# Patient Record
Sex: MALE | Race: BLACK OR AFRICAN AMERICAN | Employment: UNEMPLOYED | URBAN - METROPOLITAN AREA
[De-identification: names, ages, dates, MRNs, and addresses within clinical notes are randomized per-mention and may not be internally consistent; named-entity substitution may affect disease eponyms.]

---

## 2017-01-29 ENCOUNTER — APPOINTMENT (OUTPATIENT)
Dept: CT IMAGING | Age: 42
End: 2017-01-29
Attending: EMERGENCY MEDICINE
Payer: MEDICARE

## 2017-01-29 ENCOUNTER — HOSPITAL ENCOUNTER (EMERGENCY)
Age: 42
Discharge: HOME | End: 2017-01-29
Attending: EMERGENCY MEDICINE | Admitting: EMERGENCY MEDICINE
Payer: MEDICARE

## 2017-01-29 VITALS
SYSTOLIC BLOOD PRESSURE: 127 MMHG | TEMPERATURE: 97.8 F | DIASTOLIC BLOOD PRESSURE: 94 MMHG | HEART RATE: 103 BPM | OXYGEN SATURATION: 96 % | RESPIRATION RATE: 16 BRPM

## 2017-01-29 DIAGNOSIS — R10.9 ACUTE ABDOMINAL PAIN: Primary | ICD-10-CM

## 2017-01-29 LAB
ALBUMIN SERPL BCP-MCNC: 3.7 G/DL (ref 3.5–5)
ALBUMIN/GLOB SERPL: 0.8 {RATIO} (ref 1.1–2.2)
ALP SERPL-CCNC: 158 U/L (ref 45–117)
ALT SERPL-CCNC: 10 U/L (ref 12–78)
ANION GAP BLD CALC-SCNC: 13 MMOL/L (ref 5–15)
AST SERPL W P-5'-P-CCNC: 15 U/L (ref 15–37)
BASOPHILS # BLD AUTO: 0.1 K/UL (ref 0–0.1)
BASOPHILS # BLD: 1 % (ref 0–1)
BILIRUB SERPL-MCNC: 1 MG/DL (ref 0.2–1)
BUN SERPL-MCNC: 15 MG/DL (ref 6–20)
BUN/CREAT SERPL: 2 (ref 12–20)
CALCIUM SERPL-MCNC: 8.8 MG/DL (ref 8.5–10.1)
CHLORIDE SERPL-SCNC: 99 MMOL/L (ref 97–108)
CO2 SERPL-SCNC: 27 MMOL/L (ref 21–32)
CREAT SERPL-MCNC: 6.99 MG/DL (ref 0.7–1.3)
DIFFERENTIAL METHOD BLD: ABNORMAL
EOSINOPHIL # BLD: 1.2 K/UL (ref 0–0.4)
EOSINOPHIL NFR BLD: 14 % (ref 0–7)
ERYTHROCYTE [DISTWIDTH] IN BLOOD BY AUTOMATED COUNT: 18.8 % (ref 11.5–14.5)
GLOBULIN SER CALC-MCNC: 4.4 G/DL (ref 2–4)
GLUCOSE SERPL-MCNC: 87 MG/DL (ref 65–100)
HCT VFR BLD AUTO: 39.7 % (ref 36.6–50.3)
HGB BLD-MCNC: 13.4 G/DL (ref 12.1–17)
INR BLD: 1.2 (ref 0.9–1.2)
LIPASE SERPL-CCNC: 159 U/L (ref 73–393)
LYMPHOCYTES # BLD AUTO: 17 % (ref 12–49)
LYMPHOCYTES # BLD: 1.5 K/UL (ref 0.8–3.5)
MCH RBC QN AUTO: 32.1 PG (ref 26–34)
MCHC RBC AUTO-ENTMCNC: 33.8 G/DL (ref 30–36.5)
MCV RBC AUTO: 95.2 FL (ref 80–99)
MONOCYTES # BLD: 0.7 K/UL (ref 0–1)
MONOCYTES NFR BLD AUTO: 8 % (ref 5–13)
NEUTS SEG # BLD: 5.1 K/UL (ref 1.8–8)
NEUTS SEG NFR BLD AUTO: 60 % (ref 32–75)
PLATELET # BLD AUTO: 145 K/UL (ref 150–400)
PLATELET COMMENTS,PCOM: ABNORMAL
POTASSIUM SERPL-SCNC: 3.8 MMOL/L (ref 3.5–5.1)
PROT SERPL-MCNC: 8.1 G/DL (ref 6.4–8.2)
RBC # BLD AUTO: 4.17 M/UL (ref 4.1–5.7)
RBC MORPH BLD: ABNORMAL
SODIUM SERPL-SCNC: 139 MMOL/L (ref 136–145)
WBC # BLD AUTO: 8.6 K/UL (ref 4.1–11.1)

## 2017-01-29 PROCEDURE — 99284 EMERGENCY DEPT VISIT MOD MDM: CPT

## 2017-01-29 PROCEDURE — 74176 CT ABD & PELVIS W/O CONTRAST: CPT

## 2017-01-29 PROCEDURE — 74011250636 HC RX REV CODE- 250/636: Performed by: EMERGENCY MEDICINE

## 2017-01-29 PROCEDURE — 85025 COMPLETE CBC W/AUTO DIFF WBC: CPT | Performed by: EMERGENCY MEDICINE

## 2017-01-29 PROCEDURE — 96372 THER/PROPH/DIAG INJ SC/IM: CPT

## 2017-01-29 PROCEDURE — 85610 PROTHROMBIN TIME: CPT

## 2017-01-29 PROCEDURE — 74011250637 HC RX REV CODE- 250/637: Performed by: EMERGENCY MEDICINE

## 2017-01-29 PROCEDURE — 36415 COLL VENOUS BLD VENIPUNCTURE: CPT | Performed by: EMERGENCY MEDICINE

## 2017-01-29 PROCEDURE — 83690 ASSAY OF LIPASE: CPT | Performed by: EMERGENCY MEDICINE

## 2017-01-29 PROCEDURE — 80053 COMPREHEN METABOLIC PANEL: CPT | Performed by: EMERGENCY MEDICINE

## 2017-01-29 RX ORDER — ONDANSETRON 4 MG/1
8 TABLET, ORALLY DISINTEGRATING ORAL
Status: COMPLETED | OUTPATIENT
Start: 2017-01-29 | End: 2017-01-29

## 2017-01-29 RX ORDER — HYDROMORPHONE HYDROCHLORIDE 1 MG/ML
1 INJECTION, SOLUTION INTRAMUSCULAR; INTRAVENOUS; SUBCUTANEOUS
Status: COMPLETED | OUTPATIENT
Start: 2017-01-29 | End: 2017-01-29

## 2017-01-29 RX ORDER — WARFARIN 2.5 MG/1
2.5 TABLET ORAL
COMMUNITY
End: 2017-03-24

## 2017-01-29 RX ORDER — ONDANSETRON 4 MG/1
4 TABLET, ORALLY DISINTEGRATING ORAL
Qty: 10 TAB | Refills: 0 | Status: SHIPPED | OUTPATIENT
Start: 2017-01-29 | End: 2017-03-24

## 2017-01-29 RX ORDER — HYDROCODONE BITARTRATE AND ACETAMINOPHEN 5; 325 MG/1; MG/1
1 TABLET ORAL
Qty: 20 TAB | Refills: 0 | Status: SHIPPED | OUTPATIENT
Start: 2017-01-29 | End: 2017-03-24

## 2017-01-29 RX ADMIN — HYDROMORPHONE HYDROCHLORIDE 1 MG: 1 INJECTION, SOLUTION INTRAMUSCULAR; INTRAVENOUS; SUBCUTANEOUS at 03:33

## 2017-01-29 RX ADMIN — ONDANSETRON 8 MG: 4 TABLET, ORALLY DISINTEGRATING ORAL at 03:33

## 2017-01-29 NOTE — ED NOTES
Pt to ED for LLQ abd pain and N/V since 1300, denies diarrhea, fevers. Pt reports hx of GI surgery (including appendectomy and cholecystectomy), placed on cardiac monitoring x2, call bell within reach.

## 2017-01-29 NOTE — ED PROVIDER NOTES
HPI Comments: Cozette Bence is a 46 y.o. male, pmhx significant for peritonitis, abdominal hematoma, pancreatitis, SBO, and PUD, who presents ambulatory to the ED c/o sharp intermittent LLQ abdominal pain x 1 day. Pt notes that the pain is gradually worsening since 1pm yesterday and is currently a 7/10 in the ED, Pt additionally complains of nausea and vomiting. He endorses having a bowel movement today. Pt also has a surgical hx of cholecystectomy, appendectomy, bowel resection, and kidney transplants, but is not currently on any immunotherapy. Pt notes that he is an end stage renal patient on Tuesday, Thursday, and Saturday HD. Pt endorses receiving full dialysis tx yesterday to his typical dry weight off 66kg. Of note, pt is on Warfarin. Pt specifically denies diarrhea, constipation, blood in stool, hematemesis, chest pain, fever, chills, or SOB. PCP: Jessica Lima MD    PSHx: Significant for 2 kidney transplants, cholecystectomy, appendectomy, small bowel resection  Social Hx: -tobacco, -EtOH, -Illicit Drugs    There are no other complaints, changes, or physical findings at this time. The history is provided by the patient.         Past Medical History:   Diagnosis Date    Abdominal hematoma     AICD (automatic cardioverter/defibrillator) present     CAD (coronary artery disease)      anterior MI s/p 2 stents  2007    Chronic abdominal pain     Chronic kidney disease     DVT (deep venous thrombosis) ([address] 2001    DVT of popliteal vein ([address] 11/2011     not anticoagulated due to bleeding, IVC filter    Gallstone pancreatitis     Gastrointestinal disorder      acid reflux    Gastrointestinal disorder      peptic ulcer    High cholesterol     Hypertension     Kidney transplant      b/l kidneys 1995, 2001    Nephrotic syndrome     Other ill-defined conditions(799.89)      kidny transplant x2,  on dialysis    Other ill-defined conditions(799.89)      dialysis tu-th-sat    Other ill-defined conditions(799.89)      hx recurrent left leg DVT    Peritonitis (HCC)     Seizures (HCC)     Small bowel obstruction (HCC)     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    [facility])        Past Surgical History:   Procedure Laterality Date    Pr edg us exam surgical alter stom duodenum/jejunum  7/15/2011          Pr esophagogastroduodenoscopy transoral diagnostic  5/24/2012          Hx pacemaker  6/2009     AICD    Hx urological       2 kidney transplants    Hx cholecystectomy      Hx appendectomy      Hx small bowel resection      Hx other surgical       cholecystectomy    Hx other surgical  11/2011     exploratory laparotomy    Hx other surgical       fem-pop    Hx other surgical  02/2013     right upper extremity fistula    Hx transplant  2001      Kidney         Family History:   Problem Relation Age of Onset    COPD Mother     Lung Disease Mother     Cancer Father      stomach    Cancer Maternal Grandmother        Social History     Social History    Marital status: SINGLE     Spouse name: N/A    Number of children: N/A    Years of education: N/A     Occupational History    Not on file. Social History Main Topics    Smoking status: Never Smoker    Smokeless tobacco: Never Used    Alcohol use No    Drug use: No    Sexual activity: Not on file     Other Topics Concern    Not on file     Social History Narrative         ALLERGIES: Shellfish containing products; Heparin analogues; and Iodinated contrast media - oral and iv dye    Review of Systems   Constitutional: Negative. Negative for chills and fever. HENT: Negative. Eyes: Negative. Respiratory: Negative. Negative for shortness of breath. Cardiovascular: Negative. Negative for chest pain. Gastrointestinal: Positive for abdominal pain, nausea and vomiting. Negative for blood in stool, constipation and diarrhea. Genitourinary: Negative. Negative for hematuria. Skin: Negative. Neurological: Negative. Hematological: Negative. Psychiatric/Behavioral: Negative. All other systems reviewed and are negative. Patient Vitals for the past 12 hrs:   Temp Pulse Resp BP SpO2   01/29/17 0424 - - - (!) 127/94 96 %   01/29/17 0315 - - - (!) 123/97 96 %   01/29/17 0300 - - - (!) 120/99 96 %   01/29/17 0200 - - - (!) 132/95 96 %   01/29/17 0142 97.8 °F (36.6 °C) (!) 103 16 (!) 131/100 98 %       Physical Exam   Constitutional: He is oriented to person, place, and time. He appears well-developed and well-nourished. No distress. HENT:   Head: Normocephalic and atraumatic. Eyes: Conjunctivae and EOM are normal. Pupils are equal, round, and reactive to light. Right eye exhibits no discharge. Left eye exhibits no discharge. No scleral icterus. Neck: Normal range of motion. Neck supple. No JVD present. Cardiovascular: Normal rate, regular rhythm, normal heart sounds and intact distal pulses. Exam reveals no gallop and no friction rub. No murmur heard. Pulmonary/Chest: Effort normal and breath sounds normal. No stridor. No respiratory distress. He has no wheezes. He has no rales. He exhibits no tenderness. Abdominal: Soft. He exhibits no distension, no abdominal bruit, no pulsatile midline mass and no mass. Bowel sounds are decreased. There is no hepatosplenomegaly. There is generalized tenderness. There is no rebound, no guarding and no CVA tenderness. No hernia. Musculoskeletal: Normal range of motion. Neurological: He is alert and oriented to person, place, and time. Skin: Skin is warm and dry. No rash noted. He is not diaphoretic. No erythema. No pallor. Nursing note and vitals reviewed. MDM  Number of Diagnoses or Management Options  Acute abdominal pain:   Diagnosis management comments: DDx: small bowel obstruction, perforated viscous, diverticulitis, hernia, mesenteric ischemia     Complex vascular and gastrointestinal surgical history.  Presents with 24 hrs of abdominal pain, nausea, and vomiting. Will obtain labs, and CT and final disposition will be made pending those results. CT was unremarkable. Repeat exam shows no surgical signs. Will follow up with Dr Kun Ruvalcaba and/or Complexity of Data Reviewed  Clinical lab tests: reviewed and ordered  Tests in the radiology section of CPT®: ordered and reviewed  Review and summarize past medical records: yes  Independent visualization of images, tracings, or specimens: yes    Risk of Complications, Morbidity, and/or Mortality  Presenting problems: moderate  Diagnostic procedures: moderate  Management options: moderate    Patient Progress  Patient progress: stable    ED Course       Procedures     PROGRESS NOTE:  4:32 AM  Pt is feeling better. No surgical signs. Tolerating PO. Pt is ready for discharge  Written by Sandie Willingham ED Scribe, as dictated by Lily Luis MD.      LABORATORY TESTS:  Recent Results (from the past 12 hour(s))   CBC WITH AUTOMATED DIFF    Collection Time: 01/29/17  3:38 AM   Result Value Ref Range    WBC 8.6 4.1 - 11.1 K/uL    RBC 4.17 4.10 - 5.70 M/uL    HGB 13.4 12.1 - 17.0 g/dL    HCT 39.7 36.6 - 50.3 %    MCV 95.2 80.0 - 99.0 FL    MCH 32.1 26.0 - 34.0 PG    MCHC 33.8 30.0 - 36.5 g/dL    RDW 18.8 (H) 11.5 - 14.5 %    PLATELET 986 (L) 173 - 400 K/uL    NEUTROPHILS 60 32 - 75 %    LYMPHOCYTES 17 12 - 49 %    MONOCYTES 8 5 - 13 %    EOSINOPHILS 14 (H) 0 - 7 %    BASOPHILS 1 0 - 1 %    ABS. NEUTROPHILS 5.1 1.8 - 8.0 K/UL    ABS. LYMPHOCYTES 1.5 0.8 - 3.5 K/UL    ABS. MONOCYTES 0.7 0.0 - 1.0 K/UL    ABS. EOSINOPHILS 1.2 (H) 0.0 - 0.4 K/UL    ABS.  BASOPHILS 0.1 0.0 - 0.1 K/UL    DF SMEAR SCANNED      PLATELET COMMENTS LARGE PLATELETS      RBC COMMENTS ANISOCYTOSIS  1+       METABOLIC PANEL, COMPREHENSIVE    Collection Time: 01/29/17  3:38 AM   Result Value Ref Range    Sodium 139 136 - 145 mmol/L    Potassium 3.8 3.5 - 5.1 mmol/L    Chloride 99 97 - 108 mmol/L    CO2 27 21 - 32 mmol/L    Anion gap 13 5 - 15 mmol/L    Glucose 87 65 - 100 mg/dL    BUN 15 6 - 20 MG/DL    Creatinine 6.99 (H) 0.70 - 1.30 MG/DL    BUN/Creatinine ratio 2 (L) 12 - 20      GFR est AA 11 (L) >60 ml/min/1.73m2    GFR est non-AA 9 (L) >60 ml/min/1.73m2    Calcium 8.8 8.5 - 10.1 MG/DL    Bilirubin, total 1.0 0.2 - 1.0 MG/DL    ALT 10 (L) 12 - 78 U/L    AST 15 15 - 37 U/L    Alk. phosphatase 158 (H) 45 - 117 U/L    Protein, total 8.1 6.4 - 8.2 g/dL    Albumin 3.7 3.5 - 5.0 g/dL    Globulin 4.4 (H) 2.0 - 4.0 g/dL    A-G Ratio 0.8 (L) 1.1 - 2.2     LIPASE    Collection Time: 01/29/17  3:38 AM   Result Value Ref Range    Lipase 159 73 - 393 U/L   POC INR    Collection Time: 01/29/17  4:40 AM   Result Value Ref Range    INR (POC) 1.2 (H) <1.2         IMAGING RESULTS:  CT ABD PELV WO CONT   Final Result   INDICATION: Left lower quadrant pain since yesterday with vomiting     COMPARISON: 9/9/2015     TECHNIQUE:   Thin axial images were obtained through the abdomen and pelvis. Coronal and  sagittal reconstructions were generated. Oral contrast was not administered. CT  dose reduction was achieved through use of a standardized protocol tailored for  this examination and automatic exposure control for dose modulation.      The absence of intravenous contrast material reduces the sensitivity for  evaluation of the solid parenchymal organs of the abdomen.      FINDINGS:   LUNG BASES: Scarring is stable at the left base. INCIDENTALLY IMAGED HEART AND MEDIASTINUM: There is a trace pericardial effusion  LIVER: There is a 3.7 x 1.3 cm partially calcified serosal cystic abnormality in  the posterior segment of the right hepatic lobe, increased compared to the prior  examination. GALLBLADDER: Surgically absent  SPLEEN: No mass. PANCREAS: No mass or ductal dilatation. ADRENALS: Unremarkable. KIDNEYS/URETERS: The kidneys are atrophic bilaterally.  There is a 2.5 cm left  renal cyst. Calcified bilateral lower lobe renal transplants are again noted. STOMACH: Unremarkable. SMALL BOWEL: No dilatation or wall thickening. COLON: No dilatation or wall thickening. APPENDIX: Surgically absent. PERITONEUM: Calcified mesenteric mass is unchanged compared to the prior  examination. There is a bilobed 6.7 x 3.7 cm cystic abnormality in the  gastrohepatic region, new compared to the prior exam.   RETROPERITONEUM: No lymphadenopathy or aortic aneurysm. REPRODUCTIVE ORGANS: No pelvic mass or lymphadenopathy  URINARY BLADDER: No mass or calculus. BONES: Diffuse osseous sclerosis is again demonstrated. ADDITIONAL COMMENTS: N/A     IMPRESSION  IMPRESSION:  No acute abnormality. New bilobed cystic abnormality in the gastrohepatic region  may represent the sequelae of peritonitis as may a small serosal cystic  abnormality along the right hepatic lobe. Mesenteric mass is stable. MEDICATIONS GIVEN:  Medications   HYDROmorphone (PF) (DILAUDID) injection 1 mg (1 mg IntraMUSCular Given 1/29/17 0333)   ondansetron (ZOFRAN ODT) tablet 8 mg (8 mg Oral Given 1/29/17 0333)       IMPRESSION:  1. Acute abdominal pain        PLAN:  1. Discharge home. Current Discharge Medication List      START taking these medications    Details   HYDROcodone-acetaminophen (NORCO) 5-325 mg per tablet Take 1 Tab by mouth every four (4) hours as needed for Pain. Max Daily Amount: 6 Tabs. Qty: 20 Tab, Refills: 0      ondansetron (ZOFRAN ODT) 4 mg disintegrating tablet Take 1 Tab by mouth every eight (8) hours as needed for Nausea. Qty: 10 Tab, Refills: 0         CONTINUE these medications which have NOT CHANGED    Details   !! warfarin (COUMADIN) 2.5 mg tablet Take 2.5 mg by mouth every Sunday. promethazine (PHENERGAN) 25 mg tablet Take 1 Tab by mouth every six (6) hours as needed for Nausea. Qty: 15 Tab, Refills: 0      levETIRAcetam (KEPPRA) 750 mg tablet Take 750 mg by mouth two (2) times a day.       lacosamide (VIMPAT) 50 mg tab tablet Take 50 mg by mouth two (2) times a day.      atorvastatin (LIPITOR) 20 mg tablet Take 20 mg by mouth daily. !! warfarin (COUMADIN) 5 mg tablet Decrease coumadin dose to 2.5mg while taking these antibiotics. Recheck INR every 2 days until you finish the antibiotics. Qty: 30 Tab, Refills: 0      pantoprazole (PROTONIX) 40 mg tablet Take 40 mg by mouth daily. clopidogrel (PLAVIX) 75 mg tablet Take  by mouth daily. sevelamer carbonate (RENVELA) 800 mg Tab tab Take 2,400 mg by mouth three (3) times daily (with meals). aspirin 81 mg chewable tablet Take 81 mg by mouth daily. metoprolol (LOPRESSOR) 25 mg tablet Take 12.5 mg by mouth daily. !! - Potential duplicate medications found. Please discuss with provider. 2.   Follow-up Information     Follow up With Details Comments Contact Info    Damir Varma MD Call in 1 day      Yesenia Neff MD In 1 week  [facility] [address] [place] [address]  [phone]      [facility] EMERGENCY DEPT  If symptoms worsen [address] [place] [address] Drive  [address] [facility]  [phone]      3. Return to ED if worse       DISCHARGE NOTE:  4:55 AM  The patient's results have been reviewed with family and/or caregiver. They verbally convey their understanding and agreement of the patient's signs, symptoms, diagnosis, treatment, and prognosis. They additionally agree to follow up as recommended in the discharge instructions or to return to the Emergency Room should the patient's condition change prior to their follow-up appointment. The family and/or caregiver verbally agrees with the care-plan and all of their questions have been answered. The discharge instructions have also been provided to the them along with educational information regarding the patient's diagnosis and a list of reasons why the patient would want to return to the ER prior to their follow-up appointment should their condition change.     Written by KAYLEY Ramirez, as dictated by Jia Ley MD. Edvin Rajput: This note is prepared by Shant Gross, acting as Scribe for Bakari Campos MD.    Bakari Campos MD: The scribe's documentation has been prepared under my direction and personally reviewed by me in its entirety. I confirm that the note above accurately reflects all work, treatment, procedures, and medical decision making performed by me.

## 2017-01-29 NOTE — DISCHARGE INSTRUCTIONS
Abdominal Pain: Care Instructions  Your Care Instructions    Abdominal pain has many possible causes. Some aren't serious and get better on their own in a few days. Others need more testing and treatment. If your pain continues or gets worse, you need to be rechecked and may need more tests to find out what is wrong. You may need surgery to correct the problem. Don't ignore new symptoms, such as fever, nausea and vomiting, urination problems, pain that gets worse, and dizziness. These may be signs of a more serious problem. Your doctor may have recommended a follow-up visit in the next 8 to 12 hours. If you are not getting better, you may need more tests or treatment. The doctor has checked you carefully, but problems can develop later. If you notice any problems or new symptoms, get medical treatment right away. Follow-up care is a key part of your treatment and safety. Be sure to make and go to all appointments, and call your doctor if you are having problems. It's also a good idea to know your test results and keep a list of the medicines you take. How can you care for yourself at home? · Rest until you feel better. · To prevent dehydration, drink plenty of fluids, enough so that your urine is light yellow or clear like water. Choose water and other caffeine-free clear liquids until you feel better. If you have kidney, heart, or liver disease and have to limit fluids, talk with your doctor before you increase the amount of fluids you drink. · If your stomach is upset, eat mild foods, such as rice, dry toast or crackers, bananas, and applesauce. Try eating several small meals instead of two or three large ones. · Wait until 48 hours after all symptoms have gone away before you have spicy foods, alcohol, and drinks that contain caffeine. · Do not eat foods that are high in fat. · Avoid anti-inflammatory medicines such as aspirin, ibuprofen (Advil, Motrin), and naproxen (Aleve).  These can cause stomach upset. Talk to your doctor if you take daily aspirin for another health problem. When should you call for help? Call 911 anytime you think you may need emergency care. For example, call if:  · You passed out (lost consciousness). · You pass maroon or very bloody stools. · You vomit blood or what looks like coffee grounds. · You have new, severe belly pain. Call your doctor now or seek immediate medical care if:  · Your pain gets worse, especially if it becomes focused in one area of your belly. · You have a new or higher fever. · Your stools are black and look like tar, or they have streaks of blood. · You have unexpected vaginal bleeding. · You have symptoms of a urinary tract infection. These may include:  ¨ Pain when you urinate. ¨ Urinating more often than usual.  ¨ Blood in your urine. · You are dizzy or lightheaded, or you feel like you may faint. Watch closely for changes in your health, and be sure to contact your doctor if:  · You are not getting better after 1 day (24 hours). Where can you learn more? Go to http://anabel-mor.info/. Enter M169 in the search box to learn more about \"Abdominal Pain: Care Instructions. \"  Current as of: May 27, 2016  Content Version: 11.1  © 5554-3619 [employer]. Care instructions adapted under license by [employer] (which disclaims liability or warranty for this information). If you have questions about a medical condition or this instruction, always ask your healthcare professional. [place] [address] any warranty or liability for your use of this information. Chartboost Activation    Thank you for requesting access to Chartboost. Please follow the instructions below to securely access and download your online medical record. Chartboost allows you to send messages to your doctor, view your test results, renew your prescriptions, schedule appointments, and more. How Do I Sign Up? 1.  In your internet browser, go to [email or web address]  2. Click on the First Time User? Click Here link in the Sign In box. You will be redirect to the New Member Sign Up page. 3. Enter your Path101 Access Code exactly as it appears below. You will not need to use this code after youve completed the sign-up process. If you do not sign up before the expiration date, you must request a new code. MyChart Access Code: Activation code not generated  Current Path101 Status: Active (This is the date your A-TEXt access code will )    4. Enter the last four digits of your Social Security Number (xxxx) and Date of Birth (mm/dd/yyyy) as indicated and click Submit. You will be taken to the next sign-up page. 5. Create a A-TEXt ID. This will be your Path101 login ID and cannot be changed, so think of one that is secure and easy to remember. 6. Create a Path101 password. You can change your password at any time. 7. Enter your Password Reset Question and Answer. This can be used at a later time if you forget your password. 8. Enter your e-mail address. You will receive e-mail notification when new information is available in [address] 9. Click Sign Up. You can now view and download portions of your medical record. 10. Click the Download Summary menu link to download a portable copy of your medical information. Additional Information    If you have questions, please visit the Frequently Asked Questions section of the Path101 website at [email or web address] Remember, Path101 is NOT to be used for urgent needs. For medical emergencies, dial 911.

## 2017-02-25 ENCOUNTER — APPOINTMENT (OUTPATIENT)
Dept: CT IMAGING | Age: 42
End: 2017-02-25
Attending: EMERGENCY MEDICINE
Payer: MEDICARE

## 2017-02-25 ENCOUNTER — HOSPITAL ENCOUNTER (EMERGENCY)
Age: 42
Discharge: HOME | End: 2017-02-25
Attending: EMERGENCY MEDICINE | Admitting: EMERGENCY MEDICINE
Payer: MEDICARE

## 2017-02-25 VITALS
HEIGHT: 67 IN | BODY MASS INDEX: 22.49 KG/M2 | TEMPERATURE: 98.2 F | RESPIRATION RATE: 16 BRPM | WEIGHT: 143.3 LBS | DIASTOLIC BLOOD PRESSURE: 100 MMHG | HEART RATE: 88 BPM | SYSTOLIC BLOOD PRESSURE: 129 MMHG | OXYGEN SATURATION: 100 %

## 2017-02-25 DIAGNOSIS — R11.2 NON-INTRACTABLE VOMITING WITH NAUSEA, UNSPECIFIED VOMITING TYPE: Primary | ICD-10-CM

## 2017-02-25 DIAGNOSIS — K59.00 CONSTIPATION, UNSPECIFIED CONSTIPATION TYPE: ICD-10-CM

## 2017-02-25 LAB
ALBUMIN SERPL BCP-MCNC: 3.5 G/DL (ref 3.5–5)
ALBUMIN/GLOB SERPL: 0.8 {RATIO} (ref 1.1–2.2)
ALP SERPL-CCNC: 133 U/L (ref 45–117)
ALT SERPL-CCNC: 10 U/L (ref 12–78)
ANION GAP BLD CALC-SCNC: 14 MMOL/L (ref 5–15)
AST SERPL W P-5'-P-CCNC: 18 U/L (ref 15–37)
BASOPHILS # BLD AUTO: 0.1 K/UL (ref 0–0.1)
BASOPHILS # BLD: 2 % (ref 0–1)
BILIRUB SERPL-MCNC: 0.7 MG/DL (ref 0.2–1)
BUN SERPL-MCNC: 20 MG/DL (ref 6–20)
BUN/CREAT SERPL: 3 (ref 12–20)
CALCIUM SERPL-MCNC: 8.6 MG/DL (ref 8.5–10.1)
CHLORIDE SERPL-SCNC: 100 MMOL/L (ref 97–108)
CO2 SERPL-SCNC: 24 MMOL/L (ref 21–32)
CREAT SERPL-MCNC: 7.34 MG/DL (ref 0.7–1.3)
EOSINOPHIL # BLD: 1 K/UL (ref 0–0.4)
EOSINOPHIL NFR BLD: 14 % (ref 0–7)
ERYTHROCYTE [DISTWIDTH] IN BLOOD BY AUTOMATED COUNT: 16.3 % (ref 11.5–14.5)
GLOBULIN SER CALC-MCNC: 4.2 G/DL (ref 2–4)
GLUCOSE SERPL-MCNC: 74 MG/DL (ref 65–100)
HCT VFR BLD AUTO: 35.8 % (ref 36.6–50.3)
HGB BLD-MCNC: 12.2 G/DL (ref 12.1–17)
LACTATE SERPL-SCNC: 1 MMOL/L (ref 0.4–2)
LIPASE SERPL-CCNC: 102 U/L (ref 73–393)
LYMPHOCYTES # BLD AUTO: 15 % (ref 12–49)
LYMPHOCYTES # BLD: 1.1 K/UL (ref 0.8–3.5)
MAGNESIUM SERPL-MCNC: 2.1 MG/DL (ref 1.6–2.4)
MCH RBC QN AUTO: 31.5 PG (ref 26–34)
MCHC RBC AUTO-ENTMCNC: 34.1 G/DL (ref 30–36.5)
MCV RBC AUTO: 92.5 FL (ref 80–99)
MONOCYTES # BLD: 0.5 K/UL (ref 0–1)
MONOCYTES NFR BLD AUTO: 7 % (ref 5–13)
NEUTS SEG # BLD: 4.4 K/UL (ref 1.8–8)
NEUTS SEG NFR BLD AUTO: 62 % (ref 32–75)
PHOSPHATE SERPL-MCNC: 3.3 MG/DL (ref 2.6–4.7)
PLATELET # BLD AUTO: 106 K/UL (ref 150–400)
POTASSIUM SERPL-SCNC: 3.1 MMOL/L (ref 3.5–5.1)
PROT SERPL-MCNC: 7.7 G/DL (ref 6.4–8.2)
RBC # BLD AUTO: 3.87 M/UL (ref 4.1–5.7)
RBC MORPH BLD: ABNORMAL
SODIUM SERPL-SCNC: 138 MMOL/L (ref 136–145)
WBC # BLD AUTO: 7.1 K/UL (ref 4.1–11.1)

## 2017-02-25 PROCEDURE — 83690 ASSAY OF LIPASE: CPT | Performed by: EMERGENCY MEDICINE

## 2017-02-25 PROCEDURE — 96374 THER/PROPH/DIAG INJ IV PUSH: CPT

## 2017-02-25 PROCEDURE — 74011636320 HC RX REV CODE- 636/320: Performed by: EMERGENCY MEDICINE

## 2017-02-25 PROCEDURE — 84100 ASSAY OF PHOSPHORUS: CPT | Performed by: EMERGENCY MEDICINE

## 2017-02-25 PROCEDURE — 99283 EMERGENCY DEPT VISIT LOW MDM: CPT

## 2017-02-25 PROCEDURE — 74176 CT ABD & PELVIS W/O CONTRAST: CPT

## 2017-02-25 PROCEDURE — 80053 COMPREHEN METABOLIC PANEL: CPT | Performed by: EMERGENCY MEDICINE

## 2017-02-25 PROCEDURE — 83735 ASSAY OF MAGNESIUM: CPT | Performed by: EMERGENCY MEDICINE

## 2017-02-25 PROCEDURE — 83605 ASSAY OF LACTIC ACID: CPT | Performed by: EMERGENCY MEDICINE

## 2017-02-25 PROCEDURE — 74011250636 HC RX REV CODE- 250/636: Performed by: EMERGENCY MEDICINE

## 2017-02-25 PROCEDURE — 36415 COLL VENOUS BLD VENIPUNCTURE: CPT | Performed by: EMERGENCY MEDICINE

## 2017-02-25 PROCEDURE — 85025 COMPLETE CBC W/AUTO DIFF WBC: CPT | Performed by: EMERGENCY MEDICINE

## 2017-02-25 RX ORDER — AMOXICILLIN 250 MG
2 CAPSULE ORAL DAILY
Qty: 40 TAB | Refills: 0 | Status: SHIPPED | OUTPATIENT
Start: 2017-02-25 | End: 2017-03-17

## 2017-02-25 RX ORDER — MORPHINE SULFATE 2 MG/ML
4 INJECTION, SOLUTION INTRAMUSCULAR; INTRAVENOUS
Status: DISCONTINUED | OUTPATIENT
Start: 2017-02-25 | End: 2017-02-25 | Stop reason: HOSPADM

## 2017-02-25 RX ORDER — ONDANSETRON 2 MG/ML
4 INJECTION INTRAMUSCULAR; INTRAVENOUS
Status: COMPLETED | OUTPATIENT
Start: 2017-02-25 | End: 2017-02-25

## 2017-02-25 RX ORDER — ONDANSETRON 4 MG/1
4 TABLET, ORALLY DISINTEGRATING ORAL
Qty: 10 TAB | Refills: 0 | Status: SHIPPED | OUTPATIENT
Start: 2017-02-25 | End: 2017-03-24

## 2017-02-25 RX ADMIN — DIATRIZOATE MEGLUMINE AND DIATRIZOATE SODIUM 30 ML: 600; 100 SOLUTION ORAL; RECTAL at 10:21

## 2017-02-25 RX ADMIN — ONDANSETRON HYDROCHLORIDE 4 MG: 2 INJECTION, SOLUTION INTRAMUSCULAR; INTRAVENOUS at 10:21

## 2017-02-25 NOTE — DISCHARGE INSTRUCTIONS
You were seen in the emergency department for nausea and vomiting. Your exam thankfully shows no blockage or infection but you do have some constipation. Please use the prescribed medications to soften stool and help with nausea. Return for fever, new vomiting or other new concerning symptoms. Nausea and Vomiting: Care Instructions  Your Care Instructions    When you are nauseated, you may feel weak and sweaty and notice a lot of saliva in your mouth. Nausea often leads to vomiting. Most of the time you do not need to worry about nausea and vomiting, but they can be signs of other illnesses. Two common causes of nausea and vomiting are stomach flu and food poisoning. Nausea and vomiting from viral stomach flu will usually start to improve within 24 hours. Nausea and vomiting from food poisoning may last from 12 to 48 hours. The doctor has checked you carefully, but problems can develop later. If you notice any problems or new symptoms, get medical treatment right away. Follow-up care is a key part of your treatment and safety. Be sure to make and go to all appointments, and call your doctor if you are having problems. It's also a good idea to know your test results and keep a list of the medicines you take. How can you care for yourself at home? · To prevent dehydration, drink plenty of fluids, enough so that your urine is light yellow or clear like water. Choose water and other caffeine-free clear liquids until you feel better. If you have kidney, heart, or liver disease and have to limit fluids, talk with your doctor before you increase the amount of fluids you drink. · Rest in bed until you feel better. · When you are able to eat, try clear soups, mild foods, and liquids until all symptoms are gone for 12 to 48 hours. Other good choices include dry toast, crackers, cooked cereal, and gelatin dessert, such as Jell-O. When should you call for help?   Call 911 anytime you think you may need emergency care. For example, call if:  · You passed out (lost consciousness). Call your doctor now or seek immediate medical care if:  · You have symptoms of dehydration, such as:  ¨ Dry eyes and a dry mouth. ¨ Passing only a little dark urine. ¨ Feeling thirstier than usual.  · You have new or worsening belly pain. · You have a new or higher fever. · You vomit blood or what looks like coffee grounds. Watch closely for changes in your health, and be sure to contact your doctor if:  · You have ongoing nausea and vomiting. · Your vomiting is getting worse. · Your vomiting lasts longer than 2 days. · You are not getting better as expected. Where can you learn more? Go to http://anabel-mor.info/. Enter 25 [id number] in the search box to learn more about \"Nausea and Vomiting: Care Instructions. \"  Current as of: May 27, 2016  Content Version: 11.1  © 1436-6114 [employer]. Care instructions adapted under license by [employer] (which disclaims liability or warranty for this information). If you have questions about a medical condition or this instruction, always ask your healthcare professional. [place] [address] any warranty or liability for your use of this information. Constipation: Care Instructions  Your Care Instructions  Constipation means that you have a hard time passing stools (bowel movements). People pass stools from 3 times a day to once every 3 days. What is normal for you may be different. Constipation may occur with pain in the rectum and cramping. The pain may get worse when you try to pass stools. Sometimes there are small amounts of bright red blood on toilet paper or the surface of stools. This is because of enlarged veins near the rectum (hemorrhoids). A few changes in your diet and lifestyle may help you avoid ongoing constipation. Your doctor may also prescribe medicine to help loosen your stool. Some medicines can cause constipation. These include pain medicines and antidepressants. Tell your doctor about all the medicines you take. Your doctor may want to make a medicine change to ease your symptoms. Follow-up care is a key part of your treatment and safety. Be sure to make and go to all appointments, and call your doctor if you are having problems. It's also a good idea to know your test results and keep a list of the medicines you take. How can you care for yourself at home? · Drink plenty of fluids, enough so that your urine is light yellow or clear like water. If you have kidney, heart, or liver disease and have to limit fluids, talk with your doctor before you increase the amount of fluids you drink. · Include high-fiber foods in your diet each day. These include fruits, vegetables, beans, and whole grains. · Get at least 30 minutes of exercise on most days of the week. Walking is a good choice. You also may want to do other activities, such as running, swimming, cycling, or playing tennis or team sports. · Take a fiber supplement, such as Citrucel or Metamucil, every day. Read and follow all instructions on the label. · Schedule time each day for a bowel movement. A daily routine may help. Take your time having your bowel movement. · Support your feet with a small step stool when you sit on the toilet. This helps flex your hips and places your pelvis in a squatting position. · Your doctor may recommend an over-the-counter laxative to relieve your constipation. Examples are Milk of Magnesia and MiraLax. Read and follow all instructions on the label. Do not use laxatives on a long-term basis. When should you call for help? Call your doctor now or seek immediate medical care if:  · You have new or worse belly pain. · You have new or worse nausea or vomiting. · You have blood in your stools. Watch closely for changes in your health, and be sure to contact your doctor if:  · Your constipation is getting worse.   · You do not get better as expected. Where can you learn more? Go to http://anabel-mor.info/. Enter 21  in the search box to learn more about \"Constipation: Care Instructions. \"  Current as of: May 27, 2016  Content Version: 11.1  © 6600-1575 [employer]. Care instructions adapted under license by [employer] (which disclaims liability or warranty for this information). If you have questions about a medical condition or this instruction, always ask your healthcare professional. [place] [address] any warranty or liability for your use of this information.

## 2017-03-24 ENCOUNTER — HOSPITAL ENCOUNTER (EMERGENCY)
Age: 42
Discharge: HOME | End: 2017-03-24
Attending: EMERGENCY MEDICINE | Admitting: EMERGENCY MEDICINE
Payer: MEDICARE

## 2017-03-24 VITALS
WEIGHT: 142.2 LBS | BODY MASS INDEX: 22.32 KG/M2 | OXYGEN SATURATION: 100 % | HEART RATE: 78 BPM | HEIGHT: 67 IN | SYSTOLIC BLOOD PRESSURE: 111 MMHG | RESPIRATION RATE: 18 BRPM | TEMPERATURE: 97.2 F | DIASTOLIC BLOOD PRESSURE: 79 MMHG

## 2017-03-24 DIAGNOSIS — R11.0 NAUSEA WITHOUT VOMITING: Primary | ICD-10-CM

## 2017-03-24 PROCEDURE — 74011250637 HC RX REV CODE- 250/637: Performed by: EMERGENCY MEDICINE

## 2017-03-24 PROCEDURE — 99283 EMERGENCY DEPT VISIT LOW MDM: CPT

## 2017-03-24 RX ORDER — ONDANSETRON 4 MG/1
8 TABLET, ORALLY DISINTEGRATING ORAL
Status: COMPLETED | OUTPATIENT
Start: 2017-03-24 | End: 2017-03-24

## 2017-03-24 RX ORDER — OXYCODONE AND ACETAMINOPHEN 5; 325 MG/1; MG/1
2 TABLET ORAL ONCE
Status: COMPLETED | OUTPATIENT
Start: 2017-03-24 | End: 2017-03-24

## 2017-03-24 RX ADMIN — ONDANSETRON 8 MG: 4 TABLET, ORALLY DISINTEGRATING ORAL at 05:02

## 2017-03-24 RX ADMIN — OXYCODONE HYDROCHLORIDE AND ACETAMINOPHEN 2 TABLET: 5; 325 TABLET ORAL at 05:35

## 2017-03-24 NOTE — DISCHARGE INSTRUCTIONS
Nausea and Vomiting: Care Instructions  Your Care Instructions    When you are nauseated, you may feel weak and sweaty and notice a lot of saliva in your mouth. Nausea often leads to vomiting. Most of the time you do not need to worry about nausea and vomiting, but they can be signs of other illnesses. Two common causes of nausea and vomiting are stomach flu and food poisoning. Nausea and vomiting from viral stomach flu will usually start to improve within 24 hours. Nausea and vomiting from food poisoning may last from 12 to 48 hours. The doctor has checked you carefully, but problems can develop later. If you notice any problems or new symptoms, get medical treatment right away. Follow-up care is a key part of your treatment and safety. Be sure to make and go to all appointments, and call your doctor if you are having problems. It's also a good idea to know your test results and keep a list of the medicines you take. How can you care for yourself at home? · To prevent dehydration, drink plenty of fluids, enough so that your urine is light yellow or clear like water. Choose water and other caffeine-free clear liquids until you feel better. If you have kidney, heart, or liver disease and have to limit fluids, talk with your doctor before you increase the amount of fluids you drink. · Rest in bed until you feel better. · When you are able to eat, try clear soups, mild foods, and liquids until all symptoms are gone for 12 to 48 hours. Other good choices include dry toast, crackers, cooked cereal, and gelatin dessert, such as Jell-O. When should you call for help? Call 911 anytime you think you may need emergency care. For example, call if:  · You passed out (lost consciousness). Call your doctor now or seek immediate medical care if:  · You have symptoms of dehydration, such as:  ¨ Dry eyes and a dry mouth. ¨ Passing only a little dark urine.   ¨ Feeling thirstier than usual.  · You have new or worsening belly pain. · You have a new or higher fever. · You vomit blood or what looks like coffee grounds. Watch closely for changes in your health, and be sure to contact your doctor if:  · You have ongoing nausea and vomiting. · Your vomiting is getting worse. · Your vomiting lasts longer than 2 days. · You are not getting better as expected. Where can you learn more? Go to http://anabel-mor.info/. Enter 25 [id number] in the search box to learn more about \"Nausea and Vomiting: Care Instructions. \"  Current as of: May 27, 2016  Content Version: 11.1  © 8928-6249 [employer]. Care instructions adapted under license by [employer] (which disclaims liability or warranty for this information). If you have questions about a medical condition or this instruction, always ask your healthcare professional. [address] any warranty or liability for your use of this information. Oral Rehydration: Care Instructions  Your Care Instructions  Dehydration occurs when your body loses too much water. This can happen if you do not drink enough fluids or lose a lot of fluid due to diarrhea, vomiting, or sweating. Being dehydrated can cause health problems and can even be life-threatening. To replace lost fluids, you need to drink liquid that contains special chemicals called electrolytes. Electrolytes keep your body working well. Plain water does not have electrolytes. You also need to rest to prevent more fluid loss. Replacing water and electrolytes (oral rehydration) completely takes about 36 hours. But you should feel better within a few hours. Follow-up care is a key part of your treatment and safety. Be sure to make and go to all appointments, and call your doctor if you are having problems. It's also a good idea to know your test results and keep a list of the medicines you take. How can you care for yourself at home?   · Take frequent sips of a drink such as Gatorade, Powerade, or other rehydration drinks that your doctor suggests. These replace both fluid and important chemicals (electrolytes) you need for balance in your blood. · Drink 2 quarts of cool liquid over 2 to 4 hours. You should have at least 10 glasses of liquid a day to replace lost fluid. If you have kidney, heart, or liver disease and have to limit fluids, talk with your doctor before you increase the amount of fluids you drink. · Make your own drink. Measure everything carefully. The drink may not work well or may even be harmful if the amounts are off. ¨ 1 quart water  ¨ ½ teaspoon salt  ¨ 6 teaspoons sugar  · Do not drink liquid with caffeine, such as coffee and jolie. · Do not drink any alcohol. It can make you dehydrated. · Drink plenty of fluids, enough so that your urine is light yellow or clear like water. If you have kidney, heart, or liver disease and have to limit fluids, talk with your doctor before you increase the amount of fluids you drink. When should you call for help? Call 911 anytime you think you may need emergency care. For example, call if:  · You have signs of severe dehydration, such as:  ¨ You are confused or unable to stay awake. ¨ You passed out (lost consciousness). Call your doctor now or seek immediate medical care if:  · You still have signs of dehydration. You have sunken eyes and a dry mouth, and you pass only a little dark urine. · You are dizzy or lightheaded, or you feel like you may faint. · You are not able to keep down fluids. Watch closely for changes in your health, and be sure to contact your doctor if:  · You do not get better as expected. Where can you learn more? Go to http://anabel-mor.info/. Enter I040 in the search box to learn more about \"Oral Rehydration: Care Instructions. \"  Current as of: May 27, 2016  Content Version: 11.1  © 5761-6002 [employer].  Care instructions adapted under license by [employer] (which disclaims liability or warranty for this information). If you have questions about a medical condition or this instruction, always ask your healthcare professional. [address] any warranty or liability for your use of this information.

## 2017-03-24 NOTE — ED PROVIDER NOTES
HPI Comments: Ladarius Antonio, 46 y.o. Male with PMHx of HTN, high cholesterol, kidney transplant, CAD, CKD, AICD placement, DVT, GERD and seizures presents ambulatory to ED [facility] ED with cc of constant nausea, vomiting, and diarrhea x 10 PM yesterday. He has not vomited this morning. Pt also reports diffuse cramping abdominal pain that is worst in the LLQ. He has been unable to tolerate PO. Pt is unable to produce his own urine due to CKD. Pt's last visit was for the same symptoms was in  2/17 with normal labs and CT showing constipation. He denies any fevers, chills, CP , SOB, BIS, or hematemesis. PCP: Damir Varma MD    Social history significant for: - Tobacco, - EtOH, - Illicit Drug Use  PSHx: kidney transplant x 2, AICD, cholecystectomy, appendectomy, small bowel resection, fem-pop, RUE fistula     There are no other complaints, changes, or physical findings at this time. Written by Beau Reynoso, ED Scribe, as dictated by Pramod Diana MD.       The history is provided by the patient.         Past Medical History:   Diagnosis Date    Abdominal hematoma     AICD (automatic cardioverter/defibrillator) present     CAD (coronary artery disease)     anterior MI s/p 2 stents  2007    Chronic abdominal pain     Chronic kidney disease     DVT (deep venous thrombosis) ([address] 2001    DVT of popliteal vein ([address] 11/2011    not anticoagulated due to bleeding, IVC filter    Gallstone pancreatitis     Gastrointestinal disorder     acid reflux    Gastrointestinal disorder     peptic ulcer    High cholesterol     Hypertension     Kidney transplant     b/l kidneys 1995, 2001    Nephrotic syndrome     Other ill-defined conditions(799.89)     kidny transplant x2,  on dialysis    Other ill-defined conditions(799.89)     dialysis tu-th-sat    Other ill-defined conditions(799.89)     hx recurrent left leg DVT    Peritonitis ([address]     Seizures ([address]     Small bowel obstruction ([address]     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    [facility])        Past Surgical History:   Procedure Laterality Date    HX APPENDECTOMY      HX CHOLECYSTECTOMY      HX OTHER SURGICAL      cholecystectomy    HX OTHER SURGICAL  11/2011    exploratory laparotomy    HX OTHER SURGICAL      fem-pop    HX OTHER SURGICAL  02/2013    right upper extremity fistula    HX PACEMAKER  6/2009    AICD    HX SMALL BOWEL RESECTION      HX TRANSPLANT  2001     Kidney    HX UROLOGICAL      2 kidney transplants    SC EDG US EXAM SURGICAL ALTER STOM DUODENUM/JEJUNUM  7/15/2011         SC ESOPHAGOGASTRODUODENOSCOPY TRANSORAL DIAGNOSTIC  5/24/2012              Family History:   Problem Relation Age of Onset    COPD Mother     Lung Disease Mother     Cancer Father      stomach    Cancer Maternal Grandmother        Social History     Social History    Marital status: SINGLE     Spouse name: N/A    Number of children: N/A    Years of education: N/A     Occupational History    Not on file. Social History Main Topics    Smoking status: Never Smoker    Smokeless tobacco: Never Used    Alcohol use No    Drug use: No    Sexual activity: Not on file     Other Topics Concern    Not on file     Social History Narrative         ALLERGIES: Shellfish containing products; Heparin analogues; and Iodinated contrast media - oral and iv dye    Review of Systems   Constitutional: Negative for activity change, appetite change and unexpected weight change. HENT: Negative for congestion. Eyes: Negative for pain and visual disturbance. Respiratory: Negative for shortness of breath. Cardiovascular: Negative for chest pain. Gastrointestinal: Positive for abdominal pain, diarrhea, nausea and vomiting. Negative for blood in stool. (-) hematemesis   Genitourinary: Negative for dysuria. Musculoskeletal: Negative for back pain. Skin: Negative for rash.        Patient Vitals for the past 12 hrs:   Temp Pulse Resp BP SpO2   03/24/17 0537 - 78 18 111/79 100 %   03/24/17 0514 - - - - 100 %   03/24/17 0435 97.2 °F (36.2 °C) 84 16 125/87 100 %        Physical Exam   Constitutional: He is oriented to person, place, and time. Russel Carrel male appears older than stated age in mild distress. HENT:   Head: Normocephalic and atraumatic. Mildly dry MM     Eyes: Conjunctivae and EOM are normal.   Neck: Normal range of motion. Neck supple. No JVD present. Cardiovascular: Normal rate, regular rhythm and intact distal pulses. Pulmonary/Chest: Effort normal and breath sounds normal. No respiratory distress. He has no wheezes. Abdominal: Soft. Bowel sounds are normal. He exhibits no distension and no mass. There is no tenderness. There is no rebound and no guarding. Musculoskeletal: Normal range of motion. He exhibits no edema, tenderness or deformity. Neurological: He is alert and oriented to person, place, and time. He exhibits normal muscle tone. Skin: Skin is warm and dry. No rash noted. No erythema. Psychiatric: He has a normal mood and affect. MDM  Number of Diagnoses or Management Options  Nausea without vomiting:   Diagnosis management comments: Extremely well appearing but with multiple chronic medical problems. Try oral anti-emetics and hold IV, labs. Amount and/or Complexity of Data Reviewed  Review and summarize past medical records: yes    Patient Progress  Patient progress: stable    ED Course       Procedures  05:30 No further nausea, requesting narcotic for his pain. Agreed to oral medication and PO challenge. MEDICATIONS GIVEN:  Medications   ondansetron (ZOFRAN ODT) tablet 8 mg (8 mg Oral Given 3/24/17 3818)   oxyCODONE-acetaminophen (PERCOCET) 5-325 mg per tablet 2 Tab (2 Tabs Oral Given 3/24/17 6730)       IMPRESSION:  1. Nausea without vomiting        PLAN:  1. Discharge Medication List as of 3/24/2017  5:29 AM        2.    Follow-up Information     Follow up With Details Comments Contact Info    [facility] EMERGENCY DEPT  If symptoms worsen [address] [address]  [phone]        Return to ED if worse     Discharge Note:  6:14 AM  The pt is ready for discharge. The pt's signs, symptoms, diagnosis, and discharge instructions have been discussed and pt has conveyed their understanding. The pt is to follow up as recommended or return to ER should their symptoms worsen. Plan has been discussed and pt is in agreement. This note is prepared by Grace Arrington, acting as a Scribe for Governjosiah Lynn MD.    Governor MD Shane: The scribe's documentation has been prepared under my direction and personally reviewed by me in its entirety. I confirm that the notes above accurately reflects all work, treatment, procedures, and medical decision making performed by me.

## 2017-06-01 ENCOUNTER — APPOINTMENT (OUTPATIENT)
Dept: CT IMAGING | Age: 42
DRG: 871 | End: 2017-06-01
Attending: EMERGENCY MEDICINE
Payer: MEDICARE

## 2017-06-01 ENCOUNTER — HOSPITAL ENCOUNTER (INPATIENT)
Age: 42
LOS: 5 days | Discharge: HOME | DRG: 871 | End: 2017-06-06
Attending: EMERGENCY MEDICINE | Admitting: HOSPITALIST
Payer: MEDICARE

## 2017-06-01 ENCOUNTER — APPOINTMENT (OUTPATIENT)
Dept: GENERAL RADIOLOGY | Age: 42
DRG: 871 | End: 2017-06-01
Attending: EMERGENCY MEDICINE
Payer: MEDICARE

## 2017-06-01 DIAGNOSIS — N18.6 ESRD (END STAGE RENAL DISEASE) ON DIALYSIS (HCC): ICD-10-CM

## 2017-06-01 DIAGNOSIS — R65.20 SEVERE SEPSIS (HCC): Primary | ICD-10-CM

## 2017-06-01 DIAGNOSIS — Z99.2 ESRD (END STAGE RENAL DISEASE) ON DIALYSIS (HCC): ICD-10-CM

## 2017-06-01 DIAGNOSIS — A41.9 SEVERE SEPSIS (HCC): Primary | ICD-10-CM

## 2017-06-01 LAB
ALBUMIN SERPL BCP-MCNC: 3.7 G/DL (ref 3.5–5)
ALBUMIN/GLOB SERPL: 0.9 {RATIO} (ref 1.1–2.2)
ALP SERPL-CCNC: 209 U/L (ref 45–117)
ALT SERPL-CCNC: 13 U/L (ref 12–78)
ANION GAP BLD CALC-SCNC: 9 MMOL/L (ref 5–15)
AST SERPL W P-5'-P-CCNC: 14 U/L (ref 15–37)
BASOPHILS # BLD AUTO: 0.3 K/UL
BASOPHILS # BLD: 2 %
BILIRUB SERPL-MCNC: 0.7 MG/DL (ref 0.2–1)
BUN SERPL-MCNC: 10 MG/DL (ref 6–20)
BUN/CREAT SERPL: 2 (ref 12–20)
CALCIUM SERPL-MCNC: 9.6 MG/DL (ref 8.5–10.1)
CHLORIDE SERPL-SCNC: 95 MMOL/L (ref 97–108)
CO2 SERPL-SCNC: 30 MMOL/L (ref 21–32)
CREAT SERPL-MCNC: 6.32 MG/DL (ref 0.7–1.3)
DIFFERENTIAL METHOD BLD: ABNORMAL
EOSINOPHIL # BLD: 0.6 K/UL
EOSINOPHIL NFR BLD: 4 %
ERYTHROCYTE [DISTWIDTH] IN BLOOD BY AUTOMATED COUNT: 13.7 % (ref 11.5–14.5)
GLOBULIN SER CALC-MCNC: 4.1 G/DL (ref 2–4)
GLUCOSE SERPL-MCNC: 89 MG/DL (ref 65–100)
HCT VFR BLD AUTO: 28.8 % (ref 36.6–50.3)
HGB BLD-MCNC: 9.8 G/DL (ref 12.1–17)
INR PPP: 3.4 (ref 0.9–1.1)
LACTATE SERPL-SCNC: 2.2 MMOL/L (ref 0.4–2)
LACTATE SERPL-SCNC: 2.5 MMOL/L (ref 0.4–2)
LIPASE SERPL-CCNC: 62 U/L (ref 73–393)
LYMPHOCYTES # BLD AUTO: 6 %
LYMPHOCYTES # BLD: 1 K/UL
MCH RBC QN AUTO: 33 PG (ref 26–34)
MCHC RBC AUTO-ENTMCNC: 34 G/DL (ref 30–36.5)
MCV RBC AUTO: 97 FL (ref 80–99)
MONOCYTES # BLD: 1 K/UL
MONOCYTES NFR BLD AUTO: 6 %
NEUTS SEG # BLD: 13.3 K/UL
NEUTS SEG NFR BLD AUTO: 82 %
PLATELET # BLD AUTO: 172 K/UL (ref 150–400)
POTASSIUM SERPL-SCNC: 2.7 MMOL/L (ref 3.5–5.1)
PROT SERPL-MCNC: 7.8 G/DL (ref 6.4–8.2)
PROTHROMBIN TIME: 36.1 SEC (ref 9–11.1)
RBC # BLD AUTO: 2.97 M/UL (ref 4.1–5.7)
RBC MORPH BLD: ABNORMAL
SODIUM SERPL-SCNC: 134 MMOL/L (ref 136–145)
WBC # BLD AUTO: 16.2 K/UL (ref 4.1–11.1)

## 2017-06-01 PROCEDURE — 74011250636 HC RX REV CODE- 250/636: Performed by: HOSPITALIST

## 2017-06-01 PROCEDURE — 74011250637 HC RX REV CODE- 250/637: Performed by: EMERGENCY MEDICINE

## 2017-06-01 PROCEDURE — 85610 PROTHROMBIN TIME: CPT | Performed by: HOSPITALIST

## 2017-06-01 PROCEDURE — 85025 COMPLETE CBC W/AUTO DIFF WBC: CPT | Performed by: EMERGENCY MEDICINE

## 2017-06-01 PROCEDURE — 96375 TX/PRO/DX INJ NEW DRUG ADDON: CPT

## 2017-06-01 PROCEDURE — 87077 CULTURE AEROBIC IDENTIFY: CPT | Performed by: EMERGENCY MEDICINE

## 2017-06-01 PROCEDURE — 74011250637 HC RX REV CODE- 250/637: Performed by: HOSPITALIST

## 2017-06-01 PROCEDURE — 74011250636 HC RX REV CODE- 250/636: Performed by: EMERGENCY MEDICINE

## 2017-06-01 PROCEDURE — 80053 COMPREHEN METABOLIC PANEL: CPT | Performed by: EMERGENCY MEDICINE

## 2017-06-01 PROCEDURE — 74011250637 HC RX REV CODE- 250/637: Performed by: INTERNAL MEDICINE

## 2017-06-01 PROCEDURE — 74177 CT ABD & PELVIS W/CONTRAST: CPT

## 2017-06-01 PROCEDURE — 99284 EMERGENCY DEPT VISIT MOD MDM: CPT

## 2017-06-01 PROCEDURE — 83690 ASSAY OF LIPASE: CPT | Performed by: HOSPITALIST

## 2017-06-01 PROCEDURE — 93005 ELECTROCARDIOGRAM TRACING: CPT

## 2017-06-01 PROCEDURE — 83605 ASSAY OF LACTIC ACID: CPT | Performed by: EMERGENCY MEDICINE

## 2017-06-01 PROCEDURE — 96365 THER/PROPH/DIAG IV INF INIT: CPT

## 2017-06-01 PROCEDURE — 96367 TX/PROPH/DG ADDL SEQ IV INF: CPT

## 2017-06-01 PROCEDURE — 87147 CULTURE TYPE IMMUNOLOGIC: CPT | Performed by: EMERGENCY MEDICINE

## 2017-06-01 PROCEDURE — 87040 BLOOD CULTURE FOR BACTERIA: CPT | Performed by: EMERGENCY MEDICINE

## 2017-06-01 PROCEDURE — 36415 COLL VENOUS BLD VENIPUNCTURE: CPT | Performed by: EMERGENCY MEDICINE

## 2017-06-01 PROCEDURE — 87186 SC STD MICRODIL/AGAR DIL: CPT | Performed by: EMERGENCY MEDICINE

## 2017-06-01 PROCEDURE — 74011636320 HC RX REV CODE- 636/320: Performed by: EMERGENCY MEDICINE

## 2017-06-01 PROCEDURE — 74011000250 HC RX REV CODE- 250: Performed by: EMERGENCY MEDICINE

## 2017-06-01 PROCEDURE — 74011000250 HC RX REV CODE- 250: Performed by: HOSPITALIST

## 2017-06-01 PROCEDURE — 65660000000 HC RM CCU STEPDOWN

## 2017-06-01 PROCEDURE — 71020 XR CHEST PA LAT: CPT

## 2017-06-01 RX ORDER — POTASSIUM CHLORIDE 7.45 MG/ML
10 INJECTION INTRAVENOUS
Status: COMPLETED | OUTPATIENT
Start: 2017-06-01 | End: 2017-06-01

## 2017-06-01 RX ORDER — METRONIDAZOLE 500 MG/100ML
500 INJECTION, SOLUTION INTRAVENOUS
Status: COMPLETED | OUTPATIENT
Start: 2017-06-01 | End: 2017-06-01

## 2017-06-01 RX ORDER — OXYCODONE AND ACETAMINOPHEN 5; 325 MG/1; MG/1
1 TABLET ORAL
Status: DISCONTINUED | OUTPATIENT
Start: 2017-06-01 | End: 2017-06-06 | Stop reason: HOSPADM

## 2017-06-01 RX ORDER — CALCITRIOL 0.5 UG/1
0.5 CAPSULE ORAL DAILY
COMMUNITY

## 2017-06-01 RX ORDER — POTASSIUM CHLORIDE 750 MG/1
40 TABLET, FILM COATED, EXTENDED RELEASE ORAL
Status: COMPLETED | OUTPATIENT
Start: 2017-06-01 | End: 2017-06-01

## 2017-06-01 RX ORDER — FENTANYL CITRATE 50 UG/ML
50 INJECTION, SOLUTION INTRAMUSCULAR; INTRAVENOUS
Status: COMPLETED | OUTPATIENT
Start: 2017-06-01 | End: 2017-06-01

## 2017-06-01 RX ORDER — METRONIDAZOLE 500 MG/100ML
500 INJECTION, SOLUTION INTRAVENOUS EVERY 8 HOURS
Status: DISCONTINUED | OUTPATIENT
Start: 2017-06-01 | End: 2017-06-03

## 2017-06-01 RX ORDER — WARFARIN 2.5 MG/1
2.5 TABLET ORAL
COMMUNITY
End: 2017-11-09 | Stop reason: CLARIF

## 2017-06-01 RX ORDER — ACETAMINOPHEN 325 MG/1
TABLET ORAL
Status: DISPENSED
Start: 2017-06-01 | End: 2017-06-02

## 2017-06-01 RX ORDER — GUAIFENESIN 100 MG/5ML
81 LIQUID (ML) ORAL DAILY
Status: DISCONTINUED | OUTPATIENT
Start: 2017-06-02 | End: 2017-06-06 | Stop reason: HOSPADM

## 2017-06-01 RX ORDER — OMEPRAZOLE 20 MG/1
20 CAPSULE, DELAYED RELEASE ORAL DAILY
COMMUNITY
End: 2021-12-24

## 2017-06-01 RX ORDER — VANCOMYCIN HYDROCHLORIDE
1250 ONCE
Status: COMPLETED | OUTPATIENT
Start: 2017-06-01 | End: 2017-06-01

## 2017-06-01 RX ORDER — FENTANYL CITRATE 50 UG/ML
25 INJECTION, SOLUTION INTRAMUSCULAR; INTRAVENOUS
Status: DISCONTINUED | OUTPATIENT
Start: 2017-06-01 | End: 2017-06-01

## 2017-06-01 RX ORDER — FENTANYL CITRATE 50 UG/ML
25 INJECTION, SOLUTION INTRAMUSCULAR; INTRAVENOUS
Status: DISCONTINUED | OUTPATIENT
Start: 2017-06-01 | End: 2017-06-06 | Stop reason: HOSPADM

## 2017-06-01 RX ORDER — SODIUM CHLORIDE 0.9 % (FLUSH) 0.9 %
5-10 SYRINGE (ML) INJECTION EVERY 8 HOURS
Status: DISCONTINUED | OUTPATIENT
Start: 2017-06-01 | End: 2017-06-06 | Stop reason: HOSPADM

## 2017-06-01 RX ORDER — LEVOFLOXACIN 5 MG/ML
250 INJECTION, SOLUTION INTRAVENOUS ONCE
Status: COMPLETED | OUTPATIENT
Start: 2017-06-01 | End: 2017-06-01

## 2017-06-01 RX ORDER — LEVOFLOXACIN 5 MG/ML
500 INJECTION, SOLUTION INTRAVENOUS
Status: DISCONTINUED | OUTPATIENT
Start: 2017-06-03 | End: 2017-06-03

## 2017-06-01 RX ORDER — SODIUM CHLORIDE 0.9 % (FLUSH) 0.9 %
5-10 SYRINGE (ML) INJECTION AS NEEDED
Status: DISCONTINUED | OUTPATIENT
Start: 2017-06-01 | End: 2017-06-06 | Stop reason: HOSPADM

## 2017-06-01 RX ORDER — LEVOFLOXACIN 5 MG/ML
750 INJECTION, SOLUTION INTRAVENOUS
Status: DISCONTINUED | OUTPATIENT
Start: 2017-06-03 | End: 2017-06-01

## 2017-06-01 RX ORDER — PANTOPRAZOLE SODIUM 40 MG/1
40 TABLET, DELAYED RELEASE ORAL
Status: DISCONTINUED | OUTPATIENT
Start: 2017-06-02 | End: 2017-06-06 | Stop reason: HOSPADM

## 2017-06-01 RX ORDER — LACOSAMIDE 100 MG/1
50 TABLET ORAL 2 TIMES DAILY
Status: DISCONTINUED | OUTPATIENT
Start: 2017-06-01 | End: 2017-06-06 | Stop reason: HOSPADM

## 2017-06-01 RX ORDER — SEVELAMER HYDROCHLORIDE 800 MG/1
800 TABLET, FILM COATED ORAL
Status: DISCONTINUED | OUTPATIENT
Start: 2017-06-01 | End: 2017-06-02

## 2017-06-01 RX ORDER — CALCITRIOL 0.25 UG/1
0.5 CAPSULE ORAL DAILY
Status: DISCONTINUED | OUTPATIENT
Start: 2017-06-02 | End: 2017-06-06 | Stop reason: HOSPADM

## 2017-06-01 RX ORDER — ACETAMINOPHEN 325 MG/1
650 TABLET ORAL
Status: DISCONTINUED | OUTPATIENT
Start: 2017-06-01 | End: 2017-06-06 | Stop reason: HOSPADM

## 2017-06-01 RX ORDER — LEVOFLOXACIN 5 MG/ML
500 INJECTION, SOLUTION INTRAVENOUS
Status: COMPLETED | OUTPATIENT
Start: 2017-06-01 | End: 2017-06-01

## 2017-06-01 RX ORDER — SODIUM CHLORIDE 0.9 % (FLUSH) 0.9 %
10 SYRINGE (ML) INJECTION
Status: COMPLETED | OUTPATIENT
Start: 2017-06-01 | End: 2017-06-01

## 2017-06-01 RX ORDER — SODIUM CHLORIDE 9 MG/ML
50 INJECTION, SOLUTION INTRAVENOUS
Status: COMPLETED | OUTPATIENT
Start: 2017-06-01 | End: 2017-06-01

## 2017-06-01 RX ORDER — PROCHLORPERAZINE EDISYLATE 5 MG/ML
5 INJECTION INTRAMUSCULAR; INTRAVENOUS
Status: DISCONTINUED | OUTPATIENT
Start: 2017-06-01 | End: 2017-06-06 | Stop reason: HOSPADM

## 2017-06-01 RX ORDER — ONDANSETRON 2 MG/ML
4 INJECTION INTRAMUSCULAR; INTRAVENOUS
Status: COMPLETED | OUTPATIENT
Start: 2017-06-01 | End: 2017-06-01

## 2017-06-01 RX ADMIN — Medication 10 ML: at 15:16

## 2017-06-01 RX ADMIN — LEVOFLOXACIN 500 MG: 5 INJECTION, SOLUTION INTRAVENOUS at 10:14

## 2017-06-01 RX ADMIN — FENTANYL CITRATE 25 MCG: 50 INJECTION, SOLUTION INTRAMUSCULAR; INTRAVENOUS at 18:34

## 2017-06-01 RX ADMIN — FENTANYL CITRATE 50 MCG: 50 INJECTION, SOLUTION INTRAMUSCULAR; INTRAVENOUS at 11:01

## 2017-06-01 RX ADMIN — FENTANYL CITRATE 25 MCG: 50 INJECTION, SOLUTION INTRAMUSCULAR; INTRAVENOUS at 14:10

## 2017-06-01 RX ADMIN — POTASSIUM CHLORIDE 10 MEQ: 10 INJECTION, SOLUTION INTRAVENOUS at 12:44

## 2017-06-01 RX ADMIN — SODIUM CHLORIDE 500 ML: 900 INJECTION, SOLUTION INTRAVENOUS at 16:32

## 2017-06-01 RX ADMIN — METRONIDAZOLE 500 MG: 500 INJECTION, SOLUTION INTRAVENOUS at 17:39

## 2017-06-01 RX ADMIN — METRONIDAZOLE 500 MG: 500 INJECTION, SOLUTION INTRAVENOUS at 22:29

## 2017-06-01 RX ADMIN — LEVETIRACETAM 750 MG: 250 TABLET, FILM COATED ORAL at 18:33

## 2017-06-01 RX ADMIN — FENTANYL CITRATE 25 MCG: 50 INJECTION, SOLUTION INTRAMUSCULAR; INTRAVENOUS at 22:27

## 2017-06-01 RX ADMIN — SODIUM CHLORIDE 250 ML: 900 INJECTION, SOLUTION INTRAVENOUS at 12:43

## 2017-06-01 RX ADMIN — IOPAMIDOL 100 ML: 755 INJECTION, SOLUTION INTRAVENOUS at 10:50

## 2017-06-01 RX ADMIN — LEVOFLOXACIN 250 MG: 5 INJECTION, SOLUTION INTRAVENOUS at 16:30

## 2017-06-01 RX ADMIN — OXYCODONE HYDROCHLORIDE AND ACETAMINOPHEN 1 TABLET: 5; 325 TABLET ORAL at 16:20

## 2017-06-01 RX ADMIN — ONDANSETRON HYDROCHLORIDE 4 MG: 2 INJECTION, SOLUTION INTRAMUSCULAR; INTRAVENOUS at 10:12

## 2017-06-01 RX ADMIN — SODIUM CHLORIDE 250 ML: 900 INJECTION, SOLUTION INTRAVENOUS at 15:34

## 2017-06-01 RX ADMIN — LACOSAMIDE 50 MG: 100 TABLET, FILM COATED ORAL at 18:33

## 2017-06-01 RX ADMIN — RENAGEL 800 MG: 800 TABLET ORAL at 18:33

## 2017-06-01 RX ADMIN — METRONIDAZOLE 500 MG: 500 INJECTION, SOLUTION INTRAVENOUS at 11:34

## 2017-06-01 RX ADMIN — Medication 10 ML: at 22:29

## 2017-06-01 RX ADMIN — Medication 10 ML: at 10:50

## 2017-06-01 RX ADMIN — VANCOMYCIN HYDROCHLORIDE 1250 MG: 10 INJECTION, POWDER, LYOPHILIZED, FOR SOLUTION INTRAVENOUS at 15:15

## 2017-06-01 RX ADMIN — ACETAMINOPHEN 650 MG: 325 TABLET, FILM COATED ORAL at 20:34

## 2017-06-01 RX ADMIN — POTASSIUM CHLORIDE 40 MEQ: 750 TABLET, FILM COATED, EXTENDED RELEASE ORAL at 11:01

## 2017-06-01 RX ADMIN — SODIUM CHLORIDE 50 ML/HR: 900 INJECTION, SOLUTION INTRAVENOUS at 10:50

## 2017-06-01 NOTE — ROUTINE PROCESS
TRANSFER - OUT REPORT:    Verbal report given to Ellen Caro RN (name) on Alison Franco  being transferred to ***(unit) for {TRANSFER CARE:56232}       Report consisted of patients Situation, Background, Assessment and   Recommendations(SBAR). Information from the following report(s) {SBAR REPORTS OEFS:05001} was reviewed with the receiving nurse. Lines:   Peripheral IV 06/01/17 Left;Upper Antecubital (Active)   Site Assessment Clean, dry, & intact 6/1/2017  9:47 AM   Phlebitis Assessment 0 6/1/2017  9:47 AM   Infiltration Assessment 0 6/1/2017  9:47 AM   Dressing Type Transparent 6/1/2017  9:47 AM   Hub Color/Line Status Pink 6/1/2017  9:47 AM   Action Taken Blood drawn 6/1/2017  9:47 AM        Opportunity for questions and clarification was provided.       Patient transported with:   {TRANSPORTDETAILS:62352}

## 2017-06-01 NOTE — H&P
Hospitalist Admission Note    NAME: Jill Hubbard   :  1975   MRN:  [id number]     Date/Time:  2017 12:48 PM    Patient PCP: Stiven Phillips MD at [facility]  Cardiologist:  Dr. Carley Valdovinos  Renal:  Dr. Guadalupe Walton  ______________________________________________________________________   Assessment & Plan:  Severe Sepsis, POA (fever 101.1, WBC 16K, lactic acid 2.2)  Intra-abdominal fluid collections, POA (posterior right hepatic lobe small thin-walled  collection of fluid posterior right hepatic lobe, measuring 9 mm x 3.5 cm and  measured 1.4 cm x 4.4 cm on prior CT dated 2017, and crescentic thin walled fluid collection medial to the left hepatic lobe, decreased in size measuring approximately 2.5 cm x 11 cm and measuring approximately 3.4 cm x 13 cm on prior CT dated 2017)  Hx necrotic bowel s/p appy   Hx gallstone pancreatitis s/p aashish   Hx frequent abdominal pain without organic etiology  --unclear etiology of sepsis. Ddx: Bacteremia, viral illness, intra-abdominal infection (has abdominal pain but this appears to be chronic by chart). Unclear significance of fluid collections seen in and near liver--could they be abscess?  --empiric abx with vancomycin, levaquin, flagyl. General surgery consult. Check lipase    ESRD on HD at home  --consult nephrology. Finished home session today prior to arrival    CAD s/p PCI LAD with bare metal stent  [facility] EF 15% s/p AICD  --continue aspirin. Hold plavix in case need any invasive intervention pending surgical consult    Hx recurrent left leg DVT  S/p left leg bypass   --on coumadin. Check INR. Would hold coumadin pending surgical evaluation. If INR subtherapeutic, would put on argatroban drip.   No heparin since hx HIT    Hx heparin induced thrombocytopenia  --no heparin products    Hx seizure d/o  --continue keppra and vimpat    Hx nephrotic syndrome causing renal failure  Hx failed cadaveric renal transplants x 2    Code: full  DVT prophylaxis: coumadin, INR pending; SCD  Surrogate decision maker:  sister        Subjective:   CHIEF COMPLAINT:  Fever, myalgia, abdominal pain    HISTORY OF PRESENT ILLNESS:     Alison Franco is a 46 y.o.  male who presents with acute onset fever 101.9, body aches, nausea without vomiting, sharp LLQ abdominal pain radiate into back, headache from top of head radiate down into spine. Symptoms developed 5 minutes into starting home dialysis session today. Took tylenol without improvement in fever. Report hx chronic intermittent abdominal pain since 2011. In 7/2011, had lap aashish for gallstone pancreatitis by Dr. Alyce Hughes. Later in 11/2/2011 had SBO, underwent diagnostic laparoscopy and lap appy by Dr. Ottoniel Duffy. Two weeks later in 2011, had abdominal sepsis and underwent exploratory laparotomy with small bowel resection for small bowel necrosis. Then 12/2/2011 had wound dehiscence, had exploratory laparotomy and repair of anastomotic leak. Report since then, has intermittent flare up of abdominal pain similar to this. Denies any diarrhea, constipation, sign of GI bleeding. He is anuric, denies any penile discharge. Last admissions in 9/2014 x 2 for acute encephalopathy suspected from opiate or other illicit drug use for chronic abdominal pain. Report hx of diverticulitis    WBC 16.2, lactic acid 2.2, fever 101.1. CT abdomen shows no bowel obstruction or perforation. There is small thin-walled  collection of fluid posterior right hepatic lobe, measuring 9 mm x 3.5 cm and measured 1.4 cm x 4.4 cm on prior CT dated February 2017. Kidneys: The kidneys are markedly atrophic and several renal cysts are noted. Within the left hemipelvis, there is a partially calcified atrophic kidney,  unchanged.  There is a crescentic thin walled fluid collection medial to the left  hepatic lobe, decreased in size measuring approximately 2.5 cm x 11 cm and  measuring approximately 3.4 cm x 13 cm on prior CT dated February 2017. We were asked to admit for work up and evaluation of the above problems. Past Medical History:   Diagnosis Date    Abdominal hematoma     AICD (automatic cardioverter/defibrillator) present     CAD (coronary artery disease)     anterior MI s/p 2 stents  2007    Chronic abdominal pain     Chronic kidney disease     ESRD; Dialysis dependent.      DVT (deep venous thrombosis) ([facility] [address] 2001    DVT of popliteal vein ([facility] [address] 11/2011    not anticoagulated due to bleeding, IVC filter    Gallstone pancreatitis     Gastrointestinal disorder     acid reflux    Gastrointestinal disorder     peptic ulcer    Hemodialysis patient ([facility] [address]     High cholesterol     Hypertension     Kidney transplant     b/l kidneys 1995, 2001    Nephrotic syndrome     Other ill-defined conditions     kidny transplant x2,  on dialysis    Other ill-defined conditions     home dialysis    Other ill-defined conditions     hx recurrent left leg DVT    Peritonitis (HCC)     Seizures (HCC)     Small bowel obstruction (HCC)     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    V-tach ([facility] [address]       Past Surgical History:   Procedure Laterality Date    HX APPENDECTOMY      HX CHOLECYSTECTOMY      HX OTHER SURGICAL      cholecystectomy    HX OTHER SURGICAL  11/2011    exploratory laparotomy    HX OTHER SURGICAL      fem-pop    HX OTHER SURGICAL  02/2013    right upper extremity fistula    HX PACEMAKER  6/2009    AICD    HX SMALL BOWEL RESECTION      HX TRANSPLANT  2001     Kidney    HX UROLOGICAL      2 kidney transplants    DC EDG US EXAM SURGICAL ALTER STOM DUODENUM/JEJUNUM  7/15/2011         DC ESOPHAGOGASTRODUODENOSCOPY TRANSORAL DIAGNOSTIC  5/24/2012          Social History   Substance Use Topics    Smoking status: Never Smoker    Smokeless tobacco: Never Used    Alcohol use No    Drug use:  Denies  Lives sister Alfredo Nguyen    Family History   Problem Relation Age of Onset    COPD Mother     Lung Disease Mother     Cancer Father      stomach    Cancer Maternal Grandmother       Allergies   Allergen Reactions    Shellfish Containing Products Swelling     Break out in rash, trouble breathing    Heparin Analogues Other (comments)     Positive history of HIT    Iodinated Contrast Media - Oral And Iv Dye Other (comments)     Because of renal disease. No rash or hives per patient report 1/14/2012        Prior to Admission medications    Medication Sig Start Date End Date Taking? Authorizing Provider   levETIRAcetam (KEPPRA) 750 mg tablet Take 750 mg by mouth two (2) times a day. Morro Padron MD   lacosamide (VIMPAT) 50 mg tab tablet Take 50 mg by mouth two (2) times a day. Morro Padron MD   atorvastatin (LIPITOR) 20 mg tablet Take 20 mg by mouth daily. Morro Padron MD   warfarin (COUMADIN) 5 mg tablet Decrease coumadin dose to 2.5mg while taking these antibiotics. Recheck INR every 2 days until you finish the antibiotics. Patient taking differently: Take 3.5 mg by mouth daily. 9/9/15   Kannan Castañeda MD   pantoprazole (PROTONIX) 40 mg tablet Take 40 mg by mouth daily. Morro Padron MD   clopidogrel (PLAVIX) 75 mg tablet Take  by mouth daily. Morro Padron MD   sevelamer carbonate (RENVELA) 800 mg Tab tab Take 2,400 mg by mouth three (3) times daily (with meals). Morro Padron MD   aspirin 81 mg chewable tablet Take 81 mg by mouth daily. Morro Padron MD     REVIEW OF SYSTEMS:  POSITIVE= Bold.   Negative = normal text  General:  fever, chills, sweats, generalized weakness, weight loss/gain, loss of appetite  Eyes:  blurred vision, eye pain, loss of vision, diplopia  Ear Nose and Throat:  rhinorrhea, pharyngitis  Respiratory:   cough, sputum production, SOB, wheezing, ZAMUDIO, pleuritic pain  Cardiology:  chest pain, palpitations, orthopnea, PND, edema, syncope   Gastrointestinal:  abdominal pain, N/V, dysphagia, diarrhea, constipation, bleeding  Genitourinary:  frequency, urgency, dysuria, hematuria, incontinence  Muskuloskeletal :  Arthralgia, back pain, myalgia  Hematology:  easy bruising, bleeding, lymphadenopathy  Dermatological:  rash, ulceration, pruritis  Endocrine:  hot flashes or polydipsia  Neurological:  headache, dizziness, confusion, focal weakness, paresthesia, memory loss, gait disturbance  Psychological: anxiety, depression, agitation      Objective:   VITALS:    Visit Vitals    /79 (BP 1 Location: Left arm, BP Patient Position: At rest;Sitting)    Pulse 99    Temp 99.3 °F (37.4 °C)    Resp 18    Ht 5' 7\" (1.702 m)    Wt 52.5 kg (115 lb 11.9 oz)    SpO2 100%    BMI 18.13 kg/m2     Temp (24hrs), Av.2 °F (37.9 °C), Min:99.3 °F (37.4 °C), Max:101.1 °F (38.4 °C)    Body mass index is 18.13 kg/(m^2). PHYSICAL EXAM:    General:    Alert, thin, cooperative, no distress, appears stated age. HEENT: Atraumatic, anicteric sclerae, pink conjunctivae     No oral ulcers, mucosa moist, throat clear. Hearing intact. Neck:  Supple, symmetrical,  thyroid: non tender  Lungs:   Clear to auscultation bilaterally. No Wheezing or Rhonchi. No rales. Chest wall:  No tenderness  No Accessory muscle use. Heart:   Regular  rhythm,  No  murmur   No gallop. No edema. Abdomen:   Soft, moderate diffuse tenderness, not distended. Bowel sounds normal. No masses  Extremities: No cyanosis. No clubbing. Right upper arm fistula with thrill, no redness or tenderness  Skin:     Not pale Not Jaundiced  No rashes   Psych:  Good insight. Not depressed. Not anxious or agitated. Neurologic: EOMs intact. No facial asymmetry. No aphasia or slurred speech. Symmetrical strength, Alert and oriented X 3.    Peripheral pulse: Bilateral, Radial, 2+  Capillary refill:  normal    IMAGING RESULTS:   []       I have personally reviewed the actual   []     CXR  []     CT scan  CXR:  CT :  EKG:  SR 91, prolonged QT, TWI inferior  ________________________________________________________________________  Care Plan discussed with:    Comments   Patient y    [facility]:      ________________________________________________________________________  Prophylaxis:  GI PPI   DVT coumadin   ________________________________________________________________________  Recommended Disposition:   Home with Family y   HH/PT/OT/RN    SNF/LTC    [facility]    ________________________________________________________________________  Code Status:  Full Code y   DNR/DNI    ________________________________________________________________________  TOTAL TIME:  54 minutes      Comments    y Reviewed previous records       ______________________________________________________________________  Christy Nailer, MD      Procedures: see electronic medical records for all procedures/Xrays and details which were not copied into this note but were reviewed prior to creation of Plan. LAB DATA REVIEWED:    Recent Results (from the past 24 hour(s))   CBC WITH AUTOMATED DIFF    Collection Time: 06/01/17  9:46 AM   Result Value Ref Range    WBC 16.2 (H) 4.1 - 11.1 K/uL    RBC 2.97 (L) 4.10 - 5.70 M/uL    HGB 9.8 (L) 12.1 - 17.0 g/dL    HCT 28.8 (L) 36.6 - 50.3 %    MCV 97.0 80.0 - 99.0 FL    MCH 33.0 26.0 - 34.0 PG    MCHC 34.0 30.0 - 36.5 g/dL    RDW 13.7 11.5 - 14.5 %    PLATELET 763 795 - 836 K/uL    NEUTROPHILS 82 %    LYMPHOCYTES 6 %    MONOCYTES 6 %    EOSINOPHILS 4 %    BASOPHILS 2 %    ABS. NEUTROPHILS 13.3 K/UL    ABS. LYMPHOCYTES 1.0 K/UL    ABS. MONOCYTES 1.0 K/UL    ABS. EOSINOPHILS 0.6 K/UL    ABS.  BASOPHILS 0.3 K/UL    RBC COMMENTS NORMOCYTIC, NORMOCHROMIC      DF MANUAL     METABOLIC PANEL, COMPREHENSIVE    Collection Time: 06/01/17  9:46 AM   Result Value Ref Range    Sodium 134 (L) 136 - 145 mmol/L    Potassium 2.7 (LL) 3.5 - 5.1 mmol/L    Chloride 95 (L) 97 - 108 mmol/L    CO2 30 21 - 32 mmol/L    Anion gap 9 5 - 15 mmol/L    Glucose 89 65 - 100 mg/dL    BUN 10 6 - 20 MG/DL    Creatinine 6.32 (H) 0.70 - 1.30 MG/DL    BUN/Creatinine ratio 2 (L) 12 - 20      GFR est AA 12 (L) >60 ml/min/1.73m2    GFR est non-AA 10 (L) >60 ml/min/1.73m2    Calcium 9.6 8.5 - 10.1 MG/DL    Bilirubin, total 0.7 0.2 - 1.0 MG/DL    ALT (SGPT) 13 12 - 78 U/L    AST (SGOT) 14 (L) 15 - 37 U/L    Alk.  phosphatase 209 (H) 45 - 117 U/L    Protein, total 7.8 6.4 - 8.2 g/dL    Albumin 3.7 3.5 - 5.0 g/dL    Globulin 4.1 (H) 2.0 - 4.0 g/dL    A-G Ratio 0.9 (L) 1.1 - 2.2     LACTIC ACID, PLASMA    Collection Time: 06/01/17  9:46 AM   Result Value Ref Range    Lactic acid 2.2 (HH) 0.4 - 2.0 MMOL/L   EKG, 12 LEAD, INITIAL    Collection Time: 06/01/17 12:06 PM   Result Value Ref Range    Ventricular Rate 91 BPM    Atrial Rate 91 BPM    P-R Interval 166 ms    QRS Duration 110 ms    Q-T Interval 412 ms    QTC Calculation (Bezet) 506 ms    Calculated P Axis 57 degrees    Calculated R Axis 49 degrees    Calculated T Axis -81 degrees    Diagnosis       Normal sinus rhythm  Possible Left atrial enlargement  T wave abnormality, consider inferior ischemia  Prolonged QT  Abnormal ECG  When compared with ECG of 12-DEC-2016 08:37,  No significant change was found

## 2017-06-01 NOTE — PROGRESS NOTES
Pharmacy Automatic Renal Dosing Protocol - Antimicrobials      Indication for Antimicrobials: Sepsis   Current Regimen of Each Antimicrobial (Start Day & Day of Therapy):  Vancomycin 1250 mg IV load followed by 750 mg IV after dialysis ( Day #1)    Significant cultures:    Blood: pending      CAPD, Intermittent Hemodialysis or Renal Replacement Therapy: HD  Paralysis, amputations, malnutrition: None documented  Recent Labs      17   0946   CREA  6.32*   BUN  10   WBC  16.2*     Temp (24hrs), Av.2 °F (37.9 °C), Min:99.3 °F (37.4 °C), Max:101.1 °F (38.4 °C)    Creatinine Clearance (ml/min): HD      Goal Vancomycin Level(s): 15-20 mcg/mL      Impression/Plan: Will order vancomycin 1250 gm IV load followed by 750 mg IV after dialysis. Pharmacy will follow daily and adjust doses of monitored medications as appropriate for renal function and/or serum levels.     Thank you,  Nica Burns, CHIQUITAD

## 2017-06-01 NOTE — PROGRESS NOTES
1446: Report received from the laboratory regarding Mr. Rebecca Quezada. Lactic Acid resulted at 2.5, up from 2.2 at 0946. Dr. Mayank Contreras paged. 1500: Dr. Gerhardt Ganja notified. Orders were given for a 250 ml bolus of 0.9 normal saline followed by 500 ml at 100 /hr for 5 hours. No new orders given.

## 2017-06-01 NOTE — CONSULTS
Surgery Consult:  sepsis    Subjective:   Patient 46 y.o.  male s/p failed kidney transplant x 2 and is on home hemodialysis admitted today with sepsis. About 10 minutes into dialysis patient noted myalgia and fever up to 101 followed by nausea and vomiting x 2 and LLQ pain. Patient reports pain as sharp and it radiates to the back. It's constant and reports no palliative or provocative factors. No change in bowel habits. No diarrhea or constipation. On admission, patient had WBC of 16.2 and lactate of 2.2. CT scan showed no acute findings. Kidneys are markedly atrophic with several renal cysts; partially calcified atrophic kidney in left hemipelvis which is unchanged; crescentic thin walled fluid collection medial to the left hepatic lobe, decreased in size measuring approximately 2.5 cm x 11 cm and measuring approximately 3.4 cm x 13 cm on prior CT dated Fbruary 2017; a 2.9 cm x 2.8 cm rounded partially calcified mesenteric soft tissue lesion which is unchanged in size and morphology compared to prior CT dated February 2017. Of note, patient had multiple abdominal surgeries in the past including aashish, appy, renal transplant x 2, explant transplanted kidney x 1, and Ex Lap with SBR for ischemic bowel. Patient reports that his N/V is better but abdominal pain is about same. Patient is currently on levofloxacin, flagyl and Vanc. Patient is not on any immunosuppression. Past Medical & Surgical History:  Past Medical History:   Diagnosis Date    Abdominal hematoma     AICD (automatic cardioverter/defibrillator) present     CAD (coronary artery disease)     anterior MI s/p 2 stents  2007    Chronic abdominal pain     Chronic kidney disease     ESRD; Dialysis dependent.      DVT (deep venous thrombosis) ([address] 2001    DVT of popliteal vein ([address] 11/2011    not anticoagulated due to bleeding, IVC filter    Gallstone pancreatitis     Gastrointestinal disorder     acid reflux    Gastrointestinal disorder     peptic ulcer    Hemod[facility] ([facility] [address]     High cholesterol     Hypertension     Kidney transplant     b/l kidneys 1995, 2001    Nephrotic syndrome     Other ill-defined conditions     kidny transplant x2,  on dialysis    Other ill-defined conditions     home dialysis    Other ill-defined conditions     hx recurrent left leg DVT    Peritonitis (HCC)     Seizures (HCC)     Small bowel obstruction (HCC)     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[facility] [address]     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)       Past Surgical History:   Procedure Laterality Date    HX APPENDECTOMY      HX CHOLECYSTECTOMY      HX OTHER SURGICAL      cholecystectomy    HX OTHER SURGICAL  11/2011    exploratory laparotomy    HX OTHER SURGICAL      fem-pop    HX OTHER SURGICAL  02/2013    right upper extremity fistula    HX PACEMAKER  6/2009    AICD    HX SMALL BOWEL RESECTION      HX TRANSPLANT  2001     Kidney    HX UROLOGICAL      2 kidney transplants    [place] EDG US EXAM SURGICAL ALTER STOM DUODENUM/JEJUNUM  7/15/2011         [place] ESOPHAGOGASTRODUODENOSCOPY TRANSORAL DIAGNOSTIC  5/24/2012            Social History:  Social History     Social History    Marital status: SINGLE     Spouse name: N/A    Number of children: N/A    Years of education: N/A     Occupational History    Not on file.      Social History Main Topics    Smoking status: Never Smoker    Smokeless tobacco: Never Used    Alcohol use No    Drug use: No    Sexual activity: Not on file     Other Topics Concern    Not on file     Social History Narrative        Family History:  Family History   Problem Relation Age of Onset   Kemal Vyas COPD Mother     Lung Disease Mother     Cancer Father      stomach    Cancer Maternal Grandmother         Medications:  Current Facility-Administered Medications   Medication Dose Route Frequency    vancomycin (VANCOCIN) 1250 mg in  ml infusion  1,250 mg IntraVENous ONCE    vancomycin (VANCOCIN) 750 mg in 0.9% sodium chloride (MBP/ADV) 250 mL  750 mg IntraVENous DIALYSIS PRN    And    [START ON 6/2/2017] VANCOMYCIN INFORMATION NOTE   Other DAILY    sodium chloride (NS) flush 5-10 mL  5-10 mL IntraVENous Q8H    sodium chloride (NS) flush 5-10 mL  5-10 mL IntraVENous PRN    prochlorperazine (COMPAZINE) injection 5 mg  5 mg IntraVENous Q8H PRN    metroNIDAZOLE (FLAGYL) IVPB premix 500 mg  500 mg IntraVENous Q8H    [START ON 6/2/2017] calcitRIOL (ROCALTROL) capsule 0.5 mcg  0.5 mcg Oral DAILY    [START ON 6/2/2017] pantoprazole (PROTONIX) tablet 40 mg  40 mg Oral ACB    lacosamide (VIMPAT) tablet 50 mg  50 mg Oral BID    levETIRAcetam (KEPPRA) tablet 750 mg  750 mg Oral BID    [START ON 6/2/2017] aspirin chewable tablet 81 mg  81 mg Oral DAILY    sevelamer (RENAGEL) tablet 800 mg  800 mg Oral TID WITH MEALS    levoFLOXacin (LEVAQUIN) 250 mg in D5W IVPB  250 mg IntraVENous ONCE    fentaNYL citrate (PF) injection 25 mcg  25 mcg IntraVENous Q4H PRN    [START ON 6/3/2017] levoFLOXacin (LEVAQUIN) 500 mg in D5W IVPB  500 mg IntraVENous Q48H    sodium chloride 0.9 % bolus infusion 500 mL  500 mL IntraVENous ONCE    oxyCODONE-acetaminophen (PERCOCET) 5-325 mg per tablet 1 Tab  1 Tab Oral Q4H PRN       Allergies: Allergies   Allergen Reactions    Shellfish Containing Products Swelling     Break out in rash, trouble breathing    Heparin Analogues Other (comments)     Positive history of HIT    Iodinated Contrast Media - Oral And Iv Dye Other (comments)     Because of renal disease. No rash or hives per patient report 1/14/2012       Review of Systems  A comprehensive review of systems was negative except for that written in the HPI.     Objective:     Exam:    Visit Vitals    /73    Pulse 94    Temp 100 °F (37.8 °C)    Resp 16    Ht 5' 7\" (1.702 m)    Wt 52.5 kg (115 lb 11.9 oz)    SpO2 100%    BMI 18.13 kg/m2     General appearance: alert, cooperative, no distress, appears stated age  Eyes: no sclera icterus  Lungs: clear to auscultation bilaterally  Heart: regular rate and rhythm  Abdomen: soft, non-distended. Multiple well healed scars. Diffuse tenderness but maximal point of tenderness in the LLQ. No rebound or guarding. Extremities: extremities normal, atraumatic, no cyanosis or edema. CHANI. RUE fistula patent with thrill and bruit. No erythema or tenderness. Skin: Skin color, texture, turgor normal. No rashes or lesions. No jaundice. Neurologic: Grossly normal      Data Review  Recent Results (from the past 24 hour(s))   CBC WITH AUTOMATED DIFF    Collection Time: 06/01/17  9:46 AM   Result Value Ref Range    WBC 16.2 (H) 4.1 - 11.1 K/uL    RBC 2.97 (L) 4.10 - 5.70 M/uL    HGB 9.8 (L) 12.1 - 17.0 g/dL    HCT 28.8 (L) 36.6 - 50.3 %    MCV 97.0 80.0 - 99.0 FL    MCH 33.0 26.0 - 34.0 PG    MCHC 34.0 30.0 - 36.5 g/dL    RDW 13.7 11.5 - 14.5 %    PLATELET 016 543 - 445 K/uL    NEUTROPHILS 82 %    LYMPHOCYTES 6 %    MONOCYTES 6 %    EOSINOPHILS 4 %    BASOPHILS 2 %    ABS. NEUTROPHILS 13.3 K/UL    ABS. LYMPHOCYTES 1.0 K/UL    ABS. MONOCYTES 1.0 K/UL    ABS. EOSINOPHILS 0.6 K/UL    ABS. BASOPHILS 0.3 K/UL    RBC COMMENTS NORMOCYTIC, NORMOCHROMIC      DF MANUAL     METABOLIC PANEL, COMPREHENSIVE    Collection Time: 06/01/17  9:46 AM   Result Value Ref Range    Sodium 134 (L) 136 - 145 mmol/L    Potassium 2.7 (LL) 3.5 - 5.1 mmol/L    Chloride 95 (L) 97 - 108 mmol/L    CO2 30 21 - 32 mmol/L    Anion gap 9 5 - 15 mmol/L    Glucose 89 65 - 100 mg/dL    BUN 10 6 - 20 MG/DL    Creatinine 6.32 (H) 0.70 - 1.30 MG/DL    BUN/Creatinine ratio 2 (L) 12 - 20      GFR est AA 12 (L) >60 ml/min/1.73m2    GFR est non-AA 10 (L) >60 ml/min/1.73m2    Calcium 9.6 8.5 - 10.1 MG/DL    Bilirubin, total 0.7 0.2 - 1.0 MG/DL    ALT (SGPT) 13 12 - 78 U/L    AST (SGOT) 14 (L) 15 - 37 U/L    Alk.  phosphatase 209 (H) 45 - 117 U/L    Protein, total 7.8 6.4 - 8.2 g/dL    Albumin 3.7 3.5 - 5.0 g/dL    Globulin 4.1 (H) 2.0 - 4.0 g/dL    A-G Ratio 0.9 (L) 1.1 - 2.2     LACTIC ACID, PLASMA    Collection Time: 06/01/17  9:46 AM   Result Value Ref Range    Lactic acid 2.2 (HH) 0.4 - 2.0 MMOL/L   EKG, 12 LEAD, INITIAL    Collection Time: 06/01/17 12:06 PM   Result Value Ref Range    Ventricular Rate 91 BPM    Atrial Rate 91 BPM    P-R Interval 166 ms    QRS Duration 110 ms    Q-T Interval 412 ms    QTC Calculation (Bezet) 506 ms    Calculated P Axis 57 degrees    Calculated R Axis 49 degrees    Calculated T Axis -81 degrees    Diagnosis       Normal sinus rhythm  Possible Left atrial enlargement  T wave abnormality, consider inferior ischemia  Prolonged QT  Abnormal ECG  When compared with ECG of 12-DEC-2016 08:37,  No significant change was found     LACTIC ACID, PLASMA    Collection Time: 06/01/17  1:59 PM   Result Value Ref Range    Lactic acid 2.5 (HH) 0.4 - 2.0 MMOL/L   PROTHROMBIN TIME + INR    Collection Time: 06/01/17  1:59 PM   Result Value Ref Range    INR 3.4 (H) 0.9 - 1.1      Prothrombin time 36.1 (H) 9.0 - 11.1 sec   LIPASE    Collection Time: 06/01/17  1:59 PM   Result Value Ref Range    Lipase 62 (L) 73 - 393 U/L       Assessment:     Active Problems:    Severe sepsis ([address] (6/1/2017)    Abdominal pain. ?SBP ? cadaveric calcified kidney nidus for infection. No acute abdomen at this time. Fluid collection around the liver unlikely source of infection. It's chronic and fluid appears simple. Plan:     Continue antibiotics.   Serial abdominal exam.  Follow lactate  Check cultures

## 2017-06-01 NOTE — PROGRESS NOTES
Pharmacy Automatic Renal Dosing Protocol - Antimicrobials      Indication for Antimicrobials: Sepsis   Current Regimen of Each Antimicrobial (Start Day & Day of Therapy):  Vancomycin 1250 mg IV load followed by 750 mg IV after dialysis ( Day #1)  Levaquin 750 mg IV q 48 hr- -day #1    Significant cultures:    Blood: pending      CAPD, Intermittent Hemodialysis or Renal Replacement Therapy: HD  Paralysis, amputations, malnutrition: None documented  Recent Labs      17   0946   CREA  6.32*   BUN  10   WBC  16.2*     Temp (24hrs), Av.1 °F (37.8 °C), Min:99.3 °F (37.4 °C), Max:101.1 °F (38.4 °C)    Creatinine Clearance (ml/min): HD      Goal Vancomycin Level(s): 15-20 mcg/mL      Impression/Plan: Will order vancomycin 1250 gm IV load followed by 750 mg IV after dialysis. Patient has had 500 mg Levaquin IV today. Will give additional 250 mg to make total of 750 mg and then Levaquin 500 mg IV q 48 hr.        Pharmacy will follow daily and adjust doses of monitored medications as appropriate for renal function and/or serum levels.     Thank you,  Alejandra Ring, CHIQUITAD

## 2017-06-01 NOTE — IP AVS SNAPSHOT
[address] [facility] 
[phone] Patient: Anup Hills MRN: [id number] :1975 You are allergic to the following Allergen Reactions Shellfish Containing Products Swelling Break out in rash, trouble breathing Heparin Analogues Other (comments) Positive history of HIT Iodinated Contrast Media - Oral And Iv Dye Other (comments) Because of renal disease. No rash or hives per patient report 2012 Recent Documentation Height Weight BMI Smoking Status 1.702 m 62.7 kg 21.65 kg/m2 Never Smoker Unresulted Labs Order Current Status HEP B SURFACE AB In process HEP B SURFACE AG In process CULTURE, BLOOD, PAIRED Preliminary result TYPE & CROSSMATCH Preliminary result Emergency Contacts Name Discharge Info Relation Home Work Mobile Raul Kc  Sister [23] [phone] Filemon(Aunt),Valerie DISCHARGE CAREGIVER [3] Other Relative [6] [phone] About your hospitalization You were admitted on:  2017 You last received care in the:  [facility] You were discharged on:  2017 Unit phone number:  [phone] Why you were hospitalized Your primary diagnosis was:  Severe Sepsis (Hcc) Your diagnoses also included:  Abdominal Pain, Cad (Coronary Artery Disease) Providers Seen During Your Hospitalizations Provider Role Specialty Primary office phone Bayron Coker MD Attending Provider Emergency Medicine [phone] Jojo Correa MD Attending Provider Internal Medicine [phone] Russell Yi MD Attending Provider Internal Medicine [phone] Padmini Foreman MD Attending Provider Internal Medicine [phone] Your Primary Care Physician (PCP) Primary Care Physician Office Phone Office Fax  LEROY JAVIER ** None ** ** None **  
  
 Follow-up Information Follow up With Details Comments Contact Info Lico Salgadot On 6/9/2017 10:00am  hospital follow-up and INR Current Discharge Medication List  
  
START taking these medications Dose & Instructions Dispensing Information Comments Morning Noon Evening Bedtime ceFAZolin in 0.9% NS 1 gram/50ml Soln IVPB Commonly known as:  ANCEF Your last dose was: Your next dose is:    
   
   
 1.5gm IV after hemodialysis, 5 times a week for 9 days. Quantity:  50 mL Refills:  0 Saccharomyces boulardii 250 mg capsule Commonly known as:  Filemon Diaz Your last dose was: Your next dose is:    
   
   
 Dose:  250 mg Take 1 Cap by mouth two (2) times a day for 30 days. Quantity:  60 Cap Refills:  0 CONTINUE these medications which have CHANGED Dose & Instructions Dispensing Information Comments Morning Noon Evening Bedtime * COUMADIN 2.5 mg tablet Generic drug:  warfarin What changed:  Another medication with the same name was changed. Make sure you understand how and when to take each. Your last dose was: Your next dose is:    
   
   
 Dose:  2.5 mg Take 2.5 mg by mouth every Sunday. Refills:  0  
     
   
   
   
  
 * warfarin 5 mg tablet Commonly known as:  COUMADIN What changed:   
- how much to take 
- how to take this - when to take this 
- additional instructions Your last dose was: Your next dose is:    
   
   
 Decrease coumadin dose to 2.5mg while taking these antibiotics. Recheck INR every 2 days until you finish the antibiotics. Quantity:  30 Tab Refills:  0  
     
   
   
   
  
 * Notice: This list has 2 medication(s) that are the same as other medications prescribed for you. Read the directions carefully, and ask your doctor or other care provider to review them with you. CONTINUE these medications which have NOT CHANGED Dose & Instructions Dispensing Information Comments Morning Noon Evening Bedtime  
 aspirin 81 mg chewable tablet Your last dose was: Your next dose is:    
   
   
 Dose:  81 mg Take 81 mg by mouth daily. Refills:  0  
     
   
   
   
  
 calcitRIOL 0.5 mcg capsule Commonly known as:  ROCALTROL Your last dose was: Your next dose is:    
   
   
 Dose:  0.5 mcg Take 0.5 mcg by mouth daily. Refills:  0 KEPPRA 750 mg tablet Generic drug:  levETIRAcetam  
   
Your last dose was: Your next dose is:    
   
   
 Dose:  750 mg Take 750 mg by mouth two (2) times a day. Refills:  0 LIPITOR 20 mg tablet Generic drug:  atorvastatin Your last dose was: Your next dose is:    
   
   
 Dose:  20 mg Take 20 mg by mouth daily. Refills:  0  
     
   
   
   
  
 omeprazole 20 mg capsule Commonly known as:  PRILOSEC Your last dose was: Your next dose is:    
   
   
 Dose:  20 mg Take 20 mg by mouth daily. Refills:  0 PLAVIX 75 mg Tab Generic drug:  clopidogrel Your last dose was: Your next dose is: Take  by mouth daily. Refills:  0  
     
   
   
   
  
 RENVELA 800 mg Tab tab Generic drug:  sevelamer carbonate Your last dose was: Your next dose is:    
   
   
 Dose:  2400 mg Take 2,400 mg by mouth three (3) times daily (with meals). Refills:  0 VIMPAT 50 mg Tab tablet Generic drug:  lacosamide Your last dose was: Your next dose is:    
   
   
 Dose:  50 mg Take 50 mg by mouth two (2) times a day. Refills:  0 Where to Get Your Medications Information on where to get these meds will be given to you by the nurse or doctor. ! Ask your nurse or doctor about these medications ceFAZolin in 0.9% NS 1 gram/50ml Soln IVPB Saccharomyces boulardii 250 mg capsule Discharge Instructions HOSPITALIST DISCHARGE INSTRUCTIONS 
 
NAME: Shoshana Lawrence :  1975 MRN:  [id number] Date/Time:  2017 11:39 AM 
 
ADMIT DATE: 2017 DISCHARGE DATE: 2017 DISCHARGE DIAGNOSIS: 
Severe Sepsis MSSA bacteremia in settings of ESRD on HD at home Abdominal Pain ESRD on HD at home CAD s/p PCI LAD with bare metal stent [address] EF 15% s/p AICD, now EF 30% Hx recurrent left leg DVT S/p left leg bypass  Hx heparin induced thrombocytopenia Hx seizure d/o Hx nephrotic syndrome causing renal failure MEDICATIONS: 
· It is important that you take the medication exactly as they are prescribed. · Keep your medication in the bottles provided by the pharmacist and keep a list of the medication names, dosages, and times to be taken in your wallet. · Do not take other medications without consulting your doctor. Pain Management: per above medications What to do at [facility] Recommended diet:  Resume previous diet Recommended activity: Activity as tolerated If you have questions regarding the hospital related prescriptions or hospital related issues please call Rajeev Carrillo at . If you experience any of the following symptoms then please call your primary care physician or return to the emergency room if you cannot get hold of your doctor: 
Fever, chills, nausea, vomiting, diarrhea, change in mentation, falling, bleeding, shortness of breath Information obtained by : 
I understand that if any problems occur once I am at home I am to contact my physician. I understand and acknowledge receipt of the instructions indicated above. Physician's or R.N.'s Signature                                                                  Date/Time Patient or Representative Signature                                                          Date/Time Discharge Instructions Attachments/References WARFARIN: LONG-TERM USE (ENGLISH) Discharge Orders None [employer] Announcement We are excited to announce that we are making your provider's discharge notes available to you in [employer]. You will see these notes when they are completed and signed by the physician that discharged you from your recent hospital stay. If you have any questions or concerns about any information you see in [employer], please call the Health Information Department where you were seen or reach out to your Primary Care Provider for more information about your plan of care. Introducing [facility]! Dear Shruthi Calderón: Thank you for requesting a [employer] account. Our records indicate that you already have an active [employer] account. You can access your account anytime at [email or web address] Did you know that you can access your hospital and ER discharge instructions at any time in [employer]? You can also review all of your test results from your hospital stay or ER visit. Additional Information If you have questions, please visit the Frequently Asked Questions section of the [employer] website at [email or web address] Remember, [employer] is NOT to be used for urgent needs. For medical emergencies, dial 911. Now available from your iPhone and Android! General Information Please provide this summary of care documentation to your next provider.  
  
  
    
    
 Patient Signature: ____________________________________________________________ Date:  ____________________________________________________________  
  
Tony Mooree Provider Signature:  ____________________________________________________________ Date:  ____________________________________________________________ More Information Taking Warfarin Safely: Care Instructions Your Care Instructions Warfarin is a medicine that you take to prevent blood clots. It is often called a blood thinner. Doctors give warfarin (such as Coumadin) to reduce the risk of blood clots. You may be at risk for blood clots if you have atrial fibrillation or deep vein thrombosis. Some other health problems may also put you at risk. Warfarin slows the amount of time it takes for your blood to clot. It can cause bleeding problems. Even if you've been taking warfarin for a while, it's important to know how to take it safely. Foods and other medicines can affect the way warfarin works. Some can make warfarin work too well. This can cause bleeding problems. And some can make it work poorly, so that it does not prevent blood clots very well. You will need regular blood tests to check how long it takes for your blood to form a clot. This test is called a PT or prothrombin time test. The result of the test is called an INR level. Depending on the test results, your doctor or anticoagulation clinic may adjust your dose of warfarin. Follow-up care is a key part of your treatment and safety. Be sure to make and go to all appointments, and call your doctor if you are having problems. It's also a good idea to know your test results and keep a list of the medicines you take. How can you care for yourself at home? Take warfarin safely · Take your warfarin at the same time each day. · If you miss a dose of warfarin, don't take an extra dose to make up for it.  Your doctor can tell you exactly what to do so you don't take too much or too little. · Wear medical alert jewelry that lets others know that you take warfarin. You can buy this at most drugstores. · Don't take warfarin if you are pregnant or planning to get pregnant. Talk to your doctor about how you can prevent getting pregnant while you are taking it. · Don't change your dose or stop taking warfarin unless your doctor tells you to. Effects of medicines and food on warfarin · Don't start or stop taking any medicines, vitamins, or natural remedies unless you first talk to your doctor. Many medicines can affect how warfarin works. These include aspirin and other pain relievers, over-the-counter medicines, multivitamins, dietary supplements, and herbal products. · Tell all of your doctors and pharmacists that you take warfarin. Some prescription medicines can affect how warfarin works. · Keep the amount of vitamin K in your diet about the same from day to day. Do not suddenly eat a lot more or a lot less food that is rich in vitamin K than you usually do. Vitamin K affects how warfarin works and how your blood clots. Talk with your doctor before making big changes in your diet. Foods that have a lot of vitamin K include cooked green vegetables, such as: ¨ Kale, spinach, turnip greens, mariya greens, Swiss chard, and mustard greens. ¨ [place] sprouts, broccoli, and asparagus. · Limit your use of alcohol. Avoid bleeding by preventing falls and injuries · Wear slippers or shoes with nonskid soles. · Remove throw rugs and clutter. · Rearrange furniture and electrical cords to keep them out of walking paths. · Keep stairways, porches, and outside walkways well lit. Use night-lights in hallways and bathrooms. · Be extra careful when you work with sharp tools or knives. When should you call for help? Call 911 anytime you think you may need emergency care. For example, call if: 
· You have a sudden, severe headache that is different from past headaches. Call your doctor now or seek immediate medical care if: 
· You have any abnormal bleeding, such as: 
¨ Nosebleeds. ¨ Vaginal bleeding that is different (heavier, more frequent, at a different time of the month) than what you are used to. ¨ Bloody or black stools, or rectal bleeding. ¨ Bloody or pink urine. Watch closely for changes in your health, and be sure to contact your doctor if you have any problems. Where can you learn more? Go to http://anabel-mor.info/. Enter P211 in the search box to learn more about \"Taking Warfarin Safely: Care Instructions. \" Current as of: November 15, 2016 Content Version: 11.2 © 0956-6016 [employer]. Care instructions adapted under license by [employer] (which disclaims liability or warranty for this information). If you have questions about a medical condition or this instruction, always ask your healthcare professional. [address] any warranty or liability for your use of this information.

## 2017-06-01 NOTE — ED PROVIDER NOTES
HPI Comments: Tyrell Bryan is a 46 y.o. male with PMhx significant for HTN, HLD, CAD (MI), AICD, peritonitis, DVT, necrotic bowel, V-tach, SBO, GERD, PUD, ESRD (hemodialysis Tues/Thurs/Sat, anuric) who presents ambulatory to the ED with cc of acute onset nausea, vomiting, left lower abdominal pain, and generalized body aches this morning while undergoing dialysis. Pt states that he took his temperature this morning prior to starting dialysis, stating it was 99.1F; he notes he had taken OTC Tylenol this morning at 0430. He states that while undergoing dialysis, he began to experience symptoms. Upon rechecking his temperature, it came back elevated at 100.9F. He denies any known sick contacts and notes he felt well yesterday without any symptoms. He denies any hx of sepsis requiring admission in the past. He reports a hx of necrotic bowel and is s/p bowel resection, however, he states his abdominal pain at this time is not similar to prior episodes. He denies any rhinorrhea, cough, sore throat, diarrhea, eye discharge, chills, rash, or congestion. PCP: Og Herron MD    There are no other complaints, changes or physical findings at this time. The history is provided by the patient. No  was used. Past Medical History:   Diagnosis Date    Abdominal hematoma     AICD (automatic cardioverter/defibrillator) present     CAD (coronary artery disease)     anterior MI s/p 2 stents  2007    Chronic abdominal pain     Chronic kidney disease     ESRD; Dialysis dependent.      DVT (deep venous thrombosis) ([facility] 2001    DVT of popliteal vein ([address] 11/2011    not anticoagulated due to bleeding, IVC filter    Gallstone pancreatitis     Gastrointestinal disorder     acid reflux    Gastrointestinal disorder     peptic ulcer    Hemodialysis patient ([facility] [address]     High cholesterol     Hypertension     Kidney transplant     b/l kidneys 1995, 2001    Nephrotic syndrome     Other ill-defined conditions(799.89)     kidny transplant x2,  on dialysis    Other ill-defined conditions(799.89)     home dialysis    Other ill-defined conditions(799.89)     hx recurrent left leg DVT    Peritonitis ([address]     Seizures ([address]     Small bowel obstruction ([address]     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    V-tach [facility])        Past Surgical History:   Procedure Laterality Date    HX APPENDECTOMY      HX CHOLECYSTECTOMY      HX OTHER SURGICAL      cholecystectomy    HX OTHER SURGICAL  11/2011    exploratory laparotomy    HX OTHER SURGICAL      fem-pop    HX OTHER SURGICAL  02/2013    right upper extremity fistula    HX PACEMAKER  6/2009    AICD    HX SMALL BOWEL RESECTION      HX TRANSPLANT  2001     Kidney    HX UROLOGICAL      2 kidney transplants    AK EDG US EXAM SURGICAL ALTER STOM DUODENUM/JEJUNUM  7/15/2011         AK ESOPHAGOGASTRODUODENOSCOPY TRANSORAL DIAGNOSTIC  5/24/2012              Family History:   Problem Relation Age of Onset    COPD Mother     Lung Disease Mother     Cancer Father      stomach    Cancer Maternal Grandmother        Social History     Social History    Marital status: SINGLE     Spouse name: N/A    Number of children: N/A    Years of education: N/A     Occupational History    Not on file. Social History Main Topics    Smoking status: Never Smoker    Smokeless tobacco: Never Used    Alcohol use No    Drug use: No    Sexual activity: Not on file     Other Topics Concern    Not on file     Social History Narrative         ALLERGIES: Shellfish containing products; Heparin analogues; and Iodinated contrast media - oral and iv dye    Review of Systems   Constitutional: Positive for fever. Negative for chills. HENT: Negative for congestion, rhinorrhea and sore throat. Eyes: Negative for discharge. Respiratory: Negative for cough and shortness of breath. Cardiovascular: Negative for chest pain.    Gastrointestinal: Positive for abdominal pain, nausea and vomiting. Negative for constipation and diarrhea. Musculoskeletal: Positive for myalgias (generalized body aches). Skin: Negative for rash. Neurological: Negative for weakness and numbness. All other systems reviewed and are negative. Vitals:    06/01/17 0908 06/01/17 0929 06/01/17 1100   BP: 127/80 111/74 115/79   Pulse: 98 90 99   Resp: 16 25 18   Temp: (!) 101.1 °F (38.4 °C)  99.3 °F (37.4 °C)   SpO2: 100%  100%   Weight: 52.5 kg (115 lb 11.9 oz)     Height: 5' 7\" (1.702 m)              Physical Exam   Constitutional: He is oriented to person, place, and time. He appears well-developed and well-nourished. HENT:   Head: Normocephalic and atraumatic. Eyes: Conjunctivae and EOM are normal.   Neck: Normal range of motion. Neck supple. Cardiovascular: Regular rhythm. Tachycardia present. AV shunt to RUE. Pulmonary/Chest: Effort normal and breath sounds normal. No respiratory distress. Abdominal: Soft. He exhibits no distension. There is tenderness in the left lower quadrant. Musculoskeletal: Normal range of motion. Neurological: He is alert and oriented to person, place, and time. Skin: Skin is dry. Warm to touch. Psychiatric: He has a normal mood and affect. Nursing note and vitals reviewed. MDM  Number of Diagnoses or Management Options  ESRD (end stage renal disease) on dialysis [facility]):   Severe sepsis [facility]):   Diagnosis management comments: Patient presents with fever, tachycardia and concerns for infection. Most likely intraabdominal pathology vs. Ischemic colitis/necrotic bowel since fever with bellypain. DDx: sepsis 2/2 PNA, intraabdominal infection (colitis, appendicitis, cholecystitis), meningitis, soft tissue infection, septic arthritis, flu/viral prodrome. Will follow sepsis protocol and order antibiotics, labs, lactate, EKG and frequently reassessing hemodynamic status on the patient.   Will forgo administration of 30 cc/kg IVF bolus given pt's hx of ESRD and dialysis dependence. Amount and/or Complexity of Data Reviewed  Clinical lab tests: ordered and reviewed  Tests in the radiology section of CPT®: ordered and reviewed  Tests in the medicine section of CPT®: ordered and reviewed  Review and summarize past medical records: yes  Discuss the patient with other providers: yes (Hospitalist)  Independent visualization of images, tracings, or specimens: yes    Patient Progress  Patient progress: stable    ED Course       Procedures    9:40 AM  Discussion on IVF Resuscitation: This patient has been identified as at risk for severe sepsis based on their elevated lactate level. Based on this patient's current presentation and significant PMHx (ESRD, on dialysis) it is likely that aggressive fluid resuscitation efforts (e.g. 30cc/kg IVF bolus) would be detrimental to their health. I have discussed my concerns,  the risks and possible benefits of this recommended therapy with the patient and/or family who agree that the risks are too great and REFUSES further aggressive fluid resuscitation at this time. The patient will continue to be monitored closely and frequently re-evaluated for any potential changes that may be needed in their plan of care. Procedure Note- Peripheral IV Access  9:44 AM  Performed by: MD Clara Zeng MD gained IV access using  20 gauge needle because the patient had no vascular access. After cleaning the site with alcohol prep, the Left brachial vein was localized with ultrasound guidance in an anterior approach. Line confirmation was obtained by direct visualization and good blood return. No anaesthetic was used. The line was successfully flushed with normal saline and was secured with transparent tape. Estimated blood loss: Less than 1 cc  The procedure took 1-15 minutes, and pt tolerated well. PROGRESS NOTE:  10:31 AM  Pt's lactate 2.2.  Will provide only 250 ccs IVF bolus given hx of ESRD and dialysis. CONSULT NOTE:   11:53 AM  Juanito Sam MD spoke with Dr. Kadi Yeboah,   Specialty: Hospitalist  Discussed pt's hx, disposition, and available diagnostic and imaging results. Reviewed care plans. Consultant will evaluate pt for admission. EKG interpretation: (Preliminary) 1206  Rhythm: normal sinus rhythm; and regular . Rate (approx.): 91; Axis: normal; NC interval: normal; QRS interval: normal ; ST/T wave: normal; Other findings: No U waves. Written by KAYLEY Sanchez, as dictated by Juanito Sam MD.    LABORATORY TESTS:  Recent Results (from the past 12 hour(s))   CBC WITH AUTOMATED DIFF    Collection Time: 06/01/17  9:46 AM   Result Value Ref Range    WBC 16.2 (H) 4.1 - 11.1 K/uL    RBC 2.97 (L) 4.10 - 5.70 M/uL    HGB 9.8 (L) 12.1 - 17.0 g/dL    HCT 28.8 (L) 36.6 - 50.3 %    MCV 97.0 80.0 - 99.0 FL    MCH 33.0 26.0 - 34.0 PG    MCHC 34.0 30.0 - 36.5 g/dL    RDW 13.7 11.5 - 14.5 %    PLATELET 671 110 - 450 K/uL    NEUTROPHILS 82 %    LYMPHOCYTES 6 %    MONOCYTES 6 %    EOSINOPHILS 4 %    BASOPHILS 2 %    ABS. NEUTROPHILS 13.3 K/UL    ABS. LYMPHOCYTES 1.0 K/UL    ABS. MONOCYTES 1.0 K/UL    ABS. EOSINOPHILS 0.6 K/UL    ABS. BASOPHILS 0.3 K/UL    RBC COMMENTS NORMOCYTIC, NORMOCHROMIC      DF MANUAL     METABOLIC PANEL, COMPREHENSIVE    Collection Time: 06/01/17  9:46 AM   Result Value Ref Range    Sodium 134 (L) 136 - 145 mmol/L    Potassium 2.7 (LL) 3.5 - 5.1 mmol/L    Chloride 95 (L) 97 - 108 mmol/L    CO2 30 21 - 32 mmol/L    Anion gap 9 5 - 15 mmol/L    Glucose 89 65 - 100 mg/dL    BUN 10 6 - 20 MG/DL    Creatinine 6.32 (H) 0.70 - 1.30 MG/DL    BUN/Creatinine ratio 2 (L) 12 - 20      GFR est AA 12 (L) >60 ml/min/1.73m2    GFR est non-AA 10 (L) >60 ml/min/1.73m2    Calcium 9.6 8.5 - 10.1 MG/DL    Bilirubin, total 0.7 0.2 - 1.0 MG/DL    ALT (SGPT) 13 12 - 78 U/L    AST (SGOT) 14 (L) 15 - 37 U/L    Alk.  phosphatase 209 (H) 45 - 117 U/L Protein, total 7.8 6.4 - 8.2 g/dL    Albumin 3.7 3.5 - 5.0 g/dL    Globulin 4.1 (H) 2.0 - 4.0 g/dL    A-G Ratio 0.9 (L) 1.1 - 2.2     LACTIC ACID, PLASMA    Collection Time: 06/01/17  9:46 AM   Result Value Ref Range    Lactic acid 2.2 (HH) 0.4 - 2.0 MMOL/L       IMAGING RESULTS:  CT Results  (Last 48 hours)               06/01/17 1049  CT ABD PELV W CONT Final result    Impression:  IMPRESSION: No bowel obstruction, ileus or perforation. Narrative:  INDICATION: Left lower quadrant pain, history of necrotic bowel. Fever, nausea,   vomiting this morning after hemodialysis. CT of the abdomen and pelvis is performed with 5 mm collimation. Study is   performed with PO and 100 cc of nonionic Isovue 370. Sagittal and coronal   reformatted images were also performed. CT dose reduction was achieved with the use of the standardized protocol   tailored for this examination and automatic exposure control for dose   modulation. Direct comparison is made to prior CT dated February 2017 and prior CT CT dated   September 2014. Findings:       Lung bases: The visualized lung bases are clear. Liver: The liver is stable in appearance. There is a small thin-walled   collection of fluid posterior right hepatic lobe, measuring 9 mm x 3.5 cm and   measured 1.4 cm x 4.4 cm on prior CT dated February 2017. Adrenals: Adrenal glands are normal.       Pancreas: The pancreas is normal.       Gallbladder: The gallbladder is surgically absent. Kidneys: The kidneys are markedly atrophic and several renal cysts are noted. Within the left hemipelvis, there is a partially calcified atrophic kidney,   unchanged. There is a crescentic thin walled fluid collection medial to the left   hepatic lobe, decreased in size measuring approximately 2.5 cm x 11 cm and   measuring approximately 3.4 cm x 13 cm on prior CT dated February 2017. Spleen: The spleen is stable. Lymph nodes.  There is a 2.9 cm x 2.8 cm rounded partially calcified mesenteric   soft tissue lesion which is unchanged in size and morphology compared to prior   CT dated February 2017. There is no jennifer hepatitis, retroperitoneal or pelvic   lymphadenopathy. Subcentimeter retroperitoneal lymph nodes are unchanged. Bowel: No thickened or dilated loop of large or small bowel is visualized. Urinary bladder: Urinary bladder is partially filled and grossly normal.       Miscellaneous: There is no free intraperitoneal gas. CXR Results  (Last 48 hours)               06/01/17 1001  XR CHEST PA LAT Final result    Impression:  IMPRESSION: No acute cardiopulmonary disease. Narrative: Indication:  sep[sis        Exam: PA and lateral views of the chest.       Direct comparison is made to prior CXR dated December 2016. Findings: Cardiomediastinal silhouette is stable. Intact pacer lead extends to   the right ventricle. Lungs are clear bilaterally. Pleural spaces are normal.   Osseous structures are intact. MEDICATIONS GIVEN:  Medications   metroNIDAZOLE (FLAGYL) IVPB premix 500 mg (500 mg IntraVENous New Bag 6/1/17 1134)   sodium chloride 0.9 % bolus infusion 250 mL (not administered)   potassium chloride 10 mEq in 100 ml IVPB (not administered)   ondansetron (ZOFRAN) injection 4 mg (4 mg IntraVENous Given 6/1/17 1012)   levoFLOXacin (LEVAQUIN) 500 mg in D5W IVPB (0 mg IntraVENous IV Completed 6/1/17 1132)   0.9% sodium chloride infusion (50 mL/hr IntraVENous New Bag 6/1/17 1050)   iopamidol (ISOVUE-370) 76 % injection 100 mL (100 mL IntraVENous Given 6/1/17 1050)   sodium chloride (NS) flush 10 mL (10 mL IntraVENous Given 6/1/17 1050)   potassium chloride SR (KLOR-CON 10) tablet 40 mEq (40 mEq Oral Given 6/1/17 1101)   fentaNYL citrate (PF) injection 50 mcg (50 mcg IntraVENous Given 6/1/17 1101)       IMPRESSION:  1. Severe sepsis ([address]    2.  ESRD (end stage renal disease) on dialysis ([address] PLAN:  1. Admit to Hospitalist    Admit Note:  11:54 AM  Patient is being admitted to the hospital by Dr. Ivan Florence. The results of their tests and reasons for their admission have been discussed with them and/or available family. They convey agreement and understanding for the need to be admitted and for their admission diagnosis. Consultation has been made with the inpatient physician specialist for hospitalization. Attestation: This note is prepared by Coral Schilder, acting as Scribe for Cheng Hwang MD.    Cheng Hwang MD: The scribe's documentation has been prepared under my direction and personally reviewed by me in its entirety. I confirm that the note above accurately reflects all work, treatment, procedures, and medical decision making performed by me.

## 2017-06-01 NOTE — ED NOTES
Patient reports that he is a home dialysis patient and spiked a fever and began having body aches, early this morning. Started dialysis at [phone] and stopped at 0650. Around 0430, patients fever spiked to 101.9.

## 2017-06-02 LAB
ALBUMIN SERPL BCP-MCNC: 2.9 G/DL (ref 3.5–5)
ALBUMIN/GLOB SERPL: 0.8 {RATIO} (ref 1.1–2.2)
ALP SERPL-CCNC: 164 U/L (ref 45–117)
ALT SERPL-CCNC: 13 U/L (ref 12–78)
ANION GAP BLD CALC-SCNC: 9 MMOL/L (ref 5–15)
AST SERPL W P-5'-P-CCNC: 15 U/L (ref 15–37)
ATRIAL RATE: 91 BPM
BASOPHILS # BLD AUTO: 0 K/UL (ref 0–0.1)
BASOPHILS # BLD: 0 % (ref 0–1)
BILIRUB SERPL-MCNC: 0.6 MG/DL (ref 0.2–1)
BUN SERPL-MCNC: 24 MG/DL (ref 6–20)
BUN/CREAT SERPL: 3 (ref 12–20)
CALCIUM SERPL-MCNC: 8.6 MG/DL (ref 8.5–10.1)
CALCULATED P AXIS, ECG09: 57 DEGREES
CALCULATED R AXIS, ECG10: 49 DEGREES
CALCULATED T AXIS, ECG11: -81 DEGREES
CHLORIDE SERPL-SCNC: 95 MMOL/L (ref 97–108)
CO2 SERPL-SCNC: 27 MMOL/L (ref 21–32)
CREAT SERPL-MCNC: 8.07 MG/DL (ref 0.7–1.3)
DIAGNOSIS, 93000: NORMAL
EOSINOPHIL # BLD: 0.1 K/UL (ref 0–0.4)
EOSINOPHIL NFR BLD: 1 % (ref 0–7)
ERYTHROCYTE [DISTWIDTH] IN BLOOD BY AUTOMATED COUNT: 13.9 % (ref 11.5–14.5)
GLOBULIN SER CALC-MCNC: 3.8 G/DL (ref 2–4)
GLUCOSE SERPL-MCNC: 80 MG/DL (ref 65–100)
HCT VFR BLD AUTO: 23.9 % (ref 36.6–50.3)
HGB BLD-MCNC: 8 G/DL (ref 12.1–17)
LACTATE SERPL-SCNC: 1.5 MMOL/L (ref 0.4–2)
LYMPHOCYTES # BLD AUTO: 2 % (ref 12–49)
LYMPHOCYTES # BLD: 0.3 K/UL (ref 0.8–3.5)
MAGNESIUM SERPL-MCNC: 2 MG/DL (ref 1.6–2.4)
MCH RBC QN AUTO: 32.4 PG (ref 26–34)
MCHC RBC AUTO-ENTMCNC: 33.5 G/DL (ref 30–36.5)
MCV RBC AUTO: 96.8 FL (ref 80–99)
MONOCYTES # BLD: 0.7 K/UL (ref 0–1)
MONOCYTES NFR BLD AUTO: 6 % (ref 5–13)
NEUTS SEG # BLD: 10.3 K/UL (ref 1.8–8)
NEUTS SEG NFR BLD AUTO: 91 % (ref 32–75)
P-R INTERVAL, ECG05: 166 MS
PHOSPHATE SERPL-MCNC: 1.8 MG/DL (ref 2.6–4.7)
PLATELET # BLD AUTO: 128 K/UL (ref 150–400)
POTASSIUM SERPL-SCNC: 3.7 MMOL/L (ref 3.5–5.1)
PROT SERPL-MCNC: 6.7 G/DL (ref 6.4–8.2)
Q-T INTERVAL, ECG07: 412 MS
QRS DURATION, ECG06: 110 MS
QTC CALCULATION (BEZET), ECG08: 506 MS
RBC # BLD AUTO: 2.47 M/UL (ref 4.1–5.7)
SODIUM SERPL-SCNC: 131 MMOL/L (ref 136–145)
VENTRICULAR RATE, ECG03: 91 BPM
WBC # BLD AUTO: 11.4 K/UL (ref 4.1–11.1)

## 2017-06-02 PROCEDURE — 83605 ASSAY OF LACTIC ACID: CPT | Performed by: HOSPITALIST

## 2017-06-02 PROCEDURE — 36415 COLL VENOUS BLD VENIPUNCTURE: CPT | Performed by: HOSPITALIST

## 2017-06-02 PROCEDURE — 74011000250 HC RX REV CODE- 250: Performed by: HOSPITALIST

## 2017-06-02 PROCEDURE — 84100 ASSAY OF PHOSPHORUS: CPT | Performed by: HOSPITALIST

## 2017-06-02 PROCEDURE — 74011250636 HC RX REV CODE- 250/636: Performed by: INTERNAL MEDICINE

## 2017-06-02 PROCEDURE — 83735 ASSAY OF MAGNESIUM: CPT | Performed by: HOSPITALIST

## 2017-06-02 PROCEDURE — 74011250636 HC RX REV CODE- 250/636: Performed by: HOSPITALIST

## 2017-06-02 PROCEDURE — 74011250637 HC RX REV CODE- 250/637: Performed by: HOSPITALIST

## 2017-06-02 PROCEDURE — 85025 COMPLETE CBC W/AUTO DIFF WBC: CPT | Performed by: HOSPITALIST

## 2017-06-02 PROCEDURE — 80053 COMPREHEN METABOLIC PANEL: CPT | Performed by: HOSPITALIST

## 2017-06-02 PROCEDURE — 65660000000 HC RM CCU STEPDOWN

## 2017-06-02 RX ADMIN — OXYCODONE HYDROCHLORIDE AND ACETAMINOPHEN 1 TABLET: 5; 325 TABLET ORAL at 00:42

## 2017-06-02 RX ADMIN — METRONIDAZOLE 500 MG: 500 INJECTION, SOLUTION INTRAVENOUS at 14:10

## 2017-06-02 RX ADMIN — FENTANYL CITRATE 25 MCG: 50 INJECTION, SOLUTION INTRAMUSCULAR; INTRAVENOUS at 06:56

## 2017-06-02 RX ADMIN — METRONIDAZOLE 500 MG: 500 INJECTION, SOLUTION INTRAVENOUS at 08:17

## 2017-06-02 RX ADMIN — FENTANYL CITRATE 25 MCG: 50 INJECTION, SOLUTION INTRAMUSCULAR; INTRAVENOUS at 19:26

## 2017-06-02 RX ADMIN — LACOSAMIDE 50 MG: 100 TABLET, FILM COATED ORAL at 08:20

## 2017-06-02 RX ADMIN — PROCHLORPERAZINE EDISYLATE 5 MG: 5 INJECTION INTRAMUSCULAR; INTRAVENOUS at 15:55

## 2017-06-02 RX ADMIN — CALCITRIOL 0.5 MCG: 0.25 CAPSULE, LIQUID FILLED ORAL at 08:19

## 2017-06-02 RX ADMIN — LACOSAMIDE 50 MG: 100 TABLET, FILM COATED ORAL at 17:57

## 2017-06-02 RX ADMIN — Medication 10 ML: at 19:26

## 2017-06-02 RX ADMIN — LEVETIRACETAM 750 MG: 250 TABLET, FILM COATED ORAL at 17:56

## 2017-06-02 RX ADMIN — FENTANYL CITRATE 25 MCG: 50 INJECTION, SOLUTION INTRAMUSCULAR; INTRAVENOUS at 23:42

## 2017-06-02 RX ADMIN — FENTANYL CITRATE 25 MCG: 50 INJECTION, SOLUTION INTRAMUSCULAR; INTRAVENOUS at 02:51

## 2017-06-02 RX ADMIN — FENTANYL CITRATE 25 MCG: 50 INJECTION, SOLUTION INTRAMUSCULAR; INTRAVENOUS at 15:46

## 2017-06-02 RX ADMIN — Medication 10 ML: at 02:58

## 2017-06-02 RX ADMIN — Medication 10 ML: at 23:44

## 2017-06-02 RX ADMIN — METRONIDAZOLE 500 MG: 500 INJECTION, SOLUTION INTRAVENOUS at 22:51

## 2017-06-02 RX ADMIN — IRON SUCROSE 200 MG: 20 INJECTION, SOLUTION INTRAVENOUS at 09:33

## 2017-06-02 RX ADMIN — ASPIRIN 81 MG CHEWABLE TABLET 81 MG: 81 TABLET CHEWABLE at 08:19

## 2017-06-02 RX ADMIN — LEVETIRACETAM 750 MG: 250 TABLET, FILM COATED ORAL at 08:19

## 2017-06-02 RX ADMIN — ERYTHROPOIETIN 40000 UNITS: 40000 INJECTION, SOLUTION INTRAVENOUS; SUBCUTANEOUS at 19:32

## 2017-06-02 RX ADMIN — Medication 10 ML: at 06:58

## 2017-06-02 RX ADMIN — FENTANYL CITRATE 25 MCG: 50 INJECTION, SOLUTION INTRAMUSCULAR; INTRAVENOUS at 11:09

## 2017-06-02 RX ADMIN — PANTOPRAZOLE SODIUM 40 MG: 40 TABLET, DELAYED RELEASE ORAL at 08:19

## 2017-06-02 RX ADMIN — Medication 10 ML: at 14:11

## 2017-06-02 RX ADMIN — OXYCODONE HYDROCHLORIDE AND ACETAMINOPHEN 1 TABLET: 5; 325 TABLET ORAL at 09:33

## 2017-06-02 NOTE — CDMP QUERY
Dr. Alayna De La Fuente :    Please clarify if this patient is being treated/managed for:    =>Hypokalemia POA in setting of admit K: 2.7 requiring KCl PO and IV with resultant K: 3.7  =>Other Explanation of clinical findings  =>Unable to Determine (no explanation of clinical findings)    The medical record reflects the following clinical findings, treatment, and risk factors:    Risk Factors: 41 BM w/hx: abd hematoma, CAD, ESRD, DVT, gallstone pancreatitis, high cholesterol, HTN, nephrotic syndrome, seizures, SBO, HIT, v-tach  Clinical Indicators: Admitted with severe sepsis with admit K: 2.7   Treatment: KCl PO and IV with resultant K: 3.7    Please clarify and document your clinical opinion in the progress notes and discharge summary including the definitive and/or presumptive diagnosis, (suspected or probable), related to the above clinical findings. Please include clinical findings supporting your diagnosis. Thank [email or web address]  [phone]

## 2017-06-02 NOTE — PROGRESS NOTES
2015: patients temperature is 102.9, patient states he has the chills and does not feel well. No tylenol on his MAR, will page MD on call. 2030: Dr. Ruth Gutiérrez called back and made aware of patients temperature and ordered tylenol for patient every 4 hours prn. 2034: tylenol given to patient. 2230: temperature is 99.9. Patient states he feels cooler and better then he did feel.

## 2017-06-02 NOTE — PROGRESS NOTES
SURGERY PROGRESS NOTE      Admit Date: 2017    Subjective:     Feeling better this morning. Fever better. Abdominal pain improving. +GPC on blood culture    Objective:     Visit Vitals    /71 (BP 1 Location: Left arm, BP Patient Position: At rest)    Pulse 92    Temp 98.6 °F (37 °C)    Resp 18    Ht 5' 7\" (1.702 m)    Wt 63 kg (138 lb 12.8 oz)    SpO2 98%    BMI 21.74 kg/m2        Temp (24hrs), Av.9 °F (37.7 °C), Min:98.6 °F (37 °C), Max:102.9 °F (39.4 °C)      Physical Exam:     Abdomen:  Soft. Non-distended. Mild LLQ tenderness, no rebound or guarding. Lab Results  Component Value Date/Time   WBC 11.4 2017 03:35 AM   Hemoglobin (POC) 14.3 2014 02:38 PM   HGB 8.0 2017 03:35 AM   Hematocrit (POC) 42 2014 02:38 PM   HCT 23.9 2017 03:35 AM   PLATELET 993  03:35 AM   MCV 96.8 2017 03:35 AM       Lab Results  Component Value Date/Time   GFR est AA 9 2017 03:35 AM   GFR est non-AA 7 2017 03:35 AM   Creatinine (POC) 6.3 2014 02:38 PM   Creatinine 8.07 2017 03:35 AM   BUN 24 2017 03:35 AM   BUN (POC) 22 2014 02:38 PM   Sodium (POC) 135 2014 02:38 PM   Sodium 131 2017 03:35 AM   Potassium 3.7 2017 03:35 AM   Potassium (POC) 5.4 2014 02:38 PM   Chloride (POC) 98 2014 02:38 PM   Chloride 95 2017 03:35 AM   CO2 27 2017 03:35 AM        Assessment:     Principal Problem:    Severe sepsis ([facility] (2017)    Active Problems:    Abdominal pain (2011)      CAD (coronary artery disease) ()      ESRD (end stage renal disease) [facility] (2013)        Plan/Recommendations/Medical Decision Making:     Advance diet. Continue current treatment.

## 2017-06-02 NOTE — ROUTINE PROCESS
Bedside and Verbal shift change report given to Ellie Álvarez RN (oncoming nurse) by Laura Rice RN (offgoing nurse).  Report included the following information SBAR, Kardex and Recent Results.     Zone Phone: [phone]        Significant changes during shift: Pain relief with Fentanyl           Patient Information     Myron Fernandez  46 y.o.  6/1/2017 9:10 AM by Amaya Chacon MD. Myron Fernandez was admitted from Home     Problem List          Patient Active Problem List     Diagnosis Date Noted    Severe sepsis ([address] 06/01/2017    Acute encephalopathy 09/26/2014    Hypoglycemia 09/16/2014    Congestive heart failure, unspecified 09/11/2014    Encephalopathy acute 09/07/2014    Pulmonary edema 09/07/2014    Hyperkalemia 10/06/2013    ESRD (end stage renal disease) on dialysis ([address] 05/14/2013    Dialysis patient ([address] 05/14/2013    Abdominal pain, other specified site 05/14/2013    S/p cadaver renal transplant 05/23/2012    AICD (automatic cardioverter/defibrillator) present 05/23/2012    Arterial occlusion ([address] 02/19/2012    Paronychia of great toe, left 01/19/2012    Acute renal failure ([address] 01/13/2012    Abscess of abdominal cavity ([address] 12/18/2011    Peritonitis ([address] 12/06/2011    Mesenteric hematoma 12/06/2011    Malnutrition ([address] 12/06/2011    DVT (deep venous thrombosis) ([address] 12/06/2011    S/P IVC filter 12/06/2011    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[address] 11/25/2011    HTN (hypertension) 11/06/2011    Anemia 11/06/2011    CHRISTINE (acute kidney injury) ([address] 11/06/2011    S/P appendectomy 11/06/2011    SBO (small bowel obstruction) ([address] 11/06/2011    High cholesterol      Other ill-defined conditions      CAD (coronary artery disease)      Gastrointestinal disorder      Abdominal pain 11/02/2011    Dehydration 07/13/2011    Nausea & vomiting 07/13/2011    Abdominal pain, acute, epigastric 07/13/2011    Serum total bilirubin elevated 07/13/2011    Acute pancreatitis 07/12/2011    Kidney transplant 07/12/2011    Coronary atherosclerosis of native coronary artery 07/12/2011           Past Medical History:   Diagnosis Date    Abdominal hematoma      AICD (automatic cardioverter/defibrillator) present      CAD (coronary artery disease)       anterior MI s/p 2 stents 2007    Chronic abdominal pain      Chronic kidney disease       ESRD; Dialysis dependent.  DVT (deep venous thrombosis) ([facility] [address] 2001    DVT of popliteal vein ([facility] [address] 11/2011     not anticoagulated due to bleeding, IVC filter    Gallstone pancreatitis      Gastrointestinal disorder       acid reflux    Gastrointestinal disorder       peptic ulcer    Hemodialysis patient (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 [address]              Core Measures:     CVA: No No  CHF:No No  PNA:No No     Activity Status:     OOB to Chair No  Ambulated this shift No   Bed Rest Yes     Supplemental O2: (If Applicable)     NC No  NRB No  Venti-mask No  On 0  Liters/min        LINES AND DRAINS:     PIV only     AV fistula right arm     Patient Safety:     Falls Score Total Score: 1  Safety Level_______  Bed Alarm On? No  Sitter?  No     Plan for upcoming shift: abx           Discharge Plan: No      Active Consults:  IP CONSULT TO GENERAL SURGERY  IP CONSULT TO NEPHROLOGY

## 2017-06-02 NOTE — PROGRESS NOTES
Hospitalist Progress Note    NAME: Pelon Cote   :  1975   MRN:  [id number]       Interim Hospital Summary: 46 y.o. male whom presented on 2017 with      Assessment / Plan:    Severe Sepsis, POA with no clear source  Gram positive bacteremia  Intra-abdominal fluid collections, POA , fistula site looks ok  Hx necrotic bowel s/ surgery   Hx gallstone pancreatitis s/p aashish   Hx frequent abdominal pain without organic etiology      --empiric abx with vancomycin, levaquin, flagyl. Check repeat cultures in am  Check TTE, may need to check duplex of HD access  Seen by surgery-- no need for intervention, advance the diet  Need to figure out the source of bacteremia    Home HD        ESRD on HD at home  --nephrology following  hypokalemia treated     CAD s/p PCI LAD with bare metal stent  HOSP DEL MAESTRO EF 15% s/p AICD  --continue aspirin. Hold plavix in case need any invasive intervention pending surgical consult     Hx recurrent left leg DVT  S/p left leg bypass   --on coumadin. Check INR. Would hold coumadin pending surgical evaluation. If INR subtherapeutic, would put on argatroban drip. No heparin since hx HIT     Hx heparin induced thrombocytopenia  --no heparin products     Hx seizure d/o  --continue keppra and vimpat     Hx nephrotic syndrome causing renal failure  Hx failed cadaveric renal transplants x 2     Code: full  DVT prophylaxis: coumadin, (holding today)  Surrogate decision maker: sister      Body mass index is 21.74 kg/(m^2). Subjective:     Chief Complaint / Reason for Physician Visit  Follow up on    Discussed with RN events overnight.      Review of Systems:  Symptom Y/N Comments  Symptom Y/N Comments   Fever/Chills y   Chest Pain n    Poor Appetite    Edema     Cough n   Abdominal Pain y    Sputum    Joint Pain     SOB/ZAMUDIO    Pruritis/Rash     Nausea/vomit    Tolerating PT/OT     Diarrhea    Tolerating Diet     Constipation    Other       Could NOT obtain due to: Objective:     VITALS:   Last 24hrs VS reviewed since prior progress note. Most recent are:  Patient Vitals for the past 24 hrs:   Temp Pulse Resp BP SpO2   06/02/17 1120 98.6 °F (37 °C) 92 18 110/71 98 %   06/02/17 0735 99.4 °F (37.4 °C) 83 18 104/68 98 %   06/02/17 0654 99 °F (37.2 °C) 83 18 107/69 98 %   06/02/17 0443 99.8 °F (37.7 °C) 91 18 96/62 97 %   06/02/17 0029 99.6 °F (37.6 °C) 91 18 115/72 100 %   06/01/17 2015 (!) 102.9 °F (39.4 °C) (!) 103 16 108/62 99 %   06/01/17 1457 100 °F (37.8 °C) 94 16 115/73 100 %     No intake or output data in the 24 hours ending 06/02/17 1315     PHYSICAL EXAM:  General: WD, WN. Alert, cooperative, no acute distress    EENT:  EOMI. Anicteric sclerae. MMM  Resp:  B/l air entry. No accessory muscle use  CV:  Regular  rhythm,  No edema  GI:  Soft, Non distended, tenderness in the left lower quadrant.  +Bowel sounds  Neurologic:  Alert and oriented X 3, normal speech,   Psych:   Good insight. Not anxious nor agitated  Skin:  No rashes. No jaundice, left arm fistula no redness, no tenderness    Reviewed most current lab test results and cultures  YES  Reviewed most current radiology test results   YES  Review and summation of old records today    NO  Reviewed patient's current orders and MAR    YES  PMH/SH reviewed - no change compared to H&P  ________________________________________________________________________  Care Plan discussed with:    Comments   Patient y    Family      RN y    Care Manager     Consultant                        Multidiciplinary team rounds were held today with , nursing, pharmacist and clinical coordinator. Patient's plan of care was discussed; medications were reviewed and discharge planning was addressed.      ________________________________________________________________________  Total NON critical care TIME: 35  Minutes    Total CRITICAL CARE TIME Spent:   Minutes non procedure based      Comments   >50% of visit spent in counseling and coordination of care     ________________________________________________________________________  Roberto Reece MD     Procedures: see electronic medical records for all procedures/Xrays and details which were not copied into this note but were reviewed prior to creation of Plan. LABS:  I reviewed today's most current labs and imaging studies.   Pertinent labs include:  Recent Labs      06/02/17 0335 06/01/17   0946   WBC  11.4*  16.2*   HGB  8.0*  9.8*   HCT  23.9*  28.8*   PLT  128*  172     Recent Labs      06/02/17 0335 06/01/17   1359  06/01/17   0946   NA  131*   --   134*   K  3.7   --   2.7*   CL  95*   --   95*   CO2  27   --   30   GLU  80   --   89   BUN  24*   --   10   CREA  8.07*   --   6.32*   CA  8.6   --   9.6   MG  2.0   --    --    PHOS  1.8*   --    --    ALB  2.9*   --   3.7   TBILI  0.6   --   0.7   SGOT  15   --   14*   ALT  13   --   13   INR   --   3.4*   --        Signed: Roberto Reece MD

## 2017-06-02 NOTE — PROGRESS NOTES
Pt is a 46 y.o  [place] male admitted with Severe Sepsis. Pt was alert, oriented and in no distress resting in bed. Demographic information verified and all is correct. Pt lives with his sister in a 1 story home with a few steps. Denies using DME but does hemodialysis at home. Pt started doing home dialysis in March of this year and has been receiving dialysis since 2012. Dialysis company is [facility]. Prior to admission, pt was independent with his ADL's and IADL's and drives. Pt had home health in the past and no rehab. Preferred pharmacy is [facility]. Pt's family can transport pt home at discharge. CM will continue to follow pt for discharge planning needs. Care Management Interventions  PCP Verified by CM: Yes (Dr. Holley Cullen - saw PCP 3 mths ago)  Mode of Transport at Discharge: Other (see comment) (family can transport by car)  Transition of Care Consult (CM Consult): Discharge Planning  Discharge Durable Medical Equipment: No (hemodialysis machine )  Physical Therapy Consult: No  Occupational Therapy Consult: No  Speech Therapy Consult: No  Current Support Network:  Other, Own Home (lives with his sister in a 1 story home with steps)  Confirm Follow Up Transport: Family  Discharge Location  Discharge Placement: [address] [place]

## 2017-06-02 NOTE — CONSULTS
Nephrology Consult Note     Blaise Deana     www. [facility]                    Phone - [phone]   Patient: Ailyn Delgado  YOB: 1975       MRN: [id number]  David Marcelino MD   Age: 46 y.o. Sex: male    ADMIT DATE:6/1/2017   CONSULTATION DATE:6/2/2017    REQUESTING PHYSICIAN: Dottie Syed   REASON FOR CONSULTATION: Ailyn Delgado is a 46 y.o.  male for whom we are asked to see regarding continuance of dialysis. ASSESSMENT:   · End Stage Renal Disease -  · Anemia of CKD  · Secondary hyperparathyroidism of renal origin  · Sepsis  · fever    PLAN:   · No hd today  · Hd on Saturday. · No signs of infection at avf site  · Give epogen and venofer today  · Sepsis work up per primary team    · Principal Problem:  ·   Severe sepsis ([address] (6/1/2017)  ·   · Active Problems:  ·   Abdominal pain (11/2/2011)  ·   ·   CAD (coronary artery disease) ()  ·   ·   ESRD (end stage renal disease) on dialysis ([address] (5/14/2013)  ·   ·     Subjective:   HPI: Ailyn Delgado is a 46 y.o.  male. The patient is followed by  Dr.TODD Theo Marquez. Dialysis is performed via RIGHT ARM AVF  He is on home hemodialysis. 5 days per week 2.5 hr per day  He developed fever during dialysis  He came to er after his hd. He has been evaluated for sepsis. His k was appropriately low on admission - as labs were drawn immediately after hd  His CT ABDO. SHOWED fluid collection in liver. No c/o sob, fever. No c/o chest pain    He has h/o nephrotic syndrome in past  He started dialysis in 1989. He was started on pd at that time. He got his first renal tx in 1992. He was back on pd in [address] [place] [address]. He continued on pd till 2001. He received living related renal tx from sister in 2001. He is on hemodialysis since 2012 . He is on home hemo. He has avf. No permcath for dialysis access. He has no signs of infection over avf.   Past Medical Hx:   Past Medical History: Diagnosis Date    Abdominal hematoma     AICD (automatic cardioverter/defibrillator) present     CAD (coronary artery disease)     anterior MI s/p 2 stents  2007    Chronic abdominal pain     Chronic kidney disease     ESRD; Dialysis dependent.  DVT (deep venous thrombosis) [facility] 2001    DVT of popliteal vein ([facility] 11/2011    not anticoagulated due to bleeding, IVC filter    Gallstone pancreatitis     Gastrointestinal disorder     acid reflux    Gastrointestinal disorder     peptic ulcer    Hemodialysis patient ([facility]     High cholesterol     Hypertension     Kidney transplant     b/l kidneys 1995, 2001    Nephrotic syndrome     Other ill-defined conditions     kidny transplant x2,  on dialysis    Other ill-defined conditions     home dialysis    Other ill-defined conditions     hx recurrent left leg DVT    Peritonitis (HCC)     Seizures (HCC)     Small bowel obstruction (HCC)     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    V-tach [facility])         Past Surgical Hx:     Past Surgical History:   Procedure Laterality Date    HX APPENDECTOMY      HX CHOLECYSTECTOMY      HX OTHER SURGICAL      cholecystectomy    HX OTHER SURGICAL  11/2011    exploratory laparotomy    HX OTHER SURGICAL      fem-pop    HX OTHER SURGICAL  02/2013    right upper extremity fistula    HX PACEMAKER  6/2009    AICD    HX SMALL BOWEL RESECTION      HX TRANSPLANT  2001     Kidney    HX UROLOGICAL      2 kidney transplants    CA EDG US EXAM SURGICAL ALTER STOM DUODENUM/JEJUNUM  7/15/2011         CA ESOPHAGOGASTRODUODENOSCOPY TRANSORAL DIAGNOSTIC  5/24/2012            Medications:  Prior to Admission medications    Medication Sig Start Date End Date Taking? Authorizing Provider   warfarin (COUMADIN) 2.5 mg tablet Take 2.5 mg by mouth every Sunday. Yes Historical Provider   omeprazole (PRILOSEC) 20 mg capsule Take 20 mg by mouth daily.    Yes Historical Provider   calcitRIOL (ROCALTROL) 0.5 mcg capsule Take 0.5 mcg by mouth daily. Yes Historical Provider   levETIRAcetam (KEPPRA) 750 mg tablet Take 750 mg by mouth two (2) times a day. Yes Morro Padron MD   lacosamide (VIMPAT) 50 mg tab tablet Take 50 mg by mouth two (2) times a day. Yes Morro Padron MD   atorvastatin (LIPITOR) 20 mg tablet Take 20 mg by mouth daily. Yes Morro Padron MD   warfarin (COUMADIN) 5 mg tablet Decrease coumadin dose to 2.5mg while taking these antibiotics. Recheck INR every 2 days until you finish the antibiotics. Patient taking differently: Take 3.5 mg by mouth six (6) days a week. Monday thru Saturday 9/9/15  Yes Mario Key MD   clopidogrel (PLAVIX) 75 mg tablet Take  by mouth daily. Yes Morro Padron MD   sevelamer carbonate (RENVELA) 800 mg Tab tab Take 2,400 mg by mouth three (3) times daily (with meals). Yes Morro Padron MD   aspirin 81 mg chewable tablet Take 81 mg by mouth daily. Yes Morro Padron MD       Allergies   Allergen Reactions    Shellfish Containing Products Swelling     Break out in rash, trouble breathing    Heparin Analogues Other (comments)     Positive history of HIT    Iodinated Contrast Media - Oral And Iv Dye Other (comments)     Because of renal disease. No rash or hives per patient report 1/14/2012       Social Hx:  reports that he has never smoked. He has never used smokeless tobacco. He reports that he does not drink alcohol or use illicit drugs. Family History   Problem Relation Age of Onset    COPD Mother     Lung Disease Mother     Cancer Father      stomach    Cancer Maternal Grandmother        Review of Systems:  A 11 point review of system was performed today. Pertinent positives and negatives are mentioned in the HPI. The reminder of the ROS is negative and noncontributory.   Allergies   Allergen Reactions    Shellfish Containing Products Swelling     Break out in rash, trouble breathing    Heparin Analogues Other (comments)     Positive history of HIT    Iodinated Contrast Media - Oral And Iv Dye Other (comments)     Because of renal disease. No rash or hives per patient report 1/14/2012      Objective:    Vitals:    Vitals:    06/02/17 0654 06/02/17 0735 06/02/17 0933 06/02/17 1120   BP: 107/69 104/68  110/71   Pulse: 83 83  92   Resp: 18 18  18   Temp: 99 °F (37.2 °C) 99.4 °F (37.4 °C)  98.6 °F (37 °C)   SpO2: 98% 98%  98%   Weight:   63 kg (138 lb 12.8 oz)    Height:         I&O's:       Physical Exam:  General:Alert, No distress,   Eyes:No scleral icterus, No conjunctival pallor  Neck:Supple,no mass palpable,no thyromegaly  Lungs:Clears to auscultation Bilaterally, normal respiratory effort  CVS:RRR, S1 S2 normal,  No rub,  Abdomen:Soft, Non tender, No hepatosplenomegaly  Extremities: no LE edema, right arm avf +  Skin:No rash or lesions, Warm and DRY   :  No oneal  Musculoskeletal : no joint pain, no joint tenderness  Psych: AAO X 3, appropriate affect   NEURO: non focal , normal speech   Dialysis Access:  Right arm avf    Care Plan discussed with:  Patient and nurse     Chart reviewed. ECG[de-identified] Rev:no  Xray/CT/US/MRI REV:yes  CT of the abdomen and pelvis is performed with 5 mm collimation. Study is  performed with PO and 100 cc of nonionic Isovue 370. Sagittal and coronal  reformatted images were also performed.     CT dose reduction was achieved with the use of the standardized protocol  tailored for this examination and automatic exposure control for dose  modulation.     Direct comparison is made to prior CT dated February 2017 and prior CT CT dated  September 2014.     Findings:     Lung bases: The visualized lung bases are clear.     Liver: The liver is stable in appearance. There is a small thin-walled  collection of fluid posterior right hepatic lobe, measuring 9 mm x 3.5 cm and  measured 1.4 cm x 4.4 cm on prior CT dated February 2017.     Adrenals: Adrenal glands are normal.     Pancreas: The pancreas is normal.     Gallbladder:  The gallbladder is surgically absent.     Kidneys: The kidneys are markedly atrophic and several renal cysts are noted. Within the left hemipelvis, there is a partially calcified atrophic kidney,  unchanged. There is a crescentic thin walled fluid collection medial to the left  hepatic lobe, decreased in size measuring approximately 2.5 cm x 11 cm and  measuring approximately 3.4 cm x 13 cm on prior CT dated February 2017.     Spleen: The spleen is stable.     Lymph nodes. There is a 2.9 cm x 2.8 cm rounded partially calcified mesenteric  soft tissue lesion which is unchanged in size and morphology compared to prior  CT dated February 2017. There is no jennifer hepatitis, retroperitoneal or pelvic  lymphadenopathy. Subcentimeter retroperitoneal lymph nodes are unchanged.     Bowel: No thickened or dilated loop of large or small bowel is visualized.     Urinary bladder: Urinary bladder is partially filled and grossly normal.     Miscellaneous: There is no free intraperitoneal gas.      IMPRESSION  IMPRESSION: No bowel obstruction, ileus or perforation  Lab Data Personally Reviewed: (see below)    Recent Labs      06/02/17 0335 06/01/17   0946   NA  131*  134*   K  3.7  2.7*   CL  95*  95*   CO2  27  30   BUN  24*  10   CREA  8.07*  6.32*   GLU  80  89   CA  8.6  9.6   MG  2.0   --    PHOS  1.8*   --      Recent Labs      06/02/17 0335  06/01/17   0946   WBC  11.4*  16.2*   HGB  8.0*  9.8*   HCT  23.9*  28.8*   PLT  128*  172     Lab Results   Component Value Date/Time    Specimen Description: NARES 05/30/2013 05:53 PM    Specimen Description: URINE 11/16/2012 05:15 PM    Specimen Description: URINE 09/17/2012 01:35 AM     Lab Results   Component Value Date/Time    Culture result:  06/01/2017 09:46 AM     Preliminary report of probable S. aureus (Negative for antigen detection) confirmation and sensitivities to follow.   GROWING IN 2 OF 4 BOTTLES DRAWN  ([facility] SITE)      Culture result:  06/01/2017 09:46 AM     PRELIMINARY REPORT OF  [facility] POSITIVE COCCI  IN CLUSTERS  GROWING IN 2 OF 4 BOTTLES DRAWN  CALLED TO AND READ BACK BY  MS ALFREDA ADEN RN ON 6/1/17 AT [address] [facility], [address]). MS      Culture result:  06/01/2017 09:46 AM     GRAM POSITIVE COCCI  IN CLUSTERS   GROWING IN THIRD AND FOURTH BOTTLES DRAWN   (SITE = [place] [address])       Prior to Admission medications    Medication Sig Start Date End Date Taking? Authorizing Provider   warfarin (COUMADIN) 2.5 mg tablet Take 2.5 mg by mouth every Sunday. Yes Historical Provider   omeprazole (PRILOSEC) 20 mg capsule Take 20 mg by mouth daily. Yes Historical Provider   calcitRIOL (ROCALTROL) 0.5 mcg capsule Take 0.5 mcg by mouth daily. Yes Historical Provider   levETIRAcetam (KEPPRA) 750 mg tablet Take 750 mg by mouth two (2) times a day. Yes Morro Padron MD   lacosamide (VIMPAT) 50 mg tab tablet Take 50 mg by mouth two (2) times a day. Yes Morro Padron MD   atorvastatin (LIPITOR) 20 mg tablet Take 20 mg by mouth daily. Yes Morro Padron MD   warfarin (COUMADIN) 5 mg tablet Decrease coumadin dose to 2.5mg while taking these antibiotics. Recheck INR every 2 days until you finish the antibiotics. Patient taking differently: Take 3.5 mg by mouth six (6) days a week. Monday thru Saturday 9/9/15  Yes Dayne Cordoba MD   clopidogrel (PLAVIX) 75 mg tablet Take  by mouth daily. Yes Morro Padron MD   sevelamer carbonate (RENVELA) 800 mg Tab tab Take 2,400 mg by mouth three (3) times daily (with meals). Yes Morro Padron MD   aspirin 81 mg chewable tablet Take 81 mg by mouth daily. Yes Morro Padron MD     Medications list Personally Reviewed   [x]          Yes     []               No    Prior to Admission Medications   Prescriptions Last Dose Informant Patient Reported? Taking?   aspirin 81 mg chewable tablet   Yes Yes   Sig: Take 81 mg by mouth daily. atorvastatin (LIPITOR) 20 mg tablet   Yes Yes   Sig: Take 20 mg by mouth daily.    calcitRIOL (ROCALTROL) 0.5 mcg capsule   Yes Yes   Sig: Take 0.5 mcg by mouth daily.   clopidogrel (PLAVIX) 75 mg tablet   Yes Yes   Sig: Take  by mouth daily. lacosamide (VIMPAT) 50 mg tab tablet   Yes Yes   Sig: Take 50 mg by mouth two (2) times a day. levETIRAcetam (KEPPRA) 750 mg tablet   Yes Yes   Sig: Take 750 mg by mouth two (2) times a day. omeprazole (PRILOSEC) 20 mg capsule   Yes Yes   Sig: Take 20 mg by mouth daily. sevelamer carbonate (RENVELA) 800 mg Tab tab   Yes Yes   Sig: Take 2,400 mg by mouth three (3) times daily (with meals). warfarin (COUMADIN) 2.5 mg tablet   Yes Yes   Sig: Take 2.5 mg by mouth every Sunday. warfarin (COUMADIN) 5 mg tablet   No Yes   Sig: Decrease coumadin dose to 2.5mg while taking these antibiotics. Recheck INR every 2 days until you finish the antibiotics. Patient taking differently: Take 3.5 mg by mouth six (6) days a week. Monday thru Saturday      Facility-Administered Medications: None       Thank you for allowing us to participate in the care this patient. We will follow patient with you. Chaya Arrington, [address] [place] [address] [facility]  [facility] [address], Diana [place], [address]  Phone - [phone]         Fax - [phone] [facility] Office  [address] [place] [address]  Phone - [phone]        Fax - [phone]     www. [facility]

## 2017-06-02 NOTE — PROGRESS NOTES
Visited by Abena Tristan Partner 6/2/17.     Fuentes Disla, [facility] Paging Service  [phone]

## 2017-06-02 NOTE — ROUTINE PROCESS
* No surgery found *  Bedside and Verbal shift change report given to Lorri (oncoming nurse) by Cachorro Saldana RN (offgoing nurse). Report included the following information SBAR, Kardex and Recent Results.     Zone Phone:   [phone]      Significant changes during shift:  Pain relief with Fentanyl        Patient Information    Marcelino Yap  46 y.o.  6/1/2017  9:10 AM by Santana Douglas MD. Marcelino Yap was admitted from Home    Problem List    Patient Active Problem List    Diagnosis Date Noted    Severe sepsis ([address] 06/01/2017    Acute encephalopathy 09/26/2014    Hypoglycemia 09/16/2014    Congestive heart failure, unspecified 09/11/2014    Encephalopathy acute 09/07/2014    Pulmonary edema 09/07/2014    Hyperkalemia 10/06/2013    ESRD (end stage renal disease) on dialysis ([address] 05/14/2013    Dialysis patient ([address] 05/14/2013    Abdominal pain, other specified site 05/14/2013    S/p cadaver renal transplant 05/23/2012    AICD (automatic cardioverter/defibrillator) present 05/23/2012    Arterial occlusion ([address] 02/19/2012    Paronychia of great toe, left 01/19/2012    Acute renal failure ([address] 01/13/2012    Abscess of abdominal cavity ([address] 12/18/2011    Peritonitis ([address] 12/06/2011    Mesenteric hematoma 12/06/2011    Malnutrition ([address] 12/06/2011    DVT (deep venous thrombosis) ([address] 12/06/2011    S/P IVC filter 12/06/2011    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[address] 11/25/2011    HTN (hypertension) 11/06/2011    Anemia 11/06/2011    CHRISTINE (acute kidney injury) ([address] 11/06/2011    S/P appendectomy 11/06/2011    SBO (small bowel obstruction) ([address] 11/06/2011    High cholesterol     Other ill-defined conditions     CAD (coronary artery disease)     Gastrointestinal disorder     Abdominal pain 11/02/2011    Dehydration 07/13/2011    Nausea & vomiting 07/13/2011    Abdominal pain, acute, epigastric 07/13/2011    Serum total bilirubin elevated 07/13/2011    Acute pancreatitis 07/12/2011    Kidney transplant 07/12/2011    Coronary atherosclerosis of native coronary artery 07/12/2011     Past Medical History:   Diagnosis Date    Abdominal hematoma     AICD (automatic cardioverter/defibrillator) present     CAD (coronary artery disease)     anterior MI s/p 2 stents  2007    Chronic abdominal pain     Chronic kidney disease     ESRD; Dialysis dependent.  DVT (deep venous thrombosis) ([facility] 2001    DVT of popliteal vein ([facility] 11/2011    not anticoagulated due to bleeding, IVC filter    Gallstone pancreatitis     Gastrointestinal disorder     acid reflux    Gastrointestinal disorder     peptic ulcer    Hemodialysis patient ([facility]     High cholesterol     Hypertension     Kidney transplant     b/l kidneys 1995, 2001    Nephrotic syndrome     Other ill-defined conditions     kidny transplant x2,  on dialysis    Other ill-defined conditions     home dialysis    Other ill-defined conditions     hx recurrent left leg DVT    Peritonitis (HCC)     Seizures (HCC)     Small bowel obstruction (HCC)     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    V-tach ([facility]          Core Measures:    CVA: No No  CHF:No No  PNA:No No    Activity Status:    OOB to Chair No  Ambulated this shift No   Bed Rest Yes    Supplemental O2: (If Applicable)    NC No  NRB No  Venti-mask No  On 0   Liters/min      LINES AND DRAINS:    PIV only    AV fistula right arm    Patient Safety:    Falls Score Total Score: 1  Safety Level_______  Bed Alarm On? No  Sitter?  No    Plan for upcoming shift: abx        Discharge Plan: No     Active Consults:  IP CONSULT TO GENERAL SURGERY  IP CONSULT TO NEPHROLOGY

## 2017-06-02 NOTE — CONSULTS
Blaise Leblanc     NAME:Claude Lourdes [facility]  XPJ:[id number]   :1975   -      Pt seen and examined. No hd today  Next hd Saturday. Give epogen and venofer  Hold renvela due to low phos level           Kemal Ashford MD  [facility]  [facility]  [address], [place]  [place], [address]  Phone - [phone]         Fax - [phone] [facility] Office  [address] [place] [address]  Phone - [phone]        Fax - [phone]     [email or web address]

## 2017-06-03 LAB
ANION GAP BLD CALC-SCNC: 11 MMOL/L (ref 5–15)
BACTERIA SPEC CULT: ABNORMAL
BACTERIA SPEC CULT: ABNORMAL
BUN SERPL-MCNC: 44 MG/DL (ref 6–20)
BUN/CREAT SERPL: 4 (ref 12–20)
CALCIUM SERPL-MCNC: 8.6 MG/DL (ref 8.5–10.1)
CHLORIDE SERPL-SCNC: 95 MMOL/L (ref 97–108)
CO2 SERPL-SCNC: 23 MMOL/L (ref 21–32)
CREAT SERPL-MCNC: 10.8 MG/DL (ref 0.7–1.3)
GLUCOSE SERPL-MCNC: 75 MG/DL (ref 65–100)
INR PPP: 2.1 (ref 0.9–1.1)
LACTATE SERPL-SCNC: 0.8 MMOL/L (ref 0.4–2)
POTASSIUM SERPL-SCNC: 3.8 MMOL/L (ref 3.5–5.1)
PROTHROMBIN TIME: 21.4 SEC (ref 9–11.1)
SERVICE CMNT-IMP: ABNORMAL
SODIUM SERPL-SCNC: 129 MMOL/L (ref 136–145)

## 2017-06-03 PROCEDURE — 74011250636 HC RX REV CODE- 250/636: Performed by: HOSPITALIST

## 2017-06-03 PROCEDURE — 80048 BASIC METABOLIC PNL TOTAL CA: CPT | Performed by: EMERGENCY MEDICINE

## 2017-06-03 PROCEDURE — 74011250636 HC RX REV CODE- 250/636: Performed by: EMERGENCY MEDICINE

## 2017-06-03 PROCEDURE — 65660000000 HC RM CCU STEPDOWN

## 2017-06-03 PROCEDURE — 83605 ASSAY OF LACTIC ACID: CPT | Performed by: SURGERY

## 2017-06-03 PROCEDURE — 36415 COLL VENOUS BLD VENIPUNCTURE: CPT | Performed by: SURGERY

## 2017-06-03 PROCEDURE — 5A1D60Z PERFORMANCE OF URINARY FILTRATION, MULTIPLE: ICD-10-PCS | Performed by: INTERNAL MEDICINE

## 2017-06-03 PROCEDURE — 74011250637 HC RX REV CODE- 250/637: Performed by: HOSPITALIST

## 2017-06-03 PROCEDURE — 87040 BLOOD CULTURE FOR BACTERIA: CPT | Performed by: HOSPITALIST

## 2017-06-03 PROCEDURE — 85610 PROTHROMBIN TIME: CPT | Performed by: HOSPITALIST

## 2017-06-03 PROCEDURE — 90935 HEMODIALYSIS ONE EVALUATION: CPT

## 2017-06-03 PROCEDURE — 74640000003 HC CRRT SET UP OR EXCHANGE

## 2017-06-03 RX ORDER — CLOPIDOGREL BISULFATE 75 MG/1
75 TABLET ORAL DAILY
Status: DISCONTINUED | OUTPATIENT
Start: 2017-06-04 | End: 2017-06-03

## 2017-06-03 RX ORDER — CEFAZOLIN SODIUM IN 0.9 % NACL 2 G/100 ML
2 PLASTIC BAG, INJECTION (ML) INTRAVENOUS ONCE
Status: COMPLETED | OUTPATIENT
Start: 2017-06-03 | End: 2017-06-03

## 2017-06-03 RX ORDER — CLOPIDOGREL BISULFATE 75 MG/1
75 TABLET ORAL DAILY
Status: DISCONTINUED | OUTPATIENT
Start: 2017-06-05 | End: 2017-06-06 | Stop reason: HOSPADM

## 2017-06-03 RX ORDER — CEFAZOLIN SODIUM IN 0.9 % NACL 2 G/100 ML
2 PLASTIC BAG, INJECTION (ML) INTRAVENOUS
Status: DISCONTINUED | OUTPATIENT
Start: 2017-06-03 | End: 2017-06-05

## 2017-06-03 RX ADMIN — CEFAZOLIN 2 G: 10 INJECTION, POWDER, FOR SOLUTION INTRAVENOUS; PARENTERAL at 15:33

## 2017-06-03 RX ADMIN — Medication 10 ML: at 22:15

## 2017-06-03 RX ADMIN — OXYCODONE HYDROCHLORIDE AND ACETAMINOPHEN 1 TABLET: 5; 325 TABLET ORAL at 19:47

## 2017-06-03 RX ADMIN — LEVETIRACETAM 750 MG: 250 TABLET, FILM COATED ORAL at 17:58

## 2017-06-03 RX ADMIN — CALCITRIOL 0.5 MCG: 0.25 CAPSULE, LIQUID FILLED ORAL at 12:34

## 2017-06-03 RX ADMIN — ASPIRIN 81 MG CHEWABLE TABLET 81 MG: 81 TABLET CHEWABLE at 12:34

## 2017-06-03 RX ADMIN — FENTANYL CITRATE 25 MCG: 50 INJECTION, SOLUTION INTRAMUSCULAR; INTRAVENOUS at 17:58

## 2017-06-03 RX ADMIN — FENTANYL CITRATE 25 MCG: 50 INJECTION, SOLUTION INTRAMUSCULAR; INTRAVENOUS at 09:42

## 2017-06-03 RX ADMIN — FENTANYL CITRATE 25 MCG: 50 INJECTION, SOLUTION INTRAMUSCULAR; INTRAVENOUS at 05:30

## 2017-06-03 RX ADMIN — Medication 10 ML: at 05:30

## 2017-06-03 RX ADMIN — SODIUM CHLORIDE 750 MG: 900 INJECTION, SOLUTION INTRAVENOUS at 12:21

## 2017-06-03 RX ADMIN — FENTANYL CITRATE 25 MCG: 50 INJECTION, SOLUTION INTRAMUSCULAR; INTRAVENOUS at 22:13

## 2017-06-03 RX ADMIN — LACOSAMIDE 50 MG: 100 TABLET, FILM COATED ORAL at 12:34

## 2017-06-03 RX ADMIN — WARFARIN SODIUM 2.5 MG: 2 TABLET ORAL at 19:43

## 2017-06-03 RX ADMIN — LEVETIRACETAM 750 MG: 250 TABLET, FILM COATED ORAL at 12:34

## 2017-06-03 RX ADMIN — FENTANYL CITRATE 25 MCG: 50 INJECTION, SOLUTION INTRAMUSCULAR; INTRAVENOUS at 13:56

## 2017-06-03 RX ADMIN — Medication 10 ML: at 14:18

## 2017-06-03 RX ADMIN — PANTOPRAZOLE SODIUM 40 MG: 40 TABLET, DELAYED RELEASE ORAL at 12:42

## 2017-06-03 RX ADMIN — LACOSAMIDE 50 MG: 100 TABLET, FILM COATED ORAL at 17:58

## 2017-06-03 RX ADMIN — OXYCODONE HYDROCHLORIDE AND ACETAMINOPHEN 1 TABLET: 5; 325 TABLET ORAL at 12:34

## 2017-06-03 NOTE — PROGRESS NOTES
Admit Date: 2017    POD * No surgery found *    Procedure:  * No surgery found *    Subjective:     Patient feeling a little better today. Still with LLQ and flank tenderness    Objective:     Blood pressure 110/70, pulse 88, temperature 98.6 °F (37 °C), resp. rate 16, height 5' 7\" (1.702 m), weight 138 lb 12.8 oz (63 kg), SpO2 100 %. Temp (24hrs), Av °F (37.2 °C), Min:98.6 °F (37 °C), Max:99.7 °F (37.6 °C)      Physical Exam:  GENERAL: alert, cooperative, no distress, appears stated age, LUNG: clear to auscultation bilaterally, HEART: regular rate and rhythm, S1, S2 normal, no murmur, click, rub or gallop, ABDOMEN: soft, non-distended, mildly tender.  Bowel sounds normal. No masses,  no organomegaly, EXTREMITIES:  extremities normal, atraumatic, no cyanosis or edema    Labs:   Recent Results (from the past 24 hour(s))   LACTIC ACID, PLASMA    Collection Time: 17  5:15 AM   Result Value Ref Range    Lactic acid 0.8 0.4 - 2.0 MMOL/L   METABOLIC PANEL, BASIC    Collection Time: 17  5:16 AM   Result Value Ref Range    Sodium 129 (L) 136 - 145 mmol/L    Potassium 3.8 3.5 - 5.1 mmol/L    Chloride 95 (L) 97 - 108 mmol/L    CO2 23 21 - 32 mmol/L    Anion gap 11 5 - 15 mmol/L    Glucose 75 65 - 100 mg/dL    BUN 44 (H) 6 - 20 MG/DL    Creatinine 10.80 (H) 0.70 - 1.30 MG/DL    BUN/Creatinine ratio 4 (L) 12 - 20      GFR est AA 6 (L) >60 ml/min/1.73m2    GFR est non-AA 5 (L) >60 ml/min/1.73m2    Calcium 8.6 8.5 - 10.1 MG/DL   CULTURE, BLOOD, PAIRED    Collection Time: 17  5:16 AM   Result Value Ref Range    Special Requests: NO SPECIAL REQUESTS      Culture result: NO GROWTH AFTER 3 HOURS         Data Review images and reports reviewed    Assessment:     Principal Problem:    Severe sepsis ([address] (2017)    Active Problems:    Abdominal pain (2011)      CAD (coronary artery disease) ()      ESRD (end stage renal disease) on dialysis ([facility] (2013)        Plan/Recommendations/Medical Decision Making:     Continue present treatment   + MSSA, source not identified. Continue current tx    Ricardo HESS.  Sheela Mccauley MD, [facility]

## 2017-06-03 NOTE — DIALYSIS
Patient refused to transfer to dialysis suite for treatment due to pain. Will follow up with patient after primary RN, Marnie Tellez, gives patient pain medication.

## 2017-06-03 NOTE — PROGRESS NOTES
Bedside and Verbal shift change report given to Chen Reese RN (oncoming nurse) by Clara Street RN  (offgoing nurse).  Report included the following information SBAR, Kardex and Recent Results.      Zone Phone: [phone]          Significant changes during shift: None              Patient Information      Omega Husain  46 y.o.  6/1/2017 9:10 AM by Liss Batista MD. Omega Husain was admitted from Home      Problem List              Patient Active Problem List      Diagnosis Date Noted    Severe sepsis ([address] 06/01/2017    Acute encephalopathy 09/26/2014    Hypoglycemia 09/16/2014    Congestive heart failure, unspecified 09/11/2014    Encephalopathy acute 09/07/2014    Pulmonary edema 09/07/2014    Hyperkalemia 10/06/2013    ESRD (end stage renal disease) on dialysis ([address] 05/14/2013    Dialysis patient ([address] 05/14/2013    Abdominal pain, other specified site 05/14/2013    S/p cadaver renal transplant 05/23/2012    AICD (automatic cardioverter/defibrillator) present 05/23/2012    Arterial occlusion ([address] 02/19/2012    Paronychia of great toe, left 01/19/2012    Acute renal failure ([address] 01/13/2012    Abscess of abdominal cavity ([address] 12/18/2011    Peritonitis ([address] 12/06/2011    Mesenteric hematoma 12/06/2011    Malnutrition ([address] 12/06/2011    DVT (deep venous thrombosis) ([address] 12/06/2011    S/P IVC filter 12/06/2011    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[address] 11/25/2011    HTN (hypertension) 11/06/2011    Anemia 11/06/2011    CHRISTINE (acute kidney injury) ([address] 11/06/2011    S/P appendectomy 11/06/2011    SBO (small bowel obstruction) ([address] 11/06/2011    High cholesterol       Other ill-defined conditions       CAD (coronary artery disease)       Gastrointestinal disorder       Abdominal pain 11/02/2011    Dehydration 07/13/2011    Nausea & vomiting 07/13/2011    Abdominal pain, acute, epigastric 07/13/2011    Serum total bilirubin elevated 07/13/2011    Acute pancreatitis 07/12/2011    Kidney transplant 07/12/2011    Coronary atherosclerosis of native coronary artery 07/12/2011               Past Medical History:   Diagnosis Date    Abdominal hematoma       AICD (automatic cardioverter/defibrillator) present       CAD (coronary artery disease)         anterior MI s/p 2 stents 2007    Chronic abdominal pain       Chronic kidney disease         ESRD; Dialysis dependent.  DVT (deep venous thrombosis) ([facility] 2001    DVT of popliteal vein ([facility] 11/2011      not anticoagulated due to bleeding, IVC filter    Gallstone pancreatitis       Gastrointestinal disorder         acid reflux    Gastrointestinal disorder         peptic ulcer    Hemodialysis patient ([facility]       High cholesterol       Hypertension       Kidney transplant         b/l kidneys 1995, 2001    Nephrotic syndrome       Other ill-defined conditions         kidny transplant x2, on dialysis    Other ill-defined conditions         home dialysis    Other ill-defined conditions         hx recurrent left leg DVT    Peritonitis (HCC)       Seizures (HCC)       Small bowel obstruction (HCC)       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HCC)         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 No  Sitter?  No      Plan for upcoming shift: None              Discharge Plan: No       Active Consults:  IP CONSULT TO GENERAL SURGERY  IP CONSULT TO NEPHROLOGY

## 2017-06-03 NOTE — ROUTINE PROCESS
* No surgery found *  Bedside and Verbal shift change report given to Lorri (oncoming nurse) by Óscar Arriaza RN (offgoing nurse).  Report included the following information SBAR, Kardex and Recent Results.     Zone Phone: [phone]        Significant changes during shift: Paired blood cultures drawn, patient to dialysis           Patient Information     Delgado Fall  46 y.o.  6/1/2017 9:10 AM by Adela Jones MD. Delgado Fall was admitted from Home     Problem List          Patient Active Problem List     Diagnosis Date Noted    Severe sepsis ([address] 06/01/2017    Acute encephalopathy 09/26/2014    Hypoglycemia 09/16/2014    Congestive heart failure, unspecified 09/11/2014    Encephalopathy acute 09/07/2014    Pulmonary edema 09/07/2014    Hyperkalemia 10/06/2013    ESRD (end stage renal disease) on dialysis ([address] 05/14/2013    Dialysis patient ([address] 05/14/2013    Abdominal pain, other specified site 05/14/2013    S/p cadaver renal transplant 05/23/2012    AICD (automatic cardioverter/defibrillator) present 05/23/2012    Arterial occlusion ([address] 02/19/2012    Paronychia of great toe, left 01/19/2012    Acute renal failure ([address] 01/13/2012    Abscess of abdominal cavity ([address] 12/18/2011    Peritonitis ([address] 12/06/2011    Mesenteric hematoma 12/06/2011    Malnutrition ([address] 12/06/2011    DVT (deep venous thrombosis) ([address] 12/06/2011    S/P IVC filter 12/06/2011    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[address] 11/25/2011    HTN (hypertension) 11/06/2011    Anemia 11/06/2011    CHRISTINE (acute kidney injury) ([address] 11/06/2011    S/P appendectomy 11/06/2011    SBO (small bowel obstruction) ([address] 11/06/2011    High cholesterol      Other ill-defined conditions      CAD (coronary artery disease)      Gastrointestinal disorder      Abdominal pain 11/02/2011    Dehydration 07/13/2011    Nausea & vomiting 07/13/2011    Abdominal pain, acute, epigastric 07/13/2011    Serum total bilirubin elevated 07/13/2011    Acute pancreatitis 07/12/2011    Kidney transplant 07/12/2011    Coronary atherosclerosis of native coronary artery 07/12/2011           Past Medical History:   Diagnosis Date    Abdominal hematoma      AICD (automatic cardioverter/defibrillator) present      CAD (coronary artery disease)       anterior MI s/p 2 stents 2007    Chronic abdominal pain      Chronic kidney disease       ESRD; Dialysis dependent.  DVT (deep venous thrombosis) ([facility] 2001    DVT of popliteal vein ([facility] 11/2011     not anticoagulated due to bleeding, IVC filter    Gallstone pancreatitis      Gastrointestinal disorder       acid reflux    Gastrointestinal disorder       peptic ulcer    Hemodialysis patient ([facility]      High cholesterol      Hypertension      Kidney transplant       b/l kidneys 1995, 2001    Nephrotic syndrome      Other ill-defined conditions       kidny transplant x2, on dialysis    Other ill-defined conditions       home dialysis    Other ill-defined conditions       hx recurrent left leg DVT    Peritonitis (HCC)      Seizures (HCC)      Small bowel obstruction (HCC)      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HCC)       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 No  Sitter?  No     Plan for upcoming shift: dialysis           Discharge Plan: No      Active Consults:  IP CONSULT TO GENERAL SURGERY  IP CONSULT TO NEPHROLOGY

## 2017-06-03 NOTE — PROGRESS NOTES
Hospitalist Progress Note    NAME: Tyrell Bryan   :  1975   MRN:  [id number]       Interim Hospital Summary: 46 y.o. male whom presented on 2017 with      Assessment / Plan:    Severe Sepsis, POA with no clear source  MSSA bacteremia ( source not found so far)  Intra-abdominal fluid collections, POA , fistula site looks ok  Hx necrotic bowel s/ surgery   Hx gallstone pancreatitis s/p aashish   Hx frequent abdominal pain without organic etiology      MSSA bacteremia, change to ancef, stop vanco, levo flagyl  Follow the repeat cultures  Check TTE,    check duplex of HD access  For completeness of source  Chest xray neg  Seen by surgery-- for abdominal fluid collections ( which was improved from prior CT)and with significant surgical historyno need for intervention  Need to figure out the source of bacteremia  No back pain   Pt tells with intermittent abdominal pain radiates to flank    Home HD        ESRD on HD at home  --nephrology following  hypokalemia treated     CAD s/p PCI LAD with bare metal stent  [address] EF 15% s/p AICD  --continue aspirin. Resume plavix,, as no plans for surgery     Hx recurrent left leg DVT  S/p left leg bypass   Resume coumadin, pharmacy to dose, check inr     Hx heparin induced thrombocytopenia  --no heparin products     Hx seizure d/o  --continue keppra and vimpat     Hx nephrotic syndrome causing renal failure  Hx failed cadaveric renal transplants x 2     Code: full  DVT prophylaxis: coumadin  Surrogate decision maker: sister      Body mass index is 21.74 kg/(m^2). Subjective:     Chief Complaint / Reason for Physician Visit  Follow up on abdominal pain  Doing ok    Discussed with RN events overnight.      Review of Systems:  Symptom Y/N Comments  Symptom Y/N Comments   Fever/Chills n   Chest Pain n    Poor Appetite    Edema     Cough n   Abdominal Pain n    Sputum    Joint Pain     SOB/ZAMUDIO    Pruritis/Rash     Nausea/vomit    Tolerating PT/OT     Diarrhea Tolerating Diet     Constipation    Other       Could NOT obtain due to:      Objective:     VITALS:   Last 24hrs VS reviewed since prior progress note. Most recent are:  Patient Vitals for the past 24 hrs:   Temp Pulse Resp BP SpO2   06/03/17 0528 - 88 16 110/70 100 %   06/03/17 0317 98.6 °F (37 °C) 84 18 108/68 100 %   06/02/17 2320 99.7 °F (37.6 °C) 94 18 109/71 99 %   06/02/17 2000 99 °F (37.2 °C) 92 18 103/70 99 %   06/02/17 1503 98.7 °F (37.1 °C) 87 18 101/73 100 %     No intake or output data in the 24 hours ending 06/03/17 1347     PHYSICAL EXAM:  General: WD, WN. Alert, cooperative,  no acute distress    EENT:  EOMI. Anicteric sclerae. MMM  Resp:  B/l air entry. No accessory muscle use  CV:  Regular  rhythm,  No edema  GI:  Soft, Non distended, minimal tenderness.  +Bowel sounds  Neurologic:  Alert and oriented X 3, normal speech,   Psych:   Good insight. Not anxious nor agitated  Skin:  No rashes. No jaundice, left arm fistula no redness, no tenderness    Reviewed most current lab test results and cultures  YES  Reviewed most current radiology test results   YES  Review and summation of old records today    NO  Reviewed patient's current orders and MAR    YES  PMH/SH reviewed - no change compared to H&P  ________________________________________________________________________  Care Plan discussed with:    Comments   Patient y    Family      RN y    Care Manager     Consultant                        Multidiciplinary team rounds were held today with , nursing, pharmacist and clinical coordinator. Patient's plan of care was discussed; medications were reviewed and discharge planning was addressed.      ________________________________________________________________________  Total NON critical care TIME: 25  Minutes    Total CRITICAL CARE TIME Spent:   Minutes non procedure based      Comments   >50% of visit spent in counseling and coordination of care ________________________________________________________________________  Derek Tolentino MD     Procedures: see electronic medical records for all procedures/Xrays and details which were not copied into this note but were reviewed prior to creation of Plan. LABS:  I reviewed today's most current labs and imaging studies.   Pertinent labs include:  Recent Labs      06/02/17   0335  06/01/17   0946   WBC  11.4*  16.2*   HGB  8.0*  9.8*   HCT  23.9*  28.8*   PLT  128*  172     Recent Labs      06/03/17   0516  06/02/17   0335  06/01/17   1359  06/01/17   0946   NA  129*  131*   --   134*   K  3.8  3.7   --   2.7*   CL  95*  95*   --   95*   CO2  23  27   --   30   GLU  75  80   --   89   BUN  44*  24*   --   10   CREA  10.80*  8.07*   --   6.32*   CA  8.6  8.6   --   9.6   MG   --   2.0   --    --    PHOS   --   1.8*   --    --    ALB   --   2.9*   --   3.7   TBILI   --   0.6   --   0.7   SGOT   --   15   --   14*   ALT   --   13   --   13   INR   --    --   3.4*   --        Signed: Derek Tolentino MD

## 2017-06-03 NOTE — DIALYSIS
Patient tolerated treatment well and completed four hours of treatment. Fluid removed = 1500 ML. All possible blood rinsed back. Dressings applied post hemostasis. Report exchanged with Jarrett Jiménez RN.

## 2017-06-03 NOTE — PROGRESS NOTES
Dialysis today w/o issues. Will check back tomorrow. Call with any questions. Lupe Quezada MD  [facility]   [address] [address]  Phone - [phone]   Fax - [phone]  www. [email or web address]

## 2017-06-03 NOTE — PROGRESS NOTES
Pharmacy Automatic Renal Dosing Protocol - Antimicrobials    Indication for Antimicrobials: Sepsis / bacteremia / possible intra-abdominal  Current Regimen of Each Antimicrobial: Cefazolin 1 gram daily (6/3 day 1)    Previous:   Vancomycin 1250 mg IV load followed by 750 mg IV after dialysis (started  Day #3)  Levaquin 750 mg IV x1 then 500 mg IV every 48 hours (started -day #3)  Metronidazole 500 mg IV every 8 hours (started  day 3)    Significant cultures:    Blood: GPC 4/ bottles - SA cefazolin sensitive  6/3 Blood - NGTD - pending      CAPD, Intermittent Hemodialysis or Renal Replacement Therapy: HD  Paralysis, amputations, malnutrition: None documented  Recent Labs      17   0516  17   0335  17   0946   CREA  10.80*  8.07*  6.32*   BUN  44*  24*  10   WBC   --   11.4*  16.2*     Temp (24hrs), Av °F (37.2 °C), Min:98.6 °F (37 °C), Max:99.7 °F (37.6 °C)    Creatinine Clearance (ml/min): HD      Impression/Plan:  Ancef 2 grams twice weekly, 3 grams once weekly per renal dosing protocol. Pharmacy will follow daily and adjust doses of monitored medications as appropriate for renal function and/or serum levels.     Thank you,  Gume Vaughn, PharmD

## 2017-06-03 NOTE — ROUTINE PROCESS
Report given to Mack at change of shift. Patient's condition and treatments discussed at bedside with oncoming nurse. Patient's questions were answered, and today's goals were discussed and written on the eraser board in the room.

## 2017-06-03 NOTE — PROGRESS NOTES
Pharmacy Daily Dosing of Warfarin    Indication (A Fib, CVA, DVT, PE, Ortho Surgery, Heart Valve): h/o recurrent DVT? Goal INR (2-3, 2.5-3.5, 3-4): 2-3      Average Daily Warfarin Dose: Warfarin 2.5 mg PO daily  Concurrent Anticoagulants/Antiplatelets: Plavix 75 mg daily + ASA 81 mg daily  Major Interacting Medications (Dose/Frequency): Metronidazole (discontinued today)    INR (0.9-1.1) > 5 or Platelets (< 90C): discuss with MD:  Recent Labs      06/03/17   1545  06/02/17   0335  06/01/17   1359  06/01/17   0946   INR  2.1*   --   3.4*   --    HGB   --   8.0*   --   9.8*   PLT   --   128*   --   172       Impression/Plan:   - Will give home dose of warfarin 2.5 mg tonight  - Daily INR on order     Pharmacy will follow daily and adjust the dose as appropriate.     Thanks for the consult  Zari Sandoval, [address] Dosing and Monitoring Guidelines

## 2017-06-04 LAB
ANION GAP BLD CALC-SCNC: 6 MMOL/L (ref 5–15)
BASOPHILS # BLD AUTO: 0.1 K/UL (ref 0–0.1)
BASOPHILS # BLD: 1 % (ref 0–1)
BUN SERPL-MCNC: 19 MG/DL (ref 6–20)
BUN/CREAT SERPL: 3 (ref 12–20)
CALCIUM SERPL-MCNC: 8.5 MG/DL (ref 8.5–10.1)
CHLORIDE SERPL-SCNC: 97 MMOL/L (ref 97–108)
CO2 SERPL-SCNC: 33 MMOL/L (ref 21–32)
CREAT SERPL-MCNC: 5.97 MG/DL (ref 0.7–1.3)
EOSINOPHIL # BLD: 0.7 K/UL (ref 0–0.4)
EOSINOPHIL NFR BLD: 11 % (ref 0–7)
ERYTHROCYTE [DISTWIDTH] IN BLOOD BY AUTOMATED COUNT: 13.8 % (ref 11.5–14.5)
GLUCOSE SERPL-MCNC: 78 MG/DL (ref 65–100)
HCT VFR BLD AUTO: 22.3 % (ref 36.6–50.3)
HGB BLD-MCNC: 7.5 G/DL (ref 12.1–17)
INR PPP: 1.9 (ref 0.9–1.1)
LYMPHOCYTES # BLD AUTO: 17 % (ref 12–49)
LYMPHOCYTES # BLD: 1.2 K/UL (ref 0.8–3.5)
MCH RBC QN AUTO: 31.9 PG (ref 26–34)
MCHC RBC AUTO-ENTMCNC: 33.6 G/DL (ref 30–36.5)
MCV RBC AUTO: 94.9 FL (ref 80–99)
MONOCYTES # BLD: 0.5 K/UL (ref 0–1)
MONOCYTES NFR BLD AUTO: 8 % (ref 5–13)
NEUTS SEG # BLD: 4.2 K/UL (ref 1.8–8)
NEUTS SEG NFR BLD AUTO: 63 % (ref 32–75)
PLATELET # BLD AUTO: 115 K/UL (ref 150–400)
POTASSIUM SERPL-SCNC: 3.3 MMOL/L (ref 3.5–5.1)
PROTHROMBIN TIME: 20 SEC (ref 9–11.1)
RBC # BLD AUTO: 2.35 M/UL (ref 4.1–5.7)
SODIUM SERPL-SCNC: 136 MMOL/L (ref 136–145)
WBC # BLD AUTO: 6.7 K/UL (ref 4.1–11.1)

## 2017-06-04 PROCEDURE — 74011250637 HC RX REV CODE- 250/637: Performed by: HOSPITALIST

## 2017-06-04 PROCEDURE — 74011250636 HC RX REV CODE- 250/636: Performed by: HOSPITALIST

## 2017-06-04 PROCEDURE — 65660000000 HC RM CCU STEPDOWN

## 2017-06-04 PROCEDURE — 85025 COMPLETE CBC W/AUTO DIFF WBC: CPT | Performed by: HOSPITALIST

## 2017-06-04 PROCEDURE — 80048 BASIC METABOLIC PNL TOTAL CA: CPT | Performed by: EMERGENCY MEDICINE

## 2017-06-04 PROCEDURE — 85610 PROTHROMBIN TIME: CPT | Performed by: HOSPITALIST

## 2017-06-04 PROCEDURE — 93306 TTE W/DOPPLER COMPLETE: CPT

## 2017-06-04 PROCEDURE — 36415 COLL VENOUS BLD VENIPUNCTURE: CPT | Performed by: HOSPITALIST

## 2017-06-04 RX ADMIN — FENTANYL CITRATE 25 MCG: 50 INJECTION, SOLUTION INTRAMUSCULAR; INTRAVENOUS at 06:09

## 2017-06-04 RX ADMIN — FENTANYL CITRATE 25 MCG: 50 INJECTION, SOLUTION INTRAMUSCULAR; INTRAVENOUS at 14:08

## 2017-06-04 RX ADMIN — WARFARIN SODIUM 3 MG: 2 TABLET ORAL at 18:26

## 2017-06-04 RX ADMIN — LACOSAMIDE 50 MG: 100 TABLET, FILM COATED ORAL at 18:26

## 2017-06-04 RX ADMIN — Medication 10 ML: at 02:11

## 2017-06-04 RX ADMIN — Medication 10 ML: at 18:23

## 2017-06-04 RX ADMIN — OXYCODONE HYDROCHLORIDE AND ACETAMINOPHEN 1 TABLET: 5; 325 TABLET ORAL at 16:38

## 2017-06-04 RX ADMIN — PROCHLORPERAZINE EDISYLATE 5 MG: 5 INJECTION INTRAMUSCULAR; INTRAVENOUS at 06:09

## 2017-06-04 RX ADMIN — LEVETIRACETAM 750 MG: 250 TABLET, FILM COATED ORAL at 18:26

## 2017-06-04 RX ADMIN — FENTANYL CITRATE 25 MCG: 50 INJECTION, SOLUTION INTRAMUSCULAR; INTRAVENOUS at 02:09

## 2017-06-04 RX ADMIN — Medication 10 ML: at 22:23

## 2017-06-04 RX ADMIN — LACOSAMIDE 50 MG: 100 TABLET, FILM COATED ORAL at 08:29

## 2017-06-04 RX ADMIN — FENTANYL CITRATE 25 MCG: 50 INJECTION, SOLUTION INTRAMUSCULAR; INTRAVENOUS at 18:21

## 2017-06-04 RX ADMIN — FENTANYL CITRATE 25 MCG: 50 INJECTION, SOLUTION INTRAMUSCULAR; INTRAVENOUS at 10:03

## 2017-06-04 RX ADMIN — PANTOPRAZOLE SODIUM 40 MG: 40 TABLET, DELAYED RELEASE ORAL at 08:30

## 2017-06-04 RX ADMIN — FENTANYL CITRATE 25 MCG: 50 INJECTION, SOLUTION INTRAMUSCULAR; INTRAVENOUS at 22:21

## 2017-06-04 RX ADMIN — LEVETIRACETAM 750 MG: 250 TABLET, FILM COATED ORAL at 08:29

## 2017-06-04 RX ADMIN — Medication 10 ML: at 06:14

## 2017-06-04 RX ADMIN — ASPIRIN 81 MG CHEWABLE TABLET 81 MG: 81 TABLET CHEWABLE at 08:29

## 2017-06-04 RX ADMIN — Medication 10 ML: at 13:04

## 2017-06-04 RX ADMIN — CALCITRIOL 0.5 MCG: 0.25 CAPSULE, LIQUID FILLED ORAL at 08:30

## 2017-06-04 NOTE — PROGRESS NOTES
Hospitalist Progress Note    NAME: Lauren Robledo   :  1975   MRN:  [id number]       Interim Hospital Summary: 46 y.o. male whom presented on 2017 with      Assessment / Plan:    Severe Sepsis, POA with no clear source  MSSA bacteremia ( source not found so far)  Intra-abdominal fluid collections, POA , fistula site looks ok  Hx necrotic bowel s/ surgery   Hx gallstone pancreatitis s/p aashish   Hx frequent abdominal pain without organic etiology      MSSA bacteremia, change to ancef on 6/3, stop vanco, levo flagyl  Follow the repeat cultures done on 6/3  Check TTE,  check duplex of HD access  For completeness of source  Chest xray neg  Seen by surgery-- for abdominal fluid collections ( which was improved from prior CT)and with significant surgical historyno need for intervention  Need to figure out the source of bacteremia  No back pain   Pt tells with intermittent abdominal pain radiates to flank    Home HD  Would need ID opinion pending above studies      ESRD on HD at home  --nephrology following  hypokalemia treated     CAD s/p PCI LAD with bare metal stent  [address] [facility] EF 15% s/p AICD  --continue aspirin. Resume plavix,, as no plans for surgery     Hx recurrent left leg DVT  S/p left leg bypass   Resume coumadin,       Hx heparin induced thrombocytopenia  --no heparin products     Hx seizure d/o  --continue keppra and vimpat     Hx nephrotic syndrome causing renal failure  Hx failed cadaveric renal transplants x 2     Code: full  DVT prophylaxis: coumadin  Surrogate decision maker: sister      Body mass index is 21.48 kg/(m^2). Subjective:     Chief Complaint / Reason for Physician Visit  Follow up on abdominal pain  Belly pain intermittent radiating to the flank pain    Discussed with RN events overnight.      Review of Systems:  Symptom Y/N Comments  Symptom Y/N Comments   Fever/Chills n   Chest Pain n    Poor Appetite    Edema     Cough n   Abdominal Pain y intermittent   Sputum Joint Pain     SOB/ZAMUDIO    Pruritis/Rash     Nausea/vomit    Tolerating PT/OT     Diarrhea    Tolerating Diet     Constipation    Other       Could NOT obtain due to:      Objective:     VITALS:   Last 24hrs VS reviewed since prior progress note. Most recent are:  Patient Vitals for the past 24 hrs:   Temp Pulse Resp BP SpO2   06/04/17 1300 98.2 °F (36.8 °C) 81 16 116/74 100 %   06/04/17 0757 98.6 °F (37 °C) 75 16 104/69 100 %   06/04/17 0217 98.4 °F (36.9 °C) 83 16 116/78 100 %   06/03/17 2337 98.5 °F (36.9 °C) 78 18 95/62 100 %   06/03/17 1950 98.8 °F (37.1 °C) 99 18 112/76 99 %   06/03/17 1635 99.6 °F (37.6 °C) 77 18 98/60 98 %     No intake or output data in the 24 hours ending 06/04/17 1439     PHYSICAL EXAM:  General: WD, WN. Alert, cooperative,  no acute distress    EENT:  EOMI. Anicteric sclerae. MMM  Resp:  B/l air entry. No accessory muscle use  CV:  Regular  rhythm,  No edema  GI:  Soft, non distended.  +Bowel sounds  Neurologic:  Alert and oriented X 3, normal speech,   Psych:   Good insight. Not anxious nor agitated  Skin:  No rashes. No jaundice, left arm fistula no redness, no tenderness    Reviewed most current lab test results and cultures  YES  Reviewed most current radiology test results   YES  Review and summation of old records today    NO  Reviewed patient's current orders and MAR    YES  PMH/ reviewed - no change compared to H&P  ________________________________________________________________________  Care Plan discussed with:    Comments   Patient y    Family      RN y    Care Manager     Consultant                        Multidiciplinary team rounds were held today with , nursing, pharmacist and clinical coordinator. Patient's plan of care was discussed; medications were reviewed and discharge planning was addressed.      ________________________________________________________________________  Total NON critical care TIME: 25  Minutes    Total CRITICAL CARE TIME Spent: Minutes non procedure based      Comments   >50% of visit spent in counseling and coordination of care     ________________________________________________________________________  Eleno Vasques MD     Procedures: see electronic medical records for all procedures/Xrays and details which were not copied into this note but were reviewed prior to creation of Plan. LABS:  I reviewed today's most current labs and imaging studies.   Pertinent labs include:  Recent Labs      06/04/17 0220 06/02/17   0335   WBC  6.7  11.4*   HGB  7.5*  8.0*   HCT  22.3*  23.9*   PLT  115*  128*     Recent Labs      06/04/17 0220 06/03/17   1545  06/03/17   0516  06/02/17   0335   NA  136   --   129*  131*   K  3.3*   --   3.8  3.7   CL  97   --   95*  95*   CO2  33*   --   23  27   GLU  78   --   75  80   BUN  19   --   44*  24*   CREA  5.97*   --   10.80*  8.07*   CA  8.5   --   8.6  8.6   MG   --    --    --   2.0   PHOS   --    --    --   1.8*   ALB   --    --    --   2.9*   TBILI   --    --    --   0.6   SGOT   --    --    --   15   ALT   --    --    --   13   INR  1.9*  2.1*   --    --        Signed: Eleno Vasques MD

## 2017-06-04 NOTE — PROGRESS NOTES
Pharmacy Daily Dosing of Warfarin    Indication (A Fib, CVA, DVT, PE, Ortho Surgery, Heart Valve): h/o recurrent DVT? Goal INR (2-3, 2.5-3.5, 3-4): 2-3      Average Daily Warfarin Dose: Warfarin 2.5 mg PO daily  Concurrent Anticoagulants/Antiplatelets: Plavix 75 mg daily + ASA 81 mg daily  Major Interacting Medications (Dose/Frequency): Metronidazole (discontinued today)    INR (0.9-1.1) > 5 or Platelets (< 65J): discuss with MD:  Recent Labs      06/04/17   0220  06/03/17   1545  06/02/17   0335  06/01/17   1359   INR  1.9*  2.1*   --   3.4*   HGB  7.5*   --   8.0*   --    PLT  115*   --   128*   --        Impression/Plan:   - Will give home dose of warfarin 3 mg tonight  - Daily INR on order     Pharmacy will follow daily and adjust the dose as appropriate.     Thanks for the consult  Violetta Cameron, CHIQUITAD

## 2017-06-04 NOTE — ROUTINE PROCESS
Bedside and Verbal shift change report given to Minoo Stevenson RN (oncoming nurse) by Elizabet Jimenez RN  (offgoing nurse).  Report included the following information SBAR, Kardex and Recent Results.      Zone Phone: [phone]          Significant changes during shift: None              Patient Information      Anup Hills  46 y.o.  6/1/2017 9:10 AM by Jojo Correa MD. Anup Hills was admitted from [facility]  Problem List      [address]   Patient Active Problem List      Diagnosis Date Noted    Severe sepsis ([address] 06/01/2017    Acute encephalopathy 09/26/2014    Hypoglycemia 09/16/2014    Congestive heart failure, unspecified 09/11/2014    Encephalopathy acute 09/07/2014    Pulmonary edema 09/07/2014    Hyperkalemia 10/06/2013    ESRD (end stage renal disease) on dialysis ([address] 05/14/2013    Dialysis patient ([address] 05/14/2013    Abdominal pain, other specified site 05/14/2013    S/p cadaver renal transplant 05/23/2012    AICD (automatic cardioverter/defibrillator) present 05/23/2012    Arterial occlusion ([address] 02/19/2012    Paronychia of great toe, left 01/19/2012    Acute renal failure ([address] 01/13/2012    Abscess of abdominal cavity ([address] 12/18/2011    Peritonitis ([address] 12/06/2011    Mesenteric hematoma 12/06/2011    Malnutrition ([address] 12/06/2011    DVT (deep venous thrombosis) ([address] 12/06/2011    S/P IVC filter 12/06/2011    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[address] 11/25/2011    HTN (hypertension) 11/06/2011    Anemia 11/06/2011    CHRISTINE (acute kidney injury) ([address] 11/06/2011    S/P appendectomy 11/06/2011    SBO (small bowel obstruction) ([address] 11/06/2011    High cholesterol       Other ill-defined conditions       CAD (coronary artery disease)       Gastrointestinal disorder       Abdominal pain 11/02/2011    Dehydration 07/13/2011    Nausea & vomiting 07/13/2011    Abdominal pain, acute, epigastric 07/13/2011    Serum total bilirubin elevated 07/13/2011    Acute pancreatitis 07/12/2011    Kidney transplant 07/12/2011    Coronary atherosclerosis of native coronary artery 07/12/2011                  Past Medical History:   Diagnosis Date    Abdominal hematoma       AICD (automatic cardioverter/defibrillator) present       CAD (coronary artery disease)         anterior MI s/p 2 stents 2007    Chronic abdominal pain       Chronic kidney disease         ESRD; Dialysis dependent.  DVT (deep venous thrombosis) ([facility] 2001    DVT of popliteal vein ([facility] 11/2011      not anticoagulated due to bleeding, IVC filter    Gallstone pancreatitis       Gastrointestinal disorder         acid reflux    Gastrointestinal disorder         peptic ulcer    Hemodialysis patient ([facility]       High cholesterol       Hypertension       Kidney transplant         b/l kidneys 1995, 2001    Nephrotic syndrome       Other ill-defined conditions         kidny transplant x2, on dialysis    Other ill-defined conditions         home dialysis    Other ill-defined conditions         hx recurrent left leg DVT    Peritonitis (HCC)       Seizures (HCC)       Small bowel obstruction (HCC)       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HCC)         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 No  Sitter?  No      Plan for upcoming shift: None              Discharge Plan: No       Active Consults:  IP CONSULT TO GENERAL SURGERY  IP CONSULT TO NEPHROLOGY

## 2017-06-04 NOTE — ROUTINE PROCESS
Bedside and Verbal shift change report given to Lorri (oncoming nurse) by Anupama Wesley RN  (offgoing nurse).  Report included the following information SBAR, Kardex and Recent Results.      Zone Phone: [phone]          Significant changes during shift:  Medicated q 4 hr for pain              Patient Information      Alison Franco  46 y.o.  6/1/2017 9:10 AM by Sylvester Starr MD. Alison Franco was admitted from [facility]  Problem List      [address]   Patient Active Problem List      Diagnosis Date Noted    Severe sepsis ([address] 06/01/2017    Acute encephalopathy 09/26/2014    Hypoglycemia 09/16/2014    Congestive heart failure, unspecified 09/11/2014    Encephalopathy acute 09/07/2014    Pulmonary edema 09/07/2014    Hyperkalemia 10/06/2013    ESRD (end stage renal disease) on dialysis ([address] 05/14/2013    Dialysis patient ([address] 05/14/2013    Abdominal pain, other specified site 05/14/2013    S/p cadaver renal transplant 05/23/2012    AICD (automatic cardioverter/defibrillator) present 05/23/2012    Arterial occlusion ([address] 02/19/2012    Paronychia of great toe, left 01/19/2012    Acute renal failure ([address] 01/13/2012    Abscess of abdominal cavity ([address] 12/18/2011    Peritonitis ([address] 12/06/2011    Mesenteric hematoma 12/06/2011    Malnutrition ([address] 12/06/2011    DVT (deep venous thrombosis) ([address] 12/06/2011    S/P IVC filter 12/06/2011    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[address] 11/25/2011    HTN (hypertension) 11/06/2011    Anemia 11/06/2011    CHRISTINE (acute kidney injury) ([address] 11/06/2011    S/P appendectomy 11/06/2011    SBO (small bowel obstruction) ([address] 11/06/2011    High cholesterol       Other ill-defined conditions       CAD (coronary artery disease)       Gastrointestinal disorder       Abdominal pain 11/02/2011    Dehydration 07/13/2011    Nausea & vomiting 07/13/2011    Abdominal pain, acute, epigastric 07/13/2011    Serum total bilirubin elevated 07/13/2011    Acute pancreatitis 07/12/2011    Kidney transplant 07/12/2011    Coronary atherosclerosis of native coronary artery 07/12/2011                  Past Medical History:   Diagnosis Date    Abdominal hematoma       AICD (automatic cardioverter/defibrillator) present       CAD (coronary artery disease)         anterior MI s/p 2 stents 2007    Chronic abdominal pain       Chronic kidney disease         ESRD; Dialysis dependent.  DVT (deep venous thrombosis) ([facility] 2001    DVT of popliteal vein ([facility] 11/2011      not anticoagulated due to bleeding, IVC filter    Gallstone pancreatitis       Gastrointestinal disorder         acid reflux    Gastrointestinal disorder         peptic ulcer    Hemodialysis patient ([facility]       High cholesterol       Hypertension       Kidney transplant         b/l kidneys 1995, 2001    Nephrotic syndrome       Other ill-defined conditions         kidny transplant x2, on dialysis    Other ill-defined conditions         home dialysis    Other ill-defined conditions         hx recurrent left leg DVT    Peritonitis (HCC)       Seizures (HCC)       Small bowel obstruction (HCC)       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HCC)         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 No  Sitter?  No      Plan for upcoming shift:  Monitor lab results , record pain relief if any              Discharge Plan: No       Active Consults:  IP CONSULT TO GENERAL SURGERY  IP CONSULT TO NEPHROLOGY

## 2017-06-05 ENCOUNTER — HOME HEALTH ADMISSION (OUTPATIENT)
Dept: HOME HEALTH SERVICES | Facility: HOME HEALTH | Age: 42
End: 2017-06-05

## 2017-06-05 LAB
INR PPP: 1.8 (ref 0.9–1.1)
PROTHROMBIN TIME: 18.6 SEC (ref 9–11.1)

## 2017-06-05 PROCEDURE — 74011250637 HC RX REV CODE- 250/637: Performed by: INTERNAL MEDICINE

## 2017-06-05 PROCEDURE — 74011250636 HC RX REV CODE- 250/636: Performed by: HOSPITALIST

## 2017-06-05 PROCEDURE — 86644 CMV ANTIBODY: CPT | Performed by: INTERNAL MEDICINE

## 2017-06-05 PROCEDURE — 74011250637 HC RX REV CODE- 250/637: Performed by: HOSPITALIST

## 2017-06-05 PROCEDURE — 65660000000 HC RM CCU STEPDOWN

## 2017-06-05 PROCEDURE — 86900 BLOOD TYPING SEROLOGIC ABO: CPT | Performed by: INTERNAL MEDICINE

## 2017-06-05 PROCEDURE — 85610 PROTHROMBIN TIME: CPT | Performed by: HOSPITALIST

## 2017-06-05 PROCEDURE — 93990 DOPPLER FLOW TESTING: CPT

## 2017-06-05 PROCEDURE — 36415 COLL VENOUS BLD VENIPUNCTURE: CPT | Performed by: HOSPITALIST

## 2017-06-05 PROCEDURE — 74011250636 HC RX REV CODE- 250/636: Performed by: INTERNAL MEDICINE

## 2017-06-05 PROCEDURE — 36430 TRANSFUSION BLD/BLD COMPNT: CPT

## 2017-06-05 PROCEDURE — 30233N1 TRANSFUSION OF NONAUTOLOGOUS RED BLOOD CELLS INTO PERIPHERAL VEIN, PERCUTANEOUS APPROACH: ICD-10-PCS | Performed by: INTERNAL MEDICINE

## 2017-06-05 PROCEDURE — 87340 HEPATITIS B SURFACE AG IA: CPT | Performed by: INTERNAL MEDICINE

## 2017-06-05 PROCEDURE — 86920 COMPATIBILITY TEST SPIN: CPT | Performed by: INTERNAL MEDICINE

## 2017-06-05 PROCEDURE — 86706 HEP B SURFACE ANTIBODY: CPT | Performed by: INTERNAL MEDICINE

## 2017-06-05 PROCEDURE — P9016 RBC LEUKOCYTES REDUCED: HCPCS | Performed by: INTERNAL MEDICINE

## 2017-06-05 PROCEDURE — 90935 HEMODIALYSIS ONE EVALUATION: CPT

## 2017-06-05 RX ORDER — WARFARIN 2 MG/1
4 TABLET ORAL ONCE
Status: COMPLETED | OUTPATIENT
Start: 2017-06-05 | End: 2017-06-05

## 2017-06-05 RX ORDER — SODIUM CHLORIDE 9 MG/ML
250 INJECTION, SOLUTION INTRAVENOUS AS NEEDED
Status: DISCONTINUED | OUTPATIENT
Start: 2017-06-05 | End: 2017-06-06 | Stop reason: HOSPADM

## 2017-06-05 RX ORDER — DIPHENHYDRAMINE HCL 25 MG
25 CAPSULE ORAL ONCE
Status: COMPLETED | OUTPATIENT
Start: 2017-06-05 | End: 2017-06-05

## 2017-06-05 RX ADMIN — Medication 10 ML: at 22:13

## 2017-06-05 RX ADMIN — LACOSAMIDE 50 MG: 100 TABLET, FILM COATED ORAL at 17:41

## 2017-06-05 RX ADMIN — ASPIRIN 81 MG CHEWABLE TABLET 81 MG: 81 TABLET CHEWABLE at 10:02

## 2017-06-05 RX ADMIN — Medication 10 ML: at 02:38

## 2017-06-05 RX ADMIN — FENTANYL CITRATE 25 MCG: 50 INJECTION, SOLUTION INTRAMUSCULAR; INTRAVENOUS at 02:26

## 2017-06-05 RX ADMIN — WARFARIN SODIUM 4 MG: 2 TABLET ORAL at 17:41

## 2017-06-05 RX ADMIN — LEVETIRACETAM 750 MG: 250 TABLET, FILM COATED ORAL at 10:03

## 2017-06-05 RX ADMIN — FENTANYL CITRATE 25 MCG: 50 INJECTION, SOLUTION INTRAMUSCULAR; INTRAVENOUS at 15:07

## 2017-06-05 RX ADMIN — LACOSAMIDE 50 MG: 100 TABLET, FILM COATED ORAL at 10:04

## 2017-06-05 RX ADMIN — PANTOPRAZOLE SODIUM 40 MG: 40 TABLET, DELAYED RELEASE ORAL at 07:39

## 2017-06-05 RX ADMIN — Medication 10 ML: at 15:07

## 2017-06-05 RX ADMIN — OXYCODONE HYDROCHLORIDE AND ACETAMINOPHEN 1 TABLET: 5; 325 TABLET ORAL at 17:41

## 2017-06-05 RX ADMIN — FENTANYL CITRATE 25 MCG: 50 INJECTION, SOLUTION INTRAMUSCULAR; INTRAVENOUS at 10:34

## 2017-06-05 RX ADMIN — FENTANYL CITRATE 25 MCG: 50 INJECTION, SOLUTION INTRAMUSCULAR; INTRAVENOUS at 19:02

## 2017-06-05 RX ADMIN — LEVETIRACETAM 750 MG: 250 TABLET, FILM COATED ORAL at 17:42

## 2017-06-05 RX ADMIN — OXYCODONE HYDROCHLORIDE AND ACETAMINOPHEN 1 TABLET: 5; 325 TABLET ORAL at 11:37

## 2017-06-05 RX ADMIN — DIPHENHYDRAMINE HYDROCHLORIDE 25 MG: 25 CAPSULE ORAL at 10:03

## 2017-06-05 RX ADMIN — FENTANYL CITRATE 25 MCG: 50 INJECTION, SOLUTION INTRAMUSCULAR; INTRAVENOUS at 06:27

## 2017-06-05 RX ADMIN — FENTANYL CITRATE 25 MCG: 50 INJECTION, SOLUTION INTRAMUSCULAR; INTRAVENOUS at 22:57

## 2017-06-05 RX ADMIN — CLOPIDOGREL BISULFATE 75 MG: 75 TABLET ORAL at 10:04

## 2017-06-05 RX ADMIN — OXYCODONE HYDROCHLORIDE AND ACETAMINOPHEN 1 TABLET: 5; 325 TABLET ORAL at 00:28

## 2017-06-05 RX ADMIN — ERYTHROPOIETIN 10000 UNITS: 10000 INJECTION, SOLUTION INTRAVENOUS; SUBCUTANEOUS at 22:13

## 2017-06-05 RX ADMIN — CALCITRIOL 0.5 MCG: 0.25 CAPSULE, LIQUID FILLED ORAL at 10:02

## 2017-06-05 NOTE — PROGRESS NOTES
Nephrology Progress Note     Blaise Leblanc       [email or web address]. [email or web address]                   Phone - [phone]   Patient: Faizan Mera  YOB: 1975     Date- 6/5/2017     CC: Follow up for esrd          Subjective: Interval History:   -  S/p hd saturday  He has positive blood cx - MSSA  bp stable  No c/o sob, fever. No c/o nausea or vomiting  No c/o chest pain     Assessment:   · End Stage Renal Disease - home hemo. F/b DR. HANSEN  · MSSA sepsis- likely source button holes - TTE negative, no fluid collection around avf  · Anemia of CKD  · Secondary hyperparathyroidism of renal origin  · Sepsis  · fever     Plan:   Dialysis today  Ancef 1.5 gram q hd- 5 days/week at home   epogen with hd. Continue Po calcitriol  Give 2 unit prbc tx today  If repeat cultures negative - he could go home tomorrow. We will need  help to set up out pt antibiotics  Care Plan discussed with: pt and DR. Ayala  Review of Systems: Pertinent items are noted in HPI. Objective:   Vitals:    06/04/17 2005 06/05/17 0027 06/05/17 0322 06/05/17 0743   BP: 118/74 122/88 122/83 122/85   Pulse: 85 81 78 81   Resp: 16 16 16 16   Temp: 98.4 °F (36.9 °C) 98.3 °F (36.8 °C) 98.5 °F (36.9 °C) 98.1 °F (36.7 °C)   SpO2: 99% 97% 100% 98%   Weight:  62.8 kg (138 lb 7.2 oz)     Height:           Intake/Output Summary (Last 24 hours) at 06/05/17 0923  Last data filed at 06/05/17 0239   Gross per 24 hour   Intake              120 ml   Output                0 ml   Net              120 ml     Physical Exam:   GEN: NAD  NECK- Supple, no thyromegaly  RESP: Clear b/l, no wheezing, No accessory muscle use  CVS: RRR,S1,S2   NEURO: non focal, normal speech  SKIN: No Rash, No Jaundice  ABDO: soft , non tender, No hepatosplenomegaly  EXT: No Edema , right arm avf + no red ness     Chart reviewed. Pertinent Notes reviewed.      Medications list  reviewed             Data Review :  Microbiology:  Lab Results   Component Value Date/Time    Culture result: NO GROWTH 2 DAYS 06/03/2017 05:16 AM    Culture result:  06/01/2017 09:46 AM     STAPHYLOCOCCUS AUREUS  ISOLATED FROM ALL 4 BOTTLES DRAWN. ..LAC AND LAC 1010 SITES      Culture result:  06/01/2017 09:46 AM     PRELIMINARY REPORT OF  GRAM POSITIVE COCCI  IN CLUSTERS  GROWING IN 2 OF 4 BOTTLES DRAWN  CALLED TO AND READ BACK BY  MS ALFREDA ADEN RN ON 6/1/17 AT [address] [facility], [facility] [address]). MS       Lab Results   Component Value Date/Time    Specimen Description: NARES 05/30/2013 05:53 PM    Specimen Description: URINE 11/16/2012 05:15 PM    Specimen Description: URINE 09/17/2012 01:35 AM       Recent Labs      06/05/17   0236  06/04/17   0220  06/03/17   1545  06/03/17   0516   NA   --   136   --   129*   K   --   3.3*   --   3.8   CL   --   97   --   95*   CO2   --   33*   --   23   GLU   --   78   --   75   BUN   --   19   --   44*   CREA   --   5.97*   --   10.80*   CA   --   8.5   --   8.6   INR  1.8*  1.9*  2.1*   --      Recent Labs      06/04/17   0220   WBC  6.7   HGB  7.5*   HCT  22.3*   PLT  115*     Current Facility-Administered Medications   Medication    diphenhydrAMINE (BENADRYL) capsule 25 mg    ceFAZolin (ANCEF) 2g in 100 ml 0.9% NS IVPB    ceFAZolin (ANCEF) 3 g in 0.9%  ml IVPB    clopidogrel (PLAVIX) tablet 75 mg    WARFARIN INFORMATION NOTE (COUMADIN)    sodium chloride (NS) flush 5-10 mL    sodium chloride (NS) flush 5-10 mL    prochlorperazine (COMPAZINE) injection 5 mg    calcitRIOL (ROCALTROL) capsule 0.5 mcg    pantoprazole (PROTONIX) tablet 40 mg    lacosamide (VIMPAT) tablet 50 mg    levETIRAcetam (KEPPRA) tablet 750 mg    aspirin chewable tablet 81 mg    fentaNYL citrate (PF) injection 25 mcg    oxyCODONE-acetaminophen (PERCOCET) 5-325 mg per tablet 1 Tab    acetaminophen (TYLENOL) tablet 650 mg       MD Shahzad [facility]  [email or web address]  [address], [address]  [place], [address]  Phone - [phone]         Fax - [phone]  [facility] Office  [address]  Phone - [phone]        Fax - [phone]

## 2017-06-05 NOTE — PROGRESS NOTES
[facility]  Preliminary Report:  Hemodialysis Graft Duplex - right    An hemodialysis duplex scan of the right arm was performed with the following findings:    Vessel    Velocity (cm/s)        Functioning dialysis graft noted in right arm. A stent is visualized in AVF with a possible pseudoaneurysm measuring 3.3 cm x  1.4 cm. Other findings:     Final report to follow.

## 2017-06-05 NOTE — PROGRESS NOTES
Bedside and Verbal shift change report given to Kinsey Rodriguez RN (oncoming nurse) by Masha Garrett RN (offgoing nurse).  Report included the following information SBAR, Kardex and Recent Results.      Zone Phone: [phone]          Significant changes during shift: None              Patient Information      Federico Camarena  46 y.o.  6/1/2017 9:10 AM by Flaco Andrade MD. Federico Camarena was admitted from [facility]  Problem List      [address]   Patient Active Problem List      Diagnosis Date Noted    Severe sepsis ([address] 06/01/2017    Acute encephalopathy 09/26/2014    Hypoglycemia 09/16/2014    Congestive heart failure, unspecified 09/11/2014    Encephalopathy acute 09/07/2014    Pulmonary edema 09/07/2014    Hyperkalemia 10/06/2013    ESRD (end stage renal disease) on dialysis ([address] 05/14/2013    Dialysis patient ([address] 05/14/2013    Abdominal pain, other specified site 05/14/2013    S/p cadaver renal transplant 05/23/2012    AICD (automatic cardioverter/defibrillator) present 05/23/2012    Arterial occlusion ([address] 02/19/2012    Paronychia of great toe, left 01/19/2012    Acute renal failure ([address] 01/13/2012    Abscess of abdominal cavity ([address] 12/18/2011    Peritonitis ([address] 12/06/2011    Mesenteric hematoma 12/06/2011    Malnutrition ([address] 12/06/2011    DVT (deep venous thrombosis) ([address] 12/06/2011    S/P IVC filter 12/06/2011    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[address] 11/25/2011    HTN (hypertension) 11/06/2011    Anemia 11/06/2011    CHRISTINE (acute kidney injury) ([address] 11/06/2011    S/P appendectomy 11/06/2011    SBO (small bowel obstruction) ([address] 11/06/2011    High cholesterol       Other ill-defined conditions       CAD (coronary artery disease)       Gastrointestinal disorder       Abdominal pain 11/02/2011    Dehydration 07/13/2011    Nausea & vomiting 07/13/2011    Abdominal pain, acute, epigastric 07/13/2011    Serum total bilirubin elevated 07/13/2011    Acute pancreatitis 07/12/2011    Kidney transplant 07/12/2011    Coronary atherosclerosis of native coronary artery 07/12/2011                  Past Medical History:   Diagnosis Date    Abdominal hematoma       AICD (automatic cardioverter/defibrillator) present       CAD (coronary artery disease)         anterior MI s/p 2 stents 2007    Chronic abdominal pain       Chronic kidney disease         ESRD; Dialysis dependent.  DVT (deep venous thrombosis) ([facility] 2001    DVT of popliteal vein ([facility] 11/2011      not anticoagulated due to bleeding, IVC filter    Gallstone pancreatitis       Gastrointestinal disorder         acid reflux    Gastrointestinal disorder         peptic ulcer    Hemodialysis patient ([facility]       High cholesterol       Hypertension       Kidney transplant         b/l kidneys 1995, 2001    Nephrotic syndrome       Other ill-defined conditions         kidny transplant x2, on dialysis    Other ill-defined conditions         home dialysis    Other ill-defined conditions         hx recurrent left leg DVT    Peritonitis (HCC)       Seizures (HCC)       Small bowel obstruction (HCC)       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HCC)         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 No  Sitter?  No      Plan for upcoming shift: None              Discharge Plan: No       Active Consults:  IP CONSULT TO GENERAL SURGERY  IP CONSULT TO NEPHROLOGY

## 2017-06-05 NOTE — DIALYSIS
[facility]                         [phone]  Vitals Pre Post Assessment Pre Post   /85 121/79 LOC A&Ox 3 and following commands A&Ox 3 and following commands   HR 83 76 Lungs Clear bilaterally Clear bilaterally   Temp 99.3 98.4 Cardiac Denies SOB, pulses palpable, heart sounds auscultated  Denies SOB, pulses palpable, heart sounds auscultated    Resp 16 16 Skin Warm, dry and intact `Warm, dry and intact   Weight 63.9kg 62.7kg Edema None noted None noted      Pain 5/10 Denies     Orders   Duration: Start: 1150 End: 1550 Total: 4 hours   Dialyzer: Revaclear   K Bath: 4   Ca Bath: 2.5   Na / Bicarb: 140/40   Target Fluid Removal: 1 kg     Access   Type & Location: KUSHAL AVF   Comments:                            +thrill, bruit, flashes, aspiration, NS flushes. No redness or drainage noted to area     Labs   Hep B status / date: Unknown   Obtained/Reviewed  Critical Results Called Hep B panel drawn  N/A     Meds Given   Name Dose Route   None ordered                 Total Liters Process: 100.3   Net Fluid Removed: 1 kg      Comments   Time Out Done: Yes: Annemarie Naranjo RN   Primary Nurse Rpt Pre: Anuel Treviño RN   Primary Nurse Rpt Post: Anuel Treviño RN   Pt Education: S&S of infection    Care Plan: Continue tx as ordered   Tx Summary: Pt tolerated tx well. At the end blood in circuit returned with 300ml of NS. Needles removed x 2. Pressure held until no bleeding noted. Dressing secured well. Pt remains in 3119. Bed in lowest position, call bell within reach.

## 2017-06-05 NOTE — PROGRESS NOTES
Hospitalist Progress Note    NAME: Omega Husain   :  1975   MRN:  [id number]       Interim Hospital Summary: 46 y.o. male whom presented on 2017 with      Assessment / Plan:  Severe Sepsis POA   Due to MSSA bacteremia in settings of ESRD on HD at home  Repeat Cultures negative till date  Repeat 2D echo without obvious vegetation and with no significant change in valvular anomalies from the 2D echo from . EF is actually somewhat better   Will continue IV Ancef with HD  I have spoke to Nephrology and CM, Case Management to arrange IV Ancef 1.5gm after every HD as an OP with his hemodialysis (he does 5 times a week at home), per his HD center, he will need to come to HD center to receive abx and he agrees   Duplex of HD access negative for abscess  Chest xray neg    Abdominal Pain - Resolved  H/o multiple abdominal surgeries  Sepsis related  Seen by surgery -- for abdominal fluid collections ( which was improved from prior CT) and with significant surgical historyno need for intervention    ESRD on HD at home  Nephrology following  hypokalemia treated     CAD s/p PCI LAD with bare metal stent  [address] EF 15% s/p AICD, now EF 30%  Continue aspirin. Resumed plavix,, as no plans for surgery     Hx recurrent left leg DVT  S/p left leg bypass   Resume coumadin    Hx heparin induced thrombocytopenia  No heparin products     Hx seizure d/o  Continue keppra and vimpat     Hx nephrotic syndrome causing renal failure  Hx failed cadaveric renal transplants x 2     Code: full  DVT prophylaxis: coumadin  Surrogate decision maker: sister      Body mass index is 21.68 kg/(m^2). Subjective: Pt seen and examined at bedside. NAD. Abdominal pain resolved.  Overnight events d/w RN     Chief Complaint / Reason for Physician Visit: f/u \"Sepsis\"     Review of Systems:  Symptom Y/N Comments  Symptom Y/N Comments   Fever/Chills n   Chest Pain n    Poor Appetite    Edema     Cough n   Abdominal Pain n    Sputum Joint Pain     SOB/ZAMUDIO    Pruritis/Rash     Nausea/vomit n   Tolerating PT/OT     Diarrhea    Tolerating Diet y    Constipation    Other       Could NOT obtain due to:      Objective:     VITALS:   Last 24hrs VS reviewed since prior progress note. Most recent are:  Patient Vitals for the past 24 hrs:   Temp Pulse Resp BP SpO2   06/05/17 1830 99.4 °F (37.4 °C) 81 18 129/80 96 %   06/05/17 1755 98.2 °F (36.8 °C) 74 18 117/74 99 %   06/05/17 1732 99.6 °F (37.6 °C) 75 18 140/84 98 %   06/05/17 1606 98.3 °F (36.8 °C) 71 18 127/79 99 %   06/05/17 1550 - 72 16 129/82 -   06/05/17 1530 - 72 18 129/82 -   06/05/17 1500 - 72 16 127/81 -   06/05/17 1430 - 75 16 124/81 -   06/05/17 1400 - 78 16 (!) 136/91 -   06/05/17 1330 - 80 16 127/80 -   06/05/17 1300 - 68 16 124/80 -   06/05/17 1230 - 72 18 123/82 -   06/05/17 1213 99.3 °F (37.4 °C) 80 16 122/83 96 %   06/05/17 1150 - 74 16 120/85 -   06/05/17 1130 99.3 °F (37.4 °C) 83 16 125/85 -   06/05/17 0743 98.1 °F (36.7 °C) 81 16 122/85 98 %   06/05/17 0322 98.5 °F (36.9 °C) 78 16 122/83 100 %   06/05/17 0027 98.3 °F (36.8 °C) 81 16 122/88 97 %   06/04/17 2005 98.4 °F (36.9 °C) 85 16 118/74 99 %       Intake/Output Summary (Last 24 hours) at 06/05/17 1854  Last data filed at 06/05/17 1550   Gross per 24 hour   Intake              120 ml   Output             1000 ml   Net             -880 ml        PHYSICAL EXAM:  General: WD, WN. Alert, cooperative,  no acute distress    EENT:  EOMI. Anicteric sclerae. MMM  Resp:  B/l air entry. No accessory muscle use  CV:  Regular  rhythm,  No edema  GI:  Soft, non distended.  +Bowel sounds  Neurologic:  Alert and oriented X 3, normal speech,   Psych:   Good insight. Not anxious nor agitated  Skin:  No rashes.   No jaundice, left arm fistula no redness, no tenderness    Reviewed most current lab test results and cultures  YES  Reviewed most current radiology test results   YES  Review and summation of old records today    NO  Reviewed patient's current orders and MAR    YES  PMH/SH reviewed - no change compared to H&P  ________________________________________________________________________  Care Plan discussed with:    Comments   Patient y    Family      RN y    Care Manager y    Consultant  y nephrology                     Multidiciplinary team rounds were held today with , nursing, pharmacist and clinical coordinator. Patient's plan of care was discussed; medications were reviewed and discharge planning was addressed. ________________________________________________________________________  Total NON critical care TIME: 35  Minutes    Total CRITICAL CARE TIME Spent:   Minutes non procedure based      Comments   >50% of visit spent in counseling and coordination of care x Coordination of care with Care management and nephrology for OP abx   ________________________________________________________________________  Gil Boeck, MD     Procedures: see electronic medical records for all procedures/Xrays and details which were not copied into this note but were reviewed prior to creation of Plan. LABS:  I reviewed today's most current labs and imaging studies.   Pertinent labs include:  Recent Labs      06/04/17 0220   WBC  6.7   HGB  7.5*   HCT  22.3*   PLT  115*     Recent Labs      06/05/17   0236  06/04/17   0220  06/03/17   1545  06/03/17   0516   NA   --   136   --   129*   K   --   3.3*   --   3.8   CL   --   97   --   95*   CO2   --   33*   --   23   GLU   --   78   --   75   BUN   --   19   --   44*   CREA   --   5.97*   --   10.80*   CA   --   8.5   --   8.6   INR  1.8*  1.9*  2.1*   --        Signed: Gil Boeck, MD

## 2017-06-05 NOTE — PROGRESS NOTES
Bedside and Verbal shift change report given to Zeina Mcwilliams RN (oncoming nurse) by Kalia Cisse RN (offgoing nurse).  Report included the following information SBAR, Kardex and Recent Results.      Zone Phone: [phone]          Significant changes during shift: transfused 1 unit of PRBC; pain continues in abdomen; hemodialysis performed               Patient Information      Alison Franco  46 y.o.  6/1/2017 9:10 AM by Sylvester Starr MD. Alison Franco was admitted from [facility]  Problem List      [address]   Patient Active Problem List      Diagnosis Date Noted    Severe sepsis ([address] 06/01/2017    Acute encephalopathy 09/26/2014    Hypoglycemia 09/16/2014    Congestive heart failure, unspecified 09/11/2014    Encephalopathy acute 09/07/2014    Pulmonary edema 09/07/2014    Hyperkalemia 10/06/2013    ESRD (end stage renal disease) on dialysis ([address] 05/14/2013    Dialysis patient ([address] 05/14/2013    Abdominal pain, other specified site 05/14/2013    S/p cadaver renal transplant 05/23/2012    AICD (automatic cardioverter/defibrillator) present 05/23/2012    Arterial occlusion ([address] 02/19/2012    Paronychia of great toe, left 01/19/2012    Acute renal failure ([address] 01/13/2012    Abscess of abdominal cavity ([address] 12/18/2011    Peritonitis ([address] 12/06/2011    Mesenteric hematoma 12/06/2011    Malnutrition ([address] 12/06/2011    DVT (deep venous thrombosis) ([address] 12/06/2011    S/P IVC filter 12/06/2011    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[address] 11/25/2011    HTN (hypertension) 11/06/2011    Anemia 11/06/2011    CHRISTINE (acute kidney injury) ([address] 11/06/2011    S/P appendectomy 11/06/2011    SBO (small bowel obstruction) ([address] 11/06/2011    High cholesterol       Other ill-defined conditions       CAD (coronary artery disease)       Gastrointestinal disorder       Abdominal pain 11/02/2011    Dehydration 07/13/2011    Nausea & vomiting 07/13/2011    Abdominal pain, acute, epigastric 07/13/2011    Serum total bilirubin elevated 07/13/2011    Acute pancreatitis 07/12/2011    Kidney transplant 07/12/2011    Coronary atherosclerosis of native coronary artery 07/12/2011                  Past Medical History:   Diagnosis Date    Abdominal hematoma       AICD (automatic cardioverter/defibrillator) present       CAD (coronary artery disease)         anterior MI s/p 2 stents 2007    Chronic abdominal pain       Chronic kidney disease         ESRD; Dialysis dependent.  DVT (deep venous thrombosis) ([facility] 2001    DVT of popliteal vein ([facility] 11/2011      not anticoagulated due to bleeding, IVC filter    Gallstone pancreatitis       Gastrointestinal disorder         acid reflux    Gastrointestinal disorder         peptic ulcer    Hemodialysis patient ([facility]       High cholesterol       Hypertension       Kidney transplant         b/l kidneys 1995, 2001    Nephrotic syndrome       Other ill-defined conditions         kidny transplant x2, on dialysis    Other ill-defined conditions         home dialysis    Other ill-defined conditions         hx recurrent left leg DVT    Peritonitis (HCC)       Seizures (HCC)       Small bowel obstruction (HCC)       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HCC)         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 No  Sitter?  No      Plan for upcoming shift: monitor H&H              Discharge Plan: Yes, tomorrow      Active Consults:  IP CONSULT TO GENERAL SURGERY  IP CONSULT TO NEPHROLOGY

## 2017-06-05 NOTE — PROCEDURES
[facility]  *** FINAL REPORT ***    Name: Zeinab Pepe  MRN: [id number]    Inpatient  : 28 Sep 1975  HIS Order #: [id number]  [id number] [facility] Visit #: [id number]  Date: 2017    TYPE OF TEST: Dialysis Access Duplex    REASON FOR TEST  Fistula infection    Graft:-  Summary:   Right arm fistula  Op. Date:  Surgeon:    Results:-            Velocity  Ratio  Flow Volume Stenosis            --------  -----  ----------- --------  Inflow:  Proximal:  Mid-graft:  Distal:  Outflow:                       N/A    Mean Flow Volume:    INTERPRETATION/FINDINGS  PROCEDURE:  Arterial duplex examination using B-mode, color flow and  spectral Doppler of the hemodialysis access and the associated inflow  and outflow vessels. 1.  Functioning right AVF with a stent visualized with a possible  pseudoaneurysm measuring 3.3cm x 1.4cm arising from the stent. 2.  No hematoma or other fluid collection noted. ADDITIONAL COMMENTS    I have personally reviewed the data relevant to the interpretation of  this  study. TECHNOLOGIST: Julius Parada RVT, PRAVEENMS  Signed: 2017 10:50 AM    PHYSICIAN: Kaci Jones MD  Signed: 2017 02:35 PM

## 2017-06-05 NOTE — PROGRESS NOTES
SURGERY PROGRESS NOTE      Admit Date: 2017     Subjective: Tolerating diet. Abdominal pain continues to improve. Objective:     Visit Vitals    /85    Pulse 81    Temp 98.1 °F (36.7 °C)    Resp 16    Ht 5' 7\" (1.702 m)    Wt 62.8 kg (138 lb 7.2 oz)    SpO2 98%    BMI 21.68 kg/m2        Temp (24hrs), Av.3 °F (36.8 °C), Min:98.1 °F (36.7 °C), Max:98.5 °F (36.9 °C)      Physical Exam:     Abdomen:  Soft. Minimal LLQ tenderness, no rebound or guarding. Non-distended. Lab Results  Component Value Date/Time   WBC 6.7 2017 02:20 AM   Hemoglobin (POC) 14.3 2014 02:38 PM   HGB 7.5 2017 02:20 AM   Hematocrit (POC) 42 2014 02:38 PM   HCT 22.3 2017 02:20 AM   PLATELET 096  02:20 AM   MCV 94.9 2017 02:20 AM       Lab Results  Component Value Date/Time   GFR est AA 13 2017 02:20 AM   GFR est non-AA 11 2017 02:20 AM   Creatinine (POC) 6.3 2014 02:38 PM   Creatinine 5.97 2017 02:20 AM   BUN 19 2017 02:20 AM   BUN (POC) 22 2014 02:38 PM   Sodium (POC) 135 2014 02:38 PM   Sodium 136 2017 02:20 AM   Potassium 3.3 2017 02:20 AM   Potassium (POC) 5.4 2014 02:38 PM   Chloride (POC) 98 2014 02:38 PM   Chloride 97 2017 02:20 AM   CO2 33 2017 02:20 AM        Assessment:     Principal Problem:    Severe sepsis ([facility] (2017)    Active Problems:    Abdominal pain (2011)      CAD (coronary artery disease) ()      ESRD (end stage renal disease) on dialysis ([facility] (2013)        Plan/Recommendations/Medical Decision Making:     Continue antibiotics. Clear for discharge from surgery standpoint.

## 2017-06-05 NOTE — PROGRESS NOTES
LINDA spoke with Kishan Ricardo with [facility] and the IV drug, supplies are all covered. Liane Longoria  is set up to assist pt with the IV's if needed. Kishan Ricardo with [facility] is coming this afternoon to meet with pt. LINDA met with pt and discussed the discharge plan and he understood.      Dulce Eng, [phone]

## 2017-06-05 NOTE — PROGRESS NOTES
CM consult noted and met with pt to discuss discharge plan of IV antibiotics at home. Pt has not received them before and pt verbalized he takes care of HD at home. Pt does his HD MTThuFrSun from 7am-9:35am. Pt doesn't have a preference on an IV company. CM will send referral to [place] via [employer].     Reynold Villagran, [address]

## 2017-06-05 NOTE — PROGRESS NOTES
Bedside shift change report given to Ross Jurado RN (oncoming nurse) by Santa Lamar RN (offgoing nurse). Report included the following information SBAR, Kardex, MAR and Recent Results.

## 2017-06-05 NOTE — PROGRESS NOTES
Pharmacy Daily Dosing of Warfarin    Indication (A Fib, CVA, DVT, PE, Ortho Surgery, Heart Valve): h/o recurrent DVT? Goal INR (2-3, 2.5-3.5, 3-4): 2-3      Average Daily Warfarin Dose: Warfarin 2.5 mg PO daily  Concurrent Anticoagulants/Antiplatelets: Plavix 75 mg daily + ASA 81 mg daily  Major Interacting Medications (Dose/Frequency): Metronidazole (discontinued yesterday)    INR (0.9-1.1) > 5 or Platelets (< 25Z): discuss with MD:  Recent Labs      06/05/17   0236  06/04/17   0220  06/03/17   1545   INR  1.8*  1.9*  2.1*   HGB   --   7.5*   --    PLT   --   115*   --        Impression/Plan:   - Will give warfarin 4 mg tonight  - Daily INR on order  - Order CBC     Pharmacy will follow daily and adjust the dose as appropriate.     Thanks for the consult  Conner Frost, PharmD  PGY-1 Pharmacy Resident

## 2017-06-05 NOTE — PROGRESS NOTES
Received update that Marcelina cannot give the IV antibiotic without a PICC line. CM informed Dr. Sydney Rhoades and he does not want to put a PICC line in. CM contacted [facility] and spoke with a nurse Americo Vital ([phone]) and see said they can provide the pt with the IV antibiotic. The pt would have to come to the center after his HD to receive it. CM met with pt and discussed the change in the discharge plan and pt was in agreement to go to the [facility], [address] for his IV antibiotic. Pt verbalized his sister can transport him there. CM informed Dr. Sydney Rhoades and CM faxed Americo Vital at [facility] the blood culture results that she requested. CM informed [phone] and Marcelina of the change in discharge plan.      Kike Levin, [address]

## 2017-06-06 VITALS
HEIGHT: 67 IN | RESPIRATION RATE: 18 BRPM | SYSTOLIC BLOOD PRESSURE: 134 MMHG | HEART RATE: 80 BPM | OXYGEN SATURATION: 99 % | WEIGHT: 138.2 LBS | TEMPERATURE: 99 F | BODY MASS INDEX: 21.69 KG/M2 | DIASTOLIC BLOOD PRESSURE: 84 MMHG

## 2017-06-06 LAB
ERYTHROCYTE [DISTWIDTH] IN BLOOD BY AUTOMATED COUNT: 14.2 % (ref 11.5–14.5)
HCT VFR BLD AUTO: 25.4 % (ref 36.6–50.3)
HGB BLD-MCNC: 8.8 G/DL (ref 12.1–17)
INR PPP: 2.1 (ref 0.9–1.1)
MCH RBC QN AUTO: 32.8 PG (ref 26–34)
MCHC RBC AUTO-ENTMCNC: 34.6 G/DL (ref 30–36.5)
MCV RBC AUTO: 94.8 FL (ref 80–99)
PLATELET # BLD AUTO: 138 K/UL (ref 150–400)
PROTHROMBIN TIME: 22.1 SEC (ref 9–11.1)
RBC # BLD AUTO: 2.68 M/UL (ref 4.1–5.7)
WBC # BLD AUTO: 6.1 K/UL (ref 4.1–11.1)

## 2017-06-06 PROCEDURE — 36415 COLL VENOUS BLD VENIPUNCTURE: CPT | Performed by: HOSPITALIST

## 2017-06-06 PROCEDURE — 74011250636 HC RX REV CODE- 250/636: Performed by: INTERNAL MEDICINE

## 2017-06-06 PROCEDURE — 74011250637 HC RX REV CODE- 250/637: Performed by: HOSPITALIST

## 2017-06-06 PROCEDURE — 85027 COMPLETE CBC AUTOMATED: CPT | Performed by: INTERNAL MEDICINE

## 2017-06-06 PROCEDURE — 74011250636 HC RX REV CODE- 250/636: Performed by: HOSPITALIST

## 2017-06-06 PROCEDURE — 85610 PROTHROMBIN TIME: CPT | Performed by: HOSPITALIST

## 2017-06-06 RX ORDER — SAME BUTANEDISULFONATE/BETAINE 400-600 MG
250 POWDER IN PACKET (EA) ORAL 2 TIMES DAILY
Qty: 60 CAP | Refills: 0 | Status: SHIPPED | OUTPATIENT
Start: 2017-06-06 | End: 2017-07-06

## 2017-06-06 RX ADMIN — CLOPIDOGREL BISULFATE 75 MG: 75 TABLET ORAL at 07:34

## 2017-06-06 RX ADMIN — FENTANYL CITRATE 25 MCG: 50 INJECTION, SOLUTION INTRAMUSCULAR; INTRAVENOUS at 11:26

## 2017-06-06 RX ADMIN — Medication 10 ML: at 11:27

## 2017-06-06 RX ADMIN — FENTANYL CITRATE 25 MCG: 50 INJECTION, SOLUTION INTRAMUSCULAR; INTRAVENOUS at 07:31

## 2017-06-06 RX ADMIN — PANTOPRAZOLE SODIUM 40 MG: 40 TABLET, DELAYED RELEASE ORAL at 06:48

## 2017-06-06 RX ADMIN — FENTANYL CITRATE 25 MCG: 50 INJECTION, SOLUTION INTRAMUSCULAR; INTRAVENOUS at 03:02

## 2017-06-06 RX ADMIN — CEFAZOLIN 1 G: 1 INJECTION, POWDER, FOR SOLUTION INTRAMUSCULAR; INTRAVENOUS; PARENTERAL at 13:13

## 2017-06-06 RX ADMIN — LEVETIRACETAM 750 MG: 250 TABLET, FILM COATED ORAL at 07:35

## 2017-06-06 RX ADMIN — LACOSAMIDE 50 MG: 100 TABLET, FILM COATED ORAL at 07:36

## 2017-06-06 RX ADMIN — FENTANYL CITRATE 25 MCG: 50 INJECTION, SOLUTION INTRAMUSCULAR; INTRAVENOUS at 15:25

## 2017-06-06 RX ADMIN — Medication 10 ML: at 06:48

## 2017-06-06 RX ADMIN — CALCITRIOL 0.5 MCG: 0.25 CAPSULE, LIQUID FILLED ORAL at 07:34

## 2017-06-06 RX ADMIN — OXYCODONE HYDROCHLORIDE AND ACETAMINOPHEN 1 TABLET: 5; 325 TABLET ORAL at 10:21

## 2017-06-06 RX ADMIN — ASPIRIN 81 MG CHEWABLE TABLET 81 MG: 81 TABLET CHEWABLE at 07:35

## 2017-06-06 NOTE — PROGRESS NOTES
Pharmacy Daily Dosing of Warfarin    Indication (A Fib, CVA, DVT, PE, Ortho Surgery, Heart Valve): h/o recurrent DVT? Goal INR (2-3, 2.5-3.5, 3-4): 2-3      Average Daily Warfarin Dose: Warfarin 2.5 mg PO daily (while on antibiotics) Usual home dose is 3.5 mg every day except Sunday. Takes 2.5 mg on Sunday. Concurrent Anticoagulants/Antiplatelets: Plavix 75 mg daily + ASA 81 mg daily  Major Interacting Medications (Dose/Frequency): None    INR (0.9-1.1) > 5 or Platelets (< 03S): discuss with MD:  Recent Labs      06/06/17   0321  06/05/17   0236  06/04/17   0220   INR  2.1*  1.8*  1.9*   HGB  8.8*   --   7.5*   PLT  138*   --   115*       Impression/Plan:   - Will give home warfarin dose 3.5 mg tonight  - Daily INR on order  - Order CBC     Pharmacy will follow daily and adjust the dose as appropriate.     Thanks for the consult  Conner Frost, PharmD  PGY-1 Pharmacy Resident

## 2017-06-06 NOTE — PROGRESS NOTES
Dr. Debbie Deutsch paged regarding patient's HGB of 8.8. Dr. Debbie Deutsch said that transfusion of second unit of PRBCs unneccesary. Nursing will continue to monitor.

## 2017-06-06 NOTE — PROGRESS NOTES
CM met with pt and discussed the discharge plan. Pt will receive the IV antibiotic at the dialysis center, [facility]. CM faxed the discharge summary to [facility] and spoke with Mark Irving. They will contact pt regarding schedule when he gets home. Pt wants to drive himself home at discharge. Pt received his 2nd  Medicare letter. Pt in good spirits and ready for discharge. Care Management Interventions  PCP Verified by CM: Yes (Dr. Mcghee Back - saw PCP 3 mths ago)  Mode of Transport at Discharge: Self (pt has a car here and will transport self home)  Hospital Transport Time of Discharge: 1400  Transition of Care Consult (CM Consult): Discharge Planning  Discharge Durable Medical Equipment: No  Physical Therapy Consult: No  Occupational Therapy Consult: No  Speech Therapy Consult: No  Current Support Network:  Other, Own Home (lives with his sister in a 1 story home with steps)  Confirm Follow Up Transport: Self  Plan discussed with Pt/Family/Caregiver: Yes  Discharge Location  Discharge Placement: [address], [address]

## 2017-06-06 NOTE — PROGRESS NOTES
PCP follow-up has been scheduled with Dr. Misa Le for June 9 at 10:00am for hospital follow-up and INR

## 2017-06-06 NOTE — DISCHARGE SUMMARY
Hospitalist Discharge Summary     Patient ID:  Marcelino Yap  [id number]  77 y.o.  1975    PCP on record: Mike Soto MD    Admit date: 6/1/2017  Discharge date and time: 6/6/2017      DISCHARGE DIAGNOSIS:  Severe Sepsis   MSSA bacteremia in settings of ESRD on HD at home  Abdominal Pain   ESRD on HD at home  CAD s/p PCI LAD with bare metal stent  [address] EF 15% s/p AICD, now EF 30%  Hx recurrent left leg DVT  S/p left leg bypass 2012  Hx heparin induced thrombocytopenia  Hx seizure d/o  Hx nephrotic syndrome causing renal failure    CONSULTATIONS:  IP CONSULT TO GENERAL SURGERY  IP CONSULT TO NEPHROLOGY    Excerpted HPI from H&P of Santana Douglas MD:  Marcelino Yap is a 46 y.o.  male who presents with acute onset fever 101.9, body aches, nausea without vomiting, sharp LLQ abdominal pain radiate into back, headache from top of head radiate down into spine. Symptoms developed 5 minutes into starting home dialysis session today. Took tylenol without improvement in fever. Report hx chronic intermittent abdominal pain since 2011. In 7/2011, had lap aashish for gallstone pancreatitis by Dr. Samantha Carwford. Later in 11/2/2011 had SBO, underwent diagnostic laparoscopy and lap appy by Dr. Kacey Nicole. Two weeks later in 2011, had abdominal sepsis and underwent exploratory laparotomy with small bowel resection for small bowel necrosis. Then 12/2/2011 had wound dehiscence, had exploratory laparotomy and repair of anastomotic leak. Report since then, has intermittent flare up of abdominal pain similar to this. Denies any diarrhea, constipation, sign of GI bleeding. He is anuric, denies any penile discharge. Last admissions in 9/2014 x 2 for acute encephalopathy suspected from opiate or other illicit drug use for chronic abdominal pain. Report hx of diverticulitis     WBC 16.2, lactic acid 2.2, fever 101.1. CT abdomen shows no bowel obstruction or perforation.  There is small thin-walled  collection of fluid posterior right hepatic lobe, measuring 9 mm x 3.5 cm and measured 1.4 cm x 4.4 cm on prior CT dated February 2017. Kidneys: The kidneys are markedly atrophic and several renal cysts are noted. Within the left hemipelvis, there is a partially calcified atrophic kidney,  unchanged. There is a crescentic thin walled fluid collection medial to the left  hepatic lobe, decreased in size measuring approximately 2.5 cm x 11 cm and  measuring approximately 3.4 cm x 13 cm on prior CT dated February 2017.     We were asked to admit for work up and evaluation of the above problems. ______________________________________________________________________  DISCHARGE SUMMARY/HOSPITAL COURSE:  for full details see H&P, daily progress notes, labs, consult notes. Severe Sepsis POA   Due to MSSA bacteremia in settings of ESRD on HD at home  Repeat Cultures negative till date  Repeat 2D echo without obvious vegetation and with no significant change in valvular anomalies from the 2D echo from 2014. EF is actually somewhat better   Will continue IV Ancef with HD  I have spoke to Nephrology and CM, Case Management to arrange IV Ancef 1.5gm after every HD as an OP with his hemodialysis (he does 5 times a week at home), per his HD center, he will need to come to HD center to receive abx and he agrees, will complete 14 days coarse of IV abx  Duplex of HD access negative for abscess  Chest xray neg     Abdominal Pain - intermittent since abdominal surgeries per patient  H/o multiple abdominal surgeries  Sepsis related  Seen by surgery -- for abdominal fluid collections ( which was improved from prior CT) and with significant surgical historyno need for intervention     ESRD on HD at home  Nephrology following  hypokalemia treated      CAD s/p PCI LAD with bare metal stent  [address] EF 15% s/p AICD, now EF 30%  Continue aspirin.  Resumed plavix,, as no plans for surgery      Hx recurrent left leg DVT  S/p left leg bypass 2012  Resumed coumadin     Hx heparin induced thrombocytopenia  No heparin products      Hx seizure d/o  Continue keppra and vimpat      Hx nephrotic syndrome causing renal failure  Hx failed cadaveric renal transplants x 2  _______________________________________________________________________  Patient seen and examined by me on discharge day. Pertinent Findings:  Gen:    Not in distress  Chest: Clear lungs  CVS:   Regular rhythm. No edema  Abd:  Soft, not distended, not tender  Neuro:  Alert, non focal  _______________________________________________________________________  DISCHARGE MEDICATIONS:   Current Discharge Medication List      START taking these medications    Details   ceFAZolin in 0.9% NS (ANCEF) 1 gram/50ml soln IVPB 1.5gm IV after hemodialysis, 5 times a week for 9 days. Qty: 50 mL, Refills: 0      Saccharomyces boulardii (FLORASTOR) 250 mg capsule Take 1 Cap by mouth two (2) times a day for 30 days. Qty: 60 Cap, Refills: 0         CONTINUE these medications which have NOT CHANGED    Details   !! warfarin (COUMADIN) 2.5 mg tablet Take 2.5 mg by mouth every Sunday. omeprazole (PRILOSEC) 20 mg capsule Take 20 mg by mouth daily. calcitRIOL (ROCALTROL) 0.5 mcg capsule Take 0.5 mcg by mouth daily. levETIRAcetam (KEPPRA) 750 mg tablet Take 750 mg by mouth two (2) times a day. lacosamide (VIMPAT) 50 mg tab tablet Take 50 mg by mouth two (2) times a day. atorvastatin (LIPITOR) 20 mg tablet Take 20 mg by mouth daily. !! warfarin (COUMADIN) 5 mg tablet Decrease coumadin dose to 2.5mg while taking these antibiotics. Recheck INR every 2 days until you finish the antibiotics. Qty: 30 Tab, Refills: 0      clopidogrel (PLAVIX) 75 mg tablet Take  by mouth daily. sevelamer carbonate (RENVELA) 800 mg Tab tab Take 2,400 mg by mouth three (3) times daily (with meals). aspirin 81 mg chewable tablet Take 81 mg by mouth daily. !! - Potential duplicate medications found. Please discuss with provider. My Recommended Diet, Activity, Wound Care, and follow-up labs are listed in the patient's Discharge Insturctions which I have personally completed and reviewed.     _______________________________________________________________________  DISPOSITION:    Home with Family: y   Home with HH/PT/OT/RN:    SNF/LTC:    KARLI:    OTHER:        Condition at Discharge:  Stable  _______________________________________________________________________  Follow up with:   PCP : Feng Kerr MD  Follow-up Information     Follow up With Details Comments [id number], [place] Schedule an appointment as soon as possible for a visit in 1 week      INR check (blood work with Primary care physician) In 3 days                Total time in minutes spent coordinating this discharge (includes going over instructions, follow-up, prescriptions, and preparing report for sign off to her PCP) : 35 minutes    Signed:  Quynh Contreras MD

## 2017-06-06 NOTE — PROGRESS NOTES
Nephrology Progress Note     Blaise Leblanc       [facility]. [facility]                   Phone - [phone]   Patient: Hyun Lawson  YOB: 1975     Date- 6/6/2017     CC: Follow up for esrd          Subjective: Interval History:   -  S/p hd  Yesterday  He has positive blood cx - MSSA  S/p prbc tx yesterday - 1 unit  Repeat blood cx negative  bp stable  No c/o sob, fever. No c/o nausea or vomiting  No c/o chest pain     Assessment:   · End Stage Renal Disease - home hemo. F/b DR. HANSEN  · MSSA sepsis- likely source button holes - TTE negative, no fluid collection around avf  · Anemia of CKD  · Secondary hyperparathyroidism of renal origin  · Sepsis  · fever     Plan:   No Dialysis today  He will get Ancef at [facility] unit  epogen with hd. Continue Po calcitriol  Okay to d/c home today - he will continue 5 days//week home hemodialysis  Care Plan discussed with: pt and nurse  Review of Systems: Pertinent items are noted in HPI. Objective:   Vitals:    06/05/17 2030 06/05/17 2258 06/06/17 0306 06/06/17 0729   BP: 126/79 119/77 126/84 129/85   Pulse: 86 74 81 79   Resp: 18 18 18 18   Temp: 99.4 °F (37.4 °C) 98.7 °F (37.1 °C) 98.5 °F (36.9 °C) 98.3 °F (36.8 °C)   SpO2: 98% 100% 100% 97%   Weight:   62.7 kg (138 lb 3.2 oz)    Height:           Intake/Output Summary (Last 24 hours) at 06/06/17 0849  Last data filed at 06/05/17 2000   Gross per 24 hour   Intake              350 ml   Output             1000 ml   Net             -650 ml     Physical Exam:   GEN: NAD  NECK- Supple  RESP: Clear b/l, no wheezing,  CVS: RRR,S1,S2   NEURO: non focal, normal speech  ABDO: soft , non tender  EXT: No Edema , right arm avf +    Chart reviewed. Pertinent Notes reviewed.      Medications list  reviewed             Data Review :  Microbiology:  Lab Results   Component Value Date/Time    Culture result: NO GROWTH 3 DAYS 06/03/2017 05:16 AM    Culture result:  06/01/2017 09:46 AM STAPHYLOCOCCUS AUREUS  ISOLATED FROM ALL 4 BOTTLES DRAWN. ..LAC AND LAC 1010 SITES      Culture result:  06/01/2017 09:46 AM     PRELIMINARY REPORT OF  GRAM POSITIVE COCCI  IN CLUSTERS  GROWING IN 2 OF 4 BOTTLES DRAWN  CALLED TO AND READ BACK BY  MS ALFREDA ADEN RN ON 6/1/17 AT [address] [facility] 31[address]). MS       Lab Results   Component Value Date/Time    Specimen Description: NARES 05/30/2013 05:53 PM    Specimen Description: URINE 11/16/2012 05:15 PM    Specimen Description: URINE 09/17/2012 01:35 AM       Recent Labs      06/06/17   0321  06/05/17   0236  06/04/17   0220  06/03/17   1545   NA   --    --   136   --    K   --    --   3.3*   --    CL   --    --   97   --    CO2   --    --   33*   --    GLU   --    --   78   --    BUN   --    --   19   --    CREA   --    --   5.97*   --    CA   --    --   8.5   --    INR  2.1*  1.8*  1.9*  2.1*     Recent Labs      06/06/17   0321  06/04/17   0220   WBC  6.1  6.7   HGB  8.8*  7.5*   HCT  25.4*  22.3*   PLT  138*  115*     Current Facility-Administered Medications   Medication    ceFAZolin (ANCEF) 1 g in 0.9% NS 50 ml IVPB    0.9% sodium chloride infusion 250 mL    epoetin emilie (EPOGEN;PROCRIT) injection 10,000 Units    clopidogrel (PLAVIX) tablet 75 mg    WARFARIN INFORMATION NOTE (COUMADIN)    sodium chloride (NS) flush 5-10 mL    sodium chloride (NS) flush 5-10 mL    prochlorperazine (COMPAZINE) injection 5 mg    calcitRIOL (ROCALTROL) capsule 0.5 mcg    pantoprazole (PROTONIX) tablet 40 mg    lacosamide (VIMPAT) tablet 50 mg    levETIRAcetam (KEPPRA) tablet 750 mg    aspirin chewable tablet 81 mg    fentaNYL citrate (PF) injection 25 mcg    oxyCODONE-acetaminophen (PERCOCET) 5-325 mg per tablet 1 Tab    acetaminophen (TYLENOL) tablet 650 mg       Maksim Langley MD  [facility]  [email or web address]  [facility] TIA [place], [place], [address]  Phone - [phone]         Fax - [phone] Office  [address] [facility] [address] [place] [address]  Phone - [phone]        Fax - [phone]

## 2017-06-06 NOTE — PROGRESS NOTES
Bedside and Verbal shift change report given to Latosha Darden RN  (oncoming nurse) by Jhonathan Rae RN  (offgoing nurse).  Report included the following information SBAR, Kardex and Recent Results.      Zone Phone: [phone]          Significant changes during shift: transfused 1 unit of PRBC, HGB up to 8.8        Patient Information      Shruthi Wong  46 y.o.  6/1/2017 9:10 AM by Gerry Shelby MD. Shruthi Wong was admitted from [facility]  Problem List      [address]   Patient Active Problem List      Diagnosis Date Noted    Severe sepsis ([address] 06/01/2017    Acute encephalopathy 09/26/2014    Hypoglycemia 09/16/2014    Congestive heart failure, unspecified 09/11/2014    Encephalopathy acute 09/07/2014    Pulmonary edema 09/07/2014    Hyperkalemia 10/06/2013    ESRD (end stage renal disease) on dialysis ([address] 05/14/2013    Dialysis patient ([address] 05/14/2013    Abdominal pain, other specified site 05/14/2013    S/p cadaver renal transplant 05/23/2012    AICD (automatic cardioverter/defibrillator) present 05/23/2012    Arterial occlusion ([address] 02/19/2012    Paronychia of great toe, left 01/19/2012    Acute renal failure ([address] 01/13/2012    Abscess of abdominal cavity ([address] 12/18/2011    Peritonitis ([address] 12/06/2011    Mesenteric hematoma 12/06/2011    Malnutrition ([address] 12/06/2011    DVT (deep venous thrombosis) ([address] 12/06/2011    S/P IVC filter 12/06/2011    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[address] 11/25/2011    HTN (hypertension) 11/06/2011    Anemia 11/06/2011    CHRISTINE (acute kidney injury) ([address] 11/06/2011    S/P appendectomy 11/06/2011    SBO (small bowel obstruction) ([address] 11/06/2011    High cholesterol       Other ill-defined conditions       CAD (coronary artery disease)       Gastrointestinal disorder       Abdominal pain 11/02/2011    Dehydration 07/13/2011    Nausea & vomiting 07/13/2011    Abdominal pain, acute, epigastric 07/13/2011    Serum total bilirubin elevated 07/13/2011    Acute pancreatitis 07/12/2011    Kidney transplant 07/12/2011    Coronary atherosclerosis of native coronary artery 07/12/2011                  Past Medical History:   Diagnosis Date    Abdominal hematoma       AICD (automatic cardioverter/defibrillator) present       CAD (coronary artery disease)         anterior MI s/p 2 stents 2007    Chronic abdominal pain       Chronic kidney disease         ESRD; Dialysis dependent.  DVT (deep venous thrombosis) [facility] 2001    DVT of popliteal vein ([facility] 11/2011      not anticoagulated due to bleeding, IVC filter    Gallstone pancreatitis       Gastrointestinal disorder         acid reflux    Gastrointestinal disorder         peptic ulcer    Hemodialysis patient (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                  Core Measures:      CVA: No No  CHF:No No  PNA:No No      Activity Status:      OOB to Chair yes  Ambulated this shift ye,in the room  Bed Rest No      Supplemental O2: (If Applicable)      NC No  NRB No  Venti-mask No  On 0  Liters/min          LINES AND DRAINS:      PIV only      AV fistula right arm      Patient Safety:      Falls Score Total Score: 1  Safety Level_______  Bed Alarm On? No  Sitter?  No      Plan for upcoming shift: monitor H&H              Discharge Plan: Yes, tomorrow      Active Consults:  IP CONSULT TO GENERAL SURGERY  IP CONSULT TO NEPHROLOGY

## 2017-06-06 NOTE — DISCHARGE INSTRUCTIONS
HOSPITALIST DISCHARGE INSTRUCTIONS    NAME: Adry Wray   :  1975   MRN:  [id number]     Date/Time:  2017 11:39 AM    ADMIT DATE: 2017     DISCHARGE DATE: 2017     DISCHARGE DIAGNOSIS:  Severe Sepsis   MSSA bacteremia in settings of ESRD on HD at home  Abdominal Pain   ESRD on HD at home  CAD s/p PCI LAD with bare metal stent  ICMO EF 15% s/p AICD, now EF 30%  Hx recurrent left leg DVT  S/p left leg bypass   Hx heparin induced thrombocytopenia  Hx seizure d/o  Hx nephrotic syndrome causing renal failure    MEDICATIONS:  · It is important that you take the medication exactly as they are prescribed. · Keep your medication in the bottles provided by the pharmacist and keep a list of the medication names, dosages, and times to be taken in your wallet. · Do not take other medications without consulting your doctor. Pain Management: per above medications    What to do at Home    Recommended diet:  Resume previous diet    Recommended activity: Activity as tolerated    If you have questions regarding the hospital related prescriptions or hospital related issues please call [facility] shreya Price at [phone]. If you experience any of the following symptoms then please call your primary care physician or return to the emergency room if you cannot get hold of your doctor:  Fever, chills, nausea, vomiting, diarrhea, change in mentation, falling, bleeding, shortness of breath    Information obtained by :  I understand that if any problems occur once I am at home I am to contact my physician. I understand and acknowledge receipt of the instructions indicated above.                                                                                                                                            Physician's or R.N.'s Signature                                                                  Date/Time Patient or Representative Signature                                                          Date/Time

## 2017-06-07 LAB
ABO + RH BLD: NORMAL
BLD PROD TYP BPU: NORMAL
BLD PROD TYP BPU: NORMAL
BLOOD GROUP ANTIBODIES SERPL: NORMAL
BPU ID: NORMAL
BPU ID: NORMAL
CROSSMATCH RESULT,%XM: NORMAL
CROSSMATCH RESULT,%XM: NORMAL
HBV SURFACE AB SER QL: REACTIVE
HBV SURFACE AB SER-ACNC: >1000 MIU/ML
HBV SURFACE AG SER QL: <0.1 INDEX
HBV SURFACE AG SER QL: NEGATIVE
PHYSICIAN INSTRUCTIO,%PI: NORMAL
SPECIMEN EXP DATE BLD: NORMAL
STATUS OF UNIT,%ST: NORMAL
STATUS OF UNIT,%ST: NORMAL
UNIT DIVISION, %UDIV: 0
UNIT DIVISION, %UDIV: 0

## 2017-06-08 LAB
BACTERIA SPEC CULT: NORMAL
SERVICE CMNT-IMP: NORMAL

## 2017-06-30 ENCOUNTER — APPOINTMENT (OUTPATIENT)
Dept: CT IMAGING | Age: 42
End: 2017-06-30
Attending: EMERGENCY MEDICINE
Payer: MEDICARE

## 2017-06-30 ENCOUNTER — HOSPITAL ENCOUNTER (EMERGENCY)
Age: 42
Discharge: HOME | End: 2017-06-30
Attending: EMERGENCY MEDICINE
Payer: MEDICARE

## 2017-06-30 VITALS
WEIGHT: 138.89 LBS | HEIGHT: 67 IN | RESPIRATION RATE: 16 BRPM | HEART RATE: 82 BPM | SYSTOLIC BLOOD PRESSURE: 123 MMHG | BODY MASS INDEX: 21.8 KG/M2 | DIASTOLIC BLOOD PRESSURE: 94 MMHG | TEMPERATURE: 98.9 F | OXYGEN SATURATION: 100 %

## 2017-06-30 DIAGNOSIS — R10.32 ABDOMINAL PAIN, LLQ (LEFT LOWER QUADRANT): Primary | ICD-10-CM

## 2017-06-30 DIAGNOSIS — N18.6 ESRD (END STAGE RENAL DISEASE) (HCC): ICD-10-CM

## 2017-06-30 DIAGNOSIS — R11.2 NAUSEA AND VOMITING, INTRACTABILITY OF VOMITING NOT SPECIFIED, UNSPECIFIED VOMITING TYPE: ICD-10-CM

## 2017-06-30 LAB
ALBUMIN SERPL BCP-MCNC: 3.3 G/DL (ref 3.5–5)
ALBUMIN/GLOB SERPL: 0.8 {RATIO} (ref 1.1–2.2)
ALP SERPL-CCNC: 149 U/L (ref 45–117)
ALT SERPL-CCNC: 10 U/L (ref 12–78)
ANION GAP BLD CALC-SCNC: 10 MMOL/L (ref 5–15)
APTT PPP: 33.4 SEC (ref 22.1–32.5)
AST SERPL W P-5'-P-CCNC: 13 U/L (ref 15–37)
BASOPHILS # BLD AUTO: 0.1 K/UL (ref 0–0.1)
BASOPHILS # BLD: 1 % (ref 0–1)
BILIRUB SERPL-MCNC: 1 MG/DL (ref 0.2–1)
BUN SERPL-MCNC: 42 MG/DL (ref 6–20)
BUN/CREAT SERPL: 5 (ref 12–20)
CALCIUM SERPL-MCNC: 8.6 MG/DL (ref 8.5–10.1)
CHLORIDE SERPL-SCNC: 96 MMOL/L (ref 97–108)
CO2 SERPL-SCNC: 26 MMOL/L (ref 21–32)
CREAT SERPL-MCNC: 8.6 MG/DL (ref 0.7–1.3)
EOSINOPHIL # BLD: 0.7 K/UL (ref 0–0.4)
EOSINOPHIL NFR BLD: 8 % (ref 0–7)
ERYTHROCYTE [DISTWIDTH] IN BLOOD BY AUTOMATED COUNT: 15.6 % (ref 11.5–14.5)
GLOBULIN SER CALC-MCNC: 4.1 G/DL (ref 2–4)
GLUCOSE SERPL-MCNC: 76 MG/DL (ref 65–100)
HCT VFR BLD AUTO: 30.2 % (ref 36.6–50.3)
HGB BLD-MCNC: 10.3 G/DL (ref 12.1–17)
INR PPP: 1.7 (ref 0.9–1.1)
LIPASE SERPL-CCNC: 237 U/L (ref 73–393)
LYMPHOCYTES # BLD AUTO: 13 % (ref 12–49)
LYMPHOCYTES # BLD: 1.1 K/UL (ref 0.8–3.5)
MCH RBC QN AUTO: 31.4 PG (ref 26–34)
MCHC RBC AUTO-ENTMCNC: 34.1 G/DL (ref 30–36.5)
MCV RBC AUTO: 92.1 FL (ref 80–99)
MONOCYTES # BLD: 0.6 K/UL (ref 0–1)
MONOCYTES NFR BLD AUTO: 6 % (ref 5–13)
NEUTS SEG # BLD: 6.3 K/UL (ref 1.8–8)
NEUTS SEG NFR BLD AUTO: 72 % (ref 32–75)
PLATELET # BLD AUTO: 120 K/UL (ref 150–400)
POTASSIUM SERPL-SCNC: 3.4 MMOL/L (ref 3.5–5.1)
PROT SERPL-MCNC: 7.4 G/DL (ref 6.4–8.2)
PROTHROMBIN TIME: 17.1 SEC (ref 9–11.1)
RBC # BLD AUTO: 3.28 M/UL (ref 4.1–5.7)
SODIUM SERPL-SCNC: 132 MMOL/L (ref 136–145)
THERAPEUTIC RANGE,PTTT: ABNORMAL SECS (ref 58–77)
WBC # BLD AUTO: 8.7 K/UL (ref 4.1–11.1)

## 2017-06-30 PROCEDURE — 74011250636 HC RX REV CODE- 250/636: Performed by: EMERGENCY MEDICINE

## 2017-06-30 PROCEDURE — 74176 CT ABD & PELVIS W/O CONTRAST: CPT

## 2017-06-30 PROCEDURE — 85730 THROMBOPLASTIN TIME PARTIAL: CPT | Performed by: EMERGENCY MEDICINE

## 2017-06-30 PROCEDURE — 74011250637 HC RX REV CODE- 250/637: Performed by: EMERGENCY MEDICINE

## 2017-06-30 PROCEDURE — 85025 COMPLETE CBC W/AUTO DIFF WBC: CPT | Performed by: EMERGENCY MEDICINE

## 2017-06-30 PROCEDURE — 36415 COLL VENOUS BLD VENIPUNCTURE: CPT | Performed by: EMERGENCY MEDICINE

## 2017-06-30 PROCEDURE — 80053 COMPREHEN METABOLIC PANEL: CPT | Performed by: EMERGENCY MEDICINE

## 2017-06-30 PROCEDURE — 83690 ASSAY OF LIPASE: CPT | Performed by: EMERGENCY MEDICINE

## 2017-06-30 PROCEDURE — 85610 PROTHROMBIN TIME: CPT | Performed by: EMERGENCY MEDICINE

## 2017-06-30 PROCEDURE — 36600 WITHDRAWAL OF ARTERIAL BLOOD: CPT

## 2017-06-30 PROCEDURE — 96372 THER/PROPH/DIAG INJ SC/IM: CPT

## 2017-06-30 PROCEDURE — 74011000255 HC RX REV CODE- 255: Performed by: EMERGENCY MEDICINE

## 2017-06-30 PROCEDURE — 99284 EMERGENCY DEPT VISIT MOD MDM: CPT

## 2017-06-30 RX ORDER — BARIUM SULFATE 20 MG/ML
900 SUSPENSION ORAL ONCE
Status: COMPLETED | OUTPATIENT
Start: 2017-06-30 | End: 2017-06-30

## 2017-06-30 RX ORDER — ONDANSETRON 4 MG/1
4 TABLET, ORALLY DISINTEGRATING ORAL
Status: COMPLETED | OUTPATIENT
Start: 2017-06-30 | End: 2017-06-30

## 2017-06-30 RX ORDER — AMLODIPINE BESYLATE 2.5 MG/1
2.5 TABLET ORAL DAILY
COMMUNITY
End: 2019-01-16

## 2017-06-30 RX ORDER — HYDROMORPHONE HYDROCHLORIDE 1 MG/ML
1 INJECTION, SOLUTION INTRAMUSCULAR; INTRAVENOUS; SUBCUTANEOUS
Status: COMPLETED | OUTPATIENT
Start: 2017-06-30 | End: 2017-06-30

## 2017-06-30 RX ORDER — ONDANSETRON 4 MG/1
4 TABLET, FILM COATED ORAL
Qty: 20 TAB | Refills: 0 | Status: SHIPPED | OUTPATIENT
Start: 2017-06-30 | End: 2017-11-09 | Stop reason: CLARIF

## 2017-06-30 RX ORDER — OXYCODONE AND ACETAMINOPHEN 5; 325 MG/1; MG/1
1 TABLET ORAL
Qty: 20 TAB | Refills: 0 | Status: SHIPPED | OUTPATIENT
Start: 2017-06-30 | End: 2017-11-09 | Stop reason: CLARIF

## 2017-06-30 RX ADMIN — ONDANSETRON 4 MG: 4 TABLET, ORALLY DISINTEGRATING ORAL at 07:46

## 2017-06-30 RX ADMIN — BARIUM SULFATE 900 ML: 21 SUSPENSION ORAL at 07:46

## 2017-06-30 RX ADMIN — HYDROMORPHONE HYDROCHLORIDE 1 MG: 1 INJECTION, SOLUTION INTRAMUSCULAR; INTRAVENOUS; SUBCUTANEOUS at 07:46

## 2017-06-30 NOTE — ED NOTES
Bedside shift change report given to Matilda Rodriguez RN (oncoming nurse) by Erika Downing RN (offgoing nurse). Report included the following information SBAR and ED Summary.

## 2017-06-30 NOTE — DISCHARGE INSTRUCTIONS
Nausea and Vomiting: Care Instructions  Your Care Instructions    When you are nauseated, you may feel weak and sweaty and notice a lot of saliva in your mouth. Nausea often leads to vomiting. Most of the time you do not need to worry about nausea and vomiting, but they can be signs of other illnesses. Two common causes of nausea and vomiting are stomach flu and food poisoning. Nausea and vomiting from viral stomach flu will usually start to improve within 24 hours. Nausea and vomiting from food poisoning may last from 12 to 48 hours. The doctor has checked you carefully, but problems can develop later. If you notice any problems or new symptoms, get medical treatment right away. Follow-up care is a key part of your treatment and safety. Be sure to make and go to all appointments, and call your doctor if you are having problems. It's also a good idea to know your test results and keep a list of the medicines you take. How can you care for yourself at home? · To prevent dehydration, drink plenty of fluids, enough so that your urine is light yellow or clear like water. Choose water and other caffeine-free clear liquids until you feel better. If you have kidney, heart, or liver disease and have to limit fluids, talk with your doctor before you increase the amount of fluids you drink. · Rest in bed until you feel better. · When you are able to eat, try clear soups, mild foods, and liquids until all symptoms are gone for 12 to 48 hours. Other good choices include dry toast, crackers, cooked cereal, and gelatin dessert, such as Jell-O. When should you call for help? Call 911 anytime you think you may need emergency care. For example, call if:  · You passed out (lost consciousness). Call your doctor now or seek immediate medical care if:  · You have symptoms of dehydration, such as:  ¨ Dry eyes and a dry mouth. ¨ Passing only a little dark urine.   ¨ Feeling thirstier than usual.  · You have new or worsening belly pain. · You have a new or higher fever. · You vomit blood or what looks like coffee grounds. Watch closely for changes in your health, and be sure to contact your doctor if:  · You have ongoing nausea and vomiting. · Your vomiting is getting worse. · Your vomiting lasts longer than 2 days. · You are not getting better as expected. Where can you learn more? Go to http://anabel-mor.info/. Enter 25 [id number] in the search box to learn more about \"Nausea and Vomiting: Care Instructions. \"  Current as of: March 20, 2017  Content Version: 11.3  © 4566-2080 [employer]. Care instructions adapted under license by [employer] (which disclaims liability or warranty for this information). If you have questions about a medical condition or this instruction, always ask your healthcare professional. [address] any warranty or liability for your use of this information. Abdominal Pain: Care Instructions  Your Care Instructions    Abdominal pain has many possible causes. Some aren't serious and get better on their own in a few days. Others need more testing and treatment. If your pain continues or gets worse, you need to be rechecked and may need more tests to find out what is wrong. You may need surgery to correct the problem. Don't ignore new symptoms, such as fever, nausea and vomiting, urination problems, pain that gets worse, and dizziness. These may be signs of a more serious problem. Your doctor may have recommended a follow-up visit in the next 8 to 12 hours. If you are not getting better, you may need more tests or treatment. The doctor has checked you carefully, but problems can develop later. If you notice any problems or new symptoms, get medical treatment right away. Follow-up care is a key part of your treatment and safety. Be sure to make and go to all appointments, and call your doctor if you are having problems.  It's also a good idea to know your test results and keep a list of the medicines you take. How can you care for yourself at home? · Rest until you feel better. · To prevent dehydration, drink plenty of fluids, enough so that your urine is light yellow or clear like water. Choose water and other caffeine-free clear liquids until you feel better. If you have kidney, heart, or liver disease and have to limit fluids, talk with your doctor before you increase the amount of fluids you drink. · If your stomach is upset, eat mild foods, such as rice, dry toast or crackers, bananas, and applesauce. Try eating several small meals instead of two or three large ones. · Wait until 48 hours after all symptoms have gone away before you have spicy foods, alcohol, and drinks that contain caffeine. · Do not eat foods that are high in fat. · Avoid anti-inflammatory medicines such as aspirin, ibuprofen (Advil, Motrin), and naproxen (Aleve). These can cause stomach upset. Talk to your doctor if you take daily aspirin for another health problem. When should you call for help? Call 911 anytime you think you may need emergency care. For example, call if:  · You passed out (lost consciousness). · You pass maroon or very bloody stools. · You vomit blood or what looks like coffee grounds. · You have new, severe belly pain. Call your doctor now or seek immediate medical care if:  · Your pain gets worse, especially if it becomes focused in one area of your belly. · You have a new or higher fever. · Your stools are black and look like tar, or they have streaks of blood. · You have unexpected vaginal bleeding. · You have symptoms of a urinary tract infection. These may include:  ¨ Pain when you urinate. ¨ Urinating more often than usual.  ¨ Blood in your urine. · You are dizzy or lightheaded, or you feel like you may faint.   Watch closely for changes in your health, and be sure to contact your doctor if:  · You are not getting better after 1 day (24 hours). Where can you learn more? Go to http://anabel-mor.info/. Enter Z111 in the search box to learn more about \"Abdominal Pain: Care Instructions. \"  Current as of: March 20, 2017  Content Version: 11.3  © 2852-1231 [employer]. Care instructions adapted under license by [employer] (which disclaims liability or warranty for this information). If you have questions about a medical condition or this instruction, always ask your healthcare professional. [address] any warranty or liability for your use of this information.

## 2017-06-30 NOTE — ED NOTES
Assumed care of patient who was ambulatory from triage. He is in NAD and presents to ED with c/o vomiting and abdominal pain since 0300. He has vomited x 2 with last episode 1 hour ago. Denies diarrhea. Abdominal pain is located in LLQ and rated at 7/10. The pain is constant and sharp. Patient denies CP, SOB, and is anuric. He has been treated for staph infection of blood with IV Vancomycin. Final dose was last week. Call bell in reach. Will continue to monitor.

## 2017-08-31 ENCOUNTER — HOSPITAL ENCOUNTER (EMERGENCY)
Age: 42
Discharge: HOME | End: 2017-08-31
Attending: EMERGENCY MEDICINE
Payer: MEDICARE

## 2017-08-31 ENCOUNTER — APPOINTMENT (OUTPATIENT)
Dept: CT IMAGING | Age: 42
End: 2017-08-31
Attending: EMERGENCY MEDICINE
Payer: MEDICARE

## 2017-08-31 VITALS
OXYGEN SATURATION: 100 % | RESPIRATION RATE: 16 BRPM | HEART RATE: 85 BPM | HEIGHT: 67 IN | BODY MASS INDEX: 21.73 KG/M2 | DIASTOLIC BLOOD PRESSURE: 85 MMHG | SYSTOLIC BLOOD PRESSURE: 120 MMHG | WEIGHT: 138.45 LBS | TEMPERATURE: 97.8 F

## 2017-08-31 DIAGNOSIS — R10.12 ABDOMINAL PAIN, LUQ (LEFT UPPER QUADRANT): ICD-10-CM

## 2017-08-31 DIAGNOSIS — N18.6 ESRD (END STAGE RENAL DISEASE) ON DIALYSIS (HCC): Primary | ICD-10-CM

## 2017-08-31 DIAGNOSIS — Z99.2 ESRD (END STAGE RENAL DISEASE) ON DIALYSIS (HCC): Primary | ICD-10-CM

## 2017-08-31 LAB
ALBUMIN SERPL-MCNC: 3.3 G/DL (ref 3.5–5)
ALBUMIN/GLOB SERPL: 0.8 {RATIO} (ref 1.1–2.2)
ALP SERPL-CCNC: 179 U/L (ref 45–117)
ALT SERPL-CCNC: 14 U/L (ref 12–78)
ANION GAP SERPL CALC-SCNC: 7 MMOL/L (ref 5–15)
AST SERPL-CCNC: 19 U/L (ref 15–37)
BASOPHILS # BLD: 0.1 K/UL (ref 0–0.1)
BASOPHILS NFR BLD: 1 % (ref 0–1)
BILIRUB SERPL-MCNC: 0.4 MG/DL (ref 0.2–1)
BUN SERPL-MCNC: 41 MG/DL (ref 6–20)
BUN/CREAT SERPL: 4 (ref 12–20)
CALCIUM SERPL-MCNC: 9 MG/DL (ref 8.5–10.1)
CHLORIDE SERPL-SCNC: 101 MMOL/L (ref 97–108)
CO2 SERPL-SCNC: 28 MMOL/L (ref 21–32)
CREAT SERPL-MCNC: 9.16 MG/DL (ref 0.7–1.3)
EOSINOPHIL # BLD: 0.6 K/UL (ref 0–0.4)
EOSINOPHIL NFR BLD: 7 % (ref 0–7)
ERYTHROCYTE [DISTWIDTH] IN BLOOD BY AUTOMATED COUNT: 14.4 % (ref 11.5–14.5)
GLOBULIN SER CALC-MCNC: 4.2 G/DL (ref 2–4)
GLUCOSE SERPL-MCNC: 78 MG/DL (ref 65–100)
HCT VFR BLD AUTO: 29.6 % (ref 36.6–50.3)
HGB BLD-MCNC: 10.1 G/DL (ref 12.1–17)
LACTATE SERPL-SCNC: 1.2 MMOL/L (ref 0.4–2)
LYMPHOCYTES # BLD: 1.6 K/UL (ref 0.8–3.5)
LYMPHOCYTES NFR BLD: 20 % (ref 12–49)
MCH RBC QN AUTO: 31.7 PG (ref 26–34)
MCHC RBC AUTO-ENTMCNC: 34.1 G/DL (ref 30–36.5)
MCV RBC AUTO: 92.8 FL (ref 80–99)
MONOCYTES # BLD: 0.8 K/UL (ref 0–1)
MONOCYTES NFR BLD: 10 % (ref 5–13)
NEUTS SEG # BLD: 5 K/UL (ref 1.8–8)
NEUTS SEG NFR BLD: 62 % (ref 32–75)
PLATELET # BLD AUTO: 160 K/UL (ref 150–400)
POTASSIUM SERPL-SCNC: 4.2 MMOL/L (ref 3.5–5.1)
PROT SERPL-MCNC: 7.5 G/DL (ref 6.4–8.2)
RBC # BLD AUTO: 3.19 M/UL (ref 4.1–5.7)
SODIUM SERPL-SCNC: 136 MMOL/L (ref 136–145)
WBC # BLD AUTO: 8.2 K/UL (ref 4.1–11.1)

## 2017-08-31 PROCEDURE — 96374 THER/PROPH/DIAG INJ IV PUSH: CPT

## 2017-08-31 PROCEDURE — 99284 EMERGENCY DEPT VISIT MOD MDM: CPT

## 2017-08-31 PROCEDURE — 85025 COMPLETE CBC W/AUTO DIFF WBC: CPT | Performed by: EMERGENCY MEDICINE

## 2017-08-31 PROCEDURE — 80053 COMPREHEN METABOLIC PANEL: CPT | Performed by: EMERGENCY MEDICINE

## 2017-08-31 PROCEDURE — 74011250636 HC RX REV CODE- 250/636: Performed by: EMERGENCY MEDICINE

## 2017-08-31 PROCEDURE — 36415 COLL VENOUS BLD VENIPUNCTURE: CPT | Performed by: EMERGENCY MEDICINE

## 2017-08-31 PROCEDURE — 96361 HYDRATE IV INFUSION ADD-ON: CPT

## 2017-08-31 PROCEDURE — 83605 ASSAY OF LACTIC ACID: CPT | Performed by: EMERGENCY MEDICINE

## 2017-08-31 PROCEDURE — 74176 CT ABD & PELVIS W/O CONTRAST: CPT

## 2017-08-31 RX ORDER — HYDROCODONE BITARTRATE AND ACETAMINOPHEN 5; 325 MG/1; MG/1
1 TABLET ORAL
Qty: 20 TAB | Refills: 0 | Status: ON HOLD | OUTPATIENT
Start: 2017-08-31 | End: 2018-11-19

## 2017-08-31 RX ORDER — ONDANSETRON 4 MG/1
4 TABLET, ORALLY DISINTEGRATING ORAL
Qty: 10 TAB | Refills: 0 | Status: SHIPPED | OUTPATIENT
Start: 2017-08-31 | End: 2019-01-16

## 2017-08-31 RX ORDER — MORPHINE SULFATE 2 MG/ML
4 INJECTION, SOLUTION INTRAMUSCULAR; INTRAVENOUS
Status: COMPLETED | OUTPATIENT
Start: 2017-08-31 | End: 2017-08-31

## 2017-08-31 RX ADMIN — SODIUM CHLORIDE 1000 ML: 900 INJECTION, SOLUTION INTRAVENOUS at 03:59

## 2017-08-31 RX ADMIN — MORPHINE SULFATE 4 MG: 2 INJECTION, SOLUTION INTRAMUSCULAR; INTRAVENOUS at 03:59

## 2017-08-31 NOTE — ED PROVIDER NOTES
HPI Comments: Gin Brown is a 46 y.o. male with PMhx significant for stage 4 kidney failure, HTN, CAD, nephrotic syndrome and PSHx for appendectomy and bowel resections who presents ambulatory to the ED with cc of sharp LLQ abdominal pain. Pt reports that the pain began around 6:00 PM. He began to feel nauseous and vomited, with the most recent episode at 10:00 PM. He has not taken any medication for his pain. He had a normal bowel movement yesterday. Though pt has a history of diverticulitis, he states that his current pain is different. Pt notes that he has a pacemaker due to an MI in 2009. He is a dialysis patient and last underwent a normal dialysis on the morning of 8/30/2017. Pt specifically denies fevers, a history of kidney stones, or recent sick contact. Social Hx: - Tobacco, - EtOH, - Illicit Drugs    PCP: Aj Toth MD  Cardiology: Dr. Sheng Bal  Nephrology: Dr. Andres Hernandez  There are no other complaints, changes or physical findings at this time. The history is provided by the patient. No  was used. Past Medical History:   Diagnosis Date    Abdominal hematoma     AICD (automatic cardioverter/defibrillator) present     CAD (coronary artery disease)     anterior MI s/p 2 stents  2007    Chronic abdominal pain     Chronic kidney disease     ESRD; Dialysis dependent.      DVT (deep venous thrombosis) ([address] 2001    DVT of popliteal vein ([address] 11/2011    not anticoagulated due to bleeding, IVC filter    Gallstone pancreatitis     Gastrointestinal disorder     acid reflux    Gastrointestinal disorder     peptic ulcer    Hemodialysis patient ([address]     High cholesterol     Hypertension     Kidney transplant     b/l kidneys 1995, 2001    Nephrotic syndrome     Other ill-defined conditions     kidny transplant x2,  on dialysis    Other ill-defined conditions     home dialysis    Other ill-defined conditions     hx recurrent left leg DVT    [facility] [address]     Seizures ([facility]     Small bowel obstruction ([facility]     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[facility] [address] [facility]     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    [facility]tach [facility])      Past Surgical History:   Procedure Laterality Date    HX APPENDECTOMY      HX CHOLECYSTECTOMY      HX OTHER SURGICAL      cholecystectomy    HX OTHER SURGICAL  11/2011    exploratory laparotomy    HX OTHER SURGICAL      fem-pop    HX OTHER SURGICAL  02/2013    right upper extremity fistula    HX PACEMAKER  6/2009    AICD    HX SMALL BOWEL RESECTION      HX TRANSPLANT  2001     Kidney    HX UROLOGICAL      2 kidney transplants    IA EDG US EXAM SURGICAL ALTER STOM DUODENUM/JEJUNUM  7/15/2011         IA ESOPHAGOGASTRODUODENOSCOPY TRANSORAL DIAGNOSTIC  5/24/2012          Family History:   Problem Relation Age of Onset    COPD Mother     Lung Disease Mother     Cancer Father      stomach    Cancer Maternal Grandmother      Social History     Social History    Marital status: SINGLE     Spouse name: N/A    Number of children: N/A    Years of education: N/A     Occupational History    Not on file. Social History Main Topics    Smoking status: Never Smoker    Smokeless tobacco: Never Used    Alcohol use No    Drug use: No    Sexual activity: Not on file     Other Topics Concern    Not on file     Social History Narrative     ALLERGIES: Shellfish containing products; Heparin analogues; and Iodinated contrast- oral and iv dye    Review of Systems   Constitutional: Negative for chills and fever. HENT: Negative. Negative for congestion, rhinorrhea, sneezing and sore throat. Eyes: Negative. Negative for redness and visual disturbance. Respiratory: Negative. Negative for cough, shortness of breath and wheezing. Cardiovascular: Negative. Negative for chest pain and leg swelling. Gastrointestinal: Positive for abdominal pain (LLQ), nausea and vomiting. Negative for diarrhea. Genitourinary: Negative.   Negative for difficulty urinating, discharge and frequency. Musculoskeletal: Negative. Negative for arthralgias, back pain, myalgias and neck stiffness. Skin: Negative. Negative for color change and rash. Neurological: Negative. Negative for dizziness, syncope, weakness, numbness and headaches. Hematological: Negative for adenopathy. Psychiatric/Behavioral: Negative. All other systems reviewed and are negative. Vitals:    08/31/17 0157 08/31/17 0200 08/31/17 0215 08/31/17 0407   BP: 129/86 124/81 122/80 120/85   Pulse: 85      Resp: 16      Temp: 97.8 °F (36.6 °C)      SpO2: 98% 100% 100% 100%   Weight: 62.8 kg (138 lb 7.2 oz)      Height: 5' 7\" (1.702 m)             Physical Exam   Constitutional: He is oriented to person, place, and time. HENT:   Head: Atraumatic. Eyes: EOM are normal.   Cardiovascular: Normal rate, regular rhythm, normal heart sounds and intact distal pulses. Exam reveals no gallop and no friction rub. No murmur heard. Pulmonary/Chest: Effort normal and breath sounds normal. No respiratory distress. He has no wheezes. He has no rales. He exhibits no tenderness. Abdominal: Soft. Bowel sounds are normal. He exhibits no distension and no mass. There is tenderness. There is no rebound and no guarding. mild left sided abdominal pain with slight focality in LUQ v LLQ   Musculoskeletal: Normal range of motion. He exhibits no edema or tenderness. Right upper extremitiy fistula   Neurological: He is alert and oriented to person, place, and time. Skin:   well healed remote abdominal incisions    Psychiatric: He has a normal mood and affect. Nursing note and vitals reviewed.      MDM  Number of Diagnoses or Management Options  Diagnosis management comments: DDx: Diverticulitis, colitis, small bowel obstruction        Amount and/or Complexity of Data Reviewed  Clinical lab tests: ordered and reviewed  Review and summarize past medical records: yes    Patient Progress  Patient progress: stable    ED Course Procedures     PROGRESS NOTE:  5:34 AM  Pt feels much better, pain is well controlled at time of discharge. Reviewed labs and imaging, given return precautions due to nonobvious cause for his pain. LABORATORY TESTS:  Recent Results (from the past 12 hour(s))   CBC WITH AUTOMATED DIFF    Collection Time: 08/31/17  3:13 AM   Result Value Ref Range    WBC 8.2 4.1 - 11.1 K/uL    RBC 3.19 (L) 4.10 - 5.70 M/uL    HGB 10.1 (L) 12.1 - 17.0 g/dL    HCT 29.6 (L) 36.6 - 50.3 %    MCV 92.8 80.0 - 99.0 FL    MCH 31.7 26.0 - 34.0 PG    MCHC 34.1 30.0 - 36.5 g/dL    RDW 14.4 11.5 - 14.5 %    PLATELET 231 586 - 471 K/uL    NEUTROPHILS 62 32 - 75 %    LYMPHOCYTES 20 12 - 49 %    MONOCYTES 10 5 - 13 %    EOSINOPHILS 7 0 - 7 %    BASOPHILS 1 0 - 1 %    ABS. NEUTROPHILS 5.0 1.8 - 8.0 K/UL    ABS. LYMPHOCYTES 1.6 0.8 - 3.5 K/UL    ABS. MONOCYTES 0.8 0.0 - 1.0 K/UL    ABS. EOSINOPHILS 0.6 (H) 0.0 - 0.4 K/UL    ABS. BASOPHILS 0.1 0.0 - 0.1 K/UL   METABOLIC PANEL, COMPREHENSIVE    Collection Time: 08/31/17  3:13 AM   Result Value Ref Range    Sodium 136 136 - 145 mmol/L    Potassium 4.2 3.5 - 5.1 mmol/L    Chloride 101 97 - 108 mmol/L    CO2 28 21 - 32 mmol/L    Anion gap 7 5 - 15 mmol/L    Glucose 78 65 - 100 mg/dL    BUN 41 (H) 6 - 20 MG/DL    Creatinine 9.16 (H) 0.70 - 1.30 MG/DL    BUN/Creatinine ratio 4 (L) 12 - 20      GFR est AA 8 (L) >60 ml/min/1.73m2    GFR est non-AA 6 (L) >60 ml/min/1.73m2    Calcium 9.0 8.5 - 10.1 MG/DL    Bilirubin, total 0.4 0.2 - 1.0 MG/DL    ALT (SGPT) 14 12 - 78 U/L    AST (SGOT) 19 15 - 37 U/L    Alk.  phosphatase 179 (H) 45 - 117 U/L    Protein, total 7.5 6.4 - 8.2 g/dL    Albumin 3.3 (L) 3.5 - 5.0 g/dL    Globulin 4.2 (H) 2.0 - 4.0 g/dL    A-G Ratio 0.8 (L) 1.1 - 2.2     LACTIC ACID    Collection Time: 08/31/17  3:52 AM   Result Value Ref Range    Lactic acid 1.2 0.4 - 2.0 MMOL/L     IMAGING RESULTS:  CT Results  (Last 48 hours)               08/31/17 0453  CT ABD PELV WO CONT Final result Impression:  IMPRESSION: No Acute Disease. No significant change since prior studies, most   recent 6/30/2017. Narrative:  INDICATION: abd pain        EXAM: CT Abdomen and CT Pelvis without contrast.   CT dose reduction was achieved through use of a standardized protocol tailored   for this examination and automatic exposure control for dose modulation. FINDINGS:    There are extensive arterial calcifications. Native kidneys and bilateral renal   transplants are atrophic. No urinary tract stones are seen. There is no   hydroureteronephrosis. There is a left renal cyst.       Liver shows no apparent significant finding without contrast. Pancreas, adrenal   glands, spleen and aorta show no significant enlargement. Bowels are not dilated. No focal inflammation is seen. There is no ascites, free   air or substantial adenopathy. The bladder is empty. There is no apparent pelvic mass. There is a chronic,   stable peripherally calcified 3 cm mesenteric mass or cyst.               MEDICATIONS GIVEN:  Medications   sodium chloride 0.9 % bolus infusion 1,000 mL (1,000 mL IntraVENous New Bag 8/31/17 0359)   morphine injection 4 mg (4 mg IntraVENous Given 8/31/17 0359)     IMPRESSION:  1. ESRD (end stage renal disease) on dialysis ([facility] [address]    2. Abdominal pain, LUQ (left upper quadrant)      PLAN:  1. Current Discharge Medication List      START taking these medications    Details   ondansetron (ZOFRAN ODT) 4 mg disintegrating tablet Take 1 Tab by mouth every eight (8) hours as needed for Nausea. Qty: 10 Tab, Refills: 0      HYDROcodone-acetaminophen (NORCO) 5-325 mg per tablet Take 1 Tab by mouth every four (4) hours as needed for Pain. Max Daily Amount: 6 Tabs. Qty: 20 Tab, Refills: 0           2.    Follow-up Information     Follow up With Details Comments Contact Info    Aj Toth MD In 3 days As needed if pain returns or return to ED      [facility]  As needed, If symptoms worsen/recur [address] [address]  [phone]        Return to ED if worse     Discharge Note:  5:40 AM  The patient has been re-evaluated and is ready for discharge. Reviewed available results with patient. Counseled patient on diagnosis and care plan. Patient has expressed understanding, and all questions have been answered. Patient agrees with plan and agrees to follow up as recommended, or return to the ED if their symptoms worsen. Discharge instructions have been provided and explained to the patient, along with reasons to return to the ED. Attestation: This note is prepared by William Mccarty, acting as Scribe for MD Brent Lucas MD: The scribe's documentation has been prepared under my direction and personally reviewed by me in its entirety. I confirm that the note above accurately reflects all work, treatment, procedures, and medical decision making performed by me.

## 2017-08-31 NOTE — ED NOTES
Assumed care of patient from triage. Patient presents with nausea, vomiting, and left lower quadrant abdominal pain that began around 1800 yesterday evening. Patient reports 2 episodes of vomiting, with the last episode being at 2200. Patient denies any diarrhea. Patient is a dialysis patient and states he was last dialyzed on Wednesday with no difficulties. Patient is alert and oriented x4, respirations even and unlabored, vital signs stable. Monitor x2, call bell within reach.
Bedside and Verbal shift change report given to Indra Cassidy RN (oncoming nurse). Report included the following information: SBAR, ED Summary, MAR and Recent Results.
Discharge instructions reviewed with pt and copy given along with RX by Dr Dayana Suh. All questions answered at this time. Pt discharged ambulatory, pt refused wheelchair.
Multiple attempts at IV access unsuccessful. MD aware. ED Tech in room for another attempt.
Patient anuric and will be unable to provide urine sample. MD aware.
Pt MISSAEL to CT.
Pt returned to room from CT.
Still unable to obtain IV access on patient. Assessment by 4 ED staff and 5 attempts with no success. Charge nurse and MD aware.
"You can access the [facility], offered by [facility], by registering with the following website: [email or web address]"

## 2017-08-31 NOTE — DISCHARGE INSTRUCTIONS

## 2017-09-23 ENCOUNTER — HOSPITAL ENCOUNTER (EMERGENCY)
Age: 42
Discharge: HOME | End: 2017-09-23
Attending: EMERGENCY MEDICINE | Admitting: EMERGENCY MEDICINE
Payer: MEDICARE

## 2017-09-23 VITALS
OXYGEN SATURATION: 100 % | SYSTOLIC BLOOD PRESSURE: 131 MMHG | HEART RATE: 90 BPM | WEIGHT: 134.48 LBS | DIASTOLIC BLOOD PRESSURE: 88 MMHG | RESPIRATION RATE: 16 BRPM | BODY MASS INDEX: 21.11 KG/M2 | HEIGHT: 67 IN | TEMPERATURE: 98.1 F

## 2017-09-23 DIAGNOSIS — R10.13 ABDOMINAL PAIN, EPIGASTRIC: Primary | ICD-10-CM

## 2017-09-23 PROCEDURE — 99283 EMERGENCY DEPT VISIT LOW MDM: CPT

## 2017-09-23 PROCEDURE — 74011250637 HC RX REV CODE- 250/637: Performed by: EMERGENCY MEDICINE

## 2017-09-23 RX ORDER — OMEPRAZOLE 20 MG/1
20 CAPSULE, DELAYED RELEASE ORAL DAILY
Qty: 20 CAP | Refills: 0 | Status: SHIPPED | OUTPATIENT
Start: 2017-09-23 | End: 2017-11-09 | Stop reason: CLARIF

## 2017-09-23 RX ORDER — HYDROCODONE BITARTRATE AND ACETAMINOPHEN 5; 325 MG/1; MG/1
1 TABLET ORAL
Qty: 20 TAB | Refills: 0 | Status: SHIPPED | OUTPATIENT
Start: 2017-09-23 | End: 2019-01-16

## 2017-09-23 RX ORDER — DEXAMETHASONE SODIUM PHOSPHATE 10 MG/ML
20 INJECTION INTRAMUSCULAR; INTRAVENOUS ONCE
Status: DISCONTINUED | OUTPATIENT
Start: 2017-09-23 | End: 2017-09-23

## 2017-09-23 RX ORDER — MAG HYDROX/ALUMINUM HYD/SIMETH 200-200-20
30 SUSPENSION, ORAL (FINAL DOSE FORM) ORAL
Status: COMPLETED | OUTPATIENT
Start: 2017-09-23 | End: 2017-09-23

## 2017-09-23 RX ORDER — HYDROCODONE BITARTRATE AND ACETAMINOPHEN 5; 325 MG/1; MG/1
1 TABLET ORAL
Status: COMPLETED | OUTPATIENT
Start: 2017-09-23 | End: 2017-09-23

## 2017-09-23 RX ORDER — DIPHENHYDRAMINE HYDROCHLORIDE 50 MG/ML
50 INJECTION, SOLUTION INTRAMUSCULAR; INTRAVENOUS
Status: DISCONTINUED | OUTPATIENT
Start: 2017-09-23 | End: 2017-09-23

## 2017-09-23 RX ADMIN — HYDROCODONE BITARTRATE AND ACETAMINOPHEN 1 TABLET: 5; 325 TABLET ORAL at 06:19

## 2017-09-23 RX ADMIN — ALUMINUM HYDROXIDE, MAGNESIUM HYDROXIDE, AND SIMETHICONE 30 ML: 200; 200; 20 SUSPENSION ORAL at 05:27

## 2017-09-23 NOTE — ED NOTES
Patient given copy of dc instructions and two script(s). Patient verbalized understanding of instructions and script (s). Patient given a current medication reconciliation form and verbalized understanding of their medications. Patient verbalized understanding of the importance of discussing medications with  his or her physician or clinic when they follow up. Patient alert and oriented and in no acute distress. Pt verbalizes pain scale of 4 out of 10. Patient discharged home ambulatory without assistance.

## 2017-09-23 NOTE — ED PROVIDER NOTES
HPI Comments: Suleiman Thomas is a 46 y.o. Male, with a pertinent PMHx of HTN, peritonitis, abdominal hematoma, chronic abdominal pain, gallstone pancreatitis, SBO, GERD, PUD, and ESRD, who presents ambulatory to the ED c/o an episode of intermittent sudden onset sharp LLQ abdominal pain x 11 PM last night. He notes associated symptoms of nausea and vomiting. Pt reports he has a h/o pancreatitis, for which a cholecystectomy was performed, which had caused a necrotic bowel, prompting for a bowel resection. He notes these episodes of abdominal pain occur frequently and begins with nausea and vomiting. Per medical records, the pt frequents the ED with complaints of abdominal pain. He stated his last episode was last month. Yesterday, he reports he was sitting at home watching TV when he had suddenly felt nauseous and reports a cycle has begun. He states he is currently on omeprazole for his reflux. The pt additionally reports he is on hemodialysis 5 days a week at home. He denies any recent changes to his medications. Pt specifically denies diarrhea. PSHx: Bowel resection, cholecystectomy, appendectomy  Social hx: - Tobacco use, - EtOH use, - Illicit drug use    PCP: Jerod Luis MD    There are no other complaints, changes or physical findings at this time. The history is provided by the patient and medical records. No  was used. Past Medical History:   Diagnosis Date    Abdominal hematoma     AICD (automatic cardioverter/defibrillator) present     CAD (coronary artery disease)     anterior MI s/p 2 stents  2007    Chronic abdominal pain     Chronic kidney disease     ESRD; Dialysis dependent.      DVT (deep venous thrombosis) ([facility] [address] 2001    DVT of popliteal vein ([facility] [address] 11/2011    not anticoagulated due to bleeding, IVC filter    Gallstone pancreatitis     Gastrointestinal disorder     acid reflux    Gastrointestinal disorder     peptic ulcer    Hemodialysis patient ([facility] [address]  High cholesterol     Hypertension     Kidney transplant     b/l kidneys 1995, 2001    Nephrotic syndrome     Other ill-defined conditions     kidny transplant x2,  on dialysis    Other ill-defined conditions     home dialysis    Other ill-defined conditions     hx recurrent left leg DVT    Peritonitis (HCC)     Seizures (HCC)     Small bowel obstruction ([facility] [address] [facility]     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[facility] [address]     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)        Past Surgical History:   Procedure Laterality Date    HX APPENDECTOMY      HX CHOLECYSTECTOMY      HX OTHER SURGICAL      cholecystectomy    HX OTHER SURGICAL  11/2011    exploratory laparotomy    HX OTHER SURGICAL      fem-pop    HX OTHER SURGICAL  02/2013    right upper extremity fistula    HX PACEMAKER  6/2009    AICD    HX SMALL BOWEL RESECTION      HX TRANSPLANT  2001     Kidney    HX UROLOGICAL      2 kidney transplants    DE EDG US EXAM SURGICAL ALTER STOM DUODENUM/JEJUNUM  7/15/2011         DE ESOPHAGOGASTRODUODENOSCOPY TRANSORAL DIAGNOSTIC  5/24/2012              Family History:   Problem Relation Age of Onset    COPD Mother     Lung Disease Mother     Cancer Father      stomach    Cancer Maternal Grandmother        Social History     Social History    Marital status: SINGLE     Spouse name: N/A    Number of children: N/A    Years of education: N/A     Occupational History    Not on file. Social History Main Topics    Smoking status: Never Smoker    Smokeless tobacco: Never Used    Alcohol use No    Drug use: No    Sexual activity: Not on file     Other Topics Concern    Not on file     Social History Narrative         ALLERGIES: Shellfish containing products; Heparin analogues; and Iodinated contrast- oral and iv dye    Review of Systems   Constitutional: Negative for chills, diaphoresis, fever and unexpected weight change. HENT: Negative for congestion and sore throat.     Respiratory: Negative for cough and shortness of breath. Cardiovascular: Negative for chest pain. -ZAMUDIO   Gastrointestinal: Positive for abdominal pain, nausea and vomiting. Negative for diarrhea. Genitourinary: Negative for discharge, dysuria and frequency. Musculoskeletal: Negative for arthralgias and neck pain. Skin: Negative for rash and wound. Neurological: Negative for dizziness, syncope and headaches.        -focal weakness     Patient Vitals for the past 12 hrs:   Temp Pulse Resp BP SpO2   09/23/17 0455 98.1 °F (36.7 °C) 90 16 131/88 100 %            Physical Exam   Constitutional: He is oriented to person, place, and time. No distress. HENT:   Head: Normocephalic and atraumatic. Eyes: Right eye exhibits no discharge. Left eye exhibits no discharge. No conjunctival redness   Neck: Neck supple. No tracheal deviation present. Cardiovascular: Normal rate and regular rhythm. Exam reveals no gallop and no friction rub. No murmur heard. Pulmonary/Chest: He has no wheezes. He has no rhonchi. He has no rales. CTA   Abdominal: Soft. Bowel sounds are normal. He exhibits no distension. There is tenderness in the epigastric area. Genitourinary:   Genitourinary Comments: No perineal lesions, no CVA tenderness   Musculoskeletal: He exhibits no edema. Back: No point tenderness, no bony tenderness, no discoloration or swelling   Lymphadenopathy:     He has no cervical adenopathy. Neurological: He is alert and oriented to person, place, and time. No focal weakness, no changes in vision or hearing   Skin: Skin is warm. No lesion and no rash noted. He is not diaphoretic. Psychiatric: He has a normal mood and affect.  Thought content normal.        MDM  Number of Diagnoses or Management Options  Diagnosis management comments:     DDx: Gastritis, gastric ulcer, duodenal ulcer, hiatal hernia, GERD, gastroenteritis, food poisoning       Amount and/or Complexity of Data Reviewed  Clinical lab tests: ordered and reviewed  Obtain history from someone other than the patient: yes (Medical Records)  Review and summarize past medical records: yes    Patient Progress  Patient progress: stable    ED Course       Procedures    LABORATORY TESTS:  No results found for this or any previous visit (from the past 12 hour(s)). MEDICATIONS GIVEN:  Medications   famotidine (PF) (PEPCID) 20 mg in sodium chloride 0.9 % 10 mL injection (not administered)   alum-mag hydroxide-simeth (MYLANTA) oral suspension 30 mL (30 mL Oral Given 9/23/17 0527)   HYDROcodone-acetaminophen (NORCO) 5-325 mg per tablet 1 Tab (1 Tab Oral Given 9/23/17 6025)       IMPRESSION:  1. Abdominal pain, epigastric        PLAN:  1. Discharge home  Discharge Medication List as of 9/23/2017  6:12 AM      START taking these medications    Details   !! omeprazole (PRILOSEC) 20 mg capsule Take 1 Cap by mouth daily. , Print, Disp-20 Cap, R-0      !! HYDROcodone-acetaminophen (NORCO) 5-325 mg per tablet Take 1 Tab by mouth every four (4) hours as needed for Pain. Max Daily Amount: 6 Tabs., Print, Disp-20 Tab, R-0       !! - Potential duplicate medications found. Please discuss with provider. CONTINUE these medications which have NOT CHANGED    Details   ondansetron (ZOFRAN ODT) 4 mg disintegrating tablet Take 1 Tab by mouth every eight (8) hours as needed for Nausea., Normal, Disp-10 Tab, R-0      !! warfarin (COUMADIN) 2.5 mg tablet Take 2.5 mg by mouth every Sunday., Historical Med      calcitRIOL (ROCALTROL) 0.5 mcg capsule Take 0.5 mcg by mouth daily. , Historical Med      levETIRAcetam (KEPPRA) 750 mg tablet Take 750 mg by mouth two (2) times a day., Historical Med      lacosamide (VIMPAT) 50 mg tab tablet Take 50 mg by mouth two (2) times a day., Historical Med      atorvastatin (LIPITOR) 20 mg tablet Take 20 mg by mouth daily. , Historical Med      !! warfarin (COUMADIN) 5 mg tablet Decrease coumadin dose to 2.5mg while taking these antibiotics.  Recheck INR every 2 days until you finish the antibiotics. , Print, Disp-30 Tab, R-0      clopidogrel (PLAVIX) 75 mg tablet Take  by mouth daily. , Historical Med      sevelamer carbonate (RENVELA) 800 mg Tab tab Take 2,400 mg by mouth three (3) times daily (with meals). , Historical Med      aspirin 81 mg chewable tablet Take 81 mg by mouth daily. , Historical Med      !! HYDROcodone-acetaminophen (NORCO) 5-325 mg per tablet Take 1 Tab by mouth every four (4) hours as needed for Pain. Max Daily Amount: 6 Tabs., Print, Disp-20 Tab, R-0      amLODIPine (NORVASC) 2.5 mg tablet Take 2.5 mg by mouth daily. , Historical Med      oxyCODONE-acetaminophen (PERCOCET) 5-325 mg per tablet Take 1 Tab by mouth every four (4) hours as needed for Pain. Max Daily Amount: 6 Tabs., Print, Disp-20 Tab, R-0      ondansetron hcl (ZOFRAN, AS HYDROCHLORIDE,) 4 mg tablet Take 1 Tab by mouth every eight (8) hours as needed for Nausea., Normal, Disp-20 Tab, R-0      !! omeprazole (PRILOSEC) 20 mg capsule Take 20 mg by mouth daily. , Historical Med       !! - Potential duplicate medications found. Please discuss with provider. 2.   Follow-up Information     Follow up With Details Comments Contact Info    Allen Fernandez MD           Return to ED if worse     DISCHARGE NOTE  6:13 AM  The patient has been re-evaluated and is ready for discharge. Reviewed available results with patient. Counseled patient on diagnosis and care plan. Patient has expressed understanding, and all questions have been answered. Patient agrees with plan and agrees to follow up as recommended, or return to the ED if their symptoms worsen. Discharge instructions have been provided and explained to the patient, along with reasons to return to the ED. ATTESTATION:  This note is prepared by Starr Chamorro, acting as Scribe for Valeria Barnes MD.    Valeria Barnes MD: The scribe's documentation has been prepared under my direction and personally reviewed by me in its entirety.  I confirm that the note above accurately reflects all work, treatment, procedures, and medical decision making performed by me.

## 2017-11-06 ENCOUNTER — OFFICE VISIT (OUTPATIENT)
Dept: SURGERY | Age: 42
End: 2017-11-06

## 2017-11-06 VITALS
OXYGEN SATURATION: 98 % | WEIGHT: 146 LBS | TEMPERATURE: 97.5 F | BODY MASS INDEX: 22.91 KG/M2 | HEART RATE: 82 BPM | SYSTOLIC BLOOD PRESSURE: 111 MMHG | RESPIRATION RATE: 20 BRPM | DIASTOLIC BLOOD PRESSURE: 76 MMHG | HEIGHT: 67 IN

## 2017-11-06 DIAGNOSIS — N18.6 ESRD (END STAGE RENAL DISEASE) ON DIALYSIS (HCC): ICD-10-CM

## 2017-11-06 DIAGNOSIS — T82.828A: Primary | ICD-10-CM

## 2017-11-06 DIAGNOSIS — Z99.2 ESRD (END STAGE RENAL DISEASE) ON DIALYSIS (HCC): ICD-10-CM

## 2017-11-06 NOTE — MR AVS SNAPSHOT
Visit Information Date & Time Provider Department Dept. Phone Encounter #  
 11/6/2017  9:00 AM Beth Maldonado MD [facility] [address] [phone] [id number] Upcoming Health Maintenance Date Due DTaP/Tdap/Td series (1 - Tdap) 9/28/1998 Pneumococcal 19-64 Highest Risk (2 of 3 - PCV13) 3/1/2013 INFLUENZA AGE 9 TO ADULT 8/1/2017 Allergies as of 11/6/2017  Review Complete On: 11/6/2017 By: Mark Zendejas Severity Noted Reaction Type Reactions Shellfish Containing Products High 10/07/2011    Swelling Break out in rash, trouble breathing Heparin Analogues  11/23/2011    Other (comments) Positive history of HIT Iodinated Contrast- Oral And Iv Dye  10/29/2011   Intolerance Other (comments) Because of renal disease. No rash or hives per patient report 1/14/2012 Current Immunizations  Reviewed on 5/26/2015 Name Date Influenza Vaccine (Madin Genoveva Canine Kidney) PF 9/16/2014 12:20 PM  
 Influenza Vaccine Whole 10/1/2011 Pneumococcal Vaccine (Unspecified Type) 3/1/2012 ZZZ-RETIRED (DO NOT USE) Pneumococcal Vaccine (Unspecified Type)  Deferred (Patient Refused) Not reviewed this visit Vitals BP Pulse Temp Resp Height(growth percentile) Weight(growth percentile) 111/76 82 97.5 °F (36.4 °C) (Oral) 20 5' 7\" (1.702 m) 146 lb (66.2 kg) SpO2 BMI Smoking Status 98% 22.87 kg/m2 Never Smoker BMI and BSA Data Body Mass Index Body Surface Area  
 22.87 kg/m 2 1.77 m 2 Preferred Pharmacy Pharmacy Name Phone [facility] [address] [place] [address] [address] [phone] Your Updated Medication List  
  
   
This list is accurate as of: 11/6/17 10:18 AM.  Always use your most recent med list. amLODIPine 2.5 mg tablet Commonly known as:  Kevin Scales Take 2.5 mg by mouth daily. aspirin 81 mg chewable tablet Take 81 mg by mouth daily. calcitRIOL 0.5 mcg capsule Commonly known as:  ROCALTROL Take 0.5 mcg by mouth daily. * HYDROcodone-acetaminophen 5-325 mg per tablet Commonly known as:  Wayna Glaze Take 1 Tab by mouth every four (4) hours as needed for Pain. Max Daily Amount: 6 Tabs. * HYDROcodone-acetaminophen 5-325 mg per tablet Commonly known as:  Wayna Glaze Take 1 Tab by mouth every four (4) hours as needed for Pain. Max Daily Amount: 6 Tabs. KEPPRA 750 mg tablet Generic drug:  levETIRAcetam  
Take 750 mg by mouth two (2) times a day. LIPITOR 20 mg tablet Generic drug:  atorvastatin Take 20 mg by mouth daily. * omeprazole 20 mg capsule Commonly known as:  PRILOSEC Take 20 mg by mouth daily. * omeprazole 20 mg capsule Commonly known as:  PriLOSEC Take 1 Cap by mouth daily. ondansetron 4 mg disintegrating tablet Commonly known as:  ZOFRAN ODT Take 1 Tab by mouth every eight (8) hours as needed for Nausea. ondansetron hcl 4 mg tablet Commonly known as:  ZOFRAN (AS HYDROCHLORIDE) Take 1 Tab by mouth every eight (8) hours as needed for Nausea. oxyCODONE-acetaminophen 5-325 mg per tablet Commonly known as:  PERCOCET Take 1 Tab by mouth every four (4) hours as needed for Pain. Max Daily Amount: 6 Tabs. PLAVIX 75 mg Tab Generic drug:  clopidogrel Take  by mouth daily. RENVELA 800 mg Tab tab Generic drug:  sevelamer carbonate Take 2,400 mg by mouth three (3) times daily (with meals). VIMPAT 50 mg Tab tablet Generic drug:  lacosamide Take 50 mg by mouth two (2) times a day. * COUMADIN 2.5 mg tablet Generic drug:  warfarin Take 2.5 mg by mouth every Sunday. * warfarin 5 mg tablet Commonly known as:  COUMADIN Decrease coumadin dose to 2.5mg while taking these antibiotics. Recheck INR every 2 days until you finish the antibiotics. * Notice: This list has 6 medication(s) that are the same as other medications prescribed for you. Read the directions carefully, and ask your doctor or other care provider to review them with you. To-Do List   
 11/14/2017 12:00 PM  
  Appointment with Ajith Thompson at [facility] ([phone]) DIET RESTRICTIONS NPO, do not eat or drink 8 hours prior to test. Take all medications not requiring food with sip of water, excluding blood thinners and diabetic medications. GENERAL INSTRUCTIONS 1. Bring any non [facility] facility films/images pertaining to the area of interest with you on the day of appointment. 2. Bring a list of all medications you are currently taking, including over the counter medications. 3. A valid order with Physicians signature is required for all scheduled tests. 4. Blood thinners and platelet inhibitors must be stopped 3-5 days prior to procedure. Consult your ordering physician prior to stopping them. 5. Time given is ARRIVAL time, not procedure time. 6. You must have a  present before a procedure is started. 7. The procedure may last 1-1 ½ hours. You may be required to stay 4-6 hours after the procedure for observation. If you have any questions please direct them to your ordering physician. Introducing [facility]! Dear Rajiv Chaves: Thank you for requesting a [employer] account. Our records indicate that you already have an active Rock[employer] account. You can access your account anytime at [email or web address] Did you know that you can access your hospital and ER discharge instructions at any time in Rock[employer]? You can also review all of your test results from your hospital stay or ER visit. Additional Information If you have questions, please visit the Frequently Asked Questions section of the Rock[employer] website at [email or web address] Remember, Rockpack is NOT to be used for urgent needs.  For medical emergencies, dial 911. Now available from your iPhone and Android! Please provide this summary of care documentation to your next provider. Your primary care clinician is listed as Virginia Moser. If you have any questions after today's visit, please call [phone].

## 2017-11-06 NOTE — PROGRESS NOTES
History of Present Illness: The patient is a 38 y.o. man referred by Dr. Shanita Vaughn regarding dialysis access. The patient is presently dialyzed by way of a right upper arm transposed AV fistula. The patient has had several renal transplants in the past. The current fistula has had multiple interventions and the patient reports he has a total of three stents in place. The patient does home hemodialysis. He is having increasing difficulty being able to insert the venous needle and says that last night he was unable to achieve satisfactory access so that he was not able to get his hemodialysis. Normally, he dialyzes 5 days per week at home. Past Medical History: The patient has a history of defibrillator in place on the left. Allergies include iodinated contrast material. However, the patient reports this is related to renal disease and he does not have rash or hives. The patient has a history of Heparin induced thrombocytopenia and has therefore listed an allergy for Heparin analogs. The patient's medications include aspirin, Plavix and Coumadin. Aspirin and Plavix are related to coronary artery disease and peripheral vascular disease. Coumadin is related to multiple episodes of deep vein thrombosis. Past medical history also includes gallstone pancreatitis with resulting colon necrosis requiring partial colectomy. The patient has had previous small bowel reconstruction requiring surgery. There is a history of coronary artery disease, status post MI and angioplasty x 2 in 2007. The patient has a history of DVT and vena cava filter. His history includes hypercholesterolemia, hypertension, seizures. The patient has never smoked. He does not use alcohol. He has a history of femoral popliteal bypass. There is a history of thrombocytopenia in the past, but recent measurement 8/31/17 showed platelet count 420,014. The patient currently does not report anterior chest pain or shortness of breath. Examination:  The patient is an alert man in no acute distress. He is, however, noted to be somewhat sleepy and frequently nodded off during examination. He would then, however, awaken and be fully alert and oriented. MSK: Examination of the right upper extremity revealed normal palpable radial pulse on the right. There is no right arm edema. There is a transposed AV fistula in the right upper arm. There is a thrill in the region of the arteriovenous anastomosis. There is then some pulsatility in the fistula above that level and there is transition to a thrill again as the fistula turns and goes to the medial upper arm. I do not feel pulsatility above that level. There is increased diameter of the lower 1/3 of the transposed fistula and to a lesser extent, the upper 1/3. Ultrasound Examination: Duplex ultrasound of the right arteriovenous fistula in the right arm is carried out. This showed the arteriovenous anastomosis is widely patent. The brachial artery proximal to anastomosis is 6.4 mm in diameter. Distal to the anastomosis, the brachial artery is 6.3 mm in diameter. There are a few flecks of calcification in the initial wall of the fistula. The lower puncture zone is 11 mm diameter. There is then division of the flow into 2 channels. The medial channel is defined by a stent which is patent. There is an area of stenosis in the stent. The more lateral channel has a high flow rate, but is present only for about 2-1/2 cm and then it appears to disappear at that level. This would raise the question as to whether this is in part a pseudoaneurysm. This lower stent occupies the middle 1/3 of the transposed portion of the vein. Then above the stent, the diameter initially is 5 mm and then goes down to about 2.1 mm for a 2 cm distance before there is a 2nd stent which is 6 mm diameter and is patent.  This 2nd stent extends through the swing zone and then ends above which the brachial vein which lies medial to the brachial artery is 12 mm diameter. There is so little transposed fistula vein available for puncture related to stent placement and to stenosis or pseudoaneurysm formation, that I do not think this fistula can be salvaged. However, given that there is actually fairly good flow in the fistula and there is no palpable pulsatility in the vein above the upper stent, I think it may be possible to replace the fistula with an arteriovenous graft segment tunneled to the outer aspect of the existing fistula. I would anticipate we could tie into either the beginning portion of the native fistula vs the native artery at the lower end and tie into the native brachial vein at the upper end. He potentially could have then more proximal stent in the outflow vein, but if so, this clinically is patent. I spoke with Dr. Liv Junior regarding the patient's situation. I reviewed with him the patient's inability to get dialysis yesterday. Dr. Reyes Trinh will determine if the patient needs to have a dialysis catheter placed before the time of surgery or not. Impression/Plan: I recommended planning for insertion of arteriovenous graft right upper arm with ligation of existing arteriovenous fistula in the right upper arm. Surgery will be done under general anesthesia. Risks versus benefits were reviewed with the patient. Risks of surgery include bleeding, infection, failure of the graft, ischemia of the hand, swelling of the arm, injury to vessels or nerves, risk of anesthesia, etc. The patient understands and wishes to proceed. If the patient does not have a dialysis catheter placed prior to the time of surgery, then I would plan to have the radiologist place a central catheter on the day of surgery. I reviewed with the patient the risks vs benefits of that procedure. I will plan to stop aspirin, Plavix and Coumadin 5 days prior to surgery.     I informed the patient of my conversation with Dr. Reyes Trinh so that he can make plans if needed for a central catheter sooner than the time of surgery. Final Diagnosis:  End-stage renal disease, .stenosis of AV fistula. MedDENA/yue     cc:  Rudy Flores MD

## 2017-11-09 NOTE — PERIOP NOTES
[facility]  Preoperative Instructions        Surgery Date 11/14/17          Time of Arrival 0545    1. On the day of your surgery, please report to the Surgical Services Registration Desk and sign in at your designated time. The Surgery Center is located to the right of the Emergency Room. 2. You must have someone with you to drive you home. You should not drive a car for 24 hours following surgery. Please make arrangements for a friend or family member to stay with you for the first 24 hours after your surgery. 3. Do not have anything to eat or drink (including water, gum, mints, coffee, juice) after midnight 11/13/17?? Melissa Hernandez ? This may not apply to medications prescribed by your physician. ?(Please note below the special instructions with medications to take the morning of your procedure.)    4. We recommend you do not drink any alcoholic beverages for 24 hours before and after your surgery. 5. Contact your surgeons office for instructions on the following medications: non-steroidal anti-inflammatory drugs (i.e. Advil, Aleve), vitamins, and supplements. (Some surgeons will want you to stop these medications prior to surgery and others may allow you to take them)  **If you are currently taking Plavix, Coumadin, Aspirin and/or other blood-thinning agents, contact your surgeon for instructions. ** Your surgeon will partner with the physician prescribing these medications to determine if it is safe to stop or if you need to continue taking. Please do not stop taking these medications without instructions from your surgeon    6. Wear comfortable clothes. Wear glasses instead of contacts. Do not bring any money or jewelry. Please bring picture ID, insurance card, and any prearranged co-payment or hospital payment. Do not wear make-up, particularly mascara the morning of your surgery. Do not wear nail polish, particularly if you are having foot /hand surgery.   Wear your hair loose or down, no ponytails, buns, lavell pins or clips. All body piercings must be removed. Please shower with antibacterial soap for three consecutive days before and on the morning of surgery, but do not apply any lotions, powders or deodorants after the shower on the day of surgery. Please use a fresh towels after each shower. Please sleep in clean clothes and change bed linens the night before surgery. Please do not shave for 48 hours prior to surgery. Shaving of the face is acceptable. 7. You should understand that if you do not follow these instructions your surgery may be cancelled. If your physical condition changes (I.e. fever, cold or flu) please contact your surgeon as soon as possible. 8. It is important that you be on time. If a situation occurs where you may be late, please call [phone] (OR Holding Area). 9. If you have any questions and or problems, please call [phone] (Pre-admission Testing). 10. Your surgery time may be subject to change. You will receive a phone call the evening prior if your time changes. 11.  If having outpatient surgery, you must have someone to drive you here, stay with you during the duration of your stay, and to drive you home at time of discharge. 12.   In an effort to improve the efficiency, privacy, and safety for all of our Pre-op patients visitors are not allowed in the Holding area. Once you arrive and are registered your family/visitors will be asked to remain in the waiting room. The Pre-op staff will get you from the Surgical Waiting Area and will explain to you and your family/visitors that the Pre-op phase is beginning. The staff will answer any questions and provide instructions for tracking of the patient, by use of the existing tracking number and color-coded status board in the waiting room.   At this time the staff will also ask for your designated spokesperson information in the event that the physician or staff need to provide an update or obtain any pertinent information. The designated spokesperson will be notified if the physician needs to speak to family during the pre-operative phase. If at any time your family/visitors has questions or concerns they may approach the volunteer desk in the waiting area for assistance. Special Instructions:Coumadin, Plavix & ASA per surgeon. MEDICATIONS TO TAKE THE MORNING OF SURGERY WITH A SIP OF WATER:Norvasc, Prilosec, Vimpat, Keppra      I understand a pre-operative phone call will be made to verify my surgery time. In the event that I am not available, I give permission for a message to be left on my answering service and/or with another person?   {yes @ [phone]         ___________________      __________   _________    (Signature of Patient)             (Witness)                (Date and Time)

## 2017-11-12 PROBLEM — A41.9 SEVERE SEPSIS (HCC): Status: RESOLVED | Noted: 2017-06-01 | Resolved: 2017-11-12

## 2017-11-12 PROBLEM — R65.20 SEVERE SEPSIS (HCC): Status: RESOLVED | Noted: 2017-06-01 | Resolved: 2017-11-12

## 2017-11-14 ENCOUNTER — HOSPITAL ENCOUNTER (OUTPATIENT)
Dept: INTERVENTIONAL RADIOLOGY/VASCULAR | Age: 42
Discharge: HOME | End: 2017-11-14
Attending: SURGERY

## 2017-11-14 ENCOUNTER — ANESTHESIA (OUTPATIENT)
Dept: SURGERY | Age: 42
End: 2017-11-14
Payer: MEDICARE

## 2017-11-14 ENCOUNTER — HOSPITAL ENCOUNTER (OUTPATIENT)
Age: 42
Setting detail: OUTPATIENT SURGERY
Discharge: HOME | End: 2017-11-14
Attending: SURGERY | Admitting: SURGERY
Payer: MEDICARE

## 2017-11-14 ENCOUNTER — ANESTHESIA EVENT (OUTPATIENT)
Dept: SURGERY | Age: 42
End: 2017-11-14
Payer: MEDICARE

## 2017-11-14 VITALS
DIASTOLIC BLOOD PRESSURE: 65 MMHG | OXYGEN SATURATION: 98 % | HEIGHT: 67 IN | TEMPERATURE: 98.3 F | WEIGHT: 146.16 LBS | RESPIRATION RATE: 18 BRPM | SYSTOLIC BLOOD PRESSURE: 122 MMHG | HEART RATE: 88 BPM | BODY MASS INDEX: 22.94 KG/M2

## 2017-11-14 DIAGNOSIS — N18.6 END STAGE RENAL DISEASE (HCC): ICD-10-CM

## 2017-11-14 LAB
ANION GAP BLD CALC-SCNC: 14 MMOL/L (ref 5–15)
BUN BLD-MCNC: 42 MG/DL (ref 9–20)
CA-I BLD-MCNC: 1.21 MMOL/L (ref 1.12–1.32)
CHLORIDE BLD-SCNC: 100 MMOL/L (ref 98–107)
CO2 BLD-SCNC: 30 MMOL/L (ref 21–32)
CREAT BLD-MCNC: 11.1 MG/DL (ref 0.6–1.3)
GLUCOSE BLD-MCNC: 74 MG/DL (ref 65–100)
HCT VFR BLD CALC: 31 % (ref 36.6–50.3)
HGB BLD-MCNC: 10.5 GM/DL (ref 12.1–17)
POTASSIUM BLD-SCNC: 4.5 MMOL/L (ref 3.5–5.1)
SERVICE CMNT-IMP: ABNORMAL
SODIUM BLD-SCNC: 138 MMOL/L (ref 136–145)

## 2017-11-14 PROCEDURE — 77030020255 HC SOL INJ LR 1000ML BG: Performed by: SURGERY

## 2017-11-14 PROCEDURE — 77030020153 HC PRB DOPLR DISP MIZU -C: Performed by: SURGERY

## 2017-11-14 PROCEDURE — 77030002916 HC SUT ETHLN J&J -A: Performed by: SURGERY

## 2017-11-14 PROCEDURE — 74011250636 HC RX REV CODE- 250/636: Performed by: SURGERY

## 2017-11-14 PROCEDURE — 77030002706 HC INSRT CLMP EDWD -A: Performed by: SURGERY

## 2017-11-14 PROCEDURE — 74011250637 HC RX REV CODE- 250/637: Performed by: SURGERY

## 2017-11-14 PROCEDURE — 77030013079 HC BLNKT BAIR HGGR 3M -A: Performed by: ANESTHESIOLOGY

## 2017-11-14 PROCEDURE — 77030002524 HC INSTR CLMP FGRTY EDWD -B: Performed by: SURGERY

## 2017-11-14 PROCEDURE — 76210000006 HC OR PH I REC 0.5 TO 1 HR: Performed by: SURGERY

## 2017-11-14 PROCEDURE — 80047 BASIC METABLC PNL IONIZED CA: CPT

## 2017-11-14 PROCEDURE — 76060000041 HC ANESTHESIA 5 TO 5.5 HR: Performed by: SURGERY

## 2017-11-14 PROCEDURE — 77030008684 HC TU ET CUF COVD -B: Performed by: ANESTHESIOLOGY

## 2017-11-14 PROCEDURE — 77030002987 HC SUT PROL J&J -B: Performed by: SURGERY

## 2017-11-14 PROCEDURE — 74011000250 HC RX REV CODE- 250

## 2017-11-14 PROCEDURE — 77030020782 HC GWN BAIR PAWS FLX 3M -B

## 2017-11-14 PROCEDURE — 74011000250 HC RX REV CODE- 250: Performed by: SURGERY

## 2017-11-14 PROCEDURE — 77030032490 HC SLV COMPR SCD KNE COVD -B: Performed by: SURGERY

## 2017-11-14 PROCEDURE — 77030011640 HC PAD GRND REM COVD -A: Performed by: SURGERY

## 2017-11-14 PROCEDURE — 76010000137 HC OR TIME 5 TO 5.5 HR: Performed by: SURGERY

## 2017-11-14 PROCEDURE — 77030026438 HC STYL ET INTUB CARD -A: Performed by: ANESTHESIOLOGY

## 2017-11-14 PROCEDURE — 74011250636 HC RX REV CODE- 250/636: Performed by: ANESTHESIOLOGY

## 2017-11-14 PROCEDURE — 77030002888 HC SUT CHRMC J&J -A: Performed by: SURGERY

## 2017-11-14 PROCEDURE — 77030019908 HC STETH ESOPH SIMS -A: Performed by: ANESTHESIOLOGY

## 2017-11-14 PROCEDURE — C1757 CATH, THROMBECTOMY/EMBOLECT: HCPCS | Performed by: SURGERY

## 2017-11-14 PROCEDURE — C1768 GRAFT, VASCULAR: HCPCS | Performed by: SURGERY

## 2017-11-14 PROCEDURE — 74011250636 HC RX REV CODE- 250/636

## 2017-11-14 PROCEDURE — 77030008467 HC STPLR SKN COVD -B: Performed by: SURGERY

## 2017-11-14 PROCEDURE — 77030002996 HC SUT SLK J&J -A: Performed by: SURGERY

## 2017-11-14 PROCEDURE — 76210000020 HC REC RM PH II FIRST 0.5 HR: Performed by: SURGERY

## 2017-11-14 PROCEDURE — 77030002986 HC SUT PROL J&J -A: Performed by: SURGERY

## 2017-11-14 RX ORDER — LIDOCAINE HYDROCHLORIDE 20 MG/ML
INJECTION, SOLUTION EPIDURAL; INFILTRATION; INTRACAUDAL; PERINEURAL AS NEEDED
Status: DISCONTINUED | OUTPATIENT
Start: 2017-11-14 | End: 2017-11-14 | Stop reason: HOSPADM

## 2017-11-14 RX ORDER — OXYCODONE AND ACETAMINOPHEN 5; 325 MG/1; MG/1
1-2 TABLET ORAL
Qty: 30 TAB | Refills: 0 | Status: SHIPPED | OUTPATIENT
Start: 2017-11-14 | End: 2019-01-16

## 2017-11-14 RX ORDER — ONDANSETRON 2 MG/ML
INJECTION INTRAMUSCULAR; INTRAVENOUS AS NEEDED
Status: DISCONTINUED | OUTPATIENT
Start: 2017-11-14 | End: 2017-11-14 | Stop reason: HOSPADM

## 2017-11-14 RX ORDER — SODIUM CHLORIDE 0.9 % (FLUSH) 0.9 %
5-10 SYRINGE (ML) INJECTION AS NEEDED
Status: DISCONTINUED | OUTPATIENT
Start: 2017-11-14 | End: 2017-11-14 | Stop reason: HOSPADM

## 2017-11-14 RX ORDER — ACETAMINOPHEN 10 MG/ML
INJECTION, SOLUTION INTRAVENOUS AS NEEDED
Status: DISCONTINUED | OUTPATIENT
Start: 2017-11-14 | End: 2017-11-14 | Stop reason: HOSPADM

## 2017-11-14 RX ORDER — SODIUM CHLORIDE 9 MG/ML
25 INJECTION, SOLUTION INTRAVENOUS CONTINUOUS
Status: DISCONTINUED | OUTPATIENT
Start: 2017-11-14 | End: 2017-11-14 | Stop reason: HOSPADM

## 2017-11-14 RX ORDER — MORPHINE SULFATE 10 MG/ML
2 INJECTION, SOLUTION INTRAMUSCULAR; INTRAVENOUS
Status: DISCONTINUED | OUTPATIENT
Start: 2017-11-14 | End: 2017-11-14 | Stop reason: HOSPADM

## 2017-11-14 RX ORDER — FENTANYL CITRATE 50 UG/ML
25 INJECTION, SOLUTION INTRAMUSCULAR; INTRAVENOUS
Status: DISCONTINUED | OUTPATIENT
Start: 2017-11-14 | End: 2017-11-14 | Stop reason: HOSPADM

## 2017-11-14 RX ORDER — NEOSTIGMINE METHYLSULFATE 1 MG/ML
INJECTION INTRAVENOUS AS NEEDED
Status: DISCONTINUED | OUTPATIENT
Start: 2017-11-14 | End: 2017-11-14 | Stop reason: HOSPADM

## 2017-11-14 RX ORDER — SODIUM CHLORIDE, SODIUM LACTATE, POTASSIUM CHLORIDE, CALCIUM CHLORIDE 600; 310; 30; 20 MG/100ML; MG/100ML; MG/100ML; MG/100ML
25 INJECTION, SOLUTION INTRAVENOUS CONTINUOUS
Status: DISCONTINUED | OUTPATIENT
Start: 2017-11-14 | End: 2017-11-14 | Stop reason: HOSPADM

## 2017-11-14 RX ORDER — CEFAZOLIN SODIUM IN 0.9 % NACL 2 G/100 ML
2 PLASTIC BAG, INJECTION (ML) INTRAVENOUS
Status: COMPLETED | OUTPATIENT
Start: 2017-11-14 | End: 2017-11-14

## 2017-11-14 RX ORDER — KETAMINE HYDROCHLORIDE 100 MG/ML
INJECTION, SOLUTION INTRAMUSCULAR; INTRAVENOUS AS NEEDED
Status: DISCONTINUED | OUTPATIENT
Start: 2017-11-14 | End: 2017-11-14 | Stop reason: HOSPADM

## 2017-11-14 RX ORDER — SODIUM CHLORIDE 0.9 % (FLUSH) 0.9 %
5-10 SYRINGE (ML) INJECTION EVERY 8 HOURS
Status: DISCONTINUED | OUTPATIENT
Start: 2017-11-14 | End: 2017-11-14 | Stop reason: HOSPADM

## 2017-11-14 RX ORDER — HYDROMORPHONE HYDROCHLORIDE 1 MG/ML
.2-.5 INJECTION, SOLUTION INTRAMUSCULAR; INTRAVENOUS; SUBCUTANEOUS
Status: DISCONTINUED | OUTPATIENT
Start: 2017-11-14 | End: 2017-11-14 | Stop reason: HOSPADM

## 2017-11-14 RX ORDER — ROCURONIUM BROMIDE 10 MG/ML
INJECTION, SOLUTION INTRAVENOUS AS NEEDED
Status: DISCONTINUED | OUTPATIENT
Start: 2017-11-14 | End: 2017-11-14 | Stop reason: HOSPADM

## 2017-11-14 RX ORDER — FENTANYL CITRATE 50 UG/ML
INJECTION, SOLUTION INTRAMUSCULAR; INTRAVENOUS AS NEEDED
Status: DISCONTINUED | OUTPATIENT
Start: 2017-11-14 | End: 2017-11-14 | Stop reason: HOSPADM

## 2017-11-14 RX ORDER — MIDAZOLAM HYDROCHLORIDE 1 MG/ML
INJECTION, SOLUTION INTRAMUSCULAR; INTRAVENOUS AS NEEDED
Status: DISCONTINUED | OUTPATIENT
Start: 2017-11-14 | End: 2017-11-14 | Stop reason: HOSPADM

## 2017-11-14 RX ORDER — GLYCOPYRROLATE 0.2 MG/ML
INJECTION INTRAMUSCULAR; INTRAVENOUS AS NEEDED
Status: DISCONTINUED | OUTPATIENT
Start: 2017-11-14 | End: 2017-11-14 | Stop reason: HOSPADM

## 2017-11-14 RX ORDER — DIPHENHYDRAMINE HYDROCHLORIDE 50 MG/ML
12.5 INJECTION, SOLUTION INTRAMUSCULAR; INTRAVENOUS
Status: DISCONTINUED | OUTPATIENT
Start: 2017-11-14 | End: 2017-11-14 | Stop reason: HOSPADM

## 2017-11-14 RX ORDER — OXYCODONE AND ACETAMINOPHEN 5; 325 MG/1; MG/1
1-2 TABLET ORAL
Status: DISCONTINUED | OUTPATIENT
Start: 2017-11-14 | End: 2017-11-14 | Stop reason: HOSPADM

## 2017-11-14 RX ORDER — HYDROMORPHONE HYDROCHLORIDE 2 MG/ML
INJECTION, SOLUTION INTRAMUSCULAR; INTRAVENOUS; SUBCUTANEOUS AS NEEDED
Status: DISCONTINUED | OUTPATIENT
Start: 2017-11-14 | End: 2017-11-14 | Stop reason: HOSPADM

## 2017-11-14 RX ORDER — LIDOCAINE HYDROCHLORIDE 10 MG/ML
0.1 INJECTION, SOLUTION EPIDURAL; INFILTRATION; INTRACAUDAL; PERINEURAL AS NEEDED
Status: DISCONTINUED | OUTPATIENT
Start: 2017-11-14 | End: 2017-11-14 | Stop reason: HOSPADM

## 2017-11-14 RX ORDER — SODIUM CHLORIDE 900 MG/100ML
25 INJECTION INTRAVENOUS ONCE
Status: DISCONTINUED | OUTPATIENT
Start: 2017-11-14 | End: 2017-11-14 | Stop reason: HOSPADM

## 2017-11-14 RX ORDER — CEFAZOLIN SODIUM 1 G/3ML
2 INJECTION, POWDER, FOR SOLUTION INTRAMUSCULAR; INTRAVENOUS ONCE
Status: DISCONTINUED | OUTPATIENT
Start: 2017-11-14 | End: 2017-11-14

## 2017-11-14 RX ORDER — PROPOFOL 10 MG/ML
INJECTION, EMULSION INTRAVENOUS AS NEEDED
Status: DISCONTINUED | OUTPATIENT
Start: 2017-11-14 | End: 2017-11-14 | Stop reason: HOSPADM

## 2017-11-14 RX ORDER — DEXAMETHASONE SODIUM PHOSPHATE 4 MG/ML
INJECTION, SOLUTION INTRA-ARTICULAR; INTRALESIONAL; INTRAMUSCULAR; INTRAVENOUS; SOFT TISSUE AS NEEDED
Status: DISCONTINUED | OUTPATIENT
Start: 2017-11-14 | End: 2017-11-14 | Stop reason: HOSPADM

## 2017-11-14 RX ORDER — PHENYLEPHRINE HCL IN 0.9% NACL 0.4MG/10ML
SYRINGE (ML) INTRAVENOUS AS NEEDED
Status: DISCONTINUED | OUTPATIENT
Start: 2017-11-14 | End: 2017-11-14 | Stop reason: HOSPADM

## 2017-11-14 RX ADMIN — PROPOFOL 70 MG: 10 INJECTION, EMULSION INTRAVENOUS at 07:37

## 2017-11-14 RX ADMIN — ACETAMINOPHEN 1000 MG: 10 INJECTION, SOLUTION INTRAVENOUS at 09:01

## 2017-11-14 RX ADMIN — MIDAZOLAM HYDROCHLORIDE 2 MG: 1 INJECTION, SOLUTION INTRAMUSCULAR; INTRAVENOUS at 07:26

## 2017-11-14 RX ADMIN — NEOSTIGMINE METHYLSULFATE 3 MG: 1 INJECTION INTRAVENOUS at 12:05

## 2017-11-14 RX ADMIN — SODIUM CHLORIDE 25 ML/HR: 900 INJECTION, SOLUTION INTRAVENOUS at 07:01

## 2017-11-14 RX ADMIN — ONDANSETRON 4 MG: 2 INJECTION INTRAMUSCULAR; INTRAVENOUS at 12:05

## 2017-11-14 RX ADMIN — Medication 40 MCG: at 08:04

## 2017-11-14 RX ADMIN — Medication 40 MCG: at 07:58

## 2017-11-14 RX ADMIN — FENTANYL CITRATE 25 MCG: 50 INJECTION, SOLUTION INTRAMUSCULAR; INTRAVENOUS at 13:31

## 2017-11-14 RX ADMIN — FENTANYL CITRATE 100 MCG: 50 INJECTION, SOLUTION INTRAMUSCULAR; INTRAVENOUS at 07:34

## 2017-11-14 RX ADMIN — FENTANYL CITRATE 25 MCG: 50 INJECTION, SOLUTION INTRAMUSCULAR; INTRAVENOUS at 13:05

## 2017-11-14 RX ADMIN — HYDROMORPHONE HYDROCHLORIDE 0.2 MG: 2 INJECTION, SOLUTION INTRAMUSCULAR; INTRAVENOUS; SUBCUTANEOUS at 12:23

## 2017-11-14 RX ADMIN — OXYCODONE HYDROCHLORIDE AND ACETAMINOPHEN 1 TABLET: 5; 325 TABLET ORAL at 13:18

## 2017-11-14 RX ADMIN — DEXAMETHASONE SODIUM PHOSPHATE 10 MG: 4 INJECTION, SOLUTION INTRA-ARTICULAR; INTRALESIONAL; INTRAMUSCULAR; INTRAVENOUS; SOFT TISSUE at 08:35

## 2017-11-14 RX ADMIN — GLYCOPYRROLATE 0.4 MG: 0.2 INJECTION INTRAMUSCULAR; INTRAVENOUS at 12:05

## 2017-11-14 RX ADMIN — CEFAZOLIN 2 G: 10 INJECTION, POWDER, FOR SOLUTION INTRAVENOUS; PARENTERAL at 07:56

## 2017-11-14 RX ADMIN — LIDOCAINE HYDROCHLORIDE 100 MG: 20 INJECTION, SOLUTION EPIDURAL; INFILTRATION; INTRACAUDAL; PERINEURAL at 07:37

## 2017-11-14 RX ADMIN — FENTANYL CITRATE 25 MCG: 50 INJECTION, SOLUTION INTRAMUSCULAR; INTRAVENOUS at 13:38

## 2017-11-14 RX ADMIN — FENTANYL CITRATE 25 MCG: 50 INJECTION, SOLUTION INTRAMUSCULAR; INTRAVENOUS at 12:51

## 2017-11-14 RX ADMIN — ROCURONIUM BROMIDE 50 MG: 10 INJECTION, SOLUTION INTRAVENOUS at 07:37

## 2017-11-14 RX ADMIN — Medication 40 MCG: at 08:08

## 2017-11-14 RX ADMIN — KETAMINE HYDROCHLORIDE 50 MG: 100 INJECTION, SOLUTION INTRAMUSCULAR; INTRAVENOUS at 07:37

## 2017-11-14 NOTE — BRIEF OP NOTE
BRIEF OPERATIVE NOTE    Date of Procedure: 11/14/2017   Preoperative Diagnosis: ESRD  Postoperative Diagnosis: End Stage Renal Disease    Procedure(s):  INSERTION OF AV GRAFT RIGHT ARM (bovine) , LIGATION OF AV FISTULA RIGHT ARM  Surgeon(s) and Role:     * Bibi Rubio MD - Primary         Assistant Staff:       Surgical Staff:  Circ-1: Fannin Petit  Circ-Relief: Lydia Hung RN  Scrub Tech-1: Allison Paz  Surg Asst-1: Berhane Das  Event Time In   Incision Start 6581   Incision Close 1229     Anesthesia: General   Estimated Blood Loss: 75 ml  Specimens: * No specimens in log *   Findings: Palpable thrill in new AV graft and palpable pulse in radial artery at the wrist post op. No doppler flow in ligated old AVF. Complications: none  Implants:   Implant Name Type Inv.  Item Serial No.  Lot No. LRB No. Used Action   GRAFT VASCULAR [id number] BOVINE - [id number] Graft GRAFT VASCULAR [id number] BOVINE [id number] [employer] [id number] Right 1 Implanted

## 2017-11-14 NOTE — ROUTINE PROCESS
Patient: Devika Torres MRN: [id number]  SSN: [id number]   YOB: 1975  Age: 38 y.o. Sex: male     Patient is status post Procedure(s):  INSERTION OF AV GRAFT RIGHT ARM, LIGATION OF AV FISTULA RIGHT ARM. Surgeon(s) and Role:     * Bruce Corbin MD - Primary                    Peripheral IV 11/14/17 Left Wrist (Active)   Site Assessment Clean, dry, & intact 11/14/2017  6:49 AM   Phlebitis Assessment 0 11/14/2017  6:49 AM   Infiltration Assessment 0 11/14/2017  6:49 AM   Dressing Status Clean, dry, & intact 11/14/2017  6:49 AM   Dressing Type Tape;Transparent 11/14/2017  6:49 AM   Hub Color/Line Status Blue; Infusing;Patent 11/14/2017  6:49 AM   Action Taken Blood drawn 11/14/2017  6:49 AM   Alcohol Cap Used Yes 11/14/2017  6:49 AM        Hemodialysis Catheter  (Active)   Central Line Being Utilized Yes 11/14/2017  7:20 AM   Criteria for Appropriate Use Dialysis/apheresis 11/14/2017  7:20 AM   Site Assessment Clean, dry, & intact 11/14/2017  7:20 AM   Date of Last Dressing Change 11/13/17 11/14/2017  7:20 AM   Dressing Status Clean, dry, & intact 11/14/2017  7:20 AM   Dressing Type 4 X 4;Tape 11/14/2017  7:20 AM   Proximal Hub Color/Line Status Blue;Capped 11/14/2017  7:20 AM   Distal Hub Color/Line Status Red;Capped 11/14/2017  7:20 AM        Airway - Endotracheal Tube 11/14/17 Oral (Active)   Line Callum Lips 11/14/2017 12:00 AM                   Dressing/Packing:  Wound Arm Right-DRESSING TYPE: 4 x 4;Special tape (comment) (11/14/17 1224)  Splint/Cast:  ]

## 2017-11-14 NOTE — PERIOP NOTES
0715 - patients blood glucose on ISTAT 74, patient is not diabetic and is not having any signs or symptoms such as dizziness or lightheadedness when ask patient. Notified Dr. Barbara Morejon who stated no new orders and OK to not give D50.

## 2017-11-14 NOTE — DISCHARGE INSTRUCTIONS
Discharge Instructions Following AV Graft Surgery            Keep dressing in place for two days. Keep incision dry for two days. See Dr. Zahra Meadows in the office in two weeks - call for appointment - [phone]. Take pain medications as prescribed as needed. Do not drive while taking narcotic pain medication. Resume Aspirin on Wednesday 11/15/2017. Resume warfarin (Coumadin) on Thursday 11/16/2017. Resume clopidogrel (Plavix) on Friday 11/17/2017. For any problems, call Dr. Zahra Meadows at [phone] or Gricelda Pal MD        DISCHARGE SUMMARY from Nurse    PATIENT INSTRUCTIONS:    After general anesthesia or intravenous sedation, for 24 hours or while taking prescription Narcotics:  · Limit your activities  · Do not drive and operate hazardous machinery  · Do not make important personal or business decisions  · Do  not drink alcoholic beverages  · If you have not urinated within 8 hours after discharge, please contact your surgeon on call. Report the following to your surgeon:  · Excessive pain, swelling, redness or odor of or around the surgical area  · Temperature over 100.5  · Nausea and vomiting lasting longer than 4 hours or if unable to take medications  · Any signs of decreased circulation or nerve impairment to extremity: change in color, persistent  numbness, tingling, coldness or increase pain  · Any questions      These are general instructions for a healthy lifestyle:    No smoking/ No tobacco products/ Avoid exposure to second hand smoke  Surgeon General's Warning:  Quitting smoking now greatly reduces serious risk to your health.     Obesity, smoking, and sedentary lifestyle greatly increases your risk for illness    A healthy diet, regular physical exercise & weight monitoring are important for maintaining a healthy lifestyle    You may be retaining fluid if you have a history of heart failure or if you experience any of the following symptoms:  Weight gain of 3 pounds or more overnight or 5 pounds in a week, increased swelling in our hands or feet or shortness of breath while lying flat in bed. Please call your doctor as soon as you notice any of these symptoms; do not wait until your next office visit. Recognize signs and symptoms of STROKE:    F-face looks uneven    A-arms unable to move or move unevenly    S-speech slurred or non-existent    T-time-call 911 as soon as signs and symptoms begin-DO NOT go       Back to bed or wait to see if you get better-TIME IS BRAIN. Warning Signs of HEART ATTACK     Call 911 if you have these symptoms:   Chest discomfort. Most heart attacks involve discomfort in the center of the chest that lasts more than a few minutes, or that goes away and comes back. It can feel like uncomfortable pressure, squeezing, fullness, or pain.  Discomfort in other areas of the upper body. Symptoms can include pain or discomfort in one or both arms, the back, neck, jaw, or stomach.  Shortness of breath with or without chest discomfort.  Other signs may include breaking out in a cold sweat, nausea, or lightheadedness. Don't wait more than five minutes to call 911 - MINUTES MATTER! Fast action can save your life. Calling 911 is almost always the fastest way to get lifesaving treatment. Emergency Medical Services staff can begin treatment when they arrive -- up to an hour sooner than if someone gets to the hospital by car. The discharge information has been reviewed with the patient and guardian(Aunt)  The patient and guardian verbalized understanding. Discharge medications reviewed with the patient and guardian and appropriate educational materials and side effects teaching were provided.   ___________________________________________________________________________________________________________________________________

## 2017-11-14 NOTE — ANESTHESIA PREPROCEDURE EVALUATION
Anesthetic History     PONV          Review of Systems / Medical History  Patient summary reviewed, nursing notes reviewed and pertinent labs reviewed    Pulmonary  Within defined limits                 Neuro/Psych     seizures         Cardiovascular    Hypertension: poorly controlled      CHF  Dysrhythmias   Pacemaker, past MI, CAD, PAD, cardiac stents and hyperlipidemia    Exercise tolerance: <4 METS  Comments: V Tach  DVT  EF 30-35%   GI/Hepatic/Renal     GERD: well controlled    Renal disease: ESRD and dialysis       Endo/Other        Blood dyscrasia     Other Findings            Physical Exam    Airway  Mallampati: III  TM Distance: > 6 cm  Neck ROM: normal range of motion   Mouth opening: Normal     Cardiovascular    Rhythm: regular  Rate: normal    Murmur     Dental    Dentition: Poor dentition     Pulmonary  Breath sounds clear to auscultation               Abdominal  GI exam deferred       Other Findings            Anesthetic Plan    ASA: 4  Anesthesia type: general    Monitoring Plan: BIS      Induction: Intravenous  Anesthetic plan and risks discussed with: Patient

## 2017-11-14 NOTE — PERIOP NOTES
Handoff Report from Operating Room to PACU    Report received from [facility] and Edmar Ernandez CRNA regarding Deepti Cardoso. Surgeon(s):  John Hansen MD  And Procedure(s) (LRB):  INSERTION OF AV GRAFT RIGHT ARM, LIGATION OF AV FISTULA RIGHT ARM (Right)  confirmed   with allergies and dressings discussed. Anesthesia type, drugs, patient history, complications, estimated blood loss, vital signs, intake and output, and last pain medication and reversal medications were reviewed.

## 2017-11-14 NOTE — ANESTHESIA POSTPROCEDURE EVALUATION
Post-Anesthesia Evaluation and Assessment    Patient: Ravi Spencer MRN: [id number]  SSN: [id number]    YOB: 1975  Age: 38 y.o. Sex: male       Cardiovascular Function/Vital Signs  Visit Vitals    /74    Pulse 85    Temp 36.8 °C (98.2 °F)    Resp 15    Ht 5' 7\" (1.702 m)    Wt 66.3 kg (146 lb 2.6 oz)    SpO2 97%    BMI 22.89 kg/m2       Patient is status post general anesthesia for Procedure(s):  INSERTION OF AV GRAFT RIGHT ARM, LIGATION OF AV FISTULA RIGHT ARM. Nausea/Vomiting: None    Postoperative hydration reviewed and adequate. Pain:  Pain Scale 1: Numeric (0 - 10) (11/14/17 1305)  Pain Intensity 1: 5 (11/14/17 1305)   Managed    Neurological Status:   Neuro (WDL): Within Defined Limits (11/14/17 1242)  Neuro  Neurologic State: Alert (11/14/17 1916)  Orientation Level: Appropriate for age;Oriented X4 (11/14/17 0123)  Cognition: Appropriate decision making (11/14/17 9249)  Speech: Appropriate for age;Clear (11/14/17 9506)  LUE Motor Response: Purposeful (11/14/17 3420)  LLE Motor Response: Purposeful (11/14/17 1237)  RUE Motor Response: Purposeful (11/14/17 6434)  RLE Motor Response: Purposeful (11/14/17 1870)   At baseline    Mental Status and Level of Consciousness: Arousable    Pulmonary Status:   O2 Device: Room air (11/14/17 1305)   Adequate oxygenation and airway patent    Complications related to anesthesia: None    Post-anesthesia assessment completed.  No concerns    Signed By: Vianey Dye MD     November 14, 2017

## 2017-11-14 NOTE — IP AVS SNAPSHOT
[address] [facility] 
[phone] Patient: Eryn Kerr MRN: [id number] :1975 About your hospitalization You were admitted on:  2017 You last received care in the:  [facility] PACU You were discharged on:  2017 Why you were hospitalized Your primary diagnosis was:  Not on File Things You Need To Do (next 8 weeks) Follow up with Quang Bruno MD  
  
Phone:  [phone] Where:  [address] [address]  POST OP with Mer Santana MD at  9:00 AM  
Where:  [facility] Dr. Barrera Dewey [facility] ([address]) Discharge Orders None A check jeane indicates which time of day the medication should be taken. My Medications TAKE these medications as instructed Instructions Each Dose to Equal  
 Morning Noon Evening Bedtime  
 amLODIPine 2.5 mg tablet Commonly known as:  Kwaku Navarro Your last dose was: Your next dose is: Take 2.5 mg by mouth daily. 2.5 mg  
    
   
   
   
  
 aspirin 81 mg chewable tablet Your last dose was: Your next dose is: Take 81 mg by mouth daily. 81 mg  
    
   
   
   
  
 calcitRIOL 0.5 mcg capsule Commonly known as:  ROCALTROL Your last dose was: Your next dose is: Take 0.5 mcg by mouth daily. 0.5 mcg  
    
   
   
   
  
 * HYDROcodone-acetaminophen 5-325 mg per tablet Commonly known as:  Dakota Songster Your last dose was: Your next dose is: Take 1 Tab by mouth every four (4) hours as needed for Pain. Max Daily Amount: 6 Tabs. 1 Tab  
    
   
   
   
  
 * HYDROcodone-acetaminophen 5-325 mg per tablet Commonly known as:  Dakota Songster Your last dose was: Your next dose is: Take 1 Tab by mouth every four (4) hours as needed for Pain. Max Daily Amount: 6 Tabs. 1 Tab KEPPRA 750 mg tablet Generic drug:  levETIRAcetam  
   
Your last dose was: Your next dose is: Take 750 mg by mouth two (2) times a day. 750 mg  
    
   
   
   
  
 LIPITOR 20 mg tablet Generic drug:  atorvastatin Your last dose was: Your next dose is: Take 20 mg by mouth daily. 20 mg  
    
   
   
   
  
 omeprazole 20 mg capsule Commonly known as:  PRILOSEC Your last dose was: Your next dose is: Take 20 mg by mouth daily. 20 mg  
    
   
   
   
  
 ondansetron 4 mg disintegrating tablet Commonly known as:  ZOFRAN ODT Your last dose was: Your next dose is: Take 1 Tab by mouth every eight (8) hours as needed for Nausea. 4 mg  
    
   
   
   
  
 oxyCODONE-acetaminophen 5-325 mg per tablet Commonly known as:  PERCOCET Your last dose was: Your next dose is: Take 1-2 Tabs by mouth every four (4) hours as needed for Pain. Max Daily Amount: 12 Tabs. 1-2 Tab PLAVIX 75 mg Tab Generic drug:  clopidogrel Your last dose was: Your next dose is: Take  by mouth daily. RENVELA 800 mg Tab tab Generic drug:  sevelamer carbonate Your last dose was: Your next dose is: Take 2,400 mg by mouth three (3) times daily (with meals). 2400 mg  
    
   
   
   
  
 VIMPAT 50 mg Tab tablet Generic drug:  lacosamide Your last dose was: Your next dose is: Take 50 mg by mouth two (2) times a day. 50 mg  
    
   
   
   
  
 warfarin 5 mg tablet Commonly known as:  COUMADIN Your last dose was: Your next dose is:    
   
   
 Decrease coumadin dose to 2.5mg while taking these antibiotics.  Recheck INR every 2 days until you finish the antibiotics. * Notice: This list has 2 medication(s) that are the same as other medications prescribed for you. Read the directions carefully, and ask your doctor or other care provider to review them with you. Where to Get Your Medications Information on where to get these meds will be given to you by the nurse or doctor. ! Ask your nurse or doctor about these medications  
  oxyCODONE-acetaminophen 5-325 mg per tablet Discharge Instructions Discharge Instructions Following AV Graft Surgery Keep dressing in place for two days. Keep incision dry for two days. See Dr. Nicholas Garces in the office in two weeks - call for appointment - [phone]. Take pain medications as prescribed as needed. Do not drive while taking narcotic pain medication. Resume Aspirin on Wednesday 11/15/2017. Resume warfarin (Coumadin) on Thursday 11/16/2017. Resume clopidogrel (Plavix) on Friday 11/17/2017. For any problems, call Dr. Nicholas Garces at [phone] or [phone] Cuba Villegas MD 
 
 
 
DISCHARGE SUMMARY from Nurse PATIENT INSTRUCTIONS: 
 
After general anesthesia or intravenous sedation, for 24 hours or while taking prescription Narcotics: · Limit your activities · Do not drive and operate hazardous machinery · Do not make important personal or business decisions · Do  not drink alcoholic beverages · If you have not urinated within 8 hours after discharge, please contact your surgeon on call. Report the following to your surgeon: 
· Excessive pain, swelling, redness or odor of or around the surgical area · Temperature over 100.5 · Nausea and vomiting lasting longer than 4 hours or if unable to take medications · Any signs of decreased circulation or nerve impairment to extremity: change in color, persistent  numbness, tingling, coldness or increase pain · Any questions These are general instructions for a healthy lifestyle: No smoking/ No tobacco products/ Avoid exposure to second hand smoke Surgeon General's Warning:  Quitting smoking now greatly reduces serious risk to your health. Obesity, smoking, and sedentary lifestyle greatly increases your risk for illness A healthy diet, regular physical exercise & weight monitoring are important for maintaining a healthy lifestyle You may be retaining fluid if you have a history of heart failure or if you experience any of the following symptoms:  Weight gain of 3 pounds or more overnight or 5 pounds in a week, increased swelling in our hands or feet or shortness of breath while lying flat in bed. Please call your doctor as soon as you notice any of these symptoms; do not wait until your next office visit. Recognize signs and symptoms of STROKE: 
 
F-face looks uneven A-arms unable to move or move unevenly S-speech slurred or non-existent T-time-call 911 as soon as signs and symptoms begin-DO NOT go Back to bed or wait to see if you get better-TIME IS BRAIN. Warning Signs of HEART ATTACK Call 911 if you have these symptoms: 
? Chest discomfort. Most heart attacks involve discomfort in the center of the chest that lasts more than a few minutes, or that goes away and comes back. It can feel like uncomfortable pressure, squeezing, fullness, or pain. ? Discomfort in other areas of the upper body. Symptoms can include pain or discomfort in one or both arms, the back, neck, jaw, or stomach. ? Shortness of breath with or without chest discomfort. ? Other signs may include breaking out in a cold sweat, nausea, or lightheadedness. Don't wait more than five minutes to call [address]! Fast action can save your life. Calling 911 is almost always the fastest way to get lifesaving treatment.  Emergency Medical Services staff can begin treatment when they arrive  up to an hour sooner than if someone gets to the hospital by car. The discharge information has been reviewed with the patient and guardian(Aunt)  The patient and guardian verbalized understanding. Discharge medications reviewed with the patient and guardian and appropriate educational materials and side effects teaching were provided. ___________________________________________________________________________________________________________________________________ Introducing [facility]! Dear Candida Agrawal: Thank you for requesting a [employer] account. Our records indicate that you already have an active [employer] account. You can access your account anytime at [email or web address] Did you know that you can access your hospital and ER discharge instructions at any time in [employer]? You can also review all of your test results from your hospital stay or ER visit. Additional Information If you have questions, please visit the Frequently Asked Questions section of the [employer] website at [email or web address] Remember, [employer] is NOT to be used for urgent needs. For medical emergencies, dial 911. Now available from your iPhone and Android! 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 No rash or hives per patient report 1/14/2012 Recent Documentation Height Weight BMI Smoking Status 1.702 m 66.3 kg 22.89 kg/m2 Never Smoker Emergency Contacts Name Discharge Info Relation Home Work Mobile [facility] DISCHARGE CAREGIVER [3] Sister [23] [phone] [address] CAREGIVER [3] Other Relative [6] [phone] Patient Belongings The following personal items are in your possession at time of discharge: 
  Dental Appliances: None  Visual Aid: None   Hearing Aids/Status: Does not own  Home Medications: None   Jewelry: None  Clothing: Other (comment) (street clothes and shoes )    Other Valuables: Wallet (wallet with ID cards and insurance cards)  Personal Items Sent to Safe: declined Please provide this summary of care documentation to your next provider. Signatures-by signing, you are acknowledging that this After Visit Summary has been reviewed with you and you have received a copy. Patient Signature:  ____________________________________________________________ Date:  ____________________________________________________________  
  
Radha Stephens Provider Signature:  ____________________________________________________________ Date:  ____________________________________________________________

## 2017-11-14 NOTE — IP AVS SNAPSHOT
[address] [facility] 
[phone] Patient: Wendy Duggan MRN: [id number] :1975 My Medications TAKE these medications as instructed Instructions Each Dose to Equal  
 Morning Noon Evening Bedtime  
 amLODIPine 2.5 mg tablet Commonly known as:  Nevin Mera Your last dose was: Your next dose is: Take 2.5 mg by mouth daily. 2.5 mg  
    
   
   
   
  
 aspirin 81 mg chewable tablet Your last dose was: Your next dose is: Take 81 mg by mouth daily. 81 mg  
    
   
   
   
  
 calcitRIOL 0.5 mcg capsule Commonly known as:  ROCALTROL Your last dose was: Your next dose is: Take 0.5 mcg by mouth daily. 0.5 mcg  
    
   
   
   
  
 * HYDROcodone-acetaminophen 5-325 mg per tablet Commonly known as:  Liz Mura Your last dose was: Your next dose is: Take 1 Tab by mouth every four (4) hours as needed for Pain. Max Daily Amount: 6 Tabs. 1 Tab  
    
   
   
   
  
 * HYDROcodone-acetaminophen 5-325 mg per tablet Commonly known as:  Liz Mura Your last dose was: Your next dose is: Take 1 Tab by mouth every four (4) hours as needed for Pain. Max Daily Amount: 6 Tabs. 1 Tab KEPPRA 750 mg tablet Generic drug:  levETIRAcetam  
   
Your last dose was: Your next dose is: Take 750 mg by mouth two (2) times a day. 750 mg  
    
   
   
   
  
 LIPITOR 20 mg tablet Generic drug:  atorvastatin Your last dose was: Your next dose is: Take 20 mg by mouth daily. 20 mg  
    
   
   
   
  
 omeprazole 20 mg capsule Commonly known as:  PRILOSEC Your last dose was: Your next dose is: Take 20 mg by mouth daily. 20 mg  
    
   
   
   
  
 ondansetron 4 mg disintegrating tablet Commonly known as:  ZOFRAN ODT Your last dose was: Your next dose is: Take 1 Tab by mouth every eight (8) hours as needed for Nausea. 4 mg  
    
   
   
   
  
 oxyCODONE-acetaminophen 5-325 mg per tablet Commonly known as:  PERCOCET Your last dose was: Your next dose is: Take 1-2 Tabs by mouth every four (4) hours as needed for Pain. Max Daily Amount: 12 Tabs. 1-2 Tab PLAVIX 75 mg Tab Generic drug:  clopidogrel Your last dose was: Your next dose is: Take  by mouth daily. RENVELA 800 mg Tab tab Generic drug:  sevelamer carbonate Your last dose was: Your next dose is: Take 2,400 mg by mouth three (3) times daily (with meals). 2400 mg  
    
   
   
   
  
 VIMPAT 50 mg Tab tablet Generic drug:  lacosamide Your last dose was: Your next dose is: Take 50 mg by mouth two (2) times a day. 50 mg  
    
   
   
   
  
 warfarin 5 mg tablet Commonly known as:  COUMADIN Your last dose was: Your next dose is:    
   
   
 Decrease coumadin dose to 2.5mg while taking these antibiotics. Recheck INR every 2 days until you finish the antibiotics. * Notice: This list has 2 medication(s) that are the same as other medications prescribed for you. Read the directions carefully, and ask your doctor or other care provider to review them with you. Where to Get Your Medications Information on where to get these meds will be given to you by the nurse or doctor. ! Ask your nurse or doctor about these medications  
  oxyCODONE-acetaminophen 5-325 mg per tablet

## 2017-11-15 NOTE — OP NOTES
[address] [address] [address]   OP NOTE       Name:  Dottie Fisher   MR#:  [id number]   :  1975   Account #:  [de-identified]    Surgery Date:  2017   Date of Adm:  2017       DATE OF PROCEDURE: 2017. PREOPERATIVE DIAGNOSIS:   1. Stenosis and pseudoaneurysm of arteriovenous fistula, right arm. 2. End-stage renal disease. POSTOPERATIVE DIAGNOSIS:   1. Stenosis and pseudoaneurysm of arteriovenous fistula, right arm. 2. End-stage renal disease. PROCEDURES PERFORMED:   1. Insertion of arteriovenous graft, right arm. 2. Ligation of arteriovenous fistula, right arm. SURGEON: Lilian Galloway MD.    ANESTHESIA: General.    ESTIMATED BLOOD LOSS: 75 mL. SPECIMEN REMOVED: None. DRAINS: None. DESCRIPTION OF PROCEDURE: The patient was taken to the   operating room and placed on the operating table in supine position. The patient's right upper extremity was sterilely prepared and draped. The patient has a history of heparin-induced thrombocytopenia   and therefore, no heparin was utilized during the procedure. There was an existing transposed basilic arteriovenous fistula in the   right upper arm. He had 2 stents within the transposed portion and   there was a pseudoaneurysm in a portion of the fistula. Because of difficulty finding adequate puncture zone length, he was   presenting today for insertion of an arteriovenous graft and ligation of   the fistula. An incision was made at the area of surgical scarring in the distal   medial aspect of the right upper arm. Incision was carried down to the   beginning portion of the transposed AV fistula. There was a good deal   of dense scar area. Tedious dissection was required to isolate about 2   cm of the beginning portion of the transposed AV fistula below where it   had pseudoaneurysm. This area was surrounded by vessel loops.  It   was irrigated with antibiotic solution and covered by moist saline   gauze. Attention was then turned to the right axillary and upper arm regions. I   had looked at both the axillary area and the lower anastomotic area   with ultrasound at the beginning of surgery. There was a stent in the   transposed vein in the swing zone, but the stent ended at the upper   Arm close to the axilla. Above the stent there was a normal-appearing brachial   vein, which was around 10 mm diameter. An incision was made in the upper arm, extending to the axilla. This   was carried down through subcutaneous tissue. The underlying   brachial vein was identified and was freed for about a 2.5 cm distance   and surrounded proximally and distally. In addition to the mobilized   transposed basilic vein there was native inflow through a smaller vein   and a side branch and these were kept intact. The patient then had a profunda clamp placed on the old transposed   basilic AV fistula just above the arterial anastomosis. He maintained a   good radial pulse distally. Manual compression was placed on the   fistula and a longitudinal venotomy was made in the beginning portion   of the transposed fistula. I then used a #4 Vick catheter inflated with   saline to provide backflow control. Prior to placement of that balloon, I   had flushed the fistula with about 60 mL of saline. The bovine graft had   been prepared; this was a 6 mm graft. The graft was then cut slightly   obliquely and was anastomosed pas-mstba-gy-side -fistulotomy in the lower   portion of the old transposed basilic AV fistula with 6-0 Prolene. I used   frequent irrigations with plain lactated Ringer's to keep the area free of   any clot. Once that anastomosis was nearly completed, then the bovine graft was then drawn   through a tunnel which had been created along the outer aspect of the existing   fistula utilizing a [place] tunneler.  The graft was drawn through the   tunneler sheath and the sheath was removed. I had marked the graft for  orientation. I did not complete the anastomosis at the lower end, but   left a few sutures loose so I could periodically flush that area   with lactated Ringer's. The clamp was maintained, which did allow   native artery flow. Attention was then turned to the axillary area. The vein was controlled   by vessel loops and then was opened longitudinally. The interior was   flushed with lactated Ringer's. The graft was cut to length and was   anastomosed nny-jjtna-hk-side brachial vein in the lower axilla with 6-0   Prolene. I left this anastomosis untied initially. I then again irrigated the   lower anastomotic area and with manual compression on the fistula,   geoffrey out the Vick catheter. I then tied the 6-0 Prolene on the lower   anastomosis. The graft was then flushed out through the open upper   anastomosis and there was excellent inflow. I then released all   the vessel loops and tied the upper anastomotic suture line. There was   a good flow, good thrill, and good Doppler signal in the AV graft. I then completed a small amount of dissection just above the new more   distal anastomosis and ligated the existing AV fistula just above that   new anastomosis with 2-0 silk. With this, there was an excellent thrill and Doppler   signal in the new AV graft. There was a palpable radial pulse distally. No doppler flow was detected in the old AV fistula. Wounds were all irrigated with antibiotic solution and closed in   subcutaneous layers with 3-0 chromic. Skin was closed at the lower   incision with 4-0 nylon and the upper incision with surgical staples. Dressing was applied to both sites. The patient tolerated surgery well and was taken to recovery room in   stable condition.      Complications:  None        MD EULA Patino / Timur Love   D:  11/14/2017   16:40   T:  11/15/2017   05:42   Job #:  [id number]

## 2017-11-24 NOTE — OP NOTES
[facility]   [address] [facility], [address]   OP NOTE       Name:  Azar Miller   MR#:  [id number]   :  1975   Account #:  [de-identified]    Surgery Date:  2017   Date of Adm:  2017       PREOPERATIVE DIAGNOSIS: End-stage renal disease. POSTOPERATIVE DIAGNOSIS: End-stage renal disease. PROCEDURES PERFORMED: Insertion of arteriovenous graft, right   upper arm (bovine), ligation of arteriovenous fistula, right arm. ANESTHESIA: General.    SURGEON: Lennox Hoist, MD    ESTIMATED BLOOD LOSS: 75 mL. COMPLICATIONS: None. SPECIMENS REMOVED: None. DRAINS: None. DESCRIPTION OF PROCEDURE: The patient was taken to the   operating room, placed on the operating table in supine position. The   patient's right upper extremity was sterilely prepared and draped. There was an existing transposed basilic arteriovenous fistula in the   right upper arm, with multiple areas of stricture and multiple stents   being in place. The patient presented today for ligation of that fistula,   and placement of an arteriovenous graft. An incision was made near the antecubital level, where the initial   portion of the transposed basilic AV fistula was located. This area was   freed circumferentially. I compressed the fistula with DeBakey forceps,   the fistula flow ceased, and this was confirmed by Doppler. Flow was   then allowed to resume. A segment of the initial portion of the fistula   about 2 cm long was freed and surrounded by vessel loops. This   wound was then covered by antibiotic-soaked gauze. An incision was made medially and proximally on the right upper arm,   close to the axilla. The transposed fistula vein returned to its normal   anatomical location in that area, this being the brachial vein. The vein   there was about a centimeter in diameter. The vein was surrounded by   a vessel loop.     THE PATIENT HAD A HISTORY OF HEPARIN-INDUCED   THROMBOCYTOPENIA, and therefore, no heparin was utilized during   the procedure. A profunda clamp was utilized to clamp the existing AV fistula, just   above the arterial anastomosis. The fistula vein in the lower part of the   upper arm was opened longitudinally for a short distance, the fistula   above that level was flushed with saline, and then was controlled with   a balloon catheter. A bovine Graft, 6 mm in diameter, had been   prepared. This was then anastomosed with running suture of 6-0   Prolene end graft to side old AV fistula. A portion of this anastomosis,   however, was left open, so that it could be flushed as needed with   saline. The AV graft had been marked and had been drawn through a   very shallow subcutaneous tunnel created with a Stuyvesant tunneler, just to   the outer aspect of the old AV fistula. The graft was thus brought   through the tunneler sheath up to the uppermost incision, and the   sheath was removed. The graft was flushed from time to time with   normal saline. Control was then obtained of the native outflow vein in   its normal anatomical position with vascular clamps. The vein was   opened longitudinally and was flushed with saline. The end of the graft   was cut to length and was anastomosed end graft to side vein with 6-0   Prolene. Initially, prior to completion, the graft was flushed with saline,   and each of the anastomoses, which were still open, were flushed with   saline. I then completed the lower anastomosis, and flushed native   arterial flow out through the upper end of the AV graft. There was good   inflow. Control was obtained with a vascular clamp on the AV graft. The anastomotic region of the vein was flushed with saline, and then   that anastomosis was completed, the venous control was opened and   the clamp was taken off the AV graft, initiation of flow was allowed. There was a good thrill in the AV graft. There was no excess pulsatility. I then ligated the old AV fistula a short distance above the lower   anastomosis of the AV graft utilizing 2-0 silk. The wounds were all   irrigated with antibiotic solution. There was a good palpable radial   pulse at the wrist. The wounds were then closed with 3-0 chromic in   the subcutaneous tissue and 4-0 nylon on the skin. Dressings were   applied. The patient tolerated the surgery, and was taken to the   recovery room in stable condition.          MD Tommy Nguyen / [facility]   D:  11/24/2017   12:26   T:  11/24/2017   12:55   Job #:  [id number]

## 2017-11-25 ENCOUNTER — HOSPITAL ENCOUNTER (EMERGENCY)
Age: 42
Discharge: HOME | End: 2017-11-25
Attending: EMERGENCY MEDICINE
Payer: MEDICARE

## 2017-11-25 VITALS
SYSTOLIC BLOOD PRESSURE: 116 MMHG | OXYGEN SATURATION: 99 % | WEIGHT: 143.3 LBS | DIASTOLIC BLOOD PRESSURE: 74 MMHG | HEIGHT: 67 IN | RESPIRATION RATE: 16 BRPM | TEMPERATURE: 98.9 F | BODY MASS INDEX: 22.49 KG/M2

## 2017-11-25 DIAGNOSIS — Z99.2: ICD-10-CM

## 2017-11-25 DIAGNOSIS — M79.621 PAIN OF RIGHT UPPER ARM: Primary | ICD-10-CM

## 2017-11-25 PROCEDURE — 99283 EMERGENCY DEPT VISIT LOW MDM: CPT

## 2017-11-25 PROCEDURE — 74011250637 HC RX REV CODE- 250/637: Performed by: PHYSICIAN ASSISTANT

## 2017-11-25 RX ORDER — OXYCODONE AND ACETAMINOPHEN 5; 325 MG/1; MG/1
1 TABLET ORAL
Status: COMPLETED | OUTPATIENT
Start: 2017-11-25 | End: 2017-11-25

## 2017-11-25 RX ADMIN — OXYCODONE HYDROCHLORIDE AND ACETAMINOPHEN 1 TABLET: 5; 325 TABLET ORAL at 18:08

## 2017-11-25 NOTE — ED NOTES
Pt given discharge instructions from PA and verbalized understanding. Pt ambulatory to exit with a steady gait.

## 2017-11-25 NOTE — DISCHARGE INSTRUCTIONS
Hemodialysis Access: What to Expect at [facility]  Hemodialysis is a way to remove wastes from the blood when your kidneys can no longer do the job. It is not a cure, but it can help you live longer and feel better. It is a lifesaving treatment when you have kidney failure. Hemodialysis is often called dialysis. Your doctor created a place (called an access) in your arm for your blood to flow in and out of your body during your dialysis sessions. Your arm will probably be bruised and swollen. It may hurt. The cut (incision) may bleed. The pain and bleeding will get better over several days. You will probably need only over-the-counter pain medicine. You can reduce swelling by propping your arm on 1 or 2 pillows and keeping your elbow straight. You will have stitches. These may dissolve on their own, or your doctor will tell you when to come in to have them removed. You should also be able to return to work in a few days. You may feel some coolness or numbness in your hand. These feelings usually go away in a few weeks. Your doctor may suggest squeezing a soft object. This will strengthen your access and may make hemodialysis faster and easier. You should always be able to feel blood rushing through the fistula or graft. It feels like a slight vibration when you put your fingers on the skin over the fistula or graft. This feeling is called a thrill or pulse. This care sheet gives you a general idea about how long it will take for you to recover. But each person recovers at a different pace. Follow the steps below to get better as quickly as possible. How can you care for yourself at home? Activity  ? · Rest when you feel tired. Getting enough sleep will help you recover. Do not lie on or sleep on the arm with the access. ? · Avoid activities such as washing windows or gardening that put stress on the arm with the access.    ? · You may use your arm, but do not lift anything that weighs more than about 15 pounds. This may include a child, heavy grocery bags, a heavy briefcase or backpack, cat litter or dog food bags, or a vacuum . ? · You can shower, but keep the access dry for the first 2 days. Cover the area with a plastic bag to keep it dry. ? · Do not soak or scrub the incision until it has healed. ? · Wear an arm guard to protect the area if you play sports or work with your arms. ? · You may drive when your doctor says it is okay. This is usually in 1 to 2 days. ? · Most people are able to return to work about 1 or 2 days after surgery. Diet  ? · Follow an eating plan that is good for your kidneys. A registered dietitian can help you make a meal plan that is right for you. You may need to limit protein, salt, fluids, and certain foods. Medicines  ? · Your doctor will tell you if and when you can restart your medicines. He or she will also give you instructions about taking any new medicines. ? · If you take blood thinners, such as warfarin (Coumadin), clopidogrel (Plavix), or aspirin, be sure to talk to your doctor. He or she will tell you if and when to start taking those medicines again. Make sure that you understand exactly what your doctor wants you to do. ? · Take pain medicines exactly as directed. ¨ If the doctor gave you a prescription medicine for pain, take it as prescribed. ¨ If you are not taking a prescription pain medicine, ask your doctor if you can take acetaminophen (Tylenol). Do not take ibuprofen (Advil, Motrin) or naproxen (Aleve), or similar medicines, unless your doctor tells you to. They may make chronic kidney disease worse. ¨ Do not take two or more pain medicines at the same time unless the doctor told you to. Many pain medicines have acetaminophen, which is Tylenol. Too much acetaminophen (Tylenol) can be harmful.    ? · If you think your pain medicine is making you sick to your stomach:  ¨ Take your medicine after meals (unless your doctor has told you not to). ¨ Ask your doctor for a different pain medicine. ? · If your doctor prescribed antibiotics, take them as directed. Do not stop taking them just because you feel better. You need to take the full course of antibiotics. Incision care  ? · Keep the area dry for 2 days. After 2 days, wash the area with soap and water every day, and always before dialysis. ? · Do not soak or scrub the incision until it has healed. ? · If you have a bandage, change it every day or as your doctor recommends. Your doctor will tell you when you can remove it. Exercise  ? · Squeeze a soft ball or other object as your doctor tells you. This will help blood flow through the access and help prevent blood clots. ? Elevation  ? · Prop up the sore arm on a pillow anytime you sit or lie down during the next 3 days. Try to keep it above the level of your heart. This will help reduce swelling. Other instructions  ? · Every day, check your access for a pulse or thrill in the fistula or graft area. A thrill is a vibration. To feel a pulse or thrill, place the first two fingers of your hand over the access. ? · Do not bump your arm. ? · Do not wear tight clothing, jewelry, or anything else that may squeeze the access. ? · Use your other arm to have blood drawn or blood pressure taken. ? · Do not put cream or lotion on or near the access. ? · Make sure all doctors you deal with know you have a vascular access. Follow-up care is a key part of your treatment and safety. Be sure to make and go to all appointments, and call your doctor if you are having problems. It's also a good idea to know your test results and keep a list of the medicines you take. When should you call for help? Call 911 anytime you think you may need emergency care. For example, call if:  ? · You passed out (lost consciousness). ? · You have chest pain, are short of breath, or cough up blood.    ?Call your doctor now or seek immediate medical care if:  ? · Your hand or arm is cold or dark-colored. ? · You have no pulse in your access. ? · You have nausea or you vomit. ? · You have pain that does not get better after you take pain medicine. ? · You have loose stitches, or your incision comes open. ? · You are bleeding from the incision. ? · You have signs of infection, such as:  ¨ Increased pain, swelling, warmth, or redness. ¨ Red streaks leading from the area. ¨ Pus draining from the area. ¨ A fever. ? · You have signs of a blood clot in your leg (called a deep vein thrombosis), such as:  ¨ Pain in your calf, back of the knee, thigh, or groin. ¨ Redness or swelling in your leg. ? Watch closely for changes in your health, and be sure to contact your doctor if you have any problems. Where can you learn more? Go to http://anabel-mor.info/. Enter P616 in the search box to learn more about \"Hemodialysis Access: What to Expect at Home. \"  Current as of: May 12, 2017  Content Version: 11.4  © 8640-1690 [employer]. Care instructions adapted under license by [employer] (which disclaims liability or warranty for this information). If you have questions about a medical condition or this instruction, always ask your healthcare professional. [address] any warranty or liability for your use of this information.

## 2017-11-25 NOTE — ED NOTES
Pt arrives ambulatory to room c/o right arm pain x 2 days. Pt had fistula and  Graft put in 11 days PTA. Pt denies fever, redness or swelling at the site. Pt placed on stretcher with call bell in reach.

## 2017-11-25 NOTE — ED PROVIDER NOTES
EMERGENCY DEPARTMENT HISTORY AND PHYSICAL EXAM    Date: 11/25/2017  Patient Name: Zarina Carty    History of Presenting Illness     Chief Complaint   Patient presents with    Post OP Complication     States he had dialysis fistula placed on 11/14 at [address] [place] [address] he has had pain x yesterday         History Provided By: Patient    History of present illness:   Zarina Carty is a 38 y.o. male with PMHx significant for HTN, CAD, DVT, and kidney transplant ([address], 2001) who presents ambulatory to the ED for evaluation of right arm fistula which was placed in 11/14/2017 by Dr. Escobar Simmons, Vascular surgery. Pt describes 6/10 pain at the site of surgery (along the staples) x 1 day, however denies fevers. Of note, pt has a follow up appointment to check his scar/medications on 11/29/2017. He denies any headaches, fevers, chest pain, abdominal pain, cough, and common colds. Social Hx:  ETOH: no  Tobacco: no  Illicit drug use: no      PCP: Twyla Boland MD    Current Outpatient Prescriptions   Medication Sig Dispense Refill    oxyCODONE-acetaminophen (PERCOCET) 5-325 mg per tablet Take 1-2 Tabs by mouth every four (4) hours as needed for Pain. Max Daily Amount: 12 Tabs. 30 Tab 0    HYDROcodone-acetaminophen (NORCO) 5-325 mg per tablet Take 1 Tab by mouth every four (4) hours as needed for Pain. Max Daily Amount: 6 Tabs. 20 Tab 0    ondansetron (ZOFRAN ODT) 4 mg disintegrating tablet Take 1 Tab by mouth every eight (8) hours as needed for Nausea. 10 Tab 0    HYDROcodone-acetaminophen (NORCO) 5-325 mg per tablet Take 1 Tab by mouth every four (4) hours as needed for Pain. Max Daily Amount: 6 Tabs. 20 Tab 0    amLODIPine (NORVASC) 2.5 mg tablet Take 2.5 mg by mouth daily.  omeprazole (PRILOSEC) 20 mg capsule Take 20 mg by mouth daily.  calcitRIOL (ROCALTROL) 0.5 mcg capsule Take 0.5 mcg by mouth daily.  levETIRAcetam (KEPPRA) 750 mg tablet Take 750 mg by mouth two (2) times a day.       lacosamide (VIMPAT) 50 mg tab tablet Take 50 mg by mouth two (2) times a day.  atorvastatin (LIPITOR) 20 mg tablet Take 20 mg by mouth daily.  warfarin (COUMADIN) 5 mg tablet Decrease coumadin dose to 2.5mg while taking these antibiotics. Recheck INR every 2 days until you finish the antibiotics. (Patient taking differently: Take 3.5 mg by mouth six (6) days a week. Monday thru Saturday  Indications: 5 mg m-T-W-Th, S-S Friday 2.5mg) 30 Tab 0    clopidogrel (PLAVIX) 75 mg tablet Take  by mouth daily.  sevelamer carbonate (RENVELA) 800 mg Tab tab Take 2,400 mg by mouth three (3) times daily (with meals).  aspirin 81 mg chewable tablet Take 81 mg by mouth daily. Past History     Past Medical History:  Past Medical History:   Diagnosis Date    Abdominal hematoma     AICD (automatic cardioverter/defibrillator) present     CAD (coronary artery disease)     anterior MI s/p 2 stents  2007    Chronic abdominal pain     Chronic kidney disease     ESRD; Dialysis dependent.  Home [facility] does labs    DVT (deep venous thrombosis) ([address] 2001    DVT of popliteal vein ([address] 11/2011    not anticoagulated due to bleeding, IVC filter    Gallstone pancreatitis     Gastrointestinal disorder     acid reflux    Gastrointestinal disorder     peptic ulcer    Hemodialysis patient ([address]     High cholesterol     Hypertension     Kidney transplant     b/l kidneys 1995, 2001    Nephrotic syndrome     Other ill-defined conditions(799.89)     kidny transplant x2,  on dialysis    Other ill-defined conditions(799.89)     home dialysis    Other ill-defined conditions(799.89)     hx recurrent left leg DVT    Peritonitis ([address]     Seizures ([address] 2015    most recent- only had three in lifetime    Small bowel obstruction     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    [facility]ach [facility])        Past Surgical History:  Past Surgical History:   Procedure Laterality Date    CARDIAC SURG PROCEDURE UNLIST  2007    2 stents    HX APPENDECTOMY      HX CHOLECYSTECTOMY      HX OTHER SURGICAL      cholecystectomy    HX OTHER SURGICAL  11/2011    exploratory laparotomy    HX OTHER SURGICAL      fem-pop    HX OTHER SURGICAL  02/2013    right upper extremity fistula    HX PACEMAKER  6/2009    AICD    HX SMALL BOWEL RESECTION      HX TRANSPLANT  2001     Kidney    HX TRANSPLANT  1992    kidney    HX UROLOGICAL      2 kidney transplants    IN EDG US EXAM SURGICAL ALTER STOM DUODENUM/JEJUNUM  7/15/2011         IN ESOPHAGOGASTRODUODENOSCOPY TRANSORAL DIAGNOSTIC  5/24/2012            Family History:  Family History   Problem Relation Age of Onset    COPD Mother     Lung Disease Mother     Cancer Father      stomach    Cancer Maternal Grandmother        Social History:  Social History   Substance Use Topics    Smoking status: Never Smoker    Smokeless tobacco: Never Used    Alcohol use No       Allergies: Allergies   Allergen Reactions    Shellfish Containing Products Swelling     Break out in rash, trouble breathing    Heparin Analogues Other (comments)     Positive history of HIT    Iodinated Contrast- Oral And Iv Dye Other (comments)     Because of renal disease. No rash or hives per patient report 1/14/2012         Review of Systems   Review of Systems   Constitutional: Negative for chills, diaphoresis, fever and unexpected weight change. HENT: Negative for rhinorrhea and sore throat. Eyes: Negative for pain. Respiratory: Negative for cough, shortness of breath, wheezing and stridor. Cardiovascular: Negative for chest pain and leg swelling. Gastrointestinal: Negative for abdominal pain, blood in stool, diarrhea, nausea and vomiting. Genitourinary: Negative for difficulty urinating, dysuria and flank pain. Musculoskeletal: Negative for back pain and neck stiffness. Skin: Positive for wound. Negative for rash. +Pain at site of surgical scar; R arm. Neurological: Negative for seizures, syncope, weakness, light-headedness and headaches. Psychiatric/Behavioral: Negative for confusion. All other systems reviewed and are negative. Physical Exam     Vitals:    11/25/17 1545 11/25/17 1649   BP: 125/78 116/74   Resp: 16 16   Temp: 98.9 °F (37.2 °C)    SpO2: 100% 99%   Weight: 65 kg (143 lb 4.8 oz)    Height: 5' 7\" (1.702 m)      Physical Exam   Constitutional: He is oriented to person, place, and time. He appears well-developed and well-nourished. No distress. 37 yo -American male   HENT:   Head: Normocephalic and atraumatic. Eyes: Conjunctivae are normal. Right eye exhibits no discharge. Left eye exhibits no discharge. Neck: Normal range of motion. Neck supple. Cardiovascular: Normal rate, regular rhythm and normal heart sounds. No murmur heard. Pulmonary/Chest: Effort normal and breath sounds normal. No respiratory distress. He has no wheezes. Neurological: He is alert and oriented to person, place, and time. Skin: Skin is warm and dry. He is not diaphoretic. Well healing surgical incisions to the R arm - sutures to the medial arm just proximal to the elbow and staples to the axilla. No drainage or surrounding erythema. Non-tender to palpation. + thrill from fistula. Psychiatric: He has a normal mood and affect. His behavior is normal.   Nursing note and vitals reviewed. Medical Decision Making   I am the first provider for this patient. I reviewed the vital signs, available nursing notes, past medical history, past surgical history, family history and social history. ED Course:    MEDICATIONS GIVEN:  Medications   oxyCODONE-acetaminophen (PERCOCET) 5-325 mg per tablet 1 Tab (1 Tab Oral Given 11/25/17 1808)       Disposition:  DISCHARGE NOTE  6:24 PM  The patient has been re-evaluated and is ready for discharge. Reviewed available results with patient. Counseled pt on diagnosis and care plan.  Pt has expressed understanding, and all questions have been answered. Pt agrees with plan and agrees to F/U as recommended, or return to the ED if their sxs worsen. Discharge instructions have been provided and explained to the pt, along with reasons to return to the ED. Provider Notes (Medical Decision Making):   5:37 PM   reviewed. Pt is listed as suspected pharmacy/prescriber . Most recent narcotic Rx was filled on 11/2/17 for Percocet 5 mg (#120). DDx: post-surgical complication, chronic pain, narcotic dependence. Procedures:  Procedures     Plan:  Discharge Medication List as of 11/25/2017  6:02 PM      CONTINUE these medications which have NOT CHANGED    Details   oxyCODONE-acetaminophen (PERCOCET) 5-325 mg per tablet Take 1-2 Tabs by mouth every four (4) hours as needed for Pain. Max Daily Amount: 12 Tabs., Print, Disp-30 Tab, R-0      !! HYDROcodone-acetaminophen (NORCO) 5-325 mg per tablet Take 1 Tab by mouth every four (4) hours as needed for Pain. Max Daily Amount: 6 Tabs., Print, Disp-20 Tab, R-0      ondansetron (ZOFRAN ODT) 4 mg disintegrating tablet Take 1 Tab by mouth every eight (8) hours as needed for Nausea., Normal, Disp-10 Tab, R-0      !! HYDROcodone-acetaminophen (NORCO) 5-325 mg per tablet Take 1 Tab by mouth every four (4) hours as needed for Pain. Max Daily Amount: 6 Tabs., Print, Disp-20 Tab, R-0      amLODIPine (NORVASC) 2.5 mg tablet Take 2.5 mg by mouth daily. , Historical Med      omeprazole (PRILOSEC) 20 mg capsule Take 20 mg by mouth daily. , Historical Med      calcitRIOL (ROCALTROL) 0.5 mcg capsule Take 0.5 mcg by mouth daily. , Historical Med      levETIRAcetam (KEPPRA) 750 mg tablet Take 750 mg by mouth two (2) times a day., Historical Med      lacosamide (VIMPAT) 50 mg tab tablet Take 50 mg by mouth two (2) times a day., Historical Med      atorvastatin (LIPITOR) 20 mg tablet Take 20 mg by mouth daily. , Historical Med      warfarin (COUMADIN) 5 mg tablet Decrease coumadin dose to 2.5mg while taking these antibiotics. Recheck INR every 2 days until you finish the antibiotics. , Print, Disp-30 Tab, R-0      clopidogrel (PLAVIX) 75 mg tablet Take  by mouth daily. , Historical Med      sevelamer carbonate (RENVELA) 800 mg Tab tab Take 2,400 mg by mouth three (3) times daily (with meals). , Historical Med      aspirin 81 mg chewable tablet Take 81 mg by mouth daily. , Historical Med       !! - Potential duplicate medications found. Please discuss with provider. Follow-up Information     Follow up With Details Comments Contact Info    Lennox Hoist, MD  as scheduled on Wednesday, November 29th [address] [facility] [address]  [address] [facility]      Cydney Chacon MD  as needed for continued pain management          Return to the closest emergency room for deterioration. Diagnosis     Clinical Impression:   1. Pain of right upper arm    2. Presence of arterial-venous shunt (for dialysis) ([address]      _______________________________  Attestations: This note is prepared by Corin Stanley, acting as Scribe for [employer] .      The scribe's documentation has been prepared under my direction and personally reviewed by me in its entirety. I confirm that the note above accurately reflects all work, treatment, procedures, and medical decision making performed by me.   THERON Obando   _______________________________

## 2017-11-29 ENCOUNTER — TELEPHONE (OUTPATIENT)
Dept: SURGERY | Age: 42
End: 2017-11-29

## 2017-11-29 ENCOUNTER — OFFICE VISIT (OUTPATIENT)
Dept: SURGERY | Age: 42
End: 2017-11-29

## 2017-11-29 VITALS
DIASTOLIC BLOOD PRESSURE: 65 MMHG | BODY MASS INDEX: 22.07 KG/M2 | HEIGHT: 67 IN | SYSTOLIC BLOOD PRESSURE: 103 MMHG | HEART RATE: 86 BPM | RESPIRATION RATE: 16 BRPM | WEIGHT: 140.6 LBS | OXYGEN SATURATION: 100 %

## 2017-11-29 DIAGNOSIS — Z09 POSTOPERATIVE EXAMINATION: Primary | ICD-10-CM

## 2017-11-29 NOTE — MR AVS SNAPSHOT
Visit Information Date & Time Provider Department Dept. Phone Encounter #  
 11/29/2017  9:00 AM Bruce Corbin MD [facility]  [address] [phone] [id number] Upcoming Health Maintenance Date Due DTaP/Tdap/Td series (1 - Tdap) 9/28/1998 Pneumococcal 19-64 Highest Risk (2 of 3 - PCV13) 3/1/2013 Influenza Age 5 to Adult 8/1/2017 Allergies as of 11/29/2017  Review Complete On: 11/29/2017 By: Bruce Corbin MD  
  
 Severity Noted Reaction Type Reactions Shellfish Containing Products High 10/07/2011    Swelling Break out in rash, trouble breathing Heparin Analogues  11/23/2011    Other (comments) Positive history of HIT Iodinated Contrast- Oral And Iv Dye  10/29/2011   Intolerance Other (comments) Because of renal disease. No rash or hives per patient report 1/14/2012 Current Immunizations  Reviewed on 5/26/2015 Name Date Influenza Vaccine (Madin Beaver Canine Kidney) PF 9/16/2014 12:20 PM  
 Influenza Vaccine Whole 10/1/2011 Pneumococcal Vaccine (Unspecified Type) 3/1/2012 ZZZ-RETIRED (DO NOT USE) Pneumococcal Vaccine (Unspecified Type)  Deferred (Patient Refused) Not reviewed this visit You Were Diagnosed With   
  
 Codes Comments Postoperative examination    -  Primary ICD-10-CM: W60 ICD-9-CM: V67.00 Vitals BP Pulse Resp Height(growth percentile) Weight(growth percentile) SpO2  
 103/65 (BP 1 Location: Left arm, BP Patient Position: Sitting) 86 16 5' 7\" (1.702 m) 140 lb 9.6 oz (63.8 kg) 100% BMI Smoking Status 22.02 kg/m2 Never Smoker BMI and BSA Data Body Mass Index Body Surface Area 22.02 kg/m 2 1.74 m 2 Preferred Pharmacy Pharmacy Name Phone [facility] [employer] [facility]  [place] [phone] Your Updated Medication List  
  
   
 This list is accurate as of: 11/29/17 11:32 AM.  Always use your most recent med list. amLODIPine 2.5 mg tablet Commonly known as:  [place] Enrique Take 2.5 mg by mouth daily. aspirin 81 mg chewable tablet Take 81 mg by mouth daily. calcitRIOL 0.5 mcg capsule Commonly known as:  ROCALTROL Take 0.5 mcg by mouth daily. * HYDROcodone-acetaminophen 5-325 mg per tablet Commonly known as:  Mon[place] Kramerall Take 1 Tab by mouth every four (4) hours as needed for Pain. Max Daily Amount: 6 Tabs. * HYDROcodone-acetaminophen 5-325 mg per tablet Commonly known as:  Monroe Kramerall Take 1 Tab by mouth every four (4) hours as needed for Pain. Max Daily Amount: 6 Tabs. KEPPRA 750 mg tablet Generic drug:  levETIRAcetam  
Take 750 mg by mouth two (2) times a day. LIPITOR 20 mg tablet Generic drug:  atorvastatin Take 20 mg by mouth daily. omeprazole 20 mg capsule Commonly known as:  PRILOSEC Take 20 mg by mouth daily. ondansetron 4 mg disintegrating tablet Commonly known as:  ZOFRAN ODT Take 1 Tab by mouth every eight (8) hours as needed for Nausea. oxyCODONE-acetaminophen 5-325 mg per tablet Commonly known as:  PERCOCET Take 1-2 Tabs by mouth every four (4) hours as needed for Pain. Max Daily Amount: 12 Tabs. PLAVIX 75 mg Tab Generic drug:  clopidogrel Take  by mouth daily. RENVELA 800 mg Tab tab Generic drug:  sevelamer carbonate Take 2,400 mg by mouth three (3) times daily (with meals). VIMPAT 50 mg Tab tablet Generic drug:  lacosamide Take 50 mg by mouth two (2) times a day. warfarin 5 mg tablet Commonly known as:  COUMADIN Decrease coumadin dose to 2.5mg while taking these antibiotics. Recheck INR every 2 days until you finish the antibiotics. * Notice: This list has 2 medication(s) that are the same as other medications prescribed for you.  Read the directions carefully, and ask your doctor or other care provider to review them with you. Introducing \[facility] [place] [facility]\"" & HEALTH SERVICES! Dear Xuan January: Thank you for requesting a [employer] account. Our records indicate that you already have an active [employer] account. You can access your account anytime at [email or web address] Did you know that you can access your hospital and ER discharge instructions at any time in [employer]? You can also review all of your test results from your hospital stay or ER visit. Additional Information If you have questions, please visit the Frequently Asked Questions section of the [employer] website at [email or web address] Remember, [employer] is NOT to be used for urgent needs. For medical emergencies, dial 911. Now available from your iPhone and Android! Please provide this summary of care documentation to your next provider. Your primary care clinician is listed as Virgil Moser. If you have any questions after today's visit, please call [phone].

## 2017-11-29 NOTE — PROGRESS NOTES
The patient is status post insertion of bovine AV graft right upper arm and ligation of AV fistula right upper arm. He has no complaints. On examination there is a good thrill in the AV graft. The incision is doing well and all sutures removed. There is a palpable radial pulse distally. The patient can use the new AV graft beginning next week on 12/4/17. He can follow up prn. Final Diagnosis:  Status post insertion AV graft and ligation of AV fistula right arm. MedDATA/yue     cc:  Aissatou De Souza MD

## 2017-11-29 NOTE — TELEPHONE ENCOUNTER
Spoke with dialysis nurse. Per Dr Everardo Tejada, Mr Porfirio Davis can use AV graft right arm starting Monday, 12/4/17. Nurse will have pt at dialysis to observe first use of graft. Dr Everardo Tejada also advised this is a bovine graft.

## 2018-01-08 ENCOUNTER — OFFICE VISIT (OUTPATIENT)
Dept: SURGERY | Age: 43
End: 2018-01-08

## 2018-01-08 ENCOUNTER — TELEPHONE (OUTPATIENT)
Dept: SURGERY | Age: 43
End: 2018-01-08

## 2018-01-08 VITALS
DIASTOLIC BLOOD PRESSURE: 103 MMHG | WEIGHT: 146.8 LBS | HEART RATE: 95 BPM | RESPIRATION RATE: 18 BRPM | BODY MASS INDEX: 23.04 KG/M2 | TEMPERATURE: 97.5 F | OXYGEN SATURATION: 100 % | HEIGHT: 67 IN | SYSTOLIC BLOOD PRESSURE: 153 MMHG

## 2018-01-08 DIAGNOSIS — T82.868A THROMBOSIS OF ARTERIOVENOUS GRAFT, INITIAL ENCOUNTER (HCC): Primary | ICD-10-CM

## 2018-01-08 NOTE — PROGRESS NOTES
The patient is status post insertion of bovine AV graft right upper arm in November 2017. He does home hemodialysis. He reports that he attempted dialysis and got clots without flow. On examination there is pulsation near the arterial end of the graft, but no pulsation above that level and no bruit. Duplex ultrasound shows that the graft is thrombosed. There is a patent stub with to and fro flow at the lower end where there is a residual 2 cm segment of AV fistula. The bovine AV graft is anastomosed to the AV fistula at that level. The patient will be sent to interventional radiology for thrombolysis and fistulogram and angioplasty. As long as this restores flow, he can follow up prn. Final Diagnosis: Thrombosis of AV graft. MedDENA/yue     cc:  Manuel Melara MD

## 2018-01-08 NOTE — TELEPHONE ENCOUNTER
Mr Jessica Ba is s/p insertion of AV graft (bovine) right arm 11/14/17 & ligation of AV fistula right arm 11/14/17. Pt states he has clots while dialyzing. Appt made for pt to see Dr Kapil Baumann today.

## 2018-01-08 NOTE — MR AVS SNAPSHOT
Visit Information Date & Time Provider Department Dept. Phone Encounter #  
 1/8/2018 11:00 AM Patricia Melgoza MD [facility]  [address] [phone] [id number] Upcoming Health Maintenance Date Due DTaP/Tdap/Td series (1 - Tdap) 9/28/1998 Pneumococcal 19-64 Highest Risk (2 of 3 - PCV13) 3/1/2013 Influenza Age 5 to Adult 8/1/2017 Allergies as of 1/8/2018  Review Complete On: 1/8/2018 By: Patricia Melgoza MD  
  
 Severity Noted Reaction Type Reactions Shellfish Containing Products High 10/07/2011    Swelling Break out in rash, trouble breathing Heparin Analogues  11/23/2011    Other (comments) Positive history of HIT Iodinated Contrast- Oral And Iv Dye  10/29/2011   Intolerance Other (comments) Because of renal disease. No rash or hives per patient report 1/14/2012 Current Immunizations  Reviewed on 5/26/2015 Name Date Influenza Vaccine (Madin Genoveva Canine Kidney) PF 9/16/2014 12:20 PM  
 Influenza Vaccine Whole 10/1/2011 Pneumococcal Vaccine (Unspecified Type) 3/1/2012 ZZZ-RETIRED (DO NOT USE) Pneumococcal Vaccine (Unspecified Type)  Deferred (Patient Refused) Not reviewed this visit You Were Diagnosed With   
  
 Codes Comments Thrombosis of arteriovenous graft, initial encounter [facility])    -  Primary ICD-10-CM: W64.388C ICD-9-CM: 996.73 Vitals BP Pulse Temp Resp Height(growth percentile) Weight(growth percentile) (!) 153/103 (BP 1 Location: Left arm, BP Patient Position: Sitting) 95 97.5 °F (36.4 °C) (Oral) 18 5' 7\" (1.702 m) 146 lb 12.8 oz (66.6 kg) SpO2 BMI Smoking Status 100% 22.99 kg/m2 Never Smoker BMI and BSA Data Body Mass Index Body Surface Area  
 22.99 kg/m 2 1.77 m 2 Preferred Pharmacy Pharmacy Name Phone [facility] [address]  [place] [phone] Your Updated Medication List  
  
   
This list is accurate as of: 1/8/18  1:59 PM.  Always use your most recent med list. amLODIPine 2.5 mg tablet Commonly known as:  Marta Jurado Take 2.5 mg by mouth daily. aspirin 81 mg chewable tablet Take 81 mg by mouth daily. calcitRIOL 0.5 mcg capsule Commonly known as:  ROCALTROL Take 0.5 mcg by mouth daily. * HYDROcodone-acetaminophen 5-325 mg per tablet Commonly known as:  [place] Daring Take 1 Tab by mouth every four (4) hours as needed for Pain. Max Daily Amount: 6 Tabs. * HYDROcodone-acetaminophen 5-325 mg per tablet Commonly known as:  Jerod Daring Take 1 Tab by mouth every four (4) hours as needed for Pain. Max Daily Amount: 6 Tabs. KEPPRA 750 mg tablet Generic drug:  levETIRAcetam  
Take 750 mg by mouth two (2) times a day. LIPITOR 20 mg tablet Generic drug:  atorvastatin Take 20 mg by mouth daily. omeprazole 20 mg capsule Commonly known as:  PRILOSEC Take 20 mg by mouth daily. ondansetron 4 mg disintegrating tablet Commonly known as:  ZOFRAN ODT Take 1 Tab by mouth every eight (8) hours as needed for Nausea. oxyCODONE-acetaminophen 5-325 mg per tablet Commonly known as:  PERCOCET Take 1-2 Tabs by mouth every four (4) hours as needed for Pain. Max Daily Amount: 12 Tabs. PLAVIX 75 mg Tab Generic drug:  clopidogrel Take  by mouth daily. RENVELA 800 mg Tab tab Generic drug:  sevelamer carbonate Take 2,400 mg by mouth three (3) times daily (with meals). VIMPAT 50 mg Tab tablet Generic drug:  lacosamide Take 50 mg by mouth two (2) times a day. warfarin 5 mg tablet Commonly known as:  COUMADIN Decrease coumadin dose to 2.5mg while taking these antibiotics. Recheck INR every 2 days until you finish the antibiotics. * Notice:   This list has 2 medication(s) that are the same as other medications prescribed for you. Read the directions carefully, and ask your doctor or other care provider to review them with you. Introducing [facility] & HEALTH SERVICES! Dear Nelson Herron: Thank you for requesting a [employer] account. Our records indicate that you already have an active [employer] account. You can access your account anytime at [email or web address] Did you know that you can access your hospital and ER discharge instructions at any time in [employer]? You can also review all of your test results from your hospital stay or ER visit. Additional Information If you have questions, please visit the Frequently Asked Questions section of the [employer] website at [email or web address] Remember, [employer] is NOT to be used for urgent needs. For medical emergencies, dial 911. Now available from your iPhone and Android! Please provide this summary of care documentation to your next provider. Your primary care clinician is listed as Jennifer Moser. If you have any questions after today's visit, please call [phone].

## 2018-02-19 ENCOUNTER — TELEPHONE (OUTPATIENT)
Dept: SURGERY | Age: 43
End: 2018-02-19

## 2018-02-19 NOTE — TELEPHONE ENCOUNTER
Russ Morrowe unable to make appt with [facility]. Per Dr Zach Stokes, called [facility] to make appt at [address] [place] [address].   Pt should arrive before 1:00 pm.

## 2018-11-16 ENCOUNTER — HOSPITAL ENCOUNTER (INPATIENT)
Age: 43
LOS: 6 days | Discharge: HOME | DRG: 314 | End: 2018-11-23
Attending: EMERGENCY MEDICINE | Admitting: INTERNAL MEDICINE
Payer: MEDICARE

## 2018-11-16 ENCOUNTER — APPOINTMENT (OUTPATIENT)
Dept: GENERAL RADIOLOGY | Age: 43
DRG: 314 | End: 2018-11-16
Attending: EMERGENCY MEDICINE
Payer: MEDICARE

## 2018-11-16 DIAGNOSIS — A41.9 SEPSIS, DUE TO UNSPECIFIED ORGANISM: Primary | ICD-10-CM

## 2018-11-16 DIAGNOSIS — I95.9 HYPOTENSION, UNSPECIFIED HYPOTENSION TYPE: ICD-10-CM

## 2018-11-16 DIAGNOSIS — E87.6 ACUTE HYPOKALEMIA: ICD-10-CM

## 2018-11-16 DIAGNOSIS — Z99.2 ESRD (END STAGE RENAL DISEASE) ON DIALYSIS (HCC): ICD-10-CM

## 2018-11-16 DIAGNOSIS — R79.1 SUPRATHERAPEUTIC INR: ICD-10-CM

## 2018-11-16 DIAGNOSIS — N18.6 ESRD (END STAGE RENAL DISEASE) ON DIALYSIS (HCC): ICD-10-CM

## 2018-11-16 LAB
ALBUMIN SERPL-MCNC: 3.6 G/DL (ref 3.5–5)
ALBUMIN/GLOB SERPL: 0.9 {RATIO} (ref 1.1–2.2)
ALP SERPL-CCNC: 105 U/L (ref 45–117)
ALT SERPL-CCNC: 23 U/L (ref 12–78)
ANION GAP SERPL CALC-SCNC: 9 MMOL/L (ref 5–15)
APTT PPP: 56.9 SEC (ref 22.1–32)
AST SERPL-CCNC: 53 U/L (ref 15–37)
BASOPHILS # BLD: 0 K/UL (ref 0–0.1)
BASOPHILS NFR BLD: 0 % (ref 0–1)
BILIRUB SERPL-MCNC: 0.5 MG/DL (ref 0.2–1)
BUN SERPL-MCNC: 24 MG/DL (ref 6–20)
BUN/CREAT SERPL: 3 (ref 12–20)
CALCIUM SERPL-MCNC: 8.4 MG/DL (ref 8.5–10.1)
CHLORIDE SERPL-SCNC: 96 MMOL/L (ref 97–108)
CO2 SERPL-SCNC: 27 MMOL/L (ref 21–32)
CREAT SERPL-MCNC: 7.45 MG/DL (ref 0.7–1.3)
DIFFERENTIAL METHOD BLD: ABNORMAL
EOSINOPHIL # BLD: 0.7 K/UL (ref 0–0.4)
EOSINOPHIL NFR BLD: 7 % (ref 0–7)
ERYTHROCYTE [DISTWIDTH] IN BLOOD BY AUTOMATED COUNT: 13.5 % (ref 11.5–14.5)
GLOBULIN SER CALC-MCNC: 4.1 G/DL (ref 2–4)
GLUCOSE SERPL-MCNC: 111 MG/DL (ref 65–100)
HCT VFR BLD AUTO: 24.1 % (ref 36.6–50.3)
HGB BLD-MCNC: 8.2 G/DL (ref 12.1–17)
IMM GRANULOCYTES # BLD: 0 K/UL (ref 0–0.04)
IMM GRANULOCYTES NFR BLD AUTO: 0 % (ref 0–0.5)
INR PPP: 4.8 (ref 0.9–1.1)
LACTATE SERPL-SCNC: 1.2 MMOL/L (ref 0.4–2)
LYMPHOCYTES # BLD: 0.6 K/UL (ref 0.8–3.5)
LYMPHOCYTES NFR BLD: 6 % (ref 12–49)
MAGNESIUM SERPL-MCNC: 1.8 MG/DL (ref 1.6–2.4)
MCH RBC QN AUTO: 31.9 PG (ref 26–34)
MCHC RBC AUTO-ENTMCNC: 34 G/DL (ref 30–36.5)
MCV RBC AUTO: 93.8 FL (ref 80–99)
MONOCYTES # BLD: 0.9 K/UL (ref 0–1)
MONOCYTES NFR BLD: 10 % (ref 5–13)
NEUTS SEG # BLD: 7.2 K/UL (ref 1.8–8)
NEUTS SEG NFR BLD: 77 % (ref 32–75)
NRBC # BLD: 0 K/UL (ref 0–0.01)
NRBC BLD-RTO: 0 PER 100 WBC
PLATELET # BLD AUTO: 126 K/UL (ref 150–400)
PMV BLD AUTO: 11.4 FL (ref 8.9–12.9)
POTASSIUM SERPL-SCNC: 2.7 MMOL/L (ref 3.5–5.1)
PROT SERPL-MCNC: 7.7 G/DL (ref 6.4–8.2)
PROTHROMBIN TIME: 45.3 SEC (ref 9–11.1)
RBC # BLD AUTO: 2.57 M/UL (ref 4.1–5.7)
RBC MORPH BLD: ABNORMAL
SODIUM SERPL-SCNC: 132 MMOL/L (ref 136–145)
THERAPEUTIC RANGE,PTTT: ABNORMAL SECS (ref 58–77)
WBC # BLD AUTO: 9.4 K/UL (ref 4.1–11.1)

## 2018-11-16 PROCEDURE — 74011000258 HC RX REV CODE- 258: Performed by: EMERGENCY MEDICINE

## 2018-11-16 PROCEDURE — 74011250636 HC RX REV CODE- 250/636: Performed by: EMERGENCY MEDICINE

## 2018-11-16 PROCEDURE — 96367 TX/PROPH/DG ADDL SEQ IV INF: CPT

## 2018-11-16 PROCEDURE — 87040 BLOOD CULTURE FOR BACTERIA: CPT

## 2018-11-16 PROCEDURE — 36415 COLL VENOUS BLD VENIPUNCTURE: CPT

## 2018-11-16 PROCEDURE — 83605 ASSAY OF LACTIC ACID: CPT

## 2018-11-16 PROCEDURE — 83735 ASSAY OF MAGNESIUM: CPT

## 2018-11-16 PROCEDURE — 74011250637 HC RX REV CODE- 250/637: Performed by: EMERGENCY MEDICINE

## 2018-11-16 PROCEDURE — 99284 EMERGENCY DEPT VISIT MOD MDM: CPT

## 2018-11-16 PROCEDURE — 96366 THER/PROPH/DIAG IV INF ADDON: CPT

## 2018-11-16 PROCEDURE — 85730 THROMBOPLASTIN TIME PARTIAL: CPT

## 2018-11-16 PROCEDURE — 96375 TX/PRO/DX INJ NEW DRUG ADDON: CPT

## 2018-11-16 PROCEDURE — 85610 PROTHROMBIN TIME: CPT

## 2018-11-16 PROCEDURE — 85025 COMPLETE CBC W/AUTO DIFF WBC: CPT

## 2018-11-16 PROCEDURE — 80053 COMPREHEN METABOLIC PANEL: CPT

## 2018-11-16 PROCEDURE — 96365 THER/PROPH/DIAG IV INF INIT: CPT

## 2018-11-16 PROCEDURE — 71045 X-RAY EXAM CHEST 1 VIEW: CPT

## 2018-11-16 RX ORDER — POTASSIUM CHLORIDE 20 MEQ/1
40 TABLET, EXTENDED RELEASE ORAL
Status: COMPLETED | OUTPATIENT
Start: 2018-11-16 | End: 2018-11-16

## 2018-11-16 RX ORDER — SODIUM CHLORIDE 0.9 % (FLUSH) 0.9 %
5-10 SYRINGE (ML) INJECTION AS NEEDED
Status: DISCONTINUED | OUTPATIENT
Start: 2018-11-16 | End: 2018-11-17 | Stop reason: SDUPTHER

## 2018-11-16 RX ORDER — FENTANYL CITRATE 50 UG/ML
100 INJECTION, SOLUTION INTRAMUSCULAR; INTRAVENOUS
Status: COMPLETED | OUTPATIENT
Start: 2018-11-16 | End: 2018-11-16

## 2018-11-16 RX ORDER — VANCOMYCIN/0.9 % SOD CHLORIDE 1.5G/250ML
1500 PLASTIC BAG, INJECTION (ML) INTRAVENOUS ONCE
Status: COMPLETED | OUTPATIENT
Start: 2018-11-16 | End: 2018-11-17

## 2018-11-16 RX ORDER — ACETAMINOPHEN 500 MG
1000 TABLET ORAL
Status: DISCONTINUED | OUTPATIENT
Start: 2018-11-16 | End: 2018-11-16

## 2018-11-16 RX ADMIN — VANCOMYCIN HYDROCHLORIDE 1500 MG: 10 INJECTION, POWDER, LYOPHILIZED, FOR SOLUTION INTRAVENOUS at 22:51

## 2018-11-16 RX ADMIN — POTASSIUM CHLORIDE 40 MEQ: 20 TABLET, EXTENDED RELEASE ORAL at 22:02

## 2018-11-16 RX ADMIN — FENTANYL CITRATE 100 MCG: 50 INJECTION, SOLUTION INTRAMUSCULAR; INTRAVENOUS at 22:02

## 2018-11-16 RX ADMIN — PIPERACILLIN SODIUM,TAZOBACTAM SODIUM 3.38 G: 3; .375 INJECTION, POWDER, FOR SOLUTION INTRAVENOUS at 22:15

## 2018-11-17 ENCOUNTER — APPOINTMENT (OUTPATIENT)
Dept: CT IMAGING | Age: 43
DRG: 314 | End: 2018-11-17
Attending: INTERNAL MEDICINE
Payer: MEDICARE

## 2018-11-17 PROBLEM — A41.9 SEPSIS (HCC): Status: ACTIVE | Noted: 2018-11-17

## 2018-11-17 LAB
ANION GAP SERPL CALC-SCNC: 13 MMOL/L (ref 5–15)
BASOPHILS # BLD: 0.1 K/UL (ref 0–0.1)
BASOPHILS NFR BLD: 1 % (ref 0–1)
BUN SERPL-MCNC: 25 MG/DL (ref 6–20)
BUN/CREAT SERPL: 3 (ref 12–20)
CALCIUM SERPL-MCNC: 8 MG/DL (ref 8.5–10.1)
CHLORIDE SERPL-SCNC: 100 MMOL/L (ref 97–108)
CO2 SERPL-SCNC: 20 MMOL/L (ref 21–32)
CREAT SERPL-MCNC: 8.6 MG/DL (ref 0.7–1.3)
DIFFERENTIAL METHOD BLD: ABNORMAL
EOSINOPHIL # BLD: 0.6 K/UL (ref 0–0.4)
EOSINOPHIL NFR BLD: 6 % (ref 0–7)
ERYTHROCYTE [DISTWIDTH] IN BLOOD BY AUTOMATED COUNT: 13.6 % (ref 11.5–14.5)
GLUCOSE BLD STRIP.AUTO-MCNC: 88 MG/DL (ref 65–100)
GLUCOSE SERPL-MCNC: 77 MG/DL (ref 65–100)
HCT VFR BLD AUTO: 24.3 % (ref 36.6–50.3)
HGB BLD-MCNC: 8.1 G/DL (ref 12.1–17)
IMM GRANULOCYTES # BLD: 0.1 K/UL (ref 0–0.04)
IMM GRANULOCYTES NFR BLD AUTO: 1 % (ref 0–0.5)
INR PPP: 3.7 (ref 0.9–1.1)
LYMPHOCYTES # BLD: 1.1 K/UL (ref 0.8–3.5)
LYMPHOCYTES NFR BLD: 11 % (ref 12–49)
MCH RBC QN AUTO: 31.9 PG (ref 26–34)
MCHC RBC AUTO-ENTMCNC: 33.3 G/DL (ref 30–36.5)
MCV RBC AUTO: 95.7 FL (ref 80–99)
MONOCYTES # BLD: 1.7 K/UL (ref 0–1)
MONOCYTES NFR BLD: 16 % (ref 5–13)
NEUTS SEG # BLD: 7.1 K/UL (ref 1.8–8)
NEUTS SEG NFR BLD: 67 % (ref 32–75)
NRBC # BLD: 0 K/UL (ref 0–0.01)
NRBC BLD-RTO: 0 PER 100 WBC
PLATELET # BLD AUTO: 115 K/UL (ref 150–400)
PMV BLD AUTO: 11.1 FL (ref 8.9–12.9)
POTASSIUM SERPL-SCNC: 3.8 MMOL/L (ref 3.5–5.1)
PROTHROMBIN TIME: 35.1 SEC (ref 9–11.1)
RBC # BLD AUTO: 2.54 M/UL (ref 4.1–5.7)
SERVICE CMNT-IMP: NORMAL
SODIUM SERPL-SCNC: 133 MMOL/L (ref 136–145)
WBC # BLD AUTO: 10.7 K/UL (ref 4.1–11.1)

## 2018-11-17 PROCEDURE — 74011250636 HC RX REV CODE- 250/636: Performed by: INTERNAL MEDICINE

## 2018-11-17 PROCEDURE — 74011250636 HC RX REV CODE- 250/636: Performed by: EMERGENCY MEDICINE

## 2018-11-17 PROCEDURE — 65660000000 HC RM CCU STEPDOWN

## 2018-11-17 PROCEDURE — 94760 N-INVAS EAR/PLS OXIMETRY 1: CPT

## 2018-11-17 PROCEDURE — 74011000258 HC RX REV CODE- 258: Performed by: INTERNAL MEDICINE

## 2018-11-17 PROCEDURE — 74176 CT ABD & PELVIS W/O CONTRAST: CPT

## 2018-11-17 PROCEDURE — 85610 PROTHROMBIN TIME: CPT

## 2018-11-17 PROCEDURE — 74011250637 HC RX REV CODE- 250/637: Performed by: INTERNAL MEDICINE

## 2018-11-17 PROCEDURE — 80048 BASIC METABOLIC PNL TOTAL CA: CPT

## 2018-11-17 PROCEDURE — 36415 COLL VENOUS BLD VENIPUNCTURE: CPT

## 2018-11-17 PROCEDURE — 85025 COMPLETE CBC W/AUTO DIFF WBC: CPT

## 2018-11-17 PROCEDURE — 74011250637 HC RX REV CODE- 250/637: Performed by: EMERGENCY MEDICINE

## 2018-11-17 PROCEDURE — 82962 GLUCOSE BLOOD TEST: CPT

## 2018-11-17 RX ORDER — ATORVASTATIN CALCIUM 20 MG/1
20 TABLET, FILM COATED ORAL DAILY
Status: DISCONTINUED | OUTPATIENT
Start: 2018-11-18 | End: 2018-11-23 | Stop reason: HOSPADM

## 2018-11-17 RX ORDER — POTASSIUM CHLORIDE 7.45 MG/ML
10 INJECTION INTRAVENOUS ONCE
Status: COMPLETED | OUTPATIENT
Start: 2018-11-17 | End: 2018-11-17

## 2018-11-17 RX ORDER — SODIUM CHLORIDE 0.9 % (FLUSH) 0.9 %
5-10 SYRINGE (ML) INJECTION AS NEEDED
Status: DISCONTINUED | OUTPATIENT
Start: 2018-11-17 | End: 2018-11-23 | Stop reason: HOSPADM

## 2018-11-17 RX ORDER — SEVELAMER CARBONATE 800 MG/1
2400 TABLET, FILM COATED ORAL
Status: DISCONTINUED | OUTPATIENT
Start: 2018-11-17 | End: 2018-11-23 | Stop reason: HOSPADM

## 2018-11-17 RX ORDER — ACETAMINOPHEN 325 MG/1
650 TABLET ORAL
Status: DISCONTINUED | OUTPATIENT
Start: 2018-11-17 | End: 2018-11-23 | Stop reason: HOSPADM

## 2018-11-17 RX ORDER — SODIUM CHLORIDE 0.9 % (FLUSH) 0.9 %
5-10 SYRINGE (ML) INJECTION EVERY 8 HOURS
Status: DISCONTINUED | OUTPATIENT
Start: 2018-11-17 | End: 2018-11-23 | Stop reason: HOSPADM

## 2018-11-17 RX ORDER — LACOSAMIDE 50 MG/1
50 TABLET ORAL 2 TIMES DAILY
Status: DISCONTINUED | OUTPATIENT
Start: 2018-11-17 | End: 2018-11-23 | Stop reason: HOSPADM

## 2018-11-17 RX ORDER — POTASSIUM CHLORIDE 1.5 G/1.77G
20 POWDER, FOR SOLUTION ORAL
Status: COMPLETED | OUTPATIENT
Start: 2018-11-17 | End: 2018-11-17

## 2018-11-17 RX ORDER — NALOXONE HYDROCHLORIDE 0.4 MG/ML
0.1 INJECTION, SOLUTION INTRAMUSCULAR; INTRAVENOUS; SUBCUTANEOUS
Status: DISCONTINUED | OUTPATIENT
Start: 2018-11-17 | End: 2018-11-23 | Stop reason: HOSPADM

## 2018-11-17 RX ORDER — FACIAL-BODY WIPES
10 EACH TOPICAL ONCE
Status: DISPENSED | OUTPATIENT
Start: 2018-11-17 | End: 2018-11-18

## 2018-11-17 RX ORDER — OXYCODONE AND ACETAMINOPHEN 5; 325 MG/1; MG/1
1 TABLET ORAL
Status: DISCONTINUED | OUTPATIENT
Start: 2018-11-17 | End: 2018-11-20

## 2018-11-17 RX ORDER — ONDANSETRON 2 MG/ML
4 INJECTION INTRAMUSCULAR; INTRAVENOUS
Status: DISCONTINUED | OUTPATIENT
Start: 2018-11-17 | End: 2018-11-23 | Stop reason: HOSPADM

## 2018-11-17 RX ORDER — HYDROMORPHONE HYDROCHLORIDE 2 MG/ML
0.5 INJECTION, SOLUTION INTRAMUSCULAR; INTRAVENOUS; SUBCUTANEOUS
Status: DISCONTINUED | OUTPATIENT
Start: 2018-11-17 | End: 2018-11-20

## 2018-11-17 RX ORDER — PANTOPRAZOLE SODIUM 40 MG/1
40 TABLET, DELAYED RELEASE ORAL DAILY
Status: DISCONTINUED | OUTPATIENT
Start: 2018-11-18 | End: 2018-11-23 | Stop reason: HOSPADM

## 2018-11-17 RX ORDER — CALCITRIOL 0.25 UG/1
0.5 CAPSULE ORAL DAILY
Status: DISCONTINUED | OUTPATIENT
Start: 2018-11-18 | End: 2018-11-23 | Stop reason: HOSPADM

## 2018-11-17 RX ORDER — MAGNESIUM CITRATE
296 SOLUTION, ORAL ORAL
Status: DISPENSED | OUTPATIENT
Start: 2018-11-17 | End: 2018-11-18

## 2018-11-17 RX ADMIN — PIPERACILLIN SODIUM,TAZOBACTAM SODIUM 3.38 G: 3; .375 INJECTION, POWDER, FOR SOLUTION INTRAVENOUS at 08:05

## 2018-11-17 RX ADMIN — PIPERACILLIN SODIUM,TAZOBACTAM SODIUM 3.38 G: 3; .375 INJECTION, POWDER, FOR SOLUTION INTRAVENOUS at 16:10

## 2018-11-17 RX ADMIN — LACOSAMIDE 50 MG: 50 TABLET, FILM COATED ORAL at 17:01

## 2018-11-17 RX ADMIN — OXYCODONE AND ACETAMINOPHEN 1 TABLET: 5; 325 TABLET ORAL at 12:59

## 2018-11-17 RX ADMIN — POTASSIUM CHLORIDE 20 MEQ: 1.5 POWDER, FOR SOLUTION ORAL at 04:55

## 2018-11-17 RX ADMIN — Medication 10 ML: at 07:00

## 2018-11-17 RX ADMIN — SEVELAMER CARBONATE 2400 MG: 800 TABLET, FILM COATED ORAL at 17:01

## 2018-11-17 RX ADMIN — POTASSIUM CHLORIDE 10 MEQ: 10 INJECTION, SOLUTION INTRAVENOUS at 04:55

## 2018-11-17 RX ADMIN — OXYCODONE AND ACETAMINOPHEN 1 TABLET: 5; 325 TABLET ORAL at 04:54

## 2018-11-17 RX ADMIN — LEVETIRACETAM 750 MG: 500 TABLET ORAL at 17:01

## 2018-11-17 RX ADMIN — HYDROMORPHONE HYDROCHLORIDE 0.5 MG: 2 INJECTION INTRAMUSCULAR; INTRAVENOUS; SUBCUTANEOUS at 16:05

## 2018-11-17 RX ADMIN — Medication 10 ML: at 14:31

## 2018-11-17 RX ADMIN — ERYTHROPOIETIN 14000 UNITS: 10000 INJECTION, SOLUTION INTRAVENOUS; SUBCUTANEOUS at 16:08

## 2018-11-17 RX ADMIN — HYDROMORPHONE HYDROCHLORIDE 0.5 MG: 2 INJECTION INTRAMUSCULAR; INTRAVENOUS; SUBCUTANEOUS at 20:45

## 2018-11-17 RX ADMIN — Medication 10 ML: at 23:50

## 2018-11-17 NOTE — ED NOTES
TRANSFER - OUT REPORT: 
 
Verbal report given to Pillo Dickinson RN (name) on Antoinette Him  being transferred to renal (unit) for routine progression of care Report consisted of patients Situation, Background, Assessment and  
Recommendations(SBAR). Information from the following report(s) SBAR, Kardex, ED Summary, Intake/Output, MAR and Recent Results was reviewed with the receiving nurse. Lines:  
Peripheral IV 11/16/18 Left;Posterior Arm (Active) Site Assessment Clean, dry, & intact 11/16/2018  8:51 PM  
Phlebitis Assessment 0 11/16/2018  8:51 PM  
Infiltration Assessment 0 11/16/2018  8:51 PM  
Dressing Status Clean, dry, & intact 11/16/2018  8:51 PM  
Dressing Type Transparent 11/16/2018  8:51 PM  
Hub Color/Line Status Pink 11/16/2018  8:51 PM  
  
 
Opportunity for questions and clarification was provided. Patient transported with: 
 [employer]

## 2018-11-17 NOTE — CONSULTS
Vascular Surgery Consult    Subjective:      Judit Schroeder is a 41 y.o. male who presents with fevers. He has ESRD for many years and does home HD through a right femoral catheter. He has a composite right upper arm graft by Dr. Fior Adorno that he has not seen for awhile. He has been getting his access at the [facility] access center and getting weekly thrombectomies of his access. They could not keep it open for longer than one week so they placed a femoral catheter. The patient states there is no plans for other access and was not referred back to Dr. Fior Adorno or anyone else for that matter. Past Medical History:   Diagnosis Date    Abdominal hematoma     AICD (automatic cardioverter/defibrillator) present     CAD (coronary artery disease)     anterior MI s/p 2 stents  2007    Chronic abdominal pain     Chronic kidney disease     ESRD; Dialysis dependent.  [facility]    DVT (deep venous thrombosis) ([address] 2001    DVT of popliteal vein ([address] 11/2011    not anticoagulated due to bleeding, IVC filter    Gallstone pancreatitis     Gastrointestinal disorder     acid reflux    Gastrointestinal disorder     peptic ulcer    Hemodialysis patient ([address]     High cholesterol     Hypertension     Kidney transplant     b/l kidneys 1995, 2001    Nephrotic syndrome     Other ill-defined conditions(799.89)     kidny transplant x2,  on dialysis    Other ill-defined conditions(799.89)     home dialysis    Other ill-defined conditions(799.89)     hx recurrent left leg DVT    Peritonitis ([address]     Seizures ([address] 2015    most recent- only had three in lifetime    Small bowel obstruction (HCC)     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    V-tach [facility])      Past Surgical History:   Procedure Laterality Date    CARDIAC SURG PROCEDURE UNLIST  2007    2 stents    HX APPENDECTOMY      HX CHOLECYSTECTOMY      HX OTHER SURGICAL      cholecystectomy    HX OTHER SURGICAL  11/2011 exploratory laparotomy    HX OTHER SURGICAL      fem-pop    HX OTHER SURGICAL  02/2013    right upper extremity fistula    HX PACEMAKER  6/2009    AICD    HX SMALL BOWEL RESECTION      HX TRANSPLANT  2001     Kidney    HX TRANSPLANT  1992    kidney    HX UROLOGICAL      2 kidney transplants    NH EDG US EXAM SURGICAL ALTER STOM DUODENUM/JEJUNUM  7/15/2011         NH ESOPHAGOGASTRODUODENOSCOPY TRANSORAL DIAGNOSTIC  5/24/2012         VASCULAR SURGERY PROCEDURE UNLIST  11/14/2017    Insertion AV graft right upper arm (bovine); ligation of AV fistula right arm      Family History   Problem Relation Age of Onset    COPD Mother     Lung Disease Mother     Cancer Father         stomach    Cancer Maternal Grandmother      Social History     Socioeconomic History    Marital status: SINGLE     Spouse name: Not on file    Number of children: Not on file    Years of education: Not on file    Highest education level: Not on file   Social Needs    Financial resource strain: Not on file    Food insecurity - worry: Not on file    Food insecurity - inability: Not on file   [employer] needs - medical: Not on file   [employer] needs - non-medical: Not on file   Occupational History    Not on file   Tobacco Use    Smoking status: Never Smoker    Smokeless tobacco: Never Used   Substance and Sexual Activity    Alcohol use: No    Drug use: No    Sexual activity: Not on file   Other Topics Concern    Not on file   Social History Narrative    Not on file      Current Facility-Administered Medications   Medication Dose Route Frequency Provider Last Rate Last Dose    sodium chloride (NS) flush 5-10 mL  5-10 mL IntraVENous Q8H Sam Sosa MD   10 mL at 11/17/18 0700    sodium chloride (NS) flush 5-10 mL  5-10 mL IntraVENous PRN Sam Sosa MD        acetaminophen (TYLENOL) tablet 650 mg  650 mg Oral Q4H PRN Sam Sosa MD        ondansetron [facility]) injection 4 mg  4 mg IntraVENous Q6H PRN Pierre Li MD        oxyCODONE-acetaminophen (PERCOCET) 5-325 mg per tablet 1 Tab  1 Tab Oral Q8H PRN Pierre Li MD   1 Tab at 18 0454    piperacillin-tazobactam (ZOSYN) 3.375 g in 0.9% sodium chloride (MBP/ADV) 100 mL  3.375 g IntraVENous Q12H Emmanuelle Sommer MD 25 mL/hr at 18 0805 3.375 g at 18 0805    epoetin emilie (EPOGEN;PROCRIT) 14,000 Units  14,000 Units SubCUTAneous Q TUE, THU & SAT Tracy Munoz MD        vancomycin (VANCOCIN) 750 mg in 0.9% sodium chloride 250 mL ([id number])  750 mg IntraVENous DIALYSIS PRN Ravindra Hammond MD        And    Vancomycin HD reminder note   Other DAILY Ravindra Hammond MD   Stopped at 18 0900        Allergies   Allergen Reactions    Shellfish Containing Products Swelling     Break out in rash, trouble breathing    Heparin Analogues Other (comments)     Positive history of HIT    Iodinated Contrast- Oral And Iv Dye Other (comments)     Because of renal disease. No rash or hives per patient report 2012       Review of Systems:  A comprehensive review of systems was negative except for that written in the History of Present Illness. Objective:        Patient Vitals for the past 8 hrs:   BP Temp Pulse Resp SpO2   18 0728 110/65 98.5 °F (36.9 °C) 74 16 100 %   18 0421 114/73 97.9 °F (36.6 °C) 91 18 100 %   18 0410 115/75 99.7 °F (37.6 °C) 99 12 100 %   18 0400 115/75    100 %   18 0311     100 %       Temp (24hrs), Av.9 °F (37.7 °C), Min:97.9 °F (36.6 °C), Max:102.7 °F (39.3 °C)      Physical Exam:  GENERAL: alert, cooperative, no distress, appears stated age, HEART: regular rate and rhythm, S1, S2 normal, no murmur, click, rub or gallop, ABDOMEN: soft, non-tender. Bowel sounds normal. No masses,  no organomegaly, EXTREMITIES:  extremities normal, atraumatic, no cyanosis or edema, SKIN: Normal., NEUROLOGIC: negative Vascular: Right upper arm graft with bruit and thrill.   Femoral catheter without drainage    Assessment:     40 y/o AAM with right upper extremity access with possible catheter infection    Plan:     The right upper arm graft has a good thrill and bruit. Please use this for access while here. If works well, we can remove his catheter (regardless of infection). If his graft is indeed clotting as frequently as he states, we will need to do something different. I will ask Dr. Joanne Louise to followup on Monday once more culture data is back and for ultimate plan. We can manage further if Dr. Joanne Louise wishes.       Thanks for the consult  Signed By: Travon Sewell MD     November 17, 2018

## 2018-11-17 NOTE — PROGRESS NOTES
Vascular surgery consult called to Dr. Kirill Alexander. Dr. Kirill Alexander stated he does not do HD access. Called Vascular consult to Dr. Jinny Martínez

## 2018-11-17 NOTE — PROGRESS NOTES
Patient complains of progressive pain in his left flank he has some chronic left lower abdominal pain in the area of his transplant but in the last day or 2 he has had increased pain in the left lower back area. He is requesting some higher pain medicine he received fentanyl in the ER and the Percocet is not helping. He denies any shortness of breath or chest pain. He is on Coumadin for recurrent DVTs. He is on aspirin and Plavix because he has had prior MIs. He has had no nausea vomiting and has had no bowel movement in many days. Will advance his diet to renal diet since no procedures are planned. Will hold his Coumadin since he is currently supratherapeutic at 3.7. Will hold his aspirin and Plavix today and consider doing a CT scan of his abdomen and pelvis with contrast to evaluate the renal lesion further per urology's recommendations. He has an allergy to dye and would need premedication with Solu-Medrol and Benadryl. He has tolerated dye in the past with premedication but without it feels like his throat is closing. We will place him on Dilaudid as needed for severe pain and continue the Percocet as needed for moderate pain. He has had that catheter in his groin for 3 weeks and may need to be removed. [address] urology, nephrology and vascular evaluation. Discussed case with nephrology with regards to antiplatelet and anticoagulation and CT scan with contrast tomorrow to reevaluate lesion with follow-up dialysis on Monday. ID eval-p Please refer to this morning's H&P for further details regarding his care plan.

## 2018-11-17 NOTE — H&P
Hospitalist Admission NoteNAME: Ai Holguin :  1975 MRN:  [id number] Date/Time:  2018 6:01 AM 
 
Patient PCP: Edgar Wan MD 
______________________________________________________________________ Given the patient's current clinical presentation, I have a high level of concern for decompensation if discharged from the emergency department. Complex decision making was performed, which includes reviewing the patient's available past medical records, laboratory results, and x-ray films. My assessment of this patient's clinical condition and my plan of care is as follows. CT abdomen show  
: Increased density of lower pole left renal cyst likely reflects 
interval hemorrhage into cyst which could indicate underlying neoplasm though this is not clearly seen We ll hold coumadin Urology consult was placed Assessment / Plan: 
 
 
Sepsis most likely secondary to Admit patient to telemetry unit Start patient on broad-spectrum antibiotic Vanco and Zosyn Follow-up blood culture Vascular surgery consult  for dialysis catheter removal on obtain new access End-stage renal disease Nephrology consultation in the morning Continue hemodialysis as per nephrology History of DVT Hold coumadin INR supra theraputic Seizure We will continue home seizure medication when home med list available Coronary artery disease We will continue home medication Lipitor on Plavix and aspirin Peripheral vascular disease Continue Plavix aspirin Abdominal pain/back pain We will get abdominal CT scan Pharmacy consult for home med rec Code Status: full code Surrogate Decision Maker: 
Blanca Aguirre Sister DVT Prophylaxis: Coumadin GI Prophylaxis: not indicated Baseline: independent Subjective: CHIEF COMPLAINT: fever HISTORY OF PRESENT ILLNESS:    
 
41years old male from home with past medical history significant for AICD, coronary artery disease, small bowel obstruction, V. tach, end-stage renal disease, seizure, GERD, thrombocytopenia presented to the hospital complaining from fever associated with abdominal pain and back pain started today associated with chills patient denies any shortness of breath any cough, patient on home hemodialysis and patient dialyze himself Tuesday Wednesday Thursday Saturday and Sunday patient stated he does get right femoral dialysis catheter 3 weeks ago and he usually follow-up with nephrologist at [facility] patient stated he get femoral catheter because his fistula is not working We were asked to admit for work up and evaluation of the above problems. Past Medical History:  
Diagnosis Date  Abdominal hematoma  AICD (automatic cardioverter/defibrillator) present  CAD (coronary artery disease)   
 anterior MI s/p 2 stents  2007  Chronic abdominal pain  Chronic kidney disease ESRD; Dialysis dependent. Home [facility] does labs  DVT (deep venous thrombosis) ([address] 2001  DVT of popliteal vein ([address] 11/2011  
 not anticoagulated due to bleeding, IVC filter  Gallstone pancreatitis  Gastrointestinal disorder   
 acid reflux  Gastrointestinal disorder   
 peptic ulcer  Hemodialysis patient ([address]  High cholesterol  Hypertension  Kidney transplant b/l kidneys 1995, 2001  Nephrotic syndrome  Other ill-defined conditions(799.89)   
 kidny transplant x2,  on dialysis  Other ill-defined conditions(799.89)   
 home dialysis  Other ill-defined conditions(799.89)   
 hx recurrent left leg DVT  Peritonitis ([address]  Seizures ([address] 2015  
 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  
Procedure Laterality Date  CARDIAC SURG PROCEDURE UNLIST  2007  
 2 stents  HX APPENDECTOMY  HX CHOLECYSTECTOMY  HX OTHER SURGICAL    
 cholecystectomy  HX OTHER SURGICAL  11/2011  
 exploratory laparotomy  HX OTHER SURGICAL    
 fem-pop  HX OTHER SURGICAL  02/2013  
 right upper extremity fistula  HX PACEMAKER  6/2009 AICD  HX SMALL BOWEL RESECTION    
 HX TRANSPLANT  2001 Kidney  HX TRANSPLANT  1992  
 kidney  HX UROLOGICAL    
 2 kidney transplants  MI EDG US EXAM SURGICAL ALTER STOM DUODENUM/JEJUNUM  7/15/2011  MI ESOPHAGOGASTRODUODENOSCOPY TRANSORAL DIAGNOSTIC  5/24/2012  VASCULAR SURGERY PROCEDURE UNLIST  11/14/2017 Insertion AV graft right upper arm (bovine); ligation of AV fistula right arm Social History Tobacco Use  Smoking status: Never Smoker  Smokeless tobacco: Never Used Substance Use Topics  Alcohol use: No  
  
 
Family History Problem Relation Age of Onset  COPD Mother  Lung Disease Mother  Cancer Father   
     stomach  Cancer Maternal Grandmother Allergies Allergen Reactions  Shellfish Containing Products Swelling Break out in rash, trouble breathing  Heparin Analogues Other (comments) Positive history of HIT  
 Iodinated Contrast- Oral And Iv Dye Other (comments) Because of renal disease. No rash or hives per patient report 1/14/2012 Prior to Admission medications Medication Sig Start Date End Date Taking? Authorizing Provider  
oxyCODONE-acetaminophen (PERCOCET) 5-325 mg per tablet Take 1-2 Tabs by mouth every four (4) hours as needed for Pain. Max Daily Amount: 12 Tabs. 11/14/17   Lokesh Hutchison MD  
HYDROcodone-acetaminophen [facility]) 5-325 mg per tablet Take 1 Tab by mouth every four (4) hours as needed for Pain. Max Daily Amount: 6 Tabs. 9/23/17   Lois Victoria MD  
ondansetron (ZOFRAN ODT) 4 mg disintegrating tablet Take 1 Tab by mouth every eight (8) hours as needed for Nausea.  8/31/17   Alex Mehta MD  
 HYDROcodone-acetaminophen (NORCO) 5-325 mg per tablet Take 1 Tab by mouth every four (4) hours as needed for Pain. Max Daily Amount: 6 Tabs. 8/31/17   Shanell Doyle MD  
amLODIPine (NORVASC) 2.5 mg tablet Take 2.5 mg by mouth daily. Morro Padron MD  
omeprazole (PRILOSEC) 20 mg capsule Take 20 mg by mouth daily. Provider, Historical  
calcitRIOL (ROCALTROL) 0.5 mcg capsule Take 0.5 mcg by mouth daily. Provider, Historical  
levETIRAcetam (KEPPRA) 750 mg tablet Take 750 mg by mouth two (2) times a day. Morro Padron MD  
lacosamide (VIMPAT) 50 mg tab tablet Take 50 mg by mouth two (2) times a day. Morro Padron MD  
atorvastatin (LIPITOR) 20 mg tablet Take 20 mg by mouth daily. Morro Padron MD  
warfarin (COUMADIN) 5 mg tablet Decrease coumadin dose to 2.5mg while taking these antibiotics. Recheck INR every 2 days until you finish the antibiotics. Patient taking differently: Take 3.5 mg by mouth six (6) days a week. Monday thru Saturday  Indications: 5 mg m-T-W-Th, S-S       Friday 2.5mg 9/9/15   Kady Alfaro MD  
clopidogrel (PLAVIX) 75 mg tablet Take  by mouth daily. Morro Padron MD  
sevelamer carbonate (RENVELA) 800 mg Tab tab Take 2,400 mg by mouth three (3) times daily (with meals). Morro Padron MD  
aspirin 81 mg chewable tablet Take 81 mg by mouth daily. Morro Padron MD  
 
 
REVIEW OF SYSTEMS:    
I am not able to complete the review of systems because: The patient is intubated and sedated The patient has altered mental status due to his acute medical problems The patient has baseline aphasia from prior stroke(s) The patient has baseline dementia and is not reliable historian The patient is in acute medical distress and unable to provide information Total of 12 systems reviewed as follows:   
   POSITIVE= underlined text  Negative = text not underlined General:  fever, chills, sweats, generalized weakness, weight loss/gain,  
   loss of appetite Eyes:    blurred vision, eye pain, loss of vision, double vision ENT:    rhinorrhea, pharyngitis Respiratory:   cough, sputum production, SOB, ZAMUDIO, wheezing, pleuritic pain  
Cardiology:   chest pain, palpitations, orthopnea, PND, edema, syncope Gastrointestinal:  abdominal pain , N/V, diarrhea, dysphagia, constipation, bleeding Genitourinary:  frequency, urgency, dysuria, hematuria, incontinence Muskuloskeletal :  arthralgia, myalgia, back pain Hematology:  easy bruising, nose or gum bleeding, lymphadenopathy Dermatological: rash, ulceration, pruritis, color change / jaundice Endocrine:   hot flashes or polydipsia Neurological:  headache, dizziness, confusion, focal weakness, paresthesia, Speech difficulties, memory loss, gait difficulty Psychological: Feelings of anxiety, depression, agitation Objective: VITALS:   
Visit Vitals /73 (BP 1 Location: Left arm, BP Patient Position: Sitting) Pulse 91 Temp 97.9 °F (36.6 °C) Resp 18 Ht 5' 7\" (1.702 m) Wt 67.6 kg (149 lb 0.5 oz) SpO2 100% BMI 23.34 kg/m² PHYSICAL EXAM: 
 
General:    Alert, cooperative, no distress, appears stated age. HEENT: Atraumatic, anicteric sclerae, pink conjunctivae No oral ulcers, mucosa moist, throat clear, dentition fair Neck:  Supple, symmetrical,  thyroid: non tender Lungs:   Clear to auscultation bilaterally. No Wheezing or Rhonchi. No rales. Chest wall:  No tenderness  No Accessory muscle use. Heart:   Regular  rhythm,  No  murmur   No edema Abdomen:   Soft, tender lower abdomen . Not distended. Bowel sounds normal , mild back tenderness Extremities: No cyanosis. No clubbing,   
  Skin turgor normal, Capillary refill normal, Radial dial pulse 2+ Skin:     Not pale. Not Jaundiced  No rashes Psych:  Good insight. Not depressed. Not anxious or agitated. Neurologic: EOMs intact. No facial asymmetry.  No aphasia or slurred speech. Symmetrical strength, Sensation grossly intact. Alert and oriented X 4.  
 
_______________________________________________________________________ Care Plan discussed with: 
  Comments Patient y Family RN Care Manager Consultant:     
_______________________________________________________________________ Expected  Disposition:  
Home with Family y HH/PT/OT/RN   
SNF/LTC   
[facility]   
________________________________________________________________________ TOTAL TIME:  70  Minutes Critical Care Provided     Minutes non procedure based Comments  
 y Reviewed previous records  
>50% of visit spent in counseling and coordination of care y Discussion with patient and/or family and questions answered 
  
 
________________________________________________________________________ Signed: Toy Myrick MD 
 
Procedures: see electronic medical records for all procedures/Xrays and details which were not copied into this note but were reviewed prior to creation of Plan. LAB DATA REVIEWED:   
Recent Results (from the past 24 hour(s)) CBC WITH AUTOMATED DIFF Collection Time: 11/16/18  8:32 PM  
Result Value Ref Range WBC 9.4 4.1 - 11.1 K/uL  
 RBC 2.57 (L) 4.10 - 5.70 M/uL HGB 8.2 (L) 12.1 - 17.0 g/dL HCT 24.1 (L) 36.6 - 50.3 % MCV 93.8 80.0 - 99.0 FL  
 MCH 31.9 26.0 - 34.0 PG  
 MCHC 34.0 30.0 - 36.5 g/dL  
 RDW 13.5 11.5 - 14.5 % PLATELET 341 (L) 223 - 400 K/uL MPV 11.4 8.9 - 12.9 FL  
 NRBC 0.0 0  WBC ABSOLUTE NRBC 0.00 0.00 - 0.01 K/uL NEUTROPHILS 77 (H) 32 - 75 % LYMPHOCYTES 6 (L) 12 - 49 % MONOCYTES 10 5 - 13 % EOSINOPHILS 7 0 - 7 % BASOPHILS 0 0 - 1 % IMMATURE GRANULOCYTES 0 0.0 - 0.5 % ABS. NEUTROPHILS 7.2 1.8 - 8.0 K/UL  
 ABS. LYMPHOCYTES 0.6 (L) 0.8 - 3.5 K/UL  
 ABS. MONOCYTES 0.9 0.0 - 1.0 K/UL  
 ABS. EOSINOPHILS 0.7 (H) 0.0 - 0.4 K/UL  
 ABS. BASOPHILS 0.0 0.0 - 0.1 K/UL ABS. IMM. GRANS. 0.0 0.00 - 0.04 K/UL  
 DF SMEAR SCANNED    
 RBC COMMENTS ANISOCYTOSIS 
1+ METABOLIC PANEL, COMPREHENSIVE Collection Time: 11/16/18  8:32 PM  
Result Value Ref Range Sodium 132 (L) 136 - 145 mmol/L Potassium 2.7 (LL) 3.5 - 5.1 mmol/L Chloride 96 (L) 97 - 108 mmol/L  
 CO2 27 21 - 32 mmol/L Anion gap 9 5 - 15 mmol/L Glucose 111 (H) 65 - 100 mg/dL BUN 24 (H) 6 - 20 MG/DL Creatinine 7.45 (H) 0.70 - 1.30 MG/DL  
 BUN/Creatinine ratio 3 (L) 12 - 20 GFR est AA 10 (L) >60 ml/min/1.73m2 GFR est non-AA 8 (L) >60 ml/min/1.73m2 Calcium 8.4 (L) 8.5 - 10.1 MG/DL Bilirubin, total 0.5 0.2 - 1.0 MG/DL  
 ALT (SGPT) 23 12 - 78 U/L  
 AST (SGOT) 53 (H) 15 - 37 U/L Alk. phosphatase 105 45 - 117 U/L Protein, total 7.7 6.4 - 8.2 g/dL Albumin 3.6 3.5 - 5.0 g/dL Globulin 4.1 (H) 2.0 - 4.0 g/dL A-G Ratio 0.9 (L) 1.1 - 2.2 LACTIC ACID Collection Time: 11/16/18  8:32 PM  
Result Value Ref Range Lactic acid 1.2 0.4 - 2.0 MMOL/L  
MAGNESIUM Collection Time: 11/16/18  8:32 PM  
Result Value Ref Range Magnesium 1.8 1.6 - 2.4 mg/dL PTT Collection Time: 11/16/18  9:29 PM  
Result Value Ref Range aPTT 56.9 (H) 22.1 - 32.0 sec  
 aPTT, therapeutic range     58.0 - 77.0 SECS  
PROTHROMBIN TIME + INR Collection Time: 11/16/18  9:29 PM  
Result Value Ref Range INR 4.8 (HH) 0.9 - 1.1 Prothrombin time 45.3 (H) 9.0 - 11.1 sec

## 2018-11-17 NOTE — PROGRESS NOTES
Pharmacy Automatic Renal Dosing Protocol - Antimicrobials Indication for Antimicrobials: bacteremia Current Regimen of Each Antimicrobial: 
Vancomycin pharmacy to dose (Start Date ; Day # 1) Piperacillin-tazobactam 3.375 gm IV x 1 dose (Start Date ; Day # 1) Previous Antimicrobial Therapy: 
 
Goal Level: VANCOMYCIN TROUGH GOAL RANGE Vancomycin Trough: 15 - 20 mcg/mL Date Dose & Interval Measured (mcg/mL) Extrapolated (mcg/mL) Significant Cultures:  
 paired blood: pending Radiology / Imaging results: (X-ray, CT scan or MRI):  
 
Paralysis, amputations, malnutrition: none noted Labs: 
Recent Labs 18 CREA 7.45* BUN 24* WBC 9.4 Temp (24hrs), Av.8 °F (38.8 °C), Min:100.9 °F (38.3 °C), Max:102.7 °F (39.3 °C) Creatinine Clearance (mL/min) or Dialysis: ESRD on HD Impression/Plan: · Vancomycin loading dose of 1500 mg x 1. Maintenance dose will be 750 mg to be given at the end of each HD. · Recommended to monitor the random level before the second dose of 750 mg and periodically therafter · Antimicrobial stop date: to be determined Pharmacy will follow daily and adjust medications as appropriate for renal function and/or serum levels. Thank you, Michael Hanson, PHARMD

## 2018-11-17 NOTE — PROGRESS NOTES
TRANSFER - IN REPORT: 
 
Verbal report received from Marce(name) on Tasha Barraza  being received from ED(unit) for routine progression of care Report consisted of patients Situation, Background, Assessment and  
Recommendations(SBAR). Information from the following report(s) SBAR, Kardex, ED Summary, Intake/Output and MAR was reviewed with the receiving nurse. Opportunity for questions and clarification was provided. Assessment completed upon patients arrival to unit and care assumed.

## 2018-11-17 NOTE — CONSULTS
[facility]  MR#: [id number]  : 1975  ACCOUNT #: [de-identified]   DATE OF SERVICE: 2018    REASON FOR CONSULTATION:  Left lower pole cyst.    HISTORY OF PRESENT ILLNESS:  He is a 31-year-old  male with a history of chronic kidney failure, status post bilateral transplant kidneys with persistent renal failure on dialysis. He is now admitted with a fever and presumed sepsis. His CT, which I have reviewed demonstrates increased size in the left lower pole complex cyst with findings consistent with possible hemorrhage. He has had several days of left flank pain. Significant comorbidities include Coumadin for history of DVT, renal failure as above with vascular surgery consulted for dialysis catheter, history of coronary artery disease, peripheral vascular disease, on Plavix and aspirin, seizures and multiple abdominal procedures including bowel resection. He denies any production of urine; therefore, cannot evaluate hematuria. PAST MEDICAL HISTORY:  In addition to the above include V-tach, GERD, thrombocytopenia AICD, coronary stents, chronic abdominal pain, gallstone pancreatitis, hypertension peritonitis HIV. PAST SURGICAL HISTORY:  Positive appendectomy, cholecystectomy, fem pop bypass. MEDICATIONS:  On the chart and were reviewed. ALLERGIES:  INCLUDE HEPARIN AND SHELLFISH. REVIEW OF SYSTEMS:  As above plus fever, chills, nausea, vomiting. PHYSICAL EXAMINATION:  VITAL SIGNS:  Include temperature of 102.7 about 12 hours ago, now afebrile with normal other vital signs. GENERAL:  He is in no apparent distress. Breathing without difficulty. NECK:  Appears supple. No jaundice. ABDOMEN:  He does have mild CVA tenderness and diffuse left greater than right abdominal tenderness without peritoneal signs. His abdomen is immensely scarred.    GENITALIA:  Including penis meatus, scrotum, testicles and groins were all normal.  No evidence of herniation. Rectal exam was not done. EXTREMITIES:  He has a right femoral catheter and upper extremity AV fistula. SKIN:  Cool to touch. NEUROPSYCHIATRIC:  Limited by bedrest.  PSYCHIATRIC:  Appears normal.    IMAGING:  As above. LABORATORY DATA:  Include a white count of 10.7, INR of 4.8 decreasing to 3.7 and chemistries consistent with his renal failure. Blood cultures are pending. ASSESSMENT AND PLAN:  Complex cyst, left lower pole of atrophic kidney, possible hemorrhaging cyst causing pain, although I note he has chronic abdominal pain, bilateral renal atrophy and transplant kidney atrophy with renal failure, etc.  There is a question of IV CONTRAST ALLERGY versus avoidance. RECOMMENDATIONS:  He is obviously not a good surgical candidate in any form or fashion. If it is hemorrhagic cyst, this just needs to be treated symptomatically. If it is a neoplasm, ablation with CT guidance versus surgery could be considered, but are high risk. I will defer to the nephrologist as to whether or not we can get a contrast renal protocol CT to better define this mass. Whether or not this can be done, we will consider a CT-guided renal biopsy of the left lower pole mass by interventional radiology before making any recommendations for intervention. In the meantime, symptom control and workup for his fever, which is highly unlikely to be from this renal source.       MD PIERO Law / ALMA ROSA  D: 11/17/2018 10:01     T: 11/17/2018 12:59  JOB #: [id number]

## 2018-11-17 NOTE — ED NOTES
Assumed care of pt from triage. Pt c/o fever, n/v, feeling unwell after finishing dialysis at home approx 1830. Pt took tylenol for fever approx 1930. Pt believes dialysis catheter in right groin may be infected as he immediately felt unwell after flushing cath. Pt has old graft to right upper arm. Placed pt on monitor x3. Pt A&Ox4 and in no acute distress at this time. Call bell within reach.

## 2018-11-17 NOTE — ED PROVIDER NOTES
EMERGENCY DEPARTMENT HISTORY AND PHYSICAL EXAM 
 
 
Date: 11/16/2018 Patient Name: Haleigh Escamilla History of Presenting Illness Chief Complaint Patient presents with  Vascular Access Problem Thinks he has a infection in his dialysis catheter. After dialysis today his temperature shot up. History Provided By: Patient HPI: Haleigh Escamilla, 41 y.o. male with PMHx significant for AICD, CAD, SBO, V-tach, ESRD on HD, DVT, GERD, seizures, thrombocytopenia, presents via WC to the ED with cc of fever, n/v, and abd pain s/p flushing dialysis cath in R groin at home PTA. Pt reports that he does dialysis on himself at home; he dialyzes Tuesday, Wednesday, Thursday, Saturday, and Sunday. He states that after flushing dialysis catheter, he was unable to finish due to symptoms as well as BP reading of 88/46. He denies any redness or pain at catheter site. He reports that his cath has been in place for 3 wks. He reports taking Tylenol, but with no relief. Pt is anuric secondary to ESRD. Pt's nephrologist is at [facility]. Additionally, pt specifically denies any recent chills, headache, diarrhea, CP, SOB, lightheadedness, dizziness, numbness, weakness, tingling, BLE swelling, heart palpitations, changes in BM, changes in PO intake, melena, hematochezia, cough, or congestion. PCP: Julius Agarwal MD 
 
Social Hx:  
Social History Tobacco Use Smoking Status Never Smoker Smokeless Tobacco Never Used  
,  
Social History Substance and Sexual Activity Alcohol Use No  
,  
Social History Substance and Sexual Activity Drug Use No  
 
 
There are no other complaints, changes or physical findings at this time. Past History Past Surgical History: 
Past Surgical History:  
Procedure Laterality Date  CARDIAC SURG PROCEDURE UNLIST  2007  
 2 stents  HX APPENDECTOMY  HX CHOLECYSTECTOMY  HX OTHER SURGICAL    
 cholecystectomy  HX OTHER SURGICAL  11/2011 exploratory laparotomy  HX OTHER SURGICAL    
 fem-pop  HX OTHER SURGICAL  02/2013  
 right upper extremity fistula  HX PACEMAKER  6/2009 AICD  HX SMALL BOWEL RESECTION    
 HX TRANSPLANT  2001 Kidney  HX TRANSPLANT  1992  
 kidney  HX UROLOGICAL    
 2 kidney transplants  UT EDG US EXAM SURGICAL ALTER STOM DUODENUM/JEJUNUM  7/15/2011  UT ESOPHAGOGASTRODUODENOSCOPY TRANSORAL DIAGNOSTIC  5/24/2012  VASCULAR SURGERY PROCEDURE UNLIST  11/14/2017 Insertion AV graft right upper arm (bovine); ligation of AV fistula right arm Family History: 
Family History Problem Relation Age of Onset  COPD Mother  Lung Disease Mother  Cancer Father   
     stomach  Cancer Maternal Grandmother Allergies: Allergies Allergen Reactions  Shellfish Containing Products Swelling Break out in rash, trouble breathing  Heparin Analogues Other (comments) Positive history of HIT  
 Iodinated Contrast- Oral And Iv Dye Other (comments) Because of renal disease. No rash or hives per patient report 1/14/2012 Review of Systems Review of Systems Constitutional: Positive for fever. Negative for chills. HENT: Negative. Negative for congestion, facial swelling, rhinorrhea, sore throat, trouble swallowing and voice change. Eyes: Negative. Respiratory: Negative. Negative for apnea, cough, chest tightness, shortness of breath and wheezing. Cardiovascular: Negative. Negative for chest pain, palpitations and leg swelling. Gastrointestinal: Positive for abdominal pain, nausea and vomiting. Negative for abdominal distention, blood in stool, constipation and diarrhea. Endocrine: Negative. Negative for cold intolerance, heat intolerance and polyuria. Genitourinary: Negative. Negative for difficulty urinating, dysuria, flank pain, frequency, hematuria and urgency. Musculoskeletal: Negative. Negative for arthralgias, back pain, myalgias, neck pain and neck stiffness. Skin: Negative. Negative for color change and rash. Neurological: Negative. Negative for dizziness, syncope, facial asymmetry, speech difficulty, weakness, light-headedness, numbness and headaches. Hematological: Negative. Does not bruise/bleed easily. Psychiatric/Behavioral: Negative. Negative for confusion and self-injury. The patient is not nervous/anxious. Physical Exam  
Physical Exam  
Constitutional: He is oriented to person, place, and time. He is cooperative. Non-toxic appearance. He has a sickly appearance. He appears ill. HENT:  
Head: Normocephalic and atraumatic. Mouth/Throat: Mucous membranes are normal. No posterior oropharyngeal erythema. Eyes: Conjunctivae and EOM are normal. Pupils are equal, round, and reactive to light. Neck: Normal range of motion. Cardiovascular: Normal rate, regular rhythm, normal heart sounds and intact distal pulses. Exam reveals no gallop and no friction rub. No murmur heard. Pulmonary/Chest: Effort normal and breath sounds normal. No respiratory distress. He has no wheezes. He has no rales. He exhibits no tenderness. Abdominal: Soft. Bowel sounds are normal. He exhibits no distension and no mass. There is no tenderness. There is no rebound and no guarding. Musculoskeletal: Normal range of motion. He exhibits no edema or deformity. Min tenderness in R medial thigh. Dialysis line in place with dress at R medial thigh. Right upper arm graft with bruit and thrill Neurological: He is alert and oriented to person, place, and time. He displays normal reflexes. No cranial nerve deficit. He exhibits normal muscle tone. Coordination normal.  
Skin: Skin is warm. No rash noted. Psychiatric: He has a normal mood and affect. Nursing note and vitals reviewed. Diagnostic Study Results Labs - 
  
 Recent Results (from the past 12 hour(s)) CBC WITH AUTOMATED DIFF Collection Time: 11/16/18  8:32 PM  
Result Value Ref Range WBC 9.4 4.1 - 11.1 K/uL  
 RBC 2.57 (L) 4.10 - 5.70 M/uL HGB 8.2 (L) 12.1 - 17.0 g/dL HCT 24.1 (L) 36.6 - 50.3 % MCV 93.8 80.0 - 99.0 FL  
 MCH 31.9 26.0 - 34.0 PG  
 MCHC 34.0 30.0 - 36.5 g/dL  
 RDW 13.5 11.5 - 14.5 % PLATELET 147 (L) 032 - 400 K/uL MPV 11.4 8.9 - 12.9 FL  
 NRBC 0.0 0  WBC ABSOLUTE NRBC 0.00 0.00 - 0.01 K/uL NEUTROPHILS 77 (H) 32 - 75 % LYMPHOCYTES 6 (L) 12 - 49 % MONOCYTES 10 5 - 13 % EOSINOPHILS 7 0 - 7 % BASOPHILS 0 0 - 1 % IMMATURE GRANULOCYTES 0 0.0 - 0.5 % ABS. NEUTROPHILS 7.2 1.8 - 8.0 K/UL  
 ABS. LYMPHOCYTES 0.6 (L) 0.8 - 3.5 K/UL  
 ABS. MONOCYTES 0.9 0.0 - 1.0 K/UL  
 ABS. EOSINOPHILS 0.7 (H) 0.0 - 0.4 K/UL  
 ABS. BASOPHILS 0.0 0.0 - 0.1 K/UL  
 ABS. IMM. GRANS. 0.0 0.00 - 0.04 K/UL  
 DF SMEAR SCANNED    
 RBC COMMENTS ANISOCYTOSIS 
1+ METABOLIC PANEL, COMPREHENSIVE Collection Time: 11/16/18  8:32 PM  
Result Value Ref Range Sodium 132 (L) 136 - 145 mmol/L Potassium 2.7 (LL) 3.5 - 5.1 mmol/L Chloride 96 (L) 97 - 108 mmol/L  
 CO2 27 21 - 32 mmol/L Anion gap 9 5 - 15 mmol/L Glucose 111 (H) 65 - 100 mg/dL BUN 24 (H) 6 - 20 MG/DL Creatinine 7.45 (H) 0.70 - 1.30 MG/DL  
 BUN/Creatinine ratio 3 (L) 12 - 20 GFR est AA 10 (L) >60 ml/min/1.73m2 GFR est non-AA 8 (L) >60 ml/min/1.73m2 Calcium 8.4 (L) 8.5 - 10.1 MG/DL Bilirubin, total 0.5 0.2 - 1.0 MG/DL  
 ALT (SGPT) 23 12 - 78 U/L  
 AST (SGOT) 53 (H) 15 - 37 U/L Alk. phosphatase 105 45 - 117 U/L Protein, total 7.7 6.4 - 8.2 g/dL Albumin 3.6 3.5 - 5.0 g/dL Globulin 4.1 (H) 2.0 - 4.0 g/dL A-G Ratio 0.9 (L) 1.1 - 2.2 LACTIC ACID Collection Time: 11/16/18  8:32 PM  
Result Value Ref Range Lactic acid 1.2 0.4 - 2.0 MMOL/L  
MAGNESIUM Collection Time: 11/16/18  8:32 PM  
Result Value Ref Range Magnesium 1.8 1.6 - 2.4 mg/dL PTT Collection Time: 11/16/18  9:29 PM  
Result Value Ref Range aPTT 56.9 (H) 22.1 - 32.0 sec  
 aPTT, therapeutic range     58.0 - 77.0 SECS  
PROTHROMBIN TIME + INR Collection Time: 11/16/18  9:29 PM  
Result Value Ref Range INR 4.8 (HH) 0.9 - 1.1 Prothrombin time 45.3 (H) 9.0 - 11.1 sec Radiologic Studies -  
XR CHEST PORT Final Result CT Results  (Last 48 hours) None CXR Results  (Last 48 hours)  
          
 11/16/18 2056  XR CHEST PORT Final result Impression:  IMPRESSION: No evidence of acute cardiopulmonary process. Narrative:  INDICATION:  acute fever, vascular access infection COMPARISON: 6/1/2017 FINDINGS: Single AP portable view of the chest obtained at 2048 demonstrates a  
stable cardiomediastinal silhouette. The lungs are clear bilaterally. No osseous  
abnormalities are seen. There is a left-sided pacemaker device. Right arm  
vascular stents are noted. Medical Decision Making I am the first provider for this patient. I reviewed the vital signs, available nursing notes, past medical history, past surgical history, family history and social history. Vital Signs-Reviewed the patient's vital signs. Patient Vitals for the past 12 hrs: 
 Temp Pulse Resp BP SpO2  
11/17/18 0010  99 22 117/81 95 % 11/16/18 2100  (!) 104 25 115/75 99 % 11/16/18 2051 (!) 100.9 °F (38.3 °C)      
11/16/18 2049  (!) 101 19  97 % 11/16/18 2043    116/75   
11/16/18 2012 (!) 102.7 °F (39.3 °C) (!) 107 20 122/69 98 % Pulse Oximetry Analysis - 98% on RA Records Reviewed: Nursing Notes, Old Medical Records, Previous electrocardiograms, Previous Radiology Studies and Previous Laboratory Studies Provider Notes (Medical Decision Making):  
Patient presents with fever, tachycardia and concerns for infection.   Most likely vascular access infection  DDx: sepsis 2/2 UTI, PNA, intraabdominal infection (colitis, appendicitis, cholecystitis),  infectious diarrhea, meningitis, soft tissue infection, septic arthritis, flu/viral prodrome. Will follow sepsis protocol and order set by providing IVF resuscitation, obtaining blood and urine cultures, antibiotics, labs, lactate, EKG and frequently reassessing hemodynamic status on the patient. Will hold off on aggressive fluid hydration unless patient shows signs of severe sepsis or shock. ED Course:  
Initial assessment performed. The patients presenting problems have been discussed, and they are in agreement with the care plan formulated and outlined with them. I have encouraged them to ask questions as they arise throughout their visit. Medications  
vancomycin (VANCOCIN) 750 mg in 0.9% sodium chloride 250 mL ([id number]) (not administered) And  
Vancomycin HD reminder note (0 Each Other Held 11/17/18 0900)  
sodium chloride (NS) flush 5-10 mL (10 mL IntraVENous Given 11/18/18 0659)  
sodium chloride (NS) flush 5-10 mL (not administered)  
acetaminophen (TYLENOL) tablet 650 mg (not administered)  
ondansetron (ZOFRAN) injection 4 mg (not administered)  
oxyCODONE-acetaminophen (PERCOCET) 5-325 mg per tablet 1 Tab (1 Tab Oral Given 11/18/18 0932) piperacillin-tazobactam (ZOSYN) 3.375 g in 0.9% sodium chloride (MBP/ADV) 100 mL (3.375 g IntraVENous New Bag 11/18/18 0659)  
epoetin emilie (EPOGEN;PROCRIT) 14,000 Units (14,000 Units SubCUTAneous Given 11/17/18 1608) bisacodyl (DULCOLAX) suppository 10 mg (not administered)  
magnesium citrate solution 296 mL (not administered) HYDROmorphone (DILAUDID) injection 0.5 mg (0.5 mg IntraVENous Given 11/18/18 1120)  
naloxone (NARCAN) injection 0.1 mg (not administered)  
atorvastatin (LIPITOR) tablet 20 mg (20 mg Oral Given 11/18/18 0932) calcitRIOL (ROCALTROL) capsule 0.5 mcg (0.5 mcg Oral Given 11/18/18 0932) lacosamide (VIMPAT) tablet 50 mg (50 mg Oral Given 11/18/18 0932) levETIRAcetam (KEPPRA) tablet 750 mg (750 mg Oral Given 11/18/18 0932) pantoprazole (PROTONIX) tablet 40 mg (40 mg Oral Given 11/18/18 0932) sevelamer carbonate (RENVELA) tab 2,400 mg (2,400 mg Oral Given 11/18/18 1322) potassium chloride (K-DUR, KLOR-CON) SR tablet 40 mEq (40 mEq Oral Given 11/16/18 2202) fentaNYL citrate (PF) injection 100 mcg (100 mcg IntraVENous Given 11/16/18 2202) vancomycin (VANCOCIN) 1500 mg in  ml infusion (1,500 mg IntraVENous New Bag 11/16/18 2251) piperacillin-tazobactam (ZOSYN) 3.375 g in 0.9% sodium chloride (MBP/ADV) 100 mL ADV (3.375 g IntraVENous Given 11/16/18 2215) potassium chloride 10 mEq in 100 ml IVPB (10 mEq IntraVENous New Bag 11/17/18 0455) potassium chloride (KLOR-CON) packet 20 mEq (20 mEq Oral Given 11/17/18 0455) Progress Note: 
8:20PM 
Pt febrile 102.7, tach 107, sepsis protocol initiated. 9:10PM 
Notified by RN of abnormal electrolytes, will correct. 10:00PM 
Pt given Vanc and Zosyn; concern for line infection; pt does have a RUE fistula that could be potentially used for HD. Will defer to vascular and nephrology. Will need admission. Progress Note: 
CONSULT NOTE:  
12:25 AM 
Sea Romero MD spoke with Dr. Connor Urrutia, Specialty: Hospitalist 
Discussed pt's hx, disposition, and available diagnostic and imaging results. Reviewed care plans. Consultant will evaluate pt for admission. Written by KAYLEY Hagen, as dictated by Sea Romero MD 
 
Critical Care Time: CRITICAL CARE NOTE : 
 
1:00AM 
 
IMPENDING DETERIORATION -Airway, Respiratory, Cardiovascular, Metabolic and Renal 
ASSOCIATED RISK FACTORS - Hypotension, Shock, Dysrhythmia and Metabolic changes MANAGEMENT- Bedside Assessment and Supervision of Care INTERPRETATION -  Xrays, CT Scan, Blood Gases, ECG, Blood Pressure and Cardiac Output Measures INTERVENTIONS - hemodynamic mngmt and Metobolic interventions CASE REVIEW - Hospitalist, Nursing and Family TREATMENT RESPONSE -Stable PERFORMED BY - Self NOTES   : 
 
I have spent 60 minutes of critical care time involved in lab review, consultations with specialist, family decision- making, bedside attention and documentation. During this entire length of time I was immediately available to the patient . Critical Care: The reason for providing this level of medical care for this critically ill patient was due to a critical illness that impaired one or more vital organ systems, such that there was a high probability of imminent or life threatening deterioration in the patient's condition. This care involved high complexity decision making to assess, manipulate, and support vital system functions, to treat this degree of vital organ system failure, and to prevent further life threatening deterioration of the patients condition. Jaida España MD 
 
 
1:38 AM - I suspect that this patient has an active infection. 1:38 AM - The patient met criteria for severe sepsis at this time. PROVIDER SEPSIS PHYSICAL EXAM EVAL Vital signs reviewed (see nursing documentation for further details): Vitals:  
 11/16/18 2049 11/16/18 2051 11/16/18 2100 11/17/18 0010 BP:   115/75 117/81 Pulse: (!) 101  (!) 104 99 Resp: 19  25 22 Temp:  (!) 100.9 °F (38.3 °C) SpO2: 97%  99% 95% Cardiac exam:Tachycardic Pulmonary exam:Clear Lungs Peripheral pulses:Normal 
Capillary refill:Normal 
Skin exam:pink Exam performed Shyanne Jaime MD 
 
SEP-1 Core Measure Exclusion Criteria Elevated SCr due toESRD Has the patient/family refused IV fluids? yes Discussion on IVF Resuscitation: This patient has been identified as at risk for severe sepsis based on their elevated lactate level.  
 
Based on this patient's current presentation and significant PMH (ESRD) it is likely that aggressive fluid resuscitation efforts (e.g. 30cc/kg IVF bolus) would be detrimental to their health. I have discussed my concerns,  the risks and possible benefits of this recommended therapy with the patient and/or family who agree that the risks are too great and REFUSES further aggressive fluid resuscitation at this time. The patient will continue to be monitored closely and frequently re-evaluated for any potential changes that may be needed in their plan of care. Disposition: PLAN FOR ADMISSION: 
12:25 AM 
The patient is being admitted to the hospital. The results of their tests and reasons for their admission have been discussed with the patient and/or available family. The patient/family has conveyed agreement and understanding for the need to be admitted and for their admission diagnosis. Consultation has been made with the inpatient physician specialist for hospitalization. Written by Gage Ryan, ED Scribe, as dictated by Tamir Marcano MD 
 
Diagnosis Clinical Impression: 1. Sepsis, due to unspecified organism ([address] 2. ESRD (end stage renal disease) on dialysis ([address] 3. Hypotension, unspecified hypotension type 4. Acute hypokalemia 5. Supratherapeutic INR Attestations: This note is prepared by Gage Ryan, acting as scribe for MD Tamir Barton MD: The scribe's documentation has been prepared under my direction and personally reviewed by me in its entirety. I confirm that the note above accurately reflects all work, treatment, procedures, and medical decision making performed by me. This note will not be viewable in [address]

## 2018-11-17 NOTE — CONSULTS
Nephrology Consult Note     Blaise Deana     www. [facility]                    Phone - [phone]   Patient: Tasha Barraza  YOB: 1975     Patient: Tasha Barraza   YOB: 1975    Date- 11/17/2018  MRN: [id number]            REASON FOR CONSULTATION: Continuation of hemodialysis in patient with ESRD  CONSULTING PHYSICIAN:     ADMIT DATE:11/16/2018 PATIENT PCP:Alaina Moser MD     ASSESSMENT:   End Stage Renal Disease -  Anemia of CKD  Secondary hyperparathyroidism of renal origin  Sepsis  H/o hypertension    PLAN:   No dialysis today- he had hd on Friday prior to admission  Next hd on moday  Hold norvasc due to low bp  Continue calcitriol  Continue renvela  Follow blood cultures. If his blood cultures are positive- we will have to remove his permacath. Give epogen today      · Active Problems:  ·   Sepsis ([address] (11/17/2018)  ·   ·     Subjective:   HPI: Tasha Barraza is a  [facility] male with pmh of ESRD. The patient is followed by  Dr.TODD Kim Parada. Dialysis is performed via RIGHT groin permacath  He is on home hemodialysis. 5 days per week 2.5 hr per day  He presented to er with fever  He had dialysis at home prior to admission  He has right arm avf which was clotting frequently. He has right groin tunneled hd cath for last 3 weeks  No c/o sob,  No c/o chest pain     He has h/o nephrotic syndrome in past  He started dialysis in 1989. He was started on pd at that time. He got his first renal tx in 1992. He was back on pd in [address] [place] [address]. He continued on pd till 2001. He received living related renal tx from sister in 2001. He is on hemodialysis since 2012 . He is on home hemo.         Past Medical History:   Diagnosis Date    Abdominal hematoma     AICD (automatic cardioverter/defibrillator) present     CAD (coronary artery disease)     anterior MI s/p 2 stents  2007    Chronic abdominal pain     Chronic kidney disease     ESRD; Dialysis dependent. Home [facility] does labs    DVT (deep venous thrombosis) ([address] 2001    DVT of popliteal vein ([address] 11/2011    not anticoagulated due to bleeding, IVC filter    Gallstone pancreatitis     Gastrointestinal disorder     acid reflux    Gastrointestinal disorder     peptic ulcer    Hemodialysis patient ([address]     High cholesterol     Hypertension     Kidney transplant     b/l kidneys 1995, 2001    Nephrotic syndrome     Other ill-defined conditions(799.89)     kidny transplant x2,  on dialysis    Other ill-defined conditions(799.89)     home dialysis    Other ill-defined conditions(799.89)     hx recurrent left leg DVT    Peritonitis ([address]     Seizures ([address] 2015    most recent- only had three in lifetime    Small bowel obstruction (HCC)     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    V-tach [facility])       Past Surgical History:   Procedure Laterality Date    CARDIAC SURG PROCEDURE UNLIST  2007    2 stents    HX APPENDECTOMY      HX CHOLECYSTECTOMY      HX OTHER SURGICAL      cholecystectomy    HX OTHER SURGICAL  11/2011    exploratory laparotomy    HX OTHER SURGICAL      fem-pop    HX OTHER SURGICAL  02/2013    right upper extremity fistula    HX PACEMAKER  6/2009    AICD    HX SMALL BOWEL RESECTION      HX TRANSPLANT  2001     Kidney    HX TRANSPLANT  1992    kidney    HX UROLOGICAL      2 kidney transplants    [place] EDG US EXAM SURGICAL ALTER STOM DUODENUM/JEJUNUM  7/15/2011         [place] ESOPHAGOGASTRODUODENOSCOPY TRANSORAL DIAGNOSTIC  5/24/2012         VASCULAR SURGERY PROCEDURE UNLIST  11/14/2017    Insertion AV graft right upper arm (bovine); ligation of AV fistula right arm      Prior to Admission medications    Medication Sig Start Date End Date Taking? Authorizing Provider   oxyCODONE-acetaminophen (PERCOCET) 5-325 mg per tablet Take 1-2 Tabs by mouth every four (4) hours as needed for Pain. Max Daily Amount: 12 Tabs.  11/14/17   Dittoel Isaac Lomax MD   HYDROcodone-acetaminophen (NORCO) 5-325 mg per tablet Take 1 Tab by mouth every four (4) hours as needed for Pain. Max Daily Amount: 6 Tabs. 9/23/17   Derek Nolasco MD   ondansetron (ZOFRAN ODT) 4 mg disintegrating tablet Take 1 Tab by mouth every eight (8) hours as needed for Nausea. 8/31/17   Jovanni Bonilla MD   HYDROcodone-acetaminophen [facility]) 5-325 mg per tablet Take 1 Tab by mouth every four (4) hours as needed for Pain. Max Daily Amount: 6 Tabs. 8/31/17   Jovanni Bonilla MD   amLODIPine (NORVASC) 2.5 mg tablet Take 2.5 mg by mouth daily. Morro Padron MD   omeprazole (PRILOSEC) 20 mg capsule Take 20 mg by mouth daily. Provider, Historical   calcitRIOL (ROCALTROL) 0.5 mcg capsule Take 0.5 mcg by mouth daily. Provider, Historical   levETIRAcetam (KEPPRA) 750 mg tablet Take 750 mg by mouth two (2) times a day. Vito, MD Morro   lacosamide (VIMPAT) 50 mg tab tablet Take 50 mg by mouth two (2) times a day. Morro Padron MD   atorvastatin (LIPITOR) 20 mg tablet Take 20 mg by mouth daily. Morro Padron MD   warfarin (COUMADIN) 5 mg tablet Decrease coumadin dose to 2.5mg while taking these antibiotics. Recheck INR every 2 days until you finish the antibiotics. Patient taking differently: Take 3.5 mg by mouth six (6) days a week. Monday thru Saturday  Indications: 5 mg m-T-W-Th, S-S       Friday 2.5mg 9/9/15   Adriana Cee MD   clopidogrel (PLAVIX) 75 mg tablet Take  by mouth daily. Morro Padron MD   sevelamer carbonate (RENVELA) 800 mg Tab tab Take 2,400 mg by mouth three (3) times daily (with meals). Morro Padron MD   aspirin 81 mg chewable tablet Take 81 mg by mouth daily. Morro Padron MD     Allergies   Allergen Reactions    Shellfish Containing Products Swelling     Break out in rash, trouble breathing    Heparin Analogues Other (comments)     Positive history of HIT    Iodinated Contrast- Oral And Iv Dye Other (comments)     Because of renal disease.   No rash or hives per patient report 1/14/2012      Social History     Tobacco Use    Smoking status: Never Smoker    Smokeless tobacco: Never Used   Substance Use Topics    Alcohol use: No      Family History   Problem Relation Age of Onset    COPD Mother     Lung Disease Mother     Cancer Father         stomach    Cancer Maternal Grandmother      Review of Systems:  A 11 point review of system was performed today. Pertinent positives and negatives are mentioned in the HPI. The reminder of the ROS is negative and noncontributory. Objective:    Vitals:    Vitals:    11/17/18 0400 11/17/18 0410 11/17/18 0421 11/17/18 0728   BP: 115/75 115/75 114/73 110/65   Pulse:  99 91 74   Resp:  12 18 16   Temp:  99.7 °F (37.6 °C) 97.9 °F (36.6 °C) 98.5 °F (36.9 °C)   SpO2: 100% 100% 100% 100%   Weight:       Height:         I&O's:  11/16 0701 - 11/17 0700  In: 100 [I.V.:100]  Out: -     Physical Exam:  General:Alert, No distress,   Eyes:No scleral icterus, No conjunctival pallor  Neck:Supple,no mass palpable,no thyromegaly  Lungs:Clears to auscultation Bilaterally, normal respiratory effort  CVS:RRR, S1 S2 normal,  No rub,  Abdomen:Soft, Non tender, No hepatosplenomegaly  Extremities: no LE edema, right arm avf bruit + , no signs of infection  Skin:No rash or lesions, Warm and DRY   :  No oneal  Musculoskeletal : no joint pain, no joint tenderness  Psych: AAO X 3, appropriate affect   NEURO: non focal , normal speech   Dialysis Access:  Right groin permacath +, right arm avf +    Care Plan discussed with:  patient     Chart reviewed.   ECG[de-identified] Rev:no  Xray/CT/US/MRI REV:yes    Lab Data Personally Reviewed: (see below)    Recent Labs     11/17/18 0513 11/16/18 2129 11/16/18 2032   WBC 10.7  --  9.4   HGB 8.1*  --  8.2*   *  --  126*   ANEU 7.1  --  7.2   INR  --  4.8*  --    APTT  --  56.9*  --    *  --  132*   K 3.8  --  2.7*   GLU 77  --  111*   BUN 25*  --  24*   CREA 8.60*  --  7.45*   ALT  --   --  23   SGOT --   --  53*   TBILI  --   --  0.5   AP  --   --  105   CA 8.0*  --  8.4*   MG  --   --  1.8     Lab Results   Component Value Date/Time    Specimen Description: NARES 05/30/2013 05:53 PM    Specimen Description: URINE 11/16/2012 05:15 PM    Specimen Description: URINE 09/17/2012 01:35 AM       Lab Results   Component Value Date/Time    Iron 30 (L) 09/16/2014 04:17 AM    TIBC 250 09/16/2014 04:17 AM    Iron % saturation 12 (L) 09/16/2014 04:17 AM    Ferritin 547 (H) 01/17/2012 12:30 PM     Prior to Admission medications    Medication Sig Start Date End Date Taking? Authorizing Provider   oxyCODONE-acetaminophen (PERCOCET) 5-325 mg per tablet Take 1-2 Tabs by mouth every four (4) hours as needed for Pain. Max Daily Amount: 12 Tabs. 11/14/17   Lokesh Ritchie MD   HYDROcodone-acetaminophen [facility]) 5-325 mg per tablet Take 1 Tab by mouth every four (4) hours as needed for Pain. Max Daily Amount: 6 Tabs. 9/23/17   Chico Bolton MD   ondansetron (ZOFRAN ODT) 4 mg disintegrating tablet Take 1 Tab by mouth every eight (8) hours as needed for Nausea. 8/31/17   Hussein Page MD   HYDROcodone-acetaminophen [facility]) 5-325 mg per tablet Take 1 Tab by mouth every four (4) hours as needed for Pain. Max Daily Amount: 6 Tabs. 8/31/17   Hussein Page MD   amLODIPine (NORVASC) 2.5 mg tablet Take 2.5 mg by mouth daily. Morro Padron MD   omeprazole (PRILOSEC) 20 mg capsule Take 20 mg by mouth daily. Provider, Historical   calcitRIOL (ROCALTROL) 0.5 mcg capsule Take 0.5 mcg by mouth daily. Provider, Historical   levETIRAcetam (KEPPRA) 750 mg tablet Take 750 mg by mouth two (2) times a day. Morro Padron MD   lacosamide (VIMPAT) 50 mg tab tablet Take 50 mg by mouth two (2) times a day. Morro Padron MD   atorvastatin (LIPITOR) 20 mg tablet Take 20 mg by mouth daily. Morro Padron MD   warfarin (COUMADIN) 5 mg tablet Decrease coumadin dose to 2.5mg while taking these antibiotics.  Recheck INR every 2 days until you finish the antibiotics. Patient taking differently: Take 3.5 mg by mouth six (6) days a week. Monday thru Saturday  Indications: 5 mg m-T-W-Th, S-S       Friday 2.5mg 9/9/15   Tory Scheuermann, MD   clopidogrel (PLAVIX) 75 mg tablet Take  by mouth daily. Morro Padron MD   sevelamer carbonate (RENVELA) 800 mg Tab tab Take 2,400 mg by mouth three (3) times daily (with meals). Morro Padron MD   aspirin 81 mg chewable tablet Take 81 mg by mouth daily. Morro Padron MD     Medications list Personally Reviewed   [x]          Yes     []               No    Prior to Admission Medications   Prescriptions Last Dose Informant Patient Reported? Taking? HYDROcodone-acetaminophen (NORCO) 5-325 mg per tablet   No No   Sig: Take 1 Tab by mouth every four (4) hours as needed for Pain. Max Daily Amount: 6 Tabs. HYDROcodone-acetaminophen (NORCO) 5-325 mg per tablet   No No   Sig: Take 1 Tab by mouth every four (4) hours as needed for Pain. Max Daily Amount: 6 Tabs. amLODIPine (NORVASC) 2.5 mg tablet   Yes No   Sig: Take 2.5 mg by mouth daily. aspirin 81 mg chewable tablet   Yes No   Sig: Take 81 mg by mouth daily. atorvastatin (LIPITOR) 20 mg tablet   Yes No   Sig: Take 20 mg by mouth daily. calcitRIOL (ROCALTROL) 0.5 mcg capsule   Yes No   Sig: Take 0.5 mcg by mouth daily. clopidogrel (PLAVIX) 75 mg tablet   Yes No   Sig: Take  by mouth daily. lacosamide (VIMPAT) 50 mg tab tablet   Yes No   Sig: Take 50 mg by mouth two (2) times a day. levETIRAcetam (KEPPRA) 750 mg tablet   Yes No   Sig: Take 750 mg by mouth two (2) times a day. omeprazole (PRILOSEC) 20 mg capsule   Yes No   Sig: Take 20 mg by mouth daily. ondansetron (ZOFRAN ODT) 4 mg disintegrating tablet   No No   Sig: Take 1 Tab by mouth every eight (8) hours as needed for Nausea. oxyCODONE-acetaminophen (PERCOCET) 5-325 mg per tablet   No No   Sig: Take 1-2 Tabs by mouth every four (4) hours as needed for Pain.  Max Daily Amount: 12 Tabs.   sevelamer carbonate (RENVELA) 800 mg Tab tab   Yes No   Sig: Take 2,400 mg by mouth three (3) times daily (with meals). warfarin (COUMADIN) 5 mg tablet   No No   Sig: Decrease coumadin dose to 2.5mg while taking these antibiotics. Recheck INR every 2 days until you finish the antibiotics. Patient taking differently: Take 3.5 mg by mouth six (6) days a week. Monday thru Saturday  Indications: 5 mg m-T-W-Th, S-S Friday 2.5mg      Facility-Administered Medications: None       Thank you for allowing us to participate in the care this patient. We will follow patient with you. Magdalena Hennessy, [address] [place] [address] [facility]  [facility] TIA Jerez , [facility], [address] [facility]  Phone - [phone]         Fax - [phone] [facility]  [address] [place] [address]  Phone - [phone]        Fax - [phone]     www. [facility]

## 2018-11-18 LAB
ANION GAP SERPL CALC-SCNC: 15 MMOL/L (ref 5–15)
BASOPHILS # BLD: 0 K/UL (ref 0–0.1)
BASOPHILS NFR BLD: 0 % (ref 0–1)
BUN SERPL-MCNC: 38 MG/DL (ref 6–20)
BUN/CREAT SERPL: 3 (ref 12–20)
CALCIUM SERPL-MCNC: 9 MG/DL (ref 8.5–10.1)
CHLORIDE SERPL-SCNC: 101 MMOL/L (ref 97–108)
CO2 SERPL-SCNC: 20 MMOL/L (ref 21–32)
CREAT SERPL-MCNC: 10.9 MG/DL (ref 0.7–1.3)
DIFFERENTIAL METHOD BLD: ABNORMAL
EOSINOPHIL # BLD: 0.7 K/UL (ref 0–0.4)
EOSINOPHIL NFR BLD: 7 % (ref 0–7)
ERYTHROCYTE [DISTWIDTH] IN BLOOD BY AUTOMATED COUNT: 13.7 % (ref 11.5–14.5)
GLUCOSE SERPL-MCNC: 96 MG/DL (ref 65–100)
HCT VFR BLD AUTO: 23.2 % (ref 36.6–50.3)
HGB BLD-MCNC: 7.7 G/DL (ref 12.1–17)
IMM GRANULOCYTES # BLD: 0 K/UL (ref 0–0.04)
IMM GRANULOCYTES NFR BLD AUTO: 0 % (ref 0–0.5)
INR PPP: 3.1 (ref 0.9–1.1)
LYMPHOCYTES # BLD: 0.6 K/UL (ref 0.8–3.5)
LYMPHOCYTES NFR BLD: 6 % (ref 12–49)
MCH RBC QN AUTO: 31.8 PG (ref 26–34)
MCHC RBC AUTO-ENTMCNC: 33.2 G/DL (ref 30–36.5)
MCV RBC AUTO: 95.9 FL (ref 80–99)
MONOCYTES # BLD: 1.3 K/UL (ref 0–1)
MONOCYTES NFR BLD: 14 % (ref 5–13)
NEUTS SEG # BLD: 6.8 K/UL (ref 1.8–8)
NEUTS SEG NFR BLD: 72 % (ref 32–75)
NRBC # BLD: 0 K/UL (ref 0–0.01)
NRBC BLD-RTO: 0 PER 100 WBC
PLATELET # BLD AUTO: 122 K/UL (ref 150–400)
PMV BLD AUTO: 11.1 FL (ref 8.9–12.9)
POTASSIUM SERPL-SCNC: 3.5 MMOL/L (ref 3.5–5.1)
PROTHROMBIN TIME: 30.2 SEC (ref 9–11.1)
RBC # BLD AUTO: 2.42 M/UL (ref 4.1–5.7)
SODIUM SERPL-SCNC: 136 MMOL/L (ref 136–145)
WBC # BLD AUTO: 9.5 K/UL (ref 4.1–11.1)

## 2018-11-18 PROCEDURE — 36415 COLL VENOUS BLD VENIPUNCTURE: CPT

## 2018-11-18 PROCEDURE — 74011000258 HC RX REV CODE- 258: Performed by: INTERNAL MEDICINE

## 2018-11-18 PROCEDURE — 85610 PROTHROMBIN TIME: CPT

## 2018-11-18 PROCEDURE — 74011250636 HC RX REV CODE- 250/636: Performed by: INTERNAL MEDICINE

## 2018-11-18 PROCEDURE — 94760 N-INVAS EAR/PLS OXIMETRY 1: CPT

## 2018-11-18 PROCEDURE — 77030018719 HC DRSG PTCH ANTIMIC J&J -A

## 2018-11-18 PROCEDURE — 80048 BASIC METABOLIC PNL TOTAL CA: CPT

## 2018-11-18 PROCEDURE — 74011250636 HC RX REV CODE- 250/636: Performed by: EMERGENCY MEDICINE

## 2018-11-18 PROCEDURE — 74011250637 HC RX REV CODE- 250/637: Performed by: EMERGENCY MEDICINE

## 2018-11-18 PROCEDURE — 74011636637 HC RX REV CODE- 636/637: Performed by: EMERGENCY MEDICINE

## 2018-11-18 PROCEDURE — 65660000000 HC RM CCU STEPDOWN

## 2018-11-18 PROCEDURE — 74011250637 HC RX REV CODE- 250/637: Performed by: INTERNAL MEDICINE

## 2018-11-18 PROCEDURE — 85025 COMPLETE CBC W/AUTO DIFF WBC: CPT

## 2018-11-18 RX ORDER — DIPHENHYDRAMINE HYDROCHLORIDE 50 MG/ML
50 INJECTION, SOLUTION INTRAMUSCULAR; INTRAVENOUS
Status: COMPLETED | OUTPATIENT
Start: 2018-11-18 | End: 2018-11-19

## 2018-11-18 RX ADMIN — SEVELAMER CARBONATE 2400 MG: 800 TABLET, FILM COATED ORAL at 13:22

## 2018-11-18 RX ADMIN — HYDROMORPHONE HYDROCHLORIDE 0.5 MG: 2 INJECTION INTRAMUSCULAR; INTRAVENOUS; SUBCUTANEOUS at 15:27

## 2018-11-18 RX ADMIN — LACOSAMIDE 50 MG: 50 TABLET, FILM COATED ORAL at 17:23

## 2018-11-18 RX ADMIN — SEVELAMER CARBONATE 2400 MG: 800 TABLET, FILM COATED ORAL at 09:32

## 2018-11-18 RX ADMIN — PIPERACILLIN SODIUM,TAZOBACTAM SODIUM 3.38 G: 3; .375 INJECTION, POWDER, FOR SOLUTION INTRAVENOUS at 06:59

## 2018-11-18 RX ADMIN — LEVETIRACETAM 750 MG: 500 TABLET ORAL at 17:23

## 2018-11-18 RX ADMIN — LEVETIRACETAM 750 MG: 500 TABLET ORAL at 09:32

## 2018-11-18 RX ADMIN — PIPERACILLIN SODIUM,TAZOBACTAM SODIUM 3.38 G: 3; .375 INJECTION, POWDER, FOR SOLUTION INTRAVENOUS at 17:23

## 2018-11-18 RX ADMIN — PREDNISONE 50 MG: 20 TABLET ORAL at 20:15

## 2018-11-18 RX ADMIN — CALCITRIOL 0.5 MCG: 0.25 CAPSULE ORAL at 09:32

## 2018-11-18 RX ADMIN — ATORVASTATIN CALCIUM 20 MG: 20 TABLET, FILM COATED ORAL at 09:32

## 2018-11-18 RX ADMIN — HYDROMORPHONE HYDROCHLORIDE 0.5 MG: 2 INJECTION INTRAMUSCULAR; INTRAVENOUS; SUBCUTANEOUS at 00:49

## 2018-11-18 RX ADMIN — LACOSAMIDE 50 MG: 50 TABLET, FILM COATED ORAL at 09:32

## 2018-11-18 RX ADMIN — OXYCODONE AND ACETAMINOPHEN 1 TABLET: 5; 325 TABLET ORAL at 09:32

## 2018-11-18 RX ADMIN — PANTOPRAZOLE SODIUM 40 MG: 40 TABLET, DELAYED RELEASE ORAL at 09:32

## 2018-11-18 RX ADMIN — Medication 10 ML: at 06:59

## 2018-11-18 RX ADMIN — HYDROMORPHONE HYDROCHLORIDE 0.5 MG: 2 INJECTION INTRAMUSCULAR; INTRAVENOUS; SUBCUTANEOUS at 11:20

## 2018-11-18 RX ADMIN — HYDROMORPHONE HYDROCHLORIDE 0.5 MG: 2 INJECTION INTRAMUSCULAR; INTRAVENOUS; SUBCUTANEOUS at 07:03

## 2018-11-18 RX ADMIN — Medication 10 ML: at 15:28

## 2018-11-18 NOTE — PROGRESS NOTES
HD cath dressing changed multiple times during shift, saturated with drainage. 4984 Bedside and Verbal shift change report given to Av Barrientos (oncoming nurse) by Wilmer Mishra RN (offgoing nurse). Report included the following information SBAR, Kardex, Intake/Output, MAR and Cardiac Rhythm Sinus Tach.

## 2018-11-18 NOTE — CONSULTS
ID consult    NAME:  Antoinette Jack                      :   1975                       MRN:   [id number]   Date/Time:  2018 11:07 PM  Subjective:   REASON FOR CONSULT:   Line inf [place]      Antoinette Jack  is a 41 y.o. -American male with a history of end-stage renal disease on dialysis at home thru right femoral permacath. History of renal transplant x 2. History of coronary artery disease /AICD placement. Patient presents on  with fever after having dialysis done at home the previous day. Patient has an AV graft on his right arm placed a few months ago but it has not been functioning well and he has  undergone multiple thrombectomies and also has had a couple of vascular stents placed in that AV graft arm. On arrival to the emergency room he had a temperature of 102.7 his white count was 10.7 blood cultures were sent and he was started on IV antibiotics which was vancomycin and Zosyn. I am asked to see him for the same. Patient has a history of treated staph aureus bacteremia in 2017. Past Medical History:   Diagnosis Date    Abdominal hematoma     AICD (automatic cardioverter/defibrillator) present     CAD (coronary artery disease)     anterior MI s/p 2 stents      Chronic abdominal pain     Chronic kidney disease     ESRD; Dialysis dependent.  Home [facility] does labs    DVT (deep venous thrombosis) ([address]     DVT of popliteal vein ([address] 2011    not anticoagulated due to bleeding, IVC filter    Gallstone pancreatitis     Gastrointestinal disorder     acid reflux    Gastrointestinal disorder     peptic ulcer    Hemodialysis patient ([address]     High cholesterol     Hypertension     Kidney transplant     b/l kidneys ,     Nephrotic syndrome     Other ill-defined conditions(799.89)     kidny transplant x2,  on dialysis    Other ill-defined conditions(799.89)     home dialysis    Other ill-defined conditions(799.89)     hx recurrent left leg DVT    [facility]     Seizures ([facility] 2015    most recent- only had three in lifetime    Small bowel obstruction (HCC)     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    V-tach [facility])       Past Surgical History:   Procedure Laterality Date    CARDIAC SURG PROCEDURE UNLIST  2007    2 stents    HX APPENDECTOMY      HX CHOLECYSTECTOMY      HX OTHER SURGICAL      cholecystectomy    HX OTHER SURGICAL  11/2011    exploratory laparotomy    HX OTHER SURGICAL      fem-pop    HX OTHER SURGICAL  02/2013    right upper extremity fistula    HX PACEMAKER  6/2009    AICD    HX SMALL BOWEL RESECTION      HX TRANSPLANT  2001     Kidney    HX TRANSPLANT  1992    kidney    HX UROLOGICAL      2 kidney transplants    ME EDG US EXAM SURGICAL ALTER STOM DUODENUM/JEJUNUM  7/15/2011         ME ESOPHAGOGASTRODUODENOSCOPY TRANSORAL DIAGNOSTIC  5/24/2012         VASCULAR SURGERY PROCEDURE UNLIST  11/14/2017    Insertion AV graft right upper arm (bovine); ligation of AV fistula right arm      Social History     obacco Use    Smoking status: Never Smoker    Smokeless tobacco: Never Used   Substance and Sexual Activity    Alcohol use: No    Drug use: No    Sexual activity: Not on file   Other Topics Concern    Not on file   Social History Narrative    Not on file      Family History   Problem Relation Age of Onset    COPD Mother     Lung Disease Mother     Cancer Father         stomach    Cancer Maternal Grandmother      Allergies   Allergen Reactions    Shellfish Containing Products Swelling     Break out in rash, trouble breathing    Heparin Analogues Other (comments)     Positive history of HIT    Iodinated Contrast- Oral And Iv Dye Other (comments)     Because of renal disease.   No rash or hives per patient report 1/14/2012         Current Facility-Administered Medications   Medication Dose Route Frequency Provider Last Rate Last Dose    sodium chloride (NS) flush 5-10 mL  5-10 mL IntraVENous Q8H Grace Munroe MD   10 mL at 11/17/18 1431    sodium chloride (NS) flush 5-10 mL  5-10 mL IntraVENous PRN Grace Munroe MD        acetaminophen (TYLENOL) tablet 650 mg  650 mg Oral Q4H PRN Grace Munroe MD        [facility]ansetron [facility] injection 4 mg  4 mg IntraVENous Q6H PRN Grace Munroe MD        oxyCODONE-acetaminophen (PERCOCET) 5-325 mg per tablet 1 Tab  1 Tab Oral Q8H PRN Grace Munroe MD   1 Tab at 11/17/18 1259    piperacillin-tazobactam (ZOSYN) 3.375 g in 0.9% sodium chloride (MBP/ADV) 100 mL  3.375 g IntraVENous Q12H Mony Collins MD 25 mL/hr at 11/17/18 1610 3.375 g at 11/17/18 1610    epoetin emilie (EPOGEN;PROCRIT) 14,000 Units  14,000 Units SubCUTAneous Q TUE, THU & SAT Queen Julia MD   14,000 Units at 11/17/18 1608    bisacodyl (DULCOLAX) suppository 10 mg  10 mg Rectal ONCE Chrissy Jarrett MD        magnesium citrate solution 296 mL  296 mL Oral NOW Chrissy Jarrett MD        HYDROmorphone (DILAUDID) injection 0.5 mg  0.5 mg IntraVENous Q4H PRN Chrissy Jarrett MD   0.5 mg at 11/17/18 2045    naloxone (NARCAN) injection 0.1 mg  0.1 mg IntraVENous EVERY 2 MINUTES AS NEEDED Chrissy Jarrett MD        [START ON 11/18/2018] atorvastatin (LIPITOR) tablet 20 mg  20 mg Oral DAILY Chrissy Jarrett MD        [START ON 11/18/2018] calcitRIOL (ROCALTROL) capsule 0.5 mcg  0.5 mcg Oral DAILY Chrissy Jarrett MD        lacosamide (VIMPAT) tablet 50 mg  50 mg Oral BID Chrissy Jarrett MD   50 mg at 11/17/18 1701    levETIRAcetam (KEPPRA) tablet 750 mg  750 mg Oral BID Chrissy Jarrett MD   750 mg at 11/17/18 1701    [START ON 11/18/2018] pantoprazole (PROTONIX) tablet 40 mg  40 mg Oral DAILY Chrissy Jarrett MD        sevelamer carbonate (RENVELA) tab 2,400 mg  2,400 mg Oral TID WITH MEALS Ellen Schumacher MD   2,400 mg at 11/17/18 1701    vancomycin (VANCOCIN) 750 mg in 0.9% sodium chloride 250 mL ([id number])  750 mg IntraVENous DIALYSIS JOANA Valenzuela MD And    Vancomycin HD reminder note   Other DAILY Maral Gonzales MD   Stopped at 11/17/18 0900        REVIEW OF SYSTEMS:     Const:  fever, chills, negative weight loss  Eyes:  negative diplopia or visual changes, negative eye pain  ENT:  negative coryza, negative sore throat  Resp:  negative cough, hemoptysis, dyspnea  Cards: negative for chest pain, palpitations, lower extremity edema  : Does not make any urine  GI: some abdominal pain no diarrhea, bleeding, constipation  Skin:  negative for rash and pruritus  Heme: negative for easy bruising and gum/nose bleeding  MS: negative for myalgias, arthralgias, back pain and muscle weakness  Neurolo:negative for headaches, dizziness, vertigo, memory problems   Psych: negative for feelings of anxiety, depression         Objective:   VITALS:    Visit Vitals  /63 (BP 1 Location: Left arm, BP Patient Position: At rest)   Pulse 74   Temp 98.1 °F (36.7 °C)   Resp 16   Ht 5' 7\" (1.702 m)   Wt 149 lb 0.5 oz (67.6 kg)   SpO2 100%   BMI 23.34 kg/m²       PHYSICAL EXAM:   General:    Alert, cooperative, no distress, appears stated age. Head:   Normocephalic, without obvious abnormality, atraumatic. Eyes:   Conjunctivae clear, anicteric sclerae. Pupils are equal  ENT  Nares normal. No drainage or sinus tenderness. Lips, mucosa, and tongue normal.  No Thrush  Neck:  Supple, symmetrical,  no adenopathy, thyroid: non tender    no carotid bruit and no JVD. Back:    No CVA tenderness. Lungs:   Clear to auscultation bilaterally. No Wheezing or Rhonchi. No rales. Heart:   Regular rate and rhythm,  no murmur, rub or gallop. AICD site okay  Abdomen:   Soft,lap scars  Extremities:   atraumatic, no cyanosis. No edema.  No clubbing  Skin:     Dark discoloration of the face  Lymph: Cervical, supraclavicular normal.  Neurologic: Grossly non-focal  Rt femoral area IV cath -site clean  Rt AV graft  Not tender    Pertinent Labs  WBC 10.7  Hemoglobin 8.1  Platelet 318  Potassium 2.7  Blood culture pending    IMAGING RESULTS:  CT abdomen  Increased density of lower pole left renal cyst likely reflects interval hemorrhage into cyst which could indicate underlying neoplasm though this is not clearly seen. Impression/Recommendation    53-year-old male with a history of end-stage renal disease on dialysis through a permacath placed on the right femoral artery 3 weeks ago presents with fever after dialysis session which he does at home. Fever rule out bacteremia secondary to dialysis catheter. Blood cultures pending. Agree with vancomycin and Zosyn for now. Very likely the femoral catheter will have to be removed. Right arm AV graft does not appear to be infected. AICD site does not look infected but at risk for for infection if bacteremia present. No evidence of pneumonia or intra-abdominal infection. Renal transplant x2. Lasted 10 years. Now back on dialysis. Coronary artery disease with ischemic cardiomyopathy with low ejection fraction. Has AICD. Seizure disorder. On Keppra and Vimpat.     Discussed the management with the patient and  his nurse    ID team at [facility] will follow him on Monday

## 2018-11-18 NOTE — PROGRESS NOTES
Hospitalist Progress Note NAME: Beverley Shane :  1975 MRN:  [id number] Assessment / Plan: 
 
Sepsis secondary to infected HD femoral cath 
-cont telemetry contVanco and Zosyn Follow-up blood culture-p Vascular surgery appreciated 
-HD graft ok to use per vasc 
-remove fem line IF ok with renal 
-may new HD access if graft not working -appreciated ID and renal eval 
 
End-stage renal disease Nephrology consultation in the morning Continue hemodialysis as per nephrology, plans for HD monday History of DVT Hold coumadin INR supra theraputic on admit  
-INR 3.1 today 
-get CT abd/pelvis to reassess if progression of bleeding in the cyst, if stable will resume coumadin, pharm to daose 
  
Seizure We will continue home seizure medication Coronary artery disease We will continue home medication Lipitor 
-will resume Plavix and aspirin once INR <3 and if CT does not show progression of bleeding. Peripheral vascular disease on hold Plavix aspirin Abdominal pain/back pain Abdominal CT scan: Increased density of lower pole left renal cyst likely reflects 
interval hemorrhage into cyst which could indicate underlying neoplasm though 
this is not clearly seen. There are otherwise no acute intra-abdominal findings 
with incidental findings as above 
-hold asa, plavix,coumadin -appreciate urology eval and recs for renal protocol CT 
-will premed with solumedrol/benadryl for dye allergy and get CT today 
-plans to HD tomorrow 
-discussed plan for scan with renal yesterday 
  
  
Code Status: full code Surrogate Decision Maker: 
Vic Huge Sister  
  
  
  
DVT Prophylaxis: Coumadin GI Prophylaxis: not indicated 
  
Baseline: independent 18.5 - 24.9 Normal weight / Body mass index is 22.34 kg/m². Recommended Disposition: Home w/Family ?home health Subjective: Chief Complaint / Reason for Physician Visit 
fever Patient reports he has continued pain in his left flank and left lower quadrant. The pain medicine helps but then it comes back he does not feel that it is improved at all. He denies any nausea vomiting. He reports that some white milky fluid was noted when they changed the dressing of his right femoral catheter earlier today. He denies any shortness of breath or chest pain but feels puffy and swollen like he needs dialysis. We discussed getting his CAT scan today to evaluate the kidney lesion and worsening bleeding. He has tolerated premedication with Solu-Medrol and Benadryl in the past. 
 
Patient was evaluated at 3 PM 
 
 
Discussed with RN events overnight. Review of Systems: 
Symptom Y/N Comments  Symptom Y/N Comments Fever/Chills    Chest Pain n   
Poor Appetite n   Edema Cough    Abdominal Pain y Sputum    Joint Pain SOB/ZAMUDIO n   Pruritis/Rash Nausea/vomit n   Tolerating PT/OT Diarrhea    Tolerating Diet y Constipation    Other Could NOT obtain due to:   
 
Objective: VITALS:  
Last 24hrs VS reviewed since prior progress note. Most recent are: 
Patient Vitals for the past 24 hrs: 
 Temp Pulse Resp BP SpO2  
11/18/18 1523 98 °F (36.7 °C) 87 19 114/72 100 % 11/18/18 1037 98.3 °F (36.8 °C) 90 18 106/69   
11/18/18 0724 98 °F (36.7 °C) 89 16 121/83 100 % 11/18/18 0137 98.1 °F (36.7 °C) 97 18 99/63 98 % 11/17/18 2226 98.1 °F (36.7 °C) 74 16 111/63 100 % 11/17/18 1940 98 °F (36.7 °C) 75 16 109/69 99 % Intake/Output Summary (Last 24 hours) at 11/18/2018 1613 Last data filed at 11/18/2018 1533 Gross per 24 hour Intake 680 ml Output  Net 680 ml PHYSICAL EXAM: 
Patient is awake and alert oriented x3 sitting in chair no distress on room air. Cardiovascular regular rate with a systolic murmur no rubs or gallops. Lungs are clear no wheezes rhonchi or crackles.   Abdomen bowel sounds are present soft some tenderness in the left lower quadrant no rebound or guarding and tenderness in the left flank to palpation nothing along his spine no point tenderness. He has a right femoral catheter in place currently I do not see any obvious pus or erythema. Extremities-some tight edema no clubbing or cyanosis Reviewed most current lab test results and cultures  YES Reviewed most current radiology test results   YES Review and summation of old records today    NO Reviewed patient's current orders and MAR    YES 
PMH/SH reviewed - no change compared to H&P 
________________________________________________________________________ Care Plan discussed with: 
  Comments Patient y Family RN y   
Care Manager Consultant Multidiciplinary team rounds were held today with , nursing, pharmacist and clinical coordinator. Patient's plan of care was discussed; medications were reviewed and discharge planning was addressed. ________________________________________________________________________ Total NON critical care TIME:   Minutes Total CRITICAL CARE TIME Spent:   Minutes non procedure based Comments >50% of visit spent in counseling and coordination of care    
________________________________________________________________________ Sachin Duenas MD  
 
Procedures: see electronic medical records for all procedures/Xrays and details which were not copied into this note but were reviewed prior to creation of Plan. LABS: 
I reviewed today's most current labs and imaging studies. Pertinent labs include: 
Recent Labs 11/18/18 
0330 11/17/18 
0513 11/16/18 
2032 WBC 9.5 10.7 9.4 HGB 7.7* 8.1* 8.2* HCT 23.2* 24.3* 24.1*  
* 115* 126* Recent Labs 11/18/18 
0330 11/17/18 
0403 11/17/18 
0513 11/16/18 2129 11/16/18 2032   --  133*  --  132* K 3.5  --  3.8  --  2.7*  
  --  100  --  96* CO2 20*  --  20*  --  27  
GLU 96  --  77  --  111* BUN 38*  --  25*  --  24* CREA 10.90*  --  8.60*  --  7.45* CA 9.0  --  8.0*  --  8.4* MG  --   --   --   --  1.8 ALB  --   --   --   --  3.6 TBILI  --   --   --   --  0.5 SGOT  --   --   --   --  53* ALT  --   --   --   --  23 INR 3.1* 3.7*  --  4.8*  --   
 
 
Signed: Angela Anthony MD

## 2018-11-18 NOTE — PROGRESS NOTES
Called CT department and spoke with CT tech regarding CT order and pretreatment protocol for patients at risk for Anaphylactoid reactions to contrast media, CT tech stated \"it would be safer to perform test in the morning when more staff are available\", informed CT that patient had Solu-medrol 125 mg IV and Benadryl 50 mg IV in the past before CT; CT tech verified with radiologist that patient needed to follow standard pretreatment rather the ER pretreatment protocol. Called and informed Dr. Jonas Vital, telephone orders received to modify premedication treatment to Prednisone 50 mg PO 13,7, and 1 hour prior to exam time per protocol. Exam time set for 0900.

## 2018-11-18 NOTE — PROGRESS NOTES
ID 
Full consult note to follow Presents with fever ESRD on dialysis Rt femoral permacath- likely source On vanco and zosyn Await blood culture Rt femoral line very likely will have to be removed

## 2018-11-19 ENCOUNTER — APPOINTMENT (OUTPATIENT)
Dept: CT IMAGING | Age: 43
DRG: 314 | End: 2018-11-19
Attending: EMERGENCY MEDICINE
Payer: MEDICARE

## 2018-11-19 ENCOUNTER — APPOINTMENT (OUTPATIENT)
Dept: INTERVENTIONAL RADIOLOGY/VASCULAR | Age: 43
DRG: 314 | End: 2018-11-19
Attending: INTERNAL MEDICINE
Payer: MEDICARE

## 2018-11-19 LAB
ANION GAP SERPL CALC-SCNC: 16 MMOL/L (ref 5–15)
BASOPHILS # BLD: 0 K/UL (ref 0–0.1)
BASOPHILS NFR BLD: 0 % (ref 0–1)
BUN SERPL-MCNC: 46 MG/DL (ref 6–20)
BUN/CREAT SERPL: 3 (ref 12–20)
CALCIUM SERPL-MCNC: 8.8 MG/DL (ref 8.5–10.1)
CHLORIDE SERPL-SCNC: 97 MMOL/L (ref 97–108)
CO2 SERPL-SCNC: 20 MMOL/L (ref 21–32)
CREAT SERPL-MCNC: 13.8 MG/DL (ref 0.7–1.3)
DIFFERENTIAL METHOD BLD: ABNORMAL
EOSINOPHIL # BLD: 0.1 K/UL (ref 0–0.4)
EOSINOPHIL NFR BLD: 1 % (ref 0–7)
ERYTHROCYTE [DISTWIDTH] IN BLOOD BY AUTOMATED COUNT: 14.4 % (ref 11.5–14.5)
GLUCOSE SERPL-MCNC: 136 MG/DL (ref 65–100)
HCT VFR BLD AUTO: 25.6 % (ref 36.6–50.3)
HGB BLD-MCNC: 8.6 G/DL (ref 12.1–17)
IMM GRANULOCYTES # BLD: 0 K/UL (ref 0–0.04)
IMM GRANULOCYTES NFR BLD AUTO: 0 % (ref 0–0.5)
LYMPHOCYTES # BLD: 0.4 K/UL (ref 0.8–3.5)
LYMPHOCYTES NFR BLD: 6 % (ref 12–49)
MCH RBC QN AUTO: 32.2 PG (ref 26–34)
MCHC RBC AUTO-ENTMCNC: 33.6 G/DL (ref 30–36.5)
MCV RBC AUTO: 95.9 FL (ref 80–99)
MONOCYTES # BLD: 0 K/UL (ref 0–1)
MONOCYTES NFR BLD: 0 % (ref 5–13)
NEUTS SEG # BLD: 5.8 K/UL (ref 1.8–8)
NEUTS SEG NFR BLD: 93 % (ref 32–75)
NRBC # BLD: 0.02 K/UL (ref 0–0.01)
NRBC BLD-RTO: 0.3 PER 100 WBC
PLATELET # BLD AUTO: 139 K/UL (ref 150–400)
PMV BLD AUTO: 11.4 FL (ref 8.9–12.9)
POTASSIUM SERPL-SCNC: 4.2 MMOL/L (ref 3.5–5.1)
RBC # BLD AUTO: 2.67 M/UL (ref 4.1–5.7)
RBC MORPH BLD: ABNORMAL
SODIUM SERPL-SCNC: 133 MMOL/L (ref 136–145)
WBC # BLD AUTO: 6.3 K/UL (ref 4.1–11.1)

## 2018-11-19 PROCEDURE — 94760 N-INVAS EAR/PLS OXIMETRY 1: CPT

## 2018-11-19 PROCEDURE — 74011250637 HC RX REV CODE- 250/637: Performed by: EMERGENCY MEDICINE

## 2018-11-19 PROCEDURE — 36415 COLL VENOUS BLD VENIPUNCTURE: CPT

## 2018-11-19 PROCEDURE — 5A1D70Z PERFORMANCE OF URINARY FILTRATION, INTERMITTENT, LESS THAN 6 HOURS PER DAY: ICD-10-PCS | Performed by: EMERGENCY MEDICINE

## 2018-11-19 PROCEDURE — 74011636320 HC RX REV CODE- 636/320: Performed by: EMERGENCY MEDICINE

## 2018-11-19 PROCEDURE — 74011250637 HC RX REV CODE- 250/637: Performed by: INTERNAL MEDICINE

## 2018-11-19 PROCEDURE — 65660000000 HC RM CCU STEPDOWN

## 2018-11-19 PROCEDURE — 74011250636 HC RX REV CODE- 250/636: Performed by: EMERGENCY MEDICINE

## 2018-11-19 PROCEDURE — 77030018719 HC DRSG PTCH ANTIMIC J&J -A

## 2018-11-19 PROCEDURE — 74178 CT ABD&PLV WO CNTR FLWD CNTR: CPT

## 2018-11-19 PROCEDURE — 74011636637 HC RX REV CODE- 636/637: Performed by: EMERGENCY MEDICINE

## 2018-11-19 PROCEDURE — 74011000258 HC RX REV CODE- 258: Performed by: INTERNAL MEDICINE

## 2018-11-19 PROCEDURE — 85025 COMPLETE CBC W/AUTO DIFF WBC: CPT

## 2018-11-19 PROCEDURE — 90935 HEMODIALYSIS ONE EVALUATION: CPT

## 2018-11-19 PROCEDURE — 74011250636 HC RX REV CODE- 250/636: Performed by: INTERNAL MEDICINE

## 2018-11-19 PROCEDURE — 80048 BASIC METABOLIC PNL TOTAL CA: CPT

## 2018-11-19 PROCEDURE — 87040 BLOOD CULTURE FOR BACTERIA: CPT

## 2018-11-19 RX ORDER — WARFARIN 2.5 MG/1
2.5 TABLET ORAL
COMMUNITY
End: 2020-10-29 | Stop reason: CLARIF

## 2018-11-19 RX ORDER — SODIUM CHLORIDE 0.9 % (FLUSH) 0.9 %
10 SYRINGE (ML) INJECTION
Status: ACTIVE | OUTPATIENT
Start: 2018-11-19 | End: 2018-11-19

## 2018-11-19 RX ORDER — SEVELAMER CARBONATE 800 MG/1
1600 TABLET, FILM COATED ORAL
COMMUNITY
End: 2020-11-10

## 2018-11-19 RX ORDER — CALCITRIOL 0.5 UG/1
0.5 CAPSULE ORAL DAILY
Status: ON HOLD | COMMUNITY
End: 2018-11-19

## 2018-11-19 RX ORDER — WARFARIN SODIUM 5 MG/1
5 TABLET ORAL
COMMUNITY
End: 2020-10-29 | Stop reason: CLARIF

## 2018-11-19 RX ORDER — SODIUM CHLORIDE 9 MG/ML
50 INJECTION, SOLUTION INTRAVENOUS
Status: ACTIVE | OUTPATIENT
Start: 2018-11-19 | End: 2018-11-19

## 2018-11-19 RX ORDER — METOPROLOL SUCCINATE 25 MG/1
12.5 TABLET, EXTENDED RELEASE ORAL DAILY
COMMUNITY
End: 2020-10-29 | Stop reason: CLARIF

## 2018-11-19 RX ADMIN — ERYTHROPOIETIN 14000 UNITS: 10000 INJECTION, SOLUTION INTRAVENOUS; SUBCUTANEOUS at 17:00

## 2018-11-19 RX ADMIN — ATORVASTATIN CALCIUM 20 MG: 20 TABLET, FILM COATED ORAL at 09:29

## 2018-11-19 RX ADMIN — Medication 10 ML: at 18:29

## 2018-11-19 RX ADMIN — IOPAMIDOL 100 ML: 755 INJECTION, SOLUTION INTRAVENOUS at 09:04

## 2018-11-19 RX ADMIN — SEVELAMER CARBONATE 2400 MG: 800 TABLET, FILM COATED ORAL at 09:28

## 2018-11-19 RX ADMIN — CALCITRIOL 0.5 MCG: 0.25 CAPSULE ORAL at 09:28

## 2018-11-19 RX ADMIN — HYDROMORPHONE HYDROCHLORIDE 0.5 MG: 2 INJECTION INTRAMUSCULAR; INTRAVENOUS; SUBCUTANEOUS at 00:02

## 2018-11-19 RX ADMIN — OXYCODONE AND ACETAMINOPHEN 1 TABLET: 5; 325 TABLET ORAL at 11:29

## 2018-11-19 RX ADMIN — HYDROMORPHONE HYDROCHLORIDE 0.5 MG: 2 INJECTION INTRAMUSCULAR; INTRAVENOUS; SUBCUTANEOUS at 14:29

## 2018-11-19 RX ADMIN — PREDNISONE 50 MG: 20 TABLET ORAL at 07:59

## 2018-11-19 RX ADMIN — PREDNISONE 50 MG: 20 TABLET ORAL at 03:06

## 2018-11-19 RX ADMIN — HYDROMORPHONE HYDROCHLORIDE 0.5 MG: 2 INJECTION INTRAMUSCULAR; INTRAVENOUS; SUBCUTANEOUS at 10:16

## 2018-11-19 RX ADMIN — LACOSAMIDE 50 MG: 50 TABLET, FILM COATED ORAL at 17:06

## 2018-11-19 RX ADMIN — IRON SUCROSE 100 MG: 20 INJECTION, SOLUTION INTRAVENOUS at 13:14

## 2018-11-19 RX ADMIN — HYDROMORPHONE HYDROCHLORIDE 0.5 MG: 2 INJECTION INTRAMUSCULAR; INTRAVENOUS; SUBCUTANEOUS at 22:46

## 2018-11-19 RX ADMIN — PIPERACILLIN SODIUM,TAZOBACTAM SODIUM 3.38 G: 3; .375 INJECTION, POWDER, FOR SOLUTION INTRAVENOUS at 17:08

## 2018-11-19 RX ADMIN — LEVETIRACETAM 750 MG: 500 TABLET ORAL at 17:06

## 2018-11-19 RX ADMIN — DIPHENHYDRAMINE HYDROCHLORIDE 50 MG: 50 INJECTION, SOLUTION INTRAMUSCULAR; INTRAVENOUS at 08:49

## 2018-11-19 RX ADMIN — OXYCODONE AND ACETAMINOPHEN 1 TABLET: 5; 325 TABLET ORAL at 21:07

## 2018-11-19 RX ADMIN — HYDROMORPHONE HYDROCHLORIDE 0.5 MG: 2 INJECTION INTRAMUSCULAR; INTRAVENOUS; SUBCUTANEOUS at 04:26

## 2018-11-19 RX ADMIN — LEVETIRACETAM 750 MG: 500 TABLET ORAL at 09:29

## 2018-11-19 RX ADMIN — Medication 10 ML: at 00:02

## 2018-11-19 RX ADMIN — VANCOMYCIN HYDROCHLORIDE 750 MG: 750 INJECTION, POWDER, LYOPHILIZED, FOR SOLUTION INTRAVENOUS at 14:49

## 2018-11-19 RX ADMIN — HYDROMORPHONE HYDROCHLORIDE 0.5 MG: 2 INJECTION INTRAMUSCULAR; INTRAVENOUS; SUBCUTANEOUS at 18:29

## 2018-11-19 RX ADMIN — LACOSAMIDE 50 MG: 50 TABLET, FILM COATED ORAL at 09:29

## 2018-11-19 RX ADMIN — PANTOPRAZOLE SODIUM 40 MG: 40 TABLET, DELAYED RELEASE ORAL at 09:29

## 2018-11-19 RX ADMIN — Medication 10 ML: at 04:27

## 2018-11-19 RX ADMIN — Medication 5 ML: at 21:08

## 2018-11-19 RX ADMIN — SEVELAMER CARBONATE 2400 MG: 800 TABLET, FILM COATED ORAL at 17:06

## 2018-11-19 NOTE — PROGRESS NOTES
Patient visited by a Spiritual Care Partner Volunteer in [address] [place] [address] Unit on 11/19/2018.   
  
Documented by Rev. Christiana Gong, [facility]  paging service: [phone])

## 2018-11-19 NOTE — PROGRESS NOTES
Hospitalist Progress Note NAME: Sandie Aguilar :  1975 MRN:  [id number] Assessment / Plan: 
 
Sepsis secondary to infected HD femoral cath AICD 
-cont telemetry Erwinco and Zosyn Follow-up blood culture-neg so far Vascular surgery appreciated 
-HD graft ok to use per vasc 
-remove fem line IF ok with renal 
-may new HD access if graft not working -appreciated ID and renal eval 
-echo-p per ID, at least 2 weeks abx End-stage renal disease Nephrology consultation in the morning Continue hemodialysis as per nephrology, HD today via graft. Worked. History of DVT Hold coumadin INR supra theraputic on admit  
-INR 3.1 yesterday, repeat in am 
-s/p CT abd/pelvis : 
Hyperdense 2.6 x 3.0 cm left lower pole cyst demonstrating no 
appreciable enhancement through the various phases of contrast indicating that 
this is a hemorrhagic cyst rather than a neoplastic lesion to reassess if progression of bleeding in the cyst 
-left message for urology, to see if ok to resume antiplt/coumadin 
-will resume coumadin when ok with urology, pharm to dose 
  
Seizure We will continue home seizure medication Coronary artery disease AICD We will continue home medication Lipitor 
-will resume Plavix and aspirin once INR <3 and if ok with urology Peripheral vascular disease on hold Plavix aspirin Abdominal pain/back pain Abdominal CT scan: Increased density of lower pole left renal cyst likely reflects 
interval hemorrhage into cyst which could indicate underlying neoplasm though 
this is not clearly seen. There are otherwise no acute intra-abdominal findings 
with incidental findings as above 
-hold asa, plavix,coumadin -appreciate urology eval and recs for renal protocol CT 
-premed with solumedrol/benadryl for dye allergy today for CT today, did fine, followed by HD 
-CT confirms hemorraghic cyst, no neoplasm, await urology recs regarding resuming anticoag/antiplt 
  
   
Code Status: full code Surrogate Decision Maker: 
Jill Wynne Sister  
  
  
  
DVT Prophylaxis: Coumadin GI Prophylaxis: not indicated 
  
Baseline: independent 18.5 - 24.9 Normal weight / Body mass index is 23.62 kg/m². Recommended Disposition: Home w/Family ?home health Subjective: Chief Complaint / Reason for Physician Visit 
fever 11/18 Patient reports he has continued pain in his left flank and left lower quadrant. The pain medicine helps but then it comes back he does not feel that it is improved at all. He denies any nausea vomiting. He reports that some white milky fluid was noted when they changed the dressing of his right femoral catheter earlier today. He denies any shortness of breath or chest pain but feels puffy and swollen like he needs dialysis. We discussed getting his CAT scan today to evaluate the kidney lesion and worsening bleeding. He has tolerated premedication with Solu-Medrol and Benadryl in the past. 
 
11/19 pt still with  L flank pain, but pain meds help. Currently a 1. He does not see a difference. No n/v. No sob/cp. Had CT this am, without trouble. HD today via graft. No issues with graft. Patient was evaluated at 5:45PM 
 
 
Discussed with RN events overnight. Review of Systems: 
Symptom Y/N Comments  Symptom Y/N Comments Fever/Chills    Chest Pain n   
Poor Appetite n   Edema Cough    Abdominal Pain y Sputum    Joint Pain SOB/ZAMUDIO n   Pruritis/Rash Nausea/vomit n   Tolerating PT/OT Diarrhea    Tolerating Diet y Constipation    Other Could NOT obtain due to:   
 
Objective: VITALS:  
Last 24hrs VS reviewed since prior progress note. Most recent are: 
Patient Vitals for the past 24 hrs: 
 Temp Pulse Resp BP SpO2  
11/19/18 1550 97.8 °F (36.6 °C) 65 18 121/65   
11/19/18 1545  (!) 57 18 119/72   
11/19/18 1530  70 18 127/80   
11/19/18 1515  (!) 58 18 123/73   
11/19/18 1500  73 18 127/85  11/19/18 1445  72 18 132/79   
11/19/18 1430  71 18 124/75   
11/19/18 1415  71 18 124/83   
11/19/18 1400  75 18 115/84   
11/19/18 1345  78 18 129/78   
11/19/18 1330  66 18 116/73   
11/19/18 1315  73 18 117/80   
11/19/18 1300  73 18 126/82   
11/19/18 1248 98.3 °F (36.8 °C) 79 18 122/80   
11/19/18 1044 97.2 °F (36.2 °C) 85 18 118/76 100 % 11/19/18 0719 96.7 °F (35.9 °C) 82 16 119/81 100 % 11/19/18 0229 98.1 °F (36.7 °C) 90 16 121/77 99 % 11/18/18 2312 97.7 °F (36.5 °C) 86 18 104/64 98 % 11/18/18 1945 97.9 °F (36.6 °C) 81 16 109/73 99 % Intake/Output Summary (Last 24 hours) at 11/19/2018 1810 Last data filed at 11/19/2018 1708 Gross per 24 hour Intake 300 ml Output 2500 ml Net -2200 ml PHYSICAL EXAM: 
Patient is awake and alert oriented x3 sitting in chair no distress on room air. Cardiovascular regular rate with a systolic murmur no rubs or gallops. Lungs are clear no wheezes rhonchi or crackles. Abdomen bowel sounds are present soft some tenderness in the left lower quadrant no rebound or guarding and tenderness in the left flank to palpation   Extremities-some tight edema no clubbing or cyanosis Reviewed most current lab test results and cultures  YES Reviewed most current radiology test results   YES Review and summation of old records today    NO Reviewed patient's current orders and MAR    YES 
PMH/SH reviewed - no change compared to H&P 
________________________________________________________________________ Care Plan discussed with: 
  Comments Patient y Family RN y   
Care Manager Consultant  y Renal/ID Multidiciplinary team rounds were held today with , nursing, pharmacist and clinical coordinator. Patient's plan of care was discussed; medications were reviewed and discharge planning was addressed. ________________________________________________________________________ Total NON critical care TIME:   Minutes Total CRITICAL CARE TIME Spent:   Minutes non procedure based Comments >50% of visit spent in counseling and coordination of care    
________________________________________________________________________ Hayley Rutledge MD  
 
Procedures: see electronic medical records for all procedures/Xrays and details which were not copied into this note but were reviewed prior to creation of Plan. LABS: 
I reviewed today's most current labs and imaging studies. Pertinent labs include: 
Recent Labs 11/19/18 
1247 11/18/18 
0330 11/17/18 
5249 WBC 6.3 9.5 10.7 HGB 8.6* 7.7* 8.1* HCT 25.6* 23.2* 24.3*  
* 122* 115* Recent Labs 11/19/18 
1247 11/18/18 
0330 11/17/18 
1110 11/17/18 
0513 11/16/18 
2129 11/16/18 
2032 * 136  --  133*  --  132* K 4.2 3.5  --  3.8  --  2.7*  
CL 97 101  --  100  --  96* CO2 20* 20*  --  20*  --  27 * 96  --  77  --  111* BUN 46* 38*  --  25*  --  24* CREA 13.80* 10.90*  --  8.60*  --  7.45* CA 8.8 9.0  --  8.0*  --  8.4* MG  --   --   --   --   --  1.8 ALB  --   --   --   --   --  3.6 TBILI  --   --   --   --   --  0.5 SGOT  --   --   --   --   --  53* ALT  --   --   --   --   --  23 INR  --  3.1* 3.7*  --  4.8*  --   
 
 
Signed: Hayley Rutledge MD

## 2018-11-19 NOTE — PROGRESS NOTES
Bedside and Verbal shift change report given to Renata Houston RN (oncoming nurse) by Alejandra Mckeon RN (offgoing nurse). Report included the following information SBAR, Kardex, Intake/Output, MAR and Recent Results.

## 2018-11-19 NOTE — DIALYSIS
[facility] Dialysis Team [place]  [phone] Vitals   Pre   Post   Assessment   Pre   Post    
Temp  Temp: 98.3 °F (36.8 °C) (11/19/18 1248)  97.8 LOC  A&Ox3 A&Ox3 HR   Pulse (Heart Rate): 79 (11/19/18 1248) 65 Lungs   clear clear B/P   BP: 122/80 (11/19/18 1248) 121/65 Cardiac   RRR RRR Resp   Resp Rate: 18 (11/19/18 1248) 18 Skin   Warm, dry and intact Warm, dry and intact Pain level  Pain Intensity 1: 2 (11/19/18 1048) 1 Edema  generalized generalized Orders: Duration:   Start:    1248 End:    1548 Total:   3 hours Dialyzer:   Dialyzer/Set Up Inspection: Zeb Handing (11/19/18 1248) K Bath:   Dialysate K (mEq/L): 3 (11/19/18 1248) Ca Bath:   Dialysate CA (mEq/L): 2.5 (11/19/18 1248) Na/Bicarb:   Dialysate NA (mEq/L): 140 (11/19/18 1248) Target Fluid Removal: Access Type & Location:   RUE AVF + b/t. Access cleaned with alcohol per policy and procedure. Cannulated with 15gx2 w/o difficulty. Labs Obtained/Reviewed Critical Results Called   Date when labs were drawn- 
Hgb-   
HGB Date Value Ref Range Status 11/19/2018 8.6 (L) 12.1 - 17.0 g/dL Final  
 
K-   
Potassium Date Value Ref Range Status 11/19/2018 4.2 3.5 - 5.1 mmol/L Final  
 
Ca-  
Calcium Date Value Ref Range Status 11/19/2018 8.8 8.5 - 10.1 MG/DL Final  
 
Bun-  
BUN Date Value Ref Range Status 11/19/2018 46 (H) 6 - 20 MG/DL Final  
 
Creat-  
Creatinine Date Value Ref Range Status 11/19/2018 13.80 (H) 0.70 - 1.30 MG/DL Final  
 
  
Medications/ Blood Products Given Name   Dose   Route and Time Vancomycin 750mg IV @ 2549 Venofer 100mg  IV @1314 Blood Volume Processed (BVP):    66.1 Net Fluid Removed:  2500mL Comments All dialysis related medications have been reviewed. Assessment performed by RN. Procedure and documentation observed and reviewed by Sami Kidd RN. Time Out Done: 4838 Primary Nurse Rpt Pre:  Fior Mccain RN 
 Primary Nurse Rpt Post: 
Pt Education: procedural 
Care Plan: on going Tx Summary: 
6775:  SBAR received from Primary RN. Pt arrived to HD suite A&Ox3, denies complaints. Pt has RUE AVF + b/t. Access cleaned with alcohol per policy and procedure. Cannulated with 15gx2 w/o difficulty. VSS, tx initiated. 1300:  Pt resting 1315:  Pt resting; Venofer given 1330:  Pt resting 1345:  Pt resting 1400:  Pt resting 1415:  Pt resting 1430:  Pt alert, Primary RN at bedside to give pain meds 1445:  Pt resting 
1500:  Pt alert, pharmacy at bedside 1515:  Pt resting 1530:  Pt resting 1545:  Pt resting 1550: Tx terminated. All possible blood returned to pt w/o difficuty. Needles pulled, manual pressure held for approx 4 min/site. SBAR called to Primary RN. VSS, pt returned to room. Admiting Diagnosis:  Sepsis Pt's previous clinic- [facility] (home therapies) Consent signed - Informed Consent Verified: Yes (11/19/18 1248) Winter Consent - signed and on file Hepatitis Status- neg 2/5/18 >1000 Machine #- Machine Number: [id number] (11/19/18 1248) Telemetry status- 
Pre-dialysis wt. -

## 2018-11-19 NOTE — PROGRESS NOTES
Pt admitted with septic femoral dialysis catheter. Pt lives with sister, has Medicare//Medicaid, full code, independent with adls, PCP Dr Hetal Hutchison, does PD with Dr Carolina Shone, Denise Jean Baptiste, M, T, T, F, and Saturday and may benefit from [facility] if needed. Care Management Interventions PCP Verified by CM: Yes Transition of Care Consult (CM Consult): Discharge Planning MyChart Signup: No 
Discharge Durable Medical Equipment: No 
Health Maintenance Reviewed: Yes Physical Therapy Consult: No 
Occupational Therapy Consult: No 
Speech Therapy Consult: No 
Current Support Network: Lives with Caregiver, Family Lives [place] Confirm Follow Up Transport: Family Plan discussed with Pt/Family/Caregiver: Yes Freedom of Choice Offered: Yes Discharge Location Discharge Placement: Home 
'

## 2018-11-19 NOTE — PROGRESS NOTES
Bedside and Verbal shift change report given to Denisse 2 (oncoming nurse) by Renata Houston RN (offgoing nurse). Report included the following information SBAR, Kardex, Intake/Output, MAR and Cardiac Rhythm Normal Sinus RN.

## 2018-11-19 NOTE — PROGRESS NOTES
Nephrology Progress Note Blaise Leblanc  
 
[email or web address]                  Phone - [phone] Patient: Inga Triana YOB: 1975     Admit Date: 11/16/2018 Date- 11/19/2018 CC: Follow up for esrd Subjective: Interval History:  
-  No fever Blood cultures negative S/p ct abdo this am 
No c/o sob, chest pain, No c/o nausea or vomiting No c/o  fever. ROS:- as above Assessment:  
· End Stage Renal Disease - 
· Anemia of CKD · Secondary hyperparathyroidism of renal origin · Sepsis · H/o hypertension · Left renal cyst with hemorrhage · Plan: · Dialysis today · Use right arm av graft for hd · Remove permacath if av graft works okay · Follow CT ABDO results - urology input noted · Check INR and resume coumadin. · epogen mwf · Continue calcitriol · Continue renvela · venofer today Physical Exam:  
GEN: NAD NECK- Supple, no thyromegaly RESP: Clear b/l, no wheezing, No accessory muscle use CVS: RRR,S1,S2 SKIN: No Rash, ABDO: soft , non tender, No hepatosplenomegaly EXT: No Edema NEURO: non focal, normal speech Right fem permacath + Right arm av graft + Care Plan discussed with: pt , ,  Objective:  
Patient Vitals for the past 24 hrs: 
 Temp Pulse Resp BP SpO2  
11/19/18 0719 96.7 °F (35.9 °C) 82 16 119/81 100 % 11/19/18 0229 98.1 °F (36.7 °C) 90 16 121/77 99 % 11/18/18 2312 97.7 °F (36.5 °C) 86 18 104/64 98 % 11/18/18 1945 97.9 °F (36.6 °C) 81 16 109/73 99 % 11/18/18 1523 98 °F (36.7 °C) 87 19 114/72 100 % Last 3 Recorded Weights in this Encounter 11/16/18 2012 11/18/18 3079 11/19/18 4503 Weight: 67.6 kg (149 lb 0.5 oz) 64.7 kg (142 lb 10.2 oz) 68.4 kg (150 lb 12.7 oz)  
 
11/17 1901 - 11/19 0700 In: 680 [P.O.:480; I.V.:200] Out: 0 Chart reviewed. Pertinent Notes reviewed. Medication list  reviewed Current Facility-Administered Medications Medication  sodium chloride (NS) flush 10 mL  0.9% sodium chloride infusion  sodium chloride (NS) flush 5-10 mL  sodium chloride (NS) flush 5-10 mL  acetaminophen (TYLENOL) tablet 650 mg  
 ondansetron (ZOFRAN) injection 4 mg  oxyCODONE-acetaminophen (PERCOCET) 5-325 mg per tablet 1 Tab  piperacillin-tazobactam (ZOSYN) 3.375 g in 0.9% sodium chloride (MBP/ADV) 100 mL  epoetin emilie (EPOGEN;PROCRIT) 14,000 Units  HYDROmorphone (DILAUDID) injection 0.5 mg  
 naloxone (NARCAN) injection 0.1 mg  
 atorvastatin (LIPITOR) tablet 20 mg  
 calcitRIOL (ROCALTROL) capsule 0.5 mcg  lacosamide (VIMPAT) tablet 50 mg  levETIRAcetam (KEPPRA) tablet 750 mg  
 pantoprazole (PROTONIX) tablet 40 mg  
 sevelamer carbonate (RENVELA) tab 2,400 mg  
 vancomycin (VANCOCIN) 750 mg in 0.9% sodium chloride 250 mL ([id number]) And  Vancomycin HD reminder note Data Review : 
Recent Labs 11/18/18 
0330 11/17/18 
9665 11/17/18 
0513 11/16/18 2129 11/16/18 2032 WBC 9.5  --  10.7  --  9.4 HGB 7.7*  --  8.1*  --  8.2*  
*  --  115*  --  126* ANEU 6.8  --  7.1  --  7.2 INR 3.1* 3.7*  --  4.8*  --   
APTT  --   --   --  56.9*  --   
  --  133*  --  132* K 3.5  --  3.8  --  2.7*  
GLU 96  --  77  --  111* BUN 38*  --  25*  --  24* CREA 10.90*  --  8.60*  --  7.45* ALT  --   --   --   --  23 SGOT  --   --   --   --  53* TBILI  --   --   --   --  0.5 AP  --   --   --   --  105 CA 9.0  --  8.0*  --  8.4* MG  --   --   --   --  1.8 Lab Results Component Value Date/Time Culture result: NO GROWTH 3 DAYS 11/16/2018 08:32 PM  
 Culture result: NO GROWTH 5 DAYS 06/03/2017 05:16 AM  
 Culture result: (A) 06/01/2017 09:46 AM  
  STAPHYLOCOCCUS AUREUS 
ISOLATED FROM ALL 4 BOTTLES DRAWN. ..LAC AND LAC 1010 SITES  Culture result: (A) 06/01/2017 09:46 AM  
  PRELIMINARY REPORT OF 
GRAM POSITIVE COCCI 
IN CLUSTERS 
GROWING IN 2 OF 4 BOTTLES DRAWN 
 CALLED TO AND READ BACK BY 
MS ALFREDA ADEN RN ON 6/1/17 AT [address] [facility], [facility] 3119). MS 
  
 
Lab Results Component Value Date/Time Specimen Description: NARES 05/30/2013 05:53 PM  
 Specimen Description: URINE 11/16/2012 05:15 PM  
 Specimen Description: URINE 09/17/2012 01:35 AM  
 
No results for input(s): FE, TIBC, PSAT, FERR in the last 72 hours. Lab Results Component Value Date/Time Sodium,urine random 115 07/14/2011 07:20 PM  
 Creatinine, urine 52.2 07/14/2011 07:20 PM  
 
 
 
 
Bassam Beltre MD 
[facility] 
 [email or web address] [place] [facility] [employer] [address] [place], [address] Phone - [phone] Fax - [phone]

## 2018-11-19 NOTE — PROGRESS NOTES
Surgery No fever last 24 hours. History reviewed. On exam today, the patiej is alert and inno distress. Good thrill in AV graft right arm. No erythema or tnederness noted, no aneurysmal areas. Right femorla tunneled dialysis catheter inspected. No erythema or tenderness. WBC has been normal since admission. INR 3.1 yesterday. For hemodialysis with ABV graft today. For renal protocol CT today. I will discuss removal of dialysis catheter with Interventional Radiology. I will discuss prior imaging of AV graft and central veins with [address] [place] [address] outpatient imaging center.  
 
 
Radha Mahmood MD

## 2018-11-19 NOTE — PROGRESS NOTES
Pharmacy Medication Reconciliation The patient was interviewed regarding current PTA medication list, use and drug allergies; Patient present in room and obtained permission from patient to discuss drug regimen with visitor(s) present. The patient was questioned regarding use of any other inhalers, topical products, over the counter medications, herbal medications, vitamin products or ophthalmic/nasal/otic medication use. Allergy Update: No updates Recommendations/Findings: The following amendments were made to the patient's active medication list on file at [facility]:  
1) Additions: metoprolol 12.5 mg daily 2) Deletions: Clopidogrel 3) Changes: Warfarin dosing, Sevelamer dosing 
 
 
-Clarified PTA med list with Patient, fill history. PTA medication list was corrected to the following:  
 
Prior to Admission Medications Prescriptions Last Dose Informant Patient Reported? Taking? HYDROcodone-acetaminophen (NORCO) 5-325 mg per tablet Unknown at Unknown time  No No  
Sig: Take 1 Tab by mouth every four (4) hours as needed for Pain. Max Daily Amount: 6 Tabs. amLODIPine (NORVASC) 2.5 mg tablet 11/12/2018 at Unknown time  Yes Yes Sig: Take 2.5 mg by mouth daily. aspirin 81 mg chewable tablet 11/12/2018 at Unknown time  Yes Yes Sig: Take 81 mg by mouth daily. atorvastatin (LIPITOR) 20 mg tablet 11/12/2018 at Unknown time  Yes Yes Sig: Take 20 mg by mouth daily. calcitRIOL (ROCALTROL) 0.5 mcg capsule 11/12/2018 at Unknown time  Yes Yes Sig: Take 0.5 mcg by mouth daily. lacosamide (VIMPAT) 50 mg tab tablet 11/12/2018 at Unknown time  Yes Yes Sig: Take 50 mg by mouth two (2) times a day. levETIRAcetam (KEPPRA) 750 mg tablet 11/12/2018 at Unknown time  Yes Yes Sig: Take 750 mg by mouth two (2) times a day. metoprolol succinate (TOPROL-XL) 25 mg XL tablet 11/12/2018 at Unknown time  Yes Yes Sig: Take 12.5 mg by mouth daily. omeprazole (PRILOSEC) 20 mg capsule 11/12/2018 at Unknown time  Yes Yes Sig: Take 20 mg by mouth daily. ondansetron (ZOFRAN ODT) 4 mg disintegrating tablet 11/12/2018 at Unknown time  No Yes Sig: Take 1 Tab by mouth every eight (8) hours as needed for Nausea. oxyCODONE-acetaminophen (PERCOCET) 5-325 mg per tablet Unknown at Unknown time  No No  
Sig: Take 1-2 Tabs by mouth every four (4) hours as needed for Pain. Max Daily Amount: 12 Tabs. sevelamer carbonate (RENVELA) 800 mg tab tab   Yes Yes Sig: Take 1,600 mg by mouth three (3) times daily (with meals). Only takes if eating a  meal  
warfarin (COUMADIN) 2.5 mg tablet   Yes Yes Sig: Take 2.5 mg by mouth every Tuesday and Thursday. warfarin (COUMADIN) 5 mg tablet   Yes Yes Sig: Take 5 mg by mouth five (5) days a week. All days except Tuesday and Thursday, patient takes 2.5 mg Facility-Administered Medications: None Thank you, Eugenio Franz, PHARMD

## 2018-11-19 NOTE — PROGRESS NOTES
CT shows Hyperdense 2.6 x 3.0 cm left lower pole cyst demonstrating no 
appreciable enhancement through the various phases of contrast indicating that 
this is a hemorrhagic cyst rather than a neoplastic lesion. No further w/u indicated.   Reconsult prn

## 2018-11-19 NOTE — PROGRESS NOTES
Surgery Patient discussed with Dr Cachorro Vasquez. Patient will have HD today with AV graft. If all functioning well, plan removal of Permcath tomorrow. INR pending for today.  
 
Timoteo Gray MD

## 2018-11-19 NOTE — PROGRESS NOTES
15:05- Received report from Pranav off going RN. Pt off floor at this time in dialysis unit for treatment. 16:00- Received report from dialysis RN 
 
16:20- Pt back on unit

## 2018-11-19 NOTE — PROGRESS NOTES
Went to give patient 0500 dose Zosyn and both IVs were infiltrated. Patient is a hard stick and will see if vascular access team can place ultrasound guided IV before CT scan in the morning. MD ordoñez

## 2018-11-19 NOTE — PROGRESS NOTES
Infectious Disease Progress Note IMPRESSION:  
1. Sepsis , s/p fever , tachycardia temp on admission 102.7 2. Infected R/ femoral line with discharge around line ( WC not taken ) 3. BC no growth 4. ESRD on HD , h/o failed renal transplant x 2 ,  Functioning AV fistula + 5. Left lower pole lesion hemorrhagic rather than neoplastic on Ct abd/pelvis 6. Seizure disorder PLAN:  
1. Continue Vancomycin / Zosyn 2. Repeat BC now , await final cultures 3. ECHO cardiogram  
4. Plan for 2 weeks of antimicrobial therapy CT abdomen / pelvis IMPRESSION: Hyperdense 2.6 x 3.0 cm left lower pole cyst demonstrating no 
appreciable enhancement through the various phases of contrast indicating that 
this is a hemorrhagic cyst rather than a neoplastic lesion. Subjective:  
 
Pt seen in HD . Feels well. Doing work on laptop Review of Systems:  A comprehensive review of systems was negative. Objective:  
 
Blood pressure 121/65, pulse 65, temperature 97.8 °F (36.6 °C), temperature source Oral, resp. rate 18, height 5' 7\" (1.702 m), weight 150 lb 12.7 oz (68.4 kg), SpO2 100 %. Temp (24hrs), Av.7 °F (36.5 °C), Min:96.7 °F (35.9 °C), Max:98.3 °F (36.8 °C) Patient Vitals for the past 24 hrs: 
 Temp Pulse Resp BP SpO2  
18 1550 97.8 °F (36.6 °C) 65 18 121/65   
18 1545  (!) 57 18 119/72   
18 1530  70 18 127/80   
18 1515  (!) 58 18 123/73   
18 1500  73 18 127/85   
18 1445  72 18 132/79   
18 1430  71 18 124/75   
18 1415  71 18 124/83   
18 1400  75 18 115/84   
18 1345  78 18 129/78   
18 1330  66 18 116/73   
18 1315  73 18 117/80   
18 1300  73 18 126/82   
18 1248 98.3 °F (36.8 °C) 79 18 122/80   
18 1044 97.2 °F (36.2 °C) 85 18 118/76 100 % 18 0719 96.7 °F (35.9 °C) 82 16 119/81 100 % 18 0229 98.1 °F (36.7 °C) 90 16 121/77 99 % 11/18/18 2312 97.7 °F (36.5 °C) 86 18 104/64 98 % 11/18/18 1945 97.9 °F (36.6 °C) 81 16 109/73 99 % Lines:  Peripheral IV:    and AVF Physical Exam:  
General:  Alert, cooperative, Eyes:  Sclera anicteric. Pupils equally round and reactive to light. Mouth/Throat: Mucous membranes normal, oral pharynx clear Neck: Supple Lungs:    Reduced auscultation  bases CV:  Regular rate and rhythm,no murmur, click, rub or gallop Abdomen:   Soft, non-tender. bowel sounds normal. non-distended Extremities: No  Edema, R/femoral line examined , clean at this time Lymph nodes: Cervical and supraclavicular normal  
Lines/Devices:  Intact, no erythema, drainage or tenderness Data Review: CBC:  
Recent Labs 11/19/18 
1247 11/18/18 
0330 11/17/18 
8949 WBC 6.3 9.5 10.7 RBC 2.67* 2.42* 2.54* HGB 8.6* 7.7* 8.1* HCT 25.6* 23.2* 24.3*  
* 122* 115* GRANS 93* 72 67 LYMPH 6* 6* 11* EOS 1 7 6 CMP:  
Recent Labs 11/19/18 
1247 11/18/18 
0330 11/17/18 
0513 11/16/18 2032 * 96 77 111* * 136 133* 132* K 4.2 3.5 3.8 2.7*  
CL 97 101 100 96* CO2 20* 20* 20* 27 BUN 46* 38* 25* 24* CREA 13.80* 10.90* 8.60* 7.45* CA 8.8 9.0 8.0* 8.4* AGAP 16* 15 13 9 BUCR 3* 3* 3* 3* AP  --   --   --  105 TP  --   --   --  7.7 ALB  --   --   --  3.6 GLOB  --   --   --  4.1* AGRAT  --   --   --  0.9* Studies:     
Lab Results Component Value Date/Time Culture result: NO GROWTH 3 DAYS 11/16/2018 08:32 PM  
 Culture result: NO GROWTH 5 DAYS 06/03/2017 05:16 AM  
 Culture result: (A) 06/01/2017 09:46 AM  
  STAPHYLOCOCCUS AUREUS 
ISOLATED FROM ALL 4 BOTTLES DRAWN. ..LAC AND LAC 1010 SITES Culture result: (A) 06/01/2017 09:46 AM  
  PRELIMINARY REPORT OF 
GRAM POSITIVE COCCI 
IN CLUSTERS 
GROWING IN 2 OF 4 BOTTLES DRAWN 
CALLED TO AND READ BACK BY 
MS ALFREDA ADEN RN ON 6/1/17 AT [address] [place] [address], [facility] 4009).  MS 
  
 Culture result: MRSA NOT PRESENT 06/10/2016 12:46 PM  
 Culture result:  06/10/2016 12:46 PM  
      Screening of patient nares for MRSA is for surveillance purposes and, if positive, to facilitate isolation considerations in high risk settings. It is not intended for automatic decolonization interventions per se as regimens are not sufficiently effective to warrant routine use. XR Results (most recent): 
Results from Hospital Encounter encounter on 11/16/18 XR CHEST PORT Narrative INDICATION:  acute fever, vascular access infection COMPARISON: 6/1/2017 FINDINGS: Single AP portable view of the chest obtained at 2048 demonstrates a 
stable cardiomediastinal silhouette. The lungs are clear bilaterally. No osseous 
abnormalities are seen. There is a left-sided pacemaker device. Right arm 
vascular stents are noted. Impression IMPRESSION: No evidence of acute cardiopulmonary process. Patient Active Problem List  
Diagnosis Code  Kidney transplant  Coronary atherosclerosis of native coronary artery I25.10  Nausea & vomiting R11.2  Serum total bilirubin elevated R17  Abdominal pain R10.9  
 HTN (hypertension) I10  
 Anemia D64.9  
 S/P appendectomy Z90.49  
 High cholesterol E78.00  
 Other ill-defined conditions(799.89) R69  
 CAD (coronary artery disease) I25.10  Gastrointestinal disorder K92.9  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[facility] D69.6  Peritonitis ([facility] K65.9  Malnutrition ([facility] E46  
 DVT (deep venous thrombosis) ([facility]) I82.409  S/P IVC filter [id number]  Arterial occlusion I70.90  
 S/p cadaver renal transplant Z94.0  AICD (automatic cardioverter/defibrillator) present Z95.810  
 ESRD (end stage renal disease) on dialysis ([facility]) N18.6, Z99.2  Dialysis patient ([facility] Z99.2  Hyperkalemia E87.5  Congestive heart failure, unspecified I50.9  Hypoglycemia E16.2  Sepsis ([facility] A41.9 ICD-10-CM ICD-9-CM 1. Sepsis, due to unspecified organism ([facility] A41.9 038.9   
  995.91   
2. ESRD (end stage renal disease) on dialysis ([facility]) N18.6 585.6 Z99.2 V45.11   
3. Hypotension, unspecified hypotension type I95.9 458.9 4. Acute hypokalemia E87.6 276.8 5. Supratherapeutic INR R79.1 790.92 I have discussed the diagnosis with the patient and the intended plan as seen in the above orders. I have discussed medication side effects and warnings with the patient as well. Reviewed test results at length with patient Anti-infectives:  
 
 Vancomycin IV - 11/17 Zosyn IV - 11/17  Nayana Flowers MD FACP

## 2018-11-19 NOTE — PROGRESS NOTES
Blaise Leblanc  
 
 
 
NAME:Claude Lester Copa  MLD:[id number]   :1975 D/w dialysis nurse No issues with right arm avg We will remove permacath. Consult LUIZ Rosales MD 
[facility] [address] [place], [address] [facility] Phone - [phone] Fax - [phone] [address] [facility] Phone - [phone] Fax - [phone]    
www. [facility]

## 2018-11-19 NOTE — PROGRESS NOTES
Problem: Falls - Risk of 
Goal: *Absence of Falls Document Megan Horn Fall Risk and appropriate interventions in the flowsheet. Outcome: Progressing Towards Goal 
Fall Risk Interventions: 
  
 
  
 
Medication Interventions: Teach patient to arise slowly

## 2018-11-20 ENCOUNTER — APPOINTMENT (OUTPATIENT)
Dept: INTERVENTIONAL RADIOLOGY/VASCULAR | Age: 43
DRG: 314 | End: 2018-11-20
Attending: INTERNAL MEDICINE
Payer: MEDICARE

## 2018-11-20 LAB
ANION GAP SERPL CALC-SCNC: 11 MMOL/L (ref 5–15)
BASOPHILS # BLD: 0 K/UL (ref 0–0.1)
BASOPHILS NFR BLD: 0 % (ref 0–1)
BUN SERPL-MCNC: 31 MG/DL (ref 6–20)
BUN/CREAT SERPL: 3 (ref 12–20)
CALCIUM SERPL-MCNC: 9.2 MG/DL (ref 8.5–10.1)
CHLORIDE SERPL-SCNC: 99 MMOL/L (ref 97–108)
CO2 SERPL-SCNC: 26 MMOL/L (ref 21–32)
CREAT SERPL-MCNC: 9.22 MG/DL (ref 0.7–1.3)
DIFFERENTIAL METHOD BLD: ABNORMAL
EOSINOPHIL # BLD: 0 K/UL (ref 0–0.4)
EOSINOPHIL NFR BLD: 0 % (ref 0–7)
ERYTHROCYTE [DISTWIDTH] IN BLOOD BY AUTOMATED COUNT: 14.7 % (ref 11.5–14.5)
GLUCOSE SERPL-MCNC: 108 MG/DL (ref 65–100)
HCT VFR BLD AUTO: 27.5 % (ref 36.6–50.3)
HGB BLD-MCNC: 9.1 G/DL (ref 12.1–17)
IMM GRANULOCYTES # BLD: 0 K/UL (ref 0–0.04)
IMM GRANULOCYTES NFR BLD AUTO: 0 % (ref 0–0.5)
INR PPP: 2.4 (ref 0.9–1.1)
LYMPHOCYTES # BLD: 0.6 K/UL (ref 0.8–3.5)
LYMPHOCYTES NFR BLD: 6 % (ref 12–49)
MAGNESIUM SERPL-MCNC: 2.5 MG/DL (ref 1.6–2.4)
MCH RBC QN AUTO: 31.8 PG (ref 26–34)
MCHC RBC AUTO-ENTMCNC: 33.1 G/DL (ref 30–36.5)
MCV RBC AUTO: 96.2 FL (ref 80–99)
MONOCYTES # BLD: 1.1 K/UL (ref 0–1)
MONOCYTES NFR BLD: 11 % (ref 5–13)
NEUTS SEG # BLD: 8.6 K/UL (ref 1.8–8)
NEUTS SEG NFR BLD: 83 % (ref 32–75)
NRBC # BLD: 0.05 K/UL (ref 0–0.01)
NRBC BLD-RTO: 0.5 PER 100 WBC
PLATELET # BLD AUTO: 176 K/UL (ref 150–400)
PMV BLD AUTO: 10.9 FL (ref 8.9–12.9)
POTASSIUM SERPL-SCNC: 4 MMOL/L (ref 3.5–5.1)
PROTHROMBIN TIME: 23.3 SEC (ref 9–11.1)
RBC # BLD AUTO: 2.86 M/UL (ref 4.1–5.7)
RBC MORPH BLD: ABNORMAL
SODIUM SERPL-SCNC: 136 MMOL/L (ref 136–145)
WBC # BLD AUTO: 10.3 K/UL (ref 4.1–11.1)

## 2018-11-20 PROCEDURE — 74011250636 HC RX REV CODE- 250/636: Performed by: INTERNAL MEDICINE

## 2018-11-20 PROCEDURE — 36589 REMOVAL TUNNELED CV CATH: CPT

## 2018-11-20 PROCEDURE — 74011000258 HC RX REV CODE- 258: Performed by: INTERNAL MEDICINE

## 2018-11-20 PROCEDURE — 85610 PROTHROMBIN TIME: CPT

## 2018-11-20 PROCEDURE — 65660000000 HC RM CCU STEPDOWN

## 2018-11-20 PROCEDURE — 0JPT0XZ REMOVAL OF TUNNELED VASCULAR ACCESS DEVICE FROM TRUNK SUBCUTANEOUS TISSUE AND FASCIA, OPEN APPROACH: ICD-10-PCS | Performed by: RADIOLOGY

## 2018-11-20 PROCEDURE — 74011250636 HC RX REV CODE- 250/636: Performed by: EMERGENCY MEDICINE

## 2018-11-20 PROCEDURE — 85025 COMPLETE CBC W/AUTO DIFF WBC: CPT

## 2018-11-20 PROCEDURE — 94760 N-INVAS EAR/PLS OXIMETRY 1: CPT

## 2018-11-20 PROCEDURE — 77030029065 HC DRSG HEMO QCLOT ZMED -B

## 2018-11-20 PROCEDURE — 36415 COLL VENOUS BLD VENIPUNCTURE: CPT

## 2018-11-20 PROCEDURE — 74011250637 HC RX REV CODE- 250/637: Performed by: EMERGENCY MEDICINE

## 2018-11-20 PROCEDURE — 80048 BASIC METABOLIC PNL TOTAL CA: CPT

## 2018-11-20 PROCEDURE — 83735 ASSAY OF MAGNESIUM: CPT

## 2018-11-20 RX ORDER — WARFARIN 7.5 MG/1
7.5 TABLET ORAL ONCE
Status: COMPLETED | OUTPATIENT
Start: 2018-11-20 | End: 2018-11-20

## 2018-11-20 RX ORDER — OXYCODONE AND ACETAMINOPHEN 5; 325 MG/1; MG/1
1 TABLET ORAL
Status: DISCONTINUED | OUTPATIENT
Start: 2018-11-20 | End: 2018-11-23 | Stop reason: HOSPADM

## 2018-11-20 RX ORDER — GUAIFENESIN 100 MG/5ML
81 LIQUID (ML) ORAL DAILY
Status: DISCONTINUED | OUTPATIENT
Start: 2018-11-21 | End: 2018-11-23 | Stop reason: HOSPADM

## 2018-11-20 RX ADMIN — OXYCODONE AND ACETAMINOPHEN 1 TABLET: 5; 325 TABLET ORAL at 23:32

## 2018-11-20 RX ADMIN — CALCITRIOL 0.5 MCG: 0.25 CAPSULE ORAL at 08:42

## 2018-11-20 RX ADMIN — HYDROMORPHONE HYDROCHLORIDE 0.5 MG: 2 INJECTION INTRAMUSCULAR; INTRAVENOUS; SUBCUTANEOUS at 04:04

## 2018-11-20 RX ADMIN — LEVETIRACETAM 750 MG: 500 TABLET ORAL at 08:42

## 2018-11-20 RX ADMIN — PIPERACILLIN SODIUM,TAZOBACTAM SODIUM 3.38 G: 3; .375 INJECTION, POWDER, FOR SOLUTION INTRAVENOUS at 04:05

## 2018-11-20 RX ADMIN — HYDROMORPHONE HYDROCHLORIDE 0.5 MG: 2 INJECTION INTRAMUSCULAR; INTRAVENOUS; SUBCUTANEOUS at 13:38

## 2018-11-20 RX ADMIN — LACOSAMIDE 50 MG: 50 TABLET, FILM COATED ORAL at 08:43

## 2018-11-20 RX ADMIN — ATORVASTATIN CALCIUM 20 MG: 20 TABLET, FILM COATED ORAL at 08:43

## 2018-11-20 RX ADMIN — HYDROMORPHONE HYDROCHLORIDE 0.5 MG: 2 INJECTION INTRAMUSCULAR; INTRAVENOUS; SUBCUTANEOUS at 08:44

## 2018-11-20 RX ADMIN — WARFARIN SODIUM 7.5 MG: 7.5 TABLET ORAL at 18:39

## 2018-11-20 RX ADMIN — Medication 10 ML: at 13:38

## 2018-11-20 RX ADMIN — SEVELAMER CARBONATE 2400 MG: 800 TABLET, FILM COATED ORAL at 13:40

## 2018-11-20 RX ADMIN — PIPERACILLIN SODIUM,TAZOBACTAM SODIUM 3.38 G: 3; .375 INJECTION, POWDER, FOR SOLUTION INTRAVENOUS at 17:26

## 2018-11-20 RX ADMIN — SEVELAMER CARBONATE 2400 MG: 800 TABLET, FILM COATED ORAL at 17:29

## 2018-11-20 RX ADMIN — LACOSAMIDE 50 MG: 50 TABLET, FILM COATED ORAL at 17:30

## 2018-11-20 RX ADMIN — LEVETIRACETAM 750 MG: 500 TABLET ORAL at 17:29

## 2018-11-20 RX ADMIN — Medication 5 ML: at 05:33

## 2018-11-20 RX ADMIN — SEVELAMER CARBONATE 2400 MG: 800 TABLET, FILM COATED ORAL at 08:42

## 2018-11-20 RX ADMIN — PANTOPRAZOLE SODIUM 40 MG: 40 TABLET, DELAYED RELEASE ORAL at 08:43

## 2018-11-20 RX ADMIN — OXYCODONE AND ACETAMINOPHEN 1 TABLET: 5; 325 TABLET ORAL at 17:30

## 2018-11-20 RX ADMIN — Medication 10 ML: at 18:16

## 2018-11-20 NOTE — PROGRESS NOTES
Hospitalist Progress Note NAME: May Bergman :  1975 MRN:  [id number] Assessment / Plan: 
 
Sepsis secondary to suspected infected HD femoral cath AICD 
-cont telemetry cont Vanco and Zosyn Follow-up blood culture-neg so far?? 
Vascular surgery appreciated 
-HD graft ok to use per vasc 
-removed fem line this am 
-may new HD access if graft not working -appreciated ID and renal eval 
-echo-p per ID, at least 2 weeks abx 
-will need home abx vs abx with HD at a center, CM consulted End-stage renal disease Nephrology consultation in the morning Continue hemodialysis as per nephrology, HD tomorrow via graft.  
-graft worked yesterday History of DVT Hold coumadin INR supra theraputic on admit  
-INR 2.4 today 
-s/p CT abd/pelvis : 
Hyperdense 2.6 x 3.0 cm left lower pole cyst demonstrating no 
appreciable enhancement through the various phases of contrast indicating that 
this is a hemorrhagic cyst rather than a neoplastic lesion to reassess if progression of bleeding in the cyst 
-dicussed with urology, ok to resume antiplt/coumadin 
-will resume coumadin today, pharm to dose 
 
  
Seizure We will continue home seizure medication Coronary artery disease AICD We will continue home medication Lipitor 
-resume aspirin today, ok with urology Peripheral vascular disease on hold Plavix aspirin Abdominal pain/back pain Abdominal CT scan on admit: 
 Increased density of lower pole left renal cyst likely reflects 
interval hemorrhage into cyst which could indicate underlying neoplasm though 
this is not clearly seen.  There are otherwise no acute intra-abdominal findings 
with incidental findings as above 
-ok to resume asa and coumadin per urology, he confirmed he was not on plavix at home.  
-appreciate urology eval and recs for renal protocol CT 
-premed with solumedrol/benadryl for dye allergy ,did fine with CT, followed by HD 
 -CT confirms hemorraghic cyst, no neoplasm 
-wean IV narcs, stop today and cont po percocet 
  
Code Status: full code Surrogate Decision Maker: 
Blanca Aguirre Sister  
  
  
  
DVT Prophylaxis: Coumadin GI Prophylaxis: not indicated 
  
Baseline: independent 18.5 - 24.9 Normal weight / Body mass index is 23.31 kg/m². Recommended Disposition: Home w/Family ?home health vs outpt HD chair, he has home HD. Subjective: Chief Complaint / Reason for Physician Visit 
fever 11/18 Patient reports he has continued pain in his left flank and left lower quadrant. The pain medicine helps but then it comes back he does not feel that it is improved at all. He denies any nausea vomiting. He reports that some white milky fluid was noted when they changed the dressing of his right femoral catheter earlier today. He denies any shortness of breath or chest pain but feels puffy and swollen like he needs dialysis. We discussed getting his CAT scan today to evaluate the kidney lesion and worsening bleeding. He has tolerated premedication with Solu-Medrol and Benadryl in the past. 
 
11/19 pt still with  L flank pain, but pain meds help. Currently a 1. He does not see a difference. No n/v. No sob/cp. Had CT this am, without trouble. HD today via graft. No issues with graft. 11/20 pt still with L back pain, comes and goes. We discussed stopping IV narcotics and trying po. No n/v. No cp/sob. Femoral line was removed this am. 
 
Patient was evaluated at 1:30PM 
 
 
Discussed with RN events overnight. Review of Systems: 
Symptom Y/N Comments  Symptom Y/N Comments Fever/Chills    Chest Pain n   
Poor Appetite n   Edema Cough    Abdominal Pain y Sputum    Joint Pain SOB/ZAMUDIO n   Pruritis/Rash Nausea/vomit n   Tolerating PT/OT Diarrhea    Tolerating Diet y Constipation    Other Could NOT obtain due to:   
 
Objective: VITALS:  
 Last 24hrs VS reviewed since prior progress note. Most recent are: 
Patient Vitals for the past 24 hrs: 
 Temp Pulse Resp BP SpO2  
11/20/18 1115 97.7 °F (36.5 °C) 81 17 106/69 100 % 11/20/18 0835 98.1 °F (36.7 °C) 76 16 98/67 100 % 11/20/18 0252 97.8 °F (36.6 °C) 81 18 91/53 100 % 11/19/18 2242 98 °F (36.7 °C) 83 18 99/65 98 % 11/19/18 1919 98.1 °F (36.7 °C) 95 18 107/60 100 % 11/19/18 1550 97.8 °F (36.6 °C) 65 18 121/65   
11/19/18 1545  (!) 57 18 119/72   
11/19/18 1530  70 18 127/80   
11/19/18 1515  (!) 58 18 123/73   
11/19/18 1500  73 18 127/85   
11/19/18 1445  72 18 132/79  Intake/Output Summary (Last 24 hours) at 11/20/2018 1433 Last data filed at 11/20/2018 4732 Gross per 24 hour Intake 580 ml Output 2500 ml Net -1920 ml PHYSICAL EXAM: 
Patient is awake and alert oriented x3 sitting in chair no distress on room air. Cardiovascular regular rate with a systolic murmur no rubs or gallops. Lungs are clear no wheezes rhonchi or crackles. Abdomen bowel sounds are present soft some tenderness in the left lower quadrant no rebound or guarding and tenderness in the left flank to palpation, mild   Extremities-some tight edema no clubbing or cyanosis Reviewed most current lab test results and cultures  YES Reviewed most current radiology test results   YES Review and summation of old records today    NO Reviewed patient's current orders and MAR    YES 
PMH/SH reviewed - no change compared to H&P 
________________________________________________________________________ Care Plan discussed with: 
  Comments Patient y Family RN y   
Care Manager y Consultant  y Renal  
                  Multidiciplinary team rounds were held today with , nursing, pharmacist and clinical coordinator. Patient's plan of care was discussed; medications were reviewed and discharge planning was addressed. ________________________________________________________________________ Total NON critical care TIME:   Minutes Total CRITICAL CARE TIME Spent:   Minutes non procedure based Comments >50% of visit spent in counseling and coordination of care    
________________________________________________________________________ Bettina Gallagher MD  
 
Procedures: see electronic medical records for all procedures/Xrays and details which were not copied into this note but were reviewed prior to creation of Plan. LABS: 
I reviewed today's most current labs and imaging studies. Pertinent labs include: 
Recent Labs  
  11/20/18 0410 11/19/18 1247 11/18/18 
0330 WBC 10.3 6.3 9.5 HGB 9.1* 8.6* 7.7* HCT 27.5* 25.6* 23.2*  
 139* 122* Recent Labs  
  11/20/18 0410 11/19/18 1247 11/18/18 
0330  133* 136  
K 4.0 4.2 3.5 CL 99 97 101 CO2 26 20* 20* * 136* 96 BUN 31* 46* 38* CREA 9.22* 13.80* 10.90* CA 9.2 8.8 9.0 MG 2.5*  --   --   
INR 2.4*  --  3.1* Signed: Bettina Gallagher MD

## 2018-11-20 NOTE — PROGRESS NOTES
Bedside and Verbal shift change report given to Chirag Gordon (oncoming nurse) by Silvio Shaw RN (offgoing nurse). Report included the following information Kardex, MAR and Recent Results.

## 2018-11-20 NOTE — PROGRESS NOTES
F0nciuds Afebrile, VSS. Right femoral Permcath was removed by IR today. He had hemodialysis via right arm AVG yesterday. He reports BP in low 90's during HD. On exam today, right arm AV graft with good thrill, no erythema or tenderness. He will continue antibiotics as outpatient. He can discuss with his nephroplogist the  potential for midodrine prior to dialysis if he continues to have low BP at HD, in view of frequent AV graft thromboses.  
 
Kerry Landers MD

## 2018-11-20 NOTE — PROGRESS NOTES
Problem: Falls - Risk of 
Goal: *Absence of Falls Document Genevive Camera Fall Risk and appropriate interventions in the flowsheet. Outcome: Progressing Towards Goal 
Fall Risk Interventions: 
  
 
  
 
Medication Interventions: Patient to call before getting OOB

## 2018-11-20 NOTE — PROGRESS NOTES
Pharmacy Daily Dosing of Warfarin Indication: Hx of DVT, Hx of HIT Goal INR: 2-3 PTA Warfarin Dose: 2.5 mg every Tuesday and Thursday, 5 mg every other day of the week Concurrent anticoagulants:  
Concurrent antiplatelet: ASA 81 mg po Daily Major Interacting Medications ? Drugs that may increase INR:  
? Drugs that may decrease INR:  
 
Conditions that may increase/decrease INR (CHF, C. diff, cirrhosis, thyroid disorder, hypoalbuminemia):  
 
Labs: 
Recent Labs  
  11/20/18 
0410 11/19/18 
1247 11/18/18 
0330 INR 2.4*  --  3.1* HGB 9.1* 8.6* 7.7*  139* 122* Impression/Plan: · Warfarin has been on hold since admission for supra-therapeutic INR of 4.8 · Will order warfarin 7.5  mg PO x 1 dose tonight as a reload (pt has not had a dose since 11/15). Patient will likely be able to go back to his home regimen soon. · Daily INR · CBC w/o diff QOD Pharmacy will follow daily and adjust the dose as appropriate. Thanks Emelina Matias, [facility]

## 2018-11-20 NOTE — PROGRESS NOTES
08:21- Paged Dr. Christi Benton per Dr. Eliz Jones request in question of whether we could restart patient's coumadin. I was told MD was in surgery and would return phone call. Spoke to Tammie Quijano. 10:04- Ok to restart per Dr. Christi Benton. 11:15- Received order from Dr. Elly Osorio to have pharmacy dose warfarin.  Notified Ashkan Ibarra in pharmacy,

## 2018-11-20 NOTE — INTERDISCIPLINARY ROUNDS
Interdisciplinary team rounds were held 11/20/2018 with the following team members:Care Management, Nursing and Pharmacy and the patient. Plan of care discussed. See clinical pathway and/or care plan for interventions and desired outcomes. Plan for 2 week abx therapy outpatient, need to establish where pt to receive abx, HD center vs ?

## 2018-11-20 NOTE — PROGRESS NOTES
Bedside shift change report given to [address] [place] [address] (oncoming nurse) by [place] (offgoing nurse). Report included the following information SBAR, Kardex, Intake/Output, MAR and Recent Results Pt had dialysis today. IR to remove HD line tomorrow.

## 2018-11-21 LAB
ANION GAP SERPL CALC-SCNC: 10 MMOL/L (ref 5–15)
BACTERIA SPEC CULT: NORMAL
BASOPHILS # BLD: 0 K/UL (ref 0–0.1)
BASOPHILS NFR BLD: 0 % (ref 0–1)
BUN SERPL-MCNC: 46 MG/DL (ref 6–20)
BUN/CREAT SERPL: 4 (ref 12–20)
CALCIUM SERPL-MCNC: 8.5 MG/DL (ref 8.5–10.1)
CHLORIDE SERPL-SCNC: 99 MMOL/L (ref 97–108)
CO2 SERPL-SCNC: 25 MMOL/L (ref 21–32)
CREAT SERPL-MCNC: 11.3 MG/DL (ref 0.7–1.3)
DIFFERENTIAL METHOD BLD: ABNORMAL
EOSINOPHIL # BLD: 1.2 K/UL (ref 0–0.4)
EOSINOPHIL NFR BLD: 12 % (ref 0–7)
ERYTHROCYTE [DISTWIDTH] IN BLOOD BY AUTOMATED COUNT: 15.3 % (ref 11.5–14.5)
GLUCOSE SERPL-MCNC: 88 MG/DL (ref 65–100)
HCT VFR BLD AUTO: 27 % (ref 36.6–50.3)
HGB BLD-MCNC: 8.9 G/DL (ref 12.1–17)
IMM GRANULOCYTES # BLD: 0.1 K/UL (ref 0–0.04)
IMM GRANULOCYTES NFR BLD AUTO: 1 % (ref 0–0.5)
INR PPP: 1.9 (ref 0.9–1.1)
LYMPHOCYTES # BLD: 1.6 K/UL (ref 0.8–3.5)
LYMPHOCYTES NFR BLD: 15 % (ref 12–49)
MCH RBC QN AUTO: 32.2 PG (ref 26–34)
MCHC RBC AUTO-ENTMCNC: 33 G/DL (ref 30–36.5)
MCV RBC AUTO: 97.8 FL (ref 80–99)
MONOCYTES # BLD: 0.9 K/UL (ref 0–1)
MONOCYTES NFR BLD: 9 % (ref 5–13)
NEUTS SEG # BLD: 6.6 K/UL (ref 1.8–8)
NEUTS SEG NFR BLD: 63 % (ref 32–75)
NRBC # BLD: 0.05 K/UL (ref 0–0.01)
NRBC BLD-RTO: 0.5 PER 100 WBC
PLATELET # BLD AUTO: 180 K/UL (ref 150–400)
PMV BLD AUTO: 10.4 FL (ref 8.9–12.9)
POTASSIUM SERPL-SCNC: 4.1 MMOL/L (ref 3.5–5.1)
PROTHROMBIN TIME: 18.5 SEC (ref 9–11.1)
RBC # BLD AUTO: 2.76 M/UL (ref 4.1–5.7)
RBC MORPH BLD: ABNORMAL
SERVICE CMNT-IMP: NORMAL
SODIUM SERPL-SCNC: 134 MMOL/L (ref 136–145)
VANCOMYCIN SERPL-MCNC: 22.4 UG/ML
WBC # BLD AUTO: 10.4 K/UL (ref 4.1–11.1)

## 2018-11-21 PROCEDURE — 74011250636 HC RX REV CODE- 250/636: Performed by: INTERNAL MEDICINE

## 2018-11-21 PROCEDURE — 74011250637 HC RX REV CODE- 250/637: Performed by: EMERGENCY MEDICINE

## 2018-11-21 PROCEDURE — 36415 COLL VENOUS BLD VENIPUNCTURE: CPT

## 2018-11-21 PROCEDURE — 90935 HEMODIALYSIS ONE EVALUATION: CPT

## 2018-11-21 PROCEDURE — 80202 ASSAY OF VANCOMYCIN: CPT

## 2018-11-21 PROCEDURE — 85025 COMPLETE CBC W/AUTO DIFF WBC: CPT

## 2018-11-21 PROCEDURE — 65660000000 HC RM CCU STEPDOWN

## 2018-11-21 PROCEDURE — 5A1D70Z PERFORMANCE OF URINARY FILTRATION, INTERMITTENT, LESS THAN 6 HOURS PER DAY: ICD-10-PCS | Performed by: INTERNAL MEDICINE

## 2018-11-21 PROCEDURE — 74011000258 HC RX REV CODE- 258: Performed by: INTERNAL MEDICINE

## 2018-11-21 PROCEDURE — 80048 BASIC METABOLIC PNL TOTAL CA: CPT

## 2018-11-21 PROCEDURE — 85610 PROTHROMBIN TIME: CPT

## 2018-11-21 PROCEDURE — 74011250636 HC RX REV CODE- 250/636: Performed by: EMERGENCY MEDICINE

## 2018-11-21 PROCEDURE — 74011250637 HC RX REV CODE- 250/637: Performed by: INTERNAL MEDICINE

## 2018-11-21 RX ORDER — WARFARIN SODIUM 5 MG/1
5 TABLET ORAL ONCE
Status: COMPLETED | OUTPATIENT
Start: 2018-11-21 | End: 2018-11-21

## 2018-11-21 RX ORDER — GENTAMICIN SULFATE 100 MG/50ML
100 INJECTION, SOLUTION INTRAVENOUS
Status: DISCONTINUED | OUTPATIENT
Start: 2018-11-21 | End: 2018-11-23 | Stop reason: HOSPADM

## 2018-11-21 RX ADMIN — VANCOMYCIN HYDROCHLORIDE 750 MG: 750 INJECTION, POWDER, LYOPHILIZED, FOR SOLUTION INTRAVENOUS at 10:13

## 2018-11-21 RX ADMIN — PANTOPRAZOLE SODIUM 40 MG: 40 TABLET, DELAYED RELEASE ORAL at 13:13

## 2018-11-21 RX ADMIN — LACOSAMIDE 50 MG: 50 TABLET, FILM COATED ORAL at 13:13

## 2018-11-21 RX ADMIN — ERYTHROPOIETIN 14000 UNITS: 10000 INJECTION, SOLUTION INTRAVENOUS; SUBCUTANEOUS at 17:00

## 2018-11-21 RX ADMIN — OXYCODONE AND ACETAMINOPHEN 1 TABLET: 5; 325 TABLET ORAL at 07:45

## 2018-11-21 RX ADMIN — OXYCODONE AND ACETAMINOPHEN 1 TABLET: 5; 325 TABLET ORAL at 20:15

## 2018-11-21 RX ADMIN — PIPERACILLIN SODIUM,TAZOBACTAM SODIUM 3.38 G: 3; .375 INJECTION, POWDER, FOR SOLUTION INTRAVENOUS at 05:09

## 2018-11-21 RX ADMIN — SEVELAMER CARBONATE 2400 MG: 800 TABLET, FILM COATED ORAL at 17:13

## 2018-11-21 RX ADMIN — OXYCODONE AND ACETAMINOPHEN 1 TABLET: 5; 325 TABLET ORAL at 14:25

## 2018-11-21 RX ADMIN — GENTAMICIN SULFATE 134.4 MG: 40 INJECTION, SOLUTION INTRAMUSCULAR; INTRAVENOUS at 13:14

## 2018-11-21 RX ADMIN — LEVETIRACETAM 750 MG: 500 TABLET ORAL at 13:13

## 2018-11-21 RX ADMIN — Medication 10 ML: at 13:15

## 2018-11-21 RX ADMIN — LEVETIRACETAM 750 MG: 500 TABLET ORAL at 17:13

## 2018-11-21 RX ADMIN — WARFARIN SODIUM 5 MG: 5 TABLET ORAL at 17:13

## 2018-11-21 RX ADMIN — Medication 10 ML: at 05:10

## 2018-11-21 RX ADMIN — ASPIRIN 81 MG 81 MG: 81 TABLET ORAL at 13:13

## 2018-11-21 RX ADMIN — CALCITRIOL 0.5 MCG: 0.25 CAPSULE ORAL at 13:13

## 2018-11-21 RX ADMIN — ATORVASTATIN CALCIUM 20 MG: 20 TABLET, FILM COATED ORAL at 13:13

## 2018-11-21 RX ADMIN — LACOSAMIDE 50 MG: 50 TABLET, FILM COATED ORAL at 17:13

## 2018-11-21 RX ADMIN — SEVELAMER CARBONATE 2400 MG: 800 TABLET, FILM COATED ORAL at 13:13

## 2018-11-21 NOTE — PROGRESS NOTES
MD recommended trying to get an IV in patient again due to the antibiotics he is on. There is no PO form of Zosyn. Alvina BILLINGS got an IV in patient around 10:20pm.  Patients IV antibiotics are running again at this time. IV is positional, but flushes and gives blood return. Patient tolerating IV at this time.

## 2018-11-21 NOTE — PROGRESS NOTES
Infectious Disease Progress Note IMPRESSION:  
1. Sepsis , s/p fever , tachycardia temp on admission 102.7, now resolved 2. Infected R/ femoral line with discharge around line ( WC not taken ) 3. BC no growth ( , ) 4. H/o MSSA bacteremia line related , BC + 2017. Pt states access was in RUE - AVG 5. ESRD on HD , h/o failed renal transplant x 2 ,  Functioning AV fistula + 6. Left lower pole lesion hemorrhagic rather than neoplastic on Ct abd/pelvis 7. Seizure disorder PLAN:  
1. Change to  Vancomycin / Gentamicin , coverage for gram positives & negative organisms in view of femoral line infection 2. May  Dc on Vancomycin 500mg iv TIW  /Gentamicin 68 mg ( 1mg /kg) to be administered after  HD . End date  3. ECHO cardiogram 
4. Weekly CBC , CMP, Vanc & Gentamicin troughs fax reports to my office at [phone] , call with abnormal results [phone] CT abdomen / pelvis IMPRESSION: Hyperdense 2.6 x 3.0 cm left lower pole cyst demonstrating no 
appreciable enhancement through the various phases of contrast indicating that 
this is a hemorrhagic cyst rather than a neoplastic lesion. Subjective:  
 
Pt seen . in HD . No complaints Review of Systems:  A comprehensive review of systems was negative. Objective:  
 
Blood pressure 107/68, pulse 67, temperature 97.8 °F (36.6 °C), temperature source Oral, resp. rate 16, height 5' 7\" (1.702 m), weight 151 lb 0.2 oz (68.5 kg), SpO2 97 %. Temp (24hrs), Av.9 °F (36.6 °C), Min:97.6 °F (36.4 °C), Max:98.1 °F (36.7 °C) Patient Vitals for the past 24 hrs: 
 Temp Pulse Resp BP SpO2  
18 0945  67 16 107/68   
18 0930  64 16 107/65   
18 0915  64 16 103/66   
18 0900  64 16 100/69   
18 0845  66 16 108/67   
18 0830  82 16 128/64   
18 0815  68 16 107/74   
18 0809 97.8 °F (36.6 °C) 71 16 114/79   
18 0759 98 °F (36.7 °C) 78 16 100/62 97 % 11/21/18 0325 97.6 °F (36.4 °C) 78 16 97/68 98 % 11/20/18 2230 97.8 °F (36.6 °C) 78 16 109/65 100 % 11/20/18 1928 98.1 °F (36.7 °C) 82 16 105/76 99 % 11/20/18 1457 97.9 °F (36.6 °C) 82 18 98/56 99 % Lines:  Peripheral IV:    and AVF Physical Exam:  
General:  Alert, cooperative, Eyes:  Sclera anicteric. Pupils equally round and reactive to light. Mouth/Throat: Mucous membranes normal, oral pharynx clear Neck: Supple Lungs:    Reduced auscultation  bases CV:  Regular rate and rhythm,no murmur, click, rub or gallop Abdomen:   Soft, non-tender. bowel sounds normal. non-distended Extremities: No  Edema, R/femoral line examined , clean at this time Lymph nodes: Cervical and supraclavicular normal  
Lines/Devices:  Intact, no erythema, drainage or tenderness Data Review: CBC:  
Recent Labs  
  11/21/18 
0808 11/20/18 
0410 11/19/18 
1247 WBC 10.4 10.3 6.3  
RBC 2.76* 2.86* 2.67* HGB 8.9* 9.1* 8.6* HCT 27.0* 27.5* 25.6*  
 176 139* GRANS 63 83* 93* LYMPH 15 6* 6*  
EOS 12* 0 1  
 
CMP:  
Recent Labs  
  11/21/18 
0808 11/20/18 
0410 11/19/18 
1247 GLU 88 108* 136* * 136 133* K 4.1 4.0 4.2 CL 99 99 97 CO2 25 26 20* BUN 46* 31* 46* CREA 11.30* 9.22* 13.80* CA 8.5 9.2 8.8 AGAP 10 11 16* BUCR 4* 3* 3* Studies:     
Lab Results Component Value Date/Time Culture result: NO GROWTH 2 DAYS 11/19/2018 03:35 PM  
 Culture result: NO GROWTH 5 DAYS 11/16/2018 08:32 PM  
 Culture result: NO GROWTH 5 DAYS 06/03/2017 05:16 AM  
 Culture result: (A) 06/01/2017 09:46 AM  
  STAPHYLOCOCCUS AUREUS 
ISOLATED FROM ALL 4 BOTTLES DRAWN. ..LAC AND LAC 1010 SITES Culture result: (A) 06/01/2017 09:46 AM  
  PRELIMINARY REPORT OF 
GRAM POSITIVE COCCI 
IN CLUSTERS 
GROWING IN 2 OF 4 BOTTLES DRAWN 
CALLED TO AND READ BACK BY 
MS ALFREDA ADEN RN ON 6/1/17 AT [address] [facility] 2011).  MS 
  
  
 
XR Results (most recent): 
 Results from Hospital Encounter encounter on 11/16/18 XR CHEST PORT Narrative INDICATION:  acute fever, vascular access infection COMPARISON: 6/1/2017 FINDINGS: Single AP portable view of the chest obtained at 2048 demonstrates a 
stable cardiomediastinal silhouette. The lungs are clear bilaterally. No osseous 
abnormalities are seen. There is a left-sided pacemaker device. Right arm 
vascular stents are noted. Impression IMPRESSION: No evidence of acute cardiopulmonary process. Patient Active Problem List  
Diagnosis Code  Kidney transplant  Coronary atherosclerosis of native coronary artery I25.10  Nausea & vomiting R11.2  Serum total bilirubin elevated R17  Abdominal pain R10.9  
 HTN (hypertension) I10  
 Anemia D64.9  
 S/P appendectomy Z90.49  
 High cholesterol E78.00  
 Other ill-defined conditions(799.89) R69  
 CAD (coronary artery disease) I25.10  Gastrointestinal disorder K92.9  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[facility] D69.6  Peritonitis ([facility] K65.9  Malnutrition ([facility] E46  
 DVT (deep venous thrombosis) ([facility]) I82.409  S/P IVC filter [id number]  Arterial occlusion I70.90  
 S/p cadaver renal transplant Z94.0  AICD (automatic cardioverter/defibrillator) present Z95.810  
 ESRD (end stage renal disease) on dialysis ([facility]) N18.6, Z99.2  Dialysis patient ([facility] Z99.2  Hyperkalemia E87.5  Congestive heart failure, unspecified I50.9  Hypoglycemia E16.2  Sepsis ([facility] A41.9 ICD-10-CM ICD-9-CM 1. Sepsis, due to unspecified organism ([facility] A41.9 038.9   
  995.91   
2. ESRD (end stage renal disease) on dialysis ([facility]) N18.6 585.6 Z99.2 V45.11   
3. Hypotension, unspecified hypotension type I95.9 458.9 4. Acute hypokalemia E87.6 276.8 5. Supratherapeutic INR R79.1 790.92 I have discussed the diagnosis with the patient and the intended plan as seen in the above orders. I have discussed medication side effects and warnings with the patient as well. Reviewed test results at length with patient Anti-infectives:  
 
 Vancomycin IV - 11/17 Zosyn IV - 11/17  Woody Ball MD FACP

## 2018-11-21 NOTE — PROGRESS NOTES
Nephrology Progress Note Blaise Leblanc  
 
[facility]. [facility]                  Phone - [phone] Patient: Gus Rios YOB: 1975     Admit Date: 11/16/2018 Date- 11/21/2018 CC: Follow up for ESRD Subjective: Interval History: - Seen in dialysis where we are using the AVF without problems. The permacath was removed 11/20. Pt notes mild SOB and mild edema. I've asked the dialysis RN to increase the UF. Blood cultures negative Ct abdo showed hemorrhagic cyst. No mass ROS: no HEENT complaints. No fever/chills. No chest pain. No abd pain. No joint pain. No skin rashes. Assessment: 
· esrd- Home HD 5 days/ week, followed by Dr. Cherelle Del Cid. On a MWF schedule here in the hospital. 
 
· Anemia of CKD · Secondary hyperparathyroidism of renal origin · Sepsis · H/o hypertension · Left renal cyst with hemorrhage · Plan:  
 
Dialysis now. Aim for more fluid removed. Dietary fluid restriction. · Pt will need vancomycin after discharge. We will ask  to set up out pt antibiotcs. · He may have to go to dialysis unit to get his antibiotics · CT ABDO results d/w pt- he will need urology follow up as out pt · He is on coumadin for DVT, clotting access. · Increase EPO. Apparently 37365 is the max dose that can be given, so increased to 4 days/wk. · Continue calcitriol · Continue renvela Physical Exam:  
General: Middle-aged man in no distress Resp:  Lungs: diminished breath sounds at the bases; no rales or wheezes CV:  RRR; no gallop or rub GI:  Soft and non-tender Neurologic:  Alert and oriented X 3. Non Focal 
Skin:  no rashes or ulcers. No jaundice Extrem:           Trace edema Psych:              Normal affect and mood Right arm av graft + Care Plan discussed with: patient and dialysis RN. Objective:  
Patient Vitals for the past 24 hrs: 
 Temp Pulse Resp BP SpO2 11/21/18 0945  67 16 107/68   
11/21/18 0930  64 16 107/65   
11/21/18 0915  64 16 103/66   
11/21/18 0900  64 16 100/69   
11/21/18 0845  66 16 108/67   
11/21/18 0830  82 16 128/64   
11/21/18 0815  68 16 107/74   
11/21/18 0809 97.8 °F (36.6 °C) 71 16 114/79   
11/21/18 0759 98 °F (36.7 °C) 78 16 100/62 97 % 11/21/18 0325 97.6 °F (36.4 °C) 78 16 97/68 98 % 11/20/18 2230 97.8 °F (36.6 °C) 78 16 109/65 100 % 11/20/18 1928 98.1 °F (36.7 °C) 82 16 105/76 99 % 11/20/18 1457 97.9 °F (36.6 °C) 82 18 98/56 99 % 11/20/18 1115 97.7 °F (36.5 °C) 81 17 106/69 100 % Last 3 Recorded Weights in this Encounter 11/19/18 2056 11/20/18 0418 11/21/18 0423 Weight: 68.4 kg (150 lb 12.7 oz) 67.5 kg (148 lb 13 oz) 68.5 kg (151 lb 0.2 oz)  
 
11/19 1901 - 11/21 0700 In: 760 [P.O.:720; I.V.:200] Out: 0 Chart reviewed. Pertinent Notes reviewed. Medication list  reviewed Current Facility-Administered Medications Medication  aspirin chewable tablet 81 mg  
 oxyCODONE-acetaminophen (PERCOCET) 5-325 mg per tablet 1 Tab  WARFARIN INFORMATION NOTE (COUMADIN)  epoetin emilie (EPOGEN;PROCRIT) 14,000 Units  sodium chloride (NS) flush 5-10 mL  sodium chloride (NS) flush 5-10 mL  acetaminophen (TYLENOL) tablet 650 mg  
 ondansetron (ZOFRAN) injection 4 mg  piperacillin-tazobactam (ZOSYN) 3.375 g in 0.9% sodium chloride (MBP/ADV) 100 mL  naloxone (NARCAN) injection 0.1 mg  
 atorvastatin (LIPITOR) tablet 20 mg  
 calcitRIOL (ROCALTROL) capsule 0.5 mcg  lacosamide (VIMPAT) tablet 50 mg  levETIRAcetam (KEPPRA) tablet 750 mg  
 pantoprazole (PROTONIX) tablet 40 mg  
 sevelamer carbonate (RENVELA) tab 2,400 mg  
 vancomycin (VANCOCIN) 750 mg in 0.9% sodium chloride 250 mL ([id number]) And  Vancomycin HD reminder note Data Review : 
Recent Labs  
  11/21/18 
8861 11/20/18 
0410 11/19/18 
1247 WBC 10.4 10.3 6.3 HGB 8.9* 9.1* 8.6*  
 176 139* ANEU 6.6 8.6* 5.8 INR  --  2.4*  --   
* 136 133* K 4.1 4.0 4.2 GLU 88 108* 136* BUN 46* 31* 46* CREA 11.30* 9.22* 13.80* CA 8.5 9.2 8.8 MG  --  2.5*  --   
 
Lab Results Component Value Date/Time Culture result: NO GROWTH 2 DAYS 11/19/2018 03:35 PM  
 Culture result: NO GROWTH 5 DAYS 11/16/2018 08:32 PM  
 Culture result: NO GROWTH 5 DAYS 06/03/2017 05:16 AM  
 
Lab Results Component Value Date/Time Specimen Description: NARES 05/30/2013 05:53 PM  
 Specimen Description: URINE 11/16/2012 05:15 PM  
 Specimen Description: URINE 09/17/2012 01:35 AM  
 
No results for input(s): FE, TIBC, PSAT, FERR in the last 72 hours. Lab Results Component Value Date/Time Sodium,urine random 115 07/14/2011 07:20 PM  
 Creatinine, urine 52.2 07/14/2011 07:20 PM  
 
 
 
 
Humberto Jeff MD 
[facility] 
 www. [facility] [employer] [facility] [place], [address] Phone - [phone] Fax - [phone]

## 2018-11-21 NOTE — PROGRESS NOTES
Bedside and Verbal shift change report given to Amy Soto RN (oncoming nurse) by Edvin Mckee RN (offgoing nurse). Report included the following information SBAR, Kardex, MAR and Recent Results.

## 2018-11-21 NOTE — PROGRESS NOTES
18:17- PIV placed by PICC team RN. At shift change, PIV found to be infiltrated. Attempted x1 with vein finder without success. Notified oncoming RN and nursing supervisor.

## 2018-11-21 NOTE — PROGRESS NOTES
After two unsuccessful attempts to initiate a PIV pt informed that we will contact his MD for further orders. Pt verbalizes understanding and inquires as to whether or not the IV antibiotic could be administered during his dialysis tomorrow.  Informed him that we would ask this of his MD.

## 2018-11-21 NOTE — PROGRESS NOTES
Pharmacy Daily Dosing of Warfarin Indication: Hx of DVT, Hx of HIT Goal INR: 2-3 PTA Warfarin Dose: 2.5 mg every Tuesday and Thursday, 5 mg every other day of the week Concurrent anticoagulants:  
Concurrent antiplatelet: ASA 81 mg po Daily Major Interacting Medications ? Drugs that may increase INR:  
? Drugs that may decrease INR:  
 
Conditions that may increase/decrease INR (CHF, C. diff, cirrhosis, thyroid disorder, hypoalbuminemia):  
 
Labs: 
Recent Labs  
  11/21/18 
1612 11/21/18 
0808 11/20/18 
0410 11/19/18 
1247 INR 1.9*  --  2.4*  --   
HGB  --  8.9* 9.1* 8.6* PLT  --  180 176 139* Impression/Plan: · Warfarin 5 mg tonight. · Patient will likely be able to go back to his home regimen soon. · Daily INR · CBC w/o diff QOD Pharmacy will follow daily and adjust the dose as appropriate. Thanks Es Ferrer, [facility] 
 
 
http://[facility]/[place]/[facility]/Pharmacy/Clinical%20Companion/Warfarin%20Dosing%20Protocol. pdf

## 2018-11-21 NOTE — PROGRESS NOTES
Pharmacy Automatic Renal Dosing Protocol - Antimicrobials Indication for Antimicrobials: Sepsis; Bacteremia  in setting of HD (at home) Current Regimen of Each Antimicrobial: 
Vancomycin 1.5gm IV x1, then 750mg IV after each HD (Start Date ; Day # 6) Gentamicin 2 mg/kg x 1, followed by 1.5 mg/kg after each HD (Start ; Day # 1) Previous Antimicrobial Therapy: 
Piperacillin-tazobactam 3.375 gm IV q12h (Start Date ; Day # 5) Goal Level: VANCOMYCIN TROUGH GOAL RANGE Vancomycin Trough: 15 - 20 mcg/mL Date Dose & Interval Measured (mcg/mL) Extrapolated (mcg/mL) Significant Cultures:  
: Blood = NG x 5 days (Final) 
:  Blood = NG x 2 days (Prelim) Radiology / Imaging results: (X-ray, CT scan or MRI):  
: CT Abd: Increased density of lower pole left renal cyst likely reflects 
interval hemorrhage into cyst which could indicate underlying neoplasm though 
this is not clearly seen. There are otherwise no acute intra-abdominal findings 
with incidental findings as above. Paralysis, amputations, malnutrition: none noted Labs: 
Recent Labs  
  18 
2266 18 
0410 18 
1247 CREA 11.30* 9.22* 13.80* BUN 46* 31* 46* WBC 10.4 10.3 6.3 Temp (24hrs), Av.9 °F (36.6 °C), Min:97.6 °F (36.4 °C), Max:98.1 °F (36.7 °C) Creatinine Clearance (mL/min) or Dialysis: ESRD on HD (MWF?) Impression/Plan: · ID consulted · Vancomycin Trough = 22.4. Will adjust vancomycin  to 500 mg pHD to be given at the end of next HD. · Gentamicin started in place of Zosyn, see dosing above for HD · Antimicrobial stop date: End date  Pharmacy will follow daily and adjust medications as appropriate for renal function and/or serum levels. Thank you, Geoff Walker, PHARMD 
 
Recommended duration of therapy 
http://[facility]/[place]/[facility]/Pharmacy/Clinical%20Companion/Duration%20of%20ABX%20therapy. docx Renal Dosing 
http://[facility]/[place]/[facility]/Pharmacy/Clinical%20Companion/Renal%[id number]. pdf

## 2018-11-21 NOTE — PROGRESS NOTES
07:15- Received report from [place], off going RN. 07:25- Notified by telemetry room that pt had 5 beats of v-tach. Pt asymptomatic. Notified Dr. Roxanne Zamorano. No new orders received at this time.

## 2018-11-21 NOTE — DIALYSIS
[facility] Dialysis Team [place]  [phone] Vitals   Pre   Post   Assessment   Pre   Post    
Temp  Temp: 97.8 °F (36.6 °C) (11/21/18 0809)  97.5 LOC  A&Ox3 A&Ox3 HR   Pulse (Heart Rate): 71 (11/21/18 0809) 69 Lungs   clear clear B/P   BP: 114/79 (11/21/18 0809) 107/60 Cardiac   RRR  RRR Resp   Resp Rate: 16 (11/21/18 0809) 16 Skin   Warm, dry and intact Warm, dry and intact Pain level  Pain Intensity 1: 6 (11/21/18 0745) 2 Edema  generalized generalized Orders: Duration:   Start:    0809 End:    1119 Total:   3 hours Dialyzer:   Dialyzer/Set Up Inspection: Manuela Lange (11/21/18 0809) K Bath:   Dialysate K (mEq/L): 3 (11/21/18 0809) Ca Bath:   Dialysate CA (mEq/L): 2.5 (11/21/18 0809) Na/Bicarb:   Dialysate NA (mEq/L): 140 (11/21/18 0809) Target Fluid Removal: Access Type & Location:   KUSHAL AVG, + b/t. Access cleaned with alcohol per policy and procedure. Cannulated with 15gx2 w/o difficulty. Labs Obtained/Reviewed Critical Results Called   Date when labs were drawn- 
Hgb-   
HGB Date Value Ref Range Status 11/20/2018 9.1 (L) 12.1 - 17.0 g/dL Final  
 
K-   
Potassium Date Value Ref Range Status 11/20/2018 4.0 3.5 - 5.1 mmol/L Final  
 
Ca-  
Calcium Date Value Ref Range Status 11/20/2018 9.2 8.5 - 10.1 MG/DL Final  
 
Bun-  
BUN Date Value Ref Range Status 11/20/2018 31 (H) 6 - 20 MG/DL Final  
 
Creat-  
Creatinine Date Value Ref Range Status 11/20/2018 9.22 (H) 0.70 - 1.30 MG/DL Final  
  Comment:  
  INVESTIGATED PER DELTA CHECK PROTOCOL Medications/ Blood Products Given Name   Dose   Route and Time Vancomycin 750mg IV @ 1020 Blood Volume Processed (BVP):    80.1 Net Fluid Removed:  3000Ml Comments All dialysis related medications have been reviewed. Assessment performed by RN. Procedure and documentation observed and reviewed by Eugenia Hernandez RN. Time Out Done: 5532 Primary Nurse Rpt Pre: Green Marshal, RN 
Primary Nurse Rpt Post: Debi Moreland RN 
Pt Education: procedural 
Care Plan: on going Tx Summary: 
0809:  SBAR received from Primary RN. Pt arrived to HD suite A&Ox3, denies complaints. Pt has KUSHAL AVG, + b/t. Access cleaned with alcohol per policy and procedure. Cannulated with 15gx2 w/o difficulty. VSS, tx initiated. 0815:  Pt resting well 0830:  Pt resting well 0845:  Pt resting well 
0900:  Pt resting well 
0915:  Pt resting well 
0930:  Pt resting well 
0945:  Pt alert, Dr. Betty Singh at bedside; goal increased to 3000 per MD. 
1000:  Pt resting well 1015:  Pt resting well 
1030:  Pt resting well 1045:  Pt resting well 
1100:  Pt resting well 1115:  Pt resting well 1119: Tx terminated. All possible blood returned to patient. Needles pulled, manual pressure held for approximately 5 minutes per site. SBAR called to Primary RN. VSS, pt returned to room. Admiting Diagnosis: sepsis Pt's previous clinic- [address] ([facility]) - home therapies Consent signed - Informed Consent Verified: Yes (11/21/18 0809) Funmilayoita Consent - signed and on file Hepatitis Status- neg 2/5/18 >1000 Machine #- Machine Number: [id number] (11/21/18 5342) Telemetry status- 
Pre-dialysis wt. -

## 2018-11-21 NOTE — PROGRESS NOTES
Hospitalist Progress Note NAME: Kalia Ley :  1975 MRN:  [id number] Assessment / Plan: 
Sepsis secondary to suspected infected HD femoral cath AICD Continue current abx Follow-up blood culture-NGTD Vascular surgery appreciated 
appreciated ID and renal eval 
End-stage renal disease Continue hemodialysis as per nephrology History of DVT Continue coumadin and follow up INR Seizure We will continue home seizure medication Coronary artery disease AICD We will continue home medication Lipitor Peripheral vascular disease 
on hold Plavix aspirin Abdominal pain/back pain Resolved Code Status: full code Surrogate Decision Maker:Brigette Mota,   
DVT Prophylaxis: Coumadin GI Prophylaxis: not indicated Baseline: independent 18.5 - 24.9 Normal weight / Body mass index is 23.65 kg/m². Recommended Disposition: Home w/Family ?home health vs outpt HD chair, he has home HD. Subjective: Chief Complaint / Reason for Physician Visit Patient did not have complaints in my visit Discussed with RN events overnight. Review of Systems: 
Symptom Y/N Comments  Symptom Y/N Comments Fever/Chills    Chest Pain n   
Poor Appetite n   Edema Cough    Abdominal Pain y Sputum    Joint Pain SOB/ZAMUDIO n   Pruritis/Rash Nausea/vomit n   Tolerating PT/OT Diarrhea    Tolerating Diet y Constipation    Other Could NOT obtain due to:   
 
Objective: VITALS:  
Last 24hrs VS reviewed since prior progress note. Most recent are: 
Patient Vitals for the past 24 hrs: 
 Temp Pulse Resp BP SpO2  
18 1506    94/48   
18 1500 98.3 °F (36.8 °C) 77 16 (!) 82/48 100 % 18 1222 98.2 °F (36.8 °C) 69 14 109/72 100 % 18 1115 97.5 °F (36.4 °C) 69 16 107/60   
18 1100  61 16 98/53   
18 1045  61 16 104/65   
18 1030  62 16 102/62   
18 1015  63 16 101/63   
18 1000  62 16 107/67  11/21/18 0945  67 16 107/68   
11/21/18 0930  64 16 107/65   
11/21/18 0915  64 16 103/66   
11/21/18 0900  64 16 100/69   
11/21/18 0845  66 16 108/67   
11/21/18 0830  82 16 128/64   
11/21/18 0815  68 16 107/74   
11/21/18 0809 97.8 °F (36.6 °C) 71 16 114/79   
11/21/18 0759 98 °F (36.7 °C) 78 16 100/62 97 % 11/21/18 0325 97.6 °F (36.4 °C) 78 16 97/68 98 % 11/20/18 2230 97.8 °F (36.6 °C) 78 16 109/65 100 % 11/20/18 1928 98.1 °F (36.7 °C) 82 16 105/76 99 % Intake/Output Summary (Last 24 hours) at 11/21/2018 1747 Last data filed at 11/21/2018 1430 Gross per 24 hour Intake 680 ml Output 3000 ml Net -2320 ml PHYSICAL EXAM: 
Patient is awake and alert oriented x3  
sitting in chair no distress on room air. Cardiovascular regular rate with a systolic murmur no rubs or gallops. Lungs are clear no wheezes rhonchi or crackles. Abdomen bowel sounds are present soft some tenderness in the left lower quadrant no rebound or guarding and tenderness in the left flank to palpation, mild Extremities-some tight edema no clubbing or cyanosis Reviewed most current lab test results and cultures  YES Reviewed most current radiology test results   YES Review and summation of old records today    NO Reviewed patient's current orders and MAR    YES 
PMH/ reviewed - no change compared to H&P 
________________________________________________________________________ Care Plan discussed with: 
  Comments Patient y Family RN y   
Care Manager y Consultant  y Renal  
                  Multidiciplinary team rounds were held today with , nursing, pharmacist and clinical coordinator. Patient's plan of care was discussed; medications were reviewed and discharge planning was addressed. ________________________________________________________________________ Total NON critical care TIME:  30 Minutes Total CRITICAL CARE TIME Spent:   Minutes non procedure based Comments >50% of visit spent in counseling and coordination of care    
________________________________________________________________________ Zhanna Clemons MD  
 
Procedures: see electronic medical records for all procedures/Xrays and details which were not copied into this note but were reviewed prior to creation of Plan. LABS: 
I reviewed today's most current labs and imaging studies. Pertinent labs include: 
Recent Labs  
  11/21/18 
0808 11/20/18 0410 11/19/18 
1247 WBC 10.4 10.3 6.3 HGB 8.9* 9.1* 8.6* HCT 27.0* 27.5* 25.6*  
 176 139* Recent Labs  
  11/21/18 
1612 11/21/18 
0808 11/20/18 0410 11/19/18 
1247 NA  --  134* 136 133* K  --  4.1 4.0 4.2 CL  --  99 99 97 CO2  --  25 26 20* GLU  --  88 108* 136* BUN  --  46* 31* 46* CREA  --  11.30* 9.22* 13.80* CA  --  8.5 9.2 8.8 MG  --   --  2.5*  --   
INR 1.9*  --  2.4*  --   
 
 
Signed:  Zhanna Clemons MD

## 2018-11-21 NOTE — PROGRESS NOTES
Infectious Disease Progress Note IMPRESSION:  
1. Sepsis , s/p fever , tachycardia temp on admission 102.7 2. Infected R/ femoral line with discharge around line ( WC not taken ) 3. BC no growth 4. H/o MSSA bacteremia line related , BC + 2017 5. ESRD on HD , h/o failed renal transplant x 2 ,  Functioning AV fistula + 6. Left lower pole lesion hemorrhagic rather than neoplastic on Ct abd/pelvis 7. Seizure disorder PLAN:  
1. Continue Vancomycin / Zosyn 2. Await final cultures, plan for Dc on Vancomycin / Unasyn or Vanc /Gent to be administered during HD . End date  3. ECHO cardiogram    
 CT abdomen / pelvis IMPRESSION: Hyperdense 2.6 x 3.0 cm left lower pole cyst demonstrating no 
appreciable enhancement through the various phases of contrast indicating that 
this is a hemorrhagic cyst rather than a neoplastic lesion. Subjective:  
 
Pt seen . S/p removal of femoral line  Feels well. Doing work on laptop Spoke to IR earlier today . Femoral line did not look infected when  removed ,  Tip not sent for culture Review of Systems:  A comprehensive review of systems was negative. Objective:  
 
Blood pressure 97/68, pulse 78, temperature 97.6 °F (36.4 °C), resp. rate 16, height 5' 7\" (1.702 m), weight 151 lb 0.2 oz (68.5 kg), SpO2 98 %. Temp (24hrs), Av.9 °F (36.6 °C), Min:97.6 °F (36.4 °C), Max:98.1 °F (36.7 °C) Patient Vitals for the past 24 hrs: 
 Temp Pulse Resp BP SpO2  
18 0325 97.6 °F (36.4 °C) 78 16 97/68 98 % 18 2230 97.8 °F (36.6 °C) 78 16 109/65 100 % 18 1928 98.1 °F (36.7 °C) 82 16 105/76 99 % 18 1457 97.9 °F (36.6 °C) 82 18 98/56 99 % 18 1115 97.7 °F (36.5 °C) 81 17 106/69 100 % 18 0835 98.1 °F (36.7 °C) 76 16 98/67 100 % Lines:  Peripheral IV:    and AVF Physical Exam:  
General:  Alert, cooperative, Eyes:  Sclera anicteric. Pupils equally round and reactive to light. Mouth/Throat: Mucous membranes normal, oral pharynx clear Neck: Supple Lungs:    Reduced auscultation  bases CV:  Regular rate and rhythm,no murmur, click, rub or gallop Abdomen:   Soft, non-tender. bowel sounds normal. non-distended Extremities: No  Edema, R/femoral line examined , clean at this time Lymph nodes: Cervical and supraclavicular normal  
Lines/Devices:  Intact, no erythema, drainage or tenderness Data Review: CBC:  
Recent Labs  
  11/20/18 
0410 11/19/18 
1247 WBC 10.3 6.3  
RBC 2.86* 2.67* HGB 9.1* 8.6* HCT 27.5* 25.6*  
 139* GRANS 83* 93* LYMPH 6* 6*  
EOS 0 1 CMP:  
Recent Labs  
  11/20/18 
0410 11/19/18 
1247 * 136*  133* K 4.0 4.2 CL 99 97 CO2 26 20* BUN 31* 46* CREA 9.22* 13.80* CA 9.2 8.8 AGAP 11 16* BUCR 3* 3* Studies:     
Lab Results Component Value Date/Time Culture result: NO GROWTH AFTER 12 HOURS 11/19/2018 03:35 PM  
 Culture result: NO GROWTH 4 DAYS 11/16/2018 08:32 PM  
 Culture result: NO GROWTH 5 DAYS 06/03/2017 05:16 AM  
 Culture result: (A) 06/01/2017 09:46 AM  
  STAPHYLOCOCCUS AUREUS 
ISOLATED FROM ALL 4 BOTTLES DRAWN. ..LAC AND LAC 1010 SITES Culture result: (A) 06/01/2017 09:46 AM  
  PRELIMINARY REPORT OF 
GRAM POSITIVE COCCI 
IN CLUSTERS 
GROWING IN 2 OF 4 BOTTLES DRAWN 
CALLED TO AND READ BACK BY 
MS ALFREDA ADEN RN ON 6/1/17 AT [address] [facility] 3113). MS 
  
  
 
XR Results (most recent): 
Results from Hospital Encounter encounter on 11/16/18 XR CHEST PORT Narrative INDICATION:  acute fever, vascular access infection COMPARISON: 6/1/2017 FINDINGS: Single AP portable view of the chest obtained at 2048 demonstrates a 
stable cardiomediastinal silhouette. The lungs are clear bilaterally. No osseous 
abnormalities are seen. There is a left-sided pacemaker device. Right arm 
vascular stents are noted. Impression IMPRESSION: No evidence of acute cardiopulmonary process. Patient Active Problem List  
Diagnosis Code  Kidney transplant  Coronary atherosclerosis of native coronary artery I25.10  Nausea & vomiting R11.2  Serum total bilirubin elevated R17  Abdominal pain R10.9  
 HTN (hypertension) I10  
 Anemia D64.9  
 S/P appendectomy Z90.49  
 High cholesterol E78.00  
 Other ill-defined conditions(799.89) R69  
 CAD (coronary artery disease) I25.10  Gastrointestinal disorder K92.9  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[facility] D69.6  Peritonitis ([facility] K65.9  Malnutrition ([facility] E46  
 DVT (deep venous thrombosis) (formerly [facility]) I82.409  S/P IVC filter [id number]  Arterial occlusion I70.90  
 S/p cadaver renal transplant Z94.0  AICD (automatic cardioverter/defibrillator) present Z95.810  
 ESRD (end stage renal disease) on dialysis (formerly [facility]) N18.6, Z99.2  Dialysis patient ([facility] Z99.2  Hyperkalemia E87.5  Congestive heart failure, unspecified I50.9  Hypoglycemia E16.2  Sepsis ([facility] A41.9 ICD-10-CM ICD-9-CM 1. Sepsis, due to unspecified organism ([facility] A41.9 038.9   
  995.91   
2. ESRD (end stage renal disease) on dialysis (formerly [facility]) N18.6 585.6 Z99.2 V45.11   
3. Hypotension, unspecified hypotension type I95.9 458.9 4. Acute hypokalemia E87.6 276.8 5. Supratherapeutic INR R79.1 790.92 I have discussed the diagnosis with the patient and the intended plan as seen in the above orders. I have discussed medication side effects and warnings with the patient as well. Reviewed test results at length with patient Anti-infectives:  
 
 Vancomycin IV - 11/17 Zosyn IV - 11/17  Shelia Metcalf MD FACP

## 2018-11-21 NOTE — PROGRESS NOTES
Interdisciplinary team rounds were held 11/21/2018 with the following team members:Care Management, Nursing, Pharmacy and Physician and the patient. Plan of care discussed. See clinical pathway and/or care plan for interventions and desired outcomes. Outpatient antibiotics with HH vs at HD center, ID made changes to abx therapy today.

## 2018-11-22 LAB
ALBUMIN SERPL-MCNC: 2.9 G/DL (ref 3.5–5)
ALBUMIN/GLOB SERPL: 0.7 {RATIO} (ref 1.1–2.2)
ALP SERPL-CCNC: 68 U/L (ref 45–117)
ALT SERPL-CCNC: 14 U/L (ref 12–78)
ANION GAP SERPL CALC-SCNC: 5 MMOL/L (ref 5–15)
AST SERPL-CCNC: 12 U/L (ref 15–37)
BASOPHILS # BLD: 0.1 K/UL (ref 0–0.1)
BASOPHILS NFR BLD: 1 % (ref 0–1)
BILIRUB SERPL-MCNC: 0.4 MG/DL (ref 0.2–1)
BUN SERPL-MCNC: 29 MG/DL (ref 6–20)
BUN/CREAT SERPL: 4 (ref 12–20)
CALCIUM SERPL-MCNC: 8.8 MG/DL (ref 8.5–10.1)
CHLORIDE SERPL-SCNC: 100 MMOL/L (ref 97–108)
CO2 SERPL-SCNC: 30 MMOL/L (ref 21–32)
CREAT SERPL-MCNC: 7.8 MG/DL (ref 0.7–1.3)
DIFFERENTIAL METHOD BLD: ABNORMAL
EOSINOPHIL # BLD: 1.5 K/UL (ref 0–0.4)
EOSINOPHIL NFR BLD: 13 % (ref 0–7)
ERYTHROCYTE [DISTWIDTH] IN BLOOD BY AUTOMATED COUNT: 15.7 % (ref 11.5–14.5)
GLOBULIN SER CALC-MCNC: 4.3 G/DL (ref 2–4)
GLUCOSE SERPL-MCNC: 78 MG/DL (ref 65–100)
HCT VFR BLD AUTO: 28.7 % (ref 36.6–50.3)
HGB BLD-MCNC: 9.3 G/DL (ref 12.1–17)
IMM GRANULOCYTES # BLD: 0.1 K/UL (ref 0–0.04)
IMM GRANULOCYTES NFR BLD AUTO: 1 % (ref 0–0.5)
INR PPP: 2.1 (ref 0.9–1.1)
IRON SATN MFR SERPL: 24 % (ref 20–50)
IRON SERPL-MCNC: 57 UG/DL (ref 35–150)
LYMPHOCYTES # BLD: 1.7 K/UL (ref 0.8–3.5)
LYMPHOCYTES NFR BLD: 15 % (ref 12–49)
MCH RBC QN AUTO: 32.2 PG (ref 26–34)
MCHC RBC AUTO-ENTMCNC: 32.4 G/DL (ref 30–36.5)
MCV RBC AUTO: 99.3 FL (ref 80–99)
MONOCYTES # BLD: 1.2 K/UL (ref 0–1)
MONOCYTES NFR BLD: 11 % (ref 5–13)
NEUTS SEG # BLD: 6.6 K/UL (ref 1.8–8)
NEUTS SEG NFR BLD: 59 % (ref 32–75)
NRBC # BLD: 0.04 K/UL (ref 0–0.01)
NRBC BLD-RTO: 0.4 PER 100 WBC
PHOSPHATE SERPL-MCNC: 3.9 MG/DL (ref 2.6–4.7)
PLATELET # BLD AUTO: 199 K/UL (ref 150–400)
PMV BLD AUTO: 10.4 FL (ref 8.9–12.9)
POTASSIUM SERPL-SCNC: 4.1 MMOL/L (ref 3.5–5.1)
PROT SERPL-MCNC: 7.2 G/DL (ref 6.4–8.2)
PROTHROMBIN TIME: 20.6 SEC (ref 9–11.1)
RBC # BLD AUTO: 2.89 M/UL (ref 4.1–5.7)
SODIUM SERPL-SCNC: 135 MMOL/L (ref 136–145)
TIBC SERPL-MCNC: 234 UG/DL (ref 250–450)
WBC # BLD AUTO: 11.1 K/UL (ref 4.1–11.1)

## 2018-11-22 PROCEDURE — 83540 ASSAY OF IRON: CPT

## 2018-11-22 PROCEDURE — 84100 ASSAY OF PHOSPHORUS: CPT

## 2018-11-22 PROCEDURE — 74011250636 HC RX REV CODE- 250/636: Performed by: INTERNAL MEDICINE

## 2018-11-22 PROCEDURE — 80053 COMPREHEN METABOLIC PANEL: CPT

## 2018-11-22 PROCEDURE — 85025 COMPLETE CBC W/AUTO DIFF WBC: CPT

## 2018-11-22 PROCEDURE — 85610 PROTHROMBIN TIME: CPT

## 2018-11-22 PROCEDURE — 36415 COLL VENOUS BLD VENIPUNCTURE: CPT

## 2018-11-22 PROCEDURE — 74011250637 HC RX REV CODE- 250/637: Performed by: EMERGENCY MEDICINE

## 2018-11-22 PROCEDURE — 65660000000 HC RM CCU STEPDOWN

## 2018-11-22 PROCEDURE — 74011250637 HC RX REV CODE- 250/637: Performed by: INTERNAL MEDICINE

## 2018-11-22 RX ORDER — WARFARIN 2.5 MG/1
2.5 TABLET ORAL ONCE
Status: COMPLETED | OUTPATIENT
Start: 2018-11-22 | End: 2018-11-22

## 2018-11-22 RX ORDER — METOPROLOL SUCCINATE 25 MG/1
12.5 TABLET, EXTENDED RELEASE ORAL DAILY
Status: DISCONTINUED | OUTPATIENT
Start: 2018-11-23 | End: 2018-11-23 | Stop reason: HOSPADM

## 2018-11-22 RX ADMIN — LEVETIRACETAM 750 MG: 500 TABLET ORAL at 17:23

## 2018-11-22 RX ADMIN — SEVELAMER CARBONATE 2400 MG: 800 TABLET, FILM COATED ORAL at 08:18

## 2018-11-22 RX ADMIN — ASPIRIN 81 MG 81 MG: 81 TABLET ORAL at 08:18

## 2018-11-22 RX ADMIN — OXYCODONE AND ACETAMINOPHEN 1 TABLET: 5; 325 TABLET ORAL at 19:51

## 2018-11-22 RX ADMIN — OXYCODONE AND ACETAMINOPHEN 1 TABLET: 5; 325 TABLET ORAL at 10:48

## 2018-11-22 RX ADMIN — PANTOPRAZOLE SODIUM 40 MG: 40 TABLET, DELAYED RELEASE ORAL at 08:18

## 2018-11-22 RX ADMIN — SEVELAMER CARBONATE 2400 MG: 800 TABLET, FILM COATED ORAL at 17:23

## 2018-11-22 RX ADMIN — ERYTHROPOIETIN 14000 UNITS: 10000 INJECTION, SOLUTION INTRAVENOUS; SUBCUTANEOUS at 17:36

## 2018-11-22 RX ADMIN — ACETAMINOPHEN 650 MG: 325 TABLET ORAL at 08:17

## 2018-11-22 RX ADMIN — CALCITRIOL 0.5 MCG: 0.25 CAPSULE ORAL at 08:17

## 2018-11-22 RX ADMIN — SEVELAMER CARBONATE 2400 MG: 800 TABLET, FILM COATED ORAL at 12:39

## 2018-11-22 RX ADMIN — WARFARIN SODIUM 2.5 MG: 2.5 TABLET ORAL at 17:23

## 2018-11-22 RX ADMIN — Medication 10 ML: at 15:09

## 2018-11-22 RX ADMIN — LEVETIRACETAM 750 MG: 500 TABLET ORAL at 08:17

## 2018-11-22 RX ADMIN — ATORVASTATIN CALCIUM 20 MG: 20 TABLET, FILM COATED ORAL at 08:17

## 2018-11-22 RX ADMIN — LACOSAMIDE 50 MG: 50 TABLET, FILM COATED ORAL at 08:17

## 2018-11-22 RX ADMIN — Medication 10 ML: at 21:33

## 2018-11-22 RX ADMIN — LACOSAMIDE 50 MG: 50 TABLET, FILM COATED ORAL at 17:23

## 2018-11-22 NOTE — PROGRESS NOTES
Bedside and Verbal shift change report given to [address] [place] [address] (oncoming nurse) by Gokul Monk (offgoing nurse). Report included the following information SBAR, Kardex, Intake/Output and MAR.

## 2018-11-22 NOTE — PROGRESS NOTES
Spiritual Care Assessment/Progress Note [address] 
 
 
NAME: Bart Lezama      MRN: [id number] AGE: 41 y.o. SEX: male Yazidi Affiliation: Anahy Hagan Language: English  
 
11/22/2018     Total Time (in minutes): 11 Spiritual Assessment begun in [facility] 3 MED TELE through conversation with: 
  
    [x]Patient        [] Family    [] Friend(s) Reason for Consult: Initial/Spiritual assessment, patient floor Spiritual beliefs: (Please include comment if needed) [x] Identifies with a brown tradition:  Anahy Hagan   
   [] Supported by a brown community:        
   [] Claims no spiritual orientation:       
   [] Seeking spiritual identity:            
   [] Adheres to an individual form of spirituality:       
   [] Not able to assess:                   
 
    
Identified resources for coping:  
   [] Prayer                           
   [] Music                  [] Guided Imagery [x] Family/friends                 [] Pet visits [] Devotional reading                         [] Unknown 
   [] Other:                                      
 
 
Interventions offered during this visit: (See comments for more details) Patient Interventions: Coping skills reviewed/reinforced, Affirmation of emotions/emotional suffering, Iconic (affirming the presence of God/Higher Power) Plan of Care: 
 
 [x] Support spiritual and/or cultural needs  
 [] Support AMD and/or advance care planning process    
 [] Support grieving process 
 [] Coordinate Rites and/or Rituals  
 [] Coordination with community clergy [] No spiritual needs identified at this time 
 [] Detailed Plan of Care below (See Comments)  [] Make referral to Music Therapy 
[] Make referral to Pet Therapy    
[] Make referral to Addiction services 
[] Make referral to [facility] 
[] Make referral to Spiritual Care Partner 
[] No future visits requested       
[] Follow up visits as needed Comments: Patient sitting in chair at bedside, watching television and using laptop. Good eye contact, good natured. Says he is feeling better. Spoke briefly about this hospitalization and what it has been like dealing with his health and dialysis. Appeared comforted as a result of this visit and expressed gratitude for this visit. Visited by Rev. Mica Julian, [facility]  paging service: [phone])

## 2018-11-22 NOTE — PROGRESS NOTES
Infectious Disease Progress Note IMPRESSION:  
1. Sepsis , s/p fever , tachycardia temp on admission 102.7, now resolved 2. Infected R/ femoral line with discharge around line ( WC not taken ) 3. BC no growth ( , ) 4. H/o MSSA bacteremia line related , BC + 2017. Pt states access was in RUE - AVG 5. ESRD on HD , h/o failed renal transplant x 2 ,  Functioning AV fistula + 6. Left lower pole lesion hemorrhagic rather than neoplastic on Ct abd/pelvis 7. Seizure disorder PLAN:  
1. May  Dc on Vancomycin 500 mg iv TIW  / Gentamicin 68 mg ( 1mg /kg) TIW  to be administered after  HD . End date  coverage for gram positives & negative organisms in view of femoral line infection 2. Awaiting ECHO cardiogram 
3. Weekly CBC , CMP, Vanc & Gentamicin troughs- fax reports to my office at [phone] , call with abnormal results [phone] CT abdomen / pelvis IMPRESSION: Hyperdense 2.6 x 3.0 cm left lower pole cyst demonstrating no 
appreciable enhancement through the various phases of contrast indicating that 
this is a hemorrhagic cyst rather than a neoplastic lesion. Subjective:  
 
Pt seen . No complaints . Remains afebrile Review of Systems:  A comprehensive review of systems was negative. Objective:  
 
Blood pressure (!) 89/55, pulse 97, temperature 98.3 °F (36.8 °C), resp. rate 16, height 5' 7\" (1.702 m), weight 151 lb 0.2 oz (68.5 kg), SpO2 97 %. Temp (24hrs), Av.1 °F (36.7 °C), Min:97.5 °F (36.4 °C), Max:98.3 °F (36.8 °C) Patient Vitals for the past 24 hrs: 
 Temp Pulse Resp BP SpO2  
18 0717 98.3 °F (36.8 °C) 97 16 (!) 89/55 97 % 18 0346 98 °F (36.7 °C) 69 16 (!) 83/53 100 % 18 2301 98.2 °F (36.8 °C) 77 16 (!) 87/58 100 % 18 1945 98.1 °F (36.7 °C) 74 16 101/55 100 % 18 1506    94/48   
18 1500 98.3 °F (36.8 °C) 77 16 (!) 82/48 100 % 18 1222 98.2 °F (36.8 °C) 69 14 109/72 100 % 11/21/18 1115 97.5 °F (36.4 °C) 69 16 107/60   
11/21/18 1100  61 16 98/53   
11/21/18 1045  61 16 104/65   
11/21/18 1030  62 16 102/62   
11/21/18 1015  63 16 101/63   
11/21/18 1000  62 16 107/67   
11/21/18 0945  67 16 107/68   
11/21/18 0930  64 16 107/65   
11/21/18 0915  64 16 103/66   
11/21/18 0900  64 16 100/69  Lines:  Peripheral IV:    and AVF Physical Exam:  
General:  Alert, cooperative, Eyes:  Sclera anicteric. Pupils equally round and reactive to light. Mouth/Throat: Mucous membranes normal, oral pharynx clear Neck: Supple Lungs:    Reduced auscultation  bases CV:  Regular rate and rhythm,no murmur, click, rub or gallop Abdomen:   Soft, non-tender. bowel sounds normal. non-distended Extremities: No  Edema, R/femoral line examined , clean at this time Lymph nodes: Cervical and supraclavicular normal  
Lines/Devices:  Intact, no erythema, drainage or tenderness Data Review: CBC:  
Recent Labs  
  11/22/18 
0527 11/21/18 
0808 11/20/18 
0410 WBC 11.1 10.4 10.3 RBC 2.89* 2.76* 2.86* HGB 9.3* 8.9* 9.1*  
HCT 28.7* 27.0* 27.5*  
 180 176 GRANS 59 63 83* LYMPH 15 15 6*  
EOS 13* 12* 0  
 
CMP:  
Recent Labs  
  11/22/18 
0527 11/21/18 
0808 11/20/18 
0410 GLU 78 88 108* * 134* 136  
K 4.1 4.1 4.0  
 99 99 CO2 30 25 26 BUN 29* 46* 31* CREA 7.80* 11.30* 9.22* CA 8.8 8.5 9.2 AGAP 5 10 11 BUCR 4* 4* 3* AP 68  --   --   
TP 7.2  --   --   
ALB 2.9*  --   --   
GLOB 4.3*  --   --   
AGRAT 0.7*  --   --   
 
 
Studies:     
Lab Results Component Value Date/Time Culture result: NO GROWTH 3 DAYS 11/19/2018 03:35 PM  
 Culture result: NO GROWTH 5 DAYS 11/16/2018 08:32 PM  
 Culture result: NO GROWTH 5 DAYS 06/03/2017 05:16 AM  
 Culture result: (A) 06/01/2017 09:46 AM  
  STAPHYLOCOCCUS AUREUS 
ISOLATED FROM ALL 4 BOTTLES DRAWN. ..LAC AND LAC 1010 SITES  Culture result: (A) 06/01/2017 09:46 AM  
 PRELIMINARY REPORT OF 
GRAM POSITIVE COCCI 
IN CLUSTERS 
GROWING IN 2 OF 4 BOTTLES DRAWN 
CALLED TO AND READ BACK BY 
MS ALFREDA ADEN RN ON 6/1/17 AT 2212 [facility] 3119). MS 
  
  
 
XR Results (most recent): 
Results from Hospital Encounter encounter on 11/16/18 XR CHEST PORT Narrative INDICATION:  acute fever, vascular access infection COMPARISON: 6/1/2017 FINDINGS: Single AP portable view of the chest obtained at 2048 demonstrates a 
stable cardiomediastinal silhouette. The lungs are clear bilaterally. No osseous 
abnormalities are seen. There is a left-sided pacemaker device. Right arm 
vascular stents are noted. Impression IMPRESSION: No evidence of acute cardiopulmonary process. Patient Active Problem List  
Diagnosis Code  Kidney transplant  Coronary atherosclerosis of native coronary artery I25.10  Nausea & vomiting R11.2  Serum total bilirubin elevated R17  Abdominal pain R10.9  
 HTN (hypertension) I10  
 Anemia D64.9  
 S/P appendectomy Z90.49  
 High cholesterol E78.00  
 Other ill-defined conditions(799.89) R69  
 CAD (coronary artery disease) I25.10  Gastrointestinal disorder K92.9  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[address] D69.6  Peritonitis ([address] K65.9  Malnutrition ([address] E46  
 DVT (deep venous thrombosis) ([facility]) I82.409  S/P IVC filter [id number]  Arterial occlusion I70.90  
 S/p cadaver renal transplant Z94.0  AICD (automatic cardioverter/defibrillator) present Z95.810  
 ESRD (end stage renal disease) on dialysis ([facility]) N18.6, Z99.2  Dialysis patient ([address] Z99.2  Hyperkalemia E87.5  Congestive heart failure, unspecified I50.9  Hypoglycemia E16.2  Sepsis ([address] A41.9 ICD-10-CM ICD-9-CM 1. Sepsis, due to unspecified organism ([address] A41.9 038.9   
  995.91   
2. ESRD (end stage renal disease) on dialysis ([facility]) N18.6 585.6 Z99.2 V45.11   
3. Hypotension, unspecified hypotension type I95.9 458.9 4. Acute hypokalemia E87.6 276.8 5. Supratherapeutic INR R79.1 790.92 I have discussed the diagnosis with the patient and the intended plan as seen in the above orders. I have discussed medication side effects and warnings with the patient as well. Reviewed test results at length with patient Anti-infectives:  
 
 Vancomycin IV - 11/17 Zosyn IV - 11/17  Tucker Youssef MD FACP

## 2018-11-22 NOTE — CONSULTS
Consult    NAME: Teresa Mcclellan   :  1975   MRN:  [id number]     Date/Time:  2018 1:11 PM    Patient PCP: Jacqulynn Castleman, MD  ________________________________________________________________________     Assessment:     1. Femoral Line sepsis, noted to have run of NSVT  2. AWMI in  with PCI of LAD  3. iCM EF 25%  4. S/p ICD with hx of VT/VF requiring therapy   5. HTN  6. Dyslipidemia  7. PVD with lower ext bypass  8. DVT on chronic anticoagulation  9. ESRD on home HD Dr. Rosalie Granados  10. Hx of Heparin induced thrombocytopenia  11. Hx of non-comliance        Plan:   - No chest pain  - Euvolemic, but bp low  - NSVT on tele, ICD in place    - Cont warfarin  - cont ASA  - Restart toprol xl 12.5mg daily with hold parameters, when bp allows  - Cont statin          [x]        High complexity decision making was performed        Subjective:   CHIEF COMPLAINT: NSVT    HISTORY OF PRESENT ILLNESS:       41years old male from home with past medical history significant for AICD, coronary artery disease, small bowel obstruction, V. tach, end-stage renal disease, seizure, GERD, thrombocytopenia presented to the hospital complaining from fever associated with abdominal pain and back pain started today associated with chills patient denies any shortness of breath any cough, patient on home hemodialysis and patient dialyze himself  and  patient stated he does get right femoral dialysis catheter 3 weeks ago and he usually follow-up with nephrologist at [facility] patient stated he get femoral catheter because his fistula is not working  We were asked to admit for work up and evaluation of the above problems. No chest pain, no dyspnea, some edema, did not feel NSVT. We were asked to consult for work up and evaluation of the above problems.      Past Medical History:   Diagnosis Date    Abdominal hematoma     AICD (automatic cardioverter/defibrillator) present     CAD (coronary artery disease)     anterior MI s/p 2 stents  2007    Chronic abdominal pain     Chronic kidney disease     ESRD; Dialysis dependent.  Home [facility] does labs    DVT (deep venous thrombosis) ([address] 2001    DVT of popliteal vein ([address] 11/2011    not anticoagulated due to bleeding, IVC filter    Gallstone pancreatitis     Gastrointestinal disorder     acid reflux    Gastrointestinal disorder     peptic ulcer    Hemodialysis patient ([address]     High cholesterol     Hypertension     Kidney transplant     b/l kidneys 1995, 2001    Nephrotic syndrome     Other ill-defined conditions(799.89)     kidny transplant x2,  on dialysis    Other ill-defined conditions(799.89)     home dialysis    Other ill-defined conditions(799.89)     hx recurrent left leg DVT    Peritonitis ([address]     Seizures ([address] 2015    most recent- only had three in lifetime    Small bowel obstruction (HCC)     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    V-tach [facility])       Past Surgical History:   Procedure Laterality Date    CARDIAC SURG PROCEDURE UNLIST  2007    2 stents    HX APPENDECTOMY      HX CHOLECYSTECTOMY      HX OTHER SURGICAL      cholecystectomy    HX OTHER SURGICAL  11/2011    exploratory laparotomy    HX OTHER SURGICAL      fem-pop    HX OTHER SURGICAL  02/2013    right upper extremity fistula    HX PACEMAKER  6/2009    AICD    HX SMALL BOWEL RESECTION      HX TRANSPLANT  2001     Kidney    HX TRANSPLANT  1992    kidney    HX UROLOGICAL      2 kidney transplants    RI EDG US EXAM SURGICAL ALTER STOM DUODENUM/JEJUNUM  7/15/2011         RI ESOPHAGOGASTRODUODENOSCOPY TRANSORAL DIAGNOSTIC  5/24/2012         VASCULAR SURGERY PROCEDURE UNLIST  11/14/2017    Insertion AV graft right upper arm (bovine); ligation of AV fistula right arm     Allergies   Allergen Reactions    Shellfish Containing Products Swelling     Break out in rash, trouble breathing    Heparin Analogues Other (comments)     Positive history of HIT    Iodinated Contrast- Oral And Iv Dye Other (comments)     Because of renal disease. No rash or hives per patient report 1/14/2012      Meds:  See below  Social History     Tobacco Use    Smoking status: Never Smoker    Smokeless tobacco: Never Used   Substance Use Topics    Alcohol use: No      Family History   Problem Relation Age of Onset    COPD Mother     Lung Disease Mother     Cancer Father         stomach    Cancer Maternal Grandmother        REVIEW OF SYSTEMS:     []         Unable to obtain  ROS due to ---   [x]         Total of 12 systems reviewed as follows: Total of 12 systems reviewed as follows:       POSITIVE= Bold text  Negative = normal text  General:  fever, chills, sweats, generalized weakness, weight loss/gain,      loss of appetite   Eyes:    blurred vision, eye pain, loss of vision, double vision  ENT:    rhinorrhea, pharyngitis   Respiratory:   cough, sputum production, SOB, ZAMUDIO, wheezing, pleuritic pain   Cardiology:   chest pain, palpitations, orthopnea, PND, edema, syncope   Gastrointestinal:  abdominal pain , N/V, diarrhea, dysphagia, constipation, bleeding   Genitourinary:  frequency, urgency, dysuria, hematuria, incontinence   Muskuloskeletal :  arthralgia, myalgia, back pain  Hematology:  easy bruising, nose or gum bleeding, lymphadenopathy   Dermatological: rash, ulceration, pruritis, color change / jaundice  Endocrine:   hot flashes or polydipsia   Neurological:  headache, dizziness, confusion, focal weakness, paresthesia,     Speech difficulties, memory loss, gait difficulty  Psychological: Feelings of anxiety, depression, agitation    Objective:      Physical Exam:    Last 24hrs VS reviewed since prior progress note.  Most recent are:    Visit Vitals  BP (!) 85/50 (BP 1 Location: Left arm, BP Patient Position: At rest;Sitting)   Pulse 78   Temp 98.3 °F (36.8 °C)   Resp 18   Ht 5' 7\" (1.702 m)   Wt 68.5 kg (151 lb 0.2 oz)   SpO2 99% BMI 23.65 kg/m²       Intake/Output Summary (Last 24 hours) at 11/22/2018 1311  Last data filed at 11/22/2018 0347  Gross per 24 hour   Intake 1300 ml   Output    Net 1300 ml        General Appearance: Well developed, well nourished, alert & oriented x 3,    no acute distress. Ears/Nose/Mouth/Throat: Pupils equal and round, Hearing grossly normal.  Neck: Supple. JVP within normal limits. Carotids good upstrokes, with no bruit. Chest: Lungs clear to auscultation bilaterally. Cardiovascular: Regular rate and rhythm, S1S2 normal, no murmur, rubs, gallops. Abdomen: Soft, non-tender, bowel sounds are active. No organomegaly. Extremities: +1 edema bilaterally. Femoral pulses +2, Distal Pulses +1. Skin: Warm and dry. Neuro: CN II-XII grossly intact, Strength and sensation grossly intact. Data:      Prior to Admission medications    Medication Sig Start Date End Date Taking? Authorizing Provider   sevelamer carbonate (RENVELA) 800 mg tab tab Take 1,600 mg by mouth three (3) times daily (with meals). Only takes if eating a  meal   Yes Provider, Historical   warfarin (COUMADIN) 2.5 mg tablet Take 2.5 mg by mouth every Tuesday and Thursday. Yes Provider, Historical   warfarin (COUMADIN) 5 mg tablet Take 5 mg by mouth five (5) days a week. All days except Tuesday and Thursday, patient takes 2.5 mg   Yes Provider, Historical   metoprolol succinate (TOPROL-XL) 25 mg XL tablet Take 12.5 mg by mouth daily. Yes Provider, Historical   ondansetron (ZOFRAN ODT) 4 mg disintegrating tablet Take 1 Tab by mouth every eight (8) hours as needed for Nausea. 8/31/17  Yes Sarah Carlson MD   amLODIPine (NORVASC) 2.5 mg tablet Take 2.5 mg by mouth daily. Yes Other, MD Morro   omeprazole (PRILOSEC) 20 mg capsule Take 20 mg by mouth daily. Yes Provider, Historical   calcitRIOL (ROCALTROL) 0.5 mcg capsule Take 0.5 mcg by mouth daily.    Yes Provider, Historical   levETIRAcetam (KEPPRA) 750 mg tablet Take 750 mg by mouth two (2) times a day. Yes Vito, MD Morro   lacosamide (VIMPAT) 50 mg tab tablet Take 50 mg by mouth two (2) times a day. Yes Vito, MD Morro   atorvastatin (LIPITOR) 20 mg tablet Take 20 mg by mouth daily. Yes Vito, MD Morro   aspirin 81 mg chewable tablet Take 81 mg by mouth daily. Yes Vito, MD Morro   oxyCODONE-acetaminophen (PERCOCET) 5-325 mg per tablet Take 1-2 Tabs by mouth every four (4) hours as needed for Pain. Max Daily Amount: 12 Tabs. 11/14/17   Sydney Ambriz MD   HYDROcodone-acetaminophen [facility]) 5-325 mg per tablet Take 1 Tab by mouth every four (4) hours as needed for Pain. Max Daily Amount: 6 Tabs. 9/23/17   Sydni Vanegas MD       Recent Results (from the past 24 hour(s))   PROTHROMBIN TIME + INR    Collection Time: 11/21/18  4:12 PM   Result Value Ref Range    INR 1.9 (H) 0.9 - 1.1      Prothrombin time 18.5 (H) 9.0 - 11.1 sec   PROTHROMBIN TIME + INR    Collection Time: 11/22/18  5:27 AM   Result Value Ref Range    INR 2.1 (H) 0.9 - 1.1      Prothrombin time 20.6 (H) 9.0 - 11.1 sec   CBC WITH AUTOMATED DIFF    Collection Time: 11/22/18  5:27 AM   Result Value Ref Range    WBC 11.1 4.1 - 11.1 K/uL    RBC 2.89 (L) 4.10 - 5.70 M/uL    HGB 9.3 (L) 12.1 - 17.0 g/dL    HCT 28.7 (L) 36.6 - 50.3 %    MCV 99.3 (H) 80.0 - 99.0 FL    MCH 32.2 26.0 - 34.0 PG    MCHC 32.4 30.0 - 36.5 g/dL    RDW 15.7 (H) 11.5 - 14.5 %    PLATELET 532 860 - 077 K/uL    MPV 10.4 8.9 - 12.9 FL    NRBC 0.4 (H) 0  WBC    ABSOLUTE NRBC 0.04 (H) 0.00 - 0.01 K/uL    NEUTROPHILS 59 32 - 75 %    LYMPHOCYTES 15 12 - 49 %    MONOCYTES 11 5 - 13 %    EOSINOPHILS 13 (H) 0 - 7 %    BASOPHILS 1 0 - 1 %    IMMATURE GRANULOCYTES 1 (H) 0.0 - 0.5 %    ABS. NEUTROPHILS 6.6 1.8 - 8.0 K/UL    ABS. LYMPHOCYTES 1.7 0.8 - 3.5 K/UL    ABS. MONOCYTES 1.2 (H) 0.0 - 1.0 K/UL    ABS. EOSINOPHILS 1.5 (H) 0.0 - 0.4 K/UL    ABS. BASOPHILS 0.1 0.0 - 0.1 K/UL    ABS. IMM.  GRANS. 0.1 (H) 0.00 - 0.04 K/UL    DF AUTOMATED     METABOLIC PANEL, COMPREHENSIVE    Collection Time: 11/22/18  5:27 AM   Result Value Ref Range    Sodium 135 (L) 136 - 145 mmol/L    Potassium 4.1 3.5 - 5.1 mmol/L    Chloride 100 97 - 108 mmol/L    CO2 30 21 - 32 mmol/L    Anion gap 5 5 - 15 mmol/L    Glucose 78 65 - 100 mg/dL    BUN 29 (H) 6 - 20 MG/DL    Creatinine 7.80 (H) 0.70 - 1.30 MG/DL    BUN/Creatinine ratio 4 (L) 12 - 20      GFR est AA 9 (L) >60 ml/min/1.73m2    GFR est non-AA 8 (L) >60 ml/min/1.73m2    Calcium 8.8 8.5 - 10.1 MG/DL    Bilirubin, total 0.4 0.2 - 1.0 MG/DL    ALT (SGPT) 14 12 - 78 U/L    AST (SGOT) 12 (L) 15 - 37 U/L    Alk.  phosphatase 68 45 - 117 U/L    Protein, total 7.2 6.4 - 8.2 g/dL    Albumin 2.9 (L) 3.5 - 5.0 g/dL    Globulin 4.3 (H) 2.0 - 4.0 g/dL    A-G Ratio 0.7 (L) 1.1 - 2.2     IRON PROFILE    Collection Time: 11/22/18  5:27 AM   Result Value Ref Range    Iron 57 35 - 150 ug/dL    TIBC 234 (L) 250 - 450 ug/dL    Iron % saturation 24 20 - 50 %   PHOSPHORUS    Collection Time: 11/22/18  5:27 AM   Result Value Ref Range    Phosphorus 3.9 2.6 - 4.7 MG/DL

## 2018-11-22 NOTE — PROGRESS NOTES
Patient had 15 bts V-tach, patient is asymptomatic at this time with no complaints of shortness of breath, chest pain or dizziness. Dr. Mira Long notified, telephone order with read back for cardiac consult received. Patient states Dr. Sofi Horton is his cardiologist.  
Cardiology consult called Dr. Sofi Horton is on call today.

## 2018-11-22 NOTE — PROGRESS NOTES
Pharmacy Daily Dosing of Warfarin Indication: Hx of DVT, Hx of HIT Goal INR: 2-3 PTA Warfarin Dose: 2.5 mg every Tuesday and Thursday, 5 mg every other day of the week Concurrent anticoagulants:  
Concurrent antiplatelet: ASA 81 mg po Daily Major Interacting Medications ? Drugs that may increase INR:  
? Drugs that may decrease INR:  
 
Conditions that may increase/decrease INR (CHF, C. diff, cirrhosis, thyroid disorder, hypoalbuminemia):  
 
Labs: 
Recent Labs  
  11/22/18 
0527 11/21/18 
1612 11/21/18 
0808 11/20/18 
0410 INR 2.1* 1.9*  --  2.4* HGB 9.3*  --  8.9* 9.1*  
  --  180 176 SGOT 12*  --   --   --   
TBILI 0.4  --   --   --   
ALB 2.9*  --   --   --   
 
 
Impression/Plan: · Warfarin 2.5 mg tonight. · Daily INR · CBC w/o diff QOD Pharmacy will follow daily and adjust the dose as appropriate. Thanks Freida Bar, PHARMD 
 
 
http://[facility]/[place]/[facility]/Pharmacy/Clinical%20Companion/Warfarin%20Dosing%20Protocol. pdf

## 2018-11-22 NOTE — PROGRESS NOTES
Problem: Falls - Risk of 
Goal: *Absence of Falls Document Kun Kins Fall Risk and appropriate interventions in the flowsheet. Outcome: Progressing Towards Goal 
Fall Risk Interventions: 
  
 
  
 
Medication Interventions: Teach patient to arise slowly

## 2018-11-22 NOTE — PROGRESS NOTES
Bedside shift change report given to Lyly (oncoming nurse) by Maryl Castleman (offgoing nurse). Report included the following information SBAR, Kardex, Intake/Output, MAR and Recent Results Pt had dialysis today.

## 2018-11-22 NOTE — PROGRESS NOTES
Hospitalist Progress Note NAME: Beverley Shane :  1975 MRN:  [id number] Assessment / Plan: 
Sepsis secondary to suspected infected HD femoral cath 
AICD, waiting echocardiogram report. Continue current abx as per ID Will be discharged with Vancomycin 500 mg iv TIW  / Gentamicin 68 mg ( 1mg /kg) TIW  to be administered after  HD . End date  Follow-up blood culture-NGTD Vascular surgery appreciated 
appreciated ID and renal eval 
End-stage renal disease Continue hemodialysis as per nephrology Borderline low BP noted. and 15 beats of Vtach,cardioconsulted , asymptomatic History of DVT Continue coumadin and follow up INR Seizure We will continue home seizure medication Coronary artery disease AICD We will continue home medication Lipitor Peripheral vascular disease 
on hold Plavix aspirin Abdominal pain/back pain Resolved Code Status: full code Surrogate Decision Maker:Brigette Mota, Sister  
DVT Prophylaxis: Coumadin GI Prophylaxis: not indicated Baseline: independent 18.5 - 24.9 Normal weight / Body mass index is 23.65 kg/m². Recommended Disposition: Home w/Family ?home health vs outpt HD chair, he has home HD. Subjective: Chief Complaint / Reason for Physician Visit Patient did not have complaints in my visit Discussed with RN events overnight. Review of Systems: 
Symptom Y/N Comments  Symptom Y/N Comments Fever/Chills    Chest Pain n   
Poor Appetite n   Edema Cough    Abdominal Pain y Sputum    Joint Pain SOB/ZAMUDIO n   Pruritis/Rash Nausea/vomit n   Tolerating PT/OT Diarrhea    Tolerating Diet y Constipation    Other Could NOT obtain due to:   
 
Objective: VITALS:  
Last 24hrs VS reviewed since prior progress note. Most recent are: 
Patient Vitals for the past 24 hrs: 
 Temp Pulse Resp BP SpO2  
18 0717 98.3 °F (36.8 °C) 97 16 (!) 89/55 97 % 18 0346 98 °F (36.7 °C) 69 16 (!) 83/53 100 % 11/21/18 2301 98.2 °F (36.8 °C) 77 16 (!) 87/58 100 % 11/21/18 1945 98.1 °F (36.7 °C) 74 16 101/55 100 % 11/21/18 1506    94/48   
11/21/18 1500 98.3 °F (36.8 °C) 77 16 (!) 82/48 100 % 11/21/18 1222 98.2 °F (36.8 °C) 69 14 109/72 100 % 11/21/18 1115 97.5 °F (36.4 °C) 69 16 107/60   
11/21/18 1100  61 16 98/53   
11/21/18 1045  61 16 104/65  Intake/Output Summary (Last 24 hours) at 11/22/2018 1034 Last data filed at 11/22/2018 5069 Gross per 24 hour Intake 1300 ml Output 3000 ml Net -1700 ml PHYSICAL EXAM: 
Patient is awake and alert oriented x3  
sitting in chair no distress on room air. Cardiovascular regular rate with a systolic murmur no rubs or gallops. Lungs are clear no wheezes rhonchi or crackles. Abdomen bowel sounds are present soft some tenderness in the left lower quadrant no rebound or guarding and tenderness in the left flank to palpation, mild Extremities-some tight edema no clubbing or cyanosis Reviewed most current lab test results and cultures  YES Reviewed most current radiology test results   YES Review and summation of old records today    NO Reviewed patient's current orders and MAR    YES 
PMH/ reviewed - no change compared to H&P 
________________________________________________________________________ Care Plan discussed with: 
  Comments Patient y Family RN y   
Care Manager y Consultant  y Renal  
                  Multidiciplinary team rounds were held today with , nursing, pharmacist and clinical coordinator. Patient's plan of care was discussed; medications were reviewed and discharge planning was addressed. ________________________________________________________________________ Total NON critical care TIME:  30 Minutes Total CRITICAL CARE TIME Spent:   Minutes non procedure based Comments >50% of visit spent in counseling and coordination of care ________________________________________________________________________ Anisa Vanegas MD  
 
Procedures: see electronic medical records for all procedures/Xrays and details which were not copied into this note but were reviewed prior to creation of Plan. LABS: 
I reviewed today's most current labs and imaging studies. Pertinent labs include: 
Recent Labs  
  11/22/18 0527 11/21/18 
0808 11/20/18 
0410 WBC 11.1 10.4 10.3 HGB 9.3* 8.9* 9.1*  
HCT 28.7* 27.0* 27.5*  
 180 176 Recent Labs  
  11/22/18 0527 11/21/18 
1612 11/21/18 
9468 11/20/18 
0410 *  --  134* 136  
K 4.1  --  4.1 4.0  
  --  99 99 CO2 30  --  25 26 GLU 78  --  88 108* BUN 29*  --  46* 31* CREA 7.80*  --  11.30* 9.22* CA 8.8  --  8.5 9.2 MG  --   --   --  2.5* PHOS 3.9  --   --   --   
ALB 2.9*  --   --   --   
TBILI 0.4  --   --   --   
SGOT 12*  --   --   --   
ALT 14  --   --   --   
INR 2.1* 1.9*  --  2.4* Signed:  Anisa Vanegas MD

## 2018-11-23 VITALS
RESPIRATION RATE: 18 BRPM | HEIGHT: 67 IN | BODY MASS INDEX: 23.94 KG/M2 | WEIGHT: 152.56 LBS | OXYGEN SATURATION: 100 % | SYSTOLIC BLOOD PRESSURE: 101 MMHG | HEART RATE: 76 BPM | DIASTOLIC BLOOD PRESSURE: 66 MMHG | TEMPERATURE: 97.7 F

## 2018-11-23 LAB
ALBUMIN SERPL-MCNC: 2.8 G/DL (ref 3.5–5)
ALBUMIN SERPL-MCNC: 2.8 G/DL (ref 3.5–5)
ALBUMIN/GLOB SERPL: 0.7 {RATIO} (ref 1.1–2.2)
ALP SERPL-CCNC: 75 U/L (ref 45–117)
ALT SERPL-CCNC: 13 U/L (ref 12–78)
ANION GAP SERPL CALC-SCNC: 10 MMOL/L (ref 5–15)
ANION GAP SERPL CALC-SCNC: 10 MMOL/L (ref 5–15)
AST SERPL-CCNC: 12 U/L (ref 15–37)
BASOPHILS # BLD: 0.1 K/UL (ref 0–0.1)
BASOPHILS NFR BLD: 1 % (ref 0–1)
BILIRUB SERPL-MCNC: 0.4 MG/DL (ref 0.2–1)
BUN SERPL-MCNC: 36 MG/DL (ref 6–20)
BUN SERPL-MCNC: 36 MG/DL (ref 6–20)
BUN/CREAT SERPL: 3 (ref 12–20)
BUN/CREAT SERPL: 4 (ref 12–20)
CALCIUM SERPL-MCNC: 8.8 MG/DL (ref 8.5–10.1)
CALCIUM SERPL-MCNC: 8.9 MG/DL (ref 8.5–10.1)
CHLORIDE SERPL-SCNC: 98 MMOL/L (ref 97–108)
CHLORIDE SERPL-SCNC: 98 MMOL/L (ref 97–108)
CO2 SERPL-SCNC: 27 MMOL/L (ref 21–32)
CO2 SERPL-SCNC: 28 MMOL/L (ref 21–32)
COMMENT, HOLDF: NORMAL
CREAT SERPL-MCNC: 10.2 MG/DL (ref 0.7–1.3)
CREAT SERPL-MCNC: 10.6 MG/DL (ref 0.7–1.3)
DIFFERENTIAL METHOD BLD: ABNORMAL
EOSINOPHIL # BLD: 1.6 K/UL (ref 0–0.4)
EOSINOPHIL NFR BLD: 15 % (ref 0–7)
ERYTHROCYTE [DISTWIDTH] IN BLOOD BY AUTOMATED COUNT: 15.8 % (ref 11.5–14.5)
GLOBULIN SER CALC-MCNC: 4.3 G/DL (ref 2–4)
GLUCOSE SERPL-MCNC: 90 MG/DL (ref 65–100)
GLUCOSE SERPL-MCNC: 91 MG/DL (ref 65–100)
HCT VFR BLD AUTO: 28.9 % (ref 36.6–50.3)
HGB BLD-MCNC: 9.4 G/DL (ref 12.1–17)
IMM GRANULOCYTES # BLD: 0 K/UL (ref 0–0.04)
IMM GRANULOCYTES NFR BLD AUTO: 0 % (ref 0–0.5)
INR PPP: 2.3 (ref 0.9–1.1)
LYMPHOCYTES # BLD: 1.7 K/UL (ref 0.8–3.5)
LYMPHOCYTES NFR BLD: 16 % (ref 12–49)
MCH RBC QN AUTO: 32.2 PG (ref 26–34)
MCHC RBC AUTO-ENTMCNC: 32.5 G/DL (ref 30–36.5)
MCV RBC AUTO: 99 FL (ref 80–99)
MONOCYTES # BLD: 1.2 K/UL (ref 0–1)
MONOCYTES NFR BLD: 11 % (ref 5–13)
NEUTS SEG # BLD: 6.2 K/UL (ref 1.8–8)
NEUTS SEG NFR BLD: 57 % (ref 32–75)
NRBC # BLD: 0.02 K/UL (ref 0–0.01)
NRBC BLD-RTO: 0.2 PER 100 WBC
PHOSPHATE SERPL-MCNC: 4 MG/DL (ref 2.6–4.7)
PLATELET # BLD AUTO: 250 K/UL (ref 150–400)
PMV BLD AUTO: 10.4 FL (ref 8.9–12.9)
POTASSIUM SERPL-SCNC: 4.2 MMOL/L (ref 3.5–5.1)
POTASSIUM SERPL-SCNC: 4.2 MMOL/L (ref 3.5–5.1)
PROT SERPL-MCNC: 7.1 G/DL (ref 6.4–8.2)
PROTHROMBIN TIME: 22.7 SEC (ref 9–11.1)
RBC # BLD AUTO: 2.92 M/UL (ref 4.1–5.7)
RBC MORPH BLD: ABNORMAL
SAMPLES BEING HELD,HOLD: NORMAL
SODIUM SERPL-SCNC: 135 MMOL/L (ref 136–145)
SODIUM SERPL-SCNC: 136 MMOL/L (ref 136–145)
WBC # BLD AUTO: 10.8 K/UL (ref 4.1–11.1)
WBC MORPH BLD: ABNORMAL

## 2018-11-23 PROCEDURE — 94760 N-INVAS EAR/PLS OXIMETRY 1: CPT

## 2018-11-23 PROCEDURE — 85025 COMPLETE CBC W/AUTO DIFF WBC: CPT

## 2018-11-23 PROCEDURE — 5A1D70Z PERFORMANCE OF URINARY FILTRATION, INTERMITTENT, LESS THAN 6 HOURS PER DAY: ICD-10-PCS | Performed by: INTERNAL MEDICINE

## 2018-11-23 PROCEDURE — 74011250637 HC RX REV CODE- 250/637: Performed by: INTERNAL MEDICINE

## 2018-11-23 PROCEDURE — 74011250637 HC RX REV CODE- 250/637: Performed by: EMERGENCY MEDICINE

## 2018-11-23 PROCEDURE — 80069 RENAL FUNCTION PANEL: CPT

## 2018-11-23 PROCEDURE — 93306 TTE W/DOPPLER COMPLETE: CPT

## 2018-11-23 PROCEDURE — 80053 COMPREHEN METABOLIC PANEL: CPT

## 2018-11-23 PROCEDURE — 36415 COLL VENOUS BLD VENIPUNCTURE: CPT

## 2018-11-23 PROCEDURE — 74011250636 HC RX REV CODE- 250/636: Performed by: INTERNAL MEDICINE

## 2018-11-23 PROCEDURE — 85610 PROTHROMBIN TIME: CPT

## 2018-11-23 PROCEDURE — 74011000258 HC RX REV CODE- 258: Performed by: INTERNAL MEDICINE

## 2018-11-23 PROCEDURE — 90935 HEMODIALYSIS ONE EVALUATION: CPT

## 2018-11-23 RX ORDER — SEVELAMER CARBONATE 800 MG/1
2400 TABLET, FILM COATED ORAL
Qty: 90 TAB | Refills: 0 | Status: SHIPPED | OUTPATIENT
Start: 2018-11-23 | End: 2019-01-16

## 2018-11-23 RX ORDER — GENTAMICIN SULFATE 100 MG/50ML
100 INJECTION, SOLUTION INTRAVENOUS ONCE
Qty: 50 ML | Refills: 0 | Status: SHIPPED
Start: 2018-11-23 | End: 2018-11-23

## 2018-11-23 RX ORDER — WARFARIN 2 MG/1
4 TABLET ORAL ONCE
Status: DISCONTINUED | OUTPATIENT
Start: 2018-11-23 | End: 2018-11-23 | Stop reason: HOSPADM

## 2018-11-23 RX ORDER — GENTAMICIN SULFATE 100 MG/50ML
100 INJECTION, SOLUTION INTRAVENOUS ONCE
Status: COMPLETED | OUTPATIENT
Start: 2018-11-23 | End: 2018-11-23

## 2018-11-23 RX ADMIN — OXYCODONE AND ACETAMINOPHEN 1 TABLET: 5; 325 TABLET ORAL at 06:58

## 2018-11-23 RX ADMIN — LEVETIRACETAM 750 MG: 500 TABLET ORAL at 12:40

## 2018-11-23 RX ADMIN — SEVELAMER CARBONATE 2400 MG: 800 TABLET, FILM COATED ORAL at 12:41

## 2018-11-23 RX ADMIN — PANTOPRAZOLE SODIUM 40 MG: 40 TABLET, DELAYED RELEASE ORAL at 12:42

## 2018-11-23 RX ADMIN — CALCITRIOL 0.5 MCG: 0.25 CAPSULE ORAL at 12:42

## 2018-11-23 RX ADMIN — LACOSAMIDE 50 MG: 50 TABLET, FILM COATED ORAL at 12:41

## 2018-11-23 RX ADMIN — GENTAMICIN SULFATE 100 MG: 100 INJECTION, SOLUTION INTRAVENOUS at 13:48

## 2018-11-23 RX ADMIN — SODIUM CHLORIDE 500 MG: 900 INJECTION, SOLUTION INTRAVENOUS at 09:29

## 2018-11-23 RX ADMIN — ATORVASTATIN CALCIUM 20 MG: 20 TABLET, FILM COATED ORAL at 12:42

## 2018-11-23 RX ADMIN — OXYCODONE AND ACETAMINOPHEN 1 TABLET: 5; 325 TABLET ORAL at 13:29

## 2018-11-23 RX ADMIN — METOPROLOL SUCCINATE 12.5 MG: 25 TABLET, EXTENDED RELEASE ORAL at 12:43

## 2018-11-23 RX ADMIN — ASPIRIN 81 MG 81 MG: 81 TABLET ORAL at 12:43

## 2018-11-23 NOTE — PROGRESS NOTES
Bedside shift change report given to Armand Luis (oncoming nurse) by Luis Fernando Lewis (offgoing nurse). Report included the following information SBAR, Kardex, Intake/Output and Recent Results. 0700: TRANSFER - OUT REPORT: 
 
Verbal report given to Dialysis RN(name) on [place]  being transferred to Dialysis(unit) for ordered procedure Report consisted of patients Situation, Background, Assessment and  
Recommendations(SBAR). Information from the following report(s) SBAR, Kardex and Recent Results was reviewed with the receiving nurse. Lines:  
Peripheral IV 11/20/18 Anterior; Left Wrist (Active) Site Assessment Clean, dry, & intact 11/22/2018 10:53 PM  
Phlebitis Assessment 0 11/22/2018 10:53 PM  
Infiltration Assessment 0 11/22/2018 10:53 PM  
Dressing Status Clean, dry, & intact 11/22/2018 10:53 PM  
Dressing Type Tape;Transparent 11/22/2018 10:53 PM  
Hub Color/Line Status Blue;Flushed 11/22/2018 10:53 PM  
Action Taken Open ports on tubing capped 11/22/2018 10:53 PM  
Alcohol Cap Used Yes 11/22/2018 10:53 PM  
  
 
Opportunity for questions and clarification was provided. Patient transported with: 
 Monitor Tech  
 
0715: Bedside shift change report given to Randolph Lane (oncoming nurse) by Jorge Justice RN (offgoing nurse). Report included the following information SBAR, Kardex, Intake/Output and Recent Results.

## 2018-11-23 NOTE — PROGRESS NOTES
Pharmacy Automatic Renal Dosing Protocol - Antimicrobials Indication for Antimicrobials: Sepsis; Bacteremia  in setting of HD (at home) Current Regimen of Each Antimicrobial: 
Vancomycin 1.5gm IV x1, then 750mg IV after each HD (Start Date ; Day # 8) Gentamicin 2 mg/kg x 1, followed by 1.5 mg/kg after each HD (Start ; Day # 3) Previous Antimicrobial Therapy: 
Piperacillin-tazobactam 3.375 gm IV q12h (Start Date ; Day # 5) Goal Level: VANCOMYCIN TROUGH GOAL RANGE Vancomycin Trough: 15 - 20 mcg/mL Date Dose & Interval Measured (mcg/mL) Extrapolated (mcg/mL)  
 750 mg pHD 22.4 Significant Cultures:  
: Blood = NG x 5 days (Final) 
:  Blood = NG x 3 days (Prelim) Radiology / Imaging results: (X-ray, CT scan or MRI):  
: CT Abd: Increased density of lower pole left renal cyst likely reflects 
interval hemorrhage into cyst which could indicate underlying neoplasm though 
this is not clearly seen. There are otherwise no acute intra-abdominal findings 
with incidental findings as above. Paralysis, amputations, malnutrition: none noted Labs: 
Recent Labs  
  18 
3376 18 
0410 18 
1247 CREA 11.30* 9.22* 13.80* BUN 46* 31* 46* WBC 10.4 10.3 6.3 Temp (24hrs), Av.9 °F (36.6 °C), Min:97.6 °F (36.4 °C), Max:98.1 °F (36.7 °C) Creatinine Clearance (mL/min) or Dialysis: ESRD on HD (MWF?) Impression/Plan: · Dialysis today. Vanco given, gentamicin not given. Will ask floor nurse to hang gentamicin. · Check vanco and gentamicin troughs on . · Gentamicin started in place of Zosyn, see dosing above for HD · Antimicrobial stop date: End date  Pharmacy will follow daily and adjust medications as appropriate for renal function and/or serum levels. Thank you, Chidi Warner, PHARMD 
 
Recommended duration of therapy 
http://[facility]/[place]/[facility]/Pharmacy/Clinical%20Compa nion/Duration%20of%20ABX%20therapy. docx Renal Dosing 
http://[facility]/[place]/[facility]/Pharmacy/Clinical%20Companion/Renal%[email or web address]. pdf

## 2018-11-23 NOTE — PROGRESS NOTES
Nephrology Progress Note Blaise Leblanc  
 
[facility]. [facility]                  Phone - [phone] Patient: Antoinette Jack YOB: 1975     Admit Date: 11/16/2018 Date- 11/23/2018 CC: Follow up for ESRD Subjective: Interval History: - Seen on dialysis. Tolerated well. For ECHO after HD today. Had vtach overnight. BP stable on HD.   
 
ROS: no HEENT complaints. No fever/chills. No chest pain. No abd pain. No joint pain. No skin rashes. Assessment: 
· esrd- Home HD 5 days/ week, followed by Dr. Janey Martell. On a MWF schedule here in the hospital. 
 
· Anemia of CKD · Secondary hyperparathyroidism of renal origin · Sepsis · H/o hypertension · Left renal cyst with hemorrhage · Plan:  
 
HD now ECHO today IV vanco with HD - CM working on outpt abx Cont THAO with dialysis Next HD Monday if here Cont vit D and binders Physical Exam:  
General: Middle-aged man in no distress Resp:  Lungs: diminished breath sounds at the bases; no rales or wheezes CV:  RRR; no gallop or rub GI:  Soft and non-tender Neurologic:  Alert and oriented X 3. Non Focal 
Skin:  no rashes or ulcers. No jaundice Extrem:           Trace edema Psych:              Normal affect and mood Right arm av graft + Care Plan discussed with: patient and dialysis RN. Objective:  
Patient Vitals for the past 24 hrs: 
 Temp Pulse Resp BP SpO2  
11/23/18 1045  76 18 101/66   
11/23/18 1041 97.7 °F (36.5 °C) 79 18 98/66   
11/23/18 1030  63 18 92/62   
11/23/18 1015  63 18 95/54   
11/23/18 1000  79 18 100/71   
11/23/18 0945  63 18 96/56   
11/23/18 0930  64 18 98/60   
11/23/18 0915  67 18 110/62   
11/23/18 0900  71 18 100/60   
11/23/18 0845  78 18 105/66   
11/23/18 0830  73 18 110/70   
11/23/18 0815  74 18 101/63   
11/23/18 0800  70 18 103/64   
11/23/18 0736 97.9 °F (36.6 °C) 77 18 113/70  11/23/18 0235 98.2 °F (36.8 °C) 76 18 100/61 100 % 11/22/18 2253 98.4 °F (36.9 °C) 82 18 96/67 100 % 11/22/18 1900 98.7 °F (37.1 °C) 85 17 100/55 100 % 11/22/18 1602 98.2 °F (36.8 °C) 72 16 99/59 98 % Last 3 Recorded Weights in this Encounter 11/20/18 7027 11/21/18 0423 11/23/18 0235 Weight: 67.5 kg (148 lb 13 oz) 68.5 kg (151 lb 0.2 oz) 69.2 kg (152 lb 8.9 oz)  
 
11/21 1901 - 11/23 0700 In: 1520 [P.O.:1520] Out: - Chart reviewed. Pertinent Notes reviewed. Medication list  reviewed Current Facility-Administered Medications Medication  gentamicin in saline (GARAMYCIN) infusion 100 mg  
 metoprolol succinate (TOPROL-XL) XL tablet 12.5 mg  
 epoetin emilie (EPOGEN;PROCRIT) 14,000 Units  vancomycin (VANCOCIN) 500 mg in 0.9% sodium chloride 100 mL IVPB And  
 VANCOMYCIN INFORMATION NOTE  gentamicin in saline (GARAMYCIN) infusion 100 mg  aspirin chewable tablet 81 mg  
 oxyCODONE-acetaminophen (PERCOCET) 5-325 mg per tablet 1 Tab  WARFARIN INFORMATION NOTE (COUMADIN)  sodium chloride (NS) flush 5-10 mL  sodium chloride (NS) flush 5-10 mL  acetaminophen (TYLENOL) tablet 650 mg  
 ondansetron (ZOFRAN) injection 4 mg  naloxone (NARCAN) injection 0.1 mg  
 atorvastatin (LIPITOR) tablet 20 mg  
 calcitRIOL (ROCALTROL) capsule 0.5 mcg  lacosamide (VIMPAT) tablet 50 mg  levETIRAcetam (KEPPRA) tablet 750 mg  
 pantoprazole (PROTONIX) tablet 40 mg  
 sevelamer carbonate (RENVELA) tab 2,400 mg Data Review : 
Recent Labs  
  11/23/18 
0735 11/22/18 
0527 11/21/18 
1612 11/21/18 
7220 WBC 10.8 11.1  --  10.4 HGB 9.4* 9.3*  --  8.9*  
 199  --  180 ANEU 6.2 6.6  --  6.6 INR 2.3* 2.1* 1.9*  --   
*  136 135*  --  134* K 4.2  4.2 4.1  --  4.1 GLU 91  90 78  --  88 BUN 36*  36* 29*  --  46* CREA 10.60*  10.20* 7.80*  --  11.30* ALT 13 14  --   --   
SGOT 12* 12*  --   --   
TBILI 0.4 0.4  --   --   
 AP 75 68  --   --   
CA 8.8  8.9 8.8  --  8.5 PHOS 4.0 3.9  --   --   
 
Lab Results Component Value Date/Time Culture result: NO GROWTH 4 DAYS 11/19/2018 03:35 PM  
 Culture result: NO GROWTH 5 DAYS 11/16/2018 08:32 PM  
 Culture result: NO GROWTH 5 DAYS 06/03/2017 05:16 AM  
 
Lab Results Component Value Date/Time Specimen Description: NARES 05/30/2013 05:53 PM  
 Specimen Description: URINE 11/16/2012 05:15 PM  
 Specimen Description: URINE 09/17/2012 01:35 AM  
 
Recent Labs  
  11/22/18 
3329 TIBC 234* PSAT 24 Lab Results Component Value Date/Time Sodium,urine random 115 07/14/2011 07:20 PM  
 Creatinine, urine 52.2 07/14/2011 07:20 PM  
 
 
 
 
Eleazar Richard MD 
[facility] 
 [email or web address] [place] / [employer] [address] [place], [address] [place] Phone - [phone] Fax - [phone]

## 2018-11-23 NOTE — PROGRESS NOTES
[facility] is unsure pt can receive abx at home thru HD catheter. If pt needs to go to a OP HD unit, will ask nephrologist what unit//days//times. Transportation may be an issue. Doubt unit will accept until next week. I've ecin'd order to HCP to assess if they can accept. I've asked [address] if they can follow for nursing. 11a  Dr Robert Whitman and Dr Light Lowers clarified abx are 3 times a week to be given at a dialysis center. She will contact Dr Chemo Negrete regarding what center and if they can accept the pt. And let me know 1pm  Pt will receive the abx at [facility] HD center [facility] next week at 2:55 pm.  I spoke with Joan Rinaldi at [phone]//fax [phone]. I faxed her the order. They will give the abx after the HD treatment thru the venous port of HD catheter so won't need IV access. Dr Yanci Epps. is aware. [id number]  I spoke with pt who is in agreement with the above.

## 2018-11-23 NOTE — PROGRESS NOTES
Progress Note 
 
 
11/23/2018 11:51 AM 
NAME: Teresa Mcclellan MRN:  [id number] Admit Diagnosis: Sepsis ([address] Assessment:   
  
1. Femoral Line sepsis, noted to have run of NSVT 2. AWMI in 2008 with PCI of LAD 3. iCM EF 25%, echo 11/2018 Ef 40% 4. S/p ICD with hx of VT/VF requiring therapy 2016 
5. HTN 
6. Dyslipidemia 7. PVD with lower ext bypass 8. DVT on chronic anticoagulation 9. ESRD on home HD Dr. Rosalie Granados 10. Hx of Heparin induced thrombocytopenia 11. Hx of non-comliance 
   
  
            Plan: - No chest pain - Euvolemic, but bp low 
- NSVT on tele, ICD in place 
  
- Cont warfarin 
- cont ASA - Cont resumed toprol xl 12.5mg daily with hold parameters - Cont statin 
  
Stable from cardiac perspective. 
   
  
 [x]        High complexity decision making was performed 
  
 
 
 
 
Subjective:  
 
Teresa Mcclellan denies chest pain, dyspnea. Discussed with RN events overnight. Review of Systems: 
 
Symptom Y/N Comments  Symptom Y/N Comments Fever/Chills N   Chest Pain N Poor Appetite N   Edema N   
Cough N   Abdominal Pain N Sputum N   Joint Pain N   
SOB/ZAMUDIO N   Pruritis/Rash N   
Nausea/vomit N   Tolerating PT/OT Y Diarrhea N   Tolerating Diet Y Constipation N   Other Could NOT obtain due to:   
 
Objective:  
  
Physical Exam: 
 
Last 24hrs VS reviewed since prior progress note. Most recent are: 
 
Visit Vitals /66 Pulse 76 Temp 97.7 °F (36.5 °C) (Oral) Resp 18 Ht 5' 7\" (1.702 m) Wt 69.2 kg (152 lb 8.9 oz) SpO2 100% BMI 23.89 kg/m² Intake/Output Summary (Last 24 hours) at 11/23/2018 1151 Last data filed at 11/23/2018 1041 Gross per 24 hour Intake 800 ml Output 2000 ml Net -1200 ml General Appearance: Well developed, well nourished, alert & oriented x 3,  
 no acute distress. Ears/Nose/Mouth/Throat: Hearing grossly normal. 
Neck: Supple. Chest: Lungs clear to auscultation bilaterally. Cardiovascular: Regular rate and rhythm, S1S2 normal, no murmur. Abdomen: Soft, non-tender, bowel sounds are active. Extremities: No edema bilaterally. Skin: Warm and dry. PMH/SH reviewed - no change compared to H&P Data Review Telemetry: normal sinus rhythm Lab Data Personally Reviewed: 
 
Recent Labs  
  11/23/18 
6204 11/22/18 
9528 WBC 10.8 11.1 HGB 9.4* 9.3* HCT 28.9* 28.7*  
 199 Recent Labs  
  11/23/18 
0735 11/22/18 
0527 11/21/18 
1612 INR 2.3* 2.1* 1.9* PTP 22.7* 20.6* 18.5* Recent Labs  
  11/23/18 
0735 11/22/18 0527 11/21/18 
4666 *  136 135* 134* K 4.2  4.2 4.1 4.1 CL 98  98 100 99 CO2 27  28 30 25 BUN 36*  36* 29* 46* CREA 10.60*  10.20* 7.80* 11.30* GLU 91  90 78 88 CA 8.8  8.9 8.8 8.5 No results for input(s): CPK, CKNDX, TROIQ in the last 72 hours. No lab exists for component: CPKMB Lab Results Component Value Date/Time Cholesterol, total 139 05/22/2012 05:00 PM  
 HDL Cholesterol 32 05/22/2012 05:00 PM  
 LDL, calculated 75.8 05/22/2012 05:00 PM  
 Triglyceride 156 (H) 05/22/2012 05:00 PM  
 CHOL/HDL Ratio 4.3 05/22/2012 05:00 PM  
 
 
Recent Labs  
  11/23/18 
0735 11/22/18 0527 SGOT 12* 12* AP 75 68  
TP 7.1 7.2 ALB 2.8*  2.8* 2.9*  
GLOB 4.3* 4.3* No results for input(s): PH, PCO2, PO2 in the last 72 hours. Medications Personally Reviewed: 
 
Current Facility-Administered Medications Medication Dose Route Frequency  gentamicin in saline (GARAMYCIN) infusion 100 mg  100 mg IntraVENous ONCE  warfarin (COUMADIN) tablet 4 mg  4 mg Oral ONCE  
 metoprolol succinate (TOPROL-XL) XL tablet 12.5 mg  12.5 mg Oral DAILY  epoetin emilie (EPOGEN;PROCRIT) 14,000 Units  14,000 Units SubCUTAneous Once per day on Mon Tue Wed Thu  
 vancomycin (VANCOCIN) 500 mg in 0.9% sodium chloride 100 mL IVPB  500 mg IntraVENous DIALYSIS PRN And  
 VANCOMYCIN INFORMATION NOTE   Other DAILY  gentamicin in saline (GARAMYCIN) infusion 100 mg  100 mg IntraVENous DIALYSIS PRN  
 aspirin chewable tablet 81 mg  81 mg Oral DAILY  oxyCODONE-acetaminophen (PERCOCET) 5-325 mg per tablet 1 Tab  1 Tab Oral Q6H PRN  
 WARFARIN INFORMATION NOTE (COUMADIN)   Other QPM  
 sodium chloride (NS) flush 5-10 mL  5-10 mL IntraVENous Q8H  
 sodium chloride (NS) flush 5-10 mL  5-10 mL IntraVENous PRN  
 acetaminophen (TYLENOL) tablet 650 mg  650 mg Oral Q4H PRN  
 ondansetron (ZOFRAN) injection 4 mg  4 mg IntraVENous Q6H PRN  
 naloxone (NARCAN) injection 0.1 mg  0.1 mg IntraVENous EVERY 2 MINUTES AS NEEDED  
 atorvastatin (LIPITOR) tablet 20 mg  20 mg Oral DAILY  calcitRIOL (ROCALTROL) capsule 0.5 mcg  0.5 mcg Oral DAILY  lacosamide (VIMPAT) tablet 50 mg  50 mg Oral BID  levETIRAcetam (KEPPRA) tablet 750 mg  750 mg Oral BID  pantoprazole (PROTONIX) tablet 40 mg  40 mg Oral DAILY  sevelamer carbonate (RENVELA) tab 2,400 mg  2,400 mg Oral TID WITH MEALS Fredy Nelson MD

## 2018-11-23 NOTE — PROGRESS NOTES
Problem: Falls - Risk of 
Goal: *Absence of Falls Document Leighton Arriaga Fall Risk and appropriate interventions in the flowsheet. Outcome: Resolved/Met Date Met: 11/23/18 Fall Risk Interventions: 
  
 
  
 
Medication Interventions: Teach patient to arise slowly History of Falls Interventions: Consult care management for discharge planning

## 2018-11-23 NOTE — PROGRESS NOTES
Bedside and Verbal shift change report given to Reena Giles (oncoming nurse) by Rose Mary Royal (offgoing nurse). Report included the following information SBAR, Kardex, Intake/Output, MAR, Accordion, Recent Results and Cardiac Rhythm NSR.

## 2018-11-23 NOTE — PROGRESS NOTES
11/23/2018 Reviewed with patient discharge orders and signed. rescription for Reevala given, no apparent manifestation or somatic complaints during discharge. Patient escorted via wheelchair with sister destination home. . All belongings taken with patient.

## 2018-11-23 NOTE — DIALYSIS
[facility] Dialysis Team [place]  [phone] Vitals   Pre   Post   Assessment   Pre   Post    
Temp  Temp: 97.9 °F (36.6 °C) (11/23/18 0736)  97.7 LOC  A&Ox3 A&Ox3 HR   Pulse (Heart Rate): 77 (11/23/18 0736) 79 Lungs   clear clear B/P   BP: 113/70 (11/23/18 0736) 98/66 Cardiac   RRR RRR Resp   Resp Rate: 18 (11/23/18 0736) 18 Skin   Cool, dry and intact Cool, dry and intact Pain level  Pain Intensity 1: 6 (11/23/18 0658) 0 Edema  generalized generalized Orders: Duration:   Start:    0736 End:    1041 Total:   3 hours Dialyzer:   Dialyzer/Set Up Inspection: Sergio Martin (11/23/18 0736) K Bath:   Dialysate K (mEq/L): 3 (11/23/18 0736) Ca Bath:   Dialysate CA (mEq/L): 2.5 (11/23/18 0736) Na/Bicarb:   Dialysate NA (mEq/L): 140 (11/23/18 0736) Target Fluid Removal:    2500mL Access Type & Location:    KUSHAL AVG, + b/t. Access cleaned with alcohol per policy and procedure, cannulated with 15gx2 w/o difficulty. Labs Obtained/Reviewed Critical Results Called   Date when labs were drawn- 
Hgb-   
HGB Date Value Ref Range Status 11/22/2018 9.3 (L) 12.1 - 17.0 g/dL Final  
 
K-   
Potassium Date Value Ref Range Status 11/22/2018 4.1 3.5 - 5.1 mmol/L Final  
 
Ca-  
Calcium Date Value Ref Range Status 11/22/2018 8.8 8.5 - 10.1 MG/DL Final  
 
Bun-  
BUN Date Value Ref Range Status 11/22/2018 29 (H) 6 - 20 MG/DL Final  
  Comment:  
  INVESTIGATED PER DELTA CHECK PROTOCOL Creat-  
Creatinine Date Value Ref Range Status 11/22/2018 7.80 (H) 0.70 - 1.30 MG/DL Final  
  Comment:  
  INVESTIGATED PER DELTA CHECK PROTOCOL Medications/ Blood Products Given Name   Dose   Route and Time Vancomycin 500mg IV @ 8453 Blood Volume Processed (BVP):    71.8 Net Fluid Removed:  2000mL Comments All dialysis related medications have been reviewed. Assessment performed by RN.   Procedure and documentation observed and reviewed by Severino Markham Sybil Maki, RN. Time Out Done: 1229 Primary Nurse Rpt Pre:  Km Allen RN 
Primary Nurse Rpt Post: Radha Hayes RN 
Pt Education: procedural 
Care Plan: on going Tx Summary: 
200:  SBAR received from Primary RN. Pt arrived to HD suite A&Ox3, denies complaints. Pt has KUSHAL AVG, + b/t. Access cleaned with alcohol per policy and procedure, cannulated with 15gx2 w/o difficulty. VSS, tx initiated. 0800:  Pt resting well 0815:  Pt resting well 0830:  Pt resting well 0845:  Pt resting well 
0900:  Pt resting well 
0915:  Pt resting well 
0929:  Vancomycin started 0930:  Pt resting well 
0945:  Pt resting well 
1000:  Pt resting well 1015:  Pt resting well 
1030:  Pt resting well 
1041:  Tx terminated. All possible blood returned to pt w/o difficulty. Needles pulled, manual pressure held for approx 3 min. Dr Geoff Samaniego at bedside. SBAR called to Primary RN. VSS, pt returned to room. Admiting Diagnosis: sepsis Pt's previous clinic- [facility] (home therapies) Consent signed - Informed Consent Verified: Yes (11/23/18 5836) Winter Consent - signed and on file Hepatitis Status- neg 2/5/18 >1000 Machine #- Machine Number: [id number] (11/23/18 4918) Telemetry status- Estrella Damian Pre-dialysis wt. -

## 2018-11-23 NOTE — DISCHARGE SUMMARY
Hospitalist Discharge Summary     Patient ID:  May Bergman  [id number]  89 y.o.  1975    PCP on record: Gretta Campbell MD    Admit date: 11/16/2018  Discharge date and time: 11/23/2018      DISCHARGE DIAGNOSIS:  Sepsis secondary to suspected infected HD femoral cath  AICD, waiting echocardiogram report. Discharged with Vancomycin 500 mg iv TIW  / Gentamicin 68 mg ( 1mg /kg) TIW  to be administered after  HD . End date 11/30  blood culture-NGTD  Vascular surgery appreciated  appreciated ID and renal eval  End-stage renal disease  Continue hemodialysis as per nephrology  Borderline low BP noted. and 15 beats of Vtach,cardiology help appreciated   History of DVT  Continue coumadin and follow up INR   Seizure  We will continue home seizure medication   Coronary artery disease  AICD  We will continue home medication Lipitor  Peripheral vascular disease  on hold Plavix aspirin  Abdominal pain/back pain  Resolved   Code Status: full code   Surrogate Decision Maker:Brigette Mota, Sister   DVT Prophylaxis: Coumadin       CONSULTATIONS:  IP CONSULT TO HOSPITALIST  IP CONSULT TO NEPHROLOGY  IP CONSULT TO UROLOGY  IP CONSULT TO INFECTIOUS DISEASES  IP CONSULT TO VASCULAR SURGERY  IP CONSULT TO CARDIOLOGY    Excerpted HPI from H&P of Lupe Springer MD:  41years old male from home with past medical history significant for AICD, coronary artery disease, small bowel obstruction, V. tach, end-stage renal disease, seizure, GERD, thrombocytopenia presented to the hospital complaining from fever associated with abdominal pain and back pain started today associated with chills patient denies any shortness of breath any cough, patient on home hemodialysis and patient dialyze himself Tuesday Wednesday Thursday Saturday and Sunday patient stated he does get right femoral dialysis catheter 3 weeks ago and he usually follow-up with nephrologist at [facility] patient stated he get femoral catheter because his fistula is not working  We were asked to admit for work up and evaluation of the above problems. ______________________________________________________________________  DISCHARGE SUMMARY/HOSPITAL COURSE:  for full details see H&P, daily progress notes, labs, consult notes. _______________________________________________________________________  Patient seen and examined by me on discharge day. Pertinent Findings:  Patient is awake and alert oriented x3   sitting in chair no distress on room air. Cardiovascular regular rate with a systolic murmur no rubs or gallops. Lungs are clear no wheezes rhonchi or crackles. Abdomen bowel sounds are present soft some tenderness in the left lower quadrant no rebound or guarding and tenderness in the left flank to palpation, mild     Extremities-some tight edema no clubbing or cyanosis      _______________________________________________________________________  DISCHARGE MEDICATIONS:   Current Discharge Medication List      START taking these medications    Details   gentamicin in saline (GARAMYCIN) 100 mg/50 mL IVPB premix 50 mL by IntraVENous route once for 1 dose. Qty: 50 mL, Refills: 0      vancomycin 500 mg 500 mg IVPB 500 mg by IntraVENous route DIALYSIS PRN (at the end of dialysis during the last hour on hemodialysis days.) for up to 7 days. Qty: 5 Dose, Refills: 0         CONTINUE these medications which have CHANGED    Details   !! sevelamer carbonate (RENVELA) 800 mg tab tab Take 3 Tabs by mouth three (3) times daily (with meals). Qty: 90 Tab, Refills: 0       !! - Potential duplicate medications found. Please discuss with provider. CONTINUE these medications which have NOT CHANGED    Details   !! sevelamer carbonate (RENVELA) 800 mg tab tab Take 1,600 mg by mouth three (3) times daily (with meals). Only takes if eating a  meal      !! warfarin (COUMADIN) 2.5 mg tablet Take 2.5 mg by mouth every Tuesday and Thursday.       !! warfarin (COUMADIN) 5 mg tablet Take 5 mg by mouth five (5) days a week. All days except Tuesday and Thursday, patient takes 2.5 mg      metoprolol succinate (TOPROL-XL) 25 mg XL tablet Take 12.5 mg by mouth daily. ondansetron (ZOFRAN ODT) 4 mg disintegrating tablet Take 1 Tab by mouth every eight (8) hours as needed for Nausea. Qty: 10 Tab, Refills: 0      amLODIPine (NORVASC) 2.5 mg tablet Take 2.5 mg by mouth daily. omeprazole (PRILOSEC) 20 mg capsule Take 20 mg by mouth daily. calcitRIOL (ROCALTROL) 0.5 mcg capsule Take 0.5 mcg by mouth daily. levETIRAcetam (KEPPRA) 750 mg tablet Take 750 mg by mouth two (2) times a day. lacosamide (VIMPAT) 50 mg tab tablet Take 50 mg by mouth two (2) times a day. atorvastatin (LIPITOR) 20 mg tablet Take 20 mg by mouth daily. aspirin 81 mg chewable tablet Take 81 mg by mouth daily. oxyCODONE-acetaminophen (PERCOCET) 5-325 mg per tablet Take 1-2 Tabs by mouth every four (4) hours as needed for Pain. Max Daily Amount: 12 Tabs. Qty: 30 Tab, Refills: 0      HYDROcodone-acetaminophen (NORCO) 5-325 mg per tablet Take 1 Tab by mouth every four (4) hours as needed for Pain. Max Daily Amount: 6 Tabs. Qty: 20 Tab, Refills: 0       !! - Potential duplicate medications found. Please discuss with provider. My Recommended Diet, Activity, Wound Care, and follow-up labs are listed in the patient's Discharge Insturctions which I have personally completed and reviewed.     ______________________________________________________________________    Risk of deterioration: Moderate    Condition at Discharge:  Stable  ______________________________________________________________________    Disposition  Home with family and home health services    ______________________________________________________________________    Care Plan discussed with:   Patient, Family, RN, Care Manager, Consultant    Comment: ______________________________________________________________________    Code Status: Full Code  ___

## 2018-11-23 NOTE — PROGRESS NOTES
ID 
Pt not in Dialysis . Gone to ECHO  
 D/w Dr Horacio Miranda who is on call for Nephrology . Pt goes to [facility] clinic in [place] . He could go in for HD next week in order to get his Vancomycin & Gentamicin IV Will do orders for Vancomycin & Gentamicin to be administered after HD at [place], [place], Friday next week. Voice mail message left for [facility], .  
 
MD José Luis Orozco

## 2018-11-23 NOTE — PROGRESS NOTES
Pharmacy Daily Dosing of Warfarin Indication: Hx of DVT, Hx of HIT Goal INR: 2-3 PTA Warfarin Dose: 2.5 mg every Tuesday and Thursday, 5 mg every other day of the week Concurrent anticoagulants:  
Concurrent antiplatelet: ASA 81 mg po Daily Major Interacting Medications ? Drugs that may increase INR:  
? Drugs that may decrease INR:  
 
Conditions that may increase/decrease INR (CHF, C. diff, cirrhosis, thyroid disorder, hypoalbuminemia):  
 
Labs: 
Recent Labs  
  11/23/18 
0735 11/22/18 
0527  11/21/18 
1612 11/21/18 
5468 INR 2.3* 2.1*  --  1.9*  --   
HGB 9.4* 9.3*  --   --  8.9*  
 199  --   --  180 SGOT 12* 12*  --   --   --   
TBILI 0.4 0.4  --   --   --   
ALB 2.8*  2.8* 2.9*   < >  --   --   
 < > = values in this interval not displayed. Impression/Plan: · ADD = 4.28 mg · Warfarin 4 mg tonight. · Daily INR · CBC w/o diff QOD Pharmacy will follow daily and adjust the dose as appropriate. Thanks Citlaly Babin, PHARMD 
 
 
[email or web address]/[facility]/Pharmacy/Clinical%20Companion/Warfarin%20Dosing%20Protocol. pdf

## 2018-11-24 LAB
BACTERIA SPEC CULT: NORMAL
SERVICE CMNT-IMP: NORMAL

## 2019-01-16 ENCOUNTER — HOSPITAL ENCOUNTER (INPATIENT)
Age: 44
LOS: 4 days | Discharge: HOME | DRG: 871 | End: 2019-01-20
Attending: EMERGENCY MEDICINE | Admitting: INTERNAL MEDICINE
Payer: MEDICARE

## 2019-01-16 ENCOUNTER — APPOINTMENT (OUTPATIENT)
Dept: CT IMAGING | Age: 44
DRG: 871 | End: 2019-01-16
Attending: EMERGENCY MEDICINE
Payer: MEDICARE

## 2019-01-16 ENCOUNTER — APPOINTMENT (OUTPATIENT)
Dept: GENERAL RADIOLOGY | Age: 44
DRG: 871 | End: 2019-01-16
Attending: EMERGENCY MEDICINE
Payer: MEDICARE

## 2019-01-16 DIAGNOSIS — J18.9 HOSPITAL-ACQUIRED PNEUMONIA: ICD-10-CM
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
## 2019-01-16 DIAGNOSIS — A41.9 SEPSIS, DUE TO UNSPECIFIED ORGANISM: Primary | ICD-10-CM

## 2019-01-16 LAB
ALBUMIN SERPL-MCNC: 4.4 G/DL (ref 3.5–5)
ALBUMIN/GLOB SERPL: 0.8 {RATIO} (ref 1.1–2.2)
ALP SERPL-CCNC: 120 U/L (ref 45–117)
ALT SERPL-CCNC: 19 U/L (ref 12–78)
ANION GAP SERPL CALC-SCNC: 14 MMOL/L (ref 5–15)
AST SERPL-CCNC: 24 U/L (ref 15–37)
ATRIAL RATE: 98 BPM
BASOPHILS # BLD: 0.1 K/UL (ref 0–0.1)
BASOPHILS NFR BLD: 1 % (ref 0–1)
BILIRUB SERPL-MCNC: 0.5 MG/DL (ref 0.2–1)
BUN SERPL-MCNC: 39 MG/DL (ref 6–20)
BUN/CREAT SERPL: 4 (ref 12–20)
CALCIUM SERPL-MCNC: 9.6 MG/DL (ref 8.5–10.1)
CALCULATED P AXIS, ECG09: 73 DEGREES
CALCULATED R AXIS, ECG10: 78 DEGREES
CALCULATED T AXIS, ECG11: -161 DEGREES
CHLORIDE SERPL-SCNC: 90 MMOL/L (ref 97–108)
CK MB CFR SERPL CALC: 2 % (ref 0–2.5)
CK MB SERPL-MCNC: 2.3 NG/ML (ref 5–25)
CK SERPL-CCNC: 113 U/L (ref 39–308)
CO2 SERPL-SCNC: 26 MMOL/L (ref 21–32)
CREAT SERPL-MCNC: 11 MG/DL (ref 0.7–1.3)
DIAGNOSIS, 93000: NORMAL
DIFFERENTIAL METHOD BLD: ABNORMAL
EOSINOPHIL # BLD: 0.4 K/UL (ref 0–0.4)
EOSINOPHIL NFR BLD: 6 % (ref 0–7)
ERYTHROCYTE [DISTWIDTH] IN BLOOD BY AUTOMATED COUNT: 15.9 % (ref 11.5–14.5)
FLUAV AG NPH QL IA: NEGATIVE
FLUBV AG NOSE QL IA: NEGATIVE
GLOBULIN SER CALC-MCNC: 5.8 G/DL (ref 2–4)
GLUCOSE SERPL-MCNC: 96 MG/DL (ref 65–100)
HCT VFR BLD AUTO: 40.3 % (ref 36.6–50.3)
HGB BLD-MCNC: 13.4 G/DL (ref 12.1–17)
IMM GRANULOCYTES # BLD AUTO: 0 K/UL (ref 0–0.04)
IMM GRANULOCYTES NFR BLD AUTO: 0 % (ref 0–0.5)
INR PPP: 1.7 (ref 0.9–1.1)
LACTATE BLD-SCNC: 1.04 MMOL/L (ref 0.4–2)
LACTATE SERPL-SCNC: 1.3 MMOL/L (ref 0.4–2)
LYMPHOCYTES # BLD: 0.5 K/UL (ref 0.8–3.5)
LYMPHOCYTES NFR BLD: 7 % (ref 12–49)
MCH RBC QN AUTO: 31.6 PG (ref 26–34)
MCHC RBC AUTO-ENTMCNC: 33.3 G/DL (ref 30–36.5)
MCV RBC AUTO: 95 FL (ref 80–99)
MONOCYTES # BLD: 0.7 K/UL (ref 0–1)
MONOCYTES NFR BLD: 11 % (ref 5–13)
NEUTS SEG # BLD: 4.8 K/UL (ref 1.8–8)
NEUTS SEG NFR BLD: 75 % (ref 32–75)
NRBC # BLD: 0 K/UL (ref 0–0.01)
NRBC BLD-RTO: 0 PER 100 WBC
P-R INTERVAL, ECG05: 166 MS
PLATELET # BLD AUTO: 184 K/UL (ref 150–400)
PMV BLD AUTO: 10.4 FL (ref 8.9–12.9)
POTASSIUM SERPL-SCNC: 3.2 MMOL/L (ref 3.5–5.1)
PROT SERPL-MCNC: 10.2 G/DL (ref 6.4–8.2)
PROTHROMBIN TIME: 16.5 SEC (ref 9–11.1)
Q-T INTERVAL, ECG07: 390 MS
QRS DURATION, ECG06: 100 MS
QTC CALCULATION (BEZET), ECG08: 497 MS
RBC # BLD AUTO: 4.24 M/UL (ref 4.1–5.7)
RBC MORPH BLD: ABNORMAL
SODIUM SERPL-SCNC: 130 MMOL/L (ref 136–145)
TROPONIN I BLD-MCNC: <0.04 NG/ML (ref 0–0.08)
TROPONIN I SERPL-MCNC: 0.05 NG/ML
TROPONIN I SERPL-MCNC: <0.05 NG/ML
VENTRICULAR RATE, ECG03: 98 BPM
WBC # BLD AUTO: 6.5 K/UL (ref 4.1–11.1)

## 2019-01-16 PROCEDURE — 83605 ASSAY OF LACTIC ACID: CPT

## 2019-01-16 PROCEDURE — 84484 ASSAY OF TROPONIN QUANT: CPT

## 2019-01-16 PROCEDURE — 74011250636 HC RX REV CODE- 250/636: Performed by: EMERGENCY MEDICINE

## 2019-01-16 PROCEDURE — 77010033678 HC OXYGEN DAILY

## 2019-01-16 PROCEDURE — 80053 COMPREHEN METABOLIC PANEL: CPT

## 2019-01-16 PROCEDURE — 93005 ELECTROCARDIOGRAM TRACING: CPT

## 2019-01-16 PROCEDURE — 96376 TX/PRO/DX INJ SAME DRUG ADON: CPT

## 2019-01-16 PROCEDURE — 74011000258 HC RX REV CODE- 258: Performed by: EMERGENCY MEDICINE

## 2019-01-16 PROCEDURE — 96375 TX/PRO/DX INJ NEW DRUG ADDON: CPT

## 2019-01-16 PROCEDURE — 74011000250 HC RX REV CODE- 250: Performed by: EMERGENCY MEDICINE

## 2019-01-16 PROCEDURE — 96365 THER/PROPH/DIAG IV INF INIT: CPT

## 2019-01-16 PROCEDURE — 77030029684 HC NEB SM VOL KT MONA -A

## 2019-01-16 PROCEDURE — 74011250637 HC RX REV CODE- 250/637: Performed by: EMERGENCY MEDICINE

## 2019-01-16 PROCEDURE — 74011250637 HC RX REV CODE- 250/637: Performed by: INTERNAL MEDICINE

## 2019-01-16 PROCEDURE — 94640 AIRWAY INHALATION TREATMENT: CPT

## 2019-01-16 PROCEDURE — 85610 PROTHROMBIN TIME: CPT

## 2019-01-16 PROCEDURE — 36415 COLL VENOUS BLD VENIPUNCTURE: CPT

## 2019-01-16 PROCEDURE — 85025 COMPLETE CBC W/AUTO DIFF WBC: CPT

## 2019-01-16 PROCEDURE — 65660000000 HC RM CCU STEPDOWN

## 2019-01-16 PROCEDURE — 99285 EMERGENCY DEPT VISIT HI MDM: CPT

## 2019-01-16 PROCEDURE — 87804 INFLUENZA ASSAY W/OPTIC: CPT

## 2019-01-16 PROCEDURE — 74176 CT ABD & PELVIS W/O CONTRAST: CPT

## 2019-01-16 PROCEDURE — 74011250636 HC RX REV CODE- 250/636: Performed by: INTERNAL MEDICINE

## 2019-01-16 PROCEDURE — 71046 X-RAY EXAM CHEST 2 VIEWS: CPT

## 2019-01-16 PROCEDURE — 87040 BLOOD CULTURE FOR BACTERIA: CPT

## 2019-01-16 PROCEDURE — 82550 ASSAY OF CK (CPK): CPT

## 2019-01-16 RX ORDER — AMLODIPINE BESYLATE 2.5 MG/1
2.5 TABLET ORAL DAILY
Status: DISCONTINUED | OUTPATIENT
Start: 2019-01-17 | End: 2019-01-16

## 2019-01-16 RX ORDER — IPRATROPIUM BROMIDE AND ALBUTEROL SULFATE 2.5; .5 MG/3ML; MG/3ML
3 SOLUTION RESPIRATORY (INHALATION) ONCE
Status: COMPLETED | OUTPATIENT
Start: 2019-01-16 | End: 2019-01-16

## 2019-01-16 RX ORDER — HYDROCODONE BITARTRATE AND ACETAMINOPHEN 5; 325 MG/1; MG/1
1 TABLET ORAL
Status: DISCONTINUED | OUTPATIENT
Start: 2019-01-16 | End: 2019-01-20 | Stop reason: HOSPADM

## 2019-01-16 RX ORDER — ATORVASTATIN CALCIUM 10 MG/1
20 TABLET, FILM COATED ORAL DAILY
Status: DISCONTINUED | OUTPATIENT
Start: 2019-01-17 | End: 2019-01-20 | Stop reason: HOSPADM

## 2019-01-16 RX ORDER — OXYCODONE AND ACETAMINOPHEN 5; 325 MG/1; MG/1
1-2 TABLET ORAL
Status: DISCONTINUED | OUTPATIENT
Start: 2019-01-16 | End: 2019-01-17

## 2019-01-16 RX ORDER — CALCITRIOL 0.25 UG/1
0.5 CAPSULE ORAL DAILY
Status: DISCONTINUED | OUTPATIENT
Start: 2019-01-17 | End: 2019-01-20 | Stop reason: HOSPADM

## 2019-01-16 RX ORDER — SODIUM CHLORIDE 0.9 % (FLUSH) 0.9 %
5-40 SYRINGE (ML) INJECTION EVERY 8 HOURS
Status: DISCONTINUED | OUTPATIENT
Start: 2019-01-16 | End: 2019-01-18

## 2019-01-16 RX ORDER — MORPHINE SULFATE 4 MG/ML
2 INJECTION INTRAVENOUS
Status: DISCONTINUED | OUTPATIENT
Start: 2019-01-16 | End: 2019-01-20 | Stop reason: HOSPADM

## 2019-01-16 RX ORDER — POTASSIUM CHLORIDE 20 MEQ/1
20 TABLET, EXTENDED RELEASE ORAL
Status: DISCONTINUED | OUTPATIENT
Start: 2019-01-16 | End: 2019-01-16

## 2019-01-16 RX ORDER — ACETAMINOPHEN 325 MG/1
650 TABLET ORAL
Status: COMPLETED | OUTPATIENT
Start: 2019-01-16 | End: 2019-01-16

## 2019-01-16 RX ORDER — LACOSAMIDE 50 MG/1
100 TABLET ORAL 2 TIMES DAILY
Status: DISCONTINUED | OUTPATIENT
Start: 2019-01-16 | End: 2019-01-17

## 2019-01-16 RX ORDER — PANTOPRAZOLE SODIUM 40 MG/1
40 TABLET, DELAYED RELEASE ORAL
Status: DISCONTINUED | OUTPATIENT
Start: 2019-01-17 | End: 2019-01-20 | Stop reason: HOSPADM

## 2019-01-16 RX ORDER — SODIUM CHLORIDE 0.9 % (FLUSH) 0.9 %
5-40 SYRINGE (ML) INJECTION EVERY 8 HOURS
Status: DISCONTINUED | OUTPATIENT
Start: 2019-01-16 | End: 2019-01-20 | Stop reason: HOSPADM

## 2019-01-16 RX ORDER — MORPHINE SULFATE 4 MG/ML
4 INJECTION INTRAVENOUS
Status: COMPLETED | OUTPATIENT
Start: 2019-01-16 | End: 2019-01-16

## 2019-01-16 RX ORDER — WARFARIN 2.5 MG/1
2.5 TABLET ORAL
Status: DISCONTINUED | OUTPATIENT
Start: 2019-01-17 | End: 2019-01-16

## 2019-01-16 RX ORDER — IBUPROFEN 600 MG/1
600 TABLET ORAL ONCE
Status: COMPLETED | OUTPATIENT
Start: 2019-01-16 | End: 2019-01-16

## 2019-01-16 RX ORDER — OMEPRAZOLE 20 MG/1
20 CAPSULE, DELAYED RELEASE ORAL DAILY
Status: DISCONTINUED | OUTPATIENT
Start: 2019-01-17 | End: 2019-01-16

## 2019-01-16 RX ORDER — POTASSIUM CHLORIDE 750 MG/1
20 TABLET, FILM COATED, EXTENDED RELEASE ORAL
Status: COMPLETED | OUTPATIENT
Start: 2019-01-16 | End: 2019-01-16

## 2019-01-16 RX ORDER — SODIUM CHLORIDE 0.9 % (FLUSH) 0.9 %
5-40 SYRINGE (ML) INJECTION AS NEEDED
Status: DISCONTINUED | OUTPATIENT
Start: 2019-01-16 | End: 2019-01-20 | Stop reason: HOSPADM

## 2019-01-16 RX ORDER — POTASSIUM CHLORIDE 750 MG/1
40 TABLET, FILM COATED, EXTENDED RELEASE ORAL
Status: COMPLETED | OUTPATIENT
Start: 2019-01-16 | End: 2019-01-16

## 2019-01-16 RX ORDER — MORPHINE SULFATE 10 MG/ML
4 INJECTION, SOLUTION INTRAMUSCULAR; INTRAVENOUS
Status: COMPLETED | OUTPATIENT
Start: 2019-01-16 | End: 2019-01-16

## 2019-01-16 RX ORDER — METOPROLOL SUCCINATE 25 MG/1
12.5 TABLET, EXTENDED RELEASE ORAL DAILY
Status: DISCONTINUED | OUTPATIENT
Start: 2019-01-17 | End: 2019-01-20 | Stop reason: HOSPADM

## 2019-01-16 RX ORDER — LACOSAMIDE 50 MG/1
50 TABLET ORAL 2 TIMES DAILY
Status: DISCONTINUED | OUTPATIENT
Start: 2019-01-16 | End: 2019-01-20 | Stop reason: HOSPADM

## 2019-01-16 RX ORDER — LEVETIRACETAM 500 MG/1
500 TABLET ORAL 2 TIMES DAILY
Status: DISCONTINUED | OUTPATIENT
Start: 2019-01-16 | End: 2019-01-16

## 2019-01-16 RX ORDER — WARFARIN SODIUM 5 MG/1
5 TABLET ORAL
Status: DISCONTINUED | OUTPATIENT
Start: 2019-01-16 | End: 2019-01-16 | Stop reason: DRUGHIGH

## 2019-01-16 RX ORDER — VANCOMYCIN HYDROCHLORIDE
1250
Status: COMPLETED | OUTPATIENT
Start: 2019-01-16 | End: 2019-01-16

## 2019-01-16 RX ORDER — SEVELAMER CARBONATE 800 MG/1
1600 TABLET, FILM COATED ORAL
Status: DISCONTINUED | OUTPATIENT
Start: 2019-01-16 | End: 2019-01-20 | Stop reason: HOSPADM

## 2019-01-16 RX ORDER — GUAIFENESIN 100 MG/5ML
81 LIQUID (ML) ORAL DAILY
Status: DISCONTINUED | OUTPATIENT
Start: 2019-01-17 | End: 2019-01-20 | Stop reason: HOSPADM

## 2019-01-16 RX ORDER — ONDANSETRON 2 MG/ML
4 INJECTION INTRAMUSCULAR; INTRAVENOUS
Status: COMPLETED | OUTPATIENT
Start: 2019-01-16 | End: 2019-01-16

## 2019-01-16 RX ORDER — SODIUM CHLORIDE 0.9 % (FLUSH) 0.9 %
5-40 SYRINGE (ML) INJECTION AS NEEDED
Status: DISCONTINUED | OUTPATIENT
Start: 2019-01-16 | End: 2019-01-18

## 2019-01-16 RX ORDER — SEVELAMER CARBONATE 800 MG/1
2400 TABLET, FILM COATED ORAL
Status: DISCONTINUED | OUTPATIENT
Start: 2019-01-16 | End: 2019-01-16

## 2019-01-16 RX ADMIN — MORPHINE SULFATE 2 MG: 4 INJECTION INTRAVENOUS at 15:48

## 2019-01-16 RX ADMIN — Medication 5 ML: at 23:35

## 2019-01-16 RX ADMIN — WARFARIN SODIUM 7.5 MG: 7.5 TABLET ORAL at 19:02

## 2019-01-16 RX ADMIN — SEVELAMER CARBONATE 1600 MG: 800 TABLET, FILM COATED ORAL at 18:49

## 2019-01-16 RX ADMIN — PIPERACILLIN SODIUM,TAZOBACTAM SODIUM 3.38 G: 3; .375 INJECTION, POWDER, FOR SOLUTION INTRAVENOUS at 09:33

## 2019-01-16 RX ADMIN — MORPHINE SULFATE 2 MG: 4 INJECTION INTRAVENOUS at 19:59

## 2019-01-16 RX ADMIN — Medication 10 ML: at 15:49

## 2019-01-16 RX ADMIN — MORPHINE SULFATE 4 MG: 10 INJECTION INTRAVENOUS at 07:50

## 2019-01-16 RX ADMIN — LACOSAMIDE 100 MG: 100 TABLET, FILM COATED ORAL at 18:43

## 2019-01-16 RX ADMIN — VANCOMYCIN HYDROCHLORIDE 1250 MG: 10 INJECTION, POWDER, LYOPHILIZED, FOR SOLUTION INTRAVENOUS at 13:11

## 2019-01-16 RX ADMIN — POTASSIUM CHLORIDE 40 MEQ: 750 TABLET, EXTENDED RELEASE ORAL at 18:43

## 2019-01-16 RX ADMIN — LACOSAMIDE 50 MG: 100 TABLET, FILM COATED ORAL at 18:43

## 2019-01-16 RX ADMIN — ACETAMINOPHEN 650 MG: 325 TABLET ORAL at 07:49

## 2019-01-16 RX ADMIN — SODIUM CHLORIDE 500 ML: 900 INJECTION, SOLUTION INTRAVENOUS at 08:33

## 2019-01-16 RX ADMIN — IPRATROPIUM BROMIDE AND ALBUTEROL SULFATE 3 ML: .5; 3 SOLUTION RESPIRATORY (INHALATION) at 08:33

## 2019-01-16 RX ADMIN — LEVETIRACETAM 750 MG: 500 TABLET, FILM COATED ORAL at 18:39

## 2019-01-16 RX ADMIN — MORPHINE SULFATE 2 MG: 4 INJECTION INTRAVENOUS at 23:35

## 2019-01-16 RX ADMIN — MORPHINE SULFATE 4 MG: 4 INJECTION INTRAVENOUS at 11:51

## 2019-01-16 RX ADMIN — IBUPROFEN 600 MG: 600 TABLET ORAL at 09:32

## 2019-01-16 RX ADMIN — ONDANSETRON 4 MG: 2 INJECTION INTRAMUSCULAR; INTRAVENOUS at 07:50

## 2019-01-16 RX ADMIN — POTASSIUM CHLORIDE 20 MEQ: 750 TABLET, EXTENDED RELEASE ORAL at 13:11

## 2019-01-16 NOTE — PROGRESS NOTES
Nephrology Progress Note Blaise Leblanc  
 
www. [email or web address]                  Phone - [phone] Patient: Britt Oneil YOB: 1975     Admit Date: 1/16/2019 Date- 1/16/2019 CC: Follow up for esrd Subjective: Interval History:  
-  Weston Pierson a   male with pmh of ESRD. The patient is followed by  Dr.TODD BANEGAS. Dialysis is performed via RIGHT arm avg His last hd yesterday He came to er with fever He is on home hemodialysis. 5 days per week 2.5 hr per day ROS:- as above Assessment:  
· End Stage Renal Disease - home hd, MCV pt · Anemia of CKD · Secondary hyperparathyroidism of renal origin · Sepsis · Hyponatremia · hypokalemia · H/o hypertension · Plan: · No dialysis today-  
· Hd TTS schedule · kdur 40 meq po · Continue norvasc · Continue calcitriol · Continue renvela · Physical Exam:  
GEN: NAD NECK- Supple, no thyromegaly RESP: Clear b/l, no wheezing, No accessory muscle use CVS: RRR,S1,S2 SKIN: No Rash, ABDO: soft , non tender, No hepatosplenomegaly EXT- right arm av graft + NEURO: non focal, normal speech Care Plan discussed with: pt and nurse Objective:  
Patient Vitals for the past 24 hrs: 
 Temp Pulse Resp BP SpO2  
01/16/19 1500  87 24 117/57 93 % 01/16/19 1412 98.7 °F (37.1 °C) 88 15 102/63 99 % 01/16/19 1400  86 25 102/63 95 % 01/16/19 1330  86 17 107/63 98 % 01/16/19 1300  90 16 108/68 94 % 01/16/19 0952 (!) 100.9 °F (38.3 °C)      
01/16/19 0945  (!) 106 25 98/55 92 % 01/16/19 0930  (!) 106 22 111/62 96 % 01/16/19 0915 (!) 103 °F (39.4 °C) (!) 104 25 105/57 96 % 01/16/19 0900  (!) 103 18 110/64 95 % 01/16/19 0845  (!) 102 25 112/57 92 % 01/16/19 0830  (!) 102 27 119/77 94 % 01/16/19 0826  100 27 121/74 94 % 01/16/19 0636 (!) 102.5 °F (39.2 °C) (!) 103 18 126/83 98 % Last 3 Recorded Weights in this Encounter 01/16/19 0636 Weight: 65.7 kg (144 lb 13.5 oz) No intake/output data recorded. Chart reviewed. Pertinent Notes reviewed. Medication list  reviewed Current Facility-Administered Medications Medication  [START ON 1/17/2019] amLODIPine (NORVASC) tablet 2.5 mg  
 [START ON 1/17/2019] aspirin chewable tablet 81 mg  
 [START ON 1/17/2019] atorvastatin (LIPITOR) tablet 20 mg  
 [START ON 1/17/2019] calcitRIOL (ROCALTROL) capsule 0.5 mcg  lacosamide (VIMPAT) tablet 50 mg  
 HYDROcodone-acetaminophen (NORCO) 5-325 mg per tablet 1 Tab  warfarin (COUMADIN) tablet 5 mg  [START ON 1/17/2019] warfarin (COUMADIN) tablet 2.5 mg  
 sevelamer carbonate (RENVELA) tab 2,400 mg  
 sevelamer carbonate (RENVELA) tab 1,600 mg  
 oxyCODONE-acetaminophen (PERCOCET) 5-325 mg per tablet 1-2 Tab  levETIRAcetam (KEPPRA) tablet 750 mg  [START ON 1/17/2019] metoprolol succinate (TOPROL-XL) XL tablet 12.5 mg  
 [START ON 1/17/2019] omeprazole (PRILOSEC) capsule 20 mg  
 sodium chloride (NS) flush 5-40 mL  sodium chloride (NS) flush 5-40 mL  sodium chloride (NS) flush 5-40 mL  sodium chloride (NS) flush 5-40 mL  levETIRAcetam (KEPPRA) tablet 500 mg  morphine injection 2 mg  lacosamide (VIMPAT) tablet 100 mg  [START ON 1/17/2019] piperacillin-tazobactam (ZOSYN) 3.375 g in 0.9% sodium chloride (MBP/ADV) 100 mL Current Outpatient Medications Medication Sig  sevelamer carbonate (RENVELA) 800 mg tab tab Take 3 Tabs by mouth three (3) times daily (with meals).  sevelamer carbonate (RENVELA) 800 mg tab tab Take 1,600 mg by mouth three (3) times daily (with meals). Only takes if eating a  meal  
 warfarin (COUMADIN) 2.5 mg tablet Take 2.5 mg by mouth every Tuesday and Thursday.  warfarin (COUMADIN) 5 mg tablet Take 5 mg by mouth five (5) days a week.  All days except Tuesday and Thursday, patient takes 2.5 mg  
  metoprolol succinate (TOPROL-XL) 25 mg XL tablet Take 12.5 mg by mouth daily.  oxyCODONE-acetaminophen (PERCOCET) 5-325 mg per tablet Take 1-2 Tabs by mouth every four (4) hours as needed for Pain. Max Daily Amount: 12 Tabs.  HYDROcodone-acetaminophen (NORCO) 5-325 mg per tablet Take 1 Tab by mouth every four (4) hours as needed for Pain. Max Daily Amount: 6 Tabs.  ondansetron (ZOFRAN ODT) 4 mg disintegrating tablet Take 1 Tab by mouth every eight (8) hours as needed for Nausea.  amLODIPine (NORVASC) 2.5 mg tablet Take 2.5 mg by mouth daily.  omeprazole (PRILOSEC) 20 mg capsule Take 20 mg by mouth daily.  calcitRIOL (ROCALTROL) 0.5 mcg capsule Take 0.5 mcg by mouth daily.  levETIRAcetam (KEPPRA) 750 mg tablet Take 750 mg by mouth two (2) times a day.  lacosamide (VIMPAT) 50 mg tab tablet Take 50 mg by mouth two (2) times a day.  atorvastatin (LIPITOR) 20 mg tablet Take 20 mg by mouth daily.  aspirin 81 mg chewable tablet Take 81 mg by mouth daily. Data Review : 
Recent Labs  
  01/16/19 
6431 WBC 6.5 HGB 13.4  ANEU 4.8 *  
K 3.2*  
GLU 96 BUN 39* CREA 11.00* ALT 19 SGOT 24 TBILI 0.5 * CA 9.6 Lab Results Component Value Date/Time Culture result: NO GROWTH 5 DAYS 11/19/2018 03:35 PM  
 Culture result: NO GROWTH 5 DAYS 11/16/2018 08:32 PM  
 Culture result: NO GROWTH 5 DAYS 06/03/2017 05:16 AM  
 
Lab Results Component Value Date/Time Specimen Description: NARES 05/30/2013 05:53 PM  
 Specimen Description: URINE 11/16/2012 05:15 PM  
 Specimen Description: URINE 09/17/2012 01:35 AM  
 
No results for input(s): FE, TIBC, PSAT, FERR in the last 72 hours. Lab Results Component Value Date/Time Sodium,urine random 115 07/14/2011 07:20 PM  
 Creatinine, urine 52.2 07/14/2011 07:20 PM  
 
 
 
 
Bassam Esquivel MD 
[facility] 
 [email or web address] [place] [address] [employer] 
 [place], [address] Phone - [phone] Fax - [phone]

## 2019-01-16 NOTE — ED PROVIDER NOTES
EMERGENCY DEPARTMENT HISTORY AND PHYSICAL EXAM 
 
 
Date: 1/16/2019 Patient Name: Amirah Guerrero History of Presenting Illness Chief Complaint Patient presents with  Abdominal Pain Patient is a dialysis patient and thinks he has another blood infection. He was in the hospital over Thanksgiving for an infection. Last night he spiked a fever to 103 and took some tylenol but is still feeling awful.  Back Pain  Fever History Provided By: Patient HPI: Amirah Guerrero, 41 y.o. male with PMHx significant for CAD, CKD, HTN, and sz, presents himself to the ED with cc of constant, aching left sided abdominal pain radiating to back since Monday (1/14/19). Pt reports associated SOB, fever (103F), nausea, vomiting, rhinorrhea, productive cough, and wheezing since onset of pain. He notes no improvement of sx with taking Tylenol. Pt states he recently was in the hospital for sepsis for an infection in November 2018. He is currently a dialysis where he receives dialysis at home (all weekdays). Pt reports of receiving his flu shot this season. He specifically denies sore throat. There are no other complaints, changes, or physical findings at this time. PCP: Maurita Dubin, MD  
Nephrology: Dr. Polanco [place] Current Facility-Administered Medications Medication Dose Route Frequency Provider Last Rate Last Dose  piperacillin-tazobactam (ZOSYN) 3.375 g in 0.9% sodium chloride (MBP/ADV) 100 mL  3.375 g IntraVENous NOW Lia Narvaez S, DO      
 vancomycin (VANCOCIN) 985.5 mg in 0.9% sodium chloride 250 mL IVPB  15 mg/kg IntraVENous NOW Lia Flores, DO      
 ibuprofen (MOTRIN) tablet 600 mg  600 mg Oral ONCE Garkatiana Punch, DO      
 
Current Outpatient Medications Medication Sig Dispense Refill  sevelamer carbonate (RENVELA) 800 mg tab tab Take 3 Tabs by mouth three (3) times daily (with meals).  90 Tab 0  
  sevelamer carbonate (RENVELA) 800 mg tab tab Take 1,600 mg by mouth three (3) times daily (with meals). Only takes if eating a  meal    
 warfarin (COUMADIN) 2.5 mg tablet Take 2.5 mg by mouth every Tuesday and Thursday.  warfarin (COUMADIN) 5 mg tablet Take 5 mg by mouth five (5) days a week. All days except Tuesday and Thursday, patient takes 2.5 mg    
 metoprolol succinate (TOPROL-XL) 25 mg XL tablet Take 12.5 mg by mouth daily.  oxyCODONE-acetaminophen (PERCOCET) 5-325 mg per tablet Take 1-2 Tabs by mouth every four (4) hours as needed for Pain. Max Daily Amount: 12 Tabs. 30 Tab 0  
 HYDROcodone-acetaminophen (NORCO) 5-325 mg per tablet Take 1 Tab by mouth every four (4) hours as needed for Pain. Max Daily Amount: 6 Tabs. 20 Tab 0  
 ondansetron (ZOFRAN ODT) 4 mg disintegrating tablet Take 1 Tab by mouth every eight (8) hours as needed for Nausea. 10 Tab 0  
 amLODIPine (NORVASC) 2.5 mg tablet Take 2.5 mg by mouth daily.  omeprazole (PRILOSEC) 20 mg capsule Take 20 mg by mouth daily.  calcitRIOL (ROCALTROL) 0.5 mcg capsule Take 0.5 mcg by mouth daily.  levETIRAcetam (KEPPRA) 750 mg tablet Take 750 mg by mouth two (2) times a day.  lacosamide (VIMPAT) 50 mg tab tablet Take 50 mg by mouth two (2) times a day.  atorvastatin (LIPITOR) 20 mg tablet Take 20 mg by mouth daily.  aspirin 81 mg chewable tablet Take 81 mg by mouth daily. Past History Past Medical History: 
Past Medical History:  
Diagnosis Date  Abdominal hematoma  AICD (automatic cardioverter/defibrillator) present  CAD (coronary artery disease)   
 anterior MI s/p 2 stents  2007  Chronic abdominal pain  Chronic kidney disease ESRD; Dialysis dependent. Home [facility] does labs  DVT (deep venous thrombosis) ([facility] [address] 2001  DVT of popliteal vein ([facility] [address] 11/2011  
 not anticoagulated due to bleeding, IVC filter  Gallstone pancreatitis  Gastrointestinal disorder acid reflux  Gastrointestinal disorder   
 peptic ulcer  Hemodialysis patient ([facility] [address]  High cholesterol  Hypertension  Kidney transplant b/l kidneys 1995, 2001  Nephrotic syndrome  Other ill-defined conditions(799.89)   
 kidny transplant x2,  on dialysis  Other ill-defined conditions(799.89)   
 home dialysis  Other ill-defined conditions(799.89)   
 hx recurrent left leg DVT  Peritonitis ([facility] [address]  Seizures ([facility] [address] 2015  
 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 
Past Surgical History:  
Procedure Laterality Date  CARDIAC SURG PROCEDURE UNLIST  2007  
 2 stents  HX APPENDECTOMY  HX CHOLECYSTECTOMY  HX OTHER SURGICAL    
 cholecystectomy  HX OTHER SURGICAL  11/2011  
 exploratory laparotomy  HX OTHER SURGICAL    
 fem-pop  HX OTHER SURGICAL  02/2013  
 right upper extremity fistula  HX PACEMAKER  6/2009 AICD  HX SMALL BOWEL RESECTION    
 HX TRANSPLANT  2001 Kidney  HX TRANSPLANT  1992  
 kidney  HX UROLOGICAL    
 2 kidney transplants  MO EDG US EXAM SURGICAL ALTER STOM DUODENUM/JEJUNUM  7/15/2011  MO ESOPHAGOGASTRODUODENOSCOPY TRANSORAL DIAGNOSTIC  5/24/2012  VASCULAR SURGERY PROCEDURE UNLIST  11/14/2017 Insertion AV graft right upper arm (bovine); ligation of AV fistula right arm Family History: 
Family History Problem Relation Age of Onset  COPD Mother  Lung Disease Mother  Cancer Father   
     stomach  Cancer Maternal Grandmother Social History: 
Social History Tobacco Use  Smoking status: Never Smoker  Smokeless tobacco: Never Used Substance Use Topics  Alcohol use: No  
 Drug use: No  
 
 
Allergies: Allergies Allergen Reactions  Shellfish Containing Products Swelling Break out in rash, trouble breathing  Heparin Analogues Other (comments) Positive history of HIT  
 Iodinated Contrast- Oral And Iv Dye Other (comments) Because of renal disease. No rash or hives per patient report 1/14/2012 Review of Systems Review of Systems Constitutional: Positive for fever (103F). Negative for chills and fatigue. HENT: Positive for rhinorrhea. Negative for congestion and sore throat. Eyes: Negative for visual disturbance. Respiratory: Positive for cough (productive), shortness of breath and wheezing. Cardiovascular: Negative for chest pain and palpitations. Gastrointestinal: Positive for abdominal pain (left sided), nausea and vomiting. Negative for abdominal distention, constipation and diarrhea. Endocrine: Negative. Genitourinary: Negative for difficulty urinating and dysuria. Musculoskeletal: Negative. Skin: Negative for rash. Neurological: Negative for dizziness, weakness and light-headedness. Psychiatric/Behavioral: Negative for suicidal ideas. Physical Exam  
Physical Exam  
Constitutional: He is oriented to person, place, and time. He appears well-developed and well-nourished. No distress. Uncomfortable appearing HENT:  
Head: Normocephalic and atraumatic. Mouth/Throat: Oropharynx is clear and moist.  
Eyes: Conjunctivae and EOM are normal.  
Neck: Neck supple. No JVD present. No tracheal deviation present. Cardiovascular: Regular rhythm and intact distal pulses. Tachycardia present. Exam reveals no gallop and no friction rub. No murmur heard. Pulmonary/Chest: Effort normal. No stridor. No respiratory distress. He has wheezes (mild expiratory; right upper lung). Abdominal: Soft. Bowel sounds are normal. He exhibits no distension and no mass. There is tenderness (general). There is no rebound and no guarding. Musculoskeletal: Normal range of motion. He exhibits no edema or tenderness. No deformity. Fistula to RUE. Neurological: He is alert and oriented to person, place, and time. He has normal strength. No focal deficits Skin: Skin is warm, dry and intact. No rash noted. Psychiatric: He has a normal mood and affect. His behavior is normal. Judgment and thought content normal.  
Nursing note and vitals reviewed. Diagnostic Study Results Labs - Recent Results (from the past 12 hour(s)) CBC WITH AUTOMATED DIFF Collection Time: 01/16/19  7:09 AM  
Result Value Ref Range WBC 6.5 4.1 - 11.1 K/uL  
 RBC 4.24 4.10 - 5.70 M/uL  
 HGB 13.4 12.1 - 17.0 g/dL HCT 40.3 36.6 - 50.3 % MCV 95.0 80.0 - 99.0 FL  
 MCH 31.6 26.0 - 34.0 PG  
 MCHC 33.3 30.0 - 36.5 g/dL  
 RDW 15.9 (H) 11.5 - 14.5 % PLATELET 433 368 - 967 K/uL MPV 10.4 8.9 - 12.9 FL  
 NRBC 0.0 0  WBC ABSOLUTE NRBC 0.00 0.00 - 0.01 K/uL NEUTROPHILS 75 32 - 75 % LYMPHOCYTES 7 (L) 12 - 49 % MONOCYTES 11 5 - 13 % EOSINOPHILS 6 0 - 7 % BASOPHILS 1 0 - 1 % IMMATURE GRANULOCYTES 0 0.0 - 0.5 % ABS. NEUTROPHILS 4.8 1.8 - 8.0 K/UL  
 ABS. LYMPHOCYTES 0.5 (L) 0.8 - 3.5 K/UL  
 ABS. MONOCYTES 0.7 0.0 - 1.0 K/UL  
 ABS. EOSINOPHILS 0.4 0.0 - 0.4 K/UL  
 ABS. BASOPHILS 0.1 0.0 - 0.1 K/UL  
 ABS. IMM. GRANS. 0.0 0.00 - 0.04 K/UL  
 DF AUTOMATED    
 RBC COMMENTS NORMOCYTIC, NORMOCHROMIC METABOLIC PANEL, COMPREHENSIVE Collection Time: 01/16/19  7:09 AM  
Result Value Ref Range Sodium 130 (L) 136 - 145 mmol/L Potassium 3.2 (L) 3.5 - 5.1 mmol/L Chloride 90 (L) 97 - 108 mmol/L  
 CO2 26 21 - 32 mmol/L Anion gap 14 5 - 15 mmol/L Glucose 96 65 - 100 mg/dL BUN 39 (H) 6 - 20 MG/DL Creatinine 11.00 (H) 0.70 - 1.30 MG/DL  
 BUN/Creatinine ratio 4 (L) 12 - 20 GFR est AA 6 (L) >60 ml/min/1.73m2 GFR est non-AA 5 (L) >60 ml/min/1.73m2 Calcium 9.6 8.5 - 10.1 MG/DL Bilirubin, total 0.5 0.2 - 1.0 MG/DL  
 ALT (SGPT) 19 12 - 78 U/L  
 AST (SGOT) 24 15 - 37 U/L Alk.  phosphatase 120 (H) 45 - 117 U/L  
 Protein, total 10.2 (H) 6.4 - 8.2 g/dL Albumin 4.4 3.5 - 5.0 g/dL Globulin 5.8 (H) 2.0 - 4.0 g/dL A-G Ratio 0.8 (L) 1.1 - 2.2 INFLUENZA A & B AG (RAPID TEST) Collection Time: 01/16/19  8:22 AM  
Result Value Ref Range Influenza A Antigen NEGATIVE  NEG Influenza B Antigen NEGATIVE  NEG    
POC LACTIC ACID Collection Time: 01/16/19  8:22 AM  
Result Value Ref Range Lactic Acid (POC) 1.04 0.40 - 2.00 mmol/L  
EKG, 12 LEAD, INITIAL Collection Time: 01/16/19  8:43 AM  
Result Value Ref Range Ventricular Rate 98 BPM  
 Atrial Rate 98 BPM  
 P-R Interval 166 ms  
 QRS Duration 100 ms Q-T Interval 390 ms QTC Calculation (Bezet) 497 ms Calculated P Axis 73 degrees Calculated R Axis 78 degrees Calculated T Axis -161 degrees Diagnosis Normal sinus rhythm Possible Left atrial enlargement Left ventricular hypertrophy with repolarization abnormality Prolonged QT When compared with ECG of 01-JUN-2017 12:06, No significant change was found Radiologic Studies -  
CT Results  (Last 48 hours) 01/16/19 0803  CT ABD PELV WO CONT Final result Impression:  IMPRESSION:  
Mild airspace disease in the right lower lobe could represent early pneumonia. No evidence of acute abdominal or pelvic process. Stable incidental findings as  
detailed above. Narrative:  EXAM: CT ABD PELV WO CONT INDICATION: Abdominal pain; abdominal pain, fever COMPARISON: November 19, 2018 CONTRAST:  None. TECHNIQUE:   
Thin axial images were obtained through the abdomen and pelvis. Coronal and  
sagittal reconstructions were generated. Oral contrast was not administered. CT  
dose reduction was achieved through use of a standardized protocol tailored for  
this examination and automatic exposure control for dose modulation. The absence of intravenous contrast material reduces the sensitivity for evaluation of the solid parenchymal organs of the abdomen. FINDINGS:   
LUNG BASES: Mild airspace disease in the right lower lobe. INCIDENTALLY IMAGED HEART AND MEDIASTINUM: AICD in unchanged position. LIVER: No mass or biliary dilatation. GALLBLADDER: Surgically absent. SPLEEN: Lobulated contour, unchanged. PANCREAS: No mass or ductal dilatation. ADRENALS: Unremarkable. KIDNEYS/URETERS: Atrophic native kidneys. Small bilateral renal cysts. Bilateral  
renal cortical calcifications and renal vascular calcifications. No visualized  
urinary tract calculi. Small, calcified transplanted kidneys in the pelvis  
bilaterally. STOMACH: Unremarkable. SMALL BOWEL: No dilatation or wall thickening. COLON: No dilatation or wall thickening. Moderate amount of stool in the right  
colon. APPENDIX: Surgically absent. PERITONEUM: No ascites or pneumoperitoneum. RETROPERITONEUM: No lymphadenopathy. Atherosclerotic abdominal aorta. Left iliac  
stent in unchanged position. Peripherally calcified mass in the mesentery  
(series 2, image 38) measures 3.0 x 2.2 cm, not significantly changed. REPRODUCTIVE ORGANS: Normal sized prostate gland. URINARY BLADDER: Decompressed. BONES: Diffuse sclerosis, compatible with renal osteodystrophy. ADDITIONAL COMMENTS: N/A  
   
  
  
 
CXR Results  (Last 48 hours) 01/16/19 8291  XR CHEST PA LAT Final result Impression:  IMPRESSION: No acute findings. Narrative:  History: Sepsis. 2 views of the chest demonstrate the defibrillator placement. Heart size appears  
normal. The lungs are clear. There are no effusions. The osseous structures  
appear normal. There are stents in the right upper extremity. There are clips in  
the right upper quadrant. Medical Decision Making I am the first provider for this patient.  
 
I reviewed the vital signs, available nursing notes, past medical history, past surgical history, family history and social history. Vital Signs-Reviewed the patient's vital signs. Patient Vitals for the past 12 hrs: 
 Temp Pulse Resp BP SpO2  
01/16/19 0636 (!) 102.5 °F (39.2 °C) (!) 103 18 126/83 98 % EKG interpretation: (Preliminary) 8:43 Rhythm: normal sinus rhythm; and regular . Rate (approx.): 98; Axis: normal; LA interval: normal; QRS interval: normal ; ST/T wave: T wave inversions in lateral leads; Other findings: possible left atrial enlargement, left ventricular hypertrophy with repolarization abnormality, prolonged QT Written by Alexander Baptiste, as dictated by, Jamil Perry DO 
 
Records Reviewed: Nursing Notes and Old Medical Records Provider Notes (Medical Decision Making): Pt presenting with fever, cough, myalgia and abdominal pain. Per chart review aptient was admitted in November 2018 for sepsis likely secondary to infected femoral HD line. DDx includes HAP, diverticulitis, gastroenteritis, intraabdominal abscess. Will check labs, blood cultures, CXR, lactic acid. Likely admit. ED Course:  
Initial assessment performed. The patients presenting problems have been discussed, and they are in agreement with the care plan formulated and outlined with them. I have encouraged them to ask questions as they arise throughout their visit. MEDICATIONS GIVEN: 
Medications  
piperacillin-tazobactam (ZOSYN) 3.375 g in 0.9% sodium chloride (MBP/ADV) 100 mL (not administered)  
vancomycin (VANCOCIN) 985.5 mg in 0.9% sodium chloride 250 mL IVPB (not administered) ibuprofen (MOTRIN) tablet 600 mg (not administered)  
sodium chloride 0.9 % bolus infusion 500 mL (500 mL IntraVENous New Bag 1/16/19 2506) ondansetron (ZOFRAN) injection 4 mg (4 mg IntraVENous Given 1/16/19 0750) morphine 10 mg/ml injection 4 mg (4 mg IntraVENous Given 1/16/19 0750)  
acetaminophen (TYLENOL) tablet 650 mg (650 mg Oral Given 1/16/19 1206) albuterol-ipratropium (DUO-NEB) 2.5 MG-0.5 MG/3 ML (3 mL Nebulization Given 1/16/19 0833) PROVIDER SEPSIS PHYSICAL EXAM EVAL 
7:13 AM - I suspect that this patient has an active infection. Vital signs reviewed (see nursing documentation for further details): 
Patient Vitals for the past 24 hrs: 
 Temp Pulse Resp BP SpO2  
01/16/19 0636 (!) 102.5 °F (39.2 °C) (!) 103 18 126/83 98 % Cardiac exam:Tachycardic Pulmonary exam:Wheezes Peripheral pulses:Normal 
Capillary refill:Normal 
Skin exam:pink Exam performed Hanh Fisher DO 
 
SEP-1 Core Measure Exclusion Criteria Elevated SCr due toESRD Has the patient/family refused IV fluids? no  
 
PROGRESS NOTE:  
8:39 AM 
Per nursing staff, pt is now complaining of chest tightness after a short period of unconsciousness. Will interrogate pacemaker defibrillator as pt had a brief run of V-tach. Will get EKG and cardiac enzymes. Consult Note: 
9:06 AM 
Jamil Perry DO spoke with Dr. Lynn Oliva, Specialty: Hospitalist 
Discussed pt's hx, disposition, and available diagnostic and imaging results. Reviewed care plans. Consultant agrees with plans as outlined. Will admit pt. PROGRESS NOTE:  
9:20 AM 
Per [facility] representative, pt's pacemaker is normal. 
 
9:57 AM 
Per nursing, pt's SpO2 dropped to 88% when sleeping. Upon placing pt on 2L, pt's SpO2 maggy to 94-95%. Admit Note: 
9:11 AM 
Pt is being admitted by Dr. Lynn Oliva. The results of their tests and reason(s) for their admission have been discussed with pt and/or available family. They convey agreement and understanding for the need to be admitted and for admission diagnosis. PLAN: 
1. Admit to Hospitalist.  
Diagnosis Clinical Impression: 1. Sepsis, due to unspecified organism ([address] 2. Hospital-acquired pneumonia Attestations:  
This note is prepared by [employer], acting as Scribe for Jamil Perry DO 
 
 Ebony Ricardo DO: The scribe's documentation has been prepared under my direction and personally reviewed by me in its entirety. I confirm that the note above accurately reflects all work, treatment, procedures, and medical decision making performed by me.

## 2019-01-16 NOTE — PROGRESS NOTES
[facility]  Dosing of Warfarin Indication: DVT Goal INR: 2-3 PTA Warfarin Dose: Per PTA med list: 
2.5mg Tabs 5mg [id number] 
2.5mg T&Th 
 
Concurrent anticoagulants:  
Concurrent antiplatelet:  
 
Major Interacting Medications ? Drugs that may increase INR:  
? Drugs that may decrease INR:  
 
Conditions that may increase/decrease INR (CHF, C. diff, cirrhosis, thyroid disorder, hypoalbuminemia):  
 
Labs: 
Recent Labs  
  01/16/19 
1702 01/16/19 
0709 INR 1.7*  --   
HGB  --  13.4 PLT  --  184 SGOT  --  24 TBILI  --  0.5 ALB  --  4.4 Impression/Plan: - Will give Warfarin 7.5mg \"booster\" - Daily INR 
- CBC w/o diff every other day Thanks Rosa M Powers [facility] 
 
[email or web address]

## 2019-01-16 NOTE — ED NOTES
Pt had short run of VTach notified MD Estelita Mtz. 12 lead ordered and pacer interrogated. Awaiting results pt see MD Destini Vo

## 2019-01-16 NOTE — ED NOTES
Bedside and Verbal shift change report given to Trina Day RN (oncoming nurse) by Chato Encinas RN(offgoing nurse). Report included the following information SBAR, Kardex, MAR and Recent Results.

## 2019-01-16 NOTE — CONSULTS
Blaise Leblanc         NAME:Claude Velia Gal  CKJ:[id number]   :1975                       Pt seen and examined  esrd - home hd - MCV pt  Hd tomorrow    Virgilio Lewis MD  [facility]  [facility] [address], [address]  [place], [address]  Phone - [phone]         Fax - [phone] [place] Office  [address] [place] [address]  Phone - [phone]        Fax - [phone]     [email or web address]

## 2019-01-16 NOTE — H&P
GENERAL GENERIC H&P/CONSULT Subjective: 
Glenys Jimenez, 41 y.o. male with PMHx significant for CAD, CKD, HTN, and sz, presents himself to the ED with cc of constant, aching left sided abdominal pain radiating to back since Monday (1/14/19). Pt reports associated SOB, fever (103F), nausea, vomiting, rhinorrhea, productive cough, and wheezing since onset of pain. He notes no improvement of sx with taking Tylenol. Pt states he recently was in the hospital for sepsis for an infection in November 2018. He is currently a dialysis where he receives dialysis at home (all weekdays). Pt reports of receiving his flu shot this season. He specifically denies sore throat Past Medical History:  
Diagnosis Date  Abdominal hematoma  AICD (automatic cardioverter/defibrillator) present  CAD (coronary artery disease)   
 anterior MI s/p 2 stents  2007  Chronic abdominal pain  Chronic kidney disease ESRD; Dialysis dependent. Home [facility] does labs  DVT (deep venous thrombosis) ([address] 2001  DVT of popliteal vein ([address] 11/2011  
 not anticoagulated due to bleeding, IVC filter  Gallstone pancreatitis  Gastrointestinal disorder   
 acid reflux  Gastrointestinal disorder   
 peptic ulcer  Hemodialysis patient ([address]  High cholesterol  Hypertension  Kidney transplant b/l kidneys 1995, 2001  Nephrotic syndrome  Other ill-defined conditions(799.89)   
 kidny transplant x2,  on dialysis  Other ill-defined conditions(799.89)   
 home dialysis  Other ill-defined conditions(799.89)   
 hx recurrent left leg DVT  Peritonitis ([address]  Seizures ([address] 2015  
 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  
Procedure Laterality Date  CARDIAC SURG PROCEDURE UNLIST  2007  
 2 stents  HX APPENDECTOMY  HX CHOLECYSTECTOMY  HX OTHER SURGICAL    
 cholecystectomy  HX OTHER SURGICAL  11/2011  
 exploratory laparotomy  HX OTHER SURGICAL    
 fem-pop  HX OTHER SURGICAL  02/2013  
 right upper extremity fistula  HX PACEMAKER  6/2009 AICD  HX SMALL BOWEL RESECTION    
 HX TRANSPLANT  2001 Kidney  HX TRANSPLANT  1992  
 kidney  HX UROLOGICAL    
 2 kidney transplants  SC EDG US EXAM SURGICAL ALTER STOM DUODENUM/JEJUNUM  7/15/2011  SC ESOPHAGOGASTRODUODENOSCOPY TRANSORAL DIAGNOSTIC  5/24/2012  VASCULAR SURGERY PROCEDURE UNLIST  11/14/2017 Insertion AV graft right upper arm (bovine); ligation of AV fistula right arm Prior to Admission medications Medication Sig Start Date End Date Taking? Authorizing Provider  
sevelamer carbonate (RENVELA) 800 mg tab tab Take 3 Tabs by mouth three (3) times daily (with meals). 11/23/18   David Killian MD  
sevelamer carbonate (RENVELA) 800 mg tab tab Take 1,600 mg by mouth three (3) times daily (with meals). Only takes if eating a  meal    Provider, Historical  
warfarin (COUMADIN) 2.5 mg tablet Take 2.5 mg by mouth every Tuesday and Thursday. Provider, Historical  
warfarin (COUMADIN) 5 mg tablet Take 5 mg by mouth five (5) days a week. All days except Tuesday and Thursday, patient takes 2.5 mg    Provider, Historical  
metoprolol succinate (TOPROL-XL) 25 mg XL tablet Take 12.5 mg by mouth daily. Provider, Historical  
oxyCODONE-acetaminophen (PERCOCET) 5-325 mg per tablet Take 1-2 Tabs by mouth every four (4) hours as needed for Pain. Max Daily Amount: 12 Tabs. 11/14/17   Frieda Addison MD  
HYDROcodone-acetaminophen [facility]) 5-325 mg per tablet Take 1 Tab by mouth every four (4) hours as needed for Pain. Max Daily Amount: 6 Tabs. 9/23/17   Binta Merchant MD  
ondansetron (ZOFRAN ODT) 4 mg disintegrating tablet Take 1 Tab by mouth every eight (8) hours as needed for Nausea.  8/31/17   Joel King MD  
 amLODIPine (NORVASC) 2.5 mg tablet Take 2.5 mg by mouth daily. Morro Padron MD  
omeprazole (PRILOSEC) 20 mg capsule Take 20 mg by mouth daily. Provider, Historical  
calcitRIOL (ROCALTROL) 0.5 mcg capsule Take 0.5 mcg by mouth daily. Provider, Historical  
levETIRAcetam (KEPPRA) 750 mg tablet Take 750 mg by mouth two (2) times a day. Morro Padron MD  
lacosamide (VIMPAT) 50 mg tab tablet Take 50 mg by mouth two (2) times a day. Morro Padron MD  
atorvastatin (LIPITOR) 20 mg tablet Take 20 mg by mouth daily. Morro Padron MD  
aspirin 81 mg chewable tablet Take 81 mg by mouth daily. Morro Padron MD  
 
Allergies Allergen Reactions  Shellfish Containing Products Swelling Break out in rash, trouble breathing  Heparin Analogues Other (comments) Positive history of HIT  
 Iodinated Contrast- Oral And Iv Dye Other (comments) Because of renal disease. No rash or hives per patient report 1/14/2012 Social History Tobacco Use  Smoking status: Never Smoker  Smokeless tobacco: Never Used Substance Use Topics  Alcohol use: No  
  
Family History Problem Relation Age of Onset  COPD Mother  Lung Disease Mother  Cancer Father   
     stomach  Cancer Maternal Grandmother Review of Systems Constitutional: Positive for chills, diaphoresis, fatigue and fever. Objective: No intake/output data recorded. No intake/output data recorded. Patient Vitals for the past 8 hrs: 
 BP Temp Pulse Resp SpO2 Height Weight 01/16/19 0952  (!) 100.9 °F (38.3 °C)       
01/16/19 0945 98/55  (!) 106 25 92 %    
01/16/19 0930 111/62  (!) 106 22 96 %    
01/16/19 0915 105/57 (!) 103 °F (39.4 °C) (!) 104 25 96 %    
01/16/19 0900 110/64  (!) 103 18 95 %    
01/16/19 0845 112/57  (!) 102 25 92 %    
01/16/19 0830 119/77  (!) 102 27 94 %    
01/16/19 0826 121/74  100 27 94 %   01/16/19 0636 126/83 (!) 102.5 °F (39.2 °C) (!) 103 18 98 % 5' 7\" (1.702 m) 65.7 kg (144 lb 13.5 oz) Physical Exam  
Constitutional: He appears well-developed and well-nourished. HENT:  
Head: Normocephalic and atraumatic. Right Ear: External ear normal.  
Left Ear: External ear normal.  
Nose: Nose normal.  
Mouth/Throat: Oropharynx is clear and moist.  
Eyes: EOM are normal. Pupils are equal, round, and reactive to light. Neck: Normal range of motion. Neck supple. Cardiovascular: Normal rate, regular rhythm, normal heart sounds and intact distal pulses. Pulmonary/Chest: Effort normal and breath sounds normal.  
Abdominal: Soft. Bowel sounds are normal.  
Musculoskeletal: Normal range of motion. Neurological: He is alert. Skin: Skin is warm. Labs:   
Recent Results (from the past 24 hour(s)) CBC WITH AUTOMATED DIFF Collection Time: 01/16/19  7:09 AM  
Result Value Ref Range WBC 6.5 4.1 - 11.1 K/uL  
 RBC 4.24 4.10 - 5.70 M/uL  
 HGB 13.4 12.1 - 17.0 g/dL HCT 40.3 36.6 - 50.3 % MCV 95.0 80.0 - 99.0 FL  
 MCH 31.6 26.0 - 34.0 PG  
 MCHC 33.3 30.0 - 36.5 g/dL  
 RDW 15.9 (H) 11.5 - 14.5 % PLATELET 744 472 - 071 K/uL MPV 10.4 8.9 - 12.9 FL  
 NRBC 0.0 0  WBC ABSOLUTE NRBC 0.00 0.00 - 0.01 K/uL NEUTROPHILS 75 32 - 75 % LYMPHOCYTES 7 (L) 12 - 49 % MONOCYTES 11 5 - 13 % EOSINOPHILS 6 0 - 7 % BASOPHILS 1 0 - 1 % IMMATURE GRANULOCYTES 0 0.0 - 0.5 % ABS. NEUTROPHILS 4.8 1.8 - 8.0 K/UL  
 ABS. LYMPHOCYTES 0.5 (L) 0.8 - 3.5 K/UL  
 ABS. MONOCYTES 0.7 0.0 - 1.0 K/UL  
 ABS. EOSINOPHILS 0.4 0.0 - 0.4 K/UL  
 ABS. BASOPHILS 0.1 0.0 - 0.1 K/UL  
 ABS. IMM. GRANS. 0.0 0.00 - 0.04 K/UL  
 DF AUTOMATED    
 RBC COMMENTS NORMOCYTIC, NORMOCHROMIC METABOLIC PANEL, COMPREHENSIVE Collection Time: 01/16/19  7:09 AM  
Result Value Ref Range Sodium 130 (L) 136 - 145 mmol/L Potassium 3.2 (L) 3.5 - 5.1 mmol/L  Chloride 90 (L) 97 - 108 mmol/L  
 CO2 26 21 - 32 mmol/L Anion gap 14 5 - 15 mmol/L Glucose 96 65 - 100 mg/dL BUN 39 (H) 6 - 20 MG/DL Creatinine 11.00 (H) 0.70 - 1.30 MG/DL  
 BUN/Creatinine ratio 4 (L) 12 - 20 GFR est AA 6 (L) >60 ml/min/1.73m2 GFR est non-AA 5 (L) >60 ml/min/1.73m2 Calcium 9.6 8.5 - 10.1 MG/DL Bilirubin, total 0.5 0.2 - 1.0 MG/DL  
 ALT (SGPT) 19 12 - 78 U/L  
 AST (SGOT) 24 15 - 37 U/L Alk. phosphatase 120 (H) 45 - 117 U/L Protein, total 10.2 (H) 6.4 - 8.2 g/dL Albumin 4.4 3.5 - 5.0 g/dL Globulin 5.8 (H) 2.0 - 4.0 g/dL A-G Ratio 0.8 (L) 1.1 - 2.2    
TROPONIN I Collection Time: 01/16/19  7:09 AM  
Result Value Ref Range Troponin-I, Qt. <0.05 <0.05 ng/mL CK W/ CKMB & INDEX Collection Time: 01/16/19  7:09 AM  
Result Value Ref Range  39 - 308 U/L  
 CK - MB 2.3 <3.6 NG/ML  
 CK-MB Index 2.0 0 - 2.5 INFLUENZA A & B AG (RAPID TEST) Collection Time: 01/16/19  8:22 AM  
Result Value Ref Range Influenza A Antigen NEGATIVE  NEG Influenza B Antigen NEGATIVE  NEG    
POC LACTIC ACID Collection Time: 01/16/19  8:22 AM  
Result Value Ref Range Lactic Acid (POC) 1.04 0.40 - 2.00 mmol/L  
EKG, 12 LEAD, INITIAL Collection Time: 01/16/19  8:43 AM  
Result Value Ref Range Ventricular Rate 98 BPM  
 Atrial Rate 98 BPM  
 P-R Interval 166 ms  
 QRS Duration 100 ms Q-T Interval 390 ms QTC Calculation (Bezet) 497 ms Calculated P Axis 73 degrees Calculated R Axis 78 degrees Calculated T Axis -161 degrees Diagnosis Normal sinus rhythm Possible Left atrial enlargement Left ventricular hypertrophy with repolarization abnormality Prolonged QT When compared with ECG of 01-JUN-2017 12:06, No significant change was found Confirmed by [facility] ([id number]) on 1/16/2019 10:02:16 AM 
  
POC TROPONIN-I Collection Time: 01/16/19  8:53 AM  
Result Value Ref Range Troponin-I (POC) <0.04 0.00 - 0.08 ng/mL Xr Chest Pa Lat Result Date: 1/16/2019 IMPRESSION: No acute findings. Ct Abd Pelv Wo Cont Result Date: 1/16/2019 IMPRESSION: Mild airspace disease in the right lower lobe could represent early pneumonia. No evidence of acute abdominal or pelvic process. Stable incidental findings as detailed above. ECG: NSR rate 95, LVH, non specific ST changes Chest X-Ray: 
 
Assessment: 
Active Problems: * No active hospital problems. * 
 
 
Plan: 1. Right Lower Lobe Pneumonia ; started IV Zosyn / Vancomycin 1. Fever ; as above 2. R/o Sepsis ; lactic acid q 6h 
3. Abdominal Pain ; Abdominal CT negative 5. ESRD ; on HD, nephrology consult 6, CAD ; trop  X 3 
7. S/p AICD Implant ;  
8. Hypertension ; stable 9. H/o Seizure ; on keppra, Vimpat 
10 H/o DVT Lower Extremity ; on Coumadin 11. Hypokalemia ; replace po 
12. Code ; full Signed: 
Dean Ross MD 1/16/2019

## 2019-01-17 LAB
ANION GAP SERPL CALC-SCNC: 12 MMOL/L (ref 5–15)
BUN SERPL-MCNC: 63 MG/DL (ref 6–20)
BUN/CREAT SERPL: 4 (ref 12–20)
CALCIUM SERPL-MCNC: 8.8 MG/DL (ref 8.5–10.1)
CHLORIDE SERPL-SCNC: 93 MMOL/L (ref 97–108)
CO2 SERPL-SCNC: 23 MMOL/L (ref 21–32)
CREAT SERPL-MCNC: 14.1 MG/DL (ref 0.7–1.3)
ERYTHROCYTE [DISTWIDTH] IN BLOOD BY AUTOMATED COUNT: 15.6 % (ref 11.5–14.5)
ERYTHROCYTE [SEDIMENTATION RATE] IN BLOOD: 9 MM/HR (ref 0–15)
GLUCOSE SERPL-MCNC: 97 MG/DL (ref 65–100)
HCT VFR BLD AUTO: 31.7 % (ref 36.6–50.3)
HGB BLD-MCNC: 10.8 G/DL (ref 12.1–17)
INR PPP: 2.3 (ref 0.9–1.1)
MCH RBC QN AUTO: 31.9 PG (ref 26–34)
MCHC RBC AUTO-ENTMCNC: 34.1 G/DL (ref 30–36.5)
MCV RBC AUTO: 93.5 FL (ref 80–99)
NRBC # BLD: 0 K/UL (ref 0–0.01)
NRBC BLD-RTO: 0 PER 100 WBC
PLATELET # BLD AUTO: 142 K/UL (ref 150–400)
PMV BLD AUTO: 10.1 FL (ref 8.9–12.9)
POTASSIUM SERPL-SCNC: 4 MMOL/L (ref 3.5–5.1)
PROTHROMBIN TIME: 23 SEC (ref 9–11.1)
RBC # BLD AUTO: 3.39 M/UL (ref 4.1–5.7)
SODIUM SERPL-SCNC: 128 MMOL/L (ref 136–145)
WBC # BLD AUTO: 5.9 K/UL (ref 4.1–11.1)

## 2019-01-17 PROCEDURE — 36415 COLL VENOUS BLD VENIPUNCTURE: CPT

## 2019-01-17 PROCEDURE — 77010033678 HC OXYGEN DAILY

## 2019-01-17 PROCEDURE — 90935 HEMODIALYSIS ONE EVALUATION: CPT

## 2019-01-17 PROCEDURE — 74011250637 HC RX REV CODE- 250/637: Performed by: INTERNAL MEDICINE

## 2019-01-17 PROCEDURE — 85610 PROTHROMBIN TIME: CPT

## 2019-01-17 PROCEDURE — 74011000258 HC RX REV CODE- 258: Performed by: EMERGENCY MEDICINE

## 2019-01-17 PROCEDURE — 85027 COMPLETE CBC AUTOMATED: CPT

## 2019-01-17 PROCEDURE — 80048 BASIC METABOLIC PNL TOTAL CA: CPT

## 2019-01-17 PROCEDURE — 74011250636 HC RX REV CODE- 250/636: Performed by: EMERGENCY MEDICINE

## 2019-01-17 PROCEDURE — 5A1D70Z PERFORMANCE OF URINARY FILTRATION, INTERMITTENT, LESS THAN 6 HOURS PER DAY: ICD-10-PCS | Performed by: INTERNAL MEDICINE

## 2019-01-17 PROCEDURE — 74011250636 HC RX REV CODE- 250/636: Performed by: INTERNAL MEDICINE

## 2019-01-17 PROCEDURE — 94760 N-INVAS EAR/PLS OXIMETRY 1: CPT

## 2019-01-17 PROCEDURE — 85652 RBC SED RATE AUTOMATED: CPT

## 2019-01-17 PROCEDURE — 65660000000 HC RM CCU STEPDOWN

## 2019-01-17 RX ORDER — WARFARIN SODIUM 5 MG/1
5 TABLET ORAL ONCE
Status: COMPLETED | OUTPATIENT
Start: 2019-01-17 | End: 2019-01-17

## 2019-01-17 RX ORDER — LEVOFLOXACIN 5 MG/ML
750 INJECTION, SOLUTION INTRAVENOUS ONCE
Status: COMPLETED | OUTPATIENT
Start: 2019-01-17 | End: 2019-01-17

## 2019-01-17 RX ORDER — LEVOFLOXACIN 5 MG/ML
500 INJECTION, SOLUTION INTRAVENOUS
Status: DISCONTINUED | OUTPATIENT
Start: 2019-01-19 | End: 2019-01-20

## 2019-01-17 RX ORDER — LEVOFLOXACIN 5 MG/ML
750 INJECTION, SOLUTION INTRAVENOUS
Status: DISCONTINUED | OUTPATIENT
Start: 2019-01-17 | End: 2019-01-17 | Stop reason: DRUGHIGH

## 2019-01-17 RX ADMIN — HYDROCODONE BITARTRATE AND ACETAMINOPHEN 1 TABLET: 5; 325 TABLET ORAL at 21:30

## 2019-01-17 RX ADMIN — LACOSAMIDE 50 MG: 100 TABLET, FILM COATED ORAL at 19:01

## 2019-01-17 RX ADMIN — Medication 10 ML: at 19:03

## 2019-01-17 RX ADMIN — MORPHINE SULFATE 2 MG: 4 INJECTION INTRAVENOUS at 04:44

## 2019-01-17 RX ADMIN — SEVELAMER CARBONATE 1600 MG: 800 TABLET, FILM COATED ORAL at 19:00

## 2019-01-17 RX ADMIN — PIPERACILLIN SODIUM,TAZOBACTAM SODIUM 3.38 G: 3; .375 INJECTION, POWDER, FOR SOLUTION INTRAVENOUS at 19:02

## 2019-01-17 RX ADMIN — PANTOPRAZOLE SODIUM 40 MG: 40 TABLET, DELAYED RELEASE ORAL at 08:53

## 2019-01-17 RX ADMIN — SEVELAMER CARBONATE 1600 MG: 800 TABLET, FILM COATED ORAL at 12:54

## 2019-01-17 RX ADMIN — ATORVASTATIN CALCIUM 20 MG: 10 TABLET, FILM COATED ORAL at 08:54

## 2019-01-17 RX ADMIN — LACOSAMIDE 50 MG: 100 TABLET, FILM COATED ORAL at 08:55

## 2019-01-17 RX ADMIN — Medication 5 ML: at 07:21

## 2019-01-17 RX ADMIN — WARFARIN SODIUM 5 MG: 5 TABLET ORAL at 19:01

## 2019-01-17 RX ADMIN — LEVETIRACETAM 750 MG: 500 TABLET, FILM COATED ORAL at 08:54

## 2019-01-17 RX ADMIN — MORPHINE SULFATE 2 MG: 4 INJECTION INTRAVENOUS at 12:54

## 2019-01-17 RX ADMIN — HYDROCODONE BITARTRATE AND ACETAMINOPHEN 1 TABLET: 5; 325 TABLET ORAL at 08:54

## 2019-01-17 RX ADMIN — VANCOMYCIN HYDROCHLORIDE 750 MG: 750 INJECTION, POWDER, LYOPHILIZED, FOR SOLUTION INTRAVENOUS at 17:47

## 2019-01-17 RX ADMIN — METOPROLOL SUCCINATE 12.5 MG: 25 TABLET, EXTENDED RELEASE ORAL at 09:00

## 2019-01-17 RX ADMIN — ASPIRIN 81 MG 81 MG: 81 TABLET ORAL at 08:54

## 2019-01-17 RX ADMIN — LEVOFLOXACIN 750 MG: 5 INJECTION, SOLUTION INTRAVENOUS at 19:03

## 2019-01-17 RX ADMIN — SEVELAMER CARBONATE 1600 MG: 800 TABLET, FILM COATED ORAL at 08:54

## 2019-01-17 RX ADMIN — MORPHINE SULFATE 2 MG: 4 INJECTION INTRAVENOUS at 23:54

## 2019-01-17 RX ADMIN — LEVETIRACETAM 750 MG: 500 TABLET, FILM COATED ORAL at 19:01

## 2019-01-17 RX ADMIN — MORPHINE SULFATE 2 MG: 4 INJECTION INTRAVENOUS at 19:01

## 2019-01-17 RX ADMIN — OXYCODONE AND ACETAMINOPHEN 2 TABLET: 5; 325 TABLET ORAL at 03:22

## 2019-01-17 RX ADMIN — CALCITRIOL 0.5 MCG: 0.25 CAPSULE ORAL at 08:53

## 2019-01-17 NOTE — PROGRESS NOTES
1855: TRANSFER - OUT REPORT: 
 
Verbal report given to [facility] rn(name) on Jared Levy  being transferred to Oncology rn(unit) for routine progression of care Report consisted of patients Situation, Background, Assessment and  
Recommendations(SBAR). Information from the following report(s) SBAR, Kardex, ED Summary, Procedure Summary, Intake/Output, MAR, Recent Results and Cardiac Rhythm NSR was reviewed with the receiving nurse. Lines:  
Peripheral IV 01/16/19 Left Antecubital (Active) Site Assessment Clean, dry, & intact 1/16/2019  7:36 AM  
Phlebitis Assessment 0 1/16/2019  7:36 AM  
Infiltration Assessment 0 1/16/2019  7:36 AM  
Dressing Status Clean, dry, & intact 1/16/2019  7:36 AM  
Dressing Type Transparent;Tape 1/16/2019  7:36 AM  
  
 
Opportunity for questions and clarification was provided. Patient transported with: 
 Monitor Registered Nurse

## 2019-01-17 NOTE — PROGRESS NOTES
Problem: Pneumonia: Day 2 Goal: Activity/Safety Outcome: Progressing Towards Goal 
Pt remaining in chair to help with breathing and is walking to the bathroom as needed.

## 2019-01-17 NOTE — DIALYSIS
TRANSFER - IN REPORT: 
 
Verbal report received from ANA Loredo RN on Lauryn Patricia  being received from oncology for Hemodialysis Report consisted of patients Situation, Background, Assessment and  
Recommendations(SBAR). Information from the following report(s) Labs, MAR, Vitals  was reviewed with the receiving nurse. Opportunity for questions and clarification was provided. Assessment completed upon patients arrival to unit and care assumed.

## 2019-01-17 NOTE — PROGRESS NOTES
Dosing of Warfarin Indication: DVT, s/p IVC Filter Goal INR: 2-3 PTA Warfarin Dose: Per PTA med list: 
2.5mg Tabs 5mg M-[id number] 
2.5mg T&Th 
 
Concurrent anticoagulants:  
Concurrent antiplatelet:  
 
Major Interacting Medications ? Drugs that may increase INR:  
? Drugs that may decrease INR: in conjunction with warfarin, Levofloxacin Conditions that may increase/decrease INR (CHF, C. diff, cirrhosis, thyroid disorder, hypoalbuminemia):  
 
Labs: 
Recent Labs  
  01/16/19 
1702 01/16/19 
0709 INR 1.7*  --   
HGB  --  13.4 PLT  --  184 SGOT  --  24 TBILI  --  0.5 ALB  --  4.4 Impression/Plan: - Will give 2.5 mg today as per home schedule (had 7.5 mg yesterday as a booster) - Daily INR 
- CBC w/o diff Daily as MD ordered Thanks Rosmery Richardson, [facility]

## 2019-01-17 NOTE — PROGRESS NOTES
Hospitalist Progress Note NAME: Roxana Kirkpatrick :  1975 MRN:  [id number] Assessment / Plan: 
Sepsis with fever, tachypnea and tachycardia POA in ESRD HD patient 
pleuritic Pain (abdominal) Due to Right Lower Lobe Pneumonia as per CT (Pt does have productive cough and shortness of breath) Continue to spike fevers Ask pharmacy to dose IV vancomycin Add Levaquin Continue Zosyn F/u blood cultures Check sputum cultures CT A/P with Mild airspace disease in the right lower lobe could represent early pneumonia. No evidence of acute abdominal or pelvic process. Stable incidental findings as detailed above Continue norco 
 
ESRD on HD (home HD 5 days on weekdays) HD per nephrology while here CAD Continue ASA Tn Negative AICD in place HTN Chronic systolic heart Failure with EF 10% in , 40 % in 2018 Continue metoprolol Not on ACE/ARBs, likely due to BP issues, ? Renal issues, already on HD Fluid Mx with HD per nephrology H/o Seizure DO: continue keppra, Vimpat H/o DVT Lower Extremity: on Coumadin, pharmacy dosing Hypokalemia 
repleted upon admission Monitor and replete PRN Body mass index is 22.75 kg/m². consistent with normal weight Code status: Full Prophylaxis: Coumadin Recommended Disposition: Home w/Family Subjective: Pt seen and examined at bedside. Continue to feel shortness of breath and having fevers. Overnight events d/w RN Chief Complaint / Reason for Physician Visit; f/u \"Sepsis/PNA\" Review of Systems: 
Symptom Y/N Comments  Symptom Y/N Comments Fever/Chills y   Chest Pain Poor Appetite    Edema Cough y   Abdominal Pain y pleuritic Sputum y   Joint Pain SOB/ZAMUDIO y   Pruritis/Rash Nausea/vomit    Tolerating PT/OT Diarrhea    Tolerating Diet y Constipation    Other Could NOT obtain due to:   
 
Objective: VITALS:  
Last 24hrs VS reviewed since prior progress note. Most recent are: Patient Vitals for the past 24 hrs: 
 Temp Pulse Resp BP SpO2  
01/17/19 0738 98.8 °F (37.1 °C) 90 18 112/65 100 % 01/17/19 0305 (!) 101.3 °F (38.5 °C) (!) 101 20 115/71 96 % 01/16/19 2311 98.3 °F (36.8 °C) 92 18 114/72 100 % 01/16/19 1947 98.3 °F (36.8 °C) 93 16 115/74 100 % 01/16/19 1859 98 °F (36.7 °C) 90 14 128/84 100 % 01/16/19 1549 99.2 °F (37.3 °C) 89 14 117/57 100 % 01/16/19 1500  87 24 117/57 93 % 01/16/19 1412 98.7 °F (37.1 °C) 88 15 102/63 99 % 01/16/19 1400  86 25 102/63 95 % 01/16/19 1330  86 17 107/63 98 % 01/16/19 1300  90 16 108/68 94 % 01/16/19 0952 (!) 100.9 °F (38.3 °C)      
01/16/19 0945  (!) 106 25 98/55 92 % 01/16/19 0930  (!) 106 22 111/62 96 % 01/16/19 0915 (!) 103 °F (39.4 °C) (!) 104 25 105/57 96 % 01/16/19 0900  (!) 103 18 110/64 95 % 01/16/19 0845  (!) 102 25 112/57 92 % 01/16/19 0830  (!) 102 27 119/77 94 % 01/16/19 0826  100 27 121/74 94 % Intake/Output Summary (Last 24 hours) at 1/17/2019 0759 Last data filed at 1/17/2019 2517 Gross per 24 hour Intake 720 ml Output  Net 720 ml PHYSICAL EXAM: 
General: WD, WN. Alert, cooperative, no acute distress   
EENT:  EOMI. Anicteric sclerae. MMM Resp:  Minimal crackles at R base, no wheezing or rales. No accessory muscle use CV:  Regular  rhythm,  No edema GI:  Soft, Non distended, Non tender.  +Bowel sounds Neurologic:  Alert and oriented X 3, normal speech, Psych:   Good insight. Not anxious nor agitated Skin:  No rashes. No jaundice Reviewed most current lab test results and cultures  YES Reviewed most current radiology test results   YES Review and summation of old records today    NO Reviewed patient's current orders and MAR    YES 
PMH/SH reviewed - no change compared to H&P 
________________________________________________________________________ Care Plan discussed with: 
  Comments Patient y Family RN y   
Care Manager Consultant Multidiciplinary team rounds were held today with , nursing, pharmacist and clinical coordinator. Patient's plan of care was discussed; medications were reviewed and discharge planning was addressed. ________________________________________________________________________ Total NON critical care TIME:  35  Minutes Total CRITICAL CARE TIME Spent:   Minutes non procedure based Comments >50% of visit spent in counseling and coordination of care    
________________________________________________________________________ Lucho Quintero MD  
 
Procedures: see electronic medical records for all procedures/Xrays and details which were not copied into this note but were reviewed prior to creation of Plan. LABS: 
I reviewed today's most current labs and imaging studies. Pertinent labs include: 
Recent Labs  
  01/16/19 
0709 WBC 6.5 HGB 13.4 HCT 40.3  Recent Labs  
  01/16/19 
1702 01/16/19 
9878 NA  --  130* K  --  3.2*  
CL  --  90* CO2  --  26  
GLU  --  96 BUN  --  39* CREA  --  11.00* CA  --  9.6 ALB  --  4.4 TBILI  --  0.5 SGOT  --  24 ALT  --  19 INR 1.7*  --   
 
 
Signed: Lucho Quintero MD

## 2019-01-17 NOTE — PROCEDURES
[facility] Dialysis Team [facility]  [phone]    Vitals   Pre   Post   Assessment   Pre   Post     Temp  Temp: 98.1 °F (36.7 °C) (01/17/19 1433)  99.1 LOC  A&Ox3 A&Ox3   HR   Pulse (Heart Rate): 86 (01/17/19 1433) 90   Lungs   diminished  diminished   B/P   BP: 107/88 (01/17/19 1433) 143/72 Cardiac   RRR  RRR   Resp   Resp Rate: 16 (01/17/19 1433) 16 Skin   Warm, dry and intact  Warm, dry and intact   Pain level  Pain Intensity 1: 2 (01/17/19 0910) 0 Edema  Lower extremeities   Lower extremeties   Orders:    Duration:   Start:    7260 End:   1812 Total:   3.5 hours   Dialyzer:   Dialyzer/Set Up Inspection: Shyla(lot# [id number] exp. 11--10-) (01/17/19 1433)   K Bath:   Dialysate K (mEq/L): 2 (01/17/19 1433)   Ca Bath:   Dialysate CA (mEq/L): 2.5 (01/17/19 1433)   Na/Bicarb:   Dialysate NA (mEq/L): 140 (01/17/19 1433)   Target Fluid Removal:   Goal/Amount of Fluid to Remove (mL): 1500 mL (01/17/19 1433)   Access     Type & Location:   RUE/AVG. No redness, swelling or drainage present, cleansed with alcohol per policy and procedure, cannulated with x2 (15g) needles and secured with tape per policy and procedure.    Labs     Obtained/Reviewed   Critical Results Called   Date when labs were drawn-  Hgb-    HGB   Date Value Ref Range Status   01/17/2019 10.8 (L) 12.1 - 17.0 g/dL Final     Comment:     INVESTIGATED PER DELTA CHECK PROTOCOL     K-    Potassium   Date Value Ref Range Status   01/17/2019 4.0 3.5 - 5.1 mmol/L Final     Ca-   Calcium   Date Value Ref Range Status   01/17/2019 8.8 8.5 - 10.1 MG/DL Final     Bun-   BUN   Date Value Ref Range Status   01/17/2019 63 (H) 6 - 20 MG/DL Final     Comment:     INVESTIGATED PER DELTA CHECK PROTOCOL     Creat-   Creatinine   Date Value Ref Range Status   01/17/2019 14.10 (H) 0.70 - 1.30 MG/DL Final        Medications/ Blood Products Given     Name   Dose   Route and Time     Vancomycin 750mg  IV- 1748             Blood Volume Processed (BVP):    88.7 L Net Fluid   Removed:  1500 mL   Comments   All dialysis related medications have been reviewed. Assessment performed by RN. Procedure and documentation observed and reviewed by Kelly Calix RN. Time Out Done: 1425   Primary Nurse Rpt Pre:  Glynn Gee RN  Primary Nurse Rpt Post:  Pt Education: Procedural  Care Plan: On going  Tx Summary:  3975:  SBAR received from Primary RN. Pt. arrived to HD suite A&Ox3. VSS. Tx initiated w/o difficulty. 1448:  Pt on cell phone  [phone]:  Pt on cell phone  [phone]:  Pt on cell phone  [phone]:  Pt resting  1548:  Pt resting  1603:  Pt on cell phone  [phone]:  Pt resting  1633:  Pt resting  1648:  Pt resting  1703:  Pt resting  1718: Pt resting  1733:  Pt resting  1748: Tx ended. All possible blood returned to pt w/o difficulty. Both lumens cleaned with chlorhexadine swab pads and capped with sterile caps per policy and procedure. SBAR called to Primary RN. VSS, pt returned to his room. Admiting Diagnosis: Sepsis ([address] pneumonia  Pt's previous clinic- [facility]  Consent signed - Informed Consent Verified: Yes (01/17/19 1433)  Winter Consent - signed and on file  Hepatitis Status-  Negative 2-5-18 >1000 immune  Machine #- Machine Number: [id number] (01/17/19 1433)  Telemetry status- NSR  Pre-dialysis wt. -

## 2019-01-18 ENCOUNTER — APPOINTMENT (OUTPATIENT)
Dept: GENERAL RADIOLOGY | Age: 44
DRG: 871 | End: 2019-01-18
Attending: ANESTHESIOLOGY
Payer: MEDICARE

## 2019-01-18 LAB
ANION GAP SERPL CALC-SCNC: 10 MMOL/L (ref 5–15)
BASOPHILS # BLD: 0.1 K/UL (ref 0–0.1)
BASOPHILS NFR BLD: 1 % (ref 0–1)
BUN SERPL-MCNC: 27 MG/DL (ref 6–20)
BUN/CREAT SERPL: 3 (ref 12–20)
CALCIUM SERPL-MCNC: 9.1 MG/DL (ref 8.5–10.1)
CHLORIDE SERPL-SCNC: 98 MMOL/L (ref 97–108)
CO2 SERPL-SCNC: 24 MMOL/L (ref 21–32)
CREAT SERPL-MCNC: 8.02 MG/DL (ref 0.7–1.3)
DIFFERENTIAL METHOD BLD: ABNORMAL
EOSINOPHIL # BLD: 1.1 K/UL (ref 0–0.4)
EOSINOPHIL NFR BLD: 21 % (ref 0–7)
ERYTHROCYTE [DISTWIDTH] IN BLOOD BY AUTOMATED COUNT: 15.7 % (ref 11.5–14.5)
GLUCOSE SERPL-MCNC: 87 MG/DL (ref 65–100)
HCT VFR BLD AUTO: 34.2 % (ref 36.6–50.3)
HGB BLD-MCNC: 11.4 G/DL (ref 12.1–17)
IMM GRANULOCYTES # BLD AUTO: 0 K/UL (ref 0–0.04)
IMM GRANULOCYTES NFR BLD AUTO: 0 % (ref 0–0.5)
INR PPP: 3.4 (ref 0.9–1.1)
LYMPHOCYTES # BLD: 1.1 K/UL (ref 0.8–3.5)
LYMPHOCYTES NFR BLD: 22 % (ref 12–49)
MCH RBC QN AUTO: 31.8 PG (ref 26–34)
MCHC RBC AUTO-ENTMCNC: 33.3 G/DL (ref 30–36.5)
MCV RBC AUTO: 95.5 FL (ref 80–99)
MONOCYTES # BLD: 0.8 K/UL (ref 0–1)
MONOCYTES NFR BLD: 16 % (ref 5–13)
NEUTS SEG # BLD: 1.9 K/UL (ref 1.8–8)
NEUTS SEG NFR BLD: 40 % (ref 32–75)
NRBC # BLD: 0 K/UL (ref 0–0.01)
NRBC BLD-RTO: 0 PER 100 WBC
PLATELET # BLD AUTO: 148 K/UL (ref 150–400)
POTASSIUM SERPL-SCNC: 4.3 MMOL/L (ref 3.5–5.1)
PROTHROMBIN TIME: 32.8 SEC (ref 9–11.1)
RBC # BLD AUTO: 3.58 M/UL (ref 4.1–5.7)
SODIUM SERPL-SCNC: 132 MMOL/L (ref 136–145)
WBC # BLD AUTO: 5 K/UL (ref 4.1–11.1)

## 2019-01-18 PROCEDURE — 36556 INSERT NON-TUNNEL CV CATH: CPT | Performed by: ANESTHESIOLOGY

## 2019-01-18 PROCEDURE — 71045 X-RAY EXAM CHEST 1 VIEW: CPT

## 2019-01-18 PROCEDURE — C1751 CATH, INF, PER/CENT/MIDLINE: HCPCS

## 2019-01-18 PROCEDURE — 02HV33Z INSERTION OF INFUSION DEVICE INTO SUPERIOR VENA CAVA, PERCUTANEOUS APPROACH: ICD-10-PCS | Performed by: ANESTHESIOLOGY

## 2019-01-18 PROCEDURE — 85610 PROTHROMBIN TIME: CPT

## 2019-01-18 PROCEDURE — 74011250636 HC RX REV CODE- 250/636: Performed by: EMERGENCY MEDICINE

## 2019-01-18 PROCEDURE — 74011250636 HC RX REV CODE- 250/636: Performed by: INTERNAL MEDICINE

## 2019-01-18 PROCEDURE — 65660000000 HC RM CCU STEPDOWN

## 2019-01-18 PROCEDURE — 85025 COMPLETE CBC W/AUTO DIFF WBC: CPT

## 2019-01-18 PROCEDURE — 74011250637 HC RX REV CODE- 250/637: Performed by: INTERNAL MEDICINE

## 2019-01-18 PROCEDURE — 80048 BASIC METABOLIC PNL TOTAL CA: CPT

## 2019-01-18 PROCEDURE — 74011000258 HC RX REV CODE- 258: Performed by: EMERGENCY MEDICINE

## 2019-01-18 PROCEDURE — 74011250636 HC RX REV CODE- 250/636

## 2019-01-18 PROCEDURE — 36415 COLL VENOUS BLD VENIPUNCTURE: CPT

## 2019-01-18 RX ORDER — MIDAZOLAM HYDROCHLORIDE 1 MG/ML
1 INJECTION, SOLUTION INTRAMUSCULAR; INTRAVENOUS AS NEEDED
Status: DISCONTINUED | OUTPATIENT
Start: 2019-01-18 | End: 2019-01-18

## 2019-01-18 RX ORDER — FENTANYL CITRATE 50 UG/ML
50 INJECTION, SOLUTION INTRAMUSCULAR; INTRAVENOUS AS NEEDED
Status: DISCONTINUED | OUTPATIENT
Start: 2019-01-18 | End: 2019-01-18 | Stop reason: HOSPADM

## 2019-01-18 RX ADMIN — METOPROLOL SUCCINATE 12.5 MG: 25 TABLET, EXTENDED RELEASE ORAL at 09:16

## 2019-01-18 RX ADMIN — LACOSAMIDE 50 MG: 100 TABLET, FILM COATED ORAL at 19:09

## 2019-01-18 RX ADMIN — MIDAZOLAM HYDROCHLORIDE 1 MG: 1 INJECTION, SOLUTION INTRAMUSCULAR; INTRAVENOUS at 14:35

## 2019-01-18 RX ADMIN — FENTANYL CITRATE 50 MCG: 50 INJECTION, SOLUTION INTRAMUSCULAR; INTRAVENOUS at 14:32

## 2019-01-18 RX ADMIN — SEVELAMER CARBONATE 1600 MG: 800 TABLET, FILM COATED ORAL at 12:25

## 2019-01-18 RX ADMIN — LEVETIRACETAM 750 MG: 500 TABLET, FILM COATED ORAL at 19:09

## 2019-01-18 RX ADMIN — CALCITRIOL 0.5 MCG: 0.25 CAPSULE ORAL at 09:14

## 2019-01-18 RX ADMIN — MORPHINE SULFATE 2 MG: 4 INJECTION INTRAVENOUS at 05:20

## 2019-01-18 RX ADMIN — Medication 10 ML: at 05:21

## 2019-01-18 RX ADMIN — ATORVASTATIN CALCIUM 20 MG: 10 TABLET, FILM COATED ORAL at 09:17

## 2019-01-18 RX ADMIN — PIPERACILLIN SODIUM,TAZOBACTAM SODIUM 3.38 G: 3; .375 INJECTION, POWDER, FOR SOLUTION INTRAVENOUS at 16:10

## 2019-01-18 RX ADMIN — MORPHINE SULFATE 2 MG: 4 INJECTION INTRAVENOUS at 16:10

## 2019-01-18 RX ADMIN — LACOSAMIDE 50 MG: 100 TABLET, FILM COATED ORAL at 09:15

## 2019-01-18 RX ADMIN — Medication 20 ML: at 16:11

## 2019-01-18 RX ADMIN — LEVETIRACETAM 750 MG: 500 TABLET, FILM COATED ORAL at 09:16

## 2019-01-18 RX ADMIN — SEVELAMER CARBONATE 1600 MG: 800 TABLET, FILM COATED ORAL at 16:10

## 2019-01-18 RX ADMIN — SEVELAMER CARBONATE 1600 MG: 800 TABLET, FILM COATED ORAL at 09:15

## 2019-01-18 RX ADMIN — PIPERACILLIN SODIUM,TAZOBACTAM SODIUM 3.38 G: 3; .375 INJECTION, POWDER, FOR SOLUTION INTRAVENOUS at 05:21

## 2019-01-18 RX ADMIN — MORPHINE SULFATE 2 MG: 4 INJECTION INTRAVENOUS at 20:29

## 2019-01-18 RX ADMIN — PANTOPRAZOLE SODIUM 40 MG: 40 TABLET, DELAYED RELEASE ORAL at 09:16

## 2019-01-18 RX ADMIN — ASPIRIN 81 MG 81 MG: 81 TABLET ORAL at 09:16

## 2019-01-18 RX ADMIN — Medication 10 ML: at 22:03

## 2019-01-18 NOTE — PROGRESS NOTES
Oncology Nursing Communication Tool 7:32 AM 
1/18/2019 Bedside shift change report given to Alvin Koroma RN (incoming nurse) by Tammy Unger (outgoing nurse) on Dai May. Report included the following information SBAR, Kardex, Intake/Output, MAR and Recent Results. Shift Summary: IV infiltrated, supervisor placed another IV, 24 in L wrist. Iv abx given. 2 of 3 labs drawn, could not obtain enough blood for 3rd tube. Pt is very hard stick with limited veins. Issues for physician to address: none Oncology Shift Note Admission Date 1/16/2019 Admission Diagnosis Sepsis ([facility] Pneumonia Code Status Full Code Consults IP CONSULT TO HOSPITALIST 
IP CONSULT TO NEPHROLOGY Cardiac Monitoring [x] Yes [] No  
  
Purposeful Hourly Rounding [x] Yes   
Easton Score Total Score: 1 Easton score 3 or > [] Bed Alarm [] Avasys [] 1:1 sitter [] Patient refused (Place signed refusal form in chart) Pain Managed [x] Yes [] No  
 Key Pain Meds The patient is on no pain meds. Influenza Vaccine Received Flu Vaccine for Current Season (usually Sept-March): Yes Oxygen needs? [x] Room air Oxygen @  []1L    []2L    []3L   []4L    []5L   []6L Use home O2? [] Yes [] No 
Perform O2 challenge test using  smartphrase (.oxygenchallenge) Last bowel movement   
bowel movement Urinary Catheter LDAs Peripheral IV 01/17/19 Left;Posterior Wrist (Active) Site Assessment Clean, dry, & intact 1/18/2019  3:12 AM  
Phlebitis Assessment 0 1/18/2019  3:12 AM  
Infiltration Assessment 0 1/18/2019  3:12 AM  
Dressing Status Clean, dry, & intact 1/18/2019  3:12 AM  
Dressing Type Tape;Transparent 1/18/2019  3:12 AM  
Hub Color/Line Status Yellow; Flushed;Patent 1/18/2019  3:12 AM  
                  
  
Readmission Risk Assessment Tool Score Medium Risk 25 Total Score 4 IP Visits Last 12 Months (1-3=4, 4=9, >4=11) 9 Pt. Coverage (Medicare=5 , Medicaid, or Self-Pay=4) 5 Charlson Comorbidity Score (Age + Comorbid Conditions) Criteria that do not apply:  
 Has Seen PCP in Last 6 Months (Yes=3, No=0) . Living with Significant Other. Assisted Living. LTAC. SNF. or  
Rehab Patient Length of Stay (>5 days = 3) Expected Length of Stay 4d 19h Actual Length of Stay 2 Kathy Ply

## 2019-01-18 NOTE — PROGRESS NOTES
Oncology Nursing Communication Tool 7:40 PM 
1/17/2019 Bedside shift change report given to Radha Worley RN (incoming nurse) by Ricardo Miles RN (outgoing nurse) on Chantale Cobb. Report included the following information SBAR. Shift Summary:  
  
 Issues for physician to address: Oncology Shift Note Admission Date 1/16/2019 Admission Diagnosis Sepsis ([address] Pneumonia Code Status Full Code Consults IP CONSULT TO HOSPITALIST 
IP CONSULT TO NEPHROLOGY Cardiac Monitoring [] Yes [] No  
  
Purposeful Hourly Rounding [] Yes   
Easton Score Total Score: 2 Easton score 3 or > [] Bed Alarm [] Avasys [] 1:1 sitter [] Patient refused (Place signed refusal form in chart) Pain Managed [] Yes [] No  
 Key Pain Meds The patient is on no pain meds. Influenza Vaccine Received Flu Vaccine for Current Season (usually Sept-March): Yes Oxygen needs? [] Room air Oxygen @  []1L    []2L    []3L   []4L    []5L   []6L Use home O2? [] Yes [] No 
Perform O2 challenge test using  smartphrase (.oxygenchallenge) Last bowel movement   
bowel movement Urinary Catheter LDAs Peripheral IV 01/17/19 Left Forearm (Active) Site Assessment Clean, dry, & intact 1/17/2019  2:19 PM  
Phlebitis Assessment 0 1/17/2019  2:19 PM  
Infiltration Assessment 0 1/17/2019  2:19 PM  
Dressing Status Clean, dry, & intact 1/17/2019  2:19 PM  
Dressing Type Transparent 1/17/2019  2:19 PM  
Hub Color/Line Status Blue;Capped 1/17/2019  2:19 PM  
Action Taken Other (comment) 1/17/2019  2:19 PM  
                  
  
Readmission Risk Assessment Tool Score Medium Risk 25 Total Score 4 IP Visits Last 12 Months (1-3=4, 4=9, >4=11) 9 Pt. Coverage (Medicare=5 , Medicaid, or Self-Pay=4) 5 Charlson Comorbidity Score (Age + Comorbid Conditions) Criteria that do not apply:  
 Has Seen PCP in Last 6 Months (Yes=3, No=0) . Living with Significant Other. Assisted Living. LTAC. SNF. or  
Rehab Patient Length of Stay (>5 days = 3) Expected Length of Stay 4d 19h Actual Length of Stay 1 Te Cobb RN

## 2019-01-18 NOTE — PROCEDURES
Central Line Placement    Start time: 1/18/2019 2:02 PM  End time: 1/18/2019 3:04 PM  Performed by: Aliya Smith MD  Authorized by: Aliya Smith MD     Indications: vascular access  Preanesthetic Checklist: patient identified, risks and benefits discussed, anesthesia consent, site marked, patient being monitored and timeout performed      Pre-procedure: All elements of maximal sterile barrier technique followed? Yes    2% Chlorhexidine for cutaneous antisepsis, Hand hygiene performed prior to catheter insertion and Ultrasound guidance    Sterile Ultrasound Technique followed?: Yes        Ultrasound Image Stored? Image stored    Procedure:   Prep:  ChloraPrep  Location:  Femoral  Orientation:  Right  Patient position:  Flat  Catheter type:  Triple lumen  Catheter size:  7 Fr  Catheter length:  16 cm  Number of attempts:  1  Successful placement: Yes      Assessment:   Post-procedure:  Catheter secured and sterile dressing applied  Assessment:  Blood return through all ports  Insertion:  Uncomplicated  Patient tolerance:  Patient tolerated the procedure well with no immediate complications  R IJ and R SC attempted w/o success. Probable clot since wire would not advance. CXR pending.     Care turned over to covering Attending MD.

## 2019-01-18 NOTE — PROGRESS NOTES
1520-TRANSFER - OUT REPORT: 
 
Verbal report given to nurse f/1140  Sergio Yee  being transferred  routine post - op Report consisted of patients Situation, Background, Assessment and  
Recommendations(SBAR). Information from the following report(s) SBAR, MAR and Recent Results was reviewed with the receiving nurse. Lines:  
Triple Lumen 01/18/19 Right Femoral (Active) Peripheral IV 01/17/19 Left;Posterior Wrist (Active) Site Assessment Clean, dry, & intact 1/18/2019  1:55 PM  
Phlebitis Assessment 0 1/18/2019  1:55 PM  
Infiltration Assessment 0 1/18/2019  1:55 PM  
Dressing Status Clean, dry, & intact 1/18/2019  1:55 PM  
Dressing Type Transparent;Tape 1/18/2019  1:55 PM  
Hub Color/Line Status Yellow; Flushed; Infusing 1/18/2019  1:55 PM  
  
 
Opportunity for questions and clarification was provided.

## 2019-01-18 NOTE — PROGRESS NOTES
Reason for Admission:   [place] aching left sided abdominal pain radiating to back since Monday (19). SOB, fever, nausea, vomiting, rhinorrhea, productive cough and wheezing since onset of pain. RRAT Score: Do you (patient/family) have any concerns for transition/discharge? Assessment completed with Marisa Mcdonald. Voices no concerns Plan for utilizing home health: To be determined Likelihood of readmission? Mod to high Transition of Care Plan:      Follow up appointments.  if applicable. Patient is presently off unit for procedure. CM contacted Emergency Contact Marisa Mcdonald and reviewed demographics. Cm introduced self and explained role with health care team. 
Pt name and  confirmed as pt identifiers. Demographics confirmed. Patient lives with his sister Marisa Mcdonald in a duplex. Steps for entry. ADL's/IADL's - patient was fully independent prior to admission to include steps and driving DME - Hemodialysis patient at home. 5 days a week. Nayana Mckeon [facility] S Nephrologist is Juan Francisco Mosley MD 
Patient is [facility] to [facility] on [address].  [phone]; [phone] Sister,  Marisa Mcdonald is personal caregiver. Goal is to discharge home. Care Management Interventions PCP Verified by CM: Yes Mode of Transport at Discharge: Self(patient drove self to the hospital ) Transition of Care Consult (CM Consult): Discharge Planning Discharge Durable Medical Equipment: No 
Physical Therapy Consult: No 
Occupational Therapy Consult: No 
Current Support Network: Lives with Caregiver(lives with sister) Confirm Follow Up Transport: Self Plan discussed with Pt/Family/Caregiver: Yes Discharge Location Discharge Placement: Home Roxana Vera RN  Ext [phone]

## 2019-01-18 NOTE — PROGRESS NOTES
1425-After several attempts, Dr. Marcelle Kirkland will re-attempt a groin placement. 1430-Orders received to medicate pt.

## 2019-01-18 NOTE — PERIOP NOTES
TRANSFER - IN REPORT: 
 
Verbal report received from [facility] RN(name) on Lev Greenberg  being received from Oncology Room 1140(unit) for ordered procedure Report consisted of patients Situation, Background, Assessment and  
Recommendations(SBAR). Information from the following report(s) SBAR, Kardex, Intake/Output, MAR, Recent Results and Procedure Verification was reviewed with the receiving nurse. Opportunity for questions and clarification was provided. Assessment completed upon patients arrival to unit and care assumed.

## 2019-01-18 NOTE — PROGRESS NOTES
Hospitalist Progress Note NAME: Sammie Bryant :  1975 MRN:  [id number] Assessment / Plan: 
Sepsis with fever, tachypnea and tachycardia POA in ESRD HD patient 
pleuritic Pain (abdominal) Due to Right Lower Lobe Pneumonia as per CT (Pt does have productive cough and shortness of breath) Fevers getting better Continue IV vancomycin, Zosyn and Levaquin F/u blood cultures Check sputum cultures if able to 
CT A/P with Mild airspace disease in the right lower lobe could represent early pneumonia. No evidence of acute abdominal or pelvic process. Stable incidental findings as detailed above Continue norco 
 
ESRD on HD (home HD 5 days on weekdays) HD per nephrology while here CAD Continue ASA Tn Negative AICD in place HTN Chronic systolic heart Failure with EF 10% in , 40 % in 2018 Continue metoprolol Not on ACE/ARBs, likely due to BP issues, ? Renal issues, already on HD Fluid Mx with HD per nephrology H/o Seizure DO: continue keppra, Vimpat H/o DVT Lower Extremity: on Coumadin, pharmacy dosing Hypokalemia 
repleted upon admission Monitor and replete PRN Body mass index is 22.13 kg/m². consistent with normal weight Code status: Full Prophylaxis: Coumadin Recommended Disposition: Home w/Family Subjective: Pt seen and examined at bedside. Continue to feel shortness of breath and having fevers. Overnight events d/w RN Chief Complaint / Reason for Physician Visit; f/u \"Sepsis/PNA\" Review of Systems: 
Symptom Y/N Comments  Symptom Y/N Comments Fever/Chills y   Chest Pain Poor Appetite    Edema Cough y improving  Abdominal Pain y pleuritic Sputum n resolved  Joint Pain SOB/ZAMUDIO y   Pruritis/Rash Nausea/vomit    Tolerating PT/OT Diarrhea    Tolerating Diet y Constipation    Other Could NOT obtain due to:   
 
Objective: VITALS:  
Last 24hrs VS reviewed since prior progress note. Most recent are: Patient Vitals for the past 24 hrs: 
 Temp Pulse Resp BP SpO2  
01/18/19 1552 97.9 °F (36.6 °C) 83 16 104/65 100 % 01/18/19 1447  80 18 121/89 99 % 01/18/19 1440  84 20 105/75 100 % 01/18/19 1430  89 20 111/77 100 % 01/18/19 1400  79 14 102/66 95 % 01/18/19 1332 98 °F (36.7 °C) 81 18 107/70 93 % 01/18/19 1134 98.4 °F (36.9 °C) 88 18 102/59 98 % 01/18/19 0757 98.3 °F (36.8 °C) 85 18 101/63 100 % 01/18/19 0326 97.8 °F (36.6 °C) 97 18 92/54 100 % 01/17/19 2300 99.4 °F (37.4 °C) (!) 108 18 97/61 93 % 01/17/19 1925 99.5 °F (37.5 °C) (!) 107 18 114/71 95 % 01/17/19 1812 99.1 °F (37.3 °C) 90 18 143/72   
01/17/19 1803  86 16 136/72   
01/17/19 1748  82 16 142/70   
01/17/19 1733  88 16 137/80   
01/17/19 1718  87 16 107/86   
01/17/19 1703  84 16 110/81   
01/17/19 1648  86 16 118/84  Intake/Output Summary (Last 24 hours) at 1/18/2019 1636 Last data filed at 1/18/2019 4945 Gross per 24 hour Intake 200 ml Output 1500 ml Net -1300 ml PHYSICAL EXAM: 
General: WD, WN. Alert, cooperative, no acute distress   
EENT:  EOMI. Anicteric sclerae. MMM Resp:  Minimal crackles at R base, no wheezing or rales. No accessory muscle use CV:  Regular  rhythm,  No edema GI:  Soft, Non distended, Non tender.  +Bowel sounds Neurologic:  Alert and oriented X 3, normal speech, Psych:   Good insight. Not anxious nor agitated Skin:  No rashes. No jaundice Reviewed most current lab test results and cultures  YES Reviewed most current radiology test results   YES Review and summation of old records today    NO Reviewed patient's current orders and MAR    YES 
PMH/ reviewed - no change compared to H&P 
________________________________________________________________________ Care Plan discussed with: 
  Comments Patient y Family RN y   
Care Manager Consultant                   Multidiciplinary team rounds were held today with case manager, nursing, pharmacist and clinical coordinator. Patient's plan of care was discussed; medications were reviewed and discharge planning was addressed. ________________________________________________________________________ Total NON critical care TIME:  25 Minutes Total CRITICAL CARE TIME Spent:   Minutes non procedure based Comments >50% of visit spent in counseling and coordination of care    
________________________________________________________________________ Jason Echols MD  
 
Procedures: see electronic medical records for all procedures/Xrays and details which were not copied into this note but were reviewed prior to creation of Plan. LABS: 
I reviewed today's most current labs and imaging studies. Pertinent labs include: 
Recent Labs  
  01/18/19 
0540 01/17/19 
1420 01/16/19 
0709 WBC 5.0 5.9 6.5 HGB 11.4* 10.8* 13.4 HCT 34.2* 31.7* 40.3 * 142* 184 Recent Labs  
  01/18/19 
0540 01/17/19 
1420 01/16/19 
1702 01/16/19 
9440 * 128*  --  130*  
K 4.3 4.0  --  3.2*  
CL 98 93*  --  90* CO2 24 23  --  26  
GLU 87 97  --  96 BUN 27* 63*  --  39* CREA 8.02* 14.10*  --  11.00* CA 9.1 8.8  --  9.6 ALB  --   --   --  4.4 TBILI  --   --   --  0.5 SGOT  --   --   --  24 ALT  --   --   --  19 INR  --  2.3* 1.7*  --   
 
 
Signed: Jason Echols MD

## 2019-01-18 NOTE — PROGRESS NOTES
Pharmacy Automatic Renal Dosing Protocol - Antimicrobials Indication for Antimicrobials: RLL-HAP, Sepsis Current Regimen of Each Antimicrobial: 
Vancomycin 1250 mg IV x 1 dose , then 750mg IV after each HD (Start Date 19 Day # 3) Zosyn 3.375 gm IV every 12hrs (Start Date 19 Day # 3) Levaquin 750mg IV x1, 500 mg IV every 48hrs   (Start Date 19 Day # 3) Previous Antimicrobial Therapy: 
 
Goal Level: VANCOMYCIN TROUGH GOAL RANGE Vancomycin Trough: 15 - 20 mcg/mL Date Dose & Interval Measured (mcg/mL) Extrapolated (mcg/mL) Date & time of next level:  
random level before the second dose of 750 mg IV, first maintenance dose due today after HD Significant Cultures:  
19 Blood = NG x 2 day = pending 19 Sputum = ordered - not yet done Radiology / Imaging results: (X-ray, CT scan or MRI):  
 
Paralysis, amputations, malnutrition: NA 
 
Labs: 
Recent Labs  
  19 
0709 CREA 11.00* BUN 39* WBC 6.5 Recent Labs  
  19 
0540 CREA 8.02* BUN 27* WBC 5.0 Temp (24hrs), Av.6 °F (37.6 °C), Min:98 °F (36.7 °C), Max:103 °F (39.4 °C) Creatinine Clearance (mL/min) or Dialysis:   ESRD-HD, Home HD 5 days a week Impression/Plan: · Will continue with Vancomycin 750 mg IV at the end of each HD. ([facility] HD /) · Will continue Levaquin 500mg IV every 48hrs as appropriate for renal function and indication · Continue Zosyn 3.375 gm IV every 12 hrs as appropriate for HD dosing ·  random level Sat am the before the second dose of HD  750 mg · Antimicrobial stop date: To be determined Pharmacy will follow daily and adjust medications as appropriate for renal function and/or serum levels.  
 
Thank you, 
Noemy Quigley, [facility]

## 2019-01-19 LAB
ANION GAP SERPL CALC-SCNC: 11 MMOL/L (ref 5–15)
BASOPHILS # BLD: 0 K/UL (ref 0–0.1)
BASOPHILS NFR BLD: 0 % (ref 0–1)
BUN SERPL-MCNC: 34 MG/DL (ref 6–20)
BUN/CREAT SERPL: 3 (ref 12–20)
CALCIUM SERPL-MCNC: 9 MG/DL (ref 8.5–10.1)
CHLORIDE SERPL-SCNC: 97 MMOL/L (ref 97–108)
CO2 SERPL-SCNC: 25 MMOL/L (ref 21–32)
CREAT SERPL-MCNC: 10.5 MG/DL (ref 0.7–1.3)
DIFFERENTIAL METHOD BLD: ABNORMAL
EOSINOPHIL # BLD: 1.8 K/UL (ref 0–0.4)
EOSINOPHIL NFR BLD: 24 % (ref 0–7)
ERYTHROCYTE [DISTWIDTH] IN BLOOD BY AUTOMATED COUNT: 15.6 % (ref 11.5–14.5)
GLUCOSE SERPL-MCNC: 87 MG/DL (ref 65–100)
HCT VFR BLD AUTO: 32.7 % (ref 36.6–50.3)
HGB BLD-MCNC: 10.8 G/DL (ref 12.1–17)
IMM GRANULOCYTES # BLD AUTO: 0 K/UL (ref 0–0.04)
IMM GRANULOCYTES NFR BLD AUTO: 0 % (ref 0–0.5)
INR PPP: 3.9 (ref 0.9–1.1)
LYMPHOCYTES # BLD: 1 K/UL (ref 0.8–3.5)
LYMPHOCYTES NFR BLD: 13 % (ref 12–49)
MCH RBC QN AUTO: 31.5 PG (ref 26–34)
MCHC RBC AUTO-ENTMCNC: 33 G/DL (ref 30–36.5)
MCV RBC AUTO: 95.3 FL (ref 80–99)
MONOCYTES # BLD: 0.7 K/UL (ref 0–1)
MONOCYTES NFR BLD: 9 % (ref 5–13)
NEUTS SEG # BLD: 4 K/UL (ref 1.8–8)
NEUTS SEG NFR BLD: 54 % (ref 32–75)
NRBC # BLD: 0 K/UL (ref 0–0.01)
NRBC BLD-RTO: 0 PER 100 WBC
PLATELET # BLD AUTO: 148 K/UL (ref 150–400)
PMV BLD AUTO: 10.5 FL (ref 8.9–12.9)
POTASSIUM SERPL-SCNC: 4.1 MMOL/L (ref 3.5–5.1)
PROTHROMBIN TIME: 36.7 SEC (ref 9–11.1)
RBC # BLD AUTO: 3.43 M/UL (ref 4.1–5.7)
RBC MORPH BLD: ABNORMAL
SODIUM SERPL-SCNC: 133 MMOL/L (ref 136–145)
VANCOMYCIN SERPL-MCNC: 19.1 UG/ML
WBC # BLD AUTO: 7.5 K/UL (ref 4.1–11.1)

## 2019-01-19 PROCEDURE — 94760 N-INVAS EAR/PLS OXIMETRY 1: CPT

## 2019-01-19 PROCEDURE — 36415 COLL VENOUS BLD VENIPUNCTURE: CPT

## 2019-01-19 PROCEDURE — 74011250636 HC RX REV CODE- 250/636: Performed by: INTERNAL MEDICINE

## 2019-01-19 PROCEDURE — 94761 N-INVAS EAR/PLS OXIMETRY MLT: CPT

## 2019-01-19 PROCEDURE — 85610 PROTHROMBIN TIME: CPT

## 2019-01-19 PROCEDURE — 80202 ASSAY OF VANCOMYCIN: CPT

## 2019-01-19 PROCEDURE — 90935 HEMODIALYSIS ONE EVALUATION: CPT

## 2019-01-19 PROCEDURE — 74011000250 HC RX REV CODE- 250: Performed by: INTERNAL MEDICINE

## 2019-01-19 PROCEDURE — 74011250636 HC RX REV CODE- 250/636: Performed by: EMERGENCY MEDICINE

## 2019-01-19 PROCEDURE — 74011250637 HC RX REV CODE- 250/637: Performed by: INTERNAL MEDICINE

## 2019-01-19 PROCEDURE — 74011000258 HC RX REV CODE- 258: Performed by: EMERGENCY MEDICINE

## 2019-01-19 PROCEDURE — 94640 AIRWAY INHALATION TREATMENT: CPT

## 2019-01-19 PROCEDURE — 85025 COMPLETE CBC W/AUTO DIFF WBC: CPT

## 2019-01-19 PROCEDURE — 65660000000 HC RM CCU STEPDOWN

## 2019-01-19 PROCEDURE — 80048 BASIC METABOLIC PNL TOTAL CA: CPT

## 2019-01-19 RX ORDER — IPRATROPIUM BROMIDE AND ALBUTEROL SULFATE 2.5; .5 MG/3ML; MG/3ML
3 SOLUTION RESPIRATORY (INHALATION)
Status: DISCONTINUED | OUTPATIENT
Start: 2019-01-19 | End: 2019-01-20 | Stop reason: HOSPADM

## 2019-01-19 RX ORDER — GUAIFENESIN 600 MG/1
600 TABLET, EXTENDED RELEASE ORAL EVERY 12 HOURS
Status: DISCONTINUED | OUTPATIENT
Start: 2019-01-19 | End: 2019-01-20 | Stop reason: HOSPADM

## 2019-01-19 RX ADMIN — Medication 10 ML: at 05:27

## 2019-01-19 RX ADMIN — GUAIFENESIN 600 MG: 600 TABLET, EXTENDED RELEASE ORAL at 13:55

## 2019-01-19 RX ADMIN — PIPERACILLIN SODIUM,TAZOBACTAM SODIUM 3.38 G: 3; .375 INJECTION, POWDER, FOR SOLUTION INTRAVENOUS at 05:19

## 2019-01-19 RX ADMIN — VANCOMYCIN HYDROCHLORIDE 750 MG: 750 INJECTION, POWDER, LYOPHILIZED, FOR SOLUTION INTRAVENOUS at 10:12

## 2019-01-19 RX ADMIN — Medication 10 ML: at 17:20

## 2019-01-19 RX ADMIN — LEVETIRACETAM 750 MG: 500 TABLET, FILM COATED ORAL at 17:58

## 2019-01-19 RX ADMIN — Medication 10 ML: at 21:34

## 2019-01-19 RX ADMIN — SEVELAMER CARBONATE 1600 MG: 800 TABLET, FILM COATED ORAL at 14:31

## 2019-01-19 RX ADMIN — MORPHINE SULFATE 2 MG: 4 INJECTION INTRAVENOUS at 02:16

## 2019-01-19 RX ADMIN — MORPHINE SULFATE 2 MG: 4 INJECTION INTRAVENOUS at 12:32

## 2019-01-19 RX ADMIN — LACOSAMIDE 50 MG: 100 TABLET, FILM COATED ORAL at 17:58

## 2019-01-19 RX ADMIN — HYDROCODONE BITARTRATE AND ACETAMINOPHEN 1 TABLET: 5; 325 TABLET ORAL at 18:16

## 2019-01-19 RX ADMIN — IPRATROPIUM BROMIDE AND ALBUTEROL SULFATE 3 ML: .5; 3 SOLUTION RESPIRATORY (INHALATION) at 19:30

## 2019-01-19 RX ADMIN — PIPERACILLIN SODIUM,TAZOBACTAM SODIUM 3.38 G: 3; .375 INJECTION, POWDER, FOR SOLUTION INTRAVENOUS at 17:20

## 2019-01-19 RX ADMIN — MORPHINE SULFATE 2 MG: 4 INJECTION INTRAVENOUS at 20:42

## 2019-01-19 RX ADMIN — ERYTHROPOIETIN 10000 UNITS: 10000 INJECTION, SOLUTION INTRAVENOUS; SUBCUTANEOUS at 21:34

## 2019-01-19 RX ADMIN — SEVELAMER CARBONATE 1600 MG: 800 TABLET, FILM COATED ORAL at 17:19

## 2019-01-19 RX ADMIN — IPRATROPIUM BROMIDE AND ALBUTEROL SULFATE 3 ML: .5; 3 SOLUTION RESPIRATORY (INHALATION) at 14:39

## 2019-01-19 RX ADMIN — GUAIFENESIN 600 MG: 600 TABLET, EXTENDED RELEASE ORAL at 21:34

## 2019-01-19 NOTE — PROGRESS NOTES
Noted right femoral central line dressing has gauze with regular tape on. Catheter site is clean, dry and intact. New dressing applied per protocol; Minor [place] End caps were changed.

## 2019-01-19 NOTE — PROGRESS NOTES
Pt states that during antibiotic infusion pt feels aches and chills. MD paged, awaiting return call. [address] to Dr Lazara Lopez on phone. Orders to stop Zosyn infusion.

## 2019-01-19 NOTE — PROGRESS NOTES
Pharmacy Automatic Renal Dosing Protocol - Antimicrobials Indication for Antimicrobials: RLL-HAP, Sepsis Current Regimen of Each Antimicrobial: 
Vancomycin 1250 mg IV x 1 dose , then 750mg IV after each HD (Start Date 19 Day # 3) Zosyn 3.375 gm IV every 12hrs (Start Date 19 Day # 3) Levaquin 750mg IV x1, 500 mg IV every 48hrs   (Start Date 19 Day # 3) Previous Antimicrobial Therapy: 
 
Goal Level: VANCOMYCIN TROUGH GOAL RANGE Vancomycin Trough: 15 - 20 mcg/mL Date Dose & Interval Measured (mcg/mL) Extrapolated (mcg/mL)  0501 750 mg with HD 19.1 HD Date & time of next level:  
random level before the second dose of 750 mg IV, first maintenance dose due today after HD Significant Cultures:  
19 Blood = NG  = pending Radiology / Imaging results: (X-ray, CT scan or MRI):  
 
Paralysis, amputations, malnutrition: NA 
 
Labs: 
Recent Labs  
  19 
0501 CREA 10.50* BUN 34* WBC 7.5 Temp (24hrs), Av.6 °F (37.6 °C), Min:98 °F (36.7 °C), Max:103 °F (39.4 °C) Creatinine Clearance (mL/min) or Dialysis:   ESRD-HD, Home HD 5 days a week Impression/Plan: · Will continue with Vancomycin 750 mg IV at the end of each HD. ([facility] HD //) · Vancomycin trough is within the goal range · Will continue Levaquin 500mg IV every 48hrs as appropriate for renal function and indication · Continue Zosyn 3.375 gm IV every 12 hrs as appropriate for HD dosing · Antimicrobial stop date: To be determined Pharmacy will follow daily and adjust medications as appropriate for renal function and/or serum levels. Thank you, Rodolfo Borden, [facility] 
 
 
Recommended duration of therapy 
http://[email or web address]/[place]/[facility]/Pharmacy/Clinical%20Companion/Duration%20of%20ABX%20therapy. docx Renal Dosing 
http://[facility]/[place]/[facility]/Pharmacy/Clinical%20Companion/Renal%[id number]. pdf

## 2019-01-19 NOTE — PROGRESS NOTES
Problem: Falls - Risk of 
Goal: *Absence of Falls Document Klaus Wagner Fall Risk and appropriate interventions in the flowsheet. Outcome: Progressing Towards Goal 
Fall Risk Interventions: 
Mobility Interventions: Assess mobility with egress test 
 
  
 
Medication Interventions: Teach patient to arise slowly Elimination Interventions: Call light in reach

## 2019-01-19 NOTE — PROGRESS NOTES
Nephrology Progress Note Blaise Leblanc  
 
[facility]. [facility]                  Phone - [phone] Patient: Reji West YOB: 1975     Admit Date: 1/16/2019 Date- 1/19/2019 CC: Follow up for esrd Subjective: Interval History: -  S/p hd this am 
bp stable Na improved No c/o sob, No c/o chest pain, No c/o nausea or vomiting No c/o  fever. Blood cx negative ROS:- as above Assessment:  
· ESRD- home hd, MCV pt · Anemia of CKD · Secondary hyperparathyroidism of renal origin · Sepsis · Hyponatremia · hypokalemia · H/o hypertension · Plan: · S/p hd today · epogen today · Hd TTS schedule · kdur 40 meq po · Continue calcitriol · Continue renvela Physical Exam:  
GEN: NAD NECK- Supple, no thyromegaly RESP:clear b/l, no wheezing, No accessory muscle use CVS: RRR,S1,S2 ABDO:soft, non tender, No hepatosplenomegaly EXT- right arm av graft + NEURO: non focal, normal speech Care Plan discussed with: pt and nurse Objective:  
Patient Vitals for the past 24 hrs: 
 Temp Pulse Resp BP SpO2  
01/19/19 1220 99.9 °F (37.7 °C) (!) 108 16 117/63 96 % 01/19/19 1119 99.4 °F (37.4 °C) 94 18 101/62   
01/19/19 1115  93 18 102/57   
01/19/19 1100  98 18 102/58   
01/19/19 1045  98 18 90/57   
01/19/19 1030  100 18 91/54   
01/19/19 1015  95 18 (!) 84/49   
01/19/19 1000  95 18 (!) 77/52   
01/19/19 0945  94 18 (!) 86/56   
01/19/19 0930  100 18 (!) 85/55   
01/19/19 0915  97 18 91/57   
01/19/19 0900  94 18 (!) 75/41   
01/19/19 0845  86 18 (!) 88/50   
01/19/19 0833  87 18 95/59   
01/19/19 0818  85 18 97/60   
01/19/19 0800 98.4 °F (36.9 °C) 83 18 104/63   
01/19/19 0700 98.4 °F (36.9 °C) 93 18 100/59 100 % 01/19/19 0305 98.2 °F (36.8 °C) 96 18 93/64 96 % 01/18/19 2340 98.1 °F (36.7 °C) 100 18 96/64 100 % 01/18/19 1942 98 °F (36.7 °C) 94 18 95/62 100 % 01/18/19 1552 97.9 °F (36.6 °C) 83 16 104/65 100 % 01/18/19 1447  80 18 121/89 99 % 01/18/19 1440  84 20 105/75 100 % 01/18/19 1430  89 20 111/77 100 % 01/18/19 1400  79 14 102/66 95 % Last 3 Recorded Weights in this Encounter 01/16/19 0636 01/17/19 0338 01/18/19 1146 Weight: 65.7 kg (144 lb 13.5 oz) 65.9 kg (145 lb 4.5 oz) 64.1 kg (141 lb 5 oz) 01/17 1901 - 01/19 0700 In: 300 [I.V.:300] Out: - Chart reviewed. Pertinent Notes reviewed. Medication list  reviewed Current Facility-Administered Medications Medication  albuterol-ipratropium (DUO-NEB) 2.5 MG-0.5 MG/3 ML  
 guaiFENesin ER (MUCINEX) tablet 600 mg  levoFLOXacin (LEVAQUIN) 500 mg in D5W IVPB  vancomycin (VANCOCIN) 750 mg in 0.9% sodium chloride 250 mL (Ruvv2Lsb) And  
 VANCOMYCIN INFORMATION NOTE  aspirin chewable tablet 81 mg  
 atorvastatin (LIPITOR) tablet 20 mg  
 calcitRIOL (ROCALTROL) capsule 0.5 mcg  lacosamide (VIMPAT) tablet 50 mg  
 HYDROcodone-acetaminophen (NORCO) 5-325 mg per tablet 1 Tab  sevelamer carbonate (RENVELA) tab 1,600 mg  levETIRAcetam (KEPPRA) tablet 750 mg  
 metoprolol succinate (TOPROL-XL) XL tablet 12.5 mg  
 sodium chloride (NS) flush 5-40 mL  sodium chloride (NS) flush 5-40 mL  morphine injection 2 mg  piperacillin-tazobactam (ZOSYN) 3.375 g in 0.9% sodium chloride (MBP/ADV) 100 mL  pantoprazole (PROTONIX) tablet 40 mg  WARFARIN INFORMATION NOTE (COUMADIN) Data Review : 
Recent Labs  
  01/19/19 
0501 01/18/19 
1600 01/18/19 
0540 01/17/19 
1420 01/16/19 
1702 WBC 7.5  --  5.0 5.9  --   
HGB 10.8*  --  11.4* 10.8*  --   
*  --  148* 142*  --   
ANEU 4.0  --  1.9  --   --   
INR 3.9* 3.4*  --  2.3* 1.7* *  --  132* 128*  --   
K 4.1  --  4.3 4.0  --   
GLU 87  --  87 97  --   
BUN 34*  --  27* 63*  --   
CREA 10.50*  --  8.02* 14.10*  --   
CA 9.0  --  9.1 8.8  --   
 
Lab Results Component Value Date/Time Culture result: NO GROWTH 3 DAYS 01/16/2019 07:08 AM  
 Culture result: NO GROWTH 5 DAYS 11/19/2018 03:35 PM  
 Culture result: NO GROWTH 5 DAYS 11/16/2018 08:32 PM  
 
Lab Results Component Value Date/Time Specimen Description: NARES 05/30/2013 05:53 PM  
 Specimen Description: URINE 11/16/2012 05:15 PM  
 Specimen Description: URINE 09/17/2012 01:35 AM  
 
No results for input(s): FE, TIBC, PSAT, FERR in the last 72 hours. Lab Results Component Value Date/Time Sodium,urine random 115 07/14/2011 07:20 PM  
 Creatinine, urine 52.2 07/14/2011 07:20 PM  
 
 
 
 
Bassam Salazar MD 
[facility] 
 www. [facility] / [employer] [address] [place], [address] Phone - [phone] Fax - [phone]

## 2019-01-19 NOTE — PROGRESS NOTES
Called pharmacy and spoke to employee at [facility] for pt med epoetin emilie. Awaiting delivery from pharmacy to administer medication.

## 2019-01-19 NOTE — PROCEDURES
Winter Dialysis Team [facility]  [phone]    Vitals   Pre   Post   Assessment   Pre   Post     Temp  Temp: 98.4 °F (36.9 °C) (01/19/19 0800) 99.4 LOC  A&Ox4 A&Ox4   HR   Pulse (Heart Rate): 83 (01/19/19 0800) 94 Lungs   dimished diminished   B/P   BP: 104/63 (01/19/19 0800) 101/62 Cardiac   On tele On tele   Resp   Resp Rate: 18 (01/19/19 0800) 18 Skin   Warm, dry and intadt Warm, dry and intact   Pain level  7 6 Edema  None noted None noted   Orders:    Duration:   Start:    0800 End:    1119 Total:   3.25 hours   Dialyzer:   Dialyzer/Set Up Inspection: Ninfa Soriano (01/19/19 0800)   K Bath:   Dialysate K (mEq/L): 2 (01/19/19 0800)   Ca Bath:   Dialysate CA (mEq/L): 2.5 (01/19/19 0800)   Na/Bicarb:   Dialysate NA (mEq/L): 140 (01/19/19 0800)   Target Fluid Removal:   Goal/Amount of Fluid to Remove (mL): 2000 mL (01/19/19 0800)   Access     Type & Location:   RUE AVF + b/t. Access cleaned with alcohol and cannulated with 15gx2 w/o difficulty per P&P. Labs     Obtained/Reviewed   Critical Results Called   Date when labs were drawn-  Hgb-    HGB   Date Value Ref Range Status   01/19/2019 10.8 (L) 12.1 - 17.0 g/dL Final     K-    Potassium   Date Value Ref Range Status   01/19/2019 4.1 3.5 - 5.1 mmol/L Final     Ca-   Calcium   Date Value Ref Range Status   01/19/2019 9.0 8.5 - 10.1 MG/DL Final     Bun-   BUN   Date Value Ref Range Status   01/19/2019 34 (H) 6 - 20 MG/DL Final     Creat-   Creatinine   Date Value Ref Range Status   01/19/2019 10.50 (H) 0.70 - 1.30 MG/DL Final     Comment:     INVESTIGATED PER DELTA CHECK PROTOCOL        Medications/ Blood Products Given     Name   Dose   Route and Time     Vancomycin 750mg IV @ 1012             Blood Volume Processed (BVP):    81.8 Net Fluid   Removed:  200mL   Comments   Dialysis medications reviewed by RN. Assessment performed by RN. Procedure and documentation observed and reviewed by Berny Grimm RN.   Time Out Done: 3086  Primary Nurse Rpt Pre:  Ruben San RN  Primary Nurse Rpt Post:  Ritchie Robles (name), RN  Pt Education: procedural  Care Plan: on going  Tx Summary:  0800:  SBAR received from Primary RN. Pt arrived to HD suite A&Ox4. RUE AVF + b/t. Access cleaned with alcohol and cannulated with 15gx2 w/o difficulty per P&P. Needles secured with tape. VSS,  tx initiated. 0818:  Pt resting    0833:  Goal reduced to 1500 d/t bp; pt alert, denies complaints    0845:  UF off d/t BP dropping, cuff adjusted x3; pt verbally states he feels fine, just a little back and stomach pain  0900:  Pt alert, denies complaints; watching movie on telephone. UF remains off; RN aware of BP  0915:  Pt resting, UF remains off  0930:  Pt resting, denies complaints; UF remains off  0945:  Pt resting, UF remains off  1000:  Pt resting, UF remains off  1012:  Vancomycin started  1015:  Pt resting; UF remains off  1030:  Pt resting; UF remains off  1045:  Pt resting; UF remains off  1100:  Pt resting; UF on, goal reduced to 1000.  1115:  Pt resting  1119: Tx terminated. All possible blood returned to pt w/o difficulty. Needles pulled, manual pressure held until hemostasis achieved; dressing changed, held in place with tape. SBAR called to Primary RN. VSS, pt returned to room. Admiting Diagnosis:  Sepsis  Pt's previous clinic- [address]  Consent signed - Informed Consent Verified: Yes (01/19/19 0800)  Winter Consent - signed and on file  Hepatitis Status- neg 2/5/18 >1000  Machine #- Machine Number: [id number] (01/19/19 0800)  Telemetry status- NSR (Tachy)  Pre-dialysis wt. -  n/a

## 2019-01-19 NOTE — PROGRESS NOTES
Oncology Nursing Communication Tool 
7:22 AM 
1/19/2019 Bedside and Verbal shift change report given to Angie Jiang RN (incoming nurse) by Whit Plaza (outgoing nurse) on Merlinda Downing. Report included the following information SBAR, Kardex and MAR. Shift Summar; pt stable, labs drawn received prn pain meds. Oncology Shift Note Admission Date 1/16/2019 Admission Diagnosis Sepsis ([address] Pneumonia Code Status Full Code Consults IP CONSULT TO HOSPITALIST 
IP CONSULT TO NEPHROLOGY Cardiac Monitoring [] Yes [] No  
  
Purposeful Hourly Rounding [] Yes   
Easton Score Total Score: 1 Easton score 3 or > [] Bed Alarm [] Avasys [] 1:1 sitter [] Patient refused (Place signed refusal form in chart) Pain Managed [] Yes [] No  
 Key Pain Meds The patient is on no pain meds. Influenza Vaccine Received Flu Vaccine for Current Season (usually Sept-March): Yes Oxygen needs? [] Room air Oxygen @  []1L    []2L    []3L   []4L    []5L   []6L Use home O2? [] Yes [] No 
Perform O2 challenge test using  smartphrase (.oxygenchallenge) Last bowel movement Last Bowel Movement Date: 01/17/19 
bowel movement Urinary Catheter LDAs Triple Lumen 01/18/19 Right Femoral (Active) Central Line Being Utilized Yes 1/19/2019  3:05 AM  
Criteria for Appropriate Use Limited/no vessel suitable for conventional peripheral access 1/19/2019  3:05 AM  
Site Assessment Clean, dry, & intact 1/19/2019  3:05 AM  
Infiltration Assessment 0 1/19/2019  3:05 AM  
Affected Extremity/Extremities Color distal to insertion site pink (or appropriate for race) 1/19/2019  3:05 AM  
Date of Last Dressing Change 01/18/19 1/18/2019  8:30 PM  
Dressing Status Clean, dry, & intact 1/19/2019  3:05 AM  
Dressing Type Tape 1/18/2019  4:10 PM  
Action Taken Open ports on tubing capped 1/18/2019  8:30 PM  
Positive Blood Return (Medial Site) Yes 1/18/2019  8:30 PM  
 Medial Hub Color/Line Status Brown 1/18/2019  4:10 PM  
Positive Blood Return (Lateral Site) Yes 1/18/2019  8:30 PM  
Positive Blood Return (Site #3) Yes 1/18/2019  8:30 PM  
Alcohol Cap Used Yes 1/18/2019  8:30 PM  
                      
  
Readmission Risk Assessment Tool Score Medium Risk 25 Total Score 4 IP Visits Last 12 Months (1-3=4, 4=9, >4=11) 9 Pt. Coverage (Medicare=5 , Medicaid, or Self-Pay=4) 5 Charlson Comorbidity Score (Age + Comorbid Conditions) Criteria that do not apply:  
 Has Seen PCP in Last 6 Months (Yes=3, No=0) . Living with Significant Other. Assisted Living. LTAC. SNF. or  
Rehab Patient Length of Stay (>5 days = 3) Expected Length of Stay 4d 19h [facility]

## 2019-01-19 NOTE — PROGRESS NOTES
Hospitalist Progress Note NAME: Merlinda Downing :  1975 MRN:  [id number] Assessment / Plan: 
Sepsis with fever, tachypnea and tachycardia POA in ESRD HD patient 
pleuritic Pain (abdominal) Due to Right Lower Lobe Pneumonia as per CT (Pt does have productive cough and shortness of breath) Fevers resolved Add mucolytics and Scheduled nebs as pt complaining for \"feeling tight\" Continue IV vancomycin, Zosyn and Levaquin F/u blood cultures Check sputum cultures if able to 
CT A/P with Mild airspace disease in the right lower lobe could represent early pneumonia. No evidence of acute abdominal or pelvic process. Stable incidental findings as detailed above Continue norco 
 
ESRD on HD (home HD 5 days on weekdays) HD per nephrology while here CAD Continue ASA Tn Negative AICD in place HTN Chronic systolic heart Failure with EF 10% in , 40 % in 2018 Continue metoprolol Not on ACE/ARBs, likely due to BP issues, ? Renal issues, already on HD Fluid Mx with HD per nephrology H/o Seizure DO: continue keppra, Vimpat H/o DVT Lower Extremity: on Coumadin, pharmacy dosing Hypokalemia 
repleted upon admission Monitor and replete PRN Body mass index is 22.13 kg/m². consistent with normal weight Code status: Full Prophylaxis: Coumadin Recommended Disposition: Home w/Family Subjective: Pt seen and examined at bedside. Continue to feel shortness of breath and having fevers. Overnight events d/w RN Chief Complaint / Reason for Physician Visit; f/u \"Sepsis/PNA\" Review of Systems: 
Symptom Y/N Comments  Symptom Y/N Comments Fever/Chills y   Chest Pain Poor Appetite    Edema Cough y improving  Abdominal Pain y pleuritic Sputum n resolved  Joint Pain SOB/ZAMUDIO y   Pruritis/Rash Nausea/vomit    Tolerating PT/OT Diarrhea    Tolerating Diet y Constipation    Other Could NOT obtain due to:   
 
Objective: VITALS:  
 Last 24hrs VS reviewed since prior progress note. Most recent are: 
Patient Vitals for the past 24 hrs: 
 Temp Pulse Resp BP SpO2  
01/19/19 1220 99.9 °F (37.7 °C) (!) 108 16 117/63 96 % 01/19/19 1119 99.4 °F (37.4 °C) 94 18 101/62   
01/19/19 1115  93 18 102/57   
01/19/19 1100  98 18 102/58   
01/19/19 1045  98 18 90/57   
01/19/19 1030  100 18 91/54   
01/19/19 1015  95 18 (!) 84/49   
01/19/19 1000  95 18 (!) 77/52   
01/19/19 0945  94 18 (!) 86/56   
01/19/19 0930  100 18 (!) 85/55   
01/19/19 0915  97 18 91/57   
01/19/19 0900  94 18 (!) 75/41   
01/19/19 0845  86 18 (!) 88/50   
01/19/19 0833  87 18 95/59   
01/19/19 0818  85 18 97/60   
01/19/19 0800 98.4 °F (36.9 °C) 83 18 104/63   
01/19/19 0700 98.4 °F (36.9 °C) 93 18 100/59 100 % 01/19/19 0305 98.2 °F (36.8 °C) 96 18 93/64 96 % 01/18/19 2340 98.1 °F (36.7 °C) 100 18 96/64 100 % 01/18/19 1942 98 °F (36.7 °C) 94 18 95/62 100 % 01/18/19 1552 97.9 °F (36.6 °C) 83 16 104/65 100 % 01/18/19 1447  80 18 121/89 99 % 01/18/19 1440  84 20 105/75 100 % 01/18/19 1430  89 20 111/77 100 % 01/18/19 1400  79 14 102/66 95 % Intake/Output Summary (Last 24 hours) at 1/19/2019 1335 Last data filed at 1/19/2019 1119 Gross per 24 hour Intake 100 ml Output 200 ml Net -100 ml PHYSICAL EXAM: 
General: WD, WN. Alert, cooperative, no acute distress   
EENT:  EOMI. Anicteric sclerae. MMM Resp:  Minimal crackles at R base, no wheezing or rales. No accessory muscle use CV:  Regular  rhythm,  No edema GI:  Soft, Non distended, Non tender.  +Bowel sounds Neurologic:  Alert and oriented X 3, normal speech, Psych:   Good insight. Not anxious nor agitated Skin:  No rashes. No jaundice Reviewed most current lab test results and cultures  YES Reviewed most current radiology test results   YES Review and summation of old records today    NO Reviewed patient's current orders and MAR    YES 
 PMH/SH reviewed - no change compared to H&P 
________________________________________________________________________ Care Plan discussed with: 
  Comments Patient y Family RN y   
Care Manager Consultant Multidiciplinary team rounds were held today with , nursing, pharmacist and clinical coordinator. Patient's plan of care was discussed; medications were reviewed and discharge planning was addressed. ________________________________________________________________________ Total NON critical care TIME:  25 Minutes Total CRITICAL CARE TIME Spent:   Minutes non procedure based Comments >50% of visit spent in counseling and coordination of care    
________________________________________________________________________ Yamileth Fong MD  
 
Procedures: see electronic medical records for all procedures/Xrays and details which were not copied into this note but were reviewed prior to creation of Plan. LABS: 
I reviewed today's most current labs and imaging studies. Pertinent labs include: 
Recent Labs  
  01/19/19 
0501 01/18/19 
0540 01/17/19 
1420 WBC 7.5 5.0 5.9 HGB 10.8* 11.4* 10.8* HCT 32.7* 34.2* 31.7* * 148* 142* Recent Labs  
  01/19/19 
0501 01/18/19 
1600 01/18/19 
0540 01/17/19 
1420 *  --  132* 128* K 4.1  --  4.3 4.0  
CL 97  --  98 93* CO2 25  --  24 23 GLU 87  --  87 97 BUN 34*  --  27* 63* CREA 10.50*  --  8.02* 14.10* CA 9.0  --  9.1 8.8 INR 3.9* 3.4*  --  2.3* Signed: Yamileth Fong MD

## 2019-01-19 NOTE — PROGRESS NOTES
Dosing of Warfarin Indication: DVT, s/p IVC Filter Goal INR: 2-3 PTA Warfarin Dose: Per PTA med list: 
2.5mg Tabs 5mg M-[id number] 
2.5mg T&Th 
 
Concurrent anticoagulants:  
Concurrent antiplatelet:  
 
Major Interacting Medications ? Drugs that may increase INR:  
? Drugs that may decrease INR:  
 
Conditions that may increase/decrease INR (CHF, C. diff, cirrhosis, thyroid disorder, hypoalbuminemia):  
 
Labs: 
Recent Labs  
  01/19/19 
0501 01/18/19 
1600 01/18/19 
0540 01/17/19 
1420 INR 3.9* 3.4*  --  2.3* HGB 10.8*  --  11.4* 10.8* *  --  148* 142* Impression/Plan: - INR trending up and will continue to HOLD the warfarin  
- Daily INR 
- CBC w/o diff Daily as MD ordered Thanks Rodolfo Borden, PHARMD 
 
[email or web address]/[email or web address]/Warfarin%20Dosing%20Protocol. pdf

## 2019-01-20 VITALS
RESPIRATION RATE: 18 BRPM | TEMPERATURE: 98.7 F | OXYGEN SATURATION: 99 % | DIASTOLIC BLOOD PRESSURE: 74 MMHG | BODY MASS INDEX: 22.46 KG/M2 | SYSTOLIC BLOOD PRESSURE: 102 MMHG | HEART RATE: 95 BPM | HEIGHT: 67 IN | WEIGHT: 143.08 LBS

## 2019-01-20 LAB
ANION GAP SERPL CALC-SCNC: 10 MMOL/L (ref 5–15)
BASOPHILS # BLD: 0 K/UL (ref 0–0.1)
BASOPHILS NFR BLD: 0 % (ref 0–1)
BUN SERPL-MCNC: 18 MG/DL (ref 6–20)
BUN/CREAT SERPL: 2 (ref 12–20)
CALCIUM SERPL-MCNC: 8.5 MG/DL (ref 8.5–10.1)
CHLORIDE SERPL-SCNC: 99 MMOL/L (ref 97–108)
CO2 SERPL-SCNC: 29 MMOL/L (ref 21–32)
CREAT SERPL-MCNC: 7.25 MG/DL (ref 0.7–1.3)
DIFFERENTIAL METHOD BLD: ABNORMAL
EOSINOPHIL # BLD: 2.8 K/UL (ref 0–0.4)
EOSINOPHIL NFR BLD: 33 % (ref 0–7)
ERYTHROCYTE [DISTWIDTH] IN BLOOD BY AUTOMATED COUNT: 15.6 % (ref 11.5–14.5)
GLUCOSE SERPL-MCNC: 76 MG/DL (ref 65–100)
HCT VFR BLD AUTO: 28.3 % (ref 36.6–50.3)
HGB BLD-MCNC: 9.3 G/DL (ref 12.1–17)
IMM GRANULOCYTES # BLD AUTO: 0 K/UL (ref 0–0.04)
IMM GRANULOCYTES NFR BLD AUTO: 0 % (ref 0–0.5)
INR PPP: 2.7 (ref 0.9–1.1)
LYMPHOCYTES # BLD: 0.8 K/UL (ref 0.8–3.5)
LYMPHOCYTES NFR BLD: 9 % (ref 12–49)
MCH RBC QN AUTO: 31.3 PG (ref 26–34)
MCHC RBC AUTO-ENTMCNC: 32.9 G/DL (ref 30–36.5)
MCV RBC AUTO: 95.3 FL (ref 80–99)
MONOCYTES # BLD: 0.7 K/UL (ref 0–1)
MONOCYTES NFR BLD: 8 % (ref 5–13)
NEUTS BAND NFR BLD MANUAL: 1 %
NEUTS SEG # BLD: 4.2 K/UL (ref 1.8–8)
NEUTS SEG NFR BLD: 49 % (ref 32–75)
NRBC # BLD: 0 K/UL (ref 0–0.01)
NRBC BLD-RTO: 0 PER 100 WBC
PLATELET # BLD AUTO: 147 K/UL (ref 150–400)
PMV BLD AUTO: 10.6 FL (ref 8.9–12.9)
POTASSIUM SERPL-SCNC: 3.8 MMOL/L (ref 3.5–5.1)
PROTHROMBIN TIME: 25.8 SEC (ref 9–11.1)
RBC # BLD AUTO: 2.97 M/UL (ref 4.1–5.7)
RBC MORPH BLD: ABNORMAL
SODIUM SERPL-SCNC: 138 MMOL/L (ref 136–145)
WBC # BLD AUTO: 8.5 K/UL (ref 4.1–11.1)

## 2019-01-20 PROCEDURE — 94761 N-INVAS EAR/PLS OXIMETRY MLT: CPT

## 2019-01-20 PROCEDURE — 74011000250 HC RX REV CODE- 250: Performed by: INTERNAL MEDICINE

## 2019-01-20 PROCEDURE — 74011250637 HC RX REV CODE- 250/637: Performed by: INTERNAL MEDICINE

## 2019-01-20 PROCEDURE — 85025 COMPLETE CBC W/AUTO DIFF WBC: CPT

## 2019-01-20 PROCEDURE — 36415 COLL VENOUS BLD VENIPUNCTURE: CPT

## 2019-01-20 PROCEDURE — 74011250636 HC RX REV CODE- 250/636: Performed by: INTERNAL MEDICINE

## 2019-01-20 PROCEDURE — 94640 AIRWAY INHALATION TREATMENT: CPT

## 2019-01-20 PROCEDURE — 80048 BASIC METABOLIC PNL TOTAL CA: CPT

## 2019-01-20 PROCEDURE — 85610 PROTHROMBIN TIME: CPT

## 2019-01-20 RX ORDER — GUAIFENESIN 600 MG/1
600 TABLET, EXTENDED RELEASE ORAL EVERY 12 HOURS
Qty: 14 TAB | Refills: 0 | Status: SHIPPED | OUTPATIENT
Start: 2019-01-20 | End: 2019-01-27

## 2019-01-20 RX ORDER — DOXYCYCLINE HYCLATE 100 MG
100 TABLET ORAL EVERY 12 HOURS
Qty: 10 TAB | Refills: 0 | Status: SHIPPED | OUTPATIENT
Start: 2019-01-20 | End: 2019-01-25

## 2019-01-20 RX ORDER — ACETAMINOPHEN 500 MG
500 TABLET ORAL
Qty: 30 TAB | Refills: 0 | Status: SHIPPED
Start: 2019-01-20

## 2019-01-20 RX ORDER — CEFUROXIME AXETIL 500 MG/1
500 TABLET ORAL DAILY
Qty: 5 TAB | Refills: 0 | Status: SHIPPED | OUTPATIENT
Start: 2019-01-20 | End: 2019-01-25

## 2019-01-20 RX ORDER — ONDANSETRON 4 MG/1
4 TABLET, ORALLY DISINTEGRATING ORAL
Qty: 20 TAB | Refills: 0 | Status: SHIPPED | OUTPATIENT
Start: 2019-01-20 | End: 2020-10-29

## 2019-01-20 RX ORDER — CEFUROXIME AXETIL 250 MG/1
500 TABLET ORAL
Status: DISCONTINUED | OUTPATIENT
Start: 2019-01-20 | End: 2019-01-20 | Stop reason: HOSPADM

## 2019-01-20 RX ORDER — DOXYCYCLINE HYCLATE 100 MG
100 TABLET ORAL EVERY 12 HOURS
Status: DISCONTINUED | OUTPATIENT
Start: 2019-01-20 | End: 2019-01-20 | Stop reason: HOSPADM

## 2019-01-20 RX ORDER — ONDANSETRON 4 MG/1
4 TABLET, ORALLY DISINTEGRATING ORAL
Status: DISCONTINUED | OUTPATIENT
Start: 2019-01-20 | End: 2019-01-20 | Stop reason: HOSPADM

## 2019-01-20 RX ORDER — LEVOFLOXACIN 500 MG/1
500 TABLET, FILM COATED ORAL ONCE
Status: DISCONTINUED | OUTPATIENT
Start: 2019-01-20 | End: 2019-01-20

## 2019-01-20 RX ADMIN — MORPHINE SULFATE 2 MG: 4 INJECTION INTRAVENOUS at 13:06

## 2019-01-20 RX ADMIN — Medication 10 ML: at 13:06

## 2019-01-20 RX ADMIN — IPRATROPIUM BROMIDE AND ALBUTEROL SULFATE 3 ML: .5; 3 SOLUTION RESPIRATORY (INHALATION) at 01:54

## 2019-01-20 RX ADMIN — LACOSAMIDE 50 MG: 100 TABLET, FILM COATED ORAL at 08:35

## 2019-01-20 RX ADMIN — SEVELAMER CARBONATE 1600 MG: 800 TABLET, FILM COATED ORAL at 08:37

## 2019-01-20 RX ADMIN — IPRATROPIUM BROMIDE AND ALBUTEROL SULFATE 3 ML: .5; 3 SOLUTION RESPIRATORY (INHALATION) at 15:30

## 2019-01-20 RX ADMIN — LEVETIRACETAM 750 MG: 500 TABLET, FILM COATED ORAL at 08:36

## 2019-01-20 RX ADMIN — ASPIRIN 81 MG 81 MG: 81 TABLET ORAL at 08:37

## 2019-01-20 RX ADMIN — DOXYCYCLINE HYCLATE 100 MG: 100 TABLET, COATED ORAL at 14:27

## 2019-01-20 RX ADMIN — METOPROLOL SUCCINATE 12.5 MG: 25 TABLET, EXTENDED RELEASE ORAL at 08:37

## 2019-01-20 RX ADMIN — ATORVASTATIN CALCIUM 20 MG: 10 TABLET, FILM COATED ORAL at 08:37

## 2019-01-20 RX ADMIN — CALCITRIOL 0.5 MCG: 0.25 CAPSULE ORAL at 08:37

## 2019-01-20 RX ADMIN — SEVELAMER CARBONATE 1600 MG: 800 TABLET, FILM COATED ORAL at 13:06

## 2019-01-20 RX ADMIN — IPRATROPIUM BROMIDE AND ALBUTEROL SULFATE 3 ML: .5; 3 SOLUTION RESPIRATORY (INHALATION) at 08:23

## 2019-01-20 RX ADMIN — PANTOPRAZOLE SODIUM 40 MG: 40 TABLET, DELAYED RELEASE ORAL at 08:37

## 2019-01-20 RX ADMIN — ONDANSETRON 4 MG: 4 TABLET, ORALLY DISINTEGRATING ORAL at 14:27

## 2019-01-20 RX ADMIN — CEFUROXIME AXETIL 500 MG: 250 TABLET, FILM COATED ORAL at 14:27

## 2019-01-20 RX ADMIN — Medication 10 ML: at 05:14

## 2019-01-20 RX ADMIN — GUAIFENESIN 600 MG: 600 TABLET, EXTENDED RELEASE ORAL at 08:37

## 2019-01-20 NOTE — PROGRESS NOTES
Oncology Nursing Communication Tool 7:13 AM 
1/20/2019 Bedside and Verbal shift change report given to Galdino Curtis RN (incoming nurse) by Felicitas Frankel (outgoing nurse) on Shae Armstrong. Report included the following information SBAR, Kardex and MAR. Shift Summary:  
  
 Issues for physician to address: Oncology Shift Note Admission Date 1/16/2019 Admission Diagnosis Sepsis ([address] Pneumonia Code Status Full Code Consults IP CONSULT TO HOSPITALIST 
IP CONSULT TO NEPHROLOGY Cardiac Monitoring [] Yes [] No  
  
Purposeful Hourly Rounding [] Yes   
Easton Score Total Score: 1 Easton score 3 or > [] Bed Alarm [] Avasys [] 1:1 sitter [] Patient refused (Place signed refusal form in chart) Pain Managed [] Yes [] No  
 Key Pain Meds The patient is on no pain meds. Influenza Vaccine Received Flu Vaccine for Current Season (usually Sept-March): Yes Oxygen needs? [] Room air Oxygen @  []1L    []2L    []3L   []4L    []5L   []6L Use home O2? [] Yes [] No 
Perform O2 challenge test using  smartphrase (.oxygenchallenge) Last bowel movement Last Bowel Movement Date: 01/01/19 
bowel movement Urinary Catheter LDAs Triple Lumen 01/18/19 Right Femoral (Active) Central Line Being Utilized Yes 1/20/2019  3:40 AM  
Criteria for Appropriate Use Limited/no vessel suitable for conventional peripheral access 1/20/2019  3:40 AM  
Site Assessment Clean, dry, & intact 1/20/2019  3:40 AM  
Infiltration Assessment 0 1/20/2019  3:40 AM  
Affected Extremity/Extremities Color distal to insertion site pink (or appropriate for race) 1/20/2019  3:40 AM  
Date of Last Dressing Change 01/18/19 1/19/2019  8:15 PM  
Dressing Status Clean, dry, & intact 1/19/2019  8:15 PM  
Dressing Type Disk with Chlorhexadine gluconate (CHG) 1/19/2019  8:15 PM  
Action Taken Dressing changed 1/19/2019  1:40 PM  
 Positive Blood Return (Medial Site) Yes 1/18/2019  8:30 PM  
Medial Hub Color/Line Status Brown 1/18/2019  4:10 PM  
Positive Blood Return (Lateral Site) Yes 1/18/2019  8:30 PM  
Positive Blood Return (Site #3) Yes 1/18/2019  8:30 PM  
External Catheter Length (cm) 0 centimeters 1/19/2019  1:40 PM  
Alcohol Cap Used Yes 1/19/2019  1:40 PM  
                      
  
Readmission Risk Assessment Tool Score Medium Risk 25 Total Score 4 IP Visits Last 12 Months (1-3=4, 4=9, >4=11) 9 Pt. Coverage (Medicare=5 , Medicaid, or Self-Pay=4) 5 Charlson Comorbidity Score (Age + Comorbid Conditions) Criteria that do not apply:  
 Has Seen PCP in Last 6 Months (Yes=3, No=0) . Living with Significant Other. Assisted Living. LTAC. SNF. or  
Rehab Patient Length of Stay (>5 days = 3) Expected Length of Stay 4d 19h Actual Length of Stay 4 [address],[place]

## 2019-01-20 NOTE — DISCHARGE SUMMARY
Hospitalist Discharge Summary     Patient ID:  Rachele Morales  [id number]  86 y.o.  1975    PCP on record: Stephan Jaime MD    Admit date: 1/16/2019  Discharge date and time: 1/20/2019      DISCHARGE DIAGNOSIS:  Sepsis   Right Lower Lobe Pneumonia   ESRD on HD (home HD 5 days on weekdays)  CAD  AICD in place  HTN  Chronic systolic heart Failure with EF 10% in 2014, 40 % in 2018  H/o Seizure DO  H/o DVT Lower Extremity  Hypokalemia    CONSULTATIONS:  IP CONSULT TO HOSPITALIST  IP CONSULT TO NEPHROLOGY    Excerpted HPI from H&P of Joycelyn Alanis MD:  oJse L Eryn y. o. male with PMHx significant for CAD, CKD, HTN, and sz, presents himself to the ED with cc of constant, aching left sided abdominal pain radiating to back since Monday (1/14/19). Pt reports associated SOB, fever (103F), nausea, vomiting, rhinorrhea, productive cough, and wheezing since onset of pain. He notes no improvement of sx with taking Tylenol. Pt states he recently was in the hospital for sepsis for an infection in November 2018. He is currently a dialysis where he receives dialysis at home (all weekdays). Pt reports of receiving his flu shot this season. He specifically denies sore throat  ______________________________________________________________________  DISCHARGE SUMMARY/HOSPITAL COURSE:  for full details see H&P, daily progress notes, labs, consult notes. Sepsis with fever, tachypnea and tachycardia POA in ESRD HD patient  pleuritic Pain (abdominal)  Due to Right Lower Lobe Pneumonia as per CT (Pt does have productive cough and shortness of breath)  Fevers resolved  Treated with IV vancomycin, Zosyn, Levaquin ordered but only given X1 during the admission  Transition to PO doxycycline and Ceftin to complete coarse of abx  Blood cultures remain negative  CT A/P with Mild airspace disease in the right lower lobe could represent early pneumonia. No evidence of acute abdominal or pelvic process.  Stable incidental findings as detailed above  Having nausea with norco, recommend use tylenol for pain at home     ESRD on HD (home HD 5 days on weekdays)  HD per nephrology while here     CAD  Continue ASA  Tn Negative     AICD in place  HTN  Chronic systolic heart Failure with EF 10% in 2014, 40 % in 2018  Continue metoprolol  Not on ACE/ARBs, likely due to BP issues, ? Renal issues, already on HD  Fluid Mx with HD per nephrology     H/o Seizure DO: continue keppra, Vimpat     H/o DVT Lower Extremity: on Coumadin, inr therapeutic     Hypokalemia  repleted upon admission  resolved  _______________________________________________________________________  Patient seen and examined by me on discharge day. Pertinent Findings:  Gen:    Not in distress  Chest: Clear lungs  CVS:   Regular rhythm. No edema  Abd:  Soft, not distended, not tender  Neuro:  Alert, non focal  _______________________________________________________________________  DISCHARGE MEDICATIONS:   Current Discharge Medication List      START taking these medications    Details   albuterol sulfate 90 mcg/actuation aepb Take 1 Puff by inhalation every six (6) hours as needed. Qty: 1 Inhaler, Refills: 0      cefUROXime (CEFTIN) 500 mg tablet Take 1 Tab by mouth daily for 5 days. To be taken daily after your hemodialysis  Qty: 5 Tab, Refills: 0      doxycycline (VIBRA-TABS) 100 mg tablet Take 1 Tab by mouth every twelve (12) hours for 5 days. Qty: 10 Tab, Refills: 0      guaiFENesin ER (MUCINEX) 600 mg ER tablet Take 1 Tab by mouth every twelve (12) hours for 7 days. Qty: 14 Tab, Refills: 0      ondansetron (ZOFRAN ODT) 4 mg disintegrating tablet Take 1 Tab by mouth every eight (8) hours as needed. Qty: 20 Tab, Refills: 0      acetaminophen (TYLENOL) 500 mg tablet Take 1 Tab by mouth every four (4) hours as needed for Pain.  Over the counter  Qty: 30 Tab, Refills: 0         CONTINUE these medications which have NOT CHANGED    Details   sevelamer carbonate (RENVELA) 800 mg tab tab Take 1,600 mg by mouth three (3) times daily (with meals). Only takes if eating a  meal      !! warfarin (COUMADIN) 2.5 mg tablet Take 2.5 mg by mouth every Tuesday and Thursday. !! warfarin (COUMADIN) 5 mg tablet Take 5 mg by mouth five (5) days a week. All days except Tuesday and Thursday, patient takes 2.5 mg      metoprolol succinate (TOPROL-XL) 25 mg XL tablet Take 12.5 mg by mouth daily. omeprazole (PRILOSEC) 20 mg capsule Take 20 mg by mouth daily. calcitRIOL (ROCALTROL) 0.5 mcg capsule Take 0.5 mcg by mouth daily. levETIRAcetam (KEPPRA) 750 mg tablet Take 750 mg by mouth two (2) times a day. lacosamide (VIMPAT) 50 mg tab tablet Take 50 mg by mouth two (2) times a day. aspirin 81 mg chewable tablet Take 81 mg by mouth daily. !! - Potential duplicate medications found. Please discuss with provider. STOP taking these medications       HYDROcodone-acetaminophen (NORCO) 5-325 mg per tablet Comments:   Reason for Stopping:         atorvastatin (LIPITOR) 20 mg tablet Comments:   Reason for Stopping:               My Recommended Diet, Activity, Wound Care, and follow-up labs are listed in the patient's Discharge Insturctions which I have personally completed and reviewed.     ______________________________________________________________________    Risk of deterioration: Moderate    Condition at Discharge:  Stable  ______________________________________________________________________    Disposition  Home with family, no needs  ______________________________________________________________________    Care Plan discussed with:   Patient, RN    ______________________________________________________________________    Code Status: Full Code  ______________________________________________________________________      Follow up with:   PCP : Stephan Jaime MD  Follow-up Information     Follow up With Specialties Details Why Contact Info    Shanti, Areli Chowdary MD Family Practice Schedule an appointment as soon as possible for a visit in 1 week  [facility]  [phone]                Total time in minutes spent coordinating this discharge (includes going over instructions, follow-up, prescriptions, and preparing report for sign off to her PCP) :  35 minutes    Signed:  Yamileth Fong MD

## 2019-01-20 NOTE — PROGRESS NOTES
PCP MARINO appt scheduled with [facility] to see PT in 24-48 hours after discharge at 9:00am. Appt added to [address] CM Specialist

## 2019-01-20 NOTE — PROGRESS NOTES
Oncology Nursing Communication Tool 
7:04 PM 
1/19/2019 Bedside and Verbal shift change report given to Thalia Almanzar RN (incoming nurse) by Inna Ovalle (outgoing nurse) on [facility]. Report included the following information SBAR, Kardex and MAR. Shift Summary: Pt had dialysis. Controlled pt  Pain. Pt stopped zosyn. Issues for physician to address: None. Oncology Shift Note Admission Date 1/16/2019 Admission Diagnosis Sepsis ([address] Pneumonia Code Status Full Code Consults IP CONSULT TO HOSPITALIST 
IP CONSULT TO NEPHROLOGY Cardiac Monitoring [x] Yes [] No  
  
Purposeful Hourly Rounding [x] Yes   
Easton Score Total Score: 1 Easton score 3 or > [] Bed Alarm [] Avasys [] 1:1 sitter [] Patient refused (Place signed refusal form in chart) Pain Managed [x] Yes [] No  
 Key Pain Meds The patient is on no pain meds. Influenza Vaccine Received Flu Vaccine for Current Season (usually Sept-March): Yes Oxygen needs? [x] Room air Oxygen @  []1L    []2L    []3L   []4L    []5L   []6L Use home O2? [] Yes [] No 
Perform O2 challenge test using  smartphrase (.oxygenchallenge) Last bowel movement Last Bowel Movement Date: 01/17/19 
bowel movement Urinary Catheter LDAs Triple Lumen 01/18/19 Right Femoral (Active) Central Line Being Utilized Yes 1/19/2019  1:40 PM  
Criteria for Appropriate Use Limited/no vessel suitable for conventional peripheral access 1/19/2019  1:40 PM  
Site Assessment Clean, dry, & intact 1/19/2019  1:40 PM  
Infiltration Assessment 0 1/19/2019  1:40 PM  
Affected Extremity/Extremities Color distal to insertion site pink (or appropriate for race) 1/19/2019  3:05 AM  
Date of Last Dressing Change 01/19/19 1/19/2019  1:40 PM  
Dressing Status Clean, dry, & intact;New;Occlusive 1/19/2019  1:40 PM  
Dressing Type Disk with Chlorhexadine gluconate (CHG); Transparent 1/19/2019  1:40 PM  
 Action Taken Dressing changed 1/19/2019  1:40 PM  
Positive Blood Return (Medial Site) Yes 1/18/2019  8:30 PM  
Medial Hub Color/Line Status Brown 1/18/2019  4:10 PM  
Positive Blood Return (Lateral Site) Yes 1/18/2019  8:30 PM  
Positive Blood Return (Site #3) Yes 1/18/2019  8:30 PM  
External Catheter Length (cm) 0 centimeters 1/19/2019  1:40 PM  
Alcohol Cap Used Yes 1/19/2019  1:40 PM  
                      
  
Readmission Risk Assessment Tool Score Medium Risk 25 Total Score 4 IP Visits Last 12 Months (1-3=4, 4=9, >4=11) 9 Pt. Coverage (Medicare=5 , Medicaid, or Self-Pay=4) 5 Charlson Comorbidity Score (Age + Comorbid Conditions) Criteria that do not apply:  
 Has Seen PCP in Last 6 Months (Yes=3, No=0) . Living with Significant Other. Assisted Living. LTAC. SNF. or  
Rehab Patient Length of Stay (>5 days = 3) Expected Length of Stay 4d 19h Actual Length of Stay 3 Harris Chan

## 2019-01-20 NOTE — PROGRESS NOTES
Dosing of Warfarin Indication: DVT, s/p IVC Filter Goal INR: 2-3 PTA Warfarin Dose: Per PTA med list: 
2.5mg Tabs 5mg M-[id number] 
2.5mg T&Th 
 
Concurrent anticoagulants:  
Concurrent antiplatelet:  
 
Major Interacting Medications ? Drugs that may increase INR:  
? Drugs that may decrease INR:  
 
Conditions that may increase/decrease INR (CHF, C. diff, cirrhosis, thyroid disorder, hypoalbuminemia):  
 
Labs: 
Recent Labs  
  01/20/19 
0516 01/19/19 
0501 01/18/19 
1600 01/18/19 
0540 INR 2.7* 3.9* 3.4*  --   
HGB 9.3* 10.8*  --  11.4*  
* 148*  --  148* Impression/Plan: - INR Back to baseline. Will give 3 mg tonight Levofloxacin discontinued today - PTA ADD = 4.3 
- Daily INR 
- CBC w/o diff Daily as MD ordered Thanks Servando Mcginnis, [facility]

## 2019-01-20 NOTE — PROGRESS NOTES
Pharmacy Automatic Renal Dosing Protocol - Antimicrobials Indication for Antimicrobials: RLL-HAP, Sepsis Current Regimen of Each Antimicrobial: 
Vancomycin 1250 mg IV x 1 dose , then 750mg IV after each HD (Start Date 19 Day # 5) Doxycycline 100mg po q12h; start ; Day #1 Cefuroxime 500mg po daily x5 days; start ; Day #1 of 5 Previous Antimicrobial Therapy: 
Zosyn 3.375 gm IV every 12hrs (Start Date 19 Day # 3) Levaquin 750mg IV x1, 500 mg IV every 48hrs   (Start Date 19 Day # 3) Goal Level: VANCOMYCIN TROUGH GOAL RANGE Vancomycin Trough: 15 - 20 mcg/mL Date Dose & Interval Measured (mcg/mL) Extrapolated (mcg/mL)  0501 750 mg with HD 19.1 HD Date & time of next level:  
random level before the second dose of 750 mg IV, first maintenance dose due today after HD Significant Cultures:  
19 Blood = NG  = pending Radiology / Imaging results: (X-ray, CT scan or MRI):  
 
Paralysis, amputations, malnutrition: NA 
 
Labs: 
Recent Labs  
  19 
0516 CREA 7.25* BUN 18 WBC 8.5 Temp (24hrs), Av.6 °F (37.6 °C), Min:98 °F (36.7 °C), Max:103 °F (39.4 °C) Creatinine Clearance (mL/min) or Dialysis:   ESRD-HD, Home HD 5 days a week Impression/Plan: · Will continue with Vancomycin 750 mg IV at the end of each HD. ([facility] HD /) · Levofloxacin & Zosyn dc'd · Added Doxycycline 100mg po q12h (no stop date) · Added Cefuroxime 500mg po daily x5 days · Usual HD dose is 250-500mg q48h with supplemental dose after HD; however, dose home HD -, so will keep Cefuroxime 500mg daily · Antimicrobial stop date:  Cefuroxime: 5 days; Vancomycin & Doxycycline: TBD Pharmacy will follow daily and adjust medications as appropriate for renal function and/or serum levels. Thank you, 
Alex Carty, [facility] Recommended duration of therapy 
[email or web address]/Pharmacy/[email or web address] nion/Duration%20of%20ABX%20therapy. docx Renal Dosing 
http://[facility]/[place]/[facility]/Pharmacy/Clinical%20Companion/Renal%[id number]. pdf

## 2019-01-20 NOTE — DISCHARGE INSTRUCTIONS
HOSPITALIST DISCHARGE INSTRUCTIONS    NAME: Chantale Cobb   :  1975   MRN:  [id number]     Date/Time:  2019 1:49 PM    ADMIT DATE: 2019     DISCHARGE DATE: 2019     DISCHARGE DIAGNOSIS:  Sepsis   Right Lower Lobe Pneumonia   ESRD on HD (home HD 5 days on weekdays)  CAD  AICD in place  HTN  Chronic systolic heart Failure with EF 10% in , 40 % in 2018  H/o Seizure DO  H/o DVT Lower Extremity  Hypokalemia    MEDICATIONS:  · It is important that you take the medication exactly as they are prescribed. · Keep your medication in the bottles provided by the pharmacist and keep a list of the medication names, dosages, and times to be taken in your wallet. · Do not take other medications without consulting your doctor. Pain Management: per above medications    What to do at Home    Recommended diet:  Resume previous diet    Recommended activity: Activity as tolerated    If you have questions regarding the hospital related prescriptions or hospital related issues please call [facility] shreya Price at [phone]. If you experience any of the following symptoms then please call your primary care physician or return to the emergency room if you cannot get hold of your doctor:  Fever, chills, nausea, vomiting, diarrhea, change in mentation, falling, bleeding, shortness of breath      Information obtained by :  I understand that if any problems occur once I am at home I am to contact my physician. I understand and acknowledge receipt of the instructions indicated above.                                                                                                                                            Physician's or R.N.'s Signature                                                                  Date/Time                                                                                                                                              Patient or Representative Signature                                                          Date/Time

## 2019-01-21 LAB
BACTERIA SPEC CULT: NORMAL
SERVICE CMNT-IMP: NORMAL

## 2020-07-23 ENCOUNTER — OFFICE VISIT (OUTPATIENT)
Dept: SURGERY | Age: 45
End: 2020-07-23

## 2020-07-23 VITALS
SYSTOLIC BLOOD PRESSURE: 184 MMHG | DIASTOLIC BLOOD PRESSURE: 68 MMHG | RESPIRATION RATE: 18 BRPM | WEIGHT: 150 LBS | HEIGHT: 67 IN | TEMPERATURE: 98.3 F | HEART RATE: 99 BPM | BODY MASS INDEX: 23.54 KG/M2 | OXYGEN SATURATION: 96 %

## 2020-07-23 DIAGNOSIS — Z99.2 ESRD (END STAGE RENAL DISEASE) ON DIALYSIS (HCC): Primary | ICD-10-CM

## 2020-07-23 DIAGNOSIS — N18.6 ESRD (END STAGE RENAL DISEASE) ON DIALYSIS (HCC): Primary | ICD-10-CM

## 2020-07-23 NOTE — PROGRESS NOTES
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 The patient had originally a right upper arm transposed AV fistula. He had had multiple interventions and stents and had pseudoaneurysm formation as well. He required replacement of the AV fistula with an AV graft on 11/14/17. Since that time, he has had a number of episodes of graft thrombosis and has had multiple interventions including stent at venous anastomosis, stent in the right innominate vein and stent graft placement within the graft. He presently is being dialyzed by way of a right femoral vein catheter. The patient has a history of a left-sided defibrillator. He reports that he had a number of years ago left femoral popliteal bypass. On examination the patient has palpable radial and ulnar pulses bilaterally. AV graft right upper arm has no thrill or bruit. I think in this situation the patient might be a candidate for a HeRO graft. I will refer the patient to Dr. April Torres to be evaluated for that possibility. I think that the current graft in the right arm is salvageable. The patient reports that Dr. Flaca Teague actually did his femoral popliteal bypass in the left lower extremity a number of years ago. The patient unfortunately has had a number of abdominal surgeries including bowel resection  and therefore I am doubtful that he would be able to utilize peritoneal dialysis. My office staff will assist the patient in getting an appointment expeditiously with Dr. Flaca Teague. Final Diagnosis: End-stage renal disease. c: Sammi Amezquita MD    Addendum:  The patient is on aspirin and Coumadin. He has history of heparin induced thrombocytopenia and thrombosis.     James Olivares MD

## 2020-08-12 ENCOUNTER — TELEPHONE (OUTPATIENT)
Dept: SURGERY | Age: 45
End: 2020-08-12

## 2020-08-12 NOTE — TELEPHONE ENCOUNTER
Informed patient has appointment with Dr. Bill Caballero 8/17/2020 @10:30 am. Express understanding.

## 2020-08-12 NOTE — TELEPHONE ENCOUNTER
Patient is following up to see if anything has been done with referring him to Dr. Rolando Severino. Please call.

## 2020-09-02 NOTE — PERIOP NOTES
[facility]  Preoperative Instructions        Surgery Date 9/8/20          Time of Arrival 0545    1. On the day of your surgery, please report to the Surgical Services Registration Desk and sign in at your designated time. The Surgery Center is located to the right of the Emergency Room. 2. You must have someone with you to drive you home. You should not drive a car for 24 hours following surgery. Please make arrangements for a friend or family member to stay with you for the first 24 hours after your surgery. 3. Do not have anything to eat or drink (including water, gum, mints, coffee, juice) after midnight 9/7/20?? Jim Ace ? This may not apply to medications prescribed by your physician. ?(Please note below the special instructions with medications to take the morning of your procedure.)    4. We recommend you do not drink any alcoholic beverages for 24 hours before and after your surgery. 5. Contact your surgeons office for instructions on the following medications: non-steroidal anti-inflammatory drugs (i.e. Advil, Aleve), vitamins, and supplements. (Some surgeons will want you to stop these medications prior to surgery and others may allow you to take them)  **If you are currently taking Plavix, Coumadin, Aspirin and/or other blood-thinning agents, contact your surgeon for instructions. ** Your surgeon will partner with the physician prescribing these medications to determine if it is safe to stop or if you need to continue taking. Please do not stop taking these medications without instructions from your surgeon    6. Wear comfortable clothes. Wear glasses instead of contacts. Do not bring any money or jewelry. Please bring picture ID, insurance card, and any prearranged co-payment or hospital payment. Do not wear make-up, particularly mascara the morning of your surgery. Do not wear nail polish, particularly if you are having foot /hand surgery.   Wear your hair loose or down, no ponytails, buns, lavell pins or clips. All body piercings must be removed. Please shower with antibacterial soap for three consecutive days before and on the morning of surgery, but do not apply any lotions, powders or deodorants after the shower on the day of surgery. Please use a fresh towels after each shower. Please sleep in clean clothes and change bed linens the night before surgery. Please do not shave for 48 hours prior to surgery. Shaving of the face is acceptable. 7. You should understand that if you do not follow these instructions your surgery may be cancelled. If your physical condition changes (I.e. fever, cold or flu) please contact your surgeon as soon as possible. 8. It is important that you be on time. If a situation occurs where you may be late, please call [phone] (OR Holding Area). 9. If you have any questions and or problems, please call [phone] (Pre-admission Testing). 10. Your surgery time may be subject to change. You will receive a phone call the evening prior if your time changes. 11.  If having outpatient surgery, you must have someone to drive you here, stay with you during the duration of your stay, and to drive you home at time of discharge. Special Instructions: Coumadin=stop 5 days before surgery. TAKE ALL MEDICATIONS DAY OF SURGERY EXCEPT:no exceptions. I understand a pre-operative phone call will be made to verify my surgery time. In the event that I am not available, I give permission for a message to be left on my answering service and/or with another person?   {yes @ [phone].         ___________________      __________   _________    (Signature of Patient)             (Witness)                (Date and Time)

## 2020-09-02 NOTE — PERIOP NOTES
Patient states remote MRSA hx. Left msg making them aware & does Dr. Rayne Matute want to add vanc? Req cb or new orders.

## 2020-09-04 ENCOUNTER — HOSPITAL ENCOUNTER (OUTPATIENT)
Dept: PREADMISSION TESTING | Age: 45
Discharge: HOME | End: 2020-09-04

## 2020-09-08 ENCOUNTER — ANESTHESIA (OUTPATIENT)
Dept: SURGERY | Age: 45
End: 2020-09-08
Payer: MEDICARE

## 2020-09-08 ENCOUNTER — HOSPITAL ENCOUNTER (OUTPATIENT)
Age: 45
Setting detail: OUTPATIENT SURGERY
Discharge: HOME | End: 2020-09-08
Attending: SURGERY | Admitting: SURGERY
Payer: MEDICARE

## 2020-09-08 ENCOUNTER — ANESTHESIA EVENT (OUTPATIENT)
Dept: SURGERY | Age: 45
End: 2020-09-08
Payer: MEDICARE

## 2020-09-08 VITALS
WEIGHT: 147.05 LBS | OXYGEN SATURATION: 100 % | SYSTOLIC BLOOD PRESSURE: 123 MMHG | RESPIRATION RATE: 18 BRPM | TEMPERATURE: 98.5 F | HEIGHT: 67 IN | HEART RATE: 93 BPM | DIASTOLIC BLOOD PRESSURE: 55 MMHG | BODY MASS INDEX: 23.08 KG/M2

## 2020-09-08 DIAGNOSIS — N18.6 ESRD (END STAGE RENAL DISEASE) ON DIALYSIS (HCC): Primary | Chronic | ICD-10-CM

## 2020-09-08 DIAGNOSIS — Z99.2 ESRD (END STAGE RENAL DISEASE) ON DIALYSIS (HCC): Primary | Chronic | ICD-10-CM

## 2020-09-08 LAB
ANION GAP BLD CALC-SCNC: 18 MMOL/L (ref 10–20)
BUN BLD-MCNC: 60 MG/DL (ref 9–20)
CA-I BLD-MCNC: 1.17 MMOL/L (ref 1.12–1.32)
CHLORIDE BLD-SCNC: 103 MMOL/L (ref 98–107)
CO2 BLD-SCNC: 19 MMOL/L (ref 21–32)
CREAT BLD-MCNC: 15.8 MG/DL (ref 0.6–1.3)
GLUCOSE BLD-MCNC: 77 MG/DL (ref 65–100)
HCT VFR BLD CALC: 36 % (ref 36.6–50.3)
INR BLD: 1.4 (ref 0.9–1.2)
POTASSIUM BLD-SCNC: 4.7 MMOL/L (ref 3.5–5.1)
SERVICE CMNT-IMP: ABNORMAL
SODIUM BLD-SCNC: 134 MMOL/L (ref 136–145)

## 2020-09-08 PROCEDURE — 74011250636 HC RX REV CODE- 250/636: Performed by: ANESTHESIOLOGY

## 2020-09-08 PROCEDURE — 77030011640 HC PAD GRND REM COVD -A: Performed by: SURGERY

## 2020-09-08 PROCEDURE — 76210000016 HC OR PH I REC 1 TO 1.5 HR: Performed by: SURGERY

## 2020-09-08 PROCEDURE — 77030031753 HC SHR ENDO COAG HARM J&J -E: Performed by: SURGERY

## 2020-09-08 PROCEDURE — 77030038692 HC WND DEB SYS IRMX -B: Performed by: SURGERY

## 2020-09-08 PROCEDURE — 77030002933 HC SUT MCRYL J&J -A: Performed by: SURGERY

## 2020-09-08 PROCEDURE — 77030002987 HC SUT PROL J&J -B: Performed by: SURGERY

## 2020-09-08 PROCEDURE — C1768 GRAFT, VASCULAR: HCPCS | Performed by: SURGERY

## 2020-09-08 PROCEDURE — 77030010507 HC ADH SKN DERMBND J&J -B: Performed by: SURGERY

## 2020-09-08 PROCEDURE — 74011250636 HC RX REV CODE- 250/636: Performed by: SURGERY

## 2020-09-08 PROCEDURE — 77030002916 HC SUT ETHLN J&J -A: Performed by: SURGERY

## 2020-09-08 PROCEDURE — 74011250636 HC RX REV CODE- 250/636: Performed by: NURSE ANESTHETIST, CERTIFIED REGISTERED

## 2020-09-08 PROCEDURE — 76060000035 HC ANESTHESIA 2 TO 2.5 HR: Performed by: SURGERY

## 2020-09-08 PROCEDURE — 74011250637 HC RX REV CODE- 250/637: Performed by: SURGERY

## 2020-09-08 PROCEDURE — 77030014008 HC SPNG HEMSTAT J&J -C: Performed by: SURGERY

## 2020-09-08 PROCEDURE — 76010000131 HC OR TIME 2 TO 2.5 HR: Performed by: SURGERY

## 2020-09-08 PROCEDURE — 77030008463 HC STPLR SKN PROX J&J -B: Performed by: SURGERY

## 2020-09-08 PROCEDURE — 77030020256 HC SOL INJ NACL 0.9%  500ML: Performed by: SURGERY

## 2020-09-08 PROCEDURE — 77030002986 HC SUT PROL J&J -A: Performed by: SURGERY

## 2020-09-08 PROCEDURE — 77030031139 HC SUT VCRL2 J&J -A: Performed by: SURGERY

## 2020-09-08 PROCEDURE — 85610 PROTHROMBIN TIME: CPT

## 2020-09-08 PROCEDURE — 80047 BASIC METABLC PNL IONIZED CA: CPT

## 2020-09-08 PROCEDURE — 77030002924 HC SUT GORTX WLGO -B: Performed by: SURGERY

## 2020-09-08 PROCEDURE — 74011250636 HC RX REV CODE- 250/636

## 2020-09-08 PROCEDURE — 74011000258 HC RX REV CODE- 258: Performed by: SURGERY

## 2020-09-08 PROCEDURE — 77030002996 HC SUT SLK J&J -A: Performed by: SURGERY

## 2020-09-08 PROCEDURE — 76210000026 HC REC RM PH II 1 TO 1.5 HR: Performed by: SURGERY

## 2020-09-08 DEVICE — STRETCH VASCULAR GRAFT TW 7MMX40CM
Type: IMPLANTABLE DEVICE | Site: ARM | Status: FUNCTIONAL
Brand: GORE-TEX   VASCULAR GRAFT

## 2020-09-08 RX ORDER — SODIUM CHLORIDE, SODIUM LACTATE, POTASSIUM CHLORIDE, CALCIUM CHLORIDE 600; 310; 30; 20 MG/100ML; MG/100ML; MG/100ML; MG/100ML
100 INJECTION, SOLUTION INTRAVENOUS CONTINUOUS
Status: DISCONTINUED | OUTPATIENT
Start: 2020-09-08 | End: 2020-09-08 | Stop reason: HOSPADM

## 2020-09-08 RX ORDER — ONDANSETRON 2 MG/ML
4 INJECTION INTRAMUSCULAR; INTRAVENOUS AS NEEDED
Status: DISCONTINUED | OUTPATIENT
Start: 2020-09-08 | End: 2020-09-08 | Stop reason: HOSPADM

## 2020-09-08 RX ORDER — SODIUM CHLORIDE 0.9 % (FLUSH) 0.9 %
5-40 SYRINGE (ML) INJECTION AS NEEDED
Status: DISCONTINUED | OUTPATIENT
Start: 2020-09-08 | End: 2020-09-08 | Stop reason: HOSPADM

## 2020-09-08 RX ORDER — FENTANYL CITRATE 50 UG/ML
INJECTION, SOLUTION INTRAMUSCULAR; INTRAVENOUS
Status: COMPLETED
Start: 2020-09-08 | End: 2020-09-08

## 2020-09-08 RX ORDER — HYDROMORPHONE HYDROCHLORIDE 1 MG/ML
0.5 INJECTION, SOLUTION INTRAMUSCULAR; INTRAVENOUS; SUBCUTANEOUS
Status: DISCONTINUED | OUTPATIENT
Start: 2020-09-08 | End: 2020-09-08 | Stop reason: HOSPADM

## 2020-09-08 RX ORDER — HYDROCODONE BITARTRATE AND ACETAMINOPHEN 5; 325 MG/1; MG/1
1 TABLET ORAL
Qty: 25 TAB | Refills: 0 | Status: SHIPPED | OUTPATIENT
Start: 2020-09-08 | End: 2020-09-13

## 2020-09-08 RX ORDER — ROPIVACAINE HYDROCHLORIDE 5 MG/ML
INJECTION, SOLUTION EPIDURAL; INFILTRATION; PERINEURAL
Status: COMPLETED | OUTPATIENT
Start: 2020-09-08 | End: 2020-09-08

## 2020-09-08 RX ORDER — ROPIVACAINE HYDROCHLORIDE 5 MG/ML
30 INJECTION, SOLUTION EPIDURAL; INFILTRATION; PERINEURAL AS NEEDED
Status: DISCONTINUED | OUTPATIENT
Start: 2020-09-08 | End: 2020-09-08 | Stop reason: HOSPADM

## 2020-09-08 RX ORDER — MIDAZOLAM HYDROCHLORIDE 1 MG/ML
0.5 INJECTION, SOLUTION INTRAMUSCULAR; INTRAVENOUS
Status: DISCONTINUED | OUTPATIENT
Start: 2020-09-08 | End: 2020-09-08 | Stop reason: HOSPADM

## 2020-09-08 RX ORDER — MIDAZOLAM HYDROCHLORIDE 1 MG/ML
1 INJECTION, SOLUTION INTRAMUSCULAR; INTRAVENOUS AS NEEDED
Status: DISCONTINUED | OUTPATIENT
Start: 2020-09-08 | End: 2020-09-08 | Stop reason: HOSPADM

## 2020-09-08 RX ORDER — PROPOFOL 10 MG/ML
INJECTION, EMULSION INTRAVENOUS
Status: DISCONTINUED | OUTPATIENT
Start: 2020-09-08 | End: 2020-09-08 | Stop reason: HOSPADM

## 2020-09-08 RX ORDER — MORPHINE SULFATE 10 MG/ML
2 INJECTION, SOLUTION INTRAMUSCULAR; INTRAVENOUS
Status: DISCONTINUED | OUTPATIENT
Start: 2020-09-08 | End: 2020-09-08 | Stop reason: HOSPADM

## 2020-09-08 RX ORDER — FENTANYL CITRATE 50 UG/ML
25 INJECTION, SOLUTION INTRAMUSCULAR; INTRAVENOUS
Status: DISCONTINUED | OUTPATIENT
Start: 2020-09-08 | End: 2020-09-08 | Stop reason: HOSPADM

## 2020-09-08 RX ORDER — SODIUM CHLORIDE 9 MG/ML
25 INJECTION, SOLUTION INTRAVENOUS CONTINUOUS
Status: DISCONTINUED | OUTPATIENT
Start: 2020-09-08 | End: 2020-09-08 | Stop reason: HOSPADM

## 2020-09-08 RX ORDER — FENTANYL CITRATE 50 UG/ML
50 INJECTION, SOLUTION INTRAMUSCULAR; INTRAVENOUS AS NEEDED
Status: DISCONTINUED | OUTPATIENT
Start: 2020-09-08 | End: 2020-09-08 | Stop reason: HOSPADM

## 2020-09-08 RX ORDER — SODIUM CHLORIDE 0.9 % (FLUSH) 0.9 %
5-40 SYRINGE (ML) INJECTION EVERY 8 HOURS
Status: DISCONTINUED | OUTPATIENT
Start: 2020-09-08 | End: 2020-09-08 | Stop reason: HOSPADM

## 2020-09-08 RX ORDER — DIPHENHYDRAMINE HYDROCHLORIDE 50 MG/ML
12.5 INJECTION, SOLUTION INTRAMUSCULAR; INTRAVENOUS AS NEEDED
Status: DISCONTINUED | OUTPATIENT
Start: 2020-09-08 | End: 2020-09-08 | Stop reason: HOSPADM

## 2020-09-08 RX ORDER — LIDOCAINE HYDROCHLORIDE 10 MG/ML
0.1 INJECTION, SOLUTION EPIDURAL; INFILTRATION; INTRACAUDAL; PERINEURAL AS NEEDED
Status: DISCONTINUED | OUTPATIENT
Start: 2020-09-08 | End: 2020-09-08 | Stop reason: HOSPADM

## 2020-09-08 RX ORDER — ACETAMINOPHEN 325 MG/1
650 TABLET ORAL ONCE
Status: DISCONTINUED | OUTPATIENT
Start: 2020-09-08 | End: 2020-09-08 | Stop reason: HOSPADM

## 2020-09-08 RX ADMIN — SODIUM CHLORIDE 25 ML/HR: 900 INJECTION, SOLUTION INTRAVENOUS at 07:10

## 2020-09-08 RX ADMIN — Medication 3 AMPULE: at 07:07

## 2020-09-08 RX ADMIN — SODIUM CHLORIDE 80 MCG/MIN: 900 INJECTION, SOLUTION INTRAVENOUS at 08:15

## 2020-09-08 RX ADMIN — PROPOFOL 50 MCG/KG/MIN: 10 INJECTION, EMULSION INTRAVENOUS at 07:45

## 2020-09-08 RX ADMIN — FENTANYL CITRATE 25 MCG: 50 INJECTION, SOLUTION INTRAMUSCULAR; INTRAVENOUS at 10:17

## 2020-09-08 RX ADMIN — ROPIVACAINE HYDROCHLORIDE 20 ML: 5 INJECTION, SOLUTION EPIDURAL; INFILTRATION; PERINEURAL at 07:32

## 2020-09-08 RX ADMIN — VANCOMYCIN HYDROCHLORIDE 1000 MG: 1 INJECTION, POWDER, LYOPHILIZED, FOR SOLUTION INTRAVENOUS at 07:10

## 2020-09-08 RX ADMIN — BIVALIRUDIN 0.25 MG/KG/HR: 250 INJECTION, POWDER, LYOPHILIZED, FOR SOLUTION INTRAVENOUS at 08:24

## 2020-09-08 RX ADMIN — FENTANYL CITRATE 25 MCG: 50 INJECTION, SOLUTION INTRAMUSCULAR; INTRAVENOUS at 10:12

## 2020-09-08 NOTE — DISCHARGE INSTRUCTIONS
Patient Discharge Instructions    Lucian Prieto / [id number] : 1975    Admitted 2020 Discharged: 2020     Take Home Medications     · It is important that you take the medication exactly as they are prescribed. · Keep your medication in the bottles provided by the pharmacist and keep a list of the medication names, dosages, and times to be taken in your wallet. · Do not take other medications without consulting your doctor. What to do at [address] [place] [address] [place]: Remove current bandage in 2 days then apply dry dressing to inner upper arm incision daily    Recommended diet: Renal    Recommended activity: As Tolerated. No Strenuous activity or heavy lifting with left arm    If you experience any of the following symptoms severe left hand pain, numbness, weakness, or discoloration, please follow up with Dr Brianda Grier immediately. Follow-up with Dr Brianda Grier in 2-3 weeks at the [address] [place] [address] obtained by :  I understand that if any problems occur once I am at home I am to contact my physician. I understand and acknowledge receipt of the instructions indicated above. Physician's or R.N.'s Signature                                                                  Date/Time                                                                                                                                              Patient or Representative Signature                                                          Date/Time    TO PREVENT AN INFECTION      1. [address] YOUR HANDS     To prevent infection, good handwashing is the most important thing you or your caregiver can do.  Wash your hands with soap and water or use the hand  we gave you before you touch any wounds.     2. SHOWER     Use the antibacterial soap we gave you when you take a shower.  Shower with this soap until your wounds are healed.  To reach all areas of your body, you may need someone to help you.  Dont forget to clean your belly button with every shower. 3.  USE CLEAN SHEETS     Use freshly cleaned sheets on your bed after surgery.  To keep the surgery site clean, do not allow pets to sleep with you while your wound is still healing. 4. STOP SMOKING     Stop smoking, or at least cut back on smoking     Smoking slows your healing. 5.  CONTROL YOUR BLOOD SUGAR     High blood sugars slow wound healing. If you are diabetic, control your blood sugar levels before and after your surgery. DISCHARGE SUMMARY from Nurse    The following personal items are in your possession at time of discharge:    Dental Appliances: None  Visual Aid: None  Home Medications: None  Jewelry: None  Clothing: None (left in in pt. room)  Other Valuables: None             PATIENT INSTRUCTIONS:    After general anesthesia or intravenous sedation, for 24 hours or while taking prescription Narcotics:  Limit your activities  Do not drive and operate hazardous machinery  Do not make important personal or business decisions  Do  not drink alcoholic beverages  If you have not urinated within 8 hours after discharge, please contact your surgeon on call. Report the following to your surgeon:  Excessive pain, swelling, redness or odor of or around the surgical area  Temperature over 100.5  Nausea and vomiting lasting longer than 4 hours or if unable to take medications  Any signs of decreased circulation or nerve impairment to extremity: change in color, persistent  numbness, tingling, coldness or increase pain  Any questions    To prevent infection remember to refer to your handout on handwashing given to you by your nurse. *  Please give a list of your current medications to your Primary Care Provider.     *  Please update this list whenever your medications are discontinued, doses are      changed, or new medications (including over-the-counter products) are added. *  Please carry medication information at all times in case of emergency situations. These are general instructions for a healthy lifestyle:    No smoking/ No tobacco products/ Avoid exposure to second hand smoke    Surgeon General's Warning:  Quitting smoking now greatly reduces serious risk to your health. Obesity, smoking, and sedentary lifestyle greatly increases your risk for illness    A healthy diet, regular physical exercise & weight monitoring are important for maintaining a healthy lifestyle    You may be retaining fluid if you have a history of heart failure or if you experience any of the following symptoms:  Weight gain of 3 pounds or more overnight or 5 pounds in a week, increased swelling in our hands or feet or shortness of breath while lying flat in bed. Please call your doctor as soon as you notice any of these symptoms; do not wait until your next office visit. Recognize signs and symptoms of STROKE:    F-face looks uneven    A-arms unable to move or move unevenly    S-speech slurred or non-existent    T-time-call 911 as soon as signs and symptoms begin-DO NOT go       Back to bed or wait to see if you get better-TIME IS BRAIN. The discharge information has been reviewed with the patient. The patient verbalized understanding. Patient Education      Hydrocodone/Acetaminophen (Vicodin, Norco, Lortab) - (By mouth)   Why this medicine is used:   Treats pain.   Contact a nurse or doctor right away if you have:  Blistering, peeling, red skin rash  Fast or slow heartbeat, shallow breathing, blue lips, fingernails, or skin  Anxiety, restlessness, muscle spasms, twitching, seeing or hearing things that are not there  Dark urine or pale stools, yellow skin or eyes  Extreme weakness, sweating, seizures, cold or clammy skin  Lightheadedness, dizziness, fainting, fever, sweating Common side effects:  Constipation, nausea, vomiting, loss of appetite, stomach pain  Tiredness or sleepiness  © 2017 [facility] Information is for End User's use only and may not be sold, redistributed or otherwise used for commercial purposes.

## 2020-09-08 NOTE — PERIOP NOTES
Surgicel Fibrillar Absorbable Hemostat 4in x 4in Reference number: 1961 Lot Number: [id number] Expiration Date: 2022/10/31 Irrisept Wound Debridement and Cleansing System  Ref: Marcus Dear: [id number] LOT: [id number] Expiration Date: 2022/06/30

## 2020-09-08 NOTE — PROGRESS NOTES
Patient discharged to home. Discharge instructions, scripts, and medication education done with patient and sister.

## 2020-09-08 NOTE — ANESTHESIA POSTPROCEDURE EVALUATION
Procedure(s):  CREATION LEFT ARM ARTERIO VENOUS GRAFT.    regional    Anesthesia Post Evaluation        Patient location during evaluation: PACU  Note status: Adequate. Level of consciousness: responsive to verbal stimuli and sleepy but conscious  Pain management: satisfactory to patient  Airway patency: patent  Anesthetic complications: no  Cardiovascular status: acceptable  Respiratory status: acceptable  Hydration status: acceptable  Comments: +Post-Anesthesia Evaluation and Assessment    Patient: Sigrid Bolaños MRN: [id number]  SSN: [id number]   YOB: 1975  Age: 45 y.o. Sex: male          Cardiovascular Function/Vital Signs    /61   Pulse 95   Temp 37.1 °C (98.8 °F)   Resp 14   Ht 5' 7\" (1.702 m)   Wt 66.7 kg (147 lb 0.8 oz)   SpO2 98%   BMI 23.03 kg/m²     Patient is status post Procedure(s):  CREATION LEFT ARM ARTERIO VENOUS GRAFT. Nausea/Vomiting: Controlled. Postoperative hydration reviewed and adequate. Pain:  Pain Scale 1: Numeric (0 - 10) (09/08/20 1045)  Pain Intensity 1: 1 (09/08/20 1045)   Managed. Neurological Status:   Neuro (WDL): Exceptions to WDL (09/08/20 0958)   At baseline. Mental Status and Level of Consciousness: Arousable. Pulmonary Status:   O2 Device: Room air (09/08/20 1045)   Adequate oxygenation and airway patent. Complications related to anesthesia: None    Post-anesthesia assessment completed. No concerns. I have evaluated the patient and the patient is stable and ready to be discharged from PACU . Signed By: Patrizia Pinedo MD    9/8/2020        INITIAL Post-op Vital signs:   Vitals Value Taken Time   /67 9/8/2020 11:00 AM   Temp 37.1 °C (98.8 °F) 9/8/2020 10:02 AM   Pulse 92 9/8/2020 11:03 AM   Resp 15 9/8/2020 11:03 AM   SpO2 100 % 9/8/2020 11:03 AM   Vitals shown include unvalidated device data.

## 2020-09-08 NOTE — BRIEF OP NOTE
Brief Postoperative Note    Patient: Ziyad Casey  YOB: 1975  MRN: [id number]    Date of Procedure: 9/8/2020     Pre-Op Diagnosis: ESRD    Post-Op Diagnosis: Same as preoperative diagnosis. Procedure(s):  CREATION LEFT ARM ARTERIO VENOUS GRAFT    Surgeon(s):  Dhara Saeed MD    Surgical Assistant: None    Anesthesia: MAC     Estimated Blood Loss (mL): less than 50     Complications: None    Specimens: * No specimens in log *     Implants:   Implant Name Type Inv. Item Serial No.  Lot No. LRB No. Used Action   GRAFT VASC TW STRTCH [id number] -- Virgil Baer  GRAFT VASC TW STRTCH [id number] -- Cassius Gitelman [id number]  [employer] NA Left 1 Implanted       Drains: * No LDAs found *    Findings: LUE loop graft. Good thrill.     Electronically Signed by Rakesh Up MD on 9/8/2020 at 9:50 AM

## 2020-09-08 NOTE — ANESTHESIA PROCEDURE NOTES
Peripheral Block    Start time: 9/8/2020 7:04 AM  End time: 9/8/2020 7:14 AM  Performed by: Nuvia Griffin MD  Authorized by: Nuvia Griffin MD       Pre-procedure:    Indications: at surgeon's request and post-op pain management    Preanesthetic Checklist: patient identified, risks and benefits discussed, site marked, timeout performed, anesthesia consent given and patient being monitored      Block Type:   Block Type:  Supraclavicular  Laterality:  Left  Monitoring:  Standard ASA monitoring, continuous pulse ox, frequent vital sign checks, heart rate, responsive to questions and oxygen  Injection Technique:  Single shot  Procedures: ultrasound guided    Patient Position: supine  Prep: chlorhexidine    Location:  Supraclavicular  Needle Type:  Stimuplex  Needle Gauge:  22 G  Needle Localization:  Ultrasound guidance  Motor Response: minimal motor response >0.4 mA      Assessment:  Number of attempts:  1  Injection Assessment:  Incremental injection every 5 mL, local visualized surrounding nerve on ultrasound, negative aspiration for blood, no intravascular symptoms and no paresthesia  Patient tolerance:  Patient tolerated the procedure well with no immediate complications

## 2020-09-08 NOTE — PERIOP NOTES
[id number] Report from Operating Room to PACU    Report received from NIRALI Page RN and Lizette Benton regarding [place]. Surgeon(s):  Ambrose Torres MD  And Procedure(s) (LRB):  CREATION LEFT ARM ARTERIO VENOUS GRAFT (Left)  confirmed   with allergies and dressings discussed. Anesthesia type, drugs, patient history, complications, estimated blood loss, vital signs, intake and output, and last pain medication, lines, reversal medications and temperature were reviewed. 65- Sister updated via telephone. 1100- TRANSFER - OUT REPORT:    Verbal report given to Flores BILLINGS(name) on [place]  being transferred to phase II(unit) for routine post - op       Report consisted of patients Situation, Background, Assessment and   Recommendations(SBAR). Information from the following report(s) SBAR, Kardex, OR Summary, Procedure Summary, Intake/Output, MAR and Recent Results was reviewed with the receiving nurse. Opportunity for questions and clarification was provided.       Patient transported with:   Registered Nurse

## 2020-09-08 NOTE — ANESTHESIA PREPROCEDURE EVALUATION
Relevant Problems   No relevant active problems       Anesthetic History   No history of anesthetic complications            Review of Systems / Medical History  Patient summary reviewed, nursing notes reviewed and pertinent labs reviewed    Pulmonary  Within defined limits                 Neuro/Psych   Within defined limits  seizures         Cardiovascular  Within defined limits  Hypertension      CHF: NYHA Classification III  Dysrhythmias   Pacemaker, past MI, CAD and cardiac stents    Exercise tolerance: <4 METS  Comments: Ef 20%   GI/Hepatic/Renal  Within defined limits       Renal disease: ESRD and dialysis       Endo/Other  Within defined limits           Other Findings              Physical Exam    Airway  Mallampati: II  TM Distance: 4 - 6 cm  Neck ROM: normal range of motion   Mouth opening: Normal     Cardiovascular  Regular rate and rhythm,  S1 and S2 normal,  no murmur, click, rub, or gallop             Dental    Dentition: Poor dentition     Pulmonary  Breath sounds clear to auscultation               Abdominal  GI exam deferred       Other Findings            Anesthetic Plan    ASA: 4  Anesthesia type: regional - supraclavicular block          Induction: Intravenous  Anesthetic plan and risks discussed with: Patient

## 2020-09-08 NOTE — OP NOTES
[address]  OPERATIVE REPORT    Name:  Juan Gupta  MR#:  [id number]  :  1975  ACCOUNT #:  [de-identified]  DATE OF SERVICE:  2020    PREOPERATIVE DIAGNOSIS:  End-stage renal disease. POSTOPERATIVE DIAGNOSIS:  End-stage renal disease. PROCEDURE PERFORMED:  Creation of left arm arteriovenous graft. SURGEON:  Kirby Pedraza MD    ASSISTANT:  None. ANESTHESIA:  Block. COMPLICATIONS:  None. SPECIMENS REMOVED:  None. IMPLANTS:  7-mm [place] graft. ESTIMATED BLOOD LOSS:  50 mL. DRAINS:  None. INDICATIONS:  The patient is a 72-year-old male with multiple medical problems including end-stage renal disease. He requires permanent dialysis access. He has a left-sided ICD, but preoperative venography shows a widely patent left axillary subclavian and innominate veins. The patient has no superficial veins for fistula creation. He presents for left arm graft insertion. PROCEDURE:  After informed consent was obtained, the patient was given intravenous vancomycin within one hour of the incision due to history of MRSA infection. He was taken to the operating room with a satisfactory left upper extremity block in place. After establishing adequate sedation, the left arm was prepped and draped as a sterile field. Through an incision on the proximal medial left upper arm, the brachial artery and brachial vein were dissected free. These were both adequate vessels, but the vein was lateral to the artery. Both were encircled with vessel loops. The patient was given an Angiomax bolus and infusion due to a history of heparin-induced thrombocytopenia. An end-to-side anastomosis was created between a 7-mm stretch [place] graft and the vein using running 5-0 [place] suture. The graft was then tunneled in a subdermal plane in a loop configuration on the anterior surface of the upper arm using three small counter incisions to assist with tunneling. The graft was oriented such that the arterial side is lateral and the venous side is medial.  An end-to-side anastomosis was then done between the graft and the brachial artery using a running 5-0 [place]-Ryan suture. The arterial anastomosis is medial to the venous anastomosis. There was a good palpable thrill in the graft on completion. There was an adequate Doppler signal in the radial artery upon completion. The wounds were irrigated with antibiotic irrigation and were hemostatic. The wounds were closed with 3-0 Vicryl suture and skin staples. Dry dressings were applied. The patient was transferred to the PACU in stable condition. All counts were correct.         Nimisha Golden MD      [id number]  D:  09/08/2020 9:58  T:  09/08/2020 13:53  JOB #:  [id number]

## 2020-09-09 LAB
BACTERIA SPEC CULT: NORMAL
BACTERIA SPEC CULT: NORMAL
SERVICE CMNT-IMP: NORMAL

## 2020-09-10 ENCOUNTER — APPOINTMENT (OUTPATIENT)
Dept: VASCULAR SURGERY | Age: 45
End: 2020-09-10
Attending: EMERGENCY MEDICINE
Payer: MEDICARE

## 2020-09-10 ENCOUNTER — HOSPITAL ENCOUNTER (OUTPATIENT)
Age: 45
Setting detail: OBSERVATION
Discharge: HOME | End: 2020-09-11
Attending: EMERGENCY MEDICINE | Admitting: HOSPITALIST
Payer: MEDICARE

## 2020-09-10 ENCOUNTER — APPOINTMENT (OUTPATIENT)
Dept: GENERAL RADIOLOGY | Age: 45
End: 2020-09-10
Attending: EMERGENCY MEDICINE
Payer: MEDICARE

## 2020-09-10 ENCOUNTER — APPOINTMENT (OUTPATIENT)
Dept: INTERVENTIONAL RADIOLOGY/VASCULAR | Age: 45
End: 2020-09-10
Attending: EMERGENCY MEDICINE
Payer: MEDICARE

## 2020-09-10 DIAGNOSIS — E87.5 ACUTE HYPERKALEMIA: Primary | ICD-10-CM

## 2020-09-10 PROBLEM — N18.6 ESRD (END STAGE RENAL DISEASE) (HCC): Status: ACTIVE | Noted: 2020-09-10

## 2020-09-10 PROBLEM — R94.31 ABNORMAL EKG: Status: ACTIVE | Noted: 2020-09-10

## 2020-09-10 LAB
ALBUMIN SERPL-MCNC: 3.5 G/DL (ref 3.5–5)
ALBUMIN/GLOB SERPL: 0.8 {RATIO} (ref 1.1–2.2)
ALP SERPL-CCNC: 225 U/L (ref 45–117)
ALT SERPL-CCNC: 15 U/L (ref 12–78)
ANION GAP SERPL CALC-SCNC: 13 MMOL/L (ref 5–15)
AST SERPL-CCNC: 22 U/L (ref 15–37)
BASOPHILS # BLD: 0.1 K/UL (ref 0–0.1)
BASOPHILS NFR BLD: 1 % (ref 0–1)
BILIRUB SERPL-MCNC: 1 MG/DL (ref 0.2–1)
BUN SERPL-MCNC: 105 MG/DL (ref 6–20)
BUN/CREAT SERPL: 5 (ref 12–20)
CALCIUM SERPL-MCNC: 8.9 MG/DL (ref 8.5–10.1)
CHLORIDE SERPL-SCNC: 101 MMOL/L (ref 97–108)
CO2 SERPL-SCNC: 18 MMOL/L (ref 21–32)
CREAT SERPL-MCNC: 19.2 MG/DL (ref 0.7–1.3)
DIFFERENTIAL METHOD BLD: ABNORMAL
EOSINOPHIL # BLD: 1 K/UL (ref 0–0.4)
EOSINOPHIL NFR BLD: 9 % (ref 0–7)
ERYTHROCYTE [DISTWIDTH] IN BLOOD BY AUTOMATED COUNT: 15.9 % (ref 11.5–14.5)
GLOBULIN SER CALC-MCNC: 4.3 G/DL (ref 2–4)
GLUCOSE SERPL-MCNC: 72 MG/DL (ref 65–100)
HCT VFR BLD AUTO: 31.1 % (ref 36.6–50.3)
HGB BLD-MCNC: 10.2 G/DL (ref 12.1–17)
IMM GRANULOCYTES # BLD AUTO: 0 K/UL (ref 0–0.04)
IMM GRANULOCYTES NFR BLD AUTO: 0 % (ref 0–0.5)
LYMPHOCYTES # BLD: 1.3 K/UL (ref 0.8–3.5)
LYMPHOCYTES NFR BLD: 12 % (ref 12–49)
MCH RBC QN AUTO: 32.3 PG (ref 26–34)
MCHC RBC AUTO-ENTMCNC: 32.8 G/DL (ref 30–36.5)
MCV RBC AUTO: 98.4 FL (ref 80–99)
MONOCYTES # BLD: 0.8 K/UL (ref 0–1)
MONOCYTES NFR BLD: 7 % (ref 5–13)
NEUTS SEG # BLD: 7.5 K/UL (ref 1.8–8)
NEUTS SEG NFR BLD: 71 % (ref 32–75)
NRBC # BLD: 0 K/UL (ref 0–0.01)
NRBC BLD-RTO: 0 PER 100 WBC
PLATELET # BLD AUTO: 237 K/UL (ref 150–400)
PMV BLD AUTO: 11.5 FL (ref 8.9–12.9)
POTASSIUM SERPL-SCNC: 6.9 MMOL/L (ref 3.5–5.1)
PROT SERPL-MCNC: 7.8 G/DL (ref 6.4–8.2)
RBC # BLD AUTO: 3.16 M/UL (ref 4.1–5.7)
SODIUM SERPL-SCNC: 132 MMOL/L (ref 136–145)
WBC # BLD AUTO: 10.6 K/UL (ref 4.1–11.1)

## 2020-09-10 PROCEDURE — C1769 GUIDE WIRE: HCPCS

## 2020-09-10 PROCEDURE — 74011250636 HC RX REV CODE- 250/636: Performed by: STUDENT IN AN ORGANIZED HEALTH CARE EDUCATION/TRAINING PROGRAM

## 2020-09-10 PROCEDURE — 99218 HC RM OBSERVATION: CPT

## 2020-09-10 PROCEDURE — 85025 COMPLETE CBC W/AUTO DIFF WBC: CPT

## 2020-09-10 PROCEDURE — 74011250637 HC RX REV CODE- 250/637: Performed by: EMERGENCY MEDICINE

## 2020-09-10 PROCEDURE — 94640 AIRWAY INHALATION TREATMENT: CPT

## 2020-09-10 PROCEDURE — 96375 TX/PRO/DX INJ NEW DRUG ADDON: CPT

## 2020-09-10 PROCEDURE — 71045 X-RAY EXAM CHEST 1 VIEW: CPT

## 2020-09-10 PROCEDURE — 90935 HEMODIALYSIS ONE EVALUATION: CPT

## 2020-09-10 PROCEDURE — C1750 CATH, HEMODIALYSIS,LONG-TERM: HCPCS

## 2020-09-10 PROCEDURE — 96374 THER/PROPH/DIAG INJ IV PUSH: CPT

## 2020-09-10 PROCEDURE — 74011250637 HC RX REV CODE- 250/637: Performed by: INTERNAL MEDICINE

## 2020-09-10 PROCEDURE — 99285 EMERGENCY DEPT VISIT HI MDM: CPT

## 2020-09-10 PROCEDURE — 74011636637 HC RX REV CODE- 636/637: Performed by: EMERGENCY MEDICINE

## 2020-09-10 PROCEDURE — 74011250637 HC RX REV CODE- 250/637: Performed by: HOSPITALIST

## 2020-09-10 PROCEDURE — 74011000250 HC RX REV CODE- 250: Performed by: EMERGENCY MEDICINE

## 2020-09-10 PROCEDURE — 36415 COLL VENOUS BLD VENIPUNCTURE: CPT

## 2020-09-10 PROCEDURE — 94761 N-INVAS EAR/PLS OXIMETRY MLT: CPT

## 2020-09-10 PROCEDURE — 80053 COMPREHEN METABOLIC PANEL: CPT

## 2020-09-10 PROCEDURE — 77001 FLUOROGUIDE FOR VEIN DEVICE: CPT

## 2020-09-10 PROCEDURE — 87340 HEPATITIS B SURFACE AG IA: CPT

## 2020-09-10 PROCEDURE — 93971 EXTREMITY STUDY: CPT

## 2020-09-10 PROCEDURE — 77030031139 HC SUT VCRL2 J&J -A

## 2020-09-10 PROCEDURE — 93005 ELECTROCARDIOGRAM TRACING: CPT

## 2020-09-10 PROCEDURE — 74011000250 HC RX REV CODE- 250: Performed by: STUDENT IN AN ORGANIZED HEALTH CARE EDUCATION/TRAINING PROGRAM

## 2020-09-10 RX ORDER — METOPROLOL SUCCINATE 25 MG/1
12.5 TABLET, EXTENDED RELEASE ORAL
Status: DISCONTINUED | OUTPATIENT
Start: 2020-09-10 | End: 2020-09-11 | Stop reason: HOSPADM

## 2020-09-10 RX ORDER — CALCITRIOL 0.25 UG/1
0.5 CAPSULE ORAL DAILY
Status: DISCONTINUED | OUTPATIENT
Start: 2020-09-10 | End: 2020-09-11 | Stop reason: HOSPADM

## 2020-09-10 RX ORDER — POLYETHYLENE GLYCOL 3350 17 G/17G
17 POWDER, FOR SOLUTION ORAL DAILY PRN
Status: DISCONTINUED | OUTPATIENT
Start: 2020-09-10 | End: 2020-09-11 | Stop reason: HOSPADM

## 2020-09-10 RX ORDER — ACETAMINOPHEN 325 MG/1
650 TABLET ORAL
Status: DISCONTINUED | OUTPATIENT
Start: 2020-09-10 | End: 2020-09-11 | Stop reason: HOSPADM

## 2020-09-10 RX ORDER — ONDANSETRON 2 MG/ML
4 INJECTION INTRAMUSCULAR; INTRAVENOUS
Status: DISCONTINUED | OUTPATIENT
Start: 2020-09-10 | End: 2020-09-11 | Stop reason: HOSPADM

## 2020-09-10 RX ORDER — SODIUM CHLORIDE 0.9 % (FLUSH) 0.9 %
5-40 SYRINGE (ML) INJECTION EVERY 8 HOURS
Status: DISCONTINUED | OUTPATIENT
Start: 2020-09-10 | End: 2020-09-11 | Stop reason: HOSPADM

## 2020-09-10 RX ORDER — LIDOCAINE HYDROCHLORIDE 20 MG/ML
20 INJECTION, SOLUTION INFILTRATION; PERINEURAL ONCE
Status: DISPENSED | OUTPATIENT
Start: 2020-09-10 | End: 2020-09-11

## 2020-09-10 RX ORDER — PANTOPRAZOLE SODIUM 40 MG/1
40 TABLET, DELAYED RELEASE ORAL
Status: DISCONTINUED | OUTPATIENT
Start: 2020-09-11 | End: 2020-09-11 | Stop reason: HOSPADM

## 2020-09-10 RX ORDER — SODIUM CHLORIDE 0.9 % (FLUSH) 0.9 %
5-40 SYRINGE (ML) INJECTION AS NEEDED
Status: DISCONTINUED | OUTPATIENT
Start: 2020-09-10 | End: 2020-09-11 | Stop reason: HOSPADM

## 2020-09-10 RX ORDER — SODIUM POLYSTYRENE SULFONATE 15 G/60ML
45 SUSPENSION ORAL; RECTAL
Status: DISPENSED | OUTPATIENT
Start: 2020-09-10 | End: 2020-09-11

## 2020-09-10 RX ORDER — HYDROCODONE BITARTRATE AND ACETAMINOPHEN 5; 325 MG/1; MG/1
1 TABLET ORAL
Status: DISCONTINUED | OUTPATIENT
Start: 2020-09-10 | End: 2020-09-11 | Stop reason: HOSPADM

## 2020-09-10 RX ORDER — IPRATROPIUM BROMIDE AND ALBUTEROL SULFATE 2.5; .5 MG/3ML; MG/3ML
3 SOLUTION RESPIRATORY (INHALATION)
Status: COMPLETED | OUTPATIENT
Start: 2020-09-10 | End: 2020-09-10

## 2020-09-10 RX ORDER — GUAIFENESIN 100 MG/5ML
81 LIQUID (ML) ORAL DAILY
Status: DISCONTINUED | OUTPATIENT
Start: 2020-09-11 | End: 2020-09-10

## 2020-09-10 RX ORDER — WATER FOR INJECTION,STERILE
VIAL (ML) INJECTION
Status: DISPENSED
Start: 2020-09-10 | End: 2020-09-11

## 2020-09-10 RX ORDER — DEXTROSE 50 % IN WATER (D50W) INTRAVENOUS SYRINGE
25
Status: COMPLETED | OUTPATIENT
Start: 2020-09-10 | End: 2020-09-10

## 2020-09-10 RX ORDER — OXYCODONE AND ACETAMINOPHEN 5; 325 MG/1; MG/1
2 TABLET ORAL
Status: COMPLETED | OUTPATIENT
Start: 2020-09-10 | End: 2020-09-10

## 2020-09-10 RX ORDER — SEVELAMER CARBONATE 800 MG/1
1600 TABLET, FILM COATED ORAL
Status: DISCONTINUED | OUTPATIENT
Start: 2020-09-10 | End: 2020-09-11 | Stop reason: HOSPADM

## 2020-09-10 RX ORDER — SODIUM BICARBONATE 1 MEQ/ML
50 SYRINGE (ML) INTRAVENOUS
Status: COMPLETED | OUTPATIENT
Start: 2020-09-10 | End: 2020-09-10

## 2020-09-10 RX ORDER — MORPHINE SULFATE 2 MG/ML
1 INJECTION, SOLUTION INTRAMUSCULAR; INTRAVENOUS ONCE
Status: ACTIVE | OUTPATIENT
Start: 2020-09-11 | End: 2020-09-11

## 2020-09-10 RX ORDER — ASPIRIN 81 MG/1
81 TABLET ORAL DAILY
Status: DISCONTINUED | OUTPATIENT
Start: 2020-09-11 | End: 2020-09-11 | Stop reason: HOSPADM

## 2020-09-10 RX ORDER — CEFAZOLIN SODIUM 1 G/3ML
INJECTION, POWDER, FOR SOLUTION INTRAMUSCULAR; INTRAVENOUS
Status: DISPENSED
Start: 2020-09-10 | End: 2020-09-11

## 2020-09-10 RX ORDER — ONDANSETRON 2 MG/ML
4 INJECTION INTRAMUSCULAR; INTRAVENOUS
Status: DISCONTINUED | OUTPATIENT
Start: 2020-09-10 | End: 2020-09-10

## 2020-09-10 RX ORDER — METOPROLOL SUCCINATE 25 MG/1
12.5 TABLET, EXTENDED RELEASE ORAL DAILY
Status: DISCONTINUED | OUTPATIENT
Start: 2020-09-11 | End: 2020-09-10

## 2020-09-10 RX ORDER — LIDOCAINE HYDROCHLORIDE 20 MG/ML
18 INJECTION, SOLUTION INFILTRATION; PERINEURAL ONCE
Status: COMPLETED | OUTPATIENT
Start: 2020-09-10 | End: 2020-09-10

## 2020-09-10 RX ADMIN — DEXTROSE MONOHYDRATE 25 G: 25 INJECTION, SOLUTION INTRAVENOUS at 18:50

## 2020-09-10 RX ADMIN — IPRATROPIUM BROMIDE AND ALBUTEROL SULFATE 3 ML: .5; 3 SOLUTION RESPIRATORY (INHALATION) at 17:54

## 2020-09-10 RX ADMIN — OXYCODONE HYDROCHLORIDE AND ACETAMINOPHEN 2 TABLET: 5; 325 TABLET ORAL at 18:33

## 2020-09-10 RX ADMIN — HYDROCODONE BITARTRATE AND ACETAMINOPHEN 1 TABLET: 5; 325 TABLET ORAL at 23:38

## 2020-09-10 RX ADMIN — SODIUM BICARBONATE 50 MEQ: 84 INJECTION, SOLUTION INTRAVENOUS at 18:50

## 2020-09-10 RX ADMIN — SEVELAMER CARBONATE 1600 MG: 800 TABLET, FILM COATED ORAL at 20:16

## 2020-09-10 RX ADMIN — LIDOCAINE HYDROCHLORIDE 360 MG: 20 INJECTION, SOLUTION INFILTRATION; PERINEURAL at 17:05

## 2020-09-10 RX ADMIN — METOPROLOL SUCCINATE 12.5 MG: 25 TABLET, EXTENDED RELEASE ORAL at 23:52

## 2020-09-10 RX ADMIN — HUMAN INSULIN 10 UNITS: 100 INJECTION, SOLUTION SUBCUTANEOUS at 18:50

## 2020-09-10 RX ADMIN — ACETAMINOPHEN 650 MG: 325 TABLET ORAL at 22:31

## 2020-09-10 RX ADMIN — WATER 2 G: 1 INJECTION INTRAMUSCULAR; INTRAVENOUS; SUBCUTANEOUS at 17:11

## 2020-09-10 NOTE — ED NOTES
TRANSFER - OUT REPORT:    Verbal report given to Guru Cherry (name) on Eze York  being transferred to Oncology(unit) for routine progression of care       Report consisted of patients Situation, Background, Assessment and   Recommendations(SBAR). Information from the following report(s) SBAR, Kardex, ED Summary, [facility] and Recent Results was reviewed with the receiving nurse. Lines:       Opportunity for questions and clarification was provided.

## 2020-09-10 NOTE — PROGRESS NOTES
Dictated    Will need emergent hd and I have contacted razia and placed orders on chart    If hd is ready imminently could defer kayexalate , o/w treat with the insulin d50 nebs calcium.     thanks

## 2020-09-10 NOTE — ED NOTES
Patient with minimal access for line placement and med administration, even after IR placement of dialysis catheter. Charge RN to look for line at this time. If unable to get line, RN to administer PO medications. If line established, RN to administer IV medications per MD. Brian Beckham nephrology evaluation.

## 2020-09-10 NOTE — ED PROVIDER NOTES
EMERGENCY DEPARTMENT HISTORY AND PHYSICAL EXAM      Date: 9/10/2020  Patient Name: Radha Santiago    History of Presenting Illness     Chief Complaint   Patient presents with   [employer] Vascular Access Problem     Pt comes with newly placed graft in his left arm bleeding today with swelling. applied pressure with coban       History Provided By: Patient    HPI: Radha Santiago, 45 y.o. male with a past medical history significant for history of end-stage renal disease on dialysis (home dialysis x5 days a week), history of nephrotic syndrome, history of renal transplant, history of cardiac disease status post AICD placement presents to the ED with cc of persistent bleeding and swelling to the site of his new AV fistula site. Patient was seen by Dr. Sonali Jacobo at [facility] and had a new AV fistula placed approximately a week ago. He is currently doing his dialysis through his right femoral dialysis catheter that is external.  This does not work for the last 48 hours. Today he tried to change his dressing on the left upper arm and found that there was excessive bleeding from the site. He applied bandages came to the ER. He otherwise denies any chest pain, trouble breathing, altered mental status, headaches, visual changes, or any other associated symptoms. The only thing he does report some mild lower extremity pitting edema. His nephrologist and other specialists are all at [facility]. No other exacerbating or milling factors. There are no other complaints, changes, or physical findings at this time. PCP: Zahra Batista MD    No current facility-administered medications on file prior to encounter. Current Outpatient Medications on File Prior to Encounter   Medication Sig Dispense Refill    HYDROcodone-acetaminophen (Norco) 5-325 mg per tablet Take 1 Tab by mouth every four (4) hours as needed for Pain for up to 5 days. Max Daily Amount: 6 Tabs.  25 Tab 0    ondansetron (ZOFRAN ODT) 4 mg disintegrating tablet Take 1 Tab by mouth every eight (8) hours as needed. 20 Tab 0    acetaminophen (TYLENOL) 500 mg tablet Take 1 Tab by mouth every four (4) hours as needed for Pain. Over the counter 30 Tab 0    sevelamer carbonate (RENVELA) 800 mg tab tab Take 1,600 mg by mouth three (3) times daily (with meals). Only takes if eating a  meal      warfarin (COUMADIN) 2.5 mg tablet Take 2.5 mg by mouth daily. Every day except Monday & wednesday      warfarin (COUMADIN) 5 mg tablet Take 5 mg by mouth five (5) days a week. Monday & wednesday      metoprolol succinate (TOPROL-XL) 25 mg XL tablet Take 12.5 mg by mouth daily. am      omeprazole (PRILOSEC) 20 mg capsule Take 20 mg by mouth daily.  calcitRIOL (ROCALTROL) 0.5 mcg capsule Take 0.5 mcg by mouth daily.  aspirin 81 mg chewable tablet Take 81 mg by mouth daily. Past History     Past Medical History:  Past Medical History:   Diagnosis Date    Abdominal hematoma     AICD (automatic cardioverter/defibrillator) present     CAD (coronary artery disease)     anterior MI s/p 2 stents  2007    Chronic abdominal pain     Chronic kidney disease     ESRD; Dialysis dependent.  Home [facility] does labs- [facility] tpke    DVT (deep venous thrombosis) ([address] 2001    DVT of popliteal vein ([address] 11/2011    not anticoagulated due to bleeding, IVC filter    Endocarditis     Gallstone pancreatitis     Gastrointestinal disorder     acid reflux    Gastrointestinal disorder     peptic ulcer    Hemodialysis patient ([address]     High cholesterol     Hypertension     Kidney transplant     b/l kidneys 1995, 2001    Long term current use of anticoagulant therapy     Nephrotic syndrome     Other ill-defined conditions(799.89)     kidny transplant x2,  on dialysis    Other ill-defined conditions(799.89)     home dialysis    Other ill-defined conditions(799.89)     hx recurrent left leg DVT    Peritonitis ([address]     Seizures ([address] 2015    most recent- only had three in lifetime    Small bowel obstruction (HCC)     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    V-[facility]ach [facility])        Past Surgical History:  Past Surgical History:   Procedure Laterality Date    CARDIAC SURG PROCEDURE UNLIST  2007    2 stents    HX APPENDECTOMY      HX CHOLECYSTECTOMY      HX OTHER SURGICAL      cholecystectomy    HX OTHER SURGICAL  11/2011    exploratory laparotomy    HX OTHER SURGICAL      fem-pop    HX OTHER SURGICAL  02/2013    right upper extremity fistula    HX PACEMAKER Left 6/2009    AICD-st latonya-not connected    HX PACEMAKER Left     AICD-left side-BS-working    HX SMALL BOWEL RESECTION      HX TRANSPLANT  2001     Kidney    HX TRANSPLANT  1992    kidney    HX UROLOGICAL      2 kidney transplants    HX VASCULAR ACCESS Right     femoral access for HD- does 5 day dialysis @ home    [place] EDG US EXAM SURGICAL ALTER STOM DUODENUM/JEJUNUM  7/15/2011         [place] ESOPHAGOGASTRODUODENOSCOPY TRANSORAL DIAGNOSTIC  5/24/2012         VASCULAR SURGERY PROCEDURE UNLIST  11/14/2017    Insertion AV graft right upper arm (bovine); ligation of AV fistula right arm       Family History:  Family History   Problem Relation Age of Onset    COPD Mother     Lung Disease Mother     Cancer Father         stomach    Cancer Maternal Grandmother        Social History:  Social History     Tobacco Use    Smoking status: Never Smoker    Smokeless tobacco: Never Used   Substance Use Topics    Alcohol use: No    Drug use: Yes     Types: Prescription, OTC       Allergies: Allergies   Allergen Reactions    Shellfish Containing Products Swelling     Break out in rash, trouble breathing    Heparin Analogues Other (comments)     Positive history of HIT    Iodinated Contrast Media Other (comments)     Because of renal disease.   No rash or hives per patient report 1/14/2012         Review of Systems   Review of Systems   Constitutional: Negative for chills, diaphoresis, fatigue and fever. HENT: Negative for ear pain and sore throat. Eyes: Negative for pain and redness. Respiratory: Negative for cough and shortness of breath. Cardiovascular: Positive for leg swelling. Negative for chest pain. Gastrointestinal: Negative for abdominal pain, diarrhea, nausea and vomiting. Endocrine: Negative for cold intolerance and heat intolerance. Genitourinary: Negative for flank pain and hematuria. Musculoskeletal: Negative for back pain and neck stiffness. Skin: Negative for rash and wound. Neurological: Negative for dizziness, syncope and headaches. All other systems reviewed and are negative. Physical Exam   Physical Exam  Vitals signs and nursing note reviewed. Constitutional:       Appearance: He is well-developed. HENT:      Head: Normocephalic and atraumatic. Mouth/Throat:      Pharynx: No oropharyngeal exudate. Eyes:      Conjunctiva/sclera: Conjunctivae normal.      Pupils: Pupils are equal, round, and reactive to light. Neck:      Musculoskeletal: Normal range of motion. Cardiovascular:      Rate and Rhythm: Normal rate and regular rhythm. Heart sounds: No murmur. Pulmonary:      Effort: Pulmonary effort is normal. No respiratory distress. Breath sounds: Normal breath sounds. No wheezing. Abdominal:      General: Bowel sounds are normal. There is no distension. Palpations: Abdomen is soft. Tenderness: There is no abdominal tenderness. Musculoskeletal: Normal range of motion. General: No deformity. Right lower leg: Edema present. Left lower leg: Edema present. Comments: Left upper arm: Please see picture below. Patient has a bandage in place along the medial aspect of the left upper arm at the site of the new AV fistula site. There is a palpable bruit. There is diffuse nonpitting 3+ edema in the left upper extremity. There is good distal radial and ulnar pulses.   There is some mild warmth to the entire left upper extremity but no signs of fluctuance or abscess. There is no loss of sensation or motor function. Bilateral lower extremities: 2+ pitting edema bilaterally lower extremities   Skin:     General: Skin is warm and dry. Findings: No rash. Neurological:      Mental Status: He is alert and oriented to person, place, and time. Coordination: Coordination normal.   Psychiatric:         Behavior: Behavior norm    al.         Diagnostic Study Results     Labs -     Recent Results (from the past 24 hour(s))   CBC WITH AUTOMATED DIFF    Collection Time: 09/10/20  2:57 PM   Result Value Ref Range    WBC 10.6 4.1 - 11.1 K/uL    RBC 3.16 (L) 4.10 - 5.70 M/uL    HGB 10.2 (L) 12.1 - 17.0 g/dL    HCT 31.1 (L) 36.6 - 50.3 %    MCV 98.4 80.0 - 99.0 FL    MCH 32.3 26.0 - 34.0 PG    MCHC 32.8 30.0 - 36.5 g/dL    RDW 15.9 (H) 11.5 - 14.5 %    PLATELET 583 994 - 324 K/uL    MPV 11.5 8.9 - 12.9 FL    NRBC 0.0 0  WBC    ABSOLUTE NRBC 0.00 0.00 - 0.01 K/uL    NEUTROPHILS 71 32 - 75 %    LYMPHOCYTES 12 12 - 49 %    MONOCYTES 7 5 - 13 %    EOSINOPHILS 9 (H) 0 - 7 %    BASOPHILS 1 0 - 1 %    IMMATURE GRANULOCYTES 0 0.0 - 0.5 %    ABS. NEUTROPHILS 7.5 1.8 - 8.0 K/UL    ABS. LYMPHOCYTES 1.3 0.8 - 3.5 K/UL    ABS. MONOCYTES 0.8 0.0 - 1.0 K/UL    ABS. EOSINOPHILS 1.0 (H) 0.0 - 0.4 K/UL    ABS. BASOPHILS 0.1 0.0 - 0.1 K/UL    ABS. IMM.  GRANS. 0.0 0.00 - 0.04 K/UL    DF AUTOMATED     METABOLIC PANEL, COMPREHENSIVE    Collection Time: 09/10/20  2:57 PM   Result Value Ref Range    Sodium 132 (L) 136 - 145 mmol/L    Potassium 6.9 (HH) 3.5 - 5.1 mmol/L    Chloride 101 97 - 108 mmol/L    CO2 18 (L) 21 - 32 mmol/L    Anion gap 13 5 - 15 mmol/L    Glucose 72 65 - 100 mg/dL     (H) 6 - 20 MG/DL    Creatinine 19.20 (H) 0.70 - 1.30 MG/DL    BUN/Creatinine ratio 5 (L) 12 - 20      GFR est AA 3 (L) >60 ml/min/1.73m2    GFR est non-AA 3 (L) >60 ml/min/1.73m2    Calcium 8.9 8.5 - 10.1 MG/DL Bilirubin, total 1.0 0.2 - 1.0 MG/DL    ALT (SGPT) 15 12 - 78 U/L    AST (SGOT) 22 15 - 37 U/L    Alk. phosphatase 225 (H) 45 - 117 U/L    Protein, total 7.8 6.4 - 8.2 g/dL    Albumin 3.5 3.5 - 5.0 g/dL    Globulin 4.3 (H) 2.0 - 4.0 g/dL    A-G Ratio 0.8 (L) 1.1 - 2.2         Radiologic Studies -   XR CHEST PORT   Final Result   IMPRESSION: No acute findings. The dialysis catheter tip overlies the superior   vena cava. IR REPLACE CVC TUNNELED W/O PORT    (Results Pending)     CT Results  (Last 48 hours)    None        CXR Results  (Last 48 hours)               09/10/20 1402  XR CHEST PORT Final result    Impression:  IMPRESSION: No acute findings. The dialysis catheter tip overlies the superior   vena cava. Narrative:  EXAM: XR CHEST PORT       INDICATION: missed dialysis       COMPARISON: 2019       FINDINGS: A portable AP radiograph of the chest was obtained at 1358 hours. The   defibrillator is seen in place. A femoral dialysis catheter tip is in the region   of the superior vena cava. The patient is on a cardiac monitor. A catheter   overlies the spinal column. The lungs are clear. The cardiac and mediastinal   contours and pulmonary vascularity are normal.  The bones and soft tissues are   grossly within normal limits. There are stents in the right axilla and right   upper extremity. Medical Decision Making   I am the first provider for this patient. I reviewed the vital signs, available nursing notes, past medical history, past surgical history, family history and social history. Vital Signs-Reviewed the patient's vital signs.   Patient Vitals for the past 12 hrs:   Temp Pulse Resp BP SpO2   09/10/20 1715  98 16  100 %   09/10/20 1700  98 17  100 %   09/10/20 1630  93 18 153/82 100 %   09/10/20 1615  94 15 (!) 154/93 100 %   09/10/20 1430    (!) 126/91 100 %   09/10/20 1400    140/90 100 %   09/10/20 1345    (!) 144/96 100 %   09/10/20 1330    (!) 135/92 100 %   09/10/20 1315 98.7 °F (37.1 °C) (!) 112 18 144/81 100 %       Records Reviewed: Nursing records and medical records reviewed    MDM:  DVT versus vascular access issue versus cellulitis    Provider Notes (Medical Decision Making):   Patient is a 53-year-old male presenting with lack of access for dialysis and bleeding from his new AV fistula on his left arm. Bleeding was controlled direct pressure and patient was seen by Dr. Halima Stevenson his vascular surgeon and cleared. However, patient found of hyperkalemia with acute T waves abnormalities. Will treat with standard cocktail of insulin, glucose, bicarb, Kayexalate. Discussed with Dr. Cade Courser with nephrology who suggest admission and treatment as stated above. At this time we did not arrange with IR to have a new right femoral catheter placed so he can have successful dialysis and dialysis will be arranged by Dr. Helder López. ED Course:   Initial assessment performed. The patients presenting problems have been discussed, and they are in agreement with the care plan formulated and outlined with them. I have encouraged them to ask questions as they arise throughout their visit. ED Course as of Sep 10 1743   Thu Sep 10, 2020   1441 Discussed patient's dialysis access in his right femoral region. This catheter was not placed here. They use TPA to flush the catheter and it flushes fine. The issue is likely that this catheter needs to be replaced but it was not done here.     [CC]      ED Course User Index  [CC] Raisa Askew MD               Critical Care:  I have spent 35 minutes of critical care time in evaluating and treating this patient. This includes time spent at bedside, time with family and decision makers, documentation, review of labs and imaging, and/or consultation with specialists. It does not include time spent on separately billed procedures.      This patient presents with a critical illness or injury that acutely impairs one or more vital organ systems such that there is a high probability of imminent or life threatening deterioration in the patient's condition. This case involved decision making of high complexity to assess, manipulate, and support vital organ system failure and/or to prevent further life threatening deterioration of the patient's condition. Failure to initiate these interventions on an urgent basis would likely result in sudden, clinically significant or life threatening deterioration in the patient's condition. Abnormal findings supporting critical care: Abnormal EKG changes with hyperkalemia  Interventions to support critical care: Insulin, glucose, bicarbonate, albuterol  Failure to intervene may result in: Cardiac dysrhythmia and/or arrest        Disposition:  Admit Note:  5:44 PM  Pt is being admitted by Dr. Dale Evans. The results of their tests and reason(s) for their admission have been discussed with pt and/or available family. They convey agreement and understanding for the need to be admitted and for admission diagnosis. DISCHARGE PLAN:  1. Current Discharge Medication List        2. Follow-up Information    None       3. Return to ED if worse     Diagnosis     Clinical Impression:   1. Acute hyperkalemia        Attestations:    Blade Pratt MD    Please note that this dictation was completed with [employer], the computer voice recognition software. Quite often unanticipated grammatical, syntax, homophones, and other interpretive errors are inadvertently transcribed by the computer software. Please disregard these errors. Please excuse any errors that have escaped final proofreading. Thank you.

## 2020-09-10 NOTE — H&P
Hospitalist Admission Note    NAME: Kalee Lomax   :  1975   MRN:  [id number]     Date/Time:  9/10/2020 5:45 PM    Patient PCP: Luis Daniel Pierre MD  ______________________________________________________________________   Assessment & Plan:  Severe hyperkalemia 6.9 with peak T waves, POA  Hypervolemic hyponatremia Na 132, POA  --give sodium bicarb, calcium gluconate, insulin IV; ok to use right femoral dialysis line for this if cannot get peripheral IV access in timely manner  --kayexalate ordered by ER but okay to hold if dialysis will occur in next 30 minutes  --emergent dialysis ordered by nephrology appreciated  --observation status  --repeat labs in AM    ESRD on home HD 5x/week  Nonfunctioning right femoral dialysis line, POA. Replaced by IR today  Bleeding from left upper arm dialysis fistula after fistula placement 4 days ago  --no dialysis since  per patient. Renal consult appreciated  --right femoral dialysis line replaced by IR today  --pressure applied to left arm 45 minutes ago and dressing remains clear. Vascular surgery seen in ER at bedside per patient. --continue sevelamer and calcitriol    Hx recurrent left leg DVT on coumadin, last dose   --check INR. Hold coumadin for now given bleeding. If no bleeding overnight, restart coumadin tomorrow    CAD with hx MI  ICMO EF 40%   Hx Vtach s/p AICD  --continue aspirin 81mg daily  --continue toprol XL 12.5mg daily    Hx HIT  --no heparin/lovenox blood product    Body mass index is 24.17 kg/m².     Code: full  DVT prophylaxis: SCD  Surrogate decision maker:  Sister Elver Green        Subjective:   CHIEF COMPLAINT:  Bleeding in left arm from recent dialysis fistula    HISTORY OF PRESENT ILLNESS:     Kalee Lomax is a 45 y.o. male has medical history of end-stage renal disease on home dialysis 5 times per day, followed by [facility] nephrologist Dr. Sydni Crespo, history of coronary artery disease and MI, ischemic cardiomyopathy EF of 40% in 2018, history of recurrent left leg DVT on Coumadin who presents to the emergency room with sudden bleeding from recent fistula that was placed about 4 days ago. He is on Coumadin but last took it last Wednesday and was supposed to restart Coumadin today. He last took aspirin yesterday. He denies any trauma to cause the bleed. He has a right femoral dialysis catheter which has not been working well for dialysis and as a result has not had dialysis since September 7. He denies any dizziness, lightheadedness, chest pain, shortness of breath. There has been some swelling in the left arm following the surgery, denies any redness or pain. In the ER, bleeding was stopped with pressure dressing. However blood work shows potassium of 6.9 with peaked T waves. Right femoral dialysis line was replaced by IR and admission was recommended by nephrology given the EKG changes. We were asked to admit for work up and evaluation of the above problems. Past Medical History:   Diagnosis Date    Abdominal hematoma     AICD (automatic cardioverter/defibrillator) present     CAD (coronary artery disease)     anterior MI s/p 2 stents  2007    Chronic abdominal pain     Chronic kidney disease     ESRD; Dialysis dependent.  Home [facility] does labs- [facility] tpke    DVT (deep venous thrombosis) ([facility] 2001    DVT of popliteal vein ([address] 11/2011    not anticoagulated due to bleeding, IVC filter    Endocarditis     Gallstone pancreatitis     Gastrointestinal disorder     acid reflux    Gastrointestinal disorder     peptic ulcer    Hemodialysis patient ([facility]     High cholesterol     Hypertension     Kidney transplant     b/l kidneys 1995, 2001    Long term current use of anticoagulant therapy     Nephrotic syndrome     Other ill-defined conditions(799.89)     kidny transplant x2,  on dialysis    Other ill-defined conditions(799.89)     home dialysis    Other ill-defined conditions(799.89)     hx recurrent left leg DVT    Peritonitis ([facility]     Seizures ([facility] 2015    most recent- only had three in lifetime    Small bowel obstruction (HCC)     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    V-tach [facility])       Past Surgical History:   Procedure Laterality Date    CARDIAC SURG PROCEDURE UNLIST  2007    2 stents    HX APPENDECTOMY      HX CHOLECYSTECTOMY      HX OTHER SURGICAL      cholecystectomy    HX OTHER SURGICAL  11/2011    exploratory laparotomy    HX OTHER SURGICAL      fem-pop    HX OTHER SURGICAL  02/2013    right upper extremity fistula    HX PACEMAKER Left 6/2009    AICD-st latonya-not connected    HX PACEMAKER Left     AICD-left side-BS-working    HX SMALL BOWEL RESECTION      HX TRANSPLANT  2001     Kidney    HX TRANSPLANT  1992    kidney    HX UROLOGICAL      2 kidney transplants    HX VASCULAR ACCESS Right     femoral access for HD- does 5 day dialysis @ home    PA [address] DUODENUM/JEJUNUM  7/15/2011         PA ESOPHAGOGASTRODUODENOSCOPY TRANSORAL DIAGNOSTIC  5/24/2012         VASCULAR SURGERY PROCEDURE UNLIST  11/14/2017    Insertion AV graft right upper arm (bovine); ligation of AV fistula right arm     Social History     Tobacco Use    Smoking status: Never Smoker    Smokeless tobacco: Never Used   Substance Use Topics    Alcohol use: No      Drug use: Denies  Lives with sister. Unemployed    Family History   Problem Relation Age of Onset    COPD Mother     Lung Disease Mother     Cancer Father         stomach    Cancer Maternal Grandmother      Allergies   Allergen Reactions    Shellfish Containing Products Swelling     Break out in rash, trouble breathing    Heparin Analogues Other (comments)     Positive history of HIT    Iodinated Contrast Media Other (comments)     Because of renal disease. No rash or hives per patient report 1/14/2012        Prior to Admission medications    Medication Sig Start Date End Date Taking? Authorizing Provider   HYDROcodone-acetaminophen (Norco) 5-325 mg per tablet Take 1 Tab by mouth every four (4) hours as needed for Pain for up to 5 days. Max Daily Amount: 6 Tabs. 9/8/20 9/13/20  Jeane Spann MD   ondansetron (ZOFRAN ODT) 4 mg disintegrating tablet Take 1 Tab by mouth every eight (8) hours as needed. 1/20/19   Lashanda Hall MD   acetaminophen (TYLENOL) 500 mg tablet Take 1 Tab by mouth every four (4) hours as needed for Pain. Over the counter 1/20/19   Lashanda Hall MD   sevelamer carbonate (RENVELA) 800 mg tab tab Take 1,600 mg by mouth three (3) times daily (with meals). Only takes if eating a  meal    Provider, Historical   warfarin (COUMADIN) 2.5 mg tablet Take 2.5 mg by mouth daily. Every day except Monday & wednesday    Provider, Historical   warfarin (COUMADIN) 5 mg tablet Take 5 mg by mouth five (5) days a week. Monday & wednesday    Provider, Historical   metoprolol succinate (TOPROL-XL) 25 mg XL tablet Take 12.5 mg by mouth daily. am    Provider, Historical   omeprazole (PRILOSEC) 20 mg capsule Take 20 mg by mouth daily. Provider, Historical   calcitRIOL (ROCALTROL) 0.5 mcg capsule Take 0.5 mcg by mouth daily. Provider, Historical   aspirin 81 mg chewable tablet Take 81 mg by mouth daily. Other, MD Morro     REVIEW OF SYSTEMS:  POSITIVE= Bold.   Negative = normal text  General:  fever, chills, sweats, generalized weakness, weight loss/gain, loss of appetite  Eyes:  blurred vision, eye pain, loss of vision, diplopia  Ear Nose and Throat:  rhinorrhea, pharyngitis  Respiratory:   cough, sputum production, SOB, wheezing, ZAMUDIO, pleuritic pain  Cardiology:  chest pain, palpitations, orthopnea, PND, edema, syncope   Gastrointestinal:  abdominal pain, N/V, dysphagia, diarrhea, constipation, bleeding  Genitourinary:  frequency, urgency, dysuria, hematuria, incontinence  Muskuloskeletal :  arthralgia, myalgia, swelling left arm  Hematology:  easy bruising, bleeding, lymphadenopathy  Dermatological:  rash, ulceration, pruritis  Endocrine:  hot flashes or polydipsia  Neurological:  headache, dizziness, confusion, focal weakness, paresthesia, memory loss, gait disturbance  Psychological: anxiety, depression, agitation      Objective:   VITALS:    Visit Vitals  /82   Pulse 98   Temp 98.7 °F (37.1 °C)   Resp 16   Ht 5' 7\" (1.702 m)   Wt 70 kg (154 lb 5.2 oz)   SpO2 100%   BMI 24.17 kg/m²     Temp (24hrs), Av.7 °F (37.1 °C), Min:98.7 °F (37.1 °C), Max:98.7 °F (37.1 °C)    Body mass index is 24.17 kg/m². PHYSICAL EXAM:    General:    Alert, cooperative, no distress, appears stated age. HEENT: Atraumatic, anicteric sclerae, pink conjunctivae     No oral ulcers, mucosa moist, throat clear. Hearing intact. Neck:  Supple, symmetrical,  thyroid: non tender  Lungs:   Clear to auscultation bilaterally. No Wheezing or Rhonchi. No rales. Chest wall:  No tenderness  No Accessory muscle use. Heart:   Regular  rhythm, mild tachycardia, no  murmur   No gallop. No edema. Abdomen:   Soft, non-tender. Not distended. Bowel sounds normal. No masses  Extremities: No cyanosis. No clubbing pressure dressing extending from left upper arm down to the wrist.  Dressing is clean without any signs of bleeding. Skin:     Not pale Not Jaundiced  No rashes   Psych:  Good insight. Not depressed. Not anxious or agitated. Neurologic: EOMs intact. No facial asymmetry. No aphasia or slurred speech. Symmetrical strength, Alert and oriented X 3. IMAGING RESULTS:   []       I have personally reviewed the actual   []     CXR  []     CT scan  CXR:  CT :  EKG: Personally reviewed. Normal sinus rhythm, peaked T waves in V2 through V3, LVH, T wave inversions laterally.    ________________________________________________________________________  Care Plan discussed with:    Comments   Patient y    Family      RN y    Care Manager                    Consultant:  y Dr. Dwyane Aase ________________________________________________________________________  Prophylaxis:  GI  PPI   DVT  SCDs   ________________________________________________________________________  Recommended Disposition:   Home with Family y   HH/PT/OT/RN    SNF/LTC    KARLI    ________________________________________________________________________  Code Status:  Full Code y   DNR/DNI    ________________________________________________________________________  TOTAL TIME:  50 minutes      Comments     Reviewed previous records   >50% of visit spent in counseling and coordination of care  Discussion with patient and/or family and questions answered         ______________________________________________________________________  Moises Ortiz MD      Procedures: see electronic medical records for all procedures/Xrays and details which were not copied into this note but were reviewed prior to creation of Plan. LAB DATA REVIEWED:    Recent Results (from the past 24 hour(s))   CBC WITH AUTOMATED DIFF    Collection Time: 09/10/20  2:57 PM   Result Value Ref Range    WBC 10.6 4.1 - 11.1 K/uL    RBC 3.16 (L) 4.10 - 5.70 M/uL    HGB 10.2 (L) 12.1 - 17.0 g/dL    HCT 31.1 (L) 36.6 - 50.3 %    MCV 98.4 80.0 - 99.0 FL    MCH 32.3 26.0 - 34.0 PG    MCHC 32.8 30.0 - 36.5 g/dL    RDW 15.9 (H) 11.5 - 14.5 %    PLATELET 821 123 - 285 K/uL    MPV 11.5 8.9 - 12.9 FL    NRBC 0.0 0  WBC    ABSOLUTE NRBC 0.00 0.00 - 0.01 K/uL    NEUTROPHILS 71 32 - 75 %    LYMPHOCYTES 12 12 - 49 %    MONOCYTES 7 5 - 13 %    EOSINOPHILS 9 (H) 0 - 7 %    BASOPHILS 1 0 - 1 %    IMMATURE GRANULOCYTES 0 0.0 - 0.5 %    ABS. NEUTROPHILS 7.5 1.8 - 8.0 K/UL    ABS. LYMPHOCYTES 1.3 0.8 - 3.5 K/UL    ABS. MONOCYTES 0.8 0.0 - 1.0 K/UL    ABS. EOSINOPHILS 1.0 (H) 0.0 - 0.4 K/UL    ABS. BASOPHILS 0.1 0.0 - 0.1 K/UL    ABS. IMM.  GRANS. 0.0 0.00 - 0.04 K/UL    DF AUTOMATED     METABOLIC PANEL, COMPREHENSIVE    Collection Time: 09/10/20  2:57 PM   Result Value Ref Range Sodium 132 (L) 136 - 145 mmol/L    Potassium 6.9 (HH) 3.5 - 5.1 mmol/L    Chloride 101 97 - 108 mmol/L    CO2 18 (L) 21 - 32 mmol/L    Anion gap 13 5 - 15 mmol/L    Glucose 72 65 - 100 mg/dL     (H) 6 - 20 MG/DL    Creatinine 19.20 (H) 0.70 - 1.30 MG/DL    BUN/Creatinine ratio 5 (L) 12 - 20      GFR est AA 3 (L) >60 ml/min/1.73m2    GFR est non-AA 3 (L) >60 ml/min/1.73m2    Calcium 8.9 8.5 - 10.1 MG/DL    Bilirubin, total 1.0 0.2 - 1.0 MG/DL    ALT (SGPT) 15 12 - 78 U/L    AST (SGOT) 22 15 - 37 U/L    Alk.  phosphatase 225 (H) 45 - 117 U/L    Protein, total 7.8 6.4 - 8.2 g/dL    Albumin 3.5 3.5 - 5.0 g/dL    Globulin 4.3 (H) 2.0 - 4.0 g/dL    A-G Ratio 0.8 (L) 1.1 - 2.2

## 2020-09-10 NOTE — ED NOTES
Pt. Very difficult stick due to multiple access sites to extremities. Staff to attempt ultrasound IV placement for blood draw.

## 2020-09-10 NOTE — ED NOTES
Pt. Presents to ED today for complaints of bleeding from a dialysis fistula in the LUE. Patient states the fistula was placed on Tuesday. Patient does home dialysis 5 days a week but has been unable to perform since Monday through his catheter in his R groin. Pt. Denies any SOB. Pt. Arm wrapped at this time to slow bleeding. Pt. Alert and oriented x4. PT. Placed in position of comfort with call bell in reach.

## 2020-09-10 NOTE — ROUTINE PROCESS
1655 - Pt in ED # 35 - pt brought over to IR department on stretcher and ambulated into procedure room and adjusted self onto table w/upright steady gait, w/o assistance, A/O x4 - pt states he has been holding Coumadin since last week. Dr. Keo Marie Present for pre procedure consult and consent - questions reviewed with understanding - consent signed. Discharge instructions reviewed, will review again at discharge - pt is familiar with this procedure and has had several groin dialysis catheters at [facility]. 65 - Dr Keo Marie advised to give 2G ancef IV for this procedure (order placed) given through dialysis port and flushed w/20cc normal saline. 1715 - pt tolerated procedure well and taken back to ER #35 w/bedside report given to ER nurse

## 2020-09-10 NOTE — CONSULTS
[facility]    Name:  Rosie Burnett  MR#:  [id number]  :  1975  ACCOUNT #:  [de-identified]  DATE OF SERVICE:  09/10/2020    REASON FOR CONSULTATION:  ESRD. HISTORY OF PRESENT ILLNESS:  This is a 82-year-old [facility] American male with following medical problems;  1. End-stage renal disease, on home hemodialysis (followed by [facility] physicians). 2.  Nephrotic syndrome. 3.  Status post renal transplant (failed). 4.  Status post AICD. 5.  Chronic anticoagulation. 6.  Coronary artery disease, status post PTCA. 7.  History of DVT. 8.  History of endocarditis. 9.  History of gallstone pancreatitis. 10.  Hypertension. 11.  Dyslipidemia. 12.  History of seizures. 13. HIT. 14.  History of ventricular tachycardia. CURRENT OUTPATIENT MEDICATIONS:  Include hydrocodone, Zofran, acetaminophen, sevelamer, Warfarin, metoprolol 12.5 mg in the morning, omeprazole 20 mg daily, Rocaltrol 0.5 mcg daily, aspirin 81 mg daily. I was asked to see the patient regarding end-stage renal disease. This is a 82[facility] who is followed by the physicians in the Renal Division at the [facility]. He dialyses at home. His last dialysis was Monday (three days ago). He presented to the emergency room with a dysfunctional femoral dialysis catheter, was found to be quite hyperkalemic, serum potassium measured at 6.9 mEq/L. He has been seen by the interventionist and told that a functional dialysis catheter now has been placed. I have spoken with the [facility] dialysis. He denies any chest pain or chest pressure currently. Denies any nausea, vomiting, or shortness of breath. He is currently being planned for additional vascular access for medical treatment of his hyperkalemia pending emergent dialysis. PHYSICAL EXAMINATION:  GENERAL:  A middle-aged  male in bed.   VITAL SIGNS:  Most recent blood pressure documented as 153/82, pulse 93, oxygen saturation 100%, respiratory rate 18. HEENT:  Head is normocephalic. Eyes showed extraocular movement to be intact. NECK:  Without swelling. LUNGS:  Clear to auscultation. CARDIOVASCULAR:  Exam shows a rhythm which appears to be regular. ABDOMEN:  Nondistended and soft. EXTREMITIES:  No thigh edema. SKIN:  Warm to touch, the upper extremity (left) is bandaged. NEUROLOGIC:  He is awake, alert, and answers questions appropriately. LABORATORY DATA:  White blood cell count 10.6, hematocrit 31.1, platelet count 713,190. Sodium 132, potassium 6.9, total CO2 of 18. BUN and creatinine are 105 and 19.2. Calcium 8.9, albumin 3.5. ASSESSMENT AND PLAN:  The patient will be treated medically, pending emergent dialysis. I have spoken with [facility], indicated the nature of his hyperkalemia, and need for dialysis. I have told that this is planned intermittently. I have placed orders on his chart. We will dialyze him today. Assuming that his potassium has normalized, along with the functional catheter, he could be discharged home from our perspective and follow up with his physicians at [facility]. Thanks for the consult.         MD CHRIS Alfaro/[email or web address]  D:  09/10/2020 18:05  T:  09/10/2020 19:40  JOB #:  [id number]

## 2020-09-11 VITALS
SYSTOLIC BLOOD PRESSURE: 127 MMHG | DIASTOLIC BLOOD PRESSURE: 89 MMHG | HEART RATE: 96 BPM | BODY MASS INDEX: 24.22 KG/M2 | OXYGEN SATURATION: 100 % | TEMPERATURE: 98.2 F | RESPIRATION RATE: 18 BRPM | WEIGHT: 154.32 LBS | HEIGHT: 67 IN

## 2020-09-11 LAB
ANION GAP SERPL CALC-SCNC: 9 MMOL/L (ref 5–15)
ATRIAL RATE: 98 BPM
BUN SERPL-MCNC: 48 MG/DL (ref 6–20)
BUN/CREAT SERPL: 5 (ref 12–20)
CALCIUM SERPL-MCNC: 8.4 MG/DL (ref 8.5–10.1)
CALCULATED P AXIS, ECG09: 55 DEGREES
CALCULATED R AXIS, ECG10: 25 DEGREES
CALCULATED T AXIS, ECG11: -85 DEGREES
CHLORIDE SERPL-SCNC: 99 MMOL/L (ref 97–108)
CO2 SERPL-SCNC: 27 MMOL/L (ref 21–32)
CREAT SERPL-MCNC: 10.5 MG/DL (ref 0.7–1.3)
DIAGNOSIS, 93000: NORMAL
ERYTHROCYTE [DISTWIDTH] IN BLOOD BY AUTOMATED COUNT: 15.4 % (ref 11.5–14.5)
GLUCOSE SERPL-MCNC: 75 MG/DL (ref 65–100)
HBV SURFACE AG SER QL: <0.1 INDEX
HBV SURFACE AG SER QL: NEGATIVE
HCT VFR BLD AUTO: 28.6 % (ref 36.6–50.3)
HGB BLD-MCNC: 9.5 G/DL (ref 12.1–17)
INR PPP: 1.1 (ref 0.9–1.1)
MCH RBC QN AUTO: 31.1 PG (ref 26–34)
MCHC RBC AUTO-ENTMCNC: 33.2 G/DL (ref 30–36.5)
MCV RBC AUTO: 93.8 FL (ref 80–99)
NRBC # BLD: 0 K/UL (ref 0–0.01)
NRBC BLD-RTO: 0 PER 100 WBC
P-R INTERVAL, ECG05: 176 MS
PLATELET # BLD AUTO: 168 K/UL (ref 150–400)
PMV BLD AUTO: 10.7 FL (ref 8.9–12.9)
POTASSIUM SERPL-SCNC: 4.6 MMOL/L (ref 3.5–5.1)
PROTHROMBIN TIME: 11.5 SEC (ref 9–11.1)
Q-T INTERVAL, ECG07: 378 MS
QRS DURATION, ECG06: 108 MS
QTC CALCULATION (BEZET), ECG08: 482 MS
RBC # BLD AUTO: 3.05 M/UL (ref 4.1–5.7)
SODIUM SERPL-SCNC: 135 MMOL/L (ref 136–145)
VENTRICULAR RATE, ECG03: 98 BPM
WBC # BLD AUTO: 9.1 K/UL (ref 4.1–11.1)

## 2020-09-11 PROCEDURE — 80048 BASIC METABOLIC PNL TOTAL CA: CPT

## 2020-09-11 PROCEDURE — 74011250637 HC RX REV CODE- 250/637: Performed by: HOSPITALIST

## 2020-09-11 PROCEDURE — 85610 PROTHROMBIN TIME: CPT

## 2020-09-11 PROCEDURE — 94760 N-INVAS EAR/PLS OXIMETRY 1: CPT

## 2020-09-11 PROCEDURE — 99218 HC RM OBSERVATION: CPT

## 2020-09-11 PROCEDURE — 74011250637 HC RX REV CODE- 250/637: Performed by: INTERNAL MEDICINE

## 2020-09-11 PROCEDURE — 36415 COLL VENOUS BLD VENIPUNCTURE: CPT

## 2020-09-11 PROCEDURE — 85027 COMPLETE CBC AUTOMATED: CPT

## 2020-09-11 RX ADMIN — PANTOPRAZOLE SODIUM 40 MG: 40 TABLET, DELAYED RELEASE ORAL at 08:41

## 2020-09-11 RX ADMIN — Medication 10 ML: at 07:11

## 2020-09-11 RX ADMIN — HYDROCODONE BITARTRATE AND ACETAMINOPHEN 1 TABLET: 5; 325 TABLET ORAL at 04:03

## 2020-09-11 RX ADMIN — SEVELAMER CARBONATE 1600 MG: 800 TABLET, FILM COATED ORAL at 08:41

## 2020-09-11 RX ADMIN — HYDROCODONE BITARTRATE AND ACETAMINOPHEN 1 TABLET: 5; 325 TABLET ORAL at 08:40

## 2020-09-11 RX ADMIN — CALCITRIOL 0.5 MCG: 0.25 CAPSULE ORAL at 08:41

## 2020-09-11 RX ADMIN — ASPIRIN 81 MG: 81 TABLET, COATED ORAL at 08:41

## 2020-09-11 NOTE — PROGRESS NOTES
Blaise Deana         NAME:Claude [place]  [facility]:[id number]   :1975     Patient had hd last night  k 4.6 this  OKAY TO D/C HOME  HE WILL CONTINUE HIS HOME HEMODIALYSIS SCHEDULE. Recent Results (from the past 12 hour(s))   PROTHROMBIN TIME + INR    Collection Time: 20  5:48 AM   Result Value Ref Range    INR 1.1 0.9 - 1.1      Prothrombin time 11.5 (H) 9.0 - 92.5 sec   METABOLIC PANEL, BASIC    Collection Time: 20  5:48 AM   Result Value Ref Range    Sodium 135 (L) 136 - 145 mmol/L    Potassium 4.6 3.5 - 5.1 mmol/L    Chloride 99 97 - 108 mmol/L    CO2 27 21 - 32 mmol/L    Anion gap 9 5 - 15 mmol/L    Glucose 75 65 - 100 mg/dL    BUN 48 (H) 6 - 20 MG/DL    Creatinine 10.50 (H) 0.70 - 1.30 MG/DL    BUN/Creatinine ratio 5 (L) 12 - 20      GFR est AA 7 (L) >60 ml/min/1.73m2    GFR est non-AA 5 (L) >60 ml/min/1.73m2    Calcium 8.4 (L) 8.5 - 10.1 MG/DL   CBC W/O DIFF    Collection Time: 20  5:48 AM   Result Value Ref Range    WBC 9.1 4.1 - 11.1 K/uL    RBC 3.05 (L) 4.10 - 5.70 M/uL    HGB 9.5 (L) 12.1 - 17.0 g/dL    HCT 28.6 (L) 36.6 - 50.3 %    MCV 93.8 80.0 - 99.0 FL    MCH 31.1 26.0 - 34.0 PG    MCHC 33.2 30.0 - 36.5 g/dL    RDW 15.4 (H) 11.5 - 14.5 %    PLATELET 376 290 - 133 K/uL    MPV 10.7 8.9 - 12.9 FL    NRBC 0.0 0  WBC    ABSOLUTE NRBC 0.00 0.00 - 0.01 K/uL         Randi Barreto MD  [facility]  [facility]  [address], [place], [address] [place]  Phone - [phone]         Fax - [phone] [facility] Office  [address] [address]  Phone - [phone]        Fax - [phone]     [email or web address]

## 2020-09-11 NOTE — PROGRESS NOTES
Nephrology Progress Note  [address] [facility]  [facility], [place], [address]  Phone - [phone]               Fax - [phone]  Patient: Apolinar Hannah    YOB: 1975     Admit Date: 9/10/2020   Date- 9/11/2020     CC: Follow up for esrd          IMPRESSION & PLAN:   esrd - home hd- F/B [facility] nephrologist  - s/p hd yesterday  - next hd today at home  - okay to d/c home    Hyperkalemia--k 6.9  - improved with hd    S/p femoral hd cath placement    S/p left arm dialysis access placement    Anemia of ckd    Sec. hyperpara  - continue calcitriol  - continue renvela       Subjective: Interval History:   -  S/p hd last night  K improved to 4.6  No c/o sob,  No c/o chest pain,   No c/o nausea or vomiting, No c/o  fever. ROS:- As documented above  Objective:   Vitals:    09/10/20 2330 09/11/20 0350 09/11/20 0751 09/11/20 0817   BP: 119/68 120/83  127/89   Pulse: (!) 102 93  96   Resp: 18 18  18   Temp: 98.2 °F (36.8 °C) 98.1 °F (36.7 °C)  98.2 °F (36.8 °C)   TempSrc:       SpO2: 100% 100% 100% 100%   Weight:       Height:          No intake/output data recorded. Last 3 Recorded Weights in this Encounter    09/10/20 1315   Weight: 70 kg (154 lb 5.2 oz)      Physical exam:   GEN: NAD  NECK- Supple, no mass  RESP: Clear b/l, no wheezing  CVS: S1,S2  RRR  NEURO: Normal speech, Non focal  Abdo- soft, NT  EXT: No Edema   Left arm dressing +  PSYCH: Normal Mood  rigth femoral hd cath +  Chart reviewed. Pertinent Notes reviewed. Data Review :  Recent Labs     09/11/20  0548 09/10/20  1457   * 132*   K 4.6 6.9*   CL 99 101   CO2 27 18*   BUN 48* 105*   CREA 10.50* 19.20*   GLU 75 72   CA 8.4* 8.9     Recent Labs     09/11/20  0548 09/10/20  1457   WBC 9.1 10.6   HGB 9.5* 10.2*   HCT 28.6* 31.1*    237     No results for input(s): FE, TIBC, PSAT, FERR in the last 72 hours. Medication list  reviewed  Current Facility-Administered Medications   Medication Dose Route Frequency    acetaminophen (TYLENOL) tablet 650 mg  650 mg Oral Q6H PRN    ondansetron (ZOFRAN) injection 4 mg  4 mg IntraVENous Q6H PRN    sodium chloride (NS) flush 5-40 mL  5-40 mL IntraVENous Q8H    sodium chloride (NS) flush 5-40 mL  5-40 mL IntraVENous PRN    polyethylene glycol (MIRALAX) packet 17 g  17 g Oral DAILY PRN    calcitRIOL (ROCALTROL) capsule 0.5 mcg  0.5 mcg Oral DAILY    pantoprazole (PROTONIX) tablet 40 mg  40 mg Oral ACB    sevelamer carbonate (RENVELA) tab 1,600 mg  1,600 mg Oral TID WITH MEALS    aspirin delayed-release tablet 81 mg  81 mg Oral DAILY    metoprolol succinate (TOPROL-XL) XL tablet 12.5 mg  12.5 mg Oral QHS    HYDROcodone-acetaminophen (NORCO) 5-325 mg per tablet 1 Tab  1 Tab Oral Q4H PRN    morphine injection 1 mg  1 mg IntraVENous ONCE                     Rohith Merino MD                   [address] [facility]                   [email or web address]

## 2020-09-11 NOTE — CONSULTS
Blaise Leblanc         NAME:Claude Lawana Applebaum  [address]:[id number]   :1975                       Patient seen  S/p dialysis yesterday      Patient Active Problem List   Diagnosis Code    Kidney transplant     Coronary atherosclerosis of native coronary artery I25.10    Nausea & vomiting R11.2    Serum total bilirubin elevated R17    Abdominal pain R10.9    HTN (hypertension) I10    Anemia D64.9    S/P appendectomy Z90.49    High cholesterol E78.00    Other ill-defined conditions(799.89) R69    CAD (coronary artery disease) I25.10    Gastrointestinal disorder K92.9    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[address] D69.6    Peritonitis ([address] K65.9    Malnutrition ([address] E46    DVT (deep venous thrombosis) ([facility]) I82.409    S/P IVC filter Z95.828    Arterial occlusion I70.90    S/p cadaver renal transplant Z94.0    AICD (automatic cardioverter/defibrillator) present Z95.810    ESRD (end stage renal disease) on dialysis ([address] N18.6, Z99.2    Dialysis patient ([address] Z99.2    Hyperkalemia E87.5    Congestive heart failure, unspecified I50.9    Hypoglycemia E16.2    Sepsis (HCC) A41.9    Pneumonia J18.9    Abnormal EKG R94.31    ESRD (end stage renal disease) ([address] N18.6           Kanika Turner MD  [facility]  [facility], [address]  [place], [address]  Phone - [phone]         Fax - [phone] [facility] Office  [address] [place] [address]  Phone - [phone]        Fax - [phone]     [email or web address]

## 2020-09-11 NOTE — PROGRESS NOTES
Change of shift report given to Dexter Miranda RN (incoming Nurse), by Angella Treviño RN (outgoing Nurse). Information was given on SBAR, Kardex and MAR. Patient was transferred from the ED. Patient had Hemodialysis done last night, 2L was removed during dialysis. Patient has been up ad fouzia to the bathroom. Patient did complained of left hand pain while being dialyzed. Tylenol was given, see PRN MAR  but it did not relieved the patient's discomfort. The Tele-Hospitalist was notified of the patient's ongoing pain, Deana Mates was administered and prescribed, see PRN MAR. Patient was given scheduled medications, see MAR. Patient teaching and routine rounding has been done. Patient is being tele-monitored and is running Sinus Tachy.

## 2020-09-11 NOTE — DIALYSIS
Winter Dialysis Team [facility] Acutes  [phone]    Vitals   Pre   Post   Assessment   Pre   Post     Temp  Temp: 98 °F (36.7 °C) (09/10/20 1950)  98.2 LOC  AXOX3 AXOX3   HR   Pulse (Heart Rate): 96 (09/10/20 1950) 101 Lungs   CTA, on RA , sats 100%  CTA   B/P   BP: 145/85 (09/10/20 1950) 117/69 Cardiac   L AICD, S1S2  HR >100   Resp   Resp Rate: 21 (09/10/20 1950) 19 Skin   W/D. Compression/elastic dsg to LUE  W/D. Intact/dry LUE dsg   Pain level  0/10 LUE 8/10. RN notified. Edema    BLE, +1, LUE   Traces BLE   Orders:    Duration:   Start:   1950 End:   9826 Total:   3.5 hours   Dialyzer:   Dialyzer/Set Up Inspection: [place] (09/10/20 1950)   K Bath:   Dialysate K (mEq/L): 2 (09/10/20 1950)   Ca Bath:   Dialysate CA (mEq/L): 2.5 (09/10/20 1950)   Na/Bicarb:   Dialysate NA (mEq/L): 138 (09/10/20 1950)   Target Fluid Removal:   Goal/Amount of Fluid to Remove (mL): 2000 mL (09/10/20 1950)   Access     Type & Location:   R femoral CVC with dsg CDI dated for 09/10/20.  Both aspirate and NS flush well but only able to maintain -340 as machine alarming frequently for AP >-270   Labs     Obtained/Reviewed   Critical Results Called   Date when labs were drawn-  Hgb-    HGB   Date Value Ref Range Status   09/10/2020 10.2 (L) 12.1 - 17.0 g/dL Final     K-    Potassium   Date Value Ref Range Status   09/10/2020 6.9 (HH) 3.5 - 5.1 mmol/L Final     Comment:     RESULTS VERIFIED, PHONED TO AND READ BACK BY  HEMOLYZED SPECIMEN  SONJA RN KATHERYN [id number]       Ca-   Calcium   Date Value Ref Range Status   09/10/2020 8.9 8.5 - 10.1 MG/DL Final     Bun-   BUN   Date Value Ref Range Status   09/10/2020 105 (H) 6 - 20 MG/DL Final     Creat-   Creatinine   Date Value Ref Range Status   09/10/2020 19.20 (H) 0.70 - 1.30 MG/DL Final        Medications/ Blood Products Given     Name   Dose   Route and Time                     Blood Volume Processed (BVP):    63.2 Net Fluid   Removed:  2000 ml   Comments   Time Out Done: 1943  Primary Nurse Rpt Rian Boas, RN  Primary Nurse Rpt Post:JOSE Sandhu RN  Pt Education:Access care, procedure  Care Plan:Access care  Tx Summary:Tolerated and completed prescribed tx, stable throughout session. No meds given during tx. All possible blood rinsed back AET. Ports flushed and packed with NS ONLY. New caps applied and clamps secured. Pt was left in no new distress in care of primary RN at bedside. Admiting Diagnosis:  Pt's previous clinic-[facility](D)  Consent signed - Informed Consent Verified: Yes (09/10/20 1950)  Winter Consent - Obtained 09/10/20  Hepatitis Status- Drawn/pending 09/10/20  Machine #- Machine Number: [id number] (09/10/20 1950)  Telemetry status-Remote  Pre-dialysis wt. - Pre-Dialysis Weight: 69 kg (152 lb 1.9 oz) (09/10/20 1950)

## 2020-09-11 NOTE — DISCHARGE SUMMARY
Hospitalist Discharge Summary     Patient ID:  Bindu Coronado  [id number]  89 y.o.  1975  9/10/2020    PCP on record: Rena Almaguer MD    Admit date: 9/10/2020  Discharge date and time: 9/11/2020    DISCHARGE DIAGNOSIS:  Severe hyperkalemia   Hypervolemic hyponatremia    ESRD on home HD 5 times weekly  Nonfunctioning right femoral dialysis line replaced by interventional radiology  Bleeding from left upper arm dialysis fistula   History of recurrent left leg DVT   Chronic anticoagulation therapy (warfarin)  CAD s/p remote MI  ICMP (LVEF 40%)  VT s/p ICD implantation  History of HIT    CONSULTATIONS:  IP CONSULT TO VASCULAR SURGERY  IP CONSULT TO NEPHROLOGY  IP CONSULT TO HOSPITALIST    Excerpted HPI from H&P of Mickie Nichols MD:  Chani Barrientos is a 45 y.o. male has medical history of end-stage renal disease on home dialysis 5 times per day, followed by [facility] nephrologist Dr. Aguilar Cons, history of coronary artery disease and MI, ischemic cardiomyopathy EF of 40% in 2018, history of recurrent left leg DVT on Coumadin who presents to the emergency room with sudden bleeding from recent fistula that was placed about 4 days ago. He is on Coumadin but last took it last Wednesday and was supposed to restart Coumadin today. He last took aspirin yesterday. He denies any trauma to cause the bleed. He has a right femoral dialysis catheter which has not been working well for dialysis and as a result has not had dialysis since September 7. He denies any dizziness, lightheadedness, chest pain, shortness of breath. There has been some swelling in the left arm following the surgery, denies any redness or pain.     In the ER, bleeding was stopped with pressure dressing. However blood work shows potassium of 6.9 with peaked T waves. Right femoral dialysis line was replaced by IR and admission was recommended by nephrology given the EKG changes.   We were asked to admit for work up and evaluation of the above problems. \"    ______________________________________________________________________  DISCHARGE SUMMARY/HOSPITAL COURSE:  for full details see H&P, daily progress notes, labs, consult notes. Patient's arm was wrapped with an Ace bandage in the ED and subsequently there was no further signs of hemorrhage. He underwent HD and the following morning his potassium had returned to 4.6. He was seen and evaluated by the nephrologist who cleared him for discharge to home. He will continue home HD and follow-up with [facility] anticoagulation clinic for management of his warfarin. He will also follow-up with his outpatient nephrologist as scheduled. The patient was medically stable for discharge home. All discharge instructions were reviewed with the patient who verbalized understanding. At the time of this dictation patient's vital signs were as follows:          _______________________________________________________________________  Patient seen and examined by me on discharge day. Pertinent Findings:  General:  A/A/O X 3. NAD. HEENT:  Normocephalic. Sclera anicteric. EOMI. Mucous membranes moist.    Chest:  Resps even/unlabored with symmetrical CWE. Air entry full. Lungs CTA. No use of accessory muscles. CV:  RRR. GI:  Abdomen soft/NT/ND. ABT X 4.    :  HD  Neurologic:  Nonfocal.  CN II-XII grossly intact. Speech normal.     Psych:  Cooperative. No anxiety or agitation. Skin:  LUE with D/C/I ACE wrap. No rashes or jaundice. _______________________________________________________________________  DISCHARGE MEDICATIONS:   Current Discharge Medication List      CONTINUE these medications which have NOT CHANGED    Details   HYDROcodone-acetaminophen (Norco) 5-325 mg per tablet Take 1 Tab by mouth every four (4) hours as needed for Pain for up to 5 days. Max Daily Amount: 6 Tabs.   Qty: 25 Tab, Refills: 0    Associated Diagnoses: ESRD (end stage renal disease) [facility] ondansetron (ZOFRAN ODT) 4 mg disintegrating tablet Take 1 Tab by mouth every eight (8) hours as needed. Qty: 20 Tab, Refills: 0      sevelamer carbonate (RENVELA) 800 mg tab tab Take 1,600 mg by mouth three (3) times daily (with meals). Only takes if eating a  meal      !! warfarin (COUMADIN) 2.5 mg tablet Take 2.5 mg by mouth five (5) days a week. Every day except Monday & wednesday      !! warfarin (COUMADIN) 5 mg tablet Take 5 mg by mouth two (2) days a week. Monday & wednesday      metoprolol succinate (TOPROL-XL) 25 mg XL tablet Take 12.5 mg by mouth daily. am      omeprazole (PRILOSEC) 20 mg capsule Take 20 mg by mouth daily. calcitRIOL (ROCALTROL) 0.5 mcg capsule Take 0.5 mcg by mouth daily. aspirin 81 mg chewable tablet Take 81 mg by mouth daily. acetaminophen (TYLENOL) 500 mg tablet Take 1 Tab by mouth every four (4) hours as needed for Pain. Over the counter  Qty: 30 Tab, Refills: 0       !! - Potential duplicate medications found. Please discuss with provider. Patient Follow Up Instructions: Activity: Activity as tolerated  Diet: Cardiac Diet and Renal Diet  Wound Care: Keep LUE Ace wrap intact    Follow-up as noted below  Follow-up tests/labs Per PCP and nephrology.   Follow up with [facility] anticoagulation clinic for PT/INR  Follow-up Information     Follow up With Specialties Details Why Contact Info    Cathleen Perrin MD Family Medicine  Call for follow up appointment to be seen withing 1 week [place]  [phone]      Alison Savage MD Nephrology, Nephrology  Follow up as scheduled [address] [facility]  [phone]         Call [facility] anticoagulation clinic to have INR drawn and warfarin dose adjustments         ________________________________________________________________    Risk of deterioration: Moderate    Condition at Discharge: Stable  __________________________________________________________________    Disposition  Home with family, no needs    ____________________________________________________________________    Code Status: Full Code  ___________________________________________________________________      Total time in minutes spent coordinating this discharge (includes going over instructions, follow-up, prescriptions, and preparing report for sign off to her PCP) :  30 minutes    Signed:  Ney Dailey NP

## 2020-09-11 NOTE — DISCHARGE INSTRUCTIONS
Hyperkalemia: Care Instructions  Your Care Instructions     Hyperkalemia is too much potassium in the blood. Potassium helps keep the right mix of fluids in your body. It also helps your nerves and muscles work as they should. And it keeps your heartbeat in a normal rhythm. Some things can raise potassium levels. These include some health problems, medicines, and kidney problems. (Normally, your kidneys remove extra potassium.)  Too much potassium can cause nausea. It also can cause a heartbeat that isn't normal. But you may not have any symptoms. Too much potassium can be dangerous. That's why it's important to treat it. If you are taking any of the medicines that can raise your levels, your doctor will ask you to stop. You may get medicines to lower your levels. And you may have to limit or not eat foods that have a lot of potassium. Follow-up care is a key part of your treatment and safety. Be sure to make and go to all appointments, and call your doctor if you are having problems. It's also a good idea to know your test results and keep a list of the medicines you take. How can you care for yourself at home? · Take your medicines exactly as prescribed. Call your doctor if you think you are having a problem with your medicine. · Stop taking certain medicines if your doctor asks you to. They may be causing your high potassium levels. If you have concerns about stopping medicine, talk with your doctor. · If you have kidney, heart, or liver disease and have to limit fluids, talk with your doctor before you increase the amount of fluids you drink. If the doctor says it's okay, drink plenty of fluids. This means drinking enough so that your urine is light yellow or clear like water. · Avoid strenuous exercise until your doctor tells you it is okay. · Potassium is in many foods, including vegetables, fruits, and milk products.  Foods high in potassium include bananas, cantaloupe, broccoli, milk, potatoes, and tomatoes. · Low potassium foods include blueberries, raspberries, cucumber, white or brown rice, spaghetti, and macaroni. · Do not use a salt substitute without talking to your doctor first. Most of these are very high in potassium. · Be sure to tell your doctor about any prescription, over-the-counter, or herbal medicines you take. Some of these can raise potassium. When should you call for help? Call 911 anytime you think you may need emergency care. For example, call if:    · You passed out (lost consciousness).     · You have an unusual heartbeat. Your heart may beat fast or skip beats. Call your doctor now or seek immediate medical care if:    · You have muscle aches.     · You feel very weak. Watch closely for changes in your health, and be sure to contact your doctor if:    · You do not get better as expected. Where can you learn more? Go to http://anabel-mor.info/  Enter G087 in the search box to learn more about \"Hyperkalemia: Care Instructions. \"  Current as of: April 15, 2020               Content Version: 12.6  © 8466-5578 [employer]. Care instructions adapted under license by [employer] (which disclaims liability or warranty for this information). If you have questions about a medical condition or this instruction, always ask your healthcare professional. [place] [address] any warranty or liability for your use of this information.           HOSPITALIST DISCHARGE INSTRUCTIONS    NAME: Mikki Perkinsalton   :  1975   MRN:  [id number]     Date/Time:  2020 10:57 AM    ADMIT DATE: 9/10/2020   DISCHARGE DATE: 2020     Attending Physician: Maryan Ellis NP    DISCHARGE DIAGNOSIS:  Severe hyperkalemia (elevated blood potassium level)   Hypervolemic hyponatremia (low blood sodium caused by excess fluid)  End-stage renal disease  Nonfunctioning right femoral dialysis line replaced by interventional radiology  Bleeding from left upper arm dialysis fistula   History of recurrent left leg deep vein thrombosis (blood clots in the veins of the left leg)   Chronic anticoagulation therapy (warfarin)  Coronary artery disease with history of myocardial infarction (heart attack)  Ischemic cardiomyopathy (abnormal pumping of the heart caused by inadequate blood flow)  Ventricular tachycardia (rapid irregular heartbeat)  History of heparin-induced thrombocytopenia (low blood platelets caused from heparin)        Medications: Per above medication reconciliation. Pain Management: per above medications    Recommended diet: Renal/cardiac Diet    Recommended activity: Activity as tolerated    Wound care: Keep Ace wrap intact on left arm. Indwelling devices:  Dialysis catheter    Supplemental Oxygen: None    Required Lab work: PT/INR to be drawn by [facility] anticoagulation clinic    Glucose management:  None    Code status: Full    Take all discharge paperwork with you to all of your follow up doctor appointments. Take your medications exactly as outlined in your discharge paperwork. Do not take any other medications unless specifically prescribed after your hospital stay. Home dialysis as discussed with nephrologist.    Outside physician follow up: Follow-up Information     Follow up With Specialties Details Why Contact Info    Katia Price MD Family Medicine  Call for follow up appointment to be seen withing 1 week [address]      Meron Mcneil MD Nephrology, Nephrology  Follow up as scheduled [facility]  [phone]         Call [facility] anticoagulation clinic to have INR drawn and warfarin dose adjustments         Nice and rhythm  Information obtained by :  I understand that if any problems occur once I am at home I am to contact my physician. I understand and acknowledge receipt of the instructions indicated above. Physician's or R.N.'s Signature                                                                  Date/Time                                                                                                                                              Patient or Representative

## 2020-09-11 NOTE — ROUTINE PROCESS
Discharge instructions gone over with patient. IV and tele monitor removed. All belongings with patient. Wheeled to ED at discharge patient to drive self home.

## 2020-09-11 NOTE — PROGRESS NOTES
Telemed:I just check on the patient, his pain has gotten worse. Please advise. Thank you.     Plan: Spoke with nurse, resume Norco noted on [facility]

## 2020-09-11 NOTE — PROGRESS NOTES
MARINO  Home with home hemodialysis assisted by his sister; [facility] is his home hemod[facility] center ([phone]:  Fax [phone])  Most recent HD treatment sheet from [id number] was faxed to the home dialysis unit. Transportation will be provided by patient as he drove himself to hospital    Preferred Rx [facility] and [facility]    Patient is in OBS status. AMAYA and State OBS letters were given to patient and discussed;  Patient signed copies of both OBS letters and signed copies were placed on chart    Grant Nieto, [address] [facility].  Ext [phone]

## 2020-09-13 ENCOUNTER — HOSPITAL ENCOUNTER (EMERGENCY)
Age: 45
Discharge: HOME | End: 2020-09-13
Attending: EMERGENCY MEDICINE
Payer: MEDICARE

## 2020-09-13 VITALS
BODY MASS INDEX: 24.22 KG/M2 | SYSTOLIC BLOOD PRESSURE: 148 MMHG | WEIGHT: 154.32 LBS | OXYGEN SATURATION: 99 % | TEMPERATURE: 97.8 F | DIASTOLIC BLOOD PRESSURE: 96 MMHG | RESPIRATION RATE: 18 BRPM | HEART RATE: 101 BPM | HEIGHT: 67 IN

## 2020-09-13 DIAGNOSIS — G89.18 POST-OPERATIVE PAIN: Primary | ICD-10-CM

## 2020-09-13 PROCEDURE — 99282 EMERGENCY DEPT VISIT SF MDM: CPT

## 2020-09-13 RX ORDER — OXYCODONE HYDROCHLORIDE 5 MG/1
5 TABLET ORAL
Qty: 8 TAB | Refills: 0 | Status: SHIPPED | OUTPATIENT
Start: 2020-09-13 | End: 2020-09-16

## 2020-09-13 NOTE — ED NOTES
Pt received from triage with LUE swelling after unsuccessful dialysis fistula attempt 9/8. LUE is swollen and tight and extends into anterior chest. 2+ pulses present. Limited ROM. Pt reports he is here for pain management since he ran out of his oxycodone that he was prescribed postoperatively. Pt A&Ox4 and call bell within reach.

## 2020-09-13 NOTE — DISCHARGE INSTRUCTIONS
Patient Education        Acute Pain After Surgery: Care Instructions  Your Care Instructions     It's common to have some pain after surgery. Pain doesn't mean that something is wrong or that the surgery didn't go well. But when the pain is severe, it's important to work with your doctor to manage it. It's also important to be aware of a few facts about pain and pain medicine. · You are the only person who knows what your pain feels like. So be sure to tell your doctor when you are in pain or when the pain changes. Then he or she will know how to adjust your medicines. · Pain is often easier to control right after it starts. So it may be better to take regular doses of pain medicine and not wait until the pain gets bad. · Medicine can help control pain. But this doesn't mean you'll have no pain. Medicine works to keep the pain at a level you can live with. With time, you will feel better. Follow-up care is a key part of your treatment and safety. Be sure to make and go to all appointments, and call your doctor if you are having problems. It's also a good idea to know your test results and keep a list of the medicines you take. How can you care for yourself at home? · Be safe with medicines. Read and follow all instructions on the label. ? If the doctor gave you a prescription medicine for pain, take it as prescribed. ? If you are not taking a prescription pain medicine, ask your doctor if you can take an over-the-counter medicine. · If you take an over-the-counter pain medicine, such as acetaminophen (Tylenol), ibuprofen (Advil, Motrin), or naproxen (Aleve), read and follow all instructions on the label. · Do not take two or more pain medicines at the same time unless the doctor told you to. · Do not drink alcohol while you are taking pain medicines. · Try to walk each day if your doctor recommends it. Start by walking a little more than you did the day before. Bit by bit, increase the amount you walk. Walking increases blood flow. It also helps prevent pneumonia and constipation. · To prevent constipation from opioid pain medicines:  ? Talk to your doctor about a laxative. ? Include fruits, vegetables, beans, and whole grains in your diet each day. These foods are high in fiber. ? Drink plenty of fluids, enough so that your urine is light yellow or clear like water. Drink water, fruit juice, or other drinks that do not contain caffeine or alcohol. If you have kidney, heart, or liver disease and have to limit fluids, talk with your doctor before you increase the amount of fluids you drink. ? Take a fiber supplement, such as Citrucel or Metamucil, every day if needed. Read and follow all instructions on the label. If you take pain medicine for more than a few days, talk to your doctor before you take fiber. When should you call for help? Call your doctor now or seek immediate medical care if:    · Your pain gets worse.     · Your pain is not controlled by medicine. Watch closely for changes in your health, and be sure to contact your doctor if you have any problems. Where can you learn more? Go to http://anabel-mor.info/  Enter H549 in the search box to learn more about \"Acute Pain After Surgery: Care Instructions. \"  Current as of: June 26, 2019               Content Version: 12.6  © 5319-6205 [employer]. Care instructions adapted under license by [employer] (which disclaims liability or warranty for this information). If you have questions about a medical condition or this instruction, always ask your healthcare professional. [place] [address] any warranty or liability for your use of this information.

## 2020-09-13 NOTE — ED PROVIDER NOTES
EMERGENCY DEPARTMENT HISTORY AND PHYSICAL EXAM      Date: 9/13/2020  Patient Name: Tali King    History of Presenting Illness     Chief Complaint   Patient presents with    Post OP Complication     dialysis port swelling        History Provided By: Patient    HPI: Tali King, 45 y.o. male with PMHx as noted below presents the emergency department with complaints of postoperative pain. Patient notes that he had a graft placed by Dr. Leon Ortega this past Tuesday in his left upper arm. Notes since then has had persistent pain and swelling at the surgical site. He describes a constant, dull aching pain that is worse with moving his arm and palpation. Otherwise notes pain does not radiate. He is denying any fevers, chills or other systemic symptoms. There is been no purulent drainage from the wound. Patient is denying any pain in the lower arm or numbness/tingling of the extremity. PCP: Pearley Mcardle, MD    Current Outpatient Medications   Medication Sig Dispense Refill    oxyCODONE IR (Roxicodone) 5 mg immediate release tablet Take 1 Tab by mouth every six (6) hours as needed for Pain for up to 3 days. Max Daily Amount: 20 mg. 8 Tab 0    HYDROcodone-acetaminophen (Norco) 5-325 mg per tablet Take 1 Tab by mouth every four (4) hours as needed for Pain for up to 5 days. Max Daily Amount: 6 Tabs. 25 Tab 0    ondansetron (ZOFRAN ODT) 4 mg disintegrating tablet Take 1 Tab by mouth every eight (8) hours as needed. 20 Tab 0    acetaminophen (TYLENOL) 500 mg tablet Take 1 Tab by mouth every four (4) hours as needed for Pain. Over the counter 30 Tab 0    sevelamer carbonate (RENVELA) 800 mg tab tab Take 1,600 mg by mouth three (3) times daily (with meals). Only takes if eating a  meal      warfarin (COUMADIN) 2.5 mg tablet Take 2.5 mg by mouth five (5) days a week. Every day except Monday & wednesday      warfarin (COUMADIN) 5 mg tablet Take 5 mg by mouth two (2) days a week.  Monday & wednesday     [address] [place] [address] metoprolol succinate (TOPROL-XL) 25 mg XL tablet Take 12.5 mg by mouth daily. am      omeprazole (PRILOSEC) 20 mg capsule Take 20 mg by mouth daily.  calcitRIOL (ROCALTROL) 0.5 mcg capsule Take 0.5 mcg by mouth daily.  aspirin 81 mg chewable tablet Take 81 mg by mouth daily. Past History     Past Medical History:  Past Medical History:   Diagnosis Date    Abdominal hematoma     AICD (automatic cardioverter/defibrillator) present     CAD (coronary artery disease)     anterior MI s/p 2 stents  2007    Chronic abdominal pain     Chronic kidney disease     ESRD; Dialysis dependent.  [facility] tpke    DVT (deep venous thrombosis) ([facility] 2001    DVT of popliteal vein ([facility] 11/2011    not anticoagulated due to bleeding, IVC filter    Endocarditis     Gallstone pancreatitis     Gastrointestinal disorder     acid reflux    Gastrointestinal disorder     peptic ulcer    Hemodialysis patient ([facility]     High cholesterol     Hypertension     Kidney transplant     b/l kidneys 1995, 2001    Long term current use of anticoagulant therapy     Nephrotic syndrome     Other ill-defined conditions(799.89)     kidny transplant x2,  on dialysis    Other ill-defined conditions(799.89)     home dialysis    Other ill-defined conditions(799.89)     hx recurrent left leg DVT    Peritonitis ([facility]     Seizures ([address] 2015    most recent- only had three in lifetime    Small bowel obstruction (HCC)     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[facility]     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    [facility]ach [facility])        Past Surgical History:  Past Surgical History:   Procedure Laterality Date    CARDIAC SURG PROCEDURE UNLIST  2007    2 stents    HX APPENDECTOMY      HX CHOLECYSTECTOMY      HX OTHER SURGICAL      cholecystectomy    HX OTHER SURGICAL  11/2011    exploratory laparotomy    HX OTHER SURGICAL      fem-pop    HX OTHER SURGICAL  02/2013    right upper extremity fistula    HX PACEMAKER Left 6/2009 AICD-st latonya-not connected    HX PACEMAKER Left     AICD-left side-BS-working    HX SMALL BOWEL RESECTION      HX TRANSPLANT  2001     Kidney    HX TRANSPLANT  1992    kidney    HX UROLOGICAL      2 kidney transplants    HX VASCULAR ACCESS Right     femoral access for HD- does 5 day dialysis @ home    IR REPLACE CVC TUNNELED W/O PORT  9/10/2020    NC EDG US EXAM SURGICAL ALTER STOM DUODENUM/JEJUNUM  7/15/2011         NC ESOPHAGOGASTRODUODENOSCOPY TRANSORAL DIAGNOSTIC  5/24/2012         VASCULAR SURGERY PROCEDURE UNLIST  11/14/2017    Insertion AV graft right upper arm (bovine); ligation of AV fistula right arm       Family History:  Family History   Problem Relation Age of Onset    COPD Mother     Lung Disease Mother     Cancer Father         stomach    Cancer Maternal Grandmother        Social History:  Social History     Tobacco Use    Smoking status: Never Smoker    Smokeless tobacco: Never Used   Substance Use Topics    Alcohol use: No    Drug use: Yes     Types: Prescription, OTC       Allergies: Allergies   Allergen Reactions    Shellfish Containing Products Swelling     Break out in rash, trouble breathing    Heparin Analogues Other (comments)     Positive history of HIT    Iodinated Contrast Media Other (comments)     Because of renal disease. No rash or hives per patient report 1/14/2012         Review of Systems   Review of Systems  Constitutional: Negative for fever, chills, and fatigue. HENT: Negative for congestion, sore throat, rhinorrhea, sneezing and neck stiffness   Eyes: Negative for discharge and redness. Respiratory: Negative for  shortness of breath, wheezing   Cardiovascular: Negative for chest pain, palpitations   Gastrointestinal: Negative for nausea, vomiting, abdominal pain, constipation, diarrhea and blood in stool. Genitourinary: Negative for dysuria, hematuria, flank pain, decreased urine volume, discharge,   Musculoskeletal: Negative for myalgias.   Positive arm pain  Skin: Negative for rash or lesions . Neurological: Negative weakness, light-headedness, numbness and headaches. Physical Exam   Physical Exam    GENERAL: alert and oriented, no acute distress  EYES: PEERL, No injection, discharge or icterus. ENT: Mucous membranes pink and moist.  NECK: Supple  LUNGS: Airway patent. Non-labored respirations. Breath sounds clear with good air entry bilaterally. HEART: Regular rate and rhythm. No peripheral edema  ABDOMEN: Non-distended and non-tender, without guarding or rebound. SKIN:  warm, dry  MSK/EXTREMITIES: There is some soft tissue swelling and tenderness around the surgical site. No overt erythema, warmth or purulent drainage. He has palpable distal pulses. NEUROLOGICAL: Alert, oriented      Diagnostic Study Results     Labs -   No results found for this or any previous visit (from the past 12 hour(s)). Radiologic Studies -   No orders to display     CT Results  (Last 48 hours)    None        CXR Results  (Last 48 hours)    None            Medical Decision Making   ITe MD am the first provider for this patient and am the attending of record for this patient encounter. I reviewed the vital signs, available nursing notes, past medical history, past surgical history, family history and social history. Vital Signs-Reviewed the patient's vital signs. Patient Vitals for the past 12 hrs:   Temp Pulse Resp BP SpO2   09/13/20 0705 97.8 °F (36.6 °C) (!) 101 18 (!) 148/96 99 %     Records Reviewed: Nursing Notes and Old Medical Records    Provider Notes (Medical Decision Making): On presentation, the patient is well appearing, in no acute distress with normal vital signs. Based on my history and exam the differential diagnosis for this patient includes cellulitis, postoperative infection, vascular occlusion. No signs of significant vascular occlusion here today.   Uncertain if this may be early postoperative infection versus normal wound healing however no signs of overt infection. I did consult with Dr. Adolph Rajan, vascular surgery and they will see patient in the vascular access center tomorrow morning for further assessment. Patient was given pain medication. ED Course:   Initial assessment performed. The patients presenting problems have been discussed, and they are in agreement with the care plan formulated and outlined with them. I have encouraged them to ask questions as they arise throughout their visit. PROGRESS  Regine Huerta  results have been reviewed with him. He has been counseled regarding his diagnosis. He verbally conveys understanding and agreement of the signs, symptoms, diagnosis, treatment and prognosis and additionally agrees to follow up as recommended with Dr. Camila Agarwal MD in 24 - 48 hours. He also agrees with the care-plan and conveys that all of his questions have been answered. I have also put together some discharge instructions for him that include: 1) educational information regarding their diagnosis, 2) how to care for their diagnosis at home, as well a 3) list of reasons why they would want to return to the ED prior to their follow-up appointment, should their condition change. Disposition:  home    PLAN:  1. Discharge Medication List as of 9/13/2020  7:50 AM      START taking these medications    Details   oxyCODONE IR (Roxicodone) 5 mg immediate release tablet Take 1 Tab by mouth every six (6) hours as needed for Pain for up to 3 days. Max Daily Amount: 20 mg., Normal, Disp-8 Tab,R-0         CONTINUE these medications which have NOT CHANGED    Details   HYDROcodone-acetaminophen (Norco) 5-325 mg per tablet Take 1 Tab by mouth every four (4) hours as needed for Pain for up to 5 days. Max Daily Amount: 6 Tabs., Normal, Disp-25 Tab,R-0      ondansetron (ZOFRAN ODT) 4 mg disintegrating tablet Take 1 Tab by mouth every eight (8) hours as needed. , Print, Disp-20 Tab, R-0 acetaminophen (TYLENOL) 500 mg tablet Take 1 Tab by mouth every four (4) hours as needed for Pain. Over the counter, No Print, Disp-30 Tab, R-0      sevelamer carbonate (RENVELA) 800 mg tab tab Take 1,600 mg by mouth three (3) times daily (with meals). Only takes if eating a  meal, Historical Med      !! warfarin (COUMADIN) 2.5 mg tablet Take 2.5 mg by mouth five (5) days a week. Every day except Monday & wednesday, Historical Med      !! warfarin (COUMADIN) 5 mg tablet Take 5 mg by mouth two (2) days a week. Monday & wednesday, Historical Med      metoprolol succinate (TOPROL-XL) 25 mg XL tablet Take 12.5 mg by mouth daily. am, Historical Med      omeprazole (PRILOSEC) 20 mg capsule Take 20 mg by mouth daily. , Historical Med      calcitRIOL (ROCALTROL) 0.5 mcg capsule Take 0.5 mcg by mouth daily. , Historical Med      aspirin 81 mg chewable tablet Take 81 mg by mouth daily. , Historical Med       !! - Potential duplicate medications found. Please discuss with provider. 2.   Follow-up Information     Follow up With Specialties Details Why Contact Info    [facility] EMERGENCY DEPT Emergency Medicine  If symptoms worsen [address] [address] [address]    Brock Mason MD Vascular Surgery   [address] [facility]  [address] [address] [phone]      Call the vascular access center Monday morning for appointment at [facility] at [phone]. Return to ED if worse     Diagnosis     Clinical Impression:   1. Post-operative pain        Please note that this dictation was completed with Dragon, computer voice recognition software. Quite often unanticipated grammatical, syntax, homophones, and other interpretive errors are inadvertently transcribed by the computer software. Please disregard these errors. Additionally, please excuse any errors that have escaped final proofreading.

## 2020-09-13 NOTE — ED TRIAGE NOTES
Pt presents w/ c/o left arm pain and swelling. Pt had dialysis port placed Tuesday 9/8/2020.  Pt reports being seen in ED on Thursday 9/10/2020 and states the surgery who put the port in saw him at that time

## 2020-09-13 NOTE — ED NOTES
Patient discharged by Dr. Orlin Wagoner. Patient provided with discharge instructions Rx and instructions on follow up care. Patient ambulatory out of ED.

## 2020-09-14 ENCOUNTER — PATIENT OUTREACH (OUTPATIENT)
Dept: CASE MANAGEMENT | Age: 45
End: 2020-09-14

## 2020-09-15 NOTE — PROGRESS NOTES
Patient contacted regarding recent discharge and COVID-19 risk. Discussed COVID-19 related testing which was not done at this time. Test results were not done. Patient informed of results, if available? n/a    Care Transition Nurse/ Ambulatory Care Manager/ LPN Care Coordinator contacted the patient by telephone to perform post discharge assessment. Verified name and  with patient as identifiers. Patient has following risk factors of: Arm swelling/post-op dialysis port insertion. CTN/ACM/LPN reviewed discharge instructions, medical action plan and red flags related to discharge diagnosis. Reviewed and educated them on any new and changed medications related to discharge diagnosis. Advised obtaining a 90-day supply of all daily and as-needed medications. Advance Care Planning:   Does patient have an Advance Directive: health care decision makers updated    Education provided regarding infection prevention, and signs and symptoms of COVID-19 and when to seek medical attention with patient who verbalized understanding. Discussed exposure protocols and quarantine from [address] you at higher risk for severe illness  and given an opportunity for questions and concerns. The patient agrees to contact the COVID-19 hotline [phone] or PCP office for questions related to their healthcare. CTN/ACM/LPN provided contact information for future reference. From CDC: Are you at higher risk for severe illness?  Wash your hands often.  Avoid close contact (6 feet, which is about two arm lengths) with people who are sick.  Put distance between yourself and other people if COVID-19 is spreading in your community.  Clean and disinfect frequently touched surfaces.  Avoid all cruise travel and non-essential air travel.  Call your healthcare professional if you have concerns about COVID-19 and your underlying condition or if you are sick.     For more information on steps you can take to protect yourself, see CDC's How to Protect Yourself      Patient/family/caregiver given information for GetWell Loop and agrees to enroll yes  Patient's preferred e-mail:  [email or web address]   Patient's preferred phone number: [phone]   Based on Loop alert triggers, patient will be contacted by nurse care manager for worsening symptoms. Pt will be further monitored by COVID Loop Team based on severity of symptoms and risk factors.    Patient has followed up with Nephrology

## 2020-09-15 NOTE — ACP (ADVANCE CARE PLANNING)
Advance Care Planning:   Does patient have an Advance Directive: health care decision makers updated

## 2020-10-20 ENCOUNTER — APPOINTMENT (OUTPATIENT)
Dept: GENERAL RADIOLOGY | Age: 45
End: 2020-10-20
Attending: EMERGENCY MEDICINE
Payer: MEDICARE

## 2020-10-20 ENCOUNTER — HOSPITAL ENCOUNTER (EMERGENCY)
Age: 45
Discharge: HOME | End: 2020-10-21
Attending: EMERGENCY MEDICINE
Payer: MEDICARE

## 2020-10-20 DIAGNOSIS — Z99.2 ESRD (END STAGE RENAL DISEASE) ON DIALYSIS (HCC): Primary | ICD-10-CM

## 2020-10-20 DIAGNOSIS — N18.6 ESRD (END STAGE RENAL DISEASE) ON DIALYSIS (HCC): Primary | ICD-10-CM

## 2020-10-20 DIAGNOSIS — B34.9 VIRAL SYNDROME: ICD-10-CM

## 2020-10-20 DIAGNOSIS — Z20.822 PERSON UNDER INVESTIGATION FOR COVID-19: ICD-10-CM

## 2020-10-20 LAB — LACTATE BLD-SCNC: 1.01 MMOL/L (ref 0.4–2)

## 2020-10-20 PROCEDURE — 36415 COLL VENOUS BLD VENIPUNCTURE: CPT

## 2020-10-20 PROCEDURE — 71045 X-RAY EXAM CHEST 1 VIEW: CPT

## 2020-10-20 PROCEDURE — 87077 CULTURE AEROBIC IDENTIFY: CPT

## 2020-10-20 PROCEDURE — 80053 COMPREHEN METABOLIC PANEL: CPT

## 2020-10-20 PROCEDURE — 74011250637 HC RX REV CODE- 250/637: Performed by: EMERGENCY MEDICINE

## 2020-10-20 PROCEDURE — 85025 COMPLETE CBC W/AUTO DIFF WBC: CPT

## 2020-10-20 PROCEDURE — 83605 ASSAY OF LACTIC ACID: CPT

## 2020-10-20 PROCEDURE — 93005 ELECTROCARDIOGRAM TRACING: CPT

## 2020-10-20 PROCEDURE — 87040 BLOOD CULTURE FOR BACTERIA: CPT

## 2020-10-20 PROCEDURE — 82550 ASSAY OF CK (CPK): CPT

## 2020-10-20 PROCEDURE — 99285 EMERGENCY DEPT VISIT HI MDM: CPT

## 2020-10-20 PROCEDURE — 84484 ASSAY OF TROPONIN QUANT: CPT

## 2020-10-20 PROCEDURE — 87186 SC STD MICRODIL/AGAR DIL: CPT

## 2020-10-20 RX ORDER — OXYCODONE HYDROCHLORIDE 5 MG/1
5 TABLET ORAL
Status: COMPLETED | OUTPATIENT
Start: 2020-10-20 | End: 2020-10-20

## 2020-10-20 RX ADMIN — OXYCODONE 5 MG: 5 TABLET ORAL at 23:33

## 2020-10-21 VITALS
HEART RATE: 99 BPM | BODY MASS INDEX: 23.36 KG/M2 | HEIGHT: 67 IN | SYSTOLIC BLOOD PRESSURE: 116 MMHG | TEMPERATURE: 98.5 F | OXYGEN SATURATION: 99 % | RESPIRATION RATE: 17 BRPM | WEIGHT: 148.81 LBS | DIASTOLIC BLOOD PRESSURE: 74 MMHG

## 2020-10-21 LAB
ALBUMIN SERPL-MCNC: 2.9 G/DL (ref 3.5–5)
ALBUMIN/GLOB SERPL: 0.7 {RATIO} (ref 1.1–2.2)
ALP SERPL-CCNC: 275 U/L (ref 45–117)
ALT SERPL-CCNC: 142 U/L (ref 12–78)
ANION GAP SERPL CALC-SCNC: 12 MMOL/L (ref 5–15)
AST SERPL-CCNC: 33 U/L (ref 15–37)
ATRIAL RATE: 106 BPM
BASOPHILS # BLD: 0.2 K/UL (ref 0–0.1)
BASOPHILS NFR BLD: 2 % (ref 0–1)
BILIRUB SERPL-MCNC: 0.6 MG/DL (ref 0.2–1)
BUN SERPL-MCNC: 36 MG/DL (ref 6–20)
BUN/CREAT SERPL: 4 (ref 12–20)
CALCIUM SERPL-MCNC: 6.9 MG/DL (ref 8.5–10.1)
CALCULATED P AXIS, ECG09: 64 DEGREES
CALCULATED R AXIS, ECG10: 66 DEGREES
CALCULATED T AXIS, ECG11: -63 DEGREES
CHLORIDE SERPL-SCNC: 97 MMOL/L (ref 97–108)
CK SERPL-CCNC: 147 U/L (ref 39–308)
CO2 SERPL-SCNC: 24 MMOL/L (ref 21–32)
COVID-19, XGCOVT: NOT DETECTED
CREAT SERPL-MCNC: 9.65 MG/DL (ref 0.7–1.3)
DIAGNOSIS, 93000: NORMAL
DIFFERENTIAL METHOD BLD: ABNORMAL
EOSINOPHIL # BLD: 0 K/UL (ref 0–0.4)
EOSINOPHIL NFR BLD: 0 % (ref 0–7)
ERYTHROCYTE [DISTWIDTH] IN BLOOD BY AUTOMATED COUNT: 17.1 % (ref 11.5–14.5)
FLUAV AG NPH QL IA: NEGATIVE
FLUBV AG NOSE QL IA: NEGATIVE
GLOBULIN SER CALC-MCNC: 4.3 G/DL (ref 2–4)
GLUCOSE SERPL-MCNC: 91 MG/DL (ref 65–100)
HCT VFR BLD AUTO: 27.1 % (ref 36.6–50.3)
HEALTH STATUS, XMCV2T: NORMAL
HGB BLD-MCNC: 9.4 G/DL (ref 12.1–17)
IMM GRANULOCYTES # BLD AUTO: 0 K/UL (ref 0–0.04)
IMM GRANULOCYTES NFR BLD AUTO: 0 % (ref 0–0.5)
LYMPHOCYTES # BLD: 1.2 K/UL (ref 0.8–3.5)
LYMPHOCYTES NFR BLD: 13 % (ref 12–49)
MCH RBC QN AUTO: 32.3 PG (ref 26–34)
MCHC RBC AUTO-ENTMCNC: 34.7 G/DL (ref 30–36.5)
MCV RBC AUTO: 93.1 FL (ref 80–99)
MONOCYTES # BLD: 0.9 K/UL (ref 0–1)
MONOCYTES NFR BLD: 10 % (ref 5–13)
NEUTS SEG # BLD: 7 K/UL (ref 1.8–8)
NEUTS SEG NFR BLD: 75 % (ref 32–75)
NRBC # BLD: 0 K/UL (ref 0–0.01)
NRBC BLD-RTO: 0 PER 100 WBC
P-R INTERVAL, ECG05: 172 MS
PLATELET # BLD AUTO: 118 K/UL (ref 150–400)
PMV BLD AUTO: 10.5 FL (ref 8.9–12.9)
POTASSIUM SERPL-SCNC: 3.8 MMOL/L (ref 3.5–5.1)
PROT SERPL-MCNC: 7.2 G/DL (ref 6.4–8.2)
Q-T INTERVAL, ECG07: 362 MS
QRS DURATION, ECG06: 102 MS
QTC CALCULATION (BEZET), ECG08: 480 MS
RBC # BLD AUTO: 2.91 M/UL (ref 4.1–5.7)
RBC MORPH BLD: ABNORMAL
SODIUM SERPL-SCNC: 133 MMOL/L (ref 136–145)
SOURCE, COVRS: NORMAL
SPECIMEN SOURCE, FCOV2M: NORMAL
SPECIMEN TYPE, XMCV1T: NORMAL
TROPONIN I SERPL-MCNC: <0.05 NG/ML
VENTRICULAR RATE, ECG03: 106 BPM
WBC # BLD AUTO: 9.3 K/UL (ref 4.1–11.1)

## 2020-10-21 PROCEDURE — 87804 INFLUENZA ASSAY W/OPTIC: CPT

## 2020-10-21 PROCEDURE — 87635 SARS-COV-2 COVID-19 AMP PRB: CPT

## 2020-10-21 NOTE — ED PROVIDER NOTES
EMERGENCY DEPARTMENT HISTORY AND PHYSICAL EXAM      Date: 10/20/2020  Patient Name: Flip Dutta    History of Presenting Illness     Chief Complaint   Patient presents with    Fever     100.1 at home x yesterday    Generalized Body Aches     x Sunday    Shortness of Breath     x Sunday; dialysis patient, reports that he has not been dialyzed since Sunday due to current sx's       History Provided By: Patient    HPI: Flip Dutta, 39 y.o. male with PMHx significant for end-stage renal disease on home dialysis 5 days a week, coronary artery disease, AICD in place, endocarditis, pancreatitis, hypertension, status post kidney transplant presents to the ED with chief complaint of generally not feeling well, low-grade fevers, and body aches that started this morning. He denies any cough, nausea, vomiting. He reports a temperature of 100.5 this morning and took Tylenol about 3 or 4 hours ago. He does report some shortness of breath, but denies any abdominal pain or chest pain. He denies any COVID-19 exposures or any other sick contacts. He reports leg swelling, but states that his last dialysis was 2 days ago and his legs usually swell when he has missed 2 days of dialysis. PCP: Daniel Katz MD    No current facility-administered medications on file prior to encounter. Current Outpatient Medications on File Prior to Encounter   Medication Sig Dispense Refill    ondansetron (ZOFRAN ODT) 4 mg disintegrating tablet Take 1 Tab by mouth every eight (8) hours as needed. 20 Tab 0    acetaminophen (TYLENOL) 500 mg tablet Take 1 Tab by mouth every four (4) hours as needed for Pain. Over the counter 30 Tab 0    sevelamer carbonate (RENVELA) 800 mg tab tab Take 1,600 mg by mouth three (3) times daily (with meals). Only takes if eating a  meal      warfarin (COUMADIN) 2.5 mg tablet Take 2.5 mg by mouth five (5) days a week.  Every day except Monday & wednesday      warfarin (COUMADIN) 5 mg tablet Take 5 mg by mouth two (2) days a week. Monday & wednesday      metoprolol succinate (TOPROL-XL) 25 mg XL tablet Take 12.5 mg by mouth daily. am      omeprazole (PRILOSEC) 20 mg capsule Take 20 mg by mouth daily.  calcitRIOL (ROCALTROL) 0.5 mcg capsule Take 0.5 mcg by mouth daily.  aspirin 81 mg chewable tablet Take 81 mg by mouth daily. Past History     Past Medical History:  Past Medical History:   Diagnosis Date    Abdominal hematoma     AICD (automatic cardioverter/defibrillator) present     CAD (coronary artery disease)     anterior MI s/p 2 stents  2007    Chronic abdominal pain     Chronic kidney disease     ESRD; Dialysis dependent.  Home [facility] does labs- [facility] tpke    DVT (deep venous thrombosis) ([facility] 2001    DVT of popliteal vein ([facility] 11/2011    not anticoagulated due to bleeding, IVC filter    Endocarditis     Gallstone pancreatitis     Gastrointestinal disorder     acid reflux    Gastrointestinal disorder     peptic ulcer    Hemodialysis patient ([facility]     High cholesterol     Hypertension     Kidney transplant     b/l kidneys 1995, 2001    Long term current use of anticoagulant therapy     Nephrotic syndrome     Other ill-defined conditions(799.89)     kidny transplant x2,  on dialysis    Other ill-defined conditions(799.89)     home dialysis    Other ill-defined conditions(799.89)     hx recurrent left leg DVT    Peritonitis ([address]     Seizures ([address] 2015    most recent- only had three in lifetime    Small bowel obstruction (HCC)     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    V-tach [facility])        Past Surgical History:  Past Surgical History:   Procedure Laterality Date    CARDIAC SURG PROCEDURE UNLIST  2007    2 stents    HX APPENDECTOMY      HX CHOLECYSTECTOMY      HX OTHER SURGICAL      cholecystectomy    HX OTHER SURGICAL  11/2011    exploratory laparotomy    HX OTHER SURGICAL      fem-pop    HX OTHER SURGICAL  02/2013    right upper extremity fistula    HX PACEMAKER Left 6/2009    AICD-st latonya-not connected    HX PACEMAKER Left     AICD-left side-BS-working    HX SMALL BOWEL RESECTION      HX TRANSPLANT  2001     Kidney    HX TRANSPLANT  1992    kidney    HX UROLOGICAL      2 kidney transplants    HX VASCULAR ACCESS Right     femoral access for HD- does 5 day dialysis @ home    IR REPLACE CVC TUNNELED W/O PORT  9/10/2020    NC EDG US EXAM SURGICAL ALTER STOM DUODENUM/JEJUNUM  7/15/2011         NC ESOPHAGOGASTRODUODENOSCOPY TRANSORAL DIAGNOSTIC  5/24/2012         VASCULAR SURGERY PROCEDURE UNLIST  11/14/2017    Insertion AV graft right upper arm (bovine); ligation of AV fistula right arm       Family History:  Family History   Problem Relation Age of Onset    COPD Mother     Lung Disease Mother     Cancer Father         stomach    Cancer Maternal Grandmother        Social History:  Social History     Tobacco Use    Smoking status: Never Smoker    Smokeless tobacco: Never Used   Substance Use Topics    Alcohol use: No    Drug use: Yes     Types: Prescription, OTC       Allergies: Allergies   Allergen Reactions    Shellfish Containing Products Swelling     Break out in rash, trouble breathing    Heparin Analogues Other (comments)     Positive history of HIT    Iodinated Contrast Media Other (comments)     Because of renal disease. No rash or hives per patient report 1/14/2012         Review of Systems   Review of Systems   Constitutional: Positive for chills and fever. HENT: Negative for rhinorrhea and sore throat. Respiratory: Positive for shortness of breath. Negative for cough, chest tightness and wheezing. Cardiovascular: Positive for leg swelling. Negative for chest pain and palpitations. Gastrointestinal: Negative for abdominal pain, diarrhea, nausea and vomiting. Genitourinary: Positive for decreased urine volume (  Does not urinate). Musculoskeletal: Positive for back pain and myalgias.  Negative for neck pain. Skin: Negative for rash and wound. Neurological: Negative for dizziness, syncope and light-headedness. Psychiatric/Behavioral: Negative for confusion. The patient is not nervous/anxious. All other systems reviewed and are negative. Physical Exam   General appearance - well nourished, well appearing, and in no distress  Eyes - pupils equal and reactive, extraocular eye movements intact  ENT - mucous membranes moist, pharynx normal without lesions  Neck - supple, no significant adenopathy; non-tender to palpation  Chest - clear to auscultation, no wheezes, rales or rhonchi; non-tender to palpation  Heart -cardiac, regular rhythm, S1 and S2 normal, no murmurs noted  Abdomen - soft, large ventral hernia and mildly tender, no rebound or guarding nondistended, no masses or organomegaly  Musculoskeletal - no joint tenderness, deformity or swelling; normal ROM  Extremities - peripheral pulses normal, 2+ pitting edema bilateral lower extremities, dialysis cath right groin,dialysis fistula left upper arm  Skin - normal coloration and turgor, no rashes  Neurological - alert, oriented x3, normal speech, no focal findings or movement disorder noted    Diagnostic Study Results     Labs -     Recent Results (from the past 12 hour(s))   EKG, 12 LEAD, INITIAL    Collection Time: 10/20/20  8:32 PM   Result Value Ref Range    Ventricular Rate 106 BPM    Atrial Rate 106 BPM    P-R Interval 172 ms    QRS Duration 102 ms    Q-T Interval 362 ms    QTC Calculation (Bezet) 480 ms    Calculated P Axis 64 degrees    Calculated R Axis 66 degrees    Calculated T Axis -63 degrees    Diagnosis       Sinus tachycardia  ST & T wave abnormality, consider inferolateral ischemia  When compared with ECG of 10-SEP-2020 15:55,  No significant change was found         Radiologic Studies -   XR CHEST PORT   Final Result   IMPRESSION:   No acute process.         CT Results  (Last 48 hours)    None        CXR Results  (Last 48 hours)    None            Medical Decision Making   I am the first provider for this patient. I reviewed the vital signs, available nursing notes, past medical history, past surgical history, family history and social history. Vital Signs-Reviewed the patient's vital signs. Patient Vitals for the past 12 hrs:   Temp Pulse Resp BP SpO2   10/20/20 2243 (!) 100.8 °F (38.2 °C)       10/20/20 2212     100 %   10/20/20 2209 100.1 °F (37.8 °C) (!) 103 18 125/84 100 %   10/20/20 2027 (!) 100.5 °F (38.1 °C) (!) 106 18 (!) 142/94 100 %       EKG: Sinus tachycardia, 106 bpm, normal axis, normal NH, QRS, QTc intervals, T wave inversions inferior lateral leads (not markedly changed from September 10, 2020)    Records Reviewed: Nursing Notes and Old Medical Records    Provider Notes (Medical Decision Making):   Differential diagnosis: Pneumonia, viral syndrome, bacteremia  We will check CBC, CMP, lactate, chest x-ray, and will send COVID-19 swab, flu swab, and blood cultures    ED Course:   Initial assessment performed. The patients presenting problems have been discussed, and they are in agreement with the care plan formulated and outlined with them. I have encouraged them to ask questions as they arise throughout their visit. Progress Notes:     Patient with low-grade fever that has resolved in the ED with  vague myalgias. White blood cell count is normal, lactate is normal, flu test is negative and chest x-ray is unremarkable. Discussed at length with patient that he is at risk for bacteremia given his dialysis catheter in place. Instructed him to return to ED for worsening symptoms and to call his nephrologist tomorrow for further instructions and to see if antibiotics would be needed at his next dialysis. Disposition:  Discharge home  PLAN:  1. Discharge Medication List as of 10/21/2020  2:25 AM        2.    Follow-up Information     Follow up With Specialties Details Why Contact Info    [facility] EMERGENCY DEPT Emergency Medicine  If symptoms worsen [address] [place] [address]  [phone]    Tracy Garcia MD Family Medicine Schedule an appointment as soon as possible for a visit  [facility]  [phone]          Return to ED if worse     Diagnosis     Clinical Impression:   1. ESRD (end stage renal disease) on dialysis ([facility]    2. Viral syndrome    3.  Person under investigation for COVID-19

## 2020-10-21 NOTE — ED NOTES
Patient was provided with discharge instructions. Instructions and any medications were reviewed with the patient &/or family by Dr. Maritza Sierra. Questions and concerns addressed by the provider. Patient discharged in stable condition via Iris, RN and was wheeled out to the waiting area to wait for his ride.

## 2020-10-21 NOTE — DISCHARGE INSTRUCTIONS
Patient Education        Viral Infections: Care Instructions  Your Care Instructions     You don't feel well, but it's not clear what's causing it. You may have a viral infection. Viruses cause many illnesses, such as the common cold, influenza, fever, rashes, and the diarrhea, nausea, and vomiting that are often called \"stomach flu. \" You may wonder if antibiotic medicines could make you feel better. But antibiotics only treat infections caused by bacteria. They don't work on viruses. The good news is that viral infections usually aren't serious. Most will go away in a few days without medical treatment. In the meantime, there are a few things you can do to make yourself more comfortable. Follow-up care is a key part of your treatment and safety. Be sure to make and go to all appointments, and call your doctor if you are having problems. It's also a good idea to know your test results and keep a list of the medicines you take. How can you care for yourself at home? · Get plenty of rest if you feel tired. · Take an over-the-counter pain medicine if needed, such as acetaminophen (Tylenol), ibuprofen (Advil, Motrin), or naproxen (Aleve). Read and follow all instructions on the label. · Be careful when taking over-the-counter cold or flu medicines and Tylenol at the same time. Many of these medicines have acetaminophen, which is Tylenol. Read the labels to make sure that you are not taking more than the recommended dose. Too much acetaminophen (Tylenol) can be harmful. · Drink plenty of fluids, enough so that your urine is light yellow or clear like water. If you have kidney, heart, or liver disease and have to limit fluids, talk with your doctor before you increase the amount of fluids you drink. · Stay home from work, school, and other public places while you have a fever. When should you call for help? Call 911 anytime you think you may need emergency care.  For example, call if:    · You have severe trouble breathing.     · You passed out (lost consciousness). Call your doctor now or seek immediate medical care if:    · You seem to be getting much sicker.     · You have a new or higher fever.     · You have blood in your stools.     · You have new belly pain, or your pain gets worse.     · You have a new rash. Watch closely for changes in your health, and be sure to contact your doctor if:    · You start to get better and then get worse.     · You do not get better as expected. Where can you learn more? Go to http://www.gray.com/  Enter L906 in the search box to learn more about \"Viral Infections: Care Instructions. \"  Current as of: February 11, 2020               Content Version: 12.6  © 6027-9714 [employer]. Care instructions adapted under license by [employer] (which disclaims liability or warranty for this information). If you have questions about a medical condition or this instruction, always ask your healthcare professional. [address] any warranty or liability for your use of this information.

## 2020-10-21 NOTE — ED NOTES
Pt states he began w/ a 100.5 fever this am. Lower back pain.  Last dialysis was Sunday, bilateral kidney transplant 2001

## 2020-10-23 NOTE — PROGRESS NOTES
Pt contacted re: +blood cultures x 4. Pt was not sent home with antibiotics and states the pain is now in his back and he is having difficulty with ambulation. Encouraged him to return to the hospital for re-evaluation given the blood cultures. Good understanding noted.

## 2020-10-24 LAB
BACTERIA SPEC CULT: ABNORMAL
SERVICE CMNT-IMP: ABNORMAL

## 2020-10-29 ENCOUNTER — APPOINTMENT (OUTPATIENT)
Dept: CT IMAGING | Age: 45
DRG: 314 | End: 2020-10-29
Attending: EMERGENCY MEDICINE
Payer: MEDICARE

## 2020-10-29 ENCOUNTER — APPOINTMENT (OUTPATIENT)
Dept: INTERVENTIONAL RADIOLOGY/VASCULAR | Age: 45
DRG: 314 | End: 2020-10-29
Attending: INTERNAL MEDICINE
Payer: MEDICARE

## 2020-10-29 ENCOUNTER — HOSPITAL ENCOUNTER (INPATIENT)
Age: 45
LOS: 12 days | Discharge: HOME | DRG: 314 | End: 2020-11-10
Attending: EMERGENCY MEDICINE | Admitting: INTERNAL MEDICINE
Payer: MEDICARE

## 2020-10-29 DIAGNOSIS — I25.10 CORONARY ARTERY DISEASE INVOLVING NATIVE HEART, ANGINA PRESENCE UNSPECIFIED, UNSPECIFIED VESSEL OR LESION TYPE: ICD-10-CM

## 2020-10-29 DIAGNOSIS — N18.6 ESRD (END STAGE RENAL DISEASE) (HCC): ICD-10-CM

## 2020-10-29 DIAGNOSIS — B95.61 STAPHYLOCOCCUS AUREUS BACTEREMIA: ICD-10-CM

## 2020-10-29 DIAGNOSIS — Z99.2 DIALYSIS PATIENT (HCC): ICD-10-CM

## 2020-10-29 DIAGNOSIS — R78.81 BACTEREMIA: ICD-10-CM

## 2020-10-29 DIAGNOSIS — Z99.2 ESRD (END STAGE RENAL DISEASE) ON DIALYSIS (HCC): Chronic | ICD-10-CM

## 2020-10-29 DIAGNOSIS — R78.81 STAPHYLOCOCCUS AUREUS BACTEREMIA: ICD-10-CM

## 2020-10-29 DIAGNOSIS — I10 ESSENTIAL HYPERTENSION: Chronic | ICD-10-CM

## 2020-10-29 DIAGNOSIS — B95.7 BACTEREMIA DUE TO METHICILLIN RESISTANT STAPHYLOCOCCUS EPIDERMIDIS: ICD-10-CM

## 2020-10-29 DIAGNOSIS — Z16.29 BACTEREMIA DUE TO METHICILLIN RESISTANT STAPHYLOCOCCUS EPIDERMIDIS: ICD-10-CM

## 2020-10-29 DIAGNOSIS — E78.00 HIGH CHOLESTEROL: ICD-10-CM

## 2020-10-29 DIAGNOSIS — M46.40 DISCITIS, UNSPECIFIED SPINAL REGION: ICD-10-CM

## 2020-10-29 DIAGNOSIS — M54.50 ACUTE MIDLINE LOW BACK PAIN WITHOUT SCIATICA: ICD-10-CM

## 2020-10-29 DIAGNOSIS — R78.81 BACTEREMIA DUE TO METHICILLIN RESISTANT STAPHYLOCOCCUS EPIDERMIDIS: ICD-10-CM

## 2020-10-29 DIAGNOSIS — E87.5 ACUTE HYPERKALEMIA: Primary | ICD-10-CM

## 2020-10-29 DIAGNOSIS — A41.9 LINE SEPSIS ASSOCIATED WITH DIALYSIS CATHETER (HCC): ICD-10-CM

## 2020-10-29 DIAGNOSIS — R93.7 ABNORMAL CT SCAN, LUMBAR SPINE: ICD-10-CM

## 2020-10-29 DIAGNOSIS — N18.6 ESRD (END STAGE RENAL DISEASE) ON DIALYSIS (HCC): Chronic | ICD-10-CM

## 2020-10-29 DIAGNOSIS — N30.90 CYSTITIS: ICD-10-CM

## 2020-10-29 DIAGNOSIS — T82.7XXA LINE SEPSIS ASSOCIATED WITH DIALYSIS CATHETER (HCC): ICD-10-CM

## 2020-10-29 DIAGNOSIS — Z95.810 AICD (AUTOMATIC CARDIOVERTER/DEFIBRILLATOR) PRESENT: Chronic | ICD-10-CM

## 2020-10-29 LAB
ALBUMIN SERPL-MCNC: 3.4 G/DL (ref 3.5–5)
ALBUMIN/GLOB SERPL: 0.7 {RATIO} (ref 1.1–2.2)
ALP SERPL-CCNC: 200 U/L (ref 45–117)
ALT SERPL-CCNC: 28 U/L (ref 12–78)
ANION GAP SERPL CALC-SCNC: 13 MMOL/L (ref 5–15)
AST SERPL-CCNC: 16 U/L (ref 15–37)
ATRIAL RATE: 93 BPM
BASOPHILS # BLD: 0.1 K/UL (ref 0–0.1)
BASOPHILS NFR BLD: 1 % (ref 0–1)
BILIRUB SERPL-MCNC: 0.5 MG/DL (ref 0.2–1)
BUN SERPL-MCNC: 67 MG/DL (ref 6–20)
BUN/CREAT SERPL: 6 (ref 12–20)
CALCIUM SERPL-MCNC: 7.2 MG/DL (ref 8.5–10.1)
CALCULATED P AXIS, ECG09: 60 DEGREES
CALCULATED R AXIS, ECG10: 62 DEGREES
CALCULATED T AXIS, ECG11: -71 DEGREES
CHLORIDE SERPL-SCNC: 95 MMOL/L (ref 97–108)
CO2 SERPL-SCNC: 23 MMOL/L (ref 21–32)
CREAT SERPL-MCNC: 11.1 MG/DL (ref 0.7–1.3)
DIAGNOSIS, 93000: NORMAL
DIFFERENTIAL METHOD BLD: ABNORMAL
EOSINOPHIL # BLD: 0.2 K/UL (ref 0–0.4)
EOSINOPHIL NFR BLD: 2 % (ref 0–7)
ERYTHROCYTE [DISTWIDTH] IN BLOOD BY AUTOMATED COUNT: 17.9 % (ref 11.5–14.5)
GLOBULIN SER CALC-MCNC: 4.7 G/DL (ref 2–4)
GLUCOSE SERPL-MCNC: 77 MG/DL (ref 65–100)
HCT VFR BLD AUTO: 27.4 % (ref 36.6–50.3)
HGB BLD-MCNC: 9.1 G/DL (ref 12.1–17)
IMM GRANULOCYTES # BLD AUTO: 0.1 K/UL (ref 0–0.04)
IMM GRANULOCYTES NFR BLD AUTO: 1 % (ref 0–0.5)
INR PPP: 1.2 (ref 0.9–1.1)
LIPASE SERPL-CCNC: 227 U/L (ref 73–393)
LYMPHOCYTES # BLD: 1.2 K/UL (ref 0.8–3.5)
LYMPHOCYTES NFR BLD: 10 % (ref 12–49)
MCH RBC QN AUTO: 32.2 PG (ref 26–34)
MCHC RBC AUTO-ENTMCNC: 33.2 G/DL (ref 30–36.5)
MCV RBC AUTO: 96.8 FL (ref 80–99)
MONOCYTES # BLD: 0.8 K/UL (ref 0–1)
MONOCYTES NFR BLD: 7 % (ref 5–13)
NEUTS SEG # BLD: 9.6 K/UL (ref 1.8–8)
NEUTS SEG NFR BLD: 79 % (ref 32–75)
NRBC # BLD: 0 K/UL (ref 0–0.01)
NRBC BLD-RTO: 0 PER 100 WBC
P-R INTERVAL, ECG05: 172 MS
PLATELET # BLD AUTO: 289 K/UL (ref 150–400)
PMV BLD AUTO: 11 FL (ref 8.9–12.9)
POTASSIUM SERPL-SCNC: 6.5 MMOL/L (ref 3.5–5.1)
PROT SERPL-MCNC: 8.1 G/DL (ref 6.4–8.2)
PROTHROMBIN TIME: 12.7 SEC (ref 9–11.1)
Q-T INTERVAL, ECG07: 412 MS
QRS DURATION, ECG06: 104 MS
QTC CALCULATION (BEZET), ECG08: 512 MS
RBC # BLD AUTO: 2.83 M/UL (ref 4.1–5.7)
SODIUM SERPL-SCNC: 131 MMOL/L (ref 136–145)
VENTRICULAR RATE, ECG03: 93 BPM
WBC # BLD AUTO: 12 K/UL (ref 4.1–11.1)

## 2020-10-29 PROCEDURE — 74011250637 HC RX REV CODE- 250/637: Performed by: INTERNAL MEDICINE

## 2020-10-29 PROCEDURE — 36415 COLL VENOUS BLD VENIPUNCTURE: CPT

## 2020-10-29 PROCEDURE — 74011000636 HC RX REV CODE- 636: Performed by: EMERGENCY MEDICINE

## 2020-10-29 PROCEDURE — 2709999900 HC NON-CHARGEABLE SUPPLY

## 2020-10-29 PROCEDURE — 87040 BLOOD CULTURE FOR BACTERIA: CPT

## 2020-10-29 PROCEDURE — 74011000250 HC RX REV CODE- 250: Performed by: EMERGENCY MEDICINE

## 2020-10-29 PROCEDURE — 90935 HEMODIALYSIS ONE EVALUATION: CPT

## 2020-10-29 PROCEDURE — 96374 THER/PROPH/DIAG INJ IV PUSH: CPT

## 2020-10-29 PROCEDURE — 85610 PROTHROMBIN TIME: CPT

## 2020-10-29 PROCEDURE — 36589 REMOVAL TUNNELED CV CATH: CPT

## 2020-10-29 PROCEDURE — 87205 SMEAR GRAM STAIN: CPT

## 2020-10-29 PROCEDURE — 74011636637 HC RX REV CODE- 636/637: Performed by: EMERGENCY MEDICINE

## 2020-10-29 PROCEDURE — 96375 TX/PRO/DX INJ NEW DRUG ADDON: CPT

## 2020-10-29 PROCEDURE — 99285 EMERGENCY DEPT VISIT HI MDM: CPT

## 2020-10-29 PROCEDURE — 5A1D70Z PERFORMANCE OF URINARY FILTRATION, INTERMITTENT, LESS THAN 6 HOURS PER DAY: ICD-10-PCS | Performed by: INTERNAL MEDICINE

## 2020-10-29 PROCEDURE — 06PY33Z REMOVAL OF INFUSION DEVICE FROM LOWER VEIN, PERCUTANEOUS APPROACH: ICD-10-PCS | Performed by: RADIOLOGY

## 2020-10-29 PROCEDURE — 83605 ASSAY OF LACTIC ACID: CPT

## 2020-10-29 PROCEDURE — 85025 COMPLETE CBC W/AUTO DIFF WBC: CPT

## 2020-10-29 PROCEDURE — 65660000001 HC RM ICU INTERMED STEPDOWN

## 2020-10-29 PROCEDURE — 80053 COMPREHEN METABOLIC PANEL: CPT

## 2020-10-29 PROCEDURE — 74177 CT ABD & PELVIS W/CONTRAST: CPT

## 2020-10-29 PROCEDURE — 93005 ELECTROCARDIOGRAM TRACING: CPT

## 2020-10-29 PROCEDURE — 74011250636 HC RX REV CODE- 250/636: Performed by: INTERNAL MEDICINE

## 2020-10-29 PROCEDURE — 74011250636 HC RX REV CODE- 250/636: Performed by: EMERGENCY MEDICINE

## 2020-10-29 PROCEDURE — 83690 ASSAY OF LIPASE: CPT

## 2020-10-29 RX ORDER — ACETAMINOPHEN 325 MG/1
650 TABLET ORAL
Status: DISCONTINUED | OUTPATIENT
Start: 2020-10-29 | End: 2020-10-30 | Stop reason: SDUPTHER

## 2020-10-29 RX ORDER — SEVELAMER CARBONATE 800 MG/1
1600 TABLET, FILM COATED ORAL
Status: DISCONTINUED | OUTPATIENT
Start: 2020-10-30 | End: 2020-10-30

## 2020-10-29 RX ORDER — MORPHINE SULFATE 2 MG/ML
1-2 INJECTION, SOLUTION INTRAMUSCULAR; INTRAVENOUS
Status: DISCONTINUED | OUTPATIENT
Start: 2020-10-29 | End: 2020-11-10 | Stop reason: HOSPADM

## 2020-10-29 RX ORDER — KETOROLAC TROMETHAMINE 30 MG/ML
15 INJECTION, SOLUTION INTRAMUSCULAR; INTRAVENOUS
Status: COMPLETED | OUTPATIENT
Start: 2020-10-29 | End: 2020-10-29

## 2020-10-29 RX ORDER — CALCITRIOL 0.25 UG/1
0.5 CAPSULE ORAL DAILY
Status: DISCONTINUED | OUTPATIENT
Start: 2020-10-30 | End: 2020-11-10 | Stop reason: HOSPADM

## 2020-10-29 RX ORDER — FENTANYL CITRATE 50 UG/ML
50 INJECTION, SOLUTION INTRAMUSCULAR; INTRAVENOUS
Status: DISPENSED | OUTPATIENT
Start: 2020-10-29 | End: 2020-10-30

## 2020-10-29 RX ORDER — ATORVASTATIN CALCIUM 20 MG/1
20 TABLET, FILM COATED ORAL DAILY
COMMUNITY

## 2020-10-29 RX ORDER — METOPROLOL SUCCINATE 25 MG/1
25 TABLET, EXTENDED RELEASE ORAL DAILY
Status: ON HOLD | COMMUNITY
End: 2020-11-10 | Stop reason: SDUPTHER

## 2020-10-29 RX ORDER — GUAIFENESIN 100 MG/5ML
81 LIQUID (ML) ORAL DAILY
Status: DISCONTINUED | OUTPATIENT
Start: 2020-10-30 | End: 2020-11-10 | Stop reason: HOSPADM

## 2020-10-29 RX ORDER — SODIUM CHLORIDE 0.9 % (FLUSH) 0.9 %
10 SYRINGE (ML) INJECTION
Status: COMPLETED | OUTPATIENT
Start: 2020-10-29 | End: 2020-10-29

## 2020-10-29 RX ORDER — PANTOPRAZOLE SODIUM 40 MG/1
40 TABLET, DELAYED RELEASE ORAL
Status: DISCONTINUED | OUTPATIENT
Start: 2020-10-30 | End: 2020-11-10 | Stop reason: HOSPADM

## 2020-10-29 RX ORDER — WARFARIN 2.5 MG/1
2.5 TABLET ORAL DAILY
COMMUNITY
End: 2021-12-24

## 2020-10-29 RX ORDER — ONDANSETRON 2 MG/ML
4 INJECTION INTRAMUSCULAR; INTRAVENOUS
Status: DISCONTINUED | OUTPATIENT
Start: 2020-10-29 | End: 2020-11-10 | Stop reason: HOSPADM

## 2020-10-29 RX ORDER — CALCIUM GLUCONATE 94 MG/ML
1 INJECTION, SOLUTION INTRAVENOUS
Status: COMPLETED | OUTPATIENT
Start: 2020-10-29 | End: 2020-10-29

## 2020-10-29 RX ORDER — TRAMADOL HYDROCHLORIDE 50 MG/1
50 TABLET ORAL
Status: DISCONTINUED | OUTPATIENT
Start: 2020-10-29 | End: 2020-11-10 | Stop reason: HOSPADM

## 2020-10-29 RX ORDER — ACETAMINOPHEN 500 MG
500 TABLET ORAL
Status: DISCONTINUED | OUTPATIENT
Start: 2020-10-29 | End: 2020-11-10 | Stop reason: HOSPADM

## 2020-10-29 RX ORDER — SODIUM BICARBONATE 1 MEQ/ML
50 SYRINGE (ML) INTRAVENOUS
Status: COMPLETED | OUTPATIENT
Start: 2020-10-29 | End: 2020-10-29

## 2020-10-29 RX ORDER — WARFARIN SODIUM 5 MG/1
5 TABLET ORAL
Status: DISCONTINUED | OUTPATIENT
Start: 2020-10-31 | End: 2020-10-29 | Stop reason: DRUGHIGH

## 2020-10-29 RX ORDER — WARFARIN 2.5 MG/1
2.5 TABLET ORAL
Status: DISCONTINUED | OUTPATIENT
Start: 2020-10-30 | End: 2020-10-29 | Stop reason: DRUGHIGH

## 2020-10-29 RX ORDER — CINACALCET 60 MG/1
60 TABLET, FILM COATED ORAL DAILY
COMMUNITY
End: 2020-11-10

## 2020-10-29 RX ORDER — DEXTROSE 50 % IN WATER (D50W) INTRAVENOUS SYRINGE
25
Status: COMPLETED | OUTPATIENT
Start: 2020-10-29 | End: 2020-10-29

## 2020-10-29 RX ORDER — WARFARIN SODIUM 5 MG/1
5 TABLET ORAL ONCE
Status: COMPLETED | OUTPATIENT
Start: 2020-10-30 | End: 2020-10-30

## 2020-10-29 RX ORDER — METOPROLOL SUCCINATE 25 MG/1
12.5 TABLET, EXTENDED RELEASE ORAL DAILY
Status: DISCONTINUED | OUTPATIENT
Start: 2020-10-30 | End: 2020-11-10 | Stop reason: HOSPADM

## 2020-10-29 RX ORDER — HYDROCODONE BITARTRATE AND ACETAMINOPHEN 10; 325 MG/1; MG/1
1 TABLET ORAL
Status: DISCONTINUED | OUTPATIENT
Start: 2020-10-29 | End: 2020-11-10 | Stop reason: HOSPADM

## 2020-10-29 RX ORDER — ONDANSETRON 2 MG/ML
4 INJECTION INTRAMUSCULAR; INTRAVENOUS
Status: COMPLETED | OUTPATIENT
Start: 2020-10-29 | End: 2020-10-29

## 2020-10-29 RX ADMIN — CALCIUM GLUCONATE 1 G: 98 INJECTION, SOLUTION INTRAVENOUS at 14:37

## 2020-10-29 RX ADMIN — SODIUM BICARBONATE 50 MEQ: 84 INJECTION, SOLUTION INTRAVENOUS at 14:48

## 2020-10-29 RX ADMIN — KETOROLAC TROMETHAMINE 15 MG: 30 INJECTION, SOLUTION INTRAMUSCULAR at 12:51

## 2020-10-29 RX ADMIN — HYDROCODONE BITARTRATE AND ACETAMINOPHEN 1 TABLET: 10; 325 TABLET ORAL at 18:03

## 2020-10-29 RX ADMIN — EPOETIN ALFA-EPBX 10000 UNITS: 10000 INJECTION, SOLUTION INTRAVENOUS; SUBCUTANEOUS at 23:14

## 2020-10-29 RX ADMIN — Medication 10 ML: at 13:12

## 2020-10-29 RX ADMIN — VANCOMYCIN HYDROCHLORIDE 1000 MG: 1 INJECTION, POWDER, LYOPHILIZED, FOR SOLUTION INTRAVENOUS at 22:06

## 2020-10-29 RX ADMIN — DEXTROSE MONOHYDRATE 25 G: 25 INJECTION, SOLUTION INTRAVENOUS at 14:49

## 2020-10-29 RX ADMIN — HUMAN INSULIN 5 UNITS: 100 INJECTION, SOLUTION SUBCUTANEOUS at 14:54

## 2020-10-29 RX ADMIN — MORPHINE SULFATE 2 MG: 2 INJECTION, SOLUTION INTRAMUSCULAR; INTRAVENOUS at 22:03

## 2020-10-29 RX ADMIN — ONDANSETRON 4 MG: 2 INJECTION INTRAMUSCULAR; INTRAVENOUS at 12:39

## 2020-10-29 RX ADMIN — IOPAMIDOL 100 ML: 755 INJECTION, SOLUTION INTRAVENOUS at 13:12

## 2020-10-29 NOTE — PROGRESS NOTES
Pharmacy Clarification of the Prior to Admission Medication Regimen Retrospective to the Admission Medication Reconciliation    The patient was interviewed regarding clarification of the prior to admission medication regimen. Patient was questioned regarding use of any other inhalers, topical products, over the counter medications, herbal medications, vitamin products or ophthalmic/nasal/otic medication use. Information Obtained From: query, patient    Recommendations/Findings: The following amendments were made to the patient's active medication list on file at [address]:     1) Additions:   Lipitor  Cinacalcet      2) Removals:   Zofran ODT  Warfarin 5mg      3) Changes:  Metroprolol (Old regimen: 12.5 mg /New regimen: 25 mg)      4) Pertinent Pharmacy Findings:  Updated patients preferred outpatient pharmacy to: [facility]  Identified High Alert Medication Information  Current Anticoagulants:  Name: Warfarin       PTA medication list was corrected to the following:     Prior to Admission Medications   Prescriptions Last Dose Informant Taking?   acetaminophen (TYLENOL) 500 mg tablet 10/29/2020 at Unknown time Self Yes   Sig: Take 1 Tab by mouth every four (4) hours as needed for Pain. Over the counter   aspirin 81 mg chewable tablet 10/29/2020 at Unknown time Self Yes   Sig: Take 81 mg by mouth daily. atorvastatin (Lipitor) 20 mg tablet 10/28/2020 at Unknown time Self Yes   Sig: Take 20 mg by mouth daily. calcitRIOL (ROCALTROL) 0.5 mcg capsule 10/28/2020 at Unknown time Self Yes   Sig: Take 0.5 mcg by mouth daily. cinacalcet 60 mg tab 10/28/2020 at Unknown time Self Yes   Sig: Take 60 mg by mouth daily. metoprolol succinate (TOPROL-XL) 25 mg XL tablet 10/28/2020 at Unknown time Self Yes   Sig: Take 25 mg by mouth daily. omeprazole (PRILOSEC) 20 mg capsule 10/29/2020 at Unknown time Self Yes   Sig: Take 20 mg by mouth daily.    sevelamer carbonate (RENVELA) 800 mg tab tab 10/28/2020 at Unknown time Self Yes   Sig: Take 1,600 mg by mouth three (3) times daily (with meals). Only takes if eating a  meal   warfarin (COUMADIN) 2.5 mg tablet 10/28/2020 at Unknown time Self Yes   Sig: Take 2.5 mg by mouth daily.       Facility-Administered Medications: None          Thank you,  Millie Elkins CPHT  Medication History Pharmacy Technician

## 2020-10-29 NOTE — CONSULTS
Blaise Leblanc         NAME:Claude Rankin Grieves MBA:[id number]   :1975     Pt seen and examined  STAT consult for K: 6.5  ESRD on home HD  Staph epidermidis Bacteremia( pt was called on 10/23 to return to the hospital but only returned today)  Has R femoral HD cath(possible source of bacteremia)=> place order to remove  HD today through left AVF ( [facility] notified) and follow TTS schedule while inpt    Full note to follow                Clare Hutchinson, [address] [facility]  [facility]  [address], [place], [address] [facility]  Phone - [phone]         Fax - [phone] [facility] Office  [address] [place] [address]  Phone - [phone]        Fax - [phone]     [email or web address]

## 2020-10-29 NOTE — H&P
Hospitalist Admission Note    NAME: Ruddy Yusuf   :  1975   MRN:  [id number]     Date/Time:  10/29/2020 2:51 PM    Patient PCP: Danie Solitario MD  ______________________________________________________________________  Given the patient's current clinical presentation, I have a high level of concern for decompensation if discharged from the emergency department. Complex decision making was performed, which includes reviewing the patient's available past medical records, laboratory results, and x-ray films. My assessment of this patient's clinical condition and my plan of care is as follows. Assessment / Plan:  Sepsis with Staph epidermidis Bacteremia 10/20/20. pt was called on 10/23 to return to the hospital but only returned today 10/29/2020. Has R femoral HD cath for home dialysis which is now no longer needed. Pt was non compliant and didn't follow up to remove. in ER Blood cultures re-taken, line removed. Nausea/vomiting from elevated bun. Zofran, dialysis    H/o left leg dvt and s/p ivc and history of HIT  on warfarin. H/o ESRD and 2 failed transplants, now with hyperkalemia. Calcium/insulin given in ER. Continue telemetry monitoring. Appreciate nephro. H/o cad with stents, v tach, and aicd palacement    gerd    Thrombocytopenia    htn    Code Status: full  Surrogate Decision Maker:   Aliyah Meadows     Sister Home Ph:  Work Ph:  Mobile Ph: [phone]         DVT Prophylaxis: warfarin  GI Prophylaxis: not indicated    Baseline: independent      Subjective:   CHIEF COMPLAINT: back pain, fever, nausea/vomiting. HISTORY OF PRESENT ILLNESS:     Jenny Munguia is a 39 y.o.  male who presents with above for a day. He also did not complete dialysis due to clot in dialysis unit. He presents to ER for fever/back pain/incomplete dialysis. In ER, his potassium was elevated, and right femoral line was removed.      Pt was in ER last week with fever/chills/myalgias with blood cultures eventually growing staph epidermis, sensitive to vanco. Pt had a right femoral cath placed in sept 2020 for home dialysis on tues and thurs. Since this was no longer needed he was asked to return to hospital to have it removed, however, he failed to do so. Currently back pain is dull achey, does not radiate, nothing makes it worse or better, and progressively became worse over the day. Pt felt feverish yesterday one time. We were asked to admit for work up and evaluation of the above problems. Past Medical History:   Diagnosis Date    Abdominal hematoma     AICD (automatic cardioverter/defibrillator) present     CAD (coronary artery disease)     anterior MI s/p 2 stents  2007    Chronic abdominal pain     Chronic kidney disease     ESRD; Dialysis dependent.  Home [facility] does labs- [facility] tpke    DVT (deep venous thrombosis) ([address] 2001    DVT of popliteal vein ([address] 11/2011    not anticoagulated due to bleeding, IVC filter    Endocarditis     Gallstone pancreatitis     Gastrointestinal disorder     acid reflux    Gastrointestinal disorder     peptic ulcer    Hemodialysis patient ([address]     High cholesterol     Hypertension     Kidney transplant     b/l kidneys 1995, 2001    Long term current use of anticoagulant therapy     Nephrotic syndrome     Other ill-defined conditions(799.89)     kidny transplant x2,  on dialysis    Other ill-defined conditions(799.89)     home dialysis    Other ill-defined conditions(799.89)     hx recurrent left leg DVT    Peritonitis ([address]     Seizures ([address] 2015    most recent- only had three in lifetime    Small bowel obstruction (HCC)     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    V-tach [facility])         Past Surgical History:   Procedure Laterality Date    CARDIAC SURG PROCEDURE UNLIST  2007    2 stents    HX APPENDECTOMY      HX CHOLECYSTECTOMY      HX OTHER SURGICAL cholecystectomy    HX OTHER SURGICAL  11/2011    exploratory laparotomy    HX OTHER SURGICAL      fem-pop    HX OTHER SURGICAL  02/2013    right upper extremity fistula    HX PACEMAKER Left 6/2009    AICD-st latonya-not connected    HX PACEMAKER Left     AICD-left side-BS-working    HX SMALL BOWEL RESECTION      HX TRANSPLANT  2001     Kidney    HX TRANSPLANT  1992    kidney    HX UROLOGICAL      2 kidney transplants    HX VASCULAR ACCESS Right     femoral access for HD- does 5 day dialysis @ home    IR REPLACE CVC TUNNELED W/O PORT  9/10/2020    RI EDG US EXAM SURGICAL ALTER STOM DUODENUM/JEJUNUM  7/15/2011         RI ESOPHAGOGASTRODUODENOSCOPY TRANSORAL DIAGNOSTIC  5/24/2012         VASCULAR SURGERY PROCEDURE UNLIST  11/14/2017    Insertion AV graft right upper arm (bovine); ligation of AV fistula right arm       Social History     Tobacco Use    Smoking status: Never Smoker    Smokeless tobacco: Never Used   Substance Use Topics    Alcohol use: No        Family History   Problem Relation Age of Onset    COPD Mother     Lung Disease Mother     Cancer Father         stomach    Cancer Maternal Grandmother      Allergies   Allergen Reactions    Shellfish Containing Products Swelling     Break out in rash, trouble breathing    Heparin Analogues Other (comments)     Positive history of HIT        Prior to Admission medications    Medication Sig Start Date End Date Taking? Authorizing Provider   ondansetron (ZOFRAN ODT) 4 mg disintegrating tablet Take 1 Tab by mouth every eight (8) hours as needed. 1/20/19   Bibiana Erickson MD   acetaminophen (TYLENOL) 500 mg tablet Take 1 Tab by mouth every four (4) hours as needed for Pain. Over the counter 1/20/19   Bibinaa Erickson MD   sevelamer carbonate (RENVELA) 800 mg tab tab Take 1,600 mg by mouth three (3) times daily (with meals).  Only takes if eating a  meal    Provider, Historical   warfarin (COUMADIN) 2.5 mg tablet Take 2.5 mg by mouth five (5) days a week. Every day except Monday & wednesday    Provider, Historical   warfarin (COUMADIN) 5 mg tablet Take 5 mg by mouth two (2) days a week. Monday & wednesday    Provider, Historical   metoprolol succinate (TOPROL-XL) 25 mg XL tablet Take 12.5 mg by mouth daily. am    Provider, Historical   omeprazole (PRILOSEC) 20 mg capsule Take 20 mg by mouth daily. Provider, Historical   calcitRIOL (ROCALTROL) 0.5 mcg capsule Take 0.5 mcg by mouth daily. Provider, Historical   aspirin 81 mg chewable tablet Take 81 mg by mouth daily. Other, MD Morro       REVIEW OF SYSTEMS:     I am not able to complete the review of systems because:    The patient is intubated and sedated    The patient has altered mental status due to his acute medical problems    The patient has baseline aphasia from prior stroke(s)    The patient has baseline dementia and is not reliable historian    The patient is in acute medical distress and unable to provide information           Total of 12 systems reviewed as follows:       POSITIVE= underlined text  Negative = text not underlined  General:  fever, chills, sweats, generalized weakness, weight loss/gain,      loss of appetite   Eyes:    blurred vision, eye pain, loss of vision, double vision  ENT:    rhinorrhea, pharyngitis   Respiratory:   cough, sputum production, SOB, ZAMUDIO, wheezing, pleuritic pain   Cardiology:   chest pain, palpitations, orthopnea, PND, edema, syncope   Gastrointestinal:  abdominal pain , N/V, diarrhea, dysphagia, constipation, bleeding   Genitourinary:  frequency, urgency, dysuria, hematuria, incontinence   Muskuloskeletal :  arthralgia, myalgia, back pain  Hematology:  easy bruising, nose or gum bleeding, lymphadenopathy   Dermatological: rash, ulceration, pruritis, color change / jaundice  Endocrine:   hot flashes or polydipsia   Neurological:  headache, dizziness, confusion, focal weakness, paresthesia,     Speech difficulties, memory loss, gait difficulty  Psychological: Feelings of anxiety, depression, agitation    Objective:   VITALS:    Visit Vitals  /81   Pulse 89   Temp 98.6 °F (37 °C)   Resp 17   Ht 5' 7\" (1.702 m)   Wt 72.3 kg (159 lb 6.3 oz)   SpO2 100%   BMI 24.96 kg/m²       PHYSICAL EXAM:    General:    Alert, cooperative, no distress, appears stated age. Lungs:   coarse to auscultation bilaterally. No Wheezing or Rhonchi. No rales. Chest wall:  No tenderness  No Accessory muscle use. no stridor  Heart:   Regular  rhythm,  No  murmur  bl edema  Abdomen:   Soft, non-tender. Not distended. Bowel sounds normal  Extremities: No cyanosis. No clubbing,    Skin:     Not pale. Not Jaundiced  No rashes   Psych:  fair insight. Not depressed. Not anxious or agitated. Neurologic:  No facial asymmetry. No aphasia or slurred speech. Symmetrical strength, Sensation grossly intact    _______________________________________________________________________  Care Plan discussed with:    Comments   Patient x    Family      RN x    Care Manager                    Consultant:      _______________________________________________________________________  Expected  Disposition:   Home with Family x   HH/PT/OT/RN    SNF/LTC    KARLI    ________________________________________________________________________  TOTAL TIME: 54Minutes    Critical Care Provided     Minutes non procedure based      Comments    x Reviewed previous records   >50% of visit spent in counseling and coordination of care x Discussion with patient and/or family and questions answered       ________________________________________________________________________  Signed: Rachael Bullard DO    Procedures: see electronic medical records for all procedures/Xrays and details which were not copied into this note but were reviewed prior to creation of Plan.     LAB DATA REVIEWED:    Recent Results (from the past 24 hour(s))   CBC WITH AUTOMATED DIFF    Collection Time: 10/29/20 12:50 PM Result Value Ref Range    WBC 12.0 (H) 4.1 - 11.1 K/uL    RBC 2.83 (L) 4.10 - 5.70 M/uL    HGB 9.1 (L) 12.1 - 17.0 g/dL    HCT 27.4 (L) 36.6 - 50.3 %    MCV 96.8 80.0 - 99.0 FL    MCH 32.2 26.0 - 34.0 PG    MCHC 33.2 30.0 - 36.5 g/dL    RDW 17.9 (H) 11.5 - 14.5 %    PLATELET 477 654 - 651 K/uL    MPV 11.0 8.9 - 12.9 FL    NRBC 0.0 0  WBC    ABSOLUTE NRBC 0.00 0.00 - 0.01 K/uL    NEUTROPHILS 79 (H) 32 - 75 %    LYMPHOCYTES 10 (L) 12 - 49 %    MONOCYTES 7 5 - 13 %    EOSINOPHILS 2 0 - 7 %    BASOPHILS 1 0 - 1 %    IMMATURE GRANULOCYTES 1 (H) 0.0 - 0.5 %    ABS. NEUTROPHILS 9.6 (H) 1.8 - 8.0 K/UL    ABS. LYMPHOCYTES 1.2 0.8 - 3.5 K/UL    ABS. MONOCYTES 0.8 0.0 - 1.0 K/UL    ABS. EOSINOPHILS 0.2 0.0 - 0.4 K/UL    ABS. BASOPHILS 0.1 0.0 - 0.1 K/UL    ABS. IMM. GRANS. 0.1 (H) 0.00 - 0.04 K/UL    DF AUTOMATED     METABOLIC PANEL, COMPREHENSIVE    Collection Time: 10/29/20 12:50 PM   Result Value Ref Range    Sodium 131 (L) 136 - 145 mmol/L    Potassium 6.5 (H) 3.5 - 5.1 mmol/L    Chloride 95 (L) 97 - 108 mmol/L    CO2 23 21 - 32 mmol/L    Anion gap 13 5 - 15 mmol/L    Glucose 77 65 - 100 mg/dL    BUN 67 (H) 6 - 20 MG/DL    Creatinine 11.10 (H) 0.70 - 1.30 MG/DL    BUN/Creatinine ratio 6 (L) 12 - 20      GFR est AA 6 (L) >60 ml/min/1.73m2    GFR est non-AA 5 (L) >60 ml/min/1.73m2    Calcium 7.2 (L) 8.5 - 10.1 MG/DL    Bilirubin, total 0.5 0.2 - 1.0 MG/DL    ALT (SGPT) 28 12 - 78 U/L    AST (SGOT) 16 15 - 37 U/L    Alk.  phosphatase 200 (H) 45 - 117 U/L    Protein, total 8.1 6.4 - 8.2 g/dL    Albumin 3.4 (L) 3.5 - 5.0 g/dL    Globulin 4.7 (H) 2.0 - 4.0 g/dL    A-G Ratio 0.7 (L) 1.1 - 2.2     LIPASE    Collection Time: 10/29/20 12:50 PM   Result Value Ref Range    Lipase 227 73 - 393 U/L   EKG, 12 LEAD, INITIAL    Collection Time: 10/29/20  2:03 PM   Result Value Ref Range    Ventricular Rate 93 BPM    Atrial Rate 93 BPM    P-R Interval 172 ms    QRS Duration 104 ms    Q-T Interval 412 ms    QTC Calculation (Maricarmen) 512 ms    Calculated P Axis 60 degrees    Calculated R Axis 62 degrees    Calculated T Axis -71 degrees    Diagnosis       Normal sinus rhythm  Nonspecific T wave abnormality  Prolonged QT  When compared with ECG of 20-OCT-2020 20:32,  Nonspecific T wave abnormality has replaced inverted T waves in Inferior   leads  Nonspecific T wave abnormality has replaced inverted T waves in Lateral leads

## 2020-10-29 NOTE — ED NOTES
Pt IV access infiltrated during Sodium bicarbonate administration. Pt IV removed, Pt still to received remaining sodium bicarb, dextrose and Insulin. Spoke with MD Abel Srinivasan who confirmed it was okay to utilize the Right femoral dialysis catheter prior to its removal by IR. Remaining administration completed. Pt tolerated well. IV fentanyl not given per nephrologist request. Hospitalist at bedside informed of loss of acces and delay to admin vancomycin. Will attempt another site via ultrasound.

## 2020-10-29 NOTE — PROGRESS NOTES
TRANSFER - IN REPORT:    Verbal report received from [address] [place] [address] [place], RN on [facility]  being received from Emergency Department for routine progression of care      Report consisted of patients Situation, Background, Assessment and   Recommendations(SBAR). Information from the following report(s) SBAR, ED Summary, Intake/Output, MAR, Recent Results and Cardiac Rhythm NSR was reviewed with the receiving nurse. Opportunity for questions and clarification was provided. Assessment completed upon patients arrival to unit and care assumed.

## 2020-10-29 NOTE — DIALYSIS
[facility]  [phone]    Vitals   Pre   Post   Assessment   Pre   Post     Temp  Temp: 98.2 °F (36.8 °C) (10/29/20 1830)  98.4 LOC  A+Ox3 same   HR   Pulse (Heart Rate): 88 (10/29/20 1830) 81 Lungs   Diminished  same   B/P   BP: (!) 154/109 (10/29/20 1830) 112/101 Cardiac   RRR  same   Resp   Resp Rate: 18 (10/29/20 1830) 18 Skin   Warm/dry  same   Pain level  Pain Intensity 1: 0 (10/29/20 1824) 4 Edema    generalized   same   Orders:    Duration:   Start:    1536 End:    2200 Total:   3.5hr   Dialyzer:   Dialyzer/Set Up Inspection: Tanvir Newsome (10/29/20 1830)   K Bath:   Dialysate K (mEq/L): 2 (10/29/20 1830)   Ca Bath:   Dialysate CA (mEq/L): 2.5 (10/29/20 1830)   Na/Bicarb:   Dialysate NA (mEq/L): 140 (10/29/20 1830)   Target Fluid Removal:   Goal/Amount of Fluid to Remove (mL): 2000 mL (10/29/20 1830)   Access     Type & Location:   SUZANNE-AVG-W/ bruit/thrill. Site intact, no S+S of infection. Cannulated w/ 2x 15. [email or web address]   Labs     Obtained/Reviewed   Critical Results Called   Date when labs were drawn-  Hgb-    HGB   Date Value Ref Range Status   10/29/2020 9.1 (L) 12.1 - 17.0 g/dL Final     K-    Potassium   Date Value Ref Range Status   10/29/2020 6.5 (H) 3.5 - 5.1 mmol/L Final     Comment:     NO VISIBLE HEMOLYSIS     Ca-   Calcium   Date Value Ref Range Status   10/29/2020 7.2 (L) 8.5 - 10.1 MG/DL Final     Bun-   BUN   Date Value Ref Range Status   10/29/2020 67 (H) 6 - 20 MG/DL Final     Creat-   Creatinine   Date Value Ref Range Status   10/29/2020 11.10 (H) 0.70 - 1.30 MG/DL Final     Comment:     DIALYSIS PATIENT        Medications/ Blood Products Given     Name   Dose   Route and Time                     Blood Volume Processed (BVP):    84.6 Net Fluid   Removed:  2,000ml   Comments   Time Out Done: 2147  Primary Nurse Rpt Pre:Primary  Primary Nurse Rpt Post:Primary  Pt Education:Diet restrictions  Care Plan:Continue Tx as ordered  Tx Summary:Pt tolerated Tx well. All possible blood returned via NS rinseback. Needles pulled, sites secured, no excessifve bleeding. Admiting Diagnosis:  Pt's previous clinic-[facility]  Consent signed - Informed Consent Verified: Yes (10/29/20 2280)  Winter Consent - Signed  Hepatitis Status- Neg/Immune  Machine #- Machine Number: [id number] (10/29/20 1830)  Telemetry status-bedside/remote  Pre-dialysis wt. - Pre-Dialysis Weight: 72.3 kg (159 lb 6.3 oz) (10/29/20 1830)

## 2020-10-29 NOTE — ED PROVIDER NOTES
EMERGENCY DEPARTMENT HISTORY AND PHYSICAL EXAM      Date: 10/29/2020  Patient Name: Flip Dutta  Patient Age and Sex: 39 y.o. male     History of Presenting Illness     Chief Complaint   Patient presents with    Vomiting     Pt arrived to ED from home with vomiting since 5am.        History Provided By: Patient    HPI: Flip Dutta is a 82-year-old male with a history of end-stage renal disease on dialysis presenting with nausea, vomiting abdominal pain and back pain. Patient states that he was here a week ago with body aches, fever and shortness of breath. At that time had a fever and had blood cultures done as well as a Covid test which came back negative. He was discharged and was called the next day stating that his blood cultures were positive. When they called him he was having back pain and is still having some low back pain. Around 5 AM this morning started to have generalized abdominal pain worse in the epigastrium region as well as nausea and vomiting. He does not make any urine, denies any diarrhea, constipation or any fevers at home. Still continues to have myalgias. There are no other complaints, changes, or physical findings at this time. PCP: Daniel Katz MD    No current facility-administered medications on file prior to encounter. Current Outpatient Medications on File Prior to Encounter   Medication Sig Dispense Refill    ondansetron (ZOFRAN ODT) 4 mg disintegrating tablet Take 1 Tab by mouth every eight (8) hours as needed. 20 Tab 0    acetaminophen (TYLENOL) 500 mg tablet Take 1 Tab by mouth every four (4) hours as needed for Pain. Over the counter 30 Tab 0    sevelamer carbonate (RENVELA) 800 mg tab tab Take 1,600 mg by mouth three (3) times daily (with meals). Only takes if eating a  meal      warfarin (COUMADIN) 2.5 mg tablet Take 2.5 mg by mouth five (5) days a week.  Every day except Monday & wednesday      warfarin (COUMADIN) 5 mg tablet Take 5 mg by mouth two (2) days a week. Monday & wednesday      metoprolol succinate (TOPROL-XL) 25 mg XL tablet Take 12.5 mg by mouth daily. am      omeprazole (PRILOSEC) 20 mg capsule Take 20 mg by mouth daily.  calcitRIOL (ROCALTROL) 0.5 mcg capsule Take 0.5 mcg by mouth daily.  aspirin 81 mg chewable tablet Take 81 mg by mouth daily. Past History     Past Medical History:  Past Medical History:   Diagnosis Date    Abdominal hematoma     AICD (automatic cardioverter/defibrillator) present     CAD (coronary artery disease)     anterior MI s/p 2 stents  2007    Chronic abdominal pain     Chronic kidney disease     ESRD; Dialysis dependent.  Home [facility] does labs- [facility] tpke    DVT (deep venous thrombosis) ([address] 2001    DVT of popliteal vein ([address] 11/2011    not anticoagulated due to bleeding, IVC filter    Endocarditis     Gallstone pancreatitis     Gastrointestinal disorder     acid reflux    Gastrointestinal disorder     peptic ulcer    Hemodialysis patient ([address]     High cholesterol     Hypertension     Kidney transplant     b/l kidneys 1995, 2001    Long term current use of anticoagulant therapy     Nephrotic syndrome     Other ill-defined conditions(799.89)     kidny transplant x2,  on dialysis    Other ill-defined conditions(799.89)     home dialysis    Other ill-defined conditions(799.89)     hx recurrent left leg DVT    Peritonitis ([address]     Seizures ([address] 2015    most recent- only had three in lifetime    Small bowel obstruction (HCC)     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    V-tach [facility])        Past Surgical History:  Past Surgical History:   Procedure Laterality Date    CARDIAC SURG PROCEDURE UNLIST  2007    2 stents    HX APPENDECTOMY      HX CHOLECYSTECTOMY      HX OTHER SURGICAL      cholecystectomy    HX OTHER SURGICAL  11/2011    exploratory laparotomy    HX OTHER SURGICAL      fem-pop    HX OTHER SURGICAL  02/2013    right upper extremity fistula    HX PACEMAKER Left 6/2009    AICD-st latonya-not connected    HX PACEMAKER Left     AICD-left side-BS-working    HX SMALL BOWEL RESECTION      HX TRANSPLANT  2001     Kidney    HX TRANSPLANT  1992    kidney    HX UROLOGICAL      2 kidney transplants    HX VASCULAR ACCESS Right     femoral access for HD- does 5 day dialysis @ home    IR REPLACE CVC TUNNELED W/O PORT  9/10/2020    SD EDG US EXAM SURGICAL ALTER STOM DUODENUM/JEJUNUM  7/15/2011         SD ESOPHAGOGASTRODUODENOSCOPY TRANSORAL DIAGNOSTIC  5/24/2012         VASCULAR SURGERY PROCEDURE UNLIST  11/14/2017    Insertion AV graft right upper arm (bovine); ligation of AV fistula right arm       Family History:  Family History   Problem Relation Age of Onset    COPD Mother     Lung Disease Mother     Cancer Father         stomach    Cancer Maternal Grandmother        Social History:  Social History     Tobacco Use    Smoking status: Never Smoker    Smokeless tobacco: Never Used   Substance Use Topics    Alcohol use: No    Drug use: Yes     Types: Prescription, OTC       Allergies: Allergies   Allergen Reactions    Shellfish Containing Products Swelling     Break out in rash, trouble breathing    Heparin Analogues Other (comments)     Positive history of HIT         Review of Systems   Review of Systems   Constitutional: Positive for fatigue. Negative for chills and fever. Respiratory: Negative for cough and shortness of breath. Cardiovascular: Negative for chest pain. Gastrointestinal: Positive for abdominal pain, nausea and vomiting. Negative for constipation and diarrhea. Genitourinary: Negative for scrotal swelling and testicular pain. Musculoskeletal: Positive for back pain and myalgias. Neurological: Negative for weakness and numbness. All other systems reviewed and are negative. Physical Exam   Physical Exam  Vitals signs and nursing note reviewed.    Constitutional:       Appearance: He is well-developed. HENT:      Head: Normocephalic and atraumatic. Nose: Nose normal.      Mouth/Throat:      Mouth: Mucous membranes are moist.   Eyes:      Extraocular Movements: Extraocular movements intact. Conjunctiva/sclera: Conjunctivae normal.   Neck:      Musculoskeletal: Normal range of motion and neck supple. Cardiovascular:      Rate and Rhythm: Normal rate and regular rhythm. Pulmonary:      Effort: Pulmonary effort is normal. No respiratory distress. Breath sounds: Normal breath sounds. Abdominal:      General: There is no distension. Palpations: Abdomen is soft. Tenderness: There is no abdominal tenderness. Musculoskeletal: Normal range of motion. Skin:     General: Skin is warm and dry. Neurological:      General: No focal deficit present. Mental Status: He is alert and oriented to person, place, and time. Mental status is at baseline. Psychiatric:         Mood and Affect: Mood normal.          Diagnostic Study Results     Labs -     Recent Results (from the past 12 hour(s))   CBC WITH AUTOMATED DIFF    Collection Time: 10/29/20 12:50 PM   Result Value Ref Range    WBC 12.0 (H) 4.1 - 11.1 K/uL    RBC 2.83 (L) 4.10 - 5.70 M/uL    HGB 9.1 (L) 12.1 - 17.0 g/dL    HCT 27.4 (L) 36.6 - 50.3 %    MCV 96.8 80.0 - 99.0 FL    MCH 32.2 26.0 - 34.0 PG    MCHC 33.2 30.0 - 36.5 g/dL    RDW 17.9 (H) 11.5 - 14.5 %    PLATELET 511 228 - 906 K/uL    MPV 11.0 8.9 - 12.9 FL    NRBC 0.0 0  WBC    ABSOLUTE NRBC 0.00 0.00 - 0.01 K/uL    NEUTROPHILS 79 (H) 32 - 75 %    LYMPHOCYTES 10 (L) 12 - 49 %    MONOCYTES 7 5 - 13 %    EOSINOPHILS 2 0 - 7 %    BASOPHILS 1 0 - 1 %    IMMATURE GRANULOCYTES 1 (H) 0.0 - 0.5 %    ABS. NEUTROPHILS 9.6 (H) 1.8 - 8.0 K/UL    ABS. LYMPHOCYTES 1.2 0.8 - 3.5 K/UL    ABS. MONOCYTES 0.8 0.0 - 1.0 K/UL    ABS. EOSINOPHILS 0.2 0.0 - 0.4 K/UL    ABS. BASOPHILS 0.1 0.0 - 0.1 K/UL    ABS. IMM.  GRANS. 0.1 (H) 0.00 - 0.04 K/UL    DF AUTOMATED     METABOLIC PANEL, COMPREHENSIVE    Collection Time: 10/29/20 12:50 PM   Result Value Ref Range    Sodium 131 (L) 136 - 145 mmol/L    Potassium 6.5 (H) 3.5 - 5.1 mmol/L    Chloride 95 (L) 97 - 108 mmol/L    CO2 23 21 - 32 mmol/L    Anion gap 13 5 - 15 mmol/L    Glucose 77 65 - 100 mg/dL    BUN 67 (H) 6 - 20 MG/DL    Creatinine 11.10 (H) 0.70 - 1.30 MG/DL    BUN/Creatinine ratio 6 (L) 12 - 20      GFR est AA 6 (L) >60 ml/min/1.73m2    GFR est non-AA 5 (L) >60 ml/min/1.73m2    Calcium 7.2 (L) 8.5 - 10.1 MG/DL    Bilirubin, total 0.5 0.2 - 1.0 MG/DL    ALT (SGPT) 28 12 - 78 U/L    AST (SGOT) 16 15 - 37 U/L    Alk. phosphatase 200 (H) 45 - 117 U/L    Protein, total 8.1 6.4 - 8.2 g/dL    Albumin 3.4 (L) 3.5 - 5.0 g/dL    Globulin 4.7 (H) 2.0 - 4.0 g/dL    A-G Ratio 0.7 (L) 1.1 - 2.2     LIPASE    Collection Time: 10/29/20 12:50 PM   Result Value Ref Range    Lipase 227 73 - 393 U/L   EKG, 12 LEAD, INITIAL    Collection Time: 10/29/20  2:03 PM   Result Value Ref Range    Ventricular Rate 93 BPM    Atrial Rate 93 BPM    P-R Interval 172 ms    QRS Duration 104 ms    Q-T Interval 412 ms    QTC Calculation (Bezet) 512 ms    Calculated P Axis 60 degrees    Calculated R Axis 62 degrees    Calculated T Axis -71 degrees    Diagnosis       Normal sinus rhythm  Nonspecific T wave abnormality  Prolonged QT  When compared with ECG of 20-OCT-2020 20:32,  Nonspecific T wave abnormality has replaced inverted T waves in Inferior   leads  Nonspecific T wave abnormality has replaced inverted T waves in Lateral leads         Radiologic Studies -   CT ABD PELV W CONT   Final Result   IMPRESSION: Possible pericystic fat stranding which can indicate cystitis and   correlation with clinical data and urinalysis/culture should be considered   incidentals as above.         CT Results  (Last 48 hours)               10/29/20 1312  CT ABD PELV W CONT Final result    Impression:  IMPRESSION: Possible pericystic fat stranding which can indicate cystitis and correlation with clinical data and urinalysis/culture should be considered   incidentals as above. Narrative:  EXAM: CT ABD PELV W CONT       INDICATION: back pain; positive blood cultures; abd pain       COMPARISON: CT 1/16/2019 and more remote priors dating to 11/1/2007. CONTRAST: 100 mL of Isovue-370. TECHNIQUE:    Following the uneventful intravenous administration of contrast, thin axial   images were obtained through the abdomen and pelvis. Coronal and sagittal   reconstructions were generated. Oral contrast was not administered. CT dose   reduction was achieved through use of a standardized protocol tailored for this   examination and automatic exposure control for dose modulation. FINDINGS:    LOWER THORAX: Posterior dependent atelectasis with otherwise clear lungs. Pacemaker lead noted. Cardiac chamber dilation with cardiac enlargement. LIVER: Lobulated morphology with no focal lesion. Jacinto Edy BILIARY TREE: The bladder is surgically absent. CBD is not dilated. SPLEEN: within normal limits. PANCREAS: No mass or ductal dilatation. ADRENALS: Unremarkable. KIDNEYS: Severe bilateral native renal atrophy with small bilateral pelvic   calcified renal transplants and no evident collecting system stone, mass, or   hydronephrosis. STOMACH: Unremarkable. SMALL BOWEL: No dilatation or wall thickening. Left-sided small bowel   anastomotic site appears as expected. COLON: No dilatation or wall thickening. APPENDIX: Surgically absent. PERITONEUM: No ascites or pneumoperitoneum. Rim calcified mesenteric mass   measuring 3.1 x 2.3 cm appears unchanged from prior exams dating to 2018 where   it appears to have resulted from a loculated mesenteric collection seen on prior   exam of 5/14/2012. RETROPERITONEUM: No lymphadenopathy or aortic aneurysm. Atherosclerotic   calcifications with left external iliac artery stent noted.    REPRODUCTIVE ORGANS: Prostate and seminal vesicles appear unremarkable. URINARY BLADDER: Undistended with no evident mass or calculus. There is an   appearance of some pericystic stranding. BONES: Degenerative spine change. No acute fracture or aggressive lesion. ABDOMINAL WALL: No mass or hernia. ADDITIONAL COMMENTS: Right femoral line traverses expected course to terminate   at the level of the diaphragm in the inferior venous cava. CXR Results  (Last 48 hours)    None            Medical Decision Making   I am the first provider for this patient. I reviewed the vital signs, available nursing notes, past medical history, past surgical history, family history and social history. Vital Signs-Reviewed the patient's vital signs. Patient Vitals for the past 12 hrs:   Temp Pulse Resp BP SpO2   10/29/20 1415  89 17 124/81 100 %   10/29/20 1400    130/84 100 %   10/29/20 1325    121/85 100 %   10/29/20 1213     100 %   10/29/20 1127 98.6 °F (37 °C) 86 16 (!) 126/90 100 %       Records Reviewed: Nursing Notes and Old Medical Records    Provider Notes (Medical Decision Making):   Patient presenting for myalgias, back pain, abdominal pain and nausea vomiting in the setting of recent positive blood cultures. Per chart review, patient did have 4 bottles growing Staphylococcus. Patient states that he has been getting vancomycin on Saturday, Monday, and Wednesday at dialysis. He denies any rashes and nothing noted on his skin exam or physical exam.  With his back pain could be a osteomyelitis, discitis, with the abdominal pain, could be infection of the abdomen triggering it. Could also be endocarditis or catheter infection. Catheter does appear normal.  Will get repeat blood cultures, POC lactate, labs and CT of the abdomen. ED Course:   Initial assessment performed. The patients presenting problems have been discussed, and they are in agreement with the care plan formulated and outlined with them.   I have encouraged them to ask questions as they arise throughout their visit. ED Course as of Oct 29 1428   Thu Oct 29, 2020   1408 Potassium returned at 6.5 and CT shows signs of cystitis the patient does not make any urine. He is still complaining of some mild pain. Will speak with nephrology, give him calcium, bicarb, insulin and D50 given some peak T waves on EKG, and admit. Vancomycin also ordered. [JS]   1409 EKG interpreted by me. Shows normal sinus rhythm with a heart rate of 93. No ST elevations or depressions concerning for ischemia. Peak T waves. [JS]      ED Course User Index  [JS] Aime Chavez MD     Critical Care Time:   CRITICAL CARE NOTE :    2:28 PM  IMPENDING DETERIORATION -Cardiovascular and Metabolic  ASSOCIATED RISK FACTORS - Hypotension, Shock and Metabolic changes  MANAGEMENT- Bedside Assessment and Supervision of Care  INTERPRETATION -  CT Scan, ECG, Blood Pressure and Cardiac Output Measures   INTERVENTIONS - hemodynamic mngmt and Metobolic interventions  CASE REVIEW - Hospitalist, Medical Sub-Specialist and Nursing  TREATMENT RESPONSE -Improved  PERFORMED BY - Self    NOTES   :    Michaela BARON, have spent 50 minutes of critical care time involved in lab review, consultations with specialist, family decision- making, bedside attention and documentation. This time excludes time spent in any separate billed procedures. During this entire length of time I was immediately available to the patient. Disposition:    Admission Note:  Patient is being admitted to the hospital by Dr. Ayleen Eng, Service: Hospitalist.  The results of their tests and reasons for their admission have been discussed with them and available family. They convey agreement and understanding for the need to be admitted and for their admission diagnosis. Diagnosis     Clinical Impression:   1. Acute hyperkalemia    2. Bacteremia    3. Cystitis        Attestations:    Michaela Dyer M.D.         Please note that this dictation was completed with Dragon, the computer voice recognition software. Quite often unanticipated grammatical, syntax, homophones, and other interpretive errors are inadvertently transcribed by the computer software. Please disregard these errors. Please excuse any errors that have escaped final proofreading. Thank you.

## 2020-10-29 NOTE — ED NOTES
TRANSFER - OUT REPORT:    Verbal report given to [address] (name) on Olesya Harrison  being transferred to [facility] room 2203(unit) for routine progression of care       Report consisted of patients Situation, Background, Assessment and   Recommendations(SBAR). Information from the following report(s) SBAR, ED Summary, Procedure Summary, Intake/Output and MAR was reviewed with the receiving nurse. Lines:   Peripheral IV 10/29/20 Right Forearm (Active)   Site Assessment Clean, dry, & intact 10/29/20 1237   Phlebitis Assessment 0 10/29/20 1237   Infiltration Assessment 0 10/29/20 1237   Dressing Status Clean, dry, & intact 10/29/20 1237   Dressing Type Tape;Transparent 10/29/20 1237   Hub Color/Line Status Pink;Flushed;Positional 10/29/20 1237   Action Taken Blood drawn 10/29/20 1237       Peripheral IV 69/56/85 Right Basilic (Active)       Peripheral IV 61/20/23 Right Basilic (Active)        Opportunity for questions and clarification was provided.       Patient transported with  Transport team.

## 2020-10-29 NOTE — PROGRESS NOTES
CM attempted to call pt via room phone and cell phone, no answer to either number. CM to re-attempt to conduct initial assessment.

## 2020-10-29 NOTE — CONSULTS
Nephrology Consult Note     Blaise Leblanc     www. [facility]                    Phone - [phone]   Patient: Melinda George  YOB: 1975     Patient: Melinda George   YOB: 1975    Date- 10/29/2020  MRN: [id number]            REASON FOR CONSULTATION:  ESRD  CONSULTING PHYSICIAN:  Dr Aury Willis MD     IMPRESSION:   · ESRD on home HD  · Hyperkalemia  · Bacteremia  · Anemia of CKD  · MBD  · HTN  · CAD  · H/o kidney transplant(failed)     PLAN:   · HD today w/ 2K bath  · Remove Perm cath and send for cultures  · Use AVG for dialysis  · Vancomycin for bacteremia, dose per Pharmacy  · Renal diet  · resume home meds   · Resume EPO     ·     Subjective:   HPI: Melinda George is a 39 y.o.  male with h/o ESRD. The patient [facility] and usually does home HD 5x week 3hs  He reports he was using Left AVG for HD in dialysis unit MWF and R femoral Perm cath for home HD on Tuesday and Thursday. He reports his last HD was yesterday at the dialysis unit but was terminated earlier due to clotting in the system. Pt was called to inform about bacteremia in blood cultures done on 10/20 from recent hospitalization. He endorses myalgias, nausea, vomiting, abdominal and back pain. He is anuric, denies any diarrhea, constipation or any fevers at home.         Past Medical History:   Diagnosis Date    Abdominal hematoma     AICD (automatic cardioverter/defibrillator) present     CAD (coronary artery disease)     anterior MI s/p 2 stents  2007    Chronic abdominal pain     Chronic kidney disease     ESRD; Dialysis dependent.  Home [facility] does labs- [facility] tpke    DVT (deep venous thrombosis) ([facility] [address] 2001    DVT of popliteal vein ([facility] [address] 11/2011    not anticoagulated due to bleeding, IVC filter    Endocarditis     Gallstone pancreatitis     Gastrointestinal disorder     acid reflux    Gastrointestinal disorder     peptic ulcer    [facility]od[facility] [address] [facility]     High cholesterol     Hypertension     Kidney transplant     b/l kidneys 1995, 2001    Long term current use of anticoagulant therapy     Nephrotic syndrome     Other ill-defined conditions(799.89)     kidny transplant x2,  on dialysis    Other ill-defined conditions(799.89)     home dialysis    Other ill-defined conditions(799.89)     hx recurrent left leg DVT    Peritonitis ([facility]     Seizures ([facility] [address] [facility] 2015    most recent- only had three in lifetime    Small bowel obstruction (HCC)     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[facility] [address] [facility]     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    [facility]-tach [facility])       Past Surgical History:   Procedure Laterality Date    CARDIAC SURG PROCEDURE UNLIST  2007    2 stents    HX APPENDECTOMY      HX CHOLECYSTECTOMY      HX OTHER SURGICAL      cholecystectomy    HX OTHER SURGICAL  11/2011    exploratory laparotomy    HX OTHER SURGICAL      fem-pop    HX OTHER SURGICAL  02/2013    right upper extremity fistula    HX PACEMAKER Left 6/2009    AICD-st latonya-not connected    HX PACEMAKER Left     AICD-left side-BS-working    HX SMALL BOWEL RESECTION      HX TRANSPLANT  2001     Kidney    HX TRANSPLANT  1992    kidney    HX UROLOGICAL      2 kidney transplants    HX VASCULAR ACCESS Right     femoral access for HD- does 5 day dialysis @ home    IR REPLACE CVC TUNNELED W/O PORT  9/10/2020    TX EDG US EXAM SURGICAL ALTER STOM DUODENUM/JEJUNUM  7/15/2011         TX ESOPHAGOGASTRODUODENOSCOPY TRANSORAL DIAGNOSTIC  5/24/2012         VASCULAR SURGERY PROCEDURE UNLIST  11/14/2017    Insertion AV graft right upper arm (bovine); ligation of AV fistula right arm      Prior to Admission medications    Medication Sig Start Date End Date Taking? Authorizing Provider   ondansetron (ZOFRAN ODT) 4 mg disintegrating tablet Take 1 Tab by mouth every eight (8) hours as needed.  1/20/19   Nhung Hunt MD   acetaminophen (TYLENOL) 500 mg tablet Take 1 Tab by mouth every four (4) hours as needed for Pain. Over the counter 1/20/19   Domenic Dowling MD   sevelamer carbonate (RENVELA) 800 mg tab tab Take 1,600 mg by mouth three (3) times daily (with meals). Only takes if eating a  meal    Provider, Historical   warfarin (COUMADIN) 2.5 mg tablet Take 2.5 mg by mouth five (5) days a week. Every day except Monday & wednesday    Provider, Historical   warfarin (COUMADIN) 5 mg tablet Take 5 mg by mouth two (2) days a week. Monday & wednesday    Provider, Historical   metoprolol succinate (TOPROL-XL) 25 mg XL tablet Take 12.5 mg by mouth daily. am    Provider, Historical   omeprazole (PRILOSEC) 20 mg capsule Take 20 mg by mouth daily. Provider, Historical   calcitRIOL (ROCALTROL) 0.5 mcg capsule Take 0.5 mcg by mouth daily. Provider, Historical   aspirin 81 mg chewable tablet Take 81 mg by mouth daily. Other, MD Morro     Allergies   Allergen Reactions    Shellfish Containing Products Swelling     Break out in rash, trouble breathing    Heparin Analogues Other (comments)     Positive history of HIT      Social History     Tobacco Use    Smoking status: Never Smoker    Smokeless tobacco: Never Used   Substance Use Topics    Alcohol use: No      Family History   Problem Relation Age of Onset    COPD Mother     Lung Disease Mother     Cancer Father         stomach    Cancer Maternal Grandmother      Review of Systems:  A 11 point review of system was performed today. Pertinent positives and negatives are mentioned in the HPI. The reminder of the ROS is negative and noncontributory. Objective:    Vitals:    Vitals:    10/29/20 1213 10/29/20 1325 10/29/20 1400 10/29/20 1415   BP:  121/85 130/84 124/81   Pulse:    89   Resp:    17   Temp:       SpO2: 100% 100% 100% 100%   Weight:       Height:         I&O's:  No intake/output data recorded.     Physical Exam:  General:AAOx3, in NAD  HEENT: AT/NC, MMM  Neck:Supple,no JVD  Lungs: normal respiratory effort, satting well  CV:RRR, S1 S2 normal  Abdomen:Soft,ND/NT  Extremities: moves all, no LE edema  Skin:No visible rash or lesions  NEURO: grossly nonfocal  HD access: AVG in LUE w/ good pulsation  R femoral Perm cath in place     Care Plan discussed with:  Patient, ER MD  Chart reviewed. Lab Data Personally Reviewed: (see below)    Recent Labs     10/29/20  1250   WBC 12.0*   HGB 9.1*      ANEU 9.6*   *   K 6.5*   GLU 77   BUN 67*   CREA 11.10*   ALT 28   TBILI 0.5   *   CA 7.2*     Lab Results   Component Value Date/Time    Specimen Description: NARES 05/30/2013 05:53 PM    Specimen Description: URINE 11/16/2012 05:15 PM    Specimen Description: URINE 09/17/2012 01:35 AM     Lab Results   Component Value Date/Time    Culture result: (A) 10/20/2020 11:19 PM     STAPHYLOCOCCUS EPIDERMIDIS (OXACILLIN RESISTANT) GROWING IN ALL 4 BOTTLES DRAWN (SITE = RAC 1 AND R [facility])    Culture result: MRSA NOT PRESENT 09/08/2020 06:42 AM    Culture result:  09/08/2020 06:42 AM         Screening of patient nares for MRSA is for surveillance purposes and, if positive, to facilitate isolation considerations in high risk settings. It is not intended for automatic decolonization interventions per se as regimens are not sufficiently effective to warrant routine use. Lab Results   Component Value Date/Time    Iron 57 11/22/2018 05:27 AM    TIBC 234 (L) 11/22/2018 05:27 AM    Iron % saturation 24 11/22/2018 05:27 AM    Ferritin 547 (H) 01/17/2012 12:30 PM     Prior to Admission medications    Medication Sig Start Date End Date Taking? Authorizing Provider   ondansetron (ZOFRAN ODT) 4 mg disintegrating tablet Take 1 Tab by mouth every eight (8) hours as needed. 1/20/19   Geovanna Chavez MD   acetaminophen (TYLENOL) 500 mg tablet Take 1 Tab by mouth every four (4) hours as needed for Pain.  Over the counter 1/20/19   Geovanna Chavez MD   sevelamer carbonate (RENVELA) 800 mg tab tab Take 1,600 mg by mouth three (3) times daily (with meals). Only takes if eating a  meal    Provider, Historical   warfarin (COUMADIN) 2.5 mg tablet Take 2.5 mg by mouth five (5) days a week. Every day except Monday & wednesday    Provider, Historical   warfarin (COUMADIN) 5 mg tablet Take 5 mg by mouth two (2) days a week. Monday & wednesday    Provider, Historical   metoprolol succinate (TOPROL-XL) 25 mg XL tablet Take 12.5 mg by mouth daily. am    Provider, Historical   omeprazole (PRILOSEC) 20 mg capsule Take 20 mg by mouth daily. Provider, Historical   calcitRIOL (ROCALTROL) 0.5 mcg capsule Take 0.5 mcg by mouth daily. Provider, Historical   aspirin 81 mg chewable tablet Take 81 mg by mouth daily. Other, MD Morro     Medications list Personally Reviewed   [x]          Yes     []               No    Prior to Admission Medications   Prescriptions Last Dose Informant Patient Reported? Taking?   acetaminophen (TYLENOL) 500 mg tablet   No No   Sig: Take 1 Tab by mouth every four (4) hours as needed for Pain. Over the counter   aspirin 81 mg chewable tablet  Self Yes No   Sig: Take 81 mg by mouth daily. calcitRIOL (ROCALTROL) 0.5 mcg capsule  Self Yes No   Sig: Take 0.5 mcg by mouth daily. metoprolol succinate (TOPROL-XL) 25 mg XL tablet  Self Yes No   Sig: Take 12.5 mg by mouth daily. am   omeprazole (PRILOSEC) 20 mg capsule  Self Yes No   Sig: Take 20 mg by mouth daily. ondansetron (ZOFRAN ODT) 4 mg disintegrating tablet   No No   Sig: Take 1 Tab by mouth every eight (8) hours as needed. sevelamer carbonate (RENVELA) 800 mg tab tab  Self Yes No   Sig: Take 1,600 mg by mouth three (3) times daily (with meals). Only takes if eating a  meal   warfarin (COUMADIN) 2.5 mg tablet  Self Yes No   Sig: Take 2.5 mg by mouth five (5) days a week. Every day except Monday & wednesday   warfarin (COUMADIN) 5 mg tablet  Self Yes No   Sig: Take 5 mg by mouth two (2) days a week.  Monday & wednesday      Facility-Administered Medications: None       Thank you for allowing us to participate in the care this patient. We will follow patient with you. Caleb Lee, [address] [facility]  [facility] SYSTM FRANCISXANDER [facility] [address], [address] [place], [address] [place] [facility]  Phone - [phone]         Fax - [phone] [facility] Office  [address] [address]  Phone - [phone]        Fax - [phone]     www. [facility]"[employer]"com

## 2020-10-30 LAB
ALBUMIN SERPL-MCNC: 2.7 G/DL (ref 3.5–5)
ANION GAP SERPL CALC-SCNC: 9 MMOL/L (ref 5–15)
BASOPHILS # BLD: 0.1 K/UL (ref 0–0.1)
BASOPHILS NFR BLD: 1 % (ref 0–1)
BUN SERPL-MCNC: 34 MG/DL (ref 6–20)
BUN/CREAT SERPL: 5 (ref 12–20)
CALCIUM SERPL-MCNC: 7.3 MG/DL (ref 8.5–10.1)
CHLORIDE SERPL-SCNC: 99 MMOL/L (ref 97–108)
CO2 SERPL-SCNC: 29 MMOL/L (ref 21–32)
CREAT SERPL-MCNC: 6.98 MG/DL (ref 0.7–1.3)
DIFFERENTIAL METHOD BLD: ABNORMAL
EOSINOPHIL # BLD: 0.3 K/UL (ref 0–0.4)
EOSINOPHIL NFR BLD: 3 % (ref 0–7)
ERYTHROCYTE [DISTWIDTH] IN BLOOD BY AUTOMATED COUNT: 17.8 % (ref 11.5–14.5)
GLUCOSE SERPL-MCNC: 73 MG/DL (ref 65–100)
HCT VFR BLD AUTO: 22.3 % (ref 36.6–50.3)
HGB BLD-MCNC: 7.5 G/DL (ref 12.1–17)
IMM GRANULOCYTES # BLD AUTO: 0 K/UL (ref 0–0.04)
IMM GRANULOCYTES NFR BLD AUTO: 0 % (ref 0–0.5)
INR PPP: 1.3 (ref 0.9–1.1)
LYMPHOCYTES # BLD: 1.1 K/UL (ref 0.8–3.5)
LYMPHOCYTES NFR BLD: 13 % (ref 12–49)
MCH RBC QN AUTO: 31.9 PG (ref 26–34)
MCHC RBC AUTO-ENTMCNC: 33.6 G/DL (ref 30–36.5)
MCV RBC AUTO: 94.9 FL (ref 80–99)
MONOCYTES # BLD: 0.9 K/UL (ref 0–1)
MONOCYTES NFR BLD: 11 % (ref 5–13)
NEUTS SEG # BLD: 5.8 K/UL (ref 1.8–8)
NEUTS SEG NFR BLD: 72 % (ref 32–75)
NRBC # BLD: 0 K/UL (ref 0–0.01)
NRBC BLD-RTO: 0 PER 100 WBC
PHOSPHATE SERPL-MCNC: 4.8 MG/DL (ref 2.6–4.7)
PLATELET # BLD AUTO: 262 K/UL (ref 150–400)
PMV BLD AUTO: 10.7 FL (ref 8.9–12.9)
POTASSIUM SERPL-SCNC: 4.5 MMOL/L (ref 3.5–5.1)
PROTHROMBIN TIME: 13.5 SEC (ref 9–11.1)
RBC # BLD AUTO: 2.35 M/UL (ref 4.1–5.7)
SODIUM SERPL-SCNC: 137 MMOL/L (ref 136–145)
WBC # BLD AUTO: 8.2 K/UL (ref 4.1–11.1)

## 2020-10-30 PROCEDURE — 65660000001 HC RM ICU INTERMED STEPDOWN

## 2020-10-30 PROCEDURE — 74011250637 HC RX REV CODE- 250/637: Performed by: INTERNAL MEDICINE

## 2020-10-30 PROCEDURE — 85610 PROTHROMBIN TIME: CPT

## 2020-10-30 PROCEDURE — 36415 COLL VENOUS BLD VENIPUNCTURE: CPT

## 2020-10-30 PROCEDURE — 74011250637 HC RX REV CODE- 250/637: Performed by: NURSE PRACTITIONER

## 2020-10-30 PROCEDURE — 85025 COMPLETE CBC W/AUTO DIFF WBC: CPT

## 2020-10-30 PROCEDURE — 74011250636 HC RX REV CODE- 250/636: Performed by: INTERNAL MEDICINE

## 2020-10-30 PROCEDURE — 80069 RENAL FUNCTION PANEL: CPT

## 2020-10-30 RX ORDER — CALCIUM ACETATE 667 MG/1
2 CAPSULE ORAL
Status: DISCONTINUED | OUTPATIENT
Start: 2020-10-30 | End: 2020-11-10 | Stop reason: HOSPADM

## 2020-10-30 RX ORDER — CALCIUM ACETATE 667 MG/1
2 TABLET ORAL
Status: DISCONTINUED | OUTPATIENT
Start: 2020-10-30 | End: 2020-10-30

## 2020-10-30 RX ADMIN — MORPHINE SULFATE 2 MG: 2 INJECTION, SOLUTION INTRAMUSCULAR; INTRAVENOUS at 10:14

## 2020-10-30 RX ADMIN — PANTOPRAZOLE SODIUM 40 MG: 40 TABLET, DELAYED RELEASE ORAL at 09:08

## 2020-10-30 RX ADMIN — MORPHINE SULFATE 2 MG: 2 INJECTION, SOLUTION INTRAMUSCULAR; INTRAVENOUS at 16:55

## 2020-10-30 RX ADMIN — METOPROLOL SUCCINATE 12.5 MG: 25 TABLET, EXTENDED RELEASE ORAL at 09:10

## 2020-10-30 RX ADMIN — SEVELAMER CARBONATE 1600 MG: 800 TABLET, FILM COATED ORAL at 09:09

## 2020-10-30 RX ADMIN — ASPIRIN 81 MG CHEWABLE TABLET 81 MG: 81 TABLET CHEWABLE at 09:09

## 2020-10-30 RX ADMIN — CALCIUM ACETATE 1334 MG: 667 CAPSULE ORAL at 16:54

## 2020-10-30 RX ADMIN — MORPHINE SULFATE 2 MG: 2 INJECTION, SOLUTION INTRAMUSCULAR; INTRAVENOUS at 04:23

## 2020-10-30 RX ADMIN — WARFARIN SODIUM 5 MG: 5 TABLET ORAL at 00:16

## 2020-10-30 RX ADMIN — CALCIUM ACETATE 1334 MG: 667 CAPSULE ORAL at 12:03

## 2020-10-30 RX ADMIN — CALCITRIOL CAPSULES 0.25 MCG 0.5 MCG: 0.25 CAPSULE ORAL at 09:08

## 2020-10-30 RX ADMIN — MORPHINE SULFATE 2 MG: 2 INJECTION, SOLUTION INTRAMUSCULAR; INTRAVENOUS at 21:07

## 2020-10-30 NOTE — PROGRESS NOTES
Nephrology Progress Note     Blaise Leblanc     www. [facility]                    Phone - [phone]   Patient: Carloz Sewell  YOB: 1975     Patient: Carloz Sewell   YOB: 1975    Date- 10/30/2020  MRN: [id number]            F/u   ESRD  ADMIT DATE:10/29/2020 PATIENT PCP:Taz Moser MD     IMPRESSION:   · ESRD on home HD at [facility]  · Hyperkalemia--resolved  · Sepsis with Staph epidermidis Bacteremia   · Hypocalcemia  · Hyperphosphatemia  · Anemia of CKD  · MBD  · HTN  · CAD  · H/o kidney transplant(failed)     PLAN:   · HD yesterday  · Removed Perm cath 10/29  · Using left AVG for dialysis  · Will follow TTS schedule as inpt  · Vancomycin for bacteremia, dose per Pharmacy  · Changed Sevelamer to Phoslo for combined hypocalcemia/hyperphosphatemia  · Renal diet  · Cont EPO     ·     Subjective:   Resolved hyperkalemia  Pending repeat blood cx from yesterday  Pt reports nausea and poor appetite       Past Medical History:   Diagnosis Date    Abdominal hematoma     AICD (automatic cardioverter/defibrillator) present     CAD (coronary artery disease)     anterior MI s/p 2 stents  2007    Chronic abdominal pain     Chronic kidney disease     ESRD; Dialysis dependent.  Home [facility] does labs- [facility] tpke    DVT (deep venous thrombosis) ([address] 2001    DVT of popliteal vein ([address] 11/2011    not anticoagulated due to bleeding, IVC filter    Endocarditis     Gallstone pancreatitis     Gastrointestinal disorder     acid reflux    Gastrointestinal disorder     peptic ulcer    Hemodialysis [address] ([address]     High cholesterol     Hypertension     Kidney transplant     b/l kidneys 1995, 2001    Long term current use of anticoagulant therapy     Nephrotic syndrome     Other ill-defined conditions(799.89)     kidny transplant x2,  on dialysis    Other ill-defined conditions(799.89)     home dialysis    Other ill-defined conditions(799.89)     hx recurrent left leg DVT    Per[facility]itis ([facility]     Seizures ([facility] [address] 2015    most recent- only had three in lifetime    Small bowel obstruction (HCC)     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    V-tach [facility])       Past Surgical History:   Procedure Laterality Date    CARDIAC SURG PROCEDURE UNLIST  2007    2 stents    HX APPENDECTOMY      HX CHOLECYSTECTOMY      HX OTHER SURGICAL      cholecystectomy    HX OTHER SURGICAL  11/2011    exploratory laparotomy    HX OTHER SURGICAL      fem-pop    HX OTHER SURGICAL  02/2013    right upper extremity fistula    HX PACEMAKER Left 6/2009    AICD-st latonya-not connected    HX PACEMAKER Left     AICD-left side-BS-working    HX SMALL BOWEL RESECTION      HX TRANSPLANT  2001     Kidney    HX TRANSPLANT  1992    kidney    HX UROLOGICAL      2 kidney transplants    HX VASCULAR ACCESS Right     femoral access for HD- does 5 day dialysis @ home    IR REMOVE TUNL CVAD W/O PORT / PUMP  10/29/2020    IR REPLACE CVC TUNNELED W/O PORT  9/10/2020    SD EDG US EXAM SURGICAL ALTER STOM DUODENUM/JEJUNUM  7/15/2011         SD ESOPHAGOGASTRODUODENOSCOPY TRANSORAL DIAGNOSTIC  5/24/2012         VASCULAR SURGERY PROCEDURE UNLIST  11/14/2017    Insertion AV graft right upper arm (bovine); ligation of AV fistula right arm      Prior to Admission medications    Medication Sig Start Date End Date Taking? Authorizing Provider   cinacalcet 60 mg tab Take 60 mg by mouth daily. Yes Provider, Historical   atorvastatin (Lipitor) 20 mg tablet Take 20 mg by mouth daily. Yes Provider, Historical   metoprolol succinate (TOPROL-XL) 25 mg XL tablet Take 25 mg by mouth daily. Yes Provider, Historical   warfarin (COUMADIN) 2.5 mg tablet Take 2.5 mg by mouth daily. Yes Provider, Historical   acetaminophen (TYLENOL) 500 mg tablet Take 1 Tab by mouth every four (4) hours as needed for Pain.  Over the counter 1/20/19  Yes Jenna Prieto MD   sevelamer carbonate (RENVELA) 800 mg tab tab Take 1,600 mg by mouth three (3) times daily (with meals). Only takes if eating a  meal   Yes Provider, Historical   omeprazole (PRILOSEC) 20 mg capsule Take 20 mg by mouth daily. Yes Provider, Historical   calcitRIOL (ROCALTROL) 0.5 mcg capsule Take 0.5 mcg by mouth daily. Yes Provider, Historical   aspirin 81 mg chewable tablet Take 81 mg by mouth daily. Yes Other, MD Morro     Allergies   Allergen Reactions    Shellfish Containing Products Swelling     Break out in rash, trouble breathing    Heparin Analogues Other (comments)     Positive history of HIT      Social History     Tobacco Use    Smoking status: Never Smoker    Smokeless tobacco: Never Used   Substance Use Topics    Alcohol use: No      Family History   Problem Relation Age of Onset    COPD Mother     Lung Disease Mother     Cancer Father         stomach    Cancer Maternal Grandmother      Review of Systems:  A 11 point review of system was performed today. Pertinent positives and negatives are mentioned in the HPI. The reminder of the ROS is negative and noncontributory. Objective:    Vitals:    Vitals:    10/30/20 0246 10/30/20 0259 10/30/20 0643 10/30/20 0908   BP: 131/80  115/73 139/84   Pulse: 89  95 87   Resp: 18  16    Temp: 98.4 °F (36.9 °C)  98.5 °F (36.9 °C)    SpO2: 98%  100%    Weight:  70.1 kg (154 lb 9.6 oz)     Height:         I&O's:  10/29 0701 - 10/30 0700  In: 480 [P.O.:480]  Out: 2000     Physical Exam:  General:AAOx3, in NAD  HEENT: AT/NC, MMM  Neck:Supple,no JVD  Lungs: normal respiratory effort, satting well  CV:RRR, S1 S2 normal  Abdomen:Soft,ND/NT  Extremities: moves all, no LE edema  Skin:No visible rash or lesions  NEURO: grossly nonfocal  HD access: AVG in LUE w/ good pulsation      Care Plan discussed with:  Patient  Chart reviewed.       Lab Data Personally Reviewed: (see below)    Recent Labs     10/30/20  0252 10/29/20  2146 10/29/20  1250   WBC 8.2  --  12.0*   HGB 7.5*  --  9.1*     -- 289   ANEU 5.8  --  9.6*   INR 1.3* 1.2*  --      --  131*   K 4.5  --  6.5*   GLU 73  --  77   BUN 34*  --  67*   CREA 6.98*  --  11.10*   ALT  --   --  28   TBILI  --   --  0.5   AP  --   --  200*   CA 7.3*  --  7.2*   PHOS 4.8*  --   --      Lab Results   Component Value Date/Time    Specimen Description: NARES 05/30/2013 05:53 PM    Specimen Description: URINE 11/16/2012 05:15 PM    Specimen Description: URINE 09/17/2012 01:35 AM     Lab Results   Component Value Date/Time    Culture result: PENDING 10/29/2020 06:13 PM    Culture result: NO GROWTH AFTER 16 HOURS 10/29/2020 12:50 PM    Culture result: (A) 10/20/2020 11:19 PM     STAPHYLOCOCCUS EPIDERMIDIS (OXACILLIN RESISTANT) GROWING IN ALL 4 BOTTLES DRAWN (SITE = [facility])     Lab Results   Component Value Date/Time    Iron 57 11/22/2018 05:27 AM    TIBC 234 (L) 11/22/2018 05:27 AM    Iron % saturation 24 11/22/2018 05:27 AM    Ferritin 547 (H) 01/17/2012 12:30 PM     Prior to Admission medications    Medication Sig Start Date End Date Taking? Authorizing Provider   cinacalcet 60 mg tab Take 60 mg by mouth daily. Yes Provider, Historical   atorvastatin (Lipitor) 20 mg tablet Take 20 mg by mouth daily. Yes Provider, Historical   metoprolol succinate (TOPROL-XL) 25 mg XL tablet Take 25 mg by mouth daily. Yes Provider, Historical   warfarin (COUMADIN) 2.5 mg tablet Take 2.5 mg by mouth daily. Yes Provider, Historical   acetaminophen (TYLENOL) 500 mg tablet Take 1 Tab by mouth every four (4) hours as needed for Pain. Over the counter 1/20/19  Yes April Verdugo MD   sevelamer carbonate (RENVELA) 800 mg tab tab Take 1,600 mg by mouth three (3) times daily (with meals). Only takes if eating a  meal   Yes Provider, Historical   omeprazole (PRILOSEC) 20 mg capsule Take 20 mg by mouth daily. Yes Provider, Historical   calcitRIOL (ROCALTROL) 0.5 mcg capsule Take 0.5 mcg by mouth daily.    Yes Provider, Historical   aspirin 81 mg chewable tablet Take 81 mg by mouth daily. Yes Other, MD Morro     Medications list Personally Reviewed   [x]          Yes     []               No    Prior to Admission Medications   Prescriptions Last Dose Informant Patient Reported? Taking?   acetaminophen (TYLENOL) 500 mg tablet   No No   Sig: Take 1 Tab by mouth every four (4) hours as needed for Pain. Over the counter   aspirin 81 mg chewable tablet  Self Yes No   Sig: Take 81 mg by mouth daily. calcitRIOL (ROCALTROL) 0.5 mcg capsule  Self Yes No   Sig: Take 0.5 mcg by mouth daily. metoprolol succinate (TOPROL-XL) 25 mg XL tablet  Self Yes No   Sig: Take 12.5 mg by mouth daily. am   omeprazole (PRILOSEC) 20 mg capsule  Self Yes No   Sig: Take 20 mg by mouth daily. ondansetron (ZOFRAN ODT) 4 mg disintegrating tablet   No No   Sig: Take 1 Tab by mouth every eight (8) hours as needed. sevelamer carbonate (RENVELA) 800 mg tab tab  Self Yes No   Sig: Take 1,600 mg by mouth three (3) times daily (with meals). Only takes if eating a  meal   warfarin (COUMADIN) 2.5 mg tablet  Self Yes No   Sig: Take 2.5 mg by mouth five (5) days a week. Every day except Monday & wednesday   warfarin (COUMADIN) 5 mg tablet  Self Yes No   Sig: Take 5 mg by mouth two (2) days a week. Monday & wednesday      Facility-Administered Medications: None       Thank you for allowing us to participate in the care this patient. We will follow patient with you. Kiah Taylor [address] [facility]  [facility] [address], [place] [address], [address] [place]  Phone - [phone]         Fax - [phone] [facility] Office  [address] [address]  Phone - [phone]        Fax - [phone]     www. [facility]

## 2020-10-30 NOTE — PROGRESS NOTES
[address] END OF SHIFT REPORT      Bedside shift change report GIVEN TO David Holly RN. Report included the following information SBAR and Kardex. SIGNIFICANT CHANGES DURING SHIFT:  None. CONCERNS TO ADDRESS WITH MD:  None. [address] NURSING NOTE   Admission Date 10/29/2020   Admission Diagnosis Hyperkalemia [E87.5]  ESRD (end stage renal disease) ([facility] [N18.6]  CAD (coronary artery disease) [I25.10]   Consults IP CONSULT TO NEPHROLOGY      Cardiac Monitoring [x] Yes [] No      Purposeful Hourly Rounding [x] Yes    Easton Score Total Score: 1   Easton score 3 or > [] Bed Alarm [] Avasys [] 1:1 sitter [] Patient refused (Signed refusal form in chart)   Yong Score Yong Score: 19   Yong score 14 or < [] PMT consult [] Wound Care consult    []  Specialty bed  [] Nutrition consult      Influenza Vaccine             Oxygen needs? [x] Room air Oxygen @  []1L    []2L    []3L   []4L    []5L   []6L via  NC   Chronic home O2 use?  [] Yes [x] No  Perform O2 challenge test and document in progress note using smartphrase (.Homeoxygen)      Last bowel movement Last Bowel Movement Date: 10/29/20      Urinary Catheter             LDAs               Peripheral IV 24/28/03 Right Basilic (Active)   Site Assessment Clean, dry, & intact 10/30/20 0300   Phlebitis Assessment 0 10/30/20 0300   Infiltration Assessment 0 10/30/20 0300   Dressing Status Clean, dry, & intact 10/30/20 0300   Dressing Type Transparent;Tape 10/30/20 0300   Hub Color/Line Status Flushed;Green 10/30/20 0300   Action Taken Open ports on tubing capped 10/29/20 1824   Alcohol Cap Used Yes 10/29/20 1824       Peripheral IV 76/04/19 Right Basilic (Active)   Site Assessment Clean, dry, & intact 10/30/20 0300   Phlebitis Assessment 0 10/30/20 0300   Infiltration Assessment 0 10/30/20 0300   Dressing Status Clean, dry, & intact 10/30/20 0300   Dressing Type Transparent;Tape 10/30/20 0300   Hub Color/Line Status Flushed;Pink 10/30/20 0300   Action Taken Open ports on tubing capped 10/29/20 1824   Alcohol Cap Used Yes 10/29/20 1824                         Readmission Risk Assessment Tool Score Medium Risk            18       Total Score        4 IP Visits Last 12 Months (1-3=4, 4=9, >4=11)    9 Pt. Coverage (Medicare=5 , Medicaid, or Self-Pay=4)    5 Charlson Comorbidity Score (Age + Comorbid Conditions)        Criteria that do not apply:    Has Seen PCP in Last 6 Months (Yes=3, No=0)    . Living with Significant Other. Assisted Living. LTAC. SNF.  or   Rehab    Patient Length of Stay (>5 days = 3)       Expected Length of Stay - - -   Actual Length of Stay 1

## 2020-10-30 NOTE — PROGRESS NOTES
MD Dosing of Warfarin    Indication: DVT    Goal INR: 2-3    PTA Warfarin Dose: 2.5 mg daily (updated PTA med)    Concurrent anticoagulants: none    Concurrent antiplatelet: aspirin    Major Interacting Medications   Drugs that may increase INR:   Drugs that may decrease INR:     Conditions that may increase/decrease INR (CHF, C. diff, cirrhosis, thyroid disorder, hypoalbuminemia):     Labs:  Recent Labs     10/29/20  2146 10/29/20  1250   INR 1.2*  --    HGB  --  9.1*   PLT  --  289   TBILI  --  0.5   ALB  --  3.4*         Impression/Plan:    Resumed Warfarin. Give 5 mg PO x1 tonight. INR daily. Thanks  Lucy Severe, PHARMD    [email or web address]. pdf

## 2020-10-30 NOTE — PROGRESS NOTES
-  Hospitalist Progress Note    NAME: Toby Marley   :  1975   MRN:  [id number]       Assessment / Plan:  ESRD on HD at [facility]  Sepsis with taph epidermidis bacteremia  Hyperkalemia, s/p IV calcium/insulin/HD  Anemia of CKD  Hx of kidney transplant  S/p removal of femoral Perm cath on 10/29  Using L AVF for HD  Repeat Bcx pending  Empiric IV vancomycin  Epo per nephro, cont' phoslo  Will need ID consulted for abx choice/duration    HTN  CAD  con't home meds: ASA/BB    Diarrhea, resolved  Send for cdiff study. He has been on abx for several days, PTA    H/o left leg DVT and s/p IVC and history of HIT  Subtherapeutic INR  On warfarin, appreciate pharmacy dosing warfarin. Daily INR    Code status: Full  Prophylaxis: Coumadin  Recommended Disposition: Home w/Family     Subjective:     Chief Complaint / Reason for Physician Visit  Pt seen, NAD. No new complaint. Discussed with RN events overnight. Review of Systems:  Symptom Y/N Comments  Symptom Y/N Comments   Fever/Chills    Chest Pain     Poor Appetite    Edema     Cough    Abdominal Pain     Sputum    Joint Pain     SOB/ZAMUDIO    Pruritis/Rash     Nausea/vomit    Tolerating PT/OT     Diarrhea y   Tolerating Diet     Constipation    Other       Could NOT obtain due to:      Objective:     VITALS:   Last 24hrs VS reviewed since prior progress note.  Most recent are:  Patient Vitals for the past 24 hrs:   Temp Pulse Resp BP SpO2   20 0642 98.2 °F (36.8 °C) 90 20 126/76 100 %   20 0359 98.7 °F (37.1 °C) 87 18 108/68 93 %   20 0210 98.9 °F (37.2 °C) 95 20 119/64 96 %   10/31/20 2356 100.2 °F (37.9 °C)       10/31/20 2309 100.1 °F (37.8 °C) (!) 102 22 112/61 94 %   10/31/20 2104  90 24 (!) 99/56 98 %   10/31/20 2030    (!) 159/78    10/31/20 2015  82  (!) 149/72    10/31/20 2000  82  (!) 157/85    10/31/20 1930  80  (!) 142/84    10/31/20 1915  80  (!) 156/85    10/31/20 1900  80  (!) 154/84    10/31/20 1845  75  (!) 152/85    10/31/20 1830  80  (!) 160/89    10/31/20 1815  79  (!) 154/92    10/31/20 1800  80  (!) 152/89    10/31/20 1745  83  (!) 154/45    10/31/20 1730  85  (!) 154/89    10/31/20 1715  89  (!) 145/89    10/31/20 1700 99 °F (37.2 °C) 90 18 (!) 152/94    10/31/20 1511 98.6 °F (37 °C) 88 18 129/83 98 %   10/31/20 1125 98.6 °F (37 °C) 87 18 127/80 96 %       Intake/Output Summary (Last 24 hours) at 11/1/2020 0818  Last data filed at 10/31/2020 2030  Gross per 24 hour   Intake    Output 2500 ml   Net -2500 ml        PHYSICAL EXAM:  General: WD, WN. Alert, cooperative, no acute distress    EENT:  EOMI. Anicteric sclerae. MMM  Resp:  CTA bilaterally, no wheezing or rales. No accessory muscle use  CV:  Regular  rhythm,  No edema  GI:  Soft, Non distended, Non tender.  +Bowel sounds  Neurologic:  Alert and oriented X 3, normal speech  Psych:   Good insight. Not anxious nor agitated  Skin:  No rashes. No jaundice    Reviewed most current lab test results and cultures  YES  Reviewed most current radiology test results   YES  Review and summation of old records today    NO  Reviewed patient's current orders and MAR    YES  PMH/SH reviewed - no change compared to H&P  ________________________________________________________________________  Care Plan discussed with:    Comments   Patient x    Family      RN x    Care Manager     Consultant                        Multidiciplinary team rounds were held today with , nursing, pharmacist and clinical coordinator. Patient's plan of care was discussed; medications were reviewed and discharge planning was addressed.      ________________________________________________________________________  Total NON critical care TIME:  35   Minutes    Total CRITICAL CARE TIME Spent:   Minutes non procedure based      Comments   >50% of visit spent in counseling and coordination of care ________________________________________________________________________  Mook Faustin MD     Procedures: see electronic medical records for all procedures/Xrays and details which were not copied into this note but were reviewed prior to creation of Plan. LABS:  I reviewed today's most current labs and imaging studies. Pertinent labs include:  Recent Labs     10/31/20  0155 10/30/20  0252 10/29/20  1250   WBC 8.3 8.2 12.0*   HGB 7.4* 7.5* 9.1*   HCT 22.5* 22.3* 27.4*    262 289     Recent Labs     11/01/20  0425 10/31/20  0155 10/30/20  0252  10/29/20  1250   NA  --  133* 137  --  131*   K  --  5.0 4.5  --  6.5*   CL  --  97 99  --  95*   CO2  --  27 29  --  23   GLU  --  80 73  --  77   BUN  --  40* 34*  --  67*   CREA  --  9.06* 6.98*  --  11.10*   CA  --  7.6* 7.3*  --  7.2*   PHOS  --  5.8* 4.8*  --   --    ALB  --  2.8* 2.7*  --  3.4*   TBILI  --   --   --   --  0.5   ALT  --   --   --   --  28   INR 1.6* 1.5* 1.3*   < >  --     < > = values in this interval not displayed.        Signed: Mook Faustin MD

## 2020-10-30 NOTE — PROGRESS NOTES
0700: Bedside shift change report given to Rosmery Singh RN (oncoming nurse) by Cornelio Mack RN (offgoing nurse). Report included the following information SBAR.

## 2020-10-30 NOTE — PROGRESS NOTES
Pharmacy Daily Dosing of Warfarin    Indication: H/o DVT  Goal INR: 2-3    PTA Warfarin Dose: 2.5 mg PO daily    Concurrent anticoagulants: None  Concurrent antiplatelet: Aspirin 81 mg daily    Major Interacting Medications   Drugs that may increase INR: None  Drugs that may decrease INR: None    Conditions that may increase/decrease INR (CHF, C. diff, cirrhosis, thyroid disorder, hypoalbuminemia): None    Labs:  Recent Labs     10/30/20  0252 10/29/20  2146 10/29/20  1250   INR 1.3* 1.2*  --    HGB 7.5*  --  9.1*     --  289   TBILI  --   --  0.5   ALB 2.7*  --  3.4*           Impression/Plan:   Will order warfarin 3 mg PO x 1 dose. Daily INR  CBC w/o diff every other day      Pharmacy will follow daily and adjust the dose as appropriate. Thanks  Carli Reece, PHARMD      [email or web address]%[email or web address]. pdf

## 2020-10-30 NOTE — PROGRESS NOTES
Problem: Falls - Risk of  Goal: *Absence of Falls  Description: Document Wesley Valencia Fall Risk and appropriate interventions in the flowsheet.   Outcome: Progressing Towards Goal  Note: Fall Risk Interventions:            Medication Interventions: Assess postural VS orthostatic hypotension, Bed/chair exit alarm, Patient to call before getting OOB, Teach patient to arise slowly         History of Falls Interventions: Bed/chair exit alarm, Door open when patient unattended, Room close to nurse's station, Utilize gait belt for transfer/ambulation, Assess for delayed presentation/identification of injury for 48 hrs (comment for end date), Vital signs minimum Q4HRs X 24 hrs (comment for end date)         Problem: Hypertension  Goal: *Blood pressure within specified parameters  Outcome: Progressing Towards Goal

## 2020-10-30 NOTE — PROGRESS NOTES
Problem: Falls - Risk of  Goal: *Absence of Falls  Description: Document Nirmala Le Fall Risk and appropriate interventions in the flowsheet.   10/30/2020 0517 by Mayo Bryant, RN  Outcome: Progressing Towards Goal  Note: Fall Risk Interventions:            Medication Interventions: Bed/chair exit alarm, Patient to call before getting OOB, Teach patient to arise slowly         History of Falls Interventions: Bed/chair exit alarm      10/30/2020 0122 by Mayo Bryant, MANUELITO  Outcome: Progressing Towards Goal  Note: Fall Risk Interventions:            Medication Interventions: Bed/chair exit alarm, Patient to call before getting OOB, Teach patient to arise slowly

## 2020-10-31 LAB
ALBUMIN SERPL-MCNC: 2.8 G/DL (ref 3.5–5)
ANION GAP SERPL CALC-SCNC: 9 MMOL/L (ref 5–15)
BACTERIA SPEC CULT: NORMAL
BASOPHILS # BLD: 0.1 K/UL (ref 0–0.1)
BASOPHILS NFR BLD: 1 % (ref 0–1)
BUN SERPL-MCNC: 40 MG/DL (ref 6–20)
BUN/CREAT SERPL: 4 (ref 12–20)
CALCIUM SERPL-MCNC: 7.6 MG/DL (ref 8.5–10.1)
CHLORIDE SERPL-SCNC: 97 MMOL/L (ref 97–108)
CO2 SERPL-SCNC: 27 MMOL/L (ref 21–32)
CREAT SERPL-MCNC: 9.06 MG/DL (ref 0.7–1.3)
DIFFERENTIAL METHOD BLD: ABNORMAL
EOSINOPHIL # BLD: 0.3 K/UL (ref 0–0.4)
EOSINOPHIL NFR BLD: 4 % (ref 0–7)
ERYTHROCYTE [DISTWIDTH] IN BLOOD BY AUTOMATED COUNT: 18 % (ref 11.5–14.5)
GLUCOSE SERPL-MCNC: 80 MG/DL (ref 65–100)
GRAM STN SPEC: NORMAL
GRAM STN SPEC: NORMAL
HCT VFR BLD AUTO: 22.5 % (ref 36.6–50.3)
HGB BLD-MCNC: 7.4 G/DL (ref 12.1–17)
IMM GRANULOCYTES # BLD AUTO: 0 K/UL (ref 0–0.04)
IMM GRANULOCYTES NFR BLD AUTO: 1 % (ref 0–0.5)
INR PPP: 1.5 (ref 0.9–1.1)
LACTATE BLD-SCNC: 1.43 MMOL/L (ref 0.4–2)
LYMPHOCYTES # BLD: 1.1 K/UL (ref 0.8–3.5)
LYMPHOCYTES NFR BLD: 13 % (ref 12–49)
MCH RBC QN AUTO: 31.9 PG (ref 26–34)
MCHC RBC AUTO-ENTMCNC: 32.9 G/DL (ref 30–36.5)
MCV RBC AUTO: 97 FL (ref 80–99)
MONOCYTES # BLD: 1 K/UL (ref 0–1)
MONOCYTES NFR BLD: 12 % (ref 5–13)
NEUTS SEG # BLD: 5.8 K/UL (ref 1.8–8)
NEUTS SEG NFR BLD: 69 % (ref 32–75)
NRBC # BLD: 0 K/UL (ref 0–0.01)
NRBC BLD-RTO: 0 PER 100 WBC
PHOSPHATE SERPL-MCNC: 5.8 MG/DL (ref 2.6–4.7)
PLATELET # BLD AUTO: 262 K/UL (ref 150–400)
PMV BLD AUTO: 10.6 FL (ref 8.9–12.9)
POTASSIUM SERPL-SCNC: 5 MMOL/L (ref 3.5–5.1)
PROTHROMBIN TIME: 15.6 SEC (ref 9–11.1)
RBC # BLD AUTO: 2.32 M/UL (ref 4.1–5.7)
SERVICE CMNT-IMP: NORMAL
SODIUM SERPL-SCNC: 133 MMOL/L (ref 136–145)
WBC # BLD AUTO: 8.3 K/UL (ref 4.1–11.1)

## 2020-10-31 PROCEDURE — 74011250637 HC RX REV CODE- 250/637: Performed by: INTERNAL MEDICINE

## 2020-10-31 PROCEDURE — 36415 COLL VENOUS BLD VENIPUNCTURE: CPT

## 2020-10-31 PROCEDURE — 90935 HEMODIALYSIS ONE EVALUATION: CPT

## 2020-10-31 PROCEDURE — 85610 PROTHROMBIN TIME: CPT

## 2020-10-31 PROCEDURE — 65660000001 HC RM ICU INTERMED STEPDOWN

## 2020-10-31 PROCEDURE — 80069 RENAL FUNCTION PANEL: CPT

## 2020-10-31 PROCEDURE — 74011000258 HC RX REV CODE- 258: Performed by: INTERNAL MEDICINE

## 2020-10-31 PROCEDURE — 74011250636 HC RX REV CODE- 250/636: Performed by: INTERNAL MEDICINE

## 2020-10-31 PROCEDURE — 85025 COMPLETE CBC W/AUTO DIFF WBC: CPT

## 2020-10-31 RX ADMIN — PANTOPRAZOLE SODIUM 40 MG: 40 TABLET, DELAYED RELEASE ORAL at 07:35

## 2020-10-31 RX ADMIN — MORPHINE SULFATE 2 MG: 2 INJECTION, SOLUTION INTRAMUSCULAR; INTRAVENOUS at 07:35

## 2020-10-31 RX ADMIN — CALCITRIOL CAPSULES 0.25 MCG 0.5 MCG: 0.25 CAPSULE ORAL at 09:27

## 2020-10-31 RX ADMIN — EPOETIN ALFA-EPBX 10000 UNITS: 10000 INJECTION, SOLUTION INTRAVENOUS; SUBCUTANEOUS at 23:07

## 2020-10-31 RX ADMIN — MORPHINE SULFATE 2 MG: 2 INJECTION, SOLUTION INTRAMUSCULAR; INTRAVENOUS at 23:57

## 2020-10-31 RX ADMIN — VANCOMYCIN HYDROCHLORIDE 500 MG: 500 INJECTION, POWDER, LYOPHILIZED, FOR SOLUTION INTRAVENOUS at 23:01

## 2020-10-31 RX ADMIN — CALCIUM ACETATE 1334 MG: 667 CAPSULE ORAL at 12:15

## 2020-10-31 RX ADMIN — MORPHINE SULFATE 2 MG: 2 INJECTION, SOLUTION INTRAMUSCULAR; INTRAVENOUS at 19:52

## 2020-10-31 RX ADMIN — CALCIUM ACETATE 1334 MG: 667 CAPSULE ORAL at 07:35

## 2020-10-31 RX ADMIN — METOPROLOL SUCCINATE 12.5 MG: 25 TABLET, EXTENDED RELEASE ORAL at 09:27

## 2020-10-31 RX ADMIN — ASPIRIN 81 MG CHEWABLE TABLET 81 MG: 81 TABLET CHEWABLE at 09:28

## 2020-10-31 RX ADMIN — CALCIUM ACETATE 1334 MG: 667 CAPSULE ORAL at 21:06

## 2020-10-31 RX ADMIN — HYDROCODONE BITARTRATE AND ACETAMINOPHEN 1 TABLET: 10; 325 TABLET ORAL at 21:06

## 2020-10-31 RX ADMIN — MORPHINE SULFATE 2 MG: 2 INJECTION, SOLUTION INTRAMUSCULAR; INTRAVENOUS at 15:15

## 2020-10-31 RX ADMIN — MORPHINE SULFATE 2 MG: 2 INJECTION, SOLUTION INTRAMUSCULAR; INTRAVENOUS at 01:47

## 2020-10-31 NOTE — PROGRESS NOTES
Reason for Admission:   Hyperkalemia, ESRD, and CAD                  RUR Score:   28 Moderate risk for readmission               PCP: First and Last name:  Dr. Glendy Veronica   Name of Practice:    Are you a current patient: Yes/No: Yes   Approximate date of last visit: 2 months ago   Can you participate in a virtual visit if needed: Yes    Do you (patient/family) have any concerns for transition/discharge? None              Plan for utilizing home health:   Pt has had home health in the past. Pt will not be utilizing home health at d/c. Current Advanced Directive/Advance Care Plan:  Pt is FULL Code status. Pt does not have an ACP on file. CM addressed with pt about AMD. Pt did not want to address AMD at this time. Transition of Care Plan:        Home   Follow-up Appointments  2nd IM Letter    CM met with pt at bedside to discuss d/c plan. Pt is alert and oriented. CM verified pt's demographics, insurance and PCP. Pt is a 40 y/o [place] American male admitted to [facility] on 10/29/2020 for hyperkalemia, ESRD and CAD. Pt's PCP is Dr. Glendy Veronica. Pt sees PCP once a month. Pt uses [facility] pharmacy on [address] [place] [address] for Rx. Pt resides with his sister in an one level duplex with [address]. Pt is independent with ADL's and IADL's. Pt does drive. Pt has an dialysis machine. Pt does home HD dialysis 5 times a week. Pt has home health in the past. No SNF or IPR in the past. Pt is FULL code status. Pt does not have an ACP on file. Pt's sister Chaya Tirado will transport at d/c. If she is not able to transport, pt will need transport through Medicaid. CM inquired with pt about home health. Pt declined HH. Care Management Interventions  PCP Verified by CM: Yes(Pt's PCP is Dr. Glendy Veronica. Pt sees PCP once a month. )  Palliative Care Criteria Met (RRAT>21 & CHF Dx)?: No  Mode of Transport at Discharge: Other (see comment)(Pt's sister Chaya Tirado will transport at d/c.  If she is not able to transport, pt will need transport through [facility].)  Transition of Care Consult (CM Consult): Discharge Planning(Home with follow-up appointments)  Discharge Durable Medical Equipment: No(Pt has an dialysis machine.)  Physical Therapy Consult: No  Occupational Therapy Consult: No  Speech Therapy Consult: No  Current Support Network: Relative's Home(Pt resides with his sister in an one level duplex with [address].)  Confirm Follow Up Transport: Self(Pt does drive)  [employer] Provided?: No  Discharge Location  Discharge Placement: (Home with Follow-up Appointments)    CM will continue to follow patient for discharge planning needs and arrange for services as deemed necessary.     Tata Flores [address] [place] [address]  [phone]

## 2020-10-31 NOTE — PROGRESS NOTES
0700: Bedside shift change report given to Quin Hernadez RN (oncoming nurse) by Dasha Rodriguez RN (offgoing nurse). Report included the following information SBAR.

## 2020-10-31 NOTE — PROGRESS NOTES
[address] END OF SHIFT REPORT      Bedside shift change report GIVEN TO Jarret Patel RN. Report included the following information SBAR and Kardex. SIGNIFICANT CHANGES DURING SHIFT:      No stools overnight. Unable to obtain Cdiff. CONCERNS TO ADDRESS WITH MD:  None. [address] NURSING NOTE   Admission Date 10/29/2020   Admission Diagnosis Hyperkalemia [E87.5]  ESRD (end stage renal disease) ([facility] [address] [N18.6]  CAD (coronary artery disease) [I25.10]   Consults IP CONSULT TO NEPHROLOGY      Cardiac Monitoring [x] Yes [] No      Purposeful Hourly Rounding [x] Yes    Easton Score Total Score: 1   Easton score 3 or > [] Bed Alarm [] Avasys [] 1:1 sitter [] Patient refused (Signed refusal form in chart)   Yong Score Yong Score: 19   Yong score 14 or < [] PMT consult [] Wound Care consult    []  Specialty bed  [] Nutrition consult      Influenza Vaccine             Oxygen needs? [x] Room air Oxygen @  []1L    []2L    []3L   []4L    []5L   []6L via  NC   Chronic home O2 use?  [] Yes [x] No  Perform O2 challenge test and document in progress note using smartphTapBookAuthore (.Homeoxygen)      Last bowel movement Last Bowel Movement Date: 10/30/20      Urinary Catheter             LDAs               Peripheral IV 44/04/02 Right Basilic (Active)   Site Assessment Clean, dry, & intact 10/31/20 0000   Phlebitis Assessment 0 10/31/20 0000   Infiltration Assessment 0 10/31/20 0000   Dressing Status Clean, dry, & intact 10/31/20 0000   Dressing Type Transparent;Tape 10/31/20 0000   Hub Color/Line Status Flushed;Green 10/31/20 0000   Action Taken Open ports on tubing capped 10/29/20 1824   Alcohol Cap Used Yes 10/29/20 1824       Peripheral IV 69/01/21 Right Basilic (Active)   Site Assessment Clean, dry, & intact 10/31/20 0000   Phlebitis Assessment 0 10/31/20 0000   Infiltration Assessment 0 10/31/20 0000   Dressing Status Clean, dry, & intact 10/31/20 0000   Dressing Type Transparent;Tape 10/31/20 0000   Hub Color/Line Status Flushed;Pink 10/31/20 0000   Action Taken Open ports on tubing capped 10/29/20 1824   Alcohol Cap Used Yes 10/29/20 1824                         Readmission Risk Assessment Tool Score Medium Risk            18       Total Score        4 IP Visits Last 12 Months (1-3=4, 4=9, >4=11)    9 Pt. Coverage (Medicare=5 , Medicaid, or Self-Pay=4)    5 Charlson Comorbidity Score (Age + Comorbid Conditions)        Criteria that do not apply:    Has Seen PCP in Last 6 Months (Yes=3, No=0)    . Living with Significant Other. Assisted Living. LTAC. SNF.  or   Rehab    Patient Length of Stay (>5 days = 3)       Expected Length of Stay 4d 19h   Actual Length of Stay 2

## 2020-10-31 NOTE — PROCEDURES
Winter Dialysis Team [facility]  [phone]    Vitals   Pre   Post   Assessment   Pre   Post     Temp  Temp: 99 °F (37.2 °C) (10/31/20 1700)  98.9 LOC  A/O x 3 A/O x 3   HR   Pulse (Heart Rate): 80 (10/31/20 1915) 80 Lungs   CTA  CTA   B/P   BP: (!) 156/85(resting, eyes closed) (10/31/20 1915) 151/75 Cardiac   s1s2  s1s2   Resp   Resp Rate: 18 (10/31/20 1700) 16 Skin   Intact AV-Graft  Intact Graft   Pain level  Pain Intensity 1: 6 (10/31/20 1515) 7( RN aware) Edema  Trace LE     Trace LE   Orders:    Duration:   Start:    1700 End:   2030 Total:   3.5 hours   Dialyzer:   Dialyzer/Set Up Inspection: Revaclear (10/31/20 1700)   K Bath:   Dialysate K (mEq/L): 2 (10/31/20 1700)   Ca Bath:   Dialysate CA (mEq/L): 2.5 (10/31/20 1700)   Na/Bicarb:   Dialysate NA (mEq/L): 140 (10/31/20 1700)   Target Fluid Removal:   Goal/Amount of Fluid to Remove (mL): 2500 mL (10/31/20 1700)   Access     Type & Location:   Left upper AV-Graft. + B/T. Cleaned with Alcohol pads. Cannulated x 2 # 15 gauge,1\" needles without difficulty   Labs     Obtained/Reviewed   Critical Results Called   Date when labs were drawn-  Hgb-    HGB   Date Value Ref Range Status   10/31/2020 7.4 (L) 12.1 - 17.0 g/dL Final     K-    Potassium   Date Value Ref Range Status   10/31/2020 5.0 3.5 - 5.1 mmol/L Final     Ca-   Calcium   Date Value Ref Range Status   10/31/2020 7.6 (L) 8.5 - 10.1 MG/DL Final     Bun-   BUN   Date Value Ref Range Status   10/31/2020 40 (H) 6 - 20 MG/DL Final     Creat-   Creatinine   Date Value Ref Range Status   10/31/2020 9.06 (H) 0.70 - 1.30 MG/DL Final        Medications/ Blood Products Given     Name   Dose   Route and Time     Non given                Blood Volume Processed (BVP):    77.2 Net Fluid   Removed:  3000-855=0889 ml   Comments   Time Out Done: 1630  Primary Nurse Rpt Pre:JERAD Bunn RN  Primary Nurse Rpt Post:MANUELITO Anthony Pt HD as prescribed  Tx Summary:Treatment initiated via left upper AV-Graft. Tolerated treatment. All possible blood returned. Fistula needles removed. Hemostasis achieved post hand held pressure. Clean dressing applied. Returned to floor. Endorsed to JERAD morfin RN  Admiting Diagnosis:Hyperkalemia,  CAD,  ESRD  Pt's previous clinic-Home HD  Consent signed - Informed Consent Verified: Yes (10/31/20 1700)  [facility] Consent - N/A  Hepatitis Status- Immune (>10) on 9/15/2020  Machine #- Machine Number: [id number] (10/31/20 1700)  Telemetry status-Yes  Pre-dialysis wt. - Pre-Dialysis Weight: 72.3 kg (159 lb 6.3 oz) (10/29/20 1830)

## 2020-10-31 NOTE — PROGRESS NOTES
Problem: Chronic Renal Failure  Goal: *Fluid and electrolytes stabilized  Outcome: Progressing Towards Goal     Problem: Falls - Risk of  Goal: *Absence of Falls  Description: Document Cheo Sandoval Fall Risk and appropriate interventions in the flowsheet.   Outcome: Progressing Towards Goal  Note: Fall Risk Interventions:            Medication Interventions: Bed/chair exit alarm, Patient to call before getting OOB, Teach patient to arise slowly         History of Falls Interventions: Bed/chair exit alarm         Problem: Hypertension  Goal: *Blood pressure within specified parameters  Outcome: Progressing Towards Goal  Goal: *Fluid volume balance  Outcome: Progressing Towards Goal  Goal: *Labs within defined limits  Outcome: Progressing Towards Goal

## 2020-10-31 NOTE — PROGRESS NOTES
Pharmacy Automatic Renal Dosing Protocol - Antimicrobials    Indication for Antimicrobials: dialysis port infection     Current Regimen of Each Antimicrobial:  Vancomycin 500 mg IV after HD (Start Date 10/29; Day # 3)    Previous Antimicrobial Therapy:  None    Goal Level: VANCOMYCIN TROUGH GOAL RANGE  Vancomycin Trough: 20 - 25 mcg/mL  (AUC: 400 - 600 mg/hr/Liter/day)     Date Dose & Interval Measured (mcg/mL) Extrapolated (mcg/mL)                       Date & time of next level: random level prior to Tuesday (11/3)    Significant Cultures:   10/29 Blood: NGTD x 16 hours - prelim  10/29 Wound: pending    Radiology / Imaging results: (X-ray, CT scan or MRI): none    Paralysis, amputations, malnutrition: none    Labs:  Recent Labs     10/31/20  0155 10/30/20  0252 10/29/20  1250   CREA 9.06* 6.98* 11.10*   BUN 40* 34* 67*   WBC 8.3 8.2 12.0*     Temp (24hrs), Av.5 °F (36.9 °C), Min:98.2 °F (36.8 °C), Max:98.8 °F (37.1 °C)      Is the Patient on Dialysis? Yes: HD days are //    Creatinine Clearance (mL/min): HD    Impression/Plan:   Will continue current regimen as appropriate for indication and renal function. Antimicrobial stop date to be determined     Pharmacy will follow daily and adjust medications as appropriate for renal function and/or serum levels. Thank you,  MICHELE Valladares    Recommended duration of therapy  [email or web address]/Pharmacy/Clinical%20Companion/[email or web address]. docx    Renal Dosing  [email or web address]/Pharmacy/Clinical%20Companion/Renal%[email or web address]. pdf

## 2020-10-31 NOTE — PROGRESS NOTES
Problem: Falls - Risk of  Goal: *Absence of Falls  Description: Document Sussy Neil Fall Risk and appropriate interventions in the flowsheet.   Outcome: Progressing Towards Goal  Note: Fall Risk Interventions:            Medication Interventions: Assess postural VS orthostatic hypotension, Bed/chair exit alarm, Patient to call before getting OOB, Teach patient to arise slowly         History of Falls Interventions: Bed/chair exit alarm, Consult care management for discharge planning, Door open when patient unattended, Room close to nurse's station, Utilize gait belt for transfer/ambulation, Vital signs minimum Q4HRs X 24 hrs (comment for end date)

## 2020-11-01 ENCOUNTER — APPOINTMENT (OUTPATIENT)
Dept: CT IMAGING | Age: 45
DRG: 314 | End: 2020-11-01
Attending: INTERNAL MEDICINE
Payer: MEDICARE

## 2020-11-01 LAB
INR PPP: 1.6 (ref 0.9–1.1)
PROTHROMBIN TIME: 16.4 SEC (ref 9–11.1)

## 2020-11-01 PROCEDURE — 72131 CT LUMBAR SPINE W/O DYE: CPT

## 2020-11-01 PROCEDURE — 36415 COLL VENOUS BLD VENIPUNCTURE: CPT

## 2020-11-01 PROCEDURE — 85610 PROTHROMBIN TIME: CPT

## 2020-11-01 PROCEDURE — 74011250636 HC RX REV CODE- 250/636: Performed by: INTERNAL MEDICINE

## 2020-11-01 PROCEDURE — 74011250637 HC RX REV CODE- 250/637: Performed by: INTERNAL MEDICINE

## 2020-11-01 PROCEDURE — 65660000001 HC RM ICU INTERMED STEPDOWN

## 2020-11-01 PROCEDURE — 99223 1ST HOSP IP/OBS HIGH 75: CPT | Performed by: INTERNAL MEDICINE

## 2020-11-01 RX ADMIN — WARFARIN SODIUM 3 MG: 2 TABLET ORAL at 17:52

## 2020-11-01 RX ADMIN — HYDROCODONE BITARTRATE AND ACETAMINOPHEN 1 TABLET: 10; 325 TABLET ORAL at 02:10

## 2020-11-01 RX ADMIN — HYDROCODONE BITARTRATE AND ACETAMINOPHEN 1 TABLET: 10; 325 TABLET ORAL at 15:09

## 2020-11-01 RX ADMIN — PANTOPRAZOLE SODIUM 40 MG: 40 TABLET, DELAYED RELEASE ORAL at 09:40

## 2020-11-01 RX ADMIN — HYDROCODONE BITARTRATE AND ACETAMINOPHEN 1 TABLET: 10; 325 TABLET ORAL at 08:23

## 2020-11-01 RX ADMIN — CALCITRIOL CAPSULES 0.25 MCG 0.5 MCG: 0.25 CAPSULE ORAL at 09:40

## 2020-11-01 RX ADMIN — CALCIUM ACETATE 1334 MG: 667 CAPSULE ORAL at 09:40

## 2020-11-01 RX ADMIN — ASPIRIN 81 MG CHEWABLE TABLET 81 MG: 81 TABLET CHEWABLE at 09:39

## 2020-11-01 RX ADMIN — HYDROCODONE BITARTRATE AND ACETAMINOPHEN 1 TABLET: 10; 325 TABLET ORAL at 21:09

## 2020-11-01 RX ADMIN — MORPHINE SULFATE 1 MG: 2 INJECTION, SOLUTION INTRAMUSCULAR; INTRAVENOUS at 11:20

## 2020-11-01 RX ADMIN — METOPROLOL SUCCINATE 12.5 MG: 25 TABLET, EXTENDED RELEASE ORAL at 09:40

## 2020-11-01 RX ADMIN — MORPHINE SULFATE 2 MG: 2 INJECTION, SOLUTION INTRAMUSCULAR; INTRAVENOUS at 19:36

## 2020-11-01 RX ADMIN — CALCIUM ACETATE 1334 MG: 667 CAPSULE ORAL at 11:20

## 2020-11-01 RX ADMIN — CALCIUM ACETATE 1334 MG: 667 CAPSULE ORAL at 17:52

## 2020-11-01 NOTE — PROGRESS NOTES
-  Hospitalist Progress Note    NAME: Maribel Crum   :  1975   MRN:  [id number]       Assessment / Plan:  ESRD on HD at [facility]  Sepsis with taph epidermidis bacteremia  Hyperkalemia, s/p IV calcium/insulin/HD  Anemia of CKD  Hx of kidney transplant  S/p removal of femoral Perm cath on 10/29  Using L AVF for HD  Repeat Bcx pending  Empiric IV vancomycin  Epo per nephro, cont' phoslo  ID consulted for abx choice/duration    HTN  CAD  con't home meds: ASA/BB    Diarrhea, resolved  No diarrhea for 24 hrs. H/o left leg DVT and s/p IVC and history of HIT  Subtherapeutic INR  On warfarin, appreciate pharmacy dosing warfarin. Daily INR    Code status: Full  Prophylaxis: Coumadin  Recommended Disposition: Home w/Family     Subjective:     Chief Complaint / Reason for Physician Visit  Pt seen, NAD. No new complaint. Discussed with RN events overnight. Review of Systems:  Symptom Y/N Comments  Symptom Y/N Comments   Fever/Chills    Chest Pain     Poor Appetite    Edema     Cough    Abdominal Pain     Sputum    Joint Pain     SOB/ZAMUDIO    Pruritis/Rash     Nausea/vomit    Tolerating PT/OT     Diarrhea n   Tolerating Diet     Constipation    Other       Could NOT obtain due to:      Objective:     VITALS:   Last 24hrs VS reviewed since prior progress note.  Most recent are:  Patient Vitals for the past 24 hrs:   Temp Pulse Resp BP SpO2   20 0642 98.2 °F (36.8 °C) 90 20 126/76 100 %   20 0359 98.7 °F (37.1 °C) 87 18 108/68 93 %   20 0210 98.9 °F (37.2 °C) 95 20 119/64 96 %   10/31/20 2356 100.2 °F (37.9 °C)       10/31/20 2309 100.1 °F (37.8 °C) (!) 102 22 112/61 94 %   10/31/20 2104  90 24 (!) 99/56 98 %   10/31/20 2030    (!) 159/78    10/31/20 2015  82  (!) 149/72    10/31/20 2000  82  (!) 157/85    10/31/20 1930  80  (!) 142/84    10/31/20 191  80  (!) 156/85    10/31/20 1900  80  (!) 154/84    10/31/20 1845  75  (!) 152/85    10/31/20 1830  80  (!) 160/89  10/31/20 1815  79  (!) 154/92    10/31/20 1800  80  (!) 152/89    10/31/20 1745  83  (!) 154/45    10/31/20 1730  85  (!) 154/89    10/31/20 1715  89  (!) 145/89    10/31/20 1700 99 °F (37.2 °C) 90 18 (!) 152/94    10/31/20 1511 98.6 °F (37 °C) 88 18 129/83 98 %   10/31/20 1125 98.6 °F (37 °C) 87 18 127/80 96 %       Intake/Output Summary (Last 24 hours) at 11/1/2020 0816  Last data filed at 10/31/2020 2030  Gross per 24 hour   Intake    Output 2500 ml   Net -2500 ml        PHYSICAL EXAM:  General: WD, WN. Alert, cooperative, no acute distress    EENT:  EOMI. Anicteric sclerae. MMM  Resp:  CTA bilaterally, no wheezing or rales. No accessory muscle use  CV:  Regular  rhythm,  No edema  GI:  Soft, Non distended, Non tender.  +Bowel sounds  Neurologic:  Alert and oriented X 3, normal speech,   Psych:   Good insight. Not anxious nor agitated  Skin:  No rashes. No jaundice    Reviewed most current lab test results and cultures  YES  Reviewed most current radiology test results   YES  Review and summation of old records today    NO  Reviewed patient's current orders and MAR    YES  PMH/SH reviewed - no change compared to H&P  ________________________________________________________________________  Care Plan discussed with:    Comments   Patient x    Family      RN x    Care Manager     Consultant                        Multidiciplinary team rounds were held today with , nursing, pharmacist and clinical coordinator. Patient's plan of care was discussed; medications were reviewed and discharge planning was addressed.      ________________________________________________________________________  Total NON critical care TIME:  35   Minutes    Total CRITICAL CARE TIME Spent:   Minutes non procedure based      Comments   >50% of visit spent in counseling and coordination of care     ________________________________________________________________________  Arpit Walls MD     Procedures: see electronic medical records for all procedures/Xrays and details which were not copied into this note but were reviewed prior to creation of Plan. LABS:  I reviewed today's most current labs and imaging studies. Pertinent labs include:  Recent Labs     10/31/20  0155 10/30/20  0252 10/29/20  1250   WBC 8.3 8.2 12.0*   HGB 7.4* 7.5* 9.1*   HCT 22.5* 22.3* 27.4*    262 289     Recent Labs     11/01/20  0425 10/31/20  0155 10/30/20  0252  10/29/20  1250   NA  --  133* 137  --  131*   K  --  5.0 4.5  --  6.5*   CL  --  97 99  --  95*   CO2  --  27 29  --  23   GLU  --  80 73  --  77   BUN  --  40* 34*  --  67*   CREA  --  9.06* 6.98*  --  11.10*   CA  --  7.6* 7.3*  --  7.2*   PHOS  --  5.8* 4.8*  --   --    ALB  --  2.8* 2.7*  --  3.4*   TBILI  --   --   --   --  0.5   ALT  --   --   --   --  28   INR 1.6* 1.5* 1.3*   < >  --     < > = values in this interval not displayed.        Signed: Nicole Emanuel MD

## 2020-11-01 NOTE — PROGRESS NOTES
Pharmacy Automatic Renal Dosing Protocol - Antimicrobials    Indication for Antimicrobials: dialysis port infection     Current Regimen of Each Antimicrobial:  Vancomycin 500 mg IV after HD (Start Date 10/29; Day # 4)    Previous Antimicrobial Therapy:  None    Goal Level: VANCOMYCIN TROUGH GOAL RANGE  Vancomycin Trough: 20 - 25 mcg/mL  (AUC: 400 - 600 mg/hr/Liter/day)     Date Dose & Interval Measured (mcg/mL) Extrapolated (mcg/mL)                       Date & time of next level: random level prior to Tuesday (11/3)-not entered at this time    Significant Cultures:   10/29 Blood: NGTD x 16 hours - prelim  10/29 Wound: NGTD - pending    Radiology / Imaging results: (X-ray, CT scan or MRI): none    Paralysis, amputations, malnutrition: none    Labs:  Recent Labs     10/31/20  0155 10/30/20  0252 10/29/20  1250   CREA 9.06* 6.98* 11.10*   BUN 40* 34* 67*   WBC 8.3 8.2 12.0*     Temp (24hrs), Av °F (37.2 °C), Min:98.2 °F (36.8 °C), Max:100.2 °F (37.9 °C)      Is the Patient on Dialysis? Yes: HD days are //    Creatinine Clearance (mL/min): HD    Impression/Plan:   · Will continue current regimen as appropriate for indication and renal function. · Antimicrobial stop date to be determined     Pharmacy will follow daily and adjust medications as appropriate for renal function and/or serum levels. Thank you,  MICHELE Mcgregor    Recommended duration of therapy  [email or web address]/[place]/[facility]/Pharmacy/Clinical%20Companion/Duration%20of%20ABX%20therapy. docx    Renal Dosing  [email or web address]/[place]/[facility]/Pharmacy/Clinical%20Companion/Renal%[email or web address]. pdf

## 2020-11-01 NOTE — PROGRESS NOTES
-  Hospitalist Progress Note    NAME: Lemuel Hansen   :  1975   MRN:  [id number]       Assessment / Plan:  ESRD on HD at [facility]  Sepsis with staph epidermidis bacteremia  Hyperkalemia, s/p IV calcium/insulin/HD  Anemia of CKD  Hx of kidney transplant  S/p removal of femoral Perm cath on 10/29. Tip from R femoral HD cath NTD. Repeat Bcx NTD. Using L AVF for HD  Empiric IV vancomycin  Epo per nephro, cont' phoslo  ID consult pending    HTN  CAD  con't home meds: ASA/BB    Diarrhea, resolved  No diarrhea for 24 hrs. H/o left leg DVT and s/p IVC and history of HIT  Subtherapeutic INR  On warfarin, appreciate pharmacy dosing warfarin. Daily INR    Code status: Full  Prophylaxis: Coumadin  Recommended Disposition: Home w/Family     Subjective:     Chief Complaint / Reason for Physician Visit  Pt seen, NAD. Developed back pain during HD yesterday, now resolved. Discussed with RN events overnight. Review of Systems:  Symptom Y/N Comments  Symptom Y/N Comments   Fever/Chills    Chest Pain     Poor Appetite    Edema     Cough    Abdominal Pain     Sputum    Joint Pain     SOB/ZAMUDIO    Pruritis/Rash     Nausea/vomit    Tolerating PT/OT     Diarrhea n   Tolerating Diet     Constipation    Other       Could NOT obtain due to:      Objective:     VITALS:   Last 24hrs VS reviewed since prior progress note.  Most recent are:  Patient Vitals for the past 24 hrs:   Temp Pulse Resp BP SpO2   20 0642 98.2 °F (36.8 °C) 90 20 126/76 100 %   20 0359 98.7 °F (37.1 °C) 87 18 108/68 93 %   20 0210 98.9 °F (37.2 °C) 95 20 119/64 96 %   10/31/20 2356 100.2 °F (37.9 °C)       10/31/20 2309 100.1 °F (37.8 °C) (!) 102 22 112/61 94 %   10/31/20 2104  90 24 (!) 99/56 98 %   10/31/20 2030    (!) 159/78    10/31/20 2015  82  (!) 149/72    10/31/20 2000  82  (!) 157/85    10/31/20 1930  80  (!) 142/84    10/31/20 1915  80  (!) 156/85    10/31/20 190  80  (!) 154/84    10/31/20 184  75  (!) 152/85    10/31/20 1830  80  (!) 160/89    10/31/20 1815  79  (!) 154/92    10/31/20 1800  80  (!) 152/89    10/31/20 1745  83  (!) 154/45    10/31/20 1730  85  (!) 154/89    10/31/20 1715  89  (!) 145/89    10/31/20 1700 99 °F (37.2 °C) 90 18 (!) 152/94    10/31/20 1511 98.6 °F (37 °C) 88 18 129/83 98 %   10/31/20 1125 98.6 °F (37 °C) 87 18 127/80 96 %       Intake/Output Summary (Last 24 hours) at 11/1/2020 0819  Last data filed at 10/31/2020 2030  Gross per 24 hour   Intake    Output 2500 ml   Net -2500 ml        PHYSICAL EXAM:  General: WD, WN. Alert, cooperative, no acute distress    EENT:  EOMI. Anicteric sclerae. MMM  Resp:  CTA bilaterally, no wheezing or rales. No accessory muscle use  CV:  Regular  rhythm,  No edema  GI:  Soft, Non distended, Non tender.  +Bowel sounds  Neurologic:  Alert and oriented X 3, normal speech,   Psych:   Good insight. Not anxious nor agitated  Skin:  No rashes. No jaundice    Reviewed most current lab test results and cultures  YES  Reviewed most current radiology test results   YES  Review and summation of old records today    NO  Reviewed patient's current orders and MAR    YES  PMH/SH reviewed - no change compared to H&P  ________________________________________________________________________  Care Plan discussed with:    Comments   Patient x    Family      RN x    Care Manager     Consultant                        Multidiciplinary team rounds were held today with , nursing, pharmacist and clinical coordinator. Patient's plan of care was discussed; medications were reviewed and discharge planning was addressed.      ________________________________________________________________________  Total NON critical care TIME:  35   Minutes    Total CRITICAL CARE TIME Spent:   Minutes non procedure based      Comments   >50% of visit spent in counseling and coordination of care ________________________________________________________________________  Arpit Walls MD     Procedures: see electronic medical records for all procedures/Xrays and details which were not copied into this note but were reviewed prior to creation of Plan. LABS:  I reviewed today's most current labs and imaging studies. Pertinent labs include:  Recent Labs     10/31/20  0155 10/30/20  0252 10/29/20  1250   WBC 8.3 8.2 12.0*   HGB 7.4* 7.5* 9.1*   HCT 22.5* 22.3* 27.4*    262 289     Recent Labs     11/01/20  0425 10/31/20  0155 10/30/20  0252  10/29/20  1250   NA  --  133* 137  --  131*   K  --  5.0 4.5  --  6.5*   CL  --  97 99  --  95*   CO2  --  27 29  --  23   GLU  --  80 73  --  77   BUN  --  40* 34*  --  67*   CREA  --  9.06* 6.98*  --  11.10*   CA  --  7.6* 7.3*  --  7.2*   PHOS  --  5.8* 4.8*  --   --    ALB  --  2.8* 2.7*  --  3.4*   TBILI  --   --   --   --  0.5   ALT  --   --   --   --  28   INR 1.6* 1.5* 1.3*   < >  --     < > = values in this interval not displayed.        Signed: Arpit Walls MD

## 2020-11-01 NOTE — PROGRESS NOTES
Problem: Hypertension  Goal: *Blood pressure within specified parameters  Outcome: Progressing Towards Goal     Problem: Arrhythmia Pathway (Adult)  Goal: *Absence of arrhythmia  Outcome: Progressing Towards Goal

## 2020-11-01 NOTE — CONSULTS
Infectious Disease Consult    Impression/Plan   · MRSE bacteremia. Positive blood cultures were drawn 12 days ago on 10/20/2020. Started on vancomycin at dialysis center on 10/24. Suspect due to infected right femoral permacath which was removed 10/29/2020. Repeat blood cultures from 10/29/2020 no growth to date. With history of AICD and endocarditis he is at high risk for CIED infection. Recommend TTE. May need TTE based on clinical course. Further recommendations will be made based on echocardiogram results and results of CT scan of the lumbar spine  · ESRD on hemodialysis via dialysis via left AV graft  · History of AICD and endocarditis (I cannot find details of endocarditis in the chart)  · Back pain. Intermittent in nature. Doubt vertebral infection due to coagulase-negative Staphylococcus. Probably not a candidate for MRI with history of AICD. Will check CT scan, CRP, and sedimentation rate    Anti-infectives:   1. Vancomycin    Subjective:   Date of Consultation:  November 1, 2020  Date of Admission: 10/29/2020   Referring Physician:     Patient is a 39 y.o. male with a past medical history significant for failed kidney transplant, end-stage renal disease on dialysis, and hypertension who was admitted with complaints of back pain, fever, and nausea/vomiting. Patient describes back pain is intermittent and centered directly above lumbar spine. The pain is dull achey, does not radiate, nothing makes it worse or better, and progressively became worse over the day. The nausea and vomiting have resolved    Pt was in ER last week with fever/chills/myalgias. Blood cultures were drawn which returned growing Staph epidermidis. He was not discharged home from the emergency department with antibiotics. He was called 10/23 and instructed to return to the hospital due to positive blood cultures but did not return until 10/29/2020.   He was started on vancomycin in the outpatient setting by his nephrologist at the dialysis center on 10/24. Pt had a right femoral cath placed in sept 2020 for home dialysis. This was removed on the day of admission on 10/29/2020. Blood cultures from 10/29/2020 reveal no growth to date. Patient has a history of AICD and endocarditis. He is currently on vancomycin. The infectious diseases service has been asked to assist with antibiotic management                  Patient Active Problem List   Diagnosis Code    Kidney transplant     Coronary atherosclerosis of native coronary artery I25.10    Nausea & vomiting R11.2    Serum total bilirubin elevated R17    Abdominal pain R10.9    HTN (hypertension) I10    Anemia D64.9    S/P appendectomy Z90.49    High cholesterol E78.00    Other ill-defined conditions(799.89) R69    CAD (coronary artery disease) I25.10    Gastrointestinal disorder K92.9    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[facility]) D69.6    Peritonitis ([facility] K65.9    Malnutrition ([facility] E46    DVT (deep venous thrombosis) ([facility]) I82.409    S/P IVC filter Z95.828    Arterial occlusion I70.90    S/p cadaver renal transplant Z94.0    AICD (automatic cardioverter/defibrillator) present Z95.810    ESRD (end stage renal disease) on dialysis ([facility]) N18.6, Z99.2    Dialysis patient ([facility] Z99.2    Hyperkalemia E87.5    Congestive heart failure, unspecified I50.9    Hypoglycemia E16.2    Sepsis ([facility]) A41.9    Pneumonia J18.9    Abnormal EKG R94.31    ESRD (end stage renal disease) ([facility]) N18.6     Past Medical History:   Diagnosis Date    Abdominal hematoma     AICD (automatic cardioverter/defibrillator) present     CAD (coronary artery disease)     anterior MI s/p 2 stents  2007    Chronic abdominal pain     Chronic kidney disease     ESRD; Dialysis dependent.  Home [facility] does labs- [facility] tpke    DVT (deep venous thrombosis) ([facility] 2001    DVT of popliteal vein ([facility] [address] 11/2011    not anticoagulated due to bleeding, IVC filter    Endocarditis     Gallstone pancreatitis     Gastrointestinal disorder     acid reflux    Gastrointestinal disorder     peptic ulcer    Hemodialysis patient ([facility]     High cholesterol     Hypertension     Kidney transplant     b/l kidneys 1995, 2001    Long term current use of anticoagulant therapy     Nephrotic syndrome     Other ill-defined conditions(799.89)     kidny transplant x2,  on dialysis    Other ill-defined conditions(799.89)     home dialysis    Other ill-defined conditions(799.89)     hx recurrent left leg DVT    Peritonitis ([facility] [address]     Seizures ([facility] [address] 2015    most recent- only had three in lifetime    Small bowel obstruction (HCC)     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    V-tach ([facility] [address]       Family History   Problem Relation Age of Onset    COPD Mother     Lung Disease Mother     Cancer Father         stomach    Cancer Maternal Grandmother       Social History     Tobacco Use    Smoking status: Never Smoker    Smokeless tobacco: Never Used   Substance Use Topics    Alcohol use: No     Past Surgical History:   Procedure Laterality Date    CARDIAC SURG PROCEDURE UNLIST  2007    2 stents    HX APPENDECTOMY      HX CHOLECYSTECTOMY      HX OTHER SURGICAL      cholecystectomy    HX OTHER SURGICAL  11/2011    exploratory laparotomy    HX OTHER SURGICAL      fem-pop    HX OTHER SURGICAL  02/2013    right upper extremity fistula    HX PACEMAKER Left 6/2009    AICD-st latonya-not connected    HX PACEMAKER Left     AICD-left side-BS-working    HX SMALL BOWEL RESECTION      HX TRANSPLANT  2001     Kidney    HX TRANSPLANT  1992    kidney    HX UROLOGICAL      2 kidney transplants    HX VASCULAR ACCESS Right     femoral access for HD- does 5 day dialysis @ home    IR REMOVE TUNL CVAD W/O PORT / PUMP  10/29/2020    IR REPLACE CVC TUNNELED W/O PORT  9/10/2020    RI EDG US EXAM SURGICAL ALTER STOM DUODENUM/JEJUNUM  7/15/2011         RI ESOPHAGOGASTRODUODENOSCOPY TRANSORAL DIAGNOSTIC 2012         VASCULAR SURGERY PROCEDURE UNLIST  2017    Insertion AV graft right upper arm (bovine); ligation of AV fistula right arm      Prior to Admission medications    Medication Sig Start Date End Date Taking? Authorizing Provider   cinacalcet 60 mg tab Take 60 mg by mouth daily. Yes Provider, Historical   atorvastatin (Lipitor) 20 mg tablet Take 20 mg by mouth daily. Yes Provider, Historical   metoprolol succinate (TOPROL-XL) 25 mg XL tablet Take 25 mg by mouth daily. Yes Provider, Historical   warfarin (COUMADIN) 2.5 mg tablet Take 2.5 mg by mouth daily. Yes Provider, Historical   acetaminophen (TYLENOL) 500 mg tablet Take 1 Tab by mouth every four (4) hours as needed for Pain. Over the counter 19  Yes Amber Gillsi MD   sevelamer carbonate (RENVELA) 800 mg tab tab Take 1,600 mg by mouth three (3) times daily (with meals). Only takes if eating a  meal   Yes Provider, Historical   omeprazole (PRILOSEC) 20 mg capsule Take 20 mg by mouth daily. Yes Provider, Historical   calcitRIOL (ROCALTROL) 0.5 mcg capsule Take 0.5 mcg by mouth daily. Yes Provider, Historical   aspirin 81 mg chewable tablet Take 81 mg by mouth daily. Yes Other, MD Morro     Allergies   Allergen Reactions    Shellfish Containing Products Swelling     Break out in rash, trouble breathing    Heparin Analogues Other (comments)     Positive history of HIT        Review of Systems:  A comprehensive review of systems was negative except for that written in the History of Present Illness. Objective:   Blood pressure 120/67, pulse 88, temperature 98.8 °F (37.1 °C), resp. rate 19, height 5' 7\" (1.702 m), weight 153 lb (69.4 kg), SpO2 98 %. Temp (24hrs), Av.1 °F (37.3 °C), Min:98.2 °F (36.8 °C), Max:100.2 °F (37.9 °C)       Exam:    General:  Alert, cooperative, well noursished, well developed, appears stated age   Eyes:  Sclera anicteric. Pupils equally round and reactive to light.    Mouth/Throat: Mucous membranes moist   Neck: Supple   Lungs:   Clear to auscultation anteriorly   CV:  Regular rate and rhythm, no murmur   Abdomen:   Soft, non-tender, nondistended   Extremities:  No edema   Skin:  No rash   Lymph nodes:    Musculoskeletal: No swelling or deformity   Lines/Devices:   LUE AV fistula unremarkable   Psych: Alert and oriented, normal mood affect given the setting       Data Review:   Recent Results (from the past 24 hour(s))   PROTHROMBIN TIME + INR    Collection Time: 11/01/20  4:25 AM   Result Value Ref Range    INR 1.6 (H) 0.9 - 1.1      Prothrombin time 16.4 (H) 9.0 - 11.1 sec        Microbiology:      Studies:      Signed By: Aime Hardin DO     November 1, 2020

## 2020-11-01 NOTE — PROGRESS NOTES
[address] END OF SHIFT REPORT      Bedside shift change report GIVEN TO Latrice Alas RN. Report included the following information SBAR and Kardex. SIGNIFICANT CHANGES DURING SHIFT:  None. CONCERNS TO ADDRESS WITH MD:  None. [address] NURSING NOTE   Admission Date 10/29/2020   Admission Diagnosis Hyperkalemia [E87.5]  ESRD (end stage renal disease) ([facility] [N18.6]  CAD (coronary artery disease) [I25.10]   Consults IP CONSULT TO NEPHROLOGY  IP CONSULT TO INFECTIOUS DISEASES      Cardiac Monitoring [x] Yes [] No      Purposeful Hourly Rounding [x] Yes    Easton Score Total Score: 1   Easton score 3 or > [] Bed Alarm [] Avasys [] 1:1 sitter [] Patient refused (Signed refusal form in chart)   Yong Score Yong Score: 19   Yong score 14 or < [] PMT consult [] Wound Care consult    []  Specialty bed  [] Nutrition consult      Influenza Vaccine             Oxygen needs? [x] Room air Oxygen @  []1L    []2L    []3L   []4L    []5L   []6L via  NC   Chronic home O2 use?  [] Yes [x] No  Perform O2 challenge test and document in progress note using [employer])      Last bowel movement Last Bowel Movement Date: 10/30/20      Urinary Catheter             LDAs               Peripheral IV 75/98/56 Right Basilic (Active)   Site Assessment Clean, dry, & intact 11/01/20 0400   Phlebitis Assessment 0 11/01/20 0400   Infiltration Assessment 0 11/01/20 0400   Dressing Status Clean, dry, & intact 11/01/20 0400   Dressing Type Transparent;Tape 11/01/20 0400   Hub Color/Line Status Flushed;Green 11/01/20 0400   Action Taken Open ports on tubing capped 10/29/20 1824   Alcohol Cap Used Yes 10/29/20 1824       Peripheral IV 74/75/99 Right Basilic (Active)   Site Assessment Clean, dry, & intact 11/01/20 0400   Phlebitis Assessment 0 11/01/20 0400   Infiltration Assessment 0 11/01/20 0400   Dressing Status Clean, dry, & intact 11/01/20 0400   Dressing Type Transparent;Tape 11/01/20 0400   Hub Color/Line Status Flushed;Pink 11/01/20 0400   Action Taken Open ports on tubing capped 10/29/20 1824   Alcohol Cap Used Yes 10/29/20 1824                         Readmission Risk Assessment Tool Score Medium Risk            18       Total Score        4 IP Visits Last 12 Months (1-3=4, 4=9, >4=11)    9 Pt. Coverage (Medicare=5 , Medicaid, or Self-Pay=4)    5 Charlson Comorbidity Score (Age + Comorbid Conditions)        Criteria that do not apply:    Has Seen PCP in Last 6 Months (Yes=3, No=0)    . Living with Significant Other. Assisted Living. LTAC. SNF.  or   Rehab    Patient Length of Stay (>5 days = 3)       Expected Length of Stay 4d 19h   Actual Length of Stay 3

## 2020-11-01 NOTE — PROGRESS NOTES
Problem: Falls - Risk of  Goal: *Absence of Falls  Description: Document Stephen Ruggiero Fall Risk and appropriate interventions in the flowsheet.   Outcome: Progressing Towards Goal  Note: Fall Risk Interventions:            Medication Interventions: Bed/chair exit alarm, Patient to call before getting OOB, Teach patient to arise slowly         History of Falls Interventions: Bed/chair exit alarm         Problem: Hypertension  Goal: *Blood pressure within specified parameters  Outcome: Progressing Towards Goal  Goal: *Fluid volume balance  Outcome: Progressing Towards Goal  Goal: *Labs within defined limits  Outcome: Progressing Towards Goal

## 2020-11-02 ENCOUNTER — APPOINTMENT (OUTPATIENT)
Dept: NON INVASIVE DIAGNOSTICS | Age: 45
DRG: 314 | End: 2020-11-02
Attending: INTERNAL MEDICINE
Payer: MEDICARE

## 2020-11-02 LAB
ANION GAP SERPL CALC-SCNC: 8 MMOL/L (ref 5–15)
BASOPHILS # BLD: 0.1 K/UL (ref 0–0.1)
BASOPHILS NFR BLD: 1 % (ref 0–1)
BUN SERPL-MCNC: 27 MG/DL (ref 6–20)
BUN/CREAT SERPL: 3 (ref 12–20)
CALCIUM SERPL-MCNC: 8.6 MG/DL (ref 8.5–10.1)
CHLORIDE SERPL-SCNC: 95 MMOL/L (ref 97–108)
CO2 SERPL-SCNC: 27 MMOL/L (ref 21–32)
CREAT SERPL-MCNC: 8.76 MG/DL (ref 0.7–1.3)
CRP SERPL-MCNC: 3.84 MG/DL (ref 0–0.6)
DIFFERENTIAL METHOD BLD: ABNORMAL
ECHO AO ROOT DIAM: 3.18 CM
ECHO AV AREA PEAK VELOCITY: 2.35 CM2
ECHO AV AREA/BSA PEAK VELOCITY: 1.3 CM2/M2
ECHO AV CUSP MM: 2.05 CM
ECHO AV PEAK GRADIENT: 6.21 MMHG
ECHO AV PEAK VELOCITY: 124.55 CM/S
ECHO EST RA PRESSURE: 10 MMHG
ECHO LA AREA 4C: 35.09 CM2
ECHO LA MAJOR AXIS: 4.86 CM
ECHO LA MINOR AXIS: 2.71 CM
ECHO LA TO AORTIC ROOT RATIO: 1.78
ECHO LA TO AORTIC ROOT RATIO: 1.78
ECHO LA VOL 2C: 118.86 ML (ref 18–58)
ECHO LA VOL 4C: 137.52 ML (ref 18–58)
ECHO LA VOL BP: 150.23 ML (ref 18–58)
ECHO LA VOL/BSA BIPLANE: 83.82 ML/M2 (ref 16–28)
ECHO LA VOLUME INDEX A2C: 66.32 ML/M2 (ref 16–28)
ECHO LA VOLUME INDEX A4C: 76.73 ML/M2 (ref 16–28)
ECHO LV E' LATERAL VELOCITY: 6.35 CM/S
ECHO LV INTERNAL DIMENSION DIASTOLIC: 6.01 CM (ref 4.2–5.9)
ECHO LV INTERNAL DIMENSION SYSTOLIC: 5.06 CM
ECHO LV IVSD: 1.36 CM (ref 0.6–1)
ECHO LV IVSS: 1.54 CM
ECHO LV MASS 2D: 388.9 G (ref 88–224)
ECHO LV MASS INDEX 2D: 217 G/M2 (ref 49–115)
ECHO LV POSTERIOR WALL DIASTOLIC: 1.44 CM (ref 0.6–1)
ECHO LV POSTERIOR WALL SYSTOLIC: 1.86 CM
ECHO LVOT DIAM: 2.07 CM
ECHO LVOT PEAK GRADIENT: 3.01 MMHG
ECHO LVOT PEAK VELOCITY: 86.76 CM/S
ECHO MV AREA PHT: 5.77 CM2
ECHO MV E DECELERATION TIME (DT): 108.69 MS
ECHO MV E VELOCITY: 101.82 CM/S
ECHO MV E/E' LATERAL: 16.03
ECHO MV PRESSURE HALF TIME (PHT): 38.16 MS
ECHO PV MAX VELOCITY: 97.9 CM/S
ECHO PV PEAK INSTANTANEOUS GRADIENT SYSTOLIC: 3.83 MMHG
ECHO RIGHT VENTRICULAR SYSTOLIC PRESSURE (RVSP): 49.41 MMHG
ECHO RV INTERNAL DIMENSION: 2.81 CM
ECHO TV REGURGITANT MAX VELOCITY: 313.87 CM/S
ECHO TV REGURGITANT MAX VELOCITY: 335.59 CM/S
ECHO TV REGURGITANT PEAK GRADIENT: 39.41 MMHG
EOSINOPHIL # BLD: 0.3 K/UL (ref 0–0.4)
EOSINOPHIL NFR BLD: 3 % (ref 0–7)
ERYTHROCYTE [DISTWIDTH] IN BLOOD BY AUTOMATED COUNT: 18.1 % (ref 11.5–14.5)
ERYTHROCYTE [SEDIMENTATION RATE] IN BLOOD: 65 MM/HR (ref 0–15)
GLUCOSE SERPL-MCNC: 81 MG/DL (ref 65–100)
HCT VFR BLD AUTO: 24.1 % (ref 36.6–50.3)
HGB BLD-MCNC: 8 G/DL (ref 12.1–17)
IMM GRANULOCYTES # BLD AUTO: 0 K/UL (ref 0–0.04)
IMM GRANULOCYTES NFR BLD AUTO: 0 % (ref 0–0.5)
INR PPP: 1.6 (ref 0.9–1.1)
LYMPHOCYTES # BLD: 1.1 K/UL (ref 0.8–3.5)
LYMPHOCYTES NFR BLD: 11 % (ref 12–49)
MCH RBC QN AUTO: 33.1 PG (ref 26–34)
MCHC RBC AUTO-ENTMCNC: 33.2 G/DL (ref 30–36.5)
MCV RBC AUTO: 99.6 FL (ref 80–99)
MONOCYTES # BLD: 1.1 K/UL (ref 0–1)
MONOCYTES NFR BLD: 11 % (ref 5–13)
NEUTS SEG # BLD: 7.4 K/UL (ref 1.8–8)
NEUTS SEG NFR BLD: 74 % (ref 32–75)
NRBC # BLD: 0 K/UL (ref 0–0.01)
NRBC BLD-RTO: 0 PER 100 WBC
PLATELET # BLD AUTO: 271 K/UL (ref 150–400)
PMV BLD AUTO: 10.3 FL (ref 8.9–12.9)
POTASSIUM SERPL-SCNC: 5.4 MMOL/L (ref 3.5–5.1)
PROTHROMBIN TIME: 16.5 SEC (ref 9–11.1)
RBC # BLD AUTO: 2.42 M/UL (ref 4.1–5.7)
SODIUM SERPL-SCNC: 130 MMOL/L (ref 136–145)
WBC # BLD AUTO: 9.9 K/UL (ref 4.1–11.1)

## 2020-11-02 PROCEDURE — 74011250636 HC RX REV CODE- 250/636: Performed by: INTERNAL MEDICINE

## 2020-11-02 PROCEDURE — 74011250637 HC RX REV CODE- 250/637: Performed by: INTERNAL MEDICINE

## 2020-11-02 PROCEDURE — 36415 COLL VENOUS BLD VENIPUNCTURE: CPT

## 2020-11-02 PROCEDURE — 80048 BASIC METABOLIC PNL TOTAL CA: CPT

## 2020-11-02 PROCEDURE — 86140 C-REACTIVE PROTEIN: CPT

## 2020-11-02 PROCEDURE — 65660000001 HC RM ICU INTERMED STEPDOWN

## 2020-11-02 PROCEDURE — 93306 TTE W/DOPPLER COMPLETE: CPT

## 2020-11-02 PROCEDURE — 85025 COMPLETE CBC W/AUTO DIFF WBC: CPT

## 2020-11-02 PROCEDURE — 85610 PROTHROMBIN TIME: CPT

## 2020-11-02 PROCEDURE — 85652 RBC SED RATE AUTOMATED: CPT

## 2020-11-02 RX ORDER — SODIUM POLYSTYRENE SULFONATE 15 G/60ML
15 SUSPENSION ORAL; RECTAL
Status: COMPLETED | OUTPATIENT
Start: 2020-11-02 | End: 2020-11-02

## 2020-11-02 RX ORDER — WARFARIN 2 MG/1
4 TABLET ORAL ONCE
Status: COMPLETED | OUTPATIENT
Start: 2020-11-02 | End: 2020-11-02

## 2020-11-02 RX ADMIN — CALCIUM ACETATE 1334 MG: 667 CAPSULE ORAL at 08:28

## 2020-11-02 RX ADMIN — SODIUM POLYSTYRENE SULFONATE 15 G: 15 SUSPENSION ORAL; RECTAL at 15:01

## 2020-11-02 RX ADMIN — CALCIUM ACETATE 1334 MG: 667 CAPSULE ORAL at 13:01

## 2020-11-02 RX ADMIN — MORPHINE SULFATE 1 MG: 2 INJECTION, SOLUTION INTRAMUSCULAR; INTRAVENOUS at 19:48

## 2020-11-02 RX ADMIN — METOPROLOL SUCCINATE 12.5 MG: 25 TABLET, EXTENDED RELEASE ORAL at 08:28

## 2020-11-02 RX ADMIN — CALCIUM ACETATE 1334 MG: 667 CAPSULE ORAL at 17:31

## 2020-11-02 RX ADMIN — HYDROCODONE BITARTRATE AND ACETAMINOPHEN 1 TABLET: 10; 325 TABLET ORAL at 15:01

## 2020-11-02 RX ADMIN — HYDROCODONE BITARTRATE AND ACETAMINOPHEN 1 TABLET: 10; 325 TABLET ORAL at 21:21

## 2020-11-02 RX ADMIN — HYDROCODONE BITARTRATE AND ACETAMINOPHEN 1 TABLET: 10; 325 TABLET ORAL at 08:22

## 2020-11-02 RX ADMIN — WARFARIN SODIUM 4 MG: 2 TABLET ORAL at 17:31

## 2020-11-02 RX ADMIN — MORPHINE SULFATE 1 MG: 2 INJECTION, SOLUTION INTRAMUSCULAR; INTRAVENOUS at 00:31

## 2020-11-02 RX ADMIN — PANTOPRAZOLE SODIUM 40 MG: 40 TABLET, DELAYED RELEASE ORAL at 08:28

## 2020-11-02 RX ADMIN — CALCITRIOL CAPSULES 0.25 MCG 0.5 MCG: 0.25 CAPSULE ORAL at 08:28

## 2020-11-02 RX ADMIN — ASPIRIN 81 MG CHEWABLE TABLET 81 MG: 81 TABLET CHEWABLE at 08:28

## 2020-11-02 NOTE — PROGRESS NOTES
Problem: Falls - Risk of  Goal: *Absence of Falls  Description: Document Kayden Rose Fall Risk and appropriate interventions in the flowsheet.   Outcome: Progressing Towards Goal  Note: Fall Risk Interventions:            Medication Interventions: Bed/chair exit alarm, Evaluate medications/consider consulting pharmacy, Patient to call before getting OOB         History of Falls Interventions: Bed/chair exit alarm, Door open when patient unattended         Problem: Patient Education: Go to Patient Education Activity  Goal: Patient/Family Education  Outcome: Progressing Towards Goal

## 2020-11-02 NOTE — PROGRESS NOTES
Pharmacy Daily Dosing of Warfarin    Indication: H/o DVT  Goal INR: 2-3    PTA Warfarin Dose: 2.5 mg PO daily    Concurrent anticoagulants: None  Concurrent antiplatelet: Aspirin 81 mg daily    Major Interacting Medications   Drugs that may increase INR: None  Drugs that may decrease INR: None    Conditions that may increase/decrease INR (CHF, C. diff, cirrhosis, thyroid disorder, hypoalbuminemia): None    Labs:  Recent Labs     11/02/20  0038 11/01/20  0425 10/31/20  0155   INR 1.6* 1.6* 1.5*   HGB 8.0*  --  7.4*     --  262   ALB  --   --  2.8*           Impression/Plan:   Will order warfarin 4 mg PO x 1 dose. Daily INR  CBC w/o diff every other day      Pharmacy will follow daily and adjust the dose as appropriate. Thanks  Sheryl Diana PHARMD      [email or web address]%[email or web address]. pdf

## 2020-11-02 NOTE — PROGRESS NOTES
-  Hospitalist Progress Note    NAME: Tiana Jones   :  1975   MRN:  [id number]       Assessment / Plan:  ESRD on HD at [facility]  Sepsis with staph epidermidis bacteremia  Hx of endocarditis  Hyperkalemia, s/p IV calcium/insulin/HD. K 5.4 today, will give kayexalate. HD tomorrow  Anemia of CKD  Hx of kidney transplant  Back pain, new onset while inpt  S/p removal of femoral Perm cath on 10/29. Tip from R femoral HD cath NTD. Repeat Bcx NTD. CT L spine showed irregular lucency around L1-2 endplate. However no paraspinal fluid collection or clear paraspinal inflammation is identified. Discitis/osteomyelitis is not  excluded. TTE pending, may need EVER  Using L AVF for HD  Cont' IV vancomycin  Epo per nephro, cont' phoslo  ID following    HTN  CAD s/p AICE  con't home meds: ASA/BB    Diarrhea, resolved  No diarrhea for 24 hrs. H/o left leg DVT and s/p IVC and history of HIT  Subtherapeutic INR  On warfarin, appreciate pharmacy dosing warfarin. Daily INR    Code status: Full  Prophylaxis: Coumadin  Recommended Disposition: Home w/Family     Subjective:     Chief Complaint / Reason for Physician Visit  Pt seen, NAD. He has no back pain currently but did had severe episode of back pain again yesterday after CT was done. Discussed with RN events overnight. Review of Systems:  Symptom Y/N Comments  Symptom Y/N Comments   Fever/Chills    Chest Pain     Poor Appetite    Edema     Cough    Abdominal Pain     Sputum    Joint Pain n    SOB/ZAMUDIO    Pruritis/Rash     Nausea/vomit    Tolerating PT/OT     Diarrhea n   Tolerating Diet     Constipation    Other       Could NOT obtain due to:      Objective:     VITALS:   Last 24hrs VS reviewed since prior progress note.  Most recent are:  Patient Vitals for the past 24 hrs:   Temp Pulse Resp BP SpO2   20 1126 99 °F (37.2 °C) 89 18 134/89 99 %   20 0828  95      20 0637 98.9 °F (37.2 °C) 96 18 136/86 97 %   20 0238 99.5 °F (37.5 °C) 97 20 119/72 95 %   11/01/20 2256 99.1 °F (37.3 °C) (!) 101 20 127/76 100 %   11/01/20 1845 99.8 °F (37.7 °C) 90 18 123/75 98 %   11/01/20 1508 98.6 °F (37 °C) 84 18 125/80 100 %       Intake/Output Summary (Last 24 hours) at 11/2/2020 1403  Last data filed at 11/2/2020 8582  Gross per 24 hour   Intake 240 ml   Output    Net 240 ml        PHYSICAL EXAM:  General: WD, WN. Alert, cooperative, no acute distress    EENT:  EOMI. Anicteric sclerae. MMM  Resp:  CTA bilaterally, no wheezing or rales. No accessory muscle use  CV:  Regular  rhythm,  No edema  GI:  Soft, Non distended, Non tender.  +Bowel sounds  Neurologic:  Alert and oriented X 3, normal speech,   Psych:   Good insight. Not anxious nor agitated  Skin:  No rashes. No jaundice    Reviewed most current lab test results and cultures  YES  Reviewed most current radiology test results   YES  Review and summation of old records today    NO  Reviewed patient's current orders and MAR    YES  PMH/ reviewed - no change compared to H&P  ________________________________________________________________________  Care Plan discussed with:    Comments   Patient x    Family      RN x    Care Manager     Consultant                        Multidiciplinary team rounds were held today with , nursing, pharmacist and clinical coordinator. Patient's plan of care was discussed; medications were reviewed and discharge planning was addressed. ________________________________________________________________________  Total NON critical care TIME:  35   Minutes    Total CRITICAL CARE TIME Spent:   Minutes non procedure based      Comments   >50% of visit spent in counseling and coordination of care     ________________________________________________________________________  Arpit Walls MD     Procedures: see electronic medical records for all procedures/Xrays and details which were not copied into this note but were reviewed prior to creation of Plan.       LABS:  I reviewed today's most current labs and imaging studies.   Pertinent labs include:  Recent Labs     11/02/20  0038 10/31/20  0155   WBC 9.9 8.3   HGB 8.0* 7.4*   HCT 24.1* 22.5*    262     Recent Labs     11/02/20  0038 11/01/20  0425 10/31/20  0155   *  --  133*   K 5.4*  --  5.0   CL 95*  --  97   CO2 27  --  27   GLU 81  --  80   BUN 27*  --  40*   CREA 8.76*  --  9.06*   CA 8.6  --  7.6*   PHOS  --   --  5.8*   ALB  --   --  2.8*   INR 1.6* 1.6* 1.5*       Signed: Jennifer Orr MD

## 2020-11-02 NOTE — PROGRESS NOTES
Bedside shift change report given to Mike Rose (oncoming nurse) by Nimesh Dewitt (offgoing nurse). Report included the following information SBAR, Kardex and MAR.

## 2020-11-02 NOTE — PROGRESS NOTES
Nephrology Progress Note  Blaise Leblanc     [email or web address]  Phone - [phone]   Patient: Lorena He    YOB: 1975     Admit Date: 10/29/2020   Date- 11/2/2020     CC: Follow up for esrd          IMPRESSION & PLAN:   · ESRD - home hd- F/B [facility] nephrologist  · Sepsis - staph epi. S/p femoral hd cath removal  · Anemia of ckd  · Sec. hyperpara  · Hx of renal tx inpast.  · Hypertension  · CAD  · H/o DVT, s/p ivc filter/ h/o HIT  · hyperkalemia    PLAN-   No hd today   Continue hd tts   Continue phoslol   Continue epogen   Continue calcitriol     Subjective: Interval History:   -  Bp stable. Fem permacath removed  No c/o sob,  No c/o chest pain,   No c/o nausea or vomiting, No c/o  fever. ROS:- As documented above  Objective:   Vitals:    11/02/20 0238 11/02/20 0637 11/02/20 0828 11/02/20 1126   BP: 119/72 136/86  134/89   Pulse: 97 96 95 89   Resp: 20 18  18   Temp: 99.5 °F (37.5 °C) 98.9 °F (37.2 °C)  99 °F (37.2 °C)   TempSrc:       SpO2: 95% 97%  99%   Weight: 68.3 kg (150 lb 9.2 oz)      Height:          No intake/output data recorded. Last 3 Recorded Weights in this Encounter    10/31/20 0344 11/01/20 0359 11/02/20 0238   Weight: 71.4 kg (157 lb 4.8 oz) 69.4 kg (153 lb) 68.3 kg (150 lb 9.2 oz)      Physical exam:   GEN: NAD  NECK- Supple, no mass  RESP: No wheezing, Clear b/l  CVS: S1,S2  RRR  NEURO: Normal speech, Non focal  Abdo- soft, NT  EXT: left arm avg +  PSYCH: Normal Mood    Chart reviewed. Pertinent Notes reviewed. Data Review :  Recent Labs     11/02/20  0038 10/31/20  0155   * 133*   K 5.4* 5.0   CL 95* 97   CO2 27 27   BUN 27* 40*   CREA 8.76* 9.06*   GLU 81 80   CA 8.6 7.6*   PHOS  --  5.8*     Recent Labs     11/02/20  0038 10/31/20  0155   WBC 9.9 8.3   HGB 8.0* 7.4*   HCT 24.1* 22.5*    262     No results for input(s): FE, TIBC, PSAT, FERR in the last 72 hours.    Medication list  reviewed  Current Facility-Administered Medications   Medication Dose Route Frequency    warfarin (COUMADIN) tablet 4 mg  4 mg Oral ONCE    [START ON 11/3/2020] Vancomycin Level- Please draw with AM labs 11/3/20  1 Each Other ONCE    WARFARIN INFORMATION NOTE (COUMADIN)   Other QPM    calcium acetate(phosphat bind) (PHOSLO) capsule 1,334 mg  2 Cap Oral TID WITH MEALS    epoetin emilie-epbx (RETACRIT) injection 10,000 Units  10,000 Units SubCUTAneous Q TUE, THU & SAT    acetaminophen (TYLENOL) tablet 500 mg  500 mg Oral Q4H PRN    aspirin chewable tablet 81 mg  81 mg Oral DAILY    calcitRIOL (ROCALTROL) capsule 0.5 mcg  0.5 mcg Oral DAILY    metoprolol succinate (TOPROL-XL) XL tablet 12.5 mg  12.5 mg Oral DAILY    pantoprazole (PROTONIX) tablet 40 mg  40 mg Oral ACB    ondansetron (ZOFRAN) injection 4 mg  4 mg IntraVENous Q6H PRN    morphine injection 1-2 mg  1-2 mg IntraVENous Q4H PRN    Vancomycin - Pharmacy to Dose   Other Rx Dosing/Monitoring    traMADoL (ULTRAM) tablet 50 mg  50 mg Oral Q6H PRN    HYDROcodone-acetaminophen (NORCO)  mg tablet 1 Tab  1 Tab Oral Q6H PRN          Marge Horn MD              [facility]  [facility] [place] [address], Sergio [place]  [place], [address]  Phone - [phone]               Fax - [phone]

## 2020-11-03 LAB
ALBUMIN SERPL-MCNC: 2.8 G/DL (ref 3.5–5)
ANION GAP SERPL CALC-SCNC: 8 MMOL/L (ref 5–15)
BACTERIA SPEC CULT: NORMAL
BUN SERPL-MCNC: 36 MG/DL (ref 6–20)
BUN/CREAT SERPL: 3 (ref 12–20)
CALCIUM SERPL-MCNC: 8.6 MG/DL (ref 8.5–10.1)
CHLORIDE SERPL-SCNC: 96 MMOL/L (ref 97–108)
CO2 SERPL-SCNC: 30 MMOL/L (ref 21–32)
CREAT SERPL-MCNC: 11.4 MG/DL (ref 0.7–1.3)
ERYTHROCYTE [DISTWIDTH] IN BLOOD BY AUTOMATED COUNT: 18 % (ref 11.5–14.5)
GLUCOSE SERPL-MCNC: 80 MG/DL (ref 65–100)
HCT VFR BLD AUTO: 28.4 % (ref 36.6–50.3)
HGB BLD-MCNC: 9.2 G/DL (ref 12.1–17)
INR PPP: 1.6 (ref 0.9–1.1)
MCH RBC QN AUTO: 32.7 PG (ref 26–34)
MCHC RBC AUTO-ENTMCNC: 32.4 G/DL (ref 30–36.5)
MCV RBC AUTO: 101.1 FL (ref 80–99)
NRBC # BLD: 0 K/UL (ref 0–0.01)
NRBC BLD-RTO: 0 PER 100 WBC
PHOSPHATE SERPL-MCNC: 6.1 MG/DL (ref 2.6–4.7)
PLATELET # BLD AUTO: 309 K/UL (ref 150–400)
PMV BLD AUTO: 10.4 FL (ref 8.9–12.9)
POTASSIUM SERPL-SCNC: 5.2 MMOL/L (ref 3.5–5.1)
PROTHROMBIN TIME: 16.7 SEC (ref 9–11.1)
RBC # BLD AUTO: 2.81 M/UL (ref 4.1–5.7)
SERVICE CMNT-IMP: NORMAL
SODIUM SERPL-SCNC: 134 MMOL/L (ref 136–145)
VANCOMYCIN SERPL-MCNC: 26 UG/ML
WBC # BLD AUTO: 8.7 K/UL (ref 4.1–11.1)

## 2020-11-03 PROCEDURE — 80069 RENAL FUNCTION PANEL: CPT

## 2020-11-03 PROCEDURE — 36415 COLL VENOUS BLD VENIPUNCTURE: CPT

## 2020-11-03 PROCEDURE — 74011000258 HC RX REV CODE- 258: Performed by: INTERNAL MEDICINE

## 2020-11-03 PROCEDURE — 74011250637 HC RX REV CODE- 250/637: Performed by: INTERNAL MEDICINE

## 2020-11-03 PROCEDURE — 80202 ASSAY OF VANCOMYCIN: CPT

## 2020-11-03 PROCEDURE — 2709999900 HC NON-CHARGEABLE SUPPLY

## 2020-11-03 PROCEDURE — 90935 HEMODIALYSIS ONE EVALUATION: CPT

## 2020-11-03 PROCEDURE — 74011250637 HC RX REV CODE- 250/637: Performed by: STUDENT IN AN ORGANIZED HEALTH CARE EDUCATION/TRAINING PROGRAM

## 2020-11-03 PROCEDURE — 65660000001 HC RM ICU INTERMED STEPDOWN

## 2020-11-03 PROCEDURE — 74011250636 HC RX REV CODE- 250/636: Performed by: INTERNAL MEDICINE

## 2020-11-03 PROCEDURE — 99232 SBSQ HOSP IP/OBS MODERATE 35: CPT | Performed by: INTERNAL MEDICINE

## 2020-11-03 PROCEDURE — 85610 PROTHROMBIN TIME: CPT

## 2020-11-03 PROCEDURE — 85027 COMPLETE CBC AUTOMATED: CPT

## 2020-11-03 RX ORDER — WARFARIN SODIUM 5 MG/1
5 TABLET ORAL ONCE
Status: COMPLETED | OUTPATIENT
Start: 2020-11-03 | End: 2020-11-03

## 2020-11-03 RX ADMIN — CALCIUM ACETATE 1334 MG: 667 CAPSULE ORAL at 17:27

## 2020-11-03 RX ADMIN — PANTOPRAZOLE SODIUM 40 MG: 40 TABLET, DELAYED RELEASE ORAL at 12:32

## 2020-11-03 RX ADMIN — HYDROCODONE BITARTRATE AND ACETAMINOPHEN 1 TABLET: 10; 325 TABLET ORAL at 03:38

## 2020-11-03 RX ADMIN — CALCIUM ACETATE 1334 MG: 667 CAPSULE ORAL at 12:31

## 2020-11-03 RX ADMIN — WARFARIN SODIUM 5 MG: 5 TABLET ORAL at 17:27

## 2020-11-03 RX ADMIN — CALCITRIOL CAPSULES 0.25 MCG 0.5 MCG: 0.25 CAPSULE ORAL at 12:31

## 2020-11-03 RX ADMIN — METOPROLOL SUCCINATE 12.5 MG: 25 TABLET, EXTENDED RELEASE ORAL at 12:31

## 2020-11-03 RX ADMIN — HYDROCODONE BITARTRATE AND ACETAMINOPHEN 1 TABLET: 10; 325 TABLET ORAL at 19:08

## 2020-11-03 RX ADMIN — MORPHINE SULFATE 1 MG: 2 INJECTION, SOLUTION INTRAMUSCULAR; INTRAVENOUS at 23:28

## 2020-11-03 RX ADMIN — VANCOMYCIN HYDROCHLORIDE 500 MG: 500 INJECTION, POWDER, LYOPHILIZED, FOR SOLUTION INTRAVENOUS at 15:14

## 2020-11-03 RX ADMIN — ASPIRIN 81 MG CHEWABLE TABLET 81 MG: 81 TABLET CHEWABLE at 12:31

## 2020-11-03 RX ADMIN — HYDROCODONE BITARTRATE AND ACETAMINOPHEN 1 TABLET: 10; 325 TABLET ORAL at 12:31

## 2020-11-03 RX ADMIN — EPOETIN ALFA-EPBX 10000 UNITS: 10000 INJECTION, SOLUTION INTRAVENOUS; SUBCUTANEOUS at 21:08

## 2020-11-03 RX ADMIN — MORPHINE SULFATE 1 MG: 2 INJECTION, SOLUTION INTRAMUSCULAR; INTRAVENOUS at 08:27

## 2020-11-03 NOTE — PROGRESS NOTES
Hospitalist Progress Note    NAME: Rocky Childers   :  1975   MRN:  [id number]   Room Number:  2203/01  @ [facility]       Interim Hospital Summary: 39 y.o. male whom presented on 10/29/2020 with      Assessment / Plan:  Sepsis POA, resolved due to Staphylococcus epidermidis bacteremia POA   Hx of endocarditis  Positive blood cultures were drawn 12 days ago on 10/20/2020. Started on vancomycin at dialysis center on 10/24. Suspect due to infected right femoral permacath, removed 10/29/2020. Repeat blood cultures from 10/29/2020 no growth to date. With history of AICD and endocarditis he is at high risk for CIED infection. Tip from R femoral HD cath NTD. Repeat Bcx NTD. CT L spine showed irregular lucency around L1-2 endplate. However no paraspinal fluid collection or clear paraspinal inflammation is identified. TTE reveals EF 25-30%m no vegetation noted. - Continue Vancomycin  - ID following      ESRD on HD at [facility]  Hyperkalemia, s/p IV calcium/insulin/HD. K 5.4 today, will give kayexalate. HD tomorrow  Anemia of CKD  Hx of kidney transplant  Back pain, new onset while inpt  Using L AVF for HD    -Underwent HD today  -Epo per nephro, cont' phoslo       HTN  CAD s/p AICE  con't home meds: ASA/BB     Diarrhea, resolved  No diarrhea for 24 hrs.       H/o left leg DVT and s/p IVC and history of HIT  On warfarin, appreciate pharmacy dosing warfarin. Daily INR     Code status: Full  Prophylaxis: Coumadin  Recommended Disposition: Home w/Family         Subjective:     Chief Complaint / Reason for Physician Visit  \"I feel alright\". Discussed with RN events overnight. Review of Systems:  No fevers, chills, appetite change, cough, sputum production, shortness of breath, dyspnea on exertion, nausea, vomitting, diarrhea, constipation, chest pain, leg edema, abdominal pain, joint pain, rash, itching. Tolerating diet.        Objective:     VITALS:   Last 24hrs VS reviewed since prior progress note. Most recent are:  Patient Vitals for the past 24 hrs:   Temp Pulse Resp BP SpO2   11/03/20 1231  97      11/03/20 1212 98.4 °F (36.9 °C) 87 18 (!) 147/94 95 %   11/03/20 1125 98 °F (36.7 °C) 72 16 136/78    11/03/20 1115  75 16 (!) 144/82    11/03/20 1100  75 16 (!) 147/87    11/03/20 1045  76 16 (!) 144/86    11/03/20 1030  66 16 (!) 158/82    11/03/20 1015  64 16 (!) 152/72    11/03/20 1000  74 16 (!) 142/90    11/03/20 0945  70 16 130/78    11/03/20 0930  77 16 126/66    11/03/20 0915  71 16 (!) 155/87    11/03/20 0900  80 16 (!) 141/84    11/03/20 0845  78 16 (!) 145/83    11/03/20 0830  87 16 (!) 156/92    11/03/20 0815  81 16 (!) 147/86    11/03/20 0800  81 16 (!) 144/83    11/03/20 0753 97.8 °F (36.6 °C) 83 15 (!) 144/90    11/03/20 0636 98.9 °F (37.2 °C) 86 14 109/68 99 %   11/03/20 0325 98 °F (36.7 °C) 91 16 125/88 98 %   11/02/20 2316 97.9 °F (36.6 °C) 94 16 (!) 118/56 99 %   11/02/20 1842 98.3 °F (36.8 °C) 97 18 119/77 100 %   11/02/20 1546 98.7 °F (37.1 °C) 86 16 121/71 94 %       Intake/Output Summary (Last 24 hours) at 11/3/2020 1442  Last data filed at 11/3/2020 1125  Gross per 24 hour   Intake 360 ml   Output 2500 ml   Net -2140 ml        PHYSICAL EXAM:  General: Alert, cooperative, no acute distress    EENT:  EOMI. Anicteric sclerae. MMM  Resp:  CTA bilaterally, no wheezing or rales. No accessory muscle use  CV:  Regular  rhythm,  normal S1/S2, no murmurs rubs gallops, No edema  GI:  Soft, Non distended, Non tender. +Bowel sounds  Neurologic:  Alert and oriented X 3, normal speech,   Psych:   Good insight. Not anxious nor agitated  Skin:  No rashes. No jaundice.  Left UE AVG    Reviewed most current lab test results and cultures  YES  Reviewed most current radiology test results   YES  Review and summation of old records today    NO  Reviewed patient's current orders and MAR    YES  PMH/SH reviewed - no change compared to H&P  ________________________________________________________________________  Care Plan discussed with:    Comments   Patient x    Family      RN x    Care Manager x    Consultant                       x Multidiciplinary team rounds were held today with , nursing, pharmacist and clinical coordinator. Patient's plan of care was discussed; medications were reviewed and discharge planning was addressed. ________________________________________________________________________  Total NON critical care TIME:35   Minutes    Total CRITICAL CARE TIME Spent:   Minutes non procedure based      Comments   >50% of visit spent in counseling and coordination of care x    ________________________________________________________________________  Tasha Mantilla MD     Procedures: see electronic medical records for all procedures/Xrays and details which were not copied into this note but were reviewed prior to creation of Plan. LABS:  I reviewed today's most current labs and imaging studies.   Pertinent labs include:  Recent Labs     11/03/20 0340 11/02/20 0038   WBC 8.7 9.9   HGB 9.2* 8.0*   HCT 28.4* 24.1*    271     Recent Labs     11/03/20  0800 11/03/20 0340 11/02/20 0038 11/01/20  0425   *  --  130*  --    K 5.2*  --  5.4*  --    CL 96*  --  95*  --    CO2 30  --  27  --    GLU 80  --  81  --    BUN 36*  --  27*  --    CREA 11.40*  --  8.76*  --    CA 8.6  --  8.6  --    PHOS 6.1*  --   --   --    ALB 2.8*  --   --   --    INR  --  1.6* 1.6* 1.6*       Signed: Tasha Mantilla MD

## 2020-11-03 NOTE — PROGRESS NOTES
Bedside shift change report given to Hola Pope (oncoming nurse) by Briana Crow (offgoing nurse). Report included the following information SBAR, Kardex and MAR.

## 2020-11-03 NOTE — PROGRESS NOTES
Pharmacy Daily Dosing of Warfarin    Indication: H/o DVT  Goal INR: 2-3    PTA Warfarin Dose: 2.5 mg PO daily    Concurrent anticoagulants: None  Concurrent antiplatelet: Aspirin 81 mg daily    Major Interacting Medications   Drugs that may increase INR: None  Drugs that may decrease INR: None    Conditions that may increase/decrease INR (CHF, C. diff, cirrhosis, thyroid disorder, hypoalbuminemia): None    Labs:  Recent Labs     11/03/20  0800 11/03/20  0340 11/02/20  0038  11/01/20  0425   INR  --  1.6* 1.6*  --  1.6*   HGB  --  9.2* 8.0*   < >  --    PLT  --  309 271  --   --    ALB 2.8*  --   --   --   --     < > = values in this interval not displayed. Impression/Plan:   Will order warfarin 5 mg PO x 1 dose. Daily INR  CBC w/o diff every other day      Pharmacy will follow daily and adjust the dose as appropriate. Thanks  Carrington Serrano PHARMD      [email or web address]/Warfarin%[email or web address]. pdf

## 2020-11-03 NOTE — PROGRESS NOTES
TRANSFER - IN REPORT:    Verbal report received from Porter Stern RN on Rocio Gama  being received from [facility] for ordered procedure      Report consisted of patients Situation, Background, Assessment and   Recommendations(SBAR). Information from the following report(s) SBAR, Kardex and Recent Results was reviewed with the receiving nurse. Opportunity for questions and clarification was provided. Assessment completed upon patients arrival to unit and care assumed.

## 2020-11-03 NOTE — PROGRESS NOTES
Problem: Falls - Risk of  Goal: *Absence of Falls  Description: Document Wesley Valencia Fall Risk and appropriate interventions in the flowsheet.   Outcome: Progressing Towards Goal  Note: Fall Risk Interventions:            Medication Interventions: Evaluate medications/consider consulting pharmacy, Patient to call before getting OOB         History of Falls Interventions: Door open when patient unattended, Evaluate medications/consider consulting pharmacy, Room close to nurse's station

## 2020-11-03 NOTE — PROGRESS NOTES
0700  Bedside report received from [address]. SBAR, Kardex, and MAR were discussed. 1360 Ram[address] Worthington END OF SHIFT REPORT      Bedside shift change report GIVEN TO Jelly Hampton RN. Report included the following information SBAR, Kardex, ED Summary, Intake/Output, MAR, Recent Results and Cardiac Rhythm NSR. R femoral dressing is c/d/i    SIGNIFICANT CHANGES DURING SHIFT:  0950 Patient had 5 beats Vtach while at HD. Patient was asymptomatic. Attending notified. CONCERNS TO ADDRESS WITH MD:  None        [facility] NURSING NOTE   Admission Date 10/29/2020   Admission Diagnosis Hyperkalemia [E87.5]  ESRD (end stage renal disease) ([facility] [address] [N18.6]  CAD (coronary artery disease) [I25.10]   Consults IP CONSULT TO NEPHROLOGY  IP CONSULT TO INFECTIOUS DISEASES      Cardiac Monitoring [x] Yes [] No      Purposeful Hourly Rounding [x] Yes    Easton Score Total Score: 1   Easton score 3 or > [] Bed Alarm [] Avasys [] 1:1 sitter [] Patient refused (Signed refusal form in chart)   Yong Score Yong Score: 20   Yong score 14 or < [] PMT consult [] Wound Care consult    []  Specialty bed  [] Nutrition consult      Influenza Vaccine             Oxygen needs? [x] Room air Oxygen @  []1L    []2L    []3L   []4L    []5L   []6L via  NC   Chronic home O2 use? [] Yes [x] No  Perform O2 challenge test and document in progress note using smartphrase (.Homeoxygen)      Last bowel movement Last Bowel Movement Date: 11/02/20      Urinary Catheter             LDAs               Peripheral IV 54/06/86 Right Basilic (Active)   Site Assessment Clean, dry, & intact 11/03/20 1631   Phlebitis Assessment 0 11/03/20 1631   Infiltration Assessment 0 11/03/20 1631   Dressing Status Clean, dry, & intact 11/03/20 1631   Dressing Type Transparent 11/03/20 1631   Hub Color/Line Status Green;Flushed; Infusing 11/03/20 1631   Action Taken Open ports on tubing capped 10/29/20 1824   Alcohol Cap Used Yes 10/29/20 1824                         Readmission Risk Assessment Tool Score High Risk            21       Total Score        3 Patient Length of Stay (>5 days = 3)    4 IP Visits Last 12 Months (1-3=4, 4=9, >4=11)    9 Pt. Coverage (Medicare=5 , Medicaid, or Self-Pay=4)    5 Charlson Comorbidity Score (Age + Comorbid Conditions)        Criteria that do not apply:    Has Seen PCP in Last 6 Months (Yes=3, No=0)    . Living with Significant Other. Assisted Living. LTAC. SNF.  or   Rehab       Expected Length of Stay 4d 19h   Actual Length of Stay 5

## 2020-11-03 NOTE — PROGRESS NOTES
Problem: Chronic Renal Failure  Goal: *Fluid and electrolytes stabilized  Outcome: Progressing Towards Goal     Problem: Patient Education: Go to Patient Education Activity  Goal: Patient/Family Education  Outcome: Progressing Towards Goal     Problem: Falls - Risk of  Goal: *Absence of Falls  Description: Document Bella Yo Fall Risk and appropriate interventions in the flowsheet.   Outcome: Progressing Towards Goal  Note: Fall Risk Interventions:            Medication Interventions: Bed/chair exit alarm, Patient to call before getting OOB         History of Falls Interventions: Bed/chair exit alarm, Door open when patient unattended, Room close to nurse's station         Problem: Patient Education: Go to Patient Education Activity  Goal: Patient/Family Education  Outcome: Progressing Towards Goal

## 2020-11-03 NOTE — PROGRESS NOTES
Pharmacy Automatic Renal Dosing Protocol - Antimicrobials    Indication for Antimicrobials: dialysis port infection     Current Regimen of Each Antimicrobial:  Vancomycin 500 mg IV after HD (Start Date 10/29; Day # 6)    Previous Antimicrobial Therapy:  None    Goal Level: VANCOMYCIN TROUGH GOAL RANGE  Vancomycin Trough: 20 - 25 mcg/mL  (AUC: 400 - 600 mg/hr/Liter/day)     Date Dose & Interval Measured (mcg/mL) Extrapolated (mcg/mL)   11/3  mg with HD                   Date & time of next level: random level prior to Saturday ()    Significant Cultures:   10/29 Blood: NGTD x 4 days - Final  10/29 Wound: NGTD x 2 days- final    Radiology / Imaging results: (X-ray, CT scan or MRI): none    Paralysis, amputations, malnutrition: none    Labs:  Recent Labs     20  0800 20  0340 20  0038   CREA 11.40*  --  8.76*   BUN 36*  --  27*   WBC  --  8.7 9.9     Temp (24hrs), Av.4 °F (36.9 °C), Min:97.8 °F (36.6 °C), Max:99 °F (37.2 °C)      Is the Patient on Dialysis? Yes: HD days are //    Creatinine Clearance (mL/min): HD    Impression/Plan:   Vancomycin level elevated at 26 mcg/mL but will continue current regimen to allow for the patient to have more distribution into the tissue. Antimicrobial stop date to be determined     Pharmacy will follow daily and adjust medications as appropriate for renal function and/or serum levels. Thank you,  CHIQUITA DuarteD    Recommended duration of therapy  [email or web address]/Pharmacy/Clinical%20Companion/[email or web address]%20ABX%[email or web address]. docx    Renal Dosing  [email or web address]/Pharmacy/Clinical%20Companion/Renal%[id number]. pdf

## 2020-11-03 NOTE — PROCEDURES
[facility] Dialysis Team [facility]  [phone]    Vitals   Pre   Post   Assessment   Pre   Post     Temp  Temp: 97.8 °F (36.6 °C) (11/03/20 0753)  98.0, oral LOC  A&Ox4 A&Ox4   HR   Pulse (Heart Rate): 83 (11/03/20 0753) 72 Lungs   clear clear   B/P   BP: (!) 144/90 (11/03/20 0753) 136/78 Cardiac   Tele, NSR Tele, NSR   Resp   Resp Rate: 15 (11/03/20 0753) 16 Skin   CDI CDI   Pain level  Pain Intensity 1: 5 (11/03/20 0815); Primary RN medicated 0, no complaints at this time Edema  none     none   Orders:    Duration:   Start:    753 End:    1125 Total:   3.5hr   Dialyzer:   Dialyzer/Set Up Inspection: Shantel Swain (11/03/20 0753)   Florance Ledger Bath:   Dialysate K (mEq/L): 2 (11/03/20 0753)   Ca Bath:   Dialysate CA (mEq/L): 2.5 (11/03/20 0753)   Na/Bicarb:   Dialysate NA (mEq/L): 140 (11/03/20 0753)   Target Fluid Removal:   Goal/Amount of Fluid to Remove (mL): 2500 mL (11/03/20 0753)   Access     Type & Location:   SUZANNE AVG: skin CDI. No s/s of infection. + B/T. No issues with cannulation or hemostasis. Running well at Rx .     Labs     Obtained/Reviewed   Critical Results Called   Date when labs were drawn-  Hgb-    HGB   Date Value Ref Range Status   11/03/2020 9.2 (L) 12.1 - 17.0 g/dL Final     K-    Potassium   Date Value Ref Range Status   11/02/2020 5.4 (H) 3.5 - 5.1 mmol/L Final     Ca-   Calcium   Date Value Ref Range Status   11/02/2020 8.6 8.5 - 10.1 MG/DL Final     Bun-   BUN   Date Value Ref Range Status   11/02/2020 27 (H) 6 - 20 MG/DL Final     Creat-   Creatinine   Date Value Ref Range Status   11/02/2020 8.76 (H) 0.70 - 1.30 MG/DL Final        Medications/ Blood Products Given     Name   Dose   Route and Time     None ordered                Blood Volume Processed (BVP):    76.8L Net Fluid   Removed:  2500ml   Comments   Time Out Done: 3869  Primary Nurse Rpt Pre: Tanmay Gaines RN  Primary Nurse Rpt Post: Kacy Pineda RN  Pt Education: how to cannulate his access (home hemo patient)  Care Plan: ongoing  Tx Summary:  Pt arrived to HD suite A&Ox4. Consent signed & on file. SBAR received from Primary RN.  4740: Pt cannulated with 65V needles per policy & without issue. Labs drawn per request/ order. VSS. Dialysis Tx initiated. 0800: Called Primary RN for pain meds. VSS.  0830: Primary RN here with pain meds. VSS.  0930: Dr Manuela Langley at bedside for assessment. Pt request pull 2.5kg instead of 3kg. Order changed. *No issues with treatment*  1125: Tx ended. VSS. All possible blood returned to patient. Hemostasis achieved without issue. Bed locked and in the lowest position, call bell and belongings in reach. SBAR given to Primary, RN. Patient is stable at time of their departure. All Dialysis related medications have been reviewed. Admiting Diagnosis: Hyperkalemia  Pt's previous clinic- home hemo ([address])  Consent signed - Informed Consent Verified: Yes (11/03/20 0823)  Winter Consent - on file  Hepatitis Status- 2/11/2020 Immune; >1000Ab, Neg Ag, clinic  Machine #- Machine Number: [id number] (11/03/20 0753)  Telemetry status- Tele, NSR  Pre-dialysis wt. - Pre-Dialysis Weight: 72.3 kg (159 lb 6.3 oz) (10/29/20 1830)

## 2020-11-03 NOTE — PROGRESS NOTES
HD TRANSFER - OUT REPORT:    Verbal report given to Bliase Willingham RN on Brenna Caraballo being transferred to [facility] for routine progression of care       Report consisted of patient's Situation, Background, Assessment and   Recommendations(SBAR). Information from the following report(s) SBAR, Procedure Summary, Intake/Output and Recent Results was reviewed with the receiving nurse. Method:  $$ Method: Hemodialysis (11/03/20 0753)    Fluid Removed  NET Fluid Removed (mL): 2500 ml (11/03/20 1125)     Patient response to treatment:  Stable    End Time  Hemodialysis End Time: 0795 (11/03/20 1125)  If not documented, dialysis nurse to update post-dialysis row in HD/Filtration flowsheet     Medications /Volume expansion agents or Fluid boluses administered during treatment? no    Post-dialysis medication administration due?  yes  Remind nurse to administer post-HD medication upon return to unit. Fistula hemostasis? yes    Line heparinization? no    Lines: SUZANNE AVG    Opportunity for questions and clarification was provided.       Patient transported with: [employer]

## 2020-11-03 NOTE — PROGRESS NOTES
Nephrology Progress Note  Blaise Leblanc     [email or web address]. [email or web address]  Phone - [phone]   Patient: Dakota Burns    YOB: 1975     Admit Date: 10/29/2020   Date- 11/3/2020     CC: Follow up for esrd          IMPRESSION & PLAN:   · esrd - home hd- F/B [facility] nephrologist  · Hyperkalemia  · Sepsis - staph epi. S/p femoral hd cath removal  · Anemia of ckd  · Sec. hyperpara  · Hx of renal tx inpast.  · Hypertension  · CAD  · H/o DVT, s/p ivc filter/ h/o HIT  ·     PLAN-   Seen on hd today   Remove 2.5 kg   k should improve with dialysis   Continue hd tts   Continue phoslol   Continue epogen   Continue calcitriol     Subjective: Interval History:   -  Bp stable. k 5.2, seen on hd  No c/o sob,  No c/o chest pain,   No c/o nausea or vomiting, No c/o  fever. ROS:- As documented above  Objective:   Vitals:    11/03/20 0800 11/03/20 0815 11/03/20 0830 11/03/20 0845   BP: (!) 144/83 (!) 147/86 (!) 156/92 (!) 145/83   Pulse: 81 81 87 78   Resp: 16 16 16 16   Temp:       TempSrc:       SpO2:       Weight:       Height:          11/02 0701 - 11/03 0700  In: 840 [P.O.:840]  Out: -   Last 3 Recorded Weights in this Encounter    11/01/20 0359 11/02/20 0238 11/03/20 0325   Weight: 69.4 kg (153 lb) 68.3 kg (150 lb 9.2 oz) 71.3 kg (157 lb 3.2 oz)      Physical exam:   GEN: nad  NECK- Supple, no mass  RESP: No wheezing, clear b/l  CVS: S1,S2  RRR  NEURO: Normal speech, non focal  Abdo- soft, NT  EXT: left arm avg +  PSYCH: Normal Mood    Chart reviewed. Pertinent Notes reviewed. Data Review :  Recent Labs     11/03/20  0800 11/02/20  0038   * 130*   K 5.2* 5.4*   CL 96* 95*   CO2 30 27   BUN 36* 27*   CREA 11.40* 8.76*   GLU 80 81   CA 8.6 8.6   PHOS 6.1*  --      Recent Labs     11/03/20  0340 11/02/20  0038   WBC 8.7 9.9   HGB 9.2* 8.0*   HCT 28.4* 24.1*    271     No results for input(s): FE, TIBC, PSAT, FERR in the last 72 hours.    Medication list reviewed  Current Facility-Administered Medications   Medication Dose Route Frequency    Vancomycin Level- Please draw with AM labs 11/3/20  1 Each Other ONCE    WARFARIN INFORMATION NOTE (COUMADIN)   Other QPM    calcium acetate(phosphat bind) (PHOSLO) capsule 1,334 mg  2 Cap Oral TID WITH MEALS    epoetin emilie-epbx (RETACRIT) injection 10,000 Units  10,000 Units SubCUTAneous Q TUE, THU & SAT    acetaminophen (TYLENOL) tablet 500 mg  500 mg Oral Q4H PRN    aspirin chewable tablet 81 mg  81 mg Oral DAILY    calcitRIOL (ROCALTROL) capsule 0.5 mcg  0.5 mcg Oral DAILY    metoprolol succinate (TOPROL-XL) XL tablet 12.5 mg  12.5 mg Oral DAILY    pantoprazole (PROTONIX) tablet 40 mg  40 mg Oral ACB    ondansetron (ZOFRAN) injection 4 mg  4 mg IntraVENous Q6H PRN    morphine injection 1-2 mg  1-2 mg IntraVENous Q4H PRN    Vancomycin - Pharmacy to Dose   Other Rx Dosing/Monitoring    traMADoL (ULTRAM) tablet 50 mg  50 mg Oral Q6H PRN    HYDROcodone-acetaminophen (NORCO)  mg tablet 1 Tab  1 Tab Oral Q6H PRN          Jerica Leal MD              [facility]  [facility] / [facility], [address]  [place], [address]  Phone - [phone]               Fax - [phone]

## 2020-11-03 NOTE — PROGRESS NOTES
Problem: Chronic Renal Failure  Goal: *Fluid and electrolytes stabilized  Outcome: Progressing Towards Goal

## 2020-11-03 NOTE — PROGRESS NOTES
0700  Received bedside report from [facility]. SBAR, Kardex, and MAR were discussed. 1360 Ramona Worthington END OF SHIFT REPORT      Bedside shift change report GIVEN TO MANUELITO Moreland. Report included the following information SBAR, Kardex, ED Summary, Intake/Output, MAR, Recent Results and Cardiac Rhythm NSR.       SIGNIFICANT CHANGES DURING SHIFT:  None      CONCERNS TO ADDRESS WITH MD:  None        [facility] NURSING NOTE   Admission Date 10/29/2020   Admission Diagnosis Hyperkalemia [E87.5]  ESRD (end stage renal disease) ([facility] [address] [N18.6]  CAD (coronary artery disease) [I25.10]   Consults IP CONSULT TO NEPHROLOGY  IP CONSULT TO INFECTIOUS DISEASES      Cardiac Monitoring [x] Yes [] No      Purposeful Hourly Rounding [x] Yes    Easton Score Total Score: 1   Easton score 3 or > [] Bed Alarm [] Avasys [] 1:1 sitter [] Patient refused (Signed refusal form in chart)   Yong Score Yong Score: 20   Yong score 14 or < [] PMT consult [] Wound Care consult    []  Specialty bed  [] Nutrition consult      Influenza Vaccine             Oxygen needs? [x] Room air Oxygen @  []1L    []2L    []3L   []4L    []5L   []6L via  NC   Chronic home O2 use? [] Yes [x] No  Perform O2 challenge test and document in progress note using smartphrase (.Homeoxygen)      Last bowel movement Last Bowel Movement Date: 11/02/20      Urinary Catheter             LDAs               Peripheral IV 72/33/35 Right Basilic (Active)   Site Assessment Clean, dry, & intact 11/02/20 2008   Phlebitis Assessment 0 11/02/20 2008   Infiltration Assessment 0 11/02/20 2008   Dressing Status Clean, dry, & intact 11/02/20 2008   Dressing Type Transparent 11/02/20 2008   Hub Color/Line Status Green 11/02/20 2008   Action Taken Open ports on tubing capped 10/29/20 1824   Alcohol Cap Used Yes 10/29/20 1824                         Readmission Risk Assessment Tool Score Medium Risk            18       Total Score        4 IP Visits Last 12 Months (1-3=4, 4=9, >4=11)    9 Pt.  Coverage (Medicare=5 , Medicaid, or Self-Pay=4)    5 Charlson Comorbidity Score (Age + Comorbid Conditions)        Criteria that do not apply:    Has Seen PCP in Last 6 Months (Yes=3, No=0)    . Living with Significant Other. Assisted Living. LTAC. SNF.  or   Rehab    Patient Length of Stay (>5 days = 3)       Expected Length of Stay 4d 19h   Actual Length of Stay 4

## 2020-11-04 LAB
ANION GAP SERPL CALC-SCNC: 7 MMOL/L (ref 5–15)
BASOPHILS # BLD: 0.1 K/UL (ref 0–0.1)
BASOPHILS NFR BLD: 2 % (ref 0–1)
BUN SERPL-MCNC: 21 MG/DL (ref 6–20)
BUN/CREAT SERPL: 3 (ref 12–20)
CALCIUM SERPL-MCNC: 8.6 MG/DL (ref 8.5–10.1)
CHLORIDE SERPL-SCNC: 99 MMOL/L (ref 97–108)
CO2 SERPL-SCNC: 30 MMOL/L (ref 21–32)
CREAT SERPL-MCNC: 8.01 MG/DL (ref 0.7–1.3)
DIFFERENTIAL METHOD BLD: ABNORMAL
EOSINOPHIL # BLD: 0.5 K/UL (ref 0–0.4)
EOSINOPHIL NFR BLD: 6 % (ref 0–7)
ERYTHROCYTE [DISTWIDTH] IN BLOOD BY AUTOMATED COUNT: 17.7 % (ref 11.5–14.5)
GLUCOSE SERPL-MCNC: 105 MG/DL (ref 65–100)
HCT VFR BLD AUTO: 26 % (ref 36.6–50.3)
HGB BLD-MCNC: 8.4 G/DL (ref 12.1–17)
IMM GRANULOCYTES # BLD AUTO: 0 K/UL (ref 0–0.04)
IMM GRANULOCYTES NFR BLD AUTO: 0 % (ref 0–0.5)
INR PPP: 1.6 (ref 0.9–1.1)
LYMPHOCYTES # BLD: 1.3 K/UL (ref 0.8–3.5)
LYMPHOCYTES NFR BLD: 18 % (ref 12–49)
MAGNESIUM SERPL-MCNC: 2.2 MG/DL (ref 1.6–2.4)
MCH RBC QN AUTO: 32.4 PG (ref 26–34)
MCHC RBC AUTO-ENTMCNC: 32.3 G/DL (ref 30–36.5)
MCV RBC AUTO: 100.4 FL (ref 80–99)
MONOCYTES # BLD: 1.2 K/UL (ref 0–1)
MONOCYTES NFR BLD: 17 % (ref 5–13)
NEUTS SEG # BLD: 4.3 K/UL (ref 1.8–8)
NEUTS SEG NFR BLD: 57 % (ref 32–75)
NRBC # BLD: 0 K/UL (ref 0–0.01)
NRBC BLD-RTO: 0 PER 100 WBC
PHOSPHATE SERPL-MCNC: 4.9 MG/DL (ref 2.6–4.7)
PLATELET # BLD AUTO: 302 K/UL (ref 150–400)
PMV BLD AUTO: 10.3 FL (ref 8.9–12.9)
POTASSIUM SERPL-SCNC: 4.4 MMOL/L (ref 3.5–5.1)
PROTHROMBIN TIME: 16 SEC (ref 9–11.1)
RBC # BLD AUTO: 2.59 M/UL (ref 4.1–5.7)
SODIUM SERPL-SCNC: 136 MMOL/L (ref 136–145)
WBC # BLD AUTO: 7.4 K/UL (ref 4.1–11.1)

## 2020-11-04 PROCEDURE — 84100 ASSAY OF PHOSPHORUS: CPT

## 2020-11-04 PROCEDURE — 65660000001 HC RM ICU INTERMED STEPDOWN

## 2020-11-04 PROCEDURE — 74011250637 HC RX REV CODE- 250/637: Performed by: INTERNAL MEDICINE

## 2020-11-04 PROCEDURE — 85610 PROTHROMBIN TIME: CPT

## 2020-11-04 PROCEDURE — 90935 HEMODIALYSIS ONE EVALUATION: CPT

## 2020-11-04 PROCEDURE — 74011250636 HC RX REV CODE- 250/636: Performed by: INTERNAL MEDICINE

## 2020-11-04 PROCEDURE — 80048 BASIC METABOLIC PNL TOTAL CA: CPT

## 2020-11-04 PROCEDURE — 83735 ASSAY OF MAGNESIUM: CPT

## 2020-11-04 PROCEDURE — 36415 COLL VENOUS BLD VENIPUNCTURE: CPT

## 2020-11-04 PROCEDURE — 85025 COMPLETE CBC W/AUTO DIFF WBC: CPT

## 2020-11-04 RX ORDER — WARFARIN SODIUM 5 MG/1
5 TABLET ORAL ONCE
Status: COMPLETED | OUTPATIENT
Start: 2020-11-04 | End: 2020-11-04

## 2020-11-04 RX ORDER — HYDRALAZINE HYDROCHLORIDE 20 MG/ML
10 INJECTION INTRAMUSCULAR; INTRAVENOUS
Status: DISCONTINUED | OUTPATIENT
Start: 2020-11-04 | End: 2020-11-10 | Stop reason: HOSPADM

## 2020-11-04 RX ADMIN — MORPHINE SULFATE 1 MG: 2 INJECTION, SOLUTION INTRAMUSCULAR; INTRAVENOUS at 11:43

## 2020-11-04 RX ADMIN — ASPIRIN 81 MG CHEWABLE TABLET 81 MG: 81 TABLET CHEWABLE at 08:53

## 2020-11-04 RX ADMIN — PANTOPRAZOLE SODIUM 40 MG: 40 TABLET, DELAYED RELEASE ORAL at 08:53

## 2020-11-04 RX ADMIN — HYDROCODONE BITARTRATE AND ACETAMINOPHEN 1 TABLET: 10; 325 TABLET ORAL at 01:25

## 2020-11-04 RX ADMIN — METOPROLOL SUCCINATE 12.5 MG: 25 TABLET, EXTENDED RELEASE ORAL at 08:53

## 2020-11-04 RX ADMIN — WARFARIN SODIUM 5 MG: 5 TABLET ORAL at 17:02

## 2020-11-04 RX ADMIN — CALCIUM ACETATE 1334 MG: 667 CAPSULE ORAL at 17:02

## 2020-11-04 RX ADMIN — HYDROCODONE BITARTRATE AND ACETAMINOPHEN 1 TABLET: 10; 325 TABLET ORAL at 16:28

## 2020-11-04 RX ADMIN — HYDROCODONE BITARTRATE AND ACETAMINOPHEN 1 TABLET: 10; 325 TABLET ORAL at 08:13

## 2020-11-04 RX ADMIN — CALCIUM ACETATE 1334 MG: 667 CAPSULE ORAL at 12:00

## 2020-11-04 RX ADMIN — HYDROCODONE BITARTRATE AND ACETAMINOPHEN 1 TABLET: 10; 325 TABLET ORAL at 23:30

## 2020-11-04 RX ADMIN — CALCIUM ACETATE 1334 MG: 667 CAPSULE ORAL at 08:52

## 2020-11-04 RX ADMIN — MORPHINE SULFATE 2 MG: 2 INJECTION, SOLUTION INTRAMUSCULAR; INTRAVENOUS at 20:54

## 2020-11-04 RX ADMIN — CALCITRIOL CAPSULES 0.25 MCG 0.5 MCG: 0.25 CAPSULE ORAL at 08:52

## 2020-11-04 NOTE — PROGRESS NOTES
HD TRANSFER - OUT REPORT:    Verbal report given to Sarah Winter on [employer] being transferred to [address] [place] [address] for routine progression of care       Report consisted of patient's Situation, Background, Assessment and   Recommendations(SBAR). Information from the following report(s) SBAR, Procedure Summary, Intake/Output and Recent Results was reviewed with the receiving nurse. Method:  $$ Method: Hemodialysis (11/04/20 1317)    Fluid Removed  NET Fluid Removed (mL): 1000 ml (11/04/20 1550)     Patient response to treatment:  Stable    End Time  Hemodialysis End Time: 1389 (11/04/20 1550)  If not documented, dialysis nurse to update post-dialysis row in HD/Filtration flowsheet     Medications /Volume expansion agents or Fluid boluses administered during treatment? no    Post-dialysis medication administration due?  yes  Remind nurse to administer post-HD medication upon return to unit. Fistula hemostasis? yes    Line heparinization? no    Lines: SUZANNE AVG    Opportunity for questions and clarification was provided.       Patient transported with: [facility] [employer]

## 2020-11-04 NOTE — PROGRESS NOTES
Infectious Disease Progress Note    IMPRESSION:   · MRSE bacteremia secondary to presumed line infection, blood cultures positive  / on 10/20, patient has been on vancomycin IV since 10/24 at HD. · Repeat blood culturesNG 10/29  · Right femoral permacath removed 10/29, catheter tip NG  · End-stage renal disease on HD,s/p failed renal transplant x2  · AICD devices x 2 present, palpable no swelling or erythema or discharge suggestive of infection. · History of MSSA bacteremia secondary to infected femoral line , positive blood cultures 2017  · History of endocarditis per chart notes, patient denies history  · Echo-negative for vegetation, no mention of AICD  · S/p back pain. CT lumbar spine regular loosens L1/2 endplate, and fluid collection. OM/discitis not excluded. Cathye Bozena PLAN:      · Continue Vancomycin IV with HD, target trough 20-25. Pharmacy on consult for dosing. · Cardiology to comment on AICD wires, if poor visualization on TTE, recommend EVER  · Recommend MRI lumbar spine if AICD is MRI compatible, will D/w Dr César Cheng  · D/w Dr Doretha Araya              Subjective:     Patient seen at hemodialysis. Denies complaints. No back pain  AV graft LUE being used for dialysis. Right femoral siteno swelling, discharge  Review of Systems:  A comprehensive review of systems was negative except for that written in the History of Present Illness. 10 point ROS obtained . All other systems negative . Objective:     Blood pressure 129/72, pulse 87, temperature 98.1 °F (36.7 °C), resp. rate 16, height 5' 7\" (1.702 m), weight 149 lb 14.4 oz (68 kg), SpO2 99 %.   Temp (24hrs), Av.4 °F (36.9 °C), Min:98 °F (36.7 °C), Max:99.5 °F (37.5 °C)      Patient Vitals for the past 24 hrs:   Temp Pulse Resp BP SpO2   20 0802 98.1 °F (36.7 °C) 87 16 129/72 99 %   20 0328 98.1 °F (36.7 °C) 88 16 103/60 99 %   20 2320 98.9 °F (37.2 °C) 91 16 116/71 99 %   20 1905 98 °F (36.7 °C) 94 18 130/80 99 %   11/03/20 1631 99.5 °F (37.5 °C) 92 18 131/85 99 %   11/03/20 1231  97      11/03/20 1212 98.4 °F (36.9 °C) 87 18 (!) 147/94 95 %   11/03/20 1125 98 °F (36.7 °C) 72 16 136/78    11/03/20 1115  75 16 (!) 144/82    11/03/20 1100  75 16 (!) 147/87    11/03/20 1045  76 16 (!) 144/86    11/03/20 1030  66 16 (!) 158/82    11/03/20 1015  64 16 (!) 152/72    11/03/20 1000  74 16 (!) 142/90    11/03/20 0945  70 16 130/78    11/03/20 0930  77 16 126/66    11/03/20 0915  71 16 (!) 155/87    11/03/20 0900  80 16 (!) 141/84    11/03/20 0845  78 16 (!) 145/83          Lines:  Peripheral IV:    and HD access    Physical Exam:   General:  Awake, cooperative,   Eyes:  Sclera anicteric. Pupils equally round and reactive to light. Mouth/Throat: Mucous membranes normal, oral pharynx clear   Neck: Supple   Lungs:   reduced auscultation bases   CV:  Regular rate and rhythm,no murmur, click, rub or gallop   Abdomen:   Soft, non-tender. bowel sounds normal. non-distended   Extremities: No  edema   Skin: Skin color, texture, turgor normal. no acute rash or lesions   Lymph nodes: Cervical and supraclavicular normal   Musculoskeletal: No swelling or deformity, r/femoral site- no swelling   Lines/Devices:  Intact, no erythema, drainage or tenderness   Psych: Alert and oriented, normal mood affect       Data Reviewed:    Studies:      Lab Results   Component Value Date/Time    Culture result: NO GROWTH 2 DAYS 10/29/2020 06:13 PM    Culture result: NO GROWTH 5 DAYS 10/29/2020 11:50 AM    Culture result: (A) 10/20/2020 11:19 PM     STAPHYLOCOCCUS EPIDERMIDIS (OXACILLIN RESISTANT) GROWING IN ALL 4 BOTTLES DRAWN (SITE = RAC 1 AND R WIRST)    Culture result: MRSA NOT PRESENT 09/08/2020 06:42 AM    Culture result:  09/08/2020 06:42 AM         Screening of patient nares for MRSA is for surveillance purposes and, if positive, to facilitate isolation considerations in high risk settings.  It is not intended for automatic decolonization interventions per se as regimens are not sufficiently effective to warrant routine use. XR Results (most recent):  Results from Hospital Encounter encounter on 10/20/20   XR CHEST PORT    Narrative INDICATION: SOB    EXAM:  AP CHEST RADIOGRAPH    COMPARISON: September 10, 2020    FINDINGS:    AP portable view of the chest demonstrates mild cardiomegaly with unchanged  pacemakers. There is no edema, effusion, consolidation, or pneumothorax. The  osseous structures are unremarkable. Impression IMPRESSION:  No acute process. Patient Active Problem List   Diagnosis Code    Kidney transplant     Coronary atherosclerosis of native coronary artery I25.10    Nausea & vomiting R11.2    Serum total bilirubin elevated R17    Abdominal pain R10.9    HTN (hypertension) I10    Anemia D64.9    S/P appendectomy Z90.49    High cholesterol E78.00    Other ill-defined conditions(799.89) R69    CAD (coronary artery disease) I25.10    Gastrointestinal disorder K92.9    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[facility]) D69.6    Peritonitis ([facility] [address] K65.9    Malnutrition ([facility] [address] E46    DVT (deep venous thrombosis) ([facility]) I82.409    S/P IVC filter Z95.828    Arterial occlusion I70.90    S/p cadaver renal transplant Z94.0    AICD (automatic cardioverter/defibrillator) present Z95.810    ESRD (end stage renal disease) on dialysis ([facility]) N18.6, Z99.2    Dialysis patient ([facility] [address] Z99.2    Hyperkalemia E87.5    Congestive heart failure, unspecified I50.9    Hypoglycemia E16.2    Sepsis ([facility]) A41.9    Pneumonia J18.9    Abnormal EKG R94.31    ESRD (end stage renal disease) ([facility] [address] N18.6         ICD-10-CM ICD-9-CM    1. Acute hyperkalemia  E87.5 276.7    2. Bacteremia  R78.81 790.7    3. Cystitis  N30.90 595.9    4. AICD (automatic cardioverter/defibrillator) present  Z95.810 V45.02        I have discussed the diagnosis with the patient and the intended plan as seen in the above orders.      I have discussed medication side effects and warnings with the patient as well.     Reviewed test results at length with patient    Anti-infectives:      Vancomycin IV     Mary Mcleod MD [address]

## 2020-11-04 NOTE — PROGRESS NOTES
MARINO: Home with Follow-up Appointments    10:00am- CM met with pt at bedside to discuss d/c plan. CM inquired with pt as to whether he would need HH at d/c. Pt decline HH at this time. Medicare pt has received, reviewed, and signed 2nd  letter informing them of their right to appeal the discharge. Signed copy has been placed on pt bedside chart. CM will continue to follow patient for discharge planning needs and arrange for services as deemed necessary.     Tata Ochoa [address] [place] [address]  [phone]

## 2020-11-04 NOTE — PROGRESS NOTES
TRANSFER - IN REPORT:    Verbal report received from MANUELITO Vargas on Rocky Ahr  being received from [address] for ordered procedure      Report consisted of patients Situation, Background, Assessment and   Recommendations(SBAR). Information from the following report(s) SBAR, Kardex and Cardiac Rhythm NSR was reviewed with the receiving nurse. Opportunity for questions and clarification was provided. Assessment completed upon patients arrival to unit and care assumed.

## 2020-11-04 NOTE — PROGRESS NOTES
0700 Received bedside report from [facility] assumed care of the pt    1227 TRANSFER - OUT REPORT:    Verbal report given to Abi Park RN(name) on Ruddy Yusuf  being transferred to Dialysis (unit) for routine progression of care       Report consisted of patients Situation, Background, Assessment and   Recommendations(SBAR). Information from the following report(s) SBAR, Kardex, Intake/Output and MAR was reviewed with the receiving nurse. Lines:   Peripheral IV 91/00/49 Right Basilic (Active)   Site Assessment Clean, dry, & intact 11/04/20 1122   Phlebitis Assessment 0 11/04/20 1122   Infiltration Assessment 0 11/04/20 1122   Dressing Status Clean, dry, & intact 11/04/20 1122   Dressing Type Tape;Transparent 11/04/20 1122   Hub Color/Line Status Green;Flushed 11/04/20 1122   Action Taken Open ports on tubing capped 11/04/20 1122   Alcohol Cap Used Yes 11/04/20 1122        Opportunity for questions and clarification was provided. 1610 TRANSFER - IN REPORT:    Verbal report received from Abi Park RN(name) on Ruddy Paramjit  being received from Dialysis(unit) for ordered procedure      Report consisted of patients Situation, Background, Assessment and   Recommendations(SBAR). Information from the following report(s) SBAR, Kardex, Intake/Output and MAR was reviewed with the receiving nurse. Opportunity for questions and clarification was provided. Assessment completed upon patients arrival to unit and care assumed. 200 Perfect served Dr. Avni Abdullahi about pts BP being increased x3 cycles. 1640 Paged Dr. Avni Abdullahi concerning pts BP being increased x3 cycles and to inform that pt needs something ordered to address the elevated BP. Waiting for call back       1900 Bedside shift change report given to Weston Schilder, RN (oncoming nurse) by Taco Hutchison RN (offgoing nurse). Report included the following information SBAR, Kardex, Intake/Output and MAR.

## 2020-11-04 NOTE — PROGRESS NOTES
Nephrology Progress Note  Blaise Leblanc     [email or web address]. [email or web address]  Phone - [phone]   Patient: Ledy Hutchinson    YOB: 1975     Admit Date: 10/29/2020   Date- 11/4/2020     CC: Follow up for ESRD          IMPRESSION & PLAN:   · ESRD - home hd- F/B MCV nephrologist- HE HAS BEEN ON MWF HD AT UNIT - as he has been on vanco prior to admission  · Hyperkalemia  · Sepsis - staph epi. S/p femoral hd cath removal  · Anemia of ckd  · Sec. hyperpara  · Hx of renal tx inpast.  · Hypertension  · CAD  · H/o DVT, s/p ivc filter/ h/o HIT  ·     PLAN-   Dialysis today   Change him back to mwf schedule   He can get vanco with hd as out pt   Continue phoslol   Continue epogen   Continue calcitriol     Subjective: Interval History:   -  bp stable  ID and cards input noted. ROS:- As documented above  Objective:   Vitals:    11/04/20 0328 11/04/20 0802 11/04/20 0852 11/04/20 1038   BP: 103/60 129/72 (!) 120/51 134/86   Pulse: 88 87 81 86   Resp: 16 16  16   Temp: 98.1 °F (36.7 °C) 98.1 °F (36.7 °C)  98 °F (36.7 °C)   TempSrc:       SpO2: 99% 99%  99%   Weight:       Height:          11/03 0701 - 11/04 0700  In: 840 [P.O.:840]  Out: 2500   Last 3 Recorded Weights in this Encounter    11/02/20 0238 11/03/20 0325 11/04/20 0123   Weight: 68.3 kg (150 lb 9.2 oz) 71.3 kg (157 lb 3.2 oz) 68 kg (149 lb 14.4 oz)      Physical exam:   GEN: NAD  NECK- Supple, no mass  RESP: No wheezing, clear b/l  CVS: S1,S2  RRR  NEURO: Normal speech, non focal  EXT: left arm avg +  PSYCH: Normal Mood    Chart reviewed. Pertinent Notes reviewed.      Data Review :  Recent Labs     11/04/20  0325 11/03/20  0800 11/02/20  0038    134* 130*   K 4.4 5.2* 5.4*   CL 99 96* 95*   CO2 30 30 27   BUN 21* 36* 27*   CREA 8.01* 11.40* 8.76*   * 80 81   CA 8.6 8.6 8.6   MG 2.2  --   --    PHOS 4.9* 6.1*  --      Recent Labs     11/04/20  0325 11/03/20  0340 11/02/20  0038   WBC 7.4 8.7 9.9   HGB 8.4* 9.2* 8.0*   HCT 26.0* 28.4* 24.1*    309 271     No results for input(s): FE, TIBC, PSAT, FERR in the last 72 hours.    Medication list  reviewed  Current Facility-Administered Medications   Medication Dose Route Frequency    warfarin (COUMADIN) tablet 5 mg  5 mg Oral ONCE    WARFARIN INFORMATION NOTE (COUMADIN)   Other QPM    calcium acetate(phosphat bind) (PHOSLO) capsule 1,334 mg  2 Cap Oral TID WITH MEALS    epoetin emilie-epbx (RETACRIT) injection 10,000 Units  10,000 Units SubCUTAneous Q TUE, THU & SAT    acetaminophen (TYLENOL) tablet 500 mg  500 mg Oral Q4H PRN    aspirin chewable tablet 81 mg  81 mg Oral DAILY    calcitRIOL (ROCALTROL) capsule 0.5 mcg  0.5 mcg Oral DAILY    metoprolol succinate (TOPROL-XL) XL tablet 12.5 mg  12.5 mg Oral DAILY    pantoprazole (PROTONIX) tablet 40 mg  40 mg Oral ACB    ondansetron (ZOFRAN) injection 4 mg  4 mg IntraVENous Q6H PRN    morphine injection 1-2 mg  1-2 mg IntraVENous Q4H PRN    Vancomycin - Pharmacy to Dose   Other Rx Dosing/Monitoring    traMADoL (ULTRAM) tablet 50 mg  50 mg Oral Q6H PRN    HYDROcodone-acetaminophen (NORCO)  mg tablet 1 Tab  1 Tab Oral Q6H PRN          eMl Hilton MD              [facility]  [facility] / [facility], [address], [address]  Phone - [phone]               Fax - [phone]

## 2020-11-04 NOTE — PROGRESS NOTES
Problem: Falls - Risk of  Goal: *Absence of Falls  Description: Document Jayjay Going Fall Risk and appropriate interventions in the flowsheet.   Outcome: Progressing Towards Goal  Note: Fall Risk Interventions:            Medication Interventions: Evaluate medications/consider consulting pharmacy, Patient to call before getting OOB, Teach patient to arise slowly         History of Falls Interventions: Door open when patient unattended, Room close to nurse's station

## 2020-11-04 NOTE — PROGRESS NOTES
The patient has a [employer] Assura single chamber ICD model 1357-40C, a gen change from 6/10/2016. The single ventricular shock lead is a Durata [id number] implanted with the original device 6/11/2009. I will fill out an MRI form and place on the chart as well as scan to the media section.

## 2020-11-04 NOTE — PROGRESS NOTES
Hospitalist Progress Note    NAME: Ruddy Yusuf   :  1975   MRN:  [id number]   Room Number:  2203/01  @ [facility]       Assessment / Plan:  Sepsis POA, resolved due to Staphylococcus epidermidis bacteremia POA   Hx of endocarditis  Positive blood cultures were drawn 12 days ago on 10/20/2020. Started on vancomycin at dialysis center on 10/24. Suspect due to infected right femoral permacath, removed 10/29/2020. Repeat blood cultures from 10/29/2020 no growth to date. With history of AICD and endocarditis he is at high risk for CIED infection. Tip from R femoral HD cath NTD. Repeat Bcx NTD. CT L spine showed irregular lucency around L1-2 endplate. However no paraspinal fluid collection or clear paraspinal inflammation is identified. TTE reveals EF 25-30%m no vegetation noted. - Continue Vancomycin  - ID following, awaiting final recs for Abx. Input is appreciated       ESRD on HD at [facility]  Hyperkalemia, resolved s/p IV calcium/insulin/HD . K 5.4-->4.4 today, s/p kayexalate . Anemia of CKD  Hx of kidney transplant  Back pain, new onset while inpt, resolved witj no issues mobilizing   Using L AVF for HD    -Underwent HD   -Epo per nephro, cont' phoslo  -hbg has been stable       HTN  CAD s/p AICE  con't home meds: ASA/BB      Diarrhea, resolved  No diarrhea for 448 hrs.       H/o left leg DVT and s/p IVC and history of HIT  On warfarin, appreciate pharmacy dosing warfarin. Daily INR. INR 1.6 TODAY     Code status: Full  Prophylaxis: Coumadin  Recommended Disposition: Home w/Family         Subjective:     Chief Complaint / Reason for Physician Visit    Discussed with RN events overnight. Patient was seen and examined. No acute events overnight.     \"doing very good\"    Review of Systems:  No fevers, chills, appetite change, cough, sputum production, shortness of breath, dyspnea on exertion, nausea, vomitting, diarrhea, constipation, chest pain, leg edema, abdominal pain, joint pain, rash, itching. Tolerating diet. Objective:     VITALS:   Last 24hrs VS reviewed since prior progress note. Most recent are:  Patient Vitals for the past 24 hrs:   Temp Pulse Resp BP SpO2   11/04/20 0802 98.1 °F (36.7 °C) 87 16 129/72 99 %   11/04/20 0328 98.1 °F (36.7 °C) 88 16 103/60 99 %   11/03/20 2320 98.9 °F (37.2 °C) 91 16 116/71 99 %   11/03/20 1905 98 °F (36.7 °C) 94 18 130/80 99 %   11/03/20 1631 99.5 °F (37.5 °C) 92 18 131/85 99 %   11/03/20 1231  97      11/03/20 1212 98.4 °F (36.9 °C) 87 18 (!) 147/94 95 %   11/03/20 1125 98 °F (36.7 °C) 72 16 136/78    11/03/20 1115  75 16 (!) 144/82    11/03/20 1100  75 16 (!) 147/87    11/03/20 1045  76 16 (!) 144/86    11/03/20 1030  66 16 (!) 158/82    11/03/20 1015  64 16 (!) 152/72    11/03/20 1000  74 16 (!) 142/90    11/03/20 0945  70 16 130/78    11/03/20 0930  77 16 126/66    11/03/20 0915  71 16 (!) 155/87    11/03/20 0900  80 16 (!) 141/84    11/03/20 0845  78 16 (!) 145/83    11/03/20 0830  87 16 (!) 156/92        Intake/Output Summary (Last 24 hours) at 11/4/2020 0823  Last data filed at 11/4/2020 0615  Gross per 24 hour   Intake 840 ml   Output 2500 ml   Net -1660 ml        PHYSICAL EXAM:  General: Alert, cooperative, no acute distress    EENT:  EOMI. Anicteric sclerae. MMM  Resp:  CTA bilaterally, no wheezing or rales. No accessory muscle use  CV:  Regular  rhythm,  normal S1/S2, no murmurs rubs gallops, No edema  GI:  Soft, Non distended, Non tender. +Bowel sounds  Neurologic:  Alert and oriented X 3, normal speech,   Psych:   Good insight. Not anxious nor agitated  Skin:  No rashes. No jaundice.  Left UE AVG    Reviewed most current lab test results and cultures  YES  Reviewed most current radiology test results   YES  Review and summation of old records today    NO  Reviewed patient's current orders and MAR    YES  PMH/SH reviewed - no change compared to H&P  ________________________________________________________________________  Care Plan discussed with:    Comments   Patient x    Family      RN x    Care Manager x    Consultant                       x Multidiciplinary team rounds were held today with , nursing, pharmacist and clinical coordinator. Patient's plan of care was discussed; medications were reviewed and discharge planning was addressed. ________________________________________________________________________        Comments   >50% of visit spent in counseling and coordination of care x    ________________________________________________________________________  Nathan Fish MD     Procedures: see electronic medical records for all procedures/Xrays and details which were not copied into this note but were reviewed prior to creation of Plan. LABS:  I reviewed today's most current labs and imaging studies.   Pertinent labs include:  Recent Labs     11/04/20 0325 11/03/20  0340 11/02/20  0038   WBC 7.4 8.7 9.9   HGB 8.4* 9.2* 8.0*   HCT 26.0* 28.4* 24.1*    309 271     Recent Labs     11/04/20  0325 11/03/20  0800 11/03/20  0340 11/02/20  0038    134*  --  130*   K 4.4 5.2*  --  5.4*   CL 99 96*  --  95*   CO2 30 30  --  27   * 80  --  81   BUN 21* 36*  --  27*   CREA 8.01* 11.40*  --  8.76*   CA 8.6 8.6  --  8.6   MG 2.2  --   --   --    PHOS 4.9* 6.1*  --   --    ALB  --  2.8*  --   --    INR 1.6*  --  1.6* 1.6*       Signed: Nathan Fish MD

## 2020-11-04 NOTE — PROGRESS NOTES
1900: Bedside shift change report given to Quinton Mendez RN (oncoming nurse) by Cuba Roy RN (offgoing nurse). Report included the following information SBAR, Kardex, ED Summary, Intake/Output, MAR, Recent Results and Cardiac Rhythm NSR. I have assumed care of this patient.

## 2020-11-04 NOTE — PROGRESS NOTES
1360 Ramona Worthington END OF SHIFT REPORT      Bedside shift change report GIVEN TO Min Gillis RN. Report included the following information SBAR, Kardex, ED Summary, Procedure Summary, Intake/Output, MAR, Recent Results and Cardiac Rhythm NSR.       SIGNIFICANT CHANGES DURING SHIFT:  None      CONCERNS TO ADDRESS WITH MD:  None        [facility] NURSING NOTE   Admission Date 10/29/2020   Admission Diagnosis Hyperkalemia [E87.5]  ESRD (end stage renal disease) ([facility] [address] [N18.6]  CAD (coronary artery disease) [I25.10]   Consults IP CONSULT TO NEPHROLOGY  IP CONSULT TO INFECTIOUS DISEASES      Cardiac Monitoring [x] Yes [] No      Purposeful Hourly Rounding [x] Yes    Easton Score Total Score: 1   Easton score 3 or > [] Bed Alarm [] Avasys [] 1:1 sitter [] Patient refused (Signed refusal form in chart)   Yong Score Yong Score: 20   Yong score 14 or < [] PMT consult [] Wound Care consult    []  Specialty bed  [] Nutrition consult      Influenza Vaccine             Oxygen needs? [x] Room air Oxygen @  []1L    []2L    []3L   []4L    []5L   []6L via  NC   Chronic home O2 use? [] Yes [x] No  Perform O2 challenge test and document in progress note using smartphrase (.Homeoxygen)      Last bowel movement Last Bowel Movement Date: 11/02/20      Urinary Catheter             LDAs               Peripheral IV 28/82/04 Right Basilic (Active)   Site Assessment Clean, dry, & intact 11/04/20 0802   Phlebitis Assessment 0 11/04/20 0802   Infiltration Assessment 0 11/04/20 0802   Dressing Status Clean, dry, & intact 11/04/20 0802   Dressing Type Tape;Transparent 11/04/20 0802   Hub Color/Line Status Green;Flushed 11/04/20 0802   Action Taken Open ports on tubing capped 11/04/20 0802   Alcohol Cap Used Yes 11/04/20 0802                         Readmission Risk Assessment Tool Score High Risk            21       Total Score        3 Patient Length of Stay (>5 days = 3)    4 IP Visits Last 12 Months (1-3=4, 4=9, >4=11)    9 Pt.  Coverage (Medicare=5 , Medicaid, or Self-Pay=4)    5 Charlson Comorbidity Score (Age + Comorbid Conditions)        Criteria that do not apply:    Has Seen PCP in Last 6 Months (Yes=3, No=0)    . Living with Significant Other. Assisted Living. LTAC. SNF.  or   Rehab       Expected Length of Stay 4d 19h   Actual Length of Stay 6

## 2020-11-04 NOTE — PROCEDURES
[facility] Dialysis Team [facility]  [phone]    Vitals   Pre   Post   Assessment   Pre   Post     Temp  Temp: 98.2 °F (36.8 °C) (11/04/20 1317)  97.8, oral LOC  A&Ox4 A&Ox4   HR   Pulse (Heart Rate): 84 (11/04/20 1317) 80 Lungs   clear clear   B/P   BP: 129/78 (11/04/20 1317) 153/99 Cardiac   Tele, NSR Tele, NSR   Resp   Resp Rate: 16 (11/04/20 1317) 16 Skin   CDI CDI   Pain level  Pain Intensity 1: 7 (11/04/20 1144); medicated prior to arrival 0, no c/o pain at this time Edema  none   none   Orders:    Duration:   Start:    7050 End:    1550 Total:   2.5hr   Dialyzer:   Dialyzer/Set Up Inspection: Rosey Babinski (11/04/20 1317)   K Bath:   Dialysate K (mEq/L): 2 (11/04/20 1317)   Ca Bath:   Dialysate CA (mEq/L): 2.5 (11/04/20 1317)   Na/Bicarb:   Dialysate NA (mEq/L): 140 (11/04/20 1317)   Target Fluid Removal:   Goal/Amount of Fluid to Remove (mL): 1500 mL (11/04/20 1317)   Access     Type & Location:   SUZANNE AVG: skin CDI. No s/s of infection. + B/T. No issues with cannulation or hemostasis. Running well at Rx .     Labs     Obtained/Reviewed   Critical Results Called   Date when labs were drawn-  Hgb-    HGB   Date Value Ref Range Status   11/04/2020 8.4 (L) 12.1 - 17.0 g/dL Final     K-    Potassium   Date Value Ref Range Status   11/04/2020 4.4 3.5 - 5.1 mmol/L Final     Ca-   Calcium   Date Value Ref Range Status   11/04/2020 8.6 8.5 - 10.1 MG/DL Final     Bun-   BUN   Date Value Ref Range Status   11/04/2020 21 (H) 6 - 20 MG/DL Final     Creat-   Creatinine   Date Value Ref Range Status   11/04/2020 8.01 (H) 0.70 - 1.30 MG/DL Final     Comment:     INVESTIGATED PER DELTA CHECK PROTOCOL        Medications/ Blood Products Given     Name   Dose   Route and Time     None ordered                Blood Volume Processed (BVP):    56.0L Net Fluid   Removed:  1000ml   Comments   Time Out Done: 1315  Primary Nurse Rpt Pre: Kelley Wharton RN  Primary Nurse Rpt Post: Kelley Wharton RN  Pt Education: need for HD today to get back on clinic schedule  Care Plan: ongoing  Tx Summary:   Pt arrived to HD suite A&Ox4. Consent signed & on file. SBAR received from Primary RN. 1317: Pt cannulated with 12F needles per policy & without issue. VSS. Dialysis Tx initiated. 1430: Pt c/o feeling dizzy. /70. UF off.  1445: No complaints. UF back on at decreased UFR. 1550: Tx ended. VSS. All possible blood returned to patient. Hemostasis achieved without issue. Bed locked and in the lowest position, call bell and belongings in reach. SBAR given to Primary, RN. Patient is stable at time of their departure. All Dialysis related medications have been reviewed. Admiting Diagnosis: hyperkalemia/ back pain  Pt's previous clinic- home hemo  Consent signed - Informed Consent Verified: Yes (11/04/20 3663)  Winter Consent - on file  Hepatitis Status- Immune 2/11/2020 clinic  Machine #- Machine Number: [id number] (11/04/20 1317)  Telemetry status- Tele, NSR  Pre-dialysis wt. - Pre-Dialysis Weight: 72.3 kg (159 lb 6.3 oz) (10/29/20 2270)

## 2020-11-04 NOTE — PROGRESS NOTES
Pharmacy Daily Dosing of Warfarin    Indication: H/o DVT  Goal INR: 2-3    PTA Warfarin Dose: 2.5 mg PO daily    Concurrent anticoagulants: None  Concurrent antiplatelet: Aspirin 81 mg daily    Major Interacting Medications   Drugs that may increase INR: None  Drugs that may decrease INR: None    Conditions that may increase/decrease INR (CHF, C. diff, cirrhosis, thyroid disorder, hypoalbuminemia): None    Labs:  Recent Labs     11/04/20  0325 11/03/20  0800 11/03/20  0340 11/02/20  0038   INR 1.6*  --  1.6* 1.6*   HGB 8.4*  --  9.2* 8.0*     --  309 271   ALB  --  2.8*  --   --            Impression/Plan:   Will order warfarin 5 mg PO x 1 dose. Daily INR  CBC w/o diff every other day      Pharmacy will follow daily and adjust the dose as appropriate. Thanks  Carlos A Tapia PHARMD      [email or web address]/Warfarin%[email or web address]. pdf

## 2020-11-05 LAB
ERYTHROCYTE [DISTWIDTH] IN BLOOD BY AUTOMATED COUNT: 17.5 % (ref 11.5–14.5)
HCT VFR BLD AUTO: 26.5 % (ref 36.6–50.3)
HGB BLD-MCNC: 8.4 G/DL (ref 12.1–17)
INR PPP: 2 (ref 0.9–1.1)
MCH RBC QN AUTO: 32.2 PG (ref 26–34)
MCHC RBC AUTO-ENTMCNC: 31.7 G/DL (ref 30–36.5)
MCV RBC AUTO: 101.5 FL (ref 80–99)
NRBC # BLD: 0 K/UL (ref 0–0.01)
NRBC BLD-RTO: 0 PER 100 WBC
PLATELET # BLD AUTO: 282 K/UL (ref 150–400)
PMV BLD AUTO: 10.3 FL (ref 8.9–12.9)
PROTHROMBIN TIME: 20.1 SEC (ref 9–11.1)
RBC # BLD AUTO: 2.61 M/UL (ref 4.1–5.7)
VANCOMYCIN SERPL-MCNC: 20 UG/ML
WBC # BLD AUTO: 8.4 K/UL (ref 4.1–11.1)

## 2020-11-05 PROCEDURE — 74011250637 HC RX REV CODE- 250/637: Performed by: INTERNAL MEDICINE

## 2020-11-05 PROCEDURE — 36415 COLL VENOUS BLD VENIPUNCTURE: CPT

## 2020-11-05 PROCEDURE — 85027 COMPLETE CBC AUTOMATED: CPT

## 2020-11-05 PROCEDURE — 65660000001 HC RM ICU INTERMED STEPDOWN

## 2020-11-05 PROCEDURE — 99232 SBSQ HOSP IP/OBS MODERATE 35: CPT | Performed by: INTERNAL MEDICINE

## 2020-11-05 PROCEDURE — 80202 ASSAY OF VANCOMYCIN: CPT

## 2020-11-05 PROCEDURE — 74011250636 HC RX REV CODE- 250/636: Performed by: INTERNAL MEDICINE

## 2020-11-05 PROCEDURE — 85610 PROTHROMBIN TIME: CPT

## 2020-11-05 RX ORDER — WARFARIN 2 MG/1
2 TABLET ORAL ONCE
Status: COMPLETED | OUTPATIENT
Start: 2020-11-05 | End: 2020-11-05

## 2020-11-05 RX ADMIN — EPOETIN ALFA-EPBX 10000 UNITS: 10000 INJECTION, SOLUTION INTRAVENOUS; SUBCUTANEOUS at 21:55

## 2020-11-05 RX ADMIN — CALCITRIOL CAPSULES 0.25 MCG 0.5 MCG: 0.25 CAPSULE ORAL at 08:45

## 2020-11-05 RX ADMIN — CALCIUM ACETATE 1334 MG: 667 CAPSULE ORAL at 12:42

## 2020-11-05 RX ADMIN — CALCIUM ACETATE 1334 MG: 667 CAPSULE ORAL at 16:38

## 2020-11-05 RX ADMIN — HYDROCODONE BITARTRATE AND ACETAMINOPHEN 1 TABLET: 10; 325 TABLET ORAL at 22:33

## 2020-11-05 RX ADMIN — METOPROLOL SUCCINATE 12.5 MG: 25 TABLET, EXTENDED RELEASE ORAL at 08:45

## 2020-11-05 RX ADMIN — HYDROCODONE BITARTRATE AND ACETAMINOPHEN 1 TABLET: 10; 325 TABLET ORAL at 06:18

## 2020-11-05 RX ADMIN — HYDROCODONE BITARTRATE AND ACETAMINOPHEN 1 TABLET: 10; 325 TABLET ORAL at 16:38

## 2020-11-05 RX ADMIN — ASPIRIN 81 MG CHEWABLE TABLET 81 MG: 81 TABLET CHEWABLE at 08:45

## 2020-11-05 RX ADMIN — PANTOPRAZOLE SODIUM 40 MG: 40 TABLET, DELAYED RELEASE ORAL at 08:45

## 2020-11-05 RX ADMIN — WARFARIN SODIUM 2 MG: 2 TABLET ORAL at 17:33

## 2020-11-05 NOTE — PROGRESS NOTES
Pharmacy Automatic Renal Dosing Protocol - Antimicrobials    Indication for Antimicrobials: dialysis port infection     Current Regimen of Each Antimicrobial:  Vancomycin 500 mg IV after HD (Start Date 10/29; Day # 7)    Previous Antimicrobial Therapy:  None    Goal Level: VANCOMYCIN TROUGH GOAL RANGE  Vancomycin Trough: 20 - 25 mcg/mL  (AUC: 400 - 600 mg/hr/Liter/day)     Date Dose & Interval Measured (mcg/mL) Extrapolated (mcg/mL)   11/3  mg with HD 26     AM Random Level after HD             Date & time of next level: random level prior to Saturday ()    Significant Cultures:   10/20 Blood MRSE 4/4- final  10/29 Blood: NGTD x 4 days - Final  10/29 Wound: NGTD x 2 days- final    Radiology / Imaging results: (X-ray, CT scan or MRI): none    Paralysis, amputations, malnutrition: none    Labs:  Recent Labs     20  0358 20  0325 20  0800 20  0340   CREA  --  8.01* 11.40*  --    BUN  --  21* 36*  --    WBC 8.4 7.4  --  8.7     Temp (24hrs), Av.2 °F (36.8 °C), Min:97.8 °F (36.6 °C), Max:98.7 °F (37.1 °C)      Is the Patient on Dialysis? Yes: HD days are //    Creatinine Clearance (mL/min): HD    Impression/Plan:   Vancomycin random/trough level  20; within goal.  Will continue current regimen with no further re-dose today. Plan to re-dose 500 mg with next dialysis tomorrow  Antimicrobial stop date to be determined. Pharmacy will follow daily and adjust medications as appropriate for renal function and/or serum levels.     Thank you,  Jessica Larry, PHARMD

## 2020-11-05 NOTE — PROGRESS NOTES
Hospitalist Progress Note    NAME: Gem Álvarez   :  1975   MRN:  [id number]   Room Number:  2203/01  @ [facility]       Assessment / Plan:  Sepsis POA, resolved due to Staphylococcus epidermidis bacteremia POA   Hx of endocarditis  Positive blood cultures were drawn 12 days ago on 10/20/2020. Started on vancomycin at dialysis center on 10/24. Suspect due to infected right femoral permacath, removed 10/29/2020. Repeat blood cultures from 10/29/2020 no growth to date. With history of AICD and endocarditis he is at high risk for CIED infection. Tip from R femoral HD cath NTD. Repeat Bcx NTD. CT L spine showed irregular lucency around L1-2 endplate. However no paraspinal fluid collection or clear paraspinal inflammation is identified. TTE reveals EF 25-30%m no vegetation noted. - Continue Vancomycin  - ID following, recommending MRI lumbar spine with AICD is MRI compatible. Cardiology input is appreciated, to comment on AICD wires. Possible EVER      ESRD on HD at [facility]  Hyperkalemia, resolved s/p IV calcium/insulin/HD . K 5.4-->4.4 today, s/p kayexalate . Anemia of CKD  Hx of kidney transplant  Back pain, new onset while inpt, resolved witj no issues mobilizing   Using L AVF for HD    -Underwent HD   -Epo per nephro, cont' phoslo  -hbg has been stable       HTN  CAD s/p AICE  con't home meds: ASA/BB      Diarrhea, resolved  No diarrhea for 448 hrs.       H/o left leg DVT and s/p IVC and history of HIT  On warfarin, appreciate pharmacy dosing warfarin. Daily INR. INR 2 TODAY. Pharmacy input is appreciated     Code status: Full  Prophylaxis: Coumadin  Recommended Disposition: Home w/Family         Subjective:     Chief Complaint / Reason for Physician Visit    Discussed with RN events overnight. Patient was seen and examined. No acute events overnight.     \"Nothing much\"    Review of Systems:  No fevers, chills, appetite change, cough, sputum production, shortness of breath, dyspnea on exertion, nausea, vomitting, diarrhea, constipation, chest pain, leg edema, abdominal pain, joint pain, rash, itching. Tolerating diet. Objective:     VITALS:   Last 24hrs VS reviewed since prior progress note. Most recent are:  Patient Vitals for the past 24 hrs:   Temp Pulse Resp BP SpO2   11/05/20 1058 97.9 °F (36.6 °C) 79 18 109/72 99 %   11/05/20 0845  97  (!) 146/95    11/05/20 0639 98.6 °F (37 °C) 97 16 131/83 99 %   11/05/20 0310 98.7 °F (37.1 °C) 84 18 115/71 100 %   11/04/20 2207 98.1 °F (36.7 °C) 94 20 106/74 97 %   11/04/20 1826 98 °F (36.7 °C) 99 18 120/77 97 %   11/04/20 1550 97.8 °F (36.6 °C) 80 16 (!) 153/99    11/04/20 1545  77 16 (!) 144/98    11/04/20 1530  73 16 (!) 135/94    11/04/20 1515  74 16 (!) 131/93    11/04/20 1500  76 16 (!) 138/93    11/04/20 1445  77 16 127/87    11/04/20 1430  66 16 110/70    11/04/20 1415  79 16 113/78    11/04/20 1400  76 16 113/72    11/04/20 1345  75 16 121/77    11/04/20 1330  80 16 131/86    11/04/20 1317 98.2 °F (36.8 °C) 84 16 129/78        Intake/Output Summary (Last 24 hours) at 11/5/2020 1218  Last data filed at 11/4/2020 1550  Gross per 24 hour   Intake    Output 1000 ml   Net -1000 ml        PHYSICAL EXAM:  General: Alert, cooperative, no acute distress    EENT:  EOMI. Anicteric sclerae. MMM  Resp:  CTA bilaterally, no wheezing or rales. No accessory muscle use  CV:  Regular  rhythm,  normal S1/S2, no murmurs rubs gallops, No edema  GI:  Soft, Non distended, Non tender. +Bowel sounds  Neurologic:  Alert and oriented X 3, normal speech,   Psych:   Good insight. Not anxious nor agitated  Skin:  No rashes. No jaundice.  Left UE AVG    Reviewed most current lab test results and cultures  YES  Reviewed most current radiology test results   YES  Review and summation of old records today    NO  Reviewed patient's current orders and MAR    YES  PMH/SH reviewed - no change compared to H&P  ________________________________________________________________________  Care Plan discussed with:    Comments   Patient x    Family      RN x    Care Manager x    Consultant                       x Multidiciplinary team rounds were held today with , nursing, pharmacist and clinical coordinator. Patient's plan of care was discussed; medications were reviewed and discharge planning was addressed. ________________________________________________________________________        Comments   >50% of visit spent in counseling and coordination of care x    ________________________________________________________________________  Arthur Hanson MD     Procedures: see electronic medical records for all procedures/Xrays and details which were not copied into this note but were reviewed prior to creation of Plan. LABS:  I reviewed today's most current labs and imaging studies.   Pertinent labs include:  Recent Labs     11/05/20 0358 11/04/20 0325 11/03/20  0340   WBC 8.4 7.4 8.7   HGB 8.4* 8.4* 9.2*   HCT 26.5* 26.0* 28.4*    302 309     Recent Labs     11/05/20 0358 11/04/20 0325 11/03/20  0800 11/03/20  0340   NA  --  136 134*  --    K  --  4.4 5.2*  --    CL  --  99 96*  --    CO2  --  30 30  --    GLU  --  105* 80  --    BUN  --  21* 36*  --    CREA  --  8.01* 11.40*  --    CA  --  8.6 8.6  --    MG  --  2.2  --   --    PHOS  --  4.9* 6.1*  --    ALB  --   --  2.8*  --    INR 2.0* 1.6*  --  1.6*       Signed: Arthur Hanson MD

## 2020-11-05 NOTE — PROGRESS NOTES
0700 Received bedside shift report from Juany Wilson RN assumed care of the pt.    1900 Bedside shift change report given to Juany Wilson RN (oncoming nurse) by Robyn Najjar, RN (offgoing nurse). Report included the following information SBAR, Kardex, Intake/Output and MAR.

## 2020-11-05 NOTE — PROGRESS NOTES
Nephrology Progress Note  Blaise Leblanc     [email or web address]  Phone - [phone]   Patient: Maribel Crum    YOB: 1975     Admit Date: 10/29/2020   Date- 11/5/2020     CC: Follow up for esrd          IMPRESSION & PLAN:   · esrd - home hd- F/B [facility] nephrologist- HE HAS BEEN ON MWF HD AT UNIT - as he has been on vanco prior to admission  · Hyperkalemia  · Sepsis - staph epi. S/p femoral hd cath removal  · Anemia of ckd  · Sec. hyperpara  · Hx of renal tx inpast.  · Hypertension  · CAD  · H/o DVT, s/p ivc filter/ h/o HIT  ·     PLAN-   No hd today   Continue hd mwf schedule   He can get vanco with hd as out pt   Continue phoslol   Continue epogen   Continue calcitriol     Subjective: Interval History:   - bp stable- s/p hd yesterday  No c/o sob,  No c/o chest pain,   No c/o nausea or vomiting, No c/o  fever. ROS:- As documented above  Objective:   Vitals:    11/05/20 0310 11/05/20 0615 11/05/20 0639 11/05/20 0845   BP: 115/71  131/83 (!) 146/95   Pulse: 84  97 97   Resp: 18  16    Temp: 98.7 °F (37.1 °C)  98.6 °F (37 °C)    TempSrc:       SpO2: 100%  99%    Weight:  68.3 kg (150 lb 9.2 oz)     Height:          11/04 0701 - 11/05 0700  In: -   Out: 1000   Last 3 Recorded Weights in this Encounter    11/03/20 0325 11/04/20 0123 11/05/20 0615   Weight: 71.3 kg (157 lb 3.2 oz) 68 kg (149 lb 14.4 oz) 68.3 kg (150 lb 9.2 oz)      Physical exam:   GEN: NAD  NECK- Supple, no mass  RESP: No wheezing, clear b/l  CVS: S1,S2  RRR  NEURO: Normal speech, non focal  EXT: left arm avg +  PSYCH: Normal Mood    Chart reviewed. Pertinent Notes reviewed.      Data Review :  Recent Labs     11/04/20  0325 11/03/20  0800    134*   K 4.4 5.2*   CL 99 96*   CO2 30 30   BUN 21* 36*   CREA 8.01* 11.40*   * 80   CA 8.6 8.6   MG 2.2  --    PHOS 4.9* 6.1*     Recent Labs     11/05/20  0358 11/04/20  0325 11/03/20  0340   WBC 8.4 7.4 8.7   HGB 8.4* 8.4* 9.2*   HCT 26.5* 26.0* 28.4*    302 309     No results for input(s): FE, TIBC, PSAT, FERR in the last 72 hours.    Medication list  reviewed  Current Facility-Administered Medications   Medication Dose Route Frequency    warfarin (COUMADIN) tablet 2 mg  2 mg Oral ONCE    hydrALAZINE (APRESOLINE) 20 mg/mL injection 10 mg  10 mg IntraVENous Q6H PRN    WARFARIN INFORMATION NOTE (COUMADIN)   Other QPM    calcium acetate(phosphat bind) (PHOSLO) capsule 1,334 mg  2 Cap Oral TID WITH MEALS    epoetin emilie-epbx (RETACRIT) injection 10,000 Units  10,000 Units SubCUTAneous Q TUE, THU & SAT    acetaminophen (TYLENOL) tablet 500 mg  500 mg Oral Q4H PRN    aspirin chewable tablet 81 mg  81 mg Oral DAILY    calcitRIOL (ROCALTROL) capsule 0.5 mcg  0.5 mcg Oral DAILY    metoprolol succinate (TOPROL-XL) XL tablet 12.5 mg  12.5 mg Oral DAILY    pantoprazole (PROTONIX) tablet 40 mg  40 mg Oral ACB    ondansetron (ZOFRAN) injection 4 mg  4 mg IntraVENous Q6H PRN    morphine injection 1-2 mg  1-2 mg IntraVENous Q4H PRN    Vancomycin - Pharmacy to Dose   Other Rx Dosing/Monitoring    traMADoL (ULTRAM) tablet 50 mg  50 mg Oral Q6H PRN    HYDROcodone-acetaminophen (NORCO)  mg tablet 1 Tab  1 Tab Oral Q6H PRN          Lyoda Erickson MD              [facility]  [facility], [address] [place], [address]  Phone - [phone]               Fax - [phone]

## 2020-11-05 NOTE — PROGRESS NOTES
Pharmacy Daily Dosing of Warfarin    Indication: H/o DVT (s/p IVC)  Goal INR: 2-3    PTA Warfarin Dose: 2.5 mg PO daily    Concurrent anticoagulants: None  Concurrent antiplatelet: Aspirin 81 mg daily    Major Interacting Medications   Drugs that may increase INR: None  Drugs that may decrease INR: None    Conditions that may increase/decrease INR (CHF, C. diff, cirrhosis, thyroid disorder, hypoalbuminemia): None    Labs:  Recent Labs     11/05/20  0358 11/04/20  0325 11/03/20  0800 11/03/20  0340   INR 2.0* 1.6*  --  1.6*   HGB 8.4* 8.4*  --  9.2*    302  --  309   ALB  --   --  2.8*  --            Impression/Plan:   INR increased from 1.6 to 2. Will re-dose today at 2 mg  Watch to see if EVER will be done; no current plans yet  Daily INR  CBC w/o diff every other day      Pharmacy will follow daily and adjust the dose as appropriate.     Thanks  Antonio Ornelas, PHARMD

## 2020-11-05 NOTE — PROGRESS NOTES
Comprehensive Nutrition Assessment    Type and Reason for Visit: Initial, RD nutrition re-screen/LOS    Nutrition Recommendations/Plan:   Continue renal diet    Nutrition Assessment:      11/5: Chart reviewed for LOS. Pt noted for ESRD on HD, sepsis, and anemia of CKD. Hx includes kidney transplant, HTN, CAD, DVT, GERD, and bowel resection d/t necrotic bowel in 2011. Renal diet ordered. Pt reports good appetite today and PTA, though he has been ordering food from outside the the hospital. Pt reports that he does not cook and most meals are obtained through [employer] dash. He somewhat follows renal diet recommendations but admits he often just eats what he wants. He reports drinking up to 6 regular 12-oz sodas per day including root beer, Sprite and Coke. RD discussed better alternatives and reducing sugary beverage intake. Pt reports his desired dry wt is 67.5kg, though he usually runs 68-70kg d/t high fluid intake between dialysis sessions. CBW 68.3kg. Pt has a shellfish allergy and reports mild, occasional constipation. He takes miralax at home prn. Nutrition will continue following. Estimated Daily Nutrient Needs:  Energy (kcal): 1998 kcal (BMR 1537 x 1. 3AF); Weight Used for Energy Requirements: Current  Protein (g): 82g (1.2g/kg); Weight Used for Protein Requirements: Current  Fluid (ml/day): 1500mL; Method Used for Fluid Requirements: Standard renal      Nutrition Related Findings:  Labs: Phos 4.9 (trending down), Hgb 8.9, CRP elevated. Meds: calcitriol, phoslo, retacrit, protonix, coumadin. BM 11/5. Wounds:    None       Current Nutrition Therapies:  DIET RENAL Regular    Anthropometric Measures:  · Height:  5' 7\" (170.2 cm)  · Current Body Wt:  68.3 kg (150 lb 9.2 oz)   · BMI Category:  Normal weight (BMI 18.5-24. 9)       Nutrition Diagnosis:   · Limited adherence to nutrition-related recommendations related to renal dysfunction as evidenced by (elevated phos, most meals are ordered out)      Nutrition Interventions:   Food and/or Nutrient Delivery: Continue current diet  Nutrition Education and Counseling: No recommendations at this time  Coordination of Nutrition Care: Continue to monitor while inpatient    Goals:  Continue PO intake >50% meals following renal diet next 5-7 days       Nutrition Monitoring and Evaluation:   Behavioral-Environmental Outcomes:    Food/Nutrient Intake Outcomes: Food and nutrient intake  Physical Signs/Symptoms Outcomes: Biochemical data, Weight, Hemodynamic status    Discharge Planning:    Continue current diet     Electronically signed by Sumanth Goetz RD on 11/5/2020 at 4:09 PM    Contact: [facility]  Pager [phone]

## 2020-11-06 LAB
ALBUMIN SERPL-MCNC: 2.8 G/DL (ref 3.5–5)
ANION GAP SERPL CALC-SCNC: 8 MMOL/L (ref 5–15)
BASOPHILS # BLD: 0.1 K/UL (ref 0–0.1)
BASOPHILS NFR BLD: 1 % (ref 0–1)
BUN SERPL-MCNC: 27 MG/DL (ref 6–20)
BUN/CREAT SERPL: 3 (ref 12–20)
CALCIUM SERPL-MCNC: 9.1 MG/DL (ref 8.5–10.1)
CHLORIDE SERPL-SCNC: 100 MMOL/L (ref 97–108)
CO2 SERPL-SCNC: 28 MMOL/L (ref 21–32)
CREAT SERPL-MCNC: 9.24 MG/DL (ref 0.7–1.3)
DIFFERENTIAL METHOD BLD: ABNORMAL
EOSINOPHIL # BLD: 0.5 K/UL (ref 0–0.4)
EOSINOPHIL NFR BLD: 6 % (ref 0–7)
ERYTHROCYTE [DISTWIDTH] IN BLOOD BY AUTOMATED COUNT: 17.4 % (ref 11.5–14.5)
GLUCOSE SERPL-MCNC: 96 MG/DL (ref 65–100)
HCT VFR BLD AUTO: 26 % (ref 36.6–50.3)
HGB BLD-MCNC: 8.2 G/DL (ref 12.1–17)
IMM GRANULOCYTES # BLD AUTO: 0 K/UL (ref 0–0.04)
IMM GRANULOCYTES NFR BLD AUTO: 0 % (ref 0–0.5)
INR PPP: 2.2 (ref 0.9–1.1)
LYMPHOCYTES # BLD: 1.2 K/UL (ref 0.8–3.5)
LYMPHOCYTES NFR BLD: 14 % (ref 12–49)
MCH RBC QN AUTO: 32.3 PG (ref 26–34)
MCHC RBC AUTO-ENTMCNC: 31.5 G/DL (ref 30–36.5)
MCV RBC AUTO: 102.4 FL (ref 80–99)
MONOCYTES # BLD: 1.2 K/UL (ref 0–1)
MONOCYTES NFR BLD: 14 % (ref 5–13)
NEUTS SEG # BLD: 5.5 K/UL (ref 1.8–8)
NEUTS SEG NFR BLD: 65 % (ref 32–75)
NRBC # BLD: 0 K/UL (ref 0–0.01)
NRBC BLD-RTO: 0 PER 100 WBC
PHOSPHATE SERPL-MCNC: 4.7 MG/DL (ref 2.6–4.7)
PLATELET # BLD AUTO: 250 K/UL (ref 150–400)
PMV BLD AUTO: 10.2 FL (ref 8.9–12.9)
POTASSIUM SERPL-SCNC: 4.8 MMOL/L (ref 3.5–5.1)
PROTHROMBIN TIME: 21.9 SEC (ref 9–11.1)
RBC # BLD AUTO: 2.54 M/UL (ref 4.1–5.7)
SODIUM SERPL-SCNC: 136 MMOL/L (ref 136–145)
WBC # BLD AUTO: 8.4 K/UL (ref 4.1–11.1)

## 2020-11-06 PROCEDURE — 74011250636 HC RX REV CODE- 250/636: Performed by: HOSPITALIST

## 2020-11-06 PROCEDURE — 80069 RENAL FUNCTION PANEL: CPT

## 2020-11-06 PROCEDURE — 36415 COLL VENOUS BLD VENIPUNCTURE: CPT

## 2020-11-06 PROCEDURE — 65660000001 HC RM ICU INTERMED STEPDOWN

## 2020-11-06 PROCEDURE — 85025 COMPLETE CBC W/AUTO DIFF WBC: CPT

## 2020-11-06 PROCEDURE — 85610 PROTHROMBIN TIME: CPT

## 2020-11-06 PROCEDURE — 74011000258 HC RX REV CODE- 258: Performed by: HOSPITALIST

## 2020-11-06 PROCEDURE — 90935 HEMODIALYSIS ONE EVALUATION: CPT

## 2020-11-06 PROCEDURE — 74011250637 HC RX REV CODE- 250/637: Performed by: INTERNAL MEDICINE

## 2020-11-06 PROCEDURE — 74011250637 HC RX REV CODE- 250/637: Performed by: HOSPITALIST

## 2020-11-06 PROCEDURE — 99232 SBSQ HOSP IP/OBS MODERATE 35: CPT | Performed by: INTERNAL MEDICINE

## 2020-11-06 RX ORDER — WARFARIN 2.5 MG/1
2.5 TABLET ORAL ONCE
Status: COMPLETED | OUTPATIENT
Start: 2020-11-06 | End: 2020-11-06

## 2020-11-06 RX ADMIN — CALCIUM ACETATE 1334 MG: 667 CAPSULE ORAL at 12:13

## 2020-11-06 RX ADMIN — HYDROCODONE BITARTRATE AND ACETAMINOPHEN 1 TABLET: 10; 325 TABLET ORAL at 19:33

## 2020-11-06 RX ADMIN — HYDROCODONE BITARTRATE AND ACETAMINOPHEN 1 TABLET: 10; 325 TABLET ORAL at 12:16

## 2020-11-06 RX ADMIN — WARFARIN SODIUM 2.5 MG: 2.5 TABLET ORAL at 17:03

## 2020-11-06 RX ADMIN — CALCIUM ACETATE 1334 MG: 667 CAPSULE ORAL at 16:47

## 2020-11-06 RX ADMIN — VANCOMYCIN HYDROCHLORIDE 500 MG: 500 INJECTION, POWDER, LYOPHILIZED, FOR SOLUTION INTRAVENOUS at 16:45

## 2020-11-06 RX ADMIN — CALCITRIOL CAPSULES 0.25 MCG 0.5 MCG: 0.25 CAPSULE ORAL at 09:00

## 2020-11-06 RX ADMIN — HYDROCODONE BITARTRATE AND ACETAMINOPHEN 1 TABLET: 10; 325 TABLET ORAL at 04:41

## 2020-11-06 RX ADMIN — ASPIRIN 81 MG CHEWABLE TABLET 81 MG: 81 TABLET CHEWABLE at 09:00

## 2020-11-06 RX ADMIN — METOPROLOL SUCCINATE 12.5 MG: 25 TABLET, EXTENDED RELEASE ORAL at 12:12

## 2020-11-06 NOTE — PROGRESS NOTES
Infectious Disease Progress Note    IMPRESSION:   · MRSE bacteremia secondary to presumed line infection  · Blood cultures positive  / on 10/20, patient has been on vancomycin IV since 10/24 at HD. · Repeat blood culturesNG 10/29  · Right femoral permacath removed 10/29, catheter tip culture NG  · End-stage renal disease on HD,s/p failed renal transplant x2  · AICD devices x 2 present, palpable no swelling or erythema or discharge suggestive of infection. · History of MSSA bacteremia secondary to infected femoral line , positive blood cultures 2017  · History of endocarditis per chart notes, patient denies history  · Echo-negative for vegetation, no mention of AICD  · S/p back pain. CT lumbar spine irregular lucency L1/2 endplate, and fluid collection. OM/discitis not excluded. For MRI   . PLAN:      · Continue Vancomycin IV with HD, target trough 20-25. Pharmacy on consult for dosing. · Cardiology to comment on AICD wires, if poor visualization on TTE, recommend EVER  · MRI lumbar spine evaluate for OM/ discitis  · D/w Dr Catherine De Jesus            Subjective:     Patient seen . Denies complaints. Back pain is intermittent  AV graft LUE +. Right femoral siteno swelling, discharge  Review of Systems:  A comprehensive review of systems was negative except for that written in the History of Present Illness. 10 point ROS obtained . All other systems negative . Objective:     Blood pressure 131/86, pulse 94, temperature 98 °F (36.7 °C), resp. rate 18, height 5' 7\" (1.702 m), weight 150 lb 9.2 oz (68.3 kg), SpO2 100 %.   Temp (24hrs), Av.1 °F (36.7 °C), Min:97.6 °F (36.4 °C), Max:98.7 °F (37.1 °C)      Patient Vitals for the past 24 hrs:   Temp Pulse Resp BP SpO2   20 2200 98 °F (36.7 °C) 94 18 131/86 100 %   20 1848 97.6 °F (36.4 °C) 96 20 101/60 99 %   20 1504 97.8 °F (36.6 °C) 96 18 (!) 118/101 100 %   20 1058 97.9 °F (36.6 °C) 79 18 109/72 99 %   20 0845  97  Kimmie Juan ) 146/95    11/05/20 0639 98.6 °F (37 °C) 97 16 131/83 99 %   11/05/20 0310 98.7 °F (37.1 °C) 84 18 115/71 100 %   11/04/20 2207 98.1 °F (36.7 °C) 94 20 106/74 97 %         Lines:  Peripheral IV: AVG    Physical Exam:   General:  Awake, cooperative,   Eyes:  Sclera anicteric. Pupils equally round and reactive to light. Mouth/Throat: Mucous membranes normal, oral pharynx clear   Neck: Supple   Lungs:   reduced auscultation bases   CV:  Regular rate and rhythm,no murmur, click, rub or gallop   Abdomen:   Soft, non-tender. bowel sounds normal. non-distended   Extremities: No  edema   Skin: Skin color, texture, turgor normal. no acute rash or lesions   Lymph nodes: Cervical and supraclavicular normal   Musculoskeletal: No swelling or deformity, r/femoral site- no swelling   Lines/Devices:  Intact, no erythema, drainage or tenderness   Psych: Alert and oriented, normal mood affect       Data Reviewed:    Studies:      Lab Results   Component Value Date/Time    Culture result: NO GROWTH 2 DAYS 10/29/2020 06:13 PM    Culture result: NO GROWTH 5 DAYS 10/29/2020 11:50 AM    Culture result: (A) 10/20/2020 11:19 PM     STAPHYLOCOCCUS EPIDERMIDIS (OXACILLIN RESISTANT) GROWING IN ALL 4 BOTTLES DRAWN (SITE = RAC 1 AND R WIRST)    Culture result: MRSA NOT PRESENT 09/08/2020 06:42 AM    Culture result:  09/08/2020 06:42 AM         Screening of patient nares for MRSA is for surveillance purposes and, if positive, to facilitate isolation considerations in high risk settings. It is not intended for automatic decolonization interventions per se as regimens are not sufficiently effective to warrant routine use. XR Results (most recent):  Results from Hospital Encounter encounter on 10/20/20   XR CHEST PORT    Narrative INDICATION: SOB    EXAM:  AP CHEST RADIOGRAPH    COMPARISON: September 10, 2020    FINDINGS:    AP portable view of the chest demonstrates mild cardiomegaly with unchanged  pacemakers.  There is no edema, effusion, consolidation, or pneumothorax. The  osseous structures are unremarkable. Impression IMPRESSION:  No acute process. Patient Active Problem List   Diagnosis Code    Kidney transplant     Coronary atherosclerosis of native coronary artery I25.10    Nausea & vomiting R11.2    Serum total bilirubin elevated R17    Abdominal pain R10.9    HTN (hypertension) I10    Anemia D64.9    S/P appendectomy Z90.49    High cholesterol E78.00    Other ill-defined conditions(799.89) R69    CAD (coronary artery disease) I25.10    Gastrointestinal disorder K92.9    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[facility]) D69.6    Peritonitis ([facility] K65.9    Malnutrition ([facility] E46    DVT (deep venous thrombosis) ([facility]) I82.409    S/P IVC filter Z95.828    Arterial occlusion I70.90    S/p cadaver renal transplant Z94.0    AICD (automatic cardioverter/defibrillator) present Z95.810    ESRD (end stage renal disease) on dialysis ([facility]) N18.6, Z99.2    Dialysis patient ([facility] Z99.2    Hyperkalemia E87.5    Congestive heart failure, unspecified I50.9    Hypoglycemia E16.2    Sepsis ([facility]) A41.9    Pneumonia J18.9    Abnormal EKG R94.31    ESRD (end stage renal disease) ([facility] N18.6         ICD-10-CM ICD-9-CM    1. Acute hyperkalemia  E87.5 276.7    2. Bacteremia  R78.81 790.7    3. Cystitis  N30.90 595.9    4. AICD (automatic cardioverter/defibrillator) present  Z95.810 V45.02    5. Bacteremia due to methicillin resistant Staphylococcus epidermidis  R78.81 790.7     B95.7 041.19     Z16.29     6. Line sepsis associated with dialysis catheter ([facility]  T82. 7XXA 996.62     A41.9 038.9    7. ESRD (end stage renal disease) ([facility]  N18.6 585.6    8. Essential hypertension  I10 401.9    9. Kidney transplant   V42.0    10. Staphylococcus aureus bacteremia  R78.81 790.7     B95.61 041.11        I have discussed the diagnosis with the patient and the intended plan as seen in the above orders.      I have discussed medication side effects and warnings with the patient as well.     Reviewed test results at length with patient    Anti-infectives:      Vancomycin IV     Rain Wise antonio

## 2020-11-06 NOTE — PROCEDURES
Winter Dialysis Team [facility]  [phone]    Vitals   Pre   Post   Assessment   Pre   Post     Temp  Temp: 98.2 °F (36.8 °C) (11/06/20 0747)  98.7 LOC  A&Ox4 A&Ox4   HR   Pulse (Heart Rate): 94 (11/06/20 0747) 90 Lungs   CLEAR  CLEAR   B/P   BP: (!) 152/71 (11/06/20 0747) 136/71 Cardiac   RRR RRR   Resp   Resp Rate: 18 (11/06/20 0747) 18 Skin   CDI CDI   Pain level  Pain Intensity 1: 0 (11/06/20 0754) 0 Edema  TRACE     TRACE   Orders:    Duration:   Start:    6605 End:    1125 Total:   3.5 hours   Dialyzer:   Dialyzer/Set Up Inspection: Sanjuanita Session (11/06/20 0747)   Ada Boas Bath:   Dialysate K (mEq/L): 2 (11/06/20 0747)   Ca Bath:   Dialysate CA (mEq/L): 2.5 (11/06/20 0747)   Na/Bicarb:   Dialysate NA (mEq/L): 140 (11/06/20 0747)   Target Fluid Removal:   Goal/Amount of Fluid to Remove (mL): 2500 mL (11/06/20 0747)   Access     Type & Location:   SUZANNE AVG: skin CDI. No s/s of infection. + B/T. No issues with cannulation or hemostasis. Running well at . Pt arrived to HD suite A&Ox4. Consent signed & on file. SBAR received from Primary RN. Pt cannulated with 65S needles per policy & without issue. VSS. Dialysis Tx initiated. Labs     Obtained/Reviewed   Critical Results Called   Date when labs were drawn-  Hgb-    HGB   Date Value Ref Range Status   11/06/2020 8.2 (L) 12.1 - 17.0 g/dL Final     K-    Potassium   Date Value Ref Range Status   11/06/2020 4.8 3.5 - 5.1 mmol/L Final     Ca-   Calcium   Date Value Ref Range Status   11/06/2020 9.1 8.5 - 10.1 MG/DL Final     Bun-   BUN   Date Value Ref Range Status   11/06/2020 27 (H) 6 - 20 MG/DL Final     Creat-   Creatinine   Date Value Ref Range Status   11/06/2020 9.24 (H) 0.70 - 1.30 MG/DL Final        Medications/ Blood Products Given     Name   Dose   Route and Time     None ordered                Blood Volume Processed (BVP):    78.3 Net Fluid   Removed:  2500mL   Comments   All dialysis related medications have been reviewed.   Assessment performed by RN. Procedure and documentation observed and reviewed by José Luis Zarate RN  Time Out Done:  7960  Primary Nurse Rpt Pre:  Jonathan Iglesias RN  Primary Nurse Rpt Post:  Prashanth Hillman RN  Pt Education:  procedural  Care Plan:  On going  Tx Summary:  2748:  SUZANNE AVG: skin CDI. No s/s of infection. + B/T. No issues with cannulation or hemostasis. Running well at . Pt arrived to HD suite A&Ox4. Consent signed & on file. SBAR received from Primary RN. Pt cannulated with 73C needles per policy & without issue. VSS. Dialysis Tx initiated. 0800:  Pt resting  0815:  Pt resting  0830:  Pt resting  0845:  Pt resting  0900:  Pt resting  0915:  Pt resting  0930:  Pt resting  0945:  Pt resting  1000:  Pt resting  1015:  Pt resting  1030:  Pt resting  1045:  Pt resting  1100:  Pt resting  1115:  Pt resting  1125: Tx ended. VSS. All possible blood returned to patient. Hemostasis achieved without issue. Bed locked and in the lowest position, call bell and belongings in reach. SBAR given to Primary, RN. Patient is stable at time of their/ my departure. Admiting Diagnosis:  Hyperkalemia  Pt's previous clinic-  [facility]  Consent signed - Informed Consent Verified: Yes (11/06/20 1486)  Winter Consent - signed and on file  Hepatitis Status- neg/imm 2/11/20  Machine #- Machine Number: [id number] (11/06/20 5753)  Telemetry status-  Pre-dialysis wt. - Pre-Dialysis Weight: 72.3 kg (159 lb 6.3 oz) (10/29/20 3760)

## 2020-11-06 NOTE — PROGRESS NOTES
0700: Bedside shift change report given to Mony Owusu RN (oncoming nurse) by Farnaz Delvalle RN (offgoing nurse). Report included the following information SBAR and Kardex. 0740: Pt going MISSAEL to HD.     1145: Received report from KENNY Penn RN pt coming back from HD with transport.

## 2020-11-06 NOTE — PROGRESS NOTES
Pharmacy Daily Dosing of Warfarin    Indication: H/o DVT (s/p IVC)  Goal INR: 2-3    PTA Warfarin Dose: 2.5 mg PO daily    Concurrent anticoagulants: None  Concurrent antiplatelet: Aspirin 81 mg daily    Major Interacting Medications   Drugs that may increase INR: None  Drugs that may decrease INR: None    Conditions that may increase/decrease INR (CHF, C. diff, cirrhosis, thyroid disorder, hypoalbuminemia): None    Labs:  Recent Labs     11/06/20  0645 11/06/20  0300 11/05/20  0358 11/04/20  0325   INR  --  2.2* 2.0* 1.6*   HGB 8.2*  --  8.4* 8.4*     --  282 302   ALB 2.8*  --   --   --            Impression/Plan:   Warfarin 2.5 mg PO x 1 dose tonight  Daily INR  CBC w/o diff every other day      Pharmacy will follow daily and adjust the dose as appropriate. Thanks  Rebel Maldonado PHARMD      [email or web address]%[email or web address]. pdf

## 2020-11-06 NOTE — PROGRESS NOTES
Nephrology Progress Note  [place]     [email or web address]  Phone - [phone]   Patient: Terese Severance    YOB: 1975     Admit Date: 10/29/2020   Date- 11/6/2020     CC: Follow up for end-stage renal disease          IMPRESSION & PLAN:   · ESRD- home hd- F/B [facility] nephrologist- HE HAS BEEN ON MWF HD AT UNIT - as he has been on vanco prior to admission  · Hyperkalemia  · Sepsis - staph epi. S/p femoral hd cath removal  · Anemia of ckd  · Sec. hyperpara  · Hx of renal tx inpast.  · Hypertension  · CAD  · H/o DVT, s/p ivc filter/ h/o HIT  ·     PLAN-   Seen on dialysis today   Remove 2 kg   Continue hd mwf schedule   He can get vanco with hd as out pt   Continue phoslol   Continue epogen   Continue calcitriol     Subjective: Interval History:   -Seen on dialysis today bp stable- s/p hd yesterday  No C/O of chest pain,SOB, vomiting, abdominal pain,fever,chills      ROS:- As documented above  Objective:   Vitals:    11/06/20 0830 11/06/20 0845 11/06/20 0900 11/06/20 0915   BP: (!) 144/81 (!) 147/76 (!) 154/83 (!) 151/85   Pulse: 92 92 91 86   Resp: 18 18 18 18   Temp:       TempSrc:       SpO2:       Weight:       Height:          No intake/output data recorded. Last 3 Recorded Weights in this Encounter    11/04/20 0123 11/05/20 0615 11/06/20 0358   Weight: 68 kg (149 lb 14.4 oz) 68.3 kg (150 lb 9.2 oz) 68.7 kg (151 lb 7.3 oz)      Physical exam:   GEN: NAD  NECK- Supple, no mass  RESP: No wheezing, clear bilaterally   NEURO: Normal speech, nonfocal  EXT: left arm avg +  PSYCH: Normal Mood    Chart reviewed. Pertinent Notes reviewed.      Data Review :  Recent Labs     11/06/20  0645 11/04/20  0325    136   K 4.8 4.4    99   CO2 28 30   BUN 27* 21*   CREA 9.24* 8.01*   GLU 96 105*   CA 9.1 8.6   MG  --  2.2   PHOS 4.7 4.9*     Recent Labs     11/06/20  0645 11/05/20  0358 11/04/20  0325   WBC 8.4 8.4 7.4   HGB 8.2* 8.4* 8.4*   HCT 26.0* 26.5* 26.0*    282 302     No results for input(s): FE, TIBC, PSAT, FERR in the last 72 hours.    Medication list  reviewed  Current Facility-Administered Medications   Medication Dose Route Frequency    hydrALAZINE (APRESOLINE) 20 mg/mL injection 10 mg  10 mg IntraVENous Q6H PRN    WARFARIN INFORMATION NOTE (COUMADIN)   Other QPM    calcium acetate(phosphat bind) (PHOSLO) capsule 1,334 mg  2 Cap Oral TID WITH MEALS    epoetin emilie-epbx (RETACRIT) injection 10,000 Units  10,000 Units SubCUTAneous Q TUE, THU & SAT    acetaminophen (TYLENOL) tablet 500 mg  500 mg Oral Q4H PRN    aspirin chewable tablet 81 mg  81 mg Oral DAILY    calcitRIOL (ROCALTROL) capsule 0.5 mcg  0.5 mcg Oral DAILY    metoprolol succinate (TOPROL-XL) XL tablet 12.5 mg  12.5 mg Oral DAILY    pantoprazole (PROTONIX) tablet 40 mg  40 mg Oral ACB    ondansetron (ZOFRAN) injection 4 mg  4 mg IntraVENous Q6H PRN    morphine injection 1-2 mg  1-2 mg IntraVENous Q4H PRN    Vancomycin - Pharmacy to Dose   Other Rx Dosing/Monitoring    traMADoL (ULTRAM) tablet 50 mg  50 mg Oral Q6H PRN    HYDROcodone-acetaminophen (NORCO)  mg tablet 1 Tab  1 Tab Oral Q6H PRN          Rodolfo Emanuel MD              [facility]  [facility], [address]  [place], [address]  Phone - [phone]               Fax - [phone]

## 2020-11-06 NOTE — PROGRESS NOTES
Hospitalist Progress Note    NAME: Mike Saxena   :  1975   MRN:  [id number]   Room Number:  2203/01  @ [facility]       Assessment / Plan:  Sepsis POA, resolved due to MRSE bacteremia POA   Hx of endocarditis  Positive blood cultures were drawn 12 days ago on 10/20/2020. Started on vancomycin at dialysis center on 10/24.    -Suspect due to infected right femoral permacath, removed 10/29/2020.    -Repeat blood cultures from 10/29/2020 no growth to date. With history of AICD and endocarditis he is at high risk for CIED infection. Tip from R femoral HD cath NTD. Repeat Bcx NTD. CT L spine showed irregular lucency around L1-2 endplate. However no paraspinal fluid collection or clear paraspinal inflammation is identified. TTE reveals EF 25-30%m no vegetation noted. - Continue Vancomycin  - ID following, recommending MRI lumbar spine with AICD is MRI compatible. -IV Vanc. Duration-if MRI negative for 2 weeks end date . If MRI positive for OM/discitis patient will need 6 weeks of vancomycin end date 12/10. Cardiology input is appreciated, to comment on AICD wires. ESRD on HD at [facility]  Hyperkalemia, resolved s/p IV calcium/insulin/HD . K 5.4-->4.4 today, s/p kayexalate . Anemia of CKD  Hx of kidney transplant  Back pain, new onset while inpt, resolved witj no issues mobilizing   Using L AVF for HD    -Underwent HD   -Epo per nephro, cont' phoslo  -hbg has been stable       HTN  CAD s/p AICE  con't home meds: ASA/BB      Diarrhea, resolved  No diarrhea for 448 hrs.       H/o left leg DVT and s/p IVC and history of HIT  On warfarin, appreciate pharmacy dosing warfarin. Daily INR. INR 2 TODAY. Pharmacy input is appreciated     Code status: Full  Prophylaxis: Coumadin  Recommended Disposition: Home w/Family         Subjective:     Chief Complaint / Reason for Physician Visit    Discussed with RN events overnight. Patient was seen and examined.   No acute events overnight. \"Nothing much\"    Review of Systems:  No fevers, chills, appetite change, cough, sputum production, shortness of breath, dyspnea on exertion, nausea, vomitting, diarrhea, constipation, chest pain, leg edema, abdominal pain, joint pain, rash, itching. Tolerating diet. Objective:     VITALS:   Last 24hrs VS reviewed since prior progress note. Most recent are:  Patient Vitals for the past 24 hrs:   Temp Pulse Resp BP SpO2   11/06/20 1212 99 °F (37.2 °C) 97 18 (!) 140/84 99 %   11/06/20 1125 98.7 °F (37.1 °C) 90 18 136/71    11/06/20 1115  86 18 122/62    11/06/20 1100  86 18 132/65    11/06/20 1045  99 18 (!) 145/86    11/06/20 1030  87 18 124/67    11/06/20 1015  92 18 129/75    11/06/20 1000  88 18 129/63    11/06/20 0945  86 18 124/70    11/06/20 0930  88 18 (!) 143/80    11/06/20 0915  86 18 (!) 151/85    11/06/20 0900  91 18 (!) 154/83    11/06/20 0845  92 18 (!) 147/76    11/06/20 0830  92 18 (!) 144/81    11/06/20 0815  92 18 (!) 142/86    11/06/20 0800  94 18 (!) 161/84    11/06/20 0747 98.2 °F (36.8 °C) 94 18 (!) 152/71    11/06/20 0706 98.2 °F (36.8 °C) 99 16 135/85 99 %   11/06/20 0203 97.9 °F (36.6 °C) 99 18 134/84 97 %   11/05/20 2337 98.7 °F (37.1 °C) 99 18  98 %   11/05/20 2200 98 °F (36.7 °C) 94 18 131/86 100 %   11/05/20 1848 97.6 °F (36.4 °C) 96 20 101/60 99 %   11/05/20 1504 97.8 °F (36.6 °C) 96 18 (!) 118/101 100 %       Intake/Output Summary (Last 24 hours) at 11/6/2020 1251  Last data filed at 11/6/2020 1125  Gross per 24 hour   Intake    Output 2500 ml   Net -2500 ml        PHYSICAL EXAM:  General: Alert, cooperative, no acute distress    EENT:  EOMI. Anicteric sclerae. MMM  Resp:  CTA bilaterally, no wheezing or rales. No accessory muscle use  CV:  Regular  rhythm,  normal S1/S2, no murmurs rubs gallops, No edema  GI:  Soft, Non distended, Non tender.   +Bowel sounds  Neurologic:  Alert and oriented X 3, normal speech,   Psych: Good insight. Not anxious nor agitated  Skin:  No rashes. No jaundice. Left UE AVG    Reviewed most current lab test results and cultures  YES  Reviewed most current radiology test results   YES  Review and summation of old records today    NO  Reviewed patient's current orders and MAR    YES  PMH/SH reviewed - no change compared to H&P  ________________________________________________________________________  Care Plan discussed with:    Comments   Patient x    Family      RN x    Care Manager x    Consultant                       x Multidiciplinary team rounds were held today with , nursing, pharmacist and clinical coordinator. Patient's plan of care was discussed; medications were reviewed and discharge planning was addressed. ________________________________________________________________________        Comments   >50% of visit spent in counseling and coordination of care x    ________________________________________________________________________  Tyra Coughlin MD     Procedures: see electronic medical records for all procedures/Xrays and details which were not copied into this note but were reviewed prior to creation of Plan. LABS:  I reviewed today's most current labs and imaging studies.   Pertinent labs include:  Recent Labs     11/06/20  0645 11/05/20  0358 11/04/20  0325   WBC 8.4 8.4 7.4   HGB 8.2* 8.4* 8.4*   HCT 26.0* 26.5* 26.0*    282 302     Recent Labs     11/06/20  0645 11/06/20  0300 11/05/20  0358 11/04/20  0325     --   --  136   K 4.8  --   --  4.4     --   --  99   CO2 28  --   --  30   GLU 96  --   --  105*   BUN 27*  --   --  21*   CREA 9.24*  --   --  8.01*   CA 9.1  --   --  8.6   MG  --   --   --  2.2   PHOS 4.7  --   --  4.9*   ALB 2.8*  --   --   --    INR  --  2.2* 2.0* 1.6*       Signed: Tyra Coughlin MD

## 2020-11-06 NOTE — PROGRESS NOTES
1900: Report received from [facility]. Assumed care of pt.     0700: Bedside shift change report given to MANUELITO Suresh (oncoming nurse) by Maria Eugenia John (offgoing nurse). Report included the following information SBAR, Kardex, Procedure Summary, Intake/Output, MAR, Recent Results and Cardiac Rhythm NSR.

## 2020-11-06 NOTE — PROGRESS NOTES
Infectious Disease Progress Note    IMPRESSION:   · MRSE bacteremia secondary to presumed line infection  · Blood cultures positive  / on 10/20, patient has been on vancomycin IV since 10/24 at HD. · Repeat blood culturesNG 10/29  · Right femoral permacath removed 10/29, catheter tip culture NG  · End-stage renal disease on HD,s/p failed renal transplant x2  · AICD devices x 2 present, palpable no swelling or erythema or discharge suggestive of infection. · History of MSSA bacteremia secondary to infected femoral line , positive blood cultures 2017  · History of endocarditis per chart notes, patient denies history  · Echo-negative for vegetation on valve AICD  · S/p back pain. CT lumbar spine irregular lucency L1/2 endplate, and fluid collection. OM/discitis not excluded. For MRI         PLAN:      · Plan for DC on vancomycin 500 mg IV with HD 3 times weekly, target trough 20-25. Pharmacy to adjust dose as needed. Duration-if MRI negative for 2 weeks end date . If MRI positive for OM/discitis patient will need 6 weeks of vancomycin end date 12/10. · Weekly CBC, CMP, Vanco trough fax reports to [phone], call with critical labs at [phone]. If MRI positive patient would need ESR/CRP every 2 weeks in addition to above-mentioned blood  · Patient encouraged to take a daily probiotic/yogurt           Subjective:     Patient seen in HD. Denies complaints. Back pain is intermittent  AV graft LUE +. - no issues, D/W dialysis RN  Right femoral siteno swelling, discharge  Review of Systems:  A comprehensive review of systems was negative except for that written in the History of Present Illness. 10 point ROS obtained . All other systems negative . Objective:     Blood pressure 136/71, pulse 90, temperature 98.7 °F (37.1 °C), temperature source Oral, resp. rate 18, height 5' 7\" (1.702 m), weight 151 lb 7.3 oz (68.7 kg), SpO2 99 %.   Temp (24hrs), Av.1 °F (36.7 °C), Min:97.6 °F (36.4 °C), Max:98.7 °F (37.1 °C)      Patient Vitals for the past 24 hrs:   Temp Pulse Resp BP SpO2   11/06/20 1125 98.7 °F (37.1 °C) 90 18 136/71    11/06/20 1115  86 18 122/62    11/06/20 1100  86 18 132/65    11/06/20 1045  99 18 (!) 145/86    11/06/20 1030  87 18 124/67    11/06/20 1015  92 18 129/75    11/06/20 1000  88 18 129/63    11/06/20 0945  86 18 124/70    11/06/20 0930  88 18 (!) 143/80    11/06/20 0915  86 18 (!) 151/85    11/06/20 0900  91 18 (!) 154/83    11/06/20 0845  92 18 (!) 147/76    11/06/20 0830  92 18 (!) 144/81    11/06/20 0815  92 18 (!) 142/86    11/06/20 0800  94 18 (!) 161/84    11/06/20 0747 98.2 °F (36.8 °C) 94 18 (!) 152/71    11/06/20 0706 98.2 °F (36.8 °C) 99 16 135/85 99 %   11/06/20 0203 97.9 °F (36.6 °C) 99 18 134/84 97 %   11/05/20 2337 98.7 °F (37.1 °C) 99 18  98 %   11/05/20 2200 98 °F (36.7 °C) 94 18 131/86 100 %   11/05/20 1848 97.6 °F (36.4 °C) 96 20 101/60 99 %   11/05/20 1504 97.8 °F (36.6 °C) 96 18 (!) 118/101 100 %         Lines:  Peripheral IV: AVG    Physical Exam:   General:  Awake, cooperative,   Eyes:  Sclera anicteric. Pupils equally round and reactive to light. Mouth/Throat: Mucous membranes normal, oral pharynx clear   Neck: Supple   Lungs:   reduced auscultation bases   CV:  Regular rate and rhythm,no murmur, click, rub or gallop   Abdomen:   Soft, non-tender.  bowel sounds normal. non-distended   Extremities: No  edema   Skin: Skin color, texture, turgor normal. no acute rash or lesions   Lymph nodes: Cervical and supraclavicular normal   Musculoskeletal: No swelling or deformity, r/femoral site- no swelling   Lines/Devices:  Intact, no erythema, drainage or tenderness   Psych: Alert and oriented, normal mood affect       Data Reviewed:    Studies:      Lab Results   Component Value Date/Time    Culture result: NO GROWTH 2 DAYS 10/29/2020 06:13 PM    Culture result: NO GROWTH 5 DAYS 10/29/2020 11:50 AM    Culture result: (A) 10/20/2020 11:19 PM STAPHYLOCOCCUS EPIDERMIDIS (OXACILLIN RESISTANT) GROWING IN ALL 4 BOTTLES DRAWN (SITE = RAC 1 AND R WIRST)    Culture result: MRSA NOT PRESENT 09/08/2020 06:42 AM    Culture result:  09/08/2020 06:42 AM         Screening of patient nares for MRSA is for surveillance purposes and, if positive, to facilitate isolation considerations in high risk settings. It is not intended for automatic decolonization interventions per se as regimens are not sufficiently effective to warrant routine use. XR Results (most recent):  Results from Hospital Encounter encounter on 10/20/20   XR CHEST PORT    Narrative INDICATION: SOB    EXAM:  AP CHEST RADIOGRAPH    COMPARISON: September 10, 2020    FINDINGS:    AP portable view of the chest demonstrates mild cardiomegaly with unchanged  pacemakers. There is no edema, effusion, consolidation, or pneumothorax. The  osseous structures are unremarkable. Impression IMPRESSION:  No acute process.        Patient Active Problem List   Diagnosis Code    Kidney transplant     Coronary atherosclerosis of native coronary artery I25.10    Nausea & vomiting R11.2    Serum total bilirubin elevated R17    Abdominal pain R10.9    HTN (hypertension) I10    Anemia D64.9    S/P appendectomy Z90.49    High cholesterol E78.00    Other ill-defined conditions(799.89) R69    CAD (coronary artery disease) I25.10    Gastrointestinal disorder K92.9    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[facility]) D69.6    Peritonitis ([facility] K65.9    Malnutrition ([facility] E46    DVT (deep venous thrombosis) ([facility]) I82.409    S/P IVC filter Z95.828    Arterial occlusion I70.90    S/p cadaver renal transplant Z94.0    AICD (automatic cardioverter/defibrillator) present Z95.810    ESRD (end stage renal disease) on dialysis ([facility]) N18.6, Z99.2    Dialysis patient ([facility] Z99.2    Hyperkalemia E87.5    Congestive heart failure, unspecified I50.9    Hypoglycemia E16.2    Sepsis ([facility]) A41.9    Pneumonia J18.9    Abnormal EKG R94.31  ESRD (end stage renal disease) ([facility] N18.6         ICD-10-CM ICD-9-CM    1. Acute hyperkalemia  E87.5 276.7    2. Bacteremia  R78.81 790.7    3. Cystitis  N30.90 595.9    4. AICD (automatic cardioverter/defibrillator) present  Z95.810 V45.02    5. Bacteremia due to methicillin resistant Staphylococcus epidermidis  R78.81 790.7     B95.7 041.19     Z16.29     6. Line sepsis associated with dialysis catheter ([facility]  T82. 7XXA 996.62     A41.9 038.9    7. ESRD (end stage renal disease) ([facility]  N18.6 585.6    8. Essential hypertension  I10 401.9    9. Kidney transplant   V42.0    10. Staphylococcus aureus bacteremia  R78.81 790.7     B95.61 041.11    11. Coronary artery disease involving native heart, angina presence unspecified, unspecified vessel or lesion type  I25.10 414.01    12. Dialysis patient ([facility]  Z99.2 V45.11    13. ESRD (end stage renal disease) on dialysis ([facility]  N18.6 585.6     Z99.2 V45.11    14. High cholesterol  E78.00 272.0    15. Acute midline low back pain without sciatica  M54.5 724.2    16. Abnormal CT scan, lumbar spine  R93.7 793.7        I have discussed the diagnosis with the patient and the intended plan as seen in the above orders. I have discussed medication side effects and warnings with the patient as well.     Reviewed test results at length with patient    Anti-infectives:      Vancomycin IV     Oj Beck

## 2020-11-06 NOTE — PROGRESS NOTES
[facility]    Cardiopulmonary Care Interdisciplinary Rounds were held today to discuss patient's plan of care and outcomes. The following members were present: NP/Physician, Pharmacy, Nursing and Case Management.     PLAN OF CARE:   Continue current treatment plan    Expected Length of Stay:  4d 19h

## 2020-11-06 NOTE — PROGRESS NOTES
Spiritual Care Assessment/Progress Note  [facility]      NAME: Lemuel Hansen      MRN: [id number]  AGE: 39 y.o.  SEX: male  Baptist Affiliation: Jain   Language: English     11/6/2020     Total Time (in minutes): 10     Spiritual Assessment begun in [facility] CARDIOPULMONARY CARE through conversation with:         [x]Patient        [] Family    [] Friend(s)        Reason for Consult: Initial/Spiritual assessment, patient floor     Spiritual beliefs: (Please include comment if needed)     [] Identifies with a brown tradition:         [] Supported by a brown community:            [] Claims no spiritual orientation:           [] Seeking spiritual identity:                [] Adheres to an individual form of spirituality:           [x] Not able to assess:                           Identified resources for coping:      [] Prayer                               [x] Music                  [] Guided Imagery     [x] Family/friends                 [] Pet visits     [] Devotional reading                         [] Unknown     [x] Other: Television/studying technological advancements                                            Interventions offered during this visit: (See comments for more details)    Patient Interventions: Initial/Spiritual assessment, patient floor, Affirmation of emotions/emotional suffering, Catharsis/review of pertinent events in supportive environment, Coping skills reviewed/reinforced, Normalization of emotional/spiritual concerns         Plan of Care:     [] Support spiritual and/or cultural needs    [] Support AMD and/or advance care planning process      [] Support grieving process   [] Coordinate Rites and/or Rituals    [] Coordination with community clergy   [] No spiritual needs identified at this time   [] Detailed Plan of Care below (See Comments)  [] Make referral to Music Therapy  [] Make referral to Pet Therapy     [] Make referral to Addiction services  [] Make referral to Sacred Passages  [] Make referral to Spiritual Care Partner  [] No future visits requested        [x] Follow up visits as needed     Comments: Provided initial spiritual assessment for patient Remigio Cisneros in [address] Introduced  services. Patient articulates no spiritual needs at this time. Processed perceptions of care and history of hospitalizations. Affirmed perception of strong family support (sister) and coping mechanisms during hospitalization. Affirmed desire for knowledge of upcoming test and knowledge of future discharge. Chaplains will follow as able and/or needed.     Sanam 1 EDGAR Oscar 1 Provider   Paging Service [phone]

## 2020-11-07 LAB
ALBUMIN SERPL-MCNC: 2.9 G/DL (ref 3.5–5)
ALBUMIN/GLOB SERPL: 0.7 {RATIO} (ref 1.1–2.2)
ALP SERPL-CCNC: 180 U/L (ref 45–117)
ALT SERPL-CCNC: 12 U/L (ref 12–78)
ANION GAP SERPL CALC-SCNC: 7 MMOL/L (ref 5–15)
AST SERPL-CCNC: 13 U/L (ref 15–37)
BASOPHILS # BLD: 0.2 K/UL (ref 0–0.1)
BASOPHILS NFR BLD: 2 % (ref 0–1)
BILIRUB SERPL-MCNC: 0.4 MG/DL (ref 0.2–1)
BUN SERPL-MCNC: 21 MG/DL (ref 6–20)
BUN/CREAT SERPL: 3 (ref 12–20)
CALCIUM SERPL-MCNC: 9 MG/DL (ref 8.5–10.1)
CHLORIDE SERPL-SCNC: 101 MMOL/L (ref 97–108)
CO2 SERPL-SCNC: 28 MMOL/L (ref 21–32)
CREAT SERPL-MCNC: 6.85 MG/DL (ref 0.7–1.3)
DIFFERENTIAL METHOD BLD: ABNORMAL
EOSINOPHIL # BLD: 0.6 K/UL (ref 0–0.4)
EOSINOPHIL NFR BLD: 6 % (ref 0–7)
ERYTHROCYTE [DISTWIDTH] IN BLOOD BY AUTOMATED COUNT: 17.3 % (ref 11.5–14.5)
GLOBULIN SER CALC-MCNC: 4.2 G/DL (ref 2–4)
GLUCOSE SERPL-MCNC: 78 MG/DL (ref 65–100)
HCT VFR BLD AUTO: 26.8 % (ref 36.6–50.3)
HGB BLD-MCNC: 8.4 G/DL (ref 12.1–17)
IMM GRANULOCYTES # BLD AUTO: 0 K/UL (ref 0–0.04)
IMM GRANULOCYTES NFR BLD AUTO: 0 % (ref 0–0.5)
INR PPP: 1.9 (ref 0.9–1.1)
LYMPHOCYTES # BLD: 1.4 K/UL (ref 0.8–3.5)
LYMPHOCYTES NFR BLD: 16 % (ref 12–49)
MCH RBC QN AUTO: 32.2 PG (ref 26–34)
MCHC RBC AUTO-ENTMCNC: 31.3 G/DL (ref 30–36.5)
MCV RBC AUTO: 102.7 FL (ref 80–99)
MONOCYTES # BLD: 1.4 K/UL (ref 0–1)
MONOCYTES NFR BLD: 16 % (ref 5–13)
NEUTS SEG # BLD: 5.1 K/UL (ref 1.8–8)
NEUTS SEG NFR BLD: 60 % (ref 32–75)
NRBC # BLD: 0 K/UL (ref 0–0.01)
NRBC BLD-RTO: 0 PER 100 WBC
PLATELET # BLD AUTO: 245 K/UL (ref 150–400)
PMV BLD AUTO: 10.3 FL (ref 8.9–12.9)
POTASSIUM SERPL-SCNC: 4.5 MMOL/L (ref 3.5–5.1)
PROT SERPL-MCNC: 7.1 G/DL (ref 6.4–8.2)
PROTHROMBIN TIME: 19.7 SEC (ref 9–11.1)
RBC # BLD AUTO: 2.61 M/UL (ref 4.1–5.7)
SODIUM SERPL-SCNC: 136 MMOL/L (ref 136–145)
WBC # BLD AUTO: 8.6 K/UL (ref 4.1–11.1)

## 2020-11-07 PROCEDURE — 85610 PROTHROMBIN TIME: CPT

## 2020-11-07 PROCEDURE — 65660000001 HC RM ICU INTERMED STEPDOWN

## 2020-11-07 PROCEDURE — 74011250637 HC RX REV CODE- 250/637: Performed by: INTERNAL MEDICINE

## 2020-11-07 PROCEDURE — 74011250636 HC RX REV CODE- 250/636: Performed by: INTERNAL MEDICINE

## 2020-11-07 PROCEDURE — 85025 COMPLETE CBC W/AUTO DIFF WBC: CPT

## 2020-11-07 PROCEDURE — 36415 COLL VENOUS BLD VENIPUNCTURE: CPT

## 2020-11-07 PROCEDURE — 80053 COMPREHEN METABOLIC PANEL: CPT

## 2020-11-07 RX ORDER — WARFARIN 2 MG/1
4 TABLET ORAL ONCE
Status: COMPLETED | OUTPATIENT
Start: 2020-11-07 | End: 2020-11-07

## 2020-11-07 RX ADMIN — HYDROCODONE BITARTRATE AND ACETAMINOPHEN 1 TABLET: 10; 325 TABLET ORAL at 08:29

## 2020-11-07 RX ADMIN — WARFARIN SODIUM 4 MG: 2 TABLET ORAL at 17:55

## 2020-11-07 RX ADMIN — CALCITRIOL CAPSULES 0.25 MCG 0.5 MCG: 0.25 CAPSULE ORAL at 08:28

## 2020-11-07 RX ADMIN — HYDROCODONE BITARTRATE AND ACETAMINOPHEN 1 TABLET: 10; 325 TABLET ORAL at 01:58

## 2020-11-07 RX ADMIN — ASPIRIN 81 MG CHEWABLE TABLET 81 MG: 81 TABLET CHEWABLE at 08:28

## 2020-11-07 RX ADMIN — CALCIUM ACETATE 1334 MG: 667 CAPSULE ORAL at 08:28

## 2020-11-07 RX ADMIN — CALCIUM ACETATE 1334 MG: 667 CAPSULE ORAL at 17:54

## 2020-11-07 RX ADMIN — METOPROLOL SUCCINATE 12.5 MG: 25 TABLET, EXTENDED RELEASE ORAL at 08:28

## 2020-11-07 RX ADMIN — EPOETIN ALFA-EPBX 10000 UNITS: 10000 INJECTION, SOLUTION INTRAVENOUS; SUBCUTANEOUS at 21:20

## 2020-11-07 RX ADMIN — PANTOPRAZOLE SODIUM 40 MG: 40 TABLET, DELAYED RELEASE ORAL at 08:29

## 2020-11-07 RX ADMIN — CALCIUM ACETATE 1334 MG: 667 CAPSULE ORAL at 13:12

## 2020-11-07 RX ADMIN — HYDROCODONE BITARTRATE AND ACETAMINOPHEN 1 TABLET: 10; 325 TABLET ORAL at 18:25

## 2020-11-07 NOTE — ROUTINE PROCESS
[address] END OF SHIFT REPORT Bedside and Verbal shift change report GIVEN TO , RN. Report included the following information Kardex. SIGNIFICANT CHANGES DURING SHIFT:   
 
 
CONCERNS TO ADDRESS WITH MD:   
2023 Pt had a 5 beat v-tach while asleep ,awakened when I entered the room he was asymptomatic . I did notify NP on duty and will call cardio if persist . Pt has a AICD Awaiting lab work [address] NURSING NOTE Admission Date 10/29/2020 Admission Diagnosis Hyperkalemia [E87.5] ESRD (end stage renal disease) ([facility] [N18.6] CAD (coronary artery disease) [I25.10] Consults IP CONSULT TO NEPHROLOGY 
IP CONSULT TO INFECTIOUS DISEASES Cardiac Monitoring [x] Yes [] No  
  
Purposeful Hourly Rounding [x] Yes   
Easton Score Total Score: 1 Easton score 3 or > [x] Bed Alarm [] Avasys [] 1:1 sitter [] Patient refused (Signed refusal form in chart) Yong Score Yong Score: 20 Yong score 14 or < [] PMT consult [] Wound Care consult  
 []  Specialty bed  [] Nutrition consult Influenza Vaccine Oxygen needs? [x] Room air Oxygen @  []1L    []2L    []3L   []4L    []5L   []6L via NC Chronic home O2 use? [] Yes [x] No 
Perform O2 challenge test and document in progress note using [employer]) Last bowel movement Last Bowel Movement Date: 11/02/20 Urinary Catheter LDAs Peripheral IV 93/15/47 Right Basilic (Active) Site Assessment Clean, dry, & intact 11/06/20 1928 Phlebitis Assessment 0 11/06/20 1928 Infiltration Assessment 0 11/06/20 1928 Dressing Status Clean, dry, & intact 11/06/20 1928 Dressing Type Tape;Transparent 11/06/20 1928 Hub Color/Line Status Flushed 11/06/20 1400 Action Taken Open ports on tubing capped 11/04/20 1122 Alcohol Cap Used Yes 11/05/20 1500 Readmission Risk Assessment Tool Score High Risk   
      
 21 Total Score 3 Patient Length of Stay (>5 days = 3) 4 IP Visits Last 12 Months (1-3=4, 4=9, >4=11) 9 Pt. Coverage (Medicare=5 , Medicaid, or Self-Pay=4) 5 Charlson Comorbidity Score (Age + Comorbid Conditions) Criteria that do not apply:  
 Has Seen PCP in Last 6 Months (Yes=3, No=0) . Living with Significant Other. Assisted Living. LTAC. SNF. or  
Rehab Expected Length of Stay 4d 19h Actual Length of Stay 9

## 2020-11-07 NOTE — PROGRESS NOTES
Hospitalist Progress Note    NAME: George Latham   :  1975   MRN:  [id number]   Room Number:  2203/01  @ [facility]       Assessment / Plan:  Sepsis POA, resolved due to MRSE bacteremia POA   Hx of endocarditis  - Positive blood cultures were drawn 12 days ago on 10/20/2020.    - Started on vancomycin at dialysis center on 10/24.    - Suspect due to infected right femoral permacath, removed 10/29/2020.    - Repeat blood cultures from 10/29/2020 no growth to date. - With history of AICD and endocarditis he is at high risk for CIED infection.  - Tip from R femoral HD cath NTD.    - Repeat Bcx NTD  - CT L spine showed irregular lucency around L1-2 endplate. However no paraspinal fluid collection or clear paraspinal inflammation is identified.   - TTE reveals EF 25-30%m no vegetation noted. - Continue Vancomycin  - ID following, recommending MRI lumbar spine with AICD is MRI compatible  - MRI was not done on Friday, and a device rep is required to be present for the MRI, which means it will not be done until Monday  - IV Vanc.   -----Duration-if MRI negative for 2 weeks end date .   -----If MRI positive for OM/discitis, 6 weeks of vancomycin end date 12/10.  Cardiology input is appreciated, to comment on AICD wires. ESRD on HD at [facility]  Hyperkalemia, resolved s/p IV calcium/insulin/HD . K 5.4-->4.4 today, s/p kayexalate . Anemia of CKD  Hx of kidney transplant  Back pain, new onset while inpt, resolved witj no issues mobilizing   Using L AVF for HD    -Underwent HD   -Epo per nephro, cont' phoslo  -hbg has been stable       HTN  CAD s/p AICE  con't home meds: ASA/BB      Diarrhea, resolved        H/o left leg DVT and s/p IVC and history of HIT  On warfarin, appreciate pharmacy dosing warfarin. Daily INR. INR 2 TODAY.   Pharmacy input is appreciated     Code status: Full  Prophylaxis: Coumadin  Recommended Disposition: Home w/Family         Subjective: Chief Complaint / Reason for Physician Visit    Discussed with RN events overnight. She has been working with MRI when getting patient scheduled for imaging on Monday. Patient was seen and examined. No acute events overnight. \"Nothing much\"    Review of Systems:  No fevers, chills, appetite change, cough, sputum production, shortness of breath, dyspnea on exertion, nausea, vomitting, diarrhea, constipation, chest pain, leg edema, abdominal pain, joint pain, rash, itching. Tolerating diet. Objective:     VITALS:   Last 24hrs VS reviewed since prior progress note. Most recent are:  Patient Vitals for the past 24 hrs:   Temp Pulse Resp BP SpO2   11/07/20 0830 (!) 32 °F (0 °C)       11/07/20 0644 98 °F (36.7 °C) 80 19 (!) 151/100 98 %   11/07/20 0311 97.9 °F (36.6 °C) (!) 112 18 (!) 149/87 96 %   11/06/20 2227 98 °F (36.7 °C) 99  121/76 94 %   11/06/20 1850 98.2 °F (36.8 °C) 97 18 (!) 115/59 100 %   11/06/20 1553 98.2 °F (36.8 °C) 99 18 109/60 98 %   11/06/20 1212 99 °F (37.2 °C) 97 18 (!) 140/84 99 %   11/06/20 1125 98.7 °F (37.1 °C) 90 18 136/71    11/06/20 1115  86 18 122/62    11/06/20 1100  86 18 132/65    11/06/20 1045  99 18 (!) 145/86    11/06/20 1030  87 18 124/67    11/06/20 1015  92 18 129/75        Intake/Output Summary (Last 24 hours) at 11/7/2020 1004  Last data filed at 11/6/2020 1928  Gross per 24 hour   Intake 150 ml   Output 2500 ml   Net -2350 ml        PHYSICAL EXAM:  General: Alert, cooperative, no acute distress    EENT:  EOMI. Anicteric sclerae. MMM  Resp:  CTA bilaterally, no wheezing or rales. No accessory muscle use  CV:  Regular  rhythm,  normal S1/S2, no murmurs rubs gallops, No edema  GI:  Soft, Non distended, Non tender. +Bowel sounds  Neurologic:  Alert and oriented X 3, normal speech,   Psych:   Good insight. Not anxious nor agitated  Skin:  No rashes. No jaundice.  Left UE AVG    Reviewed most current lab test results and cultures  YES  Reviewed most current radiology test results   YES  Review and summation of old records today    NO  Reviewed patient's current orders and MAR    YES  PMH/SH reviewed - no change compared to H&P  ________________________________________________________________________  Care Plan discussed with:    Comments   Patient x    Family      RN x    Care Manager x    Consultant                       x Multidiciplinary team rounds were held today with , nursing, pharmacist and clinical coordinator. Patient's plan of care was discussed; medications were reviewed and discharge planning was addressed. ________________________________________________________________________        Comments   >50% of visit spent in counseling and coordination of care x    ________________________________________________________________________  Beckie Husbands, MD     Procedures: see electronic medical records for all procedures/Xrays and details which were not copied into this note but were reviewed prior to creation of Plan. LABS:  I reviewed today's most current labs and imaging studies.   Pertinent labs include:  Recent Labs     11/07/20 0314 11/06/20  0645 11/05/20  0358   WBC 8.6 8.4 8.4   HGB 8.4* 8.2* 8.4*   HCT 26.8* 26.0* 26.5*    250 282     Recent Labs     11/07/20 0314 11/06/20  0645 11/06/20  0300 11/05/20  0358    136  --   --    K 4.5 4.8  --   --     100  --   --    CO2 28 28  --   --    GLU 78 96  --   --    BUN 21* 27*  --   --    CREA 6.85* 9.24*  --   --    CA 9.0 9.1  --   --    PHOS  --  4.7  --   --    ALB 2.9* 2.8*  --   --    TBILI 0.4  --   --   --    ALT 12  --   --   --    INR 1.9*  --  2.2* 2.0*       Signed: Beckie Weems MD

## 2020-11-07 NOTE — PROGRESS NOTES
CM spoke with the patient via phone to discuss his potential d/c today. The patient stated that he typically does home HD 5 days per week but recently he has been going to the home dept at the [facility] on [facility] [address], [address], phone: [phone]) as he was requiring vanc with HD prior to his admission. CM contacted the [facility] on [facility] and ensured that he had been coming into their home dept to get HD due to the patient needing vanc with HD. CM sent the ID note from 11/6/20 to the HD center for their home dept to be made aware of. The patient has declined any type of [facility] services. From CM standpoint, the patient can be discharged today, 11/7/20.      Lizette Grace 111, [address] [employer]

## 2020-11-07 NOTE — PROGRESS NOTES
Pharmacy Daily Dosing of Warfarin    Indication: H/o DVT (s/p IVC)  Goal INR: 2-3    PTA Warfarin Dose: 2.5 mg PO daily    Concurrent anticoagulants: None  Concurrent antiplatelet: Aspirin 81 mg daily    Major Interacting Medications   Drugs that may increase INR: None  Drugs that may decrease INR: None    Conditions that may increase/decrease INR (CHF, C. diff, cirrhosis, thyroid disorder, hypoalbuminemia): None    Labs:  Recent Labs     11/07/20  0314 11/06/20  0645  11/06/20  0300 11/05/20  0358   INR 1.9*  --   --  2.2* 2.0*   HGB 8.4* 8.2*  --   --  8.4*    250  --   --  282   TBILI 0.4  --   --   --   --    ALB 2.9* 2.8*   < >  --   --     < > = values in this interval not displayed. Impression/Plan:   Warfarin 4 mg PO x 1 dose tonight  Daily INR  CBC w/o diff every other day      Pharmacy will follow daily and adjust the dose as appropriate. Thanks  Dora Abarca, PHARMD      [email or web address]. pdf

## 2020-11-07 NOTE — ROUTINE PROCESS
Report received from Aura Gabriel [facility]. SBAR were discussed.  
 
Silver Palmer RN

## 2020-11-08 LAB
ALBUMIN SERPL-MCNC: 3.1 G/DL (ref 3.5–5)
ANION GAP SERPL CALC-SCNC: 8 MMOL/L (ref 5–15)
BUN SERPL-MCNC: 31 MG/DL (ref 6–20)
BUN/CREAT SERPL: 3 (ref 12–20)
CALCIUM SERPL-MCNC: 9.3 MG/DL (ref 8.5–10.1)
CHLORIDE SERPL-SCNC: 100 MMOL/L (ref 97–108)
CO2 SERPL-SCNC: 26 MMOL/L (ref 21–32)
CREAT SERPL-MCNC: 9.56 MG/DL (ref 0.7–1.3)
GLUCOSE SERPL-MCNC: 83 MG/DL (ref 65–100)
INR PPP: 1.9 (ref 0.9–1.1)
PHOSPHATE SERPL-MCNC: 4.9 MG/DL (ref 2.6–4.7)
POTASSIUM SERPL-SCNC: 5.4 MMOL/L (ref 3.5–5.1)
PROTHROMBIN TIME: 19.3 SEC (ref 9–11.1)
SODIUM SERPL-SCNC: 134 MMOL/L (ref 136–145)

## 2020-11-08 PROCEDURE — 74011250637 HC RX REV CODE- 250/637: Performed by: INTERNAL MEDICINE

## 2020-11-08 PROCEDURE — 65270000029 HC RM PRIVATE

## 2020-11-08 PROCEDURE — 80069 RENAL FUNCTION PANEL: CPT

## 2020-11-08 PROCEDURE — 36415 COLL VENOUS BLD VENIPUNCTURE: CPT

## 2020-11-08 PROCEDURE — 85610 PROTHROMBIN TIME: CPT

## 2020-11-08 RX ORDER — WARFARIN 2 MG/1
4 TABLET ORAL ONCE
Status: COMPLETED | OUTPATIENT
Start: 2020-11-08 | End: 2020-11-08

## 2020-11-08 RX ADMIN — PANTOPRAZOLE SODIUM 40 MG: 40 TABLET, DELAYED RELEASE ORAL at 09:48

## 2020-11-08 RX ADMIN — CALCITRIOL CAPSULES 0.25 MCG 0.5 MCG: 0.25 CAPSULE ORAL at 09:47

## 2020-11-08 RX ADMIN — HYDROCODONE BITARTRATE AND ACETAMINOPHEN 1 TABLET: 10; 325 TABLET ORAL at 06:25

## 2020-11-08 RX ADMIN — METOPROLOL SUCCINATE 12.5 MG: 25 TABLET, EXTENDED RELEASE ORAL at 09:47

## 2020-11-08 RX ADMIN — CALCIUM ACETATE 1334 MG: 667 CAPSULE ORAL at 12:15

## 2020-11-08 RX ADMIN — ASPIRIN 81 MG CHEWABLE TABLET 81 MG: 81 TABLET CHEWABLE at 09:48

## 2020-11-08 RX ADMIN — HYDROCODONE BITARTRATE AND ACETAMINOPHEN 1 TABLET: 10; 325 TABLET ORAL at 18:06

## 2020-11-08 RX ADMIN — HYDROCODONE BITARTRATE AND ACETAMINOPHEN 1 TABLET: 10; 325 TABLET ORAL at 00:33

## 2020-11-08 RX ADMIN — WARFARIN SODIUM 4 MG: 2 TABLET ORAL at 17:07

## 2020-11-08 RX ADMIN — HYDROCODONE BITARTRATE AND ACETAMINOPHEN 1 TABLET: 10; 325 TABLET ORAL at 12:15

## 2020-11-08 RX ADMIN — CALCIUM ACETATE 1334 MG: 667 CAPSULE ORAL at 17:07

## 2020-11-08 RX ADMIN — CALCIUM ACETATE 1334 MG: 667 CAPSULE ORAL at 09:47

## 2020-11-08 NOTE — PROGRESS NOTES
Pharmacy Daily Dosing of Warfarin    Indication: H/o DVT (s/p IVC)  Goal INR: 2-3    PTA Warfarin Dose: 2.5 mg PO daily    Concurrent anticoagulants: None  Concurrent antiplatelet: Aspirin 81 mg daily    Major Interacting Medications   Drugs that may increase INR: None  Drugs that may decrease INR: None    Conditions that may increase/decrease INR (CHF, C. diff, cirrhosis, thyroid disorder, hypoalbuminemia): None    Labs:  Recent Labs     11/08/20  0210 11/07/20  0314 11/06/20  0645  11/06/20  0300   INR 1.9* 1.9*  --   --  2.2*   HGB  --  8.4* 8.2*   < >  --    PLT  --  245 250  --   --    TBILI  --  0.4  --   --   --    ALB  --  2.9* 2.8*   < >  --     < > = values in this interval not displayed. Impression/Plan:   Warfarin 4 mg PO x 1 dose tonight  Daily INR  CBC w/o diff every other day      Pharmacy will follow daily and adjust the dose as appropriate. Thanks  CHIQUITA CnotrerasD      [email or web address]/[email or web address]. pdf

## 2020-11-08 NOTE — PROGRESS NOTES
0700: Received bedside report from Neal Jones, off going nurse. Assumed care of patient. TRANSFER - OUT REPORT:    Verbal report given to lisa(name) on [facility]  being transferred to [facility](unit) for routine progression of care       Report consisted of patients Situation, Background, Assessment and   Recommendations(SBAR). Information from the following report(s) SBAR, Kardex, Intake/Output, MAR, Recent Results and Cardiac Rhythm nsr was reviewed with the receiving nurse. Lines:   Peripheral IV 20/73/68 Right Basilic (Active)   Site Assessment Clean, dry, & intact 11/08/20 1600   Phlebitis Assessment 0 11/08/20 1600   Infiltration Assessment 0 11/08/20 1600   Dressing Status Clean, dry, & intact 11/08/20 1600   Dressing Type Transparent 11/08/20 1053   Hub Color/Line Status Green;Flushed;Patent 11/08/20 1053   Action Taken Open ports on tubing capped 11/04/20 1122   Alcohol Cap Used Yes 11/05/20 1500        Opportunity for questions and clarification was provided. Patient transported with:   Monitor  Registered Nurse                Problem: Chronic Renal Failure  Goal: *Fluid and electrolytes stabilized  Outcome: Progressing Towards Goal     Problem: Falls - Risk of  Goal: *Absence of Falls  Description: Document Nirmala Le Fall Risk and appropriate interventions in the flowsheet.   Note: Fall Risk Interventions:            Medication Interventions: Patient to call before getting OOB, Teach patient to arise slowly         History of Falls Interventions: Bed/chair exit alarm         Problem: Arrhythmia Pathway (Adult)  Goal: *Absence of arrhythmia  Outcome: Progressing Towards Goal

## 2020-11-08 NOTE — PROGRESS NOTES
TRANSFER - IN REPORT:    Verbal report received from Roxana Cruz, [facility] (name) on Samantha Smart  being received from [address] (unit) for routine progression of care      Report consisted of patients Situation, Background, Assessment and   Recommendations(SBAR). Information from the following report(s) SBAR, Kardex, ED Summary, Intake/Output, MAR, Recent Results and Cardiac Rhythm (NSR) was reviewed with the receiving nurse. Opportunity for questions and clarification was provided. Assessment completed upon patients arrival to unit and care assumed. Bedside shift change report given to MANUELITO Russ (oncoming nurse) by MANUELITO PINTO (offgoing nurse). Report included the following information SBAR, Kardex, ED Summary, Intake/Output, MAR, Recent Results and Cardiac Rhythm (NSR). Patient arrived at 200. Patients vital signs stabled. Patient settled and comfortable in room and orientated to room and call bell. Dual skin performed with Roxana Cruz RN at this time and telemetry placed on patient.

## 2020-11-08 NOTE — ROUTINE PROCESS
End of Shift Note Bedside shift change report given to Desmond Simmons  (oncoming nurse) by Indra Smith RN (offgoing nurse). Report included the following information SBAR Shift worked:  7am to 8:49pm   
Shift summary and any significant changes:  
  patient quiet, did complain of back pain x2 and was medicated for this. No swelling noted on back. Anuric MRI sheets sent to MRI. He is for this on Monday. Pace maker rep has to be in MRI for procedure and this needs to be resolved on Monday. Concerns for physician to address: For MRI on Monday and also dialysis Zone phone for oncoming shift:   [phone] Activity: 
Activity Level: Up with Assistance, Recliner Number times ambulated in hallways past shift: 0 Number of times OOB to chair past shift: 4, up in chair all day Cardiac:  
Cardiac Monitoring: Yes     
Cardiac Rhythm: Normal sinus rhythm Access:  
Current line(s): PIV Genitourinary:  
Urinary status: anuric Respiratory:  
O2 Device: Room air Chronic home O2 use?: N/A Incentive spirometer at bedside: NO 
  
GI: 
Last Bowel Movement Date: 11/02/20 Current diet:  DIET RENAL Regular Passing flatus: NO Tolerating current diet: YES 
% Diet Eaten: (delivered [address]) Pain Management:  
Patient states pain is manageable on current regimen: YES   He takes Norco prn Skin: 
Yong Score: 20 Interventions: increase time out of bed Patient Safety: 
Fall Score: Total Score: 1 Interventions: pt to call before getting OOB Length of Stay: 
Expected LOS: 4d 19h Actual LOS: 9 Indra Smith RN

## 2020-11-08 NOTE — PROGRESS NOTES
Hospitalist Progress Note    NAME: Amber Yeboah   :  1975   MRN:  [id number]   Room Number:  2203/01  @ [facility]       Assessment / Plan:  Sepsis POA, resolved due to MRSE bacteremia POA   Hx of endocarditis  - Positive blood cultures on 10/20/2020.    - Started on vancomycin at dialysis center on 10/24.    - Right femoral permacath, removed 10/29/2020.    - Repeat blood cultures from 10/29/2020 no growth to date. - Tip from R femoral HD cath NGTD.    - CT L spine showed irregular lucency around L1-2 endplate.  - TTE reveals EF 25-30%m no vegetation noted. - Continue Vancomycin - THIS REQUIRES MONITORING OF DRUG LEVELS  - ID recommending MRI lumbar spine  - AICD is MRI compatible, but REP needs to be there for AICD settings after MRI  - MRI was not done on Friday, and a device rep can't be present until Monday  -----Vanc Duration-if MRI negative for 2 weeks end date .   -----If MRI positive for OM/discitis, 6 weeks of vancomycin end date 12/10. ESRD on HD at [facility]  Hyperkalemia, resolved s/p IV calcium/insulin/HD . K 5.4-->4.4 today, s/p kayexalate . Anemia of CKD  Hx of kidney transplant  Back pain, new onset while inpt, resolved witj no issues mobilizing   Using L AVF for HD    -Underwent HD   -Epo per nephro, cont' phoslo  -hbg has been stable       HTN  CAD s/p AICE  con't home meds: ASA/BB      Diarrhea, resolved        H/o left leg DVT and s/p IVC and history of HIT  On warfarin, appreciate pharmacy dosing warfarin. Daily INR. INR 2 TODAY. Pharmacy input is appreciated     Code status: Full  Prophylaxis: Coumadin  Recommended Disposition: Home w/Family         Subjective:     Chief Complaint / Reason for Physician Visit    Discussed with RN events overnight. She has been working with MRI when getting patient scheduled for imaging on Monday. Patient was seen and examined. No acute events overnight.     \"Back pain\"    Review of Systems:  No fevers, chills, appetite change, cough, sputum production, shortness of breath, dyspnea on exertion, nausea, vomitting, diarrhea, constipation, chest pain, leg edema, abdominal pain, joint pain, rash, itching. Tolerating diet. HE has been ordering [employer] which has made his stay more pleasant      Objective:     VITALS:   Last 24hrs VS reviewed since prior progress note. Most recent are:  Patient Vitals for the past 24 hrs:   Temp Pulse Resp BP SpO2   11/08/20 0947  86  (!) 131/96    11/08/20 0640 98.3 °F (36.8 °C) 88 17 (!) 122/58 99 %   11/08/20 0255 98.2 °F (36.8 °C) 90 16 111/79 98 %   11/08/20 0000 97.9 °F (36.6 °C) 87 16 125/84 98 %   11/07/20 2356  84   97 %   11/07/20 1922 97.8 °F (36.6 °C) 80 17 (!) 139/96 98 %   11/07/20 1732 98.5 °F (36.9 °C) 90 16 117/84 100 %   11/07/20 1036 98 °F (36.7 °C) 81 18 115/76 98 %       Intake/Output Summary (Last 24 hours) at 11/8/2020 1001  Last data filed at 11/8/2020 0255  Gross per 24 hour   Intake 200 ml   Output    Net 200 ml        PHYSICAL EXAM:  General: Alert, cooperative, no acute distress    EENT:  EOMI. Anicteric sclerae. MMM  Resp:  CTA bilaterally, no wheezing or rales. No accessory muscle use  CV:  Regular  rhythm,  normal S1/S2, no murmurs rubs gallops, No edema  GI:  Soft, Non distended, Non tender. +Bowel sounds  Neurologic:  Alert and oriented X 3, normal speech,   Psych:   Good insight. Not anxious nor agitated  Skin:  No rashes. No jaundice.  Left UE AVG    Reviewed most current lab test results and cultures  YES  Reviewed most current radiology test results   YES  Review and summation of old records today    NO  Reviewed patient's current orders and MAR    YES  PMH/SH reviewed - no change compared to H&P  ________________________________________________________________________  Care Plan discussed with:    Comments   Patient x    Family      RN x    Care Manager x    Consultant                       x Multidiciplinary team rounds were held today with nursing. Patient's plan of care was discussed; medications were reviewed and discharge planning was addressed. ________________________________________________________________________        Comments   >50% of visit spent in counseling and coordination of care     ________________________________________________________________________  Raiza Yap MD     Procedures: see electronic medical records for all procedures/Xrays and details which were not copied into this note but were reviewed prior to creation of Plan. LABS:  I reviewed today's most current labs and imaging studies.   Pertinent labs include:  Recent Labs     11/07/20 0314 11/06/20  0645   WBC 8.6 8.4   HGB 8.4* 8.2*   HCT 26.8* 26.0*    250     Recent Labs     11/08/20  0210 11/07/20 0314 11/06/20  0645 11/06/20  0300   NA  --  136 136  --    K  --  4.5 4.8  --    CL  --  101 100  --    CO2  --  28 28  --    GLU  --  78 96  --    BUN  --  21* 27*  --    CREA  --  6.85* 9.24*  --    CA  --  9.0 9.1  --    PHOS  --   --  4.7  --    ALB  --  2.9* 2.8*  --    TBILI  --  0.4  --   --    ALT  --  12  --   --    INR 1.9* 1.9*  --  2.2*       Signed: Raiza Yap MD

## 2020-11-09 LAB
ALBUMIN SERPL-MCNC: 3 G/DL (ref 3.5–5)
ANION GAP SERPL CALC-SCNC: 8 MMOL/L (ref 5–15)
BASOPHILS # BLD: 0.1 K/UL (ref 0–0.1)
BASOPHILS NFR BLD: 1 % (ref 0–1)
BUN SERPL-MCNC: 36 MG/DL (ref 6–20)
BUN/CREAT SERPL: 3 (ref 12–20)
CALCIUM SERPL-MCNC: 9.1 MG/DL (ref 8.5–10.1)
CHLORIDE SERPL-SCNC: 98 MMOL/L (ref 97–108)
CO2 SERPL-SCNC: 27 MMOL/L (ref 21–32)
CREAT SERPL-MCNC: 10.9 MG/DL (ref 0.7–1.3)
DIFFERENTIAL METHOD BLD: ABNORMAL
EOSINOPHIL # BLD: 0.7 K/UL (ref 0–0.4)
EOSINOPHIL NFR BLD: 7 % (ref 0–7)
ERYTHROCYTE [DISTWIDTH] IN BLOOD BY AUTOMATED COUNT: 17 % (ref 11.5–14.5)
GLUCOSE SERPL-MCNC: 80 MG/DL (ref 65–100)
HCT VFR BLD AUTO: 26.9 % (ref 36.6–50.3)
HGB BLD-MCNC: 8.5 G/DL (ref 12.1–17)
IMM GRANULOCYTES # BLD AUTO: 0 K/UL (ref 0–0.04)
IMM GRANULOCYTES NFR BLD AUTO: 0 % (ref 0–0.5)
INR PPP: 2.3 (ref 0.9–1.1)
LYMPHOCYTES # BLD: 1.3 K/UL (ref 0.8–3.5)
LYMPHOCYTES NFR BLD: 15 % (ref 12–49)
MCH RBC QN AUTO: 32.1 PG (ref 26–34)
MCHC RBC AUTO-ENTMCNC: 31.6 G/DL (ref 30–36.5)
MCV RBC AUTO: 101.5 FL (ref 80–99)
MONOCYTES # BLD: 1 K/UL (ref 0–1)
MONOCYTES NFR BLD: 12 % (ref 5–13)
NEUTS SEG # BLD: 5.7 K/UL (ref 1.8–8)
NEUTS SEG NFR BLD: 65 % (ref 32–75)
NRBC # BLD: 0 K/UL (ref 0–0.01)
NRBC BLD-RTO: 0 PER 100 WBC
PHOSPHATE SERPL-MCNC: 5.2 MG/DL (ref 2.6–4.7)
PLATELET # BLD AUTO: 225 K/UL (ref 150–400)
PMV BLD AUTO: 10.5 FL (ref 8.9–12.9)
POTASSIUM SERPL-SCNC: 5.5 MMOL/L (ref 3.5–5.1)
PROTHROMBIN TIME: 23.5 SEC (ref 9–11.1)
RBC # BLD AUTO: 2.65 M/UL (ref 4.1–5.7)
SODIUM SERPL-SCNC: 133 MMOL/L (ref 136–145)
VANCOMYCIN SERPL-MCNC: 16.4 UG/ML
WBC # BLD AUTO: 8.9 K/UL (ref 4.1–11.1)

## 2020-11-09 PROCEDURE — 65270000029 HC RM PRIVATE

## 2020-11-09 PROCEDURE — 85610 PROTHROMBIN TIME: CPT

## 2020-11-09 PROCEDURE — 80069 RENAL FUNCTION PANEL: CPT

## 2020-11-09 PROCEDURE — 85025 COMPLETE CBC W/AUTO DIFF WBC: CPT

## 2020-11-09 PROCEDURE — 80202 ASSAY OF VANCOMYCIN: CPT

## 2020-11-09 PROCEDURE — 90935 HEMODIALYSIS ONE EVALUATION: CPT

## 2020-11-09 PROCEDURE — 74011250637 HC RX REV CODE- 250/637: Performed by: INTERNAL MEDICINE

## 2020-11-09 PROCEDURE — 99232 SBSQ HOSP IP/OBS MODERATE 35: CPT | Performed by: INTERNAL MEDICINE

## 2020-11-09 PROCEDURE — 36415 COLL VENOUS BLD VENIPUNCTURE: CPT

## 2020-11-09 PROCEDURE — 74011250636 HC RX REV CODE- 250/636: Performed by: INTERNAL MEDICINE

## 2020-11-09 RX ADMIN — HYDROCODONE BITARTRATE AND ACETAMINOPHEN 1 TABLET: 10; 325 TABLET ORAL at 06:25

## 2020-11-09 RX ADMIN — HYDROCODONE BITARTRATE AND ACETAMINOPHEN 1 TABLET: 10; 325 TABLET ORAL at 19:49

## 2020-11-09 RX ADMIN — CALCIUM ACETATE 1334 MG: 667 CAPSULE ORAL at 12:22

## 2020-11-09 RX ADMIN — WARFARIN SODIUM 2.5 MG: 2 TABLET ORAL at 17:42

## 2020-11-09 RX ADMIN — ASPIRIN 81 MG CHEWABLE TABLET 81 MG: 81 TABLET CHEWABLE at 12:23

## 2020-11-09 RX ADMIN — CALCITRIOL CAPSULES 0.25 MCG 0.5 MCG: 0.25 CAPSULE ORAL at 12:23

## 2020-11-09 RX ADMIN — HYDROCODONE BITARTRATE AND ACETAMINOPHEN 1 TABLET: 10; 325 TABLET ORAL at 13:00

## 2020-11-09 RX ADMIN — CALCIUM ACETATE 1334 MG: 667 CAPSULE ORAL at 17:42

## 2020-11-09 RX ADMIN — PANTOPRAZOLE SODIUM 40 MG: 40 TABLET, DELAYED RELEASE ORAL at 06:36

## 2020-11-09 RX ADMIN — HYDROCODONE BITARTRATE AND ACETAMINOPHEN 1 TABLET: 10; 325 TABLET ORAL at 00:09

## 2020-11-09 RX ADMIN — METOPROLOL SUCCINATE 12.5 MG: 25 TABLET, EXTENDED RELEASE ORAL at 12:23

## 2020-11-09 RX ADMIN — VANCOMYCIN HYDROCHLORIDE 750 MG: 750 INJECTION, POWDER, LYOPHILIZED, FOR SOLUTION INTRAVENOUS at 14:01

## 2020-11-09 NOTE — PROGRESS NOTES
1215: Patient returned from Dialysis curious about when his MRI was. Writer called down to MRI; MRI informed writer that MRI would possibly be tomorrow. They came to get him at 1000 but was in Dialysis.

## 2020-11-09 NOTE — PROGRESS NOTES
Problem: Chronic Renal Failure  Goal: *Fluid and electrolytes stabilized  Outcome: Progressing Towards Goal     Problem: Patient Education: Go to Patient Education Activity  Goal: Patient/Family Education  Outcome: Progressing Towards Goal     Problem: Falls - Risk of  Goal: *Absence of Falls  Description: Document Sandi Cleveland Fall Risk and appropriate interventions in the flowsheet.   Outcome: Progressing Towards Goal  Note: Fall Risk Interventions:            Medication Interventions: Teach patient to arise slowly         History of Falls Interventions: Bed/chair exit alarm         Problem: Patient Education: Go to Patient Education Activity  Goal: Patient/Family Education  Outcome: Progressing Towards Goal     Problem: Hypertension  Goal: *Blood pressure within specified parameters  Outcome: Progressing Towards Goal  Goal: *Fluid volume balance  Outcome: Progressing Towards Goal  Goal: *Labs within defined limits  Outcome: Progressing Towards Goal     Problem: Patient Education: Go to Patient Education Activity  Goal: Patient/Family Education  Outcome: Progressing Towards Goal     Problem: Arrhythmia Pathway (Adult)  Goal: *Absence of arrhythmia  Outcome: Progressing Towards Goal     Problem: Patient Education: Go to Patient Education Activity  Goal: Patient/Family Education  Outcome: Progressing Towards Goal

## 2020-11-09 NOTE — PROGRESS NOTES
General Surgery End of Shift Nursing Note    Bedside shift change report given to Luanna Schaumann, RN (oncoming nurse) by Jessenia Lindsay RN (offgoing nurse). Report included the following information SBAR, Kardex, ED Summary, Procedure Summary, Intake/Output, MAR, Recent Results and Cardiac Rhythm (NSR). Shift worked:   7a-7p   Summary of shift:   Uneventful. Patient to dialysis at start of shift. Returned shortly after lunch. Patient tolerating diet well no bowel movement. Patient did not go to MRI today, was in Dialysis when they sent for patient rescheduled for tomorrow per MRI. Treated for pain once this shift. Patient remained up in chair most of day. Issues for physician to address: n/a     Number times ambulated in hallway past shift: 0    Number of times OOB to chair past shift: 2    Pain Management:  Current medication: Norco  Patient states pain is manageable on current pain medication: YES    GI:    Current diet:  DIET RENAL Regular    Tolerating current diet: YES  Passing flatus: YES  Last Bowel Movement: several days ago      Respiratory:      Patient Safety:    Falls Score: 1  Bed Alarm On? No  Sitter?  Not applicable    Judy Fothergill, RN

## 2020-11-09 NOTE — PROGRESS NOTES
Nephrology Progress Note  Blaise Leblanc     [email or web address]  Phone - [phone]   Patient: Peña Matute    YOB: 1975     Admit Date: 10/29/2020   Date- 11/9/2020     CC: Follow up for ESRD        IMPRESSION & PLAN:   · ESRD- home hd- F/B [facility] nephrologist- HE HAS BEEN ON MWF HD AT UNIT - as he has been on vanco prior to admission  · Hyperkalemia  · Sepsis - staph epi. S/p femoral hd cath removal  · Anemia of ckd  · Sec. hyperpara  · Hx of renal tx inpast.  · Hypertension  · CAD  · H/o DVT, s/p ivc filter/ h/o HIT  ·     PLAN-   Seen on dialysis today   Remove 2 kg   Continue hd mwf schedule   He can get vanco with hd as out pt   Continue phoslol   Continue epogen   Continue calcitriol     Subjective: Interval History:   -Seen on dialysis today  Blood pressure stable  No C/O of chest pain,SOB, vomiting, abdominal pain,fever,chills      ROS:- As documented above  Objective:   Vitals:    11/09/20 0004 11/09/20 0337 11/09/20 0725 11/09/20 0750   BP: 136/80 130/89 (!) 151/100 (!) 148/84   Pulse: 96 94 (!) 105 92   Resp: 16 16 16 18   Temp: 98.1 °F (36.7 °C) 98.4 °F (36.9 °C) 97.5 °F (36.4 °C) 98 °F (36.7 °C)   TempSrc:    Oral   SpO2: 99% 100% 100%    Weight:       Height:          11/08 0701 - 11/09 0700  In: 1260 [P.O.:1260]  Out: 0   Last 3 Recorded Weights in this Encounter    11/06/20 0358 11/07/20 0433 11/08/20 0151   Weight: 68.7 kg (151 lb 7.3 oz) 67.3 kg (148 lb 5.9 oz) 68.6 kg (151 lb 4.8 oz)      Physical exam:   GEN: NAD  NECK- Supple, no mass  RESP: No wheezing, clear bilaterally   NEURO: Normal speech, nonfocal   EXT: left arm avg +  PSYCH: Normal Mood    Chart reviewed. Pertinent Notes reviewed.      Data Review :  Recent Labs     11/09/20  0748 11/08/20  1300 11/07/20  0314   * 134* 136   K 5.5* 5.4* 4.5   CL 98 100 101   CO2 27 26 28   BUN 36* 31* 21*   CREA 10.90* 9.56* 6.85*   GLU 80 83 78   CA 9.1 9.3 9.0   PHOS 5.2* 4.9*  -- Recent Labs     11/09/20  0347 11/07/20  0314   WBC 8.9 8.6   HGB 8.5* 8.4*   HCT 26.9* 26.8*    245     No results for input(s): FE, TIBC, PSAT, FERR in the last 72 hours.    Medication list  reviewed  Current Facility-Administered Medications   Medication Dose Route Frequency    hydrALAZINE (APRESOLINE) 20 mg/mL injection 10 mg  10 mg IntraVENous Q6H PRN    WARFARIN INFORMATION NOTE (COUMADIN)   Other QPM    calcium acetate(phosphat bind) (PHOSLO) capsule 1,334 mg  2 Cap Oral TID WITH MEALS    epoetin emilie-epbx (RETACRIT) injection 10,000 Units  10,000 Units SubCUTAneous Q TUE, THU & SAT    acetaminophen (TYLENOL) tablet 500 mg  500 mg Oral Q4H PRN    aspirin chewable tablet 81 mg  81 mg Oral DAILY    calcitRIOL (ROCALTROL) capsule 0.5 mcg  0.5 mcg Oral DAILY    metoprolol succinate (TOPROL-XL) XL tablet 12.5 mg  12.5 mg Oral DAILY    pantoprazole (PROTONIX) tablet 40 mg  40 mg Oral ACB    ondansetron (ZOFRAN) injection 4 mg  4 mg IntraVENous Q6H PRN    morphine injection 1-2 mg  1-2 mg IntraVENous Q4H PRN    Vancomycin - Pharmacy to Dose   Other Rx Dosing/Monitoring    traMADoL (ULTRAM) tablet 50 mg  50 mg Oral Q6H PRN    HYDROcodone-acetaminophen (NORCO)  mg tablet 1 Tab  1 Tab Oral Q6H PRN          Regina Carlin MD              [facility]  [facility] [employer]  [address], [address], [address]  Phone - [phone]               Fax - [phone]

## 2020-11-09 NOTE — PROCEDURES
[facility] Dialysis Team [facility]  [phone]    Vitals   Pre   Post   Assessment   Pre   Post     Temp  Temp: 98 °F (36.7 °C) (11/09/20 0750) 98.2 LOC  AxOx4 AxOx4   HR   Pulse (Heart Rate): 92 (11/09/20 0750) 84 Lungs   CTA juan f Even and unlabored   B/P   BP: (!) 148/84 (11/09/20 0750) 153/84 Cardiac   RRR RRR    Resp   Resp Rate: 18 (11/09/20 0750) 18 Skin   intact intact    Pain level  Pain Intensity 1: 0 (11/09/20 0725) 0 Edema  generalized   generalized   Orders:    Duration:   Start:    1202 End:    1124 Total:   3.5 hrs   Dialyzer:   Dialyzer/Set Up Inspection: Sylvester Solorio (11/09/20 0750)   K Bath:   Dialysate K (mEq/L): 2 (11/09/20 0750)   Ca Bath:   Dialysate CA (mEq/L): 2.5 (11/09/20 0750)   Na/Bicarb:   Dialysate NA (mEq/L): 140 (11/09/20 0750)   Target Fluid Removal:   Goal/Amount of Fluid to Remove (mL): 2500 mL (11/09/20 0750)   Access     Type & Location:   Left upper arm AV Graft without evidence of warmth, redness, or drainage. +thrill/+bruit. Each access site disinfected for 60 seconds per site with alcohol swabs per P&P. Cannulated with 15G needles x2 and secured with paper tape. +aspiration/+flushed.    Labs     Obtained/Reviewed   Critical Results Called   Date when labs were drawn-  Hgb-    HGB   Date Value Ref Range Status   11/09/2020 8.5 (L) 12.1 - 17.0 g/dL Final     K-    Potassium   Date Value Ref Range Status   11/09/2020 5.5 (H) 3.5 - 5.1 mmol/L Final     Ca-   Calcium   Date Value Ref Range Status   11/09/2020 9.1 8.5 - 10.1 MG/DL Final     Bun-   BUN   Date Value Ref Range Status   11/09/2020 36 (H) 6 - 20 MG/DL Final     Creat-   Creatinine   Date Value Ref Range Status   11/09/2020 10.90 (H) 0.70 - 1.30 MG/DL Final        Medications/ Blood Products Given     Name   Dose   Route and Time     none                Blood Volume Processed (BVP):   75.1 L Net Fluid   Removed:  2500 mL   Comments   Time Out Done:  2265  Primary Nurse Rpt Pre: Melva Leyden, RN  Primary Nurse Rpt Post: Kieran Raymundo RN  Pt Education: infection control / procedural  Care Plan: continue current HD plan of care  Tx Summary:  26 HD treatment initiated per physician order. [id number] Telemetry tech called to report patient had 21 beats of consecutive VTach. Patient immediately assessed. Denies SOB/CP. Vitals stable. Dr Tiffany Mauricio called and notified - telephone orders with read-back to continue dialysis and monitor. 1124 HD treatment completed per physician order. All possible blood returned to patient. Hemostasis achieved in <10 minutes. Access site dressings clean, dry, and intact. +thrill. Admiting Diagnosis: Hyperkalemia / ESRD  Pt's previous clinic-   Consent signed - Informed Consent Verified: Yes (11/09/20 0750)  Winter Consent - verified  Hepatitis Status- 2/11/20 neg/imm  Machine #- Machine Number: [id number] (11/09/20 0750)  Telemetry status- remote  Pre-dialysis wt. - Pre-Dialysis Weight: 72.3 kg (159 lb 6.3 oz) (10/29/20 1830)

## 2020-11-09 NOTE — PROGRESS NOTES
Infectious Disease Progress Note    IMPRESSION:   · MRSE bacteremia secondary to presumed line infection  · Blood cultures positive 4 /4 on 10/20, patient has been on vancomycin IV since 10/24 at HD. · Repeat blood culturesNG 10/29  · Right femoral permacath removed 10/29, catheter tip culture NG  · End-stage renal disease on HD,s/p failed renal transplant x2  · AICD devices x 2 present, palpable no swelling or erythema or discharge suggestive of infection. · History of MSSA bacteremia secondary to infected femoral line , positive blood cultures 6/2017  · History of endocarditis per chart notes, patient denies history  · Echo-negative for vegetation on valve AICD  · S/p back pain. CT lumbar spine irregular lucency L1/2 endplate, and fluid collection. OM/discitis not excluded. For MRI         PLAN:      · Plan for DC on vancomycin 500 mg IV with HD 3 times weekly, target trough 20-25. Pharmacy to adjust dose as needed. Duration-if MRI negative for 2 weeks end date 11/12. If MRI positive for OM/discitis patient will need 6 weeks of vancomycin end date 12/10. Pt still waiting on MRI  · Weekly CBC, CMP, Vanco trough fax reports to [phone], call with critical labs at [phone]. If MRI positive patient would need ESR/CRP every 2 weeks in addition to above-mentioned blood  · Patient encouraged to take a daily probiotic/yogurt           Subjective:     Patient seen in HD. Resting, arousable   Denies complaints. Not had MRI as yet , was ordered on 11/5. D/w RN- s/p runs of V tach, pt asymptomatic   Back pain is less. AV graft LUE +- no issues, D/W dialysis RN  Right femoral siteno swelling, discharge. Review of Systems:  A comprehensive review of systems was negative except for that written in the History of Present Illness. 10 point ROS obtained . All other systems negative . Objective:     Blood pressure (!) 148/84, pulse 92, temperature 98 °F (36.7 °C), temperature source Oral, resp.  rate 18, height 5' 7\" (1.702 m), weight 151 lb 4.8 oz (68.6 kg), SpO2 100 %. Temp (24hrs), Av.1 °F (36.7 °C), Min:97.5 °F (36.4 °C), Max:98.5 °F (36.9 °C)      Patient Vitals for the past 24 hrs:   Temp Pulse Resp BP SpO2   20 0750 98 °F (36.7 °C) 92 18 (!) 148/84    20 0725 97.5 °F (36.4 °C) (!) 105 16 (!) 151/100 100 %   20 0337 98.4 °F (36.9 °C) 94 16 130/89 100 %   20 0004 98.1 °F (36.7 °C) 96 16 136/80 99 %   20 1945 98.5 °F (36.9 °C) 99 16 126/83 99 %   20 1821 97.7 °F (36.5 °C) 97 16 129/78 100 %   20 1600 98.4 °F (36.9 °C) 90 16 112/64 99 %   20 1053 98.1 °F (36.7 °C) 87 16 128/70 99 %         Lines:  Peripheral IV: AVG    Physical Exam:   General:  Awake, cooperative,   Eyes:  Sclera anicteric. Pupils equally round and reactive to light. Mouth/Throat: Mucous membranes normal, oral pharynx clear   Neck: Supple   Lungs:   reduced auscultation bases   CV:  Regular rate and rhythm,no murmur, click, rub or gallop   Abdomen:   Soft, non-tender.  bowel sounds normal. non-distended   Extremities: No  edema   Skin: Skin color, texture, turgor normal. no acute rash or lesions   Lymph nodes: Cervical and supraclavicular normal   Musculoskeletal: No swelling or deformity, r/femoral site- no swelling   Lines/Devices:  Intact, no erythema, drainage or tenderness   Psych: Alert and oriented, normal mood affect       Data Reviewed:    Studies:      Lab Results   Component Value Date/Time    Culture result: NO GROWTH 2 DAYS 10/29/2020 06:13 PM    Culture result: NO GROWTH 5 DAYS 10/29/2020 11:50 AM    Culture result: (A) 10/20/2020 11:19 PM     STAPHYLOCOCCUS EPIDERMIDIS (OXACILLIN RESISTANT) GROWING IN ALL 4 BOTTLES DRAWN (SITE = RAC 1 AND R WIRST)    Culture result: MRSA NOT PRESENT 2020 06:42 AM    Culture result:  2020 06:42 AM         Screening of patient nares for MRSA is for surveillance purposes and, if positive, to facilitate isolation considerations in high risk settings. It is not intended for automatic decolonization interventions per se as regimens are not sufficiently effective to warrant routine use. XR Results (most recent):  Results from Hospital Encounter encounter on 10/20/20   XR CHEST PORT    Narrative INDICATION: SOB    EXAM:  AP CHEST RADIOGRAPH    COMPARISON: September 10, 2020    FINDINGS:    AP portable view of the chest demonstrates mild cardiomegaly with unchanged  pacemakers. There is no edema, effusion, consolidation, or pneumothorax. The  osseous structures are unremarkable. Impression IMPRESSION:  No acute process. Patient Active Problem List   Diagnosis Code    Kidney transplant     Coronary atherosclerosis of native coronary artery I25.10    Nausea & vomiting R11.2    Serum total bilirubin elevated R17    Abdominal pain R10.9    HTN (hypertension) I10    Anemia D64.9    S/P appendectomy Z90.49    High cholesterol E78.00    Other ill-defined conditions(799.89) R69    CAD (coronary artery disease) I25.10    Gastrointestinal disorder K92.9    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[facility]) D69.6    Peritonitis ([facility] K65.9    Malnutrition ([facility] E46    DVT (deep venous thrombosis) ([facility]) I82.409    S/P IVC filter Z95.828    Arterial occlusion I70.90    S/p cadaver renal transplant Z94.0    AICD (automatic cardioverter/defibrillator) present Z95.810    ESRD (end stage renal disease) on dialysis ([facility]) N18.6, Z99.2    Dialysis patient ([facility] Z99.2    Hyperkalemia E87.5    Congestive heart failure, unspecified I50.9    Hypoglycemia E16.2    Sepsis ([facility]) A41.9    Pneumonia J18.9    Abnormal EKG R94.31    ESRD (end stage renal disease) ([facility] N18.6         ICD-10-CM ICD-9-CM    1. Acute hyperkalemia  E87.5 276.7    2. Bacteremia  R78.81 790.7    3. Cystitis  N30.90 595.9    4. AICD (automatic cardioverter/defibrillator) present  Z95.810 V45.02    5.  Bacteremia due to methicillin resistant Staphylococcus epidermidis  R78.81 790.7     B95.7 041.19     Z16.29     6. Line sepsis associated with dialysis catheter ([facility]  T82. 7XXA 996.62     A41.9 038.9    7. ESRD (end stage renal disease) ([facility]  N18.6 585.6    8. Essential hypertension  I10 401.9    9. Kidney transplant   V42.0    10. Staphylococcus aureus bacteremia  R78.81 790.7     B95.61 041.11    11. Coronary artery disease involving native heart, angina presence unspecified, unspecified vessel or lesion type  I25.10 414.01    12. Dialysis patient ([facility]  Z99.2 V45.11    13. ESRD (end stage renal disease) on dialysis ([facility]  N18.6 585.6     Z99.2 V45.11    14. High cholesterol  E78.00 272.0    15. Acute midline low back pain without sciatica  M54.5 724.2    16. Abnormal CT scan, lumbar spine  R93.7 793.7        I have discussed the diagnosis with the patient and the intended plan as seen in the above orders. I have discussed medication side effects and warnings with the patient as well.     Reviewed test results at length with patient    Anti-infectives:      Vancomycin IV     Rain Garcia MD [address]

## 2020-11-09 NOTE — PROGRESS NOTES
General Surgery End of Shift Nursing Note    Bedside shift change report given to Blanca (oncoming nurse) by [facility] (offgoing nurse). Report included the following information SBAR, Kardex, Intake/Output, MAR and Recent Results. Shift worked:   [id number]   Summary of shift:    Uneventful shift. Pt medicated for back pain as needed. Report given to dialysis nurse. Issues for physician to address:   none     Number times ambulated in hallway past shift: 0    Number of times OOB to chair past shift: stayed in recliner    Pain Management:  Current medication: norco  Patient states pain is manageable on current pain medication: YES    GI:    Current diet:  DIET RENAL Regular    Tolerating current diet: YES  Passing flatus: YES  Last Bowel Movement: several days ago    Patient Safety:    Falls Score: 1  Bed Alarm On? No  Sitter?  No    Kirstin Boudreaux RN

## 2020-11-09 NOTE — PROGRESS NOTES
Hospitalist Progress Note    NAME: Mike Saxena   :  1975   MRN:  [id number]   Room Number:  2136/01  @ [facility]       Assessment / Plan:  Sepsis POA, resolved due to MRSE bacteremia POA   Hx of endocarditis  - Positive blood cultures on 10/20/2020.    - Started on vancomycin at dialysis center on 10/24.    - Right femoral permacath, removed 10/29/2020.    - Repeat blood cultures from 10/29/2020 no growth to date. - Tip from R femoral HD cath NGTD.    - CT L spine showed irregular lucency around L1-2 endplate.  - TTE reveals EF 25-30%m no vegetation noted. - Continue Vancomycin - THIS REQUIRES MONITORING OF DRUG LEVELS  - ID recommending MRI lumbar spine  - AICD is MRI compatible, but REP needs to be there for AICD settings after MRI  - MRI was not done on Friday, and a device rep can't be present until Monday  -----Vanc Duration-if MRI negative for 2 weeks end date .   -----If MRI positive for OM/discitis, 6 weeks of vancomycin end date 12/10. Patient still awaiting MRI to be done      ESRD on HD at [facility]  Hyperkalemia, resolved s/p IV calcium/insulin/HD . K 5.4-->4.4 today, s/p kayexalate . Anemia of CKD  Hx of kidney transplant  Back pain, new onset while inpt, resolved witj no issues mobilizing   Using L AVF for HD    -Underwent HD   -Epo per nephro, cont' phoslo  -hbg has been stable       HTN  CAD s/p AICE  con't home meds: ASA/BB      Diarrhea, resolved        H/o left leg DVT and s/p IVC and history of HIT  On warfarin, appreciate pharmacy dosing warfarin. Daily INR. INR 2 TODAY. Pharmacy input is appreciated     Code status: Full  Prophylaxis: Coumadin  Recommended Disposition: Home w/Family         Subjective:     Chief Complaint / Reason for Physician Visit    Discussed with RN events overnight. Patient was seen and examined. No acute events overnight.       \"doing fine\"    Review of Systems:  Symptom Y/N Comments  Symptom Y/N Comments Fever/Chills n   Chest Pain n    Poor Appetite    Edema     Cough n   Abdominal Pain n    Sputum    Joint Pain     SOB/ZAMUDIO n   Pruritis/Rash     Nausea/vomit n   Tolerating PT/OT     Diarrhea    Tolerating Diet y    Constipation    Other       Could NOT obtain due to:            Objective:     VITALS:   Last 24hrs VS reviewed since prior progress note. Most recent are:  Patient Vitals for the past 24 hrs:   Temp Pulse Resp BP SpO2   11/09/20 1511 99.3 °F (37.4 °C) 95 18 (!) 114/56 100 %   11/09/20 1211 98.2 °F (36.8 °C) (!) 103 18 (!) 138/95 100 %   11/09/20 1124 98.2 °F (36.8 °C) 84 18 (!) 153/84    11/09/20 1115  84 18 (!) 149/79    11/09/20 1100  73 18 133/65    11/09/20 1045  71 18 131/73    11/09/20 1030  72 18 128/75    11/09/20 1015  71 18 123/68    11/09/20 1000  76 18 135/80    11/09/20 0945  79 18 116/72    11/09/20 0930  78 18 114/73    11/09/20 0915  85 18 122/70    11/09/20 0900  85 18 125/78    11/09/20 0845  83 18 (!) 143/82    11/09/20 0830  90 18 (!) 153/77    11/09/20 0815  87 18 (!) 125/55    11/09/20 0800  90 18 (!) 134/51    11/09/20 0750 98 °F (36.7 °C) 92 18 (!) 148/84    11/09/20 0725 97.5 °F (36.4 °C) (!) 105 16 (!) 151/100 100 %   11/09/20 0337 98.4 °F (36.9 °C) 94 16 130/89 100 %   11/09/20 0004 98.1 °F (36.7 °C) 96 16 136/80 99 %   11/08/20 1945 98.5 °F (36.9 °C) 99 16 126/83 99 %   11/08/20 1821 97.7 °F (36.5 °C) 97 16 129/78 100 %       Intake/Output Summary (Last 24 hours) at 11/9/2020 1630  Last data filed at 11/9/2020 1300  Gross per 24 hour   Intake 1020 ml   Output 2500 ml   Net -1480 ml        PHYSICAL EXAM:  General: Alert, cooperative, no acute distress    EENT:  EOMI. Anicteric sclerae. MMM  Resp:  CTA bilaterally, no wheezing or rales. No accessory muscle use  CV:  Regular  rhythm,  normal S1/S2, no murmurs rubs gallops, No edema  GI:  Soft, Non distended, Non tender.   +Bowel sounds  Neurologic:  Alert and oriented X 3, normal speech,   Psych: Good insight. Not anxious nor agitated  Skin:  No rashes. No jaundice. Left UE AVG    Reviewed most current lab test results and cultures  YES  Reviewed most current radiology test results   YES  Review and summation of old records today    NO  Reviewed patient's current orders and MAR    YES  PMH/SH reviewed - no change compared to H&P  ________________________________________________________________________  Care Plan discussed with:    Comments   Patient x    Family      RN x    Care Manager     Consultant                        Multidiciplinary team rounds were held today with nursing. Patient's plan of care was discussed; medications were reviewed and discharge planning was addressed. ________________________________________________________________________        Comments   >50% of visit spent in counseling and coordination of care     ________________________________________________________________________  Rich Ramirez MD     Procedures: see electronic medical records for all procedures/Xrays and details which were not copied into this note but were reviewed prior to creation of Plan. LABS:  I reviewed today's most current labs and imaging studies.   Pertinent labs include:  Recent Labs     11/09/20  0347 11/07/20  0314   WBC 8.9 8.6   HGB 8.5* 8.4*   HCT 26.9* 26.8*    245     Recent Labs     11/09/20  0748 11/09/20  0347 11/08/20  1300 11/08/20  0210 11/07/20  0314   *  --  134*  --  136   K 5.5*  --  5.4*  --  4.5   CL 98  --  100  --  101   CO2 27  --  26  --  28   GLU 80  --  83  --  78   BUN 36*  --  31*  --  21*   CREA 10.90*  --  9.56*  --  6.85*   CA 9.1  --  9.3  --  9.0   PHOS 5.2*  --  4.9*  --   --    ALB 3.0*  --  3.1*  --  2.9*   TBILI  --   --   --   --  0.4   ALT  --   --   --   --  12   INR  --  2.3*  --  1.9* 1.9*       Signed: Rich Ramirez MD

## 2020-11-10 ENCOUNTER — APPOINTMENT (OUTPATIENT)
Dept: MRI IMAGING | Age: 45
DRG: 314 | End: 2020-11-10
Attending: INTERNAL MEDICINE
Payer: MEDICARE

## 2020-11-10 VITALS
DIASTOLIC BLOOD PRESSURE: 72 MMHG | RESPIRATION RATE: 18 BRPM | HEIGHT: 67 IN | BODY MASS INDEX: 23.75 KG/M2 | SYSTOLIC BLOOD PRESSURE: 127 MMHG | WEIGHT: 151.3 LBS | HEART RATE: 92 BPM | TEMPERATURE: 99.4 F | OXYGEN SATURATION: 99 %

## 2020-11-10 LAB
ERYTHROCYTE [DISTWIDTH] IN BLOOD BY AUTOMATED COUNT: 16.9 % (ref 11.5–14.5)
HCT VFR BLD AUTO: 27.5 % (ref 36.6–50.3)
HGB BLD-MCNC: 8.6 G/DL (ref 12.1–17)
INR PPP: 2.7 (ref 0.9–1.1)
MCH RBC QN AUTO: 31.9 PG (ref 26–34)
MCHC RBC AUTO-ENTMCNC: 31.3 G/DL (ref 30–36.5)
MCV RBC AUTO: 101.9 FL (ref 80–99)
NRBC # BLD: 0 K/UL (ref 0–0.01)
NRBC BLD-RTO: 0 PER 100 WBC
PLATELET # BLD AUTO: 209 K/UL (ref 150–400)
PMV BLD AUTO: 10.5 FL (ref 8.9–12.9)
PROTHROMBIN TIME: 26.6 SEC (ref 9–11.1)
RBC # BLD AUTO: 2.7 M/UL (ref 4.1–5.7)
WBC # BLD AUTO: 8.4 K/UL (ref 4.1–11.1)

## 2020-11-10 PROCEDURE — 85610 PROTHROMBIN TIME: CPT

## 2020-11-10 PROCEDURE — 85027 COMPLETE CBC AUTOMATED: CPT

## 2020-11-10 PROCEDURE — 74011250637 HC RX REV CODE- 250/637: Performed by: INTERNAL MEDICINE

## 2020-11-10 PROCEDURE — 99232 SBSQ HOSP IP/OBS MODERATE 35: CPT | Performed by: INTERNAL MEDICINE

## 2020-11-10 PROCEDURE — 36415 COLL VENOUS BLD VENIPUNCTURE: CPT

## 2020-11-10 RX ORDER — CALCIUM ACETATE 667 MG/1
2 CAPSULE ORAL
Qty: 120 CAP | Refills: 0 | Status: SHIPPED | OUTPATIENT
Start: 2020-11-10

## 2020-11-10 RX ORDER — METOPROLOL SUCCINATE 25 MG/1
12.5 TABLET, EXTENDED RELEASE ORAL DAILY
Qty: 30 TAB | Refills: 0 | Status: ON HOLD | OUTPATIENT
Start: 2020-11-10 | End: 2021-12-28

## 2020-11-10 RX ORDER — HYDROCODONE BITARTRATE AND ACETAMINOPHEN 10; 325 MG/1; MG/1
1 TABLET ORAL
Qty: 4 TAB | Refills: 0 | Status: SHIPPED | OUTPATIENT
Start: 2020-11-10 | End: 2020-11-13

## 2020-11-10 RX ADMIN — CALCIUM ACETATE 1334 MG: 667 CAPSULE ORAL at 09:52

## 2020-11-10 RX ADMIN — WARFARIN SODIUM 2.5 MG: 2 TABLET ORAL at 18:12

## 2020-11-10 RX ADMIN — METOPROLOL SUCCINATE 12.5 MG: 25 TABLET, EXTENDED RELEASE ORAL at 09:52

## 2020-11-10 RX ADMIN — CALCITRIOL CAPSULES 0.25 MCG 0.5 MCG: 0.25 CAPSULE ORAL at 09:52

## 2020-11-10 RX ADMIN — CALCIUM ACETATE 1334 MG: 667 CAPSULE ORAL at 18:11

## 2020-11-10 RX ADMIN — HYDROCODONE BITARTRATE AND ACETAMINOPHEN 1 TABLET: 10; 325 TABLET ORAL at 09:55

## 2020-11-10 RX ADMIN — HYDROCODONE BITARTRATE AND ACETAMINOPHEN 1 TABLET: 10; 325 TABLET ORAL at 02:43

## 2020-11-10 RX ADMIN — PANTOPRAZOLE SODIUM 40 MG: 40 TABLET, DELAYED RELEASE ORAL at 06:38

## 2020-11-10 RX ADMIN — ASPIRIN 81 MG CHEWABLE TABLET 81 MG: 81 TABLET CHEWABLE at 09:52

## 2020-11-10 RX ADMIN — CALCIUM ACETATE 1334 MG: 667 CAPSULE ORAL at 12:23

## 2020-11-10 NOTE — PROGRESS NOTES
4421 PT waiting for MRI. Pt has AICD and a rep needs to be here. Rep came yesterday pt was in dialysis so MRI was not done. This writer called MRI, per Mehnaz Velázquez MRI will try to get a rep to come today, will call back and let writer know. 1110 MRI called back, has to speak with [employer] rep due to pt having 2 ACID. Per pt,  one in left chest put in 2009. One on Left side put in 2019. Chest had fractured lead, so turned it off and left in. MRI is still investigating. 1158 Per MRI, pt is not safe for MRI due to [facility] ICD pacemaker. Will not do MRI. [address]  Dr. Morelia Gaines put D/C  on hold due to not getting a chair for mon wed fri schedule at outside dialysis. per pt \" I do not want to stay here tonight. I will leave and go to dialysis on Thurs. I have missed a day of dialysis before. This writer notified Dr. Morelia Gaines,. Pt is discharging today. Dr Morelia Gaines tried talking pt into staying one more night, pt does not want to. Pt is leaving this evening. [phone] went over discharge instructions with pat. [employer] for questions and answers provided. Pt left  Via wheelchair to ED entrance with all discharge papers, information where to P/U medication and hard script for Norco and all personal belongings.

## 2020-11-10 NOTE — PROGRESS NOTES
Problem: Chronic Renal Failure  Goal: *Fluid and electrolytes stabilized  Outcome: Progressing Towards Goal     Problem: Patient Education: Go to Patient Education Activity  Goal: Patient/Family Education  Outcome: Progressing Towards Goal     Problem: Falls - Risk of  Goal: *Absence of Falls  Description: Document Andrea Crenshaw Fall Risk and appropriate interventions in the flowsheet.   Outcome: Progressing Towards Goal  Note: Fall Risk Interventions:            Medication Interventions: Patient to call before getting OOB         History of Falls Interventions: Bed/chair exit alarm         Problem: Patient Education: Go to Patient Education Activity  Goal: Patient/Family Education  Outcome: Progressing Towards Goal     Problem: Hypertension  Goal: *Blood pressure within specified parameters  Outcome: Progressing Towards Goal  Goal: *Fluid volume balance  Outcome: Progressing Towards Goal  Goal: *Labs within defined limits  Outcome: Progressing Towards Goal     Problem: Patient Education: Go to Patient Education Activity  Goal: Patient/Family Education  Outcome: Progressing Towards Goal     Problem: Arrhythmia Pathway (Adult)  Goal: *Absence of arrhythmia  Outcome: Progressing Towards Goal     Problem: Patient Education: Go to Patient Education Activity  Goal: Patient/Family Education  Outcome: Progressing Towards Goal

## 2020-11-10 NOTE — PROGRESS NOTES
Infectious Disease Progress Note    IMPRESSION:   · MRSE bacteremia secondary to presumed line infection  · Blood cultures positive 4 /4 on 10/20, patient has been on vancomycin IV since 10/24 at HD. · Repeat blood culturesNG 10/29  · Right femoral permacath removed 10/29, catheter tip culture NG  · End-stage renal disease on HD,s/p failed renal transplant x2  · AICD devices x 2 present, palpable no swelling or erythema or discharge suggestive of infection. · History of MSSA bacteremia secondary to infected femoral line , positive blood cultures 6/2017  · History of endocarditis per chart notes, patient denies history  · Echo-negative for vegetation on valve AICD  · Back pain still present, severity less since admission. CT lumbar spine irregular lucency L1/2 endplate, and fluid collection. OM/discitis not excluded. MRI was ordered on 11/5  cannot be done until next week hospitalist note. PLAN:      · If MRI is the hold up for patient's discharge,recommend patient be set up for outpatient MRI next week and discharged . ·  DC on vancomycin 750 mg IV with HD 3 times weekly, plan for 6 weeks end date 12/10, if MRI negative pt  will be changed to 2 weeks of vancomycin end date 11/12  · Vancomycin target trough 20-25. Pharmacy to adjust dose per  Vanco level  ·  Weekly CBC, CMP, Vanco trough & ESR/ CRP every other week- fax reports to [phone], call with critical labs at [phone]. ·  Patient encouraged to take a daily probiotic/yogurt  · ID clinic virtual follow-up on 11/19 at 9:30 AM.     Adverse effects of long term antibiotics are inclusive of but not limited to following  BM suppression, neutropenia , cytopenias , aplastic anemia hemorrhage liver & renal dysfunction/ liver , renal failure  , GI dysfunction- N, V  Diarrhea,C.difficile disease, rash , allergy , anaphylaxis. toxic epidermal necrolysis  Neuro toxicity , seizure disorder  Side effects tend to be more pronounced in the elderly        Subjective:     Patient seen . Sitting up in chair  Denies complaints. Back pain still present but less than before. AV graft LUE +- no issues, D/W dialysis RN  Right femoral siteno swelling, discharge. Review of Systems:  A comprehensive review of systems was negative except for that written in the History of Present Illness. 10 point ROS obtained . All other systems negative . Objective:     Blood pressure 133/84, pulse 95, temperature 97.6 °F (36.4 °C), resp. rate 18, height 5' 7\" (1.702 m), weight 151 lb 4.8 oz (68.6 kg), SpO2 95 %. Temp (24hrs), Av.7 °F (37.1 °C), Min:97.6 °F (36.4 °C), Max:99.5 °F (37.5 °C)      Patient Vitals for the past 24 hrs:   Temp Pulse Resp BP SpO2   11/10/20 1123 97.6 °F (36.4 °C) 95 18 133/84 95 %   11/10/20 0743 98.3 °F (36.8 °C) 92 18 119/73 97 %   11/10/20 0419 98.8 °F (37.1 °C) 94 18 114/66 97 %   11/10/20 0029 98.5 °F (36.9 °C) 88 18 113/63 100 %   20 2042 99.5 °F (37.5 °C) 98 18 (!) 112/56 98 %   20 1511 99.3 °F (37.4 °C) 95 18 (!) 114/56 100 %         Lines:  Peripheral IV: AVG    Physical Exam:   General:  Awake, cooperative,   Eyes:  Sclera anicteric. Pupils equally round and reactive to light. Mouth/Throat: Mucous membranes normal, oral pharynx clear   Neck: Supple   Lungs:   reduced auscultation bases   CV:  Regular rate and rhythm,no murmur, click, rub or gallop   Abdomen:   Soft, non-tender.  bowel sounds normal. non-distended   Extremities: No  edema   Skin: Skin color, texture, turgor normal. no acute rash or lesions   Lymph nodes: Cervical and supraclavicular normal   Musculoskeletal: No swelling or deformity, r/femoral site- no swelling   Lines/Devices:  Intact, no erythema, drainage or tenderness   Psych: Alert and oriented, normal mood affect       Data Reviewed:    Studies:      Lab Results   Component Value Date/Time    Culture result: NO GROWTH 2 DAYS 10/29/2020 06:13 PM    Culture result: NO GROWTH 5 DAYS 10/29/2020 11:50 AM    Culture result: (A) 10/20/2020 11:19 PM     STAPHYLOCOCCUS EPIDERMIDIS (OXACILLIN RESISTANT) GROWING IN ALL 4 BOTTLES DRAWN (SITE = RAC 1 AND R WIRST)    Culture result: MRSA NOT PRESENT 09/08/2020 06:42 AM    Culture result:  09/08/2020 06:42 AM         Screening of patient nares for MRSA is for surveillance purposes and, if positive, to facilitate isolation considerations in high risk settings. It is not intended for automatic decolonization interventions per se as regimens are not sufficiently effective to warrant routine use. XR Results (most recent):  Results from Hospital Encounter encounter on 10/20/20   XR CHEST PORT    Narrative INDICATION: SOB    EXAM:  AP CHEST RADIOGRAPH    COMPARISON: September 10, 2020    FINDINGS:    AP portable view of the chest demonstrates mild cardiomegaly with unchanged  pacemakers. There is no edema, effusion, consolidation, or pneumothorax. The  osseous structures are unremarkable. Impression IMPRESSION:  No acute process.        Patient Active Problem List   Diagnosis Code    Kidney transplant     Coronary atherosclerosis of native coronary artery I25.10    Nausea & vomiting R11.2    Serum total bilirubin elevated R17    Abdominal pain R10.9    HTN (hypertension) I10    Anemia D64.9    S/P appendectomy Z90.49    High cholesterol E78.00    Other ill-defined conditions(799.89) R69    CAD (coronary artery disease) I25.10    Gastrointestinal disorder K92.9    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[facility]) D69.6    Peritonitis ([facility] K65.9    Malnutrition ([facility] E46    DVT (deep venous thrombosis) ([facility]) I82.409    S/P IVC filter Z95.828    Arterial occlusion I70.90    S/p cadaver renal transplant Z94.0    AICD (automatic cardioverter/defibrillator) present Z95.810    ESRD (end stage renal disease) on dialysis ([facility]) N18.6, Z99.2    Dialysis patient ([facility] Z99.2    Hyperkalemia E87.5    Congestive heart failure, unspecified I50.9    Hypoglycemia E16.2    Sepsis ([facility] A41.9    Pneumonia J18.9    Abnormal EKG R94.31    ESRD (end stage renal disease) ([facility] N18.6         ICD-10-CM ICD-9-CM    1. Acute hyperkalemia  E87.5 276.7    2. Bacteremia  R78.81 790.7    3. Cystitis  N30.90 595.9    4. AICD (automatic cardioverter/defibrillator) present  Z95.810 V45.02    5. Bacteremia due to methicillin resistant Staphylococcus epidermidis  R78.81 790.7     B95.7 041.19     Z16.29     6. Line sepsis associated with dialysis catheter ([facility]  T82. 7XXA 996.62     A41.9 038.9    7. ESRD (end stage renal disease) ([facility]  N18.6 585.6    8. Essential hypertension  I10 401.9    9. Kidney transplant   V42.0    10. Staphylococcus aureus bacteremia  R78.81 790.7     B95.61 041.11    11. Coronary artery disease involving native heart, angina presence unspecified, unspecified vessel or lesion type  I25.10 414.01    12. Dialysis patient ([facility]  Z99.2 V45.11    13. ESRD (end stage renal disease) on dialysis ([facility]  N18.6 585.6     Z99.2 V45.11    14. High cholesterol  E78.00 272.0    15. Acute midline low back pain without sciatica  M54.5 724.2    16. Abnormal CT scan, lumbar spine  R93.7 793.7        I have discussed the diagnosis with the patient and the intended plan as seen in the above orders. I have discussed medication side effects and warnings with the patient as well.     Reviewed test results at length with patient    Anti-infectives:      Vancomycin IV     Cristino Trinidad

## 2020-11-10 NOTE — PROGRESS NOTES
Nephrology Progress Note  Blaise Leblanc     [email or web address]  Phone - [phone]   Patient: Renetta Bray    YOB: 1975     Admit Date: 10/29/2020   Date- 11/10/2020     CC: Follow up for esrd        IMPRESSION & PLAN:   · esrd- home hd- F/B [facility] nephrologist- HE HAS BEEN ON MWF HD AT UNIT - as he has been on vanco prior to admission  · Hyperkalemia  · Sepsis - staph epi. S/p femoral hd cath removal  · Anemia of ckd  · Sec. hyperpara  · Hx of renal tx inpast.  · Hypertension  · CAD  · H/o DVT, s/p ivc filter/ h/o HIT  ·     PLAN-   No hd today   Continue hd mwf schedule   Check labs with hd tomorrow   He can get vanco with hd as out pt   Continue phoslol   Continue epogen   Continue calcitriol   Plan for MRI noted     Subjective: Interval History:   -bp stable--no labs done today  S/p hd yesterday  No C/O of chest pain,SOB, vomiting, abdominal pain,fever,chills      ROS:- As documented above  Objective:   Vitals:    11/09/20 2042 11/10/20 0029 11/10/20 0419 11/10/20 0743   BP: (!) 112/56 113/63 114/66 119/73   Pulse: 98 88 94 92   Resp: 18 18 18 18   Temp: 99.5 °F (37.5 °C) 98.5 °F (36.9 °C) 98.8 °F (37.1 °C) 98.3 °F (36.8 °C)   TempSrc:       SpO2: 98% 100% 97% 97%   Weight:       Height:          11/09 0701 - 11/10 0700  In: 850 [P.O.:600; I.V.:250]  Out: 2500   Last 3 Recorded Weights in this Encounter    11/06/20 0358 11/07/20 0433 11/08/20 0151   Weight: 68.7 kg (151 lb 7.3 oz) 67.3 kg (148 lb 5.9 oz) 68.6 kg (151 lb 4.8 oz)      Physical exam:   GEN:  NAD  NECK:  Supple, no thyromegaly  RESP: CTA  b/l, no  wheezing,   CVS: RRR,S1,S2   ABDO:  soft , non tender, No mass  NEURO: non focal, normal speech   EXT: left arm avg +      Chart reviewed. Pertinent Notes reviewed.      Data Review :  Recent Labs     11/09/20  0748 11/08/20  1300   * 134*   K 5.5* 5.4*   CL 98 100   CO2 27 26   BUN 36* 31*   CREA 10.90* 9.56*   GLU 80 83   CA 9.1 9.3   PHOS 5. 2* 4.9*     Recent Labs     11/10/20  0251 11/09/20  0347   WBC 8.4 8.9   HGB 8.6* 8.5*   HCT 27.5* 26.9*    225     No results for input(s): FE, TIBC, PSAT, FERR in the last 72 hours.    Medication list  reviewed  Current Facility-Administered Medications   Medication Dose Route Frequency    hydrALAZINE (APRESOLINE) 20 mg/mL injection 10 mg  10 mg IntraVENous Q6H PRN    WARFARIN INFORMATION NOTE (COUMADIN)   Other QPM    calcium acetate(phosphat bind) (PHOSLO) capsule 1,334 mg  2 Cap Oral TID WITH MEALS    epoetin emilie-epbx (RETACRIT) injection 10,000 Units  10,000 Units SubCUTAneous Q TUE, THU & SAT    acetaminophen (TYLENOL) tablet 500 mg  500 mg Oral Q4H PRN    aspirin chewable tablet 81 mg  81 mg Oral DAILY    calcitRIOL (ROCALTROL) capsule 0.5 mcg  0.5 mcg Oral DAILY    metoprolol succinate (TOPROL-XL) XL tablet 12.5 mg  12.5 mg Oral DAILY    pantoprazole (PROTONIX) tablet 40 mg  40 mg Oral ACB    ondansetron (ZOFRAN) injection 4 mg  4 mg IntraVENous Q6H PRN    morphine injection 1-2 mg  1-2 mg IntraVENous Q4H PRN    Vancomycin - Pharmacy to Dose   Other Rx Dosing/Monitoring    traMADoL (ULTRAM) tablet 50 mg  50 mg Oral Q6H PRN    HYDROcodone-acetaminophen (NORCO)  mg tablet 1 Tab  1 Tab Oral Q6H PRN          Conner Yu MD              [facility]  [facility] / [facility], [address]  [place], [address]  Phone - [phone]               Fax - [phone]

## 2020-11-10 NOTE — PROGRESS NOTES
General Surgery End of Shift Nursing Note    Bedside shift change report given to Reynold Whitmore (oncoming nurse) by Rocky Carlin (offgoing nurse). Report included the following information SBAR, Kardex, Intake/Output, MAR and Recent Results. Shift worked:   4517-2040   Summary of shift:    Uneventful shift. Medicated for pain as needed. MRI planned for today. Issues for physician to address:  none     Number times ambulated in hallway past shift: 0    Number of times OOB to chair past shift: 0    Pain Management:  Current medication: see MAR  Patient states pain is manageable on current pain medication: YES    GI:    Current diet:  DIET RENAL Regular    Tolerating current diet: YES  Passing flatus: YES  Last Bowel Movement: several days ago     Patient Safety:    Falls Score: 1  Bed Alarm On? No  Sitter?  No    Maddison Leung RN

## 2020-11-10 NOTE — PROGRESS NOTES
Pharmacy Daily Dosing of Warfarin    Indication: H/o DVT (s/p IVC)  Goal INR: 2-3    PTA Warfarin Dose: 2.5 mg PO daily    Concurrent anticoagulants: None  Concurrent antiplatelet: Aspirin 81 mg daily    Major Interacting Medications   Drugs that may increase INR: None  Drugs that may decrease INR: None    Conditions that may increase/decrease INR (CHF, C. diff, cirrhosis, thyroid disorder, hypoalbuminemia): None    Labs:  Recent Labs     11/10/20  0251 11/09/20  0748 11/09/20  0347  11/08/20  0210   INR 2.7*  --  2.3*  --  1.9*   HGB 8.6*  --  8.5*   < >  --      --  225  --   --    ALB  --  3.0*  --    < >  --     < > = values in this interval not displayed. Impression/Plan:   INR therapeutic after several \"booster\" doses, resume 2.5 mg daily as taking PTA  Daily INR  CBC w/o diff every other day      Pharmacy will follow daily and adjust the dose as appropriate. [email or web address]://[facility]/[place]/[facility]/Pharmacy/Clinical%20Companion/Warfarin%20Dosing%20Protocol. pdf                      Pharmacy Daily Dosing of Warfarin    Indication: H/o DVT (s/p IVC)  Goal INR: 2-3    PTA Warfarin Dose: 2.5 mg PO daily    Concurrent anticoagulants: None  Concurrent antiplatelet: Aspirin 81 mg daily    Major Interacting Medications   Drugs that may increase INR: None  Drugs that may decrease INR: None    Conditions that may increase/decrease INR (CHF, C. diff, cirrhosis, thyroid disorder, hypoalbuminemia): None    Labs:  Recent Labs     11/10/20  0251 11/09/20  0748 11/09/20  0347  11/08/20  0210   INR 2.7*  --  2.3*  --  1.9*   HGB 8.6*  --  8.5*   < >  --      --  225  --   --    ALB  --  3.0*  --    < >  --     < > = values in this interval not displayed.        Impression/Plan:   INR therapeutic, increasing after several \"booster\" doses, resume 2.5 mg daily as taking PTA  ADD over past 7 days = ~3.5 mg  Daily INR  CBC w/o diff every other day      Pharmacy will follow daily and adjust the dose as appropriate. [email or web address]/[place]/[facility]/Pharmacy/Clinical%20Companion/Warfarin%20Dosing%20Protocol. pdf

## 2020-11-10 NOTE — PROGRESS NOTES
[facility]:                 *Home w/family              *sister to transport at d/c    [facility]([phone]) antibiotic order, d/c summary and labs to [facility]. [facility] informed CM they only have a Tues-Thurs-Sat chair time available, to begin on Thursday 11/12 at 1pm.  CM notified MD & pt. CM will continue to follow.     Casi Macario  Ext [phone]

## 2020-11-10 NOTE — DISCHARGE SUMMARY
Hospitalist Discharge Summary     Patient ID:  Robi Bonilla  [id number]  44 y.o.  1975  10/29/2020    PCP on record: Alejandra Villa MD    Admit date: 10/29/2020  Discharge date and time: 11/10/2020    DISCHARGE DIAGNOSIS:    Sepsis POA, resolved due to MRSE bacteremia POA   Hx of endocarditis    CONSULTATIONS:  IP CONSULT TO NEPHROLOGY  IP CONSULT TO INFECTIOUS DISEASES    Excerpted HPI from H&P of Donell Castle, DO:  Dominic Herrera is a 39 y.o.  male who presents with above for a day. He also did not complete dialysis due to clot in dialysis unit. He presents to ER for fever/back pain/incomplete dialysis. In ER, his potassium was elevated, and right femoral line was removed.      Pt was in ER last week with fever/chills/myalgias with blood cultures eventually growing staph epidermis, sensitive to vanco. Pt had a right femoral cath placed in sept 2020 for home dialysis on tues and thurs. Since this was no longer needed he was asked to return to hospital to have it removed, however, he failed to do so.       Currently back pain is dull achey, does not radiate, nothing makes it worse or better, and progressively became worse over the day. Pt felt feverish yesterday one time. ______________________________________________________________________  DISCHARGE SUMMARY/HOSPITAL COURSE:  for full details see H&P, daily progress notes, labs, consult notes. Sepsis POA, resolved due to MRSE bacteremia POA   Hx of endocarditis  - Positive blood cultures on 10/20/2020.    - Started on vancomycin at dialysis center on 10/24.    - Right femoral permacath, removed 10/29/2020.    - Repeat blood cultures from 10/29/2020 no growth to date.    - Tip from R femoral HD cath NGTD.    - CT L spine showed irregular lucency around L1-2 endplate.  - TTE reveals EF 25-30%m no vegetation noted.    - Continue Vancomycin - THIS REQUIRES MONITORING OF DRUG LEVELS  - ID recommending MRI lumbar spine but the ICD company reported that this not safe to the MRI here as per patient's nurse    Recommendations per ID:  · \"If MRI is the hold up for patient's discharge,recommend patient be set up for outpatient MRI next week and discharged . ·  DC on vancomycin 750 mg IV with HD 3 times weekly, plan for 6 weeks end date 12/10, if MRI negative pt  will be changed to 2 weeks of vancomycin end date 11/12  · Vancomycin target trough 20-25. Pharmacy to adjust dose per  Vanco level  ·  Weekly CBC, CMP, Vanco trough & ESR/ CRP every other week- fax reports to [phone], call with critical labs at [phone]. ·  Patient encouraged to take a daily probiotic/yogurt  · ID clinic virtual follow-up on 11/19 at 9:30 AM\".      _______________________________________________________________________  Patient seen and examined by me on discharge day. Pertinent Findings:  Gen:    Not in distress  Chest: Clear lungs  CVS:   Regular rhythm. No edema  Abd:  Soft, not distended, not tender  Neuro:  Alert,   _______________________________________________________________________  DISCHARGE MEDICATIONS:   Current Discharge Medication List      START taking these medications    Details   HYDROcodone-acetaminophen (NORCO)  mg tablet Take 1 Tab by mouth every eight (8) hours as needed for Pain for up to 3 days. Max Daily Amount: 3 Tabs. Qty: 4 Tab, Refills: 0    Associated Diagnoses: Discitis, unspecified spinal region      calcium acetate,phosphat bind, (PHOSLO) 667 mg cap Take 2 Caps by mouth three (3) times daily (with meals). Indications: low amount of calcium in the blood, renal osteodystrophy with hyperphosphatemia  Qty: 120 Cap, Refills: 0         CONTINUE these medications which have CHANGED    Details   metoprolol succinate (TOPROL-XL) 25 mg XL tablet Take 0.5 Tabs by mouth daily.   Qty: 30 Tab, Refills: 0         CONTINUE these medications which have NOT CHANGED    Details   atorvastatin (Lipitor) 20 mg tablet Take 20 mg by mouth daily. warfarin (COUMADIN) 2.5 mg tablet Take 2.5 mg by mouth daily. acetaminophen (TYLENOL) 500 mg tablet Take 1 Tab by mouth every four (4) hours as needed for Pain. Over the counter  Qty: 30 Tab, Refills: 0      omeprazole (PRILOSEC) 20 mg capsule Take 20 mg by mouth daily. calcitRIOL (ROCALTROL) 0.5 mcg capsule Take 0.5 mcg by mouth daily. aspirin 81 mg chewable tablet Take 81 mg by mouth daily. STOP taking these medications       cinacalcet 60 mg tab Comments:   Reason for Stopping:         sevelamer carbonate (RENVELA) 800 mg tab tab Comments:   Reason for Stopping:                 Patient Follow Up Instructions:    Activity: Activity as tolerated  Diet: Renal Diet  Wound Care: None needed        Follow-up Information     Follow up With Specialties Details Why Contact Joss Yee MD Family Medicine  Office will call patient to [facility]  [phone]          ________________________________________________________________    Risk of deterioration: Low    Condition at Discharge:  Stable  __________________________________________________________________    Disposition  Home with family, no needs    ____________________________________________________________________    Code Status: Full Code  ___________________________________________________________________      Total time in minutes spent coordinating this discharge (includes going over instructions, follow-up, prescriptions, and preparing report for sign off to her PCP) :  >30 minutes    Signed:  Arthur Hanson MD

## 2020-11-10 NOTE — PROGRESS NOTES
Patient has a [employer] ICD and a [employer] pacemaker; his ICD is NOT MRI SAFE. Please cancel the MRI ASAP.  Thank you, mike

## 2021-03-27 ENCOUNTER — APPOINTMENT (OUTPATIENT)
Dept: GENERAL RADIOLOGY | Age: 46
DRG: 252 | End: 2021-03-27
Attending: EMERGENCY MEDICINE
Payer: MEDICARE

## 2021-03-27 ENCOUNTER — HOSPITAL ENCOUNTER (EMERGENCY)
Age: 46
Discharge: HOME | DRG: 252 | End: 2021-03-27
Attending: EMERGENCY MEDICINE | Admitting: EMERGENCY MEDICINE
Payer: MEDICARE

## 2021-03-27 VITALS
BODY MASS INDEX: 23.32 KG/M2 | DIASTOLIC BLOOD PRESSURE: 87 MMHG | TEMPERATURE: 98.5 F | HEART RATE: 92 BPM | SYSTOLIC BLOOD PRESSURE: 123 MMHG | WEIGHT: 148.59 LBS | OXYGEN SATURATION: 100 % | RESPIRATION RATE: 19 BRPM | HEIGHT: 67 IN

## 2021-03-27 DIAGNOSIS — Z99.2 ESRD ON DIALYSIS (HCC): ICD-10-CM

## 2021-03-27 DIAGNOSIS — R11.0 NAUSEA WITHOUT VOMITING: Primary | ICD-10-CM

## 2021-03-27 DIAGNOSIS — E87.1 ACUTE HYPONATREMIA: ICD-10-CM

## 2021-03-27 DIAGNOSIS — N18.6 ESRD ON DIALYSIS (HCC): ICD-10-CM

## 2021-03-27 DIAGNOSIS — D72.829 LEUKOCYTOSIS, UNSPECIFIED TYPE: ICD-10-CM

## 2021-03-27 DIAGNOSIS — Z20.822 SUSPECTED COVID-19 VIRUS INFECTION: ICD-10-CM

## 2021-03-27 LAB
ALBUMIN SERPL-MCNC: 3.2 G/DL (ref 3.5–5)
ALBUMIN/GLOB SERPL: 0.7 {RATIO} (ref 1.1–2.2)
ALP SERPL-CCNC: 127 U/L (ref 45–117)
ALT SERPL-CCNC: 13 U/L (ref 12–78)
ANION GAP SERPL CALC-SCNC: 17 MMOL/L (ref 5–15)
APTT PPP: 41.8 SEC (ref 22.1–31)
AST SERPL-CCNC: 10 U/L (ref 15–37)
ATRIAL RATE: 98 BPM
BASOPHILS # BLD: 0.1 K/UL (ref 0–0.1)
BASOPHILS NFR BLD: 1 % (ref 0–1)
BILIRUB SERPL-MCNC: 0.8 MG/DL (ref 0.2–1)
BUN SERPL-MCNC: 84 MG/DL (ref 6–20)
BUN/CREAT SERPL: 8 (ref 12–20)
CALCIUM SERPL-MCNC: 8.3 MG/DL (ref 8.5–10.1)
CALCULATED P AXIS, ECG09: 63 DEGREES
CALCULATED R AXIS, ECG10: 59 DEGREES
CALCULATED T AXIS, ECG11: -74 DEGREES
CHLORIDE SERPL-SCNC: 91 MMOL/L (ref 97–108)
CO2 SERPL-SCNC: 20 MMOL/L (ref 21–32)
CREAT SERPL-MCNC: 11.2 MG/DL (ref 0.7–1.3)
DIAGNOSIS, 93000: NORMAL
DIFFERENTIAL METHOD BLD: ABNORMAL
EOSINOPHIL # BLD: 0.4 K/UL (ref 0–0.4)
EOSINOPHIL NFR BLD: 3 % (ref 0–7)
ERYTHROCYTE [DISTWIDTH] IN BLOOD BY AUTOMATED COUNT: 16.6 % (ref 11.5–14.5)
GLOBULIN SER CALC-MCNC: 4.7 G/DL (ref 2–4)
GLUCOSE SERPL-MCNC: 95 MG/DL (ref 65–100)
HCT VFR BLD AUTO: 23.5 % (ref 36.6–50.3)
HGB BLD-MCNC: 8 G/DL (ref 12.1–17)
IMM GRANULOCYTES # BLD AUTO: 0.1 K/UL (ref 0–0.04)
IMM GRANULOCYTES NFR BLD AUTO: 1 % (ref 0–0.5)
INR PPP: 2.6 (ref 0.9–1.1)
LACTATE BLD-SCNC: 0.84 MMOL/L (ref 0.4–2)
LYMPHOCYTES # BLD: 1 K/UL (ref 0.8–3.5)
LYMPHOCYTES NFR BLD: 8 % (ref 12–49)
MCH RBC QN AUTO: 31.7 PG (ref 26–34)
MCHC RBC AUTO-ENTMCNC: 34 G/DL (ref 30–36.5)
MCV RBC AUTO: 93.3 FL (ref 80–99)
MONOCYTES # BLD: 1.5 K/UL (ref 0–1)
MONOCYTES NFR BLD: 12 % (ref 5–13)
NEUTS SEG # BLD: 9.7 K/UL (ref 1.8–8)
NEUTS SEG NFR BLD: 75 % (ref 32–75)
NRBC # BLD: 0 K/UL (ref 0–0.01)
NRBC BLD-RTO: 0 PER 100 WBC
P-R INTERVAL, ECG05: 164 MS
PLATELET # BLD AUTO: 256 K/UL (ref 150–400)
PMV BLD AUTO: 10.9 FL (ref 8.9–12.9)
POTASSIUM SERPL-SCNC: 4.6 MMOL/L (ref 3.5–5.1)
PROT SERPL-MCNC: 7.9 G/DL (ref 6.4–8.2)
PROTHROMBIN TIME: 25.6 SEC (ref 9–11.1)
Q-T INTERVAL, ECG07: 384 MS
QRS DURATION, ECG06: 104 MS
QTC CALCULATION (BEZET), ECG08: 490 MS
RBC # BLD AUTO: 2.52 M/UL (ref 4.1–5.7)
SARS-COV-2, COV2: NORMAL
SARS-COV-2, COV2: NOT DETECTED
SODIUM SERPL-SCNC: 128 MMOL/L (ref 136–145)
SPECIMEN SOURCE, FCOV2M: NORMAL
THERAPEUTIC RANGE,PTTT: ABNORMAL SECS (ref 58–77)
TROPONIN I SERPL-MCNC: <0.05 NG/ML
TROPONIN I SERPL-MCNC: <0.05 NG/ML
VENTRICULAR RATE, ECG03: 98 BPM
WBC # BLD AUTO: 12.7 K/UL (ref 4.1–11.1)

## 2021-03-27 PROCEDURE — 99285 EMERGENCY DEPT VISIT HI MDM: CPT

## 2021-03-27 PROCEDURE — 96374 THER/PROPH/DIAG INJ IV PUSH: CPT

## 2021-03-27 PROCEDURE — 83605 ASSAY OF LACTIC ACID: CPT

## 2021-03-27 PROCEDURE — 85730 THROMBOPLASTIN TIME PARTIAL: CPT

## 2021-03-27 PROCEDURE — 80053 COMPREHEN METABOLIC PANEL: CPT

## 2021-03-27 PROCEDURE — 85025 COMPLETE CBC W/AUTO DIFF WBC: CPT

## 2021-03-27 PROCEDURE — 71045 X-RAY EXAM CHEST 1 VIEW: CPT

## 2021-03-27 PROCEDURE — 74011250636 HC RX REV CODE- 250/636: Performed by: EMERGENCY MEDICINE

## 2021-03-27 PROCEDURE — 84484 ASSAY OF TROPONIN QUANT: CPT

## 2021-03-27 PROCEDURE — 87040 BLOOD CULTURE FOR BACTERIA: CPT

## 2021-03-27 PROCEDURE — U0003 INFECTIOUS AGENT DETECTION BY NUCLEIC ACID (DNA OR RNA); SEVERE ACUTE RESPIRATORY SYNDROME CORONAVIRUS 2 (SARS-COV-2) (CORONAVIRUS DISEASE [COVID-19]), AMPLIFIED PROBE TECHNIQUE, MAKING USE OF HIGH THROUGHPUT TECHNOLOGIES AS DESCRIBED BY CMS-2020-01-R: HCPCS

## 2021-03-27 PROCEDURE — 36415 COLL VENOUS BLD VENIPUNCTURE: CPT

## 2021-03-27 PROCEDURE — 87077 CULTURE AEROBIC IDENTIFY: CPT

## 2021-03-27 PROCEDURE — 85610 PROTHROMBIN TIME: CPT

## 2021-03-27 PROCEDURE — 93005 ELECTROCARDIOGRAM TRACING: CPT

## 2021-03-27 PROCEDURE — 96375 TX/PRO/DX INJ NEW DRUG ADDON: CPT

## 2021-03-27 PROCEDURE — 87186 SC STD MICRODIL/AGAR DIL: CPT

## 2021-03-27 RX ORDER — FENTANYL CITRATE 50 UG/ML
50 INJECTION, SOLUTION INTRAMUSCULAR; INTRAVENOUS
Status: COMPLETED | OUTPATIENT
Start: 2021-03-27 | End: 2021-03-27

## 2021-03-27 RX ORDER — ONDANSETRON 2 MG/ML
4 INJECTION INTRAMUSCULAR; INTRAVENOUS
Status: COMPLETED | OUTPATIENT
Start: 2021-03-27 | End: 2021-03-27

## 2021-03-27 RX ORDER — ONDANSETRON 4 MG/1
4 TABLET, ORALLY DISINTEGRATING ORAL
Qty: 20 TAB | Refills: 0 | Status: SHIPPED | OUTPATIENT
Start: 2021-03-27 | End: 2022-04-06

## 2021-03-27 RX ORDER — OXYCODONE AND ACETAMINOPHEN 5; 325 MG/1; MG/1
1 TABLET ORAL
Qty: 10 TAB | Refills: 0 | Status: SHIPPED | OUTPATIENT
Start: 2021-03-27 | End: 2021-03-30

## 2021-03-27 RX ADMIN — ONDANSETRON 4 MG: 2 INJECTION INTRAMUSCULAR; INTRAVENOUS at 03:40

## 2021-03-27 RX ADMIN — FENTANYL CITRATE 50 MCG: 50 INJECTION, SOLUTION INTRAMUSCULAR; INTRAVENOUS at 03:39

## 2021-03-27 NOTE — DISCHARGE INSTRUCTIONS
Thank you for allowing us to take care of you today! In this packet, I have included a copy of all your lab results and/or radiologic studies so you can have them easily available at your follow-up visit. We hope we addressed all of your concerns and needs. We strive to provide excellent quality care in the Emergency Department. You will receive a survey after your visit to evaluate the care you were provided. It was a pleasure serving you. Should you receive a survey from us, we invite you to share your experience with us. The exam and treatment you received in the Emergency Department were for an urgent problem and are not intended as complete care. It is important that you follow up with a doctor, nurse practitioner, or physician assistant for ongoing care. If your symptoms become worse or you do not improve as expected and you are unable to reach your usual health care provider, you should return to the Emergency Department. We are available 24 hours a day. Please take your discharge instructions with you when you go to your follow-up appointment. If you have any problem arranging a follow-up appointment, contact the Emergency Department immediately. If a prescription has been provided, please have it filled as soon as possible to prevent a delay in treatment. Read the entire medication instruction sheet provided to you by the pharmacy. If you have any questions or reservations about taking the medication due to side effects or interactions with other medications, please call your primary care physician or contact the ER to speak with the charge nurse. Make an appointment with your family doctor or the physician you were referred to for follow-up of this visit as instructed on your discharge paperwork. Return to the ER if you are unable to be seen or if you are unable to be seen in a timely manner.     If you have any problem arranging the follow-up visit, contact the Emergency Department immediately. I hope you feel better and thank you again for allowing us to provide you with excellent care today at [facility]! Warmest regards,    Ritesh Palmer MD  Emergency Medicine Physician  [facility]      ______________________________________________________________________________________________    Vitals:    03/27/21 0500 03/27/21 0530 03/27/21 0545 03/27/21 0600   BP: 114/89 133/89 (!) 130/94 (!) 128/93   BP 1 Location:       BP Patient Position:       Pulse: 95 98 97 96   Resp: 20 21 21 21   Temp:       SpO2: 100% 100% 100% 100%   Weight:       Height:           Recent Results (from the past 12 hour(s))   EKG, 12 LEAD, INITIAL    Collection Time: 03/27/21  3:25 AM   Result Value Ref Range    Ventricular Rate 98 BPM    Atrial Rate 98 BPM    P-R Interval 164 ms    QRS Duration 104 ms    Q-T Interval 384 ms    QTC Calculation (Bezet) 490 ms    Calculated P Axis 63 degrees    Calculated R Axis 59 degrees    Calculated T Axis -74 degrees    Diagnosis       Sinus rhythm with occasional premature ventricular complexes  T wave abnormality, consider inferior ischemia  Prolonged QT  When compared with ECG of 29-OCT-2020 14:03,  premature ventricular complexes are now present     CBC WITH AUTOMATED DIFF    Collection Time: 03/27/21  3:31 AM   Result Value Ref Range    WBC 12.7 (H) 4.1 - 11.1 K/uL    RBC 2.52 (L) 4.10 - 5.70 M/uL    HGB 8.0 (L) 12.1 - 17.0 g/dL    HCT 23.5 (L) 36.6 - 50.3 %    MCV 93.3 80.0 - 99.0 FL    MCH 31.7 26.0 - 34.0 PG    MCHC 34.0 30.0 - 36.5 g/dL    RDW 16.6 (H) 11.5 - 14.5 %    PLATELET 614 265 - 122 K/uL    MPV 10.9 8.9 - 12.9 FL    NRBC 0.0 0  WBC    ABSOLUTE NRBC 0.00 0.00 - 0.01 K/uL    NEUTROPHILS 75 32 - 75 %    LYMPHOCYTES 8 (L) 12 - 49 %    MONOCYTES 12 5 - 13 %    EOSINOPHILS 3 0 - 7 %    BASOPHILS 1 0 - 1 %    IMMATURE GRANULOCYTES 1 (H) 0.0 - 0.5 %    ABS.  NEUTROPHILS 9.7 (H) 1.8 - 8.0 K/UL    ABS. LYMPHOCYTES 1.0 0.8 - 3.5 K/UL    ABS. MONOCYTES 1.5 (H) 0.0 - 1.0 K/UL    ABS. EOSINOPHILS 0.4 0.0 - 0.4 K/UL    ABS. BASOPHILS 0.1 0.0 - 0.1 K/UL    ABS. IMM. GRANS. 0.1 (H) 0.00 - 0.04 K/UL    DF AUTOMATED     METABOLIC PANEL, COMPREHENSIVE    Collection Time: 03/27/21  3:31 AM   Result Value Ref Range    Sodium 128 (L) 136 - 145 mmol/L    Potassium 4.6 3.5 - 5.1 mmol/L    Chloride 91 (L) 97 - 108 mmol/L    CO2 20 (L) 21 - 32 mmol/L    Anion gap 17 (H) 5 - 15 mmol/L    Glucose 95 65 - 100 mg/dL    BUN 84 (H) 6 - 20 MG/DL    Creatinine 11.20 (H) 0.70 - 1.30 MG/DL    BUN/Creatinine ratio 8 (L) 12 - 20      GFR est AA 6 (L) >60 ml/min/1.73m2    GFR est non-AA 5 (L) >60 ml/min/1.73m2    Calcium 8.3 (L) 8.5 - 10.1 MG/DL    Bilirubin, total 0.8 0.2 - 1.0 MG/DL    ALT (SGPT) 13 12 - 78 U/L    AST (SGOT) 10 (L) 15 - 37 U/L    Alk. phosphatase 127 (H) 45 - 117 U/L    Protein, total 7.9 6.4 - 8.2 g/dL    Albumin 3.2 (L) 3.5 - 5.0 g/dL    Globulin 4.7 (H) 2.0 - 4.0 g/dL    A-G Ratio 0.7 (L) 1.1 - 2.2     PTT    Collection Time: 03/27/21  3:31 AM   Result Value Ref Range    aPTT 41.8 (H) 22.1 - 31.0 sec    aPTT, therapeutic range     58.0 - 77.0 SECS   PROTHROMBIN TIME + INR    Collection Time: 03/27/21  3:31 AM   Result Value Ref Range    INR 2.6 (H) 0.9 - 1.1      Prothrombin time 25.6 (H) 9.0 - 11.1 sec   TROPONIN I    Collection Time: 03/27/21  3:31 AM   Result Value Ref Range    Troponin-I, Qt. <0.05 <0.05 ng/mL   POC LACTIC ACID    Collection Time: 03/27/21  3:33 AM   Result Value Ref Range    Lactic Acid (POC) 0.84 0.40 - 2.00 mmol/L   TROPONIN I    Collection Time: 03/27/21  5:37 AM   Result Value Ref Range    Troponin-I, Qt. <0.05 <0.05 ng/mL       XR CHEST PORT   Final Result   No acute process.             CT Results  (Last 48 hours)      None

## 2021-03-27 NOTE — ED PROVIDER NOTES
EMERGENCY DEPARTMENT HISTORY AND PHYSICAL EXAM      Please note that this dictation was completed with the assistance of \"Dragon\", the computer voice recognition software. Quite often unanticipated grammatical, syntax, homophones, and other interpretive errors are inadvertently transcribed by the computer software. Please disregard these errors and any errors that have escaped final proofreading. Thank you. Patient Name: Glynn Guevara  : 1975  MRN: [id number]  History of Presenting Illness     Chief Complaint   Patient presents with    Nausea     Patient reports acute onset of fever and nausea since Tues. Patient reports he does dialysis 5 days a week.  Fever     History Provided By: Patient    HPI: Glynn Guevara, 39 y.o. male with past medical history as documented below presents to the ED with c/o of 3-4 days of nausea, fever and rigors and chills. Pt reports hx of ESRD and does home HD 5 days a week, last session earlier today. Pt reports low grade fevers throughout the week and has been taking Tylenol with relief. Pt reports hx of MSSA bacteremia in , MRSE bacteremia in 10/2020 and possible L1-L2 discitis/osteomyelitis treated with 6 weeks of vancomycin. He reports his sx's feel the same when he got the bacteremia which prompted ED visit. Pt denies any other alleviating or exacerbating factors. Additionally, pt specifically denies any recent vomiting, abdominal pain, CP, SOB, lightheadedness, dizziness, numbness, weakness, lower extremity swelling, heart palpitations, urinary sxs, diarrhea, constipation, melena, hematochezia, cough, or congestion. There are no other complaints, changes or physical findings pertinent to the HPI at this time.     PCP: Richmond Holman MD    Past History   Past Medical History:  Past Medical History:   Diagnosis Date    Abdominal hematoma     AICD (automatic cardioverter/defibrillator) present     CAD (coronary artery disease)     anterior MI s/p 2 stents 2007    Chronic abdominal pain     Chronic kidney disease     ESRD; Dialysis dependent.  Home [facility] does labs- [facility] tpke    DVT (deep venous thrombosis) ([facility] [address] 2001    DVT of popliteal vein ([address] 11/2011    not anticoagulated due to bleeding, IVC filter    Endocarditis     Gallstone pancreatitis     Gastrointestinal disorder     acid reflux    Gastrointestinal disorder     peptic ulcer    Hemodialysis patient ([address]     High cholesterol     Hypertension     Kidney transplant     b/l kidneys 1995, 2001    Long term current use of anticoagulant therapy     Nephrotic syndrome     Other ill-defined conditions(799.89)     kidny transplant x2,  on dialysis    Other ill-defined conditions(799.89)     home dialysis    Other ill-defined conditions(799.89)     hx recurrent left leg DVT    Peritonitis ([address]     Seizures ([address] 2015    most recent- only had three in lifetime    Small bowel obstruction (HCC)     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    [facility])        Past Surgical History:  Past Surgical History:   Procedure Laterality Date    CARDIAC SURG PROCEDURE UNLIST  2007    2 stents    HX APPENDECTOMY      HX CHOLECYSTECTOMY      HX OTHER SURGICAL      cholecystectomy    HX OTHER SURGICAL  11/2011    exploratory laparotomy    HX OTHER SURGICAL      fem-pop    HX OTHER SURGICAL  02/2013    right upper extremity fistula    HX PACEMAKER Left 6/2009    AICD-st latonya-not connected    HX PACEMAKER Left     AICD-left side-BS-working    HX SMALL BOWEL RESECTION      HX TRANSPLANT  2001     Kidney    HX TRANSPLANT  1992    kidney    HX UROLOGICAL      2 kidney transplants    HX VASCULAR ACCESS Right     femoral access for HD- does 5 day dialysis @ home    IR REMOVE TUNL CVAD W/O PORT / PUMP  10/29/2020    IR REPLACE CVC TUNNELED W/O PORT  9/10/2020    [facility] EDG US EXAM SURGICAL ALTER STOM DUODENUM/JEJUNUM  7/15/2011         [facility] ESOPHAGOGASTRODUODENOSCOPY TRANSORAL DIAGNOSTIC  5/24/2012         VASCULAR SURGERY PROCEDURE UNLIST  11/14/2017    Insertion AV graft right upper arm (bovine); ligation of AV fistula right arm       Family History:  Family History   Problem Relation Age of Onset    COPD Mother     Lung Disease Mother     Cancer Father         stomach    Cancer Maternal Grandmother        Social History:  Social History     Tobacco Use    Smoking status: Never Smoker    Smokeless tobacco: Never Used   Substance Use Topics    Alcohol use: No    Drug use: Yes     Types: Prescription, OTC       Allergies: Allergies   Allergen Reactions    Shellfish Containing Products Swelling     Break out in rash, trouble breathing    Contrast Agent [Iodine] Shortness of Breath    Heparin Analogues Other (comments)     Positive history of HIT       Current Medications:  No current facility-administered medications on file prior to encounter. Current Outpatient Medications on File Prior to Encounter   Medication Sig Dispense Refill    metoprolol succinate (TOPROL-XL) 25 mg XL tablet Take 0.5 Tabs by mouth daily. 30 Tab 0    calcium acetate,phosphat bind, (PHOSLO) 667 mg cap Take 2 Caps by mouth three (3) times daily (with meals). Indications: low amount of calcium in the blood, renal osteodystrophy with hyperphosphatemia 120 Cap 0    atorvastatin (Lipitor) 20 mg tablet Take 20 mg by mouth daily.  warfarin (COUMADIN) 2.5 mg tablet Take 2.5 mg by mouth daily.  acetaminophen (TYLENOL) 500 mg tablet Take 1 Tab by mouth every four (4) hours as needed for Pain. Over the counter 30 Tab 0    omeprazole (PRILOSEC) 20 mg capsule Take 20 mg by mouth daily.  calcitRIOL (ROCALTROL) 0.5 mcg capsule Take 0.5 mcg by mouth daily.  aspirin 81 mg chewable tablet Take 81 mg by mouth daily. Review of Systems   Review of Systems   Constitutional: Positive for chills and fever. HENT: Negative. Negative for congestion and sore throat. Eyes: Negative. Respiratory: Negative. Negative for cough, chest tightness, shortness of breath and wheezing. Cardiovascular: Negative. Negative for chest pain, palpitations and leg swelling. Gastrointestinal: Positive for nausea. Negative for abdominal distention, abdominal pain, blood in stool, constipation, diarrhea and vomiting. Endocrine: Negative. Genitourinary: Negative. Negative for dysuria, flank pain, frequency, hematuria and urgency. Musculoskeletal: Positive for myalgias. Negative for arthralgias and back pain. Skin: Negative. Negative for color change and rash. Neurological: Negative. Negative for dizziness, syncope, speech difficulty, weakness, light-headedness, numbness and headaches. Hematological: Negative. Psychiatric/Behavioral: Negative. Negative for confusion and self-injury. The patient is not nervous/anxious. All other systems reviewed and are negative. Physical Exam   Physical Exam  Vitals signs and nursing note reviewed. Constitutional:       Appearance: He is well-developed. He is not toxic-appearing. Comments: Temp 98.2   HENT:      Head: Normocephalic and atraumatic. Mouth/Throat:      Pharynx: No posterior oropharyngeal erythema. Eyes:      Conjunctiva/sclera: Conjunctivae normal.   Neck:      Musculoskeletal: Normal range of motion. Cardiovascular:      Rate and Rhythm: Normal rate and regular rhythm. Heart sounds: Normal heart sounds. No murmur. No friction rub. No gallop. Pulmonary:      Effort: Pulmonary effort is normal. No respiratory distress. Breath sounds: Normal breath sounds. No wheezing or rales. Chest:      Chest wall: No tenderness. Abdominal:      General: Bowel sounds are normal. There is no distension. Palpations: Abdomen is soft. There is no mass. Tenderness: There is no abdominal tenderness. There is no guarding or rebound. Musculoskeletal: Normal range of motion.       Comments: UE AVF with good thrill Skin:     General: Skin is warm. Neurological:      General: No focal deficit present. Mental Status: He is alert and oriented to person, place, and time. Motor: No abnormal muscle tone. Psychiatric:         Behavior: Behavior is cooperative. Diagnostic Study Results     Labs -   I have personally reviewed and interpreted all available laboratory results. Recent Results (from the past 24 hour(s))   EKG, 12 LEAD, INITIAL    Collection Time: 03/27/21  3:25 AM   Result Value Ref Range    Ventricular Rate 98 BPM    Atrial Rate 98 BPM    P-R Interval 164 ms    QRS Duration 104 ms    Q-T Interval 384 ms    QTC Calculation (Bezet) 490 ms    Calculated P Axis 63 degrees    Calculated R Axis 59 degrees    Calculated T Axis -74 degrees    Diagnosis       Sinus rhythm with occasional premature ventricular complexes  T wave abnormality, consider inferior ischemia  Prolonged QT  When compared with ECG of 29-OCT-2020 14:03,  premature ventricular complexes are now present     CBC WITH AUTOMATED DIFF    Collection Time: 03/27/21  3:31 AM   Result Value Ref Range    WBC 12.7 (H) 4.1 - 11.1 K/uL    RBC 2.52 (L) 4.10 - 5.70 M/uL    HGB 8.0 (L) 12.1 - 17.0 g/dL    HCT 23.5 (L) 36.6 - 50.3 %    MCV 93.3 80.0 - 99.0 FL    MCH 31.7 26.0 - 34.0 PG    MCHC 34.0 30.0 - 36.5 g/dL    RDW 16.6 (H) 11.5 - 14.5 %    PLATELET 447 186 - 427 K/uL    MPV 10.9 8.9 - 12.9 FL    NRBC 0.0 0  WBC    ABSOLUTE NRBC 0.00 0.00 - 0.01 K/uL    NEUTROPHILS 75 32 - 75 %    LYMPHOCYTES 8 (L) 12 - 49 %    MONOCYTES 12 5 - 13 %    EOSINOPHILS 3 0 - 7 %    BASOPHILS 1 0 - 1 %    IMMATURE GRANULOCYTES 1 (H) 0.0 - 0.5 %    ABS. NEUTROPHILS 9.7 (H) 1.8 - 8.0 K/UL    ABS. LYMPHOCYTES 1.0 0.8 - 3.5 K/UL    ABS. MONOCYTES 1.5 (H) 0.0 - 1.0 K/UL    ABS. EOSINOPHILS 0.4 0.0 - 0.4 K/UL    ABS. BASOPHILS 0.1 0.0 - 0.1 K/UL    ABS. IMM.  GRANS. 0.1 (H) 0.00 - 0.04 K/UL    DF AUTOMATED     METABOLIC PANEL, COMPREHENSIVE    Collection Time: 03/27/21  3:31 AM   Result Value Ref Range    Sodium 128 (L) 136 - 145 mmol/L    Potassium 4.6 3.5 - 5.1 mmol/L    Chloride 91 (L) 97 - 108 mmol/L    CO2 20 (L) 21 - 32 mmol/L    Anion gap 17 (H) 5 - 15 mmol/L    Glucose 95 65 - 100 mg/dL    BUN 84 (H) 6 - 20 MG/DL    Creatinine 11.20 (H) 0.70 - 1.30 MG/DL    BUN/Creatinine ratio 8 (L) 12 - 20      GFR est AA 6 (L) >60 ml/min/1.73m2    GFR est non-AA 5 (L) >60 ml/min/1.73m2    Calcium 8.3 (L) 8.5 - 10.1 MG/DL    Bilirubin, total 0.8 0.2 - 1.0 MG/DL    ALT (SGPT) 13 12 - 78 U/L    AST (SGOT) 10 (L) 15 - 37 U/L    Alk. phosphatase 127 (H) 45 - 117 U/L    Protein, total 7.9 6.4 - 8.2 g/dL    Albumin 3.2 (L) 3.5 - 5.0 g/dL    Globulin 4.7 (H) 2.0 - 4.0 g/dL    A-G Ratio 0.7 (L) 1.1 - 2.2     PTT    Collection Time: 03/27/21  3:31 AM   Result Value Ref Range    aPTT 41.8 (H) 22.1 - 31.0 sec    aPTT, therapeutic range     58.0 - 77.0 SECS   PROTHROMBIN TIME + INR    Collection Time: 03/27/21  3:31 AM   Result Value Ref Range    INR 2.6 (H) 0.9 - 1.1      Prothrombin time 25.6 (H) 9.0 - 11.1 sec   TROPONIN I    Collection Time: 03/27/21  3:31 AM   Result Value Ref Range    Troponin-I, Qt. <0.05 <0.05 ng/mL   POC LACTIC ACID    Collection Time: 03/27/21  3:33 AM   Result Value Ref Range    Lactic Acid (POC) 0.84 0.40 - 2.00 mmol/L       Radiologic Studies -   I have personally reviewed and interpreted all available imaging studies and agree with radiology interpretation and report. XR CHEST PORT   Final Result   No acute process. CT Results  (Last 48 hours)    None        CXR Results  (Last 48 hours)               03/27/21 0334  XR CHEST PORT Final result    Impression:  No acute process. Narrative:  INDICATION: sob, fever       COMPARISON: October 20, 2020       FINDINGS:       Frontal and lateral views of the chest demonstrate cardiomegaly and left   subclavian defibrillator. Subcutaneous defibrillator also noted.  The lungs are   adequately expanded. There is no edema, effusion, consolidation, or   pneumothorax. The osseous structures are unremarkable. Medical Decision Making   I reviewed the vital signs, available nursing notes, past medical history, past surgical history, family history and social history. Vital Signs-Reviewed the patient's vital signs. Patient Vitals for the past 24 hrs:   Temp Pulse Resp BP SpO2   03/27/21 0500  95 20 114/89 100 %   03/27/21 0445  94 22 124/89 100 %   03/27/21 0430  94 23 (!) 124/92 100 %   03/27/21 0415  93 23 116/87 100 %   03/27/21 0400  99 19 (!) 131/93 100 %   03/27/21 0356     100 %   03/27/21 0350  (!) 103 16 (!) 145/96 100 %   03/27/21 0349 98.5 °F (36.9 °C) 100 20 (!) 145/96 94 %   03/27/21 0244 98.2 °F (36.8 °C) 100 20 (!) 154/92 96 %       Pulse Oximetry Analysis - 96% on RA    Cardiac Monitor:   Rate: 100 bpm  The cardiac monitor revealed the following rhythm as interpreted by me: Normal Sinus Rhythm     The cardiac monitor was ordered secondary to the patient's history of ESRD and to monitor the patient for dysrhythmia    ED EKG interpretation:  Rhythm: normal sinus rhythm; and regular . Rate (approx.): 98; Axis: normal; P wave: normal; QRS interval: normal ; ST/T wave: non-specific changes; Other findings: normal. This EKG was interpreted by Yasir Brown MD    Records Reviewed: Nursing Notes, Old Medical Records, Previous electrocardiograms, Previous Radiology Studies and Previous Laboratory Studies    Provider Notes (Medical Decision Making):   Patient presenting with nausea, fever and myalgias, hx of bacteremia, Afebrile in ED, stable vitals and nontoxic appearing. DDx: bacteremia, sepsis, infection, anemia, electrolyte anomoly (hypo or hyperkalemia, hypomagnesemia), hypothyroid, dehydration, depression, CA, ACS. Will obtain EKG, UA, labwork for any urgent/emergent pathology. Will reassess and monitor while in ED.     ED Course:   I am the first provider for this patient's ED visit today. Initial assessment performed. I discussed presenting problems, concerns and my formulated plan for today's visit with the patient and any available family members at bedside. I encouraged them to ask questions as they arise throughout the visit. I reviewed our electronic medical record system for any past medical records that were available that may contribute to the patient's current condition, the nursing notes and vital signs from today's visit. ED Orders Placed :  Orders Placed This Encounter    CULTURE, BLOOD, PAIRED    XR CHEST PORT    CBC WITH AUTOMATED DIFF    METABOLIC PANEL, COMPREHENSIVE    PTT    PT    TROPONIN I    TROPONIN I    POC LACTIC ACID    POC LACTIC ACID    EKG 12 LEAD INITIAL    INSERT PERIPHERAL IV ONE TIME STAT    fentaNYL citrate (PF) injection 50 mcg    ondansetron (ZOFRAN) injection 4 mg       ED Medications Administered:  Medications   fentaNYL citrate (PF) injection 50 mcg (50 mcg IntraVENous Given 3/27/21 0339)   ondansetron (ZOFRAN) injection 4 mg (4 mg IntraVENous Given 3/27/21 0340)        Progress Note:  Patient has been reassessed and reports feeling better and symptoms have improved significantly after ED treatment. Beverly Mota's final labs and imaging have been reviewed with him and available family and/or caregiver. They have been counseled regarding his diagnosis. He verbally conveys understanding and agreement of the signs, symptoms, diagnosis, treatment and prognosis and additionally agrees to follow up as recommended with Dr. Jadon Barros MD and/or specialist in 24 - 48 hours. He also agrees with the care-plan we created together and conveys that all of his questions have been answered.   I have also put together a packet of discharge instructions for him that include: 1) educational information regarding their diagnosis, 2) how to care for their diagnosis at home, as well a 3) list of reasons why they would want to return to the ED prior to their follow-up appointment should the patient's condition change or symptoms worsen. I have answered all questions to the patient's satisfaction. Strict return precautions given. He both understood and agreed with plan as discussed. Vital signs stable for discharge. Disposition:   DISCHARGE  The pt is ready for discharge. The pt's signs, symptoms, diagnosis, and discharge instructions have been discussed and pt has conveyed their understanding. The pt is to follow up as recommended or return to ER should their symptoms worsen. Plan has been discussed and pt is in agreement. Plan:  1. Return precautions as discussed with patient and available family and/or caregiver. 2.   Discharge Medication List as of 3/27/2021  6:46 AM      START taking these medications    Details   ondansetron (Zofran ODT) 4 mg disintegrating tablet 1 Tab by SubLINGual route every eight (8) hours as needed for Nausea or Vomiting., Normal, Disp-20 Tab, R-0      oxyCODONE-acetaminophen (Percocet) 5-325 mg per tablet Take 1 Tab by mouth every eight (8) hours as needed for Pain for up to 3 days. Max Daily Amount: 3 Tabs. Indications: pain, Normal, Disp-10 Tab, R-0         CONTINUE these medications which have NOT CHANGED    Details   metoprolol succinate (TOPROL-XL) 25 mg XL tablet Take 0.5 Tabs by mouth daily. , Normal, Disp-30 Tab,R-0      calcium acetate,phosphat bind, (PHOSLO) 667 mg cap Take 2 Caps by mouth three (3) times daily (with meals). Indications: low amount of calcium in the blood, renal osteodystrophy with hyperphosphatemia, Normal, Disp-120 Cap,R-0      atorvastatin (Lipitor) 20 mg tablet Take 20 mg by mouth daily. , Historical Med      warfarin (COUMADIN) 2.5 mg tablet Take 2.5 mg by mouth daily. , Historical Med      acetaminophen (TYLENOL) 500 mg tablet Take 1 Tab by mouth every four (4) hours as needed for Pain.  Over the counter, No Print, Disp-30 Tab,R-0      omeprazole (PRILOSEC) 20 mg capsule Take 20 mg by mouth daily. , Historical Med      calcitRIOL (ROCALTROL) 0.5 mcg capsule Take 0.5 mcg by mouth daily. , Historical Med      aspirin 81 mg chewable tablet Take 81 mg by mouth daily. , Historical Med           3. Follow-up Information     Follow up With Specialties Details Why Contact Info    [facility] EMERGENCY DEPT Emergency Medicine  As needed, If symptoms worsen [address] [place] [address]  [phone]          Instructed to return to ED if worse  Diagnosis   Clinical Impression:  1. Nausea without vomiting    2. Suspected COVID-19 virus infection    3. Leukocytosis, unspecified type    4. Acute hyponatremia    5. ESRD on dialysis [facility])        Attestation:  Bibiana Michel MD, am the attending of record for this patient. I personally performed the services described in this documentation on this date, 3/27/2021 for patient, Raheel Paiz. I have reviewed the chart and verified that the record is accurate and complete.

## 2021-03-28 NOTE — PROGRESS NOTES
Spoke with patient on the phone who endorses he is not feeling any better. I advised patient return to the hospital for further treatment and IV antibiotics.

## 2021-03-29 ENCOUNTER — HOSPITAL ENCOUNTER (INPATIENT)
Age: 46
LOS: 12 days | Discharge: ANOTHER HOSPITAL | DRG: 252 | End: 2021-04-10
Attending: EMERGENCY MEDICINE | Admitting: STUDENT IN AN ORGANIZED HEALTH CARE EDUCATION/TRAINING PROGRAM
Payer: MEDICARE

## 2021-03-29 ENCOUNTER — APPOINTMENT (OUTPATIENT)
Dept: GENERAL RADIOLOGY | Age: 46
DRG: 252 | End: 2021-03-29
Attending: EMERGENCY MEDICINE
Payer: MEDICARE

## 2021-03-29 ENCOUNTER — APPOINTMENT (OUTPATIENT)
Dept: NON INVASIVE DIAGNOSTICS | Age: 46
DRG: 252 | End: 2021-03-29
Attending: STUDENT IN AN ORGANIZED HEALTH CARE EDUCATION/TRAINING PROGRAM
Payer: MEDICARE

## 2021-03-29 DIAGNOSIS — N18.6 ESRD ON HEMODIALYSIS (HCC): ICD-10-CM

## 2021-03-29 DIAGNOSIS — E87.1 HYPONATREMIA: ICD-10-CM

## 2021-03-29 DIAGNOSIS — D63.1 ANEMIA IN ESRD (END-STAGE RENAL DISEASE) (HCC): ICD-10-CM

## 2021-03-29 DIAGNOSIS — Z99.2 ESRD ON HEMODIALYSIS (HCC): ICD-10-CM

## 2021-03-29 DIAGNOSIS — R78.81 BACTEREMIA: Primary | ICD-10-CM

## 2021-03-29 DIAGNOSIS — N18.6 ANEMIA IN ESRD (END-STAGE RENAL DISEASE) (HCC): ICD-10-CM

## 2021-03-29 DIAGNOSIS — B95.7 COAGULASE NEGATIVE STAPHYLOCOCCUS BACTEREMIA: ICD-10-CM

## 2021-03-29 DIAGNOSIS — R78.81 COAGULASE NEGATIVE STAPHYLOCOCCUS BACTEREMIA: ICD-10-CM

## 2021-03-29 DIAGNOSIS — Z95.810 AICD (AUTOMATIC CARDIOVERTER/DEFIBRILLATOR) PRESENT: Chronic | ICD-10-CM

## 2021-03-29 PROBLEM — D72.829 LEUCOCYTOSIS: Status: ACTIVE | Noted: 2021-03-29

## 2021-03-29 LAB
ALBUMIN SERPL-MCNC: 3.4 G/DL (ref 3.5–5)
ALBUMIN/GLOB SERPL: 0.7 {RATIO} (ref 1.1–2.2)
ALP SERPL-CCNC: 148 U/L (ref 45–117)
ALT SERPL-CCNC: 14 U/L (ref 12–78)
ANION GAP SERPL CALC-SCNC: 19 MMOL/L (ref 5–15)
AST SERPL-CCNC: 9 U/L (ref 15–37)
ATRIAL RATE: 82 BPM
BASOPHILS # BLD: 0.1 K/UL (ref 0–0.1)
BASOPHILS NFR BLD: 1 % (ref 0–1)
BILIRUB SERPL-MCNC: 0.6 MG/DL (ref 0.2–1)
BUN SERPL-MCNC: 65 MG/DL (ref 6–20)
BUN/CREAT SERPL: 6 (ref 12–20)
CALCIUM SERPL-MCNC: 9.4 MG/DL (ref 8.5–10.1)
CALCULATED P AXIS, ECG09: 66 DEGREES
CALCULATED R AXIS, ECG10: 68 DEGREES
CALCULATED T AXIS, ECG11: 173 DEGREES
CHLORIDE SERPL-SCNC: 89 MMOL/L (ref 97–108)
CO2 SERPL-SCNC: 20 MMOL/L (ref 21–32)
COMMENT, HOLDF: NORMAL
COMMENT, HOLDF: NORMAL
CREAT SERPL-MCNC: 10.1 MG/DL (ref 0.7–1.3)
DIAGNOSIS, 93000: NORMAL
DIFFERENTIAL METHOD BLD: ABNORMAL
ECHO AO ROOT DIAM: 3.31 CM
ECHO AV AREA PEAK VELOCITY: 2.13 CM2
ECHO AV AREA/BSA PEAK VELOCITY: 1.2 CM2/M2
ECHO AV PEAK GRADIENT: 9.95 MMHG
ECHO AV PEAK VELOCITY: 157.31 CM/S
ECHO EST RA PRESSURE: 10 MMHG
ECHO LA AREA 4C: 31.76 CM2
ECHO LA MAJOR AXIS: 4.41 CM
ECHO LA MINOR AXIS: 2.49 CM
ECHO LA VOL 2C: 120.1 ML (ref 18–58)
ECHO LA VOL 4C: 112.81 ML (ref 18–58)
ECHO LA VOL BP: 134.53 ML (ref 18–58)
ECHO LA VOL/BSA BIPLANE: 76.02 ML/M2 (ref 16–28)
ECHO LA VOLUME INDEX A2C: 67.86 ML/M2 (ref 16–28)
ECHO LA VOLUME INDEX A4C: 63.74 ML/M2 (ref 16–28)
ECHO LV INTERNAL DIMENSION DIASTOLIC: 5.91 CM (ref 4.2–5.9)
ECHO LV INTERNAL DIMENSION SYSTOLIC: 4.46 CM
ECHO LV IVSD: 1.18 CM (ref 0.6–1)
ECHO LV IVSS: 1.27 CM
ECHO LV MASS 2D: 347.1 G (ref 88–224)
ECHO LV MASS INDEX 2D: 196.1 G/M2 (ref 49–115)
ECHO LV POSTERIOR WALL DIASTOLIC: 1.45 CM (ref 0.6–1)
ECHO LV POSTERIOR WALL SYSTOLIC: 1.57 CM
ECHO LVOT DIAM: 2.1 CM
ECHO LVOT PEAK GRADIENT: 3.87 MMHG
ECHO LVOT PEAK VELOCITY: 96.89 CM/S
ECHO MV MAX VELOCITY: 113.7 CM/S
ECHO MV MEAN GRADIENT: 2.12 MMHG
ECHO MV PEAK GRADIENT: 5.17 MMHG
ECHO MV VTI: 22.52 CM
ECHO PV MAX VELOCITY: 111.89 CM/S
ECHO PV PEAK INSTANTANEOUS GRADIENT SYSTOLIC: 5.01 MMHG
ECHO RIGHT VENTRICULAR SYSTOLIC PRESSURE (RVSP): 41.23 MMHG
ECHO RV INTERNAL DIMENSION: 4.94 CM
ECHO TV REGURGITANT MAX VELOCITY: 279.41 CM/S
ECHO TV REGURGITANT PEAK GRADIENT: 31.23 MMHG
EOSINOPHIL # BLD: 0.4 K/UL (ref 0–0.4)
EOSINOPHIL NFR BLD: 2 % (ref 0–7)
ERYTHROCYTE [DISTWIDTH] IN BLOOD BY AUTOMATED COUNT: 16.5 % (ref 11.5–14.5)
GLOBULIN SER CALC-MCNC: 5.2 G/DL (ref 2–4)
GLUCOSE SERPL-MCNC: 78 MG/DL (ref 65–100)
HCT VFR BLD AUTO: 23 % (ref 36.6–50.3)
HGB BLD-MCNC: 7.7 G/DL (ref 12.1–17)
IMM GRANULOCYTES # BLD AUTO: 0.1 K/UL (ref 0–0.04)
IMM GRANULOCYTES NFR BLD AUTO: 1 % (ref 0–0.5)
INR PPP: 1.7 (ref 0.9–1.1)
LACTATE BLD-SCNC: 0.8 MMOL/L (ref 0.4–2)
LYMPHOCYTES # BLD: 1.1 K/UL (ref 0.8–3.5)
LYMPHOCYTES NFR BLD: 6 % (ref 12–49)
MCH RBC QN AUTO: 31.4 PG (ref 26–34)
MCHC RBC AUTO-ENTMCNC: 33.5 G/DL (ref 30–36.5)
MCV RBC AUTO: 93.9 FL (ref 80–99)
MONOCYTES # BLD: 1.7 K/UL (ref 0–1)
MONOCYTES NFR BLD: 10 % (ref 5–13)
NEUTS SEG # BLD: 14.4 K/UL (ref 1.8–8)
NEUTS SEG NFR BLD: 80 % (ref 32–75)
NRBC # BLD: 0 K/UL (ref 0–0.01)
NRBC BLD-RTO: 0 PER 100 WBC
P-R INTERVAL, ECG05: 166 MS
PLATELET # BLD AUTO: 237 K/UL (ref 150–400)
PMV BLD AUTO: 10.5 FL (ref 8.9–12.9)
POTASSIUM SERPL-SCNC: 5.2 MMOL/L (ref 3.5–5.1)
PROT SERPL-MCNC: 8.6 G/DL (ref 6.4–8.2)
PROTHROMBIN TIME: 16.9 SEC (ref 9–11.1)
Q-T INTERVAL, ECG07: 416 MS
QRS DURATION, ECG06: 110 MS
QTC CALCULATION (BEZET), ECG08: 486 MS
RBC # BLD AUTO: 2.45 M/UL (ref 4.1–5.7)
SAMPLES BEING HELD,HOLD: NORMAL
SAMPLES BEING HELD,HOLD: NORMAL
SODIUM SERPL-SCNC: 128 MMOL/L (ref 136–145)
TROPONIN I SERPL-MCNC: <0.05 NG/ML
VENTRICULAR RATE, ECG03: 82 BPM
WBC # BLD AUTO: 17.8 K/UL (ref 4.1–11.1)

## 2021-03-29 PROCEDURE — 85025 COMPLETE CBC W/AUTO DIFF WBC: CPT

## 2021-03-29 PROCEDURE — 74011250636 HC RX REV CODE- 250/636: Performed by: INTERNAL MEDICINE

## 2021-03-29 PROCEDURE — 80053 COMPREHEN METABOLIC PANEL: CPT

## 2021-03-29 PROCEDURE — 99284 EMERGENCY DEPT VISIT MOD MDM: CPT

## 2021-03-29 PROCEDURE — 74011000258 HC RX REV CODE- 258: Performed by: INTERNAL MEDICINE

## 2021-03-29 PROCEDURE — 65270000029 HC RM PRIVATE

## 2021-03-29 PROCEDURE — 36415 COLL VENOUS BLD VENIPUNCTURE: CPT

## 2021-03-29 PROCEDURE — 5A1D70Z PERFORMANCE OF URINARY FILTRATION, INTERMITTENT, LESS THAN 6 HOURS PER DAY: ICD-10-PCS | Performed by: INTERNAL MEDICINE

## 2021-03-29 PROCEDURE — 74011250636 HC RX REV CODE- 250/636: Performed by: EMERGENCY MEDICINE

## 2021-03-29 PROCEDURE — 74011250637 HC RX REV CODE- 250/637: Performed by: STUDENT IN AN ORGANIZED HEALTH CARE EDUCATION/TRAINING PROGRAM

## 2021-03-29 PROCEDURE — 87077 CULTURE AEROBIC IDENTIFY: CPT

## 2021-03-29 PROCEDURE — 93005 ELECTROCARDIOGRAM TRACING: CPT

## 2021-03-29 PROCEDURE — 71045 X-RAY EXAM CHEST 1 VIEW: CPT

## 2021-03-29 PROCEDURE — 99222 1ST HOSP IP/OBS MODERATE 55: CPT | Performed by: STUDENT IN AN ORGANIZED HEALTH CARE EDUCATION/TRAINING PROGRAM

## 2021-03-29 PROCEDURE — 87040 BLOOD CULTURE FOR BACTERIA: CPT

## 2021-03-29 PROCEDURE — 74011250637 HC RX REV CODE- 250/637: Performed by: EMERGENCY MEDICINE

## 2021-03-29 PROCEDURE — 90935 HEMODIALYSIS ONE EVALUATION: CPT

## 2021-03-29 PROCEDURE — 87186 SC STD MICRODIL/AGAR DIL: CPT

## 2021-03-29 PROCEDURE — 84484 ASSAY OF TROPONIN QUANT: CPT

## 2021-03-29 PROCEDURE — 93306 TTE W/DOPPLER COMPLETE: CPT

## 2021-03-29 PROCEDURE — 83605 ASSAY OF LACTIC ACID: CPT

## 2021-03-29 PROCEDURE — 96374 THER/PROPH/DIAG INJ IV PUSH: CPT

## 2021-03-29 PROCEDURE — 85610 PROTHROMBIN TIME: CPT

## 2021-03-29 PROCEDURE — 74011250637 HC RX REV CODE- 250/637: Performed by: INTERNAL MEDICINE

## 2021-03-29 RX ORDER — ONDANSETRON 2 MG/ML
4 INJECTION INTRAMUSCULAR; INTRAVENOUS
Status: DISCONTINUED | OUTPATIENT
Start: 2021-03-29 | End: 2021-04-10 | Stop reason: HOSPADM

## 2021-03-29 RX ORDER — ATORVASTATIN CALCIUM 20 MG/1
20 TABLET, FILM COATED ORAL DAILY
Status: DISCONTINUED | OUTPATIENT
Start: 2021-03-29 | End: 2021-04-10 | Stop reason: HOSPADM

## 2021-03-29 RX ORDER — ACETAMINOPHEN 325 MG/1
650 TABLET ORAL
Status: DISCONTINUED | OUTPATIENT
Start: 2021-03-29 | End: 2021-04-10 | Stop reason: HOSPADM

## 2021-03-29 RX ORDER — ACETAMINOPHEN 650 MG/1
650 SUPPOSITORY RECTAL
Status: DISCONTINUED | OUTPATIENT
Start: 2021-03-29 | End: 2021-04-10 | Stop reason: HOSPADM

## 2021-03-29 RX ORDER — ONDANSETRON 4 MG/1
4 TABLET, ORALLY DISINTEGRATING ORAL
Status: DISCONTINUED | OUTPATIENT
Start: 2021-03-29 | End: 2021-04-10 | Stop reason: HOSPADM

## 2021-03-29 RX ORDER — CALCIUM ACETATE 667 MG/1
2 CAPSULE ORAL
Status: DISCONTINUED | OUTPATIENT
Start: 2021-03-29 | End: 2021-04-10 | Stop reason: HOSPADM

## 2021-03-29 RX ORDER — SODIUM CHLORIDE 0.9 % (FLUSH) 0.9 %
5-40 SYRINGE (ML) INJECTION EVERY 8 HOURS
Status: DISCONTINUED | OUTPATIENT
Start: 2021-03-29 | End: 2021-04-10 | Stop reason: HOSPADM

## 2021-03-29 RX ORDER — POLYETHYLENE GLYCOL 3350 17 G/17G
17 POWDER, FOR SOLUTION ORAL DAILY PRN
Status: DISCONTINUED | OUTPATIENT
Start: 2021-03-29 | End: 2021-04-04

## 2021-03-29 RX ORDER — HYDROCODONE BITARTRATE AND ACETAMINOPHEN 5; 325 MG/1; MG/1
1 TABLET ORAL
Status: COMPLETED | OUTPATIENT
Start: 2021-03-29 | End: 2021-03-29

## 2021-03-29 RX ORDER — GUAIFENESIN 100 MG/5ML
81 LIQUID (ML) ORAL DAILY
Status: DISCONTINUED | OUTPATIENT
Start: 2021-03-29 | End: 2021-04-10 | Stop reason: HOSPADM

## 2021-03-29 RX ORDER — SODIUM CHLORIDE 0.9 % (FLUSH) 0.9 %
5-40 SYRINGE (ML) INJECTION AS NEEDED
Status: DISCONTINUED | OUTPATIENT
Start: 2021-03-29 | End: 2021-04-10 | Stop reason: HOSPADM

## 2021-03-29 RX ORDER — CALCITRIOL 0.25 UG/1
0.5 CAPSULE ORAL DAILY
Status: DISCONTINUED | OUTPATIENT
Start: 2021-03-29 | End: 2021-04-10 | Stop reason: HOSPADM

## 2021-03-29 RX ORDER — VANCOMYCIN/0.9 % SOD CHLORIDE 1.5G/250ML
1500 PLASTIC BAG, INJECTION (ML) INTRAVENOUS
Status: COMPLETED | OUTPATIENT
Start: 2021-03-29 | End: 2021-03-29

## 2021-03-29 RX ORDER — OXYCODONE AND ACETAMINOPHEN 5; 325 MG/1; MG/1
1 TABLET ORAL
Status: DISCONTINUED | OUTPATIENT
Start: 2021-03-29 | End: 2021-04-05

## 2021-03-29 RX ORDER — PANTOPRAZOLE SODIUM 40 MG/1
40 TABLET, DELAYED RELEASE ORAL
Status: DISCONTINUED | OUTPATIENT
Start: 2021-03-29 | End: 2021-04-10 | Stop reason: HOSPADM

## 2021-03-29 RX ORDER — METOPROLOL SUCCINATE 25 MG/1
12.5 TABLET, EXTENDED RELEASE ORAL DAILY
Status: DISCONTINUED | OUTPATIENT
Start: 2021-03-29 | End: 2021-04-10 | Stop reason: HOSPADM

## 2021-03-29 RX ADMIN — CALCIUM ACETATE 1334 MG: 667 CAPSULE ORAL at 08:57

## 2021-03-29 RX ADMIN — VANCOMYCIN HYDROCHLORIDE 1500 MG: 10 INJECTION, POWDER, LYOPHILIZED, FOR SOLUTION INTRAVENOUS at 05:41

## 2021-03-29 RX ADMIN — HYDROCODONE BITARTRATE AND ACETAMINOPHEN 1 TABLET: 5; 325 TABLET ORAL at 05:40

## 2021-03-29 RX ADMIN — METOPROLOL SUCCINATE 12.5 MG: 25 TABLET, EXTENDED RELEASE ORAL at 08:57

## 2021-03-29 RX ADMIN — PANTOPRAZOLE SODIUM 40 MG: 40 TABLET, DELAYED RELEASE ORAL at 08:57

## 2021-03-29 RX ADMIN — VANCOMYCIN HYDROCHLORIDE 500 MG: 500 INJECTION, POWDER, LYOPHILIZED, FOR SOLUTION INTRAVENOUS at 18:52

## 2021-03-29 RX ADMIN — EPOETIN ALFA-EPBX 12000 UNITS: 10000 INJECTION, SOLUTION INTRAVENOUS; SUBCUTANEOUS at 22:08

## 2021-03-29 RX ADMIN — ACETAMINOPHEN 650 MG: 325 TABLET, FILM COATED ORAL at 08:59

## 2021-03-29 RX ADMIN — OXYCODONE HYDROCHLORIDE AND ACETAMINOPHEN 1 TABLET: 5; 325 TABLET ORAL at 17:31

## 2021-03-29 RX ADMIN — CALCIUM ACETATE 1334 MG: 667 CAPSULE ORAL at 17:31

## 2021-03-29 RX ADMIN — ATORVASTATIN CALCIUM 20 MG: 20 TABLET, FILM COATED ORAL at 08:57

## 2021-03-29 RX ADMIN — CALCITRIOL CAPSULES 0.25 MCG 0.5 MCG: 0.25 CAPSULE ORAL at 08:57

## 2021-03-29 RX ADMIN — ONDANSETRON 4 MG: 4 TABLET, ORALLY DISINTEGRATING ORAL at 08:59

## 2021-03-29 RX ADMIN — OXYCODONE HYDROCHLORIDE AND ACETAMINOPHEN 1 TABLET: 5; 325 TABLET ORAL at 22:09

## 2021-03-29 RX ADMIN — ASPIRIN 81 MG: 81 TABLET, CHEWABLE ORAL at 08:57

## 2021-03-29 RX ADMIN — Medication 10 ML: at 22:08

## 2021-03-29 NOTE — PROGRESS NOTES
Pharmacy Automatic Renal Dosing Protocol - Antimicrobials    Indication for Antimicrobials: Bacteremia (MRSE) Hx of endocarditis     Current Regimen of Each Antimicrobial:  Vancomycin per HD protocol (Start date 3/29; day 1)    Previous Antimicrobial Therapy:      Goal Level:   (AUC: 400 - 600 mg/hr/Liter/day)     Date Dose & Interval Measured (mcg/mL) Extrapolated (mcg/mL)                       Date & time of next level:    Significant Cultures:       Radiology / Imaging results: (X-ray, CT scan or MRI):     Paralysis, amputations, malnutrition:     Labs:  Recent Labs     21  0506 21  0331   CREA 10.10* 11.20*   BUN 65* 84*   WBC 17.8* 12.7*     Temp (24hrs), Av.2 °F (36.8 °C), Min:98.1 °F (36.7 °C), Max:98.2 °F (36.8 °C)      Is the Patient on Dialysis? Yes: HD days are TBD/nephrology    Creatinine Clearance (mL/min):   CrCl (Actual Body Weight): 8.8  CrCl (Adjusted Body Weight): 8.8  CrCl (Ideal Body Weight): 8.8    Impression/Plan:   Pending ID and Nephrology consult  Vancomycin started at 1500 mg now then 500 mg after each dialysis session. BMP daily. Antimicrobial stop date: TBD. Pharmacy will follow daily and adjust medications as appropriate for renal function and/or serum levels.     Thank you,  Sundar Goldberg, PHARMD

## 2021-03-29 NOTE — CONSULTS
NEPHROLOGY CONSULT NOTE           Patient: Lore Vazquez MRN: [id number]  PCP: Roseann Wheeler MD   :     1975  Age:   39 y.o. Sex:  male      Referring physician: Julien Chavarria MD      REASON FOR CONSULT:  CHRISTINE    HPI:  Lore Vazquez is a 39 y.o. male  With PMH significant for ESRD sec to nephrotic syndrome, currently on home hemo HD under Dr Lindy Farooq from [address] [place] [address] and has LUE AVG, gets dialyzed 5 days per week and has a DW of 65 kgs. He presented to ED on Friday evening and early Saturday morning for not feeling well and ongoing myalgias . He got blood cultures drawn and was later sent to home. He got himself dialyzed at home . He was then called again to come back to hospital as all 4 of his blood cultures came back positive for  Gram positive cocci. He is now admitted and was started on vancomycin. ID has been consulted and ECHO has been ordered. Renal consult was requested for RRT needs. Review of Systems  All systems reviewed and were negative except for above    Past Medical History:   Diagnosis Date    Abdominal hematoma     AICD (automatic cardioverter/defibrillator) present     CAD (coronary artery disease)     anterior MI s/p 2 stents      Chronic abdominal pain     Chronic kidney disease     ESRD; Dialysis dependent.  Home [facility] does labs- [facility] tpke    DVT (deep venous thrombosis) ([address]     DVT of popliteal vein ([address] 2011    not anticoagulated due to bleeding, IVC filter    Endocarditis     Gallstone pancreatitis     Gastrointestinal disorder     acid reflux    Gastrointestinal disorder     peptic ulcer    [facility]od[facility]     High cholesterol     Hypertension     Kidney transplant     b/l kidneys ,     Long term current use of anticoagulant therapy     Nephrotic syndrome     Other ill-defined conditions(799.89) kidny transplant x2,  on dialysis    Other ill-defined conditions(799.89)     home dialysis    Other ill-defined conditions(799.89)     hx recurrent left leg DVT    Peritonitis ([address]     Seizures ([address] 2015    most recent- only had three in lifetime    Small bowel obstruction ([address]     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[facility]     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    V-tach [facility])        Past Surgical History:   Procedure Laterality Date    CARDIAC SURG PROCEDURE UNLIST  2007    2 stents    HX APPENDECTOMY      HX CHOLECYSTECTOMY      HX OTHER SURGICAL      cholecystectomy    HX OTHER SURGICAL  11/2011    exploratory laparotomy    HX OTHER SURGICAL      fem-pop    HX OTHER SURGICAL  02/2013    right upper extremity fistula    HX PACEMAKER Left 6/2009    AICD-st latonya-not connected    HX PACEMAKER Left     AICD-left side-BS-working    HX SMALL BOWEL RESECTION      HX TRANSPLANT  2001     Kidney    HX TRANSPLANT  1992    kidney    HX UROLOGICAL      2 kidney transplants    HX VASCULAR ACCESS Right     femoral access for HD- does 5 day dialysis @ home    IR REMOVE TUNL CVAD W/O PORT / PUMP  10/29/2020    IR REPLACE CVC TUNNELED W/O PORT  9/10/2020    [place] EDG US EXAM SURGICAL ALTER STOM DUODENUM/JEJUNUM  7/15/2011         [place] ESOPHAGOGASTRODUODENOSCOPY TRANSORAL DIAGNOSTIC  5/24/2012         VASCULAR SURGERY PROCEDURE UNLIST  11/14/2017    Insertion AV graft right upper arm (bovine); ligation of AV fistula right arm       Allergies   Allergen Reactions    Shellfish Containing Products Swelling     Break out in rash, trouble breathing    Contrast Agent [Iodine] Shortness of Breath    Heparin Analogues Other (comments)     Positive history of HIT       Current Facility-Administered Medications   Medication Dose Route Frequency    Vancomycin - Pharmacy to dose   Other Rx Dosing/Monitoring    atorvastatin (LIPITOR) tablet 20 mg  20 mg Oral DAILY    aspirin chewable tablet 81 mg  81 mg Oral DAILY    metoprolol succinate (TOPROL-XL) XL tablet 12.5 mg  12.5 mg Oral DAILY    pantoprazole (PROTONIX) tablet 40 mg  40 mg Oral ACB    calcitRIOL (ROCALTROL) capsule 0.5 mcg  0.5 mcg Oral DAILY    sodium chloride (NS) flush 5-40 mL  5-40 mL IntraVENous Q8H    sodium chloride (NS) flush 5-40 mL  5-40 mL IntraVENous PRN    acetaminophen (TYLENOL) tablet 650 mg  650 mg Oral Q6H PRN    Or    acetaminophen (TYLENOL) suppository 650 mg  650 mg Rectal Q6H PRN    polyethylene glycol (MIRALAX) packet 17 g  17 g Oral DAILY PRN    ondansetron (ZOFRAN ODT) tablet 4 mg  4 mg Oral Q8H PRN    Or    ondansetron (ZOFRAN) injection 4 mg  4 mg IntraVENous Q6H PRN    calcium acetate(phosphat bind) (PHOSLO) capsule 1,334 mg  2 Cap Oral TID WITH MEALS       Family History   Problem Relation Age of Onset    COPD Mother     Lung Disease Mother     Cancer Father         stomach    Cancer Maternal Grandmother        Social History     Socioeconomic History    Marital status: SINGLE     Spouse name: Not on file    Number of children: Not on file    Years of education: Not on file    Highest education level: Not on file   Occupational History    Not on file   Social Needs    Financial resource strain: Not on file    Food insecurity     Worry: Not on file     Inability: Not on file    Transportation needs     Medical: Not on file     Non-medical: Not on file   Tobacco Use    Smoking status: Never Smoker    Smokeless tobacco: Never Used   Substance and Sexual Activity    Alcohol use: No    Drug use: Yes     Types: Prescription, OTC    Sexual activity: Not on file   Lifestyle    Physical activity     Days per week: Not on file     Minutes per session: Not on file    Stress: Not on file   Relationships    Social connections     Talks on phone: Not on file     Gets together: Not on file     Attends Restoration[employer] service: Not on file     Active member of club or organization: Not on file     Attends meetings of clubs or organizations: Not on file     Relationship status: Not on file    Intimate partner violence     Fear of current or ex partner: Not on file     Emotionally abused: Not on file     Physically abused: Not on file     Forced sexual activity: Not on file   Other Topics Concern    Not on file   Social History Narrative    Not on file       Vitals:    03/29/21 0600 03/29/21 0630 03/29/21 0800 03/29/21 0854   BP: 116/78 113/74  123/72   Pulse: 89 93 89 90   Resp: 14 20  16   Temp:  98.2 °F (36.8 °C)  98.7 °F (37.1 °C)   SpO2: 100% 98%  100%   Weight:       Height:           No intake or output data in the 24 hours ending 03/29/21 1035    PHYSICAL EXAM:  GENERAL : Lying down in bed with no acute distress  HEENT: AT NC PEERLA   NECK: Supple no JVP  CVS: S1 S2 RRR, no murmur or gallops heard  RS: CTABL, no rhonchi,or wheezing heard  ABDOMEN: soft NT ND positive BS  EXTREMITY: No edema clubbing or cyanosis, pedal pulse +  NEUROLOGY: AAA X3, no focal deficit or asterixis  VASCULAR ACCESS: LUE AVG,with good thrill      LABS/STUDIES:  BMP:   Lab Results   Component Value Date/Time     (L) 03/29/2021 05:06 AM    K 5.2 (H) 03/29/2021 05:06 AM    CL 89 (L) 03/29/2021 05:06 AM    CO2 20 (L) 03/29/2021 05:06 AM    AGAP 19 (H) 03/29/2021 05:06 AM    GLU 78 03/29/2021 05:06 AM    BUN 65 (H) 03/29/2021 05:06 AM    CREA 10.10 (H) 03/29/2021 05:06 AM    GFRAA 7 (L) 03/29/2021 05:06 AM    GFRNA 6 (L) 03/29/2021 05:06 AM        CBC:    Lab Results   Component Value Date/Time    WBC 17.8 (H) 03/29/2021 05:06 AM    HGB 7.7 (L) 03/29/2021 05:06 AM    HCT 23.0 (L) 03/29/2021 05:06 AM     03/29/2021 05:06 AM       No results found for: MCACR, MCA1, MCA2, MCA3, MCAU, MCAU2, MCALPOCT    Lab Results   Component Value Date/Time    Color RED 11/16/2012 05:15 PM    Appearance OPAQUE 11/16/2012 05:15 PM    Specific gravity 1.020 11/16/2012 05:15 PM    Specific gravity 1.011 09/17/2012 01:35 AM    pH (UA) 8.0 11/16/2012 05:15 PM    Protein >300 (A) 11/16/2012 05:15 PM    Glucose 100 (A) 11/16/2012 05:15 PM    Ketone NEGATIVE  11/16/2012 05:15 PM    Bilirubin NEGATIVE  09/17/2012 01:35 AM    Urobilinogen 0.2 11/16/2012 05:15 PM    Nitrites NEGATIVE  11/16/2012 05:15 PM    Leukocyte Esterase NEGATIVE  11/16/2012 05:15 PM    Epithelial cells 5-10 11/16/2012 05:15 PM    Bacteria 3+ (A) 11/16/2012 05:15 PM    WBC >100 (H) 11/16/2012 05:15 PM    RBC >100 (H) 11/16/2012 05:15 PM         ASSESSMENT/PLAN:  No new Assessment & Plan notes have been filed under this hospital service since the last note was generated. Service: Internal Medicine    · esrd- home hd- [facility]- HE HAS BEEN ON MWF HD AT UNIT - as he has been on vanco prior to admission  · Hyperkalemia  · Gram positive sepsis  · Anemia of ckd  · Hx of renal tx in past times 2.  · Hypertension  · CAD  · H/o DVT, s/p ivc filter/ h/o HIT    Plan:  -Plan for HD/UF today. -Vancomycin per ID.  -Ongoing work up for source of infection. ECHO has been ordered.  -Will need Eval of AVG also as it is also a foreign body and need to rule out colonization. Might need Tagged RBC scan if unable to find any source. Will let ID decide. -EPO for Anemia    Kee Diop MD  3/29/2021    [facility]:  [email or web address]  Lennie Austin office:  [address] [place] [address], [address] [place] [address] [address]  [place], [id number] [place] [address]  Phone: [phone]  Fax :     [phone]     Home office:  [address] [facility], [address]  Phone - [phone]  Fax - [phone]

## 2021-03-29 NOTE — PROCEDURES
Winter Dialysis Team [facility]  [phone]    Vitals   Pre   Post   Assessment   Pre   Post     Temp  Temp: 98.5 °F (36.9 °C) (03/29/21 1230)  98.2 LOC  AxOx4 AxOx4   HR   Pulse (Heart Rate): 88 (03/29/21 1230) 88 Lungs   CTA CTA    B/P   BP: 127/84 (03/29/21 1230) 133/76 Cardiac   RRR RRR    Resp   Resp Rate: 16 (03/29/21 1230) 16 Skin   CDI CDI    Pain level  Pain Intensity 1: 6 (03/29/21 0854) 7 Edema  generalized   generalized   Orders:    Duration:   Start:    1230 End:    1600 Total:   3.5 hrs   Dialyzer:   Dialyzer/Set Up Inspection: Revaclear (03/29/21 1230)   K Bath:   Dialysate K (mEq/L): 2 (03/29/21 1230)   Ca Bath:   Dialysate CA (mEq/L): 2.5 (03/29/21 1230)   Na/Bicarb:   Dialysate NA (mEq/L): 138 (03/29/21 1230)   Target Fluid Removal:   Goal/Amount of Fluid to Remove (mL): 2500 mL (03/29/21 1230)   Access     Type & Location:   Left upper arm AV Graft without evidence of warmth, redness, or drainage. +thrill/+bruit. Each access site disinfected for 60 seconds per site with alcohol swabs per P&P. Cannulated with 15G needles x2 and secured with paper tape. +aspiration/+flushed.      Labs     Obtained/Reviewed   Critical Results Called   Date when labs were drawn-  Hgb-    HGB   Date Value Ref Range Status   03/29/2021 7.7 (L) 12.1 - 17.0 g/dL Final     K-    Potassium   Date Value Ref Range Status   03/29/2021 5.2 (H) 3.5 - 5.1 mmol/L Final     Ca-   Calcium   Date Value Ref Range Status   03/29/2021 9.4 8.5 - 10.1 MG/DL Final     Bun-   BUN   Date Value Ref Range Status   03/29/2021 65 (H) 6 - 20 MG/DL Final     Creat-   Creatinine   Date Value Ref Range Status   03/29/2021 10.10 (H) 0.70 - 1.30 MG/DL Final        Medications/ Blood Products Given     Name   Dose   Route and Time     none                Blood Volume Processed (BVP):    88.5 L Net Fluid   Removed:  2500 mL   Comments   Time Out Done: 1252  Primary Nurse Rpt Pre: Duyen Franklin RN  Primary Nurse Rpt Post: Aidan Miller Alexy RN  Pt Education: infection control / procedural  Care Plan: continue current HD plan of care  Tx Summary:  2676 HD treatment initiated per physicians order. 1600 HD treatment completed per physician order. All possible blood returned to patient. Hemostasis achieved in <10 minutes. Access site dressings clean, dry, and intact. +thrill. Admiting Diagnosis: Gram (+) bacteremia  Pt's previous clinic- [facility]  Consent signed - Informed Consent Verified: Yes (03/29/21 1230)  Winter Consent - obtained  Hepatitis Status- 2/10/21 neg/imm  Machine #- Machine Number: [id number] (03/29/21 1230)  Telemetry status- n/a  Pre-dialysis wt. -

## 2021-03-29 NOTE — PROGRESS NOTES
Celsa sent x2 regarding pain medication orders for pt. Call from dialysis nurse stating a doctor came to see pt in dialysis and stated he would be putting in orders for pain medication. Still no orders at this time. 5- Perfectserve sent to MD Mariaa Walker regarding patient still requesting pain medication. [phone] message sent to pharmacy previously asking them to verify med as pt is in pain. They responded saying the med has to be sent up.

## 2021-03-29 NOTE — CONSULTS
Infectious Disease Consult Note      Reason for Consult: Recurrent CoNS bacteremia   Date of Consultation: March 29, 2021  Date of Admission: 3/29/2021  Referring Physician: Dr. Sirena Barbosa      HPI:  Brennan Lopes is a 39y.o. year old male with history of ESRD on iHD since 2012, 2 failed renal transplants due to graft-medication non-compliance (last transplant in 2001) CAD with MI with 2 AICD placements (2009 - non-functioning due to fractured lead, then in 2019), MSSA bacteremia in 2017, MRSE bacteremia 10/2020 and possible L1-L2 discitis/osteomyelitis treated with 6 weeks of vancomycin. Patient presented to the ED on 3/27, for acute onset of malaise, chills, multiple fevers of 101F, myalgias and increase in the mid-back pain, that started 3/23. WBC 12.7, CXR negative. Blood cultures done and he was sent home. 4/4 bottles peripheral came positive for CoNS and hence patient was called to be admitted on 3/29. In the hospital so far, patient has been afebrile and HDS. WBC was 17.8. Started on empiric vancomycin. Patient had a left arm AV graft placed in 09/2020 and is now being used for his dialysis. It had clotting issues about 2 weeks ago and needed balloon intervention. He denies any swelling, warmth or pain at the graft site, the pacemaker site. However, he reports that the back pain, now more in the mid-back is worse than prior. He denies any numbness tingling. Past Medical History:  Past Medical History:   Diagnosis Date    Abdominal hematoma     AICD (automatic cardioverter/defibrillator) present     CAD (coronary artery disease)     anterior MI s/p 2 stents  2007    Chronic abdominal pain     Chronic kidney disease     ESRD; Dialysis dependent.  Home [facility] does labs- [facility] tpke    DVT (deep venous thrombosis) ([facility] [address] 2001    DVT of popliteal vein ([facility] [address] 11/2011    not anticoagulated due to bleeding, IVC filter    Endocarditis     Gallstone pancreatitis     Gastrointestinal disorder acid reflux    Gastrointestinal disorder     peptic ulcer    Hemodialysis patient ([facility] [address]     High cholesterol     Hypertension     Kidney transplant     b/l kidneys 1995, 2001    Long term current use of anticoagulant therapy     Nephrotic syndrome     Other ill-defined conditions(799.89)     kidny transplant x2,  on dialysis    Other ill-defined conditions(799.89)     home dialysis    Other ill-defined conditions(799.89)     hx recurrent left leg DVT    Peritonitis ([facility] [address]     Seizures ([facility] [address] 2015    most recent- only had three in lifetime    Small bowel obstruction (HCC)     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[facility] [address]     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    [facility])          Surgical History:  Past Surgical History:   Procedure Laterality Date    CARDIAC SURG PROCEDURE UNLIST  2007    2 stents    HX APPENDECTOMY      HX CHOLECYSTECTOMY      HX OTHER SURGICAL      cholecystectomy    HX OTHER SURGICAL  11/2011    exploratory laparotomy    HX OTHER SURGICAL      fem-pop    HX OTHER SURGICAL  02/2013    right upper extremity fistula    HX PACEMAKER Left 6/2009    AICD-st latonya-not connected    HX PACEMAKER Left     AICD-left side-BS-working    HX SMALL BOWEL RESECTION      HX TRANSPLANT  2001     Kidney    HX TRANSPLANT  1992    kidney    HX UROLOGICAL      2 kidney transplants    HX VASCULAR ACCESS Right     femoral access for HD- does 5 day dialysis @ home    IR REMOVE TUNL CVAD W/O PORT / PUMP  10/29/2020    IR REPLACE CVC TUNNELED W/O PORT  9/10/2020    NJ EDG US EXAM SURGICAL ALTER STOM DUODENUM/JEJUNUM  7/15/2011         NJ ESOPHAGOGASTRODUODENOSCOPY TRANSORAL DIAGNOSTIC  5/24/2012         VASCULAR SURGERY PROCEDURE UNLIST  11/14/2017    Insertion AV graft right upper arm (bovine); ligation of AV fistula right arm         Family History:   Family History   Problem Relation Age of Onset    COPD Mother     Lung Disease Mother     Cancer Father         stomach    Cancer Maternal Grandmother Social History:     · Lives with her sister at home. Non-smoker but exposed to a lot of secondhand smoking at home in childhood. Allergies: Allergies   Allergen Reactions    Shellfish Containing Products Swelling     Break out in rash, trouble breathing    Contrast Agent [Iodine] Shortness of Breath    Heparin Analogues Other (comments)     Positive history of HIT         Review of Systems:    See HPI    Medications:  No current facility-administered medications on file prior to encounter. Current Outpatient Medications on File Prior to Encounter   Medication Sig Dispense Refill    ondansetron (Zofran ODT) 4 mg disintegrating tablet 1 Tab by SubLINGual route every eight (8) hours as needed for Nausea or Vomiting. 20 Tab 0    oxyCODONE-acetaminophen (Percocet) 5-325 mg per tablet Take 1 Tab by mouth every eight (8) hours as needed for Pain for up to 3 days. Max Daily Amount: 3 Tabs. Indications: pain 10 Tab 0    metoprolol succinate (TOPROL-XL) 25 mg XL tablet Take 0.5 Tabs by mouth daily. 30 Tab 0    calcium acetate,phosphat bind, (PHOSLO) 667 mg cap Take 2 Caps by mouth three (3) times daily (with meals). Indications: low amount of calcium in the blood, renal osteodystrophy with hyperphosphatemia 120 Cap 0    atorvastatin (Lipitor) 20 mg tablet Take 20 mg by mouth daily.  warfarin (COUMADIN) 2.5 mg tablet Take 2.5 mg by mouth daily.  acetaminophen (TYLENOL) 500 mg tablet Take 1 Tab by mouth every four (4) hours as needed for Pain. Over the counter 30 Tab 0    omeprazole (PRILOSEC) 20 mg capsule Take 20 mg by mouth daily.  calcitRIOL (ROCALTROL) 0.5 mcg capsule Take 0.5 mcg by mouth daily.  aspirin 81 mg chewable tablet Take 81 mg by mouth daily.              Physical Exam:    Vitals:   Patient Vitals for the past 24 hrs:   Temp Pulse Resp BP SpO2   03/29/21 0854 98.7 °F (37.1 °C) 90 16 123/72 100 %   03/29/21 0630 98.2 °F (36.8 °C) 93 20 113/74 98 %   03/29/21 0600  89 14 116/78 100 %   03/29/21 0540  90 14 124/80 100 %   03/29/21 0357 98.1 °F (36.7 °C) 89 18 (!) 140/85 99 %   ·   · GEN: NAD  · HEENT: Normocephalic, atraumatic, PERRL, no scleral icterus  · CV: S1, S2 heard regularly, no murmurs, + thrill at the AV graft left arm, pacemaker sites appear benign.    · Lungs: Clear to auscultation bilaterally  · Abdomen: soft, non distended, non tende  · Genitourinary: no oneal  · Extremities: no edema  · Neuro: Alert, oriented to time, place and situation, moves all extremities to commands, verbal   · Skin: no rash  · Psych: good affect, good eye contact, non tearful   · Lines: left arm AV graft      Labs:   Recent Results (from the past 24 hour(s))   EKG, 12 LEAD, INITIAL    Collection Time: 03/29/21  4:13 AM   Result Value Ref Range    Ventricular Rate 82 BPM    Atrial Rate 82 BPM    P-R Interval 166 ms    QRS Duration 110 ms    Q-T Interval 416 ms    QTC Calculation (Bezet) 486 ms    Calculated P Axis 66 degrees    Calculated R Axis 68 degrees    Calculated T Axis 173 degrees    Diagnosis       Normal sinus rhythm  T wave abnormality, consider inferior ischemia  Prolonged QT  When compared with ECG of 27-MAR-2021 03:25,  premature ventricular complexes are no longer present  Confirmed by Angela Murray M.D. ([id number]) on 3/29/2021 8:38:55 AM     TROPONIN I    Collection Time: 03/29/21  5:06 AM   Result Value Ref Range    Troponin-I, Qt. <0.05 <0.05 ng/mL   CBC WITH AUTOMATED DIFF    Collection Time: 03/29/21  5:06 AM   Result Value Ref Range    WBC 17.8 (H) 4.1 - 11.1 K/uL    RBC 2.45 (L) 4.10 - 5.70 M/uL    HGB 7.7 (L) 12.1 - 17.0 g/dL    HCT 23.0 (L) 36.6 - 50.3 %    MCV 93.9 80.0 - 99.0 FL    MCH 31.4 26.0 - 34.0 PG    MCHC 33.5 30.0 - 36.5 g/dL    RDW 16.5 (H) 11.5 - 14.5 %    PLATELET 574 233 - 416 K/uL    MPV 10.5 8.9 - 12.9 FL    NRBC 0.0 0  WBC    ABSOLUTE NRBC 0.00 0.00 - 0.01 K/uL    NEUTROPHILS 80 (H) 32 - 75 %    LYMPHOCYTES 6 (L) 12 - 49 %    MONOCYTES 10 5 - 13 %    EOSINOPHILS 2 0 - 7 %    BASOPHILS 1 0 - 1 %    IMMATURE GRANULOCYTES 1 (H) 0.0 - 0.5 %    ABS. NEUTROPHILS 14.4 (H) 1.8 - 8.0 K/UL    ABS. LYMPHOCYTES 1.1 0.8 - 3.5 K/UL    ABS. MONOCYTES 1.7 (H) 0.0 - 1.0 K/UL    ABS. EOSINOPHILS 0.4 0.0 - 0.4 K/UL    ABS. BASOPHILS 0.1 0.0 - 0.1 K/UL    ABS. IMM. GRANS. 0.1 (H) 0.00 - 0.04 K/UL    DF AUTOMATED     METABOLIC PANEL, COMPREHENSIVE    Collection Time: 03/29/21  5:06 AM   Result Value Ref Range    Sodium 128 (L) 136 - 145 mmol/L    Potassium 5.2 (H) 3.5 - 5.1 mmol/L    Chloride 89 (L) 97 - 108 mmol/L    CO2 20 (L) 21 - 32 mmol/L    Anion gap 19 (H) 5 - 15 mmol/L    Glucose 78 65 - 100 mg/dL    BUN 65 (H) 6 - 20 MG/DL    Creatinine 10.10 (H) 0.70 - 1.30 MG/DL    BUN/Creatinine ratio 6 (L) 12 - 20      GFR est AA 7 (L) >60 ml/min/1.73m2    GFR est non-AA 6 (L) >60 ml/min/1.73m2    Calcium 9.4 8.5 - 10.1 MG/DL    Bilirubin, total 0.6 0.2 - 1.0 MG/DL    ALT (SGPT) 14 12 - 78 U/L    AST (SGOT) 9 (L) 15 - 37 U/L    Alk. phosphatase 148 (H) 45 - 117 U/L    Protein, total 8.6 (H) 6.4 - 8.2 g/dL    Albumin 3.4 (L) 3.5 - 5.0 g/dL    Globulin 5.2 (H) 2.0 - 4.0 g/dL    A-G Ratio 0.7 (L) 1.1 - 2.2     PROTHROMBIN TIME + INR    Collection Time: 03/29/21  5:06 AM   Result Value Ref Range    INR 1.7 (H) 0.9 - 1.1      Prothrombin time 16.9 (H) 9.0 - 11.1 sec   POC LACTIC ACID    Collection Time: 03/29/21  5:34 AM   Result Value Ref Range    Lactic Acid (POC) 0.80 0.40 - 2.00 mmol/L   SAMPLES BEING HELD    Collection Time: 03/29/21  5:37 AM   Result Value Ref Range    SAMPLES BEING HELD  BLUE, RED, SST, PST     COMMENT        Add-on orders for these samples will be processed based on acceptable specimen integrity and analyte stability, which may vary by analyte.    SAMPLES BEING HELD    Collection Time: 03/29/21  5:37 AM   Result Value Ref Range    SAMPLES BEING HELD  LAV     COMMENT        Add-on orders for these samples will be processed based on acceptable specimen integrity and analyte stability, which may vary by analyte. Microbiology Data:     See HPI      Assessment:   38 yo M with:      - Sepsis due to CoNS bacteremia this admission. Unknown source but possible related to he multiple declotting procedures done a few weeks ago. Potential deep infected sites include the AICD and the back. - Nephrotic syndome s/p 2 renal transplants. Both transplants failed due to graft-medication non-compliance (last transplant in 2001). - ESRD on iHD, 5 times a week from home since 2012. In 09/2020 had left AV graft placed since complicated by clotting issues. - CAD with MI with 2 AICD placements (2009 - non-functioning due to fractured lead, then in 2019): patient has both devices in him. -  MSSA bacteremia in 2017  - MRSE bacteremia in 10/2020 likely femoral HD line-related and possible L1-L2 discitis/osteomyelitis treated with 6 weeks of vancomycin. Since 11/2020, the graft is being used for dialysis. Recommendations:  - F/u speciation and susceptibilities for the CoNS.   - Repeat BCx obtained today. - Will need TTE and EVER given AICD. - Patient reports mid-back pain. Was unable to obtain MRI due to AICD and also has contrast allergy (reports feeling SOB). Hence please obtain CT thoracic and lumbar spine without contrast for evaluation. Suspicion for infectious involvement is high. - Continue empiric vancomycin goal trough 15-20. Will follow. Thank for the opportunity to participate in the care of this patient. Please contact with questions or concerns.            Annabel Jasso MD  Infectious Diseases

## 2021-03-29 NOTE — H&P
Hospitalist Admission Note    NAME: Mica Ling   :  1975   MRN:  [id number]     Date/Time:  3/29/2021 6:04 AM    Patient PCP: Luis Ring MD  ______________________________________________________________________  Given the patient's current clinical presentation, I have a high level of concern for decompensation if discharged from the emergency department. Complex decision making was performed, which includes reviewing the patient's available past medical records, laboratory results, and x-ray films. My assessment of this patient's clinical condition and my plan of care is as follows. Assessment / Plan:    Gram-positive bacteremia  Leukocytosis  History of gram-positive bacteremia in 2020. History of endocarditis  Positive gram-positive culture in all 4 bottles 3/27. Source unknown. WBC 17.8, afebrile. Started on vancomycin, order blood culture. Echo pending. Infectious disease consulted. End-stage renal disease on dialysis  nephrology consulted. Hyponatremia  Hyperkalemia  Correction with dialysis. Nephrology on board. GERD  Hypertension  History of DVT  History of CAD with AICD placement  Hyperlipidemia  Continue the home medications. Code Status: Full code  Surrogate Decision Maker:     DVT Prophylaxis: Coumadin  GI Prophylaxis: not indicated    Baseline: Ambulatory at home      Subjective:   CHIEF COMPLAINT: Positive blood culture    HISTORY OF PRESENT ILLNESS:     Courtney Alston is a 39 y.o.  male who presents with with a positive blood culture for gram-positive bacteremia. Patient stated that since Tuesday he is having some body ache and myalgia along with low-grade fever. Patient was not feeling good overall. Patient also reported shortness of breath. Patient came to the ER on 3/27 and has a blood culture done at that time.   Today blood culture came back positive for gram-positive bacteremia in all 4 bottles. Patient got a call and now he came to the ER for further evaluation. Similar episode in November 2020 when he was on IV vancomycin for 3 weeks. Patient was doing fine after completing antibiotic course and no more till now. Patient to home dialysis 5 days a week and last dialysis was on Saturday. Denies any chest pain, no nausea vomiting, no constipation diarrhea, no abdominal pain, no skin tear, no recent injury, no history of sick contacts. We were asked to admit for work up and evaluation of the above problems. Past Medical History:   Diagnosis Date    Abdominal hematoma     AICD (automatic cardioverter/defibrillator) present     CAD (coronary artery disease)     anterior MI s/p 2 stents  2007    Chronic abdominal pain     Chronic kidney disease     ESRD; Dialysis dependent.  Home [facility] does labs- [facility] tpke    DVT (deep venous thrombosis) ([address] 2001    DVT of popliteal vein ([address] 11/2011    not anticoagulated due to bleeding, IVC filter    Endocarditis     Gallstone pancreatitis     Gastrointestinal disorder     acid reflux    Gastrointestinal disorder     peptic ulcer    Hemodialysis patient ([address]     High cholesterol     Hypertension     Kidney transplant     b/l kidneys 1995, 2001    Long term current use of anticoagulant therapy     Nephrotic syndrome     Other ill-defined conditions(799.89)     kidny transplant x2,  on dialysis    Other ill-defined conditions(799.89)     home dialysis    Other ill-defined conditions(799.89)     hx recurrent left leg DVT    Peritonitis ([address]     Seizures ([address] 2015    most recent- only had three in lifetime    Small bowel obstruction (HCC)     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    [facility])         Past Surgical History:   Procedure Laterality Date    CARDIAC SURG PROCEDURE UNLIST  2007    2 stents    HX APPENDECTOMY      HX CHOLECYSTECTOMY      HX OTHER SURGICAL      cholecystectomy    HX OTHER SURGICAL  11/2011    exploratory laparotomy    HX OTHER SURGICAL      fem-pop    HX OTHER SURGICAL  02/2013    right upper extremity fistula    HX PACEMAKER Left 6/2009    AICD-st latonya-not connected    HX PACEMAKER Left     AICD-left side-BS-working    HX SMALL BOWEL RESECTION      HX TRANSPLANT  2001     Kidney    HX TRANSPLANT  1992    kidney    HX UROLOGICAL      2 kidney transplants    HX VASCULAR ACCESS Right     femoral access for HD- does 5 day dialysis @ home    IR REMOVE TUNL CVAD W/O PORT / PUMP  10/29/2020    IR REPLACE CVC TUNNELED W/O PORT  9/10/2020    CO EDG US EXAM SURGICAL ALTER STOM DUODENUM/JEJUNUM  7/15/2011         CO ESOPHAGOGASTRODUODENOSCOPY TRANSORAL DIAGNOSTIC  5/24/2012         VASCULAR SURGERY PROCEDURE UNLIST  11/14/2017    Insertion AV graft right upper arm (bovine); ligation of AV fistula right arm       Social History     Tobacco Use    Smoking status: Never Smoker    Smokeless tobacco: Never Used   Substance Use Topics    Alcohol use: No        Family History   Problem Relation Age of Onset    COPD Mother     Lung Disease Mother     Cancer Father         stomach    Cancer Maternal Grandmother      Allergies   Allergen Reactions    Shellfish Containing Products Swelling     Break out in rash, trouble breathing    Contrast Agent [Iodine] Shortness of Breath    Heparin Analogues Other (comments)     Positive history of HIT        Prior to Admission medications    Medication Sig Start Date End Date Taking? Authorizing Provider   ondansetron (Zofran ODT) 4 mg disintegrating tablet 1 Tab by SubLINGual route every eight (8) hours as needed for Nausea or Vomiting. 3/27/21   Rojelio Lewis MD   oxyCODONE-acetaminophen (Percocet) 5-325 mg per tablet Take 1 Tab by mouth every eight (8) hours as needed for Pain for up to 3 days. Max Daily Amount: 3 Tabs.  Indications: pain 3/27/21 3/30/21  Rojelio Lewis MD   metoprolol succinate (TOPROL-XL) 25 mg XL tablet Take 0.5 Tabs by mouth daily. 11/10/20   Margaret Wilson MD   calcium acetate,phosphat bind, (PHOSLO) 667 mg cap Take 2 Caps by mouth three (3) times daily (with meals). Indications: low amount of calcium in the blood, renal osteodystrophy with hyperphosphatemia 11/10/20   Margaret Wilson MD   atorvastatin (Lipitor) 20 mg tablet Take 20 mg by mouth daily. Provider, Historical   warfarin (COUMADIN) 2.5 mg tablet Take 2.5 mg by mouth daily. Provider, Historical   acetaminophen (TYLENOL) 500 mg tablet Take 1 Tab by mouth every four (4) hours as needed for Pain. Over the counter 1/20/19   Jonas Nascimento MD   omeprazole (PRILOSEC) 20 mg capsule Take 20 mg by mouth daily. Provider, Historical   calcitRIOL (ROCALTROL) 0.5 mcg capsule Take 0.5 mcg by mouth daily. Provider, Historical   aspirin 81 mg chewable tablet Take 81 mg by mouth daily. Other, MD Morro       REVIEW OF SYSTEMS:     I am not able to complete the review of systems because:    The patient is intubated and sedated    The patient has altered mental status due to his acute medical problems    The patient has baseline aphasia from prior stroke(s)    The patient has baseline dementia and is not reliable historian    The patient is in acute medical distress and unable to provide information           Total of 12 systems reviewed as follows:       POSITIVE= underlined text  Negative = text not underlined  General:  fever, chills, sweats, generalized weakness, weight loss/gain,      loss of appetite   Eyes:    blurred vision, eye pain, loss of vision, double vision  ENT:    rhinorrhea, pharyngitis   Respiratory:   cough, sputum production, SOB, ZAMUDIO, wheezing, pleuritic pain   Cardiology:   chest pain, palpitations, orthopnea, PND, edema, syncope   Gastrointestinal:  abdominal pain , N/V, diarrhea, dysphagia, constipation, bleeding   Genitourinary:  frequency, urgency, dysuria, hematuria, incontinence   Muskuloskeletal :  arthralgia, myalgia, back pain  Hematology:  easy bruising, nose or gum bleeding, lymphadenopathy   Dermatological: rash, ulceration, pruritis, color change / jaundice  Endocrine:   hot flashes or polydipsia   Neurological:  headache, dizziness, confusion, focal weakness, paresthesia,     Speech difficulties, memory loss, gait difficulty  Psychological: Feelings of anxiety, depression, agitation    Objective:   VITALS:    Visit Vitals  /78   Pulse 89   Temp 98.1 °F (36.7 °C)   Resp 14   Ht 5' 7\" (1.702 m)   Wt 66.3 kg (146 lb 2.6 oz)   SpO2 100%   BMI 22.89 kg/m²       PHYSICAL EXAM:    General:    Alert, cooperative, no distress, appears stated age. HEENT: Atraumatic, anicteric sclerae, pink conjunctivae     No oral ulcers, mucosa moist, throat clear, dentition fair  Neck:  Supple, symmetrical,  thyroid: non tender  Lungs:   Clear to auscultation bilaterally. No Wheezing or Rhonchi. No rales. Chest wall:  No tenderness  No Accessory muscle use. Heart:   Regular  rhythm,  No  murmur   No edema  Abdomen:   Soft, non-tender. Not distended. Bowel sounds normal  Extremities: No cyanosis. No clubbing,      Skin turgor normal, Capillary refill normal, Radial dial pulse 2+  Skin:     Not pale. Not Jaundiced  No rashes   Psych:  Good insight. Not depressed. Not anxious or agitated. Neurologic: EOMs intact. No facial asymmetry. No aphasia or slurred speech. Symmetrical strength, Sensation grossly intact.  Alert and oriented X 4.     _______________________________________________________________________  Care Plan discussed with:    Comments   Patient x    Family      RN x    Care Manager                    Consultant:  x    _______________________________________________________________________  Expected  Disposition:   Home with Family x   HH/PT/OT/RN    SNF/LTC    KARLI    ________________________________________________________________________  TOTAL TIME: 61  Minutes    Critical Care Provided     Minutes non procedure based Comments     Reviewed previous records   >50% of visit spent in counseling and coordination of care  Discussion with patient and/or family and questions answered       ________________________________________________________________________  Signed: Agustin Pedraza MD    Procedures: see electronic medical records for all procedures/Xrays and details which were not copied into this note but were reviewed prior to creation of Plan. LAB DATA REVIEWED:    Recent Results (from the past 24 hour(s))   EKG, 12 LEAD, INITIAL    Collection Time: 03/29/21  4:13 AM   Result Value Ref Range    Ventricular Rate 82 BPM    Atrial Rate 82 BPM    P-R Interval 166 ms    QRS Duration 110 ms    Q-T Interval 416 ms    QTC Calculation (Bezet) 486 ms    Calculated P Axis 66 degrees    Calculated R Axis 68 degrees    Calculated T Axis 173 degrees    Diagnosis       Normal sinus rhythm  T wave abnormality, consider inferior ischemia  Prolonged QT  When compared with ECG of 27-MAR-2021 03:25,  premature ventricular complexes are no longer present     TROPONIN I    Collection Time: 03/29/21  5:06 AM   Result Value Ref Range    Troponin-I, Qt. <0.05 <0.05 ng/mL   CBC WITH AUTOMATED DIFF    Collection Time: 03/29/21  5:06 AM   Result Value Ref Range    WBC 17.8 (H) 4.1 - 11.1 K/uL    RBC 2.45 (L) 4.10 - 5.70 M/uL    HGB 7.7 (L) 12.1 - 17.0 g/dL    HCT 23.0 (L) 36.6 - 50.3 %    MCV 93.9 80.0 - 99.0 FL    MCH 31.4 26.0 - 34.0 PG    MCHC 33.5 30.0 - 36.5 g/dL    RDW 16.5 (H) 11.5 - 14.5 %    PLATELET 227 771 - 341 K/uL    MPV 10.5 8.9 - 12.9 FL    NRBC 0.0 0  WBC    ABSOLUTE NRBC 0.00 0.00 - 0.01 K/uL    NEUTROPHILS 80 (H) 32 - 75 %    LYMPHOCYTES 6 (L) 12 - 49 %    MONOCYTES 10 5 - 13 %    EOSINOPHILS 2 0 - 7 %    BASOPHILS 1 0 - 1 %    IMMATURE GRANULOCYTES 1 (H) 0.0 - 0.5 %    ABS. NEUTROPHILS 14.4 (H) 1.8 - 8.0 K/UL    ABS. LYMPHOCYTES 1.1 0.8 - 3.5 K/UL    ABS. MONOCYTES 1.7 (H) 0.0 - 1.0 K/UL    ABS.  EOSINOPHILS 0.4 0.0 - 0.4 K/UL ABS. BASOPHILS 0.1 0.0 - 0.1 K/UL    ABS. IMM. GRANS. 0.1 (H) 0.00 - 0.04 K/UL    DF AUTOMATED     METABOLIC PANEL, COMPREHENSIVE    Collection Time: 03/29/21  5:06 AM   Result Value Ref Range    Sodium 128 (L) 136 - 145 mmol/L    Potassium 5.2 (H) 3.5 - 5.1 mmol/L    Chloride 89 (L) 97 - 108 mmol/L    CO2 20 (L) 21 - 32 mmol/L    Anion gap 19 (H) 5 - 15 mmol/L    Glucose 78 65 - 100 mg/dL    BUN 65 (H) 6 - 20 MG/DL    Creatinine 10.10 (H) 0.70 - 1.30 MG/DL    BUN/Creatinine ratio 6 (L) 12 - 20      GFR est AA 7 (L) >60 ml/min/1.73m2    GFR est non-AA 6 (L) >60 ml/min/1.73m2    Calcium 9.4 8.5 - 10.1 MG/DL    Bilirubin, total 0.6 0.2 - 1.0 MG/DL    ALT (SGPT) 14 12 - 78 U/L    AST (SGOT) 9 (L) 15 - 37 U/L    Alk. phosphatase 148 (H) 45 - 117 U/L    Protein, total 8.6 (H) 6.4 - 8.2 g/dL    Albumin 3.4 (L) 3.5 - 5.0 g/dL    Globulin 5.2 (H) 2.0 - 4.0 g/dL    A-G Ratio 0.7 (L) 1.1 - 2.2     PROTHROMBIN TIME + INR    Collection Time: 03/29/21  5:06 AM   Result Value Ref Range    INR 1.7 (H) 0.9 - 1.1      Prothrombin time 16.9 (H) 9.0 - 11.1 sec   POC LACTIC ACID    Collection Time: 03/29/21  5:34 AM   Result Value Ref Range    Lactic Acid (POC) 0.80 0.40 - 2.00 mmol/L   SAMPLES BEING HELD    Collection Time: 03/29/21  5:37 AM   Result Value Ref Range    SAMPLES BEING HELD  BLUE, RED, SST, PST     COMMENT        Add-on orders for these samples will be processed based on acceptable specimen integrity and analyte stability, which may vary by analyte. SAMPLES BEING HELD    Collection Time: 03/29/21  5:37 AM   Result Value Ref Range    SAMPLES BEING HELD  LAV     COMMENT        Add-on orders for these samples will be processed based on acceptable specimen integrity and analyte stability, which may vary by analyte.

## 2021-03-29 NOTE — ED NOTES
Pt presents ambulatory to ed due to receiving report that his blood cultures that were drawn on the 27th came back positive. He reports he has continued to feel poorly with HA, fever, generalized body aches, and nausea. Pt reports he is a home dialysis pt with his last session on Saturday. He reports he has noticed no abnormalities with his fistula site on his left arm. Pt reports he has taken tylenol for his sx. Pt reports he no longer makes urine. Pt notes mild sob but states this is baseline for him. Pt denies cp, change in bm, blurry vision, numbness/tingling. 4:44 AM: delay in obtaining blood work at pt difficult stick    7:17 AM: Attempted to call report at this time. 7:35 AM: TRANSFER - OUT REPORT:    Verbal report given to Maryellen Fiore RN(name) on Samara Felix  being transferred to Gen Surg(unit) for routine progression of care       Report consisted of patients Situation, Background, Assessment and   Recommendations(SBAR). Information from the following report(s) SBAR, Kardex, ED Summary, [facility] and Recent Results was reviewed with the receiving nurse. Lines:   Peripheral IV 03/29/21 Right Forearm (Active)        Opportunity for questions and clarification was provided.       Patient transported with:   [employer]

## 2021-03-29 NOTE — ED PROVIDER NOTES
EMERGENCY DEPARTMENT HISTORY AND PHYSICAL EXAM      Date: 3/29/2021  Patient Name: Jhonny Mcginnis    History of Presenting Illness     Chief Complaint   Patient presents with    Other     ED visit d/t retutrned to ED for (+) blood cultures at home - dialysis patient, (L) arm graft, completed 5 treatment at home weekly - chills and bodyaches and fever, Tmax 102 at home -       History Provided By: Patient    HPI: Jhonny Mcginnis, 39 y.o. male with PMHx significant for end-stage renal disease on home dialysis 5 days a week, endocarditis, AICD, coronary artery disease status post stents, DVT, status post 2 failed kidney transplants, hyperlipidemia, hypertension presents to the ED with chief complaint of headache and myalgias along with intermittent fevers for the past 6 days. Patient reports his symptoms are similar to when he had bacteremia in November 2020. Per medical record, patient was evaluated in the ED. He had lab work including blood cultures and a Covid swab. Covid test was negative. Blood cultures came back positive today with gram-positive cocci in clusters in all 4 tubes. Patient reports low-grade fevers to 101 at home. He takes Tylenol for pain without significant relief. Patient denies making urine anymore. He denies any cough. Denies nausea, vomiting or diarrhea. Patient accesses his own  left upper arm dialysis graft 5 times a week for home dialysis. In November when he had bacteremia, he had a femoral dialysis catheter that had been in for over 2 months. He had been told to have it removed, but had not. He does not currently have any indwelling catheters. He denies any changes to the skin overlying his dialysis graft. PCP: Yin Pabon MD    No current facility-administered medications on file prior to encounter.       Current Outpatient Medications on File Prior to Encounter   Medication Sig Dispense Refill    ondansetron (Zofran ODT) 4 mg disintegrating tablet 1 Tab by SubLINGual route every eight (8) hours as needed for Nausea or Vomiting. 20 Tab 0    oxyCODONE-acetaminophen (Percocet) 5-325 mg per tablet Take 1 Tab by mouth every eight (8) hours as needed for Pain for up to 3 days. Max Daily Amount: 3 Tabs. Indications: pain 10 Tab 0    metoprolol succinate (TOPROL-XL) 25 mg XL tablet Take 0.5 Tabs by mouth daily. 30 Tab 0    calcium acetate,phosphat bind, (PHOSLO) 667 mg cap Take 2 Caps by mouth three (3) times daily (with meals). Indications: low amount of calcium in the blood, renal osteodystrophy with hyperphosphatemia 120 Cap 0    atorvastatin (Lipitor) 20 mg tablet Take 20 mg by mouth daily.  warfarin (COUMADIN) 2.5 mg tablet Take 2.5 mg by mouth daily.  acetaminophen (TYLENOL) 500 mg tablet Take 1 Tab by mouth every four (4) hours as needed for Pain. Over the counter 30 Tab 0    omeprazole (PRILOSEC) 20 mg capsule Take 20 mg by mouth daily.  calcitRIOL (ROCALTROL) 0.5 mcg capsule Take 0.5 mcg by mouth daily.  aspirin 81 mg chewable tablet Take 81 mg by mouth daily. Past History     Past Medical History:  Past Medical History:   Diagnosis Date    Abdominal hematoma     AICD (automatic cardioverter/defibrillator) present     CAD (coronary artery disease)     anterior MI s/p 2 stents  2007    Chronic abdominal pain     Chronic kidney disease     ESRD; Dialysis dependent.  Home [facility] does labs- [facility] tpke    DVT (deep venous thrombosis) ([facility] [address] 2001    DVT of popliteal vein ([facility] [address] 11/2011    not anticoagulated due to bleeding, IVC filter    Endocarditis     Gallstone pancreatitis     Gastrointestinal disorder     acid reflux    Gastrointestinal disorder     peptic ulcer    Hemodialysis patient ([facility] [address]     High cholesterol     Hypertension     Kidney transplant     b/l kidneys 1995, 2001    Long term current use of anticoagulant therapy     Nephrotic syndrome     Other ill-defined conditions(799.89)     kidny transplant x2,  on dialysis    Other ill-defined conditions(799.89)     home dialysis    Other ill-defined conditions(799.89)     hx recurrent left leg DVT    Peritonitis ([facility] [address]     Seizures ([facility] [address] 2015    most recent- only had three in lifetime    Small bowel obstruction (HCC)     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    V-[facility])        Past Surgical History:  Past Surgical History:   Procedure Laterality Date    CARDIAC SURG PROCEDURE UNLIST  2007    2 stents    HX APPENDECTOMY      HX CHOLECYSTECTOMY      HX OTHER SURGICAL      cholecystectomy    HX OTHER SURGICAL  11/2011    exploratory laparotomy    HX OTHER SURGICAL      fem-pop    HX OTHER SURGICAL  02/2013    right upper extremity fistula    HX PACEMAKER Left 6/2009    AICD-st latonya-not connected    HX PACEMAKER Left     AICD-left side-BS-working    HX SMALL BOWEL RESECTION      HX TRANSPLANT  2001     Kidney    HX TRANSPLANT  1992    kidney    HX UROLOGICAL      2 kidney transplants    HX VASCULAR ACCESS Right     femoral access for HD- does 5 day dialysis @ home    IR REMOVE TUNL CVAD W/O PORT / PUMP  10/29/2020    IR REPLACE CVC TUNNELED W/O PORT  9/10/2020    SC EDG US EXAM SURGICAL ALTER STOM DUODENUM/JEJUNUM  7/15/2011         SC ESOPHAGOGASTRODUODENOSCOPY TRANSORAL DIAGNOSTIC  5/24/2012         VASCULAR SURGERY PROCEDURE UNLIST  11/14/2017    Insertion AV graft right upper arm (bovine); ligation of AV fistula right arm       Family History:  Family History   Problem Relation Age of Onset    COPD Mother     Lung Disease Mother     Cancer Father         stomach    Cancer Maternal Grandmother        Social History:  Social History     Tobacco Use    Smoking status: Never Smoker    Smokeless tobacco: Never Used   Substance Use Topics    Alcohol use: No    Drug use: Yes     Types: Prescription, OTC       Allergies:   Allergies   Allergen Reactions    Shellfish Containing Products Swelling     Break out in rash, trouble breathing    Contrast Agent [Iodine] Shortness of Breath    Heparin Analogues Other (comments)     Positive history of HIT         Review of Systems   Review of Systems   Constitutional: Positive for fatigue and fever. Negative for chills. HENT: Negative for congestion, ear pain, sinus pressure and sore throat. Respiratory: Negative for cough and chest tightness. Cardiovascular: Negative for chest pain, palpitations and leg swelling. Gastrointestinal: Positive for nausea. Negative for abdominal pain, diarrhea and vomiting. Genitourinary: Negative for dysuria, flank pain and hematuria. Musculoskeletal: Positive for back pain and myalgias. Negative for neck pain. Skin: Negative for rash and wound. Neurological: Negative for dizziness, syncope, light-headedness and headaches. Psychiatric/Behavioral: Negative for confusion. The patient is not nervous/anxious. All other systems reviewed and are negative.         Physical Exam    General appearance -thin, chronically ill-appearing, and in no distress  Eyes - pupils equal and reactive, extraocular eye movements intact  ENT - mucous membranes moist, pharynx normal without lesions  Neck - supple, no significant adenopathy; non-tender to palpation  Chest - clear to auscultation, no wheezes, rales or rhonchi; non-tender to palpation  Heart - normal rate and regular rhythm, S1 and S2 normal, no murmurs noted  Abdomen - soft, nontender, nondistended, no masses or organomegaly  Musculoskeletal - no joint tenderness, deformity or swelling; normal ROM  Extremities - peripheral pulses normal, no pedal edema, AV graft in left upper arm with positive thrill  Skin - normal coloration and turgor, no rashes  Neurological - alert, oriented x3, normal speech, no focal findings or movement disorder noted    Diagnostic Study Results     Labs -     Recent Results (from the past 12 hour(s))   EKG, 12 LEAD, INITIAL    Collection Time: 03/29/21  4:13 AM   Result Value Ref Range    Ventricular Rate 82 BPM    Atrial Rate 82 BPM    P-R Interval 166 ms    QRS Duration 110 ms    Q-T Interval 416 ms    QTC Calculation (Bezet) 486 ms    Calculated P Axis 66 degrees    Calculated R Axis 68 degrees    Calculated T Axis 173 degrees    Diagnosis       Normal sinus rhythm  T wave abnormality, consider inferior ischemia  Prolonged QT  When compared with ECG of 27-MAR-2021 03:25,  premature ventricular complexes are no longer present     TROPONIN I    Collection Time: 03/29/21  5:06 AM   Result Value Ref Range    Troponin-I, Qt. <0.05 <0.05 ng/mL   CBC WITH AUTOMATED DIFF    Collection Time: 03/29/21  5:06 AM   Result Value Ref Range    WBC 17.8 (H) 4.1 - 11.1 K/uL    RBC 2.45 (L) 4.10 - 5.70 M/uL    HGB 7.7 (L) 12.1 - 17.0 g/dL    HCT 23.0 (L) 36.6 - 50.3 %    MCV 93.9 80.0 - 99.0 FL    MCH 31.4 26.0 - 34.0 PG    MCHC 33.5 30.0 - 36.5 g/dL    RDW 16.5 (H) 11.5 - 14.5 %    PLATELET 988 516 - 550 K/uL    MPV 10.5 8.9 - 12.9 FL    NRBC 0.0 0  WBC    ABSOLUTE NRBC 0.00 0.00 - 0.01 K/uL    NEUTROPHILS 80 (H) 32 - 75 %    LYMPHOCYTES 6 (L) 12 - 49 %    MONOCYTES 10 5 - 13 %    EOSINOPHILS 2 0 - 7 %    BASOPHILS 1 0 - 1 %    IMMATURE GRANULOCYTES 1 (H) 0.0 - 0.5 %    ABS. NEUTROPHILS 14.4 (H) 1.8 - 8.0 K/UL    ABS. LYMPHOCYTES 1.1 0.8 - 3.5 K/UL    ABS. MONOCYTES 1.7 (H) 0.0 - 1.0 K/UL    ABS. EOSINOPHILS 0.4 0.0 - 0.4 K/UL    ABS. BASOPHILS 0.1 0.0 - 0.1 K/UL    ABS. IMM.  GRANS. 0.1 (H) 0.00 - 0.04 K/UL    DF AUTOMATED     METABOLIC PANEL, COMPREHENSIVE    Collection Time: 03/29/21  5:06 AM   Result Value Ref Range    Sodium 128 (L) 136 - 145 mmol/L    Potassium 5.2 (H) 3.5 - 5.1 mmol/L    Chloride 89 (L) 97 - 108 mmol/L    CO2 20 (L) 21 - 32 mmol/L    Anion gap 19 (H) 5 - 15 mmol/L    Glucose 78 65 - 100 mg/dL    BUN 65 (H) 6 - 20 MG/DL    Creatinine 10.10 (H) 0.70 - 1.30 MG/DL    BUN/Creatinine ratio 6 (L) 12 - 20      GFR est AA 7 (L) >60 ml/min/1.73m2    GFR est non-AA 6 (L) >60 ml/min/1.73m2    Calcium 9.4 8.5 - 10.1 MG/DL    Bilirubin, total 0.6 0.2 - 1.0 MG/DL    ALT (SGPT) 14 12 - 78 U/L    AST (SGOT) 9 (L) 15 - 37 U/L    Alk. phosphatase 148 (H) 45 - 117 U/L    Protein, total 8.6 (H) 6.4 - 8.2 g/dL    Albumin 3.4 (L) 3.5 - 5.0 g/dL    Globulin 5.2 (H) 2.0 - 4.0 g/dL    A-G Ratio 0.7 (L) 1.1 - 2.2     PROTHROMBIN TIME + INR    Collection Time: 03/29/21  5:06 AM   Result Value Ref Range    INR 1.7 (H) 0.9 - 1.1      Prothrombin time 16.9 (H) 9.0 - 11.1 sec   POC LACTIC ACID    Collection Time: 03/29/21  5:34 AM   Result Value Ref Range    Lactic Acid (POC) 0.80 0.40 - 2.00 mmol/L   SAMPLES BEING HELD    Collection Time: 03/29/21  5:37 AM   Result Value Ref Range    SAMPLES BEING HELD  BLUE, RED, SST, PST     COMMENT        Add-on orders for these samples will be processed based on acceptable specimen integrity and analyte stability, which may vary by analyte. SAMPLES BEING HELD    Collection Time: 03/29/21  5:37 AM   Result Value Ref Range    SAMPLES BEING HELD  LAV     COMMENT        Add-on orders for these samples will be processed based on acceptable specimen integrity and analyte stability, which may vary by analyte. Radiologic Studies -   XR CHEST PORT   Final Result   No evidence of acute cardiopulmonary process. CT Results  (Last 48 hours)    None        CXR Results  (Last 48 hours)               03/29/21 0424  XR CHEST PORT Final result    Impression:  No evidence of acute cardiopulmonary process. Narrative:  INDICATION:  Chest pain        COMPARISON: March 27       FINDINGS: Single AP portable view of the chest obtained at 413 demonstrates a   stable cardiomediastinal silhouette. There is chronic cardiomegaly. There is a   left-sided pacer device and an additional electrode overlying the left lower   chest. There is chronic elevation of the right hemidiaphragm. The lungs are   clear bilaterally. No osseous abnormalities are seen. Medical Decision Making   I am the first provider for this patient. I reviewed the vital signs, available nursing notes, past medical history, past surgical history, family history and social history. Vital Signs-Reviewed the patient's vital signs. Patient Vitals for the past 12 hrs:   Temp Pulse Resp BP SpO2   03/29/21 0357 98.1 °F (36.7 °C) 89 18 (!) 140/85 99 %       EKG: Normal sinus rhythm, 82 bpm, normal axis, normal CO, QRS intervals, prolonged QTc interval, T wave inversions in inferior leads    Records Reviewed: Nursing Notes and Old Medical Records    Provider Notes (Medical Decision Making):   Patient presents with myalgias and general fatigue. Seen in the ED 2 days ago and had blood cultures which were positive for gram-positive cocci in all 4 bottles. Suspect bacteremia, differential diagnosis includes endocarditis, pneumonia, infection related to contamination at dialysis  We will check CBC, CMP, troponin, chest x-ray, will treat with vancomycin. ED Course:   Initial assessment performed. The patients presenting problems have been discussed, and they are in agreement with the care plan formulated and outlined with them. I have encouraged them to ask questions as they arise throughout their visit. Progress Notes:   Case discussed with hospitalist who will see and admit the patient. Disposition:  Admit to hospitalist        Diagnosis     Clinical Impression:   1. Bacteremia    2. ESRD on hemodialysis ([facility] [address]    3. Anemia in ESRD (end-stage renal disease) ([facility] [address]    4.  Hyponatremia

## 2021-03-29 NOTE — ED NOTES
0400 - pt arrived to ED for (+) blood culture results - orders sepsis protocol bloodwork for workup     Spoke with Marilyn DOMINGUEZ regarding case - MD is deferring this RN to call CODE Sepsis until the patient is assessed;;    0530 - IV placed with Ultrasound to (R) arm with one attempt;;

## 2021-03-29 NOTE — PROGRESS NOTES
End of Shift Note    Bedside shift change report given to Pamella (oncoming nurse) by Robert Willett (offgoing nurse). Report included the following information SBAR, Kardex, Procedure Summary and MAR    Shift worked:  7a-7p     Shift summary and any significant changes:    Patient rested in recliner for most of shift. Medicated for pain, see MAR. Patient states he does not produce urine and is scheduled for dialysis MWF. Orders for percocet received from MD Huddleston. Concerns for physician to address:      Zone phone for oncoming shift:  [phone]       Activity:  Activity Level: Up ad fouzia  Number times ambulated in hallways past shift: 0  Number of times OOB to chair past shift: 2    Cardiac:   Cardiac Monitoring: Yes      Cardiac Rhythm: Normal sinus rhythm    Access:   Current line(s): PIV and HD access     Genitourinary:   Urinary status: anuric    Respiratory:   O2 Device: Room air  Chronic home O2 use?: NO  Incentive spirometer at bedside: N/A     GI:     Current diet:  DIET RENAL Regular  Passing flatus: YES  Tolerating current diet: YES       Pain Management:   Patient states pain is manageable on current regimen: YES    Skin:     Interventions: float heels, increase time out of bed and limit briefs    Patient Safety:  Fall Score:  Total Score: 1  Interventions: assistive device (walker, cane, etc), gripper socks, pt to call before getting OOB and stay with me (per policy)       Length of Stay:  Expected LOS: - - -  Actual LOS: 0      Robert Willett

## 2021-03-29 NOTE — PROGRESS NOTES
Hx MRSE bacteremia 10/2020  S/P LUE AV graft   S/P AICD    Blood Cx from 3/27 preliminary coag neg staph,  Likely will need EVER to assess for endocarditis or cardiac device infection - will wait for ID input  Will ask Vascular surgery to look at his AV graft also

## 2021-03-30 ENCOUNTER — APPOINTMENT (OUTPATIENT)
Dept: CT IMAGING | Age: 46
DRG: 252 | End: 2021-03-30
Attending: STUDENT IN AN ORGANIZED HEALTH CARE EDUCATION/TRAINING PROGRAM
Payer: MEDICARE

## 2021-03-30 ENCOUNTER — APPOINTMENT (OUTPATIENT)
Dept: NON INVASIVE DIAGNOSTICS | Age: 46
DRG: 252 | End: 2021-03-30
Attending: STUDENT IN AN ORGANIZED HEALTH CARE EDUCATION/TRAINING PROGRAM
Payer: MEDICARE

## 2021-03-30 ENCOUNTER — APPOINTMENT (OUTPATIENT)
Dept: VASCULAR SURGERY | Age: 46
DRG: 252 | End: 2021-03-30
Attending: SURGERY
Payer: MEDICARE

## 2021-03-30 LAB
ANION GAP SERPL CALC-SCNC: 9 MMOL/L (ref 5–15)
BASOPHILS # BLD: 0.1 K/UL (ref 0–0.1)
BASOPHILS NFR BLD: 1 % (ref 0–1)
BUN SERPL-MCNC: 33 MG/DL (ref 6–20)
BUN/CREAT SERPL: 5 (ref 12–20)
CALCIUM SERPL-MCNC: 8.5 MG/DL (ref 8.5–10.1)
CHLORIDE SERPL-SCNC: 96 MMOL/L (ref 97–108)
CO2 SERPL-SCNC: 28 MMOL/L (ref 21–32)
CREAT SERPL-MCNC: 6.49 MG/DL (ref 0.7–1.3)
DIFFERENTIAL METHOD BLD: ABNORMAL
EOSINOPHIL # BLD: 0.4 K/UL (ref 0–0.4)
EOSINOPHIL NFR BLD: 3 % (ref 0–7)
ERYTHROCYTE [DISTWIDTH] IN BLOOD BY AUTOMATED COUNT: 16.8 % (ref 11.5–14.5)
GLUCOSE SERPL-MCNC: 87 MG/DL (ref 65–100)
HCT VFR BLD AUTO: 20.1 % (ref 36.6–50.3)
HGB BLD-MCNC: 6.8 G/DL (ref 12.1–17)
IMM GRANULOCYTES # BLD AUTO: 0.1 K/UL (ref 0–0.04)
IMM GRANULOCYTES NFR BLD AUTO: 1 % (ref 0–0.5)
INR PPP: 1.4 (ref 0.9–1.1)
LYMPHOCYTES # BLD: 1.2 K/UL (ref 0.8–3.5)
LYMPHOCYTES NFR BLD: 9 % (ref 12–49)
MAGNESIUM SERPL-MCNC: 2.2 MG/DL (ref 1.6–2.4)
MCH RBC QN AUTO: 31.8 PG (ref 26–34)
MCHC RBC AUTO-ENTMCNC: 33.8 G/DL (ref 30–36.5)
MCV RBC AUTO: 93.9 FL (ref 80–99)
MONOCYTES # BLD: 1.3 K/UL (ref 0–1)
MONOCYTES NFR BLD: 10 % (ref 5–13)
NEUTS SEG # BLD: 10 K/UL (ref 1.8–8)
NEUTS SEG NFR BLD: 77 % (ref 32–75)
NRBC # BLD: 0 K/UL (ref 0–0.01)
NRBC BLD-RTO: 0 PER 100 WBC
PLATELET # BLD AUTO: 258 K/UL (ref 150–400)
PMV BLD AUTO: 10.6 FL (ref 8.9–12.9)
POTASSIUM SERPL-SCNC: 4.2 MMOL/L (ref 3.5–5.1)
PROTHROMBIN TIME: 14.5 SEC (ref 9–11.1)
RBC # BLD AUTO: 2.14 M/UL (ref 4.1–5.7)
SODIUM SERPL-SCNC: 133 MMOL/L (ref 136–145)
WBC # BLD AUTO: 12.9 K/UL (ref 4.1–11.1)

## 2021-03-30 PROCEDURE — 80048 BASIC METABOLIC PNL TOTAL CA: CPT

## 2021-03-30 PROCEDURE — 74011250637 HC RX REV CODE- 250/637: Performed by: STUDENT IN AN ORGANIZED HEALTH CARE EDUCATION/TRAINING PROGRAM

## 2021-03-30 PROCEDURE — B24BZZ4 ULTRASONOGRAPHY OF HEART WITH AORTA, TRANSESOPHAGEAL: ICD-10-PCS | Performed by: INTERNAL MEDICINE

## 2021-03-30 PROCEDURE — 93990 DOPPLER FLOW TESTING: CPT

## 2021-03-30 PROCEDURE — 83735 ASSAY OF MAGNESIUM: CPT

## 2021-03-30 PROCEDURE — 99153 MOD SED SAME PHYS/QHP EA: CPT

## 2021-03-30 PROCEDURE — 99233 SBSQ HOSP IP/OBS HIGH 50: CPT | Performed by: STUDENT IN AN ORGANIZED HEALTH CARE EDUCATION/TRAINING PROGRAM

## 2021-03-30 PROCEDURE — 99152 MOD SED SAME PHYS/QHP 5/>YRS: CPT

## 2021-03-30 PROCEDURE — 74011250637 HC RX REV CODE- 250/637: Performed by: INTERNAL MEDICINE

## 2021-03-30 PROCEDURE — 72131 CT LUMBAR SPINE W/O DYE: CPT

## 2021-03-30 PROCEDURE — 74011000250 HC RX REV CODE- 250: Performed by: INTERNAL MEDICINE

## 2021-03-30 PROCEDURE — 93312 ECHO TRANSESOPHAGEAL: CPT

## 2021-03-30 PROCEDURE — 85610 PROTHROMBIN TIME: CPT

## 2021-03-30 PROCEDURE — 36415 COLL VENOUS BLD VENIPUNCTURE: CPT

## 2021-03-30 PROCEDURE — 87040 BLOOD CULTURE FOR BACTERIA: CPT

## 2021-03-30 PROCEDURE — 85025 COMPLETE CBC W/AUTO DIFF WBC: CPT

## 2021-03-30 PROCEDURE — 65270000029 HC RM PRIVATE

## 2021-03-30 PROCEDURE — 74011250636 HC RX REV CODE- 250/636: Performed by: INTERNAL MEDICINE

## 2021-03-30 RX ORDER — LIDOCAINE HYDROCHLORIDE 20 MG/ML
15 SOLUTION OROPHARYNGEAL AS NEEDED
Status: DISCONTINUED | OUTPATIENT
Start: 2021-03-30 | End: 2021-03-30

## 2021-03-30 RX ORDER — WARFARIN SODIUM 5 MG/1
5 TABLET ORAL
Status: COMPLETED | OUTPATIENT
Start: 2021-03-30 | End: 2021-03-30

## 2021-03-30 RX ORDER — FENTANYL CITRATE 50 UG/ML
12.5-5 INJECTION, SOLUTION INTRAMUSCULAR; INTRAVENOUS
Status: DISCONTINUED | OUTPATIENT
Start: 2021-03-30 | End: 2021-03-30

## 2021-03-30 RX ORDER — MIDAZOLAM HYDROCHLORIDE 1 MG/ML
.5-2 INJECTION, SOLUTION INTRAMUSCULAR; INTRAVENOUS
Status: DISCONTINUED | OUTPATIENT
Start: 2021-03-30 | End: 2021-03-30

## 2021-03-30 RX ORDER — MIDAZOLAM HYDROCHLORIDE 1 MG/ML
.5-2 INJECTION INTRAMUSCULAR; INTRAVENOUS
Status: DISCONTINUED | OUTPATIENT
Start: 2021-03-30 | End: 2021-03-30

## 2021-03-30 RX ADMIN — LIDOCAINE HYDROCHLORIDE 15 ML: 20 SOLUTION ORAL at 12:54

## 2021-03-30 RX ADMIN — OXYCODONE HYDROCHLORIDE AND ACETAMINOPHEN 1 TABLET: 5; 325 TABLET ORAL at 12:01

## 2021-03-30 RX ADMIN — MIDAZOLAM HYDROCHLORIDE 1 MG: 1 INJECTION, SOLUTION INTRAMUSCULAR; INTRAVENOUS at 13:20

## 2021-03-30 RX ADMIN — ASPIRIN 81 MG: 81 TABLET, CHEWABLE ORAL at 09:06

## 2021-03-30 RX ADMIN — ATORVASTATIN CALCIUM 20 MG: 20 TABLET, FILM COATED ORAL at 09:06

## 2021-03-30 RX ADMIN — Medication 10 ML: at 17:54

## 2021-03-30 RX ADMIN — METOPROLOL SUCCINATE 12.5 MG: 25 TABLET, EXTENDED RELEASE ORAL at 09:06

## 2021-03-30 RX ADMIN — FENTANYL CITRATE 25 MCG: 50 INJECTION, SOLUTION INTRAMUSCULAR; INTRAVENOUS at 13:14

## 2021-03-30 RX ADMIN — CALCIUM ACETATE 1334 MG: 667 CAPSULE ORAL at 09:06

## 2021-03-30 RX ADMIN — MIDAZOLAM HYDROCHLORIDE 1 MG: 1 INJECTION, SOLUTION INTRAMUSCULAR; INTRAVENOUS at 13:14

## 2021-03-30 RX ADMIN — CALCIUM ACETATE 1334 MG: 667 CAPSULE ORAL at 12:01

## 2021-03-30 RX ADMIN — Medication 10 ML: at 05:53

## 2021-03-30 RX ADMIN — CALCITRIOL CAPSULES 0.25 MCG 0.5 MCG: 0.25 CAPSULE ORAL at 09:06

## 2021-03-30 RX ADMIN — Medication 10 ML: at 21:38

## 2021-03-30 RX ADMIN — CALCIUM ACETATE 1334 MG: 667 CAPSULE ORAL at 17:54

## 2021-03-30 RX ADMIN — BENZOCAINE, BUTAMBEN, AND TETRACAINE HYDROCHLORIDE 1 SPRAY: .028; .004; .004 AEROSOL, SPRAY TOPICAL at 12:56

## 2021-03-30 RX ADMIN — OXYCODONE HYDROCHLORIDE AND ACETAMINOPHEN 1 TABLET: 5; 325 TABLET ORAL at 05:08

## 2021-03-30 RX ADMIN — PANTOPRAZOLE SODIUM 40 MG: 40 TABLET, DELAYED RELEASE ORAL at 09:06

## 2021-03-30 RX ADMIN — WARFARIN SODIUM 5 MG: 5 TABLET ORAL at 17:54

## 2021-03-30 NOTE — PROGRESS NOTES
Vascular    AVG does not appear infected  Will get duplex of graft to make sure there is no perigraft fluid

## 2021-03-30 NOTE — PROGRESS NOTES
Nephrology Progress Note  Blaise Leblanc     [facility]. [email or web address]  Phone - [phone]   Patient: Glenys Sidhu    YOB: 1975        Date- 3/30/2021   Admit Date: 3/29/2021  CC: Follow up for  ESRD        IMPRESSION & PLAN:   · esrd- home HD 5 days per week- Follows up with Dr Hyun [facility] at [facility]  · Failed RTX times 2  · Hyperkalemia  · Gram positive sepsis  · Anemia of ckd  · Hx of renal tx in past times 2.  · Hypertension  · CAD  · H/o DVT, s/p ivc filter/ h/o HIT     Plan:  -Plan for HD/UF tomorrow  -Vancomycin per ID.  -Ongoing work up for source of infection. EVER tomorrow. -CT spine negative for OM. -EPO for Anemia     Subjective: Interval History:   -  Feels better  -EVER today    Objective:   Vitals:    03/30/21 0344 03/30/21 0549 03/30/21 0800 03/30/21 0817   BP: 130/87   124/81   Pulse: 83  86 86   Resp: 16   16   Temp: 98.2 °F (36.8 °C)   98.5 °F (36.9 °C)   TempSrc:       SpO2: 100%   100%   Weight:  66.5 kg (146 lb 9.7 oz)     Height:          03/29 0701 - 03/30 0700  In: 440 [P.O.:440]  Out: 2500   Last 3 Recorded Weights in this Encounter    03/29/21 0357 03/30/21 0549   Weight: 66.3 kg (146 lb 2.6 oz) 66.5 kg (146 lb 9.7 oz)      Physical exam:   GEN: NAD  NECK- Supple, no mass  RESP: No wheezing, Clear b/l  CVS: S1,S2  RRR  NEURO: Normal speech, Non focal  EXT: No Edema   PSYCH: Normal Mood    Chart reviewed. Pertinent Notes reviewed. Data Review :  Recent Labs     03/30/21  0454 03/29/21  0506   * 128*   K 4.2 5.2*   CL 96* 89*   CO2 28 20*   BUN 33* 65*   CREA 6.49* 10.10*   GLU 87 78   CA 8.5 9.4   MG 2.2  --      Recent Labs     03/30/21  0454 03/29/21  0506   WBC 12.9* 17.8*   HGB 6.8* 7.7*   HCT 20.1* 23.0*    237     No results for input(s): FE, TIBC, PSAT, FERR in the last 72 hours.    Medication list  reviewed  Current Facility-Administered Medications   Medication Dose Route Frequency    Vancomycin - Pharmacy to dose Other Rx Dosing/Monitoring    atorvastatin (LIPITOR) tablet 20 mg  20 mg Oral DAILY    aspirin chewable tablet 81 mg  81 mg Oral DAILY    metoprolol succinate (TOPROL-XL) XL tablet 12.5 mg  12.5 mg Oral DAILY    pantoprazole (PROTONIX) tablet 40 mg  40 mg Oral ACB    calcitRIOL (ROCALTROL) capsule 0.5 mcg  0.5 mcg Oral DAILY    sodium chloride (NS) flush 5-40 mL  5-40 mL IntraVENous Q8H    sodium chloride (NS) flush 5-40 mL  5-40 mL IntraVENous PRN    acetaminophen (TYLENOL) tablet 650 mg  650 mg Oral Q6H PRN    Or    acetaminophen (TYLENOL) suppository 650 mg  650 mg Rectal Q6H PRN    polyethylene glycol (MIRALAX) packet 17 g  17 g Oral DAILY PRN    ondansetron (ZOFRAN ODT) tablet 4 mg  4 mg Oral Q8H PRN    Or    ondansetron (ZOFRAN) injection 4 mg  4 mg IntraVENous Q6H PRN    calcium acetate(phosphat bind) (PHOSLO) capsule 1,334 mg  2 Cap Oral TID WITH MEALS    epoetin emilie-epbx (RETACRIT) 12,000 Units combo injection  12,000 Units SubCUTAneous Q MON, WED & FRI    oxyCODONE-acetaminophen (PERCOCET) 5-325 mg per tablet 1 Tab  1 Tab Oral Q6H PRN          Rena Abebe MD              [facility]  [facility] / [facility], [address]  [place], [address]  Phone - [phone]               Fax - [phone]

## 2021-03-30 NOTE — PROGRESS NOTES
Received report from [facility]. Pt arrived to [facility]. Oriented to surroundings.

## 2021-03-30 NOTE — PROGRESS NOTES
MRI NOTE    After reviewing pts chart    A prior note was in the pts chart    11/20/2020    His pacemaker in non conditional and he cannot have a MRI scan    Call [phone] if you have any questions    The MRI will be canceled

## 2021-03-30 NOTE — PROGRESS NOTES
.      Hospitalist Progress Note    NAME: Nigel Burnette   :  1975   MRN:  [id number]       Assessment / Plan:  Gram-positive bacteremia (methicillin-resistant staph epidermidis) in 4 bottles 3/27  Had bacteremia in 2020, had femoral dialysis catheter for 2 months at the time (was tunneled catheter)  History of endocarditis  Transthoracic echocardiography with mobile structure in the left ventricle, ejection fraction is 30%mitral regurg  Coronary artery disease   ischemic cardiomyopathy with AICD  CT scan of the lumbar spine is not showing signs of osteomyelitis  Vascular surgery consulted no concerns about AV graft  Cardiology were consulted and they are looking into the a.m. 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 Code status: Full  Prophylaxis: Coumadin  Recommended Disposition: Home w/Family     Subjective:     Chief Complaint / Reason for Physician Visit  \"feeling better\". To follow-up with Mr. Isabela Eisenberg with gram-positive bacteremia discussed with RN events overnight. Review of Systems:  Symptom Y/N Comments  Symptom Y/N Comments   Fever/Chills n   Chest Pain n    Poor Appetite n   Edema     Cough n   Abdominal Pain n    Sputum n   Joint Pain     SOB/ZAMUIDO n   Pruritis/Rash     Nausea/vomit n   Tolerating PT/OT     Diarrhea    Tolerating Diet     Constipation    Other       Could NOT obtain due to:      Objective:     VITALS:   Last 24hrs VS reviewed since prior progress note.  Most recent are:  Patient Vitals for the past 24 hrs:   Temp Pulse Resp BP SpO2   21 1648 98.5 °F (36.9 °C) 93 16 138/84 100 %   21 1425  86 16 110/65 100 %   21 1417  84 14 101/68 100 %   03/30/21 1414  86 16 110/61 100 %   03/30/21 1409  82 16 112/67 100 %   03/30/21 1404  85 14 108/64 100 %   03/30/21 1400  84 16 (!) 108/53 100 %   03/30/21 1355  82 15 101/66 100 %   03/30/21 1354    98/60    03/30/21 1350  83 17 103/64 100 %   03/30/21 1345  84 14 98/60 100 %   03/30/21 1340  84 17 (!) 98/59 99 %   03/30/21 1335  84 19 (!) 93/59 99 %   03/30/21 1330  87 15 96/64 98 %   03/30/21 1325  89 16 106/64 98 %   03/30/21 1320  84 17 103/70 99 %   03/30/21 1315  84 20 108/69 100 %   03/30/21 1310  80 13 (!) 101/57 100 %   03/30/21 1305  87 24 111/63 100 %   03/30/21 1300  86 13 130/67 100 %   03/30/21 1255  83 11 111/78 100 %   03/30/21 1250  82 14 118/79 100 %   03/30/21 1245  85 14 123/76 100 %   03/30/21 1240  85 14 123/83 100 %   03/30/21 1239  86 16 123/83 100 %   03/30/21 1236  85 12 117/75    03/30/21 1200  84      03/30/21 1140 97.4 °F (36.3 °C) 87 16 110/70 100 %   03/30/21 0817 98.5 °F (36.9 °C) 86 16 124/81 100 %   03/30/21 0800  86      03/30/21 0344 98.2 °F (36.8 °C) 83 16 130/87 100 %   03/30/21 0034 97.5 °F (36.4 °C) 85 16 132/67 100 %   03/29/21 2000 98.2 °F (36.8 °C) 100 16 135/77 100 %       Intake/Output Summary (Last 24 hours) at 3/30/2021 1703  Last data filed at 3/30/2021 0549  Gross per 24 hour   Intake 440 ml   Output    Net 440 ml        I had a face to face encounter and independently examined this patient on 3/30/2021, as outlined below:  PHYSICAL EXAM:  General: WD, WN. Alert, cooperative, no acute distress    EENT:  EOMI. Anicteric sclerae. MMM  Resp:  CTA bilaterally, no wheezing or rales. No accessory muscle use  CV:  Regular  rhythm,  No edema  GI:  Soft, Non distended, Non tender. +Bowel sounds  Neurologic:  Alert and oriented X 3, normal speech,   Psych:   Good insight. Not anxious nor agitated  Skin:  No rashes.   No jaundice    Reviewed most current lab test results and cultures  YES  Reviewed most current radiology test results   YES  Review and summation of old records today    NO  Reviewed patient's current orders and MAR    YES  PMH/SH reviewed - no change compared to H&P  ________________________________________________________________________  Care Plan discussed with:    Comments   Patient x    Family      RN x    Care Manager x    Consultant                       x Multidiciplinary team rounds were held today with , nursing, pharmacist and clinical coordinator. Patient's plan of care was discussed; medications were reviewed and discharge planning was addressed. ________________________________________________________________________  Total NON critical care TIME:  32   Minutes    Total CRITICAL CARE TIME Spent:   Minutes non procedure based      Comments   >50% of visit spent in counseling and coordination of care x    ________________________________________________________________________  Dallin Waggoner MD     Procedures: see electronic medical records for all procedures/Xrays and details which were not copied into this note but were reviewed prior to creation of Plan. LABS:  I reviewed today's most current labs and imaging studies.   Pertinent labs include:  Recent Labs     03/30/21  0454 03/29/21  0506   WBC 12.9* 17.8*   HGB 6.8* 7.7*   HCT 20.1* 23.0*    237     Recent Labs     03/30/21  0454 03/30/21  0345 03/29/21  0506   *  --  128*   K 4.2  --  5.2*   CL 96*  --  89*   CO2 28  --  20*   GLU 87  --  78   BUN 33*  --  65*   CREA 6.49*  --  10.10*   CA 8.5  --  9.4   MG 2.2  --   --    ALB  --   --  3.4*   TBILI  --   --  0.6   ALT  --   --  14   INR  --  1.4* 1.7*       Signed: Dallin Waggoner MD

## 2021-03-30 NOTE — PROGRESS NOTES
Lab at [facility] called in critical result from paired blood cultures that were drawn and sent yesterday. Gram positive cocci in clusters were found in one of four bottles drawn from the Right AC.

## 2021-03-30 NOTE — PROGRESS NOTES
End of Shift Note    Bedside shift change report given to Hebert Fink RN (oncoming nurse) by Desmond Chanel (offgoing nurse). Report included the following information SBAR, Kardex, Intake/Output, MAR and Recent Results    Shift worked:  7am-7pm     Shift summary and any significant changes:     Pt rested. Received scheduled meds. Had a couple of scans and procedures today. No other significant changes. Concerns for physician to address:  None. Zone phone for oncoming shift:   0506       Activity:  Activity Level: Up ad fouzia  Number times ambulated in hallways past shift: 0  Number of times OOB to chair past shift: 3    Cardiac:   Cardiac Monitoring: Yes      Cardiac Rhythm: Normal sinus rhythm    Access:   Current line(s): PIV and HD access     Genitourinary:   Urinary status: anuric    Respiratory:   O2 Device: Room air  Chronic home O2 use?: NO  Incentive spirometer at bedside: N/A     GI:     Current diet:  DIET RENAL Regular  Passing flatus: YES  Tolerating current diet: YES       Pain Management:   Patient states pain is manageable on current regimen: YES    Skin:  Yong Score: 21  Interventions: increase time out of bed    Patient Safety:  Fall Score:  Total Score: 1  Interventions: gripper socks       Length of Stay:  Expected LOS: 4d 19h  Actual LOS: [address] [facility]

## 2021-03-30 NOTE — PROGRESS NOTES
TRANSFER - OUT REPORT:    [facility] on Glenys Sidhu being transferred to 2109(unit) for routine progression of care       Report consisted of patient's Situation, Background, Assessment and   Recommendations(SBAR). Information from the following report(s) SBAR, Procedure Summary, Intake/Output, MAR, Recent Results, Cardiac Rhythm NSR and Procedure Verification was reviewed with the receiving nurse. Opportunity for questions and clarification was provided.       Patient transported with:   [employer]

## 2021-03-30 NOTE — PROGRESS NOTES
Pharmacy Daily Dosing of Warfarin    Indication: AFib    Goal INR: 2-3    PTA Warfarin Dose: warfarin 2.5 mg daily (confirmed with patient)    Concurrent anticoagulants: NA    Concurrent antiplatelet: aspirin    Major Interacting Medications   Drugs that may increase INR: NA  Drugs that may decrease INR: NA    Conditions that may increase/decrease INR (CHF, C. diff, cirrhosis, thyroid disorder, hypoalbuminemia): NA    Labs:  Recent Labs     03/30/21  0454 03/30/21  0345 03/29/21  0506   INR  --  1.4* 1.7*   HGB 6.8*  --  7.7*     --  237   TBILI  --   --  0.6   ALB  --   --  3.4*     Impression/Plan:   INR subtherapeutic, now starting warfarin per consult. Will order warfarin 5 mg PO x 1 dose. Daily INR  CBC w/o diff QOD     Pharmacy will follow daily and adjust the dose as appropriate. Thanks  Ana Lilia Munzo PHARMD      [email or web address]. pdf

## 2021-03-30 NOTE — PROGRESS NOTES
1357 - Pt sedated with 2 mg Versed and 25 mcg Fentanyl for EVER (monitored sedation from 1314 To 1443)    Report to Farnaz Cerrato RN - pt to recovery.

## 2021-03-30 NOTE — PROGRESS NOTES
MRI PENDING    MRI screening sheet needs to be completed and signed    Call [phone] when this is done      Fax  [phone]

## 2021-03-30 NOTE — CONSULTS
Vascular Surgery Consult Note  3/30/2021    Subjective:     Sarah Cardenas is a 39 y.o.  with a hx of ASHD, AICD, HTN, HLD, ESRF, Recurrent DVT s/p IVC, GERD, and SBO. He is admitted to the hospital with sepsis secondary to bacteremia. He has a hx of endocarditis and was diagnosed with MRSE bacteremia 11/2020 due to a femoral dialysis catheter. We have been asked to evaluate for RUE AVG infection. He is s/p creation of a LUE [place] AVG 9/2020. Past Medical History   PAD  -left above-knee popliteal to anterior tibial artery bypass with reverse saphenous vein graft. 02/2012  ESRF  -s/p renal cell transplant x2 (1992 & 2001)  -home HD 5 days per week [facility]  Anemia of chronic renal disease   MRSE bacteremia 11/2020   HTN  HLD  ASHD  -hx of stenting 2007  Cardiomyopathy  -EF 25-30% 11/2020    AICD  -St. Aki 6/2009  Endocarditis   Peritonitis   Recurrent DVT  -s/p IVC  -warfarin   H. I.T  Seizure   SBO  -s/p resection 2011  Abdominal hematoma   GERD    Past Surgical History in addition to above   RUE Gortex AVG 9/2020   Insertion of arteriovenous graft, right arm and ligation of arteriovenous fistula, right arm 11/15/2017. Insertion of arteriovenous graft, right upper arm (bovine), ligation of arteriovenous fistula, right arm 11/14/2017. Right femoral HD access   Appendectomy   Cholecystectomy   Ex-lap 2011    Family History   Problem Relation Age of Onset    COPD Mother     Lung Disease Mother     Cancer Father         stomach    Cancer Maternal Grandmother       Social History     Tobacco Use    Smoking status: Never Smoker    Smokeless tobacco: Never Used   Substance Use Topics    Alcohol use: No       Prior to Admission medications    Medication Sig Start Date End Date Taking?  Authorizing Provider   ondansetron (Zofran ODT) 4 mg disintegrating tablet 1 Tab by SubLINGual route every eight (8) hours as needed for Nausea or Vomiting. 3/27/21   Lucinda Fatima MD oxyCODONE-acetaminophen (Percocet) 5-325 mg per tablet Take 1 Tab by mouth every eight (8) hours as needed for Pain for up to 3 days. Max Daily Amount: 3 Tabs. Indications: pain 3/27/21 3/30/21  Flores Del Cid MD   metoprolol succinate (TOPROL-XL) 25 mg XL tablet Take 0.5 Tabs by mouth daily. 11/10/20   Brit Weaver MD   calcium acetate,phosphat bind, (PHOSLO) 667 mg cap Take 2 Caps by mouth three (3) times daily (with meals). Indications: low amount of calcium in the blood, renal osteodystrophy with hyperphosphatemia 11/10/20   Brit Weaver MD   atorvastatin (Lipitor) 20 mg tablet Take 20 mg by mouth daily. Provider, Historical   warfarin (COUMADIN) 2.5 mg tablet Take 2.5 mg by mouth daily. Provider, Historical   acetaminophen (TYLENOL) 500 mg tablet Take 1 Tab by mouth every four (4) hours as needed for Pain. Over the counter 1/20/19   Latoya Avila MD   omeprazole (PRILOSEC) 20 mg capsule Take 20 mg by mouth daily. Provider, Historical   calcitRIOL (ROCALTROL) 0.5 mcg capsule Take 0.5 mcg by mouth daily. Provider, Historical   aspirin 81 mg chewable tablet Take 81 mg by mouth daily. Other, MD Morro     Allergies   Allergen Reactions    Shellfish Containing Products Swelling     Break out in rash, trouble breathing    Contrast Agent [Iodine] Shortness of Breath    Heparin Analogues Other (comments)     Positive history of HIT      Review of Systems   Constitutional: Positive for activity change, chills, fatigue and fever. HENT: Negative for congestion. Eyes: Negative for visual disturbance. Respiratory: Negative for cough and shortness of breath. Cardiovascular: Negative for chest pain and leg swelling. Gastrointestinal: Negative for nausea and vomiting. Endocrine: Negative for polydipsia and polyuria. Genitourinary: Negative. Musculoskeletal: Positive for myalgias. Skin: Negative for color change and wound. Neurological: Negative for weakness.    Hematological: Negative. Psychiatric/Behavioral: Negative. Objective:       Patient Vitals for the past 24 hrs:   BP Temp Temp src Pulse Resp SpO2 Weight   03/30/21 1140 110/70 97.4 °F (36.3 °C)  87 16 100 %    03/30/21 0817 124/81 98.5 °F (36.9 °C)  86 16 100 %    03/30/21 0800    86      03/30/21 0549       66.5 kg (146 lb 9.7 oz)   03/30/21 0344 130/87 98.2 °F (36.8 °C)  83 16 100 %    03/30/21 0034 132/67 97.5 °F (36.4 °C)  85 16 100 %    03/29/21 2000 135/77 98.2 °F (36.8 °C)  100 16 100 %    03/29/21 1600 133/76 98.2 °F (36.8 °C) Oral 88 16     03/29/21 1548    84      03/29/21 1545 127/77   81 16     03/29/21 1530 126/75   80 16     03/29/21 1515 129/75   80 16     03/29/21 1500 125/74   77 16     03/29/21 1445 122/78   79 16     03/29/21 1430 114/71   78 16     03/29/21 1415 120/77   79 16     03/29/21 1400 99/79   83 16     03/29/21 1345 106/63   79 16     03/29/21 1330 107/64   83 16     03/29/21 1315 122/77   86 16     03/29/21 1300 129/76   87 16     03/29/21 1245 116/80   87 16     03/29/21 1230 127/84 98.5 °F (36.9 °C) Oral 88 16     03/29/21 1206 124/75 98.5 °F (36.9 °C)  92 16 100 %    03/29/21 1156    96          Physical Exam  HENT:      Head: Normocephalic. Nose: Nose normal.      Mouth/Throat:      Mouth: Mucous membranes are moist.   Eyes:      Pupils: Pupils are equal, round, and reactive to light. Neck:      Musculoskeletal: Normal range of motion. Cardiovascular:      Rate and Rhythm: Normal rate and regular rhythm. Comments: RUE AVG with palpable thrill and no erythema or edema. Pulmonary:      Effort: Pulmonary effort is normal. No respiratory distress. Abdominal:      General: Abdomen is flat. There is no distension. Musculoskeletal: Normal range of motion. Skin:     General: Skin is warm. Neurological:      General: No focal deficit present. Mental Status: He is alert. Mental status is at baseline.    Psychiatric:         Mood and Affect: Mood normal.         Behavior: Behavior normal.       Pertinent Test Results:   Recent Results (from the past 24 hour(s))   ECHO ADULT COMPLETE    Collection Time: 03/29/21  6:46 PM   Result Value Ref Range    IVSd 1.18 (A) 0.60 - 1.00 cm    IVSs 1.27 cm    LVIDd 5.91 (A) 4.20 - 5.90 cm    LVIDs 4.46 cm    LVOT d 2.10 cm    LVPWd 1.45 (A) 0.60 - 1.00 cm    LVPWs 1.57 cm    LVOT Peak Gradient 3.87 mmHg    LVOT Peak Velocity 96.89 cm/s    RVIDd 4.94 cm    RVSP 41.23 mmHg    Left Atrium Major Axis 4.41 cm    LA Volume 134.53 18.0 - 58.0 mL    LA Area 4C 31.76 cm2    LA Vol 2C 120.10 (A) 18.00 - 58.00 mL    LA Vol 4C 112.81 (A) 18.00 - 58.00 mL    Est. RA Pressure 10.00 mmHg    Aortic Valve Area by Continuity of Peak Velocity 2.13 cm2    AoV PG 9.95 mmHg    Aortic Valve Systolic Peak Velocity 566.71 cm/s    MV Peak Gradient 5.17 mmHg    MV Mean Gradient 2.12 mmHg    Mitral Valve Max Velocity 113.70 cm/s    Mitral Valve Annulus Velocity Time Integral 22.52 cm    Pulmonic Valve Systolic Peak Instantaneous Gradient 5.01 mmHg    Pulmonic Valve Max Velocity 111.89 cm/s    Triscuspid Valve Regurgitation Peak Gradient 31.23 mmHg    TR Max Velocity 279.41 cm/s    Ao Root D 3.31 cm    LV Mass .1 88.0 - 224.0 g    LV Mass AL Index 196.1 49.0 - 115.0 g/m2    Left Atrium Minor Axis 2.49 cm    LA Vol Index 76.02 16.00 - 28.00 ml/m2    LA Vol Index 67.86 16.00 - 28.00 ml/m2    LA Vol Index 63.74 16.00 - 28.00 ml/m2    MICHAEL/BSA Pk Scott 1.2 cm2/m2   PROTHROMBIN TIME + INR    Collection Time: 03/30/21  3:45 AM   Result Value Ref Range    INR 1.4 (H) 0.9 - 1.1      Prothrombin time 14.5 (H) 9.0 - 11.1 sec   CBC WITH AUTOMATED DIFF    Collection Time: 03/30/21  4:54 AM   Result Value Ref Range    WBC 12.9 (H) 4.1 - 11.1 K/uL    RBC 2.14 (L) 4.10 - 5.70 M/uL    HGB 6.8 (L) 12.1 - 17.0 g/dL    HCT 20.1 (L) 36.6 - 50.3 %    MCV 93.9 80.0 - 99.0 FL    MCH 31.8 26.0 - 34.0 PG    MCHC 33.8 30.0 - 36.5 g/dL    RDW 16.8 (H) 11.5 - 14.5 %    PLATELET 148 645 - 640 K/uL    MPV 10.6 8.9 - 12.9 FL    NRBC 0.0 0  WBC    ABSOLUTE NRBC 0.00 0.00 - 0.01 K/uL    NEUTROPHILS 77 (H) 32 - 75 %    LYMPHOCYTES 9 (L) 12 - 49 %    MONOCYTES 10 5 - 13 %    EOSINOPHILS 3 0 - 7 %    BASOPHILS 1 0 - 1 %    IMMATURE GRANULOCYTES 1 (H) 0.0 - 0.5 %    ABS. NEUTROPHILS 10.0 (H) 1.8 - 8.0 K/UL    ABS. LYMPHOCYTES 1.2 0.8 - 3.5 K/UL    ABS. MONOCYTES 1.3 (H) 0.0 - 1.0 K/UL    ABS. EOSINOPHILS 0.4 0.0 - 0.4 K/UL    ABS. BASOPHILS 0.1 0.0 - 0.1 K/UL    ABS. IMM. GRANS. 0.1 (H) 0.00 - 0.04 K/UL    DF AUTOMATED     METABOLIC PANEL, BASIC    Collection Time: 03/30/21  4:54 AM   Result Value Ref Range    Sodium 133 (L) 136 - 145 mmol/L    Potassium 4.2 3.5 - 5.1 mmol/L    Chloride 96 (L) 97 - 108 mmol/L    CO2 28 21 - 32 mmol/L    Anion gap 9 5 - 15 mmol/L    Glucose 87 65 - 100 mg/dL    BUN 33 (H) 6 - 20 MG/DL    Creatinine 6.49 (H) 0.70 - 1.30 MG/DL    BUN/Creatinine ratio 5 (L) 12 - 20      GFR est AA 11 (L) >60 ml/min/1.73m2    GFR est non-AA 9 (L) >60 ml/min/1.73m2    Calcium 8.5 8.5 - 10.1 MG/DL   MAGNESIUM    Collection Time: 03/30/21  4:54 AM   Result Value Ref Range    Magnesium 2.2 1.6 - 2.4 mg/dL   DUPLEX HEMODIALYSIS ACCESS LEFT    Collection Time: 03/30/21 10:08 AM   Result Value Ref Range    Left AVF Prox Outflow .6 cm/s    Left AVF Prox Outflow .4 cm/s    Left AVF Mid Outflow .8 cm/s    Left AVF Mid Outflow .2 cm/s    Left AVF Dist Outflow .1 cm/s    Left AVF Dist Outflow .3 cm/s       Assessmen/Plan:     Sepsis  -leukocytosis improving   MSSA Bacteremia  Endocarditis (mobile structure in the left ventricle)   -Hx of MRSE bacteremia 11/2020 (right femoral catheter removed)  -Hx of endocarditis  -Hx of AICD  -Hx of LUE [place]-Ryan AVG  -Hx of RUE bovine AVG  -Hx of multiple RUE stents  · No obvious s/s of LUE AVG infection.   Will order US of the LUE to confirm. · Antibx per infectious disease. PAD  -left femoral to popliteal vein bypass  -ASA and statin    · Feet are perfused   · Routine outpatient f/u     ESRF  -s/p renal cell transplant x2 (1992 & 2001)  -home HD 5 days per week [facility]  Hypoantremia   Hyperkalemia   Anemia of chronic renal disease   -Retacrit    HTN  HLD  ASHD  -ASA, BBlocker, & statin   Cardiomyopathy  -EF 30-35%    AICD  -[facility]. [facility] 6/2009    Seizure disorder    GERD  -PPI    VTE Prophylaxis:  Recurrent DVT  -s/p IVC  -warfarin   Hx of H. I.T  Warfarin subtherapeutic   SCDs contraindicated in PAD  Encourage OOB      Disposition:  Likely home       Signed By: Clay Emanuel NP     March 30, 2021

## 2021-03-30 NOTE — PROGRESS NOTES
Infectious Disease Progress Note         Interval:  Remains afebrile and HDS. TTE showed a mobile structure in the left ventricle of unclear etiology. Subjective:   Feels OK. Still having a lot of back pain. Denied f/c/night sweats. Objective:    Vitals:   Patient Vitals for the past 24 hrs:   Temp Pulse Resp BP SpO2   03/30/21 0817 98.5 °F (36.9 °C) 86 16 124/81 100 %   03/30/21 0344 98.2 °F (36.8 °C) 83 16 130/87 100 %   03/30/21 0034 97.5 °F (36.4 °C) 85 16 132/67 100 %   03/29/21 2000 98.2 °F (36.8 °C) 100 16 135/77 100 %   03/29/21 1600 98.2 °F (36.8 °C) 88 16 133/76    03/29/21 1548  84      03/29/21 1545  81 16 127/77    03/29/21 1530  80 16 126/75    03/29/21 1515  80 16 129/75    03/29/21 1500  77 16 125/74    03/29/21 1445  79 16 122/78    03/29/21 1430  78 16 114/71    03/29/21 1415  79 16 120/77    03/29/21 1400  83 16 99/79    03/29/21 1345  79 16 106/63    03/29/21 1330  83 16 107/64    03/29/21 1315  86 16 122/77    03/29/21 1300  87 16 129/76    03/29/21 1245  87 16 116/80    03/29/21 1230 98.5 °F (36.9 °C) 88 16 127/84    03/29/21 1206 98.5 °F (36.9 °C) 92 16 124/75 100 %   03/29/21 1156  96      03/29/21 0854 98.7 °F (37.1 °C) 90 16 123/72 100 %       Physical Exam:  ? GEN: NAD  ? HEENT: Normocephalic, atraumatic, PERRL, no scleral icterus  ? CV: S1, S2 heard regularly, no murmurs, + thrill at the AV graft left arm, pacemaker sites appear benign - both the left lateral chest and the anterior chest region. ? Lungs: Clear to auscultation bilaterally  ? Abdomen: soft, non distended, non tende  ? Genitourinary: no oneal  ? Extremities: no edema  ? Neuro: Alert, oriented to time, place and situation, moves all extremities to commands, verbal   ? Skin: no rash  ? Psych: good affect, good eye contact, non tearful   ?  Lines: left arm AV graft        Labs:  Recent Results (from the past 24 hour(s))   ECHO ADULT COMPLETE    Collection Time: 03/29/21 6:46 PM   Result Value Ref Range    IVSd 1.18 (A) 0.60 - 1.00 cm    IVSs 1.27 cm    LVIDd 5.91 (A) 4.20 - 5.90 cm    LVIDs 4.46 cm    LVOT d 2.10 cm    LVPWd 1.45 (A) 0.60 - 1.00 cm    LVPWs 1.57 cm    LVOT Peak Gradient 3.87 mmHg    LVOT Peak Velocity 96.89 cm/s    RVIDd 4.94 cm    RVSP 41.23 mmHg    Left Atrium Major Axis 4.41 cm    LA Volume 134.53 18.0 - 58.0 mL    LA Area 4C 31.76 cm2    LA Vol 2C 120.10 (A) 18.00 - 58.00 mL    LA Vol 4C 112.81 (A) 18.00 - 58.00 mL    Est. RA Pressure 10.00 mmHg    Aortic Valve Area by Continuity of Peak Velocity 2.13 cm2    AoV PG 9.95 mmHg    Aortic Valve Systolic Peak Velocity 037.09 cm/s    MV Peak Gradient 5.17 mmHg    MV Mean Gradient 2.12 mmHg    Mitral Valve Max Velocity 113.70 cm/s    Mitral Valve Annulus Velocity Time Integral 22.52 cm    Pulmonic Valve Systolic Peak Instantaneous Gradient 5.01 mmHg    Pulmonic Valve Max Velocity 111.89 cm/s    Triscuspid Valve Regurgitation Peak Gradient 31.23 mmHg    TR Max Velocity 279.41 cm/s    Ao Root D 3.31 cm    LV Mass .1 88.0 - 224.0 g    LV Mass AL Index 196.1 49.0 - 115.0 g/m2    Left Atrium Minor Axis 2.49 cm    LA Vol Index 76.02 16.00 - 28.00 ml/m2    LA Vol Index 67.86 16.00 - 28.00 ml/m2    LA Vol Index 63.74 16.00 - 28.00 ml/m2    MICHAEL/BSA Pk Scott 1.2 cm2/m2   PROTHROMBIN TIME + INR    Collection Time: 03/30/21  3:45 AM   Result Value Ref Range    INR 1.4 (H) 0.9 - 1.1      Prothrombin time 14.5 (H) 9.0 - 11.1 sec   CBC WITH AUTOMATED DIFF    Collection Time: 03/30/21  4:54 AM   Result Value Ref Range    WBC 12.9 (H) 4.1 - 11.1 K/uL    RBC 2.14 (L) 4.10 - 5.70 M/uL    HGB 6.8 (L) 12.1 - 17.0 g/dL    HCT 20.1 (L) 36.6 - 50.3 %    MCV 93.9 80.0 - 99.0 FL    MCH 31.8 26.0 - 34.0 PG    MCHC 33.8 30.0 - 36.5 g/dL    RDW 16.8 (H) 11.5 - 14.5 %    PLATELET 759 018 - 025 K/uL    MPV 10.6 8.9 - 12.9 FL    NRBC 0.0 0  WBC    ABSOLUTE NRBC 0.00 0.00 - 0.01 K/uL    NEUTROPHILS 77 (H) 32 - 75 %    LYMPHOCYTES 9 (L) 12 - 49 %    MONOCYTES 10 5 - 13 %    EOSINOPHILS 3 0 - 7 %    BASOPHILS 1 0 - 1 %    IMMATURE GRANULOCYTES 1 (H) 0.0 - 0.5 %    ABS. NEUTROPHILS 10.0 (H) 1.8 - 8.0 K/UL    ABS. LYMPHOCYTES 1.2 0.8 - 3.5 K/UL    ABS. MONOCYTES 1.3 (H) 0.0 - 1.0 K/UL    ABS. EOSINOPHILS 0.4 0.0 - 0.4 K/UL    ABS. BASOPHILS 0.1 0.0 - 0.1 K/UL    ABS. IMM. GRANS. 0.1 (H) 0.00 - 0.04 K/UL    DF AUTOMATED     METABOLIC PANEL, BASIC    Collection Time: 03/30/21  4:54 AM   Result Value Ref Range    Sodium 133 (L) 136 - 145 mmol/L    Potassium 4.2 3.5 - 5.1 mmol/L    Chloride 96 (L) 97 - 108 mmol/L    CO2 28 21 - 32 mmol/L    Anion gap 9 5 - 15 mmol/L    Glucose 87 65 - 100 mg/dL    BUN 33 (H) 6 - 20 MG/DL    Creatinine 6.49 (H) 0.70 - 1.30 MG/DL    BUN/Creatinine ratio 5 (L) 12 - 20      GFR est AA 11 (L) >60 ml/min/1.73m2    GFR est non-AA 9 (L) >60 ml/min/1.73m2    Calcium 8.5 8.5 - 10.1 MG/DL   MAGNESIUM    Collection Time: 03/30/21  4:54 AM   Result Value Ref Range    Magnesium 2.2 1.6 - 2.4 mg/dL           Micro:  3/27: Blood cultures 4/4 bottles with Staph epidermidis , Willian Cov 2 negative   3/30: Blood cultures 4/4 bottles positive. Assessment:  38 yo M with:        - Sepsis due to high-grade CoNS bacteremia this admission. Unknown source but possible related to he multiple declotting procedures done a few weeks ago. Potential deep infected sites include the AICD and the back. - Nephrotic syndome s/p 2 renal transplants. Both transplants failed due to graft-medication non-compliance (last transplant in 2001). - ESRD on iHD, 5 times a week from home since 2012. In 09/2020 had left AV graft placed since complicated by clotting issues. - CAD with MI with 2 AICD placements (2009 - non-functioning due to fractured lead, then in 2019): patient has both devices in him.    -  MSSA bacteremia in 2017  - MRSE bacteremia in 10/2020 likely femoral HD line-related and possible L1-L2 discitis/osteomyelitis treated with 6 weeks of vancomycin. Since 11/2020, the graft is being used for dialysis.       Recommendations:  - Spoke to micro lab, blood cultures from 3/27 are growing Staph epidermidis, including MRSE. BCx from 3/30 also positive.   - TTE 3/30 showed a mobile structure in the left ventricle of unclear etiology. I discussed case with cardiology Dr. Alejandrina Fletcher today. Patient has two ICD devices in him - one anterior left chest (this is the old one) and has components in vascular space (hence high risk for infection), and the newer one in the left lateral chest. The latter has all it's components (device and lead) in the subcutaneous tissue and hence will not be visualized by the EVER. The left lateral chest region is not appearing infectious at this time and hence suspicion for infection of this ICD is low at this time. - Will await EVER results. However given the high grade of the bacteremia, recurrence of the bacteremia with Staph epidermidis, and with no other source to implicate, might need to consider removal of the anterior chest ICD. Will need to review and discuss with Cardiology. - F/u speciation and susceptibilities for the Staph epidermidis   - Repeat BCx obtained today (ordered). - CT thoracic spine shows no evidence of OM/discitis (though this had to be done w/o contrast) and showed multilevel vertebral endplate Schmorl's nodes. - Continue empiric vancomycin goal trough 15-20. Will follow. Thank you for the opportunity to participate in the care of this patient. Please contact with questions or concerns.       Iman Weston MD  Infectious Diseases

## 2021-03-30 NOTE — PROGRESS NOTES
Problem: Falls - Risk of  Goal: *Absence of Falls  Description: Document Ocasio Mnan Fall Risk and appropriate interventions in the flowsheet.   Outcome: Progressing Towards Goal  Note: Fall Risk Interventions:            Medication Interventions: Patient to call before getting OOB                   Problem: Patient Education: Go to Patient Education Activity  Goal: Patient/Family Education  Outcome: Progressing Towards Goal

## 2021-03-30 NOTE — PROGRESS NOTES
Patient arrived to Non-Invasive Cardiology Lab for Out Patient EVER Procedure. Staff introduced to patient. Patient identifiers verified with Name and Date of Birth. Procedure verified with patient. Consent forms reviewed and signed by patient or authorized representative and verified. Allergies verified. Patient informed of procedure and plan of care. Questions answered with review. Patient on cardiac monitor, non-invasive blood pressure, SPO2 monitor. On 2L NC. Patient is A&Ox3. Patient reports no complaints. Patient on bed, in low position, with side rails up. Patient instructed to call for assistance as needed.

## 2021-03-31 ENCOUNTER — APPOINTMENT (OUTPATIENT)
Dept: VASCULAR SURGERY | Age: 46
DRG: 252 | End: 2021-03-31
Attending: SURGERY
Payer: MEDICARE

## 2021-03-31 LAB
ANION GAP SERPL CALC-SCNC: 13 MMOL/L (ref 5–15)
BUN SERPL-MCNC: 41 MG/DL (ref 6–20)
BUN/CREAT SERPL: 5 (ref 12–20)
CALCIUM SERPL-MCNC: 9.1 MG/DL (ref 8.5–10.1)
CHLORIDE SERPL-SCNC: 94 MMOL/L (ref 97–108)
CO2 SERPL-SCNC: 24 MMOL/L (ref 21–32)
CREAT SERPL-MCNC: 8.22 MG/DL (ref 0.7–1.3)
GLUCOSE SERPL-MCNC: 101 MG/DL (ref 65–100)
INR PPP: 1.4 (ref 0.9–1.1)
LEFT DIST OUTFLOW AVF EDV: 146.3 CM/S
LEFT DIST OUTFLOW AVF PSV: 213.1 CM/S
LEFT MID OUTFLOW AVF EDV: 304.2 CM/S
LEFT MID OUTFLOW AVF PSV: 471.8 CM/S
LEFT PROX OUTFLOW AVF EDV: 189.4 CM/S
LEFT PROX OUTFLOW AVF PSV: 290.6 CM/S
POTASSIUM SERPL-SCNC: 4.3 MMOL/L (ref 3.5–5.1)
PROTHROMBIN TIME: 14.1 SEC (ref 9–11.1)
SODIUM SERPL-SCNC: 131 MMOL/L (ref 136–145)

## 2021-03-31 PROCEDURE — 36415 COLL VENOUS BLD VENIPUNCTURE: CPT

## 2021-03-31 PROCEDURE — 74011250637 HC RX REV CODE- 250/637: Performed by: STUDENT IN AN ORGANIZED HEALTH CARE EDUCATION/TRAINING PROGRAM

## 2021-03-31 PROCEDURE — 74011000258 HC RX REV CODE- 258: Performed by: STUDENT IN AN ORGANIZED HEALTH CARE EDUCATION/TRAINING PROGRAM

## 2021-03-31 PROCEDURE — 65270000029 HC RM PRIVATE

## 2021-03-31 PROCEDURE — 74011250637 HC RX REV CODE- 250/637: Performed by: INTERNAL MEDICINE

## 2021-03-31 PROCEDURE — 80048 BASIC METABOLIC PNL TOTAL CA: CPT

## 2021-03-31 PROCEDURE — 85610 PROTHROMBIN TIME: CPT

## 2021-03-31 PROCEDURE — 93922 UPR/L XTREMITY ART 2 LEVELS: CPT

## 2021-03-31 PROCEDURE — 93926 LOWER EXTREMITY STUDY: CPT

## 2021-03-31 PROCEDURE — 90935 HEMODIALYSIS ONE EVALUATION: CPT

## 2021-03-31 PROCEDURE — 74011250636 HC RX REV CODE- 250/636: Performed by: INTERNAL MEDICINE

## 2021-03-31 PROCEDURE — 74011250636 HC RX REV CODE- 250/636: Performed by: STUDENT IN AN ORGANIZED HEALTH CARE EDUCATION/TRAINING PROGRAM

## 2021-03-31 PROCEDURE — 99233 SBSQ HOSP IP/OBS HIGH 50: CPT | Performed by: STUDENT IN AN ORGANIZED HEALTH CARE EDUCATION/TRAINING PROGRAM

## 2021-03-31 RX ORDER — WARFARIN 2 MG/1
4 TABLET ORAL
Status: COMPLETED | OUTPATIENT
Start: 2021-03-31 | End: 2021-03-31

## 2021-03-31 RX ADMIN — ATORVASTATIN CALCIUM 20 MG: 20 TABLET, FILM COATED ORAL at 12:26

## 2021-03-31 RX ADMIN — EPOETIN ALFA-EPBX 12000 UNITS: 10000 INJECTION, SOLUTION INTRAVENOUS; SUBCUTANEOUS at 21:28

## 2021-03-31 RX ADMIN — Medication 10 ML: at 14:29

## 2021-03-31 RX ADMIN — VANCOMYCIN HYDROCHLORIDE 500 MG: 500 INJECTION, POWDER, LYOPHILIZED, FOR SOLUTION INTRAVENOUS at 14:22

## 2021-03-31 RX ADMIN — ONDANSETRON 4 MG: 4 TABLET, ORALLY DISINTEGRATING ORAL at 00:47

## 2021-03-31 RX ADMIN — ASPIRIN 81 MG: 81 TABLET, CHEWABLE ORAL at 12:22

## 2021-03-31 RX ADMIN — OXYCODONE HYDROCHLORIDE AND ACETAMINOPHEN 1 TABLET: 5; 325 TABLET ORAL at 01:14

## 2021-03-31 RX ADMIN — Medication 10 ML: at 21:27

## 2021-03-31 RX ADMIN — CALCITRIOL CAPSULES 0.25 MCG 0.5 MCG: 0.25 CAPSULE ORAL at 12:21

## 2021-03-31 RX ADMIN — Medication 10 ML: at 06:32

## 2021-03-31 RX ADMIN — PANTOPRAZOLE SODIUM 40 MG: 40 TABLET, DELAYED RELEASE ORAL at 06:32

## 2021-03-31 RX ADMIN — CALCIUM ACETATE 1334 MG: 667 CAPSULE ORAL at 12:21

## 2021-03-31 RX ADMIN — CALCIUM ACETATE 1334 MG: 667 CAPSULE ORAL at 16:33

## 2021-03-31 RX ADMIN — WARFARIN SODIUM 4 MG: 2 TABLET ORAL at 17:15

## 2021-03-31 RX ADMIN — OXYCODONE HYDROCHLORIDE AND ACETAMINOPHEN 1 TABLET: 5; 325 TABLET ORAL at 12:26

## 2021-03-31 NOTE — PROGRESS NOTES
Infectious Disease Progress Note         Interval:  ARCADIO    Subjective:   Seen at dialysis this morning. Reports feeling well. Still has back pain. Denied f/c. He denied feeling malaise, weakness , sickness while being dialyzed using the left AVG. Objective:    Vitals:   Patient Vitals for the past 24 hrs:   Temp Pulse Resp BP SpO2   03/31/21 0747  (!) 101      03/31/21 0521 97.4 °F (36.3 °C) (!) 104 16 (!) 101/51 99 %   03/30/21 2352 97.5 °F (36.4 °C) 87 16 110/69 99 %   03/30/21 2003 97.8 °F (36.6 °C) 89 16 97/60 94 %   03/30/21 1648 98.5 °F (36.9 °C) 93 16 138/84 100 %   03/30/21 1425  86 16 110/65 100 %   03/30/21 1417  84 14 101/68 100 %   03/30/21 1414  86 16 110/61 100 %   03/30/21 1409  82 16 112/67 100 %   03/30/21 1404  85 14 108/64 100 %   03/30/21 1400  84 16 (!) 108/53 100 %   03/30/21 1355  82 15 101/66 100 %   03/30/21 1354    98/60    03/30/21 1350  83 17 103/64 100 %   03/30/21 1345  84 14 98/60 100 %   03/30/21 1340  84 17 (!) 98/59 99 %   03/30/21 1335  84 19 (!) 93/59 99 %   03/30/21 1330  87 15 96/64 98 %   03/30/21 1325  89 16 106/64 98 %   03/30/21 1320  84 17 103/70 99 %   03/30/21 1315  84 20 108/69 100 %   03/30/21 1310  80 13 (!) 101/57 100 %   03/30/21 1305  87 24 111/63 100 %   03/30/21 1300  86 13 130/67 100 %   03/30/21 1255  83 11 111/78 100 %   03/30/21 1250  82 14 118/79 100 %   03/30/21 1245  85 14 123/76 100 %   03/30/21 1240  85 14 123/83 100 %   03/30/21 1239  86 16 123/83 100 %   03/30/21 1236  85 12 117/75    03/30/21 1200  84      03/30/21 1140 97.4 °F (36.3 °C) 87 16 110/70 100 %       Physical Exam:  ? GEN: NAD  ? HEENT: Normocephalic, atraumatic, PERRL, no scleral icterus  ? CV: S1, S2 heard regularly, no murmurs, + thrill at the AV graft left arm, pacemaker sites appear benign - both the left lateral chest and the anterior chest region. ? Lungs: Clear to auscultation bilaterally  ?  Abdomen: soft, non distended, non tende  ? Genitourinary: no oneal  ? Extremities: no edema  ? Neuro: Alert, oriented to time, place and situation, moves all extremities to commands, verbal   ? Skin: no rash  ? Psych: good affect, good eye contact, non tearful   ?  Lines: left arm AV graft        Labs:  Recent Results (from the past 24 hour(s))   DUPLEX HEMODIALYSIS ACCESS LEFT    Collection Time: 03/30/21 10:08 AM   Result Value Ref Range    Left AVF Prox Outflow .6 cm/s    Left AVF Prox Outflow .4 cm/s    Left AVF Mid Outflow .8 cm/s    Left AVF Mid Outflow .2 cm/s    Left AVF Dist Outflow .1 cm/s    Left AVF Dist Outflow .3 cm/s   CULTURE, BLOOD    Collection Time: 03/30/21  3:57 PM    Specimen: Blood   Result Value Ref Range    Special Requests: NO SPECIAL REQUESTS      Culture result: NO GROWTH AFTER 15 HOURS     CULTURE, BLOOD    Collection Time: 03/30/21  3:57 PM    Specimen: Blood   Result Value Ref Range    Special Requests: NO SPECIAL REQUESTS      Culture result: NO GROWTH AFTER 15 HOURS     PROTHROMBIN TIME + INR    Collection Time: 03/31/21  1:39 AM   Result Value Ref Range    INR 1.4 (H) 0.9 - 1.1      Prothrombin time 14.1 (H) 9.0 - 20.2 sec   METABOLIC PANEL, BASIC    Collection Time: 03/31/21  1:39 AM   Result Value Ref Range    Sodium 131 (L) 136 - 145 mmol/L    Potassium 4.3 3.5 - 5.1 mmol/L    Chloride 94 (L) 97 - 108 mmol/L    CO2 24 21 - 32 mmol/L    Anion gap 13 5 - 15 mmol/L    Glucose 101 (H) 65 - 100 mg/dL    BUN 41 (H) 6 - 20 MG/DL    Creatinine 8.22 (H) 0.70 - 1.30 MG/DL    BUN/Creatinine ratio 5 (L) 12 - 20      GFR est AA 9 (L) >60 ml/min/1.73m2    GFR est non-AA 7 (L) >60 ml/min/1.73m2    Calcium 9.1 8.5 - 10.1 MG/DL           Micro:  3/27: Blood cultures 4/4 bottles with Staph epidermidis (Methicillin-R) , Willian Cov 2 negative   3/29: Blood cultures 4/4 bottles positive for CoNS  3/30: Blood cultures NGSF              Assessment:  40 yo M with:        - Sepsis due to high-grade CoNS bacteremia this admission. Unknown source but possible related to he multiple declotting procedures done a few weeks ago. - CAD with MI with 2 AICD placements (2009 - non-functioning due to fractured lead, then a subcutaneous ICD placement in 2019): patient has both devices in him. - High suspicion for endovascular ICD infection given high grade of the bacteremia, recurrence of the bacteremia with Staph epidermidis (MRSE bacteremia is 10/2020 as well). - Nephrotic syndome s/p 2 renal transplants. Both transplants failed due to graft-medication non-compliance (last transplant in 2001). - ESRD on iHD, 5 times a week from home since 2012. In 09/2020 had left AV graft placed since complicated by clotting issues. -  MSSA bacteremia in 2017  - MRSE bacteremia in 10/2020 likely femoral HD line-related and possible L1-L2 discitis/osteomyelitis treated with 6 weeks of vancomycin. Since 11/2020, the graft is being used for dialysis.       Recommendations:  - EVER on 3/30 did not show any valvular or lead involvement. Vascular surgery might explore the graft later in the week given finding of some perigraft fluid seen on imaging. This certainly can be a source of the high-grade bacteremia. I will review case with vascular surgery for their future steps. - Discussed case in detail with Dr. Rodo Rodríguez from cardiology today, and he will be reviewing case with other team members as to the feasibility and risk of the ICD removal. Along with vascular surgery and cardiology input, we will base further plans for graft and ICD management. - F/u BCx from 3/29 and 3/30. Obtain more if 3/30 BCx turn positive. - Continue vancomycin goal trough 15-20      Will follow. This is a high complexity patient. I personally spent 55 minutes or more of time in caring for this patient.   This is time spent at this patient's bedside actively involved in patient care as well as the review of pertinent literature, and coordination of care and discussions with the patient's family and other medical specialists. Thank you for the opportunity to participate in the care of this patient. Please contact with questions or concerns.       Oswald Padron MD  Infectious Diseases

## 2021-03-31 NOTE — PROGRESS NOTES
End of Shift Note    Bedside shift change report given to Theta Collet, RN (oncoming nurse) by Luann Parisi (offgoing nurse). Report included the following information SBAR, Kardex, Intake/Output, MAR and Recent Results    Shift worked:  7am-7pm     Shift summary and any significant changes:     Pt rested. Had dialysis for beginning half of shift. Pt received scheduled meds and pain meds. Old IV infiltrated and PICC team placed a new IV. Blood cultures from 3/30 resulted today, see previous note for details. No other significant changes. Concerns for physician to address:  None. Zone phone for oncoming shift:   [phone]       Activity:  Activity Level: Up ad fouzia  Number times ambulated in hallways past shift: 1  Number of times OOB to chair past shift: 3    Cardiac:   Cardiac Monitoring: Yes      Cardiac Rhythm: Normal sinus rhythm, Sinus tachycardia    Access:   Current line(s): PIV and HD access     Genitourinary:   Urinary status: anuric    Respiratory:   O2 Device: Room air  Chronic home O2 use?: NO  Incentive spirometer at bedside: N/A     GI:  Last Bowel Movement Date: 03/30/21  Current diet:  DIET RENAL Regular  Passing flatus: YES  Tolerating current diet: YES       Pain Management:   Patient states pain is manageable on current regimen: YES    Skin:  Yong Score: 22  Interventions: increase time out of bed    Patient Safety:  Fall Score:  Total Score: 1  Interventions: gripper socks       Length of Stay:  Expected LOS: 4d 19h  Actual LOS: [address]

## 2021-03-31 NOTE — PROGRESS NOTES
Problem: Falls - Risk of  Goal: *Absence of Falls  Description: Document Authmartha Aldridgeers Fall Risk and appropriate interventions in the flowsheet.   Outcome: Progressing Towards Goal  Note: Fall Risk Interventions:            Medication Interventions: Patient to call before getting OOB, Teach patient to arise slowly                   Problem: Patient Education: Go to Patient Education Activity  Goal: Patient/Family Education  Outcome: Progressing Towards Goal

## 2021-03-31 NOTE — PROGRESS NOTES
Pharmacy Daily Dosing of Warfarin    Indication: AFib    Goal INR: 2-3    PTA Warfarin Dose: warfarin 2.5 mg daily    Concurrent anticoagulants: NA    Concurrent antiplatelet: aspirin    Major Interacting Medications   Drugs that may increase INR: NA  Drugs that may decrease INR: NA    Conditions that may increase/decrease INR (CHF, C. diff, cirrhosis, thyroid disorder, hypoalbuminemia): NA    Labs:  Recent Labs     03/31/21  0139 03/30/21  0454 03/30/21  0345 03/29/21  0506   INR 1.4*  --  1.4* 1.7*   HGB  --  6.8*  --  7.7*   PLT  --  258  --  237   TBILI  --   --   --  0.6   ALB  --   --   --  3.4*     Impression/Plan:   Will order warfarin 4 mg PO x 1 dose. Daily INR  CBC w/o diff QOD     Pharmacy will follow daily and adjust the dose as appropriate. Thanks  Makenzie Baron, PHARMD      [email or web address]/[email or web address]/Warfarin%[email or web address]. pdf

## 2021-03-31 NOTE — PROGRESS NOTES
End of Shift Note    Bedside shift change report given to [employer] (oncoming nurse) by Clara Clark RN (offgoing nurse). Report included the following information SBAR, Kardex, Intake/Output, MAR and Recent Results    Shift worked:  7p-7a     Shift summary and any significant changes:     Pt received percocet for pain and zofran for nausea once. Pt ambulated in esparza frequently to get snacks. Gave report to dialysis this AM.   Concerns for physician to address:  None     Zone phone for oncoming shift:   [phone]       Activity:  Activity Level: Up ad fouzia  Number times ambulated in hallways past shift: 3  Number of times OOB to chair past shift: Slept in chair. Cardiac:   Cardiac Monitoring: Yes      Cardiac Rhythm: Normal sinus rhythm    Access:   Current line(s): PIV     Genitourinary:   Urinary status: voiding and anuric    Respiratory:   O2 Device: Room air  Chronic home O2 use?: N/A  Incentive spirometer at bedside: N/A     GI:  Last Bowel Movement Date: 03/30/21  Current diet:  DIET RENAL Regular  Passing flatus: YES  Tolerating current diet: YES       Pain Management:   Patient states pain is manageable on current regimen: YES    Skin:  Yong Score: 22  Interventions: increase time out of bed    Patient Safety:  Fall Score:  Total Score: 1  Interventions: gripper socks       Length of Stay:  Expected LOS: 4d 19h  Actual LOS: 2      Clara Clark, RN

## 2021-03-31 NOTE — PROGRESS NOTES
Nephrology Progress Note  Blaise Leblanc     [facility]  Phone - [phone]   Patient: Nigel Burnette    YOB: 1975        Date- 3/31/2021   Admit Date: 3/29/2021  CC: Follow up for  ESRD        IMPRESSION & PLAN:   · esrd- home HD 5 days per week- Follows up with Dr Cy Saunders at [facility]  · Failed RTX times 2  · Hyperkalemia  · Gram positive sepsis  · Anemia of ckd  · Hx of renal tx in past times 2.  · Hypertension  · CAD  · H/o DVT, s/p ivc filter/ h/o HIT     Plan:  -Plan for HD/UF today  -Vancomycin per ID.  -Ongoing work up for source of infection. EVER did not showed any vegetations,ongoing discussions about removing ICD. -Appreciated Dr Klaus Dove input, review of AVG duplex shows areas which may have a thin layer of perigraft fluid . Possible exploration later this week. -CT spine negative for OM. -EPO for Anemia     Subjective: Interval History:   -  Feels better  -  Seen on HD. Objective:   Vitals:    03/30/21 2021 03/30/21 2352 03/31/21 0521 03/31/21 0747   BP:  110/69 (!) 101/51    Pulse:  87 (!) 104 (!) 101   Resp:  16 16    Temp:  97.5 °F (36.4 °C) 97.4 °F (36.3 °C)    TempSrc:       SpO2:  99% 99%    Weight: 64.3 kg (141 lb 12.1 oz)      Height:          03/30 0701 - 03/31 0700  In: 280 [P.O.:280]  Out: 0   Last 3 Recorded Weights in this Encounter    03/30/21 1236 03/30/21 1354 03/30/21 2021   Weight: 65 kg (143 lb 4.8 oz) 65 kg (143 lb 4.8 oz) 64.3 kg (141 lb 12.1 oz)      Physical exam:   GEN: NAD  NECK- Supple, no mass  RESP: No wheezing, Clear b/l  CVS: S1,S2  RRR  NEURO: Normal speech, Non focal  EXT: No Edema   PSYCH: Normal Mood    Chart reviewed. Pertinent Notes reviewed.      Data Review :  Recent Labs     03/31/21  0139 03/30/21  0454 03/29/21  0506   * 133* 128*   K 4.3 4.2 5.2*   CL 94* 96* 89*   CO2 24 28 20*   BUN 41* 33* 65*   CREA 8.22* 6.49* 10.10*   * 87 78   CA 9.1 8.5 9.4   MG  --  2.2  --      Recent Labs     03/30/21  0454 03/29/21  0506   WBC 12.9* 17.8*   HGB 6.8* 7.7*   HCT 20.1* 23.0*    237     No results for input(s): FE, TIBC, PSAT, FERR in the last 72 hours.    Medication list  reviewed  Current Facility-Administered Medications   Medication Dose Route Frequency    WARFARIN INFORMATION NOTE (COUMADIN)   Other QPM    Vancomycin - Pharmacy to dose   Other Rx Dosing/Monitoring    atorvastatin (LIPITOR) tablet 20 mg  20 mg Oral DAILY    aspirin chewable tablet 81 mg  81 mg Oral DAILY    metoprolol succinate (TOPROL-XL) XL tablet 12.5 mg  12.5 mg Oral DAILY    pantoprazole (PROTONIX) tablet 40 mg  40 mg Oral ACB    calcitRIOL (ROCALTROL) capsule 0.5 mcg  0.5 mcg Oral DAILY    sodium chloride (NS) flush 5-40 mL  5-40 mL IntraVENous Q8H    sodium chloride (NS) flush 5-40 mL  5-40 mL IntraVENous PRN    acetaminophen (TYLENOL) tablet 650 mg  650 mg Oral Q6H PRN    Or    acetaminophen (TYLENOL) suppository 650 mg  650 mg Rectal Q6H PRN    polyethylene glycol (MIRALAX) packet 17 g  17 g Oral DAILY PRN    ondansetron (ZOFRAN ODT) tablet 4 mg  4 mg Oral Q8H PRN    Or    ondansetron (ZOFRAN) injection 4 mg  4 mg IntraVENous Q6H PRN    calcium acetate(phosphat bind) (PHOSLO) capsule 1,334 mg  2 Cap Oral TID WITH MEALS    epoetin emilie-epbx (RETACRIT) 12,000 Units combo injection  12,000 Units SubCUTAneous Q MON, WED & FRI    oxyCODONE-acetaminophen (PERCOCET) 5-325 mg per tablet 1 Tab  1 Tab Oral Q6H PRN          Naila Lugo MD              [facility]  [facility] / [facility]  [address] NoelleFormerly [facility], [address]  [place], [address]  Phone - [phone]               Fax - [phone]

## 2021-03-31 NOTE — PROGRESS NOTES
HD TRANSFER - OUT REPORT:    Verbal report given to Fern Caraballo [facility] Florencio on Patricio Passer being transferred to [facility] for routine progression of care       Report consisted of patient's Situation, Background, Assessment and   Recommendations(SBAR). Information from the following report(s) SBAR, Procedure Summary, Intake/Output and Cardiac Rhythm NSR-ST was reviewed with the receiving nurse. Method:  $$ Method: Hemodialysis (03/31/21 0746)    Fluid Removed  NET Fluid Removed (mL): 2500 ml (03/31/21 1116)     Patient response to treatment:  Stable    End Time  Hemodialysis End Time: 2238 (03/31/21 1116)  If not documented, dialysis nurse to update post-dialysis row in HD/Filtration flowsheet     Medications /Volume expansion agents or Fluid boluses administered during treatment? no    Post-dialysis medication administration due?  yes  Remind nurse to administer post-HD medication upon return to unit. Fistula hemostasis? yes    Line heparinization? no    Lines: SUZANNE AVG    Opportunity for questions and clarification was provided.       Patient transported with: [employer]

## 2021-03-31 NOTE — PROCEDURES
Winter Dialysis Team [facility]  [phone]    Vitals   Pre   Post   Assessment   Pre   Post     Temp  Temp: 98.7 °F (37.1 °C) (03/31/21 0746)  98.5  oral LOC  A&Ox4 A&Ox4   HR   Pulse (Heart Rate): (!) 101 (03/31/21 0747) 101 Lungs   clear clear   B/P   BP: 113/66 (03/31/21 0746) 108/79 Cardiac   Tele, NSR- ST Tele, NSR- ST   Resp   Resp Rate: 18 (03/31/21 0746) 18 Skin   BLE swelling BLE swelling   Pain level  Pain Intensity 1: 5 (03/31/21 0113) 0 Edema  2+ BLE     Trace BLE   Orders:    Duration:   Start:    2768 End:    1116 Total:   3.5hr   Dialyzer:   Dialyzer/Set Up Inspection: Jacy Christiansen (03/31/21 0746)   Charl Odor Bath:   Dialysate K (mEq/L): 2 (03/31/21 0746)   Ca Bath:   Dialysate CA (mEq/L): 2.5 (03/31/21 0746)   Na/Bicarb:   Dialysate NA (mEq/L): 138 (03/31/21 0746)   Target Fluid Removal:   Goal/Amount of Fluid to Remove (mL): 2500 mL (03/31/21 0746)   Access     Type & Location:   SUZANNE AVG: skin CDI. No s/s of infection. + B/T. No issues with cannulation or hemostasis. Running well at .     Labs     Obtained/Reviewed   Critical Results Called   Date when labs were drawn-  Hgb-    HGB   Date Value Ref Range Status   03/30/2021 6.8 (L) 12.1 - 17.0 g/dL Final     K-    Potassium   Date Value Ref Range Status   03/31/2021 4.3 3.5 - 5.1 mmol/L Final     Ca-   Calcium   Date Value Ref Range Status   03/31/2021 9.1 8.5 - 10.1 MG/DL Final     Bun-   BUN   Date Value Ref Range Status   03/31/2021 41 (H) 6 - 20 MG/DL Final     Creat-   Creatinine   Date Value Ref Range Status   03/31/2021 8.22 (H) 0.70 - 1.30 MG/DL Final        Medications/ Blood Products Given     Name   Dose   Route and Time     None ordered                Blood Volume Processed (BVP):    88.9L Net Fluid   Removed:  2500ml   Comments   Time Out Done: 0700  Primary Nurse Rpt Pre: Marilee Clements RN  Primary Nurse Rpt Post: Jcarlos Sneed RN  Pt Education: keep access arm still during HD  Care Plan: ongoing  Tx Summary:  Pt arrived to HD suite A&Ox4. Consent signed & on file. SBAR received from Primary RN. 3754: Pt cannulated with 48F needles per policy & without issue. VSS. Dialysis Tx initiated. * no issues during treatment*  1116: Tx ended. VSS. All possible blood returned to patient. Hemostasis achieved without issue. Bed locked and in the lowest position, call bell and belongings in reach. SBAR given to Primary, RN. Patient is stable at time of their departure. All Dialysis related medications have been reviewed. Admiting Diagnosis: Bacteremia  Pt's previous clinic- [facility]  Consent signed - Informed Consent Verified: Yes (03/31/21 9433)  Winter Consent - on file  Hepatitis Status- Immune 2/10/21  Machine #- Machine Number: [id number] (03/31/21 6259)  Telemetry status- NSR-ST  Pre-dialysis wt. -

## 2021-03-31 NOTE — PROGRESS NOTES
.      Hospitalist Progress Note    NAME: Samara Felix   :  1975   MRN:  [id number]       Assessment / Plan:  Gram-positive bacteremia (methicillin-resistant staph epidermidis) in 4 bottles 3/27  Had bacteremia in 2020, had femoral dialysis catheter for 2 months at the time (was tunneled catheter)  History of endocarditis  Transthoracic echocardiography with mobile structure in the left ventricle, ejection fraction is 30%mitral regurg  Coronary artery disease   ischemic cardiomyopathy with AICD  CT scan of the lumbar spine is not showing signs of osteomyelitis  Vascular surgery consulted mild fluid around AV graft. May consider exploration later. Cardiology were consulted and they are looking into the old ICD and leads. 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 Code status: Full  Prophylaxis: Coumadin  Recommended Disposition: Home w/Family     Subjective:     Chief Complaint / Reason for Physician Visit  \"feeling better\". To follow-up with Mr. Israel Pereyra with gram-positive bacteremia discussed with RN events overnight. Review of Systems:  Symptom Y/N Comments  Symptom Y/N Comments   Fever/Chills n   Chest Pain n    Poor Appetite n   Edema     Cough n   Abdominal Pain n    Sputum n   Joint Pain     SOB/ZAMUDIO n   Pruritis/Rash     Nausea/vomit n   Tolerating PT/OT     Diarrhea    Tolerating Diet     Constipation    Other       Could NOT obtain due to:      Objective:     VITALS:   Last 24hrs VS reviewed since prior progress note.  Most recent are:  Patient Vitals for the past 24 hrs:   Temp Pulse Resp BP SpO2   21 1116 98.5 °F (36.9 °C) (!) 101 18 108/79    21 1100  (!) 104 18 (!) 128/102    03/31/21 1045  (!) 106 18 (!) 145/102    03/31/21 1030  (!) 106 18 118/63    03/31/21 1015  (!) 104 18 116/60    03/31/21 1000  98 18 137/84    03/31/21 0945  100 18 122/73    03/31/21 0930  92 18 110/63    03/31/21 0915  94 18 (!) 104/58    03/31/21 0900  92 18 109/64    03/31/21 0845  94 18 113/61    03/31/21 0830  93 18 110/75    03/31/21 0815  93 18 114/71    03/31/21 0800  93 18 (!) 98/56    03/31/21 0747  (!) 101      03/31/21 0746 98.7 °F (37.1 °C) 93 18 113/66    03/31/21 0521 97.4 °F (36.3 °C) (!) 104 16 (!) 101/51 99 %   03/30/21 2352 97.5 °F (36.4 °C) 87 16 110/69 99 %   03/30/21 2003 97.8 °F (36.6 °C) 89 16 97/60 94 %   03/30/21 1648 98.5 °F (36.9 °C) 93 16 138/84 100 %   03/30/21 1425  86 16 110/65 100 %   03/30/21 1417  84 14 101/68 100 %   03/30/21 1414  86 16 110/61 100 %   03/30/21 1409  82 16 112/67 100 %   03/30/21 1404  85 14 108/64 100 %   03/30/21 1400  84 16 (!) 108/53 100 %   03/30/21 1355  82 15 101/66 100 %   03/30/21 1354    98/60    03/30/21 1350  83 17 103/64 100 %   03/30/21 1345  84 14 98/60 100 %   03/30/21 1340  84 17 (!) 98/59 99 %   03/30/21 1335  84 19 (!) 93/59 99 %   03/30/21 1330  87 15 96/64 98 %   03/30/21 1325  89 16 106/64 98 %   03/30/21 1320  84 17 103/70 99 %   03/30/21 1315  84 20 108/69 100 %   03/30/21 1310  80 13 (!) 101/57 100 %   03/30/21 1305  87 24 111/63 100 %   03/30/21 1300  86 13 130/67 100 %       Intake/Output Summary (Last 24 hours) at 3/31/2021 1258  Last data filed at 3/31/2021 1116  Gross per 24 hour   Intake 280 ml   Output 2500 ml   Net -2220 ml        I had a face to face encounter and independently examined this patient on 3/31/2021, as outlined below:  PHYSICAL EXAM:  General: WD, WN. Alert, cooperative, no acute distress    EENT:  EOMI. Anicteric sclerae. MMM  Resp:  CTA bilaterally, no wheezing or rales.   No accessory muscle use  CV:  Regular  rhythm,  No edema  GI:  Soft, Non distended, Non tender. +Bowel sounds  Neurologic:  Alert and oriented X 3, normal speech,   Psych:   Good insight. Not anxious nor agitated  Skin:  No rashes. No jaundice    Reviewed most current lab test results and cultures  YES  Reviewed most current radiology test results   YES  Review and summation of old records today    NO  Reviewed patient's current orders and MAR    YES  PMH/SH reviewed - no change compared to H&P  ________________________________________________________________________  Care Plan discussed with:    Comments   Patient x    Family      RN x    Care Manager     Consultant                        Multidiciplinary team rounds were held today with , nursing, pharmacist and clinical coordinator. Patient's plan of care was discussed; medications were reviewed and discharge planning was addressed. ________________________________________________________________________  Total NON critical care TIME:  22   Minutes    Total CRITICAL CARE TIME Spent:   Minutes non procedure based      Comments   >50% of visit spent in counseling and coordination of care x    ________________________________________________________________________  Dana Montero MD     Procedures: see electronic medical records for all procedures/Xrays and details which were not copied into this note but were reviewed prior to creation of Plan. LABS:  I reviewed today's most current labs and imaging studies.   Pertinent labs include:  Recent Labs     03/30/21  0454 03/29/21  0506   WBC 12.9* 17.8*   HGB 6.8* 7.7*   HCT 20.1* 23.0*    237     Recent Labs     03/31/21  0139 03/30/21  0454 03/30/21  0345 03/29/21  0506   * 133*  --  128*   K 4.3 4.2  --  5.2*   CL 94* 96*  --  89*   CO2 24 28  --  20*   * 87  --  78   BUN 41* 33*  --  65*   CREA 8.22* 6.49*  --  10.10*   CA 9.1 8.5  --  9.4   MG  --  2.2  --   --    ALB  --   --   --  3.4*   TBILI  --   --   --  0.6 ALT  --   --   --  14   INR 1.4*  --  1.4* 1.7*       Signed: Sammie Orona MD

## 2021-03-31 NOTE — PROGRESS NOTES
Lab called in results for blood cultures obtained yesterday, 3/30. Staphylococcus was found in all 4 bottles.

## 2021-03-31 NOTE — PROGRESS NOTES
TRANSFER - IN REPORT:    Verbal report received from MANUELITO Sol on Laila Foots  being received from Gen-Surg for ordered procedure      Report consisted of patients Situation, Background, Assessment and   Recommendations(SBAR). Information from the following report(s) SBAR, Kardex and Cardiac Rhythm NSR-ST was reviewed with the receiving nurse. Opportunity for questions and clarification was provided. Assessment completed upon patients arrival to unit and care assumed.

## 2021-03-31 NOTE — CONSULTS
EP/ ARRHYTHMIA CONSULT requested secondary to bacteremia and two ICD implants    Patient ID:  Patient: Nubia Munoz  MRN: [id number]  Age: 39 y.o.  : 1975    Date of  Admission: 3/29/2021  4:00 AM   PCP:  Anibal Chavez MD   Usual cardiologist:  Manav Dale MD    Assessment: 1. 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 EVER negative for overt endocarditis. Now on vancomycin. 2. Remote SJM single chamber ICD implant (dual coil shock lead) in  with gen change in 2016 due to battery depletion, subsequent diagnosis of lead fracture, system turned OFF, subcutaneous [employer] ICD implanted at [facility]. 3. Ischemic cardiomyopathy, primary prevention indication for ICD implant. 4. CAD with anterior MI and remote stenting in . Chronic aspirin therapy. 5. Chronic systolic CHF. 6. Remote history of endocarditis. 7. Remote history of IVC filter due to lower extremity DVT. Chronic anticoagulation. 8. ESRD with dialysis. Remote renal transplant x 2 due to nephrotic syndrome. Failed, chronic hemodialysis. Lysbeth Carrel 9. History of heparin associated thrombocytopenia. 10. Anemia due to chronic renal disease, Hgb 6.8 today. Normal platelets. 11. Full code. Plan:     1. Endovascular SJM ICD extraction is reasonable but not without risk, but this would have to be done at another hospital.  Would be great if the subcutaneous ICD could stay in place, that could come out too if a problem in the future. 2. He is not pacemaker-dependent nor has he received therapy for VT-VF. I discussed the issues with him, he's on board with whatever is recommended. I'll probe to see if our practice is interested in pursuing this or if better done at [facility]. Continue antibiotic therapy. [x]       High complexity decision making was performed in this patient at high risk for decompensation with multiple organ involvement.     Nubia Munoz is a 39 y.o. male with a history of the above.  I was asked by Dr. Edelmira Casillas to see him in regards to risk and benefits of system extraction. He has a LUE dialysis graft which is being utilized. Seen by vascular surgery, no obvious signs of infection there. No chest pain, syncope, dizziness, or palpitations. No worsening orthopnea, PND, or edema. No TIA or stroke symptoms. Past Medical History:   Diagnosis Date    Abdominal hematoma     AICD (automatic cardioverter/defibrillator) present     CAD (coronary artery disease)     anterior MI s/p 2 stents  2007    Chronic abdominal pain     Chronic kidney disease     ESRD; Dialysis dependent.  Home [facility] does labs- [facility] tpke    DVT (deep venous thrombosis) ([address] 2001    DVT of popliteal vein ([address] 11/2011    not anticoagulated due to bleeding, IVC filter    Endocarditis     Gallstone pancreatitis     Gastrointestinal disorder     acid reflux    Gastrointestinal disorder     peptic ulcer    Hemodialysis patient ([address]     High cholesterol     Hypertension     Kidney transplant     b/l kidneys 1995, 2001    Long term current use of anticoagulant therapy     Nephrotic syndrome     Other ill-defined conditions(799.89)     kidny transplant x2,  on dialysis    Other ill-defined conditions(799.89)     home dialysis    Other ill-defined conditions(799.89)     hx recurrent left leg DVT    Peritonitis ([address]     Seizures ([address] 2015    most recent- only had three in lifetime    Small bowel obstruction (HCC)     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    [facility])         Past Surgical History:   Procedure Laterality Date    CARDIAC SURG PROCEDURE UNLIST  2007    2 stents    HX APPENDECTOMY      HX CHOLECYSTECTOMY      HX OTHER SURGICAL      cholecystectomy    HX OTHER SURGICAL  11/2011    exploratory laparotomy    HX OTHER SURGICAL      fem-pop    HX OTHER SURGICAL  02/2013    right upper extremity fistula    HX PACEMAKER Left 6/2009    AICD-st latonya-not connected    HX PACEMAKER Left     AICD-left side-BS-working    HX SMALL BOWEL RESECTION      HX TRANSPLANT  2001     Kidney    HX TRANSPLANT  1992    kidney    HX UROLOGICAL      2 kidney transplants    HX VASCULAR ACCESS Right     femoral access for HD- does 5 day dialysis @ home    IR REMOVE TUNL CVAD W/O PORT / PUMP  10/29/2020    IR REPLACE CVC TUNNELED W/O PORT  9/10/2020    [place] EDG US EXAM SURGICAL ALTER STOM DUODENUM/JEJUNUM  7/15/2011         [place] ESOPHAGOGASTRODUODENOSCOPY TRANSORAL DIAGNOSTIC  5/24/2012         VASCULAR SURGERY PROCEDURE UNLIST  11/14/2017    Insertion AV graft right upper arm (bovine); ligation of AV fistula right arm       Social History     Tobacco Use    Smoking status: Never Smoker    Smokeless tobacco: Never Used   Substance Use Topics    Alcohol use: No        Family History   Problem Relation Age of Onset    COPD Mother     Lung Disease Mother     Cancer Father         stomach    Cancer Maternal Grandmother         Allergies   Allergen Reactions    Shellfish Containing Products Swelling     Break out in rash, trouble breathing    Contrast Agent [Iodine] Shortness of Breath    Heparin Analogues Other (comments)     Positive history of HIT          Current Facility-Administered Medications   Medication Dose Route Frequency    WARFARIN INFORMATION NOTE (COUMADIN)   Other QPM    Vancomycin - Pharmacy to dose   Other Rx Dosing/Monitoring    atorvastatin (LIPITOR) tablet 20 mg  20 mg Oral DAILY    aspirin chewable tablet 81 mg  81 mg Oral DAILY    metoprolol succinate (TOPROL-XL) XL tablet 12.5 mg  12.5 mg Oral DAILY    pantoprazole (PROTONIX) tablet 40 mg  40 mg Oral ACB    calcitRIOL (ROCALTROL) capsule 0.5 mcg  0.5 mcg Oral DAILY    sodium chloride (NS) flush 5-40 mL  5-40 mL IntraVENous Q8H    sodium chloride (NS) flush 5-40 mL  5-40 mL IntraVENous PRN    acetaminophen (TYLENOL) tablet 650 mg  650 mg Oral Q6H PRN    Or    acetaminophen (TYLENOL) suppository 650 mg  650 mg Rectal Q6H PRN    polyethylene glycol (MIRALAX) packet 17 g  17 g Oral DAILY PRN    ondansetron (ZOFRAN ODT) tablet 4 mg  4 mg Oral Q8H PRN    Or    ondansetron (ZOFRAN) injection 4 mg  4 mg IntraVENous Q6H PRN    calcium acetate(phosphat bind) (PHOSLO) capsule 1,334 mg  2 Cap Oral TID WITH MEALS    epoetin emilie-epbx (RETACRIT) 12,000 Units combo injection  12,000 Units SubCUTAneous Q MON, WED & FRI    oxyCODONE-acetaminophen (PERCOCET) 5-325 mg per tablet 1 Tab  1 Tab Oral Q6H PRN       Review of Symptoms:  See HPI as well.   General: +fever, chills, sweats, negative for weakness, weight loss   Eyes: negative for blurred vision, eye pain, loss of vision, diplopia   Ear Nose and Throat: negative for rhinorrhea, pharyngitis, otalgia, tinnitus, speech or swallowing difficulties   Respiratory: negative for cough, sputum production, wheezing, hemoptysis, or pleuritic pain   Cardiology: negative for chest pain, palpitations, orthopnea, PND, edema, syncope   Gastrointestinal: negative for abdominal pain, N/V, dysphagia, change in bowel habits, bleeding   Genitourinary: negative for frequency, urgency, dysuria, hematuria, incontinence   Muskuloskeletal : negative for arthralgia, myalgia   Hematology: negative for easy bruising, bleeding, lymphadenopathy   Dermatological: negative for rash, ulceration, mole change, new lesion   Endocrine: negative for hot flashes or polydipsia   Neurological: negative for headache, dizziness, confusion, focal weakness, paresthesia, memory loss, gait disturbance   Psychological: negative for anxiety, depression, agitation       Objective:      Physical Exam:  Temp (24hrs), Av °F (36.7 °C), Min:97.4 °F (36.3 °C), Max:98.5 °F (36.9 °C)    Patient Vitals for the past 8 hrs:   Pulse   21 89   21 1648 93    Patient Vitals for the past 8 hrs:   Resp   21 16   21 1648 16    Patient Vitals for the past 8 hrs:   BP   21 97/60   03/30/21 1648 138/84          Intake/Output Summary (Last 24 hours) at 3/30/2021 8687  Last data filed at 3/30/2021 1756  Gross per 24 hour   Intake 280 ml   Output    Net 280 ml       Nondiaphoretic, not in acute distress. Supple, no palpable thyromegaly. No scleral icterus, mucous membranes moist, conjuctivae pink, no xanthelasma. L chest ICD site without evidence of pocket infection. L axillary ICD site without evidence of pocket infection. Unlabored, clear to auscultation bilaterally anteriorly, symmetric air movement. Regular rate and rhythm, no murmur, pericardial rub, knock, or gallop. No JVD but mild lower extremity peripheral edema. No carotid bruit. Palpable radial pulses bilaterally. Abdomen, soft, nontender, nondistended. No abdominal bruit or pulstatile masses. Extremities without cyanosis or clubbing. Muscle tone and bulk normal.  Skin warm and dry. No rashes or ulcers. Neuro grossly nonfocal.  No tremor. Awake and appropriate. CARDIOGRAPHICS and STUDIES, I reviewed:    Telemetry:  SR.    ECG:  Reviewed. Labs:  Recent Labs     03/29/21  0506   TROIQ <0.05     Lab Results   Component Value Date/Time    Cholesterol, total 139 05/22/2012 05:00 PM    HDL Cholesterol 32 05/22/2012 05:00 PM    LDL, calculated 75.8 05/22/2012 05:00 PM    Triglyceride 156 (H) 05/22/2012 05:00 PM    CHOL/HDL Ratio 4.3 05/22/2012 05:00 PM     Recent Labs     03/30/21  0345 03/29/21  0506   INR 1.4* 1.7*   PTP 14.5* 16.9*      Recent Labs     03/30/21  0454 03/29/21  0506   * 128*   K 4.2 5.2*   CL 96* 89*   CO2 28 20*   BUN 33* 65*   CREA 6.49* 10.10*   GLU 87 78   CA 8.5 9.4   ALB  --  3.4*   WBC 12.9* 17.8*   HGB 6.8* 7.7*   HCT 20.1* 23.0*    237     Recent Labs     03/29/21  0506   *   TP 8.6*   ALB 3.4*   GLOB 5.2*     No components found for: GLPOC  No results for input(s): PH, PCO2, PO2 in the last 72 hours.         Galilea Stevenson MD  3/30/2021 pt unable to answer

## 2021-03-31 NOTE — PROGRESS NOTES
Vascular Surgery Progress Note  Sebastian Caceres ACNP-BC  3/31/2021       Subjective:     Mark Barbosa is a 39 y.o.  with a hx of ASHD, AICD, HTN, HLD, ESRF, Recurrent DVT s/p IVC, GERD, and SBO. He is admitted to the hospital with sepsis secondary to bacteremia. He has a hx of endocarditis and was diagnosed with MRSE bacteremia 11/2020 due to a femoral dialysis catheter. We have been asked to evaluate for RUE AVG infection. He is s/p creation of a LUE [place] AVG 9/2020. Patient is tachycardic this am.  He is afebrile with a nl RR. His WBC count is trending down.        Nursing Data:     Patient Vitals for the past 24 hrs:   BP Temp Temp src Pulse Resp SpO2 Weight   03/31/21 1116 108/79 98.5 °F (36.9 °C) Oral (!) 101 18     03/31/21 1100 (!) 128/102   (!) 104 18     03/31/21 1045 (!) 145/102   (!) 106 18     03/31/21 1030 118/63   (!) 106 18     03/31/21 1015 116/60   (!) 104 18     03/31/21 1000 137/84   98 18     03/31/21 0945 122/73   100 18     03/31/21 0930 110/63   92 18     03/31/21 0915 (!) 104/58   94 18     03/31/21 0900 109/64   92 18     03/31/21 0845 113/61   94 18     03/31/21 0830 110/75   93 18     03/31/21 0815 114/71   93 18     03/31/21 0800 (!) 98/56   93 18     03/31/21 0747    (!) 101      03/31/21 0746 113/66 98.7 °F (37.1 °C) Oral 93 18     03/31/21 0521 (!) 101/51 97.4 °F (36.3 °C)  (!) 104 16 99 %    03/30/21 2352 110/69 97.5 °F (36.4 °C)  87 16 99 %    03/30/21 2021       64.3 kg (141 lb 12.1 oz)   03/30/21 2003 97/60 97.8 °F (36.6 °C)  89 16 94 %    03/30/21 1648 138/84 98.5 °F (36.9 °C)  93 16 100 %    03/30/21 1425 110/65   86 16 100 %    03/30/21 1417 101/68   84 14 100 %    03/30/21 1414 110/61   86 16 100 %    03/30/21 1409 112/67   82 16 100 %      ---------------------------------------------------------------------------------------------------------    Intake/Output Summary (Last 24 hours) at 3/31/2021 1405  Last data filed at 3/31/2021 1116  Gross per 24 hour   Intake 280 ml   Output 2500 ml   Net -2220 ml       Exam:     Constititional: He is receiving HD. He is asleep. HENT:      Head: Normocephalic. Nose: Nose normal.      Mouth/Throat:      Mouth: Mucous membranes are moist.   Eyes:      Pupils: Pupils are equal, round, and reactive to light. Neck:      Musculoskeletal: Normal range of motion. Cardiovascular:      Rate and Rhythm: Normal rate and regular rhythm. Comments: RUE AVG with palpable thrill and no erythema or edema. Pulmonary:      Effort: Pulmonary effort is normal. No respiratory distress. Abdominal:      General: Abdomen is flat. There is no distension. Musculoskeletal: Normal range of motion. Skin:     General: Skin is warm. Neurological:      General: No focal deficit present. Mental Status: He is alert. Mental status is at baseline. Psychiatric:         Mood and Affect: Mood normal.         Behavior: Behavior normal.     Lab Review:     .   Recent Results (from the past 24 hour(s))   CULTURE, BLOOD    Collection Time: 03/30/21  3:57 PM    Specimen: Blood   Result Value Ref Range    Special Requests: NO SPECIAL REQUESTS      Culture result: NO GROWTH AFTER 15 HOURS     CULTURE, BLOOD    Collection Time: 03/30/21  3:57 PM    Specimen: Blood   Result Value Ref Range    Special Requests: NO SPECIAL REQUESTS      Culture result: NO GROWTH AFTER 15 HOURS     PROTHROMBIN TIME + INR    Collection Time: 03/31/21  1:39 AM   Result Value Ref Range    INR 1.4 (H) 0.9 - 1.1      Prothrombin time 14.1 (H) 9.0 - 10.9 sec   METABOLIC PANEL, BASIC    Collection Time: 03/31/21  1:39 AM   Result Value Ref Range    Sodium 131 (L) 136 - 145 mmol/L    Potassium 4.3 3.5 - 5.1 mmol/L    Chloride 94 (L) 97 - 108 mmol/L    CO2 24 21 - 32 mmol/L    Anion gap 13 5 - 15 mmol/L    Glucose 101 (H) 65 - 100 mg/dL    BUN 41 (H) 6 - 20 MG/DL    Creatinine 8.22 (H) 0.70 - 1.30 MG/DL    BUN/Creatinine ratio 5 (L) 12 - 20      GFR est AA 9 (L) >60 ml/min/1.73m2    GFR est non-AA 7 (L) >60 ml/min/1.73m2    Calcium 9.1 8.5 - 10.1 MG/DL          Assessment/Plan:      Sepsis  -leukocytosis improving   Recurrent MSSA Bacteremia 3/29  -repeat blood cx 3/30: NG   Hx of Endocarditis (mobile structure in the left ventricle)   -\"EVER neg for overt endocarditis\"  -Hx of MRSE bacteremia 11/2020 (right femoral catheter removed)  -Hx of AICD 2009 (system turned off due to lead fx & subcutaneous [employer] ICD implanted at [facility]. -Hx of LUE [place]-Ryan AVG  -Hx of RUE bovine AVG  -Hx of multiple RUE stents w/o access to direct blood flow  -Hx of IVC filter  - US w/ thin layer of graft fluid   -Cardiology consulted  · No obvious s/s of LUE AVG infection. · Will await cardiology plan of care prior to consider exploration of AVG. · Antibx per infectious disease.      PAD  -left femoral to popliteal vein bypass  -ASA and statin    · Feet are perfused   · Routine outpatient f/u      ESRF  -s/p renal cell transplant x2 (1992 & 2001)  -home HD 5 days per week [facility]  Hypoantremia   Hyperkalemia   -resolved  Anemia of chronic renal disease   -Retacrit  · Plan per nephrology     HTN  -labile   HLD  ASHD  -ASA, BBlocker, & statin   -remote hx of stenting   Ischemic Cardiomyopathy  Chronic systolic CHF   -EF 24-17%    AICD  -#2 ICD present     Seizure disorder     GERD  -PPI     VTE Prophylaxis:  Recurrent DVT  -s/p IVC  -warfarin   Hx of H. I.T  Warfarin subtherapeutic   SCDs contraindicated in PAD  Encourage OOB       Disposition:  TBD

## 2021-04-01 ENCOUNTER — ANESTHESIA EVENT (OUTPATIENT)
Dept: SURGERY | Age: 46
DRG: 252 | End: 2021-04-01
Payer: MEDICARE

## 2021-04-01 LAB
ANION GAP SERPL CALC-SCNC: 9 MMOL/L (ref 5–15)
BACTERIA SPEC CULT: ABNORMAL
BUN SERPL-MCNC: 21 MG/DL (ref 6–20)
BUN/CREAT SERPL: 4 (ref 12–20)
CALCIUM SERPL-MCNC: 9 MG/DL (ref 8.5–10.1)
CHLORIDE SERPL-SCNC: 93 MMOL/L (ref 97–108)
CO2 SERPL-SCNC: 28 MMOL/L (ref 21–32)
CREAT SERPL-MCNC: 5.37 MG/DL (ref 0.7–1.3)
GLUCOSE SERPL-MCNC: 106 MG/DL (ref 65–100)
HCT VFR BLD AUTO: 24 % (ref 36.6–50.3)
HGB BLD-MCNC: 7.7 G/DL (ref 12.1–17)
INR PPP: 1.6 (ref 0.9–1.1)
MAGNESIUM SERPL-MCNC: 2.2 MG/DL (ref 1.6–2.4)
PHOSPHATE SERPL-MCNC: 3.5 MG/DL (ref 2.6–4.7)
POTASSIUM SERPL-SCNC: 4.3 MMOL/L (ref 3.5–5.1)
PROTHROMBIN TIME: 15.9 SEC (ref 9–11.1)
SERVICE CMNT-IMP: ABNORMAL
SODIUM SERPL-SCNC: 130 MMOL/L (ref 136–145)

## 2021-04-01 PROCEDURE — 87040 BLOOD CULTURE FOR BACTERIA: CPT

## 2021-04-01 PROCEDURE — 99222 1ST HOSP IP/OBS MODERATE 55: CPT | Performed by: STUDENT IN AN ORGANIZED HEALTH CARE EDUCATION/TRAINING PROGRAM

## 2021-04-01 PROCEDURE — 74011250637 HC RX REV CODE- 250/637: Performed by: STUDENT IN AN ORGANIZED HEALTH CARE EDUCATION/TRAINING PROGRAM

## 2021-04-01 PROCEDURE — 87077 CULTURE AEROBIC IDENTIFY: CPT

## 2021-04-01 PROCEDURE — 36415 COLL VENOUS BLD VENIPUNCTURE: CPT

## 2021-04-01 PROCEDURE — 83735 ASSAY OF MAGNESIUM: CPT

## 2021-04-01 PROCEDURE — 85018 HEMOGLOBIN: CPT

## 2021-04-01 PROCEDURE — 85610 PROTHROMBIN TIME: CPT

## 2021-04-01 PROCEDURE — 74011250637 HC RX REV CODE- 250/637: Performed by: INTERNAL MEDICINE

## 2021-04-01 PROCEDURE — 84100 ASSAY OF PHOSPHORUS: CPT

## 2021-04-01 PROCEDURE — 65270000029 HC RM PRIVATE

## 2021-04-01 PROCEDURE — 80048 BASIC METABOLIC PNL TOTAL CA: CPT

## 2021-04-01 PROCEDURE — 87186 SC STD MICRODIL/AGAR DIL: CPT

## 2021-04-01 RX ORDER — LANOLIN ALCOHOL/MO/W.PET/CERES
3 CREAM (GRAM) TOPICAL
Status: DISCONTINUED | OUTPATIENT
Start: 2021-04-01 | End: 2021-04-10 | Stop reason: HOSPADM

## 2021-04-01 RX ORDER — WARFARIN 2 MG/1
4 TABLET ORAL ONCE
Status: ACTIVE | OUTPATIENT
Start: 2021-04-01 | End: 2021-04-02

## 2021-04-01 RX ADMIN — PANTOPRAZOLE SODIUM 40 MG: 40 TABLET, DELAYED RELEASE ORAL at 06:25

## 2021-04-01 RX ADMIN — OXYCODONE HYDROCHLORIDE AND ACETAMINOPHEN 1 TABLET: 5; 325 TABLET ORAL at 04:15

## 2021-04-01 RX ADMIN — Medication 3 MG: at 22:56

## 2021-04-01 RX ADMIN — CALCIUM ACETATE 1334 MG: 667 CAPSULE ORAL at 08:48

## 2021-04-01 RX ADMIN — OXYCODONE HYDROCHLORIDE AND ACETAMINOPHEN 1 TABLET: 5; 325 TABLET ORAL at 10:52

## 2021-04-01 RX ADMIN — Medication 10 ML: at 14:00

## 2021-04-01 RX ADMIN — Medication 10 ML: at 06:24

## 2021-04-01 RX ADMIN — ASPIRIN 81 MG: 81 TABLET, CHEWABLE ORAL at 08:48

## 2021-04-01 RX ADMIN — CALCIUM ACETATE 1334 MG: 667 CAPSULE ORAL at 14:59

## 2021-04-01 RX ADMIN — METOPROLOL SUCCINATE 12.5 MG: 25 TABLET, EXTENDED RELEASE ORAL at 08:48

## 2021-04-01 RX ADMIN — OXYCODONE HYDROCHLORIDE AND ACETAMINOPHEN 1 TABLET: 5; 325 TABLET ORAL at 18:08

## 2021-04-01 RX ADMIN — Medication 10 ML: at 22:55

## 2021-04-01 RX ADMIN — CALCITRIOL CAPSULES 0.25 MCG 0.5 MCG: 0.25 CAPSULE ORAL at 08:48

## 2021-04-01 RX ADMIN — ATORVASTATIN CALCIUM 20 MG: 20 TABLET, FILM COATED ORAL at 08:48

## 2021-04-01 NOTE — PROGRESS NOTES
Nephrology Progress Note  Blaise Leblanc     [email or web address]. [email or web address]  Phone - [phone]   Patient: Lore Vazquez    YOB: 1975        Date- 4/1/2021   Admit Date: 3/29/2021  CC: Follow up for ESRD        IMPRESSION & PLAN:   · End-stage renal disease home HD 5 days per week- Follows up with Dr Ninfa Muro at [facility]  · Failed RTX times 2  · Hyperkalemia  · Gram positive sepsis  · Anemia of ckd  · Hx of renal tx in past times 2.  · Hypertension  · CAD  · H/o DVT, s/p ivc filter/ h/o HIT     Plan:  -No dialysis today  -Plan to continue Monday Wednesday Friday dialysis  -Vancomycin per ID.  -Ongoing work up for source of infection. -Appreciated Dr Pedro Currie input, review of AVG duplex shows areas which may have a thin layer of perigraft fluid . Subjective: Interval History:   -  Feels better  -S/p dialysis yesterday    Objective:   Vitals:    04/01/21 0349 04/01/21 0625 04/01/21 0732 04/01/21 0800   BP: 106/60  (!) 113/59    Pulse: (!) 104  94    Resp: 20  20    Temp: 98.5 °F (36.9 °C)  98.5 °F (36.9 °C)    TempSrc:       SpO2: 100%  100% 100%   Weight:  65.4 kg (144 lb 2.9 oz)     Height:          03/31 0701 - 04/01 0700  In: 250 [P.O.:150; I.V.:100]  Out: 2500   Last 3 Recorded Weights in this Encounter    03/30/21 1354 03/30/21 2021 04/01/21 0625   Weight: 65 kg (143 lb 4.8 oz) 64.3 kg (141 lb 12.1 oz) 65.4 kg (144 lb 2.9 oz)      Physical exam:   GEN: NAD  NECK- Supple, no mass  RESP: No wheezing, clear b/l  CVS: S1,S2  RRR  NEURO: Normal speech, nonfocal   EXT: No Edema   PSYCH: Normal Mood    Chart reviewed. Pertinent Notes reviewed.      Data Review :  Recent Labs     04/01/21  0150 03/31/21  0139 03/30/21  0454   * 131* 133*   K 4.3 4.3 4.2   CL 93* 94* 96*   CO2 28 24 28   BUN 21* 41* 33*   CREA 5.37* 8.22* 6.49*   * 101* 87   CA 9.0 9.1 8.5   MG 2.2  --  2.2   PHOS 3.5  --   --      Recent Labs     03/30/21  0454   WBC 12.9*   HGB 6.8* HCT 20.1*        No results for input(s): FE, TIBC, PSAT, FERR in the last 72 hours.    Medication list  reviewed  Current Facility-Administered Medications   Medication Dose Route Frequency    WARFARIN INFORMATION NOTE (COUMADIN)   Other QPM    Vancomycin - Pharmacy to dose   Other Rx Dosing/Monitoring    atorvastatin (LIPITOR) tablet 20 mg  20 mg Oral DAILY    aspirin chewable tablet 81 mg  81 mg Oral DAILY    metoprolol succinate (TOPROL-XL) XL tablet 12.5 mg  12.5 mg Oral DAILY    pantoprazole (PROTONIX) tablet 40 mg  40 mg Oral ACB    calcitRIOL (ROCALTROL) capsule 0.5 mcg  0.5 mcg Oral DAILY    sodium chloride (NS) flush 5-40 mL  5-40 mL IntraVENous Q8H    sodium chloride (NS) flush 5-40 mL  5-40 mL IntraVENous PRN    acetaminophen (TYLENOL) tablet 650 mg  650 mg Oral Q6H PRN    Or    acetaminophen (TYLENOL) suppository 650 mg  650 mg Rectal Q6H PRN    polyethylene glycol (MIRALAX) packet 17 g  17 g Oral DAILY PRN    ondansetron (ZOFRAN ODT) tablet 4 mg  4 mg Oral Q8H PRN    Or    ondansetron (ZOFRAN) injection 4 mg  4 mg IntraVENous Q6H PRN    calcium acetate(phosphat bind) (PHOSLO) capsule 1,334 mg  2 Cap Oral TID WITH MEALS    epoetin emilie-epbx (RETACRIT) 12,000 Units combo injection  12,000 Units SubCUTAneous Q MON, WED & FRI    oxyCODONE-acetaminophen (PERCOCET) 5-325 mg per tablet 1 Tab  1 Tab Oral Q6H PRN          Delicia Garnett MD              [facility]  [facility] [address], [address]  Phone - [phone]               Fax - [phone]

## 2021-04-01 NOTE — PROGRESS NOTES
Infectious Disease Progress Note         Interval:  NAEO    Subjective:   Feels OK but tired. Said he has not been able to sleep any night here. Denied f/c.         Objective:    Vitals:   Patient Vitals for the past 24 hrs:   Temp Pulse Resp BP SpO2   04/01/21 1236 98.7 °F (37.1 °C) 100 20 115/66 100 %   04/01/21 0800     100 %   04/01/21 0732 98.5 °F (36.9 °C) 94 20 (!) 113/59 100 %   04/01/21 0349 98.5 °F (36.9 °C) (!) 104 20 106/60 100 %   04/01/21 0020 98.2 °F (36.8 °C) (!) 102 20 110/67 99 %   03/31/21 2022 98.5 °F (36.9 °C) (!) 102 20 (!) 104/59 99 %   03/31/21 1814 100 °F (37.8 °C) (!) 104 17 101/61 97 %       Physical Exam:  ? GEN: NAD  ? HEENT: Normocephalic, atraumatic, PERRL, no scleral icterus  ? CV: S1, S2 heard regularly, no murmurs, + thrill at the AV graft left arm, pacemaker sites appear benign - both the left lateral chest and the anterior chest region. ? Lungs: Clear to auscultation bilaterally  ? Abdomen: soft, non distended, non tende  ? Genitourinary: no oneal  ? Extremities: no edema  ? Neuro: Alert, oriented to time, place and situation, moves all extremities to commands, verbal   ? Skin: no rash  ? Psych: good affect, good eye contact, non tearful   ?  Lines: left arm AV graft        Labs:  Recent Results (from the past 24 hour(s))   PROTHROMBIN TIME + INR    Collection Time: 04/01/21  1:50 AM   Result Value Ref Range    INR 1.6 (H) 0.9 - 1.1      Prothrombin time 15.9 (H) 9.0 - 43.0 sec   METABOLIC PANEL, BASIC    Collection Time: 04/01/21  1:50 AM   Result Value Ref Range    Sodium 130 (L) 136 - 145 mmol/L    Potassium 4.3 3.5 - 5.1 mmol/L    Chloride 93 (L) 97 - 108 mmol/L    CO2 28 21 - 32 mmol/L    Anion gap 9 5 - 15 mmol/L    Glucose 106 (H) 65 - 100 mg/dL    BUN 21 (H) 6 - 20 MG/DL    Creatinine 5.37 (H) 0.70 - 1.30 MG/DL    BUN/Creatinine ratio 4 (L) 12 - 20      GFR est AA 14 (L) >60 ml/min/1.73m2    GFR est non-AA 12 (L) >60 ml/min/1.73m2    Calcium 9.0 8.5 - 10.1 MG/DL MAGNESIUM    Collection Time: 04/01/21  1:50 AM   Result Value Ref Range    Magnesium 2.2 1.6 - 2.4 mg/dL   PHOSPHORUS    Collection Time: 04/01/21  1:50 AM   Result Value Ref Range    Phosphorus 3.5 2.6 - 4.7 MG/DL   HGB & HCT    Collection Time: 04/01/21  3:57 PM   Result Value Ref Range    HGB 7.7 (L) 12.1 - 17.0 g/dL    HCT 24.0 (L) 36.6 - 50.3 %           Micro:  3/27: Blood cultures 4/4 bottles with Staph epidermidis (Methicillin-R) , Willian Cov 2 negative   3/29: Blood cultures 4/4 bottles positive for CoNS  3/30: Blood cultures with GPC.   4/1: Blood cultures sent. Assessment:  40 yo M with:        - Sepsis due to high-grade CoNS bacteremia this admission.   - CAD with MI with 2 AICD placements (2009 - non-functioning due to fractured lead, then a subcutaneous ICD placement in 2019): patient has both devices in him. - High suspicion for endovascular ICD infection given high grade of the bacteremia, recurrence of the bacteremia with Staph epidermidis (MRSE bacteremia is 10/2020 as well). - Possible AV graft infection   - Nephrotic syndome s/p 2 renal transplants. Both transplants failed due to graft-medication non-compliance (last transplant in 2001). - ESRD on iHD, 5 times a week from home since 2012. In 09/2020 had left AV graft placed since complicated by clotting issues. -  MSSA bacteremia in 2017  - MRSE bacteremia in 10/2020 likely femoral HD line-related and possible L1-L2 discitis/osteomyelitis treated with 6 weeks of vancomycin. Since 11/2020, the graft is being used for dialysis.       Recommendations:  - Endovascular ICD removal is indicated given recurrent, and high-grade MRSE bacteremia. In addition, the graft is also possibly infected. Vascular surgery and EP cardiology are discussing the case for further management with respect to the graft and endovascular ICD.  Case has been discussed in detail with both Dr. Merlin Castano and Dr. Millie Kinney.   - Plan for AVG exploration for 4/2/21.  - No signs of infectious involvement of the subcutaneous-ICD clinically on exam. Since all the components of this device are subcutaneous and no exposure to vascular space, suspicion on infectious involvement is low right now and hence extraction is not indicated at this time. Continue to monitor for signs of pocket infection for this device. - F/u BCx from 3/30 and 4/1. Obtain more if 4/1 BCx turn positive. - Continue vancomycin goal trough 15-20  - Duration of vancomycin will be atleast 6 weeks, start date of therapy date yet to be seen. - Discussed with Dr. Millie Romo. Will follow. This is a high complexity patient. I personally spent 55 minutes or more of time in caring for this patient. This is time spent at this patient's bedside actively involved in patient care as well as the review of pertinent literature, and coordination of care and discussions with the patient's family and other medical specialists. Thank you for the opportunity to participate in the care of this patient. Please contact with questions or concerns.       Janet Lucas MD  Infectious Diseases

## 2021-04-01 NOTE — PROGRESS NOTES
End of Shift Note    Bedside shift change report given to [employer] (oncoming nurse) by Tiny Hidalgo RN (offgoing nurse). Report included the following information SBAR, Kardex, Intake/Output, MAR and Recent Results    Shift worked:  7p-7a     Shift summary and any significant changes:     Pt did not sleep very much last night. Had percocet for pain in AM.      Concerns for physician to address:  Hematology called and said that 1 bottle drawn on 3/30 grew gram + cocci in clusters. Zone phone for oncoming shift:   [phone]       Activity:  Activity Level: Up ad fouzia  Number times ambulated in hallways past shift: 5+  Number of times OOB to chair past shift: 3+    Cardiac:   Cardiac Monitoring: Yes      Cardiac Rhythm: Normal sinus rhythm, Sinus tachycardia    Access:   Current line(s): PIV     Genitourinary:   Urinary status: anuric    Respiratory:   O2 Device: Room air  Chronic home O2 use?: N/A  Incentive spirometer at bedside: N/A     GI:  Last Bowel Movement Date: 03/30/21  Current diet:  DIET RENAL Regular  Passing flatus: YES  Tolerating current diet: YES       Pain Management:   Patient states pain is manageable on current regimen: YES    Skin:  Yong Score: 22  Interventions: increase time out of bed    Patient Safety:  Fall Score:  Total Score: 1  Interventions: gripper socks       Length of Stay:  Expected LOS: 4d 19h  Actual LOS: 3      Tiny Hidalgo RN

## 2021-04-01 NOTE — PROGRESS NOTES
EP/ ARRHYTHMIA Follow-up Note    Patient ID:  Patient: Ni Orona  MRN: [id number]  Age: 39 y.o.  : 1975    Date of  Admission: 3/29/2021  4:00 AM   PCP:  Rosmery Patton MD   Usual cardiologist:  Suzette Tavarez MD    Assessment: 1. 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 EVER negative for overt endocarditis. BCX positive from 3/27, 3/29, 3/30. Negative 3/30, . Now on vancomycin (methicillin resistant). 2. Remote SJM single chamber ICD implant (dual coil shock lead) in  with gen change in 2016 due to battery depletion, subsequent diagnosis of lead fracture, system turned OFF, subcutaneous [employer] ICD implanted at [facility]. No evidence of S-ICD pocket infection. 3. Ischemic cardiomyopathy, primary prevention indication for ICD implant. 4. CAD with anterior MI and remote stenting in . Chronic aspirin therapy. 5. Chronic systolic CHF. 6. Remote history of endocarditis. 7. Remote history of IVC filter due to lower extremity DVT. Chronic anticoagulation with warfarin. 8. ESRD with dialysis. Remote renal transplant x 2 due to renal failure with nephrotic syndrome. Failed transplants, chronic hemodialysis with history of access issues, using LUE AV graft currently. 9. History of heparin associated thrombocytopenia. 10. Anemia due to chronic renal disease. Normal platelets. 11. Full code. Plan:     1. Endovascular SJM ICD extraction is reasonable but not without risk, but this would have to be done at another hospital regardless of what is done here. Would be great if the subcutaneous ICD could stay in place, but that could come out too and this would be easy to do. 2. He is not pacemaker-dependent nor has he received therapy for VT-VF, primary prevention device implant. I discussed the issues with him on 3/31, he's on board with whatever is recommended.   Due to CT surgery backup issues per the VCS lead extracters, would be about 2-3 weeks out from an extraction (at [facility] with one of our VCS). He's gotten care at [facility], so will contact them if the team agrees regarding appropriateness for extraction after his surgery tomorrow and any findings. Discussed with Dr. Kaela Alexander, Dr. Valiente Patient, Dr. Bijan Pringle. Dr. Bijan Pringle plans on exploring and likely removing his AV graft on 4/2. .      [x]       High complexity decision making was performed in this patient at high risk for decompensation with multiple organ involvement. Sarah Cardenas is a 39 y.o. male with a history of the above. I was asked by Dr. Valiente Patient to see him in regards to risk and benefits of system extraction. He has a LUE dialysis AV graft which is being utilized. Seen by vascular surgery, no obvious signs of infection there but some small amount of fluid on imaging at the graft and recurrent bacteremia suggests it is a possible problem. No chest pain, syncope, dizziness, or palpitations. No worsening orthopnea, PND, or edema. No TIA or stroke symptoms.       Allergies   Allergen Reactions    Shellfish Containing Products Swelling     Break out in rash, trouble breathing    Contrast Agent [Iodine] Shortness of Breath    Heparin Analogues Other (comments)     Positive history of HIT          Current Facility-Administered Medications   Medication Dose Route Frequency    [Held by provider] warfarin (COUMADIN) tablet 4 mg  4 mg Oral ONCE    [START ON 4/2/2021] Vancomycin - random level with am labs on 4/2  1 Each Other ONCE    melatonin tablet 3 mg  3 mg Oral QHS PRN    WARFARIN INFORMATION NOTE (COUMADIN)   Other QPM    Vancomycin - Pharmacy to dose   Other Rx Dosing/Monitoring    atorvastatin (LIPITOR) tablet 20 mg  20 mg Oral DAILY    aspirin chewable tablet 81 mg  81 mg Oral DAILY    metoprolol succinate (TOPROL-XL) XL tablet 12.5 mg  12.5 mg Oral DAILY    pantoprazole (PROTONIX) tablet 40 mg  40 mg Oral ACB    calcitRIOL (ROCALTROL) capsule 0.5 mcg  0.5 mcg Oral DAILY  sodium chloride (NS) flush 5-40 mL  5-40 mL IntraVENous Q8H    sodium chloride (NS) flush 5-40 mL  5-40 mL IntraVENous PRN    acetaminophen (TYLENOL) tablet 650 mg  650 mg Oral Q6H PRN    Or    acetaminophen (TYLENOL) suppository 650 mg  650 mg Rectal Q6H PRN    polyethylene glycol (MIRALAX) packet 17 g  17 g Oral DAILY PRN    ondansetron (ZOFRAN ODT) tablet 4 mg  4 mg Oral Q8H PRN    Or    ondansetron (ZOFRAN) injection 4 mg  4 mg IntraVENous Q6H PRN    calcium acetate(phosphat bind) (PHOSLO) capsule 1,334 mg  2 Cap Oral TID WITH MEALS    epoetin emilie-epbx (RETACRIT) 12,000 Units combo injection  12,000 Units SubCUTAneous Q MON, WED & FRI    oxyCODONE-acetaminophen (PERCOCET) 5-325 mg per tablet 1 Tab  1 Tab Oral Q6H PRN       Review of Symptoms:    General: +fever, chills, sweats that have resolved, negative for weakness, weight loss   ERespiratory: negative for cough, sputum production, wheezing, hemoptysis, or pleuritic pain   Cardiology: negative for chest pain, palpitations, orthopnea, PND, edema, syncope      Objective:      Physical Exam:  Temp (24hrs), Av.4 °F (36.9 °C), Min:98 °F (36.7 °C), Max:98.7 °F (37.1 °C)    Patient Vitals for the past 8 hrs:   Pulse   21 1541 100   21 1236 100    Patient Vitals for the past 8 hrs:   Resp   21 1541 20   21 1236 20    Patient Vitals for the past 8 hrs:   BP   21 1541 127/83   21 1236 115/66          Intake/Output Summary (Last 24 hours) at 2021 1825  Last data filed at 3/31/2021 2020  Gross per 24 hour   Intake 250 ml   Output 0 ml   Net 250 ml       Nondiaphoretic, not in acute distress. Looks older than stated age. No scleral icterus, mucous membranes moist, no xanthelasma. L chest ICD site without evidence of pocket infection. L axillary ICD site without evidence of pocket infection. Unlabored, clear to auscultation bilaterally anteriorly, symmetric air movement.   Regular rate and rhythm, no new murmur. Vonzella Goodpasture No JVD but mild lower extremity peripheral edema. Palpable radial pulses bilaterally. Abdomen, soft, nontender, nondistended. Extremities without cyanosis or clubbing. Muscle tone and bulk normal for age. Skin warm and dry. No rashes or ulcers. Neuro grossly nonfocal.  No tremor. Awake and appropriate. CARDIOGRAPHICS and STUDIES, I reviewed:    Telemetry:  SR.    ECG:  Reviewed. Labs:  No results for input(s): CPK, CKMB, CKNDX, TROIQ in the last 72 hours. No lab exists for component: CPKMB  Lab Results   Component Value Date/Time    Cholesterol, total 139 05/22/2012 05:00 PM    HDL Cholesterol 32 05/22/2012 05:00 PM    LDL, calculated 75.8 05/22/2012 05:00 PM    Triglyceride 156 (H) 05/22/2012 05:00 PM    CHOL/HDL Ratio 4.3 05/22/2012 05:00 PM     Recent Labs     04/01/21  0150 03/31/21  0139 03/30/21  0345   INR 1.6* 1.4* 1.4*   PTP 15.9* 14.1* 14.5*      Recent Labs     04/01/21  1557 04/01/21  0150 03/31/21  0139 03/30/21  0454   NA  --  130* 131* 133*   K  --  4.3 4.3 4.2   CL  --  93* 94* 96*   CO2  --  28 24 28   BUN  --  21* 41* 33*   CREA  --  5.37* 8.22* 6.49*   GLU  --  106* 101* 87   PHOS  --  3.5  --   --    CA  --  9.0 9.1 8.5   WBC  --   --   --  12.9*   HGB 7.7*  --   --  6.8*   HCT 24.0*  --   --  20.1*   PLT  --   --   --  258     No results for input(s): AP, TBIL, TP, ALB, GLOB, GGT, AML, LPSE in the last 72 hours. No lab exists for component: SGOT, GPT, AMYP, HLPSE  No components found for: GLPOC  No results for input(s): PH, PCO2, PO2 in the last 72 hours.         Sarwat Swain MD  4/1/2021

## 2021-04-01 NOTE — PROGRESS NOTES
.      Hospitalist Progress Note    NAME: Radha Santiago   :  1975   MRN:  [id number]       Assessment / Plan:  Gram-positive bacteremia (methicillin-resistant staph epidermidis) in 4 bottles 3/27  Had bacteremia in 2020, had femoral dialysis catheter for 2 months at the time (was tunneled catheter)  History of endocarditis  Transthoracic echocardiography with mobile structure in the left ventricle, ejection fraction is 30%mitral regurg  Coronary artery disease   ischemic cardiomyopathy with AICD  CT scan of the lumbar spine is not showing signs of osteomyelitis  Vascular surgery consulted mild fluid around AV graft. Planning exploration tomorrow . Cardiology were consulted and they are looking into the old ICD and leads. EVER 3/30 no signs of endocarditis  3/30 cultures still with 1/2 growing GPC. Culture repeat ordered  discussed case with infection disease Dr. 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 Code status: Full  Prophylaxis: Coumadin  Recommended Disposition: Home w/Family     Subjective:     Chief Complaint / Reason for Physician Visit  \"feeling ok, having insomnia\". To follow-up with Mr. Adria Hamlin with gram-positive bacteremia discussed with RN events overnight.      Review of Systems:  Symptom Y/N Comments  Symptom Y/N Comments   Fever/Chills n   Chest Pain n    Poor Appetite n   Edema     Cough n   Abdominal Pain n    Sputum n   Joint Pain     SOB/ZAMUDIO n   Pruritis/Rash     Nausea/vomit n   Tolerating PT/OT     Diarrhea    Tolerating Diet     Constipation    Other       Could NOT obtain due to:      Objective:     VITALS:   Last 24hrs VS reviewed since prior progress note. Most recent are:  Patient Vitals for the past 24 hrs:   Temp Pulse Resp BP SpO2   04/01/21 1236 98.7 °F (37.1 °C) 100 20 115/66 100 %   04/01/21 0800     100 %   04/01/21 0732 98.5 °F (36.9 °C) 94 20 (!) 113/59 100 %   04/01/21 0349 98.5 °F (36.9 °C) (!) 104 20 106/60 100 %   04/01/21 0020 98.2 °F (36.8 °C) (!) 102 20 110/67 99 %   03/31/21 2022 98.5 °F (36.9 °C) (!) 102 20 (!) 104/59 99 %   03/31/21 1814 100 °F (37.8 °C) (!) 104 17 101/61 97 %       Intake/Output Summary (Last 24 hours) at 4/1/2021 1440  Last data filed at 3/31/2021 2020  Gross per 24 hour   Intake 250 ml   Output 0 ml   Net 250 ml        I had a face to face encounter and independently examined this patient on 4/1/2021, as outlined below:  PHYSICAL EXAM:  General: WD, WN. Alert, cooperative, no acute distress    EENT:  EOMI. Anicteric sclerae. MMM  Resp:  CTA bilaterally, no wheezing or rales. No accessory muscle use  CV:  Regular  rhythm,  No edema  GI:  Soft, Non distended, Non tender. +Bowel sounds  Neurologic:  Alert and oriented X 3, normal speech,   Psych:   Good insight. Not anxious nor agitated  Skin:  No rashes. No jaundice    Reviewed most current lab test results and cultures  YES  Reviewed most current radiology test results   YES  Review and summation of old records today    NO  Reviewed patient's current orders and MAR    YES  PMH/SH reviewed - no change compared to H&P  ________________________________________________________________________  Care Plan discussed with:    Comments   Patient x    Family      RN x    Care Manager x    Consultant  X ID                    x Multidiciplinary team rounds were held today with , nursing, pharmacist and clinical coordinator. Patient's plan of care was discussed; medications were reviewed and discharge planning was addressed. ________________________________________________________________________  Total NON critical care TIME:  23   Minutes    Total CRITICAL CARE TIME Spent:   Minutes non procedure based      Comments   >50% of visit spent in counseling and coordination of care x    ________________________________________________________________________  Christiano Rodriguez MD     Procedures: see electronic medical records for all procedures/Xrays and details which were not copied into this note but were reviewed prior to creation of Plan. LABS:  I reviewed today's most current labs and imaging studies.   Pertinent labs include:  Recent Labs     03/30/21  0454   WBC 12.9*   HGB 6.8*   HCT 20.1*        Recent Labs     04/01/21  0150 03/31/21  0139 03/30/21  0454 03/30/21  0345   * 131* 133*  --    K 4.3 4.3 4.2  --    CL 93* 94* 96*  --    CO2 28 24 28  --    * 101* 87  --    BUN 21* 41* 33*  --    CREA 5.37* 8.22* 6.49*  --    CA 9.0 9.1 8.5  --    MG 2.2  --  2.2  --    PHOS 3.5  --   --   --    INR 1.6* 1.4*  --  1.4*       Signed: Christiano Rodriguez MD

## 2021-04-01 NOTE — PROGRESS NOTES
Anesthesiology    Anemia and elevated INR noted. OK to proceed with scheduled surgical procedure, assuming no acute changes in overnight clinical status or lab derangements and patient remains NPO.

## 2021-04-01 NOTE — PROGRESS NOTES
Vascular Surgery Progress Note  Sebastian Caceres ACNP-BC  4/1/2021       Subjective:     Mark Barbosa is a 39 y.o.  with a hx of ASHD, AICD x2, HTN, HLD, ESRF, Recurrent DVT s/p IVC, GERD, and SBO. He is admitted to the hospital with sepsis secondary to bacteremia. He has a hx of endocarditis and was diagnosed with MRSE bacteremia 11/2020 due to a femoral dialysis catheter. We have been asked to evaluate for AVG infection. He is s/p creation of a RUE bovine AVG (now w/o flow) & LUE [place]-Ryan AVG 9/2020. He continues to be mildly tachycardic. His BP is controlled. His RR is normal.  Repeat CBC is not available. He is ambulating in the room and does not appear toxic. Nursing Data:     Patient Vitals for the past 24 hrs:   BP Temp Pulse Resp SpO2 Weight   04/01/21 0800     100 %    04/01/21 0732 (!) 113/59 98.5 °F (36.9 °C) 94 20 100 %    04/01/21 0625      65.4 kg (144 lb 2.9 oz)   04/01/21 0349 106/60 98.5 °F (36.9 °C) (!) 104 20 100 %    04/01/21 0020 110/67 98.2 °F (36.8 °C) (!) 102 20 99 %    03/31/21 2022 (!) 104/59 98.5 °F (36.9 °C) (!) 102 20 99 %    03/31/21 1814 101/61 100 °F (37.8 °C) (!) 104 17 97 %        Intake/Output Summary (Last 24 hours) at 4/1/2021 1303  Last data filed at 3/31/2021 2020  Gross per 24 hour   Intake 250 ml   Output 0 ml   Net 250 ml       Exam:     Constititional:     Alert and oriented. He is ambulating independently   HENT:      Head: Normocephalic. Nose: Nose normal.      Mouth/Throat:      Mouth: Mucous membranes are moist.      Moon face   Eyes:      Pupils: Pupils are equal, round, and reactive to light. Neck:      Musculoskeletal: Normal range of motion. Cardiovascular:      Rate and Rhythm: Mildly tachycardic w/ regular rhythm. Comments: LUE AVG with palpable thrill and no erythema or edema. Left foot warm and perfused. Chronic 2+ edema.      Pulmonary:      Effort: Pulmonary effort is normal. No respiratory distress. Abdominal:      General: Abdomen is flat. There is no distension. Musculoskeletal: Normal range of motion. Skin:     General: Skin is warm. Neurological:      General: No focal deficit present. Mental Status: He is alert. Mental status is at baseline. Psychiatric:         Mood and Affect: Mood normal.         Behavior: Behavior normal.     Lab Review:     .   Recent Results (from the past 24 hour(s))   DUPLEX LOWER EXT BYPASS GRAFT LEFT    Collection Time: 03/31/21  2:59 PM   Result Value Ref Range    Left Inflow Artery PSV 67.7 cm/s    Left Inflow Artery EDV 24.6 cm/s    Left Prox Anastomosis PSV 54.6 cm/s    Left Prox Anastomosis EDV 16.8 cm/s    Left Prox Outflow PSV 42.9 cm/s    Left Prox Outflow EDV 14.2 cm/s    Left Mid Outflow PSV 55.2 cm/s    Left Mid Outflow EDV 11.3 cm/s    Left Dist Outflow PSV 87.9 cm/s    Left Dist Outflow EDV 16.9 cm/s    Left Dist Anastomosis PSV 65.4 cm/s    Left Dist Anastomosis EDV 26.3 cm/s    Left Outflow Vessel .6 cm/s    Left Outflow Vessel EDV 22.5 cm/s    Left Graft 1 Left above knee popliteal to JOEY BPG    ANKLE BRACHIAL INDEX    Collection Time: 03/31/21  2:59 PM   Result Value Ref Range    Left toe pressure 0 mmHg    Left TBI 0.00     Right arm  mmHg    Right toe pressure 55 mmHg    Right TBI 0.51     Right JOEY level DPA     Left JOEY level DPA    PROTHROMBIN TIME + INR    Collection Time: 04/01/21  1:50 AM   Result Value Ref Range    INR 1.6 (H) 0.9 - 1.1      Prothrombin time 15.9 (H) 9.0 - 26.3 sec   METABOLIC PANEL, BASIC    Collection Time: 04/01/21  1:50 AM   Result Value Ref Range    Sodium 130 (L) 136 - 145 mmol/L    Potassium 4.3 3.5 - 5.1 mmol/L    Chloride 93 (L) 97 - 108 mmol/L    CO2 28 21 - 32 mmol/L    Anion gap 9 5 - 15 mmol/L    Glucose 106 (H) 65 - 100 mg/dL    BUN 21 (H) 6 - 20 MG/DL    Creatinine 5.37 (H) 0.70 - 1.30 MG/DL    BUN/Creatinine ratio 4 (L) 12 - 20      GFR est AA 14 (L) >60 ml/min/1.73m2    GFR est non-AA 12 (L) >60 ml/min/1.73m2    Calcium 9.0 8.5 - 10.1 MG/DL   MAGNESIUM    Collection Time: 04/01/21  1:50 AM   Result Value Ref Range    Magnesium 2.2 1.6 - 2.4 mg/dL   PHOSPHORUS    Collection Time: 04/01/21  1:50 AM   Result Value Ref Range    Phosphorus 3.5 2.6 - 4.7 MG/DL          Assessment/Plan:      Sepsis  Persistent MSSA Bacteremia   -blood cx 3/27 STAPHYLOCOCCUS EPIDERMIDIS 4/4  -repeat blood cx 3/29 STAPHYLOCOCCUS EPIDERMIDIS (OXACILLIN RESISTANT) 4/4  -repeat blood cx 3/30 [address] 1/2   Hx of Endocarditis    -\"EVER neg for overt endocarditis\"  -Hx of MRSE bacteremia 11/2020 (right femoral catheter removed)  -Hx of AICD x2 (initial system turned off due to lead fx & subcutaneous [employer] ICD implanted at [facility]). -Hx of LUE Garretson-Ryan AVG  -Hx of RUE bovine AVG  -Hx of IVC filter  - US w/ thin layer of along LUE graft   -Cardiology consulted  · Persistently positive blood cultures. We will plan for exploration and possible explantation of left arm AVG in the am.  NPO after midnight. Hold warfarin. Obtain consent. · Antibx per infectious disease.      PAD  -ABIs NC bilaterally. Right TBI 0.51 and left TBI 0.0 (Left ankle PVR waveforms appear within normal limits & absent left great toe PPG signal). Left foot is warm and perfused w/o CLI. LLE edema. -left femoral to popliteal vein bypass. Duplex reveals a patent bypass  -ASA and statin      ESRF  -s/p renal cell transplant x2 (1992 & 2001)  -home HD 5 days per week [facility]  Hypoantremia   Hyperkalemia   -resolved  Anemia of chronic renal disease   -Retacrit  -labile   · Plan per nephrology     HTN  -labile   HLD  ASHD  -ASA, BBlocker, & statin   -remote hx of stenting   Ischemic Cardiomyopathy  Chronic systolic CHF   -EF 49-28%    AICD  -#2 ICD present     Seizure disorder     GERD  -PPI     VTE Prophylaxis:  Recurrent DVT  -s/p IVC  -warfarin   Hx of H. I.T  Warfarin therapeutic   SCDs contraindicated in PAD  Encourage OOB       Disposition:  Likely home

## 2021-04-01 NOTE — PROGRESS NOTES
Pharmacy Daily Dosing of Warfarin    Indication: AFib    Goal INR: 2-3    PTA Warfarin Dose: warfarin 2.5 mg daily    Concurrent anticoagulants: NA    Concurrent antiplatelet: aspirin    Major Interacting Medications   Drugs that may increase INR: NA  Drugs that may decrease INR: NA    Conditions that may increase/decrease INR (CHF, C. diff, cirrhosis, thyroid disorder, hypoalbuminemia): NA    Labs:  Recent Labs     04/01/21  0150 03/31/21  0139 03/30/21  0454 03/30/21  0345   INR 1.6* 1.4*  --  1.4*   HGB  --   --  6.8*  --    PLT  --   --  258  --      Impression/Plan:   Will order warfarin 4 mg PO x 1 dose. Daily INR  CBC w/o diff QOD     Pharmacy will follow daily and adjust the dose as appropriate. Thanks  Keith Worthington PHARMD      [email or web address]. pdf

## 2021-04-02 ENCOUNTER — ANESTHESIA (OUTPATIENT)
Dept: SURGERY | Age: 46
DRG: 252 | End: 2021-04-02
Payer: MEDICARE

## 2021-04-02 ENCOUNTER — APPOINTMENT (OUTPATIENT)
Dept: GENERAL RADIOLOGY | Age: 46
DRG: 252 | End: 2021-04-02
Attending: SURGERY
Payer: MEDICARE

## 2021-04-02 LAB
ALBUMIN SERPL-MCNC: 3.1 G/DL (ref 3.5–5)
ANION GAP SERPL CALC-SCNC: 10 MMOL/L (ref 5–15)
ANION GAP SERPL CALC-SCNC: 11 MMOL/L (ref 5–15)
BACTERIA SPEC CULT: ABNORMAL
BUN SERPL-MCNC: 29 MG/DL (ref 6–20)
BUN SERPL-MCNC: 32 MG/DL (ref 6–20)
BUN/CREAT SERPL: 4 (ref 12–20)
BUN/CREAT SERPL: 4 (ref 12–20)
CALCIUM SERPL-MCNC: 9 MG/DL (ref 8.5–10.1)
CALCIUM SERPL-MCNC: 9.2 MG/DL (ref 8.5–10.1)
CHLORIDE SERPL-SCNC: 92 MMOL/L (ref 97–108)
CHLORIDE SERPL-SCNC: 93 MMOL/L (ref 97–108)
CO2 SERPL-SCNC: 26 MMOL/L (ref 21–32)
CO2 SERPL-SCNC: 28 MMOL/L (ref 21–32)
CREAT SERPL-MCNC: 6.61 MG/DL (ref 0.7–1.3)
CREAT SERPL-MCNC: 7.61 MG/DL (ref 0.7–1.3)
ERYTHROCYTE [DISTWIDTH] IN BLOOD BY AUTOMATED COUNT: 16.7 % (ref 11.5–14.5)
GLUCOSE SERPL-MCNC: 87 MG/DL (ref 65–100)
GLUCOSE SERPL-MCNC: 92 MG/DL (ref 65–100)
HCT VFR BLD AUTO: 18.8 % (ref 36.6–50.3)
HCT VFR BLD AUTO: 19.6 % (ref 36.6–50.3)
HGB BLD-MCNC: 6.1 G/DL (ref 12.1–17)
HGB BLD-MCNC: 6.4 G/DL (ref 12.1–17)
HISTORY CHECKED?,CKHIST: NORMAL
HISTORY CHECKED?,CKHIST: NORMAL
INR PPP: 1.5 (ref 0.9–1.1)
LEFT ARTERIAL PROX ANASTOMOSIS EDV: 16.8 CM/S
LEFT ARTERIAL PROX ANASTOMOSIS PSV: 54.6 CM/S
LEFT ATA BP LEVEL: NORMAL
LEFT DIST OUTFLOW EDV: 16.9 CM/S
LEFT DIST OUTFLOW PSV: 87.9 CM/S
LEFT GRAFT 1 NAME: NORMAL
LEFT INFLOW ARTERY EDV: 24.6 CM/S
LEFT INFLOW ARTERY PSV: 67.7 CM/S
LEFT MID OUTFLOW EDV: 11.3 CM/S
LEFT MID OUTFLOW PSV: 55.2 CM/S
LEFT OUTFLOW VESSEL EDV: 22.5 CM/S
LEFT OUTFLOW VESSEL PSV: 136.6 CM/S
LEFT PROX OUTFLOW EDV: 14.2 CM/S
LEFT PROX OUTFLOW PSV: 42.9 CM/S
LEFT TBI: 0
LEFT TOE PRESSURE: 0 MMHG
LEFT VENOUS DIST ANASTOMOSIS EDV: 26.3 CM/S
LEFT VENOUS DIST ANASTOMOSIS PSV: 65.4 CM/S
MCH RBC QN AUTO: 32 PG (ref 26–34)
MCHC RBC AUTO-ENTMCNC: 32.7 G/DL (ref 30–36.5)
MCV RBC AUTO: 98 FL (ref 80–99)
NRBC # BLD: 0 K/UL (ref 0–0.01)
NRBC BLD-RTO: 0 PER 100 WBC
PHOSPHATE SERPL-MCNC: 3.4 MG/DL (ref 2.6–4.7)
PLATELET # BLD AUTO: 265 K/UL (ref 150–400)
PMV BLD AUTO: 10.3 FL (ref 8.9–12.9)
POTASSIUM SERPL-SCNC: 4.6 MMOL/L (ref 3.5–5.1)
POTASSIUM SERPL-SCNC: 5.1 MMOL/L (ref 3.5–5.1)
PROTHROMBIN TIME: 15 SEC (ref 9–11.1)
RBC # BLD AUTO: 2 M/UL (ref 4.1–5.7)
RIGHT ARM BP: 107 MMHG
RIGHT ATA BP LEVEL: NORMAL
RIGHT TBI: 0.51
RIGHT TOE PRESSURE: 55 MMHG
SERVICE CMNT-IMP: ABNORMAL
SODIUM SERPL-SCNC: 129 MMOL/L (ref 136–145)
SODIUM SERPL-SCNC: 131 MMOL/L (ref 136–145)
VANCOMYCIN SERPL-MCNC: 19.1 UG/ML
WBC # BLD AUTO: 15.4 K/UL (ref 4.1–11.1)

## 2021-04-02 PROCEDURE — 85027 COMPLETE CBC AUTOMATED: CPT

## 2021-04-02 PROCEDURE — 74011000258 HC RX REV CODE- 258: Performed by: EMERGENCY MEDICINE

## 2021-04-02 PROCEDURE — 74011250636 HC RX REV CODE- 250/636: Performed by: NURSE ANESTHETIST, CERTIFIED REGISTERED

## 2021-04-02 PROCEDURE — 30233N1 TRANSFUSION OF NONAUTOLOGOUS RED BLOOD CELLS INTO PERIPHERAL VEIN, PERCUTANEOUS APPROACH: ICD-10-PCS | Performed by: INTERNAL MEDICINE

## 2021-04-02 PROCEDURE — 73060 X-RAY EXAM OF HUMERUS: CPT

## 2021-04-02 PROCEDURE — 77030002916 HC SUT ETHLN J&J -A: Performed by: SURGERY

## 2021-04-02 PROCEDURE — 90935 HEMODIALYSIS ONE EVALUATION: CPT

## 2021-04-02 PROCEDURE — 74011250637 HC RX REV CODE- 250/637: Performed by: SURGERY

## 2021-04-02 PROCEDURE — 74011250636 HC RX REV CODE- 250/636: Performed by: EMERGENCY MEDICINE

## 2021-04-02 PROCEDURE — 76000 FLUOROSCOPY <1 HR PHYS/QHP: CPT

## 2021-04-02 PROCEDURE — 76010000133 HC OR TIME 3 TO 3.5 HR: Performed by: SURGERY

## 2021-04-02 PROCEDURE — 74011000258 HC RX REV CODE- 258: Performed by: NURSE ANESTHETIST, CERTIFIED REGISTERED

## 2021-04-02 PROCEDURE — 74011250636 HC RX REV CODE- 250/636: Performed by: SURGERY

## 2021-04-02 PROCEDURE — 77030002996 HC SUT SLK J&J -A: Performed by: SURGERY

## 2021-04-02 PROCEDURE — 77030038692 HC WND DEB SYS IRMX -B: Performed by: SURGERY

## 2021-04-02 PROCEDURE — 86923 COMPATIBILITY TEST ELECTRIC: CPT

## 2021-04-02 PROCEDURE — 03C80ZZ EXTIRPATION OF MATTER FROM LEFT BRACHIAL ARTERY, OPEN APPROACH: ICD-10-PCS | Performed by: SURGERY

## 2021-04-02 PROCEDURE — 74011250637 HC RX REV CODE- 250/637: Performed by: STUDENT IN AN ORGANIZED HEALTH CARE EDUCATION/TRAINING PROGRAM

## 2021-04-02 PROCEDURE — 86901 BLOOD TYPING SEROLOGIC RH(D): CPT

## 2021-04-02 PROCEDURE — 77030008684 HC TU ET CUF COVD -B: Performed by: NURSE ANESTHETIST, CERTIFIED REGISTERED

## 2021-04-02 PROCEDURE — C1725 CATH, TRANSLUMIN NON-LASER: HCPCS | Performed by: SURGERY

## 2021-04-02 PROCEDURE — 74011000250 HC RX REV CODE- 250: Performed by: NURSE ANESTHETIST, CERTIFIED REGISTERED

## 2021-04-02 PROCEDURE — 80048 BASIC METABOLIC PNL TOTAL CA: CPT

## 2021-04-02 PROCEDURE — C1757 CATH, THROMBECTOMY/EMBOLECT: HCPCS | Performed by: SURGERY

## 2021-04-02 PROCEDURE — 99233 SBSQ HOSP IP/OBS HIGH 50: CPT | Performed by: STUDENT IN AN ORGANIZED HEALTH CARE EDUCATION/TRAINING PROGRAM

## 2021-04-02 PROCEDURE — 85018 HEMOGLOBIN: CPT

## 2021-04-02 PROCEDURE — 05783ZZ DILATION OF LEFT AXILLARY VEIN, PERCUTANEOUS APPROACH: ICD-10-PCS | Performed by: SURGERY

## 2021-04-02 PROCEDURE — 05C80ZZ EXTIRPATION OF MATTER FROM LEFT AXILLARY VEIN, OPEN APPROACH: ICD-10-PCS | Performed by: SURGERY

## 2021-04-02 PROCEDURE — 80069 RENAL FUNCTION PANEL: CPT

## 2021-04-02 PROCEDURE — 2709999900 HC NON-CHARGEABLE SUPPLY: Performed by: SURGERY

## 2021-04-02 PROCEDURE — 80202 ASSAY OF VANCOMYCIN: CPT

## 2021-04-02 PROCEDURE — C1894 INTRO/SHEATH, NON-LASER: HCPCS | Performed by: SURGERY

## 2021-04-02 PROCEDURE — 77030002987 HC SUT PROL J&J -B: Performed by: SURGERY

## 2021-04-02 PROCEDURE — 76210000006 HC OR PH I REC 0.5 TO 1 HR: Performed by: SURGERY

## 2021-04-02 PROCEDURE — 87205 SMEAR GRAM STAIN: CPT

## 2021-04-02 PROCEDURE — 87075 CULTR BACTERIA EXCEPT BLOOD: CPT

## 2021-04-02 PROCEDURE — C1769 GUIDE WIRE: HCPCS | Performed by: SURGERY

## 2021-04-02 PROCEDURE — 77030026438 HC STYL ET INTUB CARD -A: Performed by: NURSE ANESTHETIST, CERTIFIED REGISTERED

## 2021-04-02 PROCEDURE — P9016 RBC LEUKOCYTES REDUCED: HCPCS

## 2021-04-02 PROCEDURE — 86644 CMV ANTIBODY: CPT

## 2021-04-02 PROCEDURE — 36415 COLL VENOUS BLD VENIPUNCTURE: CPT

## 2021-04-02 PROCEDURE — 77030031139 HC SUT VCRL2 J&J -A: Performed by: SURGERY

## 2021-04-02 PROCEDURE — 85610 PROTHROMBIN TIME: CPT

## 2021-04-02 PROCEDURE — P9045 ALBUMIN (HUMAN), 5%, 250 ML: HCPCS | Performed by: NURSE ANESTHETIST, CERTIFIED REGISTERED

## 2021-04-02 PROCEDURE — 77030013058 HC DEV INFL ANGIO BSC -B: Performed by: SURGERY

## 2021-04-02 PROCEDURE — 65270000029 HC RM PRIVATE

## 2021-04-02 PROCEDURE — 77030003703 HC NDL VASC ACC COOK -A: Performed by: SURGERY

## 2021-04-02 PROCEDURE — 74011250636 HC RX REV CODE- 250/636: Performed by: INTERNAL MEDICINE

## 2021-04-02 PROCEDURE — 74011250637 HC RX REV CODE- 250/637: Performed by: INTERNAL MEDICINE

## 2021-04-02 PROCEDURE — P9040 RBC LEUKOREDUCED IRRADIATED: HCPCS

## 2021-04-02 PROCEDURE — 76060000037 HC ANESTHESIA 3 TO 3.5 HR: Performed by: SURGERY

## 2021-04-02 PROCEDURE — 74011250636 HC RX REV CODE- 250/636

## 2021-04-02 PROCEDURE — 36430 TRANSFUSION BLD/BLD COMPNT: CPT

## 2021-04-02 PROCEDURE — 77030002986 HC SUT PROL J&J -A: Performed by: SURGERY

## 2021-04-02 RX ORDER — DIPHENHYDRAMINE HYDROCHLORIDE 50 MG/ML
INJECTION, SOLUTION INTRAMUSCULAR; INTRAVENOUS AS NEEDED
Status: DISCONTINUED | OUTPATIENT
Start: 2021-04-02 | End: 2021-04-02 | Stop reason: HOSPADM

## 2021-04-02 RX ORDER — ALBUMIN HUMAN 50 G/1000ML
SOLUTION INTRAVENOUS AS NEEDED
Status: DISCONTINUED | OUTPATIENT
Start: 2021-04-02 | End: 2021-04-02 | Stop reason: HOSPADM

## 2021-04-02 RX ORDER — GLYCOPYRROLATE 0.2 MG/ML
INJECTION INTRAMUSCULAR; INTRAVENOUS AS NEEDED
Status: DISCONTINUED | OUTPATIENT
Start: 2021-04-02 | End: 2021-04-02 | Stop reason: HOSPADM

## 2021-04-02 RX ORDER — LIDOCAINE HYDROCHLORIDE 20 MG/ML
INJECTION, SOLUTION EPIDURAL; INFILTRATION; INTRACAUDAL; PERINEURAL AS NEEDED
Status: DISCONTINUED | OUTPATIENT
Start: 2021-04-02 | End: 2021-04-02 | Stop reason: HOSPADM

## 2021-04-02 RX ORDER — SODIUM CHLORIDE 9 MG/ML
250 INJECTION, SOLUTION INTRAVENOUS AS NEEDED
Status: DISCONTINUED | OUTPATIENT
Start: 2021-04-02 | End: 2021-04-05 | Stop reason: SDUPTHER

## 2021-04-02 RX ORDER — MORPHINE SULFATE 2 MG/ML
2 INJECTION, SOLUTION INTRAMUSCULAR; INTRAVENOUS
Status: DISCONTINUED | OUTPATIENT
Start: 2021-04-02 | End: 2021-04-05

## 2021-04-02 RX ORDER — PROPOFOL 10 MG/ML
INJECTION, EMULSION INTRAVENOUS AS NEEDED
Status: DISCONTINUED | OUTPATIENT
Start: 2021-04-02 | End: 2021-04-02 | Stop reason: HOSPADM

## 2021-04-02 RX ORDER — ROCURONIUM BROMIDE 10 MG/ML
INJECTION, SOLUTION INTRAVENOUS AS NEEDED
Status: DISCONTINUED | OUTPATIENT
Start: 2021-04-02 | End: 2021-04-02 | Stop reason: HOSPADM

## 2021-04-02 RX ORDER — FENTANYL CITRATE 50 UG/ML
INJECTION, SOLUTION INTRAMUSCULAR; INTRAVENOUS AS NEEDED
Status: DISCONTINUED | OUTPATIENT
Start: 2021-04-02 | End: 2021-04-02 | Stop reason: HOSPADM

## 2021-04-02 RX ORDER — NEOSTIGMINE METHYLSULFATE 1 MG/ML
INJECTION, SOLUTION INTRAVENOUS AS NEEDED
Status: DISCONTINUED | OUTPATIENT
Start: 2021-04-02 | End: 2021-04-02 | Stop reason: HOSPADM

## 2021-04-02 RX ORDER — HYDROCORTISONE SODIUM SUCCINATE 100 MG/2ML
INJECTION, POWDER, FOR SOLUTION INTRAMUSCULAR; INTRAVENOUS AS NEEDED
Status: DISCONTINUED | OUTPATIENT
Start: 2021-04-02 | End: 2021-04-02 | Stop reason: HOSPADM

## 2021-04-02 RX ORDER — WARFARIN SODIUM 5 MG/1
5 TABLET ORAL ONCE
Status: ACTIVE | OUTPATIENT
Start: 2021-04-02 | End: 2021-04-03

## 2021-04-02 RX ORDER — FENTANYL CITRATE 50 UG/ML
INJECTION, SOLUTION INTRAMUSCULAR; INTRAVENOUS
Status: COMPLETED
Start: 2021-04-02 | End: 2021-04-02

## 2021-04-02 RX ORDER — SODIUM CHLORIDE 9 MG/ML
INJECTION, SOLUTION INTRAVENOUS
Status: DISCONTINUED | OUTPATIENT
Start: 2021-04-02 | End: 2021-04-02 | Stop reason: HOSPADM

## 2021-04-02 RX ORDER — SODIUM CHLORIDE 9 MG/ML
25 INJECTION, SOLUTION INTRAVENOUS CONTINUOUS
Status: DISCONTINUED | OUTPATIENT
Start: 2021-04-03 | End: 2021-04-02 | Stop reason: HOSPADM

## 2021-04-02 RX ORDER — FENTANYL CITRATE 50 UG/ML
25 INJECTION, SOLUTION INTRAMUSCULAR; INTRAVENOUS
Status: DISCONTINUED | OUTPATIENT
Start: 2021-04-02 | End: 2021-04-02 | Stop reason: HOSPADM

## 2021-04-02 RX ADMIN — METOPROLOL SUCCINATE 12.5 MG: 25 TABLET, EXTENDED RELEASE ORAL at 14:04

## 2021-04-02 RX ADMIN — LIDOCAINE HYDROCHLORIDE 100 MG: 20 INJECTION, SOLUTION INTRAVENOUS at 16:14

## 2021-04-02 RX ADMIN — Medication 10 ML: at 05:50

## 2021-04-02 RX ADMIN — ROCURONIUM BROMIDE 10 MG: 10 INJECTION INTRAVENOUS at 16:15

## 2021-04-02 RX ADMIN — OXYCODONE HYDROCHLORIDE AND ACETAMINOPHEN 1 TABLET: 5; 325 TABLET ORAL at 07:51

## 2021-04-02 RX ADMIN — Medication 10 ML: at 22:07

## 2021-04-02 RX ADMIN — GLYCOPYRROLATE 0.2 MG: 0.2 INJECTION, SOLUTION INTRAMUSCULAR; INTRAVENOUS at 19:12

## 2021-04-02 RX ADMIN — SODIUM CHLORIDE 25 ML/HR: 9 INJECTION, SOLUTION INTRAVENOUS at 14:48

## 2021-04-02 RX ADMIN — FENTANYL CITRATE 25 MCG: 50 INJECTION, SOLUTION INTRAMUSCULAR; INTRAVENOUS at 20:17

## 2021-04-02 RX ADMIN — OXYCODONE HYDROCHLORIDE AND ACETAMINOPHEN 1 TABLET: 5; 325 TABLET ORAL at 01:23

## 2021-04-02 RX ADMIN — Medication 1 MG: at 19:12

## 2021-04-02 RX ADMIN — PROPOFOL 160 MG: 10 INJECTION, EMULSION INTRAVENOUS at 16:14

## 2021-04-02 RX ADMIN — SODIUM CHLORIDE: 900 INJECTION, SOLUTION INTRAVENOUS at 16:08

## 2021-04-02 RX ADMIN — Medication 3 AMPULE: at 15:00

## 2021-04-02 RX ADMIN — FENTANYL CITRATE 100 MCG: 50 INJECTION, SOLUTION INTRAMUSCULAR; INTRAVENOUS at 16:14

## 2021-04-02 RX ADMIN — VANCOMYCIN HYDROCHLORIDE 500 MG: 500 INJECTION, POWDER, LYOPHILIZED, FOR SOLUTION INTRAVENOUS at 15:34

## 2021-04-02 RX ADMIN — EPOETIN ALFA-EPBX 12000 UNITS: 10000 INJECTION, SOLUTION INTRAVENOUS; SUBCUTANEOUS at 22:07

## 2021-04-02 RX ADMIN — ALBUMIN (HUMAN) 250 ML: 2.5 SOLUTION INTRAVENOUS at 19:07

## 2021-04-02 RX ADMIN — DIPHENHYDRAMINE HYDROCHLORIDE 50 MG: 50 INJECTION, SOLUTION INTRAMUSCULAR; INTRAVENOUS at 17:34

## 2021-04-02 RX ADMIN — ROCURONIUM BROMIDE 10 MG: 10 INJECTION INTRAVENOUS at 16:14

## 2021-04-02 RX ADMIN — CALCITRIOL CAPSULES 0.25 MCG 0.5 MCG: 0.25 CAPSULE ORAL at 14:04

## 2021-04-02 RX ADMIN — SODIUM CHLORIDE 80 MCG/MIN: 900 INJECTION, SOLUTION INTRAVENOUS at 16:17

## 2021-04-02 RX ADMIN — ATORVASTATIN CALCIUM 20 MG: 20 TABLET, FILM COATED ORAL at 14:04

## 2021-04-02 RX ADMIN — BIVALIRUDIN 0.25 MG/KG/HR: 250 INJECTION, POWDER, LYOPHILIZED, FOR SOLUTION INTRAVENOUS at 17:40

## 2021-04-02 RX ADMIN — HYDROCORTISONE SODIUM SUCCINATE 100 MG: 100 INJECTION, POWDER, FOR SOLUTION INTRAMUSCULAR; INTRAVENOUS at 17:34

## 2021-04-02 NOTE — PROGRESS NOTES
EP/ ARRHYTHMIA Progress Note    Patient ID:  Patient: Glynn Guevara  MRN: [id number]  Age: 39 y.o.  : 1975    Date of  Admission: 3/29/2021  4:00 AM   PCP:  Richmond Holman MD   Usual cardiologist:  Timoteo Ritchie MD    Assessment: 1. 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 EVER negative for overt endocarditis. BCX positive from 3/27, 3/29, 3/30. Negative 3/30, . Now on vancomycin (methicillin resistant). 2. Remote SJM single chamber ICD implant (dual coil shock lead) in  with gen change in 2016 due to battery depletion, subsequent diagnosis of lead fracture, system turned OFF, subcutaneous [employer] ICD implanted at [facility]. No evidence of S-ICD pocket infection. 3. Ischemic cardiomyopathy, primary prevention indication for ICD implant. 4. CAD with anterior MI and remote stenting in . Chronic aspirin therapy. 5. Chronic systolic CHF. 6. Remote history of endocarditis. 7. Remote history of IVC filter due to lower extremity DVT. Chronic anticoagulation with warfarin. 8. ESRD with dialysis. Remote renal transplant x 2 due to renal failure with nephrotic syndrome. Failed transplants, chronic hemodialysis with history of access issues, using LUE AV graft currently. 9. History of heparin associated thrombocytopenia. 10. Anemia due to chronic renal disease. Hgb 6.1 on . Normal platelets. 11. Iodine contrast reaction (per notes). 12. Full code. Plan:     1. Endovascular SJM ICD extraction is reasonable to consider but not without substantial risk--this would have to be done outside of [facility]. Would be great if the subcutaneous ICD could stay in place, but that could come out too if needed at lower risk. .  2. He is not pacemaker-dependent nor has he received therapy for VT-VF, this is a primary prevention device implant. I discussed the issues with the patient, he's on board with whatever is recommended.   Due to CT surgery backup issues, would be about 2-3 weeks out from a potential endovascular system extraction I am told. It seems that he's gotten his latest cardiology care at [facility], so can contact them regarding appropriateness for extraction but first he needs to undergo his left upper extremity AV graft surgery. The number for [facility] is [phone], choose option 5 for physician to physician communication. Dr. Federico Gomez will first do exploration and probably AV graft removal on 4/2. I will be OFF starting tomorrow through next week. Dr. Ambika Stanford will see the patient this weekend, Dr. Cassidy Ash will be back on Monday. [x]       High complexity decision making was performed in this patient at high risk for decompensation with multiple organ involvement. Mica Ling is a 39 y.o. male with a history of the above. I was asked by Dr. Cassidy Ash to see him in regards to risk and benefits of system extraction. He has a LUE dialysis AV graft which is being utilized. Seen by vascular surgery, no obvious signs of infection there but some small amount of fluid on imaging at the graft and recurrent bacteremia suggests it is a possible problem. No chest pain, syncope, dizziness, or palpitations. No worsening orthopnea, PND, or edema. No TIA or stroke symptoms.       Allergies   Allergen Reactions    Shellfish Containing Products Swelling     Break out in rash, trouble breathing    Contrast Agent [Iodine] Shortness of Breath    Heparin Analogues Other (comments)     Positive history of HIT          Current Facility-Administered Medications   Medication Dose Route Frequency    0.9% sodium chloride infusion 250 mL  250 mL IntraVENous PRN    vancomycin (VANCOCIN) 500 mg in 0.9% sodium chloride (MBP/ADV) 100 mL MBP  500 mg IntraVENous ONCE    warfarin (COUMADIN) tablet 5 mg  5 mg Oral ONCE    [START ON 4/3/2021] 0.9% sodium chloride infusion  25 mL/hr IntraVENous CONTINUOUS    melatonin tablet 3 mg  3 mg Oral QHS PRN    WARFARIN INFORMATION NOTE (COUMADIN)   Other QPM    Vancomycin - Pharmacy to dose   Other Rx Dosing/Monitoring    atorvastatin (LIPITOR) tablet 20 mg  20 mg Oral DAILY    aspirin chewable tablet 81 mg  81 mg Oral DAILY    metoprolol succinate (TOPROL-XL) XL tablet 12.5 mg  12.5 mg Oral DAILY    pantoprazole (PROTONIX) tablet 40 mg  40 mg Oral ACB    calcitRIOL (ROCALTROL) capsule 0.5 mcg  0.5 mcg Oral DAILY    sodium chloride (NS) flush 5-40 mL  5-40 mL IntraVENous Q8H    sodium chloride (NS) flush 5-40 mL  5-40 mL IntraVENous PRN    acetaminophen (TYLENOL) tablet 650 mg  650 mg Oral Q6H PRN    Or    acetaminophen (TYLENOL) suppository 650 mg  650 mg Rectal Q6H PRN    polyethylene glycol (MIRALAX) packet 17 g  17 g Oral DAILY PRN    ondansetron (ZOFRAN ODT) tablet 4 mg  4 mg Oral Q8H PRN    Or    ondansetron (ZOFRAN) injection 4 mg  4 mg IntraVENous Q6H PRN    calcium acetate(phosphat bind) (PHOSLO) capsule 1,334 mg  2 Cap Oral TID WITH MEALS    epoetin emilie-epbx (RETACRIT) 12,000 Units combo injection  12,000 Units SubCUTAneous Q MON, WED & FRI    oxyCODONE-acetaminophen (PERCOCET) 5-325 mg per tablet 1 Tab  1 Tab Oral Q6H PRN       Review of Symptoms:    General: +fever, chills, sweats that have resolved, negative for weakness, weight loss   ERespiratory: negative for cough, sputum production, wheezing, hemoptysis, or pleuritic pain   Cardiology: negative for chest pain, palpitations, orthopnea, PND, edema, syncope      Objective:      Physical Exam:  Temp (24hrs), Av.5 °F (36.9 °C), Min:98 °F (36.7 °C), Max:99.7 °F (37.6 °C)    Patient Vitals for the past 8 hrs:   Pulse   21 1448 87   21 1401 83   21 1237 77   21 1112 80   21 0930 74   21 0915 83   21 0904 88    Patient Vitals for the past 8 hrs:   Resp   21 1448 16   21 1401 16   21 1237 16   21 0930 16   21 0915 16   21 0904 16    Patient Vitals for the past 8 hrs:   BP 04/02/21 1448 136/84   04/02/21 1401 121/72   04/02/21 1237 (!) 140/77   04/02/21 1112 120/77   04/02/21 0930 110/73   04/02/21 0915 112/71   04/02/21 0904 130/81          Intake/Output Summary (Last 24 hours) at 4/2/2021 1623  Last data filed at 4/2/2021 1401  Gross per 24 hour   Intake 500 ml   Output 2500 ml   Net -2000 ml       Nondiaphoretic, not in acute distress. Looks older than stated age. No scleral icterus, mucous membranes moist, no xanthelasma. L chest ICD site without evidence of pocket infection. L axillary ICD site without evidence of pocket infection. Unlabored, clear to auscultation bilaterally anteriorly, symmetric air movement. Regular rate and rhythm, no new murmur. Reg Savant No JVD but mild lower extremity peripheral edema. Palpable radial pulses bilaterally. Abdomen, soft, nontender, nondistended. Extremities without cyanosis or clubbing. Muscle tone and bulk normal for age. Skin warm and dry. No rashes or ulcers. Neuro grossly nonfocal.  No tremor. Awake and appropriate. CARDIOGRAPHICS and STUDIES, I reviewed:    Telemetry:  SR.    ECG:  Reviewed. Labs:  No results for input(s): CPK, CKMB, CKNDX, TROIQ in the last 72 hours.     No lab exists for component: CPKMB  Lab Results   Component Value Date/Time    Cholesterol, total 139 05/22/2012 05:00 PM    HDL Cholesterol 32 05/22/2012 05:00 PM    LDL, calculated 75.8 05/22/2012 05:00 PM    Triglyceride 156 (H) 05/22/2012 05:00 PM    CHOL/HDL Ratio 4.3 05/22/2012 05:00 PM     Recent Labs     04/02/21  0132 04/01/21  0150 03/31/21  0139   INR 1.5* 1.6* 1.4*   PTP 15.0* 15.9* 14.1*      Recent Labs     04/02/21  0911 04/02/21  0132 04/01/21  1557 04/01/21  0150   * 129*  --  130*   K 4.6 5.1  --  4.3   CL 93* 92*  --  93*   CO2 28 26  --  28   BUN 29* 32*  --  21*   CREA 6.61* 7.61*  --  5.37*   GLU 87 92  --  106*   PHOS 3.4  --   --  3.5   CA 9.0 9.2  --  9.0   ALB 3.1*  --   --   --    WBC  --  15.4*  --   --    HGB 6.1* 6.4* 7.7*  --    HCT 18.8* 19.6* 24.0*  --    PLT  --  265  --   --      Recent Labs     04/02/21  0911   ALB 3.1*     No components found for: GLPOC  No results for input(s): PH, PCO2, PO2 in the last 72 hours.         Flavio Aranda MD  4/2/2021

## 2021-04-02 NOTE — PROGRESS NOTES
.      Hospitalist Progress Note    NAME: Brennan Lopes   :  1975   MRN:  [id number]       Assessment / Plan:  Gram-positive bacteremia (methicillin-resistant staph epidermidis) in 4 bottles 3/27  Had bacteremia in 2020, had femoral dialysis catheter for 2 months at the time (was tunneled catheter)  History of endocarditis  Transthoracic echocardiography with mobile structure in the left ventricle, ejection fraction is 30%mitral regurg  Coronary artery disease   ischemic cardiomyopathy with AICD  CT scan of the lumbar spine is not showing signs of osteomyelitis  Vascular surgery consulted mild fluid around AV graft. Planning exploration tomorrow . Cardiology were consulted and they are looking into the old ICD and leads. EVER 3/30 no signs of endocarditis  3/30 cultures still with 1/2 growing GPC. Culture repeat ordered  discussed case with infection disease Dr. Parag Estrada   going for graft removal today. discussed with Dr. Gavin Cobian for culture of blood after graft removal. Will need permicath insertion (after documenting bacteremia clearance)  Hgb less than 7 (?dialuted sample) was ordered a unit of blood. No signs of bleeding. 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 Code status: Full  Prophylaxis: Coumadin  Recommended Disposition: Home w/Family     Subjective:     Chief Complaint / Reason for Physician Visit  \"feeling better, slept better last night\". To follow-up with Mr. Shobha Starkey with gram-positive bacteremia discussed with RN events overnight.      Review of Systems:  Symptom Y/N Comments  Symptom Y/N Comments   Fever/Chills n   Chest Pain n    Poor Appetite n   Edema     Cough n   Abdominal Pain n    Sputum n   Joint Pain     SOB/ZAMUDIO n   Pruritis/Rash     Nausea/vomit n   Tolerating PT/OT     Diarrhea    Tolerating Diet     Constipation    Other       Could NOT obtain due to:      Objective:     VITALS:   Last 24hrs VS reviewed since prior progress note. Most recent are:  Patient Vitals for the past 24 hrs:   Temp Pulse Resp BP SpO2   04/02/21 1401 98.6 °F (37 °C) 83 16 121/72 100 %   04/02/21 1237 98 °F (36.7 °C) 77 16 (!) 140/77    04/02/21 1112 98 °F (36.7 °C) 80  120/77    04/02/21 0930  74 16 110/73    04/02/21 0915  83 16 112/71    04/02/21 0904 98 °F (36.7 °C) 88 16 130/81    04/02/21 0314 98.3 °F (36.8 °C) 85 16 102/62 100 %   04/01/21 2251 98.9 °F (37.2 °C) 96 20 107/71 94 %   04/01/21 2023 99.7 °F (37.6 °C) (!) 102 20 108/63 98 %   04/01/21 1541 98 °F (36.7 °C) 100 20 127/83 96 %       Intake/Output Summary (Last 24 hours) at 4/2/2021 1444  Last data filed at 4/2/2021 1237  Gross per 24 hour   Intake 100 ml   Output 2500 ml   Net -2400 ml        I had a face to face encounter and independently examined this patient on 4/2/2021, as outlined below:  PHYSICAL EXAM:  General: WD, WN. Alert, cooperative, no acute distress    EENT:  EOMI. Anicteric sclerae. MMM  Resp:  CTA bilaterally, no wheezing or rales. No accessory muscle use  CV:  Regular  rhythm,  No edema  GI:  Soft, Non distended, Non tender. +Bowel sounds  Neurologic:  Alert and oriented X 3, normal speech,   Psych:   Good insight. Not anxious nor agitated  Skin:  No rashes.   No jaundice    Reviewed most current lab test results and cultures  YES  Reviewed most current radiology test results   YES  Review and summation of old records today    NO  Reviewed patient's current orders and MAR    YES  PMH/SH reviewed - no change compared to H&P  ________________________________________________________________________  Care Plan discussed with:    Comments   Patient x    Family      RN x    Care Manager x    Consultant  x Infection disease                    x Multidiciplinary team rounds were held today with , nursing, pharmacist and clinical coordinator. Patient's plan of care was discussed; medications were reviewed and discharge planning was addressed. ________________________________________________________________________  Total NON critical care TIME:  23   Minutes    Total CRITICAL CARE TIME Spent:   Minutes non procedure based      Comments   >50% of visit spent in counseling and coordination of care x    ________________________________________________________________________  Kodak Herman MD     Procedures: see electronic medical records for all procedures/Xrays and details which were not copied into this note but were reviewed prior to creation of Plan. LABS:  I reviewed today's most current labs and imaging studies.   Pertinent labs include:  Recent Labs     04/02/21  0911 04/02/21 0132 04/01/21  1557   WBC  --  15.4*  --    HGB 6.1* 6.4* 7.7*   HCT 18.8* 19.6* 24.0*   PLT  --  265  --      Recent Labs     04/02/21  0911 04/02/21  0132 04/01/21  0150 03/31/21  0139   * 129* 130* 131*   K 4.6 5.1 4.3 4.3   CL 93* 92* 93* 94*   CO2 28 26 28 24   GLU 87 92 106* 101*   BUN 29* 32* 21* 41*   CREA 6.61* 7.61* 5.37* 8.22*   CA 9.0 9.2 9.0 9.1   MG  --   --  2.2  --    PHOS 3.4  --  3.5  --    ALB 3.1*  --   --   --    INR  --  1.5* 1.6* 1.4*       Signed: Kodak Herman MD

## 2021-04-02 NOTE — BRIEF OP NOTE
Brief Postoperative Note    Patient: Patricio Luna  YOB: 1975  MRN: [id number]    Date of Procedure: 4/2/2021     Pre-Op Diagnosis: INFECTED GRAFT    Post-Op Diagnosis: POSSIBLY INFECTED GRAFT    Procedure(s):  EXPLORATION LEFT ARM DIALYSIS GRAFT AND PERIGRAFT CULTURES, open thrombectomy of dialysis graft, ANGIOGRAM, ANGIOPLASTY LEFT ARM DIALYSIS GRAFT(URGENT)    Surgeon(s):  Javy Kaur MD    Surgical Assistant: Surg Asst-1: Aileen Rosa    Anesthesia: General     Estimated Blood Loss (mL): 494 ML    Complications: None    Specimens:   ID Type Source Tests Collected by Time Destination   1 : left arm graft 1 Body Fluid Graft CULTURE, ANAEROBIC AND AEROBIC Javy Kaur MD 4/2/2021 1646 Microbiology   2 : left arm graft site 2 Body Fluid Graft CULTURE, ANAEROBIC AND AEROBIC Javy Kaur MD 4/2/2021 1647 Microbiology        Implants: * No implants in log *    Drains: * No LDAs found *    Findings: AVG evaluated w/ U/S and twoseparate areas w/ questionable perigraft fluid were surgically explored. No obvious infection. Aerobic and anaerobic Cx's sent. At medial site, AVG was so incorporated that a graftotomy was made. The graft was clamped and this was repaired w/ a single suture but the graft clotted. It was difficult to declot the graft w/o heparin. Angiomax was used but this was not clearly effective as he kept clotting. AVG was angioplastied w/ a 7x10 balloon. Good flow on completion. Greater than average EBL due to need for repeated thrombectomy. Will await Cx's but I do not think AVG is infected. OK to use AVG.     Electronically Signed by Rula Reynoso MD on 4/2/2021 at 7:40 PM

## 2021-04-02 NOTE — PERIOP NOTES
1430 -TRANSFER - IN REPORT:    Verbal report received from [facility] RN(name) on Alejandra Im  being received from 2109 - MED/SURG(unit) for ordered procedure      Report consisted of patients Situation, Background, Assessment and   Recommendations(SBAR). Information from the following report(s) Cardiac Rhythm NSR, Pre Procedure Checklist and Procedure Verification was reviewed with the receiving nurse. Opportunity for questions and clarification was provided. Assessment completed upon patients arrival to unit and care assumed. 1448 - PT ARRIVED INTO PRE-OP 16 - PT A&O X4. DAVILA SPON AND TO COMMAND. RESP EVEN AND UNLABORED. POX ON RA > 96%. RFA IV SITE BENIGN. LEFT ARM AV FISTULA WITH +BRUIT AND THRILL. PT STATES 2/10 BACK AND NECK PAIN - ABLE TO REST IN NAPS. PRE-OP TCHING DONE - PT VERBALIZE UNDERSTANDING. SR UP X4 AND CB IN PLACE.            I

## 2021-04-02 NOTE — PROGRESS NOTES
Nephrology Progress Note  Blaise Leblanc     [facility]. [facility]  Phone - [phone]   Patient: Laila Gregory    YOB: 1975        Date- 4/2/2021   Admit Date: 3/29/2021  CC: Follow up for ESRD        IMPRESSION & PLAN:   · End-stage renal disease home HD 5 days per week- Follows up with Dr Jessica Ch at [facility]  · Failed RTX times 2  · Hyperkalemia  · Gram positive sepsis  · Anemia of ckd  · Hx of renal tx in past times 2.  · Hypertension  · CAD  · H/o DVT, s/p ivc filter/ h/o HIT     Plan:  -Plan for dialysis today  -Plan to continue Monday Wednesday Friday dialysis  -Vancomycin per ID.  -Ongoing work up for source of infection. -Appreciated Dr Luli Asif input, plan for AVG exploration today. Subjective: Interval History:   -  Feels better  - Seen on HD today. Objective:   Vitals:    04/02/21 0314 04/02/21 0904 04/02/21 0915 04/02/21 0930   BP: 102/62 130/81 112/71 110/73   Pulse: 85 88 83 74   Resp: 16 16 16 16   Temp: 98.3 °F (36.8 °C) 98 °F (36.7 °C)     TempSrc:  Oral     SpO2: 100%      Weight:       Height:          04/01 0701 - 04/02 0700  In: 100 [P.O.:100]  Out: 0   Last 3 Recorded Weights in this Encounter    03/30/21 1354 03/30/21 2021 04/01/21 0625   Weight: 65 kg (143 lb 4.8 oz) 64.3 kg (141 lb 12.1 oz) 65.4 kg (144 lb 2.9 oz)      Physical exam:   GEN: NAD  NECK- Supple, no mass  RESP: No wheezing, clear b/l  CVS: S1,S2  RRR  NEURO: Normal speech, nonfocal   EXT: No Edema   PSYCH: Normal Mood    Chart reviewed. Pertinent Notes reviewed.      Data Review :  Recent Labs     04/02/21  0911 04/02/21  0132 04/01/21  0150   * 129* 130*   K 4.6 5.1 4.3   CL 93* 92* 93*   CO2 28 26 28   BUN 29* 32* 21*   CREA 6.61* 7.61* 5.37*   GLU 87 92 106*   CA 9.0 9.2 9.0   MG  --   --  2.2   PHOS 3.4  --  3.5     Recent Labs     04/02/21  0911 04/02/21  0132 04/01/21  1557   WBC  --  15.4*  --    HGB 6.1* 6.4* 7.7*   HCT 18.8* 19.6* 24.0*   PLT  -- 265  --      No results for input(s): FE, TIBC, PSAT, FERR in the last 72 hours.    Medication list  reviewed  Current Facility-Administered Medications   Medication Dose Route Frequency    0.9% sodium chloride infusion 250 mL  250 mL IntraVENous PRN    melatonin tablet 3 mg  3 mg Oral QHS PRN    WARFARIN INFORMATION NOTE (COUMADIN)   Other QPM    Vancomycin - Pharmacy to dose   Other Rx Dosing/Monitoring    atorvastatin (LIPITOR) tablet 20 mg  20 mg Oral DAILY    aspirin chewable tablet 81 mg  81 mg Oral DAILY    metoprolol succinate (TOPROL-XL) XL tablet 12.5 mg  12.5 mg Oral DAILY    pantoprazole (PROTONIX) tablet 40 mg  40 mg Oral ACB    calcitRIOL (ROCALTROL) capsule 0.5 mcg  0.5 mcg Oral DAILY    sodium chloride (NS) flush 5-40 mL  5-40 mL IntraVENous Q8H    sodium chloride (NS) flush 5-40 mL  5-40 mL IntraVENous PRN    acetaminophen (TYLENOL) tablet 650 mg  650 mg Oral Q6H PRN    Or    acetaminophen (TYLENOL) suppository 650 mg  650 mg Rectal Q6H PRN    polyethylene glycol (MIRALAX) packet 17 g  17 g Oral DAILY PRN    ondansetron (ZOFRAN ODT) tablet 4 mg  4 mg Oral Q8H PRN    Or    ondansetron (ZOFRAN) injection 4 mg  4 mg IntraVENous Q6H PRN    calcium acetate(phosphat bind) (PHOSLO) capsule 1,334 mg  2 Cap Oral TID WITH MEALS    epoetin emilie-epbx (RETACRIT) 12,000 Units combo injection  12,000 Units SubCUTAneous Q MON, WED & FRI    oxyCODONE-acetaminophen (PERCOCET) 5-325 mg per tablet 1 Tab  1 Tab Oral Q6H PRN          Jenny Ferguson MD              [facility]  [facility] / [employer]  Lizette Jerez [address]  [place], [address]  Phone - [phone]               Fax - [phone]

## 2021-04-02 NOTE — PROGRESS NOTES
HD TRANSFER - OUT REPORT:    Verbal report given to [address] on Piyush Rowan being transferred to [place] [address] for continuing care. Report consisted of patient's Situation, Background, Assessment and   Recommendations(SBAR). Information from the following report(s) dialysis treatment note was reviewed with the receiving nurse. Method:  $$ Method: Hemodialysis (04/02/21 9977)    Fluid Removed  NET Fluid Removed (mL): 2500 ml (04/02/21 1237)     Patient response to treatment:  well    End Time  Hemodialysis End Time: 5139 (04/02/21 1237)  If not documented, dialysis nurse to update post-dialysis row in HD/Filtration flowsheet     Medications /Volume expansion agents or Fluid boluses administered during treatment? no    Post-dialysis medication administration due?  no  Remind nurse to administer post-HD medication upon return to unit. Fistula hemostasis? yes    Lines: SUZANNE POSADAS    Opportunity for questions and clarification was provided.       Patient transported with: belongings by staff in stable condition

## 2021-04-02 NOTE — PROCEDURES
Winter Dialysis Team [facility]  [phone]    Vitals   Pre   Post   Assessment   Pre   Post     Temp  Temp: 98 °F (36.7 °C) (04/02/21 0904)  98.0 LOC  A&OX4 A&OX4   HR   Pulse (Heart Rate): 88 (04/02/21 0904) 77 Lungs   CTA CTA   B/P   BP: 130/81 (04/02/21 0904) 140/77 Cardiac   SR-ST, HRR S1, S2 present SR-ST, HRR S1, S2 present   Resp   Resp Rate: 16 (04/02/21 0904) 16 Skin   CDI CDI   Pain level  Pain Intensity 1: 1 (04/02/21 0314) 0 Edema  3+ BLE     3+ BLE   Orders:    Duration:   Start:    09:04 End:    12:37 Total:   3.5 hrs   Dialyzer:   Dialyzer/Set Up Inspection: Perry Moreland (04/02/21 0904)   K Bath:   Dialysate K (mEq/L): 2 (04/02/21 0904)   Ca Bath:   Dialysate CA (mEq/L): 2.5 (04/02/21 0904)   Na/Bicarb:   Dialysate NA (mEq/L): 138 (04/02/21 0904)   Target Fluid Removal:   Goal/Amount of Fluid to Remove (mL): 2500 mL (04/02/21 0904)   Access     Type & Location:   SUZANNE AVG: skin CDI. No s/s of infection. + B/T. No issues with cannulation or hemostasis. Running well at .     Labs     Obtained/Reviewed   Critical Results Called   Date when labs were drawn-  Hgb-    HGB   Date Value Ref Range Status   04/02/2021 6.1 (L) 12.1 - 17.0 g/dL Final     K-    Potassium   Date Value Ref Range Status   04/02/2021 4.6 3.5 - 5.1 mmol/L Final     Ca-   Calcium   Date Value Ref Range Status   04/02/2021 9.0 8.5 - 10.1 MG/DL Final     Bun-   BUN   Date Value Ref Range Status   04/02/2021 29 (H) 6 - 20 MG/DL Final     Creat-   Creatinine   Date Value Ref Range Status   04/02/2021 6.61 (H) 0.70 - 1.30 MG/DL Final        Medications/ Blood Products Given     Name   Dose   Route and Time     PRBCs 1 unit  IV with HD started at 11:12             Blood Volume Processed (BVP):    86.5 L Net Fluid   Removed:  2500 ml   Comments   Time Out Done: 09:00  Primary Nurse Rpt Pre: Jo Hernandez RN  Primary Nurse Rpt Post: Renetta Guerra RN  Pt Education: procedural  Care Plan: ongoing  Tx Summary:  Pt arrived to HD suite A&Ox4. Consent signed & on file. SBAR received from Primary RN. 09:04- Pt cannulated with 24X needles per policy & without issue. VSS. Dialysis Tx initiated. 10:00- Pt resting quietly. VSS. Dialysis access visualized and lines intact. 10:15- MD Guzman at bedside. 10:30- Flushed lines with 50 ml of NS to prevent clotting. 11:12-Started X1 unit of PRBCs. 11:30- Flushed lines with 50 ml of NS to prevent clotting. 11:45- BFR set to 425 to prevent arterial pressures from fluctuating below -260.  12:12- PRBCs complete. .  12:37- Tx ended. VSS. All possible blood returned to patient. Hemostasis achieved without issue. Bed locked and in the lowest position, call bell and belongings in reach. SBAR given to Primary, RN. Patient is stable at time of his departure. All Dialysis related medications have been reviewed. Admiting Diagnosis: gram positive bacteremia, leukocytosis  Pt's previous clinic- home dialysis  Consent signed - Informed Consent Verified: Yes (04/02/21 0904)  Winter Consent - on file  Hepatitis Status- HbAg negative and immune 02/10/2021  Machine #- Machine Number: [id number] (04/02/21 7493)  Telemetry status- SR-ST per primary RN  Pre-dialysis wt. -

## 2021-04-02 NOTE — PROGRESS NOTES
Pharmacy Daily Dosing of Warfarin    Indication: AFib    Goal INR: 2-3    PTA Warfarin Dose: warfarin 2.5 mg daily    Concurrent anticoagulants: NA    Concurrent antiplatelet: aspirin    Major Interacting Medications   Drugs that may increase INR: NA  Drugs that may decrease INR: NA    Conditions that may increase/decrease INR (CHF, C. diff, cirrhosis, thyroid disorder, hypoalbuminemia): NA    Labs:  Recent Labs     04/02/21  0911 04/02/21  0132 04/01/21  0150 04/01/21  0150 03/31/21  0139   INR  --  1.5*  --  1.6* 1.4*   HGB 6.1* 6.4*   < >  --   --    PLT  --  265  --   --   --    ALB 3.1*  --   --   --   --     < > = values in this interval not displayed. Impression/Plan:   Will order warfarin 5 mg PO x 1 dose. Daily INR  CBC w/o diff QOD     Pharmacy will follow daily and adjust the dose as appropriate. Thanks  Jhon Almodovar PHARMD      [email or web address]%[email or web address]. pdf

## 2021-04-02 NOTE — PROGRESS NOTES
Pharmacy Automatic Renal Dosing Protocol - Antimicrobials    Indication for Antimicrobials: Bacteremia (MRSE) Hx of endocarditis     Current Regimen of Each Antimicrobial:  Vancomycin per HD protocol (Start date 3/29; day 5)    Previous Antimicrobial Therapy:    Goal Level:   (AUC: 400 - 600 mg/hr/Liter/day)     Date Dose & Interval Measured (mcg/mL) Extrapolated (mcg/mL)                       Date & time of next level:    Significant Cultures: PBCx - 3/27 MRSE / @ prelim    Radiology / Imaging results: (X-ray, CT scan or MRI): na    Paralysis, amputations, malnutrition: na    Labs:  Recent Labs     21  0132 21  0150 21  0139   CREA 7.61* 5.37* 8.22*   BUN 32* 21* 41*   WBC 15.4*  --   --      Temp (24hrs), Av.7 °F (37.1 °C), Min:98 °F (36.7 °C), Max:99.7 °F (37.6 °C)    Is the Patient on Dialysis? MWF    Creatinine Clearance (mL/min):   CrCl (Actual Body Weight): 11.6  CrCl (Adjusted Body Weight): 11.7  CrCl (Ideal Body Weight): 11.7    Impression/Plan:   Random level was at goal  Vancomycin started at 1500 mg now then 500 mg after each dialysis session. BMP daily. Antimicrobial stop date: TBD. Pharmacy will follow daily and adjust medications as appropriate for renal function and/or serum levels. Thank you,  Jossie Villarreal, PHARMD    Recommended duration of therapy  [email or web address]/[facility]/Pharmacy/Clinical%20Companion/Duration%20of%[email or web address]. docx    Renal Dosing  [email or web address]/[facility]/Pharmacy/Clinical%20Companion/Renal%[email or web address]. pdf

## 2021-04-02 NOTE — PROGRESS NOTES
Received notification from bedside RN about patient with regards to: H/H 6.4/19.6  VS: /62, HR 85, RR 16, O2 sat 100% on RA    Intervention given: Transfuse 1 unit PRBC ordered

## 2021-04-02 NOTE — PROGRESS NOTES
TRANSFER - IN REPORT:    Verbal report received from [facility] on [facility]  being received from room 2109 for hemodialysis treatment. Report consisted of patients Situation, Background, Assessment and   Recommendations(SBAR). Information from the following report(s) kardex was reviewed with the receiving nurse. Opportunity for questions and clarification was provided. Assessment completed upon patients arrival to unit and care assumed.

## 2021-04-02 NOTE — PERIOP NOTES
Patient's blood units not ready, informed by blood bank staff that the closest units of blood are in [place], [place] may take 2 hours. Dr Inga Austin and Lise Justice RN PACU notified. Per Dr Inga Austin order for one unit of blood with transfusion order to be started when blood arrives for patient.

## 2021-04-02 NOTE — PROGRESS NOTES
3:15PM:  CM went to room to speak with patient about discharge plans but patient left for OR per  Nurse Gilda Muniz at [facility].       9:44AM:  Patient off unit in Hemodialysis at this time when CM attempted to do assessment for discharge planning. CM will follow up.     Bascom Gilford, RN  Care Manager  Ext [phone]

## 2021-04-02 NOTE — ANESTHESIA PREPROCEDURE EVALUATION
Relevant Problems   No relevant active problems       Anesthetic History     PONV          Review of Systems / Medical History  Patient summary reviewed, nursing notes reviewed and pertinent labs reviewed    Pulmonary  Within defined limits                 Neuro/Psych   Within defined limits  seizures         Cardiovascular  Within defined limits  Hypertension      CHF: NYHA Classification III  Dysrhythmias   Pacemaker, past MI, CAD and cardiac stents    Exercise tolerance: <4 METS  Comments: TTE 3/2021  · LV: Estimated LVEF is 30 - 35%. Normal cavity size. Moderately and globally reduced systolic function. Inconclusive left ventricular diastolic function. · LA: Moderately dilated left atrium. · MV: Mild mitral valve regurgitation is present. · PA: Mild pulmonary hypertension. Pulmonary arterial systolic pressure is 41 mmHg. · There is a mobile structure in the left ventricle of unclear etiology. Consider EVER if clinically indicated    EVER  · No signs of endocarditis by EVER. · Mobile structure seen on TTE unclear, may have been artifact vs. left sided moderator band, the sub valvular mitral apparatus does show some calcium in the pappillary muscle and cords, but no signs of endocarditis.     EKG  Normal sinus rhythm   T wave abnormality, consider inferior ischemia   Prolonged QT    GI/Hepatic/Renal  Within defined limits       Renal disease: ESRD and dialysis       Endo/Other        Anemia     Other Findings   Comments: Hb 6.1           Physical Exam    Airway  Mallampati: II  TM Distance: 4 - 6 cm  Neck ROM: normal range of motion   Mouth opening: Normal     Cardiovascular  Regular rate and rhythm,  S1 and S2 normal,  no murmur, click, rub, or gallop             Dental    Dentition: Poor dentition     Pulmonary  Breath sounds clear to auscultation               Abdominal  GI exam deferred       Other Findings            Anesthetic Plan    ASA: 4  Anesthesia type: general          Induction: Intravenous  Anesthetic plan and risks discussed with: Patient      Last dialysis session today

## 2021-04-02 NOTE — PROGRESS NOTES
3:55pm:  Patient had Hemodialysis today; he is currently in OR at this time. CM called patient's sister Richar Cho [phone] and confirmed demographics and initial info with her. Sister was unable to confirm PCP for patient, so this needs to be confirmed. Patient was not able to be interviewed so readmission assessment was not completed. Sister stated that she did not know a lot of the answers of the information/questions that were asked. Transition of Care Plan:    RUR:   31   %    Disposition:  TBD: possibly home with sister who is his caregiver; currently patient and sister who is also his caregiver and assistant with his home hemodialysis reside in an apartment together. Patient has been independent with his activities of daily living including his home hemodialysis and he is assisted with his home hemodialysis through [facility] on [address]. Follow up appointments: Will be needed - PCP; Renal; cardiology; Vascular;  ID?    DME needed:  TBD; at this time patient has all of his home hemodialysis equipment and supplies and he gets continued shipments that are arranged through [facility] on [address]; patient has a blood pressure monitoring unit. Preferred Rx is [facility] on [address] and [address]. Transportation at Discharge: his car is here as he drove himself here the day of his admission    [place] or means to access home:  Both patient and sister Richar Cho [phone] has keys to the apt,        IM Medicare letter:   2nd IM Letter will be needed prior to discharge    Caregiver Contact:  SisterRichar [phone], was contacted today by CM. She provided as much information as she could    Discharge Caregiver contacted prior to discharge?    Richar Chapman [phone] will be contacted closer to discharge     Reason for Admission:   Gram positive bacteremia; ESRD; Leucocytosis                 RUR Score:     31  %      PCP: First and Last name: Sarah López MD - unable to be confirmed as sister who CM spoke with stated she does not know who PCP is     Name of Practice:   Are you a current patient: Yes/No:   Approximate date of last visit:    Can you do a virtual visit with your PCP:              Resources/supports as identified by patient/family:   Patient has medicare and medicaid                Top Challenges facing patient (as identified by patient/family and CM): Finances/Medication cost?      Medicaid assists with cost of meds              Transportation? Medicaid assists with transportation if patient is not able to drive himself              Support system or lack thereof? He has his sister Lauren Daily as a partner with his home hemodialysis and she also helps him at the apartment                     Living arrangements? Resides with sister in apartment              Self-care/ADLs/Cognition? Per sister, patient has been independent with his ADL's and Home hemodialysis - sister also helps him with home hemodialysis          Current Advanced Directive/Advance Care Plan:  Full Code    CM was unable to discuss ACP with patient. Payor Source Payor: [place] MEDICARE / Plan: [address] [place], [place] [id number] / Product Type: Medicare /                             Plan for utilizing home health:    TBD                 Transition of Care Plan:        TBD but possibly home with his sister who is his caregiver and assistant with home hemo.         Care Management Interventions  PCP Verified by CM: No(Sister is not sure who PCP for patient is at this point.)  Mode of Transport at Discharge: Self(Patient drove himself to the hospital and his car is currently at hospital per sister Yasmin Zendejas )  Transition of Care Consult (CM Consult): Discharge Planning(Patient is his provider of Home Hemodialysis 5 days a week with his sister Yasmin Zendejas assisting him)  Discharge Durable Medical Equipment: No  Physical Therapy Consult: No  Occupational Therapy Consult: No  Speech Therapy Consult: No  Current Support Network: Lives with Caregiver(Patient and sister Karly Chaves reside together in an apt )  Confirm Follow Up Transport: Self(per sister, patient drives self to MD appts)  Discharge Location  Discharge Placement: Unable to determine at this time([facility] not known at this time )     Care Manager will continue to follow and assist with discharge planning.     Oneida Watson RN  Care Manager  Ext [phone]

## 2021-04-02 NOTE — PERIOP NOTES
Handoff Report from Operating Room to PACU    Report received from MANUELITO Zambrano and TROY Varma regarding Radha Santiago. Surgeon(s):  Brianda Qiu MD  And Procedure(s) (LRB):  EXPLORATION LEFT ARM DIALYSIS GRAFT ,open thrombectomy of dialysis graft, ANGIOGRAM, ANGIOPLASTY LEFT ARM DIALYSIS GRAFT(URGENT) (Left)  confirmed   with allergies and dressings discussed. Anesthesia type, drugs, patient history, complications, estimated blood loss, vital signs, intake and output, and blood products received, last pain medication, lines, reversal medications and temperature were reviewed.

## 2021-04-02 NOTE — PROGRESS NOTES
Infectious Disease Progress Note         Interval:  NAEO    Subjective:   Seen at dialysis this morning. Objective:    Vitals:   Patient Vitals for the past 24 hrs:   Temp Pulse Resp BP SpO2   04/02/21 0314 98.3 °F (36.8 °C) 85 16 102/62 100 %   04/01/21 2251 98.9 °F (37.2 °C) 96 20 107/71 94 %   04/01/21 2023 99.7 °F (37.6 °C) (!) 102 20 108/63 98 %   04/01/21 1541 98 °F (36.7 °C) 100 20 127/83 96 %   04/01/21 1236 98.7 °F (37.1 °C) 100 20 115/66 100 %       Physical Exam:  ? GEN: NAD  ? HEENT: Normocephalic, atraumatic, PERRL, no scleral icterus  ? CV: S1, S2 heard regularly, no murmurs, + thrill at the AV graft left arm, pacemaker sites appear benign - both the left lateral chest and the anterior chest region. ? Lungs: Clear to auscultation bilaterally  ? Abdomen: soft, non distended, non tende  ? Genitourinary: no oneal  ? Extremities: no edema  ? Neuro: Alert, oriented to time, place and situation, moves all extremities to commands, verbal   ? Skin: no rash  ? Psych: good affect, good eye contact, non tearful   ?  Lines: left arm AV graft        Labs:  Recent Results (from the past 24 hour(s))   HGB & HCT    Collection Time: 04/01/21  3:57 PM   Result Value Ref Range    HGB 7.7 (L) 12.1 - 17.0 g/dL    HCT 24.0 (L) 36.6 - 50.3 %   CULTURE, BLOOD, PAIRED    Collection Time: 04/01/21  4:19 PM    Specimen: Blood   Result Value Ref Range    Special Requests: NO SPECIAL REQUESTS      Culture result: NO GROWTH AFTER 14 HOURS     PROTHROMBIN TIME + INR    Collection Time: 04/02/21  1:32 AM   Result Value Ref Range    INR 1.5 (H) 0.9 - 1.1      Prothrombin time 15.0 (H) 9.0 - 40.2 sec   METABOLIC PANEL, BASIC    Collection Time: 04/02/21  1:32 AM   Result Value Ref Range    Sodium 129 (L) 136 - 145 mmol/L    Potassium 5.1 3.5 - 5.1 mmol/L    Chloride 92 (L) 97 - 108 mmol/L    CO2 26 21 - 32 mmol/L    Anion gap 11 5 - 15 mmol/L    Glucose 92 65 - 100 mg/dL    BUN 32 (H) 6 - 20 MG/DL    Creatinine 7.61 (H) 0.70 - 1.30 MG/DL    BUN/Creatinine ratio 4 (L) 12 - 20      GFR est AA 10 (L) >60 ml/min/1.73m2    GFR est non-AA 8 (L) >60 ml/min/1.73m2    Calcium 9.2 8.5 - 10.1 MG/DL   CBC W/O DIFF    Collection Time: 04/02/21  1:32 AM   Result Value Ref Range    WBC 15.4 (H) 4.1 - 11.1 K/uL    RBC 2.00 (L) 4.10 - 5.70 M/uL    HGB 6.4 (L) 12.1 - 17.0 g/dL    HCT 19.6 (L) 36.6 - 50.3 %    MCV 98.0 80.0 - 99.0 FL    MCH 32.0 26.0 - 34.0 PG    MCHC 32.7 30.0 - 36.5 g/dL    RDW 16.7 (H) 11.5 - 14.5 %    PLATELET 094 099 - 432 K/uL    MPV 10.3 8.9 - 12.9 FL    NRBC 0.0 0  WBC    ABSOLUTE NRBC 0.00 0.00 - 0.01 K/uL   VANCOMYCIN, RANDOM    Collection Time: 04/02/21  1:32 AM   Result Value Ref Range    Vancomycin, random 19.1 UG/ML   TYPE & SCREEN    Collection Time: 04/02/21  5:43 AM   Result Value Ref Range    Crossmatch Expiration 04/05/2021,2359     ABO/Rh(D) Lizzy Nova POSITIVE     Antibody screen NEG     Unit number [id number]     Blood component type RC LRIR     Unit division 00     Status of unit ALLOCATED     Crossmatch result Compatible    RBC, ALLOCATE    Collection Time: 04/02/21  5:45 AM   Result Value Ref Range    HISTORY CHECKED?  Historical check performed    HGB & HCT    Collection Time: 04/02/21  9:11 AM   Result Value Ref Range    HGB 6.1 (L) 12.1 - 17.0 g/dL    HCT 18.8 (L) 36.6 - 50.3 %   RENAL FUNCTION PANEL    Collection Time: 04/02/21  9:11 AM   Result Value Ref Range    Sodium 131 (L) 136 - 145 mmol/L    Potassium 4.6 3.5 - 5.1 mmol/L    Chloride 93 (L) 97 - 108 mmol/L    CO2 28 21 - 32 mmol/L    Anion gap 10 5 - 15 mmol/L    Glucose 87 65 - 100 mg/dL    BUN 29 (H) 6 - 20 MG/DL    Creatinine 6.61 (H) 0.70 - 1.30 MG/DL    BUN/Creatinine ratio 4 (L) 12 - 20      GFR est AA 11 (L) >60 ml/min/1.73m2    GFR est non-AA 9 (L) >60 ml/min/1.73m2    Calcium 9.0 8.5 - 10.1 MG/DL    Phosphorus 3.4 2.6 - 4.7 MG/DL    Albumin 3.1 (L) 3.5 - 5.0 g/dL           Micro:  3/27: Blood cultures 4/4 bottles with Staph epidermidis (Methicillin-R) , Willian Cov 2 negative   3/29: Blood cultures 4/4 bottles positive for MRSE  3/30: Blood cultures with CoNS  4/1: Blood cultures NGSF          Assessment:  38 yo M with:        - Sepsis due to high-grade CoNS bacteremia this admission.   - CAD with MI with 2 AICD placements (2009 - non-functioning due to fractured lead, then a subcutaneous ICD placement in 2019): patient has both devices in him. - High suspicion for endovascular ICD infection given high grade of the bacteremia, recurrence of the bacteremia with Staph epidermidis (MRSE bacteremia is 10/2020 as well). - Possible AV graft infection   - Nephrotic syndome s/p 2 renal transplants. Both transplants failed due to graft-medication non-compliance (last transplant in 2001). - ESRD on iHD, 5 times a week from home since 2012. In 09/2020 had left AV graft placed since complicated by clotting issues. -  MSSA bacteremia in 2017  - MRSE bacteremia in 10/2020 likely femoral HD line-related and possible L1-L2 discitis/osteomyelitis treated with 6 weeks of vancomycin. Since 11/2020, the graft is being used for dialysis.       Recommendations:  - AV graft exploration 4/2/21, possible removal.   - Per Dr. Manjeet Cho, it will be about 2-3 weeks from when the endovascular ICD can be extracted. Patient should continue vancomycin with HD.   - Negative blood cultures have not been documented yet. If the graft gets removed today, please send blood cultures afterwards. Permacath should not be inserted until documented negative blood cultures are seen. Discussed with Dr. Sierra Ríos from Nephrology.   - No signs of infectious involvement of the subcutaneous-ICD clinically on exam. Since all the components of this device are subcutaneous and no exposure to vascular space, suspicion on infectious involvement is low right now and hence extraction is not indicated at this time. Continue to monitor for signs of pocket infection for this device.    - F/u BCx from 3/30 and 4/1. Obtain more if 4/1 BCx turn positive. - Continue vancomycin goal trough 15-20  - Per Dr. Bedoya Fearing   - Duration of vancomycin will be atleast 6 weeks, start date of therapy date yet to be seen, and will be from the date of the ICD extraction.   - Explained plan to patient. - Discussed with Dr. Neelima Hernandez. Will follow. Please call the ID-on call with any questions over the weekend. Thank you for the opportunity to participate in the care of this patient. Please contact with questions or concerns.       Magdalena Cordero MD  Infectious Diseases

## 2021-04-02 NOTE — PROGRESS NOTES
End of Shift Note    Bedside shift change report given to Apsalar (oncoming nurse) by Marissa Koroma RN (offgoing nurse). Report included the following information SBAR, Kardex, Intake/Output, MAR and Recent Results    Shift worked:  7p-7a     Shift summary and any significant changes:     Pt as been NPO since midnight. Took percocet once for pain. Pt's AM Hbg 6.4. Contacted NP, type & screen and transfusion ordered. Sent type & screen, contacted blood bank but blood was not ready. Gave reported to dialysis. Concerns for physician to address:  Hbg     Zone phone for oncoming shift:   [phone]       Activity:  Activity Level: Up ad fouzia  Number times ambulated in hallways past shift: 3+  Number of times OOB to chair past shift: All shift    Cardiac:   Cardiac Monitoring: Yes      Cardiac Rhythm: Normal sinus rhythm    Access:   Current line(s): PIV     Genitourinary:   Urinary status: anuric    Respiratory:   O2 Device: Room air  Chronic home O2 use?: N/A  Incentive spirometer at bedside: N/A     GI:  Last Bowel Movement Date: 03/30/21  Current diet:  DIET NPO  Passing flatus: NO  Tolerating current diet: YES       Pain Management:   Patient states pain is manageable on current regimen: YES    Skin:  Yong Score: 22  Interventions: increase time out of bed    Patient Safety:  Fall Score:  Total Score: 1  Interventions: gripper socks       Length of Stay:  Expected LOS: 4d 19h  Actual LOS: 4      Marissa Koroma, MANUELITO

## 2021-04-03 LAB
ANION GAP SERPL CALC-SCNC: 7 MMOL/L (ref 5–15)
BASOPHILS # BLD: 0 K/UL (ref 0–0.1)
BASOPHILS NFR BLD: 0 % (ref 0–1)
BUN SERPL-MCNC: 23 MG/DL (ref 6–20)
BUN/CREAT SERPL: 4 (ref 12–20)
CALCIUM SERPL-MCNC: 9 MG/DL (ref 8.5–10.1)
CHLORIDE SERPL-SCNC: 99 MMOL/L (ref 97–108)
CO2 SERPL-SCNC: 27 MMOL/L (ref 21–32)
CREAT SERPL-MCNC: 5.49 MG/DL (ref 0.7–1.3)
DIFFERENTIAL METHOD BLD: ABNORMAL
EOSINOPHIL # BLD: 0 K/UL (ref 0–0.4)
EOSINOPHIL NFR BLD: 0 % (ref 0–7)
ERYTHROCYTE [DISTWIDTH] IN BLOOD BY AUTOMATED COUNT: 17.2 % (ref 11.5–14.5)
GLUCOSE SERPL-MCNC: 118 MG/DL (ref 65–100)
HCT VFR BLD AUTO: 23.7 % (ref 36.6–50.3)
HGB BLD-MCNC: 7.7 G/DL (ref 12.1–17)
IMM GRANULOCYTES # BLD AUTO: 0.1 K/UL (ref 0–0.04)
IMM GRANULOCYTES NFR BLD AUTO: 1 % (ref 0–0.5)
INR PPP: 1.4 (ref 0.9–1.1)
LYMPHOCYTES # BLD: 0.7 K/UL (ref 0.8–3.5)
LYMPHOCYTES NFR BLD: 5 % (ref 12–49)
MAGNESIUM SERPL-MCNC: 2.2 MG/DL (ref 1.6–2.4)
MCH RBC QN AUTO: 31.2 PG (ref 26–34)
MCHC RBC AUTO-ENTMCNC: 32.5 G/DL (ref 30–36.5)
MCV RBC AUTO: 96 FL (ref 80–99)
MONOCYTES # BLD: 0.8 K/UL (ref 0–1)
MONOCYTES NFR BLD: 6 % (ref 5–13)
NEUTS SEG # BLD: 11.6 K/UL (ref 1.8–8)
NEUTS SEG NFR BLD: 88 % (ref 32–75)
NRBC # BLD: 0 K/UL (ref 0–0.01)
NRBC BLD-RTO: 0 PER 100 WBC
PHOSPHATE SERPL-MCNC: 4 MG/DL (ref 2.6–4.7)
PLATELET # BLD AUTO: 243 K/UL (ref 150–400)
PMV BLD AUTO: 10.6 FL (ref 8.9–12.9)
POTASSIUM SERPL-SCNC: 4.9 MMOL/L (ref 3.5–5.1)
PROTHROMBIN TIME: 14 SEC (ref 9–11.1)
RBC # BLD AUTO: 2.47 M/UL (ref 4.1–5.7)
RBC MORPH BLD: ABNORMAL
SODIUM SERPL-SCNC: 133 MMOL/L (ref 136–145)
WBC # BLD AUTO: 13.2 K/UL (ref 4.1–11.1)

## 2021-04-03 PROCEDURE — 80048 BASIC METABOLIC PNL TOTAL CA: CPT

## 2021-04-03 PROCEDURE — 74011250636 HC RX REV CODE- 250/636: Performed by: SURGERY

## 2021-04-03 PROCEDURE — 77010033678 HC OXYGEN DAILY

## 2021-04-03 PROCEDURE — 74011250637 HC RX REV CODE- 250/637: Performed by: STUDENT IN AN ORGANIZED HEALTH CARE EDUCATION/TRAINING PROGRAM

## 2021-04-03 PROCEDURE — 65270000029 HC RM PRIVATE

## 2021-04-03 PROCEDURE — 85610 PROTHROMBIN TIME: CPT

## 2021-04-03 PROCEDURE — 83735 ASSAY OF MAGNESIUM: CPT

## 2021-04-03 PROCEDURE — 94760 N-INVAS EAR/PLS OXIMETRY 1: CPT

## 2021-04-03 PROCEDURE — 84100 ASSAY OF PHOSPHORUS: CPT

## 2021-04-03 PROCEDURE — 36415 COLL VENOUS BLD VENIPUNCTURE: CPT

## 2021-04-03 PROCEDURE — 85025 COMPLETE CBC W/AUTO DIFF WBC: CPT

## 2021-04-03 RX ORDER — WARFARIN SODIUM 5 MG/1
5 TABLET ORAL ONCE
Status: COMPLETED | OUTPATIENT
Start: 2021-04-03 | End: 2021-04-03

## 2021-04-03 RX ADMIN — Medication 10 ML: at 06:31

## 2021-04-03 RX ADMIN — Medication 10 ML: at 22:00

## 2021-04-03 RX ADMIN — ATORVASTATIN CALCIUM 20 MG: 20 TABLET, FILM COATED ORAL at 08:41

## 2021-04-03 RX ADMIN — CALCIUM ACETATE 1334 MG: 667 CAPSULE ORAL at 16:34

## 2021-04-03 RX ADMIN — MORPHINE SULFATE 2 MG: 2 INJECTION, SOLUTION INTRAMUSCULAR; INTRAVENOUS at 20:29

## 2021-04-03 RX ADMIN — PANTOPRAZOLE SODIUM 40 MG: 40 TABLET, DELAYED RELEASE ORAL at 06:31

## 2021-04-03 RX ADMIN — CALCIUM ACETATE 1334 MG: 667 CAPSULE ORAL at 11:43

## 2021-04-03 RX ADMIN — CALCIUM ACETATE 1334 MG: 667 CAPSULE ORAL at 08:40

## 2021-04-03 RX ADMIN — MORPHINE SULFATE 2 MG: 2 INJECTION, SOLUTION INTRAMUSCULAR; INTRAVENOUS at 02:03

## 2021-04-03 RX ADMIN — MORPHINE SULFATE 2 MG: 2 INJECTION, SOLUTION INTRAMUSCULAR; INTRAVENOUS at 11:46

## 2021-04-03 RX ADMIN — Medication 3 MG: at 22:17

## 2021-04-03 RX ADMIN — POLYETHYLENE GLYCOL 3350 17 G: 17 POWDER, FOR SOLUTION ORAL at 14:35

## 2021-04-03 RX ADMIN — METOPROLOL SUCCINATE 12.5 MG: 25 TABLET, EXTENDED RELEASE ORAL at 08:41

## 2021-04-03 RX ADMIN — WARFARIN SODIUM 5 MG: 5 TABLET ORAL at 17:12

## 2021-04-03 RX ADMIN — ASPIRIN 81 MG: 81 TABLET, CHEWABLE ORAL at 08:41

## 2021-04-03 RX ADMIN — Medication 10 ML: at 14:37

## 2021-04-03 RX ADMIN — CALCITRIOL CAPSULES 0.25 MCG 0.5 MCG: 0.25 CAPSULE ORAL at 08:41

## 2021-04-03 NOTE — PROGRESS NOTES
.      Hospitalist Progress Note    NAME: Bao Burroughs   :  1975   MRN:  [id number]       Assessment / Plan:  Gram-positive bacteremia (methicillin-resistant staph epidermidis) in 4 bottles 3/27  Had bacteremia in 2020, had femoral dialysis catheter for 2 months at the time (was tunneled catheter)  History of endocarditis  Transthoracic echocardiography with mobile structure in the left ventricle, ejection fraction is 30%mitral regurg  Coronary artery disease   ischemic cardiomyopathy with AICD  CT scan of the lumbar spine is not showing signs of osteomyelitis  Vascular surgery consulted mild fluid around AV graft. Planning exploration tomorrow . Cardiology were consulted and they are looking into the old ICD and leads. EVER 3/30 no signs of endocarditis  3/30 cultures still with 1/2 growing GPC. Culture repeat ordered  discussed case with infection disease Dr. Naima Pires     going for graft removal today. discussed with Dr. Sonia Cabezas for culture of blood after graft removal. Will need permicath insertion (after documenting bacteremia clearance)  Hgb less than 7 (?dialuted sample) was ordered a unit of blood. No signs of bleeding. HH and culture post OP were sent  Operative report with no suspicion of infection. There was complication with some clotting intraoperatively \"   AVG was so incorporated that a graftotomy was made. The graft was clamped and this was repaired w/ a single suture but the graft clotted. It was difficult to declot the graft w/o heparin. Angiomax was used but this was not clearly effective as he kept clotting. AVG was angioplastied w/ a 7x10 balloon. Good flow on completion. Greater than average EBL due to need for repeated thrombectomy. \" received a unit of blood pre and one post operatively.   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 Code status: Full  Prophylaxis: Coumadin  Recommended Disposition: Home w/Family     Subjective:     Chief Complaint / Reason for Physician Visit   To follow-up with Mr. Isabela Eisenberg with gram-positive bacteremia (recurrent) with history ESRD dialysis via left AV graft, ischemic cardiopathy s/p ICD, HLD, gout. discussed with RN events overnight. Review of Systems:  Symptom Y/N Comments  Symptom Y/N Comments   Fever/Chills n   Chest Pain n    Poor Appetite n   Edema     Cough n   Abdominal Pain n    Sputum n   Joint Pain     SOB/ZAMUDIO n   Pruritis/Rash     Nausea/vomit n   Tolerating PT/OT     Diarrhea    Tolerating Diet     Constipation    Other       Could NOT obtain due to:      Objective:     VITALS:   Last 24hrs VS reviewed since prior progress note.  Most recent are:  Patient Vitals for the past 24 hrs:   Temp Pulse Resp BP SpO2   04/03/21 0820 98.1 °F (36.7 °C) 89 17 (!) 98/58 100 %   04/03/21 0216 98.5 °F (36.9 °C) 86 16 113/62 100 %   04/03/21 0114 98.4 °F (36.9 °C) 81 17 106/61 99 %   04/03/21 0014 98.3 °F (36.8 °C) 73 16 102/60 100 %   04/02/21 2332 98.4 °F (36.9 °C) 85 17 98/64 98 %   04/02/21 2317 98.3 °F (36.8 °C) 80 17 (!) 94/57 99 %   04/02/21 2257 98.4 °F (36.9 °C) 85 16 (!) 90/56 98 %   04/02/21 2249 98.5 °F (36.9 °C) 80 16 110/69 98 %   04/02/21 2156 98.6 °F (37 °C) 78 16 113/71 99 %   04/02/21 2051 98.5 °F (36.9 °C) 89 14 111/79 98 %   04/02/21 2030 98.2 °F (36.8 °C) 76 16 (!) 100/58 100 %   04/02/21 2015  74 17 101/67 100 %   04/02/21 2000  78 15 106/66 100 %   04/02/21 1945  75 21 106/66 100 % 04/02/21 1940  77 15 119/71 100 %   04/02/21 1935  83 17 123/80 100 %   04/02/21 1933 98.4 °F (36.9 °C) 76 13 124/76 100 %   04/02/21 1448 98.6 °F (37 °C) 87 16 136/84 97 %   04/02/21 1401 98.6 °F (37 °C) 83 16 121/72 100 %       Intake/Output Summary (Last 24 hours) at 4/3/2021 1250  Last data filed at 4/3/2021 0151  Gross per 24 hour   Intake 1049.6 ml   Output 450 ml   Net 599.6 ml        I had a face to face encounter and independently examined this patient on 4/3/2021, as outlined below:  PHYSICAL EXAM:  General: WD, WN. Alert, cooperative, no acute distress    EENT:  EOMI. Anicteric sclerae. MMM  Resp:  CTA bilaterally, no wheezing or rales. No accessory muscle use  CV:  Regular  rhythm,  No edema  GI:  Soft, Non distended, Non tender. +Bowel sounds  Neurologic:  Alert and oriented X 3, normal speech,   Psych:   Good insight. Not anxious nor agitated  Skin:  No rashes. No jaundice    Reviewed most current lab test results and cultures  YES  Reviewed most current radiology test results   YES  Review and summation of old records today    NO  Reviewed patient's current orders and MAR    YES  PMH/SH reviewed - no change compared to H&P  ________________________________________________________________________  Care Plan discussed with:    Comments   Patient x    Family      RN x    Care Manager x    Consultant  x Infection disease                    x Multidiciplinary team rounds were held today with , nursing, pharmacist and clinical coordinator. Patient's plan of care was discussed; medications were reviewed and discharge planning was addressed.      ________________________________________________________________________  Total NON critical care TIME:  23   Minutes    Total CRITICAL CARE TIME Spent:   Minutes non procedure based      Comments   >50% of visit spent in counseling and coordination of care x    ________________________________________________________________________  Agustin Mcconnell MD [facility]     Procedures: see electronic medical records for all procedures/Xrays and details which were not copied into this note but were reviewed prior to creation of Plan. LABS:  I reviewed today's most current labs and imaging studies.   Pertinent labs include:  Recent Labs     04/03/21 0400 04/02/21 0911 04/02/21 0132   WBC 13.2*  --  15.4*   HGB 7.7* 6.1* 6.4*   HCT 23.7* 18.8* 19.6*     --  265     Recent Labs     04/03/21 0400 04/02/21 0911 04/02/21 0132 04/01/21  0150   * 131* 129* 130*   K 4.9 4.6 5.1 4.3   CL 99 93* 92* 93*   CO2 27 28 26 28   * 87 92 106*   BUN 23* 29* 32* 21*   CREA 5.49* 6.61* 7.61* 5.37*   CA 9.0 9.0 9.2 9.0   MG 2.2  --   --  2.2   PHOS 4.0 3.4  --  3.5   ALB  --  3.1*  --   --    INR 1.4*  --  1.5* 1.6*       Signed: Glenys Natarajan MD

## 2021-04-03 NOTE — PROGRESS NOTES
Vascular    No c/o  Afebrile  AVG did not look infected  Will await perigraft Cx's from OR  OK to use AVG  LLE bypass duplex normal  Would not remove AVG if operative Cx's negative  Following

## 2021-04-03 NOTE — ANESTHESIA POSTPROCEDURE EVALUATION
Procedure(s):  EXPLORATION LEFT ARM DIALYSIS GRAFT ,open thrombectomy of dialysis graft, ANGIOGRAM, ANGIOPLASTY LEFT ARM DIALYSIS GRAFT(URGENT). general    Anesthesia Post Evaluation      Multimodal analgesia: multimodal analgesia used between 6 hours prior to anesthesia start to PACU discharge  Patient location during evaluation: bedside  Patient participation: complete - patient participated  Level of consciousness: awake  Pain management: adequate  Airway patency: patent  Anesthetic complications: no  Cardiovascular status: acceptable  Respiratory status: acceptable  Hydration status: acceptable  Post anesthesia nausea and vomiting:  controlled  Final Post Anesthesia Temperature Assessment:  Normothermia (36.0-37.5 degrees C)      INITIAL Post-op Vital signs:   Vitals Value Taken Time   /66 04/02/21 2000   Temp 36.9 °C (98.4 °F) 04/02/21 1933   Pulse 77 04/02/21 2006   Resp 15 04/02/21 2006   SpO2 100 % 04/02/21 2006   Vitals shown include unvalidated device data.

## 2021-04-03 NOTE — PROGRESS NOTES
Cardiology Progress Note  4/3/2021     Admit Date: 3/29/2021  Admit Diagnosis: Gram-positive bacteremia [R78.81]  Leucocytosis [D72.829]  ESRD (end stage renal disease) on dialysis ([facility] [address] [N18.6, Z99.2]  CC: none currently  Cardiac Assessment/Plan (per Dr Rose Jeffery):   Usual cardiologist:  Lemuel Shaffer MD     Assessment: 1. 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 EVER negative for overt endocarditis. BCX positive from 3/27, 3/29, 3/30. Negative 3/30, 4/1. Now on vancomycin (methicillin resistant). 2. Remote SJM single chamber ICD implant (dual coil shock lead) in 2009 with gen change in 2016 due to battery depletion, subsequent diagnosis of lead fracture, system turned OFF, subcutaneous [employer] ICD implanted at [facility]. No evidence of S-ICD pocket infection. 3. Ischemic cardiomyopathy, primary prevention indication for ICD implant. 4. CAD with anterior MI and remote stenting in 2007. Chronic aspirin therapy. 5. Chronic systolic CHF. 6. Remote history of endocarditis. 7. Remote history of IVC filter due to lower extremity DVT. Chronic anticoagulation with warfarin. 8. ESRD with dialysis. Remote renal transplant x 2 due to renal failure with nephrotic syndrome. Failed transplants, chronic hemodialysis with history of access issues, using LUE AV graft currently. 9. History of heparin associated thrombocytopenia. 10. Anemia due to chronic renal disease. Hgb 6.1 on 4/2. Normal platelets. 11. Iodine contrast reaction (per notes). 12. Full code.     Plan:      1. Endovascular SJM ICD extraction is reasonable to consider but not without substantial risk--this would have to be done outside of [facility]. Would be great if the subcutaneous ICD could stay in place, but that could come out too if needed at lower risk. .  2. He is not pacemaker-dependent nor has he received therapy for VT-VF, this is a primary prevention device implant.     I discussed the issues with the patient, he's on board with whatever is recommended. Due to CT surgery backup issues, would be about 2-3 weeks out from a potential endovascular system extraction I am told.     It seems that he's gotten his latest cardiology care at [facility], so can contact them regarding appropriateness for extraction but first he needs to undergo his left upper extremity AV graft surgery. The number for [facility] is [phone], choose option 5 for physician to physician communication.     Dr. Debbie Paz will first do exploration and probably AV graft removal on .     I will be OFF starting tomorrow through next week. Dr. Fern Garcia will see the patient this weekend, Dr. Carlos Alberto Mcdonald will be back on Monday. 4/3: No CP/dyspnea. BP ; HR 70-80s; 100% RA. WBC 13.2; Hg 7.7 from 6.1; Cr 5.5. Na 133. INR 1.4. Cardiac meds: asa; lipitor 20; toprol Xl 12.5; coumadin. No new cardiac recs; plans as above. For other plans, see orders.   Hospital problem list     Active Hospital Problems    Diagnosis Date Noted    Leucocytosis 2021    Gram-positive bacteremia 2021    ESRD (end stage renal disease) on dialysis ([facility] 2013        Subjective: Gomez Garrett reports       Chest pain X none  consistent with:  Non-cardiac CP         Atypical CP     None now  On going  Anginal CP     Dyspnea X none  at rest  with exertion         improved  unchanged  worse              PND X none  overnight       Orthopnea X none  improved  unchanged  worse   Presyncope X none  improved  unchanged  worse     Ambulated in hallway without symptoms   Yes   Ambulated in room without symptoms  Yes   Objective:     Physical Exam:  Overall VSSAF;    Visit Vitals  BP (!) 98/58 (BP 1 Location: Right upper arm, BP Patient Position: Sitting)   Pulse 89   Temp 98.1 °F (36.7 °C)   Resp 17   Ht 5' 7\" (1.702 m)   Wt 65.4 kg (144 lb 2.9 oz)   SpO2 100%   BMI 22.58 kg/m²     Temp (24hrs), Av.4 °F (36.9 °C), Min:98.1 °F (36.7 °C), Max:98.6 °F (37 °C)    Patient Vitals for the past 8 hrs:   Pulse   04/03/21 0820 89     Patient Vitals for the past 8 hrs:   Resp   04/03/21 0820 17     Patient Vitals for the past 8 hrs:   BP   04/03/21 0820 (!) 98/58       Intake/Output Summary (Last 24 hours) at 4/3/2021 1322  Last data filed at 4/3/2021 0151  Gross per 24 hour   Intake 1049.6 ml   Output 450 ml   Net 599.6 ml     General Appearance: Well developed, well nourished, no acute distress. Chronically ill   Ears/Nose/Mouth/Throat:   Normal MM; anicteric. JVP: WNL   Resp:   Lungs clear to auscultation bilaterally. Nl resp effort. Cardiovascular:  RRR, S1, S2 normal, no new murmur. No gallop or rub. Abdomen:   Soft, non-tender, bowel sounds are present. Extremities: Mild edema bilaterally. Skin:  Neuro: Warm and dry. A/O x3, grossly nonfocal       cath site intact w/o hematoma or new bruit; distal pulse unchanged  Yes   Data Review:     Telemetry independently reviewed x sinus  chronic afib  parox afib  NSVT     ECG independently reviewed  NSR  afib  no significant changes  NSST-Tw chgs     x no new ECG provided for review   Lab results reviewed as noted below. Current medications reviewed as noted below. No results for input(s): PH, PCO2, PO2 in the last 72 hours. No results for input(s): CPK, CKMB, CKNDX, TROIQ in the last 72 hours. Recent Labs     04/03/21  0400 04/02/21  0911 04/02/21  0132 04/01/21  0150 04/01/21  0150   * 131* 129*  --  130*   K 4.9 4.6 5.1  --  4.3   CL 99 93* 92*  --  93*   CO2 27 28 26  --  28   BUN 23* 29* 32*  --  21*   CREA 5.49* 6.61* 7.61*  --  5.37*   GFRAA 14* 11* 10*  --  14*   * 87 92  --  106*   PHOS 4.0 3.4  --   --  3.5   CA 9.0 9.0 9.2  --  9.0   ALB  --  3.1*  --   --   --    WBC 13.2*  --  15.4*  --   --    HGB 7.7* 6.1* 6.4*   < >  --    HCT 23.7* 18.8* 19.6*   < >  --      --  265  --   --     < > = values in this interval not displayed.      Lab Results   Component Value Date/Time Cholesterol, total 139 05/22/2012 05:00 PM    HDL Cholesterol 32 05/22/2012 05:00 PM    LDL, calculated 75.8 05/22/2012 05:00 PM    Triglyceride 156 (H) 05/22/2012 05:00 PM    CHOL/HDL Ratio 4.3 05/22/2012 05:00 PM     Recent Labs     04/02/21  0911   ALB 3.1*     Recent Labs     04/03/21  0400 04/02/21  0132 04/01/21  0150   INR 1.4* 1.5* 1.6*   PTP 14.0* 15.0* 15.9*      No components found for: GLPOC    Current Facility-Administered Medications   Medication Dose Route Frequency    0.9% sodium chloride infusion 250 mL  250 mL IntraVENous PRN    0.9% sodium chloride infusion 250 mL  250 mL IntraVENous PRN    0.9% sodium chloride infusion 250 mL  250 mL IntraVENous PRN    morphine injection 2 mg  2 mg IntraVENous Q3H PRN    melatonin tablet 3 mg  3 mg Oral QHS PRN    WARFARIN INFORMATION NOTE (COUMADIN)   Other QPM    Vancomycin - Pharmacy to dose   Other Rx Dosing/Monitoring    atorvastatin (LIPITOR) tablet 20 mg  20 mg Oral DAILY    aspirin chewable tablet 81 mg  81 mg Oral DAILY    metoprolol succinate (TOPROL-XL) XL tablet 12.5 mg  12.5 mg Oral DAILY    pantoprazole (PROTONIX) tablet 40 mg  40 mg Oral ACB    calcitRIOL (ROCALTROL) capsule 0.5 mcg  0.5 mcg Oral DAILY    sodium chloride (NS) flush 5-40 mL  5-40 mL IntraVENous Q8H    sodium chloride (NS) flush 5-40 mL  5-40 mL IntraVENous PRN    acetaminophen (TYLENOL) tablet 650 mg  650 mg Oral Q6H PRN    Or    acetaminophen (TYLENOL) suppository 650 mg  650 mg Rectal Q6H PRN    polyethylene glycol (MIRALAX) packet 17 g  17 g Oral DAILY PRN    ondansetron (ZOFRAN ODT) tablet 4 mg  4 mg Oral Q8H PRN    Or    ondansetron (ZOFRAN) injection 4 mg  4 mg IntraVENous Q6H PRN    calcium acetate(phosphat bind) (PHOSLO) capsule 1,334 mg  2 Cap Oral TID WITH MEALS    epoetin emilie-epbx (RETACRIT) 12,000 Units combo injection  12,000 Units SubCUTAneous Q MON, WED & FRI    oxyCODONE-acetaminophen (PERCOCET) 5-325 mg per tablet 1 Tab  1 Tab Oral Q6H PRN Luci Lynch MD

## 2021-04-03 NOTE — PROGRESS NOTES
Pharmacy Daily Dosing of Warfarin    Indication: AFib    Goal INR: 2-3    PTA Warfarin Dose: warfarin 2.5 mg daily    Concurrent anticoagulants: NA    Concurrent antiplatelet: aspirin    Major Interacting Medications   Drugs that may increase INR: NA  Drugs that may decrease INR: NA    Conditions that may increase/decrease INR (CHF, C. diff, cirrhosis, thyroid disorder, hypoalbuminemia): NA    Labs:  Recent Labs     04/03/21  0400 04/02/21  0911 04/02/21  0132 04/01/21  0150 04/01/21  0150   INR 1.4*  --  1.5*  --  1.6*   HGB 7.7* 6.1* 6.4*   < >  --      --  265  --   --    ALB  --  3.1*  --   --   --     < > = values in this interval not displayed. Impression/Plan:   Restart with warfarin 5 mg PO x 1 dose after no warfarin x2 days. Daily INR  CBC w/o diff QOD     Pharmacy will follow daily and adjust the dose as appropriate.     Thanks  Kae Casarez, PHARMD

## 2021-04-03 NOTE — PROGRESS NOTES
End of Shift Note    Bedside shift change report given to [address] [facility],  [address], RN (oncoming nurse) by Josh Gardner (offgoing nurse). Report included the following information SBAR, Kardex, Intake/Output, MAR and Recent Results    Shift worked:  7am-7pm     Shift summary and any significant changes:     Pt rested. Received scheduled meds and pain meds. Dressing on left arm was changed per patient request. No other significant changes. Concerns for physician to address:  None. Zone phone for oncoming shift:   [phone]       Activity:  Activity Level: Up ad fouzia  Number times ambulated in hallways past shift: 2  Number of times OOB to chair past shift: 3    Cardiac:   Cardiac Monitoring: Yes      Cardiac Rhythm: Normal sinus rhythm    Access:   Current line(s): PIV and HD access     Genitourinary:   Urinary status: anuric    Respiratory:   O2 Device: Nasal cannula  Chronic home O2 use?: NO  Incentive spirometer at bedside: N/A     GI:  Last Bowel Movement Date: 03/30/21  Current diet:  DIET RENAL Regular  Passing flatus: YES  Tolerating current diet: YES       Pain Management:   Patient states pain is manageable on current regimen: YES    Skin:  Yong Score: 22  Interventions: increase time out of bed    Patient Safety:  Fall Score:  Total Score: 1  Interventions: gripper socks  High Fall Risk: Yes    Length of Stay:  Expected LOS: 4d 19h  Actual LOS: [address]

## 2021-04-03 NOTE — PROGRESS NOTES
Problem: Falls - Risk of  Goal: *Absence of Falls  Description: Document Burrell Canavan Fall Risk and appropriate interventions in the flowsheet.   Outcome: Progressing Towards Goal  Note: Fall Risk Interventions:            Medication Interventions: Patient to call before getting OOB, Teach patient to arise slowly                   Problem: Patient Education: Go to Patient Education Activity  Goal: Patient/Family Education  Outcome: Progressing Towards Goal

## 2021-04-04 LAB
ERYTHROCYTE [DISTWIDTH] IN BLOOD BY AUTOMATED COUNT: 17.2 % (ref 11.5–14.5)
HCT VFR BLD AUTO: 22.5 % (ref 36.6–50.3)
HGB BLD-MCNC: 7.3 G/DL (ref 12.1–17)
INR PPP: 1.5 (ref 0.9–1.1)
MCH RBC QN AUTO: 31.5 PG (ref 26–34)
MCHC RBC AUTO-ENTMCNC: 32.4 G/DL (ref 30–36.5)
MCV RBC AUTO: 97 FL (ref 80–99)
NRBC # BLD: 0 K/UL (ref 0–0.01)
NRBC BLD-RTO: 0 PER 100 WBC
PLATELET # BLD AUTO: 258 K/UL (ref 150–400)
PMV BLD AUTO: 10.1 FL (ref 8.9–12.9)
PROTHROMBIN TIME: 15 SEC (ref 9–11.1)
RBC # BLD AUTO: 2.32 M/UL (ref 4.1–5.7)
WBC # BLD AUTO: 17.1 K/UL (ref 4.1–11.1)

## 2021-04-04 PROCEDURE — 94760 N-INVAS EAR/PLS OXIMETRY 1: CPT

## 2021-04-04 PROCEDURE — 77010033678 HC OXYGEN DAILY

## 2021-04-04 PROCEDURE — 74011250636 HC RX REV CODE- 250/636: Performed by: SURGERY

## 2021-04-04 PROCEDURE — 74011250637 HC RX REV CODE- 250/637: Performed by: STUDENT IN AN ORGANIZED HEALTH CARE EDUCATION/TRAINING PROGRAM

## 2021-04-04 PROCEDURE — 80048 BASIC METABOLIC PNL TOTAL CA: CPT

## 2021-04-04 PROCEDURE — 85610 PROTHROMBIN TIME: CPT

## 2021-04-04 PROCEDURE — 65270000029 HC RM PRIVATE

## 2021-04-04 PROCEDURE — 36415 COLL VENOUS BLD VENIPUNCTURE: CPT

## 2021-04-04 PROCEDURE — 85027 COMPLETE CBC AUTOMATED: CPT

## 2021-04-04 RX ORDER — FACIAL-BODY WIPES
10 EACH TOPICAL ONCE
Status: COMPLETED | OUTPATIENT
Start: 2021-04-04 | End: 2021-04-04

## 2021-04-04 RX ORDER — POLYETHYLENE GLYCOL 3350 17 G/17G
17 POWDER, FOR SOLUTION ORAL DAILY
Status: DISCONTINUED | OUTPATIENT
Start: 2021-04-04 | End: 2021-04-04

## 2021-04-04 RX ORDER — WARFARIN SODIUM 5 MG/1
5 TABLET ORAL ONCE
Status: COMPLETED | OUTPATIENT
Start: 2021-04-04 | End: 2021-04-04

## 2021-04-04 RX ORDER — POLYETHYLENE GLYCOL 3350 17 G/17G
17 POWDER, FOR SOLUTION ORAL DAILY
Status: DISCONTINUED | OUTPATIENT
Start: 2021-04-05 | End: 2021-04-06

## 2021-04-04 RX ORDER — BISACODYL 5 MG
5 TABLET, DELAYED RELEASE (ENTERIC COATED) ORAL DAILY
Status: DISCONTINUED | OUTPATIENT
Start: 2021-04-05 | End: 2021-04-10 | Stop reason: HOSPADM

## 2021-04-04 RX ORDER — POLYETHYLENE GLYCOL 3350 17 G/17G
34 POWDER, FOR SOLUTION ORAL DAILY
Status: DISCONTINUED | OUTPATIENT
Start: 2021-04-05 | End: 2021-04-10 | Stop reason: HOSPADM

## 2021-04-04 RX ADMIN — POLYETHYLENE GLYCOL 3350 17 G: 17 POWDER, FOR SOLUTION ORAL at 12:17

## 2021-04-04 RX ADMIN — POLYETHYLENE GLYCOL 3350 17 G: 17 POWDER, FOR SOLUTION ORAL at 01:57

## 2021-04-04 RX ADMIN — PANTOPRAZOLE SODIUM 40 MG: 40 TABLET, DELAYED RELEASE ORAL at 09:06

## 2021-04-04 RX ADMIN — MORPHINE SULFATE 2 MG: 2 INJECTION, SOLUTION INTRAMUSCULAR; INTRAVENOUS at 21:52

## 2021-04-04 RX ADMIN — CALCITRIOL CAPSULES 0.25 MCG 0.5 MCG: 0.25 CAPSULE ORAL at 09:04

## 2021-04-04 RX ADMIN — WARFARIN SODIUM 5 MG: 5 TABLET ORAL at 17:29

## 2021-04-04 RX ADMIN — MORPHINE SULFATE 2 MG: 2 INJECTION, SOLUTION INTRAMUSCULAR; INTRAVENOUS at 16:17

## 2021-04-04 RX ADMIN — CALCIUM ACETATE 1334 MG: 667 CAPSULE ORAL at 12:18

## 2021-04-04 RX ADMIN — ASPIRIN 81 MG: 81 TABLET, CHEWABLE ORAL at 09:04

## 2021-04-04 RX ADMIN — BISACODYL 10 MG: 10 SUPPOSITORY RECTAL at 12:18

## 2021-04-04 RX ADMIN — METOPROLOL SUCCINATE 12.5 MG: 25 TABLET, EXTENDED RELEASE ORAL at 09:05

## 2021-04-04 RX ADMIN — MORPHINE SULFATE 2 MG: 2 INJECTION, SOLUTION INTRAMUSCULAR; INTRAVENOUS at 04:51

## 2021-04-04 RX ADMIN — Medication 10 ML: at 21:49

## 2021-04-04 RX ADMIN — CALCIUM ACETATE 1334 MG: 667 CAPSULE ORAL at 09:04

## 2021-04-04 RX ADMIN — Medication 10 ML: at 13:32

## 2021-04-04 RX ADMIN — MORPHINE SULFATE 2 MG: 2 INJECTION, SOLUTION INTRAMUSCULAR; INTRAVENOUS at 09:06

## 2021-04-04 RX ADMIN — ATORVASTATIN CALCIUM 20 MG: 20 TABLET, FILM COATED ORAL at 09:04

## 2021-04-04 RX ADMIN — CALCIUM ACETATE 1334 MG: 667 CAPSULE ORAL at 16:21

## 2021-04-04 NOTE — PROGRESS NOTES
.      Hospitalist Progress Note    NAME: Lala Kenyon   :  1975   MRN:  [id number]       Assessment / Plan:  Gram-positive bacteremia (methicillin-resistant staph epidermidis) in 4 bottles 3/27  Had bacteremia in 2020, had femoral dialysis catheter for 2 months at the time (was tunneled catheter)  History of endocarditis  Transthoracic echocardiography with mobile structure in the left ventricle, ejection fraction is 30%mitral regurg  Coronary artery disease   ischemic cardiomyopathy with AICD  CT scan of the lumbar spine is not showing signs of osteomyelitis  Vascular surgery consulted mild fluid around AV graft. Planning exploration tomorrow . Cardiology were consulted and they are looking into the old ICD and leads. EVER 3/30 no signs of endocarditis  3/30 cultures still with 1/2 growing GPC. Culture repeat ordered  discussed case with infection disease Dr. Wilbur Mckinney     going for graft removal today. discussed with Dr. Kasey Ibrahim for culture of blood after graft removal. Will need permicath insertion (after documenting bacteremia clearance)  Hgb less than 7 (?dialuted sample) was ordered a unit of blood. No signs of bleeding. HH and culture post OP were sent  Operative report with no suspicion of infection. There was complication with some clotting intraoperatively \"   AVG was so incorporated that a graftotomy was made. The graft was clamped and this was repaired w/ a single suture but the graft clotted. It was difficult to declot the graft w/o heparin. Angiomax was used but this was not clearly effective as he kept clotting. AVG was angioplastied w/ a 7x10 balloon. Good flow on completion. Greater than average EBL due to need for repeated thrombectomy. \" received a unit of blood pre and one post operatively. Perigraft fluids sent for cultures will follow    4/3 no events   no events, leukocytic count elevated today, ?   Postoperative demargination, will follow    Constipation  Add regular bisacodyl and miralax    End-stage renal disease  Nephrology following          Hypertension   Continue metoprolol      History of DVT and graft thrombosis on warfarin  History of HIT  Continue warfarin    ICMP EF 30-35% s/p ICD placement  continue aspirin, statin and bblocker      Hyperlipidemia  Continue statin    GERD  Continue pantoprazole    Insomnia  PRN melatonin    Gout      18.5 - 24.9 Normal weight / Body mass index is 22.58 kg/m². Code status: Full  Prophylaxis: Coumadin  Recommended Disposition: Home w/Family     Subjective:     Chief Complaint / Reason for Physician Visit   To follow-up with Mr. Aftab Fatima with gram-positive bacteremia (recurrent) with history ESRD dialysis via left AV graft, ischemic cardiopathy s/p ICD, HLD, gout. discussed with RN events overnight. Review of Systems:  Symptom Y/N Comments  Symptom Y/N Comments   Fever/Chills n   Chest Pain n    Poor Appetite n   Edema     Cough n   Abdominal Pain n    Sputum n   Joint Pain     SOB/ZAMUDIO n   Pruritis/Rash     Nausea/vomit n   Tolerating PT/OT     Diarrhea    Tolerating Diet     Constipation    Other       Could NOT obtain due to:      Objective:     VITALS:   Last 24hrs VS reviewed since prior progress note. Most recent are:  Patient Vitals for the past 24 hrs:   Temp Pulse Resp BP SpO2   04/04/21 1150 98.1 °F (36.7 °C) 94 16 119/79 95 %   04/04/21 0758 98.4 °F (36.9 °C) 95 18 110/76 97 %   04/04/21 0338 97.2 °F (36.2 °C) 91 18 102/71 96 %   04/03/21 2346 98.4 °F (36.9 °C) 92 18 104/69 97 %   04/03/21 2022 97.5 °F (36.4 °C) 87 17 104/67 97 %   04/03/21 1500 97.9 °F (36.6 °C) 86 17 109/66 100 %     No intake or output data in the 24 hours ending 04/04/21 1225     I had a face to face encounter and independently examined this patient on 4/4/2021, as outlined below:  PHYSICAL EXAM:  General: WD, WN. Alert, cooperative, no acute distress    EENT:  EOMI. Anicteric sclerae.  MMM  Resp:  CTA bilaterally, no wheezing or rales. No accessory muscle use  CV:  Regular  rhythm,  No edema  GI:  Soft, Non distended, Non tender. +Bowel sounds  Neurologic:  Alert and oriented X 3, normal speech,   Psych:   Good insight. Not anxious nor agitated  Skin:  No rashes. No jaundice    Reviewed most current lab test results and cultures  YES  Reviewed most current radiology test results   YES  Review and summation of old records today    NO  Reviewed patient's current orders and MAR    YES  PMH/SH reviewed - no change compared to H&P  ________________________________________________________________________  Care Plan discussed with:    Comments   Patient x    Family      RN x    Care Manager     Consultant                        Multidiciplinary team rounds were held today with , nursing, pharmacist and clinical coordinator. Patient's plan of care was discussed; medications were reviewed and discharge planning was addressed. ________________________________________________________________________  Total NON critical care TIME:  23  Minutes    Total CRITICAL CARE TIME Spent:   Minutes non procedure based      Comments   >50% of visit spent in counseling and coordination of care x    ________________________________________________________________________  Maulik Metcalf MD     Procedures: see electronic medical records for all procedures/Xrays and details which were not copied into this note but were reviewed prior to creation of Plan. LABS:  I reviewed today's most current labs and imaging studies.   Pertinent labs include:  Recent Labs     04/04/21  0429 04/03/21  0400 04/02/21  0911 04/02/21  0132   WBC 17.1* 13.2*  --  15.4*   HGB 7.3* 7.7* 6.1* 6.4*   HCT 22.5* 23.7* 18.8* 19.6*    243  --  265     Recent Labs     04/04/21  0429 04/03/21  0400 04/02/21  0911 04/02/21  0132   NA  --  133* 131* 129*   K  --  4.9 4.6 5.1   CL  --  99 93* 92*   CO2  --  27 28 26   GLU  --  118* 87 92 BUN  --  23* 29* 32*   CREA  --  5.49* 6.61* 7.61*   CA  --  9.0 9.0 9.2   MG  --  2.2  --   --    PHOS  --  4.0 3.4  --    ALB  --   --  3.1*  --    INR 1.5* 1.4*  --  1.5*       Signed: Eugenio Rodriguez MD

## 2021-04-04 NOTE — PROGRESS NOTES
Pharmacy Daily Dosing of Warfarin    Indication: AFib    Goal INR: 2-3    PTA Warfarin Dose: warfarin 2.5 mg daily    Concurrent anticoagulants: NA    Concurrent antiplatelet: aspirin    Major Interacting Medications   Drugs that may increase INR: NA  Drugs that may decrease INR: NA    Conditions that may increase/decrease INR (CHF, C. diff, cirrhosis, thyroid disorder, hypoalbuminemia): NA    Labs:  Recent Labs     04/04/21  0429 04/03/21  0400 04/02/21  0911 04/02/21  0132   INR 1.5* 1.4*  --  1.5*   HGB 7.3* 7.7* 6.1* 6.4*    243  --  265   ALB  --   --  3.1*  --      Impression/Plan:   Repeat warfarin 5 mg PO x 1 dose  Daily INR  CBC w/o diff QOD     Pharmacy will follow daily and adjust the dose as appropriate.     Thanks  Beth Morin, PHARMD

## 2021-04-04 NOTE — PROGRESS NOTES
Cardiology Progress Note  4/4/2021     Admit Date: 3/29/2021  Admit Diagnosis: Gram-positive bacteremia [R78.81]  Leucocytosis [D72.829]  ESRD (end stage renal disease) on dialysis ([facility] [address] [N18.6, Z99.2]  CC: none currently  Cardiac Assessment/Plan (per Dr Manjeet Cho):   Usual cardiologist:  Margie Agrawal MD     Assessment: 1. 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 EVER negative for overt endocarditis. BCX positive from 3/27, 3/29, 3/30. Negative 3/30, 4/1. Now on vancomycin (methicillin resistant). 2. Remote SJM single chamber ICD implant (dual coil shock lead) in 2009 with gen change in 2016 due to battery depletion, subsequent diagnosis of lead fracture, system turned OFF, subcutaneous [employer] ICD implanted at [address] [place] [address]. No evidence of S-ICD pocket infection. 3. Ischemic cardiomyopathy, primary prevention indication for ICD implant. 4. CAD with anterior MI and remote stenting in 2007. Chronic aspirin therapy. 5. Chronic systolic CHF. 6. Remote history of endocarditis. 7. Remote history of IVC filter due to lower extremity DVT. Chronic anticoagulation with warfarin. 8. ESRD with dialysis. Remote renal transplant x 2 due to renal failure with nephrotic syndrome. Failed transplants, chronic hemodialysis with history of access issues, using LUE AV graft currently. 9. History of heparin associated thrombocytopenia. 10. Anemia due to chronic renal disease. Hgb 6.1 on 4/2. Normal platelets. 11. Iodine contrast reaction (per notes). 12. Full code.     Plan:      1. Endovascular SJM ICD extraction is reasonable to consider but not without substantial risk--this would have to be done outside of [facility]. Would be great if the subcutaneous ICD could stay in place, but that could come out too if needed at lower risk. .  2. He is not pacemaker-dependent nor has he received therapy for VT-VF, this is a primary prevention device implant.     I discussed the issues with the patient, he's on board with whatever is recommended. Due to CT surgery backup issues, would be about 2-3 weeks out from a potential endovascular system extraction I am told.     It seems that he's gotten his latest cardiology care at [facility], so can contact them regarding appropriateness for extraction but first he needs to undergo his left upper extremity AV graft surgery. The number for [facility] EP is [phone], choose option 5 for physician to physician communication.     Dr. Torrey Eckert will first do exploration and probably AV graft removal on 4/2.     I will be OFF starting tomorrow through next week. Dr. Tyrel Sheehan will see the patient this weekend, Dr. Rosmery Sanchez will be back on Monday. 4/3: No CP/dyspnea. BP ; HR 70-80s; 100% RA. WBC 13.2; Hg 7.7 from 6.1; Cr 5.5. Na 133. INR 1.4. Cardiac meds: asa; lipitor 20; toprol Xl 12.5; coumadin. No new cardiac recs; plans as above. 4/4: BP ; HR 80-90s; NSR; prob brief NSVT (lots of artifact). 95% RA. WBC 17; Hg 7.3;     Cardiac meds remain: asa; lipitor 20; toprol Xl 12.5; coumadin. No new cardiac recs; plans as above. For other plans, see orders.   Hospital problem list     Active Hospital Problems    Diagnosis Date Noted    Leucocytosis 03/29/2021    Gram-positive bacteremia 03/29/2021    ESRD (end stage renal disease) on dialysis ([facility] [address] 05/14/2013        Subjective: Sabrina Arrington reports       Chest pain X none  consistent with:  Non-cardiac CP         Atypical CP     None now  On going  Anginal CP     Dyspnea X none  at rest  with exertion         improved  unchanged  worse              PND X none  overnight       Orthopnea X none  improved  unchanged  worse   Presyncope X none  improved  unchanged  worse     Ambulated in hallway without symptoms   Yes   Ambulated in room without symptoms  Yes   Objective:     Physical Exam:  Overall VSSAF;    Visit Vitals  /79   Pulse 94   Temp 98.1 °F (36.7 °C)   Resp 16   Ht 5' 7\" (1.702 m)   Wt 65.4 kg (144 lb 2.9 oz)   SpO2 95%   BMI 22.58 kg/m²     Temp (24hrs), Av.9 °F (36.6 °C), Min:97.2 °F (36.2 °C), Max:98.4 °F (36.9 °C)    Patient Vitals for the past 8 hrs:   Pulse   21 1150 94   21 0758 95     Patient Vitals for the past 8 hrs:   Resp   21 1150 16   21 0758 18     Patient Vitals for the past 8 hrs:   BP   21 1150 119/79   21 0758 110/76     No intake or output data in the 24 hours ending 21 1314  General Appearance: Well developed, well nourished, no acute distress. Ears/Nose/Mouth/Throat:   Normal MM; anicteric. JVP: WNL   Resp:   Few basal crackles. Nl resp effort. Cardiovascular:  RRR, S1, S2 normal, no new murmur. No gallop or rub. Abdomen:   Soft, non-tender, bowel sounds are present. Extremities: Mild edema bilaterally. Skin:  Neuro: Warm and dry. A/O x3, grossly nonfocal       cath site intact w/o hematoma or new bruit; distal pulse unchanged  Yes   Data Review:     Telemetry independently reviewed x sinus  chronic afib  parox afib  NSVT     ECG independently reviewed  NSR  afib  no significant changes  NSST-Tw chgs     x no new ECG provided for review   Lab results reviewed as noted below. Current medications reviewed as noted below. No results for input(s): PH, PCO2, PO2 in the last 72 hours. No results for input(s): CPK, CKMB, CKNDX, TROIQ in the last 72 hours.   Recent Labs     21  0429 21  0400 21  0911 21  0132   NA  --  133* 131* 129*   K  --  4.9 4.6 5.1   CL  --  99 93* 92*   CO2  --  27 28 26   BUN  --  23* 29* 32*   CREA  --  5.49* 6.61* 7.61*   GFRAA  --  14* 11* 10*   GLU  --  118* 87 92   PHOS  --  4.0 3.4  --    CA  --  9.0 9.0 9.2   ALB  --   --  3.1*  --    WBC 17.1* 13.2*  --  15.4*   HGB 7.3* 7.7* 6.1* 6.4*   HCT 22.5* 23.7* 18.8* 19.6*    243  --  265     Lab Results   Component Value Date/Time    Cholesterol, total 139 2012 05:00 PM    HDL Cholesterol 32 2012 05:00 PM LDL, calculated 75.8 05/22/2012 05:00 PM    Triglyceride 156 (H) 05/22/2012 05:00 PM    CHOL/HDL Ratio 4.3 05/22/2012 05:00 PM     Recent Labs     04/02/21  0911   ALB 3.1*     Recent Labs     04/04/21  0429 04/03/21  0400 04/02/21  0132   INR 1.5* 1.4* 1.5*   PTP 15.0* 14.0* 15.0*      No components found for: GLPOC    Current Facility-Administered Medications   Medication Dose Route Frequency    warfarin (COUMADIN) tablet 5 mg  5 mg Oral ONCE    [START ON 4/5/2021] bisacodyL (DULCOLAX) tablet 5 mg  5 mg Oral DAILY    [START ON 4/5/2021] polyethylene glycol (MIRALAX) packet 17 g  17 g Oral DAILY    [START ON 4/5/2021] polyethylene glycol (MIRALAX) packet 34 g  34 g Oral DAILY    [START ON 4/5/2021] Vancomycin random level 4/5, 0400.  thanks   Other ONCE    0.9% sodium chloride infusion 250 mL  250 mL IntraVENous PRN    0.9% sodium chloride infusion 250 mL  250 mL IntraVENous PRN    0.9% sodium chloride infusion 250 mL  250 mL IntraVENous PRN    morphine injection 2 mg  2 mg IntraVENous Q3H PRN    melatonin tablet 3 mg  3 mg Oral QHS PRN    WARFARIN INFORMATION NOTE (COUMADIN)   Other QPM    Vancomycin - Pharmacy to dose   Other Rx Dosing/Monitoring    atorvastatin (LIPITOR) tablet 20 mg  20 mg Oral DAILY    aspirin chewable tablet 81 mg  81 mg Oral DAILY    metoprolol succinate (TOPROL-XL) XL tablet 12.5 mg  12.5 mg Oral DAILY    pantoprazole (PROTONIX) tablet 40 mg  40 mg Oral ACB    calcitRIOL (ROCALTROL) capsule 0.5 mcg  0.5 mcg Oral DAILY    sodium chloride (NS) flush 5-40 mL  5-40 mL IntraVENous Q8H    sodium chloride (NS) flush 5-40 mL  5-40 mL IntraVENous PRN    acetaminophen (TYLENOL) tablet 650 mg  650 mg Oral Q6H PRN    Or    acetaminophen (TYLENOL) suppository 650 mg  650 mg Rectal Q6H PRN    ondansetron (ZOFRAN ODT) tablet 4 mg  4 mg Oral Q8H PRN    Or    ondansetron (ZOFRAN) injection 4 mg  4 mg IntraVENous Q6H PRN    calcium acetate(phosphat bind) (PHOSLO) capsule 1,334 mg  2 Cap Oral TID WITH MEALS    epoetin emilie-epbx (RETACRIT) 12,000 Units combo injection  12,000 Units SubCUTAneous Q MON, WED & FRI    oxyCODONE-acetaminophen (PERCOCET) 5-325 mg per tablet 1 Tab  1 Tab Oral Q6H PRN        Roland Guallpa MD

## 2021-04-04 NOTE — PROGRESS NOTES
Spiritual Care Assessment/Progress Note  [facility]      NAME: Patricio Luna      MRN: [id number]  AGE: 39 y.o.  SEX: male  Jain Affiliation: Islam   Language: English     4/4/2021     Total Time (in minutes): 8     Spiritual Assessment begun in [facility] 2 GENERAL SURGERY through conversation with:         [x]Patient        [] Family    [] Friend(s)        Reason for Consult: Initial/Spiritual assessment, patient floor     Spiritual beliefs: (Please include comment if needed)     [x] Identifies with a brown tradition:         [] Supported by a brown community:            [] Claims no spiritual orientation:           [] Seeking spiritual identity:                [] Adheres to an individual form of spirituality:           [] Not able to assess:                           Identified resources for coping:      [x] Prayer                               [] Music                  [] Guided Imagery     [x] Family/friends                 [] Pet visits     [] Devotional reading                         [] Unknown     [] Other:                                               Interventions offered during this visit: (See comments for more details)    Patient Interventions: Affirmation of emotions/emotional suffering, Catharsis/review of pertinent events in supportive environment, Coping skills reviewed/reinforced, Initial/Spiritual assessment, patient floor, Life review/legacy, Normalization of emotional/spiritual concerns, Prayer (assurance of)           Plan of Care:     [] Support spiritual and/or cultural needs    [] Support AMD and/or advance care planning process      [] Support grieving process   [] Coordinate Rites and/or Rituals    [] Coordination with community clergy   [] No spiritual needs identified at this time   [] Detailed Plan of Care below (See Comments)  [] Make referral to Music Therapy  [] Make referral to Pet Therapy     [] Make referral to Addiction services  [] Make referral to [facility] Passages  [] Make referral to Spiritual Care Partner  [] No future visits requested        [x] Follow up upon further referrals     Comments: Provided initial spiritual assessment for pt Morris Just on GEN SURG. Pt affirms strong family support from sister who has visited and calls in. Processed LOS in relation to long history of hospitalizations. Affirmed perception of good care and improvement with antibiotics. Pt had past heart attack and fears possible infection in pacemakers. Processed coping mechanisms and assured of prayers. Advised of  services.     Sanam 1 EDGAR Oscar 1 Provider   Paging Service [phone])

## 2021-04-04 NOTE — PROGRESS NOTES
End of Shift Note    Bedside shift change report given to MANUELITO Milton (oncoming nurse) by Anaya Cason RN (offgoing nurse). Report included the following information SBAR, Kardex, ED Summary, Intake/Output, MAR, Recent Results and Cardiac Rhythm nsr    Shift worked:  7 am - 7 pm     Shift summary and any significant changes:     uneventfuly, the patient rested in his room, passed a bowel movement after a suppository was given, dressing changed per patient request sutures removed per patient request with approval from MD Halima Stevenson, treated twice for pain     Concerns for physician to address:  none     Zone phone for oncoming shift:   [phone]       Activity:  Activity Level: Up ad fouzia  Number times ambulated in hallways past shift: 4  Number of times OOB to chair past shift: 2    Cardiac:   Cardiac Monitoring: Yes      Cardiac Rhythm: Normal sinus rhythm    Access:   Current line(s): PIV and HD access     Genitourinary:   Urinary status: anuric    Respiratory:   O2 Device: Room air  Chronic home O2 use?: N/A  Incentive spirometer at bedside: YES     GI:  Last Bowel Movement Date: 04/04/21  Current diet:  DIET RENAL Regular  DIET ONE TIME MESSAGE  Passing flatus: YES  Tolerating current diet: YES  % Diet Eaten: 100 %    Pain Management:   Patient states pain is manageable on current regimen: YES    Skin:  Yong Score: 22  Interventions: increase time out of bed and limit briefs    Patient Safety:  Fall Score:  Total Score: 1  Interventions: gripper socks, pt to call before getting OOB and stay with me (per policy)  High Fall Risk: Yes    Length of Stay:  Expected LOS: 4d 19h  Actual LOS: 227 Nitin Buchanan, RN

## 2021-04-04 NOTE — PROGRESS NOTES
Vascular    Afebrile  WBC up to 17  Operative Cx's no growth so far  Cont current care  Awaiting final Cx's  OK to use AVG

## 2021-04-05 LAB
ANION GAP SERPL CALC-SCNC: 11 MMOL/L (ref 5–15)
ANION GAP SERPL CALC-SCNC: 15 MMOL/L (ref 5–15)
BACTERIA SPEC CULT: NORMAL
BASOPHILS # BLD: 0.1 K/UL (ref 0–0.1)
BASOPHILS NFR BLD: 1 % (ref 0–1)
BUN SERPL-MCNC: 48 MG/DL (ref 6–20)
BUN SERPL-MCNC: 53 MG/DL (ref 6–20)
BUN/CREAT SERPL: 6 (ref 12–20)
BUN/CREAT SERPL: 6 (ref 12–20)
CALCIUM SERPL-MCNC: 8.9 MG/DL (ref 8.5–10.1)
CALCIUM SERPL-MCNC: 9.2 MG/DL (ref 8.5–10.1)
CHLORIDE SERPL-SCNC: 93 MMOL/L (ref 97–108)
CHLORIDE SERPL-SCNC: 95 MMOL/L (ref 97–108)
CO2 SERPL-SCNC: 21 MMOL/L (ref 21–32)
CO2 SERPL-SCNC: 22 MMOL/L (ref 21–32)
CREAT SERPL-MCNC: 8.4 MG/DL (ref 0.7–1.3)
CREAT SERPL-MCNC: 8.8 MG/DL (ref 0.7–1.3)
DIFFERENTIAL METHOD BLD: ABNORMAL
EOSINOPHIL # BLD: 0.6 K/UL (ref 0–0.4)
EOSINOPHIL NFR BLD: 3 % (ref 0–7)
ERYTHROCYTE [DISTWIDTH] IN BLOOD BY AUTOMATED COUNT: 17 % (ref 11.5–14.5)
GLUCOSE SERPL-MCNC: 82 MG/DL (ref 65–100)
GLUCOSE SERPL-MCNC: 86 MG/DL (ref 65–100)
HCT VFR BLD AUTO: 22.2 % (ref 36.6–50.3)
HGB BLD-MCNC: 7 G/DL (ref 12.1–17)
HISTORY CHECKED?,CKHIST: NORMAL
IMM GRANULOCYTES # BLD AUTO: 0.2 K/UL (ref 0–0.04)
IMM GRANULOCYTES NFR BLD AUTO: 1 % (ref 0–0.5)
INR PPP: 1.7 (ref 0.9–1.1)
LYMPHOCYTES # BLD: 1.9 K/UL (ref 0.8–3.5)
LYMPHOCYTES NFR BLD: 11 % (ref 12–49)
MAGNESIUM SERPL-MCNC: 2.3 MG/DL (ref 1.6–2.4)
MCH RBC QN AUTO: 31.1 PG (ref 26–34)
MCHC RBC AUTO-ENTMCNC: 31.5 G/DL (ref 30–36.5)
MCV RBC AUTO: 98.7 FL (ref 80–99)
MONOCYTES # BLD: 1.9 K/UL (ref 0–1)
MONOCYTES NFR BLD: 10 % (ref 5–13)
NEUTS SEG # BLD: 13.2 K/UL (ref 1.8–8)
NEUTS SEG NFR BLD: 74 % (ref 32–75)
NRBC # BLD: 0 K/UL (ref 0–0.01)
NRBC BLD-RTO: 0 PER 100 WBC
PHOSPHATE SERPL-MCNC: 5.1 MG/DL (ref 2.6–4.7)
PLATELET # BLD AUTO: 244 K/UL (ref 150–400)
PMV BLD AUTO: 10.5 FL (ref 8.9–12.9)
POTASSIUM SERPL-SCNC: 6.7 MMOL/L (ref 3.5–5.1)
POTASSIUM SERPL-SCNC: 7.1 MMOL/L (ref 3.5–5.1)
PROTHROMBIN TIME: 17.1 SEC (ref 9–11.1)
RBC # BLD AUTO: 2.25 M/UL (ref 4.1–5.7)
SERVICE CMNT-IMP: NORMAL
SODIUM SERPL-SCNC: 128 MMOL/L (ref 136–145)
SODIUM SERPL-SCNC: 129 MMOL/L (ref 136–145)
VANCOMYCIN SERPL-MCNC: 16.5 UG/ML
WBC # BLD AUTO: 17.9 K/UL (ref 4.1–11.1)

## 2021-04-05 PROCEDURE — 74011250637 HC RX REV CODE- 250/637: Performed by: STUDENT IN AN ORGANIZED HEALTH CARE EDUCATION/TRAINING PROGRAM

## 2021-04-05 PROCEDURE — 74011250636 HC RX REV CODE- 250/636: Performed by: INTERNAL MEDICINE

## 2021-04-05 PROCEDURE — 80202 ASSAY OF VANCOMYCIN: CPT

## 2021-04-05 PROCEDURE — 65270000029 HC RM PRIVATE

## 2021-04-05 PROCEDURE — 74011250636 HC RX REV CODE- 250/636: Performed by: SURGERY

## 2021-04-05 PROCEDURE — 99233 SBSQ HOSP IP/OBS HIGH 50: CPT | Performed by: STUDENT IN AN ORGANIZED HEALTH CARE EDUCATION/TRAINING PROGRAM

## 2021-04-05 PROCEDURE — 85025 COMPLETE CBC W/AUTO DIFF WBC: CPT

## 2021-04-05 PROCEDURE — 94760 N-INVAS EAR/PLS OXIMETRY 1: CPT

## 2021-04-05 PROCEDURE — 74011250636 HC RX REV CODE- 250/636: Performed by: EMERGENCY MEDICINE

## 2021-04-05 PROCEDURE — 74011250636 HC RX REV CODE- 250/636: Performed by: STUDENT IN AN ORGANIZED HEALTH CARE EDUCATION/TRAINING PROGRAM

## 2021-04-05 PROCEDURE — 85610 PROTHROMBIN TIME: CPT

## 2021-04-05 PROCEDURE — 77010033678 HC OXYGEN DAILY

## 2021-04-05 PROCEDURE — P9016 RBC LEUKOCYTES REDUCED: HCPCS

## 2021-04-05 PROCEDURE — 36415 COLL VENOUS BLD VENIPUNCTURE: CPT

## 2021-04-05 PROCEDURE — 80048 BASIC METABOLIC PNL TOTAL CA: CPT

## 2021-04-05 PROCEDURE — 84100 ASSAY OF PHOSPHORUS: CPT

## 2021-04-05 PROCEDURE — 90935 HEMODIALYSIS ONE EVALUATION: CPT

## 2021-04-05 PROCEDURE — 83735 ASSAY OF MAGNESIUM: CPT

## 2021-04-05 RX ORDER — SODIUM CHLORIDE 9 MG/ML
250 INJECTION, SOLUTION INTRAVENOUS AS NEEDED
Status: DISCONTINUED | OUTPATIENT
Start: 2021-04-05 | End: 2021-04-10 | Stop reason: HOSPADM

## 2021-04-05 RX ADMIN — PANTOPRAZOLE SODIUM 40 MG: 40 TABLET, DELAYED RELEASE ORAL at 06:34

## 2021-04-05 RX ADMIN — METOPROLOL SUCCINATE 12.5 MG: 25 TABLET, EXTENDED RELEASE ORAL at 14:33

## 2021-04-05 RX ADMIN — EPOETIN ALFA-EPBX 12000 UNITS: 10000 INJECTION, SOLUTION INTRAVENOUS; SUBCUTANEOUS at 21:42

## 2021-04-05 RX ADMIN — WARFARIN SODIUM 3 MG: 1 TABLET ORAL at 18:19

## 2021-04-05 RX ADMIN — ATORVASTATIN CALCIUM 20 MG: 20 TABLET, FILM COATED ORAL at 14:32

## 2021-04-05 RX ADMIN — Medication 10 ML: at 21:44

## 2021-04-05 RX ADMIN — Medication 3 MG: at 00:03

## 2021-04-05 RX ADMIN — ACETAMINOPHEN 650 MG: 325 TABLET, FILM COATED ORAL at 16:39

## 2021-04-05 RX ADMIN — CALCIUM ACETATE 1334 MG: 667 CAPSULE ORAL at 16:39

## 2021-04-05 RX ADMIN — ASPIRIN 81 MG: 81 TABLET, CHEWABLE ORAL at 14:32

## 2021-04-05 RX ADMIN — ONDANSETRON 4 MG: 2 INJECTION INTRAMUSCULAR; INTRAVENOUS at 05:13

## 2021-04-05 RX ADMIN — Medication 10 ML: at 16:33

## 2021-04-05 RX ADMIN — VANCOMYCIN HYDROCHLORIDE 750 MG: 750 INJECTION, POWDER, LYOPHILIZED, FOR SOLUTION INTRAVENOUS at 13:26

## 2021-04-05 RX ADMIN — MORPHINE SULFATE 2 MG: 2 INJECTION, SOLUTION INTRAMUSCULAR; INTRAVENOUS at 07:27

## 2021-04-05 NOTE — PROGRESS NOTES
+.      Hospitalist Progress Note    NAME: Nigel Burnette   :  1975   MRN:  [id number]       Assessment / Plan:  Gram-positive bacteremia (methicillin-resistant staph epidermidis) in 4 bottles 3/27  Had bacteremia in 2020, had femoral dialysis catheter for 2 months at the time (was tunneled catheter)  History of endocarditis  Transthoracic echocardiography with mobile structure in the left ventricle, ejection fraction is 30%mitral regurg  Coronary artery disease   ischemic cardiomyopathy with AICD  CT scan of the lumbar spine is not showing signs of osteomyelitis  Vascular surgery consulted mild fluid around AV graft. Planning exploration tomorrow . Cardiology were consulted and they are looking into the old ICD and leads. EVER 3/30 no signs of endocarditis  3/30 cultures still with 1/2 growing GPC. Culture repeat ordered  discussed case with infection disease Dr. Miguelito Santana     going for graft removal today. discussed with Dr. Ritesh Meneses for culture of blood after graft removal. Will need permicath insertion (after documenting bacteremia clearance)  Hgb less than 7 (?dialuted sample) was ordered a unit of blood. No signs of bleeding. HH and culture post OP were sent  Operative report with no suspicion of infection. There was complication with some clotting intraoperatively \"   AVG was so incorporated that a graftotomy was made. The graft was clamped and this was repaired w/ a single suture but the graft clotted. It was difficult to declot the graft w/o heparin. Angiomax was used but this was not clearly effective as he kept clotting. AVG was angioplastied w/ a 7x10 balloon. Good flow on completion. Greater than average EBL due to need for repeated thrombectomy. \" received a unit of blood pre and one post operatively. Perigraft fluids sent for cultures will follow    4/3 no events   no events, leukocytic count elevated today, ?   Postoperative demargination, will follow     hyperkalemic today undergoing dialysis will receive 1 unit of packed RBCs as hemoglobin 7.1 only. Patient was arranged to be switched to [address] [place] [address] cardiology [address] I have communicated with [facility] but fortunately right now they are at capacity have discussed this case with Dr. July Holman and possibly continuing with the suppressive IV vancomycin until the patient might be able to be admitted at [address] [place] [address] electively for the ICD removal which he thinks is a reasonable option I have discussed that with the case management so that infusion of antibiotics would be arranged and outpatient follow-up with Dr. Julianna Brown soon after discharge will be ensured. Patient is also lethargic today and I am going to discontinue the IV morphine that he had been receiving since the vascular surgery exploration, will be discontinuing Percocet as well. Patient continues to complain of inability to sleep during the night and always appears drowsy during the day. Constipation  Add regular bisacodyl and miralax    End-stage renal disease  Nephrology following          Hypertension   Continue metoprolol      History of DVT and graft thrombosis on warfarin  History of HIT  Continue warfarin    ICMP EF 30-35% s/p ICD placement  continue aspirin, statin and bblocker      Hyperlipidemia  Continue statin    GERD  Continue pantoprazole    Insomnia  PRN melatonin    Gout      18.5 - 24.9 Normal weight / Body mass index is 23.65 kg/m². Code status: Full  Prophylaxis: Coumadin  Recommended Disposition: Home w/Family     Subjective:     Chief Complaint / Reason for Physician Visit   To follow-up with Mr. Pennie Reno with gram-positive bacteremia (recurrent) with history ESRD dialysis via left AV graft, ischemic cardiopathy s/p ICD, HLD, gout. Today he is very drowsy and barely able to be aroused  for me to update him about his condition and efforts for transfer. Discussed with RN events overnight.      Review of Systems:  Symptom Y/N Comments  Symptom Y/N Comments   Fever/Chills    Chest Pain     Poor Appetite    Edema     Cough    Abdominal Pain     Sputum    Joint Pain     SOB/ZAMUDIO    Pruritis/Rash     Nausea/vomit    Tolerating PT/OT     Diarrhea    Tolerating Diet     Constipation    Other       Could NOT obtain due to: Too drowsy     Objective:     VITALS:   Last 24hrs VS reviewed since prior progress note. Most recent are:  Patient Vitals for the past 24 hrs:   Temp Pulse Resp BP SpO2   04/05/21 1240 98.5 °F (36.9 °C) 82 20 124/80 99 %   04/05/21 1143 98.2 °F (36.8 °C) 79 18 124/71    04/05/21 1130  81 20 118/68    04/05/21 1115  82 18 114/78    04/05/21 1100 98.5 °F (36.9 °C) 79 20 116/66    04/05/21 1045  84 18 119/73    04/05/21 1030  84 18 113/70    04/05/21 1015 98.3 °F (36.8 °C) 87 18 111/73    04/05/21 1000  85 18 112/68    04/05/21 0945 98.5 °F (36.9 °C) 88 20 99/63    04/05/21 0930 98.3 °F (36.8 °C) 92 18 101/65    04/05/21 0916  (!) 106 18  100 %   04/05/21 0915 98.5 °F (36.9 °C)       04/05/21 0913  99      04/05/21 0900  98 18 (!) 123/59 100 %   04/05/21 0845  89 18 104/63 98 %   04/05/21 0830  95 20 104/63    04/05/21 0815  95 20 119/67 97 %   04/05/21 0806 98 °F (36.7 °C) 85 20 115/63 99 %   04/05/21 0347 97.4 °F (36.3 °C) 99 16 115/80 97 %   04/04/21 2300 98.2 °F (36.8 °C) 98 16 129/79 96 %   04/04/21 2005 97.8 °F (36.6 °C) 97 16 127/82 95 %   04/04/21 1508 97.4 °F (36.3 °C) (!) 102 16 118/78 96 %       Intake/Output Summary (Last 24 hours) at 4/5/2021 1424  Last data filed at 4/5/2021 1143  Gross per 24 hour   Intake 240 ml   Output 3000 ml   Net -2760 ml        I had a face to face encounter and independently examined this patient on 4/5/2021, as outlined below:  PHYSICAL EXAM:  General: WD, WN. Alert, cooperative, no acute distress    EENT:  EOMI. Anicteric sclerae. MMM  Resp:  CTA bilaterally, no wheezing or rales.   No accessory muscle use  CV:  Regular  rhythm,  No edema  GI:  Soft, Non distended, Non tender. +Bowel sounds  Neurologic:  Alert and oriented X 3, normal speech,   Psych:   Good insight. Not anxious nor agitated  Skin:  No rashes. No jaundice    Reviewed most current lab test results and cultures  YES  Reviewed most current radiology test results   YES  Review and summation of old records today    NO  Reviewed patient's current orders and MAR    YES  PMH/SH reviewed - no change compared to H&P  ________________________________________________________________________  Care Plan discussed with:    Comments   Patient X    Family      RN X    Care Manager X    Consultant                       X Multidiciplinary team rounds were held today with , nursing, pharmacist and clinical coordinator. Patient's plan of care was discussed; medications were reviewed and discharge planning was addressed. ________________________________________________________________________  Total NON critical care TIME:  31  Minutes    Total CRITICAL CARE TIME Spent:   Minutes non procedure based      Comments   >50% of visit spent in counseling and coordination of care X    ________________________________________________________________________  Eugenio Rodriguez MD     Procedures: see electronic medical records for all procedures/Xrays and details which were not copied into this note but were reviewed prior to creation of Plan. LABS:  I reviewed today's most current labs and imaging studies.   Pertinent labs include:  Recent Labs     04/05/21  0317 04/04/21  0429 04/03/21  0400   WBC 17.9* 17.1* 13.2*   HGB 7.0* 7.3* 7.7*   HCT 22.2* 22.5* 23.7*    258 243     Recent Labs     04/05/21 0317 04/04/21  2351 04/04/21  0429 04/03/21  0400   * 128*  --  133*   K 7.1* 6.7*  --  4.9   CL 93* 95*  --  99   CO2 21 22  --  27   GLU 82 86  --  118*   BUN 53* 48*  --  23*   CREA 8.80* 8.40*  --  5.49*   CA 8.9 9.2  --  9.0   MG 2.3  --   --  2.2   PHOS 5.1*  --   --  4.0   INR 1.7*  -- 1. 5* 1.4*       Signed: Gerda Hall MD

## 2021-04-05 NOTE — PROGRESS NOTES
Pharmacy Automatic Renal Dosing Protocol - Antimicrobials    Indication for Antimicrobials: Bacteremia (MRSE) Hx of endocarditis     Current Regimen of Each Antimicrobial:  Vancomycin per HD protocol (Start date 3/29; day 8)    Previous Antimicrobial Therapy:    Goal Level: 20-25  (AUC: 400 - 600 mg/hr/Liter/day)     Date Dose & Interval Measured (mcg/mL) Extrapolated (mcg/mL)    500 mg IV post HD 19.1 --    500mg post HD 16.5 --           Date & time of next level:     Significant Cultures:   3/27 bcx - MRSE  (final)  3/29 pbcx - MRSE (final)  3/30 blood1: CONS in 1 of 2. pending  3/30 blood2: NG (final)   blood pair: staph epi in 2 of 4. Pending   wound: NG. Pending.  anaerobe: ngsf (pending)    Radiology / Imaging results: (X-ray, CT scan or MRI): na    Paralysis, amputations, malnutrition: na    Labs:  Recent Labs     21  0317 21  2351 21  0429 21  0400   CREA 8.80* 8.40*  --  5.49*   BUN 53* 48*  --  23*   WBC 17.9*  --  17.1* 13.2*     Temp (24hrs), Av.9 °F (36.6 °C), Min:97.4 °F (36.3 °C), Max:98.5 °F (36.9 °C)    Is the Patient on Dialysis? MWF    Creatinine Clearance (mL/min):   CrCl (Actual Body Weight): 10.5  CrCl (Adjusted Body Weight): 10.3  CrCl (Ideal Body Weight): 10.1    Impression/Plan:   Pre-HD vancomycin level subtherapeutic x 2. Increase post-HD dose of vancomycin to 750mg  BMP MWF  Antimicrobial stop date: TBD. Pharmacy will follow daily and adjust medications as appropriate for renal function and/or serum levels. Thank you,  Toni Alamo PHARMD    Recommended duration of therapy  [email or web address]. [email or web address]    Renal Dosing  [email or web address]/[facility]/Pharmacy/Clinical%20Companion/Renal%[email or web address]. pdf

## 2021-04-05 NOTE — PROCEDURES
[facility]  [phone]    Vitals   Pre   Post   Assessment   Pre   Post     Temp  Temp: 98 °F (36.7 °C) (04/05/21 0806)  98.2 LOC  Alert and oriented Alert and oriented   HR   Pulse (Heart Rate): 85 (04/05/21 0806) 79 Lungs   Labored with exertion, clear to auscultation 2 liters oxygen saturation 100%  unlabored even    B/P   BP: 115/63 (04/05/21 0806) 124/71 Cardiac   Regular monitored remotely   regular monitored remotely    Resp   Resp Rate: 20 (04/05/21 0806) 18 Skin   Warm and dry   warm and dry    Pain level  Pain Intensity 1: (P) 5 (04/05/21 0727) No verbal complaints Edema  BLE     BLE   Orders:    Duration:   Start:    0806 End:   1143 Total:   3.5   Dialyzer:   Dialyzer/Set Up Inspection: Revaclear (04/05/21 0806)   Tammy Maxim Bath:   Dialysate K (mEq/L): 2 (04/05/21 0806)   Ca Bath:   Dialysate CA (mEq/L): 2.5 (04/05/21 0806)   Na/Bicarb:   Dialysate NA (mEq/L): 0140 (04/05/21 0806)   Target Fluid Removal:   Goal/Amount of Fluid to Remove (mL): 3000 mL (04/05/21 0806)   Access     Type & Location:    SUZANNE AV graft, staples intact no redness or drainage, prepped with alcohol and chlorahexadine scrub, cannulated with two 15 gauge needles without incident, flushed easily    Labs     Obtained/Reviewed   Critical Results Called   Date when labs were drawn-  Hgb-    HGB   Date Value Ref Range Status   04/05/2021 7.0 (L) 12.1 - 17.0 g/dL Final     K-    Potassium   Date Value Ref Range Status   04/05/2021 7.1 (HH) 3.5 - 5.1 mmol/L Final     Comment:     RESULTS VERIFIED, PHONED TO AND READ BACK BY  SUMAYA BILLINGS AT 0431/RK       Ca-   Calcium   Date Value Ref Range Status   04/05/2021 8.9 8.5 - 10.1 MG/DL Final     Bun-   BUN   Date Value Ref Range Status   04/05/2021 53 (H) 6 - 20 MG/DL Final     Creat-   Creatinine   Date Value Ref Range Status   04/05/2021 8.80 (H) 0.70 - 1.30 MG/DL Final        Medications/ Blood Products Given     Name   Dose   Route and Time     prbc  One unit Iv via pump             Blood Volume Processed (BVP):    80 Net Fluid   Removed:  3000   Comments   Time Out Done: 0800  Primary Nurse Rpt Pre: Ashley Corona RN  Primary Nurse Rpt Post: Nicole Sharma RN   Pt Education: foods elevated in potassium (K this am 7.1) MD aware patient states he drank orange juice  Care Plan: continue current HD plan of care   Tx Summary:  0806 HD initiated without incident, Dr. Sierra Ríos in to see patient on HD he would like transfusion of one unit prbc today hgb 7.0 patient short of breath. 0915 PRBC checked and initiated as ordered. 1100 transfusion complete no signs or sx of transfusion reaction. 1143 treatment completed, all possible blood returned, hemostasis achieved <20 minutes, dressing clean, dry and intact, + thrill, SBAR to primary nurse. All dialysis related medications have been reviewed. Admiting Diagnosis: bacteremia   Pt's previous clinic- Home therapies through [facility] per patient report   Consent signed - Informed Consent Verified: Yes (04/05/21 0806)  Hepatitis Status- 02/10/21 antigen negative immune   Machine #- Machine Number: [id number] (04/05/21 1026)  Telemetry status- monitored remotely   Pre-dialysis wt. -  68.5 per patient report

## 2021-04-05 NOTE — PROGRESS NOTES
Vascular Surgery Progress Note  [facility]  [facility]       Subjective:     Jhonny Mcginnis is a 39 y.o.  with a hx of ASHD, AICD x2, HTN, HLD, ESRF, Recurrent DVT s/p IVC, GERD, and SBO. He is admitted to the hospital with sepsis secondary to bacteremia. He has a hx of endocarditis and was diagnosed with MRSE bacteremia 11/2020 due to a femoral dialysis catheter. We were to evaluate for AVG infection. He is s/p creation of a RUE bovine AVG (now w/o flow) & LUE [place]-Ryan AVG 9/2020. He underwent exploration of the AVG on 04/02. Surgical cx x2 are pending.       Nursing Data:     Patient Vitals for the past 24 hrs:   BP Temp Temp src Pulse Resp SpO2 Weight   04/05/21 1240 124/80 98.5 °F (36.9 °C)  82 20 99 %    04/05/21 1143 124/71 98.2 °F (36.8 °C) Oral 79 18     04/05/21 1130 118/68   81 20     04/05/21 1115 114/78   82 18     04/05/21 1100 116/66 98.5 °F (36.9 °C)  79 20     04/05/21 1045 119/73   84 18     04/05/21 1030 113/70   84 18     04/05/21 1015 111/73 98.3 °F (36.8 °C)  87 18     04/05/21 1000 112/68   85 18     04/05/21 0945 99/63 98.5 °F (36.9 °C)  88 20     04/05/21 0930 101/65 98.3 °F (36.8 °C)  92 18     04/05/21 0916    (!) 106 18 100 %    04/05/21 0915  98.5 °F (36.9 °C)        04/05/21 0913    99      04/05/21 0900 (!) 123/59   98 18 100 %    04/05/21 0845 104/63   89 18 98 %    04/05/21 0830 104/63   95 20     04/05/21 0815 119/67   95 20 97 %    04/05/21 0806 115/63 98 °F (36.7 °C) Oral 85 20 99 %    04/05/21 0406       68.5 kg (151 lb 0.2 oz)   04/05/21 0347 115/80 97.4 °F (36.3 °C)  99 16 97 %    04/04/21 2300 129/79 98.2 °F (36.8 °C)  98 16 96 %    04/04/21 2005 127/82 97.8 °F (36.6 °C)  97 16 95 %    04/04/21 1508 118/78 97.4 °F (36.3 °C)  (!) 102 16 96 %    04/04/21 1412       67.4 kg (148 lb 9.4 oz)       Intake/Output Summary (Last 24 hours) at 4/5/2021 1313  Last data filed at 4/5/2021 1143  Gross per 24 hour   Intake 240 ml   Output 3000 ml   Net -2760 ml       Exam:     Constititional:     Alert and oriented. He is ambulating independently   HENT:      Head: Normocephalic. Nose: Nose normal.      Mouth/Throat:      Mouth: Mucous membranes are moist.      Moon face   Eyes:      Pupils: Pupils are equal, round, and reactive to light. Neck:      Musculoskeletal: Normal range of motion. Cardiovascular:      Rate and Rhythm: Mildly tachycardic w/ regular rhythm. Comments: LUE AVG with palpable thrill. Dressing is intact. Left foot warm and perfused. Chronic 2+ edema. Pulmonary:      Effort: Pulmonary effort is normal. No respiratory distress. Abdominal:      General: Abdomen is flat. There is no distension. Musculoskeletal: Normal range of motion. Skin:     General: Skin is warm. Neurological:      General: No focal deficit present. Mental Status: He is alert. Mental status is at baseline. Psychiatric:         Mood and Affect: Mood normal.         Behavior: Behavior normal.     Lab Review:     .   Recent Results (from the past 24 hour(s))   METABOLIC PANEL, BASIC    Collection Time: 04/04/21 11:51 PM   Result Value Ref Range    Sodium 128 (L) 136 - 145 mmol/L    Potassium 6.7 (HH) 3.5 - 5.1 mmol/L    Chloride 95 (L) 97 - 108 mmol/L    CO2 22 21 - 32 mmol/L    Anion gap 11 5 - 15 mmol/L    Glucose 86 65 - 100 mg/dL    BUN 48 (H) 6 - 20 MG/DL    Creatinine 8.40 (H) 0.70 - 1.30 MG/DL    BUN/Creatinine ratio 6 (L) 12 - 20      GFR est AA 8 (L) >60 ml/min/1.73m2    GFR est non-AA 7 (L) >60 ml/min/1.73m2    Calcium 9.2 8.5 - 10.1 MG/DL   PROTHROMBIN TIME + INR    Collection Time: 04/05/21  3:17 AM   Result Value Ref Range    INR 1.7 (H) 0.9 - 1.1      Prothrombin time 17.1 (H) 9.0 - 11.1 sec   VANCOMYCIN, RANDOM    Collection Time: 04/05/21  3:17 AM   Result Value Ref Range    Vancomycin, random 22.2 UG/ML   METABOLIC PANEL, BASIC    Collection Time: 04/05/21  3:17 AM   Result Value Ref Range    Sodium 129 (L) 136 - 145 mmol/L    Potassium 7.1 (HH) 3.5 - 5.1 mmol/L    Chloride 93 (L) 97 - 108 mmol/L    CO2 21 21 - 32 mmol/L    Anion gap 15 5 - 15 mmol/L    Glucose 82 65 - 100 mg/dL    BUN 53 (H) 6 - 20 MG/DL    Creatinine 8.80 (H) 0.70 - 1.30 MG/DL    BUN/Creatinine ratio 6 (L) 12 - 20      GFR est AA 8 (L) >60 ml/min/1.73m2    GFR est non-AA 7 (L) >60 ml/min/1.73m2    Calcium 8.9 8.5 - 10.1 MG/DL   MAGNESIUM    Collection Time: 04/05/21  3:17 AM   Result Value Ref Range    Magnesium 2.3 1.6 - 2.4 mg/dL   PHOSPHORUS    Collection Time: 04/05/21  3:17 AM   Result Value Ref Range    Phosphorus 5.1 (H) 2.6 - 4.7 MG/DL   CBC WITH AUTOMATED DIFF    Collection Time: 04/05/21  3:17 AM   Result Value Ref Range    WBC 17.9 (H) 4.1 - 11.1 K/uL    RBC 2.25 (L) 4.10 - 5.70 M/uL    HGB 7.0 (L) 12.1 - 17.0 g/dL    HCT 22.2 (L) 36.6 - 50.3 %    MCV 98.7 80.0 - 99.0 FL    MCH 31.1 26.0 - 34.0 PG    MCHC 31.5 30.0 - 36.5 g/dL    RDW 17.0 (H) 11.5 - 14.5 %    PLATELET 038 068 - 563 K/uL    MPV 10.5 8.9 - 12.9 FL    NRBC 0.0 0  WBC    ABSOLUTE NRBC 0.00 0.00 - 0.01 K/uL    NEUTROPHILS 74 32 - 75 %    LYMPHOCYTES 11 (L) 12 - 49 %    MONOCYTES 10 5 - 13 %    EOSINOPHILS 3 0 - 7 %    BASOPHILS 1 0 - 1 %    IMMATURE GRANULOCYTES 1 (H) 0.0 - 0.5 %    ABS. NEUTROPHILS 13.2 (H) 1.8 - 8.0 K/UL    ABS. LYMPHOCYTES 1.9 0.8 - 3.5 K/UL    ABS. MONOCYTES 1.9 (H) 0.0 - 1.0 K/UL    ABS. EOSINOPHILS 0.6 (H) 0.0 - 0.4 K/UL    ABS. BASOPHILS 0.1 0.0 - 0.1 K/UL    ABS. IMM. GRANS. 0.2 (H) 0.00 - 0.04 K/UL    DF AUTOMATED     RBC, ALLOCATE    Collection Time: 04/05/21  8:45 AM   Result Value Ref Range    HISTORY CHECKED?  Historical check performed           Assessment/Plan:      Sepsis  Persistent MSSA Bacteremia   -blood cx 3/27 STAPHYLOCOCCUS EPIDERMIDIS 4/4  -repeat blood cx 3/29 STAPHYLOCOCCUS EPIDERMIDIS (OXACILLIN RESISTANT) 4/4  -repeat blood cx 3/30 co-ag neg STAPH 1/2 BTL  -repeat blood cx 04/01 STAPHYLOCOCCUS EPIDERMIDIS 2/4 BTL   Hx of Endocarditis    -\"EVER neg for overt endocarditis\"  -Hx of MRSE bacteremia 11/2020 (right femoral catheter removed)  -Hx of AICD x2 (initial system turned off due to lead fx & subcutaneous [employer] ICD implanted at [facility]). -Hx of LUE [place] AVG  -Hx of RUE bovine AVG  -Hx of IVC filter  -Cardiology consulted  Complication of HD access  -s/p exploration of AVG 04/02  -surgical wound cx NGTD   · IV antibxs per infectious disease     PAD  -ABIs NC bilaterally. Right TBI 0.51 and left TBI 0.0 (Left ankle PVR waveforms appear within normal limits & absent left great toe PPG signal). Left foot is warm and perfused w/o CLI. LLE edema. -left femoral to popliteal vein bypass. Duplex reveals a patent bypass  -ASA and statin      ESRF  -s/p renal cell transplant x2 (1992 & 2001)  -home HD 5 days per week [facility]  Hypoantremia   Hyperkalemia   -resolved  Anemia of chronic renal disease   -Retacrit  -labile   · Plan per nephrology     HTN  -labile   HLD  ASHD  -ASA, BBlocker, & statin   -remote hx of stenting   Ischemic Cardiomyopathy  Chronic systolic CHF   -EF 59-93%    AICD  -#2 ICD present     Seizure disorder     GERD  -PPI     VTE Prophylaxis:  Recurrent DVT  -s/p IVC  -warfarin   Hx of H. I.T  Warfarin subtherapeutic   SCDs contraindicated in PAD  Encourage OOB       Disposition:  Likely home

## 2021-04-05 NOTE — PROGRESS NOTES
Pharmacy Daily Dosing of Warfarin    Indication: AFib    Goal INR: 2-3    PTA Warfarin Dose: warfarin 2.5 mg daily    Concurrent anticoagulants: NA    Concurrent antiplatelet: aspirin    Major Interacting Medications   Drugs that may increase INR: NA  Drugs that may decrease INR: NA    Conditions that may increase/decrease INR (CHF, C. diff, cirrhosis, thyroid disorder, hypoalbuminemia): NA    Labs:  Recent Labs     04/05/21  0317 04/04/21  0429 04/03/21  0400   INR 1.7* 1.5* 1.4*   HGB 7.0* 7.3* 7.7*    258 243     Impression/Plan:   Warfain 3mg PO x 1 today. Daily INR  CBC w/o diff QOD     Pharmacy will follow daily and adjust the dose as appropriate. Thanks  Britany Umana PHARMD      [email or web address]. pdf

## 2021-04-05 NOTE — PROGRESS NOTES
Cardiology Progress Note  4/5/2021     Admit Date: 3/29/2021  Admit Diagnosis: Gram-positive bacteremia [R78.81]  Leucocytosis [D72.829]  ESRD (end stage renal disease) on dialysis ([facility] [address] [N18.6, Z99.2]  CC: none currently  Cardiac Assessment/Plan (per Dr Rosann Dubin):   Usual cardiologist:  Raman Marin MD     Assessment: 1. 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 Bacteremia 10/2020. EVER negative for overt endocarditis. BCX positive from 3/27, 3/29, 3/30. Negative 3/30, 4/1. Now on vancomycin (methicillin resistant). 2. Remote SJM single chamber ICD implant (dual coil shock lead) in 2009 with gen change in 2016 due to battery depletion, subsequent diagnosis of lead fracture, system turned OFF, subcutaneous [employer] ICD implanted at [facility]. No evidence of S-ICD pocket infection. 3. Ischemic cardiomyopathy, primary prevention indication for ICD implant. 4. CAD with anterior MI and remote stenting in 2007. Chronic aspirin therapy. 5. Chronic systolic CHF. 6. Remote history of endocarditis. 7. Remote history of IVC filter due to lower extremity DVT. Chronic anticoagulation with warfarin. 8. ESRD with dialysis. Remote renal transplant x 2 due to renal failure with nephrotic syndrome. Failed transplants, chronic hemodialysis with history of access issues, using LUE AV graft currently. 9. History of heparin associated thrombocytopenia. 10. Anemia due to chronic renal disease. Hgb 6.1 on 4/2. Normal platelets. 11. Iodine contrast reaction (per notes). 12. Full code.     Plan:      1. Endovascular SJM ICD extraction is reasonable to consider but not without substantial risk--this would have to be done outside of [facility]. Discussed with ID would be great if the subcutaneous ICD could stay in place, but that could come out too if needed at lower risk. .  2. He is not pacemaker-dependent nor has he received therapy for VT-VF, this is a primary prevention device implant.     S/p AV graft surgery without obvious infection. Dr. Marlena Sarabia was unable to arrange procedure in [facility] system.     It seems that he's gotten his latest cardiology care at [address] [place] [address], so can contact them regarding appropriateness for extraction. The number for [facility] EP is [phone], choose option 5 for physician to physician communication. I have called the above number this AM   and discussed with Dr. Chanda Muñoz, patients [address] [place] [address] electrophysiologist.  He accepts patient for transfer to [address] [place] [address] [phone]. Will ask hospitalist to contact transfer center. Addendum:    Ideally would transfer to [facility]. If unable, patient is medically stable and is receiving abx with hemodialysis. If appt can be made to see Dr. Annalee Moore within 2-3 days could be admitted electively to [address] [place] [address] for procedure. Cardiac meds: asa; lipitor 20; toprol Xl 12.5; coumadin. Receiving Tx today. For other plans, see orders.   Hospital problem list     Active Hospital Problems    Diagnosis Date Noted    Leucocytosis 2021    Gram-positive bacteremia 2021    ESRD (end stage renal disease) on dialysis ([facility] [address] 2013        Subjective: Yoshi Christian reports       Chest pain X none  consistent with:  Non-cardiac CP         Atypical CP     None now  On going  Anginal CP     Dyspnea X none  at rest  with exertion         improved  unchanged  worse              PND X none  overnight       Orthopnea X none  improved  unchanged  worse   Presyncope X none  improved  unchanged  worse     Ambulated in hallway without symptoms   Yes   Ambulated in room without symptoms  Yes   Objective:     Physical Exam:  Overall VSSAF;    Visit Vitals  /80 (BP 1 Location: Right upper arm, BP Patient Position: At rest;Lying)   Pulse 99   Temp 97.4 °F (36.3 °C)   Resp 16   Ht 5' 7\" (1.702 m)   Wt 68.5 kg (151 lb 0.2 oz)   SpO2 97%   BMI 23.65 kg/m²     Temp (24hrs), Av.9 °F (36.6 °C), Min:97.4 °F (36.3 °C), Max:98.4 °F (36.9 °C)    Patient Vitals for the past 8 hrs:   Pulse   04/05/21 0347 99   04/04/21 2300 98     Patient Vitals for the past 8 hrs:   Resp   04/05/21 0347 16   04/04/21 2300 16     Patient Vitals for the past 8 hrs:   BP   04/05/21 0347 115/80   04/04/21 2300 129/79       Intake/Output Summary (Last 24 hours) at 4/5/2021 0647  Last data filed at 4/4/2021 1747  Gross per 24 hour   Intake 720 ml   Output    Net 720 ml     General Appearance: Well developed, well nourished, no acute distress. Ears/Nose/Mouth/Throat:   Normal MM; anicteric. JVP: WNL   Resp:   Few basal crackles. Nl resp effort. Cardiovascular:  RRR, S1, S2 normal, no new murmur. No gallop or rub. Abdomen:   Soft, non-tender, bowel sounds are present. Extremities: Mild edema bilaterally. Skin:  Neuro: Warm and dry. A/O x3, grossly nonfocal       cath site intact w/o hematoma or new bruit; distal pulse unchanged  Yes   Data Review:     Telemetry independently reviewed x sinus  chronic afib  parox afib  NSVT     ECG independently reviewed  NSR  afib  no significant changes  NSST-Tw chgs     x no new ECG provided for review   Lab results reviewed as noted below. Current medications reviewed as noted below. No results for input(s): PH, PCO2, PO2 in the last 72 hours. No results for input(s): CPK, CKMB, CKNDX, TROIQ in the last 72 hours.   Recent Labs     04/05/21  0317 04/04/21  2351 04/04/21  0429 04/03/21  0400 04/02/21  0911   * 128*  --  133* 131*   K 7.1* 6.7*  --  4.9 4.6   CL 93* 95*  --  99 93*   CO2 21 22  --  27 28   BUN 53* 48*  --  23* 29*   CREA 8.80* 8.40*  --  5.49* 6.61*   GFRAA 8* 8*  --  14* 11*   GLU 82 86  --  118* 87   PHOS 5.1*  --   --  4.0 3.4   CA 8.9 9.2  --  9.0 9.0   ALB  --   --   --   --  3.1*   WBC 17.9*  --  17.1* 13.2*  --    HGB 7.0*  --  7.3* 7.7* 6.1*   HCT 22.2*  --  22.5* 23.7* 18.8*     --  258 243  --      Lab Results   Component Value Date/Time    Cholesterol, total 139 05/22/2012 05:00 PM    HDL Cholesterol 32 05/22/2012 05:00 PM    LDL, calculated 75.8 05/22/2012 05:00 PM    Triglyceride 156 (H) 05/22/2012 05:00 PM    CHOL/HDL Ratio 4.3 05/22/2012 05:00 PM     Recent Labs     04/02/21  0911   ALB 3.1*     Recent Labs     04/05/21  0317 04/04/21  0429 04/03/21  0400   INR 1.7* 1.5* 1.4*   PTP 17.1* 15.0* 14.0*      No components found for: GLPOC    Current Facility-Administered Medications   Medication Dose Route Frequency    bisacodyL (DULCOLAX) tablet 5 mg  5 mg Oral DAILY    polyethylene glycol (MIRALAX) packet 17 g  17 g Oral DAILY    polyethylene glycol (MIRALAX) packet 34 g  34 g Oral DAILY    Vancomycin random level 4/5, 0400.  thanks   Other ONCE    0.9% sodium chloride infusion 250 mL  250 mL IntraVENous PRN    0.9% sodium chloride infusion 250 mL  250 mL IntraVENous PRN    0.9% sodium chloride infusion 250 mL  250 mL IntraVENous PRN    morphine injection 2 mg  2 mg IntraVENous Q3H PRN    melatonin tablet 3 mg  3 mg Oral QHS PRN    WARFARIN INFORMATION NOTE (COUMADIN)   Other QPM    Vancomycin - Pharmacy to dose   Other Rx Dosing/Monitoring    atorvastatin (LIPITOR) tablet 20 mg  20 mg Oral DAILY    aspirin chewable tablet 81 mg  81 mg Oral DAILY    metoprolol succinate (TOPROL-XL) XL tablet 12.5 mg  12.5 mg Oral DAILY    pantoprazole (PROTONIX) tablet 40 mg  40 mg Oral ACB    calcitRIOL (ROCALTROL) capsule 0.5 mcg  0.5 mcg Oral DAILY    sodium chloride (NS) flush 5-40 mL  5-40 mL IntraVENous Q8H    sodium chloride (NS) flush 5-40 mL  5-40 mL IntraVENous PRN    acetaminophen (TYLENOL) tablet 650 mg  650 mg Oral Q6H PRN    Or    acetaminophen (TYLENOL) suppository 650 mg  650 mg Rectal Q6H PRN    ondansetron (ZOFRAN ODT) tablet 4 mg  4 mg Oral Q8H PRN    Or    ondansetron (ZOFRAN) injection 4 mg  4 mg IntraVENous Q6H PRN    calcium acetate(phosphat bind) (PHOSLO) capsule 1,334 mg  2 Cap Oral TID WITH MEALS    epoetin emilie-epbx (RETACRIT) 12,000 Units combo injection  12,000 Units SubCUTAneous Q MON, WED & FRI    oxyCODONE-acetaminophen (PERCOCET) 5-325 mg per tablet 1 Tab  1 Tab Oral Q6H PRN        Osvaldo Weems MD

## 2021-04-05 NOTE — PROGRESS NOTES
Infectious Disease Progress Note         Interval:  NAEO    Subjective:   Seen at dialysis. Reports feeling well. Has a new nasal congestion that developed over the weekend, feels it's allergies. Objective:    Vitals:   Patient Vitals for the past 24 hrs:   Temp Pulse Resp BP SpO2   04/05/21 0815  95 20 119/67 97 %   04/05/21 0806 98 °F (36.7 °C) 85 20 115/63 99 %   04/05/21 0347 97.4 °F (36.3 °C) 99 16 115/80 97 %   04/04/21 2300 98.2 °F (36.8 °C) 98 16 129/79 96 %   04/04/21 2005 97.8 °F (36.6 °C) 97 16 127/82 95 %   04/04/21 1508 97.4 °F (36.3 °C) (!) 102 16 118/78 96 %   04/04/21 1150 98.1 °F (36.7 °C) 94 16 119/79 95 %       Physical Exam:  ? GEN: NAD  ? HEENT: Normocephalic, atraumatic, PERRL, no scleral icterus  ? CV: S1, S2 heard regularly, no murmurs, + thrill at the AV graft left arm, pacemaker sites appear benign - both the left lateral chest and the anterior chest region. ? Lungs: Clear to auscultation bilaterally  ? Abdomen: soft, non distended, non tende  ? Genitourinary: no oneal  ? Extremities: no edema  ? Neuro: Alert, oriented to time, place and situation, moves all extremities to commands, verbal   ? Skin: no rash  ? Psych: good affect, good eye contact, non tearful   ?  Lines: left arm AV graft        Labs:  Recent Results (from the past 24 hour(s))   METABOLIC PANEL, BASIC    Collection Time: 04/04/21 11:51 PM   Result Value Ref Range    Sodium 128 (L) 136 - 145 mmol/L    Potassium 6.7 (HH) 3.5 - 5.1 mmol/L    Chloride 95 (L) 97 - 108 mmol/L    CO2 22 21 - 32 mmol/L    Anion gap 11 5 - 15 mmol/L    Glucose 86 65 - 100 mg/dL    BUN 48 (H) 6 - 20 MG/DL    Creatinine 8.40 (H) 0.70 - 1.30 MG/DL    BUN/Creatinine ratio 6 (L) 12 - 20      GFR est AA 8 (L) >60 ml/min/1.73m2    GFR est non-AA 7 (L) >60 ml/min/1.73m2    Calcium 9.2 8.5 - 10.1 MG/DL   PROTHROMBIN TIME + INR    Collection Time: 04/05/21  3:17 AM   Result Value Ref Range    INR 1.7 (H) 0.9 - 1.1      Prothrombin time 17.1 (H) 9.0 - 11.1 sec   VANCOMYCIN, RANDOM    Collection Time: 04/05/21  3:17 AM   Result Value Ref Range    Vancomycin, random 21.0 UG/ML   METABOLIC PANEL, BASIC    Collection Time: 04/05/21  3:17 AM   Result Value Ref Range    Sodium 129 (L) 136 - 145 mmol/L    Potassium 7.1 (HH) 3.5 - 5.1 mmol/L    Chloride 93 (L) 97 - 108 mmol/L    CO2 21 21 - 32 mmol/L    Anion gap 15 5 - 15 mmol/L    Glucose 82 65 - 100 mg/dL    BUN 53 (H) 6 - 20 MG/DL    Creatinine 8.80 (H) 0.70 - 1.30 MG/DL    BUN/Creatinine ratio 6 (L) 12 - 20      GFR est AA 8 (L) >60 ml/min/1.73m2    GFR est non-AA 7 (L) >60 ml/min/1.73m2    Calcium 8.9 8.5 - 10.1 MG/DL   MAGNESIUM    Collection Time: 04/05/21  3:17 AM   Result Value Ref Range    Magnesium 2.3 1.6 - 2.4 mg/dL   PHOSPHORUS    Collection Time: 04/05/21  3:17 AM   Result Value Ref Range    Phosphorus 5.1 (H) 2.6 - 4.7 MG/DL   CBC WITH AUTOMATED DIFF    Collection Time: 04/05/21  3:17 AM   Result Value Ref Range    WBC 17.9 (H) 4.1 - 11.1 K/uL    RBC 2.25 (L) 4.10 - 5.70 M/uL    HGB 7.0 (L) 12.1 - 17.0 g/dL    HCT 22.2 (L) 36.6 - 50.3 %    MCV 98.7 80.0 - 99.0 FL    MCH 31.1 26.0 - 34.0 PG    MCHC 31.5 30.0 - 36.5 g/dL    RDW 17.0 (H) 11.5 - 14.5 %    PLATELET 202 725 - 068 K/uL    MPV 10.5 8.9 - 12.9 FL    NRBC 0.0 0  WBC    ABSOLUTE NRBC 0.00 0.00 - 0.01 K/uL    NEUTROPHILS 74 32 - 75 %    LYMPHOCYTES 11 (L) 12 - 49 %    MONOCYTES 10 5 - 13 %    EOSINOPHILS 3 0 - 7 %    BASOPHILS 1 0 - 1 %    IMMATURE GRANULOCYTES 1 (H) 0.0 - 0.5 %    ABS. NEUTROPHILS 13.2 (H) 1.8 - 8.0 K/UL    ABS. LYMPHOCYTES 1.9 0.8 - 3.5 K/UL    ABS. MONOCYTES 1.9 (H) 0.0 - 1.0 K/UL    ABS. EOSINOPHILS 0.6 (H) 0.0 - 0.4 K/UL    ABS. BASOPHILS 0.1 0.0 - 0.1 K/UL    ABS. IMM. GRANS. 0.2 (H) 0.00 - 0.04 K/UL    DF AUTOMATED     RBC, ALLOCATE    Collection Time: 04/05/21  8:45 AM   Result Value Ref Range    HISTORY CHECKED?  Historical check performed            Micro:  3/27: Blood cultures 4/4 bottles with Staph epidermidis (Methicillin-R),  (Vanc ZECHARIAH 1ug/mL) , Willian Cov 2 negative   3/29: Blood cultures 4/4 bottles positive for MRSE  3/30: Blood cultures with MRSE  4/1: Blood cultures with MRSE (Vanc ZECHARIAH 1ug/mL)  4/2: AV graft fluid: No WBCs, no organisms on gram stain. Cultures negative so far. Assessment:  40 yo M with:        - Sepsis due to high-grade MRSE bacteremia this admission.   - CAD with MI with 2 AICD placements (2009 - non-functioning due to fractured lead, then a subcutaneous ICD placement in 2019): patient has both devices in him. 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 - S/p AV graft exploration on 4/2. Operative findings not suggestive of infection, and the gram stain and cultures from the graft have been negative. - Nephrotic syndome s/p 2 renal transplants. Both transplants failed due to graft-medication non-compliance (last transplant in 2001). - ESRD on iHD, 5 times a week from home since 2012. In 09/2020 had left AV graft placed since complicated by clotting issues. -  MSSA bacteremia in 2017  - MRSE bacteremia in 10/2020 likely femoral HD line-related and possible L1-L2 discitis/osteomyelitis treated with 6 weeks of vancomycin. Since 11/2020, the graft is being used for dialysis.       Recommendations:  - Continuous, high grade MRSE bacteremia highly suspicious for endovascular ICD infection. Patient is being transferred to the [facility] for endovascular ICD removal.   - No signs of infectious involvement of the subcutaneous-ICD clinically on exam. Since all the components of this device are subcutaneous and no exposure to vascular space, suspicion on infectious involvement is low right now and hence extraction is not indicated at this time. Continue to monitor for signs of pocket infection for this device.    - No documented negative blood cultures here so far this admission (see micro above). Unless the ICD is removed, I doubt if clearance will be achieved. At this time, we won't get repeat blood cultures anymore as these are going to be positive. He needs source control with the ICD removal.   - Once ICD is removed, obtain blood cultures. Start of date of IV vancomycin will begin from date of first negative blood cultures taken AFTER the ICD removal. Duration will be atleast 6 weeks of IV vancomycin goal trough 15-20. Please note that patient should be be receiving any less than 6 weeks of vancomycin.   - Patient can continue to follow [facility] ID or [facility] ID after discharge from [facility]. Can call [phone] for appointment. Will follow. Perfecrtserve with any Qs. Thank you for the opportunity to participate in the care of this patient. Please contact with questions or concerns.       Shashi Alvarez MD  Infectious Diseases

## 2021-04-05 NOTE — PROGRESS NOTES
Received notification from bedside RN about patient with regards to: K+ 6.7  VS: /79, HR 98, RR 16, O2 sat 96% on RA    Intervention given:  Will have dialysis this am, patient asymptomatic - no additional order

## 2021-04-05 NOTE — PROGRESS NOTES
Pharmacy - EPO Dosing & Monitoring    Dose and schedule: [id number] MW    Labs:  Recent Labs     04/05/21  0317 04/04/21  0429 04/03/21  0400   HGB 7.0* 7.3* 7.7*       Impression/Plan:   Hgb < 10 - will dispense     ThanksRuben, PHARMD    [email or web address]. [email or web address]

## 2021-04-05 NOTE — PROGRESS NOTES
Nephrology Progress Note  Blaise Leblanc     [facility]. [facility]  Phone - [phone]   Patient: Yoshi Christian    YOB: 1975        Date- 4/5/2021   Admit Date: 3/29/2021  CC: Follow up for ESRD        IMPRESSION & PLAN:   · End-stage renal disease home HD 5 days per week- Follows up with Dr Breana Esparza at [facility]  · Failed RTX times 2  · Hyperkalemia  · Gram positive sepsis  · Anemia of ckd  · Hx of renal tx in past times 2.  · Hypertension  · CAD  · H/o DVT, s/p ivc filter/ h/o HIT     Plan:  -Plan for dialysis today  -Plan to continue Monday Wednesday Friday dialysis  -Will transfuse 1 unit of PRBC today with HD  -Vancomycin per ID.  -Ongoing work up for source of infection. -Appreciated Dr Flores Camera input, so far cultures negative from AVG site so far. Did not looked infected. Subjective: Interval History:   -  Feels SOB today  -  Hgb low this am.  -  Seen on HD today. Objective:   Vitals:    04/05/21 0900 04/05/21 0913 04/05/21 0915 04/05/21 0916   BP: (!) 123/59      Pulse: 98 99  (!) 106   Resp: 18   18   Temp:   98.5 °F (36.9 °C)    TempSrc:       SpO2: 100%   100%   Weight:       Height:          04/04 0701 - 04/05 0700  In: 720 [P.O.:720]  Out: -   Last 3 Recorded Weights in this Encounter    04/02/21 1448 04/04/21 1412 04/05/21 0406   Weight: 65.4 kg (144 lb 2.9 oz) 67.4 kg (148 lb 9.4 oz) 68.5 kg (151 lb 0.2 oz)      Physical exam:   GEN: NAD  NECK- Supple, no mass  RESP: No wheezing, clear b/l  CVS: S1,S2  RRR  NEURO: Normal speech, nonfocal   EXT: No Edema   PSYCH: Normal Mood    Chart reviewed. Pertinent Notes reviewed.      Data Review :  Recent Labs     04/05/21  0317 04/04/21  2351 04/03/21  0400   * 128* 133*   K 7.1* 6.7* 4.9   CL 93* 95* 99   CO2 21 22 27   BUN 53* 48* 23*   CREA 8.80* 8.40* 5.49*   GLU 82 86 118*   CA 8.9 9.2 9.0   MG 2.3  --  2.2   PHOS 5.1*  --  4.0     Recent Labs     04/05/21 0317 04/04/21  0424 04/03/21  0400   WBC 17.9* 17.1* 13.2*   HGB 7.0* 7.3* 7.7*   HCT 22.2* 22.5* 23.7*    258 243     No results for input(s): FE, TIBC, PSAT, FERR in the last 72 hours. Medication list  reviewed  Current Facility-Administered Medications   Medication Dose Route Frequency    0.9% sodium chloride infusion 250 mL  250 mL IntraVENous PRN    bisacodyL (DULCOLAX) tablet 5 mg  5 mg Oral DAILY    polyethylene glycol (MIRALAX) packet 17 g  17 g Oral DAILY    polyethylene glycol (MIRALAX) packet 34 g  34 g Oral DAILY    Vancomycin random level 4/5, 0400.  thanks   Other ONCE    0.9% sodium chloride infusion 250 mL  250 mL IntraVENous PRN    0.9% sodium chloride infusion 250 mL  250 mL IntraVENous PRN    0.9% sodium chloride infusion 250 mL  250 mL IntraVENous PRN    morphine injection 2 mg  2 mg IntraVENous Q3H PRN    melatonin tablet 3 mg  3 mg Oral QHS PRN    WARFARIN INFORMATION NOTE (COUMADIN)   Other QPM    Vancomycin - Pharmacy to dose   Other Rx Dosing/Monitoring    atorvastatin (LIPITOR) tablet 20 mg  20 mg Oral DAILY    aspirin chewable tablet 81 mg  81 mg Oral DAILY    metoprolol succinate (TOPROL-XL) XL tablet 12.5 mg  12.5 mg Oral DAILY    pantoprazole (PROTONIX) tablet 40 mg  40 mg Oral ACB    calcitRIOL (ROCALTROL) capsule 0.5 mcg  0.5 mcg Oral DAILY    sodium chloride (NS) flush 5-40 mL  5-40 mL IntraVENous Q8H    sodium chloride (NS) flush 5-40 mL  5-40 mL IntraVENous PRN    acetaminophen (TYLENOL) tablet 650 mg  650 mg Oral Q6H PRN    Or    acetaminophen (TYLENOL) suppository 650 mg  650 mg Rectal Q6H PRN    ondansetron (ZOFRAN ODT) tablet 4 mg  4 mg Oral Q8H PRN    Or    ondansetron (ZOFRAN) injection 4 mg  4 mg IntraVENous Q6H PRN    calcium acetate(phosphat bind) (PHOSLO) capsule 1,334 mg  2 Cap Oral TID WITH MEALS    epoetin emilie-epbx (RETACRIT) 12,000 Units combo injection  12,000 Units SubCUTAneous Q MON, WED & FRI    oxyCODONE-acetaminophen (PERCOCET) 5-325 mg per tablet 1 Tab  1 Tab Oral Q6H PRN          Anjel Cormier, [address] [facility]  [facility] / [facility]  Lizette Jerez [address], [address]  [place], [address]  Phone - [phone]               Fax - [phone]

## 2021-04-05 NOTE — PROGRESS NOTES
End of Shift Note Bedside shift change report given to Tavia Ragsdale RN (oncoming nurse) by Britt Mckay RN (offgoing nurse). Report included the following information SBAR, Procedure Summary, Intake/Output, MAR and Recent Results Shift worked:  7pm-7am 
  
Shift summary and any significant changes:  
 Pt had a good night, potassium leve 6.7 NP notified. Pt is scheduled for dialysis today Concerns for physician to address: None Zone phone for oncoming shift:  1012 Activity: 
Activity Level: Up ad fouzia Number times ambulated in hallways past shift: 0 Number of times OOB to chair past shift: 2 Cardiac:  
Cardiac Monitoring: Yes     
Cardiac Rhythm: Normal sinus rhythm Access:  
Current line(s): PIV Genitourinary:  
Urinary status: voiding Respiratory:  
O2 Device: Room air Chronic home O2 use?: NO Incentive spirometer at bedside: YES 
  
GI: 
Last Bowel Movement Date: 04/04/21 Current diet:  DIET RENAL Regular DIET ONE TIME MESSAGE Passing flatus: YES Tolerating current diet: YES 
% Diet Eaten: 100 % Pain Management:  
Patient states pain is manageable on current regimen: YES Skin: 
Yong Score: 22 Interventions: increase time out of bed Patient Safety: 
Fall Score: Total Score: 1 Interventions: gripper socks High Fall Risk: Yes Length of Stay: 
Expected LOS: 4d 19h Actual LOS: 7 Britt Mckay RN

## 2021-04-05 NOTE — PROGRESS NOTES
End of Shift Note    Bedside shift change report given to Caity Carson RN (oncoming nurse) by Rondel Kehr, LPN (offgoing nurse). Report included the following information SBAR, Kardex, ED Summary, OR Summary, Procedure Summary and MAR    Shift worked:  [facility]     Shift summary and any significant changes:    Dialysis today, new Left arm shunt, physician discontinued pain medication, tylenol may be used. Patient may be transferring to [facility]. Concerns for physician to address: Any further needs for pain control. Zone phone for oncoming shift:  [phone]       Activity:  Activity Level: Up ad fouzia  Number times ambulated in hallways past shift: 0  Number of times OOB to chair past shift: 1    Cardiac:   Cardiac Monitoring: Yes      Cardiac Rhythm: Normal sinus rhythm    Access:   Current line(s): PIV     Genitourinary:   Urinary status: anuric    Respiratory:   O2 Device: Room air  Chronic home O2 use?: N/A  Incentive spirometer at bedside: YES     GI:  Last Bowel Movement Date: 04/04/21  Current diet:  DIET RENAL Regular  DIET ONE TIME MESSAGE  Passing flatus: YES  Tolerating current diet:YES  % Diet Eaten: 100 %    Pain Management:   Patient states pain is manageable on current regimen: YES    Skin:  Yong Score: 22  Interventions: float heels, increase time out of bed and nutritional support     Patient Safety:  Fall Score:  Total Score: 1  Interventions: gripper socks and pt to call before getting OOB  High Fall Risk: Yes    Length of Stay:  Expected LOS: 4d 19h  Actual LOS: [address] [facility] [place], LPN

## 2021-04-05 NOTE — PROGRESS NOTES
3:13PM: consult acknowledged for arrangement of OP antibioitics infusion; patient received HD today. He normally is an at home HD patient 5 days a week. Spoke with [facility] at [facility] on [address] [facility] ([phone]) location. She stated that patient will need to choose 1 of 2 therapy regimes:  1) do his home hemo MWF early and then be at his OP HD center by 1:30PM those days, or 2) get his OP HD treatments on MWF at the OP HD treatment center where they can also administer his IV antibioitc. He will not be able to administer his IV AB at home himself. CM in to see patient and he is sleeping. CM will follow up a little later in the day. Once patient makes his decision, CM is to call Home Treatment nurse at [facility], [place] ([phone]) and advise so plans can be made. Transition of Care Plan:     RUR:   31   %     Disposition:  TBD: possibly home with sister who is his caregiver; currently patient and sister who is also his caregiver and assistant with his home hemodialysis reside in an apartment together. Patient has been independent with his activities of daily living including his home hemodialysis and he is assisted with his home hemodialysis through [facility] on [email or web address].      Follow up appointments:   Will be needed - PCP; Renal; cardiology; Vascular;  ID?     DME needed:  TBD; at this time patient has all of his home hemodialysis equipment and supplies and he gets continued shipments that are arranged through [facility] on [email or web address] [phone]; patient has a blood pressure monitoring unit.      Preferred Rx is [facility] on [address].     Transportation at Discharge: his car is here as he drove himself here the day of his admission     Keys or means to access home:  Both patient and sister Bailey Clements [phone] has keys to the apt,        IM Medicare letter:   2nd IM Letter will be needed prior to discharge     Caregiver Contact:  Sister, Karly Chaves [phone], was contacted today by CM. She provided as much information as she could     Discharge Caregiver contacted prior to discharge? SisterKarly [phone] will be contacted closer to discharge      Reason for Admission:   Gram positive bacteremia; ESRD; Leucocytosis                  RUR Score:     31  %        PCP:    First and Last name:  Yin Pabon MD - unable to be confirmed as sister who CM spoke with stated she does not know who PCP is                 Name of Practice:              Are you a current patient: Yes/No:              Approximate date of last visit:               Can you do a virtual visit with your PCP:              Resources/supports as identified by patient/family:   Patient has medicare and medicaid                Top Challenges facing patient (as identified by patient/family and CM): Finances/Medication cost?      Medicaid assists with cost of meds              Transportation? Medicaid assists with transportation if patient is not able to drive himself              Support system or lack thereof? He has his sister Woody Hobson as a partner with his home hemodialysis and she also helps him at the apartment                     Living arrangements? Resides with sister in apartment              Self-care/ADLs/Cognition?   Per sister, patient has been independent with his ADL's and Home hemodialysis - sister also helps him with home hemodialysis          Current Advanced Directive/Advance Care Plan:  Full Code     CM was unable to discuss ACP with patient.     Payor Source Payor: VA MEDICARE / Plan: Patricia Alvarez / Product Type: Medicare /                              Plan for utilizing home health:    TBD                 Transition of Care Plan:        TBD but possibly home with his sister who is his caregiver and assistant with home hemo.      Maris [address] will continue to follow and assist with discharge [facility], [address] [place] [address].  Ext [phone]

## 2021-04-06 LAB
ABO + RH BLD: NORMAL
ANION GAP SERPL CALC-SCNC: 9 MMOL/L (ref 5–15)
BACTERIA SPEC CULT: ABNORMAL
BACTERIA SPEC CULT: NORMAL
BASOPHILS # BLD: 0.1 K/UL (ref 0–0.1)
BASOPHILS NFR BLD: 1 % (ref 0–1)
BLD PROD TYP BPU: NORMAL
BLOOD GROUP ANTIBODIES SERPL: NORMAL
BPU ID: NORMAL
BUN SERPL-MCNC: 31 MG/DL (ref 6–20)
BUN/CREAT SERPL: 5 (ref 12–20)
CALCIUM SERPL-MCNC: 9.1 MG/DL (ref 8.5–10.1)
CHLORIDE SERPL-SCNC: 99 MMOL/L (ref 97–108)
CO2 SERPL-SCNC: 24 MMOL/L (ref 21–32)
CREAT SERPL-MCNC: 5.82 MG/DL (ref 0.7–1.3)
CROSSMATCH RESULT,%XM: NORMAL
DIFFERENTIAL METHOD BLD: ABNORMAL
EOSINOPHIL # BLD: 0.5 K/UL (ref 0–0.4)
EOSINOPHIL NFR BLD: 4 % (ref 0–7)
ERYTHROCYTE [DISTWIDTH] IN BLOOD BY AUTOMATED COUNT: 17 % (ref 11.5–14.5)
GLUCOSE SERPL-MCNC: 96 MG/DL (ref 65–100)
GRAM STN SPEC: NORMAL
HCT VFR BLD AUTO: 24.5 % (ref 36.6–50.3)
HGB BLD-MCNC: 7.9 G/DL (ref 12.1–17)
IMM GRANULOCYTES # BLD AUTO: 0.1 K/UL (ref 0–0.04)
IMM GRANULOCYTES NFR BLD AUTO: 0 % (ref 0–0.5)
INR PPP: 1.8 (ref 0.9–1.1)
LYMPHOCYTES # BLD: 1.2 K/UL (ref 0.8–3.5)
LYMPHOCYTES NFR BLD: 10 % (ref 12–49)
MAGNESIUM SERPL-MCNC: 2.6 MG/DL (ref 1.6–2.4)
MCH RBC QN AUTO: 30.9 PG (ref 26–34)
MCHC RBC AUTO-ENTMCNC: 32.2 G/DL (ref 30–36.5)
MCV RBC AUTO: 95.7 FL (ref 80–99)
MONOCYTES # BLD: 1.4 K/UL (ref 0–1)
MONOCYTES NFR BLD: 11 % (ref 5–13)
NEUTS SEG # BLD: 9.2 K/UL (ref 1.8–8)
NEUTS SEG NFR BLD: 74 % (ref 32–75)
NRBC # BLD: 0 K/UL (ref 0–0.01)
NRBC BLD-RTO: 0 PER 100 WBC
PHOSPHATE SERPL-MCNC: 4.3 MG/DL (ref 2.6–4.7)
PLATELET # BLD AUTO: 224 K/UL (ref 150–400)
PMV BLD AUTO: 10.5 FL (ref 8.9–12.9)
POTASSIUM SERPL-SCNC: 4.9 MMOL/L (ref 3.5–5.1)
PROTHROMBIN TIME: 18.8 SEC (ref 9–11.1)
RBC # BLD AUTO: 2.56 M/UL (ref 4.1–5.7)
SERVICE CMNT-IMP: ABNORMAL
SERVICE CMNT-IMP: NORMAL
SODIUM SERPL-SCNC: 132 MMOL/L (ref 136–145)
SPECIMEN EXP DATE BLD: NORMAL
STATUS OF UNIT,%ST: NORMAL
UNIT DIVISION, %UDIV: 0
WBC # BLD AUTO: 12.5 K/UL (ref 4.1–11.1)

## 2021-04-06 PROCEDURE — 74011250637 HC RX REV CODE- 250/637: Performed by: STUDENT IN AN ORGANIZED HEALTH CARE EDUCATION/TRAINING PROGRAM

## 2021-04-06 PROCEDURE — 80048 BASIC METABOLIC PNL TOTAL CA: CPT

## 2021-04-06 PROCEDURE — 85610 PROTHROMBIN TIME: CPT

## 2021-04-06 PROCEDURE — 99233 SBSQ HOSP IP/OBS HIGH 50: CPT | Performed by: STUDENT IN AN ORGANIZED HEALTH CARE EDUCATION/TRAINING PROGRAM

## 2021-04-06 PROCEDURE — 65270000029 HC RM PRIVATE

## 2021-04-06 PROCEDURE — 83735 ASSAY OF MAGNESIUM: CPT

## 2021-04-06 PROCEDURE — 36415 COLL VENOUS BLD VENIPUNCTURE: CPT

## 2021-04-06 PROCEDURE — 85025 COMPLETE CBC W/AUTO DIFF WBC: CPT

## 2021-04-06 PROCEDURE — 84100 ASSAY OF PHOSPHORUS: CPT

## 2021-04-06 RX ORDER — WARFARIN 2.5 MG/1
2.5 TABLET ORAL ONCE
Status: COMPLETED | OUTPATIENT
Start: 2021-04-06 | End: 2021-04-06

## 2021-04-06 RX ADMIN — Medication 10 ML: at 15:01

## 2021-04-06 RX ADMIN — CALCIUM ACETATE 1334 MG: 667 CAPSULE ORAL at 09:04

## 2021-04-06 RX ADMIN — ATORVASTATIN CALCIUM 20 MG: 20 TABLET, FILM COATED ORAL at 09:05

## 2021-04-06 RX ADMIN — CALCITRIOL CAPSULES 0.25 MCG 0.5 MCG: 0.25 CAPSULE ORAL at 09:06

## 2021-04-06 RX ADMIN — Medication 10 ML: at 22:54

## 2021-04-06 RX ADMIN — CALCIUM ACETATE 1334 MG: 667 CAPSULE ORAL at 16:19

## 2021-04-06 RX ADMIN — CALCIUM ACETATE 1334 MG: 667 CAPSULE ORAL at 12:42

## 2021-04-06 RX ADMIN — Medication 10 ML: at 05:30

## 2021-04-06 RX ADMIN — ASPIRIN 81 MG: 81 TABLET, CHEWABLE ORAL at 09:05

## 2021-04-06 RX ADMIN — WARFARIN SODIUM 2.5 MG: 2.5 TABLET ORAL at 17:56

## 2021-04-06 RX ADMIN — PANTOPRAZOLE SODIUM 40 MG: 40 TABLET, DELAYED RELEASE ORAL at 05:44

## 2021-04-06 RX ADMIN — METOPROLOL SUCCINATE 12.5 MG: 25 TABLET, EXTENDED RELEASE ORAL at 09:07

## 2021-04-06 RX ADMIN — ACETAMINOPHEN 650 MG: 325 TABLET, FILM COATED ORAL at 17:56

## 2021-04-06 NOTE — PROGRESS NOTES
Pharmacy Daily Dosing of Warfarin    Indication: AFib    Goal INR: 2-3    PTA Warfarin Dose: warfarin 2.5 mg daily    Concurrent anticoagulants: NA    Concurrent antiplatelet: aspirin    Major Interacting Medications   Drugs that may increase INR: NA  Drugs that may decrease INR: NA    Conditions that may increase/decrease INR (CHF, C. diff, cirrhosis, thyroid disorder, hypoalbuminemia): NA    Labs:  Recent Labs     04/06/21  0312 04/05/21  0317 04/04/21  0429   INR 1.8* 1.7* 1.5*   HGB 7.9* 7.0* 7.3*    244 258     Impression/Plan:   Warfain 2.5 mg PO x 1 today. Daily INR  CBC w/o diff QOD     Pharmacy will follow daily and adjust the dose as appropriate.     Thanks  Bobby Barrow, [facility]

## 2021-04-06 NOTE — PROGRESS NOTES
Nephrology Progress Note  Blaise Leblanc     www. [facility]  Phone - [phone]   Patient: Nikko Rock    YOB: 1975        Date- 4/6/2021   Admit Date: 3/29/2021  CC: Follow up for ESRD        IMPRESSION & PLAN:   · End-stage renal disease home HD 5 days per week- Follows up with Dr Omega Lancaster at [facility]  · Failed RTX times 2  · Hyperkalemia  · Gram positive sepsis  · Anemia of ckd  · Hx of renal tx in past times 2.  · Hypertension  · CAD  · H/o DVT, s/p ivc filter/ h/o HIT     Plan:  -Plan for dialysis tomororw  -Plan to continue Monday Wednesday Friday dialysis  -Recent cultures negative so far  -Vancomycin per ID.  -Ongoing work up for source of infection. -Appreciated Dr Gene Stevenson input, so far cultures negative from AVG site so far. Did not looked infected. Subjective: Interval History:   -  Feels  Better today    Objective:   Vitals:    04/05/21 2338 04/06/21 0250 04/06/21 0458 04/06/21 0820   BP: 115/69 102/72  113/75   Pulse: 89 82  82   Resp: 18 18  17   Temp: 98.3 °F (36.8 °C) 97.6 °F (36.4 °C)  98.1 °F (36.7 °C)   TempSrc:       SpO2: 97% 100%  100%   Weight:   66.3 kg (146 lb 2.6 oz)    Height:          04/05 0701 - 04/06 0700  In: 1690 [P.O.:1440; I.V.:250]  Out: 3000   Last 3 Recorded Weights in this Encounter    04/04/21 1412 04/05/21 0406 04/06/21 0458   Weight: 67.4 kg (148 lb 9.4 oz) 68.5 kg (151 lb 0.2 oz) 66.3 kg (146 lb 2.6 oz)      Physical exam:   GEN: NAD  NECK- Supple, no mass  RESP: No wheezing, clear b/l  CVS: S1,S2  RRR  NEURO: Normal speech, nonfocal   EXT: No Edema   PSYCH: Normal Mood    Chart reviewed. Pertinent Notes reviewed.      Data Review :  Recent Labs     04/06/21  0312 04/05/21 0317 04/04/21  2351   * 129* 128*   K 4.9 7.1* 6.7*   CL 99 93* 95*   CO2 24 21 22   BUN 31* 53* 48*   CREA 5.82* 8.80* 8.40*   GLU 96 82 86   CA 9.1 8.9 9.2   MG 2.6* 2.3  --    PHOS 4.3 5.1*  --      Recent Labs 04/06/21 0312 04/05/21 0317 04/04/21  0429   WBC 12.5* 17.9* 17.1*   HGB 7.9* 7.0* 7.3*   HCT 24.5* 22.2* 22.5*    244 258     No results for input(s): FE, TIBC, PSAT, FERR in the last 72 hours.    Medication list  reviewed  Current Facility-Administered Medications   Medication Dose Route Frequency    warfarin (COUMADIN) tablet 2.5 mg  2.5 mg Oral ONCE    0.9% sodium chloride infusion 250 mL  250 mL IntraVENous PRN    bisacodyL (DULCOLAX) tablet 5 mg  5 mg Oral DAILY    polyethylene glycol (MIRALAX) packet 34 g  34 g Oral DAILY    melatonin tablet 3 mg  3 mg Oral QHS PRN    WARFARIN INFORMATION NOTE (COUMADIN)   Other QPM    Vancomycin - Pharmacy to dose   Other Rx Dosing/Monitoring    atorvastatin (LIPITOR) tablet 20 mg  20 mg Oral DAILY    aspirin chewable tablet 81 mg  81 mg Oral DAILY    metoprolol succinate (TOPROL-XL) XL tablet 12.5 mg  12.5 mg Oral DAILY    pantoprazole (PROTONIX) tablet 40 mg  40 mg Oral ACB    calcitRIOL (ROCALTROL) capsule 0.5 mcg  0.5 mcg Oral DAILY    sodium chloride (NS) flush 5-40 mL  5-40 mL IntraVENous Q8H    sodium chloride (NS) flush 5-40 mL  5-40 mL IntraVENous PRN    acetaminophen (TYLENOL) tablet 650 mg  650 mg Oral Q6H PRN    Or    acetaminophen (TYLENOL) suppository 650 mg  650 mg Rectal Q6H PRN    ondansetron (ZOFRAN ODT) tablet 4 mg  4 mg Oral Q8H PRN    Or    ondansetron (ZOFRAN) injection 4 mg  4 mg IntraVENous Q6H PRN    calcium acetate(phosphat bind) (PHOSLO) capsule 1,334 mg  2 Cap Oral TID WITH MEALS    epoetin emilie-epbx (RETACRIT) 12,000 Units combo injection  12,000 Units SubCUTAneous Q MON, WED & FRI          Julia Ponce MD              [facility]  [facility] / [place] [facility] [place] [address], [facility], [address] [place]  Phone - [phone]               Fax - [phone]

## 2021-04-06 NOTE — PROGRESS NOTES
Infectious Disease Progress Note         Interval:  NAEO    Subjective:   He reports feeling OK overall. He is reporting some pain in left side of the neck that started day of the graft surgery. It is not getting worse. Objective:    Vitals:   Patient Vitals for the past 24 hrs:   Temp Pulse Resp BP SpO2   04/06/21 1219 98.3 °F (36.8 °C) 88 17 124/70 100 %   04/06/21 0820 98.1 °F (36.7 °C) 82 17 113/75 100 %   04/06/21 0250 97.6 °F (36.4 °C) 82 18 102/72 100 %   04/05/21 2338 98.3 °F (36.8 °C) 89 18 115/69 97 %   04/05/21 2247 97.9 °F (36.6 °C) 89 18 110/69 95 %   04/05/21 1950 98.4 °F (36.9 °C) 87 19 130/70 94 %   04/05/21 1447 99.4 °F (37.4 °C) 94 18 (!) 156/80 96 %       Physical Exam:  ? GEN: NAD  ? HEENT: Normocephalic, atraumatic, PERRL, no scleral icterus, no visible inflammation at the left neck region. ? CV: S1, S2 heard regularly, no murmurs, + thrill at the AV graft left arm, pacemaker sites appear benign - both the left lateral chest and the anterior chest region. ? Lungs: Clear to auscultation bilaterally  ? Genitourinary: no oneal  ? Extremities: no edema  ? Neuro: Alert, oriented to time, place and situation, moves all extremities to commands, verbal   ? Skin: no rash, left chest subcutaneous-ICD site looks benign and no signs of inflammation or tenderness there. ? Psych: good affect, good eye contact, non tearful   ?  Lines: left arm AV graft         Labs:  Recent Results (from the past 24 hour(s))   PROTHROMBIN TIME + INR    Collection Time: 04/06/21  3:12 AM   Result Value Ref Range    INR 1.8 (H) 0.9 - 1.1      Prothrombin time 18.8 (H) 9.0 - 52.6 sec   METABOLIC PANEL, BASIC    Collection Time: 04/06/21  3:12 AM   Result Value Ref Range    Sodium 132 (L) 136 - 145 mmol/L    Potassium 4.9 3.5 - 5.1 mmol/L    Chloride 99 97 - 108 mmol/L    CO2 24 21 - 32 mmol/L    Anion gap 9 5 - 15 mmol/L    Glucose 96 65 - 100 mg/dL    BUN 31 (H) 6 - 20 MG/DL    Creatinine 5.82 (H) 0.70 - 1.30 MG/DL    BUN/Creatinine ratio 5 (L) 12 - 20      GFR est AA 13 (L) >60 ml/min/1.73m2    GFR est non-AA 11 (L) >60 ml/min/1.73m2    Calcium 9.1 8.5 - 10.1 MG/DL   PHOSPHORUS    Collection Time: 04/06/21  3:12 AM   Result Value Ref Range    Phosphorus 4.3 2.6 - 4.7 MG/DL   MAGNESIUM    Collection Time: 04/06/21  3:12 AM   Result Value Ref Range    Magnesium 2.6 (H) 1.6 - 2.4 mg/dL   CBC WITH AUTOMATED DIFF    Collection Time: 04/06/21  3:12 AM   Result Value Ref Range    WBC 12.5 (H) 4.1 - 11.1 K/uL    RBC 2.56 (L) 4.10 - 5.70 M/uL    HGB 7.9 (L) 12.1 - 17.0 g/dL    HCT 24.5 (L) 36.6 - 50.3 %    MCV 95.7 80.0 - 99.0 FL    MCH 30.9 26.0 - 34.0 PG    MCHC 32.2 30.0 - 36.5 g/dL    RDW 17.0 (H) 11.5 - 14.5 %    PLATELET 568 447 - 729 K/uL    MPV 10.5 8.9 - 12.9 FL    NRBC 0.0 0  WBC    ABSOLUTE NRBC 0.00 0.00 - 0.01 K/uL    NEUTROPHILS 74 32 - 75 %    LYMPHOCYTES 10 (L) 12 - 49 %    MONOCYTES 11 5 - 13 %    EOSINOPHILS 4 0 - 7 %    BASOPHILS 1 0 - 1 %    IMMATURE GRANULOCYTES 0 0.0 - 0.5 %    ABS. NEUTROPHILS 9.2 (H) 1.8 - 8.0 K/UL    ABS. LYMPHOCYTES 1.2 0.8 - 3.5 K/UL    ABS. MONOCYTES 1.4 (H) 0.0 - 1.0 K/UL    ABS. EOSINOPHILS 0.5 (H) 0.0 - 0.4 K/UL    ABS. BASOPHILS 0.1 0.0 - 0.1 K/UL    ABS. IMM. GRANS. 0.1 (H) 0.00 - 0.04 K/UL    DF AUTOMATED             Micro:  3/27: Blood cultures 4/4 bottles with Staph epidermidis (Methicillin-R),  (Vanc ZECHARIAH 1ug/mL) , Willian Cov 2 negative   3/29: Blood cultures 4/4 bottles positive for MRSE  3/30: Blood cultures with MRSE  4/1: Blood cultures with MRSE (Vanc ZECHARIAH 1ug/mL)  4/2: AV graft fluid: No WBCs, no organisms on gram stain. Cultures negative so far. Assessment:  38 yo M with:        - Sepsis due to high-grade MRSE bacteremia this admission.   - CAD with MI with 2 AICD placements (2009 - non-functioning due to fractured lead, then a subcutaneous ICD placement in 2019): patient has both devices in him.    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 - S/p AV graft exploration on 4/2. Operative findings not suggestive of infection, and the gram stain and cultures from the graft have been negative. - Nephrotic syndome s/p 2 renal transplants. Both transplants failed due to graft-medication non-compliance (last transplant in 2001). - ESRD on iHD, 5 times a week from home since 2012. In 09/2020 had left AV graft placed since complicated by clotting issues. -  MSSA bacteremia in 2017  - MRSE bacteremia in 10/2020 likely femoral HD line-related and possible L1-L2 discitis/osteomyelitis treated with 6 weeks of vancomycin. Since 11/2020, the graft is being used for dialysis.       Recommendations:  - Patient is still bacteremia due to MRSE. Will recommend against outpatient management for this. He should continue on vancomycin with HD with goal trough 15-20. ICD removal needs to happen sooner rather than later. The more this is delayed, the more there is an increase in morbidity/ This is an aggressive infection. At this time we are waiting for beds at [facility]. Discussed with Dr. Dana Haines today. - No documented negative blood cultures here so far this admission (see micro above). Unless the ICD is removed, I doubt if clearance will be achieved. At this time, we won't get repeat blood cultures anymore as these are going to be positive. He needs source control with the ICD removal. If patient spikes, fever or decompensates hemodynamically, please obtain blood cultures. - Once ICD is removed, obtain blood cultures. Start of date of IV vancomycin will begin from date of first negative blood cultures taken AFTER the ICD removal. Duration will be atleast 6 weeks of IV vancomycin goal trough 15-20.  Please note that patient should be be receiving any less than 6 weeks of vancomycin.     - No signs of infectious involvement of the subcutaneous-ICD clinically on exam. Since all the components of this device are subcutaneous and no exposure to vascular space, suspicion on infectious involvement is low right now and hence extraction is not indicated at this time. Continue to monitor for signs of pocket infection for this device. - Patient can continue to follow [employer] ID or [facility] after discharge from [facility]. Can call [phone] for appointment. Will follow. Perfecrtserve with any Qs. Thank you for the opportunity to participate in the care of this patient. Please contact with questions or concerns.       Shweta Rajan MD  Infectious Diseases

## 2021-04-06 NOTE — PROGRESS NOTES
Cardiology Progress Note  4/6/2021     Admit Date: 3/29/2021  Admit Diagnosis: Gram-positive bacteremia [R78.81]  Leucocytosis [D72.829]  ESRD (end stage renal disease) on dialysis ([facility] [address] [N18.6, Z99.2]  CC: none currently  Cardiac Assessment/Plan (per Dr Flanagan ):   Usual cardiologist:  Suman Cade MD     Assessment: 1. 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 Bacteremia 10/2020. EVER negative for overt endocarditis. BCX positive from 3/27, 3/29, 3/30. Negative 3/30, 4/1. Now on vancomycin (methicillin resistant). 2. Remote SJM single chamber ICD implant (dual coil shock lead) in 2009 with gen change in 2016 due to battery depletion, subsequent diagnosis of lead fracture, system turned OFF, subcutaneous [employer] ICD implanted at [address] [place] [address]. No evidence of S-ICD pocket infection. 3. Ischemic cardiomyopathy, primary prevention indication for ICD implant. 4. CAD with anterior MI and remote stenting in 2007. Chronic aspirin therapy. 5. Chronic systolic CHF. 6. Remote history of endocarditis. 7. Remote history of IVC filter due to lower extremity DVT. Chronic anticoagulation with warfarin. 8. ESRD with dialysis. Remote renal transplant x 2 due to renal failure with nephrotic syndrome. Failed transplants, chronic hemodialysis with history of access issues, using LUE AV graft currently. 9. History of heparin associated thrombocytopenia. 10. Anemia due to chronic renal disease. Hgb 6.1 on 4/2. Normal platelets. 11. Iodine contrast reaction (per notes). 12. Full code.     Plan:      1. Endovascular SJM ICD extraction is reasonable to consider but not without substantial risk--this would have to be done outside of [facility]. Discussed with ID would be great if the subcutaneous ICD could stay in place, but that could come out too if needed at lower risk. .  2. He is not pacemaker-dependent nor has he received therapy for VT-VF, this is a primary prevention device implant.     S/p AV graft surgery without obvious infection. Dr. Manjeet Cho was unable to arrange procedure in [facility] system.     It seems that he's gotten his latest cardiology care at [facility], so can contact them regarding appropriateness for extraction. The number for [facility] EP is [phone], choose option 5 for physician to physician communication. I have called the above number this AM   and discussed with Dr. Kira Lepe, patients [facility] electrophysiologist.  He accepts patient for transfer to [facility] [phone]. Will ask hospitalist to contact transfer center. There was some thought about discharging patient to see Dr. Ceferino Gomez as outpt, but after discussion with ID, due to persistent bacteremia she feels that this would be to high risk. Patient should remain in hospital until can be transferred to [facility] for ICD removal.           Cardiac meds: asa; lipitor 20; toprol Xl 12.5; coumadin. Receiving Tx today. For other plans, see orders.   Hospital problem list     Active Hospital Problems    Diagnosis Date Noted    Leucocytosis 2021    Gram-positive bacteremia 2021    ESRD (end stage renal disease) on dialysis ([facility] [address] 2013        Subjective: Ririthomas Concetta reports       Chest pain X none  consistent with:  Non-cardiac CP         Atypical CP     None now  On going  Anginal CP     Dyspnea X none  at rest  with exertion         improved  unchanged  worse              PND X none  overnight       Orthopnea X none  improved  unchanged  worse   Presyncope X none  improved  unchanged  worse     Ambulated in hallway without symptoms   Yes   Ambulated in room without symptoms  Yes   Objective:     Physical Exam:  Overall VSSAF;    Visit Vitals  /70   Pulse 88   Temp 98.3 °F (36.8 °C)   Resp 17   Ht 5' 7\" (1.702 m)   Wt 66.3 kg (146 lb 2.6 oz)   SpO2 100%   BMI 22.89 kg/m²     Temp (24hrs), Av.3 °F (36.8 °C), Min:97.6 °F (36.4 °C), Max:99.4 °F (37.4 °C)    Patient Vitals for the past 8 hrs:   Pulse   04/06/21 1219 88   04/06/21 0820 82     Patient Vitals for the past 8 hrs:   Resp   04/06/21 1219 17   04/06/21 0820 17     Patient Vitals for the past 8 hrs:   BP   04/06/21 1219 124/70   04/06/21 0820 113/75       Intake/Output Summary (Last 24 hours) at 4/6/2021 1405  Last data filed at 4/6/2021 1243  Gross per 24 hour   Intake 1910 ml   Output 0 ml   Net 1910 ml     General Appearance: Well developed, well nourished, no acute distress. Ears/Nose/Mouth/Throat:   Normal MM; anicteric. JVP: WNL   Resp:   Few basal crackles. Nl resp effort. Cardiovascular:  RRR, S1, S2 normal, no new murmur. No gallop or rub. Abdomen:   Soft, non-tender, bowel sounds are present. Extremities: Mild edema bilaterally. Skin:  Neuro: Warm and dry. A/O x3, grossly nonfocal       cath site intact w/o hematoma or new bruit; distal pulse unchanged  Yes   Data Review:     Telemetry independently reviewed x sinus  chronic afib  parox afib  NSVT     ECG independently reviewed  NSR  afib  no significant changes  NSST-Tw chgs     x no new ECG provided for review   Lab results reviewed as noted below. Current medications reviewed as noted below. No results for input(s): PH, PCO2, PO2 in the last 72 hours. No results for input(s): CPK, CKMB, CKNDX, TROIQ in the last 72 hours.   Recent Labs     04/06/21  0312 04/05/21  0317 04/04/21  2351 04/04/21  0429   * 129* 128*  --    K 4.9 7.1* 6.7*  --    CL 99 93* 95*  --    CO2 24 21 22  --    BUN 31* 53* 48*  --    CREA 5.82* 8.80* 8.40*  --    GFRAA 13* 8* 8*  --    GLU 96 82 86  --    PHOS 4.3 5.1*  --   --    CA 9.1 8.9 9.2  --    WBC 12.5* 17.9*  --  17.1*   HGB 7.9* 7.0*  --  7.3*   HCT 24.5* 22.2*  --  22.5*    244  --  258     Lab Results   Component Value Date/Time    Cholesterol, total 139 05/22/2012 05:00 PM    HDL Cholesterol 32 05/22/2012 05:00 PM    LDL, calculated 75.8 05/22/2012 05:00 PM    Triglyceride 156 (H) 05/22/2012 05:00 PM    CHOL/HDL Ratio 4.3 05/22/2012 05:00 PM     No results for input(s): AP, TBIL, TP, ALB, GLOB, GGT, AML, LPSE in the last 72 hours.     No lab exists for component: SGOT, GPT, AMYP, HLPSE  Recent Labs     04/06/21  0312 04/05/21  0317 04/04/21  0429   INR 1.8* 1.7* 1.5*   PTP 18.8* 17.1* 15.0*      No components found for: GLPOC    Current Facility-Administered Medications   Medication Dose Route Frequency    warfarin (COUMADIN) tablet 2.5 mg  2.5 mg Oral ONCE    0.9% sodium chloride infusion 250 mL  250 mL IntraVENous PRN    bisacodyL (DULCOLAX) tablet 5 mg  5 mg Oral DAILY    polyethylene glycol (MIRALAX) packet 34 g  34 g Oral DAILY    melatonin tablet 3 mg  3 mg Oral QHS PRN    WARFARIN INFORMATION NOTE (COUMADIN)   Other QPM    Vancomycin - Pharmacy to dose   Other Rx Dosing/Monitoring    atorvastatin (LIPITOR) tablet 20 mg  20 mg Oral DAILY    aspirin chewable tablet 81 mg  81 mg Oral DAILY    metoprolol succinate (TOPROL-XL) XL tablet 12.5 mg  12.5 mg Oral DAILY    pantoprazole (PROTONIX) tablet 40 mg  40 mg Oral ACB    calcitRIOL (ROCALTROL) capsule 0.5 mcg  0.5 mcg Oral DAILY    sodium chloride (NS) flush 5-40 mL  5-40 mL IntraVENous Q8H    sodium chloride (NS) flush 5-40 mL  5-40 mL IntraVENous PRN    acetaminophen (TYLENOL) tablet 650 mg  650 mg Oral Q6H PRN    Or    acetaminophen (TYLENOL) suppository 650 mg  650 mg Rectal Q6H PRN    ondansetron (ZOFRAN ODT) tablet 4 mg  4 mg Oral Q8H PRN    Or    ondansetron (ZOFRAN) injection 4 mg  4 mg IntraVENous Q6H PRN    calcium acetate(phosphat bind) (PHOSLO) capsule 1,334 mg  2 Cap Oral TID WITH MEALS    epoetin emilie-epbx (RETACRIT) 12,000 Units combo injection  12,000 Units SubCUTAneous Q MON, WED & Amos Early MD

## 2021-04-06 NOTE — PROGRESS NOTES
Problem: Falls - Risk of  Goal: *Absence of Falls  Description: Document Ryan Maple Fall Risk and appropriate interventions in the flowsheet.   Outcome: Progressing Towards Goal  Note: Fall Risk Interventions:            Medication Interventions: Teach patient to arise slowly                   Problem: Patient Education: Go to Patient Education Activity  Goal: Patient/Family Education  Outcome: Progressing Towards Goal     Problem: Pain  Goal: *Control of Pain  Outcome: Progressing Towards Goal

## 2021-04-06 NOTE — PROGRESS NOTES
4:15PM:  IV Vancomycin orders and recent HD treatment flow sheets faxed to [facility] with [facility] location. 9:30AM:  Care manager went in and spoke with patient regarding his options for receiving IV antibiotics with his hemodialysis. Advised patient of the 2 options and patient has chosen to continue his home HD 5 days a week and that on MWF he will be complete with his home hemo in order to arrive at [facility] on [place] [phone] by 1:30PM to receive his IV antibioitcs. Per Portia at [facility] in [place], they need an order for the IV Vanc and the labs that will need to be drawn.   This information will need to be faxed to [facility] at fax [phone]    CM will await the orders needed and fax when available     Transition of Care Plan:     RUR:   31   %     Disposition:  TBD: possibly home with sister who is his caregiver; currently patient and sister who is also his caregiver and assistant with his home hemodialysis reside in an apartment together. Ron Bustillo has been independent with his activities of daily living including his home hemodialysis and he is assisted with his home hemodialysis through [facility] on [address].      Follow up appointments:  Will be needed - PCP; Renal; cardiology; Vascular;  ID?     DME needed:  TBD; at this time patient has all of his home hemodialysis equipment and supplies and he gets continued shipments that are arranged through [facility] on [address] [phone]; patient has a blood pressure monitoring unit.      Preferred Rx is [facility] on [address] and [address].     Transportation at Discharge: his car is here as he drove himself here the day of his admission     Keys or means to access home:  Both patient and sister Cate Romero [phone] has keys to the apt,        IM Medicare letter:   2nd IM Letter will be needed prior to discharge     Caregiver Contact:  Sister, Cate Romero [phone], was contacted today by CM.  She provided as much information as she could     Discharge Caregiver contacted prior to discharge?   Sister, Claire Doctor [phone] will be contacted closer to discharge    Jo Ann Noriega, [facility]  Ext [phone]

## 2021-04-06 NOTE — PROGRESS NOTES
End of Shift Note    Bedside shift change report given to Marialuisa Lopez (oncoming nurse) by Maceo Koyanagi (offgoing nurse). Report included the following information SBAR    Shift worked:  1812-5199     Shift summary and any significant changes:     Pt did not have any significant changes during shift. Pt did not have any complaints of pain. Pt vitals did not have any significant changes during shift. Pt HGB this morning 7.9 and potassium 4.9. Pt up ad fouzia to bathroom and in hallway throughout night. Pt rested quietly throughout night. Concerns for physician to address:       Zone phone for oncoming shift:   [phone]       Activity:  Activity Level: Up ad fouzia  Number times ambulated in hallways past shift: 5  Number of times OOB to chair past shift: 2    Cardiac:   Cardiac Monitoring: Yes      Cardiac Rhythm: Normal sinus rhythm    Access:   Current line(s): PIV     Genitourinary:   Urinary status: anuric    Respiratory:   O2 Device: Room air  Chronic home O2 use?: NO     GI:  Last Bowel Movement Date: 04/04/21  Current diet:  DIET RENAL Regular  DIET ONE TIME MESSAGE  Passing flatus: YES  Tolerating current diet: YES  % Diet Eaten: 100 %    Pain Management:   Patient states pain is manageable on current regimen: YES    Skin:  Yong Score: 22  Interventions: increase time out of bed    Patient Safety:  Fall Score:  Total Score: 1  Interventions: gripper socks  High Fall Risk: Yes    Length of Stay:  Expected LOS: 4d 19h  Actual LOS: [address] [place]

## 2021-04-06 NOTE — PROGRESS NOTES
Vascular Surgery Progress Note  Sharath  ACNP-BC  4/6/2021       Subjective:     Ehsan Gross is a 39 y.o.  with a hx of ASHD, AICD x2, HTN, HLD, ESRF, Recurrent DVT s/p IVC, GERD, and SBO. He is admitted to the hospital with sepsis secondary to bacteremia. He has a hx of endocarditis and was diagnosed with MRSE bacteremia 11/2020 due to a femoral dialysis catheter. We were to evaluate for AVG infection. He is s/p creation of a RUE bovine AVG (now w/o flow) & LUE [place] AVG 9/2020. He underwent exploration of the LUE AVG on 04/02. Surgical cx x2 are pending.       Nursing Data:     Patient Vitals for the past 24 hrs:   BP Temp Temp src Pulse Resp SpO2 Weight   04/06/21 0820 113/75 98.1 °F (36.7 °C)  82 17 100 %    04/06/21 0458       66.3 kg (146 lb 2.6 oz)   04/06/21 0250 102/72 97.6 °F (36.4 °C)  82 18 100 %    04/05/21 2338 115/69 98.3 °F (36.8 °C)  89 18 97 %    04/05/21 2247 110/69 97.9 °F (36.6 °C)  89 18 95 %    04/05/21 1950 130/70 98.4 °F (36.9 °C)  87 19 94 %    04/05/21 1447 (!) 156/80 99.4 °F (37.4 °C)  94 18 96 %    04/05/21 1240 124/80 98.5 °F (36.9 °C)  82 20 99 %    04/05/21 1143 124/71 98.2 °F (36.8 °C) Oral 79 18     04/05/21 1130 118/68   81 20     04/05/21 1115 114/78   82 18     04/05/21 1100 116/66 98.5 °F (36.9 °C)  79 20     04/05/21 1045 119/73   84 18     04/05/21 1030 113/70   84 18     04/05/21 1015 111/73 98.3 °F (36.8 °C)  87 18     04/05/21 1000 112/68   85 18     04/05/21 0945 99/63 98.5 °F (36.9 °C)  88 20     04/05/21 0930 101/65 98.3 °F (36.8 °C)  92 18     04/05/21 0916    (!) 106 18 100 %    04/05/21 0915  98.5 °F (36.9 °C)        04/05/21 0913    99      04/05/21 0900 (!) 123/59   98 18 100 %    04/05/21 0845 104/63   89 18 98 %    04/05/21 0830 104/63   95 20         Intake/Output Summary (Last 24 hours) at 4/6/2021 0827  Last data filed at 4/6/2021 0521  Gross per 24 hour   Intake 1690 ml   Output 3000 ml   Net -1310 ml       Exam:     Constititional:     Alert and oriented. He is ambulating independently   HENT:      Head: Normocephalic. Nose: Nose normal.      Mouth/Throat:      Mouth: Mucous membranes are moist.      Moon face   Eyes:      Pupils: Pupils are equal, round, and reactive to light. Neck:      Musculoskeletal: Normal range of motion. Cardiovascular:      Rate and Rhythm: RRR. Comments: LUE AVG with palpable thrill. Dressing is intact. Left foot warm and perfused. Chronic LLE 2+ edema. Pulmonary:      Effort: Pulmonary effort is normal. No respiratory distress. Abdominal:      General: Abdomen is flat. There is no distension. Musculoskeletal: Normal range of motion. Skin:     General: Skin is warm. Neurological:      General: No focal deficit present. Mental Status: He is alert. Mental status is at baseline. Psychiatric:         Mood and Affect: Mood normal.         Behavior: Behavior normal.     Lab Review:     . Recent Results (from the past 24 hour(s))   RBC, ALLOCATE    Collection Time: 04/05/21  8:45 AM   Result Value Ref Range    HISTORY CHECKED?  Historical check performed    PROTHROMBIN TIME + INR    Collection Time: 04/06/21  3:12 AM   Result Value Ref Range    INR 1.8 (H) 0.9 - 1.1      Prothrombin time 18.8 (H) 9.0 - 65.3 sec   METABOLIC PANEL, BASIC    Collection Time: 04/06/21  3:12 AM   Result Value Ref Range    Sodium 132 (L) 136 - 145 mmol/L    Potassium 4.9 3.5 - 5.1 mmol/L    Chloride 99 97 - 108 mmol/L    CO2 24 21 - 32 mmol/L    Anion gap 9 5 - 15 mmol/L    Glucose 96 65 - 100 mg/dL    BUN 31 (H) 6 - 20 MG/DL    Creatinine 5.82 (H) 0.70 - 1.30 MG/DL    BUN/Creatinine ratio 5 (L) 12 - 20      GFR est AA 13 (L) >60 ml/min/1.73m2    GFR est non-AA 11 (L) >60 ml/min/1.73m2    Calcium 9.1 8.5 - 10.1 MG/DL   PHOSPHORUS    Collection Time: 04/06/21  3:12 AM   Result Value Ref Range    Phosphorus 4.3 2.6 - 4.7 MG/DL   MAGNESIUM    Collection Time: 04/06/21  3:12 AM   Result Value Ref Range    Magnesium 2.6 (H) 1.6 - 2.4 mg/dL   CBC WITH AUTOMATED DIFF    Collection Time: 04/06/21  3:12 AM   Result Value Ref Range    WBC 12.5 (H) 4.1 - 11.1 K/uL    RBC 2.56 (L) 4.10 - 5.70 M/uL    HGB 7.9 (L) 12.1 - 17.0 g/dL    HCT 24.5 (L) 36.6 - 50.3 %    MCV 95.7 80.0 - 99.0 FL    MCH 30.9 26.0 - 34.0 PG    MCHC 32.2 30.0 - 36.5 g/dL    RDW 17.0 (H) 11.5 - 14.5 %    PLATELET 851 076 - 596 K/uL    MPV 10.5 8.9 - 12.9 FL    NRBC 0.0 0  WBC    ABSOLUTE NRBC 0.00 0.00 - 0.01 K/uL    NEUTROPHILS 74 32 - 75 %    LYMPHOCYTES 10 (L) 12 - 49 %    MONOCYTES 11 5 - 13 %    EOSINOPHILS 4 0 - 7 %    BASOPHILS 1 0 - 1 %    IMMATURE GRANULOCYTES 0 0.0 - 0.5 %    ABS. NEUTROPHILS 9.2 (H) 1.8 - 8.0 K/UL    ABS. LYMPHOCYTES 1.2 0.8 - 3.5 K/UL    ABS. MONOCYTES 1.4 (H) 0.0 - 1.0 K/UL    ABS. EOSINOPHILS 0.5 (H) 0.0 - 0.4 K/UL    ABS. BASOPHILS 0.1 0.0 - 0.1 K/UL    ABS. IMM. GRANS. 0.1 (H) 0.00 - 0.04 K/UL    DF AUTOMATED            Assessment/Plan:      Sepsis  -leukocytosis trending down   -HR, RR, and BP normal.  He is afebrile. Persistent MSSA Bacteremia   -blood cx 3/27 STAPHYLOCOCCUS EPIDERMIDIS 4/4  -repeat blood cx 3/29 STAPHYLOCOCCUS EPIDERMIDIS (OXACILLIN RESISTANT) 4/4  -repeat blood cx 3/30 co-ag neg STAPH 1/2 BTL  -repeat blood cx 04/01 STAPHYLOCOCCUS EPIDERMIDIS 2/4 BTL   Hx of Endocarditis    -\"EVER neg for overt endocarditis\"  -Hx of MRSE bacteremia 11/2020 (right femoral catheter removed)  Hx of AICD x2 (initial system turned off due to lead fx & subcutaneous [employer] ICD implanted at [facility]). Hx of LUE [place] AVG  -Hx of RUE bovine AVG (no flow)  Hx of IVC filter  -Cardiology consulted  Complication of HD access  -s/p exploration of AVG 04/02  -surgical wound cx NGTD x 3 days   · IV antibxs per infectious disease   · Will continue to follow wound cxs for an additional two days. PAD  -ABIs NC bilaterally.   Right TBI 0.51 and left TBI 0.0 (Left ankle PVR waveforms appear within normal limits & absent left great toe PPG signal). Left foot is warm and perfused w/o CLI. LLE edema. -left femoral to popliteal vein bypass. Duplex reveals a patent bypass  -ASA and statin    · Routine outpatient f/u in 6 months     ESRF  -s/p renal cell transplant x2 (1992 & 2001)  -home HD 5 days per week [facility]  Hypoantremia  Hypermagnesemia   Hyperkalemia   -resolved  Anemia of chronic renal disease   -Retacrit  -labile   · Plan per nephrology     HTN  -labile   HLD  ASHD  -remote hx of stenting   -ASA, BBlocker, & statin   Ischemic Cardiomyopathy  Chronic systolic CHF   -EF 23-67%    -currently compensated   AICD  -#2 ICD present     Seizure disorder     GERD  -PPI  Constipation  -BM regimen      VTE Prophylaxis:  Recurrent DVT  -s/p IVC  -warfarin   Hx of H. I.T  Warfarin subtherapeutic   SCDs contraindicated in PAD  Encourage OOB       Disposition:  Likely home

## 2021-04-06 NOTE — PROGRESS NOTES
End of Shift Note    Bedside shift change report given to Jorge Davison RN (oncoming nurse) by Abrahan Cardona LPN (offgoing nurse). Report included the following information SBAR, Kardex, ED Summary, OR Summary, Procedure Summary, Intake/Output, MAR and Recent Results    Shift worked:  7AM-7PM     Shift summary and any significant changes:     More alert/oriented today     Concerns for physician to address:  Discharge plan     Zone phone for oncoming shift:   [phone]       Activity:  Activity Level: Up ad fouzia  Number times ambulated in hallways past shift: 0  Number of times OOB to chair past shift: 4    Cardiac:   Cardiac Monitoring: YES     Cardiac Rhythm: Normal sinus rhythm    Access:   Current line(s): PIV and HD access     Genitourinary:   Urinary status: anuric    Respiratory:   O2 Device: Room air  Chronic home O2 use?: NO  Incentive spirometer at bedside: YES     GI:  Last Bowel Movement Date: 04/06/21  Current diet:  DIET RENAL Regular; Double Portions  Passing flatus: YES  Tolerating current diet: YES  % Diet Eaten: 80 %    Pain Management:   Patient states pain is manageable on current regimen: YES    Skin:  Yong Score: 22  Interventions: float heels, increase time out of bed and nutritional support     Patient Safety:  Fall Score:  Total Score: 0  Interventions: bed/chair alarm, gripper socks and pt to call before getting OOB  High Fall Risk: Yes    Length of Stay:  Expected LOS: 4d 19h  Actual LOS: [address], LPN

## 2021-04-06 NOTE — PROGRESS NOTES
Comprehensive Nutrition Assessment    Type and Reason for Visit: Initial, RD nutrition re-screen/LOS    Nutrition Recommendations/Plan:   Continue Renal diet, RD to add Double Meat/Protein portions    Nutrition Assessment:   Pt admitted with ESRD and bacteremia. PMH: ESRD (HD), HTN, CAD, GERD. Chart reviewed for LOS. MST not filled out. Appetite has been very good since admission per RN flowsheet's. Noted plans for HD tomorrow. K 4.9 (was 7.1 yesterday!) and phos 4.3 (was 5.1). Noted one time message asking for additional scrambled eggs. Current diet is likely inadequate to meet est protein needs which are elevated 2' HD. Will add double meat/protein portions. Awaiting disposition. Patient Vitals for the past 72 hrs:   % Diet Eaten   04/06/21 1243 90 %   04/06/21 0910 90 %   04/05/21 1754 100 %   04/05/21 1258 30 %   04/05/21 0900 0 %   04/04/21 1747 100 %   04/04/21 1150 100 %   04/04/21 1019 100 %       Estimated Daily Nutrient Needs:  Energy (kcal): MSJ 2000 (1517 x 1.3); Weight Used for Energy Requirements: Current  Protein (g): 80-93g (1.2-1.4gPro/kg); Weight Used for Protein Requirements: Current  Fluid (ml/day): 1600mL; Method Used for Fluid Requirements: Standard renal      Nutrition Related Findings:  Meds: dulcolax, calitrol, phoslo, protonix, miralax, coumadin. Edema: +2-BLE. BM 4/6      Wounds:    Surgical incision       Current Nutrition Therapies:  DIET RENAL Regular; Double Portions    Anthropometric Measures:  · Height:  5' 7\" (170.2 cm)  · Current Body Wt:  66.3 kg (146 lb 2.6 oz)   · Ideal Body Wt:  148 lbs:  98.8 %   · BMI Category:  Normal weight (BMI 18.5-24. 9)       Nutrition Diagnosis:   · Increased nutrient needs related to renal dysfunction as evidenced by other (specify)(est protein needs 80-93g daily.)      Nutrition Interventions:   Food and/or Nutrient Delivery: Continue current diet, Modify current diet  Nutrition Education and Counseling: No recommendations at this time  Coordination of Nutrition Care: Continue to monitor while inpatient    Goals:  Pt will consume >70% of meals in 4-6 days.        Nutrition Monitoring and Evaluation:   Behavioral-Environmental Outcomes: None identified  Food/Nutrient Intake Outcomes: Food and nutrient intake  Physical Signs/Symptoms Outcomes: Biochemical data, Nutrition focused physical findings, Skin, Weight, Fluid status or edema    Discharge Planning:    Continue current diet     Electronically signed by Vilma Kocher, RD, [address]  on 4/6/2021 at 3:43 PM    Contact: [id number]

## 2021-04-06 NOTE — PROGRESS NOTES
+.      Hospitalist Progress Note    NAME: Raheel Paiz   :  1975   MRN:  [id number]       Assessment / Plan:  Gram-positive bacteremia (methicillin-resistant staph epidermidis) in 4 bottles 3/27  Had bacteremia in 2020, had femoral dialysis catheter for 2 months at the time (was tunneled catheter)  History of endocarditis  Transthoracic echocardiography with mobile structure in the left ventricle, ejection fraction is 30%mitral regurg  Coronary artery disease   ischemic cardiomyopathy with AICD  CT scan of the lumbar spine is not showing signs of osteomyelitis  Vascular surgery consulted mild fluid around AV graft. Planning exploration tomorrow . Cardiology were consulted and they are looking into the old ICD and leads. EVER 3/30 no signs of endocarditis  3/30 cultures still with 1/2 growing GPC. Culture repeat ordered  discussed case with infection disease Dr. Deni Perry     going for graft removal today. discussed with Dr. Carmencita Maldonado for culture of blood after graft removal. Will need permicath insertion (after documenting bacteremia clearance)  Hgb less than 7 (?dialuted sample) was ordered a unit of blood. No signs of bleeding. HH and culture post OP were sent  Operative report with no suspicion of infection. There was complication with some clotting intraoperatively \"   AVG was so incorporated that a graftotomy was made. The graft was clamped and this was repaired w/ a single suture but the graft clotted. It was difficult to declot the graft w/o heparin. Angiomax was used but this was not clearly effective as he kept clotting. AVG was angioplastied w/ a 7x10 balloon. Good flow on completion. Greater than average EBL due to need for repeated thrombectomy. \" received a unit of blood pre and one post operatively. Perigraft fluids sent for cultures will follow    4/3 no events   no events, leukocytic count elevated today, ?   Postoperative demargination, will follow     hyperkalemic today undergoing dialysis will receive 1 unit of packed RBCs as hemoglobin 7.1 only. Patient was arranged to be transferred to [facility] cardiology [address] I have communicated with [facility] but fortunately right now they are at capacity have discussed this case with Dr. Elian Aldridge and possibly continuing with the suppressive IV vancomycin until the patient might be able to be admitted at [facility] electively for the ICD removal which he thinks is a reasonable option I have discussed that with the case management so that infusion of antibiotics would be arranged and outpatient follow-up with Dr. Caterina Morris soon after discharge will be ensured. Patient is also lethargic today and I am going to discontinue the IV morphine that he had been receiving since the vascular surgery exploration, will be discontinuing Percocet as well. Patient continues to complain of inability to sleep during the night and always appears drowsy during the day. 4/6 no new events. Awaiting arrangement for infusion therapy. Discussed with CM  Discussed the case with Dr. Nathalie Morris and he had a discussion with infectious disease and he would like to keep the patient as inpatient with concerns of his high-grade bacteremia that has not cleared yet we will keep him inpatient for continued IV antibiotics until a bed becomes available at [facility]. I communicated with their transfer center again today and he is still in the que. Constipation  Add regular bisacodyl and miralax    End-stage renal disease  Nephrology following          Hypertension   Continue metoprolol      History of DVT and graft thrombosis on warfarin  History of HIT  Continue warfarin    ICMP EF 30-35% s/p ICD placement  continue aspirin, statin and bblocker      Hyperlipidemia  Continue statin    GERD  Continue pantoprazole    Insomnia  PRN melatonin    Gout      18.5 - 24.9 Normal weight / Body mass index is 22.89 kg/m².     Code status: Full  Prophylaxis: Coumadin  Recommended Disposition: Home w/Family     Subjective:     Chief Complaint / Reason for Physician Visit   To follow-up with Mr. Maximo Castellanos with gram-positive bacteremia (MSSE recurrent) with history ESRD dialysis via left AV graft, ischemic cardiopathy s/p ICD, HLD, gout. Today felling ok, voicing no complaints. No sleeping problems. discussed again with him the plan for transfer. Discussed with RN events overnight. Review of Systems:  Symptom Y/N Comments  Symptom Y/N Comments   Fever/Chills n   Chest Pain n    Poor Appetite    Edema     Cough n   Abdominal Pain n    Sputum n   Joint Pain     SOB/ZAMUDIO n   Pruritis/Rash     Nausea/vomit n   Tolerating PT/OT     Diarrhea n   Tolerating Diet     Constipation    Other       Could NOT obtain due to:      Objective:     VITALS:   Last 24hrs VS reviewed since prior progress note. Most recent are:  Patient Vitals for the past 24 hrs:   Temp Pulse Resp BP SpO2   04/06/21 0820 98.1 °F (36.7 °C) 82 17 113/75 100 %   04/06/21 0250 97.6 °F (36.4 °C) 82 18 102/72 100 %   04/05/21 2338 98.3 °F (36.8 °C) 89 18 115/69 97 %   04/05/21 2247 97.9 °F (36.6 °C) 89 18 110/69 95 %   04/05/21 1950 98.4 °F (36.9 °C) 87 19 130/70 94 %   04/05/21 1447 99.4 °F (37.4 °C) 94 18 (!) 156/80 96 %   04/05/21 1240 98.5 °F (36.9 °C) 82 20 124/80 99 %       Intake/Output Summary (Last 24 hours) at 4/6/2021 1158  Last data filed at 4/6/2021 4894  Gross per 24 hour   Intake 1690 ml   Output 0 ml   Net 1690 ml        I had a face to face encounter and independently examined this patient on 4/6/2021, as outlined below:  PHYSICAL EXAM:  General: WD, WN. Alert, cooperative, no acute distress    EENT:  EOMI. Anicteric sclerae. MMM  Resp:  CTA bilaterally, no wheezing or rales. No accessory muscle use  CV:  Regular  rhythm,  No edema  GI:  Soft, Non distended, Non tender. +Bowel sounds  Neurologic:  Alert and oriented X 3, normal speech,   Psych:   Good insight.  Not anxious nor agitated  Skin:  No rashes. No jaundice    Reviewed most current lab test results and cultures  YES  Reviewed most current radiology test results   YES  Review and summation of old records today    NO  Reviewed patient's current orders and MAR    YES  PMH/SH reviewed - no change compared to H&P  ________________________________________________________________________  Care Plan discussed with:    Comments   Patient X    Family      RN X    Care Manager X    Consultant                        Multidiciplinary team rounds were held today with , nursing, pharmacist and clinical coordinator. Patient's plan of care was discussed; medications were reviewed and discharge planning was addressed. ________________________________________________________________________  Total NON critical care TIME:  26  Minutes    Total CRITICAL CARE TIME Spent:   Minutes non procedure based      Comments   >50% of visit spent in counseling and coordination of care X    ________________________________________________________________________  Ferny Jarvis MD     Procedures: see electronic medical records for all procedures/Xrays and details which were not copied into this note but were reviewed prior to creation of Plan. LABS:  I reviewed today's most current labs and imaging studies.   Pertinent labs include:  Recent Labs     04/06/21 0312 04/05/21 0317 04/04/21 0429   WBC 12.5* 17.9* 17.1*   HGB 7.9* 7.0* 7.3*   HCT 24.5* 22.2* 22.5*    244 258     Recent Labs     04/06/21 0312 04/05/21 0317 04/04/21  9521 04/04/21 0429   * 129* 128*  --    K 4.9 7.1* 6.7*  --    CL 99 93* 95*  --    CO2 24 21 22  --    GLU 96 82 86  --    BUN 31* 53* 48*  --    CREA 5.82* 8.80* 8.40*  --    CA 9.1 8.9 9.2  --    MG 2.6* 2.3  --   --    PHOS 4.3 5.1*  --   --    INR 1.8* 1.7*  --  1.5*       Signed: Ferny Jarvis MD

## 2021-04-07 LAB
ANION GAP SERPL CALC-SCNC: 9 MMOL/L (ref 5–15)
BACTERIA SPEC CULT: ABNORMAL
BUN SERPL-MCNC: 41 MG/DL (ref 6–20)
BUN/CREAT SERPL: 5 (ref 12–20)
CALCIUM SERPL-MCNC: 9.3 MG/DL (ref 8.5–10.1)
CHLORIDE SERPL-SCNC: 99 MMOL/L (ref 97–108)
CO2 SERPL-SCNC: 25 MMOL/L (ref 21–32)
CREAT SERPL-MCNC: 7.88 MG/DL (ref 0.7–1.3)
ERYTHROCYTE [DISTWIDTH] IN BLOOD BY AUTOMATED COUNT: 16.9 % (ref 11.5–14.5)
GLUCOSE SERPL-MCNC: 92 MG/DL (ref 65–100)
HCT VFR BLD AUTO: 25.3 % (ref 36.6–50.3)
HGB BLD-MCNC: 8.1 G/DL (ref 12.1–17)
INR PPP: 2.1 (ref 0.9–1.1)
MCH RBC QN AUTO: 30.6 PG (ref 26–34)
MCHC RBC AUTO-ENTMCNC: 32 G/DL (ref 30–36.5)
MCV RBC AUTO: 95.5 FL (ref 80–99)
NRBC # BLD: 0 K/UL (ref 0–0.01)
NRBC BLD-RTO: 0 PER 100 WBC
PLATELET # BLD AUTO: 252 K/UL (ref 150–400)
PMV BLD AUTO: 10.3 FL (ref 8.9–12.9)
POTASSIUM SERPL-SCNC: 5.5 MMOL/L (ref 3.5–5.1)
PROTHROMBIN TIME: 21.7 SEC (ref 9–11.1)
RBC # BLD AUTO: 2.65 M/UL (ref 4.1–5.7)
SERVICE CMNT-IMP: ABNORMAL
SODIUM SERPL-SCNC: 133 MMOL/L (ref 136–145)
WBC # BLD AUTO: 13.1 K/UL (ref 4.1–11.1)

## 2021-04-07 PROCEDURE — 85027 COMPLETE CBC AUTOMATED: CPT

## 2021-04-07 PROCEDURE — 74011250636 HC RX REV CODE- 250/636: Performed by: INTERNAL MEDICINE

## 2021-04-07 PROCEDURE — 99222 1ST HOSP IP/OBS MODERATE 55: CPT | Performed by: STUDENT IN AN ORGANIZED HEALTH CARE EDUCATION/TRAINING PROGRAM

## 2021-04-07 PROCEDURE — 36415 COLL VENOUS BLD VENIPUNCTURE: CPT

## 2021-04-07 PROCEDURE — 85610 PROTHROMBIN TIME: CPT

## 2021-04-07 PROCEDURE — 74011250636 HC RX REV CODE- 250/636: Performed by: EMERGENCY MEDICINE

## 2021-04-07 PROCEDURE — 90935 HEMODIALYSIS ONE EVALUATION: CPT

## 2021-04-07 PROCEDURE — 74011250637 HC RX REV CODE- 250/637: Performed by: STUDENT IN AN ORGANIZED HEALTH CARE EDUCATION/TRAINING PROGRAM

## 2021-04-07 PROCEDURE — 65270000029 HC RM PRIVATE

## 2021-04-07 PROCEDURE — 80048 BASIC METABOLIC PNL TOTAL CA: CPT

## 2021-04-07 RX ORDER — WARFARIN 2.5 MG/1
2.5 TABLET ORAL ONCE
Status: COMPLETED | OUTPATIENT
Start: 2021-04-07 | End: 2021-04-07

## 2021-04-07 RX ORDER — OXYCODONE AND ACETAMINOPHEN 5; 325 MG/1; MG/1
1 TABLET ORAL
Status: DISCONTINUED | OUTPATIENT
Start: 2021-04-07 | End: 2021-04-10 | Stop reason: HOSPADM

## 2021-04-07 RX ADMIN — VANCOMYCIN HYDROCHLORIDE 750 MG: 750 INJECTION, POWDER, LYOPHILIZED, FOR SOLUTION INTRAVENOUS at 16:45

## 2021-04-07 RX ADMIN — Medication 10 ML: at 13:11

## 2021-04-07 RX ADMIN — OXYCODONE HYDROCHLORIDE AND ACETAMINOPHEN 1 TABLET: 5; 325 TABLET ORAL at 14:23

## 2021-04-07 RX ADMIN — Medication 10 ML: at 22:52

## 2021-04-07 RX ADMIN — ACETAMINOPHEN 650 MG: 325 TABLET, FILM COATED ORAL at 13:04

## 2021-04-07 RX ADMIN — OXYCODONE HYDROCHLORIDE AND ACETAMINOPHEN 1 TABLET: 5; 325 TABLET ORAL at 20:54

## 2021-04-07 RX ADMIN — METOPROLOL SUCCINATE 12.5 MG: 25 TABLET, EXTENDED RELEASE ORAL at 13:07

## 2021-04-07 RX ADMIN — ATORVASTATIN CALCIUM 20 MG: 20 TABLET, FILM COATED ORAL at 13:07

## 2021-04-07 RX ADMIN — CALCITRIOL CAPSULES 0.25 MCG 0.5 MCG: 0.25 CAPSULE ORAL at 13:07

## 2021-04-07 RX ADMIN — BISACODYL 5 MG: 5 TABLET, COATED ORAL at 13:07

## 2021-04-07 RX ADMIN — PANTOPRAZOLE SODIUM 40 MG: 40 TABLET, DELAYED RELEASE ORAL at 05:59

## 2021-04-07 RX ADMIN — ACETAMINOPHEN 650 MG: 325 TABLET, FILM COATED ORAL at 07:24

## 2021-04-07 RX ADMIN — Medication 10 ML: at 05:55

## 2021-04-07 RX ADMIN — ACETAMINOPHEN 650 MG: 325 TABLET, FILM COATED ORAL at 01:44

## 2021-04-07 RX ADMIN — CALCIUM ACETATE 1334 MG: 667 CAPSULE ORAL at 16:45

## 2021-04-07 RX ADMIN — WARFARIN SODIUM 2.5 MG: 2.5 TABLET ORAL at 17:17

## 2021-04-07 RX ADMIN — CALCIUM ACETATE 1334 MG: 667 CAPSULE ORAL at 13:06

## 2021-04-07 RX ADMIN — EPOETIN ALFA-EPBX 12000 UNITS: 10000 INJECTION, SOLUTION INTRAVENOUS; SUBCUTANEOUS at 22:50

## 2021-04-07 RX ADMIN — ASPIRIN 81 MG: 81 TABLET, CHEWABLE ORAL at 13:07

## 2021-04-07 NOTE — DISCHARGE INSTRUCTIONS
Discharge Instructions After Vascular Surgery    Home care  Check your incision every day for signs of infection such as swelling, redness, warmth, or drainage. Dont bathe or soak in a tub or go swimming until your incisions are well healed (usually at least 2 weeks). You can shower to keep your incisions clean. Just make sure you dry them well afterward. Dry dressing changes daily. Take your medicines exactly as directed. Dont skip doses. Follow-up  As need with Dr. Suamn Hammond. [place] can be removed from left arm on 04/16/2021. When to call your healthcare provider   Call your provider right away if you have any of the following:    Fever of 100.4°F (38°C)    Signs of infection (redness, swelling, or warmth at the incision site)    Drainage from your incision    Chest pain or trouble breathing    Long-term changes at home  Eat a healthy, low-fat, low cholesterol, and low calorie diet. Maintain your ideal body weight. After you have recovered from surgery, try to exercise more, especially walking. If you smoke, ask your primary doctor for help quitting.

## 2021-04-07 NOTE — PROGRESS NOTES
TRANSFER - IN REPORT:    Verbal report received from Per mosqueda RN on Raheel Paiz  being received from Gen-Surg for ordered procedure      Report consisted of patients Situation, Background, Assessment and   Recommendations(SBAR). Information from the following report(s) SBAR, Kardex and Cardiac Rhythm NSR was reviewed with the receiving nurse. Opportunity for questions and clarification was provided. Assessment completed upon patients arrival to unit and care assumed.

## 2021-04-07 NOTE — PROGRESS NOTES
Nephrology Progress Note  Blaise Leblanc     www. [facility] Service Management  Phone - [phone]   Patient: Ni Orona    YOB: 1975        Date- 4/7/2021   Admit Date: 3/29/2021  CC: Follow up for ESRD        IMPRESSION & PLAN:   · End-stage renal disease home HD 5 days per week- Follows up with Dr Rowdy Lazaro at [facility]  · Failed RTX times 2  · Hyperkalemia  · Gram positive sepsis  · Anemia of ckd  · Hx of renal tx in past times 2.  · Hypertension  · CAD  · H/o DVT, s/p ivc filter/ h/o HIT     Plan:  -Plan for dialysis today.  -Plan to continue Monday Wednesday Friday dialysis  -Recent cultures negative so far  -Vancomycin per ID.  -Ongoing work up for source of infection. -Appreciated Dr Rolando Santoyo input, so far cultures negative from AVG site so far. Did not looked infected. Subjective: Interval History:   -  Feels  Better today    Objective:   Vitals:    04/07/21 1100 04/07/21 1115 04/07/21 1120 04/07/21 1208   BP: 126/79 131/79 138/80 137/84   Pulse: 69 69 75 87   Resp: 16 16 16 17   Temp:   98 °F (36.7 °C) 98.1 °F (36.7 °C)   TempSrc:   Oral    SpO2:    100%   Weight:       Height:          04/06 0701 - 04/07 0700  In: 0186 [P.O.:1290]  Out: -   Last 3 Recorded Weights in this Encounter    04/05/21 0406 04/06/21 0458 04/07/21 0723   Weight: 68.5 kg (151 lb 0.2 oz) 66.3 kg (146 lb 2.6 oz) 66.8 kg (147 lb 4.3 oz)      Physical exam:   GEN: NAD  NECK- Supple, no mass  RESP: No wheezing, clear b/l  CVS: S1,S2  RRR  NEURO: Normal speech, nonfocal   EXT: No Edema   PSYCH: Normal Mood    Chart reviewed. Pertinent Notes reviewed.      Data Review :  Recent Labs     04/07/21  0148 04/06/21  0312 04/05/21  0317   * 132* 129*   K 5.5* 4.9 7.1*   CL 99 99 93*   CO2 25 24 21   BUN 41* 31* 53*   CREA 7.88* 5.82* 8.80*   GLU 92 96 82   CA 9.3 9.1 8.9   MG  --  2.6* 2.3   PHOS  --  4.3 5.1*     Recent Labs     04/07/21  0148 04/06/21 0312 04/05/21 0317   WBC 13.1* 12.5* 17.9*   HGB 8.1* 7.9* 7.0*   HCT 25.3* 24.5* 22.2*    224 244     No results for input(s): FE, TIBC, PSAT, FERR in the last 72 hours.    Medication list  reviewed  Current Facility-Administered Medications   Medication Dose Route Frequency    vancomycin (VANCOCIN) 750 mg in 0.9% sodium chloride 250 mL (VIAL-MATE)  750 mg IntraVENous ONCE    oxyCODONE-acetaminophen (PERCOCET) 5-325 mg per tablet 1 Tab  1 Tab Oral Q6H PRN    0.9% sodium chloride infusion 250 mL  250 mL IntraVENous PRN    bisacodyL (DULCOLAX) tablet 5 mg  5 mg Oral DAILY    polyethylene glycol (MIRALAX) packet 34 g  34 g Oral DAILY    melatonin tablet 3 mg  3 mg Oral QHS PRN    WARFARIN INFORMATION NOTE (COUMADIN)   Other QPM    Vancomycin - Pharmacy to dose   Other Rx Dosing/Monitoring    atorvastatin (LIPITOR) tablet 20 mg  20 mg Oral DAILY    aspirin chewable tablet 81 mg  81 mg Oral DAILY    metoprolol succinate (TOPROL-XL) XL tablet 12.5 mg  12.5 mg Oral DAILY    pantoprazole (PROTONIX) tablet 40 mg  40 mg Oral ACB    calcitRIOL (ROCALTROL) capsule 0.5 mcg  0.5 mcg Oral DAILY    sodium chloride (NS) flush 5-40 mL  5-40 mL IntraVENous Q8H    sodium chloride (NS) flush 5-40 mL  5-40 mL IntraVENous PRN    acetaminophen (TYLENOL) tablet 650 mg  650 mg Oral Q6H PRN    Or    acetaminophen (TYLENOL) suppository 650 mg  650 mg Rectal Q6H PRN    ondansetron (ZOFRAN ODT) tablet 4 mg  4 mg Oral Q8H PRN    Or    ondansetron (ZOFRAN) injection 4 mg  4 mg IntraVENous Q6H PRN    calcium acetate(phosphat bind) (PHOSLO) capsule 1,334 mg  2 Cap Oral TID WITH MEALS    epoetin emilie-epbx (RETACRIT) 12,000 Units combo injection  12,000 Units SubCUTAneous Q MON, WED & FRI          MD [facility]  [facility], [address]  [place], [address]  Phone - [phone]               Fax - [phone]

## 2021-04-07 NOTE — PROGRESS NOTES
End of Shift Note    Bedside shift change report given to Shakira SPEAR/ Blaise Wilburn (oncoming nurse) by Aleyda Ny RN (offgoing nurse). Report included the following information SBAR, Kardex, Intake/Output and MAR    Shift worked:  [id number]     Shift summary and any significant changes:     no significant changes. Concerns for physician to address:  none     Zone phone for oncoming shift:   [phone]       Activity:  Activity Level: Up ad fouzia  Number times ambulated in hallways past shift: 0  Number of times OOB to chair past shift: 1    Cardiac:   Cardiac Monitoring: Yes      Cardiac Rhythm: Normal sinus rhythm    Access:   Current line(s): PIV     Genitourinary:   Urinary status: anuric    Respiratory:   O2 Device: Room air  Chronic home O2 use?: N/A  Incentive spirometer at bedside: N/A     GI:  Last Bowel Movement Date: 04/06/21  Current diet:  DIET RENAL Regular; Double Portions  % Diet Eaten: 75 %    Pain Management:   Patient states pain is manageable on current regimen: YES    Skin:  Yong Score: 22  Interventions: increase time out of bed    Patient Safety:  Fall Score:  Total Score: 1  Interventions: pt to call before getting OOB  High Fall Risk: Yes    Length of Stay:  Expected LOS: 4d 19h  Actual LOS: [address] Javi Nagy RN

## 2021-04-07 NOTE — PROGRESS NOTES
Infectious Disease Progress Note         Interval:  NAEO    Subjective:   Feels OK. Reporting pain in the left neck region, slightly worse today. Does hurt when moves his neck. Otherwise no acute issues. Objective:    Vitals:   Patient Vitals for the past 24 hrs:   Temp Pulse Resp BP SpO2   04/07/21 1208 98.1 °F (36.7 °C) 87 17 137/84 100 %   04/07/21 1120 98 °F (36.7 °C) 75 16 138/80    04/07/21 1115  69 16 131/79    04/07/21 1100  69 16 126/79    04/07/21 1045  68 16 124/76    04/07/21 1030  68 16 124/76    04/07/21 1015  73 16 128/82    04/07/21 1000  70 16 110/72    04/07/21 0945  73 16 127/67    04/07/21 0930  73 16 119/83    04/07/21 0915  77 16 133/84    04/07/21 0900  79 16 131/81    04/07/21 0845  75 16 119/83    04/07/21 0830  75 16 113/78    04/07/21 0815  81 16 117/82    04/07/21 0800  79 16 123/83    04/07/21 0748 98.2 °F (36.8 °C) 82 16 (!) 142/90 100 %   04/07/21 0309 97.6 °F (36.4 °C) 78 17 115/74 99 %   04/06/21 2331 97.8 °F (36.6 °C) 77 17 115/70 100 %   04/06/21 1945 97.5 °F (36.4 °C) 78 17 110/75 100 %   04/06/21 1534 97.6 °F (36.4 °C) 78 17 117/84 99 %       Physical Exam:  ? GEN: NAD  ? HEENT: Normocephalic, atraumatic, PERRL, no scleral icterus, no visible inflammation at the left neck region. ? CV: S1, S2 heard regularly, no murmurs, + thrill at the AV graft left arm, pacemaker sites appear benign - both the left lateral chest and the anterior chest region. ? Lungs: Clear to auscultation bilaterally  ? Genitourinary: no oneal  ? Extremities: no edema  ? Neuro: Alert, oriented to time, place and situation, moves all extremities to commands, verbal   ? Skin: no rash, left chest subcutaneous-ICD site looks benign and no signs of inflammation or tenderness there. ? Psych: good affect, good eye contact, non tearful   ?  Lines: left arm AV graft         Labs:  Recent Results (from the past 24 hour(s))   PROTHROMBIN TIME + INR    Collection Time: 04/07/21  1:48 AM   Result Value Ref Range    INR 2.1 (H) 0.9 - 1.1      Prothrombin time 21.7 (H) 9.0 - 45.2 sec   METABOLIC PANEL, BASIC    Collection Time: 04/07/21  1:48 AM   Result Value Ref Range    Sodium 133 (L) 136 - 145 mmol/L    Potassium 5.5 (H) 3.5 - 5.1 mmol/L    Chloride 99 97 - 108 mmol/L    CO2 25 21 - 32 mmol/L    Anion gap 9 5 - 15 mmol/L    Glucose 92 65 - 100 mg/dL    BUN 41 (H) 6 - 20 MG/DL    Creatinine 7.88 (H) 0.70 - 1.30 MG/DL    BUN/Creatinine ratio 5 (L) 12 - 20      GFR est AA 9 (L) >60 ml/min/1.73m2    GFR est non-AA 8 (L) >60 ml/min/1.73m2    Calcium 9.3 8.5 - 10.1 MG/DL   CBC W/O DIFF    Collection Time: 04/07/21  1:48 AM   Result Value Ref Range    WBC 13.1 (H) 4.1 - 11.1 K/uL    RBC 2.65 (L) 4.10 - 5.70 M/uL    HGB 8.1 (L) 12.1 - 17.0 g/dL    HCT 25.3 (L) 36.6 - 50.3 %    MCV 95.5 80.0 - 99.0 FL    MCH 30.6 26.0 - 34.0 PG    MCHC 32.0 30.0 - 36.5 g/dL    RDW 16.9 (H) 11.5 - 14.5 %    PLATELET 765 523 - 508 K/uL    MPV 10.3 8.9 - 12.9 FL    NRBC 0.0 0  WBC    ABSOLUTE NRBC 0.00 0.00 - 0.01 K/uL           Micro:  3/27: Blood cultures 4/4 bottles with Staph epidermidis (Methicillin-R),  (Vanc ZECHARIAH 1ug/mL) , Willian Cov 2 negative   3/29: Blood cultures 4/4 bottles positive for MRSE  3/30: Blood cultures with MRSE  4/1: Blood cultures with MRSE (Vanc ZECHARIAH 1ug/mL)  4/2: AV graft fluid: No WBCs, no organisms on gram stain. Cultures negative so far. Assessment:  38 yo M with:        - Sepsis due to high-grade MRSE bacteremia this admission.   - CAD with MI with 2 AICD placements (2009 - non-functioning due to fractured lead, then a subcutaneous ICD placement in 2019): patient has both devices in him. 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 - S/p AV graft exploration on 4/2.  Operative findings not suggestive of infection, and the gram stain and cultures from the graft have been negative. - Nephrotic syndome s/p 2 renal transplants. Both transplants failed due to graft-medication non-compliance (last transplant in 2001). - ESRD on iHD, 5 times a week from home since 2012. In 09/2020 had left AV graft placed since complicated by clotting issues. -  MSSA bacteremia in 2017  - MRSE bacteremia in 10/2020 likely femoral HD line-related and possible L1-L2 discitis/osteomyelitis treated with 6 weeks of vancomycin. Since 11/2020, the graft is being used for dialysis.       Recommendations:  - Patient is still bacteremia due to MRSE. Will recommend against outpatient management for this. He should continue on vancomycin with HD with goal trough 15-20. ICD removal needs to happen sooner rather than later. The more this is delayed, the more there is an increase in morbidity/ This is an aggressive infection. At this time we are waiting for beds at [facility]. Discussed with Cardiology.    - No documented negative blood cultures here so far this admission (see micro above). Unless the ICD is removed, I doubt if clearance will be achieved. At this time, we won't get repeat blood cultures anymore as these are going to be positive. He needs source control with the ICD removal. If patient spikes, fever or decompensates hemodynamically, please obtain blood cultures. - Once ICD is removed, obtain blood cultures. Start of date of IV vancomycin will begin from date of first negative blood cultures taken AFTER the ICD removal. Duration will be atleast 6 weeks of IV vancomycin goal trough 15-20. Please note that patient should be be receiving any less than 6 weeks of vancomycin.     - No signs of infectious involvement of the subcutaneous-ICD clinically on exam. Since all the components of this device are subcutaneous and no exposure to vascular space, suspicion on infectious involvement is low right now and hence extraction is not indicated at this time.  Continue to monitor for signs of pocket infection for this device. - Patient can continue to follow [facility] ID or [facility] ID after discharge from [facility]. Can call [phone] for appointment. Will follow. Perfecrtserve with any Qs. Thank you for the opportunity to participate in the care of this patient. Please contact with questions or concerns.       Ana Lilia Art MD  Infectious Diseases

## 2021-04-07 NOTE — PROGRESS NOTES
As of 4/7, pt is awaiting transfer to [facility] for removal of ICD which cannot be done within the [facility] system. Hospitalist, Dr. Gina Montero, is communicating with transfer center [phone]) for bed availability. Transfer will be BLS ambo and will require EMTALA documentation.      Transition of Care Plan:     OHE:   15   %     Disposition:  TBD: possibly home with sister who is his caregiver; currently patient and sister who is also his caregiver and assistant with his home hemodialysis reside in an apartment together. Sourav Rodrigues has been independent with his activities of daily living including his home hemodialysis and he is assisted with his home hemodialysis through [facility] on [address].      Follow up appointments:  Will be needed - PCP; Renal; cardiology; Vascular;  ID?     DME needed:  TBD; at this time patient has all of his home hemodialysis equipment and supplies and he gets continued shipments that are arranged through [facility] on [address]; patient has a blood pressure monitoring unit.      Preferred Rx is [facility] [address] [facility].     Transportation at Discharge: his car is here as he drove himself here the day of his admission     Keys or means to access home:  Both patient and sister Isabel Hopkins [phone] has keys to the apt,        IM Medicare letter:   2nd IM Letter will be needed prior to discharge     Caregiver Contact:  Sister, Isabel Hopkins [phone], was contacted today by CM. Barbie Whatley provided as much information as she could     Discharge Caregiver contacted prior to [address] [place] [address], Isabel Hopkins [phone] will be contacted closer to discharge  [address], MSW

## 2021-04-07 NOTE — PROGRESS NOTES
Problem: Falls - Risk of  Goal: *Absence of Falls  Description: Document Taz De Leon Fall Risk and appropriate interventions in the flowsheet.   Outcome: Progressing Towards Goal  Note: Fall Risk Interventions:            Medication Interventions: Patient to call before getting OOB, Teach patient to arise slowly                   Problem: Patient Education: Go to Patient Education Activity  Goal: Patient/Family Education  Outcome: Progressing Towards Goal     Problem: Pain  Goal: *Control of Pain  Outcome: Progressing Towards Goal

## 2021-04-07 NOTE — PROGRESS NOTES
HD TRANSFER - OUT REPORT:    Verbal report given to MANUELITO Donato on Krystle Whatley being transferred to [facility] for routine progression of care       Report consisted of patient's Situation, Background, Assessment and   Recommendations(SBAR). Information from the following report(s) SBAR, Procedure Summary, Intake/Output and Recent Results was reviewed with the receiving nurse. Method:  $$ Method: Hemodialysis (04/07/21 9924)    Fluid Removed  NET Fluid Removed (mL): 2500 ml (04/07/21 1120)     Patient response to treatment:  Stable    End Time  Hemodialysis End Time: 1120 (04/07/21 1120)  If not documented, dialysis nurse to update post-dialysis row in HD/Filtration flowsheet     Medications /Volume expansion agents or Fluid boluses administered during treatment? no    Post-dialysis medication administration due?  yes  Remind nurse to administer post-HD medication upon return to unit. Fistula hemostasis? Yes    Line heparinization? no    Lines: SUZANNE AVG    Opportunity for questions and clarification was provided.       Patient transported with: [employer]

## 2021-04-07 NOTE — PROGRESS NOTES
+.      Hospitalist Progress Note    NAME: Raheel Paiz   :  1975   MRN:  [id number]       Assessment / Plan:  Gram-positive bacteremia (methicillin-resistant staph epidermidis) in 4 bottles 3/27  Had bacteremia in 2020, had femoral dialysis catheter for 2 months at the time (was tunneled catheter)  History of endocarditis  Transthoracic echocardiography with mobile structure in the left ventricle, ejection fraction is 30%mitral regurg  Coronary artery disease   ischemic cardiomyopathy with AICD  CT scan of the lumbar spine is not showing signs of osteomyelitis  Vascular surgery consulted mild fluid around AV graft. Planning exploration tomorrow . Cardiology were consulted and they are looking into the old ICD and leads. EVER 3/30 no signs of endocarditis  3/30 cultures still with 1/2 growing GPC. Culture repeat ordered  discussed case with infection disease Dr. Deni Perry     going for graft removal today. discussed with Dr. Carmencita Maldonado for culture of blood after graft removal. Will need permicath insertion (after documenting bacteremia clearance)  Hgb less than 7 (?dialuted sample) was ordered a unit of blood. No signs of bleeding. HH and culture post OP were sent  Operative report with no suspicion of infection. There was complication with some clotting intraoperatively \"   AVG was so incorporated that a graftotomy was made. The graft was clamped and this was repaired w/ a single suture but the graft clotted. It was difficult to declot the graft w/o heparin. Angiomax was used but this was not clearly effective as he kept clotting. AVG was angioplastied w/ a 7x10 balloon. Good flow on completion. Greater than average EBL due to need for repeated thrombectomy. \" received a unit of blood pre and one post operatively. Perigraft fluids sent for cultures will follow    4/3 no events   no events, leukocytic count elevated today, ?   Postoperative demargination, will follow     hyperkalemic today undergoing dialysis will receive 1 unit of packed RBCs as hemoglobin 7.1 only. Patient was arranged to be transferred to [facility] cardiology [address] I have communicated with [facility] but fortunately right now they are at capacity have discussed this case with Dr. Ewelina Buchanan and possibly continuing with the suppressive IV vancomycin until the patient might be able to be admitted at [facility] electively for the ICD removal which he thinks is a reasonable option I have discussed that with the case management so that infusion of antibiotics would be arranged and outpatient follow-up with Dr. Caryn Rodriguez soon after discharge will be ensured. Patient is also lethargic today and I am going to discontinue the IV morphine that he had been receiving since the vascular surgery exploration, will be discontinuing Percocet as well. Patient continues to complain of inability to sleep during the night and always appears drowsy during the day. 4/6 no new events. Awaiting arrangement for infusion therapy. Discussed with CM  Discussed the case with Dr. Dianne Booker and he had a discussion with infectious disease and he would like to keep the patient as inpatient with concerns of his high-grade bacteremia that has not cleared yet we will keep him inpatient for continued IV antibiotics until a bed becomes available at [facility]. I communicated with their transfer center again 4/6 and 4/7 he is still in the que. 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  PRN percocet q 6hrs      18.5 - 24.9 Normal weight / Body mass index is 23.07 kg/m². Code status: Full  Prophylaxis: Coumadin  Recommended Disposition: Home w/Family     Subjective:     Chief Complaint / Reason for Physician Visit   To follow-up with Mr. Violet Valderrama with gram-positive bacteremia (MSSE recurrent) with history ESRD dialysis via left AV graft, ischemic cardiopathy s/p ICD, HLD, gout. Today felling ok, voicing no complaints. No sleeping problems. discussed again with him the plan for transfer. Discussed with RN events overnight. Review of Systems:  Symptom Y/N Comments  Symptom Y/N Comments   Fever/Chills n   Chest Pain n    Poor Appetite    Edema     Cough n   Abdominal Pain n    Sputum n   Joint Pain     SOB/ZAMUDIO n   Pruritis/Rash     Nausea/vomit n   Tolerating PT/OT     Diarrhea n   Tolerating Diet     Constipation    Other       Could NOT obtain due to:      Objective:     VITALS:   Last 24hrs VS reviewed since prior progress note.  Most recent are:  Patient Vitals for the past 24 hrs:   Temp Pulse Resp BP SpO2   04/07/21 1208 98.1 °F (36.7 °C) 87 17 137/84 100 %   04/07/21 1120 98 °F (36.7 °C) 75 16 138/80    04/07/21 1115  69 16 131/79    04/07/21 1100  69 16 126/79    04/07/21 1045  68 16 124/76    04/07/21 1030  68 16 124/76    04/07/21 1015  73 16 128/82    04/07/21 1000  70 16 110/72    04/07/21 0945  73 16 127/67    04/07/21 0930  73 16 119/83    04/07/21 0915  77 16 133/84    04/07/21 0900  79 16 131/81    04/07/21 0845  75 16 119/83    04/07/21 0830  75 16 113/78    04/07/21 0815  81 16 117/82    04/07/21 0800  79 16 123/83    04/07/21 0748 98.2 °F (36.8 °C) 82 16 (!) 142/90 100 %   04/07/21 0309 97.6 °F (36.4 °C) 78 17 115/74 99 %   04/06/21 2331 97.8 °F (36.6 °C) 77 17 115/70 100 %   04/06/21 1945 97.5 °F (36.4 °C) 78 17 110/75 100 %   04/06/21 1534 97.6 °F (36.4 °C) 78 17 117/84 99 %       Intake/Output Summary (Last 24 hours) at 4/7/2021 1415  Last data filed at 4/7/2021 1312  Gross per 24 hour   Intake 820 ml   Output 2500 ml   Net -1680 ml        I had a face to face encounter and independently examined this patient on 4/7/2021, as outlined below:  PHYSICAL EXAM:  General: WD, WN. Alert, cooperative, no acute distress    EENT:  EOMI. Anicteric sclerae. MMM  Resp:  CTA bilaterally, no wheezing or rales. No accessory muscle use  CV:  Regular  rhythm,  No edema  GI:  Soft, Non distended, Non tender. +Bowel sounds  Neurologic:  Alert and oriented X 3, normal speech,   Psych:   Good insight. Not anxious nor agitated  Skin:  No rashes. No jaundice    Reviewed most current lab test results and cultures  YES  Reviewed most current radiology test results   YES  Review and summation of old records today    NO  Reviewed patient's current orders and MAR    YES  PMH/SH reviewed - no change compared to H&P  ________________________________________________________________________  Care Plan discussed with:    Comments   Patient X    Family      RN X    Care Manager X    Consultant                        Multidiciplinary team rounds were held today with , nursing, pharmacist and clinical coordinator. Patient's plan of care was discussed; medications were reviewed and discharge planning was addressed. ________________________________________________________________________  Total NON critical care TIME:  26  Minutes    Total CRITICAL CARE TIME Spent:   Minutes non procedure based      Comments   >50% of visit spent in counseling and coordination of care X    ________________________________________________________________________  Sid Mathis MD     Procedures: see electronic medical records for all procedures/Xrays and details which were not copied into this note but were reviewed prior to creation of Plan. LABS:  I reviewed today's most current labs and imaging studies.   Pertinent labs include:  Recent Labs     04/07/21  0148 04/06/21  0312 04/05/21  0317   WBC 13.1* 12.5* 17.9*   HGB 8.1* 7.9* 7.0*   HCT 25.3* 24.5* 22.2*    224 244     Recent Labs     04/07/21  0148 04/06/21 0312 04/05/21 0317   * 132* 129*   K 5.5* 4.9 7.1*   CL 99 99 93*   CO2 25 24 21   GLU 92 96 82   BUN 41* 31* 53*   CREA 7.88* 5.82* 8.80*   CA 9.3 9.1 8.9   MG  --  2.6* 2.3   PHOS  --  4.3 5.1*   INR 2.1* 1.8* 1.7*       Signed: Essie Willett MD

## 2021-04-07 NOTE — PROGRESS NOTES
End of Shift Note    Bedside shift change report given to Kinga (oncoming nurse) by Gokul Quezada (offgoing nurse). Report included the following information SBAR    Shift worked:  [id number]     Shift summary and any significant changes:     Pt did not have any significant changes during shift. Pt complained of shoulder and neck pain during shift and was given PRN tylenol during shift. No significant changes in vitals. Pt up ad fouzia to bathroom and in hallway during shift. Pt potassium this morning 5.5. Pt in chair during most of shift and rested quietly throughout night. Concerns for physician to address:       Zone phone for oncoming shift:   [phone]       Activity:  Activity Level: Up ad fouzia  Number times ambulated in hallways past shift: 1  Number of times OOB to chair past shift: 1    Cardiac:   Cardiac Monitoring: Yes      Cardiac Rhythm: Normal sinus rhythm    Access:   Current line(s): PIV     Genitourinary:   Urinary status: anuric    Respiratory:   O2 Device: Room air  Chronic home O2 use?: NO      GI:  Last Bowel Movement Date: 04/06/21  Current diet:  DIET RENAL Regular; Double Portions  Passing flatus: YES  Tolerating current diet: YES  % Diet Eaten: 80 %    Pain Management:   Patient states pain is manageable on current regimen: YES    Skin:  Yong Score: 22  Interventions: increase time out of bed    Patient Safety:  Fall Score:  Total Score: 0  Interventions: gripper socks  High Fall Risk: Yes    Length of Stay:  Expected LOS: 4d 19h  Actual LOS: [address]

## 2021-04-07 NOTE — PROCEDURES
Winter Dialysis Team [facility]  [phone]    Vitals   Pre   Post   Assessment   Pre   Post     Temp  Temp: 98.2 °F (36.8 °C) (04/07/21 0748)  98.0, oral LOC  A&Ox4 A&Ox4   HR   Pulse (Heart Rate): 82 (04/07/21 0748) 75 Lungs   clear clear   B/P   BP: (!) 142/90 (04/07/21 0748) 138/80 Cardiac   Tele, NSR Tele, NSR   Resp   Resp Rate: 16 (04/07/21 0748) 16 Skin   SUZANNE staples, CDI dressing SUZANNE staples, CDI dressing    Pain level  Pain Intensity 1: 5 (04/07/21 0723) 0 Edema  3+ BLE 3+ BLE   Orders:    Duration:   Start:    2486 End:    1120 Total:   3.5hr   Dialyzer:   Dialyzer/Set Up Inspection: Christie Adams (04/07/21 0748)   Kira Born Bath:   Dialysate K (mEq/L): 2 (04/07/21 0748)   Ca Bath:   Dialysate CA (mEq/L): 2.5 (04/07/21 0748)   Na/Bicarb:   Dialysate NA (mEq/L): 138 (04/07/21 0748)   Target Fluid Removal:   Goal/Amount of Fluid to Remove (mL): 2500 mL (04/07/21 0748)   Access     Type & Location:   SUZANNE AVG: skin CDI dressing over staples at 2 sites on AVG. Dressing removed for cannulation. No s/s of infection. + B/T. No issues with cannulation or hemostasis. Running well at .     Labs     Obtained/Reviewed   Critical Results Called   Date when labs were drawn-  Hgb-    HGB   Date Value Ref Range Status   04/07/2021 8.1 (L) 12.1 - 17.0 g/dL Final     K-    Potassium   Date Value Ref Range Status   04/07/2021 5.5 (H) 3.5 - 5.1 mmol/L Final     Ca-   Calcium   Date Value Ref Range Status   04/07/2021 9.3 8.5 - 10.1 MG/DL Final     Bun-   BUN   Date Value Ref Range Status   04/07/2021 41 (H) 6 - 20 MG/DL Final     Creat-   Creatinine   Date Value Ref Range Status   04/07/2021 7.88 (H) 0.70 - 1.30 MG/DL Final     Comment:     INVESTIGATED PER DELTA CHECK PROTOCOL        Medications/ Blood Products Given     Name   Dose   Route and Time     None ordered                Blood Volume Processed (BVP):    87.4L Net Fluid   Removed:  2500ml   Comments   Time Out Done: 3640  Primary Nurse Rpt Pre: Adrianne Hwang Rodrick Ramirez, RN  Primary Nurse Rpt Post: Genny Schofield RN  Pt Education: site rotation with AVG  Care Plan: [facility]  Tx Summary:  Pt arrived to HD suite A&Ox4. Consent signed & on file. SBAR received from Primary RN. 3692: Pt cannulated with 37M needles per policy & without issue. VSS. Dialysis Tx initiated. * no issues during HD*  1120: Tx ended. VSS. All possible blood returned to patient. Hemostasis achieved without issue. Bed locked and in the lowest position, call bell and belongings in reach. SBAR given to Primary, RN. Patient is stable at time of their departure. All Dialysis related medications have been reviewed. Admiting Diagnosis: Bacteremia  Pt's previous clinic- [facility]  Consent signed - Informed Consent Verified: Yes (04/07/21 0748)  Winter Consent - on file  Hepatitis Status- Immune 2/10/21  Machine #- Machine Number: [id number] (04/07/21 8664)  Telemetry status- Tele, NSR  Pre-dialysis wt. -

## 2021-04-07 NOTE — PROGRESS NOTES
General Surgery End of Shift Nursing Note    Bedside shift change report given to Shelby Griffin RN (oncoming nurse) by Mercy Ahumada, RN (offgoing nurse). Report included the following information SBAR, Kardex, Intake/Output, MAR and Recent Results. Shift worked:  7A-3P   Summary of shift:  Patient went to dialysis today. Percocet given for pain   Issues for physician to address: None      Number times ambulated in hallway past shift: 2  Number of times OOB to chair past shift: 3    Pain Management:  Current medication: Percocet  Patient states pain is manageable on current pain medication: Yes  GI:    Current diet:  DIET RENAL Regular; Double Portions    Tolerating current diet: Yes  Passing flatus:  Yes  Last Bowel Movement: yesterday   Appearance: unknown     Respiratory:    Incentive Spirometer at bedside: YES  Patient instructed on use: YES    Patient Safety:    Falls Score: 3  Bed Alarm On? No  Sitter?  No    Gilberto Pearce, RN

## 2021-04-07 NOTE — PROGRESS NOTES
Cardiology Progress Note  4/7/2021     Admit Date: 3/29/2021  Admit Diagnosis: Gram-positive bacteremia [R78.81]  Leucocytosis [D72.829]  ESRD (end stage renal disease) on dialysis ([facility] [address] [N18.6, Z99.2]  CC: none currently  Cardiac Assessment/Plan (per Dr Rosann Dubin):   Usual cardiologist:  Raman Marin MD     Assessment: 1. 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 Bacteremia 10/2020. EVER negative for overt endocarditis. BCX positive from 3/27, 3/29, 3/30. Negative 3/30, 4/1. Now on vancomycin (methicillin resistant). 2. Remote SJM single chamber ICD implant (dual coil shock lead) in 2009 with gen change in 2016 due to battery depletion, subsequent diagnosis of lead fracture, system turned OFF, subcutaneous [employer] ICD implanted at [facility]. No evidence of S-ICD pocket infection. 3. Ischemic cardiomyopathy, primary prevention indication for ICD implant. 4. CAD with anterior MI and remote stenting in 2007. Chronic aspirin therapy. 5. Chronic systolic CHF. 6. Remote history of endocarditis. 7. Remote history of IVC filter due to lower extremity DVT. Chronic anticoagulation with warfarin. 8. ESRD with dialysis. Remote renal transplant x 2 due to renal failure with nephrotic syndrome. Failed transplants, chronic hemodialysis with history of access issues, using LUE AV graft currently. 9. History of heparin associated thrombocytopenia. 10. Anemia due to chronic renal disease. Hgb 6.1 on 4/2. Normal platelets. 11. Iodine contrast reaction (per notes). 12. Full code.     Plan:      1. Endovascular SJM ICD extraction is reasonable to consider but not without substantial risk--this would have to be done outside of [facility]. Discussed with ID would be great if the subcutaneous ICD could stay in place, but that could come out too if needed at lower risk. .  2. He is not pacemaker-dependent nor has he received therapy for VT-VF, this is a primary prevention device implant.     S/p AV graft surgery without obvious infection. Dr. Shai Hitchcock was unable to arrange procedure in [address] [place] [address].     It seems that he's gotten his latest cardiology care at [facility], so can contact them regarding appropriateness for extraction. The number for [facility] is [phone], choose option 5 for physician to physician communication. I have called the above number this AM   and discussed with Dr. Yrn Mallory, patients [facility] electrophysiologist.  He accepts patient for transfer to [facility] [phone]. Will ask hospitalist to contact transfer center. There was some thought about discharging patient to see Dr. Rob Douglass as outpt, but after discussion with ID, due to persistent bacteremia she feels that this would be to high risk. Patient should remain in hospital until can be transferred to [facility] for ICD removal.    Awaiting transfer to [facility]. Cardiac meds: asa; lipitor 20; toprol Xl 12.5; coumadin. Receiving Tx today. For other plans, see orders.   Hospital problem list     Active Hospital Problems    Diagnosis Date Noted    Leucocytosis 2021    Gram-positive bacteremia 2021    ESRD (end stage renal disease) on dialysis ([facility] [address] 2013        Subjective: Osvaldo Tovar reports       Chest pain X none  consistent with:  Non-cardiac CP         Atypical CP     None now  On going  Anginal CP     Dyspnea X none  at rest  with exertion         improved  unchanged  worse              PND X none  overnight       Orthopnea X none  improved  unchanged  worse   Presyncope X none  improved  unchanged  worse     Ambulated in hallway without symptoms   Yes   Ambulated in room without symptoms  Yes   Objective:     Physical Exam:  Overall VSSAF;    Visit Vitals  /84   Pulse 87   Temp 98.1 °F (36.7 °C)   Resp 17   Ht 5' 7\" (1.702 m)   Wt 66.8 kg (147 lb 4.3 oz)   SpO2 100%   BMI 23.07 kg/m²     Temp (24hrs), Av.8 °F (36.6 °C), Min:97.5 °F (36.4 °C), Max:98.2 °F (36.8 °C)    Patient Vitals for the past 8 hrs:   Pulse   04/07/21 1208 87   04/07/21 1120 75   04/07/21 1115 69   04/07/21 1100 69   04/07/21 1045 68   04/07/21 1030 68   04/07/21 1015 73   04/07/21 1000 70   04/07/21 0945 73   04/07/21 0930 73   04/07/21 0915 77   04/07/21 0900 79   04/07/21 0845 75   04/07/21 0830 75   04/07/21 0815 81   04/07/21 0800 79   04/07/21 0748 82     Patient Vitals for the past 8 hrs:   Resp   04/07/21 1208 17   04/07/21 1120 16   04/07/21 1115 16   04/07/21 1100 16   04/07/21 1045 16   04/07/21 1030 16   04/07/21 1015 16   04/07/21 1000 16   04/07/21 0945 16   04/07/21 0930 16   04/07/21 0915 16   04/07/21 0900 16   04/07/21 0845 16   04/07/21 0830 16   04/07/21 0815 16   04/07/21 0800 16   04/07/21 0748 16     Patient Vitals for the past 8 hrs:   BP   04/07/21 1208 137/84   04/07/21 1120 138/80   04/07/21 1115 131/79   04/07/21 1100 126/79   04/07/21 1045 124/76   04/07/21 1030 124/76   04/07/21 1015 128/82   04/07/21 1000 110/72   04/07/21 0945 127/67   04/07/21 0930 119/83   04/07/21 0915 133/84   04/07/21 0900 131/81   04/07/21 0845 119/83   04/07/21 0830 113/78   04/07/21 0815 117/82   04/07/21 0800 123/83   04/07/21 0748 (!) 142/90       Intake/Output Summary (Last 24 hours) at 4/7/2021 1241  Last data filed at 4/7/2021 1120  Gross per 24 hour   Intake 940 ml   Output 2500 ml   Net -1560 ml     General Appearance: Well developed, well nourished, no acute distress. Ears/Nose/Mouth/Throat:   Normal MM; anicteric. JVP: WNL   Resp:   Few basal crackles. Nl resp effort. Cardiovascular:  RRR, S1, S2 normal, no new murmur. No gallop or rub. Abdomen:   Soft, non-tender, bowel sounds are present. Extremities: Mild edema bilaterally. Skin:  Neuro: Warm and dry.   A/O x3, grossly nonfocal       cath site intact w/o hematoma or new bruit; distal pulse unchanged  Yes   Data Review:     Telemetry independently reviewed x sinus  chronic afib  parox afib  NSVT     ECG independently reviewed  NSR  afib  no significant changes  NSST-Tw chgs     x no new ECG provided for review   Lab results reviewed as noted below. Current medications reviewed as noted below. No results for input(s): PH, PCO2, PO2 in the last 72 hours. No results for input(s): CPK, CKMB, CKNDX, TROIQ in the last 72 hours. Recent Labs     04/07/21 0148 04/06/21 0312 04/05/21 0317   * 132* 129*   K 5.5* 4.9 7.1*   CL 99 99 93*   CO2 25 24 21   BUN 41* 31* 53*   CREA 7.88* 5.82* 8.80*   GFRAA 9* 13* 8*   GLU 92 96 82   PHOS  --  4.3 5.1*   CA 9.3 9.1 8.9   WBC 13.1* 12.5* 17.9*   HGB 8.1* 7.9* 7.0*   HCT 25.3* 24.5* 22.2*    224 244     Lab Results   Component Value Date/Time    Cholesterol, total 139 05/22/2012 05:00 PM    HDL Cholesterol 32 05/22/2012 05:00 PM    LDL, calculated 75.8 05/22/2012 05:00 PM    Triglyceride 156 (H) 05/22/2012 05:00 PM    CHOL/HDL Ratio 4.3 05/22/2012 05:00 PM     No results for input(s): AP, TBIL, TP, ALB, GLOB, GGT, AML, LPSE in the last 72 hours.     No lab exists for component: SGOT, GPT, AMYP, HLPSE  Recent Labs     04/07/21 0148 04/06/21 0312 04/05/21 0317   INR 2.1* 1.8* 1.7*   PTP 21.7* 18.8* 17.1*      No components found for: GLPOC    Current Facility-Administered Medications   Medication Dose Route Frequency    0.9% sodium chloride infusion 250 mL  250 mL IntraVENous PRN    bisacodyL (DULCOLAX) tablet 5 mg  5 mg Oral DAILY    polyethylene glycol (MIRALAX) packet 34 g  34 g Oral DAILY    melatonin tablet 3 mg  3 mg Oral QHS PRN    WARFARIN INFORMATION NOTE (COUMADIN)   Other QPM    Vancomycin - Pharmacy to dose   Other Rx Dosing/Monitoring    atorvastatin (LIPITOR) tablet 20 mg  20 mg Oral DAILY    aspirin chewable tablet 81 mg  81 mg Oral DAILY    metoprolol succinate (TOPROL-XL) XL tablet 12.5 mg  12.5 mg Oral DAILY    pantoprazole (PROTONIX) tablet 40 mg  40 mg Oral ACB    calcitRIOL (ROCALTROL) capsule 0.5 mcg  0.5 mcg Oral DAILY    sodium chloride (NS) flush 5-40 mL  5-40 mL IntraVENous Q8H    sodium chloride (NS) flush 5-40 mL  5-40 mL IntraVENous PRN    acetaminophen (TYLENOL) tablet 650 mg  650 mg Oral Q6H PRN    Or    acetaminophen (TYLENOL) suppository 650 mg  650 mg Rectal Q6H PRN    ondansetron (ZOFRAN ODT) tablet 4 mg  4 mg Oral Q8H PRN    Or    ondansetron (ZOFRAN) injection 4 mg  4 mg IntraVENous Q6H PRN    calcium acetate(phosphat bind) (PHOSLO) capsule 1,334 mg  2 Cap Oral TID WITH MEALS    epoetin emilie-epbx (RETACRIT) 12,000 Units combo injection  12,000 Units SubCUTAneous Q MON, WED & Te MD Jarad

## 2021-04-08 ENCOUNTER — APPOINTMENT (OUTPATIENT)
Dept: NUCLEAR MEDICINE | Age: 46
DRG: 252 | End: 2021-04-08
Attending: STUDENT IN AN ORGANIZED HEALTH CARE EDUCATION/TRAINING PROGRAM
Payer: MEDICARE

## 2021-04-08 LAB
INR PPP: 2.3 (ref 0.9–1.1)
PROTHROMBIN TIME: 22.9 SEC (ref 9–11.1)

## 2021-04-08 PROCEDURE — 65270000029 HC RM PRIVATE

## 2021-04-08 PROCEDURE — 74011250637 HC RX REV CODE- 250/637: Performed by: STUDENT IN AN ORGANIZED HEALTH CARE EDUCATION/TRAINING PROGRAM

## 2021-04-08 PROCEDURE — 85610 PROTHROMBIN TIME: CPT

## 2021-04-08 PROCEDURE — 87040 BLOOD CULTURE FOR BACTERIA: CPT

## 2021-04-08 PROCEDURE — 94760 N-INVAS EAR/PLS OXIMETRY 1: CPT

## 2021-04-08 PROCEDURE — 99222 1ST HOSP IP/OBS MODERATE 55: CPT | Performed by: STUDENT IN AN ORGANIZED HEALTH CARE EDUCATION/TRAINING PROGRAM

## 2021-04-08 PROCEDURE — 36415 COLL VENOUS BLD VENIPUNCTURE: CPT

## 2021-04-08 RX ORDER — WARFARIN 2 MG/1
2 TABLET ORAL ONCE
Status: COMPLETED | OUTPATIENT
Start: 2021-04-08 | End: 2021-04-08

## 2021-04-08 RX ADMIN — WARFARIN SODIUM 2 MG: 2 TABLET ORAL at 17:01

## 2021-04-08 RX ADMIN — OXYCODONE HYDROCHLORIDE AND ACETAMINOPHEN 1 TABLET: 5; 325 TABLET ORAL at 03:52

## 2021-04-08 RX ADMIN — ATORVASTATIN CALCIUM 20 MG: 20 TABLET, FILM COATED ORAL at 10:03

## 2021-04-08 RX ADMIN — CALCIUM ACETATE 1334 MG: 667 CAPSULE ORAL at 10:03

## 2021-04-08 RX ADMIN — CALCITRIOL CAPSULES 0.25 MCG 0.5 MCG: 0.25 CAPSULE ORAL at 10:03

## 2021-04-08 RX ADMIN — PANTOPRAZOLE SODIUM 40 MG: 40 TABLET, DELAYED RELEASE ORAL at 06:46

## 2021-04-08 RX ADMIN — Medication 10 ML: at 06:47

## 2021-04-08 RX ADMIN — Medication 10 ML: at 22:11

## 2021-04-08 RX ADMIN — Medication 10 ML: at 13:19

## 2021-04-08 RX ADMIN — METOPROLOL SUCCINATE 12.5 MG: 25 TABLET, EXTENDED RELEASE ORAL at 10:03

## 2021-04-08 RX ADMIN — OXYCODONE HYDROCHLORIDE AND ACETAMINOPHEN 1 TABLET: 5; 325 TABLET ORAL at 16:24

## 2021-04-08 RX ADMIN — CALCIUM ACETATE 1334 MG: 667 CAPSULE ORAL at 17:01

## 2021-04-08 RX ADMIN — ASPIRIN 81 MG: 81 TABLET, CHEWABLE ORAL at 10:03

## 2021-04-08 RX ADMIN — OXYCODONE HYDROCHLORIDE AND ACETAMINOPHEN 1 TABLET: 5; 325 TABLET ORAL at 22:27

## 2021-04-08 RX ADMIN — CALCIUM ACETATE 1334 MG: 667 CAPSULE ORAL at 13:19

## 2021-04-08 RX ADMIN — OXYCODONE HYDROCHLORIDE AND ACETAMINOPHEN 1 TABLET: 5; 325 TABLET ORAL at 10:03

## 2021-04-08 NOTE — PROGRESS NOTES
Cardiology Progress Note  4/8/2021     Admit Date: 3/29/2021  Admit Diagnosis: Gram-positive bacteremia [R78.81]  Leucocytosis [D72.829]  ESRD (end stage renal disease) on dialysis ([facility] [address] [N18.6, Z99.2]  CC: none currently  Cardiac Assessment/Plan (per Dr Flanagan ):   Usual cardiologist:  Suman Cade MD     Assessment: 1. 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 Bacteremia 10/2020. EVER negative for overt endocarditis. BCX positive from 3/27, 3/29, 3/30. Negative 3/30, 4/1. Now on vancomycin (methicillin resistant). 2. Remote SJM single chamber ICD implant (dual coil shock lead) in 2009 with gen change in 2016 due to battery depletion, subsequent diagnosis of lead fracture, system turned OFF, subcutaneous [employer] ICD implanted at [facility]. No evidence of S-ICD pocket infection. 3. Ischemic cardiomyopathy, primary prevention indication for ICD implant. 4. CAD with anterior MI and remote stenting in 2007. Chronic aspirin therapy. 5. Chronic systolic CHF. 6. Remote history of endocarditis. 7. Remote history of IVC filter due to lower extremity DVT. Chronic anticoagulation with warfarin. 8. ESRD with dialysis. Remote renal transplant x 2 due to renal failure with nephrotic syndrome. Failed transplants, chronic hemodialysis with history of access issues, using LUE AV graft currently. 9. History of heparin associated thrombocytopenia. 10. Anemia due to chronic renal disease. Hgb 6.1 on 4/2. Normal platelets. 11. Iodine contrast reaction (per notes). 12. Full code.     Plan:      1. Endovascular SJM ICD extraction is reasonable to consider but not without substantial risk--this would have to be done outside of [facility]. Discussed with ID would be great if the subcutaneous ICD could stay in place, but that could come out too if needed at lower risk. .  2. He is not pacemaker-dependent nor has he received therapy for VT-VF, this is a primary prevention device implant.     S/p AV graft surgery without obvious infection. Dr. Kajal Méndez was unable to arrange procedure in [facility] system.     It seems that he's gotten his latest cardiology care at [facility], so can contact them regarding appropriateness for extraction. The number for [facility] EP is [phone], choose option 5 for physician to physician communication. I have called the above number this AM   and discussed with Dr. Caroline Berry, patients [facility] electrophysiologist.  He accepts patient for transfer to [facility] [phone]. Will ask hospitalist to contact transfer center. There was some thought about discharging patient to see Dr. Dany Clifton as outpt, but after discussion with ID, due to persistent bacteremia she feels that this would be to high risk. Patient should remain in hospital until can be transferred to [facility] for ICD removal.    Awaiting transfer to [facility]. Cardiac meds: asa; lipitor 20; toprol Xl 12.5; coumadin. Receiving Tx today. For other plans, see orders.   Hospital problem list     Active Hospital Problems    Diagnosis Date Noted    Leucocytosis 2021    Gram-positive bacteremia 2021    ESRD (end stage renal disease) on dialysis ([facility] [address] 2013        Subjective: Nigel Burnette reports       Chest pain X none  consistent with:  Non-cardiac CP         Atypical CP     None now  On going  Anginal CP     Dyspnea X none  at rest  with exertion         improved  unchanged  worse              PND X none  overnight       Orthopnea X none  improved  unchanged  worse   Presyncope X none  improved  unchanged  worse     Ambulated in hallway without symptoms   Yes   Ambulated in room without symptoms  Yes   Objective:     Physical Exam:  Overall VSSAF;    Visit Vitals  /79   Pulse 86   Temp 99.3 °F (37.4 °C)   Resp 15   Ht 5' 7\" (1.702 m)   Wt 66.8 kg (147 lb 4.3 oz)   SpO2 100%   BMI 23.07 kg/m²     Temp (24hrs), Av.5 °F (36.9 °C), Min:97.9 °F (36.6 °C), Max:99.3 °F (37.4 °C)    Patient Vitals for the past 8 hrs:   Pulse   04/08/21 0732 86   04/08/21 0511 87     Patient Vitals for the past 8 hrs:   Resp   04/08/21 0732 15   04/08/21 0511 18     Patient Vitals for the past 8 hrs:   BP   04/08/21 0732 128/79   04/08/21 0511 126/76       Intake/Output Summary (Last 24 hours) at 4/8/2021 0942  Last data filed at 4/7/2021 1653  Gross per 24 hour   Intake 690 ml   Output 2500 ml   Net -1810 ml     General Appearance: Well developed, well nourished, no acute distress. Ears/Nose/Mouth/Throat:   Normal MM; anicteric. JVP: WNL   Resp:   Few basal crackles. Nl resp effort. Cardiovascular:  RRR, S1, S2 normal, no new murmur. No gallop or rub. Abdomen:   Soft, non-tender, bowel sounds are present. Extremities: Mild edema bilaterally. Skin:  Neuro: Warm and dry. A/O x3, grossly nonfocal       cath site intact w/o hematoma or new bruit; distal pulse unchanged  Yes   Data Review:     Telemetry independently reviewed x sinus  chronic afib  parox afib  NSVT     ECG independently reviewed  NSR  afib  no significant changes  NSST-Tw chgs     x no new ECG provided for review   Lab results reviewed as noted below. Current medications reviewed as noted below. No results for input(s): PH, PCO2, PO2 in the last 72 hours. No results for input(s): CPK, CKMB, CKNDX, TROIQ in the last 72 hours.   Recent Labs     04/07/21  0148 04/06/21  0312   * 132*   K 5.5* 4.9   CL 99 99   CO2 25 24   BUN 41* 31*   CREA 7.88* 5.82*   GFRAA 9* 13*   GLU 92 96   PHOS  --  4.3   CA 9.3 9.1   WBC 13.1* 12.5*   HGB 8.1* 7.9*   HCT 25.3* 24.5*    224     Lab Results   Component Value Date/Time    Cholesterol, total 139 05/22/2012 05:00 PM    HDL Cholesterol 32 05/22/2012 05:00 PM    LDL, calculated 75.8 05/22/2012 05:00 PM    Triglyceride 156 (H) 05/22/2012 05:00 PM    CHOL/HDL Ratio 4.3 05/22/2012 05:00 PM     No results for input(s): AP, TBIL, TP, ALB, GLOB, GGT, AML, LPSE in the last 72 hours.    No lab exists for component: SGOT, GPT, AMYP, HLPSE  Recent Labs     04/08/21  0226 04/07/21  0148 04/06/21  0312   INR 2.3* 2.1* 1.8*   PTP 22.9* 21.7* 18.8*      No components found for: GLPOC    Current Facility-Administered Medications   Medication Dose Route Frequency    warfarin (COUMADIN) tablet 2 mg  2 mg Oral ONCE    oxyCODONE-acetaminophen (PERCOCET) 5-325 mg per tablet 1 Tab  1 Tab Oral Q6H PRN    0.9% sodium chloride infusion 250 mL  250 mL IntraVENous PRN    bisacodyL (DULCOLAX) tablet 5 mg  5 mg Oral DAILY    polyethylene glycol (MIRALAX) packet 34 g  34 g Oral DAILY    melatonin tablet 3 mg  3 mg Oral QHS PRN    WARFARIN INFORMATION NOTE (COUMADIN)   Other QPM    Vancomycin - Pharmacy to dose   Other Rx Dosing/Monitoring    atorvastatin (LIPITOR) tablet 20 mg  20 mg Oral DAILY    aspirin chewable tablet 81 mg  81 mg Oral DAILY    metoprolol succinate (TOPROL-XL) XL tablet 12.5 mg  12.5 mg Oral DAILY    pantoprazole (PROTONIX) tablet 40 mg  40 mg Oral ACB    calcitRIOL (ROCALTROL) capsule 0.5 mcg  0.5 mcg Oral DAILY    sodium chloride (NS) flush 5-40 mL  5-40 mL IntraVENous Q8H    sodium chloride (NS) flush 5-40 mL  5-40 mL IntraVENous PRN    acetaminophen (TYLENOL) tablet 650 mg  650 mg Oral Q6H PRN    Or    acetaminophen (TYLENOL) suppository 650 mg  650 mg Rectal Q6H PRN    ondansetron (ZOFRAN ODT) tablet 4 mg  4 mg Oral Q8H PRN    Or    ondansetron (ZOFRAN) injection 4 mg  4 mg IntraVENous Q6H PRN    calcium acetate(phosphat bind) (PHOSLO) capsule 1,334 mg  2 Cap Oral TID WITH MEALS    epoetin emilie-epbx (RETACRIT) 12,000 Units combo injection  12,000 Units SubCUTAneous Q MON, WED & Roel Tapia MD

## 2021-04-08 NOTE — PROGRESS NOTES
Pharmacy Daily Dosing of Warfarin    Indication: AFib    Goal INR: 2-3    PTA Warfarin Dose: warfarin 2.5 mg daily    Concurrent anticoagulants: NA    Concurrent antiplatelet: aspirin    Major Interacting Medications   Drugs that may increase INR: NA  Drugs that may decrease INR: NA    Conditions that may increase/decrease INR (CHF, C. diff, cirrhosis, thyroid disorder, hypoalbuminemia): NA    Labs:  Recent Labs     04/08/21  0226 04/07/21  0148 04/06/21  0312   INR 2.3* 2.1* 1.8*   HGB  --  8.1* 7.9*   PLT  --  252 224     Impression/Plan:   Warfain 2.0 mg PO x 1 today in view of the INR consistently increasing. Eventually aim for the home dose     Daily INR  CBC w/o diff [facility]     Pharmacy will follow daily and adjust the dose as appropriate.     Thanks  Diana Orellana, [facility]

## 2021-04-08 NOTE — PROGRESS NOTES
+.      Hospitalist Progress Note    NAME: Yoshi Christian   :  1975   MRN:  [id number]       Assessment / Plan:  Gram-positive bacteremia (methicillin-resistant staph epidermidis) in 4 bottles 3/27  Had bacteremia in 2020, had femoral dialysis catheter for 2 months at the time (was tunneled catheter)  History of endocarditis  Transthoracic echocardiography with mobile structure in the left ventricle, ejection fraction is 30%mitral regurg  Coronary artery disease   ischemic cardiomyopathy with AICD  CT scan of the lumbar spine is not showing signs of osteomyelitis  Vascular surgery consulted mild fluid around AV graft. Planning exploration tomorrow . Cardiology were consulted and they are looking into the old ICD and leads. EVER 3/30 no signs of endocarditis  3/30 cultures still with 1/2 growing GPC. Culture repeat ordered  discussed case with infection disease Dr. Severo Mace     going for graft removal today. discussed with Dr. Alisha Walker for culture of blood after graft removal. Will need permicath insertion (after documenting bacteremia clearance)  Hgb less than 7 (?dialuted sample) was ordered a unit of blood. No signs of bleeding. HH and culture post OP were sent  Operative report with no suspicion of infection. There was complication with some clotting intraoperatively \"   AVG was so incorporated that a graftotomy was made. The graft was clamped and this was repaired w/ a single suture but the graft clotted. It was difficult to declot the graft w/o heparin. Angiomax was used but this was not clearly effective as he kept clotting. AVG was angioplastied w/ a 7x10 balloon. Good flow on completion. Greater than average EBL due to need for repeated thrombectomy. \" received a unit of blood pre and one post operatively. Perigraft fluids sent for cultures will follow    4/3 no events   no events, leukocytic count elevated today, ?   Postoperative demargination, will follow     hyperkalemic today undergoing dialysis will receive 1 unit of packed RBCs as hemoglobin 7.1 only. Patient was arranged to be transferred to [facility] cardiology [address] I have communicated with [facility] but fortunately right now they are at capacity have discussed this case with Dr. Solomon Score and possibly continuing with the suppressive IV vancomycin until the patient might be able to be admitted at [facility] electively for the ICD removal which he thinks is a reasonable option I have discussed that with the case management so that infusion of antibiotics would be arranged and outpatient follow-up with Dr. Rakesh Cline soon after discharge will be ensured. Patient is also lethargic today and I am going to discontinue the IV morphine that he had been receiving since the vascular surgery exploration, will be discontinuing Percocet as well. Patient continues to complain of inability to sleep during the night and always appears drowsy during the day. 4/6 no new events. Awaiting arrangement for infusion therapy. Discussed with CM  Discussed the case with Dr. Rose Mary Sanches and he had a discussion with infectious disease and he would like to keep the patient as inpatient with concerns of his high-grade bacteremia that has not cleared yet we will keep him inpatient for continued IV antibiotics until a bed becomes available at [facility].   I communicated with their transfer center again 4/6 and 4/7 he is still in the que. 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 PRN percocet q 6hrs      18.5 - 24.9 Normal weight / Body mass index is 23.07 kg/m². Code status: Full  Prophylaxis: Coumadin  Recommended Disposition: Home w/Family     Subjective:     Chief Complaint / Reason for Physician Visit   To follow-up with Mr. Israel Covert with gram-positive bacteremia (MSSE recurrent) with history ESRD dialysis via left AV graft, ischemic cardiopathy s/p ICD, HLD, gout. Voicing no complaints today. Discussed with RN events overnight. Review of Systems:  Symptom Y/N Comments  Symptom Y/N Comments   Fever/Chills n   Chest Pain n    Poor Appetite    Edema     Cough n   Abdominal Pain n    Sputum n   Joint Pain     SOB/ZAMUDIO n   Pruritis/Rash     Nausea/vomit n   Tolerating PT/OT     Diarrhea n   Tolerating Diet     Constipation    Other       Could NOT obtain due to:      Objective:     VITALS:   Last 24hrs VS reviewed since prior progress note. Most recent are:  Patient Vitals for the past 24 hrs:   Temp Pulse Resp BP SpO2   04/08/21 1547 98.7 °F (37.1 °C) 82 15 119/78 100 %   04/08/21 1104 99 °F (37.2 °C) 81 15 124/83 94 %   04/08/21 0732 99.3 °F (37.4 °C) 86 15 128/79 100 %   04/08/21 0511 98.7 °F (37.1 °C) 87 18 126/76 98 %   04/08/21 0035 98.6 °F (37 °C) 87 17 (!) 104/55 95 %   04/07/21 2030 97.9 °F (36.6 °C) 84 16 114/73 99 %       Intake/Output Summary (Last 24 hours) at 4/8/2021 1652  Last data filed at 4/7/2021 1653  Gross per 24 hour   Intake 200 ml   Output    Net 200 ml        I had a face to face encounter and independently examined this patient on 4/8/2021, as outlined below:  PHYSICAL EXAM:  General: WD, WN. Alert, cooperative, no acute distress    EENT:  EOMI. Anicteric sclerae. MMM  Resp:  CTA bilaterally, no wheezing or rales. No accessory muscle use  CV:  Regular  rhythm,  No edema  GI:  Soft, Non distended, Non tender.   +Bowel sounds  Neurologic:  Alert and oriented X 3, normal speech,   Psych:   Good insight. Not anxious nor agitated  Skin:  No rashes. No jaundice    Reviewed most current lab test results and cultures  YES  Reviewed most current radiology test results   YES  Review and summation of old records today    NO  Reviewed patient's current orders and MAR    YES  PMH/SH reviewed - no change compared to H&P  ________________________________________________________________________  Care Plan discussed with:    Comments   Patient x    Family      RN x    Care Manager x    Consultant                        Multidiciplinary team rounds were held today with , nursing, pharmacist and clinical coordinator. Patient's plan of care was discussed; medications were reviewed and discharge planning was addressed. ________________________________________________________________________  Total NON critical care TIME:  23  Minutes    Total CRITICAL CARE TIME Spent:   Minutes non procedure based      Comments   >50% of visit spent in counseling and coordination of care x    ________________________________________________________________________  Rhnia Hutchinson MD     Procedures: see electronic medical records for all procedures/Xrays and details which were not copied into this note but were reviewed prior to creation of Plan. LABS:  I reviewed today's most current labs and imaging studies.   Pertinent labs include:  Recent Labs     04/07/21 0148 04/06/21 0312   WBC 13.1* 12.5*   HGB 8.1* 7.9*   HCT 25.3* 24.5*    224     Recent Labs     04/08/21 0226 04/07/21 0148 04/06/21 0312   NA  --  133* 132*   K  --  5.5* 4.9   CL  --  99 99   CO2  --  25 24   GLU  --  92 96   BUN  --  41* 31*   CREA  --  7.88* 5.82*   CA  --  9.3 9.1   MG  --   --  2.6*   PHOS  --   --  4.3   INR 2.3* 2.1* 1.8*       Signed: Rhina Hutchinson MD

## 2021-04-08 NOTE — PROGRESS NOTES
Problem: Falls - Risk of  Goal: *Absence of Falls  Description: Document Luzmaria Cedeno Fall Risk and appropriate interventions in the flowsheet.   Outcome: Progressing Towards Goal  Note: Fall Risk Interventions:            Medication Interventions: Patient to call before getting OOB, Teach patient to arise slowly                   Problem: Patient Education: Go to Patient Education Activity  Goal: Patient/Family Education  Outcome: Progressing Towards Goal     Problem: Pain  Goal: *Control of Pain  Outcome: Progressing Towards Goal

## 2021-04-08 NOTE — PROGRESS NOTES
Infectious Disease Progress Note             Subjective:   Patient endorses having night sweats and feeling malaise and myalgias on and off, very similar to what brought him in. Still has neck pain. Objective:    Vitals:   Patient Vitals for the past 24 hrs:   Temp Pulse Resp BP SpO2   04/08/21 1104 99 °F (37.2 °C) 81 15 124/83 94 %   04/08/21 0732 99.3 °F (37.4 °C) 86 15 128/79 100 %   04/08/21 0511 98.7 °F (37.1 °C) 87 18 126/76 98 %   04/08/21 0035 98.6 °F (37 °C) 87 17 (!) 104/55 95 %   04/07/21 2030 97.9 °F (36.6 °C) 84 16 114/73 99 %   04/07/21 1533    (!) 111/53    04/07/21 1530 98.6 °F (37 °C) 78 17 (!) 108/50 96 %       Physical Exam:  ? GEN: NAD  ? HEENT: Normocephalic, atraumatic, PERRL, no scleral icterus, no visible inflammation at the left neck region. ? CV: S1, S2 heard regularly, no murmurs, + thrill at the AV graft left arm, pacemaker sites appear benign - both the left lateral chest and the anterior chest region. ? Lungs: Clear to auscultation bilaterally  ? Genitourinary: no oneal  ? Extremities: no edema  ? Neuro: Alert, oriented to time, place and situation, moves all extremities to commands, verbal   ? Skin: no rash, left chest subcutaneous-ICD site looks benign and no signs of inflammation or tenderness there. ? Psych: good affect, good eye contact, non tearful   ? Lines: left arm AV graft         Labs:  Recent Results (from the past 24 hour(s))   PROTHROMBIN TIME + INR    Collection Time: 04/08/21  2:26 AM   Result Value Ref Range    INR 2.3 (H) 0.9 - 1.1      Prothrombin time 22.9 (H) 9.0 - 11.1 sec           Micro:  3/27: Blood cultures 4/4 bottles with Staph epidermidis (Methicillin-R),  (Vanc ZECHARAIH 1ug/mL) , Willian Cov 2 negative   3/29: Blood cultures 4/4 bottles positive for MRSE  3/30: Blood cultures with MRSE  4/1: Blood cultures with MRSE (Vanc ZECHARIAH 1ug/mL)  4/2: AV graft fluid: No WBCs, no organisms on gram stain. Cultures negative so far.            Assessment:  40 yo M with:        - Sepsis due to high-grade MRSE bacteremia this admission.   - CAD with MI with 2 AICD placements (2009 - non-functioning due to fractured lead, then a subcutaneous ICD placement in 2019): patient has both devices in him. 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 - S/p AV graft exploration on 4/2. Operative findings not suggestive of infection, and the gram stain and cultures from the graft have been negative. - Nephrotic syndome s/p 2 renal transplants. Both transplants failed due to graft-medication non-compliance (last transplant in 2001). - ESRD on iHD, 5 times a week from home since 2012. In 09/2020 had left AV graft placed since complicated by clotting issues. -  MSSA bacteremia in 2017  - MRSE bacteremia in 10/2020 likely femoral HD line-related and possible L1-L2 discitis/osteomyelitis treated with 6 weeks of vancomycin. Since 11/2020, the graft is being used for dialysis.       Recommendations:  - Patient needs ICD removal on an urgent basis. He is bacteremic. I have spoken to EP cardiology at [facility] Dr. Julisa Elliott this afternoon. Patient has been accepted there but is awaiting beds. Also spoke to [facility] transfer center and have clearly stated that this is not a regular medical situation, and it needs an urgent transfer for the procedure. At this time of the note, [facility] transfer center is going to contact cardiology here. This is a high-morbidity, mortality infection, and needs urgent transfer for ICD removal.     - Blood cultures today, but patient is presumed positive. All his prior blood cultures have grown MRSE, from 3/27 to 4/1/21.     - WBC tagged scan recommended by [facility] cardiology to see if subcutaneous-ICD also involved. To be done 4/9/21.    - Patient is still bacteremia due to MRSE. Will recommend against outpatient management for this.  He should continue on vancomycin with HD with goal trough 15-20. ICD removal needs to happen sooner rather than later. The more this is delayed, the more there is an increase in morbidity/ This is an aggressive infection. At this time we are waiting for beds at [facility]. Discussed with Cardiology.    - Once ICD is removed, obtain blood cultures. Start of date of IV vancomycin will begin from date of first negative blood cultures taken AFTER the ICD removal. Duration will be atleast 6 weeks of IV vancomycin goal trough 15-20. Please note that patient should be be receiving any less than 6 weeks of vancomycin.     - No signs of infectious involvement of the subcutaneous-ICD clinically on exam. Since all the components of this device are subcutaneous and no exposure to vascular space, suspicion on infectious involvement is low right now and hence extraction is not indicated at this time. Continue to monitor for signs of pocket infection for this device. - Patient can continue to follow [employer] ID or [facility] ID after discharge from [facility]. Can call [phone] for appointment. Will follow. Perfecrtserve with any Qs. Thank you for the opportunity to participate in the care of this patient. Please contact with questions or concerns.       Suhas Villaseñor MD  Infectious Diseases

## 2021-04-08 NOTE — PROGRESS NOTES
Nephrology Progress Note  Blaise Leblanc     [facility]. [facility]  Phone - [phone]   Patient: Gomez Garrett    YOB: 1975        Date- 4/8/2021   Admit Date: 3/29/2021  CC: Follow up for ESRD        IMPRESSION & PLAN:   · End-stage renal disease home HD 5 days per week- Follows up with Dr Hermelindo Block at [facility]  · Failed RTX times 2  · Hyperkalemia  · Gram positive sepsis  · Anemia of ckd  · Hx of renal tx in past times 2.  · Hypertension  · CAD  · H/o DVT, s/p ivc filter/ h/o HIT     Plan:  -Plan for dialysis tomorrow  -Plan to continue Monday Wednesday Friday dialysis  -Recent cultures negative so far  -Vancomycin per ID.  -Ongoing work up for source of infection.   -Plan for transfer to [address] [place] [address] for Lead extraction. Subjective: Interval History:   -  Feels  Better today    Objective:   Vitals:    04/08/21 0035 04/08/21 0511 04/08/21 0732 04/08/21 1104   BP: (!) 104/55 126/76 128/79 124/83   Pulse: 87 87 86 81   Resp: 17 18 15 15   Temp: 98.6 °F (37 °C) 98.7 °F (37.1 °C) 99.3 °F (37.4 °C) 99 °F (37.2 °C)   TempSrc:       SpO2: 95% 98% 100% 94%   Weight:       Height:          04/07 0701 - 04/08 0700  In: 690 [P.O.:440; I.V.:250]  Out: 2500   Last 3 Recorded Weights in this Encounter    04/05/21 0406 04/06/21 0458 04/07/21 0723   Weight: 68.5 kg (151 lb 0.2 oz) 66.3 kg (146 lb 2.6 oz) 66.8 kg (147 lb 4.3 oz)      Physical exam:   GEN: NAD  NECK- Supple, no mass  RESP: No wheezing, clear b/l  CVS: S1,S2  RRR  NEURO: Normal speech, nonfocal   EXT: No Edema   PSYCH: Normal Mood    Chart reviewed. Pertinent Notes reviewed.      Data Review :  Recent Labs     04/07/21  0148 04/06/21  0312   * 132*   K 5.5* 4.9   CL 99 99   CO2 25 24   BUN 41* 31*   CREA 7.88* 5.82*   GLU 92 96   CA 9.3 9.1   MG  --  2.6*   PHOS  --  4.3     Recent Labs     04/07/21  0148 04/06/21  0312   WBC 13.1* 12.5*   HGB 8.1* 7.9*   HCT 25.3* 24.5*    224     No results for input(s): FE, TIBC, PSAT, FERR in the last 72 hours.    Medication list  reviewed  Current Facility-Administered Medications   Medication Dose Route Frequency    warfarin (COUMADIN) tablet 2 mg  2 mg Oral ONCE    oxyCODONE-acetaminophen (PERCOCET) 5-325 mg per tablet 1 Tab  1 Tab Oral Q6H PRN    0.9% sodium chloride infusion 250 mL  250 mL IntraVENous PRN    bisacodyL (DULCOLAX) tablet 5 mg  5 mg Oral DAILY    polyethylene glycol (MIRALAX) packet 34 g  34 g Oral DAILY    melatonin tablet 3 mg  3 mg Oral QHS PRN    WARFARIN INFORMATION NOTE (COUMADIN)   Other QPM    Vancomycin - Pharmacy to dose   Other Rx Dosing/Monitoring    atorvastatin (LIPITOR) tablet 20 mg  20 mg Oral DAILY    aspirin chewable tablet 81 mg  81 mg Oral DAILY    metoprolol succinate (TOPROL-XL) XL tablet 12.5 mg  12.5 mg Oral DAILY    pantoprazole (PROTONIX) tablet 40 mg  40 mg Oral ACB    calcitRIOL (ROCALTROL) capsule 0.5 mcg  0.5 mcg Oral DAILY    sodium chloride (NS) flush 5-40 mL  5-40 mL IntraVENous Q8H    sodium chloride (NS) flush 5-40 mL  5-40 mL IntraVENous PRN    acetaminophen (TYLENOL) tablet 650 mg  650 mg Oral Q6H PRN    Or    acetaminophen (TYLENOL) suppository 650 mg  650 mg Rectal Q6H PRN    ondansetron (ZOFRAN ODT) tablet 4 mg  4 mg Oral Q8H PRN    Or    ondansetron (ZOFRAN) injection 4 mg  4 mg IntraVENous Q6H PRN    calcium acetate(phosphat bind) (PHOSLO) capsule 1,334 mg  2 Cap Oral TID WITH MEALS    epoetin emilie-epbx (RETACRIT) 12,000 Units combo injection  12,000 Units SubCUTAneous Q MON, WED & FRI          Sharla Moon MD              [facility]  [facility] / [facility], [address] [facility], [address]  Phone - [phone]               Fax - [phone]

## 2021-04-08 NOTE — PROGRESS NOTES
Nuclear Medicine WBC scan to be done tomorrow. Pt will start up with Nuc Med at 7:30am. There is no prep. Spoke with [facility] (RN).

## 2021-04-08 NOTE — PROGRESS NOTES
Chart reviewed and patient care plan discussed during IDR's. Plan is still for patient to transfer to [facility] for Endovascular SJM ICD extraction . CM called [facility] transfer center [phone]) to determine that patient is still on list for bed availability and EMTALA - will contact MD when approved for transfer.       Arlette Lopes, RN, BSN, [address] [place] [address]    Coordinator  [phone]

## 2021-04-09 ENCOUNTER — APPOINTMENT (OUTPATIENT)
Dept: NUCLEAR MEDICINE | Age: 46
DRG: 252 | End: 2021-04-09
Attending: STUDENT IN AN ORGANIZED HEALTH CARE EDUCATION/TRAINING PROGRAM
Payer: MEDICARE

## 2021-04-09 ENCOUNTER — HOSPITAL ENCOUNTER (OUTPATIENT)
Dept: NUCLEAR MEDICINE | Age: 46
Discharge: HOME | DRG: 252 | End: 2021-04-09
Attending: STUDENT IN AN ORGANIZED HEALTH CARE EDUCATION/TRAINING PROGRAM
Payer: MEDICARE

## 2021-04-09 LAB
ANION GAP SERPL CALC-SCNC: 8 MMOL/L (ref 5–15)
BUN SERPL-MCNC: 36 MG/DL (ref 6–20)
BUN/CREAT SERPL: 5 (ref 12–20)
CALCIUM SERPL-MCNC: 9.5 MG/DL (ref 8.5–10.1)
CHLORIDE SERPL-SCNC: 94 MMOL/L (ref 97–108)
CO2 SERPL-SCNC: 26 MMOL/L (ref 21–32)
CREAT SERPL-MCNC: 7.87 MG/DL (ref 0.7–1.3)
ERYTHROCYTE [DISTWIDTH] IN BLOOD BY AUTOMATED COUNT: 16.7 % (ref 11.5–14.5)
GLUCOSE SERPL-MCNC: 80 MG/DL (ref 65–100)
HCT VFR BLD AUTO: 28 % (ref 36.6–50.3)
HGB BLD-MCNC: 8.8 G/DL (ref 12.1–17)
INR PPP: 2.1 (ref 0.9–1.1)
MCH RBC QN AUTO: 30.8 PG (ref 26–34)
MCHC RBC AUTO-ENTMCNC: 31.4 G/DL (ref 30–36.5)
MCV RBC AUTO: 97.9 FL (ref 80–99)
NRBC # BLD: 0 K/UL (ref 0–0.01)
NRBC BLD-RTO: 0 PER 100 WBC
PLATELET # BLD AUTO: 251 K/UL (ref 150–400)
PMV BLD AUTO: 10.3 FL (ref 8.9–12.9)
POTASSIUM SERPL-SCNC: 5.9 MMOL/L (ref 3.5–5.1)
PROTHROMBIN TIME: 21.7 SEC (ref 9–11.1)
RBC # BLD AUTO: 2.86 M/UL (ref 4.1–5.7)
SODIUM SERPL-SCNC: 128 MMOL/L (ref 136–145)
WBC # BLD AUTO: 11.9 K/UL (ref 4.1–11.1)

## 2021-04-09 PROCEDURE — 87040 BLOOD CULTURE FOR BACTERIA: CPT

## 2021-04-09 PROCEDURE — 74011250637 HC RX REV CODE- 250/637: Performed by: STUDENT IN AN ORGANIZED HEALTH CARE EDUCATION/TRAINING PROGRAM

## 2021-04-09 PROCEDURE — 36415 COLL VENOUS BLD VENIPUNCTURE: CPT

## 2021-04-09 PROCEDURE — 80048 BASIC METABOLIC PNL TOTAL CA: CPT

## 2021-04-09 PROCEDURE — 94760 N-INVAS EAR/PLS OXIMETRY 1: CPT

## 2021-04-09 PROCEDURE — 90935 HEMODIALYSIS ONE EVALUATION: CPT

## 2021-04-09 PROCEDURE — 85027 COMPLETE CBC AUTOMATED: CPT

## 2021-04-09 PROCEDURE — 74011250636 HC RX REV CODE- 250/636: Performed by: INTERNAL MEDICINE

## 2021-04-09 PROCEDURE — 65270000029 HC RM PRIVATE

## 2021-04-09 PROCEDURE — 74011250636 HC RX REV CODE- 250/636: Performed by: STUDENT IN AN ORGANIZED HEALTH CARE EDUCATION/TRAINING PROGRAM

## 2021-04-09 PROCEDURE — 74011250637 HC RX REV CODE- 250/637: Performed by: INTERNAL MEDICINE

## 2021-04-09 PROCEDURE — A9521 TC99M EXAMETAZIME: HCPCS

## 2021-04-09 PROCEDURE — 85610 PROTHROMBIN TIME: CPT

## 2021-04-09 RX ORDER — HEPARIN SODIUM 1000 [USP'U]/ML
2000 INJECTION, SOLUTION INTRAVENOUS; SUBCUTANEOUS ONCE
Status: COMPLETED | OUTPATIENT
Start: 2021-04-09 | End: 2021-04-09

## 2021-04-09 RX ORDER — WARFARIN 2.5 MG/1
2.5 TABLET ORAL ONCE
Status: COMPLETED | OUTPATIENT
Start: 2021-04-09 | End: 2021-04-09

## 2021-04-09 RX ADMIN — PANTOPRAZOLE SODIUM 40 MG: 40 TABLET, DELAYED RELEASE ORAL at 14:40

## 2021-04-09 RX ADMIN — ASPIRIN 81 MG: 81 TABLET, CHEWABLE ORAL at 14:39

## 2021-04-09 RX ADMIN — METOPROLOL SUCCINATE 12.5 MG: 25 TABLET, EXTENDED RELEASE ORAL at 14:39

## 2021-04-09 RX ADMIN — ACETAMINOPHEN 650 MG: 325 TABLET, FILM COATED ORAL at 01:15

## 2021-04-09 RX ADMIN — WARFARIN SODIUM 2.5 MG: 2.5 TABLET ORAL at 17:38

## 2021-04-09 RX ADMIN — ACETAMINOPHEN 650 MG: 325 TABLET, FILM COATED ORAL at 17:38

## 2021-04-09 RX ADMIN — CALCIUM ACETATE 1334 MG: 667 CAPSULE ORAL at 17:38

## 2021-04-09 RX ADMIN — CALCIUM ACETATE 1334 MG: 667 CAPSULE ORAL at 14:39

## 2021-04-09 RX ADMIN — OXYCODONE HYDROCHLORIDE AND ACETAMINOPHEN 1 TABLET: 5; 325 TABLET ORAL at 20:47

## 2021-04-09 RX ADMIN — VANCOMYCIN HYDROCHLORIDE 750 MG: 750 INJECTION, POWDER, LYOPHILIZED, FOR SOLUTION INTRAVENOUS at 18:21

## 2021-04-09 RX ADMIN — Medication 10 ML: at 22:29

## 2021-04-09 RX ADMIN — OXYCODONE HYDROCHLORIDE AND ACETAMINOPHEN 1 TABLET: 5; 325 TABLET ORAL at 04:54

## 2021-04-09 RX ADMIN — EPOETIN ALFA-EPBX 12000 UNITS: 10000 INJECTION, SOLUTION INTRAVENOUS; SUBCUTANEOUS at 22:28

## 2021-04-09 RX ADMIN — OXYCODONE HYDROCHLORIDE AND ACETAMINOPHEN 1 TABLET: 5; 325 TABLET ORAL at 14:09

## 2021-04-09 RX ADMIN — CALCITRIOL CAPSULES 0.25 MCG 0.5 MCG: 0.25 CAPSULE ORAL at 14:39

## 2021-04-09 RX ADMIN — Medication 10 ML: at 05:23

## 2021-04-09 RX ADMIN — HEPARIN SODIUM 2000 UNITS: 1000 INJECTION INTRAVENOUS; SUBCUTANEOUS at 10:39

## 2021-04-09 RX ADMIN — ATORVASTATIN CALCIUM 20 MG: 20 TABLET, FILM COATED ORAL at 14:39

## 2021-04-09 NOTE — PROGRESS NOTES
TRANSFER - IN REPORT:    Verbal report received from Tarun Nath RN on Nubia Tammy  being received from Gen-Surg for ordered procedure      Report consisted of patients Situation, Background, Assessment and   Recommendations(SBAR). Information from the following report(s) SBAR, Tele, NSR; Kardex was reviewed with the receiving nurse. Opportunity for questions and clarification was provided. Assessment completed upon patients arrival to unit and care assumed.

## 2021-04-09 NOTE — PROGRESS NOTES
End of Shift Note    Bedside shift change report given to Lennie Balderas (oncoming nurse) by Tiny Ayala (offgoing nurse). Report included the following information SBAR, Kardex and MAR    Shift worked:  7a-330     Shift summary and any significant changes:     pt to dialysis, nuclear med. Tolerating pain magmt     Concerns for physician to address:  none     Zone phone for oncoming shift:   [phone]       Activity:  Activity Level: Up ad fouzia  Number times ambulated in hallways past shift: 0  Number of times OOB to chair past shift: 0    Cardiac:   Cardiac Monitoring: Yes      Cardiac Rhythm: Normal sinus rhythm    Access:   Current line(s): PIV     Genitourinary:   Urinary status: voiding    Respiratory:   O2 Device: Room air  Chronic home O2 use?: NO  Incentive spirometer at bedside: YES     GI:  Last Bowel Movement Date: 04/06/21  Current diet:  DIET RENAL Regular; Double Portions  Passing flatus: YES  Tolerating current diet: YES  % Diet Eaten: 100 %    Pain Management:   Patient states pain is manageable on current regimen: YES    Skin:  Yong Score: 22  Interventions: increase time out of bed    Patient Safety:  Fall Score:  Total Score: 1  Interventions: gripper socks  High Fall Risk: Yes    Length of Stay:  Expected LOS: 4d 19h  Actual LOS: [place]

## 2021-04-09 NOTE — PROGRESS NOTES
8:45AM:  Patient currently off unit in HD suite for HD treatment. CM called [facility] Patient Transfer Center at [phone] and spoke with  Batsheva Pascal and she confirmed that patient is on the transfer list, however there is not bed at this time available for patient at [address] [place] [address] for patient to be transferred for endovascular SJM ICD Extraction. Batsheva Pascal confirmed that the unit at [facility] will be contacted when bed at [address] [place] [address] is available for patient. As of 4/9, pt is awaiting transfer to [address] [place] [address] for removal of ICD which cannot be done within the [facility] system. Hospitalist, Dr. Robyn Edmonds, is communicating with transfer center [phone]) for bed availability. Transfer will be BLS ambu and will require EMTALA documentation.   Per cardiology note of 4/8, Patient should remain in hospital until can be transferred to [address] [place] [address] for ICD removal.    Transition of Care Plan:     VUS:   50   %     Disposition:  TBD: possibly home with sister who is his caregiver; currently patient and sister who is also his caregiver and assistant with his home hemodialysis reside in an apartment together. Kaylynn Diana has been independent with his activities of daily living including his home hemodialysis and he is assisted with his home hemodialysis through [facility] on [address].      Follow up appointments:  Will be needed - PCP; Renal; cardiology; Vascular;  ID?     DME needed:  TBD; at this time patient has all of his home hemodialysis equipment and supplies and he gets continued shipments that are arranged through [facility] on [address]; patient has a blood pressure monitoring unit.      Preferred Rx is [facility] [address] [facility].     Transportation at Discharge: his car is here as he drove himself here the day of his admission     Keys or means to access home:  Both patient and sister Ingrid Ramos [phone] has keys to the apt,        IM Medicare letter:   2nd IM Letter will be needed prior to discharge     Caregiver Contact:  Sister, Rosa Argueta [phone], was contacted today by CM. Tessa Rojo provided as much information as she could     Discharge Caregiver contacted prior to [address], Rosa Argueta [phone] will be contacted closer to discharge    Jessy Cuenca, [address] [place] [address].  Ext [phone]

## 2021-04-09 NOTE — OP NOTES
[address]  OPERATIVE REPORT    Name:  Vimal Méndez  MR#:  [id number]  :  1975  ACCOUNT #:  [de-identified]  DATE OF SERVICE:  2021      PREOPERATIVE DIAGNOSIS:  Infected left arm dialysis graft. POSTOPERATIVE DIAGNOSIS:  Possibly infected left arm dialysis graft. PROCEDURE PERFORMED:  1. Exploration and culture of left arm dialysis graft. 2.  Open surgical thrombectomy of left arm dialysis graft. 3.  Angiograms of left arm dialysis graft. 4.  Angioplasty of dialysis graft. 5.  Selective arteriogram.  6.  First order arterial catheterization of the brachial artery. SURGEON:  Candy Luo MD    ASSISTANT:  None. ANESTHESIA:  General.    COMPLICATIONS: Clotting of dialysis graft. SPECIMENS REMOVED:  Aerobic and anaerobic cultures x2. DRAINS:  None. IMPLANTS:  None. ESTIMATED BLOOD LOSS:  450 mL. INDICATIONS:  The patient is a 42-year-old male with a complex medical history who has a functional left arm dialysis graft. He has had persistent bacteremia and so there is a question of infection of the graft. An ultrasound of the graft suggest possible areas of perigraft fluid, but it is somewhat inconclusive. Because the patient has limited options for further dialysis access, it must be determined if the graft is clearly infected prior to explantation. A decision was made to bring the patient to the operating room and surgically explore the areas found on ultrasound and culture any perigraft fluid present and explant the graft if needed. PROCEDURE:  After informed consent was obtained, the patient was maintained on his current broad-spectrum intravenous antibiotics. He was taken to the operating room and after induction of adequate general anesthesia, the left arm was prepped and draped as a sterile field.   A small transverse incision was made over the lateral portion of the graft after ultrasound revealed that this was an area which showed possible perigraft fluid. Dissection was carried down to the graft, where for about 270 degrees of the circumference of the graft, it was nicely incorporated, but in a small area, the surrounding soft tissue was not firmly adherent to the graft but there was no perigraft fluid and there was no gross evidence of infection, so this area was cultured for aerobic and anaerobic organisms. In order to make this exploration more reliable in terms of determining whether or not the graft was infected, I decided to explore another area in a different portion of the graft. This first location was covered with an antibiotic-soaked sponge and on the medial aspect of the graft, a similar area was identified with ultrasound, where there might be some perigraft fluid. Dissection was carried down through a small transverse incision here and the graft was densely and completely incorporated in this area. A small graftotomy was made. In order to repair this, the graft was compressed on the arterial side and the venous side manually and then this puncture site was closed with a 5-0 Prolene figure-of-eight suture. The patient is allergic to HEPARIN, so no anticoagulation was given. Unfortunately, following this, the graft had clotted. I thought it might be best to try a percutaneous declot, so that as the graft was declotted, flow would be restored to the graft and it would hopefully be possible to declot the graft without having to anticoagulate the patient due to his HEPARIN allergy. The graft was cannulated on both the arterial and venous sides in opposing directions and 7-Luxembourgish sheaths were placed. Catheter was advanced up the venous side of the graft and an angiogram was performed with a Berenstein catheter in the axillary vein to confirm proper location. Next, the entire venous side of the graft and the native brachial veins were angioplastied with a 7 x 8 balloon.     Next, through the arterial sheath, a wire was advanced into the brachial artery and a selective arteriogram was done. The artery was normal, but the arterial side of the graft was completely occluded. A #4 over the wire Vick was passed several times, but inflow could not be restored. After several attempts at this time for percutaneous declot, it was apparent this would not be successful. The patient was anticoagulated with Angiomax. The two sheaths were removed and the puncture sites were closed with figure-of-eight 3-0 nylon sutures. The area of the previous graftotomy was opened by removing the Prolene suture and an open surgical thrombectomy was performed by passing the Vick catheters down the arterial and venous sides and removing large amounts of thrombus. Venous backbleeding was obtained. Good arterial inflow was obtained. Angiograms of the graft were done through this open access and showed that the graft was now normal.  The graftotomy was closed with running 5-0 Prolene suture, but even with the Angiomax clot was forming in the graft. Prior to completing the final sutures for the closure, the Fogartys were again passed down each side of the graft and the graft was flushed forward and then back bled and the closure was completed. Flow was released to the graft and there was a good palpable thrill. The area of graft closure was hemostatic. The wound at this site had been cultured for aerobic and anaerobic organisms as well and so now both incisions were closed with running 3-0 Vicryl suture and skin staples and dry dressings were applied. The patient was transferred to the recovery area in stable condition. All counts were correct.         Sindhu Connell MD      [id number]  D:  04/09/2021 6:02  T:  04/09/2021 7:37  JOB #:  [id number]

## 2021-04-09 NOTE — PROGRESS NOTES
End of Shift Note    Bedside shift change report given to Sybil Oconnell RN (oncoming nurse) by Akash Qiu RN (offgoing nurse). Report included the following information SBAR, Kardex, Intake/Output, MAR and Recent Results    Shift worked:  [id number]     Shift summary and any significant changes:     Pt up to chair and ambulating independently this shift. Seen by ID who requested paired cultures. Successfully geoffrey 1 set after multiple attempts. Alerted nightshift RN to possibly attempt more with AM labs. Pt given PRNs for c/o pain per MAR.  Pt to be seen by nuclear med in the AM. MD Attar still working on the [facility] transfer   Concerns for physician to address:  none     Zone phone for oncoming shift:   Justus Tristan, RN

## 2021-04-09 NOTE — PROGRESS NOTES
Pharmacy Daily Dosing of Warfarin    Indication: AFib    Goal INR: 2-3    PTA Warfarin Dose: warfarin 2.5 mg daily    Concurrent anticoagulants: NA    Concurrent antiplatelet: aspirin    Major Interacting Medications   Drugs that may increase INR: NA  Drugs that may decrease INR: NA    Conditions that may increase/decrease INR (CHF, C. diff, cirrhosis, thyroid disorder, hypoalbuminemia): NA    Labs:  Recent Labs     04/09/21  0310 04/08/21  0226 04/07/21  0148   INR 2.1* 2.3* 2.1*   HGB 8.8*  --  8.1*     --  252     Impression/Plan:   Warfain 2.5 mg PO x 1 today   Daily INR  CBC w/o diff MWF     Pharmacy will follow daily and adjust the dose as appropriate.     Thanks  Dominguez Duran, [facility]

## 2021-04-09 NOTE — PROGRESS NOTES
+.      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 Operative findings not suggestive of infection, and the gram stain and cultures from the graft have been negative. -CT scan of the lumbar spine is not showing signs of osteomyelitis  -WBC tagged scan :  shows no abscess or ICD associated infection.  -continue IV vancomycin. -ID recommends ICD removal.  Currently waiting for bed at [facility] - (transfer center [phone]). 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 Code status: Full  Prophylaxis: Coumadin  Recommended Disposition: Home w/Family     Subjective:     Chief Complaint / Reason for Physician Visit   To follow-up with Mr. Christiano Ricardo with gram-positive bacteremia (MSSE recurrent) with history ESRD dialysis via left AV graft, ischemic cardiopathy s/p ICD, HLD, gout.       Review of Systems:  Symptom Y/N Comments  Symptom Y/N Comments   Fever/Chills n   Chest Pain n    Poor Appetite    Edema     Cough n   Abdominal Pain n    Sputum n   Joint Pain     SOB/ZAMUDIO n   Pruritis/Rash     Nausea/vomit n   Tolerating PT/OT     Diarrhea n   Tolerating Diet     Constipation    Other       Could NOT obtain due to:      Objective: VITALS:   Last 24hrs VS reviewed since prior progress note. Most recent are:  Patient Vitals for the past 24 hrs:   Temp Pulse Resp BP SpO2   04/09/21 1453 98.5 °F (36.9 °C) 100 16 (!) 155/81 97 %   04/09/21 1400 98.8 °F (37.1 °C) 96 16 (!) 152/86 100 %   04/09/21 1312 98.2 °F (36.8 °C) 90 16 (!) 148/86    04/09/21 1300  87 16 (!) 144/87    04/09/21 1245  82 16 128/87    04/09/21 1230  83 16 136/76    04/09/21 1215  84 16 131/79    04/09/21 1200  82 16 (!) 147/77    04/09/21 1145  76 16 127/72    04/09/21 1130  79 16 132/81    04/09/21 1115  78 16 123/82    04/09/21 1100  79 16 126/82    04/09/21 1045  80 16 128/81    04/09/21 1030  83 16 123/78    04/09/21 1015  79 16 116/76    04/09/21 1000  83 16 123/80    04/09/21 0942 98.2 °F (36.8 °C) 88 16 135/89    04/09/21 0801 98.3 °F (36.8 °C) 86 17 130/81 97 %   04/09/21 0444 98.3 °F (36.8 °C) 88 18 (!) 140/86 100 %   04/09/21 0052 99.2 °F (37.3 °C) 91 16 120/75 97 %   04/08/21 2015 98.5 °F (36.9 °C) 84 16 125/84 100 %       Intake/Output Summary (Last 24 hours) at 4/9/2021 1957  Last data filed at 4/9/2021 1312  Gross per 24 hour   Intake 360 ml   Output 2500 ml   Net -2140 ml        I had a face to face encounter and independently examined this patient on 4/9/2021, as outlined below:  PHYSICAL EXAM:  General: WD, WN. Alert, cooperative, no acute distress    EENT:  EOMI. Anicteric sclerae. MMM  Resp:  CTA bilaterally, no wheezing or rales. No accessory muscle use  CV:  Regular  rhythm,  No edema  GI:  Soft, Non distended, Non tender. +Bowel sounds  Neurologic:  Alert and oriented X 3, normal speech,   Psych:   Good insight. Not anxious nor agitated  Skin:  No rashes.   No jaundice    Reviewed most current lab test results and cultures  YES  Reviewed most current radiology test results   YES  Review and summation of old records today    NO  Reviewed patient's current orders and MAR    YES  PMH/SH reviewed - no change compared to H&P  ________________________________________________________________________  Care Plan discussed with:    Comments   Patient x    Family      RN x    Care Manager x    Consultant                       x Multidiciplinary team rounds were held today with , nursing, pharmacist and clinical coordinator. Patient's plan of care was discussed; medications were reviewed and discharge planning was addressed. ________________________________________________________________________  Total NON critical care TIME:  35  Minutes    Total CRITICAL CARE TIME Spent:   Minutes non procedure based      Comments   >50% of visit spent in counseling and coordination of care x    ________________________________________________________________________  Carrie Moses MD     Procedures: see electronic medical records for all procedures/Xrays and details which were not copied into this note but were reviewed prior to creation of Plan. LABS:  I reviewed today's most current labs and imaging studies.   Pertinent labs include:  Recent Labs     04/09/21 0310 04/07/21 0148   WBC 11.9* 13.1*   HGB 8.8* 8.1*   HCT 28.0* 25.3*    252     Recent Labs     04/09/21 0310 04/08/21 0226 04/07/21 0148   *  --  133*   K 5.9*  --  5.5*   CL 94*  --  99   CO2 26  --  25   GLU 80  --  92   BUN 36*  --  41*   CREA 7.87*  --  7.88*   CA 9.5  --  9.3   INR 2.1* 2.3* 2.1*       Signed: Carrie Moses MD

## 2021-04-09 NOTE — PROCEDURES
[facility] Dialysis Team [facility]  [phone]    Vitals   Pre   Post   Assessment   Pre   Post     Temp  Temp: 98.2 °F (36.8 °C) (04/09/21 0942)  98.2, oral LOC  A&Ox4 A&Ox4   HR   Pulse (Heart Rate): 88 (04/09/21 0942) 90 Lungs   clear clear   B/P   BP: 135/89 (04/09/21 0942) 148/86 Cardiac   Tele, NSR Tele, NSR   Resp   Resp Rate: 16 (04/09/21 0942) 16 Skin   CDI CDI   Pain level  0 0 Edema  3+ BLE; facial edema 2+ BLE; facial edema   Orders:    Duration:   Start:    2379 End:    1312 Total:   3.5hr   Dialyzer:   Dialyzer/Set Up Inspection: Christie Adams (04/09/21 0942)   K Bath:   Dialysate K (mEq/L): 2 (04/09/21 0942)   Ca Bath:   Dialysate CA (mEq/L): 2.5 (04/09/21 0942)   Na/Bicarb:   Dialysate NA (mEq/L): 138 (04/09/21 0942)   Target Fluid Removal:   Goal/Amount of Fluid to Remove (mL): 2500 mL (04/09/21 0942)   Access     Type & Location:   SUZANNE AVG: skin CDI. No s/s of infection. + B/T. No issues with cannulation or hemostasis. Running well at .    Labs     Obtained/Reviewed   Critical Results Called   Date when labs were drawn-  Hgb-    HGB   Date Value Ref Range Status   04/09/2021 8.8 (L) 12.1 - 17.0 g/dL Final     K-    Potassium   Date Value Ref Range Status   04/09/2021 5.9 (H) 3.5 - 5.1 mmol/L Final     Ca-   Calcium   Date Value Ref Range Status   04/09/2021 9.5 8.5 - 10.1 MG/DL Final     Bun-   BUN   Date Value Ref Range Status   04/09/2021 36 (H) 6 - 20 MG/DL Final     Creat-   Creatinine   Date Value Ref Range Status   04/09/2021 7.87 (H) 0.70 - 1.30 MG/DL Final        Medications/ Blood Products Given     Name   Dose   Route and Time     none ordered                Blood Volume Processed (BVP):    76.8L Net Fluid   Removed:  2500ml   Comments   Time Out Done: 7493  Primary Nurse Rpt Pre: Nolvia Roldan, RN  Primary Nurse Rpt Post: Dorena Hodgkin, RN  Pt Education: need for blood draw for Nuclear med testing later today  Care Plan: onoging  Tx Summary:   Pt arrived to HD suite A&Ox4. Consent signed & on file. SBAR received from Primary RN.  6351 : Pt cannulated with 55K needles per policy & without issue. Labs drawn per request/ order. VSS. Dialysis Tx initiated. * no issues with HD treatment*  1312: Tx ended. VSS. All possible blood returned to patient. Hemostasis achieved without issue. Bed locked and in the lowest position, call bell and belongings in reach. SBAR given to Primary, RN. Patient is stable at time of their departure. All Dialysis related medications have been reviewed. Admiting Diagnosis: Bacteremia  Pt's previous clinic- [facility]  Consent signed - Informed Consent Verified: Yes (04/09/21 5248)  Winter Consent - on file  Hepatitis Status- Immune 2/10/21  Machine #- Machine Number: [id number] (04/09/21 0178)  Telemetry status- Tele, NSR  Pre-dialysis wt. -

## 2021-04-09 NOTE — PROGRESS NOTES
Pharmacy Automatic Renal Dosing Protocol - Antimicrobials    Indication for Antimicrobials: Bacteremia (MRSE) Hx of endocarditis     Current Regimen of Each Antimicrobial:  Vancomycin per HD protocol (Start date 3/29; day 12)    Previous Antimicrobial Therapy:    Goal Level: 20-25  (AUC: 400 - 600 mg/hr/Liter/day)     Date Dose & Interval Measured (mcg/mL) Extrapolated (mcg/mL)    500 mg IV post HD 19.1 --   4/5 500mg post HD 16.5 --           Date & time of next level: weekly-  Due prior to  dose  Monday - Not ordered     Significant Cultures:   3/27 bcx - MRSE  (final)  3/29 pbcx - MRSE (final)  3/30 blood1: CONS in 1 of 2. final  3/30 blood2: NG (final)   blood pair: staph epi in 2 of 4. final   wound: NG. FINAL    anaerobe:  NG - Final    bcx - ngsf (pending)    Radiology / Imaging results: (X-ray, CT scan or MRI): na    Paralysis, amputations, malnutrition: na    Labs:  Recent Labs     21  0310 21  0148   CREA 7.87* 7.88*   BUN 36* 41*   WBC 11.9* 13.1*     Temp (24hrs), Av.5 °F (36.9 °C), Min:98.2 °F (36.8 °C), Max:99.2 °F (37.3 °C)    Is the Patient on Dialysis? MWF    Creatinine Clearance (mL/min):   CrCl (Actual Body Weight): 11.4  CrCl (Adjusted Body Weight): 11.4  CrCl (Ideal Body Weight): 11.3    Impression/Plan:   Pre-HD vancomycin level subtherapeutic x 2. Increase post-HD dose of vancomycin to 750mg --- HD complete today. Patient tolerated. Will order vancomycin 750mg IV x 1 as planned. All cultures are NGTD , still pending   BMP MWF  Antimicrobial stop date: TBD. 6 weeks from first negative bcx     Pharmacy will follow daily and adjust medications as appropriate for renal function and/or serum levels. Thank you,  Chen Clark, PHARMD    Recommended duration of therapy  [email or web address]/[email or web address]. docx    Renal Dosing  [email or web address]/Renal%[id number]. pdf

## 2021-04-09 NOTE — PROGRESS NOTES
Nephrology Progress Note  Blaise Leblanc     [email or web address]  Phone - [phone]   Patient: Rebecca Castellanos    YOB: 1975        Date- 4/9/2021   Admit Date: 3/29/2021  CC: Follow up for ESRD        IMPRESSION & PLAN:   · End-stage renal disease home HD 5 days per week- Follows up with Dr Kaylyn Finengan at [facility]  · Failed RTX times 2  · Hyperkalemia  · Gram positive sepsis  · Anemia of ckd  · Hx of renal tx in past times 2.  · Hypertension  · CAD  · H/o DVT, s/p ivc filter/ h/o HIT     Plan:  -Plan for dialysis today.  -Plan to continue Monday Wednesday Friday dialysis  -Recent cultures negative so far  -Vancomycin per ID.  -Ongoing work up for source of infection.   -Plan for transfer to [facility] for Lead extraction. Subjective: Interval History:   -  Feels  Better today, Seen on HD    Objective:   Vitals:    04/09/21 1030 04/09/21 1045 04/09/21 1100 04/09/21 1115   BP: 123/78 128/81 126/82 123/82   Pulse: 83 80 79 78   Resp: 16 16 16 16   Temp:       TempSrc:       SpO2:       Weight:       Height:          04/08 0701 - 04/09 0700  In: 600 [P.O.:600]  Out: -   Last 3 Recorded Weights in this Encounter    04/05/21 0406 04/06/21 0458 04/07/21 0723   Weight: 68.5 kg (151 lb 0.2 oz) 66.3 kg (146 lb 2.6 oz) 66.8 kg (147 lb 4.3 oz)      Physical exam:   GEN: NAD  NECK- Supple, no mass  RESP: No wheezing, clear b/l  CVS: S1,S2  RRR  NEURO: Normal speech, nonfocal   EXT: No Edema   PSYCH: Normal Mood    Chart reviewed. Pertinent Notes reviewed. Data Review :  Recent Labs     04/09/21 0310 04/07/21 0148   * 133*   K 5.9* 5.5*   CL 94* 99   CO2 26 25   BUN 36* 41*   CREA 7.87* 7.88*   GLU 80 92   CA 9.5 9.3     Recent Labs     04/09/21 0310 04/07/21 0148   WBC 11.9* 13.1*   HGB 8.8* 8.1*   HCT 28.0* 25.3*    252     No results for input(s): FE, TIBC, PSAT, FERR in the last 72 hours.    Medication list  reviewed  Current Facility-Administered Medications   Medication Dose Route Frequency    warfarin (COUMADIN) tablet 2.5 mg  2.5 mg Oral ONCE    oxyCODONE-acetaminophen (PERCOCET) 5-325 mg per tablet 1 Tab  1 Tab Oral Q6H PRN    0.9% sodium chloride infusion 250 mL  250 mL IntraVENous PRN    bisacodyL (DULCOLAX) tablet 5 mg  5 mg Oral DAILY    polyethylene glycol (MIRALAX) packet 34 g  34 g Oral DAILY    melatonin tablet 3 mg  3 mg Oral QHS PRN    WARFARIN INFORMATION NOTE (COUMADIN)   Other QPM    Vancomycin - Pharmacy to dose   Other Rx Dosing/Monitoring    atorvastatin (LIPITOR) tablet 20 mg  20 mg Oral DAILY    aspirin chewable tablet 81 mg  81 mg Oral DAILY    metoprolol succinate (TOPROL-XL) XL tablet 12.5 mg  12.5 mg Oral DAILY    pantoprazole (PROTONIX) tablet 40 mg  40 mg Oral ACB    calcitRIOL (ROCALTROL) capsule 0.5 mcg  0.5 mcg Oral DAILY    sodium chloride (NS) flush 5-40 mL  5-40 mL IntraVENous Q8H    sodium chloride (NS) flush 5-40 mL  5-40 mL IntraVENous PRN    acetaminophen (TYLENOL) tablet 650 mg  650 mg Oral Q6H PRN    Or    acetaminophen (TYLENOL) suppository 650 mg  650 mg Rectal Q6H PRN    ondansetron (ZOFRAN ODT) tablet 4 mg  4 mg Oral Q8H PRN    Or    ondansetron (ZOFRAN) injection 4 mg  4 mg IntraVENous Q6H PRN    calcium acetate(phosphat bind) (PHOSLO) capsule 1,334 mg  2 Cap Oral TID WITH MEALS    epoetin emilie-epbx (RETACRIT) 12,000 Units combo injection  12,000 Units SubCUTAneous Q MON, WED & FRI          Rudy Awad MD              [facility]  [facility] [employer]  [address], [address]  [place], [address]  Phone - [phone]               Fax - [phone]

## 2021-04-09 NOTE — PROGRESS NOTES
Cardiology Progress Note  4/9/2021     Admit Date: 3/29/2021  Admit Diagnosis: Gram-positive bacteremia [R78.81]  Leucocytosis [D72.829]  ESRD (end stage renal disease) on dialysis ([facility] [address] [N18.6, Z99.2]  CC: none currently  Cardiac Assessment/Plan (per Dr Shyann Lindsay):   Usual cardiologist:  Aroldo Villeda MD     Assessment: 1. 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 Bacteremia 10/2020. EVER negative for overt endocarditis. BCX positive from 3/27, 3/29, 3/30. Negative 3/30, 4/1. Now on vancomycin (methicillin resistant). 2. Remote SJM single chamber ICD implant (dual coil shock lead) in 2009 with gen change in 2016 due to battery depletion, subsequent diagnosis of lead fracture, system turned OFF, subcutaneous [employer] ICD implanted at [facility]. No evidence of S-ICD pocket infection. 3. Ischemic cardiomyopathy, primary prevention indication for ICD implant. 4. CAD with anterior MI and remote stenting in 2007. Chronic aspirin therapy. 5. Chronic systolic CHF. 6. Remote history of endocarditis. 7. Remote history of IVC filter due to lower extremity DVT. Chronic anticoagulation with warfarin. 8. ESRD with dialysis. Remote renal transplant x 2 due to renal failure with nephrotic syndrome. Failed transplants, chronic hemodialysis with history of access issues, using LUE AV graft currently. 9. History of heparin associated thrombocytopenia. 10. Anemia due to chronic renal disease. Hgb 6.1 on 4/2. Normal platelets. 11. Iodine contrast reaction (per notes). 12. Full code.     Plan:      1. Endovascular SJM ICD extraction is reasonable to consider but not without substantial risk--this would have to be done outside of [facility]. Discussed with ID would be great if the subcutaneous ICD could stay in place, but that could come out too if needed at lower risk. .  2. He is not pacemaker-dependent nor has he received therapy for VT-VF, this is a primary prevention device implant.     S/p AV graft surgery without obvious infection. Dr. Gwen Mccormick was unable to arrange procedure in [address] [place] [address].     It seems that he's gotten his latest cardiology care at [address] [place] [address], so can contact them regarding appropriateness for extraction. The number for [facility] EP is [phone], choose option 5 for physician to physician communication. I have called the above number this AM 4/5  and discussed with Dr. Basilia Gibson, patients [address] [place] [address] electrophysiologist.  He accepts patient for transfer to [address] [place] [address] [phone]. Will ask hospitalist to contact transfer center. There was some thought about discharging patient to see Dr. Tanmay Rascon as outpt, but after discussion with ID, due to persistent bacteremia she feels that this would be to high risk. Patient should remain in hospital until can be transferred to [address] [place] [address] for ICD removal.    Personally discussed again with Dr. Tanmay Rascon and Dr. Debra Tran 4/8    Awaiting transfer to [address] [place] [address]. Dr. Mayo Miles available as needed. Cardiac meds: asa; lipitor 20; toprol Xl 12.5; coumadin. Receiving Tx today. For other plans, see orders.   Hospital problem list     Active Hospital Problems    Diagnosis Date Noted    Leucocytosis 03/29/2021    Gram-positive bacteremia 03/29/2021    ESRD (end stage renal disease) on dialysis ([facility] [address] 05/14/2013        Subjective: Mica Ling reports       Chest pain X none  consistent with:  Non-cardiac CP         Atypical CP     None now  On going  Anginal CP     Dyspnea X none  at rest  with exertion         improved  unchanged  worse              PND X none  overnight       Orthopnea X none  improved  unchanged  worse   Presyncope X none  improved  unchanged  worse     Ambulated in hallway without symptoms   Yes   Ambulated in room without symptoms  Yes   Objective:     Physical Exam:  Overall VSSAF;    Visit Vitals  BP (!) 140/86   Pulse 88   Temp 98.3 °F (36.8 °C)   Resp 18   Ht 5' 7\" (1.702 m)   Wt 66.8 kg (147 lb 4.3 oz) SpO2 100%   BMI 23.07 kg/m²     Temp (24hrs), Av.8 °F (37.1 °C), Min:98.3 °F (36.8 °C), Max:99.3 °F (37.4 °C)    Patient Vitals for the past 8 hrs:   Pulse   21 0444 88   21 91     Patient Vitals for the past 8 hrs:   Resp   214 18   21 16     Patient Vitals for the past 8 hrs:   BP   21 0444 (!) 140/86   21 120/75       Intake/Output Summary (Last 24 hours) at 2021 0724  Last data filed at 2021 1002  Gross per 24 hour   Intake 240 ml   Output    Net 240 ml     General Appearance: Well developed, well nourished, no acute distress. Ears/Nose/Mouth/Throat:   Normal MM; anicteric. JVP: WNL   Resp:   Few basal crackles. Nl resp effort. Cardiovascular:  RRR, S1, S2 normal, no new murmur. No gallop or rub. Abdomen:   Soft, non-tender, bowel sounds are present. Extremities: Mild edema bilaterally. Skin:  Neuro: Warm and dry. A/O x3, grossly nonfocal       cath site intact w/o hematoma or new bruit; distal pulse unchanged  Yes   Data Review:     Telemetry independently reviewed x sinus  chronic afib  parox afib  NSVT     ECG independently reviewed  NSR  afib  no significant changes  NSST-Tw chgs     x no new ECG provided for review   Lab results reviewed as noted below. Current medications reviewed as noted below. No results for input(s): PH, PCO2, PO2 in the last 72 hours. No results for input(s): CPK, CKMB, CKNDX, TROIQ in the last 72 hours.   Recent Labs     21  0310 21  0148   * 133*   K 5.9* 5.5*   CL 94* 99   CO2 26 25   BUN 36* 41*   CREA 7.87* 7.88*   GFRAA 9* 9*   GLU 80 92   CA 9.5 9.3   WBC 11.9* 13.1*   HGB 8.8* 8.1*   HCT 28.0* 25.3*    252     Lab Results   Component Value Date/Time    Cholesterol, total 139 2012 05:00 PM    HDL Cholesterol 32 2012 05:00 PM    LDL, calculated 75.8 2012 05:00 PM    Triglyceride 156 (H) 2012 05:00 PM    CHOL/HDL Ratio 4.3 2012 05:00 PM No results for input(s): AP, TBIL, TP, ALB, GLOB, GGT, AML, LPSE in the last 72 hours.     No lab exists for component: SGOT, GPT, AMYP, HLPSE  Recent Labs     04/09/21  0310 04/08/21  0226 04/07/21  0148   INR 2.1* 2.3* 2.1*   PTP 21.7* 22.9* 21.7*      No components found for: GLPOC    Current Facility-Administered Medications   Medication Dose Route Frequency    oxyCODONE-acetaminophen (PERCOCET) 5-325 mg per tablet 1 Tab  1 Tab Oral Q6H PRN    0.9% sodium chloride infusion 250 mL  250 mL IntraVENous PRN    bisacodyL (DULCOLAX) tablet 5 mg  5 mg Oral DAILY    polyethylene glycol (MIRALAX) packet 34 g  34 g Oral DAILY    melatonin tablet 3 mg  3 mg Oral QHS PRN    WARFARIN INFORMATION NOTE (COUMADIN)   Other QPM    Vancomycin - Pharmacy to dose   Other Rx Dosing/Monitoring    atorvastatin (LIPITOR) tablet 20 mg  20 mg Oral DAILY    aspirin chewable tablet 81 mg  81 mg Oral DAILY    metoprolol succinate (TOPROL-XL) XL tablet 12.5 mg  12.5 mg Oral DAILY    pantoprazole (PROTONIX) tablet 40 mg  40 mg Oral ACB    calcitRIOL (ROCALTROL) capsule 0.5 mcg  0.5 mcg Oral DAILY    sodium chloride (NS) flush 5-40 mL  5-40 mL IntraVENous Q8H    sodium chloride (NS) flush 5-40 mL  5-40 mL IntraVENous PRN    acetaminophen (TYLENOL) tablet 650 mg  650 mg Oral Q6H PRN    Or    acetaminophen (TYLENOL) suppository 650 mg  650 mg Rectal Q6H PRN    ondansetron (ZOFRAN ODT) tablet 4 mg  4 mg Oral Q8H PRN    Or    ondansetron (ZOFRAN) injection 4 mg  4 mg IntraVENous Q6H PRN    calcium acetate(phosphat bind) (PHOSLO) capsule 1,334 mg  2 Cap Oral TID WITH MEALS    epoetin emilie-epbx (RETACRIT) 12,000 Units combo injection  12,000 Units SubCUTAneous Q MON, WED & Masoud Garcia MD

## 2021-04-09 NOTE — PROGRESS NOTES
HD TRANSFER - OUT REPORT:    Verbal report given to Teressa Cabot, RN on Alejandra Garza being transferred to [facility] for routine progression of care       Report consisted of patient's Situation, Background, Assessment and   Recommendations(SBAR). Information from the following report(s) SBAR, Procedure Summary, Intake/Output, MAR and Recent Results was reviewed with the receiving nurse. Method:  $$ Method: Hemodialysis (04/09/21 4284)    Fluid Removed  NET Fluid Removed (mL): 2500 ml (04/09/21 1312)     Patient response to treatment:  Stable    End Time  Hemodialysis End Time: 4535 (04/09/21 1312)  If not documented, dialysis nurse to update post-dialysis row in HD/Filtration flowsheet     Medications /Volume expansion agents or Fluid boluses administered during treatment? no    Post-dialysis medication administration due?  yes  Remind nurse to administer post-HD medication upon return to unit. Fistula hemostasis? yes    Line heparinization? no    Lines: SUZANNE AVG    Opportunity for questions and clarification was provided.       Patient transported with: [email or web address]

## 2021-04-10 VITALS
DIASTOLIC BLOOD PRESSURE: 83 MMHG | TEMPERATURE: 99.1 F | HEIGHT: 67 IN | HEART RATE: 90 BPM | BODY MASS INDEX: 23.11 KG/M2 | OXYGEN SATURATION: 98 % | WEIGHT: 147.27 LBS | RESPIRATION RATE: 17 BRPM | SYSTOLIC BLOOD PRESSURE: 140 MMHG

## 2021-04-10 PROCEDURE — 74011250637 HC RX REV CODE- 250/637: Performed by: STUDENT IN AN ORGANIZED HEALTH CARE EDUCATION/TRAINING PROGRAM

## 2021-04-10 PROCEDURE — 94760 N-INVAS EAR/PLS OXIMETRY 1: CPT

## 2021-04-10 PROCEDURE — 99233 SBSQ HOSP IP/OBS HIGH 50: CPT | Performed by: STUDENT IN AN ORGANIZED HEALTH CARE EDUCATION/TRAINING PROGRAM

## 2021-04-10 RX ORDER — CALCITRIOL 0.25 UG/1
0.5 CAPSULE ORAL DAILY
Status: CANCELLED | OUTPATIENT
Start: 2021-04-11

## 2021-04-10 RX ORDER — SODIUM CHLORIDE 9 MG/ML
250 INJECTION, SOLUTION INTRAVENOUS AS NEEDED
Status: CANCELLED | OUTPATIENT
Start: 2021-04-10

## 2021-04-10 RX ORDER — METOPROLOL SUCCINATE 25 MG/1
12.5 TABLET, EXTENDED RELEASE ORAL DAILY
Status: CANCELLED | OUTPATIENT
Start: 2021-04-11

## 2021-04-10 RX ORDER — ONDANSETRON 2 MG/ML
4 INJECTION INTRAMUSCULAR; INTRAVENOUS
Status: CANCELLED | OUTPATIENT
Start: 2021-04-10

## 2021-04-10 RX ORDER — OXYCODONE AND ACETAMINOPHEN 5; 325 MG/1; MG/1
1 TABLET ORAL
Status: CANCELLED | OUTPATIENT
Start: 2021-04-10

## 2021-04-10 RX ORDER — LANOLIN ALCOHOL/MO/W.PET/CERES
3 CREAM (GRAM) TOPICAL
Status: CANCELLED | OUTPATIENT
Start: 2021-04-10

## 2021-04-10 RX ORDER — ATORVASTATIN CALCIUM 20 MG/1
20 TABLET, FILM COATED ORAL DAILY
Status: CANCELLED | OUTPATIENT
Start: 2021-04-11

## 2021-04-10 RX ORDER — ACETAMINOPHEN 325 MG/1
650 TABLET ORAL
Status: CANCELLED | OUTPATIENT
Start: 2021-04-10

## 2021-04-10 RX ORDER — POLYETHYLENE GLYCOL 3350 17 G/17G
34 POWDER, FOR SOLUTION ORAL DAILY
Status: CANCELLED | OUTPATIENT
Start: 2021-04-11

## 2021-04-10 RX ORDER — PANTOPRAZOLE SODIUM 40 MG/1
40 TABLET, DELAYED RELEASE ORAL
Status: CANCELLED | OUTPATIENT
Start: 2021-04-11

## 2021-04-10 RX ORDER — BISACODYL 5 MG
5 TABLET, DELAYED RELEASE (ENTERIC COATED) ORAL DAILY
Status: CANCELLED | OUTPATIENT
Start: 2021-04-11

## 2021-04-10 RX ORDER — ACETAMINOPHEN 650 MG/1
650 SUPPOSITORY RECTAL
Status: CANCELLED | OUTPATIENT
Start: 2021-04-10

## 2021-04-10 RX ORDER — ONDANSETRON 4 MG/1
4 TABLET, ORALLY DISINTEGRATING ORAL
Status: CANCELLED | OUTPATIENT
Start: 2021-04-10

## 2021-04-10 RX ORDER — GUAIFENESIN 100 MG/5ML
81 LIQUID (ML) ORAL DAILY
Status: CANCELLED | OUTPATIENT
Start: 2021-04-11

## 2021-04-10 RX ORDER — CALCIUM ACETATE 667 MG/1
2 CAPSULE ORAL
Status: CANCELLED | OUTPATIENT
Start: 2021-04-10

## 2021-04-10 RX ADMIN — OXYCODONE HYDROCHLORIDE AND ACETAMINOPHEN 1 TABLET: 5; 325 TABLET ORAL at 05:09

## 2021-04-10 RX ADMIN — METOPROLOL SUCCINATE 12.5 MG: 25 TABLET, EXTENDED RELEASE ORAL at 08:51

## 2021-04-10 RX ADMIN — ATORVASTATIN CALCIUM 20 MG: 20 TABLET, FILM COATED ORAL at 08:50

## 2021-04-10 RX ADMIN — ACETAMINOPHEN 650 MG: 325 TABLET, FILM COATED ORAL at 08:50

## 2021-04-10 RX ADMIN — CALCIUM ACETATE 1334 MG: 667 CAPSULE ORAL at 08:50

## 2021-04-10 RX ADMIN — PANTOPRAZOLE SODIUM 40 MG: 40 TABLET, DELAYED RELEASE ORAL at 08:50

## 2021-04-10 RX ADMIN — Medication 10 ML: at 17:26

## 2021-04-10 RX ADMIN — OXYCODONE HYDROCHLORIDE AND ACETAMINOPHEN 1 TABLET: 5; 325 TABLET ORAL at 11:43

## 2021-04-10 RX ADMIN — Medication 10 ML: at 05:09

## 2021-04-10 RX ADMIN — CALCIUM ACETATE 1334 MG: 667 CAPSULE ORAL at 18:04

## 2021-04-10 RX ADMIN — OXYCODONE HYDROCHLORIDE AND ACETAMINOPHEN 1 TABLET: 5; 325 TABLET ORAL at 18:04

## 2021-04-10 RX ADMIN — ASPIRIN 81 MG: 81 TABLET, CHEWABLE ORAL at 08:50

## 2021-04-10 RX ADMIN — CALCITRIOL CAPSULES 0.25 MCG 0.5 MCG: 0.25 CAPSULE ORAL at 08:50

## 2021-04-10 RX ADMIN — CALCIUM ACETATE 1334 MG: 667 CAPSULE ORAL at 12:02

## 2021-04-10 NOTE — PROGRESS NOTES
Night shift nurse stated she was calling PICC team as she was unable to draw labs this AM.  This nurse called PICC team to follow up and their voicemail states they are unavailable this weekend.

## 2021-04-10 NOTE — PROGRESS NOTES
Attempted to call report to receiving nurse at [facility]. Waiting on hold for over 20 minutes. Will call back shortly.

## 2021-04-10 NOTE — PROGRESS NOTES
Paged cardiology as the EMTALA form is not completed and there are no orders to transfer the patient to [address] [place] [address]. Cardiologist on call stated that if the NP tonight is not able to put in the orders and fill out the EMTALA form then he will do it when he comes in on 4/10 to fill out the form and put in the orders.

## 2021-04-10 NOTE — PROGRESS NOTES
+.      Hospitalist Progress Note    NAME: Rebecca Castellanos   :  1975   MRN:  [id number]       Assessment / Plan:  Sepsis due to MRSE bacteremia  Presumed endovascular ICD infection (S/P 2 AICD placements)  -hx MRSE bacteremia 10/2020, presumed from femoral HD cath with discitis L1-2  -hx MRSA bacteremia   -blood cultures 3/27, 3/29, 3/30 and  pos MRSE  -TTE with mobile structure in the left ventricle, ejection fraction is 30%  -S/p AV graft exploration on . Operative findings not suggestive of infection, and the gram stain and cultures from the graft have been negative. -CT scan of the lumbar spine is not showing signs of osteomyelitis  -WBC tagged scan :  shows no abscess or ICD associated infection.  -continue IV vancomycin. -ID recommends ICD removal.  Currently waiting for bed at [facility] - (transfer center [phone]). Case discussed with  Savana Macias    - completing EMTALA. Constipation  -bowel regimen    End-stage renal disease  Anemia  -HD and retacrit per renal    Hypertension   CAD  ICMP EF 30-35% s/p ICD placement  Continue asa, lipitor and metoprolol    History of DVT and graft thrombosis on warfarin  History of HIT  Continue warfarin    GERD  Continue pantoprazole  Gout          18.5 - 24.9 Normal weight / Body mass index is 23.07 kg/m². Code status: Full  Prophylaxis: Coumadin  Recommended Disposition: Home w/Family     Subjective:     Chief Complaint / Reason for Physician Visit   To follow-up with Mr. Kevin Mahmood with gram-positive bacteremia (MSSE recurrent) with history ESRD dialysis via left AV graft, ischemic cardiopathy s/p ICD, HLD, gout.       Review of Systems:  Symptom Y/N Comments  Symptom Y/N Comments   Fever/Chills n   Chest Pain n    Poor Appetite    Edema     Cough n   Abdominal Pain n    Sputum n   Joint Pain     SOB/ZAMUDIO n   Pruritis/Rash     Nausea/vomit n   Tolerating PT/OT     Diarrhea n   Tolerating Diet     Constipation    Other       Could NOT obtain due to:      Objective:     VITALS:   Last 24hrs VS reviewed since prior progress note. Most recent are:  Patient Vitals for the past 24 hrs:   Temp Pulse Resp BP SpO2   04/10/21 0845 98.8 °F (37.1 °C) 93 17 112/76 94 %   04/10/21 0400 97.9 °F (36.6 °C) 90 16 115/72 98 %   04/10/21 0001 98 °F (36.7 °C) 98 16 109/70 97 %   04/09/21 2047 98 °F (36.7 °C) 97 16 106/68 97 %   04/09/21 1915 98.1 °F (36.7 °C) 94 16 101/70 97 %   04/09/21 1453 98.5 °F (36.9 °C) 100 16 (!) 155/81 97 %   04/09/21 1400 98.8 °F (37.1 °C) 96 16 (!) 152/86 100 %   04/09/21 1312 98.2 °F (36.8 °C) 90 16 (!) 148/86    04/09/21 1300  87 16 (!) 144/87    04/09/21 1245  82 16 128/87    04/09/21 1230  83 16 136/76    04/09/21 1215  84 16 131/79    04/09/21 1200  82 16 (!) 147/77    04/09/21 1145  76 16 127/72    04/09/21 1130  79 16 132/81    04/09/21 1115  78 16 123/82    04/09/21 1100  79 16 126/82    04/09/21 1045  80 16 128/81    04/09/21 1030  83 16 123/78    04/09/21 1015  79 16 116/76        Intake/Output Summary (Last 24 hours) at 4/10/2021 1007  Last data filed at 4/9/2021 1915  Gross per 24 hour   Intake 240 ml   Output 2500 ml   Net -2260 ml        I had a face to face encounter and independently examined this patient on 4/10/2021, as outlined below:  PHYSICAL EXAM:  General: WD, WN. Alert, cooperative, no acute distress    EENT:  EOMI. Anicteric sclerae. MMM  Resp:  CTA bilaterally, no wheezing or rales. No accessory muscle use  CV:  Regular  rhythm,  No edema  GI:  Soft, Non distended, Non tender. +Bowel sounds  Neurologic:  Alert and oriented X 3, normal speech,   Psych:   Good insight. Not anxious nor agitated  Skin:  No rashes.   No jaundice    Reviewed most current lab test results and cultures  YES  Reviewed most current radiology test results   YES  Review and summation of old records today    NO  Reviewed patient's current orders and MAR    YES  PMH/SH reviewed - no change compared to H&P  ________________________________________________________________________  Care Plan discussed with:    Comments   Patient x    Family      RN x    Care Manager x    Consultant                       x Multidiciplinary team rounds were held today with , nursing, pharmacist and clinical coordinator. Patient's plan of care was discussed; medications were reviewed and discharge planning was addressed. ________________________________________________________________________  Total NON critical care TIME:  35  Minutes    Total CRITICAL CARE TIME Spent:   Minutes non procedure based      Comments   >50% of visit spent in counseling and coordination of care x    ________________________________________________________________________  Pearl Montanez MD     Procedures: see electronic medical records for all procedures/Xrays and details which were not copied into this note but were reviewed prior to creation of Plan. LABS:  I reviewed today's most current labs and imaging studies.   Pertinent labs include:  Recent Labs     04/09/21 0310   WBC 11.9*   HGB 8.8*   HCT 28.0*        Recent Labs     04/09/21 0310 04/08/21 0226   *  --    K 5.9*  --    CL 94*  --    CO2 26  --    GLU 80  --    BUN 36*  --    CREA 7.87*  --    CA 9.5  --    INR 2.1* 2.3*       Signed: Pearl Montanez MD

## 2021-04-10 NOTE — PROGRESS NOTES
Infectious Disease Progress Note             Subjective:   Patient repots feeling alright. Still having left-sided neck pain. He is also reporting pain just above the left subcu-ICD area that started about 3 days ago. Pains more when palpated on it. Patient is awaiting transfer to [facility] at this time. Objective:    Vitals:   Patient Vitals for the past 24 hrs:   Temp Pulse Resp BP SpO2   04/10/21 0845 98.8 °F (37.1 °C) 93 17 112/76 94 %   04/10/21 0400 97.9 °F (36.6 °C) 90 16 115/72 98 %   04/10/21 0001 98 °F (36.7 °C) 98 16 109/70 97 %   04/09/21 2047 98 °F (36.7 °C) 97 16 106/68 97 %   04/09/21 1915 98.1 °F (36.7 °C) 94 16 101/70 97 %   04/09/21 1453 98.5 °F (36.9 °C) 100 16 (!) 155/81 97 %   04/09/21 1400 98.8 °F (37.1 °C) 96 16 (!) 152/86 100 %   04/09/21 1312 98.2 °F (36.8 °C) 90 16 (!) 148/86    04/09/21 1300  87 16 (!) 144/87    04/09/21 1245  82 16 128/87    04/09/21 1230  83 16 136/76    04/09/21 1215  84 16 131/79    04/09/21 1200  82 16 (!) 147/77    04/09/21 1145  76 16 127/72        Physical Exam:  ? GEN: NAD  ? HEENT: Normocephalic, atraumatic, PERRL, no scleral icterus, no visible inflammation at the left neck region. ? CV: HDS, some tenderness at the subcutaneous -ICD on palpation. ? Lungs: Clear to auscultation bilaterally  ? Genitourinary: no oneal  ? Extremities: no edema  ? Neuro: Alert, oriented to time, place and situation, moves all extremities to commands, verbal   ? Psych: good affect, good eye contact, non tearful   ? Lines: left arm AV graft         Labs:  No results found for this or any previous visit (from the past 24 hour(s)). Micro:  3/27: Blood cultures 4/4 bottles with Staph epidermidis (Methicillin-R),  (Vanc ZECHARIAH 1ug/mL) , Willian Cov 2 negative   3/29: Blood cultures 4/4 bottles positive for MRSE  3/30: Blood cultures with MRSE  4/1: Blood cultures with MRSE (Vanc ZECHARIAH 1ug/mL)  4/2: AV graft fluid: No WBCs, no organisms on gram stain.  Cultures negative. 4/8: Blood cultures positive for GPC in clusters. Assessment:  38 yo M with:        - Sepsis due to high-grade MRSE bacteremia this admission.   - CAD with MI with 2 AICD placements (2009 - non-functioning due to fractured lead, then a subcutaneous ICD placement in 2019): patient has both devices in him. 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 - S/p AV graft exploration on 4/2. Operative findings not suggestive of infection, and the gram stain and cultures from the graft have been negative. - Nephrotic syndome s/p 2 renal transplants. Both transplants failed due to graft-medication non-compliance (last transplant in 2001). - ESRD on iHD, 5 times a week from home since 2012. In 09/2020 had left AV graft placed since complicated by clotting issues. -  MSSA bacteremia in 2017  - MRSE bacteremia in 10/2020 likely femoral HD line-related and possible L1-L2 discitis/osteomyelitis treated with 6 weeks of vancomycin. Since 11/2020, the graft is being used for dialysis.       Recommendations:  - Patient needs ICD removal on an urgent basis. He is bacteremic. I have spoken to EP cardiology at [facility] Dr. Rob Douglass 4/8/21. Patient has been accepted there but is awaiting beds. Also spoke to [facility] transfer center and have clearly stated that this is not a regular medical situation, and it needs an urgent transfer for the procedure. This is a high-morbidity, mortality infection, and needs urgent transfer for the endovascular ICD removal. Even if the EVER was negative, the endovascular ICD is presumed infected given the high degree of the bacteremia and no other source. - Blood cultures from 4/8 are also positive for GPC in clusters.  All his prior blood cultures have grown MRSE, from 3/27 to 4/8/21.    - WBC tagged scan recommended by [facility] cardiology to see if subcutaneous-ICD also involved. This was done on 4/9: \"Whole-body nuclear WBC scan shows no abscess or ICD associated infection. \" On my exam on 4/10, patient is reporting some pain at the area and reports tenderness there as well for 2-3 days. No visible inflammation on exam. Decision to remove the subcutaneous-ICD is deferred to the cardiology teams. As such any prosthetic, metal device is at risk for seeding, hence I would consider low-threshold for this device removal as well if feasible. - Once ICD is removed, obtain blood cultures. Start of date of IV vancomycin will begin from date of first negative blood cultures taken AFTER the ICD removal. Duration will be atleast 6 weeks of IV vancomycin goal trough 15-20. Please note that patient should be be receiving any less than 6 weeks of vancomycin. If the subcutaneous-ICD is also removed, reimplantation must not be done before the blood cultures after the ICD-removal are negative for atleast 72 hours. - Patient can continue to follow [facility] ID or [facility] ID after discharge from [facility]. Can call [phone] for appointment. Thank you for the opportunity to participate in the care of this patient. Please contact with questions or concerns.       Eddie Rowe MD  Infectious Diseases

## 2021-04-10 NOTE — PROGRESS NOTES
Report called to receiving nurses Chrissy/Samara at [facility]. Opportunity for questions and answers provided. Made them aware transport is scheduled for 1700. Asked if they needed any paperwork or records faxed and they stated a copy of discharge paperwork and emtala sent with patient was enough.

## 2021-04-10 NOTE — PROGRESS NOTES
End of Shift Note    Bedside shift change report given to Néstor (oncoming nurse) by Yefri Rascon (offgoing nurse). Report included the following information SBAR, Kardex, Procedure Summary and MAR    Shift worked:  7a-7p     Shift summary and any significant changes:    Patient rested quietly in recliner for most of shift. Medicated for pain, see MAR. Emtala form in chart, still needs final set of vitals and signature from nursing supervisor and transport. [facility] scheduled for 2030. Concerns for physician to address:    Zone phone for oncoming shift:  [phone]       Activity:  Activity Level: Up ad fouzia  Number times ambulated in hallways past shift: 0  Number of times OOB to chair past shift: 3    Cardiac:   Cardiac Monitoring: Yes      Cardiac Rhythm: Normal sinus rhythm    Access:   Current line(s): PIV     Genitourinary:   Urinary status: oliguric    Respiratory:   O2 Device: None (Room air)  Chronic home O2 use?: NO  Incentive spirometer at bedside: N/A     GI:  Last Bowel Movement Date: 04/06/21  Current diet:  DIET RENAL Regular; Double Portions  Passing flatus: YES  Tolerating current diet: YES  % Diet Eaten: 100 %    Pain Management:   Patient states pain is manageable on current regimen: YES    Skin:  Yong Score: 22  Interventions: float heels, increase time out of bed and limit briefs    Patient Safety:  Fall Score:  Total Score: 1  Interventions: assistive device (walker, cane, etc), gripper socks, pt to call before getting OOB and stay with me (per policy)  High Fall Risk: Yes    Length of Stay:  Expected LOS: 4d 19h  Actual LOS: [address]

## 2021-04-10 NOTE — PROGRESS NOTES
End of Shift Note    Bedside shift change report given to [facility] (oncoming nurse) by Duane Guido (offgoing nurse). Report included the following information SBAR, Kardex, Intake/Output, MAR and Recent Results    Shift worked:  7p-7a     Shift summary and any significant changes:     Pt upset but understanding that he was not going to go to [facility] this shift. Pt c/o pain x2 and received percocet x2. Uneventful night, Pt looking forward to transfer. [facility] had called overnight, stating had to give up bed but Pt is still accepted to [facility]. Per [facility], please call after EMTALA, transport, and discharge has been completed/arranged and a bed will be allocated for him. Unable to obtain am labs, PICC team unavailable. [facility]. Concerns for physician to address:  Transfer/ Discharge to [address] [place] [address] phone for oncoming shift:   [phone]       Activity:  Activity Level: Up ad fouzia  Number times ambulated in hallways past shift: 0  Number of times OOB to chair past shift: 3    Cardiac:   Cardiac Monitoring: Yes      Cardiac Rhythm: Normal sinus rhythm    Access:   Current line(s): PIV     Genitourinary:   Urinary status: anuric    Respiratory:   O2 Device: None (Room air)  Chronic home O2 use?: NO  Incentive spirometer at bedside: YES     GI:  Last Bowel Movement Date: 04/06/21  Current diet:  DIET RENAL Regular; Double Portions  Passing flatus: YES  Tolerating current diet: YES  % Diet Eaten: 100 %    Pain Management:   Patient states pain is manageable on current regimen: YES    Skin:  Yong Score: 22  Interventions: increase time out of bed    Patient Safety:  Fall Score:  Total Score: 1  Interventions: gripper socks  High Fall Risk: Yes    Length of Stay:  Expected LOS: 4d 19h  Actual LOS: [facility]

## 2021-04-10 NOTE — PROGRESS NOTES
Call placed to access center regarding pt discharge orders and EMTALA form. Stated they will call [facility] regarding placement.

## 2021-04-10 NOTE — PROGRESS NOTES
Transition of Care Plan:     RUR:  31%  Disposition: [facility] Transfer (awaiting bed availability)  Follow up appointments:  TBD by [facility]  DME needed:  TBD by [facility]  Transportation at [place] [address] transport for EMTALA  [place] or means to access home:  Both patient and sister Eliz Hernandez [phone] has keys to the apt,  IM Medicare letter:   2nd IM Letter will be needed prior to discharge  Caregiver Contact:  SisterEliz [phone]  Discharge Caregiver contacted prior to discharge? Yes    10:41AM -  assistance requested by nursing for EMTALA transfer. Per chart review, transfer was initiated already this week awaiting bed availability. CM called [address] ([phone]) who reported that they called last night and had a bed available, however there was not a discharge order at the time thus they could not transfer and could not hold the bed overnight. CM confirmed that D/c order was placed this morning and summary written at 10:00AM. [facility] when another bed is available. Still pending at this time.      Accepting MD:  Fernando Johnson 29 Manager  [phone]

## 2021-04-14 LAB
BACTERIA SPEC CULT: ABNORMAL
SERVICE CMNT-IMP: ABNORMAL

## 2021-04-17 LAB
BACTERIA SPEC CULT: NORMAL
BACTERIA SPEC CULT: NORMAL
SERVICE CMNT-IMP: NORMAL

## 2021-05-25 ENCOUNTER — TELEPHONE (OUTPATIENT)
Dept: FAMILY MEDICINE CLINIC | Age: 46
End: 2021-05-25

## 2021-05-25 NOTE — TELEPHONE ENCOUNTER
I called the patient to follow up. Patient had his endovascular ICD removed at [address] [place] [address] 5/22/21. He said he has been feeling very well since. All prior symptoms he came with have resolved. Left chest ICD (non-endovascular) still in place. Patient said he finished his last dose of vancomycin with HD on 5/24/21 (dates decided by [address] [place] [address]), hence he has received ~4.5 weeks of vancomycin from ICD removal, which is considered adequate. I have given patient number that he can us if any issues arise, [phone]. Patient voiced agreement.        Rossy Piedra MD  Infectious Diseases

## 2021-07-19 NOTE — ED NOTES
Pt. Back from vascular at this time. Pt. Placed back in position of comfort with call bell in reach. [FreeTextEntry1] : 7/19/2021: PATRICIA ZALDIVAR is a 25 year year old female presenting for a follow-up evaluation of right foot pain. The patient’s states that her pain has not improved.		\par \par 6/28/2021: PATRICIA ZALDIVAR is a 25 year old female who presents for initial evaluation of right foot pain for 4 years, worse over the past 4 months. 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 She used to run a lot for exercise. She states there is pain when walking.

## 2021-11-05 ENCOUNTER — APPOINTMENT (OUTPATIENT)
Dept: GENERAL RADIOLOGY | Age: 46
DRG: 853 | End: 2021-11-05
Attending: EMERGENCY MEDICINE
Payer: MEDICARE

## 2021-11-05 ENCOUNTER — APPOINTMENT (OUTPATIENT)
Dept: CT IMAGING | Age: 46
DRG: 853 | End: 2021-11-05
Attending: EMERGENCY MEDICINE
Payer: MEDICARE

## 2021-11-05 ENCOUNTER — HOSPITAL ENCOUNTER (INPATIENT)
Age: 46
LOS: 13 days | Discharge: HOME | DRG: 853 | End: 2021-11-18
Attending: EMERGENCY MEDICINE | Admitting: STUDENT IN AN ORGANIZED HEALTH CARE EDUCATION/TRAINING PROGRAM
Payer: MEDICARE

## 2021-11-05 DIAGNOSIS — R78.81 STREPTOCOCCAL BACTEREMIA: ICD-10-CM

## 2021-11-05 DIAGNOSIS — A40.1: ICD-10-CM

## 2021-11-05 DIAGNOSIS — R65.20: ICD-10-CM

## 2021-11-05 DIAGNOSIS — B95.7 COAGULASE NEGATIVE STAPHYLOCOCCUS BACTEREMIA: ICD-10-CM

## 2021-11-05 DIAGNOSIS — N18.6 ESRD (END STAGE RENAL DISEASE) ON DIALYSIS (HCC): ICD-10-CM

## 2021-11-05 DIAGNOSIS — R78.81 COAGULASE NEGATIVE STAPHYLOCOCCUS BACTEREMIA: ICD-10-CM

## 2021-11-05 DIAGNOSIS — K04.7 ABSCESS OF APEX OF DENTAL ROOT COMPLICATING CHRONIC INFLAMMATION: ICD-10-CM

## 2021-11-05 DIAGNOSIS — B95.5 STREPTOCOCCAL BACTEREMIA: ICD-10-CM

## 2021-11-05 DIAGNOSIS — R11.2 INTRACTABLE NAUSEA AND VOMITING: Primary | ICD-10-CM

## 2021-11-05 DIAGNOSIS — R78.81 RECURRENT BACTEREMIA: ICD-10-CM

## 2021-11-05 DIAGNOSIS — M79.89 LEFT UPPER EXTREMITY SWELLING: ICD-10-CM

## 2021-11-05 DIAGNOSIS — R50.9 FEVER, UNSPECIFIED FEVER CAUSE: ICD-10-CM

## 2021-11-05 DIAGNOSIS — Z99.2 ESRD (END STAGE RENAL DISEASE) ON DIALYSIS (HCC): ICD-10-CM

## 2021-11-05 LAB
ALBUMIN SERPL-MCNC: 2.9 G/DL (ref 3.5–5)
ALBUMIN/GLOB SERPL: 0.6 {RATIO} (ref 1.1–2.2)
ALP SERPL-CCNC: 143 U/L (ref 45–117)
ALT SERPL-CCNC: 8 U/L (ref 12–78)
ANION GAP SERPL CALC-SCNC: 17 MMOL/L (ref 5–15)
APTT PPP: 47 SEC (ref 22.1–31)
AST SERPL-CCNC: 8 U/L (ref 15–37)
BASE DEFICIT BLD-SCNC: 10.3 MMOL/L
BILIRUB SERPL-MCNC: 0.5 MG/DL (ref 0.2–1)
BUN SERPL-MCNC: 88 MG/DL (ref 6–20)
BUN/CREAT SERPL: 6 (ref 12–20)
CA-I BLD-MCNC: 0.79 MMOL/L (ref 1.12–1.32)
CALCIUM SERPL-MCNC: 7.7 MG/DL (ref 8.5–10.1)
CHLORIDE BLD-SCNC: 107 MMOL/L (ref 100–108)
CHLORIDE SERPL-SCNC: 92 MMOL/L (ref 97–108)
CO2 BLD-SCNC: 16 MMOL/L (ref 19–24)
CO2 SERPL-SCNC: 19 MMOL/L (ref 21–32)
COMMENT, HOLDF: NORMAL
CREAT SERPL-MCNC: 15.6 MG/DL (ref 0.7–1.3)
CREAT UR-MCNC: 11.6 MG/DL (ref 0.6–1.3)
ERYTHROCYTE [DISTWIDTH] IN BLOOD BY AUTOMATED COUNT: 16.2 % (ref 11.5–14.5)
GLOBULIN SER CALC-MCNC: 5.2 G/DL (ref 2–4)
GLUCOSE BLD STRIP.AUTO-MCNC: 61 MG/DL (ref 74–106)
GLUCOSE BLD STRIP.AUTO-MCNC: 75 MG/DL (ref 65–117)
GLUCOSE SERPL-MCNC: 91 MG/DL (ref 65–100)
HCO3 BLDA-SCNC: 15 MMOL/L
HCT VFR BLD AUTO: 23.3 % (ref 36.6–50.3)
HGB BLD-MCNC: 8 G/DL (ref 12.1–17)
INR PPP: 2.7 (ref 0.9–1.1)
LACTATE BLD-SCNC: 1.25 MMOL/L (ref 0.4–2)
LIPASE SERPL-CCNC: 91 U/L (ref 73–393)
MCH RBC QN AUTO: 31.7 PG (ref 26–34)
MCHC RBC AUTO-ENTMCNC: 34.3 G/DL (ref 30–36.5)
MCV RBC AUTO: 92.5 FL (ref 80–99)
NRBC # BLD: 0 K/UL (ref 0–0.01)
NRBC BLD-RTO: 0 PER 100 WBC
PCO2 BLDV: 29.1 MMHG (ref 41–51)
PH BLDV: 7.32 [PH] (ref 7.32–7.42)
PLATELET # BLD AUTO: 216 K/UL (ref 150–400)
PMV BLD AUTO: 10.8 FL (ref 8.9–12.9)
PO2 BLDV: 22 MMHG (ref 25–40)
POTASSIUM BLD-SCNC: 4.7 MMOL/L (ref 3.5–5.5)
POTASSIUM SERPL-SCNC: 5 MMOL/L (ref 3.5–5.1)
PROT SERPL-MCNC: 8.1 G/DL (ref 6.4–8.2)
PROTHROMBIN TIME: 26.6 SEC (ref 9–11.1)
RBC # BLD AUTO: 2.52 M/UL (ref 4.1–5.7)
SAMPLES BEING HELD,HOLD: NORMAL
SERVICE CMNT-IMP: ABNORMAL
SERVICE CMNT-IMP: NORMAL
SODIUM BLD-SCNC: 134 MMOL/L (ref 136–145)
SODIUM SERPL-SCNC: 128 MMOL/L (ref 136–145)
SPECIMEN SITE: ABNORMAL
THERAPEUTIC RANGE,PTTT: ABNORMAL SECS (ref 58–77)
WBC # BLD AUTO: 12.3 K/UL (ref 4.1–11.1)

## 2021-11-05 PROCEDURE — 83690 ASSAY OF LIPASE: CPT

## 2021-11-05 PROCEDURE — 85027 COMPLETE CBC AUTOMATED: CPT

## 2021-11-05 PROCEDURE — 96376 TX/PRO/DX INJ SAME DRUG ADON: CPT

## 2021-11-05 PROCEDURE — 85730 THROMBOPLASTIN TIME PARTIAL: CPT

## 2021-11-05 PROCEDURE — 87040 BLOOD CULTURE FOR BACTERIA: CPT

## 2021-11-05 PROCEDURE — 80053 COMPREHEN METABOLIC PANEL: CPT

## 2021-11-05 PROCEDURE — 65270000029 HC RM PRIVATE

## 2021-11-05 PROCEDURE — 96374 THER/PROPH/DIAG INJ IV PUSH: CPT

## 2021-11-05 PROCEDURE — 87077 CULTURE AEROBIC IDENTIFY: CPT

## 2021-11-05 PROCEDURE — 74176 CT ABD & PELVIS W/O CONTRAST: CPT

## 2021-11-05 PROCEDURE — 96375 TX/PRO/DX INJ NEW DRUG ADDON: CPT

## 2021-11-05 PROCEDURE — 85610 PROTHROMBIN TIME: CPT

## 2021-11-05 PROCEDURE — 99285 EMERGENCY DEPT VISIT HI MDM: CPT

## 2021-11-05 PROCEDURE — 84295 ASSAY OF SERUM SODIUM: CPT

## 2021-11-05 PROCEDURE — 84145 PROCALCITONIN (PCT): CPT

## 2021-11-05 PROCEDURE — 74011250636 HC RX REV CODE- 250/636: Performed by: EMERGENCY MEDICINE

## 2021-11-05 PROCEDURE — 87186 SC STD MICRODIL/AGAR DIL: CPT

## 2021-11-05 PROCEDURE — 36415 COLL VENOUS BLD VENIPUNCTURE: CPT

## 2021-11-05 PROCEDURE — 82962 GLUCOSE BLOOD TEST: CPT

## 2021-11-05 PROCEDURE — 71045 X-RAY EXAM CHEST 1 VIEW: CPT

## 2021-11-05 RX ORDER — VANCOMYCIN 1.75 GRAM/500 ML IN 0.9 % SODIUM CHLORIDE INTRAVENOUS
1750
Status: COMPLETED | OUTPATIENT
Start: 2021-11-05 | End: 2021-11-06

## 2021-11-05 RX ORDER — METOPROLOL SUCCINATE 25 MG/1
12.5 TABLET, EXTENDED RELEASE ORAL DAILY
Status: DISCONTINUED | OUTPATIENT
Start: 2021-11-06 | End: 2021-11-18 | Stop reason: HOSPADM

## 2021-11-05 RX ORDER — MORPHINE SULFATE 2 MG/ML
2 INJECTION, SOLUTION INTRAMUSCULAR; INTRAVENOUS
Status: COMPLETED | OUTPATIENT
Start: 2021-11-05 | End: 2021-11-05

## 2021-11-05 RX ORDER — PANTOPRAZOLE SODIUM 40 MG/1
40 TABLET, DELAYED RELEASE ORAL
Status: DISCONTINUED | OUTPATIENT
Start: 2021-11-06 | End: 2021-11-18 | Stop reason: HOSPADM

## 2021-11-05 RX ORDER — HYDROMORPHONE HYDROCHLORIDE 1 MG/ML
0.5 INJECTION, SOLUTION INTRAMUSCULAR; INTRAVENOUS; SUBCUTANEOUS
Status: COMPLETED | OUTPATIENT
Start: 2021-11-05 | End: 2021-11-05

## 2021-11-05 RX ORDER — SODIUM CHLORIDE 0.9 % (FLUSH) 0.9 %
5-40 SYRINGE (ML) INJECTION EVERY 8 HOURS
Status: DISCONTINUED | OUTPATIENT
Start: 2021-11-05 | End: 2021-11-18 | Stop reason: HOSPADM

## 2021-11-05 RX ORDER — CALCITRIOL 0.25 UG/1
0.5 CAPSULE ORAL DAILY
Status: DISCONTINUED | OUTPATIENT
Start: 2021-11-06 | End: 2021-11-18 | Stop reason: HOSPADM

## 2021-11-05 RX ORDER — ACETAMINOPHEN 650 MG/1
650 SUPPOSITORY RECTAL
Status: DISCONTINUED | OUTPATIENT
Start: 2021-11-05 | End: 2021-11-18 | Stop reason: HOSPADM

## 2021-11-05 RX ORDER — ATORVASTATIN CALCIUM 20 MG/1
20 TABLET, FILM COATED ORAL DAILY
Status: DISCONTINUED | OUTPATIENT
Start: 2021-11-06 | End: 2021-11-18 | Stop reason: HOSPADM

## 2021-11-05 RX ORDER — ONDANSETRON 4 MG/1
4 TABLET, ORALLY DISINTEGRATING ORAL
Status: DISCONTINUED | OUTPATIENT
Start: 2021-11-05 | End: 2021-11-18 | Stop reason: HOSPADM

## 2021-11-05 RX ORDER — ONDANSETRON 2 MG/ML
4 INJECTION INTRAMUSCULAR; INTRAVENOUS
Status: COMPLETED | OUTPATIENT
Start: 2021-11-05 | End: 2021-11-05

## 2021-11-05 RX ORDER — SODIUM CHLORIDE 0.9 % (FLUSH) 0.9 %
5-40 SYRINGE (ML) INJECTION AS NEEDED
Status: DISCONTINUED | OUTPATIENT
Start: 2021-11-05 | End: 2021-11-18 | Stop reason: HOSPADM

## 2021-11-05 RX ORDER — POLYETHYLENE GLYCOL 3350 17 G/17G
17 POWDER, FOR SOLUTION ORAL DAILY PRN
Status: DISCONTINUED | OUTPATIENT
Start: 2021-11-05 | End: 2021-11-18 | Stop reason: HOSPADM

## 2021-11-05 RX ORDER — MORPHINE SULFATE 2 MG/ML
4 INJECTION, SOLUTION INTRAMUSCULAR; INTRAVENOUS
Status: COMPLETED | OUTPATIENT
Start: 2021-11-05 | End: 2021-11-05

## 2021-11-05 RX ORDER — GUAIFENESIN 100 MG/5ML
81 LIQUID (ML) ORAL DAILY
Status: DISCONTINUED | OUTPATIENT
Start: 2021-11-06 | End: 2021-11-18 | Stop reason: HOSPADM

## 2021-11-05 RX ORDER — ONDANSETRON 2 MG/ML
4 INJECTION INTRAMUSCULAR; INTRAVENOUS
Status: DISCONTINUED | OUTPATIENT
Start: 2021-11-05 | End: 2021-11-18 | Stop reason: HOSPADM

## 2021-11-05 RX ORDER — ACETAMINOPHEN 325 MG/1
650 TABLET ORAL
Status: DISCONTINUED | OUTPATIENT
Start: 2021-11-05 | End: 2021-11-18 | Stop reason: HOSPADM

## 2021-11-05 RX ORDER — CALCIUM ACETATE 667 MG/1
2 CAPSULE ORAL
Status: DISCONTINUED | OUTPATIENT
Start: 2021-11-06 | End: 2021-11-18 | Stop reason: HOSPADM

## 2021-11-05 RX ADMIN — MORPHINE SULFATE 2 MG: 2 INJECTION, SOLUTION INTRAMUSCULAR; INTRAVENOUS at 18:59

## 2021-11-05 RX ADMIN — MORPHINE SULFATE 4 MG: 2 INJECTION, SOLUTION INTRAMUSCULAR; INTRAVENOUS at 20:45

## 2021-11-05 RX ADMIN — HYDROMORPHONE HYDROCHLORIDE 0.5 MG: 1 INJECTION, SOLUTION INTRAMUSCULAR; INTRAVENOUS; SUBCUTANEOUS at 23:28

## 2021-11-05 RX ADMIN — ONDANSETRON 4 MG: 2 INJECTION INTRAMUSCULAR; INTRAVENOUS at 18:58

## 2021-11-05 NOTE — Clinical Note
History and physical documented and up to date, allergies reviewed, lab results reviewed, pre-procedure education provided, patient verbalized understanding of procedure, procedural consent signed and patient is NPO.
ID band present and verified. Family is not present.
Respiratory

## 2021-11-06 LAB
ANION GAP SERPL CALC-SCNC: 17 MMOL/L (ref 5–15)
BUN SERPL-MCNC: 91 MG/DL (ref 6–20)
BUN/CREAT SERPL: 6 (ref 12–20)
CALCIUM SERPL-MCNC: 8.3 MG/DL (ref 8.5–10.1)
CHLORIDE SERPL-SCNC: 92 MMOL/L (ref 97–108)
CO2 SERPL-SCNC: 19 MMOL/L (ref 21–32)
COMMENT, HOLDF: NORMAL
CREAT SERPL-MCNC: 16 MG/DL (ref 0.7–1.3)
GLUCOSE SERPL-MCNC: 91 MG/DL (ref 65–100)
HBV SURFACE AB SER QL: REACTIVE
HBV SURFACE AB SER-ACNC: 854.45 MIU/ML
HBV SURFACE AG SER QL: 0.4 INDEX
HBV SURFACE AG SER QL: NEGATIVE
INR PPP: 2.5 (ref 0.9–1.1)
MAGNESIUM SERPL-MCNC: 2.4 MG/DL (ref 1.6–2.4)
POTASSIUM SERPL-SCNC: 5.3 MMOL/L (ref 3.5–5.1)
PROCALCITONIN SERPL-MCNC: 3.37 NG/ML
PROCALCITONIN SERPL-MCNC: 3.38 NG/ML
PROTHROMBIN TIME: 25.2 SEC (ref 9–11.1)
SAMPLES BEING HELD,HOLD: NORMAL
SODIUM SERPL-SCNC: 128 MMOL/L (ref 136–145)

## 2021-11-06 PROCEDURE — 74011000258 HC RX REV CODE- 258: Performed by: STUDENT IN AN ORGANIZED HEALTH CARE EDUCATION/TRAINING PROGRAM

## 2021-11-06 PROCEDURE — 80048 BASIC METABOLIC PNL TOTAL CA: CPT

## 2021-11-06 PROCEDURE — 86706 HEP B SURFACE ANTIBODY: CPT

## 2021-11-06 PROCEDURE — 74011250636 HC RX REV CODE- 250/636

## 2021-11-06 PROCEDURE — 5A1D70Z PERFORMANCE OF URINARY FILTRATION, INTERMITTENT, LESS THAN 6 HOURS PER DAY: ICD-10-PCS | Performed by: INTERNAL MEDICINE

## 2021-11-06 PROCEDURE — 90935 HEMODIALYSIS ONE EVALUATION: CPT

## 2021-11-06 PROCEDURE — 74011250636 HC RX REV CODE- 250/636: Performed by: STUDENT IN AN ORGANIZED HEALTH CARE EDUCATION/TRAINING PROGRAM

## 2021-11-06 PROCEDURE — 84145 PROCALCITONIN (PCT): CPT

## 2021-11-06 PROCEDURE — 74011250637 HC RX REV CODE- 250/637: Performed by: STUDENT IN AN ORGANIZED HEALTH CARE EDUCATION/TRAINING PROGRAM

## 2021-11-06 PROCEDURE — 65270000029 HC RM PRIVATE

## 2021-11-06 PROCEDURE — 83735 ASSAY OF MAGNESIUM: CPT

## 2021-11-06 PROCEDURE — 36415 COLL VENOUS BLD VENIPUNCTURE: CPT

## 2021-11-06 PROCEDURE — 74011250636 HC RX REV CODE- 250/636: Performed by: NURSE PRACTITIONER

## 2021-11-06 PROCEDURE — 87340 HEPATITIS B SURFACE AG IA: CPT

## 2021-11-06 PROCEDURE — 85610 PROTHROMBIN TIME: CPT

## 2021-11-06 RX ORDER — HYDROMORPHONE HYDROCHLORIDE 1 MG/ML
0.5 INJECTION, SOLUTION INTRAMUSCULAR; INTRAVENOUS; SUBCUTANEOUS
Status: DISCONTINUED | OUTPATIENT
Start: 2021-11-06 | End: 2021-11-07

## 2021-11-06 RX ORDER — WARFARIN 1 MG/1
3 TABLET ORAL ONCE
Status: COMPLETED | OUTPATIENT
Start: 2021-11-06 | End: 2021-11-06

## 2021-11-06 RX ORDER — HYDROMORPHONE HYDROCHLORIDE 1 MG/ML
INJECTION, SOLUTION INTRAMUSCULAR; INTRAVENOUS; SUBCUTANEOUS
Status: DISPENSED
Start: 2021-11-06 | End: 2021-11-06

## 2021-11-06 RX ADMIN — PIPERACILLIN AND TAZOBACTAM 3.38 G: 3; .375 INJECTION, POWDER, LYOPHILIZED, FOR SOLUTION INTRAVENOUS at 04:35

## 2021-11-06 RX ADMIN — HYDROMORPHONE HYDROCHLORIDE 0.5 MG: 1 INJECTION, SOLUTION INTRAMUSCULAR; INTRAVENOUS; SUBCUTANEOUS at 16:40

## 2021-11-06 RX ADMIN — VANCOMYCIN HYDROCHLORIDE 750 MG: 750 INJECTION, POWDER, LYOPHILIZED, FOR SOLUTION INTRAVENOUS at 20:54

## 2021-11-06 RX ADMIN — HYDROMORPHONE HYDROCHLORIDE 0.5 MG: 1 INJECTION, SOLUTION INTRAMUSCULAR; INTRAVENOUS; SUBCUTANEOUS at 01:43

## 2021-11-06 RX ADMIN — HYDROMORPHONE HYDROCHLORIDE 0.5 MG: 1 INJECTION, SOLUTION INTRAMUSCULAR; INTRAVENOUS; SUBCUTANEOUS at 20:54

## 2021-11-06 RX ADMIN — Medication 10 ML: at 21:02

## 2021-11-06 RX ADMIN — ATORVASTATIN CALCIUM 20 MG: 20 TABLET, FILM COATED ORAL at 08:41

## 2021-11-06 RX ADMIN — PANTOPRAZOLE SODIUM 40 MG: 40 TABLET, DELAYED RELEASE ORAL at 07:42

## 2021-11-06 RX ADMIN — CALCIUM ACETATE 1334 MG: 667 CAPSULE ORAL at 16:40

## 2021-11-06 RX ADMIN — ACETAMINOPHEN 650 MG: 325 TABLET ORAL at 20:53

## 2021-11-06 RX ADMIN — WARFARIN SODIUM 3 MG: 1 TABLET ORAL at 17:01

## 2021-11-06 RX ADMIN — Medication 10 ML: at 16:36

## 2021-11-06 RX ADMIN — Medication 10 ML: at 01:16

## 2021-11-06 RX ADMIN — CALCIUM ACETATE 1334 MG: 667 CAPSULE ORAL at 11:00

## 2021-11-06 RX ADMIN — Medication 10 ML: at 20:54

## 2021-11-06 RX ADMIN — CALCITRIOL CAPSULES 0.25 MCG 0.5 MCG: 0.25 CAPSULE ORAL at 08:41

## 2021-11-06 RX ADMIN — HYDROMORPHONE HYDROCHLORIDE 0.5 MG: 1 INJECTION, SOLUTION INTRAMUSCULAR; INTRAVENOUS; SUBCUTANEOUS at 11:00

## 2021-11-06 RX ADMIN — PIPERACILLIN AND TAZOBACTAM 3.38 G: 3; .375 INJECTION, POWDER, LYOPHILIZED, FOR SOLUTION INTRAVENOUS at 16:36

## 2021-11-06 RX ADMIN — CALCIUM ACETATE 1334 MG: 667 CAPSULE ORAL at 07:42

## 2021-11-06 RX ADMIN — PIPERACILLIN AND TAZOBACTAM 3.38 G: 3; .375 INJECTION, POWDER, LYOPHILIZED, FOR SOLUTION INTRAVENOUS at 00:36

## 2021-11-06 RX ADMIN — VANCOMYCIN HYDROCHLORIDE 1750 MG: 10 INJECTION, POWDER, LYOPHILIZED, FOR SOLUTION INTRAVENOUS at 01:13

## 2021-11-06 RX ADMIN — HYDROMORPHONE HYDROCHLORIDE 0.5 MG: 1 INJECTION, SOLUTION INTRAMUSCULAR; INTRAVENOUS; SUBCUTANEOUS at 06:50

## 2021-11-06 RX ADMIN — ASPIRIN 81 MG: 81 TABLET, CHEWABLE ORAL at 08:41

## 2021-11-06 NOTE — PROCEDURES
Hemodialysis / [phone]    Vitals Pre Post Assessment Pre Post   BP BP: 133/72 (11/06/21 1300) 149/80 LOC A & o x 4 No change   HR Pulse (Heart Rate): 82 (11/06/21 1300) 94 Lungs Clear No change   Resp Resp Rate: 16 (11/06/21 1300) 18 Cardiac S1 S2 No change   Temp Temp: 98.6 °F (37 °C) (11/06/21 1215) 98.6 oral Skin Warm dry & intact No change   Weight n/a n/a Edema +3 facial + 3 lower ext No change   Tele status yes yes Pain Pain Intensity 1: 5 (11/06/21 1054) No change     Orders   Duration: Start: 7756 End: 1545 Total: 3.5   Dialyzer: Dialyzer/Set Up Inspection: Revaclear (11/06/21 1215)   K Bath: Dialysate K (mEq/L): 2 (11/06/21 1215)   Ca Bath: Dialysate CA (mEq/L): 2.5 (11/06/21 1215)   Na: Dialysate NA (mEq/L): 138 (11/06/21 1215)   Bicarb: Dialysate HCO3 (mEq/L): 38 (11/06/21 1215)   Target Fluid Removal: Goal/Amount of Fluid to Remove (mL): 2500 mL (11/06/21 1215)     Access AVG   Type & Location: Left upper arm AVG   Comments:   + thrill/bruit.  Cannulated x 2 with 15g needles without incident                                      Labs   HBsAg (Antigen) / date:   Pending                                     HBsAb (Antibody) / date: pending   Source:    Obtained/Reviewed  Critical Results Called HGB   Date Value Ref Range Status   11/05/2021 8.0 (L) 12.1 - 17.0 g/dL Final     Potassium   Date Value Ref Range Status   11/06/2021 5.3 (H) 3.5 - 5.1 mmol/L Final     Calcium   Date Value Ref Range Status   11/06/2021 8.3 (L) 8.5 - 10.1 MG/DL Final     BUN   Date Value Ref Range Status   11/06/2021 91 (H) 6 - 20 MG/DL Final     Creatinine   Date Value Ref Range Status   11/06/2021 16.00 (H) 0.70 - 1.30 MG/DL Final        Meds Given   Name Dose Route                    Adequacy / Fluid    Total Liters Process: 89.5   Net Fluid Removed: 2500ml      Comments   Time Out Done:   (Time) 1100   Admitting Diagnosis: ESRD   Consent obtained/signed: Informed Consent Verified: Yes (11/06/21 1215)   Machine / RO # Machine Number: [id number] (11/06/21 1215)   Primary Nurse Rpt Pre: Nell Velez   Primary Nurse Rpt Post: Nell Velez   Pt Education: Access care   Care Plan: Continue HD   Pts outpatient [facility]      Tx Summary SUZANNE AVF: skin CDI. No s/s of infection. + B/T. No issues with cannulation or hemostasis. Running well at . Hep labs drawn. Pt arrived to HD [address]. Consent signed & on file. SBAR received from Primary RN. 1215: Pt cannulated with 75K needles per policy & without issue. Labs drawn per request/ order. VSS. Dialysis Tx initiated. 1245: pt. Stable, lines secure and visible  1315: Pt. Stable, lines secure and visible  1345: Pt resting quietly   1415: pt. Stable, resting well  1445: pt. Stable, lines secure  1515: pt. Stable, lines secure. 1545: Tx ended. VSS. All possible blood returned to patient. Hemostasis achieved without issue. Bed locked and in the lowest position, call bell and belongings in reach. SBAR given to Primary, RN. Patient is stable at time of their/ my departure. All Dialysis related medications have been reviewed.     Comments:

## 2021-11-06 NOTE — CONSULTS
..                              [address]  YOB: 1975          Assessment & Plan:   IMP:  ESRD ON HHD  ANEMIA  HYPERKALEMIA  VOLUME OVERLOAD/SOB  SEPSIS? N/V    PLAN:  HD TODAY  GO FOR 2.5L OFF  LIKELY WILL NEED UF MOO AS WELL  LOWER EDW   SEPSIS WORKUP       Subjective:   CC: ESRD ON HHD  HPI:  SAYS EDW  IS 66 KG AND HE WAS 70 YEST. DOES HHD. ROS: + SOB NO CP NO F/C/NS  Current Facility-Administered Medications   Medication Dose Route Frequency    WARFARIN INFORMATION NOTE (COUMADIN)   Other QPM    HYDROmorphone (DILAUDID) injection 0.5 mg  0.5 mg IntraVENous Q4H PRN    aspirin chewable tablet 81 mg  81 mg Oral DAILY    calcitRIOL (ROCALTROL) capsule 0.5 mcg  0.5 mcg Oral DAILY    atorvastatin (LIPITOR) tablet 20 mg  20 mg Oral DAILY    calcium acetate(phosphat bind) (PHOSLO) capsule 1,334 mg  2 Capsule Oral TID WITH MEALS    metoprolol succinate (TOPROL-XL) XL tablet 12.5 mg  12.5 mg Oral DAILY    pantoprazole (PROTONIX) tablet 40 mg  40 mg Oral ACB    piperacillin-tazobactam (ZOSYN) 3.375 g in 0.9% sodium chloride (MBP/ADV) 100 mL MBP  3.375 g IntraVENous Q12H    VANCOMYCIN INFORMATION NOTE   Other Rx Dosing/Monitoring    sodium chloride (NS) flush 5-40 mL  5-40 mL IntraVENous Q8H    sodium chloride (NS) flush 5-40 mL  5-40 mL IntraVENous PRN    acetaminophen (TYLENOL) tablet 650 mg  650 mg Oral Q6H PRN    Or    acetaminophen (TYLENOL) suppository 650 mg  650 mg Rectal Q6H PRN    polyethylene glycol (MIRALAX) packet 17 g  17 g Oral DAILY PRN    ondansetron (ZOFRAN ODT) tablet 4 mg  4 mg Oral Q8H PRN    Or    ondansetron (ZOFRAN) injection 4 mg  4 mg IntraVENous Q6H PRN          Objective:     Vitals:  Blood pressure (!) 142/84, pulse 94, temperature 99.9 °F (37.7 °C), resp. rate (!) 32, height 5' 7\" (1.702 m), weight 66 kg (145 lb 8.1 oz), SpO2 100 %.   Temp (24hrs), Av.9 °F (37.7 °C), Min:99.9 °F (37.7 °C), Max:99.9 °F (37.7 °C)      Intake and Output:  No intake/output data recorded. No intake/output data recorded. Physical Exam:                Patient is intubated:  NO    Physical Examination:   GENERAL ASSESSMENT: NAD  HEENT:FACIAL EDEMA  CHEST: SYMMETRIC LUNG EXP  :[place]: N  EXTREMITY: EDEMA  NEURO:Grossly non focal          ECG/rhythm:    Data Review      No results for input(s): TNIPOC in the last 72 hours. No lab exists for component: ITNL   No results for input(s): CPK, CKMB, TROIQ in the last 72 hours. Recent Labs     11/06/21 0041 11/05/21  1634   * 128*   K 5.3* 5.0   CL 92* 92*   CO2 19* 19*   BUN 91* 88*   CREA 16.00* 15.60*   GLU 91 91   MG 2.4  --    CA 8.3* 7.7*   ALB  --  2.9*   WBC  --  12.3*   HGB  --  8.0*   HCT  --  23.3*   PLT  --  216      Recent Labs     11/06/21 0041 11/05/21  1634   INR 2.5* 2.7*   PTP 25.2* 26.6*   APTT  --  47.0*     Needs: urine analysis, urine sodium, protein and creatinine  Lab Results   Component Value Date/Time    Sodium,urine random 115 07/14/2011 07:20 PM    Creatinine, urine 52.2 07/14/2011 07:20 PM         Discussed with:  PT    : Alexa Burton MD  11/6/2021        [facility]:  [email or web address]  [facility] office:  [address] [place] [address], [address] [place] [address] [address]  [place], [address] [facility]  Phone: [phone]  Fax :     [phone]    [place] office:  [address] [facility], [address] [facility]  Phone - [phone]  Fax - [phone]

## 2021-11-06 NOTE — H&P
Hospitalist Admission Note    NAME: Monico Sosa   :  1975   MRN:  [id number]     Date/Time:  2021 11:27 PM    Patient PCP: Karen Pimentel MD  ______________________________________________________________________  Given the patient's current clinical presentation, I have a high level of concern for decompensation if discharged from the emergency department. Complex decision making was performed, which includes reviewing the patient's available past medical records, laboratory results, and x-ray films. My assessment of this patient's clinical condition and my plan of care is as follows. Assessment / Plan:    Nausea vomiting  Generalized body ache  Sepsis of unknown etiology  Sepsis indicatorstachycardia, tachypnea, fever. CT abdomen/pelvisno acute abnormality. Chest x-rayNo focal airspace process. Mild cardiomegaly with probable pulmonary  arterial enlargement. Has a similar presentation in 2021 when the patient has persistent bacteremia and got ICD removed. Blood culture pending. Procalcitonin is also pending. Started on vancomycin and Zosyn. Lactic acid is within normal limits. End-stage renal disease on dialysis  [facility] the dialysis today, consult nephrology. Continue with the PhosLo and calcitriol. 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 Pharmacy consulted for Coumadin dosing. Code Status: Full code  Surrogate Decision Maker:    DVT Prophylaxis: Coumadin  GI Prophylaxis: not indicated    Baseline: Ambulatory at home      Subjective:   CHIEF COMPLAINT: Nausea and vomiting, generalized weakness    HISTORY OF PRESENT ILLNESS:     Minor Bunk is a 42 y.o.  male who presents with nausea vomiting generalized weakness for 3 days.   Patient past medical history of end-stage renal disease on dialysis, hypertension, CAD, DVT, systolic CHF with cardiomyopathy, gout. Patient stated for the last 3 days he is having nausea and vomiting along with fever with body ache and generalized weakness. Patient to dialysis at home but because of the weakness skip the dialysis today. Denies any chest pain, no shortness of breath, no cough, no changes in the weight and appetite, no other complaints. Similar presentation in the past for the patient has bacteremia. We were asked to admit for work up and evaluation of the above problems. Past Medical History:   Diagnosis Date    Abdominal hematoma     AICD (automatic cardioverter/defibrillator) present     CAD (coronary artery disease)     anterior MI s/p 2 stents  2007    Chronic abdominal pain     Chronic kidney disease     ESRD; Dialysis dependent.  Home [facility] does labs- [facility] tpke    DVT (deep venous thrombosis) ([address] 2001    DVT of popliteal vein ([address] 11/2011    not anticoagulated due to bleeding, IVC filter    Endocarditis     Gallstone pancreatitis     Gastrointestinal disorder     acid reflux    Gastrointestinal disorder     peptic ulcer    Hemodialysis patient ([address]     High cholesterol     Hypertension     Kidney transplant     b/l kidneys 1995, 2001    Long term current use of anticoagulant therapy     Nephrotic syndrome     Other ill-defined conditions(799.89)     kidny transplant x2,  on dialysis    Other ill-defined conditions(799.89)     home dialysis    Other ill-defined conditions(799.89)     hx recurrent left leg DVT    Peritonitis ([address]     Seizures ([address] 2015    most recent- only had three in lifetime    Small bowel obstruction (HCC)     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    V-tach [facility])         Past Surgical History:   Procedure Laterality Date    HX APPENDECTOMY      HX CHOLECYSTECTOMY      HX OTHER SURGICAL      cholecystectomy    HX OTHER SURGICAL  11/2011    exploratory laparotomy    HX OTHER SURGICAL fem-pop    HX OTHER SURGICAL  02/2013    right upper extremity fistula    HX PACEMAKER Left 6/2009    AICD-st latonya-not connected    HX PACEMAKER Left     AICD-left side-BS-working    HX SMALL BOWEL RESECTION      HX TRANSPLANT  2001     Kidney    HX TRANSPLANT  1992    kidney    HX UROLOGICAL      2 kidney transplants    HX VASCULAR ACCESS Right     femoral access for HD- does 5 day dialysis @ home    IR REMOVE TUNL CVAD W/O PORT / PUMP  10/29/2020    IR REPLACE CVC TUNNELED W/O PORT  9/10/2020    HI CARDIAC SURG PROCEDURE UNLIST  2007    2 stents    HI EDG US EXAM SURGICAL ALTER STOM DUODENUM/JEJUNUM  7/15/2011         HI ESOPHAGOGASTRODUODENOSCOPY TRANSORAL DIAGNOSTIC  5/24/2012         VASCULAR SURGERY PROCEDURE UNLIST  11/14/2017    Insertion AV graft right upper arm (bovine); ligation of AV fistula right arm       Social History     Tobacco Use    Smoking status: Never Smoker    Smokeless tobacco: Never Used   Substance Use Topics    Alcohol use: No        Family History   Problem Relation Age of Onset    COPD Mother     Lung Disease Mother     Cancer Father         stomach    Cancer Maternal Grandmother      Allergies   Allergen Reactions    Shellfish Containing Products Swelling     Break out in rash, trouble breathing    Contrast Agent [Iodine] Shortness of Breath    Heparin Analogues Other (comments)     Positive history of HIT        Prior to Admission medications    Medication Sig Start Date End Date Taking? Authorizing Provider   ondansetron (Zofran ODT) 4 mg disintegrating tablet 1 Tab by SubLINGual route every eight (8) hours as needed for Nausea or Vomiting. 3/27/21   Steven Law MD   metoprolol succinate (TOPROL-XL) 25 mg XL tablet Take 0.5 Tabs by mouth daily. 11/10/20   Jessenia Nguyen MD   calcium acetate,phosphat bind, (PHOSLO) 667 mg cap Take 2 Caps by mouth three (3) times daily (with meals).  Indications: low amount of calcium in the blood, renal osteodystrophy with hyperphosphatemia 11/10/20   Genet Nagy MD   atorvastatin (Lipitor) 20 mg tablet Take 20 mg by mouth daily. Provider, Historical   warfarin (COUMADIN) 2.5 mg tablet Take 2.5 mg by mouth daily. Provider, Historical   acetaminophen (TYLENOL) 500 mg tablet Take 1 Tab by mouth every four (4) hours as needed for Pain. Over the counter 1/20/19   Jennie Horton MD   omeprazole (PRILOSEC) 20 mg capsule Take 20 mg by mouth daily. Provider, Historical   calcitRIOL (ROCALTROL) 0.5 mcg capsule Take 0.5 mcg by mouth daily. Provider, Historical   aspirin 81 mg chewable tablet Take 81 mg by mouth daily. Other, MD Morro       REVIEW OF SYSTEMS:     I am not able to complete the review of systems because:    The patient is intubated and sedated    The patient has altered mental status due to his acute medical problems    The patient has baseline aphasia from prior stroke(s)    The patient has baseline dementia and is not reliable historian    The patient is in acute medical distress and unable to provide information           Total of 12 systems reviewed as follows:       POSITIVE= underlined text  Negative = text not underlined  General:  fever, chills, sweats, generalized weakness, weight loss/gain,      loss of appetite   Eyes:    blurred vision, eye pain, loss of vision, double vision  ENT:    rhinorrhea, pharyngitis   Respiratory:   cough, sputum production, SOB, ZAMUDIO, wheezing, pleuritic pain   Cardiology:   chest pain, palpitations, orthopnea, PND, edema, syncope   Gastrointestinal:  abdominal pain , N/V, diarrhea, dysphagia, constipation, bleeding   Genitourinary:  frequency, urgency, dysuria, hematuria, incontinence   Muskuloskeletal :  arthralgia, myalgia, back pain  Hematology:  easy bruising, nose or gum bleeding, lymphadenopathy   Dermatological: rash, ulceration, pruritis, color change / jaundice  Endocrine:   hot flashes or polydipsia   Neurological:  headache, dizziness, confusion, focal weakness, paresthesia,     Speech difficulties, memory loss, gait difficulty  Psychological: Feelings of anxiety, depression, agitation    Objective:   VITALS:    Visit Vitals  BP (!) 142/84   Pulse 94   Temp 99.9 °F (37.7 °C)   Resp (!) 32   Ht 5' 7\" (1.702 m)   Wt 66 kg (145 lb 8.1 oz)   SpO2 100%   BMI 22.79 kg/m²       PHYSICAL EXAM:    General:    Alert, cooperative, no distress, appears stated age. HEENT: Atraumatic, anicteric sclerae, pink conjunctivae     No oral ulcers, mucosa moist, throat clear, dentition fair  Neck:  Supple, symmetrical,  thyroid: non tender  Lungs:   Clear to auscultation bilaterally. No Wheezing or Rhonchi. No rales. Chest wall:  No tenderness  No Accessory muscle use. Heart:   Regular  rhythm,  No  murmur   No edema  Abdomen:   Soft, non-tender. Not distended. Bowel sounds normal  Extremities: No cyanosis. No clubbing,      Skin turgor normal, Capillary refill normal, Radial dial pulse 2+  Skin:     Not pale. Not Jaundiced  No rashes   Psych:  Good insight. Not depressed. Not anxious or agitated. Neurologic: EOMs intact. No facial asymmetry. No aphasia or slurred speech. Symmetrical strength, Sensation grossly intact.  Alert and oriented X 4.     _______________________________________________________________________  Care Plan discussed with:    Comments   Patient x    Family      RN x    Care Manager                    Consultant:  x    _______________________________________________________________________  Expected  Disposition:   Home with Family x   HH/PT/OT/RN    SNF/LTC    KARLI    ________________________________________________________________________  TOTAL TIME:  61 Minutes    Critical Care Provided     Minutes non procedure based      Comments     Reviewed previous records   >50% of visit spent in counseling and coordination of care  Discussion with patient and/or family and questions answered ________________________________________________________________________  Signed: Fatou Alonso MD    Procedures: see electronic medical records for all procedures/Xrays and details which were not copied into this note but were reviewed prior to creation of Plan. LAB DATA REVIEWED:    Recent Results (from the past 24 hour(s))   CBC W/O DIFF    Collection Time: 11/05/21  4:34 PM   Result Value Ref Range    WBC 12.3 (H) 4.1 - 11.1 K/uL    RBC 2.52 (L) 4.10 - 5.70 M/uL    HGB 8.0 (L) 12.1 - 17.0 g/dL    HCT 23.3 (L) 36.6 - 50.3 %    MCV 92.5 80.0 - 99.0 FL    MCH 31.7 26.0 - 34.0 PG    MCHC 34.3 30.0 - 36.5 g/dL    RDW 16.2 (H) 11.5 - 14.5 %    PLATELET 786 315 - 391 K/uL    MPV 10.8 8.9 - 12.9 FL    NRBC 0.0 0  WBC    ABSOLUTE NRBC 0.00 0.00 - 7.30 K/uL   METABOLIC PANEL, COMPREHENSIVE    Collection Time: 11/05/21  4:34 PM   Result Value Ref Range    Sodium 128 (L) 136 - 145 mmol/L    Potassium 5.0 3.5 - 5.1 mmol/L    Chloride 92 (L) 97 - 108 mmol/L    CO2 19 (L) 21 - 32 mmol/L    Anion gap 17 (H) 5 - 15 mmol/L    Glucose 91 65 - 100 mg/dL    BUN 88 (H) 6 - 20 MG/DL    Creatinine 15.60 (H) 0.70 - 1.30 MG/DL    BUN/Creatinine ratio 6 (L) 12 - 20      GFR est AA 4 (L) >60 ml/min/1.73m2    GFR est non-AA 3 (L) >60 ml/min/1.73m2    Calcium 7.7 (L) 8.5 - 10.1 MG/DL    Bilirubin, total 0.5 0.2 - 1.0 MG/DL    ALT (SGPT) 8 (L) 12 - 78 U/L    AST (SGOT) 8 (L) 15 - 37 U/L    Alk.  phosphatase 143 (H) 45 - 117 U/L    Protein, total 8.1 6.4 - 8.2 g/dL    Albumin 2.9 (L) 3.5 - 5.0 g/dL    Globulin 5.2 (H) 2.0 - 4.0 g/dL    A-G Ratio 0.6 (L) 1.1 - 2.2     LIPASE    Collection Time: 11/05/21  4:34 PM   Result Value Ref Range    Lipase 91 73 - 393 U/L   SAMPLES BEING HELD    Collection Time: 11/05/21  4:34 PM   Result Value Ref Range    SAMPLES BEING HELD 1red, 1 pst, 1pedbc     COMMENT        Add-on orders for these samples will be processed based on acceptable specimen integrity and analyte stability, which may vary by analyte.    PTT    Collection Time: 11/05/21  4:34 PM   Result Value Ref Range    aPTT 47.0 (H) 22.1 - 31.0 sec    aPTT, therapeutic range     58.0 - 77.0 SECS   PROTHROMBIN TIME + INR    Collection Time: 11/05/21  4:34 PM   Result Value Ref Range    INR 2.7 (H) 0.9 - 1.1      Prothrombin time 26.6 (H) 9.0 - 11.1 sec   BLOOD GAS,CHEM8,LACTIC ACID POC    Collection Time: 11/05/21  7:02 PM   Result Value Ref Range    Calcium, ionized (POC) 0.79 (LL) 1.12 - 1.32 mmol/L    BICARBONATE 15 mmol/L    Base deficit (POC) 10.3 mmol/L    Sample source VENOUS BLOOD      CO2, POC 16 (L) 19 - 24 MMOL/L    Sodium,  (L) 136 - 145 MMOL/L    Potassium, POC 4.7 3.5 - 5.5 MMOL/L    Chloride,  100 - 108 MMOL/L    Glucose, POC 61 (L) 74 - 106 MG/DL    Creatinine, POC 11.6 (H) 0.6 - 1.3 MG/DL    Lactic Acid (POC) 1.25 0.40 - 2.00 mmol/L    Critical value read back KEISHA     pH, venous (POC) 7.32 7.32 - 7.42      pCO2, venous (POC) 29.1 (L) 41 - 51 MMHG    pO2, venous (POC) 22 (L) 25 - 40 mmHg   GLUCOSE, POC    Collection Time: 11/05/21  8:50 PM   Result Value Ref Range    Glucose (POC) 75 65 - 117 mg/dL    Performed by Efraín Crenshaw (LYNNE RN)

## 2021-11-06 NOTE — PROGRESS NOTES
Pharmacist Daily Dosing of Warfarin    Indication & Goal INR: AFib, INR Goal 2-3   has pacemaker, Hx DVT and graft thromboses, Hx HIT    PTA Warfarin Dose: 2.5 mg daily    Notable concurrent conditions and medications:     Labs:  Recent Labs     11/06/21  0041 11/05/21  1634   INR 2.5* 2.7*   HGB  --  8.0*   PLT  --  216   TBILI  --  0.5   ALB  --  2.9*       Impression/Plan:   HD PTA  Warfarin 3mg   aily INR has been ordered  CBC w/o differential every other day has been ordered     Pharmacy will follow daily and adjust the dose as appropriate.     Thank you,  Sharona Martin, [facility]

## 2021-11-06 NOTE — ED NOTES
Bedside shift change report given to [address] [place] [address] (oncoming nurse) by Gretta Childress (offgoing nurse). Report included the following information SBAR, ED Summary, Intake/Output and Recent Results.

## 2021-11-06 NOTE — PROGRESS NOTES
TRANSFER - IN REPORT:    Verbal report received from [facility] on Elizabeth Crowley  being received from [facility](unit) for ordered procedure      Report consisted of patients Situation, Background, Assessment and   Recommendations(SBAR). Information from the following report(s) Kardex was reviewed with the receiving nurse. Opportunity for questions and clarification was provided. Assessment completed upon patients arrival to unit and care assumed.

## 2021-11-06 NOTE — ED NOTES
Report received from [facility]. They advised of the patient's chief complaint, current status, orders completed (to include IV access/medications/radiology testing), outstanding orders that still need to be completed, and the treatment plan. Questions asked and answered prior to assumption of care.

## 2021-11-06 NOTE — PROGRESS NOTES
TRANSFER - OUT REPORT:    Verbal report given to Ismael on Vin Damon  being transferred to [facility](unit) for ordered procedure       Report consisted of patients Situation, Background, Assessment and   Recommendations(SBAR). Information from the following report(s) Kardex was reviewed with the receiving nurse. Opportunity for questions and clarification was provided.       Patient transported with:   Referron

## 2021-11-06 NOTE — ED NOTES
Patient is being transferred to [address], Room # 2134. Report given to MANUELITO Beach on Pearl Hernandez for routine progression of care. Report consisted of the following information SBAR, Kardex and ED Summary. Patient transferred to receiving unit by: tech (RN or tech name). Outstanding consults needed: No     Next labs due: No     The following personal items will be sent with the patient during transfer to the floor:     All valuables:    Cardiac monitoring ordered: Yes    The following CURRENT information was reported to the receiving RN:    Code status: Full Code at time of transfer    Last set of vital signs:  Vital Signs  Level of Consciousness: Alert (0) (11/05/21 1627)  Temp: 99.9 °F (37.7 °C) (11/05/21 1627)  Temp Source: Oral (11/05/21 1627)  Pulse (Heart Rate): 94 (11/05/21 2240)  Resp Rate: (!) 32 (11/05/21 2240)  BP: (!) 142/84 (11/05/21 2240)  MAP (Monitor): 98 (11/05/21 2240)  MAP (Calculated): 103 (11/05/21 2240)  BP 1 Location: Left arm (11/05/21 1627)  BP 1 Method: Automatic (11/05/21 1627)  BP Patient Position: At rest (11/05/21 1627)  MEWS Score: 3 (11/05/21 1627)         Oxygen Therapy  O2 Sat (%): 100 % (11/05/21 2039)  Pulse via Oximetry: 93 beats per minute (11/05/21 2039)  O2 Device: None (Room air) (11/05/21 1627)      Last pain assessment:  Pain 1  Pain Scale 1: Numeric (0 - 10)  Pain Intensity 1: 6  Patient Stated Pain Goal: 0  Pain Reassessment 1: Yes  Pain Onset 1: 3 days  Pain Location 1: Abdomen      Wounds: No     Urinary catheter: voiding  Is there a oneal order: No     LDAs:       Peripheral IV 11/05/21 Anterior; Right Other(comment) (Active)       Peripheral IV 24/70/42 Right Basilic (Active)   Site Assessment Clean, dry, & intact 11/05/21 2359   Phlebitis Assessment 0 11/05/21 2359   Infiltration Assessment 0 11/05/21 2359   Dressing Status Clean, dry, & intact 11/05/21 2359   Dressing Type Tape; Transparent 11/05/21 2359   Hub Color/Line Status Green; Flushed; Patent 11/05/21 2359   Action Taken Blood drawn 11/05/21 2359         Opportunity for questions and clarification was provided.     Travis Nova

## 2021-11-06 NOTE — PROGRESS NOTES
Hospitalist Progress Note    NAME: German Story   :  1975   MRN:  [id number]       Assessment / Plan:    Nausea vomiting  Generalized body ache  Sepsis of unknown etiology  Sepsis indicatorstachycardia, tachypnea, fever. CT abdomen/pelvisno acute abnormality. Chest x-rayNo focal airspace process. Mild cardiomegaly with probable pulmonary  arterial enlargement. Has a similar presentation in 2021 when the patient has persistent bacteremia and got ICD removed. --No record of fever inpatient. Mild leukocytosis. PCT elevated at 3.3  Blood culture pending. Continue empiric vancomycin and Zosyn. Lactic acid is within normal limits.     End-stage renal disease on dialysis  Hyperkalemia, hyponatremia, and met acidosis secondary to above  Nephrology consulted. Continue with the PhosLo and calcitriol.     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 Pharmacy consulted for Coumadin dosing. -INR within goal    18.5 - 24.9 Normal weight / Body mass index is 22.79 kg/m². Estimated discharge date:   Barriers:      Code Status: Full code  Surrogate Decision Maker:     DVT Prophylaxis: Coumadin  GI Prophylaxis: not indicated     Baseline: Ambulatory at home     Subjective:     Chief Complaint / Reason for Physician Visit  Discussed with RN events overnight. Reports feeling better. Less body ache today. Review of Systems:  Symptom Y/N Comments  Symptom Y/N Comments   Fever/Chills    Chest Pain     Poor Appetite    Edema     Cough    Abdominal Pain     Sputum    Joint Pain     SOB/ZAMUDIO    Pruritis/Rash     Nausea/vomit    Tolerating PT/OT     Diarrhea    Tolerating Diet     Constipation    Other       Could NOT obtain due to:      Objective:     VITALS:   Last 24hrs VS reviewed since prior progress note.  Most recent are:  Patient Vitals for the past 24 hrs:   Temp Pulse Resp BP SpO2   11/05/21 2240  94 (!) 32 (!) 142/84    11/05/21 2039  92 14  100 %   11/05/21 1901  94 30  100 %   11/05/21 1900  92 23 121/87    11/05/21 1840  92 19 123/80    11/05/21 1839  89 16  95 %   11/05/21 1823    129/85    11/05/21 1627 99.9 °F (37.7 °C) (!) 101 22 125/79 98 %     No intake or output data in the 24 hours ending 11/06/21 0818     I had a face to face encounter and independently examined this patient on 11/6/2021, as outlined below:  PHYSICAL EXAM:  General: WD, WN. Alert, cooperative, no acute distress    EENT:  EOMI. Anicteric sclerae. MMM  Resp:  CTA bilaterally, no wheezing or rales. No accessory muscle use  CV:  Regular  rhythm,  +3 bilateral LE edema, ICD scar without evidence of infection, LUE AV graft  GI:  Soft, Non distended, Non tender. +Bowel sounds  Neurologic:  Alert and oriented X 3, normal speech,   Psych:   Good insight. Not anxious nor agitated  Skin:  No rashes. No jaundice    Reviewed most current lab test results and cultures  YES  Reviewed most current radiology test results   YES  Review and summation of old records today    NO  Reviewed patient's current orders and MAR    YES  PMH/SH reviewed - no change compared to H&P  ________________________________________________________________________  Care Plan discussed with:    Comments   Patient x    Family      RN x    Care Manager     Consultant                        Multidiciplinary team rounds were held today with , nursing, pharmacist and clinical coordinator. Patient's plan of care was discussed; medications were reviewed and discharge planning was addressed.      ________________________________________________________________________  Total NON critical care TIME:  35  Minutes    Total CRITICAL CARE TIME Spent:   Minutes non procedure based      Comments   >50% of visit spent in counseling and coordination of care x ________________________________________________________________________  Lio Chaves MD     Procedures: see electronic medical records for all procedures/Xrays and details which were not copied into this note but were reviewed prior to creation of Plan. LABS:  I reviewed today's most current labs and imaging studies.   Pertinent labs include:  Recent Labs     11/05/21  1634   WBC 12.3*   HGB 8.0*   HCT 23.3*        Recent Labs     11/06/21  0041 11/05/21  1634   * 128*   K 5.3* 5.0   CL 92* 92*   CO2 19* 19*   GLU 91 91   BUN 91* 88*   CREA 16.00* 15.60*   CA 8.3* 7.7*   MG 2.4  --    ALB  --  2.9*   TBILI  --  0.5   ALT  --  8*   INR 2.5* 2.7*       Signed: Lio Chaves MD

## 2021-11-06 NOTE — ED PROVIDER NOTES
EMERGENCY DEPARTMENT HISTORY AND PHYSICAL EXAM      Date: 11/5/2021  Patient Name: German Story    History of Presenting Illness     Chief Complaint   Patient presents with    Vomiting     pt state n/v x 3 days extensive Cardiac and Kidney hx.  Nausea    Generalized Body Aches       History Provided By: Patient    HPI: German Story, 42 y.o. male with PMHx significant for ESRD on home hemodialysis, CAD, status post AICD placement, chronic pain, who presents with a chief complaint of nausea, vomiting, body aches for the last 3 days. Reports a T-max of 100.0 at home. He reports generalized, cramping abdominal pain that has been constant. No exacerbating or alleviating factors. States that he has not taken his medications as every time he takes anything by mouth he throws up. States symptoms feel similar to when he was bacteremic in the past.  Tells me this was related to a wire on his last pacemaker that had to be removed. Additionally reports he has not done any dialysis for last 5 days as he felt generally unwell. PCP: Len Garcia MD    There are no other complaints, changes, or physical findings at this time.     Current Facility-Administered Medications   Medication Dose Route Frequency Provider Last Rate Last Admin    [START ON 11/6/2021] aspirin chewable tablet 81 mg  81 mg Oral DAILY Roberta Simpson MD        [START ON 11/6/2021] calcitRIOL (ROCALTROL) capsule 0.5 mcg  0.5 mcg Oral DAILY Roberta Simpson MD        [START ON 11/6/2021] atorvastatin (LIPITOR) tablet 20 mg  20 mg Oral DAILY Roberta Simpson MD        [START ON 11/6/2021] calcium acetate(phosphat bind) (PHOSLO) capsule 1,334 mg  2 Capsule Oral TID WITH MEALS Roberta Simpson MD        [START ON 11/6/2021] metoprolol succinate (TOPROL-XL) XL tablet 12.5 mg  12.5 mg Oral DAILY Roberta Simpson MD        [START ON 11/6/2021] pantoprazole (PROTONIX) tablet 40 mg  40 mg Oral ACB Roberta Simpson MD       [facility] piperacillin-tazobactam (ZOSYN) 3.375 g in 0.9% sodium chloride (MBP/ADV) 100 mL MBP  3.375 g IntraVENous ONCE Aldo Wick MD        Followed by   Seferino Pruitt ON 11/6/2021] piperacillin-tazobactam (ZOSYN) 3.375 g in 0.9% sodium chloride (MBP/ADV) 100 mL MBP  3.375 g IntraVENous Q12H Aldo Wick MD        vancomycin (VANCOCIN) 1750 mg in  ml infusion  1,750 mg IntraVENous NOW Aldo Wick MD        VANCOMYCIN INFORMATION NOTE   Other Rx Dosing/Monitoring Aldo Wick MD        sodium chloride (NS) flush 5-40 mL  5-40 mL IntraVENous Q8H Aldo Wick MD        sodium chloride (NS) flush 5-40 mL  5-40 mL IntraVENous PRN Aldo Wick MD        acetaminophen (TYLENOL) tablet 650 mg  650 mg Oral Q6H PRN Aldo Wick MD        Or    acetaminophen (TYLENOL) suppository 650 mg  650 mg Rectal Q6H PRN Aldo Wick MD        polyethylene glycol (MIRALAX) packet 17 g  17 g Oral DAILY PRN Aldo Wick MD        ondansetron (ZOFRAN ODT) tablet 4 mg  4 mg Oral Q8H PRN Aldo Wick MD        Or    ondansetron (ZOFRAN) injection 4 mg  4 mg IntraVENous Q6H PRN Aldo Wick MD         Current Outpatient Medications   Medication Sig Dispense Refill    ondansetron (Zofran ODT) 4 mg disintegrating tablet 1 Tab by SubLINGual route every eight (8) hours as needed for Nausea or Vomiting. 20 Tab 0    metoprolol succinate (TOPROL-XL) 25 mg XL tablet Take 0.5 Tabs by mouth daily. 30 Tab 0    calcium acetate,phosphat bind, (PHOSLO) 667 mg cap Take 2 Caps by mouth three (3) times daily (with meals). Indications: low amount of calcium in the blood, renal osteodystrophy with hyperphosphatemia 120 Cap 0    atorvastatin (Lipitor) 20 mg tablet Take 20 mg by mouth daily.  warfarin (COUMADIN) 2.5 mg tablet Take 2.5 mg by mouth daily.  acetaminophen (TYLENOL) 500 mg tablet Take 1 Tab by mouth every four (4) hours as needed for Pain.  Over the counter 30 Tab 0    omeprazole (PRILOSEC) 20 mg capsule Take 20 mg by mouth daily.  calcitRIOL (ROCALTROL) 0.5 mcg capsule Take 0.5 mcg by mouth daily.  aspirin 81 mg chewable tablet Take 81 mg by mouth daily. Past History     Past Medical History:  Past Medical History:   Diagnosis Date    Abdominal hematoma     AICD (automatic cardioverter/defibrillator) present     CAD (coronary artery disease)     anterior MI s/p 2 stents  2007    Chronic abdominal pain     Chronic kidney disease     ESRD; Dialysis dependent.  Home [facility] does labs- [facility] tpke    DVT (deep venous thrombosis) ([address] 2001    DVT of popliteal vein ([address] 11/2011    not anticoagulated due to bleeding, IVC filter    Endocarditis     Gallstone pancreatitis     Gastrointestinal disorder     acid reflux    Gastrointestinal disorder     peptic ulcer    [facility] [address] [address]     High cholesterol     Hypertension     Kidney transplant     b/l kidneys 1995, 2001    Long term current use of anticoagulant therapy     Nephrotic syndrome     Other ill-defined conditions(799.89)     kidny transplant x2,  on dialysis    Other ill-defined conditions(799.89)     home dialysis    Other ill-defined conditions(799.89)     hx recurrent left leg DVT    Peritonitis ([address]     Seizures ([address] 2015    most recent- only had three in lifetime    Small bowel obstruction (HCC)     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    V-tach [facility])      Past Surgical History:  Past Surgical History:   Procedure Laterality Date    HX APPENDECTOMY      HX CHOLECYSTECTOMY      HX OTHER SURGICAL      cholecystectomy    HX OTHER SURGICAL  11/2011    exploratory laparotomy    HX OTHER SURGICAL      fem-pop    HX OTHER SURGICAL  02/2013    right upper extremity fistula    HX PACEMAKER Left 6/2009    AICD-st latonya-not connected    HX PACEMAKER Left     AICD-left side-BS-working    HX SMALL BOWEL RESECTION      HX TRANSPLANT  2001     Kidney    HX TRANSPLANT  1992    kidney    HX UROLOGICAL      2 kidney transplants    HX VASCULAR ACCESS Right     femoral access for HD- does 5 day dialysis @ home    IR REMOVE TUNL CVAD W/O PORT / PUMP  10/29/2020    IR REPLACE CVC TUNNELED W/O PORT  9/10/2020    HI CARDIAC SURG PROCEDURE UNLIST  2007    2 stents    HI EDG US EXAM SURGICAL ALTER STOM DUODENUM/JEJUNUM  7/15/2011         HI ESOPHAGOGASTRODUODENOSCOPY TRANSORAL DIAGNOSTIC  5/24/2012         VASCULAR SURGERY PROCEDURE UNLIST  11/14/2017    Insertion AV graft right upper arm (bovine); ligation of AV fistula right arm     Family History:  Family History   Problem Relation Age of Onset    COPD Mother     Lung Disease Mother     Cancer Father         stomach    Cancer Maternal Grandmother      Social History:  Social History     Tobacco Use    Smoking status: Never Smoker    Smokeless tobacco: Never Used   Substance Use Topics    Alcohol use: No    Drug use: Yes     Types: Prescription, OTC     Allergies: Allergies   Allergen Reactions    Shellfish Containing Products Swelling     Break out in rash, trouble breathing    Contrast Agent [Iodine] Shortness of Breath    Heparin Analogues Other (comments)     Positive history of HIT     Review of Systems   Review of Systems   Constitutional: Positive for fever. Negative for chills. HENT: Negative for congestion, rhinorrhea and sore throat. Respiratory: Negative for cough and shortness of breath. Cardiovascular: Negative for chest pain. Gastrointestinal: Positive for abdominal pain, nausea and vomiting. Genitourinary: Negative for dysuria and urgency. Skin: Negative for rash. Neurological: Negative for dizziness, light-headedness and headaches. All other systems reviewed and are negative. Physical Exam   Physical Exam  Vitals and nursing note reviewed. Constitutional:       General: He is not in acute distress. Appearance: He is well-developed.    HENT:      Head: Normocephalic and atraumatic. Eyes:      Conjunctiva/sclera: Conjunctivae normal.      Pupils: Pupils are equal, round, and reactive to light. Cardiovascular:      Rate and Rhythm: Normal rate and regular rhythm. Pulmonary:      Effort: Pulmonary effort is normal. No respiratory distress. Breath sounds: Normal breath sounds. No stridor. Abdominal:      General: There is no distension. Palpations: Abdomen is soft. Tenderness: There is generalized abdominal tenderness. Musculoskeletal:         General: Normal range of motion. Cervical back: Normal range of motion. Comments: Fistula lue   Skin:     General: Skin is warm and dry. Neurological:      Mental Status: He is alert and oriented to person, place, and time. Diagnostic Study Results   Labs -     Recent Results (from the past 12 hour(s))   CBC W/O DIFF    Collection Time: 11/05/21  4:34 PM   Result Value Ref Range    WBC 12.3 (H) 4.1 - 11.1 K/uL    RBC 2.52 (L) 4.10 - 5.70 M/uL    HGB 8.0 (L) 12.1 - 17.0 g/dL    HCT 23.3 (L) 36.6 - 50.3 %    MCV 92.5 80.0 - 99.0 FL    MCH 31.7 26.0 - 34.0 PG    MCHC 34.3 30.0 - 36.5 g/dL    RDW 16.2 (H) 11.5 - 14.5 %    PLATELET 397 062 - 935 K/uL    MPV 10.8 8.9 - 12.9 FL    NRBC 0.0 0  WBC    ABSOLUTE NRBC 0.00 0.00 - 8.30 K/uL   METABOLIC PANEL, COMPREHENSIVE    Collection Time: 11/05/21  4:34 PM   Result Value Ref Range    Sodium 128 (L) 136 - 145 mmol/L    Potassium 5.0 3.5 - 5.1 mmol/L    Chloride 92 (L) 97 - 108 mmol/L    CO2 19 (L) 21 - 32 mmol/L    Anion gap 17 (H) 5 - 15 mmol/L    Glucose 91 65 - 100 mg/dL    BUN 88 (H) 6 - 20 MG/DL    Creatinine 15.60 (H) 0.70 - 1.30 MG/DL    BUN/Creatinine ratio 6 (L) 12 - 20      GFR est AA 4 (L) >60 ml/min/1.73m2    GFR est non-AA 3 (L) >60 ml/min/1.73m2    Calcium 7.7 (L) 8.5 - 10.1 MG/DL    Bilirubin, total 0.5 0.2 - 1.0 MG/DL    ALT (SGPT) 8 (L) 12 - 78 U/L    AST (SGOT) 8 (L) 15 - 37 U/L    Alk.  phosphatase 143 (H) 45 - 117 U/L    Protein, total 8.1 6.4 - 8.2 g/dL    Albumin 2.9 (L) 3.5 - 5.0 g/dL    Globulin 5.2 (H) 2.0 - 4.0 g/dL    A-G Ratio 0.6 (L) 1.1 - 2.2     LIPASE    Collection Time: 11/05/21  4:34 PM   Result Value Ref Range    Lipase 91 73 - 393 U/L   SAMPLES BEING HELD    Collection Time: 11/05/21  4:34 PM   Result Value Ref Range    SAMPLES BEING HELD 1red, 1 pst, 1pedbc     COMMENT        Add-on orders for these samples will be processed based on acceptable specimen integrity and analyte stability, which may vary by analyte. PTT    Collection Time: 11/05/21  4:34 PM   Result Value Ref Range    aPTT 47.0 (H) 22.1 - 31.0 sec    aPTT, therapeutic range     58.0 - 77.0 SECS   PROTHROMBIN TIME + INR    Collection Time: 11/05/21  4:34 PM   Result Value Ref Range    INR 2.7 (H) 0.9 - 1.1      Prothrombin time 26.6 (H) 9.0 - 11.1 sec   BLOOD GAS,CHEM8,LACTIC ACID POC    Collection Time: 11/05/21  7:02 PM   Result Value Ref Range    Calcium, ionized (POC) 0.79 (LL) 1.12 - 1.32 mmol/L    BICARBONATE 15 mmol/L    Base deficit (POC) 10.3 mmol/L    Sample source VENOUS BLOOD      CO2, POC 16 (L) 19 - 24 MMOL/L    Sodium,  (L) 136 - 145 MMOL/L    Potassium, POC 4.7 3.5 - 5.5 MMOL/L    Chloride,  100 - 108 MMOL/L    Glucose, POC 61 (L) 74 - 106 MG/DL    Creatinine, POC 11.6 (H) 0.6 - 1.3 MG/DL    Lactic Acid (POC) 1.25 0.40 - 2.00 mmol/L    Critical value read back KEISHA     pH, venous (POC) 7.32 7.32 - 7.42      pCO2, venous (POC) 29.1 (L) 41 - 51 MMHG    pO2, venous (POC) 22 (L) 25 - 40 mmHg   GLUCOSE, POC    Collection Time: 11/05/21  8:50 PM   Result Value Ref Range    Glucose (POC) 75 65 - 117 mg/dL    Performed by Darrell Muller (TRV RN)        Radiologic Studies -   XR CHEST PORT   Final Result   No focal airspace process.  Mild cardiomegaly with probable pulmonary   arterial enlargement      CT ABD PELV WO CONT   Final Result   No acute abdominal or pelvic process seen        CT ABD PELV WO CONT    Result Date: 11/5/2021  No acute abdominal or pelvic process seen    XR CHEST PORT    Result Date: 11/5/2021  No focal airspace process. Mild cardiomegaly with probable pulmonary arterial enlargement    Medical Decision Making   I am the first provider for this patient. I reviewed the vital signs, available nursing notes, past medical history, past surgical history, family history and social history. Vital Signs-Reviewed the patient's vital signs. Patient Vitals for the past 12 hrs:   Temp Pulse Resp BP SpO2   11/05/21 2240  94 (!) 32 (!) 142/84    11/05/21 2039  92 14  100 %   11/05/21 1901  94 30  100 %   11/05/21 1900  92 23 121/87    11/05/21 1840  92 19 123/80    11/05/21 1839  89 16  95 %   11/05/21 1823    129/85    11/05/21 1627 99.9 °F (37.7 °C) (!) 101 22 125/79 98 %       Pulse Oximetry Analysis - 100% on ra    Cardiac Monitor:   Rate: 94 bpm  Rhythm: Normal Sinus Rhythm        Records Reviewed: Nursing Notes and Old Medical Records    Provider Notes (Medical Decision Making):   Patient presents with a chief complaint of abdominal pain, nausea, vomiting, body aches, temperature at home of 100. Patient has a complicated medical history including prior bacteremia. On exam he is not acutely toxic appearing, has generalized abdominal tenderness. Differential includes intra-abdominal process, UTI, diverticulitis, bacteremia. Will check basic lab work, CT abdomen, chest x-ray, treat her pain and nausea and reevaluate. ED Course:   Initial assessment performed. The patients presenting problems have been discussed, and they are in agreement with the care plan formulated and outlined with them. I have encouraged them to ask questions as they arise throughout their visit. Patient without acute findings on lab work or imaging. He does have a creatinine of 15 today but is not on dialysis and 5 days. Potassium of 5.0. I suspect is uremia could be contributing to his vomiting.   Discussed with Dr. Neville Galeazzi, hospitalist, for admission. Procedures:  Procedures    Critical Care:  none    Disposition:    Admission Note:  Patient is being admitted to the hospital by Dr. Catracho Gonzales, Service: Hospitalist.  The results of their tests and reasons for their admission have been discussed with them and available family. They convey agreement and understanding for the need to be admitted and for their admission diagnosis. Diagnosis     Clinical Impression:   1. Intractable nausea and vomiting    2. ESRD (end stage renal disease) on dialysis [facility])            Please note that this dictation was completed with Minus, the computer voice recognition software. Quite often unanticipated grammatical, syntax, homophones, and other interpretive errors are inadvertently transcribed by the computer software. Please disregard these errors.   Please excuse any errors that have escaped final proofreading

## 2021-11-06 NOTE — PROGRESS NOTES
End of Shift Note    Bedside shift change report given to Alberto BILLINGS (oncoming nurse) by Magnus Casas (offgoing nurse). Report included the following information SBAR, Kardex, Procedure Summary, Intake/Output, MAR and Recent Results    Shift worked:  7-7pm   Shift summary and any significant changes:       No significant changes during the shift.       Concerns for physician to address:  discussed on roundings   Zone phone for oncoming shift:              Magnus Casas

## 2021-11-06 NOTE — PROGRESS NOTES
Received message from patient's nurse stating:    pt requesting dilauted. states morphine didn't help his pain and the dilauded in the ED he got worked. Discussion / orders:    Dilaudid 0.5 mg IV every 4 hours as needed severe pain         Please note that this note was dictated using Dragon computer voice recognition software. Quite often unanticipated grammatical, syntax, homophones, and other interpretive errors are inadvertently transcribed by the computer software. Please disregard these errors. Please excuse any errors that have escaped final proofreading.

## 2021-11-07 ENCOUNTER — APPOINTMENT (OUTPATIENT)
Dept: NON INVASIVE DIAGNOSTICS | Age: 46
DRG: 853 | End: 2021-11-07
Attending: STUDENT IN AN ORGANIZED HEALTH CARE EDUCATION/TRAINING PROGRAM
Payer: MEDICARE

## 2021-11-07 LAB
ANION GAP SERPL CALC-SCNC: 11 MMOL/L (ref 5–15)
BASOPHILS # BLD: 0 K/UL (ref 0–0.1)
BASOPHILS NFR BLD: 0 % (ref 0–1)
BUN SERPL-MCNC: 41 MG/DL (ref 6–20)
BUN/CREAT SERPL: 5 (ref 12–20)
CALCIUM SERPL-MCNC: 8.3 MG/DL (ref 8.5–10.1)
CHLORIDE SERPL-SCNC: 96 MMOL/L (ref 97–108)
CO2 SERPL-SCNC: 25 MMOL/L (ref 21–32)
CREAT SERPL-MCNC: 8.99 MG/DL (ref 0.7–1.3)
DIFFERENTIAL METHOD BLD: ABNORMAL
ECHO AO ASC DIAM: 3.18 CM
ECHO AV AREA PEAK VELOCITY: 2.04 CM2
ECHO AV AREA VTI: 1.88 CM2
ECHO AV AREA/BSA PEAK VELOCITY: 1.1 CM2/M2
ECHO AV AREA/BSA VTI: 1 CM2/M2
ECHO AV MEAN GRADIENT: 5.9 MMHG
ECHO AV PEAK GRADIENT: 10.01 MMHG
ECHO AV PEAK VELOCITY: 158.19 CM/S
ECHO AV VTI: 28.83 CM
ECHO IVC PROX: 1.79 CM
ECHO LA MAJOR AXIS: 4.49 CM
ECHO LA MINOR AXIS: 2.49 CM
ECHO LV E' LATERAL VELOCITY: 6.16 CM/S
ECHO LV E' SEPTAL VELOCITY: 3.69 CM/S
ECHO LV INTERNAL DIMENSION DIASTOLIC: 6.11 CM (ref 4.2–5.9)
ECHO LV INTERNAL DIMENSION SYSTOLIC: 4.86 CM
ECHO LV IVSD: 0.78 CM (ref 0.6–1)
ECHO LV MASS 2D: 243.2 G (ref 88–224)
ECHO LV MASS INDEX 2D: 135.1 G/M2 (ref 49–115)
ECHO LV POSTERIOR WALL DIASTOLIC: 1.15 CM (ref 0.6–1)
ECHO LVOT DIAM: 2.28 CM
ECHO LVOT PEAK GRADIENT: 2.51 MMHG
ECHO LVOT PEAK VELOCITY: 79.21 CM/S
ECHO LVOT SV: 54.3 ML
ECHO LVOT VTI: 13.32 CM
ECHO MV A VELOCITY: 58.79 CM/S
ECHO MV AREA VTI: 2.42 CM2
ECHO MV E DECELERATION TIME (DT): 148.99 MS
ECHO MV E VELOCITY: 88.63 CM/S
ECHO MV E/A RATIO: 1.51
ECHO MV E/E' LATERAL: 14.39
ECHO MV E/E' RATIO (AVERAGED): 19.2
ECHO MV E/E' SEPTAL: 24.02
ECHO MV MAX VELOCITY: 97.56 CM/S
ECHO MV MEAN GRADIENT: 1.74 MMHG
ECHO MV PEAK GRADIENT: 3.81 MMHG
ECHO MV VTI: 22.38 CM
ECHO PV MAX VELOCITY: 125.59 CM/S
ECHO PV PEAK INSTANTANEOUS GRADIENT SYSTOLIC: 6.31 MMHG
ECHO PV REGURGITANT MAX VELOCITY: 122.63 CM/S
ECHO TV REGURGITANT MAX VELOCITY: 383.63 CM/S
ECHO TV REGURGITANT PEAK GRADIENT: 60.12 MMHG
EOSINOPHIL # BLD: 0.3 K/UL (ref 0–0.4)
EOSINOPHIL NFR BLD: 3 % (ref 0–7)
ERYTHROCYTE [DISTWIDTH] IN BLOOD BY AUTOMATED COUNT: 16.1 % (ref 11.5–14.5)
GLUCOSE SERPL-MCNC: 96 MG/DL (ref 65–100)
HCT VFR BLD AUTO: 19.9 % (ref 36.6–50.3)
HCT VFR BLD AUTO: 22.7 % (ref 36.6–50.3)
HGB BLD-MCNC: 6.6 G/DL (ref 12.1–17)
HGB BLD-MCNC: 7.6 G/DL (ref 12.1–17)
HISTORY CHECKED?,CKHIST: NORMAL
IMM GRANULOCYTES # BLD AUTO: 0.1 K/UL (ref 0–0.04)
IMM GRANULOCYTES NFR BLD AUTO: 1 % (ref 0–0.5)
INR PPP: 2.1 (ref 0.9–1.1)
LVOT MG: 1.5 MMHG
LYMPHOCYTES # BLD: 0.7 K/UL (ref 0.8–3.5)
LYMPHOCYTES NFR BLD: 6 % (ref 12–49)
MCH RBC QN AUTO: 31 PG (ref 26–34)
MCHC RBC AUTO-ENTMCNC: 33.2 G/DL (ref 30–36.5)
MCV RBC AUTO: 93.4 FL (ref 80–99)
MONOCYTES # BLD: 0.9 K/UL (ref 0–1)
MONOCYTES NFR BLD: 8 % (ref 5–13)
NEUTS SEG # BLD: 8.9 K/UL (ref 1.8–8)
NEUTS SEG NFR BLD: 82 % (ref 32–75)
NRBC # BLD: 0 K/UL (ref 0–0.01)
NRBC BLD-RTO: 0 PER 100 WBC
PLATELET # BLD AUTO: 147 K/UL (ref 150–400)
PMV BLD AUTO: 11 FL (ref 8.9–12.9)
POTASSIUM SERPL-SCNC: 4 MMOL/L (ref 3.5–5.1)
PROTHROMBIN TIME: 20.8 SEC (ref 9–11.1)
RBC # BLD AUTO: 2.13 M/UL (ref 4.1–5.7)
RBC MORPH BLD: ABNORMAL
RBC MORPH BLD: ABNORMAL
SODIUM SERPL-SCNC: 132 MMOL/L (ref 136–145)
WBC # BLD AUTO: 10.9 K/UL (ref 4.1–11.1)

## 2021-11-07 PROCEDURE — 74011250637 HC RX REV CODE- 250/637: Performed by: STUDENT IN AN ORGANIZED HEALTH CARE EDUCATION/TRAINING PROGRAM

## 2021-11-07 PROCEDURE — 74011000258 HC RX REV CODE- 258: Performed by: STUDENT IN AN ORGANIZED HEALTH CARE EDUCATION/TRAINING PROGRAM

## 2021-11-07 PROCEDURE — 86923 COMPATIBILITY TEST ELECTRIC: CPT

## 2021-11-07 PROCEDURE — 80048 BASIC METABOLIC PNL TOTAL CA: CPT

## 2021-11-07 PROCEDURE — 36415 COLL VENOUS BLD VENIPUNCTURE: CPT

## 2021-11-07 PROCEDURE — 87040 BLOOD CULTURE FOR BACTERIA: CPT

## 2021-11-07 PROCEDURE — 74011250636 HC RX REV CODE- 250/636: Performed by: INTERNAL MEDICINE

## 2021-11-07 PROCEDURE — 74011250636 HC RX REV CODE- 250/636: Performed by: STUDENT IN AN ORGANIZED HEALTH CARE EDUCATION/TRAINING PROGRAM

## 2021-11-07 PROCEDURE — 85025 COMPLETE CBC W/AUTO DIFF WBC: CPT

## 2021-11-07 PROCEDURE — 85610 PROTHROMBIN TIME: CPT

## 2021-11-07 PROCEDURE — 86644 CMV ANTIBODY: CPT

## 2021-11-07 PROCEDURE — 86901 BLOOD TYPING SEROLOGIC RH(D): CPT

## 2021-11-07 PROCEDURE — 85018 HEMOGLOBIN: CPT

## 2021-11-07 PROCEDURE — 93306 TTE W/DOPPLER COMPLETE: CPT

## 2021-11-07 PROCEDURE — 65270000029 HC RM PRIVATE

## 2021-11-07 PROCEDURE — 74011250636 HC RX REV CODE- 250/636: Performed by: NURSE PRACTITIONER

## 2021-11-07 PROCEDURE — P9016 RBC LEUKOCYTES REDUCED: HCPCS

## 2021-11-07 PROCEDURE — 90935 HEMODIALYSIS ONE EVALUATION: CPT

## 2021-11-07 RX ORDER — WARFARIN 1 MG/1
3 TABLET ORAL ONCE
Status: COMPLETED | OUTPATIENT
Start: 2021-11-07 | End: 2021-11-07

## 2021-11-07 RX ORDER — OXYCODONE HYDROCHLORIDE 5 MG/1
5 TABLET ORAL
Status: DISCONTINUED | OUTPATIENT
Start: 2021-11-07 | End: 2021-11-18 | Stop reason: HOSPADM

## 2021-11-07 RX ORDER — SODIUM CHLORIDE 9 MG/ML
250 INJECTION, SOLUTION INTRAVENOUS AS NEEDED
Status: DISCONTINUED | OUTPATIENT
Start: 2021-11-07 | End: 2021-11-18 | Stop reason: HOSPADM

## 2021-11-07 RX ORDER — HYDROMORPHONE HYDROCHLORIDE 1 MG/ML
1 INJECTION, SOLUTION INTRAMUSCULAR; INTRAVENOUS; SUBCUTANEOUS
Status: DISCONTINUED | OUTPATIENT
Start: 2021-11-07 | End: 2021-11-18 | Stop reason: HOSPADM

## 2021-11-07 RX ADMIN — WARFARIN SODIUM 3 MG: 1 TABLET ORAL at 17:03

## 2021-11-07 RX ADMIN — HYDROMORPHONE HYDROCHLORIDE 0.5 MG: 1 INJECTION, SOLUTION INTRAMUSCULAR; INTRAVENOUS; SUBCUTANEOUS at 12:34

## 2021-11-07 RX ADMIN — PANTOPRAZOLE SODIUM 40 MG: 40 TABLET, DELAYED RELEASE ORAL at 07:49

## 2021-11-07 RX ADMIN — ASPIRIN 81 MG: 81 TABLET, CHEWABLE ORAL at 08:31

## 2021-11-07 RX ADMIN — PIPERACILLIN AND TAZOBACTAM 3.38 G: 3; .375 INJECTION, POWDER, LYOPHILIZED, FOR SOLUTION INTRAVENOUS at 04:07

## 2021-11-07 RX ADMIN — CALCIUM ACETATE 1334 MG: 667 CAPSULE ORAL at 16:03

## 2021-11-07 RX ADMIN — HYDROMORPHONE HYDROCHLORIDE 0.5 MG: 1 INJECTION, SOLUTION INTRAMUSCULAR; INTRAVENOUS; SUBCUTANEOUS at 00:53

## 2021-11-07 RX ADMIN — HYDROMORPHONE HYDROCHLORIDE 1 MG: 1 INJECTION, SOLUTION INTRAMUSCULAR; INTRAVENOUS; SUBCUTANEOUS at 21:24

## 2021-11-07 RX ADMIN — CALCITRIOL CAPSULES 0.25 MCG 0.5 MCG: 0.25 CAPSULE ORAL at 08:31

## 2021-11-07 RX ADMIN — OXYCODONE 5 MG: 5 TABLET ORAL at 19:12

## 2021-11-07 RX ADMIN — ATORVASTATIN CALCIUM 20 MG: 20 TABLET, FILM COATED ORAL at 08:31

## 2021-11-07 RX ADMIN — HYDROMORPHONE HYDROCHLORIDE 1 MG: 1 INJECTION, SOLUTION INTRAMUSCULAR; INTRAVENOUS; SUBCUTANEOUS at 17:03

## 2021-11-07 RX ADMIN — HYDROMORPHONE HYDROCHLORIDE 0.5 MG: 1 INJECTION, SOLUTION INTRAMUSCULAR; INTRAVENOUS; SUBCUTANEOUS at 07:59

## 2021-11-07 RX ADMIN — Medication 10 ML: at 15:00

## 2021-11-07 RX ADMIN — PIPERACILLIN AND TAZOBACTAM 3.38 G: 3; .375 INJECTION, POWDER, LYOPHILIZED, FOR SOLUTION INTRAVENOUS at 16:03

## 2021-11-07 RX ADMIN — ACETAMINOPHEN 650 MG: 325 TABLET ORAL at 08:35

## 2021-11-07 RX ADMIN — OXYCODONE 5 MG: 5 TABLET ORAL at 10:51

## 2021-11-07 RX ADMIN — Medication 10 ML: at 21:25

## 2021-11-07 RX ADMIN — OXYCODONE 5 MG: 5 TABLET ORAL at 23:36

## 2021-11-07 RX ADMIN — CALCIUM ACETATE 1334 MG: 667 CAPSULE ORAL at 07:48

## 2021-11-07 RX ADMIN — VANCOMYCIN HYDROCHLORIDE 750 MG: 750 INJECTION, POWDER, LYOPHILIZED, FOR SOLUTION INTRAVENOUS at 21:24

## 2021-11-07 RX ADMIN — HYDROMORPHONE HYDROCHLORIDE 0.5 MG: 1 INJECTION, SOLUTION INTRAMUSCULAR; INTRAVENOUS; SUBCUTANEOUS at 04:07

## 2021-11-07 NOTE — PROGRESS NOTES
Received message from patient's nurse in regards to hemoglobin of 6.6             Discussion / orders:    1 unit packed red blood cell transfusion           Please note that this note was dictated using Dragon computer voice recognition software. Quite often unanticipated grammatical, syntax, homophones, and other interpretive errors are inadvertently transcribed by the computer software. Please disregard these errors. Please excuse any errors that have escaped final proofreading.

## 2021-11-07 NOTE — DIALYSIS
TRANSFER - IN REPORT:    Verbal report received from Louisa hyde Izabela Bullard  being received from Neville Starr 5 for ordered procedure      Report consisted of patients Situation, Background, Assessment and   Recommendations(SBAR). Information from the following report(s) SBAR was reviewed with the receiving nurse. Opportunity for questions and clarification was provided. Assessment completed upon patients arrival to unit and care assumed.

## 2021-11-07 NOTE — PROGRESS NOTES
End of Shift Note    Bedside shift change report given to Alberto BILLINGS (oncoming nurse) by Ciaran Barajas (offgoing nurse). Report included the following information SBAR, Kardex, Procedure Summary, Intake/Output, MAR and Recent Results    Shift worked:  7-7pm   Shift summary and any significant changes:       No significant changes during the shift. Pain med is administered, Pt on room air    Multiple unsuccessfull attempts on blood drawing for H&H check . ER charge nurse is notified to sent tech.         Concerns for physician to address:  discussed on roundings   Zone phone for oncoming shift:              Ciaran Barajas

## 2021-11-07 NOTE — PROGRESS NOTES
Hospitalist Progress Note    NAME: Ines Fernandez   :  1975   MRN:  [id number]       Assessment / Plan:    Sepsis of unknown etiology  Gram-positive cocci bacteremia  --Presented with generalized body ache, nausea, vomiting  Sepsis indicatorstachycardia, tachypnea, fever. CT abdomen/pelvisno acute abnormality. Chest x-rayNo focal airspace process. Mild cardiomegaly with probable pulmonary  arterial enlargement. Has a similar presentation in 2021 when the patient has persistent bacteremia and got ICD removed. --T-max 103 yesterday  --Mild leukocytosis. PCT elevated at 3.3  Blood culture on  growing GPC  bottles  Continue empiric vancomycin and Zosyn for now pending further culture data  --Send repeat blood culture  --Obtain echocardiogram       End-stage renal disease on dialysis  Hyperkalemia, hyponatremia, and met acidosis secondary to above  Nephrology consulted. Had HD yesterday. --Electrolytes improving  Continue with the PhosLo and calcitriol.     Acute on chronic anemia  -Hb dropped from 8-6.6. Will give 1 unit of PRBC. Check posttransfusion hemoglobin.  -On Coumadin for history of DVT and graft thrombosis. 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 Pharmacy consulted for Coumadin dosing. -INR within goal    18.5 - 24.9 Normal weight / Body mass index is 23.9 kg/m². Estimated discharge date:   Barriers:      Code Status: Full code  Surrogate Decision Maker:     DVT Prophylaxis: Coumadin  GI Prophylaxis: not indicated     Baseline: Ambulatory at home     Subjective:     Chief Complaint / Reason for Physician Visit  Discussed with RN events overnight. Complains of abdominal pain. Denies nausea, vomiting, diarrhea.      Review of Systems:  Symptom Y/N Comments  Symptom Y/N Comments   Fever/Chills    Chest Pain Poor Appetite    Edema     Cough    Abdominal Pain     Sputum    Joint Pain     SOB/ZAMUDIO    Pruritis/Rash     Nausea/vomit    Tolerating PT/OT     Diarrhea    Tolerating Diet     Constipation    Other       Could NOT obtain due to:      Objective:     VITALS:   Last 24hrs VS reviewed since prior progress note. Most recent are:  Patient Vitals for the past 24 hrs:   Temp Pulse Resp BP SpO2   11/07/21 0743 99.6 °F (37.6 °C) (!) 102 19 101/60 95 %   11/07/21 0058 99.9 °F (37.7 °C)       11/07/21 0030 (!) 101.4 °F (38.6 °C) 94 18 (!) 140/68 98 %   11/06/21 2141 (!) 102.7 °F (39.3 °C) 94 18 (!) 140/72 98 %   11/06/21 2037 (!) 103 °F (39.4 °C)       11/06/21 1633 99.8 °F (37.7 °C) 93 18 138/75 99 %   11/06/21 1545 98.6 °F (37 °C) 94 18 (!) 149/80    11/06/21 1530  88 18 (!) 157/47    11/06/21 1515  68 18 (!) 147/67    11/06/21 1500  84 18 139/78    11/06/21 1445  83 18 (!) 145/48    11/06/21 1430  82 20 133/68    11/06/21 1415  78 20 133/73    11/06/21 1400  78 20 (!) 142/57    11/06/21 1345  82 18 137/72    11/06/21 1330  80 18 128/63    11/06/21 1315  82 16 137/72    11/06/21 1300  82 16 133/72    11/06/21 1245  83 16 (!) 114/55    11/06/21 1230  80 18 (!) 109/50    11/06/21 1215 98.6 °F (37 °C) 89 18 129/61    11/06/21 1155 99.1 °F (37.3 °C) 89 14 105/67 97 %   11/06/21 0836 99.7 °F (37.6 °C) 96 20 125/82 96 %       Intake/Output Summary (Last 24 hours) at 11/7/2021 0803  Last data filed at 11/6/2021 1545  Gross per 24 hour   Intake    Output 2500 ml   Net -2500 ml        I had a face to face encounter and independently examined this patient on 11/7/2021, as outlined below:  PHYSICAL EXAM:  General: WD, WN. Alert, cooperative, no acute distress    EENT:  EOMI. Anicteric sclerae. MMM  Resp:  CTA bilaterally, no wheezing or rales.   No accessory muscle use  CV:  Regular  rhythm,  +3 bilateral LE edema, ICD scar without evidence of infection, LUE AV graft  GI:  Soft, Non distended, Non tender. +Bowel sounds  Neurologic:  Alert and oriented X 3, normal speech,   Psych:   Good insight. Not anxious nor agitated  Skin:  No rashes. No jaundice    Reviewed most current lab test results and cultures  YES  Reviewed most current radiology test results   YES  Review and summation of old records today    NO  Reviewed patient's current orders and MAR    YES  PMH/SH reviewed - no change compared to H&P  ________________________________________________________________________  Care Plan discussed with:    Comments   Patient x    Family      RN x    Care Manager     Consultant                        Multidiciplinary team rounds were held today with , nursing, pharmacist and clinical coordinator. Patient's plan of care was discussed; medications were reviewed and discharge planning was addressed. ________________________________________________________________________  Total NON critical care TIME:  35  Minutes    Total CRITICAL CARE TIME Spent:   Minutes non procedure based      Comments   >50% of visit spent in counseling and coordination of care x    ________________________________________________________________________  Reny Tomlin MD     Procedures: see electronic medical records for all procedures/Xrays and details which were not copied into this note but were reviewed prior to creation of Plan. LABS:  I reviewed today's most current labs and imaging studies.   Pertinent labs include:  Recent Labs     11/07/21 0226 11/05/21  1634   WBC 10.9 12.3*   HGB 6.6* 8.0*   HCT 19.9* 23.3*   * 216     Recent Labs     11/07/21 0226 11/06/21  0041 11/05/21  1634   * 128* 128*   K 4.0 5.3* 5.0   CL 96* 92* 92*   CO2 25 19* 19*   GLU 96 91 91   BUN 41* 91* 88*   CREA 8.99* 16.00* 15.60*   CA 8.3* 8.3* 7.7*   MG  --  2.4  --    ALB  --   --  2.9*   TBILI  --   --  0.5   ALT  --   --  8*   INR 2.1* 2.5* 2.7*       Signed: Reny Tomlin MD

## 2021-11-07 NOTE — PROGRESS NOTES
Arrived on shift and found pt to be warm to the touch. Took temperature orally and found to be 103.0. Tylenol given and ice packs applied to armpits and forehead for 15 minute intervals for one hour. PT's most recent temperature reading is 99.9, showing improvement.

## 2021-11-07 NOTE — PROGRESS NOTES
Reason for Admission:   Fever                    RUR Score:     16% Moderate             PCP: First and Last name:   Portia Howell MD     Name of Practice:    Are you a current patient: Yes/No:    Approximate date of last visit: September 2021   Can you participate in a virtual visit if needed:     Do you (patient/family) have any concerns for transition/discharge? Patient voiced no concerns regarding discharge. Plan for utilizing home health:   Prior [facility] in the past.     Current Advanced Directive/Advance Care Plan:  Full Code      Healthcare Decision Maker:     Inga Coreas, @ [phone] is the primary decision maker. Click here to complete [address] including selection of the Healthcare Decision Maker Relationship (ie \"Primary\")              Transition of Care Plan:            Patient lives w/his sister Tiff Ruiz in a two story home. Home has stairs to enter and on the inside of the home. Utilizes [facility] ([place]). Independent w/all ADL's & IADL's and requires no assistance at this present time. Patient drives and sister also provides transportation when needed. No home O2 or DME. Patient does PD x5 days a week at home. [facility] ([place]). Prior HH in the past.  No prior SNF or IRF (inpatient rehab). Will continue to monitor and follow re: disposition. Requires Medicare 2nd IMM prior to discharge. Care Management Interventions  PCP Verified by CM:  (Sep 2021)  Mode of Transport at Discharge: Other (see comment) (Family)  Transition of Care Consult (CM Consult): Discharge Planning  Discharge Durable Medical Equipment: No (No home O2.   No DME)  Support Systems: Other Family Member(s)  Confirm Follow Up Transport: Family  Discharge Location  Discharge Placement: [address], [place]

## 2021-11-07 NOTE — PROGRESS NOTES
Pharmacist Daily Dosing of Warfarin    Indication & Goal INR: AFib, INR Goal 2-3   has pacemaker, Hx DVT and graft thromboses, Hx HIT    PTA Warfarin Dose: 2.5 mg daily    Notable concurrent conditions and medications:     Labs:  Recent Labs     11/07/21  0226 11/06/21  0041 11/05/21  1634 11/05/21  1634   INR 2.1* 2.5*  --  2.7*   HGB 6.6*  --    < > 8.0*   *  --   --  216   TBILI  --   --   --  0.5   ALB  --   --   --  2.9*    < > = values in this interval not displayed. Impression/Plan:   HD PTA  Warfarin 3mg 11/7 then likely can return to 2.5mg daily per PTA  Daily INR ordered  CBC w/o differential every other day has been ordered     Pharmacy will follow daily and adjust the dose as appropriate.     Thank you,  Osbaldo Velez, [facility]

## 2021-11-07 NOTE — DIALYSIS
Hemodialysis / [phone]    Vitals Pre Post Assessment Pre Post   BP BP: 106/66 (11/07/21 1247)  LOC AOX3 AOX3   HR Pulse (Heart Rate): 89 (11/07/21 1247)  Lungs CTA CTA   Resp Resp Rate: 16 (11/07/21 1247)  Cardiac irregular irregular   Temp Temp: 98.4 °F (36.9 °C) (11/07/21 1247)  Skin WARM DRY INTACT WARM DRY INTACT   Weight    Edema none none   Tele status   Pain Pain Intensity 1: 7 (11/07/21 1234)      Orders   Duration: Start: Hemodialysis Start Time: 1346 (11/07/21 1204) End: 1410 Total: 2   Dialyzer: Dialyzer/Set Up Inspection: Una Veloz (11/07/21 1204)   K Bath: Dialysate K (mEq/L): 2 (11/07/21 1204)   Ca Bath: Dialysate CA (mEq/L): 2.5 (11/07/21 1204)   Na: Dialysate NA (mEq/L): 138 (11/07/21 1204)   Bicarb: Dialysate HCO3 (mEq/L): 35 (11/07/21 1204)   Target Fluid Removal: Goal/Amount of Fluid to Remove (mL): 2000 mL (11/07/21 1204)     Access   Type & Location: SUZANNE GRAFT- +B/T, skin intact, no s/s of infection, skin prepper per p/p, accessed with 2x15g  Needles with out incident. Comments:                                        Labs   HBsAg (Antigen) / date: Hepatitis B surface Ag   Date Value Ref Range Status   11/06/2021 0.40 Index Final                                       HBsAb (Antibody) / date: Hep B surface Ab Interp.    Date Value Ref Range Status   11/06/2021 REACTIVE (A) NR   Final      Source:    Obtained/Reviewed  Critical Results Called HGB   Date Value Ref Range Status   11/07/2021 6.6 (L) 12.1 - 17.0 g/dL Final     Potassium   Date Value Ref Range Status   11/07/2021 4.0 3.5 - 5.1 mmol/L Final     Comment:     INVESTIGATED PER DELTA CHECK PROTOCOL     Calcium   Date Value Ref Range Status   11/07/2021 8.3 (L) 8.5 - 10.1 MG/DL Final     BUN   Date Value Ref Range Status   11/07/2021 41 (H) 6 - 20 MG/DL Final     Comment:     INVESTIGATED PER DELTA CHECK PROTOCOL     Creatinine   Date Value Ref Range Status   11/07/2021 8.99 (H) 0.70 - 1.30 MG/DL Final     Comment:     INVESTIGATED PER DELTA CHECK PROTOCOL        Meds Given   Name Dose Route   1 unit prbc                  Adequacy / Fluid    Total Liters Process: 0 UF only    Net Fluid Removed: 2200ML      Comments   Time Out Done:   (Time) 1155   Admitting Diagnosis: esrd   Consent obtained/signed: Informed Consent Verified: Yes (11/07/21 7160)   Machine / Najma November # Machine Number: [id number] (11/07/21 5602)   Primary Nurse Rpt Pre: Louisa BILLINGS   Primary Nurse Rpt Post: Louisa RN   Pt Education: procedural   Care Plan: Cont current hd poc    Pts outpatient clinic:      Tx Summary Tx progressed without incident. Patient tolerated well. All blood rinsed back to patient. Pt transferred via transport back to room in no new acute distress, denying complaints, with stable vss, bed in lowest position with side rails upx3.      Comments:

## 2021-11-08 LAB
ANION GAP SERPL CALC-SCNC: 13 MMOL/L (ref 5–15)
BASOPHILS # BLD: 0 K/UL (ref 0–0.1)
BASOPHILS NFR BLD: 0 % (ref 0–1)
BUN SERPL-MCNC: 56 MG/DL (ref 6–20)
BUN/CREAT SERPL: 5 (ref 12–20)
CALCIUM SERPL-MCNC: 9.4 MG/DL (ref 8.5–10.1)
CHLORIDE SERPL-SCNC: 94 MMOL/L (ref 97–108)
CO2 SERPL-SCNC: 24 MMOL/L (ref 21–32)
CREAT SERPL-MCNC: 11.7 MG/DL (ref 0.7–1.3)
DIFFERENTIAL METHOD BLD: ABNORMAL
EOSINOPHIL # BLD: 0.6 K/UL (ref 0–0.4)
EOSINOPHIL NFR BLD: 4 % (ref 0–7)
ERYTHROCYTE [DISTWIDTH] IN BLOOD BY AUTOMATED COUNT: 18.5 % (ref 11.5–14.5)
GLUCOSE SERPL-MCNC: 96 MG/DL (ref 65–100)
HCT VFR BLD AUTO: 23.6 % (ref 36.6–50.3)
HGB BLD-MCNC: 8 G/DL (ref 12.1–17)
IMM GRANULOCYTES # BLD AUTO: 0 K/UL (ref 0–0.04)
IMM GRANULOCYTES NFR BLD AUTO: 0 % (ref 0–0.5)
INR PPP: 2 (ref 0.9–1.1)
LYMPHOCYTES # BLD: 0.6 K/UL (ref 0.8–3.5)
LYMPHOCYTES NFR BLD: 4 % (ref 12–49)
MCH RBC QN AUTO: 30.8 PG (ref 26–34)
MCHC RBC AUTO-ENTMCNC: 33.9 G/DL (ref 30–36.5)
MCV RBC AUTO: 90.8 FL (ref 80–99)
MONOCYTES # BLD: 0.7 K/UL (ref 0–1)
MONOCYTES NFR BLD: 5 % (ref 5–13)
NEUTS SEG # BLD: 11.9 K/UL (ref 1.8–8)
NEUTS SEG NFR BLD: 87 % (ref 32–75)
NRBC # BLD: 0 K/UL (ref 0–0.01)
NRBC BLD-RTO: 0 PER 100 WBC
PLATELET # BLD AUTO: 166 K/UL (ref 150–400)
PMV BLD AUTO: 11.3 FL (ref 8.9–12.9)
POTASSIUM SERPL-SCNC: 4.2 MMOL/L (ref 3.5–5.1)
PROTHROMBIN TIME: 20.4 SEC (ref 9–11.1)
RBC # BLD AUTO: 2.6 M/UL (ref 4.1–5.7)
RBC MORPH BLD: ABNORMAL
RBC MORPH BLD: ABNORMAL
SODIUM SERPL-SCNC: 131 MMOL/L (ref 136–145)
WBC # BLD AUTO: 13.8 K/UL (ref 4.1–11.1)

## 2021-11-08 PROCEDURE — 74011250636 HC RX REV CODE- 250/636: Performed by: STUDENT IN AN ORGANIZED HEALTH CARE EDUCATION/TRAINING PROGRAM

## 2021-11-08 PROCEDURE — 65270000029 HC RM PRIVATE

## 2021-11-08 PROCEDURE — 85025 COMPLETE CBC W/AUTO DIFF WBC: CPT

## 2021-11-08 PROCEDURE — 80048 BASIC METABOLIC PNL TOTAL CA: CPT

## 2021-11-08 PROCEDURE — 85610 PROTHROMBIN TIME: CPT

## 2021-11-08 PROCEDURE — 74011250637 HC RX REV CODE- 250/637: Performed by: STUDENT IN AN ORGANIZED HEALTH CARE EDUCATION/TRAINING PROGRAM

## 2021-11-08 PROCEDURE — 36415 COLL VENOUS BLD VENIPUNCTURE: CPT

## 2021-11-08 PROCEDURE — 74011250637 HC RX REV CODE- 250/637: Performed by: INTERNAL MEDICINE

## 2021-11-08 PROCEDURE — 99221 1ST HOSP IP/OBS SF/LOW 40: CPT | Performed by: STUDENT IN AN ORGANIZED HEALTH CARE EDUCATION/TRAINING PROGRAM

## 2021-11-08 PROCEDURE — 74011000258 HC RX REV CODE- 258: Performed by: STUDENT IN AN ORGANIZED HEALTH CARE EDUCATION/TRAINING PROGRAM

## 2021-11-08 RX ORDER — WARFARIN 1 MG/1
3 TABLET ORAL
Status: COMPLETED | OUTPATIENT
Start: 2021-11-08 | End: 2021-11-08

## 2021-11-08 RX ADMIN — Medication 10 ML: at 17:04

## 2021-11-08 RX ADMIN — HYDROMORPHONE HYDROCHLORIDE 1 MG: 1 INJECTION, SOLUTION INTRAMUSCULAR; INTRAVENOUS; SUBCUTANEOUS at 01:35

## 2021-11-08 RX ADMIN — ASPIRIN 81 MG: 81 TABLET, CHEWABLE ORAL at 10:16

## 2021-11-08 RX ADMIN — ATORVASTATIN CALCIUM 20 MG: 20 TABLET, FILM COATED ORAL at 10:17

## 2021-11-08 RX ADMIN — WARFARIN SODIUM 3 MG: 1 TABLET ORAL at 21:37

## 2021-11-08 RX ADMIN — HYDROMORPHONE HYDROCHLORIDE 1 MG: 1 INJECTION, SOLUTION INTRAMUSCULAR; INTRAVENOUS; SUBCUTANEOUS at 11:03

## 2021-11-08 RX ADMIN — CALCIUM ACETATE 1334 MG: 667 CAPSULE ORAL at 11:03

## 2021-11-08 RX ADMIN — HYDROMORPHONE HYDROCHLORIDE 1 MG: 1 INJECTION, SOLUTION INTRAMUSCULAR; INTRAVENOUS; SUBCUTANEOUS at 21:01

## 2021-11-08 RX ADMIN — PANTOPRAZOLE SODIUM 40 MG: 40 TABLET, DELAYED RELEASE ORAL at 10:17

## 2021-11-08 RX ADMIN — CALCIUM ACETATE 1334 MG: 667 CAPSULE ORAL at 17:02

## 2021-11-08 RX ADMIN — Medication 10 ML: at 21:38

## 2021-11-08 RX ADMIN — HYDROMORPHONE HYDROCHLORIDE 1 MG: 1 INJECTION, SOLUTION INTRAMUSCULAR; INTRAVENOUS; SUBCUTANEOUS at 06:26

## 2021-11-08 RX ADMIN — METOPROLOL SUCCINATE 12.5 MG: 25 TABLET, EXTENDED RELEASE ORAL at 10:18

## 2021-11-08 RX ADMIN — CALCIUM ACETATE 1334 MG: 667 CAPSULE ORAL at 10:16

## 2021-11-08 RX ADMIN — CALCITRIOL CAPSULES 0.25 MCG 0.5 MCG: 0.25 CAPSULE ORAL at 10:17

## 2021-11-08 RX ADMIN — Medication 10 ML: at 06:32

## 2021-11-08 RX ADMIN — PIPERACILLIN AND TAZOBACTAM 3.38 G: 3; .375 INJECTION, POWDER, LYOPHILIZED, FOR SOLUTION INTRAVENOUS at 17:02

## 2021-11-08 RX ADMIN — HYDROMORPHONE HYDROCHLORIDE 1 MG: 1 INJECTION, SOLUTION INTRAMUSCULAR; INTRAVENOUS; SUBCUTANEOUS at 17:02

## 2021-11-08 RX ADMIN — PIPERACILLIN AND TAZOBACTAM 3.38 G: 3; .375 INJECTION, POWDER, LYOPHILIZED, FOR SOLUTION INTRAVENOUS at 04:37

## 2021-11-08 RX ADMIN — OXYCODONE 5 MG: 5 TABLET ORAL at 04:19

## 2021-11-08 NOTE — CONSULTS
Infectious Disease Consult Note    Reason for Consult: CoNS bacteremia   Date of Consultation: November 8, 2021  Date of Admission: 11/5/2021  Referring Physician: Dr. Carolina Castellon       HPI:  Carolyn Rae is a 42y.o. 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  Patient has the nonendovascular ICD in his left chest from before. This was not removed at [facility] because the suspicion for involvement of this ICD from the infection was low. Also has hx of H/o DVT, s/p ivc filter/ h/o HIT. Patient presented this time to the hospital on 11/5/2021 for malaise, chills, low-grade fever of 100 F, body aches vomiting and anorexia at home which he reports started Tuesday of last week. He reports that he was otherwise feeling well since the ICD was removed in May 2021. He reports all of the symptoms are very much what he feels whenever he gets bloodstream infections. On arrival patient was afebrile. WBC 12.3. On 11/6/2021 he spiked fevers as high as 103F. Blood cultures from 11/5/2021 are growing coagulase-negative staph species in 1/4 bottles from the periphery. Patient was started empirically on vancomycin and Zosyn on 11/6/2021. TTE shows no vegetations. Patient is on home dialysis 5 times a week via left arm AV graft since several years. He denies any chills or sickness while getting dialyzed. However he does report swelling of the AV graft in the past few days. But he denies any pain at the graft.   Chest x-ray showed no acute findings, CT abdomen pelvis showed no acute findings. On exam today, patient appears tired. He reports he is feeling slightly better since coming. Denies any other hospital visits or surgeries since May 2021. Patient lives with his sister. He reports he did not receive his Covid vaccine yet because at the time when his dialysis center was administering antibiotics, he was admitted to the hospital.  He then never sought to get the vaccine elsewhere. Past Medical History:  Past Medical History:   Diagnosis Date    Abdominal hematoma     AICD (automatic cardioverter/defibrillator) present     CAD (coronary artery disease)     anterior MI s/p 2 stents  2007    Chronic abdominal pain     Chronic kidney disease     ESRD; Dialysis dependent.  Home [facility] does labs- [facility] tpke    DVT (deep venous thrombosis) ([facility] 2001    DVT of popliteal vein ([address] 11/2011    not anticoagulated due to bleeding, IVC filter    Endocarditis     Gallstone pancreatitis     Gastrointestinal disorder     acid reflux    Gastrointestinal disorder     peptic ulcer    Hemodialysis patient ([address]     High cholesterol     Hypertension     Kidney transplant     b/l kidneys 1995, 2001    Long term current use of anticoagulant therapy     Nephrotic syndrome     Other ill-defined conditions(799.89)     kidny transplant x2,  on dialysis    Other ill-defined conditions(799.89)     home dialysis    Other ill-defined conditions(799.89)     hx recurrent left leg DVT    Peritonitis ([facility]     Seizures ([address] 2015    most recent- only had three in lifetime    Small bowel obstruction (HCC)     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    V-[facility])          Surgical History:  Past Surgical History:   Procedure Laterality Date    HX APPENDECTOMY      HX CHOLECYSTECTOMY      HX OTHER SURGICAL      cholecystectomy    HX OTHER SURGICAL  11/2011    exploratory laparotomy    HX OTHER SURGICAL      fem-pop    HX OTHER SURGICAL  02/2013 right upper extremity fistula    HX PACEMAKER Left 6/2009    AICD-st latonya-not connected    HX PACEMAKER Left     AICD-left side-BS-working    HX SMALL BOWEL RESECTION      HX TRANSPLANT  2001     Kidney    HX TRANSPLANT  1992    kidney    HX UROLOGICAL      2 kidney transplants    HX VASCULAR ACCESS Right     femoral access for HD- does 5 day dialysis @ home    IR REMOVE TUNL CVAD W/O PORT / PUMP  10/29/2020    IR REPLACE CVC TUNNELED W/O PORT  9/10/2020    SC CARDIAC SURG PROCEDURE UNLIST  2007    2 stents    SC EDG US EXAM SURGICAL ALTER STOM DUODENUM/JEJUNUM  7/15/2011         SC ESOPHAGOGASTRODUODENOSCOPY TRANSORAL DIAGNOSTIC  5/24/2012         VASCULAR SURGERY PROCEDURE UNLIST  11/14/2017    Insertion AV graft right upper arm (bovine); ligation of AV fistula right arm         Family History:   Family History   Problem Relation Age of Onset    COPD Mother     Lung Disease Mother     Cancer Father         stomach    Cancer Maternal Grandmother          Social History:     See HPI      Allergies: Allergies   Allergen Reactions    Shellfish Containing Products Swelling     Break out in rash, trouble breathing    Contrast Agent [Iodine] Shortness of Breath    Heparin Analogues Other (comments)     Positive history of HIT         Review of Systems:     Negative except as in HPI    Medications:  No current facility-administered medications on file prior to encounter. Current Outpatient Medications on File Prior to Encounter   Medication Sig Dispense Refill    ondansetron (Zofran ODT) 4 mg disintegrating tablet 1 Tab by SubLINGual route every eight (8) hours as needed for Nausea or Vomiting. 20 Tab 0    metoprolol succinate (TOPROL-XL) 25 mg XL tablet Take 0.5 Tabs by mouth daily. 30 Tab 0    calcium acetate,phosphat bind, (PHOSLO) 667 mg cap Take 2 Caps by mouth three (3) times daily (with meals).  Indications: low amount of calcium in the blood, renal osteodystrophy with hyperphosphatemia 120 Cap 0    atorvastatin (Lipitor) 20 mg tablet Take 20 mg by mouth daily.  warfarin (COUMADIN) 2.5 mg tablet Take 2.5 mg by mouth daily.  acetaminophen (TYLENOL) 500 mg tablet Take 1 Tab by mouth every four (4) hours as needed for Pain. Over the counter 30 Tab 0    omeprazole (PRILOSEC) 20 mg capsule Take 20 mg by mouth daily.  calcitRIOL (ROCALTROL) 0.5 mcg capsule Take 0.5 mcg by mouth daily.  aspirin 81 mg chewable tablet Take 81 mg by mouth daily. Physical Exam:    Vitals:   Patient Vitals for the past 24 hrs:   Temp Pulse Resp BP SpO2   11/08/21 1241 100.4 °F (38 °C) 98 16 108/70 96 %   11/08/21 0831 98.9 °F (37.2 °C) 100 20 109/66 100 %   11/08/21 0424 98.6 °F (37 °C) 100 22 120/77 98 %   11/07/21 2325 99 °F (37.2 °C) (!) 101 22 115/75 98 %   11/07/21 1915 99.3 °F (37.4 °C) (!) 102 22 121/74 99 %   11/07/21 1704    103/75    11/07/21 1515    103/75    11/07/21 1456 99.3 °F (37.4 °C) 86 16 103/75 94 %   11/07/21 1402 98.4 °F (36.9 °C) 89 16 102/66    11/07/21 1400  91 16 93/65    ·   · GEN: NAD, tired-appearing. · HEENT: Normocephalic, atraumatic, PERRL, no scleral icterus  · CV: S1, S2 heard regularly, thrills from AVG heard, left chest ICD no inflammation or tenderness  · Lungs: Clear to auscultation bilaterally  · Abdomen: soft, non distended, non tender  · Genitourinary:  no oneal  · Extremities: no edema  · Neuro: Alert, oriented to time, place and situation, moves all extremities to commands, verbal   · Skin: no rash  · Psych: good affect, good eye contact, non tearful   · Lines: left arm AVG is swollen and warm. Non-tender. Has a good thrill.        Labs:   Recent Results (from the past 24 hour(s))   ECHO ADULT COMPLETE    Collection Time: 11/07/21  3:40 PM   Result Value Ref Range    IVSd 0.78 0.6 - 1.0 cm    LVIDd 6.11 (A) 4.2 - 5.9 cm    LVIDs 4.86 cm    LVOT d 2.28 cm    LVPWd 1.15 (A) 0.6 - 1.0 cm    LVOT Peak Gradient 2.51 mmHg    Left Ventricular Outflow Tract Mean Gradient 1.50 mmHg    LVOT SV 54.3 mL    LVOT Peak Velocity 79.21 cm/s    LVOT VTI 13.32 cm    Left Atrium Major Axis 4.49 cm    Aortic Valve Area by Continuity of Peak Velocity 2.04 cm2    Aortic Valve Area by Continuity of VTI 1.88 cm2    AoV PG 10.01 mmHg    Aortic Valve Systolic Mean Gradient 3.95 mmHg    Aortic Valve Systolic Peak Velocity 257.62 cm/s    AoV VTI 28.83 cm    MV A Scott 58.79 cm/s    Mitral Valve E Wave Deceleration Time 148.99 ms    MV E Scott 88.63 cm/s    E/E' ratio (averaged) 19.20     E/E' lateral 14.39     E/E' septal 24.02     LV E' Lateral Velocity 6.16 cm/s    LV E' Septal Velocity 3.69 cm/s    MVA VTI 2.42 cm2    MV Peak Gradient 3.81 mmHg    MV Mean Gradient 1.74 mmHg    Mitral Valve Max Velocity 97.56 cm/s    Mitral Valve Annulus Velocity Time Integral 22.38 cm    Pulmonic Regurgitant End Max Velocity 122.63 cm/s    Pulmonic Valve Systolic Peak Instantaneous Gradient 6.31 mmHg    Pulmonic Valve Max Velocity 125.59 cm/s    Triscuspid Valve Regurgitation Peak Gradient 60.12 mmHg    TR Max Velocity 383.63 cm/s    AO ASC D 3.18 cm    IVC proximal 1.79 cm    MV E/A 1.51     LV Mass .2 88 - 224 g    LV Mass AL Index 135.1 49 - 115 g/m2    Left Atrium Minor Axis 2.49 cm    MICHAEL/BSA Pk Scott 1.1 cm2/m2    MICHAEL/BSA VTI 1.0 cm2/m2   HGB & HCT    Collection Time: 11/07/21 10:27 PM   Result Value Ref Range    HGB 7.6 (L) 12.1 - 17.0 g/dL    HCT 22.7 (L) 36.6 - 50.3 %       Microbiology Data:       In HPI          Assessment:   42y.o. 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 on 5/22/21 followed by ~4.5 weeks of vancomycin from ICD removal.  Patient has the nonendovascular ICD in his left chest from before. This was not removed at [address] [place] [address] because the suspicion for involvement of this ICD from the infection was low. Also has hx of H/o DVT, s/p ivc filter/ h/o HIT. This admission:  - Sepsis from CoNS bacteremia. Symptoms ongoing for ~6 days PTA. Per exam, suspicion is high for AVG involvement. Nephrology has ordered WBC tagged study. Case discussed with Dr. Sera Marin today. Left chest ICD is non-endovascular. Hence, not at high risk for infectious involvement from bloodstream infections. Recommendations:  - Per exam, suspicion is high for AVG involvement. Nephrology has ordered WBC tagged study. Case discussed with Dr. Sera Marin today. - F/u repeat blood cultures. - Any fever >101F, must please obtain repeat blood cultures. - Left chest ICD is entirely non-endovascular. Hence, not at high risk for infectious involvement from bloodstream infections. Can continue to monitor for now. - TTE negative, if more BCx positive, low threshold to proceed to EVER.   - Continue empiric vancomycin and zosyn for now. - Patient is not vaccinated against Covid-19. He reports he did not receive his Covid vaccine yet because at the time when his dialysis center was administering antibiotics, he was admitted to the hospital.  He then never sought to get the vaccine elsewhere. He expressed interest in getting this once cleared to. Thank for the opportunity to participate in the care of this patient. Please contact with questions or concerns.            Henrik Avitia MD  Infectious Diseases

## 2021-11-08 NOTE — PROGRESS NOTES
Hospitalist Progress Note    NAME: Te Busby   :  1975   MRN:  [id number]       Assessment / Plan:  Gram-positive cocci bacteremia  Sepsis  History of AV graft  --Presented with generalized body ache, nausea, vomiting  Sepsis indicatorstachycardia, tachypnea, fever. CT abdomen/pelvisno acute abnormality. Chest x-rayNo focal airspace process. Mild cardiomegaly with probable pulmonary  arterial enlargement. Has a similar presentation in 2021 when the patient has persistent bacteremia and got ICD removed. --Continues to have fever of 100.3  -Follow final blood culture results. Repeat blood cultures negative so far  -Continue empiric vancomycin and Zosyn  -Since he does have AV graft, he is at high risk of having a graft infection. Will consult infectious disease. -2D echo shows mild mitral valve thickening. He may need a EVER. Will defer to ID.     End-stage renal disease on dialysis  Hyperkalemia, hyponatremia, and met acidosis secondary to above  Renal following for hemodialysis needs. Discussed with renal today  Continue with the PhosLo and calcitriol.     Acute on chronic anemia  -S/p 1 unit of PRBC. Hemoglobin is 7.6    History of DVT with graft thrombosis  -Continue Coumadin. Pharmacy managing warfarin. Check INR in a.m. 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 Estimated discharge date:   Barriers: Clinical improvement, treatment of bacteremia      Code Status: Full code  Surrogate Decision Maker:     DVT Prophylaxis: Coumadin  GI Prophylaxis: not indicated     Baseline: Ambulatory at home     Subjective:     Chief Complaint / Reason for Physician Visit  Does not report any abdominal pain. Reports mild nausea. No vomiting.   Had fever of 100.4        Review of Systems:  Symptom Y/N Comments  Symptom Y/N Comments   Fever/Chills    Chest Pain     Poor Appetite    Edema     Cough    Abdominal Pain     Sputum    Joint Pain     SOB/ZAMUDIO    Pruritis/Rash     Nausea/vomit    Tolerating PT/OT     Diarrhea    Tolerating Diet     Constipation    Other       Could NOT obtain due to:      Objective:     VITALS:   Last 24hrs VS reviewed since prior progress note. Most recent are:  Patient Vitals for the past 24 hrs:   Temp Pulse Resp BP SpO2   11/08/21 1241 100.4 °F (38 °C) 98 16 108/70 96 %   11/08/21 0831 98.9 °F (37.2 °C) 100 20 109/66 100 %   11/08/21 0424 98.6 °F (37 °C) 100 22 120/77 98 %   11/07/21 2325 99 °F (37.2 °C) (!) 101 22 115/75 98 %   11/07/21 1915 99.3 °F (37.4 °C) (!) 102 22 121/74 99 %   11/07/21 1704    103/75    11/07/21 1515    103/75    11/07/21 1456 99.3 °F (37.4 °C) 86 16 103/75 94 %   11/07/21 1402 98.4 °F (36.9 °C) 89 16 102/66    11/07/21 1400  91 16 93/65    11/07/21 1345  88 16 106/71    11/07/21 1333 98.4 °F (36.9 °C) 88 16 102/70    11/07/21 1315  89 16 106/66        Intake/Output Summary (Last 24 hours) at 11/8/2021 1305  Last data filed at 11/7/2021 1803  Gross per 24 hour   Intake 1322 ml   Output 2200 ml   Net -878 ml        I had a face to face encounter and independently examined this patient on 11/8/2021, as outlined below:  PHYSICAL EXAM:  General: Alert, cooperative, no acute distress    EENT:  EOMI. Anicteric sclerae. MMM  Resp:  CTA bilaterally, no wheezing or rales. No accessory muscle use  CV:  Regular  rhythm,  +3 bilateral LE edema, ICD scar without evidence of infection, LUE AV graft  GI:  Soft, Non distended, Non tender. +Bowel sounds  Neurologic:  Alert and oriented X 3, normal speech,   Psych:   Good insight. Not anxious nor agitated  Skin:  No rashes.   No jaundice    Reviewed most current lab test results and cultures  YES  Reviewed most current radiology test results   YES  Review and summation of old records today    NO  Reviewed patient's current orders and MAR    YES  PMH/SH reviewed - no change compared to H&P  ________________________________________________________________________  Care Plan discussed with:    Comments   Patient x    Family      RN x    Care Manager     Consultant                        Multidiciplinary team rounds were held today with , nursing, pharmacist and clinical coordinator. Patient's plan of care was discussed; medications were reviewed and discharge planning was addressed. ________________________________________________________________________  Total NON critical care TIME:  35  Minutes    Total CRITICAL CARE TIME Spent:   Minutes non procedure based      Comments   >50% of visit spent in counseling and coordination of care x    ________________________________________________________________________  Laurel Goodpasture, MD     Procedures: see electronic medical records for all procedures/Xrays and details which were not copied into this note but were reviewed prior to creation of Plan. LABS:  I reviewed today's most current labs and imaging studies.   Pertinent labs include:  Recent Labs     11/07/21 2227 11/07/21 0226 11/05/21  1634   WBC  --  10.9 12.3*   HGB 7.6* 6.6* 8.0*   HCT 22.7* 19.9* 23.3*   PLT  --  147* 216     Recent Labs     11/07/21 0226 11/06/21  0041 11/05/21  1634   * 128* 128*   K 4.0 5.3* 5.0   CL 96* 92* 92*   CO2 25 19* 19*   GLU 96 91 91   BUN 41* 91* 88*   CREA 8.99* 16.00* 15.60*   CA 8.3* 8.3* 7.7*   MG  --  2.4  --    ALB  --   --  2.9*   TBILI  --   --  0.5   ALT  --   --  8*   INR 2.1* 2.5* 2.7*       Signed: Laurel Goodpasture, MD

## 2021-11-08 NOTE — PROGRESS NOTES
Nephrology Progress Note  Blaise Leblanc     [email or web address]  Phone - [phone]   Patient: Everardo Hunt    YOB: 1975        Date- 11/8/2021   Admit Date: 11/5/2021  CC: Follow up for  ESRD          IMPRESSION & PLAN:   · End-stage renal disease home HD 5 days per week- Follows up [facility] Dr [facility]a [facility]  · Failed RTX times 2  · Hyperkalemia  · Gram positive sepsis with lead extraction at [facility]  · Anemia of ckd  · Hx of renal tx in past times 2.  · Hypertension  · CAD         H/o DVT, s/p ivc filter/ h/o HIT     PLAN-   Got HD yesterday   Will plan for HD tomorrow and will keep him on TTS schedule for now   Ordered for tagged WBC scan to rule out AVG infection   Antibiotics per IM but he will benefit from ID consult.  Will follow with you. Subjective: Interval History:   -Seen and examined today  -Got HD yesterday  -1 of 2 blood cultures positive with gram-positive cocci  -On empiric antibiotics    Objective:   Vitals:    11/07/21 2137 11/07/21 2325 11/08/21 0424 11/08/21 0831   BP:  115/75 120/77 109/66   Pulse:  (!) 101 100 100   Resp:  22 22 20   Temp:  99 °F (37.2 °C) 98.6 °F (37 °C) 98.9 °F (37.2 °C)   TempSrc:       SpO2:  98% 98% 100%   Weight: 64.8 kg (142 lb 12.8 oz)      Height:          11/07 0701 - 11/08 0700  In: 1322 [P.O.:1200]  Out: 2200   Last 3 Recorded Weights in this Encounter    11/07/21 0058 11/07/21 1515 11/07/21 2137   Weight: 69.2 kg (152 lb 9.6 oz) 68.9 kg (152 lb) 64.8 kg (142 lb 12.8 oz)      Physical exam:   GEN: NAD  NECK- Supple, no mass  RESP: No wheezing, Clear b/l  CVS: S1,S2  RRR  NEURO: Normal speech, Non focal  EXT: No Edema   PSYCH: Normal Mood  HD acess; R AVG    Chart reviewed. Pertinent Notes reviewed.      Data Review :  Recent Labs     11/07/21 0226 11/06/21  0041 11/05/21  1634   * 128* 128*   K 4.0 5.3* 5.0   CL 96* 92* 92*   CO2 25 19* 19*   BUN 41* 91* 88*   CREA 8.99* 16.00* 15.60*   GLU 96 91 91   CA 8.3* 8.3* 7.7*   MG  --  2.4  --      Recent Labs     11/07/21  2227 11/07/21  0226 11/05/21  1634   WBC  --  10.9 12.3*   HGB 7.6* 6.6* 8.0*   HCT 22.7* 19.9* 23.3*   PLT  --  147* 216     No results for input(s): FE, TIBC, PSAT, FERR in the last 72 hours.    Medication list  reviewed  Current Facility-Administered Medications   Medication Dose Route Frequency    0.9% sodium chloride infusion 250 mL  250 mL IntraVENous PRN    oxyCODONE IR (ROXICODONE) tablet 5 mg  5 mg Oral Q4H PRN    HYDROmorphone (DILAUDID) injection 1 mg  1 mg IntraVENous Q4H PRN    WARFARIN INFORMATION NOTE (COUMADIN)   Other QPM    aspirin chewable tablet 81 mg  81 mg Oral DAILY    calcitRIOL (ROCALTROL) capsule 0.5 mcg  0.5 mcg Oral DAILY    atorvastatin (LIPITOR) tablet 20 mg  20 mg Oral DAILY    calcium acetate(phosphat bind) (PHOSLO) capsule 1,334 mg  2 Capsule Oral TID WITH MEALS    metoprolol succinate (TOPROL-XL) XL tablet 12.5 mg  12.5 mg Oral DAILY    pantoprazole (PROTONIX) tablet 40 mg  40 mg Oral ACB    piperacillin-tazobactam (ZOSYN) 3.375 g in 0.9% sodium chloride (MBP/ADV) 100 mL MBP  3.375 g IntraVENous Q12H    VANCOMYCIN INFORMATION NOTE   Other Rx Dosing/Monitoring    sodium chloride (NS) flush 5-40 mL  5-40 mL IntraVENous Q8H    sodium chloride (NS) flush 5-40 mL  5-40 mL IntraVENous PRN    acetaminophen (TYLENOL) tablet 650 mg  650 mg Oral Q6H PRN    Or    acetaminophen (TYLENOL) suppository 650 mg  650 mg Rectal Q6H PRN    polyethylene glycol (MIRALAX) packet 17 g  17 g Oral DAILY PRN    ondansetron (ZOFRAN ODT) tablet 4 mg  4 mg Oral Q8H PRN    Or    ondansetron (ZOFRAN) injection 4 mg  4 mg IntraVENous Q6H PRN          Josue Owusu MD              [facility]  Formerly [facility], [address]  [place], [address]  Phone - [phone]               Fax - [phone]

## 2021-11-09 ENCOUNTER — APPOINTMENT (OUTPATIENT)
Dept: NUCLEAR MEDICINE | Age: 46
DRG: 853 | End: 2021-11-09
Attending: INTERNAL MEDICINE
Payer: MEDICARE

## 2021-11-09 LAB
ABO + RH BLD: NORMAL
ALBUMIN SERPL-MCNC: 2.2 G/DL (ref 3.5–5)
ANION GAP SERPL CALC-SCNC: 15 MMOL/L (ref 5–15)
BLD PROD TYP BPU: NORMAL
BLOOD GROUP ANTIBODIES SERPL: NORMAL
BPU ID: NORMAL
BUN SERPL-MCNC: 61 MG/DL (ref 6–20)
BUN/CREAT SERPL: 5 (ref 12–20)
CALCIUM SERPL-MCNC: 8.6 MG/DL (ref 8.5–10.1)
CHLORIDE SERPL-SCNC: 96 MMOL/L (ref 97–108)
CO2 SERPL-SCNC: 21 MMOL/L (ref 21–32)
CREAT SERPL-MCNC: 13 MG/DL (ref 0.7–1.3)
CROSSMATCH RESULT,%XM: NORMAL
ERYTHROCYTE [DISTWIDTH] IN BLOOD BY AUTOMATED COUNT: 18.2 % (ref 11.5–14.5)
GLUCOSE SERPL-MCNC: 77 MG/DL (ref 65–100)
HCT VFR BLD AUTO: 21.7 % (ref 36.6–50.3)
HGB BLD-MCNC: 7.2 G/DL (ref 12.1–17)
INR PPP: 1.9 (ref 0.9–1.1)
MCH RBC QN AUTO: 30.4 PG (ref 26–34)
MCHC RBC AUTO-ENTMCNC: 33.2 G/DL (ref 30–36.5)
MCV RBC AUTO: 91.6 FL (ref 80–99)
NRBC # BLD: 0 K/UL (ref 0–0.01)
NRBC BLD-RTO: 0 PER 100 WBC
PHOSPHATE SERPL-MCNC: 5.7 MG/DL (ref 2.6–4.7)
PLATELET # BLD AUTO: 156 K/UL (ref 150–400)
PMV BLD AUTO: 11.2 FL (ref 8.9–12.9)
POTASSIUM SERPL-SCNC: 4.5 MMOL/L (ref 3.5–5.1)
PROTHROMBIN TIME: 19.7 SEC (ref 9–11.1)
RBC # BLD AUTO: 2.37 M/UL (ref 4.1–5.7)
SODIUM SERPL-SCNC: 132 MMOL/L (ref 136–145)
SPECIMEN EXP DATE BLD: NORMAL
STATUS OF UNIT,%ST: NORMAL
UNIT DIVISION, %UDIV: 0
VANCOMYCIN SERPL-MCNC: 36.6 UG/ML
WBC # BLD AUTO: 12.9 K/UL (ref 4.1–11.1)

## 2021-11-09 PROCEDURE — 74011250637 HC RX REV CODE- 250/637: Performed by: STUDENT IN AN ORGANIZED HEALTH CARE EDUCATION/TRAINING PROGRAM

## 2021-11-09 PROCEDURE — 74011250636 HC RX REV CODE- 250/636: Performed by: STUDENT IN AN ORGANIZED HEALTH CARE EDUCATION/TRAINING PROGRAM

## 2021-11-09 PROCEDURE — 99233 SBSQ HOSP IP/OBS HIGH 50: CPT | Performed by: STUDENT IN AN ORGANIZED HEALTH CARE EDUCATION/TRAINING PROGRAM

## 2021-11-09 PROCEDURE — 90935 HEMODIALYSIS ONE EVALUATION: CPT

## 2021-11-09 PROCEDURE — A9569 TECHNETIUM TC-99M AUTO WBC: HCPCS

## 2021-11-09 PROCEDURE — 85610 PROTHROMBIN TIME: CPT

## 2021-11-09 PROCEDURE — 80069 RENAL FUNCTION PANEL: CPT

## 2021-11-09 PROCEDURE — 74011250636 HC RX REV CODE- 250/636: Performed by: INTERNAL MEDICINE

## 2021-11-09 PROCEDURE — 80202 ASSAY OF VANCOMYCIN: CPT

## 2021-11-09 PROCEDURE — 74011000258 HC RX REV CODE- 258: Performed by: STUDENT IN AN ORGANIZED HEALTH CARE EDUCATION/TRAINING PROGRAM

## 2021-11-09 PROCEDURE — 85027 COMPLETE CBC AUTOMATED: CPT

## 2021-11-09 PROCEDURE — 36415 COLL VENOUS BLD VENIPUNCTURE: CPT

## 2021-11-09 PROCEDURE — 74011250637 HC RX REV CODE- 250/637: Performed by: INTERNAL MEDICINE

## 2021-11-09 PROCEDURE — 65270000029 HC RM PRIVATE

## 2021-11-09 RX ORDER — WARFARIN 1 MG/1
3 TABLET ORAL ONCE
Status: COMPLETED | OUTPATIENT
Start: 2021-11-09 | End: 2021-11-09

## 2021-11-09 RX ORDER — HEPARIN SODIUM 1000 [USP'U]/ML
2000 INJECTION, SOLUTION INTRAVENOUS; SUBCUTANEOUS ONCE
Status: COMPLETED | OUTPATIENT
Start: 2021-11-09 | End: 2021-11-09

## 2021-11-09 RX ADMIN — HYDROMORPHONE HYDROCHLORIDE 1 MG: 1 INJECTION, SOLUTION INTRAMUSCULAR; INTRAVENOUS; SUBCUTANEOUS at 23:20

## 2021-11-09 RX ADMIN — METOPROLOL SUCCINATE 12.5 MG: 25 TABLET, EXTENDED RELEASE ORAL at 12:33

## 2021-11-09 RX ADMIN — Medication 5 ML: at 23:20

## 2021-11-09 RX ADMIN — CALCIUM ACETATE 1334 MG: 667 CAPSULE ORAL at 16:35

## 2021-11-09 RX ADMIN — HYDROMORPHONE HYDROCHLORIDE 1 MG: 1 INJECTION, SOLUTION INTRAMUSCULAR; INTRAVENOUS; SUBCUTANEOUS at 05:05

## 2021-11-09 RX ADMIN — CALCITRIOL CAPSULES 0.25 MCG 0.5 MCG: 0.25 CAPSULE ORAL at 12:33

## 2021-11-09 RX ADMIN — OXYCODONE 5 MG: 5 TABLET ORAL at 12:33

## 2021-11-09 RX ADMIN — CALCIUM ACETATE 1334 MG: 667 CAPSULE ORAL at 12:34

## 2021-11-09 RX ADMIN — PIPERACILLIN AND TAZOBACTAM 3.38 G: 3; .375 INJECTION, POWDER, LYOPHILIZED, FOR SOLUTION INTRAVENOUS at 16:55

## 2021-11-09 RX ADMIN — PIPERACILLIN AND TAZOBACTAM 3.38 G: 3; .375 INJECTION, POWDER, LYOPHILIZED, FOR SOLUTION INTRAVENOUS at 05:04

## 2021-11-09 RX ADMIN — PANTOPRAZOLE SODIUM 40 MG: 40 TABLET, DELAYED RELEASE ORAL at 07:26

## 2021-11-09 RX ADMIN — HYDROMORPHONE HYDROCHLORIDE 1 MG: 1 INJECTION, SOLUTION INTRAMUSCULAR; INTRAVENOUS; SUBCUTANEOUS at 00:57

## 2021-11-09 RX ADMIN — Medication 10 ML: at 14:00

## 2021-11-09 RX ADMIN — HYDROMORPHONE HYDROCHLORIDE 1 MG: 1 INJECTION, SOLUTION INTRAMUSCULAR; INTRAVENOUS; SUBCUTANEOUS at 14:46

## 2021-11-09 RX ADMIN — HYDROMORPHONE HYDROCHLORIDE 1 MG: 1 INJECTION, SOLUTION INTRAMUSCULAR; INTRAVENOUS; SUBCUTANEOUS at 18:38

## 2021-11-09 RX ADMIN — HYDROMORPHONE HYDROCHLORIDE 1 MG: 1 INJECTION, SOLUTION INTRAMUSCULAR; INTRAVENOUS; SUBCUTANEOUS at 09:43

## 2021-11-09 RX ADMIN — HEPARIN SODIUM 2000 UNITS: 1000 INJECTION INTRAVENOUS; SUBCUTANEOUS at 08:46

## 2021-11-09 RX ADMIN — ATORVASTATIN CALCIUM 20 MG: 20 TABLET, FILM COATED ORAL at 12:34

## 2021-11-09 RX ADMIN — CALCIUM ACETATE 1334 MG: 667 CAPSULE ORAL at 07:26

## 2021-11-09 RX ADMIN — WARFARIN SODIUM 3 MG: 1 TABLET ORAL at 17:34

## 2021-11-09 RX ADMIN — ASPIRIN 81 MG: 81 TABLET, CHEWABLE ORAL at 12:33

## 2021-11-09 RX ADMIN — Medication 10 ML: at 05:04

## 2021-11-09 RX ADMIN — OXYCODONE 5 MG: 5 TABLET ORAL at 16:55

## 2021-11-09 NOTE — PROGRESS NOTES
Transition of Care Plan:    RUR:16%  Disposition:Home  Follow up appointments:  DME needed:none  Transportation at [address] or means to access home:        IM Medicare Letter:2nd IM needed  Is patient a BCPI-A Bundle: If yes, was Bundle Letter given?:     Caregiver Contact:sisterLashae [phone]  Discharge Caregiver contacted prior to discharge? Patient is expected to d/c home tomorrow. No needs anticipated. CM will continue to follow.     Eliceo White  Ext [phone]

## 2021-11-09 NOTE — PROGRESS NOTES
Pharmacist Daily Dosing of Warfarin    Indication & Goal INR: AFib, INR Goal 2-3   has pacemaker, Hx DVT and graft thromboses, Hx HIT    PTA Warfarin Dose: 2.5 mg daily    Notable concurrent conditions and medications:     Labs:  Recent Labs     11/09/21  0822 11/09/21  0244 11/08/21 1812 11/08/21  1812 11/07/21  2227 11/07/21  0226   INR  --  1.9*  --  2.0*  --  2.1*   HGB 7.2*  --    < > 8.0*   < > 6.6*     --   --  166  --  147*   ALB 2.2*  --   --   --   --   --     < > = values in this interval not displayed. Impression/Plan:   Warfarin 3 mg 11/9   Daily INR ordered  CBC w/o differential every other day has been ordered     Pharmacy will follow daily and adjust the dose as appropriate.     Thank you,  Lisa Parsons, PHARMD

## 2021-11-09 NOTE — PROGRESS NOTES
Hospitalist Progress Note    NAME: Tommie Riojas   :  1975   MRN:  [id number]       Assessment / Plan:  Coag negative staph aureus bacteremia /  Streptococcus intermedius bacteremia / bottles  Sepsis  History of AV graft  --Presented with generalized body ache, nausea, vomiting  Sepsis indicatorstachycardia, tachypnea, fever. CT abdomen/pelvisno acute abnormality. Chest x-rayNo focal airspace process. Mild cardiomegaly with probable pulmonary  arterial enlargement. Has a similar presentation in 2021 when the patient has persistent bacteremia and got ICD removed. --Had another fever spike today and repeat blood cultures were drawn. Monitor blood cultures.  -Follow final blood culture results.    -Continue empiric vancomycin and Zosyn  -Appreciate recommendations from ID  -2D echo shows mild mitral valve thickening. He may need a EVER. Will defer to ID.  -Waiting on WBC tagged study today to look for graft infection  -Consider vascular surgery evaluation     End-stage renal disease on dialysis  Hyperkalemia, hyponatremia, and met acidosis secondary to above  Renal following for hemodialysis needs. Continue with the PhosLo and calcitriol.     Acute on chronic anemia  -S/p 1 unit of PRBC. Hemoglobin is 7.2    History of DVT with graft thrombosis  -Continue Coumadin. Pharmacy managing warfarin. INR is 1.9. 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     Estimated discharge date:   Barriers: Clinical improvement, treatment of bacteremia      Code Status: Full code  Surrogate Decision Maker:     DVT Prophylaxis: Coumadin  GI Prophylaxis: not indicated     Baseline: Ambulatory at home     Subjective:     Chief Complaint / Reason for Physician Visit  Does not report any abdominal pain. Reports mild nausea. No vomiting. Had fever of 100.4        Review of Systems:  Symptom Y/N Comments  Symptom Y/N Comments   Fever/Chills    Chest Pain     Poor Appetite    Edema     Cough    Abdominal Pain     Sputum    Joint Pain     SOB/ZAMUDIO    Pruritis/Rash     Nausea/vomit    Tolerating PT/OT     Diarrhea    Tolerating Diet     Constipation    Other       Could NOT obtain due to:      Objective:     VITALS:   Last 24hrs VS reviewed since prior progress note. Most recent are:  Patient Vitals for the past 24 hrs:   Temp Pulse Resp BP SpO2   11/09/21 1233  100  116/70    11/09/21 1130  90  118/65    11/09/21 1115  84  131/72    11/09/21 1100  84  123/77    11/09/21 1045  90  130/72    11/09/21 1030  88  132/68    11/09/21 1015  97  123/72    11/09/21 1000  94  132/76    11/09/21 0945  94  132/76    11/09/21 0930  89  130/67    11/09/21 0915  92  130/72    11/09/21 0900  93  137/78    11/09/21 0845  89  131/71    11/09/21 0830  93  129/77    11/09/21 0813  96  128/77    11/09/21 0758 (!) 101.2 °F (38.4 °C) 96 18 130/73    11/09/21 0423 99.3 °F (37.4 °C) 87 18 117/73 96 %   11/08/21 2003 98.3 °F (36.8 °C) 83 19 124/66 96 %   11/08/21 1746 98.5 °F (36.9 °C) 90 16 110/72 98 %       Intake/Output Summary (Last 24 hours) at 11/9/2021 1339  Last data filed at 11/9/2021 0504  Gross per 24 hour   Intake 100 ml   Output    Net 100 ml        I had a face to face encounter and independently examined this patient on 11/9/2021, as outlined below:  PHYSICAL EXAM:  General: Alert, cooperative, no acute distress    EENT:  EOMI. Anicteric sclerae. MMM  Resp:  CTA bilaterally, no wheezing or rales. No accessory muscle use  CV:  Regular  rhythm,  +3 bilateral LE edema, ICD scar without evidence of infection, LUE AV graft  GI:  Soft, Non distended, Non tender.   +Bowel sounds  Neurologic:  Alert and oriented X 3, normal speech,   Psych:   Good insight. Not anxious nor agitated  Skin:  No rashes. No jaundice    Reviewed most current lab test results and cultures  YES  Reviewed most current radiology test results   YES  Review and summation of old records today    NO  Reviewed patient's current orders and MAR    YES  PMH/SH reviewed - no change compared to H&P  ________________________________________________________________________  Care Plan discussed with:    Comments   Patient x    Family      RN x    Care Manager     Consultant                        Multidiciplinary team rounds were held today with , nursing, pharmacist and clinical coordinator. Patient's plan of care was discussed; medications were reviewed and discharge planning was addressed. ________________________________________________________________________  Total NON critical care TIME:  35  Minutes    Total CRITICAL CARE TIME Spent:   Minutes non procedure based      Comments   >50% of visit spent in counseling and coordination of care x    ________________________________________________________________________  Mervin Schirmer, MD     Procedures: see electronic medical records for all procedures/Xrays and details which were not copied into this note but were reviewed prior to creation of Plan. LABS:  I reviewed today's most current labs and imaging studies. Pertinent labs include:  Recent Labs     11/09/21  0822 11/08/21 1812 11/07/21 2227 11/07/21 0226 11/07/21  0226   WBC 12.9* 13.8*  --   --  10.9   HGB 7.2* 8.0* 7.6*   < > 6.6*   HCT 21.7* 23.6* 22.7*   < > 19.9*    166  --   --  147*    < > = values in this interval not displayed.      Recent Labs     11/09/21  0822 11/09/21 0244 11/08/21 1812 11/07/21 0226   *  --  131* 132*   K 4.5  --  4.2 4.0   CL 96*  --  94* 96*   CO2 21  --  24 25   GLU 77  --  96 96   BUN 61*  --  56* 41*   CREA 13.00*  --  11.70* 8.99*   CA 8.6  --  9.4 8.3*   PHOS 5.7*  --   --   --    ALB 2.2*  --   --   --    INR --  1.9* 2.0* 2.1*       Signed: Divya West MD

## 2021-11-09 NOTE — PROCEDURES
Hemodialysis / [phone]    Vitals Pre Post Assessment Pre Post   /73 136/81 LOC A & O x 4 A & O x 4   HR 96 95 Lungs Clear bilat Clear bilat   Resp Resp Rate: 18 (11/09/21 0758) 18 Cardiac WNL WNL   Temp Temp: (!) 101.2 °F (38.4 °C) (11/09/21 0758) 99.1   Skin Dry and intact Dry and intact   Weight  68 kg 66kg Edema none none   Tele status   Pain Pain Intensity 1: 8 (11/08/21 2102) 3     Orders   Duration: Start: 0813 End: 9484 Total: 3.5 hours   Dialyzer: Dialyzer/Set Up Inspection: Revaclear (11/09/21 0813)   K Bath: Dialysate K (mEq/L): 3 (11/09/21 0813)   Ca Bath: Dialysate CA (mEq/L): 2.5 (11/09/21 0813)   Na: Dialysate NA (mEq/L): 138 (11/07/21 1204)   Bicarb: Dialysate HCO3 (mEq/L): 35 (11/09/21 0813)   Target Fluid Removal: Goal/Amount of Fluid to Remove (mL): 2000 mL (11/09/21 0813)     Access   Type & Location: SUZANNE-AVG: +T/B, Cannulated with 15g needles without issues,    Comments:                                        Labs   HBsAg (Antigen) / date:    Negative 11/6/21                                           HBsAb (Antibody) / date: Immune 11/6/21   Source: [facility]   Obtained/Reviewed  Critical Results Called HGB   Date Value Ref Range Status   11/08/2021 8.0 (L) 12.1 - 17.0 g/dL Final     Potassium   Date Value Ref Range Status   11/08/2021 4.2 3.5 - 5.1 mmol/L Final     Calcium   Date Value Ref Range Status   11/08/2021 9.4 8.5 - 10.1 MG/DL Final     BUN   Date Value Ref Range Status   11/08/2021 56 (H) 6 - 20 MG/DL Final     Creatinine   Date Value Ref Range Status   11/08/2021 11.70 (H) 0.70 - 1.30 MG/DL Final     Comment:     INVESTIGATED PER DELTA CHECK PROTOCOL        Meds Given   Name Dose Route                    Adequacy / Fluid    Total Liters Process: 79.1L   Net Fluid Removed: 2000mL      Comments   Time Out Done:   (Time) 1383   Admitting Diagnosis:    Consent obtained/signed: Informed Consent Verified: Yes (11/09/21 0813)   Machine / RO # Machine Number: [id number] (11/07/21 1204)   Primary Nurse Rpt Pre: Kurt Vásquez RN   Primary Nurse Rpt Post: Kurt Vásquez RN   Pt Education: procedure   Care Plan: To continue HD   Pts outpatient clinic:      Tx Summary   Comments:    Pt arrived to HD [address]. Consent signed & on file. SBAR received from Primary RN.  5289: Pt cannulated with 47B needles per policy & without issue. Labs drawn per request/ order. VSS. Dialysis Tx initiated. 0900: Pt resting quietly. 1143: Tx ended. VSS. All possible blood returned to patient. Hemostasis achieved without issue. Bed locked and in the lowest position, call bell and belongings in reach. SBAR given to Primary, RN. Patient is stable at time of their/ my departure. All Dialysis related medications have been reviewed. Bill Chung

## 2021-11-09 NOTE — PROGRESS NOTES
TRANSFER - IN REPORT:    Verbal report received from [facility] on German Story  being received from M.SEBASTIAN for routine progression of care      Report consisted of patients Situation, Background, Assessment and   Recommendations(SBAR). Information from the following report(s) SBAR was reviewed with the receiving nurse. Opportunity for questions and clarification was provided. Assessment completed upon patients arrival to unit and care assumed.

## 2021-11-09 NOTE — CONSULTS
Consult acknowledged:  Lonnie Lomeli a 42 y. o.  with a hx of ASHD, AICD x3, HTN, HLD, ESRF, Recurrent DVT s/p IVC, GERD, and SBO.  He is well-known patient of the practice. He is routinely under the care of Dr. Lico Lopes. He has a history of recurrent gram-positive bacteremia despite removal of AICD. He is admitted on this occasion with the same complicated by sepsis. He is s/p creation of a RUE bovine AVG (now w/o flow) & LUE Lovely-Ryan AVG 9/2020. His left upper extremity graft was explored for infection in April of this year. Surgical cultures showed no growth at that time and the graft remains. The only other known hardware that he has is an IVC filter. Since our last visit an AICD was removed and replaced with subcutaneous AICD at [facility]. Following that procedure he did have negative blood cultures per infectious disease. A white blood cell tagged scan is pending for today. Formal consult to follow in the a.m.

## 2021-11-09 NOTE — PROGRESS NOTES
HD TRANSFER - OUT REPORT:    Verbal report given to JERAD Jimenez RN on Daniel Echols being transferred to M.T. for routine progression of care       Report consisted of patient's Situation, Background, Assessment and   Recommendations(SBAR). Information from the following report(s) SBAR was reviewed with the receiving nurse. Method:  $$ Method: Hemodialysis (11/09/21 0813)    Fluid Removed  NET Fluid Removed (mL): 2200 ml (11/07/21 1402)     Patient response to treatment:  Unchanged     End Time  Hemodialysis End Time: 1402 (11/07/21 1402)  If not documented, dialysis nurse to update post-dialysis row in HD/Filtration flowsheet     Medications /Volume expansion agents or Fluid boluses administered during treatment? no    Post-dialysis medication administration due?  no  Remind nurse to administer post-HD medication upon return to unit. Fistula hemostasis? yes    Line heparinization? no    Lines:     Opportunity for questions and clarification was provided.       Patient transported with: [employer]

## 2021-11-09 NOTE — PROGRESS NOTES
Bedside and Verbal shift change report given to [facility] [place] [facility]"" (oncoming nurse) by Adelfo Vega (offgoing nurse). Report included the following information SBAR, Kardex, Intake/Output, MAR and Recent Results.

## 2021-11-09 NOTE — PROGRESS NOTES
Bedside and Verbal shift change report given to [facility] (oncoming nurse) by Mathew Nolasco (offgoing nurse). Report included the following information SBAR, Kardex, Intake/Output, MAR and Recent Results.

## 2021-11-09 NOTE — PROGRESS NOTES
Nephrology Progress Note  Blaise Leblanc     [email or web address]  Phone - [phone]   Patient: German Story    YOB: 1975        Date- 11/9/2021   Admit Date: 11/5/2021  CC: Follow up for  ESRD          IMPRESSION & PLAN:   · End-stage renal disease home HD 5 days per week- Follows up with [facility] Marilou [facility]  · Failed RTX times 2  · Hyperkalemia  · Gram positive sepsis with lead extraction at [facility]  · Anemia of ckd  · Hx of renal tx in past times 2.  · Hypertension  · CAD         H/o DVT, s/p ivc filter/ h/o HIT     PLAN-   Plan for HD today   Will keep him on TTS schedule for now   Ordered for tagged WBC scan to rule out AVG infection, still undergoing   Appreciate ID input   Will follow with you. Subjective: Interval History:   -Seen and examined today on HD  -We will be getting tagged WBC scan today to rule out infection of the left upper extremity AV graft      Objective:   Vitals:    11/09/21 1045 11/09/21 1100 11/09/21 1115 11/09/21 1130   BP: 130/72 123/77 131/72 118/65   Pulse: 90 84 84 90   Resp:       Temp:       TempSrc:       SpO2:       Weight:       Height:          11/08 0701 - 11/09 0700  In: 640 [P.O.:540; I.V.:100]  Out: -   Last 3 Recorded Weights in this Encounter    11/07/21 0058 11/07/21 1515 11/07/21 2137   Weight: 69.2 kg (152 lb 9.6 oz) 68.9 kg (152 lb) 64.8 kg (142 lb 12.8 oz)      Physical exam:   GEN: NAD  NECK- Supple, no mass  RESP: No wheezing, Clear b/l  CVS: S1,S2  RRR  NEURO: Normal speech, Non focal  EXT: No Edema   PSYCH: Normal Mood  HD acess; R AVG    Chart reviewed. Pertinent Notes reviewed.      Data Review :  Recent Labs     11/09/21  0822 11/08/21  1812 11/07/21  0226   * 131* 132*   K 4.5 4.2 4.0   CL 96* 94* 96*   CO2 21 24 25   BUN 61* 56* 41*   CREA 13.00* 11.70* 8.99*   GLU 77 96 96   CA 8.6 9.4 8.3*   PHOS 5.7*  --   --      Recent Labs     11/09/21  0822 11/08/21 1812 11/07/21  2229 11/07/21 0226 11/07/21 0226   WBC 12.9* 13.8*  --   --  10.9   HGB 7.2* 8.0* 7.6*   < > 6.6*   HCT 21.7* 23.6* 22.7*   < > 19.9*    166  --   --  147*    < > = values in this interval not displayed. No results for input(s): FE, TIBC, PSAT, FERR in the last 72 hours.    Medication list  reviewed  Current Facility-Administered Medications   Medication Dose Route Frequency    warfarin (COUMADIN) tablet 3 mg  3 mg Oral ONCE    0.9% sodium chloride infusion 250 mL  250 mL IntraVENous PRN    oxyCODONE IR (ROXICODONE) tablet 5 mg  5 mg Oral Q4H PRN    HYDROmorphone (DILAUDID) injection 1 mg  1 mg IntraVENous Q4H PRN    WARFARIN INFORMATION NOTE (COUMADIN)   Other QPM    aspirin chewable tablet 81 mg  81 mg Oral DAILY    calcitRIOL (ROCALTROL) capsule 0.5 mcg  0.5 mcg Oral DAILY    atorvastatin (LIPITOR) tablet 20 mg  20 mg Oral DAILY    calcium acetate(phosphat bind) (PHOSLO) capsule 1,334 mg  2 Capsule Oral TID WITH MEALS    metoprolol succinate (TOPROL-XL) XL tablet 12.5 mg  12.5 mg Oral DAILY    pantoprazole (PROTONIX) tablet 40 mg  40 mg Oral ACB    piperacillin-tazobactam (ZOSYN) 3.375 g in 0.9% sodium chloride (MBP/ADV) 100 mL MBP  3.375 g IntraVENous Q12H    VANCOMYCIN INFORMATION NOTE   Other Rx Dosing/Monitoring    sodium chloride (NS) flush 5-40 mL  5-40 mL IntraVENous Q8H    sodium chloride (NS) flush 5-40 mL  5-40 mL IntraVENous PRN    acetaminophen (TYLENOL) tablet 650 mg  650 mg Oral Q6H PRN    Or    acetaminophen (TYLENOL) suppository 650 mg  650 mg Rectal Q6H PRN    polyethylene glycol (MIRALAX) packet 17 g  17 g Oral DAILY PRN    ondansetron (ZOFRAN ODT) tablet 4 mg  4 mg Oral Q8H PRN    Or    ondansetron (ZOFRAN) injection 4 mg  4 mg IntraVENous Q6H PRN          Ellie Amezquita MD              [facility]  [facility]  [address], [address]  [place], [address]  Phone - [phone]               Fax - [phone]

## 2021-11-09 NOTE — PROGRESS NOTES
Infectious Disease Progress Note         Interval:  Fever of 101.2F this morning    Subjective:   Patient seen at dialysis today. Patient reports he overall feels the same as yesterday. He was having abdominal pain and some lower back pain this morning which has since resolved. Patient denies any rigors or chills as he is being dialyzed via the left arm graft today. Denies any pain at the left chest ICD. Objective:    Vitals:   Reviewed in chart. Physical Exam:  ? GEN: NAD, tired-appearing. ? HEENT: Normocephalic, atraumatic, PERRL, no scleral icterus  ? CV: S1, S2 heard regularly, thrills from AVG heard, left chest ICD no inflammation or tenderness  ? Lungs: Clear to auscultation bilaterally  ? Abdomen: soft, non distended, non tender  ? Genitourinary:  no oneal  ? Extremities: no edema  ? Neuro: Alert, oriented to time, place and situation, moves all extremities to commands, verbal   ? Skin: no rash  ? Psych: good affect, good eye contact, non tearful   ? Lines: left arm AVG is swollen and warm. Non-tender. Has a good thrill. Labs:  Recent Results (from the past 24 hour(s))   PROTHROMBIN TIME + INR    Collection Time: 11/08/21  6:12 PM   Result Value Ref Range    INR 2.0 (H) 0.9 - 1.1      Prothrombin time 20.4 (H) 9.0 - 11.1 sec   CBC WITH AUTOMATED DIFF    Collection Time: 11/08/21  6:12 PM   Result Value Ref Range    WBC 13.8 (H) 4.1 - 11.1 K/uL    RBC 2.60 (L) 4.10 - 5.70 M/uL    HGB 8.0 (L) 12.1 - 17.0 g/dL    HCT 23.6 (L) 36.6 - 50.3 %    MCV 90.8 80.0 - 99.0 FL    MCH 30.8 26.0 - 34.0 PG    MCHC 33.9 30.0 - 36.5 g/dL    RDW 18.5 (H) 11.5 - 14.5 %    PLATELET 087 339 - 286 K/uL    MPV 11.3 8.9 - 12.9 FL    NRBC 0.0 0  WBC    ABSOLUTE NRBC 0.00 0.00 - 0.01 K/uL    NEUTROPHILS 87 (H) 32 - 75 %    LYMPHOCYTES 4 (L) 12 - 49 %    MONOCYTES 5 5 - 13 %    EOSINOPHILS 4 0 - 7 %    BASOPHILS 0 0 - 1 %    IMMATURE GRANULOCYTES 0 0.0 - 0.5 %    ABS.  NEUTROPHILS 11.9 (H) 1.8 - 8.0 K/UL    ABS. LYMPHOCYTES 0.6 (L) 0.8 - 3.5 K/UL    ABS. MONOCYTES 0.7 0.0 - 1.0 K/UL    ABS. EOSINOPHILS 0.6 (H) 0.0 - 0.4 K/UL    ABS. BASOPHILS 0.0 0.0 - 0.1 K/UL    ABS. IMM.  GRANS. 0.0 0.00 - 0.04 K/UL    DF SMEAR SCANNED      RBC COMMENTS TARGET CELLS  1+        RBC COMMENTS ANISOCYTOSIS  1+       METABOLIC PANEL, BASIC    Collection Time: 11/08/21  6:12 PM   Result Value Ref Range    Sodium 131 (L) 136 - 145 mmol/L    Potassium 4.2 3.5 - 5.1 mmol/L    Chloride 94 (L) 97 - 108 mmol/L    CO2 24 21 - 32 mmol/L    Anion gap 13 5 - 15 mmol/L    Glucose 96 65 - 100 mg/dL    BUN 56 (H) 6 - 20 MG/DL    Creatinine 11.70 (H) 0.70 - 1.30 MG/DL    BUN/Creatinine ratio 5 (L) 12 - 20      GFR est AA 6 (L) >60 ml/min/1.73m2    GFR est non-AA 5 (L) >60 ml/min/1.73m2    Calcium 9.4 8.5 - 10.1 MG/DL   PROTHROMBIN TIME + INR    Collection Time: 11/09/21  2:44 AM   Result Value Ref Range    INR 1.9 (H) 0.9 - 1.1      Prothrombin time 19.7 (H) 9.0 - 11.1 sec   VANCOMYCIN, RANDOM    Collection Time: 11/09/21  2:44 AM   Result Value Ref Range    Vancomycin, random 36.6 UG/ML   RENAL FUNCTION PANEL    Collection Time: 11/09/21  8:22 AM   Result Value Ref Range    Sodium 132 (L) 136 - 145 mmol/L    Potassium 4.5 3.5 - 5.1 mmol/L    Chloride 96 (L) 97 - 108 mmol/L    CO2 21 21 - 32 mmol/L    Anion gap 15 5 - 15 mmol/L    Glucose 77 65 - 100 mg/dL    BUN 61 (H) 6 - 20 MG/DL    Creatinine 13.00 (H) 0.70 - 1.30 MG/DL    BUN/Creatinine ratio 5 (L) 12 - 20      GFR est AA 5 (L) >60 ml/min/1.73m2    GFR est non-AA 4 (L) >60 ml/min/1.73m2    Calcium 8.6 8.5 - 10.1 MG/DL    Phosphorus 5.7 (H) 2.6 - 4.7 MG/DL    Albumin 2.2 (L) 3.5 - 5.0 g/dL   CBC W/O DIFF    Collection Time: 11/09/21  8:22 AM   Result Value Ref Range    WBC 12.9 (H) 4.1 - 11.1 K/uL    RBC 2.37 (L) 4.10 - 5.70 M/uL    HGB 7.2 (L) 12.1 - 17.0 g/dL    HCT 21.7 (L) 36.6 - 50.3 %    MCV 91.6 80.0 - 99.0 FL    MCH 30.4 26.0 - 34.0 PG    MCHC 33.2 30.0 - 36.5 g/dL    RDW 18.2 (H) 11.5 - 14.5 %    PLATELET 572 187 - 110 K/uL    MPV 11.2 8.9 - 12.9 FL    NRBC 0.0 0  WBC    ABSOLUTE NRBC 0.00 0.00 - 0.01 K/uL             Assessment:  42y.o. 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  Patient has the nonendovascular ICD in his left chest from before. This was not removed at [facility] because the suspicion for involvement of this ICD from the infection was low. Also has hx of H/o DVT, s/p ivc filter/ h/o HIT.     This admission:  - Sepsis from CoNS bacteremia. Symptoms ongoing for ~6 days PTA. Per exam, suspicion is high for AVG involvement. Nephrology has ordered WBC tagged study. Case discussed with Dr. Monroe Riggins today. Left chest ICD is non-endovascular. Hence, not at high risk for infectious involvement from bloodstream infections.            Recommendations:  - Per exam, suspicion is high for AVG involvement. Awaiting WBC tagged study today. Given that the clinical suspicion for the graft to be infected is significant, vascular surgery evaluation will be sought. Discussed with Dr. Monroe Riggins today.   -Speciation and susceptibilities of the coagulase-negative staph is pending at this time. - F/u repeat blood cultures today (ordered) given fever spike this morning.   - Any fever >101F, must please obtain repeat blood cultures if feasible. - Left chest ICD is entirely non-endovascular. Hence, not at high risk for infectious involvement from bloodstream infections. Can continue to monitor for now.    - TTE negative, if more BCx positive, low threshold to proceed to EVER.   - Continue empiric vancomycin and zosyn for now. - Patient is not vaccinated against Covid-19. He reports he did not receive his Covid vaccine yet because at the time when his dialysis center was administering antibiotics, he was admitted to the hospital.  He then never sought to get the vaccine elsewhere. He expressed interest in getting this once cleared to.        Will follow. Thank you for the opportunity to participate in the care of this patient. Please contact with questions or concerns.       Radha Gomez MD  Infectious Diseases

## 2021-11-10 ENCOUNTER — APPOINTMENT (OUTPATIENT)
Dept: VASCULAR SURGERY | Age: 46
DRG: 853 | End: 2021-11-10
Attending: STUDENT IN AN ORGANIZED HEALTH CARE EDUCATION/TRAINING PROGRAM
Payer: MEDICARE

## 2021-11-10 ENCOUNTER — APPOINTMENT (OUTPATIENT)
Dept: ULTRASOUND IMAGING | Age: 46
DRG: 853 | End: 2021-11-10
Attending: STUDENT IN AN ORGANIZED HEALTH CARE EDUCATION/TRAINING PROGRAM
Payer: MEDICARE

## 2021-11-10 ENCOUNTER — APPOINTMENT (OUTPATIENT)
Dept: CT IMAGING | Age: 46
DRG: 853 | End: 2021-11-10
Attending: STUDENT IN AN ORGANIZED HEALTH CARE EDUCATION/TRAINING PROGRAM
Payer: MEDICARE

## 2021-11-10 LAB
ANION GAP SERPL CALC-SCNC: 11 MMOL/L (ref 5–15)
BACTERIA SPEC CULT: ABNORMAL
BUN SERPL-MCNC: 33 MG/DL (ref 6–20)
BUN/CREAT SERPL: 4 (ref 12–20)
CALCIUM SERPL-MCNC: 8.7 MG/DL (ref 8.5–10.1)
CHLORIDE SERPL-SCNC: 97 MMOL/L (ref 97–108)
CO2 SERPL-SCNC: 26 MMOL/L (ref 21–32)
CREAT SERPL-MCNC: 8.06 MG/DL (ref 0.7–1.3)
ERYTHROCYTE [DISTWIDTH] IN BLOOD BY AUTOMATED COUNT: 18.2 % (ref 11.5–14.5)
GLUCOSE SERPL-MCNC: 86 MG/DL (ref 65–100)
HCT VFR BLD AUTO: 22.3 % (ref 36.6–50.3)
HGB BLD-MCNC: 7.2 G/DL (ref 12.1–17)
INR PPP: 2.1 (ref 0.9–1.1)
MCH RBC QN AUTO: 30.4 PG (ref 26–34)
MCHC RBC AUTO-ENTMCNC: 32.3 G/DL (ref 30–36.5)
MCV RBC AUTO: 94.1 FL (ref 80–99)
NRBC # BLD: 0 K/UL (ref 0–0.01)
NRBC BLD-RTO: 0 PER 100 WBC
PLATELET # BLD AUTO: 146 K/UL (ref 150–400)
PMV BLD AUTO: 11.2 FL (ref 8.9–12.9)
POTASSIUM SERPL-SCNC: 4.1 MMOL/L (ref 3.5–5.1)
PROTHROMBIN TIME: 21.1 SEC (ref 9–11.1)
RBC # BLD AUTO: 2.37 M/UL (ref 4.1–5.7)
SERVICE CMNT-IMP: ABNORMAL
SODIUM SERPL-SCNC: 134 MMOL/L (ref 136–145)
WBC # BLD AUTO: 10 K/UL (ref 4.1–11.1)

## 2021-11-10 PROCEDURE — 70486 CT MAXILLOFACIAL W/O DYE: CPT

## 2021-11-10 PROCEDURE — 99233 SBSQ HOSP IP/OBS HIGH 50: CPT | Performed by: STUDENT IN AN ORGANIZED HEALTH CARE EDUCATION/TRAINING PROGRAM

## 2021-11-10 PROCEDURE — 74011250637 HC RX REV CODE- 250/637: Performed by: STUDENT IN AN ORGANIZED HEALTH CARE EDUCATION/TRAINING PROGRAM

## 2021-11-10 PROCEDURE — 93990 DOPPLER FLOW TESTING: CPT

## 2021-11-10 PROCEDURE — 85027 COMPLETE CBC AUTOMATED: CPT

## 2021-11-10 PROCEDURE — 74011250636 HC RX REV CODE- 250/636: Performed by: STUDENT IN AN ORGANIZED HEALTH CARE EDUCATION/TRAINING PROGRAM

## 2021-11-10 PROCEDURE — 74011000258 HC RX REV CODE- 258: Performed by: STUDENT IN AN ORGANIZED HEALTH CARE EDUCATION/TRAINING PROGRAM

## 2021-11-10 PROCEDURE — 36415 COLL VENOUS BLD VENIPUNCTURE: CPT

## 2021-11-10 PROCEDURE — 80048 BASIC METABOLIC PNL TOTAL CA: CPT

## 2021-11-10 PROCEDURE — 65270000029 HC RM PRIVATE

## 2021-11-10 PROCEDURE — 85610 PROTHROMBIN TIME: CPT

## 2021-11-10 PROCEDURE — 76604 US EXAM CHEST: CPT

## 2021-11-10 RX ORDER — WARFARIN 1 MG/1
2.5 TABLET ORAL ONCE
Status: COMPLETED | OUTPATIENT
Start: 2021-11-10 | End: 2021-11-10

## 2021-11-10 RX ADMIN — HYDROMORPHONE HYDROCHLORIDE 1 MG: 1 INJECTION, SOLUTION INTRAMUSCULAR; INTRAVENOUS; SUBCUTANEOUS at 16:38

## 2021-11-10 RX ADMIN — CEFTRIAXONE SODIUM 2 G: 2 INJECTION, POWDER, FOR SOLUTION INTRAMUSCULAR; INTRAVENOUS at 12:28

## 2021-11-10 RX ADMIN — Medication 5 ML: at 06:59

## 2021-11-10 RX ADMIN — CALCIUM ACETATE 1334 MG: 667 CAPSULE ORAL at 16:38

## 2021-11-10 RX ADMIN — ATORVASTATIN CALCIUM 20 MG: 20 TABLET, FILM COATED ORAL at 08:24

## 2021-11-10 RX ADMIN — Medication 5 ML: at 21:00

## 2021-11-10 RX ADMIN — METOPROLOL SUCCINATE 12.5 MG: 25 TABLET, EXTENDED RELEASE ORAL at 08:24

## 2021-11-10 RX ADMIN — CALCIUM ACETATE 1334 MG: 667 CAPSULE ORAL at 12:28

## 2021-11-10 RX ADMIN — Medication 10 ML: at 12:30

## 2021-11-10 RX ADMIN — HYDROMORPHONE HYDROCHLORIDE 1 MG: 1 INJECTION, SOLUTION INTRAMUSCULAR; INTRAVENOUS; SUBCUTANEOUS at 08:24

## 2021-11-10 RX ADMIN — OXYCODONE 5 MG: 5 TABLET ORAL at 09:59

## 2021-11-10 RX ADMIN — WARFARIN SODIUM 2.5 MG: 1 TABLET ORAL at 17:58

## 2021-11-10 RX ADMIN — PANTOPRAZOLE SODIUM 40 MG: 40 TABLET, DELAYED RELEASE ORAL at 07:47

## 2021-11-10 RX ADMIN — HYDROMORPHONE HYDROCHLORIDE 1 MG: 1 INJECTION, SOLUTION INTRAMUSCULAR; INTRAVENOUS; SUBCUTANEOUS at 12:29

## 2021-11-10 RX ADMIN — HYDROMORPHONE HYDROCHLORIDE 1 MG: 1 INJECTION, SOLUTION INTRAMUSCULAR; INTRAVENOUS; SUBCUTANEOUS at 21:00

## 2021-11-10 RX ADMIN — HYDROMORPHONE HYDROCHLORIDE 1 MG: 1 INJECTION, SOLUTION INTRAMUSCULAR; INTRAVENOUS; SUBCUTANEOUS at 04:19

## 2021-11-10 RX ADMIN — PIPERACILLIN AND TAZOBACTAM 3.38 G: 3; .375 INJECTION, POWDER, LYOPHILIZED, FOR SOLUTION INTRAVENOUS at 04:19

## 2021-11-10 RX ADMIN — CALCITRIOL CAPSULES 0.25 MCG 0.5 MCG: 0.25 CAPSULE ORAL at 08:24

## 2021-11-10 RX ADMIN — OXYCODONE 5 MG: 5 TABLET ORAL at 14:29

## 2021-11-10 RX ADMIN — CALCIUM ACETATE 1334 MG: 667 CAPSULE ORAL at 07:47

## 2021-11-10 RX ADMIN — OXYCODONE 5 MG: 5 TABLET ORAL at 19:38

## 2021-11-10 RX ADMIN — ASPIRIN 81 MG: 81 TABLET, CHEWABLE ORAL at 08:24

## 2021-11-10 NOTE — CONSULTS
Consult    NAME: Izabela Bullard   :  1975   MRN:  [id number]     Date/Time:  11/10/2021 4:02 PM    Patient PCP: Nikos Silvestre MD  ________________________________________________________________________     Assessment:     - AWMI with PCI of LAD in   - ICM EF 25%, EVER 3/2021 no signs of endocarditis, TTE 2021 EF 35%, sev pHTN  - Hx of ICD in , subsequent lead fracture and device was turned off, s/p [employer] Sub Q ICD, in  Intravenous ICD was extracted due to recurrent bacteremia, EVER with no overt infection  - Hx of IVC filter due to DVT  - ESRD on HD, failed renal transplant x 2, home HD Dr. Lico Guevara  - Anemia  - Thrombocytopnia, hx of HIT  - Recurrent bacteremia, follows with ID  - Hx of non-compliance. Plan:     - Recurrent bacteremia, ID following  - No clear source, AV graft not obviously infected, Sub Q ICD not obviously infected and typically would not cause bacteremia. Will proceed with EVER, All r/b/a reviewed. - Cont warfarin  - cont asa  - Cont toprol  - Cont atorvastatin        [x]        High complexity decision making was performed        Subjective:   CHIEF COMPLAINT: Weakness    HISTORY OF PRESENT ILLNESS:       Esau Tipton is a 42 y.o.  male who presents with nausea vomiting generalized weakness for 3 days. Patient past medical history of end-stage renal disease on dialysis, hypertension, CAD, DVT, systolic CHF with cardiomyopathy, gout. Patient stated for the last 3 days he is having nausea and vomiting along with fever with body ache and generalized weakness. Patient to dialysis at home but because of the weakness skip the dialysis today. Denies any chest pain, no shortness of breath, no cough, no changes in the weight and appetite, no other complaints. Similar presentation in the past for the patient has bacteremia. We were asked to consult for work up and evaluation of the above problems.      Past Medical History: Diagnosis Date    Abdominal hematoma     AICD (automatic cardioverter/defibrillator) present     CAD (coronary artery disease)     anterior MI s/p 2 stents  2007    Chronic abdominal pain     Chronic kidney disease     ESRD; Dialysis dependent.  Home [facility] does labs- [facility] tpke    DVT (deep venous thrombosis) ([address] 2001    DVT of popliteal vein ([address] 11/2011    not anticoagulated due to bleeding, IVC filter    Endocarditis     Gallstone pancreatitis     Gastrointestinal disorder     acid reflux    Gastrointestinal disorder     peptic ulcer    Hemodialysis patient ([address]     High cholesterol     Hypertension     Kidney transplant     b/l kidneys 1995, 2001    Long term current use of anticoagulant therapy     Nephrotic syndrome     Other ill-defined conditions(799.89)     kidny transplant x2,  on dialysis    Other ill-defined conditions(799.89)     home dialysis    Other ill-defined conditions(799.89)     hx recurrent left leg DVT    Peritonitis ([address]     Seizures ([address] 2015    most recent- only had three in lifetime    Small bowel obstruction (HCC)     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    V-tach [facility])       Past Surgical History:   Procedure Laterality Date    HX APPENDECTOMY      HX CHOLECYSTECTOMY      HX OTHER SURGICAL      cholecystectomy    HX OTHER SURGICAL  11/2011    exploratory laparotomy    HX OTHER SURGICAL      fem-pop    HX OTHER SURGICAL  02/2013    right upper extremity fistula    HX PACEMAKER Left 6/2009    AICD-st latonya-not connected    HX PACEMAKER Left     AICD-left side-BS-working    HX SMALL BOWEL RESECTION      HX TRANSPLANT  2001     Kidney    HX TRANSPLANT  1992    kidney    HX UROLOGICAL      2 kidney transplants    HX VASCULAR ACCESS Right     femoral access for HD- does 5 day dialysis @ home    IR REMOVE TUNL CVAD W/O PORT / PUMP  10/29/2020    IR REPLACE CVC TUNNELED W/O PORT  9/10/2020    SD CARDIAC SURG PROCEDURE UNLIST  2007    2 stents    CT EDG US EXAM SURGICAL ALTER STOM DUODENUM/JEJUNUM  7/15/2011         CT ESOPHAGOGASTRODUODENOSCOPY TRANSORAL DIAGNOSTIC  5/24/2012         VASCULAR SURGERY PROCEDURE UNLIST  11/14/2017    Insertion AV graft right upper arm (bovine); ligation of AV fistula right arm     Allergies   Allergen Reactions    Shellfish Containing Products Swelling     Break out in rash, trouble breathing    Contrast Agent [Iodine] Shortness of Breath    Heparin Analogues Other (comments)     Positive history of HIT      Meds:  See below  Social History     Tobacco Use    Smoking status: Never Smoker    Smokeless tobacco: Never Used   Substance Use Topics    Alcohol use: No      Family History   Problem Relation Age of Onset    COPD Mother     Lung Disease Mother     Cancer Father         stomach    Cancer Maternal Grandmother        REVIEW OF SYSTEMS:     []         Unable to obtain  ROS due to ---   [x]         Total of 12 systems reviewed as follows:     Total of 12 systems reviewed as follows:       POSITIVE= Bold text  Negative = normal text  General:  fever, chills, sweats, generalized weakness, weight loss/gain,      loss of appetite   Eyes:    blurred vision, eye pain, loss of vision, double vision  ENT:    rhinorrhea, pharyngitis   Respiratory:   cough, sputum production, SOB, ZAMUDIO, wheezing, pleuritic pain   Cardiology:   chest pain, palpitations, orthopnea, PND, edema, syncope   Gastrointestinal:  abdominal pain , N/V, diarrhea, dysphagia, constipation, bleeding   Genitourinary:  frequency, urgency, dysuria, hematuria, incontinence   Muskuloskeletal :  arthralgia, myalgia, back pain  Hematology:  easy bruising, nose or gum bleeding, lymphadenopathy   Dermatological: rash, ulceration, pruritis, color change / jaundice  Endocrine:   hot flashes or polydipsia   Neurological:  headache, dizziness, confusion, focal weakness, paresthesia,     Speech difficulties, memory loss, gait difficulty  Psychological: Feelings of anxiety, depression, agitation    Objective:      Physical Exam:    Last 24hrs VS reviewed since prior progress note. Most recent are:    Visit Vitals  /68 (BP 1 Location: Right upper arm, BP Patient Position: Sitting)   Pulse 68   Temp 98.5 °F (36.9 °C)   Resp 18   Ht 5' 7\" (1.702 m)   Wt 64.8 kg (142 lb 12.8 oz)   SpO2 100%   BMI 22.37 kg/m²       Intake/Output Summary (Last 24 hours) at 11/10/2021 1602  Last data filed at 11/10/2021 0419  Gross per 24 hour   Intake 100 ml   Output    Net 100 ml        General Appearance: Well developed, older than stated age, alert & oriented x 3,    no acute distress. Ears/Nose/Mouth/Throat: Pupils equal and round, Hearing grossly normal.  Neck: Supple. JVP within normal limits. Carotids good upstrokes, with no bruit. Chest: Lungs clear to auscultation bilaterally. Cardiovascular: Regular rate and rhythm, S1S2 normal, no murmur, rubs, gallops. Abdomen: Soft, non-tender, bowel sounds are active. No organomegaly. Extremities: No edema bilaterally. Femoral pulses +2, Distal Pulses +1. Skin: Warm and dry. Neuro: CN II-XII grossly intact, Strength and sensation grossly intact. Data:      Prior to Admission medications    Medication Sig Start Date End Date Taking? Authorizing Provider   ondansetron (Zofran ODT) 4 mg disintegrating tablet 1 Tab by SubLINGual route every eight (8) hours as needed for Nausea or Vomiting. 3/27/21  Yes Alvarado Haines MD   metoprolol succinate (TOPROL-XL) 25 mg XL tablet Take 0.5 Tabs by mouth daily. 11/10/20  Yes Michael Mcgraw MD   calcium acetate,phosphat bind, (PHOSLO) 667 mg cap Take 2 Caps by mouth three (3) times daily (with meals). Indications: low amount of calcium in the blood, renal osteodystrophy with hyperphosphatemia 11/10/20  Yes Michael Mcgraw MD   atorvastatin (Lipitor) 20 mg tablet Take 20 mg by mouth daily.    Yes Provider, Historical   warfarin (COUMADIN) 2.5 mg tablet Take 2.5 mg by mouth daily. Yes Provider, Historical   acetaminophen (TYLENOL) 500 mg tablet Take 1 Tab by mouth every four (4) hours as needed for Pain. Over the counter 1/20/19  Yes Bhavna Perez MD   omeprazole (PRILOSEC) 20 mg capsule Take 20 mg by mouth daily. Yes Provider, Historical   calcitRIOL (ROCALTROL) 0.5 mcg capsule Take 0.5 mcg by mouth daily. Yes Provider, Historical   aspirin 81 mg chewable tablet Take 81 mg by mouth daily.    Yes Other, MD Morro       Recent Results (from the past 24 hour(s))   PROTHROMBIN TIME + INR    Collection Time: 11/10/21  6:57 AM   Result Value Ref Range    INR 2.1 (H) 0.9 - 1.1      Prothrombin time 21.1 (H) 9.0 - 11.1 sec   CBC W/O DIFF    Collection Time: 11/10/21  6:57 AM   Result Value Ref Range    WBC 10.0 4.1 - 11.1 K/uL    RBC 2.37 (L) 4.10 - 5.70 M/uL    HGB 7.2 (L) 12.1 - 17.0 g/dL    HCT 22.3 (L) 36.6 - 50.3 %    MCV 94.1 80.0 - 99.0 FL    MCH 30.4 26.0 - 34.0 PG    MCHC 32.3 30.0 - 36.5 g/dL    RDW 18.2 (H) 11.5 - 14.5 %    PLATELET 593 (L) 858 - 400 K/uL    MPV 11.2 8.9 - 12.9 FL    NRBC 0.0 0  WBC    ABSOLUTE NRBC 0.00 0.00 - 2.25 K/uL   METABOLIC PANEL, BASIC    Collection Time: 11/10/21  6:57 AM   Result Value Ref Range    Sodium 134 (L) 136 - 145 mmol/L    Potassium 4.1 3.5 - 5.1 mmol/L    Chloride 97 97 - 108 mmol/L    CO2 26 21 - 32 mmol/L    Anion gap 11 5 - 15 mmol/L    Glucose 86 65 - 100 mg/dL    BUN 33 (H) 6 - 20 MG/DL    Creatinine 8.06 (H) 0.70 - 1.30 MG/DL    BUN/Creatinine ratio 4 (L) 12 - 20      GFR est AA 9 (L) >60 ml/min/1.73m2    GFR est non-AA 7 (L) >60 ml/min/1.73m2    Calcium 8.7 8.5 - 10.1 MG/DL

## 2021-11-10 NOTE — PROGRESS NOTES
Pharmacist Daily Dosing of Warfarin    Indication & Goal INR: AFib, INR Goal 2-3   has pacemaker, Hx DVT and graft thromboses, Hx HIT    PTA Warfarin Dose: 2.5 mg daily    Notable concurrent conditions and medications:     Labs:  Recent Labs     11/10/21  0657 11/09/21  0822 11/09/21  0822 11/09/21  0244 11/08/21  1812 11/08/21  1812   INR 2.1*  --   --  1.9*  --  2.0*   HGB 7.2*   < > 7.2*  --    < > 8.0*   *  --  156  --   --  166   ALB  --   --  2.2*  --   --   --     < > = values in this interval not displayed. Impression/Plan:   Warfarin 2.5 mg 11/9   Daily INR ordered  CBC w/o differential every other day has been ordered     Pharmacy will follow daily and adjust the dose as appropriate.     Thank you,  Chapito Frost, PHARMD

## 2021-11-10 NOTE — PROGRESS NOTES
Spiritual Care Assessment/Progress Note  [facility]      NAME: Lucho De Luna      MRN: [id number]  AGE: 42 y.o.  SEX: male  Rastafari Affiliation: Yarsanism   Language: English     11/10/2021     Total Time (in minutes): 15     Spiritual Assessment begun in [facility] 2 MED TELE through conversation with:         [x]Patient        [] Family    [] Friend(s)        Reason for Consult: Initial/Spiritual assessment, patient floor     Spiritual beliefs: (Please include comment if needed)     [x] Identifies with a brown tradition:         [] Supported by a brown community:            [] Claims no spiritual orientation:           [] Seeking spiritual identity:                [] Adheres to an individual form of spirituality:           [] Not able to assess:                           Identified resources for coping:      [x] Prayer                               [] Music                  [] Guided Imagery     [x] Family/friends                 [] Pet visits     [] Devotional reading                         [] Unknown     [] Other:                                              Interventions offered during this visit: (See comments for more details)    Patient Interventions: Affirmation of emotions/emotional suffering, Affirmation of brown, Catharsis/review of pertinent events in supportive environment, Iconic (affirming the presence of God/Higher Power), Initial/Spiritual assessment, patient floor, Normalization of emotional/spiritual concerns, Prayer (assurance of)           Plan of Care:     [] Support spiritual and/or cultural needs    [] Support AMD and/or advance care planning process      [] Support grieving process   [] Coordinate Rites and/or Rituals    [] Coordination with community clergy   [x] No spiritual needs identified at this time   [] Detailed Plan of Care below (See Comments)  [] Make referral to Music Therapy  [] Make referral to Pet Therapy     [] Make referral to Addiction services  [] Make referral to [facility]  [] Make referral to Spiritual Care Partner  [] No future visits requested        [x] Contact Spiritual Care for further referrals     Comments:  Reviewed chart prior to visit on Med Tele for spiritual assessment. No family/friends present. Patient was curled up in bed, awake but very quiet. He shared that he is feeling okay today. Mr. Tegan Coburn primary support comes from his sister. He has spiritual beliefs that guide him, but does not belong to a Congregational. Provided supportive listening presence and assurance of prayer. Advised of ongoing availability of pastoral support.      VAISHNAVI Norman, [facility], Staff [address] [place] [address] Avenue    [address] Paging Service  [phone]

## 2021-11-10 NOTE — PROGRESS NOTES
Pharmacy Antimicrobial Kinetic Dosing    Indication for Antimicrobials: Sepsis   PMH:  Bacteremia 2021, ICD removal    Current Regimen of Each Antimicrobial:  Vancomycin HD dosing - started  day 6  Zosyn 3.275gm IV q12h HD regimen - started  day 6    Previous Antimicrobial Therapy:    Vancomycin Goal Level:  20     Date Dose & Interval Measured (mcg/mL) Predicted AUC/ZECHARIAH   21 750 after HD 36.6                  Dosing calculator used: none redosing per levels    Significant Positive Cultures:      Blood - streptococcus intermedius -  - Final   Blood - NG - Prelim    Conditions for Dosing Consideration:     Labs:  Recent Labs     11/10/21  0657 21  0822 21  1812   CREA 8.06* 13.00* 11.70*   BUN 33* 61* 56*     Recent Labs     11/10/21  0657 21  0821  1812   WBC 10.0 12.9* 13.8*     Temp (24hrs), Av °F (37.8 °C), Min:99.5 °F (37.5 °C), Max:100.7 °F (38.2 °C)    Creatinine Clearance (mL/min):   HD     Impression/Plan:   Patient now growing S. Intermedius, members of S Anginosus are generally considered true pathogens. Held Vancomycin yesterday due to supra-therapeutic level. Level on Thursday morning  Continue Zosyn  Antimicrobial stop date pending     Pharmacy will follow daily and adjust medications as appropriate for renal function and/or serum levels.     Thank you,  Alain Klinefelter, PHARMD

## 2021-11-10 NOTE — PROGRESS NOTES
Notified primary RN that cardiology needs to be consulted before EVER can be done. Please keep pt NPO p midnight for possible EVER tomorrow.

## 2021-11-10 NOTE — CONSULTS
Vascular Surgery Consult Note  11/10/2021    Subjective:     Radhika Singleton a 42 y. o.  with a hx of ASHD, HTN, HLD, HFrEF, ESRF on home HD, multiple DVTs, HIT, anemia, small bowel necrosis, and GERD.  He is a well-known patient of the practice. He is routinely under the care of Dr. Jeremy Youssef. He is s/p creation of a RUE bovine AVG (now w/o flow) & LUE [place] AVG 9/2020. He presented to [facility] in April of this year and was diagnosed bacteremia after an extensive evaluation including EVER, CT scan, and tag WBC scan were unremarkable he underwent exploration of his LUE AVG on 4/9/2021. Surgical cultures were negative. He was transferred to [facility] for evaluation of his AICD & S-ICD. A EVER there revealed a mobile echodensity 12 x 6 mm attached to the endocardial lead. 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 He continues to have a subcutaneous functioning S-ICD in place that was placed prior to his infection in 2019. CXR this admission shows no evidence of residual AICD generator and confirms a left lateral chest wall S-ICD. While admitted at [facility] he was noted to have \"scattered ill-defined groundglass opacities within the right upper and lower lobes along with some scattered ill-defined nodular opacities. Findings may be due to sequela of a multifocal atypical infection. Recommend repeat CT chest in 3 months to ensure resolution was recommeded. \"  He was discharged on a prolonged course of IV antibxs. He denies tenderness, erythema, or edema of his graft. He presented with fever and leukocytosis. His leukocytosis has resolved and his fever curve has trended down on IV antibxs. His blood cultures on arrival were significant for MSSA in 1/4 BTL. Repeat blood cx 48 hrs later are NGTD The positive bottle is from the right basilic vein. His AVG is on the left. He has a non-functioning right AVG; however, it is bovine. He has a hx of cardiac stents. He is s/p failed renal transplant x2  He has a hx of gallstone pancreatitis and peritonitis secondary to bowel necrosis. He has a hx of IVC filter placement that was removed in 2014. 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)  · ESRF   -Nephrotic syndrome   -s/p failed renal transplant x2 (1992 & 2001)    -home HD 5 days per week   · Anemia of chronic disease   · Hypertension  · Hyperlipidemia  · Coronary artery disease   -stenting 2007   -[facility] 7/22/20 w/ patent stent and no significant disease  · HFrEF-41%  · Ventricular tachycardia   · Recurrent DVT LLE (2001, 2011, & 2012)   -s/p IVC 2011 followed by removal in 2014   -warfarin (NC with multiple [facility] anticoagulation clinic appts in the last two months)  · Left brachial vein DVT (2012)  · Right IJ thrombosis (2012)  · HIT  · Seizure disorder  · Gallstone pancreatitis  · Small bowel necrosis   -s/p resection     Past procedural history in addition to above   · Single chamber ICD implanted in 2009 (RV lead 7120 Durata dual coil)   -with a generator change in 2016.    -RV lead broke 11/2019   -generator and lead were removed 4/2021 for ÓSCAR epidermitis lead endocarditis  · S-ICD: [employer], model A219, Emblem MRI S-ICD (2019)  · Femoral bypass 2012  · Cholecystectomy  · Appendectomy   · Exploratory laparotomy  · Right upper extremity aVF  · Right upper extremity bovine aVG (11/2017)  · Left upper extremity aVG    Family history  COPD: Mother  Stomach cancer: Father  Cancer: Maternal grandmother    Social history  He is a non-smoker.   He denies alcohol use     Home medications  Aspirin EC 81 mg daily  Lipitor 80 mg daily  Calcitriol 0.5 mcg daily   Docusate 100 mg  twice daily as needed  Metoprolol succinate XL 25 mg daily   Zofran 4 mg every 8 hours as needed  Protonix 20 mg daily  Renvela 800 mg 2 tablets twice daily  Lokelma 10 g oral powder daily  Warfarin 2.5 mg daily  PhosLo 667 mg 2 caps 3 times daily     Allergies  Shellfish: Anaphylaxis  Contrast dye: Anaphylaxis  Iodine: Anaphylaxis  Heparin: History of HIT      Review of Systems   Constitutional: Positive for activity change, fatigue and fever. Negative for chills. HENT: Negative for congestion. Eyes: Negative for visual disturbance. Respiratory: Negative for cough and shortness of breath. Cardiovascular: Negative for chest pain and leg swelling. Gastrointestinal: Negative for nausea and vomiting. Endocrine: Negative for polydipsia and polyuria. Genitourinary: Negative. Musculoskeletal: Positive for myalgias. Negative for gait problem. Skin: Negative for color change and wound. Allergic/Immunologic: Negative for immunocompromised state. Neurological: Positive for weakness. Hematological: Negative. Psychiatric/Behavioral: Negative. Objective:     Patient Vitals for the past 24 hrs:   BP Temp Pulse Resp SpO2   11/10/21 1041 105/63 98.8 °F (37.1 °C) 63 18 97 %   11/10/21 0742 116/70 99.5 °F (37.5 °C) 87 18 99 %   11/10/21 0350 111/75 99.8 °F (37.7 °C) 95 18 97 %   11/09/21 2321 (!) 106/58 (!) 100.7 °F (38.2 °C) 96 16 95 %   11/09/21 1233 116/70  100     11/09/21 1143 136/81  95     11/09/21 1130 118/65  90     11/09/21 1115 131/72  84     11/09/21 1100 123/77  80          Physical Exam  Constitutional:       Comments: Chronically ill-appearing  male   HENT:      Head:      Comments: Moon face     Nose: Nose normal.      Mouth/Throat:      Mouth: Mucous membranes are moist.   Eyes:      Pupils: Pupils are equal, round, and reactive to light. Cardiovascular:      Rate and Rhythm: Normal rate and regular rhythm. Pulses:           Radial pulses are 2+ on the left side. Comments: Left upper extremity a with palpable thrill. No erythema or edema noted. Nontender. VG  Pulmonary:      Effort: Pulmonary effort is normal. No respiratory distress.    Abdominal:      General: Abdomen is flat. There is no distension. Musculoskeletal:         General: Normal range of motion. Cervical back: Normal range of motion. Skin:     General: Skin is warm. Neurological:      General: No focal deficit present. Mental Status: Mental status is at baseline. Psychiatric:      Comments: Flat affect       Pertinent Test Results:   Recent Results (from the past 24 hour(s))   PROTHROMBIN TIME + INR    Collection Time: 11/10/21  6:57 AM   Result Value Ref Range    INR 2.1 (H) 0.9 - 1.1      Prothrombin time 21.1 (H) 9.0 - 11.1 sec   CBC W/O DIFF    Collection Time: 11/10/21  6:57 AM   Result Value Ref Range    WBC 10.0 4.1 - 11.1 K/uL    RBC 2.37 (L) 4.10 - 5.70 M/uL    HGB 7.2 (L) 12.1 - 17.0 g/dL    HCT 22.3 (L) 36.6 - 50.3 %    MCV 94.1 80.0 - 99.0 FL    MCH 30.4 26.0 - 34.0 PG    MCHC 32.3 30.0 - 36.5 g/dL    RDW 18.2 (H) 11.5 - 14.5 %    PLATELET 423 (L) 852 - 400 K/uL    MPV 11.2 8.9 - 12.9 FL    NRBC 0.0 0  WBC    ABSOLUTE NRBC 0.00 0.00 - 9.92 K/uL   METABOLIC PANEL, BASIC    Collection Time: 11/10/21  6:57 AM   Result Value Ref Range    Sodium 134 (L) 136 - 145 mmol/L    Potassium 4.1 3.5 - 5.1 mmol/L    Chloride 97 97 - 108 mmol/L    CO2 26 21 - 32 mmol/L    Anion gap 11 5 - 15 mmol/L    Glucose 86 65 - 100 mg/dL    BUN 33 (H) 6 - 20 MG/DL    Creatinine 8.06 (H) 0.70 - 1.30 MG/DL    BUN/Creatinine ratio 4 (L) 12 - 20      GFR est AA 9 (L) >60 ml/min/1.73m2    GFR est non-AA 7 (L) >60 ml/min/1.73m2    Calcium 8.7 8.5 - 10.1 MG/DL       Assessmen/Plan:     Sepsis  Recurrent staph bacteremia  -History of MRSE epidermis lead endocarditis (4/2021). Status post removal of nonfunctioning AICD (4/22/2021). Residual functioning subcutaneous ICD (placed 2019). -History of multilobular groundglass opacities of the lungs (4/2021)  -History of gallstone pancreatitis  -History of bowel necrosis with peritonitis  · No evidence of acute infection of the left upper extremity aVG.   No role for procedural investigation. Previous cultures have been no growth. No clinical evidence of infection. White blood cell tag scan is unremarkable. Patient has limited options for hemodialysis especially considering his age. He would benefit from suppressive therapy prior to removal of graft and is currently not clear that the graft is infected. · Consider CT of the chest as he has previously documented history of possible atypical pneumonia with follow-up CT in 3 months recommended. Defer to primary team.  · Defer further evaluation to infectious disease and electrophysiology. · Is patient a poor candidate for home hemodialysis with a history of recurrent staph infections? ??    ESRF  -Nephrotic syndrome  -s/p failed renal transplant x2 (1992 & 2001)   -home HD 5 days per week   Anemia of chronic disease   · Plan per nephrology    Hypertension  Hyperlipidemia  Coronary artery disease  -stenting 2007  -[facility] 7/22/20 w/ patent stent and no significant disease  HFrEF-41%  History of ventricular tachycardia     Seizure disorder     VTE Prophylaxis:  History of recurrent left lower extremity DVT  History of left brachial vein DVT  History of right IJ thrombosis  History of medical noncompliance with anticoagulation clinic  History of HIT  -Warfarin: Therapeutic arrival    Disposition:  Home     Signed By: Branden Lipscomb NP     November 10, 2021

## 2021-11-10 NOTE — PROGRESS NOTES
Infectious Disease Progress Note         Interval:  Intermittent fevers    Subjective:   Reports feeling about the same. Reports generalized back pain and body aches were improving yesterday, but have come again since the WBC scan yesterday. Still has left sided abdominal pain. Objective:    Vitals:   Reviewed in chart. Physical Exam:  ? GEN: NAD, tired-appearing. ? HEENT: Normocephalic, atraumatic, PERRL, no scleral icterus, multiple teeth missing. ? CV: S1, S2 heard regularly, thrills from AVG heard, left chest ICD no inflammation or tenderness  ? Lungs: Clear to auscultation bilaterally  ? Abdomen: soft, tender in LLQ  ? Genitourinary:  no oneal  ? Extremities: no edema  ? Neuro: Alert, oriented to time, place and situation, moves all extremities to commands, verbal   ? Skin: no rash  ? Psych: good affect, good eye contact, non tearful   ? Lines: left arm AVG is swollen and warm. Non-tender. Has a good thrill.            Labs:  Recent Results (from the past 24 hour(s))   PROTHROMBIN TIME + INR    Collection Time: 11/10/21  6:57 AM   Result Value Ref Range    INR 2.1 (H) 0.9 - 1.1      Prothrombin time 21.1 (H) 9.0 - 11.1 sec   CBC W/O DIFF    Collection Time: 11/10/21  6:57 AM   Result Value Ref Range    WBC 10.0 4.1 - 11.1 K/uL    RBC 2.37 (L) 4.10 - 5.70 M/uL    HGB 7.2 (L) 12.1 - 17.0 g/dL    HCT 22.3 (L) 36.6 - 50.3 %    MCV 94.1 80.0 - 99.0 FL    MCH 30.4 26.0 - 34.0 PG    MCHC 32.3 30.0 - 36.5 g/dL    RDW 18.2 (H) 11.5 - 14.5 %    PLATELET 276 (L) 923 - 400 K/uL    MPV 11.2 8.9 - 12.9 FL    NRBC 0.0 0  WBC    ABSOLUTE NRBC 0.00 0.00 - 4.34 K/uL   METABOLIC PANEL, BASIC    Collection Time: 11/10/21  6:57 AM   Result Value Ref Range    Sodium 134 (L) 136 - 145 mmol/L    Potassium 4.1 3.5 - 5.1 mmol/L    Chloride 97 97 - 108 mmol/L    CO2 26 21 - 32 mmol/L    Anion gap 11 5 - 15 mmol/L    Glucose 86 65 - 100 mg/dL    BUN 33 (H) 6 - 20 MG/DL    Creatinine 8.06 (H) 0.70 - 1.30 MG/DL BUN/Creatinine ratio 4 (L) 12 - 20      GFR est AA 9 (L) >60 ml/min/1.73m2    GFR est non-AA 7 (L) >60 ml/min/1.73m2    Calcium 8.7 8.5 - 10.1 MG/DL             Assessment:  42y.o. year old male with history of ESRD on iHD since 2012, 2 failed renal transplants due to graft-medication non-compliance (last transplant in 2001) CAD with MI with 2 AICD placements (2009 - non-functioning due to fractured lead, then in 2019), MSSA bacteremia in 2017, MRSE bacteremia 10/2020 and possible L1-L2 discitis/osteomyelitis treated with 6 weeks of vancomycin, high-grade MRSE bacteremia 04/2020 s/p vascular ICD removal at [facility] on 5/22/21 followed by ~4.5 weeks of vancomycin from ICD removal.  Patient has the nonendovascular ICD in his left chest from before. This was not removed at [facility] because the suspicion for involvement of this ICD from the infection was low. Also has hx of H/o DVT, s/p ivc filter/ h/o HIT.     This admission:  - Sepsis from Streptococcus intermedius bacteremia Pen G ZECHARIAH <0.06ug/ml) . Symptoms ongoing for ~6 days PTA. Per exam, suspicion is high for AVG involvement. Left chest ICD is non-endovascular. Hence, not at high risk for infectious involvement from bloodstream infections.            Recommendations:  - Tagged WBC study negative. This does not rule out graft infection.   - Doppler US ordered for arm graft for any hematoma/abscess/stenosis evaluation. Vascular surgery consulted as well. - Obtain EVER - relayed to hospitalist.  - If ongoing LLQ pain. Obtain repeat CT abdomen-pelvis to evaluate for any splenic infarcts/abscesses. - Patient is reporting dental issues recently (broken teeth). No recent dental care. Obtain panorex. - Patient reports IVC filter has been removed several years ago (placed for LLE DVT). - Left chest ICD is non-endovascular. Hence, not at high risk for infectious involvement from bloodstream infections.  On exam, there is no signs of inflammation or pocket-site involvement seen. Ultrasound ordered to evaluate (spoke to [address]). - Stop vanc zosyn. Started ceftriaxone 2gm daily. - Any fever >101F, must please obtain repeat blood cultures if feasible.          Will follow. Thank you for the opportunity to participate in the care of this patient. Please contact with questions or concerns.       Valentín Adams MD  Infectious Diseases

## 2021-11-10 NOTE — PROGRESS NOTES
Nephrology Progress Note  Blaise Leblanc     [facility]. [facility]  Phone - [phone]   Patient: Nadia Slater    YOB: 1975        Date- 11/10/2021   Admit Date: 11/5/2021  CC: Follow up for  ESRD          IMPRESSION & PLAN:   · End-stage renal disease home HD 5 days per week- Follows up with Dr Elijah Perea at [facility]  · Failed RTX times 2  · Hyperkalemia  · Gram positive sepsis with lead extraction at [facility]  · Anemia of ckd  · Hx of renal tx in past times 2.  · Hypertension  · CAD         H/o DVT, s/p ivc filter/ h/o HIT     PLAN-   Plan for HD tomorrow   Will keep him on TTS schedule for now   Tagged WBC scan was negative for source of infection,ID ordered for AVG US.   Will also need EVER   Appreciate ID input   Will follow with you. Subjective: Interval History:   -Seen and examined today  -tagged WBC scan negative      Objective:   Vitals:    11/09/21 2321 11/10/21 0350 11/10/21 0742 11/10/21 1041   BP: (!) 106/58 111/75 116/70 105/63   Pulse: 96 95 87 63   Resp: 16 18 18 18   Temp: (!) 100.7 °F (38.2 °C) 99.8 °F (37.7 °C) 99.5 °F (37.5 °C) 98.8 °F (37.1 °C)   TempSrc:       SpO2: 95% 97% 99% 97%   Weight:       Height:          11/09 0701 - 11/10 0700  In: 100 [I.V.:100]  Out: 2000   Last 3 Recorded Weights in this Encounter    11/07/21 0058 11/07/21 1515 11/07/21 2137   Weight: 69.2 kg (152 lb 9.6 oz) 68.9 kg (152 lb) 64.8 kg (142 lb 12.8 oz)      Physical exam:   GEN: NAD  NECK- Supple, no mass  RESP: No wheezing, Clear b/l  CVS: S1,S2  RRR  NEURO: Normal speech, Non focal  EXT: No Edema   PSYCH: Normal Mood  HD acess; R AVG    Chart reviewed. Pertinent Notes reviewed.      Data Review :  Recent Labs     11/10/21  0657 11/09/21  0822 11/08/21  1812   * 132* 131*   K 4.1 4.5 4.2   CL 97 96* 94*   CO2 26 21 24   BUN 33* 61* 56*   CREA 8.06* 13.00* 11.70*   GLU 86 77 96   CA 8.7 8.6 9.4   PHOS  --  5.7*  --      Recent Labs     11/10/21  0657 11/09/21  0822 11/08/21  1812   WBC 10.0 12.9* 13.8*   HGB 7.2* 7.2* 8.0*   HCT 22.3* 21.7* 23.6*   * 156 166     No results for input(s): FE, TIBC, PSAT, FERR in the last 72 hours.    Medication list  reviewed  Current Facility-Administered Medications   Medication Dose Route Frequency    [START ON 11/11/2021] Vancomycin Level  1 Each Other ONCE    warfarin (COUMADIN) tablet 2.5 mg  2.5 mg Oral ONCE    cefTRIAXone (ROCEPHIN) 2 g in 0.9% sodium chloride (MBP/ADV) 50 mL MBP  2 g IntraVENous Q24H    0.9% sodium chloride infusion 250 mL  250 mL IntraVENous PRN    oxyCODONE IR (ROXICODONE) tablet 5 mg  5 mg Oral Q4H PRN    HYDROmorphone (DILAUDID) injection 1 mg  1 mg IntraVENous Q4H PRN    WARFARIN INFORMATION NOTE (COUMADIN)   Other QPM    aspirin chewable tablet 81 mg  81 mg Oral DAILY    calcitRIOL (ROCALTROL) capsule 0.5 mcg  0.5 mcg Oral DAILY    atorvastatin (LIPITOR) tablet 20 mg  20 mg Oral DAILY    calcium acetate(phosphat bind) (PHOSLO) capsule 1,334 mg  2 Capsule Oral TID WITH MEALS    metoprolol succinate (TOPROL-XL) XL tablet 12.5 mg  12.5 mg Oral DAILY    pantoprazole (PROTONIX) tablet 40 mg  40 mg Oral ACB    VANCOMYCIN INFORMATION NOTE   Other Rx Dosing/Monitoring    sodium chloride (NS) flush 5-40 mL  5-40 mL IntraVENous Q8H    sodium chloride (NS) flush 5-40 mL  5-40 mL IntraVENous PRN    acetaminophen (TYLENOL) tablet 650 mg  650 mg Oral Q6H PRN    Or    acetaminophen (TYLENOL) suppository 650 mg  650 mg Rectal Q6H PRN    polyethylene glycol (MIRALAX) packet 17 g  17 g Oral DAILY PRN    ondansetron (ZOFRAN ODT) tablet 4 mg  4 mg Oral Q8H PRN    Or    ondansetron (ZOFRAN) injection 4 mg  4 mg IntraVENous Q6H PRN          Destiny Solorzano MD              [facility]  [facility] / [facility] [employer] [facility], [place], [address]  Phone - [phone]               Fax - [phone]

## 2021-11-10 NOTE — ROUTINE PROCESS
Bedside shift change report given to Shaan Santos (oncoming nurse) by Rodrigo SPEAR RN (offgoing nurse). Report included the following information SBAR, Kardex and MAR.

## 2021-11-11 ENCOUNTER — APPOINTMENT (OUTPATIENT)
Dept: NON INVASIVE DIAGNOSTICS | Age: 46
DRG: 853 | End: 2021-11-11
Attending: INTERNAL MEDICINE
Payer: MEDICARE

## 2021-11-11 LAB
ANION GAP SERPL CALC-SCNC: 9 MMOL/L (ref 5–15)
BASOPHILS # BLD: 0 K/UL (ref 0–0.1)
BASOPHILS NFR BLD: 0 % (ref 0–1)
BUN SERPL-MCNC: 41 MG/DL (ref 6–20)
BUN/CREAT SERPL: 4 (ref 12–20)
CALCIUM SERPL-MCNC: 9.1 MG/DL (ref 8.5–10.1)
CHLORIDE SERPL-SCNC: 96 MMOL/L (ref 97–108)
CO2 SERPL-SCNC: 25 MMOL/L (ref 21–32)
CREAT SERPL-MCNC: 9.35 MG/DL (ref 0.7–1.3)
DIFFERENTIAL METHOD BLD: ABNORMAL
ECHO MV MAX VELOCITY: 113.21 CM/S
ECHO MV MEAN GRADIENT: 1.76 MMHG
ECHO MV PEAK GRADIENT: 5.13 MMHG
ECHO MV VTI: 13.65 CM
ECHO TV REGURGITANT MAX VELOCITY: 382.9 CM/S
ECHO TV REGURGITANT PEAK GRADIENT: 58.64 MMHG
EOSINOPHIL # BLD: 0.3 K/UL (ref 0–0.4)
EOSINOPHIL NFR BLD: 3 % (ref 0–7)
ERYTHROCYTE [DISTWIDTH] IN BLOOD BY AUTOMATED COUNT: 18 % (ref 11.5–14.5)
GLUCOSE SERPL-MCNC: 88 MG/DL (ref 65–100)
HBV CORE IGM SER QL: NONREACTIVE
HBV SURFACE AB SER QL: REACTIVE
HBV SURFACE AB SER-ACNC: 904.4 MIU/ML
HBV SURFACE AG SER QL: 0.39 INDEX
HBV SURFACE AG SER QL: NEGATIVE
HCT VFR BLD AUTO: 22.5 % (ref 36.6–50.3)
HGB BLD-MCNC: 7.1 G/DL (ref 12.1–17)
IMM GRANULOCYTES # BLD AUTO: 0 K/UL (ref 0–0.04)
IMM GRANULOCYTES NFR BLD AUTO: 0 % (ref 0–0.5)
INR PPP: 2.1 (ref 0.9–1.1)
LYMPHOCYTES # BLD: 0.8 K/UL (ref 0.8–3.5)
LYMPHOCYTES NFR BLD: 8 % (ref 12–49)
MAGNESIUM SERPL-MCNC: 2.2 MG/DL (ref 1.6–2.4)
MCH RBC QN AUTO: 29.7 PG (ref 26–34)
MCHC RBC AUTO-ENTMCNC: 31.6 G/DL (ref 30–36.5)
MCV RBC AUTO: 94.1 FL (ref 80–99)
MONOCYTES # BLD: 0.7 K/UL (ref 0–1)
MONOCYTES NFR BLD: 6 % (ref 5–13)
NEUTS SEG # BLD: 9.2 K/UL (ref 1.8–8)
NEUTS SEG NFR BLD: 83 % (ref 32–75)
NRBC # BLD: 0 K/UL (ref 0–0.01)
NRBC BLD-RTO: 0 PER 100 WBC
PHOSPHATE SERPL-MCNC: 3.9 MG/DL (ref 2.6–4.7)
PLATELET # BLD AUTO: 148 K/UL (ref 150–400)
PMV BLD AUTO: 10.9 FL (ref 8.9–12.9)
POTASSIUM SERPL-SCNC: 4.4 MMOL/L (ref 3.5–5.1)
PROTHROMBIN TIME: 21.5 SEC (ref 9–11.1)
RBC # BLD AUTO: 2.39 M/UL (ref 4.1–5.7)
SODIUM SERPL-SCNC: 130 MMOL/L (ref 136–145)
VANCOMYCIN SERPL-MCNC: 20.5 UG/ML
WBC # BLD AUTO: 11 K/UL (ref 4.1–11.1)

## 2021-11-11 PROCEDURE — 74011250637 HC RX REV CODE- 250/637: Performed by: STUDENT IN AN ORGANIZED HEALTH CARE EDUCATION/TRAINING PROGRAM

## 2021-11-11 PROCEDURE — 74011250636 HC RX REV CODE- 250/636: Performed by: STUDENT IN AN ORGANIZED HEALTH CARE EDUCATION/TRAINING PROGRAM

## 2021-11-11 PROCEDURE — 74011000250 HC RX REV CODE- 250: Performed by: INTERNAL MEDICINE

## 2021-11-11 PROCEDURE — 86706 HEP B SURFACE ANTIBODY: CPT

## 2021-11-11 PROCEDURE — 80202 ASSAY OF VANCOMYCIN: CPT

## 2021-11-11 PROCEDURE — 85610 PROTHROMBIN TIME: CPT

## 2021-11-11 PROCEDURE — 36415 COLL VENOUS BLD VENIPUNCTURE: CPT

## 2021-11-11 PROCEDURE — 86705 HEP B CORE ANTIBODY IGM: CPT

## 2021-11-11 PROCEDURE — 99152 MOD SED SAME PHYS/QHP 5/>YRS: CPT

## 2021-11-11 PROCEDURE — 74011250636 HC RX REV CODE- 250/636: Performed by: INTERNAL MEDICINE

## 2021-11-11 PROCEDURE — 87340 HEPATITIS B SURFACE AG IA: CPT

## 2021-11-11 PROCEDURE — 65270000029 HC RM PRIVATE

## 2021-11-11 PROCEDURE — 80048 BASIC METABOLIC PNL TOTAL CA: CPT

## 2021-11-11 PROCEDURE — 84100 ASSAY OF PHOSPHORUS: CPT

## 2021-11-11 PROCEDURE — 93312 ECHO TRANSESOPHAGEAL: CPT

## 2021-11-11 PROCEDURE — 90935 HEMODIALYSIS ONE EVALUATION: CPT

## 2021-11-11 PROCEDURE — 99233 SBSQ HOSP IP/OBS HIGH 50: CPT | Performed by: STUDENT IN AN ORGANIZED HEALTH CARE EDUCATION/TRAINING PROGRAM

## 2021-11-11 PROCEDURE — 85025 COMPLETE CBC W/AUTO DIFF WBC: CPT

## 2021-11-11 PROCEDURE — 83735 ASSAY OF MAGNESIUM: CPT

## 2021-11-11 PROCEDURE — B24BZZ4 ULTRASONOGRAPHY OF HEART WITH AORTA, TRANSESOPHAGEAL: ICD-10-PCS | Performed by: INTERNAL MEDICINE

## 2021-11-11 RX ORDER — FENTANYL CITRATE 50 UG/ML
12.5-5 INJECTION, SOLUTION INTRAMUSCULAR; INTRAVENOUS
Status: DISCONTINUED | OUTPATIENT
Start: 2021-11-11 | End: 2021-11-11

## 2021-11-11 RX ORDER — LIDOCAINE HYDROCHLORIDE 20 MG/ML
15 SOLUTION OROPHARYNGEAL AS NEEDED
Status: DISCONTINUED | OUTPATIENT
Start: 2021-11-11 | End: 2021-11-11

## 2021-11-11 RX ORDER — MIDAZOLAM HYDROCHLORIDE 1 MG/ML
.5-2 INJECTION, SOLUTION INTRAMUSCULAR; INTRAVENOUS
Status: DISCONTINUED | OUTPATIENT
Start: 2021-11-11 | End: 2021-11-11

## 2021-11-11 RX ADMIN — FENTANYL CITRATE 25 MCG: 50 INJECTION, SOLUTION INTRAMUSCULAR; INTRAVENOUS at 08:18

## 2021-11-11 RX ADMIN — ASPIRIN 81 MG: 81 TABLET, CHEWABLE ORAL at 11:04

## 2021-11-11 RX ADMIN — ACETAMINOPHEN 650 MG: 325 TABLET ORAL at 20:12

## 2021-11-11 RX ADMIN — CALCITRIOL CAPSULES 0.25 MCG 0.5 MCG: 0.25 CAPSULE ORAL at 11:04

## 2021-11-11 RX ADMIN — BENZOCAINE, BUTAMBEN, AND TETRACAINE HYDROCHLORIDE 1 SPRAY: .028; .004; .004 AEROSOL, SPRAY TOPICAL at 08:13

## 2021-11-11 RX ADMIN — LIDOCAINE HYDROCHLORIDE 15 ML: 20 SOLUTION OROPHARYNGEAL at 08:13

## 2021-11-11 RX ADMIN — Medication 10 ML: at 14:26

## 2021-11-11 RX ADMIN — OXYCODONE 5 MG: 5 TABLET ORAL at 17:38

## 2021-11-11 RX ADMIN — Medication 5 ML: at 05:32

## 2021-11-11 RX ADMIN — METOPROLOL SUCCINATE 12.5 MG: 25 TABLET, EXTENDED RELEASE ORAL at 11:05

## 2021-11-11 RX ADMIN — HYDROMORPHONE HYDROCHLORIDE 1 MG: 1 INJECTION, SOLUTION INTRAMUSCULAR; INTRAVENOUS; SUBCUTANEOUS at 01:12

## 2021-11-11 RX ADMIN — HYDROMORPHONE HYDROCHLORIDE 1 MG: 1 INJECTION, SOLUTION INTRAMUSCULAR; INTRAVENOUS; SUBCUTANEOUS at 09:31

## 2021-11-11 RX ADMIN — Medication 5 ML: at 21:02

## 2021-11-11 RX ADMIN — MIDAZOLAM 1 MG: 1 INJECTION INTRAMUSCULAR; INTRAVENOUS at 08:21

## 2021-11-11 RX ADMIN — HYDROMORPHONE HYDROCHLORIDE 1 MG: 1 INJECTION, SOLUTION INTRAMUSCULAR; INTRAVENOUS; SUBCUTANEOUS at 23:13

## 2021-11-11 RX ADMIN — HYDROMORPHONE HYDROCHLORIDE 1 MG: 1 INJECTION, SOLUTION INTRAMUSCULAR; INTRAVENOUS; SUBCUTANEOUS at 05:31

## 2021-11-11 RX ADMIN — HYDROMORPHONE HYDROCHLORIDE 1 MG: 1 INJECTION, SOLUTION INTRAMUSCULAR; INTRAVENOUS; SUBCUTANEOUS at 14:26

## 2021-11-11 RX ADMIN — CALCIUM ACETATE 1334 MG: 667 CAPSULE ORAL at 11:03

## 2021-11-11 RX ADMIN — OXYCODONE 5 MG: 5 TABLET ORAL at 21:02

## 2021-11-11 RX ADMIN — MIDAZOLAM 1 MG: 1 INJECTION INTRAMUSCULAR; INTRAVENOUS at 08:18

## 2021-11-11 RX ADMIN — ATORVASTATIN CALCIUM 20 MG: 20 TABLET, FILM COATED ORAL at 11:04

## 2021-11-11 RX ADMIN — HYDROMORPHONE HYDROCHLORIDE 1 MG: 1 INJECTION, SOLUTION INTRAMUSCULAR; INTRAVENOUS; SUBCUTANEOUS at 18:55

## 2021-11-11 RX ADMIN — CALCIUM ACETATE 1334 MG: 667 CAPSULE ORAL at 17:38

## 2021-11-11 RX ADMIN — PANTOPRAZOLE SODIUM 40 MG: 40 TABLET, DELAYED RELEASE ORAL at 11:05

## 2021-11-11 NOTE — PROGRESS NOTES
Infectious Disease Progress Note         Interval:  Improving fever curve      Subjective:   Reports feeling slightly better. No chills or night sweats last night. No pain at the graft (though still appears a bit swollen at the access area to the patient). Objective:    Vitals:   Reviewed in chart. Physical Exam:  ? GEN: NAD, tired-appearing. ? HEENT: Normocephalic, atraumatic, PERRL, no scleral icterus, multiple teeth missing. ? CV: S1, S2 heard regularly, thrills from AVG heard, left chest ICD no inflammation or tenderness  ? Lungs: Clear to auscultation bilaterally  ? Abdomen: soft  ? Genitourinary:  no oneal  ? Extremities: no edema  ? Neuro: Alert, oriented to time, place and situation, moves all extremities to commands, verbal   ? Skin: no rash  ? Psych: good affect, good eye contact, non tearful   ? Lines: left arm AVG is swollen and warm. Non-tender. Has a good thrill. Labs:  Recent Results (from the past 24 hour(s))   VANCOMYCIN, RANDOM    Collection Time: 11/11/21  5:40 AM   Result Value Ref Range    Vancomycin, random 20.5 UG/ML   CBC WITH AUTOMATED DIFF    Collection Time: 11/11/21  5:40 AM   Result Value Ref Range    WBC 11.0 4.1 - 11.1 K/uL    RBC 2.39 (L) 4.10 - 5.70 M/uL    HGB 7.1 (L) 12.1 - 17.0 g/dL    HCT 22.5 (L) 36.6 - 50.3 %    MCV 94.1 80.0 - 99.0 FL    MCH 29.7 26.0 - 34.0 PG    MCHC 31.6 30.0 - 36.5 g/dL    RDW 18.0 (H) 11.5 - 14.5 %    PLATELET 708 (L) 710 - 400 K/uL    MPV 10.9 8.9 - 12.9 FL    NRBC 0.0 0  WBC    ABSOLUTE NRBC 0.00 0.00 - 0.01 K/uL    NEUTROPHILS 83 (H) 32 - 75 %    LYMPHOCYTES 8 (L) 12 - 49 %    MONOCYTES 6 5 - 13 %    EOSINOPHILS 3 0 - 7 %    BASOPHILS 0 0 - 1 %    IMMATURE GRANULOCYTES 0 0.0 - 0.5 %    ABS. NEUTROPHILS 9.2 (H) 1.8 - 8.0 K/UL    ABS. LYMPHOCYTES 0.8 0.8 - 3.5 K/UL    ABS. MONOCYTES 0.7 0.0 - 1.0 K/UL    ABS. EOSINOPHILS 0.3 0.0 - 0.4 K/UL    ABS. BASOPHILS 0.0 0.0 - 0.1 K/UL    ABS. IMM.  GRANS. 0.0 0.00 - 0.04 K/UL DF AUTOMATED     METABOLIC PANEL, BASIC    Collection Time: 11/11/21  5:40 AM   Result Value Ref Range    Sodium 130 (L) 136 - 145 mmol/L    Potassium 4.4 3.5 - 5.1 mmol/L    Chloride 96 (L) 97 - 108 mmol/L    CO2 25 21 - 32 mmol/L    Anion gap 9 5 - 15 mmol/L    Glucose 88 65 - 100 mg/dL    BUN 41 (H) 6 - 20 MG/DL    Creatinine 9.35 (H) 0.70 - 1.30 MG/DL    BUN/Creatinine ratio 4 (L) 12 - 20      GFR est AA 7 (L) >60 ml/min/1.73m2    GFR est non-AA 6 (L) >60 ml/min/1.73m2    Calcium 9.1 8.5 - 10.1 MG/DL   MAGNESIUM    Collection Time: 11/11/21  5:40 AM   Result Value Ref Range    Magnesium 2.2 1.6 - 2.4 mg/dL   PHOSPHORUS    Collection Time: 11/11/21  5:40 AM   Result Value Ref Range    Phosphorus 3.9 2.6 - 4.7 MG/DL   ECHO EVER W OR WO CONTRAST    Collection Time: 11/11/21  8:43 AM   Result Value Ref Range    MV Peak Gradient 5.13 mmHg    MV Mean Gradient 1.76 mmHg    Mitral Valve Max Velocity 113.21 cm/s    Mitral Valve Annulus Velocity Time Integral 13.65 cm    Triscuspid Valve Regurgitation Peak Gradient 58.64 mmHg    TR Max Velocity 382.90 cm/s             Assessment:  42y.o. 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  Patient has the nonendovascular ICD in his left chest from before. This was not removed at [address] [place] [address] because the suspicion for involvement of this ICD from the infection was low. Also has hx of H/o DVT, s/p ivc filter/ h/o HIT.     This admission:  - Sepsis from Streptococcus intermedius bacteremia Pen G ZECHARIAH <0.06ug/ml) .  Symptoms ongoing for ~6 days PTA. Per exam, suspicion is high for AVG involvement. Left chest ICD is non-endovascular. Hence, not at high risk for infectious involvement from bloodstream infections. EVER done 11/11/21. Left chest US no fluid collections at the ICD. No focal suspicion seen for infectious involvement currently.        Recommendations:  - CT maxillofacial showing multiple periapical abscess - difficult to exclude. Will need inpatient dental/oral surgery evaluation - Dr. Leighton Turpin. Relayed to Dr. Gilbert Smith. These are likely the source of this specific bacteremia with Strep intermedius (viridans). - Will discuss graft US findings with Dr. Starr Valerio  - EVER results pending.   -  Left chest ICD is non-endovascular. Hence, not at high risk for infectious involvement from bloodstream infections. On exam, there is no signs of inflammation or pocket-site involvement seen. Left chest US no fluid collections at the ICD. No focal suspicion seen for infectious involvement currently. - Repeat blood cultures from 11/7/21 negative so far. - Continue ceftriaxone 2gm daily. - Any fever >101F, must please obtain repeat blood cultures if feasible.   - Duration of abx pending clinical course, atleast 4 weeks. Will follow. Thank you for the opportunity to participate in the care of this patient. Please contact with questions or concerns.       Bunny Sharma MD  Infectious Diseases

## 2021-11-11 NOTE — PROGRESS NOTES
Hospitalist Progress Note    NAME: Tommie Riojas   :  1975   MRN:  [id number]   Room Number:  2134/01  @ [facility]       Interim Hospital Summary: 42 y.o. male whom presented on 2021 with      Assessment / Plan:         Estimated discharge date: 11/15  Barriers: Clinical improvement, treatment of bacteremia           Coag negative staph aureus bacteremia 1/4  Streptococcus intermedius bacteremia 4/4 bottles  Sepsis  History of AV graft  Presented with generalized body ache, nausea, vomiting. Sepsis indicatorstachycardia, tachypnea, fever. CT abdomen/pelvisno acute abnormality. Chest x-rayNo focal airspace process. Mild cardiomegaly with probable pulmonary  arterial enlargement. Has a similar presentation in 2021 when the patient has persistent bacteremia and got ICD removed. TTE showed mitral valve thickening. tagged WBC Scan did not show abnormal uptake. US chest did not show localized fluid collection. Duplex of HD AVG did not show evidence of infection. CT Maxillofacial   Revealed possible periapical dental abscesses in multiple teeth.       -Follow Blood Cx   -Abx switched to Ceftriaxone   - ID following  - EVER results pending.   - ID, vascular surgery following  - Per ID, consult Dr. Maico Murrieta, [facility]     End-stage renal disease on dialysis POA  Follows up with Dr Mitali Vidal at [address]  Nephrology following on TTS schedule. Continue with the PhosLo and calcitriol.        Acute on chronic anemia  -S/p 1 unit of PRBC. Hemoglobin is 7.2     History of DVT with graft thrombosis  -Continue Coumadin. Pharmacy managing warfarin.   INR is 1.9.     Hypertension  GERD  CAD  History of DVT and graft thromboses on Coumadin  History of HIT  ICMP s/p ICP placement  Continue metoprolol, PPI, aspirin, Lipitor  Pharmacy managing Coumadin dosing  -INR within goal     18.5 - 24.9 Normal weight / Body mass index is 22.37 kg/m².     Estimated discharge date: 11/15  Barriers: Clinical improvement, treatment of bacteremia        Code Status: Full code  Surrogate Decision Maker:     DVT Prophylaxis: Coumadin  GI Prophylaxis: not indicated     Baseline: Ambulatory at home        Subjective:     Chief Complaint / Reason for Physician Visit  \"I feel ok, I feel like I keep coming back with the same thing every 3-4 months\". Discussed with RN events overnight. Review of Systems:  No fevers, chills, appetite change, cough, sputum production, shortness of breath, dyspnea on exertion, nausea, vomitting, diarrhea, constipation, chest pain, leg edema, abdominal pain, joint pain, rash, itching. Tolerating PT/OT. Tolerating diet. Objective:     VITALS:   Last 24hrs VS reviewed since prior progress note. Most recent are:  Patient Vitals for the past 24 hrs:   Temp Pulse Resp BP SpO2   11/11/21 0845  95 18 107/70 97 %   11/11/21 0840  93 27 111/69 95 %   11/11/21 0835  96 19 113/74 98 %   11/11/21 0830  89 18 100/61 100 %   11/11/21 0825  93 15 110/67 98 %   11/11/21 0820  95 20 120/73 100 %   11/11/21 0815  89 15 118/72 100 %   11/11/21 0810  90 18 120/71 99 %   11/11/21 0800  86 12 118/78 100 %   11/11/21 0736  85 17 114/69 100 %   11/11/21 0112 98.7 °F (37.1 °C) 91 18 120/72 100 %   11/10/21 1950 98.4 °F (36.9 °C) 86 18 108/69 100 %   11/10/21 1453 98.5 °F (36.9 °C) 68 18 115/68 100 %   11/10/21 1041 98.8 °F (37.1 °C) 63 18 105/63 97 %     No intake or output data in the 24 hours ending 11/11/21 0853     PHYSICAL EXAM:  General: Alert, cooperative, no acute distress    EENT:  EOMI. Anicteric sclerae. MMM  Resp:  CTA bilaterally, no wheezing or rales. No accessory muscle use  CV:  Regular  rhythm,  normal S1/S2, no murmurs rubs gallops, No edema  GI:  Soft, Non distended, Non tender. +Bowel sounds  Neurologic:  Alert and oriented X 3, normal speech,   Psych:   Good insight. Not anxious nor agitated  Skin:  No rashes.   No jaundice    Reviewed most current lab test results and cultures  YES  Reviewed most current radiology test results   YES  Review and summation of old records today    NO  Reviewed patient's current orders and MAR    YES  PMH/SH reviewed - no change compared to H&P  ________________________________________________________________________  Care Plan discussed with:    Comments   Patient x    Family      RN x    Care Manager x    Consultant                       x Multidiciplinary team rounds were held today with , nursing, pharmacist and clinical coordinator. Patient's plan of care was discussed; medications were reviewed and discharge planning was addressed. ________________________________________________________________________  Total NON critical care TIME:  25   Minutes    Total CRITICAL CARE TIME Spent:   Minutes non procedure based      Comments   >50% of visit spent in counseling and coordination of care     ________________________________________________________________________  Niko Conn MD     Procedures: see electronic medical records for all procedures/Xrays and details which were not copied into this note but were reviewed prior to creation of Plan. LABS:  I reviewed today's most current labs and imaging studies.   Pertinent labs include:  Recent Labs     11/11/21  0540 11/10/21  0657 11/09/21  0822   WBC 11.0 10.0 12.9*   HGB 7.1* 7.2* 7.2*   HCT 22.5* 22.3* 21.7*   * 146* 156     Recent Labs     11/11/21  0540 11/10/21  0657 11/09/21  0822 11/09/21  0244 11/08/21  1812 11/08/21 1812   * 134* 132*  --    < > 131*   K 4.4 4.1 4.5  --    < > 4.2   CL 96* 97 96*  --    < > 94*   CO2 25 26 21  --    < > 24   GLU 88 86 77  --    < > 96   BUN 41* 33* 61*  --    < > 56*   CREA 9.35* 8.06* 13.00*  --    < > 11.70*   CA 9.1 8.7 8.6  --    < > 9.4   MG 2.2  --   --   --   --   --    PHOS 3.9  --  5.7*  --   --   --    ALB  --   --  2.2*  --   --   --    INR  --  2.1*  --  1.9*  --  2.0*    < > = values in this interval not displayed.        Signed: Ligia Santoro MD

## 2021-11-11 NOTE — PROGRESS NOTES
Progress Note      11/11/2021 10:42 AM  NAME: Yusuf Thompson   MRN:  [id number]   Admit Diagnosis: Fever [R50.9]                Assessment:       - AWMI with PCI of LAD in 2008  - ICM EF 25%, EVER 3/2021 no signs of endocarditis, TTE 11/2021 EF 35%, sev pHTN  - Hx of ICD in 2009, subsequent lead fracture and device was turned off, s/p [employer] Sub Q ICD, in 2021 Intravenous ICD was extracted due to recurrent bacteremia, EVER 3/2021 with no overt infection, EVER 11/2021 EF 35%, likely calcified ruptured cord attached to papillary muscle of mitral valve, only mild MR, no signs for endocarditis. - Hx of IVC filter due to DVT  - ESRD on HD, failed renal transplant x 2, home HD Dr. Pili Nova  - Anemia  - Atherosclerotic aorta  - Thrombocytopnia, hx of HIT  - Recurrent bacteremia, follows with ID  - Hx of non-compliance.                     Plan:        - Recurrent bacteremia, ID following  - No clear source, AV graft not obviously infected, Sub Q ICD not obviously infected and typically would not cause bacteremia.     EVER with no signs of endocarditis.     - Cont warfarin  - cont asa  - Cont toprol  - Cont atorvastatin    Please call with cardiology concerns.          [x]? High complexity decision making was performed      We discussed the expected course, resolution and complications of the diagnoses in detail. Medication risk, benefits, costs, interactions, and alternatives were discussed as indicated. I advised him to contact the office if his condition worsens, changes or fails to improve as anticipated. Patient expressed understanding with the diagnoses  and plan. Subjective:     Yusuf Thompson denies chest pain, dyspnea. Discussed with RN events overnight.      Review of Systems:    Symptom Y/N Comments  Symptom Y/N Comments   Fever/Chills N   Chest Pain N    Poor Appetite N   Edema N    Cough N   Abdominal Pain N    Sputum N   Joint Pain N    SOB/ZAMUDIO N   Pruritis/Rash N    Nausea/vomit N   Tolerating PT/OT Y    Diarrhea N   Tolerating Diet Y    Constipation N   Other       Could NOT obtain due to:      Objective:      Physical Exam:    Last 24hrs VS reviewed since prior progress note. Most recent are:    Visit Vitals  /83   Pulse 86   Temp 98.5 °F (36.9 °C)   Resp 16   Ht 5' 7\" (1.702 m)   Wt 64.8 kg (142 lb 12.8 oz)   SpO2 99%   BMI 22.37 kg/m²     No intake or output data in the 24 hours ending 11/11/21 1042     General Appearance: Well developed, well nourished, alert & oriented x 3,    no acute distress. Ears/Nose/Mouth/Throat: Hearing grossly normal.  Neck: Supple. Chest: Lungs clear to auscultation bilaterally. Cardiovascular: Regular rate and rhythm, S1S2 normal, no murmur. Abdomen: Soft, non-tender, bowel sounds are active. Extremities: No edema bilaterally. Skin: Warm and dry. PMH/ reviewed - no change compared to H&P    Data Review    Telemetry: normal sinus rhythm     Lab Data Personally Reviewed:    Recent Labs     11/11/21  0540 11/10/21  0657   WBC 11.0 10.0   HGB 7.1* 7.2*   HCT 22.5* 22.3*   * 146*     Recent Labs     11/10/21  0657 11/09/21  0244 11/08/21  1812   INR 2.1* 1.9* 2.0*   PTP 21.1* 19.7* 20.4*      Recent Labs     11/11/21  0540 11/10/21  0657 11/09/21  0822   * 134* 132*   K 4.4 4.1 4.5   CL 96* 97 96*   CO2 25 26 21   BUN 41* 33* 61*   CREA 9.35* 8.06* 13.00*   GLU 88 86 77   CA 9.1 8.7 8.6   MG 2.2  --   --      No results for input(s): CPK, CKNDX, TROIQ in the last 72 hours. No lab exists for component: CPKMB  Lab Results   Component Value Date/Time    Cholesterol, total 139 05/22/2012 05:00 PM    HDL Cholesterol 32 05/22/2012 05:00 PM    LDL, calculated 75.8 05/22/2012 05:00 PM    Triglyceride 156 (H) 05/22/2012 05:00 PM    CHOL/HDL Ratio 4.3 05/22/2012 05:00 PM       Recent Labs     11/09/21  0822   ALB 2.2*     No results for input(s): PH, PCO2, PO2 in the last 72 hours.     Medications Personally Reviewed:    Current Facility-Administered Medications   Medication Dose Route Frequency    cefTRIAXone (ROCEPHIN) 2 g in 0.9% sodium chloride (MBP/ADV) 50 mL MBP  2 g IntraVENous Q24H    0.9% sodium chloride infusion 250 mL  250 mL IntraVENous PRN    oxyCODONE IR (ROXICODONE) tablet 5 mg  5 mg Oral Q4H PRN    HYDROmorphone (DILAUDID) injection 1 mg  1 mg IntraVENous Q4H PRN    WARFARIN INFORMATION NOTE (COUMADIN)   Other QPM    aspirin chewable tablet 81 mg  81 mg Oral DAILY    calcitRIOL (ROCALTROL) capsule 0.5 mcg  0.5 mcg Oral DAILY    atorvastatin (LIPITOR) tablet 20 mg  20 mg Oral DAILY    calcium acetate(phosphat bind) (PHOSLO) capsule 1,334 mg  2 Capsule Oral TID WITH MEALS    metoprolol succinate (TOPROL-XL) XL tablet 12.5 mg  12.5 mg Oral DAILY    pantoprazole (PROTONIX) tablet 40 mg  40 mg Oral ACB    sodium chloride (NS) flush 5-40 mL  5-40 mL IntraVENous Q8H    sodium chloride (NS) flush 5-40 mL  5-40 mL IntraVENous PRN    acetaminophen (TYLENOL) tablet 650 mg  650 mg Oral Q6H PRN    Or    acetaminophen (TYLENOL) suppository 650 mg  650 mg Rectal Q6H PRN    polyethylene glycol (MIRALAX) packet 17 g  17 g Oral DAILY PRN    ondansetron (ZOFRAN ODT) tablet 4 mg  4 mg Oral Q8H PRN    Or    ondansetron (ZOFRAN) injection 4 mg  4 mg IntraVENous Q6H PRN         Malissa Hager MD

## 2021-11-11 NOTE — PROGRESS NOTES
Patient arrived to Non-Invasive Cardiology Lab for In Patient EVER Procedure. Staff introduced to patient. Patient identifiers verified with Name and Date of Birth. Procedure verified with patient. Consent forms reviewed and signed by patient or authorized representative and verified. Allergies verified. Patient informed of procedure and plan of care. Questions answered with review. Patient on cardiac monitor, non-invasive blood pressure, SPO2 monitor. On RA. Patient is A&Ox3. Patient reports no complaints. Patient on stretcher, in low position, with side rails up. Patient instructed to call for assistance as needed.

## 2021-11-11 NOTE — ROUTINE PROCESS
Bedside shift change report given to Shaan Santos (oncoming nurse) by Marilyn SPEARRN (offgoing nurse). Report included the following information SBAR, Kardex and MAR.

## 2021-11-11 NOTE — PROGRESS NOTES
Vascular Surgery Progress Note  Mike Dutta ACNP-BC  11/11/2021       Subjective:     Ami Elaine a 46 y. o.  with a hx of ASHD, HTN, HLD, HFrEF, ESRF on home HD, multiple DVTs, HIT, anemia, small bowel necrosis, and GERD.  He is a well-known patient of the practice. He is routinely under the care of Dr. Addie Painting. He is s/p creation of a RUE bovine AVG (now w/o flow) & LUE Ellamore-Ryan AVG 9/2020.  He presented to [facility] in April of this year and was diagnosed bacteremia after an extensive evaluation including EVER, CT scan, and tag WBC scan were unremarkable he underwent exploration of his LUE AVG on 4/9/2021. Surgical cultures were negative. He was transferred to [facility] for evaluation of his AICD & S-ICD. A EVER there revealed a mobile echodensity 12 x 6 mm attached to the endocardial lead. 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 He continues to have a subcutaneous functioning S-ICD in place that was placed prior to his infection in 2019. CXR this admission shows no evidence of residual AICD generator and confirms a left lateral chest wall S-ICD. While admitted at [facility] he was noted to have \"scattered ill-defined groundglass opacities within the right upper and lower lobes along with some scattered ill-defined nodular opacities. Findings may be due to sequela of a multifocal atypical infection. Recommend repeat CT chest in 3 months to ensure resolution was recommeded. \"  He was discharged on a prolonged course of IV antibxs. He has a hx of cardiac stents. He is s/p failed renal transplant x2  He has a hx of gallstone pancreatitis and peritonitis secondary to bowel necrosis. He has a hx of IVC filter placement that was removed in 2014.      He denied tenderness, erythema, or edema of his graft. He presented with fever and leukocytosis.   His leukocytosis has resolved and his fever curve has trended down on IV antibxs. Updated blood cultures overnight are significant for Streptococcus intermedius in 4/4 BTLs. Repeat blood cultures remain NGTD. Patient has maxillo facial CT that was significant for multiple dental caries. Nursing Data:     Patient Vitals for the past 24 hrs:   BP Temp Pulse Resp SpO2   11/11/21 0930 117/83 98.5 °F (36.9 °C) 86 16 99 %   11/11/21 0920 117/71  81 16 99 %   11/11/21 0905 108/66  89 14 95 %   11/11/21 0900 109/72  91 14 99 %   11/11/21 0855 112/78  89 18 99 %   11/11/21 0850 107/76  88 25 98 %   11/11/21 0845 107/70  95 18 97 %   11/11/21 0840 111/69  93 27 95 %   11/11/21 0835 113/74  96 19 98 %   11/11/21 0830 100/61  89 18 100 %   11/11/21 0825 110/67  93 15 98 %   11/11/21 0820 120/73  95 20 100 %   11/11/21 0815 118/72  89 15 100 %   11/11/21 0810 120/71  90 18 99 %   11/11/21 0800 118/78  86 12 100 %   11/11/21 0736 114/69  85 17 100 %   11/11/21 0112 120/72 98.7 °F (37.1 °C) 91 18 100 %   11/10/21 1950 108/69 98.4 °F (36.9 °C) 86 18 100 %   11/10/21 1453 115/68 98.5 °F (36.9 °C) 68 18 100 %       No intake or output data in the 24 hours ending 11/11/21 1205    Exam:     Physical Exam  Constitutional:       Comments: Chronically ill-appearing  male   HENT:      Head:      Comments: Moon face     Nose: Nose normal.   Eyes:      Pupils: Pupils are equal, round, and reactive to light. Cardiovascular:      Rate and Rhythm: Normal rate and regular rhythm. Pulses:           Radial pulses are 2+ on the left side. Comments: Left upper extremity a with palpable thrill. No erythema or edema noted. Nontender. VG  Pulmonary:      Effort: Pulmonary effort is normal. No respiratory distress. Abdominal:      General: Abdomen is flat. There is no distension. Musculoskeletal:         General: Normal range of motion. Cervical back: Normal range of motion. Skin:     General: Skin is warm. Neurological:      General: No focal deficit present. Mental Status: Mental status is at baseline. Psychiatric:      Comments: Flat affect     Lab Review:     . Recent Results (from the past 24 hour(s))   VANCOMYCIN, RANDOM    Collection Time: 11/11/21  5:40 AM   Result Value Ref Range    Vancomycin, random 20.5 UG/ML   CBC WITH AUTOMATED DIFF    Collection Time: 11/11/21  5:40 AM   Result Value Ref Range    WBC 11.0 4.1 - 11.1 K/uL    RBC 2.39 (L) 4.10 - 5.70 M/uL    HGB 7.1 (L) 12.1 - 17.0 g/dL    HCT 22.5 (L) 36.6 - 50.3 %    MCV 94.1 80.0 - 99.0 FL    MCH 29.7 26.0 - 34.0 PG    MCHC 31.6 30.0 - 36.5 g/dL    RDW 18.0 (H) 11.5 - 14.5 %    PLATELET 267 (L) 488 - 400 K/uL    MPV 10.9 8.9 - 12.9 FL    NRBC 0.0 0  WBC    ABSOLUTE NRBC 0.00 0.00 - 0.01 K/uL    NEUTROPHILS 83 (H) 32 - 75 %    LYMPHOCYTES 8 (L) 12 - 49 %    MONOCYTES 6 5 - 13 %    EOSINOPHILS 3 0 - 7 %    BASOPHILS 0 0 - 1 %    IMMATURE GRANULOCYTES 0 0.0 - 0.5 %    ABS. NEUTROPHILS 9.2 (H) 1.8 - 8.0 K/UL    ABS. LYMPHOCYTES 0.8 0.8 - 3.5 K/UL    ABS. MONOCYTES 0.7 0.0 - 1.0 K/UL    ABS. EOSINOPHILS 0.3 0.0 - 0.4 K/UL    ABS. BASOPHILS 0.0 0.0 - 0.1 K/UL    ABS. IMM.  GRANS. 0.0 0.00 - 0.04 K/UL    DF AUTOMATED     METABOLIC PANEL, BASIC    Collection Time: 11/11/21  5:40 AM   Result Value Ref Range    Sodium 130 (L) 136 - 145 mmol/L    Potassium 4.4 3.5 - 5.1 mmol/L    Chloride 96 (L) 97 - 108 mmol/L    CO2 25 21 - 32 mmol/L    Anion gap 9 5 - 15 mmol/L    Glucose 88 65 - 100 mg/dL    BUN 41 (H) 6 - 20 MG/DL    Creatinine 9.35 (H) 0.70 - 1.30 MG/DL    BUN/Creatinine ratio 4 (L) 12 - 20      GFR est AA 7 (L) >60 ml/min/1.73m2    GFR est non-AA 6 (L) >60 ml/min/1.73m2    Calcium 9.1 8.5 - 10.1 MG/DL   MAGNESIUM    Collection Time: 11/11/21  5:40 AM   Result Value Ref Range    Magnesium 2.2 1.6 - 2.4 mg/dL   PHOSPHORUS    Collection Time: 11/11/21  5:40 AM   Result Value Ref Range    Phosphorus 3.9 2.6 - 4.7 MG/DL   ECHO EVER W OR WO CONTRAST    Collection Time: 11/11/21  8:43 AM   Result Value Ref Range    MV Peak Gradient 5.13 mmHg    MV Mean Gradient 1.76 mmHg    Mitral Valve Max Velocity 113.21 cm/s    Mitral Valve Annulus Velocity Time Integral 13.65 cm    Triscuspid Valve Regurgitation Peak Gradient 58.64 mmHg    TR Max Velocity 382.90 cm/s          Assessment/Plan:     Sepsis  -resolved  Thrombocytopenia   -mild  Streptococcus intermedius bacteremia  -History of MRSE epidermis lead endocarditis (4/2021). Status post removal of nonfunctioning AICD (4/22/2021). Residual functioning subcutaneous ICD (placed 2019). -History of multilobular groundglass opacities of the lungs (4/2021)  -multiple dental caries   -History of gallstone pancreatitis  -History of bowel necrosis with peritonitis  · No evidence of acute infection of the left upper extremity aVG. No role for procedural investigation. Previous cultures have been no growth. No clinical evidence of infection. White blood cell tag scan is unremarkable. · Consider CT of the chest as he has previously documented history of possible atypical pneumonia with follow-up CT in 3 months recommended. Defer to primary team.  · Antibxs per primary team      ESRF  -Nephrotic syndrome  -s/p failed renal transplant x2 (1992 & 2001)   -home HD 5 days per week  Hyponatremia    Anemia of chronic disease   · Plan per nephrology     Hypertension  Hyperlipidemia  Coronary artery disease  -stenting 2007  -[facility] 7/22/20 w/ patent stent and no significant disease  HFrEF-41%  History of ventricular tachycardia      Seizure disorder      VTE Prophylaxis:  History of recurrent left lower extremity DVT  History of left brachial vein DVT  History of right IJ thrombosis  History of medical noncompliance with anticoagulation clinic  History of HIT  -Warfarin: Therapeutic on arrival     Disposition:  Home     Vascular surgery signing off. We appreciate the opportunity to participate in the care of Mr. Devika Adamson. Follow up as needed. Please re-consult as needed.

## 2021-11-11 NOTE — PROGRESS NOTES
TRANSFER - OUT REPORT:    Verbal report given to Abebe (name) on [facility] being transferred to [facility] (unit) for routine progression of care       Report consisted of patient's Situation, Background, Assessment and   Recommendations(SBAR). Information from the following report(s) SBAR, Procedure Summary and MAR was reviewed with the receiving nurse. Opportunity for questions and clarification was provided.       Patient transported with:   Monitor

## 2021-11-11 NOTE — PROGRESS NOTES
Nephrology Progress Note  Blaise Leblanc     [email or web address]  Phone - [phone]   Patient: Porter Fink    YOB: 1975        Date- 11/11/2021   Admit Date: 11/5/2021  CC: Follow up for  ESRD         IMPRESSION & PLAN:   · ESRD- home HD 5 days per week- Follows up with Dr Jan Thomas at [facility]  · Failed RTX times 2  · Hyperkalemia  · Gram positive sepsis with lead extraction at [facility]  · Anemia of ckd  · Hx of renal tx in past times 2.  · Hypertension  · CAD         H/o DVT, s/p ivc filter/ h/o HIT     PLAN-   HD TODAY   Will keep him on TTS schedule for now   He will go to inpatient hd unit if he needs abx after discharge. Will follow with you. Subjective: Interval History:   -Seen and examined today  S/P EVER      Objective:   Vitals:    11/11/21 0900 11/11/21 0905 11/11/21 0920 11/11/21 0930   BP: 109/72 108/66 117/71 117/83   Pulse: 91 89 81 86   Resp: 14 14 16 16   Temp:    98.5 °F (36.9 °C)   TempSrc:       SpO2: 99% 95% 99% 99%   Weight:       Height:          No intake/output data recorded. Last 3 Recorded Weights in this Encounter    11/07/21 0058 11/07/21 1515 11/07/21 2137   Weight: 69.2 kg (152 lb 9.6 oz) 68.9 kg (152 lb) 64.8 kg (142 lb 12.8 oz)      Physical exam:   GEN: nad  NECK- Supple, no mass  RESP: No wheezing,clear b/l  CVS: S1,S2  RRR  NEURO: Normal speech, non focal  EXT: No Edema   PSYCH: Normal Mood  HD acess; R AVG    Chart reviewed. Pertinent Notes reviewed.      Data Review :  Recent Labs     11/11/21  0540 11/10/21  0657 11/09/21  0822   * 134* 132*   K 4.4 4.1 4.5   CL 96* 97 96*   CO2 25 26 21   BUN 41* 33* 61*   CREA 9.35* 8.06* 13.00*   GLU 88 86 77   CA 9.1 8.7 8.6   MG 2.2  --   --    PHOS 3.9  --  5.7*     Recent Labs     11/11/21  0540 11/10/21  0657 11/09/21  0822   WBC 11.0 10.0 12.9*   HGB 7.1* 7.2* 7.2*   HCT 22.5* 22.3* 21.7*   * 146* 156     No results for input(s): FE, TIBC, PSAT, FERR in the last 72 hours.    Medication list  reviewed  Current Facility-Administered Medications   Medication Dose Route Frequency    INR - Please draw for tonights warfarin  1 Each Other ONCE    cefTRIAXone (ROCEPHIN) 2 g in 0.9% sodium chloride (MBP/ADV) 50 mL MBP  2 g IntraVENous Q24H    0.9% sodium chloride infusion 250 mL  250 mL IntraVENous PRN    oxyCODONE IR (ROXICODONE) tablet 5 mg  5 mg Oral Q4H PRN    HYDROmorphone (DILAUDID) injection 1 mg  1 mg IntraVENous Q4H PRN    WARFARIN INFORMATION NOTE (COUMADIN)   Other QPM    aspirin chewable tablet 81 mg  81 mg Oral DAILY    calcitRIOL (ROCALTROL) capsule 0.5 mcg  0.5 mcg Oral DAILY    atorvastatin (LIPITOR) tablet 20 mg  20 mg Oral DAILY    calcium acetate(phosphat bind) (PHOSLO) capsule 1,334 mg  2 Capsule Oral TID WITH MEALS    metoprolol succinate (TOPROL-XL) XL tablet 12.5 mg  12.5 mg Oral DAILY    pantoprazole (PROTONIX) tablet 40 mg  40 mg Oral ACB    sodium chloride (NS) flush 5-40 mL  5-40 mL IntraVENous Q8H    sodium chloride (NS) flush 5-40 mL  5-40 mL IntraVENous PRN    acetaminophen (TYLENOL) tablet 650 mg  650 mg Oral Q6H PRN    Or    acetaminophen (TYLENOL) suppository 650 mg  650 mg Rectal Q6H PRN    polyethylene glycol (MIRALAX) packet 17 g  17 g Oral DAILY PRN    ondansetron (ZOFRAN ODT) tablet 4 mg  4 mg Oral Q8H PRN    Or    ondansetron (ZOFRAN) injection 4 mg  4 mg IntraVENous Q6H PRN          Michela Cat MD              [facility]  [facility] / [facility], [address]  [place], [address]  Phone - [phone]               Fax - [phone]

## 2021-11-12 LAB
BACTERIA SPEC CULT: NORMAL
INR PPP: 1.8 (ref 0.9–1.1)
PROTHROMBIN TIME: 18.4 SEC (ref 9–11.1)
SERVICE CMNT-IMP: NORMAL

## 2021-11-12 PROCEDURE — 99233 SBSQ HOSP IP/OBS HIGH 50: CPT | Performed by: STUDENT IN AN ORGANIZED HEALTH CARE EDUCATION/TRAINING PROGRAM

## 2021-11-12 PROCEDURE — 74011250636 HC RX REV CODE- 250/636: Performed by: STUDENT IN AN ORGANIZED HEALTH CARE EDUCATION/TRAINING PROGRAM

## 2021-11-12 PROCEDURE — 74011250637 HC RX REV CODE- 250/637: Performed by: STUDENT IN AN ORGANIZED HEALTH CARE EDUCATION/TRAINING PROGRAM

## 2021-11-12 PROCEDURE — 85610 PROTHROMBIN TIME: CPT

## 2021-11-12 PROCEDURE — 36415 COLL VENOUS BLD VENIPUNCTURE: CPT

## 2021-11-12 PROCEDURE — 65270000029 HC RM PRIVATE

## 2021-11-12 PROCEDURE — 74011000258 HC RX REV CODE- 258: Performed by: STUDENT IN AN ORGANIZED HEALTH CARE EDUCATION/TRAINING PROGRAM

## 2021-11-12 RX ORDER — WARFARIN 1 MG/1
3.5 TABLET ORAL ONCE
Status: COMPLETED | OUTPATIENT
Start: 2021-11-12 | End: 2021-11-12

## 2021-11-12 RX ADMIN — WARFARIN SODIUM 3.5 MG: 1 TABLET ORAL at 17:01

## 2021-11-12 RX ADMIN — ASPIRIN 81 MG: 81 TABLET, CHEWABLE ORAL at 08:31

## 2021-11-12 RX ADMIN — CALCITRIOL CAPSULES 0.25 MCG 0.5 MCG: 0.25 CAPSULE ORAL at 08:31

## 2021-11-12 RX ADMIN — HYDROMORPHONE HYDROCHLORIDE 1 MG: 1 INJECTION, SOLUTION INTRAMUSCULAR; INTRAVENOUS; SUBCUTANEOUS at 16:59

## 2021-11-12 RX ADMIN — Medication 10 ML: at 14:00

## 2021-11-12 RX ADMIN — CALCIUM ACETATE 1334 MG: 667 CAPSULE ORAL at 08:31

## 2021-11-12 RX ADMIN — CALCIUM ACETATE 1334 MG: 667 CAPSULE ORAL at 11:52

## 2021-11-12 RX ADMIN — HYDROMORPHONE HYDROCHLORIDE 1 MG: 1 INJECTION, SOLUTION INTRAMUSCULAR; INTRAVENOUS; SUBCUTANEOUS at 21:11

## 2021-11-12 RX ADMIN — HYDROMORPHONE HYDROCHLORIDE 1 MG: 1 INJECTION, SOLUTION INTRAMUSCULAR; INTRAVENOUS; SUBCUTANEOUS at 03:51

## 2021-11-12 RX ADMIN — OXYCODONE 5 MG: 5 TABLET ORAL at 15:39

## 2021-11-12 RX ADMIN — Medication 10 ML: at 21:11

## 2021-11-12 RX ADMIN — METOPROLOL SUCCINATE 12.5 MG: 25 TABLET, EXTENDED RELEASE ORAL at 08:32

## 2021-11-12 RX ADMIN — CEFTRIAXONE SODIUM 2 G: 2 INJECTION, POWDER, FOR SOLUTION INTRAMUSCULAR; INTRAVENOUS at 11:53

## 2021-11-12 RX ADMIN — HYDROMORPHONE HYDROCHLORIDE 1 MG: 1 INJECTION, SOLUTION INTRAMUSCULAR; INTRAVENOUS; SUBCUTANEOUS at 12:29

## 2021-11-12 RX ADMIN — CALCIUM ACETATE 1334 MG: 667 CAPSULE ORAL at 16:58

## 2021-11-12 RX ADMIN — HYDROMORPHONE HYDROCHLORIDE 1 MG: 1 INJECTION, SOLUTION INTRAMUSCULAR; INTRAVENOUS; SUBCUTANEOUS at 08:28

## 2021-11-12 RX ADMIN — PANTOPRAZOLE SODIUM 40 MG: 40 TABLET, DELAYED RELEASE ORAL at 08:32

## 2021-11-12 RX ADMIN — ATORVASTATIN CALCIUM 20 MG: 20 TABLET, FILM COATED ORAL at 08:32

## 2021-11-12 NOTE — PROGRESS NOTES
Hospitalist Progress Note    NAME: Monico Sosa   :  1975   MRN:  [id number]   Room Number:  2134/01  @ [facility]       Interim Hospital Summary: 42 y.o. male whom presented on 2021 with      Assessment / Plan:         Estimated discharge date: 11/15  Barriers: Clinical improvement,dental surgery evaluation, treatment of bacteremia           Coag negative staph aureus bacteremia /  Streptococcus intermedius bacteremia / bottles  Sepsis  History of AV graft  Presented with generalized body ache, nausea, vomiting. Sepsis indicators-tachycardia, tachypnea, fever. CT abdomen/pelvis-no acute abnormality. Chest x-ray-No focal airspace process. Mild cardiomegaly with probable pulmonary  arterial enlargement. Has a similar presentation in 2021 when the patient has persistent bacteremia and got ICD removed. TTE showed mitral valve thickening. tagged WBC Scan did not show abnormal uptake. US chest did not show localized fluid collection. Duplex of HD AVG did not show evidence of infection. CT Maxillofacial   Revealed possible periapical dental abscesses in multiple teeth. EVER did not show vegetations. -Abx switched to Ceftriaxone   - ID following  - ID, vascular surgery following  - Per ID, will consult Dr. Betty Hughes, Dental surgeon       End-stage renal disease on dialysis POA  Follows up with Dr Lucinda Douglass at [facility]     -Nephrology following on TTS schedule.   -Continue with the PhosLo and calcitriol. Acute on chronic anemia  -S/p 1 unit of PRBC. Hemoglobin is 7.2     History of DVT with graft thrombosis  -Continue Coumadin. Pharmacy managing warfarin. INR is 1.9. Hypertension  GERD  CAD  History of DVT and graft thromboses on Coumadin  History of HIT  ICMP s/p ICP placement  -Continue metoprolol, PPI, aspirin, Lipitor  -Pharmacy managing Coumadin dosing  -INR within goal     18.5 - 24.9 Normal weight / Body mass index is 22.37 kg/m². Estimated discharge date: 11/15  Barriers: Clinical improvement, treatment of bacteremia        Code Status: Full code  Surrogate Decision Maker:     DVT Prophylaxis: Coumadin  GI Prophylaxis: not indicated     Baseline: Ambulatory at home              Subjective:     Chief Complaint / Reason for Physician Visit  \"I have a headache\". Discussed with RN events overnight. Review of Systems:  No fevers, chills, appetite change, cough, sputum production, shortness of breath, dyspnea on exertion, nausea, vomitting, diarrhea, constipation, chest pain, leg edema, abdominal pain, joint pain, rash, itching. Tolerating PT/OT. Tolerating diet. Objective:     VITALS:   Last 24hrs VS reviewed since prior progress note.  Most recent are:  Patient Vitals for the past 24 hrs:   Temp Pulse Resp BP SpO2   11/12/21 0735 99.2 °F (37.3 °C) 76 16 127/73 100 %   11/11/21 2310 98.5 °F (36.9 °C) 84 18 110/69 96 %   11/11/21 2002 98.1 °F (36.7 °C) 92 16 114/72 100 %   11/11/21 1643 98 °F (36.7 °C) 81 16 119/72 --   11/11/21 1630 -- 85 16 130/69 --   11/11/21 1615 -- 87 16 126/81 --   11/11/21 1600 -- 80 16 110/69 --   11/11/21 1545 -- 78 16 (!) 106/59 --   11/11/21 1530 -- 87 16 103/65 --   11/11/21 1515 -- 79 16 119/71 --   11/11/21 1500 -- 83 16 132/80 --   11/11/21 1445 -- 87 16 127/75 --   11/11/21 1430 -- 83 16 132/85 --   11/11/21 1415 -- 82 16 122/80 --   11/11/21 1400 -- 79 16 113/66 --   11/11/21 1345 -- 94 16 113/79 --   11/11/21 1330 -- 84 16 113/73 --   11/11/21 1314 98.2 °F (36.8 °C) 88 16 120/80 --   11/11/21 1213 98.4 °F (36.9 °C) 90 17 116/79 97 %   11/11/21 0930 98.5 °F (36.9 °C) 86 16 117/83 99 %   11/11/21 0920 -- 81 16 117/71 99 %   11/11/21 0905 -- 89 14 108/66 95 %   11/11/21 0900 -- 91 14 109/72 99 %   11/11/21 0855 -- 89 18 112/78 99 %   11/11/21 0850 -- 88 25 107/76 98 %   11/11/21 0845 -- 95 18 107/70 97 %   11/11/21 0840 -- 93 27 111/69 95 %   11/11/21 0835 -- 96 19 113/74 98 %   11/11/21 0830 -- 89 18 100/61 100 %   11/11/21 0825 -- 93 15 110/67 98 %   11/11/21 0820 -- 95 20 120/73 100 %       Intake/Output Summary (Last 24 hours) at 11/12/2021 0819  Last data filed at 11/11/2021 1643  Gross per 24 hour   Intake --   Output 2000 ml   Net -2000 ml        PHYSICAL EXAM:  General: WD, WN. Alert, cooperative, no acute distress    EENT:  EOMI. Anicteric sclerae. MMM  Resp:  CTA bilaterally, no wheezing or rales. No accessory muscle use  CV:  Regular  rhythm,  normal S1/S2, no murmurs rubs gallops, No edema  GI:  Soft, Non distended, Non tender. +Bowel sounds  Neurologic:  Alert and oriented X 3, normal speech,   Psych:   Good insight. Not anxious nor agitated  Skin:  No rashes. No jaundice    Reviewed most current lab test results and cultures  YES  Reviewed most current radiology test results   YES  Review and summation of old records today    NO  Reviewed patient's current orders and MAR    YES  PMH/ reviewed - no change compared to H&P  ________________________________________________________________________  Care Plan discussed with:    Comments   Patient x    Family      RN x    Care Manager x    Consultant                       x Multidiciplinary team rounds were held today with , nursing, pharmacist and clinical coordinator. Patient's plan of care was discussed; medications were reviewed and discharge planning was addressed. ________________________________________________________________________  Total NON critical care TIME: 35  Minutes    Total CRITICAL CARE TIME Spent:   Minutes non procedure based      Comments   >50% of visit spent in counseling and coordination of care x    ________________________________________________________________________  Jessica Cotto MD     Procedures: see electronic medical records for all procedures/Xrays and details which were not copied into this note but were reviewed prior to creation of Plan.       LABS:  I reviewed today's most current labs and imaging studies.   Pertinent labs include:  Recent Labs     11/11/21  0540 11/10/21  0657 11/09/21  0822   WBC 11.0 10.0 12.9*   HGB 7.1* 7.2* 7.2*   HCT 22.5* 22.3* 21.7*   * 146* 156     Recent Labs     11/11/21  1315 11/11/21  0540 11/10/21  0657 11/09/21  0822   NA  --  130* 134* 132*   K  --  4.4 4.1 4.5   CL  --  96* 97 96*   CO2  --  25 26 21   GLU  --  88 86 77   BUN  --  41* 33* 61*   CREA  --  9.35* 8.06* 13.00*   CA  --  9.1 8.7 8.6   MG  --  2.2  --   --    PHOS  --  3.9  --  5.7*   ALB  --   --   --  2.2*   INR 2.1*  --  2.1*  --        Signed: Sheree Aschoff, MD

## 2021-11-12 NOTE — PROGRESS NOTES
Comprehensive Nutrition Assessment    Type and Reason for Visit: Initial, RD nutrition re-screen/LOS    Nutrition Recommendations/Plan:   Continue cardiac, consistent carb diet  Add Nepro BID  Please document % meals and supplements consumed in flowsheet I/O's under intake     Nutrition Assessment:      Chart reviewed for LOS. Pt noted for sepsis, ESRD on HD, multiple possible dental abscesses, acute on chronic anemia, HTN, GERN, CAD. MST noted for decreased appetite PTA. Pt reports his appetite is doing better, nearly 100% of lunch consumed per visual. He states he doesn't typically eat breakfast, so did not eat this morning. Pt interested in supplements, so will add Nepro BID. Pt notes his usual dry weight is ~66kg (145lb). Current weight 60kg (132lb). Per EMR, this would be significant for the time frame, but pt denies any significant weight loss despite recently decreased appetite. Suspect bed scale error, will order an updated weight. Will continue monitoring. Patient Vitals for the past 168 hrs:   % Diet Eaten   11/08/21 1241 76 - 100%   11/08/21 0831 76 - 100%   11/07/21 1803 26 - 50%     Wt Readings from Last 5 Encounters:   11/11/21 60 kg (132 lb 4.4 oz)   04/07/21 66.8 kg (147 lb 4.3 oz)   03/27/21 67.4 kg (148 lb 9.4 oz)   11/08/20 68.6 kg (151 lb 4.8 oz)   11/07/20 67.1 kg (148 lb)   ]    Malnutrition Assessment:  Malnutrition Status:     TBD    Estimated Daily Nutrient Needs:  Energy (kcal): 1883 kcals (BMR 1449 x 1. 3AF); Weight Used for Energy Requirements: Current  Protein (g): 72-84g (1.2-1.4g/kg); Weight Used for Protein Requirements: Current  Fluid (ml/day): 1500mL; Method Used for Fluid Requirements: Standard renal      Nutrition Related Findings:  Labs: reviewed. Meds: calcitriol, phoslo, rocephin, dilaudid, warfarin. Edema: 2+LUE, 1+BLE. BM 11/7. Wounds:    None       Current Nutrition Therapies:  ADULT DIET Regular; 4 carb choices (60 gm/meal); Low Fat/Low Chol/High Fiber/2 gm Na;  Low Sodium (2 gm)  ADULT ORAL NUTRITION SUPPLEMENT Dinner, Breakfast; Renal Supplement    Anthropometric Measures:  · Height:  5' 7\" (170.2 cm)  · Current Body Wt:  60 kg (132 lb 4.4 oz)    · Ideal Body Wt:  148 lbs:  89.4 %   · BMI Category:  Normal weight (BMI 18.5-24. 9)       Nutrition Diagnosis:   · Increased nutrient needs related to renal dysfunction (ESRD on HD) as evidenced by  (est protein needs 72-84g/day)      Nutrition Interventions:   Food and/or Nutrient Delivery: Continue current diet, Start oral nutrition supplement  Nutrition Education and Counseling: No recommendations at this time  Coordination of Nutrition Care: Continue to monitor while inpatient    Goals:  PO intake >70% meals/ONS next 4-6 days       Nutrition Monitoring and Evaluation:   Behavioral-Environmental Outcomes: None identified  Food/Nutrient Intake Outcomes: Food and nutrient intake, Supplement intake  Physical Signs/Symptoms Outcomes: Biochemical data, GI status, Weight, Fluid status or edema    Discharge Planning:    Continue oral nutrition supplement, Continue current diet     Electronically signed by Katia Golden RD on 11/12/2021 at 1:54 PM    Contact: [id number]

## 2021-11-12 NOTE — CONSULTS
Duplicate consult order:  See previous documentation. Per NSG note from HD 11/11 \"skin CDI. No s/s of infection. + B/T. No issues with cannulation or hemostasis. Running well at \"  SHABANA POSADAS previously evaluated.

## 2021-11-12 NOTE — PROGRESS NOTES
Infectious Disease Progress Note         Interval:  Afebrile ON      Subjective:   Reports feeling better today. No chills or night sweats. Reports persistent swelling and warmth at the dialysis graft site. Objective:    Vitals:   Reviewed in chart. Physical Exam:  ? GEN: NAD, tired-appearing. ? HEENT: Normocephalic, atraumatic, PERRL, no scleral icterus, multiple teeth missing. ? CV: S1, S2 heard regularly, thrills from AVG heard, left chest ICD no inflammation or tenderness  ? Lungs: Clear to auscultation bilaterally  ? Abdomen: soft  ? Genitourinary:  no oneal  ? Extremities: no edema  ? Neuro: Alert, oriented to time, place and situation, moves all extremities to commands, verbal   ? Skin: no rash  ? Psych: good affect, good eye contact, non tearful   ? Lines: left arm AVG is swollen and warm. Non-tender. Has a good thrill. Labs:  Recent Results (from the past 24 hour(s))   PROTHROMBIN TIME + INR    Collection Time: 11/11/21  1:15 PM   Result Value Ref Range    INR 2.1 (H) 0.9 - 1.1      Prothrombin time 21.5 (H) 9.0 - 11.1 sec   HEP B SURFACE AB    Collection Time: 11/11/21  3:15 PM   Result Value Ref Range    Hepatitis B surface Ab 904.40 mIU/mL    Hep B surface Ab Interp. REACTIVE (A) NR     HEP B SURFACE AG    Collection Time: 11/11/21  3:15 PM   Result Value Ref Range    Hepatitis B surface Ag 0.39 Index    Hep B surface Ag Interp.  Negative NEG     HEPATITIS B CORE AB, IGM    Collection Time: 11/11/21  3:15 PM   Result Value Ref Range    Hepatitis B core, IgM NONREACTIVE NR               Assessment:  42y.o. 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  Patient has the nonendovascular ICD in his left chest from before. This was not removed at [facility] because the suspicion for involvement of this ICD from the infection was low. Also has hx of H/o DVT, s/p ivc filter/ h/o HIT.     This admission:  - Sepsis from Streptococcus intermedius bacteremia (Pen G ZECHARIAH <0.06ug/ml) . Symptoms ongoing for ~6 days PTA. Per exam, suspicion is high for AVG involvement. Left chest ICD is non-endovascular. Hence, not at high risk for infectious involvement from bloodstream infections. EVER done 11/11/21. No vegetations. Left chest US no fluid collections at the ICD. No focal suspicion seen for infectious involvement currently.        Recommendations:  - CT maxillofacial showing multiple periapical abscess - difficult to exclude. Will prefer that patient get atleast some inpatient dental/oral surgery evaluation - Dr. Valiant Ganser. Relayed to Dr. Ney Benjamin. Still awaiting response from surgery. These are likely the source of this specific bacteremia with Strep intermedius (viridans). - Graft site is warm and swollen. Attempting to reach Dr. Ryland Fleischer to discuss. -  Left chest ICD is non-endovascular. Hence, not at high risk for infectious involvement from bloodstream infections. On exam, there is no signs of inflammation or pocket-site involvement seen. Left chest US no fluid collections at the ICD. No focal suspicion seen for infectious involvement currently. - Repeat blood cultures from 11/7/21 negative so far. - Continue ceftriaxone 2gm daily. - Any fever >101F, must please obtain repeat blood cultures if feasible.   - Duration of abx pending clinical course, atleast 4 weeks. Please call the on-call ID provider with Qs. Thank you for the opportunity to participate in the care of this patient.    Please contact with questions or concerns.       Nithya Nogueira MD  Infectious Diseases

## 2021-11-12 NOTE — PROGRESS NOTES
Pharmacist Daily Dosing of Warfarin    Indication & Goal INR: AFib, INR Goal 2-3   has pacemaker, Hx DVT and graft thromboses, Hx HIT    PTA Warfarin Dose: 2.5 mg daily    Notable concurrent conditions and medications:     Labs:  Recent Labs     11/12/21  1535 11/11/21  1315 11/11/21  0540 11/10/21  0657 11/10/21  0657   INR 1.8* 2.1*  --   --  2.1*   HGB  --   --  7.1*   < > 7.2*   PLT  --   --  148*  --  146*    < > = values in this interval not displayed. Impression/Plan:   Warfarin 3.5 mg this afternoon. Patient has been refusing labs. Dose missed yesterday. Daily INR ordered  CBC w/o differential every other day has been ordered     Pharmacy will follow daily and adjust the dose as appropriate.     Thank you,  Gregg Scott, PHARMD

## 2021-11-12 NOTE — PROGRESS NOTES
Nephrology Progress Note  Blaise Leblanc     [email or web address]  Phone - [phone]   Patient: Herlinda Cox    YOB: 1975        Date- 11/12/2021   Admit Date: 11/5/2021  CC: Follow up for  ESRD         IMPRESSION & PLAN:   · ESRD- home HD 5 days per week- Follows up with Dr Camille Russell at [facility]  · Failed RTX times 2  · Hyperkalemia  · Gram positive sepsis with lead extraction at [facility]  · Anemia of ckd  · Hx of renal tx in past times 2.  · Hypertension  · CAD         H/o DVT, s/p ivc filter/ h/o HIT     PLAN-   HD tomorrow   Source of infection is probably dental abscesses. Antibiotics per ID.  EVER negative for vegetation   Will keep him on TTS schedule for now   He will go to inpatient hd unit at [facility] under Dr. Obrien Mention if he needs abx after discharge. Subjective: Interval History:   -Seen and examined today  S/P EVER      Objective:   Vitals:    11/11/21 1643 11/11/21 2002 11/11/21 2310 11/12/21 0735   BP: 119/72 114/72 110/69 127/73   Pulse: 81 92 84 76   Resp: 16 16 18 16   Temp: 98 °F (36.7 °C) 98.1 °F (36.7 °C) 98.5 °F (36.9 °C) 99.2 °F (37.3 °C)   TempSrc: Oral      SpO2:  100% 96% 100%   Weight:       Height:          11/11 0701 - 11/12 0700  In: -   Out: 2000   Last 3 Recorded Weights in this Encounter    11/07/21 1515 11/07/21 2137 11/11/21 1213   Weight: 68.9 kg (152 lb) 64.8 kg (142 lb 12.8 oz) 60 kg (132 lb 4.4 oz)      Physical exam:   GEN: nad  NECK- Supple, no mass  RESP: No wheezing,clear b/l  CVS: S1,S2  RRR  NEURO: Normal speech, non focal  EXT: No Edema   PSYCH: Normal Mood  HD acess; R AVG    Chart reviewed. Pertinent Notes reviewed.      Data Review :  Recent Labs     11/11/21  0540 11/10/21  0657   * 134*   K 4.4 4.1   CL 96* 97   CO2 25 26   BUN 41* 33*   CREA 9.35* 8.06*   GLU 88 86   CA 9.1 8.7   MG 2.2  --    PHOS 3.9  --      Recent Labs     11/11/21  0540 11/10/21  0657   WBC 11.0 10.0   HGB 7.1* 7.2* HCT 22.5* 22.3*   * 146*     No results for input(s): FE, TIBC, PSAT, FERR in the last 72 hours.    Medication list  reviewed  Current Facility-Administered Medications   Medication Dose Route Frequency    cefTRIAXone (ROCEPHIN) 2 g in 0.9% sodium chloride (MBP/ADV) 50 mL MBP  2 g IntraVENous Q24H    0.9% sodium chloride infusion 250 mL  250 mL IntraVENous PRN    oxyCODONE IR (ROXICODONE) tablet 5 mg  5 mg Oral Q4H PRN    HYDROmorphone (DILAUDID) injection 1 mg  1 mg IntraVENous Q4H PRN    WARFARIN INFORMATION NOTE (COUMADIN)   Other QPM    aspirin chewable tablet 81 mg  81 mg Oral DAILY    calcitRIOL (ROCALTROL) capsule 0.5 mcg  0.5 mcg Oral DAILY    atorvastatin (LIPITOR) tablet 20 mg  20 mg Oral DAILY    calcium acetate(phosphat bind) (PHOSLO) capsule 1,334 mg  2 Capsule Oral TID WITH MEALS    metoprolol succinate (TOPROL-XL) XL tablet 12.5 mg  12.5 mg Oral DAILY    pantoprazole (PROTONIX) tablet 40 mg  40 mg Oral ACB    sodium chloride (NS) flush 5-40 mL  5-40 mL IntraVENous Q8H    sodium chloride (NS) flush 5-40 mL  5-40 mL IntraVENous PRN    acetaminophen (TYLENOL) tablet 650 mg  650 mg Oral Q6H PRN    Or    acetaminophen (TYLENOL) suppository 650 mg  650 mg Rectal Q6H PRN    polyethylene glycol (MIRALAX) packet 17 g  17 g Oral DAILY PRN    ondansetron (ZOFRAN ODT) tablet 4 mg  4 mg Oral Q8H PRN    Or    ondansetron (ZOFRAN) injection 4 mg  4 mg IntraVENous Q6H PRN          Laurita Bird MD              [facility]  [facility] [facility], [address] [place], [address]  Phone - [phone]               Fax - [phone]

## 2021-11-13 LAB
ALBUMIN SERPL-MCNC: 2.4 G/DL (ref 3.5–5)
ALBUMIN/GLOB SERPL: 0.5 {RATIO} (ref 1.1–2.2)
ALP SERPL-CCNC: 118 U/L (ref 45–117)
ALT SERPL-CCNC: 7 U/L (ref 12–78)
AST SERPL-CCNC: 10 U/L (ref 15–37)
BILIRUB DIRECT SERPL-MCNC: 0.1 MG/DL (ref 0–0.2)
BILIRUB SERPL-MCNC: 0.3 MG/DL (ref 0.2–1)
GLOBULIN SER CALC-MCNC: 5.1 G/DL (ref 2–4)
INR PPP: 1.7 (ref 0.9–1.1)
PROT SERPL-MCNC: 7.5 G/DL (ref 6.4–8.2)
PROTHROMBIN TIME: 16.9 SEC (ref 9–11.1)

## 2021-11-13 PROCEDURE — 74011250636 HC RX REV CODE- 250/636: Performed by: STUDENT IN AN ORGANIZED HEALTH CARE EDUCATION/TRAINING PROGRAM

## 2021-11-13 PROCEDURE — 65270000029 HC RM PRIVATE

## 2021-11-13 PROCEDURE — 90935 HEMODIALYSIS ONE EVALUATION: CPT

## 2021-11-13 PROCEDURE — 85610 PROTHROMBIN TIME: CPT

## 2021-11-13 PROCEDURE — 74011250637 HC RX REV CODE- 250/637: Performed by: STUDENT IN AN ORGANIZED HEALTH CARE EDUCATION/TRAINING PROGRAM

## 2021-11-13 PROCEDURE — 80076 HEPATIC FUNCTION PANEL: CPT

## 2021-11-13 PROCEDURE — 74011250637 HC RX REV CODE- 250/637: Performed by: INTERNAL MEDICINE

## 2021-11-13 PROCEDURE — 74011000258 HC RX REV CODE- 258: Performed by: STUDENT IN AN ORGANIZED HEALTH CARE EDUCATION/TRAINING PROGRAM

## 2021-11-13 PROCEDURE — 36415 COLL VENOUS BLD VENIPUNCTURE: CPT

## 2021-11-13 RX ORDER — WARFARIN SODIUM 5 MG/1
5 TABLET ORAL ONCE
Status: COMPLETED | OUTPATIENT
Start: 2021-11-13 | End: 2021-11-13

## 2021-11-13 RX ADMIN — CALCIUM ACETATE 1334 MG: 667 CAPSULE ORAL at 12:01

## 2021-11-13 RX ADMIN — OXYCODONE 5 MG: 5 TABLET ORAL at 16:03

## 2021-11-13 RX ADMIN — CALCIUM ACETATE 1334 MG: 667 CAPSULE ORAL at 21:07

## 2021-11-13 RX ADMIN — ASPIRIN 81 MG: 81 TABLET, CHEWABLE ORAL at 09:11

## 2021-11-13 RX ADMIN — Medication 10 ML: at 14:00

## 2021-11-13 RX ADMIN — OXYCODONE 5 MG: 5 TABLET ORAL at 08:33

## 2021-11-13 RX ADMIN — ATORVASTATIN CALCIUM 20 MG: 20 TABLET, FILM COATED ORAL at 09:11

## 2021-11-13 RX ADMIN — HYDROMORPHONE HYDROCHLORIDE 1 MG: 1 INJECTION, SOLUTION INTRAMUSCULAR; INTRAVENOUS; SUBCUTANEOUS at 01:10

## 2021-11-13 RX ADMIN — HYDROMORPHONE HYDROCHLORIDE 1 MG: 1 INJECTION, SOLUTION INTRAMUSCULAR; INTRAVENOUS; SUBCUTANEOUS at 17:36

## 2021-11-13 RX ADMIN — CALCITRIOL CAPSULES 0.25 MCG 0.5 MCG: 0.25 CAPSULE ORAL at 09:11

## 2021-11-13 RX ADMIN — PANTOPRAZOLE SODIUM 40 MG: 40 TABLET, DELAYED RELEASE ORAL at 09:11

## 2021-11-13 RX ADMIN — HYDROMORPHONE HYDROCHLORIDE 1 MG: 1 INJECTION, SOLUTION INTRAMUSCULAR; INTRAVENOUS; SUBCUTANEOUS at 21:38

## 2021-11-13 RX ADMIN — HYDROMORPHONE HYDROCHLORIDE 1 MG: 1 INJECTION, SOLUTION INTRAMUSCULAR; INTRAVENOUS; SUBCUTANEOUS at 09:11

## 2021-11-13 RX ADMIN — HYDROMORPHONE HYDROCHLORIDE 1 MG: 1 INJECTION, SOLUTION INTRAMUSCULAR; INTRAVENOUS; SUBCUTANEOUS at 13:12

## 2021-11-13 RX ADMIN — CALCIUM ACETATE 1334 MG: 667 CAPSULE ORAL at 09:11

## 2021-11-13 RX ADMIN — HYDROMORPHONE HYDROCHLORIDE 1 MG: 1 INJECTION, SOLUTION INTRAMUSCULAR; INTRAVENOUS; SUBCUTANEOUS at 05:11

## 2021-11-13 RX ADMIN — Medication 10 ML: at 21:05

## 2021-11-13 RX ADMIN — CEFTRIAXONE SODIUM 2 G: 2 INJECTION, POWDER, FOR SOLUTION INTRAMUSCULAR; INTRAVENOUS at 12:01

## 2021-11-13 RX ADMIN — METOPROLOL SUCCINATE 12.5 MG: 25 TABLET, EXTENDED RELEASE ORAL at 09:11

## 2021-11-13 RX ADMIN — Medication 10 ML: at 05:11

## 2021-11-13 RX ADMIN — WARFARIN SODIUM 5 MG: 5 TABLET ORAL at 21:05

## 2021-11-13 NOTE — PROCEDURES
[facility] / [phone]    Vitals Pre Post Assessment Pre Post   BP BP: 138/87 (11/13/21 1715) 139/90 LOC A&Ox4, cooperative, follows commands A&Ox4, cooperative, follows commands   HR Pulse (Heart Rate): 83 (11/13/21 1715) 79 Lungs CTA bilaterally, no complaints of shortness of breath CTA bilaterally, no complaints of shortness of breath   Resp Resp Rate: 18 (11/13/21 1715) 18 Cardiac Regular, S1, S2 Regular, S1, S2   Temp Temp: 98.7 °F (37.1 °C) (11/13/21 1636) 98.6 Skin LUE AVG LUE AVG   Weight    Edema 1-2+ 1-2+   Tele status NSR- remote tele NSR- remote tele Pain Pain Intensity 1: 7 (11/13/21 1736) 2     Orders   Duration: Start: 8840 End: 2006 Total: 3.5 hours   Dialyzer: Dialyzer/Set Up Inspection: Debby Ferraro (11/13/21 1636)   K Bath: Dialysate K (mEq/L): 3 (11/13/21 1636)   Ca Bath: Dialysate CA (mEq/L): 2.5 (11/13/21 1636)   Na: Dialysate NA (mEq/L): 138 (11/13/21 1636)   Bicarb: Dialysate HCO3 (mEq/L): 35 (11/13/21 1636)   Target Fluid Removal: Goal/Amount of Fluid to Remove (mL): 2000 mL (11/13/21 1636)     Access   Type & Location: LUE AVG   Comments:        SUZANNE AVG: skin CDI. No s/s of infection. + B/T. No issues with cannulation or hemostasis. Running well at - unable to achieve higher BFR due to elevated arterial pressure.                                   Labs   HBsAg (Antigen) / date: 11/11/21 Negative                                              HBsAb (Antibody) / date: 11/11/21 904.40 IMMUNE   Source:    Obtained/Reviewed  Critical Results Called HGB   Date Value Ref Range Status   11/11/2021 7.1 (L) 12.1 - 17.0 g/dL Final     Potassium   Date Value Ref Range Status   11/11/2021 4.4 3.5 - 5.1 mmol/L Final     Calcium   Date Value Ref Range Status   11/11/2021 9.1 8.5 - 10.1 MG/DL Final     BUN   Date Value Ref Range Status   11/11/2021 41 (H) 6 - 20 MG/DL Final     Creatinine   Date Value Ref Range Status   11/11/2021 9.35 (H) 0.70 - 1.30 MG/DL Final        Meds Given   Name Dose Route None                 Adequacy / Fluid    Total Liters Process: 58.5 L   Net Fluid Removed: 2000 mL      Comments   Time Out Done:   (Time) @ 4240   Admitting Diagnosis: Coag negative staph aureus bacteremia    Consent obtained/signed: Informed Consent Verified: Yes (11/13/21 1636)   Machine / Sylvester Yap # Machine Number: [id number] (11/13/21 5665)   Primary Nurse Rpt Pre: Rolo Wagner RN   Primary Nurse Rpt Post: Ely Bolaños RN   Pt Education: Ordered Dialysis Treatment   Care Plan: CKD   Pts outpatient clinic: [address]      Tx Summary   Comments:    Pt arrived to HD suite A&Ox4. Consent signed & on file. SBAR received from Primary RN. 1636: Pt cannulated with 76H needles per policy & without issue. Labs drawn per request/ order. VSS. Dialysis Tx initiated. 1730: Pt requesting pain medication for aching abdominal and back pain, called and informed primary care RN. 1750: Pt received pain medication per MAR. VSS. Lines patent and intact. 1930: Pt resting quietly, no complaints of pain, VSS, lines patent and intact. 2006: Tx ended. VSS. All possible blood returned to patient. Hemostasis achieved without issue. Bed locked and in the lowest position, call bell and belongings in reach. SBAR given to Primary, RN. Patient is stable at time of their departure. All Dialysis related medications have been reviewed.

## 2021-11-13 NOTE — PROGRESS NOTES
Alert and orientd x4, assessments and VS completed per orders, medications administered, pain reported by patient, PRN medication provided and effective,pt off unit for dialysis

## 2021-11-13 NOTE — PROGRESS NOTES
Hospitalist Progress Note    NAME: Rachelle Carlson   :  1975   MRN:  [id number]   Room Number:  2134/01  @ [facility]       Interim Hospital Summary: 42 y.o. male whom presented on 2021 with      Assessment / Plan:       Estimated discharge date: 11/15  Barriers: Clinical improvement,dental surgery evaluation, treatment of bacteremia         Coag negative staph aureus bacteremia 1/4  Streptococcus intermedius bacteremia 4/4 bottles  Sepsis  History of AV graft  Presented with generalized body ache, nausea, vomiting. Sepsis indicatorstachycardia, tachypnea, fever. CT abdomen/pelvisno acute abnormality. Chest x-rayNo focal airspace process. Mild cardiomegaly with probable pulmonary  arterial enlargement. Has a similar presentation in 2021 when the patient has persistent bacteremia and got ICD removed. TTE showed mitral valve thickening. tagged WBC Scan did not show abnormal uptake. US chest did not show localized fluid collection. Duplex of HD AVG did not show evidence of infection. CT Maxillofacial   Revealed possible periapical dental abscesses in multiple teeth. EVER did not show vegetations.    -cont' IV abx guided by ID, appreciate recs. Pt will likely need ~4 weeks of IV abx  -repeat Bcx pending  - ID, vascular surgery evaluated,signed off on   - ID recommended evaluation from dental surgeon. Previous attending left messages for Dr. Jourdan Hu, Dental surgeon. End-stage renal disease on dialysis POA  Follows up with Dr Keyla Brothers at [facility]     Nephrology following on TTS schedule. Continue with the PhosLo and calcitriol. Acute on chronic anemia  -S/p 1 unit of PRBC. Hemoglobin is 7.1     History of DVT with graft thrombosis  -Continue Coumadin. Pharmacy managing warfarin. INR is 1.8.      Hypertension  GERD  CAD  History of DVT and graft thromboses on Coumadin  History of HIT  ICMP s/p ICP placement  Continue metoprolol, PPI, aspirin, Lipitor  Pharmacy managing Coumadin dosing  -daily INR     18.5 - 24.9 Normal weight / Body mass index is 22.37 kg/m². Estimated discharge date: 11/15  Barriers: Clinical improvement, treatment of bacteremia        Code Status: Full code  Surrogate Decision Maker:   DVT Prophylaxis: Coumadin  GI Prophylaxis: not indicated   Baseline: Ambulatory at home              Subjective:     Chief Complaint / Reason for Physician Visit  \"I have a headache\". Discussed with RN events overnight. Review of Systems:  No fevers, chills, appetite change, cough, sputum production, shortness of breath, dyspnea on exertion, nausea, vomitting, diarrhea, constipation, chest pain, leg edema, abdominal pain, joint pain, rash, itching. Tolerating PT/OT. Tolerating diet. Objective:     VITALS:   Last 24hrs VS reviewed since prior progress note. Most recent are:  Patient Vitals for the past 24 hrs:   Temp Pulse Resp BP SpO2   11/13/21 0906  86 20 134/84 100 %   11/13/21 0320 98.2 °F (36.8 °C) 92 19 117/76 94 %   11/12/21 2322 99 °F (37.2 °C) 91 18 129/86 94 %   11/12/21 2003 98.4 °F (36.9 °C) 84 19 118/85 97 %   11/12/21 1432 98.7 °F (37.1 °C) 87 18 114/87 98 %   11/12/21 1156 99.2 °F (37.3 °C) 83 22 124/85 97 %     No intake or output data in the 24 hours ending 11/13/21 1053     PHYSICAL EXAM:  General: WD, WN. Alert, cooperative, no acute distress    EENT:  EOMI. Anicteric sclerae. MMM  Resp:  CTA bilaterally, no wheezing or rales. No accessory muscle use  CV:  Regular  rhythm,  normal S1/S2, no murmurs rubs gallops, No edema  GI:  Soft, Non distended, Non tender. +Bowel sounds  Neurologic:  Alert and oriented X 3, normal speech,   Psych:   Good insight. Not anxious nor agitated  Skin:  No rashes.   No jaundice    Reviewed most current lab test results and cultures  YES  Reviewed most current radiology test results   YES  Review and summation of old records today    NO  Reviewed patient's current orders and [facility] YES  PMH/SH reviewed - no change compared to H&P  ________________________________________________________________________  Care Plan discussed with:    Comments   Patient x    Family      RN x    Care Manager     Consultant                        Multidiciplinary team rounds were held today with , nursing, pharmacist and clinical coordinator. Patient's plan of care was discussed; medications were reviewed and discharge planning was addressed. ________________________________________________________________________  Total NON critical care TIME: 35  Minutes    Total CRITICAL CARE TIME Spent:   Minutes non procedure based      Comments   >50% of visit spent in counseling and coordination of care x    ________________________________________________________________________  John Corrales MD     Procedures: see electronic medical records for all procedures/Xrays and details which were not copied into this note but were reviewed prior to creation of Plan. LABS:  I reviewed today's most current labs and imaging studies.   Pertinent labs include:  Recent Labs     11/11/21  0540   WBC 11.0   HGB 7.1*   HCT 22.5*   *     Recent Labs     11/12/21  1535 11/11/21  1315 11/11/21  0540   NA  --   --  130*   K  --   --  4.4   CL  --   --  96*   CO2  --   --  25   GLU  --   --  88   BUN  --   --  41*   CREA  --   --  9.35*   CA  --   --  9.1   MG  --   --  2.2   PHOS  --   --  3.9   INR 1.8* 2.1*  --        Signed: John Corrales MD

## 2021-11-13 NOTE — PROGRESS NOTES
Alert and orientd x4, assessments and VS completed per orders, medications administered, pain reported by patient, PRN medication provided and effective,

## 2021-11-13 NOTE — PROGRESS NOTES
Pharmacist Daily Dosing of Warfarin    Indication & Goal INR: AFib, INR Goal 2-3   has pacemaker, Hx DVT and graft thromboses, Hx HIT    PTA Warfarin Dose: 2.5 mg daily    Notable concurrent conditions and medications:     Labs:  Recent Labs     11/12/21  1535 11/11/21  1315 11/11/21  0540   INR 1.8* 2.1*  --    HGB  --   --  7.1*   PLT  --   --  148*       Impression/Plan:   Warfarin 5 mg this afternoon. Patient has been refusing labs. Dose missed on 11/11. Daily INR ordered  CBC w/o differential every other day has been ordered     Pharmacy will follow daily and adjust the dose as appropriate.     Thank you,  Geoffrey Carvalho, CHIQUITAD

## 2021-11-13 NOTE — PROGRESS NOTES
End of Shift Note    Bedside shift change report given to [place] (oncoming nurse) by Ely Bolaños (offgoing nurse). Report included the following information SBAR, Kardex, Intake/Output, MAR and Recent Results    Shift worked:  1814-0908     Shift summary and any significant changes:     No significant changes, pt requesting dilaudid [id number]. Concerns for physician to address:  pain management-scheduled regimen?      Zone phone for oncoming shift:          Ely Bolaños

## 2021-11-14 LAB
INR PPP: 1.7 (ref 0.9–1.1)
PROTHROMBIN TIME: 16.9 SEC (ref 9–11.1)

## 2021-11-14 PROCEDURE — 74011000258 HC RX REV CODE- 258: Performed by: STUDENT IN AN ORGANIZED HEALTH CARE EDUCATION/TRAINING PROGRAM

## 2021-11-14 PROCEDURE — 74011250636 HC RX REV CODE- 250/636: Performed by: STUDENT IN AN ORGANIZED HEALTH CARE EDUCATION/TRAINING PROGRAM

## 2021-11-14 PROCEDURE — 65270000029 HC RM PRIVATE

## 2021-11-14 PROCEDURE — 74011250637 HC RX REV CODE- 250/637: Performed by: INTERNAL MEDICINE

## 2021-11-14 PROCEDURE — 85610 PROTHROMBIN TIME: CPT

## 2021-11-14 PROCEDURE — 36415 COLL VENOUS BLD VENIPUNCTURE: CPT

## 2021-11-14 PROCEDURE — 74011250637 HC RX REV CODE- 250/637: Performed by: STUDENT IN AN ORGANIZED HEALTH CARE EDUCATION/TRAINING PROGRAM

## 2021-11-14 RX ORDER — WARFARIN 1 MG/1
4 TABLET ORAL ONCE
Status: COMPLETED | OUTPATIENT
Start: 2021-11-14 | End: 2021-11-14

## 2021-11-14 RX ADMIN — CALCIUM ACETATE 1334 MG: 667 CAPSULE ORAL at 08:20

## 2021-11-14 RX ADMIN — Medication 10 ML: at 15:12

## 2021-11-14 RX ADMIN — WARFARIN SODIUM 4 MG: 1 TABLET ORAL at 17:26

## 2021-11-14 RX ADMIN — HYDROMORPHONE HYDROCHLORIDE 1 MG: 1 INJECTION, SOLUTION INTRAMUSCULAR; INTRAVENOUS; SUBCUTANEOUS at 01:48

## 2021-11-14 RX ADMIN — HYDROMORPHONE HYDROCHLORIDE 1 MG: 1 INJECTION, SOLUTION INTRAMUSCULAR; INTRAVENOUS; SUBCUTANEOUS at 19:34

## 2021-11-14 RX ADMIN — PANTOPRAZOLE SODIUM 40 MG: 40 TABLET, DELAYED RELEASE ORAL at 08:20

## 2021-11-14 RX ADMIN — HYDROMORPHONE HYDROCHLORIDE 1 MG: 1 INJECTION, SOLUTION INTRAMUSCULAR; INTRAVENOUS; SUBCUTANEOUS at 23:36

## 2021-11-14 RX ADMIN — CALCIUM ACETATE 1334 MG: 667 CAPSULE ORAL at 12:44

## 2021-11-14 RX ADMIN — HYDROMORPHONE HYDROCHLORIDE 1 MG: 1 INJECTION, SOLUTION INTRAMUSCULAR; INTRAVENOUS; SUBCUTANEOUS at 15:11

## 2021-11-14 RX ADMIN — METOPROLOL SUCCINATE 12.5 MG: 25 TABLET, EXTENDED RELEASE ORAL at 08:20

## 2021-11-14 RX ADMIN — OXYCODONE 5 MG: 5 TABLET ORAL at 03:56

## 2021-11-14 RX ADMIN — Medication 10 ML: at 05:50

## 2021-11-14 RX ADMIN — CALCIUM ACETATE 1334 MG: 667 CAPSULE ORAL at 17:26

## 2021-11-14 RX ADMIN — CALCITRIOL CAPSULES 0.25 MCG 0.5 MCG: 0.25 CAPSULE ORAL at 08:20

## 2021-11-14 RX ADMIN — ASPIRIN 81 MG: 81 TABLET, CHEWABLE ORAL at 08:20

## 2021-11-14 RX ADMIN — HYDROMORPHONE HYDROCHLORIDE 1 MG: 1 INJECTION, SOLUTION INTRAMUSCULAR; INTRAVENOUS; SUBCUTANEOUS at 05:50

## 2021-11-14 RX ADMIN — OXYCODONE 5 MG: 5 TABLET ORAL at 20:45

## 2021-11-14 RX ADMIN — HYDROMORPHONE HYDROCHLORIDE 1 MG: 1 INJECTION, SOLUTION INTRAMUSCULAR; INTRAVENOUS; SUBCUTANEOUS at 11:01

## 2021-11-14 RX ADMIN — CEFTRIAXONE SODIUM 2 G: 2 INJECTION, POWDER, FOR SOLUTION INTRAMUSCULAR; INTRAVENOUS at 11:01

## 2021-11-14 RX ADMIN — OXYCODONE 5 MG: 5 TABLET ORAL at 08:31

## 2021-11-14 RX ADMIN — ATORVASTATIN CALCIUM 20 MG: 20 TABLET, FILM COATED ORAL at 08:20

## 2021-11-14 NOTE — PROGRESS NOTES
Pharmacist Daily Dosing of Warfarin    Indication & Goal INR: AFib, INR Goal 2-3   has pacemaker, Hx DVT and graft thromboses, Hx HIT    PTA Warfarin Dose: 2.5 mg daily    Notable concurrent conditions and medications:     Labs:  Recent Labs     11/14/21  0034 11/13/21  1730 11/12/21  1535   INR 1.7* 1.7* 1.8*   TBILI  --  0.3  --    ALB  --  2.4*  --        Impression/Plan:   Warfarin 3 mg this afternoon. Patient has been refusing labs. Dose missed on 11/11. Daily INR ordered  CBC w/o differential every other day has been ordered     Pharmacy will follow daily and adjust the dose as appropriate.     Thank you,  [place], CHIQUITAD

## 2021-11-14 NOTE — PROGRESS NOTES
Hospitalist Progress Note    NAME: Elvin Salgado   :  1975   MRN:  [id number]   Room Number:  2134/01  @ [facility]       Interim Hospital Summary: 42 y.o. male whom presented on 2021 with      Assessment / Plan:       Estimated discharge date: 11/15  Barriers: Clinical improvement,dental surgery evaluation, treatment of bacteremia         Coag negative staph aureus bacteremia 1/4  Streptococcus intermedius bacteremia 4/4 bottles  Sepsis  History of AV graft  Presented with generalized body ache, nausea, vomiting. Sepsis indicatorstachycardia, tachypnea, fever. CT abdomen/pelvisno acute abnormality. Chest x-rayNo focal airspace process. Mild cardiomegaly with probable pulmonary  arterial enlargement. Has a similar presentation in 2021 when the patient has persistent bacteremia and got ICD removed. TTE showed mitral valve thickening. tagged WBC Scan did not show abnormal uptake. US chest did not show localized fluid collection. Duplex of HD AVG did not show evidence of infection. CT Maxillofacial   revealed possible periapical dental abscesses in multiple teeth. EVER did not show vegetations.    -cont' IV rocephin. Final abx choice/duration as per ID. Once final recs are made, he will need IV access for home IV abx.  -repeat Bcx  NTD  - ID, vascular surgery evaluated, signed off on   - ID recommended evaluation from dental surgeon. Previous attending left messages for Dr. Patrick Grey, Dental surgeon. At this point, he will need to be evaluated by dental surgeon outpt. End-stage renal disease on dialysis POA  Follows up with Dr Siria Yu at [facility]     Nephrology following on TTS schedule. Continue with the PhosLo and calcitriol. Acute on chronic anemia  -S/p 1 unit of PRBC. Repeat cbc in the AM     History of DVT with graft thrombosis  -Continue Coumadin. Pharmacy managing warfarin. INR is 1.7.      Hypertension  GERD  CAD  History of DVT and graft thromboses on Coumadin  History of HIT  ICMP s/p ICP placement  Continue metoprolol, PPI, aspirin, Lipitor  Pharmacy managing Coumadin dosing  -daily INR     18.5 - 24.9 Normal weight / Body mass index is 22.37 kg/m². Estimated discharge date: 11/15  Barriers: Clinical improvement, treatment of bacteremia        Code Status: Full code  Surrogate Decision Maker:   DVT Prophylaxis: Coumadin  GI Prophylaxis: not indicated   Baseline: Ambulatory at home              Subjective:     Chief Complaint / Reason for Physician Visit  Pt is awake, NAD. Discussed with RN events overnight. Review of Systems:  No fevers, chills, appetite change, cough, sputum production, shortness of breath, dyspnea on exertion, nausea, vomitting, diarrhea, constipation, chest pain, leg edema, abdominal pain, joint pain, rash, itching. Tolerating PT/OT. Tolerating diet. Objective:     VITALS:   Last 24hrs VS reviewed since prior progress note.  Most recent are:  Patient Vitals for the past 24 hrs:   Temp Pulse Resp BP SpO2   11/14/21 0818 98.3 °F (36.8 °C) 88  134/80 98 %   11/14/21 0327 98.3 °F (36.8 °C) 98 17 127/79 100 %   11/13/21 2332 98 °F (36.7 °C) 66 16 (!) 130/55 100 %   11/13/21 2058 98.3 °F (36.8 °C) 83 17 (!) 141/93 100 %   11/13/21 2006 98.6 °F (37 °C) 79 18 (!) 139/90    11/13/21 2000  74 18 (!) 140/80    11/13/21 1945  73 18 136/89    11/13/21 1930  80 18 (!) 143/94    11/13/21 1915  63 18 135/77    11/13/21 1900  78 18 128/81    11/13/21 1845  75 18 125/81    11/13/21 1830  79 18 133/89    11/13/21 1815  83 18 138/87    11/13/21 1800  69 18 124/74    11/13/21 1745  80 18 124/76    11/13/21 1736    138/87    11/13/21 1730  85 18 (!) 143/89    11/13/21 1715  83 18 138/87    11/13/21 1700  79 18 134/84    11/13/21 1645  86 18 139/85    11/13/21 1636 98.7 °F (37.1 °C) 88 19 137/88 100 %   11/13/21 1159 98.5 °F (36.9 °C) 87 19 124/78 100 %       Intake/Output Summary (Last 24 hours) at 11/14/2021 1006  Last data filed at 11/13/2021 2006  Gross per 24 hour   Intake    Output 2000 ml   Net -2000 ml        PHYSICAL EXAM:  General: WD, WN. Alert, cooperative, no acute distress    EENT:  EOMI. Anicteric sclerae. MMM  Resp:  CTA bilaterally, no wheezing or rales. No accessory muscle use  CV:  Regular  rhythm,  normal S1/S2, no murmurs rubs gallops, No edema  GI:  Soft, Non distended, Non tender. +Bowel sounds  Neurologic:  Alert and oriented X 3, normal speech,   Psych:   Good insight. Not anxious nor agitated  Skin:  No rashes. No jaundice    Reviewed most current lab test results and cultures  YES  Reviewed most current radiology test results   YES  Review and summation of old records today    NO  Reviewed patient's current orders and MAR    YES  PMH/ reviewed - no change compared to H&P  ________________________________________________________________________  Care Plan discussed with:    Comments   Patient x    Family      RN x    Care Manager     Consultant                        Multidiciplinary team rounds were held today with , nursing, pharmacist and clinical coordinator. Patient's plan of care was discussed; medications were reviewed and discharge planning was addressed. ________________________________________________________________________  Total NON critical care TIME: 35  Minutes    Total CRITICAL CARE TIME Spent:   Minutes non procedure based      Comments   >50% of visit spent in counseling and coordination of care x    ________________________________________________________________________  Jossy Omalley MD     Procedures: see electronic medical records for all procedures/Xrays and details which were not copied into this note but were reviewed prior to creation of Plan. LABS:  I reviewed today's most current labs and imaging studies.   Pertinent labs include:  No results for input(s): WBC, HGB, HCT, PLT, HGBEXT, HCTEXT, PLTEXT, HGBEXT, HCTEXT, PLTEXT in the last 72 hours.   Recent Labs     11/14/21  0034 11/13/21  1730 11/12/21  1535   ALB  --  2.4*  --    TBILI  --  0.3  --    ALT  --  7*  --    INR 1.7* 1.7* 1.8*       Signed: Lesa Freeman MD

## 2021-11-14 NOTE — PROGRESS NOTES
End of Shift Note    Bedside shift change report given to [place] (oncoming nurse) by Nahed Sheikh (offgoing nurse). Report included the following information SBAR, Kardex, Intake/Output, MAR and Recent Results    Shift worked:  3058-7352     Shift summary and any significant changes:     No significant changes. Pt requesting pain medication often. Is setting alarms to let him know when to request it again.      Concerns for physician to address:  pain regimen     Zone phone for oncoming shift:              Nahed Sheikh

## 2021-11-15 ENCOUNTER — APPOINTMENT (OUTPATIENT)
Dept: GENERAL RADIOLOGY | Age: 46
DRG: 853 | End: 2021-11-15
Attending: SURGERY
Payer: MEDICARE

## 2021-11-15 ENCOUNTER — ANESTHESIA (OUTPATIENT)
Dept: SURGERY | Age: 46
DRG: 853 | End: 2021-11-15
Payer: MEDICARE

## 2021-11-15 ENCOUNTER — ANESTHESIA EVENT (OUTPATIENT)
Dept: SURGERY | Age: 46
DRG: 853 | End: 2021-11-15
Payer: MEDICARE

## 2021-11-15 LAB
ANION GAP SERPL CALC-SCNC: 8 MMOL/L (ref 5–15)
BASOPHILS # BLD: 0.2 K/UL (ref 0–0.1)
BASOPHILS NFR BLD: 2 % (ref 0–1)
BUN SERPL-MCNC: 25 MG/DL (ref 6–20)
BUN/CREAT SERPL: 3 (ref 12–20)
CALCIUM SERPL-MCNC: 9.9 MG/DL (ref 8.5–10.1)
CHLORIDE SERPL-SCNC: 96 MMOL/L (ref 97–108)
CO2 SERPL-SCNC: 28 MMOL/L (ref 21–32)
CREAT SERPL-MCNC: 7.7 MG/DL (ref 0.7–1.3)
DIFFERENTIAL METHOD BLD: ABNORMAL
EOSINOPHIL # BLD: 0 K/UL (ref 0–0.4)
EOSINOPHIL NFR BLD: 0 % (ref 0–7)
ERYTHROCYTE [DISTWIDTH] IN BLOOD BY AUTOMATED COUNT: 17.7 % (ref 11.5–14.5)
GLUCOSE SERPL-MCNC: 89 MG/DL (ref 65–100)
HCT VFR BLD AUTO: 25.4 % (ref 36.6–50.3)
HGB BLD-MCNC: 7.8 G/DL (ref 12.1–17)
IMM GRANULOCYTES # BLD AUTO: 0 K/UL (ref 0–0.04)
IMM GRANULOCYTES NFR BLD AUTO: 0 % (ref 0–0.5)
INR PPP: 1.6 (ref 0.9–1.1)
LYMPHOCYTES # BLD: 1.4 K/UL (ref 0.8–3.5)
LYMPHOCYTES NFR BLD: 13 % (ref 12–49)
MCH RBC QN AUTO: 29.9 PG (ref 26–34)
MCHC RBC AUTO-ENTMCNC: 30.7 G/DL (ref 30–36.5)
MCV RBC AUTO: 97.3 FL (ref 80–99)
MONOCYTES # BLD: 0.4 K/UL (ref 0–1)
MONOCYTES NFR BLD: 4 % (ref 5–13)
NEUTS SEG # BLD: 8.9 K/UL (ref 1.8–8)
NEUTS SEG NFR BLD: 81 % (ref 32–75)
NRBC # BLD: 0 K/UL (ref 0–0.01)
NRBC BLD-RTO: 0 PER 100 WBC
PLATELET # BLD AUTO: 173 K/UL (ref 150–400)
PMV BLD AUTO: 10.8 FL (ref 8.9–12.9)
POTASSIUM SERPL-SCNC: 4.9 MMOL/L (ref 3.5–5.1)
PROTHROMBIN TIME: 16.7 SEC (ref 9–11.1)
RBC # BLD AUTO: 2.61 M/UL (ref 4.1–5.7)
RBC MORPH BLD: ABNORMAL
RBC MORPH BLD: ABNORMAL
SODIUM SERPL-SCNC: 132 MMOL/L (ref 136–145)
WBC # BLD AUTO: 10.9 K/UL (ref 4.1–11.1)

## 2021-11-15 PROCEDURE — 74011250636 HC RX REV CODE- 250/636: Performed by: STUDENT IN AN ORGANIZED HEALTH CARE EDUCATION/TRAINING PROGRAM

## 2021-11-15 PROCEDURE — 99233 SBSQ HOSP IP/OBS HIGH 50: CPT | Performed by: INTERNAL MEDICINE

## 2021-11-15 PROCEDURE — 74011250636 HC RX REV CODE- 250/636: Performed by: SURGERY

## 2021-11-15 PROCEDURE — B31J1ZZ FLUOROSCOPY OF LEFT UPPER EXTREMITY ARTERIES USING LOW OSMOLAR CONTRAST: ICD-10-PCS | Performed by: SURGERY

## 2021-11-15 PROCEDURE — 74011250636 HC RX REV CODE- 250/636: Performed by: INTERNAL MEDICINE

## 2021-11-15 PROCEDURE — 77030002916 HC SUT ETHLN J&J -A: Performed by: SURGERY

## 2021-11-15 PROCEDURE — 77030003703 HC NDL VASC ACC COOK -A: Performed by: SURGERY

## 2021-11-15 PROCEDURE — 74011250636 HC RX REV CODE- 250/636: Performed by: REGISTERED NURSE

## 2021-11-15 PROCEDURE — 36415 COLL VENOUS BLD VENIPUNCTURE: CPT

## 2021-11-15 PROCEDURE — C1892 INTRO/SHEATH,FIXED,PEEL-AWAY: HCPCS | Performed by: SURGERY

## 2021-11-15 PROCEDURE — 65270000029 HC RM PRIVATE

## 2021-11-15 PROCEDURE — 85610 PROTHROMBIN TIME: CPT

## 2021-11-15 PROCEDURE — 74011000258 HC RX REV CODE- 258: Performed by: STUDENT IN AN ORGANIZED HEALTH CARE EDUCATION/TRAINING PROGRAM

## 2021-11-15 PROCEDURE — 2709999900 HC NON-CHARGEABLE SUPPLY

## 2021-11-15 PROCEDURE — 80048 BASIC METABOLIC PNL TOTAL CA: CPT

## 2021-11-15 PROCEDURE — 74011000250 HC RX REV CODE- 250: Performed by: SURGERY

## 2021-11-15 PROCEDURE — 74011250637 HC RX REV CODE- 250/637: Performed by: SURGERY

## 2021-11-15 PROCEDURE — 76060000032 HC ANESTHESIA 0.5 TO 1 HR: Performed by: SURGERY

## 2021-11-15 PROCEDURE — 85025 COMPLETE CBC W/AUTO DIFF WBC: CPT

## 2021-11-15 PROCEDURE — 74011000250 HC RX REV CODE- 250: Performed by: REGISTERED NURSE

## 2021-11-15 PROCEDURE — 76210000006 HC OR PH I REC 0.5 TO 1 HR: Performed by: SURGERY

## 2021-11-15 PROCEDURE — 77030031139 HC SUT VCRL2 J&J -A: Performed by: SURGERY

## 2021-11-15 PROCEDURE — 74011250636 HC RX REV CODE- 250/636: Performed by: ANESTHESIOLOGY

## 2021-11-15 PROCEDURE — C1769 GUIDE WIRE: HCPCS | Performed by: SURGERY

## 2021-11-15 PROCEDURE — 76010000138 HC OR TIME 0.5 TO 1 HR: Performed by: SURGERY

## 2021-11-15 PROCEDURE — 74011000636 HC RX REV CODE- 636: Performed by: SURGERY

## 2021-11-15 PROCEDURE — 77030013058 HC DEV INFL ANGIO BSC -B: Performed by: SURGERY

## 2021-11-15 PROCEDURE — 73060 X-RAY EXAM OF HUMERUS: CPT

## 2021-11-15 PROCEDURE — 05763ZZ DILATION OF LEFT SUBCLAVIAN VEIN, PERCUTANEOUS APPROACH: ICD-10-PCS | Performed by: SURGERY

## 2021-11-15 PROCEDURE — 74011250637 HC RX REV CODE- 250/637: Performed by: STUDENT IN AN ORGANIZED HEALTH CARE EDUCATION/TRAINING PROGRAM

## 2021-11-15 PROCEDURE — 76000 FLUOROSCOPY <1 HR PHYS/QHP: CPT

## 2021-11-15 PROCEDURE — 2709999900 HC NON-CHARGEABLE SUPPLY: Performed by: SURGERY

## 2021-11-15 RX ORDER — ONDANSETRON 2 MG/ML
4 INJECTION INTRAMUSCULAR; INTRAVENOUS AS NEEDED
Status: DISCONTINUED | OUTPATIENT
Start: 2021-11-15 | End: 2021-11-15 | Stop reason: HOSPADM

## 2021-11-15 RX ORDER — DIPHENHYDRAMINE HYDROCHLORIDE 50 MG/ML
INJECTION, SOLUTION INTRAMUSCULAR; INTRAVENOUS AS NEEDED
Status: DISCONTINUED | OUTPATIENT
Start: 2021-11-15 | End: 2021-11-15 | Stop reason: HOSPADM

## 2021-11-15 RX ORDER — SODIUM CHLORIDE 0.9 % (FLUSH) 0.9 %
5-40 SYRINGE (ML) INJECTION AS NEEDED
Status: DISCONTINUED | OUTPATIENT
Start: 2021-11-15 | End: 2021-11-15

## 2021-11-15 RX ORDER — SODIUM CHLORIDE 0.9 % (FLUSH) 0.9 %
5-40 SYRINGE (ML) INJECTION AS NEEDED
Status: DISCONTINUED | OUTPATIENT
Start: 2021-11-15 | End: 2021-11-15 | Stop reason: HOSPADM

## 2021-11-15 RX ORDER — LIDOCAINE HYDROCHLORIDE 20 MG/ML
INJECTION, SOLUTION EPIDURAL; INFILTRATION; INTRACAUDAL; PERINEURAL AS NEEDED
Status: DISCONTINUED | OUTPATIENT
Start: 2021-11-15 | End: 2021-11-15 | Stop reason: HOSPADM

## 2021-11-15 RX ORDER — FENTANYL CITRATE 50 UG/ML
25 INJECTION, SOLUTION INTRAMUSCULAR; INTRAVENOUS
Status: DISCONTINUED | OUTPATIENT
Start: 2021-11-15 | End: 2021-11-15 | Stop reason: HOSPADM

## 2021-11-15 RX ORDER — SODIUM CHLORIDE 9 MG/ML
25 INJECTION, SOLUTION INTRAVENOUS CONTINUOUS
Status: DISCONTINUED | OUTPATIENT
Start: 2021-11-16 | End: 2021-11-15

## 2021-11-15 RX ORDER — SODIUM CHLORIDE 0.9 % (FLUSH) 0.9 %
5-40 SYRINGE (ML) INJECTION EVERY 8 HOURS
Status: DISCONTINUED | OUTPATIENT
Start: 2021-11-15 | End: 2021-11-15 | Stop reason: HOSPADM

## 2021-11-15 RX ORDER — SODIUM CHLORIDE 0.9 % (FLUSH) 0.9 %
5-40 SYRINGE (ML) INJECTION EVERY 8 HOURS
Status: DISCONTINUED | OUTPATIENT
Start: 2021-11-15 | End: 2021-11-15

## 2021-11-15 RX ORDER — CEFAZOLIN SODIUM 1 G/3ML
2 INJECTION, POWDER, FOR SOLUTION INTRAMUSCULAR; INTRAVENOUS ONCE
Status: DISCONTINUED | OUTPATIENT
Start: 2021-11-15 | End: 2021-11-15 | Stop reason: CLARIF

## 2021-11-15 RX ORDER — FENTANYL CITRATE 50 UG/ML
INJECTION, SOLUTION INTRAMUSCULAR; INTRAVENOUS AS NEEDED
Status: DISCONTINUED | OUTPATIENT
Start: 2021-11-15 | End: 2021-11-15 | Stop reason: HOSPADM

## 2021-11-15 RX ORDER — PROPOFOL 10 MG/ML
INJECTION, EMULSION INTRAVENOUS AS NEEDED
Status: DISCONTINUED | OUTPATIENT
Start: 2021-11-15 | End: 2021-11-15 | Stop reason: HOSPADM

## 2021-11-15 RX ORDER — MIDAZOLAM HYDROCHLORIDE 1 MG/ML
INJECTION, SOLUTION INTRAMUSCULAR; INTRAVENOUS AS NEEDED
Status: DISCONTINUED | OUTPATIENT
Start: 2021-11-15 | End: 2021-11-15 | Stop reason: HOSPADM

## 2021-11-15 RX ORDER — VANCOMYCIN/0.9 % SOD CHLORIDE 1.5G/250ML
1500 PLASTIC BAG, INJECTION (ML) INTRAVENOUS ONCE
Status: COMPLETED | OUTPATIENT
Start: 2021-11-15 | End: 2021-11-15

## 2021-11-15 RX ADMIN — WATER 2 G: 1 INJECTION INTRAMUSCULAR; INTRAVENOUS; SUBCUTANEOUS at 15:10

## 2021-11-15 RX ADMIN — Medication 3 AMPULE: at 14:29

## 2021-11-15 RX ADMIN — PROPOFOL 50 MG: 10 INJECTION, EMULSION INTRAVENOUS at 15:15

## 2021-11-15 RX ADMIN — VANCOMYCIN HYDROCHLORIDE 1500 MG: 10 INJECTION, POWDER, LYOPHILIZED, FOR SOLUTION INTRAVENOUS at 21:07

## 2021-11-15 RX ADMIN — CEFTRIAXONE SODIUM 2 G: 2 INJECTION, POWDER, FOR SOLUTION INTRAMUSCULAR; INTRAVENOUS at 13:13

## 2021-11-15 RX ADMIN — METHYLPREDNISOLONE SODIUM SUCCINATE 125 MG: 125 INJECTION, POWDER, FOR SOLUTION INTRAMUSCULAR; INTRAVENOUS at 15:17

## 2021-11-15 RX ADMIN — HYDROMORPHONE HYDROCHLORIDE 1 MG: 1 INJECTION, SOLUTION INTRAMUSCULAR; INTRAVENOUS; SUBCUTANEOUS at 08:34

## 2021-11-15 RX ADMIN — MIDAZOLAM HYDROCHLORIDE 2 MG: 1 INJECTION, SOLUTION INTRAMUSCULAR; INTRAVENOUS at 15:01

## 2021-11-15 RX ADMIN — Medication 10 ML: at 21:07

## 2021-11-15 RX ADMIN — HYDROMORPHONE HYDROCHLORIDE 1 MG: 1 INJECTION, SOLUTION INTRAMUSCULAR; INTRAVENOUS; SUBCUTANEOUS at 18:03

## 2021-11-15 RX ADMIN — CALCIUM ACETATE 1334 MG: 667 CAPSULE ORAL at 18:03

## 2021-11-15 RX ADMIN — Medication 10 ML: at 06:03

## 2021-11-15 RX ADMIN — PROPOFOL 75 MCG/KG/MIN: 10 INJECTION, EMULSION INTRAVENOUS at 15:16

## 2021-11-15 RX ADMIN — LIDOCAINE HYDROCHLORIDE 60 MG: 20 INJECTION, SOLUTION EPIDURAL; INFILTRATION; INTRACAUDAL; PERINEURAL at 15:08

## 2021-11-15 RX ADMIN — HYDROMORPHONE HYDROCHLORIDE 1 MG: 1 INJECTION, SOLUTION INTRAMUSCULAR; INTRAVENOUS; SUBCUTANEOUS at 03:37

## 2021-11-15 RX ADMIN — METOPROLOL SUCCINATE 12.5 MG: 25 TABLET, EXTENDED RELEASE ORAL at 08:33

## 2021-11-15 RX ADMIN — Medication 10 ML: at 13:16

## 2021-11-15 RX ADMIN — SODIUM CHLORIDE 25 ML/HR: 9 INJECTION, SOLUTION INTRAVENOUS at 14:44

## 2021-11-15 RX ADMIN — DIPHENHYDRAMINE HYDROCHLORIDE 50 MG: 50 INJECTION, SOLUTION INTRAMUSCULAR; INTRAVENOUS at 15:15

## 2021-11-15 RX ADMIN — FENTANYL CITRATE 50 MCG: 50 INJECTION, SOLUTION INTRAMUSCULAR; INTRAVENOUS at 15:31

## 2021-11-15 RX ADMIN — LIDOCAINE HYDROCHLORIDE 60 MG: 20 INJECTION, SOLUTION EPIDURAL; INFILTRATION; INTRACAUDAL; PERINEURAL at 15:30

## 2021-11-15 RX ADMIN — Medication 10 ML: at 18:04

## 2021-11-15 NOTE — PROGRESS NOTES
Alert and orientd x4, assessments and VS completed per orders, medications administered, pain reported by patient, PRN medication provided and effective

## 2021-11-15 NOTE — PROGRESS NOTES
Hospitalist Progress Note    NAME: Lucho De Luna   :  1975   MRN:  [id number]   Room Number:  2134/01  @ [facility]            Assessment / Plan:       Estimated discharge date:   Barriers: Clinical improvement,dental surgery evaluation, treatment of bacteremia         Coag negative staph aureus bacteremia   Streptococcus intermedius bacteremia  bottles  Sepsis  History of AV graft  -Presented with generalized body ache, nausea, vomiting. Sepsis indicatorstachycardia, tachypnea, fever. CT abdomen/pelvisno acute abnormality.  -Chest x-rayNo focal airspace process. Mild cardiomegaly with probable pulmonary  arterial enlargement. Has a similar presentation in 2021 when the patient has persistent bacteremia and got ICD removed. TTE showed mitral valve thickening. tagged WBC Scan did not show abnormal uptake. US chest did not show localized fluid collection. Duplex of HD AVG did not show evidence of infection. CT Maxillofacial   revealed possible periapical dental abscesses in multiple teeth. EVER did not show vegetations.    -cont' IV rocephin. Final abx choice/duration as per ID. Once final recs are made, he will need IV access for home IV abx.  -repeat Bcx  NTD  - ID, vascular surgery evaluated, vascular surgery felt AVG is not infected, plans for fistulogram today  - ID recommended evaluation from dental surgeon. Previous attending left messages for Dr. Leighton Turpin, Dental surgeon. I left another message today . if abscess drainage is not visible to be done as an inpatient, transfer to oral surgeon office should be scheduled immediately upon discharge. End-stage renal disease on dialysis POA  Follows up with Dr Stella Che at [facility]     Nephrology following on TTS schedule. Continue with the PhosLo and calcitriol. Acute on chronic anemia  -S/p 1 unit of PRBC.   Repeat cbc in the AM     History of DVT with graft thrombosis  -Continue Coumadin. Pharmacy managing warfarin. INR is 1.7. Hypertension  GERD  CAD  History of DVT and graft thromboses on Coumadin  History of HIT  ICMP s/p ICP placement  Continue metoprolol, PPI, aspirin, Lipitor  Pharmacy managing Coumadin dosing  -daily INR     18.5 - 24.9 Normal weight / Body mass index is 22.37 kg/m². Estimated discharge date: 11/15  Barriers: Clinical improvement, treatment of bacteremia        Code Status: Full code  Surrogate Decision Maker:   DVT Prophylaxis: Coumadin  GI Prophylaxis: not indicated   Baseline: Ambulatory at home              Subjective:     Patient was seen and examined. No acute events overnight. Discussed with RN overnight events. All patient's questions were answered. \"feeling the same from yesterday\"    Review of Systems:  Symptom Y/N Comments  Symptom Y/N Comments   Fever/Chills n   Chest Pain n    Poor Appetite    Edema     Cough n   Abdominal Pain n    Sputum    Joint Pain     SOB/ZAMUDIO n   Pruritis/Rash     Nausea/vomit n   Tolerating PT/OT     Diarrhea    Tolerating Diet y    Constipation    Other       Could NOT obtain due to:            Objective:     VITALS:   Last 24hrs VS reviewed since prior progress note. Most recent are:  Patient Vitals for the past 24 hrs:   Temp Pulse Resp BP SpO2   11/15/21 1141 98.7 °F (37.1 °C) 85 14 (!) 135/96 96 %   11/15/21 0735 98.1 °F (36.7 °C) 88 14 120/74 99 %   11/15/21 0250 98.6 °F (37 °C) 94 16 132/76 100 %   11/14/21 2259 98.5 °F (36.9 °C) 93 16 122/74 100 %   11/14/21 1950 98.9 °F (37.2 °C) 93 16 (!) 133/92 100 %   11/14/21 1510 98.2 °F (36.8 °C) 91  122/87 100 %   11/14/21 1412  84  (!) 129/90 100 %     No intake or output data in the 24 hours ending 11/15/21 1147     PHYSICAL EXAM:  General: WD, WN. Alert, cooperative, no acute distress    EENT:  EOMI. Anicteric sclerae. MMM  Resp:  CTA bilaterally, no wheezing or rales.   No accessory muscle use  CV:  Regular  rhythm,  normal S1/S2, no murmurs rubs gallops, No edema  GI:  Soft, Non distended, Non tender. +Bowel sounds  Neurologic:  Alert and oriented X 3, normal speech,   Psych:   Good insight. Not anxious nor agitated  Skin:  No rashes. No jaundice    Reviewed most current lab test results and cultures  YES  Reviewed most current radiology test results   YES  Review and summation of old records today    NO  Reviewed patient's current orders and MAR    YES  PMH/SH reviewed - no change compared to H&P  ________________________________________________________________________  Care Plan discussed with:    Comments   Patient x    Family      RN x    Care Manager     Consultant                        Multidiciplinary team rounds were held today with , nursing, pharmacist and clinical coordinator. Patient's plan of care was discussed; medications were reviewed and discharge planning was addressed. ________________________________________________________________________  Total NON critical care TIME: 35  Minutes    Total CRITICAL CARE TIME Spent:   Minutes non procedure based      Comments   >50% of visit spent in counseling and coordination of care x    ________________________________________________________________________  Cheyenne Mcclure MD     Procedures: see electronic medical records for all procedures/Xrays and details which were not copied into this note but were reviewed prior to creation of Plan. LABS:  I reviewed today's most current labs and imaging studies.   Pertinent labs include:  Recent Labs     11/15/21  0404   WBC 10.9   HGB 7.8*   HCT 25.4*        Recent Labs     11/15/21  0404 11/14/21  0034 11/13/21  1730   *  --   --    K 4.9  --   --    CL 96*  --   --    CO2 28  --   --    GLU 89  --   --    BUN 25*  --   --    CREA 7.70*  --   --    CA 9.9  --   --    ALB  --   --  2.4*   TBILI  --   --  0.3   ALT  --   --  7*   INR 1.6* 1.7* 1.7*       Signed: Cheyenne Mcclure MD

## 2021-11-15 NOTE — PERIOP NOTES
TRANSFER - OUT REPORT:    Verbal report given to Luca Lezama RN(name) on Monico Sosa  being transferred to 2134(unit) for routine post - op       Report consisted of patients Situation, Background, Assessment and   Recommendations(SBAR). Information from the following report(s) SBAR, Kardex, OR Summary, Intake/Output, MAR, Recent Results and Cardiac Rhythm NSR was reviewed with the receiving nurse. Opportunity for questions and clarification was provided.       Patient transported with:   Monitor  O2 @ 2 liters  Registered Nurse  [facility]

## 2021-11-15 NOTE — ANESTHESIA POSTPROCEDURE EVALUATION
Procedure(s):  ANGIOGRAM & DIALYSIS GRAFT, ANGIOPLASTY OF LEFT SUBCLAVIAN VEIN. MAC, general - backup    Anesthesia Post Evaluation      Multimodal analgesia: multimodal analgesia used between 6 hours prior to anesthesia start to PACU discharge  Patient location during evaluation: PACU  Patient participation: complete - patient participated  Level of consciousness: sleepy but conscious  Pain management: adequate  Airway patency: patent  Anesthetic complications: no  Cardiovascular status: acceptable, hemodynamically stable and blood pressure returned to baseline  Respiratory status: acceptable and nasal cannula  Hydration status: euvolemic  Post anesthesia nausea and vomiting:  none  Final Post Anesthesia Temperature Assessment:  Normothermia (36.0-37.5 degrees C)      INITIAL Post-op Vital signs:   Vitals Value Taken Time   /88 11/15/21 1645   Temp 36.5 °C (97.7 °F) 11/15/21 1645   Pulse 89 11/15/21 1647   Resp 18 11/15/21 1647   SpO2 100 % 11/15/21 1647   Vitals shown include unvalidated device data.

## 2021-11-15 NOTE — PROGRESS NOTES
Nephrology Progress Note  Blaise Leblanc     www. [facility]  Phone - [phone]   Patient: Padmini Mehta    YOB: 1975        Date- 11/15/2021   Admit Date: 11/5/2021  CC: Follow up for ESRD         IMPRESSION & PLAN:   · ESRD- home HD 5 days per week- Follows up with Dr Vale Obregon at [facility]  · Sec. hyperpara  · Failed RTX times 2  · Hyperkalemia  · Gram positive sepsis-Coag negative staph aureus bacteremia 1/4  · Streptococcus intermedius bacteremia 4/4 bottles  · s/p lead extraction at [facility]  · Anemia of ckd  · Hx of renal tx in past times 2.  · Hypertension  · CAD         H/o DVT, s/p ivc filter/ h/o HIT     PLAN-   NO HD TODAY   Continue calcitriol   epogen for anemia   Continue phoslo   Will keep him on TTS schedule for now   He will go to inpatient hd unit if he needs abx after discharge. Will follow with you. Subjective: Interval History:   -bp stable  Last hd on saturday      Objective:   Vitals:    11/14/21 1950 11/14/21 2259 11/15/21 0250 11/15/21 0735   BP: (!) 133/92 122/74 132/76 120/74   Pulse: 93 93 94 88   Resp: 16 16 16 14   Temp: 98.9 °F (37.2 °C) 98.5 °F (36.9 °C) 98.6 °F (37 °C) 98.1 °F (36.7 °C)   TempSrc:       SpO2: 100% 100% 100% 99%   Weight:       Height:          No intake/output data recorded. Last 3 Recorded Weights in this Encounter    11/07/21 1515 11/07/21 2137 11/11/21 1213   Weight: 68.9 kg (152 lb) 64.8 kg (142 lb 12.8 oz) 60 kg (132 lb 4.4 oz)      Physical exam:   GEN:NAD  NECK- Supple, no mass  RESP: No wheezing,CLEAR b/l  CVS: S1,S2  RRR  NEURO: Normal speech, NON FOCAL  EXT: No Edema   PSYCH: Normal Mood      Chart reviewed. Pertinent Notes reviewed.      Data Review :  Recent Labs     11/15/21  0404   *   K 4.9   CL 96*   CO2 28   BUN 25*   CREA 7.70*   GLU 89   CA 9.9     Recent Labs     11/15/21  0404   WBC 10.9   HGB 7.8*   HCT 25.4*        No results for input(s): FE, TIBC, PSAT, FERR in the last 72 hours.    Medication list  reviewed  Current Facility-Administered Medications   Medication Dose Route Frequency    cefTRIAXone (ROCEPHIN) 2 g in 0.9% sodium chloride (MBP/ADV) 50 mL MBP  2 g IntraVENous Q24H    0.9% sodium chloride infusion 250 mL  250 mL IntraVENous PRN    oxyCODONE IR (ROXICODONE) tablet 5 mg  5 mg Oral Q4H PRN    HYDROmorphone (DILAUDID) injection 1 mg  1 mg IntraVENous Q4H PRN    WARFARIN INFORMATION NOTE (COUMADIN)   Other QPM    aspirin chewable tablet 81 mg  81 mg Oral DAILY    calcitRIOL (ROCALTROL) capsule 0.5 mcg  0.5 mcg Oral DAILY    atorvastatin (LIPITOR) tablet 20 mg  20 mg Oral DAILY    calcium acetate(phosphat bind) (PHOSLO) capsule 1,334 mg  2 Capsule Oral TID WITH MEALS    metoprolol succinate (TOPROL-XL) XL tablet 12.5 mg  12.5 mg Oral DAILY    pantoprazole (PROTONIX) tablet 40 mg  40 mg Oral ACB    sodium chloride (NS) flush 5-40 mL  5-40 mL IntraVENous Q8H    sodium chloride (NS) flush 5-40 mL  5-40 mL IntraVENous PRN    acetaminophen (TYLENOL) tablet 650 mg  650 mg Oral Q6H PRN    Or    acetaminophen (TYLENOL) suppository 650 mg  650 mg Rectal Q6H PRN    polyethylene glycol (MIRALAX) packet 17 g  17 g Oral DAILY PRN    ondansetron (ZOFRAN ODT) tablet 4 mg  4 mg Oral Q8H PRN    Or    ondansetron (ZOFRAN) injection 4 mg  4 mg IntraVENous Q6H PRN          Winsome Kaye MD              [facility]  [facility], [address]  [place], [address]  Phone - [phone]               Fax - [phone]

## 2021-11-15 NOTE — PERIOP NOTES
TRANSFER - IN REPORT:    Verbal report received from MANUELITO Pascal on Clarene Lay  being received from 2134 for ordered procedure      Report consisted of patients Situation, Background, Assessment and   Recommendations(SBAR). Information from the following report(s) SBAR, Kardex, Intake/Output, MAR and Recent Results was reviewed with the receiving nurse. Opportunity for questions and clarification was provided. Assessment completed upon patients arrival to unit and care assumed.

## 2021-11-15 NOTE — INTERDISCIPLINARY ROUNDS
Interdisciplinary Rounds were completed on 11/15/21 for this patient. Rounds included nursing, clinical care leader, pharmacy, and case management. Plan of care discussed. See clinical pathway and/or care plan for interventions and desired outcomes. jessieer , PH

## 2021-11-15 NOTE — PERIOP NOTES
Handoff Report from Operating Room to PACU    Report received from Bill Machuca  and Narayan Caba CRNA regarding Lyndee Esters. Surgeon(s):  Eve Morgan MD  And Procedure(s) (LRB):  ANGIOGRAM, POSSIBLE ANGIOPLASTY LEFT ARM DIALYSIS GRAFT (URGENT) (Left)  confirmed   with allergies and dressings discussed. Anesthesia type, drugs, patient history, complications, estimated blood loss, vital signs, intake and output, and last pain medication were reviewed.

## 2021-11-15 NOTE — BRIEF OP NOTE
Brief Postoperative Note    Patient: Rachelle Carlson  YOB: 1975  MRN: [id number]    Date of Procedure: 11/15/2021     Pre-Op Diagnosis: LEFT ARM SWELLING DUE TO CENTRAL VENOUS STENOSIS    Post-Op Diagnosis: SAME      Procedure(s):  ANGIOGRAM, POSSIBLE ANGIOPLASTY LEFT ARM DIALYSIS GRAFT (URGENT)    Surgeon(s):  Cherylene Collet, MD    Surgical Assistant: Surg Asst-1: Yen Bran    Anesthesia: MAC     Estimated Blood Loss (mL): Minimal    Complications: None    Specimens: * No specimens in log *     Implants: * No implants in log *    Drains: * No LDAs found *    Findings: 80% left subclavian vein stenosis angioplastied with 12x4 balloon. Arm swelling should slowly improve. OK to use AVG.     Electronically Signed by Caity Umaña MD on 11/15/2021 at 4:03 PM

## 2021-11-15 NOTE — ANESTHESIA PREPROCEDURE EVALUATION
Relevant Problems   RESPIRATORY SYSTEM   (+) Pneumonia      CARDIOVASCULAR   (+) CAD (coronary artery disease)   (+) Congestive heart failure, unspecified   (+) Coronary atherosclerosis of native coronary artery   (+) HTN (hypertension)      RENAL FAILURE   (+) ESRD (end stage renal disease) (HCC)   (+) ESRD (end stage renal disease) on dialysis (HCC)      HEMATOLOGY   (+) Anemia       Anesthetic History     PONV          Review of Systems / Medical History  Patient summary reviewed, nursing notes reviewed and pertinent labs reviewed    Pulmonary  Within defined limits                 Neuro/Psych   Within defined limits  seizures         Cardiovascular  Within defined limits  Hypertension      CHF: NYHA Classification III  Dysrhythmias   Pacemaker, past MI, CAD and cardiac stents    Exercise tolerance: <4 METS  Comments: TTE 3/2021  · LV: Estimated LVEF is 30 - 35%. Normal cavity size. Moderately and globally reduced systolic function. Inconclusive left ventricular diastolic function. · LA: Moderately dilated left atrium. · MV: Mild mitral valve regurgitation is present. · PA: Mild pulmonary hypertension. Pulmonary arterial systolic pressure is 41 mmHg. · There is a mobile structure in the left ventricle of unclear etiology. Consider EVER if clinically indicated    EVER  · No signs of endocarditis by EVER. · Mobile structure seen on TTE unclear, may have been artifact vs. left sided moderator band, the sub valvular mitral apparatus does show some calcium in the pappillary muscle and cords, but no signs of endocarditis.     EKG  Normal sinus rhythm   T wave abnormality, consider inferior ischemia   Prolonged QT    GI/Hepatic/Renal  Within defined limits       Renal disease: ESRD and dialysis       Endo/Other        Anemia     Other Findings              Physical Exam    Airway  Mallampati: II  TM Distance: 4 - 6 cm  Neck ROM: normal range of motion   Mouth opening: Normal     Cardiovascular  Regular rate and rhythm, S1 and S2 normal,  no murmur, click, rub, or gallop             Dental    Dentition: Poor dentition     Pulmonary  Breath sounds clear to auscultation               Abdominal  GI exam deferred       Other Findings            Anesthetic Plan    ASA: 4  Anesthesia type: MAC and general - backup          Induction: Intravenous  Anesthetic plan and risks discussed with: Patient

## 2021-11-15 NOTE — PERIOP NOTES
1355 - PT ARRIVED INTO PRE-OP HOLDING. PT A&O X4. DAVILA SPON AND TO COMMAND. RESP EVEN AND UNLABORED. POX ON RA > 96%. BSB COARSE, BUT DIMINISHED IN BASES. PT DENIES DISCOMFORT - 0/10 ON PAIN SCALE. PRE-OP TCHING DONE - PT VERBALIZES UNDERSTANDING.

## 2021-11-15 NOTE — OP NOTES
[facility]  OPERATIVE REPORT    Name:  Ralph Hansen  MR#:  [id number]  :  1975  ACCOUNT #:  [de-identified]  DATE OF SERVICE:  11/15/2021    PREOPERATIVE DIAGNOSIS:  Left arm swelling due to central venous stenosis. POSTOPERATIVE DIAGNOSIS:  Left arm swelling due to central venous stenosis. PROCEDURE PERFORMED:  1. Angiogram of left arm dialysis graft. 2.  Angioplasty of left subclavian vein. SURGEON:  Mali Castillo MD    ASSISTANT:  None. ANESTHESIA:  MAC.    COMPLICATIONS:  None. SPECIMENS REMOVED:  None. IMPLANTS:  None. ESTIMATED BLOOD LOSS:  Minimal.    DRAINS:  None. INDICATIONS:  The patient is a 53-year-old male with end-stage renal disease who has a left upper arm dialysis graft. He has a known history of central venous stenosis and now has a 2- to 3-week history of recurrent left arm swelling. He presents for angiography with possible intervention. PROCEDURE:  After informed consent was obtained, the patient was given intravenous antibiotics within one hour of the procedure. He was taken to the operating room and after establishing adequate sedation, the left arm was prepped and draped as a sterile field. The left upper arm graft was cannulated under local anesthesia using an arterial access needle and a 7-Divehi sheath was placed in the venous side in a downstream direction. Contrast was injected through the sheath to perform an angiogram of the graft. The venous side of the graft was normal including the venous anastomosis. The proximal brachial and axillary veins were normal.  Angiograms of the central veins were done injecting through the sheath and this showed an 80% stenosis in the proximal left subclavian vein. The innominate vein and superior vena cava were normal.  The subclavian vein stenosis was crossed with a wire and angioplastied with a 12 x 4 balloon.   Repeat angiogram showed good result with resolution of the stenosis and no significant residual stenosis. The sheath was removed and the puncture site was controlled with a 3-0 nylon figure-of-eight suture. Dry dressing was applied and the patient was transferred to the PACU in stable condition.         Daquan Cisneros MD      [id number]  D:  11/15/2021 16:11  T:  11/15/2021 18:10  JOB #:  [id number]  CC:  Emiliana Huynh MD

## 2021-11-15 NOTE — PROGRESS NOTES
I called Dr. Dilip Hill' office, he was in surgery. Office took a message for me to call me back. I provided my cell phone to be called and discuss the case. Dr Dilip Hill called me back and asked to discharge patient to his office tomorrow around noon time.     Office address:  [address] [place] [address]

## 2021-11-15 NOTE — PROGRESS NOTES
End of Shift Note    Bedside shift change report given to Naida (oncoming nurse) by Miracle Varma (offgoing nurse). Report included the following information SBAR, Kardex, Intake/Output, MAR and Recent Results    Shift worked:  [id number]     Shift summary and any significant changes:     No significant changes. Pt anticipating vascular procedure.       Concerns for physician to address:  none     Zone phone for oncoming shift:            Miracle Varma

## 2021-11-16 LAB
ALBUMIN SERPL-MCNC: 2.6 G/DL (ref 3.5–5)
ANION GAP SERPL CALC-SCNC: 10 MMOL/L (ref 5–15)
BUN SERPL-MCNC: 49 MG/DL (ref 6–20)
BUN/CREAT SERPL: 5 (ref 12–20)
CALCIUM SERPL-MCNC: 9.5 MG/DL (ref 8.5–10.1)
CHLORIDE SERPL-SCNC: 98 MMOL/L (ref 97–108)
CO2 SERPL-SCNC: 27 MMOL/L (ref 21–32)
CREAT SERPL-MCNC: 10 MG/DL (ref 0.7–1.3)
ERYTHROCYTE [DISTWIDTH] IN BLOOD BY AUTOMATED COUNT: 17.7 % (ref 11.5–14.5)
GLUCOSE SERPL-MCNC: 168 MG/DL (ref 65–100)
HCT VFR BLD AUTO: 27.9 % (ref 36.6–50.3)
HGB BLD-MCNC: 8.7 G/DL (ref 12.1–17)
INR PPP: 1.8 (ref 0.9–1.1)
MCH RBC QN AUTO: 29.7 PG (ref 26–34)
MCHC RBC AUTO-ENTMCNC: 31.2 G/DL (ref 30–36.5)
MCV RBC AUTO: 95.2 FL (ref 80–99)
NRBC # BLD: 0 K/UL (ref 0–0.01)
NRBC BLD-RTO: 0 PER 100 WBC
PHOSPHATE SERPL-MCNC: 4.3 MG/DL (ref 2.6–4.7)
PLATELET # BLD AUTO: 208 K/UL (ref 150–400)
PMV BLD AUTO: 11.7 FL (ref 8.9–12.9)
POTASSIUM SERPL-SCNC: 5.5 MMOL/L (ref 3.5–5.1)
PROTHROMBIN TIME: 18.8 SEC (ref 9–11.1)
RBC # BLD AUTO: 2.93 M/UL (ref 4.1–5.7)
SODIUM SERPL-SCNC: 135 MMOL/L (ref 136–145)
WBC # BLD AUTO: 16.6 K/UL (ref 4.1–11.1)

## 2021-11-16 PROCEDURE — 74011000258 HC RX REV CODE- 258: Performed by: INTERNAL MEDICINE

## 2021-11-16 PROCEDURE — 85610 PROTHROMBIN TIME: CPT

## 2021-11-16 PROCEDURE — 74011250637 HC RX REV CODE- 250/637: Performed by: SURGERY

## 2021-11-16 PROCEDURE — 74011000258 HC RX REV CODE- 258: Performed by: SURGERY

## 2021-11-16 PROCEDURE — 74011250637 HC RX REV CODE- 250/637: Performed by: INTERNAL MEDICINE

## 2021-11-16 PROCEDURE — 36415 COLL VENOUS BLD VENIPUNCTURE: CPT

## 2021-11-16 PROCEDURE — 74011250636 HC RX REV CODE- 250/636: Performed by: SURGERY

## 2021-11-16 PROCEDURE — 90935 HEMODIALYSIS ONE EVALUATION: CPT

## 2021-11-16 PROCEDURE — 74011250636 HC RX REV CODE- 250/636: Performed by: INTERNAL MEDICINE

## 2021-11-16 PROCEDURE — 65270000029 HC RM PRIVATE

## 2021-11-16 PROCEDURE — 85027 COMPLETE CBC AUTOMATED: CPT

## 2021-11-16 PROCEDURE — 80069 RENAL FUNCTION PANEL: CPT

## 2021-11-16 RX ORDER — WARFARIN SODIUM 5 MG/1
5 TABLET ORAL ONCE
Status: COMPLETED | OUTPATIENT
Start: 2021-11-16 | End: 2021-11-16

## 2021-11-16 RX ADMIN — METOPROLOL SUCCINATE 12.5 MG: 25 TABLET, EXTENDED RELEASE ORAL at 08:09

## 2021-11-16 RX ADMIN — EPOETIN ALFA-EPBX 20000 UNITS: 20000 INJECTION, SOLUTION INTRAVENOUS; SUBCUTANEOUS at 20:29

## 2021-11-16 RX ADMIN — CEFTRIAXONE SODIUM 2 G: 2 INJECTION, POWDER, FOR SOLUTION INTRAMUSCULAR; INTRAVENOUS at 14:02

## 2021-11-16 RX ADMIN — ASPIRIN 81 MG: 81 TABLET, CHEWABLE ORAL at 08:08

## 2021-11-16 RX ADMIN — HYDROMORPHONE HYDROCHLORIDE 1 MG: 1 INJECTION, SOLUTION INTRAMUSCULAR; INTRAVENOUS; SUBCUTANEOUS at 18:16

## 2021-11-16 RX ADMIN — ATORVASTATIN CALCIUM 20 MG: 20 TABLET, FILM COATED ORAL at 08:08

## 2021-11-16 RX ADMIN — CALCIUM ACETATE 1334 MG: 667 CAPSULE ORAL at 17:28

## 2021-11-16 RX ADMIN — HYDROMORPHONE HYDROCHLORIDE 1 MG: 1 INJECTION, SOLUTION INTRAMUSCULAR; INTRAVENOUS; SUBCUTANEOUS at 14:02

## 2021-11-16 RX ADMIN — OXYCODONE 5 MG: 5 TABLET ORAL at 17:28

## 2021-11-16 RX ADMIN — CALCITRIOL CAPSULES 0.25 MCG 0.5 MCG: 0.25 CAPSULE ORAL at 08:08

## 2021-11-16 RX ADMIN — CALCIUM ACETATE 1334 MG: 667 CAPSULE ORAL at 12:00

## 2021-11-16 RX ADMIN — WARFARIN SODIUM 5 MG: 5 TABLET ORAL at 17:28

## 2021-11-16 RX ADMIN — Medication 10 ML: at 05:34

## 2021-11-16 RX ADMIN — HYDROMORPHONE HYDROCHLORIDE 1 MG: 1 INJECTION, SOLUTION INTRAMUSCULAR; INTRAVENOUS; SUBCUTANEOUS at 22:15

## 2021-11-16 RX ADMIN — HYDROMORPHONE HYDROCHLORIDE 1 MG: 1 INJECTION, SOLUTION INTRAMUSCULAR; INTRAVENOUS; SUBCUTANEOUS at 02:55

## 2021-11-16 RX ADMIN — VANCOMYCIN HYDROCHLORIDE 500 MG: 500 INJECTION, POWDER, LYOPHILIZED, FOR SOLUTION INTRAVENOUS at 15:56

## 2021-11-16 RX ADMIN — HYDROMORPHONE HYDROCHLORIDE 1 MG: 1 INJECTION, SOLUTION INTRAMUSCULAR; INTRAVENOUS; SUBCUTANEOUS at 08:03

## 2021-11-16 RX ADMIN — Medication 10 ML: at 20:30

## 2021-11-16 RX ADMIN — CALCIUM ACETATE 1334 MG: 667 CAPSULE ORAL at 08:08

## 2021-11-16 RX ADMIN — PANTOPRAZOLE SODIUM 40 MG: 40 TABLET, DELAYED RELEASE ORAL at 08:08

## 2021-11-16 RX ADMIN — Medication 10 ML: at 14:00

## 2021-11-16 NOTE — PROGRESS NOTES
Interdisciplinary Rounds were completed on 11/16/21 for this patient. Rounds included nursing, clinical care leader, pharmacy, and case management. Plan of care discussed. See clinical pathway and/or care plan for interventions and desired outcomes. Phone number for Dr. Juan Munguia, DMD, MS [phone].

## 2021-11-16 NOTE — PROGRESS NOTES
Pharmacist Daily Dosing of Warfarin    Indication & Goal INR: AFib, INR Goal 2-3   has pacemaker, Hx DVT and graft thromboses, Hx HIT    PTA Warfarin Dose: 2.5 mg daily    Notable concurrent conditions and medications:     Labs:  Recent Labs     11/16/21  0947 11/16/21  0302 11/15/21  0404 11/15/21  0404 11/14/21  0034 11/13/21  1730 11/13/21  1730   0000   INR  --  1.8*  --  1.6* 1.7*   < > 1.7*  --    HGB 8.7*  --    < > 7.8*  --   --   --   --      --   --  173  --   --   --   --    TBILI  --   --   --   --   --   --  0.3  --    ALB 2.6*  --   --   --   --   --  2.4*   < >    < > = values in this interval not displayed. Impression/Plan:   Warfarin 5 mg this afternoon. Daily INR ordered  CBC w/o differential every other day has been ordered     Pharmacy will follow daily and adjust the dose as appropriate.     Thank you,  Tita Marks, PHARMD

## 2021-11-16 NOTE — PROGRESS NOTES
Nephrology Progress Note  Blaise Leblanc     www. [facility]  Phone - [phone]   Patient: Rachelle Carlson    YOB: 1975        Date- 11/16/2021   Admit Date: 11/5/2021  CC: Follow up for esrd          IMPRESSION & PLAN:   · ESRD- home HD 5 days per week- Follows up with Dr Keyla Brothers at [facility]  · Sec. hyperpara  · Hyperkalemia  · Left arm swelling- s/p angioplasty of left subclavian vein  · Gram positive sepsis-Coag negative staph aureus bacteremia 1/4  · Streptococcus intermedius bacteremia 4/4 bottles  · s/p lead extraction at [facility]  · Anemia of ckd  · Hx of renal tx in past times 2.  · Hypertension  · CAD  · Failed RTX times 2         H/o DVT, s/p ivc filter/ h/o HIT     PLAN-   Seen on hd today   Remove 2 kg as tolerataed   Continue calcitriol   epogen for anemia   Continue phoslo   Will keep him on TTS schedule for now   He will go to inpatient hd unit if he needs abx after discharge. D/w patient and hd nurse     Subjective: Interval History:   -bp stable  No c/o sob,  No c/o chest pain,   No c/o nausea or vomiting, No c/o  fever. S/p left arm angiogram yesterday      Objective:   Vitals:    11/15/21 2000 11/15/21 2316 11/16/21 0306 11/16/21 0636   BP: 129/83 (!) 147/93 (!) 139/90    Pulse: 86 90 87    Resp: 21 22 21    Temp: 98.3 °F (36.8 °C) 97.9 °F (36.6 °C) 97.9 °F (36.6 °C)    TempSrc:       SpO2: 97% 100% 99%    Weight:    64.5 kg (142 lb 4.8 oz)   Height:          11/15 0701 - 11/16 0700  In: 190 [P.O.:120; I.V.:70]  Out: -   Last 3 Recorded Weights in this Encounter    11/11/21 1213 11/15/21 1355 11/16/21 0636   Weight: 60 kg (132 lb 4.4 oz) 60 kg (132 lb 4.4 oz) 64.5 kg (142 lb 4.8 oz)      Physical exam:   GEN:NAD  NECK- Supple, no mass  RESP: No wheezing,clear b/l  CVS: S1,S2  RRR  NEURO: Normal speech, non focal  EXT: No Edema   PSYCH: Normal Mood      Chart reviewed. Pertinent Notes reviewed.      Data Review :  Recent Labs 11/15/21  0404   *   K 4.9   CL 96*   CO2 28   BUN 25*   CREA 7.70*   GLU 89   CA 9.9     Recent Labs     11/15/21  0404   WBC 10.9   HGB 7.8*   HCT 25.4*        No results for input(s): FE, TIBC, PSAT, FERR in the last 72 hours.    Medication list  reviewed  Current Facility-Administered Medications   Medication Dose Route Frequency    epoetin emilie-epbx (RETACRIT) injection 20,000 Units  20,000 Units SubCUTAneous Q TUE, THU & SAT    iothalamate meglumine (CONRAY 30) 30 % contrast solution soln    PRN    lidocaine (PF) 10 mg/mL (1 %) 20 mL solution    CONTINUOUS    Vancomycin - Pharmacy to dose   Other Rx Dosing/Monitoring    cefTRIAXone (ROCEPHIN) 2 g in 0.9% sodium chloride (MBP/ADV) 50 mL MBP  2 g IntraVENous Q24H    0.9% sodium chloride infusion 250 mL  250 mL IntraVENous PRN    oxyCODONE IR (ROXICODONE) tablet 5 mg  5 mg Oral Q4H PRN    HYDROmorphone (DILAUDID) injection 1 mg  1 mg IntraVENous Q4H PRN    WARFARIN INFORMATION NOTE (COUMADIN)   Other QPM    aspirin chewable tablet 81 mg  81 mg Oral DAILY    calcitRIOL (ROCALTROL) capsule 0.5 mcg  0.5 mcg Oral DAILY    atorvastatin (LIPITOR) tablet 20 mg  20 mg Oral DAILY    calcium acetate(phosphat bind) (PHOSLO) capsule 1,334 mg  2 Capsule Oral TID WITH MEALS    metoprolol succinate (TOPROL-XL) XL tablet 12.5 mg  12.5 mg Oral DAILY    pantoprazole (PROTONIX) tablet 40 mg  40 mg Oral ACB    sodium chloride (NS) flush 5-40 mL  5-40 mL IntraVENous Q8H    sodium chloride (NS) flush 5-40 mL  5-40 mL IntraVENous PRN    acetaminophen (TYLENOL) tablet 650 mg  650 mg Oral Q6H PRN    Or    acetaminophen (TYLENOL) suppository 650 mg  650 mg Rectal Q6H PRN    polyethylene glycol (MIRALAX) packet 17 g  17 g Oral DAILY PRN    ondansetron (ZOFRAN ODT) tablet 4 mg  4 mg Oral Q8H PRN    Or    ondansetron (ZOFRAN) injection 4 mg  4 mg IntraVENous Q6H PRN          MD [facility]I [facility] Nephrology [address] / [facility] [address] [facility], [address] [place], [address]  Phone - [phone]               Fax - [phone]

## 2021-11-16 NOTE — PROGRESS NOTES
Vascular Surgery Progress Note  Marcelle Kern ACNP-BC  11/16/2021       Subjective:     Jessica Triplett a 46 y. o.  with a hx of ASHD, HTN, HLD, HFrEF, ESRF on home HD, multiple DVTs, HIT, anemia, small bowel necrosis, and GERD.  He is a well-known patient of the practice. He is routinely under the care of Dr. Kendy Reese. He is s/p creation of a RUE bovine AVG (now w/o flow) & LUE [place] AVG 9/2020.  He presented to [facility] in April of this year and was diagnosed with bacteremia after an extensive evaluation including EVER, CT scan, and tag WBC scan were unremarkable he underwent exploration of his LUE AVG on 4/9/2021. Surgical cultures were negative. He was transferred to [facility] for evaluation of his AICD & S-ICD. A EVER there revealed a mobile echodensity 12 x 6 mm attached to the endocardial lead. 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 He continues to have a subcutaneous functioning S-ICD in place that was placed prior to his infection in 2019. CXR this admission shows no evidence of residual AICD generator and confirms a left lateral chest wall S-ICD. While admitted at [facility] he was noted to have \"scattered ill-defined groundglass opacities within the right upper and lower lobes along with some scattered ill-defined nodular opacities. Findings may be due to sequela of a multifocal atypical infection. Recommend repeat CT chest in 3 months to ensure resolution was recommeded. \"  He was discharged on a prolonged course of IV antibxs. He has a hx of cardiac stents. He is s/p failed renal transplant x2  He has a hx of gallstone pancreatitis and peritonitis secondary to bowel necrosis. He has a hx of IVC filter placement that was removed in 2014. He is now admitted to the hospital with Streptococcus intermedius and CoNS bacteremia thought to be secondary to dental caries.   During admission he was noted to have left arm swelling and he is s/p fistulogram on 11/15/2021.     This am he reports improvement in his left arm swelling. He has a palpable left radial pulse. Nursing Data:     Patient Vitals for the past 24 hrs:   BP Temp Pulse Resp SpO2 Height Weight   11/16/21 0809 (!) 142/85 98.1 °F (36.7 °C) 87 20 95 %     11/16/21 0636       64.5 kg (142 lb 4.8 oz)   11/16/21 0306 (!) 139/90 97.9 °F (36.6 °C) 87 21 99 %     11/15/21 2316 (!) 147/93 97.9 °F (36.6 °C) 90 22 100 %     11/15/21 2000 129/83 98.3 °F (36.8 °C) 86 21 97 %     11/15/21 1645 139/88 97.7 °F (36.5 °C) 86 22 100 %     11/15/21 1630 108/75 97.8 °F (36.6 °C) 82 22 100 %     11/15/21 1615 116/74  92 29 100 %     11/15/21 1610   (!) 106 20 99 %     11/15/21 1605 102/63  91 23 100 %     11/15/21 1600 105/72  97 25 99 %     11/15/21 1557 124/77 97.6 °F (36.4 °C) (!) 104 26 99 %     11/15/21 1355 (!) 150/95 98.5 °F (36.9 °C) 87 16 100 % 5' 7\" (1.702 m) 60 kg (132 lb 4.4 oz)   11/15/21 1141 (!) 135/96 98.7 °F (37.1 °C) 85 14 96 %           Intake/Output Summary (Last 24 hours) at 11/16/2021 0955  Last data filed at 11/15/2021 1548  Gross per 24 hour   Intake 70 ml   Output    Net 70 ml       Exam:     Physical Exam  Constitutional:       Comments: Chronically ill-appearing  male   HENT:      Head:      Comments: Moon face     Nose: Nose normal.   Eyes:      Pupils: Pupils are equal, round, and reactive to light. Cardiovascular:      Rate and Rhythm: Normal rate and regular rhythm. Pulses:           Radial pulses are 2+ on the left side. Comments: Left upper extremity a with palpable thrill. No erythema noted. Nontender. Edema as expected. Pulmonary:      Effort: Pulmonary effort is normal. No respiratory distress. Abdominal:      General: Abdomen is flat. There is no distension. Musculoskeletal:         General: Normal range of motion. Cervical back: Normal range of motion.    Skin:     General: Skin is warm. Neurological:      General: No focal deficit present. Mental Status: Mental status is at baseline. Psychiatric:      Comments: Flat affect     Lab Review:     . Recent Results (from the past 24 hour(s))   PROTHROMBIN TIME + INR    Collection Time: 11/16/21  3:02 AM   Result Value Ref Range    INR 1.8 (H) 0.9 - 1.1      Prothrombin time 18.8 (H) 9.0 - 11.1 sec          Assessment/Plan:     Complication of hemodialysis   -s/p fistulogram 11/15  -edema improving per patient. · Plan to remove suture in a day or two. Sepsis-resolved  Thrombocytopenia-resolved  Streptococcus intermedius and CoNS bacteremia/dental caries   -History of MRSE epidermis lead endocarditis (4/2021). Status post removal of nonfunctioning AICD (4/22/2021). Residual functioning subcutaneous ICD (placed 2019). -History of multilobular groundglass opacities of the lungs (4/2021)  -Multiple dental caries   -History of gallstone pancreatitis  -History of bowel necrosis with peritonitis  · No evidence of acute infection of the left upper extremity aVG. No role for procedural investigation. Previous cultures have been no growth. No clinical evidence of infection. White blood cell tag scan is unremarkable. · Consider CT of the chest as he has previously documented history of possible atypical pneumonia with follow-up CT in 3 months recommended. Defer to ID.   · Antibxs per ID.     ESRF  -Nephrotic syndrome  -S/p failed renal transplant x2 (1992 & 2001)   -Home HD 5 days per week  Hyponatremia    Anemia of chronic disease   -stable  · Plan per nephrology     Hypertension  Hyperlipidemia  Coronary artery disease  -stenting 2007  -[facility] 7/22/20 w/ patent stent and no significant disease  HFrEF-41%  History of ventricular tachycardia      Seizure disorder      VTE Prophylaxis:  History of recurrent left lower extremity DVT  History of left brachial vein DVT  History of right IJ thrombosis  History of medical noncompliance with anticoagulation clinic  History of HIT  -Warfarin: Therapeutic on arrival.  Now subtherapeutic.       Disposition:  Home     Addendum:  Suture has been removed by HD nurse. Vascular surgery signing off. We appreciate the opportunity to participate in the care of Mr. Reggie Dean. Follow up with Dr. Vitaly Mejia as needed.

## 2021-11-16 NOTE — PROGRESS NOTES
HD TRANSFER - OUT REPORT:    Verbal report given to Nargis Slater RN on Giselle Locke being transferred to [facility] for routine progression of care       Report consisted of patient's Situation, Background, Assessment and   Recommendations(SBAR). Information from the following report(s) SBAR, Procedure Summary, Intake/Output, MAR and Recent Results was reviewed with the receiving nurse. Method:  $$ Method: Hemodialysis (11/16/21 7552)    Fluid Removed  NET Fluid Removed (mL): 2000 ml (11/16/21 1313)     Patient response to treatment:  Stable    End Time  Hemodialysis End Time: 0371 (11/16/21 1313)  If not documented, dialysis nurse to update post-dialysis row in HD/Filtration flowsheet     Medications /Volume expansion agents or Fluid boluses administered during treatment? no    Post-dialysis medication administration due?  yes  Remind nurse to administer post-HD medication upon return to unit. Fistula hemostasis? yes    Line heparinization? no    Lines: Alessandro AVG    Opportunity for questions and clarification was provided.       Patient transported with: [employer]

## 2021-11-16 NOTE — PROGRESS NOTES
Infectious Disease Progress Note    IMPRESSION:       -Sepsis    -Streptococcus intermedius & CoNS bacteremia  Blood cultures- + for CoNS , Strep intermedius4/  Negative cultures-, negative EVER. ICD present, currently no suspicion for infection  US chest - no fluid collection.    -Dental caries, probable source of strep intermedius  CT maxillofacial - Multiple teeth demonstrate periapical lucency, any of which could be  infectious-periapical abscess, most prominent involving the 2 posterior-most  right mandibular molars. Difficult to exclude periapical dental abscess. -ESRD on dialysis  On LUE Dialysis. No concern for infection per Vascular    -LUE swelling due to central venous stenosis  S/p angiogram L/arm dialysis graft  S/p angioplasty L/subclavian vein    -H/o recurrent bacteremia CoNS, MRSE/ MSSE  Bacteremia with CoNS- 21, 3/30  MRSE-3/27/21, 3/29/21, 10/2020, MSSE-              PLAN:            - Continue Ceftriaxone 2gm IV daily/ add Vancomycin IV to cover CoNS. May DC Ceftriaxone once Vancomycin therapeutic doses are achieved. Vancomycin ZECHARIAH for Strep Intermedius is 0.5 ( .0125- 1.0 is sensitive). Target Vancomycin trough 20-25  -Plan for at least 4 weeks of therapy .  -Pt requires dental care, D/w pt                     Subjective:     Patient seen. Denies new complaints  Afebrile. S/p angiogram/angioplasty of left arm dialysis graft, subclavian vein. Review of Systems:  A comprehensive review of systems was negative except for that written in the History of Present Illness. 14 point ROS obtained . All other systems negative . Objective:     Blood pressure 129/83, pulse 86, temperature 98.3 °F (36.8 °C), resp. rate 21, height 5' 7\" (1.702 m), weight 132 lb 4.4 oz (60 kg), SpO2 97 %.   Temp (24hrs), Av.2 °F (36.8 °C), Min:97.6 °F (36.4 °C), Max:98.7 °F (37.1 °C)      Patient Vitals for the past 24 hrs:   Temp Pulse Resp BP SpO2   11/15/21 2000 98.3 °F (36.8 °C) 86 21 129/83 97 %   11/15/21 1645 97.7 °F (36.5 °C) 86 22 139/88 100 %   11/15/21 1630 97.8 °F (36.6 °C) 82 22 108/75 100 %   11/15/21 1615  92 29 116/74 100 %   11/15/21 1610  (!) 106 20  99 %   11/15/21 1605  91 23 102/63 100 %   11/15/21 1600  97 25 105/72 99 %   11/15/21 1557 97.6 °F (36.4 °C) (!) 104 26 124/77 99 %   11/15/21 1355 98.5 °F (36.9 °C) 87 16 (!) 150/95 100 %   11/15/21 1141 98.7 °F (37.1 °C) 85 14 (!) 135/96 96 %   11/15/21 0735 98.1 °F (36.7 °C) 88 14 120/74 99 %   11/15/21 0250 98.6 °F (37 °C) 94 16 132/76 100 %         Lines:  Peripheral IV:       Physical Exam:   General:  Awake, cooperative, facial swelling +   Eyes:  Sclera anicteric. Pupils equally round and reactive to light. Mouth/Throat: Mucous membranes normal, oral pharynx clear   Neck: Supple   Lungs:   Clear to auscultation bilaterally, good effort   CV:  Regular rate and rhythm,no murmur, click, rub or gallop   Abdomen:   Soft, non-tender.  bowel sounds normal. non-distended   Extremities: No cyanosis or edema   Skin: Skin color, texture, turgor normal. no acute rash or lesions   Lymph nodes: Cervical and supraclavicular normal   Musculoskeletal: No swelling or deformity   Lines/Devices:  Intact, no erythema, drainage or tenderness   Psych: Awake and oriented, normal mood affect       Data Reviewied   CBC:   Recent Labs     11/15/21  0404   WBC 10.9   RBC 2.61*   HGB 7.8*   HCT 25.4*      GRANS 81*   LYMPH 13   EOS 0     CMP:   Recent Labs     11/15/21  0404 11/13/21  1730   GLU 89  --    *  --    K 4.9  --    CL 96*  --    CO2 28  --    BUN 25*  --    CREA 7.70*  --    CA 9.9  --    AGAP 8  --    BUCR 3*  --    AP  --  118*   TP  --  7.5   ALB  --  2.4*   GLOB  --  5.1*   AGRAT  --  0.5*       Studies reviewed:      Lab Results   Component Value Date/Time    Culture result: NO GROWTH 5 DAYS 11/07/2021 09:50 AM    Culture result: (A) 11/05/2021 10:59 PM     STAPHYLOCOCCUS SPECIES, COAGULASE NEGATIVE GROWING IN 1 OF 4 BOTTLES DRAWN (R BASILLI SITE)    Culture result: (A) 11/05/2021 10:59 PM     PRELIMINARY REPORT OF GRAM POSITIVE COCCI IN CLUSTERS GROWING IN 1 OF 4 BOTTLES DRAWN CALLED TO AND READ BACK BY MS PACO RN ON 11/6/21 AT [address] [facility] 0578). MS    Culture result: (A) 11/05/2021 10:59 PM     Streptococcus intermedius GROWING IN ALL 4 BOTTLES DRAWN SITES = R AND L [facility]    Culture result:  04/09/2021 03:10 AM     1 OF 2 BOTTLES FLAGGED POSITIVE BY INSTRUMENTATION. NO GROWTH 5 DAYS    Culture result: REMAINING BOTTLE(S) HAS/HAVE NO GROWTH IN 5 DAYS 04/09/2021 03:10 AM        XR Results (most recent):  Results from [place] encounter on 11/05/21    XR CHEST PORT    Narrative  EXAM: XR CHEST PORT    INDICATION: fever    COMPARISON: 3/29/2021    FINDINGS: A portable AP radiograph of the chest was obtained at 0414 hours. The  patient is on a cardiac monitor. Right axillary stent is noted. One electrode is  left in place, overlying the spine. Mild cardiomegaly is redemonstrated with  bilateral hilar prominence. There is no focal airspace process. Impression  No focal airspace process.  Mild cardiomegaly with probable pulmonary  arterial enlargement      Patient Active Problem List   Diagnosis Code    Kidney transplant     Coronary atherosclerosis of native coronary artery I25.10    Nausea & vomiting R11.2    Serum total bilirubin elevated R17    Abdominal pain R10.9    HTN (hypertension) I10    Anemia D64.9    S/P appendectomy Z90.49    High cholesterol E78.00    Other ill-defined conditions(799.89) R69    CAD (coronary artery disease) I25.10    Gastrointestinal disorder K92.9    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HCC) D69.6    Peritonitis ([address] K65.9    Malnutrition ([address] E46    DVT (deep venous thrombosis) (HCC) I82.409    S/P IVC filter Z95.828    Arterial occlusion I70.90    S/p cadaver renal transplant Z94.0    AICD (automatic cardioverter/defibrillator) present Z95.810    ESRD (end stage renal disease) on dialysis ([facility] N18.6, Z99.2    Dialysis patient ([facility] Z99.2    Congestive heart failure, unspecified I50.9    Hypoglycemia E16.2    Sepsis ([facility]) A41.9    Pneumonia J18.9    Abnormal EKG R94.31    ESRD (end stage renal disease) ([facility]) N18.6    Leucocytosis D72.829    Gram-positive bacteremia R78.81    Fever R50.9         ICD-10-CM ICD-9-CM    1. Intractable nausea and vomiting  R11.2 536.2    2. ESRD (end stage renal disease) on dialysis ([facility]  N18.6 585.6     Z99.2 V45.11        I have discussed the diagnosis with the patient and the intended plan as seen in the above orders. I have discussed medication side effects and warnings with the patient as well.     Reviewed test results at length with patient    Anti-infectives:     Current Regimen of Each Antimicrobial:  Vancomycin 500 mg after HD - restarted 11/15  Ceftriaxone 2 gm IV q24h - started 11/10     Previous Antimicrobial Therapy:  Zosyn 3.275gm IV q12h HD regimen - started 11/5 day 6  Vancomycin HD dosing - started 11/5-11/11 (dose last given on 11/7)     Zaynab Ace MD FACP

## 2021-11-16 NOTE — PROGRESS NOTES
TRANSFER - IN REPORT:    Verbal report received from [facility], [facility] on [facility]  being received from [facility] for ordered procedure      Report consisted of patients Situation, Background, Assessment and   Recommendations(SBAR). Information from the following report(s) SBAR, Kardex and Cardiac Rhythm NSR\ was reviewed with the receiving nurse. Opportunity for questions and clarification was provided. Assessment completed upon patients arrival to unit and care assumed.

## 2021-11-16 NOTE — PROGRESS NOTES
End of Shift Note    Bedside shift change report given to [place] (oncoming nurse) by Kinsey Benites (offgoing nurse). Report included the following information SBAR, Kardex, Intake/Output, MAR and Recent Results    Shift worked:  [id number]     Shift summary and any significant changes:     No significant changes. Pt requested PRN dilaudid x1. No bleeding/concerns from procedure site.      Concerns for physician to address:  none     Zone phone for oncoming shift:          Kinsey Benites

## 2021-11-16 NOTE — PROCEDURES
Hemodialysis / [phone]    Vitals Pre Post Assessment Pre Post   BP BP: (!) 149/90 (11/16/21 0942) 105/63 LOC A&Ox4 A&Ox4   HR Pulse (Heart Rate): 83 (11/16/21 0942) 72 Lungs Dim bases; coarse upper clear   Resp Resp Rate: 18 (11/16/21 0942) 18 Cardiac Tele, NSR Tele, NSR   Temp Temp: 98 °F (36.7 °C) (11/16/21 0942) 97.7, oral Skin CDI CDI   Weight  n/a n/a Edema Trace BLE none   Tele status remote remote Pain 0 0     Orders   Duration: Start: 9366 End: 5447 Total: 3.5hr   Dialyzer: Dialyzer/Set Up Inspection: Anam Hoang (11/16/21 0942)   K Bath: Dialysate K (mEq/L): 2 (11/16/21 0942)   Ca Bath: Dialysate CA (mEq/L): 2.5 (11/16/21 0942)   Na: Dialysate NA (mEq/L): 138 (11/16/21 0942)   Bicarb: Dialysate HCO3 (mEq/L): 35 (11/16/21 0942)   Target Fluid Removal: Goal/Amount of Fluid to Remove (mL): 2000 mL (11/16/21 0942)     Access   Type & Location: SUZANNE AVG   Comments:   skin CDI with suture over venous portion from access intervention yesterday. No s/s of infection. + B/T. No issues with cannulation or hemostasis. Running at -375.       Labs   HBsAg (Antigen) / date: Neg Ag 11/11/2021                                        HBsAb (Antibody) / date: Immune 11/11/2021   Source: EPIC   Obtained/Reviewed  Critical Results Called HGB   Date Value Ref Range Status   11/15/2021 7.8 (L) 12.1 - 17.0 g/dL Final     Potassium   Date Value Ref Range Status   11/15/2021 4.9 3.5 - 5.1 mmol/L Final     Calcium   Date Value Ref Range Status   11/15/2021 9.9 8.5 - 10.1 MG/DL Final     BUN   Date Value Ref Range Status   11/15/2021 25 (H) 6 - 20 MG/DL Final     Creatinine   Date Value Ref Range Status   11/15/2021 7.70 (H) 0.70 - 1.30 MG/DL Final        Meds Given   Name Dose Route   None ordered                 Adequacy / Fluid    Total Liters Process: 72.4L   Net Fluid Removed: 2000ml      Comments   Time Out Done:   (Time) 5340   Admitting Diagnosis: Fever/ Bacteremia   Consent obtained/signed: Informed Consent Verified: Yes (11/16/21 3733)   Machine / RO # Machine Number: [id number] (11/16/21 8545)   Primary Nurse Rpt Pre: Mortimer Maus, RN   Primary Nurse Rpt Post: Mortimer Maus, RN   Pt Education: Site rotation   Care Plan: ongoing   Pts outpatient clinic: [facility] home hemo     Tx Summary   Comments:    Pt arrived to HD suite A&Ox4. Consent signed & on file. SBAR received from Primary RN.  8840: Pt cannulated with 94V needles per policy & without issue. Labs drawn per request/ order. VSS. Dialysis Tx initiated. * no issues with treatment*  1315: Tx ended. VSS. All possible blood returned to patient. Hemostasis achieved without issue. Bed locked and in the lowest position, call bell and belongings in reach. SBAR given to Primary, RN. Patient is stable at time of their departure. All Dialysis related medications have been reviewed.

## 2021-11-16 NOTE — PROGRESS NOTES
Bedside and Verbal shift change report given to Floretta Hammans (oncoming nurse) by Morris Monk on behalf of Magdy(offgoing nurse). Report included the following information SBAR, Kardex, Intake/Output, MAR and Recent Results.

## 2021-11-16 NOTE — PROGRESS NOTES
Pharmacy Antimicrobial Kinetic Dosing    Indication for Antimicrobials: Bacteremia   PMH:  Bacteremia 2021, ICD removal    Current Regimen of Each Antimicrobial:  Vancomycin 500 mg after HD - restarted 11/15  Ceftriaxone 2 gm IV q24h - started 11/10    Previous Antimicrobial Therapy:  Zosyn 3.275gm IV q12h HD regimen - started  day 6  Vancomycin HD dosing - started - (dose last given on )    Vancomycin Goal Level:  20     Date Dose & Interval Measured (mcg/mL) Predicted AUC/ZECHARIAH   21 05:40 750 after HD 36.6                  Significant Positive Cultures:    Blood - streptococcus intermedius -  - Final   Blood - NG - Prelim    Conditions for Dosing Consideration:     Labs:  Recent Labs     11/15/21  0404   CREA 7.70*   BUN 25*     Recent Labs     11/15/21  0404   WBC 10.9     Temp (24hrs), Av.3 °F (36.8 °C), Min:97.6 °F (36.4 °C), Max:98.9 °F (37.2 °C)    Creatinine Clearance (mL/min):   HD     Impression/Plan:   Pharmacy consulted for vancomycin dosing for CoNS, Strep intermedius. Will order a loading dose of 1500 mg IV once, then 500 mg after HD. Continue ceftriaxone for strep intermedius   Antimicrobial stop date pending     Pharmacy will follow daily and adjust medications as appropriate for renal function and/or serum levels.     Thank you,  Shelley Villarreal, PHARMD

## 2021-11-16 NOTE — PROGRESS NOTES
Transition of Care Plan:     RUR:16%  Disposition:Home  Follow up appointments:PCP, oral surgeon-Dr Sis Mayer, [phone] will schedule prior to d/c  DME needed:none  Transportation at [facility] or means to access home:        IM Medicare Letter:2nd IM needed  Is patient a BCPI-A Bundle: If yes, was Bundle Letter given?:     Caregiver Contact:Lico jackson [phone]  Discharge Caregiver contacted prior to discharge? Patient expected to d/c w/IV antibiotics. CM will send referral when order is available. CM will continue to follow.     Alvina Franz  Ext [phone]

## 2021-11-16 NOTE — PROGRESS NOTES
Hospitalist Progress Note    NAME: Radha Mckeon   :  1975   MRN:  [id number]   Room Number:  DIAL/PL  @ [facility]            Assessment / Plan:       Estimated discharge date:   Barriers: Clinical improvement,dental surgery evaluation, treatment of bacteremia         Coag negative staph aureus bacteremia   Streptococcus intermedius bacteremia / bottles  Sepsis  History of AV graft  -Presented with generalized body ache, nausea, vomiting. Sepsis indicatorstachycardia, tachypnea, fever. CT abdomen/pelvisno acute abnormality.  -Chest x-rayNo focal airspace process. Mild cardiomegaly with probable pulmonary  arterial enlargement. Has a similar presentation in 2021 when the patient has persistent bacteremia and got ICD removed. TTE showed mitral valve thickening. tagged WBC Scan did not show abnormal uptake. US chest did not show localized fluid collection. Duplex of HD AVG did not show evidence of infection. CT Maxillofacial   revealed possible periapical dental abscesses in multiple teeth. EVER did not show vegetations.    -cont' IV rocephin and vanco.  Final abx choice/duration as per ID. Once final recs are made, he will need IV access for home IV abx. ID requested therapeutic dose of vancomycin to discontinue ceftriaxone  -repeat Bcx  NTD  - ID, vascular surgery evaluated, vascular surgery felt AVG is not infected, s/p fistulogram 11/15/2021  - ID recommended evaluation from dental surgeon. I discussed with oral surgeon yesterday and he is able to accommodate appointment immediately after discharge to his clinic however because we are waiting for vancomycin level to be therapeutic patient will not be able to make it today. All surgeons office need to be called when we know exactly when the patient will be discharged. End-stage renal disease on dialysis POA  Follows up with Dr Pratik Meyers at [facility]     Nephrology following on TTS schedule. Continue with the PhosLo and calcitriol. Acute on chronic anemia  -S/p 1 unit of PRBC. Repeat cbc in the AM     History of DVT with graft thrombosis  -Continue Coumadin. Pharmacy managing warfarin. INR is 1.7. Hypertension  GERD  CAD  History of DVT and graft thromboses on Coumadin  History of HIT  ICMP s/p ICP placement  Continue metoprolol, PPI, aspirin, Lipitor  Pharmacy managing Coumadin dosing  -daily INR     18.5 - 24.9 Normal weight / Body mass index is 22.37 kg/m². Code Status: Full code  Surrogate Decision Maker:   DVT Prophylaxis: Coumadin  GI Prophylaxis: not indicated   Baseline: Ambulatory at home              Subjective:     Patient was seen and examined. No acute events overnight. Discussed with RN overnight events. All patient's questions were answered. \"doing well\"    Review of Systems:  Symptom Y/N Comments  Symptom Y/N Comments   Fever/Chills n   Chest Pain n    Poor Appetite    Edema     Cough n   Abdominal Pain n    Sputum    Joint Pain     SOB/ZAMUDIO n   Pruritis/Rash     Nausea/vomit n   Tolerating PT/OT     Diarrhea    Tolerating Diet y    Constipation    Other       Could NOT obtain due to:            Objective:     VITALS:   Last 24hrs VS reviewed since prior progress note.  Most recent are:  Patient Vitals for the past 24 hrs:   Temp Pulse Resp BP SpO2   11/16/21 1300  72 18 100/61    11/16/21 1245  70 18 109/64    11/16/21 1230  69 18 113/64    11/16/21 1215  70 18 104/66    11/16/21 1200  73 18 111/69    11/16/21 1145  86 18 130/86    11/16/21 1130  71 18 100/64    11/16/21 1115  68 18 97/63    11/16/21 1100  73 18 111/71    11/16/21 1045  77 18 113/69    11/16/21 1030  76 18 105/71    11/16/21 1015  75 18 107/68    11/16/21 1000  80 18 123/82    11/16/21 0942 98 °F (36.7 °C) 83 18 (!) 149/90    11/16/21 0809 98.1 °F (36.7 °C) 87 20 (!) 142/85 95 %   11/16/21 0306 97.9 °F (36.6 °C) 87 21 (!) 139/90 99 %   11/15/21 2316 97.9 °F (36.6 °C) 90 22 (!) 147/93 100 %   11/15/21 2000 98.3 °F (36.8 °C) 86 21 129/83 97 %   11/15/21 1645 97.7 °F (36.5 °C) 86 22 139/88 100 %   11/15/21 1630 97.8 °F (36.6 °C) 82 22 108/75 100 %   11/15/21 1615  92 29 116/74 100 %   11/15/21 1610  (!) 106 20  99 %   11/15/21 1605  91 23 102/63 100 %   11/15/21 1600  97 25 105/72 99 %   11/15/21 1557 97.6 °F (36.4 °C) (!) 104 26 124/77 99 %   11/15/21 1355 98.5 °F (36.9 °C) 87 16 (!) 150/95 100 %       Intake/Output Summary (Last 24 hours) at 11/16/2021 1310  Last data filed at 11/15/2021 1548  Gross per 24 hour   Intake 70 ml   Output    Net 70 ml        PHYSICAL EXAM:  General: WD, WN. Alert, cooperative, no acute distress    EENT:  EOMI. Anicteric sclerae. MMM  Resp:  CTA bilaterally, no wheezing or rales. No accessory muscle use  CV:  Regular  rhythm,  normal S1/S2, no murmurs rubs gallops, No edema  GI:  Soft, Non distended, Non tender. +Bowel sounds  Neurologic:  Alert and oriented X 3, normal speech,   Psych:   Good insight. Not anxious nor agitated  Skin:  No rashes. No jaundice    Reviewed most current lab test results and cultures  YES  Reviewed most current radiology test results   YES  Review and summation of old records today    NO  Reviewed patient's current orders and MAR    YES  PMH/SH reviewed - no change compared to H&P  ________________________________________________________________________  Care Plan discussed with:    Comments   Patient x    Family      RN x    Care Manager x    Consultant                       x Multidiciplinary team rounds were held today with , nursing, pharmacist and clinical coordinator. Patient's plan of care was discussed; medications were reviewed and discharge planning was addressed.      ________________________________________________________________________  Total NON critical care TIME: 35  Minutes    Total CRITICAL CARE TIME Spent:   Minutes non procedure based      Comments   >50% of visit spent in counseling and coordination of care x    ________________________________________________________________________  Brant Pelaez MD     Procedures: see electronic medical records for all procedures/Xrays and details which were not copied into this note but were reviewed prior to creation of Plan. LABS:  I reviewed today's most current labs and imaging studies. Pertinent labs include:  Recent Labs     11/16/21  0947 11/15/21  0404   WBC 16.6* 10.9   HGB 8.7* 7.8*   HCT 27.9* 25.4*    173     Recent Labs     11/16/21  0947 11/16/21  0302 11/15/21  0404 11/14/21  0034 11/13/21  1730 11/13/21  1730   *  --  132*  --   --   --    K 5.5*  --  4.9  --   --   --    CL 98  --  96*  --   --   --    CO2 27  --  28  --   --   --    *  --  89  --   --   --    BUN 49*  --  25*  --   --   --    CREA 10.00*  --  7.70*  --   --   --    CA 9.5  --  9.9  --   --   --    PHOS 4.3  --   --   --   --   --    ALB 2.6*  --   --   --   --  2.4*   TBILI  --   --   --   --   --  0.3   ALT  --   --   --   --   --  7*   INR  --  1.8* 1.6* 1.7*   < > 1.7*    < > = values in this interval not displayed.        Signed: Brant Pelaez MD

## 2021-11-17 ENCOUNTER — TELEPHONE (OUTPATIENT)
Dept: INFECTIOUS DISEASES | Age: 46
End: 2021-11-17

## 2021-11-17 LAB
INR PPP: 2 (ref 0.9–1.1)
PROTHROMBIN TIME: 20.7 SEC (ref 9–11.1)
VANCOMYCIN SERPL-MCNC: 24.6 UG/ML

## 2021-11-17 PROCEDURE — 99233 SBSQ HOSP IP/OBS HIGH 50: CPT | Performed by: STUDENT IN AN ORGANIZED HEALTH CARE EDUCATION/TRAINING PROGRAM

## 2021-11-17 PROCEDURE — 74011250636 HC RX REV CODE- 250/636: Performed by: SURGERY

## 2021-11-17 PROCEDURE — 36415 COLL VENOUS BLD VENIPUNCTURE: CPT

## 2021-11-17 PROCEDURE — 74011250637 HC RX REV CODE- 250/637: Performed by: INTERNAL MEDICINE

## 2021-11-17 PROCEDURE — 80202 ASSAY OF VANCOMYCIN: CPT

## 2021-11-17 PROCEDURE — 65270000029 HC RM PRIVATE

## 2021-11-17 PROCEDURE — 85610 PROTHROMBIN TIME: CPT

## 2021-11-17 PROCEDURE — 74011250637 HC RX REV CODE- 250/637: Performed by: SURGERY

## 2021-11-17 RX ORDER — WARFARIN 1 MG/1
2.5 TABLET ORAL ONCE
Status: COMPLETED | OUTPATIENT
Start: 2021-11-17 | End: 2021-11-17

## 2021-11-17 RX ADMIN — HYDROMORPHONE HYDROCHLORIDE 1 MG: 1 INJECTION, SOLUTION INTRAMUSCULAR; INTRAVENOUS; SUBCUTANEOUS at 21:18

## 2021-11-17 RX ADMIN — Medication 10 ML: at 13:12

## 2021-11-17 RX ADMIN — WARFARIN SODIUM 2.5 MG: 1 TABLET ORAL at 17:31

## 2021-11-17 RX ADMIN — HYDROMORPHONE HYDROCHLORIDE 1 MG: 1 INJECTION, SOLUTION INTRAMUSCULAR; INTRAVENOUS; SUBCUTANEOUS at 13:11

## 2021-11-17 RX ADMIN — PANTOPRAZOLE SODIUM 40 MG: 40 TABLET, DELAYED RELEASE ORAL at 07:42

## 2021-11-17 RX ADMIN — HYDROMORPHONE HYDROCHLORIDE 1 MG: 1 INJECTION, SOLUTION INTRAMUSCULAR; INTRAVENOUS; SUBCUTANEOUS at 17:36

## 2021-11-17 RX ADMIN — ASPIRIN 81 MG: 81 TABLET, CHEWABLE ORAL at 08:55

## 2021-11-17 RX ADMIN — METOPROLOL SUCCINATE 12.5 MG: 25 TABLET, EXTENDED RELEASE ORAL at 08:55

## 2021-11-17 RX ADMIN — CALCIUM ACETATE 1334 MG: 667 CAPSULE ORAL at 17:14

## 2021-11-17 RX ADMIN — HYDROMORPHONE HYDROCHLORIDE 1 MG: 1 INJECTION, SOLUTION INTRAMUSCULAR; INTRAVENOUS; SUBCUTANEOUS at 08:54

## 2021-11-17 RX ADMIN — CALCIUM ACETATE 1334 MG: 667 CAPSULE ORAL at 11:33

## 2021-11-17 RX ADMIN — Medication 5 ML: at 20:51

## 2021-11-17 RX ADMIN — CALCITRIOL CAPSULES 0.25 MCG 0.5 MCG: 0.25 CAPSULE ORAL at 08:55

## 2021-11-17 RX ADMIN — ATORVASTATIN CALCIUM 20 MG: 20 TABLET, FILM COATED ORAL at 08:55

## 2021-11-17 RX ADMIN — CALCIUM ACETATE 1334 MG: 667 CAPSULE ORAL at 07:42

## 2021-11-17 RX ADMIN — HYDROMORPHONE HYDROCHLORIDE 1 MG: 1 INJECTION, SOLUTION INTRAMUSCULAR; INTRAVENOUS; SUBCUTANEOUS at 03:43

## 2021-11-17 RX ADMIN — Medication 5 ML: at 21:18

## 2021-11-17 RX ADMIN — Medication 10 ML: at 06:51

## 2021-11-17 NOTE — PROGRESS NOTES
Infectious Disease Progress Note         Interval:  Afebrile ON      Subjective:   Seen this morning in follow-up. Reports feeling well. Abdominal pain in the LUQ is improving. Good appetite. Denies fevers or chills at night. Objective:    Vitals:   Reviewed in chart. Physical Exam:  ? GEN: NAD  ? HEENT: Normocephalic, atraumatic, PERRL, no scleral icterus, multiple teeth missing. ? CV: Hemodynamically stable  ? Lungs: Room air  ? Abdomen: soft  ? Genitourinary:  no oneal  ? Extremities: no edema  ? Neuro: Alert, oriented to time, place and situation, moves all extremities to commands, verbal   ? Skin: no rash  ?  Psych: good affect, good eye contact, non tearful             Labs:  Recent Results (from the past 24 hour(s))   RENAL FUNCTION PANEL    Collection Time: 11/16/21  9:47 AM   Result Value Ref Range    Sodium 135 (L) 136 - 145 mmol/L    Potassium 5.5 (H) 3.5 - 5.1 mmol/L    Chloride 98 97 - 108 mmol/L    CO2 27 21 - 32 mmol/L    Anion gap 10 5 - 15 mmol/L    Glucose 168 (H) 65 - 100 mg/dL    BUN 49 (H) 6 - 20 MG/DL    Creatinine 10.00 (H) 0.70 - 1.30 MG/DL    BUN/Creatinine ratio 5 (L) 12 - 20      GFR est AA 7 (L) >60 ml/min/1.73m2    GFR est non-AA 6 (L) >60 ml/min/1.73m2    Calcium 9.5 8.5 - 10.1 MG/DL    Phosphorus 4.3 2.6 - 4.7 MG/DL    Albumin 2.6 (L) 3.5 - 5.0 g/dL   CBC W/O DIFF    Collection Time: 11/16/21  9:47 AM   Result Value Ref Range    WBC 16.6 (H) 4.1 - 11.1 K/uL    RBC 2.93 (L) 4.10 - 5.70 M/uL    HGB 8.7 (L) 12.1 - 17.0 g/dL    HCT 27.9 (L) 36.6 - 50.3 %    MCV 95.2 80.0 - 99.0 FL    MCH 29.7 26.0 - 34.0 PG    MCHC 31.2 30.0 - 36.5 g/dL    RDW 17.7 (H) 11.5 - 14.5 %    PLATELET 862 358 - 199 K/uL    MPV 11.7 8.9 - 12.9 FL    NRBC 0.0 0  WBC    ABSOLUTE NRBC 0.00 0.00 - 0.01 K/uL   PROTHROMBIN TIME + INR    Collection Time: 11/17/21  5:55 AM   Result Value Ref Range    INR 2.0 (H) 0.9 - 1.1      Prothrombin time 20.7 (H) 9.0 - 11.1 sec   VANCOMYCIN, RANDOM    Collection Time: 11/17/21  5:55 AM   Result Value Ref Range    Vancomycin, random 24.6 UG/ML             Assessment:  42y.o. 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  Patient has the nonendovascular ICD in his left chest from before. This was not removed at [facility] because the suspicion for involvement of this ICD from the infection was low. Also has hx of H/o DVT, s/p ivc filter/ h/o HIT.     This admission:  - Sepsis from Streptococcus intermedius bacteremia (in 3/4 bottles from 11/5/21, Pen G ZECHARIAH <0.06ug/ml) . Symptoms ongoing for ~6 days PTA. EVER done 11/11/21. No vegetations. CT maxillofacial showed multiple periapical abscess - difficult to exclude. Source of the Streptococcus intermedius bacteremia is from the teeth. Patient will need dental care evaluation right after discharge. The hospitalist team has communicated with Dr. Maite Garcia. Blood cultures from 11/7/2021. - Left chest ICD is non-endovascular. Hence, not at high risk for infectious involvement from bloodstream infections. Left chest US no fluid collections at the ICD. No focal suspicion seen for infectious involvement currently. -Patient also had coagulase-negative staph in 1/4 bottles from 11/5/2021. This is likely a contaminant in the current clinical context, however given patient's past history with high-grade MRSE bacteremia, we have decided to treat with vancomycin.  Vancomycin can be given with HD.  - On 11/15/2021 underwent angiogram of left arm dialysis graft and angioplasty of left subclavian vein for left arm swelling due to central venous stenosis, by vascular surgery. No concern for infection at this time.       Recommendations:  -Vancomycin 500 mg qHD, for goal trough 15-20. Vancomycin troughs must be obtained as needed to maintain trough levels of 15-20. Dose must be adjusted if supratherapeutic or subtherapeutic trough seen. -Duration 4 weeks, 11/7/2021 to 12/5/21.   - CT maxillofacial showed multiple periapical abscess - difficult to exclude. Source of the Streptococcus intermedius bacteremia is from the teeth. Patient will need dental care evaluation right after discharge. The hospitalist team has communicated with Dr. Leighton Turpin. -ID clinic follow-up in 2 months. · Please have labs done on weekly basis for CBC with diff, BMP/CMP, ESR, CRP  and have results sent to me by faxing to  [phone]. · Call with critical labs at [phone]. · If patient is on Vancomycin IV, please check Vancomycin trough send results to me by faxing to [phone]. For IV Vancomycin, trough goal is 15-20. Thank you for the opportunity to participate in the care of this patient. Please contact with questions or concerns.       Bunny Sharma MD  Infectious Diseases

## 2021-11-17 NOTE — TELEPHONE ENCOUNTER
Appointment scheduled for 1/19/2022 at 1:30pm for [facility] hospital follow-up.     Ander Cameron LPN

## 2021-11-17 NOTE — PROGRESS NOTES
Problem: Falls - Risk of  Goal: *Absence of Falls  Description: Document Santos Clara Fall Risk and appropriate interventions in the flowsheet.   Outcome: Progressing Towards Goal  Note: Fall Risk Interventions:  Mobility Interventions: Patient to call before getting OOB         Medication Interventions: Patient to call before getting OOB, Teach patient to arise slowly    Elimination Interventions: Call light in reach, Patient to call for help with toileting needs              Problem: Patient Education: Go to Patient Education Activity  Goal: Patient/Family Education  Outcome: Progressing Towards Goal     Problem: Patient Education: Go to Patient Education Activity  Goal: Patient/Family Education  Outcome: Progressing Towards Goal

## 2021-11-17 NOTE — PROGRESS NOTES
Transition of Care Plan:     RUR:16%  Disposition:Home  Follow up appointments:PCP, oral surgeon-Dr Maite Garcia, [phone]- schedule for Thursday at 10:45am  DME needed:none  Transportation at [address] [place] [address] Loop or means to access home:        IM Medicare Letter:2nd IM needed  Is patient a BCPI-A Bundle:                      If yes, was Bundle Letter given?:     Caregiver Contact:sisterLei [phone]  Discharge Caregiver contacted prior to discharge? 3:39pm  Oral surgery has been scheduled for 10:45 tomorrow. Patient has been scheduled to receive IV antibiotics w/HD, at [facility] on [address] starting Friday, Nov 19 at 7am.  LINDA faxed antibiotic order to [id number]([phone]).     Camacho Both  Ext [phone]

## 2021-11-17 NOTE — TELEPHONE ENCOUNTER
----- Message from Andi Jennings MD sent at 11/17/2021 11:43 AM EST -----  Please schedule clinic follow up with me (virtual is fine) in 2 months. Inpatient right now. Please msg back.        Thanks,     Andi Jennings MD  Infectious Diseases

## 2021-11-17 NOTE — PROGRESS NOTES
Pharmacy Antimicrobial Kinetic Dosing    Indication for Antimicrobials: Bacteremia   PMH:  Bacteremia 2021, ICD removal    Current Regimen of Each Antimicrobial:  Vancomycin 500 mg after HD - restarted 11/15 - Day 3  Ceftriaxone 2 gm IV q24h - started 11/10 - Day 8    Previous Antimicrobial Therapy:  Zosyn 3.275gm IV q12h HD regimen - started  day 6  Vancomycin HD dosing - started - (dose last given on )    Vancomycin Goal Level:  20     Date Dose & Interval Measured (mcg/mL) Predicted AUC/ZECHARIAH   21 0244 750 mg after HD 36.6    21 0540 Held 20.5    21 0555 500 mg  after HD 24.6      Significant Positive Cultures:    Blood - streptococcus intermedius -  - Final   Blood - NG - Final    Conditions for Dosing Consideration:     Labs:  Recent Labs     21  0947 11/15/21  0404   CREA 10.00* 7.70*   BUN 49* 25*     Recent Labs     21  0947 11/15/21  0404   WBC 16.6* 10.9     Temp (24hrs), Av °F (36.7 °C), Min:97.7 °F (36.5 °C), Max:98.2 °F (36.8 °C)    Creatinine Clearance (mL/min):   HD     Impression/Plan:   Pharmacy consulted for vancomycin dosing for Strep intermedius. Appropriate antimicrobials on board since . First negative culture on . ID wants at least 4 weeks. Dec 5 stop date? Level is at goal this morning. (20-25) Continue 500 mg after each hemodialysis. Would recommend repeating levels before 3rd outpatient hemodialysis session. Pharmacy will follow daily and adjust medications as appropriate for renal function and/or serum levels.     Thank you,  Myah Marcelino, PHARMD

## 2021-11-17 NOTE — PROGRESS NOTES
Nephrology Progress Note  Blaise Leblanc     www. [facility]  Phone - [phone]   Patient: Elvin Salgado    YOB: 1975        Date- 11/17/2021   Admit Date: 11/5/2021  CC: Follow up for ESRD          IMPRESSION & PLAN:   · ESRD- home HD 5 days per week- Follows up with Dr Siria Yu at [facility]  · Sec. hyperpara  · Hyperkalemia  · Left arm swelling- s/p angioplasty of left subclavian vein  · Gram positive sepsis-Coag negative staph aureus bacteremia 1/4  · Streptococcus intermedius bacteremia 4/4 bottles  · s/p lead extraction at [facility]  · Anemia of ckd  · Hx of renal tx in past times 2.  · Hypertension  · CAD  · Failed RTX times 2         H/o DVT, s/p ivc filter/ h/o HIT     PLAN-   NO HD TODAY   Continue calcitriol   epogen for anemia   Advise to follow low k diet.  Check bmp in am   Continue phoslo   Will keep him on TTS schedule for now   He will go to inpatient hd unit if he needs abx after discharge. D/w patient      Subjective: Interval History:   -bp stable  No c/o sob,  No c/o chest pain,   NO FEVER  Objective:   Vitals:    11/17/21 0034 11/17/21 0750 11/17/21 0913 11/17/21 1033   BP: 116/74 119/74  106/72   Pulse: 88 86  84   Resp: 18 17  18   Temp: 98 °F (36.7 °C) 97.8 °F (36.6 °C)  98.4 °F (36.9 °C)   TempSrc:       SpO2: 97% 95%  97%   Weight:   63.5 kg (139 lb 15.9 oz)    Height:          11/16 0701 - 11/17 0700  In: -   Out: 2000   Last 3 Recorded Weights in this Encounter    11/15/21 1355 11/16/21 0636 11/17/21 0913   Weight: 60 kg (132 lb 4.4 oz) 64.5 kg (142 lb 4.8 oz) 63.5 kg (139 lb 15.9 oz)      Physical exam:   GEN:NAD  NECK- Supple, no mass  RESP: No wheezing,clear b/l  CVS: S1,S2  RRR  NEURO: Normal speech, non focal  EXT: No Edema   PSYCH: Normal Mood      Chart reviewed. Pertinent Notes reviewed.      Data Review :  Recent Labs     11/16/21  0947 11/15/21  0404   * 132*   K 5.5* 4.9   CL 98 96*   CO2 27 28   BUN 49* 25*   CREA 10.00* 7.70*   * 89   CA 9.5 9.9   PHOS 4.3  --      Recent Labs     11/16/21  0947 11/15/21  0404   WBC 16.6* 10.9   HGB 8.7* 7.8*   HCT 27.9* 25.4*    173     No results for input(s): FE, TIBC, PSAT, FERR in the last 72 hours.    Medication list  reviewed  Current Facility-Administered Medications   Medication Dose Route Frequency    warfarin (COUMADIN) tablet 2.5 mg  2.5 mg Oral ONCE    epoetin emilie-epbx (RETACRIT) injection 20,000 Units  20,000 Units SubCUTAneous Q TUE, THU & SAT    iothalamate meglumine (CONRAY 30) 30 % contrast solution soln    PRN    lidocaine (PF) 10 mg/mL (1 %) 20 mL solution    CONTINUOUS    Vancomycin - Pharmacy to dose   Other Rx Dosing/Monitoring    0.9% sodium chloride infusion 250 mL  250 mL IntraVENous PRN    oxyCODONE IR (ROXICODONE) tablet 5 mg  5 mg Oral Q4H PRN    HYDROmorphone (DILAUDID) injection 1 mg  1 mg IntraVENous Q4H PRN    WARFARIN INFORMATION NOTE (COUMADIN)   Other QPM    aspirin chewable tablet 81 mg  81 mg Oral DAILY    calcitRIOL (ROCALTROL) capsule 0.5 mcg  0.5 mcg Oral DAILY    atorvastatin (LIPITOR) tablet 20 mg  20 mg Oral DAILY    calcium acetate(phosphat bind) (PHOSLO) capsule 1,334 mg  2 Capsule Oral TID WITH MEALS    metoprolol succinate (TOPROL-XL) XL tablet 12.5 mg  12.5 mg Oral DAILY    pantoprazole (PROTONIX) tablet 40 mg  40 mg Oral ACB    sodium chloride (NS) flush 5-40 mL  5-40 mL IntraVENous Q8H    sodium chloride (NS) flush 5-40 mL  5-40 mL IntraVENous PRN    acetaminophen (TYLENOL) tablet 650 mg  650 mg Oral Q6H PRN    Or    acetaminophen (TYLENOL) suppository 650 mg  650 mg Rectal Q6H PRN    polyethylene glycol (MIRALAX) packet 17 g  17 g Oral DAILY PRN    ondansetron (ZOFRAN ODT) tablet 4 mg  4 mg Oral Q8H PRN    Or    ondansetron (ZOFRAN) injection 4 mg  4 mg IntraVENous Q6H PRN          Kaylee Frye MD              [facility]  [facility], located within [facility] / [address]  [place], [address]  Phone - [phone]               Fax - [phone]

## 2021-11-17 NOTE — PROGRESS NOTES
Hospitalist Progress Note    NAME: Everardo Hunt   :  1975   MRN:  [id number]   Room Number:  2134/01  @ [facility]     Assessment / Plan:    Streptococcus intermedius bacteremia 4/4 bottles POA  Sepsis POA WBC 12.3, , RR 22-30, fever 103, normal lactate  Multiple dental caries POA ? Source of bacteremia  - Similar presentation in 2021 with persistent bacteremia Staph epidermitis          AICD removed. TTE showed mitral valve thickening.  - Presented with generalized body ache, nausea, vomiting.  - Admit BC with Strep intermedius  - CT abdomen/pelvisno acute abnormality.  - Chest x-rayNo focal airspace process. - Echo TTE LVEF 35-40%, PA Pressure 85, reduced RV function  - Echo EVER did not show vegetations, LVEF 45-50%, mod pulm HTN  - Tagged WBC Scan did not show abnormal uptake. - US chest did not show localized fluid collection.   - Duplex of HD AV graft did not show evidence of infection. - CT Maxillofacial r evealed possible periapical dental abscesses in multiple teeth. - S/P IV rocephin and vancomycin  - Repeat Bcx  with no growth  - ID, vascular surgery evaluated, vascular surgery felt AVG is not infected, s/p fistulogram 11/15/2021  - antibiotics weaned to vancomycin, will get at HD to complete 4 weeks       Will have to go to outpatient HD while antibiotics  - Renal to do HD tonight, will start  - CM set an outpatient oral surgery follow up in AM  - plan discharge in AM    End-stage renal disease on dialysis POA  - Follows up with Dr Geetha Ferguson at [facility]  Nephrology following on TTS schedule. Continue with the PhosLo and calcitriol. Acute on chronic anemia  - S/p 1 unit of PRBC. - HgB 8.7     History of DVT with graft thrombosis  -Continue Coumadin. Pharmacy managing warfarin. INR is 1.7.      Hypertension  GERD  CAD  History of DVT and graft thromboses on Coumadin  History of HIT  ICMP s/p ICP placement  Continue metoprolol, PPI, aspirin, Lipitor  Pharmacy managing Coumadin dosing  -daily INR     Body mass index is 21.93 kg/m². Code Status: Full code  DVT Prophylaxis: Coumadin  Baseline: Ambulatory at home       Subjective:     Patient was seen and examined. No acute events overnight. \"my stomach is hurting\"  Chronic stomach pain  Loose stools  No new complaints  Discussed with RN overnight events. Review of Systems:  Symptom Y/N Comments  Symptom Y/N Comments   Fever/Chills n   Chest Pain n    Poor Appetite    Edema     Cough n   Abdominal Pain n    Sputum    Joint Pain     SOB/ZAMUDIO n   Pruritis/Rash     Nausea/vomit n   Tolerating PT/OT     Diarrhea    Tolerating Diet y    Constipation    Other       Could NOT obtain due to:            Objective:     VITALS:   Last 24hrs VS reviewed since prior progress note. Most recent are:  Patient Vitals for the past 24 hrs:   Temp Pulse Resp BP SpO2   11/17/21 1448 98.4 °F (36.9 °C) 88 17 121/75 97 %   11/17/21 1033 98.4 °F (36.9 °C) 84 18 106/72 97 %   11/17/21 0750 97.8 °F (36.6 °C) 86 17 119/74 95 %   11/17/21 0034 98 °F (36.7 °C) 88 18 116/74 97 %   11/16/21 2215 98.1 °F (36.7 °C) 84 18 112/76 98 %   11/16/21 1946 98.1 °F (36.7 °C) 91 16 99/64 97 %     No intake or output data in the 24 hours ending 11/17/21 1753     PHYSICAL EXAM:  General: WD, WN. Alert, cooperative, no acute distress    EENT:  EOMI. Anicteric sclerae. MMM  Resp:  CTA bilaterally, no wheezing or rales. No accessory muscle use  CV:  Regular  rhythm,  normal S1/S2, no murmurs rubs gallops, No edema  GI:  Soft, Non distended, Non tender. +Bowel sounds  Neurologic:  Alert and oriented X 3, normal speech,   Psych:   Good insight. Not anxious nor agitated  Skin:  No rashes.   No jaundice    Reviewed most current lab test results and cultures  YES  Reviewed most current radiology test results   YES  Review and summation of old records today    NO  Reviewed patient's current orders and MAR    YES  PMH/SH reviewed - no change compared to H&P  ________________________________________________________________________  Care Plan discussed with:    Comments   Patient x    Family      RN x    Care Manager x    Consultant                       x Multidiciplinary team rounds were held today with , nursing, pharmacist and clinical coordinator. Patient's plan of care was discussed; medications were reviewed and discharge planning was addressed. ________________________________________________________________________  Total NON critical care TIME: 35  Minutes    Total CRITICAL CARE TIME Spent:   Minutes non procedure based      Comments   >50% of visit spent in counseling and coordination of care x    ________________________________________________________________________  Justus Lobato MD     Procedures: see electronic medical records for all procedures/Xrays and details which were not copied into this note but were reviewed prior to creation of Plan. LABS:  I reviewed today's most current labs and imaging studies.   Pertinent labs include:  Recent Labs     11/16/21  0947 11/15/21  0404   WBC 16.6* 10.9   HGB 8.7* 7.8*   HCT 27.9* 25.4*    173     Recent Labs     11/17/21  0555 11/16/21  0947 11/16/21  0302 11/15/21  0404   NA  --  135*  --  132*   K  --  5.5*  --  4.9   CL  --  98  --  96*   CO2  --  27  --  28   GLU  --  168*  --  89   BUN  --  49*  --  25*   CREA  --  10.00*  --  7.70*   CA  --  9.5  --  9.9   PHOS  --  4.3  --   --    ALB  --  2.6*  --   --    INR 2.0*  --  1.8* 1.6*       Signed: Justus Lobato MD

## 2021-11-17 NOTE — PROGRESS NOTES
Infectious Disease Progress Note         Interval:  Afebrile ON      Subjective:   Seen this morning in follow-up. Reports feeling well. Abdominal pain in the LUQ is improving. Good appetite. Denies fevers or chills at night. Objective:    Vitals:   Reviewed in chart. Physical Exam:  ? GEN: NAD  ? HEENT: Normocephalic, atraumatic, PERRL, no scleral icterus, multiple teeth missing. ? CV: Hemodynamically stable  ? Lungs: Room air  ? Abdomen: soft  ? Genitourinary:  no oneal  ? Extremities: no edema  ? Neuro: Alert, oriented to time, place and situation, moves all extremities to commands, verbal   ? Skin: no rash  ? Psych: good affect, good eye contact, non tearful             Labs:  Recent Results (from the past 24 hour(s))   PROTHROMBIN TIME + INR    Collection Time: 11/17/21  5:55 AM   Result Value Ref Range    INR 2.0 (H) 0.9 - 1.1      Prothrombin time 20.7 (H) 9.0 - 11.1 sec   VANCOMYCIN, RANDOM    Collection Time: 11/17/21  5:55 AM   Result Value Ref Range    Vancomycin, random 24.6 UG/ML             Assessment:  42y.o. 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  Patient has the nonendovascular ICD in his left chest from before. This was not removed at [facility] because the suspicion for involvement of this ICD from the infection was low. Also has hx of H/o DVT, s/p ivc filter/ h/o HIT.     This admission:  - Sepsis from Streptococcus intermedius bacteremia (in 3/4 bottles from 11/5/21, Pen G ZECHARIAH <0.06ug/ml) . Symptoms ongoing for ~6 days PTA.   EVER done 11/11/21. No vegetations. CT maxillofacial showed multiple periapical abscess - difficult to exclude. Source of the Streptococcus intermedius bacteremia is from the teeth. Patient will need dental care evaluation right after discharge. The hospitalist team has communicated with Dr. Angie Quezada. Blood cultures from 11/7/2021. - Left chest ICD is non-endovascular. Hence, not at high risk for infectious involvement from bloodstream infections. Left chest US no fluid collections at the ICD. No focal suspicion seen for infectious involvement currently. -Patient also had coagulase-negative staph in 1/4 bottles from 11/5/2021. This is likely a contaminant in the current clinical context, however given patient's past history with high-grade MRSE bacteremia, we have decided to treat with vancomycin. Vancomycin can be given with HD.  - On 11/15/2021 underwent angiogram of left arm dialysis graft and angioplasty of left subclavian vein for left arm swelling due to central venous stenosis, by vascular surgery. No concern for infection at this time.       Recommendations:  -Vancomycin dosed therapeutic as below. Will stop ceftriaxone today. -Vancomycin 500 mg qHD, for goal trough 15-20. Vancomycin troughs must be obtained as needed to maintain trough levels of 15-20. Dose must be adjusted if supratherapeutic or subtherapeutic trough seen. -Duration 4 weeks, 11/7/2021 to 12/5/21.   - CT maxillofacial showed multiple periapical abscess - difficult to exclude. Source of the Streptococcus intermedius bacteremia is from the teeth. Patient will need dental care evaluation right after discharge. Once patient leaves, we call the follow office to schedule to see Dr. Angie Quezada (Oral and Facial surgery) immediately after discharge:    [facility], [address] [place] [address] [address]  Tel: [facility] Office Phone Number [phone]  Fax: [phone] - ID clinic follow-up in 2 months.            · Please have labs done on weekly basis for CBC with diff, BMP and have results sent to me by faxing to  [phone]. · Call with critical labs at [phone]. · If patient is on Vancomycin IV, please check Vancomycin trough send results to me by faxing to [phone]. For IV Vancomycin, trough goal is 15-20. Thank you for the opportunity to participate in the care of this patient. Please contact with questions or concerns.       Jeremias Gardiner MD  Infectious Diseases

## 2021-11-17 NOTE — PROGRESS NOTES
Pharmacist Daily Dosing of Warfarin    Indication & Goal INR: AFib, INR Goal 2-3   has pacemaker, Hx DVT and graft thromboses, Hx HIT    PTA Warfarin Dose: 2.5 mg daily    Notable concurrent conditions and medications:     Labs:  Recent Labs     11/17/21  0555 11/16/21  0947 11/16/21  0302 11/15/21  0404 11/15/21  0404   INR 2.0*  --  1.8*  --  1.6*   HGB  --  8.7*  --    < > 7.8*   PLT  --  208  --   --  173   ALB  --  2.6*  --   --   --     < > = values in this interval not displayed. Impression/Plan:   Warfarin 2.5 mg this afternoon. Daily INR ordered  CBC w/o differential every other day has been ordered     Pharmacy will follow daily and adjust the dose as appropriate.     Thank you,  Nato Mendez, CHIQUITAD

## 2021-11-18 VITALS
HEART RATE: 95 BPM | HEIGHT: 67 IN | DIASTOLIC BLOOD PRESSURE: 67 MMHG | BODY MASS INDEX: 22.88 KG/M2 | SYSTOLIC BLOOD PRESSURE: 113 MMHG | OXYGEN SATURATION: 96 % | RESPIRATION RATE: 16 BRPM | TEMPERATURE: 99.1 F | WEIGHT: 145.8 LBS

## 2021-11-18 LAB
ALBUMIN SERPL-MCNC: 2.6 G/DL (ref 3.5–5)
ALBUMIN/GLOB SERPL: 0.5 {RATIO} (ref 1.1–2.2)
ALP SERPL-CCNC: 157 U/L (ref 45–117)
ALT SERPL-CCNC: 12 U/L (ref 12–78)
ANION GAP SERPL CALC-SCNC: 7 MMOL/L (ref 5–15)
AST SERPL-CCNC: 22 U/L (ref 15–37)
BASOPHILS # BLD: 0.2 K/UL (ref 0–0.1)
BASOPHILS NFR BLD: 1 % (ref 0–1)
BILIRUB SERPL-MCNC: 0.4 MG/DL (ref 0.2–1)
BUN SERPL-MCNC: 51 MG/DL (ref 6–20)
BUN/CREAT SERPL: 6 (ref 12–20)
CALCIUM SERPL-MCNC: 9.3 MG/DL (ref 8.5–10.1)
CHLORIDE SERPL-SCNC: 99 MMOL/L (ref 97–108)
CO2 SERPL-SCNC: 28 MMOL/L (ref 21–32)
CREAT SERPL-MCNC: 8.33 MG/DL (ref 0.7–1.3)
DIFFERENTIAL METHOD BLD: ABNORMAL
EOSINOPHIL # BLD: 3.6 K/UL (ref 0–0.4)
EOSINOPHIL NFR BLD: 22 % (ref 0–7)
ERYTHROCYTE [DISTWIDTH] IN BLOOD BY AUTOMATED COUNT: 18.1 % (ref 11.5–14.5)
GLOBULIN SER CALC-MCNC: 4.8 G/DL (ref 2–4)
GLUCOSE SERPL-MCNC: 80 MG/DL (ref 65–100)
HCT VFR BLD AUTO: 24.3 % (ref 36.6–50.3)
HGB BLD-MCNC: 7.7 G/DL (ref 12.1–17)
IMM GRANULOCYTES # BLD AUTO: 0 K/UL (ref 0–0.04)
IMM GRANULOCYTES NFR BLD AUTO: 0 % (ref 0–0.5)
INR PPP: 1.7 (ref 0.9–1.1)
LYMPHOCYTES # BLD: 2.3 K/UL (ref 0.8–3.5)
LYMPHOCYTES NFR BLD: 14 % (ref 12–49)
MCH RBC QN AUTO: 30.6 PG (ref 26–34)
MCHC RBC AUTO-ENTMCNC: 31.7 G/DL (ref 30–36.5)
MCV RBC AUTO: 96.4 FL (ref 80–99)
MONOCYTES # BLD: 1 K/UL (ref 0–1)
MONOCYTES NFR BLD: 6 % (ref 5–13)
NEUTS SEG # BLD: 9.3 K/UL (ref 1.8–8)
NEUTS SEG NFR BLD: 57 % (ref 32–75)
NRBC # BLD: 0 K/UL (ref 0–0.01)
NRBC BLD-RTO: 0 PER 100 WBC
PATH REV BLD -IMP: NORMAL
PHOSPHATE SERPL-MCNC: 3.5 MG/DL (ref 2.6–4.7)
PLATELET # BLD AUTO: 181 K/UL (ref 150–400)
PMV BLD AUTO: 11.7 FL (ref 8.9–12.9)
POTASSIUM SERPL-SCNC: 5.3 MMOL/L (ref 3.5–5.1)
PROCALCITONIN SERPL-MCNC: 1 NG/ML
PROT SERPL-MCNC: 7.4 G/DL (ref 6.4–8.2)
PROTHROMBIN TIME: 17.7 SEC (ref 9–11.1)
RBC # BLD AUTO: 2.52 M/UL (ref 4.1–5.7)
RBC MORPH BLD: ABNORMAL
RBC MORPH BLD: ABNORMAL
SODIUM SERPL-SCNC: 134 MMOL/L (ref 136–145)
WBC # BLD AUTO: 16.4 K/UL (ref 4.1–11.1)

## 2021-11-18 PROCEDURE — 2709999900 HC NON-CHARGEABLE SUPPLY

## 2021-11-18 PROCEDURE — 80053 COMPREHEN METABOLIC PANEL: CPT

## 2021-11-18 PROCEDURE — 74011250636 HC RX REV CODE- 250/636: Performed by: SURGERY

## 2021-11-18 PROCEDURE — 90935 HEMODIALYSIS ONE EVALUATION: CPT

## 2021-11-18 PROCEDURE — 84100 ASSAY OF PHOSPHORUS: CPT

## 2021-11-18 PROCEDURE — 74011250636 HC RX REV CODE- 250/636: Performed by: INTERNAL MEDICINE

## 2021-11-18 PROCEDURE — 74011250637 HC RX REV CODE- 250/637: Performed by: SURGERY

## 2021-11-18 PROCEDURE — 85610 PROTHROMBIN TIME: CPT

## 2021-11-18 PROCEDURE — 85025 COMPLETE CBC W/AUTO DIFF WBC: CPT

## 2021-11-18 PROCEDURE — 36415 COLL VENOUS BLD VENIPUNCTURE: CPT

## 2021-11-18 PROCEDURE — 84145 PROCALCITONIN (PCT): CPT

## 2021-11-18 PROCEDURE — 74011000258 HC RX REV CODE- 258: Performed by: INTERNAL MEDICINE

## 2021-11-18 RX ORDER — WARFARIN 1 MG/1
4 TABLET ORAL ONCE
Status: DISCONTINUED | OUTPATIENT
Start: 2021-11-18 | End: 2021-11-18 | Stop reason: HOSPADM

## 2021-11-18 RX ORDER — OXYCODONE HYDROCHLORIDE 5 MG/1
5-10 TABLET ORAL
Qty: 16 TABLET | Refills: 0 | Status: SHIPPED | OUTPATIENT
Start: 2021-11-18 | End: 2021-11-23

## 2021-11-18 RX ORDER — VANCOMYCIN HYDROCHLORIDE 1 G/200ML
750 INJECTION, SOLUTION INTRAVENOUS
Qty: 6000 MG | Refills: 0 | Status: SHIPPED
Start: 2021-11-19 | End: 2021-12-24

## 2021-11-18 RX ADMIN — Medication 5 ML: at 06:46

## 2021-11-18 RX ADMIN — PANTOPRAZOLE SODIUM 40 MG: 40 TABLET, DELAYED RELEASE ORAL at 12:18

## 2021-11-18 RX ADMIN — ASPIRIN 81 MG: 81 TABLET, CHEWABLE ORAL at 12:11

## 2021-11-18 RX ADMIN — CALCIUM ACETATE 1334 MG: 667 CAPSULE ORAL at 16:41

## 2021-11-18 RX ADMIN — CALCITRIOL CAPSULES 0.25 MCG 0.5 MCG: 0.25 CAPSULE ORAL at 12:11

## 2021-11-18 RX ADMIN — HYDROMORPHONE HYDROCHLORIDE 1 MG: 1 INJECTION, SOLUTION INTRAMUSCULAR; INTRAVENOUS; SUBCUTANEOUS at 12:11

## 2021-11-18 RX ADMIN — VANCOMYCIN HYDROCHLORIDE 500 MG: 500 INJECTION, POWDER, LYOPHILIZED, FOR SOLUTION INTRAVENOUS at 15:10

## 2021-11-18 RX ADMIN — ATORVASTATIN CALCIUM 20 MG: 20 TABLET, FILM COATED ORAL at 12:11

## 2021-11-18 RX ADMIN — Medication 10 ML: at 15:12

## 2021-11-18 RX ADMIN — HYDROMORPHONE HYDROCHLORIDE 1 MG: 1 INJECTION, SOLUTION INTRAMUSCULAR; INTRAVENOUS; SUBCUTANEOUS at 06:46

## 2021-11-18 RX ADMIN — METOPROLOL SUCCINATE 12.5 MG: 25 TABLET, EXTENDED RELEASE ORAL at 12:11

## 2021-11-18 RX ADMIN — CALCIUM ACETATE 1334 MG: 667 CAPSULE ORAL at 12:15

## 2021-11-18 RX ADMIN — HYDROMORPHONE HYDROCHLORIDE 1 MG: 1 INJECTION, SOLUTION INTRAMUSCULAR; INTRAVENOUS; SUBCUTANEOUS at 02:14

## 2021-11-18 NOTE — DISCHARGE INSTRUCTIONS
Patient Discharge Instructions    Yuki Watts / [id number] : 1975    Admitted 2021 Discharged: 2021         DISCHARGE DIAGNOSIS:     Strep intermedius bacteremia    Dental carries with abscesses    Fever (2021)           What to do at Home    1. Recommended diet: Renal Diet    2. Recommended activity: Activity as tolerated    3. If you experience any of the following symptoms then please call your primary care physician or return to the emergency room if you cannot get hold of your doctor:   Fevers > 100.5, chills   Nausea or vomiting, persistent diarrhea > 24 hours   Blood in stool or black stools   Chest pain or SOB      Follow-up Information     Follow up With Specialties Details Why Contact Info    Ari Ruiz MD Family Medicine  Office will contact patient to schedule a follow-up appointment [facility]  [phone]      Erik Thomas MD Infectious Disease Schedule an appointment as soon as possible for a visit in 2 months  [address] [place] [address] [address] [phone]      [facility]  [facility] [address] [facility]  [phone]    Dr Priscilla Browne  On 2021 10;30am   for  Oral surgery [address]  [phone]      Erik Thomas MD Infectious Disease Schedule an appointment as soon as possible for a visit in 2 months  [address] [place] [address] 83.  [address] [facility], [address] [place] [address] Vascular Surgery, Interventional Cardiology, Cardiology  keep next scheduled appointment [address]  [address]  [facility] [address] [phone]          Vancomycin 750 mg after each dialysis for goal trough 15-20.              Duration 4 weeks, 2021 to 21.      ID clinic follow-up in 2 months with Dr Jessica Aguiar      Please have labs done on weekly basis at HD for CBC with diff, BMP, vanco trough and have results sent to  [facility] by faxing to  [phone]. Call with critical labs at [phone]. Information obtained by :  I understand that if any problems occur once I am at home I am to contact my physician. I understand and acknowledge receipt of the instructions indicated above.                                                                                                                                            Physician's or R.N.'s Signature                                                                  Date/Time                                                                                                                                              Patient or Representative Signature                                                          Date/Time

## 2021-11-18 NOTE — PROGRESS NOTES
Comprehensive Nutrition Assessment    Type and Reason for Visit: Reassess    Nutrition Recommendations/Plan:   · Modify current diet - Regular; low potassium restriction   · Continue ONS   · Encourage PO consumption of meals and ONS  · Please document % meals and supplements consumed in flowsheet I/O's under intake     Nutrition Assessment: Chart reviewed. Pt remains on HD 5 days per week, per documentation. Oral surgery scheduled today for 10:45 AM, per CM note. Attempted to visit pt at bedside, but was out of room. Poor PO intake and fair appetite, per flowsheet documentation. Pt will benefit from encouraging consumption of meals/ONS. Will continue ONS d/t increased needs. K elevated at 5.5; Phos WNL - will add potassium restriction. Will continue to monitor. Patient Vitals for the past 168 hrs:   % Diet Eaten   11/17/21 1808 1 - 25%   11/17/21 1340 1 - 25%   11/17/21 0913 1 - 25%   11/15/21 0930 51 - 75%   11/15/21 0837 0%     Estimated Daily Nutrient Needs:  Energy (kcal): 1962 kcal (BMR 1509 × 1. 3AF); Weight Used for Energy Requirements: Current  Protein (g): 79-92 g/day (1.2-1.4 g/kg BW); Weight Used for Protein Requirements: Current  Fluid (ml/day): 1500mL; Method Used for Fluid Requirements: Standard renal    Nutrition Related Findings:  Labs: Na 135-132, K 5.5, BUN 49-25. Meds: lipitor, calcitriol, calcium acetate, retacrit, protonix, coumadin. Edema: 1+ LUE. BM 11/17      Wounds:    Surgical incision - left arm (11/15)       Current Nutrition Therapies:  ADULT ORAL NUTRITION SUPPLEMENT Dinner, Breakfast; Renal Supplement  ADULT DIET Regular    Anthropometric Measures:  · Height:  5' 7\" (170.2 cm)  · Current Body Wt:  66.1 kg (145 lb 11.6 oz)   · Ideal Body Wt:  148 lbs:  89.4 %   · BMI Category:  Normal weight (BMI 18.5-24. 9)       Nutrition Diagnosis:   · Increased nutrient needs related to renal dysfunction (ESRD on HD) as evidenced by  (est protein needs 72-84g/day)  -diagnosis continues Nutrition Interventions:   Food and/or Nutrient Delivery: Continue current diet, Continue oral nutrition supplement  Nutrition Education and Counseling: No recommendations at this time  Coordination of Nutrition Care: Continue to monitor while inpatient    Goals:  PO intake >60% of meals/ONS next 3-5 days       Nutrition Monitoring and Evaluation:   Behavioral-Environmental Outcomes: None identified  Food/Nutrient Intake Outcomes: Food and nutrient intake, Supplement intake  Physical Signs/Symptoms Outcomes: Biochemical data, GI status, Weight, Fluid status or edema    Discharge Planning:    Continue oral nutrition supplement, Continue current diet     Electronically signed by Junior Alvarado on 11/18/2021 at 10:59 AM

## 2021-11-18 NOTE — PROGRESS NOTES
Nephrology Progress Note  Blaise Leblanc     [email or web address]  Phone - [phone]   Patient: Sharon Bosch    YOB: 1975        Date- 11/18/2021   Admit Date: 11/5/2021  CC: Follow up for ESRD          IMPRESSION & PLAN:   · ESRD- home HD 5 days per week- Follows up with Dr Rhett Scott at [facility]  · Sec. hyperpara  · Hyperkalemia  · Left arm swelling- s/p angioplasty of left subclavian vein  · Gram positive sepsis-Coag negative staph aureus bacteremia 1/4  · Streptococcus intermedius bacteremia 4/4 bottles  · s/p lead extraction at [facility]  · Anemia of ckd  · Hx of renal tx in past times 2.  · Hypertension  · CAD  · Failed RTX times 2         H/o DVT, s/p ivc filter/ h/o HIT     PLAN-   Seen on hemodialysis today.  Will be getting discharged after dialysis and going to [facility] for in center hemodialysis for IV antibiotics.  I have notified Dr. Rhett Scott who is his primary nephrologist at [facility].  Continue calcitriol   epogen for anemia   Advise to follow low k diet.  Check bmp in am   Continue phoslo   Will keep him on TTS schedule for now     Subjective: Interval History:   -Seen and examined on hemodialysis today.  -No issues overnight  Objective:   Vitals:    11/18/21 1015 11/18/21 1030 11/18/21 1045 11/18/21 1100   BP: 128/70 105/66 130/74 131/81   Pulse: 77 75 85 86   Resp: 15 15 15 15   Temp:       TempSrc:       SpO2:       Weight:       Height:          11/17 0701 - 11/18 0700  In: 120 [P.O.:120]  Out: -   Last 3 Recorded Weights in this Encounter    11/16/21 0636 11/17/21 0913 11/17/21 2058   Weight: 64.5 kg (142 lb 4.8 oz) 63.5 kg (139 lb 15.9 oz) 66.1 kg (145 lb 12.8 oz)      Physical exam:   GEN:NAD  NECK- Supple, no mass  RESP: No wheezing,clear b/l  CVS: S1,S2  RRR  NEURO: Normal speech, non focal  EXT: No Edema   PSYCH: Normal Mood      Chart reviewed. Pertinent Notes reviewed.      Data Review :  Recent Labs     11/18/21  9837 11/16/21  0947   * 135*   K 5.3* 5.5*   CL 99 98   CO2 28 27   BUN 51* 49*   CREA 8.33* 10.00*   GLU 80 168*   CA 9.3 9.5   PHOS 3.5 4.3     Recent Labs     11/18/21  0817 11/16/21  0947   WBC 16.4* 16.6*   HGB 7.7* 8.7*   HCT 24.3* 27.9*    208     No results for input(s): FE, TIBC, PSAT, FERR in the last 72 hours.    Medication list  reviewed  Current Facility-Administered Medications   Medication Dose Route Frequency    epoetin emilie-epbx (RETACRIT) injection 20,000 Units  20,000 Units SubCUTAneous Q TUE, THU & SAT    iothalamate meglumine (CONRAY 30) 30 % contrast solution soln    PRN    lidocaine (PF) 10 mg/mL (1 %) 20 mL solution    CONTINUOUS    Vancomycin - Pharmacy to dose   Other Rx Dosing/Monitoring    0.9% sodium chloride infusion 250 mL  250 mL IntraVENous PRN    oxyCODONE IR (ROXICODONE) tablet 5 mg  5 mg Oral Q4H PRN    HYDROmorphone (DILAUDID) injection 1 mg  1 mg IntraVENous Q4H PRN    WARFARIN INFORMATION NOTE (COUMADIN)   Other QPM    aspirin chewable tablet 81 mg  81 mg Oral DAILY    calcitRIOL (ROCALTROL) capsule 0.5 mcg  0.5 mcg Oral DAILY    atorvastatin (LIPITOR) tablet 20 mg  20 mg Oral DAILY    calcium acetate(phosphat bind) (PHOSLO) capsule 1,334 mg  2 Capsule Oral TID WITH MEALS    metoprolol succinate (TOPROL-XL) XL tablet 12.5 mg  12.5 mg Oral DAILY    pantoprazole (PROTONIX) tablet 40 mg  40 mg Oral ACB    sodium chloride (NS) flush 5-40 mL  5-40 mL IntraVENous Q8H    sodium chloride (NS) flush 5-40 mL  5-40 mL IntraVENous PRN    acetaminophen (TYLENOL) tablet 650 mg  650 mg Oral Q6H PRN    Or    acetaminophen (TYLENOL) suppository 650 mg  650 mg Rectal Q6H PRN    polyethylene glycol (MIRALAX) packet 17 g  17 g Oral DAILY PRN    ondansetron (ZOFRAN ODT) tablet 4 mg  4 mg Oral Q8H PRN    Or    ondansetron (ZOFRAN) injection 4 mg  4 mg IntraVENous Q6H PRN          Burt Severs, MD              [facility]  Formerly [facility] [address] [facility], [address] [place], [address]  Phone - [phone]               Fax - [phone]

## 2021-11-18 NOTE — INTERDISCIPLINARY ROUNDS
Interdisciplinary Rounds were completed on 11/18/21 for this patient. Rounds included nursing, clinical care leader, pharmacy, and case management. Plan of care discussed. See clinical pathway and/or care plan for interventions and desired outcomes.

## 2021-11-18 NOTE — PROGRESS NOTES
Pharmacy Antimicrobial Kinetic Dosing    Indication for Antimicrobials: Bacteremia   PMH:  Bacteremia 2021, ICD removal    Current Regimen of Each Antimicrobial:  Vancomycin 500 mg after HD - restarted 11/15 - Day 3  Ceftriaxone 2 gm IV q24h - started 11/10 - Day 8    Previous Antimicrobial Therapy:  Zosyn 3.275gm IV q12h HD regimen - started  day 6  Vancomycin HD dosing - started - (dose last given on )    Vancomycin Goal Level:  20     Date Dose & Interval Measured (mcg/mL) Predicted AUC/ZECHARIAH   21 0244 750 mg after HD 36.6    21 0540 Held 20.5    21 0555 500 mg  after HD 24.6      Significant Positive Cultures:    Blood - streptococcus intermedius -  - Final   Blood - NG - Final    Conditions for Dosing Consideration:     Labs:  Recent Labs     21  0817 21  0947   CREA 8.33* 10.00*   BUN 51* 49*   PCT 1.00  --      Recent Labs     21  0817 21  0947   WBC 16.4* 16.6*     Temp (24hrs), Av.4 °F (36.9 °C), Min:98 °F (36.7 °C), Max:98.7 °F (37.1 °C)    Creatinine Clearance (mL/min):   HD     Impression/Plan:   Pharmacy consulted for vancomycin dosing for Strep intermedius. Appropriate antimicrobials on board since . First negative culture on . ID wants at least 4 weeks. Dec 5 stop date? Dosing, dialysis and serum level history analyzed starting with level on  of 20.5 mcg/ml. As vancomycin was still on board at time of 1500 mg LD on 11/15 the patients clearance is higher and a dose of 750 mg HD is recommended. . Would recommend repeating levels before 3rd outpatient hemodialysis session. Pharmacy will follow daily and adjust medications as appropriate for renal function and/or serum levels.     Thank you,  Pat Claros, [facility]

## 2021-11-18 NOTE — PROCEDURES
[facility] [place] / [phone]    Vitals Pre Post Assessment Pre Post   BP BP: 130/88 (11/18/21 0811) 133/82 LOC A&Ox4 A&Ox4   HR Pulse (Heart Rate): 86 (11/18/21 0811) 86 Lungs Dim bases clear   Resp Resp Rate: 15 (11/18/21 0811) 15 Cardiac Reg S1 S2 Reg S1 S2   Temp Temp: 98.3 °F (36.8 °C) (11/18/21 0811) 98.0, oral Skin CDI CDI   Weight  n/a n/a Edema none none   Tele status none none Pain 0; medicated prior to arival 0     Orders   Duration: Start: 4145 End: 2718 Total: 3hr   Dialyzer: Dialyzer/Set Up Inspection: Revaclear (11/18/21 0811)   Jen Jeff Bath: Dialysate K (mEq/L): 2 (11/18/21 0811)   Ca Bath: Dialysate CA (mEq/L): 2.5 (11/18/21 0811)   Na: Dialysate NA (mEq/L): 140 (11/18/21 0811)   Bicarb: Dialysate HCO3 (mEq/L): 40 (11/18/21 0811)   Target Fluid Removal: Goal/Amount of Fluid to Remove (mL): 2000 mL (11/18/21 0811)     Access   Type & Location: SUZANNE AVG   Comments:    skin CDI. No s/s of infection. + B/T. No issues with cannulation or hemostasis. Running well at .                                   Labs   HBsAg (Antigen) / date: Neg Ag 11/11/2021                                             HBsAb (Antibody) / date: Immune 11/11/2021   Source: EPIC   Obtained/Reviewed  Critical Results Called HGB   Date Value Ref Range Status   11/18/2021 7.7 (L) 12.1 - 17.0 g/dL Final     Potassium   Date Value Ref Range Status   11/16/2021 5.5 (H) 3.5 - 5.1 mmol/L Final     Calcium   Date Value Ref Range Status   11/16/2021 9.5 8.5 - 10.1 MG/DL Final     BUN   Date Value Ref Range Status   11/16/2021 49 (H) 6 - 20 MG/DL Final     Creatinine   Date Value Ref Range Status   11/16/2021 10.00 (H) 0.70 - 1.30 MG/DL Final        Meds Given   Name Dose Route   None ordered                 Adequacy / Fluid    Total Liters Process: 76.5L   Net Fluid Removed: 2000ml      Comments   Time Out Done:   (Time) 0809   Admitting Diagnosis: Bactermia/ Fever   Consent obtained/signed: Informed Consent Verified: Yes (11/18/21 9626) Machine / RO # Machine Number: [id number] (11/18/21 8571)   Primary Nurse Rpt Pre: Delia Lobo RN   Primary Nurse Rpt Post:    Pt Education: Procedural/ site rotation   Care Plan: D/C after HD   Pts outpatient clinic: [facility] home hemo usually     Tx Summary   Comments:     Pt arrived to HD suite A&Ox4. Consent signed & on file. SBAR received from Primary RN.  5009: Pt cannulated with 05E needles per policy & without issue. Labs drawn per request/ order. VSS. Dialysis Tx initiated. * no issues with HD*  1112: Tx ended. VSS. All possible blood returned to patient. Hemostasis achieved without issue. Bed locked and in the lowest position, call bell and belongings in reach. SBAR given to Primary, RN. Patient is stable at time of their departure. All Dialysis related medications have been reviewed.

## 2021-11-18 NOTE — DISCHARGE SUMMARY
Hospitalist Discharge Note    NAME: Sukhjinder Croft   :  1975   MRN:  [id number]   Room Number:  2134/01  @ [place] [facility]     Admit date: 2021    Discharge date: 21    PCP: Amber Rosales MD    Discharge Diagnoses:    Streptococcus intermedius bacteremia 4/4 bottles POA    Sepsis POA WBC 12.3, , RR 22-30, fever 103, normal lactate    Multiple dental caries POA ? Source of bacteremia    End-stage renal disease on dialysis POA      Acute on chronic anemia POA S/p 1 unit of PRBC. 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 Code Status: Full code    Discharge Medications:  Current Discharge Medication List      START taking these medications    Details   oxyCODONE IR (ROXICODONE) 5 mg immediate release tablet Take 1-2 Tablets by mouth every six (6) hours as needed for Pain for up to 5 days. Max Daily Amount: 40 mg.  Qty: 16 Tablet, Refills: 0    Associated Diagnoses: Abscess of apex of dental root complicating chronic inflammation; Sepsis due to group B Streptococcus with acute organ dysfunction without septic shock, unspecified type ([facility]      vancomycin in 0.9% sodium chloride (VANCOCIN) 1 gram/200 mL pgbk 150 mL by IntraVENous route every Monday, Wednesday, Friday for 8 doses. After hemodialysis treatment  Qty: 6000 mg, Refills: 0         CONTINUE these medications which have NOT CHANGED    Details   ondansetron (Zofran ODT) 4 mg disintegrating tablet 1 Tab by SubLINGual route every eight (8) hours as needed for Nausea or Vomiting. Qty: 20 Tab, Refills: 0      metoprolol succinate (TOPROL-XL) 25 mg XL tablet Take 0.5 Tabs by mouth daily. Qty: 30 Tab, Refills: 0      calcium acetate,phosphat bind, (PHOSLO) 667 mg cap Take 2 Caps by mouth three (3) times daily (with meals).  Indications: low amount of calcium in the blood, renal osteodystrophy with hyperphosphatemia  Qty: 120 Cap, Refills: 0      atorvastatin (Lipitor) 20 mg tablet Take 20 mg by mouth daily. warfarin (COUMADIN) 2.5 mg tablet Take 2.5 mg by mouth daily. acetaminophen (TYLENOL) 500 mg tablet Take 1 Tab by mouth every four (4) hours as needed for Pain. Over the counter  Qty: 30 Tab, Refills: 0      omeprazole (PRILOSEC) 20 mg capsule Take 20 mg by mouth daily. calcitRIOL (ROCALTROL) 0.5 mcg capsule Take 0.5 mcg by mouth daily. aspirin 81 mg chewable tablet Take 81 mg by mouth daily. Follow-up Information     Follow up With Specialties Details Why Contact Info    Waqar Harrington MD Family Medicine  Office will contact patient to schedule a follow-up appointment [facility]  [phone]      Daljit Amezquita MD Infectious Disease Schedule an appointment as soon as possible for a visit in 2 months  . [address]  [facility] [address]  [phone]      [facility]  On 11/19/2021 7:00am for HD [facility]  [phone]    Dr Terrie West  On 12/23/2021 10;30am   for  Oral surgery [facility], [address] [place] [address] [address]  [phone]      Daljit Amezquita MD Infectious Disease Schedule an appointment as soon as possible for a visit in 2 months  [address] [address]  [phone]          Vancomycin 750 mg after each dialysis for goal trough 15-20. Duration 4 weeks, 11/7/2021 to 12/5/21.      ID clinic follow-up in 2 months with Dr Malika Sharma      Please have labs done on weekly basis at HD for CBC with diff, BMP, vanco trough and have results sent to Dr Malika Sharma                         by faxing to  [phone]. Call with critical labs at [phone].     Time spent on discharge:   I spent greater than 30 minutes on discharge, seeing and examining the patient, reconciling home meds and new meds, coordinating care with case management, doing the discharge papers and the D/C summary    Discharge disposition: home    Discharge Condition: Stable    Summary of admission H+P(copied from Dr Esperanza Thompson Note):     CHIEF COMPLAINT: Nausea and vomiting, generalized weakness     HISTORY OF PRESENT ILLNESS:     Annalee Gurrola is a 42 y.o.  male who presents with nausea vomiting generalized weakness for 3 days. Patient past medical history of end-stage renal disease on dialysis, hypertension, CAD, DVT, systolic CHF with cardiomyopathy, gout. Patient stated for the last 3 days he is having nausea and vomiting along with fever with body ache and generalized weakness. Patient to dialysis at home but because of the weakness skip the dialysis today. Denies any chest pain, no shortness of breath, no cough, no changes in the weight and appetite, no other complaints. Similar presentation in the past for the patient has bacteremia.     We were asked to admit for work up and evaluation of the above problems.           Past Medical History:   Diagnosis Date    Abdominal hematoma      AICD (automatic cardioverter/defibrillator) present      CAD (coronary artery disease)       anterior MI s/p 2 stents  2007    Chronic abdominal pain      Chronic kidney disease       ESRD; Dialysis dependent.  Home [facility] does labs- [facility] tpke    DVT (deep venous thrombosis) ([facility] 2001    DVT of popliteal vein ([facility] 11/2011     not anticoagulated due to bleeding, IVC filter    Endocarditis      Gallstone pancreatitis      Gastrointestinal disorder       acid reflux    Gastrointestinal disorder       peptic ulcer    Hemodialysis patient ([facility]      High cholesterol      Hypertension      Kidney transplant       b/l kidneys 1995, 2001    Long term current use of anticoagulant therapy      Nephrotic syndrome      Other ill-defined conditions(799.89)       kidny transplant x2,  on dialysis    Other ill-defined conditions(799.89)       home dialysis    Other ill-defined conditions(799.89)       hx recurrent left leg DVT    Peritonitis ([facility]      Seizures ([facility] 2015     most recent- only had three in lifetime    Small bowel obstruction (HCC)      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[facility]       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    V-tach ([facility]        Admit pCXR read by radiology FINDINGS:   A portable AP radiograph of the chest was obtained at 0414 hours. The  patient is on a cardiac monitor. Right axillary stent is noted. One electrode is  left in place, overlying the spine. Mild cardiomegaly is redemonstrated with  bilateral hilar prominence. There is no focal airspace process. IMPRESSION  No focal airspace process. Mild cardiomegaly with probable pulmonary  arterial enlargement    Admit abdomen/pelvis CT scan read by radiology FINDINGS:   LOWER THORAX: Small right pleural effusion. Cardiomegaly. Minimal bibasilar  subsegmental atelectasis. LIVER: No mass. Lobulated contour. BILIARY TREE: Prior cholecystectomy CBD is not dilated. SPLEEN: within normal limits. PANCREAS: No focal abnormality. ADRENALS: Unremarkable. KIDNEYS/URETERS: No hydronephrosis. Small native kidneys. Calcified small  bilateral pelvic transplanted kidneys. STOMACH: Unremarkable. SMALL BOWEL: No dilatation or wall thickening. Anastomosis on the left. COLON: No dilatation or wall thickening. APPENDIX: Not seen  PERITONEUM: No ascites or pneumoperitoneum. Stable calcified mesenteric mass,  measuring 3.1 x 2.3 cm (axial image 39). RETROPERITONEUM: No lymphadenopathy or aortic aneurysm. Left iliac stent in  place. REPRODUCTIVE ORGANS: Normal prostate  URINARY BLADDER: No mass or calculus. BONES: No destructive bone lesion. Diffuse sclerosis, compatible with renal  osteodystrophy. ABDOMINAL WALL: No mass or hernia.   ADDITIONAL COMMENTS: N/A  IMPRESSION  No acute abdominal or pelvic process seen    NM WBC inflammation scan read by radiology results:  Whole-body images were obtained utilizing 25 mCi Tc-99M Ceretec labeled white  blood cells. There is normal tracer distribution. No abnormal uptake is  identified to suggest a source for underlying sepsis. IMPRESSION  Whole body nuclear WBC scan shows no abnormal uptake to suggest  underlying etiology for sepsis. US duplex hemodialysis graft read by radiology   · Left upper extremity hemodialysis graft appears patent. · Small pseudoaneurysm noted noted in the mid portion of the Left AVG. · No obvious perigraft fluid collection noted. [facility]    CT maxillofacial 11/10/2021 read by radiology FINDINGS:   Osseous evaluation is hampered by diffuse alteration in bone density compatible  with renal osteodystrophy. Multiple teeth demonstrate periapical lucency, any of which could be  infectious-periapical abscess, most prominent involving the 2 posterior-most  right mandibular molars; however, there is no cortical breakthrough or  perimandibular soft tissue swelling to confirm. Paranasal sinuses show mild multifocal mucosal thickening, without fluid level  or mucocele. Soft tissues of the face/neck show no apparent soft tissue inflammation or  abscess on unenhanced imaging. Bilateral low-grade multilevel cervical  adenopathy is nonspecific. There is extensive vascular calcification. IMPRESSION  Difficult to exclude periapical dental abscess, particularly  posterior right mandibular molars, as discussed. Echo TTE read by cardiology  Left Ventricle Normal wall thickness. Mildly dilated left ventricle. The estimated EF is 35 - 40%. Visually measured ejection fraction. Left Atrium Mildly dilated left atrium. Right Ventricle Mildly dilated right ventricle. Mildly reduced systolic function. Right Atrium Mildly dilated right atrium. Aortic Valve Normal valve structure, no stenosis and no regurgitation. Mitral Valve No stenosis. Mitral valve non-specific thickening.  Trace regurgitation. Tricuspid Valve Normal valve structure and no stenosis. Moderate regurgitation. Right Ventricular Arterial Pressure (RVSP) is 85 mmHg. Pulmonary hypertension found to be severe. Pulmonic Valve Pulmonic valve not well visualized. No stenosis. Trace regurgitation. Aorta Normal aortic root, ascending aortic, and aortic arch. Pulmonary Artery Pulmonary arterial systolic pressure (PASP) is 85 mmHg. Pulmonary hypertension found to be moderate to severe. IVC/Hepatic Veins Normal structure. Normal central venous pressure (3 mmHg); IVC diameter is less than 21 mm and collapses more than 50% with respiration. Pericardium Insignificant pericardial effusion or fat. Echo EVER read by cardiology   Left Ventricle Normal cavity size. Mildly increased wall thickness. The estimated EF is 35 - 40%. Moderately reduced systolic function. Left Atrium Mildly dilated left atrium. Right Ventricle Normal cavity size and global systolic function. Right Atrium Mildly dilated right atrium. Interatrial Septum No PFO noted on color Doppler. Aortic Valve No stenosis and no regurgitation. Aortic valve sclerosis. Mitral Valve No stenosis. Leaflet calcification. Mild mitral annular calcification. Mild regurgitation. There is likely a ruputured cord off of one of the papillary muscles. The cord appears calcified. There is no significant mitral regurgitation. Tricuspid Valve Normal valve structure and no stenosis. Moderate regurgitation. Pulmonic Valve Normal valve structure, no stenosis and no regurgitation. Aorta There is  moderate plaque observed in the transverse aorta and descending aorta. Pulmonary Artery Pulmonary hypertension found to be moderate. Pericardium No evidence of pericardial effusion. Hospital course:       Streptococcus intermedius bacteremia 4/4 bottles POA  Sepsis POA WBC 12.3, , RR 22-30, fever 103, normal lactate  Multiple dental caries POA ?  Source of bacteremia  - Similar presentation in April 2021 with persistent bacteremia Staph epidermitis          AICD removed. TTE showed mitral valve thickening.  - Presented with generalized body ache, nausea, vomiting.  - Admit BC with Strep intermedius  - CT abdomen/pelvisno acute abnormality.  - Chest x-rayNo focal airspace process. - Echo TTE LVEF 35-40%, PA Pressure 85, reduced RV function  - Echo EVER did not show vegetations, LVEF 45-50%, mod pulm HTN  - Tagged WBC Scan did not show abnormal uptake. - US chest did not show localized fluid collection.   - Duplex of HD AV graft did not show evidence of infection. - CT Maxillofacial r evealed possible periapical dental abscesses in multiple teeth. - S/P IV rocephin and vancomycin  - Repeat Bcx 11/7 with no growth  - ID, vascular surgery evaluated, vascular surgery felt AVG is not infected, s/p fistulogram 11/15/2021  - antibiotics weaned to vancomycin, will get at HD to complete 4 weeks       Will have to go to outpatient HD while antibiotics  - Renal to do HD tonight, will start  - CM set an outpatient oral surgery follow up in AM  - plan discharge in AM    End-stage renal disease on dialysis POA  - Follows up with Dr Selin Gu[facility] at [facility]  Nephrology following on TTS schedule. Continue with the PhosLo and calcitriol. Acute on chronic anemia  - S/p 1 unit of PRBC. - HgB 8.7     History of DVT with graft thrombosis  -Continue Coumadin. Pharmacy managing warfarin. INR is 1.7. 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 Code Status: Full code  DVT Prophylaxis: Coumadin  Baseline: Ambulatory at home       Subjective:     Patient was seen and examined. No acute events overnight.   \"no problems  Ready to go home  Loose stools  No new complaints  Discussed with RN overnight events. Review of Systems:  Symptom Y/N Comments  Symptom Y/N Comments   Fever/Chills n   Chest Pain n    Poor Appetite    Edema     Cough n   Abdominal Pain n    Sputum    Joint Pain     SOB/ZAMUDIO n   Pruritis/Rash     Nausea/vomit n   Tolerating PT/OT     Diarrhea    Tolerating Diet y    Constipation    Other       Could NOT obtain due to:        Objective:     VITALS:   Last 24hrs VS reviewed since prior progress note. Most recent are:  Patient Vitals for the past 24 hrs:   Temp Pulse Resp BP SpO2   11/18/21 1210  89  127/78    11/18/21 1112 98 °F (36.7 °C) 86 15 133/82    11/18/21 1100  86 15 131/81    11/18/21 1045  85 15 130/74    11/18/21 1030  75 15 105/66    11/18/21 1015  77 15 128/70    11/18/21 1000  68 15 129/71    11/18/21 0945  80 15 135/84    11/18/21 0930  79 15 121/73    11/18/21 0915  81 15 110/72    11/18/21 0900  79 15 116/69    11/18/21 0845  82 15 116/74    11/18/21 0830  84 15 124/72    11/18/21 0815  84 15 132/87    11/18/21 0811 98.3 °F (36.8 °C) 86 15 130/88    11/18/21 0750 98.6 °F (37 °C) 84 14 127/81 96 %   11/18/21 0347 98.7 °F (37.1 °C) 76 16 126/83 97 %   11/17/21 2329 98.4 °F (36.9 °C) 91 17 127/73 96 %   11/17/21 2002 98.3 °F (36.8 °C) 85 16 114/81 97 %   11/17/21 1448 98.4 °F (36.9 °C) 88 17 121/75 97 %       Intake/Output Summary (Last 24 hours) at 11/18/2021 1347  Last data filed at 11/18/2021 1112  Gross per 24 hour   Intake 120 ml   Output 2000 ml   Net -1880 ml        PHYSICAL EXAM:  General: WD, WN. Alert, cooperative, no acute distress    EENT:  EOMI. Anicteric sclerae. MMM  Resp:  CTA bilaterally, no wheezing or rales. No accessory muscle use  CV:  Regular  rhythm,  normal S1/S2, no murmurs rubs gallops, No edema  GI:  Soft, Non distended, Non tender. +Bowel sounds  Neurologic:  Alert and oriented X 3, normal speech,   Psych:   Good insight. Not anxious nor agitated  Skin:  No rashes.   No jaundice    Reviewed most current lab test results and cultures  YES  Reviewed most current radiology test results   YES  Review and summation of old records today    NO  Reviewed patient's current orders and MAR    YES  PMH/SH reviewed - no change compared to H&P  ________________________________________________________________________  Care Plan discussed with:    Comments   Patient x    Family      RN x    Care Manager x    Consultant                       x Multidiciplinary team rounds were held today with , nursing, pharmacist and clinical coordinator. Patient's plan of care was discussed; medications were reviewed and discharge planning was addressed. ________________________________________________________________________      Comments   >50% of visit spent in counseling and coordination of care x    ________________________________________________________________________  Michael Sue MD     Procedures: see electronic medical records for all procedures/Xrays and details which were not copied into this note but were reviewed prior to creation of Plan. LABS:  I reviewed today's most current labs and imaging studies.   Pertinent labs include:  Recent Labs     11/18/21  0817 11/16/21  0947   WBC 16.4* 16.6*   HGB 7.7* 8.7*   HCT 24.3* 27.9*    208     Recent Labs     11/18/21  0817 11/17/21  0555 11/16/21  0947 11/16/21  0302   *  --  135*  --    K 5.3*  --  5.5*  --    CL 99  --  98  --    CO2 28  --  27  --    GLU 80  --  168*  --    BUN 51*  --  49*  --    CREA 8.33*  --  10.00*  --    CA 9.3  --  9.5  --    PHOS 3.5  --  4.3  --    ALB 2.6*  --  2.6*  --    TBILI 0.4  --   --   --    ALT 12  --   --   --    INR 1.7* 2.0*  --  1.8*       Signed: Michael Sue MD

## 2021-11-18 NOTE — PROGRESS NOTES
Pharmacist Daily Dosing of Warfarin    Indication & Goal INR: AFib, INR Goal 2-3   has pacemaker, Hx DVT and graft thromboses, Hx HIT    PTA Warfarin Dose: 2.5 mg daily    Notable concurrent conditions and medications:     Labs:  Recent Labs     11/18/21  0817 11/17/21  0555 11/16/21  0947 11/16/21  0947 11/16/21  0302   INR 1.7* 2.0*  --   --  1.8*   HGB 7.7*  --    < > 8.7*  --      --   --  208  --    TBILI 0.4  --   --   --   --    ALB 2.6*  --    < > 2.6*  --     < > = values in this interval not displayed. Impression/Plan:   Warfarin 4 mg booster dose this afternoon. Daily INR ordered  CBC w/o differential every other day has been ordered     Pharmacy will follow daily and adjust the dose as appropriate.     Thank you,  Jonathan Russell, PHARMD

## 2021-11-18 NOTE — PROGRESS NOTES
HD TRANSFER - OUT REPORT:    Verbal report given to MANUELITO Pascal on Yuki Watts being transferred to [facility] for routine progression of care       Report consisted of patient's Situation, Background, Assessment and   Recommendations(SBAR). Information from the following report(s) SBAR, Procedure Summary, Intake/Output, MAR and Recent Results was reviewed with the receiving nurse. Method:  $$ Method: Hemodialysis (11/18/21 1034)    Fluid Removed  NET Fluid Removed (mL): 2000 ml (11/18/21 1112)     Patient response to treatment:  Stable    End Time  Hemodialysis End Time: 9490 (11/18/21 1112)  If not documented, dialysis nurse to update post-dialysis row in HD/Filtration flowsheet     Medications /Volume expansion agents or Fluid boluses administered during treatment? no    Post-dialysis medication administration due?  yes  Remind nurse to administer post-HD medication upon return to unit. Fistula hemostasis? yes    Line heparinization? no    Lines: SUZANNE AVG    Opportunity for questions and clarification was provided.       Patient transported with: Block[employer]

## 2021-11-18 NOTE — PROGRESS NOTES
Patient is ready for d/c from CM standpoint    Transition of Care Plan:     RUR:16%  Disposition:Home  Follow up appointments:PCP, oral surgeon-Dr Rola Reddy, [phone]- scheduled for Dec 23rd @10:30am  DME needed:none  Transportation at [address] or means to access home:        IM Medicare Letter:2nd IM provided  Is patient a BCPI-A Bundle:                      If yes, was Bundle Letter given?:     Caregiver Contact:sisterRaven [phone]  Discharge Caregiver contacted prior to discharge? Due to HD today, appointment w/oral surgeon has been rescheduled for Dec 23rd @10:30am.  CM attempted to schedule a sooner appointment with another surgeon in the practice, but, unfortunately, there was nothing sooner. Patient is d/c home today without any concerns. PCP office will call pt to schedule hospital f/u appointment.       Care Management Interventions  PCP Verified by CM:  (Sep 2021)  Mode of Transport at Discharge: Self  Transition of Care Consult (CM Consult): Discharge Planning  Discharge Durable Medical Equipment: No  Physical Therapy Consult: No  Occupational Therapy Consult: No  Speech Therapy Consult: No  Support Systems: Other Family Member(s)  Confirm Follow Up Transport: Self  Discharge Location  Discharge Placement: Home with outpatient services (HD w/IV antibiotics)      Diania Leak  Ext [phone]

## 2021-11-27 ENCOUNTER — APPOINTMENT (OUTPATIENT)
Dept: GENERAL RADIOLOGY | Age: 46
DRG: 207 | End: 2021-11-27
Attending: EMERGENCY MEDICINE
Payer: MEDICARE

## 2021-11-27 ENCOUNTER — HOSPITAL ENCOUNTER (INPATIENT)
Age: 46
LOS: 27 days | Discharge: HOME HEALTH | DRG: 207 | End: 2021-12-24
Attending: EMERGENCY MEDICINE | Admitting: INTERNAL MEDICINE
Payer: MEDICARE

## 2021-11-27 ENCOUNTER — APPOINTMENT (OUTPATIENT)
Dept: CT IMAGING | Age: 46
DRG: 207 | End: 2021-11-27
Attending: EMERGENCY MEDICINE
Payer: MEDICARE

## 2021-11-27 DIAGNOSIS — R74.01 TRANSAMINITIS: ICD-10-CM

## 2021-11-27 DIAGNOSIS — E87.20 LACTIC ACIDOSIS: ICD-10-CM

## 2021-11-27 DIAGNOSIS — M54.50 CHRONIC LOW BACK PAIN, UNSPECIFIED BACK PAIN LATERALITY, UNSPECIFIED WHETHER SCIATICA PRESENT: ICD-10-CM

## 2021-11-27 DIAGNOSIS — G89.29 CHRONIC LOW BACK PAIN, UNSPECIFIED BACK PAIN LATERALITY, UNSPECIFIED WHETHER SCIATICA PRESENT: ICD-10-CM

## 2021-11-27 DIAGNOSIS — R14.0 ABDOMINAL DISTENSION: ICD-10-CM

## 2021-11-27 DIAGNOSIS — U07.1 COVID-19: Primary | ICD-10-CM

## 2021-11-27 LAB
ALBUMIN SERPL-MCNC: 3.6 G/DL (ref 3.5–5)
ALBUMIN/GLOB SERPL: 0.7 {RATIO} (ref 1.1–2.2)
ALP SERPL-CCNC: 228 U/L (ref 45–117)
ALT SERPL-CCNC: 321 U/L (ref 12–78)
ANION GAP SERPL CALC-SCNC: 15 MMOL/L (ref 5–15)
AST SERPL-CCNC: 697 U/L (ref 15–37)
BASOPHILS # BLD: 0.1 K/UL (ref 0–0.1)
BASOPHILS NFR BLD: 1 % (ref 0–1)
BILIRUB SERPL-MCNC: 0.7 MG/DL (ref 0.2–1)
BUN SERPL-MCNC: 28 MG/DL (ref 6–20)
BUN/CREAT SERPL: 4 (ref 12–20)
CALCIUM SERPL-MCNC: 8.3 MG/DL (ref 8.5–10.1)
CHLORIDE SERPL-SCNC: 94 MMOL/L (ref 97–108)
CO2 SERPL-SCNC: 25 MMOL/L (ref 21–32)
COVID-19 RAPID TEST, COVR: DETECTED
CREAT SERPL-MCNC: 6.53 MG/DL (ref 0.7–1.3)
CRP SERPL-MCNC: 9.91 MG/DL (ref 0–0.6)
DIFFERENTIAL METHOD BLD: ABNORMAL
EOSINOPHIL # BLD: 0.4 K/UL (ref 0–0.4)
EOSINOPHIL NFR BLD: 6 % (ref 0–7)
ERYTHROCYTE [DISTWIDTH] IN BLOOD BY AUTOMATED COUNT: 18.9 % (ref 11.5–14.5)
FERRITIN SERPL-MCNC: 9604 NG/ML (ref 26–388)
FIBRINOGEN PPP-MCNC: 257 MG/DL (ref 200–475)
GLOBULIN SER CALC-MCNC: 5.1 G/DL (ref 2–4)
GLUCOSE SERPL-MCNC: 60 MG/DL (ref 65–100)
HCT VFR BLD AUTO: 23.9 % (ref 36.6–50.3)
HGB BLD-MCNC: 7.6 G/DL (ref 12.1–17)
IMM GRANULOCYTES # BLD AUTO: 0.1 K/UL (ref 0–0.04)
IMM GRANULOCYTES NFR BLD AUTO: 1 % (ref 0–0.5)
INR PPP: 1.7 (ref 0.9–1.1)
LACTATE BLD-SCNC: 5.33 MMOL/L (ref 0.4–2)
LACTATE BLD-SCNC: 6.08 MMOL/L (ref 0.4–2)
LACTATE BLD-SCNC: 6.35 MMOL/L (ref 0.4–2)
LDH SERPL L TO P-CCNC: 909 U/L (ref 85–241)
LYMPHOCYTES # BLD: 1.3 K/UL (ref 0.8–3.5)
LYMPHOCYTES NFR BLD: 18 % (ref 12–49)
MCH RBC QN AUTO: 30.4 PG (ref 26–34)
MCHC RBC AUTO-ENTMCNC: 31.8 G/DL (ref 30–36.5)
MCV RBC AUTO: 95.6 FL (ref 80–99)
MONOCYTES # BLD: 0.6 K/UL (ref 0–1)
MONOCYTES NFR BLD: 9 % (ref 5–13)
NEUTS SEG # BLD: 4.7 K/UL (ref 1.8–8)
NEUTS SEG NFR BLD: 65 % (ref 32–75)
NRBC # BLD: 0.1 K/UL (ref 0–0.01)
NRBC BLD-RTO: 1.4 PER 100 WBC
PLATELET # BLD AUTO: 84 K/UL (ref 150–400)
PLATELET COMMENTS,PCOM: ABNORMAL
POTASSIUM SERPL-SCNC: 4.4 MMOL/L (ref 3.5–5.1)
PROCALCITONIN SERPL-MCNC: 26.76 NG/ML
PROT SERPL-MCNC: 8.7 G/DL (ref 6.4–8.2)
PROTHROMBIN TIME: 16.9 SEC (ref 9–11.1)
RBC # BLD AUTO: 2.5 M/UL (ref 4.1–5.7)
RBC MORPH BLD: ABNORMAL
RBC MORPH BLD: ABNORMAL
SODIUM SERPL-SCNC: 134 MMOL/L (ref 136–145)
SOURCE, COVRS: ABNORMAL
TROPONIN-HIGH SENSITIVITY: 519 NG/L (ref 0–76)
WBC # BLD AUTO: 7.2 K/UL (ref 4.1–11.1)

## 2021-11-27 PROCEDURE — 74177 CT ABD & PELVIS W/CONTRAST: CPT

## 2021-11-27 PROCEDURE — 85610 PROTHROMBIN TIME: CPT

## 2021-11-27 PROCEDURE — 74011250637 HC RX REV CODE- 250/637: Performed by: INTERNAL MEDICINE

## 2021-11-27 PROCEDURE — 65660000001 HC RM ICU INTERMED STEPDOWN

## 2021-11-27 PROCEDURE — 87040 BLOOD CULTURE FOR BACTERIA: CPT

## 2021-11-27 PROCEDURE — 74011000258 HC RX REV CODE- 258: Performed by: EMERGENCY MEDICINE

## 2021-11-27 PROCEDURE — 99285 EMERGENCY DEPT VISIT HI MDM: CPT

## 2021-11-27 PROCEDURE — 83605 ASSAY OF LACTIC ACID: CPT

## 2021-11-27 PROCEDURE — 82728 ASSAY OF FERRITIN: CPT

## 2021-11-27 PROCEDURE — 74011250636 HC RX REV CODE- 250/636: Performed by: INTERNAL MEDICINE

## 2021-11-27 PROCEDURE — 74011250636 HC RX REV CODE- 250/636: Performed by: EMERGENCY MEDICINE

## 2021-11-27 PROCEDURE — 71045 X-RAY EXAM CHEST 1 VIEW: CPT

## 2021-11-27 PROCEDURE — 96375 TX/PRO/DX INJ NEW DRUG ADDON: CPT

## 2021-11-27 PROCEDURE — 87635 SARS-COV-2 COVID-19 AMP PRB: CPT

## 2021-11-27 PROCEDURE — 36415 COLL VENOUS BLD VENIPUNCTURE: CPT

## 2021-11-27 PROCEDURE — 84484 ASSAY OF TROPONIN QUANT: CPT

## 2021-11-27 PROCEDURE — 80053 COMPREHEN METABOLIC PANEL: CPT

## 2021-11-27 PROCEDURE — 85025 COMPLETE CBC W/AUTO DIFF WBC: CPT

## 2021-11-27 PROCEDURE — 74011000636 HC RX REV CODE- 636: Performed by: EMERGENCY MEDICINE

## 2021-11-27 PROCEDURE — 93005 ELECTROCARDIOGRAM TRACING: CPT

## 2021-11-27 PROCEDURE — 83615 LACTATE (LD) (LDH) ENZYME: CPT

## 2021-11-27 PROCEDURE — 96367 TX/PROPH/DG ADDL SEQ IV INF: CPT

## 2021-11-27 PROCEDURE — 96361 HYDRATE IV INFUSION ADD-ON: CPT

## 2021-11-27 PROCEDURE — 96365 THER/PROPH/DIAG IV INF INIT: CPT

## 2021-11-27 PROCEDURE — 86140 C-REACTIVE PROTEIN: CPT

## 2021-11-27 PROCEDURE — 85384 FIBRINOGEN ACTIVITY: CPT

## 2021-11-27 PROCEDURE — 84145 PROCALCITONIN (PCT): CPT

## 2021-11-27 RX ORDER — LEVOFLOXACIN 5 MG/ML
750 INJECTION, SOLUTION INTRAVENOUS ONCE
Status: COMPLETED | OUTPATIENT
Start: 2021-11-27 | End: 2021-11-27

## 2021-11-27 RX ORDER — DIPHENHYDRAMINE HYDROCHLORIDE 50 MG/ML
25 INJECTION, SOLUTION INTRAMUSCULAR; INTRAVENOUS
Status: COMPLETED | OUTPATIENT
Start: 2021-11-27 | End: 2021-11-27

## 2021-11-27 RX ORDER — CALCIUM ACETATE 667 MG/1
2 CAPSULE ORAL
Status: DISCONTINUED | OUTPATIENT
Start: 2021-11-28 | End: 2021-12-07

## 2021-11-27 RX ORDER — SODIUM CHLORIDE 0.9 % (FLUSH) 0.9 %
5-40 SYRINGE (ML) INJECTION EVERY 8 HOURS
Status: DISCONTINUED | OUTPATIENT
Start: 2021-11-27 | End: 2021-12-24 | Stop reason: HOSPADM

## 2021-11-27 RX ORDER — FENTANYL CITRATE 50 UG/ML
50 INJECTION, SOLUTION INTRAMUSCULAR; INTRAVENOUS
Status: COMPLETED | OUTPATIENT
Start: 2021-11-27 | End: 2021-11-27

## 2021-11-27 RX ORDER — PANTOPRAZOLE SODIUM 40 MG/1
40 TABLET, DELAYED RELEASE ORAL
Status: DISCONTINUED | OUTPATIENT
Start: 2021-11-28 | End: 2021-12-06

## 2021-11-27 RX ORDER — ONDANSETRON 2 MG/ML
4 INJECTION INTRAMUSCULAR; INTRAVENOUS
Status: DISCONTINUED | OUTPATIENT
Start: 2021-11-27 | End: 2021-11-27 | Stop reason: SDUPTHER

## 2021-11-27 RX ORDER — WARFARIN 2.5 MG/1
2.5 TABLET ORAL DAILY
Status: DISCONTINUED | OUTPATIENT
Start: 2021-11-28 | End: 2021-11-29

## 2021-11-27 RX ORDER — LEVOFLOXACIN 5 MG/ML
500 INJECTION, SOLUTION INTRAVENOUS
Status: DISCONTINUED | OUTPATIENT
Start: 2021-11-29 | End: 2021-11-27

## 2021-11-27 RX ORDER — GUAIFENESIN 100 MG/5ML
81 LIQUID (ML) ORAL DAILY
Status: DISCONTINUED | OUTPATIENT
Start: 2021-11-28 | End: 2021-12-08

## 2021-11-27 RX ORDER — OXYCODONE HYDROCHLORIDE 5 MG/1
5 TABLET ORAL
Status: DISCONTINUED | OUTPATIENT
Start: 2021-11-27 | End: 2021-12-07

## 2021-11-27 RX ORDER — SODIUM CHLORIDE 9 MG/ML
150 INJECTION, SOLUTION INTRAVENOUS ONCE
Status: COMPLETED | OUTPATIENT
Start: 2021-11-27 | End: 2021-11-27

## 2021-11-27 RX ORDER — ONDANSETRON 4 MG/1
4 TABLET, ORALLY DISINTEGRATING ORAL
Status: DISCONTINUED | OUTPATIENT
Start: 2021-11-27 | End: 2021-12-07

## 2021-11-27 RX ORDER — ACETAMINOPHEN 325 MG/1
650 TABLET ORAL
Status: DISCONTINUED | OUTPATIENT
Start: 2021-11-27 | End: 2021-12-24 | Stop reason: HOSPADM

## 2021-11-27 RX ORDER — DEXAMETHASONE 4 MG/1
6 TABLET ORAL DAILY
Status: DISCONTINUED | OUTPATIENT
Start: 2021-11-27 | End: 2021-12-03

## 2021-11-27 RX ORDER — ZINC SULFATE 50(220)MG
1 CAPSULE ORAL DAILY
Status: DISCONTINUED | OUTPATIENT
Start: 2021-11-28 | End: 2021-12-03

## 2021-11-27 RX ORDER — GUAIFENESIN 600 MG/1
600 TABLET, EXTENDED RELEASE ORAL EVERY 12 HOURS
Status: DISCONTINUED | OUTPATIENT
Start: 2021-11-27 | End: 2021-12-06

## 2021-11-27 RX ORDER — CALCITRIOL 0.25 UG/1
0.5 CAPSULE ORAL DAILY
Status: DISCONTINUED | OUTPATIENT
Start: 2021-11-28 | End: 2021-12-07

## 2021-11-27 RX ORDER — ONDANSETRON 2 MG/ML
4 INJECTION INTRAMUSCULAR; INTRAVENOUS
Status: DISCONTINUED | OUTPATIENT
Start: 2021-11-27 | End: 2021-12-19

## 2021-11-27 RX ORDER — FENTANYL CITRATE 50 UG/ML
50 INJECTION, SOLUTION INTRAMUSCULAR; INTRAVENOUS
Status: COMPLETED | OUTPATIENT
Start: 2021-11-27 | End: 2021-11-28

## 2021-11-27 RX ORDER — SODIUM CHLORIDE 0.9 % (FLUSH) 0.9 %
5-10 SYRINGE (ML) INJECTION AS NEEDED
Status: DISCONTINUED | OUTPATIENT
Start: 2021-11-27 | End: 2021-12-10 | Stop reason: SDUPTHER

## 2021-11-27 RX ORDER — ASCORBIC ACID 500 MG
500 TABLET ORAL DAILY
Status: DISCONTINUED | OUTPATIENT
Start: 2021-11-28 | End: 2021-12-06

## 2021-11-27 RX ORDER — ONDANSETRON 2 MG/ML
4 INJECTION INTRAMUSCULAR; INTRAVENOUS ONCE
Status: COMPLETED | OUTPATIENT
Start: 2021-11-27 | End: 2021-11-27

## 2021-11-27 RX ORDER — ALBUTEROL SULFATE 90 UG/1
2 AEROSOL, METERED RESPIRATORY (INHALATION)
Status: DISCONTINUED | OUTPATIENT
Start: 2021-11-27 | End: 2021-12-07

## 2021-11-27 RX ORDER — POLYETHYLENE GLYCOL 3350 17 G/17G
17 POWDER, FOR SOLUTION ORAL DAILY PRN
Status: DISCONTINUED | OUTPATIENT
Start: 2021-11-27 | End: 2021-12-24 | Stop reason: HOSPADM

## 2021-11-27 RX ORDER — SODIUM CHLORIDE 0.9 % (FLUSH) 0.9 %
5-40 SYRINGE (ML) INJECTION AS NEEDED
Status: DISCONTINUED | OUTPATIENT
Start: 2021-11-27 | End: 2021-12-24 | Stop reason: HOSPADM

## 2021-11-27 RX ADMIN — DEXAMETHASONE 6 MG: 4 TABLET ORAL at 23:17

## 2021-11-27 RX ADMIN — SODIUM CHLORIDE 150 ML/HR: 9 INJECTION, SOLUTION INTRAVENOUS at 20:24

## 2021-11-27 RX ADMIN — SODIUM CHLORIDE 500 ML: 900 INJECTION, SOLUTION INTRAVENOUS at 17:40

## 2021-11-27 RX ADMIN — DIPHENHYDRAMINE HYDROCHLORIDE 25 MG: 50 INJECTION, SOLUTION INTRAMUSCULAR; INTRAVENOUS at 16:57

## 2021-11-27 RX ADMIN — LEVOFLOXACIN 750 MG: 5 INJECTION, SOLUTION INTRAVENOUS at 17:23

## 2021-11-27 RX ADMIN — Medication 10 ML: at 23:17

## 2021-11-27 RX ADMIN — CEFEPIME HYDROCHLORIDE 2 G: 2 INJECTION, POWDER, FOR SOLUTION INTRAVENOUS at 16:51

## 2021-11-27 RX ADMIN — METHYLPREDNISOLONE SODIUM SUCCINATE 125 MG: 125 INJECTION, POWDER, FOR SOLUTION INTRAMUSCULAR; INTRAVENOUS at 16:59

## 2021-11-27 RX ADMIN — ONDANSETRON 4 MG: 2 INJECTION INTRAMUSCULAR; INTRAVENOUS at 16:54

## 2021-11-27 RX ADMIN — SODIUM CHLORIDE 500 ML: 9 INJECTION, SOLUTION INTRAVENOUS at 15:51

## 2021-11-27 RX ADMIN — IOPAMIDOL 100 ML: 755 INJECTION, SOLUTION INTRAVENOUS at 18:26

## 2021-11-27 RX ADMIN — GUAIFENESIN 600 MG: 600 TABLET, EXTENDED RELEASE ORAL at 23:17

## 2021-11-27 RX ADMIN — FENTANYL CITRATE 50 MCG: 50 INJECTION INTRAMUSCULAR; INTRAVENOUS at 16:51

## 2021-11-27 RX ADMIN — FENTANYL CITRATE 50 MCG: 50 INJECTION INTRAMUSCULAR; INTRAVENOUS at 20:23

## 2021-11-27 RX ADMIN — ONDANSETRON 4 MG: 2 INJECTION INTRAMUSCULAR; INTRAVENOUS at 20:23

## 2021-11-27 NOTE — ED NOTES
Delayed blood work and medications d/t pt being a difficult stick. 3 unsuccessful IV attempts by 3 staff members.  Will attempt to find someone who can use US

## 2021-11-27 NOTE — PROGRESS NOTES
Renal Dosing/Monitoring  Medication: cefepime  Current regimen:  2g every 8 hr  Recent Labs     11/27/21  1520   CREA 6.53*   BUN 28*     Estimated CrCl:  ~10-15 ml/min (on dialysis)  Plan: Change to cefepime 1g every 24 hr per P&T Committee Protocol with respect to renal function. Pharmacy will continue to monitor patient daily and will make dosage adjustments based upon changing renal function.

## 2021-11-27 NOTE — PROGRESS NOTES
Renal Dosing/Monitoring  Medication: levofloxacin  Current regimen:  750 every 24 hr  Recent Labs     11/27/21  1520   CREA 6.53*   BUN 28*     Estimated CrCl:  ~10-15 ml/min (on dialysis)  Plan: Change to 750 mg x 1, then 500 mg every 48 hr per P&T Committee Protocol with respect to renal function. Pharmacy will continue to monitor patient daily and will make dosage adjustments based upon changing renal function.

## 2021-11-27 NOTE — ED PROVIDER NOTES
EMERGENCY DEPARTMENT HISTORY AND PHYSICAL EXAM      Date: 11/27/2021  Patient Name: Cresencio Turpin    History of Presenting Illness     Chief Complaint   Patient presents with    Fever     pt developed a fever and body aches on Wednesday. This morning he is weak, mostly in his legs, and this has caused him to fall about 3 times this morning    Generalized Body Aches    Fatigue       History Provided By: Patient    HPI: Cresencio Turpin, 42 y.o. male  presents to the ED with cc of fever, myalgias, fatigue. Patient does have a history of end-stage renal disease on dialysis. Recently diagnosed with bacteremia. Receives vancomycin at dialysis. Patient states fever starting 3 days ago. He has been having diffuse body aches and has abdominal pain. He does have diarrhea but associates this to antibiotics. He states he has become weak and he has fallen 3 times due to weakness. No respiratory complaints. He is not vaccinated for COVID-19. Dialyzed yesterday. 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 Past History     Past Medical History:  Past Medical History:   Diagnosis Date    Abdominal hematoma     AICD (automatic cardioverter/defibrillator) present     CAD (coronary artery disease)     anterior MI s/p 2 stents  2007    Chronic abdominal pain     Chronic kidney disease     ESRD; Dialysis dependent.  Home [facility] does labs- [facility] tpke    DVT (deep venous thrombosis) ([facility] [address] 2001    DVT of popliteal vein ([address] 11/2011    not anticoagulated due to bleeding, IVC filter    Endocarditis     Gallstone pancreatitis     Gastrointestinal disorder     acid reflux    Gastrointestinal disorder     peptic ulcer    Hemodialysis patient ([address]     High cholesterol     Hypertension     Kidney transplant     b/l kidneys 1995, 2001    Long term current use of anticoagulant therapy     Nephrotic syndrome     Other ill-defined conditions(799.89)     kidny transplant x2,  on dialysis    Other ill-defined conditions(799.89)     home dialysis    Other ill-defined conditions(799.89)     hx recurrent left leg DVT    Peritonitis ([facility] [address]     Seizures ([facility] [address] 2015    most recent- only had three in lifetime    Small bowel obstruction (HCC)     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    [facility])        Past Surgical History:  Past Surgical History:   Procedure Laterality Date    HX APPENDECTOMY      HX CHOLECYSTECTOMY      HX OTHER SURGICAL  11/2011    exploratory laparotomy    HX OTHER SURGICAL      fem-pop    HX OTHER SURGICAL  02/2013    right upper extremity fistula    HX PACEMAKER Left 6/2009    AICD-st lat[place]a-not connected    HX PACEMAKER Left     AICD-left side-BS-working    HX SMALL BOWEL RESECTION      HX TRANSPLANT  2001     Kidney    HX TRANSPLANT  1992    kidney    HX VASCULAR ACCESS Right     femoral access for HD- does 5 day dialysis @ home    IR REMOVE TUNL CVAD W/O PORT / PUMP  10/29/2020    IR REPLACE CVC TUNNELED W/O PORT  9/10/2020    [place] CARDIAC SURG PROCEDURE UNLIST  2007    2 stents    [place] EDG US EXAM SURGICAL ALTER STOM DUODENUM/JEJUNUM  7/15/2011         [place] ESOPHAGOGASTRODUODENOSCOPY TRANSORAL DIAGNOSTIC  5/24/2012         VASCULAR SURGERY PROCEDURE UNLIST  11/14/2017    Insertion AV graft right upper arm (bovine); ligation of AV fistula right arm       Medications:  No current facility-administered medications on file prior to encounter. Current Outpatient Medications on File Prior to Encounter   Medication Sig Dispense Refill    vancomycin in 0.9% sodium chloride (VANCOCIN) 1 gram/200 mL pgbk 150 mL by IntraVENous route every Monday, Wednesday, Friday for 8 doses. After hemodialysis treatment 6000 mg 0    ondansetron (Zofran ODT) 4 mg disintegrating tablet 1 Tab by SubLINGual route every eight (8) hours as needed for Nausea or Vomiting. 20 Tab 0    metoprolol succinate (TOPROL-XL) 25 mg XL tablet Take 0.5 Tabs by mouth daily.  30 Tab 0    calcium acetate,phosphat bind, (PHOSLO) 667 mg cap Take 2 Caps by mouth three (3) times daily (with meals). Indications: low amount of calcium in the blood, renal osteodystrophy with hyperphosphatemia 120 Cap 0    atorvastatin (Lipitor) 20 mg tablet Take 20 mg by mouth daily.  warfarin (COUMADIN) 2.5 mg tablet Take 2.5 mg by mouth daily.  acetaminophen (TYLENOL) 500 mg tablet Take 1 Tab by mouth every four (4) hours as needed for Pain. Over the counter 30 Tab 0    omeprazole (PRILOSEC) 20 mg capsule Take 20 mg by mouth daily.  calcitRIOL (ROCALTROL) 0.5 mcg capsule Take 0.5 mcg by mouth daily.  aspirin 81 mg chewable tablet Take 81 mg by mouth daily. Family History:  Family History   Problem Relation Age of Onset    COPD Mother     Lung Disease Mother     Cancer Father         stomach    Cancer Maternal Grandmother        Social History:  Social History     Tobacco Use    Smoking status: Never Smoker    Smokeless tobacco: Never Used   Substance Use Topics    Alcohol use: No    Drug use: Yes     Types: Prescription, OTC       Allergies: Allergies   Allergen Reactions    Shellfish Containing Products Swelling     Break out in rash, trouble breathing    Contrast Agent [Iodine] Shortness of Breath    Heparin Analogues Other (comments)     Positive history of HIT       All the above components of the past  history are auto-populated from the electronic record. They have been reviewed and the patient has been interviewed for any pertinent past history that pertains to the patient's chief complaint and reason for visit. Not all pre-populated components may be accurate at the time this note was generated. Review of Systems   Review of Systems   Constitutional: Positive for chills, fatigue and fever. HENT: Negative for congestion, ear pain, rhinorrhea, sore throat and trouble swallowing. Eyes: Negative for visual disturbance.    Respiratory: Negative for cough, chest tightness and shortness of breath. Cardiovascular: Negative for chest pain and palpitations. Gastrointestinal: Positive for abdominal pain and diarrhea. Negative for blood in stool, constipation, nausea and vomiting. Genitourinary: Negative for decreased urine volume, difficulty urinating, dysuria and frequency. Musculoskeletal: Positive for myalgias. Negative for back pain and neck pain. Skin: Negative for color change and rash. Neurological: Positive for weakness. Negative for dizziness, light-headedness and headaches. Physical Exam   Physical Exam  Vitals and nursing note reviewed. Constitutional:       General: He is not in acute distress. Appearance: He is well-developed. He is not ill-appearing. HENT:      Head: Normocephalic. Eyes:      Conjunctiva/sclera: Conjunctivae normal.   Cardiovascular:      Rate and Rhythm: Normal rate and regular rhythm. Pulmonary:      Effort: Pulmonary effort is normal. No accessory muscle usage or respiratory distress. Abdominal:      General: There is no distension. Musculoskeletal:      Cervical back: Normal range of motion. Skin:     General: Skin is warm and dry. Neurological:      Mental Status: He is alert and oriented to person, place, and time. Due to the COVID-19 pandemic, in order to reduce the spread and transmission of the virus, some basic elements of the physical exam have been deferred to reduce direct or close contact with the patient unless they are deemed to be absolutely necessary, regardless of whether the virus is highly suspected or not.       Diagnostic Study Results     Labs -     Recent Results (from the past 24 hour(s))   EKG, 12 LEAD, INITIAL    Collection Time: 11/27/21 12:47 PM   Result Value Ref Range    Ventricular Rate 69 BPM    Atrial Rate 69 BPM    P-R Interval 178 ms    QRS Duration 100 ms    Q-T Interval 572 ms    QTC Calculation (Bezet) 612 ms    Calculated P Axis 77 degrees    Calculated R Axis 66 degrees Calculated T Axis 177 degrees    Diagnosis       Normal sinus rhythm  Cannot rule out Inferior infarct , age undetermined  T wave abnormality, consider lateral ischemia  Prolonged QT  When compared with ECG of 29-MAR-2021 04:13,  T wave inversion more evident in Lateral leads  QT has lengthened     COVID-19 RAPID TEST    Collection Time: 11/27/21  1:01 PM   Result Value Ref Range    Specimen source Nasopharyngeal      COVID-19 rapid test Detected (AA) NOTD     CBC WITH AUTOMATED DIFF    Collection Time: 11/27/21  2:05 PM   Result Value Ref Range    WBC 7.2 4.1 - 11.1 K/uL    RBC 2.50 (L) 4.10 - 5.70 M/uL    HGB 7.6 (L) 12.1 - 17.0 g/dL    HCT 23.9 (L) 36.6 - 50.3 %    MCV 95.6 80.0 - 99.0 FL    MCH 30.4 26.0 - 34.0 PG    MCHC 31.8 30.0 - 36.5 g/dL    RDW 18.9 (H) 11.5 - 14.5 %    PLATELET 84 (L) 274 - 400 K/uL    NRBC 1.4 (H) 0  WBC    ABSOLUTE NRBC 0.10 (H) 0.00 - 0.01 K/uL    NEUTROPHILS 65 32 - 75 %    LYMPHOCYTES 18 12 - 49 %    MONOCYTES 9 5 - 13 %    EOSINOPHILS 6 0 - 7 %    BASOPHILS 1 0 - 1 %    IMMATURE GRANULOCYTES 1 (H) 0.0 - 0.5 %    ABS. NEUTROPHILS 4.7 1.8 - 8.0 K/UL    ABS. LYMPHOCYTES 1.3 0.8 - 3.5 K/UL    ABS. MONOCYTES 0.6 0.0 - 1.0 K/UL    ABS. EOSINOPHILS 0.4 0.0 - 0.4 K/UL    ABS. BASOPHILS 0.1 0.0 - 0.1 K/UL    ABS. IMM.  GRANS. 0.1 (H) 0.00 - 0.04 K/UL    DF SMEAR SCANNED      PLATELET COMMENTS Large Platelets      RBC COMMENTS ANISOCYTOSIS  1+        RBC COMMENTS TARGET CELLS  PRESENT       PROTHROMBIN TIME + INR    Collection Time: 11/27/21  3:14 PM   Result Value Ref Range    INR 1.7 (H) 0.9 - 1.1      Prothrombin time 16.9 (H) 9.0 - 11.1 sec   PROCALCITONIN    Collection Time: 11/27/21  3:14 PM   Result Value Ref Range    Procalcitonin 26.76 ng/mL   C REACTIVE PROTEIN, QT    Collection Time: 11/27/21  3:14 PM   Result Value Ref Range    C-Reactive protein 9.91 (H) 0.00 - 0.60 mg/dL   FERRITIN    Collection Time: 11/27/21  3:14 PM   Result Value Ref Range    Ferritin 9,604 (H) 26 - 388 NG/ML   FIBRINOGEN    Collection Time: 11/27/21  3:14 PM   Result Value Ref Range    Fibrinogen 257 200 - 876 mg/dL   METABOLIC PANEL, COMPREHENSIVE    Collection Time: 11/27/21  3:20 PM   Result Value Ref Range    Sodium 134 (L) 136 - 145 mmol/L    Potassium 4.4 3.5 - 5.1 mmol/L    Chloride 94 (L) 97 - 108 mmol/L    CO2 25 21 - 32 mmol/L    Anion gap 15 5 - 15 mmol/L    Glucose 60 (L) 65 - 100 mg/dL    BUN 28 (H) 6 - 20 MG/DL    Creatinine 6.53 (H) 0.70 - 1.30 MG/DL    BUN/Creatinine ratio 4 (L) 12 - 20      GFR est AA 11 (L) >60 ml/min/1.73m2    GFR est non-AA 9 (L) >60 ml/min/1.73m2    Calcium 8.3 (L) 8.5 - 10.1 MG/DL    Bilirubin, total 0.7 0.2 - 1.0 MG/DL    ALT (SGPT) 321 (H) 12 - 78 U/L    AST (SGOT) 697 (H) 15 - 37 U/L    Alk.  phosphatase 228 (H) 45 - 117 U/L    Protein, total 8.7 (H) 6.4 - 8.2 g/dL    Albumin 3.6 3.5 - 5.0 g/dL    Globulin 5.1 (H) 2.0 - 4.0 g/dL    A-G Ratio 0.7 (L) 1.1 - 2.2     TROPONIN-HIGH SENSITIVITY    Collection Time: 11/27/21  3:20 PM   Result Value Ref Range    Troponin-High Sensitivity 519 (HH) 0 - 76 ng/L   LD    Collection Time: 11/27/21  3:20 PM   Result Value Ref Range     (H) 85 - 241 U/L   POC LACTIC ACID    Collection Time: 11/27/21  3:35 PM   Result Value Ref Range    Lactic Acid (POC) 6.08 (HH) 0.40 - 2.00 mmol/L   POC LACTIC ACID    Collection Time: 11/27/21  5:25 PM   Result Value Ref Range    Lactic Acid (POC) 6.35 (HH) 0.40 - 2.00 mmol/L   POC LACTIC ACID    Collection Time: 11/27/21  7:28 PM   Result Value Ref Range    Lactic Acid (POC) 5.33 (HH) 0.40 - 0.24 mmol/L   METABOLIC PANEL, BASIC    Collection Time: 11/28/21  1:40 AM   Result Value Ref Range    Sodium 130 (L) 136 - 145 mmol/L    Potassium 5.5 (H) 3.5 - 5.1 mmol/L    Chloride 93 (L) 97 - 108 mmol/L    CO2 19 (L) 21 - 32 mmol/L    Anion gap 18 (H) 5 - 15 mmol/L    Glucose 85 65 - 100 mg/dL    BUN 34 (H) 6 - 20 MG/DL    Creatinine 6.62 (H) 0.70 - 1.30 MG/DL    BUN/Creatinine ratio 5 (L) 12 - 20 GFR est AA 11 (L) >60 ml/min/1.73m2    GFR est non-AA 9 (L) >60 ml/min/1.73m2    Calcium 8.4 (L) 8.5 - 10.1 MG/DL   CBC W/O DIFF    Collection Time: 11/28/21  1:40 AM   Result Value Ref Range    WBC 5.7 4.1 - 11.1 K/uL    RBC 2.79 (L) 4.10 - 5.70 M/uL    HGB 8.4 (L) 12.1 - 17.0 g/dL    HCT 27.1 (L) 36.6 - 50.3 %    MCV 97.1 80.0 - 99.0 FL    MCH 30.1 26.0 - 34.0 PG    MCHC 31.0 30.0 - 36.5 g/dL    RDW 18.8 (H) 11.5 - 14.5 %    PLATELET 92 (L) 528 - 400 K/uL    MPV 12.3 8.9 - 12.9 FL    NRBC 3.7 (H) 0  WBC    ABSOLUTE NRBC 0.21 (H) 0.00 - 0.01 K/uL   D DIMER    Collection Time: 11/28/21  1:40 AM   Result Value Ref Range    D-dimer 3.15 (H) 0.00 - 0.65 mg/L FEU   PROTHROMBIN TIME + INR    Collection Time: 11/28/21  1:40 AM   Result Value Ref Range    INR 1.7 (H) 0.9 - 1.1      Prothrombin time 17.2 (H) 9.0 - 11.1 sec   LACTIC ACID    Collection Time: 11/28/21  1:40 AM   Result Value Ref Range    Lactic acid 8.5 (HH) 0.4 - 2.0 MMOL/L       Radiologic Studies -   CT ABD PELV W CONT   Final Result   1. Relatively small right pleural effusion and minimal left pleural effusion,   increased since 11/5/2021.   2. No acute findings in the abdomen or pelvis. .    3. Renal atrophy and calcified transplant kidneys. 4. Hepatomegaly. No focal lesion. 5. Small amount of ascites. Anasarca. XR CHEST PORT   Final Result      No acute process. CT Results  (Last 48 hours)               11/27/21 1826  CT ABD PELV W CONT Final result    Impression:  1. Relatively small right pleural effusion and minimal left pleural effusion,   increased since 11/5/2021.   2. No acute findings in the abdomen or pelvis. .    3. Renal atrophy and calcified transplant kidneys. 4. Hepatomegaly. No focal lesion. 5. Small amount of ascites. Anasarca.                Narrative:  EXAMINATION:  CT ABD PELV W CONT   INDICATION:  Patient is on dialysis, severely elevated lactate, abdominal pain,   transaminitis   COMPARISON: 11/5/2021. CONTRAST: 100 mL of Isovue-370. TECHNIQUE:    Multislice helical CT was performed from the diaphragm to the symphysis pubis   during uneventful rapid bolus intravenous contrast administration. Oral contrast   was not administered. Axial images were acquired. Lung and soft tissue windows   were generated. Coronal and sagittal reformations were generated. CT dose   reduction was achieved through use of a standardized protocol tailored for this   examination and automatic exposure control for dose modulation. FINDINGS:   LOWER CHEST: Small to moderate right pleural effusion, increased since the   previous study. Minimal left pleural effusion, also increased since the previous   study. LIVER: Diffuse enlargement. Heterogeneous enhancement which is likely due to the   patient's reduced cardiac output. No focal lesion. GALLBLADDER: Previous cholecystectomy. No biliary dilatation. SPLEEN: No mass. Normal size. PANCREAS: No mass or ductal dilatation. ADRENALS: Unremarkable. KIDNEYS: Marked atrophy of the native kidneys which demonstrate extensive   vascular calcification. Calcified transplant kidneys in the iliac fossa   bilaterally. STOMACH: Unremarkable. SMALL BOWEL: No dilatation or wall thickening. COLON: Mildly distended with fluid. Nonobstructive pattern. APPENDIX: Not seen. PERITONEUM: Trace of ascites. No focal fluid collections. RETROPERITONEUM: No lymphadenopathy or aortic aneurysm. REPRODUCTIVE ORGANS: Unremarkable   URINARY BLADDER: Nondistended   PELVIC LYMPH NODES: None enlarged. BONES: Renal osteodystrophy. .   ADDITIONAL COMMENTS:  N/A               CXR Results  (Last 48 hours)               11/27/21 1432  XR CHEST PORT Final result    Impression:      No acute process.            Narrative:  EXAM:  XR CHEST PORT       INDICATION:  Eval for Infiltrate       COMPARISON:  11/5/2021       FINDINGS:       A portable AP radiograph of the chest was obtained at 14:14 hours. There is a   presternal electrode which was previously seen on CT. The lungs are clear. There is unchanged enlargement of the cardiac silhouette but no vascular   congestion or pleural fluid. There is a right axillary stent. Medical Decision Making     I reviewed the vital signs, available nursing notes, past medical history, past surgical history, family history and social history. Vital Signs-I have reviewed the vital signs that have been made available during the patient's emergency department visit. The vital signs auto-populated below are obtained mostly by electronic means through monitoring devices that have been downloaded into the patient's chart by the nursing staff. Some vital signs are not downloaded into the chart until after the patient has been discharged and this note has been completed, therefore some vital signs may not be available to the physician for review prior to patient's discharge or admission. The physician has reviewed the patient's triage vital signs, monitored the electronic monitoring devices remotely for any significant vital sign abnormalities, and have reviewed vital signs prior to discharge. Some vital signs reviewed at bedside or remotely utilizing electronic monitoring devices may be different than the vital signs downloaded into the electronic medical record. Some vital signs may be erroneous and inaccurate since they are obtained by electronic monitoring devices, and not all vital signs are verified for accuracy by nursing staff prior to downloading into the patient's chart.   Patient Vitals for the past 24 hrs:   Temp Pulse Resp BP SpO2   11/28/21 0308 97.4 °F (36.3 °C) 73 19 115/70 100 %   11/27/21 2330 98.4 °F (36.9 °C) 72 22 112/82 95 %   11/27/21 2100 98.6 °F (37 °C) 75 17 104/81 93 %   11/27/21 1936  72 17 112/85    11/27/21 1906  68 17 96/70 100 %   11/27/21 1839    100/73    11/27/21 1809  73 18  92 % 11/27/21 1738     99 %   11/27/21 1730 98.1 °F (36.7 °C) 71 15 (!) 89/70 99 %   11/27/21 1700  69  95/68    11/27/21 1630    (!) 87/63 92 %   11/27/21 1600    97/67 93 %   11/27/21 1553     100 %   11/27/21 1530    (!) 89/64    11/27/21 1338     98 %   11/27/21 1226    95/72    11/27/21 1112 97.5 °F (36.4 °C) 74 20 92/63          Records Reviewed: Nursing notes for today's visit have been reviewed. I have also reviewed most recent medical records pertinent to today's complaints, if available in our medical record system. I have also reviewed all labs and imaging results from previous results in comparison to results obtained today. If an EKG was obtained today, it has been compared to previous EKGs, if available. If arriving via EMS, the EMS report has been reviewed if made available to us within the patient's time in the emergency department. Provider Notes (Medical Decision Making):   Patient with a history of bacteremia presents with fever, myalgias, and fatigue. He has been having abdominal pain and diarrhea. He associates the diarrhea with antibiotics that he receives at dialysis. He is still receiving vancomycin during his dialysis treatments. High suspicion for a return of the bacteremia. He is not febrile or tachycardic but he is mildly hypotensive. Will initiate sepsis work-up. Will administer fluids and small boluses due to history of end-stage renal disease. At this time do not want to administer 30 cc/kg per sepsis protocol due to his ESRD and risk for volume overload. At 2 PM, I signed out this patient to Dr. Caty Paul. No labs or imaging has yet to be obtained. There is been difficulty with IV access and nursing is currently awaiting for staff member to start a ultrasound-guided IV peripherally. Although mildly hypotensive the patient's vital signs are stable and does not need emergent central line placement.  We will continue to wait for peripheral IV access. Was subsequently found to have signs of a COVID-19 infection, possible con commitment bacterial infection. Broad-spectrum antibiotics were initiated and fluid resuscitation was initiated as well. Patient clinically responded well in the ER was admitted to the hospital for further work-up and management. ED Course:   Initial assessment performed. The patients presenting problems have been discussed, and they are in agreement with the care plan formulated and outlined with them. I have encouraged them to ask questions as they arise throughout their visit. ED Course as of 11/28/21 0620   Sat Nov 27, 2021   1607 Reassessed patient, noted patient's elevated lactate and are giving 500 cc bolus of fluids. Will reassess blood pressure and lactate after this. Also given pain and nausea meds. Given patient's history and lab results, will get CT scan of the abdomen pelvis with IV contrast to assess. Needs to be premedicated. [CC]   [id number] [facility] ED SEPSIS NOTE:     4:37 PM The patient now meets criteria for: Severe Sepsis    Fluid resuscitation with: Despite having hypotension, lactate >4 and/or documented septic shock, the standard fluid resuscitation of 30mL/kg would be detrimental or harmful for the patient because the patient has one or more co-morbidities which place him or her at risk for pulmonary edema, respiratory failure, severe fluid overload and higher mortality. Examples of such co-mobridities include advanced heart  failure symptomatic at rest or with minimal exertion, ESRD, existing pulmonary edema or general fluid overload. Plan is to therefore hold fluids or administer a smaller volume at a slower rate while closely monitoring patient, then reassess. Due to concern for rapidly advancing infection and deterioration of patient's condition, antibiotics are started STAT and cultures ordered. [CC]   8606 Noted no improvement in lactate level, will rebolused with 500 cc bolus of fluid.   Still awaiting CT scan will call to expedite. [CC]   1948 I've performed a sepsis reassessment of the patient's clinical volume status and tissue perfusion at time 7:48 PM   [CC]   2022 Patient's lactate is now downtrending after a second set 500 cc bolus. Blood pressure is the best that is been throughout his stay in the ER. He is awake and alert, his CAT scan shows no source of infection. Broad-spectrum amoxicillin initiated and I will start maintenance fluid and treat his pain. I suspect underlying bacteremia in the setting of severe COVID-19 infection. [CC]      ED Course User Index  [CC] Gloria Mora MD       Orders Placed This Encounter    MECHANICAL PROPHYLAXIS IS CONTRAINDICATED Other, please document Already on Anticoagulation    CULTURE, BLOOD    CULTURE, BLOOD    COVID-19 RAPID TEST    XR CHEST PORT    CT ABD PELV W CONT    CBC WITH AUTOMATED DIFF    PROTHROMBIN TIME + INR    PROCALCITONIN    C REACTIVE PROTEIN, QT    FERRITIN    METABOLIC PANEL, COMPREHENSIVE    TROPONIN-HIGH SENSITIVITY    LD    FIBRINOGEN    LACTIC ACID    METABOLIC PANEL, BASIC    CBC W/O DIFF    C REACTIVE PROTEIN, QT    D DIMER    PROTHROMBIN TIME + INR    LACTIC ACID    ADULT DIET Regular; Low Fat/Low Chol/High Fiber/2 gm Na; Low Potassium (Less than 3000 mg/day); Low Phosphorus (Less than 1000 mg)    VITAL SIGNS - PER UNIT ROUTINE    STRICT I & O    NEUROLOGIC STATUS ASSESSMENT - PER UNIT ROUTINE    NURSING-MISCELLANEOUS: PPE required for any aerosolizing procedure (ie, intubation, bronchoscopy). Surgical mask on patient for any transport outside room. Patient should be single room, closed door with notification of droplet plus isolation. PARRISH Yadav NOTIFY PROVIDER: SPECIFY Notify provider on pt's arrival to floor ONE TIME STAT    OUT OF BED WITH ASSISTANCE    VITAL SIGNS PER UNIT ROUTINE    CARDIAC MONITORING    VITAL SIGNS    ACTIVITY AS TOLERATED W/ASSIST    FULL CODE    IP CONSULT TO NEPHROLOGY    DROPLET PLUS    DROPLET PLUS    DROPLET PLUS    POC LACTIC ACID    POC LACTIC ACID    POC LACTIC ACID    EKG, 12 LEAD, INITIAL    SALINE LOCK IV ONE TIME STAT    sodium chloride (NS) flush 5-10 mL    sodium chloride 0.9 % bolus infusion 500 mL    methylPREDNISolone (PF) (Solu-MEDROL) injection 125 mg    ondansetron (ZOFRAN) injection 4 mg    fentaNYL citrate (PF) injection 50 mcg    diphenhydrAMINE (BENADRYL) injection 25 mg    DISCONTD: cefepime (MAXIPIME) 2 g in 0.9% sodium chloride (MBP/ADV) 100 mL MBP    levoFLOXacin (LEVAQUIN) 750 mg in D5W IVPB    DISCONTD: levoFLOXacin (LEVAQUIN) 500 mg in D5W IVPB    iopamidoL (ISOVUE-370) 76 % injection 100 mL    sodium chloride 0.9 % bolus infusion 500 mL    acetaminophen (TYLENOL) tablet 650 mg    DISCONTD: ondansetron (ZOFRAN) injection 4 mg    fentaNYL citrate (PF) injection 50 mcg    0.9% sodium chloride infusion    L.acidophilus-paracasei-S.thermophil-bifidobacter (RISAQUAD) 8 billion cell capsule    aspirin chewable tablet 81 mg    calcitRIOL (ROCALTROL) capsule 0.5 mcg    calcium acetate(phosphat bind) (PHOSLO) capsule 1,334 mg    pantoprazole (PROTONIX) tablet 40 mg    warfarin (COUMADIN) tablet 2.5 mg    sodium chloride (NS) flush 5-40 mL    sodium chloride (NS) flush 5-40 mL    polyethylene glycol (MIRALAX) packet 17 g    OR Linked Order Group     ondansetron (ZOFRAN ODT) tablet 4 mg     ondansetron (ZOFRAN) injection 4 mg    oxyCODONE IR (ROXICODONE) tablet 5 mg    ascorbic acid (vitamin C) (VITAMIN C) tablet 500 mg    zinc sulfate (ZINCATE) 50 mg zinc (220 mg) capsule 1 Capsule    guaiFENesin ER (MUCINEX) tablet 600 mg    albuterol (PROVENTIL HFA, VENTOLIN HFA, PROAIR HFA) inhaler 2 Puff    dexAMETHasone (DECADRON) tablet 6 mg    Warfarin - Pharmacy to Dose    cefepime (MAXIPIME) 1 g in 0.9% sodium chloride (MBP/ADV) 50 mL MBP    Vancomycin - Pharmacy to dose    sodium chloride 0.9 % bolus infusion 250 mL    fentaNYL citrate (PF) injection 25 mcg    IP CONSULT TO PULMONOLOGY    INITIAL PHYSICIAN ORDER: INPATIENT Stepdown; Yes; 3. Patient receiving treatment that can only be provided in an inpatient setting (further clarification in H&P documentation)    IP CONSULT TO Gabriella [id number] to [id number] [facility]       Procedures      Critical Care Time:   I have spent 35 minutes of critical care time in evaluating and treating this patient. This includes time spent at bedside, time with family and decision makers, documentation, review of labs and imaging, and/or consultation with specialists. It does not include time spent on separately billed procedures. This patient presents with a critical illness or injury that acutely impairs one or more vital organ systems such that there is a high probability of imminent or life threatening deterioration in the patient's condition. This case involved decision making of high complexity to assess, manipulate, and support vital organ system failure and/or to prevent further life threatening deterioration of the patient's condition. Failure to initiate these interventions on an urgent basis would likely result in sudden, clinically significant or life threatening deterioration in the patient's condition. Abnormal findings supporting critical care: Transient hypotension, COVID-19, transaminitis  Interventions to support critical care: Fluid resuscitation, serial assessments  Failure to intervene may result in: Patient shock and or death      Disposition:  Admit Note:  4:10 PM  Pt is being admitted by hospitalist. The results of their tests and reason(s) for their admission have been discussed with pt and/or available family. They convey agreement and understanding for the need to be admitted and for admission diagnosis. Diagnosis     Clinical Impression:   1. COVID-19    2. Transaminitis    3. Lactic acidosis

## 2021-11-28 LAB
ANION GAP SERPL CALC-SCNC: 18 MMOL/L (ref 5–15)
ATRIAL RATE: 69 BPM
BUN SERPL-MCNC: 34 MG/DL (ref 6–20)
BUN/CREAT SERPL: 5 (ref 12–20)
CALCIUM SERPL-MCNC: 8.4 MG/DL (ref 8.5–10.1)
CALCULATED P AXIS, ECG09: 77 DEGREES
CALCULATED R AXIS, ECG10: 66 DEGREES
CALCULATED T AXIS, ECG11: 177 DEGREES
CHLORIDE SERPL-SCNC: 93 MMOL/L (ref 97–108)
CO2 SERPL-SCNC: 19 MMOL/L (ref 21–32)
CREAT SERPL-MCNC: 6.62 MG/DL (ref 0.7–1.3)
CRP SERPL-MCNC: 9.79 MG/DL (ref 0–0.6)
D DIMER PPP FEU-MCNC: 3.15 MG/L FEU (ref 0–0.65)
DIAGNOSIS, 93000: NORMAL
ERYTHROCYTE [DISTWIDTH] IN BLOOD BY AUTOMATED COUNT: 18.8 % (ref 11.5–14.5)
GLUCOSE SERPL-MCNC: 85 MG/DL (ref 65–100)
HCT VFR BLD AUTO: 27.1 % (ref 36.6–50.3)
HGB BLD-MCNC: 8.4 G/DL (ref 12.1–17)
INR PPP: 1.7 (ref 0.9–1.1)
LACTATE SERPL-SCNC: 10.5 MMOL/L (ref 0.4–2)
LACTATE SERPL-SCNC: 11.9 MMOL/L (ref 0.4–2)
LACTATE SERPL-SCNC: 8.5 MMOL/L (ref 0.4–2)
MCH RBC QN AUTO: 30.1 PG (ref 26–34)
MCHC RBC AUTO-ENTMCNC: 31 G/DL (ref 30–36.5)
MCV RBC AUTO: 97.1 FL (ref 80–99)
NRBC # BLD: 0.21 K/UL (ref 0–0.01)
NRBC BLD-RTO: 3.7 PER 100 WBC
P-R INTERVAL, ECG05: 178 MS
PLATELET # BLD AUTO: 92 K/UL (ref 150–400)
PMV BLD AUTO: 12.3 FL (ref 8.9–12.9)
POTASSIUM SERPL-SCNC: 5.5 MMOL/L (ref 3.5–5.1)
PROTHROMBIN TIME: 17.2 SEC (ref 9–11.1)
Q-T INTERVAL, ECG07: 572 MS
QRS DURATION, ECG06: 100 MS
QTC CALCULATION (BEZET), ECG08: 612 MS
RBC # BLD AUTO: 2.79 M/UL (ref 4.1–5.7)
SODIUM SERPL-SCNC: 130 MMOL/L (ref 136–145)
VENTRICULAR RATE, ECG03: 69 BPM
WBC # BLD AUTO: 5.7 K/UL (ref 4.1–11.1)

## 2021-11-28 PROCEDURE — 85610 PROTHROMBIN TIME: CPT

## 2021-11-28 PROCEDURE — 80048 BASIC METABOLIC PNL TOTAL CA: CPT

## 2021-11-28 PROCEDURE — 85027 COMPLETE CBC AUTOMATED: CPT

## 2021-11-28 PROCEDURE — 97535 SELF CARE MNGMENT TRAINING: CPT

## 2021-11-28 PROCEDURE — 74011250636 HC RX REV CODE- 250/636: Performed by: NURSE PRACTITIONER

## 2021-11-28 PROCEDURE — 74011250636 HC RX REV CODE- 250/636: Performed by: INTERNAL MEDICINE

## 2021-11-28 PROCEDURE — 74011000258 HC RX REV CODE- 258: Performed by: INTERNAL MEDICINE

## 2021-11-28 PROCEDURE — 85379 FIBRIN DEGRADATION QUANT: CPT

## 2021-11-28 PROCEDURE — 83605 ASSAY OF LACTIC ACID: CPT

## 2021-11-28 PROCEDURE — 74011250637 HC RX REV CODE- 250/637: Performed by: INTERNAL MEDICINE

## 2021-11-28 PROCEDURE — 74011250636 HC RX REV CODE- 250/636: Performed by: EMERGENCY MEDICINE

## 2021-11-28 PROCEDURE — 86140 C-REACTIVE PROTEIN: CPT

## 2021-11-28 PROCEDURE — 97167 OT EVAL HIGH COMPLEX 60 MIN: CPT

## 2021-11-28 PROCEDURE — 65660000001 HC RM ICU INTERMED STEPDOWN

## 2021-11-28 PROCEDURE — 36415 COLL VENOUS BLD VENIPUNCTURE: CPT

## 2021-11-28 RX ORDER — FENTANYL CITRATE 50 UG/ML
25 INJECTION, SOLUTION INTRAMUSCULAR; INTRAVENOUS
Status: DISCONTINUED | OUTPATIENT
Start: 2021-11-28 | End: 2021-11-29

## 2021-11-28 RX ORDER — FENTANYL CITRATE 50 UG/ML
25 INJECTION, SOLUTION INTRAMUSCULAR; INTRAVENOUS ONCE
Status: COMPLETED | OUTPATIENT
Start: 2021-11-28 | End: 2021-11-28

## 2021-11-28 RX ADMIN — ONDANSETRON 4 MG: 2 INJECTION INTRAMUSCULAR; INTRAVENOUS at 22:22

## 2021-11-28 RX ADMIN — CALCITRIOL CAPSULES 0.25 MCG 0.5 MCG: 0.25 CAPSULE ORAL at 08:50

## 2021-11-28 RX ADMIN — WARFARIN SODIUM 3 MG: 2 TABLET ORAL at 17:39

## 2021-11-28 RX ADMIN — WARFARIN SODIUM 2.5 MG: 2.5 TABLET ORAL at 12:48

## 2021-11-28 RX ADMIN — Medication 1 CAPSULE: at 08:50

## 2021-11-28 RX ADMIN — ASPIRIN 81 MG CHEWABLE TABLET 81 MG: 81 TABLET CHEWABLE at 08:50

## 2021-11-28 RX ADMIN — FENTANYL CITRATE 25 MCG: 0.05 INJECTION, SOLUTION INTRAMUSCULAR; INTRAVENOUS at 17:38

## 2021-11-28 RX ADMIN — Medication 10 ML: at 06:09

## 2021-11-28 RX ADMIN — FENTANYL CITRATE 25 MCG: 50 INJECTION, SOLUTION INTRAMUSCULAR; INTRAVENOUS at 06:09

## 2021-11-28 RX ADMIN — GUAIFENESIN 600 MG: 600 TABLET, EXTENDED RELEASE ORAL at 08:50

## 2021-11-28 RX ADMIN — FENTANYL CITRATE 25 MCG: 0.05 INJECTION, SOLUTION INTRAMUSCULAR; INTRAVENOUS at 08:48

## 2021-11-28 RX ADMIN — ONDANSETRON 4 MG: 2 INJECTION INTRAMUSCULAR; INTRAVENOUS at 14:18

## 2021-11-28 RX ADMIN — SODIUM CHLORIDE 250 ML: 9 INJECTION, SOLUTION INTRAVENOUS at 03:28

## 2021-11-28 RX ADMIN — FENTANYL CITRATE 25 MCG: 0.05 INJECTION, SOLUTION INTRAMUSCULAR; INTRAVENOUS at 13:11

## 2021-11-28 RX ADMIN — FENTANYL CITRATE 25 MCG: 0.05 INJECTION, SOLUTION INTRAMUSCULAR; INTRAVENOUS at 22:22

## 2021-11-28 RX ADMIN — Medication 10 ML: at 22:22

## 2021-11-28 RX ADMIN — PANTOPRAZOLE SODIUM 40 MG: 40 TABLET, DELAYED RELEASE ORAL at 08:50

## 2021-11-28 RX ADMIN — ZINC SULFATE 220 MG (50 MG) CAPSULE 1 CAPSULE: CAPSULE at 08:50

## 2021-11-28 RX ADMIN — Medication 10 ML: at 13:12

## 2021-11-28 RX ADMIN — CALCIUM ACETATE 1334 MG: 667 CAPSULE ORAL at 12:48

## 2021-11-28 RX ADMIN — OXYCODONE HYDROCHLORIDE AND ACETAMINOPHEN 500 MG: 500 TABLET ORAL at 08:50

## 2021-11-28 RX ADMIN — FENTANYL CITRATE 50 MCG: 50 INJECTION INTRAMUSCULAR; INTRAVENOUS at 01:14

## 2021-11-28 RX ADMIN — CALCIUM ACETATE 1334 MG: 667 CAPSULE ORAL at 08:50

## 2021-11-28 RX ADMIN — CEFEPIME HYDROCHLORIDE 1 G: 1 INJECTION, POWDER, FOR SOLUTION INTRAMUSCULAR; INTRAVENOUS at 17:38

## 2021-11-28 RX ADMIN — CALCIUM ACETATE 1334 MG: 667 CAPSULE ORAL at 17:38

## 2021-11-28 RX ADMIN — GUAIFENESIN 600 MG: 600 TABLET, EXTENDED RELEASE ORAL at 22:22

## 2021-11-28 NOTE — PROGRESS NOTES
Pharmacist Daily Dosing of Warfarin    Indication & Goal INR: DVT, INR Goal 2-3    PTA Warfarin Dose: 2.5 mg daily    Notable concurrent conditions and medications: Aspirin    Labs:  Recent Labs     11/28/21  0140 11/27/21  1520 11/27/21  1514 11/27/21  1405 11/27/21  1405   INR 1.7*  --  1.7*  --   --    HGB 8.4*  --   --    < > 7.6*   PLT 92*  --   --   --  84*   TBILI  --  0.7  --   --   --    ALB  --  3.6  --   --   --     < > = values in this interval not displayed. Impression/Plan:   Will order warfarin 3 mg PO x 1 dose. Daily INR has been ordered  CBC w/o differential every other day has been ordered     Pharmacy will follow daily and adjust the dose as appropriate.     Thank you,  MICHELE Rodriguez

## 2021-11-28 NOTE — PROGRESS NOTES
Received notification from bedside RN about patient with regards to: Lactic 8.5, HD patient  VS: /82, HR 72, RR 22, O2 sat 95% On NC 2 L    Intervention given:  ml IV bolus x 1, has order for lactic in 6 hours    0541: reports persistent severe pain, refusing Roxicodone as it is not helping much.   VS: /70, HR 73, RR 19, O2 sat 100% on NC 2 L    - Fentanyl 25 mcg IV x 1 dose ordered

## 2021-11-28 NOTE — PROGRESS NOTES
Pharmacy Antimicrobial Kinetic Dosing    Indication for Antimicrobials: bloodstream infection - hx of recent strep intermedius bacteremia ; Covid-19    Current Regimen of Each Antimicrobial:  Cefepime 1 gm IV q24h (Start Date ; Day # 2)  Vancomycin 750 mg after HD (Start Date ; Day # 2)    Previous Antimicrobial Therapy:  Vancomycin 750 mg Mon, Wed, Fri after HD    Goal Level: VANCOMYCIN TROUGH GOAL RANGE    Vancomycin Trough: 20 - 25 mcg/mL    Date Dose & Interval Measured (mcg/mL) Predicted AUC/ZECHARIAH                       Date & time of next level:     Significant Positive Cultures:    blood: pending    Labs:  Recent Labs     21  0140 21  1520 21  1514   CREA 6.62* 6.53*  --    BUN 34* 28*  --    PCT  --   --  26.76     Recent Labs     21  0140 21  1405   WBC 5.7 7.2     Temp (24hrs), Av.1 °F (36.7 °C), Min:97.4 °F (36.3 °C), Max:98.6 °F (37 °C)    Conditions for Dosing Consideration: ESRD on HD MWF    Impression/Plan:   Continue cefepime 1 gm IV q24h  Pharmacy to order vancomycin 750 mg IV after HD  Antimicrobial stop date TBD     Pharmacy will follow daily and adjust medications as appropriate for renal function and/or serum levels.     Thank you,  MICHELE Fuentes

## 2021-11-28 NOTE — PROGRESS NOTES
Pharmacist Consult    Consult reason:   Baricitinib criteria for use evaluation for utilization in COVID-19, Tocilizumab criteria for use evaluation for utilization in COVID-19, and Remdesivir criteria for use evaluation for utilization in COVID-19    Plan:   Patient does not meet criteria for use of baricitinib or remdesivir due to requiring hemodialysis. Patient does not meet criteria for tocilizumab due to minimal oxygenation requirements.      Thank you,  Shane Albert, PHARMD

## 2021-11-28 NOTE — PROGRESS NOTES
Problem: Airway Clearance - Ineffective  Goal: Achieve or maintain patent airway  Outcome: Progressing Towards Goal     Problem: Gas Exchange - Impaired  Goal: Absence of hypoxia  Outcome: Progressing Towards Goal     Problem: Breathing Pattern - Ineffective  Goal: Ability to achieve and maintain a regular respiratory rate  Outcome: Progressing Towards Goal     Problem:  Body Temperature -  Risk of, Imbalanced  Goal: Ability to maintain a body temperature within defined limits  Outcome: Progressing Towards Goal

## 2021-11-28 NOTE — CONSULTS
Name: Maddy Rivera: [address]   : 1975 Admit Date: 2021   Phone: [phone]  Room:    PCP: Shiela Leonardo MD  MRN: [id number]   Date: 2021  Code: Full Code        HPI:      Chart and notes reviewed. Data reviewed. I review the patient's current medications in the medical record at each encounter.  I have evaluated and examined the patient. 8:40 AM       History was obtained from patient. I was asked by Jessica Dorsey MD to see Carol Eliassabino in consultation for a chief complaint of Covid-19 infection. History of Present Illness:  Mr.Gary will a 41 yo gentleman with a PMH ESRD on HD MWF, hypertension, ischemic cardiomyopathy s/pp ICD placement, and GERD that presented to the ED on  with worsening fatigue and SOB. Rapid covid test positive. He is unvaccinated against Covid-19. He is currently on RA on my exam today with sats at 96%. Feels a little better. Images:      CXR : no acute process    Abdominal CT :  Relatively small right pleural effusion and minimal left pleural effusion, increased since 2021. Rhenda Denise No acute findings in the abdomen or pelvis. .  Renal atrophy and calcified transplant kidneys. Hepatomegaly. No focal lesion. Small amount of ascites. Anasarca. D-dimer 3.15  Lactic acid 8.5  Trop 519  Ferritin 9604  Procalcitonin 26.76  CRP 9.91  Blood cultures negative x 18 hours  ECHO 21: EF 35-40%, mildly dilated LA, mildly dilated RV, mildly reduced systolic function, trave MVR, moderate TVR. Severe pulmonary hypertension RVSP 85mmHg,   Past Medical History:   Diagnosis Date    Abdominal hematoma     AICD (automatic cardioverter/defibrillator) present     CAD (coronary artery disease)     anterior MI s/p 2 stents  2007    Chronic abdominal pain     Chronic kidney disease     ESRD; Dialysis dependent.  Home [facility] does labs- [facility] tpke    DVT [facility]deep venous thrombosis) [facility] [address]     DVT of popliteal vein ([facility] [address] 11/2011    not anticoagulated due to bleeding, IVC filter    Endocarditis     Gallstone pancreatitis     Gastrointestinal disorder     acid reflux    Gastrointestinal disorder     peptic ulcer    Hemodialysis patient ([address]     High cholesterol     Hypertension     Kidney transplant     b/l kidneys 1995, 2001    Long term current use of anticoagulant therapy     Nephrotic syndrome     Other ill-defined conditions(799.89)     kidny transplant x2,  on dialysis    Other ill-defined conditions(799.89)     home dialysis    Other ill-defined conditions(799.89)     hx recurrent left leg DVT    Peritonitis ([address]     Seizures ([address] 2015    most recent- only had three in lifetime    Small bowel obstruction (HCC)     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    [facility])        Past Surgical History:   Procedure Laterality Date    HX APPENDECTOMY      HX CHOLECYSTECTOMY      HX OTHER SURGICAL  11/2011    exploratory laparotomy    HX OTHER SURGICAL      fem-pop    HX OTHER SURGICAL  02/2013    right upper extremity fistula    HX PACEMAKER Left 6/2009    AICD-st latonya-not connected    HX PACEMAKER Left     AICD-left side-BS-working    HX SMALL BOWEL RESECTION      HX TRANSPLANT  2001     Kidney    HX TRANSPLANT  1992    kidney    HX VASCULAR ACCESS Right     femoral access for HD- does 5 day dialysis @ home    IR REMOVE TUNL CVAD W/O PORT / PUMP  10/29/2020    IR REPLACE CVC TUNNELED W/O PORT  9/10/2020    TN CARDIAC SURG PROCEDURE UNLIST  2007    2 stents    TN EDG US EXAM SURGICAL ALTER STOM DUODENUM/JEJUNUM  7/15/2011         TN ESOPHAGOGASTRODUODENOSCOPY TRANSORAL DIAGNOSTIC  5/24/2012         VASCULAR SURGERY PROCEDURE UNLIST  11/14/2017    Insertion AV graft right upper arm (bovine); ligation of AV fistula right arm       Family History   Problem Relation Age of Onset    COPD Mother     Lung Disease Mother     Cancer Father         stomach    Cancer Maternal Grandmother        Social History     Tobacco Use    Smoking status: Never Smoker    Smokeless tobacco: Never Used   Substance Use Topics    Alcohol use: No       Allergies   Allergen Reactions    Shellfish Containing Products Swelling     Break out in rash, trouble breathing    Contrast Agent [Iodine] Shortness of Breath    Heparin Analogues Other (comments)     Positive history of HIT       Current Facility-Administered Medications   Medication Dose Route Frequency    fentaNYL citrate (PF) injection 25 mcg  25 mcg IntraVENous Q4H PRN    sodium chloride (NS) flush 5-10 mL  5-10 mL IntraVENous PRN    acetaminophen (TYLENOL) tablet 650 mg  650 mg Oral Q6H PRN    L.acidophilus-paracasei-S.thermophil-bifidobacter (RISAQUAD) 8 billion cell capsule  1 Capsule Oral DAILY    aspirin chewable tablet 81 mg  81 mg Oral DAILY    calcitRIOL (ROCALTROL) capsule 0.5 mcg  0.5 mcg Oral DAILY    calcium acetate(phosphat bind) (PHOSLO) capsule 1,334 mg  2 Capsule Oral TID WITH MEALS    pantoprazole (PROTONIX) tablet 40 mg  40 mg Oral ACB    warfarin (COUMADIN) tablet 2.5 mg  2.5 mg Oral DAILY    sodium chloride (NS) flush 5-40 mL  5-40 mL IntraVENous Q8H    sodium chloride (NS) flush 5-40 mL  5-40 mL IntraVENous PRN    polyethylene glycol (MIRALAX) packet 17 g  17 g Oral DAILY PRN    ondansetron (ZOFRAN ODT) tablet 4 mg  4 mg Oral Q8H PRN    Or    ondansetron (ZOFRAN) injection 4 mg  4 mg IntraVENous Q6H PRN    oxyCODONE IR (ROXICODONE) tablet 5 mg  5 mg Oral Q4H PRN    ascorbic acid (vitamin C) (VITAMIN C) tablet 500 mg  500 mg Oral DAILY    zinc sulfate (ZINCATE) 50 mg zinc (220 mg) capsule 1 Capsule  1 Capsule Oral DAILY    guaiFENesin ER (MUCINEX) tablet 600 mg  600 mg Oral Q12H    albuterol (PROVENTIL HFA, VENTOLIN HFA, PROAIR HFA) inhaler 2 Puff  2 Puff Inhalation Q6H PRN    dexAMETHasone (DECADRON) tablet 6 mg  6 mg Oral DAILY    Warfarin - Pharmacy to Dose   Other Rx Dosing/Monitoring    cefepime (MAXIPIME) 1 g in 0.9% sodium chloride (MBP/ADV) 50 mL MBP  1 g IntraVENous Q24H    Vancomycin - Pharmacy to dose   Other Rx Dosing/Monitoring         REVIEW OF SYSTEMS   Negative except as stated in the HPI. Physical Exam:   Visit Vitals  /70 (BP 1 Location: Left upper arm, BP Patient Position: At rest)   Pulse 73   Temp 97.4 °F (36.3 °C)   Resp 19   Ht 5' 7\" (1.702 m)   Wt 64 kg (141 lb 1.5 oz)   SpO2 100%   BMI 22.10 kg/m²       General:  Alert, cooperative, no distress, appears stated age. Head:  Normocephalic, without obvious abnormality, atraumatic. Eyes:  Conjunctivae/corneas clear. Nose: Nares normal. Septum midline. Mucosa normal.    Throat: Lips, mucosa, and tongue normal. Teeth and gums normal.   Neck: Supple, symmetrical, trachea midline. Lungs:   Clear to auscultation bilaterally. Chest wall:  No tenderness or deformity. Heart:  Regular rate and rhythm, S1, S2 normal, no murmur, click, rub or gallop. Abdomen:   Soft, non-tender. Bowel sounds normal. No masses,  No organomegaly. Extremities: Extremities normal, atraumatic, no cyanosis or edema. Pulses: 2+ and symmetric all extremities.    Skin: Skin color, texture, turgor normal. No rashes or lesions   Lymph nodes: Cervical, supraclavicular, and axillary nodes normal.   Neurologic: Grossly nonfocal       Lab Results   Component Value Date/Time    Sodium 130 (L) 11/28/2021 01:40 AM    Potassium 5.5 (H) 11/28/2021 01:40 AM    Chloride 93 (L) 11/28/2021 01:40 AM    CO2 19 (L) 11/28/2021 01:40 AM    BUN 34 (H) 11/28/2021 01:40 AM    Creatinine 6.62 (H) 11/28/2021 01:40 AM    Glucose 85 11/28/2021 01:40 AM    Calcium 8.4 (L) 11/28/2021 01:40 AM    Magnesium 2.2 11/11/2021 05:40 AM    Phosphorus 3.5 11/18/2021 08:17 AM    Lactic acid 8.5 (HH) 11/28/2021 01:40 AM       Lab Results   Component Value Date/Time    WBC 5.7 11/28/2021 01:40 AM    HGB 8.4 (L) 11/28/2021 01:40 AM    PLATELET 92 (L) 11/28/2021 01:40 AM    MCV 97.1 11/28/2021 01:40 AM       Lab Results   Component Value Date/Time    INR 1.7 (H) 11/28/2021 01:40 AM    aPTT 47.0 (H) 11/05/2021 04:34 PM    Alk.  phosphatase 228 (H) 11/27/2021 03:20 PM    Protein, total 8.7 (H) 11/27/2021 03:20 PM    Albumin 3.6 11/27/2021 03:20 PM    Globulin 5.1 (H) 11/27/2021 03:20 PM       Lab Results   Component Value Date/Time    Iron 57 11/22/2018 05:27 AM    TIBC 234 (L) 11/22/2018 05:27 AM    Iron % saturation 24 11/22/2018 05:27 AM    Ferritin 9,604 (H) 11/27/2021 03:14 PM       Lab Results   Component Value Date/Time    C-Reactive protein 9.91 (H) 11/27/2021 03:14 PM    TSH 1.28 09/26/2014 08:32 PM        No results found for: PH, PHI, PCO2, PCO2I, PO2, PO2I, HCO3, HCO3I, FIO2, FIO2I    Lab Results   Component Value Date/Time     01/16/2019 07:09 AM    CK-MB Index 2.0 01/16/2019 07:09 AM    Troponin-I, Qt. <0.05 03/29/2021 05:06 AM    BNP 1,946 (H) 09/16/2014 04:17 AM        Lab Results   Component Value Date/Time    Culture result: NO GROWTH AFTER 18 HOURS 11/27/2021 02:05 PM    Culture result: NO GROWTH AFTER 18 HOURS 11/27/2021 01:40 PM    Culture result: NO GROWTH 5 DAYS 11/07/2021 09:50 AM       Lab Results   Component Value Date/Time    Hepatitis B surface Ag 0.39 11/11/2021 03:15 PM       Lab Results   Component Value Date/Time    Vancomycin,trough 17.7 (H) 12/08/2011 02:59 AM    Vancomycin,trough 19.5 (H) 12/02/2011 02:40 AM     01/16/2019 07:09 AM    CK 62 05/20/2016 06:34 AM       Lab Results   Component Value Date/Time    Color RED 11/16/2012 05:15 PM    Appearance OPAQUE 11/16/2012 05:15 PM    Specific gravity 1.020 11/16/2012 05:15 PM    pH (UA) 8.0 11/16/2012 05:15 PM    Protein >300 (A) 11/16/2012 05:15 PM    Glucose 100 (A) 11/16/2012 05:15 PM    Ketone NEGATIVE  11/16/2012 05:15 PM    Bilirubin NEGATIVE  09/17/2012 01:35 AM    Blood MODERATE (A) 11/16/2012 05:15 PM    Urobilinogen 0.2 11/16/2012 05:15 PM    Nitrites NEGATIVE 11/16/2012 05:15 PM    Leukocyte Esterase NEGATIVE  11/16/2012 05:15 PM    WBC >100 (H) 11/16/2012 05:15 PM    RBC >100 (H) 11/16/2012 05:15 PM    Bacteria 3+ (A) 11/16/2012 05:15 PM       IMPRESSION  · Acute Respiratory Failure with Hypoxia  · Covid-19; CXR currently clear  · ESRD  · CM s/p ICD placement    PLAN  · Supplemental oxygen to keep sats >90%  · Continue Dexamethasone; low threshold to increase if he worsens  · Start Baricitinib if oxygen requirements increase  · On Cefepime  · Renal following  · Coumadin dosed per pharmacy  · Trend inflammatory markers  · Encourage IS use, proning  · At high risk for decompensation given his comorbidities, would continue to monitor closely    Thanks for the consult.       Radha Landaverde NP

## 2021-11-28 NOTE — PROGRESS NOTES
Hospitalist Progress Note    NAME: Favio Lemus   :  1975   MRN:  [id number]       Assessment / Plan:  Acute Covid 19 infection syndrome POA-onset of symptoms past Wednesday 3 days ago per patient, not vaccinated  Mild hypoxic respiratory failure POA requiring only 2 L/m of oxygen in ED  History of recent Streptococcus intermedius bacteremia for which patient was started on IV vancomycin with dialysis on discharge on 2021  WBC 7.2  Platelet 84 (due to viral syndrome)  Lactate 6.08-> 6.35-> 5.33  INR 1.7  Fibrinogen 257  LD= 909  LFTs 321/697/228 (due to viral syndrome)  Ferritin 1904  CRP 9.91  Rapid Covid test positive  Blood culture pending     Admit to stepdown unit as patient at high risk for further deterioration due to immunocompromise state having high inflammatory markers with positive rapid COVID-19 test   Oxygen to keep sats are than 92%, taper to off as able  P.o. Decadron 6 mg daily for 10 days  Mucinex twice daily  Vitamin C, zinc  IV antibiotics -IV vancomycin per recent discharge plan for bacteremia last admission + empiric IV cefepime to cover gram-negative sepsis due to severe lactic acidosis.   F/u cultures, if negative, will DC cefepime  Droplet plus precaution for COVID-19 infection  Continue anticoagulation with Coumadin (patient had a history of DVT with graft thrombosis history)  Inpatient pharmacy consult for advanced COVID-19 therapy if indicated, patient does not qualify for any additional therapies   Lactate slightly worse this morning    Abdominal pain  CT scan reviewed  monitor     ESRD on hemodialysis   Inpatient nephrology consult for dialysis needs     Hypertension  Ischemic cardiomyopathy status post AICD (left chest wall)  GERD  CAD  History of TYLOR  Holding home BP meds due to borderline blood pressure with lactic acidosis  Continue home PPI  Continue home aspirin, holding metoprolol as well follow BP, holding statin due to elevated LFTs  Follow platelet/CBC daily     Chronic pain syndrome  Oxycodone 5 mg q 4hrs as needed  Avoid Tylenol due to elevated LFTs  Fentanyl for 24 hrs only and DC, discussed with the patient     Code Status: Full code  Surrogate Decision Maker: Sister     DVT Prophylaxis: Coumadin as above  GI Prophylaxis: PPI as at home     Baseline: Patient drives himself to dialysis units Monday Wednesday Friday from home, independent with ADLs, lives with sister      18.5 - 24.9 Normal weight / Body mass index is 22.1 kg/m². Estimated discharge date: Nov 29, 2021  Barriers: COVID-19         Subjective:     Chief Complaint / Reason for Physician Visit  Patient seen and examined at the bedside. He complains of severe abdominal pain. He states his last bowel movement was yesterday and was normal.  He states that this is not his usual chronic pain and only fentanyl has worked so far. We did discuss that we will only continue fentanyl for 24 hours and then this will be discontinued. He otherwise denies chest pain or shortness of breath. .  Discussed with RN events overnight. Review of Systems:  Symptom Y/N Comments  Symptom Y/N Comments   Fever/Chills    Chest Pain     Poor Appetite    Edema     Cough    Abdominal Pain     Sputum    Joint Pain     SOB/ZAMUDIO    Pruritis/Rash     Nausea/vomit    Tolerating PT/OT     Diarrhea    Tolerating Diet     Constipation    Other       Could NOT obtain due to:      Objective:     VITALS:   Last 24hrs VS reviewed since prior progress note.  Most recent are:  Patient Vitals for the past 24 hrs:   Temp Pulse Resp BP SpO2   11/28/21 0308 97.4 °F (36.3 °C) 73 19 115/70 100 %   11/27/21 2330 98.4 °F (36.9 °C) 72 22 112/82 95 %   11/27/21 2100 98.6 °F (37 °C) 75 17 104/81 93 %   11/27/21 1936  72 17 112/85    11/27/21 1906  68 17 96/70 100 %   11/27/21 1839    100/73    11/27/21 1809  73 18  92 %   11/27/21 1738     99 %   11/27/21 1730 98.1 °F (36.7 °C) 71 15 (!) 89/70 99 %   11/27/21 1700  69  95/68    11/27/21 1630    (!) 87/63 92 %   11/27/21 1600    97/67 93 %   11/27/21 1553     100 %   11/27/21 1530    (!) 89/64    11/27/21 1338     98 %   11/27/21 1226    95/72    11/27/21 1112 97.5 °F (36.4 °C) 74 20 92/63        Intake/Output Summary (Last 24 hours) at 11/28/2021 0843  Last data filed at 11/27/2021 1853  Gross per 24 hour   Intake 1250 ml   Output    Net 1250 ml        I had a face to face encounter and independently examined this patient on 11/28/2021, as outlined below:  PHYSICAL EXAM:    General:          Alert, cooperative, no distress, appears stated age. HEENT:           Atraumatic, anicteric sclerae, pink conjunctivae                          No oral ulcers, mucosa moist, throat clear, dentition fair  Neck:               Supple, symmetrical,  thyroid: non tender  Lungs:             Clear to auscultation bilaterally. No Wheezing or Rhonchi. No rales. Chest wall:      No tenderness  No Accessory muscle use. Left chest wall AICD noted  Heart:              Regular  rhythm,  No  murmur   No edema  Abdomen:        Soft, mildly-tender. Not distended. Bowel sounds normal  Extremities:     No cyanosis. No clubbing, left arm AV fistula noted with good thrill                          Skin turgor normal, Capillary refill normal, Radial dial pulse 2+  Skin:                Not pale. Not Jaundiced  No rashes   Psych:             Good insight. Not depressed. Not anxious or agitated. Neurologic:      EOMs intact. No facial asymmetry. No aphasia or slurred speech. Symmetrical strength, Sensation grossly intact. Alert and oriented X 4.      Reviewed most current lab test results and cultures  YES  Reviewed most current radiology test results   YES  Review and summation of old records today    NO  Reviewed patient's current orders and MAR    YES  PMH/SH reviewed - no change compared to H&P  ________________________________________________________________________  Care Plan discussed with:    Comments   Patient     Family      RN     Care Manager     Consultant                        Multidiciplinary team rounds were held today with , nursing, pharmacist and clinical coordinator. Patient's plan of care was discussed; medications were reviewed and discharge planning was addressed. ________________________________________________________________________  Total NON critical care TIME:  31   Minutes    Total CRITICAL CARE TIME Spent:   Minutes non procedure based      Comments   >50% of visit spent in counseling and coordination of care     ________________________________________________________________________  Rashi Arellano MD     Procedures: see electronic medical records for all procedures/Xrays and details which were not copied into this note but were reviewed prior to creation of Plan. LABS:  I reviewed today's most current labs and imaging studies.   Pertinent labs include:  Recent Labs     11/28/21 0140 11/27/21  1405   WBC 5.7 7.2   HGB 8.4* 7.6*   HCT 27.1* 23.9*   PLT 92* 84*     Recent Labs     11/28/21  0140 11/27/21  1520 11/27/21  1514   * 134*  --    K 5.5* 4.4  --    CL 93* 94*  --    CO2 19* 25  --    GLU 85 60*  --    BUN 34* 28*  --    CREA 6.62* 6.53*  --    CA 8.4* 8.3*  --    ALB  --  3.6  --    TBILI  --  0.7  --    ALT  --  321*  --    INR 1.7*  --  1.7*       Signed: Rashi Arellano MD

## 2021-11-28 NOTE — PROGRESS NOTES
0700: Bedside shift change report given to Jovita RN (oncoming nurse) by Shanell Whalen RN (offgoing nurse). Report included the following information SBAR, Kardex, ED Summary and Intake/Output. 1748: Notified MD of Lactic level of 10.5. Pt will also probably need some sort of line placed tomorrow and MD is okay with this, said to pass along to night shift and next day shift. 1808: Pt had 5 beats of vtach. MD notified.

## 2021-11-28 NOTE — PROGRESS NOTES
0540: Pt c/o abd pain. Was given fentanyl x2 overnight. Pt requesting fentanyl again but order DC'd after 2 doses. Pt refusing to try PRN Rajwinder that is already ordered. Telling RN to call the doctor to get Fentanyl order. Paged NP. Received 1x dose fentanyl at this time.

## 2021-11-28 NOTE — PROGRESS NOTES
Nephrology Progress Note  Blaise Leblanc     [email or web address]  Phone - [phone]   Patient: Ermelinda Landrum    YOB: 1975        Date- 11/28/2021   Admit Date: 11/27/2021  CC: Follow up for  ESRD          IMPRESSION & PLAN:   · ESRD- home HD 5 days per week- Follows up with Dr Daren Mccauley at [facility] ( now inpatient HD for IV antibiotics.)  · Covid 19 infection  · Hyperkalemia  · Left arm swelling- s/p angioplasty of left subclavian vein  · Gram positive sepsis from dental abcess  · s/p lead extraction at [facility]  · Anemia of ckd  · Hx of renal tx in past times 2.  · Hypertension  · CAD  · Failed RTX times 2         H/o DVT, s/p ivc filter/ h/o HIT      PLAN-   No plans for HD today from volume and lab stand point   Plan for HD tomorrow to keep him on MWF schedule   Antibiotics and steroids per IM for COVID and gram positive sepsis.  Will add EPO for anemia     Subjective: Interval History:   -  Patient was seen and examined. Well-known to me from his previous admissions. He was recently here for gram-positive sepsis from dental abscesses. He is at home evaluation of Dr. Daren Mccauley at [facility]. Because of the requirement for needing IV antibiotics he was changed to in centre dialysis at [facility] with Dr. Daren Mccauley. He presented again with: The fever and chills. Initial evaluation in the ER showed positive COVID-19 test.  Patient admitted for further evaluation. Renal consult was requested for management of hemodialysis.     Objective:   Vitals:    11/27/21 1936 11/27/21 2100 11/27/21 2330 11/28/21 0308   BP: 112/85 104/81 112/82 115/70   Pulse: 72 75 72 73   Resp: 17 17 22 19   Temp:  98.6 °F (37 °C) 98.4 °F (36.9 °C) 97.4 °F (36.3 °C)   SpO2:  93% 95% 100%   Weight:       Height:          11/27 0701 - 11/28 0700  In: 1250 [I.V.:1250]  Out: -   Last 3 Recorded Weights in this Encounter    11/27/21 1112   Weight: 64 kg (141 lb 1.5 oz)      Physical exam:   GEN: NAD  NECK- Supple, no mass  RESP: No wheezing, Clear b/l  CVS: S1,S2  RRR  NEURO: Normal speech, Non focal  EXT: No Edema   PSYCH: Normal Mood    Chart reviewed. Pertinent Notes reviewed.      Data Review :  Recent Labs     11/28/21 0140 11/27/21  1520   * 134*   K 5.5* 4.4   CL 93* 94*   CO2 19* 25   BUN 34* 28*   CREA 6.62* 6.53*   GLU 85 60*   CA 8.4* 8.3*     Recent Labs     11/28/21  0140 11/27/21  1405   WBC 5.7 7.2   HGB 8.4* 7.6*   HCT 27.1* 23.9*   PLT 92* 84*     Recent Labs     11/27/21  1514   FERR 9,604*      Medication list  reviewed  Current Facility-Administered Medications   Medication Dose Route Frequency    fentaNYL citrate (PF) injection 25 mcg  25 mcg IntraVENous Q4H PRN    sodium chloride (NS) flush 5-10 mL  5-10 mL IntraVENous PRN    acetaminophen (TYLENOL) tablet 650 mg  650 mg Oral Q6H PRN    L.acidophilus-paracasei-S.thermophil-bifidobacter (RISAQUAD) 8 billion cell capsule  1 Capsule Oral DAILY    aspirin chewable tablet 81 mg  81 mg Oral DAILY    calcitRIOL (ROCALTROL) capsule 0.5 mcg  0.5 mcg Oral DAILY    calcium acetate(phosphat bind) (PHOSLO) capsule 1,334 mg  2 Capsule Oral TID WITH MEALS    pantoprazole (PROTONIX) tablet 40 mg  40 mg Oral ACB    warfarin (COUMADIN) tablet 2.5 mg  2.5 mg Oral DAILY    sodium chloride (NS) flush 5-40 mL  5-40 mL IntraVENous Q8H    sodium chloride (NS) flush 5-40 mL  5-40 mL IntraVENous PRN    polyethylene glycol (MIRALAX) packet 17 g  17 g Oral DAILY PRN    ondansetron (ZOFRAN ODT) tablet 4 mg  4 mg Oral Q8H PRN    Or    ondansetron (ZOFRAN) injection 4 mg  4 mg IntraVENous Q6H PRN    oxyCODONE IR (ROXICODONE) tablet 5 mg  5 mg Oral Q4H PRN    ascorbic acid (vitamin C) (VITAMIN C) tablet 500 mg  500 mg Oral DAILY    zinc sulfate (ZINCATE) 50 mg zinc (220 mg) capsule 1 Capsule  1 Capsule Oral DAILY    guaiFENesin ER (MUCINEX) tablet 600 mg  600 mg Oral Q12H    albuterol (PROVENTIL HFA, VENTOLIN HFA, PROAIR HFA) inhaler 2 Puff  2 Puff Inhalation Q6H PRN    dexAMETHasone (DECADRON) tablet 6 mg  6 mg Oral DAILY    Warfarin - Pharmacy to Dose   Other Rx Dosing/Monitoring    cefepime (MAXIPIME) 1 g in 0.9% sodium chloride (MBP/ADV) 50 mL MBP  1 g IntraVENous Q24H    Vancomycin - Pharmacy to dose   Other Rx Dosing/Monitoring          Beatrice Varghese, [id number] [facility]  [facility] / [facility]  [address], [address]  [place], [address]  Phone - [phone]               Fax - [phone]

## 2021-11-28 NOTE — PROGRESS NOTES
Problem: Self Care Deficits Care Plan (Adult)  Goal: *Acute Goals and Plan of Care (Insert Text)  Description:   FUNCTIONAL STATUS PRIOR TO ADMISSION: Patient was independent and active without use of DME. Pt reports doing his own home HD.    HOME SUPPORT: The patient lived with sister but did not require assist.    Occupational Therapy Goals  Initiated 11/28/2021  1. Patient will perform grooming standing sinkside with CGA/minimal assist within 7 day(s). 2.  Patient will perform bathing in supported sitting with supervision/set-up within 7 day(s). 3.  Patient will perform lower body dressing with minimal assistance/contact guard assist within 7 day(s). 4.  Patient will perform toilet transfers with minimal assistance/contact guard assist within 7 day(s). 5.  Patient will perform all aspects of toileting with supervision/set-up within 7 day(s). 6.  Patient will participate in upper extremity therapeutic exercise/activities with supervision/set-up for 15 minutes within 7 day(s). 7.  Patient will utilize energy conservation techniques during functional activities with verbal cues within 7 day(s). Outcome: Not Met   OCCUPATIONAL THERAPY EVALUATION  Patient: Rg Phillips (63 y.o. male)  Date: 11/28/2021  Primary Diagnosis: Acute COVID-19 [U07.1]        Precautions:  Contact, Fall, Skin (COVID)    ASSESSMENT  Based on the objective data described below, the patient presents with significant functional decline d/t increased fluid and decreased functional endurance to sustain ADLs and mobility safely. Pt reports difficulty w/ LE movement d/t increased fluid. (Pt normally is on home HD.) Pt able to sit EOB w/ effort. Pt modA for sock don/doffing w/ increased time, rest breaks. Pt only able to sit 3-4 minutes before getting nauseated. Pt reports he had problem at home, but it had not been a factor here because he hasn't been moving. With increased time, pt able to roll and scoot in supine to reposition self. Vitals were stable throughout. Educated on Energy Conservation/Work Simplification Techniques but will need re-instruction and repetition d/t nausea. Pt motivated to regain independence. Current Level of Function Impacting Discharge (ADLs/self-care): modA    Functional Outcome Measure: The patient scored Total: 40/100 on the Barthel Index outcome measure which is indicative of being moderately impaired in basic self-care. Other factors to consider for discharge: currently not ambulating or transferring to chair; pt was independent w/ setting self up for HD at [facility]     Patient will benefit from skilled therapy intervention to address the above noted impairments. PLAN :  Recommendations and Planned Interventions: self care training, functional mobility training, therapeutic exercise, balance training, visual/perceptual training, therapeutic activities, cognitive retraining, endurance activities, neuromuscular re-education, patient education, and home safety training    Frequency/Duration: Patient will be followed by occupational therapy 4 times a week to address goals. Recommendation for discharge: (in order for the patient to meet his/her long term goals)  Therapy 3 hours per day 5-7 days per week d/t PLOF and motivation    This discharge recommendation:  Has not yet been discussed the attending provider and/or case management    IF patient discharges home will need the following DME: To Be Determined (TBD) at next level of care        SUBJECTIVE:   Patient stated I haven't been moving very well because of all the fluid.     OBJECTIVE DATA SUMMARY:   HISTORY:   Past Medical History:   Diagnosis Date    Abdominal hematoma     AICD (automatic cardioverter/defibrillator) present     CAD (coronary artery disease)     anterior MI s/p 2 stents  2007    Chronic abdominal pain     Chronic kidney disease     ESRD; Dialysis dependent.  Home [facility] does labs- mech tpke    DVT (deep venous thrombosis) ([facility] [address] 2001    DVT of popliteal vein ([facility] [address] 11/2011    not anticoagulated due to bleeding, IVC filter    Endocarditis     Gallstone pancreatitis     Gastrointestinal disorder     acid reflux    Gastrointestinal disorder     peptic ulcer    Hemodialysis patient ([facility] [address]     High cholesterol     Hypertension     Kidney transplant     b/l kidneys 1995, 2001    Long term current use of anticoagulant therapy     Nephrotic syndrome     Other ill-defined conditions(799.89)     kidny transplant x2,  on dialysis    Other ill-defined conditions(799.89)     home dialysis    Other ill-defined conditions(799.89)     hx recurrent left leg DVT    Peritonitis ([facility] [address]     Seizures ([facility] [address] 2015    most recent- only had three in lifetime    Small bowel obstruction (HCC)     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    V-tach [facility])      Past Surgical History:   Procedure Laterality Date    HX APPENDECTOMY      HX CHOLECYSTECTOMY      HX OTHER SURGICAL  11/2011    exploratory laparotomy    HX OTHER SURGICAL      fem-pop    HX OTHER SURGICAL  02/2013    right upper extremity fistula    HX PACEMAKER Left 6/2009    AICD-st latonya-not connected    HX PACEMAKER Left     AICD-left side-BS-working    HX SMALL BOWEL RESECTION      HX TRANSPLANT  2001     Kidney    HX TRANSPLANT  1992    kidney    HX VASCULAR ACCESS Right     femoral access for HD- does 5 day dialysis @ home    IR REMOVE TUNL CVAD W/O PORT / PUMP  10/29/2020    IR REPLACE CVC TUNNELED W/O PORT  9/10/2020    LA CARDIAC SURG PROCEDURE UNLIST  2007    2 stents    LA EDG US EXAM SURGICAL ALTER STOM DUODENUM/JEJUNUM  7/15/2011         LA ESOPHAGOGASTRODUODENOSCOPY TRANSORAL DIAGNOSTIC  5/24/2012         VASCULAR SURGERY PROCEDURE UNLIST  11/14/2017    Insertion AV graft right upper arm (bovine); ligation of AV fistula right arm       Expanded or extensive additional review of patient history:     Home Situation  Home Environment: Private residence  # Steps to Enter: 4  Rails to Enter: Yes  Hand Rails : Right  One/Two Story Residence: Two story  # of Interior Steps: 20  Interior Rails: Both  Living Alone: No  Support Systems: Other (Comment) (sibling/sister)  Patient Expects to be Discharged to[de-identified] House  Current DME Used/Available at Home: None  Tub or Shower Type: Tub/Shower combination    Hand dominance: Right    EXAMINATION OF PERFORMANCE DEFICITS:  Cognitive/Behavioral Status:  Neurologic State: Alert; Appropriate for age  Orientation Level: Oriented X4  Cognition: Follows commands; Appropriate for age attention/concentration  Perception: Appears intact  Perseveration: No perseveration noted  Safety/Judgement: Awareness of environment; Insight into deficits    Skin: at risk, dry    Edema: BLE moderate edema    Hearing: Auditory  Auditory Impairment: None    Vision/Perceptual:    Acuity: Able to read employee name badge without difficulty       Range of Motion:  AROM: Generally decreased, functional  PROM: Generally decreased, functional    Strength:  Strength: Generally decreased, functional    Coordination:  Coordination: Within functional limits  Fine Motor Skills-Upper: Left Intact; Right Intact    Gross Motor Skills-Upper: Left Intact; Right Intact    Tone & Sensation:  Tone: Normal  Sensation: Intact    Balance:  Sitting: Intact    Functional Mobility and Transfers for ADLs:  Bed Mobility:  Rolling: Stand-by assistance  Supine to Sit: Contact guard assistance; Additional time (use of bedrails)  Sit to Supine: Contact guard assistance  Scooting: Contact guard assistance (use of bedrails)    ADL Assessment:  Feeding: Modified independent (w/ increased time)    Oral Facial Hygiene/Grooming: Setup (increased time; seated)    Bathing: Moderate assistance    Upper Body Dressing: Minimum assistance    Lower Body Dressing: Moderate assistance    Toileting:  Moderate assistance    ADL Intervention and task modifications:  Feeding  Drink to Mouth: Contact guard assistance    Grooming  Position Performed: Seated edge of bed  Washing Hands: Set-up (with wipes)    Lower Body Dressing Assistance  Socks: Moderate assistance  Position Performed: Seated edge of bed (ankle-over-knee technique & side sitting)    Cognitive Retraining  Problem Solving: Identifying the task; Identifying the problem; General alternative solution  Safety/Judgement: Awareness of environment; Insight into deficits    Functional Measure:    Barthel Index:  Bathin  Bladder: 10  Bowels: 5  Groomin  Dressin  Feeding: 10  Mobility: 0  Stairs: 0  Toilet Use: 5  Transfer (Bed to Chair and Back): 5  Total: 40/100      The Barthel ADL Index: Guidelines  1. The index should be used as a record of what a patient does, not as a record of what a patient could do. 2. The main aim is to establish degree of independence from any help, physical or verbal, however minor and for whatever reason. 3. The need for supervision renders the patient not independent. 4. A patient's performance should be established using the best available evidence. Asking the patient, friends/relatives and nurses are the usual sources, but direct observation and common sense are also important. However direct testing is not needed. 5. Usually the patient's performance over the preceding 24-48 hours is important, but occasionally longer periods will be relevant. 6. Middle categories imply that the patient supplies over 50 per cent of the effort. 7. Use of aids to be independent is allowed. Score Interpretation (from [address] [address])    Independent   60-79 Minimally independent   40-59 Partially dependent   20-39 Very dependent   <20 Totally dependent     -Edyta Harden., Barthel, D.W. (1965). Functional evaluation: the Barthel Index. [address] ([address] (1997). The Barthel activities of daily living index: self-reporting versus actual performance in the old (> or = 75 years).  Journal of American Geriatric Society 45(7), 14 [facility], [place], Rosamaria Colin.Adriana. (). Measuring the change in disability after inpatient rehabilitation; comparison of the responsiveness of the Barthel Index and Functional [place] Measure. Journal of Neurology, Neurosurgery, and Psychiatry, 66(4), 606048. ELEAZAR Oden, GAMAL Payton, & Brian Reno M.A. (2004) Assessment of post-stroke quality of life in cost-effectiveness studies: The usefulness of the Barthel Index and the EuroQoL-5D. Quality of Life Research, 15, 959-60     Occupational Therapy Evaluation Charge Determination   History Examination Decision-Making   HIGH Complexity : Extensive review of history including physical, cognitive and psychosocial history  HIGH Complexity : 5 or more performance deficits relating to physical, cognitive , or psychosocial skils that result in activity limitations and / or participation restrictions HIGH Complexity : Patient presents with comorbidities that affect occupational performance. Signifigant modification of tasks or assistance (eg, physical or verbal) with assessment (s) is necessary to enable patient to complete evaluation       Based on the above components, the patient evaluation is determined to be of the following complexity level: HIGH   Pain Ratin/10 BLE (pressure from edema)    Activity Tolerance:   SpO2 stable on RA, requires frequent rest breaks, and observed SOB with activity    After treatment patient left in no apparent distress:    Supine in bed, Call bell within reach, and RN/Jovita aware of nausea     COMMUNICATION/EDUCATION:   The patients plan of care was discussed with: Registered nurse/Jovita. Home safety education was provided and the patient/caregiver indicated understanding. and Patient/family have participated as able in goal setting and plan of care. This patients plan of care is appropriate for delegation to [facility] [place].     Thank you for this referral.  Maryann Amen  Time Calculation: 40 mins

## 2021-11-28 NOTE — PROGRESS NOTES
Pharmacy Antimicrobial Kinetic Dosing    Indication for Antimicrobials: bloodstream infection - hx of recent strep intermedius bacteremia ; Covid-19    Current Regimen of Each Antimicrobial:  Cefepime 1 gm IV q24h (Start Date ; Day # 1)  Vancomycin 750 mg after HD (Start Date ; Day # 1)    Previous Antimicrobial Therapy:  Vancomycin 750 mg Mon, Wed, Fri after HD    Goal Level: VANCOMYCIN TROUGH GOAL RANGE    Vancomycin Trough: 20 - 25 mcg/mL    Date Dose & Interval Measured (mcg/mL) Predicted AUC/ZECHARIAH                       Date & time of next level:     Significant Positive Cultures:    blood: pending    Labs:  Recent Labs     21  1520 21  1514   CREA 6.53*  --    BUN 28*  --    PCT  --  26.76     Recent Labs     21  1405   WBC 7.2     Temp (24hrs), Av.8 °F (36.6 °C), Min:97.5 °F (36.4 °C), Max:98.1 °F (36.7 °C)    Conditions for Dosing Consideration: ESRD on HD    Impression/Plan:   Continue cefepime 1 gm IV q24h  Pharmacy to order vancomycin 750 mg IV after HD  Antimicrobial stop date TBD     Pharmacy will follow daily and adjust medications as appropriate for renal function and/or serum levels.     Thank you,  José Montez, PHARMD

## 2021-11-28 NOTE — H&P
Hospitalist Admission Note    NAME: Adolph Quevedo   :  1975   MRN:  [id number]     Date/Time:  2021 7:37 PM    Patient PCP: Naresh Grant MD  ______________________________________________________________________  Given the patient's current clinical presentation, I have a high level of concern for decompensation if discharged from the emergency department. Complex decision making was performed, which includes reviewing the patient's available past medical records, laboratory results, and x-ray films. My assessment of this patient's clinical condition and my plan of care is as follows. Assessment / Plan:  Acute Covid 19 infection syndrome POA-onset of symptoms past Wednesday 3 days ago per patient  Mild hypoxic respiratory failure POA requiring only 2 L/m of oxygen in ED  History of recent Streptococcus intermedius bacteremia for which patient was started on IV vancomycin with dialysis on discharge on 2021  WBC 7.2  Platelet 84 (due to viral syndrome)  Lactate 6.08-> 6.35-> 5.33  INR 1.7  Fibrinogen 257  LD= 909  LFTs 321/697/228 (due to viral syndrome)  Ferritin 1904  CRP 9.91  Rapid Covid test positive  Blood culture pending    Admit to stepdown unit as patient at high risk for further deterioration due to immunocompromise state having high inflammatory markers with positive rapid COVID-19 test   Oxygen to keep sats are than 92%, taper to off as able  P.o. Decadron 6 mg daily for 10 days  Mucinex twice daily  Vitamin C, zinc  IV antibiotics -IV vancomycin per recent discharge plan for bacteremia last admission + empiric IV cefepime to cover gram-negative sepsis due to severe lactic acidosis.   Droplet plus precaution for COVID-19 infection  Continue anticoagulation with Coumadin (patient had a history of DVT with graft thrombosis history)  Inpatient pharmacy consult for advanced COVID-19 therapy if indicated-remdesivir versus baricitinib versus Actemra if qualifies per hospital protocol    ESRD on hemodialysis Monday Wednesday Friday  Inpatient nephrology consult for dialysis needs    Hypertension  Ischemic cardiomyopathy status post AICD (left chest wall)  GERD  CAD  History of TYLOR  Holding home BP meds due to borderline blood pressure with lactic acidosis  Continue home PPI  Continue home aspirin, holding metoprolol as well follow BP, holding statin due to elevated LFTs  Follow platelet/CBC daily    Chronic pain syndrome  Oxycodone 5 mg q 4hrs as needed  Avoid Tylenol due to elevated LFTs    Code Status: Full code  Surrogate Decision Maker: Sister    DVT Prophylaxis: Coumadin as above  GI Prophylaxis: PPI as at home    Baseline: Patient drives himself to dialysis units Monday Wednesday Friday from home, independent with ADLs, lives with sister      Subjective:   CHIEF COMPLAINT: Fever with flulike symptoms since Wednesday    HISTORY OF PRESENT ILLNESS:     Devin Mckenzie is a 42 y.o.  male who presents with above complaints from home. Patient present with chief complaint of sudden onset fever with myalgia and extreme fatigue/flulike symptoms that started Wednesday as per patient. Patient has history of end-stage renal disease on dialysis Monday Wednesday Friday, was recently diagnosed with Streptococcus intermedius bacteremia which was thought to be from ? Dental caries as source-was discharged from [facility] [address] on vancomycin with dialysis as outpatient.   Patient is unvaccinated for COVID-19 infection  Patient has history of childhood nephrotic syndrome after which he developed end-stage renal disease and was on immuno suppressants in the past    Patient was found to have rapid Covid test positive with elevated inflammatory markers including CRP, LDH, ferritin, lactic acid with unremarkable chest x-ray and CT abdomen and pelvis on work-up in ED. patient was mildly/borderline hypoxic in the ED requiring 2 L of oxygen via nasal cannula when seen by me in the ED.    We were asked to admit for work up and evaluation of the above problems. Past Medical History:   Diagnosis Date    Abdominal hematoma     AICD (automatic cardioverter/defibrillator) present     CAD (coronary artery disease)     anterior MI s/p 2 stents  2007    Chronic abdominal pain     Chronic kidney disease     ESRD; Dialysis dependent.  Home [facility] does labs- [facility] tpke    DVT (deep venous thrombosis) ([address] 2001    DVT of popliteal vein ([address] 11/2011    not anticoagulated due to bleeding, IVC filter    Endocarditis     Gallstone pancreatitis     Gastrointestinal disorder     acid reflux    Gastrointestinal disorder     peptic ulcer    Hemodialysis patient ([address]     High cholesterol     Hypertension     Kidney transplant     b/l kidneys 1995, 2001    Long term current use of anticoagulant therapy     Nephrotic syndrome     Other ill-defined conditions(799.89)     kidny transplant x2,  on dialysis    Other ill-defined conditions(799.89)     home dialysis    Other ill-defined conditions(799.89)     hx recurrent left leg DVT    Peritonitis ([address]     Seizures ([address] 2015    most recent- only had three in lifetime    Small bowel obstruction (HCC)     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)         Past Surgical History:   Procedure Laterality Date    HX APPENDECTOMY      HX CHOLECYSTECTOMY      HX OTHER SURGICAL  11/2011    exploratory laparotomy    HX OTHER SURGICAL      fem-pop    HX OTHER SURGICAL  02/2013    right upper extremity fistula    HX PACEMAKER Left 6/2009    AICD-st latonya-not connected    HX PACEMAKER Left     AICD-left side-BS-working    HX SMALL BOWEL RESECTION      HX TRANSPLANT  2001     Kidney    HX TRANSPLANT  1992    kidney    HX VASCULAR ACCESS Right     femoral access for HD- does 5 day dialysis @ home    IR REMOVE TUNL CVAD W/O PORT / PUMP  10/29/2020    IR REPLACE CVC TUNNELED W/O PORT  9/10/2020    [place] CARDIAC SURG PROCEDURE UNLIST  2007    2 stents    ND EDG US EXAM SURGICAL ALTER STOM DUODENUM/JEJUNUM  7/15/2011         ND ESOPHAGOGASTRODUODENOSCOPY TRANSORAL DIAGNOSTIC  5/24/2012         VASCULAR SURGERY PROCEDURE UNLIST  11/14/2017    Insertion AV graft right upper arm (bovine); ligation of AV fistula right arm       Social History     Tobacco Use    Smoking status: Never Smoker    Smokeless tobacco: Never Used   Substance Use Topics    Alcohol use: No        Family History   Problem Relation Age of Onset    COPD Mother     Lung Disease Mother     Cancer Father         stomach    Cancer Maternal Grandmother      Allergies   Allergen Reactions    Shellfish Containing Products Swelling     Break out in rash, trouble breathing    Contrast Agent [Iodine] Shortness of Breath    Heparin Analogues Other (comments)     Positive history of HIT        Prior to Admission medications    Medication Sig Start Date End Date Taking? Authorizing Provider   vancomycin in 0.9% sodium chloride (VANCOCIN) 1 gram/200 mL pgbk 150 mL by IntraVENous route every Monday, Wednesday, Friday for 8 doses. After hemodialysis treatment 11/19/21 12/7/21  Jyo Castillo MD   ondansetron (Zofran ODT) 4 mg disintegrating tablet 1 Tab by SubLINGual route every eight (8) hours as needed for Nausea or Vomiting. 3/27/21   Nohemi Anderson MD   metoprolol succinate (TOPROL-XL) 25 mg XL tablet Take 0.5 Tabs by mouth daily. 11/10/20   Jennifer Horne MD   calcium acetate,phosphat bind, (PHOSLO) 667 mg cap Take 2 Caps by mouth three (3) times daily (with meals). Indications: low amount of calcium in the blood, renal osteodystrophy with hyperphosphatemia 11/10/20   Jennifer Horne MD   atorvastatin (Lipitor) 20 mg tablet Take 20 mg by mouth daily. Provider, Historical   warfarin (COUMADIN) 2.5 mg tablet Take 2.5 mg by mouth daily.     Provider, Historical   acetaminophen (TYLENOL) 500 mg tablet Take 1 Tab by mouth every four (4) hours as needed for Pain. Over the counter 1/20/19   Hemant Neff MD   omeprazole (PRILOSEC) 20 mg capsule Take 20 mg by mouth daily. Provider, Historical   calcitRIOL (ROCALTROL) 0.5 mcg capsule Take 0.5 mcg by mouth daily. Provider, Historical   aspirin 81 mg chewable tablet Take 81 mg by mouth daily. Other, MD Morro       REVIEW OF SYSTEMS:         Total of 12 systems reviewed as follows:       POSITIVE= underlined text  Negative = text not underlined  General:  fever, chills, sweats, generalized weakness, weight loss/gain,      loss of appetite   Eyes:    blurred vision, eye pain, loss of vision, double vision  ENT:    rhinorrhea, pharyngitis   Respiratory:   cough, sputum production, SOB, ZAMUDIO, wheezing, pleuritic pain   Cardiology:   chest pain, palpitations, orthopnea, PND, edema, syncope   Gastrointestinal:  abdominal pain , N/V, diarrhea, dysphagia, constipation, bleeding   Genitourinary:  frequency, urgency, dysuria, hematuria, incontinence   Muskuloskeletal :  arthralgia, myalgia, back pain  Hematology:  easy bruising, nose or gum bleeding, lymphadenopathy   Dermatological: rash, ulceration, pruritis, color change / jaundice  Endocrine:   hot flashes or polydipsia   Neurological:  headache, dizziness, confusion, focal weakness, paresthesia,     Speech difficulties, memory loss, gait difficulty  Psychological: Feelings of anxiety, depression, agitation    Objective:   VITALS:    Visit Vitals  /85   Pulse 72   Temp 98.1 °F (36.7 °C)   Resp 17   Ht 5' 7\" (1.702 m)   Wt 64 kg (141 lb 1.5 oz)   SpO2 100%   BMI 22.10 kg/m²       PHYSICAL EXAM:    General:    Alert, cooperative, no distress, appears stated age. HEENT: Atraumatic, anicteric sclerae, pink conjunctivae     No oral ulcers, mucosa moist, throat clear, dentition fair  Neck:  Supple, symmetrical,  thyroid: non tender  Lungs:   Clear to auscultation bilaterally. No Wheezing or Rhonchi. No rales.   Chest wall:  No tenderness  No Accessory muscle use. Left chest wall AICD noted  Heart:   Regular  rhythm,  No  murmur   No edema  Abdomen:   Soft, non-tender. Not distended. Bowel sounds normal  Extremities: No cyanosis. No clubbing, left arm AV fistula noted with good thrill    Skin turgor normal, Capillary refill normal, Radial dial pulse 2+  Skin:     Not pale. Not Jaundiced  No rashes   Psych:  Good insight. Not depressed. Not anxious or agitated. Neurologic: EOMs intact. No facial asymmetry. No aphasia or slurred speech. Symmetrical strength, Sensation grossly intact. Alert and oriented X 4.     _______________________________________________________________________  Care Plan discussed with:    Comments   Patient x    Family      RN x    Care Manager                    Consultant:  x ED MD Annalee Nageotte   _______________________________________________________________________  Expected  Disposition:   Home with Family x   HH/PT/OT/RN ?   SNF/LTC    KARLI    ________________________________________________________________________  TOTAL TIME:  64 Minutes    Critical Care Provided     Minutes non procedure based      Comments    x Reviewed previous records   >50% of visit spent in counseling and coordination of care x Discussion with patient and questions answered       ________________________________________________________________________  Signed: Enrique Officer, MD    Procedures: see electronic medical records for all procedures/Xrays and details which were not copied into this note but were reviewed prior to creation of Plan.     LAB DATA REVIEWED:    Recent Results (from the past 24 hour(s))   EKG, 12 LEAD, INITIAL    Collection Time: 11/27/21 12:47 PM   Result Value Ref Range    Ventricular Rate 69 BPM    Atrial Rate 69 BPM    P-R Interval 178 ms    QRS Duration 100 ms    Q-T Interval 572 ms    QTC Calculation (Bezet) 612 ms    Calculated P Axis 77 degrees    Calculated R Axis 66 degrees    Calculated T Axis 177 degrees    Diagnosis Normal sinus rhythm  Cannot rule out Inferior infarct , age undetermined  T wave abnormality, consider lateral ischemia  Prolonged QT  When compared with ECG of 29-MAR-2021 04:13,  T wave inversion more evident in Lateral leads  QT has lengthened     COVID-19 RAPID TEST    Collection Time: 11/27/21  1:01 PM   Result Value Ref Range    Specimen source Nasopharyngeal      COVID-19 rapid test Detected (AA) NOTD     CBC WITH AUTOMATED DIFF    Collection Time: 11/27/21  2:05 PM   Result Value Ref Range    WBC 7.2 4.1 - 11.1 K/uL    RBC 2.50 (L) 4.10 - 5.70 M/uL    HGB 7.6 (L) 12.1 - 17.0 g/dL    HCT 23.9 (L) 36.6 - 50.3 %    MCV 95.6 80.0 - 99.0 FL    MCH 30.4 26.0 - 34.0 PG    MCHC 31.8 30.0 - 36.5 g/dL    RDW 18.9 (H) 11.5 - 14.5 %    PLATELET 84 (L) 500 - 400 K/uL    NRBC 1.4 (H) 0  WBC    ABSOLUTE NRBC 0.10 (H) 0.00 - 0.01 K/uL    NEUTROPHILS 65 32 - 75 %    LYMPHOCYTES 18 12 - 49 %    MONOCYTES 9 5 - 13 %    EOSINOPHILS 6 0 - 7 %    BASOPHILS 1 0 - 1 %    IMMATURE GRANULOCYTES 1 (H) 0.0 - 0.5 %    ABS. NEUTROPHILS 4.7 1.8 - 8.0 K/UL    ABS. LYMPHOCYTES 1.3 0.8 - 3.5 K/UL    ABS. MONOCYTES 0.6 0.0 - 1.0 K/UL    ABS. EOSINOPHILS 0.4 0.0 - 0.4 K/UL    ABS. BASOPHILS 0.1 0.0 - 0.1 K/UL    ABS. IMM.  GRANS. 0.1 (H) 0.00 - 0.04 K/UL    DF SMEAR SCANNED      PLATELET COMMENTS Large Platelets      RBC COMMENTS ANISOCYTOSIS  1+        RBC COMMENTS TARGET CELLS  PRESENT       PROTHROMBIN TIME + INR    Collection Time: 11/27/21  3:14 PM   Result Value Ref Range    INR 1.7 (H) 0.9 - 1.1      Prothrombin time 16.9 (H) 9.0 - 11.1 sec   PROCALCITONIN    Collection Time: 11/27/21  3:14 PM   Result Value Ref Range    Procalcitonin 26.76 ng/mL   C REACTIVE PROTEIN, QT    Collection Time: 11/27/21  3:14 PM   Result Value Ref Range    C-Reactive protein 9.91 (H) 0.00 - 0.60 mg/dL   FERRITIN    Collection Time: 11/27/21  3:14 PM   Result Value Ref Range    Ferritin 9,604 (H) 26 - 388 NG/ML   FIBRINOGEN    Collection Time: 11/27/21 3:14 PM   Result Value Ref Range    Fibrinogen 257 200 - 293 mg/dL   METABOLIC PANEL, COMPREHENSIVE    Collection Time: 11/27/21  3:20 PM   Result Value Ref Range    Sodium 134 (L) 136 - 145 mmol/L    Potassium 4.4 3.5 - 5.1 mmol/L    Chloride 94 (L) 97 - 108 mmol/L    CO2 25 21 - 32 mmol/L    Anion gap 15 5 - 15 mmol/L    Glucose 60 (L) 65 - 100 mg/dL    BUN 28 (H) 6 - 20 MG/DL    Creatinine 6.53 (H) 0.70 - 1.30 MG/DL    BUN/Creatinine ratio 4 (L) 12 - 20      GFR est AA 11 (L) >60 ml/min/1.73m2    GFR est non-AA 9 (L) >60 ml/min/1.73m2    Calcium 8.3 (L) 8.5 - 10.1 MG/DL    Bilirubin, total 0.7 0.2 - 1.0 MG/DL    ALT (SGPT) 321 (H) 12 - 78 U/L    AST (SGOT) 697 (H) 15 - 37 U/L    Alk.  phosphatase 228 (H) 45 - 117 U/L    Protein, total 8.7 (H) 6.4 - 8.2 g/dL    Albumin 3.6 3.5 - 5.0 g/dL    Globulin 5.1 (H) 2.0 - 4.0 g/dL    A-G Ratio 0.7 (L) 1.1 - 2.2     TROPONIN-HIGH SENSITIVITY    Collection Time: 11/27/21  3:20 PM   Result Value Ref Range    Troponin-High Sensitivity 519 (HH) 0 - 76 ng/L   LD    Collection Time: 11/27/21  3:20 PM   Result Value Ref Range     (H) 85 - 241 U/L   POC LACTIC ACID    Collection Time: 11/27/21  3:35 PM   Result Value Ref Range    Lactic Acid (POC) 6.08 (HH) 0.40 - 2.00 mmol/L   POC LACTIC ACID    Collection Time: 11/27/21  5:25 PM   Result Value Ref Range    Lactic Acid (POC) 6.35 (HH) 0.40 - 2.00 mmol/L   POC LACTIC ACID    Collection Time: 11/27/21  7:28 PM   Result Value Ref Range    Lactic Acid (POC) 5.33 (HH) 0.40 - 2.00 mmol/L

## 2021-11-29 ENCOUNTER — APPOINTMENT (OUTPATIENT)
Dept: ULTRASOUND IMAGING | Age: 46
DRG: 207 | End: 2021-11-29
Attending: INTERNAL MEDICINE
Payer: MEDICARE

## 2021-11-29 LAB
ALBUMIN SERPL-MCNC: 3.1 G/DL (ref 3.5–5)
ALBUMIN/GLOB SERPL: 0.6 {RATIO} (ref 1.1–2.2)
ALP SERPL-CCNC: 227 U/L (ref 45–117)
ALT SERPL-CCNC: 584 U/L (ref 12–78)
ANION GAP SERPL CALC-SCNC: 22 MMOL/L (ref 5–15)
AST SERPL-CCNC: 1414 U/L (ref 15–37)
BASOPHILS # BLD: 0 K/UL (ref 0–0.1)
BASOPHILS NFR BLD: 0 % (ref 0–1)
BILIRUB SERPL-MCNC: 1.8 MG/DL (ref 0.2–1)
BUN SERPL-MCNC: 61 MG/DL (ref 6–20)
BUN/CREAT SERPL: 7 (ref 12–20)
CALCIUM SERPL-MCNC: 8.1 MG/DL (ref 8.5–10.1)
CHLORIDE SERPL-SCNC: 94 MMOL/L (ref 97–108)
CO2 SERPL-SCNC: 16 MMOL/L (ref 21–32)
CREAT SERPL-MCNC: 8.39 MG/DL (ref 0.7–1.3)
CRP SERPL-MCNC: 7.2 MG/DL (ref 0–0.6)
D DIMER PPP FEU-MCNC: 6.77 MG/L FEU (ref 0–0.65)
DIFFERENTIAL METHOD BLD: ABNORMAL
EOSINOPHIL # BLD: 0 K/UL (ref 0–0.4)
EOSINOPHIL NFR BLD: 0 % (ref 0–7)
ERYTHROCYTE [DISTWIDTH] IN BLOOD BY AUTOMATED COUNT: 19.2 % (ref 11.5–14.5)
GLOBULIN SER CALC-MCNC: 4.9 G/DL (ref 2–4)
GLUCOSE SERPL-MCNC: 89 MG/DL (ref 65–100)
HCT VFR BLD AUTO: 27.8 % (ref 36.6–50.3)
HGB BLD-MCNC: 8.6 G/DL (ref 12.1–17)
IMM GRANULOCYTES # BLD AUTO: 0.1 K/UL (ref 0–0.04)
IMM GRANULOCYTES NFR BLD AUTO: 1 % (ref 0–0.5)
INR PPP: 2.3 (ref 0.9–1.1)
LACTATE SERPL-SCNC: 3.2 MMOL/L (ref 0.4–2)
LACTATE SERPL-SCNC: 6.7 MMOL/L (ref 0.4–2)
LACTATE SERPL-SCNC: 7.9 MMOL/L (ref 0.4–2)
LYMPHOCYTES # BLD: 0.1 K/UL (ref 0.8–3.5)
LYMPHOCYTES NFR BLD: 1 % (ref 12–49)
MAGNESIUM SERPL-MCNC: 2.6 MG/DL (ref 1.6–2.4)
MCH RBC QN AUTO: 29.9 PG (ref 26–34)
MCHC RBC AUTO-ENTMCNC: 30.9 G/DL (ref 30–36.5)
MCV RBC AUTO: 96.5 FL (ref 80–99)
MONOCYTES # BLD: 0.8 K/UL (ref 0–1)
MONOCYTES NFR BLD: 7 % (ref 5–13)
NEUTS SEG # BLD: 9.9 K/UL (ref 1.8–8)
NEUTS SEG NFR BLD: 91 % (ref 32–75)
NRBC # BLD: 1.57 K/UL (ref 0–0.01)
NRBC BLD-RTO: 14.4 PER 100 WBC
PHOSPHATE SERPL-MCNC: 8 MG/DL (ref 2.6–4.7)
PLATELET # BLD AUTO: 100 K/UL (ref 150–400)
PMV BLD AUTO: 13 FL (ref 8.9–12.9)
POTASSIUM SERPL-SCNC: 6 MMOL/L (ref 3.5–5.1)
PROT SERPL-MCNC: 8 G/DL (ref 6.4–8.2)
PROTHROMBIN TIME: 22.9 SEC (ref 9–11.1)
RBC # BLD AUTO: 2.88 M/UL (ref 4.1–5.7)
RBC MORPH BLD: ABNORMAL
RBC MORPH BLD: ABNORMAL
SODIUM SERPL-SCNC: 132 MMOL/L (ref 136–145)
WBC # BLD AUTO: 10.9 K/UL (ref 4.1–11.1)

## 2021-11-29 PROCEDURE — 83605 ASSAY OF LACTIC ACID: CPT

## 2021-11-29 PROCEDURE — 74011000258 HC RX REV CODE- 258: Performed by: INTERNAL MEDICINE

## 2021-11-29 PROCEDURE — 77010033678 HC OXYGEN DAILY

## 2021-11-29 PROCEDURE — 85025 COMPLETE CBC W/AUTO DIFF WBC: CPT

## 2021-11-29 PROCEDURE — 85610 PROTHROMBIN TIME: CPT

## 2021-11-29 PROCEDURE — 80053 COMPREHEN METABOLIC PANEL: CPT

## 2021-11-29 PROCEDURE — 74011250636 HC RX REV CODE- 250/636: Performed by: INTERNAL MEDICINE

## 2021-11-29 PROCEDURE — 85379 FIBRIN DEGRADATION QUANT: CPT

## 2021-11-29 PROCEDURE — 76705 ECHO EXAM OF ABDOMEN: CPT

## 2021-11-29 PROCEDURE — 36415 COLL VENOUS BLD VENIPUNCTURE: CPT

## 2021-11-29 PROCEDURE — 74011250637 HC RX REV CODE- 250/637: Performed by: INTERNAL MEDICINE

## 2021-11-29 PROCEDURE — 90935 HEMODIALYSIS ONE EVALUATION: CPT

## 2021-11-29 PROCEDURE — 84100 ASSAY OF PHOSPHORUS: CPT

## 2021-11-29 PROCEDURE — 65660000001 HC RM ICU INTERMED STEPDOWN

## 2021-11-29 PROCEDURE — 83735 ASSAY OF MAGNESIUM: CPT

## 2021-11-29 PROCEDURE — 86140 C-REACTIVE PROTEIN: CPT

## 2021-11-29 RX ORDER — WARFARIN 2 MG/1
2 TABLET ORAL ONCE
Status: COMPLETED | OUTPATIENT
Start: 2021-11-29 | End: 2021-11-29

## 2021-11-29 RX ADMIN — WARFARIN SODIUM 2 MG: 2 TABLET ORAL at 18:07

## 2021-11-29 RX ADMIN — OXYCODONE 5 MG: 5 TABLET ORAL at 13:30

## 2021-11-29 RX ADMIN — CALCIUM ACETATE 1334 MG: 667 CAPSULE ORAL at 13:27

## 2021-11-29 RX ADMIN — CEFEPIME HYDROCHLORIDE 1 G: 1 INJECTION, POWDER, FOR SOLUTION INTRAMUSCULAR; INTRAVENOUS at 18:07

## 2021-11-29 RX ADMIN — Medication 10 ML: at 21:40

## 2021-11-29 RX ADMIN — Medication 1 CAPSULE: at 13:28

## 2021-11-29 RX ADMIN — GUAIFENESIN 600 MG: 600 TABLET, EXTENDED RELEASE ORAL at 21:40

## 2021-11-29 RX ADMIN — FENTANYL CITRATE 25 MCG: 0.05 INJECTION, SOLUTION INTRAMUSCULAR; INTRAVENOUS at 08:39

## 2021-11-29 RX ADMIN — Medication 10 ML: at 06:55

## 2021-11-29 RX ADMIN — PANTOPRAZOLE SODIUM 40 MG: 40 TABLET, DELAYED RELEASE ORAL at 13:28

## 2021-11-29 RX ADMIN — OXYCODONE HYDROCHLORIDE AND ACETAMINOPHEN 500 MG: 500 TABLET ORAL at 13:28

## 2021-11-29 RX ADMIN — FENTANYL CITRATE 25 MCG: 0.05 INJECTION, SOLUTION INTRAMUSCULAR; INTRAVENOUS at 02:57

## 2021-11-29 RX ADMIN — VANCOMYCIN HYDROCHLORIDE 750 MG: 750 INJECTION, POWDER, LYOPHILIZED, FOR SOLUTION INTRAVENOUS at 16:27

## 2021-11-29 RX ADMIN — CALCIUM ACETATE 1334 MG: 667 CAPSULE ORAL at 18:07

## 2021-11-29 RX ADMIN — Medication 10 ML: at 13:06

## 2021-11-29 RX ADMIN — DEXAMETHASONE 6 MG: 4 TABLET ORAL at 13:28

## 2021-11-29 RX ADMIN — ASPIRIN 81 MG CHEWABLE TABLET 81 MG: 81 TABLET CHEWABLE at 13:28

## 2021-11-29 RX ADMIN — CALCITRIOL CAPSULES 0.25 MCG 0.5 MCG: 0.25 CAPSULE ORAL at 13:28

## 2021-11-29 RX ADMIN — ZINC SULFATE 220 MG (50 MG) CAPSULE 1 CAPSULE: CAPSULE at 13:28

## 2021-11-29 NOTE — PROCEDURES
[facility]  [phone]    Vitals   Pre   Post   Assessment   Pre   Post     Temp  Temp: 97.6 °F (36.4 °C) (11/29/21 0900)  97.52 LOC  Alert and oriented Alert and oriented   HR   Pulse (Heart Rate): 74 (11/29/21 0900) 72 Lungs   Unlabored even, clear to auscultation on room air  unlabored, even at rest productive cough   B/P   BP: 120/75 (11/29/21 0900) 119/69 Cardiac   Regular bedside tele  bedside telemetry    Resp   Resp Rate: 12 (11/29/21 0900) 12 Skin   Warm and dry   warm and  Dry    Pain level  Pain Intensity 1: 8 (11/29/21 0839) No verbal complaints at this time Edema  BLE 1     BLE    Orders:    Duration:   Start:    0900 End:    1230 Total:   3.5   Dialyzer:   Dialyzer/Set Up Inspection: Revaclear (11/29/21 0900)   K Bath:   Dialysate K (mEq/L): 2 (11/29/21 0900)   Ca Bath:   Dialysate CA (mEq/L): 2.5 (11/29/21 0900)   Na/Bicarb:   Dialysate NA (mEq/L): 140 (11/29/21 0900)   Target Fluid Removal:   Goal/Amount of Fluid to Remove (mL): 2500 mL (11/29/21 0900)   Access     Type & Location:   SUZANNE AV graft, + thrill/bruit, no redness or drainage, cleansed with alcohol followed by chlorhexidine per policy, cannulated with two 115 gauge  Needles, secured with tape, flushed   Labs     Obtained/Reviewed   Critical Results Called   Date when labs were drawn-  Hgb-    HGB   Date Value Ref Range Status   11/29/2021 8.6 (L) 12.1 - 17.0 g/dL Final     K-    Potassium   Date Value Ref Range Status   11/29/2021 6.0 (H) 3.5 - 5.1 mmol/L Final     Ca-   Calcium   Date Value Ref Range Status   11/29/2021 8.1 (L) 8.5 - 10.1 MG/DL Final     Bun-   BUN   Date Value Ref Range Status   11/29/2021 61 (H) 6 - 20 MG/DL Final     Comment:     INVESTIGATED PER DELTA CHECK PROTOCOL     Creat-   Creatinine   Date Value Ref Range Status   11/29/2021 8.39 (H) 0.70 - 1.30 MG/DL Final        Medications/ Blood Products Given     Name   Dose   Route and Time                     Blood Volume Processed (BVP):    67.1 Net Fluid   Removed:  2000   Comments   Time Out Done: 4810  Primary Nurse Rpt Pre: MARCE Rangel RN   Primary Nurse Rpt Post: Abhijit Rae RN  Pt Education: informed consent  Care Plan: continue current HD plan of care   Tx Summary:  0900 HD initiated as ordered. 1000 Dr. Didi Dunbar by to see patient on HD   1230 treatment completed, all possible blood returned, hemostasis achieved <10 minutes, dressing clean, dry and intact, + thrill/bruit. All dialysis related medications have been reviewed.     Admiting Diagnosis: covid +  Pt's [facility] clinic- [facility]   Consent signed - Informed Consent Verified: Yes (11/29/21 0900)  Hepatitis Status- 11/11/21 antigen negative immune  Machine #- Machine Number: [id number] (11/29/21 0900)  Telemetry status- bedside

## 2021-11-29 NOTE — PROGRESS NOTES
Pharmacist Daily Dosing of Warfarin    Indication & Goal INR: DVT, INR Goal 2-3    PTA Warfarin Dose: 2.5 mg daily    Notable concurrent conditions and medications: Aspirin    Labs:  Recent Labs     11/29/21  0307 11/28/21  0140 11/28/21  0140 11/27/21  1520 11/27/21  1514 11/27/21  1405 11/27/21  1405   INR 2.3*  --  1.7*  --  1.7*  --   --    HGB 8.6*   < > 8.4*  --   --    < > 7.6*   *  --  92*  --   --   --  84*   TBILI 1.8*  --   --  0.7  --   --   --    ALB 3.1*  --   --  3.6  --    < >  --     < > = values in this interval not displayed. Impression/Plan:   Received 5.5 mg total of warfarin 11/28. No dose 11/27  Give warfarin 2 mg PO x1 tonight  Daily INR has been ordered  CBC w/o differential every other day has been ordered     Pharmacy will follow daily and adjust the dose as appropriate.     Thank you,  Ian Doe, PHARMD

## 2021-11-29 NOTE — PROGRESS NOTES
Name: Miriam Lower: [address]   : 1975 Admit Date: 2021   Phone: [phone]  Room: 3   PCP: Adela Humphrey MD  MRN: [id number]   Date: 2021  Code: Full Code          IMPRESSION  · Acute Respiratory Failure with Hypoxia- now on room air  · Covid-19; CXR currently clear  · Unvaccinated for COVID  · ESRD  · CM s/p ICD placement  · Severe Pulmonary HTN   · CHF LVEF 35- 40%    PLAN  · Supplemental oxygen to keep sats >90%  · Continue Dexamethasone; low threshold to increase if he worsens  · Start Baricitinib if oxygen requirements increase  · On Cefepime  · Renal following  · Coumadin dosed per pharmacy  · Trend inflammatory markers  · Encourage IS use, proning  · At high risk for decompensation given his comorbidities, would continue to monitor closely  · COVID vaccine counseling done      21: Events of weekend, Notes and data reviewed. Patient now starting hemodialysis. Has back and abdominal discomfort. No nausea. Denies cough congestion. Now on room air. Denies shortness of breath at rest.  Explained to the patient the potential of him getting much worse. Need to monitor him closely. Course is unpredictable. Currently on some treatment. Not a candidate for Remdesivir. Chart and notes reviewed. Data reviewed. I review the patient's current medications in the medical record at each encounter.  I have evaluated and examined the patient. 8:40 AM       History was obtained from patient. I was asked by Joanna William MD to see Claudell Patch in consultation for a chief complaint of Covid-19 infection. History of Present Illness:   if a 41 yo gentleman with a PMH ESRD on HD MWF, hypertension, ischemic cardiomyopathy s/pp ICD placement, and GERD that presented to the ED on  with worsening fatigue and SOB. Rapid covid test positive. He is unvaccinated against Covid-19.   He is currently on RA on my exam today with sats at 96%. Feels a little better. Images:      CXR 11/27: no acute process    Abdominal CT 11/27:  Relatively small right pleural effusion and minimal left pleural effusion, increased since 11/5/2021. Estil Perez No acute findings in the abdomen or pelvis. .  Renal atrophy and calcified transplant kidneys. Hepatomegaly. No focal lesion. Small amount of ascites. Anasarca. D-dimer 3.15  Lactic acid 8.5  Trop 519  Ferritin 9604  Procalcitonin 26.76  CRP 9.91  Blood cultures negative x 18 hours  ECHO 11/7/21: EF 35-40%, mildly dilated LA, mildly dilated RV, mildly reduced systolic function, trave MVR, moderate TVR. Severe pulmonary hypertension RVSP 85mmHg,     Past Medical History:   Diagnosis Date    Abdominal hematoma     AICD (automatic cardioverter/defibrillator) present     CAD (coronary artery disease)     anterior MI s/p 2 stents  2007    Chronic abdominal pain     Chronic kidney disease     ESRD; Dialysis dependent.  Home [facility] does labs- [facility] tpke    DVT (deep venous thrombosis) ([address] 2001    DVT of popliteal vein ([address] 11/2011    not anticoagulated due to bleeding, IVC filter    Endocarditis     Gallstone pancreatitis     Gastrointestinal disorder     acid reflux    Gastrointestinal disorder     peptic ulcer    Hemodialysis patient ([address]     High cholesterol     Hypertension     Kidney transplant     b/l kidneys 1995, 2001    Long term current use of anticoagulant therapy     Nephrotic syndrome     Other ill-defined conditions(799.89)     kidny transplant x2,  on dialysis    Other ill-defined conditions(799.89)     home dialysis    Other ill-defined conditions(799.89)     hx recurrent left leg DVT    Peritonitis ([address]     Seizures ([address] 2015    most recent- only had three in lifetime    Small bowel obstruction (HCC)     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)        Past Surgical History:   Procedure Laterality Date    HX APPENDECTOMY      HX CHOLECYSTECTOMY      HX OTHER SURGICAL  11/2011    exploratory laparotomy    HX OTHER SURGICAL      fem-pop    HX OTHER SURGICAL  02/2013    right upper extremity fistula    HX PACEMAKER Left 6/2009    AICD-st latonya-not connected    HX PACEMAKER Left     AICD-left side-BS-working    HX SMALL BOWEL RESECTION      HX TRANSPLANT  2001     Kidney    HX TRANSPLANT  1992    kidney    HX VASCULAR ACCESS Right     femoral access for HD- does 5 day dialysis @ home    IR REMOVE TUNL CVAD W/O PORT / PUMP  10/29/2020    IR REPLACE CVC TUNNELED W/O PORT  9/10/2020    UT CARDIAC SURG PROCEDURE UNLIST  2007    2 stents    UT EDG US EXAM SURGICAL ALTER STOM DUODENUM/JEJUNUM  7/15/2011         UT ESOPHAGOGASTRODUODENOSCOPY TRANSORAL DIAGNOSTIC  5/24/2012         VASCULAR SURGERY PROCEDURE UNLIST  11/14/2017    Insertion AV graft right upper arm (bovine); ligation of AV fistula right arm       Family History   Problem Relation Age of Onset    COPD Mother     Lung Disease Mother     Cancer Father         stomach    Cancer Maternal Grandmother        Social History     Tobacco Use    Smoking status: Never Smoker    Smokeless tobacco: Never Used   Substance Use Topics    Alcohol use: No       Allergies   Allergen Reactions    Shellfish Containing Products Swelling     Break out in rash, trouble breathing    Contrast Agent [Iodine] Shortness of Breath    Heparin Analogues Other (comments)     Positive history of HIT       Current Facility-Administered Medications   Medication Dose Route Frequency    sodium chloride (NS) flush 5-10 mL  5-10 mL IntraVENous PRN    acetaminophen (TYLENOL) tablet 650 mg  650 mg Oral Q6H PRN    L.acidophilus-paracasei-S.thermophil-bifidobacter (RISAQUAD) 8 billion cell capsule  1 Capsule Oral DAILY    aspirin chewable tablet 81 mg  81 mg Oral DAILY    calcitRIOL (ROCALTROL) capsule 0.5 mcg  0.5 mcg Oral DAILY    calcium acetate(phosphat bind) (PHOSLO) capsule 1,334 mg  2 Capsule Oral TID WITH MEALS    pantoprazole (PROTONIX) tablet 40 mg  40 mg Oral ACB    sodium chloride (NS) flush 5-40 mL  5-40 mL IntraVENous Q8H    sodium chloride (NS) flush 5-40 mL  5-40 mL IntraVENous PRN    polyethylene glycol (MIRALAX) packet 17 g  17 g Oral DAILY PRN    ondansetron (ZOFRAN ODT) tablet 4 mg  4 mg Oral Q8H PRN    Or    ondansetron (ZOFRAN) injection 4 mg  4 mg IntraVENous Q6H PRN    oxyCODONE IR (ROXICODONE) tablet 5 mg  5 mg Oral Q4H PRN    ascorbic acid (vitamin C) (VITAMIN C) tablet 500 mg  500 mg Oral DAILY    zinc sulfate (ZINCATE) 50 mg zinc (220 mg) capsule 1 Capsule  1 Capsule Oral DAILY    guaiFENesin ER (MUCINEX) tablet 600 mg  600 mg Oral Q12H    albuterol (PROVENTIL HFA, VENTOLIN HFA, PROAIR HFA) inhaler 2 Puff  2 Puff Inhalation Q6H PRN    dexAMETHasone (DECADRON) tablet 6 mg  6 mg Oral DAILY    Warfarin - Pharmacy to Dose   Other Rx Dosing/Monitoring    cefepime (MAXIPIME) 1 g in 0.9% sodium chloride (MBP/ADV) 50 mL MBP  1 g IntraVENous Q24H    Vancomycin - Pharmacy to dose   Other Rx Dosing/Monitoring         REVIEW OF SYSTEMS   Negative except as stated in the HPI. Physical Exam:   Visit Vitals  /69   Pulse 72   Temp 97.5 °F (36.4 °C) (Oral)   Resp 12   Ht 5' 7\" (1.702 m)   Wt 64.6 kg (142 lb 6.7 oz)   SpO2 97%   BMI 22.31 kg/m²       General:  Alert, cooperative, no distress, appears stated age. Head:  Normocephalic, without obvious abnormality, atraumatic. Eyes:  Conjunctivae/corneas clear. Nose: Nares normal. Septum midline. Mucosa normal.    Throat: Lips, mucosa, and tongue normal. Teeth and gums normal.   Neck: Supple, symmetrical, trachea midline. Lungs:   Clear to auscultation bilaterally. Chest wall:  No tenderness or deformity. Heart:  Regular rate and rhythm, S1, S2 normal, no murmur, click, rub or gallop. Abdomen:   Soft, non-tender. Bowel sounds normal. No masses,  No organomegaly.    Extremities: Extremities normal, atraumatic, no cyanosis or edema. Pulses: 2+ and symmetric all extremities. Skin: Skin color, texture, turgor normal. No rashes or lesions   Lymph nodes: Cervical, supraclavicular, and axillary nodes normal.   Neurologic: Grossly nonfocal       Lab Results   Component Value Date/Time    Sodium 132 (L) 11/29/2021 03:07 AM    Potassium 6.0 (H) 11/29/2021 03:07 AM    Chloride 94 (L) 11/29/2021 03:07 AM    CO2 16 (L) 11/29/2021 03:07 AM    BUN 61 (H) 11/29/2021 03:07 AM    Creatinine 8.39 (H) 11/29/2021 03:07 AM    Glucose 89 11/29/2021 03:07 AM    Calcium 8.1 (L) 11/29/2021 03:07 AM    Magnesium 2.6 (H) 11/29/2021 03:07 AM    Phosphorus 8.0 (H) 11/29/2021 03:07 AM    Lactic acid 6.7 (HH) 11/29/2021 08:37 AM       Lab Results   Component Value Date/Time    WBC 10.9 11/29/2021 03:07 AM    HGB 8.6 (L) 11/29/2021 03:07 AM    PLATELET 166 (L) 67/63/1316 03:07 AM    MCV 96.5 11/29/2021 03:07 AM       Lab Results   Component Value Date/Time    INR 2.3 (H) 11/29/2021 03:07 AM    aPTT 47.0 (H) 11/05/2021 04:34 PM    Alk.  phosphatase 227 (H) 11/29/2021 03:07 AM    Protein, total 8.0 11/29/2021 03:07 AM    Albumin 3.1 (L) 11/29/2021 03:07 AM    Globulin 4.9 (H) 11/29/2021 03:07 AM       Lab Results   Component Value Date/Time    Iron 57 11/22/2018 05:27 AM    TIBC 234 (L) 11/22/2018 05:27 AM    Iron % saturation 24 11/22/2018 05:27 AM    Ferritin 9,604 (H) 11/27/2021 03:14 PM       Lab Results   Component Value Date/Time    C-Reactive protein 7.20 (H) 11/29/2021 03:07 AM    TSH 1.28 09/26/2014 08:32 PM        No results found for: PH, PHI, PCO2, PCO2I, PO2, PO2I, HCO3, HCO3I, FIO2, FIO2I    Lab Results   Component Value Date/Time     01/16/2019 07:09 AM    CK-MB Index 2.0 01/16/2019 07:09 AM    Troponin-I, Qt. <0.05 03/29/2021 05:06 AM    BNP 1,946 (H) 09/16/2014 04:17 AM        Lab Results   Component Value Date/Time    Culture result: NO GROWTH 2 DAYS 11/27/2021 02:05 PM    Culture result: NO GROWTH 2 DAYS 11/27/2021 01:40 PM Culture result: NO GROWTH 5 DAYS 11/07/2021 09:50 AM       Lab Results   Component Value Date/Time    Hepatitis B surface Ag 0.39 11/11/2021 03:15 PM       Lab Results   Component Value Date/Time    Vancomycin,trough 17.7 (H) 12/08/2011 02:59 AM    Vancomycin,trough 19.5 (H) 12/02/2011 02:40 AM     01/16/2019 07:09 AM    CK 62 05/20/2016 06:34 AM       Lab Results   Component Value Date/Time    Color RED 11/16/2012 05:15 PM    Appearance OPAQUE 11/16/2012 05:15 PM    Specific gravity 1.020 11/16/2012 05:15 PM    pH (UA) 8.0 11/16/2012 05:15 PM    Protein >300 (A) 11/16/2012 05:15 PM    Glucose 100 (A) 11/16/2012 05:15 PM    Ketone NEGATIVE  11/16/2012 05:15 PM    Bilirubin NEGATIVE  09/17/2012 01:35 AM    Blood MODERATE (A) 11/16/2012 05:15 PM    Urobilinogen 0.2 11/16/2012 05:15 PM    Nitrites NEGATIVE  11/16/2012 05:15 PM    Leukocyte Esterase NEGATIVE  11/16/2012 05:15 PM    WBC >100 (H) 11/16/2012 05:15 PM    RBC >100 (H) 11/16/2012 05:15 PM    Bacteria 3+ (A) 11/16/2012 05:15 PM     ·     Thanks for the consult.       Rachel Corrales MD

## 2021-11-29 NOTE — PROGRESS NOTES
Nephrology Progress Note  Blaise Leblanc     [email or web address]  Phone - [phone]   Patient: Priscilla Martinez    YOB: 1975        Date- 11/29/2021   Admit Date: 11/27/2021  CC: Follow up for  ESRD          IMPRESSION & PLAN:   · ESRD- home HD 5 days per week- Follows up with Dr Ross Ybarra at [facility] ( [facility]  · Covid 19 infection  · hyperkalemia  · Left arm swelling- s/p angioplasty of left subclavian vein  · Gram positive sepsis from dental abcess  · s/p lead extraction at [facility]  · Anemia of ckd  · Hx of renal tx in past times 2.  · Hypertension  · CAD  · Failed RTX times 2         H/o DVT, s/p ivc filter/ h/o HIT      PLAN-   Seen on hd today   k should improve with dialysis today   Continue phoslo    EPO for anemia     Subjective: Interval History:   - seen on hd today  k high - 6.0  No sob  No fever      11/28   Patient was seen and examined. Well-known to me from his previous admissions. He was recently here for gram-positive sepsis from dental abscesses. He is at home evaluation of Dr. Ross Ybarra at [facility]. Because of the requirement for needing IV antibiotics he was changed to in centre dialysis at [facility] with Dr. Ross Ybarra. He presented again with: The fever and chills. Initial evaluation in the ER showed positive COVID-19 test.  Patient admitted for further evaluation. Renal consult was requested for management of hemodialysis.     Objective:   Vitals:    11/29/21 0900 11/29/21 0915 11/29/21 0930 11/29/21 0945   BP: 120/75 121/79 116/70 112/73   Pulse: 74 73 75 73   Resp: 12 21 17 13   Temp: 97.6 °F (36.4 °C)      TempSrc: Axillary      SpO2: 99% 99% 93% 97%   Weight:       Height:          11/28 0701 - 11/29 0700  In: 1080 [P.O.:1080]  Out: 0   Last 3 Recorded Weights in this Encounter    11/27/21 1112 11/28/21 1237   Weight: 64 kg (141 lb 1.5 oz) 64.6 kg (142 lb 6.7 oz)      Physical exam:   GEN: NAD  NECK- Supple  RESP: no distress  NEURO:non focal      Chart reviewed. Pertinent Notes reviewed.      Data Review :  Recent Labs     11/29/21 0307 11/28/21 0140 11/27/21  1520   * 130* 134*   K 6.0* 5.5* 4.4   CL 94* 93* 94*   CO2 16* 19* 25   BUN 61* 34* 28*   CREA 8.39* 6.62* 6.53*   GLU 89 85 60*   CA 8.1* 8.4* 8.3*   MG 2.6*  --   --    PHOS 8.0*  --   --      Recent Labs     11/29/21 0307 11/28/21 0140 11/27/21  1405   WBC 10.9 5.7 7.2   HGB 8.6* 8.4* 7.6*   HCT 27.8* 27.1* 23.9*   * 92* 84*     Recent Labs     11/27/21  1514   FERR 9,604*      Medication list  reviewed  Current Facility-Administered Medications   Medication Dose Route Frequency    fentaNYL citrate (PF) injection 25 mcg  25 mcg IntraVENous Q4H PRN    sodium chloride (NS) flush 5-10 mL  5-10 mL IntraVENous PRN    acetaminophen (TYLENOL) tablet 650 mg  650 mg Oral Q6H PRN    L.acidophilus-paracasei-S.thermophil-bifidobacter (RISAQUAD) 8 billion cell capsule  1 Capsule Oral DAILY    aspirin chewable tablet 81 mg  81 mg Oral DAILY    calcitRIOL (ROCALTROL) capsule 0.5 mcg  0.5 mcg Oral DAILY    calcium acetate(phosphat bind) (PHOSLO) capsule 1,334 mg  2 Capsule Oral TID WITH MEALS    pantoprazole (PROTONIX) tablet 40 mg  40 mg Oral ACB    sodium chloride (NS) flush 5-40 mL  5-40 mL IntraVENous Q8H    sodium chloride (NS) flush 5-40 mL  5-40 mL IntraVENous PRN    polyethylene glycol (MIRALAX) packet 17 g  17 g Oral DAILY PRN    ondansetron (ZOFRAN ODT) tablet 4 mg  4 mg Oral Q8H PRN    Or    ondansetron (ZOFRAN) injection 4 mg  4 mg IntraVENous Q6H PRN    oxyCODONE IR (ROXICODONE) tablet 5 mg  5 mg Oral Q4H PRN    ascorbic acid (vitamin C) (VITAMIN C) tablet 500 mg  500 mg Oral DAILY    zinc sulfate (ZINCATE) 50 mg zinc (220 mg) capsule 1 Capsule  1 Capsule Oral DAILY    guaiFENesin ER (MUCINEX) tablet 600 mg  600 mg Oral Q12H    albuterol (PROVENTIL HFA, VENTOLIN HFA, PROAIR HFA) inhaler 2 Puff  2 Puff Inhalation Q6H PRN    dexAMETHasone (DECADRON) tablet 6 mg  6 mg Oral DAILY    Warfarin - Pharmacy to Dose   Other Rx Dosing/Monitoring    cefepime (MAXIPIME) 1 g in 0.9% sodium chloride (MBP/ADV) 50 mL MBP  1 g IntraVENous Q24H    Vancomycin - Pharmacy to dose   Other Rx Dosing/Monitoring          Theo Righter, [id number] [facility]  [facility] / [facility] [address], [address]  Phone - [phone]               Fax - [phone]

## 2021-11-29 NOTE — PROGRESS NOTES
Hospitalist Progress Note    NAME: Sanjuana Velazco   :  1975   MRN:  [id number]       Assessment / Plan:  Acute Covid 19 infection syndrome POA-onset of symptoms past Wednesday 3 days ago per patient, not vaccinated  Mild hypoxic respiratory failure POA requiring only 2 L/m of oxygen in ED  History of recent Streptococcus intermedius bacteremia for which patient was started on IV vancomycin with dialysis on discharge on 2021  WBC 7.2  Platelet 84 (due to viral syndrome)  Lactate 6.08-> 6.35-> 5.33  INR 1.7  Fibrinogen 257  LD= 909  LFTs 321/697/228 (due to viral syndrome)  Ferritin 1904  CRP 9.91  Rapid Covid test positive  Blood culture pending     Admit to stepdown unit as patient at high risk for further deterioration due to immunocompromise state having high inflammatory markers with positive rapid COVID-19 test   Oxygen to keep sats are than 92%, taper to off as able  P.o. Decadron 6 mg daily for 10 days  Mucinex twice daily  Vitamin C, zinc  IV antibiotics -IV vancomycin per recent discharge plan for bacteremia last admission + empiric IV cefepime to cover gram-negative sepsis due to severe lactic acidosis.   F/u cultures, if negative x 72 hrs, will DC cefepime  Droplet plus precaution for COVID-19 infection  Continue anticoagulation with Coumadin (patient had a history of DVT with graft thrombosis history)  Inpatient pharmacy consult for advanced COVID-19 therapy if indicated, patient does not qualify for any additional therapies   Lactate improving  Pulm following  Check abdominal US with dopplers to rule out PVT    Abdominal pain  CT scan reviewed  Monitor  LFT's worsening, likely due to virus     ESRD on hemodialysis   Inpatient nephrology consult for dialysis needs  Hyperkalemia addressed with HD today     Hypertension  Ischemic cardiomyopathy status post AICD (left chest wall)  GERD  CAD  History of TYLOR  Holding home BP meds due to borderline blood pressure with lactic acidosis  Continue home PPI  Continue home aspirin, holding metoprolol as well follow BP, holding statin due to elevated LFTs  Follow platelet/CBC daily     Chronic pain syndrome  Oxycodone 5 mg q 4hrs as needed  Avoid Tylenol due to elevated LFTs  DC Fentanyl      Code Status: Full code  Surrogate Decision Maker: Sister     DVT Prophylaxis: Coumadin as above  GI Prophylaxis: PPI as at home     Baseline: Patient drives himself to dialysis units Monday Wednesday Friday from home, independent with ADLs, lives with sister      18.5 - 24.9 Normal weight / Body mass index is 22.31 kg/m². Estimated discharge date: Nov 30, 2021  Barriers: COVID-19, Improvement of transaminitis         Subjective:     Chief Complaint / Reason for Physician Visit  Patient seen and examined at the bedside, currently undergoing HD. He reports having a difficult night with LE swelling / pain and shortness of breath. Still with some abdominal pain, but improved. Discussed with RN events overnight. Review of Systems:  Symptom Y/N Comments  Symptom Y/N Comments   Fever/Chills    Chest Pain     Poor Appetite    Edema     Cough    Abdominal Pain     Sputum    Joint Pain     SOB/ZAMUDIO    Pruritis/Rash     Nausea/vomit    Tolerating PT/OT     Diarrhea    Tolerating Diet     Constipation    Other       Could NOT obtain due to:      Objective:     VITALS:   Last 24hrs VS reviewed since prior progress note.  Most recent are:  Patient Vitals for the past 24 hrs:   Temp Pulse Resp BP SpO2   11/29/21 1230 97.5 °F (36.4 °C) 72 12 119/69 97 %   11/29/21 1215  71 13 121/81    11/29/21 1200  78 10 120/74 100 %   11/29/21 1145  73 15 116/71 99 %   11/29/21 1130  69 9 111/68 95 %   11/29/21 1115  72 14 117/69 95 %   11/29/21 1100  76 18 122/76 97 %   11/29/21 1045  77 24 114/69    11/29/21 1030  72 18 106/63 97 %   11/29/21 1015  73 12 114/70 96 %   11/29/21 1000  73 13 119/69 96 %   11/29/21 0945  73 13 112/73 97 %   11/29/21 0930  75 17 116/70 93 %   11/29/21 0915  73 21 121/79 99 %   11/29/21 0900 97.6 °F (36.4 °C) 74 12 120/75 99 %   11/29/21 0817 98.2 °F (36.8 °C) 74 12 120/77 98 %   11/29/21 0425 98 °F (36.7 °C) (!) 101 11 113/77 95 %   11/29/21 0424 98 °F (36.7 °C)       11/28/21 2241 97.4 °F (36.3 °C)       11/28/21 2230 97.4 °F (36.3 °C) 76 17 122/81 100 %   11/28/21 1951 97.4 °F (36.3 °C) 73 16 111/71 95 %   11/28/21 1457 97.4 °F (36.3 °C) 74 15 106/76 97 %   11/28/21 1414  74   100 %   11/28/21 1401     100 %   11/28/21 1352  72   100 %       Intake/Output Summary (Last 24 hours) at 11/29/2021 1342  Last data filed at 11/29/2021 1230  Gross per 24 hour   Intake 240 ml   Output 2000 ml   Net -1760 ml        I had a face to face encounter and independently examined this patient on 11/29/2021, as outlined below:  PHYSICAL EXAM:    General:          Alert, cooperative, no distress, appears stated age. HEENT:           Atraumatic, anicteric sclerae, pink conjunctivae                          No oral ulcers, mucosa moist, throat clear, dentition fair  Neck:               Supple, symmetrical,  thyroid: non tender  Lungs:             Clear to auscultation bilaterally. No Wheezing or Rhonchi. No rales. Chest wall:      No tenderness  No Accessory muscle use. Left chest wall AICD noted  Heart:              Regular  rhythm,  No  murmur   2+ LE edema bilaterally  Abdomen:        Soft, non-tender. Not distended. Bowel sounds normal  Extremities:     No cyanosis. No clubbing, left arm AV fistula noted with good thrill                          Skin turgor normal, Capillary refill normal, Radial dial pulse 2+  Skin:                Not pale. Not Jaundiced  No rashes   Psych:             Good insight. Not depressed. Not anxious or agitated. Neurologic:      EOMs intact. No facial asymmetry. No aphasia or slurred speech. Symmetrical strength, Sensation grossly intact. Alert and oriented X 4.      Reviewed most current lab test results and cultures  YES  Reviewed most current radiology test results   YES  Review and summation of old records today    NO  Reviewed patient's current orders and MAR    YES  PMH/SH reviewed - no change compared to H&P  ________________________________________________________________________  Care Plan discussed with:    Comments   Patient     Family      RN     Care Manager     Consultant                        Multidiciplinary team rounds were held today with , nursing, pharmacist and clinical coordinator. Patient's plan of care was discussed; medications were reviewed and discharge planning was addressed. ________________________________________________________________________  Total NON critical care TIME:  31   Minutes    Total CRITICAL CARE TIME Spent:   Minutes non procedure based      Comments   >50% of visit spent in counseling and coordination of care     ________________________________________________________________________  Konrad Duran MD     Procedures: see electronic medical records for all procedures/Xrays and details which were not copied into this note but were reviewed prior to creation of Plan. LABS:  I reviewed today's most current labs and imaging studies.   Pertinent labs include:  Recent Labs     11/29/21  0307 11/28/21  0140 11/27/21  1405   WBC 10.9 5.7 7.2   HGB 8.6* 8.4* 7.6*   HCT 27.8* 27.1* 23.9*   * 92* 84*     Recent Labs     11/29/21  0307 11/28/21  0140 11/27/21  1520 11/27/21  1514   * 130* 134*  --    K 6.0* 5.5* 4.4  --    CL 94* 93* 94*  --    CO2 16* 19* 25  --    GLU 89 85 60*  --    BUN 61* 34* 28*  --    CREA 8.39* 6.62* 6.53*  --    CA 8.1* 8.4* 8.3*  --    MG 2.6*  --   --   --    PHOS 8.0*  --   --   --    ALB 3.1*  --  3.6  --    TBILI 1.8*  --  0.7  --    *  --  321*  --    INR 2.3* 1.7*  --  1.7*       Signed: Konrad Druan MD

## 2021-11-29 NOTE — PROGRESS NOTES
0700: Bedside shift report received from hospitals. Report consisted of SBAR, Kardex, MAR, Recent Results, Intake/Output, and cardiac rhythm. 0830: Lactic acid sent to lab for processing. 0900: Lab called with critical lactic acid of 6.7. Notified Dr. Jonell Duverney. Will redraw lactic acid in 6 hours. 1300: Patient dialysis completed. 1530: Lactic acid sent to lab.     1634: Lab called with lactic acid result of 3.2. Dr. Jonell Duverney notified. End of Shift Note    Bedside shift change report given to Helder Samuels RN (oncoming nurse) by Corinne Huguenin, RN (offgoing nurse). Report included the following information SBAR, Kardex, Intake/Output, MAR and Cardiac Rhythm sinus rhythm    Shift worked:  5094-0072     Shift summary and any significant changes:     Patient received HD today; 2L pulled off patient. Lactic acid continues to be elevated. MD aware. Ultrasound of ABD completed. Concerns for physician to address:       Zone phone for oncoming shift:         Activity:  Activity Level: Up with Assistance  Number times ambulated in hallways past shift: 0  Number of times OOB to chair past shift: 0    Cardiac:   Cardiac Monitoring: Yes      Cardiac Rhythm: Sinus Rhythm    Access:   Current line(s): PIV     Genitourinary:   Urinary status: anuric    Respiratory:   O2 Device: None (Room air)  Chronic home O2 use?: NO  Incentive spirometer at bedside: NO     GI:  Last Bowel Movement Date:  (PTA)  Current diet:  ADULT DIET Regular; Low Fat/Low Chol/High Fiber/2 gm Na; Low Potassium (Less than 3000 mg/day); Low Phosphorus (Less than 1000 mg)  Passing flatus: YES  Tolerating current diet: YES       Pain Management:   Patient states pain is manageable on current regimen: YES    Skin:  Yong Score: 21  Interventions: increase time out of bed, PT/OT consult and limit briefs    Patient Safety:  Fall Score:  Total Score: 4  Interventions: bed/chair alarm, assistive device (walker, cane, etc), gripper socks, pt to call before getting OOB and stay with me (per policy)  High Fall Risk: Yes    Length of Stay:  Expected LOS: - - -  Actual LOS: 2      Nader Sams RN                          .

## 2021-11-29 NOTE — PROGRESS NOTES
Problem: Airway Clearance - Ineffective  Goal: Achieve or maintain patent airway  Outcome: Progressing Towards Goal     Problem: Breathing Pattern - Ineffective  Goal: Ability to achieve and maintain a regular respiratory rate  Outcome: Progressing Towards Goal     Problem: Isolation Precautions - Risk of Spread of Infection  Goal: Prevent transmission of infectious organism to others  Outcome: Progressing Towards Goal     Problem: Nutrition Deficits  Goal: Optimize nutrtional status  Outcome: Progressing Towards Goal     Problem: Risk for Fluid Volume Deficit  Goal: Maintain normal heart rhythm  Outcome: Progressing Towards Goal     Problem: Falls - Risk of  Goal: *Absence of Falls  Description: Document [place] Fall Risk and appropriate interventions in the flowsheet.   Outcome: Progressing Towards Goal  Note: Fall Risk Interventions:  Mobility Interventions: Bed/chair exit alarm         Medication Interventions: Assess postural VS orthostatic hypotension, Bed/chair exit alarm, Patient to call before getting OOB    Elimination Interventions: Bed/chair exit alarm, Call light in reach, Patient to call for help with toileting needs    History of Falls Interventions: Bed/chair exit alarm

## 2021-11-30 LAB
ALBUMIN SERPL-MCNC: 2.9 G/DL (ref 3.5–5)
ALBUMIN/GLOB SERPL: 0.6 {RATIO} (ref 1.1–2.2)
ALP SERPL-CCNC: 233 U/L (ref 45–117)
ALT SERPL-CCNC: 780 U/L (ref 12–78)
ANION GAP SERPL CALC-SCNC: 14 MMOL/L (ref 5–15)
AST SERPL-CCNC: 1466 U/L (ref 15–37)
BASOPHILS # BLD: 0 K/UL (ref 0–0.1)
BASOPHILS NFR BLD: 0 % (ref 0–1)
BILIRUB SERPL-MCNC: 1 MG/DL (ref 0.2–1)
BUN SERPL-MCNC: 46 MG/DL (ref 6–20)
BUN/CREAT SERPL: 8 (ref 12–20)
CALCIUM SERPL-MCNC: 8 MG/DL (ref 8.5–10.1)
CHLORIDE SERPL-SCNC: 98 MMOL/L (ref 97–108)
CO2 SERPL-SCNC: 23 MMOL/L (ref 21–32)
CREAT SERPL-MCNC: 5.71 MG/DL (ref 0.7–1.3)
CRP SERPL-MCNC: 4.97 MG/DL (ref 0–0.6)
CRP SERPL-MCNC: 5.42 MG/DL (ref 0–0.6)
D DIMER PPP FEU-MCNC: 13.2 MG/L FEU (ref 0–0.65)
DIFFERENTIAL METHOD BLD: ABNORMAL
EOSINOPHIL # BLD: 0 K/UL (ref 0–0.4)
EOSINOPHIL NFR BLD: 0 % (ref 0–7)
ERYTHROCYTE [DISTWIDTH] IN BLOOD BY AUTOMATED COUNT: 19.6 % (ref 11.5–14.5)
GLOBULIN SER CALC-MCNC: 4.6 G/DL (ref 2–4)
GLUCOSE SERPL-MCNC: 160 MG/DL (ref 65–100)
HCT VFR BLD AUTO: 27.2 % (ref 36.6–50.3)
HGB BLD-MCNC: 8.8 G/DL (ref 12.1–17)
IMM GRANULOCYTES # BLD AUTO: 0.1 K/UL (ref 0–0.04)
IMM GRANULOCYTES NFR BLD AUTO: 1 % (ref 0–0.5)
INR PPP: 2.3 (ref 0.9–1.1)
LACTATE SERPL-SCNC: 2.3 MMOL/L (ref 0.4–2)
LACTATE SERPL-SCNC: 2.6 MMOL/L (ref 0.4–2)
LACTATE SERPL-SCNC: 2.7 MMOL/L (ref 0.4–2)
LACTATE SERPL-SCNC: 2.7 MMOL/L (ref 0.4–2)
LACTATE SERPL-SCNC: 3.6 MMOL/L (ref 0.4–2)
LYMPHOCYTES # BLD: 0 K/UL (ref 0.8–3.5)
LYMPHOCYTES NFR BLD: 0 % (ref 12–49)
MAGNESIUM SERPL-MCNC: 2.4 MG/DL (ref 1.6–2.4)
MCH RBC QN AUTO: 30.6 PG (ref 26–34)
MCHC RBC AUTO-ENTMCNC: 32.4 G/DL (ref 30–36.5)
MCV RBC AUTO: 94.4 FL (ref 80–99)
MONOCYTES # BLD: 0.5 K/UL (ref 0–1)
MONOCYTES NFR BLD: 4 % (ref 5–13)
NEUTS SEG # BLD: 11.7 K/UL (ref 1.8–8)
NEUTS SEG NFR BLD: 95 % (ref 32–75)
NRBC # BLD: 1.16 K/UL (ref 0–0.01)
NRBC BLD-RTO: 9.4 PER 100 WBC
PHOSPHATE SERPL-MCNC: 5.8 MG/DL (ref 2.6–4.7)
PLATELET # BLD AUTO: 84 K/UL (ref 150–400)
PMV BLD AUTO: 12.8 FL (ref 8.9–12.9)
POTASSIUM SERPL-SCNC: 4.9 MMOL/L (ref 3.5–5.1)
PROT SERPL-MCNC: 7.5 G/DL (ref 6.4–8.2)
PROTHROMBIN TIME: 22.8 SEC (ref 9–11.1)
RBC # BLD AUTO: 2.88 M/UL (ref 4.1–5.7)
RBC MORPH BLD: ABNORMAL
RBC MORPH BLD: ABNORMAL
SODIUM SERPL-SCNC: 135 MMOL/L (ref 136–145)
TROPONIN-HIGH SENSITIVITY: 251 NG/L (ref 0–76)
TROPONIN-HIGH SENSITIVITY: 282 NG/L (ref 0–76)
WBC # BLD AUTO: 12.3 K/UL (ref 4.1–11.1)

## 2021-11-30 PROCEDURE — 74011250636 HC RX REV CODE- 250/636: Performed by: STUDENT IN AN ORGANIZED HEALTH CARE EDUCATION/TRAINING PROGRAM

## 2021-11-30 PROCEDURE — 74011250637 HC RX REV CODE- 250/637: Performed by: STUDENT IN AN ORGANIZED HEALTH CARE EDUCATION/TRAINING PROGRAM

## 2021-11-30 PROCEDURE — 77030040133 HC BLANKET THRM DISP CSZ -A

## 2021-11-30 PROCEDURE — 86140 C-REACTIVE PROTEIN: CPT

## 2021-11-30 PROCEDURE — 74011250637 HC RX REV CODE- 250/637: Performed by: INTERNAL MEDICINE

## 2021-11-30 PROCEDURE — 97530 THERAPEUTIC ACTIVITIES: CPT | Performed by: OCCUPATIONAL THERAPIST

## 2021-11-30 PROCEDURE — 74011000250 HC RX REV CODE- 250: Performed by: STUDENT IN AN ORGANIZED HEALTH CARE EDUCATION/TRAINING PROGRAM

## 2021-11-30 PROCEDURE — 83605 ASSAY OF LACTIC ACID: CPT

## 2021-11-30 PROCEDURE — 85610 PROTHROMBIN TIME: CPT

## 2021-11-30 PROCEDURE — 84484 ASSAY OF TROPONIN QUANT: CPT

## 2021-11-30 PROCEDURE — 83735 ASSAY OF MAGNESIUM: CPT

## 2021-11-30 PROCEDURE — 85379 FIBRIN DEGRADATION QUANT: CPT

## 2021-11-30 PROCEDURE — 65270000029 HC RM PRIVATE

## 2021-11-30 PROCEDURE — 74011250636 HC RX REV CODE- 250/636: Performed by: INTERNAL MEDICINE

## 2021-11-30 PROCEDURE — 80053 COMPREHEN METABOLIC PANEL: CPT

## 2021-11-30 PROCEDURE — 97116 GAIT TRAINING THERAPY: CPT

## 2021-11-30 PROCEDURE — 85025 COMPLETE CBC W/AUTO DIFF WBC: CPT

## 2021-11-30 PROCEDURE — 84100 ASSAY OF PHOSPHORUS: CPT

## 2021-11-30 PROCEDURE — 93005 ELECTROCARDIOGRAM TRACING: CPT

## 2021-11-30 PROCEDURE — 97162 PT EVAL MOD COMPLEX 30 MIN: CPT

## 2021-11-30 PROCEDURE — 36415 COLL VENOUS BLD VENIPUNCTURE: CPT

## 2021-11-30 PROCEDURE — 97535 SELF CARE MNGMENT TRAINING: CPT | Performed by: OCCUPATIONAL THERAPIST

## 2021-11-30 RX ORDER — NITROGLYCERIN 0.4 MG/1
TABLET SUBLINGUAL
Status: DISPENSED
Start: 2021-11-30 | End: 2021-12-01

## 2021-11-30 RX ORDER — CALCIUM CARBONATE 200(500)MG
200 TABLET,CHEWABLE ORAL
Status: DISCONTINUED | OUTPATIENT
Start: 2021-11-30 | End: 2021-12-08

## 2021-11-30 RX ORDER — ONDANSETRON 2 MG/ML
4 INJECTION INTRAMUSCULAR; INTRAVENOUS ONCE
Status: COMPLETED | OUTPATIENT
Start: 2021-11-30 | End: 2021-11-30

## 2021-11-30 RX ORDER — MORPHINE SULFATE 2 MG/ML
2 INJECTION, SOLUTION INTRAMUSCULAR; INTRAVENOUS ONCE
Status: DISCONTINUED | OUTPATIENT
Start: 2021-11-30 | End: 2021-11-30

## 2021-11-30 RX ORDER — NITROGLYCERIN 0.4 MG/1
0.4 TABLET SUBLINGUAL AS NEEDED
Status: DISCONTINUED | OUTPATIENT
Start: 2021-11-30 | End: 2021-12-07

## 2021-11-30 RX ORDER — FENTANYL CITRATE 50 UG/ML
50 INJECTION, SOLUTION INTRAMUSCULAR; INTRAVENOUS ONCE
Status: COMPLETED | OUTPATIENT
Start: 2021-11-30 | End: 2021-11-30

## 2021-11-30 RX ORDER — MORPHINE SULFATE 2 MG/ML
2 INJECTION, SOLUTION INTRAMUSCULAR; INTRAVENOUS ONCE
Status: COMPLETED | OUTPATIENT
Start: 2021-11-30 | End: 2021-11-30

## 2021-11-30 RX ORDER — WARFARIN 2.5 MG/1
2.5 TABLET ORAL ONCE
Status: COMPLETED | OUTPATIENT
Start: 2021-11-30 | End: 2021-11-30

## 2021-11-30 RX ORDER — FAMOTIDINE 20 MG/1
20 TABLET, FILM COATED ORAL DAILY
Status: DISCONTINUED | OUTPATIENT
Start: 2021-11-30 | End: 2021-12-06

## 2021-11-30 RX ADMIN — Medication 10 ML: at 22:15

## 2021-11-30 RX ADMIN — CALCIUM CARBONATE (ANTACID) CHEW TAB 500 MG 200 MG: 500 CHEW TAB at 17:46

## 2021-11-30 RX ADMIN — CALCIUM ACETATE 1334 MG: 667 CAPSULE ORAL at 11:48

## 2021-11-30 RX ADMIN — MORPHINE SULFATE 2 MG: 2 INJECTION, SOLUTION INTRAMUSCULAR; INTRAVENOUS at 16:39

## 2021-11-30 RX ADMIN — ONDANSETRON 4 MG: 2 INJECTION INTRAMUSCULAR; INTRAVENOUS at 22:15

## 2021-11-30 RX ADMIN — OXYCODONE 5 MG: 5 TABLET ORAL at 20:20

## 2021-11-30 RX ADMIN — OXYCODONE 5 MG: 5 TABLET ORAL at 00:18

## 2021-11-30 RX ADMIN — PANTOPRAZOLE SODIUM 40 MG: 40 TABLET, DELAYED RELEASE ORAL at 08:39

## 2021-11-30 RX ADMIN — ZINC SULFATE 220 MG (50 MG) CAPSULE 1 CAPSULE: CAPSULE at 08:40

## 2021-11-30 RX ADMIN — Medication 1 CAPSULE: at 08:39

## 2021-11-30 RX ADMIN — Medication 10 ML: at 05:55

## 2021-11-30 RX ADMIN — WARFARIN SODIUM 2.5 MG: 2.5 TABLET ORAL at 17:46

## 2021-11-30 RX ADMIN — CALCITRIOL CAPSULES 0.25 MCG 0.5 MCG: 0.25 CAPSULE ORAL at 08:40

## 2021-11-30 RX ADMIN — OXYCODONE HYDROCHLORIDE AND ACETAMINOPHEN 500 MG: 500 TABLET ORAL at 08:39

## 2021-11-30 RX ADMIN — CALCIUM ACETATE 1334 MG: 667 CAPSULE ORAL at 17:46

## 2021-11-30 RX ADMIN — FAMOTIDINE 20 MG: 20 TABLET, FILM COATED ORAL at 14:28

## 2021-11-30 RX ADMIN — FENTANYL CITRATE 50 MCG: 50 INJECTION, SOLUTION INTRAMUSCULAR; INTRAVENOUS at 14:28

## 2021-11-30 RX ADMIN — ASPIRIN 81 MG CHEWABLE TABLET 81 MG: 81 TABLET CHEWABLE at 08:39

## 2021-11-30 RX ADMIN — LIDOCAINE HYDROCHLORIDE 40 ML: 20 SOLUTION ORAL; TOPICAL at 22:15

## 2021-11-30 RX ADMIN — CALCIUM ACETATE 1334 MG: 667 CAPSULE ORAL at 08:39

## 2021-11-30 RX ADMIN — NITROGLYCERIN 0.4 MG: 0.4 TABLET, ORALLY DISINTEGRATING SUBLINGUAL at 16:18

## 2021-11-30 RX ADMIN — CALCIUM CARBONATE (ANTACID) CHEW TAB 500 MG 200 MG: 500 CHEW TAB at 12:08

## 2021-11-30 RX ADMIN — GUAIFENESIN 600 MG: 600 TABLET, EXTENDED RELEASE ORAL at 08:39

## 2021-11-30 RX ADMIN — Medication 10 ML: at 14:31

## 2021-11-30 RX ADMIN — GUAIFENESIN 600 MG: 600 TABLET, EXTENDED RELEASE ORAL at 20:18

## 2021-11-30 RX ADMIN — DEXAMETHASONE 6 MG: 4 TABLET ORAL at 08:39

## 2021-11-30 RX ADMIN — OXYCODONE 5 MG: 5 TABLET ORAL at 11:58

## 2021-11-30 NOTE — PROGRESS NOTES
Problem: Mobility Impaired (Adult and Pediatric)  Goal: *Acute Goals and Plan of Care (Insert Text)  Description: FUNCTIONAL STATUS PRIOR TO ADMISSION: Patient was independent and active without use of DME.    HOME SUPPORT PRIOR TO ADMISSION: The patient lived with sister. Physical Therapy Goals  Initiated 11/30/2021  1. Patient will move from supine to sit and sit to supine  in bed with independence within 7 day(s). 2.  Patient will transfer from bed to chair and chair to bed with modified independence using the least restrictive device within 7 day(s). 3.  Patient will perform sit to stand with modified independence within 7 day(s). 4.  Patient will ambulate with modified independence for 100 feet with the least restrictive device within 7 day(s). Outcome: Not Met     PHYSICAL THERAPY EVALUATION  Patient: Saturnino Day (89 y.o. male)  Date: 11/30/2021  Primary Diagnosis: Acute COVID-19 [U07.1]        Precautions:   Contact, Fall, Skin (COVID)      ASSESSMENT  Based on the objective data described below, the patient presents with generalized weakness, self limiting behavior, slightly impaired balance and mildly impaired gait. Pt was received in in supine on room air and cleared by nursing to mobilize. When asked to perform any task pt immediately stated he could not perform the task prior to trying it. Pt was able to sit on the edge of the bed without assistance. Stood with walker with no physical assistance. Pt initially would not even attempt to perform his own mayra hygiene, but when strongly encouraged he did not have any difficulty performing this in standing, no loss of balance noted. He was able to ambulated to the chair without loss of balance. He was left sitting up for lunch. Pt will progress well if he is willing to perform daily activities he needs to do such as be OOB for all meals daily and ambulate to the BSC/bathroom with nursing.  He also will needs significant self motivation to progress. Vitals stable during session on room air. Current Level of Function Impacting Discharge (mobility/balance): CGA/SBA    Functional Outcome Measure: The patient scored Total: 45/100 on the Barthel Index outcome measure which is indicative of being intact in basic self-care. Other factors to consider for discharge: very self limiting     Patient will benefit from skilled therapy intervention to address the above noted impairments. PLAN :  Recommendations and Planned Interventions: bed mobility training, transfer training, gait training, therapeutic exercises, patient and family training/education, and therapeutic activities      Frequency/Duration: Patient will be followed by physical therapy:  3 times a week to address goals. Recommendation for discharge: (in order for the patient to meet his/her long term goals)  Physical therapy at least 2 days/week in the home AND ensure assist and/or supervision for safety with mobility    This discharge recommendation:  Has not yet been discussed the attending provider and/or case management    IF patient discharges home will need the following DME: rolling walker         SUBJECTIVE:   Patient stated you need to call the nurse in here to clean me.  after he had already cleaned himself    OBJECTIVE DATA SUMMARY:   HISTORY:    Past Medical History:   Diagnosis Date    Abdominal hematoma     AICD (automatic cardioverter/defibrillator) present     CAD (coronary artery disease)     anterior MI s/p 2 stents  2007    Chronic abdominal pain     Chronic kidney disease     ESRD; Dialysis dependent.  Home [facility] does labs- [facility] tpke    DVT (deep venous thrombosis) ([address] 2001    DVT of popliteal vein ([address] 11/2011    not anticoagulated due to bleeding, IVC filter    Endocarditis     Gallstone pancreatitis     Gastrointestinal disorder     acid reflux    Gastrointestinal disorder     peptic ulcer    Hemodialysis patient ([address]     High cholesterol Hypertension     Kidney transplant     b/l kidneys 1995, 2001    Long term current use of anticoagulant therapy     Nephrotic syndrome     Other ill-defined conditions(799.89)     kidny transplant x2,  on dialysis    Other ill-defined conditions(799.89)     home dialysis    Other ill-defined conditions(799.89)     hx recurrent left leg DVT    Peritonitis ([facility]     Seizures ([facility] 2015    most recent- only had three in lifetime    Small bowel obstruction ([facility])     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    V-tach ([facility]      Past Surgical History:   Procedure Laterality Date    HX APPENDECTOMY      HX CHOLECYSTECTOMY      HX OTHER SURGICAL  11/2011    exploratory laparotomy    HX OTHER SURGICAL      fem-pop    HX OTHER SURGICAL  02/2013    right upper extremity fistula    HX PACEMAKER Left 6/2009    AICD-st latonya-not connected    HX PACEMAKER Left     AICD-left side-BS-working    HX SMALL BOWEL RESECTION      HX TRANSPLANT  2001     Kidney    HX TRANSPLANT  1992    kidney    HX VASCULAR ACCESS Right     femoral access for HD- does 5 day dialysis @ home    IR REMOVE TUNL CVAD W/O PORT / PUMP  10/29/2020    IR REPLACE CVC TUNNELED W/O PORT  9/10/2020    CT CARDIAC SURG PROCEDURE UNLIST  2007    2 stents    CT EDG US EXAM SURGICAL ALTER STOM DUODENUM/JEJUNUM  7/15/2011         CT ESOPHAGOGASTRODUODENOSCOPY TRANSORAL DIAGNOSTIC  5/24/2012         VASCULAR SURGERY PROCEDURE UNLIST  11/14/2017    Insertion AV graft right upper arm (bovine); ligation of AV fistula right arm       Personal factors and/or comorbidities impacting plan of care:     Home Situation  Home Environment: Private residence  # Steps to Enter: 4  Rails to Enter: Yes  Hand Rails : Right  One/Two Story Residence: Two story  # of Interior Steps: 21  Interior Rails: Both  Living Alone: No  Support Systems: Other Family Member(s) (Pt lives with his sister in a duplex house has 20 steps to enter )  Patient Expects to be Discharged to[de-identified] House  Current DME Used/Available at Home: None  Tub or Shower Type: Tub/Shower combination    EXAMINATION/PRESENTATION/DECISION MAKING:   Critical Behavior:  Neurologic State: Alert  Orientation Level: Oriented X4  Cognition: Appropriate decision making, Follows commands  Safety/Judgement: Awareness of environment, Insight into deficits  Hearing: Auditory  Auditory Impairment: None  Skin:  intact  Edema: none     Functional Mobility:  Bed Mobility:  Rolling: Supervision  Supine to Sit: Supervision     Scooting: Supervision  Transfers:  Sit to Stand: Contact guard assistance  Stand to Sit: Contact guard assistance                       Balance:   Sitting: Intact  Standing: Intact; With support  Ambulation/Gait Training:  Distance (ft): 5 Feet (ft)  Assistive Device: Gait belt; Walker, rolling  Ambulation - Level of Assistance: Contact guard assistance        Gait Abnormalities: Decreased step clearance        Base of Support: Narrowed     Speed/Bernarda: Pace decreased (<100 feet/min)  Step Length: Left shortened; Right shortened                  Functional Measure:  Barthel Index:    Bathin  Bladder: 10  Bowels: 5  Groomin  Dressin  Feeding: 10  Mobility: 0  Stairs: 0  Toilet Use: 5  Transfer (Bed to Chair and Back): 10  Total: 45/100       The Barthel ADL Index: Guidelines  1. The index should be used as a record of what a patient does, not as a record of what a patient could do. 2. The main aim is to establish degree of independence from any help, physical or verbal, however minor and for whatever reason. 3. The need for supervision renders the patient not independent. 4. A patient's performance should be established using the best available evidence. Asking the patient, friends/relatives and nurses are the usual sources, but direct observation and common sense are also important. However direct testing is not needed.   5. Usually the patient's performance over the preceding 24-48 hours is important, but occasionally longer periods will be relevant. 6. Middle categories imply that the patient supplies over 50 per cent of the effort. 7. Use of aids to be independent is allowed. Score Interpretation (from [address])    Independent   60-79 Minimally independent   40-59 Partially dependent   20-39 Very dependent   <20 Totally dependent     -Edyta Harden., Barthel, D.W. (1965). Functional evaluation: the Barthel Index. [address] ([address] (1997). The Barthel activities of daily living index: self-reporting versus actual performance in the old (> or = 75 years). Journal of [address] [place] [address] 45(7), [facility], BORISF, Navi Moore., Kee Braun. (1999). Measuring the change in disability after inpatient rehabilitation; comparison of the responsiveness of the Barthel Index and Functional [place] Measure. Journal of Neurology, Neurosurgery, and Psychiatry, 66(4), 221-889. William Neri, N.J.A, GAMAL Payton, & Sid Lugo MDIVYA. (2004) Assessment of post-stroke quality of life in cost-effectiveness studies: The usefulness of the Barthel Index and the EuroQoL-5D.  Quality of Life Research, 15, 404-41        Physical Therapy Evaluation Charge Determination   History Examination Presentation Decision-Making   HIGH Complexity :3+ comorbidities / personal factors will impact the outcome/ POC  MEDIUM Complexity : 3 Standardized tests and measures addressing body structure, function, activity limitation and / or participation in recreation  MEDIUM Complexity : Evolving with changing characteristics  Other outcome measures barthel  MEDIUM      Based on the above components, the patient evaluation is determined to be of the following complexity level: MEDIUM    Pain Rating:  No complaints     Activity Tolerance:   Fair    After treatment patient left in no apparent distress:   Sitting in chair and Call bell within reach    COMMUNICATION/EDUCATION:   The patients plan of care was discussed with: Occupational therapist and Registered nurse. Fall prevention education was provided and the patient/caregiver indicated understanding. and Patient understands intent and goals of therapy, but is neutral about his/her participation.     Thank you for this referral.  Bertin Britton, PT, DPT   Time Calculation: 23 mins

## 2021-11-30 NOTE — PROGRESS NOTES
Problem: Self Care Deficits Care Plan (Adult)  Goal: *Acute Goals and Plan of Care (Insert Text)  Description:   FUNCTIONAL STATUS PRIOR TO ADMISSION: Patient was independent and active without use of DME. Pt reports doing his own home HD.    HOME SUPPORT: The patient lived with sister but did not require assist.    Occupational Therapy Goals  Initiated 11/28/2021  1. Patient will perform grooming standing sinkside with CGA/minimal assist within 7 day(s). 2.  Patient will perform bathing in supported sitting with supervision/set-up within 7 day(s). 3.  Patient will perform lower body dressing with minimal assistance/contact guard assist within 7 day(s). 4.  Patient will perform toilet transfers with minimal assistance/contact guard assist within 7 day(s). 5.  Patient will perform all aspects of toileting with supervision/set-up within 7 day(s). 6.  Patient will participate in upper extremity therapeutic exercise/activities with supervision/set-up for 15 minutes within 7 day(s). 7.  Patient will utilize energy conservation techniques during functional activities with verbal cues within 7 day(s). Outcome: Progressing Towards Goal    OCCUPATIONAL THERAPY TREATMENT  Patient: Letty Main (68 y.o. male)  Date: 11/30/2021  Diagnosis: Acute COVID-19 [U07.1]   <principal problem not specified>       Precautions: Contact, Fall, Skin (COVID)  Chart, occupational therapy assessment, plan of care, and goals were reviewed. ASSESSMENT  Patient making functional progress despite his limited motivation and self limiting behavior. He does continue to be limited by GW, decreased activity tolerance, decreased safety awareness and decreased standing balance. Overall he was supervision for bed mobility, he was CGA for transfers, he performed seated grooming with supervision/setup and he was min A for LB dressing and toileting. Cueing needed for safety during transfers, but patient receptive.  At this time he continues to benefit from acute OT and he will need HHOT and PT after discharge. PLAN :  Patient continues to benefit from skilled intervention to address the above impairments. Continue treatment per established plan of care. to address goals. Recommendation for discharge: (in order for the patient to meet his/her long term goals)  Occupational therapy at least 2 days/week in the home AND ensure assist and/or supervision for safety with ADLs and functional mobility      Equipment recommendations for successful discharge (if) home:Rolling Walker         OBJECTIVE DATA SUMMARY:   Cognitive/Behavioral Status:  Neurologic State: Alert  Orientation Level: Oriented X4  Cognition: Appropriate for age attention/concentration; Follows commands        Safety/Judgement: Decreased awareness of need for safety    Functional Mobility and Transfers for ADLs:  Bed Mobility:  Rolling: Supervision  Supine to Sit: Supervision  Scooting: Supervision    Transfers:  Sit to Stand: Contact guard assistance     Bed to Chair: Contact guard assistance (ambulating with a RW)    Balance:  Sitting: Intact  Standing: Intact; With support    ADL Intervention:  Grooming  Grooming Assistance: Supervision; Set-up  Position Performed: Seated in chair  Washing Face: Supervision; Set-up  Washing Hands: Supervision; Set-up (using hand wipes)  Cues: Tactile cues provided; Verbal cues provided; Visual cues provided    Upper Body Dressing Assistance  Shirt simulation with hospital gown: Supervision; Set-up;  Compensatory technique training (perrformed seated EOB)    Lower Body Dressing Assistance  Socks: Minimum assistance  Leg Crossed Method Used: Yes  Position Performed: Seated edge of bed    Toileting  Toileting Assistance: Minimum assistance (performed standing)  Bowel Hygiene: Minimum assistance  Clothing Management: Minimum assistance  Cues: Verbal cues provided    Cognitive Retraining  Safety/Judgement: Decreased awareness of need for safety      Activity Tolerance:   Fair  VSS on RA    After treatment patient left in no apparent distress:   Sitting in chair and Call bell within reach    COMMUNICATION/COLLABORATION:   The patients plan of care was discussed with: Physical Therapist and Registered Nurse    Sergio Vital, OTR/L  Time Calculation: 23 mins

## 2021-11-30 NOTE — PROGRESS NOTES
Physician Progress Note      PATIENT:               Gabby Carbajal  CSN #:                  [id number]  :                       1975  ADMIT DATE:       2021 11:42 AM  DISCH DATE:  RESPONDING  PROVIDER #:        Shlomo De La O MD          QUERY TEXT:    Patient admitted with Covid 19. Noted documentation of acute respiratory failure in H&P 21. In order to support the diagnosis of acute respiratory failure, please include additional clinical indicators in your documentation. Or please document if the diagnosis of acute respiratory failure has been ruled out after further study. The medical record reflects the following:  Risk Factors: 43 y/o female admitted for Covid 19 on RA for majority of stay with oxygen saturations 92- 100%. pt did receive NC 2lts  2100 overnight and taken off  0853 am, CXR with no acute process    Clinical Indicators:  21 H&P Mild hypoxic respiratory failure POA,  requiring only 2 L/m of oxygen in ED. Oxygen to keep sats are than 92%, taper to off as able   1600  02 93% RA   2100  Pt. placed on NC 2ltr 02 93%   08  NC no longer documented pt. 02 99%   1149  02 97% RA     CXR IMPRESSION: No acute process.     Treatment  admit to step-down, hospitalist consult, CXR, droplet precautions, supplemental oxygen, keep sats >92      Acute Respiratory Failure Clinical Indicators per [employer] [employer] Training Guide and Quick Reference Guide:  pO2 < 60 mmHg or SpO2 (pulse oximetry) < 91% breathing room air  pCO2 > 50 and pH < 7.35  P/F ratio (pO2 / FIO2) < 300  pO2 decrease or pCO2 increase by 10 mmHg from baseline (if known)  Supplemental oxygen of 40% or more  Presence of respiratory distress, tachypnea, dyspnea, shortness of breath, wheezing  Unable to speak in complete sentences  Use of accessory muscles to breathe  Extreme anxiety and feeling of impending doom  Tripod position  Confusion/altered mental status/obtunded  Options provided:  -- Acute Respiratory Failure as evidenced by, Please document evidence. -- Acute Respiratory Failure ruled out after study  -- Other - I will add my own diagnosis  -- Disagree - Not applicable / Not valid  -- Disagree - Clinically unable to determine / Unknown  -- Refer to Clinical Documentation Reviewer    PROVIDER RESPONSE TEXT:    Provider disagreed with this query.   Not hypoxic    Query created by: India Ford on 11/30/2021 3:03 PM      Electronically signed by:  Delfino Estrada MD 11/30/2021 3:43 PM

## 2021-11-30 NOTE — PROGRESS NOTES
End of Shift Note    Bedside shift change report given to Rhett Anguiano (oncoming nurse) by Rayo Palencia (offgoing nurse). Report included the following information SBAR and Kardex    Shift worked:  [id number]     Shift summary and any significant changes:     Patient requesting to work with PT/OT     Concerns for physician to address:       Zone phone for oncoming shift:          Activity:  Activity Level: Up with Assistance  Number times ambulated in hallways past shift: 0  Number of times OOB to chair past shift: 0    Cardiac:   Cardiac Monitoring: Yes      Cardiac Rhythm: Sinus Rhythm    Access:   Current line(s): PIV     Genitourinary:   Urinary status: anuric    Respiratory:   O2 Device: None (Room air)  Chronic home O2 use?: NO  Incentive spirometer at bedside: YES     GI:  Last Bowel Movement Date:  (PTA)  Current diet:  ADULT DIET Regular; Low Fat/Low Chol/High Fiber/2 gm Na; Low Potassium (Less than 3000 mg/day); Low Phosphorus (Less than 1000 mg)  Passing flatus: YES  Tolerating current diet: YES       Pain Management:   Patient states pain is manageable on current regimen: YES    Skin:  Yong Score: 19  Interventions: increase time out of bed and PT/OT consult    Patient Safety:  Fall Score:  Total Score: 4  Interventions: bed/chair alarm, assistive device (walker, cane, etc), gripper socks, pt to call before getting OOB and stay with me (per policy)  High Fall Risk: Yes    Length of Stay:  Expected LOS: - - -  Actual LOS: [address]

## 2021-11-30 NOTE — PROGRESS NOTES
Problem: Airway Clearance - Ineffective  Goal: Achieve or maintain patent airway  Outcome: Progressing Towards Goal     Problem: Gas Exchange - Impaired  Goal: Absence of hypoxia  Outcome: Progressing Towards Goal  Goal: Promote optimal lung function  Outcome: Progressing Towards Goal     Problem: Breathing Pattern - Ineffective  Goal: Ability to achieve and maintain a regular respiratory rate  Outcome: Progressing Towards Goal     Problem:  Body Temperature -  Risk of, Imbalanced  Goal: Ability to maintain a body temperature within defined limits  Outcome: Progressing Towards Goal  Goal: Will regain or maintain usual level of consciousness  Outcome: Progressing Towards Goal  Goal: Complications related to the disease process, condition or treatment will be avoided or minimized  Outcome: Progressing Towards Goal     Problem: Isolation Precautions - Risk of Spread of Infection  Goal: Prevent transmission of infectious organism to others  Outcome: Progressing Towards Goal     Problem: Nutrition Deficits  Goal: Optimize nutrtional status  Outcome: Progressing Towards Goal     Problem: Risk for Fluid Volume Deficit  Goal: Maintain normal heart rhythm  Outcome: Progressing Towards Goal  Goal: Maintain absence of muscle cramping  Outcome: Progressing Towards Goal  Goal: Maintain normal serum potassium, sodium, calcium, phosphorus, and pH  Outcome: Progressing Towards Goal     Problem: Loneliness or Risk for Loneliness  Goal: Demonstrate positive use of time alone when socialization is not possible  Outcome: Progressing Towards Goal     Problem: Fatigue  Goal: Verbalize increase energy and improved vitality  Outcome: Not Progressing Towards Goal     Problem: Patient Education: Go to Patient Education Activity  Goal: Patient/Family Education  Outcome: Not Progressing Towards Goal     Problem: Falls - Risk of  Goal: *Absence of Falls  Description: Document Dennie Oak Fall Risk and appropriate interventions in the flowsheet.   Outcome: Progressing Towards Goal  Note: Fall Risk Interventions:  Mobility Interventions: Bed/chair exit alarm, Communicate number of staff needed for ambulation/transfer, Patient to call before getting OOB, OT consult for ADLs, PT Consult for mobility concerns, PT Consult for assist device competence, Strengthening exercises (ROM-active/passive), Utilize walker, cane, or other assistive device         Medication Interventions: Bed/chair exit alarm, Patient to call before getting OOB, Teach patient to arise slowly    Elimination Interventions: Bed/chair exit alarm, Call light in reach, Patient to call for help with toileting needs, Stay With Me (per policy), Toilet paper/wipes in reach, Toileting schedule/hourly rounds    History of Falls Interventions: Bed/chair exit alarm         Problem: Patient Education: Go to Patient Education Activity  Goal: Patient/Family Education  Outcome: Progressing Towards Goal

## 2021-11-30 NOTE — PROGRESS NOTES
0725: Bedside shift change report given to Roberto Monk RN (oncoming nurse) by Sanjuanita Jacques RN (offgoing nurse). Report included the following information SBAR, Kardex, Intake/Output, MAR, Recent Results and Cardiac Rhythm NSr.     1229: Tele called patient had 5 beats of Vtach while working with PT/OT. Patient is asymptomatic. MD notified. 1231Melflora Cao, lab called critical lactic acid 2.7. MD notified. No new orders received. 1158: Patient c/o 7/10 in back, abdomen, and leg. PRN Roxicodone given at this time. 1428: Patient is not alleviated. Pain 8/10. Dr. Rikki Lobato at bedside. One time Fentanyl 50mcg IV given at this time per MD.    1620: Rapid response called d/t chest pain. Chest pain 8/10. Kadi Hoyos, CCU nurse and Dr. Rikki Lobato at bedside. Verbal orders per MD to obtain EKG and to administer 1 SL nitro w/o relief. Chest pain still 7/10. Verbal orders per MD to administer 2mg morphine IV. Unable to obtain labs, PICC team paged and will draw trop, CRP, and lactic acid. Chest pain now 6/10. VSS. 1747: Critical lactic acid: 2.6 and critical troponin: 282. MD notified. 1834: Cardiology consult called. Dr. Yusra Estrada on call.    1900: End of Shift Note    Bedside shift change report given to Sanjuanita Jacques RN (oncoming nurse) by Abundio Geller RN (offgoing nurse). Report included the following information SBAR, Kardex, Intake/Output, MAR, Recent Results and Cardiac Rhythm NSR    Shift worked:  7a-7p     Shift summary and any significant changes:    See notes above   Concerns for physician to address: See notes above    Also, patient states he feels like when he drinks or eats \"its sitting in chest\". Possible SLP consult tomorrow? Zone phone for oncoming shift:  [phone]       Activity:  Activity Level:  Up with Assistance  Number times ambulated in hallways past shift: 0  Number of times OOB to chair past shift: 0    Cardiac:   Cardiac Monitoring: Yes      Cardiac Rhythm: Sinus Rhythm    Access:   Current line(s): PIV and HD access     Genitourinary:   Urinary status: anuric    Respiratory:   O2 Device: None (Room air)  Chronic home O2 use?: NO  Incentive spirometer at bedside: NO     GI:  Last Bowel Movement Date: 11/30/21  Current diet:  ADULT DIET Regular; Low Fat/Low Chol/High Fiber/2 gm Na; Low Potassium (Less than 3000 mg/day); Low Phosphorus (Less than 1000 mg)  Passing flatus: YES  Tolerating current diet: YES       Pain Management:   Patient states pain is manageable on current regimen: NO    Skin:  Yong Score: 19  Interventions: float heels, increase time out of bed and PT/OT consult    Patient Safety:  Fall Score:  Total Score: 4  Interventions: bed/chair alarm, assistive device (walker, cane, etc), gripper socks, pt to call before getting OOB and stay with me (per policy)  High Fall Risk: Yes    Length of Stay:  Expected LOS: 5d 9h  Actual LOS: 3      Mel Altamirano RN

## 2021-11-30 NOTE — PROGRESS NOTES
Hospitalist Progress Note    NAME: Martin Corrales   :  1975   MRN:  [id number]       Assessment / Plan:  Acute Covid 19 infection syndrome POA-onset of symptoms past Wednesday 3 days ago per patient, not vaccinated  Mild hypoxic respiratory failure POA requiring only 2 L/m of oxygen in ED  History of recent Streptococcus intermedius bacteremia for which patient was started on IV vancomycin with dialysis on discharge on 2021  Rapid Covid test positive  Blood culture NGTD     Remain in stepdown unit as patient at high risk for further deterioration due to immunocompromise state having high inflammatory markers with positive rapid COVID-19 test   Oxygen to keep sats are than 92%, taper to off as able  P.o. Decadron 6 mg daily for 10 days  Mucinex twice daily  Vitamin C, zinc  IV antibiotics -IV vancomycin per recent discharge plan for bacteremia last admission + empiric IV cefepime to cover gram-negative sepsis due to severe lactic acidosis. DC cefepime  Droplet plus precaution for COVID-19 infection  Continue anticoagulation with Coumadin (patient had a history of DVT with graft thrombosis history)  Inpatient pharmacy consult for advanced COVID-19 therapy if indicated, patient does not qualify for any additional therapies   Lactate improving  Pulm following  Check CRP in AM    Abdominal pain  CT scan reviewed  Monitor  LFT's worsening, likely due to virus   Continue to trend  Check hepatitis panel  Abdominal US with dopplers with no significant pathology to explain worsening LFTs  Complaining of a lot of GERD.   Add pepcid     ESRD on hemodialysis   Inpatient nephrology consult for dialysis needs       Hypertension  Ischemic cardiomyopathy status post AICD (left chest wall)  GERD  CAD  History of TYLOR  Holding home BP meds due to borderline blood pressure with lactic acidosis  Continue home PPI  Continue home aspirin, holding metoprolol as well follow BP, holding statin due to elevated LFTs  Follow platelet/CBC daily     Chronic pain syndrome  Oxycodone 5 mg q 4hrs as needed  Avoid Tylenol due to elevated LFTs  DC Fentanyl   Heating pad     Code Status: Full code  Surrogate Decision Maker:      DVT Prophylaxis: Coumadin as above  GI Prophylaxis: PPI as at home     Baseline: Patient drives himself to dialysis units Monday Wednesday Friday from home, independent with ADLs, lives with sister      18.5 - 24.9 Normal weight / Body mass index is 22.3 kg/m². Estimated discharge date: Nov 30, 2021  Barriers: COVID-19, Improvement of transaminitis         Subjective:     Chief Complaint / Reason for Physician Visit  Patient seen and examined at the bedside. Patient is complaining of severe bilateral lower extremity weakness. He states that he is usually independent and is able to drive to dialysis and work. However, today he says that he will not be able during this things. He denies any numbness or tingling. He just states that they are very weak. He also continues to have pain in his abdomen and back and legs. He is tolerating a diet. He is complaining of severe GERD at this time. Discussed with RN events overnight. Review of Systems:  Symptom Y/N Comments  Symptom Y/N Comments   Fever/Chills    Chest Pain     Poor Appetite    Edema     Cough    Abdominal Pain     Sputum    Joint Pain     SOB/ZAMUDIO    Pruritis/Rash     Nausea/vomit    Tolerating PT/OT     Diarrhea    Tolerating Diet     Constipation    Other       Could NOT obtain due to:      Objective:     VITALS:   Last 24hrs VS reviewed since prior progress note.  Most recent are:  Patient Vitals for the past 24 hrs:   Temp Pulse Resp BP SpO2   11/30/21 1149 97.5 °F (36.4 °C) 75 16 117/77 97 %   11/30/21 0822 98 °F (36.7 °C) 74 16 106/75 97 %   11/30/21 0354 98 °F (36.7 °C) 73 15 107/76 98 %   11/30/21 0000 98.4 °F (36.9 °C) 81 16 115/79 100 %   11/29/21 1959 98.3 °F (36.8 °C) 76 14 107/72 97 %   11/29/21 1447 97.6 °F (36.4 °C) 79 20 122/79 99 %       Intake/Output Summary (Last 24 hours) at 11/30/2021 1352  Last data filed at 11/30/2021 1317  Gross per 24 hour   Intake 1600 ml   Output    Net 1600 ml        I had a face to face encounter and independently examined this patient on 11/30/2021, as outlined below:  PHYSICAL EXAM:    General:          Alert, cooperative, no distress, appears stated age. HEENT:           Atraumatic, anicteric sclerae, pink conjunctivae                          No oral ulcers, mucosa moist, throat clear, dentition fair  Neck:               Supple, symmetrical,  thyroid: non tender  Lungs:             Clear to auscultation bilaterally. No Wheezing or Rhonchi. No rales. Chest wall:      No tenderness  No Accessory muscle use. Left chest wall AICD noted  Heart:              Regular  rhythm,  No  murmur   2+ LE edema bilaterally  Abdomen:        Soft, non-tender. Not distended. Bowel sounds normal  Extremities:     No cyanosis. No clubbing, left arm AV fistula noted with good thrill                          Skin turgor normal, Capillary refill normal, Radial dial pulse 2+  Skin:                Not pale. Not Jaundiced  No rashes   Psych:             Good insight. Not depressed. Not anxious or agitated. Neurologic:      EOMs intact. No facial asymmetry. No aphasia or slurred speech. Symmetrical strength, Sensation grossly intact. Alert and oriented X 4.      Reviewed most current lab test results and cultures  YES  Reviewed most current radiology test results   YES  Review and summation of old records today    NO  Reviewed patient's current orders and MAR    YES  PMH/SH reviewed - no change compared to H&P  ________________________________________________________________________  Care Plan discussed with:    Comments   Patient     Family      RN     Care Manager     Consultant                        Multidiciplinary team rounds were held today with , nursing, pharmacist and clinical coordinator. Patient's plan of care was discussed; medications were reviewed and discharge planning was addressed. ________________________________________________________________________  Total NON critical care TIME:  31   Minutes    Total CRITICAL CARE TIME Spent:   Minutes non procedure based      Comments   >50% of visit spent in counseling and coordination of care     ________________________________________________________________________  Primo Aguilar MD     Procedures: see electronic medical records for all procedures/Xrays and details which were not copied into this note but were reviewed prior to creation of Plan. LABS:  I reviewed today's most current labs and imaging studies. Pertinent labs include:  Recent Labs     11/30/21  0031 11/29/21  0307 11/28/21  0140   WBC 12.3* 10.9 5.7   HGB 8.8* 8.6* 8.4*   HCT 27.2* 27.8* 27.1*   PLT 84* 100* 92*     Recent Labs     11/30/21  0031 11/29/21  0307 11/28/21  0140 11/27/21  1520 11/27/21  1520   * 132* 130*   < > 134*   K 4.9 6.0* 5.5*   < > 4.4   CL 98 94* 93*   < > 94*   CO2 23 16* 19*   < > 25   * 89 85   < > 60*   BUN 46* 61* 34*   < > 28*   CREA 5.71* 8.39* 6.62*   < > 6.53*   CA 8.0* 8.1* 8.4*   < > 8.3*   MG 2.4 2.6*  --   --   --    PHOS 5.8* 8.0*  --   --   --    ALB 2.9* 3.1*  --   --  3.6   TBILI 1.0 1.8*  --   --  0.7   * 584*  --   --  321*   INR 2.3* 2.3* 1.7*   < >  --     < > = values in this interval not displayed.        Signed: Primo Aguilar MD

## 2021-11-30 NOTE — PROGRESS NOTES
1620: Rapid chest pain called. Dr. Mars Espinosa at the bedside. CCU nurse Ellen Dove at the bedside. Given 1 SL nitro w/o relief. Given verbal order for 2mg morphine IV.  Unable to obtain labs, PICC team paged and coming to bedside to obtain trop, CRP and lactic

## 2021-11-30 NOTE — PROGRESS NOTES
RRT called for chest pain. Patient describes a pressure in the center of his chest has been there for the last 10 minutes. Denies worsening shortness of breath. No radiation. He states that this feels similar to his previous heart attack but not quite as bad. EKG did not show a STEMI. Will obtain troponin. We will have a trial of sublingual nitro. Will consult cardiology given patient's previous history and high risk.

## 2021-11-30 NOTE — PROGRESS NOTES
Name: Elizabeth Simmons: [address]   : 1975 Admit Date: 2021   Phone: [phone]  Room: 2223/01   PCP: Kacey Gallego MD  MRN: [id number]   Date: 2021  Code: Full Code          IMPRESSION  · Acute Respiratory Failure with Hypoxia- now on room air  · Covid-19; CXR currently clear  · Unvaccinated for COVID  · ESRD  · CM s/p ICD placement  · Severe Pulmonary HTN   · CHF LVEF 35- 40%    PLAN  · CRP in AM  · Supplemental oxygen to keep sats >90%  · Continue Dexamethasone; low threshold to increase if he worsens  · Start Baricitinib if oxygen requirements increase  · On Cefepime  · Renal following  · Coumadin dosed per pharmacy  · Trend inflammatory markers  · Encourage IS use, proning  · At high risk for decompensation given his comorbidities, would continue to monitor closely  · COVID vaccine counseling done      21: Lots of heartburn this AM. On Decadron. NOtes and data reviewed. No nausea. Denies cough congestion. Now on room air. Denies shortness of breath at rest.  Need to monitor him closely. Course is unpredictable. If CRP normal in AM, would allow us to be more comfortable if discharged on steroids. If CRP higher, will add Baricitinib     21: Events of weekend, Notes and data reviewed. Patient now starting hemodialysis. Has back and abdominal discomfort. No nausea. Denies cough congestion. Now on room air. Denies shortness of breath at rest.  Explained to the patient the potential of him getting much worse. Need to monitor him closely. Course is unpredictable. Currently on some treatment. Not a candidate for Remdesivir. Chart and notes reviewed. Data reviewed. I review the patient's current medications in the medical record at each encounter.  I have evaluated and examined the patient. 8:40 AM       History was obtained from patient.     I was asked by Jossue Julio MD to see Traci Villafana in consultation for a chief complaint of Covid-19 infection. History of Present Illness:  Mr.Gary will a 43 yo gentleman with a PMH ESRD on HD MWF, hypertension, ischemic cardiomyopathy s/pp ICD placement, and GERD that presented to the ED on 11/27 with worsening fatigue and SOB. Rapid covid test positive. He is unvaccinated against Covid-19. He is currently on RA on my exam today with sats at 96%. Feels a little better. Images:      CXR 11/27: no acute process    Abdominal CT 11/27:  Relatively small right pleural effusion and minimal left pleural effusion, increased since 11/5/2021. Dorene Karst No acute findings in the abdomen or pelvis. .  Renal atrophy and calcified transplant kidneys. Hepatomegaly. No focal lesion. Small amount of ascites. Anasarca. D-dimer 3.15  Lactic acid 8.5  Trop 519  Ferritin 9604  Procalcitonin 26.76  CRP 9.91  Blood cultures negative x 18 hours  ECHO 11/7/21: EF 35-40%, mildly dilated LA, mildly dilated RV, mildly reduced systolic function, trave MVR, moderate TVR. Severe pulmonary hypertension RVSP 85mmHg,     Past Medical History:   Diagnosis Date    Abdominal hematoma     AICD (automatic cardioverter/defibrillator) present     CAD (coronary artery disease)     anterior MI s/p 2 stents  2007    Chronic abdominal pain     Chronic kidney disease     ESRD; Dialysis dependent.  Home [facility] does labs- [facility] tpke    DVT (deep venous thrombosis) ([facility] [address] 2001    DVT of popliteal vein ([address] 11/2011    not anticoagulated due to bleeding, IVC filter    Endocarditis     Gallstone pancreatitis     Gastrointestinal disorder     acid reflux    Gastrointestinal disorder     peptic ulcer    Hemodialysis patient ([address]     High cholesterol     Hypertension     Kidney transplant     b/l kidneys 1995, 2001    Long term current use of anticoagulant therapy     Nephrotic syndrome     Other ill-defined conditions(799.89)     kidny transplant x2,  on dialysis    Other ill-defined conditions(799.89)     home dialysis    Other ill-defined conditions(799.89)     hx recurrent left leg DVT    Peritonitis ([facility] [address]     Seizures ([facility] [address] 2015    most recent- only had three in lifetime    Small bowel obstruction (HCC)     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    [facility])        Past Surgical History:   Procedure Laterality Date    HX APPENDECTOMY      HX CHOLECYSTECTOMY      HX OTHER SURGICAL  11/2011    exploratory laparotomy    HX OTHER SURGICAL      fem-pop    HX OTHER SURGICAL  02/2013    right upper extremity fistula    HX PACEMAKER Left 6/2009    AICD-st latonya-not connected    HX PACEMAKER Left     AICD-left side-BS-working    HX SMALL BOWEL RESECTION      HX TRANSPLANT  2001     Kidney    HX TRANSPLANT  1992    kidney    HX VASCULAR ACCESS Right     femoral access for HD- does 5 day dialysis @ home    IR REMOVE TUNL CVAD W/O PORT / PUMP  10/29/2020    IR REPLACE CVC TUNNELED W/O PORT  9/10/2020    ND CARDIAC SURG PROCEDURE UNLIST  2007    2 stents    ND EDG US EXAM SURGICAL ALTER STOM DUODENUM/JEJUNUM  7/15/2011         ND ESOPHAGOGASTRODUODENOSCOPY TRANSORAL DIAGNOSTIC  5/24/2012         VASCULAR SURGERY PROCEDURE UNLIST  11/14/2017    Insertion AV graft right upper arm (bovine); ligation of AV fistula right arm       Family History   Problem Relation Age of Onset    COPD Mother     Lung Disease Mother     Cancer Father         stomach    Cancer Maternal Grandmother        Social History     Tobacco Use    Smoking status: Never Smoker    Smokeless tobacco: Never Used   Substance Use Topics    Alcohol use: No       Allergies   Allergen Reactions    Shellfish Containing Products Swelling     Break out in rash, trouble breathing    Contrast Agent [Iodine] Shortness of Breath    Heparin Analogues Other (comments)     Positive history of HIT       Current Facility-Administered Medications   Medication Dose Route Frequency    calcium carbonate (TUMS) chewable tablet 200 mg [elemental]  200 mg Oral TID WITH MEALS    aluminum-magnesium hydroxide (MAALOX) oral suspension 15 mL  15 mL Oral QID PRN    sodium chloride (NS) flush 5-10 mL  5-10 mL IntraVENous PRN    acetaminophen (TYLENOL) tablet 650 mg  650 mg Oral Q6H PRN    L.acidophilus-paracasei-S.thermophil-bifidobacter (RISAQUAD) 8 billion cell capsule  1 Capsule Oral DAILY    aspirin chewable tablet 81 mg  81 mg Oral DAILY    calcitRIOL (ROCALTROL) capsule 0.5 mcg  0.5 mcg Oral DAILY    calcium acetate(phosphat bind) (PHOSLO) capsule 1,334 mg  2 Capsule Oral TID WITH MEALS    pantoprazole (PROTONIX) tablet 40 mg  40 mg Oral ACB    sodium chloride (NS) flush 5-40 mL  5-40 mL IntraVENous Q8H    sodium chloride (NS) flush 5-40 mL  5-40 mL IntraVENous PRN    polyethylene glycol (MIRALAX) packet 17 g  17 g Oral DAILY PRN    ondansetron (ZOFRAN ODT) tablet 4 mg  4 mg Oral Q8H PRN    Or    ondansetron (ZOFRAN) injection 4 mg  4 mg IntraVENous Q6H PRN    oxyCODONE IR (ROXICODONE) tablet 5 mg  5 mg Oral Q4H PRN    ascorbic acid (vitamin C) (VITAMIN C) tablet 500 mg  500 mg Oral DAILY    zinc sulfate (ZINCATE) 50 mg zinc (220 mg) capsule 1 Capsule  1 Capsule Oral DAILY    guaiFENesin ER (MUCINEX) tablet 600 mg  600 mg Oral Q12H    albuterol (PROVENTIL HFA, VENTOLIN HFA, PROAIR HFA) inhaler 2 Puff  2 Puff Inhalation Q6H PRN    dexAMETHasone (DECADRON) tablet 6 mg  6 mg Oral DAILY    Warfarin - Pharmacy to Dose   Other Rx Dosing/Monitoring    Vancomycin - Pharmacy to dose   Other Rx Dosing/Monitoring         REVIEW OF SYSTEMS   Negative except as stated in the HPI. Physical Exam:   Visit Vitals  /77   Pulse 75   Temp 97.5 °F (36.4 °C)   Resp 16   Ht 5' 7\" (1.702 m)   Wt 64.6 kg (142 lb 6.4 oz)   SpO2 97%   BMI 22.30 kg/m²       General:  Alert, cooperative, no distress, appears stated age. Head:  Normocephalic, without obvious abnormality, atraumatic. Eyes:  Conjunctivae/corneas clear.     Nose: Nares normal. Septum midline. Mucosa normal.    Throat: Lips, mucosa, and tongue normal. Teeth and gums normal.   Neck: Supple, symmetrical, trachea midline. Lungs:   Clear to auscultation bilaterally. Chest wall:  No tenderness or deformity. Heart:  Regular rate and rhythm, S1, S2 normal, no murmur, click, rub or gallop. Abdomen:   Soft, non-tender. Bowel sounds normal. No masses,  No organomegaly. Extremities: Extremities normal, atraumatic, no cyanosis or edema. Pulses: 2+ and symmetric all extremities. Skin: Skin color, texture, turgor normal. No rashes or lesions   Lymph nodes: Cervical, supraclavicular, and axillary nodes normal.   Neurologic: Grossly nonfocal       Lab Results   Component Value Date/Time    Sodium 135 (L) 11/30/2021 12:31 AM    Potassium 4.9 11/30/2021 12:31 AM    Chloride 98 11/30/2021 12:31 AM    CO2 23 11/30/2021 12:31 AM    BUN 46 (H) 11/30/2021 12:31 AM    Creatinine 5.71 (H) 11/30/2021 12:31 AM    Glucose 160 (H) 11/30/2021 12:31 AM    Calcium 8.0 (L) 11/30/2021 12:31 AM    Magnesium 2.4 11/30/2021 12:31 AM    Phosphorus 5.8 (H) 11/30/2021 12:31 AM    Lactic acid 2.7 (HH) 11/30/2021 11:57 AM       Lab Results   Component Value Date/Time    WBC 12.3 (H) 11/30/2021 12:31 AM    HGB 8.8 (L) 11/30/2021 12:31 AM    PLATELET 84 (L) 03/13/4691 12:31 AM    MCV 94.4 11/30/2021 12:31 AM       Lab Results   Component Value Date/Time    INR 2.3 (H) 11/30/2021 12:31 AM    aPTT 47.0 (H) 11/05/2021 04:34 PM    Alk.  phosphatase 233 (H) 11/30/2021 12:31 AM    Protein, total 7.5 11/30/2021 12:31 AM    Albumin 2.9 (L) 11/30/2021 12:31 AM    Globulin 4.6 (H) 11/30/2021 12:31 AM       Lab Results   Component Value Date/Time    Iron 57 11/22/2018 05:27 AM    TIBC 234 (L) 11/22/2018 05:27 AM    Iron % saturation 24 11/22/2018 05:27 AM    Ferritin 9,604 (H) 11/27/2021 03:14 PM       Lab Results   Component Value Date/Time    C-Reactive protein 5.42 (H) 11/30/2021 12:31 AM    TSH 1.28 09/26/2014 08:32 PM No results found for: PH, PHI, PCO2, PCO2I, PO2, PO2I, HCO3, HCO3I, FIO2, FIO2I    Lab Results   Component Value Date/Time     01/16/2019 07:09 AM    CK-MB Index 2.0 01/16/2019 07:09 AM    Troponin-I, Qt. <0.05 03/29/2021 05:06 AM    BNP 1,946 (H) 09/16/2014 04:17 AM        Lab Results   Component Value Date/Time    Culture result: NO GROWTH 3 DAYS 11/27/2021 02:05 PM    Culture result: NO GROWTH 3 DAYS 11/27/2021 01:40 PM    Culture result: NO GROWTH 5 DAYS 11/07/2021 09:50 AM       Lab Results   Component Value Date/Time    Hepatitis B surface Ag 0.39 11/11/2021 03:15 PM       Lab Results   Component Value Date/Time    Vancomycin,trough 17.7 (H) 12/08/2011 02:59 AM    Vancomycin,trough 19.5 (H) 12/02/2011 02:40 AM     01/16/2019 07:09 AM    CK 62 05/20/2016 06:34 AM       Lab Results   Component Value Date/Time    Color RED 11/16/2012 05:15 PM    Appearance OPAQUE 11/16/2012 05:15 PM    Specific gravity 1.020 11/16/2012 05:15 PM    pH (UA) 8.0 11/16/2012 05:15 PM    Protein >300 (A) 11/16/2012 05:15 PM    Glucose 100 (A) 11/16/2012 05:15 PM    Ketone NEGATIVE  11/16/2012 05:15 PM    Bilirubin NEGATIVE  09/17/2012 01:35 AM    Blood MODERATE (A) 11/16/2012 05:15 PM    Urobilinogen 0.2 11/16/2012 05:15 PM    Nitrites NEGATIVE  11/16/2012 05:15 PM    Leukocyte Esterase NEGATIVE  11/16/2012 05:15 PM    WBC >100 (H) 11/16/2012 05:15 PM    RBC >100 (H) 11/16/2012 05:15 PM    Bacteria 3+ (A) 11/16/2012 05:15 PM     ·     Thanks for the consult.       Rozina Cline MD

## 2021-11-30 NOTE — PROGRESS NOTES
Spiritual Care Assessment/Progress Note  [facility]      NAME: Shilpi Ryan      MRN: [id number]  AGE: 42 y.o. SEX: male  Anabaptism Affiliation: Worship   Language: English     11/30/2021     Total Time (in minutes): 5     Spiritual Assessment begun in [facility] through conversation with:         []Patient        [] Family    [] Friend(s)        Reason for Consult: Rapid response team     Spiritual beliefs: (Please include comment if needed)     [] Identifies with a brown tradition:         [] Supported by a brown community:            [] Claims no spiritual orientation:           [] Seeking spiritual identity:                [] Adheres to an individual form of spirituality:           [x] Not able to assess:                           Identified resources for coping:      [] Prayer                               [] Music                  [] Guided Imagery     [] Family/friends                 [] Pet visits     [] Devotional reading                         [x] Unknown     [] Other:                                               Interventions offered during this visit: (See comments for more details)    Patient Interventions: Crisis           Plan of Care:     [] Support spiritual and/or cultural needs    [] Support AMD and/or advance care planning process      [] Support grieving process   [] Coordinate Rites and/or Rituals    [] Coordination with community clergy   [] No spiritual needs identified at this time   [] Detailed Plan of Care below (See Comments)  [] Make referral to Music Therapy  [] Make referral to Pet Therapy     [] Make referral to Addiction services  [] Make referral to [facility]  [] Make referral to Spiritual Care Partner  [] No future visits requested        [x] Contact Spiritual Care for further referrals     Comments:      responded to Rapid Response Team alert for Mr. Donita Carreon in [phone].  Upon arrival, patient was in medical assessment and intervention. No family was present, unable to assess. Contact  if further needs arise.       [address] [facility] HÉCTOR Zepeda.[place], Juan[address] Provider   Paging Service [phone])

## 2021-11-30 NOTE — PROGRESS NOTES
Responded to rapid response call in room 2223 for chest pain. Patient reporting mid sternal pressure with pain to left side of chest. Patient reporting pain as 8/10. Dr. Ashley Douglas in room with patient and EKG being hooked up to patient. Patient on room air with no noted SOB and VSS at this time. Skin warm and dry to touch. Dr. Ashley Douglas ordered troponin and crp and does not want any other labs at this time. 1614 EKG completed and viewed by Dr. Ashley Douglas. Dr. Ashley Douglas reports no stemi at this time per EKG. 1618 patient lying quietly in bed and still reporting pain as 8/10.  1 sublingual nitro 0.4mg given at this time. Attempted to stick patient for labs and unable to obtain labs at this time. 1623 Pain now 7/10.  1637 Attempted to obtain labs again at this time and unable to obtain specimen at this time. PICC team called to obtain labs. 1639 Pain 7/10. Morphine 2mg IV given per Dr. Ashley Douglas order. 1642 Patient states that he feels some better. Pain 6/10. Patients nurse remains at bedside. Patient VSS. Patient lying quietly in bed. 1830 Follow up from RRT. Patient resting. VSS.

## 2021-11-30 NOTE — PROGRESS NOTES
Pharmacist Daily Dosing of Warfarin    Indication & Goal INR: DVT, INR Goal 2-3    PTA Warfarin Dose: 2.5 mg daily    Notable concurrent conditions and medications: Aspirin    Labs:  Recent Labs     11/30/21  0031 11/29/21  0307 11/29/21  0307 11/28/21  0140 11/28/21  0140 11/27/21  1520 11/27/21  1405   INR 2.3*  --  2.3*  --  1.7*  --    < >   HGB 8.8*   < > 8.6*   < > 8.4*  --    < >   PLT 84*  --  100*  --  92*  --    < >   TBILI 1.0  --  1.8*  --   --  0.7  --    ALB 2.9*   < > 3.1*  --   --  3.6   < >    < > = values in this interval not displayed. Impression/Plan:   Give warfarin 2.5 mg PO x1 tonight  Daily INR has been ordered  CBC w/o differential every other day has been ordered     Pharmacy will follow daily and adjust the dose as appropriate.     Thank you,  Caesar Clark, PHARMD

## 2021-11-30 NOTE — PROGRESS NOTES
Transition of Care Plan:     RUR:23%  Disposition:Home with (PT/OT assessment is pending for final disposition plan)  Follow up appointments:PCP, oral surgeon-Dr Nicole Sanchez, [phone]- scheduled for Dec 23rd @10:30am  DME needed:PT/OT assessment is pending  Transportation at [id number]  [facility] will transport   [address] or means to access home:     Yes   IM Medicare Letter: Will provide upon d/c   Is patient a BCPI-A Bundle: If yes, was Bundle Letter given?:     Caregiver Contact:sisterTheodore [phone]  Discharge Caregiver contacted prior to discharge? No    Reason for Readmission:     Acute Covid 19         RUR Score/Risk Level:   23/ High risk    PCP: First and Last name:  Ruth Blevins   Name of Practice:    Are you a current patient: Yes/No: Yes   Approximate date of last visit: 4 months ago   Can you participate in a virtual visit with your PCP:     Is a Care Conference indicated:  Yes      Did you attend your follow up appointment (s): If not, why not:  No. Id didn't have the appointment, was attending  MWF       Resources/supports as identified by patient/family:  Pt lives with his sister and she  is very helpful. Top Challenges facing patient (as identified by patient/family and CM): Finances/Medication cost?   Pt has medicare and medicaid benefits. Transportation    Sister will transport    Support system or lack thereof? No, pt has good supportive family. Living arrangements? Lives with Sister in a duplex house. Self-care/ADLs/Cognition? Independent with ADLs and IADLs          Current Advanced Directive/Advance Care Plan: Full code             Plan for utilizing home health:   Yes if necessary             Transition of Care Plan:    Based on readmission, the patient's previous Plan of Care Home   has been evaluated and/or modified.  The current Transition of Care Plan is:      Home with [facility]( PT/OT assessment is pending for final disposition plan)    Care Management Interventions  PCP Verified by CM:  Yes  Mode of Transport at Discharge: Self  Transition of Care Consult (CM Consult): Discharge Planning (Pt is on HD MWF at 7 AM )  Discharge Durable Medical Equipment: No (Pt is independent with ADLs and IADLs)  Support Systems: Other Family Member(s) (Pt lives with his sister in a duplex house has 20 steps to enter )  Confirm Follow Up Transport: Family  Discharge Location  Discharge Placement: Home with home health (HD)    Readmission Assessment  Number of days since last admission?: 8-30 days  Previous disposition: Home with Family  Who is being interviewed?: Patient  What was the patient's/caregiver's perception as to why they think they needed to return back to the hospital?: Did not realize care needs would be so extensive  Did you visit your Primary Care Physician after you left the hospital, before you returned this time?: No  Why weren't you able to visit your PCP?: Did not have an appointment  Did you see a specialist, such as Cardiac, Pulmonary, Orthopedic Physician, etc. after you left the hospital?: No  In our efforts to provide the best possible care to you and others like you, can you think of anything that we could have done to help you after you left the hospital the first time, so that you might not have needed to return so soon?: Arrange for more help when leaving the hospital, Additional Community resources available for illness support    Advance Care Planning     General Advance Care Planning (ACP) Conversation      Date of Conversation: 11/27/2021  Conducted with: Patient with Decision Making Capacity    Healthcare Decision Maker:     Primary Decision Maker: Isaiahthomas Bob - [facility] - [phone]  Click here to [address] including selection of the Healthcare Decision Maker Relationship (ie \"Primary\")          Content/Action Overview:   DECLINED ACP conversation - will revisit periodically   Reviewed DNR/DNI and patient elects Full Code (Attempt Resuscitation)    Length of Voluntary ACP Conversation in minutes:  12 minutes    Linsey spoke to pt and completed readmission assessment. Pt is on HD MWF at , at [employer] [facility]. Pt is waiting for  PT/OT assessment for final disposition plan.         Mary Rankin MSW  ED Case Manager   Ext -[phone]

## 2021-11-30 NOTE — PROGRESS NOTES
Problem: Airway Clearance - Ineffective  Goal: Achieve or maintain patent airway  Outcome: Progressing Towards Goal     Problem: Gas Exchange - Impaired  Goal: Absence of hypoxia  Outcome: Progressing Towards Goal  Goal: Promote optimal lung function  Outcome: Progressing Towards Goal     Problem: Breathing Pattern - Ineffective  Goal: Ability to achieve and maintain a regular respiratory rate  Outcome: Progressing Towards Goal     Problem: Body Temperature -  Risk of, Imbalanced  Goal: Ability to maintain a body temperature within defined limits  Outcome: Progressing Towards Goal  Goal: Will regain or maintain usual level of consciousness  Outcome: Progressing Towards Goal  Goal: Complications related to the disease process, condition or treatment will be avoided or minimized  Outcome: Progressing Towards Goal     Problem: Isolation Precautions - Risk of Spread of Infection  Goal: Prevent transmission of infectious organism to others  Outcome: Progressing Towards Goal     Problem: Nutrition Deficits  Goal: Optimize nutrtional status  Outcome: Progressing Towards Goal     Problem: Risk for Fluid Volume Deficit  Goal: Maintain normal heart rhythm  Outcome: Progressing Towards Goal  Goal: Maintain absence of muscle cramping  Outcome: Progressing Towards Goal  Goal: Maintain normal serum potassium, sodium, calcium, phosphorus, and pH  Outcome: Progressing Towards Goal     Problem: Loneliness or Risk for Loneliness  Goal: Demonstrate positive use of time alone when socialization is not possible  Outcome: Progressing Towards Goal     Problem: Fatigue  Goal: Verbalize increase energy and improved vitality  Outcome: Progressing Towards Goal     Problem: Patient Education: Go to Patient Education Activity  Goal: Patient/Family Education  Outcome: Progressing Towards Goal     Problem: Falls - Risk of  Goal: *Absence of Falls  Description: Document [place] Fall Risk and appropriate interventions in the flowsheet.   Outcome: Progressing Towards Goal  Note: Fall Risk Interventions:  Mobility Interventions: Bed/chair exit alarm, Communicate number of staff needed for ambulation/transfer, Patient to call before getting OOB         Medication Interventions: Assess postural VS orthostatic hypotension, Bed/chair exit alarm, Patient to call before getting OOB    Elimination Interventions: Bed/chair exit alarm, Call light in reach, Patient to call for help with toileting needs    History of Falls Interventions: Bed/chair exit alarm, Consult care management for discharge planning, Investigate reason for fall         Problem: Patient Education: Go to Patient Education Activity  Goal: Patient/Family Education  Outcome: Progressing Towards Goal     Problem: Patient Education: Go to Patient Education Activity  Goal: Patient/Family Education  Outcome: Progressing Towards Goal

## 2021-12-01 ENCOUNTER — APPOINTMENT (OUTPATIENT)
Dept: GENERAL RADIOLOGY | Age: 46
DRG: 207 | End: 2021-12-01
Attending: INTERNAL MEDICINE
Payer: MEDICARE

## 2021-12-01 LAB
ALBUMIN SERPL-MCNC: 3.1 G/DL (ref 3.5–5)
ALBUMIN/GLOB SERPL: 0.6 {RATIO} (ref 1.1–2.2)
ALP SERPL-CCNC: 250 U/L (ref 45–117)
ALT SERPL-CCNC: 590 U/L (ref 12–78)
ANION GAP SERPL CALC-SCNC: 12 MMOL/L (ref 5–15)
AST SERPL-CCNC: 584 U/L (ref 15–37)
ATRIAL RATE: 81 BPM
BASOPHILS # BLD: 0 K/UL (ref 0–0.1)
BASOPHILS NFR BLD: 0 % (ref 0–1)
BILIRUB SERPL-MCNC: 1.1 MG/DL (ref 0.2–1)
BUN SERPL-MCNC: 67 MG/DL (ref 6–20)
BUN/CREAT SERPL: 9 (ref 12–20)
CALCIUM SERPL-MCNC: 8.4 MG/DL (ref 8.5–10.1)
CALCULATED P AXIS, ECG09: 72 DEGREES
CALCULATED R AXIS, ECG10: 61 DEGREES
CALCULATED T AXIS, ECG11: 105 DEGREES
CHLORIDE SERPL-SCNC: 96 MMOL/L (ref 97–108)
CO2 SERPL-SCNC: 25 MMOL/L (ref 21–32)
CREAT SERPL-MCNC: 7.29 MG/DL (ref 0.7–1.3)
CRP SERPL-MCNC: 4.68 MG/DL (ref 0–0.6)
D DIMER PPP FEU-MCNC: 8.17 MG/L FEU (ref 0–0.65)
DIAGNOSIS, 93000: NORMAL
DIFFERENTIAL METHOD BLD: ABNORMAL
EOSINOPHIL # BLD: 0 K/UL (ref 0–0.4)
EOSINOPHIL NFR BLD: 0 % (ref 0–7)
ERYTHROCYTE [DISTWIDTH] IN BLOOD BY AUTOMATED COUNT: 20.2 % (ref 11.5–14.5)
GLOBULIN SER CALC-MCNC: 4.8 G/DL (ref 2–4)
GLUCOSE SERPL-MCNC: 130 MG/DL (ref 65–100)
HAV IGM SER QL: NONREACTIVE
HBV CORE IGM SER QL: NONREACTIVE
HBV SURFACE AG SER QL: 0.5 INDEX
HBV SURFACE AG SER QL: NEGATIVE
HCT VFR BLD AUTO: 30 % (ref 36.6–50.3)
HCV AB SERPL QL IA: NONREACTIVE
HGB BLD-MCNC: 9.7 G/DL (ref 12.1–17)
IMM GRANULOCYTES # BLD AUTO: 0.1 K/UL (ref 0–0.04)
IMM GRANULOCYTES NFR BLD AUTO: 1 % (ref 0–0.5)
INR PPP: 3 (ref 0.9–1.1)
LACTATE SERPL-SCNC: 2.2 MMOL/L (ref 0.4–2)
LACTATE SERPL-SCNC: 2.8 MMOL/L (ref 0.4–2)
LYMPHOCYTES # BLD: 0.1 K/UL (ref 0.8–3.5)
LYMPHOCYTES NFR BLD: 1 % (ref 12–49)
MAGNESIUM SERPL-MCNC: 2.8 MG/DL (ref 1.6–2.4)
MCH RBC QN AUTO: 31.6 PG (ref 26–34)
MCHC RBC AUTO-ENTMCNC: 32.3 G/DL (ref 30–36.5)
MCV RBC AUTO: 97.7 FL (ref 80–99)
MONOCYTES # BLD: 0.5 K/UL (ref 0–1)
MONOCYTES NFR BLD: 5 % (ref 5–13)
NEUTS SEG # BLD: 9.6 K/UL (ref 1.8–8)
NEUTS SEG NFR BLD: 93 % (ref 32–75)
NRBC # BLD: 0.9 K/UL (ref 0–0.01)
NRBC BLD-RTO: 8.8 PER 100 WBC
P-R INTERVAL, ECG05: 166 MS
PHOSPHATE SERPL-MCNC: 5.5 MG/DL (ref 2.6–4.7)
PLATELET # BLD AUTO: 88 K/UL (ref 150–400)
PMV BLD AUTO: 11.8 FL (ref 8.9–12.9)
POTASSIUM SERPL-SCNC: 5.3 MMOL/L (ref 3.5–5.1)
PROT SERPL-MCNC: 7.9 G/DL (ref 6.4–8.2)
PROTHROMBIN TIME: 29.8 SEC (ref 9–11.1)
Q-T INTERVAL, ECG07: 468 MS
QRS DURATION, ECG06: 106 MS
QTC CALCULATION (BEZET), ECG08: 543 MS
RBC # BLD AUTO: 3.07 M/UL (ref 4.1–5.7)
RBC MORPH BLD: ABNORMAL
SODIUM SERPL-SCNC: 133 MMOL/L (ref 136–145)
SP1: NORMAL
SP2: NORMAL
SP3: NORMAL
VANCOMYCIN SERPL-MCNC: 19 UG/ML
VENTRICULAR RATE, ECG03: 81 BPM
WBC # BLD AUTO: 10.3 K/UL (ref 4.1–11.1)

## 2021-12-01 PROCEDURE — 86140 C-REACTIVE PROTEIN: CPT

## 2021-12-01 PROCEDURE — 74011250637 HC RX REV CODE- 250/637: Performed by: INTERNAL MEDICINE

## 2021-12-01 PROCEDURE — 85025 COMPLETE CBC W/AUTO DIFF WBC: CPT

## 2021-12-01 PROCEDURE — 84100 ASSAY OF PHOSPHORUS: CPT

## 2021-12-01 PROCEDURE — 80202 ASSAY OF VANCOMYCIN: CPT

## 2021-12-01 PROCEDURE — 97530 THERAPEUTIC ACTIVITIES: CPT

## 2021-12-01 PROCEDURE — 83735 ASSAY OF MAGNESIUM: CPT

## 2021-12-01 PROCEDURE — 97116 GAIT TRAINING THERAPY: CPT

## 2021-12-01 PROCEDURE — 74011250636 HC RX REV CODE- 250/636: Performed by: INTERNAL MEDICINE

## 2021-12-01 PROCEDURE — 80053 COMPREHEN METABOLIC PANEL: CPT

## 2021-12-01 PROCEDURE — 36415 COLL VENOUS BLD VENIPUNCTURE: CPT

## 2021-12-01 PROCEDURE — 97530 THERAPEUTIC ACTIVITIES: CPT | Performed by: OCCUPATIONAL THERAPIST

## 2021-12-01 PROCEDURE — 83605 ASSAY OF LACTIC ACID: CPT

## 2021-12-01 PROCEDURE — 80074 ACUTE HEPATITIS PANEL: CPT

## 2021-12-01 PROCEDURE — 85610 PROTHROMBIN TIME: CPT

## 2021-12-01 PROCEDURE — 65270000029 HC RM PRIVATE

## 2021-12-01 PROCEDURE — 74011000250 HC RX REV CODE- 250: Performed by: INTERNAL MEDICINE

## 2021-12-01 PROCEDURE — 71045 X-RAY EXAM CHEST 1 VIEW: CPT

## 2021-12-01 PROCEDURE — 85379 FIBRIN DEGRADATION QUANT: CPT

## 2021-12-01 PROCEDURE — 97535 SELF CARE MNGMENT TRAINING: CPT | Performed by: OCCUPATIONAL THERAPIST

## 2021-12-01 PROCEDURE — 74011250636 HC RX REV CODE- 250/636: Performed by: NURSE PRACTITIONER

## 2021-12-01 RX ORDER — WARFARIN 1 MG/1
1 TABLET ORAL ONCE
Status: COMPLETED | OUTPATIENT
Start: 2021-12-01 | End: 2021-12-01

## 2021-12-01 RX ORDER — FENTANYL CITRATE 50 UG/ML
25 INJECTION, SOLUTION INTRAMUSCULAR; INTRAVENOUS ONCE
Status: COMPLETED | OUTPATIENT
Start: 2021-12-01 | End: 2021-12-01

## 2021-12-01 RX ORDER — HYDROMORPHONE HYDROCHLORIDE 1 MG/ML
1 INJECTION, SOLUTION INTRAMUSCULAR; INTRAVENOUS; SUBCUTANEOUS
Status: DISCONTINUED | OUTPATIENT
Start: 2021-12-01 | End: 2021-12-02

## 2021-12-01 RX ORDER — METOPROLOL SUCCINATE 25 MG/1
12.5 TABLET, EXTENDED RELEASE ORAL DAILY
Status: DISCONTINUED | OUTPATIENT
Start: 2021-12-01 | End: 2021-12-07

## 2021-12-01 RX ADMIN — GUAIFENESIN 600 MG: 600 TABLET, EXTENDED RELEASE ORAL at 20:41

## 2021-12-01 RX ADMIN — Medication 10 ML: at 17:59

## 2021-12-01 RX ADMIN — Medication 1 CAPSULE: at 09:19

## 2021-12-01 RX ADMIN — HYDROMORPHONE HYDROCHLORIDE 1 MG: 1 INJECTION, SOLUTION INTRAMUSCULAR; INTRAVENOUS; SUBCUTANEOUS at 22:39

## 2021-12-01 RX ADMIN — CALCIUM ACETATE 1334 MG: 667 CAPSULE ORAL at 09:19

## 2021-12-01 RX ADMIN — EPOETIN ALFA-EPBX 12000 UNITS: 10000 INJECTION, SOLUTION INTRAVENOUS; SUBCUTANEOUS at 22:00

## 2021-12-01 RX ADMIN — GUAIFENESIN 600 MG: 600 TABLET, EXTENDED RELEASE ORAL at 09:19

## 2021-12-01 RX ADMIN — CALCIUM CARBONATE (ANTACID) CHEW TAB 500 MG 200 MG: 500 CHEW TAB at 17:58

## 2021-12-01 RX ADMIN — CALCIUM ACETATE 1334 MG: 667 CAPSULE ORAL at 12:50

## 2021-12-01 RX ADMIN — DEXAMETHASONE 6 MG: 4 TABLET ORAL at 09:20

## 2021-12-01 RX ADMIN — CALCITRIOL CAPSULES 0.25 MCG 0.5 MCG: 0.25 CAPSULE ORAL at 09:20

## 2021-12-01 RX ADMIN — OXYCODONE 5 MG: 5 TABLET ORAL at 09:19

## 2021-12-01 RX ADMIN — CALCIUM ACETATE 1334 MG: 667 CAPSULE ORAL at 17:58

## 2021-12-01 RX ADMIN — Medication 10 ML: at 05:43

## 2021-12-01 RX ADMIN — OXYCODONE HYDROCHLORIDE AND ACETAMINOPHEN 500 MG: 500 TABLET ORAL at 09:20

## 2021-12-01 RX ADMIN — FENTANYL CITRATE 25 MCG: 50 INJECTION INTRAMUSCULAR; INTRAVENOUS at 02:10

## 2021-12-01 RX ADMIN — CALCIUM CARBONATE (ANTACID) CHEW TAB 500 MG 200 MG: 500 CHEW TAB at 09:19

## 2021-12-01 RX ADMIN — ASPIRIN 81 MG CHEWABLE TABLET 81 MG: 81 TABLET CHEWABLE at 09:20

## 2021-12-01 RX ADMIN — OXYCODONE 5 MG: 5 TABLET ORAL at 20:41

## 2021-12-01 RX ADMIN — NITROGLYCERIN 0.4 MG: 0.4 TABLET, ORALLY DISINTEGRATING SUBLINGUAL at 12:25

## 2021-12-01 RX ADMIN — WARFARIN SODIUM 1 MG: 1 TABLET ORAL at 17:58

## 2021-12-01 RX ADMIN — Medication 10 ML: at 22:03

## 2021-12-01 RX ADMIN — FENTANYL CITRATE 25 MCG: 0.05 INJECTION, SOLUTION INTRAMUSCULAR; INTRAVENOUS at 13:56

## 2021-12-01 RX ADMIN — PANTOPRAZOLE SODIUM 40 MG: 40 TABLET, DELAYED RELEASE ORAL at 09:20

## 2021-12-01 RX ADMIN — FAMOTIDINE 20 MG: 20 TABLET, FILM COATED ORAL at 09:20

## 2021-12-01 RX ADMIN — METOPROLOL SUCCINATE 12.5 MG: 25 TABLET, EXTENDED RELEASE ORAL at 09:19

## 2021-12-01 RX ADMIN — HYDROMORPHONE HYDROCHLORIDE 1 MG: 1 INJECTION, SOLUTION INTRAMUSCULAR; INTRAVENOUS; SUBCUTANEOUS at 18:24

## 2021-12-01 RX ADMIN — CALCIUM CARBONATE (ANTACID) CHEW TAB 500 MG 200 MG: 500 CHEW TAB at 12:50

## 2021-12-01 RX ADMIN — ZINC SULFATE 220 MG (50 MG) CAPSULE 1 CAPSULE: CAPSULE at 09:20

## 2021-12-01 RX ADMIN — LIDOCAINE HYDROCHLORIDE 40 ML: 20 SOLUTION ORAL; TOPICAL at 10:08

## 2021-12-01 NOTE — PROGRESS NOTES
Problem: Mobility Impaired (Adult and Pediatric)  Goal: *Acute Goals and Plan of Care (Insert Text)  Description: FUNCTIONAL STATUS PRIOR TO ADMISSION: Patient was independent and active without use of DME.    HOME SUPPORT PRIOR TO ADMISSION: The patient lived with sister. Physical Therapy Goals  Initiated 11/30/2021  1. Patient will move from supine to sit and sit to supine  in bed with independence within 7 day(s). 2.  Patient will transfer from bed to chair and chair to bed with modified independence using the least restrictive device within 7 day(s). 3.  Patient will perform sit to stand with modified independence within 7 day(s). 4.  Patient will ambulate with modified independence for 100 feet with the least restrictive device within 7 day(s). Outcome: Progressing Towards Goal    PHYSICAL THERAPY TREATMENT  Patient: Edu Carbajal (9 y.o. male)  Date: 12/1/2021  Diagnosis: Acute COVID-19 [U07.1]   <principal problem not specified>       Precautions: Contact, Fall, Skin (COVID)  Chart, physical therapy assessment, plan of care and goals were reviewed. ASSESSMENT  Patient continues with skilled PT services and is progressing towards goals. Pt was received in supine and cleared by nursing to mobilize. He was able to come to the edge of the bed without assistance. Stood and ambulated to the bathroom without AD, unsteady balance and noted loss of balance. Worked on standing tolerance with OT. Ambulated back to the chair and unsafe decent to the chair. Walked around the room without AD, still unsteady. Provided walker and continued to have trunk instability with gait. He was left sitting up in the chair. Vitals stable during session. Limited support at home. Current Level of Function Impacting Discharge (mobility/balance): CGA    Other factors to consider for discharge:          PLAN :  Patient continues to benefit from skilled intervention to address the above impairments.   Continue treatment per established plan of care. to address goals. Recommendation for discharge: (in order for the patient to meet his/her long term goals)  Rehab    This discharge recommendation:  Has been made in collaboration with the attending provider and/or case management    IF patient discharges home will need the following DME: rolling walker       SUBJECTIVE:   Patient stated my sister works during the day.     OBJECTIVE DATA SUMMARY:   Critical Behavior:  Neurologic State: (P) Alert  Orientation Level: (P) Oriented X4  Cognition: (P) Appropriate decision making, Appropriate for age attention/concentration, Appropriate safety awareness  Safety/Judgement: Decreased awareness of need for safety  Functional Mobility Training:  Bed Mobility:  Rolling: Supervision  Supine to Sit: Supervision     Scooting: Supervision        Transfers:  Sit to Stand: Contact guard assistance  Stand to Sit: Contact guard assistance                             Balance:  Sitting: Intact  Standing: Impaired  Standing - Static: Good; Constant support  Standing - Dynamic : Fair; Constant support  Ambulation/Gait Training:  Distance (ft): 60 Feet (ft)  Assistive Device: Gait belt; Walker, rolling  Ambulation - Level of Assistance: Contact guard assistance        Gait Abnormalities: Decreased step clearance; Path deviations        Base of Support: Narrowed     Speed/Bernarda: Pace decreased (<100 feet/min); Shuffled  Step Length: Left shortened; Right shortened         Activity Tolerance:   Fair    After treatment patient left in no apparent distress:   Sitting in chair and Call bell within reach    COMMUNICATION/COLLABORATION:   The patients plan of care was discussed with: Occupational therapist and Registered nurse.      Jason Davis, PT, DPT   Time Calculation: 23 mins

## 2021-12-01 NOTE — PROGRESS NOTES
Pharmacist Daily Dosing of Warfarin    Indication & Goal INR: DVT, INR Goal 2-3    PTA Warfarin Dose: 2.5 mg daily    Notable concurrent conditions and medications: Aspirin    Labs:  Recent Labs     12/01/21  0440 11/30/21  0031 11/30/21  0031 11/29/21  0307 11/29/21  0307   INR 3.0*  --  2.3*  --  2.3*   HGB 9.7*   < > 8.8*   < > 8.6*   PLT 88*  --  84*  --  100*   TBILI 1.1*  --  1.0  --  1.8*   ALB 3.1*   < > 2.9*   < > 3.1*    < > = values in this interval not displayed. Impression/Plan:   Give warfarin 1 mg PO x1 tonight  Daily INR has been ordered  CBC w/o differential every other day has been ordered     Pharmacy will follow daily and adjust the dose as appropriate.     Thank you,  Bandar Vargas, PHARMD
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
## 2021-12-01 NOTE — PROGRESS NOTES
Problem: Self Care Deficits Care Plan (Adult)  Goal: *Acute Goals and Plan of Care (Insert Text)  Description:   FUNCTIONAL STATUS PRIOR TO ADMISSION: Patient was independent and active without use of DME. Pt reports doing his own home HD.    HOME SUPPORT: The patient lived with sister but did not require assist.    Occupational Therapy Goals  Initiated 11/28/2021  1. Patient will perform grooming standing sinkside with CGA/minimal assist within 7 day(s). 2.  Patient will perform bathing in supported sitting with supervision/set-up within 7 day(s). 3.  Patient will perform lower body dressing with minimal assistance/contact guard assist within 7 day(s). 4.  Patient will perform toilet transfers with minimal assistance/contact guard assist within 7 day(s). 5.  Patient will perform all aspects of toileting with supervision/set-up within 7 day(s). 6.  Patient will participate in upper extremity therapeutic exercise/activities with supervision/set-up for 15 minutes within 7 day(s). 7.  Patient will utilize energy conservation techniques during functional activities with verbal cues within 7 day(s). Outcome: Progressing Towards Goal    OCCUPATIONAL THERAPY TREATMENT  Patient: Tomi Whiteside (46 y.o. male)  Date: 12/1/2021  Diagnosis: Acute COVID-19 [U07.1]   <principal problem not specified>       Precautions: Contact, Fall, Skin (COVID)  Chart, occupational therapy assessment, plan of care, and goals were reviewed. ASSESSMENT  Patient was much more motivated today and demonstrating some improvement in his activity tolerance, standing balance and general strength. Overall he was supervision for bed mobility, he was CGA to min A for transfers and ambulation, and he was supervision/setup to min A for functional mobility. He was unsteady during standing ADLs and functional mobility, but he only experienced one questionable LOB when standing and turning while in the bathroom.  He was able to self recover his LOB. His tolerance for activity was fair and his VS were stable t/o this session on RA. At this time he continues to benefit from acute OT and he will need Rehab after discharge. PLAN :  Patient continues to benefit from skilled intervention to address the above impairments. Continue treatment per established plan of care. to address goals. Recommendation for discharge: (in order for the patient to meet his/her long term goals)  At present he would need Rehab at discharge      Equipment recommendations for successful discharge (if) home: Rolling walker and shower seat. OBJECTIVE DATA SUMMARY:   Cognitive/Behavioral Status:  Neurologic State: Alert  Orientation Level: Oriented X4  Cognition: Appropriate for age attention/concentration; Follows commands        Safety/Judgement: Decreased awareness of need for safety; Insight into deficits    Functional Mobility and Transfers for ADLs:  Bed Mobility:  Rolling: Supervision  Supine to Sit: Supervision  Scooting: Supervision    Transfers:  Sit to Stand: Contact guard assistance  Functional Transfers  Bathroom Mobility: Minimum assistance (ambulating via HHA, CGA with use of RW)       Balance:  Sitting: Intact  Standing: Impaired  Standing - Static: Good; Constant support  Standing - Dynamic : Fair; Constant support    ADL Intervention:    Grooming  Position Performed: Standing  Washing Hands: CGA    Upper Body Dressing Assistance  Shirt simulation with hospital gown: Supervision; Set-up (seated EOB)    Lower Body Dressing Assistance  Socks: Supervision; Set-up  Leg Crossed Method Used: Yes  Position Performed: Seated edge of bed; Bending forward method    Toileting  Toileting Assistance: Contact guard assistance; Minimum assistance  Bowel Hygiene: Contact guard assistance  Clothing Management: Minimum assistance  Cues: Verbal cues provided    Cognitive Retraining  Safety/Judgement: Decreased awareness of need for safety;  Insight into deficits        Activity Tolerance:   Fair  Please refer to the flowsheet for vital signs taken during this treatment.     After treatment patient left in no apparent distress:   Sitting in chair and Call bell within reach    COMMUNICATION/COLLABORATION:   The patients plan of care was discussed with: Physical Therapist, Registered Nurse, and     Bryan Salazar OTR/L  Time Calculation: 23 mins

## 2021-12-01 NOTE — PROGRESS NOTES
Nephrology Progress Note  Blaise Leblanc     [email or web address]  Phone - [phone]   Patient: Adolph Quevedo    YOB: 1975        Date- 12/1/2021   Admit Date: 11/27/2021  CC: Follow up for  ESRD          IMPRESSION & PLAN:   · ESRD- home HD 5 days per week- Follows up with Dr Monet Lanza at [facility] ( now [facility] HD for IV antibiotics.)  · Covid 19 infection  · hyperkalemia  · Left arm swelling- s/p angioplasty of left subclavian vein  · Gram positive sepsis from dental abcess  · s/p lead extraction at [facility]  · Anemia of ckd  · Hx of renal tx in past times 2.  · Hypertension  · CAD  · Failed RTX times 2         H/o DVT, s/p ivc filter/ h/o HIT      PLAN-   Plan for hemodialysis today   k should improve with dialysis today   Continue phoslo   EPO for anemia     Subjective: Interval History:   Seen and examined today in room  Complains of abdominal/chest discomfort. No sob  No fever      11/28   Patient was seen and examined. Well-known to me from his previous admissions. He was recently here for gram-positive sepsis from dental abscesses. He is at home evaluation of Dr. Monet Lanza at [facility]. Because of the requirement for needing IV antibiotics he was changed to in centre dialysis at [facility] with Dr. Monet Lanza. He presented again with: The fever and chills. Initial evaluation in the ER showed positive COVID-19 test.  Patient admitted for further evaluation. Renal consult was requested for management of hemodialysis.     Objective:   Vitals:    12/01/21 0501 12/01/21 0817 12/01/21 0919 12/01/21 1031   BP:  110/77 114/78 124/85   Pulse:  76 76 77   Resp:  18  18   Temp:  97.6 °F (36.4 °C)  97.6 °F (36.4 °C)   TempSrc:       SpO2:  100%  100%   Weight: 63.9 kg (140 lb 12.8 oz)      Height:          11/30 0701 - 12/01 0700  In: 920 [P.O.:920]  Out: -   Last 3 Recorded Weights in this Encounter    11/28/21 1237 11/30/21 0354 12/01/21 0501   Weight: 64.6 kg (142 lb 6.7 oz) 64.6 kg (142 lb 6.4 oz) 63.9 kg (140 lb 12.8 oz)      Physical exam:   GEN: NAD  NECK- Supple  RESP: no distress  NEURO:non focal      Chart reviewed. Pertinent Notes reviewed. Data Review :  Recent Labs     12/01/21 0440 11/30/21 0031 11/29/21  0307   * 135* 132*   K 5.3* 4.9 6.0*   CL 96* 98 94*   CO2 25 23 16*   BUN 67* 46* 61*   CREA 7.29* 5.71* 8.39*   * 160* 89   CA 8.4* 8.0* 8.1*   MG 2.8* 2.4 2.6*   PHOS 5.5* 5.8* 8.0*     Recent Labs     12/01/21 0440 11/30/21 0031 11/29/21  0307   WBC 10.3 12.3* 10.9   HGB 9.7* 8.8* 8.6*   HCT 30.0* 27.2* 27.8*   PLT 88* 84* 100*     No results for input(s): FE, TIBC, PSAT, FERR in the last 72 hours.    Medication list  reviewed  Current Facility-Administered Medications   Medication Dose Route Frequency    metoprolol succinate (TOPROL-XL) XL tablet 12.5 mg  12.5 mg Oral DAILY    warfarin (COUMADIN) tablet 1 mg  1 mg Oral ONCE    calcium carbonate (TUMS) chewable tablet 200 mg [elemental]  200 mg Oral TID WITH MEALS    aluminum-magnesium hydroxide (MAALOX) oral suspension 15 mL  15 mL Oral QID PRN    famotidine (PEPCID) tablet 20 mg  20 mg Oral DAILY    nitroglycerin (NITROSTAT) tablet 0.4 mg  0.4 mg SubLINGual PRN    sodium chloride (NS) flush 5-10 mL  5-10 mL IntraVENous PRN    acetaminophen (TYLENOL) tablet 650 mg  650 mg Oral Q6H PRN    L.acidophilus-paracasei-S.thermophil-bifidobacter (RISAQUAD) 8 billion cell capsule  1 Capsule Oral DAILY    aspirin chewable tablet 81 mg  81 mg Oral DAILY    calcitRIOL (ROCALTROL) capsule 0.5 mcg  0.5 mcg Oral DAILY    calcium acetate(phosphat bind) (PHOSLO) capsule 1,334 mg  2 Capsule Oral TID WITH MEALS    pantoprazole (PROTONIX) tablet 40 mg  40 mg Oral ACB    sodium chloride (NS) flush 5-40 mL  5-40 mL IntraVENous Q8H    sodium chloride (NS) flush 5-40 mL  5-40 mL IntraVENous PRN    polyethylene glycol (MIRALAX) packet 17 g  17 g Oral DAILY PRN    ondansetron (ZOFRAN ODT) tablet 4 mg  4 mg Oral Q8H PRN    Or    ondansetron (ZOFRAN) injection 4 mg  4 mg IntraVENous Q6H PRN    oxyCODONE IR (ROXICODONE) tablet 5 mg  5 mg Oral Q4H PRN    ascorbic acid (vitamin C) (VITAMIN C) tablet 500 mg  500 mg Oral DAILY    zinc sulfate (ZINCATE) 50 mg zinc (220 mg) capsule 1 Capsule  1 Capsule Oral DAILY    guaiFENesin ER (MUCINEX) tablet 600 mg  600 mg Oral Q12H    albuterol (PROVENTIL HFA, VENTOLIN HFA, PROAIR HFA) inhaler 2 Puff  2 Puff Inhalation Q6H PRN    dexAMETHasone (DECADRON) tablet 6 mg  6 mg Oral DAILY    Warfarin - Pharmacy to Dose   Other Rx Dosing/Monitoring    Vancomycin - Pharmacy to dose   Other Rx Dosing/Monitoring          José Mt, [address] [facility]  [facility] / [facility] [address]  [place], [address]  Phone - [phone]               Fax - [phone]

## 2021-12-01 NOTE — PROGRESS NOTES
Progress Note      12/1/2021 6:02 AM  NAME: Hosea Pinedo   MRN:  [id number]   Admit Diagnosis: Acute COVID-19 [U07.1]     Problem List:     Problem list:   - COVID PNA   - AWMI with PCI of LAD in 2008  - ICM EF 25%, EVER 3/2021 no signs of endocarditis, TTE 11/2021 EF 35%, sev pHTN  - Hx of ICD in 2009, subsequent lead fracture and device was turned off, s/p [employer] Sci Sub Q ICD, in 2021 Intravenous ICD was extracted due to recurrent bacteremia, EVER with no overt infection  - Hx of IVC filter due to DVT  - ESRD on HD, failed renal transplant x 2, home HD Dr. Wisdom Purchase  - Anemia  - Thrombocytopnia, hx of HIT  - Recurrent bacteremia, follows with ID  - Hx of non-compliance. - Abdominal pain              Assessment/Plan:     COVID PNA  Increased LFT  Atypical chest pain, no ST changes, down trending equivicol HS trop, manage medically    - Cont warfarin  - Cont ASA  - resume metoprolol succ 12.5mg  - Holding atorvastatin until LFT improve            [x]       High complexity decision making was performed in this patient at high risk for decompensation with multiple organ involvement. We discussed the expected course, resolution and complications of the diagnoses in detail. Medication risk, benefits, costs, interactions, and alternatives were discussed as indicated. I advised him to contact the office if his condition worsens, changes or fails to improve as anticipated. Patient expressed understanding with the diagnoses  and plan. Subjective:     Hosea Pinedo still with some chest, abdominal discomfort, but better  Discussed with RN events overnight.      Review of Systems:    Symptom Y/N Comments  Symptom Y/N Comments   Fever/Chills N   Chest Pain     Poor Appetite N   Edema N    Cough N   Abdominal Pain N    Sputum N   Joint Pain N    SOB/ZAMUDIO Y   Pruritis/Rash N    Nausea/vomit N   Tolerating PT/OT     Diarrhea N   Tolerating Diet Y    Constipation N   Other       Could NOT obtain due to:      Objective: Physical Exam:    Last 24hrs VS reviewed since prior progress note. Most recent are:    Visit Vitals  /83 (BP 1 Location: Right upper arm, BP Patient Position: At rest)   Pulse 92   Temp 97.6 °F (36.4 °C)   Resp 18   Ht 5' 7\" (1.702 m)   Wt 63.9 kg (140 lb 12.8 oz)   SpO2 100%   BMI 22.05 kg/m²       Intake/Output Summary (Last 24 hours) at 12/1/2021 0602  Last data filed at 12/1/2021 0323  Gross per 24 hour   Intake 920 ml   Output    Net 920 ml        General Appearance: Well developed, chronically ill, alert & oriented x 3,    no acute distress. Ears/Nose/Mouth/Throat: Hearing grossly normal.  Neck: Supple. Chest: Lungs ronchi to auscultation bilaterally. Cardiovascular: Regular rate and rhythm, S1S2 normal, no murmur. Abdomen: Soft, non-tender, bowel sounds are active. Extremities: No edema bilaterally. Skin: Warm and dry. PMH/ reviewed - no change compared to H&P    Data Review    Telemetry: normal sinus rhythm     Lab Data Personally Reviewed:    Recent Labs     12/01/21 0440 11/30/21 0031   WBC 10.3 12.3*   HGB 9.7* 8.8*   HCT 30.0* 27.2*   PLT 88* 84*     Recent Labs     12/01/21 0440 11/30/21 0031 11/29/21  0307   INR 3.0* 2.3* 2.3*   PTP 29.8* 22.8* 22.9*      Recent Labs     12/01/21 0440 11/30/21 0031 11/29/21  0307   * 135* 132*   K 5.3* 4.9 6.0*   CL 96* 98 94*   CO2 25 23 16*   BUN 67* 46* 61*   CREA 7.29* 5.71* 8.39*   * 160* 89   CA 8.4* 8.0* 8.1*   MG 2.8* 2.4 2.6*     No results for input(s): CPK, CKNDX, TROIQ in the last 72 hours.     No lab exists for component: CPKMB  Lab Results   Component Value Date/Time    Cholesterol, total 139 05/22/2012 05:00 PM    HDL Cholesterol 32 05/22/2012 05:00 PM    LDL, calculated 75.8 05/22/2012 05:00 PM    Triglyceride 156 (H) 05/22/2012 05:00 PM    CHOL/HDL Ratio 4.3 05/22/2012 05:00 PM       Recent Labs     12/01/21  0440 11/30/21  0031 11/29/21  0307   * 233* 227*   TP 7.9 7.5 8.0   ALB 3.1* 2.9* 3.1*   GLOB 4. 8* 4.6* 4.9*     No results for input(s): PH, PCO2, PO2 in the last 72 hours.     Medications Personally Reviewed:    Current Facility-Administered Medications   Medication Dose Route Frequency    calcium carbonate (TUMS) chewable tablet 200 mg [elemental]  200 mg Oral TID WITH MEALS    aluminum-magnesium hydroxide (MAALOX) oral suspension 15 mL  15 mL Oral QID PRN    famotidine (PEPCID) tablet 20 mg  20 mg Oral DAILY    nitroglycerin (NITROSTAT) tablet 0.4 mg  0.4 mg SubLINGual PRN    sodium chloride (NS) flush 5-10 mL  5-10 mL IntraVENous PRN    acetaminophen (TYLENOL) tablet 650 mg  650 mg Oral Q6H PRN    L.acidophilus-paracasei-S.thermophil-bifidobacter (RISAQUAD) 8 billion cell capsule  1 Capsule Oral DAILY    aspirin chewable tablet 81 mg  81 mg Oral DAILY    calcitRIOL (ROCALTROL) capsule 0.5 mcg  0.5 mcg Oral DAILY    calcium acetate(phosphat bind) (PHOSLO) capsule 1,334 mg  2 Capsule Oral TID WITH MEALS    pantoprazole (PROTONIX) tablet 40 mg  40 mg Oral ACB    sodium chloride (NS) flush 5-40 mL  5-40 mL IntraVENous Q8H    sodium chloride (NS) flush 5-40 mL  5-40 mL IntraVENous PRN    polyethylene glycol (MIRALAX) packet 17 g  17 g Oral DAILY PRN    ondansetron (ZOFRAN ODT) tablet 4 mg  4 mg Oral Q8H PRN    Or    ondansetron (ZOFRAN) injection 4 mg  4 mg IntraVENous Q6H PRN    oxyCODONE IR (ROXICODONE) tablet 5 mg  5 mg Oral Q4H PRN    ascorbic acid (vitamin C) (VITAMIN C) tablet 500 mg  500 mg Oral DAILY    zinc sulfate (ZINCATE) 50 mg zinc (220 mg) capsule 1 Capsule  1 Capsule Oral DAILY    guaiFENesin ER (MUCINEX) tablet 600 mg  600 mg Oral Q12H    albuterol (PROVENTIL HFA, VENTOLIN HFA, PROAIR HFA) inhaler 2 Puff  2 Puff Inhalation Q6H PRN    dexAMETHasone (DECADRON) tablet 6 mg  6 mg Oral DAILY    Warfarin - Pharmacy to Dose   Other Rx Dosing/Monitoring    Vancomycin - Pharmacy to dose   Other Rx Dosing/Monitoring         Marcelino Browne MD

## 2021-12-01 NOTE — PROGRESS NOTES
Name: Isabelle Hernandez: [address]   : 1975 Admit Date: 2021   Phone: [phone]  Room: 2223/01   PCP: Shereen Mohs, MD  MRN: [id number]   Date: 2021  Code: Full Code          IMPRESSION  · Acute Respiratory Failure with Hypoxia- now on room air  · Covid-19; CXR now with interstitial edema , CRP still 4-5 despite steroids  · Unvaccinated for COVID  · Recurrent chest pain- unlikely PE, therapeutic Warfarin; given bad GERD, steroids would be concerned about developing erosive gastritis or esophagitis  · ESRD on HD  · CM s/p ICD placement  · Severe Pulmonary HTN   · CHF LVEF 35-40%  · Left arm swelling- s/p angioplasty of left subclavian vein  · Gram positive sepsis from dental abcess  · s/p lead extraction at [facility]  · Anemia of ckd  · Hx of renal tx in past times 2.  · Hypertension  · CAD  · Failed RTX times 2  · H/o DVT, s/p ivc filter/ h/o HIT   · Contrast allergy in history- no details     PLAN  · STAT CXR done; personally reviewed  · discussed with nursing  · Continue close observation  · Prn Supplemental oxygen to keep sats >90%  · Continue Dexamethasone; low threshold to increase if he worsens  · Start Baricitinib if oxygen requirements increase  · Treat GERD  · On Cefepime  · Renal following  · Coumadin dosed per pharmacy  · Trend inflammatory markers  · Encourage IS use, proning  · At high risk for decompensation given his comorbidities   · COVID vaccine counseling done      21: On Decadron. Chest and abdominal pain all night. Notes and data reviewed. No nausea. Denies cough congestion. Now on room air. Denies shortness of breath at rest.  Need to monitor him closely. Course is unpredictable. CRP same but CXR now with mild interstitial edema vs early COVID infiltrates? If he starts needing O2 and CRP higher, will add Baricitinib      21: Lots of heartburn this AM. On Decadron. NOtes and data reviewed. No nausea. Denies cough congestion.   Now on room air. Denies shortness of breath at rest.  Need to monitor him closely. Course is unpredictable. If CRP normal in AM, would allow us to be more comfortable if discharged on steroids. If CRP higher, will add Baricitinib     11/29/21: Events of weekend, Notes and data reviewed. Patient now starting hemodialysis. Has back and abdominal discomfort. No nausea. Denies cough congestion. Now on room air. Denies shortness of breath at rest.  Explained to the patient the potential of him getting much worse. Need to monitor him closely. Course is unpredictable. Currently on some treatment. Not a candidate for Remdesivir. Chart and notes reviewed. Data reviewed. I review the patient's current medications in the medical record at each encounter.  I have evaluated and examined the patient. 8:40 AM       History was obtained from patient. I was asked by Bhaskar Edwards MD to see Breanna Silvestre in consultation for a chief complaint of Covid-19 infection. History of Present Illness:   if a 43 yo gentleman with a PMH ESRD on HD MWF, hypertension, ischemic cardiomyopathy s/pp ICD placement, and GERD that presented to the ED on 11/27 with worsening fatigue and SOB. Rapid covid test positive. He is unvaccinated against Covid-19. He is currently on RA on my exam today with sats at 96%. Feels a little better. Images:      CXR 11/27: no acute process    Abdominal CT 11/27:  Relatively small right pleural effusion and minimal left pleural effusion, increased since 11/5/2021. [place] No acute findings in the abdomen or pelvis. .  Renal atrophy and calcified transplant kidneys. Hepatomegaly. No focal lesion. Small amount of ascites. Anasarca. D-dimer 3.15  Lactic acid 8.5  Trop 519  Ferritin 9604  Procalcitonin 26.76  CRP 9.91  Blood cultures negative x 18 hours  ECHO 11/7/21: EF 35-40%, mildly dilated LA, mildly dilated RV, mildly reduced systolic function, trave MVR, moderate TVR.  Severe pulmonary hypertension RVSP 85mmHg,     Past Medical History:   Diagnosis Date    Abdominal hematoma     AICD (automatic cardioverter/defibrillator) present     CAD (coronary artery disease)     anterior MI s/p 2 stents  2007    Chronic abdominal pain     Chronic kidney disease     ESRD; Dialysis dependent.  Home [facility] does labs- [facility] tpke    DVT (deep venous thrombosis) ([address] 2001    DVT of popliteal vein ([address] 11/2011    not anticoagulated due to bleeding, IVC filter    Endocarditis     Gallstone pancreatitis     Gastrointestinal disorder     acid reflux    Gastrointestinal disorder     peptic ulcer    Hemodialysis patient ([address]     High cholesterol     Hypertension     Kidney transplant     b/l kidneys 1995, 2001    Long term current use of anticoagulant therapy     Nephrotic syndrome     Other ill-defined conditions(799.89)     kidny transplant x2,  on dialysis    Other ill-defined conditions(799.89)     home dialysis    Other ill-defined conditions(799.89)     hx recurrent left leg DVT    Peritonitis ([address]     Seizures ([address] 2015    most recent- only had three in lifetime    Small bowel obstruction (HCC)     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    [facility])        Past Surgical History:   Procedure Laterality Date    HX APPENDECTOMY      HX CHOLECYSTECTOMY      HX OTHER SURGICAL  11/2011    exploratory laparotomy    HX OTHER SURGICAL      fem-pop    HX OTHER SURGICAL  02/2013    right upper extremity fistula    HX PACEMAKER Left 6/2009    AICD-st latonya-not connected    HX PACEMAKER Left     AICD-left side-BS-working    HX SMALL BOWEL RESECTION      HX TRANSPLANT  2001     Kidney    HX TRANSPLANT  1992    kidney    HX VASCULAR ACCESS Right     femoral access for HD- does 5 day dialysis @ home    IR REMOVE TUNL CVAD W/O PORT / PUMP  10/29/2020    IR REPLACE CVC TUNNELED W/O PORT  9/10/2020    AL CARDIAC SURG PROCEDURE UNLIST  2007    2 stents    AL EDG US EXAM SURGICAL ALTER STOM DUODENUM/JEJUNUM  7/15/2011         [place] ESOPHAGOGASTRODUODENOSCOPY TRANSORAL DIAGNOSTIC  5/24/2012         VASCULAR SURGERY PROCEDURE UNLIST  11/14/2017    Insertion AV graft right upper arm (bovine); ligation of AV fistula right arm       Family History   Problem Relation Age of Onset    COPD Mother     Lung Disease Mother     Cancer Father         stomach    Cancer Maternal Grandmother        Social History     Tobacco Use    Smoking status: Never Smoker    Smokeless tobacco: Never Used   Substance Use Topics    Alcohol use: No       Allergies   Allergen Reactions    Shellfish Containing Products Swelling     Break out in rash, trouble breathing    Contrast Agent [Iodine] Shortness of Breath    Heparin Analogues Other (comments)     Positive history of HIT       Current Facility-Administered Medications   Medication Dose Route Frequency    metoprolol succinate (TOPROL-XL) XL tablet 12.5 mg  12.5 mg Oral DAILY    warfarin (COUMADIN) tablet 1 mg  1 mg Oral ONCE    epoetin emilie-epbx (RETACRIT) 12,000 Units combo injection  12,000 Units SubCUTAneous Q MON, WED & FRI    calcium carbonate (TUMS) chewable tablet 200 mg [elemental]  200 mg Oral TID WITH MEALS    aluminum-magnesium hydroxide (MAALOX) oral suspension 15 mL  15 mL Oral QID PRN    famotidine (PEPCID) tablet 20 mg  20 mg Oral DAILY    nitroglycerin (NITROSTAT) tablet 0.4 mg  0.4 mg SubLINGual PRN    sodium chloride (NS) flush 5-10 mL  5-10 mL IntraVENous PRN    acetaminophen (TYLENOL) tablet 650 mg  650 mg Oral Q6H PRN    L.acidophilus-paracasei-S.thermophil-bifidobacter (RISAQUAD) 8 billion cell capsule  1 Capsule Oral DAILY    aspirin chewable tablet 81 mg  81 mg Oral DAILY    calcitRIOL (ROCALTROL) capsule 0.5 mcg  0.5 mcg Oral DAILY    calcium acetate(phosphat bind) (PHOSLO) capsule 1,334 mg  2 Capsule Oral TID WITH MEALS    pantoprazole (PROTONIX) tablet 40 mg  40 mg Oral ACB    sodium chloride (NS) flush 5-40 mL  5-40 mL IntraVENous Q8H    sodium chloride (NS) flush 5-40 mL  5-40 mL IntraVENous PRN    polyethylene glycol (MIRALAX) packet 17 g  17 g Oral DAILY PRN    ondansetron (ZOFRAN ODT) tablet 4 mg  4 mg Oral Q8H PRN    Or    ondansetron (ZOFRAN) injection 4 mg  4 mg IntraVENous Q6H PRN    oxyCODONE IR (ROXICODONE) tablet 5 mg  5 mg Oral Q4H PRN    ascorbic acid (vitamin C) (VITAMIN C) tablet 500 mg  500 mg Oral DAILY    zinc sulfate (ZINCATE) 50 mg zinc (220 mg) capsule 1 Capsule  1 Capsule Oral DAILY    guaiFENesin ER (MUCINEX) tablet 600 mg  600 mg Oral Q12H    albuterol (PROVENTIL HFA, VENTOLIN HFA, PROAIR HFA) inhaler 2 Puff  2 Puff Inhalation Q6H PRN    dexAMETHasone (DECADRON) tablet 6 mg  6 mg Oral DAILY    Warfarin - Pharmacy to Dose   Other Rx Dosing/Monitoring    Vancomycin - Pharmacy to dose   Other Rx Dosing/Monitoring         REVIEW OF SYSTEMS   Negative except as stated in the HPI. Physical Exam:   Visit Vitals  /78   Pulse 78   Temp 97.6 °F (36.4 °C)   Resp 18   Ht 5' 7\" (1.702 m)   Wt 63.9 kg (140 lb 12.8 oz)   SpO2 100%   BMI 22.05 kg/m²       General:  Alert, cooperative, no distress, appears stated age. Head:  Normocephalic, without obvious abnormality, atraumatic. Eyes:  Conjunctivae/corneas clear. Nose: Nares normal. Septum midline. Mucosa normal.    Throat: Lips, mucosa, and tongue normal. Teeth and gums normal.   Neck: Supple, symmetrical, trachea midline. Lungs:   Clear to auscultation bilaterally. Chest wall:  No tenderness or deformity. Heart:  Regular rate and rhythm, S1, S2 normal, no murmur, click, rub or gallop. Abdomen:   Soft, non-tender. Bowel sounds normal. No masses,  No organomegaly. Extremities: Extremities normal, atraumatic, no cyanosis or edema. Pulses: 2+ and symmetric all extremities.    Skin: Skin color, texture, turgor normal. No rashes or lesions   Lymph nodes: Cervical, supraclavicular, and axillary nodes normal. Neurologic: Grossly nonfocal       Lab Results   Component Value Date/Time    Sodium 133 (L) 12/01/2021 04:40 AM    Potassium 5.3 (H) 12/01/2021 04:40 AM    Chloride 96 (L) 12/01/2021 04:40 AM    CO2 25 12/01/2021 04:40 AM    BUN 67 (H) 12/01/2021 04:40 AM    Creatinine 7.29 (H) 12/01/2021 04:40 AM    Glucose 130 (H) 12/01/2021 04:40 AM    Calcium 8.4 (L) 12/01/2021 04:40 AM    Magnesium 2.8 (H) 12/01/2021 04:40 AM    Phosphorus 5.5 (H) 12/01/2021 04:40 AM    Lactic acid 2.8 (HH) 12/01/2021 11:27 AM       Lab Results   Component Value Date/Time    WBC 10.3 12/01/2021 04:40 AM    HGB 9.7 (L) 12/01/2021 04:40 AM    PLATELET 88 (L) 03/39/4527 04:40 AM    MCV 97.7 12/01/2021 04:40 AM       Lab Results   Component Value Date/Time    INR 3.0 (H) 12/01/2021 04:40 AM    aPTT 47.0 (H) 11/05/2021 04:34 PM    Alk.  phosphatase 250 (H) 12/01/2021 04:40 AM    Protein, total 7.9 12/01/2021 04:40 AM    Albumin 3.1 (L) 12/01/2021 04:40 AM    Globulin 4.8 (H) 12/01/2021 04:40 AM       Lab Results   Component Value Date/Time    Iron 57 11/22/2018 05:27 AM    TIBC 234 (L) 11/22/2018 05:27 AM    Iron % saturation 24 11/22/2018 05:27 AM    Ferritin 9,604 (H) 11/27/2021 03:14 PM       Lab Results   Component Value Date/Time    C-Reactive protein 4.68 (H) 12/01/2021 04:40 AM    TSH 1.28 09/26/2014 08:32 PM        No results found for: PH, PHI, PCO2, PCO2I, PO2, PO2I, HCO3, HCO3I, FIO2, FIO2I    Lab Results   Component Value Date/Time     01/16/2019 07:09 AM    CK-MB Index 2.0 01/16/2019 07:09 AM    Troponin-I, Qt. <0.05 03/29/2021 05:06 AM    BNP 1,946 (H) 09/16/2014 04:17 AM        Lab Results   Component Value Date/Time    Culture result: NO GROWTH 4 DAYS 11/27/2021 02:05 PM    Culture result: NO GROWTH 4 DAYS 11/27/2021 01:40 PM    Culture result: NO GROWTH 5 DAYS 11/07/2021 09:50 AM       Lab Results   Component Value Date/Time    Hepatitis B surface Ag 0.50 12/01/2021 04:40 AM       Lab Results   Component Value Date/Time Vancomycin,trough 17.7 (H) 12/08/2011 02:59 AM    Vancomycin,trough 19.5 (H) 12/02/2011 02:40 AM     01/16/2019 07:09 AM    CK 62 05/20/2016 06:34 AM       Lab Results   Component Value Date/Time    Color RED 11/16/2012 05:15 PM    Appearance OPAQUE 11/16/2012 05:15 PM    Specific gravity 1.020 11/16/2012 05:15 PM    pH (UA) 8.0 11/16/2012 05:15 PM    Protein >300 (A) 11/16/2012 05:15 PM    Glucose 100 (A) 11/16/2012 05:15 PM    Ketone NEGATIVE  11/16/2012 05:15 PM    Bilirubin NEGATIVE  09/17/2012 01:35 AM    Blood MODERATE (A) 11/16/2012 05:15 PM    Urobilinogen 0.2 11/16/2012 05:15 PM    Nitrites NEGATIVE  11/16/2012 05:15 PM    Leukocyte Esterase NEGATIVE  11/16/2012 05:15 PM    WBC >100 (H) 11/16/2012 05:15 PM    RBC >100 (H) 11/16/2012 05:15 PM    Bacteria 3+ (A) 11/16/2012 05:15 PM     ·     Thanks for the consult.       Slime Pagan MD

## 2021-12-01 NOTE — PROGRESS NOTES
Problem: Airway Clearance - Ineffective  Goal: Achieve or maintain patent airway  Outcome: Progressing Towards Goal     Problem: Gas Exchange - Impaired  Goal: Absence of hypoxia  Outcome: Progressing Towards Goal  Goal: Promote optimal lung function  Outcome: Progressing Towards Goal     Problem: Breathing Pattern - Ineffective  Goal: Ability to achieve and maintain a regular respiratory rate  Outcome: Progressing Towards Goal     Problem:  Body Temperature -  Risk of, Imbalanced  Goal: Complications related to the disease process, condition or treatment will be avoided or minimized  Outcome: Progressing Towards Goal     Problem: Isolation Precautions - Risk of Spread of Infection  Goal: Prevent transmission of infectious organism to others  Outcome: Progressing Towards Goal     Problem: Nutrition Deficits  Goal: Optimize nutrtional status  Outcome: Progressing Towards Goal     Problem: Sepsis: Day 4  Goal: Activity/Safety  Outcome: Progressing Towards Goal  Goal: Consults, if ordered  Outcome: Progressing Towards Goal  Goal: Nutrition/Diet  Outcome: Progressing Towards Goal  Goal: Discharge Planning  Outcome: Progressing Towards Goal

## 2021-12-01 NOTE — PROGRESS NOTES
Hospitalist Progress Note    NAME: Sanjuana Velazco   :  1975   MRN:  [id number]       Assessment / Plan:  Acute Covid 19 infection syndrome POA  Hypoxemia, resolved  Recent Streptococcus intermedius bacteremia (2021)  -onset of symptoms past Wednesday 3 days PTA, not vaccinated  -initially required only 2L, now off oxygen and >97% on RA  -Rapid Covid test positive  -Blood culture NGTD  -continue to dose IV Vanco prn. Follow random levels. Per last discharge summary, plan is to continue Vanco until Dec 6.  -Procalcitonin 26 on admission, not clear why, will repeat in AM  -Repeat CXR in AM, having dialysis later today.     -trend inflammatory markers   -continue decadron.   -cefepime stopped (received -)  -lactic acidosis has been trending down since his last admission, 2 weeks ago  -should keep appointment with Dr Elver Cordoba, oral surgery, on 21    Abdominal pain  Transaminitis  -CT abdomen:  Nothing acute  -Abd US Uniform liver. Absent gallbladder without biliary dilation. Small quantity ascites  -acute hepatitis panel NEG  -LFTs are trending down. 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 Fentanyl not helping either. After some discussion, will allow dilaudid    GERD  -PPI.   GI cocktail has helped    Hx presumed endovascular ICD infection (S/P 2 AICD placements)  hx MRSE bacteremia 10/2020, presumed from femoral HD cath with discitis L1-2  hx MRSA bacteremia       Code Status: Full code  Surrogate Decision Maker: Sister     DVT Prophylaxis: Coumadin as above  GI Prophylaxis: PPI as at home     Baseline: Patient drives himself to dialysis units Monday Wednesday Friday from home, independent with ADLs, lives with sister      18.5 - 24.9 Normal weight / Body mass index is 22.05 kg/m². Estimated discharge date: Nov 30, 2021  Barriers: COVID-19, Improvement of transaminitis         Subjective:     Chief Complaint / Reason for Physician Visit  Patient seen and examined at the bedside. Patient is complaining of severe bilateral lower extremity weakness. He states that he is usually independent and is able to drive to dialysis and work. However, today he says that he will not be able during this things. He denies any numbness or tingling. He just states that they are very weak. He also continues to have pain in his abdomen and back and legs. He is tolerating a diet. He is complaining of severe GERD at this time. Discussed with RN events overnight. Review of Systems:  Symptom Y/N Comments  Symptom Y/N Comments   Fever/Chills    Chest Pain     Poor Appetite    Edema     Cough    Abdominal Pain     Sputum    Joint Pain     SOB/ZAMUDIO    Pruritis/Rash     Nausea/vomit    Tolerating PT/OT     Diarrhea    Tolerating Diet     Constipation    Other       Could NOT obtain due to:      Objective:     VITALS:   Last 24hrs VS reviewed since prior progress note.  Most recent are:  Patient Vitals for the past 24 hrs:   Temp Pulse Resp BP SpO2   12/01/21 1458 97.5 °F (36.4 °C) 72 18 107/73 98 %   12/01/21 1225  78  121/78    12/01/21 1031 97.6 °F (36.4 °C) 77 18 124/85 100 %   12/01/21 0919  76  114/78    12/01/21 0817 97.6 °F (36.4 °C) 76 18 110/77 100 %   12/01/21 0250 97.6 °F (36.4 °C) 92 18 137/83 100 %   12/01/21 0209  77   99 %   11/30/21 2352 97.8 °F (36.6 °C) 77 20 118/79 100 %   11/30/21 1951 97.6 °F (36.4 °C) 77 18 112/75 97 %       Intake/Output Summary (Last 24 hours) at 12/1/2021 1645  Last data filed at 12/1/2021 0323  Gross per 24 hour   Intake 200 ml   Output    Net 200 ml        I had a face to face encounter and independently examined this patient on 12/1/2021, as outlined below:  PHYSICAL EXAM:    General:          Alert, cooperative, no distress, appears stated age. HEENT:           Atraumatic, anicteric sclerae, pink conjunctivae                          No oral ulcers, mucosa moist, throat clear, dentition fair  Neck:               Supple, symmetrical,  thyroid: non tender  Lungs:             Clear to auscultation bilaterally. No Wheezing or Rhonchi. No rales. Chest wall:      No tenderness  No Accessory muscle use. Left chest wall AICD noted  Heart:              Regular  rhythm,  No  murmur   2+ LE edema bilaterally  Abdomen:        Soft, non-tender. Not distended. Bowel sounds normal  Extremities:     No cyanosis. No clubbing, left arm AV fistula noted with good thrill                          Skin turgor normal, Capillary refill normal, Radial dial pulse 2+  Skin:                Not pale. Not Jaundiced  No rashes   Psych:             Good insight. Not depressed. Not anxious or agitated. Neurologic:      EOMs intact. No facial asymmetry. No aphasia or slurred speech. Symmetrical strength, Sensation grossly intact. Alert and oriented X 4. Reviewed most current lab test results and cultures  YES  Reviewed most current radiology test results   YES  Review and summation of old records today    NO  Reviewed patient's current orders and MAR    YES  PMH/SH reviewed - no change compared to H&P  ________________________________________________________________________  Care Plan discussed with:    Comments   Patient     Family      RN     Care Manager     Consultant                        Multidiciplinary team rounds were held today with , nursing, pharmacist and clinical coordinator. Patient's plan of care was discussed; medications were reviewed and discharge planning was addressed. ________________________________________________________________________  Total NON critical care TIME:  31   Minutes    Total CRITICAL CARE TIME Spent:   Minutes non procedure based      Comments   >50% of visit spent in counseling and coordination of care     ________________________________________________________________________  Omer Landry MD     Procedures: see electronic medical records for all procedures/Xrays and details which were not copied into this note but were reviewed prior to creation of Plan. LABS:  I reviewed today's most current labs and imaging studies.   Pertinent labs include:  Recent Labs     12/01/21 0440 11/30/21 0031 11/29/21  0307   WBC 10.3 12.3* 10.9   HGB 9.7* 8.8* 8.6*   HCT 30.0* 27.2* 27.8*   PLT 88* 84* 100*     Recent Labs     12/01/21 0440 11/30/21  0031 11/29/21  0307   * 135* 132*   K 5.3* 4.9 6.0*   CL 96* 98 94*   CO2 25 23 16*   * 160* 89   BUN 67* 46* 61*   CREA 7.29* 5.71* 8.39*   CA 8.4* 8.0* 8.1*   MG 2.8* 2.4 2.6*   PHOS 5.5* 5.8* 8.0*   ALB 3.1* 2.9* 3.1*   TBILI 1.1* 1.0 1.8*   * 780* 584*   INR 3.0* 2.3* 2.3*       Signed: Omer Landry MD

## 2021-12-01 NOTE — PROGRESS NOTES
Transition of Care Plan:     RUR:24% high risk  Disposition:home   Follow up appointments:PCP, oral surgeon-Dr Horace Chow, [phone]- scheduled for Dec 23rd @10:30am  DME needed:PT/OT assessment is pending  Transportation at [id number]  [facility] will transport   [address] or means to access home:     Yes   IM Medicare Letter: Will provide upon d/c   Is patient a BCPI-A Bundle:                      If yes, was Bundle Letter given?:     Caregiver Contact:Sparkle jackson [phone]  Discharge Caregiver contacted prior to discharge? Initial note:  Chart reviewed. CM attempted to make a f/u appt. The office will call CM back for an appt. CM to continue to monitor for d/c needs.     Kat Addison, MSN  Care Manager  [phone]

## 2021-12-01 NOTE — PROGRESS NOTES
Problem: Airway Clearance - Ineffective  Goal: Achieve or maintain patent airway  Outcome: Progressing Towards Goal     Problem: Gas Exchange - Impaired  Goal: Absence of hypoxia  Outcome: Progressing Towards Goal  Goal: Promote optimal lung function  Outcome: Progressing Towards Goal     Problem: Breathing Pattern - Ineffective  Goal: Ability to achieve and maintain a regular respiratory rate  Outcome: Progressing Towards Goal     Problem: Body Temperature -  Risk of, Imbalanced  Goal: Ability to maintain a body temperature within defined limits  Outcome: Progressing Towards Goal  Goal: Will regain or maintain usual level of consciousness  Outcome: Progressing Towards Goal  Goal: Complications related to the disease process, condition or treatment will be avoided or minimized  Outcome: Progressing Towards Goal     Problem: Isolation Precautions - Risk of Spread of Infection  Goal: Prevent transmission of infectious organism to others  Outcome: Progressing Towards Goal     Problem: Nutrition Deficits  Goal: Optimize nutrtional status  Outcome: Progressing Towards Goal     Problem: Risk for Fluid Volume Deficit  Goal: Maintain normal heart rhythm  Outcome: Progressing Towards Goal  Goal: Maintain absence of muscle cramping  Outcome: Progressing Towards Goal  Goal: Maintain normal serum potassium, sodium, calcium, phosphorus, and pH  Outcome: Progressing Towards Goal     Problem: Loneliness or Risk for Loneliness  Goal: Demonstrate positive use of time alone when socialization is not possible  Outcome: Progressing Towards Goal     Problem: Fatigue  Goal: Verbalize increase energy and improved vitality  Outcome: Progressing Towards Goal     Problem: Patient Education: Go to Patient Education Activity  Goal: Patient/Family Education  Outcome: Progressing Towards Goal     Problem: Falls - Risk of  Goal: *Absence of Falls  Description: Document [place] Fall Risk and appropriate interventions in the flowsheet.   Outcome: Progressing Towards Goal  Note: Fall Risk Interventions:  Mobility Interventions: Bed/chair exit alarm, Communicate number of staff needed for ambulation/transfer, OT consult for ADLs, Patient to call before getting OOB, PT Consult for mobility concerns, PT Consult for assist device competence, Strengthening exercises (ROM-active/passive), Utilize walker, cane, or other assistive device, Utilize gait belt for transfers/ambulation    Mentation Interventions: Adequate sleep, hydration, pain control, Bed/chair exit alarm, Increase mobility, More frequent rounding, Room close to nurse's station, Toileting rounds, Update white board    Medication Interventions: Bed/chair exit alarm, Patient to call before getting OOB, Teach patient to arise slowly    Elimination Interventions: Bed/chair exit alarm, Call light in reach, Patient to call for help with toileting needs, Stay With Me (per policy), Toilet paper/wipes in reach, Toileting schedule/hourly rounds    History of Falls Interventions: Bed/chair exit alarm, Investigate reason for fall, Room close to nurse's station         Problem: Patient Education: Go to Patient Education Activity  Goal: Patient/Family Education  Outcome: Progressing Towards Goal     Problem: Sepsis: Day 4  Goal: Activity/Safety  Outcome: Progressing Towards Goal  Goal: Consults, if ordered  Outcome: Progressing Towards Goal  Goal: Diagnostic Test/Procedures  Outcome: Progressing Towards Goal  Goal: Nutrition/Diet  Outcome: Progressing Towards Goal  Goal: Discharge Planning  Outcome: Progressing Towards Goal  Goal: Medications  Outcome: Progressing Towards Goal  Goal: Respiratory  Outcome: Progressing Towards Goal  Goal: Treatments/Interventions/Procedures  Outcome: Progressing Towards Goal  Goal: Psychosocial  Outcome: Progressing Towards Goal  Goal: *Oxygen saturation within defined limits  Outcome: Progressing Towards Goal  Goal: *Hemodynamically stable  Outcome: Progressing Towards Goal  Goal: *Vital signs within defined limits  Outcome: Progressing Towards Goal  Goal: *Tolerating diet  Outcome: Progressing Towards Goal  Goal: *Demonstrates progressive activity  Outcome: Progressing Towards Goal  Goal: *Fluid volume maintenance  Outcome: Progressing Towards Goal

## 2021-12-01 NOTE — CONSULTS
IP Cardiology Consult       Date of consult:  11/30/21  Date of admission: 11/27/2021  Primary Cardiologist: Dr Emi Bhatti   Physician Requesting consult: Dr Thomas Sparrow:    Problem list:   - COVID PNA   - AWMI with PCI of LAD in 2008  - ICM EF 25%, EVER 3/2021 no signs of endocarditis, TTE 11/2021 EF 35%, sev pHTN  - Hx of ICD in 2009, subsequent lead fracture and device was turned off, s/p [employer] Sci Sub Q ICD, in 2021 Intravenous ICD was extracted due to recurrent bacteremia, EVER with no overt infection  - Hx of IVC filter due to DVT  - ESRD on HD, failed renal transplant x 2, home HD Dr. Dhara Duong  - Anemia  - Thrombocytopnia, hx of HIT  - Recurrent bacteremia, follows with ID  - Hx of non-compliance. - Abdominal pain      Has Chest pressure , which he says worse when swallow, also has abdominal tenderness - pain possibly GI etiology. EKG unremarkable, Trop down trending but repeat is pending. He was asking if he can get Fentanyl. Recommendations:    1. Repeat trop   2. Cont warfain, ASA   3. Cont toprol   4. Cont statin   5. Will give GI cocktail, zofran, cont PPI   6. If trop cont to trend down then no ischemic eval needed   7. Treatment of COVID per primary team , on Abx      Thank you for this consult and allowing me to take part in this patients care. Please call with questions. [x]        High complexity decision making was performed      CC / Reason for consult:  Chest pain     History of the presenting illness:  Rg Phillips is a 42 y.o. male with past medical history of end-stage renal disease on dialysis, hypertension, CAD, DVT, systolic CHF with cardiomyopathy, gout. Bactermia recently had EVER and discharged recently 2 wks ago on 11/18/2021, on Abx with HD. Resented to hospital with myalgia, weakness and found to have COVID vaccine. Blood culture negative.      This afternoon has chest pain, mid sternal to left side, worse when tried to swallow and also has worse when ask to sit up. Has tenderness on LUQ of abdomen. Had indigestion yesterday and this am but no pain. No SOB, feels nausea. Pain did not improve with nitro. Trop down trended from admission. No current pain but when I asked him to sit up says pain started. Past Medical History:   Diagnosis Date    Abdominal hematoma     AICD (automatic cardioverter/defibrillator) present     CAD (coronary artery disease)     anterior MI s/p 2 stents  2007    Chronic abdominal pain     Chronic kidney disease     ESRD; Dialysis dependent. Home [facility] does labs- [facility] tpke    DVT (deep venous thrombosis) ([address] 2001    DVT of popliteal vein ([address] 11/2011    not anticoagulated due to bleeding, IVC filter    Endocarditis     Gallstone pancreatitis     Gastrointestinal disorder     acid reflux    Gastrointestinal disorder     peptic ulcer    Hemodialysis patient ([address]     High cholesterol     Hypertension     Kidney transplant     b/l kidneys 1995, 2001    Long term current use of anticoagulant therapy     Nephrotic syndrome     Other ill-defined conditions(799.89)     kidny transplant x2,  on dialysis    Other ill-defined conditions(799.89)     home dialysis    Other ill-defined conditions(799.89)     hx recurrent left leg DVT    Peritonitis ([address]     Seizures ([address] 2015    most recent- only had three in lifetime    Small bowel obstruction (HCC)     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    V[facility]tach ([facility]        Prior to Admission medications    Medication Sig Start Date End Date Taking? Authorizing Provider   vancomycin in 0.9% sodium chloride (VANCOCIN) 1 gram/200 mL pgbk 150 mL by IntraVENous route every Monday, Wednesday, Friday for 8 doses.  After hemodialysis treatment 11/19/21 12/7/21  Sebastian De La Paz MD   ondansetron (Zofran ODT) 4 mg disintegrating tablet 1 Tab by SubLINGual route every eight (8) hours as needed for Nausea or Vomiting. 3/27/21   Santi Jimenez MD metoprolol succinate (TOPROL-XL) 25 mg XL tablet Take 0.5 Tabs by mouth daily. 11/10/20   Jennifer Horne MD   calcium acetate,phosphat bind, (PHOSLO) 667 mg cap Take 2 Caps by mouth three (3) times daily (with meals). Indications: low amount of calcium in the blood, renal osteodystrophy with hyperphosphatemia 11/10/20   Jennifer Horne MD   atorvastatin (Lipitor) 20 mg tablet Take 20 mg by mouth daily. Provider, Historical   warfarin (COUMADIN) 2.5 mg tablet Take 2.5 mg by mouth daily. Provider, Historical   acetaminophen (TYLENOL) 500 mg tablet Take 1 Tab by mouth every four (4) hours as needed for Pain. Over the counter 1/20/19   Guru Dunham MD   omeprazole (PRILOSEC) 20 mg capsule Take 20 mg by mouth daily. Provider, Historical   calcitRIOL (ROCALTROL) 0.5 mcg capsule Take 0.5 mcg by mouth daily. Provider, Historical   aspirin 81 mg chewable tablet Take 81 mg by mouth daily.     Other, MD Morro       Family History   Problem Relation Age of Onset    COPD Mother     Lung Disease Mother     Cancer Father         stomach    Cancer Maternal Grandmother           Social History     Socioeconomic History    Marital status: SINGLE     Spouse name: Not on file    Number of children: Not on file    Years of education: Not on file    Highest education level: Not on file   Occupational History    Not on file   Tobacco Use    Smoking status: Never Smoker    Smokeless tobacco: Never Used   Substance and Sexual Activity    Alcohol use: No    Drug use: Yes     Types: Prescription, OTC    Sexual activity: Not on file   Other Topics Concern    Not on file   Social History Narrative    Not on file     Social Determinants of Health     Financial Resource Strain:     Difficulty of Paying Living Expenses: Not on file   Food Insecurity:     Worried About Running Out of Food in the Last Year: Not on file    Marleny of Food in the Last Year: Not on file   Transportation Needs:     Lack of Transportation (Medical): Not on file    Lack of Transportation (Non-Medical):  Not on file   Physical Activity:     Days of Exercise per Week: Not on file    Minutes of Exercise per Session: Not on file   Stress:     Feeling of Stress : Not on file   Social Connections:     Frequency of Communication with Friends and Family: Not on file    Frequency of Social Gatherings with Friends and Family: Not on file    Attends Judaism Services: Not on file    Active Member of [address] [place] [address] or Organizations: Not on file    Attends Club or Organization Meetings: Not on file    Marital Status: Not on file   Intimate Partner Violence:     Fear of Current or Ex-Partner: Not on file    Emotionally Abused: Not on file    Physically Abused: Not on file    Sexually Abused: Not on file   Housing Stability:     Unable to Pay for Housing in the Last Year: Not on file    Number of [place] in the Last Year: Not on file    Unstable Housing in the Last Year: Not on file         ROS      Total of 12 systems reviewed, all systems review was negative except Pertinent Positives included in HPI     Visit Vitals  /75 (BP 1 Location: Right upper arm, BP Patient Position: At rest)   Pulse 77   Temp 97.6 °F (36.4 °C)   Resp 18   Ht 5' 7\" (1.702 m)   Wt 64.6 kg (142 lb 6.4 oz)   SpO2 97%   BMI 22.30 kg/m²       Physical Exam  Examination:     General: Alert + Oriented x3, no acute distress   HEENT: Normocephalic aromatic, MMM   Neck: Supple, JVP- not well appreciated   RS: Non labored, crackles   CVS: Regular rate and rhythm, H5S9,  Systolic murmur   Abd: Soft, + LUQ tender, non distended   Lower extremity: Warm to touch, Edema- trace   Skin: Warm and dry, No significant bruises or rash   CNS: Oriented x3, no focal neuro deficit     Lab review:  BMP:   Lab Results   Component Value Date/Time     (L) 11/30/2021 12:31 AM    K 4.9 11/30/2021 12:31 AM    CL 98 11/30/2021 12:31 AM    CO2 23 11/30/2021 12:31 AM    AGAP 14 11/30/2021 12:31 AM     (H) 11/30/2021 12:31 AM    BUN 46 (H) 11/30/2021 12:31 AM    CREA 5.71 (H) 11/30/2021 12:31 AM    GFRAA 13 (L) 11/30/2021 12:31 AM    GFRNA 11 (L) 11/30/2021 12:31 AM        CBC:  Lab Results   Component Value Date/Time    WBC 12.3 (H) 11/30/2021 12:31 AM    Hemoglobin (POC) 10.5 (L) 11/14/2017 07:00 AM    HGB 8.8 (L) 11/30/2021 12:31 AM    Hematocrit (POC) 36 (L) 09/08/2020 06:46 AM    HCT 27.2 (L) 11/30/2021 12:31 AM    PLATELET 84 (L) 54/88/5022 12:31 AM    MCV 94.4 11/30/2021 12:31 AM       All Cardiac Markers in the last 24 hours:  No results found for: CPK, CK, CKMMB, CKMB, RCK3, CKMBT, CKMBPOC, CKNDX, CKND1, CAMERON, TROPT, TROIQ, EMERY, TROPT, TNIPOC, BNP, BNPP, BNPNT    Data review:    Tele:   NSR    EKG tracing personally reviewed:   NSR, NSTT abn     Echocardiogram:  Result status: Final result  · No signs for endocarditis by EVER. · LV: Estimated LVEF is 35 - 40%. Normal cavity size. Mildly increased wall thickness. Moderately reduced systolic function. · MV: Mitral valve leaflet calcification. Mild mitral annular calcification. 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 · TV: Moderate tricuspid valve regurgitation is present. · PA: Moderate pulmonary hypertension. Other cardiac testing:    Other imaging:  US abd   IMPRESSION  Uniform liver. Absent gallbladder without biliary dilation. Small  quantity ascites    CT abd   IMPRESSION  1. Relatively small right pleural effusion and minimal left pleural effusion,  increased since 11/5/2021.  2. No acute findings in the abdomen or pelvis. .   3. Renal atrophy and calcified transplant kidneys. 4. Hepatomegaly. No focal lesion. 5. Small amount of ascites.  Anasarca.       Signed:  Johnny Bamberger MD  Interventional Cardiology  11/30/2021

## 2021-12-01 NOTE — PROGRESS NOTES
Received message from patient's nurse stating:    Patient requesting IV fentanyl for pain         Discussion / orders:    Patient has had a rapid response yesterday when he complained of chest pain. He was evaluated by attending provider and was not deemed to be having a STEMI. Cardiology was consulted and was seen by Dr. Fidelina Wasserman cardiologist who recommended repeat of troponin, continue warfarin and aspirin, continue Toprol, continue statin, to give GI cocktail and Zofran and to continue PPI and if troponin continues to trend down then there would be no need for ischemic evaluation. Troponin is slowly trending down  Ordered one-time dose of fentanyl 25 mcg IV         Please note that this note was dictated using Dragon computer voice recognition software. Quite often unanticipated grammatical, syntax, homophones, and other interpretive errors are inadvertently transcribed by the computer software. Please disregard these errors. Please excuse any errors that have escaped final proofreading.

## 2021-12-01 NOTE — PROGRESS NOTES
Pharmacy Antimicrobial Kinetic Dosing    Indication for Antimicrobials: bloodstream infection - hx of recent strep intermedius bacteremia ; Covid-19    Current Regimen of Each Antimicrobial:  Vancomycin 1000 mg after HD (Start Date ; Day 5)    Previous Antimicrobial Therapy:  Cefepime 1 g IV q24h (-)  Vancomycin 750 mg Mon, Wed, Fri after HD    Goal Level: VANCOMYCIN TROUGH GOAL RANGE    Vancomycin Trough: 20 - 25 mcg/mL    Date Dose & Interval Measured (mcg/mL) Predicted AUC/ZECHARIAH    750 mg post-HD 19                  Date & time of next level:     Significant Positive Cultures:    blood: NGTD    Labs:  Recent Labs     210 21  0031 21  0307   CREA 7.29* 5.71* 8.39*   BUN 67* 46* 61*     Recent Labs     210 21  00321  0307   WBC 10.3 12.3* 10.9     Temp (24hrs), Av.7 °F (36.5 °C), Min:97.5 °F (36.4 °C), Max:98.2 °F (36.8 °C)    Conditions for Dosing Consideration: ESRD on HD MWF    Impression/Plan:   Vancomycin random level slightly low at 19. Increase to 1000 mg after each HD session  Antimicrobial stop date      Pharmacy will follow daily and adjust medications as appropriate for renal function and/or serum levels.     Thank you,  Renita Rasmussen, PHARMD

## 2021-12-01 NOTE — PROGRESS NOTES
0700: Bedside shift report received from [place] [id number] [facility]. Report consisted of SBAR, Kardex, MAR, recent results,  Intake/output and cardiac rhythm. 0915: Patient complaining of pain; will give Roxicodone. 1015: Unsuccessful lactic draw on patient. 1130: Patient lactic drawn and sent to lab.     1222: Lactic acid result of 2.8. Dr. Ambar Sims notified    [phone]: Patient given nitro for chest pain. Will continue to monitor patient pain. MD aware. 1307: CXR completed. 1745: Unsuccessful attempt at lactic acid draw. Will have another nurse attempt. 1830Isreal England RN geoffrey lactic; sent to lab for processing. End of Shift Note    Bedside shift change report given to MANUELITO Good (oncoming nurse) by Olga Guardado RN (offgoing nurse). Report included the following information SBAR, Kardex, Intake/Output, MAR, Recent Results and Cardiac Rhythm sinus rhythm    Shift worked:  [id number]     Shift summary and any significant changes:     Patient continues to have ABD and chest pain. 0919: patient given Roxicodone for pain score. 1356: One time dose of fentanyl given for pain score of 9; will continue to monitor     1824: Patient given dilaudid for pain score of 9; will continue to monitor. CXR completed. Patient worked with PT/OT    [phone]: contacted dialysis to confirm patient is on schedule for today. Informed that patient is on schedule and will receive dialysis later this evening around midnight     Concerns for physician to address:       Zone phone for oncoming shift:          Activity:  Activity Level:  Up with Assistance  Number times ambulated in hallways past shift: 0  Number of times OOB to chair past shift: 1    Cardiac:   Cardiac Monitoring: Yes      Cardiac Rhythm: Sinus Rhythm    Access:   Current line(s): PIV     Genitourinary:   Urinary status: anuric    Respiratory:   O2 Device: None (Room air)  Chronic home O2 use?: NO  Incentive spirometer at bedside: NO     GI:  Last Bowel Movement Date: 12/01/21  Current diet:  ADULT DIET Regular; Low Fat/Low Chol/High Fiber/2 gm Na; Low Potassium (Less than 3000 mg/day); Low Phosphorus (Less than 1000 mg)  Passing flatus: YES  Tolerating current diet: YES       Pain Management:   Patient states pain is manageable on current regimen: YES    Skin:  Yong Score: 20  Interventions: increase time out of bed, PT/OT consult and limit briefs    Patient Safety:  Fall Score:  Total Score: 4  Interventions: bed/chair alarm, assistive device (walker, cane, etc), gripper socks and pt to call before getting OOB  High Fall Risk: Yes    Length of Stay:  Expected LOS: 5d 9h  Actual LOS: [address] RN

## 2021-12-02 ENCOUNTER — APPOINTMENT (OUTPATIENT)
Dept: GENERAL RADIOLOGY | Age: 46
DRG: 207 | End: 2021-12-02
Attending: INTERNAL MEDICINE
Payer: MEDICARE

## 2021-12-02 LAB
ALBUMIN SERPL-MCNC: 3.2 G/DL (ref 3.5–5)
ALBUMIN/GLOB SERPL: 0.7 {RATIO} (ref 1.1–2.2)
ALP SERPL-CCNC: 257 U/L (ref 45–117)
ALT SERPL-CCNC: 426 U/L (ref 12–78)
ANION GAP SERPL CALC-SCNC: 6 MMOL/L (ref 5–15)
AST SERPL-CCNC: 274 U/L (ref 15–37)
BILIRUB SERPL-MCNC: 2.1 MG/DL (ref 0.2–1)
BUN SERPL-MCNC: 39 MG/DL (ref 6–20)
BUN/CREAT SERPL: 8 (ref 12–20)
CALCIUM SERPL-MCNC: 8.6 MG/DL (ref 8.5–10.1)
CHLORIDE SERPL-SCNC: 97 MMOL/L (ref 97–108)
CO2 SERPL-SCNC: 31 MMOL/L (ref 21–32)
COMMENT, HOLDF: NORMAL
CREAT SERPL-MCNC: 5.05 MG/DL (ref 0.7–1.3)
CRP SERPL-MCNC: 3.2 MG/DL (ref 0–0.6)
D DIMER PPP FEU-MCNC: 27.33 MG/L FEU (ref 0–0.65)
FERRITIN SERPL-MCNC: 3127 NG/ML (ref 26–388)
GLOBULIN SER CALC-MCNC: 4.9 G/DL (ref 2–4)
GLUCOSE SERPL-MCNC: 118 MG/DL (ref 65–100)
INR PPP: 3.8 (ref 0.9–1.1)
LACTATE SERPL-SCNC: 1.6 MMOL/L (ref 0.4–2)
LIPASE SERPL-CCNC: 704 U/L (ref 73–393)
POTASSIUM SERPL-SCNC: 3.9 MMOL/L (ref 3.5–5.1)
PROCALCITONIN SERPL-MCNC: 7.92 NG/ML
PROT SERPL-MCNC: 8.1 G/DL (ref 6.4–8.2)
PROTHROMBIN TIME: 37.2 SEC (ref 9–11.1)
SAMPLES BEING HELD,HOLD: NORMAL
SODIUM SERPL-SCNC: 134 MMOL/L (ref 136–145)

## 2021-12-02 PROCEDURE — 74011250636 HC RX REV CODE- 250/636: Performed by: INTERNAL MEDICINE

## 2021-12-02 PROCEDURE — 86140 C-REACTIVE PROTEIN: CPT

## 2021-12-02 PROCEDURE — 82728 ASSAY OF FERRITIN: CPT

## 2021-12-02 PROCEDURE — 84145 PROCALCITONIN (PCT): CPT

## 2021-12-02 PROCEDURE — 36415 COLL VENOUS BLD VENIPUNCTURE: CPT

## 2021-12-02 PROCEDURE — 74011250637 HC RX REV CODE- 250/637: Performed by: INTERNAL MEDICINE

## 2021-12-02 PROCEDURE — 80053 COMPREHEN METABOLIC PANEL: CPT

## 2021-12-02 PROCEDURE — 65270000029 HC RM PRIVATE

## 2021-12-02 PROCEDURE — 85610 PROTHROMBIN TIME: CPT

## 2021-12-02 PROCEDURE — 71045 X-RAY EXAM CHEST 1 VIEW: CPT

## 2021-12-02 PROCEDURE — 83605 ASSAY OF LACTIC ACID: CPT

## 2021-12-02 PROCEDURE — 97530 THERAPEUTIC ACTIVITIES: CPT | Performed by: OCCUPATIONAL THERAPIST

## 2021-12-02 PROCEDURE — 83690 ASSAY OF LIPASE: CPT

## 2021-12-02 PROCEDURE — 97535 SELF CARE MNGMENT TRAINING: CPT | Performed by: OCCUPATIONAL THERAPIST

## 2021-12-02 PROCEDURE — 85379 FIBRIN DEGRADATION QUANT: CPT

## 2021-12-02 RX ORDER — HYDROMORPHONE HYDROCHLORIDE 1 MG/ML
2 INJECTION, SOLUTION INTRAMUSCULAR; INTRAVENOUS; SUBCUTANEOUS
Status: DISCONTINUED | OUTPATIENT
Start: 2021-12-02 | End: 2021-12-07

## 2021-12-02 RX ORDER — SUCRALFATE 1 G/1
1 TABLET ORAL
Status: DISCONTINUED | OUTPATIENT
Start: 2021-12-02 | End: 2021-12-06

## 2021-12-02 RX ADMIN — OXYCODONE 5 MG: 5 TABLET ORAL at 01:45

## 2021-12-02 RX ADMIN — HYDROMORPHONE HYDROCHLORIDE 1 MG: 1 INJECTION, SOLUTION INTRAMUSCULAR; INTRAVENOUS; SUBCUTANEOUS at 07:08

## 2021-12-02 RX ADMIN — HYDROMORPHONE HYDROCHLORIDE 1 MG: 1 INJECTION, SOLUTION INTRAMUSCULAR; INTRAVENOUS; SUBCUTANEOUS at 02:58

## 2021-12-02 RX ADMIN — METOPROLOL SUCCINATE 12.5 MG: 25 TABLET, EXTENDED RELEASE ORAL at 12:48

## 2021-12-02 RX ADMIN — CALCIUM CARBONATE (ANTACID) CHEW TAB 500 MG 200 MG: 500 CHEW TAB at 12:50

## 2021-12-02 RX ADMIN — Medication 1 CAPSULE: at 12:48

## 2021-12-02 RX ADMIN — Medication 10 ML: at 06:37

## 2021-12-02 RX ADMIN — DEXAMETHASONE 6 MG: 4 TABLET ORAL at 12:50

## 2021-12-02 RX ADMIN — CALCIUM ACETATE 1334 MG: 667 CAPSULE ORAL at 17:15

## 2021-12-02 RX ADMIN — ZINC SULFATE 220 MG (50 MG) CAPSULE 1 CAPSULE: CAPSULE at 12:49

## 2021-12-02 RX ADMIN — FAMOTIDINE 20 MG: 20 TABLET, FILM COATED ORAL at 12:52

## 2021-12-02 RX ADMIN — CALCIUM CARBONATE (ANTACID) CHEW TAB 500 MG 200 MG: 500 CHEW TAB at 17:15

## 2021-12-02 RX ADMIN — OXYCODONE HYDROCHLORIDE AND ACETAMINOPHEN 500 MG: 500 TABLET ORAL at 12:51

## 2021-12-02 RX ADMIN — Medication 10 ML: at 17:14

## 2021-12-02 RX ADMIN — Medication 10 ML: at 21:37

## 2021-12-02 RX ADMIN — PANTOPRAZOLE SODIUM 40 MG: 40 TABLET, DELAYED RELEASE ORAL at 12:56

## 2021-12-02 RX ADMIN — HYDROMORPHONE HYDROCHLORIDE 1 MG: 1 INJECTION, SOLUTION INTRAMUSCULAR; INTRAVENOUS; SUBCUTANEOUS at 12:51

## 2021-12-02 RX ADMIN — ASPIRIN 81 MG CHEWABLE TABLET 81 MG: 81 TABLET CHEWABLE at 12:50

## 2021-12-02 RX ADMIN — SUCRALFATE 1 G: 1 TABLET ORAL at 17:15

## 2021-12-02 RX ADMIN — VANCOMYCIN HYDROCHLORIDE 1000 MG: 1 INJECTION, POWDER, LYOPHILIZED, FOR SOLUTION INTRAVENOUS at 17:15

## 2021-12-02 RX ADMIN — GUAIFENESIN 600 MG: 600 TABLET, EXTENDED RELEASE ORAL at 12:48

## 2021-12-02 RX ADMIN — CALCIUM ACETATE 1334 MG: 667 CAPSULE ORAL at 12:50

## 2021-12-02 RX ADMIN — GUAIFENESIN 600 MG: 600 TABLET, EXTENDED RELEASE ORAL at 21:36

## 2021-12-02 RX ADMIN — CALCITRIOL CAPSULES 0.25 MCG 0.5 MCG: 0.25 CAPSULE ORAL at 12:48

## 2021-12-02 RX ADMIN — HYDROMORPHONE HYDROCHLORIDE 2 MG: 1 INJECTION, SOLUTION INTRAMUSCULAR; INTRAVENOUS; SUBCUTANEOUS at 17:15

## 2021-12-02 RX ADMIN — SUCRALFATE 1 G: 1 TABLET ORAL at 21:36

## 2021-12-02 RX ADMIN — HYDROMORPHONE HYDROCHLORIDE 2 MG: 1 INJECTION, SOLUTION INTRAMUSCULAR; INTRAVENOUS; SUBCUTANEOUS at 21:37

## 2021-12-02 NOTE — PROGRESS NOTES
Problem: Self Care Deficits Care Plan (Adult)  Goal: *Acute Goals and Plan of Care (Insert Text)  Description:   FUNCTIONAL STATUS PRIOR TO ADMISSION: Patient was independent and active without use of DME. Pt reports doing his own home HD.    HOME SUPPORT: The patient lived with sister but did not require assist.    Occupational Therapy Goals  Initiated 11/28/2021  1. Patient will perform grooming standing sinkside with CGA/minimal assist within 7 day(s). 2.  Patient will perform bathing in supported sitting with supervision/set-up within 7 day(s). 3.  Patient will perform lower body dressing with minimal assistance/contact guard assist within 7 day(s). 4.  Patient will perform toilet transfers with minimal assistance/contact guard assist within 7 day(s). 5.  Patient will perform all aspects of toileting with supervision/set-up within 7 day(s). 6.  Patient will participate in upper extremity therapeutic exercise/activities with supervision/set-up for 15 minutes within 7 day(s). 7.  Patient will utilize energy conservation techniques during functional activities with verbal cues within 7 day(s). Outcome: Progressing Towards Goal    OCCUPATIONAL THERAPY TREATMENT  Patient: Claudell Patch (69 y.o. male)  Date: 12/2/2021  Diagnosis: Acute COVID-19 [U07.1]   <principal problem not specified>       Precautions: Contact, Fall, Skin (COVID)  Chart, occupational therapy assessment, plan of care, and goals were reviewed. ASSESSMENT  Patient continues to be motivated and eager to regain his functional independence. He is demonstrating improving strength, standing balance and activity tolerance for the performance of functional activity. Overall he was SBA for transfers, ambulation, LB dressing, standing grooming and toileting today. Minimal cueing was needed for safety during functional mobility, but patient was receptive. No LOB occurred during standing ADLs or ambulation this session.  At this time the patient continues to benefit from acute OT and he will need Rehab VS. HHOT, PT and supervision for ADLs and functional mobility after discharge, depending on his continued progress. PLAN :  Patient continues to benefit from skilled intervention to address the above impairments. Continue treatment per established plan of care. to address goals. Recommendation for discharge: (in order for the patient to meet his/her long term goals)  To be determined: Rehab VS. Home with [address] [place] [address], PT and supervision for ADLs and functional mobility, pending continued progress      Equipment recommendations for successful discharge (if) home: likely none           OBJECTIVE DATA SUMMARY:   Cognitive/Behavioral Status:  Neurologic State: Alert  Orientation Level: Oriented X4  Cognition: Appropriate decision making; Appropriate for age attention/concentration; Follows commands        Safety/Judgement: Awareness of environment; Insight into deficits; Decreased awareness of need for safety    Functional Mobility and Transfers for ADLs:  Bed Mobility:  Presented up in chair  Scooting: Independent    Transfers:  Sit to Stand: Stand-by assistance  Functional Transfers  Bathroom Mobility: Stand-by assistance (ambulating without an AD. )  Toilet Transfer : Stand-by assistance       Balance:  Sitting: Intact  Standing: Impaired  Standing - Static: Good  Standing - Dynamic : Good    ADL Intervention:  Grooming  Position Performed: Standing  Washing Hands: Supervision    Lower Body Dressing Assistance  Underpants: Stand-by assistance  Leg Crossed Method Used: Yes  Position Performed: Seated in chair; Standing  Cues: Verbal cues provided    Toileting  Toileting Assistance: Stand-by assistance  Clothing Management: Stand-by assistance    Cognitive Retraining  Safety/Judgement: Awareness of environment; Insight into deficits;  Decreased awareness of need for safety    Therapeutic Exercise:  Patient performed 2 sets, 5 reps of isometric transverse abdominus and gluteus contractions, in order to address core strengthening for improved functional standing balance. Activity Tolerance:   Fair  BP stable with positional changes, s/p HD earlier today.   SpO2 stable on RA    After treatment patient left in no apparent distress:   Sitting in chair and Call bell within reach    COMMUNICATION/COLLABORATION:   The patients plan of care was discussed with: Registered Nurse    Winsome Camargo, OTR/L  Time Calculation: 23 mins

## 2021-12-02 NOTE — PROGRESS NOTES
Name: Jean Jones   : 1975 Admit Date: 2021   Phone: [phone]  Room: [facility]/   PCP: Kiana Steiner MD  MRN: [id number]   Date: 2021  Code: Full Code          IMPRESSION  · Acute Respiratory Failure with Hypoxia- now on room air  · Covid-19; CXR now with interstitial edema , CRP still 4-5 despite steroids  · Unvaccinated for COVID  · Recurrent chest pain- unlikely PE, therapeutic Warfarin; given bad GERD, steroids would be concerned about developing erosive gastritis or esophagitis; Pulmonary HTN can be associated with atypical chest pain  · Elevated procalcitonin despite abx- source? · ESRD on HD  · Chronic abdominal apin  · CM s/p ICD placement  · Severe Pulmonary HTN   · CHF LVEF 35-40%  · Left arm swelling- s/p angioplasty of left subclavian vein  · Gram positive sepsis from dental abcess  · s/p lead extraction at [facility]  · Anemia of ckd  · Hx of renal tx in past times 2.  · Hypertension  · CAD  · Failed RTX times 2  · H/o DVT, s/p ivc filter/ h/o HIT   · Contrast allergy in history- no details     PLAN  · CXR after HD  · Check exercise sats- desaturations can be early warning sign of Covid pneumonia  · Lipase  · Continue close observation  · Prn Supplemental oxygen to keep sats >90%  · If no desaturation and CXR clears after dialysis can stop Dexamethasone; low threshold to increase if he worsens  · Start Baricitinib if oxygen requirements increase  · Treat GERD  · On Cefepime  · Renal following  · Coumadin dosed per pharmacy  · Trend inflammatory markers  · Encourage IS use, proning  · At high risk for decompensation given his comorbidities   · COVID vaccine counseling done      21: On HD. No SOB. On Room air. Discussed with dr. Hernesto Ronquillo. Procalcitonin was very high. Notes and data reviewed. No nausea. Denies cough congestion. Now on room air. Denies shortness of breath at rest.  Need to monitor him closely.   CXR now with mild interstitial edema vs early COVID infiltrates? If he starts needing O2 and CRP higher, will add Baricitinib. Would exercise and seee if he drops sats. If CXR improves after HD, likely pulmonary edema. 12/1/21: On Decadron. Chest and abdominal pain all night. Notes and data reviewed. No nausea. Denies cough congestion. Now on room air. Denies shortness of breath at rest.  Need to monitor him closely. Course is unpredictable. CRP same but CXR now with mild interstitial edema vs early COVID infiltrates? If he starts needing O2 and CRP higher, will add Baricitinib      11/30/21: Lots of heartburn this AM. On Decadron. NOtes and data reviewed. No nausea. Denies cough congestion. Now on room air. Denies shortness of breath at rest.  Need to monitor him closely. Course is unpredictable. If CRP normal in AM, would allow us to be more comfortable if discharged on steroids. If CRP higher, will add Baricitinib     11/29/21: Events of weekend, Notes and data reviewed. Patient now starting hemodialysis. Has back and abdominal discomfort. No nausea. Denies cough congestion. Now on room air. Denies shortness of breath at rest.  Explained to the patient the potential of him getting much worse. Need to monitor him closely. Course is unpredictable. Currently on some treatment. Not a candidate for Remdesivir. Chart and notes reviewed. Data reviewed. I review the patient's current medications in the medical record at each encounter.  I have evaluated and examined the patient. 8:40 AM       History was obtained from patient. I was asked by Albino Dyer MD to see Edu Carbajal in consultation for a chief complaint of Covid-19 infection. History of Present Illness:   if a 41 yo gentleman with a PMH ESRD on HD MWF, hypertension, ischemic cardiomyopathy s/pp ICD placement, and GERD that presented to the ED on 11/27 with worsening fatigue and SOB. Rapid covid test positive.  He is unvaccinated against Covid-19. He is currently on RA on my exam today with sats at 96%. Feels a little better. Images:      CXR 11/27: no acute process    Abdominal CT 11/27:  Relatively small right pleural effusion and minimal left pleural effusion, increased since 11/5/2021. Mya Akbar No acute findings in the abdomen or pelvis. .  Renal atrophy and calcified transplant kidneys. Hepatomegaly. No focal lesion. Small amount of ascites. Anasarca. D-dimer 3.15  Lactic acid 8.5  Trop 519  Ferritin 9604  Procalcitonin 26.76  CRP 9.91  Blood cultures negative x 18 hours  ECHO 11/7/21: EF 35-40%, mildly dilated LA, mildly dilated RV, mildly reduced systolic function, trave MVR, moderate TVR. Severe pulmonary hypertension RVSP 85mmHg,     Past Medical History:   Diagnosis Date    Abdominal hematoma     AICD (automatic cardioverter/defibrillator) present     CAD (coronary artery disease)     anterior MI s/p 2 stents  2007    Chronic abdominal pain     Chronic kidney disease     ESRD; Dialysis dependent.  Home [facility] does labs- [facility] tpke    DVT (deep venous thrombosis) ([address] 2001    DVT of popliteal vein ([address] 11/2011    not anticoagulated due to bleeding, IVC filter    Endocarditis     Gallstone pancreatitis     Gastrointestinal disorder     acid reflux    Gastrointestinal disorder     peptic ulcer    Hemodialysis patient ([address]     High cholesterol     Hypertension     Kidney transplant     b/l kidneys 1995, 2001    Long term current use of anticoagulant therapy     Nephrotic syndrome     Other ill-defined conditions(799.89)     kidny transplant x2,  on dialysis    Other ill-defined conditions(799.89)     home dialysis    Other ill-defined conditions(799.89)     hx recurrent left leg DVT    Peritonitis ([address]     Seizures ([address] 2015    most recent- only had three in lifetime    Small bowel obstruction (HCC)     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    V-tach ([address]        Past Surgical History:   Procedure Laterality Date    HX APPENDECTOMY      HX CHOLECYSTECTOMY      HX OTHER SURGICAL  11/2011    exploratory laparotomy    HX OTHER SURGICAL      fem-pop    HX OTHER SURGICAL  02/2013    right upper extremity fistula    HX PACEMAKER Left 6/2009    AICD-st latonya-not connected    HX PACEMAKER Left     AICD-left side-BS-working    HX SMALL BOWEL RESECTION      HX TRANSPLANT  2001     Kidney    HX TRANSPLANT  1992    kidney    HX VASCULAR ACCESS Right     femoral access for HD- does 5 day dialysis @ home    IR REMOVE TUNL CVAD W/O PORT / PUMP  10/29/2020    IR REPLACE CVC TUNNELED W/O PORT  9/10/2020    DE CARDIAC SURG PROCEDURE UNLIST  2007    2 stents    DE EDG US EXAM SURGICAL ALTER STOM DUODENUM/JEJUNUM  7/15/2011         DE ESOPHAGOGASTRODUODENOSCOPY TRANSORAL DIAGNOSTIC  5/24/2012         VASCULAR SURGERY PROCEDURE UNLIST  11/14/2017    Insertion AV graft right upper arm (bovine); ligation of AV fistula right arm       Family History   Problem Relation Age of Onset    COPD Mother     Lung Disease Mother     Cancer Father         stomach    Cancer Maternal Grandmother        Social History     Tobacco Use    Smoking status: Never Smoker    Smokeless tobacco: Never Used   Substance Use Topics    Alcohol use: No       Allergies   Allergen Reactions    Shellfish Containing Products Swelling     Break out in rash, trouble breathing    Contrast Agent [Iodine] Shortness of Breath    Heparin Analogues Other (comments)     Positive history of HIT       Current Facility-Administered Medications   Medication Dose Route Frequency    Warfarin- Hold dose tonight 12/2   Other ONCE    metoprolol succinate (TOPROL-XL) XL tablet 12.5 mg  12.5 mg Oral DAILY    epoetin emilie-epbx (RETACRIT) 12,000 Units combo injection  12,000 Units SubCUTAneous Q MON, WED & FRI    HYDROmorphone (DILAUDID) injection 1 mg  1 mg IntraVENous Q4H PRN    calcium carbonate (TUMS) chewable tablet 200 mg [elemental]  200 mg Oral TID WITH MEALS    aluminum-magnesium hydroxide (MAALOX) oral suspension 15 mL  15 mL Oral QID PRN    famotidine (PEPCID) tablet 20 mg  20 mg Oral DAILY    nitroglycerin (NITROSTAT) tablet 0.4 mg  0.4 mg SubLINGual PRN    sodium chloride (NS) flush 5-10 mL  5-10 mL IntraVENous PRN    acetaminophen (TYLENOL) tablet 650 mg  650 mg Oral Q6H PRN    L.acidophilus-paracasei-S.thermophil-bifidobacter (RISAQUAD) 8 billion cell capsule  1 Capsule Oral DAILY    aspirin chewable tablet 81 mg  81 mg Oral DAILY    calcitRIOL (ROCALTROL) capsule 0.5 mcg  0.5 mcg Oral DAILY    calcium acetate(phosphat bind) (PHOSLO) capsule 1,334 mg  2 Capsule Oral TID WITH MEALS    pantoprazole (PROTONIX) tablet 40 mg  40 mg Oral ACB    sodium chloride (NS) flush 5-40 mL  5-40 mL IntraVENous Q8H    sodium chloride (NS) flush 5-40 mL  5-40 mL IntraVENous PRN    polyethylene glycol (MIRALAX) packet 17 g  17 g Oral DAILY PRN    ondansetron (ZOFRAN ODT) tablet 4 mg  4 mg Oral Q8H PRN    Or    ondansetron (ZOFRAN) injection 4 mg  4 mg IntraVENous Q6H PRN    oxyCODONE IR (ROXICODONE) tablet 5 mg  5 mg Oral Q4H PRN    ascorbic acid (vitamin C) (VITAMIN C) tablet 500 mg  500 mg Oral DAILY    zinc sulfate (ZINCATE) 50 mg zinc (220 mg) capsule 1 Capsule  1 Capsule Oral DAILY    guaiFENesin ER (MUCINEX) tablet 600 mg  600 mg Oral Q12H    albuterol (PROVENTIL HFA, VENTOLIN HFA, PROAIR HFA) inhaler 2 Puff  2 Puff Inhalation Q6H PRN    dexAMETHasone (DECADRON) tablet 6 mg  6 mg Oral DAILY    Warfarin - Pharmacy to Dose   Other Rx Dosing/Monitoring    Vancomycin - Pharmacy to dose   Other Rx Dosing/Monitoring         REVIEW OF SYSTEMS   Negative except as stated in the HPI. Physical Exam:   Visit Vitals  /68   Pulse 64   Temp 97.5 °F (36.4 °C)   Resp 18   Ht 5' 7\" (1.702 m)   Wt 64.7 kg (142 lb 10.2 oz)   SpO2 95%   BMI 22.34 kg/m²       General:  Alert, cooperative, no distress, appears stated age. Head:  Normocephalic, without obvious abnormality, atraumatic. Eyes:  Conjunctivae/corneas clear. Nose: Nares normal. Septum midline. Mucosa normal.    Throat: Lips, mucosa, and tongue normal. Teeth and gums normal.   Neck: Supple, symmetrical, trachea midline. Lungs:   Clear to auscultation bilaterally. Chest wall:  No tenderness or deformity. Heart:  Regular rate and rhythm, S1, S2 normal, no murmur, click, rub or gallop. Abdomen:   Soft, non-tender. Bowel sounds normal. No masses,  No organomegaly. Extremities: Extremities normal, atraumatic, no cyanosis or edema. Pulses: 2+ and symmetric all extremities. Skin: Skin color, texture, turgor normal. No rashes or lesions   Lymph nodes: Cervical, supraclavicular, and axillary nodes normal.   Neurologic: Grossly nonfocal       Lab Results   Component Value Date/Time    Sodium 133 (L) 12/01/2021 04:40 AM    Potassium 5.3 (H) 12/01/2021 04:40 AM    Chloride 96 (L) 12/01/2021 04:40 AM    CO2 25 12/01/2021 04:40 AM    BUN 67 (H) 12/01/2021 04:40 AM    Creatinine 7.29 (H) 12/01/2021 04:40 AM    Glucose 130 (H) 12/01/2021 04:40 AM    Calcium 8.4 (L) 12/01/2021 04:40 AM    Magnesium 2.8 (H) 12/01/2021 04:40 AM    Phosphorus 5.5 (H) 12/01/2021 04:40 AM    Lactic acid 1.6 12/02/2021 07:30 AM       Lab Results   Component Value Date/Time    WBC 10.3 12/01/2021 04:40 AM    HGB 9.7 (L) 12/01/2021 04:40 AM    PLATELET 88 (L) 14/63/8093 04:40 AM    MCV 97.7 12/01/2021 04:40 AM       Lab Results   Component Value Date/Time    INR 3.8 (H) 12/02/2021 07:30 AM    aPTT 47.0 (H) 11/05/2021 04:34 PM    Alk.  phosphatase 250 (H) 12/01/2021 04:40 AM    Protein, total 7.9 12/01/2021 04:40 AM    Albumin 3.1 (L) 12/01/2021 04:40 AM    Globulin 4.8 (H) 12/01/2021 04:40 AM       Lab Results   Component Value Date/Time    Iron 57 11/22/2018 05:27 AM    TIBC 234 (L) 11/22/2018 05:27 AM    Iron % saturation 24 11/22/2018 05:27 AM    Ferritin 9,604 (H) 11/27/2021 03:14 PM Lab Results   Component Value Date/Time    C-Reactive protein 3.20 (H) 12/02/2021 07:30 AM    TSH 1.28 09/26/2014 08:32 PM        No results found for: PH, PHI, PCO2, PCO2I, PO2, PO2I, HCO3, HCO3I, FIO2, FIO2I    Lab Results   Component Value Date/Time     01/16/2019 07:09 AM    CK-MB Index 2.0 01/16/2019 07:09 AM    Troponin-I, Qt. <0.05 03/29/2021 05:06 AM    BNP 1,946 (H) 09/16/2014 04:17 AM        Lab Results   Component Value Date/Time    Culture result: NO GROWTH 5 DAYS 11/27/2021 02:05 PM    Culture result: NO GROWTH 5 DAYS 11/27/2021 01:40 PM    Culture result: NO GROWTH 5 DAYS 11/07/2021 09:50 AM       Lab Results   Component Value Date/Time    Hepatitis B surface Ag 0.50 12/01/2021 04:40 AM       Lab Results   Component Value Date/Time    Vancomycin,trough 17.7 (H) 12/08/2011 02:59 AM    Vancomycin,trough 19.5 (H) 12/02/2011 02:40 AM     01/16/2019 07:09 AM    CK 62 05/20/2016 06:34 AM       Lab Results   Component Value Date/Time    Color RED 11/16/2012 05:15 PM    Appearance OPAQUE 11/16/2012 05:15 PM    Specific gravity 1.020 11/16/2012 05:15 PM    pH (UA) 8.0 11/16/2012 05:15 PM    Protein >300 (A) 11/16/2012 05:15 PM    Glucose 100 (A) 11/16/2012 05:15 PM    Ketone NEGATIVE  11/16/2012 05:15 PM    Bilirubin NEGATIVE  09/17/2012 01:35 AM    Blood MODERATE (A) 11/16/2012 05:15 PM    Urobilinogen 0.2 11/16/2012 05:15 PM    Nitrites NEGATIVE  11/16/2012 05:15 PM    Leukocyte Esterase NEGATIVE  11/16/2012 05:15 PM    WBC >100 (H) 11/16/2012 05:15 PM    RBC >100 (H) 11/16/2012 05:15 PM    Bacteria 3+ (A) 11/16/2012 05:15 PM     ·     Thanks for the consult.       Rosmery Lopez MD

## 2021-12-02 NOTE — PROGRESS NOTES
Progress Note      12/2/2021 6:02 AM  NAME: Ermelinda Landrum   MRN:  [id number]   Admit Diagnosis: Acute COVID-19 [U07.1]     Problem List:     Problem list:   - COVID PNA   - AWMI with PCI of LAD in 2008  - ICM EF 25%, EVER 3/2021 no signs of endocarditis, TTE 11/2021 EF 35%, sev pHTN  - Hx of ICD in 2009, subsequent lead fracture and device was turned off, s/p [employer] Sub Q ICD, in 2021 Intravenous ICD was extracted due to recurrent bacteremia, EVER with no overt infection  - Hx of IVC filter due to DVT  - ESRD on HD, failed renal transplant x 2, home HD Dr. Daren Mccauley  - Anemia  - Thrombocytopnia, hx of HIT  - Recurrent bacteremia, follows with ID  - Hx of non-compliance. - Abdominal pain              Assessment/Plan:     COVID PNA  Increased LFT  Atypical chest pain, no ST changes, down trending equivicol HS trop, manage medically    - Cont warfarin  - Cont ASA  - cont metoprolol succ 12.5mg  - Resume atorvastatin once LFT improve    Please call with cardiology concerns          [x]       High complexity decision making was performed in this patient at high risk for decompensation with multiple organ involvement. We discussed the expected course, resolution and complications of the diagnoses in detail. Medication risk, benefits, costs, interactions, and alternatives were discussed as indicated. I advised him to contact the office if his condition worsens, changes or fails to improve as anticipated. Patient expressed understanding with the diagnoses  and plan. Subjective:     Ermelinda Landrum still with some chest, abdominal, back discomfort, similar to yesterday, to get Hd today  Discussed with RN events overnight.      Review of Systems:    Symptom Y/N Comments  Symptom Y/N Comments   Fever/Chills N   Chest Pain     Poor Appetite N   Edema N    Cough N   Abdominal Pain N    Sputum N   Joint Pain N    SOB/ZAMUDIO Y   Pruritis/Rash N    Nausea/vomit N   Tolerating PT/OT     Diarrhea N   Tolerating Diet Y Constipation N   Other       Could NOT obtain due to:      Objective:      Physical Exam:    Last 24hrs VS reviewed since prior progress note. Most recent are:    Visit Vitals  /68   Pulse 71   Temp 97.8 °F (36.6 °C)   Resp 18   Ht 5' 7\" (1.702 m)   Wt 64.7 kg (142 lb 10.2 oz)   SpO2 95%   BMI 22.34 kg/m²       Intake/Output Summary (Last 24 hours) at 12/2/2021 0858  Last data filed at 12/2/2021 0400  Gross per 24 hour   Intake 720 ml   Output 0 ml   Net 720 ml        General Appearance: Well developed, chronically ill, alert & oriented x 3,    no acute distress. Ears/Nose/Mouth/Throat: Hearing grossly normal.  Neck: Supple. Chest: Lungs ronchi to auscultation bilaterally. Cardiovascular: Regular rate and rhythm, S1S2 normal, no murmur. Abdomen: Soft, non-tender, bowel sounds are active. Extremities: No edema bilaterally. Skin: Warm and dry. PMH/ reviewed - no change compared to H&P    Data Review    Telemetry: normal sinus rhythm     Lab Data Personally Reviewed:    Recent Labs     12/01/21  0440 11/30/21  0031   WBC 10.3 12.3*   HGB 9.7* 8.8*   HCT 30.0* 27.2*   PLT 88* 84*     Recent Labs     12/02/21  0730 12/01/21 0440 11/30/21  0031   INR 3.8* 3.0* 2.3*   PTP 37.2* 29.8* 22.8*      Recent Labs     12/01/21  0440 11/30/21  0031   * 135*   K 5.3* 4.9   CL 96* 98   CO2 25 23   BUN 67* 46*   CREA 7.29* 5.71*   * 160*   CA 8.4* 8.0*   MG 2.8* 2.4     No results for input(s): CPK, CKNDX, TROIQ in the last 72 hours.     No lab exists for component: CPKMB  Lab Results   Component Value Date/Time    Cholesterol, total 139 05/22/2012 05:00 PM    HDL Cholesterol 32 05/22/2012 05:00 PM    LDL, calculated 75.8 05/22/2012 05:00 PM    Triglyceride 156 (H) 05/22/2012 05:00 PM    CHOL/HDL Ratio 4.3 05/22/2012 05:00 PM       Recent Labs     12/01/21  0440 11/30/21  0031   * 233*   TP 7.9 7.5   ALB 3.1* 2.9*   GLOB 4.8* 4.6*     No results for input(s): PH, PCO2, PO2 in the last 72 hours.    Medications Personally Reviewed:    Current Facility-Administered Medications   Medication Dose Route Frequency    metoprolol succinate (TOPROL-XL) XL tablet 12.5 mg  12.5 mg Oral DAILY    epoetin emilie-epbx (RETACRIT) 12,000 Units combo injection  12,000 Units SubCUTAneous Q MON, WED & FRI    HYDROmorphone (DILAUDID) injection 1 mg  1 mg IntraVENous Q4H PRN    calcium carbonate (TUMS) chewable tablet 200 mg [elemental]  200 mg Oral TID WITH MEALS    aluminum-magnesium hydroxide (MAALOX) oral suspension 15 mL  15 mL Oral QID PRN    famotidine (PEPCID) tablet 20 mg  20 mg Oral DAILY    nitroglycerin (NITROSTAT) tablet 0.4 mg  0.4 mg SubLINGual PRN    sodium chloride (NS) flush 5-10 mL  5-10 mL IntraVENous PRN    acetaminophen (TYLENOL) tablet 650 mg  650 mg Oral Q6H PRN    L.acidophilus-paracasei-S.thermophil-bifidobacter (RISAQUAD) 8 billion cell capsule  1 Capsule Oral DAILY    aspirin chewable tablet 81 mg  81 mg Oral DAILY    calcitRIOL (ROCALTROL) capsule 0.5 mcg  0.5 mcg Oral DAILY    calcium acetate(phosphat bind) (PHOSLO) capsule 1,334 mg  2 Capsule Oral TID WITH MEALS    pantoprazole (PROTONIX) tablet 40 mg  40 mg Oral ACB    sodium chloride (NS) flush 5-40 mL  5-40 mL IntraVENous Q8H    sodium chloride (NS) flush 5-40 mL  5-40 mL IntraVENous PRN    polyethylene glycol (MIRALAX) packet 17 g  17 g Oral DAILY PRN    ondansetron (ZOFRAN ODT) tablet 4 mg  4 mg Oral Q8H PRN    Or    ondansetron (ZOFRAN) injection 4 mg  4 mg IntraVENous Q6H PRN    oxyCODONE IR (ROXICODONE) tablet 5 mg  5 mg Oral Q4H PRN    ascorbic acid (vitamin C) (VITAMIN C) tablet 500 mg  500 mg Oral DAILY    zinc sulfate (ZINCATE) 50 mg zinc (220 mg) capsule 1 Capsule  1 Capsule Oral DAILY    guaiFENesin ER (MUCINEX) tablet 600 mg  600 mg Oral Q12H    albuterol (PROVENTIL HFA, VENTOLIN HFA, PROAIR HFA) inhaler 2 Puff  2 Puff Inhalation Q6H PRN    dexAMETHasone (DECADRON) tablet 6 mg  6 mg Oral DAILY    Warfarin - Pharmacy to Dose   Other Rx Dosing/Monitoring    Vancomycin - Pharmacy to dose   Other Rx Dosing/Monitoring         Charisse Diego MD

## 2021-12-02 NOTE — PROGRESS NOTES
0700: Bedside shift report received from [place]. Report consisted of SBAR, MAR, Intake/Output, Recent Results, and cardiac rhythm. Dialysis nurses in room with patient. Dialysis will get AM labs. 1300: Unsuccessful attempts at [facility]; will escalate up the chain of command for lab draws. 1340: Called cath lab for new IV and lab draws. 1415: PICC team called; will get CMP and start new PIV. End of Shift Note    Bedside shift change report given to MANUELITO Good (oncoming nurse) by Javier Barnes RN (offgoing nurse). Report included the following information SBAR, Kardex, Intake/Output, MAR, Recent Results and Cardiac Rhythm sinus rhythm    Shift worked:  [id number]   Shift summary and any significant changes:     Patient continues to have pain    Patient had HD today; plan for HD tomorrow for patient normal dialysis schedule. 2L removed during dialysis today. PT/OT saw patient today. Concerns for physician to address:       Zone phone for oncoming shift:          Activity:  Activity Level: Up with Assistance  Number times ambulated in hallways past shift: 0  Number of times OOB to chair past shift: 1    Cardiac:   Cardiac Monitoring: Yes      Cardiac Rhythm: Sinus Rhythm    Access:   Current line(s): PIV     Genitourinary:   Urinary status: anuric    Respiratory:   O2 Device: None (Room air)  Chronic home O2 use?: NO  Incentive spirometer at bedside: NO     GI:  Last Bowel Movement Date: 12/01/21  Current diet:  ADULT DIET Regular; Low Fat/Low Chol/High Fiber/2 gm Na; Low Potassium (Less than 3000 mg/day); Low Phosphorus (Less than 1000 mg)  DIET ONE TIME MESSAGE  Passing flatus: YES  Tolerating current diet: YES       Pain Management:   Patient states pain is manageable on current regimen: YES    Skin:  Yong Score: 20  Interventions: increase time out of bed, internal/external urinary devices and nutritional support     Patient Safety:  Fall Score:  Total Score: 4  Interventions: bed/chair alarm, assistive device (walker, cane, etc), gripper socks, pt to call before getting OOB and stay with me (per policy)  High Fall Risk: Yes    Length of Stay:  Expected LOS: 5d 9h  Actual LOS: 5      Toni Huffman, RN

## 2021-12-02 NOTE — PROGRESS NOTES
Problem: Airway Clearance - Ineffective  Goal: Achieve or maintain patent airway  Outcome: Progressing Towards Goal     Problem: Gas Exchange - Impaired  Goal: Absence of hypoxia  Outcome: Progressing Towards Goal  Goal: Promote optimal lung function  Outcome: Progressing Towards Goal     Problem: Breathing Pattern - Ineffective  Goal: Ability to achieve and maintain a regular respiratory rate  Outcome: Progressing Towards Goal     Problem: Body Temperature -  Risk of, Imbalanced  Goal: Ability to maintain a body temperature within defined limits  Outcome: Progressing Towards Goal  Goal: Will regain or maintain usual level of consciousness  Outcome: Progressing Towards Goal  Goal: Complications related to the disease process, condition or treatment will be avoided or minimized  Outcome: Progressing Towards Goal     Problem: Isolation Precautions - Risk of Spread of Infection  Goal: Prevent transmission of infectious organism to others  Outcome: Progressing Towards Goal     Problem: Nutrition Deficits  Goal: Optimize nutrtional status  Outcome: Progressing Towards Goal     Problem: Risk for Fluid Volume Deficit  Goal: Maintain normal heart rhythm  Outcome: Progressing Towards Goal  Goal: Maintain absence of muscle cramping  Outcome: Progressing Towards Goal  Goal: Maintain normal serum potassium, sodium, calcium, phosphorus, and pH  Outcome: Progressing Towards Goal     Problem: Loneliness or Risk for Loneliness  Goal: Demonstrate positive use of time alone when socialization is not possible  Outcome: Progressing Towards Goal     Problem: Fatigue  Goal: Verbalize increase energy and improved vitality  Outcome: Progressing Towards Goal     Problem: Patient Education: Go to Patient Education Activity  Goal: Patient/Family Education  Outcome: Progressing Towards Goal     Problem: Falls - Risk of  Goal: *Absence of Falls  Description: Document [place] Fall Risk and appropriate interventions in the flowsheet.   Outcome: Progressing Towards Goal  Note: Fall Risk Interventions:  Mobility Interventions: Bed/chair exit alarm, Communicate number of staff needed for ambulation/transfer, Patient to call before getting OOB    Mentation Interventions: Adequate sleep, hydration, pain control, Bed/chair exit alarm, Door open when patient unattended, Increase mobility, More frequent rounding, Reorient patient, Self-releasing belt, Toileting rounds, Update white board    Medication Interventions: Bed/chair exit alarm, Patient to call before getting OOB, Teach patient to arise slowly    Elimination Interventions: Bed/chair exit alarm, Call light in reach, Patient to call for help with toileting needs, Stay With Me (per policy), Toilet paper/wipes in reach, Toileting schedule/hourly rounds, Urinal in reach    History of Falls Interventions: Bed/chair exit alarm, Door open when patient unattended, Investigate reason for fall, Room close to nurse's station         Problem: Patient Education: Go to Patient Education Activity  Goal: Patient/Family Education  Outcome: Progressing Towards Goal     Problem: Patient Education: Go to Patient Education Activity  Goal: Patient/Family Education  Outcome: Progressing Towards Goal     Problem: Patient Education: Go to Patient Education Activity  Goal: Patient/Family Education  Outcome: Progressing Towards Goal     Problem: Patient Education: Go to Patient Education Activity  Goal: Patient/Family Education  Outcome: Progressing Towards Goal     Problem: Sepsis: Day of Diagnosis  Goal: Off Pathway (Use only if patient is Off Pathway)  Outcome: Progressing Towards Goal  Goal: *Fluid resuscitation  Outcome: Progressing Towards Goal  Goal: *Paired blood cultures prior to first dose of antibiotic  Outcome: Progressing Towards Goal  Goal: *First dose of  appropriate antibiotic within 3 hours of arrival to ED, within 1 hour of arrival to ICU  Outcome: Progressing Towards Goal  Goal: *Lactic acid with first set of blood cultures  Outcome: Progressing Towards Goal  Goal: *Pneumococcal immunization (if eligible)  Outcome: Progressing Towards Goal  Goal: *Influenza immunization (if eligible)  Outcome: Progressing Towards Goal  Goal: Activity/Safety  Outcome: Progressing Towards Goal  Goal: Consults, if ordered  Outcome: Progressing Towards Goal  Goal: Diagnostic Test/Procedures  Outcome: Progressing Towards Goal  Goal: Nutrition/Diet  Outcome: Progressing Towards Goal  Goal: Discharge Planning  Outcome: Progressing Towards Goal  Goal: Medications  Outcome: Progressing Towards Goal  Goal: Respiratory  Outcome: Progressing Towards Goal  Goal: Treatments/Interventions/Procedures  Outcome: Progressing Towards Goal  Goal: Psychosocial  Outcome: Progressing Towards Goal     Problem: Sepsis: Day 2  Goal: Off Pathway (Use only if patient is Off Pathway)  Outcome: Progressing Towards Goal  Goal: *Central Venous Pressure maintained at 8-12 mm Hg  Outcome: Progressing Towards Goal  Goal: *Hemodynamically stable  Outcome: Progressing Towards Goal  Goal: *Tolerating diet  Outcome: Progressing Towards Goal  Goal: Activity/Safety  Outcome: Progressing Towards Goal  Goal: Consults, if ordered  Outcome: Progressing Towards Goal  Goal: Diagnostic Test/Procedures  Outcome: Progressing Towards Goal  Goal: Nutrition/Diet  Outcome: Progressing Towards Goal  Goal: Discharge Planning  Outcome: Progressing Towards Goal  Goal: Medications  Outcome: Progressing Towards Goal  Goal: Respiratory  Outcome: Progressing Towards Goal  Goal: Treatments/Interventions/Procedures  Outcome: Progressing Towards Goal  Goal: Psychosocial  Outcome: Progressing Towards Goal     Problem: Sepsis: Day 3  Goal: Off Pathway (Use only if patient is Off Pathway)  Outcome: Progressing Towards Goal  Goal: *Central Venous Pressure maintained at 8-12 mm Hg  Outcome: Progressing Towards Goal  Goal: *Oxygen saturation within defined limits  Outcome: Progressing Towards Goal  Goal: *Vital sign stability  Outcome: Progressing Towards Goal  Goal: *Tolerating diet  Outcome: Progressing Towards Goal  Goal: *Demonstrates progressive activity  Outcome: Progressing Towards Goal  Goal: Activity/Safety  Outcome: Progressing Towards Goal  Goal: Consults, if ordered  Outcome: Progressing Towards Goal  Goal: Diagnostic Test/Procedures  Outcome: Progressing Towards Goal  Goal: Nutrition/Diet  Outcome: Progressing Towards Goal  Goal: Discharge Planning  Outcome: Progressing Towards Goal  Goal: Medications  Outcome: Progressing Towards Goal  Goal: Respiratory  Outcome: Progressing Towards Goal  Goal: Treatments/Interventions/Procedures  Outcome: Progressing Towards Goal  Goal: Psychosocial  Outcome: Progressing Towards Goal     Problem: Sepsis: Day 4  Goal: Off Pathway (Use only if patient is Off Pathway)  Outcome: Progressing Towards Goal  Goal: Activity/Safety  Outcome: Progressing Towards Goal  Goal: Consults, if ordered  Outcome: Progressing Towards Goal  Goal: Diagnostic Test/Procedures  Outcome: Progressing Towards Goal  Goal: Nutrition/Diet  Outcome: Progressing Towards Goal  Goal: Discharge Planning  Outcome: Progressing Towards Goal  Goal: Medications  Outcome: Progressing Towards Goal  Goal: Respiratory  Outcome: Progressing Towards Goal  Goal: Treatments/Interventions/Procedures  Outcome: Progressing Towards Goal  Goal: Psychosocial  Outcome: Progressing Towards Goal  Goal: *Oxygen saturation within defined limits  Outcome: Progressing Towards Goal  Goal: *Hemodynamically stable  Outcome: Progressing Towards Goal  Goal: *Vital signs within defined limits  Outcome: Progressing Towards Goal  Goal: *Tolerating diet  Outcome: Progressing Towards Goal  Goal: *Demonstrates progressive activity  Outcome: Progressing Towards Goal  Goal: *Fluid volume maintenance  Outcome: Progressing Towards Goal     Problem: Sepsis: Day 5  Goal: Off Pathway (Use only if patient is Off Pathway)  Outcome: Progressing Towards Goal  Goal: *Oxygen saturation within defined limits  Outcome: Progressing Towards Goal  Goal: *Vital signs within defined limits  Outcome: Progressing Towards Goal  Goal: *Tolerating diet  Outcome: Progressing Towards Goal  Goal: *Demonstrates progressive activity  Outcome: Progressing Towards Goal  Goal: *Discharge plan identified  Outcome: Progressing Towards Goal  Goal: Activity/Safety  Outcome: Progressing Towards Goal  Goal: Consults, if ordered  Outcome: Progressing Towards Goal  Goal: Diagnostic Test/Procedures  Outcome: Progressing Towards Goal  Goal: Nutrition/Diet  Outcome: Progressing Towards Goal  Goal: Discharge Planning  Outcome: Progressing Towards Goal  Goal: Medications  Outcome: Progressing Towards Goal  Goal: Respiratory  Outcome: Progressing Towards Goal  Goal: Treatments/Interventions/Procedures  Outcome: Progressing Towards Goal  Goal: Psychosocial  Outcome: Progressing Towards Goal     Problem: Sepsis: Day 6  Goal: Off Pathway (Use only if patient is Off Pathway)  Outcome: Progressing Towards Goal  Goal: *Oxygen saturation within defined limits  Outcome: Progressing Towards Goal  Goal: *Vital signs within defined limits  Outcome: Progressing Towards Goal  Goal: *Tolerating diet  Outcome: Progressing Towards Goal  Goal: *Demonstrates progressive activity  Outcome: Progressing Towards Goal  Goal: Influenza immunization  Outcome: Progressing Towards Goal  Goal: *Pneumococcal immunization  Outcome: Progressing Towards Goal  Goal: Activity/Safety  Outcome: Progressing Towards Goal  Goal: Diagnostic Test/Procedures  Outcome: Progressing Towards Goal  Goal: Nutrition/Diet  Outcome: Progressing Towards Goal  Goal: Discharge Planning  Outcome: Progressing Towards Goal  Goal: Medications  Outcome: Progressing Towards Goal  Goal: Respiratory  Outcome: Progressing Towards Goal  Goal: Treatments/Interventions/Procedures  Outcome: Progressing Towards Goal  Goal: Psychosocial  Outcome: Progressing Towards Goal     Problem: Sepsis: Discharge Outcomes  Goal: *Vital signs within defined limits  Outcome: Progressing Towards Goal  Goal: *Tolerating diet  Outcome: Progressing Towards Goal  Goal: *Verbalizes understanding and describes prescribed diet  Outcome: Progressing Towards Goal  Goal: *Demonstrates progressive activity  Outcome: Progressing Towards Goal  Goal: *Describes follow-up/return visits to physicians  Outcome: Progressing Towards Goal  Goal: *Verbalizes name, dosage, time, side effects, and number of days to continue medications  Outcome: Progressing Towards Goal  Goal: *Influenza immunization (Oct-Mar only)  Outcome: Progressing Towards Goal  Goal: *Pneumococcal immunization  Outcome: Progressing Towards Goal  Goal: *Lungs clear or at baseline  Outcome: Progressing Towards Goal  Goal: *Oxygen saturation returns to baseline or 90% or better on room air  Outcome: Progressing Towards Goal  Goal: *Glycemic control  Outcome: Progressing Towards Goal  Goal: *Absence of deep venous thrombosis signs and symptoms(Stroke Metric)  Outcome: Progressing Towards Goal  Goal: *Describes available resources and support systems  Outcome: Progressing Towards Goal  Goal: *Optimal pain control at patient's stated goal  Outcome: Progressing Towards Goal

## 2021-12-02 NOTE — PROGRESS NOTES
Problem: Airway Clearance - Ineffective  Goal: Achieve or maintain patent airway  Outcome: Progressing Towards Goal     Problem: Gas Exchange - Impaired  Goal: Absence of hypoxia  Outcome: Progressing Towards Goal  Goal: Promote optimal lung function  Outcome: Progressing Towards Goal     Problem: Isolation Precautions - Risk of Spread of Infection  Goal: Prevent transmission of infectious organism to others  Outcome: Progressing Towards Goal     Problem: Risk for Fluid Volume Deficit  Goal: Maintain normal heart rhythm  Outcome: Progressing Towards Goal     Problem: Loneliness or Risk for Loneliness  Goal: Demonstrate positive use of time alone when socialization is not possible  Outcome: Progressing Towards Goal     Problem: Falls - Risk of  Goal: *Absence of Falls  Description: Document [place] Fall Risk and appropriate interventions in the flowsheet.   Outcome: Progressing Towards Goal  Note: Fall Risk Interventions:  Mobility Interventions: Bed/chair exit alarm, Communicate number of staff needed for ambulation/transfer, Patient to call before getting OOB    Mentation Interventions: Adequate sleep, hydration, pain control, Bed/chair exit alarm, Door open when patient unattended, Increase mobility, More frequent rounding, Reorient patient, Room close to nurse's station, Toileting rounds, Update white board    Medication Interventions: Bed/chair exit alarm, Patient to call before getting OOB, Teach patient to arise slowly    Elimination Interventions: Bed/chair exit alarm, Call light in reach, Patient to call for help with toileting needs, Stay With Me (per policy), Toilet paper/wipes in reach, Toileting schedule/hourly rounds    History of Falls Interventions: Bed/chair exit alarm, Door open when patient unattended, Investigate reason for fall, Room close to nurse's station

## 2021-12-02 NOTE — PROGRESS NOTES
Nephrology Progress Note  Blaise Leblanc     www. [facility]  Phone - [phone]   Patient: Christina Howell    YOB: 1975        Date- 12/2/2021   Admit Date: 11/27/2021  CC: Follow up for  ESRD          IMPRESSION & PLAN:   · ESRD- home HD 5 days per week- Follows up with Dr Pepe Kerr at [facility] ( now [facility] for IV antibiotics.)  · Covid 19 infection  · hyperkalemia  · Left arm swelling- s/p angioplasty of left subclavian vein  · Gram positive sepsis from dental abcess  · s/p lead extraction at [facility]  · Anemia of ckd  · Hx of renal tx in past times 2.  · Hypertension  · CAD  · Failed RTX times 2         H/o DVT, s/p ivc filter/ h/o HIT      PLAN-   Plan for hemodialysis today.  Next HD will be Tomorrow.  k should improve with dialysis today   Continue phoslo   EPO for anemia     Subjective: Interval History:   Seen and examined today on Hd  Complains of abdominal/chest discomfort. No sob  No fever      Objective:   Vitals:    12/02/21 1000 12/02/21 1015 12/02/21 1030 12/02/21 1048   BP: 117/70 115/68 119/68 113/66   Pulse: 65 66 61 65   Resp:       Temp:       TempSrc:       SpO2:       Weight:       Height:          12/01 0701 - 12/02 0700  In: 720 [P.O.:720]  Out: 0   Last 3 Recorded Weights in this Encounter    11/30/21 0354 12/01/21 0501 12/02/21 0636   Weight: 64.6 kg (142 lb 6.4 oz) 63.9 kg (140 lb 12.8 oz) 64.7 kg (142 lb 10.2 oz)      Physical exam:   GEN: NAD  NECK- Supple  RESP: no distress  NEURO:non focal      Chart reviewed. Pertinent Notes reviewed.      Data Review :  Recent Labs     12/01/21  0440 11/30/21  0031   * 135*   K 5.3* 4.9   CL 96* 98   CO2 25 23   BUN 67* 46*   CREA 7.29* 5.71*   * 160*   CA 8.4* 8.0*   MG 2.8* 2.4   PHOS 5.5* 5.8*     Recent Labs     12/01/21  0440 11/30/21  0031   WBC 10.3 12.3*   HGB 9.7* 8.8*   HCT 30.0* 27.2*   PLT 88* 84*     No results for input(s): FE, TIBC, PSAT, FERR in the last 72 hours.    Medication list  reviewed  Current Facility-Administered Medications   Medication Dose Route Frequency    Warfarin- Hold dose tonight 12/2   Other ONCE    metoprolol succinate (TOPROL-XL) XL tablet 12.5 mg  12.5 mg Oral DAILY    epoetin emilie-epbx (RETACRIT) 12,000 Units combo injection  12,000 Units SubCUTAneous Q MON, WED & FRI    HYDROmorphone (DILAUDID) injection 1 mg  1 mg IntraVENous Q4H PRN    calcium carbonate (TUMS) chewable tablet 200 mg [elemental]  200 mg Oral TID WITH MEALS    aluminum-magnesium hydroxide (MAALOX) oral suspension 15 mL  15 mL Oral QID PRN    famotidine (PEPCID) tablet 20 mg  20 mg Oral DAILY    nitroglycerin (NITROSTAT) tablet 0.4 mg  0.4 mg SubLINGual PRN    sodium chloride (NS) flush 5-10 mL  5-10 mL IntraVENous PRN    acetaminophen (TYLENOL) tablet 650 mg  650 mg Oral Q6H PRN    L.acidophilus-paracasei-S.thermophil-bifidobacter (RISAQUAD) 8 billion cell capsule  1 Capsule Oral DAILY    aspirin chewable tablet 81 mg  81 mg Oral DAILY    calcitRIOL (ROCALTROL) capsule 0.5 mcg  0.5 mcg Oral DAILY    calcium acetate(phosphat bind) (PHOSLO) capsule 1,334 mg  2 Capsule Oral TID WITH MEALS    pantoprazole (PROTONIX) tablet 40 mg  40 mg Oral ACB    sodium chloride (NS) flush 5-40 mL  5-40 mL IntraVENous Q8H    sodium chloride (NS) flush 5-40 mL  5-40 mL IntraVENous PRN    polyethylene glycol (MIRALAX) packet 17 g  17 g Oral DAILY PRN    ondansetron (ZOFRAN ODT) tablet 4 mg  4 mg Oral Q8H PRN    Or    ondansetron (ZOFRAN) injection 4 mg  4 mg IntraVENous Q6H PRN    oxyCODONE IR (ROXICODONE) tablet 5 mg  5 mg Oral Q4H PRN    ascorbic acid (vitamin C) (VITAMIN C) tablet 500 mg  500 mg Oral DAILY    zinc sulfate (ZINCATE) 50 mg zinc (220 mg) capsule 1 Capsule  1 Capsule Oral DAILY    guaiFENesin ER (MUCINEX) tablet 600 mg  600 mg Oral Q12H    albuterol (PROVENTIL HFA, VENTOLIN HFA, PROAIR HFA) inhaler 2 Puff  2 Puff Inhalation Q6H PRN    dexAMETHasone (DECADRON) tablet 6 mg  6 mg Oral DAILY    Warfarin - Pharmacy to Dose   Other Rx Dosing/Monitoring    Vancomycin - Pharmacy to dose   Other Rx Dosing/Monitoring          Osvaldo French, [address] [facility]  [facility] / [facility], [address] [place], [address]  Phone - [phone]               Fax - [phone]

## 2021-12-02 NOTE — PROGRESS NOTES
Pharmacist Daily Dosing of Warfarin    Indication & Goal INR: DVT, INR Goal 2-3    PTA Warfarin Dose: 2.5 mg daily    Notable concurrent conditions and medications: Aspirin    Labs:  Recent Labs     12/02/21  0730 12/01/21  0440 11/30/21  0031 11/30/21  0031   INR 3.8* 3.0*  --  2.3*   HGB  --  9.7*   < > 8.8*   PLT  --  88*  --  84*   TBILI  --  1.1*  --  1.0   ALB  --  3.1*   < > 2.9*    < > = values in this interval not displayed. Impression/Plan:   Hold warfarin tonight for INR of 3.8  Daily INR has been ordered  CBC w/o differential every other day has been ordered     Pharmacy will follow daily and adjust the dose as appropriate.     Thank you,  Bruna Smith, PHARMD

## 2021-12-02 NOTE — PROGRESS NOTES
End of Shift Note    Bedside shift change report given to RN (oncoming nurse) by Aleksandra Birmingham RN (offgoing nurse). Report included the following information SBAR, Kardex, ED Summary, Intake/Output, MAR, Recent Results, Med Rec Status and Cardiac Rhythm Sinus Rhythm    Shift worked:  7p-7a     Shift summary and any significant changes:    Unable to stick pt after 3 attempts including charge RN, dialysis did not come, contacted them and was told they would be here at 0700. Pain medication was going back and forth between dilaudid and benigno      Concerns for physician to address:       Zone phone for oncoming shift:          Activity:  Activity Level: Up with Assistance, Up ad fouzia  Number times ambulated in hallways past shift: 0  Number of times OOB to chair past shift: 0    Cardiac:   Cardiac Monitoring: Yes      Cardiac Rhythm: Sinus Rhythm    Access:   Current line(s): PIV and HD access     Genitourinary:   Urinary status: voiding    Respiratory:   O2 Device: None (Room air)  Chronic home O2 use?: NO  Incentive spirometer at bedside: NO     GI:  Last Bowel Movement Date: 12/01/21  Current diet:  ADULT DIET Regular; Low Fat/Low Chol/High Fiber/2 gm Na; Low Potassium (Less than 3000 mg/day); Low Phosphorus (Less than 1000 mg)  Passing flatus: YES  Tolerating current diet: YES       Pain Management:   Patient states pain is manageable on current regimen: YES    Skin:  Yong Score: 20  Interventions: float heels, increase time out of bed and limit briefs    Patient Safety:  Fall Score:  Total Score: 4  Interventions: bed/chair alarm, gripper socks, pt to call before getting OOB and stay with me (per policy)  High Fall Risk: Yes    Length of Stay:  Expected LOS: 5d 9h  Actual LOS: Via Ree Lundberg RN

## 2021-12-02 NOTE — PROGRESS NOTES
Hemodialysis / [phone]    Vitals Pre Post Assessment Pre Post   BP BP: 112/74 (12/02/21 0743) 113/68 LOC A & O x4 A & O x 4   HR Pulse (Heart Rate): 70 (12/02/21 0743) 64 Lungs Clear bilat Clear bilat   Resp Resp Rate: 18 (12/02/21 0743) 18 Cardiac WNL WNL   Temp Temp: 97.8 °F (36.6 °C) (12/02/21 0743) 97.8 Skin Dry and intact Dry intact   Weight Pre-Dialysis Weight: 66.7 kg (147 lb) (12/02/21 0743) 64.7 kg Edema +2 BLE +2 BLE   Tele status Remote tele Remote tele Pain Pain Intensity 1: 8 (12/02/21 0713) 8     Orders   Duration: Start: 6587 End: 5969 Total: 3.5 hours   Dialyzer: Dialyzer/Set Up Inspection: Reggie Hatchet (12/02/21 0743)   Rachel Batty Bath: Dialysate K (mEq/L): 2 (12/02/21 0743)   Ca Bath: Dialysate CA (mEq/L): 2.5 (12/02/21 0743)   Na: Dialysate NA (mEq/L): 138 (12/02/21 0743)   Bicarb: Dialysate HCO3 (mEq/L): 35 (12/02/21 0743)   Target Fluid Removal: Goal/Amount of Fluid to Remove (mL): 2000 mL (12/02/21 0743)     Access   Type & Location: L arm AVG   Comments:                    + bruit/thrill, no s/s of infection                    Labs   HBsAg (Antigen) / date:      negative    12/1/21                                     HBsAb (Antibody) / date: Immune 11/11/21   Source: Connect care   Obtained/Reviewed  Critical Results Called HGB   Date Value Ref Range Status   12/01/2021 9.7 (L) 12.1 - 17.0 g/dL Final     Potassium   Date Value Ref Range Status   12/01/2021 5.3 (H) 3.5 - 5.1 mmol/L Final     Calcium   Date Value Ref Range Status   12/01/2021 8.4 (L) 8.5 - 10.1 MG/DL Final     BUN   Date Value Ref Range Status   12/01/2021 67 (H) 6 - 20 MG/DL Final     Creatinine   Date Value Ref Range Status   12/01/2021 7.29 (H) 0.70 - 1.30 MG/DL Final        Meds Given   Name Dose Route                    Adequacy / Fluid    Total Liters Process: 69.7 L   Net Fluid Removed: 2000mL      Comments   Time Out Done:   (Time) 5019   Admitting Diagnosis: covid   Consent obtained/signed: Informed Consent Verified:  Yes (12/02/21 6637)   Machine / RO # Machine Number: [id number] (12/02/21 6768)   Primary Nurse Rpt Pre: Rizwana Crow RN   Primary Nurse Rpt Post: Rizwana Crow RN   Pt Education: procedure   Care Plan: To continue HD   Pts outpatient clinic:      Tx Summary   Comments:                       7:34: Patient was placed on dialysis machine, treatment started. Treatment was uneventful. 11:14 Treatment ended, needles removed, homeostasis achieved, bed in lowest position call bell within reach.

## 2021-12-02 NOTE — PROGRESS NOTES
Hospitalist Progress Note    NAME: Adolph Quevedo   :  1975   MRN:  [id number]       Assessment / Plan:  Acute Covid 19 infection syndrome POA  Hypoxemia, resolved  Recent Streptococcus intermedius bacteremia (2021)  -onset of symptoms past Wednesday 3 days PTA, not vaccinated  -initially required only 2L, now off oxygen and >97% on RA  -Rapid Covid test positive  -Blood culture NGTD  -continue to dose IV Vanco prn. Follow random levels. Per last discharge summary, plan is to continue Vanco until Dec 6. Will ask ID for input given no clear explanation for his elevated procalcitonin  -Procalcitonin 26 on admission, repeat is 7.9  -continue decadron. Follow CRP and ferritin  -cefepime stopped (received -)  -lactic acidosis has been trending down since his last admission, 2 weeks ago  -should keep appointment with Dr Jhony Dobson, oral surgery, on 21  -had HD this AM, will repeat CXR after and ask nursing to check exercise saturation    Abdominal pain  Transaminitis  Elevated lipase  Heartburn   -CT abdomen:  Nothing acute  -Abd US Uniform liver. Absent gallbladder without biliary dilation. Small quantity ascites  -acute hepatitis panel NEG  -LFTs are trending down. AST>ALT, Hg stable  -could be COVID related  -add carafate to PPI and H2b. 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 Fentanyl not helping either. After some discussion, will allow dilaudid    GERD  -PPI.   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 Estimated discharge date: Nov 30, 2021  Barriers: COVID-19, Improvement of transaminitis         Subjective:     Chief Complaint / Reason for Physician Visit  Follow up elevated LFTs, elevated lipase, COVID, possible PNA vs CHF    Review of Systems:  Symptom Y/N Comments  Symptom Y/N Comments   Fever/Chills    Chest Pain     Poor Appetite    Edema     Cough    Abdominal Pain     Sputum    Joint Pain     SOB/ZAMUDIO    Pruritis/Rash     Nausea/vomit    Tolerating PT/OT     Diarrhea    Tolerating Diet     Constipation    Other       Could NOT obtain due to:      Objective:     VITALS:   Last 24hrs VS reviewed since prior progress note.  Most recent are:  Patient Vitals for the past 24 hrs:   Temp Pulse Resp BP SpO2   12/02/21 1248  71  122/80    12/02/21 1114 97.5 °F (36.4 °C) 64 18 113/68    12/02/21 1100  67  118/66    12/02/21 1048  65  113/66    12/02/21 1030  61  119/68    12/02/21 1015  66  115/68    12/02/21 1000  65  117/70    12/02/21 0945  63  114/73    12/02/21 0930  62  122/71    12/02/21 0915  68  108/71    12/02/21 0900  70  112/68    12/02/21 0845  71  111/68    12/02/21 0830  65  111/75    12/02/21 0815  66  117/79    12/02/21 0800  69  117/77    12/02/21 0743 97.8 °F (36.6 °C) 70 18 112/74 95 %   12/02/21 0740  71  119/77    12/02/21 0400 97.8 °F (36.6 °C) 72 16 110/68 95 %   12/01/21 2252 98.2 °F (36.8 °C) 74 17 107/79 95 %   12/01/21 1952 98.7 °F (37.1 °C) 72 16 130/78 96 %       Intake/Output Summary (Last 24 hours) at 12/2/2021 1529  Last data filed at 12/2/2021 1114  Gross per 24 hour   Intake 720 ml   Output 2000 ml   Net -1280 ml        I had a face to face encounter and independently examined this patient on 12/2/2021, as outlined below:  PHYSICAL EXAM:    General:          Alert, cooperative, no distress, appears stated age. HEENT:           Atraumatic, anicteric sclerae, pink conjunctivae                          No oral ulcers, mucosa moist, throat clear, dentition fair  Neck:               Supple, symmetrical,  thyroid: non tender  Lungs:             Clear to auscultation bilaterally. No Wheezing or Rhonchi. No rales. Chest wall:      No tenderness  No Accessory muscle use. Left chest wall AICD noted  Heart:              Regular  rhythm,  No  murmur   2+ LE edema bilaterally  Abdomen:        Soft, non-tender. Not distended. Bowel sounds normal  Extremities:     No cyanosis. No clubbing, left arm AV fistula noted with good thrill                          Skin turgor normal, Capillary refill normal, Radial dial pulse 2+  Skin:                Not pale. Not Jaundiced  No rashes   Psych:             Good insight. Not depressed. Not anxious or agitated. Neurologic:      EOMs intact. No facial asymmetry. No aphasia or slurred speech. Symmetrical strength, Sensation grossly intact. Alert and oriented X 4. Reviewed most current lab test results and cultures  YES  Reviewed most current radiology test results   YES  Review and summation of old records today    NO  Reviewed patient's current orders and MAR    YES  PMH/SH reviewed - no change compared to H&P  ________________________________________________________________________  Care Plan discussed with:    Comments   Patient     Family      RN     Care Manager     Consultant                        Multidiciplinary team rounds were held today with , nursing, pharmacist and clinical coordinator. Patient's plan of care was discussed; medications were reviewed and discharge planning was addressed. ________________________________________________________________________  Total NON critical care TIME:  31   Minutes    Total CRITICAL CARE TIME Spent:   Minutes non procedure based      Comments   >50% of visit spent in counseling and coordination of care     ________________________________________________________________________  Biju Grover MD     Procedures: see electronic medical records for all procedures/Xrays and details which were not copied into this note but were reviewed prior to creation of Plan. LABS:  I reviewed today's most current labs and imaging studies.   Pertinent labs include:  Recent Labs     12/01/21  0440 11/30/21  0031   WBC 10.3 12.3*   HGB 9.7* 8.8*   HCT 30.0* 27.2*   PLT 88* 84*     Recent Labs     12/02/21  1410 12/02/21  0730 12/01/21 0440 11/30/21  0031   *  --  133* 135*   K 3.9  --  5.3* 4.9   CL 97  --  96* 98   CO2 31  --  25 23   *  --  130* 160*   BUN 39*  --  67* 46*   CREA 5.05*  --  7.29* 5.71*   CA 8.6  --  8.4* 8.0*   MG  --   --  2.8* 2.4   PHOS  --   --  5.5* 5.8*   ALB 3.2*  --  3.1* 2.9*   TBILI 2.1*  --  1.1* 1.0   *  --  590* 780*   INR  --  3.8* 3.0* 2.3*       Signed: Biju Grover MD

## 2021-12-03 LAB
ALBUMIN SERPL-MCNC: 2.7 G/DL (ref 3.5–5)
ALBUMIN/GLOB SERPL: 0.6 {RATIO} (ref 1.1–2.2)
ALP SERPL-CCNC: 226 U/L (ref 45–117)
ALT SERPL-CCNC: 294 U/L (ref 12–78)
ANION GAP SERPL CALC-SCNC: 8 MMOL/L (ref 5–15)
AST SERPL-CCNC: 166 U/L (ref 15–37)
BACTERIA SPEC CULT: NORMAL
BACTERIA SPEC CULT: NORMAL
BILIRUB SERPL-MCNC: 1 MG/DL (ref 0.2–1)
BUN SERPL-MCNC: 49 MG/DL (ref 6–20)
BUN/CREAT SERPL: 8 (ref 12–20)
CALCIUM SERPL-MCNC: 8.5 MG/DL (ref 8.5–10.1)
CHLORIDE SERPL-SCNC: 100 MMOL/L (ref 97–108)
CO2 SERPL-SCNC: 26 MMOL/L (ref 21–32)
CREAT SERPL-MCNC: 5.91 MG/DL (ref 0.7–1.3)
CRP SERPL-MCNC: 2.44 MG/DL (ref 0–0.6)
D DIMER PPP FEU-MCNC: 17.14 MG/L FEU (ref 0–0.65)
FERRITIN SERPL-MCNC: 1940 NG/ML (ref 26–388)
GLOBULIN SER CALC-MCNC: 4.5 G/DL (ref 2–4)
GLUCOSE SERPL-MCNC: 172 MG/DL (ref 65–100)
INR PPP: 2 (ref 0.9–1.1)
LIPASE SERPL-CCNC: 1039 U/L (ref 73–393)
MAGNESIUM SERPL-MCNC: 2.5 MG/DL (ref 1.6–2.4)
POTASSIUM SERPL-SCNC: 3.8 MMOL/L (ref 3.5–5.1)
PROCALCITONIN SERPL-MCNC: 4.22 NG/ML
PROT SERPL-MCNC: 7.2 G/DL (ref 6.4–8.2)
PROTHROMBIN TIME: 20.7 SEC (ref 9–11.1)
SERVICE CMNT-IMP: NORMAL
SERVICE CMNT-IMP: NORMAL
SODIUM SERPL-SCNC: 134 MMOL/L (ref 136–145)

## 2021-12-03 PROCEDURE — 65270000029 HC RM PRIVATE

## 2021-12-03 PROCEDURE — 83690 ASSAY OF LIPASE: CPT

## 2021-12-03 PROCEDURE — 74011250637 HC RX REV CODE- 250/637: Performed by: INTERNAL MEDICINE

## 2021-12-03 PROCEDURE — 74011250636 HC RX REV CODE- 250/636: Performed by: INTERNAL MEDICINE

## 2021-12-03 PROCEDURE — 84145 PROCALCITONIN (PCT): CPT

## 2021-12-03 PROCEDURE — 85379 FIBRIN DEGRADATION QUANT: CPT

## 2021-12-03 PROCEDURE — 85610 PROTHROMBIN TIME: CPT

## 2021-12-03 PROCEDURE — 83735 ASSAY OF MAGNESIUM: CPT

## 2021-12-03 PROCEDURE — 80053 COMPREHEN METABOLIC PANEL: CPT

## 2021-12-03 PROCEDURE — 90935 HEMODIALYSIS ONE EVALUATION: CPT

## 2021-12-03 PROCEDURE — 36415 COLL VENOUS BLD VENIPUNCTURE: CPT

## 2021-12-03 PROCEDURE — 86140 C-REACTIVE PROTEIN: CPT

## 2021-12-03 PROCEDURE — 82728 ASSAY OF FERRITIN: CPT

## 2021-12-03 RX ORDER — WARFARIN 2.5 MG/1
2.5 TABLET ORAL
Status: COMPLETED | OUTPATIENT
Start: 2021-12-03 | End: 2021-12-03

## 2021-12-03 RX ADMIN — HYDROMORPHONE HYDROCHLORIDE 2 MG: 1 INJECTION, SOLUTION INTRAMUSCULAR; INTRAVENOUS; SUBCUTANEOUS at 16:24

## 2021-12-03 RX ADMIN — HYDROMORPHONE HYDROCHLORIDE 2 MG: 1 INJECTION, SOLUTION INTRAMUSCULAR; INTRAVENOUS; SUBCUTANEOUS at 02:07

## 2021-12-03 RX ADMIN — CALCITRIOL CAPSULES 0.25 MCG 0.5 MCG: 0.25 CAPSULE ORAL at 09:12

## 2021-12-03 RX ADMIN — ASPIRIN 81 MG CHEWABLE TABLET 81 MG: 81 TABLET CHEWABLE at 09:11

## 2021-12-03 RX ADMIN — OXYCODONE HYDROCHLORIDE AND ACETAMINOPHEN 500 MG: 500 TABLET ORAL at 09:12

## 2021-12-03 RX ADMIN — SUCRALFATE 1 G: 1 TABLET ORAL at 09:13

## 2021-12-03 RX ADMIN — CALCIUM CARBONATE (ANTACID) CHEW TAB 500 MG 200 MG: 500 CHEW TAB at 16:24

## 2021-12-03 RX ADMIN — CALCIUM ACETATE 1334 MG: 667 CAPSULE ORAL at 11:42

## 2021-12-03 RX ADMIN — EPOETIN ALFA-EPBX 12000 UNITS: 10000 INJECTION, SOLUTION INTRAVENOUS; SUBCUTANEOUS at 22:01

## 2021-12-03 RX ADMIN — FAMOTIDINE 20 MG: 20 TABLET, FILM COATED ORAL at 09:12

## 2021-12-03 RX ADMIN — HYDROMORPHONE HYDROCHLORIDE 2 MG: 1 INJECTION, SOLUTION INTRAMUSCULAR; INTRAVENOUS; SUBCUTANEOUS at 06:45

## 2021-12-03 RX ADMIN — WARFARIN SODIUM 2.5 MG: 2.5 TABLET ORAL at 21:27

## 2021-12-03 RX ADMIN — CALCIUM CARBONATE (ANTACID) CHEW TAB 500 MG 200 MG: 500 CHEW TAB at 11:42

## 2021-12-03 RX ADMIN — GUAIFENESIN 600 MG: 600 TABLET, EXTENDED RELEASE ORAL at 21:27

## 2021-12-03 RX ADMIN — GUAIFENESIN 600 MG: 600 TABLET, EXTENDED RELEASE ORAL at 09:12

## 2021-12-03 RX ADMIN — CALCIUM ACETATE 1334 MG: 667 CAPSULE ORAL at 09:12

## 2021-12-03 RX ADMIN — PANTOPRAZOLE SODIUM 40 MG: 40 TABLET, DELAYED RELEASE ORAL at 09:12

## 2021-12-03 RX ADMIN — ZINC SULFATE 220 MG (50 MG) CAPSULE 1 CAPSULE: CAPSULE at 09:14

## 2021-12-03 RX ADMIN — CALCIUM ACETATE 1334 MG: 667 CAPSULE ORAL at 16:23

## 2021-12-03 RX ADMIN — Medication 10 ML: at 16:24

## 2021-12-03 RX ADMIN — Medication 10 ML: at 21:27

## 2021-12-03 RX ADMIN — SUCRALFATE 1 G: 1 TABLET ORAL at 11:42

## 2021-12-03 RX ADMIN — SUCRALFATE 1 G: 1 TABLET ORAL at 21:27

## 2021-12-03 RX ADMIN — Medication 10 ML: at 06:45

## 2021-12-03 RX ADMIN — SUCRALFATE 1 G: 1 TABLET ORAL at 16:24

## 2021-12-03 RX ADMIN — VANCOMYCIN HYDROCHLORIDE 1000 MG: 1 INJECTION, POWDER, LYOPHILIZED, FOR SOLUTION INTRAVENOUS at 21:34

## 2021-12-03 RX ADMIN — Medication 1 CAPSULE: at 09:13

## 2021-12-03 RX ADMIN — CALCIUM CARBONATE (ANTACID) CHEW TAB 500 MG 200 MG: 500 CHEW TAB at 09:12

## 2021-12-03 RX ADMIN — METOPROLOL SUCCINATE 12.5 MG: 25 TABLET, EXTENDED RELEASE ORAL at 09:12

## 2021-12-03 RX ADMIN — HYDROMORPHONE HYDROCHLORIDE 2 MG: 1 INJECTION, SOLUTION INTRAMUSCULAR; INTRAVENOUS; SUBCUTANEOUS at 21:27

## 2021-12-03 RX ADMIN — HYDROMORPHONE HYDROCHLORIDE 2 MG: 1 INJECTION, SOLUTION INTRAMUSCULAR; INTRAVENOUS; SUBCUTANEOUS at 11:43

## 2021-12-03 RX ADMIN — DEXAMETHASONE 6 MG: 4 TABLET ORAL at 09:13

## 2021-12-03 NOTE — PROGRESS NOTES
Problem: Airway Clearance - Ineffective  Goal: Achieve or maintain patent airway  Outcome: Progressing Towards Goal     Problem: Gas Exchange - Impaired  Goal: Absence of hypoxia  Outcome: Progressing Towards Goal  Goal: Promote optimal lung function  Outcome: Progressing Towards Goal     Problem: Breathing Pattern - Ineffective  Goal: Ability to achieve and maintain a regular respiratory rate  Outcome: Progressing Towards Goal     Problem: Body Temperature -  Risk of, Imbalanced  Goal: Ability to maintain a body temperature within defined limits  Outcome: Progressing Towards Goal  Goal: Will regain or maintain usual level of consciousness  Outcome: Progressing Towards Goal  Goal: Complications related to the disease process, condition or treatment will be avoided or minimized  Outcome: Progressing Towards Goal     Problem: Isolation Precautions - Risk of Spread of Infection  Goal: Prevent transmission of infectious organism to others  Outcome: Progressing Towards Goal     Problem: Nutrition Deficits  Goal: Optimize nutrtional status  Outcome: Progressing Towards Goal     Problem: Risk for Fluid Volume Deficit  Goal: Maintain normal heart rhythm  Outcome: Progressing Towards Goal  Goal: Maintain absence of muscle cramping  Outcome: Progressing Towards Goal  Goal: Maintain normal serum potassium, sodium, calcium, phosphorus, and pH  Outcome: Progressing Towards Goal     Problem: Loneliness or Risk for Loneliness  Goal: Demonstrate positive use of time alone when socialization is not possible  Outcome: Progressing Towards Goal     Problem: Fatigue  Goal: Verbalize increase energy and improved vitality  Outcome: Progressing Towards Goal     Problem: Falls - Risk of  Goal: *Absence of Falls  Description: Document [place] Fall Risk and appropriate interventions in the flowsheet.   Outcome: Progressing Towards Goal  Note: Fall Risk Interventions:  Mobility Interventions: Communicate number of staff needed for ambulation/transfer, Bed/chair exit alarm, Patient to call before getting OOB    Mentation Interventions: Adequate sleep, hydration, pain control, Toileting rounds, Update white board    Medication Interventions: Bed/chair exit alarm, Patient to call before getting OOB, Teach patient to arise slowly    Elimination Interventions: Bed/chair exit alarm, Patient to call for help with toileting needs, Call light in reach    History of Falls Interventions: Bed/chair exit alarm         Problem: Sepsis: Day 6  Goal: Off Pathway (Use only if patient is Off Pathway)  Outcome: Progressing Towards Goal  Goal: *Oxygen saturation within defined limits  Outcome: Progressing Towards Goal  Goal: *Vital signs within defined limits  Outcome: Progressing Towards Goal  Goal: *Tolerating diet  Outcome: Progressing Towards Goal  Goal: *Demonstrates progressive activity  Outcome: Progressing Towards Goal  Goal: Influenza immunization  Outcome: Progressing Towards Goal  Goal: *Pneumococcal immunization  Outcome: Progressing Towards Goal  Goal: Activity/Safety  Outcome: Progressing Towards Goal  Goal: Diagnostic Test/Procedures  Outcome: Progressing Towards Goal  Goal: Nutrition/Diet  Outcome: Progressing Towards Goal  Goal: Discharge Planning  Outcome: Progressing Towards Goal  Goal: Medications  Outcome: Progressing Towards Goal  Goal: Respiratory  Outcome: Progressing Towards Goal  Goal: Treatments/Interventions/Procedures  Outcome: Progressing Towards Goal  Goal: Psychosocial  Outcome: Progressing Towards Goal     Problem: Sepsis: Discharge Outcomes  Goal: *Vital signs within defined limits  Outcome: Progressing Towards Goal  Goal: *Tolerating diet  Outcome: Progressing Towards Goal  Goal: *Verbalizes understanding and describes prescribed diet  Outcome: Progressing Towards Goal  Goal: *Demonstrates progressive activity  Outcome: Progressing Towards Goal  Goal: *Describes follow-up/return visits to physicians  Outcome: Progressing Towards Goal  Goal: *Verbalizes name, dosage, time, side effects, and number of days to continue medications  Outcome: Progressing Towards Goal  Goal: *Influenza immunization (Oct-Mar only)  Outcome: Progressing Towards Goal  Goal: *Pneumococcal immunization  Outcome: Progressing Towards Goal  Goal: *Lungs clear or at baseline  Outcome: Progressing Towards Goal  Goal: *Oxygen saturation returns to baseline or 90% or better on room air  Outcome: Progressing Towards Goal  Goal: *Glycemic control  Outcome: Progressing Towards Goal  Goal: *Absence of deep venous thrombosis signs and symptoms(Stroke Metric)  Outcome: Progressing Towards Goal  Goal: *Describes available resources and support systems  Outcome: Progressing Towards Goal  Goal: *Optimal pain control at patient's stated goal  Outcome: Progressing Towards Goal

## 2021-12-03 NOTE — PROGRESS NOTES
Nephrology Progress Note  Blaise Leblanc     www. [facility]  Phone - [phone]   Patient: Edu Carbajal    YOB: 1975        Date- 12/3/2021   Admit Date: 11/27/2021  CC: Follow up for  ESRD          IMPRESSION & PLAN:   · ESRD- home HD 5 days per week- Follows up with Dr Sheela Najera at [facility] ( now [facility] HD for IV antibiotics.)  · Covid 19 infection  · hyperkalemia  · Left arm swelling- s/p angioplasty of left subclavian vein  · Gram positive sepsis from dental abcess  · s/p lead extraction at [facility]  · Anemia of ckd  · Hx of renal tx in past times 2.  · Hypertension  · CAD  · Failed RTX times 2         H/o DVT, s/p ivc filter/ h/o HIT      PLAN-   Plan for hemodialysis today.  Next HD will be on Monday   k should improve with dialysis today   Continue phoslo   EPO for anemia     Subjective: Interval History:   Seen and examined today   SOB this am      Objective:   Vitals:    12/02/21 2012 12/03/21 0644 12/03/21 0809 12/03/21 1038   BP: 107/68  110/73    Pulse: 72  72    Resp: 18  16    Temp: 98.5 °F (36.9 °C)  98.2 °F (36.8 °C)    TempSrc:       SpO2: 95%  95%    Weight:  63.2 kg (139 lb 5.3 oz)     Height:    5' 7\" (1.702 m)      12/02 0701 - 12/03 0700  In: 600 [P.O.:600]  Out: 2000   Last 3 Recorded Weights in this Encounter    12/01/21 0501 12/02/21 0636 12/03/21 0644   Weight: 63.9 kg (140 lb 12.8 oz) 64.7 kg (142 lb 10.2 oz) 63.2 kg (139 lb 5.3 oz)      Physical exam:   GEN: NAD  NECK- Supple  RESP: no distress  NEURO:non focal      Chart reviewed. Pertinent Notes reviewed.      Data Review :  Recent Labs     12/02/21  1410 12/01/21  0440   * 133*   K 3.9 5.3*   CL 97 96*   CO2 31 25   BUN 39* 67*   CREA 5.05* 7.29*   * 130*   CA 8.6 8.4*   MG  --  2.8*   PHOS  --  5.5*     Recent Labs     12/01/21  0440   WBC 10.3   HGB 9.7*   HCT 30.0*   PLT 88*     Recent Labs     12/02/21  0730   FERR 3,127*      Medication list reviewed  Current Facility-Administered Medications   Medication Dose Route Frequency    sucralfate (CARAFATE) tablet 1 g  1 g Oral AC&HS    HYDROmorphone (DILAUDID) injection 2 mg  2 mg IntraVENous Q4H PRN    metoprolol succinate (TOPROL-XL) XL tablet 12.5 mg  12.5 mg Oral DAILY    epoetin emilie-epbx (RETACRIT) 12,000 Units combo injection  12,000 Units SubCUTAneous Q MON, WED & FRI    calcium carbonate (TUMS) chewable tablet 200 mg [elemental]  200 mg Oral TID WITH MEALS    aluminum-magnesium hydroxide (MAALOX) oral suspension 15 mL  15 mL Oral QID PRN    famotidine (PEPCID) tablet 20 mg  20 mg Oral DAILY    nitroglycerin (NITROSTAT) tablet 0.4 mg  0.4 mg SubLINGual PRN    sodium chloride (NS) flush 5-10 mL  5-10 mL IntraVENous PRN    acetaminophen (TYLENOL) tablet 650 mg  650 mg Oral Q6H PRN    L.acidophilus-paracasei-S.thermophil-bifidobacter (RISAQUAD) 8 billion cell capsule  1 Capsule Oral DAILY    aspirin chewable tablet 81 mg  81 mg Oral DAILY    calcitRIOL (ROCALTROL) capsule 0.5 mcg  0.5 mcg Oral DAILY    calcium acetate(phosphat bind) (PHOSLO) capsule 1,334 mg  2 Capsule Oral TID WITH MEALS    pantoprazole (PROTONIX) tablet 40 mg  40 mg Oral ACB    sodium chloride (NS) flush 5-40 mL  5-40 mL IntraVENous Q8H    sodium chloride (NS) flush 5-40 mL  5-40 mL IntraVENous PRN    polyethylene glycol (MIRALAX) packet 17 g  17 g Oral DAILY PRN    ondansetron (ZOFRAN ODT) tablet 4 mg  4 mg Oral Q8H PRN    Or    ondansetron (ZOFRAN) injection 4 mg  4 mg IntraVENous Q6H PRN    oxyCODONE IR (ROXICODONE) tablet 5 mg  5 mg Oral Q4H PRN    ascorbic acid (vitamin C) (VITAMIN C) tablet 500 mg  500 mg Oral DAILY    guaiFENesin ER (MUCINEX) tablet 600 mg  600 mg Oral Q12H    albuterol (PROVENTIL HFA, VENTOLIN HFA, PROAIR HFA) inhaler 2 Puff  2 Puff Inhalation Q6H PRN    Warfarin - Pharmacy to Dose   Other Rx Dosing/Monitoring    Vancomycin - Pharmacy to dose   Other Rx Dosing/Monitoring          Ángel Orellana Gianna Workman, [address] [facility]  [facility] / ROBBY [employer] MILLER [facility], [address]  [place], [address]  Phone - [phone]               Fax - [phone]

## 2021-12-03 NOTE — PROGRESS NOTES
Name: Jean Jones   : 1975 Admit Date: 2021   Phone: [phone]  Room: [facility]/   PCP: Shiela Leonardo MD  MRN: [id number]   Date: 12/3/2021  Code: Full Code          IMPRESSION  · Acute Respiratory Failure with Hypoxia- now on room air  · Covid-19; CXR now with interstitial edema , CRP still 4-5 despite steroids  · Unvaccinated for COVID  · Recurrent chest pain- unlikely PE, therapeutic Warfarin; given bad GERD, steroids would be concerned about developing erosive gastritis or esophagitis; Pulmonary HTN can be associated with atypical chest pain  · Elevated procalcitonin despite abx- source? Still high but declining  · Loose stools- abx?  At risk for C diff  · BLE edema- was not present one week ago per pateint  · ESRD on HD  · Chronic abdominal pain  · Pancreatitis- chemcal; gallbladder removed, pancreas not abnormal on last Ct scan; LIPASE > 700  · CM s/p ICD placement  · Severe Pulmonary HTN   · CHF LVEF 35-40%  · Left arm swelling- s/p angioplasty of left subclavian vein  · Gram positive sepsis from dental abscess- left tooth  · s/p lead extraction at [facility]  · Anemia of ckd  · Hx of renal tx in past times 2.  · Hypertension  · CAD  · Failed RTX times 2  · H/o DVT, s/p ivc filter/ h/o HIT   · Contrast allergy in history- no details     PLAN  · Check exercise sats- desaturations can be early warning sign of Covid pneumonia  · Consider stopping antibiotics given loose stool and observing  · Follow Lipase  · Fluid removal by HD per Neephrology  · Continue close observation  · Prn Supplemental oxygen to keep sats >90%  · Can stop Dexamethasone; low threshold to increase if he worsens  · Start Baricitinib if oxygen requirements increase  · Treat GERD  · On Cefepime  · Renal following  · Coumadin dosed per pharmacy  · Trend inflammatory markers  · Encourage IS use, proning  · At high risk for decompensation given his comorbidities   · COVID vaccine counseling done  · Will see after weekend or sooner prn       12/3/21: OOB in chair on room air. sats 99%. He is concerned about the swelling in legs and left arm this past week. Notes and data reviewed. No cough. Now with loose stools!! No nausea. Denies cough congestion. Denies shortness of breath at rest.  Need to monitor him closely. CXR with mild interstitial edema vs early COVID infiltrates. No better after HD but  Volume not removed per pt. If he starts needing O2 and CRP higher, will add Baricitinib. PCT down but still high and > 7. Ferritin high. CRp slightly down. 12/2/21: On HD. No SOB. On Room air. Discussed with dr. Kenzie Palomo. Procalcitonin was very high. Notes and data reviewed. No nausea. Denies cough congestion. Now on room air. Denies shortness of breath at rest.  Need to monitor him closely. CXR now with mild interstitial edema vs early COVID infiltrates? If he starts needing O2 and CRP higher, will add Baricitinib. Would exercise and seee if he drops sats. If CXR improves after HD, likely pulmonary edema. 12/1/21: On Decadron. Chest and abdominal pain all night. Notes and data reviewed. No nausea. Denies cough congestion. Now on room air. Denies shortness of breath at rest.  Need to monitor him closely. Course is unpredictable. CRP same but CXR now with mild interstitial edema vs early COVID infiltrates? If he starts needing O2 and CRP higher, will add Baricitinib      11/30/21: Lots of heartburn this AM. On Decadron. NOtes and data reviewed. No nausea. Denies cough congestion. Now on room air. Denies shortness of breath at rest.  Need to monitor him closely. Course is unpredictable. If CRP normal in AM, would allow us to be more comfortable if discharged on steroids. If CRP higher, will add Baricitinib     11/29/21: Events of weekend, Notes and data reviewed. Patient now starting hemodialysis. Has back and abdominal discomfort. No nausea. Denies cough congestion. Now on room air. Denies shortness of breath at rest.  Explained to the patient the potential of him getting much worse. Need to monitor him closely. Course is unpredictable. Currently on some treatment. Not a candidate for Remdesivir. Chart and notes reviewed. Data reviewed. I review the patient's current medications in the medical record at each encounter.  I have evaluated and examined the patient. 8:40 AM       History was obtained from patient. I was asked by Aster Arellano MD to see Augustina Enriquez in consultation for a chief complaint of Covid-19 infection. History of Present Illness:  Mr.Gary will a 43 yo gentleman with a PMH ESRD on HD MWF, hypertension, ischemic cardiomyopathy s/pp ICD placement, and GERD that presented to the ED on 11/27 with worsening fatigue and SOB. Rapid covid test positive. He is unvaccinated against Covid-19. He is currently on RA on my exam today with sats at 96%. Feels a little better. Images:      CXR 11/27: no acute process    Abdominal CT 11/27:  Relatively small right pleural effusion and minimal left pleural effusion, increased since 11/5/2021. Reggie Gutter No acute findings in the abdomen or pelvis. .  Renal atrophy and calcified transplant kidneys. Hepatomegaly. No focal lesion. Small amount of ascites. Anasarca. D-dimer 3.15  Lactic acid 8.5  Trop 519  Ferritin 9604  Procalcitonin 26.76  CRP 9.91  Blood cultures negative x 18 hours  ECHO 11/7/21: EF 35-40%, mildly dilated LA, mildly dilated RV, mildly reduced systolic function, trave MVR, moderate TVR. Severe pulmonary hypertension RVSP 85mmHg,     Past Medical History:   Diagnosis Date    Abdominal hematoma     AICD (automatic cardioverter/defibrillator) present     CAD (coronary artery disease)     anterior MI s/p 2 stents  2007    Chronic abdominal pain     Chronic kidney disease     ESRD; Dialysis dependent.  [facility]- [facility] tpke    DVT (deep venous thrombosis) ([facility] 2001    DVT of popliteal vein ([address] 11/2011    not anticoagulated due to bleeding, IVC filter    Endocarditis     Gallstone pancreatitis     Gastrointestinal disorder     acid reflux    Gastrointestinal disorder     peptic ulcer    Hemodialysis patient ([address]     High cholesterol     Hypertension     Kidney transplant     b/l kidneys 1995, 2001    Long term current use of anticoagulant therapy     Nephrotic syndrome     Other ill-defined conditions(799.89)     kidny transplant x2,  on dialysis    Other ill-defined conditions(799.89)     home dialysis    Other ill-defined conditions(799.89)     hx recurrent left leg DVT    Peritonitis ([address]     Seizures ([address] 2015    most recent- only had three in lifetime    Small bowel obstruction (HCC)     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    V-[facility]ach [facility])        Past Surgical History:   Procedure Laterality Date    HX APPENDECTOMY      HX CHOLECYSTECTOMY      HX OTHER SURGICAL  11/2011    exploratory laparotomy    HX OTHER SURGICAL      fem-pop    HX OTHER SURGICAL  02/2013    right upper extremity fistula    HX PACEMAKER Left 6/2009    AICD-st latonya-not connected    HX PACEMAKER Left     AICD-left side-BS-working    HX SMALL BOWEL RESECTION      HX TRANSPLANT  2001     Kidney    HX TRANSPLANT  1992    kidney    HX VASCULAR ACCESS Right     femoral access for HD- does 5 day dialysis @ home    IR REMOVE TUNL CVAD W/O PORT / PUMP  10/29/2020    IR REPLACE CVC TUNNELED W/O PORT  9/10/2020    ND CARDIAC SURG PROCEDURE UNLIST  2007    2 stents    ND EDG US EXAM SURGICAL ALTER STOM DUODENUM/JEJUNUM  7/15/2011         ND ESOPHAGOGASTRODUODENOSCOPY TRANSORAL DIAGNOSTIC  5/24/2012         VASCULAR SURGERY PROCEDURE UNLIST  11/14/2017    Insertion AV graft right upper arm (bovine); ligation of AV fistula right arm       Family History   Problem Relation Age of Onset    COPD Mother     Lung Disease Mother     Cancer Father         stomach  Cancer Maternal Grandmother        Social History     Tobacco Use    Smoking status: Never Smoker    Smokeless tobacco: Never Used   Substance Use Topics    Alcohol use: No       Allergies   Allergen Reactions    Shellfish Containing Products Swelling     Break out in rash, trouble breathing    Contrast Agent [Iodine] Shortness of Breath    Heparin Analogues Other (comments)     Positive history of HIT       Current Facility-Administered Medications   Medication Dose Route Frequency    sucralfate (CARAFATE) tablet 1 g  1 g Oral AC&HS    HYDROmorphone (DILAUDID) injection 2 mg  2 mg IntraVENous Q4H PRN    metoprolol succinate (TOPROL-XL) XL tablet 12.5 mg  12.5 mg Oral DAILY    epoetin emilie-epbx (RETACRIT) 12,000 Units combo injection  12,000 Units SubCUTAneous Q MON, WED & FRI    calcium carbonate (TUMS) chewable tablet 200 mg [elemental]  200 mg Oral TID WITH MEALS    aluminum-magnesium hydroxide (MAALOX) oral suspension 15 mL  15 mL Oral QID PRN    famotidine (PEPCID) tablet 20 mg  20 mg Oral DAILY    nitroglycerin (NITROSTAT) tablet 0.4 mg  0.4 mg SubLINGual PRN    sodium chloride (NS) flush 5-10 mL  5-10 mL IntraVENous PRN    acetaminophen (TYLENOL) tablet 650 mg  650 mg Oral Q6H PRN    L.acidophilus-paracasei-S.thermophil-bifidobacter (RISAQUAD) 8 billion cell capsule  1 Capsule Oral DAILY    aspirin chewable tablet 81 mg  81 mg Oral DAILY    calcitRIOL (ROCALTROL) capsule 0.5 mcg  0.5 mcg Oral DAILY    calcium acetate(phosphat bind) (PHOSLO) capsule 1,334 mg  2 Capsule Oral TID WITH MEALS    pantoprazole (PROTONIX) tablet 40 mg  40 mg Oral ACB    sodium chloride (NS) flush 5-40 mL  5-40 mL IntraVENous Q8H    sodium chloride (NS) flush 5-40 mL  5-40 mL IntraVENous PRN    polyethylene glycol (MIRALAX) packet 17 g  17 g Oral DAILY PRN    ondansetron (ZOFRAN ODT) tablet 4 mg  4 mg Oral Q8H PRN    Or    ondansetron (ZOFRAN) injection 4 mg  4 mg IntraVENous Q6H PRN    oxyCODONE IR (ROXICODONE) tablet 5 mg  5 mg Oral Q4H PRN    ascorbic acid (vitamin C) (VITAMIN C) tablet 500 mg  500 mg Oral DAILY    zinc sulfate (ZINCATE) 50 mg zinc (220 mg) capsule 1 Capsule  1 Capsule Oral DAILY    guaiFENesin ER (MUCINEX) tablet 600 mg  600 mg Oral Q12H    albuterol (PROVENTIL HFA, VENTOLIN HFA, PROAIR HFA) inhaler 2 Puff  2 Puff Inhalation Q6H PRN    dexAMETHasone (DECADRON) tablet 6 mg  6 mg Oral DAILY    Warfarin - Pharmacy to Dose   Other Rx Dosing/Monitoring    Vancomycin - Pharmacy to dose   Other Rx Dosing/Monitoring         REVIEW OF SYSTEMS   Negative except as stated in the HPI. Physical Exam:   Visit Vitals  /73 (BP 1 Location: Right upper arm, BP Patient Position: Sitting)   Pulse 72   Temp 98.2 °F (36.8 °C)   Resp 16   Ht 5' 7\" (1.702 m)   Wt 63.2 kg (139 lb 5.3 oz)   SpO2 95%   BMI 21.82 kg/m²       General:  Alert, cooperative, no distress, appears stated age. Head:  Normocephalic, without obvious abnormality, atraumatic. Eyes:  Conjunctivae/corneas clear. Nose: Nares normal. Septum midline. Mucosa normal.    Throat: Lips, mucosa, and tongue normal. Teeth and gums normal.   Neck: Supple, symmetrical, trachea midline. Lungs:   Clear to auscultation bilaterally. Chest wall:  No tenderness or deformity. Heart:  Regular rate and rhythm, S1, S2 normal, no murmur, click, rub or gallop. Abdomen:   Soft, non-tender. Bowel sounds normal. No masses,  No organomegaly. Extremities: Extremities normal, atraumatic, no cyanosis or edema. Pulses: 2+ and symmetric all extremities.    Skin: Skin color, texture, turgor normal. No rashes or lesions   Lymph nodes: Cervical, supraclavicular, and axillary nodes normal.   Neurologic: Grossly nonfocal       Lab Results   Component Value Date/Time    Sodium 134 (L) 12/02/2021 02:10 PM    Potassium 3.9 12/02/2021 02:10 PM    Chloride 97 12/02/2021 02:10 PM    CO2 31 12/02/2021 02:10 PM    BUN 39 (H) 12/02/2021 02:10 PM    Creatinine 5.05 (H) 12/02/2021 02:10 PM    Glucose 118 (H) 12/02/2021 02:10 PM    Calcium 8.6 12/02/2021 02:10 PM    Magnesium 2.8 (H) 12/01/2021 04:40 AM    Phosphorus 5.5 (H) 12/01/2021 04:40 AM    Lactic acid 1.6 12/02/2021 07:30 AM       Lab Results   Component Value Date/Time    WBC 10.3 12/01/2021 04:40 AM    HGB 9.7 (L) 12/01/2021 04:40 AM    PLATELET 88 (L) 27/16/3212 04:40 AM    MCV 97.7 12/01/2021 04:40 AM       Lab Results   Component Value Date/Time    INR 3.8 (H) 12/02/2021 07:30 AM    aPTT 47.0 (H) 11/05/2021 04:34 PM    Alk.  phosphatase 257 (H) 12/02/2021 02:10 PM    Protein, total 8.1 12/02/2021 02:10 PM    Albumin 3.2 (L) 12/02/2021 02:10 PM    Globulin 4.9 (H) 12/02/2021 02:10 PM       Lab Results   Component Value Date/Time    Iron 57 11/22/2018 05:27 AM    TIBC 234 (L) 11/22/2018 05:27 AM    Iron % saturation 24 11/22/2018 05:27 AM    Ferritin 3,127 (H) 12/02/2021 07:30 AM       Lab Results   Component Value Date/Time    C-Reactive protein 3.20 (H) 12/02/2021 07:30 AM    TSH 1.28 09/26/2014 08:32 PM        No results found for: PH, PHI, PCO2, PCO2I, PO2, PO2I, HCO3, HCO3I, FIO2, FIO2I    Lab Results   Component Value Date/Time     01/16/2019 07:09 AM    CK-MB Index 2.0 01/16/2019 07:09 AM    Troponin-I, Qt. <0.05 03/29/2021 05:06 AM    BNP 1,946 (H) 09/16/2014 04:17 AM        Lab Results   Component Value Date/Time    Culture result: NO GROWTH 6 DAYS 11/27/2021 02:05 PM    Culture result: NO GROWTH 6 DAYS 11/27/2021 01:40 PM    Culture result: NO GROWTH 5 DAYS 11/07/2021 09:50 AM       Lab Results   Component Value Date/Time    Hepatitis B surface Ag 0.50 12/01/2021 04:40 AM       Lab Results   Component Value Date/Time    Vancomycin,trough 17.7 (H) 12/08/2011 02:59 AM    Vancomycin,trough 19.5 (H) 12/02/2011 02:40 AM     01/16/2019 07:09 AM    CK 62 05/20/2016 06:34 AM       Lab Results   Component Value Date/Time    Color RED 11/16/2012 05:15 PM    Appearance OPAQUE 11/16/2012 05:15 PM Specific gravity 1.020 11/16/2012 05:15 PM    pH (UA) 8.0 11/16/2012 05:15 PM    Protein >300 (A) 11/16/2012 05:15 PM    Glucose 100 (A) 11/16/2012 05:15 PM    Ketone NEGATIVE  11/16/2012 05:15 PM    Bilirubin NEGATIVE  09/17/2012 01:35 AM    Blood MODERATE (A) 11/16/2012 05:15 PM    Urobilinogen 0.2 11/16/2012 05:15 PM    Nitrites NEGATIVE  11/16/2012 05:15 PM    Leukocyte Esterase NEGATIVE  11/16/2012 05:15 PM    WBC >100 (H) 11/16/2012 05:15 PM    RBC >100 (H) 11/16/2012 05:15 PM    Bacteria 3+ (A) 11/16/2012 05:15 PM     ·     Thanks for the consult.       Pily Zamora MD

## 2021-12-03 NOTE — PROGRESS NOTES
End of Shift Note    Bedside shift change report given to RN (oncoming nurse) by Karma Palmer RN (offgoing nurse). Report included the following information SBAR, Kardex, ED Summary, Procedure Summary, Intake/Output, MAR, Recent Results, Med Rec Status and Cardiac Rhythm Sinus Rhythm    Shift worked:  7p-7a     Shift summary and any significant changes:     gave dilaudid Q4hrs. As pt requested, slept majority of shift. Unable to get AM labwork, will tell day shift and see if dialysis will get access     Concerns for physician to address:       Zone phone for oncoming shift:          Activity:  Activity Level: Up with Assistance  Number times ambulated in hallways past shift: 0  Number of times OOB to chair past shift: 0    Cardiac:   Cardiac Monitoring: Yes      Cardiac Rhythm: Sinus Rhythm    Access:   Current line(s): PIV and HD access     Genitourinary:   Urinary status: anuric    Respiratory:   O2 Device: None (Room air)  Chronic home O2 use?: NO  Incentive spirometer at bedside: NO     GI:  Last Bowel Movement Date: 12/01/21  Current diet:  ADULT DIET Regular; Low Fat/Low Chol/High Fiber/2 gm Na; Low Potassium (Less than 3000 mg/day); Low Phosphorus (Less than 1000 mg)  DIET ONE TIME MESSAGE  Passing flatus: YES  Tolerating current diet: YES       Pain Management:   Patient states pain is manageable on current regimen: YES    Skin:  Yong Score: 20  Interventions: float heels, increase time out of bed and limit briefs    Patient Safety:  Fall Score:  Total Score: 4  Interventions: bed/chair alarm, gripper socks, pt to call before getting OOB and stay with me (per policy)  High Fall Risk: Yes    Length of Stay:  Expected LOS: 5d 9h  Actual LOS: Remy Tam RN

## 2021-12-03 NOTE — PROGRESS NOTES
Transition of Care Plan:     RUR:24% high risk  Disposition:home   Follow up appointments:PCP, oral surgeon-Dr Chris Beaulieu, [phone]- scheduled for Dec 23rd @10:30am  DME needed:RW and shower chair  Transportation at [id number]  [facility] will transport   [place] or means to access home:     Yes   IM Medicare Letter: Will provide upon d/c   Is patient a BCPI-A Bundle:                      If yes, was Bundle Letter given?:     Caregiver Contact:sisterCatherine [phone]  Discharge Caregiver contacted prior to discharge? Update 1600:  CM spoke to [facility] at [facility]. CM to f/u on Monday re: d/c on IV abx and the need for I center dialysis vs home dialysis. CM to fax run sheets. Pt accepted by [facility] for a RW. Initial note:  Chart reviewed. CM contacted pt via telephone due to Covid status to discuss IPR. Pt declined and would rather go home. CM discussed HH and he is agreeable. Referrals sent to [facility], [facility], and [facility]. Referral also sent [facility] for RW and shower chair. FOC placed on chart. CM will continue to monitor for d/c needs.     Trudy Trinh, MSN  Care Manager  [phone]

## 2021-12-03 NOTE — PROGRESS NOTES
Hospitalist Progress Note    NAME: Stef Carty   :  1975   MRN:  [id number]       Assessment / Plan:  Acute Covid 19 infection syndrome POA  Hypoxemia, resolved  Recent Streptococcus intermedius bacteremia (2021)  -onset of symptoms past Wednesday 3 days PTA, not vaccinated  -initially required only 2L, now off oxygen and >97% on RA  -Rapid Covid test positive  -Blood culture NGTD  -continue to dose IV Vanco prn. Follow random levels. Per last discharge summary, plan is to continue Vanco until Dec 6. Will ask ID for input given no clear explanation for his elevated procalcitonin  -Procalcitonin 26 on admission, repeat is 7.9  -continue decadron. Follow CRP and ferritin - labs today pending  -cefepime stopped (received -)  -should keep appointment with Dr Ulysses Smack, oral surgery, on 21    Abdominal pain  Transaminitis  Elevated lipase  Heartburn   -CT abdomen:  Nothing acute  -Abd US Uniform liver. Absent gallbladder without biliary dilation. Small quantity ascites  -acute hepatitis panel NEG  -LFTs are trending down. 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 Fentanyl not helping either. After some discussion, will allow dilaudid    GERD  -PPI.   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 Estimated discharge date: Nov 30, 2021  Barriers: COVID-19, Improvement of transaminitis         Subjective:     Chief Complaint / Reason for Physician Visit  Follow up elevated LFTs, elevated lipase, COVID, possible PNA vs CHF    Review of Systems:  Symptom Y/N Comments  Symptom Y/N Comments   Fever/Chills    Chest Pain     Poor Appetite    Edema     Cough    Abdominal Pain     Sputum    Joint Pain     SOB/ZAMUDIO    Pruritis/Rash     Nausea/vomit    Tolerating PT/OT     Diarrhea    Tolerating Diet     Constipation    Other       Could NOT obtain due to:      Objective:     VITALS:   Last 24hrs VS reviewed since prior progress note. Most recent are:  Patient Vitals for the past 24 hrs:   Temp Pulse Resp BP SpO2   12/03/21 1155 97.7 °F (36.5 °C) 82 18 112/69 95 %   12/03/21 0809 98.2 °F (36.8 °C) 72 16 110/73 95 %   12/02/21 2012 98.5 °F (36.9 °C) 72 18 107/68 95 %       Intake/Output Summary (Last 24 hours) at 12/3/2021 1602  Last data filed at 12/3/2021 0236  Gross per 24 hour   Intake 480 ml   Output 0 ml   Net 480 ml        I had a face to face encounter and independently examined this patient on 12/3/2021, as outlined below:  PHYSICAL EXAM:    General:          Alert, cooperative, no distress, appears stated age. HEENT:           Atraumatic, anicteric sclerae, pink conjunctivae                          No oral ulcers, mucosa moist, throat clear, dentition fair  Neck:               Supple, symmetrical,  thyroid: non tender  Lungs:             Clear to auscultation bilaterally. No Wheezing or Rhonchi. No rales. Chest wall:      No tenderness  No Accessory muscle use.   Left chest wall AICD noted  Heart:              Regular  rhythm,  No  murmur   2+ LE edema bilaterally  Abdomen:        Soft, non-tender. Not distended. Bowel sounds normal  Extremities:     No cyanosis. No clubbing, left arm AV fistula noted with good thrill                          Skin turgor normal, Capillary refill normal, Radial dial pulse 2+  Skin:                Not pale. Not Jaundiced  No rashes   Psych:             Good insight. Not depressed. Not anxious or agitated. Neurologic:      EOMs intact. No facial asymmetry. No aphasia or slurred speech. Symmetrical strength, Sensation grossly intact. Alert and oriented X 4. Reviewed most current lab test results and cultures  YES  Reviewed most current radiology test results   YES  Review and summation of old records today    NO  Reviewed patient's current orders and MAR    YES  PMH/SH reviewed - no change compared to H&P  ________________________________________________________________________  Care Plan discussed with:    Comments   Patient     Family      RN     Care Manager     Consultant                        Multidiciplinary team rounds were held today with , nursing, pharmacist and clinical coordinator. Patient's plan of care was discussed; medications were reviewed and discharge planning was addressed. ________________________________________________________________________  Total NON critical care TIME:  31   Minutes    Total CRITICAL CARE TIME Spent:   Minutes non procedure based      Comments   >50% of visit spent in counseling and coordination of care     ________________________________________________________________________  Bibi Gallagher MD     Procedures: see electronic medical records for all procedures/Xrays and details which were not copied into this note but were reviewed prior to creation of Plan. LABS:  I reviewed today's most current labs and imaging studies.   Pertinent labs include:  Recent Labs     12/01/21  0440   WBC 10.3   HGB 9.7*   HCT 30.0*   PLT 88*     Recent Labs     12/02/21  1410 12/02/21  0730 12/01/21  0440   *  --  133*   K 3.9  --  5.3*   CL 97  --  96*   CO2 31  --  25   *  --  130*   BUN 39*  --  67*   CREA 5.05*  --  7.29*   CA 8.6  --  8.4*   MG  --   --  2.8*   PHOS  --   --  5.5*   ALB 3.2*  --  3.1*   TBILI 2.1*  --  1.1*   *  --  590*   INR  --  3.8* 3.0*       Signed: Bibi Gallagher MD

## 2021-12-03 NOTE — PROGRESS NOTES
Comprehensive Nutrition Assessment    Type and Reason for Visit: Initial, RD nutrition re-screen/LOS    Nutrition Recommendations/Plan:   Continue renal diet restrictions as needed  Add Nepro shakes TID  Please document % meals and supplements consumed in flowsheet I/O's under intake    Recommend clear liquid diet with Ensure clear supplements if pt has pancreatitis     Nutrition Assessment:      Chart reviewed for LOS. Pt noted for COVID-19 (unvaccinated), recent bacteremia (Nov), abdominal pain, transaminitis, elevated lipase, possible pancreatitis, heart burn, ESRD on HD, HTN, ischemic cardiomyopathy s/p AICD, CAD, acute on chronic pain syndrome, GERD. New loose stools per pulmonology, at risk for c.diff. RD attempted to call pt d/t respiratory isolation, but he did not answer. No lab work yet for today. Poor PO intake documented. Notes indicate pt reported eating makes his abdominal pain worse. Pt drinks Nepro, per past RD notes. Will add this TID and continue monitoring. May need trial on clear liquids if pt has pancreatitis. Patient Vitals for the past 168 hrs:   % Diet Eaten   12/02/21 1717 1 - 25%   12/02/21 1248 1 - 25%   12/01/21 0323 0%   11/30/21 1317 1 - 25%   11/30/21 0845 1 - 25%   11/29/21 1624 26 - 50%   11/29/21 1230 1 - 25%   11/28/21 1237 0%   11/28/21 0730 1 - 25%     Wt Readings from Last 10 Encounters:   12/03/21 63.2 kg (139 lb 5.3 oz)   11/17/21 66.1 kg (145 lb 12.8 oz)   04/07/21 66.8 kg (147 lb 4.3 oz)   03/27/21 67.4 kg (148 lb 9.4 oz)   11/08/20 68.6 kg (151 lb 4.8 oz)   11/07/20 67.1 kg (148 lb)   10/20/20 67.5 kg (148 lb 13 oz)   09/13/20 70 kg (154 lb 5.2 oz)   09/10/20 70 kg (154 lb 5.2 oz)   09/08/20 66.7 kg (147 lb 0.8 oz)   ]    Malnutrition Assessment:  Malnutrition Status: At risk    Estimated Daily Nutrient Needs:  Energy (kcal): 1925 kcals (BMR 1481 x 1. 3AF); Weight Used for Energy Requirements: Current  Protein (g): 76-88g (1.2-1.4g/kg);  Weight Used for Protein Requirements: Current  Fluid (ml/day): 1500mL; Method Used for Fluid Requirements: Standard renal      Nutrition Related Findings:  Labs: (12/2) lipase 704, phos 5.5,  3.9. Meds: vit C, calcitriol, phslo, tums, retacrit, risaquad, sucralfate, dilaudid. Trace edema. BM 12/1. Wounds:    None       Current Nutrition Therapies:  ADULT DIET Regular; Low Fat/Low Chol/High Fiber/2 gm Na; Low Potassium (Less than 3000 mg/day); Low Phosphorus (Less than 1000 mg)  DIET ONE TIME MESSAGE    Anthropometric Measures:  · Height:  5' 7\" (170.2 cm)  · Current Body Wt:  63.2 kg (139 lb 5.3 oz)   · Ideal Body Wt:  148 lbs:  94.1 %   · BMI Category:  Normal weight (BMI 18.5-24. 9)       Nutrition Diagnosis:   · Inadequate protein-energy intake related to renal dysfunction, increased demand for energy/nutrients (poor appetite) as evidenced by intake 0-25% (ESRD on HD)      Nutrition Interventions:   Food and/or Nutrient Delivery: Continue current diet, Start oral nutrition supplement  Nutrition Education and Counseling: No recommendations at this time  Coordination of Nutrition Care: Continue to monitor while inpatient    Goals:  PO intake >60% meals and supplements next 3-5 days       Nutrition Monitoring and Evaluation:   Behavioral-Environmental Outcomes: None identified  Food/Nutrient Intake Outcomes: Food and nutrient intake, Supplement intake  Physical Signs/Symptoms Outcomes: Biochemical data, GI status, Fluid status or edema, Weight    Discharge Planning:    Continue current diet, Continue oral nutrition supplement     Electronically signed by Cruz London RD on 12/3/2021 at 11:02 AM    Contact: [id number]

## 2021-12-03 NOTE — PROGRESS NOTES
Problem: Airway Clearance - Ineffective  Goal: Achieve or maintain patent airway  Outcome: Progressing Towards Goal     Problem: Gas Exchange - Impaired  Goal: Absence of hypoxia  Outcome: Progressing Towards Goal  Goal: Promote optimal lung function  Outcome: Progressing Towards Goal     Problem: Breathing Pattern - Ineffective  Goal: Ability to achieve and maintain a regular respiratory rate  Outcome: Progressing Towards Goal     Problem: Body Temperature -  Risk of, Imbalanced  Goal: Ability to maintain a body temperature within defined limits  Outcome: Progressing Towards Goal  Goal: Will regain or maintain usual level of consciousness  Outcome: Progressing Towards Goal  Goal: Complications related to the disease process, condition or treatment will be avoided or minimized  Outcome: Progressing Towards Goal     Problem: Isolation Precautions - Risk of Spread of Infection  Goal: Prevent transmission of infectious organism to others  Outcome: Progressing Towards Goal     Problem: Nutrition Deficits  Goal: Optimize nutrtional status  Outcome: Progressing Towards Goal     Problem: Risk for Fluid Volume Deficit  Goal: Maintain normal heart rhythm  Outcome: Progressing Towards Goal  Goal: Maintain absence of muscle cramping  Outcome: Progressing Towards Goal  Goal: Maintain normal serum potassium, sodium, calcium, phosphorus, and pH  Outcome: Progressing Towards Goal     Problem: Loneliness or Risk for Loneliness  Goal: Demonstrate positive use of time alone when socialization is not possible  Outcome: Progressing Towards Goal     Problem: Fatigue  Goal: Verbalize increase energy and improved vitality  Outcome: Progressing Towards Goal     Problem: Patient Education: Go to Patient Education Activity  Goal: Patient/Family Education  Outcome: Progressing Towards Goal     Problem: Falls - Risk of  Goal: *Absence of Falls  Description: Document [place] Fall Risk and appropriate interventions in the flowsheet.   Outcome: Progressing Towards Goal  Note: Fall Risk Interventions:  Mobility Interventions: Bed/chair exit alarm, Communicate number of staff needed for ambulation/transfer, Patient to call before getting OOB    Mentation Interventions: Adequate sleep, hydration, pain control, Bed/chair exit alarm, Increase mobility, More frequent rounding, Reorient patient, Self-releasing belt, Toileting rounds, Update white board    Medication Interventions: Bed/chair exit alarm, Patient to call before getting OOB, Teach patient to arise slowly    Elimination Interventions: Bed/chair exit alarm, Patient to call for help with toileting needs, Stay With Me (per policy), Toilet paper/wipes in reach, Toileting schedule/hourly rounds, Call light in reach    History of Falls Interventions: Bed/chair exit alarm, Door open when patient unattended, Investigate reason for fall, Room close to nurse's station         Problem: Patient Education: Go to Patient Education Activity  Goal: Patient/Family Education  Outcome: Progressing Towards Goal     Problem: Patient Education: Go to Patient Education Activity  Goal: Patient/Family Education  Outcome: Progressing Towards Goal     Problem: Patient Education: Go to Patient Education Activity  Goal: Patient/Family Education  Outcome: Progressing Towards Goal     Problem: Patient Education: Go to Patient Education Activity  Goal: Patient/Family Education  Outcome: Progressing Towards Goal     Problem: Sepsis: Day of Diagnosis  Goal: Off Pathway (Use only if patient is Off Pathway)  Outcome: Progressing Towards Goal  Goal: *Fluid resuscitation  Outcome: Progressing Towards Goal  Goal: *Paired blood cultures prior to first dose of antibiotic  Outcome: Progressing Towards Goal  Goal: *First dose of  appropriate antibiotic within 3 hours of arrival to ED, within 1 hour of arrival to ICU  Outcome: Progressing Towards Goal  Goal: *Lactic acid with first set of blood cultures  Outcome: Progressing Towards Goal  Goal: *Pneumococcal immunization (if eligible)  Outcome: Progressing Towards Goal  Goal: *Influenza immunization (if eligible)  Outcome: Progressing Towards Goal  Goal: Activity/Safety  Outcome: Progressing Towards Goal  Goal: Consults, if ordered  Outcome: Progressing Towards Goal  Goal: Diagnostic Test/Procedures  Outcome: Progressing Towards Goal  Goal: Nutrition/Diet  Outcome: Progressing Towards Goal  Goal: Discharge Planning  Outcome: Progressing Towards Goal  Goal: Medications  Outcome: Progressing Towards Goal  Goal: Respiratory  Outcome: Progressing Towards Goal  Goal: Treatments/Interventions/Procedures  Outcome: Progressing Towards Goal  Goal: Psychosocial  Outcome: Progressing Towards Goal     Problem: Sepsis: Day 2  Goal: Off Pathway (Use only if patient is Off Pathway)  Outcome: Progressing Towards Goal  Goal: *Central Venous Pressure maintained at 8-12 mm Hg  Outcome: Progressing Towards Goal  Goal: *Hemodynamically stable  Outcome: Progressing Towards Goal  Goal: *Tolerating diet  Outcome: Progressing Towards Goal  Goal: Activity/Safety  Outcome: Progressing Towards Goal  Goal: Consults, if ordered  Outcome: Progressing Towards Goal  Goal: Diagnostic Test/Procedures  Outcome: Progressing Towards Goal  Goal: Nutrition/Diet  Outcome: Progressing Towards Goal  Goal: Discharge Planning  Outcome: Progressing Towards Goal  Goal: Medications  Outcome: Progressing Towards Goal  Goal: Respiratory  Outcome: Progressing Towards Goal  Goal: Treatments/Interventions/Procedures  Outcome: Progressing Towards Goal  Goal: Psychosocial  Outcome: Progressing Towards Goal     Problem: Sepsis: Day 3  Goal: Off Pathway (Use only if patient is Off Pathway)  Outcome: Progressing Towards Goal  Goal: *Central Venous Pressure maintained at 8-12 mm Hg  Outcome: Progressing Towards Goal  Goal: *Oxygen saturation within defined limits  Outcome: Progressing Towards Goal  Goal: *Vital sign stability  Outcome: Progressing Towards Goal  Goal: *Tolerating diet  Outcome: Progressing Towards Goal  Goal: *Demonstrates progressive activity  Outcome: Progressing Towards Goal  Goal: Activity/Safety  Outcome: Progressing Towards Goal  Goal: Consults, if ordered  Outcome: Progressing Towards Goal  Goal: Diagnostic Test/Procedures  Outcome: Progressing Towards Goal  Goal: Nutrition/Diet  Outcome: Progressing Towards Goal  Goal: Discharge Planning  Outcome: Progressing Towards Goal  Goal: Medications  Outcome: Progressing Towards Goal  Goal: Respiratory  Outcome: Progressing Towards Goal  Goal: Treatments/Interventions/Procedures  Outcome: Progressing Towards Goal  Goal: Psychosocial  Outcome: Progressing Towards Goal     Problem: Sepsis: Day 4  Goal: Off Pathway (Use only if patient is Off Pathway)  Outcome: Progressing Towards Goal  Goal: Activity/Safety  Outcome: Progressing Towards Goal  Goal: Consults, if ordered  Outcome: Progressing Towards Goal  Goal: Diagnostic Test/Procedures  Outcome: Progressing Towards Goal  Goal: Nutrition/Diet  Outcome: Progressing Towards Goal  Goal: Discharge Planning  Outcome: Progressing Towards Goal  Goal: Medications  Outcome: Progressing Towards Goal  Goal: Respiratory  Outcome: Progressing Towards Goal  Goal: Treatments/Interventions/Procedures  Outcome: Progressing Towards Goal  Goal: Psychosocial  Outcome: Progressing Towards Goal  Goal: *Oxygen saturation within defined limits  Outcome: Progressing Towards Goal  Goal: *Hemodynamically stable  Outcome: Progressing Towards Goal  Goal: *Vital signs within defined limits  Outcome: Progressing Towards Goal  Goal: *Tolerating diet  Outcome: Progressing Towards Goal  Goal: *Demonstrates progressive activity  Outcome: Progressing Towards Goal  Goal: *Fluid volume maintenance  Outcome: Progressing Towards Goal     Problem: Sepsis: Day 5  Goal: Off Pathway (Use only if patient is Off Pathway)  Outcome: Progressing Towards Goal  Goal: *Oxygen saturation within defined limits  Outcome: Progressing Towards Goal  Goal: *Vital signs within defined limits  Outcome: Progressing Towards Goal  Goal: *Tolerating diet  Outcome: Progressing Towards Goal  Goal: *Demonstrates progressive activity  Outcome: Progressing Towards Goal  Goal: *Discharge plan identified  Outcome: Progressing Towards Goal  Goal: Activity/Safety  Outcome: Progressing Towards Goal  Goal: Consults, if ordered  Outcome: Progressing Towards Goal  Goal: Diagnostic Test/Procedures  Outcome: Progressing Towards Goal  Goal: Nutrition/Diet  Outcome: Progressing Towards Goal  Goal: Discharge Planning  Outcome: Progressing Towards Goal  Goal: Medications  Outcome: Progressing Towards Goal  Goal: Respiratory  Outcome: Progressing Towards Goal  Goal: Treatments/Interventions/Procedures  Outcome: Progressing Towards Goal  Goal: Psychosocial  Outcome: Progressing Towards Goal     Problem: Sepsis: Day 6  Goal: Off Pathway (Use only if patient is Off Pathway)  Outcome: Progressing Towards Goal  Goal: *Oxygen saturation within defined limits  Outcome: Progressing Towards Goal  Goal: *Vital signs within defined limits  Outcome: Progressing Towards Goal  Goal: *Tolerating diet  Outcome: Progressing Towards Goal  Goal: *Demonstrates progressive activity  Outcome: Progressing Towards Goal  Goal: Influenza immunization  Outcome: Progressing Towards Goal  Goal: *Pneumococcal immunization  Outcome: Progressing Towards Goal  Goal: Activity/Safety  Outcome: Progressing Towards Goal  Goal: Diagnostic Test/Procedures  Outcome: Progressing Towards Goal  Goal: Nutrition/Diet  Outcome: Progressing Towards Goal  Goal: Discharge Planning  Outcome: Progressing Towards Goal  Goal: Medications  Outcome: Progressing Towards Goal  Goal: Respiratory  Outcome: Progressing Towards Goal  Goal: Treatments/Interventions/Procedures  Outcome: Progressing Towards Goal  Goal: Psychosocial  Outcome: Progressing Towards Goal     Problem: Sepsis: Discharge Outcomes  Goal: *Vital signs within defined limits  Outcome: Progressing Towards Goal  Goal: *Tolerating diet  Outcome: Progressing Towards Goal  Goal: *Verbalizes understanding and describes prescribed diet  Outcome: Progressing Towards Goal  Goal: *Demonstrates progressive activity  Outcome: Progressing Towards Goal  Goal: *Describes follow-up/return visits to physicians  Outcome: Progressing Towards Goal  Goal: *Verbalizes name, dosage, time, side effects, and number of days to continue medications  Outcome: Progressing Towards Goal  Goal: *Influenza immunization (Oct-Mar only)  Outcome: Progressing Towards Goal  Goal: *Pneumococcal immunization  Outcome: Progressing Towards Goal  Goal: *Lungs clear or at baseline  Outcome: Progressing Towards Goal  Goal: *Oxygen saturation returns to baseline or 90% or better on room air  Outcome: Progressing Towards Goal  Goal: *Glycemic control  Outcome: Progressing Towards Goal  Goal: *Absence of deep venous thrombosis signs and symptoms(Stroke Metric)  Outcome: Progressing Towards Goal  Goal: *Describes available resources and support systems  Outcome: Progressing Towards Goal  Goal: *Optimal pain control at patient's stated goal  Outcome: Progressing Towards Goal

## 2021-12-03 NOTE — DIALYSIS
Hemodialysis / [phone]    Vitals Pre Post Assessment Pre Post   BP BP: 117/65 (12/03/21 1615) 129/69 LOC A&Ox4, cooperative, follows commands A&Ox4, cooperative, follows commands   HR Pulse (Heart Rate): 78 (12/03/21 1615) 77 Lungs Diminished bilaterally  Diminished bilaterally   Resp Resp Rate: 16 (12/03/21 1615) 16 Cardiac NSR, S1, S2 NSR, S1, S2   Temp Temp: 98.4 °F (36.9 °C) (12/03/21 1615) 98.4 Skin LUE AVG  LUE AVG   Weight Pre-Dialysis Weight: 66.7 kg (147 lb) (12/02/21 0743)  Edema 2+ 2+   Tele status NSR NSR Pain Pain Intensity 1: 9 (12/03/21 1615) 0     Orders   Duration: Start: 1550 End: 1920 Total: 3.5 hours   Dialyzer: Dialyzer/Set Up Inspection: Shana Goodwin (12/03/21 1550)   K Bath: Dialysate K (mEq/L): 2 (12/03/21 1550)   Ca Bath: Dialysate CA (mEq/L): 2.5 (12/03/21 1550)   Na: Dialysate NA (mEq/L): 140 (12/03/21 1550)   Bicarb: Dialysate HCO3 (mEq/L): 35 (12/03/21 1550)   Target Fluid Removal: Goal/Amount of Fluid to Remove (mL): 2000 mL (12/03/21 1550)     Access   Type & Location: LUE AVG   Comments:                                     LUE AVG: skin CDI. No s/s of infection. + B/T. No issues with cannulation or hemostasis. Running well at .         Labs   HBsAg (Antigen) / date:         12/1/21 Negative                                       HBsAb (Antibody) / date: 11/11/21 904.40 IMMUNE   Source:    Obtained/Reviewed  Critical Results Called HGB   Date Value Ref Range Status   12/01/2021 9.7 (L) 12.1 - 17.0 g/dL Final     Potassium   Date Value Ref Range Status   12/03/2021 3.8 3.5 - 5.1 mmol/L Final     Calcium   Date Value Ref Range Status   12/03/2021 8.5 8.5 - 10.1 MG/DL Final     BUN   Date Value Ref Range Status   12/03/2021 49 (H) 6 - 20 MG/DL Final     Creatinine   Date Value Ref Range Status   12/03/2021 5.91 (H) 0.70 - 1.30 MG/DL Final        Meds Given   Name Dose Route   None                 Adequacy / Fluid    Total Liters Process: 78.5 L   Net Fluid Removed: 1242 mL Comments   Time Out Done:   (Time) @ 9496   Admitting Diagnosis: Covid 19 infection   Consent obtained/signed: Informed Consent Verified: Yes (12/03/21 1550)   Machine / RO # Machine Number: [id number] (12/03/21 3480)   Primary Nurse Rpt Pre: Olesya Koo RN   Primary Nurse Rpt Post: Olesya Koo RN   Pt Education: Ordered Dialysis Treatment   Care Plan: CKD   Pts outpatient clinic: Home HD. Follows up with Dr Yuniel Griggs at [facility]      Tx Summary   Comments:                        Dialysis RN arrived to patient room, pt A&Ox4. Physical assessment, machine preparation, and safety testing performed per policy. Consent signed & on file. SBAR received from Primary RN. 1550: Pt cannulated with 67Q needles per policy & without issue. Labs drawn per request/ order. VSS. Dialysis Tx initiated. 1700: VSS. Lines patent and intact. No complaints of pain. 1756: 8 beats of VTACH, /62, non-symptomatic, primary RN aware, called on call Geoffrey Frankel NP and reviewed [facility] and treatment progress, pt history, and recent lab values. Telephone orders received to stop UF. UF stopped per telephone order. 1850: VSS. NSR. No complaints of pain. Lines patent and intact. 1921: Tx ended. VSS. All possible blood returned to patient. Hemostasis achieved without issue. Bed locked and in the lowest position, call bell and belongings in reach. SBAR given to Primary, RN. Patient is stable at time of my departure. All Dialysis related medications have been reviewed.

## 2021-12-03 NOTE — PROGRESS NOTES
Physical Therapy    Chart reviewed and attempted to see patient, but he was just starting HD in room. RN reports he has ambulated up to bathroom today and in room with good tolerance. Will continue to follow, patient declined to go to inpatient rehab so recommend HHPT.       Antonio Londono

## 2021-12-04 LAB — LACTATE SERPL-SCNC: 5.3 MMOL/L (ref 0.4–2)

## 2021-12-04 PROCEDURE — 65270000029 HC RM PRIVATE

## 2021-12-04 PROCEDURE — 83605 ASSAY OF LACTIC ACID: CPT

## 2021-12-04 PROCEDURE — 74011250637 HC RX REV CODE- 250/637: Performed by: INTERNAL MEDICINE

## 2021-12-04 PROCEDURE — 36415 COLL VENOUS BLD VENIPUNCTURE: CPT

## 2021-12-04 PROCEDURE — 74011250636 HC RX REV CODE- 250/636: Performed by: INTERNAL MEDICINE

## 2021-12-04 PROCEDURE — 74011250637 HC RX REV CODE- 250/637: Performed by: EMERGENCY MEDICINE

## 2021-12-04 RX ORDER — WARFARIN 2.5 MG/1
2.5 TABLET ORAL ONCE
Status: COMPLETED | OUTPATIENT
Start: 2021-12-04 | End: 2021-12-04

## 2021-12-04 RX ADMIN — CALCIUM ACETATE 1334 MG: 667 CAPSULE ORAL at 13:12

## 2021-12-04 RX ADMIN — HYDROMORPHONE HYDROCHLORIDE 2 MG: 1 INJECTION, SOLUTION INTRAMUSCULAR; INTRAVENOUS; SUBCUTANEOUS at 11:49

## 2021-12-04 RX ADMIN — SUCRALFATE 1 G: 1 TABLET ORAL at 11:48

## 2021-12-04 RX ADMIN — CALCIUM CARBONATE (ANTACID) CHEW TAB 500 MG 200 MG: 500 CHEW TAB at 13:12

## 2021-12-04 RX ADMIN — CALCITRIOL CAPSULES 0.25 MCG 0.5 MCG: 0.25 CAPSULE ORAL at 10:07

## 2021-12-04 RX ADMIN — GUAIFENESIN 600 MG: 600 TABLET, EXTENDED RELEASE ORAL at 10:06

## 2021-12-04 RX ADMIN — Medication 10 ML: at 16:32

## 2021-12-04 RX ADMIN — HYDROMORPHONE HYDROCHLORIDE 2 MG: 1 INJECTION, SOLUTION INTRAMUSCULAR; INTRAVENOUS; SUBCUTANEOUS at 16:30

## 2021-12-04 RX ADMIN — CALCIUM ACETATE 1334 MG: 667 CAPSULE ORAL at 16:30

## 2021-12-04 RX ADMIN — CALCIUM CARBONATE (ANTACID) CHEW TAB 500 MG 200 MG: 500 CHEW TAB at 08:09

## 2021-12-04 RX ADMIN — HYDROMORPHONE HYDROCHLORIDE 2 MG: 1 INJECTION, SOLUTION INTRAMUSCULAR; INTRAVENOUS; SUBCUTANEOUS at 06:17

## 2021-12-04 RX ADMIN — HYDROMORPHONE HYDROCHLORIDE 2 MG: 1 INJECTION, SOLUTION INTRAMUSCULAR; INTRAVENOUS; SUBCUTANEOUS at 02:01

## 2021-12-04 RX ADMIN — Medication 10 ML: at 23:00

## 2021-12-04 RX ADMIN — GUAIFENESIN 600 MG: 600 TABLET, EXTENDED RELEASE ORAL at 20:29

## 2021-12-04 RX ADMIN — CALCIUM ACETATE 1334 MG: 667 CAPSULE ORAL at 08:09

## 2021-12-04 RX ADMIN — WARFARIN SODIUM 2.5 MG: 2.5 TABLET ORAL at 18:41

## 2021-12-04 RX ADMIN — METOPROLOL SUCCINATE 12.5 MG: 25 TABLET, EXTENDED RELEASE ORAL at 10:06

## 2021-12-04 RX ADMIN — ASPIRIN 81 MG CHEWABLE TABLET 81 MG: 81 TABLET CHEWABLE at 10:06

## 2021-12-04 RX ADMIN — Medication 10 ML: at 06:19

## 2021-12-04 RX ADMIN — SUCRALFATE 1 G: 1 TABLET ORAL at 22:59

## 2021-12-04 RX ADMIN — HYDROMORPHONE HYDROCHLORIDE 2 MG: 1 INJECTION, SOLUTION INTRAMUSCULAR; INTRAVENOUS; SUBCUTANEOUS at 20:29

## 2021-12-04 RX ADMIN — PANTOPRAZOLE SODIUM 40 MG: 40 TABLET, DELAYED RELEASE ORAL at 08:09

## 2021-12-04 RX ADMIN — OXYCODONE HYDROCHLORIDE AND ACETAMINOPHEN 500 MG: 500 TABLET ORAL at 10:07

## 2021-12-04 RX ADMIN — CALCIUM CARBONATE (ANTACID) CHEW TAB 500 MG 200 MG: 500 CHEW TAB at 16:30

## 2021-12-04 RX ADMIN — SUCRALFATE 1 G: 1 TABLET ORAL at 16:30

## 2021-12-04 RX ADMIN — ACETAMINOPHEN 650 MG: 325 TABLET ORAL at 23:20

## 2021-12-04 RX ADMIN — Medication 1 CAPSULE: at 10:06

## 2021-12-04 RX ADMIN — SUCRALFATE 1 G: 1 TABLET ORAL at 08:09

## 2021-12-04 RX ADMIN — FAMOTIDINE 20 MG: 20 TABLET, FILM COATED ORAL at 10:06

## 2021-12-04 NOTE — PROGRESS NOTES
End of Shift Note    Bedside shift change report given to Love Hebert RN (oncoming nurse) by Kitty Talley RN (offgoing nurse). Report included the following information SBAR, Kardex, ED Summary, Intake/Output, MAR, Recent Results, Med Rec Status and Cardiac Rhythm Sinus Rhythm    Shift worked:  7p-7a     Shift summary and any significant changes:    had 6 beats of vtach while getting dialysis, mulitple incontinent BM while sleeping, walked to and from the bathroom multiple times with no drop in O2, slept in the chair majority of shift. unable to get AM labs pt is a difficult stick will let dayshift know     Concerns for physician to address:       Zone phone for oncoming shift:          Activity:  Activity Level: Up with Assistance  Number times ambulated in hallways past shift: 0  Number of times OOB to chair past shift: 1    Cardiac:   Cardiac Monitoring: Yes      Cardiac Rhythm: Sinus Rhythm    Access:   Current line(s): PIV and HD access     Genitourinary:   Urinary status: voiding    Respiratory:   O2 Device: None (Room air)  Chronic home O2 use?: NO  Incentive spirometer at bedside: NO     GI:  Last Bowel Movement Date: 12/03/21  Current diet:  ADULT DIET Regular; Low Fat/Low Chol/High Fiber/2 gm Na; Low Potassium (Less than 3000 mg/day); Low Phosphorus (Less than 1000 mg)  DIET ONE TIME MESSAGE  ADULT ORAL NUTRITION SUPPLEMENT Dinner, Lunch; Clear Liquid  ADULT ORAL NUTRITION SUPPLEMENT Breakfast, Lunch; Renal Supplement  Passing flatus: YES  Tolerating current diet: YES       Pain Management:   Patient states pain is manageable on current regimen: YES    Skin:  Yong Score: 19  Interventions: float heels, increase time out of bed and limit briefs    Patient Safety:  Fall Score:  Total Score: 4  Interventions: bed/chair alarm, assistive device (walker, cane, etc), gripper socks, pt to call before getting OOB and stay with me (per policy)  High Fall Risk: Yes    Length of Stay:  Expected LOS: 5d 9h  Actual LOS: Remy Tam, RN

## 2021-12-04 NOTE — PROGRESS NOTES
End of Shift Note    Bedside shift change report given to [address] (oncoming nurse) by Anthony Hall RN (offgoing nurse). Report included the following information SBAR and Procedure Summary    Shift worked:  Days     Shift summary and any significant changes:     Patient received dialysis at end of shift. Dialysis nurse pulled labs. Telemetry notified me that patient had 8 beats of VTach during dialysis. Concerns for physician to address:       Zone phone for oncoming shift:          Activity:  Activity Level: Up with Assistance  Number times ambulated in hallways past shift: 0  Number of times OOB to chair past shift: 1    Cardiac:   Cardiac Monitoring: Yes      Cardiac Rhythm: Sinus Rhythm    Access:   Current line(s): PIV and HD access     Genitourinary:   Urinary status: voiding    Respiratory:   O2 Device: None (Room air)  Chronic home O2 use?: NO  Incentive spirometer at bedside: YES     GI:  Last Bowel Movement Date: 12/01/21  Current diet:  ADULT DIET Regular; Low Fat/Low Chol/High Fiber/2 gm Na; Low Potassium (Less than 3000 mg/day); Low Phosphorus (Less than 1000 mg)  DIET ONE TIME MESSAGE  ADULT ORAL NUTRITION SUPPLEMENT Dinner, Lunch; Clear Liquid  ADULT ORAL NUTRITION SUPPLEMENT Breakfast, Lunch; Renal Supplement  Passing flatus: YES  Tolerating current diet: YES       Pain Management:   Patient states pain is manageable on current regimen: YES    Skin:  Yong Score: 19  Interventions: increase time out of bed    Patient Safety:  Fall Score:  Total Score: 4  Interventions: gripper socks  High Fall Risk: Yes    Length of Stay:  Expected LOS: 5d 9h  Actual LOS: [address], RN

## 2021-12-04 NOTE — PROGRESS NOTES
Pharmacist Daily Dosing of Warfarin    Indication & Goal INR: DVT, INR Goal 2-3    PTA Warfarin Dose: 2.5 mg daily    Notable concurrent conditions and medications: Aspirin    Labs:  Recent Labs     12/03/21  1555 12/02/21  1410 12/02/21  1410 12/02/21  0730   INR 2.0*  --   --  3.8*   TBILI 1.0  --  2.1*  --    ALB 2.7*   < > 3.2*  --     < > = values in this interval not displayed. Impression/Plan:   Warfarin 2.5 mg tonight  Daily INR has been ordered  CBC w/o differential every other day has been ordered     Pharmacy will follow daily and adjust the dose as appropriate.     Thank you,  Letty Nance, PHARMD

## 2021-12-04 NOTE — PROGRESS NOTES
Pharmacist Daily Dosing of Warfarin    Indication & Goal INR: DVT, INR Goal 2-3    PTA Warfarin Dose: 2.5 mg daily    Notable concurrent conditions and medications: Aspirin    Labs:  Recent Labs     12/03/21  1555 12/02/21  1410 12/02/21  1410 12/02/21  0730 12/01/21  0440 12/01/21  0440   INR 2.0*  --   --  3.8*  --  3.0*   HGB  --   --   --   --   --  9.7*   PLT  --   --   --   --   --  88*   TBILI 1.0  --  2.1*  --   --  1.1*   ALB 2.7*   < > 3.2*  --    < > 3.1*    < > = values in this interval not displayed. Impression/Plan:   RN unable to obtain labwork this am due to access issues. Dialysis will hopefully be able to get labs done, unsure when that will be  Based on yesterday's INR, will likely hold, but will hand off to evening pharmacist for follow up. 2.5 mg tonight  Daily INR has been ordered  CBC w/o differential every other day has been ordered     Pharmacy will follow daily and adjust the dose as appropriate.     Thank you,  Debi Huggins, [facility]

## 2021-12-04 NOTE — PROGRESS NOTES
Hospitalist Progress Note    NAME: Yahaira Lewis   :  1975   MRN:  [id number]       Assessment / Plan:  Acute Covid 19 infection syndrome POA  Hypoxemia, resolved  Recent Streptococcus intermedius bacteremia (2021)  -onset of symptoms past Wednesday 3 days PTA, not vaccinated  -initially required only 2L, now off oxygen and >97% on RA  -Rapid Covid test positive  -Blood culture NGTD  -continue to dose IV Vanco prn. Follow random levels. Per last discharge summary, plan is to continue Vanco until Dec 6. Will ask ID for input given no clear explanation for his elevated procalcitonin - still waiting for ID input, will send Dr WALTON a text reminder  -Procalcitonin 26 on admission, repeat is 7.9.    -continue decadron. Follow CRP and ferritin - trending down  -cefepime stopped (received -)  -current plan is to repeat CRP, ferritin and PCT on Monday and if continues to trend down, will discharge patient after dialysis and after his last dose of vancomycin.    -should keep appointment with Dr Rusty Torres, oral surgery, on 21    Abdominal pain  Transaminitis  Elevated lipase  Heartburn   -CT abdomen:  Nothing acute  -Abd US Uniform liver. Absent gallbladder without biliary dilation. Small quantity ascites  -acute hepatitis panel NEG  -LFTs are trending down. 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 Fentanyl not helping either. After some discussion, will allow dilaudid    GERD  -PPI.   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 Estimated discharge date: Nov 30, 2021  Barriers: COVID-19, Improvement of transaminitis         Subjective:     Chief Complaint / Reason for Physician Visit  Follow up elevated LFTs, elevated lipase, COVID, possible PNA vs CHF    Review of Systems:  Symptom Y/N Comments  Symptom Y/N Comments   Fever/Chills    Chest Pain     Poor Appetite    Edema     Cough    Abdominal Pain     Sputum    Joint Pain     SOB/ZAMUDIO    Pruritis/Rash     Nausea/vomit    Tolerating PT/OT     Diarrhea    Tolerating Diet     Constipation    Other       Could NOT obtain due to:      Objective:     VITALS:   Last 24hrs VS reviewed since prior progress note.  Most recent are:  Patient Vitals for the past 24 hrs:   Temp Pulse Resp BP SpO2   12/04/21 1144 98.4 °F (36.9 °C) 84 16 105/70    12/04/21 0801 98.4 °F (36.9 °C) 76 16 112/67 100 %   12/04/21 0247 98.2 °F (36.8 °C) 78 16 112/67 97 %   12/03/21 2343 97.6 °F (36.4 °C) 83 16 133/81 96 %   12/03/21 1930 98.4 °F (36.9 °C) 77 16 129/69 96 %   12/03/21 1915  76 16 131/67    12/03/21 1900  72 16 118/67    12/03/21 1845  75 16 128/70    12/03/21 1830  78 16 131/67    12/03/21 1815  82 16 119/66    12/03/21 1800  98 16 103/62    12/03/21 1745  70 16 (!) 103/59    12/03/21 1730  72 16 104/65    12/03/21 1715  71 16 104/62    12/03/21 1700  78 16 105/63    12/03/21 1645  81 16 110/71    12/03/21 1630  81 16 111/69 96 %   12/03/21 1615 98.4 °F (36.9 °C) 78 16 117/65 96 %   12/03/21 1600  78 18 114/74    12/03/21 1550 97.7 °F (36.5 °C) 82 18 116/67        Intake/Output Summary (Last 24 hours) at 12/4/2021 1453  Last data filed at 12/4/2021 0346  Gross per 24 hour   Intake 480 ml   Output 1242 ml   Net -762 ml        I had a face to face encounter and independently examined this patient on 12/4/2021, as outlined below:  PHYSICAL EXAM:    General:          Alert, cooperative, no distress, appears stated age. HEENT:           Atraumatic, anicteric sclerae, pink conjunctivae                          No oral ulcers, mucosa moist, throat clear, dentition fair  Neck:               Supple, symmetrical,  thyroid: non tender  Lungs:             Clear to auscultation bilaterally. No Wheezing or Rhonchi. No rales. Chest wall:      No tenderness  No Accessory muscle use. Left chest wall AICD noted  Heart:              Regular  rhythm,  No  murmur   2+ LE edema bilaterally  Abdomen:        Soft, non-tender. Not distended. Bowel sounds normal  Extremities:     No cyanosis. No clubbing, left arm AV fistula noted with good thrill                          Skin turgor normal, Capillary refill normal, Radial dial pulse 2+  Skin:                Not pale. Not Jaundiced  No rashes   Psych:             Good insight. Not depressed. Not anxious or agitated. Neurologic:      EOMs intact. No facial asymmetry. No aphasia or slurred speech. Symmetrical strength, Sensation grossly intact. Alert and oriented X 4.      Reviewed most current lab test results and cultures  YES  Reviewed most current radiology test results   YES  Review and summation of old records today    NO  Reviewed patient's current orders and MAR    YES  PMH/ reviewed - no change compared to H&P  ________________________________________________________________________  Care Plan discussed with:    Comments   Patient     Family      RN     Care Manager     Consultant                        Multidiciplinary team rounds were held today with , nursing, pharmacist and clinical coordinator. Patient's plan of care was discussed; medications were reviewed and discharge planning was addressed. ________________________________________________________________________  Total NON critical care TIME:  31   Minutes    Total CRITICAL CARE TIME Spent:   Minutes non procedure based      Comments   >50% of visit spent in counseling and coordination of care     ________________________________________________________________________  Sheng Aranda MD     Procedures: see electronic medical records for all procedures/Xrays and details which were not copied into this note but were reviewed prior to creation of Plan. LABS:  I reviewed today's most current labs and imaging studies. Pertinent labs include:  No results for input(s): WBC, HGB, HCT, PLT, HGBEXT, HCTEXT, PLTEXT, HGBEXT, HCTEXT, PLTEXT in the last 72 hours.   Recent Labs     12/03/21  1555 12/02/21  1410 12/02/21  0730   * 134*  --    K 3.8 3.9  --     97  --    CO2 26 31  --    * 118*  --    BUN 49* 39*  --    CREA 5.91* 5.05*  --    CA 8.5 8.6  --    MG 2.5*  --   --    ALB 2.7* 3.2*  --    TBILI 1.0 2.1*  --    * 426*  --    INR 2.0*  --  3.8*       Signed: Sheng Aranda MD

## 2021-12-04 NOTE — PROGRESS NOTES
Problem: Airway Clearance - Ineffective  Goal: Achieve or maintain patent airway  Outcome: Progressing Towards Goal     Problem: Gas Exchange - Impaired  Goal: Absence of hypoxia  Outcome: Progressing Towards Goal  Goal: Promote optimal lung function  Outcome: Progressing Towards Goal     Problem: Breathing Pattern - Ineffective  Goal: Ability to achieve and maintain a regular respiratory rate  Outcome: Progressing Towards Goal     Problem: Body Temperature -  Risk of, Imbalanced  Goal: Ability to maintain a body temperature within defined limits  Outcome: Progressing Towards Goal  Goal: Will regain or maintain usual level of consciousness  Outcome: Progressing Towards Goal  Goal: Complications related to the disease process, condition or treatment will be avoided or minimized  Outcome: Progressing Towards Goal     Problem: Isolation Precautions - Risk of Spread of Infection  Goal: Prevent transmission of infectious organism to others  Outcome: Progressing Towards Goal     Problem: Nutrition Deficits  Goal: Optimize nutrtional status  Outcome: Progressing Towards Goal     Problem: Risk for Fluid Volume Deficit  Goal: Maintain normal heart rhythm  Outcome: Progressing Towards Goal  Goal: Maintain absence of muscle cramping  Outcome: Progressing Towards Goal  Goal: Maintain normal serum potassium, sodium, calcium, phosphorus, and pH  Outcome: Progressing Towards Goal     Problem: Loneliness or Risk for Loneliness  Goal: Demonstrate positive use of time alone when socialization is not possible  Outcome: Progressing Towards Goal     Problem: Fatigue  Goal: Verbalize increase energy and improved vitality  Outcome: Progressing Towards Goal     Problem: Patient Education: Go to Patient Education Activity  Goal: Patient/Family Education  Outcome: Progressing Towards Goal     Problem: Falls - Risk of  Goal: *Absence of Falls  Description: Document [place] Fall Risk and appropriate interventions in the flowsheet.   Outcome: Progressing Towards Goal  Note: Fall Risk Interventions:  Mobility Interventions: Bed/chair exit alarm, Communicate number of staff needed for ambulation/transfer, Patient to call before getting OOB    Mentation Interventions: Adequate sleep, hydration, pain control, Bed/chair exit alarm, Door open when patient unattended, Increase mobility, More frequent rounding, Reorient patient, Room close to nurse's station, Toileting rounds, Update white board    Medication Interventions: Bed/chair exit alarm, Evaluate medications/consider consulting pharmacy, Teach patient to arise slowly    Elimination Interventions: Bed/chair exit alarm, Call light in reach, Stay With Me (per policy), Toilet paper/wipes in reach, Toileting schedule/hourly rounds, Urinal in reach, Patient to call for help with toileting needs    History of Falls Interventions: Bed/chair exit alarm, Investigate reason for fall, Room close to nurse's station         Problem: Patient Education: Go to Patient Education Activity  Goal: Patient/Family Education  Outcome: Progressing Towards Goal     Problem: Patient Education: Go to Patient Education Activity  Goal: Patient/Family Education  Outcome: Progressing Towards Goal     Problem: Patient Education: Go to Patient Education Activity  Goal: Patient/Family Education  Outcome: Progressing Towards Goal     Problem: Patient Education: Go to Patient Education Activity  Goal: Patient/Family Education  Outcome: Progressing Towards Goal     Problem: Sepsis: Day of Diagnosis  Goal: Off Pathway (Use only if patient is Off Pathway)  Outcome: Progressing Towards Goal  Goal: *Fluid resuscitation  Outcome: Progressing Towards Goal  Goal: *Paired blood cultures prior to first dose of antibiotic  Outcome: Progressing Towards Goal  Goal: *First dose of  appropriate antibiotic within 3 hours of arrival to ED, within 1 hour of arrival to ICU  Outcome: Progressing Towards Goal  Goal: *Lactic acid with first set of blood cultures  Outcome: Progressing Towards Goal  Goal: *Pneumococcal immunization (if eligible)  Outcome: Progressing Towards Goal  Goal: *Influenza immunization (if eligible)  Outcome: Progressing Towards Goal  Goal: Activity/Safety  Outcome: Progressing Towards Goal  Goal: Consults, if ordered  Outcome: Progressing Towards Goal  Goal: Diagnostic Test/Procedures  Outcome: Progressing Towards Goal  Goal: Nutrition/Diet  Outcome: Progressing Towards Goal  Goal: Discharge Planning  Outcome: Progressing Towards Goal  Goal: Medications  Outcome: Progressing Towards Goal  Goal: Respiratory  Outcome: Progressing Towards Goal  Goal: Treatments/Interventions/Procedures  Outcome: Progressing Towards Goal  Goal: Psychosocial  Outcome: Progressing Towards Goal     Problem: Sepsis: Day 2  Goal: Off Pathway (Use only if patient is Off Pathway)  Outcome: Progressing Towards Goal  Goal: *Central Venous Pressure maintained at 8-12 mm Hg  Outcome: Progressing Towards Goal  Goal: *Hemodynamically stable  Outcome: Progressing Towards Goal  Goal: *Tolerating diet  Outcome: Progressing Towards Goal  Goal: Activity/Safety  Outcome: Progressing Towards Goal  Goal: Consults, if ordered  Outcome: Progressing Towards Goal  Goal: Diagnostic Test/Procedures  Outcome: Progressing Towards Goal  Goal: Nutrition/Diet  Outcome: Progressing Towards Goal  Goal: Discharge Planning  Outcome: Progressing Towards Goal  Goal: Medications  Outcome: Progressing Towards Goal  Goal: Respiratory  Outcome: Progressing Towards Goal  Goal: Treatments/Interventions/Procedures  Outcome: Progressing Towards Goal  Goal: Psychosocial  Outcome: Progressing Towards Goal     Problem: Sepsis: Day 3  Goal: Off Pathway (Use only if patient is Off Pathway)  Outcome: Progressing Towards Goal  Goal: *Central Venous Pressure maintained at 8-12 mm Hg  Outcome: Progressing Towards Goal  Goal: *Oxygen saturation within defined limits  Outcome: Progressing Towards Goal  Goal: *Vital sign stability  Outcome: Progressing Towards Goal  Goal: *Tolerating diet  Outcome: Progressing Towards Goal  Goal: *Demonstrates progressive activity  Outcome: Progressing Towards Goal  Goal: Activity/Safety  Outcome: Progressing Towards Goal  Goal: Consults, if ordered  Outcome: Progressing Towards Goal  Goal: Diagnostic Test/Procedures  Outcome: Progressing Towards Goal  Goal: Nutrition/Diet  Outcome: Progressing Towards Goal  Goal: Discharge Planning  Outcome: Progressing Towards Goal  Goal: Medications  Outcome: Progressing Towards Goal  Goal: Respiratory  Outcome: Progressing Towards Goal  Goal: Treatments/Interventions/Procedures  Outcome: Progressing Towards Goal  Goal: Psychosocial  Outcome: Progressing Towards Goal     Problem: Sepsis: Day 4  Goal: Off Pathway (Use only if patient is Off Pathway)  Outcome: Progressing Towards Goal  Goal: Activity/Safety  Outcome: Progressing Towards Goal  Goal: Consults, if ordered  Outcome: Progressing Towards Goal  Goal: Diagnostic Test/Procedures  Outcome: Progressing Towards Goal  Goal: Nutrition/Diet  Outcome: Progressing Towards Goal  Goal: Discharge Planning  Outcome: Progressing Towards Goal  Goal: Medications  Outcome: Progressing Towards Goal  Goal: Respiratory  Outcome: Progressing Towards Goal  Goal: Treatments/Interventions/Procedures  Outcome: Progressing Towards Goal  Goal: Psychosocial  Outcome: Progressing Towards Goal  Goal: *Oxygen saturation within defined limits  Outcome: Progressing Towards Goal  Goal: *Hemodynamically stable  Outcome: Progressing Towards Goal  Goal: *Vital signs within defined limits  Outcome: Progressing Towards Goal  Goal: *Tolerating diet  Outcome: Progressing Towards Goal  Goal: *Demonstrates progressive activity  Outcome: Progressing Towards Goal  Goal: *Fluid volume maintenance  Outcome: Progressing Towards Goal     Problem: Sepsis: Day 5  Goal: Off Pathway (Use only if patient is Off Pathway)  Outcome: Progressing Towards Goal  Goal: *Oxygen saturation within defined limits  Outcome: Progressing Towards Goal  Goal: *Vital signs within defined limits  Outcome: Progressing Towards Goal  Goal: *Tolerating diet  Outcome: Progressing Towards Goal  Goal: *Demonstrates progressive activity  Outcome: Progressing Towards Goal  Goal: *Discharge plan identified  Outcome: Progressing Towards Goal  Goal: Activity/Safety  Outcome: Progressing Towards Goal  Goal: Consults, if ordered  Outcome: Progressing Towards Goal  Goal: Diagnostic Test/Procedures  Outcome: Progressing Towards Goal  Goal: Nutrition/Diet  Outcome: Progressing Towards Goal  Goal: Discharge Planning  Outcome: Progressing Towards Goal  Goal: Medications  Outcome: Progressing Towards Goal  Goal: Respiratory  Outcome: Progressing Towards Goal  Goal: Treatments/Interventions/Procedures  Outcome: Progressing Towards Goal  Goal: Psychosocial  Outcome: Progressing Towards Goal     Problem: Sepsis: Day 6  Goal: Off Pathway (Use only if patient is Off Pathway)  Outcome: Progressing Towards Goal  Goal: *Oxygen saturation within defined limits  Outcome: Progressing Towards Goal  Goal: *Vital signs within defined limits  Outcome: Progressing Towards Goal  Goal: *Tolerating diet  Outcome: Progressing Towards Goal  Goal: *Demonstrates progressive activity  Outcome: Progressing Towards Goal  Goal: Influenza immunization  Outcome: Progressing Towards Goal  Goal: *Pneumococcal immunization  Outcome: Progressing Towards Goal  Goal: Activity/Safety  Outcome: Progressing Towards Goal  Goal: Diagnostic Test/Procedures  Outcome: Progressing Towards Goal  Goal: Nutrition/Diet  Outcome: Progressing Towards Goal  Goal: Discharge Planning  Outcome: Progressing Towards Goal  Goal: Medications  Outcome: Progressing Towards Goal  Goal: Respiratory  Outcome: Progressing Towards Goal  Goal: Treatments/Interventions/Procedures  Outcome: Progressing Towards Goal  Goal: Psychosocial  Outcome: Progressing Towards Goal     Problem: Sepsis: Discharge Outcomes  Goal: *Vital signs within defined limits  Outcome: Progressing Towards Goal  Goal: *Tolerating diet  Outcome: Progressing Towards Goal  Goal: *Verbalizes understanding and describes prescribed diet  Outcome: Progressing Towards Goal  Goal: *Demonstrates progressive activity  Outcome: Progressing Towards Goal  Goal: *Describes follow-up/return visits to physicians  Outcome: Progressing Towards Goal  Goal: *Verbalizes name, dosage, time, side effects, and number of days to continue medications  Outcome: Progressing Towards Goal  Goal: *Influenza immunization (Oct-Mar only)  Outcome: Progressing Towards Goal  Goal: *Pneumococcal immunization  Outcome: Progressing Towards Goal  Goal: *Lungs clear or at baseline  Outcome: Progressing Towards Goal  Goal: *Oxygen saturation returns to baseline or 90% or better on room air  Outcome: Progressing Towards Goal  Goal: *Glycemic control  Outcome: Progressing Towards Goal  Goal: *Absence of deep venous thrombosis signs and symptoms(Stroke Metric)  Outcome: Progressing Towards Goal  Goal: *Describes available resources and support systems  Outcome: Progressing Towards Goal  Goal: *Optimal pain control at patient's stated goal  Outcome: Progressing Towards Goal

## 2021-12-05 LAB
D DIMER PPP FEU-MCNC: 17.87 MG/L FEU (ref 0–0.65)
INR PPP: 1.4 (ref 0.9–1.1)
LACTATE SERPL-SCNC: 1.4 MMOL/L (ref 0.4–2)
PROTHROMBIN TIME: 14.8 SEC (ref 9–11.1)

## 2021-12-05 PROCEDURE — 83605 ASSAY OF LACTIC ACID: CPT

## 2021-12-05 PROCEDURE — 85610 PROTHROMBIN TIME: CPT

## 2021-12-05 PROCEDURE — 74011250637 HC RX REV CODE- 250/637: Performed by: INTERNAL MEDICINE

## 2021-12-05 PROCEDURE — 85379 FIBRIN DEGRADATION QUANT: CPT

## 2021-12-05 PROCEDURE — 36415 COLL VENOUS BLD VENIPUNCTURE: CPT

## 2021-12-05 PROCEDURE — 74011250636 HC RX REV CODE- 250/636: Performed by: INTERNAL MEDICINE

## 2021-12-05 PROCEDURE — 65270000029 HC RM PRIVATE

## 2021-12-05 RX ADMIN — FAMOTIDINE 20 MG: 20 TABLET, FILM COATED ORAL at 09:09

## 2021-12-05 RX ADMIN — SUCRALFATE 1 G: 1 TABLET ORAL at 12:56

## 2021-12-05 RX ADMIN — Medication 10 ML: at 04:32

## 2021-12-05 RX ADMIN — CALCITRIOL CAPSULES 0.25 MCG 0.5 MCG: 0.25 CAPSULE ORAL at 09:08

## 2021-12-05 RX ADMIN — Medication 10 ML: at 22:15

## 2021-12-05 RX ADMIN — CALCIUM CARBONATE (ANTACID) CHEW TAB 500 MG 200 MG: 500 CHEW TAB at 12:57

## 2021-12-05 RX ADMIN — METOPROLOL SUCCINATE 12.5 MG: 25 TABLET, EXTENDED RELEASE ORAL at 09:09

## 2021-12-05 RX ADMIN — HYDROMORPHONE HYDROCHLORIDE 2 MG: 1 INJECTION, SOLUTION INTRAMUSCULAR; INTRAVENOUS; SUBCUTANEOUS at 12:56

## 2021-12-05 RX ADMIN — GUAIFENESIN 600 MG: 600 TABLET, EXTENDED RELEASE ORAL at 09:09

## 2021-12-05 RX ADMIN — SUCRALFATE 1 G: 1 TABLET ORAL at 09:10

## 2021-12-05 RX ADMIN — OXYCODONE HYDROCHLORIDE AND ACETAMINOPHEN 500 MG: 500 TABLET ORAL at 09:09

## 2021-12-05 RX ADMIN — WARFARIN SODIUM 3 MG: 2 TABLET ORAL at 17:39

## 2021-12-05 RX ADMIN — HYDROMORPHONE HYDROCHLORIDE 2 MG: 1 INJECTION, SOLUTION INTRAMUSCULAR; INTRAVENOUS; SUBCUTANEOUS at 04:32

## 2021-12-05 RX ADMIN — PANTOPRAZOLE SODIUM 40 MG: 40 TABLET, DELAYED RELEASE ORAL at 09:09

## 2021-12-05 RX ADMIN — HYDROMORPHONE HYDROCHLORIDE 2 MG: 1 INJECTION, SOLUTION INTRAMUSCULAR; INTRAVENOUS; SUBCUTANEOUS at 22:15

## 2021-12-05 RX ADMIN — CALCIUM ACETATE 1334 MG: 667 CAPSULE ORAL at 12:57

## 2021-12-05 RX ADMIN — ASPIRIN 81 MG CHEWABLE TABLET 81 MG: 81 TABLET CHEWABLE at 09:09

## 2021-12-05 RX ADMIN — HYDROMORPHONE HYDROCHLORIDE 2 MG: 1 INJECTION, SOLUTION INTRAMUSCULAR; INTRAVENOUS; SUBCUTANEOUS at 00:19

## 2021-12-05 RX ADMIN — SUCRALFATE 1 G: 1 TABLET ORAL at 17:39

## 2021-12-05 RX ADMIN — Medication 1 CAPSULE: at 09:09

## 2021-12-05 RX ADMIN — CALCIUM CARBONATE (ANTACID) CHEW TAB 500 MG 200 MG: 500 CHEW TAB at 17:39

## 2021-12-05 RX ADMIN — CALCIUM ACETATE 1334 MG: 667 CAPSULE ORAL at 17:39

## 2021-12-05 RX ADMIN — CALCIUM CARBONATE (ANTACID) CHEW TAB 500 MG 200 MG: 500 CHEW TAB at 09:09

## 2021-12-05 RX ADMIN — CALCIUM ACETATE 1334 MG: 667 CAPSULE ORAL at 09:09

## 2021-12-05 RX ADMIN — Medication 10 ML: at 17:38

## 2021-12-05 RX ADMIN — HYDROMORPHONE HYDROCHLORIDE 2 MG: 1 INJECTION, SOLUTION INTRAMUSCULAR; INTRAVENOUS; SUBCUTANEOUS at 17:39

## 2021-12-05 RX ADMIN — HYDROMORPHONE HYDROCHLORIDE 2 MG: 1 INJECTION, SOLUTION INTRAMUSCULAR; INTRAVENOUS; SUBCUTANEOUS at 09:05

## 2021-12-05 NOTE — PROGRESS NOTES
Hospitalist Progress Note    NAME: Shae Valentino   :  1975   MRN:  [id number]       Assessment / Plan:  Acute Covid 19 infection syndrome POA  Hypoxemia, resolved  Recent Streptococcus intermedius bacteremia (2021)  -onset of symptoms past Wednesday 3 days PTA, not vaccinated  -initially required only 2L, now off oxygen and >97% on RA  -Rapid Covid test positive  -Blood culture NGTD  -continue to dose IV Vanco prn. Follow random levels. Per last discharge summary, plan is to continue Vanco until Dec 6. Will ask ID for input given no clear explanation for his elevated procalcitonin - still waiting for ID input, will send Dr WALTON a text reminder  -Procalcitonin 26 on admission, repeat is 7.9.    -continue decadron. Follow CRP and ferritin - trending down  -cefepime stopped (received -)  -current plan is to repeat CRP, ferritin and PCT on Monday and if continues to trend down, will discharge patient after dialysis and after his last dose of vancomycin.    -should keep appointment with Dr Tiffany Beltran, oral surgery, on 21    Abdominal pain  Transaminitis  Elevated lipase  Heartburn   -CT abdomen:  Nothing acute  -Abd US Uniform liver. Absent gallbladder without biliary dilation. Small quantity ascites  -acute hepatitis panel NEG  -LFTs are trending down. 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 Fentanyl not helping either. After some discussion, will allow dilaudid while here    GERD  -PPI.   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 Estimated discharge date: Nov 30, 2021  Barriers: COVID-19, Improvement of transaminitis         Subjective:     Chief Complaint / Reason for Physician Visit  Follow up elevated LFTs, elevated lipase, COVID, possible PNA vs CHF  No new issues    Review of Systems:  Symptom Y/N Comments  Symptom Y/N Comments   Fever/Chills    Chest Pain     Poor Appetite    Edema     Cough    Abdominal Pain     Sputum    Joint Pain     SOB/ZAMUDIO    Pruritis/Rash     Nausea/vomit    Tolerating PT/OT     Diarrhea    Tolerating Diet     Constipation    Other       Could NOT obtain due to:      Objective:     VITALS:   Last 24hrs VS reviewed since prior progress note. Most recent are:  Patient Vitals for the past 24 hrs:   Temp Pulse Resp BP SpO2   12/05/21 1330 98.9 °F (37.2 °C) 90 18 110/69 97 %   12/05/21 0730 99 °F (37.2 °C) 83 18 111/63 100 %   12/05/21 0328 99.3 °F (37.4 °C) 96 20 (!) 118/59 97 %   12/05/21 0030 100.2 °F (37.9 °C)       12/04/21 2256 (!) 101.1 °F (38.4 °C) 100 18 121/70 100 %   12/04/21 1956 99.2 °F (37.3 °C) 92 18 124/69 100 %   12/04/21 1639 98.4 °F (36.9 °C) 85 16 112/65        Intake/Output Summary (Last 24 hours) at 12/5/2021 1415  Last data filed at 12/5/2021 1342  Gross per 24 hour   Intake 720 ml   Output    Net 720 ml        I had a face to face encounter and independently examined this patient on 12/5/2021, as outlined below:  PHYSICAL EXAM:    General:          Alert, cooperative, no distress, appears stated age.      HEENT: Atraumatic, anicteric sclerae, pink conjunctivae                          No oral ulcers, mucosa moist, throat clear, dentition fair  Neck:               Supple, symmetrical,  thyroid: non tender  Lungs:             Clear to auscultation bilaterally. No Wheezing or Rhonchi. No rales. Chest wall:      No tenderness  No Accessory muscle use. Left chest wall AICD noted  Heart:              Regular  rhythm,  No  murmur   2+ LE edema bilaterally  Abdomen:        Soft, non-tender. Not distended. Bowel sounds normal  Extremities:     No cyanosis. No clubbing, left arm AV fistula noted with good thrill                          Skin turgor normal, Capillary refill normal, Radial dial pulse 2+  Skin:                Not pale. Not Jaundiced  No rashes   Psych:             Good insight. Not depressed. Not anxious or agitated. Neurologic:      EOMs intact. No facial asymmetry. No aphasia or slurred speech. Symmetrical strength, Sensation grossly intact. Alert and oriented X 4. Reviewed most current lab test results and cultures  YES  Reviewed most current radiology test results   YES  Review and summation of old records today    NO  Reviewed patient's current orders and MAR    YES  PMH/ reviewed - no change compared to H&P  ________________________________________________________________________  Care Plan discussed with:    Comments   Patient     Family      RN     Care Manager     Consultant                        Multidiciplinary team rounds were held today with , nursing, pharmacist and clinical coordinator. Patient's plan of care was discussed; medications were reviewed and discharge planning was addressed.      ________________________________________________________________________  Total NON critical care TIME:  31   Minutes    Total CRITICAL CARE TIME Spent:   Minutes non procedure based      Comments   >50% of visit spent in counseling and coordination of care ________________________________________________________________________  Jose Alberto Bonner MD     Procedures: see electronic medical records for all procedures/Xrays and details which were not copied into this note but were reviewed prior to creation of Plan. LABS:  I reviewed today's most current labs and imaging studies. Pertinent labs include:  No results for input(s): WBC, HGB, HCT, PLT, HGBEXT, HCTEXT, PLTEXT, HGBEXT, HCTEXT, PLTEXT in the last 72 hours.   Recent Labs     12/05/21  0831 12/03/21  1555   NA  --  134*   K  --  3.8   CL  --  100   CO2  --  26   GLU  --  172*   BUN  --  49*   CREA  --  5.91*   CA  --  8.5   MG  --  2.5*   ALB  --  2.7*   TBILI  --  1.0   ALT  --  294*   INR 1.4* 2.0*       Signed: Jose Alberto Bonner MD

## 2021-12-05 NOTE — PROGRESS NOTES
End of Shift Note    Bedside shift change report given to Jovita BILLINGS   (oncoming nurse) by Deepti Noel (offgoing nurse). Report included the following information SBAR and Kardex    Shift worked:  [id number]     Shift summary and any significant changes:     Unable to get scheduled LA and PTT/INR as pt a hard stick and refused further attempts. NP Purveyor notified. PICC team got his PTT/INR during the day which hemolyzed. Pt having generalized pain that requires PRN dilaudid every four hours. Concerns for physician to address:    Zone phone for oncoming shift:          Activity:  Activity Level: Up with Assistance  Number times ambulated in hallways past shift: 0  Number of times OOB to chair past shift: 0    Cardiac:   Cardiac Monitoring: Yes      Cardiac Rhythm: Sinus Rhythm    Access:   Current line(s): PIV     Genitourinary:   Urinary status: anuria      Respiratory:   O2 Device: None (Room air)  Chronic home O2 use?: YES  Incentive spirometer at bedside: YES     GI:  Last Bowel Movement Date: 12/04/21  Current diet:  ADULT DIET Regular; Low Fat/Low Chol/High Fiber/2 gm Na; Low Potassium (Less than 3000 mg/day); Low Phosphorus (Less than 1000 mg)  DIET ONE TIME MESSAGE  ADULT ORAL NUTRITION SUPPLEMENT Dinner, Lunch; Clear Liquid  ADULT ORAL NUTRITION SUPPLEMENT Breakfast, Lunch; Renal Supplement  Passing flatus: YES  Tolerating current diet: YES       Pain Management:   Patient states pain is manageable on current regimen: NO    Skin:  Yong Score: 17  Interventions: increase time out of bed    Patient Safety:  Fall Score:  Total Score: 4  Interventions: gripper socks  High Fall Risk: Yes    Length of Stay:  Expected LOS: 5d 9h  Actual LOS: [address]

## 2021-12-05 NOTE — PROGRESS NOTES
0700: Bedside shift change report given to MANUELITO Hussein (oncoming nurse) by Aury Alvarez RN (offgoing nurse). Report included the following information SBAR, Kardex, ED Summary and Intake/Output. Night shift nurse made this RN aware she was unable to get pt's labs after looking for over an hour and that pt refused any further attempts to stick him. Pt is a very difficult stick, he has an active fistula in his L arm and an old fistula in the R arm. Only really able to work with half of a limb. When pt was in the ED 3 ultrasound techs tried to get an ultrasound IV access and were unsuccessful. Will contact MD and ask if maybe we can get a central line placed. Pt states, we are \"torturing\" him.     0803: Contacted ED Charge Nurse, Tami Brown, She said she will send someone up to attempt ultrasound guided lab draw. 3662: ED ultrasound tech was able to place a PIV and draw labs off of it. Made MD aware. MD also changed labs to be only drawn with dialysis and D/C'd some labs. Lactic went down to 1.4.     1900:   End of Shift Note    Bedside shift change report given to Aury Alvarez RN (oncoming nurse) by Vandana Rouse RN (offgoing nurse). Report included the following information SBAR, Kardex, ED Summary and Intake/Output    Shift worked:  Day     Shift summary and any significant changes:     See above notes     Concerns for physician to address:       Zone phone for oncoming shift:          Activity:  Activity Level:  Up with Assistance, Up ad fouzia  Number times ambulated in hallways past shift: 0  Number of times OOB to chair past shift: 2    Cardiac:   Cardiac Monitoring: Yes      Cardiac Rhythm: Sinus Rhythm    Access:   Current line(s): PIV and HD access     Genitourinary:   Urinary status: anuric    Respiratory:   O2 Device: None (Room air)  Chronic home O2 use?: N/A  Incentive spirometer at bedside: N/A     GI:  Last Bowel Movement Date: 12/04/21  Current diet:  ADULT DIET Regular; Low Fat/Low Chol/High Fiber/2 gm Na; Low Potassium (Less than 3000 mg/day); Low Phosphorus (Less than 1000 mg)  DIET ONE TIME MESSAGE  ADULT ORAL NUTRITION SUPPLEMENT Dinner, Lunch; Clear Liquid  ADULT ORAL NUTRITION SUPPLEMENT Breakfast, Lunch; Renal Supplement  Passing flatus: YES  Tolerating current diet: YES       Pain Management:   Patient states pain is manageable on current regimen: NO    Skin:  Yong Score: 17  Interventions: increase time out of bed and PT/OT consult    Patient Safety:  Fall Score:  Total Score: 4  Interventions: bed/chair alarm, assistive device (walker, cane, etc), gripper socks and pt to call before getting OOB  High Fall Risk: Yes    Length of Stay:  Expected LOS: 5d 9h  Actual LOS: [address], RN

## 2021-12-05 NOTE — PROGRESS NOTES
End of Shift Note    Bedside shift change report given to Patrick Cortez RN (oncoming nurse) by Kelly Lezama RN (offgoing nurse). Report included the following information SBAR    Shift worked:  Days     Shift summary and any significant changes:     Patient up to bathroom. Ambulating well. Labs drawn by Iris from rapid team.  PT/INR hemolyzed. ER can do POC test.     Concerns for physician to address:       Zone phone for oncoming shift:          Activity:  Activity Level: Up with Assistance  Number times ambulated in hallways past shift: 0  Number of times OOB to chair past shift: 1    Cardiac:   Cardiac Monitoring: Yes      Cardiac Rhythm: Sinus Rhythm    Access:   Current line(s): PIV and HD access     Genitourinary:   Urinary status: voiding    Respiratory:   O2 Device: None (Room air)  Chronic home O2 use?: NO  Incentive spirometer at bedside: YES     GI:  Last Bowel Movement Date: 12/04/21  Current diet:  ADULT DIET Regular; Low Fat/Low Chol/High Fiber/2 gm Na; Low Potassium (Less than 3000 mg/day); Low Phosphorus (Less than 1000 mg)  DIET ONE TIME MESSAGE  ADULT ORAL NUTRITION SUPPLEMENT Dinner, Lunch; Clear Liquid  ADULT ORAL NUTRITION SUPPLEMENT Breakfast, Lunch; Renal Supplement  Passing flatus: YES  Tolerating current diet: YES       Pain Management:   Patient states pain is manageable on current regimen: YES    Skin:  Yong Score: 17  Interventions: increase time out of bed    Patient Safety:  Fall Score:  Total Score: 4  Interventions: gripper socks  High Fall Risk: Yes    Length of Stay:  Expected LOS: 5d 9h  Actual LOS: Sourav Sawyer RN

## 2021-12-05 NOTE — PROGRESS NOTES
Problem: Airway Clearance - Ineffective  Goal: Achieve or maintain patent airway  Outcome: Progressing Towards Goal     Problem: Gas Exchange - Impaired  Goal: Absence of hypoxia  Outcome: Progressing Towards Goal  Goal: Promote optimal lung function  Outcome: Progressing Towards Goal     Problem: Breathing Pattern - Ineffective  Goal: Ability to achieve and maintain a regular respiratory rate  Outcome: Progressing Towards Goal     Problem: Body Temperature -  Risk of, Imbalanced  Goal: Ability to maintain a body temperature within defined limits  Outcome: Progressing Towards Goal  Goal: Will regain or maintain usual level of consciousness  Outcome: Progressing Towards Goal  Goal: Complications related to the disease process, condition or treatment will be avoided or minimized  Outcome: Progressing Towards Goal     Problem: Isolation Precautions - Risk of Spread of Infection  Goal: Prevent transmission of infectious organism to others  Outcome: Progressing Towards Goal     Problem: Nutrition Deficits  Goal: Optimize nutrtional status  Outcome: Progressing Towards Goal     Problem: Risk for Fluid Volume Deficit  Goal: Maintain normal heart rhythm  Outcome: Progressing Towards Goal  Goal: Maintain absence of muscle cramping  Outcome: Progressing Towards Goal  Goal: Maintain normal serum potassium, sodium, calcium, phosphorus, and pH  Outcome: Progressing Towards Goal     Problem: Loneliness or Risk for Loneliness  Goal: Demonstrate positive use of time alone when socialization is not possible  Outcome: Progressing Towards Goal     Problem: Fatigue  Goal: Verbalize increase energy and improved vitality  Outcome: Progressing Towards Goal     Problem: Patient Education: Go to Patient Education Activity  Goal: Patient/Family Education  Outcome: Progressing Towards Goal     Problem: Falls - Risk of  Goal: *Absence of Falls  Description: Document [place] Fall Risk and appropriate interventions in the flowsheet.   Outcome: Progressing Towards Goal  Note: Fall Risk Interventions:  Mobility Interventions: Assess mobility with egress test, Bed/chair exit alarm, OT consult for ADLs, Patient to call before getting OOB    Mentation Interventions: Adequate sleep, hydration, pain control, Bed/chair exit alarm    Medication Interventions: Assess postural VS orthostatic hypotension, Bed/chair exit alarm, Evaluate medications/consider consulting pharmacy, Patient to call before getting OOB, Teach patient to arise slowly    Elimination Interventions: Bed/chair exit alarm, Call light in reach, Patient to call for help with toileting needs, Toilet paper/wipes in reach, Toileting schedule/hourly rounds    History of Falls Interventions: Bed/chair exit alarm, Consult care management for discharge planning, Investigate reason for fall         Problem: Patient Education: Go to Patient Education Activity  Goal: Patient/Family Education  Outcome: Progressing Towards Goal     Problem: Patient Education: Go to Patient Education Activity  Goal: Patient/Family Education  Outcome: Progressing Towards Goal     Problem: Patient Education: Go to Patient Education Activity  Goal: Patient/Family Education  Outcome: Progressing Towards Goal     Problem: Patient Education: Go to Patient Education Activity  Goal: Patient/Family Education  Outcome: Progressing Towards Goal     Problem: Sepsis: Day of Diagnosis  Goal: Off Pathway (Use only if patient is Off Pathway)  Outcome: Progressing Towards Goal  Goal: *Fluid resuscitation  Outcome: Progressing Towards Goal  Goal: *Paired blood cultures prior to first dose of antibiotic  Outcome: Progressing Towards Goal  Goal: *First dose of  appropriate antibiotic within 3 hours of arrival to ED, within 1 hour of arrival to ICU  Outcome: Progressing Towards Goal  Goal: *Lactic acid with first set of blood cultures  Outcome: Progressing Towards Goal  Goal: *Pneumococcal immunization (if eligible)  Outcome: Progressing Towards Goal  Goal: *Influenza immunization (if eligible)  Outcome: Progressing Towards Goal  Goal: Activity/Safety  Outcome: Progressing Towards Goal  Goal: Consults, if ordered  Outcome: Progressing Towards Goal  Goal: Diagnostic Test/Procedures  Outcome: Progressing Towards Goal  Goal: Nutrition/Diet  Outcome: Progressing Towards Goal  Goal: Discharge Planning  Outcome: Progressing Towards Goal  Goal: Medications  Outcome: Progressing Towards Goal  Goal: Respiratory  Outcome: Progressing Towards Goal  Goal: Treatments/Interventions/Procedures  Outcome: Progressing Towards Goal  Goal: Psychosocial  Outcome: Progressing Towards Goal     Problem: Sepsis: Day 2  Goal: Off Pathway (Use only if patient is Off Pathway)  Outcome: Progressing Towards Goal  Goal: *Central Venous Pressure maintained at 8-12 mm Hg  Outcome: Progressing Towards Goal  Goal: *Hemodynamically stable  Outcome: Progressing Towards Goal  Goal: *Tolerating diet  Outcome: Progressing Towards Goal  Goal: Activity/Safety  Outcome: Progressing Towards Goal  Goal: Consults, if ordered  Outcome: Progressing Towards Goal  Goal: Diagnostic Test/Procedures  Outcome: Progressing Towards Goal  Goal: Nutrition/Diet  Outcome: Progressing Towards Goal  Goal: Discharge Planning  Outcome: Progressing Towards Goal  Goal: Medications  Outcome: Progressing Towards Goal  Goal: Respiratory  Outcome: Progressing Towards Goal  Goal: Treatments/Interventions/Procedures  Outcome: Progressing Towards Goal  Goal: Psychosocial  Outcome: Progressing Towards Goal     Problem: Sepsis: Day 3  Goal: Off Pathway (Use only if patient is Off Pathway)  Outcome: Progressing Towards Goal  Goal: *Central Venous Pressure maintained at 8-12 mm Hg  Outcome: Progressing Towards Goal  Goal: *Oxygen saturation within defined limits  Outcome: Progressing Towards Goal  Goal: *Vital sign stability  Outcome: Progressing Towards Goal  Goal: *Tolerating diet  Outcome: Progressing Towards Goal  Goal: *Demonstrates progressive activity  Outcome: Progressing Towards Goal  Goal: Activity/Safety  Outcome: Progressing Towards Goal  Goal: Consults, if ordered  Outcome: Progressing Towards Goal  Goal: Diagnostic Test/Procedures  Outcome: Progressing Towards Goal  Goal: Nutrition/Diet  Outcome: Progressing Towards Goal  Goal: Discharge Planning  Outcome: Progressing Towards Goal  Goal: Medications  Outcome: Progressing Towards Goal  Goal: Respiratory  Outcome: Progressing Towards Goal  Goal: Treatments/Interventions/Procedures  Outcome: Progressing Towards Goal  Goal: Psychosocial  Outcome: Progressing Towards Goal     Problem: Sepsis: Day 4  Goal: Off Pathway (Use only if patient is Off Pathway)  Outcome: Progressing Towards Goal  Goal: Activity/Safety  Outcome: Progressing Towards Goal  Goal: Consults, if ordered  Outcome: Progressing Towards Goal  Goal: Diagnostic Test/Procedures  Outcome: Progressing Towards Goal  Goal: Nutrition/Diet  Outcome: Progressing Towards Goal  Goal: Discharge Planning  Outcome: Progressing Towards Goal  Goal: Medications  Outcome: Progressing Towards Goal  Goal: Respiratory  Outcome: Progressing Towards Goal  Goal: Treatments/Interventions/Procedures  Outcome: Progressing Towards Goal  Goal: Psychosocial  Outcome: Progressing Towards Goal  Goal: *Oxygen saturation within defined limits  Outcome: Progressing Towards Goal  Goal: *Hemodynamically stable  Outcome: Progressing Towards Goal  Goal: *Vital signs within defined limits  Outcome: Progressing Towards Goal  Goal: *Tolerating diet  Outcome: Progressing Towards Goal  Goal: *Demonstrates progressive activity  Outcome: Progressing Towards Goal  Goal: *Fluid volume maintenance  Outcome: Progressing Towards Goal     Problem: Sepsis: Day 5  Goal: Off Pathway (Use only if patient is Off Pathway)  Outcome: Progressing Towards Goal  Goal: *Oxygen saturation within defined limits  Outcome: Progressing Towards Goal  Goal: *Vital signs within defined limits  Outcome: Progressing Towards Goal  Goal: *Tolerating diet  Outcome: Progressing Towards Goal  Goal: *Demonstrates progressive activity  Outcome: Progressing Towards Goal  Goal: *Discharge plan identified  Outcome: Progressing Towards Goal  Goal: Activity/Safety  Outcome: Progressing Towards Goal  Goal: Consults, if ordered  Outcome: Progressing Towards Goal  Goal: Diagnostic Test/Procedures  Outcome: Progressing Towards Goal  Goal: Nutrition/Diet  Outcome: Progressing Towards Goal  Goal: Discharge Planning  Outcome: Progressing Towards Goal  Goal: Medications  Outcome: Progressing Towards Goal  Goal: Respiratory  Outcome: Progressing Towards Goal  Goal: Treatments/Interventions/Procedures  Outcome: Progressing Towards Goal  Goal: Psychosocial  Outcome: Progressing Towards Goal     Problem: Sepsis: Day 6  Goal: Off Pathway (Use only if patient is Off Pathway)  Outcome: Progressing Towards Goal  Goal: *Oxygen saturation within defined limits  Outcome: Progressing Towards Goal  Goal: *Vital signs within defined limits  Outcome: Progressing Towards Goal  Goal: *Tolerating diet  Outcome: Progressing Towards Goal  Goal: *Demonstrates progressive activity  Outcome: Progressing Towards Goal  Goal: Influenza immunization  Outcome: Progressing Towards Goal  Goal: *Pneumococcal immunization  Outcome: Progressing Towards Goal  Goal: Activity/Safety  Outcome: Progressing Towards Goal  Goal: Diagnostic Test/Procedures  Outcome: Progressing Towards Goal  Goal: Nutrition/Diet  Outcome: Progressing Towards Goal  Goal: Discharge Planning  Outcome: Progressing Towards Goal  Goal: Medications  Outcome: Progressing Towards Goal  Goal: Respiratory  Outcome: Progressing Towards Goal  Goal: Treatments/Interventions/Procedures  Outcome: Progressing Towards Goal  Goal: Psychosocial  Outcome: Progressing Towards Goal     Problem: Sepsis: Discharge Outcomes  Goal: *Vital signs within defined limits  Outcome: Progressing Towards Goal  Goal: *Tolerating diet  Outcome: Progressing Towards Goal  Goal: *Verbalizes understanding and describes prescribed diet  Outcome: Progressing Towards Goal  Goal: *Demonstrates progressive activity  Outcome: Progressing Towards Goal  Goal: *Describes follow-up/return visits to physicians  Outcome: Progressing Towards Goal  Goal: *Verbalizes name, dosage, time, side effects, and number of days to continue medications  Outcome: Progressing Towards Goal  Goal: *Influenza immunization (Oct-Mar only)  Outcome: Progressing Towards Goal  Goal: *Pneumococcal immunization  Outcome: Progressing Towards Goal  Goal: *Lungs clear or at baseline  Outcome: Progressing Towards Goal  Goal: *Oxygen saturation returns to baseline or 90% or better on room air  Outcome: Progressing Towards Goal  Goal: *Glycemic control  Outcome: Progressing Towards Goal  Goal: *Absence of deep venous thrombosis signs and symptoms(Stroke Metric)  Outcome: Progressing Towards Goal  Goal: *Describes available resources and support systems  Outcome: Progressing Towards Goal  Goal: *Optimal pain control at patient's stated goal  Outcome: Progressing Towards Goal

## 2021-12-05 NOTE — PROGRESS NOTES
Problem: Airway Clearance - Ineffective  Goal: Achieve or maintain patent airway  Outcome: Progressing Towards Goal     Problem: Gas Exchange - Impaired  Goal: Absence of hypoxia  Outcome: Progressing Towards Goal  Goal: Promote optimal lung function  Outcome: Progressing Towards Goal     Problem: Breathing Pattern - Ineffective  Goal: Ability to achieve and maintain a regular respiratory rate  Outcome: Progressing Towards Goal     Problem: Body Temperature -  Risk of, Imbalanced  Goal: Ability to maintain a body temperature within defined limits  Outcome: Progressing Towards Goal  Goal: Will regain or maintain usual level of consciousness  Outcome: Progressing Towards Goal  Goal: Complications related to the disease process, condition or treatment will be avoided or minimized  Outcome: Progressing Towards Goal     Problem: Isolation Precautions - Risk of Spread of Infection  Goal: Prevent transmission of infectious organism to others  Outcome: Progressing Towards Goal     Problem: Nutrition Deficits  Goal: Optimize nutrtional status  Outcome: Progressing Towards Goal     Problem: Risk for Fluid Volume Deficit  Goal: Maintain normal heart rhythm  Outcome: Progressing Towards Goal  Goal: Maintain absence of muscle cramping  Outcome: Progressing Towards Goal  Goal: Maintain normal serum potassium, sodium, calcium, phosphorus, and pH  Outcome: Progressing Towards Goal     Problem: Loneliness or Risk for Loneliness  Goal: Demonstrate positive use of time alone when socialization is not possible  Outcome: Progressing Towards Goal     Problem: Fatigue  Goal: Verbalize increase energy and improved vitality  Outcome: Progressing Towards Goal     Problem: Patient Education: Go to Patient Education Activity  Goal: Patient/Family Education  Outcome: Progressing Towards Goal     Problem: Falls - Risk of  Goal: *Absence of Falls  Description: Document [place] Fall Risk and appropriate interventions in the flowsheet.   Outcome: Progressing Towards Goal  Note: Fall Risk Interventions:  Mobility Interventions: Bed/chair exit alarm, Patient to call before getting OOB    Mentation Interventions: Adequate sleep, hydration, pain control, Bed/chair exit alarm, More frequent rounding, Room close to nurse's station, Toileting rounds    Medication Interventions: Patient to call before getting OOB, Teach patient to arise slowly    Elimination Interventions: Call light in reach, Patient to call for help with toileting needs    History of Falls Interventions: Bed/chair exit alarm, Investigate reason for fall, Room close to nurse's station         Problem: Patient Education: Go to Patient Education Activity  Goal: Patient/Family Education  Outcome: Progressing Towards Goal     Problem: Patient Education: Go to Patient Education Activity  Goal: Patient/Family Education  Outcome: Progressing Towards Goal     Problem: Sepsis: Discharge Outcomes  Goal: *Vital signs within defined limits  Outcome: Progressing Towards Goal  Goal: *Tolerating diet  Outcome: Progressing Towards Goal  Goal: *Verbalizes understanding and describes prescribed diet  Outcome: Progressing Towards Goal  Goal: *Demonstrates progressive activity  Outcome: Progressing Towards Goal  Goal: *Describes follow-up/return visits to physicians  Outcome: Progressing Towards Goal  Goal: *Verbalizes name, dosage, time, side effects, and number of days to continue medications  Outcome: Progressing Towards Goal  Goal: *Influenza immunization (Oct-Mar only)  Outcome: Progressing Towards Goal  Goal: *Pneumococcal immunization  Outcome: Progressing Towards Goal  Goal: *Lungs clear or at baseline  Outcome: Progressing Towards Goal  Goal: *Oxygen saturation returns to baseline or 90% or better on room air  Outcome: Progressing Towards Goal  Goal: *Glycemic control  Outcome: Progressing Towards Goal  Goal: *Absence of deep venous thrombosis signs and symptoms(Stroke Metric)  Outcome: Progressing Towards Goal  Goal: *Describes available resources and support systems  Outcome: Progressing Towards Goal  Goal: *Optimal pain control at patient's stated goal  Outcome: Progressing Towards Goal     Problem: Pressure Injury - Risk of  Goal: *Prevention of pressure injury  Description: Document Yong Scale and appropriate interventions in the flowsheet.   Outcome: Progressing Towards Goal  Note: Pressure Injury Interventions:  Sensory Interventions: Assess changes in LOC, Keep linens dry and wrinkle-free    Moisture Interventions: Absorbent underpads, Apply protective barrier, creams and emollients, Offer toileting Q_hr    Activity Interventions: Assess need for specialty bed, Increase time out of bed, PT/OT evaluation    Mobility Interventions: Assess need for specialty bed, HOB 30 degrees or less, PT/OT evaluation    Nutrition Interventions: Document food/fluid/supplement intake    Friction and Shear Interventions: Apply protective barrier, creams and emollients, Feet elevated on foot rest, Minimize layers

## 2021-12-06 ENCOUNTER — APPOINTMENT (OUTPATIENT)
Dept: GENERAL RADIOLOGY | Age: 46
DRG: 207 | End: 2021-12-06
Attending: INTERNAL MEDICINE
Payer: MEDICARE

## 2021-12-06 ENCOUNTER — ANESTHESIA EVENT (OUTPATIENT)
Dept: SURGERY | Age: 46
DRG: 207 | End: 2021-12-06
Payer: MEDICARE

## 2021-12-06 ENCOUNTER — ANESTHESIA (OUTPATIENT)
Dept: ENDOSCOPY | Age: 46
DRG: 207 | End: 2021-12-06
Payer: MEDICARE

## 2021-12-06 ENCOUNTER — ANESTHESIA EVENT (OUTPATIENT)
Dept: ENDOSCOPY | Age: 46
DRG: 207 | End: 2021-12-06
Payer: MEDICARE

## 2021-12-06 ENCOUNTER — ANESTHESIA (OUTPATIENT)
Dept: SURGERY | Age: 46
DRG: 207 | End: 2021-12-06
Payer: MEDICARE

## 2021-12-06 LAB
ANION GAP SERPL CALC-SCNC: 13 MMOL/L (ref 5–15)
ANION GAP SERPL CALC-SCNC: 8 MMOL/L (ref 5–15)
ANION GAP SERPL CALC-SCNC: 9 MMOL/L (ref 5–15)
ARTERIAL PATENCY WRIST A: ABNORMAL
ARTERIAL PATENCY WRIST A: POSITIVE
BASE DEFICIT BLD-SCNC: 1.5 MMOL/L
BASE DEFICIT BLDA-SCNC: 2.2 MMOL/L
BDY SITE: ABNORMAL
BDY SITE: ABNORMAL
BUN SERPL-MCNC: 62 MG/DL (ref 6–20)
BUN SERPL-MCNC: 65 MG/DL (ref 6–20)
BUN SERPL-MCNC: 65 MG/DL (ref 6–20)
BUN/CREAT SERPL: 11 (ref 12–20)
CALCIUM SERPL-MCNC: 6.8 MG/DL (ref 8.5–10.1)
CALCIUM SERPL-MCNC: 7.1 MG/DL (ref 8.5–10.1)
CALCIUM SERPL-MCNC: 8.4 MG/DL (ref 8.5–10.1)
CHLORIDE SERPL-SCNC: 100 MMOL/L (ref 97–108)
CHLORIDE SERPL-SCNC: 103 MMOL/L (ref 97–108)
CHLORIDE SERPL-SCNC: 103 MMOL/L (ref 97–108)
CO2 SERPL-SCNC: 23 MMOL/L (ref 21–32)
CO2 SERPL-SCNC: 25 MMOL/L (ref 21–32)
CO2 SERPL-SCNC: 27 MMOL/L (ref 21–32)
CREAT SERPL-MCNC: 5.68 MG/DL (ref 0.7–1.3)
CREAT SERPL-MCNC: 5.82 MG/DL (ref 0.7–1.3)
CREAT SERPL-MCNC: 6.12 MG/DL (ref 0.7–1.3)
CRP SERPL-MCNC: 9.19 MG/DL (ref 0–0.6)
ERYTHROCYTE [DISTWIDTH] IN BLOOD BY AUTOMATED COUNT: 15.3 % (ref 11.5–14.5)
ERYTHROCYTE [DISTWIDTH] IN BLOOD BY AUTOMATED COUNT: 23.7 % (ref 11.5–14.5)
FERRITIN SERPL-MCNC: 1339 NG/ML (ref 26–388)
FIO2 ON VENT: 100 %
GAS FLOW.O2 O2 DELIVERY SYS: ABNORMAL L/MIN
GAS FLOW.O2 SETTING OXYMISER: 18 L/MIN
GLUCOSE SERPL-MCNC: 104 MG/DL (ref 65–100)
GLUCOSE SERPL-MCNC: 114 MG/DL (ref 65–100)
GLUCOSE SERPL-MCNC: 157 MG/DL (ref 65–100)
HCO3 BLD-SCNC: 22.5 MMOL/L (ref 22–26)
HCO3 BLDA-SCNC: 22 MMOL/L (ref 22–26)
HCT VFR BLD AUTO: 18.6 % (ref 36.6–50.3)
HCT VFR BLD AUTO: 21.6 % (ref 36.6–50.3)
HGB BLD-MCNC: 6.1 G/DL (ref 12.1–17)
HGB BLD-MCNC: 6.6 G/DL (ref 12.1–17)
HGB BLD-MCNC: 7.2 G/DL (ref 12.1–17)
HISTORY CHECKED?,CKHIST: NORMAL
HISTORY CHECKED?,CKHIST: NORMAL
INR PPP: 1.4 (ref 0.9–1.1)
LACTATE SERPL-SCNC: 5.4 MMOL/L (ref 0.4–2)
LACTATE SERPL-SCNC: 6.3 MMOL/L (ref 0.4–2)
LIPASE SERPL-CCNC: 1177 U/L (ref 73–393)
MCH RBC QN AUTO: 30.3 PG (ref 26–34)
MCH RBC QN AUTO: 31.4 PG (ref 26–34)
MCHC RBC AUTO-ENTMCNC: 32.8 G/DL (ref 30–36.5)
MCHC RBC AUTO-ENTMCNC: 33.3 G/DL (ref 30–36.5)
MCV RBC AUTO: 90.8 FL (ref 80–99)
MCV RBC AUTO: 95.9 FL (ref 80–99)
NRBC # BLD: 0.07 K/UL (ref 0–0.01)
NRBC # BLD: 0.48 K/UL (ref 0–0.01)
NRBC BLD-RTO: 0.7 PER 100 WBC
NRBC BLD-RTO: 3.7 PER 100 WBC
O2/TOTAL GAS SETTING VFR VENT: 1 %
PCO2 BLD: 32.3 MMHG (ref 35–45)
PCO2 BLDA: 35 MMHG (ref 35–45)
PEEP RESPIRATORY: 5 CM[H2O]
PH BLD: 7.45 [PH] (ref 7.35–7.45)
PH BLDA: 7.41 [PH] (ref 7.35–7.45)
PLATELET # BLD AUTO: 40 K/UL (ref 150–400)
PLATELET # BLD AUTO: 87 K/UL (ref 150–400)
PMV BLD AUTO: 12.2 FL (ref 8.9–12.9)
PMV BLD AUTO: 12.9 FL (ref 8.9–12.9)
PO2 BLD: 65 MMHG (ref 80–100)
PO2 BLDA: 436 MMHG (ref 80–100)
POTASSIUM SERPL-SCNC: 3.7 MMOL/L (ref 3.5–5.1)
POTASSIUM SERPL-SCNC: 4.4 MMOL/L (ref 3.5–5.1)
POTASSIUM SERPL-SCNC: 4.9 MMOL/L (ref 3.5–5.1)
PROCALCITONIN SERPL-MCNC: 5.95 NG/ML
PROTHROMBIN TIME: 14.8 SEC (ref 9–11.1)
RBC # BLD AUTO: 1.94 M/UL (ref 4.1–5.7)
RBC # BLD AUTO: 2.38 M/UL (ref 4.1–5.7)
SAO2 % BLD: 100 % (ref 92–97)
SAO2 % BLD: 93.6 % (ref 92–97)
SAO2% DEVICE SAO2% SENSOR NAME: ABNORMAL
SODIUM SERPL-SCNC: 135 MMOL/L (ref 136–145)
SODIUM SERPL-SCNC: 137 MMOL/L (ref 136–145)
SODIUM SERPL-SCNC: 139 MMOL/L (ref 136–145)
SPECIMEN SITE: ABNORMAL
SPECIMEN TYPE: ABNORMAL
VANCOMYCIN SERPL-MCNC: 19.3 UG/ML
VENTILATION MODE VENT: ABNORMAL
VT SETTING VENT: 400 ML
WBC # BLD AUTO: 10.8 K/UL (ref 4.1–11.1)
WBC # BLD AUTO: 13 K/UL (ref 4.1–11.1)

## 2021-12-06 PROCEDURE — 82803 BLOOD GASES ANY COMBINATION: CPT

## 2021-12-06 PROCEDURE — 36600 WITHDRAWAL OF ARTERIAL BLOOD: CPT

## 2021-12-06 PROCEDURE — 80048 BASIC METABOLIC PNL TOTAL CA: CPT

## 2021-12-06 PROCEDURE — 86850 RBC ANTIBODY SCREEN: CPT

## 2021-12-06 PROCEDURE — 2709999900 HC NON-CHARGEABLE SUPPLY: Performed by: INTERNAL MEDICINE

## 2021-12-06 PROCEDURE — P9059 PLASMA, FRZ BETWEEN 8-24HOUR: HCPCS

## 2021-12-06 PROCEDURE — 74011000250 HC RX REV CODE- 250: Performed by: ANESTHESIOLOGY

## 2021-12-06 PROCEDURE — 86644 CMV ANTIBODY: CPT

## 2021-12-06 PROCEDURE — C9113 INJ PANTOPRAZOLE SODIUM, VIA: HCPCS | Performed by: INTERNAL MEDICINE

## 2021-12-06 PROCEDURE — 76040000019: Performed by: INTERNAL MEDICINE

## 2021-12-06 PROCEDURE — 83690 ASSAY OF LIPASE: CPT

## 2021-12-06 PROCEDURE — 93005 ELECTROCARDIOGRAM TRACING: CPT

## 2021-12-06 PROCEDURE — P9040 RBC LEUKOREDUCED IRRADIATED: HCPCS

## 2021-12-06 PROCEDURE — 74011250636 HC RX REV CODE- 250/636: Performed by: ANESTHESIOLOGY

## 2021-12-06 PROCEDURE — 85018 HEMOGLOBIN: CPT

## 2021-12-06 PROCEDURE — 74011250636 HC RX REV CODE- 250/636: Performed by: INTERNAL MEDICINE

## 2021-12-06 PROCEDURE — 77030038665 HC SOL ELEVIEW INJ AGNT ARPH -B: Performed by: INTERNAL MEDICINE

## 2021-12-06 PROCEDURE — 76060000031 HC ANESTHESIA FIRST 0.5 HR: Performed by: INTERNAL MEDICINE

## 2021-12-06 PROCEDURE — 86923 COMPATIBILITY TEST ELECTRIC: CPT

## 2021-12-06 PROCEDURE — 71045 X-RAY EXAM CHEST 1 VIEW: CPT

## 2021-12-06 PROCEDURE — 74011000250 HC RX REV CODE- 250: Performed by: INTERNAL MEDICINE

## 2021-12-06 PROCEDURE — 84145 PROCALCITONIN (PCT): CPT

## 2021-12-06 PROCEDURE — 94002 VENT MGMT INPAT INIT DAY: CPT

## 2021-12-06 PROCEDURE — 74011250636 HC RX REV CODE- 250/636: Performed by: NURSE PRACTITIONER

## 2021-12-06 PROCEDURE — P9016 RBC LEUKOCYTES REDUCED: HCPCS

## 2021-12-06 PROCEDURE — 83605 ASSAY OF LACTIC ACID: CPT

## 2021-12-06 PROCEDURE — 36415 COLL VENOUS BLD VENIPUNCTURE: CPT

## 2021-12-06 PROCEDURE — P9073 PLATELETS PHERESIS PATH REDU: HCPCS

## 2021-12-06 PROCEDURE — 74011000258 HC RX REV CODE- 258: Performed by: INTERNAL MEDICINE

## 2021-12-06 PROCEDURE — P9047 ALBUMIN (HUMAN), 25%, 50ML: HCPCS | Performed by: INTERNAL MEDICINE

## 2021-12-06 PROCEDURE — 90935 HEMODIALYSIS ONE EVALUATION: CPT

## 2021-12-06 PROCEDURE — 0BH17EZ INSERTION OF ENDOTRACHEAL AIRWAY INTO TRACHEA, VIA NATURAL OR ARTIFICIAL OPENING: ICD-10-PCS | Performed by: INTERNAL MEDICINE

## 2021-12-06 PROCEDURE — 86140 C-REACTIVE PROTEIN: CPT

## 2021-12-06 PROCEDURE — 2709999900 HC NON-CHARGEABLE SUPPLY

## 2021-12-06 PROCEDURE — 85610 PROTHROMBIN TIME: CPT

## 2021-12-06 PROCEDURE — P9012 CRYOPRECIPITATE EACH UNIT: HCPCS

## 2021-12-06 PROCEDURE — 65610000006 HC RM INTENSIVE CARE

## 2021-12-06 PROCEDURE — 31500 INSERT EMERGENCY AIRWAY: CPT

## 2021-12-06 PROCEDURE — 82728 ASSAY OF FERRITIN: CPT

## 2021-12-06 PROCEDURE — 74011250637 HC RX REV CODE- 250/637: Performed by: INTERNAL MEDICINE

## 2021-12-06 PROCEDURE — 86920 COMPATIBILITY TEST SPIN: CPT

## 2021-12-06 PROCEDURE — 80202 ASSAY OF VANCOMYCIN: CPT

## 2021-12-06 PROCEDURE — 36430 TRANSFUSION BLD/BLD COMPNT: CPT

## 2021-12-06 PROCEDURE — 5A1955Z RESPIRATORY VENTILATION, GREATER THAN 96 CONSECUTIVE HOURS: ICD-10-PCS | Performed by: INTERNAL MEDICINE

## 2021-12-06 PROCEDURE — 77030003657 HC NDL SCLER BSC -B: Performed by: INTERNAL MEDICINE

## 2021-12-06 PROCEDURE — 85027 COMPLETE CBC AUTOMATED: CPT

## 2021-12-06 RX ORDER — EPINEPHRINE 0.1 MG/ML
INJECTION INTRACARDIAC; INTRAVENOUS
Status: DISPENSED
Start: 2021-12-06 | End: 2021-12-07

## 2021-12-06 RX ORDER — ALBUMIN HUMAN 250 G/1000ML
25 SOLUTION INTRAVENOUS ONCE
Status: DISCONTINUED | OUTPATIENT
Start: 2021-12-06 | End: 2021-12-06

## 2021-12-06 RX ORDER — SODIUM CHLORIDE 9 MG/ML
250 INJECTION, SOLUTION INTRAVENOUS AS NEEDED
Status: DISCONTINUED | OUTPATIENT
Start: 2021-12-06 | End: 2021-12-07

## 2021-12-06 RX ORDER — FENTANYL CITRATE 50 UG/ML
25-50 INJECTION, SOLUTION INTRAMUSCULAR; INTRAVENOUS
Status: DISCONTINUED | OUTPATIENT
Start: 2021-12-06 | End: 2021-12-08

## 2021-12-06 RX ORDER — EPINEPHRINE 0.1 MG/ML
1 INJECTION INTRACARDIAC; INTRAVENOUS ONCE
Status: COMPLETED | OUTPATIENT
Start: 2021-12-06 | End: 2021-12-06

## 2021-12-06 RX ORDER — ETOMIDATE 2 MG/ML
20 INJECTION INTRAVENOUS ONCE
Status: COMPLETED | OUTPATIENT
Start: 2021-12-06 | End: 2021-12-06

## 2021-12-06 RX ORDER — NOREPINEPHRINE BITARTRATE/D5W 8 MG/250ML
.5-3 PLASTIC BAG, INJECTION (ML) INTRAVENOUS
Status: DISCONTINUED | OUTPATIENT
Start: 2021-12-06 | End: 2021-12-08

## 2021-12-06 RX ORDER — ROCURONIUM BROMIDE 10 MG/ML
50 INJECTION, SOLUTION INTRAVENOUS
Status: COMPLETED | OUTPATIENT
Start: 2021-12-06 | End: 2021-12-06

## 2021-12-06 RX ORDER — CHLORHEXIDINE GLUCONATE 1.2 MG/ML
15 RINSE ORAL EVERY 12 HOURS
Status: DISCONTINUED | OUTPATIENT
Start: 2021-12-06 | End: 2021-12-24 | Stop reason: HOSPADM

## 2021-12-06 RX ORDER — PROPOFOL 10 MG/ML
0-50 VIAL (ML) INTRAVENOUS
Status: DISCONTINUED | OUTPATIENT
Start: 2021-12-06 | End: 2021-12-11

## 2021-12-06 RX ORDER — PHYTONADIONE 10 MG/ML
10 INJECTION, EMULSION INTRAMUSCULAR; INTRAVENOUS; SUBCUTANEOUS ONCE
Status: COMPLETED | OUTPATIENT
Start: 2021-12-06 | End: 2021-12-06

## 2021-12-06 RX ORDER — ALBUMIN HUMAN 250 G/1000ML
25 SOLUTION INTRAVENOUS ONCE
Status: COMPLETED | OUTPATIENT
Start: 2021-12-06 | End: 2021-12-06

## 2021-12-06 RX ORDER — METOCLOPRAMIDE HYDROCHLORIDE 5 MG/ML
10 INJECTION INTRAMUSCULAR; INTRAVENOUS ONCE
Status: COMPLETED | OUTPATIENT
Start: 2021-12-06 | End: 2021-12-06

## 2021-12-06 RX ORDER — CHLORHEXIDINE GLUCONATE 0.12 MG/ML
15 RINSE ORAL EVERY 12 HOURS
Status: DISCONTINUED | OUTPATIENT
Start: 2021-12-06 | End: 2021-12-06

## 2021-12-06 RX ORDER — PROPOFOL 10 MG/ML
INJECTION, EMULSION INTRAVENOUS
Status: COMPLETED
Start: 2021-12-06 | End: 2021-12-06

## 2021-12-06 RX ORDER — ROCURONIUM BROMIDE 10 MG/ML
INJECTION, SOLUTION INTRAVENOUS AS NEEDED
Status: DISCONTINUED | OUTPATIENT
Start: 2021-12-06 | End: 2021-12-06 | Stop reason: HOSPADM

## 2021-12-06 RX ORDER — ALBUMIN HUMAN 250 G/1000ML
25 SOLUTION INTRAVENOUS
Status: DISCONTINUED | OUTPATIENT
Start: 2021-12-06 | End: 2021-12-24 | Stop reason: HOSPADM

## 2021-12-06 RX ADMIN — PHENYLEPHRINE HYDROCHLORIDE 30 MCG/MIN: 10 INJECTION INTRAVENOUS at 15:59

## 2021-12-06 RX ADMIN — PHENYLEPHRINE HYDROCHLORIDE 85 MCG/MIN: 10 INJECTION INTRAVENOUS at 18:19

## 2021-12-06 RX ADMIN — SODIUM CHLORIDE 40 MG: 9 INJECTION INTRAMUSCULAR; INTRAVENOUS; SUBCUTANEOUS at 22:02

## 2021-12-06 RX ADMIN — HYDROMORPHONE HYDROCHLORIDE 2 MG: 1 INJECTION, SOLUTION INTRAMUSCULAR; INTRAVENOUS; SUBCUTANEOUS at 02:22

## 2021-12-06 RX ADMIN — ETOMIDATE 10 MG: 2 INJECTION, SOLUTION INTRAVENOUS at 13:54

## 2021-12-06 RX ADMIN — PHENYLEPHRINE HYDROCHLORIDE 150 MCG/MIN: 10 INJECTION INTRAVENOUS at 21:11

## 2021-12-06 RX ADMIN — Medication 4 MCG/MIN: at 13:40

## 2021-12-06 RX ADMIN — CHLORHEXIDINE GLUCONATE 15 ML: 1.2 RINSE ORAL at 22:02

## 2021-12-06 RX ADMIN — DESMOPRESSIN ACETATE 19.52 MCG: 4 SOLUTION INTRAVENOUS at 14:47

## 2021-12-06 RX ADMIN — Medication 50 MCG/HR: at 18:38

## 2021-12-06 RX ADMIN — ONDANSETRON 4 MG: 2 INJECTION INTRAMUSCULAR; INTRAVENOUS at 09:12

## 2021-12-06 RX ADMIN — METOCLOPRAMIDE 10 MG: 5 INJECTION, SOLUTION INTRAMUSCULAR; INTRAVENOUS at 10:31

## 2021-12-06 RX ADMIN — PHENYLEPHRINE HYDROCHLORIDE 90 MCG/MIN: 10 INJECTION INTRAVENOUS at 18:24

## 2021-12-06 RX ADMIN — PHENYLEPHRINE HYDROCHLORIDE 200 MCG: 10 INJECTION INTRAVENOUS at 14:46

## 2021-12-06 RX ADMIN — ROCURONIUM BROMIDE 50 MG: 10 INJECTION INTRAVENOUS at 14:41

## 2021-12-06 RX ADMIN — Medication 20 MCG/MIN: at 20:41

## 2021-12-06 RX ADMIN — PROPOFOL 50 MCG/KG/MIN: 10 INJECTION, EMULSION INTRAVENOUS at 23:39

## 2021-12-06 RX ADMIN — PHYTONADIONE 10 MG: 10 INJECTION, EMULSION INTRAMUSCULAR; INTRAVENOUS; SUBCUTANEOUS at 10:35

## 2021-12-06 RX ADMIN — PROPOFOL 50 MCG/KG/MIN: 10 INJECTION, EMULSION INTRAVENOUS at 18:17

## 2021-12-06 RX ADMIN — Medication 50 MG: at 13:30

## 2021-12-06 RX ADMIN — SODIUM CHLORIDE 40 MG: 9 INJECTION INTRAMUSCULAR; INTRAVENOUS; SUBCUTANEOUS at 15:49

## 2021-12-06 RX ADMIN — EPINEPHRINE 1 MG: 0.1 INJECTION INTRACARDIAC; INTRAVENOUS at 15:48

## 2021-12-06 RX ADMIN — HYDROMORPHONE HYDROCHLORIDE 2 MG: 1 INJECTION, SOLUTION INTRAMUSCULAR; INTRAVENOUS; SUBCUTANEOUS at 06:23

## 2021-12-06 RX ADMIN — Medication 19 MCG/MIN: at 15:52

## 2021-12-06 RX ADMIN — Medication 10 ML: at 06:23

## 2021-12-06 RX ADMIN — Medication 1 AMPULE: at 22:02

## 2021-12-06 RX ADMIN — VANCOMYCIN HYDROCHLORIDE 1000 MG: 1 INJECTION, POWDER, LYOPHILIZED, FOR SOLUTION INTRAVENOUS at 15:52

## 2021-12-06 RX ADMIN — EPOETIN ALFA-EPBX 12000 UNITS: 10000 INJECTION, SOLUTION INTRAVENOUS; SUBCUTANEOUS at 22:02

## 2021-12-06 RX ADMIN — Medication 9 MCG/MIN: at 14:35

## 2021-12-06 RX ADMIN — SODIUM CHLORIDE 250 ML: 900 INJECTION, SOLUTION INTRAVENOUS at 13:00

## 2021-12-06 RX ADMIN — Medication 40 ML: at 22:08

## 2021-12-06 RX ADMIN — PROPOFOL 25 MCG/KG/MIN: 10 INJECTION, EMULSION INTRAVENOUS at 13:59

## 2021-12-06 RX ADMIN — Medication 10 ML: at 15:46

## 2021-12-06 RX ADMIN — ALBUMIN (HUMAN) 25 G: 0.25 INJECTION, SOLUTION INTRAVENOUS at 12:20

## 2021-12-06 NOTE — PROGRESS NOTES
Pharmacist Daily Dosing of Warfarin    Indication & Goal INR: DVT, INR Goal 2-3    PTA Warfarin Dose: 2.5 mg daily    Notable concurrent conditions and medications: Aspirin    Labs:  Recent Labs     12/05/21  0831 12/03/21  1555   INR 1.4* 2.0*   TBILI  --  1.0   ALB  --  2.7*       Impression/Plan:   Pt having hematemesis  Receiving vit K 10 mg  Hold warfarin until bleeding resolves  Daily INR has been ordered  CBC w/o differential every other day has been ordered      Pharmacy will follow daily and adjust the dose as appropriate.     Thank you,  Bandar Vargas, PHARMD

## 2021-12-06 NOTE — PROCEDURES
[facility]                   Endoscopic Gastroduodenoscopy Procedure Note      12/6/2021  Latoya Vasquez  1975  [id number]    Procedure: Endoscopic Gastroduodenoscopy with control of bleeding    Indication: Melena, hematemesis, acute blood loss anemia     Pre-operative Diagnosis: see indication above    Post-operative Diagnosis: see findings below    : TOMÁS Chaevz MD    Referring Provider:  Burton Hennessy MD      Anesthesia/Sedation:  MAC anesthesia Propofol    Airway assessment: No airway problems anticipated    Pre-Procedural Exam:      Airway: clear, no airway problems anticipated  Heart: RRR, without gallops or rubs  Lungs: clear bilaterally without wheezes, crackles, or rhonchi  Abdomen: soft, nontender, nondistended, bowel sounds present  Mental Status: awake, alert and oriented to person, place and time       Procedure Details     After infomed consent was obtained for the procedure, with all risks and benefits of procedure explained the patient was taken to the endoscopy suite and placed in the left lateral decubitus position. Following sequential administration of sedation as per above, the endoscope was inserted into the mouth and advanced under direct vision to stomach. A careful inspection was made as the gastroscope was withdrawn, including a retroflexed view of the proximal stomach; findings and interventions are described below. Findings:   Esophagus:  Full of fresh blood and large clots. Eventually cleared and two ulcers (10 mm) and (5 mm) seen in distal esophagus at 42 cm. Very friable, but no active bleeding. Injected Epi 1 : 10,000 total of 8.5 cc into area around both ulcers. Stomach: Full of huge clots, so only about 20 % of mucosa seen. Clots in stomach are dark and look old. Cannot find pylorus.   Duodenum: not entered due to blood obscurring    Therapies:  Injection of epinephrine as above    Specimens: none Complications:   None; patient tolerated the procedure well. EBL:  None. Impression:      1. Two distal esophageal ulcers as above  2. Inadequate visualization of stomach  3. Unable to find pylorus to enter duodenum    Recommendations:  -Continue acid suppression. , -Follow clinical symptoms and laboratory studies for evidence of rebleeding. , -Serial CBC  Repeat EGD later this week---timing TBD  NPO  Avoid all anticoagulants    Monique Alvares MD12/6/2021

## 2021-12-06 NOTE — ANESTHESIA PREPROCEDURE EVALUATION
Relevant Problems   RESPIRATORY SYSTEM   (+) Pneumonia      CARDIOVASCULAR   (+) CAD (coronary artery disease)   (+) Congestive heart failure, unspecified   (+) Coronary atherosclerosis of native coronary artery   (+) HTN (hypertension)      RENAL FAILURE   (+) ESRD (end stage renal disease) (HCC)   (+) ESRD (end stage renal disease) on dialysis (HCC)      HEMATOLOGY   (+) Anemia       Anesthetic History     PONV          Review of Systems / Medical History  Patient summary reviewed, nursing notes reviewed and pertinent labs reviewed    Pulmonary  Within defined limits                 Neuro/Psych   Within defined limits  seizures         Cardiovascular  Within defined limits  Hypertension      CHF: NYHA Classification III  Dysrhythmias   Pacemaker, past MI, CAD and cardiac stents    Exercise tolerance: <4 METS  Comments: TTE 3/2021  · LV: Estimated LVEF is 30 - 35%. Normal cavity size. Moderately and globally reduced systolic function. Inconclusive left ventricular diastolic function. · LA: Moderately dilated left atrium. · MV: Mild mitral valve regurgitation is present. · PA: Mild pulmonary hypertension. Pulmonary arterial systolic pressure is 41 mmHg. · There is a mobile structure in the left ventricle of unclear etiology. Consider EVER if clinically indicated    EVER  · No signs of endocarditis by EVER. · Mobile structure seen on TTE unclear, may have been artifact vs. left sided moderator band, the sub valvular mitral apparatus does show some calcium in the pappillary muscle and cords, but no signs of endocarditis.     EKG  Normal sinus rhythm   T wave abnormality, consider inferior ischemia   Prolonged QT    GI/Hepatic/Renal         Renal disease: ESRD and dialysis  PUD     Endo/Other        Anemia     Other Findings   Comments: GI bleed intubated in ICU           Physical Exam    Airway          Intubated   Cardiovascular  Regular rate and rhythm,  S1 and S2 normal,  no murmur, click, rub, or gallop             Dental    Dentition: Poor dentition     Pulmonary                 Abdominal  GI exam deferred       Other Findings            Anesthetic Plan    ASA: 4, emergent  Anesthesia type: general          Induction: Intravenous  Anesthetic plan and risks discussed with: Patient      Intubated, can be done at bedside. Happy to help ICU if requested.

## 2021-12-06 NOTE — PROGRESS NOTES
Name: Alberta Jessica: [address]   : 1975 Admit Date: 2021   Phone: [phone]  Room:    PCP: Demetris Hernandez MD  MRN: [id number]   Date: 2021  Code: Full Code          IMPRESSION  · Acute Respiratory Failure with Hypoxia- now on room air  · Covid-19; CXR now with interstitial edema , CRP still 4-5 despite steroids  · Unvaccinated for COVIDESRD on HD  · Chronic abdominal pain  · Pancreatitis- chemcal; gallbladder removed, pancreas not abnormal on last Ct scan; LIPASE > 1000  · CM s/p ICD placement  · Severe Pulmonary HTN   · CHF LVEF 35-40%  · Left arm swelling- s/p angioplasty of left subclavian vein  · Gram positive sepsis from dental abscess- left tooth  · s/p lead extraction at [facility]  · Anemia of ckd  · Hx of renal tx in past times 2.  · Hypertension  · CAD  · Failed RTX times 2  · H/o DVT, s/p ivc filter/ h/o HIT   · Contrast allergy in history- no details     PLAN  · Would ask that pt see Pulmonary Hypertesion clinic at [facility]  · Renal following  · Coumadin dosed per pharmacy  · COVID vaccine counseling done  · Nothing to add, will sign off       21: Events of weekend discussed with Dr. Da Vaz. Notes and data reviewed. 12/3/21: OOB in chair on room air. sats 99%. He is concerned about the swelling in legs and left arm this past week. Notes and data reviewed. No cough. Now with loose stools!! No nausea. Denies cough congestion. Denies shortness of breath at rest.  Need to monitor him closely. CXR with mild interstitial edema vs early COVID infiltrates. No better after HD but  Volume not removed per pt. If he starts needing O2 and CRP higher, will add Baricitinib. PCT down but still high and > 7. Ferritin high. CRp slightly down. 21: On HD. No SOB. On Room air. Discussed with dr. Latesha Ryder. Procalcitonin was very high. Notes and data reviewed. No nausea. Denies cough congestion. Now on room air.   Denies shortness of breath at rest.  Need to monitor him closely. CXR now with mild interstitial edema vs early COVID infiltrates? If he starts needing O2 and CRP higher, will add Baricitinib. Would exercise and seee if he drops sats. If CXR improves after HD, likely pulmonary edema. 12/1/21: On Decadron. Chest and abdominal pain all night. Notes and data reviewed. No nausea. Denies cough congestion. Now on room air. Denies shortness of breath at rest.  Need to monitor him closely. Course is unpredictable. CRP same but CXR now with mild interstitial edema vs early COVID infiltrates? If he starts needing O2 and CRP higher, will add Baricitinib      11/30/21: Lots of heartburn this AM. On Decadron. NOtes and data reviewed. No nausea. Denies cough congestion. Now on room air. Denies shortness of breath at rest.  Need to monitor him closely. Course is unpredictable. If CRP normal in AM, would allow us to be more comfortable if discharged on steroids. If CRP higher, will add Baricitinib     11/29/21: Events of weekend, Notes and data reviewed. Patient now starting hemodialysis. Has back and abdominal discomfort. No nausea. Denies cough congestion. Now on room air. Denies shortness of breath at rest.  Explained to the patient the potential of him getting much worse. Need to monitor him closely. Course is unpredictable. Currently on some treatment. Not a candidate for Remdesivir. Chart and notes reviewed. Data reviewed. I review the patient's current medications in the medical record at each encounter.  I have evaluated and examined the patient. 8:40 AM       History was obtained from patient. I was asked by Aster Arellano MD to see Augustina Enriquez in consultation for a chief complaint of Covid-19 infection.     History of Present Illness:   if a 43 yo gentleman with a PMH ESRD on HD MWF, hypertension, ischemic cardiomyopathy s/pp ICD placement, and GERD that presented to the ED on 11/27 with worsening fatigue and SOB. Rapid covid test positive. He is unvaccinated against Covid-19. He is currently on RA on my exam today with sats at 96%. Feels a little better. Images:      CXR 11/27: no acute process    Abdominal CT 11/27:  Relatively small right pleural effusion and minimal left pleural effusion, increased since 11/5/2021. Mya Akbar No acute findings in the abdomen or pelvis. .  Renal atrophy and calcified transplant kidneys. Hepatomegaly. No focal lesion. Small amount of ascites. Anasarca. D-dimer 3.15  Lactic acid 8.5  Trop 519  Ferritin 9604  Procalcitonin 26.76  CRP 9.91  Blood cultures negative x 18 hours  ECHO 11/7/21: EF 35-40%, mildly dilated LA, mildly dilated RV, mildly reduced systolic function, trave MVR, moderate TVR. Severe pulmonary hypertension RVSP 85mmHg,     Past Medical History:   Diagnosis Date    Abdominal hematoma     AICD (automatic cardioverter/defibrillator) present     CAD (coronary artery disease)     anterior MI s/p 2 stents  2007    Chronic abdominal pain     Chronic kidney disease     ESRD; Dialysis dependent.  Home [facility] labs- [facility] tpke    DVT (deep venous thrombosis) ([address] 2001    DVT of popliteal vein ([address] 11/2011    not anticoagulated due to bleeding, IVC filter    Endocarditis     Gallstone pancreatitis     Gastrointestinal disorder     acid reflux    Gastrointestinal disorder     peptic ulcer    Hemodialysis patient ([address]     High cholesterol     Hypertension     Kidney transplant     b/l kidneys 1995, 2001    Long term current use of anticoagulant therapy     Nephrotic syndrome     Other ill-defined conditions(799.89)     kidny transplant x2,  on dialysis    Other ill-defined conditions(799.89)     home dialysis    Other ill-defined conditions(799.89)     hx recurrent left leg DVT    Peritonitis ([address]     Seizures ([address] 2015    most recent- only had three in lifetime    Small bowel obstruction (HCC)     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    V-tach [facility])        Past Surgical History:   Procedure Laterality Date    HX APPENDECTOMY      HX CHOLECYSTECTOMY      HX OTHER SURGICAL  11/2011    exploratory laparotomy    HX OTHER SURGICAL      fem-pop    HX OTHER SURGICAL  02/2013    right upper extremity fistula    HX PACEMAKER Left 6/2009    AICD-st latonya-not connected    HX PACEMAKER Left     AICD-left side-BS-working    HX SMALL BOWEL RESECTION      HX TRANSPLANT  2001     Kidney    HX TRANSPLANT  1992    kidney    HX VASCULAR ACCESS Right     femoral access for HD- does 5 day dialysis @ home    IR REMOVE TUNL CVAD W/O PORT / PUMP  10/29/2020    IR REPLACE CVC TUNNELED W/O PORT  9/10/2020    IL CARDIAC SURG PROCEDURE UNLIST  2007    2 stents    IL EDG US EXAM SURGICAL ALTER STOM DUODENUM/JEJUNUM  7/15/2011         IL ESOPHAGOGASTRODUODENOSCOPY TRANSORAL DIAGNOSTIC  5/24/2012         VASCULAR SURGERY PROCEDURE UNLIST  11/14/2017    Insertion AV graft right upper arm (bovine); ligation of AV fistula right arm       Family History   Problem Relation Age of Onset    COPD Mother     Lung Disease Mother     Cancer Father         stomach    Cancer Maternal Grandmother        Social History     Tobacco Use    Smoking status: Never Smoker    Smokeless tobacco: Never Used   Substance Use Topics    Alcohol use: No       Allergies   Allergen Reactions    Shellfish Containing Products Swelling     Break out in rash, trouble breathing    Contrast Agent [Iodine] Shortness of Breath    Heparin Analogues Other (comments)     Positive history of HIT       Current Facility-Administered Medications   Medication Dose Route Frequency    Vancomycin level 4 am 12/6   Other ONCE    sucralfate (CARAFATE) tablet 1 g  1 g Oral AC&HS    HYDROmorphone (DILAUDID) injection 2 mg  2 mg IntraVENous Q4H PRN    metoprolol succinate (TOPROL-XL) XL tablet 12.5 mg  12.5 mg Oral DAILY    epoetin emilie-epbx (RETACRIT) 12,000 Units combo injection  12,000 Units SubCUTAneous Q MON, WED & FRI    calcium carbonate (TUMS) chewable tablet 200 mg [elemental]  200 mg Oral TID WITH MEALS    aluminum-magnesium hydroxide (MAALOX) oral suspension 15 mL  15 mL Oral QID PRN    famotidine (PEPCID) tablet 20 mg  20 mg Oral DAILY    nitroglycerin (NITROSTAT) tablet 0.4 mg  0.4 mg SubLINGual PRN    sodium chloride (NS) flush 5-10 mL  5-10 mL IntraVENous PRN    acetaminophen (TYLENOL) tablet 650 mg  650 mg Oral Q6H PRN    L.acidophilus-paracasei-S.thermophil-bifidobacter (RISAQUAD) 8 billion cell capsule  1 Capsule Oral DAILY    aspirin chewable tablet 81 mg  81 mg Oral DAILY    calcitRIOL (ROCALTROL) capsule 0.5 mcg  0.5 mcg Oral DAILY    calcium acetate(phosphat bind) (PHOSLO) capsule 1,334 mg  2 Capsule Oral TID WITH MEALS    pantoprazole (PROTONIX) tablet 40 mg  40 mg Oral ACB    sodium chloride (NS) flush 5-40 mL  5-40 mL IntraVENous Q8H    sodium chloride (NS) flush 5-40 mL  5-40 mL IntraVENous PRN    polyethylene glycol (MIRALAX) packet 17 g  17 g Oral DAILY PRN    ondansetron (ZOFRAN ODT) tablet 4 mg  4 mg Oral Q8H PRN    Or    ondansetron (ZOFRAN) injection 4 mg  4 mg IntraVENous Q6H PRN    oxyCODONE IR (ROXICODONE) tablet 5 mg  5 mg Oral Q4H PRN    ascorbic acid (vitamin C) (VITAMIN C) tablet 500 mg  500 mg Oral DAILY    guaiFENesin ER (MUCINEX) tablet 600 mg  600 mg Oral Q12H    albuterol (PROVENTIL HFA, VENTOLIN HFA, PROAIR HFA) inhaler 2 Puff  2 Puff Inhalation Q6H PRN    Warfarin - Pharmacy to Dose   Other Rx Dosing/Monitoring    Vancomycin - Pharmacy to dose   Other Rx Dosing/Monitoring         REVIEW OF SYSTEMS   Negative except as stated in the HPI. Physical Exam:   Visit Vitals  /65   Pulse 98   Temp 98.2 °F (36.8 °C)   Resp 14   Ht 5' 7\" (1.702 m)   Wt 65 kg (143 lb 4.8 oz)   SpO2 97%   BMI 22.44 kg/m²       General:  Alert, cooperative, no distress, appears stated age.    Head:  Normocephalic, without obvious abnormality, atraumatic. Eyes:  Conjunctivae/corneas clear. Nose: Nares normal. Septum midline. Mucosa normal.    Throat: Lips, mucosa, and tongue normal. Teeth and gums normal.   Neck: Supple, symmetrical, trachea midline. Lungs:   Clear to auscultation bilaterally. Chest wall:  No tenderness or deformity. Heart:  Regular rate and rhythm, S1, S2 normal, no murmur, click, rub or gallop. Abdomen:   Soft, non-tender. Bowel sounds normal. No masses,  No organomegaly. Extremities: Extremities normal, atraumatic, no cyanosis or edema. Pulses: 2+ and symmetric all extremities. Skin: Skin color, texture, turgor normal. No rashes or lesions   Lymph nodes: Cervical, supraclavicular, and axillary nodes normal.   Neurologic: Grossly nonfocal       Lab Results   Component Value Date/Time    Sodium 134 (L) 12/03/2021 03:55 PM    Potassium 3.8 12/03/2021 03:55 PM    Chloride 100 12/03/2021 03:55 PM    CO2 26 12/03/2021 03:55 PM    BUN 49 (H) 12/03/2021 03:55 PM    Creatinine 5.91 (H) 12/03/2021 03:55 PM    Glucose 172 (H) 12/03/2021 03:55 PM    Calcium 8.5 12/03/2021 03:55 PM    Magnesium 2.5 (H) 12/03/2021 03:55 PM    Phosphorus 5.5 (H) 12/01/2021 04:40 AM    Lactic acid 1.4 12/05/2021 08:31 AM       Lab Results   Component Value Date/Time    WBC 10.3 12/01/2021 04:40 AM    HGB 9.7 (L) 12/01/2021 04:40 AM    PLATELET 88 (L) 03/95/1530 04:40 AM    MCV 97.7 12/01/2021 04:40 AM       Lab Results   Component Value Date/Time    INR 1.4 (H) 12/05/2021 08:31 AM    aPTT 47.0 (H) 11/05/2021 04:34 PM    Alk.  phosphatase 226 (H) 12/03/2021 03:55 PM    Protein, total 7.2 12/03/2021 03:55 PM    Albumin 2.7 (L) 12/03/2021 03:55 PM    Globulin 4.5 (H) 12/03/2021 03:55 PM       Lab Results   Component Value Date/Time    Iron 57 11/22/2018 05:27 AM    TIBC 234 (L) 11/22/2018 05:27 AM    Iron % saturation 24 11/22/2018 05:27 AM    Ferritin 1,940 (H) 12/03/2021 03:55 PM       Lab Results   Component Value Date/Time    C-Reactive protein 2.44 (H) 12/03/2021 03:55 PM    TSH 1.28 09/26/2014 08:32 PM        No results found for: PH, PHI, PCO2, PCO2I, PO2, PO2I, HCO3, HCO3I, FIO2, FIO2I    Lab Results   Component Value Date/Time     01/16/2019 07:09 AM    CK-MB Index 2.0 01/16/2019 07:09 AM    Troponin-I, Qt. <0.05 03/29/2021 05:06 AM    BNP 1,946 (H) 09/16/2014 04:17 AM        Lab Results   Component Value Date/Time    Culture result: NO GROWTH 6 DAYS 11/27/2021 02:05 PM    Culture result: NO GROWTH 6 DAYS 11/27/2021 01:40 PM    Culture result: NO GROWTH 5 DAYS 11/07/2021 09:50 AM       Lab Results   Component Value Date/Time    Hepatitis B surface Ag 0.50 12/01/2021 04:40 AM       Lab Results   Component Value Date/Time    Vancomycin,trough 17.7 (H) 12/08/2011 02:59 AM    Vancomycin,trough 19.5 (H) 12/02/2011 02:40 AM     01/16/2019 07:09 AM    CK 62 05/20/2016 06:34 AM       Lab Results   Component Value Date/Time    Color RED 11/16/2012 05:15 PM    Appearance OPAQUE 11/16/2012 05:15 PM    Specific gravity 1.020 11/16/2012 05:15 PM    pH (UA) 8.0 11/16/2012 05:15 PM    Protein >300 (A) 11/16/2012 05:15 PM    Glucose 100 (A) 11/16/2012 05:15 PM    Ketone NEGATIVE  11/16/2012 05:15 PM    Bilirubin NEGATIVE  09/17/2012 01:35 AM    Blood MODERATE (A) 11/16/2012 05:15 PM    Urobilinogen 0.2 11/16/2012 05:15 PM    Nitrites NEGATIVE  11/16/2012 05:15 PM    Leukocyte Esterase NEGATIVE  11/16/2012 05:15 PM    WBC >100 (H) 11/16/2012 05:15 PM    RBC >100 (H) 11/16/2012 05:15 PM    Bacteria 3+ (A) 11/16/2012 05:15 PM     ·     Thanks for the consult.       Sigrid Bosch MD

## 2021-12-06 NOTE — PROGRESS NOTES
.. TRANSFER - OUT REPORT:    Verbal report given to Ji(name) on Carol Rutledge  being transferred to [facility](unit) for ordered procedure       Report consisted of patients Situation, Background, Assessment and   Recommendations(SBAR). Information from the following report(s) Procedure Summary, MAR and Accordion was reviewed with the receiving nurse. Lines:   Quad Lumen quad lumen 12/06/21 Proximal; Right Femoral (Active)   Central Line Being Utilized Yes 12/06/21 1500   Criteria for Appropriate Use Hemodynamically unstable, requiring monitoring lines, vasopressors, or volume resuscitation 12/06/21 1500   Site Assessment Clean, dry, & intact 12/06/21 1500   Infiltration Assessment 0 12/06/21 1500   Affected Extremity/Extremities Color distal to insertion site pink (or appropriate for race); Pulses palpable 12/06/21 1500   Date of Last Dressing Change 12/06/21 12/06/21 1500   Dressing Status Clean, dry, & intact 12/06/21 1500   Dressing Type Disk with Chlorhexadine gluconate (CHG); Transparent 12/06/21 1500   Proximal Hub Color/Line Status Vonita Decree; Infusing 12/06/21 1500   Medial 1 Hub Color/Line Status Blue; Infusing 12/06/21 1500   Medial 2 Hub Color/Line Status White;  Infusing 12/06/21 1500   Distal Hub Color/Line Status Brown 12/06/21 1500   Alcohol Cap Used Yes 12/06/21 1500       Peripheral IV 11/28/21 Anterior; Proximal; Right Forearm (Active)   Site Assessment Clean, dry, & intact 12/06/21 0800   Phlebitis Assessment 0 12/06/21 0800   Infiltration Assessment 0 12/06/21 0800   Dressing Status Clean, dry, & intact 12/06/21 0800   Dressing Type Transparent; Tape 12/06/21 0800   Hub Color/Line Status Blue 12/06/21 0800   Action Taken Open ports on tubing capped 12/05/21 1630   Alcohol Cap Used Yes 12/05/21 1630       Peripheral IV 12/02/21 Right Antecubital (Active)   Site Assessment Clean, dry, & intact 12/06/21 0800   Phlebitis Assessment 0 12/06/21 0800   Infiltration Assessment 0 12/06/21 0800   Dressing Status Clean, dry, & intact 12/06/21 0800   Dressing Type Transparent; Tape 12/06/21 0800   Hub Color/Line Status Blue 12/06/21 0800   Action Taken Open ports on tubing capped 12/05/21 1630   Alcohol Cap Used Yes 12/05/21 1630       Peripheral IV 12/05/21 Right; Inner Forearm (Active)   Site Assessment Clean, dry, & intact 12/06/21 0800   Phlebitis Assessment 0 12/06/21 0800   Infiltration Assessment 0 12/06/21 0800   Dressing Status Clean, dry, & intact 12/06/21 0800   Dressing Type Transparent; Tape 12/06/21 0800   Hub Color/Line Status Blue 12/06/21 0800   Action Taken Open ports on tubing capped 12/05/21 1630   Alcohol Cap Used Yes 12/05/21 1630        Opportunity for questions and clarification was provided.       Patient transported with:   Registered Nurse

## 2021-12-06 NOTE — PROGRESS NOTES
Nephrology Progress Note  Blaise Leblanc     [email or web address]  Phone - [phone]   Patient: Letty Main    YOB: 1975        Date- 12/6/2021   Admit Date: 11/27/2021  CC: Follow up for  esrd         IMPRESSION & PLAN:   · esrd- home HD 5 days per week- Follows up with Dr Rhonda Hu at [facility] ( now [facility] for IV antibiotics.)  · Covid 19 infection  · hyperkalemia  · Left arm swelling- s/p angioplasty of left subclavian vein  · Gram positive sepsis from dental abcess  · s/p lead extraction at [facility]  · Anemia of ckd  · Hx of renal tx in past times 2.  · Hypertension  · CAD  · Failed RTX times 2         H/o DVT, s/p ivc filter/ h/o HIT      PLAN-   Seen on hd today   No fluid removal on hd   Give nacl for low bp   Check cbc.  May need prbc tx   If he can't tolerate hd- he may need CRRT   Continue phoslo    EPO for anemia     Subjective: Interval History:   - seen on hd today    He had hematemesis today  bp low on hd        Objective:   Vitals:    12/06/21 1019 12/06/21 1020 12/06/21 1030 12/06/21 1035   BP: (!) 82/39 (!) 81/37 (!) 105/59 92/61   Pulse: 92 89 90 96   Resp:       Temp:       TempSrc:       SpO2:  (!) 46%  100%   Weight:       Height:          12/05 0701 - 12/06 0700  In: 1080 [P.O.:1080]  Out: -   Last 3 Recorded Weights in this Encounter    12/04/21 0616 12/05/21 0634 12/06/21 0619   Weight: 63.2 kg (139 lb 5.3 oz) 64.7 kg (142 lb 10.2 oz) 65 kg (143 lb 4.8 oz)      Physical exam:   GEN: NAD  NECK- Supple  RESP: no distress  NEURO:non focal          Chart reviewed. Pertinent Notes reviewed.      Data Review :  Recent Labs     12/06/21  0939 12/03/21  1555   * 134*   K 3.7 3.8    100   CO2 27 26   BUN 65* 49*   CREA 6.12* 5.91*   * 172*   CA 8.4* 8.5   MG  --  2.5*     Recent Labs     12/06/21  0939   WBC 10.8   HGB 6.1*   HCT 18.6*   PLT 87*     Recent Labs     12/03/21  1555   FERR 1,940*      Medication list reviewed  Current Facility-Administered Medications   Medication Dose Route Frequency    pantoprazole (PROTONIX) 40 mg in 0.9% sodium chloride 10 mL injection  40 mg IntraVENous Q12H    Warfarin- Hold until bleeding has resolved   Other Rx Dosing/Monitoring    albumin human 25% (BUMINATE) solution 25 g  25 g IntraVENous DIALYSIS PRN    Vancomycin level 4 am 12/6   Other ONCE    HYDROmorphone (DILAUDID) injection 2 mg  2 mg IntraVENous Q4H PRN    metoprolol succinate (TOPROL-XL) XL tablet 12.5 mg  12.5 mg Oral DAILY    epoetin emilie-epbx (RETACRIT) 12,000 Units combo injection  12,000 Units SubCUTAneous Q MON, WED & FRI    [Held by provider] calcium carbonate (TUMS) chewable tablet 200 mg [elemental]  200 mg Oral TID WITH MEALS    aluminum-magnesium hydroxide (MAALOX) oral suspension 15 mL  15 mL Oral QID PRN    nitroglycerin (NITROSTAT) tablet 0.4 mg  0.4 mg SubLINGual PRN    sodium chloride (NS) flush 5-10 mL  5-10 mL IntraVENous PRN    acetaminophen (TYLENOL) tablet 650 mg  650 mg Oral Q6H PRN    L.acidophilus-paracasei-S.thermophil-bifidobacter (RISAQUAD) 8 billion cell capsule  1 Capsule Oral DAILY    [Held by provider] aspirin chewable tablet 81 mg  81 mg Oral DAILY    calcitRIOL (ROCALTROL) capsule 0.5 mcg  0.5 mcg Oral DAILY    calcium acetate(phosphat bind) (PHOSLO) capsule 1,334 mg  2 Capsule Oral TID WITH MEALS    sodium chloride (NS) flush 5-40 mL  5-40 mL IntraVENous Q8H    sodium chloride (NS) flush 5-40 mL  5-40 mL IntraVENous PRN    polyethylene glycol (MIRALAX) packet 17 g  17 g Oral DAILY PRN    ondansetron (ZOFRAN ODT) tablet 4 mg  4 mg Oral Q8H PRN    Or    ondansetron (ZOFRAN) injection 4 mg  4 mg IntraVENous Q6H PRN    oxyCODONE IR (ROXICODONE) tablet 5 mg  5 mg Oral Q4H PRN    ascorbic acid (vitamin C) (VITAMIN C) tablet 500 mg  500 mg Oral DAILY    guaiFENesin ER (MUCINEX) tablet 600 mg  600 mg Oral Q12H    albuterol (PROVENTIL HFA, VENTOLIN HFA, PROAIR HFA) inhaler 2 Puff  2 Puff Inhalation Q6H PRN    Vancomycin - Pharmacy to dose   Other Rx Dosing/Monitoring          Sven Thurman, [address] [facility]  [facility]  [address], [address]  [place], [address]  Phone - [phone]               Fax - [phone]

## 2021-12-06 NOTE — PROGRESS NOTES
Physical Therapy    Chart reviewed and discussed with RN, patient with bloody emesis this morning and Hgb now 6.1 and BP 92/61. Will defer today and continue to follow.     Polina Garcia

## 2021-12-06 NOTE — PROGRESS NOTES
TRANSFER - OUT REPORT:    Verbal report given to Hugo RN (name) on Adolph Quevedo  being transferred to CCU (unit) for change in patient condition(drop in BP, bleeding)       Report consisted of patients Situation, Background, Assessment and   Recommendations(SBAR). Information from the following report(s) SBAR, Kardex, ED Summary, MAR, Recent Results and Cardiac Rhythm NSR/Sinus Tach was reviewed with the receiving nurse. Lines:   Quad Lumen quad lumen 12/06/21 Proximal; Right Femoral (Active)       Peripheral IV 11/28/21 Anterior; Proximal; Right Forearm (Active)   Site Assessment Clean, dry, & intact 12/06/21 0334   Phlebitis Assessment 0 12/06/21 0334   Infiltration Assessment 0 12/06/21 0334   Dressing Status Clean, dry, & intact 12/06/21 0334   Dressing Type Tape; Transparent 12/06/21 0334   Hub Color/Line Status Blue; Capped 12/06/21 0334   Action Taken Open ports on tubing capped 12/05/21 1630   Alcohol Cap Used Yes 12/05/21 1630       Peripheral IV 12/02/21 Right Antecubital (Active)   Site Assessment Clean, dry, & intact 12/06/21 0334   Phlebitis Assessment 0 12/06/21 0334   Infiltration Assessment 0 12/06/21 0334   Dressing Status Clean, dry, & intact 12/06/21 0334   Dressing Type Tape; Transparent 12/06/21 0334   Hub Color/Line Status Blue; Capped 12/06/21 0334   Action Taken Open ports on tubing capped 12/05/21 1630   Alcohol Cap Used Yes 12/05/21 1630       Peripheral IV 12/05/21 Right; Inner Forearm (Active)   Site Assessment Clean, dry, & intact 12/06/21 0334   Phlebitis Assessment 0 12/06/21 0334   Infiltration Assessment 0 12/06/21 0334   Dressing Status Clean, dry, & intact 12/06/21 0334   Dressing Type Transparent 12/06/21 0334   Hub Color/Line Status Blue; Capped 12/06/21 0334   Action Taken Open ports on tubing capped 12/05/21 1630   Alcohol Cap Used Yes 12/05/21 1630        Opportunity for questions and clarification was provided.       Patient transported with: Monitor  Registered Nurse; [facility] RN (RRT)

## 2021-12-06 NOTE — PROCEDURES
[facility] Pulmonary Specialist  [facility] Procedure Note    Indication: Inadequate venous access      Risks, benefits, alternatives explained and consent obtained. Central line Bundle:    Hand hygiene performed. Full sterile barrier precautions used. 7-Step Sterility Protocol followed. (cap, mask sterile gown, sterile gloves, large sterile sheet, hand hygiene, 2% chlorhexidine for cutaneous antisepsis)  5 mL 1% Lidocaine placed at insertion site. Without using ultrasound guidance,   Right femoral cannulated x 1 attempt(s) utilizing the modified Seldinger technique. Good blood return. Catheter secured & Biopatch applied. Sterile Tegaderm placed.       Shikha Ascencio MD, FCCP, ATSF, FACP, DAABIP  Interventional Pulmonology/Critical [address]

## 2021-12-06 NOTE — PROCEDURES
Hemodialysis / [phone]    Vitals Pre Post Assessment Pre Post   BP BP: 92/61 (12/06/21 1035) 69/45 LOC A&Ox4, cooperative, follows commands A&Ox4, cooperative, follows commands   HR Pulse (Heart Rate): 96 (12/06/21 1035) 108 Lungs CTA bilaterally, denies shortness of breath CTA bilaterally, on 1 L NC per telephone order from Dr. Sylwia Unger: 14 (12/06/21 0934) 14 Cardiac Regular, S1, S2, complains of dull chest pain 8/10, informed primary RN (not new for patient per primary RN) Regular, S1, S2, denies chest pain   Temp Temp: 98.4 °F (36.9 °C) (12/06/21 0934) 98.4 Skin LUE AVG LUE AVG   Weight Pre-Dialysis Weight: 66.7 kg (147 lb) (12/02/21 0743) N/A Edema 2-3+ 2-3+   Tele status NSR ST Pain Pain Intensity 1: 3 (12/06/21 0653) 7/10 abdominal pain- primary RN aware-pt hypotensive     Orders   Duration: Start: 5911 End: 9613  (treatment ended early per telelphone order by Dr. Janice Morrissey) Total: Orders 3.5 hours; ACTUAL 1 hour 34 minutes    Dialyzer: Dialyzer/Set Up Inspection: Kellie Storey (12/06/21 0934)   Lui Rebollar Bath: Dialysate K (mEq/L): 2 (12/06/21 0934)   Ca Bath: Dialysate CA (mEq/L): 2.5 (12/06/21 0934)   Na: Dialysate NA (mEq/L): 138 (12/06/21 0934)   Bicarb: Dialysate HCO3 (mEq/L): 35 (12/06/21 0934)   Target Fluid Removal: Goal/Amount of Fluid to Remove (mL): 3000 mL (12/06/21 0934)     Access   Type & Location: LUE AVG   Comments:       LUE AVG: skin CDI. No s/s of infection. + B/T. No issues with cannulation or hemostasis. Running well at -400. BFR was titrated down during treatment due to hypotension and per verbal orders of Dr. Steven Fernandez.                                       Labs   HBsAg (Antigen) / date: 12/1/21 Negative                                   HBsAb (Antibody) / date: 11/11/20 904.40 IMMUNE   Source:    Obtained/Reviewed  Critical Results Called HGB   Date Value Ref Range Status   12/06/2021 6.1 (L) 12.1 - 17.0 g/dL Final     Comment:     INVESTIGATED PER DELTA CHECK PROTOCOL Potassium   Date Value Ref Range Status   12/06/2021 3.7 3.5 - 5.1 mmol/L Final     Calcium   Date Value Ref Range Status   12/06/2021 8.4 (L) 8.5 - 10.1 MG/DL Final     BUN   Date Value Ref Range Status   12/06/2021 65 (H) 6 - 20 MG/DL Final     Creatinine   Date Value Ref Range Status   12/06/2021 6.12 (H) 0.70 - 1.30 MG/DL Final        Meds Given   Name Dose Route   Zofran  4mg IV LUE AVG   Reglan 10 mg  IV LUE AVG    Vitamin K1 10 mg SubQ abdominal tissue     Adequacy / Fluid    Total Liters Process: 26.7 L   Net Fluid Removed: 0 mL removed  +1300 mL NS given during treatment for hypotension, Dr. Kvng Castañeda. Comments   Time Out Done:   (Time) @ 0930   Admitting Diagnosis: Acute Covid 19 infection syndrome    Consent obtained/signed: Informed Consent Verified: Yes (12/06/21 2835)   Machine / RO # Machine Number: [id number] (12/06/21 4941)   Primary Nurse Rpt Pre: Shane Verma RN   Primary Nurse Rpt Post: Shane Verma RN   Pt Education: Ordered Dialysis Treatment   Care Plan: CKD   Pts outpatient clinic: Home HD. Follows up with Dr Nadia Tipton at [facility] Summary   Comments:         SBAR received from primary RN. Dialysis RN arrived to patient room, pt up in chair, A&Ox4, physical assessment performed, dialysis  and safety testing performed per policy. Pt requested to use bathroom before treatment start, assisted patient to bathroom. Pt called for assistance, pt stated he felt nausea and requested Zofran, pt given emesis basin- pt vomited moderate amount of bloody emesis, informed primary RN who informed Dr. Edward Donis, pt was given IV Zofran, once patient was ready, dialysis RN assisted patient back to bed. Primary RN spoke with Dr. Edward Donis again regarding GI consult, and whether to start ordered dialysis, vital signs were stable, BP soft; orders received to continue with ordered dialysis and draw ordered labs STAT-  Consent signed and on file.     2470: Pt cannulated with 15G needles per policy & without issue. Labs drawn per request/ order. VSS. Dialysis Tx initiated. 1000: Within 15 minutes of treatment start, pt became hypotensive, pt remained alert and following commands, UF turned off, patient placed in supine position, pt given NS bolus. Pt did not respond to bolus, SBP lower, additional  NS bolus given, primary RN arrived to bedside and dialysis RN reviewed hypotension with her, she informed Dr. Andrzej Laurent; dialysis RN called Dr. Janet Chauhan and reviewed SBAR (hypotension, bolus given, bloody emesis at treatment start, GI consult. ...) telephone orders received to continue treatment, keep UF off, give NS bolus as needed, and to call if BP was unresponsive to saline boluses. 1030: SBP stable, pt vomited bloody emesis an additional 3 times and informed primary RN, Reglan and Vitamin K given per MAR. 1046: Pt hypotension returned, NS bolus given. Hgb returned 6.1, verbal orders received to draw type and screen, blood drawn and given to primary RN.  1109: Hypotension unresponsive to NS boluses, called Dr. Janet Chauhan and informed him of hypotension, boluses given, and Hgb 6.1 and ordered blood transfusion. Telephone orders received to end treatment and pt would be checked on tomorrow for HD. Ended tx per telephone order, All possible blood returned to patient. Hemostasis achieved without issue. Bed locked and in the lowest position, call bell and belongings in reach. SBAR given to Primary, RN. Patient is not stable at time of my departure. Pt remained hypotensive, awake and alert, oriented x 4, with emesis basin at his side and complaining of nausea and abdominal pain, primary RN aware and has informed attending hospitalist.     All Dialysis related medications have been reviewed.

## 2021-12-06 NOTE — PROGRESS NOTES
1300- Pt presented to unit with RRT d/t vomiting bright red emesis approx 200 ml. Pt bed covered in dark red stool and clots. Pt AOx4 and on room air; dual skin performed with Giovanni Crews. Pt has left arm fistula and x3 22g IV in right arm (1/3 IVs working). Dr. Alphonso Walker at the bedside to place emergent femoral line. 1315- TRANSFER - IN REPORT:    Verbal report received from Maylin(name) on [facility]  being received from [facility](unit) for change in patient condition(GI bleed)      Report consisted of patients Situation, Background, Assessment and   Recommendations(SBAR). Information from the following report(s) SBAR, Kardex, Intake/Output, MAR, Accordion and Recent Results was reviewed with the receiving nurse. Opportunity for questions and clarification was provided. Assessment completed upon patients arrival to unit and care assumed. 18- While Dr. Alphonso Walker placing femoral line pt vomited with questionable aspiration. O2 sats remained at 100%. Decision was made by Dr. Alphonso Walker and Sacha Maravilla to intubate pt.     1330- pt intubated without difficulty. O2 sat 100%. 18- Dr. Alphonso Walker ordered for blood products to be run wide open. Dr. Alphonso Walker placed OG tube and suctioned 150 mL from OG of bright red blood. 1424- Pt moving arms and becoming more active. Wrist restraints applied to protect the integrity of the ET tube. Orders received from Dr. Alphonso Walker. 1430-  titrating Levo to maintain MAP greater than 65. Continuing to transfuse blood products. 1500- GI at bedside for EGD. 1535- EGD complete. GI discontinued OG tube and does not want to place another one at this time. Vitals q 2.5 minsRNs at bedside running platelets. 1559-  Levo maxed at 20 mcg/min. Olegario started at 30 mcg/min. 1620- Hgb 6.6 and lactic acid 5.4. Dr. Alphonso Walker notified. 1900- Bedside shift change report given to Alvina (oncoming nurse) by Andi Moore (offgoing nurse).  Report included the following information SBAR, Kardex, MAR, Nikoleion and Recent Results.

## 2021-12-06 NOTE — CONSULTS
Pulmonary and Critical Care  Consult Note    Requesting Provider: Dr. Kurtis Malave    Reason for Consult: Acute GI bleeding/Hemorrhagic shock    HPI: The patient is a 46/M unvaccinated with COVID 23 vaccine, ESRD, CAD, DVT and multiple medical problems who presented with myalgia and fever, he was diagnosed with COVID 19. He was being treated for COVID 19 when he developed hematemesis and hypotension. We were consulted to transfer to ICU and further treatment. Briefly, patient has multiple medical problems as below. He was being treated for COVID 19. He was being seen by pulmonary consultants on the floor and was getting HD for his ESRD. He however developed hematemesis leading to acute drop in BP and Hb. GI was consulted who were planning to scope tomorrow. On my arrival, patient had about 200 ml of fresh blood in the pouch (from vomiting), he has blood all over his bed (lower GI). He was however awake, alert and following commands. His last documented systolic BP was 62'K. Review of Systems: All other systems have been reviewed and are negative except per HPI    Past Medical History:  Past Medical History:   Diagnosis Date    Abdominal hematoma     AICD (automatic cardioverter/defibrillator) present     CAD (coronary artery disease)     anterior MI s/p 2 stents  2007    Chronic abdominal pain     Chronic kidney disease     ESRD; Dialysis dependent.  Home [facility] does labs- [facility] tpke    DVT (deep venous thrombosis) ([address] 2001    DVT of popliteal vein ([address] 11/2011    not anticoagulated due to bleeding, IVC filter    Endocarditis     Gallstone pancreatitis     Gastrointestinal disorder     acid reflux    Gastrointestinal disorder     peptic ulcer    Hemodialysis patient ([address]     High cholesterol     Hypertension     Kidney transplant     b/l kidneys 1995, 2001    Long term current use of anticoagulant therapy     Nephrotic syndrome     Other ill-defined conditions(799.89)     jose transplant x2,  on dialysis    Other ill-defined conditions(799.89)     home dialysis    Other ill-defined conditions(799.89)     hx recurrent left leg DVT    [facility] [address]     Seizures ([facility] 2015    most recent- only had three in lifetime    Small bowel obstruction (HCC)     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 He has never used smokeless tobacco. He reports current drug use. Drugs: Prescription and OTC. He reports that he does not drink alcohol. Family History:  Family History   Problem Relation Age of Onset   Eda Tolbert COPD Mother    Eda Tolbert Lung Disease Mother     Cancer Father         stomach    Cancer Maternal Grandmother        Allergies:   Allergies   Allergen Reactions    Shellfish Containing Products Swelling     Break out in rash, trouble breathing    Contrast Agent [Iodine] Shortness of Breath    Heparin Analogues Other (comments)     Positive history of HIT       Medications:  Current Facility-Administered Medications   Medication Dose Route Frequency Provider Last Rate Last Admin    pantoprazole (PROTONIX) 40 mg in 0.9% sodium chloride 10 mL injection  40 mg IntraVENous Q12H Yumi Roman MD        Warfarin- Hold until bleeding has resolved   Other Rx Dosing/Monitoring Yumi Roman MD        albumin human 25% (BUMINATE) solution 25 g  25 g IntraVENous DIALYSIS PRN Yumi Roman MD        0.9% sodium chloride infusion 250 mL  250 mL IntraVENous PRN Yumi Roman MD        sodium chloride 0.9 % bolus infusion 250 mL  250 mL IntraVENous Howie Schultz MD 1,000 mL/hr at 12/06/21 1300 250 mL at 12/06/21 1300    Vancomycin level 4 am 12/6   Other ONCE Yumi Roman MD        HYDROmorphone (DILAUDID) injection 2 mg  2 mg IntraVENous Q4H PRN Yumi Roman MD   2 mg at 12/06/21 6342    [Held by provider] metoprolol succinate (TOPROL-XL) XL tablet 12.5 mg  12.5 mg Oral DAILY Kulwinder Parr MD 12.5 mg at 12/05/21 0909    epoetin emilie-epbx (RETACRIT) 12,000 Units combo injection  12,000 Units SubCUTAneous Q MON, WED & Kae Delong MD   12,000 Units at 12/03/21 2201    [Held by provider] calcium carbonate (TUMS) chewable tablet 200 mg [elemental]  200 mg Oral TID WITH MEALS Devyn KILGORE MD   200 mg at 12/05/21 1739    aluminum-magnesium hydroxide (MAALOX) oral suspension 15 mL  15 mL Oral QID PRN Trina Benjamin MD        nitroglycerin (NITROSTAT) tablet 0.4 mg  0.4 mg SubLINGual PRN Danay Siegel MD   0.4 mg at 12/01/21 1225    sodium chloride (NS) flush 5-10 mL  5-10 mL IntraVENous PRN Benito Arriola MD        acetaminophen (TYLENOL) tablet 650 mg  650 mg Oral Q6H PRN Tila Amador MD   650 mg at 12/04/21 2320    L.acidophilus-paracasei-S.thermophil-bifidobacter (RISAQUAD) 8 billion cell capsule  1 Capsule Oral DAILY Kalia CAMPOVERDE MD   1 Capsule at 12/05/21 0909    [Held by provider] aspirin chewable tablet 81 mg  81 mg Oral DAILY Kalia CAMPOVERDE MD   81 mg at 12/05/21 5610    calcitRIOL (ROCALTROL) capsule 0.5 mcg  0.5 mcg Oral DAILY Kalia CAMPOVERDE MD   0.5 mcg at 12/05/21 0908    calcium acetate(phosphat bind) (PHOSLO) capsule 1,334 mg  2 Capsule Oral TID WITH MEALS Kalia CAMPOVERDE MD   1,334 mg at 12/05/21 1739    sodium chloride (NS) flush 5-40 mL  5-40 mL IntraVENous Q8H Shweta Grimm MD   10 mL at 12/06/21 8777    sodium chloride (NS) flush 5-40 mL  5-40 mL IntraVENous PRN Inge Ha MD        polyethylene glycol (MIRALAX) packet 17 g  17 g Oral DAILY PRN Inge Ha MD        ondansetron (ZOFRAN ODT) tablet 4 mg  4 mg Oral Q8H PRN Kalia CAMPOVERDE MD        Or    ondansetron (ZOFRAN) injection 4 mg  4 mg IntraVENous Q6H PRN Kalia CAMPOVERDE MD   4 mg at 12/06/21 0912    oxyCODONE IR (ROXICODONE) tablet 5 mg  5 mg Oral Q4H PRN Kalia CAMPOVERDE MD   5 mg at 12/02/21 0145    albuterol (PROVENTIL HFA, VENTOLIN HFA, PROAIR HFA) inhaler 2 Puff  2 Puff Inhalation Q6H PRN Tegan Moon MD        Vancomycin - Pharmacy to dose   Other Rx Dosing/Monitoring Tiffany Turner MD           Vital Signs:  Visit Vitals  BP (!) 96/45   Pulse (!) 105   Temp 98.4 °F (36.9 °C)   Resp 14   Ht 5' 7\" (1.702 m)   Wt 65 kg (143 lb 4.8 oz)   SpO2 100%   BMI 22.44 kg/m²         Intake and Output:     Intake/Output Summary (Last 24 hours) at 12/6/2021 1306  Last data filed at 12/6/2021 1100  Gross per 24 hour   Intake 600 ml   Output 0 ml   Net 600 ml       Physical exam:   GEN: Sitting comfortably on the bed, not in acute distress. HEENT: anicteric sclerae, pink conj. NECK: Supple, -LAD, no neck mass  CV: no murmurs noted. LUNGS: Coarse BS at bases, otherwise no wheezes, rhonchi, rales  ABD: soft, non-tender, no masses  EXT: No cyanosis, distal pulses palpable, no edema  SKIN: warm, dry and intact. NEURO: Alert, awake and oriented x 3. No focal deficits.        Lab/Diagnostic Studies:    Recent Results (from the past 24 hour(s))   PROTHROMBIN TIME + INR    Collection Time: 12/06/21  9:39 AM   Result Value Ref Range    INR 1.4 (H) 0.9 - 1.1      Prothrombin time 14.8 (H) 9.0 - 11.1 sec   VANCOMYCIN, RANDOM    Collection Time: 12/06/21  9:39 AM   Result Value Ref Range    Vancomycin, random 19.3 UG/ML   FERRITIN    Collection Time: 12/06/21  9:39 AM   Result Value Ref Range    Ferritin 1,339 (H) 26 - 388 NG/ML   C REACTIVE PROTEIN, QT    Collection Time: 12/06/21  9:39 AM   Result Value Ref Range    C-Reactive protein 9.19 (H) 0.00 - 0.60 mg/dL   PROCALCITONIN    Collection Time: 12/06/21  9:39 AM   Result Value Ref Range    Procalcitonin 5.95 ng/mL   LIPASE    Collection Time: 12/06/21  9:39 AM   Result Value Ref Range    Lipase 1,177 (H) 73 - 012 U/L   METABOLIC PANEL, BASIC    Collection Time: 12/06/21  9:39 AM   Result Value Ref Range    Sodium 135 (L) 136 - 145 mmol/L    Potassium 3.7 3.5 - 5.1 mmol/L    Chloride 100 97 - 108 mmol/L    CO2 27 21 - 32 mmol/L    Anion gap 8 5 - 15 mmol/L    Glucose 104 (H) 65 - 100 mg/dL    BUN 65 (H) 6 - 20 MG/DL    Creatinine 6.12 (H) 0.70 - 1.30 MG/DL    BUN/Creatinine ratio 11 (L) 12 - 20      GFR est AA 12 (L) >60 ml/min/1.73m2    GFR est non-AA 10 (L) >60 ml/min/1.73m2    Calcium 8.4 (L) 8.5 - 10.1 MG/DL   CBC W/O DIFF    Collection Time: 12/06/21  9:39 AM   Result Value Ref Range    WBC 10.8 4.1 - 11.1 K/uL    RBC 1.94 (L) 4.10 - 5.70 M/uL    HGB 6.1 (L) 12.1 - 17.0 g/dL    HCT 18.6 (L) 36.6 - 50.3 %    MCV 95.9 80.0 - 99.0 FL    MCH 31.4 26.0 - 34.0 PG    MCHC 32.8 30.0 - 36.5 g/dL    RDW 23.7 (H) 11.5 - 14.5 %    PLATELET 87 (L) 753 - 400 K/uL    MPV 12.9 8.9 - 12.9 FL    NRBC 0.7 (H) 0  WBC    ABSOLUTE NRBC 0.07 (H) 0.00 - 0.01 K/uL   POC G3 - PUL    Collection Time: 12/06/21 12:42 PM   Result Value Ref Range    FIO2 (POC) 1 %    pH (POC) 7.45 7.35 - 7.45      pCO2 (POC) 32.3 (L) 35.0 - 45.0 MMHG    pO2 (POC) 65 (L) 80 - 100 MMHG    HCO3 (POC) 22.5 22 - 26 MMOL/L    sO2 (POC) 93.6 92 - 97 %    Base deficit (POC) 1.5 mmol/L    Site RIGHT RADIAL      Device: NASAL CANNULA      Allens test (POC) Positive      Specimen type (POC) ARTERIAL               Imaging:  CXR Results  (Last 48 hours)    None          CT Results  (Last 48 hours)    None                 Assessment      The patient is a 46/M unvaccinated with COVID 19 vaccine, ESRD, CAD, DVT and multiple medical problems who presented with myalgia and fever, he was diagnosed with COVID 19. He was being treated for COVID 19 when he developed hematemesis and hypotension. We were consulted to transfer to ICU and further treatment. Hematemesis/Acute GI bleeding/Acute blood loss anemia/Hemorrhagic shock -  -- Likely duodenal ulcer - patient has been complaining of epigastric pain for several days, he also had elevated lipase in the absence of pancreatitis, commonly seen in patients with perforation.    -- GI already consulted, they are aware and planning on doing EGD today. I myself spoke to GI NP.   --ICU admission, inpatient status. Anticipated Length of stay > 48hrs. --Volume resuscitation with IVF and PRBC when available. --Supportive transfusions to be continued to maintain adequate hemodynamics. --check q6h CBC, ionized calcium. --Will check coagulation parameters and correct accordingly. --Antiplatelets or anticoagulants will be held   --continue resuscitation and trend q 6 hour lactates. Avoid acidosis. --If needed repletion of calcium with CaCl for hemostasis and to maintain integrity of coagulation cascade  --Will use a blood warmer if more than three units are transfused. --Avoid hypothermia which would worsen coagulopathy  --Frequently monitore PT, aPTT, fibrinogen concentration, and platelet count to ensure reversal of (dilutional) coagulopathy.  --DDaVP since he has ESRD. --If pt requires massive transfusion, will transfuse 1:1:1 ratio of PRBCs, FFP, platelets.  --HOB elevated to 30 degrees if hemodynamics tolerate, otherwise lay flat or Trendelenburg. --Proton pump inhibitor to be administered intravenously  --NG/OG tube to LIWS  --strict NPO  --Insulin for glycemic control  --DVT prophylaxis with SCDs. COVID 19 infection - s/p treatment with steroids. Streptococcus intermedius bacteremia - Cont. Vancomycin. ESRD on HD - Per nephrology. Hypertension/Ischemic cardiomyopathy with EF 35% S/P AICD - Hold Aspirin and Beta blockers for now. Pulmonary hypertension - in the setting of ESRD and LVrEF. Will need RHC at some point to rule out concomitant WHO group 1 PH. DVT - was on Coumadin, which we will hold. Patient is critically ill with active hematemesis. High risk of cardiac arrest/aspiration pneumonia and hypoxic respiratory failure. [facility] time - 90 minutes (excluding procedures). Addendum  - Patient having worsening hematemesis and is at risk of aspiration.  Will go ahead and intubate, which will also expedite/facilitate EGD.

## 2021-12-06 NOTE — PROGRESS NOTES
Hospitalist Progress Note    NAME: Harsha Birch   :  1975   MRN:  [id number]       Assessment / Plan:    Acute blood loss anemia (Hg 6.1)  Hypotension (75/34)  -Vit K. Hold coumadin  -NS or albumin bolus prn  -needs blood. Ordered 2 units    CONSENT for Transfusion  I have discussed with the patient the rationale for blood transfusion, its benefits in treating or preventing symptomatic anemia which could lead to SOB, fatigue, organ damage (strokes or MI) or ultimately death, and its risk which include: mild transfusion reactions, rare risk of blood borne infection, or more serious but rare allergic reactions. I have discussed the risk and consequences of not receiving transfusion. The patient had an opportunity to ask questions and had agreed to proceed with transfusion of packed red blood cells. Hematemesis   Rising Lipase level  Lactic acidosis, resolved  Epigastric pain  -concern for gastric or duodenal ulcer. He initially complained of heartburn/epig pain that improved with GI cocktail. Added carafate to his PPI last week and that helped but his lipase continues to climb and this morning he vomited clots.   -change to IV protonix  -hold ASA  -consult GI stat  -NPO    Acute Covid 19 infection syndrome POA  Hypoxemia, resolved  Recent Streptococcus intermedius bacteremia (2021)  -onset of symptoms past Wednesday 3 days PTA, not vaccinated  -initially required only 2L, now off oxygen and >97% on RA  -Rapid Covid test positive  -Blood culture NGTD  -continue to dose IV Vanco prn. Follow random levels. Per last discharge summary, plan is to continue Vanco until Dec 6. Will ask ID for input given no clear explanation for his elevated procalcitonin - still waiting for ID input, will send Dr WALTON a text reminder  -Procalcitonin 26 on admission, repeat is 7.9.    -continue decadron.   Follow CRP and ferritin - trending down  -cefepime stopped (received -)  -should keep appointment with  Asif, oral surgery, on 12/23/21    Transaminitis  -CT abdomen:  Nothing acute  -Abd US Uniform liver. Absent gallbladder without biliary dilation. 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 Estimated discharge date: Nov 30, 2021  Barriers: COVID-19, Improvement of transaminitis         Subjective:     Chief Complaint / Reason for Physician Visit  Follow up elevated LFTs, elevated lipase, COVID, possible PNA vs CHF  No new issues    Review of Systems:  Symptom Y/N Comments  Symptom Y/N Comments   Fever/Chills    Chest Pain     Poor Appetite    Edema     Cough    Abdominal Pain     Sputum    Joint Pain     SOB/ZAMUDIO    Pruritis/Rash     Nausea/vomit    Tolerating PT/OT     Diarrhea    Tolerating Diet     Constipation    Other       Could NOT obtain due to:      Objective:     VITALS:   Last 24hrs VS reviewed since prior progress note.  Most recent are:  Patient Vitals for the past 24 hrs:   Temp Pulse Resp BP SpO2   12/06/21 1020  89  (!) 81/37 (!) 46 % 12/06/21 1019  92  (!) 82/39    12/06/21 1015  90  (!) 78/38    12/06/21 1013  93  (!) 75/34    12/06/21 1009  91  (!) 85/39    12/06/21 1007  96  (!) 68/38    12/06/21 1004  95  (!) 79/41    12/06/21 1003  92  (!) 78/45    12/06/21 1000  (!) 105  (!) 70/49    12/06/21 0953  99  (!) 103/52    12/06/21 0945  89  (!) 103/59    12/06/21 0943  89  (!) 109/59    12/06/21 0931  90  (!) 105/58    12/06/21 0619 98.2 °F (36.8 °C)       12/06/21 0219 100 °F (37.8 °C) 98 14 116/65 97 %   12/05/21 2312 99.6 °F (37.6 °C) 92 16 110/65 98 %   12/05/21 2023 98.5 °F (36.9 °C) 95 18 120/73 97 %   12/05/21 1632 98.8 °F (37.1 °C) 87 16 109/65 99 %   12/05/21 1330 98.9 °F (37.2 °C) 90 18 110/69 97 %       Intake/Output Summary (Last 24 hours) at 12/6/2021 1058  Last data filed at 12/5/2021 1630  Gross per 24 hour   Intake 840 ml   Output    Net 840 ml        I had a face to face encounter and independently examined this patient on 12/6/2021, as outlined below:  PHYSICAL EXAM:    General:          Alert, cooperative, no distress, appears stated age. HEENT:           Atraumatic, anicteric sclerae, pink conjunctivae                          No oral ulcers, mucosa moist, throat clear, dentition fair  Neck:               Supple, symmetrical,  thyroid: non tender  Lungs:             Clear to auscultation bilaterally. No Wheezing or Rhonchi. No rales. Chest wall:      No tenderness  No Accessory muscle use. Left chest wall AICD noted  Heart:              Regular  rhythm,  No  murmur   2+ LE edema bilaterally  Abdomen:        Soft, non-tender. Not distended. Bowel sounds normal  Extremities:     No cyanosis. No clubbing, left arm AV fistula noted with good thrill                          Skin turgor normal, Capillary refill normal, Radial dial pulse 2+  Skin:                Not pale. Not Jaundiced  No rashes   Psych:             Good insight. Not depressed.   Not anxious or agitated. Neurologic:      EOMs intact. No facial asymmetry. No aphasia or slurred speech. Symmetrical strength, Sensation grossly intact. Alert and oriented X 4. Reviewed most current lab test results and cultures  YES  Reviewed most current radiology test results   YES  Review and summation of old records today    NO  Reviewed patient's current orders and MAR    YES  PMH/SH reviewed - no change compared to H&P  ________________________________________________________________________  Care Plan discussed with:    Comments   Patient x    Family      RN x    Care Manager     Consultant                        Multidiciplinary team rounds were held today with , nursing, pharmacist and clinical coordinator. Patient's plan of care was discussed; medications were reviewed and discharge planning was addressed. ________________________________________________________________________  Total NON critical care TIME:    Minutes    Total CRITICAL CARE TIME Spent: 55   Minutes non procedure based  I have provided    55     minutes of critical care time. During this entire length of time I was immediately available to the patient. The reason for providing this level of medical care was due to a critical illness that impaired one or more vital organ systems, such that there was a high probability of imminent or life threatening deterioration in the patient's condition. This care involved high complexity decision making which includes reviewing the patient's past medical records, current laboratory results, and actual Xray films in order to assess, support vital system function, and to treat this degree of vital organ system failure, and to prevent further life threatening deterioration of the patients condition. I have also discussed this case with the involved ED physician and with the on call  Pulmonary Critical care physician.           Comments   >50% of visit spent in counseling and coordination of care     ________________________________________________________________________  Nayana Torres MD     Procedures: see electronic medical records for all procedures/Xrays and details which were not copied into this note but were reviewed prior to creation of Plan. LABS:  I reviewed today's most current labs and imaging studies.   Pertinent labs include:  Recent Labs     12/06/21  0939   WBC 10.8   HGB 6.1*   HCT 18.6*   PLT 87*     Recent Labs     12/06/21  0939 12/05/21  0831 12/03/21  1555   *  --  134*   K 3.7  --  3.8     --  100   CO2 27  --  26   *  --  172*   BUN 65*  --  49*   CREA 6.12*  --  5.91*   CA 8.4*  --  8.5   MG  --   --  2.5*   ALB  --   --  2.7*   TBILI  --   --  1.0   ALT  --   --  294*   INR 1.4* 1.4* 2.0*       Signed: Nayana Torres MD

## 2021-12-06 NOTE — PROGRESS NOTES
of Care Plan:     RUR:20% high risk  Disposition:home with [facility] [address]  Follow up appointments:PCP, oral surgeon-Dr Virginie Morgan, [phone]- scheduled for Dec 23rd @10:30am  DME needed:RW and shower chair  Transportation at [id number]  [facility] will transport   [place] or means to access home:     Yes   IM Medicare Letter: Will provide upon d/c   Is patient a BCPI-A Bundle:                      If yes, was Bundle Letter given?:     Caregiver Contact:Namrata jackson [phone]  Discharge Caregiver contacted prior to discharge? Initial note:  Chart reviewed. Pt accepted for [facility] by [facility] NEENA Rowe delivered to pt. CM to continue to monitor for d/c needs.     Loki Lancaster, MSN  Care Manager  [phone]

## 2021-12-06 NOTE — PROGRESS NOTES
GI CONSULTATION NOTE  Clifton Khan NP  [phone] NP in-hospital cell phone M-F until 4:30  After 5pm or on weekends, please call  for physician on call     I have spoke to Dr Dayan Ariza and Dr Mayur Cortez about patient. He was transferred to ICU and is now remaining hypotensive with blood per rectum, likely hypovolemic shock. He is now getting blood. I have set him up for EGD with Dr Hewitt Patient in [address] [place] [address] at about 1400 or 1430 today. Continue Pantoprazole gtt  Agree with blood transfusion and IVF. Dr Mayur Cortez aware of plan. Thank you,   Clifton Khan NP      Addendum [phone] : I personally spoke to sister, Silverio Peres, on the phone who provided consent with EGD and anesthesia. They are signed on charts.

## 2021-12-06 NOTE — ANESTHESIA POSTPROCEDURE EVALUATION
Post-Anesthesia Evaluation and Assessment    Patient: Everardo Hunt MRN: [id number]  SSN: [id number]    YOB: 1975  Age: 42 y.o. Sex: male      I have evaluated the patient and they are stable in the ICU. Cardiovascular Function/Vital Signs  Visit Vitals  /81   Pulse (!) 106   Temp 36.7 °C (98 °F)   Resp 12   Ht 5' 7\" (1.702 m)   Wt 65 kg (143 lb 4.8 oz)   SpO2 100%   BMI 22.44 kg/m²       Patient is status post MAC anesthesia for Procedure(s) with comments:  ESOPHAGOGASTRODUODENOSCOPY (EGD)  INJECTION - Injected  7.5ml of epinephrine into esophagus. Nausea/Vomiting: None    Postoperative hydration reviewed and adequate. Pain:  Pain Scale 1: Numeric (0 - 10) (12/06/21 0653)  Pain Intensity 1: 3 (12/06/21 3301)   Managed    Neurological Status:   Neuro  Neurologic State: Alert (12/06/21 1300)  Orientation Level: Oriented X4 (12/06/21 1300)  Cognition: Follows commands; Appropriate decision making; Appropriate safety awareness (12/06/21 1300)   Intubated and sedated     Pulmonary Status:   O2 Device: None (Room air) (12/06/21 0800)   Intubated with adequate ventilation and oxygenation     Complications related to anesthesia: None    Post-anesthesia assessment completed.  No concerns    Signed By: Rajesh Lund MD     December 6, 2021

## 2021-12-06 NOTE — PROGRESS NOTES
0730 Bedside shift change report given to [address] (oncoming nurse) by Terrance Morales RN (offgoing nurse). Report included the following information SBAR, Kardex, ED Summary, MAR, Recent Results and Cardiac Rhythm NSR/Sinus Tach.     0920 Patient vomited bloody emesis with clots. MD notified. 0930 GI consulted.

## 2021-12-06 NOTE — PROGRESS NOTES
End of Shift Note    Bedside shift change report given to [address]  (oncoming nurse) by Wilda Veloz (offgoing nurse). Report included the following information SBAR and Kardex  21 :  Pt refused all evening meds except for PRN pain medications. 0342:  Vanc trough not drawn. MD ordered labs with dialysis only as pt has refused further lab draws. Shift worked:  2812-5655     Shift summary and any significant changes:    See above   Concerns for physician to address:    Zone phone for oncoming shift:       Activity:  Activity Level: Up with Assistance  Number times ambulated in hallways past shift: 0  Number of times OOB to chair past shift: 0    Cardiac:   Cardiac Monitoring: Yes      Cardiac Rhythm: Sinus Rhythm    Access:   Current line(s): PIV     Genitourinary:   Urinary status: anuric    Respiratory:   O2 Device: None (Room air)  Chronic home O2 use?: NO  Incentive spirometer at bedside: N/A     GI:  Last Bowel Movement Date: 12/05/21  Current diet:  ADULT DIET Regular; Low Fat/Low Chol/High Fiber/2 gm Na; Low Potassium (Less than 3000 mg/day); Low Phosphorus (Less than 1000 mg)  DIET ONE TIME MESSAGE  ADULT ORAL NUTRITION SUPPLEMENT Dinner, Lunch; Clear Liquid  ADULT ORAL NUTRITION SUPPLEMENT Breakfast, Lunch; Renal Supplement  Passing flatus: YES  Tolerating current diet: YES       Pain Management:   Patient states pain is manageable on current regimen: NO    Skin:  Yong Score: 17  Interventions: increase time out of bed    Patient Safety:  Fall Score:  Total Score: 4  Interventions: gripper socks  High Fall Risk: Yes    Length of Stay:  Expected LOS: 5d 9h  Actual LOS:   [facility]

## 2021-12-06 NOTE — PROGRESS NOTES
RAPID RESPONSE TEAM    Responded to overhead rapid response to Room #   2516/01 for hypotension     Upon my arrival patient is vomiting bright red blood with clots, approx 300cc in basin, BP 70/43, bed is soaked in dark red blood with large clot from waist down. Unable to place pt in trendelenburg d/t vomiting. zofran 4 mg given, 250 ml NS bolus ordered per protocol, albumin 25g infusing. Discussed with Dr. Pamula Dakins, will consult CCU and d/w GI. Upon arrival to CCU, addtl 200cc bright red emesis in bag, total 500cc.     Lab Results   Component Value Date/Time    WBC 10.8 12/06/2021 09:39 AM    Hemoglobin (POC) 10.5 (L) 11/14/2017 07:00 AM    HGB 6.1 (L) 12/06/2021 09:39 AM    Hematocrit (POC) 36 (L) 09/08/2020 06:46 AM    HCT 18.6 (L) 12/06/2021 09:39 AM    PLATELET 87 (L) 14/01/9360 09:39 AM    MCV 95.9 12/06/2021 09:39 AM     Patient Vitals for the past 8 hrs:   Temp Pulse Resp BP SpO2   12/06/21 1433 98 °F (36.7 °C) (!) 105 20 (!) 84/50 100 %   12/06/21 1430  (!) 105 25 (!) 92/58 100 %   12/06/21 1418 97.5 °F (36.4 °C) (!) 105 21 (!) 92/58 100 %   12/06/21 1403 97.8 °F (36.6 °C) (!) 120 18 108/64 100 %   12/06/21 1350 98 °F (36.7 °C) (!) 118 18 91/61 100 %   12/06/21 1330 98.4 °F (36.9 °C) (!) 105 19 (!) 105/59 100 %   12/06/21 1315  (!) 110 24 (!) 95/42    12/06/21 1305 98.4 °F (36.9 °C) (!) 106 23 (!) 96/45 100 %   12/06/21 1300  (!) 105  (!) 96/45 100 %   12/06/21 1244  97  (!) 97/56 94 %   12/06/21 1235  98  (!) 70/43    12/06/21 1230  (!) 101  (!) 79/43 94 %   12/06/21 1115    (!) 69/45    12/06/21 1105  97  (!) 77/47    12/06/21 1100  (!) 108  (!) 74/44    12/06/21 1050  (!) 102  (!) 96/55    12/06/21 1045  (!) 116 14 (!) 88/51    12/06/21 1035  96  92/61 100 %   12/06/21 1030  90 14 (!) 105/59    12/06/21 1020  89  (!) 81/37 (!) 46 %   12/06/21 1019  92  (!) 82/39    12/06/21 1015  90 14 (!) 78/38 96 %   12/06/21 1013  93  (!) 75/34    12/06/21 1009  91 14 (!) 85/39  12/06/21 1007  96 14 (!) 68/38    12/06/21 1004  95  (!) 79/41    12/06/21 1003  92  (!) 78/45    12/06/21 1000  (!) 105 14 (!) 70/49    12/06/21 0953  99  (!) 103/52    12/06/21 0945  89  (!) 103/59    12/06/21 0943  89  (!) 109/59    12/06/21 0934 98.4 °F (36.9 °C) 100 14 (!) 103/59 97 %   12/06/21 0931  90  (!) 105/58           Patient transferred to CCU 2516. Bedside report given by primary nurse. Intensivist at bedside placing central line.       Nicholas Reyes RN  Rapid Response Team  Ext. [phone]

## 2021-12-06 NOTE — PROCEDURES
[address] [place] [address] [facility]  Pulmonary, Critical Care, and Sleep Medicine    Name: Sanjuana Velazco MRN: [id number]   : 1975 Hospital: [facility]   Date: 2021        Intubation Note    Procedure: eemergent intubation    Indication: respiratory insufficiency    Anesthesia- Rapid sequence:   Etomidate 20 mg   Paralysis: Rocuronium 50 mg    After assessing the airway, the patient underwent preoxygenation with 100% FiO2 for 5 min. Etomidate and succnylcholine was given sequentially in rapid IV push. After adequate sedation and paralisys emergent intubation was performed. A 4.0MAC, blade video laryngoscope was used to visualize the epiglottis and vocal chords. After positive identification of the vocal cords, a 7.5 ET tube was placed into the trachea with direct visualization. The tube was seen passing through the vocal chords without difficulty. CO2 colorimetry was employed immediately to verify tube in airway with appropriate color change indicating detection of CO2. Water vapor was seen within the ET tube, and auscultation of the abdomen revealed no bubbling souds. Auscultation  and inspection of the chest after intubation showed symmetric chest excursion and symmetric air entry bilaterally. Chest X-ray has been ordered and is pending. The patient has been placed on a mechanical ventilator. There were no complications.     Ivonne Rouse MD

## 2021-12-06 NOTE — PROGRESS NOTES
Updated and discussed patient with Dr. Maria Fernanda Zapata. Levo at 20mcg. Olegario drip at 70mcg. Dr. Maria Fernanda Zapata updated that Dr. Brandon Fernandez did not want OG tube replaced due to irritation in esophagus. Per Dr. Maria Fernanda Zapata, continue to administer the ordered blood products and support patients BP with levo and olegario. Parameter on Olegario drip increased to 300mcg/min. Patient continues to ooze blood from his mouth and nares. 1930 Reviewed charting by RAINER Carty RN.

## 2021-12-06 NOTE — PROGRESS NOTES
participated in [address] [place] [address] where pt was discussed.    Lupe Dawson M.Div, [facility]   Paging Service [phone]

## 2021-12-06 NOTE — PROGRESS NOTES
Problem:  Body Temperature -  Risk of, Imbalanced  Goal: Ability to maintain a body temperature within defined limits  Outcome: Progressing Towards Goal  Goal: Will regain or maintain usual level of consciousness  Outcome: Progressing Towards Goal  Goal: Complications related to the disease process, condition or treatment will be avoided or minimized  Outcome: Progressing Towards Goal

## 2021-12-06 NOTE — CONSULTS
GI CONSULTATION NOTE  Jorje Chanel, NP  [phone] NP in-hospital cell phone M-F until 4:30  After 5pm or on weekends, please call  for physician on call    NAME: Cresencio Turpin   :  1975   MRN:  [id number]   Attending:  Dr. Shraddha Lyons  Primary GI:  Dr Aliya Alarcon - Dr Gloriajean Aschoff covering  Date/Time:  2021 11:33 AM  Assessment:   Acute onset of Hematemesis  Acute Blood Loss anemia - hgb 6.1  Chronic thrombocytopenia - plt 80s  On Warfarin and ASA 81mg - last dose 21 PM  - Sudden onset today at 0900 with about 100-200cc of BRB in emesis, clot per nursing  - Now complaining of significant nausea and abdominal pain, per nursing has chronic pain and has been asking for pain meds around the clock for days, unable to tell if acute change in pain  -  - Hgb 9.7, had been refusing labs - repeat hgb today 21 - 6.1  - INR 1.4  - Some hypotension during dialysis  - Previously complained of heartburn but improved with GI cocktail  - EGD 2012 with Dr Aura Landers : negative exam    CT abd/pel W contrast 21 : 1. Relatively small right pleural effusion and minimal left pleural effusion,  increased since 2021.  2. No acute findings in the abdomen or pelvis. .   3. Renal atrophy and calcified transplant kidneys. 4. Hepatomegaly. No focal lesion. 5. Small amount of ascites. Anasarca. Bacteremia  COVID+ -  Admitted 21  ESRD on dialysis  Chronic pain      Plan:   1. No plan for EGD today, possibly tomorrow  2. Need blood prior to EGD - transfuse for Hgb goal of 7.0 or greater  3. Continue BID PPI  4. Ordered reglan 10mg once now  5. Hold coumadin and ASA if possible  6. Received vitamin k 10mg SQ - agree with this  7. Unable to complete CTA abdomen to check for acute bleed due to iodine contrast allergy. 8. Follow H/H   9. Rest per primary team.     Plan discussed with Dr Gloriajean Aschoff. Thank you for consultation.    Subjective:     HISTORY OF PRESENT ILLNESS:     Cresencio Turpin is an 42 y. o. AA male who we are asked to see for complaint of hematemesis. Extensive medical history including CAD s/p stents, ERSD on dialysis, HTN, SBO s/p surgery in 2011. Pt admitted to hospital 11/27/21 for COVID + and bacteremia. Pt seemed to be stable but was having some transaminitis thought to be 2/2 COVID with negative US and hypoxia resolved, then unfortunately today, he began having hematemesis with clot at 0900. Previously during hospitalization was havign heartburtn that improved with GI cocktail, carafate, and BID PPI. Now complaining of severe nausea with vomiting without improvement from zofran. Pt has been refusing medications and labs, thus last hgb 12/1 was 9.7, repeat now was 6.1. Pt on coumadin (last dose 12/5/21 PM) and ASA 81mg daily. INR 1.4 this AM.     Last EGD 2012 was unremarkable by Dr Mckenna Paiz. Past Medical History:   Diagnosis Date    Abdominal hematoma     AICD (automatic cardioverter/defibrillator) present     CAD (coronary artery disease)     anterior MI s/p 2 stents  2007    Chronic abdominal pain     Chronic kidney disease     ESRD; Dialysis dependent.  Home [facility] does labs- [facility] tpke    DVT (deep venous thrombosis) ([address] 2001    DVT of popliteal vein ([address] 11/2011    not anticoagulated due to bleeding, IVC filter    Endocarditis     Gallstone pancreatitis     Gastrointestinal disorder     acid reflux    Gastrointestinal disorder     peptic ulcer    Hemodialysis patient ([address]     High cholesterol     Hypertension     Kidney transplant     b/l kidneys 1995, 2001    Long term current use of anticoagulant therapy     Nephrotic syndrome     Other ill-defined conditions(799.89)     kidny transplant x2,  on dialysis    Other ill-defined conditions(799.89)     home dialysis    Other ill-defined conditions(799.89)     hx recurrent left leg DVT    Peritonitis ([address]     Seizures ([address] 2015    most recent- only had three in lifetime    Small bowel obstruction [facility]     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[facility]     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)       Past Surgical History:   Procedure Laterality Date    HX APPENDECTOMY      HX CHOLECYSTECTOMY      HX OTHER SURGICAL  11/2011    exploratory laparotomy    HX OTHER SURGICAL      fem-pop    HX OTHER SURGICAL  02/2013    right upper extremity fistula    HX PACEMAKER Left 6/2009    AICD-st latonya-not connected    HX PACEMAKER Left     AICD-left side-BS-working    HX SMALL BOWEL RESECTION      HX TRANSPLANT  2001     Kidney    HX TRANSPLANT  1992    kidney    HX VASCULAR ACCESS Right     femoral access for HD- does 5 day dialysis @ home    IR REMOVE TUNL CVAD W/O PORT / PUMP  10/29/2020    IR REPLACE CVC TUNNELED W/O PORT  9/10/2020    NM CARDIAC SURG PROCEDURE UNLIST  2007    2 stents    NM EDG US EXAM SURGICAL ALTER STOM DUODENUM/JEJUNUM  7/15/2011         NM ESOPHAGOGASTRODUODENOSCOPY TRANSORAL DIAGNOSTIC  5/24/2012         VASCULAR SURGERY PROCEDURE UNLIST  11/14/2017    Insertion AV graft right upper arm (bovine); ligation of AV fistula right arm     Social History     Tobacco Use    Smoking status: Never Smoker    Smokeless tobacco: Never Used   Substance Use Topics    Alcohol use: No      Family History   Problem Relation Age of Onset    COPD Mother     Lung Disease Mother     Cancer Father         stomach    Cancer Maternal Grandmother       Allergies   Allergen Reactions    Shellfish Containing Products Swelling     Break out in rash, trouble breathing    Contrast Agent [Iodine] Shortness of Breath    Heparin Analogues Other (comments)     Positive history of HIT      Prior to Admission medications    Medication Sig Start Date End Date Taking? Authorizing Provider   vancomycin in 0.9% sodium chloride (VANCOCIN) 1 gram/200 mL pgbk 150 mL by IntraVENous route every Monday, Wednesday, Friday for 8 doses.  After hemodialysis treatment 11/19/21 12/7/21  Jackson Aguayo MD   ondansetron (Zofran ODT) 4 mg disintegrating tablet 1 Tab by SubLINGual route every eight (8) hours as needed for Nausea or Vomiting. 3/27/21   Santi Jimenez MD   metoprolol succinate (TOPROL-XL) 25 mg XL tablet Take 0.5 Tabs by mouth daily. 11/10/20   Jos Gale MD   calcium acetate,phosphat bind, (PHOSLO) 667 mg cap Take 2 Caps by mouth three (3) times daily (with meals). Indications: low amount of calcium in the blood, renal osteodystrophy with hyperphosphatemia 11/10/20   Jos Gale MD   atorvastatin (Lipitor) 20 mg tablet Take 20 mg by mouth daily. Provider, Historical   warfarin (COUMADIN) 2.5 mg tablet Take 2.5 mg by mouth daily. Provider, Historical   acetaminophen (TYLENOL) 500 mg tablet Take 1 Tab by mouth every four (4) hours as needed for Pain. Over the counter 1/20/19   Taz Thomas MD   omeprazole (PRILOSEC) 20 mg capsule Take 20 mg by mouth daily. Provider, Historical   calcitRIOL (ROCALTROL) 0.5 mcg capsule Take 0.5 mcg by mouth daily. Provider, Historical   aspirin 81 mg chewable tablet Take 81 mg by mouth daily.     Other, MD Morro       Patient Active Problem List   Diagnosis Code    Kidney transplant     Coronary atherosclerosis of native coronary artery I25.10    Nausea & vomiting R11.2    Serum total bilirubin elevated R17    Abdominal pain R10.9    HTN (hypertension) I10    Anemia D64.9    S/P appendectomy Z90.49    High cholesterol E78.00    Other ill-defined conditions(799.89) R69    CAD (coronary artery disease) I25.10    Gastrointestinal disorder K92.9    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HCC) D69.6    Peritonitis ([facility] K65.9    Malnutrition ([facility] E46    DVT (deep venous thrombosis) (HCC) I82.409    S/P IVC filter Z95.828    Arterial occlusion I70.90    S/p cadaver renal transplant Z94.0    AICD (automatic cardioverter/defibrillator) present Z95.810    ESRD (end stage renal disease) on dialysis ([facility] N18.6, Z99.2    Dialysis patient ([facility] Z99.2    Congestive heart failure, unspecified I50.9    Hypoglycemia E16.2    Sepsis ([facility]) A41.9    Pneumonia J18.9    Abnormal EKG R94.31    ESRD (end stage renal disease) ([facility]) N18.6    Leucocytosis D72.829    Gram-positive bacteremia R78.81    Fever R50.9    Acute COVID-19 U07.1       REVIEW OF SYSTEMS:    Constitutional: negative fever, negative chills, negative weight loss  Eyes:   negative visual changes  ENT:   negative sore throat, tongue or lip swelling   Respiratory:  negative cough, negative dyspnea  Cards:  negative for chest pain, palpitation  GI:   See HPI  :  negative for frequency, dysuria  Integument:  negative for rash and pruritus  Heme:  negative for easy bruising and gum/nose bleeding  Musculoskel: negative for myalgias  Neuro: negative for headaches, dizziness, vertigo  Psych:  negative for feelings of anxiety, depression     Pertinent Positives: nausea, vomiting, diffuse pain, anxious, restless      Objective:   VITALS:    Visit Vitals  BP 92/61   Pulse 96   Temp 98.2 °F (36.8 °C)   Resp 14   Ht 5' 7\" (1.702 m)   Wt 65 kg (143 lb 4.8 oz)   SpO2 100%   BMI 22.44 kg/m²       PHYSICAL EXAM:   General:          Alert, WD, WN, cooperative, acute distress, appears stated age. Head:               Normocephalic, without obvious abnormality, atraumatic. Eyes:               Conjunctivae clear and pale, anicteric sclerae. Pupils are equal  Nose:               Nares normal.   Throat:             Lips, mucosa, and tongue normal.  Neck:               Supple, symmetrical,  no adenopathy  Lungs:             Deferred due to COVID  Heart:              Deferred due to COVID  Abdomen:        Deferred due to COVID  Extremities:     Atraumatic, No cyanosis. Skin:                Texture, turgor normal. No rashes/lesions/jaundice  Lymph:            Cervical, supraclavicular normal.  Psych:             Good insight. Anxious/agitated  Neurologic:      EOMs intact. No facial asymmetry. No aphasia or slurred speech.  Normal strength, A/O X 3. LAB DATA REVIEWED:    Recent Results (from the past 24 hour(s))   PROTHROMBIN TIME + INR    Collection Time: 12/06/21  9:39 AM   Result Value Ref Range    INR 1.4 (H) 0.9 - 1.1      Prothrombin time 14.8 (H) 9.0 - 11.1 sec   VANCOMYCIN, RANDOM    Collection Time: 12/06/21  9:39 AM   Result Value Ref Range    Vancomycin, random 19.3 UG/ML   PROCALCITONIN    Collection Time: 12/06/21  9:39 AM   Result Value Ref Range    Procalcitonin 5.95 ng/mL   LIPASE    Collection Time: 12/06/21  9:39 AM   Result Value Ref Range    Lipase 1,177 (H) 73 - 572 U/L   METABOLIC PANEL, BASIC    Collection Time: 12/06/21  9:39 AM   Result Value Ref Range    Sodium 135 (L) 136 - 145 mmol/L    Potassium 3.7 3.5 - 5.1 mmol/L    Chloride 100 97 - 108 mmol/L    CO2 27 21 - 32 mmol/L    Anion gap 8 5 - 15 mmol/L    Glucose 104 (H) 65 - 100 mg/dL    BUN 65 (H) 6 - 20 MG/DL    Creatinine 6.12 (H) 0.70 - 1.30 MG/DL    BUN/Creatinine ratio 11 (L) 12 - 20      GFR est AA 12 (L) >60 ml/min/1.73m2    GFR est non-AA 10 (L) >60 ml/min/1.73m2    Calcium 8.4 (L) 8.5 - 10.1 MG/DL   CBC W/O DIFF    Collection Time: 12/06/21  9:39 AM   Result Value Ref Range    WBC 10.8 4.1 - 11.1 K/uL    RBC 1.94 (L) 4.10 - 5.70 M/uL    HGB 6.1 (L) 12.1 - 17.0 g/dL    HCT 18.6 (L) 36.6 - 50.3 %    MCV 95.9 80.0 - 99.0 FL    MCH 31.4 26.0 - 34.0 PG    MCHC 32.8 30.0 - 36.5 g/dL    RDW 23.7 (H) 11.5 - 14.5 %    PLATELET 87 (L) 481 - 400 K/uL    MPV 12.9 8.9 - 12.9 FL    NRBC 0.7 (H) 0  WBC    ABSOLUTE NRBC 0.07 (H) 0.00 - 0.01 K/uL       IMAGING RESULTS:  I have personally reviewed the imaging reports    DATE: 11/27/21    EXAMINATION:  CT ABD PELV W CONT  INDICATION:  Patient is on dialysis, severely elevated lactate, abdominal pain,  transaminitis  COMPARISON: 11/5/2021.   CONTRAST: 100 mL of Isovue-370.     TECHNIQUE:   Multislice helical CT was performed from the diaphragm to the symphysis pubis  during uneventful rapid bolus intravenous contrast administration. Oral contrast  was not administered. Axial images were acquired. Lung and soft tissue windows  were generated. Coronal and sagittal reformations were generated. CT dose  reduction was achieved through use of a standardized protocol tailored for this  examination and automatic exposure control for dose modulation.     FINDINGS:  LOWER CHEST: Small to moderate right pleural effusion, increased since the  previous study. Minimal left pleural effusion, also increased since the previous  study.     LIVER: Diffuse enlargement. Heterogeneous enhancement which is likely due to the  patient's reduced cardiac output. No focal lesion. GALLBLADDER: Previous cholecystectomy. No biliary dilatation. SPLEEN: No mass. Normal size. PANCREAS: No mass or ductal dilatation. ADRENALS: Unremarkable. KIDNEYS: Marked atrophy of the native kidneys which demonstrate extensive  vascular calcification. Calcified transplant kidneys in the iliac fossa  bilaterally. STOMACH: Unremarkable. SMALL BOWEL: No dilatation or wall thickening. COLON: Mildly distended with fluid. Nonobstructive pattern. APPENDIX: Not seen. PERITONEUM: Trace of ascites. No focal fluid collections. RETROPERITONEUM: No lymphadenopathy or aortic aneurysm. REPRODUCTIVE ORGANS: Unremarkable  URINARY BLADDER: Nondistended  PELVIC LYMPH NODES: None enlarged.      BONES: Renal osteodystrophy. .  ADDITIONAL COMMENTS:  N/A     IMPRESSION  1. Relatively small right pleural effusion and minimal left pleural effusion,  increased since 11/5/2021.  2. No acute findings in the abdomen or pelvis. .   3. Renal atrophy and calcified transplant kidneys. 4. Hepatomegaly. No focal lesion. 5. Small amount of ascites.  Anasarca.     Total time spent with patient: 50 minutes ________________________________________________________________________  Care Plan discussed with:  Patient y   Family     RN karishma Shabazz, MANUELITO bedside and dialysis RN Consultant:       CT  12/6/2021:  ________________________________________________________________________    ___________________________________________________  Consulting Provider:  Shiv Escobar NP      12/6/2021  11:33 AM

## 2021-12-06 NOTE — PROGRESS NOTES
Pharmacy Antimicrobial Kinetic Dosing    Indication for Antimicrobials: bloodstream infection - hx of recent strep intermedius bacteremia ; Covid-19    Current Regimen of Each Antimicrobial:  Vancomycin 1250 mg after HD (Start Date ; Day 10)    Previous Antimicrobial Therapy:  Cefepime 1 g IV q24h (-)  Vancomycin 750 mg Mon, Wed, Fri after HD    Goal Level: VANCOMYCIN TROUGH GOAL RANGE    Vancomycin Trough: 20 - 25 mcg/mL    Date Dose & Interval Measured (mcg/mL) Predicted AUC/ZECHARIAH    750 mg post-HD 19     1000 mg post-HD 19.3            Date & time of next level:     Significant Positive Cultures:    blood: NG - Final    Labs:  Recent Labs     21  0939 21  1555   CREA 6.12* 5.91*   BUN 65* 49*   PCT 5.95 4.22     Recent Labs     21  0939   WBC 10.8       Temp (24hrs), Av.9 °F (37.2 °C), Min:98.2 °F (36.8 °C), Max:100 °F (37.8 °C)    Conditions for Dosing Consideration: ESRD on HD MWF    Impression/Plan:   Vancomycin random level still low at 19.3. Increase to 1250 mg after each HD session  Will give final dose of 1250 mg x1 today  Antimicrobial stop date today     Pharmacy will follow daily and adjust medications as appropriate for renal function and/or serum levels.     Thank you,  Renita Rasmussen, CHIQUITAD

## 2021-12-06 NOTE — PROGRESS NOTES
Spiritual Care Assessment/Progress Note  [facility]      NAME: Mari Blevins      MRN: [id number]  AGE: 42 y.o. SEX: male  Mandaeism Affiliation: Latter-day   Language: English     12/6/2021     Total Time (in minutes): 7     Spiritual Assessment begun in [facility] through conversation with:         [x]Patient        [] Family    [] Friend(s)        Reason for Consult: Rapid response team     Spiritual beliefs: (Please include comment if needed)     [] Identifies with a brown tradition:         [] Supported by a brown community:            [] Claims no spiritual orientation:           [] Seeking spiritual identity:                [] Adheres to an individual form of spirituality:           [x] Not able to assess:                           Identified resources for coping:      [] Prayer                               [] Music                  [] Guided Imagery     [] Family/friends                 [] Pet visits     [] Devotional reading                         [x] Unknown     [] Other:                                               Interventions offered during this visit: (See comments for more details)    Patient Interventions: Other (comment) (unable to assess)           Plan of Care:     [] Support spiritual and/or cultural needs    [] Support AMD and/or advance care planning process      [] Support grieving process   [] Coordinate Rites and/or Rituals    [] Coordination with community clergy   [] No spiritual needs identified at this time   [] Detailed Plan of Care below (See Comments)  [] Make referral to Music Therapy  [] Make referral to Pet Therapy     [] Make referral to Addiction services  [] Make referral to [facility]  [] Make referral to Spiritual Care Partner  [] No future visits requested        [x] Contact Spiritual Care for further referrals     Comments:  responded to page for RRT. Staff were attending to patient, no family present.  Contact spiritual care with further referrals for support. Visited by: Christian Presser.    Paging Service: [phone])

## 2021-12-06 NOTE — PROGRESS NOTES
Occupational Therapy    Chart reviewed and spoke with nursing. Per nursing pt hgb trending down (9.7 > 6.1) and has had multiple episodes of bloody emesis, awaiting GI consult. Will defer until pt is medically stable and continue to follow.     Redmond Epley, OT

## 2021-12-07 ENCOUNTER — APPOINTMENT (OUTPATIENT)
Dept: CT IMAGING | Age: 46
DRG: 207 | End: 2021-12-07
Attending: NURSE PRACTITIONER
Payer: MEDICARE

## 2021-12-07 ENCOUNTER — HOSPITAL ENCOUNTER (OUTPATIENT)
Dept: INTERVENTIONAL RADIOLOGY/VASCULAR | Age: 46
Discharge: HOME | DRG: 207 | End: 2021-12-07
Attending: INTERNAL MEDICINE
Payer: MEDICARE

## 2021-12-07 ENCOUNTER — APPOINTMENT (OUTPATIENT)
Dept: GENERAL RADIOLOGY | Age: 46
DRG: 207 | End: 2021-12-07
Attending: INTERNAL MEDICINE
Payer: MEDICARE

## 2021-12-07 LAB
ANION GAP SERPL CALC-SCNC: 17 MMOL/L (ref 5–15)
ANION GAP SERPL CALC-SCNC: 19 MMOL/L (ref 5–15)
ATRIAL RATE: 81 BPM
BLD PROD TYP BPU: NORMAL
BPU ID: NORMAL
BUN SERPL-MCNC: 71 MG/DL (ref 6–20)
BUN SERPL-MCNC: 73 MG/DL (ref 6–20)
BUN/CREAT SERPL: 11 (ref 12–20)
BUN/CREAT SERPL: 12 (ref 12–20)
CALCIUM SERPL-MCNC: 7.2 MG/DL (ref 8.5–10.1)
CALCIUM SERPL-MCNC: 7.2 MG/DL (ref 8.5–10.1)
CALCULATED P AXIS, ECG09: 75 DEGREES
CALCULATED R AXIS, ECG10: 52 DEGREES
CALCULATED T AXIS, ECG11: 99 DEGREES
CHLORIDE SERPL-SCNC: 102 MMOL/L (ref 97–108)
CHLORIDE SERPL-SCNC: 99 MMOL/L (ref 97–108)
CO2 SERPL-SCNC: 19 MMOL/L (ref 21–32)
CO2 SERPL-SCNC: 21 MMOL/L (ref 21–32)
CREAT SERPL-MCNC: 6.18 MG/DL (ref 0.7–1.3)
CREAT SERPL-MCNC: 6.24 MG/DL (ref 0.7–1.3)
DIAGNOSIS, 93000: NORMAL
ERYTHROCYTE [DISTWIDTH] IN BLOOD BY AUTOMATED COUNT: 16.4 % (ref 11.5–14.5)
ERYTHROCYTE [DISTWIDTH] IN BLOOD BY AUTOMATED COUNT: 17.2 % (ref 11.5–14.5)
ERYTHROCYTE [DISTWIDTH] IN BLOOD BY AUTOMATED COUNT: 17.4 % (ref 11.5–14.5)
ERYTHROCYTE [DISTWIDTH] IN BLOOD BY AUTOMATED COUNT: 18.8 % (ref 11.5–14.5)
GLUCOSE SERPL-MCNC: 161 MG/DL (ref 65–100)
GLUCOSE SERPL-MCNC: 96 MG/DL (ref 65–100)
HCT VFR BLD AUTO: 14.4 % (ref 36.6–50.3)
HCT VFR BLD AUTO: 21.1 % (ref 36.6–50.3)
HCT VFR BLD AUTO: 22.7 % (ref 36.6–50.3)
HCT VFR BLD AUTO: 24.4 % (ref 36.6–50.3)
HGB BLD-MCNC: 4.8 G/DL (ref 12.1–17)
HGB BLD-MCNC: 7.1 G/DL (ref 12.1–17)
HGB BLD-MCNC: 7.5 G/DL (ref 12.1–17)
HGB BLD-MCNC: 8.2 G/DL (ref 12.1–17)
HISTORY CHECKED?,CKHIST: NORMAL
HISTORY CHECKED?,CKHIST: NORMAL
INR PPP: 1.6 (ref 0.9–1.1)
LACTATE SERPL-SCNC: 9.1 MMOL/L (ref 0.4–2)
LACTATE SERPL-SCNC: 9.2 MMOL/L (ref 0.4–2)
MCH RBC QN AUTO: 28.8 PG (ref 26–34)
MCH RBC QN AUTO: 29.3 PG (ref 26–34)
MCH RBC QN AUTO: 29.3 PG (ref 26–34)
MCH RBC QN AUTO: 30.2 PG (ref 26–34)
MCHC RBC AUTO-ENTMCNC: 33 G/DL (ref 30–36.5)
MCHC RBC AUTO-ENTMCNC: 33.3 G/DL (ref 30–36.5)
MCHC RBC AUTO-ENTMCNC: 33.6 G/DL (ref 30–36.5)
MCHC RBC AUTO-ENTMCNC: 33.6 G/DL (ref 30–36.5)
MCV RBC AUTO: 87.1 FL (ref 80–99)
MCV RBC AUTO: 87.2 FL (ref 80–99)
MCV RBC AUTO: 87.3 FL (ref 80–99)
MCV RBC AUTO: 90.6 FL (ref 80–99)
NRBC # BLD: 0.29 K/UL (ref 0–0.01)
NRBC # BLD: 0.29 K/UL (ref 0–0.01)
NRBC # BLD: 0.4 K/UL (ref 0–0.01)
NRBC # BLD: 0.51 K/UL (ref 0–0.01)
NRBC BLD-RTO: 1.2 PER 100 WBC
NRBC BLD-RTO: 1.4 PER 100 WBC
NRBC BLD-RTO: 2.1 PER 100 WBC
NRBC BLD-RTO: 3.6 PER 100 WBC
P-R INTERVAL, ECG05: 154 MS
PLATELET # BLD AUTO: 47 K/UL (ref 150–400)
PLATELET # BLD AUTO: 69 K/UL (ref 150–400)
PLATELET # BLD AUTO: 78 K/UL (ref 150–400)
PLATELET # BLD AUTO: 84 K/UL (ref 150–400)
POTASSIUM SERPL-SCNC: 4.9 MMOL/L (ref 3.5–5.1)
POTASSIUM SERPL-SCNC: 5.2 MMOL/L (ref 3.5–5.1)
PROCALCITONIN SERPL-MCNC: 66.39 NG/ML
PROTHROMBIN TIME: 15.9 SEC (ref 9–11.1)
Q-T INTERVAL, ECG07: 486 MS
QRS DURATION, ECG06: 100 MS
QTC CALCULATION (BEZET), ECG08: 564 MS
RBC # BLD AUTO: 1.59 M/UL (ref 4.1–5.7)
RBC # BLD AUTO: 2.42 M/UL (ref 4.1–5.7)
RBC # BLD AUTO: 2.6 M/UL (ref 4.1–5.7)
RBC # BLD AUTO: 2.8 M/UL (ref 4.1–5.7)
SODIUM SERPL-SCNC: 137 MMOL/L (ref 136–145)
SODIUM SERPL-SCNC: 140 MMOL/L (ref 136–145)
STATUS OF UNIT,%ST: NORMAL
UNIT DIVISION, %UDIV: 0
VENTRICULAR RATE, ECG03: 81 BPM
WBC # BLD AUTO: 14 K/UL (ref 4.1–11.1)
WBC # BLD AUTO: 19.4 K/UL (ref 4.1–11.1)
WBC # BLD AUTO: 21.2 K/UL (ref 4.1–11.1)
WBC # BLD AUTO: 25.1 K/UL (ref 4.1–11.1)

## 2021-12-07 PROCEDURE — 74011250637 HC RX REV CODE- 250/637: Performed by: INTERNAL MEDICINE

## 2021-12-07 PROCEDURE — C1769 GUIDE WIRE: HCPCS

## 2021-12-07 PROCEDURE — 90935 HEMODIALYSIS ONE EVALUATION: CPT

## 2021-12-07 PROCEDURE — 65610000006 HC RM INTENSIVE CARE

## 2021-12-07 PROCEDURE — 5A1D70Z PERFORMANCE OF URINARY FILTRATION, INTERMITTENT, LESS THAN 6 HOURS PER DAY: ICD-10-PCS | Performed by: INTERNAL MEDICINE

## 2021-12-07 PROCEDURE — 74011000250 HC RX REV CODE- 250: Performed by: NURSE PRACTITIONER

## 2021-12-07 PROCEDURE — 80048 BASIC METABOLIC PNL TOTAL CA: CPT

## 2021-12-07 PROCEDURE — 74011250636 HC RX REV CODE- 250/636: Performed by: INTERNAL MEDICINE

## 2021-12-07 PROCEDURE — P9047 ALBUMIN (HUMAN), 25%, 50ML: HCPCS | Performed by: INTERNAL MEDICINE

## 2021-12-07 PROCEDURE — 74178 CT ABD&PLV WO CNTR FLWD CNTR: CPT

## 2021-12-07 PROCEDURE — 77030002996 HC SUT SLK J&J -A

## 2021-12-07 PROCEDURE — 84145 PROCALCITONIN (PCT): CPT

## 2021-12-07 PROCEDURE — 83605 ASSAY OF LACTIC ACID: CPT

## 2021-12-07 PROCEDURE — 74011000250 HC RX REV CODE- 250: Performed by: INTERNAL MEDICINE

## 2021-12-07 PROCEDURE — 74011250636 HC RX REV CODE- 250/636: Performed by: NURSE PRACTITIONER

## 2021-12-07 PROCEDURE — 74018 RADEX ABDOMEN 1 VIEW: CPT

## 2021-12-07 PROCEDURE — 74011000258 HC RX REV CODE- 258: Performed by: INTERNAL MEDICINE

## 2021-12-07 PROCEDURE — 85610 PROTHROMBIN TIME: CPT

## 2021-12-07 PROCEDURE — 86644 CMV ANTIBODY: CPT

## 2021-12-07 PROCEDURE — P9073 PLATELETS PHERESIS PATH REDU: HCPCS

## 2021-12-07 PROCEDURE — 2709999900 HC NON-CHARGEABLE SUPPLY

## 2021-12-07 PROCEDURE — 85027 COMPLETE CBC AUTOMATED: CPT

## 2021-12-07 PROCEDURE — 74011000636 HC RX REV CODE- 636: Performed by: INTERNAL MEDICINE

## 2021-12-07 PROCEDURE — 36415 COLL VENOUS BLD VENIPUNCTURE: CPT

## 2021-12-07 PROCEDURE — 36430 TRANSFUSION BLD/BLD COMPNT: CPT

## 2021-12-07 PROCEDURE — 90945 DIALYSIS ONE EVALUATION: CPT

## 2021-12-07 PROCEDURE — C1892 INTRO/SHEATH,FIXED,PEEL-AWAY: HCPCS

## 2021-12-07 PROCEDURE — 74011000258 HC RX REV CODE- 258: Performed by: NURSE PRACTITIONER

## 2021-12-07 PROCEDURE — 94003 VENT MGMT INPAT SUBQ DAY: CPT

## 2021-12-07 PROCEDURE — C9113 INJ PANTOPRAZOLE SODIUM, VIA: HCPCS | Performed by: INTERNAL MEDICINE

## 2021-12-07 PROCEDURE — C1752 CATH,HEMODIALYSIS,SHORT-TERM: HCPCS

## 2021-12-07 PROCEDURE — 71045 X-RAY EXAM CHEST 1 VIEW: CPT

## 2021-12-07 PROCEDURE — 87070 CULTURE OTHR SPECIMN AEROBIC: CPT

## 2021-12-07 PROCEDURE — P9016 RBC LEUKOCYTES REDUCED: HCPCS

## 2021-12-07 PROCEDURE — P9059 PLASMA, FRZ BETWEEN 8-24HOUR: HCPCS

## 2021-12-07 PROCEDURE — 36556 INSERT NON-TUNNEL CV CATH: CPT

## 2021-12-07 RX ORDER — SODIUM CHLORIDE 9 MG/ML
250 INJECTION, SOLUTION INTRAVENOUS AS NEEDED
Status: DISCONTINUED | OUTPATIENT
Start: 2021-12-07 | End: 2021-12-07

## 2021-12-07 RX ORDER — DIPHENHYDRAMINE HYDROCHLORIDE 50 MG/ML
50 INJECTION, SOLUTION INTRAMUSCULAR; INTRAVENOUS ONCE
Status: COMPLETED | OUTPATIENT
Start: 2021-12-07 | End: 2021-12-07

## 2021-12-07 RX ORDER — METOCLOPRAMIDE HYDROCHLORIDE 5 MG/ML
5 INJECTION INTRAMUSCULAR; INTRAVENOUS EVERY 8 HOURS
Status: DISCONTINUED | OUTPATIENT
Start: 2021-12-07 | End: 2021-12-16

## 2021-12-07 RX ORDER — PHENYLEPHRINE HCL IN 0.9% NACL 100MG/250
0-300 PLASTIC BAG, INJECTION (ML) INTRAVENOUS
Status: DISCONTINUED | OUTPATIENT
Start: 2021-12-07 | End: 2021-12-08

## 2021-12-07 RX ORDER — IPRATROPIUM BROMIDE AND ALBUTEROL SULFATE 2.5; .5 MG/3ML; MG/3ML
3 SOLUTION RESPIRATORY (INHALATION)
Status: DISCONTINUED | OUTPATIENT
Start: 2021-12-07 | End: 2021-12-24 | Stop reason: HOSPADM

## 2021-12-07 RX ORDER — CALCITRIOL 0.25 UG/1
0.5 CAPSULE ORAL DAILY
Status: DISCONTINUED | OUTPATIENT
Start: 2021-12-08 | End: 2021-12-09

## 2021-12-07 RX ORDER — LIDOCAINE HYDROCHLORIDE 20 MG/ML
INJECTION, SOLUTION INFILTRATION; PERINEURAL
Status: DISPENSED
Start: 2021-12-07 | End: 2021-12-08

## 2021-12-07 RX ORDER — LIDOCAINE HYDROCHLORIDE 20 MG/ML
INJECTION, SOLUTION EPIDURAL; INFILTRATION; INTRACAUDAL; PERINEURAL
Status: DISPENSED
Start: 2021-12-07 | End: 2021-12-08

## 2021-12-07 RX ORDER — CALCIUM ACETATE 667 MG/1
2 CAPSULE ORAL
Status: DISCONTINUED | OUTPATIENT
Start: 2021-12-07 | End: 2021-12-24 | Stop reason: HOSPADM

## 2021-12-07 RX ADMIN — CALCIUM CHLORIDE, MAGNESIUM CHLORIDE, DEXTROSE MONOHYDRATE, LACTIC ACID, SODIUM CHLORIDE, SODIUM BICARBONATE AND POTASSIUM CHLORIDE: 5.15; 2.03; 22; 5.4; 6.46; 3.09; .157 INJECTION INTRAVENOUS at 18:47

## 2021-12-07 RX ADMIN — CALCIUM CHLORIDE, MAGNESIUM CHLORIDE, DEXTROSE MONOHYDRATE, LACTIC ACID, SODIUM CHLORIDE, SODIUM BICARBONATE AND POTASSIUM CHLORIDE: 5.15; 2.03; 22; 5.4; 6.46; 3.09; .157 INJECTION INTRAVENOUS at 18:48

## 2021-12-07 RX ADMIN — PHENYLEPHRINE HYDROCHLORIDE 180 MCG/MIN: 10 INJECTION INTRAVENOUS at 06:13

## 2021-12-07 RX ADMIN — PHENYLEPHRINE HYDROCHLORIDE 215 MCG/MIN: 10 INJECTION INTRAVENOUS at 03:33

## 2021-12-07 RX ADMIN — Medication 40 ML: at 22:23

## 2021-12-07 RX ADMIN — DIPHENHYDRAMINE HYDROCHLORIDE 50 MG: 50 INJECTION, SOLUTION INTRAMUSCULAR; INTRAVENOUS at 03:34

## 2021-12-07 RX ADMIN — PHENYLEPHRINE HYDROCHLORIDE 165 MCG/MIN: 10 INJECTION INTRAVENOUS at 00:52

## 2021-12-07 RX ADMIN — CHLORHEXIDINE GLUCONATE 15 ML: 1.2 RINSE ORAL at 22:22

## 2021-12-07 RX ADMIN — PIPERACILLIN AND TAZOBACTAM 3.38 G: 3; .375 INJECTION, POWDER, LYOPHILIZED, FOR SOLUTION INTRAVENOUS at 18:28

## 2021-12-07 RX ADMIN — ALBUMIN (HUMAN) 25 G: 0.25 INJECTION, SOLUTION INTRAVENOUS at 13:18

## 2021-12-07 RX ADMIN — SODIUM CHLORIDE 40 MG: 9 INJECTION INTRAMUSCULAR; INTRAVENOUS; SUBCUTANEOUS at 08:24

## 2021-12-07 RX ADMIN — Medication 10 ML: at 08:13

## 2021-12-07 RX ADMIN — PROPOFOL 30 MCG/KG/MIN: 10 INJECTION, EMULSION INTRAVENOUS at 14:00

## 2021-12-07 RX ADMIN — Medication 1 AMPULE: at 08:13

## 2021-12-07 RX ADMIN — Medication 1 AMPULE: at 20:56

## 2021-12-07 RX ADMIN — Medication 50 MCG/HR: at 22:23

## 2021-12-07 RX ADMIN — CHLORHEXIDINE GLUCONATE 15 ML: 1.2 RINSE ORAL at 09:04

## 2021-12-07 RX ADMIN — Medication 11 MCG/MIN: at 23:04

## 2021-12-07 RX ADMIN — PROPOFOL 30 MCG/KG/MIN: 10 INJECTION, EMULSION INTRAVENOUS at 20:07

## 2021-12-07 RX ADMIN — Medication 22 MCG/MIN: at 03:33

## 2021-12-07 RX ADMIN — SODIUM CHLORIDE 40 MG: 9 INJECTION INTRAMUSCULAR; INTRAVENOUS; SUBCUTANEOUS at 22:22

## 2021-12-07 RX ADMIN — Medication 10 ML: at 13:04

## 2021-12-07 RX ADMIN — METOCLOPRAMIDE HYDROCHLORIDE 5 MG: 5 INJECTION INTRAMUSCULAR; INTRAVENOUS at 14:42

## 2021-12-07 RX ADMIN — METOCLOPRAMIDE HYDROCHLORIDE 5 MG: 5 INJECTION INTRAMUSCULAR; INTRAVENOUS at 09:53

## 2021-12-07 RX ADMIN — CALCIUM CHLORIDE, MAGNESIUM CHLORIDE, DEXTROSE MONOHYDRATE, LACTIC ACID, SODIUM CHLORIDE, SODIUM BICARBONATE AND POTASSIUM CHLORIDE: 5.15; 2.03; 22; 5.4; 6.46; 3.09; .157 INJECTION INTRAVENOUS at 18:49

## 2021-12-07 RX ADMIN — Medication 22 MCG/MIN: at 09:56

## 2021-12-07 RX ADMIN — FAMOTIDINE 20 MG: 10 INJECTION, SOLUTION INTRAVENOUS at 04:39

## 2021-12-07 RX ADMIN — METOCLOPRAMIDE HYDROCHLORIDE 5 MG: 5 INJECTION INTRAMUSCULAR; INTRAVENOUS at 22:22

## 2021-12-07 RX ADMIN — IOPAMIDOL 100 ML: 755 INJECTION, SOLUTION INTRAVENOUS at 05:45

## 2021-12-07 RX ADMIN — METHYLPREDNISOLONE SODIUM SUCCINATE 125 MG: 125 INJECTION, POWDER, FOR SOLUTION INTRAMUSCULAR; INTRAVENOUS at 03:34

## 2021-12-07 RX ADMIN — PROPOFOL 35 MCG/KG/MIN: 10 INJECTION, EMULSION INTRAVENOUS at 07:59

## 2021-12-07 RX ADMIN — Medication 24 MCG/MIN: at 16:00

## 2021-12-07 NOTE — PROGRESS NOTES
0700- Bedside shift change report given to Ji (oncoming nurse) by Katie Valdez (offgoing nurse). Report included the following information SBAR, Kardex, Intake/Output, MAR, Accordion and Recent Results    0740- Dr. Sacha Maravilla at bedside and ordered for pressor goal to be SBP greater than 100 mmHg. 7094- 1 unit of plasma transfused. No transfusion reaction suspected. 0930- Dr. Gil Stevens ordered for OG tube. OG placed without difficulty (62 cm at the lips) immediately got 200 mL of bright red blood out then hooked to intermittent suction. KUB ordered per protocol to verify placement. 1030- Interdisciplinary team rounds were held 12/7/2021 with the following team members:Care Management, Diabetes Treatment Specialist, Nursing, Nutrition, Pharmacy, Physical Therapy, Physician and Respiratory Therapy. Plan of care discussed. See clinical pathway and/or care plan for interventions and desired outcomes. Goals of the Day: wait for CBC recheck to make a decision regards platelet transfusion. 80- Nephrology at bedside and placed orders for HD today. Dialysis suite notified. 1300- started HD. Pt BP unable to tolerate. HD nurse rinsed back and took pt off he dialysis machine. Pressors titrated appropriately throughout process. Dr. Didi Dunbar ordered CRRT.     1616- Telephone consent obtained for CRRT and Jorge A cath placement with Amada Moreira RN. 1700- IR came to bedside and placed left femoral Jorge A catheter. Site is clean, dry, intact, no obvious bleeding. 1823- 1 unit of PRBC started. 1900- Bedside shift change report given to Alvina (oncoming nurse) by Andi Moore (offgoing nurse). Report included the following information SBAR, Kardex, Intake/Output, MAR and Recent Results.

## 2021-12-07 NOTE — PROGRESS NOTES
Physician Progress Note      PATIENT:               Hermilo Nielsen  CSN #:                  [id number]  :                       1975  ADMIT DATE:       2021 11:42 AM  DISCH DATE:  RESPONDING  PROVIDER #:        Lavinia Orona MD          QUERY TEXT:    Patient admitted with Covid 19, developed acute bleeding 21 and is on chronic anticoagulation. If possible, please document in the progress notes and discharge summary if you are evaluating and/or treating any of the following: The medical record reflects the following:  Risk Factors: 41 y/o infected with Covid, complaining of abdominal pain. RRT called 21 Pt. Vomiting bright red blood, pt with dark red blood from waist down, pt. on warfarin 2.5 mg daily, ASA 81mg daily. pt did receive Vit. K and DDAVP as well as blood transfusions. intubated,and Trevino placed Pressors started, GI consulted, EGD performed. Clinical Indicators:   1230 RRT Hypotension; pt vomiting bright red blood BP 70/43 bed with blood dark red with clots waist down  250ns bolus, albumin 25g, [id number] tx to CCU     GI procedure EGD  Indication: Melena, hematemesis, acute blood loss anemia  Findings:  Esophagus:  Full of fresh blood and large clots. Eventually cleared and two ulcers (10 mm) and (5 mm) seen in distal esophagus at 42 cm. Very friable, but no active bleeding. Injected Epi 1 : 10,000 total of 8.5 cc into area around both ulcers. Stomach: Full of huge clots, so only about 20 % of mucosa seen. Clots in stomach are dark and look old. Cannot find pylorus.   Duodenum: not entered due to blood obscurring    Therapies:  Injection of epinephrine as above    recs: Repeat EGD later this week---timing TBD, avoid all anticoagulants  2021 07:30 INR: 3.8 (H) Prothrombin time: 37.2 (H)  2021 09:39bINR: 1.4 (H)Prothrombin time: 14.8 (H)        2021 09:39 HGB: 6.1 (L) HCT: 18.6 (L)  2021 20:08 HGB: 7.2 (L) HCT: 21.6 (L)  2021 02:06 HGB: 4.8 (LL) HCT: 14.4 (LL)  12/7/2021 08:18 HGB: 7.5 (L) HCT: 22.7 (L)    12/6/21 1400 DDAVP X 1  12/6/21 1000 Vit K x 1    Treatment: RRT called, Tx to ICU, Vit K, DDAVP, GI Consult, EGD, Hold all anticoagulants, Start Pressors, Levophed, Neosynephrine, transfuse PRB's, plasma, vitals, intubated,  Options provided:  -- Bleeding associated with Warfarin  -- Bleeding unrelated to anticoagulation  -- Other - I will add my own diagnosis  -- Disagree - Not applicable / Not valid  -- Disagree - Clinically unable to determine / Unknown  -- Refer to Clinical Documentation Reviewer    PROVIDER RESPONSE TEXT:    This patient has bleeding associated with warfarin.     Query created by: Johana Ashford on 12/7/2021 11:19 AM      Electronically signed by:  Bakari Parsons MD 12/7/2021 4:30 PM

## 2021-12-07 NOTE — PROGRESS NOTES
Nephrology Progress Note  Blaise Leblanc     www. [facility] [employer]  Phone - [phone]   Patient: Ashia Gama    YOB: 1975        Date- 12/7/2021   Admit Date: 11/27/2021  CC: Follow up for ESR         IMPRESSION & PLAN:   · ESRD-- home HD 5 days per week- Follows up with Dr Trish Alvarez at [facility] ( now [facility] for IV antibiotics.)  · Covid 19 infection  · UPPER GI BLEED  · hyperkalemia  · Left arm swelling- s/p angioplasty of left subclavian vein  · Gram positive sepsis from dental abcess  · s/p lead extraction at [facility]  · Anemia of ckd  · Hx of renal tx in past times 2.  · Hypertension  · CAD  · Failed RTX times 2         H/o DVT, s/p ivc filter/ h/o HIT      PLAN-   HD TODAY   IF HE can't tolerate hd - we will place rakel cath and start CRRT today   May need to add roya gtt during hd   GI input noted    EPO for anemia     Subjective: Interval History:   He is on vent  He is on vasopressor support  He received multiple blood product      Objective:   Vitals:    12/07/21 0947 12/07/21 0951 12/07/21 0953 12/07/21 1030   BP:  121/85 121/85 107/73   Pulse: 90 89 90 93   Resp: 18 19 19 20   Temp:       TempSrc:       SpO2: 100% 100% 100% 98%   Weight:       Height:          12/06 0701 - 12/07 0700  In: 8620 [I.V.:2164.3]  Out: 150   Last 3 Recorded Weights in this Encounter    12/04/21 0616 12/05/21 0634 12/06/21 0619   Weight: 63.2 kg (139 lb 5.3 oz) 64.7 kg (142 lb 10.2 oz) 65 kg (143 lb 4.8 oz)      Physical exam:   GEN: ON VENT  NECK- ET tube +  RESP: no distress  NEURO:Can't access due to patient's current condition             Chart reviewed. Pertinent Notes reviewed.      Data Review :  Recent Labs     12/07/21  0818 12/07/21  0206 12/06/21 2126    140 139   K 5.2* 4.9 4.9   CL 99 102 103   CO2 19* 21 23   BUN 73* 71* 65*   CREA 6.24* 6.18* 5.82*   * 96 114*   CA 7.2* 7.2* 6.8*     Recent Labs     12/07/21  0818 12/07/21  0206 12/06/21 2008   WBC 19.4* 14.0* 13.0*   HGB 7.5* 4.8* 7.2*   HCT 22.7* 14.4* 21.6*   PLT 69* 47* 40*     Recent Labs     12/06/21  0939   FERR 1,339*      Medication list  reviewed  Current Facility-Administered Medications   Medication Dose Route Frequency    0.9% sodium chloride infusion 250 mL  250 mL IntraVENous PRN    PHENYLephrine (LUIS-SYNEPHRINE) 100 mg in 0.9% sodium chloride 250 mL infusion   mcg/min IntraVENous TITRATE    [START ON 12/8/2021] calcitRIOL (ROCALTROL) capsule 0.5 mcg  0.5 mcg Per G Tube DAILY    calcium acetate(phosphat bind) (PHOSLO) capsule 1,334 mg  2 Capsule Per NG tube TID WITH MEALS    metoclopramide HCl (REGLAN) injection 5 mg  5 mg IntraVENous Q8H    pantoprazole (PROTONIX) 40 mg in 0.9% sodium chloride 10 mL injection  40 mg IntraVENous Q12H    Warfarin- Hold until bleeding has resolved   Other Rx Dosing/Monitoring    albumin human 25% (BUMINATE) solution 25 g  25 g IntraVENous DIALYSIS PRN    0.9% sodium chloride infusion 250 mL  250 mL IntraVENous PRN    0.9% sodium chloride infusion 250 mL  250 mL IntraVENous PRN    propofol (DIPRIVAN) 10 mg/mL infusion  0-50 mcg/kg/min IntraVENous TITRATE    fentaNYL citrate (PF) injection 25-50 mcg  25-50 mcg IntraVENous Q2H PRN    NOREPINephrine (LEVOPHED) 8 mg in 5% dextrose 250mL (32 mcg/mL) infusion  0.5-30 mcg/min IntraVENous TITRATE    0.9% sodium chloride infusion 250 mL  250 mL IntraVENous PRN    0.9% sodium chloride infusion 250 mL  250 mL IntraVENous PRN    fentaNYL (PF) 1,500 mcg/30 mL (50 mcg/mL) infusion  0-200 mcg/hr IntraVENous TITRATE    alcohol 62% (NOZIN) nasal  1 Ampule  1 Ampule Topical Q12H    chlorhexidine (PERIDEX) 0.12 % mouthwash 15 mL  15 mL Oral Q12H    epoetin emilie-epbx (RETACRIT) 12,000 Units combo injection  12,000 Units SubCUTAneous Q MON, WED & FRI    [Held by provider] calcium carbonate (TUMS) chewable tablet 200 mg [elemental]  200 mg Oral TID WITH MEALS    aluminum-magnesium hydroxide (MAALOX) oral suspension 15 mL  15 mL Oral QID PRN    sodium chloride (NS) flush 5-10 mL  5-10 mL IntraVENous PRN    acetaminophen (TYLENOL) tablet 650 mg  650 mg Oral Q6H PRN    [Held by provider] L.acidophilus-paracasei-S.thermophil-bifidobacter (RISAQUAD) 8 billion cell capsule  1 Capsule Oral DAILY    [Held by provider] aspirin chewable tablet 81 mg  81 mg Oral DAILY    sodium chloride (NS) flush 5-40 mL  5-40 mL IntraVENous Q8H    sodium chloride (NS) flush 5-40 mL  5-40 mL IntraVENous PRN    polyethylene glycol (MIRALAX) packet 17 g  17 g Oral DAILY PRN    ondansetron (ZOFRAN) injection 4 mg  4 mg IntraVENous Q6H PRN    albuterol (PROVENTIL HFA, VENTOLIN HFA, PROAIR HFA) inhaler 2 Puff  2 Puff Inhalation Q6H PRN          Lorene Araujo MD              [facility]  [facility] / [facility], [address] [place], [address]  Phone - [phone]               Fax - [phone]

## 2021-12-07 NOTE — PROGRESS NOTES
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 He was admitted to Hospitalist Service with Acute COVID infection, mild hypoxemic respiratory failure, recent strep intermedius bacteremia. Transferred to ICU  after developing hematemesis and shock. PRINCIPLE ICU DIAGNOSIS:  Hemorrhagic shock due to UGIB    Hospital course/major results:   Admission as above   Transferred to ICU for hematemesis, shock. Intubated semi-electively for EGD. Required multiple units RBC, FFP, plts. EGD revealed small distal esophageal ulcers and large volume of blood and clots in stomach without clear source of bleeding identified. Required vasopressors. Continued blood loss into night requiring more blood products.  CTAP: 1. There is a large amount of hemorrhage within a distended stomach, extending into the duodenum. An active bleed is not seen on this examination. However, delayed images were not performed, somewhat limiting the study. 2. Bilateral pleural effusions with bilateral pneumonia. Lines/tubes/devices:  ETT  >>   R femoral CVL  >>       COMPREHENSIVE CCM ASSESSMENT/PLAN (by systems)       PULMONARY:  1. Vent dep respiratory failure - intubated for EGD  2.  B pulmonary infiltrates - concern for aspiration    Current vent settings:       PLAN   - Ventilator settings established/reviewed with RT and adjusted as indicated  - Lung protection ventilator strategy:    Maintain Vt 4-8 cc/kg IBW   Maintain Pplat < 30 cm H2O if possible   Maintain driving P < 15 cm U9J if possible   Optimize PEEP   Minimize O2 maintaining SpO2 > 90%  - Daily SBT if/when daily screening criteria met      CARDIOVASCULAR/HEMODYNAMIC:  3. Shock - hemorrhagic. Possibly septic    Current vasopressors: NE 24 mcg/min    Current HD support devices:      PLAN  - cardiac/hemodynamic monitoring  - MAP goal > 60 mmHg  - SBP goal > 100 mmHg      RENAL:  4. ESRD  5. Mild metabolic acidosis  6. Mild hyperkalemia    Current RRT:     PLAN  - Monitor chemistry panel daily  - Correct electrolytes as indicated  - Nephrology following  - CRRT to be initiated 12/07    GI/NUTRITION:  7. Acute UGIB  8. Protein-calorie malnutrition      Current nutritional support: none  SUP: high dose IV PPI    PLAN  - Cont high dose PPI  - If rebleeds, consider repeat CTA abdomen and IR vs general surgery involvement  -       INFECTIOUS DISEASE  9. Suspect aspiration PNA  10. Severe sepsis    Micro:  Resp 12/07 >. Antibiotics:  Pip-tazo 12/07 >>     PLAN  - Follow culture results to completion  - Duration of antimicrobial therapy TBD  - Track PCT    HEME  11. ABLA  12. Thrombocytopenia    DVT prophylaxis: none - has IVC filter    PLAN  - Daily CBC  - Transfuse as needed to maintain Hgb > 7.0 gm/dL or for hemodynamically significant bleeding  - Transfuse plts to maintain > 50k  - Transfuse FFP to maintain INR < 1.5    NEURO  13. Cognition fully intact prior to admission  14. ICU/vent associated discomfort      RASS goal: -1, -2  Current sedatives: propofol  Current analgesics: fentanyl    PLAN   - Daily SAT when meets ICU criteria  - ABCDEF mobility bundle  - PT/OT involvement when appropriate      ENDOCRINE  15. Episodic hyperglycemia without prior dx of DM    PLAN  - Target glucose: 100-180  - Glycemic control: N/I presently    GOALS OF CARE/ADVANCED DIRECTIVES  16. CODE STATUS: Full   17.        HPI/Consult/Subjective:   24 Hr Events 12/7/2021:   As above    SUBJ:   RASS -4, not F/C, synchronous with vent      Past Medical History:      has a past medical history of Abdominal hematoma, AICD (automatic cardioverter/defibrillator) present, CAD (coronary artery disease), Chronic abdominal pain, Chronic kidney disease, DVT (deep venous thrombosis) ([facility] [address] (2001), DVT of popliteal vein ([address] (11/2011), Endocarditis, Gallstone pancreatitis, Gastrointestinal disorder, Gastrointestinal disorder, Hemodialysis patient ([address] High cholesterol, Hypertension, Kidney transplant, Long term current use of anticoagulant therapy, Nephrotic syndrome, Other ill-defined conditions(799.89), Other ill-defined conditions(799.89), Other ill-defined conditions(799.89), Peritonitis ([address] Seizures ([address] (2015), Small bowel obstruction ([address] 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[address] and V-tach ([facility] [address] Past Surgical History:      has a past surgical history that includes pr edg us exam surgical alter stom duodenum/jejunum (7/15/2011); pr esophagogastroduodenoscopy transoral diagnostic (5/24/2012); hx cholecystectomy; hx appendectomy; hx small bowel resection; hx other surgical (11/2011); hx other surgical; hx other surgical (02/2013); hx transplant (2001 ); hx transplant (1992); vascular surgery procedure unlist (11/14/2017); hx pacemaker (Left, 6/2009); hx pacemaker (Left); pr cardiac surg procedure unlist (2007); hx vascular access (Right); ir replace cvc tunneled w/o port (9/10/2020); ir remove tunl cvad w/o port / pump (10/29/2020); and upper gi endoscopy,ctrl bleed (12/6/2021). Home Medications:     Prior to Admission medications    Medication Sig Start Date End Date Taking? Authorizing Provider   vancomycin in 0.9% sodium chloride (VANCOCIN) 1 gram/200 mL pgbk 150 mL by IntraVENous route every Monday, Wednesday, Friday for 8 doses. After hemodialysis treatment 11/19/21 12/7/21  Rakesh Kenny MD   ondansetron (Zofran ODT) 4 mg disintegrating tablet 1 Tab by SubLINGual route every eight (8) hours as needed for Nausea or Vomiting. 3/27/21   Ramirez Espinal MD   metoprolol succinate (TOPROL-XL) 25 mg XL tablet Take 0.5 Tabs by mouth daily.  11/10/20   Malvin rGegorio MD   calcium acetate,phosphat bind, (PHOSLO) 667 mg cap Take 2 Caps by mouth three (3) times daily (with meals). Indications: low amount of calcium in the blood, renal osteodystrophy with hyperphosphatemia 11/10/20   Skyla Sheehan MD   atorvastatin (Lipitor) 20 mg tablet Take 20 mg by mouth daily. Provider, Historical   warfarin (COUMADIN) 2.5 mg tablet Take 2.5 mg by mouth daily. Provider, Historical   acetaminophen (TYLENOL) 500 mg tablet Take 1 Tab by mouth every four (4) hours as needed for Pain. Over the counter 19   Hemant Neff MD   omeprazole (PRILOSEC) 20 mg capsule Take 20 mg by mouth daily. Provider, Historical   calcitRIOL (ROCALTROL) 0.5 mcg capsule Take 0.5 mcg by mouth daily. Provider, Historical   aspirin 81 mg chewable tablet Take 81 mg by mouth daily. Other, MD Morro       Allergies/Social/Family History:      Allergies   Allergen Reactions    Shellfish Containing Products Swelling     Break out in rash, trouble breathing    Contrast Agent [Iodine] Shortness of Breath    Heparin Analogues Other (comments)     Positive history of HIT      Social History     Tobacco Use    Smoking status: Never Smoker    Smokeless tobacco: Never Used   Substance Use Topics    Alcohol use: No      Family History   Problem Relation Age of Onset    COPD Mother     Lung Disease Mother     Cancer Father         stomach    Cancer Maternal Grandmother            Objective:   Vital Signs:  Visit Vitals  /74   Pulse 92   Temp 98.8 °F (37.1 °C)   Resp 20   Ht 5' 7\" (1.702 m)   Wt 65 kg (143 lb 4.8 oz)   SpO2 97%   BMI 22.44 kg/m²    O2 Flow Rate (L/min): 2 l/min O2 Device: Endotracheal tube, Ventilator Temp (24hrs), Av.8 °F (36.6 °C), Min:96.7 °F (35.9 °C), Max:98.8 °F (37.1 °C)           Intake/Output:     Intake/Output Summary (Last 24 hours) at 2021 1631  Last data filed at 2021 1400  Gross per 24 hour   Intake 5533.76 ml   Output 0 ml   Net 5533.76 ml       Physical Exam:  GEN: chronically ill appearing RASS -4, not F/C, synchronous with vent  HEENT: NCAT, sclerae white  NECK: No JVD noted  CHEST: Clear to auscultation and percussion  CARDIAC: sinus rhythm, regular, no murmur noted  ABD: Soft, NT, +BS  EXT: Symmetric LE edema  NEURO: Cranial nerves intact, symmetric strength, no focal deficits noted  DERM: No lesions noted    I have examined the patient on this day 12/7/2021 and the above documented exam is accurate including the components that have been copied forward    LABS AND  DATA: Personally reviewed  Recent Labs     12/07/21  1438 12/07/21  0818   WBC 21.2* 19.4*   HGB 7.1* 7.5*   HCT 21.1* 22.7*   PLT 78* 69*     Recent Labs     12/07/21  0818 12/07/21  0206    140   K 5.2* 4.9   CL 99 102   CO2 19* 21   BUN 73* 71*   CREA 6.24* 6.18*   * 96   CA 7.2* 7.2*     Recent Labs     12/06/21  0939   LPSE 1,177*     Recent Labs     12/07/21  0206 12/06/21  0939   INR 1.6* 1.4*   PTP 15.9* 14.8*      Recent Labs     12/06/21  1242   PHI 7.45   PCO2I 32.3*   PO2I 65*   FIO2I 1     No results for input(s): CPK, CKMB, TROIQ, BNPP in the last 72 hours. Hemodynamics:   PAP:   CO:     Wedge:   CI:     CVP:    SVR:       PVR:       Ventilator Settings:  Mode Rate Tidal Volume Pressure FiO2 PEEP   PRVC   400 ml    25 % 5 cm H20     Peak airway pressure: 23 cm H2O    Minute ventilation: 8.3 l/min        MEDS: Reviewed    Chest X-Ray:  CXR Results  (Last 48 hours)               12/07/21 0500  XR CHEST PORT Final result    Impression:  No change. Narrative:  Clinical indication: Respiratory distress. Portable AP semiupright view of the chest obtained, comparison December 6. ET   tube is in place above the leo. Patchy infiltrates unchanged. 12/06/21 1405  XR CHEST PORT Final result    Impression:  Support devices in place. Narrative:  Clinical indication: ETT, NG tube. Portable AP supine view of the chest obtained, comparison December 2.  The tip of   the ET tube is in satisfactory position above the leo. The tip of the NG tube   is not included in this study but is in the stomach. Bilateral infiltrates and   size of the heart are stable. I have reviewed the above films and agree with official interpretation         Multidisciplinary Rounds Completed:  Yes      SPECIAL EQUIPMENT  CRRT    DISPOSITION  Stay in ICU    CRITICAL CARE CONSULTANT NOTE  I had a in-person encounter with Latoya Vasquez, reviewed and interpreted patient data including events, labs, images, vital signs, I/O's, and examined patient. I have discussed the case and the plan and management of the patient's care with the consulting services, the bedside nurses and the respiratory therapist.      NOTE OF PERSONAL INVOLVEMENT IN CARE   This patient is at high risk for sudden and clinically significant deterioration, which requires the highest level of preparedness to intervene urgently. I participated in the decision-making and personally managed or directed the management of the following life and organ supporting interventions that required my frequent assessment to treat or prevent imminent deterioration. I personally spent 50 minutes of critical care time. This is time spent at patient's bedside actively involved in patient care as well as the coordination of care and discussions with the patient's family. This does not include any procedural time which has been billed separately.     Misael Blank MD  Pulmonary/[facility]  [phone]  12/7/2021

## 2021-12-07 NOTE — PROGRESS NOTES
Blaise Leblanc         NAME:Claude Nolan Pen Thena Oscar  IGN:[id number]   :1975              Patient couldn't tolerate hd   He will need rakel cath placement    We will start CRRT    Consent for hd and hd cath obtained from Tex Jackson- [phone] via phone    She understands risk and benefit for procedure. She agrees to proceed with the procedure. D/w ICU nurse               Keena Durp [facility]  [facility] [address] [place], [address] [place]  Phone - [phone]         Fax - [phone] [facility] Office  [address] [place] [address]  Phone - [phone]        Fax - [phone]     [email or web address]

## 2021-12-07 NOTE — PROGRESS NOTES
GI PROGRESS NOTE  Samira Jackson, LISA  [phone] NP in-hospital cell phone M-F until 4:30  After 5pm or on weekends, please call  for physician on call    NAME:Claude [place]en [place] :1975 PAV:[id number]   ATTG: Dr Jessica Chen PCP: Shiela Leonardo MD Date/Time:  2021 1015 AM     Primary GI: Dr. Óscar Blackman - Dr Friend [place] covering     Assessment:   Acute onset of Hematemesis - 2/2 ulcers in distal esophagus but could be more further down that we were unable to visualize due to blood in stomach  Acute Blood Loss anemia - hgb dropped to 4.8 overnight  Chronic thrombocytopenia - plt dropped to 47 overnight  On Warfarin and ASA 81mg - last dose 21 PM  Hyperkalemia  - Sudden onset today at 0900 with about 100-200cc of BRB in emesis on 21 AM  - INR 1.6 today  - Neg Ct today for active bleed but large amount of blood in stomach  - Critical pt on pressors, ventilator, and sedated     CT abd/pel W/WO contrast 21 : Bleeding protocol  1. There is a large amount of hemorrhage within a distended stomach, extending  into the duodenum. An active bleed is not seen on this examination. However,  delayed images were not performed, somewhat limiting the study. 2. Bilateral pleural effusions with bilateral pneumonia. CT abd/pel W contrast 21 : 1. Relatively small right pleural effusion and minimal left pleural effusion, increased since 2021.  2. No acute findings in the abdomen or pelvis. .   3. Renal atrophy and calcified transplant kidneys. 4. Hepatomegaly. No focal lesion. 5. Small amount of ascites. Anasarca. EGD 21 : Esophagus:  Full of fresh blood and large clots. Eventually cleared and two ulcers (10 mm) and (5 mm) seen in distal esophagus at 42 cm. Very friable, but no active bleeding. Injected Epi 1 : 10,000 total of 8.5 cc into area around both ulcers. Stomach: Full of huge clots, so only about 20 % of mucosa seen. Clots in stomach are dark and look old.  Cannot find pylorus. Duodenum: not entered due to blood obscurring     Bacteremia - Gram + sepsis with dental abscess  COVID+ -  Admitted 12/27/21  ESRD on dialysis  Chronic pain  HTN     Plan:   · For progressing gastric distention - okay to placed NG/OG tube - discussed with Dr Kavita Giraldo  · Pt is continuing to bleed in stomach but if unable to stabalize, we are agreeable to repeating EGD but will be difficult to see as view was obscured from stomach full of blood unfortunately. Discussed with Dr Kavita Giraldo that if next step would be to discuss case with surgery vs supportive care vs palliative  · Continue acid suppression  · Follow CBC - transfuse with blood, plts, ffp as necessary  · Hold all anticoagulation - previously on coumadin  · Maintain NPO  · Reglan around the clock - dr nava ordered  · Rest per primary team.     Plan discussed with Dr Rell Schofield. Thank you for consultation. Subjective:   Discussed with RN events overnight. Resting in room. Sedated, on vent. On pressors. Complaint Y/N Description   Abdominal Pain     Hematemesis     Hematochezia     Melena     Constipation     Diarrhea     Dyspepsia     Dysphagia     Jaundiced     Nausea/vomiting       Review of Systems:  Symptom Y/N Comments  Symptom Y/N Comments   Fever/Chills    Chest Pain     Cough    Headaches     Sputum    Joint Pain     SOB/ZAMUDIO    Pruritis/Rash     Tolerating Diet    Other       Could NOT obtain due to: Sedated and intubated     Objective:   VITALS:   Last 24hrs VS reviewed since prior progress note. Most recent are:  Visit Vitals  /70   Pulse 92   Temp 98.1 °F (36.7 °C)   Resp 21   Ht 5' 7\" (1.702 m)   Wt 65 kg (143 lb 4.8 oz)   SpO2 100%   BMI 22.44 kg/m²       Intake/Output Summary (Last 24 hours) at 12/7/2021 1255  Last data filed at 12/7/2021 1100  Gross per 24 hour   Intake 7482.67 ml   Output 150 ml   Net 7332.67 ml     PHYSICAL EXAM:  General: WD, chronically ill appearing, sedated and intubated    HEENT: NC, Atraumatic. Anicteric sclerae. Lungs:  Intubated - ET tube in placed. Heart:  Regular  rhythm  Abdomen: + distention.  +Bowel sounds, no HSM  Extremities: No c/c/e  Neurologic:  Intubated and sedated  Psych:   Intubated and sedated. Lab and Radiology Data Reviewed: (see below)    Medications Reviewed: (see below)  PMH/SH reviewed - no change compared to H&P  ________________________________________________________________________  Total time spent with patient: 30 minutes ________________________________________________________________________  Care Plan discussed with:  Patient y   Family     RN y              Consultant:  karishma Willis NP     Procedures: see electronic medical records for all procedures/Xrays and details which were not copied into this note but were reviewed prior to creation of Plan. LABS:  Recent Labs     12/07/21  0818 12/07/21  0206   WBC 19.4* 14.0*   HGB 7.5* 4.8*   HCT 22.7* 14.4*   PLT 69* 47*     Recent Labs     12/07/21  0818 12/07/21  0206 12/06/21  2126    140 139   K 5.2* 4.9 4.9   CL 99 102 103   CO2 19* 21 23   BUN 73* 71* 65*   CREA 6.24* 6.18* 5.82*   * 96 114*   CA 7.2* 7.2* 6.8*     Recent Labs     12/06/21  0939   LPSE 1,177*     Recent Labs     12/07/21  0206 12/06/21  0939 12/05/21  0831   INR 1.6* 1.4* 1.4*   PTP 15.9* 14.8* 14.8*      Recent Labs     12/06/21  0939   FERR 1,339*      Lab Results   Component Value Date/Time    Folate 7.5 11/23/2011 11:26 AM     Recent Labs     12/06/21  1639   PH 7.41   PCO2 35   PO2 436*     No results for input(s): CPK, CKMB in the last 72 hours.     No lab exists for component: TROPONINI  Lab Results   Component Value Date/Time    Color RED 11/16/2012 05:15 PM    Appearance OPAQUE 11/16/2012 05:15 PM    Specific gravity 1.020 11/16/2012 05:15 PM    Specific gravity 1.011 09/17/2012 01:35 AM    pH (UA) 8.0 11/16/2012 05:15 PM    Protein >300 (A) 11/16/2012 05:15 PM    Glucose 100 (A) 11/16/2012 05:15 PM Ketone NEGATIVE  11/16/2012 05:15 PM    Bilirubin NEGATIVE  09/17/2012 01:35 AM    Urobilinogen 0.2 11/16/2012 05:15 PM    Nitrites NEGATIVE  11/16/2012 05:15 PM    Leukocyte Esterase NEGATIVE  11/16/2012 05:15 PM    Epithelial cells 5-10 11/16/2012 05:15 PM    Bacteria 3+ (A) 11/16/2012 05:15 PM    WBC >100 (H) 11/16/2012 05:15 PM    RBC >100 (H) 11/16/2012 05:15 PM       MEDICATIONS:  Current Facility-Administered Medications   Medication Dose Route Frequency    0.9% sodium chloride infusion 250 mL  250 mL IntraVENous PRN    PHENYLephrine (LUIS-SYNEPHRINE) 100 mg in 0.9% sodium chloride 250 mL infusion   mcg/min IntraVENous TITRATE    [START ON 12/8/2021] calcitRIOL (ROCALTROL) capsule 0.5 mcg  0.5 mcg Per G Tube DAILY    calcium acetate(phosphat bind) (PHOSLO) capsule 1,334 mg  2 Capsule Per NG tube TID WITH MEALS    metoclopramide HCl (REGLAN) injection 5 mg  5 mg IntraVENous Q8H    pantoprazole (PROTONIX) 40 mg in 0.9% sodium chloride 10 mL injection  40 mg IntraVENous Q12H    Warfarin- Hold until bleeding has resolved   Other Rx Dosing/Monitoring    albumin human 25% (BUMINATE) solution 25 g  25 g IntraVENous DIALYSIS PRN    0.9% sodium chloride infusion 250 mL  250 mL IntraVENous PRN    0.9% sodium chloride infusion 250 mL  250 mL IntraVENous PRN    propofol (DIPRIVAN) 10 mg/mL infusion  0-50 mcg/kg/min IntraVENous TITRATE    fentaNYL citrate (PF) injection 25-50 mcg  25-50 mcg IntraVENous Q2H PRN    NOREPINephrine (LEVOPHED) 8 mg in 5% dextrose 250mL (32 mcg/mL) infusion  0.5-30 mcg/min IntraVENous TITRATE    0.9% sodium chloride infusion 250 mL  250 mL IntraVENous PRN    0.9% sodium chloride infusion 250 mL  250 mL IntraVENous PRN    fentaNYL (PF) 1,500 mcg/30 mL (50 mcg/mL) infusion  0-200 mcg/hr IntraVENous TITRATE    alcohol 62% (NOZIN) nasal  1 Ampule  1 Ampule Topical Q12H    chlorhexidine (PERIDEX) 0.12 % mouthwash 15 mL  15 mL Oral Q12H    epoetin emilie-epbx (RETACRIT) 12,000 Units combo injection  12,000 Units SubCUTAneous Q MON, WED & FRI    [Held by provider] calcium carbonate (TUMS) chewable tablet 200 mg [elemental]  200 mg Oral TID WITH MEALS    aluminum-magnesium hydroxide (MAALOX) oral suspension 15 mL  15 mL Oral QID PRN    sodium chloride (NS) flush 5-10 mL  5-10 mL IntraVENous PRN    acetaminophen (TYLENOL) tablet 650 mg  650 mg Oral Q6H PRN    [Held by provider] L.acidophilus-paracasei-S.thermophil-bifidobacter (RISAQUAD) 8 billion cell capsule  1 Capsule Oral DAILY    [Held by provider] aspirin chewable tablet 81 mg  81 mg Oral DAILY    sodium chloride (NS) flush 5-40 mL  5-40 mL IntraVENous Q8H    sodium chloride (NS) flush 5-40 mL  5-40 mL IntraVENous PRN    polyethylene glycol (MIRALAX) packet 17 g  17 g Oral DAILY PRN    ondansetron (ZOFRAN) injection 4 mg  4 mg IntraVENous Q6H PRN    albuterol (PROVENTIL HFA, VENTOLIN HFA, PROAIR HFA) inhaler 2 Puff  2 Puff Inhalation Q6H PRN

## 2021-12-07 NOTE — PROGRESS NOTES
GI PROGRESS NOTE  Graceann Opitz, NP  [phone] NP in-hospital cell phone M-F until 4:30  After 5pm or on weekends, please call  for physician on call      Pt now has NG tube. On one pressor. Unable to tolerate dialysis due to hypotension. 300cc of bright red blood from OG tube since placement this AM.   2 black stools per rectum today since AM.   Pt receiving reglan. Hgb 7.1 from 7.5. Will evaluate first thing in AM and tentatively plan for repeat EGD tomorrow with Dr Sharon Cisneros to see re-evaluate and see if further treatment can do done ulcers or if other ulcers/abnormatilities present.      Thank you,   Graceann Opitz, NP

## 2021-12-07 NOTE — PROCEDURES
[facility]                      [phone]  Vitals Pre Post Assessment Pre Post   BP BP: 117/79 (12/07/21 1425) Sedated LOC Sedated Sedated   HR Pulse (Heart Rate): 91 (12/07/21 1425) 100 Lungs On Ventilator On ventilator   Resp Resp Rate: 20 (12/07/21 1425) On ventilator Cardiac NRRR NRRR   Temp Temp: 98.1 °F (36.7 °C) (12/07/21 1200) 98.5 Skin WDI WDI   Weight   Edema Genralized Genaralized   Pain Pain Intensity 1: 3 (12/07/21 1200)  Tele Status On cardiac monitor On cardiac monitor     Orders   Duration: Start: 1313 End: 1345 Total: 32 mins   Dialyzer: Dialyzer/Set Up Inspection: Revaclear (12/07/21 1300)   K Bath: Dialysate K (mEq/L): 2 (12/07/21 1300)   Ca Bath: Dialysate CA (mEq/L): 3.0 (12/07/21 1300)   Na: Dialysate NA (mEq/L): 140 (12/07/21 1300)   Bicarb:  35   Target Fluid Removal: Goal/Amount of Fluid to Remove (mL): 500 mL (12/07/21 1300)     Access   Type & Location: L AVG   Comments: With good thrill and bruit, Edema on the L arm noted. No S/S of infection.                              Labs   HBsAg (Antigen) / date:  Negative ( 12/01/2021)                              HBsAb (Antibody) / date: Immune (11/11/2021)   Source: [facility]   Obtained/Reviewed  Critical Results Called HGB   Date Value Ref Range Status   12/07/2021 7.5 (L) 12.1 - 17.0 g/dL Final     Comment:     INVESTIGATED PER DELTA CHECK PROTOCOL     Potassium   Date Value Ref Range Status   12/07/2021 5.2 (H) 3.5 - 5.1 mmol/L Final     Calcium   Date Value Ref Range Status   12/07/2021 7.2 (L) 8.5 - 10.1 MG/DL Final     BUN   Date Value Ref Range Status   12/07/2021 73 (H) 6 - 20 MG/DL Final     Creatinine   Date Value Ref Range Status   12/07/2021 6.24 (H) 0.70 - 1.30 MG/DL Final        Meds Given   Name Dose Route   Albumin 12.5g IVD               Adequacy / Fluid    Total Liters Process:    Net Fluid Removed: 0 ml      Comments   Time Out Done: 1310   Admitting Diagnosis: Bleeding   Consent obtained/signed: Informed Consent Verified: Yes (12/07/21 1300)   Machine / RO # Machine Number: [id number] (12/07/21 1300)   Primary Nurse Rpt Pre: Dionte Pierre R.N   Primary Nurse Rpt Post: Dionte Pierre R.N   Pt Education: Sedated and ventilated   Care Plan: HD as ordered   Pts outpatient clinic: [facility] Summary   Comments:    1300: Patient's consent, code status, medications checked. 1305: Safety checks complete, time out performed. assessed, +bruit/thrill, no redness, warmth or drainage noted. Skin prepped per procedure using chloraprep scrub x 30 sec, followed by 30 sec dry time each site. Cannulated using 15g needles each site and secured with tape. +flash/aspiration/flush. 1313: HD initiated. Access visible, lines secure. Medications reviewed. Patients Bp dropped down, albumin started slowly, PN bumped up  Pressors. 1340: patient BP is unstable, informed Dr. Tian Cohen, rinsed back all blood. Patient is for rakel cathter insertion, for CRRT. 1350: De-cannulated and pressure held until hemostasis was achieved. No bleeding noted. Dressing applied. +bruit/thrill. VSS. SBAR called to primary RN. Comfort and care rendered, needs attended, questions answered. Call bell within reach, bed in lowest position.   Report given to Dionte Pierre R.N.

## 2021-12-07 NOTE — PROGRESS NOTES
Chart reviewed. Pt transferred to CCU, medically unstable and now intubated. Received canceled order from MD, will complete all active orders.

## 2021-12-07 NOTE — PROGRESS NOTES
Bedside and Verbal shift change report given to WILDER García, RN (oncoming nurse) by Tierney Keating. Lianne Morfin and Ángel Bergman RN (offgoing nurse). Report included the following information SBAR, Kardex, Intake/Output, MAR, Recent Results and Cardiac Rhythm NSR.   1930: Assessment completed. Levophed @ 20mcg/min, increased Olegario-Synephrine to 100mcg/min to maintain MAP>65. Propofol @ 50mcg/kg/min, Fentanyl @ 50mcg/hr. PRBC infusion completed, flushing with NS.   2350: Reassessment completed. See flowsheet. 0008:  Propofol off x 5 minutes for neuro assessment. RR 30, Patient opens eyes and moves all extremities spontaneously. No command following. Propofol restarted at 20mcg/kg/min.   0300: Notified Leo Grey, NP of H/H 4.8/14.4, platelet 47, INR 1.6 Orders to transfuse 2 units PRBC, 1 unit FFP, 1 unit platelets. 2689: Notified NP of lactic 9.1.   0345: Plan for CTA abdomen in about an hour (after solu-medrol and bendaryl given pre-med for contrast allergy.)   0411: Patient's tablet and cell phone placed in black backpack in patient's closet.

## 2021-12-07 NOTE — PROGRESS NOTES
42year old pt admitted with covid PNA and gi bleed, s/p EGD on 12/6 with injection of epi but unable to assess entire stomach and duodenum due to large amount of clot and blood. 3 am: Called to bedside because pt's Hg dropped from 7.2 to 4.8 in 6 hours, plt 47. Lactic Acid 9.3. Also having increase in vasopressor requirements. 2 units PRBC, 1 unit plt, and 1 unit FFP ordered. CTA abd ordered to assess for active bleed. If CTA shows source of active bleed with call IR for embolization. Pt unfortunately has a contrast allergy (shortness of breath) so premedicated with 125 of solumedrol, 50 of benadryl, and pepcid. Spoke with on call gi physician, Dr. Adam Mock and he is in agreement with plan.     Thony Rangel NP  [facility]  12/7/21

## 2021-12-07 NOTE — PROGRESS NOTES
Occupational Therapy    Chart reviewed. Pt transferred to CCU, medically unstable and now intubated. Noted PT received canceled order from MD, OT will complete all active orders.      Thank you  Catie Woodruff OT

## 2021-12-08 ENCOUNTER — APPOINTMENT (OUTPATIENT)
Dept: CT IMAGING | Age: 46
DRG: 207 | End: 2021-12-08
Attending: INTERNAL MEDICINE
Payer: MEDICARE

## 2021-12-08 ENCOUNTER — APPOINTMENT (OUTPATIENT)
Dept: GENERAL RADIOLOGY | Age: 46
DRG: 207 | End: 2021-12-08
Attending: INTERNAL MEDICINE
Payer: MEDICARE

## 2021-12-08 LAB
ALBUMIN SERPL-MCNC: 2.1 G/DL (ref 3.5–5)
ALBUMIN/GLOB SERPL: 0.7 {RATIO} (ref 1.1–2.2)
ALP SERPL-CCNC: 102 U/L (ref 45–117)
ALT SERPL-CCNC: 266 U/L (ref 12–78)
ANION GAP SERPL CALC-SCNC: 10 MMOL/L (ref 5–15)
ANION GAP SERPL CALC-SCNC: 14 MMOL/L (ref 5–15)
AST SERPL-CCNC: 442 U/L (ref 15–37)
ATRIAL RATE: 57 BPM
BILIRUB SERPL-MCNC: 1 MG/DL (ref 0.2–1)
BLD PROD TYP BPU: NORMAL
BLD PROD TYP BPU: NORMAL
BPU ID: NORMAL
BPU ID: NORMAL
BUN SERPL-MCNC: 60 MG/DL (ref 6–20)
BUN SERPL-MCNC: 68 MG/DL (ref 6–20)
BUN/CREAT SERPL: 13 (ref 12–20)
BUN/CREAT SERPL: 14 (ref 12–20)
CALCIUM SERPL-MCNC: 8.2 MG/DL (ref 8.5–10.1)
CALCIUM SERPL-MCNC: 8.4 MG/DL (ref 8.5–10.1)
CALCULATED P AXIS, ECG09: 74 DEGREES
CALCULATED R AXIS, ECG10: 39 DEGREES
CALCULATED T AXIS, ECG11: 51 DEGREES
CHLORIDE SERPL-SCNC: 101 MMOL/L (ref 97–108)
CHLORIDE SERPL-SCNC: 103 MMOL/L (ref 97–108)
CO2 SERPL-SCNC: 21 MMOL/L (ref 21–32)
CO2 SERPL-SCNC: 25 MMOL/L (ref 21–32)
COMMENT, HOLDF: NORMAL
COMMENT, HOLDF: NORMAL
CORTIS SERPL-MCNC: 15.5 UG/DL
CREAT SERPL-MCNC: 4.49 MG/DL (ref 0.7–1.3)
CREAT SERPL-MCNC: 4.98 MG/DL (ref 0.7–1.3)
DIAGNOSIS, 93000: NORMAL
ERYTHROCYTE [DISTWIDTH] IN BLOOD BY AUTOMATED COUNT: 19 % (ref 11.5–14.5)
ERYTHROCYTE [DISTWIDTH] IN BLOOD BY AUTOMATED COUNT: 19.4 % (ref 11.5–14.5)
GLOBULIN SER CALC-MCNC: 3.1 G/DL (ref 2–4)
GLUCOSE SERPL-MCNC: 120 MG/DL (ref 65–100)
GLUCOSE SERPL-MCNC: 125 MG/DL (ref 65–100)
HCT VFR BLD AUTO: 19.1 % (ref 36.6–50.3)
HCT VFR BLD AUTO: 22.2 % (ref 36.6–50.3)
HGB BLD-MCNC: 6.5 G/DL (ref 12.1–17)
HGB BLD-MCNC: 7.5 G/DL (ref 12.1–17)
HISTORY CHECKED?,CKHIST: NORMAL
INR PPP: 1.2 (ref 0.9–1.1)
LACTATE SERPL-SCNC: 4.4 MMOL/L (ref 0.4–2)
MAGNESIUM SERPL-MCNC: 2.2 MG/DL (ref 1.6–2.4)
MCH RBC QN AUTO: 28.8 PG (ref 26–34)
MCH RBC QN AUTO: 29.5 PG (ref 26–34)
MCHC RBC AUTO-ENTMCNC: 33.8 G/DL (ref 30–36.5)
MCHC RBC AUTO-ENTMCNC: 34 G/DL (ref 30–36.5)
MCV RBC AUTO: 85.4 FL (ref 80–99)
MCV RBC AUTO: 86.8 FL (ref 80–99)
NRBC # BLD: 0.32 K/UL (ref 0–0.01)
NRBC # BLD: 0.39 K/UL (ref 0–0.01)
NRBC BLD-RTO: 1.3 PER 100 WBC
NRBC BLD-RTO: 1.7 PER 100 WBC
P-R INTERVAL, ECG05: 196 MS
PHOSPHATE SERPL-MCNC: 4.9 MG/DL (ref 2.6–4.7)
PLATELET # BLD AUTO: 67 K/UL (ref 150–400)
PLATELET # BLD AUTO: 76 K/UL (ref 150–400)
POTASSIUM SERPL-SCNC: 4.7 MMOL/L (ref 3.5–5.1)
POTASSIUM SERPL-SCNC: 4.9 MMOL/L (ref 3.5–5.1)
PROCALCITONIN SERPL-MCNC: 52.55 NG/ML
PROT SERPL-MCNC: 5.2 G/DL (ref 6.4–8.2)
PROTHROMBIN TIME: 12.3 SEC (ref 9–11.1)
Q-T INTERVAL, ECG07: 568 MS
QRS DURATION, ECG06: 112 MS
QTC CALCULATION (BEZET), ECG08: 552 MS
RBC # BLD AUTO: 2.2 M/UL (ref 4.1–5.7)
RBC # BLD AUTO: 2.6 M/UL (ref 4.1–5.7)
SAMPLES BEING HELD,HOLD: NORMAL
SAMPLES BEING HELD,HOLD: NORMAL
SODIUM SERPL-SCNC: 136 MMOL/L (ref 136–145)
SODIUM SERPL-SCNC: 138 MMOL/L (ref 136–145)
STATUS OF UNIT,%ST: NORMAL
STATUS OF UNIT,%ST: NORMAL
UNIT DIVISION, %UDIV: 0
UNIT DIVISION, %UDIV: 0
VENTRICULAR RATE, ECG03: 57 BPM
WBC # BLD AUTO: 22.8 K/UL (ref 4.1–11.1)
WBC # BLD AUTO: 24.2 K/UL (ref 4.1–11.1)

## 2021-12-08 PROCEDURE — 74011250636 HC RX REV CODE- 250/636: Performed by: INTERNAL MEDICINE

## 2021-12-08 PROCEDURE — 83605 ASSAY OF LACTIC ACID: CPT

## 2021-12-08 PROCEDURE — 85027 COMPLETE CBC AUTOMATED: CPT

## 2021-12-08 PROCEDURE — 74011000250 HC RX REV CODE- 250: Performed by: INTERNAL MEDICINE

## 2021-12-08 PROCEDURE — 76040000007: Performed by: INTERNAL MEDICINE

## 2021-12-08 PROCEDURE — P9016 RBC LEUKOCYTES REDUCED: HCPCS

## 2021-12-08 PROCEDURE — 85610 PROTHROMBIN TIME: CPT

## 2021-12-08 PROCEDURE — 90945 DIALYSIS ONE EVALUATION: CPT

## 2021-12-08 PROCEDURE — 94003 VENT MGMT INPAT SUBQ DAY: CPT

## 2021-12-08 PROCEDURE — 74011000258 HC RX REV CODE- 258: Performed by: INTERNAL MEDICINE

## 2021-12-08 PROCEDURE — 71045 X-RAY EXAM CHEST 1 VIEW: CPT

## 2021-12-08 PROCEDURE — 93005 ELECTROCARDIOGRAM TRACING: CPT

## 2021-12-08 PROCEDURE — 80053 COMPREHEN METABOLIC PANEL: CPT

## 2021-12-08 PROCEDURE — 36415 COLL VENOUS BLD VENIPUNCTURE: CPT

## 2021-12-08 PROCEDURE — 65610000006 HC RM INTENSIVE CARE

## 2021-12-08 PROCEDURE — 84145 PROCALCITONIN (PCT): CPT

## 2021-12-08 PROCEDURE — 74176 CT ABD & PELVIS W/O CONTRAST: CPT

## 2021-12-08 PROCEDURE — 77030040922 HC BLNKT HYPOTHRM STRY -A

## 2021-12-08 PROCEDURE — 77010033678 HC OXYGEN DAILY

## 2021-12-08 PROCEDURE — 74011250637 HC RX REV CODE- 250/637: Performed by: INTERNAL MEDICINE

## 2021-12-08 PROCEDURE — 2709999900 HC NON-CHARGEABLE SUPPLY

## 2021-12-08 PROCEDURE — 36430 TRANSFUSION BLD/BLD COMPNT: CPT

## 2021-12-08 PROCEDURE — 2709999900 HC NON-CHARGEABLE SUPPLY: Performed by: INTERNAL MEDICINE

## 2021-12-08 PROCEDURE — C9113 INJ PANTOPRAZOLE SODIUM, VIA: HCPCS | Performed by: INTERNAL MEDICINE

## 2021-12-08 PROCEDURE — 82533 TOTAL CORTISOL: CPT

## 2021-12-08 PROCEDURE — 99222 1ST HOSP IP/OBS MODERATE 55: CPT | Performed by: SURGERY

## 2021-12-08 PROCEDURE — 83735 ASSAY OF MAGNESIUM: CPT

## 2021-12-08 PROCEDURE — 84100 ASSAY OF PHOSPHORUS: CPT

## 2021-12-08 PROCEDURE — 74018 RADEX ABDOMEN 1 VIEW: CPT

## 2021-12-08 RX ORDER — SODIUM CHLORIDE 0.9 % (FLUSH) 0.9 %
5-40 SYRINGE (ML) INJECTION EVERY 8 HOURS
Status: DISCONTINUED | OUTPATIENT
Start: 2021-12-08 | End: 2021-12-10 | Stop reason: SDUPTHER

## 2021-12-08 RX ORDER — DEXTROMETHORPHAN/PSEUDOEPHED 2.5-7.5/.8
1.2 DROPS ORAL
Status: DISCONTINUED | OUTPATIENT
Start: 2021-12-08 | End: 2021-12-24 | Stop reason: HOSPADM

## 2021-12-08 RX ORDER — FENTANYL CITRATE 50 UG/ML
100 INJECTION, SOLUTION INTRAMUSCULAR; INTRAVENOUS ONCE
Status: COMPLETED | OUTPATIENT
Start: 2021-12-08 | End: 2021-12-08

## 2021-12-08 RX ORDER — SODIUM CHLORIDE 9 MG/ML
250 INJECTION, SOLUTION INTRAVENOUS AS NEEDED
Status: DISCONTINUED | OUTPATIENT
Start: 2021-12-08 | End: 2021-12-10 | Stop reason: ALTCHOICE

## 2021-12-08 RX ORDER — FLUMAZENIL 0.1 MG/ML
0.2 INJECTION INTRAVENOUS
Status: ACTIVE | OUTPATIENT
Start: 2021-12-08 | End: 2021-12-08

## 2021-12-08 RX ORDER — SODIUM CHLORIDE 0.9 % (FLUSH) 0.9 %
5-40 SYRINGE (ML) INJECTION AS NEEDED
Status: DISCONTINUED | OUTPATIENT
Start: 2021-12-08 | End: 2021-12-10 | Stop reason: SDUPTHER

## 2021-12-08 RX ORDER — MIDAZOLAM HYDROCHLORIDE 1 MG/ML
.25-1 INJECTION, SOLUTION INTRAMUSCULAR; INTRAVENOUS
Status: ACTIVE | OUTPATIENT
Start: 2021-12-08 | End: 2021-12-08

## 2021-12-08 RX ORDER — EPINEPHRINE 0.1 MG/ML
1 INJECTION INTRACARDIAC; INTRAVENOUS
Status: ACTIVE | OUTPATIENT
Start: 2021-12-08 | End: 2021-12-09

## 2021-12-08 RX ORDER — NALOXONE HYDROCHLORIDE 0.4 MG/ML
0.4 INJECTION, SOLUTION INTRAMUSCULAR; INTRAVENOUS; SUBCUTANEOUS
Status: ACTIVE | OUTPATIENT
Start: 2021-12-08 | End: 2021-12-08

## 2021-12-08 RX ORDER — NOREPINEPHRINE BITARTRATE/D5W 8 MG/250ML
.5-3 PLASTIC BAG, INJECTION (ML) INTRAVENOUS
Status: DISCONTINUED | OUTPATIENT
Start: 2021-12-08 | End: 2021-12-13 | Stop reason: ALTCHOICE

## 2021-12-08 RX ORDER — FENTANYL CITRATE 50 UG/ML
25 INJECTION, SOLUTION INTRAMUSCULAR; INTRAVENOUS
Status: DISCONTINUED | OUTPATIENT
Start: 2021-12-08 | End: 2021-12-13

## 2021-12-08 RX ORDER — SODIUM CHLORIDE 9 MG/ML
100 INJECTION, SOLUTION INTRAVENOUS CONTINUOUS
Status: ACTIVE | OUTPATIENT
Start: 2021-12-08 | End: 2021-12-08

## 2021-12-08 RX ORDER — ATROPINE SULFATE 0.1 MG/ML
0.5 INJECTION INTRAVENOUS
Status: ACTIVE | OUTPATIENT
Start: 2021-12-08 | End: 2021-12-09

## 2021-12-08 RX ADMIN — PIPERACILLIN AND TAZOBACTAM 3.38 G: 3; .375 INJECTION, POWDER, LYOPHILIZED, FOR SOLUTION INTRAVENOUS at 02:06

## 2021-12-08 RX ADMIN — FENTANYL CITRATE 50 MCG: 0.05 INJECTION, SOLUTION INTRAMUSCULAR; INTRAVENOUS at 13:19

## 2021-12-08 RX ADMIN — Medication 10 ML: at 21:08

## 2021-12-08 RX ADMIN — CALCIUM CHLORIDE, MAGNESIUM CHLORIDE, DEXTROSE MONOHYDRATE, LACTIC ACID, SODIUM CHLORIDE, SODIUM BICARBONATE AND POTASSIUM CHLORIDE: 5.15; 2.03; 22; 5.4; 6.46; 3.09; .157 INJECTION INTRAVENOUS at 10:16

## 2021-12-08 RX ADMIN — PROPOFOL 35 MCG/KG/MIN: 10 INJECTION, EMULSION INTRAVENOUS at 13:52

## 2021-12-08 RX ADMIN — PROPOFOL 30 MCG/KG/MIN: 10 INJECTION, EMULSION INTRAVENOUS at 16:48

## 2021-12-08 RX ADMIN — Medication 40 ML: at 21:08

## 2021-12-08 RX ADMIN — PIPERACILLIN AND TAZOBACTAM 3.38 G: 3; .375 INJECTION, POWDER, LYOPHILIZED, FOR SOLUTION INTRAVENOUS at 09:23

## 2021-12-08 RX ADMIN — Medication 40 ML: at 05:56

## 2021-12-08 RX ADMIN — FENTANYL CITRATE 50 MCG: 0.05 INJECTION, SOLUTION INTRAMUSCULAR; INTRAVENOUS at 13:28

## 2021-12-08 RX ADMIN — CHLORHEXIDINE GLUCONATE 15 ML: 1.2 RINSE ORAL at 21:07

## 2021-12-08 RX ADMIN — METOCLOPRAMIDE HYDROCHLORIDE 5 MG: 5 INJECTION INTRAMUSCULAR; INTRAVENOUS at 21:07

## 2021-12-08 RX ADMIN — Medication 1 AMPULE: at 08:49

## 2021-12-08 RX ADMIN — Medication 1 AMPULE: at 21:07

## 2021-12-08 RX ADMIN — CHLORHEXIDINE GLUCONATE 15 ML: 1.2 RINSE ORAL at 08:49

## 2021-12-08 RX ADMIN — Medication 125 MCG/HR: at 16:49

## 2021-12-08 RX ADMIN — SODIUM CHLORIDE 40 MG: 9 INJECTION INTRAMUSCULAR; INTRAVENOUS; SUBCUTANEOUS at 21:07

## 2021-12-08 RX ADMIN — SODIUM CHLORIDE 40 MG: 9 INJECTION INTRAMUSCULAR; INTRAVENOUS; SUBCUTANEOUS at 08:49

## 2021-12-08 RX ADMIN — METOCLOPRAMIDE HYDROCHLORIDE 5 MG: 5 INJECTION INTRAMUSCULAR; INTRAVENOUS at 05:56

## 2021-12-08 RX ADMIN — PROPOFOL 30 MCG/KG/MIN: 10 INJECTION, EMULSION INTRAVENOUS at 04:48

## 2021-12-08 RX ADMIN — Medication 10 ML: at 15:12

## 2021-12-08 RX ADMIN — PIPERACILLIN AND TAZOBACTAM 3.38 G: 3; .375 INJECTION, POWDER, LYOPHILIZED, FOR SOLUTION INTRAVENOUS at 17:27

## 2021-12-08 RX ADMIN — NOREPINEPHRINE BITARTRATE 2 MCG/MIN: 1 INJECTION, SOLUTION, CONCENTRATE INTRAVENOUS at 10:04

## 2021-12-08 RX ADMIN — Medication 10 ML: at 13:08

## 2021-12-08 RX ADMIN — CALCIUM CHLORIDE, MAGNESIUM CHLORIDE, DEXTROSE MONOHYDRATE, LACTIC ACID, SODIUM CHLORIDE, SODIUM BICARBONATE AND POTASSIUM CHLORIDE: 5.15; 2.03; 22; 5.4; 6.46; 3.09; .157 INJECTION INTRAVENOUS at 19:28

## 2021-12-08 RX ADMIN — EPOETIN ALFA-EPBX 12000 UNITS: 10000 INJECTION, SOLUTION INTRAVENOUS; SUBCUTANEOUS at 21:07

## 2021-12-08 RX ADMIN — CALCIUM CHLORIDE, MAGNESIUM CHLORIDE, DEXTROSE MONOHYDRATE, LACTIC ACID, SODIUM CHLORIDE, SODIUM BICARBONATE AND POTASSIUM CHLORIDE: 5.15; 2.03; 22; 5.4; 6.46; 3.09; .157 INJECTION INTRAVENOUS at 02:43

## 2021-12-08 RX ADMIN — CALCIUM CHLORIDE, MAGNESIUM CHLORIDE, DEXTROSE MONOHYDRATE, LACTIC ACID, SODIUM CHLORIDE, SODIUM BICARBONATE AND POTASSIUM CHLORIDE: 5.15; 2.03; 22; 5.4; 6.46; 3.09; .157 INJECTION INTRAVENOUS at 10:17

## 2021-12-08 RX ADMIN — CALCIUM CHLORIDE, MAGNESIUM CHLORIDE, DEXTROSE MONOHYDRATE, LACTIC ACID, SODIUM CHLORIDE, SODIUM BICARBONATE AND POTASSIUM CHLORIDE: 5.15; 2.03; 22; 5.4; 6.46; 3.09; .157 INJECTION INTRAVENOUS at 10:15

## 2021-12-08 RX ADMIN — CALCITRIOL CAPSULES 0.25 MCG 0.5 MCG: 0.25 CAPSULE ORAL at 09:18

## 2021-12-08 RX ADMIN — METOCLOPRAMIDE HYDROCHLORIDE 5 MG: 5 INJECTION INTRAMUSCULAR; INTRAVENOUS at 16:40

## 2021-12-08 RX ADMIN — PROPOFOL 35 MCG/KG/MIN: 10 INJECTION, EMULSION INTRAVENOUS at 12:20

## 2021-12-08 RX ADMIN — Medication 125 MCG/HR: at 11:23

## 2021-12-08 NOTE — PROGRESS NOTES
Comprehensive Nutrition Assessment    Type and Reason for Visit: Reassess    Nutrition Recommendations/Plan:   If able, recommend start TF via OGT:     Start Nepro @ 10mL/h, advance rate as tolerated to Goal Rate of 30mL/h + 2 Prosource TID + 50mL flush q 4h (provides 1536kcals/124gPro/116gCHO/823mL)     Nutrition Assessment:   Chart reviewed, case discussed during CCU rounds. Pt intubated and sedated with propofol @ 13.7mL/h, which provides 362 kcals daily. OGT to suction. Pt s/p EGD earlier this afternoon which found severe distal esophageal ulceration. Levo at 3. Pt is on CVVH. Provided TF recommendations above. If unable to start TF in the next 24h, recommend starting TPN as PO intake was quite poor prior to moving to CCU. Estimated Daily Nutrient Needs:  Energy (kcal): PSU 3647 (MSJ 8744); Weight Used for Energy Requirements: Current  Protein (g): 86-143g (1.2-2gPro/kg); Weight Used for Protein Requirements: Current  Fluid (ml/day): 1500mL; Method Used for Fluid Requirements: Standard renal      Nutrition Related Findings:  Meds: calcitrol, reglan, protonix, [email or web address], [email or web address]; Drips: levo, fentanyl, propofol. Edema: +3 pitting-BLE, +1 pitting-BUE. BM 12/8      Wounds:    None       Current Nutrition Therapies:  No diet orders on file    Anthropometric Measures:  · Height:  5' 7\" (170.2 cm)  · Current Body Wt:  71.7 kg (158 lb 1.1 oz)    · Ideal Body Wt:  148 lbs:  94.1 %   · BMI Category:  Normal weight (BMI 18.5-24. 9)       Nutrition Diagnosis:   · Inadequate protein-energy intake related to renal dysfunction, increased demand for energy/nutrients (poor appetite) as evidenced by intake 0-25% (ESRD on HD)  Previous dx continues.      Nutrition Interventions:   Food and/or Nutrient Delivery: Start tube feeding, Start parenteral nutrition  Nutrition Education and Counseling: No recommendations at this time  Coordination of Nutrition Care: Continue to monitor while inpatient, Interdisciplinary rounds    Goals:  Pt will be started on nutrition support in 1-3 days. Nutrition Monitoring and Evaluation:   Behavioral-Environmental Outcomes: None identified  Food/Nutrient Intake Outcomes: IVF intake  Physical Signs/Symptoms Outcomes: Biochemical data, GI status, Fluid status or edema, Weight    Discharge Planning:     Too soon to determine     Electronically signed by Catrachita Serrano RD, [address] [place]  on 12/8/2021 at 2:37 PM    Contact: [phone]

## 2021-12-08 NOTE — PROGRESS NOTES
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 He was admitted to Hospitalist Service with Acute COVID infection, mild hypoxemic respiratory failure, recent strep intermedius bacteremia. Transferred to ICU  after developing hematemesis and shock. PRINCIPLE ICU DIAGNOSIS:  Hemorrhagic shock due to UGIB    Hospital course/major results:   Admission as above   Transferred to ICU for hematemesis, shock. Intubated semi-electively for EGD. Required multiple units RBC, FFP, plts. EGD revealed small distal esophageal ulcers and large volume of blood and clots in stomach without clear source of bleeding identified. Required vasopressors. Continued blood loss into night requiring more blood products.  CTAP: 1. There is a large amount of hemorrhage within a distended stomach, extending into the duodenum. An active bleed is not seen on this examination. However, delayed images were not performed, somewhat limiting the study. 2. Bilateral pleural effusions with bilateral pneumonia.  L femoral HD catheter palced and CRRT initiated   Remains intubated and on CRRT. Further bleeding with Hgb 6.5. Received one unit RBCs. Repeat EGD: Severe distal esophageal ulceration which is likely cause of bleeding, 2. Stomach full of blood---mucosa not well seen 3. Duodenum is normal. Worsening abdominal distention. Repeat CTAP: Generalized gaseous distention of large and small bowel. Findings are consistent with a generalized ileus. 2. No evidence of bowel perforation. 3. Continued gastric distention with hemorrhagic material, similar to prior study. 4. NG tube in the stomach.  5. Covid 19 pneumonia has worsened in the visualized lung bases. 6. Continued small to moderate bilateral pleural effusions. 12/08 Palliative Care consultation requested      Lines/tubes/devices:  ETT 12/06 >>   R femoral CVL 12/06 >>   L femoral HD catheter 12/07 >>       COMPREHENSIVE CCM ASSESSMENT/PLAN (by systems)       PULMONARY:  1. Vent dep respiratory failure - intubated for EGD  2. B pulmonary infiltrates - likely aspiration    Current vent settings:       PLAN   - Ventilator settings established/reviewed with RT and adjusted as indicated  - Lung protection ventilator strategy:    Maintain Vt 4-8 cc/kg IBW   Maintain Pplat < 30 cm H2O if possible   Maintain driving P < 15 cm P1X if possible   Optimize PEEP   Minimize O2 maintaining SpO2 > 90%  - Daily SBT if/when daily screening criteria met      CARDIOVASCULAR/HEMODYNAMIC:  3. Shock - hemorrhagic. Possibly septic    Current vasopressors: NE 14 mcg/min      PLAN  - cardiac/hemodynamic monitoring  - MAP goal > 60 mmHg  - SBP goal > 100 mmHg      RENAL:  4. ESRD  5. Mild metabolic acidosis  6. Mild hyperkalemia    Current RRT:     PLAN  - Monitor chemistry panel daily  - Correct electrolytes as indicated  - Nephrology following  - CRRT per Nephrology - initiated 12/07    GI/NUTRITION:  7. Acute UGIB  8. Distal esophageal ulceration  9. Protein-calorie malnutrition      Current nutritional support: none  SUP: high dose IV PPI    PLAN  - Cont high dose PPI  - Gastroenterology following - their assistance appreciated  - General Surgery consultation 12/08 - no indication for surgery  - Will initiate TPN 12/09      INFECTIOUS DISEASE  10. Suspect aspiration PNA  11. Severe sepsis    Micro:  Resp 12/07 >>    Antibiotics:  Pip-tazo 12/07 >>     PLAN  - Follow culture results to completion  - Duration of antimicrobial therapy TBD  - Track PCT    HEME  12. ABLA  13. Thrombocytopenia  14.  H/O DVT on chronic warafarin    DVT prophylaxis: SCDs - has IVC filter    PLAN  - Daily CBC  - Transfuse as needed to maintain Hgb > 7.0 gm/dL or for hemodynamically significant bleeding  - Transfuse plts to maintain > 50k  - Transfuse FFP to maintain INR < 1.5  - Holding warfarin    NEURO  15. Cognition fully intact prior to admission  16. ICU/vent associated discomfort      RASS goal: -1, -2  Current sedatives: propofol  Current analgesics: fentanyl    PLAN   - Daily SAT when meets ICU criteria  - ABCDEF mobility bundle  - PT/OT involvement when appropriate      ENDOCRINE  17. Mild hyperglycemia without prior dx of DM    PLAN  - Target glucose: 100-180  - Glycemic control: N/I presently    GOALS OF CARE/ADVANCED DIRECTIVES  18. CODE STATUS: Full   19. HPI/Consult/Subjective:   24 Hr Events 12/8/2021:   As above    SUBJ:   RASS -4, not F/C, synchronous with vent      Past Medical History:      has a past medical history of Abdominal hematoma, AICD (automatic cardioverter/defibrillator) present, CAD (coronary artery disease), Chronic abdominal pain, Chronic kidney disease, DVT (deep venous thrombosis) ([address] (2001), DVT of popliteal vein ([address] (11/2011), Endocarditis, Gallstone pancreatitis, Gastrointestinal disorder, Gastrointestinal disorder, Hemodialysis patient ([address] High cholesterol, Hypertension, Kidney transplant, Long term current use of anticoagulant therapy, Nephrotic syndrome, Other ill-defined conditions(799.89), Other ill-defined conditions(799.89), Other ill-defined conditions(799.89), Peritonitis ([address] Seizures ([address] (2015), Small bowel obstruction ([address] 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[address] and V-tach ([address]     Past Surgical History:      has a past surgical history that includes pr edg us exam surgical alter stom duodenum/jejunum (7/15/2011); pr esophagogastroduodenoscopy transoral diagnostic (5/24/2012); hx cholecystectomy; hx appendectomy; hx small bowel resection; hx other surgical (11/2011); hx other surgical; hx other surgical (02/2013); hx transplant (2001 ); hx transplant (1992); vascular surgery procedure unlist (11/14/2017); hx pacemaker (Left, 6/2009); hx pacemaker (Left); pr cardiac surg procedure unlist (2007); hx vascular access (Right); ir replace cvc tunneled w/o port (9/10/2020); ir remove tunl cvad w/o port / pump (10/29/2020); upper gi endoscopy,ctrl bleed (12/6/2021); ir insert non tunl cvc over 5 yrs (12/7/2021); and upper gi endoscopy,diagnosis (12/8/2021). Home Medications:     Prior to Admission medications    Medication Sig Start Date End Date Taking? Authorizing Provider   vancomycin in 0.9% sodium chloride (VANCOCIN) 1 gram/200 mL pgbk 150 mL by IntraVENous route every Monday, Wednesday, Friday for 8 doses. After hemodialysis treatment 11/19/21 12/7/21  Bibiana Sahu MD   ondansetron (Zofran ODT) 4 mg disintegrating tablet 1 Tab by SubLINGual route every eight (8) hours as needed for Nausea or Vomiting. 3/27/21   Ruby Troy MD   metoprolol succinate (TOPROL-XL) 25 mg XL tablet Take 0.5 Tabs by mouth daily. 11/10/20   Biagio Duverney, MD   calcium acetate,phosphat bind, (PHOSLO) 667 mg cap Take 2 Caps by mouth three (3) times daily (with meals). Indications: low amount of calcium in the blood, renal osteodystrophy with hyperphosphatemia 11/10/20   Biagio Duverney, MD   atorvastatin (Lipitor) 20 mg tablet Take 20 mg by mouth daily. Provider, Historical   warfarin (COUMADIN) 2.5 mg tablet Take 2.5 mg by mouth daily. Provider, Historical   acetaminophen (TYLENOL) 500 mg tablet Take 1 Tab by mouth every four (4) hours as needed for Pain. Over the counter 1/20/19   Jatinder Page MD   omeprazole (PRILOSEC) 20 mg capsule Take 20 mg by mouth daily. Provider, Historical   calcitRIOL (ROCALTROL) 0.5 mcg capsule Take 0.5 mcg by mouth daily. Provider, Historical   aspirin 81 mg chewable tablet Take 81 mg by mouth daily. Other, MD Morro       Allergies/Social/Family History:      Allergies   Allergen Reactions    Shellfish Containing Products Swelling Break out in rash, trouble breathing    Contrast Agent [Iodine] Shortness of Breath    Heparin Analogues Other (comments)     Positive history of HIT      Social History     Tobacco Use    Smoking status: Never Smoker    Smokeless tobacco: Never Used   Substance Use Topics    Alcohol use: No      Family History   Problem Relation Age of Onset    COPD Mother     Lung Disease Mother     Cancer Father         stomach    Cancer Maternal Grandmother            Objective:   Vital Signs:  Visit Vitals  /74   Pulse 81   Temp 98.3 °F (36.8 °C)   Resp 20   Ht 5' 7\" (1.702 m)   Wt 71.7 kg (158 lb 1.1 oz)   SpO2 98%   BMI 24.76 kg/m²    O2 Flow Rate (L/min): 2 l/min O2 Device: Ventilator, Endotracheal tube Temp (24hrs), Av.3 °F (35.7 °C), Min:90.8 °F (32.7 °C), Max:98.5 °F (36.9 °C)           Intake/Output:     Intake/Output Summary (Last 24 hours) at 2021 1755  Last data filed at 2021 1600  Gross per 24 hour   Intake 1378.57 ml   Output 1498 ml   Net -119.43 ml       Physical Exam:  GEN: chronically ill appearing RASS -4, not F/C, synchronous with vent  HEENT: NCAT, sclerae white  NECK: No JVD noted  CHEST: Clear to auscultation and percussion  CARDIAC: sinus rhythm, regular, no murmur noted  ABD: Markedly distended, taut, + tympani, hypoactive BS  EXT: Symmetric LE edema  NEURO: Cranial nerves intact, symmetric strength, no focal deficits noted  DERM: No lesions noted    I have examined the patient on this day 2021 and the above documented exam is accurate including the components that have been copied forward    LABS AND  DATA: Personally reviewed  Recent Labs     21  1448 21  0418   WBC 22.8* 24.2*   HGB 6.5* 7.5*   HCT 19.1* 22.2*   PLT 67* 76*     Recent Labs     21  1448 21  0418    136   K 4.7 4.9    101   CO2 21 25   BUN 60* 68*   CREA 4.49* 4.98*   * 120*   CA 8.2* 8.4*   MG 2.2  --    PHOS 4.9*  --      Recent Labs     21  0418 12/06/21  0939     --    TP 5.2*  --    ALB 2.1*  --    GLOB 3.1  --    LPSE  --  1,177*     Recent Labs     12/08/21  0418 12/07/21  0206   INR 1.2* 1.6*   PTP 12.3* 15.9*      Recent Labs     12/06/21  1242   PHI 7.45   PCO2I 32.3*   PO2I 65*   FIO2I 1     No results for input(s): CPK, CKMB, TROIQ, BNPP in the last 72 hours. Hemodynamics:   PAP:   CO:     Wedge:   CI:     CVP:    SVR:       PVR:       Ventilator Settings:  Mode Rate Tidal Volume Pressure FiO2 PEEP   PRVC   400 ml    25 % 5 cm H20     Peak airway pressure: 17 cm H2O    Minute ventilation: 8.2 l/min        MEDS: Reviewed    Chest X-Ray:  CXR Results  (Last 48 hours)               12/08/21 1513  XR CHEST PORT Final result    Impression:  NG tube is coiled in the patient's throat, and terminates in the mid/distal   thoracic esophagus. Recommend repositioning. Narrative:  INDICATION:    OGT placement        EXAMINATION:  AP CHEST, PORTABLE       COMPARISON: 12/7/2021       FINDINGS: Single AP portable view of the chest at 1456 hours demonstrates an NG   tube which appears coiled in the patient's mouth, and its tip is in the location   of the mid/distal thoracic esophagus, approximately 17 mm superior to the   gastric air bubble in the left midabdomen. ET tube is not significantly changed. Covid 19 pneumonia is again noted. The cardiomediastinal silhouette is stable. There is no new airspace disease. 12/07/21 0500  XR CHEST PORT Final result    Impression:  No change. Narrative:  Clinical indication: Respiratory distress. Portable AP semiupright view of the chest obtained, comparison December 6. ET   tube is in place above the leo. Patchy infiltrates unchanged.                   I have reviewed the above films and agree with official interpretation         Multidisciplinary Rounds Completed:  Yes      SPECIAL EQUIPMENT  CRRT    DISPOSITION  Stay in ICU    CRITICAL CARE CONSULTANT NOTE  I had a in-person encounter with Rg Phillips, reviewed and interpreted patient data including events, labs, images, vital signs, I/O's, and examined patient. I have discussed the case and the plan and management of the patient's care with the consulting services, the bedside nurses and the respiratory therapist.      NOTE OF PERSONAL INVOLVEMENT IN CARE   This patient is at high risk for sudden and clinically significant deterioration, which requires the highest level of preparedness to intervene urgently. I participated in the decision-making and personally managed or directed the management of the following life and organ supporting interventions that required my frequent assessment to treat or prevent imminent deterioration. I personally spent 50 minutes of critical care time. This is time spent at patient's bedside actively involved in patient care as well as the coordination of care and discussions with the patient's family. This does not include any procedural time which has been billed separately.     Patience Wylie MD  Pulmonary/[facility] Critical [facility]  [phone]  12/8/2021

## 2021-12-08 NOTE — PROGRESS NOTES
Bedside and Verbal shift change report given to WILDER Perea RN (oncoming nurse) by Flaco Walker and Didi Dunbar, MANUELITO (offgoing nurse). Report included the following information SBAR, Kardex, Intake/Output, MAR, Recent Results and Cardiac Rhythm NSR.   1950: Assessment completed. Tolerating CRRT. Levophed titrated to maintain MAP>65. PRBC infusion in progress. 2230: Blood for CBC drawn and sent. 0000: Reassessment unchanged. Hgb 8.2. Levophed @ 10mcg/min to maintain MAP>65. CRRT continues, tolerating well. Incontinent of small amount dark maroon liquid stool with 1-2 small clots noted. Pericare provided. 0: Notified Myrna Griggs NP of bradycardia with decreasing Levophed, though MAP 80-90s. EKG ordered, advised to obtain am labs. 0400: REassessement unchanged except rectal temp 90.8. Noted blood warmer on CVVH machine set at 26.7C. Restarted blood warmer, antony hugger applied. Large watery maroon stool. Incont care provided. Zinc cream applied for moisture barrier. EKG confirms sinus ameya on monitor. [id number]: Propofol off for SAT/SBT. Patient awakens, nods appropriately, follows simple commands. Becomes anxious, decreased tidal volumes. Placed back on previous vent settings and Propofol restarted at 15mcg/kg/min. 0730: Bedside and Verbal shift change report given to WILDER Chavez RN and Didi Dunbar RN (oncoming nurse) by WILDER Perea RN (offgoing nurse).  Report included the following information SBAR, Kardex, Intake/Output, MAR, Recent Results and Cardiac Rhythm SB.

## 2021-12-08 NOTE — PROGRESS NOTES
GI PROGRESS NOTE  Isra Ya, LISA  [phone] NP in-hospital cell phone M-F until 4:30  After 5pm or on weekends, please call  for physician on call    NAME:Claude Lyna Langton :1975 PPW:[id number]   ATTG: Dr Angela Avila PCP: Sherryle Phi, MD Date/Time:  2021 0925 AM     Primary GI: Dr. Albert Chen - Dr Bal Degree covering     Assessment:   Acute onset of Hematemesis - 2/2 ulcers in distal esophagus but could be more further down that we were unable to visualize due to blood in stomach  Acute Blood Loss anemia - hgb dropped to 7.5 from 8.1  Melena - 6 episodes overnight  Chronic thrombocytopenia - plt dropped to 47 the other night, now at 76 and mostly stable  On Warfarin and ASA 81mg - last dose 21 PM  Hyperkalemia - resolved  - Sudden onset today at 0900 with about 100-200cc of BRB in emesis on 21 AM  - INR 1.2 today  - Neg Ct 21 for active bleed but large amount of blood in stomach  - OG tube in placed - 100cc of BRB overnight in canister  - Critical pt on pressors, ventilator, and sedated     CT abd/pel W/WO contrast 21 : Bleeding protocol  1. There is a large amount of hemorrhage within a distended stomach, extending  into the duodenum. An active bleed is not seen on this examination. However,  delayed images were not performed, somewhat limiting the study. 2. Bilateral pleural effusions with bilateral pneumonia. CT abd/pel W contrast 21 : 1. Relatively small right pleural effusion and minimal left pleural effusion, increased since 2021.  2. No acute findings in the abdomen or pelvis. .   3. Renal atrophy and calcified transplant kidneys. 4. Hepatomegaly. No focal lesion. 5. Small amount of ascites. Anasarca. EGD 21 : Esophagus:  Full of fresh blood and large clots. Eventually cleared and two ulcers (10 mm) and (5 mm) seen in distal esophagus at 42 cm. Very friable, but no active bleeding.  Injected Epi 1 : 10,000 total of 8.5 cc into area around both ulcers. Stomach: Full of huge clots, so only about 20 % of mucosa seen. Clots in stomach are dark and look old. Cannot find pylorus. Duodenum: not entered due to blood obscurring     Bacteremia - Gram + sepsis with dental abscess  COVID+ -  Admitted 12/27/21  ESRD on dialysis  Chronic pain  HTN     Plan:   · Repeat EGD today at 200 today with Dr Bryon Wright to re-evaluate for further causes of bleeding or possible re-treat esophageal ulcers  · Continue acid suppression  · Follow CBC - transfuse with blood, plts, ffp as necessary  · Hold all anticoagulation - previously on coumadin  · Maintain NPO  · Reglan q8hrs  · Rest per primary team.     Plan discussed with Dr Bryon Wright. Thank you for consultation. Subjective:   Discussed with RN events overnight. Resting in room. Sedated, on vent. On pressors. Complaint Y/N Description   Abdominal Pain     Hematemesis     Hematochezia     Melena     Constipation     Diarrhea     Dyspepsia     Dysphagia     Jaundiced     Nausea/vomiting       Review of Systems:  Symptom Y/N Comments  Symptom Y/N Comments   Fever/Chills    Chest Pain     Cough    Headaches     Sputum    Joint Pain     SOB/ZAMUDIO    Pruritis/Rash     Tolerating Diet    Other       Could NOT obtain due to: Sedated and intubated     Objective:   VITALS:   Last 24hrs VS reviewed since prior progress note. Most recent are:  Visit Vitals  /70 (BP 1 Location: Right upper arm, BP Patient Position: At rest)   Pulse 67   Temp (!) 91.1 °F (32.8 °C)   Resp 20   Ht 5' 7\" (1.702 m)   Wt 71.7 kg (158 lb 1.1 oz)   SpO2 98%   BMI 24.76 kg/m²       Intake/Output Summary (Last 24 hours) at 12/8/2021 0925  Last data filed at 12/8/2021 0900  Gross per 24 hour   Intake 1815.73 ml   Output 1307 ml   Net 508.73 ml     PHYSICAL EXAM:  General: WD, chronically ill appearing, sedated and intubated    HEENT: NC, Atraumatic. Anicteric sclerae. Lungs:  Intubated - ET tube in placed.   Heart:  Regular  rhythm  Abdomen: + distention- improving.  +Bowel sounds, no HSM. OG tube in place  Extremities: No c/c/e. Dialysis fistula. Neurologic:  Intubated and sedated  Psych:   Intubated and sedated. Lab and Radiology Data Reviewed: (see below)    Medications Reviewed: (see below)  PMH/SH reviewed - no change compared to H&P  ________________________________________________________________________  Total time spent with patient: 30 minutes ________________________________________________________________________  Care Plan discussed with:  Patient y   Family     RN lou Workman              Consultant:  karishma Vee NP     Procedures: see electronic medical records for all procedures/Xrays and details which were not copied into this note but were reviewed prior to creation of Plan. LABS:  Recent Labs     12/08/21 0418 12/07/21  2230   WBC 24.2* 25.1*   HGB 7.5* 8.2*   HCT 22.2* 24.4*   PLT 76* 84*     Recent Labs     12/08/21 0418 12/07/21 0818 12/07/21  0206    137 140   K 4.9 5.2* 4.9    99 102   CO2 25 19* 21   BUN 68* 73* 71*   CREA 4.98* 6.24* 6.18*   * 161* 96   CA 8.4* 7.2* 7.2*     Recent Labs     12/08/21 0418 12/06/21  0939     --    TP 5.2*  --    ALB 2.1*  --    GLOB 3.1  --    LPSE  --  1,177*     Recent Labs     12/08/21 0418 12/07/21 0206 12/06/21  0939   INR 1.2* 1.6* 1.4*   PTP 12.3* 15.9* 14.8*      Recent Labs     12/06/21  0939   FERR 1,339*      Lab Results   Component Value Date/Time    Folate 7.5 11/23/2011 11:26 AM     Recent Labs     12/06/21  1639   PH 7.41   PCO2 35   PO2 436*     No results for input(s): CPK, CKMB in the last 72 hours.     No lab exists for component: TROPONINI  Lab Results   Component Value Date/Time    Color RED 11/16/2012 05:15 PM    Appearance OPAQUE 11/16/2012 05:15 PM    Specific gravity 1.020 11/16/2012 05:15 PM    Specific gravity 1.011 09/17/2012 01:35 AM    pH (UA) 8.0 11/16/2012 05:15 PM    Protein >300 (A) 11/16/2012 05:15 PM Glucose 100 (A) 11/16/2012 05:15 PM    Ketone NEGATIVE  11/16/2012 05:15 PM    Bilirubin NEGATIVE  09/17/2012 01:35 AM    Urobilinogen 0.2 11/16/2012 05:15 PM    Nitrites NEGATIVE  11/16/2012 05:15 PM    Leukocyte Esterase NEGATIVE  11/16/2012 05:15 PM    Epithelial cells 5-10 11/16/2012 05:15 PM    Bacteria 3+ (A) 11/16/2012 05:15 PM    WBC >100 (H) 11/16/2012 05:15 PM    RBC >100 (H) 11/16/2012 05:15 PM       MEDICATIONS:  Current Facility-Administered Medications   Medication Dose Route Frequency    NOREPINephrine (LEVOPHED) 8 mg in 5% dextrose 250mL (32 mcg/mL) infusion  0-10 mcg/min IntraVENous TITRATE    fentaNYL citrate (PF) injection 25 mcg  25 mcg IntraVENous Q2H PRN    calcitRIOL (ROCALTROL) capsule 0.5 mcg  0.5 mcg Per G Tube DAILY    calcium acetate(phosphat bind) (PHOSLO) capsule 1,334 mg  2 Capsule Per NG tube TID WITH MEALS    metoclopramide HCl (REGLAN) injection 5 mg  5 mg IntraVENous Q8H    bicarbonate dialysis (PRISMASOL) BG K 2/Ca 3.5 5000 ml solution   Extracorporeal DIALYSIS CONTINUOUS    albuterol-ipratropium (DUO-NEB) 2.5 MG-0.5 MG/3 ML  3 mL Nebulization Q4H PRN    piperacillin-tazobactam (ZOSYN) 3.375 g in 0.9% sodium chloride (MBP/ADV) 100 mL MBP  3.375 g IntraVENous Q8H    pantoprazole (PROTONIX) 40 mg in 0.9% sodium chloride 10 mL injection  40 mg IntraVENous Q12H    Warfarin- Hold until bleeding has resolved   Other Rx Dosing/Monitoring    albumin human 25% (BUMINATE) solution 25 g  25 g IntraVENous DIALYSIS PRN    propofol (DIPRIVAN) 10 mg/mL infusion  0-50 mcg/kg/min IntraVENous TITRATE    fentaNYL (PF) 1,500 mcg/30 mL (50 mcg/mL) infusion  0-200 mcg/hr IntraVENous TITRATE    alcohol 62% (NOZIN) nasal  1 Ampule  1 Ampule Topical Q12H    chlorhexidine (PERIDEX) 0.12 % mouthwash 15 mL  15 mL Oral Q12H    epoetin emilie-epbx (RETACRIT) 12,000 Units combo injection  12,000 Units SubCUTAneous Q MON, WED & FRI    sodium chloride (NS) flush 5-10 mL  5-10 mL IntraVENous PRN    acetaminophen (TYLENOL) tablet 650 mg  650 mg Oral Q6H PRN    [Held by provider] L.acidophilus-paracasei-S.thermophil-bifidobacter (RISAQUAD) 8 billion cell capsule  1 Capsule Oral DAILY    sodium chloride (NS) flush 5-40 mL  5-40 mL IntraVENous Q8H    sodium chloride (NS) flush 5-40 mL  5-40 mL IntraVENous PRN    polyethylene glycol (MIRALAX) packet 17 g  17 g Oral DAILY PRN    ondansetron (ZOFRAN) injection 4 mg  4 mg IntraVENous Q6H PRN

## 2021-12-08 NOTE — PROCEDURES
[facility]                   Endoscopic Gastroduodenoscopy Procedure Note      12/8/2021  Augustina Enriquez  1975  [id number]    Procedure: Endoscopic Gastroduodenoscopy     Indication: Continue bloody NG output     Pre-operative Diagnosis: see indication above    Post-operative Diagnosis: see findings below    : TOMÁS Sheffield MD    Referring Provider:  Robbin Ortiz MD      Anesthesia/Sedation:  Fentanyl 100 mcg IV and already on propofol drip    Airway assessment: No airway problems anticipated    Pre-Procedural Exam:      Airway: clear, no airway problems anticipated  Heart: RRR, without gallops or rubs  Lungs: clear bilaterally without wheezes, crackles, or rhonchi  Abdomen: soft, nontender, nondistended, bowel sounds present  Mental Status: awake, alert and oriented to person, place and time       Procedure Details     After infomed consent was obtained for the procedure, with all risks and benefits of procedure explained the patient was taken to the endoscopy suite and placed in the left lateral decubitus position. Following sequential administration of sedation as per above, the endoscope was inserted into the mouth and advanced under direct vision to second portion of the duodenum. A careful inspection was made as the gastroscope was withdrawn, including a retroflexed view of the proximal stomach; findings and interventions are described below. Findings:   Esophagus:  Upper and mid esophagus appears normal. Distal esophagus looks diffusely ulcerated with large amount of old clot. No active bleeding seen. Stomach: FULL of old blood, large clots. Cannot evaluate gastric mucosa. Pylorus found and patent. Scope was able to be advanced into duodenum  Duodenum: normal bulb and second portion. No ulcers seen    Therapies:  none    Specimens: none           Complications:   None; patient tolerated the procedure well. EBL:  None. Impression:      1. Severe distal esophageal ulceration which is likely cause of bleeding  2. Stomach full of blood---mucosa not well seen  3. Duodenum is normal    Recommendations:  -Continue acid suppression. , -Support with blood products.     Ezio Lewis., [id number]

## 2021-12-08 NOTE — CONSULTS
Surgery Consult  Consulted by: Dr. Heidi Rouse. Thank you. PCP: Robbin Ortiz MD    History and data reviewed. Pt examined. I&S. Softly distended. No involuntary guarding    Impression:  CT reviewed. No free air. Stomach full of clotted blood. Air from endoscopies causing dilatation of bowel. Plan:  No surgical indications presently. NG may help with decompression of air, but presently it is in the clot on CT. Need to flush it per protocol. PRBC's, platelets and FFP as needed. FULL NOTE ADDENDUM WILL BE ADDED BELOW. Thanks. Signed By: Maria Ines Hand MD     December 8, 2021      ------------------------------------------------------------------------    ADDENDUM:     Patient interviewed and examined. Subjective:      Augustina Enriquez is a 42 y.o. male who is in critical condition with COVID pneumonia. He has also had upper gastrointestinal bleeding. This has been investigated with upper endoscopy twice, including today. There are ulcers in the distal esophagus that likely account for the bleeding. I am asked to consult given the patient's acute onset of abdominal distention. Objective:   See flowsheet for vitals. Physical Exam:  PHYSICAL EXAM:  Gen:  I&S    HEENT:       RESP:   coarse    CARD:  [x]     regular rate and rhythm     [x]     No murmurs/rubs/gallops    []     irregular rhythm     []     Murmur     []     Rubs     []     Gallops    ABD:       Softly distended. No pain response to palpation.       SKIN:       EXT:      NEUR:   []     Strength normal     []weakness  []LUE    []RUE    []LLE    []RLE    []     follows commands          PSYCH:   insight [x]poor   []good                      []     depressed     []     anxious     []     agitated    Past Medical History:   Diagnosis Date    Abdominal hematoma     AICD (automatic cardioverter/defibrillator) present     CAD (coronary artery disease)     anterior MI s/p 2 stents  2007    Chronic abdominal pain  Chronic kidney disease     ESRD; Dialysis dependent.  Home [facility] labs- [facility] tpke    DVT (deep venous thrombosis) ([address] 2001    DVT of popliteal vein ([address] 11/2011    not anticoagulated due to bleeding, IVC filter    Endocarditis     Gallstone pancreatitis     Gastrointestinal disorder     acid reflux    Gastrointestinal disorder     peptic ulcer    Hemodialysis patient ([address]     High cholesterol     Hypertension     Kidney transplant     b/l kidneys 1995, 2001    Long term current use of anticoagulant therapy     Nephrotic syndrome     Other ill-defined conditions(799.89)     kidny transplant x2,  on dialysis    Other ill-defined conditions(799.89)     home dialysis    Other ill-defined conditions(799.89)     hx recurrent left leg DVT    Peritonitis ([address]     Seizures ([address] 2015    most recent- only had three in lifetime    Small bowel obstruction (HCC)     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[facility]     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    V-tach ([facility]      Past Surgical History:   Procedure Laterality Date    HX APPENDECTOMY      HX CHOLECYSTECTOMY      HX OTHER SURGICAL  11/2011    exploratory laparotomy    HX OTHER SURGICAL      fem-pop    HX OTHER SURGICAL  02/2013    right upper extremity fistula    HX PACEMAKER Left 6/2009    AICD-st latonya-not connected    HX PACEMAKER Left     AICD-left side-BS-working    HX SMALL BOWEL RESECTION      HX TRANSPLANT  2001     Kidney    HX TRANSPLANT  1992    kidney    HX VASCULAR ACCESS Right     femoral access for HD- does 5 day dialysis @ home    IR INSERT NON TUNL CVC OVER 5 YRS  12/7/2021    IR INSERT NON TUNL CVC OVER 5 YRS  12/10/2021    IR REMOVE TUNL CVAD W/O PORT / PUMP  10/29/2020    IR REPLACE CVC TUNNELED W/O PORT  9/10/2020    ND CARDIAC SURG PROCEDURE UNLIST  2007    2 stents    ND EDG US EXAM SURGICAL ALTER STOM DUODENUM/JEJUNUM  7/15/2011         ND ESOPHAGOGASTRODUODENOSCOPY TRANSORAL DIAGNOSTIC  5/24/2012         UPPER GI ENDOSCOPY,CTRL BLEED  12/6/2021         UPPER GI ENDOSCOPY,DIAGNOSIS  12/8/2021         VASCULAR SURGERY PROCEDURE UNLIST  11/14/2017    Insertion AV graft right upper arm (bovine); ligation of AV fistula right arm      Family History   Problem Relation Age of Onset    COPD Mother     Lung Disease Mother     Cancer Father         stomach    Cancer Maternal Grandmother      Social History     Socioeconomic History    Marital status: SINGLE   Tobacco Use    Smoking status: Never Smoker    Smokeless tobacco: Never Used   Substance and Sexual Activity    Alcohol use: No    Drug use: Yes     Types: Prescription, OTC      Prior to Admission medications    Medication Sig Start Date End Date Taking? Authorizing Provider   ondansetron (Zofran ODT) 4 mg disintegrating tablet 1 Tab by SubLINGual route every eight (8) hours as needed for Nausea or Vomiting. 3/27/21   So Dunbar MD   metoprolol succinate (TOPROL-XL) 25 mg XL tablet Take 0.5 Tabs by mouth daily. 11/10/20   Suzanne Clemens MD   calcium acetate,phosphat bind, (PHOSLO) 667 mg cap Take 2 Caps by mouth three (3) times daily (with meals). Indications: low amount of calcium in the blood, renal osteodystrophy with hyperphosphatemia 11/10/20   Suzanne Clemens MD   atorvastatin (Lipitor) 20 mg tablet Take 20 mg by mouth daily. Provider, Historical   warfarin (COUMADIN) 2.5 mg tablet Take 2.5 mg by mouth daily. Provider, Historical   acetaminophen (TYLENOL) 500 mg tablet Take 1 Tab by mouth every four (4) hours as needed for Pain. Over the counter 1/20/19   Kali Mcgraw MD   omeprazole (PRILOSEC) 20 mg capsule Take 20 mg by mouth daily. Provider, Historical   calcitRIOL (ROCALTROL) 0.5 mcg capsule Take 0.5 mcg by mouth daily. Provider, Historical   aspirin 81 mg chewable tablet Take 81 mg by mouth daily.     Other, MD Morro     ALLERGIES:    Allergies   Allergen Reactions    Shellfish Containing Products Swelling     Break out in rash, trouble breathing    Contrast Agent [Iodine] Shortness of Breath    Heparin Analogues Other (comments)     Positive history of HIT       Review of Systems:  (unchecked were asked but negative)          []      Fever/chills     []      Abd Pain   []      Fatique                                   []      Nausea/Vomit   []      Weight loss    []      Diarrhea []      Constipation    []      Headache    []      Blood in stool  []      Visual loss    []      Hematuria   []      Hearing loss    []      Dysuria      []      Heat intolerance    []      Myalgias  []      Cold intolerance    []      Arthralgias  []      Reflux     []      Neuropathy   []      Dysphagia    []      Easy bruising  []      Chest Pain    []      Prolonged bleeding   []      Palpitations    []      Anxiety   []      Cough                                                    []      Depression                                []      Sputum           []      SOB/ZAMUDIO                                   [x]     Unable to obtain  ROS due to  [x]     mental status change  []     sedated   []     intubated    LABS:  Recent Labs     12/10/21  1806 12/10/21  0915 12/10/21  0346 12/10/21  0346 12/09/21  0410 12/09/21  0410   WBC  --   --   --  22.9*  --  29.2*   HGB 7.2* 7.3*   < > 5.9*   < > 7.4*   HCT  --  22.3*  --  18.1*   < > 21.6*   PLT  --   --   --  54*  --  59*  62*    < > = values in this interval not displayed.      Recent Labs     12/10/21  1540 12/10/21  0346 12/09/21  1558    138 138   K 4.0 4.0 4.1    105 104   CO2 25 24 27   BUN 54* 50* 51*   CREA 4.27* 3.70* 3.78*   * 108* 80   CA 7.3* 7.4* 7.8*   MG 2.1 2.2 1.9   PHOS 3.3 3.2 3.3     Recent Labs     12/10/21  0346 12/09/21  0410 12/08/21  0418   * 121* 102   TP 4.4* 5.2* 5.2*   ALB 1.8* 2.1* 2.1*   GLOB 2.6 3.1 3.1     Recent Labs     12/09/21 0410 12/08/21 0418   INR 1.0  1.1 1.1  1.2*   PTP 11.2  11.9* 11.4  12.3*   APTT 31 36*         Signed By: Champ Ridley MD December 10, 2021

## 2021-12-08 NOTE — PROGRESS NOTES
0730- Bedside shift change report given to Ji (oncoming nurse) by Marquis Mcfarlane (offgoing nurse). Report included the following information SBAR, Kardex, Intake/Output, MAR and Recent Results. 1868- Propofol increased to 25 mcg/hr    0750- Pt agitated, moving arms. He communicates he's in pain at an 8, holding up his fingers. Fentanyl drip increased to 100mcg/hr. 0900- propfol increased to 25 and then 30mcg/min to achieve goal RASS of -2.     1030- Interdisciplinary team rounds were held 12/8/2021 with the following team members:Care Management, Diabetes Treatment Specialist, Nursing, Nutrition, Pharmacy, Physical Therapy, Physician and Respiratory Therapy. Plan of care discussed. See clinical pathway and/or care plan for interventions and desired outcomes. Goals of the Day:    65- Dr. Dariel Bettencourt at bedside. Updated on condition. Verbal order to increase Patient Fluid Removal to 25-75 mL/hr and to titrate Levo PRN to maintain SBP greater than 90.      1204- Levo increased to 3 mcg/min to maintain SBP greater than 90.     1320- GI at the bedside to perform EGD.     1400- EDG completed. Assessed abdomen as rigid. Dr. Lester Coombs notified and advised to reinsert the OG tube to decompress the stomach. Difficulty placing OG tube. 80- Dr. Chasity Ramirez at the bedside, placed OG tube. Unable to verify placement with air bolus. Verbal order noted to obtain KUB. 1440- Pt BP dropping to 60/40. Levo titrated up to 15mcg/min and propofol changed to 20 mcg/min. Dr. Chasity Ramirez updated on pt condition and awaiting final KUB results. 200- Dr. Chasity Ramirez at bedside to read portable KUB/chest x-ray. No new orders. 1545- Hgb 6.5 and platelets 67. Dr. Chasity Ramirez ordered PRBC.      1600- OG tube advanced per Dr. Chasity Ramirez. Air bolus heard with advancement and tube is at 62cm at the lips. 1615-. Pt rinsed back and disconnected from CRRT so he can go down for a CT.    1700- Abdominal CT completed.  Dustin Taylor notified that pt CVVH needs to be restarted. 1820- Started transfusion of 1 unit of PRBC. 1845- Pt O2 saturation dropping. Pulse ox repositioned several times. Respiratory notified. 100% O2 boost being continuously utilized until they arrive. 1915- Bedside shift change report given to Alvina (oncoming nurse) by Moon Osorio (offgoing nurse). Report included the following information SBAR, Kardex, Intake/Output, MAR, Accordion and Recent Results.

## 2021-12-08 NOTE — DIALYSIS
CRRT / [id number]         Orders   Mode: CVVH started @ 1900   Blood Flow Rate: 200 ml/min   Prismasol Dose: 65kg x 20ml/kg/hr = 1300 ml/hr(PBP: 650 ml/hr, Replacement: 650 ml/hr)   Prismasol Concentrate: 2K/3.5Ca   Blood Warmer Temp: 37*C   Net Fluid Removal: 0 ml/hr          Metrics   BP: 129/72   HR: 79   Access Pressure: -87   Filter Pressure: 158   Return Pressure: 128   TMP: 49   Pressure Drop: 10          Access   Type & Location: L Femoral temporary CVC   Comments: Dressing CDI dated 12/7. +aspiration/+flush x2 ports with some resistance on both ports.                                           Labs   HBsAg (Antigen) / date: Negative  12/1/21   HBsAb (Antibody) / date: Susceptible 12/1/21   Source: [facility]          Safety:   Time Out Done:   (Time) 8592   Consent obtained/signed: Obtained   Education: Pt intubated/sedated   Primary Nurse Rpt Pre: RAINER Cedeno RN   Primary Nurse Rpt Post: RAINER Cedeno RN      Comments / Plan:       CRRT initiated per MD order. Patient, code status, labs, and orders verified. +COVID-19 pt: All proper PPE worn by dialysis RN. Line placement confirmed by chest xray. Filter set-up, primed, tested and running well. L Femoral temporary non-tunneled CVC, dressing with bio-patch. No signs of redness or infection visualized. Each catheter limb disinfected for 60 seconds per limb with alcohol swabs. Caps removed, dialysis CVC hub scrubbed with Pre[employer]ics for 15 seconds, followed by a 5 second dry time per Hospital P&P. +aspiration/+flush x 2 ports with some resistance. LINES REVERSED. Lines visible and connections secure with blood warmer to return line at 37*C. Education & pre/post report given to RN.

## 2021-12-08 NOTE — PROGRESS NOTES
Nephrology Progress Note  Blaise Leblanc     [email or web address]  Phone - [phone]   Patient: Mari Blevins    YOB: 1975        Date- 12/8/2021   Admit Date: 11/27/2021  CC: Follow up for  esrd        IMPRESSION & PLAN:   · ESRD-- home HD 5 days per week- Follows up with Dr Dev Delvalle at [facility] ( now [facility] for IV antibiotics.)  · Covid 19 infection  · HYPOVOLEMIC SHOCK  · UPPER GI BLEED  · hyperkalemia  · Left arm swelling- s/p angioplasty of left subclavian vein  · Gram positive sepsis from dental abcess  · s/p lead extraction at [facility]  · Anemia of ckd  · Hx of renal tx in past times 2.  · Hypertension  · CAD  · Failed RTX times 2         H/o DVT, s/p ivc filter/ h/o HIT      PLAN-   CONTINUE CRRT   Add factor as tolerated   Check labs bid   Prn PRBC tx    EPO for anemia   D/w ICU nurse     Subjective: Interval History:   He is on vent  Started on CRRT yesterday  He couldn't tolerate hd   k improved      Objective:   Vitals:    12/08/21 0911 12/08/21 0930 12/08/21 1000 12/08/21 1026   BP:  107/65 93/61 91/64   Pulse:  65 66 64   Resp:  18 18 19   Temp: (!) 92.2 °F (33.4 °C)      TempSrc:       SpO2:  99% 100% 100%   Weight:       Height:          12/07 0701 - 12/08 0700  In: 3060.3 [P.O.:350; I.V.:1834.4]  Out: 1254   Last 3 Recorded Weights in this Encounter    12/05/21 0634 12/06/21 0619 12/07/21 2235   Weight: 64.7 kg (142 lb 10.2 oz) 65 kg (143 lb 4.8 oz) 71.7 kg (158 lb 1.1 oz)      Physical exam:   GEN: on vent  NECK- ET tube +  RESP: no distress  NEURO:Can't access due to patient's current condition         Chart reviewed. Pertinent Notes reviewed.      Data Review :  Recent Labs     12/08/21  0418 12/07/21  0818 12/07/21  0206    137 140   K 4.9 5.2* 4.9    99 102   CO2 25 19* 21   BUN 68* 73* 71*   CREA 4.98* 6.24* 6.18*   * 161* 96   CA 8.4* 7.2* 7.2*     Recent Labs     12/08/21  0418 12/07/21  2230 12/07/21  1432 WBC 24.2* 25.1* 21.2*   HGB 7.5* 8.2* 7.1*   HCT 22.2* 24.4* 21.1*   PLT 76* 84* 78*     Recent Labs     12/06/21  0939   FERR 1,339*      Medication list  reviewed  Current Facility-Administered Medications   Medication Dose Route Frequency    NOREPINephrine (LEVOPHED) 8 mg in 5% dextrose 250mL (32 mcg/mL) infusion  0-10 mcg/min IntraVENous TITRATE    fentaNYL citrate (PF) injection 25 mcg  25 mcg IntraVENous Q2H PRN    calcitRIOL (ROCALTROL) capsule 0.5 mcg  0.5 mcg Per G Tube DAILY    [Held by provider] calcium acetate(phosphat bind) (PHOSLO) capsule 1,334 mg  2 Capsule Per NG tube TID WITH MEALS    metoclopramide HCl (REGLAN) injection 5 mg  5 mg IntraVENous Q8H    bicarbonate dialysis (PRISMASOL) BG K 2/Ca 3.5 5000 ml solution   Extracorporeal DIALYSIS CONTINUOUS    albuterol-ipratropium (DUO-NEB) 2.5 MG-0.5 MG/3 ML  3 mL Nebulization Q4H PRN    piperacillin-tazobactam (ZOSYN) 3.375 g in 0.9% sodium chloride (MBP/ADV) 100 mL MBP  3.375 g IntraVENous Q8H    pantoprazole (PROTONIX) 40 mg in 0.9% sodium chloride 10 mL injection  40 mg IntraVENous Q12H    Warfarin- Hold until bleeding has resolved   Other Rx Dosing/Monitoring    albumin human 25% (BUMINATE) solution 25 g  25 g IntraVENous DIALYSIS PRN    propofol (DIPRIVAN) 10 mg/mL infusion  0-50 mcg/kg/min IntraVENous TITRATE    fentaNYL (PF) 1,500 mcg/30 mL (50 mcg/mL) infusion  0-200 mcg/hr IntraVENous TITRATE    alcohol 62% (NOZIN) nasal  1 Ampule  1 Ampule Topical Q12H    chlorhexidine (PERIDEX) 0.12 % mouthwash 15 mL  15 mL Oral Q12H    epoetin emilie-epbx (RETACRIT) 12,000 Units combo injection  12,000 Units SubCUTAneous Q MON, WED & FRI    sodium chloride (NS) flush 5-10 mL  5-10 mL IntraVENous PRN    acetaminophen (TYLENOL) tablet 650 mg  650 mg Oral Q6H PRN    [Held by provider] L.acidophilus-paracasei-S.thermophil-bifidobacter (RISAQUAD) 8 billion cell capsule  1 Capsule Oral DAILY    sodium chloride (NS) flush 5-40 mL  5-40 mL IntraVENous Q8H    sodium chloride (NS) flush 5-40 mL  5-40 mL IntraVENous PRN    polyethylene glycol (MIRALAX) packet 17 g  17 g Oral DAILY PRN    ondansetron (ZOFRAN) injection 4 mg  4 mg IntraVENous Q6H PRN          Shannan Wells MD              [facility]  [facility] / [facility], [address], [address]  Phone - [phone]               Fax - [phone]

## 2021-12-08 NOTE — DIALYSIS
CRRT / [id number]    Orders   Mode: CVVH    Blood Flow Rate: 200 ml/min   Prismasol Dose: 20 ml/kg/hr x 65 kg = 1300 ml/hr (PBP: 650 ml/hr, Replacement: 650 ml/hr)   Prismasol Concentrate: 2K/3.5Ca   Blood Warmer Temp: 37*C   Net Fluid Removal: 0 ml/hr     Metrics   BP: 107/63   HR: 67   Access Pressure: -95   Filter Pressure: 218   Return Pressure: 131   TMP: 67   Pressure Drop: 57     Access   Type & Location: L femoral CVC   Comments: Dressing CDI. Lines reversed. Labs   HBsAg (Antigen) / date: Negative 12/1/21   HBsAb (Antibody) / date: Immune 11/11/21   Source: [facility]     Safety:   Time Out Done:   (Time) 0600   Consent obtained/signed: Verified   Education: Pt intubated/sedated   Primary Nurse Rpt Pre: Hakan Hernandez RN   Primary Nurse Rpt Post: Hakan Hernandez RN     Comments / Plan:      [id number] filter running well with no indication for change at this time. Consents, patient, code status, labs and orders verified. +COVID-19 pt: All proper PPE worn by dialysis RN. L femoral CVC, dressing CDI with bio-patch. No signs of redness, drainage, or infection visualized. Lines REVERSED, visible and connections secure with blood warmer to return line at 37*C. Education & pre/post report given to RN.

## 2021-12-08 NOTE — INTERDISCIPLINARY ROUNDS
Interdisciplinary team rounds were held 12/8/2021  with the following team members:Care Management, Nursing, Nutrition, Pharmacy, Physical Therapy, Physician and Respiratory Therapy. Plan of care discussed. Goal: Repeat EGD, monitor labs, adjust medication, continue to monitor and support. See MD orders and progress notes for further  interventions and desired outcomes.

## 2021-12-09 ENCOUNTER — APPOINTMENT (OUTPATIENT)
Dept: GENERAL RADIOLOGY | Age: 46
DRG: 207 | End: 2021-12-09
Attending: INTERNAL MEDICINE
Payer: MEDICARE

## 2021-12-09 LAB
ABO + RH BLD: NORMAL
ALBUMIN SERPL-MCNC: 2.1 G/DL (ref 3.5–5)
ALBUMIN/GLOB SERPL: 0.7 {RATIO} (ref 1.1–2.2)
ALP SERPL-CCNC: 121 U/L (ref 45–117)
ALT SERPL-CCNC: 256 U/L (ref 12–78)
ANION GAP SERPL CALC-SCNC: 7 MMOL/L (ref 5–15)
ANION GAP SERPL CALC-SCNC: 8 MMOL/L (ref 5–15)
APTT PPP: 36 SEC (ref 24–33)
AST SERPL-CCNC: 396 U/L (ref 15–37)
BACTERIA SPEC CULT: ABNORMAL
BACTERIA SPEC CULT: ABNORMAL
BASOPHILS # BLD: 0 K/UL (ref 0–0.1)
BASOPHILS NFR BLD: 0 % (ref 0–1)
BILIRUB DIRECT SERPL-MCNC: 0.6 MG/DL (ref 0–0.2)
BILIRUB SERPL-MCNC: 1.6 MG/DL (ref 0.2–1)
BLD PROD TYP BPU: NORMAL
BLOOD BANK CMNT PATIENT-IMP: NORMAL
BLOOD GROUP ANTIBODIES SERPL: NORMAL
BPU ID: NORMAL
BUN SERPL-MCNC: 51 MG/DL (ref 6–20)
BUN SERPL-MCNC: 55 MG/DL (ref 6–20)
BUN/CREAT SERPL: 13 (ref 12–20)
BUN/CREAT SERPL: 14 (ref 12–20)
CALCIUM SERPL-MCNC: 7.8 MG/DL (ref 8.5–10.1)
CALCIUM SERPL-MCNC: 7.9 MG/DL (ref 8.5–10.1)
CHLORIDE SERPL-SCNC: 103 MMOL/L (ref 97–108)
CHLORIDE SERPL-SCNC: 104 MMOL/L (ref 97–108)
CO2 SERPL-SCNC: 26 MMOL/L (ref 21–32)
CO2 SERPL-SCNC: 27 MMOL/L (ref 21–32)
CREAT SERPL-MCNC: 3.78 MG/DL (ref 0.7–1.3)
CREAT SERPL-MCNC: 3.81 MG/DL (ref 0.7–1.3)
CROSSMATCH RESULT,%XM: NORMAL
D-DIMER, 115188: 5.75 MG/L FEU (ref 0–0.49)
DIFFERENTIAL METHOD BLD: ABNORMAL
EOSINOPHIL # BLD: 0 K/UL (ref 0–0.4)
EOSINOPHIL NFR BLD: 0 % (ref 0–7)
ERYTHROCYTE [DISTWIDTH] IN BLOOD BY AUTOMATED COUNT: 17.9 % (ref 11.5–14.5)
FIBRINOGEN ACTIVITY, 001610: 357 MG/DL (ref 193–507)
FSP PPP LA-ACNC: 5 UG/ML
GLOBULIN SER CALC-MCNC: 3.1 G/DL (ref 2–4)
GLUCOSE BLD STRIP.AUTO-MCNC: 106 MG/DL (ref 65–117)
GLUCOSE BLD STRIP.AUTO-MCNC: 55 MG/DL (ref 65–117)
GLUCOSE BLD STRIP.AUTO-MCNC: 58 MG/DL (ref 65–117)
GLUCOSE BLD STRIP.AUTO-MCNC: 71 MG/DL (ref 65–117)
GLUCOSE BLD STRIP.AUTO-MCNC: 72 MG/DL (ref 65–117)
GLUCOSE BLD STRIP.AUTO-MCNC: 79 MG/DL (ref 65–117)
GLUCOSE SERPL-MCNC: 80 MG/DL (ref 65–100)
GLUCOSE SERPL-MCNC: 94 MG/DL (ref 65–100)
GRAM STN SPEC: ABNORMAL
HCT VFR BLD AUTO: 21.6 % (ref 36.6–50.3)
HGB BLD-MCNC: 7.4 G/DL (ref 12.1–17)
IMM GRANULOCYTES # BLD AUTO: 0.3 K/UL (ref 0–0.04)
IMM GRANULOCYTES NFR BLD AUTO: 1 % (ref 0–0.5)
INR PPP: 1.1 (ref 0.9–1.1)
INR PPP: 1.1 (ref 0.9–1.2)
LYMPHOCYTES # BLD: 0.3 K/UL (ref 0.8–3.5)
LYMPHOCYTES NFR BLD: 1 % (ref 12–49)
MAGNESIUM SERPL-MCNC: 1.9 MG/DL (ref 1.6–2.4)
MAGNESIUM SERPL-MCNC: 2.2 MG/DL (ref 1.6–2.4)
MCH RBC QN AUTO: 29.5 PG (ref 26–34)
MCHC RBC AUTO-ENTMCNC: 34.3 G/DL (ref 30–36.5)
MCV RBC AUTO: 86.1 FL (ref 80–99)
MONOCYTES # BLD: 1.8 K/UL (ref 0–1)
MONOCYTES NFR BLD: 6 % (ref 5–13)
MORPHOLOGY BLD-IMP: NORMAL
NEUTS SEG # BLD: 26.8 K/UL (ref 1.8–8)
NEUTS SEG NFR BLD: 92 % (ref 32–75)
NRBC # BLD: 0.66 K/UL (ref 0–0.01)
NRBC BLD-RTO: 2.3 PER 100 WBC
PHOSPHATE SERPL-MCNC: 3.3 MG/DL (ref 2.6–4.7)
PHOSPHATE SERPL-MCNC: 4.2 MG/DL (ref 2.6–4.7)
PLATELET # BLD AUTO: 62 K/UL (ref 150–400)
PLATELET # BLD AUTO: 72 X10E3/UL (ref 150–450)
PLATELET COMMENTS,PCOM: ABNORMAL
PMV BLD AUTO: 12.9 FL (ref 8.9–12.9)
POTASSIUM SERPL-SCNC: 4.1 MMOL/L (ref 3.5–5.1)
POTASSIUM SERPL-SCNC: 4.5 MMOL/L (ref 3.5–5.1)
PROCALCITONIN SERPL-MCNC: 46.86 NG/ML
PROT SERPL-MCNC: 5.2 G/DL (ref 6.4–8.2)
PROTHROMBIN TIME: 11.4 SEC (ref 9.1–12)
PROTHROMBIN TIME: 11.9 SEC (ref 9–11.1)
RBC # BLD AUTO: 2.51 M/UL (ref 4.1–5.7)
RBC MORPH BLD: ABNORMAL
SERVICE CMNT-IMP: ABNORMAL
SERVICE CMNT-IMP: NORMAL
SODIUM SERPL-SCNC: 137 MMOL/L (ref 136–145)
SODIUM SERPL-SCNC: 138 MMOL/L (ref 136–145)
SPECIMEN EXP DATE BLD: NORMAL
STATUS OF UNIT,%ST: NORMAL
TRIGL SERPL-MCNC: 298 MG/DL (ref ?–150)
UNIT DIVISION, %UDIV: 0
WBC # BLD AUTO: 29.2 K/UL (ref 4.1–11.1)

## 2021-12-09 PROCEDURE — 77010033678 HC OXYGEN DAILY

## 2021-12-09 PROCEDURE — 85610 PROTHROMBIN TIME: CPT

## 2021-12-09 PROCEDURE — 74011250636 HC RX REV CODE- 250/636: Performed by: INTERNAL MEDICINE

## 2021-12-09 PROCEDURE — C9113 INJ PANTOPRAZOLE SODIUM, VIA: HCPCS | Performed by: INTERNAL MEDICINE

## 2021-12-09 PROCEDURE — 74011000258 HC RX REV CODE- 258: Performed by: INTERNAL MEDICINE

## 2021-12-09 PROCEDURE — 84100 ASSAY OF PHOSPHORUS: CPT

## 2021-12-09 PROCEDURE — 74011000250 HC RX REV CODE- 250: Performed by: INTERNAL MEDICINE

## 2021-12-09 PROCEDURE — 83735 ASSAY OF MAGNESIUM: CPT

## 2021-12-09 PROCEDURE — 82962 GLUCOSE BLOOD TEST: CPT

## 2021-12-09 PROCEDURE — 80076 HEPATIC FUNCTION PANEL: CPT

## 2021-12-09 PROCEDURE — 2709999900 HC NON-CHARGEABLE SUPPLY

## 2021-12-09 PROCEDURE — 74011250637 HC RX REV CODE- 250/637: Performed by: INTERNAL MEDICINE

## 2021-12-09 PROCEDURE — 85025 COMPLETE CBC W/AUTO DIFF WBC: CPT

## 2021-12-09 PROCEDURE — 65610000006 HC RM INTENSIVE CARE

## 2021-12-09 PROCEDURE — 84145 PROCALCITONIN (PCT): CPT

## 2021-12-09 PROCEDURE — 90945 DIALYSIS ONE EVALUATION: CPT

## 2021-12-09 PROCEDURE — 94003 VENT MGMT INPAT SUBQ DAY: CPT

## 2021-12-09 PROCEDURE — 99232 SBSQ HOSP IP/OBS MODERATE 35: CPT | Performed by: SURGERY

## 2021-12-09 PROCEDURE — 84478 ASSAY OF TRIGLYCERIDES: CPT

## 2021-12-09 PROCEDURE — 36415 COLL VENOUS BLD VENIPUNCTURE: CPT

## 2021-12-09 PROCEDURE — 80048 BASIC METABOLIC PNL TOTAL CA: CPT

## 2021-12-09 PROCEDURE — 71045 X-RAY EXAM CHEST 1 VIEW: CPT

## 2021-12-09 RX ORDER — DEXTROSE 50 % IN WATER (D50W) INTRAVENOUS SYRINGE
12.5-25 AS NEEDED
Status: DISCONTINUED | OUTPATIENT
Start: 2021-12-09 | End: 2021-12-09

## 2021-12-09 RX ORDER — INSULIN LISPRO 100 [IU]/ML
INJECTION, SOLUTION INTRAVENOUS; SUBCUTANEOUS EVERY 6 HOURS
Status: DISCONTINUED | OUTPATIENT
Start: 2021-12-09 | End: 2021-12-09

## 2021-12-09 RX ORDER — MAGNESIUM SULFATE 100 %
4 CRYSTALS MISCELLANEOUS AS NEEDED
Status: DISCONTINUED | OUTPATIENT
Start: 2021-12-09 | End: 2021-12-09

## 2021-12-09 RX ORDER — DEXTROSE 50 % IN WATER (D50W) INTRAVENOUS SYRINGE
12.5-25 AS NEEDED
Status: DISCONTINUED | OUTPATIENT
Start: 2021-12-09 | End: 2021-12-24 | Stop reason: HOSPADM

## 2021-12-09 RX ORDER — INSULIN LISPRO 100 [IU]/ML
INJECTION, SOLUTION INTRAVENOUS; SUBCUTANEOUS EVERY 6 HOURS
Status: DISCONTINUED | OUTPATIENT
Start: 2021-12-09 | End: 2021-12-16

## 2021-12-09 RX ADMIN — PIPERACILLIN AND TAZOBACTAM 3.38 G: 3; .375 INJECTION, POWDER, LYOPHILIZED, FOR SOLUTION INTRAVENOUS at 02:36

## 2021-12-09 RX ADMIN — CHLORHEXIDINE GLUCONATE 15 ML: 1.2 RINSE ORAL at 20:02

## 2021-12-09 RX ADMIN — Medication 10 ML: at 13:32

## 2021-12-09 RX ADMIN — DEXTROSE MONOHYDRATE 25 G: 25 INJECTION, SOLUTION INTRAVENOUS at 17:19

## 2021-12-09 RX ADMIN — Medication 1 AMPULE: at 20:01

## 2021-12-09 RX ADMIN — SODIUM CHLORIDE 40 MG: 9 INJECTION INTRAMUSCULAR; INTRAVENOUS; SUBCUTANEOUS at 09:28

## 2021-12-09 RX ADMIN — NOREPINEPHRINE BITARTRATE 7 MCG/MIN: 1 INJECTION, SOLUTION, CONCENTRATE INTRAVENOUS at 11:51

## 2021-12-09 RX ADMIN — PROPOFOL 25 MCG/KG/MIN: 10 INJECTION, EMULSION INTRAVENOUS at 10:32

## 2021-12-09 RX ADMIN — METOCLOPRAMIDE HYDROCHLORIDE 5 MG: 5 INJECTION INTRAMUSCULAR; INTRAVENOUS at 05:40

## 2021-12-09 RX ADMIN — Medication 125 MCG/HR: at 17:21

## 2021-12-09 RX ADMIN — ASCORBIC ACID, VITAMIN A PALMITATE, CHOLECALCIFEROL, THIAMINE HYDROCHLORIDE, RIBOFLAVIN-5 PHOSPHATE SODIUM, PYRIDOXINE HYDROCHLORIDE, NIACINAMIDE, DEXPANTHENOL, ALPHA-TOCOPHEROL ACETATE, VITAMIN K1, FOLIC ACID, BIOTIN, CYANOCOBALAMIN: 200; 3300; 200; 6; 3.6; 6; 40; 15; 10; 150; 600; 60; 5 INJECTION, SOLUTION INTRAVENOUS at 17:33

## 2021-12-09 RX ADMIN — EPOETIN ALFA-EPBX 20000 UNITS: 20000 INJECTION, SOLUTION INTRAVENOUS; SUBCUTANEOUS at 21:05

## 2021-12-09 RX ADMIN — CALCIUM CHLORIDE, MAGNESIUM CHLORIDE, DEXTROSE MONOHYDRATE, LACTIC ACID, SODIUM CHLORIDE, SODIUM BICARBONATE AND POTASSIUM CHLORIDE: 5.15; 2.03; 22; 5.4; 6.46; 3.09; .157 INJECTION INTRAVENOUS at 03:12

## 2021-12-09 RX ADMIN — PIPERACILLIN AND TAZOBACTAM 3.38 G: 3; .375 INJECTION, POWDER, LYOPHILIZED, FOR SOLUTION INTRAVENOUS at 18:52

## 2021-12-09 RX ADMIN — Medication 10 ML: at 21:06

## 2021-12-09 RX ADMIN — CALCIUM CHLORIDE, MAGNESIUM CHLORIDE, DEXTROSE MONOHYDRATE, LACTIC ACID, SODIUM CHLORIDE, SODIUM BICARBONATE AND POTASSIUM CHLORIDE: 5.15; 2.03; 22; 5.4; 6.46; 3.09; .157 INJECTION INTRAVENOUS at 04:57

## 2021-12-09 RX ADMIN — CALCIUM CHLORIDE, MAGNESIUM CHLORIDE, DEXTROSE MONOHYDRATE, LACTIC ACID, SODIUM CHLORIDE, SODIUM BICARBONATE AND POTASSIUM CHLORIDE: 5.15; 2.03; 22; 5.4; 6.46; 3.09; .157 INJECTION INTRAVENOUS at 04:53

## 2021-12-09 RX ADMIN — PROPOFOL 30 MCG/KG/MIN: 10 INJECTION, EMULSION INTRAVENOUS at 00:17

## 2021-12-09 RX ADMIN — SODIUM CHLORIDE 40 MG: 9 INJECTION INTRAMUSCULAR; INTRAVENOUS; SUBCUTANEOUS at 21:06

## 2021-12-09 RX ADMIN — CALCIUM CHLORIDE, MAGNESIUM CHLORIDE, DEXTROSE MONOHYDRATE, LACTIC ACID, SODIUM CHLORIDE, SODIUM BICARBONATE AND POTASSIUM CHLORIDE: 5.15; 2.03; 22; 5.4; 6.46; 3.09; .157 INJECTION INTRAVENOUS at 19:55

## 2021-12-09 RX ADMIN — Medication 40 ML: at 05:40

## 2021-12-09 RX ADMIN — CHLORHEXIDINE GLUCONATE 15 ML: 1.2 RINSE ORAL at 08:18

## 2021-12-09 RX ADMIN — CALCIUM CHLORIDE, MAGNESIUM CHLORIDE, DEXTROSE MONOHYDRATE, LACTIC ACID, SODIUM CHLORIDE, SODIUM BICARBONATE AND POTASSIUM CHLORIDE: 5.15; 2.03; 22; 5.4; 6.46; 3.09; .157 INJECTION INTRAVENOUS at 21:21

## 2021-12-09 RX ADMIN — Medication 10 ML: at 05:41

## 2021-12-09 RX ADMIN — METOCLOPRAMIDE HYDROCHLORIDE 5 MG: 5 INJECTION INTRAMUSCULAR; INTRAVENOUS at 13:32

## 2021-12-09 RX ADMIN — Medication 125 MCG/HR: at 05:01

## 2021-12-09 RX ADMIN — PIPERACILLIN AND TAZOBACTAM 3.38 G: 3; .375 INJECTION, POWDER, LYOPHILIZED, FOR SOLUTION INTRAVENOUS at 09:31

## 2021-12-09 RX ADMIN — PROPOFOL 40 MCG/KG/MIN: 10 INJECTION, EMULSION INTRAVENOUS at 17:08

## 2021-12-09 RX ADMIN — METOCLOPRAMIDE HYDROCHLORIDE 5 MG: 5 INJECTION INTRAMUSCULAR; INTRAVENOUS at 21:06

## 2021-12-09 RX ADMIN — Medication 1 AMPULE: at 08:07

## 2021-12-09 RX ADMIN — PROPOFOL 40 MCG/KG/MIN: 10 INJECTION, EMULSION INTRAVENOUS at 22:08

## 2021-12-09 RX ADMIN — CALCIUM CHLORIDE, MAGNESIUM CHLORIDE, DEXTROSE MONOHYDRATE, LACTIC ACID, SODIUM CHLORIDE, SODIUM BICARBONATE AND POTASSIUM CHLORIDE: 5.15; 2.03; 22; 5.4; 6.46; 3.09; .157 INJECTION INTRAVENOUS at 12:44

## 2021-12-09 RX ADMIN — Medication 10 ML: at 21:18

## 2021-12-09 NOTE — DIALYSIS
CRRT / [id number]         Orders   Mode: CVVH restarted @ 1600   Blood Flow Rate: 200 ml/min   Prismasol Dose:   100% pre-filter 20 ml/kg/hr x 65 kg = 1300 ml/hr (PBP: 650 ml/hr, Replacement: 650 ml/hr)   Prismasol Concentrate: 2K/3.5Ca   Blood Warmer Temp: 37*C   Net Fluid Removal: 0 ml/hr          Metrics   BP: 95/50   HR: 68   Access Pressure: -71   Filter Pressure: 159   Return Pressure: 115   TMP: 20   Pressure Drop: 11          Access   Type & Location: L femoral CVC   Comments: Dressing CDI dated 12/9/21. +aspiration/+flush x2 ports, lines reversed.                          Labs   HBsAg (Antigen) / date: Negative 12/1/21   HBsAb (Antibody) / date: Immune 11/11/21   Source: [facility]          Safety:   Time Out Done:   (Time) 1530   Consent obtained/signed: Verified   Education: Pt intubated/sedated   Primary Nurse Rpt Pre: HANDY Camargo RN   Primary Nurse Rpt Post: HANDY Camargo RN      Comments / Plan:      Filter changed secondary to clotting. All possible blood (165 ml) returned by primary RN. Consents, patient, code status, labs and orders verified. +COVID-19 pt: All proper PPE worn by dialysis RN. Old set discarded in red bio-hazard bag. New [id number] filter set-up, primed, tested and running well at this time. L femoral CVC, dressing CDI with bio-patch dated 12/9/21. No signs of redness, drainage, or infection visualized. Each catheter limb disinfected for 60 seconds per limb with alcohol swabs. Caps removed, dialysis CVC hub scrubbed with Prevant[employer] for 15 seconds, followed by a 5 second dry time per Hospital P&P. +aspiration/+flush x 2 ports. Lines REVERSED, visible and connections secure with blood warmer to return line at 37*C. Pt had run time of 7 hrs since last filter change & TYLOR+ discussed with Dr. David Suresh orders changed to 100% pre-filter. Education & pre/post report given to RN.

## 2021-12-09 NOTE — PROGRESS NOTES
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 He was admitted to Hospitalist Service with Acute COVID infection, mild hypoxemic respiratory failure, recent strep intermedius bacteremia. Transferred to ICU  after developing hematemesis and shock. PRINCIPLE ICU DIAGNOSIS:  Hemorrhagic shock due to UGIB    Hospital course/major results:   Admission as above   Transferred to ICU for hematemesis, shock. Intubated semi-electively for EGD. Required multiple units RBC, FFP, plts. EGD revealed small distal esophageal ulcers and large volume of blood and clots in stomach without clear source of bleeding identified. Required vasopressors. Continued blood loss into night requiring more blood products.  CTAP: 1. There is a large amount of hemorrhage within a distended stomach, extending into the duodenum. An active bleed is not seen on this examination. However, delayed images were not performed, somewhat limiting the study. 2. Bilateral pleural effusions with bilateral pneumonia.  L femoral HD catheter palced and CRRT initiated   Remains intubated and on CRRT. Further bleeding with Hgb 6.5. Received one unit RBCs. Repeat EGD: Severe distal esophageal ulceration which is likely cause of bleeding, 2. Stomach full of blood---mucosa not well seen 3. Duodenum is normal. Worsening abdominal distention. Repeat CTAP: Generalized gaseous distention of large and small bowel. Findings are consistent with a generalized ileus. 2. No evidence of bowel perforation. 3. Continued gastric distention with hemorrhagic material, similar to prior study. 4. NG tube in the stomach.  5. Covid 19 pneumonia has worsened in the visualized lung bases. 6. Continued small to moderate bilateral pleural effusions. 12/08 General Surgery consultation  12/08 Palliative Care consultation requested  12/09 Stabilizing. Vasopressor requirements improving. Abdomen exam much improved - softer, less distended. TPN initiated    Lines/tubes/devices:  ETT 12/06 >>   R femoral CVL 12/06 >>   L femoral HD catheter 12/07 >>       COMPREHENSIVE CCM ASSESSMENT/PLAN (by systems)       PULMONARY:  1. Vent dep respiratory failure - intubated for EGD  2. B pulmonary infiltrates - likely aspiration    Current vent settings: PRVC 400/15/5/40%      PLAN   - Cont current ventilator support. Settings reviewed with RT and adjusted as indicated  - Daily SBT when daily screening criteria met      CARDIOVASCULAR/HEMODYNAMIC:  3. Shock - hemorrhagic     Current vasopressors: NE 7 mcg/min      PLAN  - cardiac/hemodynamic monitoring  - MAP goal > 60 mmHg  - SBP goal > 100 mmHg      RENAL:  4. ESRD  5. Mild metabolic acidosis  6. Mild hyperkalemia    Current RRT:     PLAN  - Monitor chemistry panel daily  - Correct electrolytes as indicated  - Nephrology following  - CRRT per Nephrology - initiated 12/07    GI/NUTRITION:  7. Acute UGIB  8. Distal esophageal ulceration  9. Protein-calorie malnutrition      Current nutritional support: none  SUP: high dose IV PPI    PLAN  - Cont high dose PPI  - Gastroenterology following - their assistance appreciated  - General Surgery consultation 12/08 - no indication for surgery  - Initiate TPN 12/09      INFECTIOUS DISEASE  10. Suspect aspiration PNA  11. Severe sepsis    Micro:  Resp 12/07 >>    Antibiotics:  Pip-tazo 12/07 >>     PLAN  - Follow culture results to completion  - Duration of antimicrobial therapy TBD  - Track PCT    HEME  12. ABLA  13. Thrombocytopenia  14.  H/O DVT on chronic warfarin    DVT prophylaxis: SCDs - has IVC filter    PLAN  - Daily CBC  - Transfuse as needed to maintain Hgb > 7.0 gm/dL or for hemodynamically significant bleeding  - Transfuse plts to maintain > 50k  - Transfuse FFP to maintain INR < 1.5  - Holding warfarin    NEURO  15. Cognition fully intact prior to admission  16. ICU/vent associated discomfort      RASS goal: -1, -2  Current sedatives: propofol  Current analgesics: fentanyl    PLAN   - Daily SAT when meets ICU criteria  - ABCDEF mobility bundle  - PT/OT involvement when appropriate      ENDOCRINE  17. Mild hyperglycemia without prior dx of DM    PLAN  - Target glucose: 100-180  - Glycemic control: N/I presently    GOALS OF CARE/ADVANCED DIRECTIVES  18. CODE STATUS: Full   19. HPI/Consult/Subjective:   24 Hr Events 12/9/2021:   As above    SUBJ:   RASS -2, + F/C, synchronous with vent      Past Medical History:      has a past medical history of Abdominal hematoma, AICD (automatic cardioverter/defibrillator) present, CAD (coronary artery disease), Chronic abdominal pain, Chronic kidney disease, DVT (deep venous thrombosis) ([address] (2001), DVT of popliteal vein ([address] (11/2011), Endocarditis, Gallstone pancreatitis, Gastrointestinal disorder, Gastrointestinal disorder, Hemodialysis patient ([address] High cholesterol, Hypertension, Kidney transplant, Long term current use of anticoagulant therapy, Nephrotic syndrome, Other ill-defined conditions(799.89), Other ill-defined conditions(799.89), Other ill-defined conditions(799.89), Peritonitis ([address] Seizures ([address] (2015), Small bowel obstruction ([address] 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[address] and V-tach ([address]     Past Surgical History:      has a past surgical history that includes pr edg us exam surgical alter stom duodenum/jejunum (7/15/2011); pr esophagogastroduodenoscopy transoral diagnostic (5/24/2012); hx cholecystectomy; hx appendectomy; hx small bowel resection; hx other surgical (11/2011); hx other surgical; hx other surgical (02/2013); hx transplant (2001 ); hx transplant (1992); vascular surgery procedure unlist (11/14/2017); hx pacemaker (Left, 6/2009); hx pacemaker (Left); pr cardiac surg procedure unlist (2007); hx vascular access (Right); ir replace cvc tunneled w/o port (9/10/2020); ir remove tunl cvad w/o port / pump (10/29/2020); upper gi endoscopy,ctrl bleed (12/6/2021); ir insert non tunl cvc over 5 yrs (12/7/2021); and upper gi endoscopy,diagnosis (12/8/2021). Home Medications:     Prior to Admission medications    Medication Sig Start Date End Date Taking? Authorizing Provider   ondansetron (Zofran ODT) 4 mg disintegrating tablet 1 Tab by SubLINGual route every eight (8) hours as needed for Nausea or Vomiting. 3/27/21   Tracy Pulido MD   metoprolol succinate (TOPROL-XL) 25 mg XL tablet Take 0.5 Tabs by mouth daily. 11/10/20   Wanda Atkinson MD   calcium acetate,phosphat bind, (PHOSLO) 667 mg cap Take 2 Caps by mouth three (3) times daily (with meals). Indications: low amount of calcium in the blood, renal osteodystrophy with hyperphosphatemia 11/10/20   Wanda Atkinson MD   atorvastatin (Lipitor) 20 mg tablet Take 20 mg by mouth daily. Provider, Historical   warfarin (COUMADIN) 2.5 mg tablet Take 2.5 mg by mouth daily. Provider, Historical   acetaminophen (TYLENOL) 500 mg tablet Take 1 Tab by mouth every four (4) hours as needed for Pain. Over the counter 1/20/19   Suzanne Navarro MD   omeprazole (PRILOSEC) 20 mg capsule Take 20 mg by mouth daily. Provider, Historical   calcitRIOL (ROCALTROL) 0.5 mcg capsule Take 0.5 mcg by mouth daily. Provider, Historical   aspirin 81 mg chewable tablet Take 81 mg by mouth daily. Other, MD Morro       Allergies/Social/Family History:      Allergies   Allergen Reactions    Shellfish Containing Products Swelling     Break out in rash, trouble breathing    Contrast Agent [Iodine] Shortness of Breath    Heparin Analogues Other (comments)     Positive history of HIT      Social History     Tobacco Use    Smoking status: Never Smoker    Smokeless tobacco: Never Used   Substance Use Topics    Alcohol use: No      Family History   Problem Relation Age of Onset    COPD Mother     Lung Disease Mother     Cancer Father         stomach    Cancer Maternal Grandmother            Objective:   Vital Signs:  Visit Vitals  BP (!) 95/50   Pulse 68   Temp 98.6 °F (37 °C)   Resp 16   Ht 5' 7\" (1.702 m)   Wt 72.2 kg (159 lb 2.8 oz)   SpO2 96%   BMI 24.93 kg/m²    O2 Flow Rate (L/min): 2 l/min O2 Device: Endotracheal tube, Ventilator Temp (24hrs), Av.4 °F (36.3 °C), Min:94.4 °F (34.7 °C), Max:98.6 °F (37 °C)           Intake/Output:     Intake/Output Summary (Last 24 hours) at 2021 1633  Last data filed at 2021 1400  Gross per 24 hour   Intake 1398.17 ml   Output 2102 ml   Net -703.83 ml       Physical Exam:  GEN: chronically ill-appearing, RASS -2, + F/C, synchronous with vent  HEENT: NCAT, sclerae white  NECK: No JVD noted  CHEST: Clear to auscultation and percussion  CARDIAC: sinus rhythm, regular, no murmur noted  ABD: les distended, softer, + tympani - less prominent, hypoactive BS  EXT: Symmetric LE edema  NEURO: Cranial nerves intact, symmetric strength, no focal deficits noted  DERM: No lesions noted    I have examined the patient on this day 2021 and the above documented exam is accurate including the components that have been copied forward    LABS AND  DATA: Personally reviewed  Recent Labs     21  0410 21  1448   WBC 29.2* 22.8*   HGB 7.4* 6.5*   HCT 21.6* 19.1*   PLT 62* 67*     Recent Labs     21  0410 21  1448    138   K 4.5 4.7    103   CO2 26 21   BUN 55* 60*   CREA 3.81* 4.49*   GLU 94 125*   CA 7.9* 8.2*   MG 2.2 2.2   PHOS 4.2 4.9*     Recent Labs     21  0410 21  0418   * 102   TP 5.2* 5.2*   ALB 2.1* 2.1*   GLOB 3.1 3.1     Recent Labs     21   INR 1.1 1.1  1.2*   PTP 11.9* 11.4  12.3*   APTT  --  36*      No results for input(s): PHI, PCO2I, PO2I, FIO2I in the last 72 hours.   No results for input(s): CPK, CKMB, TROIQ, BNPP in the last 72 hours. Hemodynamics:   PAP:   CO:     Wedge:   CI:     CVP:    SVR:       PVR:       Ventilator Settings:  Mode Rate Tidal Volume Pressure FiO2 PEEP   PRVC   400 ml  12 cm H2O 40 % 5 cm H20     Peak airway pressure: 36 cm H2O    Minute ventilation: 22 l/min        MEDS: Reviewed    Chest X-Ray:  CXR Results  (Last 48 hours)               12/09/21 0514  XR CHEST PORT Final result    Impression:  Unchanged patchy bilateral airspace disease. Narrative:  INDICATION: Respiratory failure. Ventilator dependent. COMPARISON: 12/8/2021       FINDINGS: Single AP portable view of the chest obtained at 4:37 AM demonstrates   no change in position of the endotracheal tube. Nasogastric tube extends into   the upper abdomen. The cardiomediastinal silhouette is stable. Patchy bilateral   airspace disease is not significantly changed. No pneumothorax is seen. There is   no evidence of pleural effusion. 12/08/21 1513  XR CHEST PORT Final result    Impression:  NG tube is coiled in the patient's throat, and terminates in the mid/distal   thoracic esophagus. Recommend repositioning. Narrative:  INDICATION:    OGT placement        EXAMINATION:  AP CHEST, PORTABLE       COMPARISON: 12/7/2021       FINDINGS: Single AP portable view of the chest at 1456 hours demonstrates an NG   tube which appears coiled in the patient's mouth, and its tip is in the location   of the mid/distal thoracic esophagus, approximately 17 mm superior to the   gastric air bubble in the left midabdomen. ET tube is not significantly changed. Covid 19 pneumonia is again noted. The cardiomediastinal silhouette is stable. There is no new airspace disease.                  I have reviewed the above films and agree with official interpretation         Multidisciplinary Rounds Completed:  Yes      SPECIAL EQUIPMENT  CRRT    DISPOSITION  Stay in ICU    [facility] NOTE  I had a in-person encounter with Wolfgang Berumen, reviewed and interpreted patient data including events, labs, images, vital signs, I/O's, and examined patient. I have discussed the case and the plan and management of the patient's care with the consulting services, the bedside nurses and the respiratory therapist.      NOTE OF PERSONAL INVOLVEMENT IN CARE   This patient is at high risk for sudden and clinically significant deterioration, which requires the highest level of preparedness to intervene urgently. I participated in the decision-making and personally managed or directed the management of the following life and organ supporting interventions that required my frequent assessment to treat or prevent imminent deterioration. I personally spent 40 minutes of critical care time. This is time spent at patient's bedside actively involved in patient care as well as the coordination of care and discussions with the patient's family. This does not include any procedural time which has been billed separately.     Lita Boateng MD  Pulmonary/[facility]  [phone]  12/9/2021

## 2021-12-09 NOTE — PROGRESS NOTES
Care Management:    MARINO :     RUR:22%  [address] is goal.   Follow up appointments:PCP, oral surgeon-Dr Adolph Jane, [phone]- scheduled for Dec 23rd @10:30am  DME needed:TBD  Transportation at [id number]  [facility] versus ambulance. .. Keys or means to access home:       IM Medicare Letter: Will provide upon d/c   Is patient a BCPI-A Bundle:  N/A               Caregiver Contact:sisterJaneth [phone]  Discharge Caregiver contacted prior to discharge?     Covid and is currently vented in the ICU. Pt accepted for [facility] by [facility]NA Rowe delivered to pt. CM to continue to monitor for d/c needs.     José Antonio Fernandez RN [facility] [id number]

## 2021-12-09 NOTE — PROGRESS NOTES
Nutrition Note    Chart reviewed, case discussed during CCU rounds. Pt with stomach full of clotted blood. Plans to start TPN tonight. Pt is sedated with propofol @ 9.8mL/h, which provides 259 kcals daily. PSU is 1694. Recommend:   Day 1) Start D15, 5% AA @ 42mL/h   Day 2) If BG <200mg/dL and lytes WNL, advance to 63mL/h    Day 3) If BG <200mg/dL and lytes WNL, advance to 83mL/h   Day 4) If BG <200mg/dL and lytes WNL, advance to Goal Rate of 100mL/h (provides 1704kcals/120gPro/360gDextrose)     TPN + propofol will meet 115% kcal and 100% protein needs.    Would monitor lytes for refeeding syndrome    Electronically signed by Emiliano Lopez, PRAVEEN, [address] [place]  on 12/9/2021 at 12:45 PM    Contact: [phone]

## 2021-12-09 NOTE — DIALYSIS
CRRT / [phone]    Orders   Mode: CVVH restarted @ 0755   Blood Flow Rate: 200 ml/min   Prismasol Dose: 20 ml/kg/hr x 65 kg = 1300 ml/hr (PBP: 650 ml/hr, Replacement: 650 ml/hr)   Prismasol Concentrate: 2K/3.5Ca   Blood Warmer Temp: 37*C   Net Fluid Removal: 0 ml/hr     Metrics   BP: 99/50   HR: 90   Access Pressure: -64   Filter Pressure: 166   Return Pressure: 115   TMP: 17   Pressure Drop: 13     Access   Type & Location: L femoral CVC   Comments: Dressing CDI dated 12/9/21. +aspiration/+flush x2 ports, lines reversed. Labs   HBsAg (Antigen) / date: Negative 12/1/21   HBsAb (Antibody) / date: Immune 11/11/21   Source: [facility] [place] [facility]     Safety:   Time Out Done:   (Time) 0630   Consent obtained/signed: Verified   Education: Pt intubated/sedated   Primary Nurse Rpt Pre: HANDY Bah RN   Primary Nurse Rpt Post: HANDY Bah, RN     Comments / Plan:      Filter changed secondary to clotting. All possible blood (165 ml) returned by primary RN. Consents, patient, code status, labs and orders verified. +COVID-19 pt: All proper PPE worn by dialysis RN. Old set discarded in red bio-hazard bag. New [id number] filter set-up, primed, tested and running well at this time. L femoral CVC, dressing CDI with bio-patch dated 12/9/21. No signs of redness, drainage, or infection visualized. Each catheter limb disinfected for 60 seconds per limb with alcohol swabs. Caps removed, dialysis CVC hub scrubbed with Prevantics for 15 seconds, followed by a 5 second dry time per Hospital P&P. +aspiration/+flush x 2 ports. Lines REVERSED, visible and connections secure with blood warmer to return line at 37*C. Education & pre/post report given to RN.

## 2021-12-09 NOTE — CONSULTS
Palliative Medicine Consult  Albert: [phone]    Patient Name: Yoanna Muller  YOB: 1975    Attempted to reach sister today to start conversations but was unsucessful  Will try again tomorrow

## 2021-12-09 NOTE — PROGRESS NOTES
Nephrology Progress Note  Blaise Leblanc     [facility]  Phone - [phone]   Patient: Favio Lemus    YOB: 1975        Date- 12/9/2021   Admit Date: 11/27/2021  CC: Follow up for  ESRD        IMPRESSION & PLAN:   · ESRD-- home HD 5 days per week- Follows up with Dr Meredith Bal at [facility] ( [facility] HD for IV antibiotics.)  · Covid 19 infection  · HYPOVOLEMIC SHOCK  · UPPER GI BLEED  · hyperkalemia  · Left arm swelling- s/p angioplasty of left subclavian vein  · Gram positive sepsis from dental abcess  · s/p lead extraction at [facility]  · Anemia of ckd  · Hx of renal tx in past times 2.  · Hypertension  · CAD  · Failed RTX times 2         H/o DVT, s/p ivc filter/ h/o HIT      PLAN-   CONTINUE CRRT   Factor 25 ml/hr   Check labs bid   Hold calcitriol   EPO for anemia   D/w ICU nurse     Subjective: Interval History:   He is on vent  Hb low 6.5  EGD results noted  bp low requiring vasopressors  On CRRT      Objective:   Vitals:    12/09/21 0700 12/09/21 0746 12/09/21 0800 12/09/21 0803   BP: (!) 91/48 (!) 99/50     Pulse: 78 90  78   Resp: 15   16   Temp:   98.3 °F (36.8 °C)    TempSrc:       SpO2: 96%   97%   Weight:       Height:          12/08 0701 - 12/09 0700  In: 1706.1 [I.V.:988.2]  Out: 1769   Last 3 Recorded Weights in this Encounter    12/06/21 0619 12/07/21 2235 12/09/21 0000   Weight: 65 kg (143 lb 4.8 oz) 71.7 kg (158 lb 1.1 oz) 72.2 kg (159 lb 2.8 oz)      Physical exam:   GEN: on vent  NECK- ET tube +  RESP: no distress  NEURO:Can't access due to patient's current condition         Chart reviewed. Pertinent Notes reviewed.      Data Review :  Recent Labs     12/09/21  0410 12/08/21  1448 12/08/21  0418    138 136   K 4.5 4.7 4.9    103 101   CO2 26 21 25   BUN 55* 60* 68*   CREA 3.81* 4.49* 4.98*   GLU 94 125* 120*   CA 7.9* 8.2* 8.4*   MG 2.2 2.2  --    PHOS 4.2 4.9*  --      Recent Labs     12/09/21  0410 12/08/21  1445 12/08/21  0418   WBC 29.2* 22.8* 24.2*   HGB 7.4* 6.5* 7.5*   HCT 21.6* 19.1* 22.2*   PLT 62* 67* PENDING  76*     Recent Labs     12/06/21  0939   FERR 1,339*      Medication list  reviewed  Current Facility-Administered Medications   Medication Dose Route Frequency    TPN ADULT - CENTRAL   IntraVENous CONTINUOUS    NOREPINephrine (LEVOPHED) 8 mg in 5% dextrose 250mL (32 mcg/mL) infusion  0.5-30 mcg/min IntraVENous TITRATE    fentaNYL citrate (PF) injection 25 mcg  25 mcg IntraVENous Q2H PRN    sodium chloride (NS) flush 5-40 mL  5-40 mL IntraVENous Q8H    sodium chloride (NS) flush 5-40 mL  5-40 mL IntraVENous PRN    simethicone (MYLICON) 22VS/8.9VE oral drops 80 mg  1.2 mL Oral Multiple    atropine injection 0.5 mg  0.5 mg IntraVENous ONCE PRN    EPINEPHrine (ADRENALIN) 0.1 mg/mL syringe 1 mg  1 mg Endoscopically ONCE PRN    0.9% sodium chloride infusion 250 mL  250 mL IntraVENous PRN    calcitRIOL (ROCALTROL) capsule 0.5 mcg  0.5 mcg Per G Tube DAILY    [Held by provider] calcium acetate(phosphat bind) (PHOSLO) capsule 1,334 mg  2 Capsule Per NG tube TID WITH MEALS    metoclopramide HCl (REGLAN) injection 5 mg  5 mg IntraVENous Q8H    bicarbonate dialysis (PRISMASOL) BG K 2/Ca 3.5 5000 ml solution   Extracorporeal DIALYSIS CONTINUOUS    albuterol-ipratropium (DUO-NEB) 2.5 MG-0.5 MG/3 ML  3 mL Nebulization Q4H PRN    piperacillin-tazobactam (ZOSYN) 3.375 g in 0.9% sodium chloride (MBP/ADV) 100 mL MBP  3.375 g IntraVENous Q8H    pantoprazole (PROTONIX) 40 mg in 0.9% sodium chloride 10 mL injection  40 mg IntraVENous Q12H    Warfarin- Hold until bleeding has resolved   Other Rx Dosing/Monitoring    albumin human 25% (BUMINATE) solution 25 g  25 g IntraVENous DIALYSIS PRN    propofol (DIPRIVAN) 10 mg/mL infusion  0-50 mcg/kg/min IntraVENous TITRATE    fentaNYL (PF) 1,500 mcg/30 mL (50 mcg/mL) infusion  0-200 mcg/hr IntraVENous TITRATE    alcohol 62% (NOZIN) nasal  1 Ampule  1 Ampule Topical Q12H    chlorhexidine (PERIDEX) 0.12 % mouthwash 15 mL  15 mL Oral Q12H    epoetin emilie-epbx (RETACRIT) 12,000 Units combo injection  12,000 Units SubCUTAneous Q MON, WED & FRI    sodium chloride (NS) flush 5-10 mL  5-10 mL IntraVENous PRN    acetaminophen (TYLENOL) tablet 650 mg  650 mg Oral Q6H PRN    [Held by provider] L.acidophilus-paracasei-S.thermophil-bifidobacter (RISAQUAD) 8 billion cell capsule  1 Capsule Oral DAILY    sodium chloride (NS) flush 5-40 mL  5-40 mL IntraVENous Q8H    sodium chloride (NS) flush 5-40 mL  5-40 mL IntraVENous PRN    polyethylene glycol (MIRALAX) packet 17 g  17 g Oral DAILY PRN    ondansetron (ZOFRAN) injection 4 mg  4 mg IntraVENous Q6H PRN          Ne Alfaro MD              [facility]  [facility] / [facility], [address], [address]  Phone - [phone]               Fax - [phone]

## 2021-12-09 NOTE — PROGRESS NOTES
GI PROGRESS NOTE  Ced Ramires NP  [phone] NP in-hospital cell phone M-F until 4:30  After 5pm or on weekends, please call  for physician on call    NAME:Claude Inga Calamity :1975 OEZ:[id number]   ATTG: Dr Wood Found PCP: Sally Wise MD Date/Time:  2021 0925 AM     Primary GI: Dr. Santillan Batch - Dr Sonia Vyas covering     Assessment:   Acute onset of Hematemesis - 2/2 ulcers in distal esophagus but could be more further down that we were unable to visualize due to blood in stomach  Acute Blood Loss anemia - hgb dropped to 7.4  Melena  Chronic thrombocytopenia - Plt 62   On Warfarin and ASA 81mg - last dose 21 PM  Hyperkalemia - resolved  · No reported bleeding overnight  · INR 1.1 today  · 2021:  CT abd/pel showed generalized gaseous distention of large and small bowel. Findings are consistent with a generalized ileus. No evidence of bowel perforation. Continued gastric distention with hemorrhagic material, similar to prior study. · Critical pt on pressors, ventilator, and sedated    EGD 21 : Esophagus:  Full of fresh blood and large clots. Eventually cleared and two ulcers (10 mm) and (5 mm) seen in distal esophagus at 42 cm. Very friable, but no active bleeding. Injected Epi 1 : 10,000 total of 8.5 cc into area around both ulcers. Stomach: Full of huge clots, so only about 20 % of mucosa seen. Clots in stomach are dark and look old. Cannot find pylorus. Duodenum: not entered due to blood obscuring    EGD 2021:  Esophagus:  Upper and mid esophagus appears normal. Distal esophagus looks diffusely ulcerated with large amount of old clot. No active bleeding seen. Stomach: FULL of old blood, large clots. Cannot evaluate gastric mucosa. Pylorus found and patent. Scope was able to be advanced into duodenum  Duodenum: normal bulb and second portion.  No ulcers seen     Bacteremia - Gram + sepsis with dental abscess  COVID+ -  Admitted 21  ESRD on dialysis  Chronic pain  HTN     Plan:   · Continue ICU level care  · Continue acid suppression  · Follow CBC - transfuse with blood, plts, ffp as necessary  · Hold all anticoagulation - previously on coumadin  · Maintain NPO  · Reglan q8hrs  · Rest per primary team  · Symptomatic care per primary team   *Plan of care discussed with Dr. Cole Conway      Subjective:   Discussed with RN events overnight. Resting in room. Sedated, on vent. On pressors. Review of Systems:  Symptom Y/N Comments  Symptom Y/N Comments   Fever/Chills    Chest Pain     Cough    Headaches     Sputum    Joint Pain     SOB/ZAMUDIO    Pruritis/Rash     Tolerating Diet    Other       Could NOT obtain due to: Sedated and intubated     Objective:   VITALS:   Last 24hrs VS reviewed since prior progress note. Most recent are:  Visit Vitals  /72   Pulse 88   Temp 98.2 °F (36.8 °C)   Resp 19   Ht 5' 7\" (1.702 m)   Wt 72.2 kg (159 lb 2.8 oz)   SpO2 95%   BMI 24.93 kg/m²       Intake/Output Summary (Last 24 hours) at 12/9/2021 1138  Last data filed at 12/9/2021 1000  Gross per 24 hour   Intake 1579.3 ml   Output 1792 ml   Net -212.7 ml     PHYSICAL EXAM:  ~~Deferred secondary to + COVID~~    Lab and Radiology Data Reviewed: (see below)    Medications Reviewed: (see below)  PMH/SH reviewed - no change compared to H&P  ________________________________________________________________________  Total time spent with patient: 30 minutes ________________________________________________________________________  Care Plan discussed with:  Patient    Family     RN x              Consultant:       Hugh Lazaro NP     Procedures: see electronic medical records for all procedures/Xrays and details which were not copied into this note but were reviewed prior to creation of Plan.       LABS:  Recent Labs     12/09/21 0410 12/08/21  1448   WBC 29.2* 22.8*   HGB 7.4* 6.5*   HCT 21.6* 19.1*   PLT 62* 67*     Recent Labs     12/09/21 0410 12/08/21  1448 12/08/21  0418    138 136   K 4.5 4.7 4.9    103 101   CO2 26 21 25   BUN 55* 60* 68*   CREA 3.81* 4.49* 4.98*   GLU 94 125* 120*   CA 7.9* 8.2* 8.4*   MG 2.2 2.2  --    PHOS 4.2 4.9*  --      Recent Labs     12/09/21  0410 12/08/21  0418   * 102   TP 5.2* 5.2*   ALB 2.1* 2.1*   GLOB 3.1 3.1     Recent Labs     12/09/21  0410 12/08/21  0418 12/07/21  0206   INR 1.1 1.1  1.2* 1.6*   PTP 11.9* 11.4  12.3* 15.9*   APTT  --  36*  --       No results for input(s): FE, TIBC, PSAT, FERR in the last 72 hours. Lab Results   Component Value Date/Time    Folate 7.5 11/23/2011 11:26 AM     Recent Labs     12/06/21  1639   PH 7.41   PCO2 35   PO2 436*     No results for input(s): CPK, CKMB in the last 72 hours.     No lab exists for component: TROPONINI  Lab Results   Component Value Date/Time    Color RED 11/16/2012 05:15 PM    Appearance OPAQUE 11/16/2012 05:15 PM    Specific gravity 1.020 11/16/2012 05:15 PM    Specific gravity 1.011 09/17/2012 01:35 AM    pH (UA) 8.0 11/16/2012 05:15 PM    Protein >300 (A) 11/16/2012 05:15 PM    Glucose 100 (A) 11/16/2012 05:15 PM    Ketone NEGATIVE  11/16/2012 05:15 PM    Bilirubin NEGATIVE  09/17/2012 01:35 AM    Urobilinogen 0.2 11/16/2012 05:15 PM    Nitrites NEGATIVE  11/16/2012 05:15 PM    Leukocyte Esterase NEGATIVE  11/16/2012 05:15 PM    Epithelial cells 5-10 11/16/2012 05:15 PM    Bacteria 3+ (A) 11/16/2012 05:15 PM    WBC >100 (H) 11/16/2012 05:15 PM    RBC >100 (H) 11/16/2012 05:15 PM       MEDICATIONS:  Current Facility-Administered Medications   Medication Dose Route Frequency    TPN ADULT - CENTRAL   IntraVENous CONTINUOUS    epoetin emilie-epbx (RETACRIT) injection 20,000 Units  20,000 Units SubCUTAneous Q TUE, THU & SAT    glucagon (GLUCAGEN) injection 1 mg  1 mg IntraMUSCular PRN    insulin lispro (HUMALOG) injection   SubCUTAneous Q6H    dextrose (D50W) injection syrg 12.5-25 g  12.5-25 g IntraVENous PRN    NOREPINephrine (LEVOPHED) 8 mg in 5% dextrose 250mL (32 mcg/mL) infusion  0.5-30 mcg/min IntraVENous TITRATE    fentaNYL citrate (PF) injection 25 mcg  25 mcg IntraVENous Q2H PRN    sodium chloride (NS) flush 5-40 mL  5-40 mL IntraVENous Q8H    sodium chloride (NS) flush 5-40 mL  5-40 mL IntraVENous PRN    simethicone (MYLICON) 95OG/1.6CP oral drops 80 mg  1.2 mL Oral Multiple    atropine injection 0.5 mg  0.5 mg IntraVENous ONCE PRN    EPINEPHrine (ADRENALIN) 0.1 mg/mL syringe 1 mg  1 mg Endoscopically ONCE PRN    0.9% sodium chloride infusion 250 mL  250 mL IntraVENous PRN    [Held by provider] calcium acetate(phosphat bind) (PHOSLO) capsule 1,334 mg  2 Capsule Per NG tube TID WITH MEALS    metoclopramide HCl (REGLAN) injection 5 mg  5 mg IntraVENous Q8H    bicarbonate dialysis (PRISMASOL) BG K 2/Ca 3.5 5000 ml solution   Extracorporeal DIALYSIS CONTINUOUS    albuterol-ipratropium (DUO-NEB) 2.5 MG-0.5 MG/3 ML  3 mL Nebulization Q4H PRN    piperacillin-tazobactam (ZOSYN) 3.375 g in 0.9% sodium chloride (MBP/ADV) 100 mL MBP  3.375 g IntraVENous Q8H    pantoprazole (PROTONIX) 40 mg in 0.9% sodium chloride 10 mL injection  40 mg IntraVENous Q12H    Warfarin- Hold until bleeding has resolved   Other Rx Dosing/Monitoring    albumin human 25% (BUMINATE) solution 25 g  25 g IntraVENous DIALYSIS PRN    propofol (DIPRIVAN) 10 mg/mL infusion  0-50 mcg/kg/min IntraVENous TITRATE    fentaNYL (PF) 1,500 mcg/30 mL (50 mcg/mL) infusion  0-200 mcg/hr IntraVENous TITRATE    alcohol 62% (NOZIN) nasal  1 Ampule  1 Ampule Topical Q12H    chlorhexidine (PERIDEX) 0.12 % mouthwash 15 mL  15 mL Oral Q12H    sodium chloride (NS) flush 5-10 mL  5-10 mL IntraVENous PRN    acetaminophen (TYLENOL) tablet 650 mg  650 mg Oral Q6H PRN    [Held by provider] L.acidophilus-paracasei-S.thermophil-bifidobacter (RISAQUAD) 8 billion cell capsule  1 Capsule Oral DAILY    sodium chloride (NS) flush 5-40 mL  5-40 mL IntraVENous Q8H    sodium chloride (NS) flush 5-40 mL  5-40 mL IntraVENous PRN    polyethylene glycol (MIRALAX) packet 17 g  17 g Oral DAILY PRN    ondansetron (ZOFRAN) injection 4 mg  4 mg IntraVENous Q6H PRN

## 2021-12-09 NOTE — PROGRESS NOTES
Bedside and Verbal shift change report given to WILDER Ny RN (oncoming nurse) by WILDER Chavez RN (offgoing nurse). Report included the following information SBAR, Kardex, Intake/Output, MAR, Recent Results and Cardiac Rhythm NSR.   1940: Assessment completed. CVVH restarted per Yaritza Tolentino RN after CT. Turned to R side to offload positional pressure on dialysis access lines. Lines reversed, pressures WNL. Vent settings: PRVC 15, , FiO2 40%, PEEP +5. Lungs clear, small amount thin tan ET secretions. RR 18, PIP 15. O2 sats labile between 88-92% per shift report, currently 92-95%. Patient nods appropriately, calm at this time. Sedated on Propofol @ 30mcg/kg/min and Fentanyl @ 125mcg/hr. Restraints discontinued at this time. 2130: R fem CVL dressing loose. Changed per policy. L fem Jorge A dressing with gauze under tegaderm. Dressing changed per policy. 0000: Reassessment unchanged except temp 94.7 ax. June hugger applied on medium setting. Tolerating CVVH, factor up to 100ml/hr and titrating Levophed down to maintain MAP>65. Currently infusing at 8mcg/min. 0230: Incontinent of large watery black stool. Burke femoral central line dressings soiled with stool. Patient bathed. Burke central line dressings changed. Rectal temp 94. 4.  0400: Reassessment unchanged. 0500: Patient incontinent of large amount black watery stool. Incont care provided. Bed pad changed. Zinc cream applied to buttocks, sacrum, groins, inner thighs. R femoral CVL dressing soiled; changed per policy. Noted TMP and PD increasing steadily. 7096: Propofol stopped for SAT. 8883: Patient awakens easily. Nods appropriately. Follows commands. 7037: PD and TMP trending upwards. 's, TMP 270s. CVVH stopped, all possible blood returned. Red and blue ports flushed with NS after 15sec Prevantics scrub.   0603: Patient gagging on ETT. ETT suctioned for small amount thin brown secretions. Orally suctioned small amount clear secretions.  RR 30's, patient anxious, nauseated and fighting ventilator. Placed back on previous settings by RT. Propofol restarted at 30mcg/kg/min for anxiety/sedation. Notiifed Lizette An 1154 that TMP and PD rising, have stopped CVVH and rinsed back. 0740: Bedside and Verbal shift change report given to HANDY Chin (oncoming nurse) by WILDER Fong RN (offgoing nurse). Report included the following information SBAR, Kardex, Intake/Output, MAR, Recent Results and Cardiac Rhythm SB-SR. Levophed up to 7mcg/min, Propofol @ 25mcg/kg/min, Fentanyl remains at 125mcg/hr.

## 2021-12-09 NOTE — PROGRESS NOTES
Surgical Progress Note  21      Patient seen and examined by me today. Attending provider:  Bharathi Hannah MD   PCP:  Sally Wise MD         Assessment     Plan  Abdominal distention much improved. Was likely related to upper endoscopy. Nursing notes melena. Consider removing NG since it is not putting anything out anyway, so it does not traumatize the GEJ ulcers. It is in the clot on CT. Likely clogged. Aspiration precautions. Will sign off. Please call if needed. Thanks. Subjective:     Chief Complaint: intubated    Review of Systems:   yes/no  yes/no   Fever  Abdominal pain    Chills  Flatus    Chest pain  Nausea    Cough  Vomiting    Sputum  Constipation    SOB  Diarrhea    Dysuria  Tolerating diet       Objective:     VITALS:   Last 24hrs VS reviewed since prior hospitalist progress note. Most recent are:  Visit Vitals  BP (!) 96/51   Pulse 71   Temp 98.6 °F (37 °C)   Resp 13   Ht 5' 7\" (1.702 m)   Wt 72.2 kg (159 lb 2.8 oz)   SpO2 96%   BMI 24.93 kg/m²     Temp (24hrs), Av.4 °F (36.3 °C), Min:94.4 °F (34.7 °C), Max:98.6 °F (37 °C)      Intake/Output Summary (Last 24 hours) at 2021 1718  Last data filed at 2021 1700  Gross per 24 hour   Intake 1356.58 ml   Output 2261 ml   Net -904.42 ml        PHYSICAL EXAM:  General appearance  I&S       Eyes         Neck        Lungs      Heart     Abdomen   Soft, mildly distended. No pain response to palpation.      Extremities                Neurologic         Lab Data Reviewed: (see below)    Medications Reviewed: (see below)    PMH/SH reviewed - no change compared to H&P  ________________________________________________________________________  LABS:  Recent Labs     21  0410 21  1448   WBC 29.2* 22.8*   HGB 7.4* 6.5*   HCT 21.6* 19.1*   PLT 62* 67*     Recent Labs     21  1558 21  0410 21  1448    137 138   K 4.1 4.5 4.7    103 103   CO2 27 26 21   BUN 51* 55* 60*   CREA 3.78* 3.81* 4.49*   GLU 80 94 125*   CA 7.8* 7.9* 8.2*   MG 1.9 2.2 2.2   PHOS 3.3 4.2 4.9*     Recent Labs     12/09/21 0410 12/08/21 0418   * 266*   * 102   TBILI 1.6* 1.0   TP 5.2* 5.2*   ALB 2.1* 2.1*   GLOB 3.1 3.1     Recent Labs     12/09/21 0410 12/08/21 0418 12/07/21  0206   INR 1.1 1.1  1.2* 1.6*   PTP 11.9* 11.4  12.3* 15.9*   APTT  --  36*  --       No results for input(s): FE, TIBC, PSAT, FERR in the last 72 hours. No results for input(s): PH, PCO2, PO2 in the last 72 hours. No results for input(s): CPK, CKMB in the last 72 hours.     No lab exists for component: TROPONINI  Lab Results   Component Value Date/Time    Glucose (POC) 58 (L) 12/09/2021 05:15 PM    Glucose (POC) 71 12/09/2021 11:50 AM    Glucose (POC) 75 11/05/2021 08:50 PM    Glucose (POC) 77 09/08/2020 06:46 AM    Glucose (POC) 88 11/17/2018 07:29 AM    Glucose (POC) 74 11/14/2017 07:00 AM    Glucose (POC) 75 10/08/2014 02:19 PM    Glucose, POC 61 (L) 11/05/2021 07:02 PM       MEDICATIONS:  Current Facility-Administered Medications   Medication Dose Route Frequency    epoetin emilie-epbx (RETACRIT) injection 20,000 Units  20,000 Units SubCUTAneous Q TUE, THU & SAT    glucagon (GLUCAGEN) injection 1 mg  1 mg IntraMUSCular PRN    insulin lispro (HUMALOG) injection   SubCUTAneous Q6H    dextrose (D50W) injection syrg 12.5-25 g  12.5-25 g IntraVENous PRN    TPN ADULT - CENTRAL AA 5% D15% W/ ELECTROLYTES AND CA   IntraVENous CONTINUOUS    NOREPINephrine (LEVOPHED) 8 mg in 5% dextrose 250mL (32 mcg/mL) infusion  0.5-30 mcg/min IntraVENous TITRATE    fentaNYL citrate (PF) injection 25 mcg  25 mcg IntraVENous Q2H PRN    sodium chloride (NS) flush 5-40 mL  5-40 mL IntraVENous Q8H    sodium chloride (NS) flush 5-40 mL  5-40 mL IntraVENous PRN    simethicone (MYLICON) 49YC/6.5EY oral drops 80 mg  1.2 mL Oral Multiple    0.9% sodium chloride infusion 250 mL  250 mL IntraVENous PRN    [Held by provider] calcium acetate(phosphat bind) (PHOSLO) capsule 1,334 mg  2 Capsule Per NG tube TID WITH MEALS    metoclopramide HCl (REGLAN) injection 5 mg  5 mg IntraVENous Q8H    bicarbonate dialysis (PRISMASOL) BG K 2/Ca 3.5 5000 ml solution   Extracorporeal DIALYSIS CONTINUOUS    albuterol-ipratropium (DUO-NEB) 2.5 MG-0.5 MG/3 ML  3 mL Nebulization Q4H PRN    piperacillin-tazobactam (ZOSYN) 3.375 g in 0.9% sodium chloride (MBP/ADV) 100 mL MBP  3.375 g IntraVENous Q8H    pantoprazole (PROTONIX) 40 mg in 0.9% sodium chloride 10 mL injection  40 mg IntraVENous Q12H    Warfarin- Hold until bleeding has resolved   Other Rx Dosing/Monitoring    albumin human 25% (BUMINATE) solution 25 g  25 g IntraVENous DIALYSIS PRN    propofol (DIPRIVAN) 10 mg/mL infusion  0-50 mcg/kg/min IntraVENous TITRATE    fentaNYL (PF) 1,500 mcg/30 mL (50 mcg/mL) infusion  0-200 mcg/hr IntraVENous TITRATE    alcohol 62% (NOZIN) nasal  1 Ampule  1 Ampule Topical Q12H    chlorhexidine (PERIDEX) 0.12 % mouthwash 15 mL  15 mL Oral Q12H    sodium chloride (NS) flush 5-10 mL  5-10 mL IntraVENous PRN    acetaminophen (TYLENOL) tablet 650 mg  650 mg Oral Q6H PRN    [Held by provider] L.acidophilus-paracasei-S.thermophil-bifidobacter (RISAQUAD) 8 billion cell capsule  1 Capsule Oral DAILY    sodium chloride (NS) flush 5-40 mL  5-40 mL IntraVENous Q8H    sodium chloride (NS) flush 5-40 mL  5-40 mL IntraVENous PRN    polyethylene glycol (MIRALAX) packet 17 g  17 g Oral DAILY PRN    ondansetron (ZOFRAN) injection 4 mg  4 mg IntraVENous Q6H PRN

## 2021-12-09 NOTE — PROGRESS NOTES
Dr. Rina Morgan rounded on patient. 2315 Dr. Mel Cole rounded on patient. 1045 Interdisciplinary team rounds were held 12/9/2021 with the following team members:Care Management, Diabetes Treatment Specialist, Nursing, Nutrition, Pharmacy, Physical Therapy, Physician and Respiratory Therapy. Plan of care discussed. See clinical pathway and/or care plan for interventions and desired outcomes. Goals of the Day: Begin TPN for nutrition. 1322 Patient O2 sats 85-86. Patient coughing against Et tube and biting down on tube. Propofol increased to 30mcg/kg/mn. Patient suctioned for small amount of secretions. 1326 Patient settling down at this time and tolerating mechanical ventilation. O 2 sat 100%. 1440 CVVH alerting that filter is clotting. Patient blood returned without difficulty. 300 [facility] notified of CVVH clotting off.   1525 Patient O2 sats down to 86%. Patient biting on Et tube. Propofol increased to 40mcg/kg/min. 32 61 16 Patient resting, will open eyes and nod appropriately and no longer biting on ET tube. Patient tolerating mechanical ventilation at this time. 3990 [facility] in and CVVH restarted at this time. 1719 fingerstick  Blood sugar 58. Blood sugar recheck 55. 1 amp of D50 given at this time. 1735  Fingerstick blood sugar 106.  1806 Patient coughing against ET tube. Suctioned for large amount of thin secretions. Patient biting down on Et tube. Propofol increased to 45mcg/kg/min. 1815 Patient resting quiet and tolerating mechanical ventilation at this time. 1900 End of Shift Note    Bedside shift change report given to Av Maya (oncoming nurse) by Ortega Pitt RN (offgoing nurse).   Report included the following information SBAR, Kardex, MAR and Recent Results    Shift worked:  7am to [facility]     Shift summary and any significant changes:     see above note     Concerns for physician to address: See above note   Zone phone for oncoming shift:  N/a     Activity:  Activity Level: Bed Rest  Number times ambulated in hallways past shift: 0  Number of times OOB to chair past shift: 0    Cardiac:   Cardiac Monitoring: Yes      Cardiac Rhythm: Sinus Rhythm    Access:   Current line(s): central line and HD access     Genitourinary:   Urinary status: anuric    Respiratory:   O2 Device: Endotracheal tube, Ventilator  Chronic home O2 use?: NO  Incentive spirometer at bedside: NO     GI:  Last Bowel Movement Date: 12/09/21  Current diet:  TPN ADULT - CENTRAL AA 5% D15% W/ ELECTROLYTES AND CA  Passing flatus: YES  Tolerating current diet: NPO       Pain Management:   Patient states pain is manageable on current regimen: YES    Skin:  Yong Score: 11  Interventions: turn team and float heels    Patient Safety:  Fall Score:  Total Score: 3  Interventions: bed/chair alarm  High Fall Risk: Yes    Length of Stay:  Expected LOS: 4d 21h  Actual LOS: [address], RN

## 2021-12-09 NOTE — DIALYSIS
[facility] / [phone]           Orders   Mode: CVVH restarted @ 3437   Blood Flow Rate: 200 ml/min    Dialysate Flow Rate: 20 ml/kg/hr x 65 kg = 1,300 ml/hr  PBP: 650 ml/hr  Dialysate: 650 ml/hr   Prismasol Concentrate: 2K / 3.5Ca   Blood Warmer Temp: 37*C   Net Fluid Removal: Add as tolerated            Metrics   BP: 115/64   HR: 74   Access Pressure: -72   Filter Pressure: 169   Return Pressure: 125   TMP: 45   Pressure Drop: 4            Access   Type & Location: Left femoral temp. CVC, tegaderm dressing with biopatch C/D/I, dated 12/7/21. Each catheter limb disinfected for 60 seconds per limb with alcohol swabs and hubs scrubbed with Prevantics for 15 seconds, followed by a 5 second dry time per Hospital P&P. +asp/+flush x 2 ports. Lines reversed.             Labs   HBsAg (Antigen) / date: Negative 12/1/21                HBsAb (Antibody) / date: Immune 11/11/21   Source: [facility]            Safety:   Time Out Done:    1930   Consent obtained/signed: Verified   Education: Pt intubated/sedated   Primary Nurse Rpt Pre: RAINER Cuevas RN   Primary Nurse Rpt Post: WILDER Shin RN      Comments / Plan:       Patient, code status, labs, and orders verified. Consents on chart. Time out completed. Pt COVID-19 +; All appropriate PPE worn by dialysis RN during care. In at bedside to restart CVVH s/p CT scan, primary RN returned all possible blood 165 mls. Old set discarded in biohazard bin. New HF-1000 filter set-up, primed w/ 1L NS, tested and running well. Lines reversed, visible and connections secure with blood warmer supported on return line, set at [facility]. Education, pre and post report to primary RN.

## 2021-12-10 ENCOUNTER — APPOINTMENT (OUTPATIENT)
Dept: INTERVENTIONAL RADIOLOGY/VASCULAR | Age: 46
DRG: 207 | End: 2021-12-10
Attending: INTERNAL MEDICINE
Payer: MEDICARE

## 2021-12-10 ENCOUNTER — APPOINTMENT (OUTPATIENT)
Dept: GENERAL RADIOLOGY | Age: 46
DRG: 207 | End: 2021-12-10
Attending: RADIOLOGY
Payer: MEDICARE

## 2021-12-10 LAB
ALBUMIN SERPL-MCNC: 1.8 G/DL (ref 3.5–5)
ALBUMIN/GLOB SERPL: 0.7 {RATIO} (ref 1.1–2.2)
ALP SERPL-CCNC: 118 U/L (ref 45–117)
ALT SERPL-CCNC: 169 U/L (ref 12–78)
ANION GAP SERPL CALC-SCNC: 8 MMOL/L (ref 5–15)
ANION GAP SERPL CALC-SCNC: 9 MMOL/L (ref 5–15)
APTT PPP: 31 SEC (ref 24–33)
AST SERPL-CCNC: 192 U/L (ref 15–37)
BASOPHILS # BLD: 0 K/UL (ref 0–0.1)
BASOPHILS NFR BLD: 0 % (ref 0–1)
BILIRUB DIRECT SERPL-MCNC: 0.4 MG/DL (ref 0–0.2)
BILIRUB SERPL-MCNC: 0.8 MG/DL (ref 0.2–1)
BUN SERPL-MCNC: 50 MG/DL (ref 6–20)
BUN SERPL-MCNC: 54 MG/DL (ref 6–20)
BUN/CREAT SERPL: 13 (ref 12–20)
BUN/CREAT SERPL: 14 (ref 12–20)
CALCIUM SERPL-MCNC: 7.3 MG/DL (ref 8.5–10.1)
CALCIUM SERPL-MCNC: 7.4 MG/DL (ref 8.5–10.1)
CHLORIDE SERPL-SCNC: 104 MMOL/L (ref 97–108)
CHLORIDE SERPL-SCNC: 105 MMOL/L (ref 97–108)
CO2 SERPL-SCNC: 24 MMOL/L (ref 21–32)
CO2 SERPL-SCNC: 25 MMOL/L (ref 21–32)
CREAT SERPL-MCNC: 3.7 MG/DL (ref 0.7–1.3)
CREAT SERPL-MCNC: 4.27 MG/DL (ref 0.7–1.3)
D-DIMER, 115188: 10.02 MG/L FEU (ref 0–0.49)
DIFFERENTIAL METHOD BLD: ABNORMAL
EOSINOPHIL # BLD: 0 K/UL (ref 0–0.4)
EOSINOPHIL NFR BLD: 0 % (ref 0–7)
ERYTHROCYTE [DISTWIDTH] IN BLOOD BY AUTOMATED COUNT: 18.3 % (ref 11.5–14.5)
FIBRINOGEN ACTIVITY, 001610: 333 MG/DL (ref 193–507)
FSP PPP LA-ACNC: 10 UG/ML
GLOBULIN SER CALC-MCNC: 2.6 G/DL (ref 2–4)
GLUCOSE BLD STRIP.AUTO-MCNC: 100 MG/DL (ref 65–117)
GLUCOSE BLD STRIP.AUTO-MCNC: 102 MG/DL (ref 65–117)
GLUCOSE BLD STRIP.AUTO-MCNC: 111 MG/DL (ref 65–117)
GLUCOSE BLD STRIP.AUTO-MCNC: 81 MG/DL (ref 65–117)
GLUCOSE BLD STRIP.AUTO-MCNC: 99 MG/DL (ref 65–117)
GLUCOSE SERPL-MCNC: 108 MG/DL (ref 65–100)
GLUCOSE SERPL-MCNC: 109 MG/DL (ref 65–100)
HCT VFR BLD AUTO: 18.1 % (ref 36.6–50.3)
HCT VFR BLD AUTO: 22.3 % (ref 36.6–50.3)
HGB BLD-MCNC: 5.9 G/DL (ref 12.1–17)
HGB BLD-MCNC: 7.2 G/DL (ref 12.1–17)
HGB BLD-MCNC: 7.3 G/DL (ref 12.1–17)
HISTORY CHECKED?,CKHIST: NORMAL
IMM GRANULOCYTES # BLD AUTO: 0.2 K/UL (ref 0–0.04)
IMM GRANULOCYTES NFR BLD AUTO: 1 % (ref 0–0.5)
INR PPP: 1 (ref 0.9–1.2)
LYMPHOCYTES # BLD: 0.9 K/UL (ref 0.8–3.5)
LYMPHOCYTES NFR BLD: 4 % (ref 12–49)
MAGNESIUM SERPL-MCNC: 2.1 MG/DL (ref 1.6–2.4)
MAGNESIUM SERPL-MCNC: 2.2 MG/DL (ref 1.6–2.4)
MCH RBC QN AUTO: 29.1 PG (ref 26–34)
MCHC RBC AUTO-ENTMCNC: 32.6 G/DL (ref 30–36.5)
MCV RBC AUTO: 89.2 FL (ref 80–99)
MONOCYTES # BLD: 1.4 K/UL (ref 0–1)
MONOCYTES NFR BLD: 6 % (ref 5–13)
MORPHOLOGY BLD-IMP: NORMAL
NEUTS SEG # BLD: 20.4 K/UL (ref 1.8–8)
NEUTS SEG NFR BLD: 89 % (ref 32–75)
NRBC # BLD: 0.99 K/UL (ref 0–0.01)
NRBC BLD-RTO: 4.3 PER 100 WBC
PHOSPHATE SERPL-MCNC: 3.2 MG/DL (ref 2.6–4.7)
PHOSPHATE SERPL-MCNC: 3.3 MG/DL (ref 2.6–4.7)
PLATELET # BLD AUTO: 54 K/UL (ref 150–400)
PLATELET # BLD AUTO: 59 X10E3/UL (ref 150–450)
POTASSIUM SERPL-SCNC: 4 MMOL/L (ref 3.5–5.1)
POTASSIUM SERPL-SCNC: 4 MMOL/L (ref 3.5–5.1)
PROT SERPL-MCNC: 4.4 G/DL (ref 6.4–8.2)
PROTHROMBIN TIME: 11.2 SEC (ref 9.1–12)
RBC # BLD AUTO: 2.03 M/UL (ref 4.1–5.7)
RBC MORPH BLD: ABNORMAL
SERVICE CMNT-IMP: NORMAL
SODIUM SERPL-SCNC: 137 MMOL/L (ref 136–145)
SODIUM SERPL-SCNC: 138 MMOL/L (ref 136–145)
WBC # BLD AUTO: 22.9 K/UL (ref 4.1–11.1)

## 2021-12-10 PROCEDURE — C1892 INTRO/SHEATH,FIXED,PEEL-AWAY: HCPCS

## 2021-12-10 PROCEDURE — 90945 DIALYSIS ONE EVALUATION: CPT

## 2021-12-10 PROCEDURE — 83735 ASSAY OF MAGNESIUM: CPT

## 2021-12-10 PROCEDURE — 85025 COMPLETE CBC W/AUTO DIFF WBC: CPT

## 2021-12-10 PROCEDURE — 74011000258 HC RX REV CODE- 258: Performed by: INTERNAL MEDICINE

## 2021-12-10 PROCEDURE — 82962 GLUCOSE BLOOD TEST: CPT

## 2021-12-10 PROCEDURE — C9113 INJ PANTOPRAZOLE SODIUM, VIA: HCPCS | Performed by: INTERNAL MEDICINE

## 2021-12-10 PROCEDURE — 94003 VENT MGMT INPAT SUBQ DAY: CPT

## 2021-12-10 PROCEDURE — 86923 COMPATIBILITY TEST ELECTRIC: CPT

## 2021-12-10 PROCEDURE — 80048 BASIC METABOLIC PNL TOTAL CA: CPT

## 2021-12-10 PROCEDURE — 99222 1ST HOSP IP/OBS MODERATE 55: CPT | Performed by: NURSE PRACTITIONER

## 2021-12-10 PROCEDURE — 80076 HEPATIC FUNCTION PANEL: CPT

## 2021-12-10 PROCEDURE — 74011000250 HC RX REV CODE- 250: Performed by: INTERNAL MEDICINE

## 2021-12-10 PROCEDURE — 2709999900 HC NON-CHARGEABLE SUPPLY

## 2021-12-10 PROCEDURE — 86900 BLOOD TYPING SEROLOGIC ABO: CPT

## 2021-12-10 PROCEDURE — 74011000250 HC RX REV CODE- 250: Performed by: HOSPITALIST

## 2021-12-10 PROCEDURE — 36415 COLL VENOUS BLD VENIPUNCTURE: CPT

## 2021-12-10 PROCEDURE — 84100 ASSAY OF PHOSPHORUS: CPT

## 2021-12-10 PROCEDURE — 86644 CMV ANTIBODY: CPT

## 2021-12-10 PROCEDURE — 36430 TRANSFUSION BLD/BLD COMPNT: CPT

## 2021-12-10 PROCEDURE — 74011250636 HC RX REV CODE- 250/636: Performed by: INTERNAL MEDICINE

## 2021-12-10 PROCEDURE — 85014 HEMATOCRIT: CPT

## 2021-12-10 PROCEDURE — C1769 GUIDE WIRE: HCPCS

## 2021-12-10 PROCEDURE — C1750 CATH, HEMODIALYSIS,LONG-TERM: HCPCS

## 2021-12-10 PROCEDURE — 74018 RADEX ABDOMEN 1 VIEW: CPT

## 2021-12-10 PROCEDURE — 36556 INSERT NON-TUNNEL CV CATH: CPT

## 2021-12-10 PROCEDURE — 02HV33Z INSERTION OF INFUSION DEVICE INTO SUPERIOR VENA CAVA, PERCUTANEOUS APPROACH: ICD-10-PCS | Performed by: INTERNAL MEDICINE

## 2021-12-10 PROCEDURE — P9016 RBC LEUKOCYTES REDUCED: HCPCS

## 2021-12-10 PROCEDURE — 65610000006 HC RM INTENSIVE CARE

## 2021-12-10 PROCEDURE — 74011000250 HC RX REV CODE- 250: Performed by: RADIOLOGY

## 2021-12-10 PROCEDURE — 74011250637 HC RX REV CODE- 250/637: Performed by: INTERNAL MEDICINE

## 2021-12-10 RX ORDER — LIDOCAINE HYDROCHLORIDE 20 MG/ML
18 INJECTION, SOLUTION INFILTRATION; PERINEURAL ONCE
Status: COMPLETED | OUTPATIENT
Start: 2021-12-10 | End: 2021-12-10

## 2021-12-10 RX ORDER — SODIUM CHLORIDE 9 MG/ML
250 INJECTION, SOLUTION INTRAVENOUS AS NEEDED
Status: DISCONTINUED | OUTPATIENT
Start: 2021-12-10 | End: 2021-12-11 | Stop reason: ALTCHOICE

## 2021-12-10 RX ADMIN — NOREPINEPHRINE BITARTRATE 7 MCG/MIN: 1 INJECTION, SOLUTION, CONCENTRATE INTRAVENOUS at 22:11

## 2021-12-10 RX ADMIN — SODIUM CHLORIDE 40 MG: 9 INJECTION INTRAMUSCULAR; INTRAVENOUS; SUBCUTANEOUS at 20:10

## 2021-12-10 RX ADMIN — METOCLOPRAMIDE HYDROCHLORIDE 5 MG: 5 INJECTION INTRAMUSCULAR; INTRAVENOUS at 22:15

## 2021-12-10 RX ADMIN — Medication 125 MCG/HR: at 17:17

## 2021-12-10 RX ADMIN — METOCLOPRAMIDE HYDROCHLORIDE 5 MG: 5 INJECTION INTRAMUSCULAR; INTRAVENOUS at 13:25

## 2021-12-10 RX ADMIN — PIPERACILLIN AND TAZOBACTAM 3.38 G: 3; .375 INJECTION, POWDER, LYOPHILIZED, FOR SOLUTION INTRAVENOUS at 10:51

## 2021-12-10 RX ADMIN — Medication 1 AMPULE: at 20:15

## 2021-12-10 RX ADMIN — SODIUM CHLORIDE 40 MG: 9 INJECTION INTRAMUSCULAR; INTRAVENOUS; SUBCUTANEOUS at 08:13

## 2021-12-10 RX ADMIN — Medication 125 MCG/HR: at 05:35

## 2021-12-10 RX ADMIN — LIDOCAINE HYDROCHLORIDE 1 ML: 20 INJECTION, SOLUTION INFILTRATION; PERINEURAL at 19:50

## 2021-12-10 RX ADMIN — PROPOFOL 45 MCG/KG/MIN: 10 INJECTION, EMULSION INTRAVENOUS at 23:18

## 2021-12-10 RX ADMIN — Medication 1 AMPULE: at 08:11

## 2021-12-10 RX ADMIN — PIPERACILLIN AND TAZOBACTAM 3.38 G: 3; .375 INJECTION, POWDER, LYOPHILIZED, FOR SOLUTION INTRAVENOUS at 01:09

## 2021-12-10 RX ADMIN — Medication 10 ML: at 22:15

## 2021-12-10 RX ADMIN — PROPOFOL 30 MCG/KG/MIN: 10 INJECTION, EMULSION INTRAVENOUS at 10:51

## 2021-12-10 RX ADMIN — ASCORBIC ACID, VITAMIN A PALMITATE, CHOLECALCIFEROL, THIAMINE HYDROCHLORIDE, RIBOFLAVIN-5 PHOSPHATE SODIUM, PYRIDOXINE HYDROCHLORIDE, NIACINAMIDE, DEXPANTHENOL, ALPHA-TOCOPHEROL ACETATE, VITAMIN K1, FOLIC ACID, BIOTIN, CYANOCOBALAMIN: 200; 3300; 200; 6; 3.6; 6; 40; 15; 10; 150; 600; 60; 5 INJECTION, SOLUTION INTRAVENOUS at 18:08

## 2021-12-10 RX ADMIN — NOREPINEPHRINE BITARTRATE 7 MCG/MIN: 1 INJECTION, SOLUTION, CONCENTRATE INTRAVENOUS at 03:54

## 2021-12-10 RX ADMIN — CHLORHEXIDINE GLUCONATE 15 ML: 1.2 RINSE ORAL at 08:13

## 2021-12-10 RX ADMIN — CALCIUM CHLORIDE, MAGNESIUM CHLORIDE, DEXTROSE MONOHYDRATE, LACTIC ACID, SODIUM CHLORIDE, SODIUM BICARBONATE AND POTASSIUM CHLORIDE: 5.15; 2.03; 22; 5.4; 6.46; 3.09; .157 INJECTION INTRAVENOUS at 23:22

## 2021-12-10 RX ADMIN — PIPERACILLIN AND TAZOBACTAM 3.38 G: 3; .375 INJECTION, POWDER, LYOPHILIZED, FOR SOLUTION INTRAVENOUS at 18:00

## 2021-12-10 RX ADMIN — CHLORHEXIDINE GLUCONATE 15 ML: 1.2 RINSE ORAL at 20:10

## 2021-12-10 RX ADMIN — Medication 10 ML: at 13:28

## 2021-12-10 RX ADMIN — Medication 10 ML: at 05:27

## 2021-12-10 RX ADMIN — PROPOFOL 45 MCG/KG/MIN: 10 INJECTION, EMULSION INTRAVENOUS at 04:42

## 2021-12-10 RX ADMIN — METOCLOPRAMIDE HYDROCHLORIDE 5 MG: 5 INJECTION INTRAMUSCULAR; INTRAVENOUS at 05:19

## 2021-12-10 RX ADMIN — Medication 10 ML: at 05:23

## 2021-12-10 RX ADMIN — CALCIUM CHLORIDE, MAGNESIUM CHLORIDE, DEXTROSE MONOHYDRATE, LACTIC ACID, SODIUM CHLORIDE, SODIUM BICARBONATE AND POTASSIUM CHLORIDE: 5.15; 2.03; 22; 5.4; 6.46; 3.09; .157 INJECTION INTRAVENOUS at 23:26

## 2021-12-10 NOTE — DIALYSIS
[facility] / [phone]           Orders   Mode: CVVH restarted @ 0680   Blood Flow Rate: 200 ml/min    Dialysate Flow Rate: 20 ml/kg/hr x 65 kg = 1,300 ml/hr  PBP: 650 ml/hr  Dialysate: 650 ml/hr  **100% Pre-filter   Prismasol Concentrate: 2K / 3.5Ca   Blood Warmer Temp: 37*C   Net Fluid Removal: Add as tolerated            Metrics   BP: 102/51   HR: 96   Access Pressure: -109   Filter Pressure: 116   Return Pressure: 63   TMP: 22   Pressure Drop: 33            Access   Type & Location: Left femoral temp. CVC, tegaderm dressing with biopatch C/D/I, dated 12/9/21. Each catheter limb disinfected for 60 seconds per limb with alcohol swabs and hubs scrubbed with Prevantics for 15 seconds, followed by a 5 second dry time per Hospital P&P. +asp/+flush x 2 ports, both ports sluggish.             Labs   HBsAg (Antigen) / date: Negative 12/1/21                HBsAb (Antibody) / date: Immune 11/11/21   Source: [facility]            Safety:   Time Out Done:    0040   Consent obtained/signed: Verified   Education: Pt intubated/sedated   Primary Nurse Rpt Pre: Beryle Caro, RN   Primary Nurse Rpt Post: Beryle Caro, RN      Comments / Plan:       Patient, code status, labs, and orders verified. Consents on chart. Time out completed. Pt COVID-19 +; All appropriate PPE worn by dialysis RN during care. In at bedside to restart CVVH due to \"clotted filter\", primary RN returned all possible blood 165 mls. Run time 7 hrs. Old set discarded in biohazard bin. New HF-1000 filter set-up, primed w/ 1L NS, tested and running well. Lines visible and connections secure with blood warmer supported on return line, set at 37*C. Education, pre and post report to primary RN.

## 2021-12-10 NOTE — DIALYSIS
Angie Clark       Comments / Plan:       CVVH on hold due to nonfunctional CVC, primary RN returned all blood due to unresolved alarms 15 mins into treatment. Pt needs functional catheter to resume CVVH tx. Contacted TOMÁS Jay to report issues & pt status updates. NP will defer to rounding nephrologist this am for further orders. Education, pre and post report to JOSE Hennessy, primary RN.

## 2021-12-10 NOTE — PROGRESS NOTES
Nephrology Progress Note  Blaise Leblanc     [facility]. [email or web address]  Phone - [phone]   Patient: Letty Main    YOB: 1975        Date- 12/10/2021   Admit Date: 11/27/2021  CC: Follow up for  ESRD        IMPRESSION & PLAN:   · ESRD-- home HD 5 days per week- Follows up with Dr Rhonda Hu at [facility] ( now [facility] HD for IV antibiotics.)  · Covid 19 infection  · Hemorrhagic shock  · Acute blood loss anemia from esophageal bleed from esophagitis  · hyperkalemia  · Left arm swelling- s/p angioplasty of left subclavian vein  · Gram positive sepsis from dental abcess  · s/p lead extraction at [facility]  · Anemia of ckd  · Hx of renal tx in past times 2.  · Hypertension  · CAD  · Failed RTX times 2         H/o DVT, s/p ivc filter/ h/o HIT      PLAN-   Dialysis catheter not working well.  Consulted IR for placement of a new catheter.  Restart CRRT at the 2K bath.  Try to keep the factor of 0 for now.  Will need packed RBC transfusion   Discussed with critical care medicine     Subjective: Interval History:   He is on vent  Hb low this a.m. EGD results noted  bp low requiring vasopressors  CRRT off because of line malfunction      Objective:   Vitals:    12/10/21 0803 12/10/21 0826 12/10/21 0900 12/10/21 1000   BP:   106/63 119/71   Pulse: 97 83 82 84   Resp: (!) 35 17 18 18   Temp:       TempSrc:       SpO2: 95% 100% 100% 100%   Weight:       Height:          12/09 0701 - 12/10 0700  In: 1057.1 [I.V.:1057.1]  Out: 1801   Last 3 Recorded Weights in this Encounter    12/07/21 2235 12/09/21 0000 12/10/21 0530   Weight: 71.7 kg (158 lb 1.1 oz) 72.2 kg (159 lb 2.8 oz) 68.2 kg (150 lb 5.7 oz)      Physical exam:   GEN: on vent  NECK- ET tube +  RESP: no distress  NEURO:Can't access due to patient's current condition         Chart reviewed. Pertinent Notes reviewed.      Data Review :  Recent Labs     12/10/21  0346 12/09/21  1558 12/09/21  0410    138 137   K 4.0 4.1 4.5    104 103   CO2 24 27 26   BUN 50* 51* 55*   CREA 3.70* 3.78* 3.81*   * 80 94   CA 7.4* 7.8* 7.9*   MG 2.2 1.9 2.2   PHOS 3.2 3.3 4.2     Recent Labs     12/10/21  0915 12/10/21  0346 12/09/21  0410 12/08/21  1448 12/08/21  1448   WBC  --  22.9* 29.2*  --  22.8*   HGB 7.3* 5.9* 7.4*   < > 6.5*   HCT 22.3* 18.1* 21.6*   < > 19.1*   PLT  --  54* 59*  62*  --  67*    < > = values in this interval not displayed. No results for input(s): FE, TIBC, PSAT, FERR in the last 72 hours.    Medication list  reviewed  Current Facility-Administered Medications   Medication Dose Route Frequency    0.9% sodium chloride infusion 250 mL  250 mL IntraVENous PRN    epoetin emilie-epbx (RETACRIT) injection 20,000 Units  20,000 Units SubCUTAneous Q TUE, THU & SAT    glucagon (GLUCAGEN) injection 1 mg  1 mg IntraMUSCular PRN    insulin lispro (HUMALOG) injection   SubCUTAneous Q6H    dextrose (D50W) injection syrg 12.5-25 g  12.5-25 g IntraVENous PRN    TPN ADULT - CENTRAL AA 5% D15% W/ ELECTROLYTES AND CA   IntraVENous CONTINUOUS    NOREPINephrine (LEVOPHED) 8 mg in 5% dextrose 250mL (32 mcg/mL) infusion  0.5-30 mcg/min IntraVENous TITRATE    fentaNYL citrate (PF) injection 25 mcg  25 mcg IntraVENous Q2H PRN    sodium chloride (NS) flush 5-40 mL  5-40 mL IntraVENous Q8H    sodium chloride (NS) flush 5-40 mL  5-40 mL IntraVENous PRN    simethicone (MYLICON) 75VS/5.8TL oral drops 80 mg  1.2 mL Oral Multiple    0.9% sodium chloride infusion 250 mL  250 mL IntraVENous PRN    [Held by provider] calcium acetate(phosphat bind) (PHOSLO) capsule 1,334 mg  2 Capsule Per NG tube TID WITH MEALS    metoclopramide HCl (REGLAN) injection 5 mg  5 mg IntraVENous Q8H    bicarbonate dialysis (PRISMASOL) BG K 2/Ca 3.5 5000 ml solution   Extracorporeal DIALYSIS CONTINUOUS    albuterol-ipratropium (DUO-NEB) 2.5 MG-0.5 MG/3 ML  3 mL Nebulization Q4H PRN    piperacillin-tazobactam (ZOSYN) 3.375 g in 0.9% sodium chloride (MBP/ADV) 100 mL MBP  3.375 g IntraVENous Q8H    pantoprazole (PROTONIX) 40 mg in 0.9% sodium chloride 10 mL injection  40 mg IntraVENous Q12H    Warfarin- Hold until bleeding has resolved   Other Rx Dosing/Monitoring    albumin human 25% (BUMINATE) solution 25 g  25 g IntraVENous DIALYSIS PRN    propofol (DIPRIVAN) 10 mg/mL infusion  0-50 mcg/kg/min IntraVENous TITRATE    fentaNYL (PF) 1,500 mcg/30 mL (50 mcg/mL) infusion  0-200 mcg/hr IntraVENous TITRATE    alcohol 62% (NOZIN) nasal  1 Ampule  1 Ampule Topical Q12H    chlorhexidine (PERIDEX) 0.12 % mouthwash 15 mL  15 mL Oral Q12H    sodium chloride (NS) flush 5-10 mL  5-10 mL IntraVENous PRN    acetaminophen (TYLENOL) tablet 650 mg  650 mg Oral Q6H PRN    [Held by provider] L.acidophilus-paracasei-S.thermophil-bifidobacter (RISAQUAD) 8 billion cell capsule  1 Capsule Oral DAILY    sodium chloride (NS) flush 5-40 mL  5-40 mL IntraVENous Q8H    sodium chloride (NS) flush 5-40 mL  5-40 mL IntraVENous PRN    polyethylene glycol (MIRALAX) packet 17 g  17 g Oral DAILY PRN    ondansetron (ZOFRAN) injection 4 mg  4 mg IntraVENous Q6H PRN          Lyndsey Dy, [id number] [facility]  [facility] [place] AND [facility], [address]  [facility], [address] [place]  Phone - [phone]               Fax - [phone]

## 2021-12-10 NOTE — PROGRESS NOTES
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 He was admitted to Hospitalist Service with Acute COVID infection, mild hypoxemic respiratory failure, recent strep intermedius bacteremia. Transferred to ICU  after developing hematemesis and shock. PRINCIPLE ICU DIAGNOSIS:  Hemorrhagic shock due to UGIB    Hospital course/major results:   Admission as above   Transferred to ICU for hematemesis, shock. Intubated semi-electively for EGD. Required multiple units RBC, FFP, plts. EGD revealed small distal esophageal ulcers and large volume of blood and clots in stomach without clear source of bleeding identified. Required vasopressors. Continued blood loss into night requiring more blood products.  CTAP: 1. There is a large amount of hemorrhage within a distended stomach, extending into the duodenum. An active bleed is not seen on this examination. However, delayed images were not performed, somewhat limiting the study. 2. Bilateral pleural effusions with bilateral pneumonia.  L femoral HD catheter palced and CRRT initiated   Remains intubated and on CRRT. Further bleeding with Hgb 6.5. Received one unit RBCs. Repeat EGD: Severe distal esophageal ulceration which is likely cause of bleeding, 2. Stomach full of blood---mucosa not well seen 3. Duodenum is normal. Worsening abdominal distention. Repeat CTAP: Generalized gaseous distention of large and small bowel. Findings are consistent with a generalized ileus. 2. No evidence of bowel perforation. 3. Continued gastric distention with hemorrhagic material, similar to prior study. 4. NG tube in the stomach.  5. Covid 19 pneumonia has worsened in the visualized lung bases. 6. Continued small to moderate bilateral pleural effusions. 12/08 General Surgery consultation  12/08 Palliative Care consultation requested  12/09 Stabilizing. Vasopressor requirements improving. Abdomen exam much improved - softer, less distended. TPN initiated  12/10: Patient remains intubated and sedated, on minimal vent settings. Still has ongoing melena. Transfused one U of blood this morning. Dialysis catheter clotted overnight. Lines/tubes/devices:  ETT 12/06 >>   R femoral CVL 12/06 >>   L femoral HD catheter 12/07 >>       COMPREHENSIVE CCM ASSESSMENT/PLAN (by systems)       PULMONARY:  1. Vent dep respiratory failure - intubated for EGD  2. B pulmonary infiltrates - likely aspiration    Current vent settings: PRVC 400/15/5/40%      PLAN   - Cont current ventilator support. Settings reviewed with RT and adjusted as indicated  - Daily SBT when daily screening criteria met      CARDIOVASCULAR/HEMODYNAMIC:  3. Shock - hemorrhagic     Current vasopressors: NE 7 mcg/min      PLAN  - cardiac/hemodynamic monitoring  - MAP goal > 60 mmHg  - SBP goal > 100 mmHg      RENAL:  4. ESRD  5. Mild metabolic acidosis  6. Mild hyperkalemia    Current RRT:     PLAN  - Monitor chemistry panel daily  - Correct electrolytes as indicated  - Nephrology following  - CRRT per Nephrology - initiated 12/07    GI/NUTRITION:  7. Acute UGIB  8. Distal esophageal ulceration  9. Protein-calorie malnutrition      Current nutritional support: none  SUP: high dose IV PPI    PLAN  - Cont high dose PPI  - Gastroenterology following - their assistance appreciated  - General Surgery consultation 12/08 - no indication for surgery  - Initiate TPN 12/09      INFECTIOUS DISEASE  10. Suspect aspiration PNA  11.  Severe sepsis    Micro:  Resp 12/07 >>    Antibiotics:  Pip-tazo 12/07 >>     PLAN  - Follow culture results to completion  - Duration of antimicrobial therapy TBD  - Track PCT    HEME  12. ABLA  13. Thrombocytopenia  14. H/O DVT on chronic warfarin    DVT prophylaxis: SCDs - has IVC filter    PLAN  - Daily CBC  - Transfuse as needed to maintain Hgb > 7.0 gm/dL or for hemodynamically significant bleeding  - Transfuse plts to maintain > 50k  - Transfuse FFP to maintain INR < 1.5  - Holding warfarin    NEURO  15. Cognition fully intact prior to admission  16. ICU/vent associated discomfort      RASS goal: -1, -2  Current sedatives: propofol  Current analgesics: fentanyl    PLAN   - Daily SAT when meets ICU criteria  - ABCDEF mobility bundle  - PT/OT involvement when appropriate      ENDOCRINE  17. Mild hyperglycemia without prior dx of DM    PLAN  - Target glucose: 100-180  - Glycemic control: N/I presently    GOALS OF CARE/ADVANCED DIRECTIVES  18. CODE STATUS: Full   19. HPI/Consult/Subjective:   24 Hr Events 12/10/2021:   As above    SUBJ:   RASS -2, + F/C, synchronous with vent      Past Medical History:      has a past medical history of Abdominal hematoma, AICD (automatic cardioverter/defibrillator) present, CAD (coronary artery disease), Chronic abdominal pain, Chronic kidney disease, DVT (deep venous thrombosis) ([address] (2001), DVT of popliteal vein ([address] (11/2011), Endocarditis, Gallstone pancreatitis, Gastrointestinal disorder, Gastrointestinal disorder, Hemodialysis patient ([address] High cholesterol, Hypertension, Kidney transplant, Long term current use of anticoagulant therapy, Nephrotic syndrome, Other ill-defined conditions(799.89), Other ill-defined conditions(799.89), Other ill-defined conditions(799.89), Peritonitis ([address] Seizures ([address] (2015), Small bowel obstruction ([address] 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[address] and V-tach ([address]     Past Surgical History:      has a past surgical history that includes pr edg us exam surgical alter stom duodenum/jejunum (7/15/2011); pr esophagogastroduodenoscopy transoral diagnostic (5/24/2012); hx cholecystectomy; hx appendectomy; hx small bowel resection; hx other surgical (11/2011); hx other surgical; hx other surgical (02/2013); hx transplant (2001 ); hx transplant (1992); vascular surgery procedure unlist (11/14/2017); hx pacemaker (Left, 6/2009); hx pacemaker (Left); pr cardiac surg procedure unlist (2007); hx vascular access (Right); ir replace cvc tunneled w/o port (9/10/2020); ir remove tunl cvad w/o port / pump (10/29/2020); upper gi endoscopy,ctrl bleed (12/6/2021); ir insert non tunl cvc over 5 yrs (12/7/2021); and upper gi endoscopy,diagnosis (12/8/2021). Home Medications:     Prior to Admission medications    Medication Sig Start Date End Date Taking? Authorizing Provider   ondansetron (Zofran ODT) 4 mg disintegrating tablet 1 Tab by SubLINGual route every eight (8) hours as needed for Nausea or Vomiting. 3/27/21   Rehan Franco MD   metoprolol succinate (TOPROL-XL) 25 mg XL tablet Take 0.5 Tabs by mouth daily. 11/10/20   Shantell Rosenthal MD   calcium acetate,phosphat bind, (PHOSLO) 667 mg cap Take 2 Caps by mouth three (3) times daily (with meals). Indications: low amount of calcium in the blood, renal osteodystrophy with hyperphosphatemia 11/10/20   Shantell Rosenthal MD   atorvastatin (Lipitor) 20 mg tablet Take 20 mg by mouth daily. Provider, Historical   warfarin (COUMADIN) 2.5 mg tablet Take 2.5 mg by mouth daily. Provider, Historical   acetaminophen (TYLENOL) 500 mg tablet Take 1 Tab by mouth every four (4) hours as needed for Pain. Over the counter 1/20/19   Bright Starks MD   omeprazole (PRILOSEC) 20 mg capsule Take 20 mg by mouth daily. Provider, Historical   calcitRIOL (ROCALTROL) 0.5 mcg capsule Take 0.5 mcg by mouth daily. Provider, Historical   aspirin 81 mg chewable tablet Take 81 mg by mouth daily. Other, MD Morro       Allergies/Social/Family History:      Allergies   Allergen Reactions    Shellfish Containing Products Swelling     Break out in rash, trouble breathing    Contrast Agent [Iodine] Shortness of Breath    Heparin Analogues Other (comments)     Positive history of HIT      Social History     Tobacco Use    Smoking status: Never Smoker    Smokeless tobacco: Never Used   Substance Use Topics    Alcohol use: No      Family History   Problem Relation Age of Onset    COPD Mother     Lung Disease Mother     Cancer Father         stomach    Cancer Maternal Grandmother            Objective:   Vital Signs:  Visit Vitals  BP (!) 103/59 (BP 1 Location: Right upper arm, BP Patient Position: At rest)   Pulse 83   Temp 99 °F (37.2 °C)   Resp 17   Ht 5' 7\" (1.702 m)   Wt 68.2 kg (150 lb 5.7 oz)   SpO2 100%   BMI 23.55 kg/m²    O2 Flow Rate (L/min): 2 l/min O2 Device: Endotracheal tube, Ventilator Temp (24hrs), Av.3 °F (36.8 °C), Min:97.7 °F (36.5 °C), Max:99 °F (37.2 °C)           Intake/Output:     Intake/Output Summary (Last 24 hours) at 12/10/2021 0835  Last data filed at 12/10/2021 0800  Gross per 24 hour   Intake 1207.41 ml   Output 1779 ml   Net -571.59 ml       Physical Exam:  GEN: chronically ill-appearing, RASS -2, + F/C, synchronous with vent  HEENT: NCAT, sclerae white  NECK: No JVD noted  CHEST: Clear to auscultation and percussion  CARDIAC: sinus rhythm, regular, no murmur noted  ABD: les distended, softer, + tympani - less prominent, hypoactive BS  EXT: Symmetric LE edema  NEURO: Cranial nerves intact, symmetric strength, no focal deficits noted  DERM: No lesions noted    I have examined the patient on this day 12/10/2021 and the above documented exam is accurate including the components that have been copied forward    LABS AND  DATA: Personally reviewed  Recent Labs     12/10/21  0346 21  0410   WBC 22.9* 29.2*   HGB 5.9* 7.4*   HCT 18.1* 21.6*   PLT 54* 59*  62*     Recent Labs     12/10/21  0346 21  1558    138   K 4.0 4.1    104   CO2 24 27   BUN 50* 51*   CREA 3.70* 3.78*   * 80   CA 7.4* 7.8*   MG 2.2 1.9   PHOS 3.2 3.3     Recent Labs     12/10/21  0344 12/09/21 0410   * 121*   TP 4.4* 5.2*   ALB 1.8* 2.1*   GLOB 2.6 3.1     Recent Labs     12/09/21 0410 12/08/21 0418   INR 1.0  1.1 1.1  1.2*   PTP 11.2  11.9* 11.4  12.3*   APTT 31 36*      No results for input(s): PHI, PCO2I, PO2I, FIO2I in the last 72 hours. No results for input(s): CPK, CKMB, TROIQ, BNPP in the last 72 hours. Hemodynamics:   PAP:   CO:     Wedge:   CI:     CVP:    SVR:       PVR:       Ventilator Settings:  Mode Rate Tidal Volume Pressure FiO2 PEEP   PRVC   400 ml  12 cm H2O 40 % 5 cm H20     Peak airway pressure: 17 cm H2O    Minute ventilation: 8.3 l/min        MEDS: Reviewed    Chest X-Ray:  CXR Results  (Last 48 hours)               12/09/21 0514  XR CHEST PORT Final result    Impression:  Unchanged patchy bilateral airspace disease. Narrative:  INDICATION: Respiratory failure. Ventilator dependent. COMPARISON: 12/8/2021       FINDINGS: Single AP portable view of the chest obtained at 4:37 AM demonstrates   no change in position of the endotracheal tube. Nasogastric tube extends into   the upper abdomen. The cardiomediastinal silhouette is stable. Patchy bilateral   airspace disease is not significantly changed. No pneumothorax is seen. There is   no evidence of pleural effusion. 12/08/21 1513  XR CHEST PORT Final result    Impression:  NG tube is coiled in the patient's throat, and terminates in the mid/distal   thoracic esophagus. Recommend repositioning. Narrative:  INDICATION:    OGT placement        EXAMINATION:  AP CHEST, PORTABLE       COMPARISON: 12/7/2021       FINDINGS: Single AP portable view of the chest at 1456 hours demonstrates an NG   tube which appears coiled in the patient's mouth, and its tip is in the location   of the mid/distal thoracic esophagus, approximately 17 mm superior to the   gastric air bubble in the left midabdomen. ET tube is not significantly changed. Covid 19 pneumonia is again noted.  The cardiomediastinal silhouette is stable. There is no new airspace disease. I have reviewed the above films and agree with official interpretation         Multidisciplinary Rounds Completed:  Yes      SPECIAL EQUIPMENT  CRRT    DISPOSITION  Stay in ICU    CRITICAL CARE CONSULTANT NOTE  I had a in-person encounter with Letty Main, reviewed and interpreted patient data including events, labs, images, vital signs, I/O's, and examined patient. I have discussed the case and the plan and management of the patient's care with the consulting services, the bedside nurses and the respiratory therapist.      NOTE OF PERSONAL INVOLVEMENT IN CARE   This patient is at high risk for sudden and clinically significant deterioration, which requires the highest level of preparedness to intervene urgently. I participated in the decision-making and personally managed or directed the management of the following life and organ supporting interventions that required my frequent assessment to treat or prevent imminent deterioration. I personally spent 40 minutes of critical care time. This is time spent at patient's bedside actively involved in patient care as well as the coordination of care and discussions with the patient's family. This does not include any procedural time which has been billed separately.     Ofelia Taylor MD  Pulmonary/[facility]  [phone]  12/10/2021

## 2021-12-10 NOTE — CONSULTS
Palliative Medicine Consult  Albert: [phone] ([phone])    Patient Name: Ashia Gama  YOB: 1975    Date of Initial Consult:  12/10/21  Reason for Consult:  Care Decisions  Requesting Provider: Dr. Natalee Asencio    Primary Care Physician: Arlyn Hernandez MD     SUMMARY:   Ashia Gama is a 42 y. o. with a past history of seizures, kidney transplant X2 now on HD, HTN, HLD, GERD, AICD with recent bacteremia, endocarditis, hx of DVT on warfarin who was admitted on 11/27/2021 from home with a diagnosis weakness . Current medical issues leading to Palliative Medicine involvement include: care decisions given his acute on chronic conditions with end stage renal disease complicated by COVID and GI bleed. Of note, pt now in ICU d/t  Hematemsis and shock on 12/6, he was intubated prior to first EGD and required multiple transfusions and blood products since that. Started on CRRT on 12/7, remained intubated and sedated with worsening COVID pneumonia. Pt condition complicated even further by his dialysis access clotting off. Plan for 12/10 is to have new dialysis line placed. Pt lives with his sister, Shaheen Castillo, and the two are very close. She donated her kidney to him back in 2001. She stresses that pt is very strong and handles illness a lot better than average people. He has been dealing with medical conditions since he was 14 years. He has always handled every illness well and come out stronger his sister said. He is very independent at baseline and was doing his own HD at home up until a month ago. He was going clinic for planned 4 weeks of abx with HD d/t recent infection. Shaheen Castillo brought him to hospital instead of HD on the day he was admitted d/t increasing weakness in his legs and he knew something was off with his body. 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 Shaheen Castillo expresses that she is louise to have him as a brother and is here to support him in any way possible. PALLIATIVE DIAGNOSES:   1.  Renal failure 2. Goals of care  3. DNR discussion   4. Acute respiratory failure  5. Emotional support        PLAN:   1. Met with pt's sister, Tommy Luna, to discuss goals of care and POC for her brother. She said the two of them have discussed what he would want in these situations and he wanted everything done to keep him alive and fighting. As mentioned above, despite his very extensive medical history list, he has always had a very high quality of life and Garrel Going insists she will fight for him to keep living per his wishes and their previous conversations. We had lengthy discussion about pt condition and what his status was. She verbalized understanding ventilator necessity and medical management of his blood clots/gastiritis. Jammie was asking about re-testing for COVID to see if he would test negative, my understanding was so she could visit him. Updated on protocols for COVID testing/vistation and appreciate Dr. Denia Farias explaining why re-testing would not change our treatment or precautions at this time. COVID test was positive on 11/27. 3. Offered emotional support and Brigette verbalized understanding of what was going on. She appears very well versed in medical terminology and has been right along side him in managing his complex medical conditions. 4. Plan for now, continue with FULL interventions. Gato Cos our support if she needs anything to contact us. We will follow peripherally for now, please call if goals need to be readdressed or pt condition changes. 5. Initial consult note routed to primary continuity provider and/or primary health care team members  6.  Communicated plan of care with: Palliative Ranjit STEIN [facility] Team     GOALS OF CARE / TREATMENT PREFERENCES:     TREATMENT PREFERENCES:   Code Status: Full Code    Patient and family's personal goals include:  Let him fight for his life      Advance Care Planning:  [] [facility] Interdisciplinary Team has updated the ACP Navigator with Health Care Decision Maker and Patient Capacity      Primary Decision Maker: Malinda Langley - [phone]  Advance Care Planning 11/28/2021   Patient's Healthcare Decision Maker is: Legal Next of Kin   Primary Decision Maker Name -   Primary Decision Maker Phone Number -   Primary Decision Maker Relationship to Patient -   Confirm Advance Directive None   Patient Would Like to Complete Advance Directive -       Medical Interventions: Full interventions       Other:    As far as possible, the palliative care team has discussed with patient / health care proxy about goals of care / treatment preferences for patient. HISTORY:     History obtained from:  Chart and LNOK  CHIEF COMPLAINT:  N/A    HPI/SUBJECTIVE:    The patient is:   [] Verbal and participatory  [x] Non-participatory due to:   Pt condition     Clinical Pain Assessment (nonverbal scale for severity on nonverbal patients):   Clinical Pain Assessment  Severity: 0     Activity (Movement): Lying quietly, normal position    Duration: for how long has pt been experiencing pain (e.g., 2 days, 1 month, years)  Frequency: how often pain is an issue (e.g., several times per day, once every few days, constant)     FUNCTIONAL ASSESSMENT:     Palliative Performance Scale (PPS):  PPS: 20       PSYCHOSOCIAL/SPIRITUAL SCREENING:     Palliative IDT has assessed this patient for cultural preferences / practices and a referral made as appropriate to needs (Cultural Services, Patient Advocacy, Ethics, etc.)    Any spiritual / Sabianism concerns:  [] Yes /  [x] No    Caregiver Burnout:  [] Yes /  [x] No /  [] No Caregiver Present      Anticipatory grief assessment:   [x] Normal  / [] Maladaptive       ESAS Anxiety: Anxiety: 0    ESAS Depression: Depression: 0        REVIEW OF SYSTEMS:     Positive and pertinent negative findings in ROS are noted above in HPI.   The following systems were [] reviewed / [x] unable to be reviewed as noted in HPI  Other findings are noted below.  Systems: constitutional, ears/nose/mouth/throat, respiratory, gastrointestinal, genitourinary, musculoskeletal, integumentary, neurologic, psychiatric, endocrine. Positive findings noted below. Modified ESAS Completed by: provider   Fatigue: 0 Drowsiness: 0   Depression: 0 Pain: 0 (sedated)   Anxiety: 0 Nausea: 0   Anorexia: 0 Dyspnea: 0           Stool Occurrence(s): 1        PHYSICAL EXAM:     From RN flowsheet:  Wt Readings from Last 3 Encounters:   12/10/21 150 lb 5.7 oz (68.2 kg)   11/17/21 145 lb 12.8 oz (66.1 kg)   04/07/21 147 lb 4.3 oz (66.8 kg)     Blood pressure 114/64, pulse 85, temperature 98.9 °F (37.2 °C), resp. rate 21, height 5' 7\" (1.702 m), weight 150 lb 5.7 oz (68.2 kg), SpO2 97 %. Pain Scale 1: Behavioral Pain Scale (BPS)  Pain Intensity 1: 3  Pain Onset 1: chronic  Pain Location 1: Abdomen  Pain Orientation 1: Anterior  Pain Description 1:  (unable to describe, intubated)  Pain Intervention(s) 1: Medication (see MAR)  Last bowel movement, if known:     Constitutional: NAD  ENMT: no nasal discharge, moist mucous membranes  Cardiovascular: regular rhythm, distal pulses intact  Respiratory: breathing not labored, symmetric  Musculoskeletal: KATY           HISTORY:     Active Problems:    Acute COVID-19 (11/27/2021)      Past Medical History:   Diagnosis Date    Abdominal hematoma     AICD (automatic cardioverter/defibrillator) present     CAD (coronary artery disease)     anterior MI s/p 2 stents  2007    Chronic abdominal pain     Chronic kidney disease     ESRD; Dialysis dependent.  Home [facility] does labs- [facility] tpke    DVT (deep venous thrombosis) ([facility] [address] 2001    DVT of popliteal vein ([facility] [address] 11/2011    not anticoagulated due to bleeding, IVC filter    Endocarditis     Gallstone pancreatitis     Gastrointestinal disorder     acid reflux    Gastrointestinal disorder     peptic ulcer    Hemodialysis patient ([facility] [address]     High cholesterol     Hypertension     Kidney transplant     b/l kidneys 1995, 2001    Long term current use of anticoagulant therapy     Nephrotic syndrome     Other ill-defined conditions(799.89)     kidny transplant x2,  on dialysis    Other ill-defined conditions(799.89)     home dialysis    Other ill-defined conditions(799.89)     hx recurrent left leg DVT    Peritonitis ([facility] [address]     Seizures ([facility] [address] 2015    most recent- only had three in lifetime    Small bowel obstruction (HCC)     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[facility] [address]     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    V-tach [facility])       Past Surgical History:   Procedure Laterality Date    HX APPENDECTOMY      HX CHOLECYSTECTOMY      HX OTHER SURGICAL  11/2011    exploratory laparotomy    HX OTHER SURGICAL      fem-pop    HX OTHER SURGICAL  02/2013    right upper extremity fistula    HX PACEMAKER Left 6/2009    AICD-st latonya-not connected    HX PACEMAKER Left     AICD-left side-BS-working    HX SMALL BOWEL RESECTION      HX TRANSPLANT  2001     Kidney    HX TRANSPLANT  1992    kidney    HX VASCULAR ACCESS Right     femoral access for HD- does 5 day dialysis @ home    IR INSERT NON TUNL CVC OVER 5 YRS  12/7/2021    IR REMOVE TUNL CVAD W/O PORT / PUMP  10/29/2020    IR REPLACE CVC TUNNELED W/O PORT  9/10/2020    MA CARDIAC SURG PROCEDURE UNLIST  2007    2 stents    MA EDG US EXAM SURGICAL ALTER STOM DUODENUM/JEJUNUM  7/15/2011         MA ESOPHAGOGASTRODUODENOSCOPY TRANSORAL DIAGNOSTIC  5/24/2012         UPPER GI ENDOSCOPY,CTRL BLEED  12/6/2021         UPPER GI ENDOSCOPY,DIAGNOSIS  12/8/2021         VASCULAR SURGERY PROCEDURE UNLIST  11/14/2017    Insertion AV graft right upper arm (bovine); ligation of AV fistula right arm      Family History   Problem Relation Age of Onset    COPD Mother     Lung Disease Mother     Cancer Father         stomach    Cancer Maternal Grandmother       History reviewed, no pertinent family history.   Social History     Tobacco Use    Smoking status: Never Smoker    Smokeless tobacco: Never Used   Substance Use Topics    Alcohol use: No     Allergies   Allergen Reactions    Shellfish Containing Products Swelling     Break out in rash, trouble breathing    Contrast Agent [Iodine] Shortness of Breath    Heparin Analogues Other (comments)     Positive history of HIT      Current Facility-Administered Medications   Medication Dose Route Frequency    0.9% sodium chloride infusion 250 mL  250 mL IntraVENous PRN    TPN ADULT - CENTRAL AA 5% D15% W/ ELECTROLYTES AND CA   IntraVENous CONTINUOUS    epoetin emilie-epbx (RETACRIT) injection 20,000 Units  20,000 Units SubCUTAneous Q TUE, THU & SAT    glucagon (GLUCAGEN) injection 1 mg  1 mg IntraMUSCular PRN    insulin lispro (HUMALOG) injection   SubCUTAneous Q6H    dextrose (D50W) injection syrg 12.5-25 g  12.5-25 g IntraVENous PRN    TPN ADULT - CENTRAL AA 5% D15% W/ ELECTROLYTES AND CA   IntraVENous CONTINUOUS    NOREPINephrine (LEVOPHED) 8 mg in 5% dextrose 250mL (32 mcg/mL) infusion  0.5-30 mcg/min IntraVENous TITRATE    fentaNYL citrate (PF) injection 25 mcg  25 mcg IntraVENous Q2H PRN    simethicone (MYLICON) 38TP/6.6DW oral drops 80 mg  1.2 mL Oral Multiple    [Held by provider] calcium acetate(phosphat bind) (PHOSLO) capsule 1,334 mg  2 Capsule Per NG tube TID WITH MEALS    metoclopramide HCl (REGLAN) injection 5 mg  5 mg IntraVENous Q8H    bicarbonate dialysis (PRISMASOL) BG K 2/Ca 3.5 5000 ml solution   Extracorporeal DIALYSIS CONTINUOUS    albuterol-ipratropium (DUO-NEB) 2.5 MG-0.5 MG/3 ML  3 mL Nebulization Q4H PRN    piperacillin-tazobactam (ZOSYN) 3.375 g in 0.9% sodium chloride (MBP/ADV) 100 mL MBP  3.375 g IntraVENous Q8H    pantoprazole (PROTONIX) 40 mg in 0.9% sodium chloride 10 mL injection  40 mg IntraVENous Q12H    Warfarin- Hold until bleeding has resolved   Other Rx Dosing/Monitoring    albumin human 25% (BUMINATE) solution 25 g  25 g IntraVENous DIALYSIS PRN    propofol (DIPRIVAN) 10 mg/mL infusion  0-50 mcg/kg/min IntraVENous TITRATE    fentaNYL (PF) 1,500 mcg/30 mL (50 mcg/mL) infusion  0-200 mcg/hr IntraVENous TITRATE    alcohol 62% (NOZIN) nasal  1 Ampule  1 Ampule Topical Q12H    chlorhexidine (PERIDEX) 0.12 % mouthwash 15 mL  15 mL Oral Q12H    acetaminophen (TYLENOL) tablet 650 mg  650 mg Oral Q6H PRN    [Held by provider] L.acidophilus-paracasei-S.thermophil-bifidobacter (RISAQUAD) 8 billion cell capsule  1 Capsule Oral DAILY    sodium chloride (NS) flush 5-40 mL  5-40 mL IntraVENous Q8H    sodium chloride (NS) flush 5-40 mL  5-40 mL IntraVENous PRN    polyethylene glycol (MIRALAX) packet 17 g  17 g Oral DAILY PRN    ondansetron (ZOFRAN) injection 4 mg  4 mg IntraVENous Q6H PRN          LAB AND IMAGING FINDINGS:     Lab Results   Component Value Date/Time    WBC 22.9 (H) 12/10/2021 03:46 AM    HGB 7.3 (L) 12/10/2021 09:15 AM    PLATELET 54 (L) 15/40/9201 03:46 AM     Lab Results   Component Value Date/Time    Sodium 138 12/10/2021 03:46 AM    Potassium 4.0 12/10/2021 03:46 AM    Chloride 105 12/10/2021 03:46 AM    CO2 24 12/10/2021 03:46 AM    BUN 50 (H) 12/10/2021 03:46 AM    Creatinine 3.70 (H) 12/10/2021 03:46 AM    Calcium 7.4 (L) 12/10/2021 03:46 AM    Magnesium 2.2 12/10/2021 03:46 AM    Phosphorus 3.2 12/10/2021 03:46 AM      Lab Results   Component Value Date/Time    Alk.  phosphatase 118 (H) 12/10/2021 03:46 AM    Protein, total 4.4 (L) 12/10/2021 03:46 AM    Albumin 1.8 (L) 12/10/2021 03:46 AM    Globulin 2.6 12/10/2021 03:46 AM    GGT 30 11/16/2011 04:00 AM     Lab Results   Component Value Date/Time    INR 1.0 12/09/2021 04:10 AM    INR 1.1 12/09/2021 04:10 AM    Prothrombin time 11.2 12/09/2021 04:10 AM    Prothrombin time 11.9 (H) 12/09/2021 04:10 AM    aPTT 31 12/09/2021 04:10 AM      Lab Results   Component Value Date/Time    Iron 57 11/22/2018 05:27 AM    TIBC 234 (L) 11/22/2018 05:27 AM    Iron % saturation 24 11/22/2018 05:27 AM    Ferritin 1,339 (H) 12/06/2021 09:39 AM Lab Results   Component Value Date/Time    pH 7.41 12/06/2021 04:39 PM    PCO2 35 12/06/2021 04:39 PM    PO2 436 (H) 12/06/2021 04:39 PM     No components found for: [place]   Lab Results   Component Value Date/Time     01/16/2019 07:09 AM    CK - MB 2.3 01/16/2019 07:09 AM                Total time: 70  Counseling / coordination time, spent as noted above: 50  > 50% counseling / coordination?:  Y    Prolonged service was provided for  []30 min   []75 min in face to face time in the presence of the patient, spent as noted above. Time Start:   Time End:   Note: this can only be billed with 48514 (initial) or 48285 (follow up). If multiple start / stop times, list each separately.

## 2021-12-10 NOTE — PROGRESS NOTES
GI PROGRESS NOTE  Ryanne Meadows, LISA  [phone] NP in-hospital cell phone M-F until 4:30  After 5pm or on weekends, please call  for physician on call    NAME:Claude Sherryl Reed :1975 OPE:[id number]   ATTG: Dr Taniya Delacruz PCP: Mana Montez MD Date/Time:  12/10/2021 0925 AM     Primary GI: Dr. Ranjith Hoang - Dr Burrell Every covering     Assessment:   Acute onset of Hematemesis - 2/2 ulcers in distal esophagus- severe   Acute Blood Loss anemia - hgb dropped to 7.3  Melena  Chronic thrombocytopenia - Plt 54  On Warfarin and ASA 81mg - last dose 21 PM  Hyperkalemia - resolved  · Patient with continued melena   · INR 1.0 today  · 2021:  CT abd/pel showed generalized gaseous distention of large and small bowel. Findings are consistent with a generalized ileus. No evidence of bowel perforation. Continued gastric distention with hemorrhagic material, similar to prior study. · Critical pt on pressors, ventilator, and sedated    EGD 21 : Esophagus:  Full of fresh blood and large clots. Eventually cleared and two ulcers (10 mm) and (5 mm) seen in distal esophagus at 42 cm. Very friable, but no active bleeding. Injected Epi 1 : 10,000 total of 8.5 cc into area around both ulcers. Stomach: Full of huge clots, so only about 20 % of mucosa seen. Clots in stomach are dark and look old. Cannot find pylorus. Duodenum: not entered due to blood obscuring    EGD 2021:  Esophagus:  Upper and mid esophagus appears normal. Distal esophagus looks diffusely ulcerated with large amount of old clot. No active bleeding seen. Stomach: FULL of old blood, large clots. Cannot evaluate gastric mucosa. Pylorus found and patent. Scope was able to be advanced into duodenum  Duodenum: normal bulb and second portion. No ulcers seen     Bacteremia - Gram + sepsis with dental abscess  COVID+ -  Admitted 21  ESRD on dialysis  Chronic pain  HTN     Plan:   · No direct plans for repeat EGD at this time.   Source of bleeding found during recent EGD, no able to treat endoscopically. Recommend medical management. · Continue ICU level care  · Continue acid suppression  · Follow CBC - transfuse with blood, plts, ffp as necessary  · Hold all anticoagulation - previously on coumadin  · Maintain NPO  · Reglan q8hrs  · Rest per primary team  · Symptomatic care per primary team   *Plan of care discussed with Dr. Gil Stevens      Subjective:   Discussed with RN events overnight. Resting in room. Sedated, on vent. On pressors. Review of Systems:  Symptom Y/N Comments  Symptom Y/N Comments   Fever/Chills    Chest Pain     Cough    Headaches     Sputum    Joint Pain     SOB/ZAMUDIO    Pruritis/Rash     Tolerating Diet    Other       Could NOT obtain due to: Sedated and intubated     Objective:   VITALS:   Last 24hrs VS reviewed since prior progress note. Most recent are:  Visit Vitals  /71   Pulse 84   Temp 99 °F (37.2 °C)   Resp 18   Ht 5' 7\" (1.702 m)   Wt 68.2 kg (150 lb 5.7 oz)   SpO2 100%   BMI 23.55 kg/m²       Intake/Output Summary (Last 24 hours) at 12/10/2021 1011  Last data filed at 12/10/2021 0800  Gross per 24 hour   Intake 1056.61 ml   Output 1615 ml   Net -558.39 ml     PHYSICAL EXAM:  ~~Deferred secondary to + COVID~~    Lab and Radiology Data Reviewed: (see below)    Medications Reviewed: (see below)  PMH/SH reviewed - no change compared to H&P  ________________________________________________________________________  Total time spent with patient: 30 minutes ________________________________________________________________________  Care Plan discussed with:  Patient    Family     RN x              Consultant:  Staci Medina NP     Procedures: see electronic medical records for all procedures/Xrays and details which were not copied into this note but were reviewed prior to creation of Plan.       LABS:  Recent Labs     12/10/21  0915 12/10/21  0346 12/09/21  0410 12/09/21  0410   WBC  --  22.9* --  29.2*   HGB 7.3* 5.9*   < > 7.4*   HCT 22.3* 18.1*   < > 21.6*   PLT  --  54*  --  59*  62*    < > = values in this interval not displayed. Recent Labs     12/10/21  0346 12/09/21  1558 12/09/21 0410    138 137   K 4.0 4.1 4.5    104 103   CO2 24 27 26   BUN 50* 51* 55*   CREA 3.70* 3.78* 3.81*   * 80 94   CA 7.4* 7.8* 7.9*   MG 2.2 1.9 2.2   PHOS 3.2 3.3 4.2     Recent Labs     12/10/21  0346 12/09/21  0410 12/08/21 0418   * 121* 102   TP 4.4* 5.2* 5.2*   ALB 1.8* 2.1* 2.1*   GLOB 2.6 3.1 3.1     Recent Labs     12/09/21  0410 12/08/21 0418   INR 1.0  1.1 1.1  1.2*   PTP 11.2  11.9* 11.4  12.3*   APTT 31 36*      No results for input(s): FE, TIBC, PSAT, FERR in the last 72 hours. Lab Results   Component Value Date/Time    Folate 7.5 11/23/2011 11:26 AM     No results for input(s): PH, PCO2, PO2 in the last 72 hours. No results for input(s): CPK, CKMB in the last 72 hours.     No lab exists for component: TROPONINI  Lab Results   Component Value Date/Time    Color RED 11/16/2012 05:15 PM    Appearance OPAQUE 11/16/2012 05:15 PM    Specific gravity 1.020 11/16/2012 05:15 PM    Specific gravity 1.011 09/17/2012 01:35 AM    pH (UA) 8.0 11/16/2012 05:15 PM    Protein >300 (A) 11/16/2012 05:15 PM    Glucose 100 (A) 11/16/2012 05:15 PM    Ketone NEGATIVE  11/16/2012 05:15 PM    Bilirubin NEGATIVE  09/17/2012 01:35 AM    Urobilinogen 0.2 11/16/2012 05:15 PM    Nitrites NEGATIVE  11/16/2012 05:15 PM    Leukocyte Esterase NEGATIVE  11/16/2012 05:15 PM    Epithelial cells 5-10 11/16/2012 05:15 PM    Bacteria 3+ (A) 11/16/2012 05:15 PM    WBC >100 (H) 11/16/2012 05:15 PM    RBC >100 (H) 11/16/2012 05:15 PM       MEDICATIONS:  Current Facility-Administered Medications   Medication Dose Route Frequency    0.9% sodium chloride infusion 250 mL  250 mL IntraVENous PRN    epoetin emilie-epbx (RETACRIT) injection 20,000 Units  20,000 Units SubCUTAneous Q TUE, THU & SAT    glucagon (GLUCAGEN) injection 1 mg  1 mg IntraMUSCular PRN    insulin lispro (HUMALOG) injection   SubCUTAneous Q6H    dextrose (D50W) injection syrg 12.5-25 g  12.5-25 g IntraVENous PRN    TPN ADULT - CENTRAL AA 5% D15% W/ ELECTROLYTES AND CA   IntraVENous CONTINUOUS    NOREPINephrine (LEVOPHED) 8 mg in 5% dextrose 250mL (32 mcg/mL) infusion  0.5-30 mcg/min IntraVENous TITRATE    fentaNYL citrate (PF) injection 25 mcg  25 mcg IntraVENous Q2H PRN    sodium chloride (NS) flush 5-40 mL  5-40 mL IntraVENous Q8H    sodium chloride (NS) flush 5-40 mL  5-40 mL IntraVENous PRN    simethicone (MYLICON) 45VP/6.9QQ oral drops 80 mg  1.2 mL Oral Multiple    0.9% sodium chloride infusion 250 mL  250 mL IntraVENous PRN    [Held by provider] calcium acetate(phosphat bind) (PHOSLO) capsule 1,334 mg  2 Capsule Per NG tube TID WITH MEALS    metoclopramide HCl (REGLAN) injection 5 mg  5 mg IntraVENous Q8H    bicarbonate dialysis (PRISMASOL) BG K 2/Ca 3.5 5000 ml solution   Extracorporeal DIALYSIS CONTINUOUS    albuterol-ipratropium (DUO-NEB) 2.5 MG-0.5 MG/3 ML  3 mL Nebulization Q4H PRN    piperacillin-tazobactam (ZOSYN) 3.375 g in 0.9% sodium chloride (MBP/ADV) 100 mL MBP  3.375 g IntraVENous Q8H    pantoprazole (PROTONIX) 40 mg in 0.9% sodium chloride 10 mL injection  40 mg IntraVENous Q12H    Warfarin- Hold until bleeding has resolved   Other Rx Dosing/Monitoring    albumin human 25% (BUMINATE) solution 25 g  25 g IntraVENous DIALYSIS PRN    propofol (DIPRIVAN) 10 mg/mL infusion  0-50 mcg/kg/min IntraVENous TITRATE    fentaNYL (PF) 1,500 mcg/30 mL (50 mcg/mL) infusion  0-200 mcg/hr IntraVENous TITRATE    alcohol 62% (NOZIN) nasal  1 Ampule  1 Ampule Topical Q12H    chlorhexidine (PERIDEX) 0.12 % mouthwash 15 mL  15 mL Oral Q12H    sodium chloride (NS) flush 5-10 mL  5-10 mL IntraVENous PRN    acetaminophen (TYLENOL) tablet 650 mg  650 mg Oral Q6H PRN    [Held by provider] L.acidophilus-paracasei-S.thermophil-bifidobacter (RISAQUAD) 8 billion cell capsule  1 Capsule Oral DAILY    sodium chloride (NS) flush 5-40 mL  5-40 mL IntraVENous Q8H    sodium chloride (NS) flush 5-40 mL  5-40 mL IntraVENous PRN    polyethylene glycol (MIRALAX) packet 17 g  17 g Oral DAILY PRN    ondansetron (ZOFRAN) injection 4 mg  4 mg IntraVENous Q6H PRN

## 2021-12-10 NOTE — PROGRESS NOTES
Propofol paused for SAT.  0803 Patient continually coughing against  ET tube. RR 35. Patient suctioned for small amount of secretions via ET tube. Propofol restarted at 25mcg/kg/min.  6315 Patient coughing and biting on ET tube. Patient will stop biting on tube when asked to, but will cough again and will then close teeth on tube. Propofol increased to 30mcg/kg/min. 6304 Dr. Raven Elkins rounded on patient. 3515 Dr. Harinder Mejia rounded on patient. Discussed overnight issues with [facility].   1025  Interdisciplinary team rounds were held 12/10/2021 with the following team members:Care Management, Diabetes Treatment Specialist, Nursing, Pharmacy, Physical Therapy and Physician. Plan of care discussed. See clinical pathway and/or care plan for interventions and desired outcomes. Goals of the Day: replace dialysis access, palliative care, monitor H and H.     1210 Consent obtained from patients , Namrata Welsh, for replacement of dialysis catheter. 1212 Palliative care talking with patients . 1630 Called IR to verify that rakel change would take place today. IR confirmed that patient to have rakel changed out today. 1850 IR in to change out rakel.

## 2021-12-10 NOTE — ROUTINE PROCESS
Bedside and Verbal shift change report received from HANDY Culp RN (offgoing nurse). Report included the following information SBAR, Kardex, ED Summary, Procedure Summary, Intake/Output, MAR, Accordion, Recent Results, Med Rec Status, Cardiac Rhythm sinus rhythm, Alarm Parameters  and Quality Measures. Noted resting with his eyes closed, tolerating the current mechanical ventilator settings well. The CVVH remains in-use. TPN has been initiated. The Levophed is at 7mcq/min to keep a SBP > 90 mmHg. The Propofol and Fentanyl are for light sedation. 2130:Levophed increased to maintain his SBP. Otherwise, suctioned for a moderate amount of clear white sputum orally, and repositioned for comfort. He tolerated it well.    2200: Status is unchanged  2300:CVVH Filter clotted, Maksim from [address] [facility] returned my page. 0030: BRYAN Frost with Dialysis at the bedside to restart the machine. 0100:Return extreme positive pressure, therefore blood returned due to unresolved alarms. 0145: RICK Garcia NP informed of the nonfunctional left groin Jorge A catheter; chart reviewed. He agrees to have IR place the line. 0330:Assessment is unchanged, suctioned for a small amount of pale white sputum, and repositioned for comfort. 5598: RICK Garcia NP notified of the low Hgb of 5.9; I received orders to transfuse one unit of PRBC's. No active bleeding noted. 0600:Packed red blood cells initiated, continue to monitor. 0615:No signs or symptoms of a reaction, continue to monitor. 0630:Noted with a 5 beat run of non sustained V-Tach. VSS  0700:He is tolerating the PRBC's well. 0730: Bedside and Verbal shift change report given to HANDY Culp RN (oncoming nurse) by myself (offgoing nurse). Report included the following information SBAR, Kardex, ED Summary, Procedure Summary, Intake/Output, MAR, Accordion, Recent Results, Med Rec Status, Cardiac Rhythm sinus rhythm, Alarm Parameters  and Quality Measures.

## 2021-12-10 NOTE — PROGRESS NOTES
Spiritual Care Assessment/Progress Note  [facility]      NAME: Tomi Whiteside      MRN: [id number]  AGE: 42 y.o. SEX: male  Yazdanism Affiliation: Anabaptist   Language: English     12/10/2021     Total Time (in minutes): 5     Spiritual Assessment begun in [facility] CRITICAL CARE 3 through conversation with:         []Patient        [] Family    [] Friend(s)        Reason for Consult: Palliative Care, Initial/Spiritual Assessment     Spiritual beliefs: (Please include comment if needed)     [] Identifies with a brown tradition:         [] Supported by a brown community:            [] Claims no spiritual orientation:           [] Seeking spiritual identity:                [] Adheres to an individual form of spirituality:           [x] Not able to assess:                           Identified resources for coping:      [] Prayer                               [] Music                  [] Guided Imagery     [] Family/friends                 [] Pet visits     [] Devotional reading                         [] Unknown     [] Other:                                          Interventions offered during this visit: (See comments for more details)    Patient Interventions: Interdisciplinary rounds           Plan of Care:     [] Support spiritual and/or cultural needs    [] Support AMD and/or advance care planning process      [] Support grieving process   [] Coordinate Rites and/or Rituals    [] Coordination with community clergy   [] No spiritual needs identified at this time   [] Detailed Plan of Care below (See Comments)  [] Make referral to Music Therapy  [] Make referral to Pet Therapy     [] Make referral to Addiction services  [] Make referral to [facility]  [] Make referral to Spiritual Care Partner  [] No future visits requested        [] Contact Spiritual Care for further referrals     Comments: Attempted initial spiritual assessment with palliative pt in 2516.  Pt unable to respond at this time and inaccessible due to contact restrictions. Called pt's sister Trudy Heart. After introducing self and role left a voicemail advising of availability of chaplains for support. Contact Spiritual Care for any further referrals.   Rhett Hawk M.Div, [facility]   Paging Service [phone])

## 2021-12-11 LAB
ANION GAP SERPL CALC-SCNC: 8 MMOL/L (ref 5–15)
ANION GAP SERPL CALC-SCNC: 8 MMOL/L (ref 5–15)
BASOPHILS # BLD: 0 K/UL (ref 0–0.1)
BASOPHILS NFR BLD: 0 % (ref 0–1)
BUN SERPL-MCNC: 46 MG/DL (ref 6–20)
BUN SERPL-MCNC: 53 MG/DL (ref 6–20)
BUN/CREAT SERPL: 13 (ref 12–20)
BUN/CREAT SERPL: 14 (ref 12–20)
CALCIUM SERPL-MCNC: 8.2 MG/DL (ref 8.5–10.1)
CALCIUM SERPL-MCNC: 8.2 MG/DL (ref 8.5–10.1)
CHLORIDE SERPL-SCNC: 102 MMOL/L (ref 97–108)
CHLORIDE SERPL-SCNC: 103 MMOL/L (ref 97–108)
CO2 SERPL-SCNC: 25 MMOL/L (ref 21–32)
CO2 SERPL-SCNC: 25 MMOL/L (ref 21–32)
CREAT SERPL-MCNC: 3.64 MG/DL (ref 0.7–1.3)
CREAT SERPL-MCNC: 3.9 MG/DL (ref 0.7–1.3)
DIFFERENTIAL METHOD BLD: ABNORMAL
EOSINOPHIL # BLD: 0 K/UL (ref 0–0.4)
EOSINOPHIL NFR BLD: 0 % (ref 0–7)
ERYTHROCYTE [DISTWIDTH] IN BLOOD BY AUTOMATED COUNT: 18.6 % (ref 11.5–14.5)
GLUCOSE BLD STRIP.AUTO-MCNC: 102 MG/DL (ref 65–117)
GLUCOSE BLD STRIP.AUTO-MCNC: 84 MG/DL (ref 65–117)
GLUCOSE BLD STRIP.AUTO-MCNC: 91 MG/DL (ref 65–117)
GLUCOSE BLD STRIP.AUTO-MCNC: 94 MG/DL (ref 65–117)
GLUCOSE SERPL-MCNC: 110 MG/DL (ref 65–100)
GLUCOSE SERPL-MCNC: 95 MG/DL (ref 65–100)
HCT VFR BLD AUTO: 23.9 % (ref 36.6–50.3)
HGB BLD-MCNC: 7.5 G/DL (ref 12.1–17)
HGB BLD-MCNC: 7.7 G/DL (ref 12.1–17)
HGB BLD-MCNC: 7.8 G/DL (ref 12.1–17)
IMM GRANULOCYTES # BLD AUTO: 0.3 K/UL (ref 0–0.04)
IMM GRANULOCYTES NFR BLD AUTO: 1 % (ref 0–0.5)
LYMPHOCYTES # BLD: 0.6 K/UL (ref 0.8–3.5)
LYMPHOCYTES NFR BLD: 2 % (ref 12–49)
MAGNESIUM SERPL-MCNC: 2 MG/DL (ref 1.6–2.4)
MAGNESIUM SERPL-MCNC: 2.2 MG/DL (ref 1.6–2.4)
MCH RBC QN AUTO: 28.5 PG (ref 26–34)
MCHC RBC AUTO-ENTMCNC: 32.2 G/DL (ref 30–36.5)
MCV RBC AUTO: 88.5 FL (ref 80–99)
MONOCYTES # BLD: 1.1 K/UL (ref 0–1)
MONOCYTES NFR BLD: 4 % (ref 5–13)
NEUTS SEG # BLD: 26.7 K/UL (ref 1.8–8)
NEUTS SEG NFR BLD: 93 % (ref 32–75)
NRBC # BLD: 0.53 K/UL (ref 0–0.01)
NRBC BLD-RTO: 1.8 PER 100 WBC
PHOSPHATE SERPL-MCNC: 2.8 MG/DL (ref 2.6–4.7)
PHOSPHATE SERPL-MCNC: 3.4 MG/DL (ref 2.6–4.7)
PLATELET # BLD AUTO: 54 K/UL (ref 150–400)
PMV BLD AUTO: 12.7 FL (ref 8.9–12.9)
POTASSIUM SERPL-SCNC: 3.9 MMOL/L (ref 3.5–5.1)
POTASSIUM SERPL-SCNC: 3.9 MMOL/L (ref 3.5–5.1)
RBC # BLD AUTO: 2.7 M/UL (ref 4.1–5.7)
RBC MORPH BLD: ABNORMAL
RBC MORPH BLD: ABNORMAL
SERVICE CMNT-IMP: NORMAL
SODIUM SERPL-SCNC: 135 MMOL/L (ref 136–145)
SODIUM SERPL-SCNC: 136 MMOL/L (ref 136–145)
WBC # BLD AUTO: 28.7 K/UL (ref 4.1–11.1)

## 2021-12-11 PROCEDURE — 90945 DIALYSIS ONE EVALUATION: CPT

## 2021-12-11 PROCEDURE — 80048 BASIC METABOLIC PNL TOTAL CA: CPT

## 2021-12-11 PROCEDURE — 74011000250 HC RX REV CODE- 250: Performed by: INTERNAL MEDICINE

## 2021-12-11 PROCEDURE — 74011250636 HC RX REV CODE- 250/636: Performed by: INTERNAL MEDICINE

## 2021-12-11 PROCEDURE — 85018 HEMOGLOBIN: CPT

## 2021-12-11 PROCEDURE — 2709999900 HC NON-CHARGEABLE SUPPLY

## 2021-12-11 PROCEDURE — 82962 GLUCOSE BLOOD TEST: CPT

## 2021-12-11 PROCEDURE — C9113 INJ PANTOPRAZOLE SODIUM, VIA: HCPCS | Performed by: INTERNAL MEDICINE

## 2021-12-11 PROCEDURE — 36415 COLL VENOUS BLD VENIPUNCTURE: CPT

## 2021-12-11 PROCEDURE — 84100 ASSAY OF PHOSPHORUS: CPT

## 2021-12-11 PROCEDURE — 65610000006 HC RM INTENSIVE CARE

## 2021-12-11 PROCEDURE — 74011000250 HC RX REV CODE- 250: Performed by: HOSPITALIST

## 2021-12-11 PROCEDURE — 74011000258 HC RX REV CODE- 258: Performed by: INTERNAL MEDICINE

## 2021-12-11 PROCEDURE — 85025 COMPLETE CBC W/AUTO DIFF WBC: CPT

## 2021-12-11 PROCEDURE — 83735 ASSAY OF MAGNESIUM: CPT

## 2021-12-11 PROCEDURE — 94003 VENT MGMT INPAT SUBQ DAY: CPT

## 2021-12-11 PROCEDURE — 74011250636 HC RX REV CODE- 250/636: Performed by: HOSPITALIST

## 2021-12-11 PROCEDURE — 0202U NFCT DS 22 TRGT SARS-COV-2: CPT

## 2021-12-11 PROCEDURE — 74011250637 HC RX REV CODE- 250/637: Performed by: INTERNAL MEDICINE

## 2021-12-11 RX ORDER — VANCOMYCIN 1.75 GRAM/500 ML IN 0.9 % SODIUM CHLORIDE INTRAVENOUS
1750
Status: COMPLETED | OUTPATIENT
Start: 2021-12-11 | End: 2021-12-11

## 2021-12-11 RX ADMIN — PIPERACILLIN AND TAZOBACTAM 3.38 G: 3; .375 INJECTION, POWDER, LYOPHILIZED, FOR SOLUTION INTRAVENOUS at 02:03

## 2021-12-11 RX ADMIN — Medication 1 AMPULE: at 20:27

## 2021-12-11 RX ADMIN — CALCIUM CHLORIDE, MAGNESIUM CHLORIDE, DEXTROSE MONOHYDRATE, LACTIC ACID, SODIUM CHLORIDE, SODIUM BICARBONATE AND POTASSIUM CHLORIDE: 3.68; 3.05; 22; 5.4; 6.46; 3.09; .314 INJECTION INTRAVENOUS at 10:22

## 2021-12-11 RX ADMIN — Medication 10 ML: at 22:52

## 2021-12-11 RX ADMIN — METOCLOPRAMIDE HYDROCHLORIDE 5 MG: 5 INJECTION INTRAMUSCULAR; INTRAVENOUS at 22:52

## 2021-12-11 RX ADMIN — CHLORHEXIDINE GLUCONATE 15 ML: 1.2 RINSE ORAL at 20:27

## 2021-12-11 RX ADMIN — SODIUM CHLORIDE 40 MG: 9 INJECTION INTRAMUSCULAR; INTRAVENOUS; SUBCUTANEOUS at 09:12

## 2021-12-11 RX ADMIN — CALCIUM CHLORIDE, MAGNESIUM CHLORIDE, DEXTROSE MONOHYDRATE, LACTIC ACID, SODIUM CHLORIDE, SODIUM BICARBONATE AND POTASSIUM CHLORIDE: 3.68; 3.05; 22; 5.4; 6.46; 3.09; .314 INJECTION INTRAVENOUS at 19:02

## 2021-12-11 RX ADMIN — Medication 10 ML: at 06:07

## 2021-12-11 RX ADMIN — Medication 150 MCG/HR: at 02:58

## 2021-12-11 RX ADMIN — PROPOFOL 45 MCG/KG/MIN: 10 INJECTION, EMULSION INTRAVENOUS at 03:15

## 2021-12-11 RX ADMIN — PIPERACILLIN AND TAZOBACTAM 3.38 G: 3; .375 INJECTION, POWDER, LYOPHILIZED, FOR SOLUTION INTRAVENOUS at 11:20

## 2021-12-11 RX ADMIN — CALCIUM CHLORIDE, MAGNESIUM CHLORIDE, DEXTROSE MONOHYDRATE, LACTIC ACID, SODIUM CHLORIDE, SODIUM BICARBONATE AND POTASSIUM CHLORIDE: 5.15; 2.03; 22; 5.4; 6.46; 3.09; .157 INJECTION INTRAVENOUS at 06:18

## 2021-12-11 RX ADMIN — VANCOMYCIN HYDROCHLORIDE 1750 MG: 10 INJECTION, POWDER, LYOPHILIZED, FOR SOLUTION INTRAVENOUS at 13:14

## 2021-12-11 RX ADMIN — METOCLOPRAMIDE HYDROCHLORIDE 5 MG: 5 INJECTION INTRAMUSCULAR; INTRAVENOUS at 06:07

## 2021-12-11 RX ADMIN — PIPERACILLIN AND TAZOBACTAM 3.38 G: 3; .375 INJECTION, POWDER, LYOPHILIZED, FOR SOLUTION INTRAVENOUS at 19:02

## 2021-12-11 RX ADMIN — METOCLOPRAMIDE HYDROCHLORIDE 5 MG: 5 INJECTION INTRAMUSCULAR; INTRAVENOUS at 13:19

## 2021-12-11 RX ADMIN — CALCIUM CHLORIDE, MAGNESIUM CHLORIDE, DEXTROSE MONOHYDRATE, LACTIC ACID, SODIUM CHLORIDE, SODIUM BICARBONATE AND POTASSIUM CHLORIDE: 3.68; 3.05; 22; 5.4; 6.46; 3.09; .314 INJECTION INTRAVENOUS at 15:08

## 2021-12-11 RX ADMIN — EPOETIN ALFA-EPBX 20000 UNITS: 20000 INJECTION, SOLUTION INTRAVENOUS; SUBCUTANEOUS at 20:27

## 2021-12-11 RX ADMIN — Medication 150 MCG/HR: at 13:30

## 2021-12-11 RX ADMIN — Medication 10 ML: at 13:25

## 2021-12-11 RX ADMIN — Medication 125 MCG/HR: at 23:18

## 2021-12-11 RX ADMIN — CHLORHEXIDINE GLUCONATE 15 ML: 1.2 RINSE ORAL at 09:00

## 2021-12-11 RX ADMIN — SODIUM CHLORIDE 40 MG: 9 INJECTION INTRAMUSCULAR; INTRAVENOUS; SUBCUTANEOUS at 20:26

## 2021-12-11 RX ADMIN — Medication 1 AMPULE: at 13:26

## 2021-12-11 RX ADMIN — ASCORBIC ACID, VITAMIN A PALMITATE, CHOLECALCIFEROL, THIAMINE HYDROCHLORIDE, RIBOFLAVIN-5 PHOSPHATE SODIUM, PYRIDOXINE HYDROCHLORIDE, NIACINAMIDE, DEXPANTHENOL, ALPHA-TOCOPHEROL ACETATE, VITAMIN K1, FOLIC ACID, BIOTIN, CYANOCOBALAMIN: 200; 3300; 200; 6; 3.6; 6; 40; 15; 10; 150; 600; 60; 5 INJECTION, SOLUTION INTRAVENOUS at 18:00

## 2021-12-11 NOTE — PROGRESS NOTES
Gi on call for [facility]    Chart reviewed  I will see on request today    Lindsay Bowling MD  8:44 AM  12/11/2021

## 2021-12-11 NOTE — PROGRESS NOTES
Pharmacy Antimicrobial Kinetic Dosing    Indication for Antimicrobials: HAP     Current Regimen of Each Antimicrobial:  CRRT pt  Vancomycin consult - started 12/11 x 7 days  Zosyn 3.375gm IV q8h - started  day 5     Previous Antimicrobial Therapy:  -  Vancomycin Goal Level:   15    Date Dose & Interval Measured (mcg/mL) Predicted AUC/ZECHARIAH    am      12 am              Dosing calculator used:  per levels    Significant Positive Cultures:    R Panel - candida albicans, occ GPC   COVID +   BCx - ng final    Conditions for Dosing Consideration:     Labs:  Recent Labs     21  0303 12/10/21  1540 12/10/21  0346 21  1558 21  0410   CREA 3.90* 4.27* 3.70*   < > 3.81*   BUN 53* 54* 50*   < > 55*   PCT  --   --   --   --  46.86    < > = values in this interval not displayed. Recent Labs     21  0303 12/10/21  0346 21  0410 21  1448   WBC 28.7* 22.9* 29.2* 22.8*     Temp (24hrs), Av.8 °F (37.1 °C), Min:97.1 °F (36.2 °C), Max:100.8 °F (38.2 °C)    Creatinine Clearance (mL/min):   CrCl (Ideal Body Weight): 22.6   If actual weight < IBW: CrCl (Actual Body Weight) 23.3    Impression/Plan:   Continue CRRT  Vancomycin 1750mg IV loading dose then 1000mg IV q24h  Plan trough level after LD 12/12 am and before third dose 12/13 am  Continue Zosyn CRRT regimen  Antimicrobial stop date - Vanc 7 days, Zosyn pending       Pharmacy will follow daily and adjust medications as appropriate for renal function and/or serum levels.     Thank you,  Jennifer Farooq, [facility]

## 2021-12-11 NOTE — PROGRESS NOTES
[address]  YOB: 1975          Assessment & Plan:   ESRD on HHD, recently ICHD for Abx ([facility] [place])  COVID + PNA  Shock  Anemia  CAD  History of HIT    Rec:  Poor filter patency even with 100% pre-filter RF. Will change to CVVHD and if no better, will need pharmacy consult for argatroban gtt. Regional citrate not available I am told. Change to 4K bath  Continue THAO       Subjective:   CC: f/u ESRD  HPI: Remains on pressors and vent. Recurrent clotting on CVVH even with 100% pre-filter RF. On EPO for anemia.    ROS: unable to obtain due to pt condition  Current Facility-Administered Medications   Medication Dose Route Frequency    0.9% sodium chloride infusion 250 mL  250 mL IntraVENous PRN    TPN ADULT - CENTRAL AA 5% D15% W/ ELECTROLYTES AND CA   IntraVENous CONTINUOUS    epoetin emilie-epbx (RETACRIT) injection 20,000 Units  20,000 Units SubCUTAneous Q TUE, THU & SAT    glucagon (GLUCAGEN) injection 1 mg  1 mg IntraMUSCular PRN    insulin lispro (HUMALOG) injection   SubCUTAneous Q6H    dextrose (D50W) injection syrg 12.5-25 g  12.5-25 g IntraVENous PRN    NOREPINephrine (LEVOPHED) 8 mg in 5% dextrose 250mL (32 mcg/mL) infusion  0.5-30 mcg/min IntraVENous TITRATE    fentaNYL citrate (PF) injection 25 mcg  25 mcg IntraVENous Q2H PRN    simethicone (MYLICON) 12PN/6.1HC oral drops 80 mg  1.2 mL Oral Multiple    [Held by provider] calcium acetate(phosphat bind) (PHOSLO) capsule 1,334 mg  2 Capsule Per NG tube TID WITH MEALS    metoclopramide HCl (REGLAN) injection 5 mg  5 mg IntraVENous Q8H    bicarbonate dialysis (PRISMASOL) BG K 2/Ca 3.5 5000 ml solution   Extracorporeal DIALYSIS CONTINUOUS    albuterol-ipratropium (DUO-NEB) 2.5 MG-0.5 MG/3 ML  3 mL Nebulization Q4H PRN    piperacillin-tazobactam (ZOSYN) 3.375 g in 0.9% sodium chloride (MBP/ADV) 100 mL MBP  3.375 g IntraVENous Q8H    pantoprazole (PROTONIX) 40 mg in 0.9% sodium chloride 10 mL injection  40 mg IntraVENous Q12H    Warfarin- Hold until bleeding has resolved   Other Rx Dosing/Monitoring    albumin human 25% (BUMINATE) solution 25 g  25 g IntraVENous DIALYSIS PRN    propofol (DIPRIVAN) 10 mg/mL infusion  0-50 mcg/kg/min IntraVENous TITRATE    fentaNYL (PF) 1,500 mcg/30 mL (50 mcg/mL) infusion  0-200 mcg/hr IntraVENous TITRATE    alcohol 62% (NOZIN) nasal  1 Ampule  1 Ampule Topical Q12H    chlorhexidine (PERIDEX) 0.12 % mouthwash 15 mL  15 mL Oral Q12H    acetaminophen (TYLENOL) tablet 650 mg  650 mg Oral Q6H PRN    [Held by provider] L.acidophilus-paracasei-S.thermophil-bifidobacter (RISAQUAD) 8 billion cell capsule  1 Capsule Oral DAILY    sodium chloride (NS) flush 5-40 mL  5-40 mL IntraVENous Q8H    sodium chloride (NS) flush 5-40 mL  5-40 mL IntraVENous PRN    polyethylene glycol (MIRALAX) packet 17 g  17 g Oral DAILY PRN    ondansetron (ZOFRAN) injection 4 mg  4 mg IntraVENous Q6H PRN          Objective:     Vitals:  Blood pressure 126/76, pulse 93, temperature 98.1 °F (36.7 °C), resp. rate 24, height 5' 7\" (1.702 m), weight 68.2 kg (150 lb 5.7 oz), SpO2 92 %. Temp (24hrs), Av.8 °F (37.1 °C), Min:97.1 °F (36.2 °C), Max:100.8 °F (38.2 °C)      Intake and Output:  No intake/output data recorded.  1901 -  0700  In: 3252.9 [I.V.:2942.9]  Out: 2133     Physical Exam:               Evaluated from outside room due to covid isolation        ECG/rhythm:    Data Review      No results for input(s): TNIPOC in the last 72 hours. No lab exists for component: ITNL   No results for input(s): CPK, CKMB, TROIQ in the last 72 hours.   Recent Labs     21  0303 12/10/21  1806 12/10/21  1540 12/10/21  0915 12/10/21  0346 12/10/21  0346 21  1558 21  0410   *  --  137  --   --  138   < > 137   K 3.9  --  4.0  --   --  4.0   < > 4.5     --  104  --   --  105   < > 103   CO2 25  --  25  --   -- 24   < > 26   BUN 53*  --  54*  --   --  50*   < > 55*   CREA 3.90*  --  4.27*  --   --  3.70*   < > 3.81*   GLU 95  --  109*  --   --  108*   < > 94   PHOS 3.4  --  3.3  --   --  3.2   < > 4.2   MG 2.0  --  2.1  --   --  2.2   < > 2.2   CA 8.2*  --  7.3*  --   --  7.4*   < > 7.9*   ALB  --   --   --   --   --  1.8*  --  2.1*   WBC 28.7*  --   --   --   --  22.9*  --  29.2*   HGB 7.7* 7.2*  --  7.3*   < > 5.9*  --  7.4*   HCT 23.9*  --   --  22.3*  --  18.1*  --  21.6*   PLT 54*  --   --   --   --  54*  --  59*  62*    < > = values in this interval not displayed. Recent Labs     12/09/21  0410   INR 1.0  1.1   PTP 11.2  11.9*   APTT 31     Needs: urine analysis, urine sodium, protein and creatinine  Lab Results   Component Value Date/Time    Sodium,urine random 115 07/14/2011 07:20 PM    Creatinine, urine 52.2 07/14/2011 07:20 PM           : Ricky Waters MD  12/11/2021        [facility]:  [email or web address]  Manda [place] office:  [address] [place], [address] [place] [address] [address]  [place], [address] [facility]  Phone: [phone]  Fax :     [phone]    [place] office:  [address] [place] [address]  Amandeep Kincaidmoarnaldo  Phone - [phone]  Fax - [phone]

## 2021-12-11 NOTE — PROGRESS NOTES
1900: Bedside shift change report given to MANUELITO Barkley and [place], RN (oncoming nurse) by Christopher Kamara RN (offgoing nurse). Report included the following information SBAR and Kardex. IR at bedside placing new Jorge A Catheter. 2000: Assessment completed. Patient following some commands. Opens eyes spontaneously. Lungs diminished posteriorly and coarse anteriorly. Patient tolerating vent. No active bleeding seen at this time. No urine output. Pulses intact. CRRT restarted. 2100: Patient fully awake. Answering yes or no questions with nodding. Pink tinged secretions noted. Propofol increased to 45 mcg/hr to maintain RASS goal of -2.     0000: Reassessment completed. No changes. 0400: Reassessment completed. No changes. Patient tolerating CVVH.     0610: Propofol held for SBT.     0630: CVVH stopped. Unable to return blood due to clotted material. Lucretia Clements paged. 4980: Discussed SBT with RT Kike Ward. Patient to stay on spontaneous. Propofol continues to be held.     0700: Bedside shift change report given to Rasta Conroy RN (oncoming nurse) by MANUELITO [place], RN (offgoing nurse). Report included the following information SBAR and Kardex.

## 2021-12-11 NOTE — PROGRESS NOTES
Pharmacy Anticoagulation for CRRT clotting      Impression/Plan:   · Regional citrate  Anticoagulation product is available, however the CALCIUM FREE Prismasol is currently on allocation and [facility] would not be able to receive an adequate supply to appropriately provide citrate anticoagulation since the Prismasol would need to be calcium free  · Per the Nephrologist note, if the CRRT clots off, Argatroban protocol (systemic anticogulation) could be initiated after the Nephrologist orders Argatroban protocol, however given the bleeding history this option appears too risky/contraindicated.        Thank you,  Kayren Siemens, [facility]

## 2021-12-11 NOTE — DIALYSIS
CRRT / [phone]    Orders   Mode: CVVHD started @ 0815   Blood Flow Rate: 200 ml/min   Prismasol Dose: 20 ml/kg/hr x 68 kg = 1360, Rounded to 1350 ml/hr Dialysate   Prismasol Concentrate: 2K/3.5Ca   Blood Warmer Temp: 37*C   Net Fluid Removal: 0 ml/hr     Metrics   BP: 127/75   HR: 97   Access Pressure: -91   Filter Pressure: 134   Return Pressure: 87   TMP: 21   Pressure Drop: 17     Access   Type & Location: L femoral CVC   Comments: Gauze dressing CDI. +aspiration/+flush x2 ports, lines reversed. Labs   HBsAg (Antigen) / date: Negative 12/1/21      HBsAb (Antibody) / date: Immune 11/11/21   Source: [facility]     Safety:   Time Out Done:   (Time) 2300   Consent obtained/signed: Verified   Education: CVC infection prevention & control. Primary Nurse Rpt Pre: Michael Medeiros RN   Primary Nurse Rpt Post: Michael Medeiros RN     Comments / Plan:      Filter changed secondary to clotting. Estimated blood loss (165 ml) primary RN not able to return blood due to clotting in line. Consents, patient, code status, labs and orders verified. Old set discarded in red bio-hazard bag. New [id number] filter set-up, primed, tested and running well at this time. L femoral CVC, dressing CDI with gauze & bio-patch. No signs of redness, drainage, or infection visualized. Each catheter limb disinfected for 60 seconds per limb with alcohol swabs. Caps removed, dialysis CVC hub scrubbed with Prevantics for 15 seconds, followed by a 5 second dry time per Hospital P&P. +aspiration/+flush x 2 ports. Lines REVERSED, visible and connections secure with blood warmer to return line at 37*C. Education & pre/post report given to RN. Discussed frequent clotting issues with Dr. Skylar Thomas, per MD change modality to CVVHD & if run times don't improve start systemic argatroban. Discussed with primary RN that if access pressure alarms occur he can reduce BFR to 180 ml/min.

## 2021-12-11 NOTE — PROGRESS NOTES
12/11/21 0630 12/11/21 0641   ABCDEF Bundle   SBT Safety Screen Passed Yes  --    SBT Trial Passed  --  Yes   Weaning Parameters   Spontaneous Breathing Trial Complete  --  Yes   Resp Rate Observed 29 25   Ve 10.4 11.3    344   RSBI 79 76   Remains on Spontaneous, PS 6, PEEP 5, 30%

## 2021-12-11 NOTE — DIALYSIS
CRRT / [phone]           Orders   Mode: CVVH restarted @ 3498   Blood Flow Rate: 200 ml/min **Lowered to 180 ml/min for optimal pressures   Dialysate Flow Rate: 20 ml/kg/hr x 65 kg = 1,300 ml/hr  PBP: 650 ml/hr  Dialysate: 650 ml/hr  **100% Pre-filter   Prismasol Concentrate: 2K / 3.5Ca   Blood Warmer Temp: 37*C   Net Fluid Removal: 50 ml/hr            Metrics   BP: 115/68   HR: 94   Access Pressure: -81   Filter Pressure: 146   Return Pressure: 91   TMP: 44   Pressure Drop: 25            Access   Type & Location: Left femoral temp. CVC, s/p line exchange, placement confirmed by KUB. Occlusive gauze dressing C/D/I, dated 12/10/21. Catheter securement device applied to support. Each catheter limb disinfected for 60 seconds per limb with alcohol swabs, caps removed and hubs scrubbed with Prevantics for 15 seconds, followed by a 5 second dry time per Hospital P&P. +asp/+flush x 2 ports, both remain sluggish.   **5-10 mins into treatment having access alarms / pressures > 300 - multiple troubleshooting attempts - lines reversed & BFR lowered.              Labs   HBsAg (Antigen) / date: Negative 12/1/21                HBsAb (Antibody) / date: Immune 11/11/21   Source: [facility]            Safety:   Time Out Done:    2025   Consent obtained/signed: Verified   Education: Pt intubated/sedated   Primary Nurse Rpt Pre: Robert Norman RN   Primary Nurse Rpt Post: Robert Norman RN      Comments / Plan:       Patient, code status, labs, and orders verified. Consents on chart. Time out completed. Pt COVID-19 +; All appropriate PPE worn by dialysis RN during care. In at bedside to restart CVVH s/p line exchange, placement confirmed by KUB. Primary RN returned all possible blood 165 mls. Old set discarded in biohazard bin. New HF-1000 filter set-up, primed w/ 1L NS, tested and running well. Lines reversed, visible and connections secure with blood warmer supported on return line, set at [facility].  Education and pre/post report to primary MANUELITO.

## 2021-12-11 NOTE — PROGRESS NOTES
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 He was admitted to Hospitalist Service with Acute COVID infection, mild hypoxemic respiratory failure, recent strep intermedius bacteremia. Transferred to ICU  after developing hematemesis and shock. PRINCIPLE ICU DIAGNOSIS:  Hemorrhagic shock due to UGIB    Hospital course/major results:   Admission as above   Transferred to ICU for hematemesis, shock. Intubated semi-electively for EGD. Required multiple units RBC, FFP, plts. EGD revealed small distal esophageal ulcers and large volume of blood and clots in stomach without clear source of bleeding identified. Required vasopressors. Continued blood loss into night requiring more blood products.  CTAP: 1. There is a large amount of hemorrhage within a distended stomach, extending into the duodenum. An active bleed is not seen on this examination. However, delayed images were not performed, somewhat limiting the study. 2. Bilateral pleural effusions with bilateral pneumonia.  L femoral HD catheter palced and CRRT initiated   Remains intubated and on CRRT. Further bleeding with Hgb 6.5. Received one unit RBCs. Repeat EGD: Severe distal esophageal ulceration which is likely cause of bleeding, 2. Stomach full of blood---mucosa not well seen 3. Duodenum is normal. Worsening abdominal distention. Repeat CTAP: Generalized gaseous distention of large and small bowel. Findings are consistent with a generalized ileus. 2. No evidence of bowel perforation. 3. Continued gastric distention with hemorrhagic material, similar to prior study. 4. NG tube in the stomach.  5. Covid 19 pneumonia has worsened in the visualized lung bases. 6. Continued small to moderate bilateral pleural effusions. 12/08 General Surgery consultation  12/08 Palliative Care consultation requested  12/09 Stabilizing. Vasopressor requirements improving. Abdomen exam much improved - softer, less distended. TPN initiated  12/10: Patient remains intubated and sedated, on minimal vent settings. Still has ongoing melena. Transfused one U of blood this morning. Dialysis catheter clotted overnight. 12/11: Patient remains intubated, able to follow commands. On SBT. No acute events overnight. Lines/tubes/devices:  ETT 12/06 >>   R femoral CVL 12/06 >>   L femoral HD catheter 12/07 >>       COMPREHENSIVE CCM ASSESSMENT/PLAN (by systems)       PULMONARY:  1. Vent dep respiratory failure - intubated for EGD  2. B pulmonary infiltrates - likely aspiration    Current vent settings: PRVC 400/15/5/40%. PLAN   - Patient is doing well on SBT, will plan to extubate. CARDIOVASCULAR/HEMODYNAMIC:  3. Shock - hemorrhagic       PLAN  - cardiac/hemodynamic monitoring  - MAP goal > 60 mmHg  - SBP goal > 100 mmHg      RENAL:  4. ESRD  5. Mild metabolic acidosis  6. Mild hyperkalemia    Current RRT:     PLAN  - Monitor chemistry panel daily  - Correct electrolytes as indicated  - Nephrology following  - CRRT per Nephrology - initiated 12/07    GI/NUTRITION:  7. Acute UGIB  8. Distal esophageal ulceration  9. Protein-calorie malnutrition      Current nutritional support: none  SUP: high dose IV PPI    PLAN  - Cont high dose PPI  - Gastroenterology following - their assistance appreciated  - General Surgery consultation 12/08 - no indication for surgery  - Initiate TPN 12/09      INFECTIOUS DISEASE  10. Suspect aspiration PNA  11.  Severe sepsis    Micro:  Resp 12/07 >>    Antibiotics:  Pip-tazo 12/07 >>     PLAN  - Follow culture results to completion  - Duration of antimicrobial therapy TBD  - Track PCT    HEME  12. ABLA  13. Thrombocytopenia  14. H/O DVT on chronic warfarin    DVT prophylaxis: SCDs - has IVC filter    PLAN  - Daily CBC  - Transfuse as needed to maintain Hgb > 7.0 gm/dL or for hemodynamically significant bleeding  - Transfuse plts to maintain > 50k  - Transfuse FFP to maintain INR < 1.5  - Holding warfarin    NEURO  15. Cognition fully intact prior to admission  16. ICU/vent associated discomfort      RASS goal: -1, -2  Current sedatives: propofol  Current analgesics: fentanyl    PLAN   - Daily SAT when meets ICU criteria  - ABCDEF mobility bundle  - PT/OT involvement when appropriate      ENDOCRINE  17. Mild hyperglycemia without prior dx of DM    PLAN  - Target glucose: 100-180  - Glycemic control: N/I presently    GOALS OF CARE/ADVANCED DIRECTIVES  18. CODE STATUS: Full   19. HPI/Consult/Subjective:   24 Hr Events 12/11/2021:   As above    SUBJ:   RASS -2, + F/C, synchronous with vent      Past Medical History:      has a past medical history of Abdominal hematoma, AICD (automatic cardioverter/defibrillator) present, CAD (coronary artery disease), Chronic abdominal pain, Chronic kidney disease, DVT (deep venous thrombosis) ([address] (2001), DVT of popliteal vein ([address] (11/2011), Endocarditis, Gallstone pancreatitis, Gastrointestinal disorder, Gastrointestinal disorder, Hemodialysis patient ([address] High cholesterol, Hypertension, Kidney transplant, Long term current use of anticoagulant therapy, Nephrotic syndrome, Other ill-defined conditions(799.89), Other ill-defined conditions(799.89), Other ill-defined conditions(799.89), Peritonitis ([address] Seizures ([address] (2015), Small bowel obstruction ([address] 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[address] and V-tach ([address]     Past Surgical History:      has a past surgical history that includes pr edg us exam surgical alter stom duodenum/jejunum (7/15/2011); pr esophagogastroduodenoscopy transoral diagnostic (5/24/2012); hx cholecystectomy; hx appendectomy; hx small bowel resection; hx other surgical (11/2011); hx other surgical; hx other surgical (02/2013); hx transplant (2001 ); hx transplant (1992); vascular surgery procedure unlist (11/14/2017); hx pacemaker (Left, 6/2009); hx pacemaker (Left); pr cardiac surg procedure unlist (2007); hx vascular access (Right); ir replace cvc tunneled w/o port (9/10/2020); ir remove tunl cvad w/o port / pump (10/29/2020); upper gi endoscopy,ctrl bleed (12/6/2021); ir insert non tunl cvc over 5 yrs (12/7/2021); upper gi endoscopy,diagnosis (12/8/2021); and ir insert non tunl cvc over 5 yrs (12/10/2021). Home Medications:     Prior to Admission medications    Medication Sig Start Date End Date Taking? Authorizing Provider   ondansetron (Zofran ODT) 4 mg disintegrating tablet 1 Tab by SubLINGual route every eight (8) hours as needed for Nausea or Vomiting. 3/27/21   Ruby Troy MD   metoprolol succinate (TOPROL-XL) 25 mg XL tablet Take 0.5 Tabs by mouth daily. 11/10/20   Biagio Duverney, MD   calcium acetate,phosphat bind, (PHOSLO) 667 mg cap Take 2 Caps by mouth three (3) times daily (with meals). Indications: low amount of calcium in the blood, renal osteodystrophy with hyperphosphatemia 11/10/20   Biagio Duverney, MD   atorvastatin (Lipitor) 20 mg tablet Take 20 mg by mouth daily. Provider, Historical   warfarin (COUMADIN) 2.5 mg tablet Take 2.5 mg by mouth daily. Provider, Historical   acetaminophen (TYLENOL) 500 mg tablet Take 1 Tab by mouth every four (4) hours as needed for Pain. Over the counter 1/20/19   Jatinder Page MD   omeprazole (PRILOSEC) 20 mg capsule Take 20 mg by mouth daily. Provider, Historical   calcitRIOL (ROCALTROL) 0.5 mcg capsule Take 0.5 mcg by mouth daily. Provider, Historical   aspirin 81 mg chewable tablet Take 81 mg by mouth daily. Other, MD Morro       Allergies/Social/Family History:      Allergies   Allergen Reactions    Shellfish Containing Products Swelling     Break out in rash, trouble breathing    Contrast Agent [Iodine] Shortness of Breath    Heparin Analogues Other (comments)     Positive history of HIT      Social History     Tobacco Use    Smoking status: Never Smoker    Smokeless tobacco: Never Used   Substance Use Topics    Alcohol use: No      Family History   Problem Relation Age of Onset    COPD Mother     Lung Disease Mother     Cancer Father         stomach    Cancer Maternal Grandmother            Objective:   Vital Signs:  Visit Vitals  /77   Pulse (!) 105   Temp 98.1 °F (36.7 °C)   Resp 20   Ht 5' 7\" (1.702 m)   Wt 68.2 kg (150 lb 5.7 oz)   SpO2 94%   BMI 23.55 kg/m²    O2 Flow Rate (L/min): 2 l/min O2 Device: Ventilator Temp (24hrs), Av.8 °F (37.1 °C), Min:97.1 °F (36.2 °C), Max:100.8 °F (38.2 °C)           Intake/Output:     Intake/Output Summary (Last 24 hours) at 2021 1033  Last data filed at 2021 1000  Gross per 24 hour   Intake 1998.59 ml   Output 1910 ml   Net 88.59 ml       Physical Exam:  GEN: chronically ill-appearing, RASS -2, + F/C, synchronous with vent  HEENT: NCAT, sclerae white  NECK: No JVD noted  CHEST: Clear to auscultation and percussion  CARDIAC: sinus rhythm, regular, no murmur noted  ABD: les distended, softer, + tympani - less prominent, hypoactive BS  EXT: Symmetric LE edema  NEURO: Cranial nerves intact, symmetric strength, no focal deficits noted  DERM: No lesions noted    I have examined the patient on this day 2021 and the above documented exam is accurate including the components that have been copied forward    LABS AND  DATA: Personally reviewed  Recent Labs     21  0303 12/10/21  1806 12/10/21  0915 12/10/21  0915 12/10/21  0346 12/10/21  0346   WBC 28.7*  --   --   --   --  22.9*   HGB 7.7* 7.2*   < > 7.3*   < > 5.9*   HCT 23.9*  --   --  22.3*   < > 18.1*   PLT 54*  --   --   --   --  54*    < > = values in this interval not displayed.      Recent Labs     21  0303 12/10/21  1540   * 137   K 3.9 4.0    104   CO2 25 25   BUN 53* 54*   CREA 3.90* 4.27*   GLU 95 109*   CA 8.2* 7.3*   MG 2.0 2.1   PHOS 3.4 3.3     Recent Labs     12/10/21  0346 12/09/21  0410   * 121*   TP 4.4* 5.2*   ALB 1.8* 2.1*   GLOB 2.6 3.1     Recent Labs     12/09/21  0410   INR 1.0  1.1   PTP 11.2  11.9*   APTT 31      No results for input(s): PHI, PCO2I, PO2I, FIO2I in the last 72 hours. No results for input(s): CPK, CKMB, TROIQ, BNPP in the last 72 hours. Hemodynamics:   PAP:   CO:     Wedge:   CI:     CVP:    SVR:       PVR:       Ventilator Settings:  Mode Rate Tidal Volume Pressure FiO2 PEEP   CPAP   400 ml  6 cm H2O 30 % 5 cm H20     Peak airway pressure: 12 cm H2O    Minute ventilation: 12.4 l/min        MEDS: Reviewed    Chest X-Ray:  CXR Results  (Last 48 hours)    None          I have reviewed the above films and agree with official interpretation         Multidisciplinary Rounds Completed:  Yes      SPECIAL EQUIPMENT  CRRT    DISPOSITION  Stay in ICU    CRITICAL CARE CONSULTANT NOTE  I had a in-person encounter with Stef Carty, reviewed and interpreted patient data including events, labs, images, vital signs, I/O's, and examined patient. I have discussed the case and the plan and management of the patient's care with the consulting services, the bedside nurses and the respiratory therapist.      NOTE OF PERSONAL INVOLVEMENT IN CARE   This patient is at high risk for sudden and clinically significant deterioration, which requires the highest level of preparedness to intervene urgently. I participated in the decision-making and personally managed or directed the management of the following life and organ supporting interventions that required my frequent assessment to treat or prevent imminent deterioration. I personally spent 40 minutes of critical care time.   This is time spent at patient's bedside actively involved in patient care as well as the coordination of care and discussions with the patient's family. This does not include any procedural time which has been billed separately.     Melvin Batista MD  Pulmonary/[facility]  [phone]  12/11/2021

## 2021-12-11 NOTE — PROGRESS NOTES
Comprehensive Nutrition Assessment    Type and Reason for Visit: Reassess    Nutrition Recommendations/Plan:   · Continue TPN D15/5%AA @ 42 ml/hr which provides daily approx. 710 kcals (~41% est needs). 50 g protein. · Continue to increased TPN to goal per the following to better meet est needs as medically feasible:    Day 2) If BG <200mg/dL and lytes WNL, advance to 63mL/h               Day 3) If BG <200mg/dL and lytes WNL, advance to 83mL/h              Day 4) If BG <200mg/dL and lytes WNL, advance to Goal Rate of  100mL/h (provides 1704kcals/120gPro/360gDextrose)     Nutrition Assessment:     12/11: Chart reviewed; med noted for COVID, hx of CKD with kidney transplant x 2. Pt is on chronic HHD 5 days per week at home; currently CV[facility]. Noted for blood clots of the abdomen. Remains NPO. Remains on vent, propofol off this AM for SBT trial. Pt remains on TPN D15/5%AA @ 42 ml/hr which provides daily approx. 10 kcals/50 g protein. Last Weight Metric  Weight Loss Metrics 12/10/2021 11/17/2021 4/7/2021 3/27/2021 11/8/2020 10/20/2020 9/13/2020   Today's Wt 150 lb 5.7 oz 145 lb 12.8 oz 147 lb 4.3 oz 148 lb 9.4 oz 151 lb 4.8 oz 148 lb 13 oz 154 lb 5.2 oz   BMI 23.55 kg/m2 22.84 kg/m2 23.07 kg/m2 23.27 kg/m2 23.7 kg/m2 23.31 kg/m2 24.17 kg/m2     Estimated Daily Nutrient Needs:  Energy (kcal): PSU 1713 (MSJ 1566); Weight Used for Energy Requirements: Current  Protein (g): 86-143g (1.2-2gPro/kg); Weight Used for Protein Requirements: Current  Fluid (ml/day): 1500mL;  Method Used for Fluid Requirements: Standard renal    Nutrition Related Findings:  BM: 12/9; Meds: TPN [email or web address] ml/hr, levop, propofol stopped for SBT, roya stopped; Labs: Na+ 135, lytes stable, BG stable      Wounds:    None       Current Nutrition Therapies:  TPN ADULT - CENTRAL AA 5% D15% W/ ELECTROLYTES AND CA    Anthropometric Measures:  · Height:  5' 7\" (170.2 cm)  · Current Body Wt:  71.7 kg (158 lb 1.1 oz)    · Ideal Body Wt:  148 lbs:  94.1 % · Adjusted Body Weight:   ; Weight Adjustment for: No adjustment    · BMI Category:  Normal weight (BMI 18.5-24. 9)       Nutrition Diagnosis:   · Inadequate protein-energy intake related to renal dysfunction, increased demand for energy/nutrients (poor appetite) as evidenced by nutrition support-parenteral nutrition, NPO or clear liquid status due to medical condition    Nutrition Interventions:   Food and/or Nutrient Delivery: Continue NPO, Modify parenteral nutrition  Nutrition Education and Counseling: No recommendations at this time  Coordination of Nutrition Care: Continue to monitor while inpatient, Interdisciplinary rounds    Goals:  Pt will continue to tolerate TPN with stable lytes and BG and trend towards goal rate next 2-4 days       Nutrition Monitoring and Evaluation:   Behavioral-Environmental Outcomes: None identified  Food/Nutrient Intake Outcomes: Parenteral nutrition intake/tolerance  Physical Signs/Symptoms Outcomes: Biochemical data, GI status, Fluid status or edema, Weight    Discharge Planning:     Too soon to determine     Electronically signed by Alex Fuentes RD on 12/11/2021 at 8:21 AM

## 2021-12-12 LAB
ANION GAP SERPL CALC-SCNC: 7 MMOL/L (ref 5–15)
ANION GAP SERPL CALC-SCNC: 9 MMOL/L (ref 5–15)
B PERT DNA SPEC QL NAA+PROBE: NOT DETECTED
BASOPHILS # BLD: 0 K/UL (ref 0–0.1)
BASOPHILS NFR BLD: 0 % (ref 0–1)
BORDETELLA PARAPERTUSSIS PCR, BORPAR: NOT DETECTED
BUN SERPL-MCNC: 39 MG/DL (ref 6–20)
BUN SERPL-MCNC: 44 MG/DL (ref 6–20)
BUN/CREAT SERPL: 13 (ref 12–20)
BUN/CREAT SERPL: 13 (ref 12–20)
C PNEUM DNA SPEC QL NAA+PROBE: NOT DETECTED
CALCIUM SERPL-MCNC: 8.3 MG/DL (ref 8.5–10.1)
CALCIUM SERPL-MCNC: 8.4 MG/DL (ref 8.5–10.1)
CHLORIDE SERPL-SCNC: 104 MMOL/L (ref 97–108)
CHLORIDE SERPL-SCNC: 104 MMOL/L (ref 97–108)
CO2 SERPL-SCNC: 25 MMOL/L (ref 21–32)
CO2 SERPL-SCNC: 25 MMOL/L (ref 21–32)
CREAT SERPL-MCNC: 3.12 MG/DL (ref 0.7–1.3)
CREAT SERPL-MCNC: 3.32 MG/DL (ref 0.7–1.3)
DATE LAST DOSE: ABNORMAL
DIFFERENTIAL METHOD BLD: ABNORMAL
EOSINOPHIL # BLD: 0.2 K/UL (ref 0–0.4)
EOSINOPHIL NFR BLD: 1 % (ref 0–7)
ERYTHROCYTE [DISTWIDTH] IN BLOOD BY AUTOMATED COUNT: 19.5 % (ref 11.5–14.5)
FLUAV H1 2009 PAND RNA SPEC QL NAA+PROBE: NOT DETECTED
FLUAV H1 RNA SPEC QL NAA+PROBE: NOT DETECTED
FLUAV H3 RNA SPEC QL NAA+PROBE: NOT DETECTED
FLUAV SUBTYP SPEC NAA+PROBE: NOT DETECTED
FLUBV RNA SPEC QL NAA+PROBE: NOT DETECTED
GLUCOSE BLD STRIP.AUTO-MCNC: 100 MG/DL (ref 65–117)
GLUCOSE BLD STRIP.AUTO-MCNC: 104 MG/DL (ref 65–117)
GLUCOSE BLD STRIP.AUTO-MCNC: 105 MG/DL (ref 65–117)
GLUCOSE BLD STRIP.AUTO-MCNC: 89 MG/DL (ref 65–117)
GLUCOSE SERPL-MCNC: 105 MG/DL (ref 65–100)
GLUCOSE SERPL-MCNC: 119 MG/DL (ref 65–100)
HADV DNA SPEC QL NAA+PROBE: NOT DETECTED
HCOV 229E RNA SPEC QL NAA+PROBE: NOT DETECTED
HCOV HKU1 RNA SPEC QL NAA+PROBE: NOT DETECTED
HCOV NL63 RNA SPEC QL NAA+PROBE: NOT DETECTED
HCOV OC43 RNA SPEC QL NAA+PROBE: NOT DETECTED
HCT VFR BLD AUTO: 21.2 % (ref 36.6–50.3)
HCT VFR BLD AUTO: 23.2 % (ref 36.6–50.3)
HGB BLD-MCNC: 6.8 G/DL (ref 12.1–17)
HGB BLD-MCNC: 7.3 G/DL (ref 12.1–17)
HGB BLD-MCNC: 7.4 G/DL (ref 12.1–17)
HISTORY CHECKED?,CKHIST: NORMAL
HMPV RNA SPEC QL NAA+PROBE: NOT DETECTED
HPIV1 RNA SPEC QL NAA+PROBE: NOT DETECTED
HPIV2 RNA SPEC QL NAA+PROBE: NOT DETECTED
HPIV3 RNA SPEC QL NAA+PROBE: NOT DETECTED
HPIV4 RNA SPEC QL NAA+PROBE: NOT DETECTED
IMM GRANULOCYTES # BLD AUTO: 0.2 K/UL (ref 0–0.04)
IMM GRANULOCYTES NFR BLD AUTO: 1 % (ref 0–0.5)
LYMPHOCYTES # BLD: 0.7 K/UL (ref 0.8–3.5)
LYMPHOCYTES NFR BLD: 3 % (ref 12–49)
M PNEUMO DNA SPEC QL NAA+PROBE: NOT DETECTED
MAGNESIUM SERPL-MCNC: 2.5 MG/DL (ref 1.6–2.4)
MAGNESIUM SERPL-MCNC: 2.6 MG/DL (ref 1.6–2.4)
MCH RBC QN AUTO: 28.9 PG (ref 26–34)
MCHC RBC AUTO-ENTMCNC: 31.9 G/DL (ref 30–36.5)
MCV RBC AUTO: 90.6 FL (ref 80–99)
MONOCYTES # BLD: 1.4 K/UL (ref 0–1)
MONOCYTES NFR BLD: 6 % (ref 5–13)
NEUTS SEG # BLD: 20 K/UL (ref 1.8–8)
NEUTS SEG NFR BLD: 89 % (ref 32–75)
NRBC # BLD: 0.25 K/UL (ref 0–0.01)
NRBC BLD-RTO: 1.1 PER 100 WBC
PHOSPHATE SERPL-MCNC: 2.6 MG/DL (ref 2.6–4.7)
PHOSPHATE SERPL-MCNC: 2.6 MG/DL (ref 2.6–4.7)
PLATELET # BLD AUTO: 69 K/UL (ref 150–400)
PMV BLD AUTO: 12.6 FL (ref 8.9–12.9)
POTASSIUM SERPL-SCNC: 3.9 MMOL/L (ref 3.5–5.1)
POTASSIUM SERPL-SCNC: 3.9 MMOL/L (ref 3.5–5.1)
RBC # BLD AUTO: 2.56 M/UL (ref 4.1–5.7)
RBC MORPH BLD: ABNORMAL
REPORTED DOSE,DOSE: ABNORMAL UNITS
REPORTED DOSE/TIME,TMG: ABNORMAL
RSV RNA SPEC QL NAA+PROBE: NOT DETECTED
RV+EV RNA SPEC QL NAA+PROBE: NOT DETECTED
SARS-COV-2 PCR, COVPCR: DETECTED
SERVICE CMNT-IMP: NORMAL
SODIUM SERPL-SCNC: 136 MMOL/L (ref 136–145)
SODIUM SERPL-SCNC: 138 MMOL/L (ref 136–145)
VANCOMYCIN TROUGH SERPL-MCNC: 21.9 UG/ML (ref 5–10)
WBC # BLD AUTO: 22.5 K/UL (ref 4.1–11.1)

## 2021-12-12 PROCEDURE — 74011000250 HC RX REV CODE- 250: Performed by: INTERNAL MEDICINE

## 2021-12-12 PROCEDURE — 74011000250 HC RX REV CODE- 250: Performed by: HOSPITALIST

## 2021-12-12 PROCEDURE — 74011250637 HC RX REV CODE- 250/637: Performed by: INTERNAL MEDICINE

## 2021-12-12 PROCEDURE — 80202 ASSAY OF VANCOMYCIN: CPT

## 2021-12-12 PROCEDURE — 36430 TRANSFUSION BLD/BLD COMPNT: CPT

## 2021-12-12 PROCEDURE — 85014 HEMATOCRIT: CPT

## 2021-12-12 PROCEDURE — 85025 COMPLETE CBC W/AUTO DIFF WBC: CPT

## 2021-12-12 PROCEDURE — 74011000258 HC RX REV CODE- 258: Performed by: INTERNAL MEDICINE

## 2021-12-12 PROCEDURE — 84100 ASSAY OF PHOSPHORUS: CPT

## 2021-12-12 PROCEDURE — 36415 COLL VENOUS BLD VENIPUNCTURE: CPT

## 2021-12-12 PROCEDURE — 80048 BASIC METABOLIC PNL TOTAL CA: CPT

## 2021-12-12 PROCEDURE — 83735 ASSAY OF MAGNESIUM: CPT

## 2021-12-12 PROCEDURE — 77010033678 HC OXYGEN DAILY

## 2021-12-12 PROCEDURE — 65610000006 HC RM INTENSIVE CARE

## 2021-12-12 PROCEDURE — 82962 GLUCOSE BLOOD TEST: CPT

## 2021-12-12 PROCEDURE — C9113 INJ PANTOPRAZOLE SODIUM, VIA: HCPCS | Performed by: INTERNAL MEDICINE

## 2021-12-12 PROCEDURE — 2709999900 HC NON-CHARGEABLE SUPPLY

## 2021-12-12 PROCEDURE — 85018 HEMOGLOBIN: CPT

## 2021-12-12 PROCEDURE — P9016 RBC LEUKOCYTES REDUCED: HCPCS

## 2021-12-12 PROCEDURE — 90945 DIALYSIS ONE EVALUATION: CPT

## 2021-12-12 PROCEDURE — 74011250636 HC RX REV CODE- 250/636: Performed by: INTERNAL MEDICINE

## 2021-12-12 PROCEDURE — 74011250636 HC RX REV CODE- 250/636: Performed by: HOSPITALIST

## 2021-12-12 RX ORDER — SODIUM CHLORIDE 9 MG/ML
250 INJECTION, SOLUTION INTRAVENOUS AS NEEDED
Status: DISCONTINUED | OUTPATIENT
Start: 2021-12-12 | End: 2021-12-16 | Stop reason: ALTCHOICE

## 2021-12-12 RX ADMIN — FENTANYL CITRATE 25 MCG: 0.05 INJECTION, SOLUTION INTRAMUSCULAR; INTRAVENOUS at 22:35

## 2021-12-12 RX ADMIN — CALCIUM CHLORIDE, MAGNESIUM CHLORIDE, DEXTROSE MONOHYDRATE, LACTIC ACID, SODIUM CHLORIDE, SODIUM BICARBONATE AND POTASSIUM CHLORIDE: 3.68; 3.05; 22; 5.4; 6.46; 3.09; .314 INJECTION INTRAVENOUS at 04:00

## 2021-12-12 RX ADMIN — METOCLOPRAMIDE HYDROCHLORIDE 5 MG: 5 INJECTION INTRAMUSCULAR; INTRAVENOUS at 05:09

## 2021-12-12 RX ADMIN — PIPERACILLIN AND TAZOBACTAM 3.38 G: 3; .375 INJECTION, POWDER, LYOPHILIZED, FOR SOLUTION INTRAVENOUS at 10:21

## 2021-12-12 RX ADMIN — Medication 1 AMPULE: at 20:10

## 2021-12-12 RX ADMIN — METOCLOPRAMIDE HYDROCHLORIDE 5 MG: 5 INJECTION INTRAMUSCULAR; INTRAVENOUS at 14:20

## 2021-12-12 RX ADMIN — Medication 10 ML: at 05:03

## 2021-12-12 RX ADMIN — CALCIUM CHLORIDE, MAGNESIUM CHLORIDE, DEXTROSE MONOHYDRATE, LACTIC ACID, SODIUM CHLORIDE, SODIUM BICARBONATE AND POTASSIUM CHLORIDE: 3.68; 3.05; 22; 5.4; 6.46; 3.09; .314 INJECTION INTRAVENOUS at 07:01

## 2021-12-12 RX ADMIN — FENTANYL CITRATE 25 MCG: 0.05 INJECTION, SOLUTION INTRAMUSCULAR; INTRAVENOUS at 20:00

## 2021-12-12 RX ADMIN — CHLORHEXIDINE GLUCONATE 15 ML: 1.2 RINSE ORAL at 20:10

## 2021-12-12 RX ADMIN — PIPERACILLIN AND TAZOBACTAM 3.38 G: 3; .375 INJECTION, POWDER, LYOPHILIZED, FOR SOLUTION INTRAVENOUS at 17:28

## 2021-12-12 RX ADMIN — CHLORHEXIDINE GLUCONATE 15 ML: 1.2 RINSE ORAL at 08:22

## 2021-12-12 RX ADMIN — METOCLOPRAMIDE HYDROCHLORIDE 5 MG: 5 INJECTION INTRAMUSCULAR; INTRAVENOUS at 21:04

## 2021-12-12 RX ADMIN — CALCIUM CHLORIDE, MAGNESIUM CHLORIDE, DEXTROSE MONOHYDRATE, LACTIC ACID, SODIUM CHLORIDE, SODIUM BICARBONATE AND POTASSIUM CHLORIDE: 3.68; 3.05; 22; 5.4; 6.46; 3.09; .314 INJECTION INTRAVENOUS at 00:07

## 2021-12-12 RX ADMIN — Medication 1 AMPULE: at 08:21

## 2021-12-12 RX ADMIN — Medication 10 ML: at 14:20

## 2021-12-12 RX ADMIN — VANCOMYCIN HYDROCHLORIDE 1000 MG: 1 INJECTION, POWDER, LYOPHILIZED, FOR SOLUTION INTRAVENOUS at 08:35

## 2021-12-12 RX ADMIN — SODIUM CHLORIDE 40 MG: 9 INJECTION INTRAMUSCULAR; INTRAVENOUS; SUBCUTANEOUS at 20:11

## 2021-12-12 RX ADMIN — CALCIUM CHLORIDE, MAGNESIUM CHLORIDE, DEXTROSE MONOHYDRATE, LACTIC ACID, SODIUM CHLORIDE, SODIUM BICARBONATE AND POTASSIUM CHLORIDE: 3.68; 3.05; 22; 5.4; 6.46; 3.09; .314 INJECTION INTRAVENOUS at 13:30

## 2021-12-12 RX ADMIN — SODIUM CHLORIDE 40 MG: 9 INJECTION INTRAMUSCULAR; INTRAVENOUS; SUBCUTANEOUS at 08:22

## 2021-12-12 RX ADMIN — ASCORBIC ACID, VITAMIN A PALMITATE, CHOLECALCIFEROL, THIAMINE HYDROCHLORIDE, RIBOFLAVIN-5 PHOSPHATE SODIUM, PYRIDOXINE HYDROCHLORIDE, NIACINAMIDE, DEXPANTHENOL, ALPHA-TOCOPHEROL ACETATE, VITAMIN K1, FOLIC ACID, BIOTIN, CYANOCOBALAMIN: 200; 3300; 200; 6; 3.6; 6; 40; 15; 10; 150; 600; 60; 5 INJECTION, SOLUTION INTRAVENOUS at 17:50

## 2021-12-12 RX ADMIN — CALCIUM CHLORIDE, MAGNESIUM CHLORIDE, DEXTROSE MONOHYDRATE, LACTIC ACID, SODIUM CHLORIDE, SODIUM BICARBONATE AND POTASSIUM CHLORIDE: 3.68; 3.05; 22; 5.4; 6.46; 3.09; .314 INJECTION INTRAVENOUS at 10:21

## 2021-12-12 RX ADMIN — CALCIUM CHLORIDE, MAGNESIUM CHLORIDE, DEXTROSE MONOHYDRATE, LACTIC ACID, SODIUM CHLORIDE, SODIUM BICARBONATE AND POTASSIUM CHLORIDE: 3.68; 3.05; 22; 5.4; 6.46; 3.09; .314 INJECTION INTRAVENOUS at 21:30

## 2021-12-12 RX ADMIN — PIPERACILLIN AND TAZOBACTAM 3.38 G: 3; .375 INJECTION, POWDER, LYOPHILIZED, FOR SOLUTION INTRAVENOUS at 01:14

## 2021-12-12 RX ADMIN — CALCIUM CHLORIDE, MAGNESIUM CHLORIDE, DEXTROSE MONOHYDRATE, LACTIC ACID, SODIUM CHLORIDE, SODIUM BICARBONATE AND POTASSIUM CHLORIDE: 3.68; 3.05; 22; 5.4; 6.46; 3.09; .314 INJECTION INTRAVENOUS at 16:30

## 2021-12-12 RX ADMIN — Medication 10 ML: at 21:04

## 2021-12-12 NOTE — PROGRESS NOTES
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 He was admitted to Hospitalist Service with Acute COVID infection, mild hypoxemic respiratory failure, recent strep intermedius bacteremia. Transferred to ICU  after developing hematemesis and shock. PRINCIPLE ICU DIAGNOSIS:  Hemorrhagic shock due to UGIB    Hospital course/major results:   Admission as above   Transferred to ICU for hematemesis, shock. Intubated semi-electively for EGD. Required multiple units RBC, FFP, plts. EGD revealed small distal esophageal ulcers and large volume of blood and clots in stomach without clear source of bleeding identified. Required vasopressors. Continued blood loss into night requiring more blood products.  CTAP: 1. There is a large amount of hemorrhage within a distended stomach, extending into the duodenum. An active bleed is not seen on this examination. However, delayed images were not performed, somewhat limiting the study. 2. Bilateral pleural effusions with bilateral pneumonia.  L femoral HD catheter palced and CRRT initiated   Remains intubated and on CRRT. Further bleeding with Hgb 6.5. Received one unit RBCs. Repeat EGD: Severe distal esophageal ulceration which is likely cause of bleeding, 2. Stomach full of blood---mucosa not well seen 3. Duodenum is normal. Worsening abdominal distention. Repeat CTAP: Generalized gaseous distention of large and small bowel. Findings are consistent with a generalized ileus. 2. No evidence of bowel perforation. 3. Continued gastric distention with hemorrhagic material, similar to prior study. 4. NG tube in the stomach.  5. Covid 19 pneumonia has worsened in the visualized lung bases. 6. Continued small to moderate bilateral pleural effusions. 12/08 General Surgery consultation  12/08 Palliative Care consultation requested  12/09 Stabilizing. Vasopressor requirements improving. Abdomen exam much improved - softer, less distended. TPN initiated  12/10: Patient remains intubated and sedated, on minimal vent settings. Still has ongoing melena. Transfused one U of blood this morning. Dialysis catheter clotted overnight. 12/11: Patient remains intubated, able to follow commands. On SBT. No acute events overnight. 12/12: Patient reports feeling better today. He was extubated yesterday, on 4lit oxygen by NC, on CRRT. Lines/tubes/devices:  ETT 12/06 >>   R femoral CVL 12/06 >>   L femoral HD catheter 12/07 >>       COMPREHENSIVE CCM ASSESSMENT/PLAN (by systems)       PULMONARY:  1. Vent dep respiratory failure - intubated for EGD  2. B pulmonary infiltrates - likely aspiration/COVID pneumonia. PLAN   - Extubated on 12/11.  - Supplemental oxygen for goal sat of 88-92%. - treated with dexamethasone. On zosyn for possible aspiration pneumonia. -Repeat COVID test in 12/11 is positive again. CARDIOVASCULAR/HEMODYNAMIC:  3. Shock - hemorrhagic       PLAN  - cardiac/hemodynamic monitoring  - MAP goal > 60 mmHg  - SBP goal > 100 mmHg      RENAL:  4. ESRD  5. Mild metabolic acidosis  6. Mild hyperkalemia    Current RRT:     PLAN  - Monitor chemistry panel daily  - Correct electrolytes as indicated  - Nephrology following  - CRRT per Nephrology - initiated 12/07    GI/NUTRITION:  7. Acute UGIB  8. Distal esophageal ulceration  9. Protein-calorie malnutrition      Current nutritional support: none  SUP: high dose IV PPI    PLAN  - Cont high dose PPI  - Gastroenterology following - their assistance appreciated  - General Surgery consultation 12/08 - no indication for surgery  - Initiate TPN 12/09      INFECTIOUS DISEASE  10.  Suspect aspiration PNA  11. Severe sepsis    Micro:  Resp 12/07 >>    Antibiotics:  Pip-tazo 12/07 >>     PLAN  - Follow culture results to completion  - Duration of antimicrobial therapy TBD  - Track PCT    HEME  12. ABLA  13. Thrombocytopenia  14. H/O DVT on chronic warfarin - on hold for now due to GIB. 15. H/o HIT. DVT prophylaxis: SCDs - has IVC filter    PLAN  - Daily CBC  - Transfuse as needed to maintain Hgb > 7.0 gm/dL or for hemodynamically significant bleeding  - Transfuse plts to maintain > 50k  - Transfuse FFP to maintain INR < 1.5  - Holding warfarin    NEURO  16. Cognition fully intact prior to admission  17. ICU/vent associated discomfort      RASS goal: -1, -2  Current sedatives: propofol  Current analgesics: fentanyl    PLAN   - Daily SAT when meets ICU criteria  - ABCDEF mobility bundle  - PT/OT involvement when appropriate      ENDOCRINE  18. Mild hyperglycemia without prior dx of DM    PLAN  - Target glucose: 100-180  - Glycemic control: N/I presently    GOALS OF CARE/ADVANCED DIRECTIVES  19. CODE STATUS: Full   20. HPI/Consult/Subjective:   24 Hr Events 12/12/2021:   As above    SUBJ:   Reports feeling better, no acute events overnight. Past Medical History:      has a past medical history of Abdominal hematoma, AICD (automatic cardioverter/defibrillator) present, CAD (coronary artery disease), Chronic abdominal pain, Chronic kidney disease, DVT (deep venous thrombosis) ([facility] [address] (2001), DVT of popliteal vein ([facility] [address] (11/2011), Endocarditis, Gallstone pancreatitis, Gastrointestinal disorder, Gastrointestinal disorder, Hemodialysis patient ([address] High cholesterol, Hypertension, Kidney transplant, Long term current use of anticoagulant therapy, Nephrotic syndrome, Other ill-defined conditions(799.89), Other ill-defined conditions(799.89), Other ill-defined conditions(799.89), Peritonitis ([address] Seizures ([address] (2015), Small bowel obstruction ([address] 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[address] and V-tach ([address]     Past Surgical History:      has a past surgical history that includes pr edg us exam surgical alter stom duodenum/jejunum (7/15/2011); pr esophagogastroduodenoscopy transoral diagnostic (5/24/2012); hx cholecystectomy; hx appendectomy; hx small bowel resection; hx other surgical (11/2011); hx other surgical; hx other surgical (02/2013); hx transplant (2001 ); hx transplant (1992); vascular surgery procedure unlist (11/14/2017); hx pacemaker (Left, 6/2009); hx pacemaker (Left); pr cardiac surg procedure unlist (2007); hx vascular access (Right); ir replace cvc tunneled w/o port (9/10/2020); ir remove tunl cvad w/o port / pump (10/29/2020); upper gi endoscopy,ctrl bleed (12/6/2021); ir insert non tunl cvc over 5 yrs (12/7/2021); upper gi endoscopy,diagnosis (12/8/2021); and ir insert non tunl cvc over 5 yrs (12/10/2021). Home Medications:     Prior to Admission medications    Medication Sig Start Date End Date Taking? Authorizing Provider   ondansetron (Zofran ODT) 4 mg disintegrating tablet 1 Tab by SubLINGual route every eight (8) hours as needed for Nausea or Vomiting. 3/27/21   Emperatriz Castro MD   metoprolol succinate (TOPROL-XL) 25 mg XL tablet Take 0.5 Tabs by mouth daily. 11/10/20   Guerda Tripp MD   calcium acetate,phosphat bind, (PHOSLO) 667 mg cap Take 2 Caps by mouth three (3) times daily (with meals). Indications: low amount of calcium in the blood, renal osteodystrophy with hyperphosphatemia 11/10/20   Guerda Tripp MD   atorvastatin (Lipitor) 20 mg tablet Take 20 mg by mouth daily. Provider, Historical   warfarin (COUMADIN) 2.5 mg tablet Take 2.5 mg by mouth daily. Provider, Historical   acetaminophen (TYLENOL) 500 mg tablet Take 1 Tab by mouth every four (4) hours as needed for Pain. Over the counter 1/20/19   Joe Elizondo MD   omeprazole (PRILOSEC) 20 mg capsule Take 20 mg by mouth daily. Provider, Historical   calcitRIOL (ROCALTROL) 0.5 mcg capsule Take 0.5 mcg by mouth daily.     Provider, Historical   aspirin 81 mg chewable tablet Take 81 mg by mouth daily. Other, MD Morro       Allergies/Social/Family History: Allergies   Allergen Reactions    Shellfish Containing Products Swelling     Break out in rash, trouble breathing    Contrast Agent [Iodine] Shortness of Breath    Heparin Analogues Other (comments)     Positive history of HIT      Social History     Tobacco Use    Smoking status: Never Smoker    Smokeless tobacco: Never Used   Substance Use Topics    Alcohol use: No      Family History   Problem Relation Age of Onset    COPD Mother     Lung Disease Mother     Cancer Father         stomach    Cancer Maternal Grandmother            Objective:   Vital Signs:  Visit Vitals  /73   Pulse (!) 112   Temp 98.5 °F (36.9 °C)   Resp 27   Ht 5' 7\" (1.702 m)   Wt 68.2 kg (150 lb 5.7 oz)   SpO2 96%   BMI 23.55 kg/m²    O2 Flow Rate (L/min): 4 l/min O2 Device: Nasal cannula Temp (24hrs), Av.3 °F (36.8 °C), Min:98 °F (36.7 °C), Max:98.5 °F (36.9 °C)           Intake/Output:     Intake/Output Summary (Last 24 hours) at 2021 1042  Last data filed at 2021 1000  Gross per 24 hour   Intake 1776.08 ml   Output 3719 ml   Net -1942.92 ml       Physical Exam:  GEN: chronically ill-appearing,   HEENT: NCAT, sclerae white  NECK: No JVD noted  CHEST: no resp distress, on NC oxygen.   CARDIAC: sinus rhythm, regular, no murmur noted  ABD: les distended, softer, + tympani - less prominent, hypoactive BS  EXT: Symmetric LE edema  NEURO: Cranial nerves intact, symmetric strength, no focal deficits noted  DERM: No lesions noted    I have examined the patient on this day 2021 and the above documented exam is accurate including the components that have been copied forward    LABS AND  DATA: Personally reviewed  Recent Labs     21  0459 21  2136 21  1204 21  0303   WBC 22.5*  --   --  28.7*   HGB 7.4* 7.5*   < > 7.7*   HCT 23.2*  --   --  23.9*   PLT 69*  --   -- 54*    < > = values in this interval not displayed. Recent Labs     12/12/21  0459 12/11/21  2136    136   K 3.9 3.9    103   CO2 25 25   BUN 44* 46*   CREA 3.32* 3.64*   * 110*   CA 8.3* 8.2*   MG 2.5* 2.2   PHOS 2.6 2.8     Recent Labs     12/10/21  0346   *   TP 4.4*   ALB 1.8*   GLOB 2.6     No results for input(s): INR, PTP, APTT, INREXT, INREXT in the last 72 hours. No results for input(s): PHI, PCO2I, PO2I, FIO2I in the last 72 hours. No results for input(s): CPK, CKMB, TROIQ, BNPP in the last 72 hours. Hemodynamics:   PAP:   CO:     Wedge:   CI:     CVP:    SVR:       PVR:       Ventilator Settings:  Mode Rate Tidal Volume Pressure FiO2 PEEP   CPAP   400 ml  6 cm H2O 30 % 5 cm H20     Peak airway pressure: 12 cm H2O    Minute ventilation: 12.4 l/min        MEDS: Reviewed    Chest X-Ray:  CXR Results  (Last 48 hours)    None          I have reviewed the above films and agree with official interpretation         Multidisciplinary Rounds Completed:  Yes      SPECIAL EQUIPMENT  CRRT    DISPOSITION  Stay in ICU    CRITICAL CARE CONSULTANT NOTE  I had a in-person encounter with Harsha Birch, reviewed and interpreted patient data including events, labs, images, vital signs, I/O's, and examined patient. I have discussed the case and the plan and management of the patient's care with the consulting services, the bedside nurses and the respiratory therapist.      NOTE OF PERSONAL INVOLVEMENT IN CARE   This patient is at high risk for sudden and clinically significant deterioration, which requires the highest level of preparedness to intervene urgently. I participated in the decision-making and personally managed or directed the management of the following life and organ supporting interventions that required my frequent assessment to treat or prevent imminent deterioration. I personally spent 40 minutes of critical care time.   This is time spent at patient's bedside actively involved in patient care as well as the coordination of care and discussions with the patient's family. This does not include any procedural time which has been billed separately.     Franklin Hernandez MD  Pulmonary/CCM  [phone]  [phone]  12/12/2021

## 2021-12-12 NOTE — ROUTINE PROCESS
1930: Bedside and Verbal shift change report received from [facility] (offgoing nurse). Report included the following information SBAR, Kardex, ED Summary, Procedure Summary, Intake/Output, MAR, Accordion, Recent Results, Med Rec Status, Cardiac Rhythm sinus tachycardia, Alarm Parameters  and Quality Measures. He is awake, pleasant and cooperative with his plan of care. Noted watching television on his phone. No c/o pain. CVVHD remians in-use. 2030:Filter clotted, I was able to return 165 ml's of the blood back without any challenges with the catheter. 2200:CVvHD restarted, he is awake without any challenges at this time. 0000:Resting at short intervals with his eyes closed, otherwise, he is easy to arouse with no c/o pain. 0200:Status is unchanged. 0400:Noted awake, pleasant and cooperative. Although he has a non-productive cough, no respiratory distress. 0600: Finally asleep, tolerating the CVVHD well. VSS   0730: Bedside and Verbal shift change report given to SEBASTIAN Cortez RN (oncoming nurse) by myself (offgoing nurse). Report included the following information SBAR, Kardex, ED Summary, Procedure Summary, Intake/Output, MAR, Accordion, Recent Results, Med Rec Status, Cardiac Rhythm sinus tachycardia, Alarm Parameters  and Quality Measures.

## 2021-12-12 NOTE — PROGRESS NOTES
[address]  YOB: 1975          Assessment & Plan:   ESRD on HHD, recently ICHD for Abx ([facility])  COVID + PNA  Shock  Anemia  CAD  History of HIT    Rec:  No issues with CRRT filter overnight  Cont factor of 100/hr  Serial labs  4K dialysate       Subjective:   CC: f/u ESRD  HPI: seen outside room. On CRRT, factor of 100/hr,. Off pressors.   No issues with filter overnight    ROS: unable to obtain due to pt condition  Current Facility-Administered Medications   Medication Dose Route Frequency    TPN ADULT - CENTRAL AA 5% D15% W/ ELECTROLYTES AND CA   IntraVENous CONTINUOUS    [START ON 12/13/2021] vancomycin (VANCOCIN) 750 mg in 0.9% sodium chloride 250 mL (VIAL-MATE)  750 mg IntraVENous Q24H    [START ON 12/13/2021] Vancomycin Trough Draw Reminder Note  1 Each Other ONCE    bicarbonate dialysis (PRISMASOL) BG K 4/Ca 2.5 5000 ml solution   Extracorporeal DIALYSIS CONTINUOUS    TPN ADULT - CENTRAL AA 5% D15% W/ ELECTROLYTES AND CA   IntraVENous CONTINUOUS    epoetin emilie-epbx (RETACRIT) injection 20,000 Units  20,000 Units SubCUTAneous Q TUE, THU & SAT    glucagon (GLUCAGEN) injection 1 mg  1 mg IntraMUSCular PRN    insulin lispro (HUMALOG) injection   SubCUTAneous Q6H    dextrose (D50W) injection syrg 12.5-25 g  12.5-25 g IntraVENous PRN    NOREPINephrine (LEVOPHED) 8 mg in 5% dextrose 250mL (32 mcg/mL) infusion  0.5-30 mcg/min IntraVENous TITRATE    fentaNYL citrate (PF) injection 25 mcg  25 mcg IntraVENous Q2H PRN    simethicone (MYLICON) 38MR/1.2UH oral drops 80 mg  1.2 mL Oral Multiple    [Held by provider] calcium acetate(phosphat bind) (PHOSLO) capsule 1,334 mg  2 Capsule Per NG tube TID WITH MEALS    metoclopramide HCl (REGLAN) injection 5 mg  5 mg IntraVENous Q8H    albuterol-ipratropium (DUO-NEB) 2.5 MG-0.5 MG/3 ML  3 mL Nebulization Q4H PRN    piperacillin-tazobactam (ZOSYN) 3.375 g in 0.9% sodium chloride (MBP/ADV) 100 mL MBP  3.375 g IntraVENous Q8H    pantoprazole (PROTONIX) 40 mg in 0.9% sodium chloride 10 mL injection  40 mg IntraVENous Q12H    Warfarin- Hold until bleeding has resolved   Other Rx Dosing/Monitoring    albumin human 25% (BUMINATE) solution 25 g  25 g IntraVENous DIALYSIS PRN    alcohol 62% (NOZIN) nasal  1 Ampule  1 Ampule Topical Q12H    chlorhexidine (PERIDEX) 0.12 % mouthwash 15 mL  15 mL Oral Q12H    acetaminophen (TYLENOL) tablet 650 mg  650 mg Oral Q6H PRN    [Held by provider] L.acidophilus-paracasei-S.thermophil-bifidobacter (RISAQUAD) 8 billion cell capsule  1 Capsule Oral DAILY    sodium chloride (NS) flush 5-40 mL  5-40 mL IntraVENous Q8H    sodium chloride (NS) flush 5-40 mL  5-40 mL IntraVENous PRN    polyethylene glycol (MIRALAX) packet 17 g  17 g Oral DAILY PRN    ondansetron (ZOFRAN) injection 4 mg  4 mg IntraVENous Q6H PRN          Objective:     Vitals:  Blood pressure 114/73, pulse 86, temperature 98.5 °F (36.9 °C), resp. rate 21, height 5' 7\" (1.702 m), weight 68.2 kg (150 lb 5.7 oz), SpO2 100 %. Temp (24hrs), Av.3 °F (36.8 °C), Min:98 °F (36.7 °C), Max:98.5 °F (36.9 °C)      Intake and Output:   07 - 1900  In: 382.3 [I.V.:382.3]  Out: 497   12/10 1901 -  0700  In: 2933.4 [I.V.:2933.4]  Out: 9507     Physical Exam:               Evaluated from outside room due to covid isolation        ECG/rhythm:    Data Review      No results for input(s): TNIPOC in the last 72 hours. No lab exists for component: ITNL   No results for input(s): CPK, CKMB, TROIQ in the last 72 hours.   Recent Labs     21  0459 21  2136 21  1204 21  0303 21  0303 12/10/21  1540 12/10/21  0915 12/10/21  0346 12/10/21  0346    136  --   --  135*   < >  --   --  138   K 3.9 3.9  --   --  3.9   < >  --   --  4.0    103  --   --  102   < >  --   --  105   CO2 25 25  --   --  25   < >  --   --  24   BUN 44* 46* --   --  53*   < >  --   --  50*   CREA 3.32* 3.64*  --   --  3.90*   < >  --   --  3.70*   * 110*  --   --  95   < >  --   --  108*   PHOS 2.6 2.8  --   --  3.4   < >  --   --  3.2   MG 2.5* 2.2  --   --  2.0   < >  --   --  2.2   CA 8.3* 8.2*  --   --  8.2*   < >  --   --  7.4*   ALB  --   --   --   --   --   --   --   --  1.8*   WBC 22.5*  --   --   --  28.7*  --   --   --  22.9*   HGB 7.4* 7.5* 7.8*   < > 7.7*   < > 7.3*   < > 5.9*   HCT 23.2*  --   --   --  23.9*  --  22.3*   < > 18.1*   PLT 69*  --   --   --  54*  --   --   --  54*    < > = values in this interval not displayed. No results for input(s): INR, PTP, APTT, INREXT, INREXT in the last 72 hours. Needs: urine analysis, urine sodium, protein and creatinine  Lab Results   Component Value Date/Time    Sodium,urine random 115 07/14/2011 07:20 PM    Creatinine, urine 52.2 07/14/2011 07:20 PM           : Faisal Sylvester MD  12/12/2021        [facility]:  [email or web address]  Jennifer Díaz office:  [address] [facility], [address] [facility] [address]  [place], [address] [facility]  Phone: [phone]  Fax :     [phone]    [facility] office:  [address] [facility], [address]  Phone - [phone]  Fax - [phone]

## 2021-12-12 NOTE — PROGRESS NOTES
0700  Bedside shift change report received from [place] (offgoing nurse). Assumed care of pt at this time. Report included the following information SBAR, Kardex, Intake/Output, MAR, Recent Results, Heart rhythm and Medications. CVVHD running at 150 ml's/hr (factor 100). 0830  Vanc Trough drawn. 2675  Vanc dose started V.    0930  Seen by Dr. Karine Polk. Fentanyl d/c'd    1030  Cleaned of dark green pasty stool and turned. 1200    Refused mouth care. Nephrology in to see, \"Con't current treatment. \"    [id number]  HD nurse in to see. Filter change in progress. 1400  CHG bath given and linen changed per PCT. Still refusing mouth care swabs. 1600  Assisted turning to right side. Refused mouth care. 1800  Resting with eyes closed.      1915  Report to [place], [place]

## 2021-12-12 NOTE — DIALYSIS
CRRT / [phone]         Orders   Mode: CVVHD restarted @ 1350   Blood Flow Rate: 180 ml/min to optimize pressures LINES reversed   Prismasol Dose: 20 ml/kg/hr x 68 kg = 1360, Rounded to 1350 ml/hr Dialysate   Prismasol Concentrate: 4K/2.5Ca   Blood Warmer Temp: 37*C   Net Fluid Removal:  ml/hr as tolerated          Metrics   BP: 103/69   HR: 121   Access Pressure: -85 (reversed)   Filter Pressure: 105   Return Pressure: 60   TMP: 25   Pressure Drop: 13          Access   Type & Location: L femoral CVC   Comments: dressing change per policy dressing dated, suture intact at insertion site, cuff exposed marked on outside of dressing, nurse notified and will monitor. CDI. +aspiration sluggish arterial lines reversed /+flush x2 ports, lines reversed.          Labs   HBsAg (Antigen) / date: Negative 12/1/21      HBsAb (Antibody) / date: Immune 11/11/21   Source: [facility]          Safety:   Time Out Done:   (Time) 1350   Consent obtained/signed: Verified   Education: CVC infection prevention & control. Primary Nurse Rpt Pre: Chayito Stephen RN    Primary Nurse Rpt Post: Chayito Stephen RN       Comments / Plan:   Filter changed secondary to increasing pressures with a TMP of 298 and and PD of 198. Blood returned 165 ml. Consents, patient, code status, labs and orders verified. Old set discarded in red bio-hazard bag. New [id number] filter set-up, primed, tested and running well at this time. L femoral CVC, dressing CDI with gauze & bio-patch dated 12/12/21. No signs of redness, drainage, or infection visualized. Each catheter limb disinfected for 60 seconds per limb with alcohol swabs. Caps removed, dialysis CVC hub scrubbed with [employer] for 15 seconds, followed by a 5 second dry time per Hospital P&P. +aspiration/+flush x 2 ports. Lines REVERSED, visible and connections secure with blood warmer to return line at 37*C. Education & pre/post report given to RN.

## 2021-12-12 NOTE — DIALYSIS
CRRT / [phone]         Orders   Mode: CVVHD restarted @ 2155   Blood Flow Rate: 180 ml/min to optimize pressures   Prismasol Dose: 20 ml/kg/hr x 68 kg = 1360, Rounded to 1350 ml/hr Dialysate   Prismasol Concentrate: 4K/2.5Ca   Blood Warmer Temp: 37*C   Net Fluid Removal:  ml/hr as tolerated          Metrics   BP: 109/69   HR: 113   Access Pressure: -79 (reversed)   Filter Pressure: 143   Return Pressure: 111    TMP: 18   Pressure Drop: 25          Access   Type & Location: L femoral CVC   Comments: dressing CDI. +aspiration/+flush x2 ports, lines reversed.          Labs   HBsAg (Antigen) / date: Negative 12/1/21      HBsAb (Antibody) / date: Immune 11/11/21   Source: [facility]          Safety:   Time Out Done:   (Time) 2150   Consent obtained/signed: Verified   Education: CVC infection prevention & control. Primary Nurse Rpt Pre: Chantel Amor RN   Primary Nurse Rpt Post: Chantel Amor RN      Comments / Plan:   Filter changed secondary to clotting. Primary RN was able to return blood to patient (~165ml). Consents, patient, code status, labs and orders verified. Old set discarded in red bio-hazard bag. New [id number] filter set-up, primed, tested and running well at this time. L femoral CVC, dressing CDI with gauze & bio-patch. No signs of redness, drainage, or infection visualized. Each catheter limb disinfected for 60 seconds per limb with alcohol swabs. Caps removed, dialysis CVC hub scrubbed with [employer] for 15 seconds, followed by a 5 second dry time per Hospital P&P. +aspiration/+flush x 2 ports. Lines REVERSED, visible and connections secure with blood warmer to return line at 37*C. Education & pre/post report given to RN.

## 2021-12-13 LAB
ABO + RH BLD: NORMAL
ALBUMIN SERPL-MCNC: 1.8 G/DL (ref 3.5–5)
ALBUMIN/GLOB SERPL: 0.5 {RATIO} (ref 1.1–2.2)
ALP SERPL-CCNC: 147 U/L (ref 45–117)
ALT SERPL-CCNC: 76 U/L (ref 12–78)
ANION GAP SERPL CALC-SCNC: 5 MMOL/L (ref 5–15)
ANION GAP SERPL CALC-SCNC: 7 MMOL/L (ref 5–15)
AST SERPL-CCNC: 46 U/L (ref 15–37)
BASOPHILS # BLD: 0 K/UL (ref 0–0.1)
BASOPHILS NFR BLD: 0 % (ref 0–1)
BILIRUB DIRECT SERPL-MCNC: 0.7 MG/DL (ref 0–0.2)
BILIRUB SERPL-MCNC: 1.1 MG/DL (ref 0.2–1)
BLD PROD TYP BPU: NORMAL
BLD PROD TYP BPU: NORMAL
BLOOD GROUP ANTIBODIES SERPL: NORMAL
BPU ID: NORMAL
BPU ID: NORMAL
BUN SERPL-MCNC: 39 MG/DL (ref 6–20)
BUN SERPL-MCNC: 43 MG/DL (ref 6–20)
BUN/CREAT SERPL: 13 (ref 12–20)
BUN/CREAT SERPL: 13 (ref 12–20)
CALCIUM SERPL-MCNC: 8.2 MG/DL (ref 8.5–10.1)
CALCIUM SERPL-MCNC: 8.2 MG/DL (ref 8.5–10.1)
CHLORIDE SERPL-SCNC: 106 MMOL/L (ref 97–108)
CHLORIDE SERPL-SCNC: 107 MMOL/L (ref 97–108)
CO2 SERPL-SCNC: 25 MMOL/L (ref 21–32)
CO2 SERPL-SCNC: 27 MMOL/L (ref 21–32)
CREAT SERPL-MCNC: 2.97 MG/DL (ref 0.7–1.3)
CREAT SERPL-MCNC: 3.36 MG/DL (ref 0.7–1.3)
CROSSMATCH RESULT,%XM: NORMAL
CROSSMATCH RESULT,%XM: NORMAL
DATE LAST DOSE: ABNORMAL
DIFFERENTIAL METHOD BLD: ABNORMAL
EOSINOPHIL # BLD: 0.3 K/UL (ref 0–0.4)
EOSINOPHIL NFR BLD: 2 % (ref 0–7)
ERYTHROCYTE [DISTWIDTH] IN BLOOD BY AUTOMATED COUNT: 19.1 % (ref 11.5–14.5)
GLOBULIN SER CALC-MCNC: 3.7 G/DL (ref 2–4)
GLUCOSE BLD STRIP.AUTO-MCNC: 106 MG/DL (ref 65–117)
GLUCOSE BLD STRIP.AUTO-MCNC: 107 MG/DL (ref 65–117)
GLUCOSE BLD STRIP.AUTO-MCNC: 91 MG/DL (ref 65–117)
GLUCOSE BLD STRIP.AUTO-MCNC: 99 MG/DL (ref 65–117)
GLUCOSE SERPL-MCNC: 103 MG/DL (ref 65–100)
GLUCOSE SERPL-MCNC: 99 MG/DL (ref 65–100)
HCT VFR BLD AUTO: 22.7 % (ref 36.6–50.3)
HGB BLD-MCNC: 7.2 G/DL (ref 12.1–17)
HGB BLD-MCNC: 7.4 G/DL (ref 12.1–17)
HGB BLD-MCNC: 8 G/DL (ref 12.1–17)
IMM GRANULOCYTES # BLD AUTO: 0.2 K/UL (ref 0–0.04)
IMM GRANULOCYTES NFR BLD AUTO: 1 % (ref 0–0.5)
LYMPHOCYTES # BLD: 0.6 K/UL (ref 0.8–3.5)
LYMPHOCYTES NFR BLD: 4 % (ref 12–49)
MAGNESIUM SERPL-MCNC: 2.4 MG/DL (ref 1.6–2.4)
MAGNESIUM SERPL-MCNC: 2.6 MG/DL (ref 1.6–2.4)
MCH RBC QN AUTO: 29.5 PG (ref 26–34)
MCHC RBC AUTO-ENTMCNC: 32.6 G/DL (ref 30–36.5)
MCV RBC AUTO: 90.4 FL (ref 80–99)
MONOCYTES # BLD: 1.3 K/UL (ref 0–1)
MONOCYTES NFR BLD: 8 % (ref 5–13)
NEUTS SEG # BLD: 13.3 K/UL (ref 1.8–8)
NEUTS SEG NFR BLD: 85 % (ref 32–75)
NRBC # BLD: 0.18 K/UL (ref 0–0.01)
NRBC BLD-RTO: 1.1 PER 100 WBC
PHOSPHATE SERPL-MCNC: 2.5 MG/DL (ref 2.6–4.7)
PHOSPHATE SERPL-MCNC: 2.9 MG/DL (ref 2.6–4.7)
PLATELET # BLD AUTO: 66 K/UL (ref 150–400)
POTASSIUM SERPL-SCNC: 3.9 MMOL/L (ref 3.5–5.1)
POTASSIUM SERPL-SCNC: 4 MMOL/L (ref 3.5–5.1)
PROT SERPL-MCNC: 5.5 G/DL (ref 6.4–8.2)
RBC # BLD AUTO: 2.51 M/UL (ref 4.1–5.7)
RBC MORPH BLD: ABNORMAL
RBC MORPH BLD: ABNORMAL
REPORTED DOSE,DOSE: ABNORMAL UNITS
REPORTED DOSE/TIME,TMG: ABNORMAL
SERVICE CMNT-IMP: NORMAL
SODIUM SERPL-SCNC: 138 MMOL/L (ref 136–145)
SODIUM SERPL-SCNC: 139 MMOL/L (ref 136–145)
SPECIMEN EXP DATE BLD: NORMAL
STATUS OF UNIT,%ST: NORMAL
STATUS OF UNIT,%ST: NORMAL
UNIT DIVISION, %UDIV: 0
UNIT DIVISION, %UDIV: 0
VANCOMYCIN TROUGH SERPL-MCNC: 22.3 UG/ML (ref 5–10)
WBC # BLD AUTO: 15.7 K/UL (ref 4.1–11.1)

## 2021-12-13 PROCEDURE — 36415 COLL VENOUS BLD VENIPUNCTURE: CPT

## 2021-12-13 PROCEDURE — 80048 BASIC METABOLIC PNL TOTAL CA: CPT

## 2021-12-13 PROCEDURE — 83735 ASSAY OF MAGNESIUM: CPT

## 2021-12-13 PROCEDURE — 74011250636 HC RX REV CODE- 250/636: Performed by: HOSPITALIST

## 2021-12-13 PROCEDURE — C9113 INJ PANTOPRAZOLE SODIUM, VIA: HCPCS | Performed by: INTERNAL MEDICINE

## 2021-12-13 PROCEDURE — 80202 ASSAY OF VANCOMYCIN: CPT

## 2021-12-13 PROCEDURE — 84100 ASSAY OF PHOSPHORUS: CPT

## 2021-12-13 PROCEDURE — 82962 GLUCOSE BLOOD TEST: CPT

## 2021-12-13 PROCEDURE — 36430 TRANSFUSION BLD/BLD COMPNT: CPT

## 2021-12-13 PROCEDURE — 74011000250 HC RX REV CODE- 250: Performed by: INTERNAL MEDICINE

## 2021-12-13 PROCEDURE — 85018 HEMOGLOBIN: CPT

## 2021-12-13 PROCEDURE — 74011000258 HC RX REV CODE- 258: Performed by: INTERNAL MEDICINE

## 2021-12-13 PROCEDURE — 74011250637 HC RX REV CODE- 250/637: Performed by: INTERNAL MEDICINE

## 2021-12-13 PROCEDURE — 65610000006 HC RM INTENSIVE CARE

## 2021-12-13 PROCEDURE — 74011250636 HC RX REV CODE- 250/636: Performed by: INTERNAL MEDICINE

## 2021-12-13 PROCEDURE — 85025 COMPLETE CBC W/AUTO DIFF WBC: CPT

## 2021-12-13 PROCEDURE — 80076 HEPATIC FUNCTION PANEL: CPT

## 2021-12-13 PROCEDURE — 74011250637 HC RX REV CODE- 250/637: Performed by: HOSPITALIST

## 2021-12-13 PROCEDURE — 74011000250 HC RX REV CODE- 250: Performed by: HOSPITALIST

## 2021-12-13 PROCEDURE — 90945 DIALYSIS ONE EVALUATION: CPT

## 2021-12-13 PROCEDURE — 77010033678 HC OXYGEN DAILY

## 2021-12-13 RX ORDER — MORPHINE SULFATE 2 MG/ML
2 INJECTION, SOLUTION INTRAMUSCULAR; INTRAVENOUS
Status: DISPENSED | OUTPATIENT
Start: 2021-12-13 | End: 2021-12-15

## 2021-12-13 RX ORDER — BALSAM PERU/CASTOR OIL
OINTMENT (GRAM) TOPICAL EVERY 12 HOURS
Status: DISCONTINUED | OUTPATIENT
Start: 2021-12-13 | End: 2021-12-24 | Stop reason: HOSPADM

## 2021-12-13 RX ADMIN — MORPHINE SULFATE 2 MG: 2 INJECTION, SOLUTION INTRAMUSCULAR; INTRAVENOUS at 09:44

## 2021-12-13 RX ADMIN — Medication 10 ML: at 11:36

## 2021-12-13 RX ADMIN — SODIUM CHLORIDE 40 MG: 9 INJECTION INTRAMUSCULAR; INTRAVENOUS; SUBCUTANEOUS at 08:22

## 2021-12-13 RX ADMIN — FENTANYL CITRATE 25 MCG: 0.05 INJECTION, SOLUTION INTRAMUSCULAR; INTRAVENOUS at 05:00

## 2021-12-13 RX ADMIN — Medication 10 ML: at 21:01

## 2021-12-13 RX ADMIN — SODIUM CHLORIDE 40 MG: 9 INJECTION INTRAMUSCULAR; INTRAVENOUS; SUBCUTANEOUS at 20:40

## 2021-12-13 RX ADMIN — ASCORBIC ACID, VITAMIN A PALMITATE, CHOLECALCIFEROL, THIAMINE HYDROCHLORIDE, RIBOFLAVIN-5 PHOSPHATE SODIUM, PYRIDOXINE HYDROCHLORIDE, NIACINAMIDE, DEXPANTHENOL, ALPHA-TOCOPHEROL ACETATE, VITAMIN K1, FOLIC ACID, BIOTIN, CYANOCOBALAMIN: 200; 3300; 200; 6; 3.6; 6; 40; 15; 10; 150; 600; 60; 5 INJECTION, SOLUTION INTRAVENOUS at 18:20

## 2021-12-13 RX ADMIN — Medication 1 AMPULE: at 08:21

## 2021-12-13 RX ADMIN — CALCIUM CHLORIDE, MAGNESIUM CHLORIDE, DEXTROSE MONOHYDRATE, LACTIC ACID, SODIUM CHLORIDE, SODIUM BICARBONATE AND POTASSIUM CHLORIDE: 3.68; 3.05; 22; 5.4; 6.46; 3.09; .314 INJECTION INTRAVENOUS at 01:10

## 2021-12-13 RX ADMIN — Medication: at 20:41

## 2021-12-13 RX ADMIN — Medication 10 ML: at 05:07

## 2021-12-13 RX ADMIN — PIPERACILLIN AND TAZOBACTAM 3.38 G: 3; .375 INJECTION, POWDER, LYOPHILIZED, FOR SOLUTION INTRAVENOUS at 09:51

## 2021-12-13 RX ADMIN — FENTANYL CITRATE 25 MCG: 0.05 INJECTION, SOLUTION INTRAMUSCULAR; INTRAVENOUS at 07:00

## 2021-12-13 RX ADMIN — Medication 1 AMPULE: at 20:41

## 2021-12-13 RX ADMIN — METOCLOPRAMIDE HYDROCHLORIDE 5 MG: 5 INJECTION INTRAMUSCULAR; INTRAVENOUS at 21:01

## 2021-12-13 RX ADMIN — FENTANYL CITRATE 25 MCG: 0.05 INJECTION, SOLUTION INTRAMUSCULAR; INTRAVENOUS at 02:35

## 2021-12-13 RX ADMIN — METOCLOPRAMIDE HYDROCHLORIDE 5 MG: 5 INJECTION INTRAMUSCULAR; INTRAVENOUS at 07:00

## 2021-12-13 RX ADMIN — CALCIUM CHLORIDE, MAGNESIUM CHLORIDE, DEXTROSE MONOHYDRATE, LACTIC ACID, SODIUM CHLORIDE, SODIUM BICARBONATE AND POTASSIUM CHLORIDE: 3.68; 3.05; 22; 5.4; 6.46; 3.09; .314 INJECTION INTRAVENOUS at 05:06

## 2021-12-13 RX ADMIN — CHLORHEXIDINE GLUCONATE 15 ML: 1.2 RINSE ORAL at 20:40

## 2021-12-13 RX ADMIN — CALCIUM CHLORIDE, MAGNESIUM CHLORIDE, DEXTROSE MONOHYDRATE, LACTIC ACID, SODIUM CHLORIDE, SODIUM BICARBONATE AND POTASSIUM CHLORIDE: 3.68; 3.05; 22; 5.4; 6.46; 3.09; .314 INJECTION INTRAVENOUS at 11:56

## 2021-12-13 RX ADMIN — PIPERACILLIN AND TAZOBACTAM 3.38 G: 3; .375 INJECTION, POWDER, LYOPHILIZED, FOR SOLUTION INTRAVENOUS at 01:11

## 2021-12-13 RX ADMIN — MORPHINE SULFATE 2 MG: 2 INJECTION, SOLUTION INTRAMUSCULAR; INTRAVENOUS at 22:11

## 2021-12-13 RX ADMIN — MORPHINE SULFATE 2 MG: 2 INJECTION, SOLUTION INTRAMUSCULAR; INTRAVENOUS at 18:13

## 2021-12-13 RX ADMIN — CALCIUM CHLORIDE, MAGNESIUM CHLORIDE, DEXTROSE MONOHYDRATE, LACTIC ACID, SODIUM CHLORIDE, SODIUM BICARBONATE AND POTASSIUM CHLORIDE: 3.68; 3.05; 22; 5.4; 6.46; 3.09; .314 INJECTION INTRAVENOUS at 08:23

## 2021-12-13 RX ADMIN — MORPHINE SULFATE 2 MG: 2 INJECTION, SOLUTION INTRAMUSCULAR; INTRAVENOUS at 13:39

## 2021-12-13 RX ADMIN — CALCIUM CHLORIDE, MAGNESIUM CHLORIDE, DEXTROSE MONOHYDRATE, LACTIC ACID, SODIUM CHLORIDE, SODIUM BICARBONATE AND POTASSIUM CHLORIDE: 3.68; 3.05; 22; 5.4; 6.46; 3.09; .314 INJECTION INTRAVENOUS at 14:42

## 2021-12-13 RX ADMIN — VANCOMYCIN HYDROCHLORIDE 750 MG: 750 INJECTION, POWDER, LYOPHILIZED, FOR SOLUTION INTRAVENOUS at 20:40

## 2021-12-13 RX ADMIN — Medication: at 09:51

## 2021-12-13 RX ADMIN — PIPERACILLIN AND TAZOBACTAM 3.38 G: 3; .375 INJECTION, POWDER, LYOPHILIZED, FOR SOLUTION INTRAVENOUS at 18:18

## 2021-12-13 NOTE — PROGRESS NOTES
GI PROGRESS NOTE  Yonatan Merino NP  [phone] NP in-hospital cell phone M-F until 4:30  After 5pm or on weekends, please call  for physician on call    NAME:Claude Bruna Burly :1975 [id number]   ATTG: Dr Tone Frost PCP: Demetris Hernandez MD Date/Time:  2021 0925 AM     Primary GI: Dr. Rosette Bellamy - Dr Ha Emanuel covering     Assessment:   Acute onset of Hematemesis - 2/2 ulcers in distal esophagus- severe   Acute Blood Loss anemia - hgb 8.0  Melena  Chronic thrombocytopenia - Plt 66  On Warfarin and ASA 81mg - last dose 21 PM  Hyperkalemia - resolved  · Patient with continued melena   · INR 1.0   · 2021:  CT abd/pel showed generalized gaseous distention of large and small bowel. Findings are consistent with a generalized ileus. No evidence of bowel perforation. Continued gastric distention with hemorrhagic material, similar to prior study. · S/p extubation, off pressors     EGD 21 : Esophagus:  Full of fresh blood and large clots. Eventually cleared and two ulcers (10 mm) and (5 mm) seen in distal esophagus at 42 cm. Very friable, but no active bleeding. Injected Epi 1 : 10,000 total of 8.5 cc into area around both ulcers. Stomach: Full of huge clots, so only about 20 % of mucosa seen. Clots in stomach are dark and look old. Cannot find pylorus. Duodenum: not entered due to blood obscuring    EGD 2021:  Esophagus:  Upper and mid esophagus appears normal. Distal esophagus looks diffusely ulcerated with large amount of old clot. No active bleeding seen. Stomach: FULL of old blood, large clots. Cannot evaluate gastric mucosa. Pylorus found and patent. Scope was able to be advanced into duodenum  Duodenum: normal bulb and second portion. No ulcers seen     Bacteremia - Gram + sepsis with dental abscess  COVID+ -  Admitted 21  ESRD on dialysis  Chronic pain  HTN     Plan:   · No direct plans for repeat EGD at this time.   Source of bleeding found during recent EGD, no able to treat endoscopically. Recommend medical management. · Continue ICU level care  · Continue acid suppression  · Follow CBC - transfuse with blood, plts, ffp as necessary  · Hold all anticoagulation - previously on coumadin  · Maintain NPO  · Reglan q8hrs  · Symptomatic care per primary team   · Nothing further to add from a GI standpoint. GI signing-off. Please call with any questions. Thank you for this consult. *Plan of care discussed with Dr. Gareth Kruger      Subjective:   Discussed with RN events overnight. Resting in room. Extubated       Review of Systems:  *COVID +*    Objective:   VITALS:   Last 24hrs VS reviewed since prior progress note. Most recent are:  Visit Vitals  /76   Pulse (!) 105   Temp 98.1 °F (36.7 °C)   Resp 24   Ht 5' 7\" (1.702 m)   Wt 67.9 kg (149 lb 11.1 oz)   SpO2 100%   BMI 23.45 kg/m²       Intake/Output Summary (Last 24 hours) at 12/13/2021 1308  Last data filed at 12/13/2021 1200  Gross per 24 hour   Intake 1425 ml   Output 3444 ml   Net -2019 ml     PHYSICAL EXAM:  ~~Deferred secondary to + COVID~~    Lab and Radiology Data Reviewed: (see below)    Medications Reviewed: (see below)  PMH/SH reviewed - no change compared to H&P  ________________________________________________________________________  Total time spent with patient: 20 minutes ________________________________________________________________________  Care Plan discussed with:  Patient    Family     RN x              Consultant:       Roseanne Oleary NP     Procedures: see electronic medical records for all procedures/Xrays and details which were not copied into this note but were reviewed prior to creation of Plan.       LABS:  Recent Labs     12/13/21  1130 12/13/21  0406 12/12/21  2109 12/12/21  2109 12/12/21  1215 12/12/21  0459   WBC  --  15.7*  --   --   --  22.5*   HGB 8.0* 7.4*   < > 6.8*   < > 7.4*   HCT  --  22.7*  --  21.2*  --  23.2*   PLT  --  66*  --   --   --  69*    < > = values in this interval not displayed. Recent Labs     12/13/21 0406 12/12/21 2023 12/12/21 0459    138 136   K 3.9 3.9 3.9    104 104   CO2 27 25 25   BUN 39* 39* 44*   CREA 2.97* 3.12* 3.32*   GLU 99 119* 105*   CA 8.2* 8.4* 8.3*   MG 2.6* 2.6* 2.5*   PHOS 2.5* 2.6 2.6     Recent Labs     12/13/21 0406   *   TP 5.5*   ALB 1.8*   GLOB 3.7     No results for input(s): INR, PTP, APTT, INREXT, INREXT in the last 72 hours. No results for input(s): FE, TIBC, PSAT, FERR in the last 72 hours. Lab Results   Component Value Date/Time    Folate 7.5 11/23/2011 11:26 AM     No results for input(s): PH, PCO2, PO2 in the last 72 hours. No results for input(s): CPK, CKMB in the last 72 hours.     No lab exists for component: TROPONINI  Lab Results   Component Value Date/Time    Color RED 11/16/2012 05:15 PM    Appearance OPAQUE 11/16/2012 05:15 PM    Specific gravity 1.020 11/16/2012 05:15 PM    Specific gravity 1.011 09/17/2012 01:35 AM    pH (UA) 8.0 11/16/2012 05:15 PM    Protein >300 (A) 11/16/2012 05:15 PM    Glucose 100 (A) 11/16/2012 05:15 PM    Ketone NEGATIVE  11/16/2012 05:15 PM    Bilirubin NEGATIVE  09/17/2012 01:35 AM    Urobilinogen 0.2 11/16/2012 05:15 PM    Nitrites NEGATIVE  11/16/2012 05:15 PM    Leukocyte Esterase NEGATIVE  11/16/2012 05:15 PM    Epithelial cells 5-10 11/16/2012 05:15 PM    Bacteria 3+ (A) 11/16/2012 05:15 PM    WBC >100 (H) 11/16/2012 05:15 PM    RBC >100 (H) 11/16/2012 05:15 PM       MEDICATIONS:  Current Facility-Administered Medications   Medication Dose Route Frequency    balsam peru-castor oiL (VENELEX) ointment   Topical Q12H    morphine injection 2 mg  2 mg IntraVENous Q4H PRN    TPN ADULT - CENTRAL AA 5% D15% W/ ELECTROLYTES AND CA   IntraVENous CONTINUOUS    vancomycin (VANCOCIN) 750 mg in 0.9% sodium chloride 250 mL (VIAL-MATE)  750 mg IntraVENous Q24H    TPN ADULT - CENTRAL AA 5% D15% W/ ELECTROLYTES AND CA   IntraVENous CONTINUOUS    0.9% sodium chloride infusion 250 mL  250 mL IntraVENous PRN    bicarbonate dialysis (PRISMASOL) BG K 4/Ca 2.5 5000 ml solution   Extracorporeal DIALYSIS CONTINUOUS    epoetin emilie-epbx (RETACRIT) injection 20,000 Units  20,000 Units SubCUTAneous Q TUE, THU & SAT    glucagon (GLUCAGEN) injection 1 mg  1 mg IntraMUSCular PRN    insulin lispro (HUMALOG) injection   SubCUTAneous Q6H    dextrose (D50W) injection syrg 12.5-25 g  12.5-25 g IntraVENous PRN    simethicone (MYLICON) 49RK/3.1IL oral drops 80 mg  1.2 mL Oral Multiple    [Held by provider] calcium acetate(phosphat bind) (PHOSLO) capsule 1,334 mg  2 Capsule Per NG tube TID WITH MEALS    metoclopramide HCl (REGLAN) injection 5 mg  5 mg IntraVENous Q8H    albuterol-ipratropium (DUO-NEB) 2.5 MG-0.5 MG/3 ML  3 mL Nebulization Q4H PRN    piperacillin-tazobactam (ZOSYN) 3.375 g in 0.9% sodium chloride (MBP/ADV) 100 mL MBP  3.375 g IntraVENous Q8H    pantoprazole (PROTONIX) 40 mg in 0.9% sodium chloride 10 mL injection  40 mg IntraVENous Q12H    Warfarin- Hold until bleeding has resolved   Other Rx Dosing/Monitoring    albumin human 25% (BUMINATE) solution 25 g  25 g IntraVENous DIALYSIS PRN    alcohol 62% (NOZIN) nasal  1 Ampule  1 Ampule Topical Q12H    chlorhexidine (PERIDEX) 0.12 % mouthwash 15 mL  15 mL Oral Q12H    acetaminophen (TYLENOL) tablet 650 mg  650 mg Oral Q6H PRN    [Held by provider] L.acidophilus-paracasei-S.thermophil-bifidobacter (RISAQUAD) 8 billion cell capsule  1 Capsule Oral DAILY    sodium chloride (NS) flush 5-40 mL  5-40 mL IntraVENous Q8H    sodium chloride (NS) flush 5-40 mL  5-40 mL IntraVENous PRN    polyethylene glycol (MIRALAX) packet 17 g  17 g Oral DAILY PRN    ondansetron (ZOFRAN) injection 4 mg  4 mg IntraVENous Q6H PRN

## 2021-12-13 NOTE — ROUTINE PROCESS
Bedside and Verbal shift change report received from MANUELITO Horn(offgoing nurse). Report included the following information SBAR, Kardex, ED Summary, Procedure Summary, Intake/Output, MAR, Accordion, Recent Results, Med Rec Status, Cardiac Rhythm sinus tachycardia, Alarm Parameters  and Quality Measures. CVVHD remains in-use, continue the current plan of care. 2000:He requested \"Pain medication\", therefore, Skin care completed, medicated with Fentanyl, and repositioned to his side. 2030:No further c/o pain. He requested that I administer the Fentanyl every 2 hours when it is available. I explained that he need to ask for it,   Because I will not wake him to administer it.  2100:Hgb and Hct noted 6.8/& 21.2; see new orders to transfuse one unit of RBC's    2220:One unit of PRBC's started. 2235:Fentanyl administered for generalized discomfort in his legs, back and arm.  2300:He is tolerating the current PRBC's well, no signs or symptoms of a blood transfusion reaction. No facial grimaces. 0100: The PRBC;s ended without any signs or symptoms of a blood transfusion reaction. 0235:Awake, requested pain medication. He stated, \"I'm not comfortable\". Repositions himself without any challenges. 0300:No further c/o pain. 0345:Noted with a 6 beat run of VTach, he remains asymptomatic.   0415:Although he has a short run of VTach, his assessment is unchanged. 0500:He called out requesting \"Fentanyl\"; therefore medicated for back and leg pain. 0530:He stated, \"I want something to stop the stools\". No facial grimaces at this time. 0615:Assessment is unchanged. 0700:Medicated for \"Pain all over my body\"; Fentanyl administered. CVVHD unchanged. 0730:Resting on his side looking out the door, no facial grimaces at this time. Bedside and Verbal shift change report given to SEBASTIAN Cortez RN (oncoming nurse) by myself (offgoing nurse).  Report included the following information SBAR, Kardex, ED Summary, Procedure Summary, Intake/Output, MAR, Accordion, Recent Results, Med Rec Status, Cardiac Rhythm sinus rhythm, Alarm Parameters  and Quality Measures.

## 2021-12-13 NOTE — PROGRESS NOTES
attended IDR where pt was discussed.    Wes Silva M.Div, [facility] Paging Service [phone]

## 2021-12-13 NOTE — PROGRESS NOTES
Pharmacy Antimicrobial Kinetic Dosing    Indication for Antimicrobials: HAP     Current Regimen of Each Antimicrobial:  CRRT pt  Vancomycin 750mg IV q24h - started 12/11 x 7 days  Zosyn 3.375gm IV q8h - started  day 5     Previous Antimicrobial Therapy:  -  Vancomycin Goal Level:   15    Date Dose & Interval Measured (mcg/mL) Predicted AUC/ZECHARIAH    8:25  21.9     11:30 CRRT dosing 22.3 random            Dosing calculator used:  per levels    Significant Positive Cultures:    R Panel - candida albicans, occ GPC   COVID +   BCx - ng final    Conditions for Dosing Consideration:     Labs:  Recent Labs     21  0406 21   CREA 2.97* 3.12* 3.32*   BUN 39* 39* 44*     Recent Labs     21  0406 21  0303   WBC 15.7* 22.5* 28.7*     Temp (24hrs), Av.2 °F (36.8 °C), Min:97.8 °F (36.6 °C), Max:98.8 °F (37.1 °C)    Creatinine Clearance (mL/min):   CrCl (Ideal Body Weight): 29.7   If actual weight < IBW: CrCl (Actual Body Weight) 30.5    Impression/Plan:   CRRT continues; planning transition to HD   Leukocytosis improving, afebrile, Cx pending   Vanc level slightly supratherapeutic  Vancomycin 750mg IV q24h with next dose moved from noon to 20:00  If CRRT continued , plan random  afternoon  If transition from CRRT to HD, we will only redose Vancomycin after HD and we will change Zosyn regimen to HD regimen    Continue Zosyn CRRT regimen  Antimicrobial stop date - Vanc 7 days, Zosyn pending       Pharmacy will follow daily and adjust medications as appropriate for renal function and/or serum levels.     Thank you,  Hung Jones, [facility]

## 2021-12-13 NOTE — CONSULTS
Palliative Medicine  [phone]    Making good strides  Goals clear for full restorative measures    Will sign off    Pastora Nguyễn NP

## 2021-12-13 NOTE — PROGRESS NOTES
0700  Bedside shift change report received from [place], [address] [place] [address] (offgoing nurse). Assumed care of pt at this time. Report included the following information SBAR, Kardex, Intake/Output, MAR, Recent Results, Heart rhythm and Medications. 0730  LEVO off x2 days. CRRT continued, factor 100 ml's/hr easily tolerated. Requesting Fentanyl for pain every 2 hours overnight. Refuses mouth care. Resting at this time. [id number] with MD about frequent Fentanyl usage and unresolved pain. New orders received. 0915  Pain 7/10 legs bilaterally, Morphine 2mg IV given. 1030  Per pt, \"Pain not better after Morphine\". Requested to speak to MD.  MD is aware and is currently in team rounds. Per pt, \"I was getting Dilaudid 2mg IV on the floor. \" When asked further, was receiving medication every 4 hours PRN. 1130  Large black incontinent BM. Placed on bedpan and had a very large loose BM, also black/brow no red seen when cleaned. 1200    Labs drawn and sent for Vanc level and Hgb      1215  Vanc level 22.3 (pharmacist made aware). 2250  Vanc dose held per pharmacist.    [phone] relaxed, resting in bed. Pain medicine given for leg pan at 13:36. Pt continues to rate pain at 8/10. Will inform MD that pt wants to talk with him about pain medicine. 0  Dr. Lawrence Bachelor in to see. Answered all pt's questions. 1540  CRRT filter alarming. Blood returned and tubing removed from [address]. Jorge A catheter flushed with 10cc's NS.    1830  Swabs given.     1940  Report to HOSP [facility]

## 2021-12-13 NOTE — PROGRESS NOTES
Nephrology Progress Note  Blaise Leblanc     [email or web address]  Phone - [phone]   Patient: Shilpi Ryan    YOB: 1975        Date- 12/13/2021   Admit Date: 11/27/2021  CC: Follow up for  ESRD        IMPRESSION & PLAN:   · ESRD-- home HD 5 days per week- Follows up with Dr Destini Beach at [facility] ( now [facility] HD for IV antibiotics.)  · Covid 19 infection  · Hemorrhagic shock  · Acute blood loss anemia from esophageal bleed from esophagitis  · hyperkalemia  · Left arm swelling- s/p angioplasty of left subclavian vein  · Gram positive sepsis from dental abcess  · s/p lead extraction at [facility]  · Anemia of ckd  · Hx of renal tx in past times 2.  · Hypertension  · CAD  · Failed RTX times 2         H/o DVT, s/p ivc filter/ h/o HIT      PLAN-   Status post extubation and now off pressors   Can stop CRRT today by 5:00 pm.  If the cartridge  expires or if the system gets clotted do not restart.  Plan to give a trial of conventional HD tomorrow   [facility] team has been notified     Subjective: Interval History:   Status post extubation  Off pressors  Ongoing CRRT    Objective:   Vitals:    12/13/21 0700 12/13/21 0800 12/13/21 0900 12/13/21 1000   BP: 127/82 132/86 132/84 132/85   Pulse: 93 99 95 96   Resp: 28 23 25 27   Temp:  98.1 °F (36.7 °C)     TempSrc:       SpO2: 100%  100% 100%   Weight:  67.9 kg (149 lb 11.1 oz)     Height:          12/12 0701 - 12/13 0700  In: 1914.3 [I.V.:1514.3]  Out: 3656   Last 3 Recorded Weights in this Encounter    12/10/21 0530 12/12/21 0100 12/13/21 0800   Weight: 68.2 kg (150 lb 5.7 oz) 68.2 kg (150 lb 5.7 oz) 67.9 kg (149 lb 11.1 oz)      Physical exam:   GEN: on vent  NECK- ET tube +  RESP: no distress  NEURO:Can't access due to patient's current condition         Chart reviewed. Pertinent Notes reviewed.      Data Review :  Recent Labs     12/13/21  0406 12/12/21 2023 12/12/21  0459    138 136   K 3.9 3.9 3.9    104 104   CO2 27 25 25   BUN 39* 39* 44*   CREA 2.97* 3.12* 3.32*   GLU 99 119* 105*   CA 8.2* 8.4* 8.3*   MG 2.6* 2.6* 2.5*   PHOS 2.5* 2.6 2.6     Recent Labs     12/13/21  1130 12/13/21  0406 12/12/21  2109 12/12/21  1215 12/12/21  0459 12/11/21  1204 12/11/21  0303   WBC  --  15.7*  --   --  22.5*  --  28.7*   HGB 8.0* 7.4* 6.8*   < > 7.4*   < > 7.7*   HCT  --  22.7* 21.2*  --  23.2*  --  23.9*   PLT  --  66*  --   --  69*  --  54*    < > = values in this interval not displayed. No results for input(s): FE, TIBC, PSAT, FERR in the last 72 hours.    Medication list  reviewed  Current Facility-Administered Medications   Medication Dose Route Frequency    balsam peru-castor oiL (VENELEX) ointment   Topical Q12H    morphine injection 2 mg  2 mg IntraVENous Q4H PRN    TPN ADULT - CENTRAL AA 5% D15% W/ ELECTROLYTES AND CA   IntraVENous CONTINUOUS    TPN ADULT - CENTRAL AA 5% D15% W/ ELECTROLYTES AND CA   IntraVENous CONTINUOUS    vancomycin (VANCOCIN) 750 mg in 0.9% sodium chloride 250 mL (VIAL-MATE)  750 mg IntraVENous Q24H    [COMPLETED] Vancomycin Trough Draw Reminder Note  1 Each Other ONCE    0.9% sodium chloride infusion 250 mL  250 mL IntraVENous PRN    bicarbonate dialysis (PRISMASOL) BG K 4/Ca 2.5 5000 ml solution   Extracorporeal DIALYSIS CONTINUOUS    epoetin emilie-epbx (RETACRIT) injection 20,000 Units  20,000 Units SubCUTAneous Q TUE, THU & SAT    glucagon (GLUCAGEN) injection 1 mg  1 mg IntraMUSCular PRN    insulin lispro (HUMALOG) injection   SubCUTAneous Q6H    dextrose (D50W) injection syrg 12.5-25 g  12.5-25 g IntraVENous PRN    simethicone (MYLICON) 80PD/7.1PL oral drops 80 mg  1.2 mL Oral Multiple    [Held by provider] calcium acetate(phosphat bind) (PHOSLO) capsule 1,334 mg  2 Capsule Per NG tube TID WITH MEALS    metoclopramide HCl (REGLAN) injection 5 mg  5 mg IntraVENous Q8H    albuterol-ipratropium (DUO-NEB) 2.5 MG-0.5 MG/3 ML  3 mL Nebulization Q4H PRN    piperacillin-tazobactam (ZOSYN) 3.375 g in 0.9% sodium chloride (MBP/ADV) 100 mL MBP  3.375 g IntraVENous Q8H    pantoprazole (PROTONIX) 40 mg in 0.9% sodium chloride 10 mL injection  40 mg IntraVENous Q12H    Warfarin- Hold until bleeding has resolved   Other Rx Dosing/Monitoring    albumin human 25% (BUMINATE) solution 25 g  25 g IntraVENous DIALYSIS PRN    alcohol 62% (NOZIN) nasal  1 Ampule  1 Ampule Topical Q12H    chlorhexidine (PERIDEX) 0.12 % mouthwash 15 mL  15 mL Oral Q12H    acetaminophen (TYLENOL) tablet 650 mg  650 mg Oral Q6H PRN    [Held by provider] L.acidophilus-paracasei-S.thermophil-bifidobacter (RISAQUAD) 8 billion cell capsule  1 Capsule Oral DAILY    sodium chloride (NS) flush 5-40 mL  5-40 mL IntraVENous Q8H    sodium chloride (NS) flush 5-40 mL  5-40 mL IntraVENous PRN    polyethylene glycol (MIRALAX) packet 17 g  17 g Oral DAILY PRN    ondansetron (ZOFRAN) injection 4 mg  4 mg IntraVENous Q6H PRN          Sepideh Forbes MD              [facility]  [facility] / [facility] [address] [facility] [address], [address] [place], [address]  Phone - [phone]               Fax - [phone]

## 2021-12-13 NOTE — DIALYSIS
CRRT / [phone]           Orders   Mode: CVVHD restarted @ 0125   Blood Flow Rate: 180 ml/min to optimize pressures    Prismasol Dose: 20 ml/kg/hr x 68 kg = 1360, Rounded to 1350 ml/hr Dialysate   Prismasol Concentrate: 4K/2.5Ca   Blood Warmer Temp: 37*C   Net Fluid Removal:  ml/hr as tolerated            Metrics   BP: 127/84   HR: 104   Access Pressure: -84    (Reversed)   Filter Pressure: 111   Return Pressure: 26   TMP: 26   Pressure Drop: 22            Access   Type & Location: L femoral CVC   Comments: dressing dated, suture intact at insertion site, cuff exposed marked on outside of dressing, nurse notified and will monitor. CDI. +aspiration sluggish arterial lines reversed /+flush x2 ports            Labs   HBsAg (Antigen) / date: Negative 12/1/21      HBsAb (Antibody) / date: Immune 11/11/21   Source: [facility]            Safety:   Time Out Done:   (Time) 1350   Consent obtained/signed: Verified   Education: CVC infection prevention & control. Primary Nurse Rpt Pre: Robert Norman RN    Primary Nurse Rpt Post: Robert Norman RN       Comments / Plan:   Filter changed secondary to increasing TMP & PD pressures . Dialysis RN was able to return all blood from filter (165 ml).       Consents, patient, code status, labs and orders verified. Old set discarded in red bio-hazard bag. New [id number] filter set-up, primed, tested and running well at this time. L femoral CVC, dressing CDI with gauze & bio-patch dated 12/12/21. No signs of redness, drainage, or infection visualized. Each catheter limb disinfected for 60 seconds per limb with alcohol swabs. Caps removed, dialysis CVC hub scrubbed with [employer] for 15 seconds, followed by a 5 second dry time per Hospital P&P. +aspiration/+flush x 2 ports. Lines REVERSED, visible and connections secure with blood warmer to return line at 37*C. Education & pre/post report given to RN.

## 2021-12-13 NOTE — PROGRESS NOTES
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 He was admitted to Hospitalist Service with Acute COVID infection, mild hypoxemic respiratory failure, recent strep intermedius bacteremia. Transferred to ICU  after developing hematemesis and shock. PRINCIPLE ICU DIAGNOSIS:  Hemorrhagic shock due to UGIB    Hospital course/major results:   Admission as above   Transferred to ICU for hematemesis, shock. Intubated semi-electively for EGD. Required multiple units RBC, FFP, plts. EGD revealed small distal esophageal ulcers and large volume of blood and clots in stomach without clear source of bleeding identified. Required vasopressors. Continued blood loss into night requiring more blood products.  CTAP: 1. There is a large amount of hemorrhage within a distended stomach, extending into the duodenum. An active bleed is not seen on this examination. However, delayed images were not performed, somewhat limiting the study. 2. Bilateral pleural effusions with bilateral pneumonia.  L femoral HD catheter palced and CRRT initiated   Remains intubated and on CRRT. Further bleeding with Hgb 6.5. Received one unit RBCs. Repeat EGD: Severe distal esophageal ulceration which is likely cause of bleeding, 2. Stomach full of blood---mucosa not well seen 3. Duodenum is normal. Worsening abdominal distention. Repeat CTAP: Generalized gaseous distention of large and small bowel. Findings are consistent with a generalized ileus. 2. No evidence of bowel perforation. 3. Continued gastric distention with hemorrhagic material, similar to prior study. 4. NG tube in the stomach.  5. Covid 19 pneumonia has worsened in the visualized lung bases. 6. Continued small to moderate bilateral pleural effusions. 12/08 General Surgery consultation  12/08 Palliative Care consultation requested  12/09 Stabilizing. Vasopressor requirements improving. Abdomen exam much improved - softer, less distended. TPN initiated  12/10: Patient remains intubated and sedated, on minimal vent settings. Still has ongoing melena. Transfused one U of blood this morning. Dialysis catheter clotted overnight. 12/11: Patient remains intubated, able to follow commands. On SBT. No acute events overnight. 12/12: Patient reports feeling better today. He was extubated yesterday, on 4lit oxygen by NC, on CRRT.  12/13: Patient is still on CRRT today. No signs of overt bleeding. BP is better today. C/o of pain all over his body. Lines/tubes/devices:  ETT 12/06 >>   R femoral CVL 12/06 >>   L femoral HD catheter 12/07 >>       COMPREHENSIVE CCM ASSESSMENT/PLAN (by systems)       PULMONARY:  1. Vent dep respiratory failure - intubated for EGD  2. B pulmonary infiltrates - likely aspiration/COVID pneumonia. PLAN   - Extubated on 12/11.  - Supplemental oxygen for goal sat of 88-92%. - treated with dexamethasone. On zosyn for possible aspiration pneumonia. -Repeat COVID test in 12/11 is positive again. CARDIOVASCULAR/HEMODYNAMIC:  3. Shock - hemorrhagic       PLAN  - cardiac/hemodynamic monitoring  - MAP goal > 60 mmHg  - SBP goal > 100 mmHg      RENAL:  4. ESRD  5. Mild metabolic acidosis  6. Mild hyperkalemia    Current RRT:     PLAN  - Monitor chemistry panel daily  - Correct electrolytes as indicated  - Nephrology following  - CRRT per Nephrology - initiated 12/07    GI/NUTRITION:  7. Acute UGIB  8. Distal esophageal ulceration  9.  Protein-calorie malnutrition      Current nutritional support: none  SUP: high dose IV PPI    PLAN  - Cont high dose PPI  - Gastroenterology following - their assistance appreciated  - General Surgery consultation 12/08 - no indication for surgery  - TPN since 12/09, entral feeding when ok with GI.      INFECTIOUS DISEASE  10. Suspect aspiration PNA  11. Severe sepsis    Micro:  Resp 12/07 >>    Antibiotics:  Pip-tazo 12/07 >>     PLAN  - Follow culture results to completion  - Duration of antimicrobial therapy TBD    HEME  12. ABLA  13. Thrombocytopenia  14. H/O DVT on chronic warfarin - on hold for now due to GIB. 15. H/o HIT. DVT prophylaxis: SCDs - has IVC filter    PLAN  - Daily CBC  - Transfuse as needed to maintain Hgb > 7.0 gm/dL or for hemodynamically significant bleeding  - Transfuse plts to maintain > 50k  - Transfuse FFP to maintain INR < 1.5  - Holding warfarin    NEURO  16. Cognition fully intact prior to admission  17. ICU/vent associated discomfort      RASS goal: -1, -2  Current sedatives: propofol  Current analgesics: fentanyl    PLAN   - Daily SAT when meets ICU criteria  - ABCDEF mobility bundle  - PT/OT involvement when appropriate      ENDOCRINE  18. Mild hyperglycemia without prior dx of DM    PLAN  - Target glucose: 100-180  - Glycemic control: N/I presently    GOALS OF CARE/ADVANCED DIRECTIVES  19. CODE STATUS: Full   20. HPI/Consult/Subjective:   24 Hr Events 12/13/2021:   As above    SUBJ:   Reports feeling better, no acute events overnight.        Past Medical History:      has a past medical history of Abdominal hematoma, AICD (automatic cardioverter/defibrillator) present, CAD (coronary artery disease), Chronic abdominal pain, Chronic kidney disease, DVT (deep venous thrombosis) ([facility] [address] (2001), DVT of popliteal vein ([facility] [address] (11/2011), Endocarditis, Gallstone pancreatitis, Gastrointestinal disorder, Gastrointestinal disorder, Hemodialysis patient ([facility] [address] High cholesterol, Hypertension, Kidney transplant, Long term current use of anticoagulant therapy, Nephrotic syndrome, Other ill-defined conditions(799.89), Other ill-defined conditions(799.89), Other ill-defined conditions(799.89), Peritonitis ([facility] [address] Seizures ([facility] [address] (2015), Small bowel obstruction ([address] 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[address] and V-tach ([facility] [address] Past Surgical History:      has a past surgical history that includes pr edg us exam surgical alter stom duodenum/jejunum (7/15/2011); pr esophagogastroduodenoscopy transoral diagnostic (5/24/2012); hx cholecystectomy; hx appendectomy; hx small bowel resection; hx other surgical (11/2011); hx other surgical; hx other surgical (02/2013); hx transplant (2001 ); hx transplant (1992); vascular surgery procedure unlist (11/14/2017); hx pacemaker (Left, 6/2009); hx pacemaker (Left); pr cardiac surg procedure unlist (2007); hx vascular access (Right); ir replace cvc tunneled w/o port (9/10/2020); ir remove tunl cvad w/o port / pump (10/29/2020); upper gi endoscopy,ctrl bleed (12/6/2021); ir insert non tunl cvc over 5 yrs (12/7/2021); upper gi endoscopy,diagnosis (12/8/2021); and ir insert non tunl cvc over 5 yrs (12/10/2021). Home Medications:     Prior to Admission medications    Medication Sig Start Date End Date Taking? Authorizing Provider   ondansetron (Zofran ODT) 4 mg disintegrating tablet 1 Tab by SubLINGual route every eight (8) hours as needed for Nausea or Vomiting. 3/27/21   Sagrario Gonzalez MD   metoprolol succinate (TOPROL-XL) 25 mg XL tablet Take 0.5 Tabs by mouth daily. 11/10/20   Luz Fisher MD   calcium acetate,phosphat bind, (PHOSLO) 667 mg cap Take 2 Caps by mouth three (3) times daily (with meals). Indications: low amount of calcium in the blood, renal osteodystrophy with hyperphosphatemia 11/10/20   Luz Fisher MD   atorvastatin (Lipitor) 20 mg tablet Take 20 mg by mouth daily. Provider, Historical   warfarin (COUMADIN) 2.5 mg tablet Take 2.5 mg by mouth daily. Provider, Historical   acetaminophen (TYLENOL) 500 mg tablet Take 1 Tab by mouth every four (4) hours as needed for Pain.  Over the counter 1/20/19   Pearly Sicard, MD   omeprazole (PRILOSEC) 20 mg capsule Take 20 mg by mouth daily. Provider, Historical   calcitRIOL (ROCALTROL) 0.5 mcg capsule Take 0.5 mcg by mouth daily. Provider, Historical   aspirin 81 mg chewable tablet Take 81 mg by mouth daily. Other, MD Morro       Allergies/Social/Family History: Allergies   Allergen Reactions    Shellfish Containing Products Swelling     Break out in rash, trouble breathing    Contrast Agent [Iodine] Shortness of Breath    Heparin Analogues Other (comments)     Positive history of HIT      Social History     Tobacco Use    Smoking status: Never Smoker    Smokeless tobacco: Never Used   Substance Use Topics    Alcohol use: No      Family History   Problem Relation Age of Onset    COPD Mother     Lung Disease Mother     Cancer Father         stomach    Cancer Maternal Grandmother            Objective:   Vital Signs:  Visit Vitals  BP (!) 135/97   Pulse (!) 102   Temp 98.1 °F (36.7 °C)   Resp 30   Ht 5' 7\" (1.702 m)   Wt 67.9 kg (149 lb 11.1 oz)   SpO2 100%   BMI 23.45 kg/m²    O2 Flow Rate (L/min): 4 l/min O2 Device: Nasal cannula Temp (24hrs), Av.2 °F (36.8 °C), Min:97.8 °F (36.6 °C), Max:98.8 °F (37.1 °C)           Intake/Output:     Intake/Output Summary (Last 24 hours) at 2021 1542  Last data filed at 2021 1400  Gross per 24 hour   Intake 1316 ml   Output 3584 ml   Net -2268 ml       Physical Exam:  GEN: chronically ill-appearing,   HEENT: NCAT, sclerae white  NECK: No JVD noted  CHEST: no resp distress, on NC oxygen.   CARDIAC: sinus rhythm, regular, no murmur noted  ABD: les distended, softer, + tympani - less prominent, hypoactive BS  EXT: Symmetric LE edema  NEURO: Cranial nerves intact, symmetric strength, no focal deficits noted  DERM: No lesions noted    I have examined the patient on this day 2021 and the above documented exam is accurate including the components that have been copied forward    LABS AND  DATA: Personally reviewed  Recent Labs     21  1130 12/13/21  0406 12/12/21 2109 12/12/21  2109 12/12/21  1215 12/12/21  0459   WBC  --  15.7*  --   --   --  22.5*   HGB 8.0* 7.4*   < > 6.8*   < > 7.4*   HCT  --  22.7*  --  21.2*  --  23.2*   PLT  --  66*  --   --   --  69*    < > = values in this interval not displayed. Recent Labs     12/13/21  0406 12/12/21 2023    138   K 3.9 3.9    104   CO2 27 25   BUN 39* 39*   CREA 2.97* 3.12*   GLU 99 119*   CA 8.2* 8.4*   MG 2.6* 2.6*   PHOS 2.5* 2.6     Recent Labs     12/13/21 0406   *   TP 5.5*   ALB 1.8*   GLOB 3.7     No results for input(s): INR, PTP, APTT, INREXT, INREXT in the last 72 hours. No results for input(s): PHI, PCO2I, PO2I, FIO2I in the last 72 hours. No results for input(s): CPK, CKMB, TROIQ, BNPP in the last 72 hours. Hemodynamics:   PAP:   CO:     Wedge:   CI:     CVP:    SVR:       PVR:       Ventilator Settings:  Mode Rate Tidal Volume Pressure FiO2 PEEP   Assist control   345 ml  6 cm H2O 30 % 5 cm H20     Peak airway pressure: 19 cm H2O    Minute ventilation: 7.57 l/min        MEDS: Reviewed    Chest X-Ray:  CXR Results  (Last 48 hours)    None          I have reviewed the above films and agree with official interpretation         Multidisciplinary Rounds Completed:  Yes      SPECIAL EQUIPMENT  CRRT    DISPOSITION  Stay in ICU    CRITICAL CARE CONSULTANT NOTE  I had a in-person encounter with Sanjuana Velazco, reviewed and interpreted patient data including events, labs, images, vital signs, I/O's, and examined patient. I have discussed the case and the plan and management of the patient's care with the consulting services, the bedside nurses and the respiratory therapist.      NOTE OF PERSONAL INVOLVEMENT IN CARE   This patient is at high risk for sudden and clinically significant deterioration, which requires the highest level of preparedness to intervene urgently.  I participated in the decision-making and personally managed or directed the management of the following life and organ supporting interventions that required my frequent assessment to treat or prevent imminent deterioration. I personally spent 40 minutes of critical care time. This is time spent at patient's bedside actively involved in patient care as well as the coordination of care and discussions with the patient's family. This does not include any procedural time which has been billed separately.     Sparkle Flores MD  Pulmonary/[facility] Critical Care  [phone]  12/13/2021

## 2021-12-14 LAB
ANION GAP SERPL CALC-SCNC: 6 MMOL/L (ref 5–15)
BASOPHILS # BLD: 0 K/UL (ref 0–0.1)
BASOPHILS NFR BLD: 0 % (ref 0–1)
BUN SERPL-MCNC: 49 MG/DL (ref 6–20)
BUN/CREAT SERPL: 12 (ref 12–20)
CALCIUM SERPL-MCNC: 8 MG/DL (ref 8.5–10.1)
CHLORIDE SERPL-SCNC: 108 MMOL/L (ref 97–108)
CO2 SERPL-SCNC: 27 MMOL/L (ref 21–32)
CREAT SERPL-MCNC: 3.94 MG/DL (ref 0.7–1.3)
DATE LAST DOSE: ABNORMAL
DIFFERENTIAL METHOD BLD: ABNORMAL
EOSINOPHIL # BLD: 0.4 K/UL (ref 0–0.4)
EOSINOPHIL NFR BLD: 4 % (ref 0–7)
ERYTHROCYTE [DISTWIDTH] IN BLOOD BY AUTOMATED COUNT: 19.7 % (ref 11.5–14.5)
GLUCOSE BLD STRIP.AUTO-MCNC: 107 MG/DL (ref 65–117)
GLUCOSE BLD STRIP.AUTO-MCNC: 114 MG/DL (ref 65–117)
GLUCOSE BLD STRIP.AUTO-MCNC: 83 MG/DL (ref 65–117)
GLUCOSE SERPL-MCNC: 101 MG/DL (ref 65–100)
HCT VFR BLD AUTO: 22.2 % (ref 36.6–50.3)
HGB BLD-MCNC: 6.9 G/DL (ref 12.1–17)
HGB BLD-MCNC: 8.2 G/DL (ref 12.1–17)
HISTORY CHECKED?,CKHIST: NORMAL
IMM GRANULOCYTES # BLD AUTO: 0 K/UL (ref 0–0.04)
IMM GRANULOCYTES NFR BLD AUTO: 0 % (ref 0–0.5)
LYMPHOCYTES # BLD: 0.8 K/UL (ref 0.8–3.5)
LYMPHOCYTES NFR BLD: 8 % (ref 12–49)
MAGNESIUM SERPL-MCNC: 2.7 MG/DL (ref 1.6–2.4)
MCH RBC QN AUTO: 29.1 PG (ref 26–34)
MCHC RBC AUTO-ENTMCNC: 31.1 G/DL (ref 30–36.5)
MCV RBC AUTO: 93.7 FL (ref 80–99)
MONOCYTES # BLD: 1 K/UL (ref 0–1)
MONOCYTES NFR BLD: 11 % (ref 5–13)
NEUTS SEG # BLD: 7.3 K/UL (ref 1.8–8)
NEUTS SEG NFR BLD: 77 % (ref 32–75)
NRBC # BLD: 0.12 K/UL (ref 0–0.01)
NRBC BLD-RTO: 1.3 PER 100 WBC
PHOSPHATE SERPL-MCNC: 3.3 MG/DL (ref 2.6–4.7)
PLATELET # BLD AUTO: 67 K/UL (ref 150–400)
PMV BLD AUTO: 12.2 FL (ref 8.9–12.9)
POTASSIUM SERPL-SCNC: 4.1 MMOL/L (ref 3.5–5.1)
RBC # BLD AUTO: 2.37 M/UL (ref 4.1–5.7)
RBC MORPH BLD: ABNORMAL
REPORTED DOSE,DOSE: ABNORMAL UNITS
REPORTED DOSE/TIME,TMG: ABNORMAL
SERVICE CMNT-IMP: NORMAL
SODIUM SERPL-SCNC: 141 MMOL/L (ref 136–145)
VANCOMYCIN TROUGH SERPL-MCNC: 19.3 UG/ML (ref 5–10)
WBC # BLD AUTO: 9.5 K/UL (ref 4.1–11.1)

## 2021-12-14 PROCEDURE — 74011250636 HC RX REV CODE- 250/636: Performed by: HOSPITALIST

## 2021-12-14 PROCEDURE — 74011250637 HC RX REV CODE- 250/637: Performed by: HOSPITALIST

## 2021-12-14 PROCEDURE — 90935 HEMODIALYSIS ONE EVALUATION: CPT

## 2021-12-14 PROCEDURE — 85025 COMPLETE CBC W/AUTO DIFF WBC: CPT

## 2021-12-14 PROCEDURE — 74011000250 HC RX REV CODE- 250: Performed by: INTERNAL MEDICINE

## 2021-12-14 PROCEDURE — 74011000250 HC RX REV CODE- 250: Performed by: HOSPITALIST

## 2021-12-14 PROCEDURE — 74011250636 HC RX REV CODE- 250/636: Performed by: INTERNAL MEDICINE

## 2021-12-14 PROCEDURE — 36415 COLL VENOUS BLD VENIPUNCTURE: CPT

## 2021-12-14 PROCEDURE — 85018 HEMOGLOBIN: CPT

## 2021-12-14 PROCEDURE — 74011000258 HC RX REV CODE- 258: Performed by: HOSPITALIST

## 2021-12-14 PROCEDURE — C9113 INJ PANTOPRAZOLE SODIUM, VIA: HCPCS | Performed by: INTERNAL MEDICINE

## 2021-12-14 PROCEDURE — 77010033678 HC OXYGEN DAILY

## 2021-12-14 PROCEDURE — 36430 TRANSFUSION BLD/BLD COMPNT: CPT

## 2021-12-14 PROCEDURE — 86923 COMPATIBILITY TEST ELECTRIC: CPT

## 2021-12-14 PROCEDURE — 84100 ASSAY OF PHOSPHORUS: CPT

## 2021-12-14 PROCEDURE — P9016 RBC LEUKOCYTES REDUCED: HCPCS

## 2021-12-14 PROCEDURE — 86900 BLOOD TYPING SEROLOGIC ABO: CPT

## 2021-12-14 PROCEDURE — P9040 RBC LEUKOREDUCED IRRADIATED: HCPCS

## 2021-12-14 PROCEDURE — 65660000000 HC RM CCU STEPDOWN

## 2021-12-14 PROCEDURE — 82962 GLUCOSE BLOOD TEST: CPT

## 2021-12-14 PROCEDURE — 74011250637 HC RX REV CODE- 250/637: Performed by: INTERNAL MEDICINE

## 2021-12-14 PROCEDURE — 80202 ASSAY OF VANCOMYCIN: CPT

## 2021-12-14 PROCEDURE — 2709999900 HC NON-CHARGEABLE SUPPLY

## 2021-12-14 PROCEDURE — 74011000258 HC RX REV CODE- 258: Performed by: INTERNAL MEDICINE

## 2021-12-14 PROCEDURE — 80048 BASIC METABOLIC PNL TOTAL CA: CPT

## 2021-12-14 PROCEDURE — 83735 ASSAY OF MAGNESIUM: CPT

## 2021-12-14 RX ORDER — DIPHENHYDRAMINE HCL 25 MG
25 CAPSULE ORAL
Status: DISCONTINUED | OUTPATIENT
Start: 2021-12-14 | End: 2021-12-24 | Stop reason: HOSPADM

## 2021-12-14 RX ORDER — SODIUM CHLORIDE 9 MG/ML
250 INJECTION, SOLUTION INTRAVENOUS AS NEEDED
Status: DISCONTINUED | OUTPATIENT
Start: 2021-12-14 | End: 2021-12-16 | Stop reason: ALTCHOICE

## 2021-12-14 RX ADMIN — SODIUM CHLORIDE 40 MG: 9 INJECTION INTRAMUSCULAR; INTRAVENOUS; SUBCUTANEOUS at 21:02

## 2021-12-14 RX ADMIN — MORPHINE SULFATE 2 MG: 2 INJECTION, SOLUTION INTRAMUSCULAR; INTRAVENOUS at 02:12

## 2021-12-14 RX ADMIN — MORPHINE SULFATE 2 MG: 2 INJECTION, SOLUTION INTRAMUSCULAR; INTRAVENOUS at 14:29

## 2021-12-14 RX ADMIN — DIPHENHYDRAMINE HYDROCHLORIDE 25 MG: 25 CAPSULE ORAL at 21:56

## 2021-12-14 RX ADMIN — Medication 10 ML: at 21:06

## 2021-12-14 RX ADMIN — Medication 1 AMPULE: at 08:10

## 2021-12-14 RX ADMIN — CHLORHEXIDINE GLUCONATE 15 ML: 1.2 RINSE ORAL at 21:02

## 2021-12-14 RX ADMIN — Medication 1 AMPULE: at 21:03

## 2021-12-14 RX ADMIN — SODIUM CHLORIDE 40 MG: 9 INJECTION INTRAMUSCULAR; INTRAVENOUS; SUBCUTANEOUS at 12:42

## 2021-12-14 RX ADMIN — Medication 10 ML: at 06:10

## 2021-12-14 RX ADMIN — Medication: at 08:11

## 2021-12-14 RX ADMIN — PIPERACILLIN AND TAZOBACTAM 3.38 G: 3; .375 INJECTION, POWDER, LYOPHILIZED, FOR SOLUTION INTRAVENOUS at 14:35

## 2021-12-14 RX ADMIN — PIPERACILLIN AND TAZOBACTAM 3.38 G: 3; .375 INJECTION, POWDER, LYOPHILIZED, FOR SOLUTION INTRAVENOUS at 02:13

## 2021-12-14 RX ADMIN — MORPHINE SULFATE 2 MG: 2 INJECTION, SOLUTION INTRAMUSCULAR; INTRAVENOUS at 06:10

## 2021-12-14 RX ADMIN — Medication 10 ML: at 14:36

## 2021-12-14 RX ADMIN — ASCORBIC ACID, VITAMIN A PALMITATE, CHOLECALCIFEROL, THIAMINE HYDROCHLORIDE, RIBOFLAVIN-5 PHOSPHATE SODIUM, PYRIDOXINE HYDROCHLORIDE, NIACINAMIDE, DEXPANTHENOL, ALPHA-TOCOPHEROL ACETATE, VITAMIN K1, FOLIC ACID, BIOTIN, CYANOCOBALAMIN: 200; 3300; 200; 6; 3.6; 6; 40; 15; 10; 150; 600; 60; 5 INJECTION, SOLUTION INTRAVENOUS at 18:45

## 2021-12-14 RX ADMIN — MORPHINE SULFATE 2 MG: 2 INJECTION, SOLUTION INTRAMUSCULAR; INTRAVENOUS at 18:44

## 2021-12-14 RX ADMIN — LEUCINE, PHENYLALANINE, LYSINE, METHIONINE, ISOLEUCINE, VALINE, HISTIDINE, THREONINE, TRYPTOPHAN, ALANINE, GLYCINE, ARGININE, PROLINE, SERINE, TYROSINE, SODIUM ACETATE, DIBASIC POTASSIUM PHOSPHATE, MAGNESIUM CHLORIDE, SODIUM CHLORIDE, CALCIUM CHLORIDE, DEXTROSE
365; 280; 290; 200; 300; 290; 240; 210; 90; 1035; 515; 575; 340; 250; 20; 340; 261; 51; 59; 33; 15 INJECTION INTRAVENOUS at 12:46

## 2021-12-14 RX ADMIN — MORPHINE SULFATE 2 MG: 2 INJECTION, SOLUTION INTRAMUSCULAR; INTRAVENOUS at 22:52

## 2021-12-14 RX ADMIN — MORPHINE SULFATE 2 MG: 2 INJECTION, SOLUTION INTRAMUSCULAR; INTRAVENOUS at 10:52

## 2021-12-14 RX ADMIN — Medication: at 21:03

## 2021-12-14 RX ADMIN — EPOETIN ALFA-EPBX 20000 UNITS: 20000 INJECTION, SOLUTION INTRAVENOUS; SUBCUTANEOUS at 21:56

## 2021-12-14 RX ADMIN — MORPHINE SULFATE 2 MG: 2 INJECTION, SOLUTION INTRAMUSCULAR; INTRAVENOUS at 10:23

## 2021-12-14 RX ADMIN — CHLORHEXIDINE GLUCONATE 15 ML: 1.2 RINSE ORAL at 08:09

## 2021-12-14 NOTE — PROGRESS NOTES
Care Management:     MARINO :     RUR:26%  Disposition:Home with [facility] [address] is goal. CM updated via [employer]. Anticipate he will also need to go to dialysis at [facility] for  ABX therapy . Once IV therapy complete he may be able to resume home HD 5 days a week. Follow up appointments:PCP, oral surgeon-Dr Jenna Mejia, [phone]- scheduled for Dec 23rd @10:30am  DME needed:RW from [employer] delivered to patient in his room. Transportation at [address]  [facility]   . [employer] [place] or means to access home:    family    Medicare Letter: Will provide upon d/c   Is patient a BCPI-A Bundle:  N/A            Caregiver Contact:sister, Julia Yao [phone]  Discharge Caregiver contacted prior to discharge?     Has transfer orders to step down. Covid and on 02 2l nc. ESRD and CRRT stopped and now on HD. He is ESRD and he is usually maintained on HD five days a week at home and followed by Dr Meredith Bal. Because of ABX therapy he will need to go and have dialysis at the [facility] .  CM has sent updates to [facility] via [employer].       PTA he lived with his sister Lucy Bedolla .       CM to continue to monitor for d/c needs.     Gricelda Lebron RN ACM 2019

## 2021-12-14 NOTE — PROGRESS NOTES
1900: Bedside shift change report given received from Ozzie Patricio. Report included the following information SBAR, Kardex, ED Summary, Intake/Output, MAR, Recent Results and Cardiac Rhythm Sinus Tachycardia. Patient educated several times throughout shift for taking off SCD's and attempting to take antiembolic stocking off; patient states he is aware he is high risk for DVT's and that it has been a long time since he last had a DVT; Nurse expressed the importance of preventions methods put in place and patient being compliant; patient verbally acknowledged understanding, but continued with noncompliant behavior      0700:Bedside shift change report given to HANDY Hart (oncoming nurse) by Nohemi Camargo RN (offgoing nurse). Report included the following information SBAR, Kardex, ED Summary, Procedure Summary, Intake/Output, MAR, Recent Results and Cardiac Rhythm Sinus tachy    Shift worked:  5050-7591     Shift summary and any significant changes:    1.)Pt. Requested & received PRN morphine x 3 for generalized pain  2.)Pt. Noted taking SCD's off multiple times during shift despite education, also attempted to take antiembolic stockings off   3.) Requesting a sleep medication for insomnia c/o not being able to sleep well since being extubated   Concerns for physician to address:  Possible sleep med?      Zone phone for [facility] shift:   [phone]       Activity:  Activity Level: Bed Rest  Number times ambulated in hallways past shift: 0  Number of times OOB to chair past shift: 0    Cardiac:   Cardiac Monitoring: Yes      Cardiac Rhythm: Sinus Tachy    Access:   Current line(s): PIV, central line and HD access     Genitourinary:   Urinary status: anuric    Respiratory:   O2 Device: Nasal cannula  Chronic home O2 use?: NO  Incentive spirometer at bedside: NO     GI:  Last Bowel Movement Date: 12/13/21  Current diet:  TPN ADULT - CENTRAL AA 5% D15% W/ ELECTROLYTES AND CA  Passing flatus: YES  Tolerating current diet: YES       Pain Management:   Patient states pain is manageable on current regimen: YES    Skin:  Yong Score: 15  Interventions: float heels    Patient Safety:  Fall Score:  Total Score: 2  Interventions: bed/chair alarm, gripper socks and pt to call before getting OOB  High Fall Risk: Yes    Length of Stay:  Expected LOS: 4d 21h  Actual LOS: [address],

## 2021-12-14 NOTE — PROGRESS NOTES
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 He was admitted to Hospitalist Service with Acute COVID infection, mild hypoxemic respiratory failure, recent strep intermedius bacteremia. Transferred to ICU  after developing hematemesis and shock. PRINCIPLE ICU DIAGNOSIS:  Hemorrhagic shock due to UGIB    Hospital course/major results:   Admission as above   Transferred to ICU for hematemesis, shock. Intubated semi-electively for EGD. Required multiple units RBC, FFP, plts. EGD revealed small distal esophageal ulcers and large volume of blood and clots in stomach without clear source of bleeding identified. Required vasopressors. Continued blood loss into night requiring more blood products.  CTAP: 1. There is a large amount of hemorrhage within a distended stomach, extending into the duodenum. An active bleed is not seen on this examination. However, delayed images were not performed, somewhat limiting the study. 2. Bilateral pleural effusions with bilateral pneumonia.  L femoral HD catheter palced and CRRT initiated   Remains intubated and on CRRT. Further bleeding with Hgb 6.5. Received one unit RBCs. Repeat EGD: Severe distal esophageal ulceration which is likely cause of bleeding, 2. Stomach full of blood---mucosa not well seen 3. Duodenum is normal. Worsening abdominal distention. Repeat CTAP: Generalized gaseous distention of large and small bowel. Findings are consistent with a generalized ileus. 2. No evidence of bowel perforation. 3. Continued gastric distention with hemorrhagic material, similar to prior study. 4. NG tube in the stomach.  5. Covid 19 pneumonia has worsened in the visualized lung bases. 6. Continued small to moderate bilateral pleural effusions. 12/08 General Surgery consultation  12/08 Palliative Care consultation requested  12/09 Stabilizing. Vasopressor requirements improving. Abdomen exam much improved - softer, less distended. TPN initiated  12/10: Patient remains intubated and sedated, on minimal vent settings. Still has ongoing melena. Transfused one U of blood this morning. Dialysis catheter clotted overnight. 12/11: Patient remains intubated, able to follow commands. On SBT. No acute events overnight. 12/12: Patient reports feeling better today. He was extubated yesterday, on 4lit oxygen by NC, on CRRT.  12/13: Patient is still on CRRT today. No signs of overt bleeding. BP is better today. C/o of pain all over his body. 12/14: Patient reports feeling better than yesterday. He is getting hemodialysis today. No acute events overnight. Lines/tubes/devices:  ETT 12/06 >>   R femoral CVL 12/06 >>   L femoral HD catheter 12/07 >>       COMPREHENSIVE CCM ASSESSMENT/PLAN (by systems)       PULMONARY:  1. Vent dep respiratory failure - intubated for EGD  2. B pulmonary infiltrates - likely aspiration/COVID pneumonia. PLAN   - Extubated on 12/11.  - Supplemental oxygen for goal sat of 88-92%. - treated with dexamethasone. On zosyn for possible aspiration pneumonia. -Repeat COVID test in 12/11 is positive again. CARDIOVASCULAR/HEMODYNAMIC:  3. Shock - hemorrhagic       PLAN  - cardiac/hemodynamic monitoring  - MAP goal > 60 mmHg  - SBP goal > 100 mmHg      RENAL:  4. ESRD  5. Mild metabolic acidosis  6. Mild hyperkalemia    Current RRT:     PLAN  - Monitor chemistry panel daily  - Correct electrolytes as indicated  - Nephrology following  - CRRT per Nephrology - initiated 12/07    GI/NUTRITION:  7. Acute UGIB  8. Distal esophageal ulceration  9.  Protein-calorie malnutrition      Current nutritional support: none  SUP: high dose IV PPI    PLAN  - Cont high dose PPI  - Gastroenterology following - their assistance appreciated  - General Surgery consultation 12/08 - no indication for surgery  - TPN since 12/09, entral feeding when ok with GI.      INFECTIOUS DISEASE  10. Suspect aspiration PNA  11. Severe sepsis    Micro:  Resp 12/07 >>    Antibiotics:  Pip-tazo 12/07 >>     PLAN  - Follow culture results to completion  - Duration of antimicrobial therapy TBD    HEME  12. ABLA  13. Thrombocytopenia  14. H/O DVT on chronic warfarin - on hold for now due to GIB. 15. H/o HIT. DVT prophylaxis: SCDs - has IVC filter    PLAN  - Daily CBC  - Transfuse as needed to maintain Hgb > 7.0 gm/dL or for hemodynamically significant bleeding  - Transfuse plts to maintain > 50k  - Transfuse FFP to maintain INR < 1.5  - Holding warfarin    NEURO  16. Cognition fully intact prior to admission  17. ICU/vent associated discomfort      RASS goal: -1, -2  Current sedatives: propofol  Current analgesics: fentanyl    PLAN   - Daily SAT when meets ICU criteria  - ABCDEF mobility bundle  - PT/OT involvement when appropriate      ENDOCRINE  18. Mild hyperglycemia without prior dx of DM    PLAN  - Target glucose: 100-180  - Glycemic control: N/I presently    GOALS OF CARE/ADVANCED DIRECTIVES  19. CODE STATUS: Full   20. May transfer out of ICU if does well on HD today. HPI/Consult/Subjective:   24 Hr Events 12/14/2021:   As above    SUBJ:   Reports feeling better, no acute events overnight.        Past Medical History:      has a past medical history of Abdominal hematoma, AICD (automatic cardioverter/defibrillator) present, CAD (coronary artery disease), Chronic abdominal pain, Chronic kidney disease, DVT (deep venous thrombosis) ([facility] [address] (2001), DVT of popliteal vein ([facility] [address] (11/2011), Endocarditis, Gallstone pancreatitis, Gastrointestinal disorder, Gastrointestinal disorder, Hemodialysis patient ([facility] [address] High cholesterol, Hypertension, Kidney transplant, Long term current use of anticoagulant therapy, Nephrotic syndrome, Other ill-defined conditions(799.89), Other ill-defined conditions(799.89), Other ill-defined conditions(799.89), Peritonitis ([address] Seizures ([address] (2015), Small bowel obstruction ([address] 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[address] and V-tach ([address] Past Surgical History:      has a past surgical history that includes pr edg us exam surgical alter stom duodenum/jejunum (7/15/2011); pr esophagogastroduodenoscopy transoral diagnostic (5/24/2012); hx cholecystectomy; hx appendectomy; hx small bowel resection; hx other surgical (11/2011); hx other surgical; hx other surgical (02/2013); hx transplant (2001 ); hx transplant (1992); vascular surgery procedure unlist (11/14/2017); hx pacemaker (Left, 6/2009); hx pacemaker (Left); pr cardiac surg procedure unlist (2007); hx vascular access (Right); ir replace cvc tunneled w/o port (9/10/2020); ir remove tunl cvad w/o port / pump (10/29/2020); upper gi endoscopy,ctrl bleed (12/6/2021); ir insert non tunl cvc over 5 yrs (12/7/2021); upper gi endoscopy,diagnosis (12/8/2021); and ir insert non tunl cvc over 5 yrs (12/10/2021). Home Medications:     Prior to Admission medications    Medication Sig Start Date End Date Taking? Authorizing Provider   ondansetron (Zofran ODT) 4 mg disintegrating tablet 1 Tab by SubLINGual route every eight (8) hours as needed for Nausea or Vomiting. 3/27/21   Shante Sewell MD   metoprolol succinate (TOPROL-XL) 25 mg XL tablet Take 0.5 Tabs by mouth daily. 11/10/20   Skyla Sheehan MD   calcium acetate,phosphat bind, (PHOSLO) 667 mg cap Take 2 Caps by mouth three (3) times daily (with meals). Indications: low amount of calcium in the blood, renal osteodystrophy with hyperphosphatemia 11/10/20   Skyla Sheehan MD   atorvastatin (Lipitor) 20 mg tablet Take 20 mg by mouth daily. Provider, Historical   warfarin (COUMADIN) 2.5 mg tablet Take 2.5 mg by mouth daily.     Provider, Historical   acetaminophen (TYLENOL) 500 mg tablet Take 1 Tab by mouth every four (4) hours as needed for Pain. Over the counter 19   Hemant Neff MD   omeprazole (PRILOSEC) 20 mg capsule Take 20 mg by mouth daily. Provider, Historical   calcitRIOL (ROCALTROL) 0.5 mcg capsule Take 0.5 mcg by mouth daily. Provider, Historical   aspirin 81 mg chewable tablet Take 81 mg by mouth daily. Other, MD Morro       Allergies/Social/Family History: Allergies   Allergen Reactions    Shellfish Containing Products Swelling     Break out in rash, trouble breathing    Contrast Agent [Iodine] Shortness of Breath    Heparin Analogues Other (comments)     Positive history of HIT      Social History     Tobacco Use    Smoking status: Never Smoker    Smokeless tobacco: Never Used   Substance Use Topics    Alcohol use: No      Family History   Problem Relation Age of Onset    COPD Mother     Lung Disease Mother     Cancer Father         stomach    Cancer Maternal Grandmother            Objective:   Vital Signs:  Visit Vitals  /74   Pulse 96   Temp 98.2 °F (36.8 °C) (Oral)   Resp 28   Ht 5' 7\" (1.702 m)   Wt 67.9 kg (149 lb 11.1 oz)   SpO2 100%   BMI 23.45 kg/m²    O2 Flow Rate (L/min): 3 l/min O2 Device: Nasal cannula Temp (24hrs), Av.8 °F (37.1 °C), Min:98.1 °F (36.7 °C), Max:100.1 °F (37.8 °C)           Intake/Output:     Intake/Output Summary (Last 24 hours) at 2021 1007  Last data filed at 2021 0800  Gross per 24 hour   Intake 1893 ml   Output 730 ml   Net 1163 ml       Physical Exam:  GEN: chronically ill-appearing,   HEENT: NCAT, sclerae white  NECK: No JVD noted  CHEST: no resp distress, on NC oxygen.   CARDIAC: sinus rhythm, regular, no murmur noted  ABD: les distended, softer, + tympani - less prominent, hypoactive BS  EXT: Symmetric LE edema  NEURO: Cranial nerves intact, symmetric strength, no focal deficits noted  DERM: No lesions noted    I have examined the patient on this day 2021 and the above documented exam is accurate including the components that have been copied forward    LABS AND  DATA: Personally reviewed  Recent Labs     12/14/21 0513 12/13/21 2037 12/13/21  1130 12/13/21 0406   WBC 9.5  --   --  15.7*   HGB 6.9* 7.2*   < > 7.4*   HCT 22.2*  --   --  22.7*   PLT 67*  --   --  66*    < > = values in this interval not displayed. Recent Labs     12/14/21 0513 12/13/21 2037    139   K 4.1 4.0    107   CO2 27 25   BUN 49* 43*   CREA 3.94* 3.36*   * 103*   CA 8.0* 8.2*   MG 2.7* 2.4   PHOS 3.3 2.9     Recent Labs     12/13/21 0406   *   TP 5.5*   ALB 1.8*   GLOB 3.7     No results for input(s): INR, PTP, APTT, INREXT, INREXT in the last 72 hours. No results for input(s): PHI, PCO2I, PO2I, FIO2I in the last 72 hours. No results for input(s): CPK, CKMB, TROIQ, BNPP in the last 72 hours. Hemodynamics:   PAP:   CO:     Wedge:   CI:     CVP:    SVR:       PVR:       Ventilator Settings:  Mode Rate Tidal Volume Pressure FiO2 PEEP   Assist control   345 ml  6 cm H2O 30 % 5 cm H20     Peak airway pressure: 19 cm H2O    Minute ventilation: 7.57 l/min        MEDS: Reviewed    Chest X-Ray:  CXR Results  (Last 48 hours)    None          I have reviewed the above films and agree with official interpretation         Multidisciplinary Rounds Completed:  Yes      SPECIAL EQUIPMENT  CRRT    DISPOSITION  Stay in ICU    CRITICAL CARE CONSULTANT NOTE  I had a in-person encounter with Harsha Birch, reviewed and interpreted patient data including events, labs, images, vital signs, I/O's, and examined patient. I have discussed the case and the plan and management of the patient's care with the consulting services, the bedside nurses and the respiratory therapist.      NOTE OF PERSONAL INVOLVEMENT IN CARE   This patient is at high risk for sudden and clinically significant deterioration, which requires the highest level of preparedness to intervene urgently.  I participated in the decision-making and personally managed or directed the management of the following life and organ supporting interventions that required my frequent assessment to treat or prevent imminent deterioration. I personally spent --- minutes of critical care time. This is time spent at patient's bedside actively involved in patient care as well as the coordination of care and discussions with the patient's family. This does not include any procedural time which has been billed separately.     Cesar Washburn MD  Pulmonary/[facility]  [phone]  12/14/2021

## 2021-12-14 NOTE — PROGRESS NOTES
Patient demanded to have bertram hose removed from his legs or he would not continue dialysis. Explained to patient the importance of wearing the bertram hose with the SCD and patient states that he understands, but still wants bertram hose off. Stockings removed and SCD replaced. Patient agreeing to keep SCD on.   Erik Sena rounded on patient. Patient tolerating hemodialysis well and maintaining BP.   1020 Interdisciplinary team rounds were held 12/14/2021 with the following team members:Care Management, Nursing, Nutrition, Pharmacy and Physician. Plan of care discussed. See clinical pathway and/or care plan for interventions and desired outcomes. Goals of the Day: complete dialysis  Remove rakel catheter  Transfer out of ICU  Transfuse unit of packed RBC  1039 Dr. Dania Klein in to see patient. 1150 Dialysis completed at this time. 1235  Unit of  Packed RBC started at this time. 1328 Left groin rakel catheter removed at this time per MD order. Hand pressure held to site X 8 minutes, followed by pressure dressing. No oozing or hematoma noted. Patient tolerated well. 1531  Unit of blood completed at this time. 1845 Left groin site remains clean, dry and intact. Patient taking small amounts of po intake and tolerating what he is taking. 1915 End of Shift Note    Bedside shift change report given to [address] [place] [address] (oncoming nurse) by Annalee Birch RN (offgoing nurse).   Report included the following information SBAR, Kardex, MAR, Accordion and Recent Results    Shift worked:  7am to [facility]     Shift summary and any significant changes:     see above note     Concerns for physician to address: None at this time   Zone phone for oncoming shift:   n/a       Activity:  Activity Level: Bed Rest  Number times ambulated in hallways past shift: 0  Number of times OOB to chair past shift: 0    Cardiac:   Cardiac Monitoring: Yes      Cardiac Rhythm: Sinus Tachy    Access:   Current line(s): central line     Genitourinary: Urinary status: anuric    Respiratory:   O2 Device: Nasal cannula  Chronic home O2 use?: NO  Incentive spirometer at bedside: NO     GI:  Last Bowel Movement Date: 12/13/21  Current diet:  TPN ADULT - CENTRAL AA 5% D15% W/ ELECTROLYTES AND CA  ADULT DIET Clear Liquid  Passing flatus: NO  Tolerating current diet: YES       Pain Management:   Patient states pain is manageable on current regimen: YES    Skin:  Yong Score: 15  Interventions: turn team and float heels    Patient Safety:  Fall Score:  Total Score: 3  Interventions: bed/chair alarm  High Fall Risk: Yes    Length of Stay:  Expected LOS: 4d 21h  Actual LOS: [address] RN

## 2021-12-14 NOTE — PROGRESS NOTES
GI PROGRESS NOTE  Ganesh Marin NP  [phone] NP in-hospital cell phone M-F until 4:30  After 5pm or on weekends, please call  for physician on call    NAME:Claude Marton Forget :1975 OFD:[id number]   ATTG: Dr Elidia Almeida PCP: Flores Rasmussen MD Date/Time:  2021 0925 AM     Primary GI: Dr. Toño Kirkpatrick - Dr Hitesh Mckinney covering     Assessment:   Acute onset of Hematemesis - 2/2 ulcers in distal esophagus- severe   Acute Blood Loss anemia - hgb 6.9  Melena  Chronic thrombocytopenia - Plt 67  On Warfarin and ASA 81mg - last dose 21 PM  Hyperkalemia - resolved  · Patient with continued melena   · INR 1.0   · 2021:  CT abd/pel showed generalized gaseous distention of large and small bowel. Findings are consistent with a generalized ileus. No evidence of bowel perforation. Continued gastric distention with hemorrhagic material, similar to prior study. · S/p extubation, off pressors     EGD 21 : Esophagus:  Full of fresh blood and large clots. Eventually cleared and two ulcers (10 mm) and (5 mm) seen in distal esophagus at 42 cm. Very friable, but no active bleeding. Injected Epi 1 : 10,000 total of 8.5 cc into area around both ulcers. Stomach: Full of huge clots, so only about 20 % of mucosa seen. Clots in stomach are dark and look old. Cannot find pylorus. Duodenum: not entered due to blood obscuring    EGD 2021:  Esophagus:  Upper and mid esophagus appears normal. Distal esophagus looks diffusely ulcerated with large amount of old clot. No active bleeding seen. Stomach: FULL of old blood, large clots. Cannot evaluate gastric mucosa. Pylorus found and patent. Scope was able to be advanced into duodenum  Duodenum: normal bulb and second portion. No ulcers seen     Bacteremia - Gram + sepsis with dental abscess  COVID+ -  Admitted 21  ESRD on dialysis  Chronic pain  HTN     Plan:   · No direct plans for repeat EGD at this time.   Source of bleeding found during recent EGD, not able to treat endoscopically. Recommend medical management. · Continue acid suppression  · Follow CBC - transfuse with blood, plts, ffp as necessary  · Hold all anticoagulation - previously on coumadin  · Clear liquid diet   · Recommend Reglan, however patient refuses   · Symptomatic care per primary team   · Nothing further to add from a GI standpoint. GI signing-off. Please call with any questions. Thank you for this consult. *Plan of care discussed with Dr. Moon Ramirez      Subjective:   Discussed with RN events overnight. Resting in room. Extubated       Review of Systems:  *COVID +*    Objective:   VITALS:   Last 24hrs VS reviewed since prior progress note. Most recent are:  Visit Vitals  BP (!) 141/87   Pulse 99   Temp 99.2 °F (37.3 °C)   Resp 26   Ht 5' 7\" (1.702 m)   Wt 67.9 kg (149 lb 11.1 oz)   SpO2 100%   BMI 23.45 kg/m²       Intake/Output Summary (Last 24 hours) at 12/14/2021 1421  Last data filed at 12/14/2021 1150  Gross per 24 hour   Intake 1893 ml   Output 2576 ml   Net -683 ml     PHYSICAL EXAM:  ~~Deferred secondary to + COVID~~    Lab and Radiology Data Reviewed: (see below)    Medications Reviewed: (see below)  PMH/SH reviewed - no change compared to H&P  ________________________________________________________________________  Total time spent with patient: 20 minutes ________________________________________________________________________  Care Plan discussed with:  Patient    Family     RN x              Consultant:  Fam Medina NP     Procedures: see electronic medical records for all procedures/Xrays and details which were not copied into this note but were reviewed prior to creation of Plan. LABS:  Recent Labs     12/14/21  0513 12/13/21  2037 12/13/21  1130 12/13/21  0406   WBC 9.5  --   --  15.7*   HGB 6.9* 7.2*   < > 7.4*   HCT 22.2*  --   --  22.7*   PLT 67*  --   --  66*    < > = values in this interval not displayed.      Recent Labs     12/14/21  8774 12/13/21 2037 12/13/21 0406    139 138   K 4.1 4.0 3.9    107 106   CO2 27 25 27   BUN 49* 43* 39*   CREA 3.94* 3.36* 2.97*   * 103* 99   CA 8.0* 8.2* 8.2*   MG 2.7* 2.4 2.6*   PHOS 3.3 2.9 2.5*     Recent Labs     12/13/21  0406   *   TP 5.5*   ALB 1.8*   GLOB 3.7     No results for input(s): INR, PTP, APTT, INREXT, INREXT in the last 72 hours. No results for input(s): FE, TIBC, PSAT, FERR in the last 72 hours. Lab Results   Component Value Date/Time    Folate 7.5 11/23/2011 11:26 AM     No results for input(s): PH, PCO2, PO2 in the last 72 hours. No results for input(s): CPK, CKMB in the last 72 hours.     No lab exists for component: TROPONINI  Lab Results   Component Value Date/Time    Color RED 11/16/2012 05:15 PM    Appearance OPAQUE 11/16/2012 05:15 PM    Specific gravity 1.020 11/16/2012 05:15 PM    Specific gravity 1.011 09/17/2012 01:35 AM    pH (UA) 8.0 11/16/2012 05:15 PM    Protein >300 (A) 11/16/2012 05:15 PM    Glucose 100 (A) 11/16/2012 05:15 PM    Ketone NEGATIVE  11/16/2012 05:15 PM    Bilirubin NEGATIVE  09/17/2012 01:35 AM    Urobilinogen 0.2 11/16/2012 05:15 PM    Nitrites NEGATIVE  11/16/2012 05:15 PM    Leukocyte Esterase NEGATIVE  11/16/2012 05:15 PM    Epithelial cells 5-10 11/16/2012 05:15 PM    Bacteria 3+ (A) 11/16/2012 05:15 PM    WBC >100 (H) 11/16/2012 05:15 PM    RBC >100 (H) 11/16/2012 05:15 PM       MEDICATIONS:  Current Facility-Administered Medications   Medication Dose Route Frequency    piperacillin-tazobactam (ZOSYN) 3.375 g in 0.9% sodium chloride (MBP/ADV) 100 mL MBP  3.375 g IntraVENous Q12H    VANCOMYCIN INFORMATION NOTE   Other Rx Dosing/Monitoring    0.9% sodium chloride infusion 250 mL  250 mL IntraVENous PRN    TPN ADULT - CENTRAL AA 5% D15% W/ ELECTROLYTES AND CA   IntraVENous CONTINUOUS    TPN ADULT - CENTRAL AA 5% D15% W/ ELECTROLYTES AND CA   IntraVENous CONTINUOUS    VANCOMYCIN INFORMATION NOTE   Other ONCE    balsam peru-castor oiL (VENELEX) ointment   Topical Q12H    morphine injection 2 mg  2 mg IntraVENous Q4H PRN    TPN ADULT - CENTRAL AA 5% D15% W/ ELECTROLYTES AND CA   IntraVENous CONTINUOUS    0.9% sodium chloride infusion 250 mL  250 mL IntraVENous PRN    epoetin emilie-epbx (RETACRIT) injection 20,000 Units  20,000 Units SubCUTAneous Q TUE, THU & SAT    glucagon (GLUCAGEN) injection 1 mg  1 mg IntraMUSCular PRN    insulin lispro (HUMALOG) injection   SubCUTAneous Q6H    dextrose (D50W) injection syrg 12.5-25 g  12.5-25 g IntraVENous PRN    simethicone (MYLICON) 99XE/4.9GK oral drops 80 mg  1.2 mL Oral Multiple    [Held by provider] calcium acetate(phosphat bind) (PHOSLO) capsule 1,334 mg  2 Capsule Per NG tube TID WITH MEALS    metoclopramide HCl (REGLAN) injection 5 mg  5 mg IntraVENous Q8H    albuterol-ipratropium (DUO-NEB) 2.5 MG-0.5 MG/3 ML  3 mL Nebulization Q4H PRN    pantoprazole (PROTONIX) 40 mg in 0.9% sodium chloride 10 mL injection  40 mg IntraVENous Q12H    albumin human 25% (BUMINATE) solution 25 g  25 g IntraVENous DIALYSIS PRN    alcohol 62% (NOZIN) nasal  1 Ampule  1 Ampule Topical Q12H    chlorhexidine (PERIDEX) 0.12 % mouthwash 15 mL  15 mL Oral Q12H    acetaminophen (TYLENOL) tablet 650 mg  650 mg Oral Q6H PRN    [Held by provider] L.acidophilus-paracasei-S.thermophil-bifidobacter (RISAQUAD) 8 billion cell capsule  1 Capsule Oral DAILY    sodium chloride (NS) flush 5-40 mL  5-40 mL IntraVENous Q8H    sodium chloride (NS) flush 5-40 mL  5-40 mL IntraVENous PRN    polyethylene glycol (MIRALAX) packet 17 g  17 g Oral DAILY PRN    ondansetron (ZOFRAN) injection 4 mg  4 mg IntraVENous Q6H PRN

## 2021-12-14 NOTE — PROGRESS NOTES
Pharmacy Antimicrobial Kinetic Dosing    Indication for Antimicrobials: HAP     Current Regimen of Each Antimicrobial:    12 PM CRRT to HD  Vancomycin HD regimen - started  day 4 of 7     Previous Antimicrobial Therapy:  Zosyn 3.375gm IV q8h - started  day 7    Vancomycin Goal Level:   15    Date Dose & Interval Measured (mcg/mL) Predicted AUC/ZECHARIAH    8:25  21.9     11:30 CRRT dosing 22.3 random      11:39 Post HD level 19.3      Dosing calculator used:  per levels    Significant Positive Cultures:    R Panel - candida albicans, occ GPC   COVID +   BCx - ng final    Conditions for Dosing Consideration:   HD    Labs:  Recent Labs     21  0521  0406   CREA 3.94* 3.36* 2.97*   BUN 49* 43* 39*     Recent Labs     21  0513 21  0406 21  0459   WBC 9.5 15.7* 22.5*     Temp (24hrs), Av.8 °F (37.1 °C), Min:98.1 °F (36.7 °C), Max:100.1 °F (37.8 °C)    Creatinine Clearance (mL/min):   CrCl (Ideal Body Weight): 22.4   If actual weight < IBW: CrCl (Actual Body Weight) 23.0    Impression/Plan:   CRRT to HD yesterday afternoon; prismasol dc'd this am with Vanc given last evening after CRRT stopped, so will draw level after HD to see if needs to be redosed  Afebrile, WNC wnl, Cx ng final  Change to HD regimens: Redose Vancomycin after HD sessions and changed Zosyn HD regimen  No Vanc redosing required since post HD level is therapeutic at 19.3      Antimicrobial stop date - Vanc 7 days, Zosyn pending       Pharmacy will follow daily and adjust medications as appropriate for renal function and/or serum levels.     Thank you,  Donna Newton, [facility]

## 2021-12-14 NOTE — PROGRESS NOTES
Nephrology Progress Note  Blaise Leblanc     [email or web address]. [email or web address]  Phone - [phone]   Patient: Shae Valentino    YOB: 1975        Date- 12/14/2021   Admit Date: 11/27/2021  CC: Follow up for  ESRD        IMPRESSION & PLAN:   · ESRD-- home HD 5 days per week- Follows up with Dr Meli Morin at [facility] ( now [facility] for IV antibiotics.)  · Covid 19 infection  · Hemorrhagic shock  · Acute blood loss anemia from esophageal bleed from esophagitis  · hyperkalemia  · Left arm swelling- s/p angioplasty of left subclavian vein  · Gram positive sepsis from dental abcess  · s/p lead extraction at [facility]  · Anemia of ckd  · Hx of renal tx in past times 2.  · Hypertension  · CAD  · Failed RTX times 2         H/o DVT, s/p ivc filter/ h/o HIT      PLAN-   Plan for Conventional HD today   Can be transferred out of ICU if able to tolerate iHD without any issues with hemodynamics.  Revised goal for UF to 2.5 L   Next HD will be on Thrusday. Subjective: Interval History:   Seen and examined through glass doors  Getting HD without any issues  Spoke to HD RN. Objective:   Vitals:    12/14/21 0800 12/14/21 0820 12/14/21 0830 12/14/21 0845   BP: 131/87 131/87 128/88 124/74   Pulse: (!) 104 100 98 99   Resp: 28 (!) 33 25 (!) 39   Temp: 98.9 °F (37.2 °C) 98.2 °F (36.8 °C)     TempSrc:  Oral     SpO2: 99% 98% 97% 98%   Weight:       Height:          12/13 0701 - 12/14 0700  In: 1735 [I.V.:1735]  Out: 1173   Last 3 Recorded Weights in this Encounter    12/12/21 0100 12/13/21 0800 12/14/21 0400   Weight: 68.2 kg (150 lb 5.7 oz) 67.9 kg (149 lb 11.1 oz) 67.9 kg (149 lb 11.1 oz)      Physical exam:   GEN: on vent  NECK- ET tube +  RESP: no distress  NEURO:Can't access due to patient's current condition         Chart reviewed. Pertinent Notes reviewed.      Data Review :  Recent Labs     12/14/21  0513 12/13/21 2037 12/13/21  0406    139 138   K 4.1 4.0 3.9    107 106   CO2 27 25 27   BUN 49* 43* 39*   CREA 3.94* 3.36* 2.97*   * 103* 99   CA 8.0* 8.2* 8.2*   MG 2.7* 2.4 2.6*   PHOS 3.3 2.9 2.5*     Recent Labs     12/14/21  0513 12/13/21 2037 12/13/21  1130 12/13/21  0406 12/13/21  0406 12/12/21 2109 12/12/21 2109 12/12/21  1215 12/12/21  0459   WBC 9.5  --   --   --  15.7*  --   --   --  22.5*   HGB 6.9* 7.2* 8.0*   < > 7.4*   < > 6.8*   < > 7.4*   HCT 22.2*  --   --   --  22.7*  --  21.2*  --  23.2*   PLT 67*  --   --   --  66*  --   --   --  69*    < > = values in this interval not displayed. No results for input(s): FE, TIBC, PSAT, FERR in the last 72 hours.    Medication list  reviewed  Current Facility-Administered Medications   Medication Dose Route Frequency    piperacillin-tazobactam (ZOSYN) 3.375 g in 0.9% sodium chloride (MBP/ADV) 100 mL MBP  3.375 g IntraVENous Q12H    VANCOMYCIN INFORMATION NOTE   Other Rx Dosing/Monitoring    balsam peru-castor oiL (VENELEX) ointment   Topical Q12H    morphine injection 2 mg  2 mg IntraVENous Q4H PRN    TPN ADULT - CENTRAL AA 5% D15% W/ ELECTROLYTES AND CA   IntraVENous CONTINUOUS    0.9% sodium chloride infusion 250 mL  250 mL IntraVENous PRN    bicarbonate dialysis (PRISMASOL) BG K 4/Ca 2.5 5000 ml solution   Extracorporeal DIALYSIS CONTINUOUS    epoetin emilie-epbx (RETACRIT) injection 20,000 Units  20,000 Units SubCUTAneous Q TUE, THU & SAT    glucagon (GLUCAGEN) injection 1 mg  1 mg IntraMUSCular PRN    insulin lispro (HUMALOG) injection   SubCUTAneous Q6H    dextrose (D50W) injection syrg 12.5-25 g  12.5-25 g IntraVENous PRN    simethicone (MYLICON) 38BY/3.2YQ oral drops 80 mg  1.2 mL Oral Multiple    [Held by provider] calcium acetate(phosphat bind) (PHOSLO) capsule 1,334 mg  2 Capsule Per NG tube TID WITH MEALS    metoclopramide HCl (REGLAN) injection 5 mg  5 mg IntraVENous Q8H    albuterol-ipratropium (DUO-NEB) 2.5 MG-0.5 MG/3 ML  3 mL Nebulization Q4H PRN    pantoprazole (PROTONIX) 40 mg in 0.9% sodium chloride 10 mL injection  40 mg IntraVENous Q12H    albumin human 25% (BUMINATE) solution 25 g  25 g IntraVENous DIALYSIS PRN    alcohol 62% (NOZIN) nasal  1 Ampule  1 Ampule Topical Q12H    chlorhexidine (PERIDEX) 0.12 % mouthwash 15 mL  15 mL Oral Q12H    acetaminophen (TYLENOL) tablet 650 mg  650 mg Oral Q6H PRN    [Held by provider] L.acidophilus-paracasei-S.thermophil-bifidobacter (RISAQUAD) 8 billion cell capsule  1 Capsule Oral DAILY    sodium chloride (NS) flush 5-40 mL  5-40 mL IntraVENous Q8H    sodium chloride (NS) flush 5-40 mL  5-40 mL IntraVENous PRN    polyethylene glycol (MIRALAX) packet 17 g  17 g Oral DAILY PRN    ondansetron (ZOFRAN) injection 4 mg  4 mg IntraVENous Q6H PRN          Alessandro Herring, [id number] [facility]  [facility] / [facility]  Lizette Jerez , Toyin DuttaLoma Linda Veterans Affairs Medical Center, [address]  Phone - [phone]               Fax - [phone]

## 2021-12-14 NOTE — PROGRESS NOTES
Comprehensive Nutrition Assessment    Type and Reason for Visit: Reassess    Nutrition Recommendations/Plan:   TPN recommendations:   12/14) Increase TPN Goal Rate to 75mL/h   12/15) If BG <200mg/dL and lytes WNL, advance to Goal Rate of 100mL/h (provides 1704kcals/120gPro/360gDextrose)     Nutrition Assessment:   Chart reviewed, case discussed during CCU rounds. Pt remains COVID +. BG well controlled and lytes WNL today. Current TPN bag at 63mL/h. Per IDR discussion with Dr. Royal juarez to increase TPN goal rate to 75mL/h tonight. TG level was ~300 on 12/9, would hold off on lipids at this time. GI signed off, will continue to explore daily when pt will be ready for PO/enteral nutrition. Estimated Daily Nutrient Needs:  Energy (kcal): MSJ 2000 (1528 x 1.3); Weight Used for Energy Requirements: Current  Protein (g): 86-143g (1.2-2gPro/kg); Weight Used for Protein Requirements: Current  Fluid (ml/day): 1500mL; Method Used for Fluid Requirements: Standard renal      Nutrition Related Findings:  Meds: humalog, reglan, protonix, zosyn, vancomycin. Edema: +2-BLE, LUE, trace-RUE. BM 12/13      Wounds:    Skin tears       Current Nutrition Therapies:  Current Parenteral Nutrition Orders:  · Type and Formula: D15, 5% AA   · Lipids: None  · Duration: Continuous  · Rate/Volume: 63ml/h  · Current PN Order Provides: 1074kcals/76gPro  · Goal PN Orders Provides: 1704kcals/120gPro/360gDextrose      Anthropometric Measures:  · Height:  5' 7\" (170.2 cm)  · Current Body Wt:  67.9 kg (149 lb 11.1 oz)   · Ideal Body Wt:  148 lbs:  94.1 %   · BMI Category:  Normal weight (BMI 18.5-24. 9)       Nutrition Diagnosis:   · Inadequate protein-energy intake related to renal dysfunction, increased demand for energy/nutrients (poor appetite) as evidenced by nutrition support-parenteral nutrition, NPO or clear liquid status due to medical condition  Previous dx resolving, TPN advancing.      Nutrition Interventions:   Food and/or Nutrient Delivery: Continue NPO, Modify parenteral nutrition  Nutrition Education and Counseling: No recommendations at this time  Coordination of Nutrition Care: Continue to monitor while inpatient, Interdisciplinary rounds    Goals:  Pt will continue to tolerate TPN with stable lytes and BG and trend towards goal rate next 2-4 days       Nutrition Monitoring and Evaluation:   Behavioral-Environmental Outcomes: None identified  Food/Nutrient Intake Outcomes: Parenteral nutrition intake/tolerance  Physical Signs/Symptoms Outcomes: Biochemical data, GI status, Fluid status or edema, Weight    Discharge Planning:     Too soon to determine     Electronically signed by Lulú Krishnan RD, [address]  on 12/14/2021 at 2:02 PM    Contact: [phone]

## 2021-12-14 NOTE — PROGRESS NOTES
Mr. Chalino Liu with history of ESRD on hemodialysis, recurrent DVT, status post IVC filter 2011 and on chronic warfarin, history of V. tach, status post AICD who initially admitted to hospitalist service 11/27 with acute Covid infection was subsequently transferred to ICU on 12/06 for hematemesis and shock and was semielectively intubated for EGD. Required vasopressors, received multiple cryo, PRBC, plasma, platelets.    -Upper GI bleed/hemorrhagic shock/PUD-as per GI not able to treat endoscopically. ,  If he bleeds significantly will need surgical intervention. Currently getting 1 unit of PRBC   -ESRD on HD-was receiving CRRT in ICU, currently HD, nephrology following  -COVID-19 positive-currently on 2 L nasal cannula, saturation 99%  -Holding warfarin  -History of DVT, status post IVC filter  -History of anemia of CKD    Patient currently in ICU, plan to transfer to stepdown. Pt seen by ICU intensivist. Hospitalist team will assume care.  Will round tomorrow unless needed today

## 2021-12-14 NOTE — DIALYSIS
Hemodialysis / [phone]    Vitals Pre Post Assessment Pre Post   BP BP: 131/87 (12/14/21 0820) 133/83 LOC Alert and oriented *4 No change   HR Pulse (Heart Rate): 100 (12/14/21 0820) 93 Lungs coarse No change   Resp Resp Rate: (!) 33 (12/14/21 0820) 32 Cardiac Regular monitored in the room No change   Temp Temp: 98.2 °F (36.8 °C) (12/14/21 0820) 98.4 Skin Warm dry and intact No change   Weight Pre-Dialysis Weight: 66.7 kg (147 lb) (12/02/21 0743)  Edema Facial and lower ext No change   Tele status   Pain Pain Intensity 1: 3 (12/14/21 0800)      Orders   Duration: Start: 0820 End: 1150 Total: 3.5 hours   Dialyzer: Dialyzer/Set Up Inspection: Cynda Clipper (12/14/21 0820)   K Bath: Dialysate K (mEq/L): 3 (12/14/21 0820)   Ca Bath: Dialysate CA (mEq/L): 2.5 (12/14/21 0820)   Na: Dialysate NA (mEq/L): 138 (12/14/21 0820)   Bicarb: Dialysate HCO3 (mEq/L): 35 (12/14/21 0820)   Target Fluid Removal: Goal/Amount of Fluid to Remove (mL): 1500 mL (12/14/21 0820)     Access   Type & Location: RIJ CVC: Dressing CDI. No s/s of infection. Both lumens aspirate & flush well. Running well at .     Comments:                                        Labs   HBsAg (Antigen) / date:    Negatve as of 12/1/21                                           HBsAb (Antibody) / date: Immune as of 11/11/21   Source:    Obtained/Reviewed  Critical Results Called HGB   Date Value Ref Range Status   12/14/2021 6.9 (L) 12.1 - 17.0 g/dL Final     Potassium   Date Value Ref Range Status   12/14/2021 4.1 3.5 - 5.1 mmol/L Final     Calcium   Date Value Ref Range Status   12/14/2021 8.0 (L) 8.5 - 10.1 MG/DL Final     BUN   Date Value Ref Range Status   12/14/2021 49 (H) 6 - 20 MG/DL Final     Creatinine   Date Value Ref Range Status   12/14/2021 3.94 (H) 0.70 - 1.30 MG/DL Final        Meds Given   Name Dose Route                    Adequacy / Fluid    Total Liters Process: 67.1   Net Fluid Removed: 2500 ml      Comments   Time Out Done:   (6155) Admitting Diagnosis: COVID    Consent obtained/signed: Informed Consent Verified: Yes (12/14/21 0820)   Machine / RO # Machine Number: Reema Victoria (12/14/21 0820)   Primary Nurse Rpt Pre: Ferny Abraham   Primary Nurse Rpt Post: Ferny Abraham   Pt Education: yes   Care Plan: Continue hd   Pts outpatient clinic:      Tx Summary      SBAR received from Primary  Consent signed & on file. 0820: Each catheter limb disinfected per p&p, caps removed, hubs disinfected per p&p. Each lumen aspirated for blood return and flushed with Normal Saline per policy. Labs drawn per request/ order. VSS. Dialysis Tx initiated. 0950 md increased uf goal to 2500 ml    1150: Tx ended. VSS. Each dialysis catheter limb disinfected per p&p, all possible blood returned per p&p, and each dialysis hub disinfected per p&p. Each lumen flushed, post dialysis catheter Heparin dwell instilled per order, and caps applied. Bed locked and in the lowest position, call bell and belongings in reach. SBAR given to Primary, RN. Patient is stable at time of their/ my departure. All Dialysis related medications have been reviewed.        Comments:

## 2021-12-15 LAB
ABO + RH BLD: NORMAL
ALBUMIN SERPL-MCNC: 1.8 G/DL (ref 3.5–5)
ALBUMIN/GLOB SERPL: 0.5 {RATIO} (ref 1.1–2.2)
ALP SERPL-CCNC: 141 U/L (ref 45–117)
ALT SERPL-CCNC: 54 U/L (ref 12–78)
ANION GAP SERPL CALC-SCNC: 7 MMOL/L (ref 5–15)
AST SERPL-CCNC: 34 U/L (ref 15–37)
BASOPHILS # BLD: 0.1 K/UL (ref 0–0.1)
BASOPHILS NFR BLD: 1 % (ref 0–1)
BILIRUB DIRECT SERPL-MCNC: 0.4 MG/DL (ref 0–0.2)
BILIRUB SERPL-MCNC: 0.8 MG/DL (ref 0.2–1)
BLD PROD TYP BPU: NORMAL
BLOOD GROUP ANTIBODIES SERPL: NORMAL
BPU ID: NORMAL
BUN SERPL-MCNC: 40 MG/DL (ref 6–20)
BUN/CREAT SERPL: 11 (ref 12–20)
CALCIUM SERPL-MCNC: 8.2 MG/DL (ref 8.5–10.1)
CHLORIDE SERPL-SCNC: 103 MMOL/L (ref 97–108)
CO2 SERPL-SCNC: 27 MMOL/L (ref 21–32)
CREAT SERPL-MCNC: 3.5 MG/DL (ref 0.7–1.3)
CROSSMATCH RESULT,%XM: NORMAL
DIFFERENTIAL METHOD BLD: ABNORMAL
EOSINOPHIL # BLD: 0.3 K/UL (ref 0–0.4)
EOSINOPHIL NFR BLD: 3 % (ref 0–7)
ERYTHROCYTE [DISTWIDTH] IN BLOOD BY AUTOMATED COUNT: 19.8 % (ref 11.5–14.5)
GLOBULIN SER CALC-MCNC: 4 G/DL (ref 2–4)
GLUCOSE BLD STRIP.AUTO-MCNC: 105 MG/DL (ref 65–117)
GLUCOSE BLD STRIP.AUTO-MCNC: 113 MG/DL (ref 65–117)
GLUCOSE BLD STRIP.AUTO-MCNC: 144 MG/DL (ref 65–117)
GLUCOSE BLD STRIP.AUTO-MCNC: 80 MG/DL (ref 65–117)
GLUCOSE BLD STRIP.AUTO-MCNC: 81 MG/DL (ref 65–117)
GLUCOSE SERPL-MCNC: 233 MG/DL (ref 65–100)
HCT VFR BLD AUTO: 25.2 % (ref 36.6–50.3)
HGB BLD-MCNC: 8 G/DL (ref 12.1–17)
HGB BLD-MCNC: 8.1 G/DL (ref 12.1–17)
HGB BLD-MCNC: 8.6 G/DL (ref 12.1–17)
IMM GRANULOCYTES # BLD AUTO: 0.1 K/UL (ref 0–0.04)
IMM GRANULOCYTES NFR BLD AUTO: 1 % (ref 0–0.5)
INR PPP: 1.2 (ref 0.9–1.1)
LYMPHOCYTES # BLD: 1 K/UL (ref 0.8–3.5)
LYMPHOCYTES NFR BLD: 11 % (ref 12–49)
MAGNESIUM SERPL-MCNC: 2.6 MG/DL (ref 1.6–2.4)
MCH RBC QN AUTO: 29.8 PG (ref 26–34)
MCHC RBC AUTO-ENTMCNC: 32.1 G/DL (ref 30–36.5)
MCV RBC AUTO: 92.6 FL (ref 80–99)
MONOCYTES # BLD: 1.2 K/UL (ref 0–1)
MONOCYTES NFR BLD: 14 % (ref 5–13)
NEUTS SEG # BLD: 6.4 K/UL (ref 1.8–8)
NEUTS SEG NFR BLD: 71 % (ref 32–75)
NRBC # BLD: 0.09 K/UL (ref 0–0.01)
NRBC BLD-RTO: 1 PER 100 WBC
PHOSPHATE SERPL-MCNC: 3.8 MG/DL (ref 2.6–4.7)
PLATELET # BLD AUTO: 69 K/UL (ref 150–400)
POTASSIUM SERPL-SCNC: 4.3 MMOL/L (ref 3.5–5.1)
PROT SERPL-MCNC: 5.8 G/DL (ref 6.4–8.2)
PROTHROMBIN TIME: 12.4 SEC (ref 9–11.1)
RBC # BLD AUTO: 2.72 M/UL (ref 4.1–5.7)
SERVICE CMNT-IMP: ABNORMAL
SERVICE CMNT-IMP: NORMAL
SODIUM SERPL-SCNC: 137 MMOL/L (ref 136–145)
SPECIMEN EXP DATE BLD: NORMAL
STATUS OF UNIT,%ST: NORMAL
UNIT DIVISION, %UDIV: 0
WBC # BLD AUTO: 9 K/UL (ref 4.1–11.1)

## 2021-12-15 PROCEDURE — 97530 THERAPEUTIC ACTIVITIES: CPT

## 2021-12-15 PROCEDURE — 74011250637 HC RX REV CODE- 250/637: Performed by: INTERNAL MEDICINE

## 2021-12-15 PROCEDURE — 97168 OT RE-EVAL EST PLAN CARE: CPT

## 2021-12-15 PROCEDURE — 80076 HEPATIC FUNCTION PANEL: CPT

## 2021-12-15 PROCEDURE — 74011250637 HC RX REV CODE- 250/637: Performed by: NURSE PRACTITIONER

## 2021-12-15 PROCEDURE — 74011250637 HC RX REV CODE- 250/637: Performed by: HOSPITALIST

## 2021-12-15 PROCEDURE — 85025 COMPLETE CBC W/AUTO DIFF WBC: CPT

## 2021-12-15 PROCEDURE — 36415 COLL VENOUS BLD VENIPUNCTURE: CPT

## 2021-12-15 PROCEDURE — C9113 INJ PANTOPRAZOLE SODIUM, VIA: HCPCS | Performed by: INTERNAL MEDICINE

## 2021-12-15 PROCEDURE — 74011000250 HC RX REV CODE- 250: Performed by: INTERNAL MEDICINE

## 2021-12-15 PROCEDURE — 83735 ASSAY OF MAGNESIUM: CPT

## 2021-12-15 PROCEDURE — 80048 BASIC METABOLIC PNL TOTAL CA: CPT

## 2021-12-15 PROCEDURE — 74011250637 HC RX REV CODE- 250/637: Performed by: EMERGENCY MEDICINE

## 2021-12-15 PROCEDURE — 84100 ASSAY OF PHOSPHORUS: CPT

## 2021-12-15 PROCEDURE — 65660000000 HC RM CCU STEPDOWN

## 2021-12-15 PROCEDURE — 85610 PROTHROMBIN TIME: CPT

## 2021-12-15 PROCEDURE — 74011250636 HC RX REV CODE- 250/636: Performed by: INTERNAL MEDICINE

## 2021-12-15 PROCEDURE — 74011636637 HC RX REV CODE- 636/637: Performed by: INTERNAL MEDICINE

## 2021-12-15 PROCEDURE — 85018 HEMOGLOBIN: CPT

## 2021-12-15 PROCEDURE — 74011000250 HC RX REV CODE- 250: Performed by: HOSPITALIST

## 2021-12-15 PROCEDURE — 82962 GLUCOSE BLOOD TEST: CPT

## 2021-12-15 PROCEDURE — 97164 PT RE-EVAL EST PLAN CARE: CPT

## 2021-12-15 PROCEDURE — 74011250636 HC RX REV CODE- 250/636: Performed by: HOSPITALIST

## 2021-12-15 PROCEDURE — 97535 SELF CARE MNGMENT TRAINING: CPT

## 2021-12-15 RX ORDER — OXYCODONE HYDROCHLORIDE 5 MG/1
5 TABLET ORAL
Status: DISCONTINUED | OUTPATIENT
Start: 2021-12-15 | End: 2021-12-24 | Stop reason: HOSPADM

## 2021-12-15 RX ORDER — OXYCODONE HYDROCHLORIDE 5 MG/1
10 TABLET ORAL
Status: DISCONTINUED | OUTPATIENT
Start: 2021-12-15 | End: 2021-12-24 | Stop reason: HOSPADM

## 2021-12-15 RX ADMIN — SODIUM CHLORIDE 40 MG: 9 INJECTION INTRAMUSCULAR; INTRAVENOUS; SUBCUTANEOUS at 21:18

## 2021-12-15 RX ADMIN — SODIUM CHLORIDE 40 MG: 9 INJECTION INTRAMUSCULAR; INTRAVENOUS; SUBCUTANEOUS at 09:05

## 2021-12-15 RX ADMIN — METOCLOPRAMIDE HYDROCHLORIDE 5 MG: 5 INJECTION INTRAMUSCULAR; INTRAVENOUS at 05:15

## 2021-12-15 RX ADMIN — MORPHINE SULFATE 2 MG: 2 INJECTION, SOLUTION INTRAMUSCULAR; INTRAVENOUS at 02:56

## 2021-12-15 RX ADMIN — ACETAMINOPHEN 650 MG: 325 TABLET ORAL at 05:08

## 2021-12-15 RX ADMIN — DIPHENHYDRAMINE HYDROCHLORIDE 25 MG: 25 CAPSULE ORAL at 21:20

## 2021-12-15 RX ADMIN — MORPHINE SULFATE 2 MG: 2 INJECTION, SOLUTION INTRAMUSCULAR; INTRAVENOUS at 07:00

## 2021-12-15 RX ADMIN — CHLORHEXIDINE GLUCONATE 15 ML: 1.2 RINSE ORAL at 09:05

## 2021-12-15 RX ADMIN — POTASSIUM PHOSPHATE, MONOBASIC POTASSIUM PHOSPHATE, DIBASIC: 224; 236 INJECTION, SOLUTION, CONCENTRATE INTRAVENOUS at 19:57

## 2021-12-15 RX ADMIN — OXYCODONE 10 MG: 5 TABLET ORAL at 21:19

## 2021-12-15 RX ADMIN — OXYCODONE 10 MG: 5 TABLET ORAL at 11:48

## 2021-12-15 RX ADMIN — Medication 10 ML: at 05:08

## 2021-12-15 RX ADMIN — Medication 1 AMPULE: at 21:19

## 2021-12-15 RX ADMIN — Medication 10 ML: at 21:21

## 2021-12-15 RX ADMIN — Medication 40 ML: at 16:35

## 2021-12-15 RX ADMIN — CHLORHEXIDINE GLUCONATE 15 ML: 1.2 RINSE ORAL at 21:18

## 2021-12-15 RX ADMIN — INSULIN LISPRO 2 UNITS: 100 INJECTION, SOLUTION INTRAVENOUS; SUBCUTANEOUS at 00:28

## 2021-12-15 RX ADMIN — Medication 1 AMPULE: at 09:05

## 2021-12-15 RX ADMIN — Medication: at 21:19

## 2021-12-15 RX ADMIN — OXYCODONE 10 MG: 5 TABLET ORAL at 16:47

## 2021-12-15 RX ADMIN — Medication: at 09:06

## 2021-12-15 RX ADMIN — METOCLOPRAMIDE HYDROCHLORIDE 5 MG: 5 INJECTION INTRAMUSCULAR; INTRAVENOUS at 16:34

## 2021-12-15 RX ADMIN — METOCLOPRAMIDE HYDROCHLORIDE 5 MG: 5 INJECTION INTRAMUSCULAR; INTRAVENOUS at 21:19

## 2021-12-15 NOTE — PROGRESS NOTES
1 - Spoke with Dr. Dhara Pisano re: femoral line, will discuss/consult with anesthesia and re-assess in AM.

## 2021-12-15 NOTE — PROGRESS NOTES
Problem: Self Care Deficits Care Plan (Adult)  Goal: *Acute Goals and Plan of Care (Insert Text)  Description:   FUNCTIONAL STATUS PRIOR TO ADMISSION: Patient was independent and active without use of DME. Pt reports doing his own home HD.    HOME SUPPORT: The patient lived with sister but did not require assist.    Occupational Therapy Goals  Initiated 11/28/2021; Re-evaluated 12/15/2021 Goals updated below  1. Patient will perform grooming standing sinkside with CGA/minimal assist within 7 day(s). 2.  Patient will perform bathing in supported sitting with supervision/set-up within 7 day(s). UPGRADE: independence  3. Patient will perform lower body dressing with minimal assistance/contact guard assist within 7 day(s). UPGRADE: independence  4. Patient will perform toilet transfers with minimal assistance/contact guard assist within 7 day(s). UPGRADE: independence  5. Patient will perform all aspects of toileting with supervision/set-up within 7 day(s). UPGRADE: independence  6. Patient will participate in upper extremity therapeutic exercise/activities with supervision/set-up for 15 minutes within 7 day(s). 7.  Patient will utilize energy conservation techniques during functional activities with verbal cues within 7 day(s). 12/15/2021 1424 by Cesia Kuhn OT  Outcome: Progressing Towards Goal     OCCUPATIONAL THERAPY RE-EVALUATION  Patient: Misha Campbell (65 y.o. male)  Date: 12/15/2021  Diagnosis: Acute COVID-19 [U07.1] <principal problem not specified>  Procedure(s) (LRB):  ESOPHAGOGASTRODUODENOSCOPY (EGD) (N/A) 7 Days Post-Op  Precautions: Contact,Fall,Skin (COVID)  Chart, occupational therapy assessment, plan of care, and goals were reviewed.     ASSESSMENT  Based on the objective data described below, pt presents with slightly decreased balance and coordination, generalized deconditioning, and decreased endurance following [id number] dx complicated by hematemesis, bleeding, and functional decline resulting in intubation (extubated 12/11). Pt rc'd supine in bed on room air, agreeable to session. Pt transitioned to edge of bed with ease, able to don socks with DOVER/SPV. Following pt transition > recliner chair with no sig drop in O2 noted. Pt engaged in UB/LB bathing with overall DOVER/SPV cues for energy conservation. Pt reporting fatigue, encouraged to take rest breaks PRN however O2 remaining within normal range on RA. Pt remains below baseline LOF, recommending IPR to further address occupational deficits and maximize safety. Current Level of Function Impacting Discharge (ADLs): DOVER/SPV - min A; mod A for toileting     Other factors to consider for discharge: pt is well below baseline, fall risk          PLAN :  Recommendations and Planned Interventions: self care training, functional mobility training, therapeutic exercise, balance training, therapeutic activities, endurance activities, patient education, home safety training, and family training/education    Frequency/Duration: Patient will be followed by occupational therapy 3 times a week to address goals. Recommend with staff: Fatimah Allison for meals 3x/day    Recommend next OT session: toileting     Recommendation for discharge: (in order for the patient to meet his/her long term goals)  Therapy 3 hours per day 5-7 days per week    This discharge recommendation:  Has been made in collaboration with the attending provider and/or case management    Equipment recommendations for successful discharge (if) home: TBD        SUBJECTIVE:   Patient stated I am just so fatigued .     OBJECTIVE DATA SUMMARY:   Hospital course since last seen and reason for reevaluation: UBBYF-40 dx complicated by hematemesis, bleeding, and functional decline resulting in intubation (extubated 12/11)    Cognitive/Behavioral Status:  Neurologic State: Alert  Orientation Level: Oriented to time  Cognition: Follows commands; Appropriate decision making  Perception: Appears intact  Perseveration: No perseveration noted  Safety/Judgement: Awareness of environment    Skin: intact    Edema: BLE edema     Hearing: Auditory  Auditory Impairment: None    Vision/Perceptual:                           Acuity: Within Defined Limits         Range of Motion:    AROM: Generally decreased, functional  PROM: Generally decreased, functional                      Strength:    Strength: Generally decreased, functional                Coordination:  Coordination: Generally decreased, functional  Fine Motor Skills-Upper: Left Intact; Right Intact    Gross Motor Skills-Upper: Left Intact; Right Intact    Tone & Sensation:  Tone: Normal  Sensation: Intact                        Functional Mobility and Transfers for ADLs:  Bed Mobility:  Rolling: Supervision  Supine to Sit: Supervision  Scooting: Supervision    Transfers:  Sit to Stand: Contact guard assistance          Balance:  Sitting: Intact  Standing: Impaired  Standing - Static: Fair;Constant support  Standing - Dynamic : Fair;Constant support    ADL Assessment:  Feeding: Setup    Oral Facial Hygiene/Grooming: Setup    Bathing: Minimum assistance    Upper Body Dressing: Setup    Lower Body Dressing: Setup    Toileting: Minimum assistance                ADL Intervention and task modifications:            Upper Body Bathing  Bathing Assistance: Set-up; Supervision  Position Performed: Seated in chair    Lower Body Bathing  Bathing Assistance: Set-up; Supervision  Position Performed: Seated in chair;Standing    Upper Body [address]: Set-up; Supervision    Lower Body Dressing Assistance  Socks: Set-up; Supervision         Cognitive Retraining  Safety/Judgement: Awareness of environment      Functional Measure:    Barthel Index:  Bathin  Bladder: 10  Bowels: 10  Groomin  Dressing: 10  Feeding: 10  Mobility: 0  Stairs: 0  Toilet Use: 10  Transfer (Bed to Chair and Back): 10  Total: 65/100      The Barthel ADL Index: Guidelines  1. The index should be used as a record of what a patient does, not as a record of what a patient could do. 2. The main aim is to establish degree of independence from any help, physical or verbal, however minor and for whatever reason. 3. The need for supervision renders the patient not independent. 4. A patient's performance should be established using the best available evidence. Asking the patient, friends/relatives and nurses are the usual sources, but direct observation and common sense are also important. However direct testing is not needed. 5. Usually the patient's performance over the preceding 24-48 hours is important, but occasionally longer periods will be relevant. 6. Middle categories imply that the patient supplies over 50 per cent of the effort. 7. Use of aids to be independent is allowed. Score Interpretation (from [address] [address])    Independent   60-79 Minimally independent   40-59 Partially dependent   20-39 Very dependent   <20 Totally dependent     -Edyta Harden., Barthel, DBronwynW. (1965). Functional evaluation: the Barthel Index. [address] [facility] ([address] (1997). The Barthel activities of daily living index: self-reporting versus actual performance in the old (> or = 75 years). Journal [address] 45(7), 14 [facility], BronwynBronwynBronwyn, Elodia Agarwal., [facility] . (). Measuring the change in disability after inpatient rehabilitation; comparison of the responsiveness of the Barthel Index and Functional [place] Measure. Journal of Neurology, Neurosurgery, and Psychiatry, 66(4), 880-882. Angeles Tripp, N.[place].A, GAMAL Payton, & Edwar Puga MBronwynA. (2004) Assessment of post-stroke quality of life in cost-effectiveness studies: The usefulness of the Barthel Index and the EuroQoL-5D. Quality of Life Research, 13, 427-43        Pain:  Pt did not endorse pain during session.      Activity Tolerance:   Fair and requires rest breaks    After treatment patient left in no apparent distress:   Sitting in chair and Call bell within reach    COMMUNICATION/COLLABORATION:   The patients plan of care was discussed with: Physical therapist, Occupational therapist, and Registered nurse.      Caron Leos OT  Time Calculation: 32 mins

## 2021-12-15 NOTE — PROGRESS NOTES
Nephrology Progress Note  Blaise Leblanc     [facility]. [facility]  Phone - [phone]   Patient: Augustina Enriquez    YOB: 1975        Date- 12/15/2021   Admit Date: 11/27/2021  CC: Follow up for ESRD        IMPRESSION & PLAN:   · ESRD -- home HD 5 days per week- Follows up with Dr Amy Ferraro at [facility] ( now [facility] HD for IV antibiotics.)  · Covid 19 infection  · Hemorrhagic shock  · Acute blood loss anemia from esophageal bleed from esophagitis  · hyperkalemia  · Left arm swelling- s/p angioplasty of left subclavian vein  · Gram positive sepsis from dental abcess  · s/p lead extraction at [facility]  · Anemia of ckd  · Hx of renal tx in past times 2.  · Hypertension  · CAD  · Failed RTX times 2         H/o DVT, s/p ivc filter/ h/o HIT      PLAN-  · NO HD TODAY  · Plan for hd on TTS schedule  · epogen for anemia  ·    Subjective: Interval History:   · rakel cath removed yesterday  · bp stable    Objective:   Vitals:    12/15/21 0500 12/15/21 0600 12/15/21 0700 12/15/21 0800   BP: 128/83 (!) 136/96 134/85 134/84   Pulse: 99 (!) 106 (!) 105 (!) 103   Resp: 30 (!) 31 24 (!) 34   Temp:    98.8 °F (37.1 °C)   TempSrc:       SpO2: 97% 100% 100% 100%   Weight:       Height:          12/14 0701 - 12/15 0700  In: 2346.7 [P.O.:200; I.V.:1836.7]  Out: 2500   Last 3 Recorded Weights in this Encounter    12/12/21 0100 12/13/21 0800 12/14/21 0400   Weight: 68.2 kg (150 lb 5.7 oz) 67.9 kg (149 lb 11.1 oz) 67.9 kg (149 lb 11.1 oz)      Physical exam:   GEN: NAD  NECK- Supple  RESP: no distress  NEURO:non focal    Seen from outside of the room    Chart reviewed. Pertinent Notes reviewed.      Data Review :  Recent Labs     12/15/21  0423 12/14/21 0513 12/13/21 2037    141 139   K 4.3 4.1 4.0    108 107   CO2 27 27 25   BUN 40* 49* 43*   CREA 3.50* 3.94* 3.36*   * 101* 103*   CA 8.2* 8.0* 8.2*   MG 2.6* 2.7* 2.4   PHOS 3.8 3.3 2.9     Recent Labs 12/15/21  0423 12/15/21  0021 12/14/21  1623 12/14/21  0513 12/14/21  0513 12/13/21  1130 12/13/21  0406   WBC 9.0  --   --   --  9.5  --  15.7*   HGB 8.1* 8.0* 8.2*   < > 6.9*   < > 7.4*   HCT 25.2*  --   --   --  22.2*  --  22.7*   PLT 69*  --   --   --  67*  --  66*    < > = values in this interval not displayed. No results for input(s): FE, TIBC, PSAT, FERR in the last 72 hours.    Medication list  reviewed  Current Facility-Administered Medications   Medication Dose Route Frequency    oxyCODONE IR (ROXICODONE) tablet 5 mg  5 mg Oral Q4H PRN    oxyCODONE IR (ROXICODONE) tablet 10 mg  10 mg Oral Q4H PRN    VANCOMYCIN INFORMATION NOTE   Other Rx Dosing/Monitoring    0.9% sodium chloride infusion 250 mL  250 mL IntraVENous PRN    TPN ADULT - CENTRAL AA 5% D15% W/ ELECTROLYTES AND CA   IntraVENous CONTINUOUS    diphenhydrAMINE (BENADRYL) capsule 25 mg  25 mg Oral QHS PRN    balsam peru-castor oiL (VENELEX) ointment   Topical Q12H    0.9% sodium chloride infusion 250 mL  250 mL IntraVENous PRN    epoetin emilie-epbx (RETACRIT) injection 20,000 Units  20,000 Units SubCUTAneous Q TUE, THU & SAT    glucagon (GLUCAGEN) injection 1 mg  1 mg IntraMUSCular PRN    insulin lispro (HUMALOG) injection   SubCUTAneous Q6H    dextrose (D50W) injection syrg 12.5-25 g  12.5-25 g IntraVENous PRN    simethicone (MYLICON) 43BL/7.7NQ oral drops 80 mg  1.2 mL Oral Multiple    [Held by provider] calcium acetate(phosphat bind) (PHOSLO) capsule 1,334 mg  2 Capsule Per NG tube TID WITH MEALS    metoclopramide HCl (REGLAN) injection 5 mg  5 mg IntraVENous Q8H    albuterol-ipratropium (DUO-NEB) 2.5 MG-0.5 MG/3 ML  3 mL Nebulization Q4H PRN    pantoprazole (PROTONIX) 40 mg in 0.9% sodium chloride 10 mL injection  40 mg IntraVENous Q12H    albumin human 25% (BUMINATE) solution 25 g  25 g IntraVENous DIALYSIS PRN    alcohol 62% (NOZIN) nasal  1 Ampule  1 Ampule Topical Q12H    chlorhexidine (PERIDEX) 0.12 % mouthwash 15 mL  15 mL Oral Q12H    acetaminophen (TYLENOL) tablet 650 mg  650 mg Oral Q6H PRN    [Held by provider] L.acidophilus-paracasei-S.thermophil-bifidobacter (RISAQUAD) 8 billion cell capsule  1 Capsule Oral DAILY    sodium chloride (NS) flush 5-40 mL  5-40 mL IntraVENous Q8H    sodium chloride (NS) flush 5-40 mL  5-40 mL IntraVENous PRN    polyethylene glycol (MIRALAX) packet 17 g  17 g Oral DAILY PRN    ondansetron (ZOFRAN) injection 4 mg  4 mg IntraVENous Q6H PRN          Matt Allred MD              [facility]  [facility] / [facility], [address] [facility]  [address] [facility] [address], [address]  Phone - [phone]               Fax - [phone]

## 2021-12-15 NOTE — PROGRESS NOTES
Received message from patient's nurse stating:    Pt was brought to the ICU on 12/6 for hematemesis and bleeding. EGD found clot in stomach and pt was placed on Reglan. He has been refusing the Reglan for previous shifts and is now complaining of severe abdominal and back pain. Continuing to ask for pain medication, PRN morphine given and encouraged to take Reglan but is still refusing. He was previously taking Roxicodone 5 mg tabs for pain. Discussion / orders:    Patient level of care has been downgraded to stepdown as of yesterday.  Ordered Oxycodone 5 mg by mouth every 4 hours as needed for moderate pain and 10 mg by mouth every 4 hours as needed for severe pain           Please note that this note was dictated using Dragon computer voice recognition software. Quite often unanticipated grammatical, syntax, homophones, and other interpretive errors are inadvertently transcribed by the computer software. Please disregard these errors. Please excuse any errors that have escaped final proofreading.      Signed by:  Jose Hall DNP, ACNP-BC

## 2021-12-15 NOTE — PROGRESS NOTES
1900- Bedside shift report received from Sam Goltz, [address] [place] [address]- Shift assessment complete, pt resting comfortably in bed. A&O X4 and following commands appropriately. Currently sinus tach with stable BP. No pain at this time, pt repositioning self with call bell in reach. See flowsheets for more details. 2030- Nasal cannula removed. Pt O2 sats 97%. Will leave off and continue to closely monitor. 0000- Reassessment complete, pt resting comfortably in bed. No changes noted from previous assessment, see flowsheets for more details. 0400- Reassessment complete, pt resting comfortably in bed. No changes noted from previous assessment, see flowsheets for more details. 1028- Pt complaining of severe abdominal pain and back pain. Perfect serve message sent to A Purveyor, NP regarding pt's pain, refusal of Reglan, and at home use of Roxicodone. 0- New orders noted for PRN Roxicodone.      0700- Bedside shift report given to Luz Rodrigez

## 2021-12-15 NOTE — PROGRESS NOTES
Care Management:     MARINO :     RUR:27%  Disposition:? Home with [facility]    ( they have accepted)  versus acute rehab. CM spoke with patient today and he would like [address] for rehab . FOC placed on chart and referral sent via Liz Shin is ESRD and usually does dialysis at home. He would need to do at  dialysis at [facility]   ( [phone])  for  ABX if he were to decide to go home with [place] and not go to rehab. . Once IV therapy complete he may be able to resume home HD 5 days a week. Follow up appointments:PCP, oral surgeon-Dr Fiorella Paige, [phone]- scheduled for Dec 23rd @10:30am  DME needed:RW from [place] delivered to patient in his room. Transportation at [address] [place] [address] or means to access home:    family   IM Medicare Letter: Will provide upon d/c   Is patient a BCPI-A Bundle:  N/A            Caregiver Contact:sisterKinsey [phone]  Discharge Caregiver contacted prior to discharge?     Has transfer orders to step down. Covid and on 02 2l nc. ESRD and now on HD TTS schedule.  .       ESRD HD is   is usually maintained on at home 5 days a week  and followed by Dr Magnus Barrow.       PTA he lived with his sister Jonah Don .       CM to continue to monitor for d/c needs.     Danial Winn RN ACM [id number]

## 2021-12-15 NOTE — PROGRESS NOTES
Problem: Mobility Impaired (Adult and Pediatric)  Goal: *Acute Goals and Plan of Care (Insert Text)  Description: FUNCTIONAL STATUS PRIOR TO ADMISSION: Patient was independent and active without use of DME.    HOME SUPPORT PRIOR TO ADMISSION: The patient lived with sister. Physical Therapy Goals  Initiated 11/30/2021  1. Patient will move from supine to sit and sit to supine  in bed with independence within 7 day(s). 2.  Patient will transfer from bed to chair and chair to bed with modified independence using the least restrictive device within 7 day(s). 3.  Patient will perform sit to stand with modified independence within 7 day(s). 4.  Patient will ambulate with modified independence for 100 feet with the least restrictive device within 7 day(s). Reviewed 12/15- goals remain appropriate. Outcome: Progressing Towards Goal     PHYSICAL THERAPY REEVALUATION  Patient: Shilpi Ryan (6 y.o. male)  Date: 12/15/2021  Primary Diagnosis: Acute COVID-19 [U07.1]  Procedure(s) (LRB):  ESOPHAGOGASTRODUODENOSCOPY (EGD) (N/A) 7 Days Post-Op   Precautions:   Contact,Fall,Skin (COVID)      ASSESSMENT  Based on the objective data described below, the patient presents with generalized weakness, decreased activity tolerance, impaired balance, altered gait and overall decreased functional mobility. Pt was received in supine on room air and cleared by nursing to mobilize. VSS during session with oxygen ranging % with all activity. He was able to come to the edge of the bed without assistance and able to maintain sitting balance with dynamic movements. He required rest breaks throughout the session to catch his breath. Able to stand and take a few steps to the chair. After rest he performed 5x sit<>stand without physical assistance and fairly good standing balance with support. He would benefit from RW next session.      Current Level of Function Impacting Discharge (mobility/balance): CGA    Functional Outcome Measure: The patient scored 65/100 on the barthel outcome measure which is indicative of 35% impaired function. Other factors to consider for discharge:      Patient will benefit from skilled therapy intervention to address the above noted impairments. PLAN :  Recommendations and Planned Interventions: bed mobility training, transfer training, gait training, therapeutic exercises, patient and family training/education, and therapeutic activities      Frequency/Duration: Patient will be followed by physical therapy:  3 times a week to address goals. Recommendation for discharge: (in order for the patient to meet his/her long term goals)  Therapy 3 hours per day 5-7 days per week    This discharge recommendation:  Has not yet been discussed the attending provider and/or case management    Equipment recommendations for successful discharge (if) home: TBD         SUBJECTIVE:   Patient stated i want to get up.     OBJECTIVE DATA SUMMARY:   HISTORY:    Past Medical History:   Diagnosis Date    Abdominal hematoma     AICD (automatic cardioverter/defibrillator) present     CAD (coronary artery disease)     anterior MI s/p 2 stents  2007    Chronic abdominal pain     Chronic kidney disease     ESRD; Dialysis dependent.  Home [facility] does labs- [facility] tpke    DVT (deep venous thrombosis) ([facility] [address] 2001    DVT of popliteal vein ([address] 11/2011    not anticoagulated due to bleeding, IVC filter    Endocarditis     Gallstone pancreatitis     Gastrointestinal disorder     acid reflux    Gastrointestinal disorder     peptic ulcer    Hemodialysis patient ([address]     High cholesterol     Hypertension     Kidney transplant     b/l kidneys 1995, 2001    Long term current use of anticoagulant therapy     Nephrotic syndrome     Other ill-defined conditions(799.89)     kidny transplant x2,  on dialysis    Other ill-defined conditions(799.89)     home dialysis    Other ill-defined conditions(799.89)     hx recurrent left leg DVT    Peritonitis (HCC)     Seizures ([facility] [address] 2015    most recent- only had three in lifetime    Small bowel obstruction (HCC)     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    V-tach [facility])      Past Surgical History:   Procedure Laterality Date    HX APPENDECTOMY      HX CHOLECYSTECTOMY      HX OTHER SURGICAL  11/2011    exploratory laparotomy    HX OTHER SURGICAL      fem-pop    HX OTHER SURGICAL  02/2013    right upper extremity fistula    HX PACEMAKER Left 6/2009    AICD-st latonya-not connected    HX PACEMAKER Left     AICD-left side-BS-working    HX SMALL BOWEL RESECTION      HX TRANSPLANT  2001     Kidney    HX TRANSPLANT  1992    kidney    HX VASCULAR ACCESS Right     femoral access for HD- does 5 day dialysis @ home    IR INSERT NON TUNL CVC OVER 5 YRS  12/7/2021    IR INSERT NON TUNL CVC OVER 5 YRS  12/10/2021    IR REMOVE TUNL CVAD W/O PORT / PUMP  10/29/2020    IR REPLACE CVC TUNNELED W/O PORT  9/10/2020    AL CARDIAC SURG PROCEDURE UNLIST  2007    2 stents    AL EDG US EXAM SURGICAL ALTER STOM DUODENUM/JEJUNUM  7/15/2011         AL ESOPHAGOGASTRODUODENOSCOPY TRANSORAL DIAGNOSTIC  5/24/2012         UPPER GI ENDOSCOPY,CTRL BLEED  12/6/2021         UPPER GI ENDOSCOPY,DIAGNOSIS  12/8/2021         VASCULAR SURGERY PROCEDURE UNLIST  11/14/2017    Insertion AV graft right upper arm (bovine); ligation of AV fistula right arm     Hospital course since last seen and reason for reevaluation: pt had GI bleed and was intubated, extubated 12/11    Personal factors and/or comorbidities impacting plan of care:     Home Situation  Home Environment: Private residence  # Steps to Enter: 4  Rails to Enter: Yes  Hand Rails : Right  One/Two Story Residence: Two story  # of Interior Steps: 21  Interior Rails: Both  Living Alone: No  Support Systems: Other Family Member(s) (Pt lives with his sister in a duplex house has 20 steps to enter )  Patient Expects to be Discharged toVF Cor[de-identified]ration  Current DME Used/Available at Home: None  Tub or Shower Type: Tub/Shower combination    EXAMINATION/PRESENTATION/DECISION MAKING:   Critical Behavior:  Neurologic State: Alert  Orientation Level: Oriented X4  Cognition: Follows commands  Safety/Judgement: Awareness of environment,Insight into deficits,Decreased awareness of need for safety  Hearing: Auditory  Auditory Impairment: None  Skin:  intact  Edema: none  Functional Mobility:  Bed Mobility:  Rolling: Supervision  Supine to Sit: Supervision     Scooting: Supervision  Transfers:  Sit to Stand: Contact guard assistance  Stand to Sit: Contact guard assistance                       Balance:   Sitting: Intact  Standing: Impaired  Standing - Static: Fair;Constant support  Standing - Dynamic : Fair;Constant support  Ambulation/Gait Training:  Distance (ft): 2 Feet (ft)  Assistive Device:  (HHA)  Ambulation - Level of Assistance: Minimal assistance        Gait Abnormalities: Decreased step clearance;Shuffling gait        Base of Support: Narrowed     Speed/Bernarda: Pace decreased (<100 feet/min); Shuffled  Step Length: Left shortened;Right shortened                  Functional Measure:  Barthel Index:    Bathin  Bladder: 10  Bowels: 10  Groomin  Dressing: 10  Feeding: 10  Mobility: 0  Stairs: 0  Toilet Use: 10  Transfer (Bed to Chair and Back): 10  Total: 65/100       The Barthel ADL Index: Guidelines  1. The index should be used as a record of what a patient does, not as a record of what a patient could do. 2. The main aim is to establish degree of independence from any help, physical or verbal, however minor and for whatever reason. 3. The need for supervision renders the patient not independent. 4. A patient's performance should be established using the best available evidence. Asking the patient, friends/relatives and nurses are the usual sources, but direct observation and common sense are also important. However direct testing is not needed.   5. Usually the patient's performance over the preceding 24-48 hours is important, but occasionally longer periods will be relevant. 6. Middle categories imply that the patient supplies over 50 per cent of the effort. 7. Use of aids to be independent is allowed. Score Interpretation (from [address] [facility])    Independent   60-79 Minimally independent   40-59 Partially dependent   20-39 Very dependent   <20 Totally dependent     -Edyta Harden., BarthelJOANNA. (1965). Functional evaluation: the Barthel Index. [address] ([address] (1997). The Barthel activities of daily living index: self-reporting versus actual performance in the old (> or = 75 years). Journal of [address] [place] [address] 45(7), 14 [facility], LEO, Amarilis Keller., [facility]. (1999). Measuring the change in disability after inpatient rehabilitation; comparison of the responsiveness of the Barthel Index and Functional [place] Measure. Journal of Neurology, Neurosurgery, and Psychiatry, 66(4), 926-912. KEIRA Mace.A, GAMAL Payton, & Clint Haque MDIVYA. (2004) Assessment of post-stroke quality of life in cost-effectiveness studies: The usefulness of the Barthel Index and the EuroQoL-5D. Quality of Life Research, 15, 079-90           Activity Tolerance:   Fair    After treatment patient left in no apparent distress:   Sitting in chair and Call bell within reach    COMMUNICATION/EDUCATION:   The patients plan of care was discussed with: Occupational therapist and Registered nurse. Fall prevention education was provided and the patient/caregiver indicated understanding. and Patient/family agree to work toward stated goals and plan of care.     Thank you for this referral.  Yi Hathaway, PT, DPT   Time Calculation: 31 mins

## 2021-12-15 NOTE — PROGRESS NOTES
End of Shift Note    Bedside shift change report given to Severiano Luna RN (oncoming nurse) by Toshia Chery RN (offgoing nurse). Report included the following information SBAR, Kardex, Intake/Output, Cardiac Rhythm SR, Alarm Parameters  and Quality Measures    Shift worked:  7a-7p     Shift summary and any significant changes:     none     Concerns for physician to address:  Femoral line replacement     Zone phone for oncoming shift:   N/A       Activity:  Activity Level: Bed Rest  Number times ambulated in hallways past shift: 0  Number of times OOB to chair past shift: Up in chair/day shift    Cardiac:   Cardiac Monitoring: Yes      Cardiac Rhythm: Sinus Rhythm    Access:   Current line(s): central line     Genitourinary:   Urinary status: anuric    Respiratory:   O2 Device: None (Room air)  Chronic home O2 use?: NO  Incentive spirometer at bedside: NO     GI:  Last Bowel Movement Date: 12/15/21  Current diet:  ADULT ORAL NUTRITION SUPPLEMENT Breakfast, Lunch, Dinner; Clear Liquid  TPN ADULT-CENTRAL AA 5% D15%-MVI W/ VIT K [facility]  ADULT DIET Full Liquid  DIET ONE TIME MESSAGE  Passing flatus: NO  Tolerating current diet: YES       Pain Management:   Patient states pain is manageable on current regimen: YES    Skin:  Yong Score: 16  Interventions: speciality bed, float heels, PT/OT consult and nutritional support     Patient Safety:  Fall Score:  Total Score: 3  Interventions: bed/chair alarm, gripper socks and pt to call before getting OOB  High Fall Risk: Yes    Length of Stay:  Expected LOS: 4d 21h  Actual LOS: MANUELITO Ponce

## 2021-12-16 LAB
ANION GAP SERPL CALC-SCNC: 11 MMOL/L (ref 5–15)
ANION GAP SERPL CALC-SCNC: 11 MMOL/L (ref 5–15)
BASOPHILS # BLD: 0.1 K/UL (ref 0–0.1)
BASOPHILS NFR BLD: 1 % (ref 0–1)
BUN SERPL-MCNC: 70 MG/DL (ref 6–20)
BUN SERPL-MCNC: 77 MG/DL (ref 6–20)
BUN/CREAT SERPL: 14 (ref 12–20)
BUN/CREAT SERPL: 14 (ref 12–20)
CALCIUM SERPL-MCNC: 8.4 MG/DL (ref 8.5–10.1)
CALCIUM SERPL-MCNC: 8.4 MG/DL (ref 8.5–10.1)
CHLORIDE SERPL-SCNC: 104 MMOL/L (ref 97–108)
CHLORIDE SERPL-SCNC: 105 MMOL/L (ref 97–108)
CO2 SERPL-SCNC: 21 MMOL/L (ref 21–32)
CO2 SERPL-SCNC: 22 MMOL/L (ref 21–32)
CREAT SERPL-MCNC: 5.01 MG/DL (ref 0.7–1.3)
CREAT SERPL-MCNC: 5.4 MG/DL (ref 0.7–1.3)
DIFFERENTIAL METHOD BLD: ABNORMAL
EOSINOPHIL # BLD: 0.3 K/UL (ref 0–0.4)
EOSINOPHIL NFR BLD: 3 % (ref 0–7)
ERYTHROCYTE [DISTWIDTH] IN BLOOD BY AUTOMATED COUNT: 19.5 % (ref 11.5–14.5)
GLUCOSE BLD STRIP.AUTO-MCNC: 166 MG/DL (ref 65–117)
GLUCOSE BLD STRIP.AUTO-MCNC: 69 MG/DL (ref 65–117)
GLUCOSE BLD STRIP.AUTO-MCNC: 75 MG/DL (ref 65–117)
GLUCOSE BLD STRIP.AUTO-MCNC: 88 MG/DL (ref 65–117)
GLUCOSE SERPL-MCNC: 131 MG/DL (ref 65–100)
GLUCOSE SERPL-MCNC: 94 MG/DL (ref 65–100)
HCT VFR BLD AUTO: 27.8 % (ref 36.6–50.3)
HGB BLD-MCNC: 8.5 G/DL (ref 12.1–17)
HGB BLD-MCNC: 8.6 G/DL (ref 12.1–17)
IMM GRANULOCYTES # BLD AUTO: 0 K/UL (ref 0–0.04)
IMM GRANULOCYTES NFR BLD AUTO: 0 % (ref 0–0.5)
LYMPHOCYTES # BLD: 1 K/UL (ref 0.8–3.5)
LYMPHOCYTES NFR BLD: 11 % (ref 12–49)
MCH RBC QN AUTO: 29.3 PG (ref 26–34)
MCHC RBC AUTO-ENTMCNC: 30.9 G/DL (ref 30–36.5)
MCV RBC AUTO: 94.6 FL (ref 80–99)
MONOCYTES # BLD: 1.2 K/UL (ref 0–1)
MONOCYTES NFR BLD: 14 % (ref 5–13)
NEUTS SEG # BLD: 6.1 K/UL (ref 1.8–8)
NEUTS SEG NFR BLD: 71 % (ref 32–75)
NRBC # BLD: 0.05 K/UL (ref 0–0.01)
NRBC BLD-RTO: 0.6 PER 100 WBC
PLATELET # BLD AUTO: 84 K/UL (ref 150–400)
PMV BLD AUTO: 12.7 FL (ref 8.9–12.9)
POTASSIUM SERPL-SCNC: 4 MMOL/L (ref 3.5–5.1)
POTASSIUM SERPL-SCNC: 4.2 MMOL/L (ref 3.5–5.1)
PROCALCITONIN SERPL-MCNC: 5.85 NG/ML
RBC # BLD AUTO: 2.94 M/UL (ref 4.1–5.7)
RBC MORPH BLD: ABNORMAL
RBC MORPH BLD: ABNORMAL
SERVICE CMNT-IMP: ABNORMAL
SERVICE CMNT-IMP: NORMAL
SODIUM SERPL-SCNC: 136 MMOL/L (ref 136–145)
SODIUM SERPL-SCNC: 138 MMOL/L (ref 136–145)
WBC # BLD AUTO: 8.7 K/UL (ref 4.1–11.1)

## 2021-12-16 PROCEDURE — 74011250636 HC RX REV CODE- 250/636: Performed by: INTERNAL MEDICINE

## 2021-12-16 PROCEDURE — 74011000250 HC RX REV CODE- 250: Performed by: GENERAL ACUTE CARE HOSPITAL

## 2021-12-16 PROCEDURE — 85018 HEMOGLOBIN: CPT

## 2021-12-16 PROCEDURE — 36415 COLL VENOUS BLD VENIPUNCTURE: CPT

## 2021-12-16 PROCEDURE — 80048 BASIC METABOLIC PNL TOTAL CA: CPT

## 2021-12-16 PROCEDURE — 85025 COMPLETE CBC W/AUTO DIFF WBC: CPT

## 2021-12-16 PROCEDURE — C9113 INJ PANTOPRAZOLE SODIUM, VIA: HCPCS | Performed by: INTERNAL MEDICINE

## 2021-12-16 PROCEDURE — 84145 PROCALCITONIN (PCT): CPT

## 2021-12-16 PROCEDURE — 74011000250 HC RX REV CODE- 250: Performed by: INTERNAL MEDICINE

## 2021-12-16 PROCEDURE — 90935 HEMODIALYSIS ONE EVALUATION: CPT

## 2021-12-16 PROCEDURE — 74011250637 HC RX REV CODE- 250/637: Performed by: EMERGENCY MEDICINE

## 2021-12-16 PROCEDURE — 74011250636 HC RX REV CODE- 250/636: Performed by: GENERAL ACUTE CARE HOSPITAL

## 2021-12-16 PROCEDURE — 65660000000 HC RM CCU STEPDOWN

## 2021-12-16 PROCEDURE — 74011250637 HC RX REV CODE- 250/637: Performed by: INTERNAL MEDICINE

## 2021-12-16 PROCEDURE — 82962 GLUCOSE BLOOD TEST: CPT

## 2021-12-16 PROCEDURE — 74011250637 HC RX REV CODE- 250/637: Performed by: NURSE PRACTITIONER

## 2021-12-16 PROCEDURE — 74011636637 HC RX REV CODE- 636/637: Performed by: INTERNAL MEDICINE

## 2021-12-16 RX ORDER — INSULIN LISPRO 100 [IU]/ML
INJECTION, SOLUTION INTRAVENOUS; SUBCUTANEOUS
Status: DISCONTINUED | OUTPATIENT
Start: 2021-12-16 | End: 2021-12-24 | Stop reason: HOSPADM

## 2021-12-16 RX ORDER — METOCLOPRAMIDE HYDROCHLORIDE 5 MG/ML
5 INJECTION INTRAMUSCULAR; INTRAVENOUS EVERY 12 HOURS
Status: DISCONTINUED | OUTPATIENT
Start: 2021-12-16 | End: 2021-12-17

## 2021-12-16 RX ADMIN — Medication 10 ML: at 05:30

## 2021-12-16 RX ADMIN — Medication 1 AMPULE: at 21:42

## 2021-12-16 RX ADMIN — CHLORHEXIDINE GLUCONATE 15 ML: 1.2 RINSE ORAL at 08:16

## 2021-12-16 RX ADMIN — Medication 10 ML: at 21:53

## 2021-12-16 RX ADMIN — Medication: at 08:16

## 2021-12-16 RX ADMIN — OXYCODONE 10 MG: 5 TABLET ORAL at 16:26

## 2021-12-16 RX ADMIN — Medication: at 21:42

## 2021-12-16 RX ADMIN — OXYCODONE 10 MG: 5 TABLET ORAL at 21:43

## 2021-12-16 RX ADMIN — ACETAMINOPHEN 650 MG: 325 TABLET ORAL at 21:43

## 2021-12-16 RX ADMIN — MAGNESIUM SULFATE HEPTAHYDRATE: 500 INJECTION, SOLUTION INTRAMUSCULAR; INTRAVENOUS at 18:03

## 2021-12-16 RX ADMIN — SODIUM CHLORIDE 40 MG: 9 INJECTION INTRAMUSCULAR; INTRAVENOUS; SUBCUTANEOUS at 08:15

## 2021-12-16 RX ADMIN — SODIUM CHLORIDE 40 MG: 9 INJECTION INTRAMUSCULAR; INTRAVENOUS; SUBCUTANEOUS at 21:42

## 2021-12-16 RX ADMIN — OXYCODONE 10 MG: 5 TABLET ORAL at 10:32

## 2021-12-16 RX ADMIN — EPOETIN ALFA-EPBX 20000 UNITS: 20000 INJECTION, SOLUTION INTRAVENOUS; SUBCUTANEOUS at 23:56

## 2021-12-16 RX ADMIN — METOCLOPRAMIDE HYDROCHLORIDE 5 MG: 5 INJECTION INTRAMUSCULAR; INTRAVENOUS at 06:10

## 2021-12-16 RX ADMIN — OXYCODONE 10 MG: 5 TABLET ORAL at 03:23

## 2021-12-16 RX ADMIN — CHLORHEXIDINE GLUCONATE 15 ML: 1.2 RINSE ORAL at 21:53

## 2021-12-16 RX ADMIN — METOCLOPRAMIDE 5 MG: 5 INJECTION, SOLUTION INTRAMUSCULAR; INTRAVENOUS at 21:43

## 2021-12-16 RX ADMIN — INSULIN LISPRO 2 UNITS: 100 INJECTION, SOLUTION INTRAVENOUS; SUBCUTANEOUS at 05:29

## 2021-12-16 RX ADMIN — Medication 1 AMPULE: at 08:15

## 2021-12-16 RX ADMIN — Medication 10 ML: at 08:15

## 2021-12-16 RX ADMIN — Medication 10 ML: at 14:00

## 2021-12-16 NOTE — PROGRESS NOTES
TRANSFER - OUT REPORT:    Verbal report given to MANUELITO Costa(name) on Stef Carty  being transferred to [facility](unit) for routine progression of care       Report consisted of patients Situation, Background, Assessment and   Recommendations(SBAR). Information from the following report(s) SBAR, Kardex, Recent Results, Cardiac Rhythm SR-ST and Quality Measures was reviewed with the receiving nurse. Lines:   Quad Lumen quad lumen 12/06/21 Proximal; Right Femoral (Active)   Central Line Being Utilized Yes 12/16/21 0400   Criteria for Appropriate Use Total parenteral nutrition 12/16/21 0400   Site Assessment Clean, dry, & intact 12/16/21 0400   Infiltration Assessment 0 12/16/21 0400   Affected Extremity/Extremities Color distal to insertion site pink (or appropriate for race); Pulses palpable;Range of motion performed 12/16/21 0400   Date of Last Dressing Change 12/09/21 12/16/21 0400   Dressing Status Clean, dry, & intact 12/16/21 0400   Dressing Type Disk with Chlorhexadine gluconate (CHG); Transparent 12/16/21 0400   Action Taken Open ports on tubing capped 12/16/21 0400   Proximal Hub Color/Line Status White; Infusing 12/16/21 0400   Positive Blood Return (Medial Site) Yes 12/16/21 0400   Medial 1 Hub Color/Line Status Gray;Flushed;Capped 12/16/21 0400   Positive Blood Return (Lateral Site) Yes 12/16/21 0400   Medial 2 Hub Color/Line Status Blue;Flushed;Capped 12/16/21 0400   Positive Blood Return (Site #3) Yes 12/16/21 0400   Distal Hub Color/Line Status Brown;Capped;Flushed 12/16/21 0400   Positive Blood Return (Site #4) Yes 12/16/21 0400   External Catheter Length (cm) 0 centimeters 12/14/21 0400   Alcohol Cap Used Yes 12/16/21 0400        Opportunity for questions and clarification was provided.       Patient transported with:   Monitor  Patient-specific medications from Pharmacy  Registered Nurse  Tech

## 2021-12-16 NOTE — PROGRESS NOTES
MARINO :     RUR:27%  Disposition:? Home with [facility]    ( they have accepted)  versus acute rehab. CM spoke with patient today and he would like [address] for rehab . FOC placed on chart and referral sent via [address] [place] is ESRD and usually does dialysis at home. He would need to do at  dialysis at [facility]   ( [phone])  for  ABX if he were to decide to go home with [facility] and not go to rehab. . Once IV therapy complete he may be able to resume home HD 5 days a week.      Follow up appointments:PCP, oral surgeon-Dr Cheng Kerr, [phone]- scheduled for Dec 23rd @10:30am  DME needed:RW from [place] delivered to patient in his room.   Transportation at [address] [facility] or means to access home:    family   IM Medicare Letter: Will provide upon d/c   Is patient a BCPI-A Bundle:  N/A            Caregiver Contact:sisterVicki Pack [phone]  Discharge Caregiver contacted prior to discharge? Initial note:  Chart reviewed. CM attempted to call [facility] to discuss pt d/c date and dialysis chair if needed. CM will f/u in the morning. CM will continue to monitor for d/c needs.     Merle Valenzuela, MSN  Care Manager  [phone]

## 2021-12-16 NOTE — PROGRESS NOTES
Hospitalist Progress Note    NAME: Stef Carty   :  1975   MRN:  [id number]       Assessment / Plan:    Hemorrhagic shock  Upper GI bleed  Ileus   Protein calorie malnutrition   transaminitis   EGD: distal esophageal ulcer with large clots in stomach  Repeat EGD: distal esophageal ulcer with blood in stomach  Hb stable, s/p transfusion of: 11 units PRBC, 3 units Plasma, 2 Cryoprecipitate, 3 units Plts  GI hold off doing a 3rd EGD  Gen Cheko not need for surgical intervention now  PPI  Reglan   Cont TPN  Epogen     B/l pleural effusion  Covid Pna  B/l Pna  Sepsis   Recent Streptococcus intermedius bacteremia (2021)  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 Code status: Full  Prophylaxis: SCD's  Recommended Disposition: SAH/Rehab  Anticipated Discharge Date:  >48 hours        Subjective:     Discussed with RN events overnight. No complaints  Afebrile  Denies CP and SOB  Had brown BMs today     Review of Systems:  Symptom Y/N Comments  Symptom Y/N Comments   Fever/Chills    Chest Pain     Poor Appetite    Edema     Cough    Abdominal Pain     Sputum    Joint Pain     SOB/ZAMUIDO    Pruritis/Rash     Nausea/vomit    Tolerating PT/OT     Diarrhea    Tolerating Diet     Constipation    Other       PO intake: No data found.     Wt Readings from Last 10 Encounters: 12/14/21 67.9 kg (149 lb 11.1 oz)   11/17/21 66.1 kg (145 lb 12.8 oz)   04/07/21 66.8 kg (147 lb 4.3 oz)   03/27/21 67.4 kg (148 lb 9.4 oz)   11/08/20 68.6 kg (151 lb 4.8 oz)   11/07/20 67.1 kg (148 lb)   10/20/20 67.5 kg (148 lb 13 oz)   09/13/20 70 kg (154 lb 5.2 oz)   09/10/20 70 kg (154 lb 5.2 oz)   09/08/20 66.7 kg (147 lb 0.8 oz)       Objective:     VITALS:   Last 24hrs VS reviewed since prior progress note. Most recent are:  Patient Vitals for the past 24 hrs:   Temp Pulse Resp BP SpO2   12/16/21 0200  97 24  98 %   12/16/21 0100  (!) 104 30  98 %   12/16/21 0000 98.4 °F (36.9 °C) 98 27  99 %   12/15/21 2300  98 29  100 %   12/15/21 2200  99 19  100 %   12/15/21 2100  (!) 106 26  100 %   12/15/21 2000 98.7 °F (37.1 °C) (!) 107 (!) 31  98 %   12/15/21 1630 98.3 °F (36.8 °C) (!) 101 26 122/81 100 %   12/15/21 1200 98.7 °F (37.1 °C) (!) 106 27 137/88 100 %   12/15/21 0800 98.8 °F (37.1 °C) (!) 103 (!) 34 134/84 100 %   12/15/21 0700  (!) 105 24 134/85 100 %   12/15/21 0600  (!) 106 (!) 31 (!) 136/96 100 %   12/15/21 0500  99 30 128/83 97 %   12/15/21 0400 97.9 °F (36.6 °C) (!) 107 (!) 35 (!) 146/85 99 %       Intake/Output Summary (Last 24 hours) at 12/16/2021 0323  Last data filed at 12/16/2021 0200  Gross per 24 hour   Intake 2018.75 ml   Output    Net 2018.75 ml        I had a face to face encounter, and independently examined this patient on 12/16/2021, as outlined below:    PHYSICAL EXAM:  General:    No distress     HEENT: Atraumatic, anicteric sclerae, pink conjunctivae, MMM  Neck:  Supple, symmetrical  Lungs:   CTA. No Wheezing/Rhonchi. No rales. No tenderness. No Accessory muscle use. Heart:   Regular rhythm. No murmur. No JVD. Fem line   GI/:   Soft. NT. ND. BS normal  Extremities: + edema. No cyanosis. No clubbing. Skin:     Not pale. Not Jaundiced. No rashes   Psych:  Good insight. Not depressed. Not anxious or agitated. Neurologic: Alert and oriented X 4. EOMs intact.  No facial asymmetry. No slurred speech. Symmetrical strength, Sensation grossly intact. Labs     I reviewed today's most current labs and imaging studies. Pertinent labs include:  Recent Labs     12/15/21  1646 12/15/21  0423 12/15/21  0021 12/14/21  1623 12/14/21  0513 12/13/21  1130 12/13/21  0406   WBC  --  9.0  --   --  9.5  --  15.7*   HGB 8.6* 8.1* 8.0*   < > 6.9*   < > 7.4*   HCT  --  25.2*  --   --  22.2*  --  22.7*   PLT  --  69*  --   --  67*  --  66*    < > = values in this interval not displayed. Recent Labs     12/15/21  0423 12/14/21  0513 12/13/21  2037 12/13/21  0406 12/13/21  0406    141 139   < > 138   K 4.3 4.1 4.0   < > 3.9    108 107   < > 106   CO2 27 27 25   < > 27   * 101* 103*   < > 99   BUN 40* 49* 43*   < > 39*   CREA 3.50* 3.94* 3.36*   < > 2.97*   CA 8.2* 8.0* 8.2*   < > 8.2*   MG 2.6* 2.7* 2.4   < > 2.6*   PHOS 3.8 3.3 2.9   < > 2.5*   ALB 1.8*  --   --   --  1.8*   TBILI 0.8  --   --   --  1.1*   ALT 54  --   --   --  76   INR 1.2*  --   --   --   --     < > = values in this interval not displayed. XR ABD (KUB)    Result Date: 12/10/2021  Femoral catheter as above. XR ABD (KUB)    Result Date: 12/8/2021  Gaseous distention of the large and small bowel. No evidence of free intraperitoneal air on this single view. CT ABD PELV WO CONT    Result Date: 12/8/2021  1. Generalized gaseous distention of large and small bowel. Findings are consistent with a generalized ileus. 2. No evidence of bowel perforation. 3. Continued gastric distention with hemorrhagic material, similar to prior study. 4. NG tube in the stomach. 5. Covid 19 pneumonia has worsened in the visualized lung bases. 6. Continued small to moderate bilateral pleural effusions. 7. Severe diffuse calcific atherosclerosis. 8. Evidence of end-stage renal disease. CT ABD PELV W WO CONT    Result Date: 12/7/2021  1.  There is a large amount of hemorrhage within a distended stomach, extending into the duodenum. An active bleed is not seen on this examination. However, delayed images were not performed, somewhat limiting the study. 2. Bilateral pleural effusions with bilateral pneumonia. XR CHEST PORT    Result Date: 12/9/2021  Unchanged patchy bilateral airspace disease. XR CHEST PORT    Result Date: 12/8/2021  NG tube is coiled in the patient's throat, and terminates in the mid/distal thoracic esophagus. Recommend repositioning. XR CHEST PORT    Result Date: 12/7/2021  No change. XR ABD PORT  1 V    Result Date: 12/7/2021  1. Rotated film. Femoral line overlies the right pelvis     XR ABD PORT  1 V    Result Date: 12/7/2021  Distended stomach, NG tube in place. IR INSERT NON TUNL CVC OVER 5 YRS    Result Date: 12/10/2021  Successful exchange of left femoral Jorge A catheter. The catheter is ready for immediate use. IR INSERT NON TUNL CVC OVER 5 YRS    Result Date: 12/7/2021  1. Successful placement of left common femoral temporary dialysis catheter. The catheter is ready for immediate use. No results found. 11/05/21    ECHO EVER W OR WO CONTRAST 11/11/2021 11/11/2021    Interpretation Summary  · No signs for endocarditis by EVER. · LV: Estimated LVEF is 35 - 40%. Normal cavity size. Mildly increased wall thickness. Moderately reduced systolic function. · MV: Mitral valve leaflet calcification. Mild mitral annular calcification. 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 · TV: Moderate tricuspid valve regurgitation is present. · PA: Moderate pulmonary hypertension.     Signed by: Suzie Glynn MD on 11/11/2021 10:42 AM       Current Medications:     Current Facility-Administered Medications:     oxyCODONE IR (ROXICODONE) tablet 5 mg, 5 mg, Oral, Q4H PRN, Purveyor, Atoosa, ACNP    oxyCODONE IR (ROXICODONE) tablet 10 mg, 10 mg, Oral, Q4H PRN, Purveyor, Atoosa, ACNP, 10 mg at 12/15/21 2119    TPN ADULT-CENTRAL AA 5% D15%-MVI W/ VIT K RCH, , IntraVENous, CONTINUOUS, Harleen Chand MD, Last Rate: 75 mL/hr at 12/15/21 1957, New Bag at 12/15/21 1957    VANCOMYCIN INFORMATION NOTE, , Other, Rx Dosing/Monitoring, Harleen Chand MD    0.9% sodium chloride infusion 250 mL, 250 mL, IntraVENous, PRN, Harleen Chand MD    diphenhydrAMINE (BENADRYL) capsule 25 mg, 25 mg, Oral, QHS PRN, Harleen Chand MD, 25 mg at 12/15/21 2120    balsam peru-castor oiL (Bhumi Halsted) ointment, , Topical, Q12H, Harleen Chand MD, Given at 12/15/21 2119    0.9% sodium chloride infusion 250 mL, 250 mL, IntraVENous, PRN, Lawrence Milner NP    epoetin emilie-epbx (RETACRIT) injection 20,000 Units, 20,000 Units, SubCUTAneous, Q TUE, THU & SAT, Bird Leung MD, 20,000 Units at 12/14/21 2156    glucagon (GLUCAGEN) injection 1 mg, 1 mg, IntraMUSCular, PRN, Soto Rob MD    insulin lispro (HUMALOG) injection, , SubCUTAneous, Q6H, Soto Rob MD, 2 Units at 12/15/21 0028    dextrose (D50W) injection syrg 12.5-25 g, 12.5-25 g, IntraVENous, PRN, Soto Rob MD, 25 g at 12/09/21 1719    simethicone (MYLICON) 95DN/1.6GR oral drops 80 mg, 1.2 mL, Oral, Multiple, Terrance Gaitan MD    [Held by provider] calcium acetate(phosphat bind) (PHOSLO) capsule 1,334 mg, 2 Capsule, Per NG tube, TID WITH MEALS, Soto Rob MD    metoclopramide HCl (REGLAN) injection 5 mg, 5 mg, IntraVENous, Q8H, Soto Rob MD, 5 mg at 12/15/21 2119    albuterol-ipratropium (DUO-NEB) 2.5 MG-0.5 MG/3 ML, 3 mL, Nebulization, Q4H PRN, Soto Rob MD    pantoprazole (PROTONIX) 40 mg in 0.9% sodium chloride 10 mL injection, 40 mg, IntraVENous, Q12H, Art Sage MD, 40 mg at 12/15/21 2118    albumin human 25% (BUMINATE) solution 25 g, 25 g, IntraVENous, DIALYSIS PRN, Art Sage MD, 25 g at 12/07/21 1318    alcohol 62% (NOZIN) nasal  1 Ampule, 1 Ampule, Topical, Q12H, Soto Rob MD, 1 Ampule at 12/15/21 2119    chlorhexidine (PERIDEX) 0.12 % mouthwash 15 mL, 15 mL, Oral, Q12H, Anna Moe MD, 15 mL at 12/15/21 2118    acetaminophen (TYLENOL) tablet 650 mg, 650 mg, Oral, Q6H PRN, Gloria Mora MD, 650 mg at 12/15/21 0508    [Held by provider] L.acidophilus-paracasei-S.thermophil-bifidobacter (RISAQUAD) 8 billion cell capsule, 1 Capsule, Oral, DAILY, Palak CAMPOVERDE MD, 1 Capsule at 12/05/21 0909    sodium chloride (NS) flush 5-40 mL, 5-40 mL, IntraVENous, Q8H, Shweta Grimm MD, 10 mL at 12/15/21 2121    sodium chloride (NS) flush 5-40 mL, 5-40 mL, IntraVENous, PRN, Shweta Rojo MD    polyethylene glycol (MIRALAX) packet 17 g, 17 g, Oral, DAILY PRN, Grace Nguyen MD    [DISCONTINUED] ondansetron (ZOFRAN ODT) tablet 4 mg, 4 mg, Oral, Q8H PRN **OR** ondansetron (ZOFRAN) injection 4 mg, 4 mg, IntraVENous, Q6H PRN, Palak CAMPOVERDE MD, 4 mg at 12/06/21 7812     Procedures: see electronic medical records for all procedures/Xrays and details which were not copied into this note but were reviewed prior to creation of Plan. Reviewed most current lab test results and cultures  YES  Reviewed most current radiology test results   YES  Review and summation of old records today    NO  Reviewed patient's current orders and MAR    YES  PMH/ reviewed - no change compared to H&P  ________________________________________________________________________  Care Plan discussed with:    Comments   Patient x    Family      RN x    Care Manager     Consultant                        Multidiciplinary team rounds were held today with , nursing, pharmacist and clinical coordinator. Patient's plan of care was discussed; medications were reviewed and discharge planning was addressed.      ________________________________________________________________________  Total NON critical care TIME: 45    Minutes    Total CRITICAL CARE TIME Spent:   Minutes non procedure based      Comments   >50% of visit spent in counseling and coordination of care x     This includes time during multidisciplinary rounds if indicated above   ________________________________________________________________________  Micha Rodriguez MD

## 2021-12-16 NOTE — PROGRESS NOTES
Occupational Therapy    Pt with HD being DOVER at bedside. Will defer OT session on this date and continue to follow.        Enmanuel Duggan, OT

## 2021-12-16 NOTE — PROGRESS NOTES
Nephrology Progress Note  Blaise Leblanc     [email or web address]  Phone - [phone]   Patient: Yahaira Lewis    YOB: 1975        Date- 12/16/2021   Admit Date: 11/27/2021  CC: Follow up for esrd        IMPRESSION & PLAN:   · ESRD-- home HD 5 days per week- Follows up with Dr Indra Villa at [facility] ( now [facility] for IV antibiotics.)  · Covid 19 infection  · Hemorrhagic shock  · Acute blood loss anemia from esophageal bleed from esophagitis  · hyperkalemia  · Left arm swelling- s/p angioplasty of left subclavian vein  · Gram positive sepsis from dental abcess  · s/p lead extraction at [facility]  · Anemia of ckd  · Hx of renal tx in past times 2.  · Hypertension  · CAD  · Failed RTX times 2         H/o DVT, s/p ivc filter/ h/o HIT      PLAN-  · Dialysis today  · Plan for hd on TTS schedule  · epogen for anemia     Subjective: Interval History:   · Out of icu  · No sob  · bp stable    Objective:   Vitals:    12/16/21 1307 12/16/21 1315 12/16/21 1330 12/16/21 1345   BP: (!) 130/94 (!) 127/94 (!) 147/100 (!) 133/91   Pulse: 65 (!) 106 (!) 104 (!) 105   Resp:       Temp:       TempSrc:       SpO2:       Weight:       Height:          12/15 0701 - 12/16 0700  In: 2093.8 [P.O.:290; I.V.:1803.8]  Out: -   Last 3 Recorded Weights in this Encounter    12/12/21 0100 12/13/21 0800 12/14/21 0400   Weight: 68.2 kg (150 lb 5.7 oz) 67.9 kg (149 lb 11.1 oz) 67.9 kg (149 lb 11.1 oz)      Physical exam:   GEN: NAD  NECK- Supple  RESP: no distress  NEURO:non focal    Seen from outside of the room    Chart reviewed. Pertinent Notes reviewed.      Data Review :  Recent Labs     12/16/21  0524 12/15/21  0423 12/14/21  0513 12/13/21 2037 12/13/21 2037    137 141   < > 139   K 4.0 4.3 4.1   < > 4.0    103 108   < > 107   CO2 22 27 27   < > 25   BUN 70* 40* 49*   < > 43*   CREA 5.01* 3.50* 3.94*   < > 3.36*   * 233* 101*   < > 103*   CA 8.4* 8.2* 8.0*   < > 8.2*   MG --  2.6* 2.7*  --  2.4   PHOS  --  3.8 3.3  --  2.9    < > = values in this interval not displayed. Recent Labs     12/16/21  1310 12/16/21  0322 12/15/21  1646 12/15/21  0423 12/15/21  0423 12/14/21  1623 12/14/21  0513   WBC  --  8.7  --   --  9.0  --  9.5   HGB 8.5* 8.6* 8.6*   < > 8.1*   < > 6.9*   HCT  --  27.8*  --   --  25.2*  --  22.2*   PLT  --  84*  --   --  69*  --  67*    < > = values in this interval not displayed. No results for input(s): FE, TIBC, PSAT, FERR in the last 72 hours.    Medication list  reviewed  Current Facility-Administered Medications   Medication Dose Route Frequency    insulin lispro (HUMALOG) injection   SubCUTAneous AC&HS    metoclopramide HCl (REGLAN) injection 5 mg  5 mg IntraVENous Q12H    oxyCODONE IR (ROXICODONE) tablet 5 mg  5 mg Oral Q4H PRN    oxyCODONE IR (ROXICODONE) tablet 10 mg  10 mg Oral Q4H PRN    TPN ADULT-CENTRAL AA 5% D15%-MVI W/ VIT K RCH   IntraVENous CONTINUOUS    VANCOMYCIN INFORMATION NOTE   Other Rx Dosing/Monitoring    0.9% sodium chloride infusion 250 mL  250 mL IntraVENous PRN    diphenhydrAMINE (BENADRYL) capsule 25 mg  25 mg Oral QHS PRN    balsam peru-castor oiL (VENELEX) ointment   Topical Q12H    0.9% sodium chloride infusion 250 mL  250 mL IntraVENous PRN    epoetin emilie-epbx (RETACRIT) injection 20,000 Units  20,000 Units SubCUTAneous Q TUE, THU & SAT    glucagon (GLUCAGEN) injection 1 mg  1 mg IntraMUSCular PRN    dextrose (D50W) injection syrg 12.5-25 g  12.5-25 g IntraVENous PRN    simethicone (MYLICON) 98DZ/3.7AA oral drops 80 mg  1.2 mL Oral Multiple    [Held by provider] calcium acetate(phosphat bind) (PHOSLO) capsule 1,334 mg  2 Capsule Per NG tube TID WITH MEALS    albuterol-ipratropium (DUO-NEB) 2.5 MG-0.5 MG/3 ML  3 mL Nebulization Q4H PRN    pantoprazole (PROTONIX) 40 mg in 0.9% sodium chloride 10 mL injection  40 mg IntraVENous Q12H    albumin human 25% (BUMINATE) solution 25 g  25 g IntraVENous DIALYSIS PRN    alcohol 62% (NOZIN) nasal  1 Ampule  1 Ampule Topical Q12H    chlorhexidine (PERIDEX) 0.12 % mouthwash 15 mL  15 mL Oral Q12H    acetaminophen (TYLENOL) tablet 650 mg  650 mg Oral Q6H PRN    [Held by provider] L.acidophilus-paracasei-S.thermophil-bifidobacter (RISAQUAD) 8 billion cell capsule  1 Capsule Oral DAILY    sodium chloride (NS) flush 5-40 mL  5-40 mL IntraVENous Q8H    sodium chloride (NS) flush 5-40 mL  5-40 mL IntraVENous PRN    polyethylene glycol (MIRALAX) packet 17 g  17 g Oral DAILY PRN    ondansetron (ZOFRAN) injection 4 mg  4 mg IntraVENous Q6H PRN          Antonieta Kearney MD              [facility]  [facility] / [facility], [address]  [place], [address]  Phone - [phone]               Fax - [phone]

## 2021-12-16 NOTE — PROGRESS NOTES
1900- Bedside shift report received from Akil Angeles, formerly [facility] and Prem zavala RN    2000- Shift assessment complete, pt resting comfortably in the chair. Currently A&O x4 and following commands appropriately. Pulses are palpable in BUE and BLE. Sinus tach with stable blood pressure. TPN currently infusing at 75 ml/hr. See flowsheets for more details. 2153- Pt assisted x1 to bathroom and back to bed. Tolerated movement well. 0000- Reassessment complete, pt resting comfortably in bed. Pt still repositioning self appropriately in bed, call bell in reach. No changes noted from previous assessment, see flowsheets for more details. 0252- PRN morphine given for pain. 0400- Reassessment complete, pt sleeping comfortably in bed. No changes noted from previous assessment, see flowsheets for more details.      0700- Bedside shift report given to Akil Angeles RN and Prem zavala formerly [facility]

## 2021-12-16 NOTE — PROGRESS NOTES
Hospitalist Progress Note    NAME: Spike Vidal   :  1975   MRN:  [id number]       Assessment / Plan:    Hemorrhagic shock  Upper GI bleed  Ileus   Protein calorie malnutrition   transaminitis   EGD: distal esophageal ulcer with large clots in stomach  Repeat EGD: distal esophageal ulcer with blood in stomach  Hb stable, s/p transfusion of: 11 units PRBC, 3 units Plasma, 2 Cryoprecipitate, 3 units Plts  GI hold off doing a 3rd EGD  Gen Cheko not need for surgical intervention now  PPI  Reglan   Poor oral intake   Cont TPN  Epogen     B/l pleural effusion  Covid Pna  B/l Pna  Sepsis   Recent Streptococcus intermedius bacteremia (2021)  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 Code status: Full  Prophylaxis: SCD's  Recommended Disposition: SAH/Rehab  Anticipated Discharge Date:  >48 hours        Subjective:     Discussed with RN events overnight.    No complaints other than poor oral intake  Afebrile  Denies CP and SOB  Had brown BMs today   Had HD today     Review of Systems:  Symptom Y/N Comments  Symptom Y/N Comments   Fever/Chills    Chest Pain     Poor Appetite    Edema     Cough    Abdominal Pain     Sputum    Joint Pain     SOB/ZAMUDIO    Pruritis/Rash     Nausea/vomit    Tolerating PT/OT     Diarrhea    Tolerating Diet     Constipation    Other PO intake: No data found. Wt Readings from Last 10 Encounters:   12/14/21 67.9 kg (149 lb 11.1 oz)   11/17/21 66.1 kg (145 lb 12.8 oz)   04/07/21 66.8 kg (147 lb 4.3 oz)   03/27/21 67.4 kg (148 lb 9.4 oz)   11/08/20 68.6 kg (151 lb 4.8 oz)   11/07/20 67.1 kg (148 lb)   10/20/20 67.5 kg (148 lb 13 oz)   09/13/20 70 kg (154 lb 5.2 oz)   09/10/20 70 kg (154 lb 5.2 oz)   09/08/20 66.7 kg (147 lb 0.8 oz)       Objective:     VITALS:   Last 24hrs VS reviewed since prior progress note. Most recent are:  Patient Vitals for the past 24 hrs:   Temp Pulse Resp BP SpO2   12/16/21 0730 98.2 °F (36.8 °C) 84 23 127/81 97 %   12/16/21 0600  (!) 105 (!) 33  97 %   12/16/21 0500  (!) 107 30  99 %   12/16/21 0400 98.7 °F (37.1 °C) (!) 115 (!) 32 (!) 140/88 97 %   12/16/21 0300  (!) 103 26  99 %   12/16/21 0200  97 24  98 %   12/16/21 0100  (!) 104 30  98 %   12/16/21 0000 98.4 °F (36.9 °C) 98 27 (!) 141/93 99 %   12/15/21 2300  98 29  100 %   12/15/21 2200  99 19  100 %   12/15/21 2100  (!) 106 26  100 %   12/15/21 2000 98.7 °F (37.1 °C) (!) 107 (!) 31 (!) 130/90 98 %   12/15/21 1630 98.3 °F (36.8 °C) (!) 101 26 122/81 100 %   12/15/21 1200 98.7 °F (37.1 °C) (!) 106 27 137/88 100 %       Intake/Output Summary (Last 24 hours) at 12/16/2021 9676  Last data filed at 12/16/2021 0700  Gross per 24 hour   Intake 1968.75 ml   Output    Net 1968.75 ml        I had a face to face encounter, and independently examined this patient on 12/16/2021, as outlined below:    PHYSICAL EXAM:  General:    No distress     HEENT: Atraumatic, anicteric sclerae, pink conjunctivae, MMM  Neck:  Supple, symmetrical  Lungs:   CTA. No Wheezing/Rhonchi. No rales. No tenderness. No Accessory muscle use. Heart:   Regular rhythm. No murmur. No JVD. Rt Fem line site intact. Left UE AVF w + thrill   GI/:   Soft. NT. ND. BS normal  Extremities: + edema. No cyanosis. No clubbing. Skin:     Not pale. Not Jaundiced.  No rashes   Psych:  Good insight. Not depressed. Not anxious or agitated. Neurologic: Alert and oriented X 4. EOMs intact. No facial asymmetry. No slurred speech. Symmetrical strength, Sensation grossly intact. Labs     I reviewed today's most current labs and imaging studies. Pertinent labs include:  Recent Labs     12/16/21  0322 12/15/21  1646 12/15/21  0423 12/14/21  1623 12/14/21  0513   WBC 8.7  --  9.0  --  9.5   HGB 8.6* 8.6* 8.1*   < > 6.9*   HCT 27.8*  --  25.2*  --  22.2*   PLT 84*  --  69*  --  67*    < > = values in this interval not displayed. Recent Labs     12/16/21  0524 12/15/21  0423 12/14/21  0513 12/13/21 2037 12/13/21 2037    137 141   < > 139   K 4.0 4.3 4.1   < > 4.0    103 108   < > 107   CO2 22 27 27   < > 25   * 233* 101*   < > 103*   BUN 70* 40* 49*   < > 43*   CREA 5.01* 3.50* 3.94*   < > 3.36*   CA 8.4* 8.2* 8.0*   < > 8.2*   MG  --  2.6* 2.7*  --  2.4   PHOS  --  3.8 3.3  --  2.9   ALB  --  1.8*  --   --   --    TBILI  --  0.8  --   --   --    ALT  --  54  --   --   --    INR  --  1.2*  --   --   --     < > = values in this interval not displayed. XR ABD (KUB)    Result Date: 12/10/2021  Femoral catheter as above. XR ABD (KUB)    Result Date: 12/8/2021  Gaseous distention of the large and small bowel. No evidence of free intraperitoneal air on this single view. CT ABD PELV WO CONT    Result Date: 12/8/2021  1. Generalized gaseous distention of large and small bowel. Findings are consistent with a generalized ileus. 2. No evidence of bowel perforation. 3. Continued gastric distention with hemorrhagic material, similar to prior study. 4. NG tube in the stomach. 5. Covid 19 pneumonia has worsened in the visualized lung bases. 6. Continued small to moderate bilateral pleural effusions. 7. Severe diffuse calcific atherosclerosis. 8. Evidence of end-stage renal disease. CT ABD PELV W WO CONT    Result Date: 12/7/2021  1.  There is a large amount of hemorrhage within a distended stomach, extending into the duodenum. An active bleed is not seen on this examination. However, delayed images were not performed, somewhat limiting the study. 2. Bilateral pleural effusions with bilateral pneumonia. XR CHEST PORT    Result Date: 12/9/2021  Unchanged patchy bilateral airspace disease. XR CHEST PORT    Result Date: 12/8/2021  NG tube is coiled in the patient's throat, and terminates in the mid/distal thoracic esophagus. Recommend repositioning. XR CHEST PORT    Result Date: 12/7/2021  No change. XR ABD PORT  1 V    Result Date: 12/7/2021  1. Rotated film. Femoral line overlies the right pelvis     XR ABD PORT  1 V    Result Date: 12/7/2021  Distended stomach, NG tube in place. IR INSERT NON TUNL CVC OVER 5 YRS    Result Date: 12/10/2021  Successful exchange of left femoral Jorge A catheter. The catheter is ready for immediate use. IR INSERT NON TUNL CVC OVER 5 YRS    Result Date: 12/7/2021  1. Successful placement of left common femoral temporary dialysis catheter. The catheter is ready for immediate use. No results found. 11/05/21    ECHO EVER W OR WO CONTRAST 11/11/2021 11/11/2021    Interpretation Summary  · No signs for endocarditis by EVER. · LV: Estimated LVEF is 35 - 40%. Normal cavity size. Mildly increased wall thickness. Moderately reduced systolic function. · MV: Mitral valve leaflet calcification. Mild mitral annular calcification. 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 · TV: Moderate tricuspid valve regurgitation is present. · PA: Moderate pulmonary hypertension.     Signed by: Selwyn Cao MD on 11/11/2021 10:42 AM       Current Medications:     Current Facility-Administered Medications:     insulin lispro (HUMALOG) injection, , SubCUTAneous, AC&HS, Hollis Don MD    oxyCODONE IR (ROXICODONE) tablet 5 mg, 5 mg, Oral, Q4H PRN, Purveyor, Salena Goodpasture, ARTHUR    oxyCODONE IR (Xavier Van) tablet 10 mg, 10 mg, Oral, Q4H PRN, Vamshi Chandra, ACNP, 10 mg at 12/16/21 0323    TPN ADULT-CENTRAL AA 5% D15%-MVI W/ VIT K RCH, , IntraVENous, CONTINUOUS, Garrick Staley MD, Last Rate: 75 mL/hr at 12/15/21 1957, New Bag at 12/15/21 1957    VANCOMYCIN INFORMATION NOTE, , Other, Rx Dosing/Monitoring, Garrick Staley MD    0.9% sodium chloride infusion 250 mL, 250 mL, IntraVENous, PRN, Garrick Staley MD    diphenhydrAMINE (BENADRYL) capsule 25 mg, 25 mg, Oral, QHS PRN, Garrick Staley MD, 25 mg at 12/15/21 2120    balsam peru-castor oiL (Courtney Seals) ointment, , Topical, Q12H, Garrick Staley MD, Given at 12/16/21 0816    0.9% sodium chloride infusion 250 mL, 250 mL, IntraVENous, PRN, Payal Mcgovern NP    epoetin emilie-epbx (RETACRIT) injection 20,000 Units, 20,000 Units, SubCUTAneous, Q TUE, THU & SAT, Christiane Roth MD, 20,000 Units at 12/14/21 2156    glucagon (GLUCAGEN) injection 1 mg, 1 mg, IntraMUSCular, PRN, Toshia Colon MD    dextrose (D50W) injection syrg 12.5-25 g, 12.5-25 g, IntraVENous, PRN, Toshia Colon MD, 25 g at 12/09/21 1719    simethicone (MYLICON) 33KR/4.2IY oral drops 80 mg, 1.2 mL, Oral, Multiple, Scarlet Gaitan MD    [Held by provider] calcium acetate(phosphat bind) (PHOSLO) capsule 1,334 mg, 2 Capsule, Per NG tube, TID WITH MEALS, Toshia Colon MD    metoclopramide HCl (REGLAN) injection 5 mg, 5 mg, IntraVENous, Q8H, Toshia Colon MD, 5 mg at 12/16/21 0610    albuterol-ipratropium (DUO-NEB) 2.5 MG-0.5 MG/3 ML, 3 mL, Nebulization, Q4H PRN, Toshia Colon MD    pantoprazole (PROTONIX) 40 mg in 0.9% sodium chloride 10 mL injection, 40 mg, IntraVENous, Q12H, Kleber Butts MD, 40 mg at 12/16/21 0815    albumin human 25% (BUMINATE) solution 25 g, 25 g, IntraVENous, DIALYSIS PRN, Kleber Butts MD, 25 g at 12/07/21 1318    alcohol 62% (NOZIN) nasal  1 Ampule, 1 Ampule, Topical, Q12H, Toshia Colon MD, 1 Ampule at 12/16/21 0815   chlorhexidine (PERIDEX) 0.12 % mouthwash 15 mL, 15 mL, Oral, Q12H, aJkob Pace MD, 15 mL at 12/16/21 0816    acetaminophen (TYLENOL) tablet 650 mg, 650 mg, Oral, Q6H PRN, Migue King MD, 650 mg at 12/15/21 0508    [Held by provider] L.acidophilus-paracasei-S.thermophil-bifidobacter (RISAQUAD) 8 billion cell capsule, 1 Capsule, Oral, DAILY, Shweta Melo MD, 1 Capsule at 12/05/21 0909    sodium chloride (NS) flush 5-40 mL, 5-40 mL, IntraVENous, Q8H, Shweta Grimm MD, 10 mL at 12/16/21 0530    sodium chloride (NS) flush 5-40 mL, 5-40 mL, IntraVENous, PRN, Shweta Melo MD, 10 mL at 12/16/21 0815    polyethylene glycol (MIRALAX) packet 17 g, 17 g, Oral, DAILY PRN, Froylan Mccord MD    [DISCONTINUED] ondansetron (ZOFRAN ODT) tablet 4 mg, 4 mg, Oral, Q8H PRN **OR** ondansetron (ZOFRAN) injection 4 mg, 4 mg, IntraVENous, Q6H PRN, Reg CAMPOVERDE MD, 4 mg at 12/06/21 0149     Procedures: see electronic medical records for all procedures/Xrays and details which were not copied into this note but were reviewed prior to creation of Plan. Reviewed most current lab test results and cultures  YES  Reviewed most current radiology test results   YES  Review and summation of old records today    NO  Reviewed patient's current orders and MAR    YES  PMH/SH reviewed - no change compared to H&P  ________________________________________________________________________  Care Plan discussed with:    Comments   Patient x    Family      RN x    Care Manager     Consultant                        Multidiciplinary team rounds were held today with , nursing, pharmacist and clinical coordinator. Patient's plan of care was discussed; medications were reviewed and discharge planning was addressed.      ________________________________________________________________________  Total NON critical care TIME: 35    Minutes    Total CRITICAL CARE TIME Spent:   Minutes non procedure based      Comments   >50% of visit spent in counseling and coordination of care x     This includes time during multidisciplinary rounds if indicated above   ________________________________________________________________________  Angela Randolph MD

## 2021-12-16 NOTE — PROGRESS NOTES
Hemodialysis / [phone]    Vitals Pre Post Assessment Pre Post   BP BP: (!) 130/94 (12/16/21 1307) 152/85 LOC A & O  x4 A & O x 4   HR Pulse (Heart Rate): 65 (12/16/21 1307) 106 Lungs Diminished bilat Diminished bilat   Resp Resp Rate: 18 (12/16/21 1040) 18 Cardiac WNL WNL   Temp Temp: 98.9 °F (37.2 °C) (12/16/21 1300) 98.8 Skin Dry and intact Dry and intact   Weight Pre-Dialysis Weight: 66.7 kg (147 lb) (12/02/21 0743) 64.9kg Edema 2+ BLE 2+ BLE   Tele status Remote tele Remote tele Pain Pain Intensity 1: 0 (12/16/21 1040) 0     Orders   Duration: Start: 3490 End: 1625 Total: 3hr 20min   Dialyzer: Dialyzer/Set Up Inspection: Revaclear (12/14/21 0820)   K Bath: Dialysate K (mEq/L): 3 (12/14/21 0820)   Ca Bath: Dialysate CA (mEq/L): 2.5 (12/14/21 0820)   Na: Dialysate NA (mEq/L): 138 (12/14/21 0820)   Bicarb: Dialysate HCO3 (mEq/L): 35 (12/14/21 0820)   Target Fluid Removal: Goal/Amount of Fluid to Remove (mL): 1500 mL (12/14/21 0820)     Access   Type & Location: L upper arm AVG   Comments:                       + B/T, flushes and aspirates easily                 Labs   HBsAg (Antigen) / date:       negative      11/11/21                               HBsAb (Antibody) / date: Immune 11/11/21   Source: [facility]   Obtained/Reviewed  Critical Results Called HGB   Date Value Ref Range Status   12/16/2021 8.6 (L) 12.1 - 17.0 g/dL Final     Potassium   Date Value Ref Range Status   12/16/2021 4.0 3.5 - 5.1 mmol/L Final     Calcium   Date Value Ref Range Status   12/16/2021 8.4 (L) 8.5 - 10.1 MG/DL Final     BUN   Date Value Ref Range Status   12/16/2021 70 (H) 6 - 20 MG/DL Final     Comment:     INVESTIGATED PER DELTA CHECK PROTOCOL     Creatinine   Date Value Ref Range Status   12/16/2021 5.01 (H) 0.70 - 1.30 MG/DL Final     Comment:     INVESTIGATED PER DELTA CHECK PROTOCOL        Meds Given   Name Dose Route   none                 Adequacy / Fluid    Total Liters Process: 73.4 L    Net Fluid Removed: 1800mL Comments   Time Out Done:   (Time) 1300   Admitting Diagnosis: covid   Consent obtained/signed: yes   Machine / RO # [id number]/ [id number]   Primary Nurse Rpt Pre: Leandro Quevedo RN   Primary Nurse Rpt Post: Leandro Quevedo RN   Pt Education: procedure   Care Plan: To continue HD   Pts outpatient clinic:      Tx Summary   Comments:                       6402: Treatment started  1400: Pt requested to have 2 L taken off. Received order from Dr. Mike Freitas to take off 2- 2.5L.   1625: Patient taken 21 217  early due to clotting, notified Dr. Mike Freitas.   Patient treatment ended,all blood returned, hemostasis achieved, bed in lowest position, call bell within reach

## 2021-12-16 NOTE — PROGRESS NOTES
Spiritual Care Assessment/Progress Note  [facility]      NAME: Claudell Patch      MRN: [id number]  AGE: 42 y.o.  SEX: male  Sikh Affiliation: Worship   Language: English     12/16/2021     Total Time (in minutes): 8     Spiritual Assessment begun in [facility] PROGRESSIVE CARE through conversation with:         [x]Patient        [] Family    [] Friend(s)        Reason for Consult: Initial/Spiritual assessment, patient floor     Spiritual beliefs: (Please include comment if needed)     [x] Identifies with a brown tradition: Worship         [] Supported by a brown community:            [] Claims no spiritual orientation:           [] Seeking spiritual identity:                [] Adheres to an individual form of spirituality:           [] Not able to assess:                           Identified resources for coping:      [] Prayer                               [] Music                  [] Guided Imagery     [x] Family/friends                 [] Pet visits     [] Devotional reading                         [] Unknown     [] Other:                                               Interventions offered during this visit: (See comments for more details)    Patient Interventions: Affirmation of emotions/emotional suffering,Catharsis/review of pertinent events in supportive environment,Coping skills reviewed/reinforced,Initial/Spiritual assessment, patient floor           Plan of Care:     [] Support spiritual and/or cultural needs    [] Support AMD and/or advance care planning process      [] Support grieving process   [] Coordinate Rites and/or Rituals    [] Coordination with community clergy   [] No spiritual needs identified at this time   [] Detailed Plan of Care below (See Comments)  [] Make referral to Music Therapy  [] Make referral to Pet Therapy     [] Make referral to Addiction services  [] Make referral to [facility]  [] Make referral to Spiritual Care Partner  [] No future visits requested        [x] Contact Spiritual Care for further referrals     Comments:     Initial Spiritual Care Assessment visit for Mr. Nicole Morgan in 2241. Reviewed the chart before visit. Due to the medical restriction,  made a phone call visit. Patient was alert, reported he is feeling better. He also shared that he has good family support.  explored any spiritual needs he has, he denied. Advised of  availability.       [address] [facility] H[address]CTOR Zepeda.Div,Th.M, [address] Provider   Paging Service [phone])

## 2021-12-17 LAB
ALBUMIN SERPL-MCNC: 2 G/DL (ref 3.5–5)
ALBUMIN/GLOB SERPL: 0.5 {RATIO} (ref 1.1–2.2)
ALP SERPL-CCNC: 141 U/L (ref 45–117)
ALT SERPL-CCNC: 45 U/L (ref 12–78)
ANION GAP SERPL CALC-SCNC: 7 MMOL/L (ref 5–15)
AST SERPL-CCNC: 29 U/L (ref 15–37)
BASOPHILS # BLD: 0.1 K/UL (ref 0–0.1)
BASOPHILS NFR BLD: 1 % (ref 0–1)
BILIRUB DIRECT SERPL-MCNC: 0.3 MG/DL (ref 0–0.2)
BILIRUB SERPL-MCNC: 0.6 MG/DL (ref 0.2–1)
BUN SERPL-MCNC: 47 MG/DL (ref 6–20)
BUN/CREAT SERPL: 13 (ref 12–20)
CALCIUM SERPL-MCNC: 8.3 MG/DL (ref 8.5–10.1)
CHLORIDE SERPL-SCNC: 101 MMOL/L (ref 97–108)
CO2 SERPL-SCNC: 27 MMOL/L (ref 21–32)
CREAT SERPL-MCNC: 3.7 MG/DL (ref 0.7–1.3)
DIFFERENTIAL METHOD BLD: ABNORMAL
EOSINOPHIL # BLD: 0 K/UL (ref 0–0.4)
EOSINOPHIL NFR BLD: 0 % (ref 0–7)
ERYTHROCYTE [DISTWIDTH] IN BLOOD BY AUTOMATED COUNT: 19.1 % (ref 11.5–14.5)
GLOBULIN SER CALC-MCNC: 4.2 G/DL (ref 2–4)
GLUCOSE BLD STRIP.AUTO-MCNC: 107 MG/DL (ref 65–117)
GLUCOSE BLD STRIP.AUTO-MCNC: 94 MG/DL (ref 65–117)
GLUCOSE BLD STRIP.AUTO-MCNC: 94 MG/DL (ref 65–117)
GLUCOSE SERPL-MCNC: 83 MG/DL (ref 65–100)
HCT VFR BLD AUTO: 28 % (ref 36.6–50.3)
HGB BLD-MCNC: 8.9 G/DL (ref 12.1–17)
IMM GRANULOCYTES # BLD AUTO: 0 K/UL (ref 0–0.04)
IMM GRANULOCYTES NFR BLD AUTO: 0 % (ref 0–0.5)
LYMPHOCYTES # BLD: 1.1 K/UL (ref 0.8–3.5)
LYMPHOCYTES NFR BLD: 15 % (ref 12–49)
MAGNESIUM SERPL-MCNC: 2.3 MG/DL (ref 1.6–2.4)
MCH RBC QN AUTO: 29.2 PG (ref 26–34)
MCHC RBC AUTO-ENTMCNC: 31.8 G/DL (ref 30–36.5)
MCV RBC AUTO: 91.8 FL (ref 80–99)
MONOCYTES # BLD: 1.2 K/UL (ref 0–1)
MONOCYTES NFR BLD: 16 % (ref 5–13)
NEUTS SEG # BLD: 5.3 K/UL (ref 1.8–8)
NEUTS SEG NFR BLD: 69 % (ref 32–75)
NRBC # BLD: 0.05 K/UL (ref 0–0.01)
NRBC BLD-RTO: 0.6 PER 100 WBC
PLATELET # BLD AUTO: 89 K/UL (ref 150–400)
PMV BLD AUTO: 12.5 FL (ref 8.9–12.9)
POTASSIUM SERPL-SCNC: 3.6 MMOL/L (ref 3.5–5.1)
PROT SERPL-MCNC: 6.2 G/DL (ref 6.4–8.2)
RBC # BLD AUTO: 3.05 M/UL (ref 4.1–5.7)
SERVICE CMNT-IMP: NORMAL
SODIUM SERPL-SCNC: 135 MMOL/L (ref 136–145)
WBC # BLD AUTO: 7.8 K/UL (ref 4.1–11.1)

## 2021-12-17 PROCEDURE — 85025 COMPLETE CBC W/AUTO DIFF WBC: CPT

## 2021-12-17 PROCEDURE — 83735 ASSAY OF MAGNESIUM: CPT

## 2021-12-17 PROCEDURE — 82962 GLUCOSE BLOOD TEST: CPT

## 2021-12-17 PROCEDURE — 74011250637 HC RX REV CODE- 250/637: Performed by: GENERAL ACUTE CARE HOSPITAL

## 2021-12-17 PROCEDURE — 36415 COLL VENOUS BLD VENIPUNCTURE: CPT

## 2021-12-17 PROCEDURE — C9113 INJ PANTOPRAZOLE SODIUM, VIA: HCPCS | Performed by: INTERNAL MEDICINE

## 2021-12-17 PROCEDURE — 74011250637 HC RX REV CODE- 250/637: Performed by: NURSE PRACTITIONER

## 2021-12-17 PROCEDURE — 80076 HEPATIC FUNCTION PANEL: CPT

## 2021-12-17 PROCEDURE — 65660000000 HC RM CCU STEPDOWN

## 2021-12-17 PROCEDURE — 80048 BASIC METABOLIC PNL TOTAL CA: CPT

## 2021-12-17 PROCEDURE — 74011250636 HC RX REV CODE- 250/636: Performed by: INTERNAL MEDICINE

## 2021-12-17 PROCEDURE — 74011000250 HC RX REV CODE- 250: Performed by: INTERNAL MEDICINE

## 2021-12-17 PROCEDURE — 74011250637 HC RX REV CODE- 250/637: Performed by: INTERNAL MEDICINE

## 2021-12-17 PROCEDURE — 74011250636 HC RX REV CODE- 250/636: Performed by: GENERAL ACUTE CARE HOSPITAL

## 2021-12-17 RX ORDER — PANTOPRAZOLE SODIUM 40 MG/1
40 TABLET, DELAYED RELEASE ORAL
Status: DISCONTINUED | OUTPATIENT
Start: 2021-12-17 | End: 2021-12-24 | Stop reason: HOSPADM

## 2021-12-17 RX ORDER — METOCLOPRAMIDE 10 MG/1
5 TABLET ORAL 2 TIMES DAILY
Status: DISCONTINUED | OUTPATIENT
Start: 2021-12-17 | End: 2021-12-24 | Stop reason: HOSPADM

## 2021-12-17 RX ORDER — CALCIUM CARBONATE 200(500)MG
400 TABLET,CHEWABLE ORAL
Status: DISCONTINUED | OUTPATIENT
Start: 2021-12-17 | End: 2021-12-24 | Stop reason: HOSPADM

## 2021-12-17 RX ADMIN — OXYCODONE 10 MG: 5 TABLET ORAL at 17:45

## 2021-12-17 RX ADMIN — PANTOPRAZOLE SODIUM 40 MG: 40 TABLET, DELAYED RELEASE ORAL at 17:45

## 2021-12-17 RX ADMIN — Medication: at 09:00

## 2021-12-17 RX ADMIN — Medication 1 AMPULE: at 21:39

## 2021-12-17 RX ADMIN — Medication: at 21:39

## 2021-12-17 RX ADMIN — METOCLOPRAMIDE 5 MG: 10 TABLET ORAL at 17:45

## 2021-12-17 RX ADMIN — Medication 10 ML: at 06:00

## 2021-12-17 RX ADMIN — CALCIUM CARBONATE (ANTACID) CHEW TAB 500 MG 400 MG: 500 CHEW TAB at 21:36

## 2021-12-17 RX ADMIN — CHLORHEXIDINE GLUCONATE 15 ML: 1.2 RINSE ORAL at 21:39

## 2021-12-17 RX ADMIN — OXYCODONE 10 MG: 5 TABLET ORAL at 13:10

## 2021-12-17 RX ADMIN — Medication 1 AMPULE: at 09:00

## 2021-12-17 RX ADMIN — OXYCODONE 10 MG: 5 TABLET ORAL at 08:51

## 2021-12-17 RX ADMIN — OXYCODONE 10 MG: 5 TABLET ORAL at 21:53

## 2021-12-17 RX ADMIN — CHLORHEXIDINE GLUCONATE 15 ML: 1.2 RINSE ORAL at 08:57

## 2021-12-17 RX ADMIN — SODIUM CHLORIDE 40 MG: 9 INJECTION INTRAMUSCULAR; INTRAVENOUS; SUBCUTANEOUS at 08:46

## 2021-12-17 RX ADMIN — OXYCODONE 10 MG: 5 TABLET ORAL at 02:09

## 2021-12-17 RX ADMIN — METOCLOPRAMIDE 5 MG: 5 INJECTION, SOLUTION INTRAMUSCULAR; INTRAVENOUS at 08:51

## 2021-12-17 NOTE — PROGRESS NOTES
Nephrology Progress Note  Blaise Leblanc     www. [facility] [employer]  Phone - [phone]   Patient: Omari Grewal    YOB: 1975        Date- 12/17/2021   Admit Date: 11/27/2021  CC: Follow up for ESRD       IMPRESSION & PLAN:   · ESRD-- home HD 5 days per week- Follows up with Dr Masoud Whiteside at [facility] ( now [facility] for IV antibiotics.)  · Covid 19 infection  · Hemorrhagic shock  · Acute blood loss anemia from esophageal bleed from esophagitis  · hyperkalemia  · Left arm swelling- s/p angioplasty of left subclavian vein  · Gram positive sepsis from dental abcess  · s/p lead extraction at [facility]  · Anemia of ckd  · Hx of renal tx in past times 2.  · Hypertension  · CAD  · Failed RTX times 2         H/o DVT, s/p ivc filter/ h/o HIT      PLAN-  · NO DIALYSIS TODAY  · Plan for hd on TTS schedule  · epogen for anemia     Subjective: Interval History:   BP STABLE  Doing well  S/p hd yesterday    Objective:   Vitals:    12/16/21 2356 12/17/21 0400 12/17/21 0437 12/17/21 0734   BP: (!) 142/95 106/72  108/74   Pulse: 96 81  90   Resp: 23 20  18   Temp: 98.4 °F (36.9 °C) 97.4 °F (36.3 °C)  98.2 °F (36.8 °C)   TempSrc:       SpO2: 95% 95%  98%   Weight:   61.5 kg (135 lb 9.6 oz)    Height:          12/16 0701 - 12/17 0700  In: 1215 [P.O.:390; I.V.:825]  Out: -   Last 3 Recorded Weights in this Encounter    12/13/21 0800 12/14/21 0400 12/17/21 0437   Weight: 67.9 kg (149 lb 11.1 oz) 67.9 kg (149 lb 11.1 oz) 61.5 kg (135 lb 9.6 oz)      Physical exam:   GEN: NAD  NECK- Supple  RESP: no distress  NEURO:non focal        Seen from outside of the room    Chart reviewed. Pertinent Notes reviewed.      Data Review :  Recent Labs     12/17/21  0438 12/16/21  1310 12/16/21  0524 12/15/21  0423 12/15/21  0423   * 136 138   < > 137   K 3.6 4.2 4.0   < > 4.3    104 105   < > 103   CO2 27 21 22   < > 27   BUN 47* 77* 70*   < > 40*   CREA 3.70* 5.40* 5.01*   < > 3.50*   GLU 83 94 131*   < > 233*   CA 8.3* 8.4* 8.4*   < > 8.2*   MG 2.3  --   --   --  2.6*   PHOS  --   --   --   --  3.8    < > = values in this interval not displayed. Recent Labs     12/17/21  0438 12/16/21  1310 12/16/21  0322 12/15/21  1646 12/15/21  0423   WBC 7.8  --  8.7  --  9.0   HGB 8.9* 8.5* 8.6*   < > 8.1*   HCT 28.0*  --  27.8*  --  25.2*   PLT 89*  --  84*  --  69*    < > = values in this interval not displayed. No results for input(s): FE, TIBC, PSAT, FERR in the last 72 hours.    Medication list  reviewed  Current Facility-Administered Medications   Medication Dose Route Frequency    insulin lispro (HUMALOG) injection   SubCUTAneous AC&HS    metoclopramide HCl (REGLAN) injection 5 mg  5 mg IntraVENous Q12H    TPN ADULT-CENTRAL AA 5% D15%-MVI W/ VIT K RCH   IntraVENous CONTINUOUS    oxyCODONE IR (ROXICODONE) tablet 5 mg  5 mg Oral Q4H PRN    oxyCODONE IR (ROXICODONE) tablet 10 mg  10 mg Oral Q4H PRN    VANCOMYCIN INFORMATION NOTE   Other Rx Dosing/Monitoring    diphenhydrAMINE (BENADRYL) capsule 25 mg  25 mg Oral QHS PRN    balsam peru-castor oiL (VENELEX) ointment   Topical Q12H    epoetin emilie-epbx (RETACRIT) injection 20,000 Units  20,000 Units SubCUTAneous Q TUE, THU & SAT    glucagon (GLUCAGEN) injection 1 mg  1 mg IntraMUSCular PRN    dextrose (D50W) injection syrg 12.5-25 g  12.5-25 g IntraVENous PRN    simethicone (MYLICON) 44SM/3.4JF oral drops 80 mg  1.2 mL Oral Multiple    [Held by provider] calcium acetate(phosphat bind) (PHOSLO) capsule 1,334 mg  2 Capsule Per NG tube TID WITH MEALS    albuterol-ipratropium (DUO-NEB) 2.5 MG-0.5 MG/3 ML  3 mL Nebulization Q4H PRN    pantoprazole (PROTONIX) 40 mg in 0.9% sodium chloride 10 mL injection  40 mg IntraVENous Q12H    albumin human 25% (BUMINATE) solution 25 g  25 g IntraVENous DIALYSIS PRN    alcohol 62% (NOZIN) nasal  1 Ampule  1 Ampule Topical Q12H    chlorhexidine (PERIDEX) 0.12 % mouthwash 15 mL  15 mL Oral Q12H    acetaminophen (TYLENOL) tablet 650 mg  650 mg Oral Q6H PRN    [Held by provider] L.acidophilus-paracasei-S.thermophil-bifidobacter (RISAQUAD) 8 billion cell capsule  1 Capsule Oral DAILY    sodium chloride (NS) flush 5-40 mL  5-40 mL IntraVENous Q8H    sodium chloride (NS) flush 5-40 mL  5-40 mL IntraVENous PRN    polyethylene glycol (MIRALAX) packet 17 g  17 g Oral DAILY PRN    ondansetron (ZOFRAN) injection 4 mg  4 mg IntraVENous Q6H PRN          Philip Ochoa MD              [facility]  [facility] [address]  [place], [address]  Phone - [phone]               Fax - [phone]

## 2021-12-17 NOTE — PROGRESS NOTES
Per Dr Lauren Downing femoral CVL to be removed even if pt does not have peripheral access since pt is medically stable just awaiting placement at SNF. PICC team contacted to attempt peripheral line placement. Labs can be done during dialysis if pt is still here.

## 2021-12-17 NOTE — PROGRESS NOTES
1900 Verbal shift change report given to Yin Babin RN (oncoming nurse) by Negro Ramos (offgoing nurse). Report included the following information SBAR, Kardex, ED Summary, Procedure Summary, MAR and Cardiac Rhythm sinus tachy. 2143 Patient reports pain 7/10, admin Roxicodone 10 mg for patient discomfort.     0209 Patient reports pain 6/10, admin Roxicodone 10 mg for pain. End of Shift Note    0700 Bedside shift change report given to Negro Ramos (oncoming nurse) by Yin Babin RN (offgoing nurse). Report included the following information SBAR, Kardex, ED Summary, Intake/Output, MAR and Cardiac Rhythm SR    Shift worked:  6915-1120     Shift summary and any significant changes:     none     Concerns for physician to address:  none     Zone phone for oncoming shift:          Activity:  Activity Level: Up with Assistance  Number times ambulated in hallways past shift: 0  Number of times OOB to chair past shift: 2    Cardiac:   Cardiac Monitoring: Yes      Cardiac Rhythm: Sinus Rhythm    Access:   Current line(s): central line     Genitourinary:   Urinary status: anuric    Respiratory:   O2 Device: None (Room air)  Chronic home O2 use?: NO  Incentive spirometer at bedside: NO     GI:  Last Bowel Movement Date: 12/17/21  Current diet:  ADULT ORAL NUTRITION SUPPLEMENT Breakfast, Lunch, Dinner; Clear Liquid  ADULT DIET Full Liquid  DIET ONE TIME MESSAGE  TPN ADULT-CENTRAL AA 5% D15%-MVI W/ VIT K RCH  Passing flatus: YES  Tolerating current diet: yes       Pain Management:   Patient states pain is manageable on current regimen: YES    Skin:  Yong Score: 17  Interventions: increase time out of bed    Patient Safety:  Fall Score:  Total Score: 2  Interventions: bed/chair alarm and gripper socks  High Fall Risk: Yes    Length of Stay:  Expected LOS: 4d 21h  Actual LOS: 20      Yin Babin RN

## 2021-12-17 NOTE — PROGRESS NOTES
Comprehensive Nutrition Assessment    Type and Reason for Visit: Reassess    Nutrition Recommendations/Plan:  Continue GI bland diet  Ensure clear daily  Nepro BID  Wean TPN down to 63 mL/hr for today (1074 kcal, 76g protein, 227g CHO). Consider discontinuing TPN tomorrow pending PO intake/tolerance of diet     Nutrition Assessment:     Chart reviewed; diet advanced to GI bland this morning. Poor PO intake noted since diet advanced on 12/14. Continues on TPN; MD to decide on renewal for today per discussion on PCU rounds. Attempted to contact patient twice but no answer on the phone. Currently has ensure clear ordered but will add nepro as well for more kcal/protein per serving. K+ and phos WNL. BG under control. TPN recommendations above will meet ~56% of kcal needs and provide 1.2 g protein/kg bw. If PO intake showing improvement would consider discontinuing TPN over the weekend. Patient Vitals for the past 168 hrs:   % Diet Eaten   12/17/21 0842 26 - 50%   12/16/21 1738 26 - 50%   12/15/21 1500 1 - 25%   12/15/21 0900 1 - 25%   12/14/21 1800 1 - 25%     Estimated Daily Nutrient Needs:  Energy (kcal): 1896 kcal (BMR 1459 x 1. 3AF); Weight Used for Energy Requirements: Current  Protein (g): 92g (1.5 g/kg bw); Weight Used for Protein Requirements: Current  Fluid (ml/day): 1500 mL or per MD; Method Used for Fluid Requirements: Standard renal    Nutrition Related Findings:    Na 135, K+ 3.6, phos 3.8, BG 17-65-16-09  2-3+ edema  Humalog, Reglan, Protonix       Wounds:    Skin tears       Current Nutrition Therapies:  ADULT ORAL NUTRITION SUPPLEMENT Breakfast, Lunch, Dinner; Clear Liquid  DIET ONE TIME MESSAGE  TPN ADULT-CENTRAL AA 5% D15%-MVI W/ VIT K [facility]  ADULT DIET Regular; GI [place] (GERD/Peptic Ulcer)    Anthropometric Measures:  · Height:  5' 7\" (170.2 cm)  · Current Body Wt:  61.5 kg (135 lb 9.3 oz)    · BMI Category:  Normal weight (BMI 18.5-24. 9)       Nutrition Diagnosis:   · Inadequate protein-energy intake related to  (decreased appette) as evidenced by nutrition support-parenteral nutrition,intake 26-50%,intake 0-25%    Nutrition Interventions:   Food and/or Nutrient Delivery: Continue current diet,Modify oral nutrition supplement,Modify parenteral nutrition  Nutrition Education and Counseling: No recommendations at this time  Coordination of Nutrition Care: Continue to monitor while inpatient,Interdisciplinary rounds    Goals:  PO >50% of meals/supplements and TPN weaned off next 2-4 days       Nutrition Monitoring and Evaluation:   Behavioral-Environmental Outcomes: None identified  Food/Nutrient Intake Outcomes: Food and nutrient intake,Supplement intake,Parenteral nutrition intake/tolerance  Physical Signs/Symptoms Outcomes: Biochemical data,GI status,Fluid status or edema,Hemodynamic status,Weight    Discharge Planning:     Too soon to determine     Electronically signed by Gisele Beavers RD on 12/17/2021 at 10:56 AM    Contact: ext [phone]

## 2021-12-17 NOTE — PROGRESS NOTES
Hospitalist Progress Note    NAME: Shae Valentino   :  1975   MRN:  [id number]       Assessment / Plan:    Hemorrhagic shock  Upper GI bleed  Ileus   Protein calorie malnutrition   transaminitis   EGD: distal esophageal ulcer with large clots in stomach  Repeat EGD: distal esophageal ulcer with blood in stomach  Hb stable, s/p transfusion of: 11 units PRBC, 3 units Plasma, 2 Cryoprecipitate, 3 units Plts  GI hold off doing a 3rd EGD  Gen Cheko not need for surgical intervention now  PPI BID  Reglan   Holding Coumadin   Improving oral intake   DC TPN, may remore Femoral line if able to get PIV  Epogen     B/l pleural effusion  Covid Pna  B/l Pna  Sepsis   Recent Streptococcus intermedius bacteremia (2021)  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 Code status: Full  Prophylaxis: SCD's  Recommended Disposition: [facility]/Rehab  Anticipated Discharge Date:  >48 hours        Subjective:     Discussed with RN events overnight.    No complaints   Better appetite   Afebrile  Denies CP and SOB  Had brown BMs today   Leg swelling     Review of Systems:  Symptom Y/N Comments  Symptom Y/N Comments   Fever/Chills    Chest Pain     Poor Appetite    Edema     Cough    Abdominal Pain     Sputum    Joint Pain     SOB/ZAMUDIO    Pruritis/Rash     Nausea/vomit    Tolerating PT/OT Diarrhea    Tolerating Diet     Constipation    Other       PO intake: No data found. Wt Readings from Last 10 Encounters:   12/17/21 61.5 kg (135 lb 9.6 oz)   11/17/21 66.1 kg (145 lb 12.8 oz)   04/07/21 66.8 kg (147 lb 4.3 oz)   03/27/21 67.4 kg (148 lb 9.4 oz)   11/08/20 68.6 kg (151 lb 4.8 oz)   11/07/20 67.1 kg (148 lb)   10/20/20 67.5 kg (148 lb 13 oz)   09/13/20 70 kg (154 lb 5.2 oz)   09/10/20 70 kg (154 lb 5.2 oz)   09/08/20 66.7 kg (147 lb 0.8 oz)       Objective:     VITALS:   Last 24hrs VS reviewed since prior progress note. Most recent are:  Patient Vitals for the past 24 hrs:   Temp Pulse Resp BP SpO2   12/17/21 1116 98.9 °F (37.2 °C) 99 19 96/65 98 %   12/17/21 0842     98 %   12/17/21 0734 98.2 °F (36.8 °C) 90 18 108/74 98 %   12/17/21 0400 97.4 °F (36.3 °C) 81 20 106/72 95 %   12/16/21 2356 98.4 °F (36.9 °C) 96 23 (!) 142/95 95 %   12/16/21 2000  (!) 102 23 112/74 96 %   12/16/21 1942 98.8 °F (37.1 °C) (!) 101 26 104/70 96 %   12/16/21 1545 98.2 °F (36.8 °C) 91 18 (!) 152/101 98 %   12/16/21 1530  100  (!) 140/97    12/16/21 1515  100  (!) 164/94    12/16/21 1500  (!) 102  (!) 155/102    12/16/21 1445  98  (!) 143/91    12/16/21 1430  (!) 102  (!) 146/101        Intake/Output Summary (Last 24 hours) at 12/17/2021 1419  Last data filed at 12/17/2021 1044  Gross per 24 hour   Intake 1365 ml   Output 250 ml   Net 1115 ml        I had a face to face encounter, and independently examined this patient on 12/17/2021, as outlined below:    PHYSICAL EXAM:  General:    No distress     HEENT: Atraumatic, anicteric sclerae, pink conjunctivae, MMM  Neck:  Supple, symmetrical  Lungs:   CTA. No Wheezing/Rhonchi. No rales. No tenderness. No Accessory muscle use. Heart:   Regular rhythm. No murmur. No JVD. Rt Fem line site intact. Left UE AVF w + thrill   GI/:   Soft. NT. ND. BS normal  Extremities: + edema. No cyanosis. No clubbing. Skin:     Not pale. Not Jaundiced.  No rashes Psych:  Good insight. Not depressed. Not anxious or agitated. Neurologic: Alert and oriented X 4. EOMs intact. No facial asymmetry. No slurred speech. Symmetrical strength, Sensation grossly intact. Labs     I reviewed today's most current labs and imaging studies. Pertinent labs include:  Recent Labs     12/17/21 0438 12/16/21  1310 12/16/21  0322 12/15/21  1646 12/15/21  0423   WBC 7.8  --  8.7  --  9.0   HGB 8.9* 8.5* 8.6*   < > 8.1*   HCT 28.0*  --  27.8*  --  25.2*   PLT 89*  --  84*  --  69*    < > = values in this interval not displayed. Recent Labs     12/17/21 0438 12/16/21  1310 12/16/21  0524 12/15/21  0423 12/15/21  0423   * 136 138   < > 137   K 3.6 4.2 4.0   < > 4.3    104 105   < > 103   CO2 27 21 22   < > 27   GLU 83 94 131*   < > 233*   BUN 47* 77* 70*   < > 40*   CREA 3.70* 5.40* 5.01*   < > 3.50*   CA 8.3* 8.4* 8.4*   < > 8.2*   MG 2.3  --   --   --  2.6*   PHOS  --   --   --   --  3.8   ALB 2.0*  --   --   --  1.8*   TBILI 0.6  --   --   --  0.8   ALT 45  --   --   --  54   INR  --   --   --   --  1.2*    < > = values in this interval not displayed. XR ABD (KUB)    Result Date: 12/10/2021  Femoral catheter as above. XR ABD (KUB)    Result Date: 12/8/2021  Gaseous distention of the large and small bowel. No evidence of free intraperitoneal air on this single view. CT ABD PELV WO CONT    Result Date: 12/8/2021  1. Generalized gaseous distention of large and small bowel. Findings are consistent with a generalized ileus. 2. No evidence of bowel perforation. 3. Continued gastric distention with hemorrhagic material, similar to prior study. 4. NG tube in the stomach. 5. Covid 19 pneumonia has worsened in the visualized lung bases. 6. Continued small to moderate bilateral pleural effusions. 7. Severe diffuse calcific atherosclerosis. 8. Evidence of end-stage renal disease. CT ABD PELV W WO CONT    Result Date: 12/7/2021  1.  There is a large amount of hemorrhage within a distended stomach, extending into the duodenum. An active bleed is not seen on this examination. However, delayed images were not performed, somewhat limiting the study. 2. Bilateral pleural effusions with bilateral pneumonia. XR CHEST PORT    Result Date: 12/9/2021  Unchanged patchy bilateral airspace disease. XR CHEST PORT    Result Date: 12/8/2021  NG tube is coiled in the patient's throat, and terminates in the mid/distal thoracic esophagus. Recommend repositioning. XR CHEST PORT    Result Date: 12/7/2021  No change. XR ABD PORT  1 V    Result Date: 12/7/2021  1. Rotated film. Femoral line overlies the right pelvis     XR ABD PORT  1 V    Result Date: 12/7/2021  Distended stomach, NG tube in place. IR INSERT NON TUNL CVC OVER 5 YRS    Result Date: 12/10/2021  Successful exchange of left femoral Jorge A catheter. The catheter is ready for immediate use. IR INSERT NON TUNL CVC OVER 5 YRS    Result Date: 12/7/2021  1. Successful placement of left common femoral temporary dialysis catheter. The catheter is ready for immediate use. No results found. 11/05/21    ECHO EVER W OR WO CONTRAST 11/11/2021 11/11/2021    Interpretation Summary  · No signs for endocarditis by EVER. · LV: Estimated LVEF is 35 - 40%. Normal cavity size. Mildly increased wall thickness. Moderately reduced systolic function. · MV: Mitral valve leaflet calcification. Mild mitral annular calcification. 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 · TV: Moderate tricuspid valve regurgitation is present. · PA: Moderate pulmonary hypertension.     Signed by: Cosme Mcmanus MD on 11/11/2021 10:42 AM       Current Medications:     Current Facility-Administered Medications:     insulin lispro (HUMALOG) injection, , SubCUTAneous, AC&HS, Raffi Don MD    metoclopramide HCl (REGLAN) injection 5 mg, 5 mg, IntraVENous, Q12H, Raffi Don MD, 5 mg at 12/17/21 0851    oxyCODONE IR (ROXICODONE) tablet 5 mg, 5 mg, Oral, Q4H PRN, Purveyor, Atoosa, ACNP    oxyCODONE IR (ROXICODONE) tablet 10 mg, 10 mg, Oral, Q4H PRN, Purveyor, Atoosa, ACNP, 10 mg at 12/17/21 1310    VANCOMYCIN INFORMATION NOTE, , Other, Rx Dosing/Monitoring, Carlos Jacobs MD    diphenhydrAMINE (BENADRYL) capsule 25 mg, 25 mg, Oral, QHS PRN, Carlos Jacobs MD, 25 mg at 12/15/21 2120    balsam peru-castor oiL (VENELEX) ointment, , Topical, Q12H, Carlos Jacobs MD, Given at 12/17/21 0900    epoetin emilie-epbx (RETACRIT) injection 20,000 Units, 20,000 Units, SubCUTAneous, Q TUE, THU & SAT, Cherelle HERNÁNDEZ MD, 20,000 Units at 12/16/21 2356    glucagon (GLUCAGEN) injection 1 mg, 1 mg, IntraMUSCular, PRN, Ligia Patel MD    dextrose (D50W) injection syrg 12.5-25 g, 12.5-25 g, IntraVENous, PRN, Ligia Patel MD, 25 g at 12/09/21 1719    simethicone (MYLICON) 54DJ/2.9QC oral drops 80 mg, 1.2 mL, Oral, Multiple, Wilfrid Gaitan MD    [Held by provider] calcium acetate(phosphat bind) (PHOSLO) capsule 1,334 mg, 2 Capsule, Per NG tube, TID WITH MEALS, Ligia Patel MD    albuterol-ipratropium (DUO-NEB) 2.5 MG-0.5 MG/3 ML, 3 mL, Nebulization, Q4H PRN, Ligia Patel MD    pantoprazole (PROTONIX) 40 mg in 0.9% sodium chloride 10 mL injection, 40 mg, IntraVENous, Q12H, Leopoldo Darnel, MD, 40 mg at 12/17/21 0846    albumin human 25% (BUMINATE) solution 25 g, 25 g, IntraVENous, DIALYSIS PRN, Leopoldo Darnel, MD, 25 g at 12/07/21 1318    alcohol 62% (NOZIN) nasal  1 Ampule, 1 Ampule, Topical, Q12H, Ligia Patel MD, 1 Ampule at 12/17/21 0900    chlorhexidine (PERIDEX) 0.12 % mouthwash 15 mL, 15 mL, Oral, Q12H, Ligia Patel MD, 15 mL at 12/17/21 0857    acetaminophen (TYLENOL) tablet 650 mg, 650 mg, Oral, Q6H PRN, Juan Haddad MD, 650 mg at 12/16/21 7289    [Held by provider] L.acidophilus-paracasei-S.thermophil-bifidobacter (RISAQUAD) 8 billion cell capsule, 1 Capsule, Oral, DAILY, Ced CAMPOVERDE MD, 1 Capsule at 12/05/21 0909    sodium chloride (NS) flush 5-40 mL, 5-40 mL, IntraVENous, Q8H, Shweta Grimm MD, 10 mL at 12/17/21 0600    sodium chloride (NS) flush 5-40 mL, 5-40 mL, IntraVENous, PRN, Shweta Sanches MD, 10 mL at 12/16/21 0815    polyethylene glycol (MIRALAX) packet 17 g, 17 g, Oral, DAILY PRN, Joann Coffman MD    [DISCONTINUED] ondansetron (ZOFRAN ODT) tablet 4 mg, 4 mg, Oral, Q8H PRN **OR** ondansetron (ZOFRAN) injection 4 mg, 4 mg, IntraVENous, Q6H PRN, Ced CAMPOVERDE MD, 4 mg at 12/06/21 3946     Procedures: see electronic medical records for all procedures/Xrays and details which were not copied into this note but were reviewed prior to creation of Plan. Reviewed most current lab test results and cultures  YES  Reviewed most current radiology test results   YES  Review and summation of old records today    NO  Reviewed patient's current orders and MAR    YES  PMH/ reviewed - no change compared to H&P  ________________________________________________________________________  Care Plan discussed with:    Comments   Patient x    Family      RN x    Care Manager     Consultant                        Multidiciplinary team rounds were held today with , nursing, pharmacist and clinical coordinator. Patient's plan of care was discussed; medications were reviewed and discharge planning was addressed.      ________________________________________________________________________  Total NON critical care TIME: 35    Minutes    Total CRITICAL CARE TIME Spent:   Minutes non procedure based      Comments   >50% of visit spent in counseling and coordination of care x     This includes time during multidisciplinary rounds if indicated above   ________________________________________________________________________  Micha Rodriguez MD

## 2021-12-18 LAB
ALBUMIN SERPL-MCNC: 2 G/DL (ref 3.5–5)
ALBUMIN/GLOB SERPL: 0.5 {RATIO} (ref 1.1–2.2)
ALP SERPL-CCNC: 148 U/L (ref 45–117)
ALT SERPL-CCNC: 40 U/L (ref 12–78)
ANION GAP SERPL CALC-SCNC: 10 MMOL/L (ref 5–15)
AST SERPL-CCNC: 26 U/L (ref 15–37)
ATRIAL RATE: 90 BPM
BASOPHILS # BLD: 0.1 K/UL (ref 0–0.1)
BASOPHILS NFR BLD: 1 % (ref 0–1)
BILIRUB SERPL-MCNC: 0.7 MG/DL (ref 0.2–1)
BUN SERPL-MCNC: 65 MG/DL (ref 6–20)
BUN/CREAT SERPL: 12 (ref 12–20)
CALCIUM SERPL-MCNC: 8.4 MG/DL (ref 8.5–10.1)
CALCULATED P AXIS, ECG09: 117 DEGREES
CALCULATED R AXIS, ECG10: 107 DEGREES
CALCULATED T AXIS, ECG11: 55 DEGREES
CHLORIDE SERPL-SCNC: 100 MMOL/L (ref 97–108)
CO2 SERPL-SCNC: 23 MMOL/L (ref 21–32)
CREAT SERPL-MCNC: 5.37 MG/DL (ref 0.7–1.3)
DIAGNOSIS, 93000: NORMAL
DIFFERENTIAL METHOD BLD: ABNORMAL
EOSINOPHIL # BLD: 0 K/UL (ref 0–0.4)
EOSINOPHIL NFR BLD: 0 % (ref 0–7)
ERYTHROCYTE [DISTWIDTH] IN BLOOD BY AUTOMATED COUNT: 18.8 % (ref 11.5–14.5)
GLOBULIN SER CALC-MCNC: 4.1 G/DL (ref 2–4)
GLUCOSE BLD STRIP.AUTO-MCNC: 116 MG/DL (ref 65–117)
GLUCOSE BLD STRIP.AUTO-MCNC: 67 MG/DL (ref 65–117)
GLUCOSE BLD STRIP.AUTO-MCNC: 82 MG/DL (ref 65–117)
GLUCOSE BLD STRIP.AUTO-MCNC: 89 MG/DL (ref 65–117)
GLUCOSE SERPL-MCNC: 80 MG/DL (ref 65–100)
HCT VFR BLD AUTO: 27.7 % (ref 36.6–50.3)
HGB BLD-MCNC: 9 G/DL (ref 12.1–17)
IMM GRANULOCYTES # BLD AUTO: 0 K/UL (ref 0–0.04)
IMM GRANULOCYTES NFR BLD AUTO: 0 % (ref 0–0.5)
LYMPHOCYTES # BLD: 1.1 K/UL (ref 0.8–3.5)
LYMPHOCYTES NFR BLD: 14 % (ref 12–49)
MAGNESIUM SERPL-MCNC: 2.6 MG/DL (ref 1.6–2.4)
MCH RBC QN AUTO: 29.6 PG (ref 26–34)
MCHC RBC AUTO-ENTMCNC: 32.5 G/DL (ref 30–36.5)
MCV RBC AUTO: 91.1 FL (ref 80–99)
MONOCYTES # BLD: 0.9 K/UL (ref 0–1)
MONOCYTES NFR BLD: 11 % (ref 5–13)
NEUTS SEG # BLD: 6.2 K/UL (ref 1.8–8)
NEUTS SEG NFR BLD: 74 % (ref 32–75)
NRBC # BLD: 0.03 K/UL (ref 0–0.01)
NRBC BLD-RTO: 0.4 PER 100 WBC
P-R INTERVAL, ECG05: 168 MS
PLATELET # BLD AUTO: 114 K/UL (ref 150–400)
PMV BLD AUTO: 12.6 FL (ref 8.9–12.9)
POTASSIUM SERPL-SCNC: 4.4 MMOL/L (ref 3.5–5.1)
PROT SERPL-MCNC: 6.1 G/DL (ref 6.4–8.2)
Q-T INTERVAL, ECG07: 412 MS
QRS DURATION, ECG06: 92 MS
QTC CALCULATION (BEZET), ECG08: 504 MS
RBC # BLD AUTO: 3.04 M/UL (ref 4.1–5.7)
SERVICE CMNT-IMP: NORMAL
SODIUM SERPL-SCNC: 133 MMOL/L (ref 136–145)
VENTRICULAR RATE, ECG03: 90 BPM
WBC # BLD AUTO: 8.3 K/UL (ref 4.1–11.1)

## 2021-12-18 PROCEDURE — 36415 COLL VENOUS BLD VENIPUNCTURE: CPT

## 2021-12-18 PROCEDURE — 85025 COMPLETE CBC W/AUTO DIFF WBC: CPT

## 2021-12-18 PROCEDURE — 74011000250 HC RX REV CODE- 250: Performed by: INTERNAL MEDICINE

## 2021-12-18 PROCEDURE — 65660000000 HC RM CCU STEPDOWN

## 2021-12-18 PROCEDURE — 82962 GLUCOSE BLOOD TEST: CPT

## 2021-12-18 PROCEDURE — 74011250637 HC RX REV CODE- 250/637: Performed by: EMERGENCY MEDICINE

## 2021-12-18 PROCEDURE — 74011250637 HC RX REV CODE- 250/637: Performed by: GENERAL ACUTE CARE HOSPITAL

## 2021-12-18 PROCEDURE — 90935 HEMODIALYSIS ONE EVALUATION: CPT

## 2021-12-18 PROCEDURE — 74011250637 HC RX REV CODE- 250/637: Performed by: INTERNAL MEDICINE

## 2021-12-18 PROCEDURE — 74011250636 HC RX REV CODE- 250/636: Performed by: INTERNAL MEDICINE

## 2021-12-18 PROCEDURE — 93005 ELECTROCARDIOGRAM TRACING: CPT

## 2021-12-18 PROCEDURE — 83735 ASSAY OF MAGNESIUM: CPT

## 2021-12-18 PROCEDURE — 80053 COMPREHEN METABOLIC PANEL: CPT

## 2021-12-18 PROCEDURE — 74011250637 HC RX REV CODE- 250/637: Performed by: NURSE PRACTITIONER

## 2021-12-18 PROCEDURE — 2709999900 HC NON-CHARGEABLE SUPPLY

## 2021-12-18 RX ORDER — METOCLOPRAMIDE 5 MG/1
5 TABLET ORAL 2 TIMES DAILY
Qty: 60 TABLET | Refills: 0 | Status: SHIPPED | OUTPATIENT
Start: 2021-12-18 | End: 2022-01-17

## 2021-12-18 RX ORDER — PANTOPRAZOLE SODIUM 40 MG/1
40 TABLET, DELAYED RELEASE ORAL
Qty: 60 TABLET | Refills: 1 | Status: SHIPPED | OUTPATIENT
Start: 2021-12-18 | End: 2022-01-17

## 2021-12-18 RX ORDER — OXYCODONE HYDROCHLORIDE 5 MG/1
5 TABLET ORAL
Qty: 12 TABLET | Refills: 0 | Status: SHIPPED | OUTPATIENT
Start: 2021-12-18 | End: 2021-12-20

## 2021-12-18 RX ADMIN — CHLORHEXIDINE GLUCONATE 15 ML: 1.2 RINSE ORAL at 21:35

## 2021-12-18 RX ADMIN — PANTOPRAZOLE SODIUM 40 MG: 40 TABLET, DELAYED RELEASE ORAL at 16:18

## 2021-12-18 RX ADMIN — CALCIUM CARBONATE (ANTACID) CHEW TAB 500 MG 400 MG: 500 CHEW TAB at 13:33

## 2021-12-18 RX ADMIN — CALCIUM CARBONATE (ANTACID) CHEW TAB 500 MG 400 MG: 500 CHEW TAB at 08:27

## 2021-12-18 RX ADMIN — Medication: at 21:36

## 2021-12-18 RX ADMIN — OXYCODONE 10 MG: 5 TABLET ORAL at 16:19

## 2021-12-18 RX ADMIN — CALCIUM CARBONATE (ANTACID) CHEW TAB 500 MG 400 MG: 500 CHEW TAB at 21:34

## 2021-12-18 RX ADMIN — METOCLOPRAMIDE 5 MG: 10 TABLET ORAL at 16:18

## 2021-12-18 RX ADMIN — EPOETIN ALFA-EPBX 20000 UNITS: 20000 INJECTION, SOLUTION INTRAVENOUS; SUBCUTANEOUS at 22:00

## 2021-12-18 RX ADMIN — Medication 1 AMPULE: at 22:00

## 2021-12-18 RX ADMIN — PANTOPRAZOLE SODIUM 40 MG: 40 TABLET, DELAYED RELEASE ORAL at 08:29

## 2021-12-18 RX ADMIN — ACETAMINOPHEN 650 MG: 325 TABLET ORAL at 03:43

## 2021-12-18 RX ADMIN — OXYCODONE 10 MG: 5 TABLET ORAL at 23:14

## 2021-12-18 RX ADMIN — CHLORHEXIDINE GLUCONATE 15 ML: 1.2 RINSE ORAL at 08:31

## 2021-12-18 RX ADMIN — OXYCODONE 10 MG: 5 TABLET ORAL at 09:33

## 2021-12-18 RX ADMIN — Medication: at 08:30

## 2021-12-18 RX ADMIN — CALCIUM CARBONATE (ANTACID) CHEW TAB 500 MG 400 MG: 500 CHEW TAB at 02:44

## 2021-12-18 RX ADMIN — Medication 1 AMPULE: at 08:30

## 2021-12-18 RX ADMIN — METOCLOPRAMIDE 5 MG: 10 TABLET ORAL at 08:29

## 2021-12-18 RX ADMIN — OXYCODONE 10 MG: 5 TABLET ORAL at 02:30

## 2021-12-18 NOTE — PROGRESS NOTES
Problem: Airway Clearance - Ineffective  Goal: Achieve or maintain patent airway  Outcome: Progressing Towards Goal     Problem: Gas Exchange - Impaired  Goal: Absence of hypoxia  Outcome: Progressing Towards Goal     Problem: Isolation Precautions - Risk of Spread of Infection  Goal: Prevent transmission of infectious organism to others  Outcome: Progressing Towards Goal     Problem: Falls - Risk of  Goal: *Absence of Falls  Description: Document [place] Fall Risk and appropriate interventions in the flowsheet.   Outcome: Progressing Towards Goal  Note: Fall Risk Interventions:  Mobility Interventions: Assess mobility with egress test,Bed/chair exit alarm    Mentation Interventions: Adequate sleep, hydration, pain control    Medication Interventions: Assess postural VS orthostatic hypotension,Bed/chair exit alarm    Elimination Interventions: Bed/chair exit alarm,Call light in reach    History of Falls Interventions: Bed/chair exit alarm,Consult care management for discharge planning

## 2021-12-18 NOTE — PROGRESS NOTES
Hospitalist Progress Note    NAME: Breanna Silvestre   :  1975   MRN:  [id number]       Assessment / Plan:    Hemorrhagic shock  Upper GI bleed  Ileus   Protein calorie malnutrition   transaminitis   EGD: distal esophageal ulcer with large clots in stomach  Repeat EGD: distal esophageal ulcer with blood in stomach  Hb stable, s/p transfusion of: 11 units PRBC, 3 units Plasma, 2 Cryoprecipitate, 3 units Plts  GI hold off doing a 3rd EGD  Gen Cheko not need for surgical intervention now  PPI BID  Reglan   Holding Coumadin   Improving oral intake   DC TPN, may remore Femoral line if able to get PIV  Epogen     B/l pleural effusion  Covid Pna  B/l Pna  Sepsis   Recent Streptococcus intermedius bacteremia (2021)  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 Code status: Full  Prophylaxis: SCD's  Recommended Disposition: SAH/Rehab  Anticipated Discharge Date:  Stable for DC, does not want to continue with home HD, was to have HD at outpt center for short period of time after DC        Subjective:     Discussed with RN events overnight.    No complaints   Better appetite   Afebrile  Denies CP and SOB  No melena/bloody BM  Leg swelling     Review of Systems:  Symptom Y/N Comments  Symptom Y/N Comments   Fever/Chills    Chest Pain     Poor Appetite    Edema     Cough Abdominal Pain     Sputum    Joint Pain     SOB/ZAMUDIO    Pruritis/Rash     Nausea/vomit    Tolerating PT/OT     Diarrhea    Tolerating Diet     Constipation    Other       PO intake: No data found. Wt Readings from Last 10 Encounters:   12/18/21 63.4 kg (139 lb 12.8 oz)   11/17/21 66.1 kg (145 lb 12.8 oz)   04/07/21 66.8 kg (147 lb 4.3 oz)   03/27/21 67.4 kg (148 lb 9.4 oz)   11/08/20 68.6 kg (151 lb 4.8 oz)   11/07/20 67.1 kg (148 lb)   10/20/20 67.5 kg (148 lb 13 oz)   09/13/20 70 kg (154 lb 5.2 oz)   09/10/20 70 kg (154 lb 5.2 oz)   09/08/20 66.7 kg (147 lb 0.8 oz)       Objective:     VITALS:   Last 24hrs VS reviewed since prior progress note. Most recent are:  Patient Vitals for the past 24 hrs:   Temp Pulse Resp BP SpO2   12/18/21 0753 98.5 °F (36.9 °C) 91 24 (!) 130/97 97 %   12/18/21 0400 99.5 °F (37.5 °C) 95 (!) 34 114/78 95 %   12/17/21 2316 99.5 °F (37.5 °C) 100 20 113/75    12/17/21 2000  95  109/78 100 %   12/17/21 1915 99.3 °F (37.4 °C) 95  112/78 100 %   12/17/21 1813  93  113/75 99 %   12/17/21 1630 98.6 °F (37 °C) (!) 101 24 116/72 98 %   12/17/21 1116 98.9 °F (37.2 °C) 99 19 96/65 98 %       Intake/Output Summary (Last 24 hours) at 12/18/2021 0946  Last data filed at 12/17/2021 1044  Gross per 24 hour   Intake    Output 250 ml   Net -250 ml        I had a face to face encounter, and independently examined this patient on 12/18/2021, as outlined below:    PHYSICAL EXAM:  General:    No distress     HEENT: Atraumatic, anicteric sclerae, pink conjunctivae, MMM  Neck:  Supple, symmetrical  Lungs:   CTA. No Wheezing/Rhonchi. No rales. No tenderness. No Accessory muscle use. Heart:   Regular rhythm. No murmur. No JVD. Left UE AVF w + thrill   GI/:   Soft. NT. ND. BS normal  Extremities: + edema. No cyanosis. No clubbing. Skin:     Not pale. Not Jaundiced. No rashes   Psych:  Good insight. Not depressed. Not anxious or agitated. Neurologic: Alert and oriented X 4. EOMs intact.  No facial asymmetry. No slurred speech. Symmetrical strength, Sensation grossly intact. Labs     I reviewed today's most current labs and imaging studies. Pertinent labs include:  Recent Labs     12/18/21  0650 12/17/21  0438 12/16/21  1310 12/16/21  0322 12/16/21  0322   WBC 8.3 7.8  --   --  8.7   HGB 9.0* 8.9* 8.5*   < > 8.6*   HCT 27.7* 28.0*  --   --  27.8*   * 89*  --   --  84*    < > = values in this interval not displayed. Recent Labs     12/18/21  0650 12/17/21  0438 12/16/21  1310   * 135* 136   K 4.4 3.6 4.2    101 104   CO2 23 27 21   GLU 80 83 94   BUN 65* 47* 77*   CREA 5.37* 3.70* 5.40*   CA 8.4* 8.3* 8.4*   MG 2.6* 2.3  --    ALB 2.0* 2.0*  --    TBILI 0.7 0.6  --    ALT 40 45  --      XR ABD (KUB)    Result Date: 12/10/2021  Femoral catheter as above. XR ABD (KUB)    Result Date: 12/8/2021  Gaseous distention of the large and small bowel. No evidence of free intraperitoneal air on this single view. CT ABD PELV WO CONT    Result Date: 12/8/2021  1. Generalized gaseous distention of large and small bowel. Findings are consistent with a generalized ileus. 2. No evidence of bowel perforation. 3. Continued gastric distention with hemorrhagic material, similar to prior study. 4. NG tube in the stomach. 5. Covid 19 pneumonia has worsened in the visualized lung bases. 6. Continued small to moderate bilateral pleural effusions. 7. Severe diffuse calcific atherosclerosis. 8. Evidence of end-stage renal disease. CT ABD PELV W WO CONT    Result Date: 12/7/2021  1. There is a large amount of hemorrhage within a distended stomach, extending into the duodenum. An active bleed is not seen on this examination. However, delayed images were not performed, somewhat limiting the study. 2. Bilateral pleural effusions with bilateral pneumonia. XR CHEST PORT    Result Date: 12/9/2021  Unchanged patchy bilateral airspace disease.     XR CHEST PORT    Result Date: 12/8/2021  NG tube is coiled in the patient's throat, and terminates in the mid/distal thoracic esophagus. Recommend repositioning. XR CHEST PORT    Result Date: 12/7/2021  No change. XR ABD PORT  1 V    Result Date: 12/7/2021  1. Rotated film. Femoral line overlies the right pelvis     XR ABD PORT  1 V    Result Date: 12/7/2021  Distended stomach, NG tube in place. IR INSERT NON TUNL CVC OVER 5 YRS    Result Date: 12/10/2021  Successful exchange of left femoral Jorge A catheter. The catheter is ready for immediate use. IR INSERT NON TUNL CVC OVER 5 YRS    Result Date: 12/7/2021  1. Successful placement of left common femoral temporary dialysis catheter. The catheter is ready for immediate use. No results found. 11/05/21    ECHO EVER W OR WO CONTRAST 11/11/2021 11/11/2021    Interpretation Summary  · No signs for endocarditis by EVER. · LV: Estimated LVEF is 35 - 40%. Normal cavity size. Mildly increased wall thickness. Moderately reduced systolic function. · MV: Mitral valve leaflet calcification. Mild mitral annular calcification. 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 · TV: Moderate tricuspid valve regurgitation is present. · PA: Moderate pulmonary hypertension.     Signed by: Sharon Gtz MD on 11/11/2021 10:42 AM       Current Medications:     Current Facility-Administered Medications:     metoclopramide HCl (REGLAN) tablet 5 mg, 5 mg, Oral, BID, Atif Don MD, 5 mg at 12/18/21 0829    pantoprazole (PROTONIX) tablet 40 mg, 40 mg, Oral, ACB&D, Atif Don MD, 40 mg at 12/18/21 9684    calcium carbonate (TUMS) chewable tablet 400 mg [elemental], 400 mg, Oral, QID PRN, Vamshi Chandra, PHANIP, 400 mg at 12/18/21 0827    insulin lispro (HUMALOG) injection, , SubCUTAneous, AC&HS, Lulú Don MD    oxyCODONE IR (ROXICODONE) tablet 5 mg, 5 mg, Oral, Q4H PRN, Vamshi Chandra, ACNP    oxyCODONE IR (ROXICODONE) tablet 10 mg, 10 mg, Oral, Q4H PRN, Vamshi Chandra, ACNP, 10 mg at 12/18/21 0933    VANCOMYCIN INFORMATION NOTE, , Other, Rx Dosing/Monitoring, Darin Foreman MD    diphenhydrAMINE (BENADRYL) capsule 25 mg, 25 mg, Oral, QHS PRN, Darin Foreman MD, 25 mg at 12/15/21 2120    balsam peru-castor oiL (VENELEX) ointment, , Topical, Q12H, Darin Foreman MD, Given at 12/18/21 0830    epoetin emilie-epbx (RETACRIT) injection 20,000 Units, 20,000 Units, SubCUTAneous, Q TUE, THU & SAT, Kelley Piedra MD, 20,000 Units at 12/16/21 2356    glucagon (GLUCAGEN) injection 1 mg, 1 mg, IntraMUSCular, PRN, Raisa Kirkland MD    dextrose (D50W) injection syrg 12.5-25 g, 12.5-25 g, IntraVENous, PRN, Raisa Kirkland MD, 25 g at 12/09/21 1719    simethicone (MYLICON) 88PW/4.1HU oral drops 80 mg, 1.2 mL, Oral, Multiple, Zehra Gaitan MD    [Held by provider] calcium acetate(phosphat bind) (PHOSLO) capsule 1,334 mg, 2 Capsule, Per NG tube, TID WITH MEALS, Raisa Kirkland MD    albuterol-ipratropium (DUO-NEB) 2.5 MG-0.5 MG/3 ML, 3 mL, Nebulization, Q4H PRN, Raisa Kirkland MD    albumin human 25% (BUMINATE) solution 25 g, 25 g, IntraVENous, DIALYSIS PRN, Debra Alvarado MD, 25 g at 12/07/21 1318    alcohol 62% (NOZIN) nasal  1 Ampule, 1 Ampule, Topical, Q12H, Raisa Kirkland MD, 1 Ampule at 12/18/21 0830    chlorhexidine (PERIDEX) 0.12 % mouthwash 15 mL, 15 mL, Oral, Q12H, Raisa Kirkland MD, 15 mL at 12/18/21 0831    acetaminophen (TYLENOL) tablet 650 mg, 650 mg, Oral, Q6H PRN, Vero Shelby MD, 650 mg at 12/18/21 0343    [Held by provider] L.acidophilus-paracasei-S.thermophil-bifidobacter (RISAQUAD) 8 billion cell capsule, 1 Capsule, Oral, DAILY, Shweta Grimm MD, 1 Capsule at 12/05/21 0909    sodium chloride (NS) flush 5-40 mL, 5-40 mL, IntraVENous, Q8H, Shweta Grimm MD, 10 mL at 12/17/21 0600    sodium chloride (NS) flush 5-40 mL, 5-40 mL, IntraVENous, PRN, Mliss Shweta Dunbar MD, 10 mL at 12/16/21 0815    polyethylene glycol (MIRALAX) packet 17 g, 17 g, Oral, DAILY PRN, Nader Jamil MD    [DISCONTINUED] ondansetron (ZOFRAN ODT) tablet 4 mg, 4 mg, Oral, Q8H PRN **OR** ondansetron (ZOFRAN) injection 4 mg, 4 mg, IntraVENous, Q6H PRN, Corrina CAMPOVERDE MD, 4 mg at 12/06/21 3053     Procedures: see electronic medical records for all procedures/Xrays and details which were not copied into this note but were reviewed prior to creation of Plan. Reviewed most current lab test results and cultures  YES  Reviewed most current radiology test results   YES  Review and summation of old records today    NO  Reviewed patient's current orders and MAR    YES  PMH/ reviewed - no change compared to H&P  ________________________________________________________________________  Care Plan discussed with:    Comments   Patient x    Family      RN x    Care Manager     Consultant                        Multidiciplinary team rounds were held today with , nursing, pharmacist and clinical coordinator. Patient's plan of care was discussed; medications were reviewed and discharge planning was addressed.      ________________________________________________________________________  Total NON critical care TIME: 35    Minutes    Total CRITICAL CARE TIME Spent:   Minutes non procedure based      Comments   >50% of visit spent in counseling and coordination of care x     This includes time during multidisciplinary rounds if indicated above   ________________________________________________________________________  Angela Randolph MD

## 2021-12-18 NOTE — DISCHARGE INSTRUCTIONS
Patient Education        Gastrointestinal Bleeding: Care Instructions  Your Care Instructions     The digestive or gastrointestinal tract goes from the mouth to the anus. It is often called the GI tract. Bleeding can happen anywhere in the GI tract. It may be caused by an ulcer, an infection, or cancer. It may also be caused by medicines such as aspirin or ibuprofen. Light bleeding may not cause any symptoms at first. But if you continue to bleed for a while, you may feel very weak or tired. Sudden, heavy bleeding means you need to see a doctor right away. This kind of bleeding can be very dangerous. But it can usually be cured or controlled. The doctor may do some tests to find the cause of your bleeding. Follow-up care is a key part of your treatment and safety. Be sure to make and go to all appointments, and call your doctor if you are having problems. It's also a good idea to know your test results and keep a list of the medicines you take. How can you care for yourself at home? · Be safe with medicines. Take your medicines exactly as prescribed. Call your doctor if you think you are having a problem with your medicine. You will get more details on the specific medicines your doctor prescribes. · Do not take aspirin or other anti-inflammatory medicines, such as naproxen (Aleve) or ibuprofen (Advil, Motrin), without talking to your doctor first. Ask your doctor if it is okay to use acetaminophen (Tylenol). · Do not drink alcohol. · The bleeding may make you lose iron. So it's important to eat foods that have a lot of iron. These include red meat, shellfish, poultry, and eggs. They also include beans, raisins, whole-grain breads, and leafy green vegetables. If you want help planning meals, you can make an appointment with a dietitian. When should you call for help? Call 911 anytime you think you may need emergency care.  For example, call if:    · You have sudden, severe belly pain.     · You vomit blood or what looks like coffee grounds.     · You passed out (lost consciousness).     · Your stools are maroon or very bloody. Call your doctor now or seek immediate medical care if:    · You are dizzy or lightheaded, or you feel like you may faint.     · Your stools are black and look like tar, or they have streaks of blood.     · You have belly pain.     · You vomit or have nausea.     · You have trouble swallowing, or it hurts when you swallow. Watch closely for changes in your health, and be sure to contact your doctor if:    · You do not get better as expected. Where can you learn more? Go to http://www.gray.com/  Enter F981 in the search box to learn more about \"Gastrointestinal Bleeding: Care Instructions. \"  Current as of: July 1, 2021               Content Version: 13.0  © 3717-8052 [employer]. Care instructions adapted under license by [employer] (which disclaims liability or warranty for this information). If you have questions about a medical condition or this instruction, always ask your healthcare professional. [address] any warranty or liability for your use of this information.

## 2021-12-18 NOTE — PROGRESS NOTES
1900 Verbal shift change report given to Bailey Monsalve RN (oncoming nurse) by Rodolfo Thomas (offgoing nurse). Report included the following information SBAR, Kardex, ED Summary, Procedure Summary, MAR and Cardiac Rhythm Sinus rhythm. 2153 Patient reports pain 7/10 admin Roxicodone 10 mg for pain. 0230 Patient reports pain 6/10, admin Roxicodone 10 mg for patient discomfort.     0409 Patient had 2 seconds of v-tach, EKG performed, patient asymptomatic, NP Purveyor aware placed orders for labs. End of Shift Note    0700 Bedside shift change report given to MANUELITO Cannon  (oncoming nurse) by Bailey Monsalve RN (offgoing nurse). Report included the following information SBAR, ED Summary, OR Summary, Intake/Output, MAR and Cardiac Rhythm SR    Shift worked:  9740-7824     Shift summary and any significant changes:     none     Concerns for physician to address:  runs of v-Vericant   Zone phone for oncoming shift:          Activity:  Activity Level: Up with Assistance  Number times ambulated in hallways past shift: 0  Number of times OOB to chair past shift: 2    Cardiac:   Cardiac Monitoring: Yes      Cardiac Rhythm: Sinus Rhythm    Access:   Current line(s): no access     Genitourinary:   Urinary status: anuric    Respiratory:   O2 Device: None (Room air)  Chronic home O2 use?: NO  Incentive spirometer at bedside: NO     GI:  Last Bowel Movement Date: 12/18/21  Current diet:  ADULT DIET Regular; [facility] [place] (GERD/Peptic Ulcer)  ADULT ORAL NUTRITION SUPPLEMENT Breakfast; Clear Liquid  ADULT ORAL NUTRITION SUPPLEMENT Lunch, Dinner; Renal Supplement  Passing flatus: YES  Tolerating current diet: YES       Pain Management:   Patient states pain is manageable on current regimen: YES    Skin:  Yong Score: 18  Interventions: float heels and increase time out of bed    Patient Safety:  Fall Score:  Total Score: 1  Interventions: bed/chair alarm and gripper socks  High Fall Risk: Yes    Length of Stay:  Expected LOS: 4d 21h  Actual LOS: 21      Joe Jefferson RN

## 2021-12-18 NOTE — PROGRESS NOTES
Pt discharge home ordere discontinued by DR Se Auguste telephone order. Pt stated he is not comfortable to go home doing dialyses. Arguments can't  made till Monday.

## 2021-12-18 NOTE — PROGRESS NOTES
End of Shift Note    Bedside shift change report given to [address] [place] [address] (oncoming nurse) by Osvaldo Mcdaniels RN (offgoing nurse). Report included the following information SBAR    Shift worked:  7am-7pm     Shift summary and any significant changes:     discharge held to till monday     Concerns for physician to address: None      Zone phone for oncoming shift:        Activity:  Activity Level: Up with Assistance  Number times ambulated in hallways past shift: 0  Number of times OOB to chair past shift: 1    Cardiac:   Cardiac Monitoring: Yes      Cardiac Rhythm: Sinus Tachy    Access:   Current line(s): HD access     Genitourinary:   Urinary status: anuric    Respiratory:   O2 Device: Nasal cannula  Chronic home O2 use?: NO  Incentive spirometer at bedside: NO     GI:  Last Bowel Movement Date: 12/18/21  Current diet:  ADULT DIET Regular; GI [place] (GERD/Peptic Ulcer)  ADULT ORAL NUTRITION SUPPLEMENT Breakfast; Clear Liquid  ADULT ORAL NUTRITION SUPPLEMENT Lunch, Dinner; Renal Supplement  Passing flatus: YES  Tolerating current diet: YES       Pain Management:   Patient states pain is manageable on current regimen: YES    Skin:  Yong Score: 18  Interventions: float heels    Patient Safety:  Fall Score:  Total Score: 3  Interventions: bed/chair alarm and gripper socks  High Fall Risk: Yes    Length of Stay:  Expected LOS: 4d 21h  Actual LOS: 21      Ti D MANUELITO Zhou

## 2021-12-18 NOTE — DIALYSIS
Hemodialysis / [phone]    Vitals Pre Post Assessment Pre Post   BP BP: 118/85 (12/18/21 1236) 151/99 LOC Alert and oriented *4 No change   HR Pulse (Heart Rate): 85 (12/18/21 1236) 95 Lungs clear No change   Resp Resp Rate: 21 (12/18/21 1236) 21 Cardiac regular NO change   Temp Temp: 98.9 °F (37.2 °C) (12/18/21 1142) 98.8 Skin Warm dry and intact No change   Weight Pre-Dialysis Weight: 66.7 kg (147 lb) (12/02/21 0743)  Edema In low ext and facial No change   Tele status   Pain Pain Intensity 1: 0 (12/18/21 1237)      Orders   Duration: Start: 1230 End: 1600 Total: 3.5 hours   Dialyzer: Dialyzer/Set Up Inspection: Revaclear (12/18/21 1236)   K Bath: Dialysate K (mEq/L): 3 (12/18/21 1236)   Ca Bath: Dialysate CA (mEq/L): 2.5 (12/18/21 1236)   Na: Dialysate NA (mEq/L): 140 (12/18/21 1236)   Bicarb: Dialysate HCO3 (mEq/L): 35 (12/18/21 1236)   Target Fluid Removal: Goal/Amount of Fluid to Remove (mL): 2000 mL (12/18/21 1236)     Access   Type & Location: SUZANNE AVG: skin CDI. No s/s of infection. + B/T. No issues with cannulation or hemostasis. Running well at .         Comments:                                        Labs   HBsAg (Antigen) / date:   Negative as of    12/1/21                                         HBsAb (Antibody) / date: Immune as of 12/1/21   Source:    Obtained/Reviewed  Critical Results Called HGB   Date Value Ref Range Status   12/18/2021 9.0 (L) 12.1 - 17.0 g/dL Final     Potassium   Date Value Ref Range Status   12/18/2021 4.4 3.5 - 5.1 mmol/L Final     Calcium   Date Value Ref Range Status   12/18/2021 8.4 (L) 8.5 - 10.1 MG/DL Final     BUN   Date Value Ref Range Status   12/18/2021 65 (H) 6 - 20 MG/DL Final     Creatinine   Date Value Ref Range Status   12/18/2021 5.37 (H) 0.70 - 1.30 MG/DL Final     Comment:     INVESTIGATED PER DELTA CHECK PROTOCOL        Meds Given   Name Dose Route                    Adequacy / Fluid    Total Liters Process: 79.6   Net Fluid Removed: 2500 ml Comments   Time Out Done:   (9794)    Admitting Diagnosis: COVID   Consent obtained/signed: Informed Consent Verified: Yes (12/18/21 1236)   Machine / RO # Machine Number: Savanna Junior (12/18/21 1236)   Primary Nurse Rpt Pre: Ti D Mihradithya   Primary Nurse Rpt Post: Ti D Mihrete   Pt Education: yes   Care Plan: Continue hd   Pts outpatient clinic:      Tx Summary   Consent signed & on file. SBAR received from Primary RN. 1230: Pt cannulated with 77U needles per policy & without issue. Labs drawn per request/ order. VSS. Dialysis Tx initiated. 1330: Pt resting quietly. 1600: Tx ended. VSS. All possible blood returned to patient. Hemostasis achieved without issue. Bed locked and in the lowest position, call bell and belongings in reach. SBAR given to Primary, RN. Patient is stable at time of their/ my departure. All Dialysis related medications have been reviewed.       Comments:

## 2021-12-19 ENCOUNTER — ANESTHESIA EVENT (OUTPATIENT)
Dept: SURGERY | Age: 46
DRG: 207 | End: 2021-12-19
Payer: MEDICARE

## 2021-12-19 ENCOUNTER — APPOINTMENT (OUTPATIENT)
Dept: CT IMAGING | Age: 46
DRG: 207 | End: 2021-12-19
Attending: GENERAL ACUTE CARE HOSPITAL
Payer: MEDICARE

## 2021-12-19 ENCOUNTER — APPOINTMENT (OUTPATIENT)
Dept: GENERAL RADIOLOGY | Age: 46
DRG: 207 | End: 2021-12-19
Attending: GENERAL ACUTE CARE HOSPITAL
Payer: MEDICARE

## 2021-12-19 ENCOUNTER — ANESTHESIA (OUTPATIENT)
Dept: SURGERY | Age: 46
DRG: 207 | End: 2021-12-19
Payer: MEDICARE

## 2021-12-19 LAB
ANION GAP SERPL CALC-SCNC: 7 MMOL/L (ref 5–15)
BUN SERPL-MCNC: 44 MG/DL (ref 6–20)
BUN/CREAT SERPL: 9 (ref 12–20)
CALCIUM SERPL-MCNC: 7.9 MG/DL (ref 8.5–10.1)
CHLORIDE SERPL-SCNC: 104 MMOL/L (ref 97–108)
CO2 SERPL-SCNC: 28 MMOL/L (ref 21–32)
CREAT SERPL-MCNC: 4.66 MG/DL (ref 0.7–1.3)
ERYTHROCYTE [DISTWIDTH] IN BLOOD BY AUTOMATED COUNT: 19.3 % (ref 11.5–14.5)
GLUCOSE BLD STRIP.AUTO-MCNC: 88 MG/DL (ref 65–117)
GLUCOSE BLD STRIP.AUTO-MCNC: 89 MG/DL (ref 65–117)
GLUCOSE BLD STRIP.AUTO-MCNC: 90 MG/DL (ref 65–117)
GLUCOSE BLD STRIP.AUTO-MCNC: 92 MG/DL (ref 65–117)
GLUCOSE SERPL-MCNC: 91 MG/DL (ref 65–100)
HCT VFR BLD AUTO: 20.9 % (ref 36.6–50.3)
HGB BLD-MCNC: 6.7 G/DL (ref 12.1–17)
HISTORY CHECKED?,CKHIST: NORMAL
INR PPP: 1.2 (ref 0.9–1.1)
MCH RBC QN AUTO: 29.9 PG (ref 26–34)
MCHC RBC AUTO-ENTMCNC: 32.1 G/DL (ref 30–36.5)
MCV RBC AUTO: 93.3 FL (ref 80–99)
NRBC # BLD: 0.05 K/UL (ref 0–0.01)
NRBC BLD-RTO: 0.4 PER 100 WBC
PLATELET # BLD AUTO: 124 K/UL (ref 150–400)
PMV BLD AUTO: 12.5 FL (ref 8.9–12.9)
POTASSIUM SERPL-SCNC: 5 MMOL/L (ref 3.5–5.1)
PROTHROMBIN TIME: 12.3 SEC (ref 9–11.1)
RBC # BLD AUTO: 2.24 M/UL (ref 4.1–5.7)
SERVICE CMNT-IMP: NORMAL
SODIUM SERPL-SCNC: 139 MMOL/L (ref 136–145)
WBC # BLD AUTO: 11.7 K/UL (ref 4.1–11.1)

## 2021-12-19 PROCEDURE — 71260 CT THORAX DX C+: CPT

## 2021-12-19 PROCEDURE — 2709999900 HC NON-CHARGEABLE SUPPLY: Performed by: INTERNAL MEDICINE

## 2021-12-19 PROCEDURE — 77030008684 HC TU ET CUF COVD -B: Performed by: NURSE ANESTHETIST, CERTIFIED REGISTERED

## 2021-12-19 PROCEDURE — 77030010936 HC CLP LIG BSC -C: Performed by: INTERNAL MEDICINE

## 2021-12-19 PROCEDURE — 85610 PROTHROMBIN TIME: CPT

## 2021-12-19 PROCEDURE — 77010033678 HC OXYGEN DAILY

## 2021-12-19 PROCEDURE — 74011250636 HC RX REV CODE- 250/636: Performed by: NURSE ANESTHETIST, CERTIFIED REGISTERED

## 2021-12-19 PROCEDURE — 74011000258 HC RX REV CODE- 258: Performed by: INTERNAL MEDICINE

## 2021-12-19 PROCEDURE — 80048 BASIC METABOLIC PNL TOTAL CA: CPT

## 2021-12-19 PROCEDURE — 82962 GLUCOSE BLOOD TEST: CPT

## 2021-12-19 PROCEDURE — 74178 CT ABD&PLV WO CNTR FLWD CNTR: CPT

## 2021-12-19 PROCEDURE — 74011000250 HC RX REV CODE- 250: Performed by: NURSE ANESTHETIST, CERTIFIED REGISTERED

## 2021-12-19 PROCEDURE — 74011250637 HC RX REV CODE- 250/637: Performed by: NURSE PRACTITIONER

## 2021-12-19 PROCEDURE — 85027 COMPLETE CBC AUTOMATED: CPT

## 2021-12-19 PROCEDURE — 74011250637 HC RX REV CODE- 250/637: Performed by: INTERNAL MEDICINE

## 2021-12-19 PROCEDURE — C9113 INJ PANTOPRAZOLE SODIUM, VIA: HCPCS | Performed by: GENERAL ACUTE CARE HOSPITAL

## 2021-12-19 PROCEDURE — 74018 RADEX ABDOMEN 1 VIEW: CPT

## 2021-12-19 PROCEDURE — 86900 BLOOD TYPING SEROLOGIC ABO: CPT

## 2021-12-19 PROCEDURE — 74011250636 HC RX REV CODE- 250/636: Performed by: GENERAL ACUTE CARE HOSPITAL

## 2021-12-19 PROCEDURE — 74011250636 HC RX REV CODE- 250/636: Performed by: INTERNAL MEDICINE

## 2021-12-19 PROCEDURE — 77030019908 HC STETH ESOPH SIMS -A: Performed by: NURSE ANESTHETIST, CERTIFIED REGISTERED

## 2021-12-19 PROCEDURE — 74011000250 HC RX REV CODE- 250: Performed by: INTERNAL MEDICINE

## 2021-12-19 PROCEDURE — 65660000000 HC RM CCU STEPDOWN

## 2021-12-19 PROCEDURE — 74011250637 HC RX REV CODE- 250/637: Performed by: STUDENT IN AN ORGANIZED HEALTH CARE EDUCATION/TRAINING PROGRAM

## 2021-12-19 PROCEDURE — 76210000006 HC OR PH I REC 0.5 TO 1 HR: Performed by: INTERNAL MEDICINE

## 2021-12-19 PROCEDURE — 74011250637 HC RX REV CODE- 250/637: Performed by: GENERAL ACUTE CARE HOSPITAL

## 2021-12-19 PROCEDURE — 76060000032 HC ANESTHESIA 0.5 TO 1 HR: Performed by: INTERNAL MEDICINE

## 2021-12-19 PROCEDURE — 36415 COLL VENOUS BLD VENIPUNCTURE: CPT

## 2021-12-19 PROCEDURE — 0W3P8ZZ CONTROL BLEEDING IN GASTROINTESTINAL TRACT, VIA NATURAL OR ARTIFICIAL OPENING ENDOSCOPIC: ICD-10-PCS | Performed by: INTERNAL MEDICINE

## 2021-12-19 PROCEDURE — P9016 RBC LEUKOCYTES REDUCED: HCPCS

## 2021-12-19 PROCEDURE — 74011000250 HC RX REV CODE- 250: Performed by: GENERAL ACUTE CARE HOSPITAL

## 2021-12-19 PROCEDURE — 86644 CMV ANTIBODY: CPT

## 2021-12-19 PROCEDURE — 77030040361 HC SLV COMPR DVT MDII -B: Performed by: INTERNAL MEDICINE

## 2021-12-19 PROCEDURE — 74011000636 HC RX REV CODE- 636: Performed by: GENERAL ACUTE CARE HOSPITAL

## 2021-12-19 PROCEDURE — 86923 COMPATIBILITY TEST ELECTRIC: CPT

## 2021-12-19 PROCEDURE — 76010000138 HC OR TIME 0.5 TO 1 HR: Performed by: INTERNAL MEDICINE

## 2021-12-19 PROCEDURE — 36430 TRANSFUSION BLD/BLD COMPNT: CPT

## 2021-12-19 DEVICE — CLIP INT L235CM WRK CHAN DIA2.8MM OPN 11MM LCK MECHANISM MR: Type: IMPLANTABLE DEVICE | Site: STOMACH | Status: FUNCTIONAL

## 2021-12-19 DEVICE — WORKING LENGTH 235CM, WORKING CHANNEL 2.8MM
Type: IMPLANTABLE DEVICE | Site: STOMACH | Status: FUNCTIONAL
Brand: RESOLUTION 360 CLIP

## 2021-12-19 RX ORDER — METOCLOPRAMIDE HYDROCHLORIDE 5 MG/ML
5 INJECTION INTRAMUSCULAR; INTRAVENOUS EVERY 12 HOURS
Status: DISCONTINUED | OUTPATIENT
Start: 2021-12-19 | End: 2021-12-23

## 2021-12-19 RX ORDER — ONDANSETRON 4 MG/1
4 TABLET, ORALLY DISINTEGRATING ORAL
Status: DISCONTINUED | OUTPATIENT
Start: 2021-12-19 | End: 2021-12-24 | Stop reason: HOSPADM

## 2021-12-19 RX ORDER — METOCLOPRAMIDE HYDROCHLORIDE 5 MG/ML
5 INJECTION INTRAMUSCULAR; INTRAVENOUS ONCE
Status: COMPLETED | OUTPATIENT
Start: 2021-12-19 | End: 2021-12-19

## 2021-12-19 RX ORDER — PHENYLEPHRINE HCL IN 0.9% NACL 0.4MG/10ML
SYRINGE (ML) INTRAVENOUS AS NEEDED
Status: DISCONTINUED | OUTPATIENT
Start: 2021-12-19 | End: 2021-12-19 | Stop reason: HOSPADM

## 2021-12-19 RX ORDER — SODIUM CHLORIDE 9 MG/ML
INJECTION, SOLUTION INTRAVENOUS
Status: DISCONTINUED | OUTPATIENT
Start: 2021-12-19 | End: 2021-12-19 | Stop reason: HOSPADM

## 2021-12-19 RX ORDER — MORPHINE SULFATE 2 MG/ML
1 INJECTION, SOLUTION INTRAMUSCULAR; INTRAVENOUS
Status: DISCONTINUED | OUTPATIENT
Start: 2021-12-19 | End: 2021-12-24 | Stop reason: HOSPADM

## 2021-12-19 RX ORDER — MORPHINE SULFATE 2 MG/ML
0.5 INJECTION, SOLUTION INTRAMUSCULAR; INTRAVENOUS
Status: DISCONTINUED | OUTPATIENT
Start: 2021-12-19 | End: 2021-12-24 | Stop reason: HOSPADM

## 2021-12-19 RX ORDER — ONDANSETRON 2 MG/ML
INJECTION INTRAMUSCULAR; INTRAVENOUS AS NEEDED
Status: DISCONTINUED | OUTPATIENT
Start: 2021-12-19 | End: 2021-12-19 | Stop reason: HOSPADM

## 2021-12-19 RX ORDER — ONDANSETRON 2 MG/ML
4 INJECTION INTRAMUSCULAR; INTRAVENOUS AS NEEDED
Status: DISCONTINUED | OUTPATIENT
Start: 2021-12-19 | End: 2021-12-19 | Stop reason: HOSPADM

## 2021-12-19 RX ORDER — LIDOCAINE HYDROCHLORIDE 20 MG/ML
INJECTION, SOLUTION EPIDURAL; INFILTRATION; INTRACAUDAL; PERINEURAL AS NEEDED
Status: DISCONTINUED | OUTPATIENT
Start: 2021-12-19 | End: 2021-12-19 | Stop reason: HOSPADM

## 2021-12-19 RX ORDER — SODIUM CHLORIDE 9 MG/ML
250 INJECTION, SOLUTION INTRAVENOUS AS NEEDED
Status: DISCONTINUED | OUTPATIENT
Start: 2021-12-19 | End: 2021-12-24 | Stop reason: HOSPADM

## 2021-12-19 RX ORDER — OXYCODONE HYDROCHLORIDE 5 MG/1
5 TABLET ORAL AS NEEDED
Status: DISCONTINUED | OUTPATIENT
Start: 2021-12-19 | End: 2021-12-19 | Stop reason: HOSPADM

## 2021-12-19 RX ORDER — HYDROCORTISONE SODIUM SUCCINATE 100 MG/2ML
200 INJECTION, POWDER, FOR SOLUTION INTRAMUSCULAR; INTRAVENOUS ONCE
Status: COMPLETED | OUTPATIENT
Start: 2021-12-19 | End: 2021-12-19

## 2021-12-19 RX ORDER — FENTANYL CITRATE 50 UG/ML
25 INJECTION, SOLUTION INTRAMUSCULAR; INTRAVENOUS
Status: DISCONTINUED | OUTPATIENT
Start: 2021-12-19 | End: 2021-12-19 | Stop reason: HOSPADM

## 2021-12-19 RX ORDER — PROPOFOL 10 MG/ML
INJECTION, EMULSION INTRAVENOUS AS NEEDED
Status: DISCONTINUED | OUTPATIENT
Start: 2021-12-19 | End: 2021-12-19 | Stop reason: HOSPADM

## 2021-12-19 RX ORDER — DIPHENHYDRAMINE HYDROCHLORIDE 50 MG/ML
50 INJECTION, SOLUTION INTRAMUSCULAR; INTRAVENOUS ONCE
Status: COMPLETED | OUTPATIENT
Start: 2021-12-19 | End: 2021-12-19

## 2021-12-19 RX ORDER — SUCCINYLCHOLINE CHLORIDE 20 MG/ML
INJECTION INTRAMUSCULAR; INTRAVENOUS AS NEEDED
Status: DISCONTINUED | OUTPATIENT
Start: 2021-12-19 | End: 2021-12-19 | Stop reason: HOSPADM

## 2021-12-19 RX ADMIN — CHLORHEXIDINE GLUCONATE 15 ML: 1.2 RINSE ORAL at 22:00

## 2021-12-19 RX ADMIN — PROPOFOL 50 MG: 10 INJECTION, EMULSION INTRAVENOUS at 18:51

## 2021-12-19 RX ADMIN — DESMOPRESSIN ACETATE 19.04 MCG: 4 SOLUTION INTRAVENOUS at 15:58

## 2021-12-19 RX ADMIN — METOCLOPRAMIDE 5 MG: 5 INJECTION, SOLUTION INTRAMUSCULAR; INTRAVENOUS at 21:19

## 2021-12-19 RX ADMIN — METOCLOPRAMIDE 5 MG: 5 INJECTION, SOLUTION INTRAMUSCULAR; INTRAVENOUS at 14:04

## 2021-12-19 RX ADMIN — ONDANSETRON 4 MG: 4 TABLET, ORALLY DISINTEGRATING ORAL at 10:38

## 2021-12-19 RX ADMIN — Medication 120 MCG: at 18:41

## 2021-12-19 RX ADMIN — PANTOPRAZOLE SODIUM 40 MG: 40 TABLET, DELAYED RELEASE ORAL at 08:36

## 2021-12-19 RX ADMIN — Medication 200 MCG: at 18:44

## 2021-12-19 RX ADMIN — SODIUM CHLORIDE 40 MG: 9 INJECTION, SOLUTION INTRAMUSCULAR; INTRAVENOUS; SUBCUTANEOUS at 13:25

## 2021-12-19 RX ADMIN — Medication: at 08:36

## 2021-12-19 RX ADMIN — IOPAMIDOL 100 ML: 755 INJECTION, SOLUTION INTRAVENOUS at 22:15

## 2021-12-19 RX ADMIN — Medication 200 MCG: at 18:47

## 2021-12-19 RX ADMIN — METOCLOPRAMIDE 5 MG: 10 TABLET ORAL at 06:32

## 2021-12-19 RX ADMIN — Medication 200 MCG: at 18:51

## 2021-12-19 RX ADMIN — CHLORHEXIDINE GLUCONATE 15 ML: 1.2 RINSE ORAL at 08:36

## 2021-12-19 RX ADMIN — MORPHINE SULFATE 1 MG: 2 INJECTION, SOLUTION INTRAMUSCULAR; INTRAVENOUS at 21:19

## 2021-12-19 RX ADMIN — HYDROCORTISONE SODIUM SUCCINATE 200 MG: 100 INJECTION, POWDER, FOR SOLUTION INTRAMUSCULAR; INTRAVENOUS at 19:57

## 2021-12-19 RX ADMIN — LIDOCAINE HYDROCHLORIDE 100 MG: 20 INJECTION, SOLUTION INTRAVENOUS at 18:30

## 2021-12-19 RX ADMIN — MORPHINE SULFATE 1 MG: 2 INJECTION, SOLUTION INTRAMUSCULAR; INTRAVENOUS at 17:16

## 2021-12-19 RX ADMIN — ONDANSETRON 4 MG: 4 TABLET, ORALLY DISINTEGRATING ORAL at 04:41

## 2021-12-19 RX ADMIN — OXYCODONE 10 MG: 5 TABLET ORAL at 13:24

## 2021-12-19 RX ADMIN — Medication 10 ML: at 21:27

## 2021-12-19 RX ADMIN — Medication 10 ML: at 16:16

## 2021-12-19 RX ADMIN — OXYCODONE 10 MG: 5 TABLET ORAL at 06:32

## 2021-12-19 RX ADMIN — Medication 1 AMPULE: at 22:00

## 2021-12-19 RX ADMIN — PROPOFOL 140 MG: 10 INJECTION, EMULSION INTRAVENOUS at 18:31

## 2021-12-19 RX ADMIN — SUCCINYLCHOLINE CHLORIDE 120 MG: 20 INJECTION, SOLUTION INTRAMUSCULAR; INTRAVENOUS at 18:31

## 2021-12-19 RX ADMIN — Medication 1 AMPULE: at 08:36

## 2021-12-19 RX ADMIN — METOCLOPRAMIDE 5 MG: 5 INJECTION, SOLUTION INTRAMUSCULAR; INTRAVENOUS at 13:24

## 2021-12-19 RX ADMIN — Medication 3 AMPULE: at 17:01

## 2021-12-19 RX ADMIN — Medication 80 MCG: at 18:38

## 2021-12-19 RX ADMIN — SODIUM CHLORIDE: 0.9 INJECTION, SOLUTION INTRAVENOUS at 18:25

## 2021-12-19 RX ADMIN — ONDANSETRON HYDROCHLORIDE 4 MG: 2 INJECTION, SOLUTION INTRAMUSCULAR; INTRAVENOUS at 19:09

## 2021-12-19 RX ADMIN — DIPHENHYDRAMINE HYDROCHLORIDE 50 MG: 50 INJECTION, SOLUTION INTRAMUSCULAR; INTRAVENOUS at 19:57

## 2021-12-19 NOTE — ANESTHESIA PREPROCEDURE EVALUATION
Relevant Problems   RESPIRATORY SYSTEM   (+) Pneumonia      CARDIOVASCULAR   (+) CAD (coronary artery disease)   (+) Congestive heart failure, unspecified   (+) Coronary atherosclerosis of native coronary artery   (+) HTN (hypertension)      RENAL FAILURE   (+) ESRD (end stage renal disease) (HCC)   (+) ESRD (end stage renal disease) on dialysis (HCC)      HEMATOLOGY   (+) Anemia       Anesthetic History     PONV          Review of Systems / Medical History  Patient summary reviewed, nursing notes reviewed and pertinent labs reviewed    Pulmonary              Pertinent negatives: No COPD and asthma     Neuro/Psych   Within defined limits  seizures      Pertinent negatives: No CVA   Cardiovascular  Within defined limits  Hypertension      CHF: NYHA Classification III  Dysrhythmias   Pacemaker, past MI, CAD and cardiac stents    Exercise tolerance: <4 METS  Comments: TTE 11/2021  · LV: Estimated LVEF is 35 - 40%. Visually measured ejection fraction. Normal wall thickness. Mildly dilated left ventricle. · TV: Moderate tricuspid valve regurgitation is present. · PA: Moderate to severe pulmonary hypertension. Pulmonary arterial systolic pressure is 85 mmHg.       EKG (12/2021)  Sinus bradycardia   Low voltage QRS   Nonspecific T wave abnormality   Prolonged QT    GI/Hepatic/Renal         Renal disease: ESRD and dialysis  PUD     Endo/Other        Anemia  Pertinent negatives: No diabetes and obesity   Other Findings   Comments: Current admission (12/2021) for COVID PNA and GIB    Last HD on 12/18/21           Physical Exam    Airway  Mallampati: IV  TM Distance: 4 - 6 cm  Neck ROM: decreased range of motion   Mouth opening: Normal     Cardiovascular  Regular rate and rhythm,  S1 and S2 normal,  no murmur, click, rub, or gallop             Dental    Dentition: Poor dentition     Pulmonary                 Abdominal  GI exam deferred       Other Findings            Anesthetic Plan    ASA: 3  Anesthesia type: general          Induction: Intravenous and RSI  Anesthetic plan and risks discussed with: Patient      Depressed EF with severe pHTN.  Minimize hypercarbia

## 2021-12-19 NOTE — PROCEDURES
Central Line Procedure Note    Indication: Inadequate venous access    Risks, benefits, alternatives explained and patient agrees to proceed. Patient positioned in Trendelenburg. 7-Step Sterility Protocol followed. (cap, mask sterile gown, sterile gloves, large sterile sheet, hand hygiene, 2% chlorhexidine for cutaneous antisepsis)  5 mL 1% Lidocaine placed at insertion site. Right femoral cannulated x 1 attempt(s) utilizing the Seldinger technique. RIJ attempted first, but unable to advance wire past 10-12 cm. Possible clot visualized on ultrasound. No further intervention in RIJ. Catheter secured & Biopatch applied. Sterile Tegaderm placed. CXR pending.     Care turned over to covering Attending MD.

## 2021-12-19 NOTE — PROGRESS NOTES
Hospitalist Progress Note    NAME: Favio Lemus   :  1975   MRN:  [id number]       Assessment / Plan:    Hemorrhagic shock  Upper GI bleed  Thrombocytopenia   Ileus   Protein calorie malnutrition   transaminitis   EGD: distal esophageal ulcer with large clots in stomach  Repeat EGD: distal esophageal ulcer with blood in stomach  Hb stable, s/p transfusion of: 11 units PRBC, 3 units Plasma, 2 Cryoprecipitate, 3 units Plts  Gen Cheko not need for surgical intervention now  Keep Holding Coumadin   Epogen   Improving oral intake   Off TPN, may need to resume if NPO for long time   PPI BID IV  Reglan  IV  CBC q6h   Give 2 units today   D/w GI on call Dr Jamila Villarreal   Needs a 3rd EGD  Low threshold to send to ICU if deteriorates     B/l pleural effusion  Covid Pna, > 20 days, Off isolation now   B/l Pna  Sepsis   Recent Streptococcus intermedius bacteremia (2021)  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 Code status: Full  Prophylaxis: SCD's  Recommended Disposition: [facility]H/Rehab  Anticipated Discharge Date:  >48 hrs, does not want to continue with home HD, was to have HD at outpt center for short period of time after DC        Subjective:     Discussed with RN events overnight.    Afebrile  Denies CP and SOB  Leg swelling   abd pain  Vomited at 2 AM , dark brown  Dark blood BM this AM    Review of Systems:  Symptom Y/N Comments  Symptom Y/N Comments   Fever/Chills    Chest Pain     Poor Appetite    Edema     Cough    Abdominal Pain     Sputum    Joint Pain     SOB/ZAMUDIO    Pruritis/Rash     Nausea/vomit    Tolerating PT/OT     Diarrhea    Tolerating Diet     Constipation    Other       PO intake: No data found. Wt Readings from Last 10 Encounters:   12/18/21 63.4 kg (139 lb 12.8 oz)   11/17/21 66.1 kg (145 lb 12.8 oz)   04/07/21 66.8 kg (147 lb 4.3 oz)   03/27/21 67.4 kg (148 lb 9.4 oz)   11/08/20 68.6 kg (151 lb 4.8 oz)   11/07/20 67.1 kg (148 lb)   10/20/20 67.5 kg (148 lb 13 oz)   09/13/20 70 kg (154 lb 5.2 oz)   09/10/20 70 kg (154 lb 5.2 oz)   09/08/20 66.7 kg (147 lb 0.8 oz)       Objective:     VITALS:   Last 24hrs VS reviewed since prior progress note.  Most recent are:  Patient Vitals for the past 24 hrs:   Temp Pulse Resp BP SpO2   12/19/21 1101 99.4 °F (37.4 °C)       12/19/21 1050 100.2 °F (37.9 °C) (!) 108 13 100/61 100 %   12/19/21 0830 99 °F (37.2 °C) 100 19 (!) 105/58 100 %   12/19/21 0400  99 14  99 %   12/18/21 2345 99 °F (37.2 °C) 91 16 116/64 100 %   12/18/21 2006 98.7 °F (37.1 °C) 93 14 112/65 99 %   12/18/21 1600  95 19 (!) 151/99 100 %   12/18/21 1545 98.8 °F (37.1 °C) 94 19 125/79 100 %   12/18/21 1530  86 19 127/81 100 %   12/18/21 1515  74 20 124/60 100 %   12/18/21 1500  70 19 116/74 100 %   12/18/21 1445  74 25 113/72 100 %   12/18/21 1430  96 (!) 31 115/76 97 %   12/18/21 1415  74 20 103/78 100 %   12/18/21 1400  91 27 109/72 100 %   12/18/21 1345  89 28 109/75 100 %   12/18/21 1330  90 25 117/76 100 %   12/18/21 1315  75 19 118/70 100 %   12/18/21 1300  94 25 113/82 100 %   12/18/21 1245  83 22 121/71 100 %   12/18/21 1236  85 21 118/85 100 %       Intake/Output Summary (Last 24 hours) at 12/19/2021 1234  Last data filed at 12/18/2021 1600  Gross per 24 hour   Intake    Output 2500 ml   Net -2500 ml        I had a face to face encounter, and independently examined this patient on 12/19/2021, as outlined below:    PHYSICAL EXAM:  General:    No distress     HEENT: Atraumatic, anicteric sclerae, pink conjunctivae, MMM  Neck:  Supple, symmetrical  Lungs:   CTA. No Wheezing/Rhonchi. No rales. No tenderness. No Accessory muscle use. Heart:   Regular rhythm. No murmur. No JVD. Left UE AVF w + thrill   GI/:   Soft. NT. ND. BS normal  Extremities: + edema. No cyanosis. No clubbing. Skin:     Not pale. Not Jaundiced. No rashes   Psych:  Good insight. Not depressed. Not anxious or agitated. Neurologic: Alert and oriented X 4. EOMs intact. No facial asymmetry. No slurred speech. Symmetrical strength, Sensation grossly intact. Labs     I reviewed today's most current labs and imaging studies. Pertinent labs include:  Recent Labs     12/19/21  1139 12/18/21  0650 12/17/21  0438   WBC 11.7* 8.3 7.8   HGB 6.7* 9.0* 8.9*   HCT 20.9* 27.7* 28.0*   * 114* 89*     Recent Labs     12/19/21  1139 12/18/21  0650 12/17/21  0438    133* 135*   K 5.0 4.4 3.6    100 101   CO2 28 23 27   GLU 91 80 83   BUN 44* 65* 47*   CREA 4.66* 5.37* 3.70*   CA 7.9* 8.4* 8.3*   MG  --  2.6* 2.3   ALB  --  2.0* 2.0*   TBILI  --  0.7 0.6   ALT  --  40 45   INR 1.2*  --   --      XR ABD (KUB)    Result Date: 12/10/2021  Femoral catheter as above. XR ABD (KUB)    Result Date: 12/8/2021  Gaseous distention of the large and small bowel. No evidence of free intraperitoneal air on this single view. CT ABD PELV WO CONT    Result Date: 12/8/2021  1. Generalized gaseous distention of large and small bowel. Findings are consistent with a generalized ileus. 2. No evidence of bowel perforation. 3. Continued gastric distention with hemorrhagic material, similar to prior study. 4. NG tube in the stomach. 5. Covid 19 pneumonia has worsened in the visualized lung bases. 6. Continued small to moderate bilateral pleural effusions.  7. Severe diffuse calcific atherosclerosis. 8. Evidence of end-stage renal disease. CT ABD PELV W WO CONT    Result Date: 12/7/2021  1. There is a large amount of hemorrhage within a distended stomach, extending into the duodenum. An active bleed is not seen on this examination. However, delayed images were not performed, somewhat limiting the study. 2. Bilateral pleural effusions with bilateral pneumonia. XR CHEST PORT    Result Date: 12/9/2021  Unchanged patchy bilateral airspace disease. XR CHEST PORT    Result Date: 12/8/2021  NG tube is coiled in the patient's throat, and terminates in the mid/distal thoracic esophagus. Recommend repositioning. XR CHEST PORT    Result Date: 12/7/2021  No change. XR ABD PORT  1 V    Result Date: 12/7/2021  1. Rotated film. Femoral line overlies the right pelvis     XR ABD PORT  1 V    Result Date: 12/7/2021  Distended stomach, NG tube in place. IR INSERT NON TUNL CVC OVER 5 YRS    Result Date: 12/10/2021  Successful exchange of left femoral Jorge A catheter. The catheter is ready for immediate use. IR INSERT NON TUNL CVC OVER 5 YRS    Result Date: 12/7/2021  1. Successful placement of left common femoral temporary dialysis catheter. The catheter is ready for immediate use. No results found. 11/05/21    ECHO EVER W OR WO CONTRAST 11/11/2021 11/11/2021    Interpretation Summary  · No signs for endocarditis by EVER. · LV: Estimated LVEF is 35 - 40%. Normal cavity size. Mildly increased wall thickness. Moderately reduced systolic function. · MV: Mitral valve leaflet calcification. Mild mitral annular calcification. 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 · TV: Moderate tricuspid valve regurgitation is present. · PA: Moderate pulmonary hypertension.     Signed by: Sonido Khan MD on 11/11/2021 10:42 AM       Current Medications:     Current Facility-Administered Medications:     ondansetron (ZOFRAN ODT) tablet 4 mg, 4 mg, Oral, Q6H PRN, Sandra Don MD, 4 mg at 12/19/21 1038    0.9% sodium chloride infusion 250 mL, 250 mL, IntraVENous, PRN, Sandra Don MD    metoclopramide HCl (REGLAN) injection 5 mg, 5 mg, IntraVENous, Q12H, Sandra Don MD    pantoprazole (PROTONIX) 40 mg in 0.9% sodium chloride 10 mL injection, 40 mg, IntraVENous, Q12H, Atif Don MD    [Held by provider] metoclopramide HCl (REGLAN) tablet 5 mg, 5 mg, Oral, BID, Atif Don MD, 5 mg at 12/19/21 4811    [Held by provider] pantoprazole (PROTONIX) tablet 40 mg, 40 mg, Oral, ACB&D, Atif Don MD, 40 mg at 12/19/21 0836    calcium carbonate (TUMS) chewable tablet 400 mg [elemental], 400 mg, Oral, QID PRN, Purlul, Atoosa, ACNP, 400 mg at 12/18/21 2134    insulin lispro (HUMALOG) injection, , SubCUTAneous, AC&HS, Atif Don MD    oxyCODONE IR (ROXICODONE) tablet 5 mg, 5 mg, Oral, Q4H PRN, Purveyor, Atoosa, ACNP    oxyCODONE IR (ROXICODONE) tablet 10 mg, 10 mg, Oral, Q4H PRN, Purveyor, Atoosa, ACNP, 10 mg at 12/19/21 8178    diphenhydrAMINE (BENADRYL) capsule 25 mg, 25 mg, Oral, QHS PRN, Cameron García MD, 25 mg at 12/15/21 2120    balsam peru-castor oiL (VENELEX) ointment, , Topical, Q12H, Cameron García MD, Given at 12/19/21 0836    epoetin emilie-epbx (RETACRIT) injection 20,000 Units, 20,000 Units, SubCUTAneous, Q TUE, THU & SAT, Yasmeen HERNÁNDEZ MD, 20,000 Units at 12/18/21 2200    glucagon (GLUCAGEN) injection 1 mg, 1 mg, IntraMUSCular, PRN, Barby Villela MD    dextrose (D50W) injection syrg 12.5-25 g, 12.5-25 g, IntraVENous, PRN, Barby Villela MD, 25 g at 12/09/21 1719    simethicone (MYLICON) 02KX/3.0WJ oral drops 80 mg, 1.2 mL, Oral, Multiple, Ashley Gaitan MD    [Held by provider] calcium acetate(phosphat bind) (PHOSLO) capsule 1,334 mg, 2 Capsule, Per NG tube, TID WITH MEALS, Barby Villela MD    albuterol-ipratropium (DUO-NEB) 2.5 MG-0.5 MG/3 ML, 3 mL, Nebulization, Q4H PRN, Sheree Mahoney MD    albumin human 25% (BUMINATE) solution 25 g, 25 g, IntraVENous, DIALYSIS PRN, Dilan Wolff MD, 25 g at 12/07/21 1318    alcohol 62% (NOZIN) nasal  1 Ampule, 1 Ampule, Topical, Q12H, Sheree Mahoney MD, 1 Ampule at 12/19/21 0836    chlorhexidine (PERIDEX) 0.12 % mouthwash 15 mL, 15 mL, Oral, Q12H, Sheree Mahoney MD, 15 mL at 12/19/21 0836    acetaminophen (TYLENOL) tablet 650 mg, 650 mg, Oral, Q6H PRN, Ailin Pires MD, 650 mg at 12/18/21 0343    [Held by provider] L.acidophilus-paracasei-S.thermophil-bifidobacter (RISAQUAD) 8 billion cell capsule, 1 Capsule, Oral, DAILY, Shweta Grimm MD, 1 Capsule at 12/05/21 0909    sodium chloride (NS) flush 5-40 mL, 5-40 mL, IntraVENous, Q8H, Shweta Grimm MD, 10 mL at 12/17/21 0600    sodium chloride (NS) flush 5-40 mL, 5-40 mL, IntraVENous, PRN, Shweta Gaines MD, 10 mL at 12/16/21 0815    polyethylene glycol (MIRALAX) packet 17 g, 17 g, Oral, DAILY PRN, Yash Daley MD     Procedures: see electronic medical records for all procedures/Xrays and details which were not copied into this note but were reviewed prior to creation of Plan. Reviewed most current lab test results and cultures  YES  Reviewed most current radiology test results   YES  Review and summation of old records today    NO  Reviewed patient's current orders and MAR    YES  PMH/SH reviewed - no change compared to H&P  ________________________________________________________________________  Care Plan discussed with:    Comments   Patient x    Family      RN x    Care Manager     Consultant  x VIKTORIA                     Multidiciplinary team rounds were held today with , nursing, pharmacist and clinical coordinator. Patient's plan of care was discussed; medications were reviewed and discharge planning was addressed. ________________________________________________________________________  Total NON critical care TIME:    Minutes    Total CRITICAL CARE TIME Spent: 54  Minutes non procedure based      Comments   >50% of visit spent in counseling and coordination of care x     This includes time during multidisciplinary rounds if indicated above   ________________________________________________________________________  Nina Jimenez MD

## 2021-12-19 NOTE — H&P
[address], [address]      History and Physical       NAME:  Wolfgang Berumen   :   1975   MRN:   [id number]     Chief Complaint: Melena    History of Present Illness:  Patient is a 42 y. o. who is seen for recurrent melena. PMH:  Past Medical History:   Diagnosis Date    Abdominal hematoma     AICD (automatic cardioverter/defibrillator) present     CAD (coronary artery disease)     anterior MI s/p 2 stents      Chronic abdominal pain     Chronic kidney disease     ESRD; Dialysis dependent.  [facility]- [facility] tpke    DVT (deep venous thrombosis) ([address]     DVT of popliteal vein ([address] 2011    not anticoagulated due to bleeding, IVC filter    Endocarditis     Gallstone pancreatitis     Gastrointestinal disorder     acid reflux    Gastrointestinal disorder     peptic ulcer    Hemodialysis patient ([address]     High cholesterol     Hypertension     Kidney transplant     b/l kidneys ,     Long term current use of anticoagulant therapy     Nephrotic syndrome     Other ill-defined conditions(799.89)     kidny transplant x2,  on dialysis    Other ill-defined conditions(799.89)     home dialysis    Other ill-defined conditions(799.89)     hx recurrent left leg DVT    Peritonitis ([address]     Seizures ([address] 2015    most recent- only had three in lifetime    Small bowel obstruction (HCC)     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[address]     HIT antibody positive 2011    V-tach ([address]        PSH:  Past Surgical History:   Procedure Laterality Date    HX APPENDECTOMY      HX CHOLECYSTECTOMY      HX OTHER SURGICAL  2011    exploratory laparotomy    HX OTHER SURGICAL      fem-pop    HX OTHER SURGICAL  2013    right upper extremity fistula    HX PACEMAKER Left 2009    AICD-st latonya-not connected    HX PACEMAKER Left     AICD-left side-BS-working    HX SMALL BOWEL RESECTION      HX TRANSPLANT       Kidney  HX TRANSPLANT  1992    kidney    HX VASCULAR ACCESS Right     femoral access for HD- does 5 day dialysis @ home    IR INSERT NON TUNL CVC OVER 5 YRS  12/7/2021    IR INSERT NON TUNL CVC OVER 5 YRS  12/10/2021    IR REMOVE TUNL CVAD W/O PORT / PUMP  10/29/2020    IR REPLACE CVC TUNNELED W/O PORT  9/10/2020    UT CARDIAC SURG PROCEDURE UNLIST  2007    2 stents    UT EDG US EXAM SURGICAL ALTER STOM DUODENUM/JEJUNUM  7/15/2011         UT ESOPHAGOGASTRODUODENOSCOPY TRANSORAL DIAGNOSTIC  5/24/2012         UPPER GI ENDOSCOPY,CTRL BLEED  12/6/2021         UPPER GI ENDOSCOPY,DIAGNOSIS  12/8/2021         VASCULAR SURGERY PROCEDURE UNLIST  11/14/2017    Insertion AV graft right upper arm (bovine); ligation of AV fistula right arm       Allergies: Allergies   Allergen Reactions    Shellfish Containing Products Swelling     Break out in rash, trouble breathing    Contrast Agent [Iodine] Shortness of Breath    Heparin Analogues Other (comments)     Positive history of HIT       Home Medications:  Prior to Admission Medications   Prescriptions Last Dose Informant Patient Reported? Taking?   acetaminophen (TYLENOL) 500 mg tablet  Self No No   Sig: Take 1 Tab by mouth every four (4) hours as needed for Pain. Over the counter   aspirin 81 mg chewable tablet  Self Yes No   Sig: Take 81 mg by mouth daily. atorvastatin (Lipitor) 20 mg tablet  Self Yes No   Sig: Take 20 mg by mouth daily. calcitRIOL (ROCALTROL) 0.5 mcg capsule  Self Yes No   Sig: Take 0.5 mcg by mouth daily. calcium acetate,phosphat bind, (PHOSLO) 667 mg cap   No No   Sig: Take 2 Caps by mouth three (3) times daily (with meals). Indications: low amount of calcium in the blood, renal osteodystrophy with hyperphosphatemia   metoprolol succinate (TOPROL-XL) 25 mg XL tablet   No No   Sig: Take 0.5 Tabs by mouth daily. omeprazole (PRILOSEC) 20 mg capsule  Self Yes No   Sig: Take 20 mg by mouth daily.    ondansetron (Zofran ODT) 4 mg disintegrating tablet   No No   Si Tab by SubLINGual route every eight (8) hours as needed for Nausea or Vomiting.   vancomycin in 0.9% sodium chloride (VANCOCIN) 1 gram/200 mL pgbk   No No   Si mL by IntraVENous route every Monday, Wednesday, Friday for 8 doses. After hemodialysis treatment   warfarin (COUMADIN) 2.5 mg tablet  Self Yes No   Sig: Take 2.5 mg by mouth daily.       Facility-Administered Medications: None       Hospital Medications:  Current Facility-Administered Medications   Medication Dose Route Frequency    ondansetron (ZOFRAN ODT) tablet 4 mg  4 mg Oral Q6H PRN    0.9% sodium chloride infusion 250 mL  250 mL IntraVENous PRN    metoclopramide HCl (REGLAN) injection 5 mg  5 mg IntraVENous Q12H    pantoprazole (PROTONIX) 40 mg in 0.9% sodium chloride 10 mL injection  40 mg IntraVENous Q12H    [Held by provider] metoclopramide HCl (REGLAN) tablet 5 mg  5 mg Oral BID    [Held by provider] pantoprazole (PROTONIX) tablet 40 mg  40 mg Oral ACB&D    calcium carbonate (TUMS) chewable tablet 400 mg [elemental]  400 mg Oral QID PRN    insulin lispro (HUMALOG) injection   SubCUTAneous AC&HS    oxyCODONE IR (ROXICODONE) tablet 5 mg  5 mg Oral Q4H PRN    oxyCODONE IR (ROXICODONE) tablet 10 mg  10 mg Oral Q4H PRN    diphenhydrAMINE (BENADRYL) capsule 25 mg  25 mg Oral QHS PRN    balsam peru-castor oiL (VENELEX) ointment   Topical Q12H    epoetin emilie-epbx (RETACRIT) injection 20,000 Units  20,000 Units SubCUTAneous Q TUE, THU & SAT    glucagon (GLUCAGEN) injection 1 mg  1 mg IntraMUSCular PRN    dextrose (D50W) injection syrg 12.5-25 g  12.5-25 g IntraVENous PRN    simethicone (MYLICON) 26WS/1.2HC oral drops 80 mg  1.2 mL Oral Multiple    [Held by provider] calcium acetate(phosphat bind) (PHOSLO) capsule 1,334 mg  2 Capsule Per NG tube TID WITH MEALS    albuterol-ipratropium (DUO-NEB) 2.5 MG-0.5 MG/3 ML  3 mL Nebulization Q4H PRN    albumin human 25% (BUMINATE) solution 25 g  25 g IntraVENous DIALYSIS PRN    alcohol 62% (NOZIN) nasal  1 Ampule  1 Ampule Topical Q12H    chlorhexidine (PERIDEX) 0.12 % mouthwash 15 mL  15 mL Oral Q12H    acetaminophen (TYLENOL) tablet 650 mg  650 mg Oral Q6H PRN    [Held by provider] L.acidophilus-paracasei-S.thermophil-bifidobacter (RISAQUAD) 8 billion cell capsule  1 Capsule Oral DAILY    sodium chloride (NS) flush 5-40 mL  5-40 mL IntraVENous Q8H    sodium chloride (NS) flush 5-40 mL  5-40 mL IntraVENous PRN    polyethylene glycol (MIRALAX) packet 17 g  17 g Oral DAILY PRN       Social History:  Social History     Tobacco Use    Smoking status: Never Smoker    Smokeless tobacco: Never Used   Substance Use Topics    Alcohol use: No       Family History:  Family History   Problem Relation Age of Onset    COPD Mother     Lung Disease Mother     Cancer Father         stomach    Cancer Maternal Grandmother                    Review of Systems:      Constitutional: - fever, - chills, - weight loss  Eyes:   - visual changes  ENT:   - sore throat, - tongue or lip swelling  Respiratory:  - cough, - dyspnea  Cards:  - chest pain, - diaphoresis, - palpitations, - lower extremity edema  GI:   See HPI  :  - frequency, - dysuria  Integument:  - rash, - pruritus  Heme:  - easy bruising, - gum/nose bleeding  Musculoskel: - for myalgias,  - back pain, - muscle weakness  Neuro:  - headaches, - dizziness  Psych: - feelings of anxiety, - feelings of depression      Objective:     Patient Vitals for the past 8 hrs:   BP Temp Pulse Resp SpO2   12/19/21 1323 115/74 99.1 °F (37.3 °C) (!) 101 24 100 %   12/19/21 1308 101/60 99.2 °F (37.3 °C) (!) 102 23 100 %   12/19/21 1101  99.4 °F (37.4 °C)      12/19/21 1050 100/61 100.2 °F (37.9 °C) (!) 108 13 100 %   12/19/21 0830 (!) 105/58 99 °F (37.2 °C) 100 19 100 %     No intake/output data recorded.   12/17 1901 - 12/19 0700  In: -   Out: 2500     General: Chronically ill appearing, cooperative, in no acute distress HEENT: Atraumatic, Anicteric sclerae. Chest/ Lungs: Adequate air movement bilaterally, no overt wheezing, rhonchi or rales. Heart:  Regular rate & rhythm, no murmurs  Abdomen: Soft, Non distended, Non tender. +Bowel sounds  Extremities: No clubbing or cyanosis  Neurologic:  Grossly alert & oriented  Skin:   No jaundice, no rashes  Psych:   Appropriate mood and affect, appropriate insight     Data Review     Recent Labs     12/19/21  1139 12/18/21  0650   WBC 11.7* 8.3   HGB 6.7* 9.0*   HCT 20.9* 27.7*   * 114*     Recent Labs     12/19/21  1139 12/18/21  0650    133*   K 5.0 4.4    100   CO2 28 23   BUN 44* 65*   CREA 4.66* 5.37*   GLU 91 80   CA 7.9* 8.4*     Recent Labs     12/18/21  0650 12/17/21  0438   * 141*   TP 6.1* 6.2*   ALB 2.0* 2.0*   GLOB 4.1* 4.2*     Recent Labs     12/19/21  1139   INR 1.2*   PTP 12.3*          Assessment:     · 43 yo M s/p cadaveric renal transplant, since failed- now back on RRT, CAD, atherosclerotic dz, s/p AICD, presented > 20 days ago w/ COVID pneumonia, since resolved w/ no ongoing evidence of infection, GI previously consulted for suspected GI bleed, distal esophageal ulcerations seen w/ evidence of recent hemorrhage in the form of adherent clots, injected, plan had been to discharge him a few days ago, but today, Hg was down > 2g/dL in the setting of a melenic stool. · On PPI  · Dosed with 10 mg Metoclopramide  · giving . 3 mg/kg Desmopressin IVPB  · NPO  · Plan for EGD in OR at 16:30  · 1st of 2x units going  · New central femoral access in place  · Discussed w/ hospitalist- agree with plan for CTA to help localize bleeding and as a guide for OR if I am unable to stop this bleeding- dosing with 200 mg Solucortef & 50 mg Benadryl to pre-medicate for reported contrast allergy- he tolerated IV contrast fine in November 2021 without 24 hours of pre-medication- just this type of regimen immediately before the study       Patient Active Problem List Diagnosis Code    Kidney transplant     Coronary atherosclerosis of native coronary artery I25.10    Nausea & vomiting R11.2    Serum total bilirubin elevated R17    Abdominal pain R10.9    HTN (hypertension) I10    Anemia D64.9    S/P appendectomy Z90.49    High cholesterol E78.00    Other ill-defined conditions(799.89) R69    CAD (coronary artery disease) I25.10    Gastrointestinal disorder K92.9    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[facility]) D69.6    Peritonitis ([facility] K65.9    Malnutrition ([facility] E46    DVT (deep venous thrombosis) ([facility]) I82.409    S/P IVC filter Z95.828    Arterial occlusion I70.90    S/p cadaver renal transplant Z94.0    AICD (automatic cardioverter/defibrillator) present Z95.810    ESRD (end stage renal disease) on dialysis ([facility]) N18.6, Z99.2    Dialysis patient ([facility] Z99.2    Congestive heart failure, unspecified I50.9    Hypoglycemia E16.2    Sepsis ([facility]) A41.9    Pneumonia J18.9    Abnormal EKG R94.31    ESRD (end stage renal disease) ([facility]) N18.6    Leucocytosis D72.829    Gram-positive bacteremia R78.81    Fever R50.9    Acute COVID-19 U07.1    Goals of care, counseling/discussion Z71.89    DNR (do not resuscitate) discussion Z71.89    Need for emotional support R45.89       Plan:   ·   · EGD with moderate sedation     Signed By: Nena Pillai., MD     12/19/2021  2:08 PM

## 2021-12-19 NOTE — PROGRESS NOTES
+COVID, diagnosed on 11/27/21, had symptoms 3 days prior to admission. In other wards > 20 days.   Currently on RA, no cough, no SOB and asymptomatic  Will DC isolation    Rudy Tolbert MD

## 2021-12-20 LAB
ABO + RH BLD: NORMAL
ALBUMIN SERPL-MCNC: 2.1 G/DL (ref 3.5–5)
ALBUMIN/GLOB SERPL: 0.5 {RATIO} (ref 1.1–2.2)
ALP SERPL-CCNC: 136 U/L (ref 45–117)
ALT SERPL-CCNC: 32 U/L (ref 12–78)
ANION GAP SERPL CALC-SCNC: 10 MMOL/L (ref 5–15)
AST SERPL-CCNC: 20 U/L (ref 15–37)
BASOPHILS # BLD: 0 K/UL (ref 0–0.1)
BASOPHILS NFR BLD: 0 % (ref 0–1)
BILIRUB SERPL-MCNC: 0.6 MG/DL (ref 0.2–1)
BLD PROD TYP BPU: NORMAL
BLD PROD TYP BPU: NORMAL
BLOOD GROUP ANTIBODIES SERPL: NORMAL
BPU ID: NORMAL
BPU ID: NORMAL
BUN SERPL-MCNC: 68 MG/DL (ref 6–20)
BUN/CREAT SERPL: 12 (ref 12–20)
CALCIUM SERPL-MCNC: 8.4 MG/DL (ref 8.5–10.1)
CHLORIDE SERPL-SCNC: 105 MMOL/L (ref 97–108)
CO2 SERPL-SCNC: 25 MMOL/L (ref 21–32)
CREAT SERPL-MCNC: 5.89 MG/DL (ref 0.7–1.3)
CROSSMATCH RESULT,%XM: NORMAL
CROSSMATCH RESULT,%XM: NORMAL
DIFFERENTIAL METHOD BLD: ABNORMAL
EOSINOPHIL # BLD: 0 K/UL (ref 0–0.4)
EOSINOPHIL NFR BLD: 0 % (ref 0–7)
ERYTHROCYTE [DISTWIDTH] IN BLOOD BY AUTOMATED COUNT: 19.2 % (ref 11.5–14.5)
GLOBULIN SER CALC-MCNC: 4.1 G/DL (ref 2–4)
GLUCOSE BLD STRIP.AUTO-MCNC: 101 MG/DL (ref 65–117)
GLUCOSE BLD STRIP.AUTO-MCNC: 101 MG/DL (ref 65–117)
GLUCOSE BLD STRIP.AUTO-MCNC: 109 MG/DL (ref 65–117)
GLUCOSE BLD STRIP.AUTO-MCNC: 121 MG/DL (ref 65–117)
GLUCOSE SERPL-MCNC: 119 MG/DL (ref 65–100)
HCT VFR BLD AUTO: 29.8 % (ref 36.6–50.3)
HGB BLD-MCNC: 9.3 G/DL (ref 12.1–17)
IMM GRANULOCYTES # BLD AUTO: 0 K/UL (ref 0–0.04)
IMM GRANULOCYTES NFR BLD AUTO: 0 % (ref 0–0.5)
LYMPHOCYTES # BLD: 1 K/UL (ref 0.8–3.5)
LYMPHOCYTES NFR BLD: 14 % (ref 12–49)
MAGNESIUM SERPL-MCNC: 2.7 MG/DL (ref 1.6–2.4)
MCH RBC QN AUTO: 29.2 PG (ref 26–34)
MCHC RBC AUTO-ENTMCNC: 31.2 G/DL (ref 30–36.5)
MCV RBC AUTO: 93.4 FL (ref 80–99)
MONOCYTES # BLD: 0.2 K/UL (ref 0–1)
MONOCYTES NFR BLD: 3 % (ref 5–13)
NEUTS SEG # BLD: 5.8 K/UL (ref 1.8–8)
NEUTS SEG NFR BLD: 82 % (ref 32–75)
NRBC # BLD: 0.02 K/UL (ref 0–0.01)
NRBC BLD-RTO: 0.3 PER 100 WBC
PHOSPHATE SERPL-MCNC: 5.7 MG/DL (ref 2.6–4.7)
PLATELET # BLD AUTO: 143 K/UL (ref 150–400)
PMV BLD AUTO: 11.6 FL (ref 8.9–12.9)
POTASSIUM SERPL-SCNC: 5.7 MMOL/L (ref 3.5–5.1)
PROT SERPL-MCNC: 6.2 G/DL (ref 6.4–8.2)
RBC # BLD AUTO: 3.19 M/UL (ref 4.1–5.7)
SERVICE CMNT-IMP: ABNORMAL
SERVICE CMNT-IMP: NORMAL
SODIUM SERPL-SCNC: 140 MMOL/L (ref 136–145)
SPECIMEN EXP DATE BLD: NORMAL
STATUS OF UNIT,%ST: NORMAL
STATUS OF UNIT,%ST: NORMAL
UNIT DIVISION, %UDIV: 0
UNIT DIVISION, %UDIV: 0
WBC # BLD AUTO: 7 K/UL (ref 4.1–11.1)

## 2021-12-20 PROCEDURE — 74011250636 HC RX REV CODE- 250/636: Performed by: INTERNAL MEDICINE

## 2021-12-20 PROCEDURE — 74011250637 HC RX REV CODE- 250/637: Performed by: INTERNAL MEDICINE

## 2021-12-20 PROCEDURE — 36415 COLL VENOUS BLD VENIPUNCTURE: CPT

## 2021-12-20 PROCEDURE — 74011636637 HC RX REV CODE- 636/637: Performed by: INTERNAL MEDICINE

## 2021-12-20 PROCEDURE — 97530 THERAPEUTIC ACTIVITIES: CPT

## 2021-12-20 PROCEDURE — 74011000250 HC RX REV CODE- 250: Performed by: GENERAL ACUTE CARE HOSPITAL

## 2021-12-20 PROCEDURE — 82962 GLUCOSE BLOOD TEST: CPT

## 2021-12-20 PROCEDURE — 84100 ASSAY OF PHOSPHORUS: CPT

## 2021-12-20 PROCEDURE — 85025 COMPLETE CBC W/AUTO DIFF WBC: CPT

## 2021-12-20 PROCEDURE — C9113 INJ PANTOPRAZOLE SODIUM, VIA: HCPCS | Performed by: GENERAL ACUTE CARE HOSPITAL

## 2021-12-20 PROCEDURE — 65660000000 HC RM CCU STEPDOWN

## 2021-12-20 PROCEDURE — 80053 COMPREHEN METABOLIC PANEL: CPT

## 2021-12-20 PROCEDURE — 74011000250 HC RX REV CODE- 250: Performed by: INTERNAL MEDICINE

## 2021-12-20 PROCEDURE — 74011250636 HC RX REV CODE- 250/636: Performed by: GENERAL ACUTE CARE HOSPITAL

## 2021-12-20 PROCEDURE — 97535 SELF CARE MNGMENT TRAINING: CPT

## 2021-12-20 PROCEDURE — 83735 ASSAY OF MAGNESIUM: CPT

## 2021-12-20 PROCEDURE — 97116 GAIT TRAINING THERAPY: CPT

## 2021-12-20 RX ADMIN — SODIUM CHLORIDE 40 MG: 9 INJECTION, SOLUTION INTRAMUSCULAR; INTRAVENOUS; SUBCUTANEOUS at 21:42

## 2021-12-20 RX ADMIN — OCTREOTIDE ACETATE 25 MCG/HR: 500 INJECTION, SOLUTION INTRAVENOUS; SUBCUTANEOUS at 17:27

## 2021-12-20 RX ADMIN — Medication 1 AMPULE: at 21:41

## 2021-12-20 RX ADMIN — MORPHINE SULFATE 1 MG: 2 INJECTION, SOLUTION INTRAMUSCULAR; INTRAVENOUS at 12:15

## 2021-12-20 RX ADMIN — MORPHINE SULFATE 1 MG: 2 INJECTION, SOLUTION INTRAMUSCULAR; INTRAVENOUS at 17:25

## 2021-12-20 RX ADMIN — Medication: at 21:48

## 2021-12-20 RX ADMIN — Medication 10 ML: at 21:42

## 2021-12-20 RX ADMIN — Medication 10 ML: at 14:00

## 2021-12-20 RX ADMIN — Medication 10 ML: at 06:22

## 2021-12-20 RX ADMIN — Medication: at 00:19

## 2021-12-20 RX ADMIN — CHLORHEXIDINE GLUCONATE 15 ML: 1.2 RINSE ORAL at 21:41

## 2021-12-20 RX ADMIN — SODIUM ZIRCONIUM CYCLOSILICATE 10 G: 10 POWDER, FOR SUSPENSION ORAL at 17:26

## 2021-12-20 RX ADMIN — METOCLOPRAMIDE 5 MG: 5 INJECTION, SOLUTION INTRAMUSCULAR; INTRAVENOUS at 21:43

## 2021-12-20 RX ADMIN — MORPHINE SULFATE 1 MG: 2 INJECTION, SOLUTION INTRAMUSCULAR; INTRAVENOUS at 06:22

## 2021-12-20 RX ADMIN — Medication: at 10:51

## 2021-12-20 RX ADMIN — MORPHINE SULFATE 1 MG: 2 INJECTION, SOLUTION INTRAMUSCULAR; INTRAVENOUS at 21:56

## 2021-12-20 RX ADMIN — SODIUM CHLORIDE 40 MG: 9 INJECTION, SOLUTION INTRAMUSCULAR; INTRAVENOUS; SUBCUTANEOUS at 00:19

## 2021-12-20 RX ADMIN — Medication 1 AMPULE: at 09:00

## 2021-12-20 RX ADMIN — SODIUM CHLORIDE 40 MG: 9 INJECTION, SOLUTION INTRAMUSCULAR; INTRAVENOUS; SUBCUTANEOUS at 10:50

## 2021-12-20 RX ADMIN — METOCLOPRAMIDE 5 MG: 5 INJECTION, SOLUTION INTRAMUSCULAR; INTRAVENOUS at 10:51

## 2021-12-20 NOTE — PROGRESS NOTES
Problem: Mobility Impaired (Adult and Pediatric)  Goal: *Acute Goals and Plan of Care (Insert Text)  Description: FUNCTIONAL STATUS PRIOR TO ADMISSION: Patient was independent and active without use of DME.    HOME SUPPORT PRIOR TO ADMISSION: The patient lived with sister. Physical Therapy Goals  Initiated 11/30/2021  1. Patient will move from supine to sit and sit to supine  in bed with independence within 7 day(s). 2.  Patient will transfer from bed to chair and chair to bed with modified independence using the least restrictive device within 7 day(s). 3.  Patient will perform sit to stand with modified independence within 7 day(s). 4.  Patient will ambulate with modified independence for 100 feet with the least restrictive device within 7 day(s). Reviewed 12/15- goals remain appropriate. Outcome: Progressing Towards Goal   PHYSICAL THERAPY TREATMENT  Patient: Misha Campbell (30 y.o. male)  Date: 12/20/2021  Diagnosis: Acute COVID-19 [U07.1] <principal problem not specified>  Procedure(s) (LRB):  ESOPHAGOGASTRODUODENOSCOPY (EGD) WITH RESOLUTION CLIP  (N/A)  RESOLUTION CLIP (N/A) 1 Day Post-Op  Precautions: Contact,Fall,Skin (COVID)  Chart, physical therapy assessment, plan of care and goals were reviewed. ASSESSMENT  Patient continues with skilled PT services and is progressing towards goals. RN cleared pt for PT/OT session. Pt is found relaxing in recliner, agreeable to esparza amb. Pt requested to have RW, for resting support. Pt greatly increase amb distance and was able to perform 60 ' of amb without AD. Mr Jun Barcenas reports the he is considering D/c to home instead of Rehab. Will follow up to assess consistency of gains observed. Current Level of Function Impacting Discharge (mobility/balance): Supervision-SBA    Other factors to consider for discharge: Occasional need for RW          PLAN :  Patient continues to benefit from skilled intervention to address the above impairments.   Continue treatment per established plan of care. to address goals. Recommendation for discharge: (in order for the patient to meet his/her long term goals)  Physical therapy at least 2 days/week in the home vs TBD    This discharge recommendation:  Has not yet been discussed the attending provider and/or case management    IF patient discharges home will need the following DME: rolling walker       SUBJECTIVE:   Patient stated I don't know If I need to go to rehab anymore.     OBJECTIVE DATA SUMMARY:   Critical Behavior:  Neurologic State: Alert  Orientation Level: Oriented X4  Cognition: Follows commands  Safety/Judgement: Awareness of environment  Functional Mobility Training:  Transfers:  Sit to Stand: Supervision  Stand to Sit: Supervision   Balance:  Sitting: Intact  Standing: Impaired  Standing - Static: Good  Standing - Dynamic : Fair  Ambulation/Gait Training:  Distance (ft): 200 Feet (ft)  Assistive Device: Gait belt;Walker, rolling  Ambulation - Level of Assistance: Stand-by assistance  Gait Abnormalities: Decreased step clearance;Shuffling gait  Base of Support: Narrowed  Speed/Bernarda: Slow  Step Length: Left shortened;Right shortened  Pain Rating:  Pain in: 0  Pain out: 0  Activity Tolerance:   Fair    After treatment patient left in no apparent distress:   Sitting in chair and Call bell within reach    COMMUNICATION/COLLABORATION:   The patients plan of care was discussed with: Occupational therapist and Registered nurse.      Catarina Krabbe, PTA   Time Calculation: 25 mins

## 2021-12-20 NOTE — PROGRESS NOTES
GI PROGRESS NOTE  Temi Hanna, NP  [phone] NP in-hospital cell phone M-F until 4:30  After 5pm or on weekends, please call  for physician on call    NAME:Claude Wendy [place] :1975 UYU:[id number]   ATTG: Dr Tri Ruiz PCP: Regla Charles MD Date/Time:  2021 1054AM   Primary GI: Dr. Miriam Schmitz:     Acute onset of Hematemesis - 2/2 ulcers in distal esophagus- severe   Acute Blood Loss anemia - hgb 9.3 today s/p 2 units yesterday after hgb 6.7  Melena - red/maroon stool per pt today  Chronic thrombocytopenia - Plt up to 143 from 67  On Warfarin and ASA 81mg - last dose 21PM    - EGD yesterday noted some concern for gastric varices - on CT review does show to have some increase vasculature increasing risk of bleeding     CT abd/pel 21 : 1. No CTA evidence of acute gastrointestinal hemorrhage. 2. Fluid-filled slightly distended distal thoracic esophagus and stomach which  also contains high density material. Moderate fluid distention of the colon. No  bowel obstruction. 3. 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 No splenomegaly. 4. Small pleural effusions and bilateral interstitial/airspace opacities in the  lung bases. See separate CT chest report    EGD 21 : Esophagus:  Full of fresh blood and large clots. Eventually cleared and two ulcers (10 mm) and (5 mm) seen in distal esophagus at 42 cm. Very friable, but no active bleeding. Injected Epi 1 : 10,000 total of 8.5 cc into area around both ulcers. Stomach: Full of huge clots, so only about 20 % of mucosa seen. Clots in stomach are dark and look old. Cannot find pylorus. Duodenum: not entered due to blood obscuring    EGD 2021:  Esophagus:  Upper and mid esophagus appears normal. Distal esophagus looks diffusely ulcerated with large amount of old clot. No active bleeding seen. Stomach: FULL of old blood, large clots. Cannot evaluate gastric mucosa.  Pylorus found and patent. Scope was able to be advanced into duodenum  Duodenum: normal bulb and second portion. No ulcers seen    EGD 21: - Multiple superficial esophageal and gastric ulcers with no sign of recent bleeding. - Large amount of blood clot and clear liquid in the stomach. Suctioned. - Submucosal venous abnormality possibly consistent with a gastric varix/ venous abnormality was seen in the fundus. Two hemoclips placed near these abnormalities to jeane there location for possible fluoroscopic intervention.  - Normal duodenum     Bacteremia - Gram + sepsis with dental abscess  COVID+ -  Admitted 21  ESRD on dialysis  Chronic pain  HTN     Plan:     · Monitor for recurrent bleeding  · No plans for procedures at this time. · Advanced to clear liquids - renal diet  · Continue acid suppression  · Follow CBC - transfuse with blood, plts, ffp as necessary  · Hold all anticoagulation - previously on coumadin  · Symptomatic care per primary team     Plan of care discussed with Dr. Jagruti Chambers. This patient was seen and examined by me in a face-to-face visit today. I reviewed the medical record including lab work, imaging and other provider notes. I confirmed the interval history as described above. I spoke to the patient, discussing our findings and plans. I discussed this case in detail with LISA Richard. I formulated an updated  assessment of this patient and guided our treatment plan. I agree with the above progress note. I agree with the history, exam and assessment and plan as outlined in the note. I would like to add the followinyo M with episodic recurrence of bleeding from esophageal ulcers and apparanet gastric varices as determined by EGD and CT review. Bening abd exam. Stable Hgb. Melena decreasing.   Plan:  1) Monitor for recurrent bleeding, 2) Allow CLD, 3) Serial H/H and transfuse PRN, 4) Will add Octreotide gtt and consider for later addition of LD propranolol   Cesilia Mary MD  Subjective: Discussed with RN events overnight. Up in bathroom bathing. Wants to have something to drink and/or eat. Reports some BRBPR but nursing states it is maroon or darker. Had stomach full of blood yesterday    Complaint Y/N Description   Abdominal Pain n    Hematemesis n    Hematochezia y    Melena n    Constipation n    Diarrhea n    Dyspepsia n    Dysphagia n    Jaundiced n    Nausea/vomiting n      Review of Systems:  Symptom Y/N Comments  Symptom Y/N Comments   Fever/Chills    Chest Pain     Cough    Headaches     Sputum    Joint Pain     SOB/ZAMUDIO    Pruritis/Rash     Tolerating Diet n npo  Other       Could NOT obtain due to:        Objective:   VITALS:   Last 24hrs VS reviewed since prior progress note. Most recent are:  Visit Vitals  /80   Pulse 96   Temp 98.6 °F (37 °C)   Resp 13   Ht 5' 7\" (1.702 m)   Wt 63.4 kg (139 lb 12.8 oz)   SpO2 100%   BMI 21.90 kg/m²       Intake/Output Summary (Last 24 hours) at 12/20/2021 1054  Last data filed at 12/19/2021 1922  Gross per 24 hour   Intake 1112.5 ml   Output 50 ml   Net 1062.5 ml     PHYSICAL EXAM:  General: WD, WN. Alert, cooperative, no acute distress    HEENT: NC, Atraumatic. Lungs:  CTA Bilaterally. No Wheezing/Rhonchi/Rales. Heart:  Regular  rhythm,  No murmur  Abdomen: Soft, Non distended, Non tender. +Bowel sounds, no HSM  Extremities: No c/c/e. Renal fistula. Neurologic:  Alert and oriented X 3. No acute neurological distress   Psych:   Good insight. Not anxious nor agitated. Lab and Radiology Data Reviewed: (see below)    Medications Reviewed: (see below)  PMH/SH reviewed - no change compared to H&P  ________________________________________________________________________  Total time spent with patient: 20 minutes ________________________________________________________________________  Care Plan discussed with:  Patient y   Family     RN karishma Navarro              Consultant:        Luda Vee NP     Procedures: see electronic medical records for all procedures/Xrays and details which were not copied into this note but were reviewed prior to creation of Plan. LABS:  Recent Labs     12/20/21  0604 12/19/21  1139   WBC 7.0 11.7*   HGB 9.3* 6.7*   HCT 29.8* 20.9*   * 124*     Recent Labs     12/20/21  0604 12/19/21  1139 12/18/21  0650    139 133*   K 5.7* 5.0 4.4    104 100   CO2 25 28 23   BUN 68* 44* 65*   CREA 5.89* 4.66* 5.37*   * 91 80   CA 8.4* 7.9* 8.4*   MG 2.7*  --  2.6*   PHOS 5.7*  --   --      Recent Labs     12/20/21  0604 12/18/21  0650   * 148*   TP 6.2* 6.1*   ALB 2.1* 2.0*   GLOB 4.1* 4.1*     Recent Labs     12/19/21  1139   INR 1.2*   PTP 12.3*      No results for input(s): FE, TIBC, PSAT, FERR in the last 72 hours. Lab Results   Component Value Date/Time    Folate 7.5 11/23/2011 11:26 AM     No results for input(s): PH, PCO2, PO2 in the last 72 hours. No results for input(s): CPK, CKMB in the last 72 hours.     No lab exists for component: TROPONINI  Lab Results   Component Value Date/Time    Color RED 11/16/2012 05:15 PM    Appearance OPAQUE 11/16/2012 05:15 PM    Specific gravity 1.020 11/16/2012 05:15 PM    Specific gravity 1.011 09/17/2012 01:35 AM    pH (UA) 8.0 11/16/2012 05:15 PM    Protein >300 (A) 11/16/2012 05:15 PM    Glucose 100 (A) 11/16/2012 05:15 PM    Ketone NEGATIVE  11/16/2012 05:15 PM    Bilirubin NEGATIVE  09/17/2012 01:35 AM    Urobilinogen 0.2 11/16/2012 05:15 PM    Nitrites NEGATIVE  11/16/2012 05:15 PM    Leukocyte Esterase NEGATIVE  11/16/2012 05:15 PM    Epithelial cells 5-10 11/16/2012 05:15 PM    Bacteria 3+ (A) 11/16/2012 05:15 PM    WBC >100 (H) 11/16/2012 05:15 PM    RBC >100 (H) 11/16/2012 05:15 PM       MEDICATIONS:  Current Facility-Administered Medications   Medication Dose Route Frequency    ondansetron (ZOFRAN ODT) tablet 4 mg  4 mg Oral Q6H PRN    0.9% sodium chloride infusion 250 mL  250 mL IntraVENous PRN    metoclopramide HCl (REGLAN) injection 5 mg  5 mg IntraVENous Q12H    pantoprazole (PROTONIX) 40 mg in 0.9% sodium chloride 10 mL injection  40 mg IntraVENous Q12H    morphine injection 1 mg  1 mg IntraVENous Q4H PRN    morphine injection 0.5 mg  0.5 mg IntraVENous Q4H PRN    [Held by provider] metoclopramide HCl (REGLAN) tablet 5 mg  5 mg Oral BID    [Held by provider] pantoprazole (PROTONIX) tablet 40 mg  40 mg Oral ACB&D    calcium carbonate (TUMS) chewable tablet 400 mg [elemental]  400 mg Oral QID PRN    insulin lispro (HUMALOG) injection   SubCUTAneous AC&HS    oxyCODONE IR (ROXICODONE) tablet 5 mg  5 mg Oral Q4H PRN    oxyCODONE IR (ROXICODONE) tablet 10 mg  10 mg Oral Q4H PRN    diphenhydrAMINE (BENADRYL) capsule 25 mg  25 mg Oral QHS PRN    balsam peru-castor oiL (VENELEX) ointment   Topical Q12H    epoetin emilie-epbx (RETACRIT) injection 20,000 Units  20,000 Units SubCUTAneous Q TUE, THU & SAT    glucagon (GLUCAGEN) injection 1 mg  1 mg IntraMUSCular PRN    dextrose (D50W) injection syrg 12.5-25 g  12.5-25 g IntraVENous PRN    simethicone (MYLICON) 88NK/9.3KY oral drops 80 mg  1.2 mL Oral Multiple    [Held by provider] calcium acetate(phosphat bind) (PHOSLO) capsule 1,334 mg  2 Capsule Per NG tube TID WITH MEALS    albuterol-ipratropium (DUO-NEB) 2.5 MG-0.5 MG/3 ML  3 mL Nebulization Q4H PRN    albumin human 25% (BUMINATE) solution 25 g  25 g IntraVENous DIALYSIS PRN    alcohol 62% (NOZIN) nasal  1 Ampule  1 Ampule Topical Q12H    chlorhexidine (PERIDEX) 0.12 % mouthwash 15 mL  15 mL Oral Q12H    acetaminophen (TYLENOL) tablet 650 mg  650 mg Oral Q6H PRN    [Held by provider] L.acidophilus-paracasei-S.thermophil-bifidobacter (RISAQUAD) 8 billion cell capsule  1 Capsule Oral DAILY    sodium chloride (NS) flush 5-40 mL  5-40 mL IntraVENous Q8H    sodium chloride (NS) flush 5-40 mL  5-40 mL IntraVENous PRN    polyethylene glycol (MIRALAX) packet 17 g  17 g Oral DAILY PRN

## 2021-12-20 NOTE — PROGRESS NOTES
MARINO NOTE :     RUR:27%   Disposition: [address] [place]. Therapy rec: Acute Rehab: Referral pending.    -Liaison: Grzegorz Rast: [phone] note in allscripts indicated: Awaiting updated therapy notes. Appears he is being weaned from TPN.       Plan B: Home with [facility]  (they have accepted)          He is ESRD and usually does dialysis at home. He would need to do at  dialysis at [facility]( [phone])  for  ABX if he were to decide to go home with [facility] and not go to rehab. Koby Lee IV therapy complete he may be able to resume home HD 5 days a week.        Follow up appointments:PCP, oral surgeon-Dr Svetlana June, [phone]- scheduled for Dec 23rd @10:30am    DME needed:RW from [place] delivered to patient in his room.     Transportation at [facility] [facility] or means to access home:    family   IM Medicare Letter: Will provide upon d/c   Is patient a BCPI-A Bundle:  N/A            Caregiver Contact:sister, Elizabeth Sandhoff [phone]  Discharge Caregiver contacted prior to discharge? CM to update family as needed.      Initial note:  Chart reviewed.   Rex Shields, [facility]

## 2021-12-20 NOTE — PROGRESS NOTES
Nephrology Progress Note  Blaise Leblanc     [email or web address]  Phone - [phone]   Patient: Martin Corrales    YOB: 1975        Date- 12/20/2021   Admit Date: 11/27/2021  CC: Follow up for ESRD       IMPRESSION & PLAN:   · ESRD-- home HD 5 days per week- Follows up with Dr Yuniel Griggs at [facility] ( now [facility] for IV antibiotics.)  · Covid 19 infection  · Hemorrhagic shock  · Acute blood loss anemia from esophageal bleed from esophagitis  · hyperkalemia  · Left arm swelling- s/p angioplasty of left subclavian vein  · Gram positive sepsis from dental abcess  · s/p lead extraction at [facility]  · Anemia of ckd  · Hx of renal tx in past times 2.  · Hypertension  · CAD  · Failed RTX times 2         H/o DVT, s/p ivc filter/ h/o HIT      PLAN-  · lokelma 10 gram po now  · Low k diet  · NO dialysis TODAY  · Plan for hd on TTS schedule  · epogen for anemia     Subjective: Interval History:   bp stable  No c/o sob,  No c/o chest pain,   No c/o nausea or vomiting, No c/o  fever. Objective:   Vitals:    12/20/21 0020 12/20/21 0200 12/20/21 0400 12/20/21 0850   BP: 121/75 117/63 110/70 112/80   Pulse: (!) 108 99 91 96   Resp: 28 23 19 13   Temp:   99.2 °F (37.3 °C) 98.6 °F (37 °C)   TempSrc:       SpO2: 96% 96% 98% 100%   Weight:       Height:          12/19 0701 - 12/20 0700  In: 1112.5 [I.V.:350]  Out: 50   Last 3 Recorded Weights in this Encounter    12/14/21 0400 12/17/21 0437 12/18/21 0232   Weight: 67.9 kg (149 lb 11.1 oz) 61.5 kg (135 lb 9.6 oz) 63.4 kg (139 lb 12.8 oz)      Physical exam:   GEN: nad  NECK- Supple  RESP: no distress  NEURO:non focal        Seen from outside of the room    Chart reviewed. Pertinent Notes reviewed.      Data Review :  Recent Labs     12/20/21  0604 12/19/21  1139 12/18/21  0650    139 133*   K 5.7* 5.0 4.4    104 100   CO2 25 28 23   BUN 68* 44* 65*   CREA 5.89* 4.66* 5.37*   * 91 80   CA 8.4* 7.9* 8.4*   MG 2.7*  --  2.6*   PHOS 5.7*  --   --      Recent Labs     12/20/21  0604 12/19/21  1139 12/18/21  0650   WBC 7.0 11.7* 8.3   HGB 9.3* 6.7* 9.0*   HCT 29.8* 20.9* 27.7*   * 124* 114*     No results for input(s): FE, TIBC, PSAT, FERR in the last 72 hours.    Medication list  reviewed  Current Facility-Administered Medications   Medication Dose Route Frequency    ondansetron (ZOFRAN ODT) tablet 4 mg  4 mg Oral Q6H PRN    0.9% sodium chloride infusion 250 mL  250 mL IntraVENous PRN    metoclopramide HCl (REGLAN) injection 5 mg  5 mg IntraVENous Q12H    pantoprazole (PROTONIX) 40 mg in 0.9% sodium chloride 10 mL injection  40 mg IntraVENous Q12H    morphine injection 1 mg  1 mg IntraVENous Q4H PRN    morphine injection 0.5 mg  0.5 mg IntraVENous Q4H PRN    [Held by provider] metoclopramide HCl (REGLAN) tablet 5 mg  5 mg Oral BID    [Held by provider] pantoprazole (PROTONIX) tablet 40 mg  40 mg Oral ACB&D    calcium carbonate (TUMS) chewable tablet 400 mg [elemental]  400 mg Oral QID PRN    insulin lispro (HUMALOG) injection   SubCUTAneous AC&HS    oxyCODONE IR (ROXICODONE) tablet 5 mg  5 mg Oral Q4H PRN    oxyCODONE IR (ROXICODONE) tablet 10 mg  10 mg Oral Q4H PRN    diphenhydrAMINE (BENADRYL) capsule 25 mg  25 mg Oral QHS PRN    balsam peru-castor oiL (VENELEX) ointment   Topical Q12H    epoetin emilie-epbx (RETACRIT) injection 20,000 Units  20,000 Units SubCUTAneous Q TUE, THU & SAT    glucagon (GLUCAGEN) injection 1 mg  1 mg IntraMUSCular PRN    dextrose (D50W) injection syrg 12.5-25 g  12.5-25 g IntraVENous PRN    simethicone (MYLICON) 31QX/2.1RC oral drops 80 mg  1.2 mL Oral Multiple    [Held by provider] calcium acetate(phosphat bind) (PHOSLO) capsule 1,334 mg  2 Capsule Per NG tube TID WITH MEALS    albuterol-ipratropium (DUO-NEB) 2.5 MG-0.5 MG/3 ML  3 mL Nebulization Q4H PRN    albumin human 25% (BUMINATE) solution 25 g  25 g IntraVENous DIALYSIS PRN    alcohol 62% (NOZIN) nasal  1 Ampule  1 Ampule Topical Q12H    chlorhexidine (PERIDEX) 0.12 % mouthwash 15 mL  15 mL Oral Q12H    acetaminophen (TYLENOL) tablet 650 mg  650 mg Oral Q6H PRN    [Held by provider] L.acidophilus-paracasei-S.thermophil-bifidobacter (RISAQUAD) 8 billion cell capsule  1 Capsule Oral DAILY    sodium chloride (NS) flush 5-40 mL  5-40 mL IntraVENous Q8H    sodium chloride (NS) flush 5-40 mL  5-40 mL IntraVENous PRN    polyethylene glycol (MIRALAX) packet 17 g  17 g Oral DAILY PRN          Concetta Rubi MD              [address] [facility]  [facility] / [facility] [address]  [place], [address]  Phone - [phone]               Fax - [phone]

## 2021-12-20 NOTE — PERIOP NOTES
TRANSFER - OUT REPORT:    Verbal report given to GREGORY Randolph RN (name) on Edu Carbajal  being transferred to [facility](unit) for routine post - op       Report consisted of patients Situation, Background, Assessment and   Recommendations(SBAR). Information from the following report(s) SBAR, Kardex, OR Summary, Procedure Summary, Intake/Output, MAR, Recent Results and Cardiac Rhythm ST was reviewed with the receiving nurse. Opportunity for questions and clarification was provided.       Patient transported with:   Monitor  O2 @ 2 liters  Registered Nurse   Premedicated with benadryl 50 mg and solucortef 200mg IV for CT abd, spoke with Tito Crenshaw in CT, CT will be done in 1 hr, relayed this to [address] [place] [address]

## 2021-12-20 NOTE — PERIOP NOTES
Handoff Report from Operating Room to PACU    Report received from Earle Leung  and Og Jackman CRNA regarding Yahaira Lewis. Surgeon(s):  Daniela Akhtar MD  And Procedure(s) (LRB):  ESOPHAGOGASTRODUODENOSCOPY (EGD) WITH RESOLUTION CLIP  (N/A)  RESOLUTION CLIP (N/A)  confirmed   with allergies and dressings discussed. Anesthesia type, drugs, patient history, complications, estimated blood loss, vital signs, intake and output, and last pain medication were reviewed.

## 2021-12-20 NOTE — ANESTHESIA POSTPROCEDURE EVALUATION
Procedure(s):  ESOPHAGOGASTRODUODENOSCOPY (EGD) WITH RESOLUTION CLIP   RESOLUTION CLIP. MAC    Anesthesia Post Evaluation      Multimodal analgesia: multimodal analgesia used between 6 hours prior to anesthesia start to PACU discharge  Patient location during evaluation: PACU  Patient participation: complete - patient participated  Level of consciousness: awake and alert  Pain management: adequate  Airway patency: patent  Anesthetic complications: no  Cardiovascular status: acceptable, hemodynamically stable and blood pressure returned to baseline  Respiratory status: acceptable and room air  Hydration status: euvolemic  Post anesthesia nausea and vomiting:  none  Final Post Anesthesia Temperature Assessment:  Normothermia (36.0-37.5 degrees C)      INITIAL Post-op Vital signs:   Vitals Value Taken Time   /68 12/19/21 2000   Temp 37.2 °C (98.9 °F) 12/19/21 1952   Pulse 116 12/19/21 2001   Resp 25 12/19/21 2001   SpO2 99 % 12/19/21 2001   Vitals shown include unvalidated device data.

## 2021-12-20 NOTE — PROCEDURES
[address]  [place], [address]        Esophagogastroduodenoscopy (EGD) Procedure Note    Ermelinda Landrum  1975  [id number]      Procedure: Esophagogastroduodenoscopy    Indication: Upper GI bleeding    Pre-operative Diagnosis: see indication above    Post-operative Diagnosis: see findings below    : Lucio Britt MD    Referring Provider:  Amador Vincent MD      Anesthesia/Sedation: MAC        Procedure Details     After informed consent was obtained for the procedure, with all risks and benefits of procedure explained the patient was taken to the endoscopy suite and placed in the left lateral decubitus position. Following sequential administration of sedation as per above, the endoscope was inserted into the mouth and advanced under direct vision to second portion of the duodenum. A careful inspection was made as the gastroscope was withdrawn, including a retroflexed view of the proximal stomach; findings and interventions are described below. Findings:   Esophagus: Esophagus slightly dilated and initially was partially full of liquid. This was suctioned without difficulty. GEJ ulcer was seen but no evidence of recent blood loss was visible there. No evidence of esophageal varices. Hill grade IV valve- GEJ widely open. Stomach: Stomach was full of a mixture of clear viscous fluid and formed blood clot. There was not very much actual red blood to be seen. The fluid component was suctioned without event. There were multiple serpenginous superficial gastric ulcers in the body of the stomach. I did not see any evidence of recent bleeding from any of these relatively superficial ulcers. After multiple position changes I was able to get a good look at the fundus, which was remarkable for a focus of sub-epithelial protuberances that appeared to be spontaneously oozing a small volume of blood.  The location of these vessels are similar to what we see in cirrhotics with Isolated fundic varices (Kuldip IGV1); however, these appeared somewhat decompressed and there was no clear focus of recent hemorrhage. The protuberances themselves were slowly oozing in the absence of any overlying arteriovenous malformations. Given the relatively slow nature of his bleeding, I feel there is a good chance that this is the focus of bleeding. I do not believe this is a process readily amenable to endoscopic therapy at this time. As such, I elected to place two hemoclips to jeane the site/ extent of what appeared to be the culprit. Duodenum/jejunum: Normal duodenum    Therapies: Hemoclips were placed for marking. Not for hemostasis    Specimens:None         EBL: None      Complications:   None; patient tolerated the procedure well. Impression:    - Multiple superficial esophageal and gastric ulcers with no sign of recent bleeding.  - Large amount of blood clot and clear liquid in the stomach. Suctioned. - Submucosal venous abnormality possibly consistent with a gastric varix/ venous abnormality was seen in the fundus. Two hemoclips placed near these abnormalities to jeane there location for possible fluoroscopic intervention.    - Normal duodenum    Recommendations:  1) Gave 200 mg Solucortef and 50 mg Diphenhydramine   2) Discussed change in protocol with CT- will do 3 phase CT in hopes of getting a better look at venous anatomy in addition to arterial anatomy. 3) NPO for now  4) IR consult in am if appropriate based on imaging findings. Although this bleeding is not brisk, this is not something that looks like it is going to stop bleeding on its own.        Signed By: Mitchell Ching MD     12/19/2021  7:15 PM

## 2021-12-20 NOTE — PROGRESS NOTES
Problem: Self Care Deficits Care Plan (Adult)  Goal: *Acute Goals and Plan of Care (Insert Text)  Description:   FUNCTIONAL STATUS PRIOR TO ADMISSION: Patient was independent and active without use of DME. Pt reports doing his own home HD.    HOME SUPPORT: The patient lived with sister but did not require assist.    Occupational Therapy Goals  Initiated 11/28/2021; Re-evaluated 12/15/2021 Goals updated below  1. Patient will perform grooming standing sinkside with CGA/minimal assist within 7 day(s). 2.  Patient will perform bathing in supported sitting with supervision/set-up within 7 day(s). UPGRADE: independence  3. Patient will perform lower body dressing with minimal assistance/contact guard assist within 7 day(s). UPGRADE: independence  4. Patient will perform toilet transfers with minimal assistance/contact guard assist within 7 day(s). UPGRADE: independence  5. Patient will perform all aspects of toileting with supervision/set-up within 7 day(s). UPGRADE: independence  6. Patient will participate in upper extremity therapeutic exercise/activities with supervision/set-up for 15 minutes within 7 day(s). 7.  Patient will utilize energy conservation techniques during functional activities with verbal cues within 7 day(s). Outcome: Progressing Towards Goal   OCCUPATIONAL THERAPY TREATMENT  Patient: Stef Carty (31 y.o. male)  Date: 12/20/2021  Diagnosis: Acute COVID-19 [U07.1] <principal problem not specified>  Procedure(s) (LRB):  ESOPHAGOGASTRODUODENOSCOPY (EGD) WITH RESOLUTION CLIP  (N/A)  RESOLUTION CLIP (N/A) 1 Day Post-Op  Precautions: Contact,Fall,Skin (COVID)  Chart, occupational therapy assessment, plan of care, and goals were reviewed. ASSESSMENT  Patient continues with skilled OT services and is progressing towards goals, monica's improvement in activity tolerance and functional transfers this session. Pt received seated in chair, agreeable to participate.  Pt ambulated in room and hallway initially with RW and supervision, then able to ambulate in room on return trip without AD, no LOB observed. He performed toilet transfer and standing grooming ADL with supervision before returning to chair. VSS throughout. Pt is progressing well towards goals, will continue to follow. Current Level of Function Impacting Discharge (ADLs): Supervision transfers, independent-supervision UB ADLs    Other factors to consider for discharge: independent baseline         PLAN :  Patient continues to benefit from skilled intervention to address the above impairments. Continue treatment per established plan of care to address goals. Recommendation for discharge: (in order for the patient to meet his/her long term goals)  Occupational therapy at least 2 days/week in the home pending continued progress    This discharge recommendation:  Has not yet been discussed the attending provider and/or case management    IF patient discharges home will need the following DME: rolling walker       SUBJECTIVE:   Patient stated I'm not sure if I need to go to rehab.     OBJECTIVE DATA SUMMARY:   Cognitive/Behavioral Status:  Neurologic State: Alert  Orientation Level: Oriented X4  Cognition: Follows commands  Perception: Appears intact  Perseveration: No perseveration noted  Safety/Judgement: Awareness of environment    Functional Mobility and Transfers for ADLs:  Bed Mobility:   Received OOB in chair    Transfers:  Sit to Stand: Supervision  Functional Transfers  Toilet Transfer : Supervision       Balance:  Sitting: Intact  Standing: Impaired  Standing - Static: Good  Standing - Dynamic : Fair    ADL Intervention:       Grooming  Position Performed: Standing  Washing Hands: Supervision      Cognitive Retraining  Safety/Judgement: Awareness of environment    Pain:  Pt did not c/o pain    Activity Tolerance:   Good    After treatment patient left in no apparent distress:   Sitting in chair and Call bell within reach    COMMUNICATION/COLLABORATION:   The patients plan of care was discussed with: Physical therapist and Registered nurse.      Olga Lehman OT  Time Calculation: 24 mins

## 2021-12-21 LAB
ALBUMIN SERPL-MCNC: 2.2 G/DL (ref 3.5–5)
ALBUMIN/GLOB SERPL: 0.6 {RATIO} (ref 1.1–2.2)
ALP SERPL-CCNC: 128 U/L (ref 45–117)
ALT SERPL-CCNC: 33 U/L (ref 12–78)
ANION GAP SERPL CALC-SCNC: 9 MMOL/L (ref 5–15)
AST SERPL-CCNC: 29 U/L (ref 15–37)
BASOPHILS # BLD: 0.2 K/UL (ref 0–0.1)
BASOPHILS NFR BLD: 3 % (ref 0–1)
BILIRUB SERPL-MCNC: 0.5 MG/DL (ref 0.2–1)
BUN SERPL-MCNC: 78 MG/DL (ref 6–20)
BUN/CREAT SERPL: 11 (ref 12–20)
CALCIUM SERPL-MCNC: 8 MG/DL (ref 8.5–10.1)
CHLORIDE SERPL-SCNC: 104 MMOL/L (ref 97–108)
CO2 SERPL-SCNC: 26 MMOL/L (ref 21–32)
CREAT SERPL-MCNC: 7.02 MG/DL (ref 0.7–1.3)
DIFFERENTIAL METHOD BLD: ABNORMAL
EOSINOPHIL # BLD: 0.1 K/UL (ref 0–0.4)
EOSINOPHIL NFR BLD: 1 % (ref 0–7)
ERYTHROCYTE [DISTWIDTH] IN BLOOD BY AUTOMATED COUNT: 19.3 % (ref 11.5–14.5)
GLOBULIN SER CALC-MCNC: 3.9 G/DL (ref 2–4)
GLUCOSE BLD STRIP.AUTO-MCNC: 108 MG/DL (ref 65–117)
GLUCOSE BLD STRIP.AUTO-MCNC: 149 MG/DL (ref 65–117)
GLUCOSE BLD STRIP.AUTO-MCNC: 72 MG/DL (ref 65–117)
GLUCOSE BLD STRIP.AUTO-MCNC: 86 MG/DL (ref 65–117)
GLUCOSE SERPL-MCNC: 141 MG/DL (ref 65–100)
HCT VFR BLD AUTO: 26.4 % (ref 36.6–50.3)
HGB BLD-MCNC: 8.4 G/DL (ref 12.1–17)
IMM GRANULOCYTES # BLD AUTO: 0 K/UL (ref 0–0.04)
IMM GRANULOCYTES NFR BLD AUTO: 0 % (ref 0–0.5)
LYMPHOCYTES # BLD: 1 K/UL (ref 0.8–3.5)
LYMPHOCYTES NFR BLD: 17 % (ref 12–49)
MAGNESIUM SERPL-MCNC: 2.4 MG/DL (ref 1.6–2.4)
MCH RBC QN AUTO: 29.7 PG (ref 26–34)
MCHC RBC AUTO-ENTMCNC: 31.8 G/DL (ref 30–36.5)
MCV RBC AUTO: 93.3 FL (ref 80–99)
MONOCYTES # BLD: 0.4 K/UL (ref 0–1)
MONOCYTES NFR BLD: 7 % (ref 5–13)
MYELOCYTES NFR BLD MANUAL: 2 %
NEUTS SEG # BLD: 4.2 K/UL (ref 1.8–8)
NEUTS SEG NFR BLD: 70 % (ref 32–75)
NRBC # BLD: 0.02 K/UL (ref 0–0.01)
NRBC BLD-RTO: 0.3 PER 100 WBC
PHOSPHATE SERPL-MCNC: 5.6 MG/DL (ref 2.6–4.7)
PLATELET # BLD AUTO: 157 K/UL (ref 150–400)
PMV BLD AUTO: 11.4 FL (ref 8.9–12.9)
POTASSIUM SERPL-SCNC: 5 MMOL/L (ref 3.5–5.1)
PROT SERPL-MCNC: 6.1 G/DL (ref 6.4–8.2)
RBC # BLD AUTO: 2.83 M/UL (ref 4.1–5.7)
RBC MORPH BLD: ABNORMAL
SERVICE CMNT-IMP: ABNORMAL
SERVICE CMNT-IMP: NORMAL
SODIUM SERPL-SCNC: 139 MMOL/L (ref 136–145)
WBC # BLD AUTO: 6 K/UL (ref 4.1–11.1)

## 2021-12-21 PROCEDURE — 74011250636 HC RX REV CODE- 250/636: Performed by: GENERAL ACUTE CARE HOSPITAL

## 2021-12-21 PROCEDURE — 74011636637 HC RX REV CODE- 636/637: Performed by: GENERAL ACUTE CARE HOSPITAL

## 2021-12-21 PROCEDURE — 90935 HEMODIALYSIS ONE EVALUATION: CPT

## 2021-12-21 PROCEDURE — 65660000000 HC RM CCU STEPDOWN

## 2021-12-21 PROCEDURE — 74011000250 HC RX REV CODE- 250: Performed by: INTERNAL MEDICINE

## 2021-12-21 PROCEDURE — 74011000250 HC RX REV CODE- 250: Performed by: GENERAL ACUTE CARE HOSPITAL

## 2021-12-21 PROCEDURE — 74011250636 HC RX REV CODE- 250/636: Performed by: INTERNAL MEDICINE

## 2021-12-21 PROCEDURE — 74011250637 HC RX REV CODE- 250/637: Performed by: INTERNAL MEDICINE

## 2021-12-21 PROCEDURE — C9113 INJ PANTOPRAZOLE SODIUM, VIA: HCPCS | Performed by: GENERAL ACUTE CARE HOSPITAL

## 2021-12-21 PROCEDURE — 74011250637 HC RX REV CODE- 250/637: Performed by: EMERGENCY MEDICINE

## 2021-12-21 PROCEDURE — 83735 ASSAY OF MAGNESIUM: CPT

## 2021-12-21 PROCEDURE — 82962 GLUCOSE BLOOD TEST: CPT

## 2021-12-21 PROCEDURE — 36415 COLL VENOUS BLD VENIPUNCTURE: CPT

## 2021-12-21 PROCEDURE — 74011636637 HC RX REV CODE- 636/637: Performed by: INTERNAL MEDICINE

## 2021-12-21 PROCEDURE — 85025 COMPLETE CBC W/AUTO DIFF WBC: CPT

## 2021-12-21 PROCEDURE — 97116 GAIT TRAINING THERAPY: CPT

## 2021-12-21 PROCEDURE — 84100 ASSAY OF PHOSPHORUS: CPT

## 2021-12-21 PROCEDURE — 80053 COMPREHEN METABOLIC PANEL: CPT

## 2021-12-21 RX ORDER — NADOLOL 40 MG/1
20 TABLET ORAL DAILY
Status: DISCONTINUED | OUTPATIENT
Start: 2021-12-22 | End: 2021-12-24 | Stop reason: HOSPADM

## 2021-12-21 RX ADMIN — OCTREOTIDE ACETATE 25 MCG/HR: 500 INJECTION, SOLUTION INTRAVENOUS; SUBCUTANEOUS at 12:47

## 2021-12-21 RX ADMIN — CHLORHEXIDINE GLUCONATE 15 ML: 1.2 RINSE ORAL at 11:53

## 2021-12-21 RX ADMIN — MORPHINE SULFATE 0.5 MG: 2 INJECTION, SOLUTION INTRAMUSCULAR; INTRAVENOUS at 07:32

## 2021-12-21 RX ADMIN — Medication 10 ML: at 06:00

## 2021-12-21 RX ADMIN — Medication 1 AMPULE: at 09:00

## 2021-12-21 RX ADMIN — MORPHINE SULFATE 1 MG: 2 INJECTION, SOLUTION INTRAMUSCULAR; INTRAVENOUS at 22:14

## 2021-12-21 RX ADMIN — Medication 10 ML: at 22:18

## 2021-12-21 RX ADMIN — SODIUM CHLORIDE 40 MG: 9 INJECTION, SOLUTION INTRAMUSCULAR; INTRAVENOUS; SUBCUTANEOUS at 11:53

## 2021-12-21 RX ADMIN — MORPHINE SULFATE 0.5 MG: 2 INJECTION, SOLUTION INTRAMUSCULAR; INTRAVENOUS at 11:53

## 2021-12-21 RX ADMIN — Medication: at 09:00

## 2021-12-21 RX ADMIN — METOCLOPRAMIDE 5 MG: 5 INJECTION, SOLUTION INTRAMUSCULAR; INTRAVENOUS at 11:54

## 2021-12-21 RX ADMIN — Medication 1 AMPULE: at 22:17

## 2021-12-21 RX ADMIN — Medication 2 UNITS: at 16:30

## 2021-12-21 RX ADMIN — SODIUM CHLORIDE 40 MG: 9 INJECTION, SOLUTION INTRAMUSCULAR; INTRAVENOUS; SUBCUTANEOUS at 22:13

## 2021-12-21 RX ADMIN — CHLORHEXIDINE GLUCONATE 15 ML: 1.2 RINSE ORAL at 22:18

## 2021-12-21 RX ADMIN — MORPHINE SULFATE 1 MG: 2 INJECTION, SOLUTION INTRAMUSCULAR; INTRAVENOUS at 17:15

## 2021-12-21 RX ADMIN — Medication 10 ML: at 15:05

## 2021-12-21 RX ADMIN — EPOETIN ALFA-EPBX 20000 UNITS: 20000 INJECTION, SOLUTION INTRAVENOUS; SUBCUTANEOUS at 22:13

## 2021-12-21 RX ADMIN — ACETAMINOPHEN 650 MG: 325 TABLET ORAL at 15:05

## 2021-12-21 RX ADMIN — METOCLOPRAMIDE 5 MG: 5 INJECTION, SOLUTION INTRAMUSCULAR; INTRAVENOUS at 22:13

## 2021-12-21 RX ADMIN — MORPHINE SULFATE 0.5 MG: 2 INJECTION, SOLUTION INTRAMUSCULAR; INTRAVENOUS at 02:51

## 2021-12-21 RX ADMIN — Medication: at 22:18

## 2021-12-21 NOTE — PROGRESS NOTES
GI PROGRESS NOTE  Jose Antonio Castellanos NP  [phone] NP in-hospital cell phone M-F until 4:30  After 5pm or on weekends, please call  for physician on call    NAME:Claude Redmond Horner :1975 LJU:[id number]   ATTG: Dr Skyla William PCP: Genaro Gomez MD Date/Time:  2021 [id number]  Primary GI: Dr. Horton Isabelle:     Acute onset of Hematemesis - 2/2 ulcers in distal esophagus- severe   Gastric varices on EGD and CT scan  Acute Blood Loss anemia - hgb 8.4 today from 9.3 yesterday, previously down to 6.7  Melena - no more today, 2 episodes of yesterday  Chronic thrombocytopenia - 157 today - stabalizing  On Warfarin and ASA 81mg - last dose 21PM    - EGD 21 noted some concern for gastric varices - on CT review does show to have some increase vasculature increasing risk of bleeding noting gastric varices  - started octreotide gtt 21    CT abd/pel 21 : 1. No CTA evidence of acute gastrointestinal hemorrhage. 2. Fluid-filled slightly distended distal thoracic esophagus and stomach which  also contains high density material. Moderate fluid distention of the colon. No  bowel obstruction. 3. 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 No splenomegaly. 4. Small pleural effusions and bilateral interstitial/airspace opacities in the  lung bases. See separate CT chest report    EGD 21 : Esophagus:  Full of fresh blood and large clots. Eventually cleared and two ulcers (10 mm) and (5 mm) seen in distal esophagus at 42 cm. Very friable, but no active bleeding. Injected Epi 1 : 10,000 total of 8.5 cc into area around both ulcers. Stomach: Full of huge clots, so only about 20 % of mucosa seen. Clots in stomach are dark and look old. Cannot find pylorus. Duodenum: not entered due to blood obscuring    EGD 2021:  Esophagus:  Upper and mid esophagus appears normal. Distal esophagus looks diffusely ulcerated with large amount of old clot.  No active bleeding seen. Stomach: FULL of old blood, large clots. Cannot evaluate gastric mucosa. Pylorus found and patent. Scope was able to be advanced into duodenum  Duodenum: normal bulb and second portion. No ulcers seen    EGD 21: - Multiple superficial esophageal and gastric ulcers with no sign of recent bleeding. - Large amount of blood clot and clear liquid in the stomach. Suctioned. - Submucosal venous abnormality possibly consistent with a gastric varix/ venous abnormality was seen in the fundus. Two hemoclips placed near these abnormalities to jeane there location for possible fluoroscopic intervention.  - Normal duodenum     Bacteremia - Gram + sepsis with dental abscess  COVID+ -  Admitted 21  ESRD on dialysis  Chronic pain  HTN     Plan:     Continue octreotide gtt  Added nadolol 20mg daily  Maintain clear liquid diet today, may be able to advance tomorrow pending hgb  Continue acid suppression  BID  Follow CBC - transfuse with blood, plts, ffp as necessary  Hold all anticoagulation  Monitor for recurrent bleeding. Symptomatic care per primary team     Plan of care discussed with Dr. Michelle Kim. This patient was seen and examined by me in a face-to-face visit today. I reviewed the medical record including lab work, imaging and other provider notes. I confirmed the interval history as described above. I spoke to the patient, discussing our findings and plans. I discussed this case in detail with LISA Richard. I formulated an updated  assessment of this patient and guided our treatment plan. I agree with the above progress note. I agree with the history, exam and assessment and plan as outlined in the note. I would like to add the followinyo M with episodic recurrence of bleeding from esophageal ulcers and apparanet gastric varices as determined by EGD and CT review. Bening abd exam. Stable Hgb. Melena decreasing.   Plan:  1) Monitor for recurrent bleeding, 2) Allow CLD and will consider advancing tomorrow, 3) Serial H/H and transfuse PRN, 4) Will continue Octreotide gtt , 5) Start nadolol every day at low dose    Reyes Points, MD  Subjective:   Discussed with RN events overnight. Doing well overall today. No more blood in stools. Getting dialysis now. Pt had many questions about long term decision over anticoagulation with his risk of bleeding vs risk of clotting. Complaint Y/N Description   Abdominal Pain n    Hematemesis n    Hematochezia  None today   Melena n    Constipation n    Diarrhea n    Dyspepsia n    Dysphagia n    Jaundiced n    Nausea/vomiting n      Review of Systems:  Symptom Y/N Comments  Symptom Y/N Comments   Fever/Chills    Chest Pain     Cough    Headaches     Sputum    Joint Pain     SOB/ZAMUDIO    Pruritis/Rash     Tolerating Diet y clears  Other       Could NOT obtain due to:        Objective:   VITALS:   Last 24hrs VS reviewed since prior progress note. Most recent are:  Visit Vitals  /88   Pulse 83   Temp 97.6 °F (36.4 °C) (Oral)   Resp 16   Ht 5' 7\" (1.702 m)   Wt 61.3 kg (135 lb 1.6 oz)   SpO2 100%   BMI 21.16 kg/m²       Intake/Output Summary (Last 24 hours) at 12/21/2021 0950  Last data filed at 12/20/2021 2000  Gross per 24 hour   Intake 63.75 ml   Output --   Net 63.75 ml     PHYSICAL EXAM:  General: WD, WN. Alert, cooperative, no acute distress    HEENT: NC, Atraumatic. Lungs:  CTA Bilaterally. No Wheezing/Rhonchi/Rales. Heart:  Regular  rhythm,  No murmur  Abdomen:  Soft, Non distended, Non tender. +Bowel sounds, no HSM  Extremities: No c/c/e. Renal fistula. Neurologic:  Alert and oriented X 3. No acute neurological distress   Psych:   Good insight. Not anxious nor agitated.       Lab and Radiology Data Reviewed: (see below)    Medications Reviewed: (see below)  PMH/SH reviewed - no change compared to H&P  ________________________________________________________________________  Total time spent with patient: 20 minutes ________________________________________________________________________  Care Plan discussed with:  Patient y   Family     RN y - dialysis RN at bedside              Consultant: Justus Willett NP     Procedures: see electronic medical records for all procedures/Xrays and details which were not copied into this note but were reviewed prior to creation of Plan. LABS:  Recent Labs     12/21/21 0237 12/20/21  0604   WBC 6.0 7.0   HGB 8.4* 9.3*   HCT 26.4* 29.8*    143*     Recent Labs     12/21/21 0237 12/20/21  0604 12/19/21  1139    140 139   K 5.0 5.7* 5.0    105 104   CO2 26 25 28   BUN 78* 68* 44*   CREA 7.02* 5.89* 4.66*   * 119* 91   CA 8.0* 8.4* 7.9*   MG 2.4 2.7*  --    PHOS 5.6* 5.7*  --      Recent Labs     12/21/21 0237 12/20/21  0604   * 136*   TP 6.1* 6.2*   ALB 2.2* 2.1*   GLOB 3.9 4.1*     Recent Labs     12/19/21  1139   INR 1.2*   PTP 12.3*      No results for input(s): FE, TIBC, PSAT, FERR in the last 72 hours. Lab Results   Component Value Date/Time    Folate 7.5 11/23/2011 11:26 AM     No results for input(s): PH, PCO2, PO2 in the last 72 hours. No results for input(s): CPK, CKMB in the last 72 hours.     No lab exists for component: TROPONINI  Lab Results   Component Value Date/Time    Color RED 11/16/2012 05:15 PM    Appearance OPAQUE 11/16/2012 05:15 PM    Specific gravity 1.020 11/16/2012 05:15 PM    Specific gravity 1.011 09/17/2012 01:35 AM    pH (UA) 8.0 11/16/2012 05:15 PM    Protein >300 (A) 11/16/2012 05:15 PM    Glucose 100 (A) 11/16/2012 05:15 PM    Ketone NEGATIVE  11/16/2012 05:15 PM    Bilirubin NEGATIVE  09/17/2012 01:35 AM    Urobilinogen 0.2 11/16/2012 05:15 PM    Nitrites NEGATIVE  11/16/2012 05:15 PM    Leukocyte Esterase NEGATIVE  11/16/2012 05:15 PM    Epithelial cells 5-10 11/16/2012 05:15 PM    Bacteria 3+ (A) 11/16/2012 05:15 PM    WBC >100 (H) 11/16/2012 05:15 PM    RBC >100 (H) 11/16/2012 05:15 PM MEDICATIONS:  Current Facility-Administered Medications   Medication Dose Route Frequency    octreotide (SANDOSTATIN) 500 mcg in 0.9% sodium chloride 500 mL infusion  25 mcg/hr IntraVENous CONTINUOUS    ondansetron (ZOFRAN ODT) tablet 4 mg  4 mg Oral Q6H PRN    0.9% sodium chloride infusion 250 mL  250 mL IntraVENous PRN    metoclopramide HCl (REGLAN) injection 5 mg  5 mg IntraVENous Q12H    pantoprazole (PROTONIX) 40 mg in 0.9% sodium chloride 10 mL injection  40 mg IntraVENous Q12H    morphine injection 1 mg  1 mg IntraVENous Q4H PRN    morphine injection 0.5 mg  0.5 mg IntraVENous Q4H PRN    [Held by provider] metoclopramide HCl (REGLAN) tablet 5 mg  5 mg Oral BID    [Held by provider] pantoprazole (PROTONIX) tablet 40 mg  40 mg Oral ACB&D    calcium carbonate (TUMS) chewable tablet 400 mg [elemental]  400 mg Oral QID PRN    insulin lispro (HUMALOG) injection   SubCUTAneous AC&HS    oxyCODONE IR (ROXICODONE) tablet 5 mg  5 mg Oral Q4H PRN    oxyCODONE IR (ROXICODONE) tablet 10 mg  10 mg Oral Q4H PRN    diphenhydrAMINE (BENADRYL) capsule 25 mg  25 mg Oral QHS PRN    balsam peru-castor oiL (VENELEX) ointment   Topical Q12H    epoetin emilie-epbx (RETACRIT) injection 20,000 Units  20,000 Units SubCUTAneous Q TUE, THU & SAT    glucagon (GLUCAGEN) injection 1 mg  1 mg IntraMUSCular PRN    dextrose (D50W) injection syrg 12.5-25 g  12.5-25 g IntraVENous PRN    simethicone (MYLICON) 17WK/4.3VJ oral drops 80 mg  1.2 mL Oral Multiple    [Held by provider] calcium acetate(phosphat bind) (PHOSLO) capsule 1,334 mg  2 Capsule Per NG tube TID WITH MEALS    albuterol-ipratropium (DUO-NEB) 2.5 MG-0.5 MG/3 ML  3 mL Nebulization Q4H PRN    albumin human 25% (BUMINATE) solution 25 g  25 g IntraVENous DIALYSIS PRN    alcohol 62% (NOZIN) nasal  1 Ampule  1 Ampule Topical Q12H    chlorhexidine (PERIDEX) 0.12 % mouthwash 15 mL  15 mL Oral Q12H    acetaminophen (TYLENOL) tablet 650 mg  650 mg Oral Q6H PRN    [Held by provider] L.acidophilus-paracasei-S.thermophil-bifidobacter (RISAQUAD) 8 billion cell capsule  1 Capsule Oral DAILY    sodium chloride (NS) flush 5-40 mL  5-40 mL IntraVENous Q8H    sodium chloride (NS) flush 5-40 mL  5-40 mL IntraVENous PRN    polyethylene glycol (MIRALAX) packet 17 g  17 g Oral DAILY PRN

## 2021-12-21 NOTE — PROGRESS NOTES
TRANSFER - OUT REPORT:    Verbal report given to Bulmaro Cuellar on Traci Villafana  being transferred to PCU(unit) for ordered procedure       Report consisted of patients Situation, Background, Assessment and   Recommendations(SBAR). Information from the following report(s) Kardex was reviewed with the receiving nurse. Opportunity for questions and clarification was provided.       Patient transported with:   HD at bedside

## 2021-12-21 NOTE — PROGRESS NOTES
Problem: Mobility Impaired (Adult and Pediatric)  Goal: *Acute Goals and Plan of Care (Insert Text)  Description: FUNCTIONAL STATUS PRIOR TO ADMISSION: Patient was independent and active without use of DME.    HOME SUPPORT PRIOR TO ADMISSION: The patient lived with sister. Physical Therapy Goals  Initiated 11/30/2021  1. Patient will move from supine to sit and sit to supine  in bed with independence within 7 day(s). 2.  Patient will transfer from bed to chair and chair to bed with modified independence using the least restrictive device within 7 day(s). 3.  Patient will perform sit to stand with modified independence within 7 day(s). 4.  Patient will ambulate with modified independence for 100 feet with the least restrictive device within 7 day(s). Reviewed 12/15- goals remain appropriate. Outcome: Progressing Towards Goal   PHYSICAL THERAPY TREATMENT  Patient: Sanjuana Velazco (30 y.o. male)  Date: 12/21/2021  Diagnosis: Acute COVID-19 [U07.1] <principal problem not specified>  Procedure(s) (LRB):  ESOPHAGOGASTRODUODENOSCOPY (EGD) WITH RESOLUTION CLIP  (N/A)  RESOLUTION CLIP (N/A) 2 Days Post-Op  Precautions: Contact,Fall,Skin (COVID)  Chart, physical therapy assessment, plan of care and goals were reviewed. ASSESSMENT  Patient continues with skilled PT services and is progressing towards goals. RN cleared pt for session. Pt is able to increase ambulation distance and address noted stair need, for continued aspiration to return to home at D/c. Pt is mobilizing short distances without AD, but uses IV pole to ambulate with on this session (actually provided resistance and obstacle). Py is safe to ind amb in esparza. Will cont to assess, depending on date of actual D/c. Home with supervision/assist     Current Level of Function Impacting Discharge (mobility/balance):  Mod I    Other factors to consider for discharge:          PLAN :  Patient continues to benefit from skilled intervention to address the above impairments. Continue treatment per established plan of care. to address goals. Recommendation for discharge: (in order for the patient to meet his/her long term goals)  Physical therapy at least 2 days/week in the home     This discharge recommendation:  Has been made in collaboration with the attending provider and/or case management    IF patient discharges home will need the following DME: rolling walker       SUBJECTIVE:   Patient stated I'm still here. Let's see if we can go further.     OBJECTIVE DATA SUMMARY:   Critical Behavior:  Neurologic State: Alert  Orientation Level: Oriented X4  Cognition: Appropriate decision making,Appropriate for age attention/concentration,Appropriate safety awareness  Safety/Judgement: Awareness of environment  Functional Mobility Training:  Transfers:  Sit to Stand: Modified independent  Stand to Sit: Modified independent  Balance:  Sitting: Intact  Standing: Intact  Standing - Static: Good  Standing - Dynamic : Good  Ambulation/Gait Training:  Distance (ft): 400 Feet (ft)  Assistive Device: Gait belt (IV Pole)  Ambulation - Level of Assistance: Supervision  Gait Abnormalities: Decreased step clearance;Shuffling gait  Base of Support: Narrowed  Speed/Bernarda: Slow  Step Length: Left shortened;Right shortened  Stairs:  Number of Stairs Trained: 4  Stairs - Level of Assistance: Supervision   Rail Use: Right   Pain Rating:  Pain in: 0  Pain out: 0    Activity Tolerance:   Good    After treatment patient left in no apparent distress:   Sitting in chair and Call bell within reach    COMMUNICATION/COLLABORATION:   The patients plan of care was discussed with: Registered nurse and Case management.      Hemant Roberts PTA   Time Calculation: 30 mins

## 2021-12-21 NOTE — PROGRESS NOTES
9:25 AM Received phone call from infection control nurse stating that contact isolation precautions is not indicated. Dr. Nelson Presumkendall notified of the above and gave TORB to dc contact isolation orders. Primary RN, Yoana Jones, alycia.

## 2021-12-21 NOTE — PROGRESS NOTES
Nephrology Progress Note  Blaise Leblanc     [facility]. [facility]  Phone - [phone]   Patient: Cresencio Turpin    YOB: 1975        Date- 12/21/2021   Admit Date: 11/27/2021  CC: Follow up for  esrd      IMPRESSION & PLAN:   · ESRD-- home HD 5 days per week- Follows up with Dr Norah Nolen at [facility] ( now [facility] HD for IV antibiotics.)  · Covid 19 infection  · Hemorrhagic shock  · Acute blood loss anemia from esophageal bleed from esophagitis  · hyperkalemia  · Left arm swelling- s/p angioplasty of left subclavian vein  · Gram positive sepsis from dental abcess  · s/p lead extraction at [facility]  · Anemia of ckd  · Hx of renal tx in past times 2.  · Hypertension  · CAD  · Failed RTX times 2         H/o DVT, s/p ivc filter/ h/o HIT      PLAN-  · SEEN ON hd today  · remove 2 kg  · 2 k bath  · epogen for anemia     Subjective: Interval History:   Seen on hd  bp stable  No sob      Objective:   Vitals:    12/21/21 0830 12/21/21 0845 12/21/21 0900 12/21/21 0915   BP: 127/87 122/85 125/85 129/88   Pulse: 90 80 82 83   Resp: 19 18 17 16   Temp:       TempSrc:       SpO2: 98% 100% 100% 100%   Weight:       Height:          12/20 0701 - 12/21 0700  In: 63.8 [I.V.:63.8]  Out: -   Last 3 Recorded Weights in this Encounter    12/17/21 0437 12/18/21 0232 12/21/21 0444   Weight: 61.5 kg (135 lb 9.6 oz) 63.4 kg (139 lb 12.8 oz) 61.3 kg (135 lb 1.6 oz)      Physical exam:   GEN: NAD  NECK- Supple  RESP: no distress  NEURO:non focal    Seen from outside of the room    Chart reviewed. Pertinent Notes reviewed.      Data Review :  Recent Labs     12/21/21  0237 12/20/21  0604 12/19/21  1139    140 139   K 5.0 5.7* 5.0    105 104   CO2 26 25 28   BUN 78* 68* 44*   CREA 7.02* 5.89* 4.66*   * 119* 91   CA 8.0* 8.4* 7.9*   MG 2.4 2.7*  --    PHOS 5.6* 5.7*  --      Recent Labs     12/21/21  0237 12/20/21  0604 12/19/21  1139   WBC 6.0 7.0 11.7*   HGB 8.4* 9.3* 6.7* HCT 26.4* 29.8* 20.9*    143* 124*     No results for input(s): FE, TIBC, PSAT, FERR in the last 72 hours.    Medication list  reviewed  Current Facility-Administered Medications   Medication Dose Route Frequency    octreotide (SANDOSTATIN) 500 mcg in 0.9% sodium chloride 500 mL infusion  25 mcg/hr IntraVENous CONTINUOUS    ondansetron (ZOFRAN ODT) tablet 4 mg  4 mg Oral Q6H PRN    0.9% sodium chloride infusion 250 mL  250 mL IntraVENous PRN    metoclopramide HCl (REGLAN) injection 5 mg  5 mg IntraVENous Q12H    pantoprazole (PROTONIX) 40 mg in 0.9% sodium chloride 10 mL injection  40 mg IntraVENous Q12H    morphine injection 1 mg  1 mg IntraVENous Q4H PRN    morphine injection 0.5 mg  0.5 mg IntraVENous Q4H PRN    [Held by provider] metoclopramide HCl (REGLAN) tablet 5 mg  5 mg Oral BID    [Held by provider] pantoprazole (PROTONIX) tablet 40 mg  40 mg Oral ACB&D    calcium carbonate (TUMS) chewable tablet 400 mg [elemental]  400 mg Oral QID PRN    insulin lispro (HUMALOG) injection   SubCUTAneous AC&HS    oxyCODONE IR (ROXICODONE) tablet 5 mg  5 mg Oral Q4H PRN    oxyCODONE IR (ROXICODONE) tablet 10 mg  10 mg Oral Q4H PRN    diphenhydrAMINE (BENADRYL) capsule 25 mg  25 mg Oral QHS PRN    balsam peru-castor oiL (VENELEX) ointment   Topical Q12H    epoetin emilie-epbx (RETACRIT) injection 20,000 Units  20,000 Units SubCUTAneous Q TUE, THU & SAT    glucagon (GLUCAGEN) injection 1 mg  1 mg IntraMUSCular PRN    dextrose (D50W) injection syrg 12.5-25 g  12.5-25 g IntraVENous PRN    simethicone (MYLICON) 05MS/6.0BE oral drops 80 mg  1.2 mL Oral Multiple    [Held by provider] calcium acetate(phosphat bind) (PHOSLO) capsule 1,334 mg  2 Capsule Per NG tube TID WITH MEALS    albuterol-ipratropium (DUO-NEB) 2.5 MG-0.5 MG/3 ML  3 mL Nebulization Q4H PRN    albumin human 25% (BUMINATE) solution 25 g  25 g IntraVENous DIALYSIS PRN    alcohol 62% (NOZIN) nasal  1 Ampule  1 Ampule Topical Q12H  chlorhexidine (PERIDEX) 0.12 % mouthwash 15 mL  15 mL Oral Q12H    acetaminophen (TYLENOL) tablet 650 mg  650 mg Oral Q6H PRN    [Held by provider] L.acidophilus-paracasei-S.thermophil-bifidobacter (RISAQUAD) 8 billion cell capsule  1 Capsule Oral DAILY    sodium chloride (NS) flush 5-40 mL  5-40 mL IntraVENous Q8H    sodium chloride (NS) flush 5-40 mL  5-40 mL IntraVENous PRN    polyethylene glycol (MIRALAX) packet 17 g  17 g Oral DAILY PRN          Philip Ochoa MD              [facility]  [facility] / [facility] [id number]  [place], [address]  Phone - [phone]               Fax - [phone]

## 2021-12-21 NOTE — PROGRESS NOTES
Comprehensive Nutrition Assessment    Type and Reason for Visit: Reassess    Nutrition Recommendations/Plan:   Clear liquids per GI  Discontinued renal restriction while on clear liquids, will add back once diet advanced  Consider advancing from clears to GI bland when ready - with renal restrictions added to full liquid diet, patient will essentially only be allowed clear liquids  Adjust supplement to Ensure clear TID while receiving clear liquids (discontinued Nepro)    Nutrition Assessment:     Chart reviewed; medically noted for gastric varices. PMH HTN and ESRD on HD. Patient receiving dialysis at time of attempted visit. Now receiving clear liquids per GI; potential diet advancement tomorrow. No need for renal restrictions on an already restrictive diet; will plan to add back as diet advances. K+ at high end of normal and phos elevated. No longer receiving TPN. Discontinued nepro while receiving clear liquids and increased Ensure clear to TID. Diet advancement per GI; will adjust supplements as needed. Encourage PO intake of meals. Will monitor need to re-start TPN if unable to advance/tolerate PO diet. Estimated Daily Nutrient Needs:  Energy (kcal): 1896 kcal (BMR 1459 x 1. 3AF); Weight Used for Energy Requirements: Current  Protein (g): 79-92g (1.2-1.5 g/kg bw); Weight Used for Protein Requirements: Current  Fluid (ml/day): 1500 mL or per MD; Method Used for Fluid Requirements: Standard renal    Nutrition Related Findings:    K+ 5.0, phos 5.6, -[phone]  1-2+ edema  BM 12/20  Humalog, Reglan, Protonix, Octreotide       Wounds:    Skin tears       Current Nutrition Therapies:  ADULT ORAL NUTRITION SUPPLEMENT Breakfast, Lunch, Dinner; Clear Liquid  ADULT DIET Clear Liquid    Anthropometric Measures:  · Height:  5' 7\" (170.2 cm)  · Current Body Wt:  61.3 kg (135 lb 2.3 oz)   · BMI Category:  Normal weight (BMI 18.5-24. 9)       Nutrition Diagnosis:   · Inadequate protein-energy intake related to altered GI function (decreased appetite) as evidenced by NPO or clear liquid status due to medical condition,intake 26-50%    Nutrition Interventions:   Food and/or Nutrient Delivery: Continue current diet,Modify oral nutrition supplement  Nutrition Education and Counseling: No recommendations at this time  Coordination of Nutrition Care: Continue to monitor while inpatient    Goals:  Diet advanced and PO >50% of meals/supplement next 2-4 days       Nutrition Monitoring and Evaluation:   Behavioral-Environmental Outcomes: None identified  Food/Nutrient Intake Outcomes: Supplement intake,Food and nutrient intake  Physical Signs/Symptoms Outcomes: Biochemical data,Weight,GI status    Discharge Planning:     Too soon to determine     Electronically signed by El Luna RD on 12/21/2021 at 1:06 PM    Contact: ext [phone]

## 2021-12-21 NOTE — PROGRESS NOTES
TRANSFER - IN REPORT:    Verbal report received from Cari Huynh RN on Stef Carty  being received from PCU(unit) for ordered procedure      Report consisted of patients Situation, Background, Assessment and   Recommendations(SBAR). Information from the following report(s) Kardex was reviewed with the receiving nurse. Opportunity for questions and clarification was provided. Assessment completed upon patients arrival to unit and care assumed.

## 2021-12-21 NOTE — PROGRESS NOTES
Hospitalist Progress Note    NAME: Hosea Pinedo   :  1975   MRN:  [id number]       Assessment / Plan:    Hemorrhagic shock  Upper GI bleed  Thrombocytopenia   Ileus   Protein calorie malnutrition   transaminitis   EGD: distal esophageal ulcer with large clots in stomach  Repeat EGD: distal esophageal ulcer with blood in stomach  rd EGD on , Multiple superficial esophageal and gastric ulcers with no sign of recent bleeding, Large amount of blood clot and clear liquid in the stomach. Suctioned., Submucosal venous abnormality possibly consistent with a gastric varix/ venous abnormality was seen in the fundus. Two hemoclips placed near these abnormalities to jeane there location for possible fluoroscopic intervention.   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 Code status: Full  Prophylaxis: SCD's  Recommended Disposition: SAH/Rehab  Anticipated Discharge Date:  >48 hrs, does not want to continue with home HD, was to have HD at outpt center for short period of time after DC        Subjective:     Discussed with RN events overnight. Afebrile  Denies CP and SOB  Leg swelling   abd pain  No more vomiting  Another maroon bowel movement today     Review of Systems:  Symptom Y/N Comments  Symptom Y/N Comments   Fever/Chills    Chest Pain     Poor Appetite    Edema     Cough    Abdominal Pain     Sputum    Joint Pain     SOB/ZAMUDIO    Pruritis/Rash     Nausea/vomit    Tolerating PT/OT     Diarrhea    Tolerating Diet     Constipation    Other       PO intake: No data found. Wt Readings from Last 10 Encounters:   12/18/21 63.4 kg (139 lb 12.8 oz)   11/17/21 66.1 kg (145 lb 12.8 oz)   04/07/21 66.8 kg (147 lb 4.3 oz)   03/27/21 67.4 kg (148 lb 9.4 oz)   11/08/20 68.6 kg (151 lb 4.8 oz)   11/07/20 67.1 kg (148 lb)   10/20/20 67.5 kg (148 lb 13 oz)   09/13/20 70 kg (154 lb 5.2 oz)   09/10/20 70 kg (154 lb 5.2 oz)   09/08/20 66.7 kg (147 lb 0.8 oz)       Objective:     VITALS:   Last 24hrs VS reviewed since prior progress note.  Most recent are:  Patient Vitals for the past 24 hrs:   Temp Pulse Resp BP SpO2   12/20/21 2311 98.6 °F (37 °C) 94 21 (!) 133/93 100 %   12/20/21 2024 98.6 °F (37 °C) 97 16 118/80 97 %   12/20/21 2000     100 %   12/20/21 1500 98.6 °F (37 °C) (!) 101 26 120/86 100 %   12/20/21 1105 98.3 °F (36.8 °C) 99 23 112/81 100 %   12/20/21 0850 98.6 °F (37 °C) 96 13 112/80 100 %   12/20/21 0400 99.2 °F (37.3 °C) 91 19 110/70 98 %       Intake/Output Summary (Last 24 hours) at 12/21/2021 0254  Last data filed at 12/20/2021 2000  Gross per 24 hour   Intake 63.75 ml   Output    Net 63.75 ml        I had a face to face encounter, and independently examined this patient on 12/21/2021, as outlined below:    PHYSICAL EXAM:  General:    No distress     HEENT: Atraumatic, anicteric sclerae, pink conjunctivae, MMM  Neck:  Supple, symmetrical  Lungs:   CTA. No Wheezing/Rhonchi. No rales. No tenderness. No Accessory muscle use. Heart:   Regular rhythm. No murmur. No JVD. Left UE AVF w + thrill   GI/:   Soft. NT. ND. BS normal  Extremities: + edema. No cyanosis. No clubbing. Skin:     Not pale. Not Jaundiced. No rashes   Psych:  Good insight. Not depressed. Not anxious or agitated. Neurologic: Alert and oriented X 4. EOMs intact. No facial asymmetry. No slurred speech. Symmetrical strength, Sensation grossly intact. Labs     I reviewed today's most current labs and imaging studies. Pertinent labs include:  Recent Labs     12/20/21  0604 12/19/21  1139 12/18/21  0650   WBC 7.0 11.7* 8.3   HGB 9.3* 6.7* 9.0*   HCT 29.8* 20.9* 27.7*   * 124* 114*     Recent Labs     12/20/21  0604 12/19/21  1139 12/18/21  0650    139 133*   K 5.7* 5.0 4.4    104 100   CO2 25 28 23   * 91 80   BUN 68* 44* 65*   CREA 5.89* 4.66* 5.37*   CA 8.4* 7.9* 8.4*   MG 2.7*  --  2.6*   PHOS 5.7*  --   --    ALB 2.1*  --  2.0*   TBILI 0.6  --  0.7   ALT 32  --  40   INR  --  1.2*  --      XR ABD (KUB)    Result Date: 12/19/2021  Right femoral central venous catheter as above. Significant gastric distention. XR ABD (KUB)    Result Date: 12/10/2021  Femoral catheter as above. XR ABD (KUB)    Result Date: 12/8/2021  Gaseous distention of the large and small bowel. No evidence of free intraperitoneal air on this single view. CT CHEST W CONT    Result Date: 12/19/2021  Cardiomegaly with trace pleural effusions. Patchy interstitial and airspace opacities could be due to edema or pneumonia including viral/atypical etiology. See separate report regarding CT abdomen pelvis GI bleed protocol. CT ABD PELV WO CONT    Result Date: 12/8/2021  1. Generalized gaseous distention of large and small bowel. Findings are consistent with a generalized ileus. 2. No evidence of bowel perforation. 3. Continued gastric distention with hemorrhagic material, similar to prior study. 4. NG tube in the stomach. 5. Covid 19 pneumonia has worsened in the visualized lung bases. 6. Continued small to moderate bilateral pleural effusions. 7. Severe diffuse calcific atherosclerosis. 8. Evidence of end-stage renal disease. CT ABD PELV W WO CONT    Result Date: 12/19/2021  1. No CTA evidence of acute gastrointestinal hemorrhage. 2. Fluid-filled slightly distended distal thoracic esophagus and stomach which also contains high density material. Moderate fluid distention of the colon. No bowel obstruction. 3. Multiple splenic infarctions and occlusion in the proximal to mid splenic artery. No splenomegaly. 4. Small pleural effusions and bilateral interstitial/airspace opacities in the lung bases. See separate CT chest report    CT ABD PELV W WO CONT    Result Date: 12/7/2021  1. There is a large amount of hemorrhage within a distended stomach, extending into the duodenum. An active bleed is not seen on this examination. However, delayed images were not performed, somewhat limiting the study. 2. Bilateral pleural effusions with bilateral pneumonia. XR CHEST PORT    Result Date: 12/9/2021  Unchanged patchy bilateral airspace disease. XR CHEST PORT    Result Date: 12/8/2021  NG tube is coiled in the patient's throat, and terminates in the mid/distal thoracic esophagus. Recommend repositioning. XR CHEST PORT    Result Date: 12/7/2021  No change. XR ABD PORT  1 V    Result Date: 12/7/2021  1. Rotated film. Femoral line overlies the right pelvis     XR ABD PORT  1 V    Result Date: 12/7/2021  Distended stomach, NG tube in place. IR INSERT NON TUNL CVC OVER 5 YRS    Result Date: 12/10/2021  Successful exchange of left femoral Jorge A catheter. The catheter is ready for immediate use. IR INSERT NON TUNL CVC OVER 5 YRS    Result Date: 12/7/2021  1.   Successful placement of left common femoral temporary dialysis catheter. The catheter is ready for immediate use. No results found. 11/05/21    ECHO EVER W OR WO CONTRAST 11/11/2021 11/11/2021    Interpretation Summary  · No signs for endocarditis by EVER. · LV: Estimated LVEF is 35 - 40%. Normal cavity size. Mildly increased wall thickness. Moderately reduced systolic function. · MV: Mitral valve leaflet calcification. Mild mitral annular calcification. 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 · TV: Moderate tricuspid valve regurgitation is present. · PA: Moderate pulmonary hypertension.     Signed by: Jennifer Portillo MD on 11/11/2021 10:42 AM       Current Medications:     Current Facility-Administered Medications:     octreotide (SANDOSTATIN) 500 mcg in 0.9% sodium chloride 500 mL infusion, 25 mcg/hr, IntraVENous, CONTINUOUS, Shira Davis MD, Last Rate: 25 mL/hr at 12/20/21 1727, 25 mcg/hr at 12/20/21 1727    ondansetron (ZOFRAN ODT) tablet 4 mg, 4 mg, Oral, Q6H PRN, Navi Don MD, 4 mg at 12/19/21 1038    0.9% sodium chloride infusion 250 mL, 250 mL, IntraVENous, PRN, Navi Don MD    metoclopramide HCl (REGLAN) injection 5 mg, 5 mg, IntraVENous, Q12H, Atif Don MD, 5 mg at 12/20/21 2143    pantoprazole (PROTONIX) 40 mg in 0.9% sodium chloride 10 mL injection, 40 mg, IntraVENous, Q12H, Atif Don MD, 40 mg at 12/20/21 2142    morphine injection 1 mg, 1 mg, IntraVENous, Q4H PRN, Atif Don MD, 1 mg at 12/20/21 2156    morphine injection 0.5 mg, 0.5 mg, IntraVENous, Q4H PRN, Navi Don MD    [Held by provider] metoclopramide HCl (REGLAN) tablet 5 mg, 5 mg, Oral, BID, Atif Don MD, 5 mg at 12/19/21 0027    [Held by provider] pantoprazole (PROTONIX) tablet 40 mg, 40 mg, Oral, ACB&D, Navi Don MD, 40 mg at 12/19/21 0836    calcium carbonate (TUMS) chewable tablet 400 mg [elemental], 400 mg, Oral, QID PRN, Purveyor, Atoosa, ACNP, 400 mg at 12/18/21 2134    insulin lispro (HUMALOG) injection, , SubCUTAneous, AC&HS, Chantell Don MD    oxyCODONE IR (ROXICODONE) tablet 5 mg, 5 mg, Oral, Q4H PRN, Purveyor, Atoosa, ACNP    oxyCODONE IR (ROXICODONE) tablet 10 mg, 10 mg, Oral, Q4H PRN, Purveyor, Atoosa, ACNP, 10 mg at 12/19/21 1324    diphenhydrAMINE (BENADRYL) capsule 25 mg, 25 mg, Oral, QHS PRN, Jing Kitchen MD, 25 mg at 12/15/21 2120    balsam peru-castor oiL (VENELEX) ointment, , Topical, Q12H, Jing Kitchen MD, Given at 12/20/21 2148    epoetin emilie-epbx (RETACRIT) injection 20,000 Units, 20,000 Units, SubCUTAneous, Q TUE, THU & SAT, Reina HERNÁNDEZ MD, 20,000 Units at 12/18/21 2200    glucagon (GLUCAGEN) injection 1 mg, 1 mg, IntraMUSCular, PRN, Sourav Hylton MD    dextrose (D50W) injection syrg 12.5-25 g, 12.5-25 g, IntraVENous, PRN, Sourav Hylton MD, 25 g at 12/09/21 1719    simethicone (MYLICON) 84XT/6.4QV oral drops 80 mg, 1.2 mL, Oral, Multiple, Paula Gaitan MD    [Held by provider] calcium acetate(phosphat bind) (PHOSLO) capsule 1,334 mg, 2 Capsule, Per NG tube, TID WITH MEALS, Sourav Hylton MD    albuterol-ipratropium (DUO-NEB) 2.5 MG-0.5 MG/3 ML, 3 mL, Nebulization, Q4H PRN, Sourav Hylton MD    albumin human 25% (BUMINATE) solution 25 g, 25 g, IntraVENous, DIALYSIS PRN, Shailesh Garcia MD, 25 g at 12/07/21 1318    alcohol 62% (NOZIN) nasal  1 Ampule, 1 Ampule, Topical, Q12H, Sourav Hylton MD, 1 Ampule at 12/20/21 2141    chlorhexidine (PERIDEX) 0.12 % mouthwash 15 mL, 15 mL, Oral, Q12H, Sourav Hylton MD, 15 mL at 12/20/21 2141    acetaminophen (TYLENOL) tablet 650 mg, 650 mg, Oral, Q6H PRN, Maryann Bates MD, 650 mg at 12/18/21 0343    [Held by provider] L.acidophilus-paracasei-S.thermophil-bifidobacter (RISAQUAD) 8 billion cell capsule, 1 Capsule, Oral, DAILY, Shweta Grimm MD, 1 Capsule at 12/05/21 0909    sodium chloride (NS) flush 5-40 mL, 5-40 mL, IntraVENous, Q8H, Shweta Grimm MD, 10 mL at 12/20/21 2142    sodium chloride (NS) flush 5-40 mL, 5-40 mL, IntraVENous, PRN, Shweta Tiwari MD, 10 mL at 12/16/21 0815    polyethylene glycol (MIRALAX) packet 17 g, 17 g, Oral, DAILY PRN, Heath Vincent MD     Procedures: see electronic medical records for all procedures/Xrays and details which were not copied into this note but were reviewed prior to creation of Plan. Reviewed most current lab test results and cultures  YES  Reviewed most current radiology test results   YES  Review and summation of old records today    NO  Reviewed patient's current orders and MAR    YES  PMH/SH reviewed - no change compared to H&P  ________________________________________________________________________  Care Plan discussed with:    Comments   Patient x    Family      RN x    Care Manager     Consultant                        Multidiciplinary team rounds were held today with , nursing, pharmacist and clinical coordinator. Patient's plan of care was discussed; medications were reviewed and discharge planning was addressed.      ________________________________________________________________________  Total NON critical care TIME:  40  Minutes    Total CRITICAL CARE TIME Spent:   Minutes non procedure based      Comments   >50% of visit spent in counseling and coordination of care x     This includes time during multidisciplinary rounds if indicated above   ________________________________________________________________________  Michelle Coppersmith, MD

## 2021-12-21 NOTE — PROGRESS NOTES
1900 Bedside and Verbal shift change report given to Leatha Killian RN (oncoming nurse) by Rip Day (offgoing nurse). Report included the following information SBAR, Kardex, ED Summary, Procedure Summary, Recent Results and Cardiac Rhythm NSR.     2150 Patient reports pain 8/10, admin Morphine 1mg IV for patient discomfort. Provided patient education about pain medications, patient indicated understanding. 4736 Patient reports pain 8/10, admin Morphine 0.5 mg IV for severe pain. End of Shift Note    0700 Bedside shift change report given to Rip Day (oncoming nurse) by Leatha Killian RN (offgoing nurse). Report included the following information SBAR, Kardex, Procedure Summary, Intake/Output, Recent Results and Cardiac Rhythm NSR    Shift worked:  [id number]     Shift summary and any significant changes:     none     Concerns for physician to address:  none     Zone phone for oncoming shift:   [phone]       Activity:  Activity Level: Up with Assistance  Number times ambulated in hallways past shift: 0  Number of times OOB to chair past shift: 3    Cardiac:   Cardiac Monitoring: Yes      Cardiac Rhythm: Sinus Rhythm    Access:   Current line(s): central line     Genitourinary:   Urinary status: anuric    Respiratory:   O2 Device: None (Room air)  Chronic home O2 use?: NO  Incentive spirometer at bedside: YES     GI:  Last Bowel Movement Date: 12/20/21  Current diet:  ADULT ORAL NUTRITION SUPPLEMENT Breakfast; Clear Liquid  ADULT ORAL NUTRITION SUPPLEMENT Lunch, Dinner; Renal Supplement  ADULT DIET Clear Liquid; Low Sodium (2 gm); Low Potassium (Less than 3000 mg/day)  Passing flatus: YES  Tolerating current diet: YES       Pain Management:   Patient states pain is manageable on current regimen: YES    Skin:  Yong Score: 19  Interventions: float heels and increase time out of bed    Patient Safety:  Fall Score:  Total Score: 3  Interventions: bed/chair alarm and pt to call before getting OOB  High Fall Risk: Yes    Length of Stay:  Expected LOS: 4d 21h  Actual LOS: 24      Mandeep Feldman RN

## 2021-12-21 NOTE — PROCEDURES
Hemodialysis / [phone]    Vitals Pre Post Assessment Pre Post   BP BP: (!) 118/90 (12/21/21 0800) 114/76 LOC A & O x 4 No change   HR Pulse (Heart Rate): 85 (12/21/21 0800) 85 Lungs Fine crackles LL No change   Resp Resp Rate: 21 (12/21/21 0800) 21 Cardiac S1S2 No change   Temp Temp: 97.6 °F (36.4 °C) (12/21/21 0745) 98.5 Skin Warm ray & intact No change   Weight Pre-Dialysis Weight: 66.7 kg (147 lb) (12/02/21 0743) n/a Edema +2 Lower ext No change   Tele status yes yes Pain Pain Intensity 1: 7 (12/21/21 0732) No change     Orders   Duration: Start: 0745 End: 1115 Total: 3.5   Dialyzer: Dialyzer/Set Up Inspection: Revaclear (12/21/21 0745)   K Bath: Dialysate K (mEq/L): 3 (12/21/21 0745)   Ca Bath: Dialysate CA (mEq/L): 2.5 (12/21/21 0745)   Na: Dialysate NA (mEq/L): 138 (12/21/21 0745)   Bicarb: Dialysate HCO3 (mEq/L): 38 (12/21/21 0745)   Target Fluid Removal: Goal/Amount of Fluid to Remove (mL): 2500 mL (12/21/21 0745)     Access  AVG   Type & Location: Left upper avg   Comments:          +thrill & bruit. Cannulated x 2 with 15G needles.  Blood flow 400                              Labs   HBsAg (Antigen) / date:  Neg  12/1/21                                             HBsAb (Antibody) / date: imm 11/11/21   Source: epic   Obtained/Reviewed  Critical Results Called HGB   Date Value Ref Range Status   12/21/2021 8.4 (L) 12.1 - 17.0 g/dL Final     Potassium   Date Value Ref Range Status   12/21/2021 5.0 3.5 - 5.1 mmol/L Final     Calcium   Date Value Ref Range Status   12/21/2021 8.0 (L) 8.5 - 10.1 MG/DL Final     BUN   Date Value Ref Range Status   12/21/2021 78 (H) 6 - 20 MG/DL Final     Creatinine   Date Value Ref Range Status   12/21/2021 7.02 (H) 0.70 - 1.30 MG/DL Final        Meds Given   Name Dose Route   none                 Adequacy / Fluid    Total Liters Process: 69.2   Net Fluid Removed: 2500ml      Comments   Time Out Done:   (Time) 7319   Admitting Diagnosis: · Covid 19 infection  · Hemorrhagic shock  · Acute blood loss anemia from esophageal bleed from esophagitis  · hyperkalemia   Consent obtained/signed: Informed Consent Verified: Yes (12/18/21 1236)   Machine / RO # Machine Number: Sebastien Dobson (12/21/21 0745)   Primary Nurse Rpt Pre: Louretta Merlin, RN   Primary Nurse Rpt Post: Marilee Gerard   Pt Education: Access care   Care Plan: Continue HD   Pts outpatient clinic: home     Tx Summary  SUZANNE AVG: skin CDI. No s/s of infection. + B/T. No issues with cannulation or hemostasis. Running well at . Pt arrived to HD [address]. Consent signed & on file. SBAR received from Primary RN. 0745: Pt cannulated with 93G needles per policy & without issue. Labs drawn per request/ order. VSS. Dialysis Tx initiated. 0815: Pt resting quietly. 0845: pt. Stable. Lines secure and visible  0915: pt. Resting well. Lines secure.  rounding aware of access issues. Change to 3k bath  0945: pt. Stable. No s/s of distress  1015: pt. Resting well. Lines secure  1045: pt. Resting well. Lines secure  1115: Tx ended. VSS. All possible blood returned to patient. Hemostasis achieved without issue. Bed locked and in the lowest position, call bell and belongings in reach. SBAR given to Primary, RN. Patient is stable at time of their/ my departure. All Dialysis related medications have been reviewed. Comments:     RN reviewed LPN assessment and completed RN assessment. RN completed patient assessment. RN reviewed technicians vital signs and procedure note. Tx completed.  Reviewed by MANUELITO Corado

## 2021-12-22 LAB
ALBUMIN SERPL-MCNC: 2.1 G/DL (ref 3.5–5)
ALBUMIN/GLOB SERPL: 0.5 {RATIO} (ref 1.1–2.2)
ALP SERPL-CCNC: 131 U/L (ref 45–117)
ALT SERPL-CCNC: 40 U/L (ref 12–78)
ANION GAP SERPL CALC-SCNC: 7 MMOL/L (ref 5–15)
AST SERPL-CCNC: 38 U/L (ref 15–37)
BASOPHILS # BLD: 0.2 K/UL (ref 0–0.1)
BASOPHILS NFR BLD: 4 % (ref 0–1)
BILIRUB SERPL-MCNC: 0.8 MG/DL (ref 0.2–1)
BUN SERPL-MCNC: 36 MG/DL (ref 6–20)
BUN/CREAT SERPL: 7 (ref 12–20)
CALCIUM SERPL-MCNC: 7.2 MG/DL (ref 8.5–10.1)
CHLORIDE SERPL-SCNC: 101 MMOL/L (ref 97–108)
CO2 SERPL-SCNC: 28 MMOL/L (ref 21–32)
CREAT SERPL-MCNC: 4.9 MG/DL (ref 0.7–1.3)
DIFFERENTIAL METHOD BLD: ABNORMAL
EOSINOPHIL # BLD: 0 K/UL (ref 0–0.4)
EOSINOPHIL NFR BLD: 0 % (ref 0–7)
ERYTHROCYTE [DISTWIDTH] IN BLOOD BY AUTOMATED COUNT: 19.1 % (ref 11.5–14.5)
GLOBULIN SER CALC-MCNC: 3.9 G/DL (ref 2–4)
GLUCOSE BLD STRIP.AUTO-MCNC: 110 MG/DL (ref 65–117)
GLUCOSE BLD STRIP.AUTO-MCNC: 137 MG/DL (ref 65–117)
GLUCOSE BLD STRIP.AUTO-MCNC: 78 MG/DL (ref 65–117)
GLUCOSE BLD STRIP.AUTO-MCNC: 84 MG/DL (ref 65–117)
GLUCOSE BLD STRIP.AUTO-MCNC: 99 MG/DL (ref 65–117)
GLUCOSE SERPL-MCNC: 145 MG/DL (ref 65–100)
HCT VFR BLD AUTO: 26.2 % (ref 36.6–50.3)
HGB BLD-MCNC: 8.1 G/DL (ref 12.1–17)
IMM GRANULOCYTES # BLD AUTO: 0 K/UL (ref 0–0.04)
IMM GRANULOCYTES NFR BLD AUTO: 0 % (ref 0–0.5)
LYMPHOCYTES # BLD: 0.9 K/UL (ref 0.8–3.5)
LYMPHOCYTES NFR BLD: 17 % (ref 12–49)
MAGNESIUM SERPL-MCNC: 2 MG/DL (ref 1.6–2.4)
MCH RBC QN AUTO: 29.2 PG (ref 26–34)
MCHC RBC AUTO-ENTMCNC: 30.9 G/DL (ref 30–36.5)
MCV RBC AUTO: 94.6 FL (ref 80–99)
MONOCYTES # BLD: 0.2 K/UL (ref 0–1)
MONOCYTES NFR BLD: 4 % (ref 5–13)
NEUTS SEG # BLD: 4.2 K/UL (ref 1.8–8)
NEUTS SEG NFR BLD: 75 % (ref 32–75)
NRBC # BLD: 0.02 K/UL (ref 0–0.01)
NRBC BLD-RTO: 0.4 PER 100 WBC
PHOSPHATE SERPL-MCNC: 4.9 MG/DL (ref 2.6–4.7)
PLATELET # BLD AUTO: 148 K/UL (ref 150–400)
PMV BLD AUTO: 11.7 FL (ref 8.9–12.9)
POTASSIUM SERPL-SCNC: 4.1 MMOL/L (ref 3.5–5.1)
PROT SERPL-MCNC: 6 G/DL (ref 6.4–8.2)
RBC # BLD AUTO: 2.77 M/UL (ref 4.1–5.7)
RBC MORPH BLD: ABNORMAL
SERVICE CMNT-IMP: ABNORMAL
SERVICE CMNT-IMP: NORMAL
SODIUM SERPL-SCNC: 136 MMOL/L (ref 136–145)
WBC # BLD AUTO: 5.5 K/UL (ref 4.1–11.1)

## 2021-12-22 PROCEDURE — 80053 COMPREHEN METABOLIC PANEL: CPT

## 2021-12-22 PROCEDURE — 74011250637 HC RX REV CODE- 250/637: Performed by: NURSE PRACTITIONER

## 2021-12-22 PROCEDURE — 97116 GAIT TRAINING THERAPY: CPT

## 2021-12-22 PROCEDURE — 74011250636 HC RX REV CODE- 250/636: Performed by: INTERNAL MEDICINE

## 2021-12-22 PROCEDURE — 74011250637 HC RX REV CODE- 250/637: Performed by: HOSPITALIST

## 2021-12-22 PROCEDURE — 36415 COLL VENOUS BLD VENIPUNCTURE: CPT

## 2021-12-22 PROCEDURE — 82962 GLUCOSE BLOOD TEST: CPT

## 2021-12-22 PROCEDURE — 74011000250 HC RX REV CODE- 250: Performed by: INTERNAL MEDICINE

## 2021-12-22 PROCEDURE — 74011250637 HC RX REV CODE- 250/637: Performed by: INTERNAL MEDICINE

## 2021-12-22 PROCEDURE — C9113 INJ PANTOPRAZOLE SODIUM, VIA: HCPCS | Performed by: GENERAL ACUTE CARE HOSPITAL

## 2021-12-22 PROCEDURE — 83735 ASSAY OF MAGNESIUM: CPT

## 2021-12-22 PROCEDURE — 84100 ASSAY OF PHOSPHORUS: CPT

## 2021-12-22 PROCEDURE — 74011000250 HC RX REV CODE- 250: Performed by: GENERAL ACUTE CARE HOSPITAL

## 2021-12-22 PROCEDURE — 74011250636 HC RX REV CODE- 250/636: Performed by: GENERAL ACUTE CARE HOSPITAL

## 2021-12-22 PROCEDURE — 74011636637 HC RX REV CODE- 636/637: Performed by: INTERNAL MEDICINE

## 2021-12-22 PROCEDURE — 97164 PT RE-EVAL EST PLAN CARE: CPT

## 2021-12-22 PROCEDURE — 65660000000 HC RM CCU STEPDOWN

## 2021-12-22 PROCEDURE — 85025 COMPLETE CBC W/AUTO DIFF WBC: CPT

## 2021-12-22 RX ADMIN — MORPHINE SULFATE 1 MG: 2 INJECTION, SOLUTION INTRAMUSCULAR; INTRAVENOUS at 23:51

## 2021-12-22 RX ADMIN — OXYCODONE 10 MG: 5 TABLET ORAL at 22:29

## 2021-12-22 RX ADMIN — MORPHINE SULFATE 1 MG: 2 INJECTION, SOLUTION INTRAMUSCULAR; INTRAVENOUS at 07:45

## 2021-12-22 RX ADMIN — Medication: at 22:48

## 2021-12-22 RX ADMIN — OXYCODONE 10 MG: 5 TABLET ORAL at 04:23

## 2021-12-22 RX ADMIN — ALUMINUM HYDROXIDE AND MAGNESIUM HYDROXIDE 15 ML: 200; 200 SUSPENSION ORAL at 15:13

## 2021-12-22 RX ADMIN — Medication 10 ML: at 18:03

## 2021-12-22 RX ADMIN — NADOLOL 20 MG: 40 TABLET ORAL at 10:24

## 2021-12-22 RX ADMIN — Medication 1 AMPULE: at 22:49

## 2021-12-22 RX ADMIN — CHLORHEXIDINE GLUCONATE 15 ML: 1.2 RINSE ORAL at 22:46

## 2021-12-22 RX ADMIN — CHLORHEXIDINE GLUCONATE 15 ML: 1.2 RINSE ORAL at 09:14

## 2021-12-22 RX ADMIN — OCTREOTIDE ACETATE 25 MCG/HR: 500 INJECTION, SOLUTION INTRAVENOUS; SUBCUTANEOUS at 09:15

## 2021-12-22 RX ADMIN — METOCLOPRAMIDE 5 MG: 5 INJECTION, SOLUTION INTRAMUSCULAR; INTRAVENOUS at 09:16

## 2021-12-22 RX ADMIN — Medication 10 ML: at 22:45

## 2021-12-22 RX ADMIN — SODIUM CHLORIDE 40 MG: 9 INJECTION, SOLUTION INTRAMUSCULAR; INTRAVENOUS; SUBCUTANEOUS at 09:14

## 2021-12-22 RX ADMIN — MORPHINE SULFATE 1 MG: 2 INJECTION, SOLUTION INTRAMUSCULAR; INTRAVENOUS at 02:55

## 2021-12-22 RX ADMIN — MORPHINE SULFATE 1 MG: 2 INJECTION, SOLUTION INTRAMUSCULAR; INTRAVENOUS at 13:21

## 2021-12-22 RX ADMIN — Medication: at 09:00

## 2021-12-22 RX ADMIN — SODIUM CHLORIDE 40 MG: 9 INJECTION, SOLUTION INTRAMUSCULAR; INTRAVENOUS; SUBCUTANEOUS at 22:31

## 2021-12-22 RX ADMIN — DIPHENHYDRAMINE HYDROCHLORIDE 25 MG: 25 CAPSULE ORAL at 22:30

## 2021-12-22 RX ADMIN — MORPHINE SULFATE 1 MG: 2 INJECTION, SOLUTION INTRAMUSCULAR; INTRAVENOUS at 18:04

## 2021-12-22 RX ADMIN — METOCLOPRAMIDE 5 MG: 5 INJECTION, SOLUTION INTRAMUSCULAR; INTRAVENOUS at 22:31

## 2021-12-22 RX ADMIN — OXYCODONE 10 MG: 5 TABLET ORAL at 00:08

## 2021-12-22 NOTE — PROGRESS NOTES
Hospitalist Progress Note    NAME: Carol Rutledge   :  1975   MRN:  [id number]       Assessment / Plan:    Hemorrhagic shock  Upper GI bleed  Gastric varices   Thrombocytopenia   Ileus   Protein calorie malnutrition   transaminitis   EGD: distal esophageal ulcer with large clots in stomach  Repeat EGD: distal esophageal ulcer with blood in stomach  rd EGD on , Multiple superficial esophageal and gastric ulcers with no sign of recent bleeding, Large amount of blood clot and clear liquid in the stomach. Suctioned., Submucosal venous abnormality possibly consistent with a gastric varix/ venous abnormality was seen in the fundus. Two hemoclips placed near these abnormalities to jeane there location for possible fluoroscopic intervention. Hb stable, s/p transfusion of: 13 units PRBC, 3 units Plasma, 2 Cryoprecipitate, 3 units Plts  Gen Cheko not need for surgical intervention now  Keep Holding Coumadin   Epogen   Improving oral intake.  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16 kg/m². Code status: Full  Prophylaxis: SCD's  Recommended Disposition: SAH/Rehab vs home  Anticipated Discharge Date:  >48 hrs, does not want to continue with home HD, was to have HD at outpt center for short period of time after DC        Subjective:     Discussed with RN events overnight. Afebrile  Denies CP and SOB  Leg swelling   abd pain  No more vomiting  No more melena/black BM  Pt now interested in going home with outpt HD    Review of Systems:  Symptom Y/N Comments  Symptom Y/N Comments   Fever/Chills    Chest Pain     Poor Appetite    Edema     Cough    Abdominal Pain     Sputum    Joint Pain     SOB/ZAMUDIO    Pruritis/Rash     Nausea/vomit    Tolerating PT/OT     Diarrhea    Tolerating Diet     Constipation    Other       PO intake: No data found. Wt Readings from Last 10 Encounters:   12/21/21 61.3 kg (135 lb 1.6 oz)   11/17/21 66.1 kg (145 lb 12.8 oz)   04/07/21 66.8 kg (147 lb 4.3 oz)   03/27/21 67.4 kg (148 lb 9.4 oz)   11/08/20 68.6 kg (151 lb 4.8 oz)   11/07/20 67.1 kg (148 lb)   10/20/20 67.5 kg (148 lb 13 oz)   09/13/20 70 kg (154 lb 5.2 oz)   09/10/20 70 kg (154 lb 5.2 oz)   09/08/20 66.7 kg (147 lb 0.8 oz)       Objective:     VITALS:   Last 24hrs VS reviewed since prior progress note.  Most recent are:  Patient Vitals for the past 24 hrs:   Temp Pulse Resp BP SpO2   12/22/21 0008 98.3 °F (36.8 °C) 75 19 107/72 97 %   12/21/21 2200  87  97/72 99 %   12/21/21 2034 98.8 °F (37.1 °C) 90 20 90/67 98 %   12/21/21 1700  88 (!) 32 109/80    12/21/21 1600 98.6 °F (37 °C) 97 23 116/82 100 %   12/21/21 1115 98.5 °F (36.9 °C) 78 16 118/77 100 %   12/21/21 1105 97.7 °F (36.5 °C) 80 16 119/85 100 %   12/21/21 1100  88 19 119/85 100 %   12/21/21 1045  76 15 101/67 100 %   12/21/21 1030  73 16 110/71 100 %   12/21/21 1015  76 15 113/79 100 %   12/21/21 1000  80 13 122/82 100 %   12/21/21 0945  86 14 120/89 100 %   12/21/21 0930  80 16 120/88 100 %   12/21/21 0915  83 16 129/88 100 %   12/21/21 0900  82 17 125/85 100 %   12/21/21 0845  80 18 122/85 100 %   12/21/21 0830  90 19 127/87 98 %   12/21/21 0815  88 21 120/87 95 %   12/21/21 0800  85 21 (!) 118/90 100 %   12/21/21 0749  93 19 (!) 139/93    12/21/21 0745 97.6 °F (36.4 °C) 86 17 (!) 139/93 100 %   12/21/21 0445 97.5 °F (36.4 °C) 96 14 109/79 97 %       Intake/Output Summary (Last 24 hours) at 12/22/2021 0255  Last data filed at 12/21/2021 2038  Gross per 24 hour   Intake 615.83 ml   Output 2500 ml   Net -1884.17 ml        I had a face to face encounter, and independently examined this patient on 12/22/2021, as outlined below:    PHYSICAL EXAM:  General:    No distress     HEENT: Atraumatic, anicteric sclerae, pink conjunctivae, MMM  Neck:  Supple, symmetrical  Lungs:   CTA. No Wheezing/Rhonchi. No rales. No tenderness. No Accessory muscle use. Heart:   Regular rhythm. No murmur. No JVD. Left UE AVF w + thrill   GI/:   Soft. NT. ND. BS normal  Extremities: + edema. No cyanosis. No clubbing. Skin:     Not pale. Not Jaundiced. No rashes   Psych:  Good insight. Not depressed. Not anxious or agitated. Neurologic: Alert and oriented X 4. EOMs intact. No facial asymmetry. No slurred speech. Symmetrical strength, Sensation grossly intact. Labs     I reviewed today's most current labs and imaging studies.   Pertinent labs include:  Recent Labs     12/22/21  0151 12/21/21  0237 12/20/21  0604   WBC 5.5 6.0 7.0   HGB 8.1* 8.4* 9.3*   HCT 26.2* 26.4* 29.8*   * 157 143*     Recent Labs     12/21/21  0237 12/20/21  0604 12/19/21  1139    140 139   K 5.0 5.7* 5.0    105 104   CO2 26 25 28   * 119* 91   BUN 78* 68* 44*   CREA 7.02* 5.89* 4.66*   CA 8.0* 8.4* 7.9*   MG 2.4 2.7*  --    PHOS 5.6* 5.7*  --    ALB 2.2* 2.1*  --    TBILI 0.5 0.6  --    ALT 33 32  --    INR  --   --  1.2*     XR ABD (KUB)    Result Date: 12/19/2021  Right femoral central venous catheter as above. Significant gastric distention. XR ABD (KUB)    Result Date: 12/10/2021  Femoral catheter as above. XR ABD (KUB)    Result Date: 12/8/2021  Gaseous distention of the large and small bowel. No evidence of free intraperitoneal air on this single view. CT CHEST W CONT    Result Date: 12/19/2021  Cardiomegaly with trace pleural effusions. Patchy interstitial and airspace opacities could be due to edema or pneumonia including viral/atypical etiology. See separate report regarding CT abdomen pelvis GI bleed protocol. CT ABD PELV WO CONT    Result Date: 12/8/2021  1. Generalized gaseous distention of large and small bowel. Findings are consistent with a generalized ileus. 2. No evidence of bowel perforation. 3. Continued gastric distention with hemorrhagic material, similar to prior study. 4. NG tube in the stomach. 5. Covid 19 pneumonia has worsened in the visualized lung bases. 6. Continued small to moderate bilateral pleural effusions. 7. Severe diffuse calcific atherosclerosis. 8. Evidence of end-stage renal disease. CT ABD PELV W WO CONT    Result Date: 12/19/2021  1. No CTA evidence of acute gastrointestinal hemorrhage. 2. Fluid-filled slightly distended distal thoracic esophagus and stomach which also contains high density material. Moderate fluid distention of the colon. No bowel obstruction. 3. Multiple splenic infarctions and occlusion in the proximal to mid splenic artery. No splenomegaly. 4. Small pleural effusions and bilateral interstitial/airspace opacities in the lung bases. See separate CT chest report    CT ABD PELV W WO CONT    Result Date: 12/7/2021  1. There is a large amount of hemorrhage within a distended stomach, extending into the duodenum. An active bleed is not seen on this examination. However, delayed images were not performed, somewhat limiting the study. 2. Bilateral pleural effusions with bilateral pneumonia.     XR CHEST PORT    Result Date: 12/9/2021  Unchanged patchy bilateral airspace disease. XR CHEST PORT    Result Date: 12/8/2021  NG tube is coiled in the patient's throat, and terminates in the mid/distal thoracic esophagus. Recommend repositioning. XR CHEST PORT    Result Date: 12/7/2021  No change. XR ABD PORT  1 V    Result Date: 12/7/2021  1. Rotated film. Femoral line overlies the right pelvis     XR ABD PORT  1 V    Result Date: 12/7/2021  Distended stomach, NG tube in place. IR INSERT NON TUNL CVC OVER 5 YRS    Result Date: 12/10/2021  Successful exchange of left femoral Jorge A catheter. The catheter is ready for immediate use. IR INSERT NON TUNL CVC OVER 5 YRS    Result Date: 12/7/2021  1. Successful placement of left common femoral temporary dialysis catheter. The catheter is ready for immediate use. No results found. 11/05/21    ECHO EVER W OR WO CONTRAST 11/11/2021 11/11/2021    Interpretation Summary  · No signs for endocarditis by EVER. · LV: Estimated LVEF is 35 - 40%. Normal cavity size. Mildly increased wall thickness. Moderately reduced systolic function. · MV: Mitral valve leaflet calcification. Mild mitral annular calcification. 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 · TV: Moderate tricuspid valve regurgitation is present. · PA: Moderate pulmonary hypertension.     Signed by: Sumeet Chaparro MD on 11/11/2021 10:42 AM       Current Medications:     Current Facility-Administered Medications:     nadoloL (CORGARD) tablet 20 mg, 20 mg, Oral, DAILY, Catracho Valentine NP    octreotide (SANDOSTATIN) 500 mcg in 0.9% sodium chloride 500 mL infusion, 25 mcg/hr, IntraVENous, CONTINUOUS, Isatu Cordova MD, Last Rate: 25 mL/hr at 12/21/21 1247, 25 mcg/hr at 12/21/21 1247    ondansetron (ZOFRAN ODT) tablet 4 mg, 4 mg, Oral, Q6H PRN, Atif Don MD, 4 mg at 12/19/21 1038    0.9% sodium chloride infusion 250 mL, 250 mL, IntraVENous, PRN, Florencia, Luis Paulino MD    metoclopramide HCl (REGLAN) injection 5 mg, 5 mg, IntraVENous, Q12H, Atif Don MD, 5 mg at 12/21/21 2213    pantoprazole (PROTONIX) 40 mg in 0.9% sodium chloride 10 mL injection, 40 mg, IntraVENous, Q12H, Atif Don MD, 40 mg at 12/21/21 2213    morphine injection 1 mg, 1 mg, IntraVENous, Q4H PRN, Atif Don MD, 1 mg at 12/21/21 2214    morphine injection 0.5 mg, 0.5 mg, IntraVENous, Q4H PRN, Atif Don MD, 0.5 mg at 12/21/21 1153    [Held by provider] metoclopramide HCl (REGLAN) tablet 5 mg, 5 mg, Oral, BID, Atif Don MD, 5 mg at 12/19/21 3197    [Held by provider] pantoprazole (PROTONIX) tablet 40 mg, 40 mg, Oral, ACB&D, Atif Don MD, 40 mg at 12/19/21 0836    calcium carbonate (TUMS) chewable tablet 400 mg [elemental], 400 mg, Oral, QID PRN, Purveyor, Atoosa, ACNP, 400 mg at 12/18/21 2134    insulin lispro (HUMALOG) injection, , SubCUTAneous, AC&HS, Atif Don MD, 2 Units at 12/21/21 1630    oxyCODONE IR (ROXICODONE) tablet 5 mg, 5 mg, Oral, Q4H PRN, Purveyor, Atoosa, ACNP    oxyCODONE IR (ROXICODONE) tablet 10 mg, 10 mg, Oral, Q4H PRN, Purveyor, Atoosa, ACNP, 10 mg at 12/22/21 0008    diphenhydrAMINE (BENADRYL) capsule 25 mg, 25 mg, Oral, QHS PRN, Bertin Oliveira MD, 25 mg at 12/15/21 2120    balsam peru-castor oiL (VENELEX) ointment, , Topical, Q12H, Bertin Oliveira MD, Given at 12/21/21 2218    epoetin emilie-epbx (RETACRIT) injection 20,000 Units, 20,000 Units, SubCUTAneous, Q TUE, THU & SAT, Carlos Simpson MD, 20,000 Units at 12/21/21 2213    glucagon (GLUCAGEN) injection 1 mg, 1 mg, IntraMUSCular, PRN, Dafne Klein MD    dextrose (D50W) injection syrg 12.5-25 g, 12.5-25 g, IntraVENous, PRN, Dafne Klein MD, 25 g at 12/09/21 1719    simethicone (MYLICON) 53SN/9.3PW oral drops 80 mg, 1.2 mL, Oral, Multiple, Nadeen Gaitan MD    [Held by provider] calcium acetate(phosphat bind) (PHOSLO) capsule 1,334 mg, 2 Capsule, Per NG tube, TID WITH MEALS, Roxane Madrid MD    albuterol-ipratropium (DUO-NEB) 2.5 MG-0.5 MG/3 ML, 3 mL, Nebulization, Q4H PRN, Roxane Madrid MD    albumin human 25% (BUMINATE) solution 25 g, 25 g, IntraVENous, DIALYSIS PRN, Amara White MD, 25 g at 12/07/21 1318    alcohol 62% (NOZIN) nasal  1 Ampule, 1 Ampule, Topical, Q12H, Roxane Madrid MD, 1 Ampule at 12/21/21 2217    chlorhexidine (PERIDEX) 0.12 % mouthwash 15 mL, 15 mL, Oral, Q12H, Roxane Madrid MD, 15 mL at 12/21/21 2218    acetaminophen (TYLENOL) tablet 650 mg, 650 mg, Oral, Q6H PRN, Suad Benedict MD, 650 mg at 12/21/21 1505    [Held by provider] L.acidophilus-paracasei-S.thermophil-bifidobacter (RISAQUAD) 8 billion cell capsule, 1 Capsule, Oral, DAILY, Shweta Grimm MD, 1 Capsule at 12/05/21 0909    sodium chloride (NS) flush 5-40 mL, 5-40 mL, IntraVENous, Q8H, Shweta Grimm MD, 10 mL at 12/21/21 2218    sodium chloride (NS) flush 5-40 mL, 5-40 mL, IntraVENous, PRN, OrShweta Flores MD, 10 mL at 12/16/21 0815    polyethylene glycol (MIRALAX) packet 17 g, 17 g, Oral, DAILY PRN, Cristi Horner MD     Procedures: see electronic medical records for all procedures/Xrays and details which were not copied into this note but were reviewed prior to creation of Plan. Reviewed most current lab test results and cultures  YES  Reviewed most current radiology test results   YES  Review and summation of old records today    NO  Reviewed patient's current orders and MAR    YES  PMH/SH reviewed - no change compared to H&P  ________________________________________________________________________  Care Plan discussed with:    Comments   Patient x    Family      RN x    Care Manager     Consultant                        Multidiciplinary team rounds were held today with , nursing, pharmacist and clinical coordinator.   Patient's plan of care was discussed; medications were reviewed and discharge planning was addressed.      ________________________________________________________________________  Total NON critical care TIME:  33  Minutes    Total CRITICAL CARE TIME Spent:   Minutes non procedure based      Comments   >50% of visit spent in counseling and coordination of care x     This includes time during multidisciplinary rounds if indicated above   ________________________________________________________________________  Michel Hernandez MD

## 2021-12-22 NOTE — PROGRESS NOTES
Alert and oriented x4, assessments and VS completed, medications administered per [facility], pt has reports of pain, prn medication provided and effective, pt and walking in hallways with PT

## 2021-12-22 NOTE — INTERDISCIPLINARY ROUNDS
Interdisciplinary Rounds were completed on 12/22/21 for this patient. Rounds included nursing, clinical care leader, pharmacy, and case management. Plan of care discussed. See clinical pathway and/or care plan for interventions and desired outcomes.

## 2021-12-22 NOTE — PROGRESS NOTES
Nephrology Progress Note  Blaise Leblanc     [email or web address]  Phone - [phone]   Patient: Mari Blevins    YOB: 1975        Date- 12/22/2021   Admit Date: 11/27/2021  CC: Follow up for  esrd     IMPRESSION & PLAN:   · ESRD-- home HD 5 days per week- Follows up with Dr Dev Delvalle [facility]  · Covid 19 infection  · Hemorrhagic shock  · Acute blood loss anemia from esophageal bleed from esophagitis  · hyperkalemia  · Left arm swelling- s/p angioplasty of left subclavian vein  · Gram positive sepsis from dental abcess  · s/p lead extraction at [facility]  · Anemia of ckd  · Hx of renal tx in past times 2.  · Hypertension  · CAD  · Failed RTX times 2         H/o DVT, s/p ivc filter/ h/o HIT      PLAN-  · No hd today  · epogen for anemia  · Hd TTS schedule  · He can go to [facility] for out pt hd     Subjective: Interval History:   S/p hd yesterday  bp stable      Objective:   Vitals:    12/21/21 2200 12/22/21 0008 12/22/21 0738 12/22/21 1018   BP: 97/72 107/72 115/78 123/83   Pulse: 87 75 85    Resp:  19 18    Temp:  98.3 °F (36.8 °C) 97 °F (36.1 °C)    TempSrc:       SpO2: 99% 97% 100%    Weight:       Height:          12/21 0701 - 12/22 0700  In: 615.8 [I.V.:615.8]  Out: 2500   Last 3 Recorded Weights in this Encounter    12/17/21 0437 12/18/21 0232 12/21/21 0444   Weight: 61.5 kg (135 lb 9.6 oz) 63.4 kg (139 lb 12.8 oz) 61.3 kg (135 lb 1.6 oz)      Physical exam:   GEN: NAD  NECK- Supple  RESP: no distress  NEURO:non focal    Seen from outside of the room    Chart reviewed. Pertinent Notes reviewed.      Data Review :  Recent Labs     12/22/21  0151 12/21/21  0237 12/20/21  0604    139 140   K 4.1 5.0 5.7*    104 105   CO2 28 26 25   BUN 36* 78* 68*   CREA 4.90* 7.02* 5.89*   * 141* 119*   CA 7.2* 8.0* 8.4*   MG 2.0 2.4 2.7*   PHOS 4.9* 5.6* 5.7*     Recent Labs     12/22/21  0151 12/21/21  4995 12/20/21  0604   WBC 5.5 6.0 7.0   HGB 8.1* 8.4* 9.3*   HCT 26.2* 26.4* 29.8*   * 157 143*     No results for input(s): FE, TIBC, PSAT, FERR in the last 72 hours.    Medication list  reviewed  Current Facility-Administered Medications   Medication Dose Route Frequency    aluminum-magnesium hydroxide (MAALOX) oral suspension 15 mL  15 mL Oral QID PRN    nadoloL (CORGARD) tablet 20 mg  20 mg Oral DAILY    octreotide (SANDOSTATIN) 500 mcg in 0.9% sodium chloride 500 mL infusion  25 mcg/hr IntraVENous CONTINUOUS    ondansetron (ZOFRAN ODT) tablet 4 mg  4 mg Oral Q6H PRN    0.9% sodium chloride infusion 250 mL  250 mL IntraVENous PRN    metoclopramide HCl (REGLAN) injection 5 mg  5 mg IntraVENous Q12H    pantoprazole (PROTONIX) 40 mg in 0.9% sodium chloride 10 mL injection  40 mg IntraVENous Q12H    morphine injection 1 mg  1 mg IntraVENous Q4H PRN    morphine injection 0.5 mg  0.5 mg IntraVENous Q4H PRN    [Held by provider] metoclopramide HCl (REGLAN) tablet 5 mg  5 mg Oral BID    [Held by provider] pantoprazole (PROTONIX) tablet 40 mg  40 mg Oral ACB&D    calcium carbonate (TUMS) chewable tablet 400 mg [elemental]  400 mg Oral QID PRN    insulin lispro (HUMALOG) injection   SubCUTAneous AC&HS    oxyCODONE IR (ROXICODONE) tablet 5 mg  5 mg Oral Q4H PRN    oxyCODONE IR (ROXICODONE) tablet 10 mg  10 mg Oral Q4H PRN    diphenhydrAMINE (BENADRYL) capsule 25 mg  25 mg Oral QHS PRN    balsam peru-castor oiL (VENELEX) ointment   Topical Q12H    epoetin emilie-epbx (RETACRIT) injection 20,000 Units  20,000 Units SubCUTAneous Q TUE, THU & SAT    glucagon (GLUCAGEN) injection 1 mg  1 mg IntraMUSCular PRN    dextrose (D50W) injection syrg 12.5-25 g  12.5-25 g IntraVENous PRN    simethicone (MYLICON) 32NC/6.8TA oral drops 80 mg  1.2 mL Oral Multiple    [Held by provider] calcium acetate(phosphat bind) (PHOSLO) capsule 1,334 mg  2 Capsule Per NG tube TID WITH MEALS    albuterol-ipratropium (DUO-NEB) 2.5 MG-0.5 MG/3 ML  3 mL Nebulization Q4H PRN    albumin human 25% (BUMINATE) solution 25 g  25 g IntraVENous DIALYSIS PRN    alcohol 62% (NOZIN) nasal  1 Ampule  1 Ampule Topical Q12H    chlorhexidine (PERIDEX) 0.12 % mouthwash 15 mL  15 mL Oral Q12H    acetaminophen (TYLENOL) tablet 650 mg  650 mg Oral Q6H PRN    [Held by provider] L.acidophilus-paracasei-S.thermophil-bifidobacter (RISAQUAD) 8 billion cell capsule  1 Capsule Oral DAILY    sodium chloride (NS) flush 5-40 mL  5-40 mL IntraVENous Q8H    sodium chloride (NS) flush 5-40 mL  5-40 mL IntraVENous PRN    polyethylene glycol (MIRALAX) packet 17 g  17 g Oral DAILY PRN          Jenna Mera MD              [facility]  [facility] [place] [facility], [address], [address]  Phone - [phone]               Fax - [phone]

## 2021-12-22 NOTE — PROGRESS NOTES
1900 Bedside and Verbal shift change report given to 230 Lisa Alcala (oncoming nurse) by Yong Chesetr (offgoing nurse). Report included the following information SBAR, Kardex, [facility] and Cardiac Rhythm NSR.     2214 Patient reports dull ache, reports pain 8/10 admin Morphine 1 mg for patient discomfort. TRANSFER - OUT REPORT:    2346 Verbal report given to Mona RN (name) on Spike Vidal  being transferred to [place] (unit) for routine progression of care       Report consisted of patients Situation, Background, Assessment and   Recommendations(SBAR). Information from the following report(s) SBAR, Kardex, ED Summary, [facility] and Cardiac Rhythm NSR was reviewed with the receiving nurse. Lines:   Triple Lumen 12/19/21 Right Femoral (Active)   Central Line Being Utilized Yes 12/21/21 1759   Criteria for Appropriate Use Hemodynamically unstable, requiring monitoring lines, vasopressors, or volume resuscitation 12/21/21 1759   Site Assessment Clean, dry, & intact 12/21/21 1759   Infiltration Assessment 0 12/21/21 1759   Affected Extremity/Extremities Color distal to insertion site pink (or appropriate for race); Pulses palpable;Range of motion performed 12/21/21 1759   Date of Last Dressing Change 12/19/21 12/21/21 1759   Dressing Status Clean, dry, & intact 12/21/21 1759   Dressing Type Disk with Chlorhexadine gluconate (CHG); Tape;Transparent 12/21/21 1759   Action Taken Open ports on tubing capped 12/21/21 1759   Proximal Hub Color/Line Status Capped; White 12/21/21 1759   Positive Blood Return (Medial Site) Yes 12/21/21 1759   Medial Hub Color/Line Status Blue; Infusing 12/21/21 1759   Positive Blood Return (Lateral Site) Yes 12/21/21 1759   Distal Hub Color/Line Status Brown;Capped 12/21/21 1759   Positive Blood Return (Site #3) Yes 12/21/21 1759   Alcohol Cap Used Yes 12/21/21 1759       Peripheral IV 12/19/21 Right Forearm (Active)   Site Assessment Clean, dry, & intact 12/21/21 1759   Phlebitis Assessment 0 12/21/21 1759   Infiltration Assessment 0 12/21/21 1759   Dressing Status Clean, dry, & intact 12/21/21 1759   Dressing Type Tape;Transparent 12/21/21 1759   Hub Color/Line Status Capped;Pink 12/21/21 1759   Action Taken Open ports on tubing capped 12/21/21 0445   Alcohol Cap Used Yes 12/21/21 1759        Opportunity for questions and clarification was provided.       Patient transported with:   Monitor  Registered Nurse  Tech

## 2021-12-22 NOTE — PROGRESS NOTES
Problem: Infection - Risk of, Central Venous Catheter-Associated Bloodstream Infection  Goal: *Absence of infection signs and symptoms  12/21/2021 2116 by Abel Tyler RN  Outcome: Progressing Towards Goal  12/21/2021 2116 by Abel Tyler RN  Outcome: Progressing Towards Goal     Problem: Patient Education: Go to Patient Education Activity  Goal: Patient/Family Education  12/21/2021 2116 by Abel Tyler RN  Outcome: Progressing Towards Goal  12/21/2021 2116 by Abel Tyler RN  Outcome: Progressing Towards Goal

## 2021-12-22 NOTE — PROGRESS NOTES
GI PROGRESS NOTE  Steffany Sharif, LISA  [phone] NP in-hospital cell phone M-F until 4:30  After 5pm or on weekends, please call  for physician on call    NAME:Claude Inga Calamity :1975 GGT:[id number]   ATTG: Dr Wood Found PCP: Sally Wise MD Date/Time:  2021 1044AM  Primary GI: Dr. Milind Reyna:     Acute onset of Hematemesis - 2/2 ulcers in distal esophagus- severe   Gastric varices on EGD and CT scan  Acute Blood Loss anemia - hgb 8.1 today from 8.4 yesterday, previously down to 6.7  Melena - no more yesterday and today  Chronic thrombocytopenia -  stabalizing  On Warfarin and ASA 81mg - last dose 21PM    - EGD 21 noted some concern for gastric varices - on CT review does show to have some increase vasculature increasing risk of bleeding noting gastric varices  - started octreotide gtt 21 - will need 72 hours    CT abd/pel 21 : 1. No CTA evidence of acute gastrointestinal hemorrhage. 2. Fluid-filled slightly distended distal thoracic esophagus and stomach which  also contains high density material. Moderate fluid distention of the colon. No  bowel obstruction. 3. 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 No splenomegaly. 4. Small pleural effusions and bilateral interstitial/airspace opacities in the  lung bases. See separate CT chest report    EGD 21 : Esophagus:  Full of fresh blood and large clots. Eventually cleared and two ulcers (10 mm) and (5 mm) seen in distal esophagus at 42 cm. Very friable, but no active bleeding. Injected Epi 1 : 10,000 total of 8.5 cc into area around both ulcers. Stomach: Full of huge clots, so only about 20 % of mucosa seen. Clots in stomach are dark and look old. Cannot find pylorus. Duodenum: not entered due to blood obscuring    EGD 2021:  Esophagus:  Upper and mid esophagus appears normal. Distal esophagus looks diffusely ulcerated with large amount of old clot.  No active bleeding seen. Stomach: FULL of old blood, large clots. Cannot evaluate gastric mucosa. Pylorus found and patent. Scope was able to be advanced into duodenum  Duodenum: normal bulb and second portion. No ulcers seen    EGD 21: - Multiple superficial esophageal and gastric ulcers with no sign of recent bleeding. - Large amount of blood clot and clear liquid in the stomach. Suctioned. - Submucosal venous abnormality possibly consistent with a gastric varix/ venous abnormality was seen in the fundus. Two hemoclips placed near these abnormalities to jeane there location for possible fluoroscopic intervention.  - Normal duodenum     Bacteremia - Gram + sepsis with dental abscess  COVID+ -  Admitted 21  ESRD on dialysis  Chronic pain  HTN     Plan:     · Continue octreotide gtt - can discontinue after 72 hours tomorrow  · Continue nadolol 20mg daily  · Advanced to full liquid diet today  · Continue acid suppression  BID  · Added maalox PRN for antacid  · Follow CBC - transfuse with blood, plts, ffp as necessary  · Hold all anticoagulation  · Monitor for recurrent bleeding. · Symptomatic care per primary team     Plan of care discussed with Dr. Rodney Herrera.     This patient was seen and examined by me in a face-to-face visit today. I reviewed the medical record including lab work, imaging and other provider notes. I confirmed the interval history as described above. I spoke to the patient, discussing our findings and plans. I discussed this case in detail with LISA Richard. I formulated an updated  assessment of this patient and guided our treatment plan. I agree with the above progress note. I agree with the history, exam and assessment and plan as outlined in the note.  I would like to add the followinyo M with episodic recurrence of bleeding from esophageal ulcers and apparanet gastric varices as determined by EGD and CT review. Bening abd exam. Stable Hgb. Melena decreasing.   Plan:  1) Monitor for recurrent bleeding, 2) Allow FLD and t/c advancing tomorrow, 3) Serial H/H and transfuse PRN, 4) Will continue Octreotide gtt until tomorrow , 5) Conitnue nadolol 20mg PO every day. Roberto Pinto MD  Subjective:   Discussed with RN events overnight. Doing well today and up in chair, reports hearburn overnight but a bit better today with pantoprazole. Complaint Y/N Description   Abdominal Pain n    Hematemesis n    Hematochezia  None today   Melena n    Constipation n    Diarrhea n    Dyspepsia n    Dysphagia n    Jaundiced n    Nausea/vomiting n      Review of Systems:  Symptom Y/N Comments  Symptom Y/N Comments   Fever/Chills    Chest Pain     Cough    Headaches     Sputum    Joint Pain     SOB/ZAMUDIO    Pruritis/Rash     Tolerating Diet y clears  Other       Could NOT obtain due to:        Objective:   VITALS:   Last 24hrs VS reviewed since prior progress note. Most recent are:  Visit Vitals  /83   Pulse 85   Temp 97 °F (36.1 °C)   Resp 18   Ht 5' 7\" (1.702 m)   Wt 61.3 kg (135 lb 1.6 oz)   SpO2 100%   BMI 21.16 kg/m²       Intake/Output Summary (Last 24 hours) at 12/22/2021 1044  Last data filed at 12/21/2021 2038  Gross per 24 hour   Intake 615.83 ml   Output 2500 ml   Net -1884.17 ml     PHYSICAL EXAM:  General: WD, WN. Alert, cooperative, no acute distress    HEENT: NC, Atraumatic. Lungs:  CTA Bilaterally. No Wheezing/Rhonchi/Rales. Heart:  Regular  rhythm,  No murmur  Abdomen:  Soft, Non distended, Non tender. +Bowel sounds, no HSM  Extremities: BLE edema. Renal fistula. Neurologic:  Alert and oriented X 3. No acute neurological distress   Psych:   Good insight. Not anxious nor agitated.       Lab and Radiology Data Reviewed: (see below)    Medications Reviewed: (see below)  PMH/SH reviewed - no change compared to H&P  ________________________________________________________________________  Total time spent with patient: 20 minutes ________________________________________________________________________  Care Plan discussed with:  Patient y   Family     RN y - RN at bedside              Consultant: Luda Vee NP     Procedures: see electronic medical records for all procedures/Xrays and details which were not copied into this note but were reviewed prior to creation of Plan. LABS:  Recent Labs     12/22/21 0151 12/21/21  0237   WBC 5.5 6.0   HGB 8.1* 8.4*   HCT 26.2* 26.4*   * 157     Recent Labs     12/22/21 0151 12/21/21 0237 12/20/21  0604    139 140   K 4.1 5.0 5.7*    104 105   CO2 28 26 25   BUN 36* 78* 68*   CREA 4.90* 7.02* 5.89*   * 141* 119*   CA 7.2* 8.0* 8.4*   MG 2.0 2.4 2.7*   PHOS 4.9* 5.6* 5.7*     Recent Labs     12/22/21 0151 12/21/21  0237 12/20/21  0604   * 128* 136*   TP 6.0* 6.1* 6.2*   ALB 2.1* 2.2* 2.1*   GLOB 3.9 3.9 4.1*     Recent Labs     12/19/21  1139   INR 1.2*   PTP 12.3*      No results for input(s): FE, TIBC, PSAT, FERR in the last 72 hours. Lab Results   Component Value Date/Time    Folate 7.5 11/23/2011 11:26 AM     No results for input(s): PH, PCO2, PO2 in the last 72 hours. No results for input(s): CPK, CKMB in the last 72 hours.     No lab exists for component: TROPONINI  Lab Results   Component Value Date/Time    Color RED 11/16/2012 05:15 PM    Appearance OPAQUE 11/16/2012 05:15 PM    Specific gravity 1.020 11/16/2012 05:15 PM    Specific gravity 1.011 09/17/2012 01:35 AM    pH (UA) 8.0 11/16/2012 05:15 PM    Protein >300 (A) 11/16/2012 05:15 PM    Glucose 100 (A) 11/16/2012 05:15 PM    Ketone NEGATIVE  11/16/2012 05:15 PM    Bilirubin NEGATIVE  09/17/2012 01:35 AM    Urobilinogen 0.2 11/16/2012 05:15 PM    Nitrites NEGATIVE  11/16/2012 05:15 PM    Leukocyte Esterase NEGATIVE  11/16/2012 05:15 PM    Epithelial cells 5-10 11/16/2012 05:15 PM    Bacteria 3+ (A) 11/16/2012 05:15 PM    WBC >100 (H) 11/16/2012 05:15 PM    RBC >100 (H) 11/16/2012 05:15 PM MEDICATIONS:  Current Facility-Administered Medications   Medication Dose Route Frequency    nadoloL (CORGARD) tablet 20 mg  20 mg Oral DAILY    octreotide (SANDOSTATIN) 500 mcg in 0.9% sodium chloride 500 mL infusion  25 mcg/hr IntraVENous CONTINUOUS    ondansetron (ZOFRAN ODT) tablet 4 mg  4 mg Oral Q6H PRN    0.9% sodium chloride infusion 250 mL  250 mL IntraVENous PRN    metoclopramide HCl (REGLAN) injection 5 mg  5 mg IntraVENous Q12H    pantoprazole (PROTONIX) 40 mg in 0.9% sodium chloride 10 mL injection  40 mg IntraVENous Q12H    morphine injection 1 mg  1 mg IntraVENous Q4H PRN    morphine injection 0.5 mg  0.5 mg IntraVENous Q4H PRN    [Held by provider] metoclopramide HCl (REGLAN) tablet 5 mg  5 mg Oral BID    [Held by provider] pantoprazole (PROTONIX) tablet 40 mg  40 mg Oral ACB&D    calcium carbonate (TUMS) chewable tablet 400 mg [elemental]  400 mg Oral QID PRN    insulin lispro (HUMALOG) injection   SubCUTAneous AC&HS    oxyCODONE IR (ROXICODONE) tablet 5 mg  5 mg Oral Q4H PRN    oxyCODONE IR (ROXICODONE) tablet 10 mg  10 mg Oral Q4H PRN    diphenhydrAMINE (BENADRYL) capsule 25 mg  25 mg Oral QHS PRN    balsam peru-castor oiL (VENELEX) ointment   Topical Q12H    epoetin emilie-epbx (RETACRIT) injection 20,000 Units  20,000 Units SubCUTAneous Q TUE, THU & SAT    glucagon (GLUCAGEN) injection 1 mg  1 mg IntraMUSCular PRN    dextrose (D50W) injection syrg 12.5-25 g  12.5-25 g IntraVENous PRN    simethicone (MYLICON) 25ZJ/2.2QB oral drops 80 mg  1.2 mL Oral Multiple    [Held by provider] calcium acetate(phosphat bind) (PHOSLO) capsule 1,334 mg  2 Capsule Per NG tube TID WITH MEALS    albuterol-ipratropium (DUO-NEB) 2.5 MG-0.5 MG/3 ML  3 mL Nebulization Q4H PRN    albumin human 25% (BUMINATE) solution 25 g  25 g IntraVENous DIALYSIS PRN    alcohol 62% (NOZIN) nasal  1 Ampule  1 Ampule Topical Q12H    chlorhexidine (PERIDEX) 0.12 % mouthwash 15 mL  15 mL Oral Q12H    acetaminophen (TYLENOL) tablet 650 mg  650 mg Oral Q6H PRN    [Held by provider] L.acidophilus-paracasei-S.thermophil-bifidobacter (RISAQUAD) 8 billion cell capsule  1 Capsule Oral DAILY    sodium chloride (NS) flush 5-40 mL  5-40 mL IntraVENous Q8H    sodium chloride (NS) flush 5-40 mL  5-40 mL IntraVENous PRN    polyethylene glycol (MIRALAX) packet 17 g  17 g Oral DAILY PRN

## 2021-12-22 NOTE — PROGRESS NOTES
[facility] hospitalist Group                                                                                          Hospitalist Progress Note  Cayden Rush MD          Date of Service:  2021  NAME:  Christina Howell  :  1975  MRN:  [id number]      Admission Summary:   Eda Kiser is a 42 y.o.  male who presents with above complaints from home. Patient present with chief complaint of sudden onset fever with myalgia and extreme fatigue/flulike symptoms that started Wednesday as per patient. Patient has history of end-stage renal disease on dialysis , was recently diagnosed with Streptococcus intermedius bacteremia which was thought to be from ? Dental caries as source-was discharged from [facility] [address] on vancomycin with dialysis as outpatient.   Patient is unvaccinated for COVID-19 infection  Patient has history of childhood nephrotic syndrome after which he developed end-stage renal disease and was on immuno suppressants in the past     Patient was found to have rapid Covid test positive with elevated inflammatory markers including CRP, LDH, ferritin, lactic acid with unremarkable chest x-ray and CT abdomen and pelvis on work-up in ED. patient was mildly/borderline hypoxic in the ED requiring 2 L of oxygen via nasal cannula when seen by me in the ED.     We were asked to admit for work up and evaluation of the above problems.        Interval history / Subjective:   Remains on octreotide drip, hemoglobin has trended down to 8.1 from 8.4, no more clinical bleeding noticed at this point     Assessment & Plan:     Hemorrhagic shock  Upper GI bleed  Gastric varices   Thrombocytopenia   Ileus   Protein calorie malnutrition   transaminitis   EGD: distal esophageal ulcer with large clots in stomach  Repeat EGD: distal esophageal ulcer with blood in stomach  rd EGD on , Multiple superficial esophageal and gastric ulcers with no sign of recent bleeding, Large amount of blood clot and clear liquid in the stomach. Suctioned., Submucosal venous abnormality possibly consistent with a gastric varix/ venous abnormality was seen in the fundus. Two hemoclips placed near these abnormalities to jeane there location for possible fluoroscopic intervention. Hb stable, s/p transfusion of: 13 units PRBC, 3 units Plasma, 2 Cryoprecipitate, 3 units Plts  Gen [place] not need for surgical intervention now  Keep Holding Coumadin, will not prescribe Coumadin on discharge  Patient will likely need a repeat endoscopy next few months to determine the healing of ulcers versus before restarting Coumadin  Epogen   Improving oral intake. 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 placements)  hx MRSE bacteremia 10/2020, presumed from femoral HD cath with discitis L1-2  hx MRSA bacteremia 2017  Resp Cx w yeast   Cont Abx, completed Zosyn for possible aspiration   Completed steroid   Intubated for EGD, Extubated o 12/11  Fluid management with dialysis     ESRD on HD  Resume HD as per Nephro  Used to be on home HD  Pt now interested in going home with outpt HD     Recurrent DVT, On Coumadin, PTA  H/o HIT  S/p IVF filter   Holding AC      Acute on chronic systolic HF, POA, EF 18%  H/o V Tach  S/p AICD  CAD  sev P.HTN  Continue home meds     18.5 - 24.9 Normal weight / Body mass index is 21.16 kg/m².   Code status: Full  Prophylaxis: SCD's  Care Plan discussed with: Patient/Family RN, IDR   anticipated Disposition: SNF/LTC  Anticipated Discharge: 24 hours to 48 hours     Hospital Problems  Date Reviewed: 9/11/2020          Codes Class Noted POA    Goals of care, counseling/discussion ICD-10-CM: Z71.89  ICD-9-CM: V65.49  Unknown Unknown        DNR (do not resuscitate) discussion ICD-10-CM: Z71.89  ICD-9-CM: V65.49  Unknown Unknown        Need for emotional support ICD-10-CM: R45.89  ICD-9-CM: 799.29  Unknown Unknown        Acute COVID-19 ICD-10-CM: U07.1  ICD-9-CM: 079.89  11/27/2021 Unknown                Review of Systems:   A comprehensive review of systems was negative except for that written in the HPI. Vital Signs:    Last 24hrs VS reviewed since prior progress note. Most recent are:  Visit Vitals  /71   Pulse 88   Temp 99 °F (37.2 °C)   Resp 19   Ht 5' 7\" (1.702 m)   Wt 61.3 kg (135 lb 1.6 oz)   SpO2 99%   BMI 21.16 kg/m²         Intake/Output Summary (Last 24 hours) at 12/22/2021 1445  Last data filed at 12/21/2021 2038  Gross per 24 hour   Intake 615.83 ml   Output    Net 615.83 ml        Physical Examination:     I had a face to face encounter with this patient and independently examined them on 12/22/2021 as outlined below:          Constitutional:  No acute distress, cooperative, pleasant    ENT:  Oral mucosa moist, oropharynx benign. Resp:  CTA bilaterally. No wheezing/rhonchi/rales. No accessory muscle use   CV:  Regular rhythm, normal rate, no murmurs, gallops, rubs    GI:  Soft, non distended, non tender. normoactive bowel sounds, no hepatosplenomegaly     Musculoskeletal:  No edema, warm, 2+ pulses throughout    Neurologic:  Moves all extremities.   AAOx3, CN II-XII reviewed            Data Review:    Review and/or order of clinical lab test      Labs:     Recent Labs     12/22/21  0151 12/21/21  0237   WBC 5.5 6.0   HGB 8.1* 8.4*   HCT 26.2* 26.4*   * 157     Recent Labs     12/22/21  0151 12/21/21  0237 12/20/21  0604    139 140   K 4.1 5.0 5.7*    104 105   CO2 28 26 25   BUN 36* 78* 68*   CREA 4.90* 7.02* 5.89*   * 141* 119*   CA 7.2* 8.0* 8.4*   MG 2.0 2.4 2.7*   PHOS 4.9* 5.6* 5.7*     Recent Labs 12/22/21  0151 12/21/21  0237 12/20/21  0604   ALT 40 33 32   * 128* 136*   TBILI 0.8 0.5 0.6   TP 6.0* 6.1* 6.2*   ALB 2.1* 2.2* 2.1*   GLOB 3.9 3.9 4.1*     No results for input(s): INR, PTP, APTT, INREXT in the last 72 hours. No results for input(s): FE, TIBC, PSAT, FERR in the last 72 hours. Lab Results   Component Value Date/Time    Folate 7.5 11/23/2011 11:26 AM      No results for input(s): PH, PCO2, PO2 in the last 72 hours. No results for input(s): CPK, CKNDX, TROIQ in the last 72 hours.     No lab exists for component: CPKMB  Lab Results   Component Value Date/Time    Cholesterol, total 139 05/22/2012 05:00 PM    HDL Cholesterol 32 05/22/2012 05:00 PM    LDL, calculated 75.8 05/22/2012 05:00 PM    Triglyceride 298 (H) 12/09/2021 04:10 AM    CHOL/HDL Ratio 4.3 05/22/2012 05:00 PM     Lab Results   Component Value Date/Time    Glucose (POC) 137 (H) 12/22/2021 12:20 PM    Glucose (POC) 78 12/22/2021 09:02 AM    Glucose (POC) 72 12/21/2021 08:40 PM    Glucose (POC) 149 (H) 12/21/2021 04:14 PM    Glucose (POC) 86 12/21/2021 11:03 AM     Lab Results   Component Value Date/Time    Color RED 11/16/2012 05:15 PM    Appearance OPAQUE 11/16/2012 05:15 PM    Specific gravity 1.020 11/16/2012 05:15 PM    Specific gravity 1.011 09/17/2012 01:35 AM    pH (UA) 8.0 11/16/2012 05:15 PM    Protein >300 (A) 11/16/2012 05:15 PM    Glucose 100 (A) 11/16/2012 05:15 PM    Ketone NEGATIVE  11/16/2012 05:15 PM    Bilirubin NEGATIVE  09/17/2012 01:35 AM    Urobilinogen 0.2 11/16/2012 05:15 PM    Nitrites NEGATIVE  11/16/2012 05:15 PM    Leukocyte Esterase NEGATIVE  11/16/2012 05:15 PM    Epithelial cells 5-10 11/16/2012 05:15 PM    Bacteria 3+ (A) 11/16/2012 05:15 PM    WBC >100 (H) 11/16/2012 05:15 PM    RBC >100 (H) 11/16/2012 05:15 PM         Medications Reviewed:     Current Facility-Administered Medications   Medication Dose Route Frequency    aluminum-magnesium hydroxide (MAALOX) oral suspension 15 mL  15 mL Oral QID PRN    nadoloL (CORGARD) tablet 20 mg  20 mg Oral DAILY    octreotide (SANDOSTATIN) 500 mcg in 0.9% sodium chloride 500 mL infusion  25 mcg/hr IntraVENous CONTINUOUS    ondansetron (ZOFRAN ODT) tablet 4 mg  4 mg Oral Q6H PRN    0.9% sodium chloride infusion 250 mL  250 mL IntraVENous PRN    metoclopramide HCl (REGLAN) injection 5 mg  5 mg IntraVENous Q12H    pantoprazole (PROTONIX) 40 mg in 0.9% sodium chloride 10 mL injection  40 mg IntraVENous Q12H    morphine injection 1 mg  1 mg IntraVENous Q4H PRN    morphine injection 0.5 mg  0.5 mg IntraVENous Q4H PRN    [Held by provider] metoclopramide HCl (REGLAN) tablet 5 mg  5 mg Oral BID    [Held by provider] pantoprazole (PROTONIX) tablet 40 mg  40 mg Oral ACB&D    calcium carbonate (TUMS) chewable tablet 400 mg [elemental]  400 mg Oral QID PRN    insulin lispro (HUMALOG) injection   SubCUTAneous AC&HS    oxyCODONE IR (ROXICODONE) tablet 5 mg  5 mg Oral Q4H PRN    oxyCODONE IR (ROXICODONE) tablet 10 mg  10 mg Oral Q4H PRN    diphenhydrAMINE (BENADRYL) capsule 25 mg  25 mg Oral QHS PRN    balsam peru-castor oiL (VENELEX) ointment   Topical Q12H    epoetin emilie-epbx (RETACRIT) injection 20,000 Units  20,000 Units SubCUTAneous Q TUE, THU & SAT    glucagon (GLUCAGEN) injection 1 mg  1 mg IntraMUSCular PRN    dextrose (D50W) injection syrg 12.5-25 g  12.5-25 g IntraVENous PRN    simethicone (MYLICON) 96DD/8.3CM oral drops 80 mg  1.2 mL Oral Multiple    [Held by provider] calcium acetate(phosphat bind) (PHOSLO) capsule 1,334 mg  2 Capsule Per NG tube TID WITH MEALS    albuterol-ipratropium (DUO-NEB) 2.5 MG-0.5 MG/3 ML  3 mL Nebulization Q4H PRN    albumin human 25% (BUMINATE) solution 25 g  25 g IntraVENous DIALYSIS PRN    alcohol 62% (NOZIN) nasal  1 Ampule  1 Ampule Topical Q12H    chlorhexidine (PERIDEX) 0.12 % mouthwash 15 mL  15 mL Oral Q12H    acetaminophen (TYLENOL) tablet 650 mg  650 mg Oral Q6H PRN    [Held by provider] L.acidophilus-paracasei-S.thermophil-bifidobacter (RISAQUAD) 8 billion cell capsule  1 Capsule Oral DAILY    sodium chloride (NS) flush 5-40 mL  5-40 mL IntraVENous Q8H    sodium chloride (NS) flush 5-40 mL  5-40 mL IntraVENous PRN    polyethylene glycol (MIRALAX) packet 17 g  17 g Oral DAILY PRN     ______________________________________________________________________  EXPECTED LENGTH OF STAY: 4d 21h  ACTUAL LENGTH OF STAY:          [address] [place], [place]

## 2021-12-22 NOTE — PROGRESS NOTES
Physical Therapy     Chart reviewed and nurse cleared for therapy. Attempted to see pt but pt politely declining any therapy services at this time due to fatigue. Will re-attempt later today.     Ashwin Ramos PT, DPT, NCS

## 2021-12-22 NOTE — PROGRESS NOTES
Problem: Mobility Impaired (Adult and Pediatric)  Goal: *Acute Goals and Plan of Care (Insert Text)  Description: FUNCTIONAL STATUS PRIOR TO ADMISSION: Patient was independent and active without use of DME.    HOME SUPPORT PRIOR TO ADMISSION: The patient lived with sister. Physical Therapy Goals  Initiated 11/30/2021  1. Patient will move from supine to sit and sit to supine  in bed with independence within 7 day(s). 2.  Patient will transfer from bed to chair and chair to bed with modified independence using the least restrictive device within 7 day(s). 3.  Patient will perform sit to stand with modified independence within 7 day(s). 4.  Patient will ambulate with modified independence for 100 feet with the least restrictive device within 7 day(s). Reviewed 12/15- goals remain appropriate. Reviewed 12/22 - goals remain appropriate. Pt currently Mod I-S for mobility, unsteady gait, extra time/rest breaks needed. Outcome: Progressing Towards Goal   PHYSICAL THERAPY REEVALUATION  Patient: Cresencio Turpin (31 y.o. male)  Date: 12/22/2021  Primary Diagnosis: Acute COVID-19 [U07.1]  Procedure(s) (LRB):  ESOPHAGOGASTRODUODENOSCOPY (EGD) WITH RESOLUTION CLIP  (N/A)  RESOLUTION CLIP (N/A) 3 Days Post-Op   Precautions:  Contact,Fall,Skin (COVID)      ASSESSMENT  Based on the objective data described below, the patient presents with impairments in overall strength, endurance, impaired balance and abnormal gait. Pt received seated in recliner chair, reporting walking earlier >1000' with nursing. Pt agreeable to PT session, performed transfers Mod I with extra time and no device. Gait up to 800' with no AD initially, requiring B UEs on IV pole for steadying and S as gait progressed. 2 standing breaks 2/2 fatigue. Recommend RW trial next session for possible use in home as pt reported considering HH rather than IPR at this time.  Pt continues to benefit from therapy services and will continue to follow. Current Level of Function Impacting Discharge (mobility/balance): S vs. Mod I with extra time. Functional Outcome Measure: The patient scored 80/100 on the Barthel Index outcome measure which is indicative of mild impairments in functional mobility and ADLs. Other factors to consider for discharge: Pt lives with sister who is his HD partner     Patient will benefit from skilled therapy intervention to address the above noted impairments. PLAN :  Recommendations and Planned Interventions: bed mobility training, transfer training, gait training, therapeutic exercises, neuromuscular re-education, edema management/control, patient and family training/education and therapeutic activities      Frequency/Duration: Patient will be followed by physical therapy:  4 times a week to address goals. Recommendation for discharge: (in order for the patient to meet his/her long term goals)  To be determined: HH PT vs. IP Rehab    This discharge recommendation:  Has been made in collaboration with the attending provider and/or case management    Equipment recommendations for successful discharge (if) home: walker: rolling vs. Rollator         SUBJECTIVE:   Patient stated Sure, we can walk  again.     OBJECTIVE DATA SUMMARY:   HISTORY:    Past Medical History:   Diagnosis Date    Abdominal hematoma     AICD (automatic cardioverter/defibrillator) present     CAD (coronary artery disease)     anterior MI s/p 2 stents  2007    Chronic abdominal pain     Chronic kidney disease     ESRD; Dialysis dependent.  Home [facility] does labs- [facility] tpke    DVT (deep venous thrombosis) ([address] 2001    DVT of popliteal vein ([address] 11/2011    not anticoagulated due to bleeding, IVC filter    Endocarditis     Gallstone pancreatitis     Gastrointestinal disorder     acid reflux    Gastrointestinal disorder     peptic ulcer    Hemodialysis patient ([address]     High cholesterol     Hypertension     Kidney transplant     b/l kidneys 1995, 2001    Long term current use of anticoagulant therapy     Nephrotic syndrome     Other ill-defined conditions(799.89)     kidny transplant x2,  on dialysis    Other ill-defined conditions(799.89)     home dialysis    Other ill-defined conditions(799.89)     hx recurrent left leg DVT    Peritonitis ([address]     Seizures ([facility] 2015    most recent- only had three in lifetime    Small bowel obstruction (HCC)     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[facility]     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    V-tach ([facility]      Past Surgical History:   Procedure Laterality Date    HX APPENDECTOMY      HX CHOLECYSTECTOMY      HX OTHER SURGICAL  11/2011    exploratory laparotomy    HX OTHER SURGICAL      fem-pop    HX OTHER SURGICAL  02/2013    right upper extremity fistula    HX PACEMAKER Left 6/2009    AICD-st latonya-not connected    HX PACEMAKER Left     AICD-left side-BS-working    HX SMALL BOWEL RESECTION      HX TRANSPLANT  2001     Kidney    HX TRANSPLANT  1992    kidney    HX VASCULAR ACCESS Right     femoral access for HD- does 5 day dialysis @ home    IR INSERT NON TUNL CVC OVER 5 YRS  12/7/2021    IR INSERT NON TUNL CVC OVER 5 YRS  12/10/2021    IR REMOVE TUNL CVAD W/O PORT / PUMP  10/29/2020    IR REPLACE CVC TUNNELED W/O PORT  9/10/2020    [place] CARDIAC SURG PROCEDURE UNLIST  2007    2 stents    [place] EDG US EXAM SURGICAL ALTER STOM DUODENUM/JEJUNUM  7/15/2011         [place] ESOPHAGOGASTRODUODENOSCOPY TRANSORAL DIAGNOSTIC  5/24/2012         UPPER GI ENDOSCOPY,CTRL BLEED  12/6/2021         UPPER GI ENDOSCOPY,DIAGNOSIS  12/8/2021         VASCULAR SURGERY PROCEDURE UNLIST  11/14/2017    Insertion AV graft right upper arm (bovine); ligation of AV fistula right arm     Hospital course since last seen and reason for reevaluation: Goals addressed, pt progressing towards DC with mobility.     Personal factors and/or comorbidities impacting plan of care: complex medical history, home HD needs    Home Situation  Home Environment: Private residence  # Steps to Enter: 4  Rails to Enter: Yes  Hand Rails : Right  One/Two Story Residence: Two story  # of Interior Steps: 21  Interior Rails: Both  Living Alone: No  Support Systems: Other Family Member(s) (Pt lives with his sister in a duplex house has 20 steps to enter )  Patient Expects to be Discharged to[de-identified] House  Current DME Used/Available at Home: None  Tub or Shower Type: Tub/Shower combination    EXAMINATION/PRESENTATION/DECISION MAKING:   Critical Behavior:  Neurologic State: Alert  Orientation Level: Oriented X4  Cognition: Appropriate decision making,Appropriate for age attention/concentration,Appropriate safety awareness  Safety/Judgement: Awareness of environment  Hearing: Auditory  Auditory Impairment: None  Skin:  Appears intact  Edema: B LE edema pt reports WNL. \"I've been drinking fluids since dialysis yesterday. I can tell with the fluid\"  Range Of Motion:                          Strength: Tone & Sensation:                                  Coordination:     Vision:      Functional Mobility:  Bed Mobility:              Transfers:  Sit to Stand: Modified independent  Stand to Sit: Modified independent                       Balance:   Sitting: Intact  Standing: Intact  Standing - Static: Good; Unsupported  Standing - Dynamic : Constant support;Good  Ambulation/Gait Training:  Distance (ft): 1000 Feet (ft)  Assistive Device: Gait belt (B UE on IV pole for steadying)  Ambulation - Level of Assistance: Supervision        Gait Abnormalities: Decreased step clearance;Shuffling gait        Base of Support: Narrowed     Speed/Bernarda: Pace decreased (<100 feet/min); Slow  Step Length: Left shortened;Right shortened             Therapeutic Exercises:   Declined further intervention and therex 2/2  Fatigue.     Functional Measure:  Barthel Index:    Bathin  Bladder: 10  Bowels: 10  Groomin  Dressing: 10  Feeding: 10  Mobility: 10  Stairs: 5  Toilet Use: 10  Transfer (Bed to Chair and Back): 10  Total: 80/100       The Barthel ADL Index: Guidelines  1. The index should be used as a record of what a patient does, not as a record of what a patient could do. 2. The main aim is to establish degree of independence from any help, physical or verbal, however minor and for whatever reason. 3. The need for supervision renders the patient not independent. 4. A patient's performance should be established using the best available evidence. Asking the patient, friends/relatives and nurses are the usual sources, but direct observation and common sense are also important. However direct testing is not needed. 5. Usually the patient's performance over the preceding 24-48 hours is important, but occasionally longer periods will be relevant. 6. Middle categories imply that the patient supplies over 50 per cent of the effort. 7. Use of aids to be independent is allowed. Score Interpretation (from [address] [facility] [address])    Independent   60-79 Minimally independent   40-59 Partially dependent   20-39 Very dependent   <20 Totally dependent     -Edyta Harden., Barthel, D.W. (1965). Functional evaluation: the Barthel Index. [facility] ([address] (1997). The Barthel activities of daily living index: self-reporting versus actual performance in the old (> or = 75 years). Journal of [address] [place] [address] 45(7), [facility], J.USHA, [facility]., [facility]. (1999). Measuring the change in disability after inpatient rehabilitation; comparison of the responsiveness of the Barthel Index and Functional Banks Measure. Journal of Neurology, Neurosurgery, and Psychiatry, 66(4), 935-110. Valentina Mckenzie, N.J.A, GAMAL Payton, & RAINER BobA. (2004) Assessment of post-stroke quality of life in cost-effectiveness studies: The usefulness of the Barthel Index and the EuroQoL-5D.  Quality of Life Research, 13, 427-82            Pain Rating:  None. Activity Tolerance:   Fair, SpO2 stable on RA and requires rest breaks    After treatment patient left in no apparent distress:   Sitting in chair and Call bell within reach    COMMUNICATION/EDUCATION:   The patients plan of care was discussed with: Registered nurse. Fall prevention education was provided and the patient/caregiver indicated understanding.     Thank you for this referral.  Amy Sheehan, PT, DPT, NCS   Time Calculation: 17 mins

## 2021-12-23 LAB
ANION GAP SERPL CALC-SCNC: 10 MMOL/L (ref 5–15)
BASOPHILS # BLD: 0.1 K/UL (ref 0–0.1)
BASOPHILS NFR BLD: 2 % (ref 0–1)
BUN SERPL-MCNC: 39 MG/DL (ref 6–20)
BUN/CREAT SERPL: 6 (ref 12–20)
CALCIUM SERPL-MCNC: 7.1 MG/DL (ref 8.5–10.1)
CHLORIDE SERPL-SCNC: 101 MMOL/L (ref 97–108)
CO2 SERPL-SCNC: 24 MMOL/L (ref 21–32)
COMMENT, HOLDF: NORMAL
CREAT SERPL-MCNC: 6.21 MG/DL (ref 0.7–1.3)
DIFFERENTIAL METHOD BLD: ABNORMAL
EOSINOPHIL # BLD: 0.7 K/UL (ref 0–0.4)
EOSINOPHIL NFR BLD: 11 % (ref 0–7)
ERYTHROCYTE [DISTWIDTH] IN BLOOD BY AUTOMATED COUNT: 19.1 % (ref 11.5–14.5)
GLUCOSE BLD STRIP.AUTO-MCNC: 107 MG/DL (ref 65–117)
GLUCOSE BLD STRIP.AUTO-MCNC: 122 MG/DL (ref 65–117)
GLUCOSE BLD STRIP.AUTO-MCNC: 46 MG/DL (ref 65–117)
GLUCOSE BLD STRIP.AUTO-MCNC: 58 MG/DL (ref 65–117)
GLUCOSE BLD STRIP.AUTO-MCNC: 74 MG/DL (ref 65–117)
GLUCOSE SERPL-MCNC: 81 MG/DL (ref 65–100)
HCT VFR BLD AUTO: 28.9 % (ref 36.6–50.3)
HGB BLD-MCNC: 8.9 G/DL (ref 12.1–17)
IMM GRANULOCYTES # BLD AUTO: 0 K/UL (ref 0–0.04)
IMM GRANULOCYTES NFR BLD AUTO: 0 % (ref 0–0.5)
LYMPHOCYTES # BLD: 1.2 K/UL (ref 0.8–3.5)
LYMPHOCYTES NFR BLD: 18 % (ref 12–49)
MAGNESIUM SERPL-MCNC: 2.2 MG/DL (ref 1.6–2.4)
MCH RBC QN AUTO: 29.6 PG (ref 26–34)
MCHC RBC AUTO-ENTMCNC: 30.8 G/DL (ref 30–36.5)
MCV RBC AUTO: 96 FL (ref 80–99)
MONOCYTES # BLD: 0.9 K/UL (ref 0–1)
MONOCYTES NFR BLD: 13 % (ref 5–13)
NEUTS SEG # BLD: 3.8 K/UL (ref 1.8–8)
NEUTS SEG NFR BLD: 56 % (ref 32–75)
NRBC # BLD: 0 K/UL (ref 0–0.01)
NRBC BLD-RTO: 0 PER 100 WBC
PHOSPHATE SERPL-MCNC: 6.2 MG/DL (ref 2.6–4.7)
PLATELET # BLD AUTO: 148 K/UL (ref 150–400)
PMV BLD AUTO: 11.3 FL (ref 8.9–12.9)
POTASSIUM SERPL-SCNC: 4.5 MMOL/L (ref 3.5–5.1)
RBC # BLD AUTO: 3.01 M/UL (ref 4.1–5.7)
SAMPLES BEING HELD,HOLD: NORMAL
SERVICE CMNT-IMP: ABNORMAL
SERVICE CMNT-IMP: NORMAL
SERVICE CMNT-IMP: NORMAL
SODIUM SERPL-SCNC: 135 MMOL/L (ref 136–145)
WBC # BLD AUTO: 6.7 K/UL (ref 4.1–11.1)

## 2021-12-23 PROCEDURE — 74011250636 HC RX REV CODE- 250/636: Performed by: INTERNAL MEDICINE

## 2021-12-23 PROCEDURE — 74011250637 HC RX REV CODE- 250/637: Performed by: HOSPITALIST

## 2021-12-23 PROCEDURE — 97116 GAIT TRAINING THERAPY: CPT

## 2021-12-23 PROCEDURE — 97535 SELF CARE MNGMENT TRAINING: CPT

## 2021-12-23 PROCEDURE — 85025 COMPLETE CBC W/AUTO DIFF WBC: CPT

## 2021-12-23 PROCEDURE — 36415 COLL VENOUS BLD VENIPUNCTURE: CPT

## 2021-12-23 PROCEDURE — 74011250636 HC RX REV CODE- 250/636: Performed by: GENERAL ACUTE CARE HOSPITAL

## 2021-12-23 PROCEDURE — C9113 INJ PANTOPRAZOLE SODIUM, VIA: HCPCS | Performed by: GENERAL ACUTE CARE HOSPITAL

## 2021-12-23 PROCEDURE — 74011250637 HC RX REV CODE- 250/637: Performed by: NURSE PRACTITIONER

## 2021-12-23 PROCEDURE — 74011000250 HC RX REV CODE- 250: Performed by: INTERNAL MEDICINE

## 2021-12-23 PROCEDURE — 83735 ASSAY OF MAGNESIUM: CPT

## 2021-12-23 PROCEDURE — 74011000250 HC RX REV CODE- 250: Performed by: GENERAL ACUTE CARE HOSPITAL

## 2021-12-23 PROCEDURE — 84100 ASSAY OF PHOSPHORUS: CPT

## 2021-12-23 PROCEDURE — 82962 GLUCOSE BLOOD TEST: CPT

## 2021-12-23 PROCEDURE — 90935 HEMODIALYSIS ONE EVALUATION: CPT

## 2021-12-23 PROCEDURE — 65660000000 HC RM CCU STEPDOWN

## 2021-12-23 PROCEDURE — 74011250637 HC RX REV CODE- 250/637: Performed by: INTERNAL MEDICINE

## 2021-12-23 PROCEDURE — 80048 BASIC METABOLIC PNL TOTAL CA: CPT

## 2021-12-23 RX ADMIN — Medication: at 09:00

## 2021-12-23 RX ADMIN — CALCIUM ACETATE 1334 MG: 667 CAPSULE ORAL at 16:30

## 2021-12-23 RX ADMIN — METOCLOPRAMIDE 5 MG: 5 INJECTION, SOLUTION INTRAMUSCULAR; INTRAVENOUS at 12:01

## 2021-12-23 RX ADMIN — SODIUM CHLORIDE 40 MG: 9 INJECTION, SOLUTION INTRAMUSCULAR; INTRAVENOUS; SUBCUTANEOUS at 12:01

## 2021-12-23 RX ADMIN — OXYCODONE 5 MG: 5 TABLET ORAL at 19:50

## 2021-12-23 RX ADMIN — MORPHINE SULFATE 1 MG: 2 INJECTION, SOLUTION INTRAMUSCULAR; INTRAVENOUS at 07:21

## 2021-12-23 RX ADMIN — MORPHINE SULFATE 1 MG: 2 INJECTION, SOLUTION INTRAMUSCULAR; INTRAVENOUS at 17:35

## 2021-12-23 RX ADMIN — MORPHINE SULFATE 1 MG: 2 INJECTION, SOLUTION INTRAMUSCULAR; INTRAVENOUS at 12:45

## 2021-12-23 RX ADMIN — ALUMINUM HYDROXIDE AND MAGNESIUM HYDROXIDE 15 ML: 200; 200 SUSPENSION ORAL at 06:47

## 2021-12-23 RX ADMIN — Medication 10 ML: at 06:04

## 2021-12-23 RX ADMIN — Medication: at 22:35

## 2021-12-23 RX ADMIN — Medication 1 AMPULE: at 12:10

## 2021-12-23 RX ADMIN — MORPHINE SULFATE 1 MG: 2 INJECTION, SOLUTION INTRAMUSCULAR; INTRAVENOUS at 03:20

## 2021-12-23 RX ADMIN — SODIUM CHLORIDE 40 MG: 9 INJECTION, SOLUTION INTRAMUSCULAR; INTRAVENOUS; SUBCUTANEOUS at 22:26

## 2021-12-23 RX ADMIN — ALUMINUM HYDROXIDE AND MAGNESIUM HYDROXIDE 15 ML: 200; 200 SUSPENSION ORAL at 22:07

## 2021-12-23 RX ADMIN — Medication 10 ML: at 14:00

## 2021-12-23 RX ADMIN — OXYCODONE 10 MG: 5 TABLET ORAL at 15:33

## 2021-12-23 RX ADMIN — Medication 10 ML: at 22:27

## 2021-12-23 RX ADMIN — CHLORHEXIDINE GLUCONATE 15 ML: 1.2 RINSE ORAL at 22:07

## 2021-12-23 RX ADMIN — Medication 1 AMPULE: at 22:30

## 2021-12-23 RX ADMIN — EPOETIN ALFA-EPBX 20000 UNITS: 20000 INJECTION, SOLUTION INTRAVENOUS; SUBCUTANEOUS at 22:07

## 2021-12-23 RX ADMIN — NADOLOL 20 MG: 40 TABLET ORAL at 12:01

## 2021-12-23 RX ADMIN — MORPHINE SULFATE 1 MG: 2 INJECTION, SOLUTION INTRAMUSCULAR; INTRAVENOUS at 22:26

## 2021-12-23 NOTE — PROCEDURES
Hemodialysis / [phone]    Vitals Pre Post Assessment Pre Post   BP BP: 117/73 (12/23/21 0800) 115/70 LOC A & O x 4 No change   HR Pulse (Heart Rate): 71 (12/23/21 0800) 63 Lungs Clear No change   Resp Resp Rate: 16 (12/23/21 0800) 16 Cardiac S1S2 No change   Temp Temp: 98.3 °F (36.8 °C) (12/23/21 0745) 98.4 Skin Warm dry & intact No change   Weight Pre-Dialysis Weight: 66.7 kg (147 lb) (12/02/21 0743) n/a Edema +2 lower ext No change   Tele status yes yes Pain Pain Intensity 1: 4 (12/23/21 0420) NO change     Orders   Duration: Start: 3521 End: 1115 Total: 3.5   Dialyzer: Dialyzer/Set Up Inspection: Sachi Feliz (12/23/21 0745)   K Bath: Dialysate K (mEq/L): 2 (12/23/21 0745)   Ca Bath: Dialysate CA (mEq/L): 2.5 (12/23/21 0745)   Na: Dialysate NA (mEq/L): 140 (12/23/21 0745)   Bicarb: Dialysate HCO3 (mEq/L): 35 (12/23/21 0745)   Target Fluid Removal: Goal/Amount of Fluid to Remove (mL): 2500 mL (12/23/21 0745)     Access  AVG   Type & Location: Left upper arm AVG   Comments:   Cannulated x2 with 15g needles without incident.  Blood flow 400                                     Labs   HBsAg (Antigen) / date:         Neg 12001/21                                      HBsAb (Antibody) / date: Imm 11/11/21   Source: Epic   Obtained/Reviewed  Critical Results Called HGB   Date Value Ref Range Status   12/23/2021 8.9 (L) 12.1 - 17.0 g/dL Final     Potassium   Date Value Ref Range Status   12/23/2021 4.5 3.5 - 5.1 mmol/L Final     Calcium   Date Value Ref Range Status   12/23/2021 7.1 (L) 8.5 - 10.1 MG/DL Final     BUN   Date Value Ref Range Status   12/23/2021 39 (H) 6 - 20 MG/DL Final     Creatinine   Date Value Ref Range Status   12/23/2021 6.21 (H) 0.70 - 1.30 MG/DL Final        Meds Given   Name Dose Route                    Adequacy / Fluid    Total Liters Process: 69.9   Net Fluid Removed: 2500ml      Comments   Time Out Done:   (Time) 9116   Admitting Diagnosis: · Covid 19 infection  · Hemorrhagic shock  · Acute blood loss anemia from esophageal bleed from esophagitis  · hyperkalemia   Consent obtained/signed: Informed Consent Verified: Yes (12/23/21 0745)   Machine / RO # Machine Number: Halle Garrett (12/23/21 0745)   Primary Nurse Rpt Pre: Nia Burroughs   Primary Nurse Rpt Post: Sophie Ralph RN   Pt Education: Access care   Care Plan: Continue HD   Pts outpatient clinic: Home HD     Tx Summary SUZANNE AVF: skin CDI. No s/s of infection. + B/T. No issues with cannulation or hemostasis. Running well at . Pt arrived to HD [address]. Consent signed & on file. SBAR received from Primary RN. 0745: Pt cannulated with 18Q needles per policy & without issue. Labs drawn per request/ order. VSS. Dialysis Tx initiated. 0815: Pt resting quietly. Lines secure and visible  0845: pt. Stable,line secure  0915: pt. Resting well. Lines secure  0945: pt. Stable,lines secure  1015: Tele informed me that pt. V-tach on HD. Pt. Denies pain or distress. 1045: pt. Stable, lines secure   1115: Tx ended. VSS. All possible blood returned to patient. Hemostasis achieved without issue. Bed locked and in the lowest position, call bell and belongings in reach. SBAR given to Primary, RN. Patient is stable at time of their/ my departure. All Dialysis related medications have been reviewed. Comments:         RN reviewed LPN assessment and completed RN assessment. RN completed patient assessment. RN reviewed technicians vital signs and procedure note. Tx completed. Reviewed by MANUELITO Ac

## 2021-12-23 NOTE — PROGRESS NOTES
[facility]. hospitalist Group                                                                                          Hospitalist Progress Note  Jerardo Ruiz MD          Date of Service:  2021  NAME:  Breanna Silvestre  :  1975  MRN:  [id number]      Admission Summary:   Jose Amador is a 42 y.o.  male who presents with above complaints from home. Patient present with chief complaint of sudden onset fever with myalgia and extreme fatigue/flulike symptoms that started Wednesday as per patient. Patient has history of end-stage renal disease on dialysis , was recently diagnosed with Streptococcus intermedius bacteremia which was thought to be from ? Dental caries as source-was discharged from [facility] [address] on vancomycin with dialysis as outpatient.   Patient is unvaccinated for COVID-19 infection  Patient has history of childhood nephrotic syndrome after which he developed end-stage renal disease and was on immuno suppressants in the past     Patient was found to have rapid Covid test positive with elevated inflammatory markers including CRP, LDH, ferritin, lactic acid with unremarkable chest x-ray and CT abdomen and pelvis on work-up in ED. patient was mildly/borderline hypoxic in the ED requiring 2 L of oxygen via nasal cannula when seen by me in the ED.     We were asked to admit for work up and evaluation of the above problems.        Interval history / Subjective:   Remains on octreotide drip, hemoglobin has trended down to 8.1 from 8.9, no more clinical bleeding noticed at this point     Assessment & Plan:     Hemorrhagic shock  Upper GI bleed  Gastric varices   Thrombocytopenia   Ileus   Protein calorie malnutrition   transaminitis   EGD: distal esophageal ulcer with large clots in stomach  Repeat EGD: distal esophageal ulcer with blood in stomach  rd EGD on , Multiple superficial esophageal and gastric ulcers with no sign of recent bleeding, Large amount of blood clot and clear liquid in the stomach. Suctioned., Submucosal venous abnormality possibly consistent with a gastric varix/ venous abnormality was seen in the fundus. Two hemoclips placed near these abnormalities to jeane there location for possible fluoroscopic intervention. Hb stable, s/p transfusion of: 13 units PRBC, 3 units Plasma, 2 Cryoprecipitate, 3 units Plts  Gen Cheko not need for surgical intervention now  Keep Holding Coumadin, will not prescribe Coumadin on discharge  Patient will likely need a repeat endoscopy next few months to determine the healing of ulcers versus before restarting Coumadin  Epogen   Improving oral intake. Off TPN now  PPI BID IV  Reglan  IV  Octreotide as per GI, completing today  Started Nadolol continue with that  CBC monitoring   Advance diet as tolerated     B/l pleural effusion  Covid Pna, > 20 days, Off isolation now   B/l Pna  Sepsis   Recent Streptococcus intermedius bacteremia (Nov 2021)  Hx presumed endovascular ICD infection (S/P 2 AICD placements)  hx MRSE bacteremia 10/2020, presumed from femoral HD cath with discitis L1-2  hx MRSA bacteremia 2017  Resp Cx w yeast   Cont Abx, completed Zosyn for possible aspiration   Completed steroid   Intubated for EGD, Extubated o 12/11  Fluid management with dialysis     ESRD on HD  Resume HD as per Nephro  Used to be on home HD  Pt now interested in going home with outpt HD     Recurrent DVT, On Coumadin, PTA  H/o HIT  S/p IVF filter   Holding AC      Acute on chronic systolic HF, POA, EF 61%  H/o V Tach  S/p AICD  CAD  sev P.HTN  Continue home meds     18.5 - 24.9 Normal weight / Body mass index is 21.16 kg/m².   Code status: Full  Prophylaxis: SCD's  Care Plan discussed with: Patient/Family RN, IDR   anticipated Disposition: Home with home health  Anticipated Discharge: 24 hours to 48 hours     Likely DC home with home health tomorrow     Hospital Problems  Date Reviewed: 9/11/2020 Codes Class Noted POA    Goals of care, counseling/discussion ICD-10-CM: Z71.89  ICD-9-CM: V65.49  Unknown Unknown        DNR (do not resuscitate) discussion ICD-10-CM: Z71.89  ICD-9-CM: V65.49  Unknown Unknown        Need for emotional support ICD-10-CM: R45.89  ICD-9-CM: 799.29  Unknown Unknown        Acute COVID-19 ICD-10-CM: U07.1  ICD-9-CM: 079.89  11/27/2021 Unknown                Review of Systems:   A comprehensive review of systems was negative except for that written in the HPI. Vital Signs:    Last 24hrs VS reviewed since prior progress note. Most recent are:  Visit Vitals  /74   Pulse 79   Temp 98.4 °F (36.9 °C) (Oral)   Resp 19   Ht 5' 7\" (1.702 m)   Wt 61.3 kg (135 lb 1.6 oz)   SpO2 98%   BMI 21.16 kg/m²         Intake/Output Summary (Last 24 hours) at 12/23/2021 1312  Last data filed at 12/23/2021 1115  Gross per 24 hour   Intake    Output 2500 ml   Net -2500 ml        Physical Examination:     I had a face to face encounter with this patient and independently examined them on 12/23/2021 as outlined below:          Constitutional:  No acute distress, cooperative, pleasant    ENT:  Oral mucosa moist, oropharynx benign. Resp:  CTA bilaterally. No wheezing/rhonchi/rales. No accessory muscle use   CV:  Regular rhythm, normal rate, no murmurs, gallops, rubs    GI:  Soft, non distended, non tender. normoactive bowel sounds, no hepatosplenomegaly     Musculoskeletal:  No edema, warm, 2+ pulses throughout    Neurologic:  Moves all extremities.   AAOx3, CN II-XII reviewed            Data Review:    Review and/or order of clinical lab test      Labs:     Recent Labs     12/23/21 0327 12/22/21  0151   WBC 6.7 5.5   HGB 8.9* 8.1*   HCT 28.9* 26.2*   * 148*     Recent Labs     12/23/21  0327 12/22/21  0151 12/21/21  0237   * 136 139   K 4.5 4.1 5.0    101 104   CO2 24 28 26   BUN 39* 36* 78*   CREA 6.21* 4.90* 7.02*   GLU 81 145* 141*   CA 7.1* 7.2* 8.0*   MG 2.2 2.0 2.4   PHOS 6. 2* 4.9* 5.6*     Recent Labs     12/22/21  0151 12/21/21  0237   ALT 40 33   * 128*   TBILI 0.8 0.5   TP 6.0* 6.1*   ALB 2.1* 2.2*   GLOB 3.9 3.9     No results for input(s): INR, PTP, APTT, INREXT, INREXT in the last 72 hours. No results for input(s): FE, TIBC, PSAT, FERR in the last 72 hours. Lab Results   Component Value Date/Time    Folate 7.5 11/23/2011 11:26 AM      No results for input(s): PH, PCO2, PO2 in the last 72 hours. No results for input(s): CPK, CKNDX, TROIQ in the last 72 hours.     No lab exists for component: CPKMB  Lab Results   Component Value Date/Time    Cholesterol, total 139 05/22/2012 05:00 PM    HDL Cholesterol 32 05/22/2012 05:00 PM    LDL, calculated 75.8 05/22/2012 05:00 PM    Triglyceride 298 (H) 12/09/2021 04:10 AM    CHOL/HDL Ratio 4.3 05/22/2012 05:00 PM     Lab Results   Component Value Date/Time    Glucose (POC) 74 12/23/2021 12:50 PM    Glucose (POC) 58 (L) 12/23/2021 12:02 PM    Glucose (POC) 46 (LL) 12/23/2021 12:00 PM    Glucose (POC) 99 12/22/2021 11:02 PM    Glucose (POC) 84 12/22/2021 08:52 PM     Lab Results   Component Value Date/Time    Color RED 11/16/2012 05:15 PM    Appearance OPAQUE 11/16/2012 05:15 PM    Specific gravity 1.020 11/16/2012 05:15 PM    Specific gravity 1.011 09/17/2012 01:35 AM    pH (UA) 8.0 11/16/2012 05:15 PM    Protein >300 (A) 11/16/2012 05:15 PM    Glucose 100 (A) 11/16/2012 05:15 PM    Ketone NEGATIVE  11/16/2012 05:15 PM    Bilirubin NEGATIVE  09/17/2012 01:35 AM    Urobilinogen 0.2 11/16/2012 05:15 PM    Nitrites NEGATIVE  11/16/2012 05:15 PM    Leukocyte Esterase NEGATIVE  11/16/2012 05:15 PM    Epithelial cells 5-10 11/16/2012 05:15 PM    Bacteria 3+ (A) 11/16/2012 05:15 PM    WBC >100 (H) 11/16/2012 05:15 PM    RBC >100 (H) 11/16/2012 05:15 PM         Medications Reviewed:     Current Facility-Administered Medications   Medication Dose Route Frequency    aluminum-magnesium hydroxide (MAALOX) oral suspension 15 mL  15 mL Oral QID PRN    nadoloL (CORGARD) tablet 20 mg  20 mg Oral DAILY    octreotide (SANDOSTATIN) 500 mcg in 0.9% sodium chloride 500 mL infusion  25 mcg/hr IntraVENous CONTINUOUS    ondansetron (ZOFRAN ODT) tablet 4 mg  4 mg Oral Q6H PRN    0.9% sodium chloride infusion 250 mL  250 mL IntraVENous PRN    metoclopramide HCl (REGLAN) injection 5 mg  5 mg IntraVENous Q12H    pantoprazole (PROTONIX) 40 mg in 0.9% sodium chloride 10 mL injection  40 mg IntraVENous Q12H    morphine injection 1 mg  1 mg IntraVENous Q4H PRN    morphine injection 0.5 mg  0.5 mg IntraVENous Q4H PRN    [Held by provider] metoclopramide HCl (REGLAN) tablet 5 mg  5 mg Oral BID    [Held by provider] pantoprazole (PROTONIX) tablet 40 mg  40 mg Oral ACB&D    calcium carbonate (TUMS) chewable tablet 400 mg [elemental]  400 mg Oral QID PRN    insulin lispro (HUMALOG) injection   SubCUTAneous AC&HS    oxyCODONE IR (ROXICODONE) tablet 5 mg  5 mg Oral Q4H PRN    oxyCODONE IR (ROXICODONE) tablet 10 mg  10 mg Oral Q4H PRN    diphenhydrAMINE (BENADRYL) capsule 25 mg  25 mg Oral QHS PRN    balsam peru-castor oiL (VENELEX) ointment   Topical Q12H    epoetin emilie-epbx (RETACRIT) injection 20,000 Units  20,000 Units SubCUTAneous Q TUE, THU & SAT    glucagon (GLUCAGEN) injection 1 mg  1 mg IntraMUSCular PRN    dextrose (D50W) injection syrg 12.5-25 g  12.5-25 g IntraVENous PRN    simethicone (MYLICON) 60AU/0.6SQ oral drops 80 mg  1.2 mL Oral Multiple    [Held by provider] calcium acetate(phosphat bind) (PHOSLO) capsule 1,334 mg  2 Capsule Per NG tube TID WITH MEALS    albuterol-ipratropium (DUO-NEB) 2.5 MG-0.5 MG/3 ML  3 mL Nebulization Q4H PRN    albumin human 25% (BUMINATE) solution 25 g  25 g IntraVENous DIALYSIS PRN    alcohol 62% (NOZIN) nasal  1 Ampule  1 Ampule Topical Q12H    chlorhexidine (PERIDEX) 0.12 % mouthwash 15 mL  15 mL Oral Q12H    acetaminophen (TYLENOL) tablet 650 mg  650 mg Oral Q6H PRN    [Held by provider] L.acidophilus-paracasei-S.thermophil-bifidobacter (RISAQUAD) 8 billion cell capsule  1 Capsule Oral DAILY    sodium chloride (NS) flush 5-40 mL  5-40 mL IntraVENous Q8H    sodium chloride (NS) flush 5-40 mL  5-40 mL IntraVENous PRN    polyethylene glycol (MIRALAX) packet 17 g  17 g Oral DAILY PRN     ______________________________________________________________________  EXPECTED LENGTH OF STAY: 4d 21h  ACTUAL LENGTH OF STAY:          [address], [place]

## 2021-12-23 NOTE — PROGRESS NOTES
GI PROGRESS NOTE  Chase Taveras NP  [phone] NP in-hospital cell phone M-F until 4:30  After 5pm or on weekends, please call  for physician on call    NAME:Claude Elson Purl :1975 ABR:[id number]   ATTG: Dr Sanjana Maciel PCP: Frances Zuluaga MD Date/Time:  2021 1044AM  Primary GI: Dr. Jihan Raymond; Dr. Janee Licea covering   Assessment:     Acute onset of Hematemesis - 2/2 ulcers in distal esophagus- severe   Gastric varices on EGD and CT scan  Acute Blood Loss anemia - hgb 8.9 today  Melena - no more yesterday and today  Chronic thrombocytopenia -  stabalizing  On Warfarin and ASA 81mg - last dose 21PM   ·  EGD 21 noted some concern for gastric varices - on CT review does show to have some increase vasculature increasing risk of bleeding noting gastric varices  · started octreotide gtt 21 - will need 72 hours- can d/c today     CT abd/pel 21 : 1. No CTA evidence of acute gastrointestinal hemorrhage. 2. Fluid-filled slightly distended distal thoracic esophagus and stomach which  also contains high density material. Moderate fluid distention of the colon. No  bowel obstruction. 3. 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 No splenomegaly. 4. Small pleural effusions and bilateral interstitial/airspace opacities in the  lung bases. See separate CT chest report    EGD 21 : Esophagus:  Full of fresh blood and large clots. Eventually cleared and two ulcers (10 mm) and (5 mm) seen in distal esophagus at 42 cm. Very friable, but no active bleeding. Injected Epi 1 : 10,000 total of 8.5 cc into area around both ulcers. Stomach: Full of huge clots, so only about 20 % of mucosa seen. Clots in stomach are dark and look old. Cannot find pylorus. Duodenum: not entered due to blood obscuring    EGD 2021:  Esophagus:  Upper and mid esophagus appears normal. Distal esophagus looks diffusely ulcerated with large amount of old clot.  No active bleeding seen. Stomach: FULL of old blood, large clots. Cannot evaluate gastric mucosa. Pylorus found and patent. Scope was able to be advanced into duodenum  Duodenum: normal bulb and second portion. No ulcers seen    EGD 21: - Multiple superficial esophageal and gastric ulcers with no sign of recent bleeding. - Large amount of blood clot and clear liquid in the stomach. Suctioned. - Submucosal venous abnormality possibly consistent with a gastric varix/ venous abnormality was seen in the fundus. Two hemoclips placed near these abnormalities to jeane there location for possible fluoroscopic intervention.  - Normal duodenum     Bacteremia - Gram + sepsis with dental abscess  COVID+ -  Admitted 21  ESRD on dialysis  Chronic pain  HTN     Plan:     · Can discontinue octreotide today; has received for 72 hours   · Continue nadolol 20mg daily; continue at discharge   · Advanced to GI lite diet   · Continue acid suppression  BID  · Added maalox PRN for antacid  · Follow CBC - transfuse with blood, plts, ffp as necessary  · Hold all anticoagulation  · Monitor for recurrent bleeding. · Symptomatic care per primary team   · Ok to discharge from a GI standpoint   · Nothing further to add from a GI standpoint. GI signing-off. Please call with any questions. Thank you for this consult. Plan of care discussed with Dr. Javy Gilman. This patient was seen and examined by me in a face-to-face visit today. I reviewed the medical record including lab work, imaging and other provider notes. I confirmed the interval history as described above. I spoke to the patient, discussing our findings and plans. I discussed this case in detail with LISA Medina. I formulated an updated  assessment of this patient and guided our treatment plan. I agree with the above progress note. I agree with the history, exam and assessment and plan as outlined in the note.   I would like to add the followinyo M with episodic recurrence of bleeding from esophageal ulcers and apparent gastric varices as determined by EGD and CT review. Beningnabd exam. Stable Hgb. Melena resolved. Plan:  1) Monitor for recurrent bleeding, 2) Allow GI lite diet and advance as tolerated, 3) Serial H/H and transfuse PRN, 4) Will d/c Octreotide gtt today , 5) Continue nadolol 20mg PO every day, 6) Maalox PRN, 7) No anticoagulation, 8) d/c metoclopramide. Will sign off. Ji Paulino MD  Subjective:   Discussed with RN events overnight. Seen this AM in dialysis. Patient denies any complaints. No longer with melena. Review of Systems:  Symptom Y/N Comments  Symptom Y/N Comments   Fever/Chills n   Chest Pain n    Cough n   Headaches n    Sputum n   Joint Pain n    SOB/ZAMUDIO n   Pruritis/Rash n    Tolerating Diet y   Other       Could NOT obtain due to:        Objective:   VITALS:   Last 24hrs VS reviewed since prior progress note. Most recent are:  Visit Vitals  /77   Pulse 72   Temp 98.3 °F (36.8 °C) (Oral)   Resp 16   Ht 5' 7\" (1.702 m)   Wt 61.3 kg (135 lb 1.6 oz)   SpO2 98%   BMI 21.16 kg/m²     No intake or output data in the 24 hours ending 12/23/21 0950  PHYSICAL EXAM:  General:  Pleasant AA male. NAD    HEENT:  NC, Atraumatic. Lungs:  CTA Bilaterally. No Wheezing/Rhonchi/Rales. Heart:  Regular  rhythm,  No murmur  Abdomen:  Soft, Non distended, Non tender. +Bowel sounds, no HSM  Extremities: BLE edema. Renal fistula. Neurologic:  Alert and oriented X 3. No acute neurological distress   Psych:    Good insight. Not anxious nor agitated.       Lab and Radiology Data Reviewed: (see below)    Medications Reviewed: (see below)  PMH/SH reviewed - no change compared to H&P  ________________________________________________________________________  Total time spent with patient: 20 minutes ________________________________________________________________________  Care Plan discussed with:  Patient x   Family     RN x              Consultant:       Stephani Flores LISA Medina     Procedures: see electronic medical records for all procedures/Xrays and details which were not copied into this note but were reviewed prior to creation of Plan. LABS:  Recent Labs     12/23/21 0327 12/22/21 0151   WBC 6.7 5.5   HGB 8.9* 8.1*   HCT 28.9* 26.2*   * 148*     Recent Labs     12/23/21 0327 12/22/21 0151 12/21/21 0237   * 136 139   K 4.5 4.1 5.0    101 104   CO2 24 28 26   BUN 39* 36* 78*   CREA 6.21* 4.90* 7.02*   GLU 81 145* 141*   CA 7.1* 7.2* 8.0*   MG 2.2 2.0 2.4   PHOS 6.2* 4.9* 5.6*     Recent Labs     12/22/21 0151 12/21/21 0237   * 128*   TP 6.0* 6.1*   ALB 2.1* 2.2*   GLOB 3.9 3.9     No results for input(s): INR, PTP, APTT, INREXT, INREXT in the last 72 hours. No results for input(s): FE, TIBC, PSAT, FERR in the last 72 hours. Lab Results   Component Value Date/Time    Folate 7.5 11/23/2011 11:26 AM     No results for input(s): PH, PCO2, PO2 in the last 72 hours. No results for input(s): CPK, CKMB in the last 72 hours.     No lab exists for component: TROPONINI  Lab Results   Component Value Date/Time    Color RED 11/16/2012 05:15 PM    Appearance OPAQUE 11/16/2012 05:15 PM    Specific gravity 1.020 11/16/2012 05:15 PM    Specific gravity 1.011 09/17/2012 01:35 AM    pH (UA) 8.0 11/16/2012 05:15 PM    Protein >300 (A) 11/16/2012 05:15 PM    Glucose 100 (A) 11/16/2012 05:15 PM    Ketone NEGATIVE  11/16/2012 05:15 PM    Bilirubin NEGATIVE  09/17/2012 01:35 AM    Urobilinogen 0.2 11/16/2012 05:15 PM    Nitrites NEGATIVE  11/16/2012 05:15 PM    Leukocyte Esterase NEGATIVE  11/16/2012 05:15 PM    Epithelial cells 5-10 11/16/2012 05:15 PM    Bacteria 3+ (A) 11/16/2012 05:15 PM    WBC >100 (H) 11/16/2012 05:15 PM    RBC >100 (H) 11/16/2012 05:15 PM       MEDICATIONS:  Current Facility-Administered Medications   Medication Dose Route Frequency    aluminum-magnesium hydroxide (MAALOX) oral suspension 15 mL  15 mL Oral QID PRN    nadoloL (CORGARD) tablet 20 mg  20 mg Oral DAILY    octreotide (SANDOSTATIN) 500 mcg in 0.9% sodium chloride 500 mL infusion  25 mcg/hr IntraVENous CONTINUOUS    ondansetron (ZOFRAN ODT) tablet 4 mg  4 mg Oral Q6H PRN    0.9% sodium chloride infusion 250 mL  250 mL IntraVENous PRN    metoclopramide HCl (REGLAN) injection 5 mg  5 mg IntraVENous Q12H    pantoprazole (PROTONIX) 40 mg in 0.9% sodium chloride 10 mL injection  40 mg IntraVENous Q12H    morphine injection 1 mg  1 mg IntraVENous Q4H PRN    morphine injection 0.5 mg  0.5 mg IntraVENous Q4H PRN    [Held by provider] metoclopramide HCl (REGLAN) tablet 5 mg  5 mg Oral BID    [Held by provider] pantoprazole (PROTONIX) tablet 40 mg  40 mg Oral ACB&D    calcium carbonate (TUMS) chewable tablet 400 mg [elemental]  400 mg Oral QID PRN    insulin lispro (HUMALOG) injection   SubCUTAneous AC&HS    oxyCODONE IR (ROXICODONE) tablet 5 mg  5 mg Oral Q4H PRN    oxyCODONE IR (ROXICODONE) tablet 10 mg  10 mg Oral Q4H PRN    diphenhydrAMINE (BENADRYL) capsule 25 mg  25 mg Oral QHS PRN    balsam peru-castor oiL (VENELEX) ointment   Topical Q12H    epoetin emilie-epbx (RETACRIT) injection 20,000 Units  20,000 Units SubCUTAneous Q TUE, THU & SAT    glucagon (GLUCAGEN) injection 1 mg  1 mg IntraMUSCular PRN    dextrose (D50W) injection syrg 12.5-25 g  12.5-25 g IntraVENous PRN    simethicone (MYLICON) 91KT/5.6FB oral drops 80 mg  1.2 mL Oral Multiple    [Held by provider] calcium acetate(phosphat bind) (PHOSLO) capsule 1,334 mg  2 Capsule Per NG tube TID WITH MEALS    albuterol-ipratropium (DUO-NEB) 2.5 MG-0.5 MG/3 ML  3 mL Nebulization Q4H PRN    albumin human 25% (BUMINATE) solution 25 g  25 g IntraVENous DIALYSIS PRN    alcohol 62% (NOZIN) nasal  1 Ampule  1 Ampule Topical Q12H    chlorhexidine (PERIDEX) 0.12 % mouthwash 15 mL  15 mL Oral Q12H    acetaminophen (TYLENOL) tablet 650 mg  650 mg Oral Q6H PRN    [Held by provider] L.acidophilus-paracasei-S.thermophil-bifidobacter (RISAQUAD) 8 billion cell capsule  1 Capsule Oral DAILY    sodium chloride (NS) flush 5-40 mL  5-40 mL IntraVENous Q8H    sodium chloride (NS) flush 5-40 mL  5-40 mL IntraVENous PRN    polyethylene glycol (MIRALAX) packet 17 g  17 g Oral DAILY PRN

## 2021-12-23 NOTE — WOUND CARE
Wound Care Nurse consult: consult placed for \"blister\" by staff nurse. Patient is a 43 y/o AAM admitted for acute Covid 19. Past Medical History:   Diagnosis Date    Abdominal hematoma     AICD (automatic cardioverter/defibrillator) present     CAD (coronary artery disease)     anterior MI s/p 2 stents  2007    Chronic abdominal pain     Chronic kidney disease     ESRD; Dialysis dependent. [facility]- [facility] tpke    DVT (deep venous thrombosis) ([facility] [address] 2001    DVT of popliteal vein ([address] 11/2011    not anticoagulated due to bleeding, IVC filter    Endocarditis     Gallstone pancreatitis     Gastrointestinal disorder     acid reflux    Gastrointestinal disorder     peptic ulcer    Hemodialysis patient ([facility] [address]     High cholesterol     Hypertension     Kidney transplant     b/l kidneys 1995, 2001    Long term current use of anticoagulant therapy     Nephrotic syndrome     Other ill-defined conditions(799.89)     kidny transplant x2,  on dialysis    Other ill-defined conditions(799.89)     home dialysis    Other ill-defined conditions(799.89)     hx recurrent left leg DVT    Peritonitis ([address]     Seizures ([address] 2015    most recent- only had three in lifetime    Small bowel obstruction (HCC)     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       Wound Toe (Comment  which one) Anterior;Right beefy red and wet, appears to be open blister 12/22/21 (Active)   Wound Etiology Venous 12/23/21 1535   Dressing Status New dressing applied 12/23/21 1535   Cleansed Betadine/Povidone Iodine 12/23/21 1535   Dressing/Treatment Dry dressing 12/23/21 1535   Wound Assessment Pink/red 12/23/21 1535   Drainage Amount Scant 12/23/21 1535   Drainage Description Serosanguinous 12/23/21 1535   Wound Odor None 12/23/21 1535   Rosa-Wound/Incision Assessment Intact 12/23/21 1535 Edges Undefined edges 12/23/21 1535   Wound Thickness Description Partial thickness 12/23/21 1535   Number of days: 1          Recommend:    Left 2nd and 3rd toes: wash with soap and water, dry completely. Either paint with betadine/povidine swabs or apply some antibacterial ointment. Weave a 4x4 between toes.     Pollo Wright RN, [employer]

## 2021-12-23 NOTE — PROGRESS NOTES
Problem: Mobility Impaired (Adult and Pediatric)  Goal: *Acute Goals and Plan of Care (Insert Text)  Description: FUNCTIONAL STATUS PRIOR TO ADMISSION: Patient was independent and active without use of DME.    HOME SUPPORT PRIOR TO ADMISSION: The patient lived with sister. Physical Therapy Goals  Initiated 11/30/2021  1. Patient will move from supine to sit and sit to supine  in bed with independence within 7 day(s). 2.  Patient will transfer from bed to chair and chair to bed with modified independence using the least restrictive device within 7 day(s). 3.  Patient will perform sit to stand with modified independence within 7 day(s). 4.  Patient will ambulate with modified independence for 100 feet with the least restrictive device within 7 day(s). Reviewed 12/15- goals remain appropriate. Reviewed 12/22 - goals remain appropriate. Pt currently Mod I-S for mobility, unsteady gait, extra time/rest breaks needed. Outcome: Progressing Towards Goal     PHYSICAL THERAPY TREATMENT  Patient: Naima Recio (71 y.o. male)  Date: 12/23/2021  Diagnosis: Acute COVID-19 [U07.1] <principal problem not specified>  Procedure(s) (LRB):  ESOPHAGOGASTRODUODENOSCOPY (EGD) WITH RESOLUTION CLIP  (N/A)  RESOLUTION CLIP (N/A) 4 Days Post-Op  Precautions: Contact,Fall,Skin (COVID)  Chart, physical therapy assessment, plan of care and goals were reviewed. ASSESSMENT  Patient continues with skilled PT services and is progressing towards goals. Pt received seated in chair, agreeable to PT session. Pt reports some fatigue after dialysis, but able to walk up to 700ft with rollator and Florian-S. 1 sitting break half-way for 1-2 minutes 2/2 fatigue. Educated on use of rollator brakes, sit <> stand from rollator with SBA. Contacted case management for recommended rollator for upcoming DC home with [facility] services. Will continue to follow. Current Level of Function Impacting Discharge (mobility/balance):  Mod I with rollator short distances, S in periods of fatigue    Other factors to consider for discharge: lives with sister. Needs rollator for discharge          PLAN :  Patient continues to benefit from skilled intervention to address the above impairments. Continue treatment per established plan of care. to address goals. Recommendation for discharge: (in order for the patient to meet his/her long term goals)  Physical therapy at least 2 days/week in the home AND ensure assist and/or supervision for safety with upright mobility in periods of fatigue    This discharge recommendation:  Has been made in collaboration with the attending provider and/or case management    IF patient discharges home will need the following DME: rollator       SUBJECTIVE:   Patient stated we can go for a walk. I do need to move.     OBJECTIVE DATA SUMMARY:   Critical Behavior:  Neurologic State: Alert  Orientation Level: Oriented X4  Cognition: Appropriate decision making  Safety/Judgement: Awareness of environment  Functional Mobility Training:  Bed Mobility:                    Transfers:                                   Balance:     Ambulation/Gait Training:  Distance (ft): 700 Feet (ft)  Assistive Device: Gait belt;Walker, rollator  Ambulation - Level of Assistance: Modified independent;Supervision        Gait Abnormalities: Decreased step clearance;Shuffling gait        Base of Support: Narrowed     Speed/Bernarda: Slow  Step Length: Left shortened;Right shortened    Activity Tolerance:   Good and requires rest breaks    After treatment patient left in no apparent distress:   Sitting in chair and Call bell within reach    COMMUNICATION/COLLABORATION:   The patients plan of care was discussed with: Registered nurse and .      Joanne Manley, PT, DPT, NCS   Time Calculation: 14 mins

## 2021-12-23 NOTE — PROGRESS NOTES
Problem: Self Care Deficits Care Plan (Adult)  Goal: *Acute Goals and Plan of Care (Insert Text)  Description:   FUNCTIONAL STATUS PRIOR TO ADMISSION: Patient was independent and active without use of DME. Pt reports doing his own home HD.    HOME SUPPORT: The patient lived with sister but did not require assist.    Occupational Therapy Goals  Initiated 11/28/2021; Re-evaluated 12/15/2021 Goals updated below. 12/23/2021 Goals updated. 1.  Patient will perform grooming standing sinkside with S/mod I within 7 day(s). 2.  Patient will perform bathing in supported sitting with mod I seated sinkside within 7 day(s). 3.  Patient will perform lower body dressing with modified independence within 7 day(s). 4.  Patient will perform toilet transfers with minimal assistance/contact guard assist within 7 day(s). 5.  Patient will perform all aspects of toileting with modified independence within 7 day(s). 6.  Patient will participate in upper extremity therapeutic exercise/activities with independence for 15 minutes within 7 day(s). 7.  Patient will utilize energy conservation techniques during functional activities with verbal cues within 7 day(s). 1.  Patient will perform grooming standing sinkside with CGA/minimal assist within 7 day(s). 2.  Patient will perform bathing in supported sitting with supervision/set-up within 7 day(s). UPGRADE: independence  3. Patient will perform lower body dressing with minimal assistance/contact guard assist within 7 day(s). UPGRADE: independence  4. Patient will perform toilet transfers with minimal assistance/contact guard assist within 7 day(s). UPGRADE: independence  5. Patient will perform all aspects of toileting with supervision/set-up within 7 day(s). UPGRADE: independence  6. Patient will participate in upper extremity therapeutic exercise/activities with supervision/set-up for 15 minutes within 7 day(s).     7.  Patient will utilize energy conservation techniques during functional activities with verbal cues within 7 day(s). Outcome: Progressing Towards Goal     OCCUPATIONAL THERAPY TREATMENT/WEEKLY RE-EVALUATION  Patient: Omari Grewal (31 y.o. male)  Date: 12/23/2021  Diagnosis: Acute COVID-19 [U07.1] <principal problem not specified>  Procedure(s) (LRB):  ESOPHAGOGASTRODUODENOSCOPY (EGD) WITH RESOLUTION CLIP  (N/A)  RESOLUTION CLIP (N/A) 4 Days Post-Op  Precautions: Contact,Fall,Skin  Chart, occupational therapy assessment, plan of care, and goals were reviewed. ASSESSMENT  Based on the objective data described below, patient is making excellent progress towards goals. Pt known to this OT from initial evaluation and continues to be limited by functional endurance, but improved ability to independently apply Energy Conservation/Work Simplification Techniques during ADLs and mobility. Pt w/ significant BLE edema and able to don/doff socks in crossed leg for RLE and ankle-over-knee for LLE; also instructed on long sitting for days when he is weak. Pt concerned about LE dressing of pants and instructed on oversized clothing w/ drawstring to accommodate for swelling. Instructed on use of Rollator for seated ADLs on weaker days. Current Level of Function Impacting Discharge (ADLs): HD    Other factors to consider for discharge: supportive sister         PLAN :  Goals have been updated based on progression since last assessment. Patient continues to benefit from skilled intervention to address the above impairments. Continue to follow patient 3 times a week to address goals.     Recommend with staff: up during day in [address] [place] [address] next OT session: standing ADLs sinkside    Recommendation for discharge: (in order for the patient to meet his/her long term goals)  Occupational therapy at least 2 days/week in the home     This discharge recommendation:  A follow-up discussion with the attending provider and/or case management is planned    Equipment recommendations for successful discharge (if) home: walker: rollator and HHOT to determine further DME or A/E needs       SUBJECTIVE:   Patient stated I am wondering if my pants will fit over my legs.     OBJECTIVE DATA SUMMARY:   Cognitive/Behavioral Status:  Neurologic State: Alert; Appropriate for age  Orientation Level: Appropriate for age;Oriented X4  Cognition: Follows commands  Perception: Appears intact  Perseveration: No perseveration noted  Safety/Judgement: Awareness of environment; Fall prevention; Insight into deficits    Functional Mobility and Transfers for ADLs:    Balance:  Sitting: Intact  Standing: Intact    ADL Intervention:  Feeding  Drink to Mouth: Independent    Lower Body Dressing Assistance  Socks: Set-up; Supervision  Position Performed: Long sitting on bed;Seated in chair;Bending forward method    Cognitive Retraining  Safety/Judgement: Awareness of environment; Fall prevention; Insight into deficits    Pain:  0/10    Activity Tolerance:   requires frequent rest breaks    After treatment patient left in no apparent distress:   Sitting in chair and Call bell within reach    COMMUNICATION/COLLABORATION:   The patients plan of care was discussed with: Physical Therapist and Registered Nurse    Ced Etienne  Time Calculation: 15 mins

## 2021-12-23 NOTE — PROGRESS NOTES
Alert and oriented x4, assessments and VS completed, medications administered per [facility], pt has reports of pain, prn medication provided and effective, pt and walking in hallways with PT, pt off unit for dialysis, octreotide drip stopped. Pt blood sugar noted to be low when returning from dialysis, pt asymptomatic, encouraged pt to eat , rechecked BG which was 74.

## 2021-12-23 NOTE — PROGRESS NOTES
Transition of Care Plan:    RUR: 27% \"high risk\"  Disposition: Home with [facility] & follow-up appts  Follow up appointments: PCP, GI, Nephrology  DME needed: Rollator  Transportation at Discharge: Sister Suise Maurice or means to access home:  Yes      IM Medicare Letter: Needed at d/c  Is patient a BCPI-A Bundle: No   If yes, was Bundle Letter given?:    Is patient a [employer] and connected with the [address]? No  If yes, was [employer] transfer form completed and VA notified? Caregiver Contact: Christina Britt, [phone]  Discharge Caregiver contacted prior to discharge? CM reviewed pt's chart. Discharge plan discussed during IDR. Pt to discharge home with [facility] & follow-up appts. CM contacted [facility] ([phone]) to confirm chair time & schedule bu admissions coordinator was out for the day & informed to call back tomorrow AM. CM reported updates to attending MD, requesting for pt to received HD prior to discharge if needed. Attending MD reports he spoke with pt & he will resume home HD & will follow-up with his nephrologist if he wants to explore OP HD. CM will report updates to HD facility in the AM. CM notified [facility] admissions of pt's anticipated discharge; [facility] 12/27. CM also noted pt will need a rollator at discharge; CM sent referral to [facility]. Will continue to follow & report updates.     Valentino Boone, MSW  Care Management

## 2021-12-23 NOTE — PROGRESS NOTES
Nephrology Progress Note  Blaise Leblanc     [email or web address]  Phone - [phone]   Patient: Latoya Vasquez    YOB: 1975        Date- 12/23/2021   Admit Date: 11/27/2021  CC: Follow up for  esrd      IMPRESSION & PLAN:   · ESRD-- home HD 5 days per week- Follows up with [facility] Mariya Sh[facility] ( [facility]  · Covid 19 infection  · Hemorrhagic shock  · Acute blood loss anemia from esophageal bleed from esophagitis  · hyperkalemia  · Left arm swelling- s/p angioplasty of left subclavian vein  · Gram positive sepsis from dental abcess  · s/p lead extraction at [facility]  · Anemia of ckd  · Hx of renal tx in past times 2.  · Hypertension  · CAD  · Failed RTX times 2         H/o DVT, s/p ivc filter/ h/o HIT      PLAN-  · HD TODAY  · RESTART PHOSLO  · epogen for anemia  · He can go to [facility] for incenter hd. Subjective: Interval History:   bp stable  No new events   Waiting for placement        Objective:   Vitals:    12/23/21 1030 12/23/21 1048 12/23/21 1115 12/23/21 1206   BP: 111/65 105/63 115/70 124/74   Pulse: 63 81 63 79   Resp: 18 18 18 19   Temp:   98.4 °F (36.9 °C)    TempSrc:   Oral    SpO2:    98%   Weight:       Height:          No intake/output data recorded. Last 3 Recorded Weights in this Encounter    12/17/21 0437 12/18/21 0232 12/21/21 0444   Weight: 61.5 kg (135 lb 9.6 oz) 63.4 kg (139 lb 12.8 oz) 61.3 kg (135 lb 1.6 oz)      Physical exam:   GEN:nad  NECK- Supple  RESP: non distress  NEURO:non focal    Seen from outside of the room    Chart reviewed. Pertinent Notes reviewed.      Data Review :  Recent Labs     12/23/21  0327 12/22/21  0151 12/21/21  0237   * 136 139   K 4.5 4.1 5.0    101 104   CO2 24 28 26   BUN 39* 36* 78*   CREA 6.21* 4.90* 7.02*   GLU 81 145* 141*   CA 7.1* 7.2* 8.0*   MG 2.2 2.0 2.4   PHOS 6.2* 4.9* 5.6*     Recent Labs     12/23/21  0327 12/22/21  0151 12/21/21  0237   WBC 6.7 5.5 6.0   HGB 8.9* 8.1* 8.4*   HCT 28.9* 26.2* 26.4*   * 148* 157     No results for input(s): FE, TIBC, PSAT, FERR in the last 72 hours.    Medication list  reviewed  Current Facility-Administered Medications   Medication Dose Route Frequency    aluminum-magnesium hydroxide (MAALOX) oral suspension 15 mL  15 mL Oral QID PRN    nadoloL (CORGARD) tablet 20 mg  20 mg Oral DAILY    octreotide (SANDOSTATIN) 500 mcg in 0.9% sodium chloride 500 mL infusion  25 mcg/hr IntraVENous CONTINUOUS    ondansetron (ZOFRAN ODT) tablet 4 mg  4 mg Oral Q6H PRN    0.9% sodium chloride infusion 250 mL  250 mL IntraVENous PRN    metoclopramide HCl (REGLAN) injection 5 mg  5 mg IntraVENous Q12H    pantoprazole (PROTONIX) 40 mg in 0.9% sodium chloride 10 mL injection  40 mg IntraVENous Q12H    morphine injection 1 mg  1 mg IntraVENous Q4H PRN    morphine injection 0.5 mg  0.5 mg IntraVENous Q4H PRN    [Held by provider] metoclopramide HCl (REGLAN) tablet 5 mg  5 mg Oral BID    [Held by provider] pantoprazole (PROTONIX) tablet 40 mg  40 mg Oral ACB&D    calcium carbonate (TUMS) chewable tablet 400 mg [elemental]  400 mg Oral QID PRN    insulin lispro (HUMALOG) injection   SubCUTAneous AC&HS    oxyCODONE IR (ROXICODONE) tablet 5 mg  5 mg Oral Q4H PRN    oxyCODONE IR (ROXICODONE) tablet 10 mg  10 mg Oral Q4H PRN    diphenhydrAMINE (BENADRYL) capsule 25 mg  25 mg Oral QHS PRN    balsam peru-castor oiL (VENELEX) ointment   Topical Q12H    epoetin emilie-epbx (RETACRIT) injection 20,000 Units  20,000 Units SubCUTAneous Q TUE, THU & SAT    glucagon (GLUCAGEN) injection 1 mg  1 mg IntraMUSCular PRN    dextrose (D50W) injection syrg 12.5-25 g  12.5-25 g IntraVENous PRN    simethicone (MYLICON) 09PG/1.1KH oral drops 80 mg  1.2 mL Oral Multiple    [Held by provider] calcium acetate(phosphat bind) (PHOSLO) capsule 1,334 mg  2 Capsule Per NG tube TID WITH MEALS    albuterol-ipratropium (DUO-NEB) 2.5 MG-0.5 MG/3 ML  3 mL Nebulization Q4H PRN    albumin human 25% (BUMINATE) solution 25 g  25 g IntraVENous DIALYSIS PRN    alcohol 62% (NOZIN) nasal  1 Ampule  1 Ampule Topical Q12H    chlorhexidine (PERIDEX) 0.12 % mouthwash 15 mL  15 mL Oral Q12H    acetaminophen (TYLENOL) tablet 650 mg  650 mg Oral Q6H PRN    [Held by provider] L.acidophilus-paracasei-S.thermophil-bifidobacter (RISAQUAD) 8 billion cell capsule  1 Capsule Oral DAILY    sodium chloride (NS) flush 5-40 mL  5-40 mL IntraVENous Q8H    sodium chloride (NS) flush 5-40 mL  5-40 mL IntraVENous PRN    polyethylene glycol (MIRALAX) packet 17 g  17 g Oral DAILY PRN          Jayant Fatima MD              [facility]  [facility] / [facility] [address]  [place], [address]  Phone - [phone]               Fax - [phone]

## 2021-12-24 VITALS
SYSTOLIC BLOOD PRESSURE: 110 MMHG | TEMPERATURE: 97.1 F | RESPIRATION RATE: 16 BRPM | BODY MASS INDEX: 21.2 KG/M2 | OXYGEN SATURATION: 98 % | HEIGHT: 67 IN | WEIGHT: 135.1 LBS | DIASTOLIC BLOOD PRESSURE: 66 MMHG | HEART RATE: 66 BPM

## 2021-12-24 LAB
ANION GAP SERPL CALC-SCNC: 7 MMOL/L (ref 5–15)
BASOPHILS # BLD: 0.1 K/UL (ref 0–0.1)
BASOPHILS NFR BLD: 2 % (ref 0–1)
BUN SERPL-MCNC: 17 MG/DL (ref 6–20)
BUN/CREAT SERPL: 4 (ref 12–20)
CALCIUM SERPL-MCNC: 7.6 MG/DL (ref 8.5–10.1)
CHLORIDE SERPL-SCNC: 101 MMOL/L (ref 97–108)
CO2 SERPL-SCNC: 29 MMOL/L (ref 21–32)
COMMENT, HOLDF: NORMAL
CREAT SERPL-MCNC: 4.65 MG/DL (ref 0.7–1.3)
DIFFERENTIAL METHOD BLD: ABNORMAL
EOSINOPHIL # BLD: 0.9 K/UL (ref 0–0.4)
EOSINOPHIL NFR BLD: 14 % (ref 0–7)
ERYTHROCYTE [DISTWIDTH] IN BLOOD BY AUTOMATED COUNT: 19.2 % (ref 11.5–14.5)
GLUCOSE BLD STRIP.AUTO-MCNC: 105 MG/DL (ref 65–117)
GLUCOSE BLD STRIP.AUTO-MCNC: 117 MG/DL (ref 65–117)
GLUCOSE SERPL-MCNC: 113 MG/DL (ref 65–100)
HCT VFR BLD AUTO: 29.5 % (ref 36.6–50.3)
HGB BLD-MCNC: 9.4 G/DL (ref 12.1–17)
IMM GRANULOCYTES # BLD AUTO: 0 K/UL (ref 0–0.04)
IMM GRANULOCYTES NFR BLD AUTO: 0 % (ref 0–0.5)
LYMPHOCYTES # BLD: 1.4 K/UL (ref 0.8–3.5)
LYMPHOCYTES NFR BLD: 20 % (ref 12–49)
MAGNESIUM SERPL-MCNC: 2.3 MG/DL (ref 1.6–2.4)
MCH RBC QN AUTO: 30.1 PG (ref 26–34)
MCHC RBC AUTO-ENTMCNC: 31.9 G/DL (ref 30–36.5)
MCV RBC AUTO: 94.6 FL (ref 80–99)
MONOCYTES # BLD: 0.8 K/UL (ref 0–1)
MONOCYTES NFR BLD: 12 % (ref 5–13)
NEUTS SEG # BLD: 3.5 K/UL (ref 1.8–8)
NEUTS SEG NFR BLD: 52 % (ref 32–75)
NRBC # BLD: 0.02 K/UL (ref 0–0.01)
NRBC BLD-RTO: 0.3 PER 100 WBC
PHOSPHATE SERPL-MCNC: 4.5 MG/DL (ref 2.6–4.7)
PLATELET # BLD AUTO: 150 K/UL (ref 150–400)
PMV BLD AUTO: 11.4 FL (ref 8.9–12.9)
POTASSIUM SERPL-SCNC: 3.7 MMOL/L (ref 3.5–5.1)
RBC # BLD AUTO: 3.12 M/UL (ref 4.1–5.7)
SAMPLES BEING HELD,HOLD: NORMAL
SERVICE CMNT-IMP: NORMAL
SERVICE CMNT-IMP: NORMAL
SODIUM SERPL-SCNC: 137 MMOL/L (ref 136–145)
WBC # BLD AUTO: 6.7 K/UL (ref 4.1–11.1)

## 2021-12-24 PROCEDURE — 36415 COLL VENOUS BLD VENIPUNCTURE: CPT

## 2021-12-24 PROCEDURE — 74011000250 HC RX REV CODE- 250: Performed by: GENERAL ACUTE CARE HOSPITAL

## 2021-12-24 PROCEDURE — 74011250637 HC RX REV CODE- 250/637: Performed by: INTERNAL MEDICINE

## 2021-12-24 PROCEDURE — 74011250637 HC RX REV CODE- 250/637: Performed by: NURSE PRACTITIONER

## 2021-12-24 PROCEDURE — 84100 ASSAY OF PHOSPHORUS: CPT

## 2021-12-24 PROCEDURE — 80048 BASIC METABOLIC PNL TOTAL CA: CPT

## 2021-12-24 PROCEDURE — 83735 ASSAY OF MAGNESIUM: CPT

## 2021-12-24 PROCEDURE — 82962 GLUCOSE BLOOD TEST: CPT

## 2021-12-24 PROCEDURE — 85025 COMPLETE CBC W/AUTO DIFF WBC: CPT

## 2021-12-24 PROCEDURE — 74011250636 HC RX REV CODE- 250/636: Performed by: GENERAL ACUTE CARE HOSPITAL

## 2021-12-24 PROCEDURE — 2709999900 HC NON-CHARGEABLE SUPPLY

## 2021-12-24 PROCEDURE — 74011000250 HC RX REV CODE- 250: Performed by: INTERNAL MEDICINE

## 2021-12-24 PROCEDURE — C9113 INJ PANTOPRAZOLE SODIUM, VIA: HCPCS | Performed by: GENERAL ACUTE CARE HOSPITAL

## 2021-12-24 RX ORDER — NADOLOL 20 MG/1
20 TABLET ORAL DAILY
Qty: 30 TABLET | Refills: 2 | Status: ON HOLD | OUTPATIENT
Start: 2021-12-24 | End: 2021-12-28

## 2021-12-24 RX ADMIN — CALCIUM CARBONATE (ANTACID) CHEW TAB 500 MG 400 MG: 500 CHEW TAB at 11:26

## 2021-12-24 RX ADMIN — Medication 10 ML: at 05:05

## 2021-12-24 RX ADMIN — OXYCODONE 10 MG: 5 TABLET ORAL at 00:13

## 2021-12-24 RX ADMIN — MORPHINE SULFATE 1 MG: 2 INJECTION, SOLUTION INTRAMUSCULAR; INTRAVENOUS at 13:10

## 2021-12-24 RX ADMIN — SODIUM CHLORIDE 40 MG: 9 INJECTION, SOLUTION INTRAMUSCULAR; INTRAVENOUS; SUBCUTANEOUS at 08:37

## 2021-12-24 RX ADMIN — MORPHINE SULFATE 1 MG: 2 INJECTION, SOLUTION INTRAMUSCULAR; INTRAVENOUS at 08:36

## 2021-12-24 RX ADMIN — CALCIUM ACETATE 1334 MG: 667 CAPSULE ORAL at 11:26

## 2021-12-24 RX ADMIN — CHLORHEXIDINE GLUCONATE 15 ML: 1.2 RINSE ORAL at 08:37

## 2021-12-24 RX ADMIN — CALCIUM ACETATE 1334 MG: 667 CAPSULE ORAL at 08:37

## 2021-12-24 RX ADMIN — MORPHINE SULFATE 0.5 MG: 2 INJECTION, SOLUTION INTRAMUSCULAR; INTRAVENOUS at 03:21

## 2021-12-24 NOTE — PROGRESS NOTES
Ready from CM standpoint. Transition of Care Plan:     RUR: 25% \"high risk\"  Disposition: [facility] with [facility] (PT/OT/SN) & follow-up appts  >OP HD- [facility]: T/Th/Sat with 6:00 AM chair time  Follow up appointments: PCP, GI, Nephrology- details in AVS  DME needed: [address] at Discharge: Kirill Jennie or means to access home:  Yes      IM Medicare Letter: Reviewed & signed  Is patient a BCPI-A Bundle: No              If yes, was Bundle Letter given?:    Is patient a [place] and connected with the [place]? No  If yes, was [employer] transfer form completed and VA notified? Caregiver Contact: Jevon Hill, [phone]  Discharge Caregiver contacted prior to discharge? CM acknowledged discharge. Update 10:27 AM  CM received call from [address] [facility]  ([phone]) reporting OP HD chair has been set up for pt to start Sunday 12/26 with 6 AM chair time. Due to the holidays pt will be on S/T/Th schedule. CM to fax clinicals to dialysis center (fax# [phone]). CM met with pt to report updates. Pt verbalized understanding & is agreeable. Initial note-  CM reviewed pt's chart. CM followed up on DME referral sent to [facility]; provider accepted & will deliver rollator to pt's home on Monday (12/27). Met with pt at bedside to review MARINO plan for d/c re: home with [facility] & follow up appts. Pt reports he already has an upcoming appt with his PCP; aware to contact GI office on Monday. Reports his sister will transport him at discharge. Reports no questions or concerns for discharge home; requesting to receive COVID vaccnie prior to d/c home. RN aware. CM reviewed 2nd IMM letter with pt. Medicare pt has received, reviewed, and signed 2nd IM letter informing them of their right to appeal the discharge. Signed copy has been placed on pt bedside chart. No further needs.     Care Management Interventions  PCP Verified by CM: Yes  Mode of Transport at Discharge:  Other (see comment) (Sister)  Transition of Care Consult (CM Consult): Home Health,DME/Supply [address]: No  Discharge Durable Medical Equipment: Yes (nettieator- Jose Aniceto)  Physical Therapy Consult: Yes  Occupational Therapy Consult: Yes  Support Systems: Other Family Member(s)  Confirm Follow Up Transport: Family  The Plan for Transition of Care is Related to the Following Treatment Goals : Home with [facility] & follow-up appts  The Patient and/or Patient Representative was Provided with a Choice of Provider and Agrees with the Discharge Plan?: Yes  Freedom of Choice List was Provided with Basic Dialogue that Supports the Patient's Individualized Plan of Care/Goals, Treatment Preferences and Shares the Quality Data Associated with the Providers?: Yes  Discharge Location  Discharge Placement: Home with home health    DARREL Geronimo  Care Management

## 2021-12-24 NOTE — PROGRESS NOTES
Nephrology Progress Note  Blaise Leblanc     www. [email or web address]  Phone - [phone]   Patient: Timothy Hernandez    YOB: 1975        Date- 12/24/2021   Admit Date: 11/27/2021  CC: Follow up for  ESRD      IMPRESSION & PLAN:   · ESRD-- home HD 5 days per week- Follows up with Dr Gloria Olivares at [facility] ( [facility]  · Covid 19 infection  · Hemorrhagic shock  · Acute blood loss anemia from esophageal bleed from esophagitis  · hyperkalemia  · Left arm swelling- s/p angioplasty of left subclavian vein  · Gram positive sepsis from dental abcess  · s/p lead extraction at [facility]  · Anemia of ckd  · Hx of renal tx in past times 2.  · Hypertension  · CAD  · Failed RTX times 2         H/o DVT, s/p ivc filter/ h/o HIT      PLAN-  · NO DIALYSIS TODAY  · Continue PHOSLO  · epogen for anemia  · He can go to [facility] for incenter hd.  · Hd TTS schedule. -- if he is discharged today- he will get hd Sunday this week     Subjective: Interval History:   bp stable  Hb stable  S/p hd yesterday  Objective:   Vitals:    12/23/21 1507 12/23/21 1942 12/23/21 2300 12/24/21 0832   BP: 120/75 96/60 (!) 94/53 107/69   Pulse: 80 75 99 68   Resp: 20 20 18    Temp: 98.2 °F (36.8 °C) 99.2 °F (37.3 °C) 98.4 °F (36.9 °C) 97.8 °F (36.6 °C)   TempSrc:       SpO2: 97% 97% 99% 99%   Weight:       Height:          12/23 0701 - 12/24 0700  In: -   Out: 2500   Last 3 Recorded Weights in this Encounter    12/17/21 0437 12/18/21 0232 12/21/21 0444   Weight: 61.5 kg (135 lb 9.6 oz) 63.4 kg (139 lb 12.8 oz) 61.3 kg (135 lb 1.6 oz)      Physical exam:   GEN:nad  NECK- Supple  RESP: non distress  NEURO:non focal    Seen from outside of the room    Chart reviewed. Pertinent Notes reviewed.      Data Review :  Recent Labs     12/24/21  0400 12/23/21  0327 12/22/21  0151    135* 136   K 3.7 4.5 4.1    101 101   CO2 29 24 28   BUN 17 39* 36*   CREA 4.65* 6.21* 4.90*   GLU 113* 81 145*   CA 7.6* 7.1* 7.2*   MG 2.3 2.2 2.0   PHOS 4.5 6.2* 4.9*     Recent Labs     12/24/21  0400 12/23/21  0327 12/22/21  0151   WBC 6.7 6.7 5.5   HGB 9.4* 8.9* 8.1*   HCT 29.5* 28.9* 26.2*    148* 148*     No results for input(s): FE, TIBC, PSAT, FERR in the last 72 hours.    Medication list  reviewed  Current Facility-Administered Medications   Medication Dose Route Frequency    COVID-19 vac,Ad26-PF ([employer]) injection 1 Dose  1 Dose IntraMUSCular PRIOR TO DISCHARGE    aluminum-magnesium hydroxide (MAALOX) oral suspension 15 mL  15 mL Oral QID PRN    nadoloL (CORGARD) tablet 20 mg  20 mg Oral DAILY    ondansetron (ZOFRAN ODT) tablet 4 mg  4 mg Oral Q6H PRN    0.9% sodium chloride infusion 250 mL  250 mL IntraVENous PRN    pantoprazole (PROTONIX) 40 mg in 0.9% sodium chloride 10 mL injection  40 mg IntraVENous Q12H    morphine injection 1 mg  1 mg IntraVENous Q4H PRN    morphine injection 0.5 mg  0.5 mg IntraVENous Q4H PRN    [Held by provider] metoclopramide HCl (REGLAN) tablet 5 mg  5 mg Oral BID    [Held by provider] pantoprazole (PROTONIX) tablet 40 mg  40 mg Oral ACB&D    calcium carbonate (TUMS) chewable tablet 400 mg [elemental]  400 mg Oral QID PRN    insulin lispro (HUMALOG) injection   SubCUTAneous AC&HS    oxyCODONE IR (ROXICODONE) tablet 5 mg  5 mg Oral Q4H PRN    oxyCODONE IR (ROXICODONE) tablet 10 mg  10 mg Oral Q4H PRN    diphenhydrAMINE (BENADRYL) capsule 25 mg  25 mg Oral QHS PRN    balsam peru-castor oiL (VENELEX) ointment   Topical Q12H    epoetin emilie-epbx (RETACRIT) injection 20,000 Units  20,000 Units SubCUTAneous Q TUE, THU & SAT    glucagon (GLUCAGEN) injection 1 mg  1 mg IntraMUSCular PRN    dextrose (D50W) injection syrg 12.5-25 g  12.5-25 g IntraVENous PRN    simethicone (MYLICON) 86ZY/6.5XM oral drops 80 mg  1.2 mL Oral Multiple    calcium acetate(phosphat bind) (PHOSLO) capsule 1,334 mg  2 Capsule Per NG tube TID WITH MEALS    albuterol-ipratropium (DUO-NEB) 2.5 MG-0.5 MG/3 ML  3 mL Nebulization Q4H PRN    albumin human 25% (BUMINATE) solution 25 g  25 g IntraVENous DIALYSIS PRN    alcohol 62% (NOZIN) nasal  1 Ampule  1 Ampule Topical Q12H    chlorhexidine (PERIDEX) 0.12 % mouthwash 15 mL  15 mL Oral Q12H    acetaminophen (TYLENOL) tablet 650 mg  650 mg Oral Q6H PRN    [Held by provider] L.acidophilus-paracasei-S.thermophil-bifidobacter (RISAQUAD) 8 billion cell capsule  1 Capsule Oral DAILY    sodium chloride (NS) flush 5-40 mL  5-40 mL IntraVENous Q8H    sodium chloride (NS) flush 5-40 mL  5-40 mL IntraVENous PRN    polyethylene glycol (MIRALAX) packet 17 g  17 g Oral DAILY PRN          Blanca Anderson MD              [facility]  [facility] [place] [facility], [address], [address]  Phone - [phone]               Fax - [phone]

## 2021-12-26 ENCOUNTER — ANESTHESIA (OUTPATIENT)
Dept: SURGERY | Age: 46
DRG: 270 | End: 2021-12-26
Payer: MEDICARE

## 2021-12-26 ENCOUNTER — ANESTHESIA EVENT (OUTPATIENT)
Dept: SURGERY | Age: 46
DRG: 270 | End: 2021-12-26
Payer: MEDICARE

## 2021-12-26 ENCOUNTER — APPOINTMENT (OUTPATIENT)
Dept: GENERAL RADIOLOGY | Age: 46
DRG: 270 | End: 2021-12-26
Attending: NURSE PRACTITIONER
Payer: MEDICARE

## 2021-12-26 ENCOUNTER — HOSPITAL ENCOUNTER (INPATIENT)
Age: 46
LOS: 22 days | Discharge: NURSING, REHAB OR LONG-TERM CARE | DRG: 270 | End: 2022-01-17
Attending: EMERGENCY MEDICINE | Admitting: INTERNAL MEDICINE
Payer: MEDICARE

## 2021-12-26 ENCOUNTER — APPOINTMENT (OUTPATIENT)
Dept: CT IMAGING | Age: 46
DRG: 270 | End: 2021-12-26
Attending: EMERGENCY MEDICINE
Payer: MEDICARE

## 2021-12-26 DIAGNOSIS — E86.1 HYPOTENSION DUE TO HYPOVOLEMIA: ICD-10-CM

## 2021-12-26 DIAGNOSIS — D64.9 ANEMIA, UNSPECIFIED TYPE: ICD-10-CM

## 2021-12-26 DIAGNOSIS — I95.89 HYPOTENSION DUE TO HYPOVOLEMIA: ICD-10-CM

## 2021-12-26 DIAGNOSIS — I70.90 ARTERIAL OCCLUSION: ICD-10-CM

## 2021-12-26 DIAGNOSIS — Z99.2 ESRD (END STAGE RENAL DISEASE) ON DIALYSIS (HCC): ICD-10-CM

## 2021-12-26 DIAGNOSIS — K92.2 ACUTE UPPER GI BLEED: Primary | ICD-10-CM

## 2021-12-26 DIAGNOSIS — N18.6 ESRD (END STAGE RENAL DISEASE) ON DIALYSIS (HCC): ICD-10-CM

## 2021-12-26 LAB
ALBUMIN SERPL-MCNC: 1.9 G/DL (ref 3.5–5)
ALBUMIN/GLOB SERPL: 0.5 {RATIO} (ref 1.1–2.2)
ALP SERPL-CCNC: 177 U/L (ref 45–117)
ALT SERPL-CCNC: 36 U/L (ref 12–78)
ANION GAP SERPL CALC-SCNC: 7 MMOL/L (ref 5–15)
APTT PPP: 22.7 SEC (ref 22.1–31)
ARTERIAL PATENCY WRIST A: ABNORMAL
AST SERPL-CCNC: 54 U/L (ref 15–37)
BASE EXCESS BLDV CALC-SCNC: 0.9 MMOL/L
BASOPHILS # BLD: 0.2 K/UL (ref 0–0.1)
BASOPHILS NFR BLD: 1 % (ref 0–1)
BDY SITE: ABNORMAL
BILIRUB SERPL-MCNC: 0.5 MG/DL (ref 0.2–1)
BUN SERPL-MCNC: 32 MG/DL (ref 6–20)
BUN/CREAT SERPL: 4 (ref 12–20)
CALCIUM SERPL-MCNC: 7.5 MG/DL (ref 8.5–10.1)
CHLORIDE SERPL-SCNC: 102 MMOL/L (ref 97–108)
CO2 SERPL-SCNC: 27 MMOL/L (ref 21–32)
COVID-19 RAPID TEST, COVR: DETECTED
CREAT SERPL-MCNC: 7.27 MG/DL (ref 0.7–1.3)
DIFFERENTIAL METHOD BLD: ABNORMAL
EOSINOPHIL # BLD: 0.8 K/UL (ref 0–0.4)
EOSINOPHIL NFR BLD: 6 % (ref 0–7)
ERYTHROCYTE [DISTWIDTH] IN BLOOD BY AUTOMATED COUNT: 19.1 % (ref 11.5–14.5)
GAS FLOW.O2 O2 DELIVERY SYS: ABNORMAL L/MIN
GLOBULIN SER CALC-MCNC: 4 G/DL (ref 2–4)
GLUCOSE SERPL-MCNC: 92 MG/DL (ref 65–100)
HCO3 BLDV-SCNC: 26.2 MMOL/L (ref 23–28)
HCT VFR BLD AUTO: 19 % (ref 36.6–50.3)
HCT VFR BLD AUTO: 22.1 % (ref 36.6–50.3)
HCT VFR BLD AUTO: 26.4 % (ref 36.6–50.3)
HGB BLD-MCNC: 6 G/DL (ref 12.1–17)
HGB BLD-MCNC: 7.2 G/DL (ref 12.1–17)
HGB BLD-MCNC: 8.2 G/DL (ref 12.1–17)
HISTORY CHECKED?,CKHIST: NORMAL
HISTORY CHECKED?,CKHIST: NORMAL
IMM GRANULOCYTES # BLD AUTO: 0.1 K/UL (ref 0–0.04)
IMM GRANULOCYTES NFR BLD AUTO: 1 % (ref 0–0.5)
INR PPP: 1.2 (ref 0.9–1.1)
LYMPHOCYTES # BLD: 3.4 K/UL (ref 0.8–3.5)
LYMPHOCYTES NFR BLD: 27 % (ref 12–49)
MCH RBC QN AUTO: 29.7 PG (ref 26–34)
MCHC RBC AUTO-ENTMCNC: 31.1 G/DL (ref 30–36.5)
MCV RBC AUTO: 95.7 FL (ref 80–99)
MONOCYTES # BLD: 1.4 K/UL (ref 0–1)
MONOCYTES NFR BLD: 11 % (ref 5–13)
NEUTS SEG # BLD: 7.1 K/UL (ref 1.8–8)
NEUTS SEG NFR BLD: 54 % (ref 32–75)
NRBC # BLD: 0.06 K/UL (ref 0–0.01)
NRBC BLD-RTO: 0.5 PER 100 WBC
O2/TOTAL GAS SETTING VFR VENT: 21 %
PCO2 BLDV: 45 MMHG (ref 41–51)
PH BLDV: 7.37 [PH] (ref 7.32–7.42)
PLATELET # BLD AUTO: 156 K/UL (ref 150–400)
PMV BLD AUTO: 11.9 FL (ref 8.9–12.9)
PO2 BLDV: 18 MMHG (ref 25–40)
POTASSIUM SERPL-SCNC: 4.7 MMOL/L (ref 3.5–5.1)
PROT SERPL-MCNC: 5.9 G/DL (ref 6.4–8.2)
PROTHROMBIN TIME: 12.8 SEC (ref 9–11.1)
RBC # BLD AUTO: 2.76 M/UL (ref 4.1–5.7)
SAO2 % BLDV: 24.7 % (ref 65–88)
SERVICE CMNT-IMP: ABNORMAL
SODIUM SERPL-SCNC: 136 MMOL/L (ref 136–145)
SOURCE, COVRS: ABNORMAL
SPECIMEN TYPE: ABNORMAL
THERAPEUTIC RANGE,PTTT: NORMAL SECS (ref 58–77)
WBC # BLD AUTO: 12.9 K/UL (ref 4.1–11.1)

## 2021-12-26 PROCEDURE — 74011636637 HC RX REV CODE- 636/637: Performed by: NURSE PRACTITIONER

## 2021-12-26 PROCEDURE — 74011000250 HC RX REV CODE- 250: Performed by: INTERNAL MEDICINE

## 2021-12-26 PROCEDURE — 77030010936 HC CLP LIG BSC -C: Performed by: INTERNAL MEDICINE

## 2021-12-26 PROCEDURE — 36430 TRANSFUSION BLD/BLD COMPNT: CPT

## 2021-12-26 PROCEDURE — 96374 THER/PROPH/DIAG INJ IV PUSH: CPT

## 2021-12-26 PROCEDURE — 86900 BLOOD TYPING SEROLOGIC ABO: CPT

## 2021-12-26 PROCEDURE — 74011000250 HC RX REV CODE- 250: Performed by: NURSE PRACTITIONER

## 2021-12-26 PROCEDURE — 76010000138 HC OR TIME 0.5 TO 1 HR: Performed by: INTERNAL MEDICINE

## 2021-12-26 PROCEDURE — 85730 THROMBOPLASTIN TIME PARTIAL: CPT

## 2021-12-26 PROCEDURE — C9113 INJ PANTOPRAZOLE SODIUM, VIA: HCPCS | Performed by: EMERGENCY MEDICINE

## 2021-12-26 PROCEDURE — 72125 CT NECK SPINE W/O DYE: CPT

## 2021-12-26 PROCEDURE — 96375 TX/PRO/DX INJ NEW DRUG ADDON: CPT

## 2021-12-26 PROCEDURE — 2709999900 HC NON-CHARGEABLE SUPPLY

## 2021-12-26 PROCEDURE — 76210000063 HC OR PH I REC FIRST 0.5 HR: Performed by: INTERNAL MEDICINE

## 2021-12-26 PROCEDURE — 85025 COMPLETE CBC W/AUTO DIFF WBC: CPT

## 2021-12-26 PROCEDURE — 74011250636 HC RX REV CODE- 250/636: Performed by: NURSE ANESTHETIST, CERTIFIED REGISTERED

## 2021-12-26 PROCEDURE — 74011250636 HC RX REV CODE- 250/636: Performed by: EMERGENCY MEDICINE

## 2021-12-26 PROCEDURE — 2709999900 HC NON-CHARGEABLE SUPPLY: Performed by: INTERNAL MEDICINE

## 2021-12-26 PROCEDURE — 0W3P8ZZ CONTROL BLEEDING IN GASTROINTESTINAL TRACT, VIA NATURAL OR ARTIFICIAL OPENING ENDOSCOPIC: ICD-10-PCS | Performed by: INTERNAL MEDICINE

## 2021-12-26 PROCEDURE — 02HV33Z INSERTION OF INFUSION DEVICE INTO SUPERIOR VENA CAVA, PERCUTANEOUS APPROACH: ICD-10-PCS | Performed by: NURSE PRACTITIONER

## 2021-12-26 PROCEDURE — 36415 COLL VENOUS BLD VENIPUNCTURE: CPT

## 2021-12-26 PROCEDURE — 77030021678 HC GLIDESCP STAT DISP VERT -B: Performed by: NURSE ANESTHETIST, CERTIFIED REGISTERED

## 2021-12-26 PROCEDURE — 76060000032 HC ANESTHESIA 0.5 TO 1 HR: Performed by: INTERNAL MEDICINE

## 2021-12-26 PROCEDURE — 74011250636 HC RX REV CODE- 250/636: Performed by: NURSE PRACTITIONER

## 2021-12-26 PROCEDURE — 3E0G8GC INTRODUCTION OF OTHER THERAPEUTIC SUBSTANCE INTO UPPER GI, VIA NATURAL OR ARTIFICIAL OPENING ENDOSCOPIC: ICD-10-PCS | Performed by: INTERNAL MEDICINE

## 2021-12-26 PROCEDURE — 71045 X-RAY EXAM CHEST 1 VIEW: CPT

## 2021-12-26 PROCEDURE — 85018 HEMOGLOBIN: CPT

## 2021-12-26 PROCEDURE — 74011000258 HC RX REV CODE- 258: Performed by: NURSE ANESTHETIST, CERTIFIED REGISTERED

## 2021-12-26 PROCEDURE — 74011250637 HC RX REV CODE- 250/637: Performed by: NURSE PRACTITIONER

## 2021-12-26 PROCEDURE — 70450 CT HEAD/BRAIN W/O DYE: CPT

## 2021-12-26 PROCEDURE — 74011250636 HC RX REV CODE- 250/636: Performed by: INTERNAL MEDICINE

## 2021-12-26 PROCEDURE — 80053 COMPREHEN METABOLIC PANEL: CPT

## 2021-12-26 PROCEDURE — 93005 ELECTROCARDIOGRAM TRACING: CPT

## 2021-12-26 PROCEDURE — 77030026438 HC STYL ET INTUB CARD -A: Performed by: NURSE ANESTHETIST, CERTIFIED REGISTERED

## 2021-12-26 PROCEDURE — 30233N1 TRANSFUSION OF NONAUTOLOGOUS RED BLOOD CELLS INTO PERIPHERAL VEIN, PERCUTANEOUS APPROACH: ICD-10-PCS | Performed by: HOSPITALIST

## 2021-12-26 PROCEDURE — 74011000258 HC RX REV CODE- 258: Performed by: INTERNAL MEDICINE

## 2021-12-26 PROCEDURE — C9113 INJ PANTOPRAZOLE SODIUM, VIA: HCPCS | Performed by: NURSE PRACTITIONER

## 2021-12-26 PROCEDURE — 74011250636 HC RX REV CODE- 250/636: Performed by: ANESTHESIOLOGY

## 2021-12-26 PROCEDURE — 85610 PROTHROMBIN TIME: CPT

## 2021-12-26 PROCEDURE — 82803 BLOOD GASES ANY COMBINATION: CPT

## 2021-12-26 PROCEDURE — 87635 SARS-COV-2 COVID-19 AMP PRB: CPT

## 2021-12-26 PROCEDURE — 74011000250 HC RX REV CODE- 250: Performed by: NURSE ANESTHETIST, CERTIFIED REGISTERED

## 2021-12-26 PROCEDURE — 86923 COMPATIBILITY TEST ELECTRIC: CPT

## 2021-12-26 PROCEDURE — P9016 RBC LEUKOCYTES REDUCED: HCPCS

## 2021-12-26 PROCEDURE — 99285 EMERGENCY DEPT VISIT HI MDM: CPT

## 2021-12-26 PROCEDURE — 77030008684 HC TU ET CUF COVD -B: Performed by: NURSE ANESTHETIST, CERTIFIED REGISTERED

## 2021-12-26 PROCEDURE — 65610000006 HC RM INTENSIVE CARE

## 2021-12-26 DEVICE — WORKING LENGTH 235CM, WORKING CHANNEL 2.8MM
Type: IMPLANTABLE DEVICE | Site: STOMACH | Status: FUNCTIONAL
Brand: RESOLUTION 360 CLIP

## 2021-12-26 RX ORDER — ACETAMINOPHEN 650 MG/1
650 SUPPOSITORY RECTAL
Status: DISCONTINUED | OUTPATIENT
Start: 2021-12-26 | End: 2022-01-10

## 2021-12-26 RX ORDER — DIPHENHYDRAMINE HYDROCHLORIDE 50 MG/ML
50 INJECTION, SOLUTION INTRAMUSCULAR; INTRAVENOUS ONCE
Status: CANCELLED | OUTPATIENT
Start: 2021-12-26 | End: 2021-12-26

## 2021-12-26 RX ORDER — SUCCINYLCHOLINE CHLORIDE 20 MG/ML
INJECTION INTRAMUSCULAR; INTRAVENOUS AS NEEDED
Status: DISCONTINUED | OUTPATIENT
Start: 2021-12-26 | End: 2021-12-26 | Stop reason: HOSPADM

## 2021-12-26 RX ORDER — SODIUM CHLORIDE, SODIUM LACTATE, POTASSIUM CHLORIDE, CALCIUM CHLORIDE 600; 310; 30; 20 MG/100ML; MG/100ML; MG/100ML; MG/100ML
25 INJECTION, SOLUTION INTRAVENOUS CONTINUOUS
Status: DISCONTINUED | OUTPATIENT
Start: 2021-12-26 | End: 2021-12-26 | Stop reason: HOSPADM

## 2021-12-26 RX ORDER — ONDANSETRON 2 MG/ML
INJECTION INTRAMUSCULAR; INTRAVENOUS AS NEEDED
Status: DISCONTINUED | OUTPATIENT
Start: 2021-12-26 | End: 2021-12-26 | Stop reason: HOSPADM

## 2021-12-26 RX ORDER — ONDANSETRON 2 MG/ML
4 INJECTION INTRAMUSCULAR; INTRAVENOUS
Status: DISCONTINUED | OUTPATIENT
Start: 2021-12-26 | End: 2022-01-18 | Stop reason: HOSPADM

## 2021-12-26 RX ORDER — SODIUM CHLORIDE 0.9 % (FLUSH) 0.9 %
5-40 SYRINGE (ML) INJECTION AS NEEDED
Status: CANCELLED | OUTPATIENT
Start: 2021-12-26

## 2021-12-26 RX ORDER — SODIUM CHLORIDE 9 MG/ML
250 INJECTION, SOLUTION INTRAVENOUS AS NEEDED
Status: DISPENSED | OUTPATIENT
Start: 2021-12-26 | End: 2021-12-27

## 2021-12-26 RX ORDER — FENTANYL CITRATE 50 UG/ML
25 INJECTION, SOLUTION INTRAMUSCULAR; INTRAVENOUS
Status: DISCONTINUED | OUTPATIENT
Start: 2021-12-26 | End: 2021-12-26

## 2021-12-26 RX ORDER — SODIUM CHLORIDE 0.9 % (FLUSH) 0.9 %
5-40 SYRINGE (ML) INJECTION EVERY 8 HOURS
Status: DISCONTINUED | OUTPATIENT
Start: 2021-12-26 | End: 2022-01-18 | Stop reason: HOSPADM

## 2021-12-26 RX ORDER — SODIUM CHLORIDE 0.9 % (FLUSH) 0.9 %
5-40 SYRINGE (ML) INJECTION AS NEEDED
Status: DISCONTINUED | OUTPATIENT
Start: 2021-12-26 | End: 2022-01-18 | Stop reason: HOSPADM

## 2021-12-26 RX ORDER — HYDROMORPHONE HYDROCHLORIDE 1 MG/ML
.2-.5 INJECTION, SOLUTION INTRAMUSCULAR; INTRAVENOUS; SUBCUTANEOUS
Status: DISCONTINUED | OUTPATIENT
Start: 2021-12-26 | End: 2021-12-26 | Stop reason: HOSPADM

## 2021-12-26 RX ORDER — PHENYLEPHRINE HCL IN 0.9% NACL 0.4MG/10ML
SYRINGE (ML) INTRAVENOUS
Status: DISCONTINUED | OUTPATIENT
Start: 2021-12-26 | End: 2021-12-26 | Stop reason: HOSPADM

## 2021-12-26 RX ORDER — PANTOPRAZOLE SODIUM 40 MG/10ML
40 INJECTION, POWDER, LYOPHILIZED, FOR SOLUTION INTRAVENOUS
Status: COMPLETED | OUTPATIENT
Start: 2021-12-26 | End: 2021-12-26

## 2021-12-26 RX ORDER — SODIUM CHLORIDE 0.9 % (FLUSH) 0.9 %
5-40 SYRINGE (ML) INJECTION AS NEEDED
Status: DISCONTINUED | OUTPATIENT
Start: 2021-12-26 | End: 2021-12-26 | Stop reason: HOSPADM

## 2021-12-26 RX ORDER — FENTANYL CITRATE 50 UG/ML
50 INJECTION, SOLUTION INTRAMUSCULAR; INTRAVENOUS
Status: COMPLETED | OUTPATIENT
Start: 2021-12-26 | End: 2021-12-26

## 2021-12-26 RX ORDER — DEXTROMETHORPHAN/PSEUDOEPHED 2.5-7.5/.8
1.2 DROPS ORAL
Status: CANCELLED | OUTPATIENT
Start: 2021-12-26

## 2021-12-26 RX ORDER — FENTANYL CITRATE 50 UG/ML
INJECTION, SOLUTION INTRAMUSCULAR; INTRAVENOUS AS NEEDED
Status: DISCONTINUED | OUTPATIENT
Start: 2021-12-26 | End: 2021-12-26 | Stop reason: HOSPADM

## 2021-12-26 RX ORDER — SODIUM CHLORIDE 9 MG/ML
125 INJECTION, SOLUTION INTRAVENOUS CONTINUOUS
Status: DISPENSED | OUTPATIENT
Start: 2021-12-26 | End: 2021-12-26

## 2021-12-26 RX ORDER — LIDOCAINE HYDROCHLORIDE 20 MG/ML
INJECTION, SOLUTION EPIDURAL; INFILTRATION; INTRACAUDAL; PERINEURAL AS NEEDED
Status: DISCONTINUED | OUTPATIENT
Start: 2021-12-26 | End: 2021-12-26 | Stop reason: HOSPADM

## 2021-12-26 RX ORDER — SODIUM CHLORIDE 0.9 % (FLUSH) 0.9 %
5-40 SYRINGE (ML) INJECTION EVERY 8 HOURS
Status: CANCELLED | OUTPATIENT
Start: 2021-12-26

## 2021-12-26 RX ORDER — SODIUM CHLORIDE 0.9 % (FLUSH) 0.9 %
5-40 SYRINGE (ML) INJECTION EVERY 8 HOURS
Status: DISCONTINUED | OUTPATIENT
Start: 2021-12-26 | End: 2021-12-26 | Stop reason: HOSPADM

## 2021-12-26 RX ORDER — FLUMAZENIL 0.1 MG/ML
0.2 INJECTION INTRAVENOUS
Status: CANCELLED | OUTPATIENT
Start: 2021-12-26 | End: 2021-12-26

## 2021-12-26 RX ORDER — SODIUM CHLORIDE 9 MG/ML
INJECTION, SOLUTION INTRAVENOUS
Status: DISCONTINUED | OUTPATIENT
Start: 2021-12-26 | End: 2021-12-26 | Stop reason: HOSPADM

## 2021-12-26 RX ORDER — DIPHENHYDRAMINE HYDROCHLORIDE 50 MG/ML
12.5 INJECTION, SOLUTION INTRAMUSCULAR; INTRAVENOUS AS NEEDED
Status: DISCONTINUED | OUTPATIENT
Start: 2021-12-26 | End: 2021-12-26 | Stop reason: HOSPADM

## 2021-12-26 RX ORDER — FENTANYL CITRATE 50 UG/ML
50 INJECTION, SOLUTION INTRAMUSCULAR; INTRAVENOUS
Status: DISCONTINUED | OUTPATIENT
Start: 2021-12-26 | End: 2021-12-27

## 2021-12-26 RX ORDER — MIDAZOLAM HYDROCHLORIDE 1 MG/ML
.25-5 INJECTION, SOLUTION INTRAMUSCULAR; INTRAVENOUS
Status: CANCELLED | OUTPATIENT
Start: 2021-12-26 | End: 2021-12-26

## 2021-12-26 RX ORDER — ACETAMINOPHEN 325 MG/1
650 TABLET ORAL
Status: DISCONTINUED | OUTPATIENT
Start: 2021-12-26 | End: 2022-01-10

## 2021-12-26 RX ORDER — ONDANSETRON 4 MG/1
8 TABLET, ORALLY DISINTEGRATING ORAL
Status: DISPENSED | OUTPATIENT
Start: 2021-12-26 | End: 2021-12-26

## 2021-12-26 RX ORDER — ONDANSETRON 2 MG/ML
4 INJECTION INTRAMUSCULAR; INTRAVENOUS
Status: COMPLETED | OUTPATIENT
Start: 2021-12-26 | End: 2021-12-26

## 2021-12-26 RX ORDER — NALOXONE HYDROCHLORIDE 0.4 MG/ML
0.4 INJECTION, SOLUTION INTRAMUSCULAR; INTRAVENOUS; SUBCUTANEOUS
Status: CANCELLED | OUTPATIENT
Start: 2021-12-26 | End: 2021-12-26

## 2021-12-26 RX ORDER — SODIUM CHLORIDE, SODIUM LACTATE, POTASSIUM CHLORIDE, CALCIUM CHLORIDE 600; 310; 30; 20 MG/100ML; MG/100ML; MG/100ML; MG/100ML
25 INJECTION, SOLUTION INTRAVENOUS CONTINUOUS
Status: CANCELLED | OUTPATIENT
Start: 2021-12-26 | End: 2021-12-27

## 2021-12-26 RX ORDER — ATROPINE SULFATE 0.1 MG/ML
0.5 INJECTION INTRAVENOUS
Status: CANCELLED | OUTPATIENT
Start: 2021-12-26 | End: 2021-12-27

## 2021-12-26 RX ORDER — DEXAMETHASONE SODIUM PHOSPHATE 4 MG/ML
INJECTION, SOLUTION INTRA-ARTICULAR; INTRALESIONAL; INTRAMUSCULAR; INTRAVENOUS; SOFT TISSUE AS NEEDED
Status: DISCONTINUED | OUTPATIENT
Start: 2021-12-26 | End: 2021-12-26 | Stop reason: HOSPADM

## 2021-12-26 RX ORDER — PROPOFOL 10 MG/ML
INJECTION, EMULSION INTRAVENOUS AS NEEDED
Status: DISCONTINUED | OUTPATIENT
Start: 2021-12-26 | End: 2021-12-26 | Stop reason: HOSPADM

## 2021-12-26 RX ORDER — LIDOCAINE HYDROCHLORIDE 10 MG/ML
0.1 INJECTION, SOLUTION EPIDURAL; INFILTRATION; INTRACAUDAL; PERINEURAL AS NEEDED
Status: CANCELLED | OUTPATIENT
Start: 2021-12-26

## 2021-12-26 RX ORDER — POLYETHYLENE GLYCOL 3350 17 G/17G
17 POWDER, FOR SOLUTION ORAL DAILY PRN
Status: DISCONTINUED | OUTPATIENT
Start: 2021-12-26 | End: 2022-01-18 | Stop reason: HOSPADM

## 2021-12-26 RX ORDER — FENTANYL CITRATE 50 UG/ML
25 INJECTION, SOLUTION INTRAMUSCULAR; INTRAVENOUS
Status: DISCONTINUED | OUTPATIENT
Start: 2021-12-26 | End: 2021-12-26 | Stop reason: HOSPADM

## 2021-12-26 RX ORDER — NOREPINEPHRINE BITARTRATE/D5W 8 MG/250ML
.5-16 PLASTIC BAG, INJECTION (ML) INTRAVENOUS
Status: DISCONTINUED | OUTPATIENT
Start: 2021-12-26 | End: 2021-12-31

## 2021-12-26 RX ORDER — MORPHINE SULFATE 2 MG/ML
2 INJECTION, SOLUTION INTRAMUSCULAR; INTRAVENOUS
Status: DISCONTINUED | OUTPATIENT
Start: 2021-12-26 | End: 2021-12-26 | Stop reason: HOSPADM

## 2021-12-26 RX ORDER — ONDANSETRON 2 MG/ML
4 INJECTION INTRAMUSCULAR; INTRAVENOUS AS NEEDED
Status: DISCONTINUED | OUTPATIENT
Start: 2021-12-26 | End: 2021-12-26 | Stop reason: HOSPADM

## 2021-12-26 RX ORDER — EPINEPHRINE 0.1 MG/ML
1 INJECTION INTRACARDIAC; INTRAVENOUS
Status: CANCELLED | OUTPATIENT
Start: 2021-12-26 | End: 2021-12-27

## 2021-12-26 RX ORDER — OCTREOTIDE ACETATE 100 UG/ML
50 INJECTION, SOLUTION INTRAVENOUS; SUBCUTANEOUS ONCE
Status: COMPLETED | OUTPATIENT
Start: 2021-12-26 | End: 2021-12-26

## 2021-12-26 RX ORDER — SODIUM CHLORIDE 9 MG/ML
250 INJECTION, SOLUTION INTRAVENOUS AS NEEDED
Status: DISCONTINUED | OUTPATIENT
Start: 2021-12-26 | End: 2021-12-27 | Stop reason: ALTCHOICE

## 2021-12-26 RX ADMIN — Medication 1 AMPULE: at 20:42

## 2021-12-26 RX ADMIN — SODIUM CHLORIDE 40 MG: 9 INJECTION, SOLUTION INTRAMUSCULAR; INTRAVENOUS; SUBCUTANEOUS at 08:08

## 2021-12-26 RX ADMIN — FENTANYL CITRATE 25 MCG: 50 INJECTION INTRAMUSCULAR; INTRAVENOUS at 05:40

## 2021-12-26 RX ADMIN — ONDANSETRON 4 MG: 2 INJECTION INTRAMUSCULAR; INTRAVENOUS at 18:33

## 2021-12-26 RX ADMIN — FENTANYL CITRATE 25 MCG: 50 INJECTION INTRAMUSCULAR; INTRAVENOUS at 14:00

## 2021-12-26 RX ADMIN — FENTANYL CITRATE 25 MCG: 50 INJECTION, SOLUTION INTRAMUSCULAR; INTRAVENOUS at 16:05

## 2021-12-26 RX ADMIN — OCTREOTIDE ACETATE 50 MCG/HR: 500 INJECTION, SOLUTION INTRAVENOUS; SUBCUTANEOUS at 16:01

## 2021-12-26 RX ADMIN — SODIUM CHLORIDE 250 ML: 9 INJECTION, SOLUTION INTRAVENOUS at 04:19

## 2021-12-26 RX ADMIN — FENTANYL CITRATE 25 MCG: 50 INJECTION, SOLUTION INTRAMUSCULAR; INTRAVENOUS at 16:54

## 2021-12-26 RX ADMIN — FENTANYL CITRATE 25 MCG: 50 INJECTION, SOLUTION INTRAMUSCULAR; INTRAVENOUS at 16:41

## 2021-12-26 RX ADMIN — SODIUM CHLORIDE: 9 INJECTION, SOLUTION INTRAVENOUS at 16:05

## 2021-12-26 RX ADMIN — SODIUM CHLORIDE, POTASSIUM CHLORIDE, SODIUM LACTATE AND CALCIUM CHLORIDE 1000 ML: 600; 310; 30; 20 INJECTION, SOLUTION INTRAVENOUS at 05:51

## 2021-12-26 RX ADMIN — FENTANYL CITRATE 25 MCG: 50 INJECTION INTRAMUSCULAR; INTRAVENOUS at 07:38

## 2021-12-26 RX ADMIN — FENTANYL CITRATE 25 MCG: 50 INJECTION INTRAMUSCULAR; INTRAVENOUS at 05:25

## 2021-12-26 RX ADMIN — ONDANSETRON 4 MG: 2 INJECTION INTRAMUSCULAR; INTRAVENOUS at 05:54

## 2021-12-26 RX ADMIN — Medication 10 ML: at 22:06

## 2021-12-26 RX ADMIN — LIDOCAINE HYDROCHLORIDE 80 MG: 20 INJECTION, SOLUTION EPIDURAL; INFILTRATION; INTRACAUDAL; PERINEURAL at 16:10

## 2021-12-26 RX ADMIN — FENTANYL CITRATE 25 MCG: 50 INJECTION INTRAMUSCULAR; INTRAVENOUS at 21:02

## 2021-12-26 RX ADMIN — SODIUM CHLORIDE 40 MG: 9 INJECTION, SOLUTION INTRAMUSCULAR; INTRAVENOUS; SUBCUTANEOUS at 20:42

## 2021-12-26 RX ADMIN — PANTOPRAZOLE SODIUM 40 MG: 40 INJECTION, POWDER, FOR SOLUTION INTRAVENOUS at 02:42

## 2021-12-26 RX ADMIN — PROPOFOL 100 MG: 10 INJECTION, EMULSION INTRAVENOUS at 16:10

## 2021-12-26 RX ADMIN — OCTREOTIDE ACETATE 50 MCG: 100 INJECTION, SOLUTION INTRAVENOUS; SUBCUTANEOUS at 05:54

## 2021-12-26 RX ADMIN — FENTANYL CITRATE 25 MCG: 50 INJECTION INTRAMUSCULAR; INTRAVENOUS at 11:05

## 2021-12-26 RX ADMIN — Medication 10 ML: at 17:57

## 2021-12-26 RX ADMIN — NOREPINEPHRINE BITARTRATE 4 MCG/MIN: 1 INJECTION, SOLUTION, CONCENTRATE INTRAVENOUS at 09:20

## 2021-12-26 RX ADMIN — ERYTHROMYCIN LACTOBIONATE 250 MG: 500 INJECTION, POWDER, LYOPHILIZED, FOR SOLUTION INTRAVENOUS at 18:34

## 2021-12-26 RX ADMIN — FENTANYL CITRATE 25 MCG: 50 INJECTION INTRAMUSCULAR; INTRAVENOUS at 17:56

## 2021-12-26 RX ADMIN — SODIUM CHLORIDE, PRESERVATIVE FREE 10 ML: 5 INJECTION INTRAVENOUS at 15:38

## 2021-12-26 RX ADMIN — SUCCINYLCHOLINE CHLORIDE 140 MG: 20 INJECTION, SOLUTION INTRAMUSCULAR; INTRAVENOUS at 16:10

## 2021-12-26 RX ADMIN — Medication 1 AMPULE: at 08:22

## 2021-12-26 RX ADMIN — FENTANYL CITRATE 25 MCG: 50 INJECTION, SOLUTION INTRAMUSCULAR; INTRAVENOUS at 16:10

## 2021-12-26 RX ADMIN — DEXAMETHASONE SODIUM PHOSPHATE 10 MG: 4 INJECTION, SOLUTION INTRAMUSCULAR; INTRAVENOUS at 16:13

## 2021-12-26 RX ADMIN — SODIUM CHLORIDE 250 ML: 9 INJECTION, SOLUTION INTRAVENOUS at 02:44

## 2021-12-26 RX ADMIN — OCTREOTIDE ACETATE 50 MCG/HR: 500 INJECTION, SOLUTION INTRAVENOUS; SUBCUTANEOUS at 05:52

## 2021-12-26 RX ADMIN — FENTANYL CITRATE 50 MCG: 50 INJECTION, SOLUTION INTRAMUSCULAR; INTRAVENOUS at 02:42

## 2021-12-26 RX ADMIN — ONDANSETRON HYDROCHLORIDE 4 MG: 2 INJECTION, SOLUTION INTRAMUSCULAR; INTRAVENOUS at 16:13

## 2021-12-26 RX ADMIN — SODIUM CHLORIDE, PRESERVATIVE FREE 10 ML: 5 INJECTION INTRAVENOUS at 05:40

## 2021-12-26 RX ADMIN — FENTANYL CITRATE 50 MCG: 50 INJECTION, SOLUTION INTRAMUSCULAR; INTRAVENOUS at 22:05

## 2021-12-26 RX ADMIN — Medication 40 MCG/MIN: at 16:13

## 2021-12-26 RX ADMIN — ONDANSETRON 4 MG: 2 INJECTION INTRAMUSCULAR; INTRAVENOUS at 02:43

## 2021-12-26 RX ADMIN — SODIUM CHLORIDE, PRESERVATIVE FREE 30 ML: 5 INJECTION INTRAVENOUS at 21:07

## 2021-12-26 NOTE — CONSULTS
Blaise Leblanc         NAME:Claude Marton Forget  MRR:[id number]   :1975                      PATIENT SEEN   esrd  Anemia  GI BLEED  Hypotension    Patient Active Problem List    Diagnosis Date Noted    S/p cadaver renal transplant 2012    GIB (gastrointestinal bleeding) 2021    Goals of care, counseling/discussion     DNR (do not resuscitate) discussion     Need for emotional support     Acute COVID-19 2021    Fever 2021    Leucocytosis 2021    Gram-positive bacteremia 2021    Abnormal EKG 09/10/2020    ESRD (end stage renal disease) ([address] 09/10/2020    Pneumonia 2019    Sepsis ([address] 2018    Hypoglycemia 2014    Congestive heart failure, unspecified 2014    ESRD (end stage renal disease) on dialysis ([address] 2013    Dialysis patient ([address] 2013    AICD (automatic cardioverter/defibrillator) present 2012    Arterial occlusion 2012    Peritonitis ([address] 2011    Malnutrition ([address] 2011    DVT (deep venous thrombosis) ([address] 2011    S/P IVC filter 2011    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[address] 2011    HTN (hypertension) 2011    Anemia 2011    S/P appendectomy 2011    High cholesterol     Other ill-defined conditions(799.89)     CAD (coronary artery disease)     Gastrointestinal disorder     Abdominal pain 2011    Nausea & vomiting 2011    Serum total bilirubin elevated 2011    Kidney transplant 2011    Coronary atherosclerosis of native coronary artery 2011         Ede Cruz, [address] [facility]  [facility] SYSTM FRANCISCAN [facility] [address]  [place], [address]  Phone - [phone]         Fax - [phone] [facility] Office  [address] [place] [address]  Phone - [phone]        Fax - [phone]     [email or web address]

## 2021-12-26 NOTE — ED NOTES
Post Fall Documentation      Mari Blevins unwitnessed fall occurred on 12/26/21 at 0330. The answers to the following questions summarize the fall: In the patient's own words,:  · What were you attempting to do when you fell? Ambulated to bathroom  · Do you know why you fell? He felt weak  · Do you have any pain/discomfort or any other complaints? Abdominal pain  · Which part of your body made contact with the floor or other object?  unknown    Nurse:  Nelsy Duckworth Was this an assisted fall? no    Was fall witnessed? No    If witnessed, what part of the body made contact with the floor or other object?  unknown   Patients mental status after the fall/when found: Alert and oriented A/O x 4   Any apparent injury:  No apparent injury   Immediate interventions for injury/suspected injury? No interventions needed pt assessed for injury, CT scan ordered Patient assisted back to bed? Assist X1   Name of provider notified and time, any comments? Dr. Amelia Ross Name of family member notified and time: none      Immediate VS and physical assessment documented in flow sheets. Neuro assessment every hour x 4 (for potential head injury or unwitnessed fall) documented in flow sheets.       Dot Mcconnell

## 2021-12-26 NOTE — ANESTHESIA POSTPROCEDURE EVALUATION
Procedure(s):  ESOPHAGOGASTRODUODENOSCOPY.     general    Anesthesia Post Evaluation        Patient location during evaluation: ICU  Level of consciousness: sleepy but conscious  Airway patency: patent  Anesthetic complications: no  Cardiovascular status: stable  Respiratory status: acceptable        INITIAL Post-op Vital signs:   Vitals Value Taken Time   /72 12/26/21 1715   Temp 37.3 °C (99.1 °F) 12/26/21 1715   Pulse 89 12/26/21 1715   Resp 22 12/26/21 1715   SpO2 100 % 12/26/21 1715

## 2021-12-26 NOTE — PROGRESS NOTES
0800 - 0900 Patient A & O x 4, moves all extrem, on room air. Patient is hypotensive. Discussed with Dr. Pina churchill for patient considering previous covid dx. No orders placed at this time. Order for levophed obtained. Blood to be retrieved from blood bank and administered per intensivist. Assessment documented. Patient had moderate sided melena bm.    0900 - Levophed started to maintain a map > 65.    1200  - Reassessment completed and documented. See flow sheets for details. 1300 - Followed up on consult for GI and paged Dr. Josue Ward. 1510 - 1 of 2 units blood hung per order. Anesthesia at bedside to talk with patient. 1600 - Reassessment completed and documented in flow sheets. OR RNs at bedside to take patient to surgery. 1700 - Patient returned from surgery. Bedside report received from OR team.  Patient had 2 more melena bms. Levophed restarted. End of Shift Note    Bedside shift change report given to MANUELITO Quintero (oncoming nurse) by Jose Antonio Patel RN (offgoing nurse).   Report included the following information SBAR, Kardex, Intake/Output, MAR, Recent Results and Med Rec Status    Shift worked:  7a-7p     Shift summary and any significant changes:     none     Concerns for physician to address:  none     Zone phone for oncoming shift:   n/a       Activity:  Activity Level: Bed Rest  Number times ambulated in hallways past shift: 0  Number of times OOB to chair past shift: 0    Cardiac:   Cardiac Monitoring: Yes      Cardiac Rhythm: Sinus Rhythm    Access:   Current line(s): PIV, central line    Genitourinary:   Urinary status: anuric    Respiratory:   O2 Device: None (Room air)  Chronic home O2 use?: NO  Incentive spirometer at bedside: NO     GI:  Last Bowel Movement Date: 12/26/21  Current diet:  DIET NPO  Passing flatus: YES  Tolerating current diet: YES       Pain Management:   Patient states pain is manageable on current regimen: NO    Skin:  Yong Score: 16  Interventions: float heels    Patient Safety:  Fall Score:  Total Score: 5  Interventions: bed/chair alarm  High Fall Risk: Yes    Length of Stay:  Expected LOS: - - -  Actual LOS: [facility],

## 2021-12-26 NOTE — PERIOP NOTES
Handoff Report from Operating Room to PACU    Report received from WLIDER Bailey CRNA and Josesito Moore RN regarding Lmal Ridges. Surgeon(s):  Bhavna Ramos MD  And Procedure(s) (LRB):  ESOPHAGOGASTRODUODENOSCOPY (N/A)  confirmed   with allergies discussed. Anesthesia type, drugs, patient history, complications, estimated blood loss, vital signs, intake and output, and blood products received, last pain medication, lines and temperature were reviewed.

## 2021-12-26 NOTE — PROCEDURES
[facility]      Procedure Note - Central Venous Access:   Performed by Jeanine Marrero NP    Obtained informed Consent. Immediately prior to the procedure, the patient was reevaluated and found suitable for the planned procedure and any planned medications. Immediately prior to the procedure a time out was called to verify the correct patient, procedure, equipment, staff, and marking as appropriate. Hemorrhagic Shock - Diagnosis    Central line Bundle:  Full sterile barrier precautions used. 7-Step Sterility Protocol followed. (cap, mask sterile gown, sterile gloves, large sterile sheet, hand hygiene, 2% chlorhexidine for cutaneous antisepsis)  5 mL 1% Lidocaine placed at insertion site. Patient positioned in Trendelenburg?yes   The site was prepped with ChloraPrep. Using Seldinger technique a Triple Lumen CVC was placed in the Right, Femoral Vein via direct cannulation with 1 number of attempts for Blood Drawing and IV Access. Ultrasound Guidance was utilized. There was good dark, non-pulsatile blood return in all ports. Femoral Site? yes. If Yes, reason femoral site was chosen: Bilateral IJ stenoses; known SC stenoses  Catheter secured. Biopatch in place? yes. Sterile Bio-occlusive dressing placed. The following complications were encountered: None. A follow-up chest x-ray was not ordered post procedure. VBG ordered. The procedure was tolerated well.       Jeanine Marrero NP  Critical Care Medicine  [facility]

## 2021-12-26 NOTE — PERIOP NOTES
TRANSFER - IN REPORT:    Verbal report received from [facility] (name) on Stef Carty  being received from CCU (unit) for ordered procedure      Report consisted of patients Situation, Background, Assessment and   Recommendations(SBAR). Information from the following report(s) SBAR, Kardex, ED Summary, Intake/Output, Recent Results and Cardiac Rhythm NSR w/ BBB was reviewed with the receiving nurse. Opportunity for questions and clarification was provided. Assessment completed upon patients arrival to unit and care assumed.

## 2021-12-26 NOTE — PROGRESS NOTES
Nephrology Progress Note  Blaise Leblanc     [facility]. [facility]  Phone - [phone]   Patient: Spike Vidal    YOB: 1975        Date- 12/26/2021   Admit Date: 12/26/2021  CC: Follow up for esrd          IMPRESSION & PLAN:   ·  esrd - home HD 5 days per week- Follows up with Dr Nadia Tipton at [facility]  · Covid 19 infection  · Hypotension  · Acute blood loss anemia   · H/o esophageal bleed from esophagitis  · Left arm swelling- s/p angioplasty of left subclavian vein  · Gram positive sepsis from dental abcess  · s/p lead extraction at [facility]  · Anemia of ckd  · Hx of renal tx in past times 2.  · Hypertension  · CAD  · Failed RTX times 2  · H/o DVT, s/p ivc filter/ h/o HIT      PLAN-   No dialysis today -    Hd in am   Continue vasopressor support   epogen TTS   Agree with PRBC TX     Subjective: Interval History:   -   was discharged from [facility] 2 DAYS AGO. He is readmitted with GI bleed. He is hypotensive requiring vasopressor support. He received fluid bolus in er  He is getting prbc tx        Objective:   Vitals:    12/26/21 0800 12/26/21 0807 12/26/21 0845 12/26/21 0900   BP: (!) 79/51 (!) 93/59 (!) 90/37 (!) 75/37   Pulse: 81 85 75 77   Resp: 17 22 22 16   Temp: 97.6 °F (36.4 °C)  97.6 °F (36.4 °C) 98 °F (36.7 °C)   SpO2: 100%  100%    Weight:       Height:          12/25 0701 - 12/26 0700  In: 1056.7 [I.V.:1056.7]  Out: -   Last 3 Recorded Weights in this Encounter    12/26/21 0023   Weight: 61.2 kg (135 lb)      Physical exam:   GEN: NAD  NECK- Supple  RESP: no distress  NEURO:non focal        Chart reviewed. Pertinent Notes reviewed.      Data Review :  Recent Labs     12/26/21  0111 12/24/21  0400    137   K 4.7 3.7    101   CO2 27 29   BUN 32* 17   CREA 7.27* 4.65*   GLU 92 113*   CA 7.5* 7.6*   MG  --  2.3   PHOS  --  4.5     Recent Labs     12/26/21  0111 12/24/21  0400   WBC 12.9* 6.7   HGB 8.2* 9.4*   HCT 26.4* 29.5*    150     No results for input(s): FE, TIBC, PSAT, FERR in the last 72 hours.    Medication list  reviewed  Current Facility-Administered Medications   Medication Dose Route Frequency    ondansetron (ZOFRAN ODT) tablet 8 mg  8 mg Oral NOW    0.9% sodium chloride infusion 250 mL  250 mL IntraVENous PRN    sodium chloride (NS) flush 5-40 mL  5-40 mL IntraVENous Q8H    sodium chloride (NS) flush 5-40 mL  5-40 mL IntraVENous PRN    acetaminophen (TYLENOL) tablet 650 mg  650 mg Oral Q6H PRN    Or    acetaminophen (TYLENOL) suppository 650 mg  650 mg Rectal Q6H PRN    polyethylene glycol (MIRALAX) packet 17 g  17 g Oral DAILY PRN    ondansetron (ZOFRAN) injection 4 mg  4 mg IntraVENous Q6H PRN    octreotide (SANDOSTATIN) 500 mcg in 0.9% sodium chloride 500 mL infusion  50 mcg/hr IntraVENous CONTINUOUS    pantoprazole (PROTONIX) 40 mg in 0.9% sodium chloride 10 mL injection  40 mg IntraVENous Q12H    fentaNYL citrate (PF) injection 25 mcg  25 mcg IntraVENous Q1H PRN    alcohol 62% (NOZIN) nasal  1 Ampule  1 Ampule Topical Q12H    NOREPINephrine (LEVOPHED) 8 mg in 5% dextrose 250mL (32 mcg/mL) infusion  0.5-16 mcg/min IntraVENous TITRATE          Blanca Anderson MD              [facility]  [facility], [address] [facility], [address]  Phone - [phone]               Fax - [phone]

## 2021-12-26 NOTE — ANESTHESIA PREPROCEDURE EVALUATION
Relevant Problems   RESPIRATORY SYSTEM   (+) Pneumonia      CARDIOVASCULAR   (+) CAD (coronary artery disease)   (+) Congestive heart failure, unspecified   (+) Coronary atherosclerosis of native coronary artery   (+) HTN (hypertension)      RENAL FAILURE   (+) ESRD (end stage renal disease) (HCC)   (+) ESRD (end stage renal disease) on dialysis (HCC)      HEMATOLOGY   (+) Anemia       Anesthetic History     PONV          Review of Systems / Medical History  Patient summary reviewed, nursing notes reviewed and pertinent labs reviewed    Pulmonary              Pertinent negatives: No COPD and asthma     Neuro/Psych   Within defined limits  seizures      Pertinent negatives: No CVA   Cardiovascular  Within defined limits  Hypertension      CHF: NYHA Classification III  Dysrhythmias   Pacemaker, past MI, CAD and cardiac stents    Exercise tolerance: <4 METS  Comments: TTE 11/2021  · LV: Estimated LVEF is 35 - 40%. Visually measured ejection fraction. Normal wall thickness. Mildly dilated left ventricle. · TV: Moderate tricuspid valve regurgitation is present. · PA: Moderate to severe pulmonary hypertension. Pulmonary arterial systolic pressure is 85 mmHg.       EKG (12/2021)  Sinus bradycardia   Low voltage QRS   Nonspecific T wave abnormality   Prolonged QT    GI/Hepatic/Renal         Renal disease: ESRD and dialysis  PUD     Endo/Other        Anemia  Pertinent negatives: No diabetes and obesity   Other Findings   Comments: Current admission (12/2021) for COVID PNA and GIB    Last HD on 12/18/21           Physical Exam    Airway  Mallampati: IV  TM Distance: 4 - 6 cm  Neck ROM: decreased range of motion   Mouth opening: Normal     Cardiovascular  Regular rate and rhythm,  S1 and S2 normal,  no murmur, click, rub, or gallop             Dental    Dentition: Poor dentition     Pulmonary                 Abdominal  GI exam deferred       Other Findings            Anesthetic Plan    ASA: 4, emergent  Anesthesia type: general          Induction: Intravenous and RSI  Anesthetic plan and risks discussed with: Patient      Depressed EF with severe pHTN.  Minimize hypercarbia

## 2021-12-26 NOTE — PROGRESS NOTES
0500-TRANSFER - IN REPORT:    Verbal report received from (name) on Misha Campbell  being received from ED(unit) for routine progression of care      Report consisted of patients Situation, Background, Assessment and   Recommendations(SBAR). Information from the following report(s) SBAR, Kardex, ED Summary, Procedure Summary, Intake/Output, MAR and Recent Results was reviewed with the receiving nurse. Opportunity for questions and clarification was provided. Assessment completed upon patients arrival to unit and care assumed. 0530-initial assessment complete, see flowsheet. 0700-Bedside shift change report given to MANUELITO Richards (oncoming nurse) by Olivier Waters RN (offgoing nurse). Report included the following information SBAR, Kardex, ED Summary, Intake/Output, MAR and Recent Results. Pt had a medium sized bloody BM.

## 2021-12-26 NOTE — H&P
History and Physical    Patient: Stef Carty MRN: [id number]  SSN: [id number]    YOB: 1975  Age: 42 y.o. Sex: male      Subjective:      Stef Carty is a 42 y.o. male who has a PMH of ESRD on HD via fistula, recent COVID-19 infection, recent UGIB secondary to gastric varices and esophageal ulcerations c/b hemorrhagic shock, recent S intermedius bacteremia, recurrent DVT, IVC filter, HFrEF 35% s/p AICD, CAD, and severe pulmonary HTN who was recently discharged from [facility] on 12/24 following a prolonged hospital course for GIB secondary to GVs and esophageal ulcerations and also COVID-19 pneumonia. He had been taken off isolation prior to discharge. He presented to the ED on 12/26 reporting 4 episodes of bright red hematemesis and 1 episode of maroon stool. He also reported feeling \"weak and dizzy\". He experienced a syncopal episode in the ED upon standing. H/H 8.6/26.4  As compared with discharge H/H 9.4/29.5. One PIV in the thumb was able to be obtained after multiple attempts. The patient reports that during his prior hospitalization, bilateral IJs were attempted and the Ellie Polio was not able to thread\" and a groin line had to be placed on the right side. Due to the urgent nature of line placement and past history, a R groin CVC was placed upon ICU admission. Of note, he has previously undergone angioplasty of the L subclavian vein. Past Medical History:   Diagnosis Date    Abdominal hematoma     AICD (automatic cardioverter/defibrillator) present     CAD (coronary artery disease)     anterior MI s/p 2 stents  2007    Chronic abdominal pain     Chronic kidney disease     ESRD; Dialysis dependent.  [facility] tpke    DVT (deep venous thrombosis) ([facility] 2001    DVT of popliteal vein ([facility] 11/2011    not anticoagulated due to bleeding, IVC filter    Endocarditis     Gallstone pancreatitis     Gastrointestinal disorder     acid reflux    Gastrointestinal disorder     peptic ulcer    Hemodialysis patient ([facility]     High cholesterol     Hypertension     Kidney transplant     b/l kidneys 1995, 2001    Long term current use of anticoagulant therapy     Nephrotic syndrome     Other ill-defined conditions(799.89)     kidny transplant x2,  on dialysis    Other ill-defined conditions(799.89)     home dialysis    Other ill-defined conditions(799.89)     hx recurrent left leg DVT    Peritonitis ([facility]     Seizures ([facility] 2015    most recent- only had three in lifetime    Small bowel obstruction (HCC)     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[facility]     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    V-tach ([facility]      Past Surgical History:   Procedure Laterality Date    HX APPENDECTOMY      HX CHOLECYSTECTOMY      HX OTHER SURGICAL  11/2011    exploratory laparotomy    HX OTHER SURGICAL      fem-pop    HX OTHER SURGICAL  02/2013    right upper extremity fistula    HX PACEMAKER Left 6/2009    AICD-st latonya-not connected    HX PACEMAKER Left     AICD-left side-BS-working    HX SMALL BOWEL RESECTION      HX TRANSPLANT  2001     Kidney    HX TRANSPLANT  1992    kidney    HX VASCULAR ACCESS Right     femoral access for HD- does 5 day dialysis @ home    IR INSERT NON TUNL CVC OVER 5 YRS  12/7/2021    IR INSERT NON TUNL CVC OVER 5 YRS  12/10/2021    IR REMOVE TUNL CVAD W/O PORT / PUMP  10/29/2020    IR REPLACE CVC TUNNELED W/O PORT  9/10/2020    MI CARDIAC SURG PROCEDURE UNLIST  2007    2 stents    MI EDG US EXAM SURGICAL ALTER STOM DUODENUM/JEJUNUM  7/15/2011         MI ESOPHAGOGASTRODUODENOSCOPY TRANSORAL DIAGNOSTIC  5/24/2012         UPPER GI ENDOSCOPY,CTRL BLEED  12/6/2021         UPPER GI ENDOSCOPY,DIAGNOSIS  12/8/2021         VASCULAR SURGERY PROCEDURE UNLIST  11/14/2017    Insertion AV graft right upper arm (bovine); ligation of AV fistula right arm      Family History   Problem Relation Age of Onset    COPD Mother     Lung Disease Mother     Cancer Father stomach    Cancer Maternal Grandmother      Social History     Tobacco Use    Smoking status: Never Smoker    Smokeless tobacco: Never Used   Substance Use Topics    Alcohol use: No      Prior to Admission medications    Medication Sig Start Date End Date Taking? Authorizing Provider   nadoloL (CORGARD) 20 mg tablet Take 1 Tablet by mouth daily. 12/24/21   Kalpana Watkins MD   metoclopramide HCl (REGLAN) 5 mg tablet Take 1 Tablet by mouth two (2) times a day for 30 days. 12/18/21 1/17/22  Deandra Liao MD   pantoprazole (PROTONIX) 40 mg tablet Take 1 Tablet by mouth Before breakfast and dinner for 30 days. 12/18/21 1/17/22  Deandra Liao MD   ondansetron (Zofran ODT) 4 mg disintegrating tablet 1 Tab by SubLINGual route every eight (8) hours as needed for Nausea or Vomiting. 3/27/21   Hansa Romero MD   metoprolol succinate (TOPROL-XL) 25 mg XL tablet Take 0.5 Tabs by mouth daily. 11/10/20   George Hwang MD   calcium acetate,phosphat bind, (PHOSLO) 667 mg cap Take 2 Caps by mouth three (3) times daily (with meals). Indications: low amount of calcium in the blood, renal osteodystrophy with hyperphosphatemia 11/10/20   George Hwang MD   atorvastatin (Lipitor) 20 mg tablet Take 20 mg by mouth daily. Provider, Historical   acetaminophen (TYLENOL) 500 mg tablet Take 1 Tab by mouth every four (4) hours as needed for Pain. Over the counter 1/20/19   Neida Jauregui MD   calcitRIOL (ROCALTROL) 0.5 mcg capsule Take 0.5 mcg by mouth daily.     Provider, Historical        Allergies   Allergen Reactions    Shellfish Containing Products Swelling     Break out in rash, trouble breathing    Contrast Agent [Iodine] Shortness of Breath    Heparin Analogues Other (comments)     Positive history of HIT       Review of Systems:  As per HPI    Objective:     Vitals:    12/26/21 0023 12/26/21 0345 12/26/21 0424   BP: (!) 90/57 (!) 87/45 (!) 80/41   Pulse: 92 82 82   Resp: 16 16 19   Temp: 97.9 °F (36.6 °C) SpO2: 100% 96% 100%   Weight: 61.2 kg (135 lb)     Height: 5' 7\" (1.702 m)          Physical Exam:  GENERAL: Awake, alert, oriented  EYE:PERRLA  THROAT & NECK: Supple, no JVD  LUNG: CTAB  HEART: RRR, 2+ pulses  ABDOMEN: Soft, nontender, nondistended  EXTREMITIES: 2+ pulses, 2+ edema of BLE, appears chronic. Fistulas in BUE  SKIN: 2+ pulses, chronic appearing skin changes of BLE  NEUROLOGIC:A/O x3, no focal deficits  PSYCHIATRIC:Anxious    Assessment:     Hemorrhagic shock secondary to acute blood loss anemia secondary to UGIB: Complicated by underlying anemia of CKD and baseline thrombocytopenia. - PPI  - Octreotide bolus followed by drip  - PRBC transfusion   - LR bolus  - Pressor if needed to keep MAP >65  - CVC placed  - GI consulted    ESRD on HD: Failed renal transplant x2   - Missed HD on 12/25 due to HD being closed  - Renal consulted, appreciate recs    HFrEF: Not in acute exacerbation. Has AICD. Last EF 35%. - Careful volume management  - Holding metoprolol in the setting of shock    History of HIT  - Avoid heparin products  - Heparin is noted in allergy list    History of DVT  - IVC filter  - Holding anticoagulants due to GIB    Deconditioning: Will need PT/OT when able to participate    S[address]as [address]  I had a face to face encounter with the patient, reviewed and interpreted patient data including clinical events, labs, images, vital signs, I/O's, and examined patient. I have discussed the case and the plan and management of the patient's care with the consulting services, the bedside nurses and the respiratory therapist.     NOTE OF PERSONAL INVOLVEMENT IN CARE   This patient has a high probability of imminent, clinically significant deterioration, which requires the highest level of preparedness to intervene urgently.  I participated in the decision-making and personally managed or directed the management of the following life and organ supporting interventions that required my frequent assessment to treat or prevent imminent deterioration. I personally spent 60 minutes of critical care time. This is time spent at this critically ill patient's bedside actively involved in patient care as well as the coordination of care and discussions with the patient's family. This does not include any procedural time which has been billed separately.     Radha Lau NP  [facility]  12/26/2021          Signed By: Radha Lau NP     December 26, 2021

## 2021-12-26 NOTE — ED NOTES
Pt presents to the ED c/o vomiting blood. Pt states he had been admitted to the hospital last month for hemoptysis and was given blood transfusions. Pt was discharged yesterday afternoon and began vomiting up blood yesterday night. Pt states he feels weak and dizzy.

## 2021-12-26 NOTE — CONSULTS
Gastroenterology Consult     Referring Physician:    Consult Date: 12/26/2021     Subjective:     Chief Complaint: hematemesis    History of Present Illness: Yoanna Muller is a 42 y.o. male who is seen in consultation for hematemesis. He was just discharged 12/24/2021 after an extended stay with complicated GI bleeding. When he went home he had worsening abdominal pain and today 12/26 had 4 episodes of hematemesis bright red blood and two episodes of maroon stool; one just now. He is on low dose levophed. He had a syncopal episode upon standing in ER. Hgb 8.6 now down to 6. He is fatigued. No change in his abdominal pain today. It is very painful to him. No NSAIDs. No smoking. No alcohol. EGD 12/19 - [facility] - dilated esophagus, EGJ ulcer clean base; HILL-4 GEFL. Stomach w/ formed clot. Multiple superficial gastric ulcers. IGV-1 varices possible with slow oozing / gastropathy two clips placed. Duo wnl. EGD 12/8 - GSI - distal esophagus ulcerated w/ large old clot, no active bleeding. Stomach full of blood. Duo wnl. EGD 12/6 - [facility] - fresh bloot with clots, cleared with two ulcers 10mm/5mm, friable, injected 8.5cc epi. Stomach with large clots. Duo not examined. fhx - no known fhx/o gi malignancy    Social - no alcohol, no smoking. Past Medical History:   Diagnosis Date    Abdominal hematoma     AICD (automatic cardioverter/defibrillator) present     CAD (coronary artery disease)     anterior MI s/p 2 stents  2007    Chronic abdominal pain     Chronic kidney disease     ESRD; Dialysis dependent.  Home [facility] does labs- [facility] tpke    DVT (deep venous thrombosis) ([facility] [address] 2001    DVT of popliteal vein ([facility] [address] 11/2011    not anticoagulated due to bleeding, IVC filter    Endocarditis     Gallstone pancreatitis     Gastrointestinal disorder     acid reflux    Gastrointestinal disorder     peptic ulcer    Hemodialysis patient ([facility] [address]     High cholesterol     Hypertension     Kidney transplant     b/l kidneys 1995, 2001    Long term current use of anticoagulant therapy     Nephrotic syndrome     Other ill-defined conditions(799.89)     kidny transplant x2,  on dialysis    Other ill-defined conditions(799.89)     home dialysis    Other ill-defined conditions(799.89)     hx recurrent left leg DVT    Peritonitis ([address]     Seizures ([facility] [address] 2015    most recent- only had three in lifetime    Small bowel obstruction (HCC)     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[facility] [address]     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    V-tach ([facility] [address]      Past Surgical History:   Procedure Laterality Date    HX APPENDECTOMY      HX CHOLECYSTECTOMY      HX OTHER SURGICAL  11/2011    exploratory laparotomy    HX OTHER SURGICAL      fem-pop    HX OTHER SURGICAL  02/2013    right upper extremity fistula    HX PACEMAKER Left 6/2009    AICD-st latonya-not connected    HX PACEMAKER Left     AICD-left side-BS-working    HX SMALL BOWEL RESECTION      HX TRANSPLANT  2001     Kidney    HX TRANSPLANT  1992    kidney    HX VASCULAR ACCESS Right     femoral access for HD- does 5 day dialysis @ home    IR INSERT NON TUNL CVC OVER 5 YRS  12/7/2021    IR INSERT NON TUNL CVC OVER 5 YRS  12/10/2021    IR REMOVE TUNL CVAD W/O PORT / PUMP  10/29/2020    IR REPLACE CVC TUNNELED W/O PORT  9/10/2020    TX CARDIAC SURG PROCEDURE UNLIST  2007    2 stents    TX EDG US EXAM SURGICAL ALTER STOM DUODENUM/JEJUNUM  7/15/2011         TX ESOPHAGOGASTRODUODENOSCOPY TRANSORAL DIAGNOSTIC  5/24/2012         UPPER GI ENDOSCOPY,CTRL BLEED  12/6/2021         UPPER GI ENDOSCOPY,DIAGNOSIS  12/8/2021         VASCULAR SURGERY PROCEDURE UNLIST  11/14/2017    Insertion AV graft right upper arm (bovine); ligation of AV fistula right arm      Family History   Problem Relation Age of Onset    COPD Mother     Lung Disease Mother     Cancer Father         stomach    Cancer Maternal Grandmother      Social History     Tobacco Use    Smoking status: Never Smoker    Smokeless tobacco: Never Used   Substance Use Topics    Alcohol use: No      Allergies   Allergen Reactions    Shellfish Containing Products Swelling     Break out in rash, trouble breathing    Contrast Agent [Iodine] Shortness of Breath    Heparin Analogues Other (comments)     Positive history of HIT     Current Facility-Administered Medications   Medication Dose Route Frequency    0.9% sodium chloride infusion 250 mL  250 mL IntraVENous PRN    sodium chloride (NS) flush 5-40 mL  5-40 mL IntraVENous Q8H    sodium chloride (NS) flush 5-40 mL  5-40 mL IntraVENous PRN    acetaminophen (TYLENOL) tablet 650 mg  650 mg Oral Q6H PRN    Or    acetaminophen (TYLENOL) suppository 650 mg  650 mg Rectal Q6H PRN    polyethylene glycol (MIRALAX) packet 17 g  17 g Oral DAILY PRN    ondansetron (ZOFRAN) injection 4 mg  4 mg IntraVENous Q6H PRN    octreotide (SANDOSTATIN) 500 mcg in 0.9% sodium chloride 500 mL infusion  50 mcg/hr IntraVENous CONTINUOUS    pantoprazole (PROTONIX) 40 mg in 0.9% sodium chloride 10 mL injection  40 mg IntraVENous Q12H    alcohol 62% (NOZIN) nasal  1 Ampule  1 Ampule Topical Q12H    NOREPINephrine (LEVOPHED) 8 mg in 5% dextrose 250mL (32 mcg/mL) infusion  0.5-16 mcg/min IntraVENous TITRATE    fentaNYL citrate (PF) injection 25 mcg  25 mcg IntraVENous Q3H    [START ON 12/27/2021] epoetin emilie-epbx (RETACRIT) injection 20,000 Units  20,000 Units SubCUTAneous Q MON, WED & FRI    0.9% sodium chloride infusion 250 mL  250 mL IntraVENous PRN        Review of Systems:  14pt ROS negative except per HPI      Objective:     Physical Exam:  Visit Vitals  /69   Pulse 92   Temp 99.2 °F (37.3 °C)   Resp 23   Ht 5' 7\" (1.702 m)   Wt 61.2 kg (135 lb)   SpO2 100%   BMI 21.14 kg/m²      General: chronically ill  [place] male, no distress but is uncomfortable  Heent: eyes are anicteric; sclera are pale  CV: tachycardic, otherwise   Resp: clear to auscultation anteriorly, no wheezing and no work of breathing. Abd: soft, moderate TTP in epigastric area to palpation. Nondistended. Melenic stool smell appreciated, rectal deferred. Skin: HD fistula bilateral upper extremities  Neuro: AxOx4, no focal deficits  Psych: anxious and tearful at times; affect congruent. Pleasant. Lab Results   Component Value Date/Time    WBC 12.9 (H) 12/26/2021 01:11 AM    Hemoglobin (POC) 10.5 (L) 11/14/2017 07:00 AM    HGB 6.0 (L) 12/26/2021 01:52 PM    Hematocrit (POC) 36 (L) 09/08/2020 06:46 AM    HCT 19.0 (L) 12/26/2021 01:52 PM    PLATELET 981 43/15/5060 01:11 AM    MCV 95.7 12/26/2021 01:11 AM       Laboratory:    Recent Results (from the past 24 hour(s))   CBC WITH AUTOMATED DIFF    Collection Time: 12/26/21  1:11 AM   Result Value Ref Range    WBC 12.9 (H) 4.1 - 11.1 K/uL    RBC 2.76 (L) 4.10 - 5.70 M/uL    HGB 8.2 (L) 12.1 - 17.0 g/dL    HCT 26.4 (L) 36.6 - 50.3 %    MCV 95.7 80.0 - 99.0 FL    MCH 29.7 26.0 - 34.0 PG    MCHC 31.1 30.0 - 36.5 g/dL    RDW 19.1 (H) 11.5 - 14.5 %    PLATELET 361 384 - 717 K/uL    MPV 11.9 8.9 - 12.9 FL    NRBC 0.5 (H) 0  WBC    ABSOLUTE NRBC 0.06 (H) 0.00 - 0.01 K/uL    NEUTROPHILS 54 32 - 75 %    LYMPHOCYTES 27 12 - 49 %    MONOCYTES 11 5 - 13 %    EOSINOPHILS 6 0 - 7 %    BASOPHILS 1 0 - 1 %    IMMATURE GRANULOCYTES 1 (H) 0.0 - 0.5 %    ABS. NEUTROPHILS 7.1 1.8 - 8.0 K/UL    ABS. LYMPHOCYTES 3.4 0.8 - 3.5 K/UL    ABS. MONOCYTES 1.4 (H) 0.0 - 1.0 K/UL    ABS. EOSINOPHILS 0.8 (H) 0.0 - 0.4 K/UL    ABS. BASOPHILS 0.2 (H) 0.0 - 0.1 K/UL    ABS. IMM.  GRANS. 0.1 (H) 0.00 - 0.04 K/UL    DF AUTOMATED     METABOLIC PANEL, COMPREHENSIVE    Collection Time: 12/26/21  1:11 AM   Result Value Ref Range    Sodium 136 136 - 145 mmol/L    Potassium 4.7 3.5 - 5.1 mmol/L    Chloride 102 97 - 108 mmol/L    CO2 27 21 - 32 mmol/L    Anion gap 7 5 - 15 mmol/L    Glucose 92 65 - 100 mg/dL    BUN 32 (H) 6 - 20 MG/DL    Creatinine 7.27 (H) 0.70 - 1.30 MG/DL    BUN/Creatinine ratio 4 (L) 12 - 20      GFR est AA 10 (L) >60 ml/min/1.73m2    GFR est non-AA 8 (L) >60 ml/min/1.73m2    Calcium 7.5 (L) 8.5 - 10.1 MG/DL    Bilirubin, total 0.5 0.2 - 1.0 MG/DL    ALT (SGPT) 36 12 - 78 U/L    AST (SGOT) 54 (H) 15 - 37 U/L    Alk. phosphatase 177 (H) 45 - 117 U/L    Protein, total 5.9 (L) 6.4 - 8.2 g/dL    Albumin 1.9 (L) 3.5 - 5.0 g/dL    Globulin 4.0 2.0 - 4.0 g/dL    A-G Ratio 0.5 (L) 1.1 - 2.2     TYPE & SCREEN    Collection Time: 12/26/21  2:12 AM   Result Value Ref Range    Crossmatch Expiration 12/29/2021,2359     ABO/Rh(D) O POSITIVE     Antibody screen NEG     Unit number [id number]     Blood component type  LR     Unit division 00     Status of unit REL FROM [facility]     Crossmatch result Compatible     Unit number [id number]     Blood component type  LR,2     Unit division 00     Status of unit ISSUED     Crossmatch result Compatible    PROTHROMBIN TIME + INR    Collection Time: 12/26/21  2:12 AM   Result Value Ref Range    INR 1.2 (H) 0.9 - 1.1      Prothrombin time 12.8 (H) 9.0 - 11.1 sec   PTT    Collection Time: 12/26/21  2:12 AM   Result Value Ref Range    aPTT 22.7 22.1 - 31.0 sec    aPTT, therapeutic range     58.0 - 77.0 SECS   RBC, ALLOCATE    Collection Time: 12/26/21  4:15 AM   Result Value Ref Range    HISTORY CHECKED?  Historical check performed    POC VENOUS BLOOD GAS    Collection Time: 12/26/21  5:55 AM   Result Value Ref Range    Device: ROOM AIR      FIO2 (POC) 21 %    pH, venous (POC) 7.37 7.32 - 7.42      pCO2, venous (POC) 45.0 41 - 51 MMHG    pO2, venous (POC) 18 (L) 25 - 40 mmHg    HCO3, venous (POC) 26.2 23.0 - 28.0 MMOL/L    sO2, venous (POC) 24.7 (L) 65 - 88 %    Base excess, venous (POC) 0.9 mmol/L    Allens test (POC) NOT APPLICABLE      Site PICC      Specimen type (POC) VENOUS BLOOD      Performed by Rocco Kirkpatrick    HGB & HCT    Collection Time: 12/26/21  1:52 PM   Result Value Ref Range    HGB 6.0 (L) 12.1 - 17.0 g/dL    HCT 19.0 (L) 36.6 - 50.3 % Assessment/Plan:     Active Problems:    GIB (gastrointestinal bleeding) (12/26/2021)       Mr. 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 IGV-1 vs esophageal ulcerations, recurrent DVT with IVC filter, HFrEF 35% with AICD, CAD, severe pulmonary artery HTN RVSP 85 on TTE 11/7 but 11/11 with moderate PASP, with hematemesis. Will transfuse 2u pRBC. Will prepare for EGD in OR today. He is critically ill and is already on vasopressors. Informed consent was obtained for the procedure, including risks of hemorrhage, aspiration, infection, bleeding, perforation which could require immediate surgery, cardiopulmonary complication, issues with anesthesia, inability to stop the bleeding which may require alternative treatment methods including surgery / IR, and death. Jeanne Bonny wishes to continue. All questions answered.

## 2021-12-26 NOTE — PROCEDURES
NAME:  Claudell Patch   :   1975   MRN:   [id number]     Date/Time:  2021 5:06 PM    Esophagogastroduodenoscopy (EGD) Procedure Note    : Nilay Sun MD    Staff: Circ-1: Lillian Lindsey RN  Endoscopy [facility]: [facility]  Endoscopy Technician-2: Erica Garcia  Endoscopy RN-1: Diaz Lema RN     Referring Provider:  Adela Humphrey MD    Anethesia/Sedation:  General anesthesia    Preoperative diagnosis: anemia    Postoperative diagnosis: SEVERE GASTRITIS, ESOPHAGEAL ULCER, GASTRIC ULCERS WITH BLEEDING    Procedure Details     After infom consent was obtained for the procedure, with all risks and benefits of procedure explained the patient was taken to the endoscopy suite and placed in the left lateral decubitus position. Following sequential administration of sedation as per above, the  as well as the therapeutic 2-channel 180 scope gastroscope was inserted into the mouth and advanced under direct vision to second portion of the duodenum. A careful inspection was made as the gastroscope was withdrawn, including a retroflexed view of the proximal stomach; findings and interventions are described below. Findings:  Esophagus: Normal esophagus until the EGJ at ~38cm with two healing esophageal ulcers without stigmata of bleeding. Stomach retroflexion with GEFL 4. Stomach: Multiple findings. Gastric retroflexion with large gastric varices as well as two hemoclips placed previously. One of the gastric varices was oozing blood. This stopped spontaneously by the end of the case after repeated observations and lavaging. Large old clot in the fundus, which despite numerous attempts to utilize full suction and maneuver the patient was unable to move clot into the antrum. Lavage and suctioning water did not reveal any hemorrhage beneath the clot from what I could tell. On the gastric body was a long ~10cm linear ulcer which was oozing.  This was surrounded by diffuse severe gastropathy which also was extremely friable and oozing at times. This ooze was minimal compared to part of the linear ulcer. The most distal end of the ulcer had a larger red area which was oozing much heavier flow, without adherent clot or appreciable vessel underneath. This area was injected with 5cc epinephrine with excellent blanching. The other oozing gastropathy stopped as well. Two hemoclips were placed with difficulty closing the clips due to the severe gastropathy, however they with slow and steady pressure and closure they did not slip and had excellent closure of the area. The bleeding stopped by the end of the procedure. Unfortunately hemospray was not available, and it is also not available as Erin's either as this would have worked beautifully. Duodenum/jejunum:normal          EBL:  Mild - moderate amount, unable to quantify due to amount of lavage utilized. Complications:   None; patient tolerated the procedure well. Impression:    See Postoperative diagnosis above    Recommendations:  - High dose PPI 40mg IV BID. Preferably would be a gtt, defer to ICU team as it may have a higher volume with his ESRD. - Recommend if he has a diffuse drop in hgb to contact IR for consideration of therapy for the gastric varix. - If he continues to ooze blood, may consider APC, however with the diffuse gastropathy I have concern that this will only cause further ulceration and eventual oozing and bleeding.  - In my opinion he is not a candidate for TIPS given his severe R heart failure. Since that TTE / EVER was in 11/2021, may consider repeating this to see if his R heart function has improved. Defer to IR for final decision.  - There is no further endoscopic therapy we can deploy unless we can get Hemospray. This is a temporary measure, and may buy 72hrs for hemostasis and healing of the gastropathy. It may need to be repeated pending symptoms / labs.   This really is only a band-aid, but is an option.  - Consider transfer to facility which has hemospray ([facility]). - In my opinion he needs hospice if we can not get hemospray or transfer to a facility which has hemospray.     Discharge disposition:  ICU  Ayan Wood MD

## 2021-12-26 NOTE — PERIOP NOTES
TRANSFER - OUT REPORT:    Verbal report given to Jaú RN(name) on Hosea Pinedo  being transferred to [facility](unit) for routine post - op       Report consisted of patients Situation, Background, Assessment and   Recommendations(SBAR). Information from the following report(s) SBAR, OR Summary, Procedure Summary, Intake/Output, MAR and Cardiac Rhythm NSR was reviewed with the receiving nurse. Opportunity for questions and clarification was provided.       Patient transported with:   Registered Nurse

## 2021-12-27 LAB
ANION GAP SERPL CALC-SCNC: 10 MMOL/L (ref 5–15)
ATRIAL RATE: 74 BPM
BUN SERPL-MCNC: 53 MG/DL (ref 6–20)
BUN/CREAT SERPL: 6 (ref 12–20)
CALCIUM SERPL-MCNC: 7.4 MG/DL (ref 8.5–10.1)
CALCULATED P AXIS, ECG09: 54 DEGREES
CALCULATED R AXIS, ECG10: 25 DEGREES
CALCULATED T AXIS, ECG11: 137 DEGREES
CHLORIDE SERPL-SCNC: 105 MMOL/L (ref 97–108)
CO2 SERPL-SCNC: 22 MMOL/L (ref 21–32)
CREAT SERPL-MCNC: 8.23 MG/DL (ref 0.7–1.3)
DIAGNOSIS, 93000: NORMAL
ERYTHROCYTE [DISTWIDTH] IN BLOOD BY AUTOMATED COUNT: 17.7 % (ref 11.5–14.5)
GLUCOSE BLD STRIP.AUTO-MCNC: 124 MG/DL (ref 65–117)
GLUCOSE SERPL-MCNC: 128 MG/DL (ref 65–100)
HCT VFR BLD AUTO: 22.3 % (ref 36.6–50.3)
HGB BLD-MCNC: 7.1 G/DL (ref 12.1–17)
MAGNESIUM SERPL-MCNC: 2.6 MG/DL (ref 1.6–2.4)
MCH RBC QN AUTO: 29.7 PG (ref 26–34)
MCHC RBC AUTO-ENTMCNC: 31.8 G/DL (ref 30–36.5)
MCV RBC AUTO: 93.3 FL (ref 80–99)
NRBC # BLD: 0.04 K/UL (ref 0–0.01)
NRBC BLD-RTO: 0.5 PER 100 WBC
P-R INTERVAL, ECG05: 166 MS
PHOSPHATE SERPL-MCNC: 5.8 MG/DL (ref 2.6–4.7)
PLATELET # BLD AUTO: 189 K/UL (ref 150–400)
PMV BLD AUTO: 11.7 FL (ref 8.9–12.9)
POTASSIUM SERPL-SCNC: 6.3 MMOL/L (ref 3.5–5.1)
Q-T INTERVAL, ECG07: 502 MS
QRS DURATION, ECG06: 98 MS
QTC CALCULATION (BEZET), ECG08: 557 MS
RBC # BLD AUTO: 2.39 M/UL (ref 4.1–5.7)
SERVICE CMNT-IMP: ABNORMAL
SODIUM SERPL-SCNC: 137 MMOL/L (ref 136–145)
VENTRICULAR RATE, ECG03: 74 BPM
WBC # BLD AUTO: 7.3 K/UL (ref 4.1–11.1)

## 2021-12-27 PROCEDURE — 80048 BASIC METABOLIC PNL TOTAL CA: CPT

## 2021-12-27 PROCEDURE — 85027 COMPLETE CBC AUTOMATED: CPT

## 2021-12-27 PROCEDURE — 90935 HEMODIALYSIS ONE EVALUATION: CPT

## 2021-12-27 PROCEDURE — 83735 ASSAY OF MAGNESIUM: CPT

## 2021-12-27 PROCEDURE — 74011250636 HC RX REV CODE- 250/636: Performed by: INTERNAL MEDICINE

## 2021-12-27 PROCEDURE — 65610000006 HC RM INTENSIVE CARE

## 2021-12-27 PROCEDURE — C9113 INJ PANTOPRAZOLE SODIUM, VIA: HCPCS | Performed by: NURSE PRACTITIONER

## 2021-12-27 PROCEDURE — 2709999900 HC NON-CHARGEABLE SUPPLY

## 2021-12-27 PROCEDURE — 77030040393 HC DRSG OPTIFOAM GENT MDII -B

## 2021-12-27 PROCEDURE — 74011250637 HC RX REV CODE- 250/637: Performed by: NURSE PRACTITIONER

## 2021-12-27 PROCEDURE — 74011636637 HC RX REV CODE- 636/637: Performed by: STUDENT IN AN ORGANIZED HEALTH CARE EDUCATION/TRAINING PROGRAM

## 2021-12-27 PROCEDURE — 82962 GLUCOSE BLOOD TEST: CPT

## 2021-12-27 PROCEDURE — 84100 ASSAY OF PHOSPHORUS: CPT

## 2021-12-27 PROCEDURE — 74011250636 HC RX REV CODE- 250/636: Performed by: NURSE PRACTITIONER

## 2021-12-27 PROCEDURE — 5A1D70Z PERFORMANCE OF URINARY FILTRATION, INTERMITTENT, LESS THAN 6 HOURS PER DAY: ICD-10-PCS | Performed by: HOSPITALIST

## 2021-12-27 PROCEDURE — 36415 COLL VENOUS BLD VENIPUNCTURE: CPT

## 2021-12-27 PROCEDURE — 74011000250 HC RX REV CODE- 250: Performed by: NURSE PRACTITIONER

## 2021-12-27 PROCEDURE — 74011636637 HC RX REV CODE- 636/637: Performed by: NURSE PRACTITIONER

## 2021-12-27 RX ORDER — FENTANYL CITRATE 50 UG/ML
50 INJECTION, SOLUTION INTRAMUSCULAR; INTRAVENOUS
Status: DISCONTINUED | OUTPATIENT
Start: 2021-12-27 | End: 2021-12-29

## 2021-12-27 RX ORDER — DIPHENHYDRAMINE HCL 25 MG
50 CAPSULE ORAL ONCE
Status: DISCONTINUED | OUTPATIENT
Start: 2021-12-27 | End: 2021-12-27

## 2021-12-27 RX ORDER — OXYCODONE AND ACETAMINOPHEN 5; 325 MG/1; MG/1
1 TABLET ORAL
Status: DISCONTINUED | OUTPATIENT
Start: 2021-12-27 | End: 2022-01-09

## 2021-12-27 RX ORDER — DIPHENHYDRAMINE HYDROCHLORIDE 50 MG/ML
50 INJECTION, SOLUTION INTRAMUSCULAR; INTRAVENOUS ONCE
Status: DISCONTINUED | OUTPATIENT
Start: 2021-12-27 | End: 2021-12-27

## 2021-12-27 RX ADMIN — SODIUM CHLORIDE 40 MG: 9 INJECTION, SOLUTION INTRAMUSCULAR; INTRAVENOUS; SUBCUTANEOUS at 08:52

## 2021-12-27 RX ADMIN — SODIUM CHLORIDE, PRESERVATIVE FREE 10 ML: 5 INJECTION INTRAVENOUS at 14:49

## 2021-12-27 RX ADMIN — FENTANYL CITRATE 50 MCG: 50 INJECTION, SOLUTION INTRAMUSCULAR; INTRAVENOUS at 04:22

## 2021-12-27 RX ADMIN — OCTREOTIDE ACETATE 50 MCG/HR: 500 INJECTION, SOLUTION INTRAVENOUS; SUBCUTANEOUS at 03:08

## 2021-12-27 RX ADMIN — OCTREOTIDE ACETATE 50 MCG/HR: 500 INJECTION, SOLUTION INTRAVENOUS; SUBCUTANEOUS at 14:47

## 2021-12-27 RX ADMIN — ONDANSETRON 4 MG: 2 INJECTION INTRAMUSCULAR; INTRAVENOUS at 14:55

## 2021-12-27 RX ADMIN — FENTANYL CITRATE 50 MCG: 0.05 INJECTION, SOLUTION INTRAMUSCULAR; INTRAVENOUS at 16:17

## 2021-12-27 RX ADMIN — Medication 1 AMPULE: at 20:35

## 2021-12-27 RX ADMIN — FENTANYL CITRATE 50 MCG: 50 INJECTION, SOLUTION INTRAMUSCULAR; INTRAVENOUS at 06:38

## 2021-12-27 RX ADMIN — EPOETIN ALFA-EPBX 20000 UNITS: 20000 INJECTION, SOLUTION INTRAVENOUS; SUBCUTANEOUS at 23:58

## 2021-12-27 RX ADMIN — SODIUM CHLORIDE 40 MG: 9 INJECTION, SOLUTION INTRAMUSCULAR; INTRAVENOUS; SUBCUTANEOUS at 20:34

## 2021-12-27 RX ADMIN — FENTANYL CITRATE 50 MCG: 0.05 INJECTION, SOLUTION INTRAMUSCULAR; INTRAVENOUS at 12:17

## 2021-12-27 RX ADMIN — FENTANYL CITRATE 50 MCG: 50 INJECTION, SOLUTION INTRAMUSCULAR; INTRAVENOUS at 08:53

## 2021-12-27 RX ADMIN — OCTREOTIDE ACETATE 50 MCG/HR: 500 INJECTION, SOLUTION INTRAVENOUS; SUBCUTANEOUS at 23:59

## 2021-12-27 RX ADMIN — SODIUM CHLORIDE, PRESERVATIVE FREE 20 ML: 5 INJECTION INTRAVENOUS at 06:38

## 2021-12-27 RX ADMIN — PREDNISONE 50 MG: 20 TABLET ORAL at 23:59

## 2021-12-27 RX ADMIN — FENTANYL CITRATE 50 MCG: 0.05 INJECTION, SOLUTION INTRAMUSCULAR; INTRAVENOUS at 20:34

## 2021-12-27 RX ADMIN — Medication 1 AMPULE: at 08:54

## 2021-12-27 RX ADMIN — FENTANYL CITRATE 50 MCG: 50 INJECTION, SOLUTION INTRAMUSCULAR; INTRAVENOUS at 01:46

## 2021-12-27 RX ADMIN — SODIUM CHLORIDE, PRESERVATIVE FREE 30 ML: 5 INJECTION INTRAVENOUS at 22:59

## 2021-12-27 NOTE — PROGRESS NOTES
ICU Progress Note        Subjective: Overnight events noted. Vital Signs:    Visit Vitals  /69   Pulse 77   Temp 98 °F (36.7 °C)   Resp 17   Ht 5' 7\" (1.702 m)   Wt 61.2 kg (135 lb)   SpO2 100%   BMI 21.14 kg/m²       O2 Device: None (Room air)   O2 Flow Rate (L/min): 2 l/min   Temp (24hrs), Av.9 °F (37.2 °C), Min:97.6 °F (36.4 °C), Max:100 °F (37.8 °C)       Intake/Output:   Last shift:      No intake/output data recorded. Last 3 shifts:  190 -  0700  In: 3223 [I.V.:2603]  Out: -     Intake/Output Summary (Last 24 hours) at 2021 1348  Last data filed at 2021 0700  Gross per 24 hour   Intake 1856.3 ml   Output --   Net 1856.3 ml            Physical Exam:    General: Alert, awake and oriented.  Not in acute distress  HEENT:  Anicteric sclerae; pink palpebral conjunctivae; mucosa moist  Resp:  Bilateral air entry +, no crackles or wheeze  CV:  S1, S2 present  GI:  Abdomen soft, non-tender; (+) active bowel sounds  Extremities:  +2 pulses on all extremities; no edema/ cyanosis/ clubbing noted  Skin:  Warm; no rashes/ lesions noted  Neurologic:  Non-focal    DATA:     Current Facility-Administered Medications   Medication Dose Route Frequency    fentaNYL citrate (PF) injection 50 mcg  50 mcg IntraVENous Q4H PRN    oxyCODONE-acetaminophen (PERCOCET) 5-325 mg per tablet 1 Tablet  1 Tablet Oral Q4H PRN    sodium chloride (NS) flush 5-40 mL  5-40 mL IntraVENous Q8H    sodium chloride (NS) flush 5-40 mL  5-40 mL IntraVENous PRN    acetaminophen (TYLENOL) tablet 650 mg  650 mg Oral Q6H PRN    Or    acetaminophen (TYLENOL) suppository 650 mg  650 mg Rectal Q6H PRN    polyethylene glycol (MIRALAX) packet 17 g  17 g Oral DAILY PRN    ondansetron (ZOFRAN) injection 4 mg  4 mg IntraVENous Q6H PRN    octreotide (SANDOSTATIN) 500 mcg in 0.9% sodium chloride 500 mL infusion  50 mcg/hr IntraVENous CONTINUOUS    pantoprazole (PROTONIX) 40 mg in 0.9% sodium chloride 10 mL injection  40 mg IntraVENous Q12H    alcohol 62% (NOZIN) nasal  1 Ampule  1 Ampule Topical Q12H    NOREPINephrine (LEVOPHED) 8 mg in 5% dextrose 250mL (32 mcg/mL) infusion  0.5-16 mcg/min IntraVENous TITRATE    epoetin emilie-epbx (RETACRIT) injection 20,000 Units  20,000 Units SubCUTAneous Q MON, WED & FRI         Labs: Results:       Chemistry Recent Labs     12/27/21 0421 12/26/21 0111   * 92    136   K 6.3* 4.7    102   CO2 22 27   BUN 53* 32*   CREA 8.23* 7.27*   CA 7.4* 7.5*   AGAP 10 7   BUCR 6* 4*   AP  --  177*   TP  --  5.9*   ALB  --  1.9*   GLOB  --  4.0   AGRAT  --  0.5*      CBC w/Diff Recent Labs     12/27/21  0421 12/26/21  1803 12/26/21  1352 12/26/21 0111 12/26/21 0111   WBC 7.3  --   --   --  12.9*   RBC 2.39*  --   --   --  2.76*   HGB 7.1* 7.2* 6.0*   < > 8.2*   HCT 22.3* 22.1* 19.0*   < > 26.4*     --   --   --  156   GRANS  --   --   --   --  54   LYMPH  --   --   --   --  27   EOS  --   --   --   --  6    < > = values in this interval not displayed. Coagulation Recent Labs     12/26/21  0212   PTP 12.8*   INR 1.2*   APTT 22.7       Liver Enzymes Recent Labs     12/26/21 0111   TP 5.9*   ALB 1.9*   *      ABG No results found for: PH, PHI, PCO2, PCO2I, PO2, PO2I, HCO3, HCO3I, FIO2, FIO2I   Microbiology No results for input(s): CULT in the last 72 hours. maging:  CXR Results  (Last 48 hours)               12/26/21 0631  XR CHEST PORT Final result    Impression:  Improved bilateral pleural effusions and overall improvement in the   diffuse bilateral pulmonary infiltrates. Narrative: Indication: Follow-up abnormal chest x-ray, status post extubation       Comparison to 12/9/2021. Portable exam obtained at 623 demonstrates stable   cardiomegaly. Bilateral pleural effusions have improved along with improved   aeration in the lower lobes bilaterally.  Diffuse bilateral pulmonary infiltrates   persist, with substantial improvement compared to the prior exam. The ET tube   has been removed in the interval.                 CT Results  (Last 48 hours)               12/26/21 0352  CT HEAD WO CONT Final result    Impression:  No acute intracranial process. Narrative:  CLINICAL HISTORY: fall   INDICATION: fall   COMPARISON: 2014. CT dose reduction was achieved through use of a standardized protocol tailored   for this examination and automatic exposure control for dose modulation. TECHNIQUE: Serial axial images with a collimation of 5 mm were obtained from the   skull base through the vertex     FINDINGS:    The sulci and ventricles are within normal limits for patient age. There is no   evidence of an acute infarction, hemorrhage, or mass-effect. There is no   evidence of midline shift or hydrocephalus. Posterior fossa structures are   unremarkable. No extra-axial collections are seen. Mastoid air cells are well pneumatized and clear. There is no evidence of depressed skull fractures of soft tissue swelling. 12/26/21 0352  CT SPINE CERV WO CONT Final result    Impression:  Diffuse osseous sclerosis compatible with the patient's known history of renal   disease. No acute osseous abnormality. Mild biapical interstitial infiltrates. Of note, this examination became available for interpretation at 6:20 AM.       Narrative:  EXAM:  CT CERVICAL SPINE WITHOUT CONTRAST       INDICATION: fall. COMPARISON: None. CONTRAST:  None. TECHNIQUE: Multislice helical CT of the cervical spine was performed without   intravenous contrast administration. Sagittal and coronal reformats were   generated. CT dose reduction was achieved through use of a standardized   protocol tailored for this examination and automatic exposure control for dose   modulation. FINDINGS:       The alignment is within normal limits. There is no fracture or compression   deformity. The odontoid process is intact.  The craniocervical junction is within   normal limits. Minimal interstitial infiltrates are noted in the lung apices   bilaterally. The bones are diffusely sclerotic compatible with the patient's   known history of renal disease. The incidentally imaged soft tissues are within normal limits. C2-C3: There is no spinal canal or neural foraminal stenosis. C3-C4: There is no spinal canal or neural foraminal stenosis. C4-C5: There is no spinal canal or neural foraminal stenosis. C5-C6: Disc space narrowing is noted at C5-C6 without spinal stenosis. C6-C7: There is no spinal canal or neural foraminal stenosis. C7-T1: There is no spinal canal or neural foraminal stenosis. Assessment and Plan:    Adolph Quevedo is a 42 y.o. male who has a PMH of ESRD on HD via fistula, recent COVID-19 infection, recent UGIB secondary to gastric varices and esophageal ulcerations c/b hemorrhagic shock, recent S intermedius bacteremia, recurrent DVT, IVC filter, HFrEF 35% s/p AICD, CAD, and severe pulmonary HTN who was recently discharged from [facility] on 12/24 following a prolonged hospital course for GIB secondary to GVs and esophageal ulcerations and also COVID-19 pneumonia. He had been taken off isolation prior to discharge. He presented to the ED on 12/26 reporting 4 episodes of bright red hematemesis and 1 episode of maroon stool. He also reported feeling \"weak and dizzy\". He experienced a syncopal episode in the ED upon standing. H/H 8.6/26.4    Acute GI bleed/Hemorrhagic shock - complicated by anemia of CKD and thrombocytopenia. He is known to have gastric varices, unfortunately not a candidate for endoscopic intervention. GI spoke to IR, they are planning on embolization procedure. Keep NPO. Cont. PPI and Octreotide. Monitor H/H and transfuse as needed for Hb >7 gm/dl. Cont. Levophed to keep MAP 65 and above. ESRD on HD - failed renal transplant. Cont. HD as per nephrology.      HHrEF/Pulmonary hypertension - EF 35%. Has AICD. Cont. To monitor closely. Keep euvolemic. Hold beta blockers (hypotensive). History of  HIT - Avoid heparin. History of DVT - IVC filter. Hold anticoagulants due to GI bleed. Pain - Patient is on chronic pain medications (percocet at home). He has been asking for pain medications every 1 hour here for abdominal pain. Will need counseling. CCM time - 40 minutes.        Minh Ramírez MD, FCCP, ATSF, FACP, DAABIP  Interventional Pulmonology/Critical [address] [place] [address]

## 2021-12-27 NOTE — PROGRESS NOTES
Spiritual Care Assessment/Progress Note  [facility]      NAME: Tomi Whiteside      MRN: [id number]  AGE: 42 y.o. SEX: male  Sabianism Affiliation: Mandaeism   Language: English     12/27/2021     Total Time (in minutes): 5     Spiritual Assessment begun in [facility] CRITICAL CARE 2 through conversation with:         []Patient        [] Family    [] Friend(s)        Reason for Consult: Initial/Spiritual assessment, critical care     Spiritual beliefs: (Please include comment if needed)     [] Identifies with a brown tradition:         [] Supported by a brown community:            [] Claims no spiritual orientation:           [] Seeking spiritual identity:                [] Adheres to an individual form of spirituality:           [x] Not able to assess:                           Identified resources for coping:      [] Prayer                               [] Music                  [] Guided Imagery     [] Family/friends                 [] Pet visits     [] Devotional reading                         [] Unknown     [] Other:                                           Interventions offered during this visit: (See comments for more details)    Patient Interventions: Initial visit           Plan of Care:     [] Support spiritual and/or cultural needs    [] Support AMD and/or advance care planning process      [] Support grieving process   [] Coordinate Rites and/or Rituals    [] Coordination with community clergy   [] No spiritual needs identified at this time   [] Detailed Plan of Care below (See Comments)  [] Make referral to Music Therapy  [] Make referral to Pet Therapy     [] Make referral to Addiction services  [] Make referral to [facility]  [] Make referral to Spiritual Care Partner  [] No future visits requested        [] Contact Spiritual Care for further referrals     Comments: Initial spiritual assessment attempted. Unable to engage with pt due to contact restrictions.  Pt appeared to be sleeping. Will attempt to reach family next. Contact Spiritual Care for any further referrals.   Ernestina Leal M.Div, [facility]   Paging Service [phone])

## 2021-12-27 NOTE — PROGRESS NOTES
Bedside and Verbal shift change report given to Zachariah Rodriguez RN (oncoming nurse) by Latesha Adhikari RN (offgoing nurse).  Report included the following information SBAR, Kardex, Intake/Output, MAR, Accordion, Recent Results, Med Rec Status and Cardiac Rhythm SR.

## 2021-12-27 NOTE — PROGRESS NOTES
Nephrology Progress Note  Blaise Leblanc     [email or web address]  Phone - [phone]   Patient: Ermelinda Landrum    YOB: 1975        Date- 12/27/2021   Admit Date: 12/26/2021  CC: Follow up for esrd          IMPRESSION & PLAN:   ·  esrd - home HD 5 days per week- Follows up with Dr Daren Mccauley at [facility]  · Covid 19 infection  · Hypotension  · Acute blood loss anemia   · H/o esophageal bleed from esophagitis  · Left arm swelling- s/p angioplasty of left subclavian vein  · Gram positive sepsis from dental abcess  · s/p lead extraction at [facility]  · Anemia of ckd  · Hx of renal tx in past times 2.  · Hypertension  · CAD  · Failed RTX times 2  · H/o DVT, s/p ivc filter/ h/o HIT    · Hyperkalemia    PLAN-   Plan for hemodialysis today   Use a 2K bath for hyperkalemia. We will also aim for at least 2.5 to 3 L of ultrafiltration if if able to be tolerated.  Further work-up for upper GI bleed ongoing   Plan for embolization by IR today to help with upper GI bleed   Will follow with you     Subjective: Interval History:   -Seen and examined in ICU through the glass door  -Discussed with critical care team      Objective:   Vitals:    12/27/21 0700 12/27/21 0800 12/27/21 0900 12/27/21 1000   BP: 112/66 107/62 111/68 112/69   Pulse: 77 81 78 77   Resp: 17 22 17 17   Temp:  98 °F (36.7 °C)     SpO2: 99% 100% 100% 100%   Weight:       Height:          12/26 0701 - 12/27 0700  In: 2166.3 [I.V.:1546.3]  Out: -   Last 3 Recorded Weights in this Encounter    12/26/21 0023   Weight: 61.2 kg (135 lb)      Physical exam:   GEN: NAD  NECK- Supple  RESP: no distress  NEURO:non focal  Patient examined through the glass door to minimize exposure to Covid and to preserve PPE      Chart reviewed. Pertinent Notes reviewed.      Data Review :  Recent Labs     12/27/21  0421 12/26/21  0111    136   K 6.3* 4.7    102   CO2 22 27   BUN 53* 32*   CREA 8.23* 7.27*   * 92 CA 7.4* 7.5*   MG 2.6*  --    PHOS 5.8*  --      Recent Labs     12/27/21  0421 12/26/21  1803 12/26/21  1352 12/26/21  0111 12/26/21  0111   WBC 7.3  --   --   --  12.9*   HGB 7.1* 7.2* 6.0*   < > 8.2*   HCT 22.3* 22.1* 19.0*   < > 26.4*     --   --   --  156    < > = values in this interval not displayed. No results for input(s): FE, TIBC, PSAT, FERR in the last 72 hours.    Medication list  reviewed  Current Facility-Administered Medications   Medication Dose Route Frequency    fentaNYL citrate (PF) injection 50 mcg  50 mcg IntraVENous Q4H PRN    sodium chloride (NS) flush 5-40 mL  5-40 mL IntraVENous Q8H    sodium chloride (NS) flush 5-40 mL  5-40 mL IntraVENous PRN    acetaminophen (TYLENOL) tablet 650 mg  650 mg Oral Q6H PRN    Or    acetaminophen (TYLENOL) suppository 650 mg  650 mg Rectal Q6H PRN    polyethylene glycol (MIRALAX) packet 17 g  17 g Oral DAILY PRN    ondansetron (ZOFRAN) injection 4 mg  4 mg IntraVENous Q6H PRN    octreotide (SANDOSTATIN) 500 mcg in 0.9% sodium chloride 500 mL infusion  50 mcg/hr IntraVENous CONTINUOUS    pantoprazole (PROTONIX) 40 mg in 0.9% sodium chloride 10 mL injection  40 mg IntraVENous Q12H    alcohol 62% (NOZIN) nasal  1 Ampule  1 Ampule Topical Q12H    NOREPINephrine (LEVOPHED) 8 mg in 5% dextrose 250mL (32 mcg/mL) infusion  0.5-16 mcg/min IntraVENous TITRATE    epoetin emilie-epbx (RETACRIT) injection 20,000 Units  20,000 Units SubCUTAneous Q MON, WED & 59 MD [facility]  [facility], [address]  [place], [address]  Phone - [phone]               Fax - [phone]

## 2021-12-27 NOTE — PROGRESS NOTES
GI Progress Note (for Rell Schofield)  NAME:Claude Celestia Brakeman :1975 NPO:[id number]   Prim GI: Isaias Boland MD  PCP: Caterina Mcnulty MD  Date/Time:  2021 10:15 AM   Assessment:   · Recurrent severe hematemesis  · Massive PRBC's transfusion since 21  · Anemia, post-hemorrhagic  · Has had now four EGD's most recently on  with bleeding GV and severe portal hypertensive gastropathy  · Severe pulmonary HTN  · +COVID  · ESRD on HD     Plan:   · I have reviewed case extensively as well as reviewed imaging with IR/Dr. Stephan Goetz and case. Pt is not a good candidate for TIPS given severe Pulmonary HTN. Given isolated GV which clearly has bleed multiple times BRTO appears to be best option. D/w Dr. Stephan Goetz who agrees both that not good candidate for TIPS and BRTO is best option for patient. Given Anesthesia availability will be done tomorrow as patient currently is stable without active bleeding  · Continue Octreotide  · Continue PPI     Subjective:   Pt w/ recurrent GI hemorrhagic overnight s/p emergent EGD as detailed above. No BM's since EGD    Complaint Y/N Description   Abdominal Pain     Hematemesis     Hematochezia     Melena     Constipation     Diarrhea     Dyspepsia     Dysphagia     Jaundiced     Nausea/vomiting       Review of Systems:  Symptom Y/N Comments  Symptom Y/N Comments   Fever/Chills    Chest Pain     Cough    Headaches     Sputum    Joint Pain     SOB/ZAMUDIO    Pruritis/Rash     Tolerating Diet    Other       Could NOT obtain due to:      Objective:   VITALS:   Last 24hrs VS reviewed since prior progress note. Most recent are:  Visit Vitals  /68   Pulse 78   Temp 98 °F (36.7 °C)   Resp 17   Ht 5' 7\" (1.702 m)   Wt 61.2 kg (135 lb)   SpO2 100%   BMI 21.14 kg/m²       Intake/Output Summary (Last 24 hours) at 2021 1015  Last data filed at 2021 0700  Gross per 24 hour   Intake 2166.3 ml   Output --   Net 2166.3 ml     PHYSICAL EXAM:  General: WD, WN.  Alert, cooperative, no acute distress    HEENT: NC, Atraumatic. PERRLA, EOMI. Anicteric sclerae. Lungs:  CTA Bilaterally. No Wheezing/Rhonchi/Rales. Heart:  Regular  rhythm,  No murmur (), No Rubs, No Gallops  Abdomen: Soft, Non distended, Non tender.  +Bowel sounds, no HSM  Extremities: No c/c/e  Neurologic:  No acute neurological distress     Lab and Radiology Data Reviewed: (see below)    Medications Reviewed: (see below)  PMH/SH reviewed - no change compared to H&P  ________________________________________________________________________  Care Plan discussed with:  Patient x   Family     RN x              Consultant:  anika Michelle MD     Procedures: see electronic medical records for all procedures/Xrays and details which were not copied into this note but were reviewed prior to creation of Plan. LABS:  Recent Labs     12/27/21  0421 12/26/21  1803 12/26/21  1352 12/26/21  0111   WBC 7.3  --   --  12.9*   HGB 7.1* 7.2*   < > 8.2*   HCT 22.3* 22.1*   < > 26.4*     --   --  156    < > = values in this interval not displayed. Recent Labs     12/27/21  0421 12/26/21  0111    136   K 6.3* 4.7    102   CO2 22 27   BUN 53* 32*   CREA 8.23* 7.27*   * 92   CA 7.4* 7.5*   MG 2.6*  --    PHOS 5.8*  --      Recent Labs     12/26/21  0111   *   TP 5.9*   ALB 1.9*   GLOB 4.0     Recent Labs     12/26/21  0212   INR 1.2*   PTP 12.8*   APTT 22.7      No results for input(s): FE, TIBC, PSAT, FERR in the last 72 hours. Lab Results   Component Value Date/Time    Folate 7.5 11/23/2011 11:26 AM     No results for input(s): PH, PCO2, PO2 in the last 72 hours. No results for input(s): CPK, CKMB in the last 72 hours.     No lab exists for component: TROPONINI  Lab Results   Component Value Date/Time    Color RED 11/16/2012 05:15 PM    Appearance OPAQUE 11/16/2012 05:15 PM    Specific gravity 1.020 11/16/2012 05:15 PM    Specific gravity 1.011 09/17/2012 01:35 AM    pH (UA) 8.0 11/16/2012 05:15 PM Protein >300 (A) 11/16/2012 05:15 PM    Glucose 100 (A) 11/16/2012 05:15 PM    Ketone NEGATIVE  11/16/2012 05:15 PM    Bilirubin NEGATIVE  09/17/2012 01:35 AM    Urobilinogen 0.2 11/16/2012 05:15 PM    Nitrites NEGATIVE  11/16/2012 05:15 PM    Leukocyte Esterase NEGATIVE  11/16/2012 05:15 PM    Epithelial cells 5-10 11/16/2012 05:15 PM    Bacteria 3+ (A) 11/16/2012 05:15 PM    WBC >100 (H) 11/16/2012 05:15 PM    RBC >100 (H) 11/16/2012 05:15 PM       MEDICATIONS:  Current Facility-Administered Medications   Medication Dose Route Frequency    sodium chloride (NS) flush 5-40 mL  5-40 mL IntraVENous Q8H    sodium chloride (NS) flush 5-40 mL  5-40 mL IntraVENous PRN    acetaminophen (TYLENOL) tablet 650 mg  650 mg Oral Q6H PRN    Or    acetaminophen (TYLENOL) suppository 650 mg  650 mg Rectal Q6H PRN    polyethylene glycol (MIRALAX) packet 17 g  17 g Oral DAILY PRN    ondansetron (ZOFRAN) injection 4 mg  4 mg IntraVENous Q6H PRN    octreotide (SANDOSTATIN) 500 mcg in 0.9% sodium chloride 500 mL infusion  50 mcg/hr IntraVENous CONTINUOUS    pantoprazole (PROTONIX) 40 mg in 0.9% sodium chloride 10 mL injection  40 mg IntraVENous Q12H    alcohol 62% (NOZIN) nasal  1 Ampule  1 Ampule Topical Q12H    NOREPINephrine (LEVOPHED) 8 mg in 5% dextrose 250mL (32 mcg/mL) infusion  0.5-16 mcg/min IntraVENous TITRATE    epoetin emilie-epbx (RETACRIT) injection 20,000 Units  20,000 Units SubCUTAneous Q MON, WED & FRI    fentaNYL citrate (PF) injection 50 mcg  50 mcg IntraVENous Q1H PRN

## 2021-12-27 NOTE — PROGRESS NOTES
Participated in [address] [place] [address] where patient was discussed.   Robert Quiñonez M.Div, [facility] Paging Service [phone]

## 2021-12-28 ENCOUNTER — ANESTHESIA (OUTPATIENT)
Dept: INTERVENTIONAL RADIOLOGY/VASCULAR | Age: 46
DRG: 270 | End: 2021-12-28
Payer: MEDICARE

## 2021-12-28 ENCOUNTER — ANESTHESIA EVENT (OUTPATIENT)
Dept: INTERVENTIONAL RADIOLOGY/VASCULAR | Age: 46
DRG: 270 | End: 2021-12-28
Payer: MEDICARE

## 2021-12-28 ENCOUNTER — HOSPITAL ENCOUNTER (OUTPATIENT)
Dept: INTERVENTIONAL RADIOLOGY/VASCULAR | Age: 46
Discharge: HOME | DRG: 270 | End: 2021-12-28
Attending: INTERNAL MEDICINE
Payer: MEDICARE

## 2021-12-28 VITALS
OXYGEN SATURATION: 98 % | DIASTOLIC BLOOD PRESSURE: 59 MMHG | SYSTOLIC BLOOD PRESSURE: 101 MMHG | TEMPERATURE: 97.6 F | HEART RATE: 87 BPM | RESPIRATION RATE: 12 BRPM

## 2021-12-28 LAB
ANION GAP SERPL CALC-SCNC: 7 MMOL/L (ref 5–15)
ANION GAP SERPL CALC-SCNC: 7 MMOL/L (ref 5–15)
ARTERIAL PATENCY WRIST A: POSITIVE
BASE EXCESS BLD CALC-SCNC: 1.7 MMOL/L
BDY SITE: ABNORMAL
BUN SERPL-MCNC: 29 MG/DL (ref 6–20)
BUN SERPL-MCNC: 37 MG/DL (ref 6–20)
BUN/CREAT SERPL: 6 (ref 12–20)
BUN/CREAT SERPL: 6 (ref 12–20)
CALCIUM SERPL-MCNC: 7.3 MG/DL (ref 8.5–10.1)
CALCIUM SERPL-MCNC: 7.4 MG/DL (ref 8.5–10.1)
CHLORIDE SERPL-SCNC: 101 MMOL/L (ref 97–108)
CHLORIDE SERPL-SCNC: 101 MMOL/L (ref 97–108)
CO2 SERPL-SCNC: 29 MMOL/L (ref 21–32)
CO2 SERPL-SCNC: 30 MMOL/L (ref 21–32)
CREAT SERPL-MCNC: 5.15 MG/DL (ref 0.7–1.3)
CREAT SERPL-MCNC: 5.81 MG/DL (ref 0.7–1.3)
ERYTHROCYTE [DISTWIDTH] IN BLOOD BY AUTOMATED COUNT: 18.6 % (ref 11.5–14.5)
ERYTHROCYTE [DISTWIDTH] IN BLOOD BY AUTOMATED COUNT: 19.1 % (ref 11.5–14.5)
GAS FLOW.O2 O2 DELIVERY SYS: ABNORMAL L/MIN
GAS FLOW.O2 SETTING OXYMISER: 16 BPM
GLUCOSE SERPL-MCNC: 108 MG/DL (ref 65–100)
GLUCOSE SERPL-MCNC: 148 MG/DL (ref 65–100)
HCO3 BLD-SCNC: 27.9 MMOL/L (ref 22–26)
HCT VFR BLD AUTO: 20.8 % (ref 36.6–50.3)
HCT VFR BLD AUTO: 27 % (ref 36.6–50.3)
HCT VFR BLD AUTO: 30.3 % (ref 36.6–50.3)
HGB BLD-MCNC: 6.6 G/DL (ref 12.1–17)
HGB BLD-MCNC: 8.5 G/DL (ref 12.1–17)
HGB BLD-MCNC: 9.5 G/DL (ref 12.1–17)
HISTORY CHECKED?,CKHIST: NORMAL
MAGNESIUM SERPL-MCNC: 2.2 MG/DL (ref 1.6–2.4)
MAGNESIUM SERPL-MCNC: 2.2 MG/DL (ref 1.6–2.4)
MCH RBC QN AUTO: 30.4 PG (ref 26–34)
MCH RBC QN AUTO: 30.6 PG (ref 26–34)
MCHC RBC AUTO-ENTMCNC: 31.4 G/DL (ref 30–36.5)
MCHC RBC AUTO-ENTMCNC: 31.7 G/DL (ref 30–36.5)
MCV RBC AUTO: 95.9 FL (ref 80–99)
MCV RBC AUTO: 97.7 FL (ref 80–99)
NRBC # BLD: 0 K/UL (ref 0–0.01)
NRBC # BLD: 0.02 K/UL (ref 0–0.01)
NRBC BLD-RTO: 0 PER 100 WBC
NRBC BLD-RTO: 0.1 PER 100 WBC
O2/TOTAL GAS SETTING VFR VENT: 60 %
PCO2 BLD: 50.4 MMHG (ref 35–45)
PEEP RESPIRATORY: 5 CMH2O
PH BLD: 7.35 [PH] (ref 7.35–7.45)
PHOSPHATE SERPL-MCNC: 4.2 MG/DL (ref 2.6–4.7)
PHOSPHATE SERPL-MCNC: 6.2 MG/DL (ref 2.6–4.7)
PLATELET # BLD AUTO: 160 K/UL (ref 150–400)
PLATELET # BLD AUTO: 170 K/UL (ref 150–400)
PMV BLD AUTO: 10.6 FL (ref 8.9–12.9)
PMV BLD AUTO: 10.9 FL (ref 8.9–12.9)
PO2 BLD: 188 MMHG (ref 80–100)
POTASSIUM SERPL-SCNC: 4.5 MMOL/L (ref 3.5–5.1)
POTASSIUM SERPL-SCNC: 5.7 MMOL/L (ref 3.5–5.1)
RBC # BLD AUTO: 2.17 M/UL (ref 4.1–5.7)
RBC # BLD AUTO: 3.1 M/UL (ref 4.1–5.7)
SAO2 % BLD: 99.6 % (ref 92–97)
SODIUM SERPL-SCNC: 137 MMOL/L (ref 136–145)
SODIUM SERPL-SCNC: 138 MMOL/L (ref 136–145)
SPECIMEN TYPE: ABNORMAL
VENTILATION MODE VENT: ABNORMAL
VT SETTING VENT: 350 ML
WBC # BLD AUTO: 17.7 K/UL (ref 4.1–11.1)
WBC # BLD AUTO: 8.4 K/UL (ref 4.1–11.1)

## 2021-12-28 PROCEDURE — C1887 CATHETER, GUIDING: HCPCS

## 2021-12-28 PROCEDURE — C1892 INTRO/SHEATH,FIXED,PEEL-AWAY: HCPCS

## 2021-12-28 PROCEDURE — 36430 TRANSFUSION BLD/BLD COMPNT: CPT

## 2021-12-28 PROCEDURE — C1894 INTRO/SHEATH, NON-LASER: HCPCS

## 2021-12-28 PROCEDURE — 77030019698 HC SYR ANGI MDLON MRTM -A

## 2021-12-28 PROCEDURE — 77010033678 HC OXYGEN DAILY

## 2021-12-28 PROCEDURE — 74011250636 HC RX REV CODE- 250/636: Performed by: NURSE ANESTHETIST, CERTIFIED REGISTERED

## 2021-12-28 PROCEDURE — C1769 GUIDE WIRE: HCPCS

## 2021-12-28 PROCEDURE — 86644 CMV ANTIBODY: CPT

## 2021-12-28 PROCEDURE — 84100 ASSAY OF PHOSPHORUS: CPT

## 2021-12-28 PROCEDURE — 80048 BASIC METABOLIC PNL TOTAL CA: CPT

## 2021-12-28 PROCEDURE — 2709999900 HC NON-CHARGEABLE SUPPLY

## 2021-12-28 PROCEDURE — 74011000250 HC RX REV CODE- 250: Performed by: INTERNAL MEDICINE

## 2021-12-28 PROCEDURE — 74011636637 HC RX REV CODE- 636/637: Performed by: NURSE PRACTITIONER

## 2021-12-28 PROCEDURE — 82803 BLOOD GASES ANY COMBINATION: CPT

## 2021-12-28 PROCEDURE — 94002 VENT MGMT INPAT INIT DAY: CPT

## 2021-12-28 PROCEDURE — 74011250636 HC RX REV CODE- 250/636: Performed by: INTERNAL MEDICINE

## 2021-12-28 PROCEDURE — 75889 VEIN X-RAY LIVER W/HEMODYNAM: CPT

## 2021-12-28 PROCEDURE — 85018 HEMOGLOBIN: CPT

## 2021-12-28 PROCEDURE — 74011250637 HC RX REV CODE- 250/637: Performed by: NURSE PRACTITIONER

## 2021-12-28 PROCEDURE — 93005 ELECTROCARDIOGRAM TRACING: CPT

## 2021-12-28 PROCEDURE — 74011636637 HC RX REV CODE- 636/637: Performed by: STUDENT IN AN ORGANIZED HEALTH CARE EDUCATION/TRAINING PROGRAM

## 2021-12-28 PROCEDURE — 77030009378 HC DEV TORQ OLCT F/GWIRE MANIP COOK -A

## 2021-12-28 PROCEDURE — 77030004566 HC CATH ANGI DX TORCON COOK -B

## 2021-12-28 PROCEDURE — P9016 RBC LEUKOCYTES REDUCED: HCPCS

## 2021-12-28 PROCEDURE — 77030021678 HC GLIDESCP STAT DISP VERT -B: Performed by: ANESTHESIOLOGY

## 2021-12-28 PROCEDURE — 74011000250 HC RX REV CODE- 250: Performed by: NURSE ANESTHETIST, CERTIFIED REGISTERED

## 2021-12-28 PROCEDURE — 77030011229 HC DIL VESL COON COOK -A

## 2021-12-28 PROCEDURE — 36415 COLL VENOUS BLD VENIPUNCTURE: CPT

## 2021-12-28 PROCEDURE — 74011000636 HC RX REV CODE- 636: Performed by: STUDENT IN AN ORGANIZED HEALTH CARE EDUCATION/TRAINING PROGRAM

## 2021-12-28 PROCEDURE — 77030014109 HC TRNSDUC SP PROC MRTM -B

## 2021-12-28 PROCEDURE — B51K1ZZ FLUOROSCOPY OF LEFT RENAL VEIN USING LOW OSMOLAR CONTRAST: ICD-10-PCS | Performed by: STUDENT IN AN ORGANIZED HEALTH CARE EDUCATION/TRAINING PROGRAM

## 2021-12-28 PROCEDURE — 74011000258 HC RX REV CODE- 258: Performed by: INTERNAL MEDICINE

## 2021-12-28 PROCEDURE — 77030008684 HC TU ET CUF COVD -B: Performed by: ANESTHESIOLOGY

## 2021-12-28 PROCEDURE — 74011000250 HC RX REV CODE- 250: Performed by: NURSE PRACTITIONER

## 2021-12-28 PROCEDURE — C9113 INJ PANTOPRAZOLE SODIUM, VIA: HCPCS | Performed by: NURSE PRACTITIONER

## 2021-12-28 PROCEDURE — C1725 CATH, TRANSLUMIN NON-LASER: HCPCS

## 2021-12-28 PROCEDURE — 94003 VENT MGMT INPAT SUBQ DAY: CPT

## 2021-12-28 PROCEDURE — 85027 COMPLETE CBC AUTOMATED: CPT

## 2021-12-28 PROCEDURE — 83735 ASSAY OF MAGNESIUM: CPT

## 2021-12-28 PROCEDURE — 74011250636 HC RX REV CODE- 250/636: Performed by: NURSE PRACTITIONER

## 2021-12-28 PROCEDURE — 74011000258 HC RX REV CODE- 258: Performed by: STUDENT IN AN ORGANIZED HEALTH CARE EDUCATION/TRAINING PROGRAM

## 2021-12-28 PROCEDURE — 77030014021 HC SYR ANGI FIX LOK MRTM -A

## 2021-12-28 PROCEDURE — 36600 WITHDRAWAL OF ARTERIAL BLOOD: CPT

## 2021-12-28 PROCEDURE — 90935 HEMODIALYSIS ONE EVALUATION: CPT

## 2021-12-28 PROCEDURE — 74011250636 HC RX REV CODE- 250/636: Performed by: STUDENT IN AN ORGANIZED HEALTH CARE EDUCATION/TRAINING PROGRAM

## 2021-12-28 PROCEDURE — 74011250637 HC RX REV CODE- 250/637: Performed by: INTERNAL MEDICINE

## 2021-12-28 PROCEDURE — 76060000037 HC ANESTHESIA 3 TO 3.5 HR

## 2021-12-28 PROCEDURE — 65610000006 HC RM INTENSIVE CARE

## 2021-12-28 RX ORDER — HYDROMORPHONE HYDROCHLORIDE 2 MG/ML
.2-.5 INJECTION, SOLUTION INTRAMUSCULAR; INTRAVENOUS; SUBCUTANEOUS
Status: CANCELLED | OUTPATIENT
Start: 2021-12-28

## 2021-12-28 RX ORDER — BALSAM PERU/CASTOR OIL
OINTMENT (GRAM) TOPICAL 2 TIMES DAILY
Status: DISCONTINUED | OUTPATIENT
Start: 2021-12-28 | End: 2022-01-18 | Stop reason: HOSPADM

## 2021-12-28 RX ORDER — SODIUM CHLORIDE, SODIUM LACTATE, POTASSIUM CHLORIDE, CALCIUM CHLORIDE 600; 310; 30; 20 MG/100ML; MG/100ML; MG/100ML; MG/100ML
25 INJECTION, SOLUTION INTRAVENOUS CONTINUOUS
Status: CANCELLED | OUTPATIENT
Start: 2021-12-28

## 2021-12-28 RX ORDER — LIDOCAINE HYDROCHLORIDE 20 MG/ML
INJECTION, SOLUTION EPIDURAL; INFILTRATION; INTRACAUDAL; PERINEURAL AS NEEDED
Status: DISCONTINUED | OUTPATIENT
Start: 2021-12-28 | End: 2021-12-28 | Stop reason: HOSPADM

## 2021-12-28 RX ORDER — PROPOFOL 10 MG/ML
INJECTION, EMULSION INTRAVENOUS AS NEEDED
Status: DISCONTINUED | OUTPATIENT
Start: 2021-12-28 | End: 2021-12-28 | Stop reason: HOSPADM

## 2021-12-28 RX ORDER — FENTANYL CITRATE 50 UG/ML
25 INJECTION, SOLUTION INTRAMUSCULAR; INTRAVENOUS
Status: CANCELLED | OUTPATIENT
Start: 2021-12-28

## 2021-12-28 RX ORDER — LIDOCAINE HYDROCHLORIDE 10 MG/ML
0.1 INJECTION, SOLUTION EPIDURAL; INFILTRATION; INTRACAUDAL; PERINEURAL AS NEEDED
Status: CANCELLED | OUTPATIENT
Start: 2021-12-28

## 2021-12-28 RX ORDER — OXYCODONE AND ACETAMINOPHEN 5; 325 MG/1; MG/1
1 TABLET ORAL
COMMUNITY
Start: 2021-09-16 | End: 2022-01-17

## 2021-12-28 RX ORDER — SODIUM CHLORIDE 9 MG/ML
INJECTION, SOLUTION INTRAVENOUS
Status: DISCONTINUED | OUTPATIENT
Start: 2021-12-28 | End: 2021-12-28 | Stop reason: HOSPADM

## 2021-12-28 RX ORDER — SUCCINYLCHOLINE CHLORIDE 20 MG/ML
INJECTION INTRAMUSCULAR; INTRAVENOUS AS NEEDED
Status: DISCONTINUED | OUTPATIENT
Start: 2021-12-28 | End: 2021-12-28 | Stop reason: HOSPADM

## 2021-12-28 RX ORDER — PHENYLEPHRINE HCL IN 0.9% NACL 0.4MG/10ML
SYRINGE (ML) INTRAVENOUS
Status: DISCONTINUED | OUTPATIENT
Start: 2021-12-28 | End: 2021-12-28 | Stop reason: HOSPADM

## 2021-12-28 RX ORDER — LIDOCAINE HYDROCHLORIDE 20 MG/ML
18 INJECTION, SOLUTION INFILTRATION; PERINEURAL ONCE
Status: DISCONTINUED | OUTPATIENT
Start: 2021-12-28 | End: 2021-12-29 | Stop reason: HOSPADM

## 2021-12-28 RX ORDER — SODIUM CHLORIDE 9 MG/ML
250 INJECTION, SOLUTION INTRAVENOUS AS NEEDED
Status: DISCONTINUED | OUTPATIENT
Start: 2021-12-28 | End: 2021-12-31

## 2021-12-28 RX ORDER — DEXMEDETOMIDINE HYDROCHLORIDE 4 UG/ML
.1-1.5 INJECTION, SOLUTION INTRAVENOUS
Status: DISCONTINUED | OUTPATIENT
Start: 2021-12-28 | End: 2021-12-28

## 2021-12-28 RX ORDER — FENTANYL CITRATE 50 UG/ML
50 INJECTION, SOLUTION INTRAMUSCULAR; INTRAVENOUS AS NEEDED
Status: CANCELLED | OUTPATIENT
Start: 2021-12-28

## 2021-12-28 RX ORDER — ERGOCALCIFEROL 1.25 MG/1
50000 CAPSULE ORAL
COMMUNITY
Start: 2021-10-15

## 2021-12-28 RX ORDER — MIDAZOLAM HYDROCHLORIDE 1 MG/ML
INJECTION, SOLUTION INTRAMUSCULAR; INTRAVENOUS AS NEEDED
Status: DISCONTINUED | OUTPATIENT
Start: 2021-12-28 | End: 2021-12-28 | Stop reason: HOSPADM

## 2021-12-28 RX ORDER — ONDANSETRON 2 MG/ML
INJECTION INTRAMUSCULAR; INTRAVENOUS AS NEEDED
Status: DISCONTINUED | OUTPATIENT
Start: 2021-12-28 | End: 2021-12-28 | Stop reason: HOSPADM

## 2021-12-28 RX ORDER — GLYCOPYRROLATE 0.2 MG/ML
INJECTION INTRAMUSCULAR; INTRAVENOUS AS NEEDED
Status: DISCONTINUED | OUTPATIENT
Start: 2021-12-28 | End: 2021-12-28 | Stop reason: HOSPADM

## 2021-12-28 RX ORDER — NEOSTIGMINE METHYLSULFATE 1 MG/ML
INJECTION, SOLUTION INTRAVENOUS AS NEEDED
Status: DISCONTINUED | OUTPATIENT
Start: 2021-12-28 | End: 2021-12-28 | Stop reason: HOSPADM

## 2021-12-28 RX ORDER — FENTANYL CITRATE 50 UG/ML
INJECTION, SOLUTION INTRAMUSCULAR; INTRAVENOUS AS NEEDED
Status: DISCONTINUED | OUTPATIENT
Start: 2021-12-28 | End: 2021-12-28 | Stop reason: HOSPADM

## 2021-12-28 RX ORDER — EPHEDRINE SULFATE/0.9% NACL/PF 50 MG/5 ML
SYRINGE (ML) INTRAVENOUS AS NEEDED
Status: DISCONTINUED | OUTPATIENT
Start: 2021-12-28 | End: 2021-12-28 | Stop reason: HOSPADM

## 2021-12-28 RX ORDER — PROPRANOLOL HYDROCHLORIDE 10 MG/1
10 TABLET ORAL 2 TIMES DAILY
COMMUNITY
Start: 2021-12-24 | End: 2022-09-25

## 2021-12-28 RX ORDER — SODIUM CHLORIDE, SODIUM LACTATE, POTASSIUM CHLORIDE, CALCIUM CHLORIDE 600; 310; 30; 20 MG/100ML; MG/100ML; MG/100ML; MG/100ML
25 INJECTION, SOLUTION INTRAVENOUS CONTINUOUS
Status: CANCELLED | OUTPATIENT
Start: 2021-12-28 | End: 2021-12-29

## 2021-12-28 RX ORDER — SODIUM TETRADECYL SULFATE 30 MG/ML
8 INJECTION, SOLUTION INTRAVENOUS ONCE
Status: DISPENSED | OUTPATIENT
Start: 2021-12-28 | End: 2021-12-29

## 2021-12-28 RX ORDER — CHLORHEXIDINE GLUCONATE 1.2 MG/ML
15 RINSE ORAL EVERY 12 HOURS
Status: DISCONTINUED | OUTPATIENT
Start: 2021-12-28 | End: 2021-12-30 | Stop reason: ALTCHOICE

## 2021-12-28 RX ORDER — MIDAZOLAM HYDROCHLORIDE 1 MG/ML
1 INJECTION, SOLUTION INTRAMUSCULAR; INTRAVENOUS AS NEEDED
Status: CANCELLED | OUTPATIENT
Start: 2021-12-28

## 2021-12-28 RX ORDER — ROCURONIUM BROMIDE 10 MG/ML
INJECTION, SOLUTION INTRAVENOUS AS NEEDED
Status: DISCONTINUED | OUTPATIENT
Start: 2021-12-28 | End: 2021-12-28 | Stop reason: HOSPADM

## 2021-12-28 RX ADMIN — NOREPINEPHRINE BITARTRATE 2 MCG/MIN: 1 INJECTION, SOLUTION, CONCENTRATE INTRAVENOUS at 18:58

## 2021-12-28 RX ADMIN — MIDAZOLAM 2 MG: 1 INJECTION INTRAMUSCULAR; INTRAVENOUS at 13:25

## 2021-12-28 RX ADMIN — SODIUM CHLORIDE: 0.9 INJECTION, SOLUTION INTRAVENOUS at 13:25

## 2021-12-28 RX ADMIN — SODIUM CHLORIDE 0.3 MCG/KG/HR: 9 INJECTION, SOLUTION INTRAVENOUS at 17:27

## 2021-12-28 RX ADMIN — FENTANYL CITRATE 50 MCG: 50 INJECTION, SOLUTION INTRAMUSCULAR; INTRAVENOUS at 15:25

## 2021-12-28 RX ADMIN — SODIUM CHLORIDE 40 MG: 9 INJECTION, SOLUTION INTRAMUSCULAR; INTRAVENOUS; SUBCUTANEOUS at 21:59

## 2021-12-28 RX ADMIN — Medication 1 AMPULE: at 20:58

## 2021-12-28 RX ADMIN — FENTANYL CITRATE 50 MCG: 0.05 INJECTION, SOLUTION INTRAMUSCULAR; INTRAVENOUS at 10:00

## 2021-12-28 RX ADMIN — PREDNISONE 50 MG: 20 TABLET ORAL at 12:04

## 2021-12-28 RX ADMIN — SODIUM CHLORIDE, PRESERVATIVE FREE 40 ML: 5 INJECTION INTRAVENOUS at 05:58

## 2021-12-28 RX ADMIN — OCTREOTIDE ACETATE 50 MCG/HR: 500 INJECTION, SOLUTION INTRAVENOUS; SUBCUTANEOUS at 12:31

## 2021-12-28 RX ADMIN — PIPERACILLIN AND TAZOBACTAM 3.38 G: 3; .375 INJECTION, POWDER, LYOPHILIZED, FOR SOLUTION INTRAVENOUS at 15:20

## 2021-12-28 RX ADMIN — Medication 3 MG: at 15:11

## 2021-12-28 RX ADMIN — ROCURONIUM BROMIDE 10 MG: 10 INJECTION INTRAVENOUS at 13:35

## 2021-12-28 RX ADMIN — SUGAMMADEX 200 MG: 100 INJECTION, SOLUTION INTRAVENOUS at 15:57

## 2021-12-28 RX ADMIN — Medication 40 MCG/MIN: at 13:42

## 2021-12-28 RX ADMIN — PROPOFOL 120 MG: 10 INJECTION, EMULSION INTRAVENOUS at 16:00

## 2021-12-28 RX ADMIN — FENTANYL CITRATE 50 MCG: 50 INJECTION, SOLUTION INTRAMUSCULAR; INTRAVENOUS at 13:35

## 2021-12-28 RX ADMIN — Medication 10 MG: at 14:12

## 2021-12-28 RX ADMIN — SUCCINYLCHOLINE CHLORIDE 140 MG: 20 INJECTION, SOLUTION INTRAMUSCULAR; INTRAVENOUS at 13:35

## 2021-12-28 RX ADMIN — PREDNISONE 50 MG: 20 TABLET ORAL at 06:01

## 2021-12-28 RX ADMIN — FENTANYL CITRATE 50 MCG: 0.05 INJECTION, SOLUTION INTRAMUSCULAR; INTRAVENOUS at 16:56

## 2021-12-28 RX ADMIN — ROCURONIUM BROMIDE 20 MG: 10 INJECTION INTRAVENOUS at 13:45

## 2021-12-28 RX ADMIN — LIDOCAINE HYDROCHLORIDE 80 MG: 20 INJECTION, SOLUTION INTRAVENOUS at 13:35

## 2021-12-28 RX ADMIN — SODIUM CHLORIDE, PRESERVATIVE FREE 40 ML: 5 INJECTION INTRAVENOUS at 22:52

## 2021-12-28 RX ADMIN — FENTANYL CITRATE 50 MCG: 0.05 INJECTION, SOLUTION INTRAMUSCULAR; INTRAVENOUS at 05:59

## 2021-12-28 RX ADMIN — FENTANYL CITRATE 50 MCG: 0.05 INJECTION, SOLUTION INTRAMUSCULAR; INTRAVENOUS at 21:05

## 2021-12-28 RX ADMIN — Medication: at 19:47

## 2021-12-28 RX ADMIN — PROPOFOL 150 MG: 10 INJECTION, EMULSION INTRAVENOUS at 13:35

## 2021-12-28 RX ADMIN — FENTANYL CITRATE 50 MCG: 0.05 INJECTION, SOLUTION INTRAMUSCULAR; INTRAVENOUS at 01:15

## 2021-12-28 RX ADMIN — CHLORHEXIDINE GLUCONATE 15 ML: 1.2 RINSE ORAL at 21:07

## 2021-12-28 RX ADMIN — SODIUM CHLORIDE 40 MG: 9 INJECTION, SOLUTION INTRAMUSCULAR; INTRAVENOUS; SUBCUTANEOUS at 08:53

## 2021-12-28 RX ADMIN — GLYCOPYRROLATE 0.4 MG: 0.2 INJECTION INTRAMUSCULAR; INTRAVENOUS at 15:11

## 2021-12-28 RX ADMIN — FENTANYL CITRATE 50 MCG/HR: 50 INJECTION INTRAVENOUS at 23:57

## 2021-12-28 RX ADMIN — ONDANSETRON HYDROCHLORIDE 4 MG: 2 INJECTION, SOLUTION INTRAMUSCULAR; INTRAVENOUS at 15:11

## 2021-12-28 RX ADMIN — Medication 1 AMPULE: at 08:53

## 2021-12-28 NOTE — PROGRESS NOTES
0800 - Patient  A & O x 4. On levophed at 1 mcg, complaining of 7/10 pain. Melena bm noted. Titrating levophed to achieve MAP > 65.    0900 - 1000 GI to speak with patient about going to IR tomorrow to treatment of abdominal clots. Patient expressed understanding. Discussed pain management with Dr. Ellen Nicole. 1200 - Reassessment complete and documented. See flow sheets for details. Spoke with Lizette An 1154 RN who said she would f/u with nephrologist about dialysis today. Clear liquid diet obtained from GI for patient and patient will be NPO after midnight. Order for percocet obtained from Dr. Ellen Nicole.    1600 - Reassessment complete and documented. See flow sheets for details. PRN fent admin for abdominal pain. 1830 - [facility] at bedside to start HD. End of Shift Note    Bedside shift change report given to Zuleyma Arias (oncoming nurse) by Gloria Willis RN (offgoing nurse). Report included the following information SBAR, Kardex, Intake/Output, MAR, Recent Results and Med Rec Status    Shift worked:  7a-7p     Shift summary and any significant changes:     none     Concerns for physician to address:  none     Zone phone for oncoming shift:   n/a       Activity:  Activity Level: Bed Rest  Number times ambulated in hallways past shift: 0  Number of times OOB to chair past shift: 0    Cardiac:   Cardiac Monitoring: Yes      Cardiac Rhythm: Sinus Rhythm,BBB    Access:   Current line(s): central line     Genitourinary:   Urinary status: anuric    Respiratory:   O2 Device: None (Room air)  Chronic home O2 use?: NO  Incentive spirometer at bedside: NO     GI:  Last Bowel Movement Date: 12/27/21  Current diet:  ADULT DIET Clear Liquid; Low Potassium (Less than 3000 mg/day);  Low Phosphorus (Less than 1000 mg)  DIET NPO  Passing flatus: YES  Tolerating current diet: YES       Pain Management:   Patient states pain is manageable on current regimen: NO    Skin:  Yong Score: 14  Interventions: float heels    Patient Safety:  Fall Score:  Total Score: 3  Interventions: bed/chair alarm  High Fall Risk: Yes    Length of Stay:  Expected LOS: - - -  Actual LOS: MANUELITO Potter

## 2021-12-28 NOTE — PROGRESS NOTES
[id number] Bedside shift change report received from Fernie Segura RN (offgoing nurse). Report included the following information SBAR, Kardex, ED Summary, Procedure Summary, Intake/Output, MAR and Recent Results. Dialysis at bedside - problem with machine so delayed start. 2000 Shift assessment completed, see flowsheets. Patient stable on room air. Patient admits to setting a timer to call for pain medicine doses. 2034 PRN pain medication administered. 0000 Reassessment completed, see flowsheets. Dialysis completed -  Ran for 3.5 hours and pulled off 2L. Patient tolerated well. 0115 PRN pain medication administered. 0400 Reassessment completed, see flowsheets. 0558 PRN pain medication administered. 0600 Central line dressing change. 0800 Verbal shift change report given to Tay Castano RN (oncoming nurse) by MANUELITO Olivas (offgoing nurse). Report included the following information SBAR, Kardex, ED Summary, Procedure Summary, Intake/Output, MAR and Recent Results.

## 2021-12-28 NOTE — PROCEDURES
Hemodialysis / [phone]    Vitals Pre Post Assessment Pre Post   /73  LOC A&O x4    HR 69  Lungs No SOB    Resp 18  Cardiac regular    Temp 98.2  Skin Dry, warm    Weight    Edema Left Arm    Tele status Monitored at the bedside  Pain Medicated by primary RN      Orders   Duration: Start: 2015 End:  Total:    Dialyzer: Dialyzer/Set Up Inspection: Revaclear (12/27/21 2015)   K Bath: Dialysate K (mEq/L): 2 (12/27/21 2015)   Ca Bath: Dialysate CA (mEq/L): 3.0 (12/27/21 2015)   Na: Dialysate NA (mEq/L): 140 (12/27/21 2015)   Bicarb: Dialysate HCO3 (mEq/L): 40 (12/27/21 2015)   Target Fluid Removal: Goal/Amount of Fluid to Remove (mL): 1000 mL (12/27/21 2015)     Access   Type & Location: SUZANNE AVG: +B&T, no S&S of infection, site cleaned per P&P, cannulated with 15 G 1\" needles x2   Comments:                                        Labs   HBsAg (Antigen) / date:  12/01/21 negative                                             HBsAb (Antibody) / date: 11/11/21 immune   Source:    Obtained/Reviewed  Critical Results Called HGB   Date Value Ref Range Status   12/27/2021 7.1 (L) 12.1 - 17.0 g/dL Final     Potassium   Date Value Ref Range Status   12/27/2021 6.3 (H) 3.5 - 5.1 mmol/L Final     Comment:     NO VISIBLE HEMOLYSIS  INVESTIGATED PER DELTA CHECK PROTOCOL       Calcium   Date Value Ref Range Status   12/27/2021 7.4 (L) 8.5 - 10.1 MG/DL Final     BUN   Date Value Ref Range Status   12/27/2021 53 (H) 6 - 20 MG/DL Final     Comment:     INVESTIGATED PER DELTA CHECK PROTOCOL     Creatinine   Date Value Ref Range Status   12/27/2021 8.23 (H) 0.70 - 1.30 MG/DL Final        Meds Given   Name Dose Route                    Adequacy / Fluid    Total Liters Process:    Net Fluid Removed:       Comments   Time Out Done:   (Time) 2010   Admitting Diagnosis:    Consent obtained/signed: Informed Consent Verified: Yes (12/27/21 2015)   Machine / RO # Machine Number: Cedrick Leaks (12/27/21 2015)   Primary Nurse Rpt Pre: Alberta Swanson El Herrmann, RN   Primary Nurse Rpt Post:    Pt Education: Access care, procedure   Care Plan: Continue HD tx as per MD order   Pts outpatient clinic:      Tx Summary   Comments:             2015 HD tx started without any issues.                                  2130 running well

## 2021-12-28 NOTE — WOUND CARE
Wound care orders were reinstated for this patient for the ICU admission. Patient was just seen 4 days ago for the same wounds. Float the heels always for this patient.    Kun Leung RN, BSN, [employer]

## 2021-12-28 NOTE — PROGRESS NOTES
TRANSFER - OUT REPORT:    Verbal report given to Wilbur BILLINGS(name) on Harsha Birch  being transferred to CCU(unit) for routine progression of care       Report consisted of patients Situation, Background, Assessment and   Recommendations(SBAR). Information from the following report(s) Procedure Summary was reviewed with the receiving nurse. Lines:   Quad Lumen 12/26/21 Proximal;Right Femoral (Active)   Central Line Being Utilized Yes 12/28/21 0600   Criteria for Appropriate Use Hemodynamically unstable, requiring monitoring lines, vasopressors, or volume resuscitation 12/28/21 0600   Site Assessment Clean, dry, & intact 12/28/21 0600   Infiltration Assessment 0 12/28/21 0600   Affected Extremity/Extremities Color distal to insertion site pink (or appropriate for race) 12/28/21 0600   Date of Last Dressing Change 12/28/21 12/28/21 0600   Dressing Status New 12/28/21 0600   Dressing Type Disk with Chlorhexadine gluconate (CHG) 12/28/21 0600   Action Taken Dressing changed; Open ports on tubing capped 12/28/21 0600   Proximal Hub Color/Line Status White;Flushed;Cap end changed; Capped 12/28/21 0600   Positive Blood Return (Medial Site) Yes 12/28/21 0600   Medial 1 Hub Color/Line Status Gray;Cap end changed; Flushed;Capped 12/28/21 0600   Positive Blood Return (Lateral Site) Yes 12/28/21 0600   Medial 2 Hub Color/Line Status Blue; Infusing 12/28/21 0600   Positive Blood Return (Site #3) Yes 12/28/21 0600   Distal Hub Color/Line Status Brown;Flushed;Capped 12/28/21 0600   Positive Blood Return (Site #4) Yes 12/28/21 0600   Alcohol Cap Used Yes 12/28/21 0600        Opportunity for questions and clarification was provided.       Patient transported with:   Monitor  O2 @ Ambu Bag liters  Registered Nurse

## 2021-12-28 NOTE — DIALYSIS
Hemodialysis / [phone]    Vitals Post Assessment Post   /64 LOC A&O x4, appropriate   HR 76 Lungs No SOB or distress, room air   Resp 15 Cardiac Regular, rate WNL   Temp 97.6 Skin Warm, dry   Weight 63.8 kg Edema None   Tele status Bedside monitoring  Pain 0/10     Orders   Duration: Start: 2015 End: 2345 Total: 3.5 hrs   Dialyzer: Dialyzer/Set Up Inspection: Revaclear (12/27/21 2015)   K Bath: Dialysate K (mEq/L): 2 (12/27/21 2015)   Ca Bath: Dialysate CA (mEq/L): 3.0 (12/27/21 2015)   Na: Dialysate NA (mEq/L): 140 (12/27/21 2015)   Bicarb: Dialysate HCO3 (mEq/L): 40 (12/27/21 2015)   Target Fluid Removal: Goal/Amount of Fluid to Remove (mL): 1000 mL (12/27/21 2015)   Per MD note - \"aim for 2.5-3 L of UF\"     Access   Type & Location: SUZANNE AVG   Comments: Removed x2 15G needles, tips intact and hand held pressure applied until hemostasis achieved. Dressing C/D/I on departure. +bruit/+thrill.                                        Labs   HBsAg (Antigen) / date: Negative 12/1/21                                              HBsAb (Antibody) / date: Immune 11/11/21   Source: Epic   Obtained/Reviewed  Critical Results Called HGB   Date Value Ref Range Status   12/27/2021 7.1 (L) 12.1 - 17.0 g/dL Final     Potassium   Date Value Ref Range Status   12/27/2021 6.3 (H) 3.5 - 5.1 mmol/L Final     Comment:     NO VISIBLE HEMOLYSIS  INVESTIGATED PER DELTA CHECK PROTOCOL       Calcium   Date Value Ref Range Status   12/27/2021 7.4 (L) 8.5 - 10.1 MG/DL Final     BUN   Date Value Ref Range Status   12/27/2021 53 (H) 6 - 20 MG/DL Final     Comment:     INVESTIGATED PER DELTA CHECK PROTOCOL     Creatinine   Date Value Ref Range Status   12/27/2021 8.23 (H) 0.70 - 1.30 MG/DL Final        Meds Given - NONE     Adequacy / Fluid    Total Liters Process: 78 L   Net Fluid Removed: 2,000 ml      Comments   Time Out Done:    Handoff of care from Silvana Encarnacion RN 1/27/21 @ [phone]   Consent obtained/signed: Informed Consent Verified: Yes (12/27/21 2015)   Machine / RO # Machine Number: Amrit Torres (12/27/21 2015)   Primary Nurse Rpt Pre: Velia Espinal RN   Primary Nurse Rpt PostDoc MANUELITO Vargas   Pt Education: Fluids status   Care Plan: See Nephrology Notes     Tx Summary   Comments:      8722: Bedside report from [address] [facility] [address] - took over remainder of treatment. Pt alert, VSS, on bedside monitoring - no voiced pain or s/sx of distress. Resting with blanket over head but introductions made. 2345: Tolerated tx well. At end all possible blood returned with 300 mls NS. Needles removed and pressure applied until hemostasis achieved. VSS, no distress or voiced needs. Remains in ICU on monitor. Call bell and personal belongings in reach with bed in lowest position.

## 2021-12-28 NOTE — PROGRESS NOTES
0730 - Bedside shift change report given to Mabel Barton (oncoming nurse) by Anupama Up (offgoing nurse). Report included the following information SBAR, Kardex, Procedure Summary and Intake/Output. 1005 - Fentenyl 50 mcg given for pain to abdomen. States pain is 7.5-8 on scale of 1-10.    1025 - Interdisciplinary rounds were held with the following team members Nurse, Pharmacist, MD, . .    Plan of care discussed. Goal: see MD orders and progress notes for further interventions and desired outcomes. 1152 - Incontinence check per pt request.  No stool noted. Turned pt to left side using pillow wedge. Bilateral feet offloaded for tender, boggy heels. Left heel appears to be opening up. Wound care consult placed. 1326 - Pt taken to IR for procedure. 1323 - TRANSFER - IN REPORT:    Verbal report received from Angio RN on Yoanna Muller  being received from IR(unit) for ordered procedure      Report consisted of patients Situation, Background, Assessment and   Recommendations(SBAR). Information from the following report(s) Procedure Summary was reviewed with the receiving nurse. Opportunity for questions and clarification was provided.

## 2021-12-28 NOTE — ANESTHESIA POSTPROCEDURE EVALUATION
* No procedures listed *.    general    Anesthesia Post Evaluation        Patient location during evaluation: ICU  Note status: Adequate. Level of consciousness: obtunded/minimal responses  Pain management: adequate  Airway patency: patent  Anesthetic complications: no  Cardiovascular status: acceptable  Respiratory status: ETT and ventilator  Hydration status: acceptable  Comments: +Post-Anesthesia Evaluation and Assessment    Patient: Jing De Souza MRN: [id number]  SSN: [id number]   YOB: 1975  Age: 42 y.o. Sex: male      Cardiovascular Function/Vital Signs    BP (!) 101/59   Pulse 87   Temp 36.4 °C (97.6 °F)   Resp 12   SpO2 98%     Patient is status post * No procedures listed *. Nausea/Vomiting: Controlled. Postoperative hydration reviewed and adequate. Pain:      Managed. Neurological Status: At baseline. Mental Status and Level of Consciousness: Sedated. Pulmonary Status:       Adequate oxygenation and airway patent. Remains intubated/sedated/mechanically ventilated. Complications related to anesthesia: None    Post-anesthesia assessment completed. This patient remains severely ill. Signed By: Glenys Banerjee MD    12/28/2021  Post anesthesia nausea and vomiting:  controlled      INITIAL Post-op Vital signs: No vitals data found for the desired time range.

## 2021-12-28 NOTE — PROGRESS NOTES
Nephrology Progress Note  Blaise Leblanc     [email or web address]  Phone - [phone]   Patient: Yoanna Muller    YOB: 1975        Date- 12/28/2021   Admit Date: 12/26/2021  CC: Follow up for esrd          IMPRESSION & PLAN:   ·  esrd - home HD 5 days per week- Follows up with Dr Maggy Pet[facility] at [facility]  · Covid 19 infection  · Hypotension  · Acute blood loss anemia   · H/o esophageal bleed from esophagitis  · Left arm swelling- s/p angioplasty of left subclavian vein  · Gram positive sepsis from dental abcess  · s/p lead extraction at [facility]  · Anemia of ckd  · Hx of renal tx in past times 2.  · Hypertension  · CAD  · Failed RTX times 2  · H/o DVT, s/p ivc filter/ h/o HIT    · Hyperkalemia    PLAN-   Plan for hemodialysis tomorrow.  He is still significantly volume overloaded so we will plan for isolated UF today.  He scheduled to get 1 unit of packed RBC.  He also scheduled to get embolization procedure by IR.  We will ask vascular surgery to see for left arm swelling and fistula assessment. Subjective: Interval History:   -Seen and examined in ICU through the glass door  -Discussed with critical care team  -Also spoke to the patient with using phone      Objective:   Vitals:    12/28/21 0900 12/28/21 1000 12/28/21 1100 12/28/21 1200   BP: 114/74 116/72 112/73 110/72   Pulse: 74 81 74 76   Resp: 15 25 16 15   Temp:       TempSrc:       SpO2:  100% 99% 97%   Weight:       Height:          12/27 0701 - 12/28 0700  In: 1586.9 [P.O.:360; I.V.:1226.9]  Out: 2002   Last 3 Recorded Weights in this Encounter    12/26/21 0023 12/27/21 0700 12/28/21 0400   Weight: 61.2 kg (135 lb) 65.8 kg (145 lb 1 oz) 65.3 kg (143 lb 15.4 oz)      Physical exam:   GEN: NAD  NECK- Supple  RESP: no distress  NEURO:non focal  Patient examined through the glass door to minimize exposure to Covid and to preserve PPE      Chart reviewed. Pertinent Notes reviewed.      Data Review :  Recent Labs     12/28/21  0349 12/27/21  0421 12/26/21  0111    137 136   K 4.5 6.3* 4.7    105 102   CO2 30 22 27   BUN 29* 53* 32*   CREA 5.15* 8.23* 7.27*   * 128* 92   CA 7.4* 7.4* 7.5*   MG 2.2 2.6*  --    PHOS 4.2 5.8*  --      Recent Labs     12/28/21  0349 12/27/21  0421 12/26/21  1803 12/26/21  1352 12/26/21  0111   WBC 8.4 7.3  --   --  12.9*   HGB 6.6* 7.1* 7.2*   < > 8.2*   HCT 20.8* 22.3* 22.1*   < > 26.4*    189  --   --  156    < > = values in this interval not displayed. No results for input(s): FE, TIBC, PSAT, FERR in the last 72 hours.    Medication list  reviewed  Current Facility-Administered Medications   Medication Dose Route Frequency    predniSONE (DELTASONE) tablet 50 mg  50 mg Oral ONCE    0.9% sodium chloride infusion 250 mL  250 mL IntraVENous PRN    fentaNYL citrate (PF) injection 50 mcg  50 mcg IntraVENous Q4H PRN    oxyCODONE-acetaminophen (PERCOCET) 5-325 mg per tablet 1 Tablet  1 Tablet Oral Q4H PRN    sodium chloride (NS) flush 5-40 mL  5-40 mL IntraVENous Q8H    sodium chloride (NS) flush 5-40 mL  5-40 mL IntraVENous PRN    acetaminophen (TYLENOL) tablet 650 mg  650 mg Oral Q6H PRN    Or    acetaminophen (TYLENOL) suppository 650 mg  650 mg Rectal Q6H PRN    polyethylene glycol (MIRALAX) packet 17 g  17 g Oral DAILY PRN    ondansetron (ZOFRAN) injection 4 mg  4 mg IntraVENous Q6H PRN    octreotide (SANDOSTATIN) 500 mcg in 0.9% sodium chloride 500 mL infusion  50 mcg/hr IntraVENous CONTINUOUS    pantoprazole (PROTONIX) 40 mg in 0.9% sodium chloride 10 mL injection  40 mg IntraVENous Q12H    alcohol 62% (NOZIN) nasal  1 Ampule  1 Ampule Topical Q12H    NOREPINephrine (LEVOPHED) 8 mg in 5% dextrose 250mL (32 mcg/mL) infusion  0.5-16 mcg/min IntraVENous TITRATE    epoetin emilie-epbx (RETACRIT) injection 20,000 Units  20,000 Units SubCUTAneous Q MON, WED & Rodolfo Love MD              [facility] [address], [address] [place], [address]  Phone - [phone]               Fax - [phone]

## 2021-12-28 NOTE — PROGRESS NOTES
ICU Progress Note        Subjective: Overnight events noted. Vital Signs:    Visit Vitals  /62   Pulse 77   Temp 98.4 °F (36.9 °C)   Resp 20   Ht 5' 7\" (1.702 m)   Wt 65.3 kg (143 lb 15.4 oz)   SpO2 94%   BMI 22.55 kg/m²       O2 Device: None (Room air)   O2 Flow Rate (L/min): 2 l/min   Temp (24hrs), Av.1 °F (36.7 °C), Min:97.6 °F (36.4 °C), Max:98.4 °F (36.9 °C)       Intake/Output:   Last shift:      701 - 1900  In: -   Out: 1   Last 3 shifts: 1901 - 700  In: 2232.5 [P.O.:360; I.V.:1872.5]  Out:      Intake/Output Summary (Last 24 hours) at 2021 1420  Last data filed at 2021 0841  Gross per 24 hour   Intake 1586.85 ml   Output 2003 ml   Net -416.15 ml            Physical Exam:    General: Alert, awake and oriented.  Not in acute distress  HEENT:  Anicteric sclerae; pink palpebral conjunctivae; mucosa moist  Resp:  Bilateral air entry +, no crackles or wheeze  CV:  S1, S2 present  GI:  Abdomen soft, non-tender; (+) active bowel sounds  Extremities:  +2 pulses on all extremities; edema +  Skin:  Warm; no rashes/ lesions noted  Neurologic:  Non-focal    DATA:     Current Facility-Administered Medications   Medication Dose Route Frequency    0.9% sodium chloride infusion 250 mL  250 mL IntraVENous PRN    sodium tetradecyl sulfate (SOTRADECOL) 3 % (30 mg/mL) injection soln 240 mg  8 mL IntraVENous ONCE    fentaNYL citrate (PF) injection 50 mcg  50 mcg IntraVENous Q4H PRN    oxyCODONE-acetaminophen (PERCOCET) 5-325 mg per tablet 1 Tablet  1 Tablet Oral Q4H PRN    sodium chloride (NS) flush 5-40 mL  5-40 mL IntraVENous Q8H    sodium chloride (NS) flush 5-40 mL  5-40 mL IntraVENous PRN    acetaminophen (TYLENOL) tablet 650 mg  650 mg Oral Q6H PRN    Or    acetaminophen (TYLENOL) suppository 650 mg  650 mg Rectal Q6H PRN    polyethylene glycol (MIRALAX) packet 17 g  17 g Oral DAILY PRN    ondansetron (ZOFRAN) injection 4 mg  4 mg IntraVENous Q6H PRN    octreotide (SANDOSTATIN) 500 mcg in 0.9% sodium chloride 500 mL infusion  50 mcg/hr IntraVENous CONTINUOUS    pantoprazole (PROTONIX) 40 mg in 0.9% sodium chloride 10 mL injection  40 mg IntraVENous Q12H    alcohol 62% (NOZIN) nasal  1 Ampule  1 Ampule Topical Q12H    NOREPINephrine (LEVOPHED) 8 mg in 5% dextrose 250mL (32 mcg/mL) infusion  0.5-16 mcg/min IntraVENous TITRATE    epoetin emilie-epbx (RETACRIT) injection 20,000 Units  20,000 Units SubCUTAneous Q MON, WED & FRI     Facility-Administered Medications Ordered in Other Encounters   Medication Dose Route Frequency    lidocaine (XYLOCAINE) 20 mg/mL (2 %) injection 360 mg  18 mL SubCUTAneous ONCE    iopamidoL (ISOVUE-370) 76 % injection 300 mL  300 mL IntraarTERial ONCE    0.9% sodium chloride infusion   IntraVENous CONTINUOUS    rocuronium injection   IntraVENous PRN    propofoL (DIPRIVAN) 10 mg/mL injection   IntraVENous PRN    fentaNYL citrate (PF) injection   IntraVENous PRN    lidocaine (PF) (XYLOCAINE) 20 mg/mL (2 %) injection   IntraVENous PRN    PHENYLephrine (NEOSYNEPHRINE) in NS syringe   IntraVENous CONTINUOUS    midazolam (VERSED) injection   IntraVENous PRN    succinylcholine (ANECTINE) injection   IntraVENous PRN    ePHEDrine in NS (PF) (MISTOLE) 10 mg/mL in NS syringe   IntraVENous PRN         Labs: Results:       Chemistry Recent Labs     12/28/21 0349 12/27/21  0421 12/26/21  0111   * 128* 92    137 136   K 4.5 6.3* 4.7    105 102   CO2 30 22 27   BUN 29* 53* 32*   CREA 5.15* 8.23* 7.27*   CA 7.4* 7.4* 7.5*   AGAP 7 10 7   BUCR 6* 6* 4*   AP  --   --  177*   TP  --   --  5.9*   ALB  --   --  1.9*   GLOB  --   --  4.0   AGRAT  --   --  0.5*      CBC w/Diff Recent Labs     12/28/21 0349 12/27/21  0421 12/26/21  1803 12/26/21  1352 12/26/21  0111   WBC 8.4 7.3  --   --  12.9*   RBC 2.17* 2.39*  --   --  2.76*   HGB 6.6* 7.1* 7.2*   < > 8.2*   HCT 20.8* 22.3* 22.1*   < > 26.4*    189  --   --  156 GRANS  --   --   --   --  54   LYMPH  --   --   --   --  27   EOS  --   --   --   --  6    < > = values in this interval not displayed. Coagulation Recent Labs     12/26/21  0212   PTP 12.8*   INR 1.2*   APTT 22.7       Liver Enzymes Recent Labs     12/26/21  0111   TP 5.9*   ALB 1.9*   *      ABG No results found for: PH, PHI, PCO2, PCO2I, PO2, PO2I, HCO3, HCO3I, FIO2, FIO2I   Microbiology No results for input(s): CULT in the last 72 hours. maging:  CXR Results  (Last 48 hours)    None          CT Results  (Last 48 hours)    None               Assessment and Plan:    Miriam Mccord is a 42 y.o. male who has a PMH of ESRD on HD via fistula, recent COVID-19 infection, recent UGIB secondary to gastric varices and esophageal ulcerations c/b hemorrhagic shock, recent S intermedius bacteremia, recurrent DVT, IVC filter, HFrEF 35% s/p AICD, CAD, and severe pulmonary HTN who was recently discharged from [facility] on [facility] following a prolonged hospital course for GIB secondary to GVs and esophageal ulcerations and also COVID-19 pneumonia. He had been taken off isolation prior to discharge. He presented to the ED on 12/26 reporting 4 episodes of bright red hematemesis and 1 episode of maroon stool. He also reported feeling \"weak and dizzy\". He experienced a syncopal episode in the ED upon standing. H/H 8.6/26.4    Acute GI bleed/Hemorrhagic shock -  He is known to have gastric varices, unfortunately not a candidate for endoscopic intervention (already had four EGD's). IR planning on embolization procedure. Cont. PPI and Octreotide. Monitor H/H and transfuse as needed for Hb >7 gm/dl. Cont. Levophed to keep MAP 65 and above. Of note his Hemoglobin dropped to <7 gm/dll 12/28, triggering transfusion of one unit. ESRD on HD - failed renal transplant. Cont. HD as per nephrology. HHrEF/Pulmonary hypertension - EF 35%. Has AICD. Cont. To monitor closely. Keep euvolemic.  Hold beta blockers (Off pressors but BP still soft). History of  HIT - Avoid heparin. History of DVT - IVC filter. Hold anticoagulants due to GI bleed. Pain - Patient is on chronic pain medications (percocet at home). He has been asking for pain medications every 1 hour here for abdominal pain. Cont. counseling. [facility] time - 40 minutes.      Brayan Delgado MD, FCCP, ATSF, FACP, DAABIP  Interventional Pulmonology/Critical [address] [place] [address]

## 2021-12-28 NOTE — H&P
Interventional and Vascular Radiology History and Physical    Patient: Adolph Quevedo 42 y.o. male       Chief Complaint: Bleeding gastric varices    History of Present Illness: 42year old male with refractory bleeding gastric varices status-post multiple EGD who is not an ideal TIPS candidate presents for BRTO with pulmonary arterial and right atrial pressure measurements. If no gastric variceal complex is visualized via the splenorenal shunt complex, will plan transhepatic variceal embolization versus TIPS. History:    Past Medical History:   Diagnosis Date    Abdominal hematoma     AICD (automatic cardioverter/defibrillator) present     CAD (coronary artery disease)     anterior MI s/p 2 stents  2007    Chronic abdominal pain     Chronic kidney disease     ESRD; Dialysis dependent.  Home [facility] does labs- [facility] tpke    DVT (deep venous thrombosis) ([address] 2001    DVT of popliteal vein ([address] 11/2011    not anticoagulated due to bleeding, IVC filter    Endocarditis     Gallstone pancreatitis     Gastrointestinal disorder     acid reflux    Gastrointestinal disorder     peptic ulcer    Hemodialysis patient ([address]     High cholesterol     Hypertension     Kidney transplant     b/l kidneys 1995, 2001    Long term current use of anticoagulant therapy     Nephrotic syndrome     Other ill-defined conditions(799.89)     kidny transplant x2,  on dialysis    Other ill-defined conditions(799.89)     home dialysis    Other ill-defined conditions(799.89)     hx recurrent left leg DVT    Peritonitis ([address]     Seizures ([address] 2015    most recent- only had three in lifetime    Small bowel obstruction (HCC)     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    V-tach ([address]      Family History   Problem Relation Age of Onset    COPD Mother     Lung Disease Mother     Cancer Father         stomach    Cancer Maternal Grandmother      Social History     Socioeconomic History    Marital status: SINGLE     Spouse name: Not on file    Number of children: Not on file    Years of education: Not on file    Highest education level: Not on file   Occupational History    Not on file   Tobacco Use    Smoking status: Never Smoker    Smokeless tobacco: Never Used   Substance and Sexual Activity    Alcohol use: No    Drug use: Yes     Types: Prescription, OTC    Sexual activity: Not on file   Other Topics Concern    Not on file   Social History Narrative    Not on file     Social Determinants of Health     Financial Resource Strain:     Difficulty of Paying Living Expenses: Not on file   Food Insecurity:     Worried About Running Out of Food in the Last Year: Not on file    Marleny of Food in the Last Year: Not on file   Transportation Needs:     Lack of Transportation (Medical): Not on file    Lack of Transportation (Non-Medical): Not on file   Physical Activity:     Days of Exercise per Week: Not on file    Minutes of Exercise per Session: Not on file   Stress:     Feeling of Stress : Not on file   Social Connections:     Frequency of Communication with Friends and Family: Not on file    Frequency of Social Gatherings with Friends and Family: Not on file    Attends [employer] Services: Not on file    Active Member of [address] [place] [address] [employer] or Organizations: Not on file    Attends Club or Organization Meetings: Not on file    Marital Status: Not on file   Intimate Partner Violence:     Fear of Current or Ex-Partner: Not on file    Emotionally Abused: Not on file    Physically Abused: Not on file    Sexually Abused: Not on file   Housing Stability:     Unable to Pay for Housing in the Last Year: Not on file    Number of [place] in the Last Year: Not on file    Unstable Housing in the Last Year: Not on file       Allergies:    Allergies   Allergen Reactions    Shellfish Containing Products Swelling     Break out in rash, trouble breathing    Contrast Agent [Iodine] Shortness of Breath    Heparin Analogues Other (comments)     Positive history of HIT       Current Medications:  Current Facility-Administered Medications   Medication Dose Route Frequency    lidocaine (XYLOCAINE) 20 mg/mL (2 %) injection 360 mg  18 mL SubCUTAneous ONCE    iopamidoL (ISOVUE-370) 76 % injection 300 mL  300 mL IntraarTERial ONCE     No current outpatient medications on file. Facility-Administered Medications Ordered in Other Encounters   Medication Dose Route Frequency    0.9% sodium chloride infusion 250 mL  250 mL IntraVENous PRN    sodium tetradecyl sulfate (SOTRADECOL) 3 % (30 mg/mL) injection soln 240 mg  8 mL IntraVENous ONCE    fentaNYL citrate (PF) injection 50 mcg  50 mcg IntraVENous Q4H PRN    oxyCODONE-acetaminophen (PERCOCET) 5-325 mg per tablet 1 Tablet  1 Tablet Oral Q4H PRN    sodium chloride (NS) flush 5-40 mL  5-40 mL IntraVENous Q8H    sodium chloride (NS) flush 5-40 mL  5-40 mL IntraVENous PRN    acetaminophen (TYLENOL) tablet 650 mg  650 mg Oral Q6H PRN    Or    acetaminophen (TYLENOL) suppository 650 mg  650 mg Rectal Q6H PRN    polyethylene glycol (MIRALAX) packet 17 g  17 g Oral DAILY PRN    ondansetron (ZOFRAN) injection 4 mg  4 mg IntraVENous Q6H PRN    octreotide (SANDOSTATIN) 500 mcg in 0.9% sodium chloride 500 mL infusion  50 mcg/hr IntraVENous CONTINUOUS    pantoprazole (PROTONIX) 40 mg in 0.9% sodium chloride 10 mL injection  40 mg IntraVENous Q12H    alcohol 62% (NOZIN) nasal  1 Ampule  1 Ampule Topical Q12H    NOREPINephrine (LEVOPHED) 8 mg in 5% dextrose 250mL (32 mcg/mL) infusion  0.5-16 mcg/min IntraVENous TITRATE    epoetin emilie-epbx (RETACRIT) injection 20,000 Units  20,000 Units SubCUTAneous Q MON, WED & FRI        Physical Exam:  There were no vitals taken for this visit.   No acute distress  Good peripheral perfusion  Abdomen nondistended  Procedure site right groin without abnormalities     Alerts:    Hospital Problems  Date Reviewed: 12/28/2021    None Laboratory:      Recent Labs     12/28/21  0349 12/26/21  1352 12/26/21  0212   HGB 6.6*   < >  --    HCT 20.8*   < >  --    WBC 8.4   < >  --       < >  --    INR  --   --  1.2*   BUN 29*   < >  --    CREA 5.15*   < >  --    K 4.5   < >  --     < > = values in this interval not displayed. Plan of Care/Planned Procedure:  Proceed with anesthesia. Risks, benefits, and alternatives reviewed with patient and he agrees to proceed with the procedure.        Rachel De Souza MD

## 2021-12-28 NOTE — PROGRESS NOTES
GI Progress Note   Leonora Shaw NP  NAME:Claude Drexel Margarita :1975 AIT:[id number]   Prim GI: Abdirahman Hardin MD  PCP: Carmen Charlton MD  Date/Time:  2021 10:15 AM   Assessment:   · Recurrent severe hematemesis  · Massive PRBC's transfusion since 21  · Anemia, post-hemorrhagic  · Has had now four EGD's most recently on  with bleeding GV and severe portal hypertensive gastropathy  · Severe pulmonary HTN  · +COVID  · ESRD on HD     Plan:   · Pt is not a good candidate for TIPS given severe Pulmonary HTN. Given isolated GV which clearly has bleed multiple times BRTO appears to be best option. D/w Dr. Ninfa Vyas who agrees both that not good candidate for TIPS and BRTO is best option for patient. Plan for BRTO with IR today  · Continue Octreotide  · Continue PPI  · Monitor for s/s of bleeding; transfuse as clinically indicated  · Serial H&H; goal for hgb >7.0  · Symptomatic care per primary team   *Plan of care discussed with Dr. Supa Razo      Subjective:   Patient resting in bed. Patient with sheet over his head. Review of Systems:  Symptom Y/N Comments  Symptom Y/N Comments   Fever/Chills n   Chest Pain n    Cough n   Headaches n    Sputum n   Joint Pain n    SOB/ZAMUDIO n   Pruritis/Rash n    Tolerating Diet y   Other       Could NOT obtain due to:      Objective:   VITALS:   Last 24hrs VS reviewed since prior progress note.  Most recent are:  Visit Vitals  /72   Pulse 81   Temp 97.7 °F (36.5 °C)   Resp 25   Ht 5' 7\" (1.702 m)   Wt 65.3 kg (143 lb 15.4 oz)   SpO2 100%   BMI 22.55 kg/m²       Intake/Output Summary (Last 24 hours) at 2021 1048  Last data filed at 2021 0841  Gross per 24 hour   Intake 1586.85 ml   Output  ml   Net -416.15 ml     PHYSICAL EXAM:  ~~Deferred COVID +~~    Lab and Radiology Data Reviewed: (see below)    Medications Reviewed: (see below)  PMH/SH reviewed - no change compared to H&P  ________________________________________________________________________  Care Plan discussed with:  Patient x   Family     RN x              Consultant:       Carrie Mendosa NP     Procedures: see electronic medical records for all procedures/Xrays and details which were not copied into this note but were reviewed prior to creation of Plan. LABS:  Recent Labs     12/28/21 0349 12/27/21 0421   WBC 8.4 7.3   HGB 6.6* 7.1*   HCT 20.8* 22.3*    189     Recent Labs     12/28/21 0349 12/27/21 0421 12/26/21 0111    137 136   K 4.5 6.3* 4.7    105 102   CO2 30 22 27   BUN 29* 53* 32*   CREA 5.15* 8.23* 7.27*   * 128* 92   CA 7.4* 7.4* 7.5*   MG 2.2 2.6*  --    PHOS 4.2 5.8*  --      Recent Labs     12/26/21 0111   *   TP 5.9*   ALB 1.9*   GLOB 4.0     Recent Labs     12/26/21 0212   INR 1.2*   PTP 12.8*   APTT 22.7      No results for input(s): FE, TIBC, PSAT, FERR in the last 72 hours. Lab Results   Component Value Date/Time    Folate 7.5 11/23/2011 11:26 AM     No results for input(s): PH, PCO2, PO2 in the last 72 hours. No results for input(s): CPK, CKMB in the last 72 hours.     No lab exists for component: TROPONINI  Lab Results   Component Value Date/Time    Color RED 11/16/2012 05:15 PM    Appearance OPAQUE 11/16/2012 05:15 PM    Specific gravity 1.020 11/16/2012 05:15 PM    Specific gravity 1.011 09/17/2012 01:35 AM    pH (UA) 8.0 11/16/2012 05:15 PM    Protein >300 (A) 11/16/2012 05:15 PM    Glucose 100 (A) 11/16/2012 05:15 PM    Ketone NEGATIVE  11/16/2012 05:15 PM    Bilirubin NEGATIVE  09/17/2012 01:35 AM    Urobilinogen 0.2 11/16/2012 05:15 PM    Nitrites NEGATIVE  11/16/2012 05:15 PM    Leukocyte Esterase NEGATIVE  11/16/2012 05:15 PM    Epithelial cells 5-10 11/16/2012 05:15 PM    Bacteria 3+ (A) 11/16/2012 05:15 PM    WBC >100 (H) 11/16/2012 05:15 PM    RBC >100 (H) 11/16/2012 05:15 PM       MEDICATIONS:  Current Facility-Administered Medications Medication Dose Route Frequency    predniSONE (DELTASONE) tablet 50 mg  50 mg Oral ONCE    0.9% sodium chloride infusion 250 mL  250 mL IntraVENous PRN    fentaNYL citrate (PF) injection 50 mcg  50 mcg IntraVENous Q4H PRN    oxyCODONE-acetaminophen (PERCOCET) 5-325 mg per tablet 1 Tablet  1 Tablet Oral Q4H PRN    predniSONE (DELTASONE) tablet 50 mg  50 mg Oral ONCE    sodium chloride (NS) flush 5-40 mL  5-40 mL IntraVENous Q8H    sodium chloride (NS) flush 5-40 mL  5-40 mL IntraVENous PRN    acetaminophen (TYLENOL) tablet 650 mg  650 mg Oral Q6H PRN    Or    acetaminophen (TYLENOL) suppository 650 mg  650 mg Rectal Q6H PRN    polyethylene glycol (MIRALAX) packet 17 g  17 g Oral DAILY PRN    ondansetron (ZOFRAN) injection 4 mg  4 mg IntraVENous Q6H PRN    octreotide (SANDOSTATIN) 500 mcg in 0.9% sodium chloride 500 mL infusion  50 mcg/hr IntraVENous CONTINUOUS    pantoprazole (PROTONIX) 40 mg in 0.9% sodium chloride 10 mL injection  40 mg IntraVENous Q12H    alcohol 62% (NOZIN) nasal  1 Ampule  1 Ampule Topical Q12H    NOREPINephrine (LEVOPHED) 8 mg in 5% dextrose 250mL (32 mcg/mL) infusion  0.5-16 mcg/min IntraVENous TITRATE    epoetin emilie-epbx (RETACRIT) injection 20,000 Units  20,000 Units SubCUTAneous Q MON, WED & FRI

## 2021-12-28 NOTE — PROGRESS NOTES
Brief GI Note    Discussed with Dr. Jerman Castellon again in detail. Pt did not have a large gastrorenal shunt, thus BRTO unlikely to be of benefit, if even possible. In addition direct measurement of PAP was 25 mmHg, not nearly as severe as seen on recent TTE. Given this and pt clearly has had multiple life threatening bleeds from gastric varices (exacerbated by severe portal hypertensive gastropathy) we both agree TIPS is best coarse of action, noting it does have significant risks in itself. Agree with planned elective TIPS tomorrow given patient is not currently actively bleeding.  I very much appreciate Dr. Norma Slaughter assistance    Signed By: Hussein Harmon MD     December 28, 2021

## 2021-12-28 NOTE — ANESTHESIA PREPROCEDURE EVALUATION
Relevant Problems   RESPIRATORY SYSTEM   (+) Pneumonia      CARDIOVASCULAR   (+) CAD (coronary artery disease)   (+) Congestive heart failure, unspecified   (+) Coronary atherosclerosis of native coronary artery   (+) HTN (hypertension)      RENAL FAILURE   (+) ESRD (end stage renal disease) (HCC)   (+) ESRD (end stage renal disease) on dialysis (HCC)      HEMATOLOGY   (+) Anemia       Anesthetic History     PONV          Review of Systems / Medical History  Patient summary reviewed, nursing notes reviewed and pertinent labs reviewed    Pulmonary  Within defined limits                 Neuro/Psych     seizures         Cardiovascular    Hypertension        Dysrhythmias   CAD    Exercise tolerance: >4 METS     GI/Hepatic/Renal  Within defined limits              Endo/Other  Within defined limits           Other Findings   Comments: Hypertension  High cholesterol  Kidney transplant  AICD (automatic cardioverter/defibrillator) present  Peritonitis (HCC)  Abdominal hematoma  Nephrotic syndrome  Thrombocytopenia            Physical Exam    Airway  Mallampati: III  TM Distance: 4 - 6 cm  Neck ROM: normal range of motion   Mouth opening: Normal     Cardiovascular  Regular rate and rhythm,  S1 and S2 normal,  no murmur, click, rub, or gallop  Rhythm: regular  Rate: normal         Dental  No notable dental hx       Pulmonary  Breath sounds clear to auscultation               Abdominal  GI exam deferred       Other Findings            Anesthetic Plan    ASA: 3  Anesthesia type: general    Monitoring Plan: BIS      Induction: Intravenous  Anesthetic plan and risks discussed with: Patient

## 2021-12-28 NOTE — PROGRESS NOTES
Pt arrives to Angio department with CCU RN accompany for angiogram with possible embolization. Pt is A&Ox4 with Dr. Jung Mcdermott and CRNA at bedside to consent pt. Pt verbalized understanding.

## 2021-12-28 NOTE — PROGRESS NOTES
1625- Received pt frorm IR, intubated with transfer monitor. Placed pt on wall monitor, assessment complete. PT currently opening eyes to vocal command and following commands appropriately. When asked if pt was in pain, nodded yes. Currently normal sinus with stable blood pressure. [address] Dr. Hussein Dumont regarding sedation and intubation. Will plan to leave pt intubated for procedure tomorrow. Will start Precedex to keep pt comfortable. 1656- PRN morphine given for pain. [address] with Kevin Vega (sister) regarding procedure, updated on pt condition and plan of care. 1900- Bedside shift report given to RAINER Donohue

## 2021-12-28 NOTE — PROGRESS NOTES
Transition of Care Plan:     RUR: 31% \"high risk\"  Disposition: Home with [facility] (PT/OT/SN) & follow-up appts  OP HD- [facility] [place]: T/Th/Sat with 6:00 AM chair time  Follow up appointments: PCP, GI, Nephrology  DME needed: Rollator- referral was send to [address] [place] [address]  at [address] will transport   [facility] or means to access home:  Yes       Medicare Letter: Will provide   Is patient a BCPI-A Bundle: No              If yes, was Bundle Letter given?:    Is patient a  and connected with the [address] [place] [address] Road  If yes, was [employer] transfer form completed and [facility] notified? Caregiver Contact: Sister- Sparkle Fernandez, [phone]  Discharge Caregiver contacted prior to discharge? No    Reason for Readmission:   GIB           RUR Score/Risk Level:     31% High     PCP: First and Last name:  Chago Dill    Name of Practice:    Are you a current patient: Yes/No: Yes   Approximate date of last visit:  Not sure    Can you participate in a virtual visit with your PCP:     Is a Care Conference indicated: IDR will discuss       Did you attend your follow up appointment (s): If not, why not:  No, rapid readmission        Resources/supports as identified by patient/family:  Pt lives with his sister and she  is very helpful. Top Challenges facing patient (as identified by patient/family and CM):          Finances/Medication cost?   Pt has medicare and medicaid benefits. Transportation    Sister will transport    Support system or lack thereof? No, pt has good supportive family. Living arrangements? Lives with Sister in a duplex house. Self-care/ADLs/Cognition?   Independent with ADLs and IADLs          Current Advanced Directive/Advance Care Plan:  Full code            Plan for utilizing home health:  Need a resumption order upon d/c              Transition of Care Plan:    Based on readmission, the patient's previous Plan of [address]   has been evaluated and/or modified. The current Transition of Care Plan is:     Home with [place]        Readmission Assessment  Number of days since last admission?: 1-7 days  Previous disposition: Home with Home Health  Who is being interviewed?: Patient  What was the patient's/caregiver's perception as to why they think they needed to return back to the hospital?: Did not realize care needs would be so extensive  Did you visit your Primary Care Physician after you left the hospital, before you returned this time?: No  Why weren't you able to visit your PCP?: Other (Comment) (Rapid readmission)  Did you see a specialist, such as Cardiac, Pulmonary, Orthopedic Physician, etc. after you left the hospital?: No  Who advised the patient to return to the hospital?: Self-referral  In our efforts to provide the best possible care to you and others like you, can you think of anything that we could have done to help you after you left the hospital the first time, so that you might not have needed to return so soon?: Arrange for more help when leaving the hospital,Additional Community resources available for illness support     Care Management Interventions  PCP Verified by CM:  Yes  Mode of Transport at Discharge: Self  Transition of Care Consult (CM Consult): [address] [facility] was set via [facility] [place] when he was admitted lat time, need a resumption order upon d/c )  [address] No  Reason Outside [place]: Patient already serviced by other home care/hospice agency  Discharge [address]:  (As per chart a rollator was order from [facility] for 12/27/2022.)  Support Systems: Other Family Member(s),Caregiver/Home Care Staff  Confirm Follow Up Transport: Family  Discharge Location  Discharge Placement: Home with home health    Bran Art (TIFFANIE) Conversation      Date of Conversation: 12/26/2021  Conducted with: Patient with Decision Making Capacity    Healthcare Decision Maker:     Primary Decision Maker: Lior Thompson   - [phone]  Click here to complete [employer] including selection of the Healthcare Decision Maker Relationship (ie \"Primary\")          Content/Action Overview:   DECLINED ACP conversation - will revisit periodically   Reviewed DNR/DNI and patient elects Full Code (Attempt Resuscitation)    Length of Voluntary ACP Conversation in minutes:  12 minutes    [facility]  ED Case Manager   Ext [phone]

## 2021-12-29 ENCOUNTER — ANESTHESIA EVENT (OUTPATIENT)
Dept: INTERVENTIONAL RADIOLOGY/VASCULAR | Age: 46
DRG: 270 | End: 2021-12-29
Payer: MEDICARE

## 2021-12-29 ENCOUNTER — ANESTHESIA (OUTPATIENT)
Dept: INTERVENTIONAL RADIOLOGY/VASCULAR | Age: 46
DRG: 270 | End: 2021-12-29
Payer: MEDICARE

## 2021-12-29 ENCOUNTER — HOSPITAL ENCOUNTER (OUTPATIENT)
Dept: INTERVENTIONAL RADIOLOGY/VASCULAR | Age: 46
Discharge: HOME | End: 2021-12-29
Attending: INTERNAL MEDICINE

## 2021-12-29 LAB
ABO + RH BLD: NORMAL
ANION GAP SERPL CALC-SCNC: 7 MMOL/L (ref 5–15)
ATRIAL RATE: 42 BPM
BLD PROD TYP BPU: NORMAL
BLOOD GROUP ANTIBODIES SERPL: NORMAL
BPU ID: NORMAL
BUN SERPL-MCNC: 39 MG/DL (ref 6–20)
BUN/CREAT SERPL: 6 (ref 12–20)
CALCIUM SERPL-MCNC: 7.2 MG/DL (ref 8.5–10.1)
CALCULATED P AXIS, ECG09: 68 DEGREES
CALCULATED R AXIS, ECG10: 82 DEGREES
CALCULATED T AXIS, ECG11: 134 DEGREES
CHLORIDE SERPL-SCNC: 102 MMOL/L (ref 97–108)
CO2 SERPL-SCNC: 27 MMOL/L (ref 21–32)
CREAT SERPL-MCNC: 6.19 MG/DL (ref 0.7–1.3)
CROSSMATCH RESULT,%XM: NORMAL
DIAGNOSIS, 93000: NORMAL
ERYTHROCYTE [DISTWIDTH] IN BLOOD BY AUTOMATED COUNT: 18.6 % (ref 11.5–14.5)
GLUCOSE SERPL-MCNC: 133 MG/DL (ref 65–100)
HCT VFR BLD AUTO: 28.9 % (ref 36.6–50.3)
HGB BLD-MCNC: 9.1 G/DL (ref 12.1–17)
MAGNESIUM SERPL-MCNC: 2.4 MG/DL (ref 1.6–2.4)
MCH RBC QN AUTO: 31.2 PG (ref 26–34)
MCHC RBC AUTO-ENTMCNC: 31.5 G/DL (ref 30–36.5)
MCV RBC AUTO: 99 FL (ref 80–99)
NRBC # BLD: 0.03 K/UL (ref 0–0.01)
NRBC BLD-RTO: 0.2 PER 100 WBC
P-R INTERVAL, ECG05: 174 MS
PHOSPHATE SERPL-MCNC: 6.5 MG/DL (ref 2.6–4.7)
PLATELET # BLD AUTO: 183 K/UL (ref 150–400)
PMV BLD AUTO: 11.9 FL (ref 8.9–12.9)
POTASSIUM SERPL-SCNC: 5.7 MMOL/L (ref 3.5–5.1)
Q-T INTERVAL, ECG07: 700 MS
QRS DURATION, ECG06: 112 MS
QTC CALCULATION (BEZET), ECG08: 584 MS
RBC # BLD AUTO: 2.92 M/UL (ref 4.1–5.7)
SODIUM SERPL-SCNC: 136 MMOL/L (ref 136–145)
SPECIMEN EXP DATE BLD: NORMAL
STATUS OF UNIT,%ST: NORMAL
UNIT DIVISION, %UDIV: 0
VENTRICULAR RATE, ECG03: 42 BPM
WBC # BLD AUTO: 14.8 K/UL (ref 4.1–11.1)

## 2021-12-29 PROCEDURE — 74011250636 HC RX REV CODE- 250/636: Performed by: INTERNAL MEDICINE

## 2021-12-29 PROCEDURE — 74011000250 HC RX REV CODE- 250: Performed by: NURSE PRACTITIONER

## 2021-12-29 PROCEDURE — 65610000006 HC RM INTENSIVE CARE

## 2021-12-29 PROCEDURE — 85027 COMPLETE CBC AUTOMATED: CPT

## 2021-12-29 PROCEDURE — 74011250637 HC RX REV CODE- 250/637: Performed by: NURSE PRACTITIONER

## 2021-12-29 PROCEDURE — C9113 INJ PANTOPRAZOLE SODIUM, VIA: HCPCS | Performed by: NURSE PRACTITIONER

## 2021-12-29 PROCEDURE — 94003 VENT MGMT INPAT SUBQ DAY: CPT

## 2021-12-29 PROCEDURE — 74011000250 HC RX REV CODE- 250: Performed by: INTERNAL MEDICINE

## 2021-12-29 PROCEDURE — 84100 ASSAY OF PHOSPHORUS: CPT

## 2021-12-29 PROCEDURE — 74011000258 HC RX REV CODE- 258: Performed by: INTERNAL MEDICINE

## 2021-12-29 PROCEDURE — 74011250636 HC RX REV CODE- 250/636: Performed by: NURSE PRACTITIONER

## 2021-12-29 PROCEDURE — 74011636637 HC RX REV CODE- 636/637: Performed by: NURSE PRACTITIONER

## 2021-12-29 PROCEDURE — 83735 ASSAY OF MAGNESIUM: CPT

## 2021-12-29 PROCEDURE — 77010033678 HC OXYGEN DAILY

## 2021-12-29 PROCEDURE — 90935 HEMODIALYSIS ONE EVALUATION: CPT

## 2021-12-29 PROCEDURE — 2709999900 HC NON-CHARGEABLE SUPPLY

## 2021-12-29 PROCEDURE — 80048 BASIC METABOLIC PNL TOTAL CA: CPT

## 2021-12-29 PROCEDURE — 74011636637 HC RX REV CODE- 636/637: Performed by: STUDENT IN AN ORGANIZED HEALTH CARE EDUCATION/TRAINING PROGRAM

## 2021-12-29 PROCEDURE — 74011250637 HC RX REV CODE- 250/637: Performed by: INTERNAL MEDICINE

## 2021-12-29 RX ORDER — PROPOFOL 10 MG/ML
0-50 VIAL (ML) INTRAVENOUS
Status: DISCONTINUED | OUTPATIENT
Start: 2021-12-29 | End: 2021-12-30

## 2021-12-29 RX ORDER — FENTANYL CITRATE 50 UG/ML
50 INJECTION, SOLUTION INTRAMUSCULAR; INTRAVENOUS
Status: DISCONTINUED | OUTPATIENT
Start: 2021-12-29 | End: 2021-12-30

## 2021-12-29 RX ORDER — MAG HYDROX/ALUMINUM HYD/SIMETH 200-200-20
30 SUSPENSION, ORAL (FINAL DOSE FORM) ORAL
Status: DISCONTINUED | OUTPATIENT
Start: 2021-12-29 | End: 2022-01-18 | Stop reason: HOSPADM

## 2021-12-29 RX ADMIN — SODIUM CHLORIDE 40 MG: 9 INJECTION, SOLUTION INTRAMUSCULAR; INTRAVENOUS; SUBCUTANEOUS at 20:43

## 2021-12-29 RX ADMIN — FENTANYL CITRATE 50 MCG: 0.05 INJECTION, SOLUTION INTRAMUSCULAR; INTRAVENOUS at 13:23

## 2021-12-29 RX ADMIN — SODIUM CHLORIDE, PRESERVATIVE FREE 10 ML: 5 INJECTION INTRAVENOUS at 13:00

## 2021-12-29 RX ADMIN — FENTANYL CITRATE 50 MCG: 0.05 INJECTION, SOLUTION INTRAMUSCULAR; INTRAVENOUS at 17:27

## 2021-12-29 RX ADMIN — Medication 1 AMPULE: at 20:43

## 2021-12-29 RX ADMIN — FENTANYL CITRATE 50 MCG: 50 INJECTION INTRAMUSCULAR; INTRAVENOUS at 23:41

## 2021-12-29 RX ADMIN — Medication: at 17:31

## 2021-12-29 RX ADMIN — NOREPINEPHRINE BITARTRATE 5 MCG/MIN: 1 INJECTION, SOLUTION, CONCENTRATE INTRAVENOUS at 17:28

## 2021-12-29 RX ADMIN — CHLORHEXIDINE GLUCONATE 15 ML: 1.2 RINSE ORAL at 08:13

## 2021-12-29 RX ADMIN — Medication: at 08:13

## 2021-12-29 RX ADMIN — OCTREOTIDE ACETATE 50 MCG/HR: 500 INJECTION, SOLUTION INTRAVENOUS; SUBCUTANEOUS at 00:15

## 2021-12-29 RX ADMIN — Medication 1 AMPULE: at 08:19

## 2021-12-29 RX ADMIN — OCTREOTIDE ACETATE 50 MCG/HR: 500 INJECTION, SOLUTION INTRAVENOUS; SUBCUTANEOUS at 10:39

## 2021-12-29 RX ADMIN — FENTANYL CITRATE 100 MCG/HR: 50 INJECTION INTRAVENOUS at 02:55

## 2021-12-29 RX ADMIN — FENTANYL CITRATE 50 MCG: 0.05 INJECTION, SOLUTION INTRAMUSCULAR; INTRAVENOUS at 21:38

## 2021-12-29 RX ADMIN — SODIUM CHLORIDE 40 MG: 9 INJECTION, SOLUTION INTRAMUSCULAR; INTRAVENOUS; SUBCUTANEOUS at 08:12

## 2021-12-29 RX ADMIN — OCTREOTIDE ACETATE 50 MCG/HR: 500 INJECTION, SOLUTION INTRAVENOUS; SUBCUTANEOUS at 20:50

## 2021-12-29 RX ADMIN — PREDNISONE 50 MG: 20 TABLET ORAL at 18:58

## 2021-12-29 RX ADMIN — ALUMINUM HYDROXIDE, MAGNESIUM HYDROXIDE, AND SIMETHICONE 30 ML: 200; 200; 20 SUSPENSION ORAL at 19:01

## 2021-12-29 RX ADMIN — SODIUM CHLORIDE, PRESERVATIVE FREE 40 ML: 5 INJECTION INTRAVENOUS at 05:33

## 2021-12-29 RX ADMIN — CHLORHEXIDINE GLUCONATE 15 ML: 1.2 RINSE ORAL at 20:43

## 2021-12-29 RX ADMIN — SODIUM CHLORIDE, PRESERVATIVE FREE 10 ML: 5 INJECTION INTRAVENOUS at 21:32

## 2021-12-29 NOTE — PROGRESS NOTES
Physician Progress Note      PATIENT:               Christiana Villegas  CSN #:                  [id number]  :                       1975  ADMIT DATE:       2021 11:42 AM  DISCH DATE:        2021 3:30 PM  RESPONDING  PROVIDER #:        Liz Byrd MD          QUERY TEXT:    Dr Ayla Slade  Patient admitted with Debora. Noted documentation of  \"admission + empiric IV cefepime to cover gram-negative sepsis due to severe lactic acidosis\" and diagnosed with Streptococcus intermedius bacteremia which was thought to be from ? Dental caries as source-was discharged from MR [address] on vancomycin with dialysis as outpatient. If possible, please document in progress notes and discharge summary if you are evaluating and /or treating any of the following: The medical record reflects the following:  Risk Factors: presents with fever, hypotension, COVID, history of sepsis  Clinical Indicators: hypotension, fever, sepsis noted. Treatment: Maxipime, Levaquin, 500cc bolus NS,  Options provided:  -- Sepsis confirmed POA  -- Sepsis not POA confirmed  -- Other - I will add my own diagnosis  -- Disagree - Not applicable / Not valid  -- Disagree - Clinically unable to determine / Unknown  -- Refer to Clinical Documentation Reviewer    PROVIDER RESPONSE TEXT:    Provider is clinically unable to determine a response to this query. Query created by:  David Loving on 2021 10:39 AM      Electronically signed by:  Liz Byrd MD 2021 7:17 AM

## 2021-12-29 NOTE — PROGRESS NOTES
GI Progress Note   Amber Sagastume NP  NAME:Claude Carlene Stage :1975 EYO:[id number]   Prim GI: Loan Fong MD  PCP: Robbin Ortiz MD  Date/Time:  2021 10:15 AM   Assessment:   · Recurrent severe hematemesis  · Massive PRBC's transfusion since 23  · Anemia, post-hemorrhagic  · Has had now four EGD's most recently on  with bleeding GV and severe portal hypertensive gastropathy  · Severe pulmonary HTN  · ESRD on HD     Plan:   · After discussion with IR yesterday is was noted that the patient did not have large gastrorenal shunt, BRTO not likely to be of any benefit. PAP measurement was 25 mmHg which is not nearly as severe as seen on recent TTE. After discussion with IR, TIPS procedure is the best coarse of action. Plan for TIPS this AM with IR   · Continue Octreotide  · Continue PPI  · Monitor for s/s of bleeding; transfuse as clinically indicated  · Serial H&H; goal for hgb >7.0  · Symptomatic care per primary team   *Plan of care discussed with Dr. Esteban Gutierrez      Subjective:   Patient resting in bed. Patient intubated on Precedex. No new complaints. Updated patient on plan of care. Patient shook head in understanding. No further questions at this time. Review of Systems:  Symptom Y/N Comments  Symptom Y/N Comments   Fever/Chills n   Chest Pain n    Cough n   Headaches n    Sputum n   Joint Pain n    SOB/ZAMUDIO n   Pruritis/Rash n    Tolerating Diet n NPO  Other       Could NOT obtain due to:      Objective:   VITALS:   Last 24hrs VS reviewed since prior progress note.  Most recent are:  Visit Vitals  BP (!) 98/59   Pulse 71   Temp 97.9 °F (36.6 °C)   Resp 16   Ht 5' 7\" (1.702 m)   Wt 65.3 kg (143 lb 15.4 oz)   SpO2 100%   BMI 22.55 kg/m²       Intake/Output Summary (Last 24 hours) at 2021 8869  Last data filed at 2021 0900  Gross per 24 hour   Intake 2076.3 ml   Output 2500 ml   Net -423.7 ml     PHYSICAL EXAM:  VSSAF  Gen: NAD  Abd: soft, NT    Lab and Radiology Data Reviewed: (see below)    Medications Reviewed: (see below)  PMH/SH reviewed - no change compared to H&P  ________________________________________________________________________  Care Plan discussed with:  Patient x   Family     RN x              Consultant:       Roseanne Oleary NP     Procedures: see electronic medical records for all procedures/Xrays and details which were not copied into this note but were reviewed prior to creation of Plan. LABS:  Recent Labs     12/29/21 0355 12/28/21 2210   WBC 14.8* 17.7*   HGB 9.1* 9.5*   HCT 28.9* 30.3*    170     Recent Labs     12/29/21  0355 12/28/21  2210 12/28/21  0349    137 138   K 5.7* 5.7* 4.5    101 101   CO2 27 29 30   BUN 39* 37* 29*   CREA 6.19* 5.81* 5.15*   * 148* 108*   CA 7.2* 7.3* 7.4*   MG 2.4 2.2 2.2   PHOS 6.5* 6.2* 4.2     No results for input(s): AP, TBIL, TP, ALB, GLOB, GGT, AML, LPSE in the last 72 hours. No lab exists for component: SGOT, GPT, AMYP, HLPSE  No results for input(s): INR, PTP, APTT, INREXT, INREXT in the last 72 hours. No results for input(s): FE, TIBC, PSAT, FERR in the last 72 hours. Lab Results   Component Value Date/Time    Folate 7.5 11/23/2011 11:26 AM     No results for input(s): PH, PCO2, PO2 in the last 72 hours. No results for input(s): CPK, CKMB in the last 72 hours.     No lab exists for component: TROPONINI  Lab Results   Component Value Date/Time    Color RED 11/16/2012 05:15 PM    Appearance OPAQUE 11/16/2012 05:15 PM    Specific gravity 1.020 11/16/2012 05:15 PM    Specific gravity 1.011 09/17/2012 01:35 AM    pH (UA) 8.0 11/16/2012 05:15 PM    Protein >300 (A) 11/16/2012 05:15 PM    Glucose 100 (A) 11/16/2012 05:15 PM    Ketone NEGATIVE  11/16/2012 05:15 PM    Bilirubin NEGATIVE  09/17/2012 01:35 AM    Urobilinogen 0.2 11/16/2012 05:15 PM    Nitrites NEGATIVE  11/16/2012 05:15 PM    Leukocyte Esterase NEGATIVE  11/16/2012 05:15 PM    Epithelial cells 5-10 11/16/2012 05:15 PM    Bacteria 3+ (A) 11/16/2012 05:15 PM    WBC >100 (H) 11/16/2012 05:15 PM    RBC >100 (H) 11/16/2012 05:15 PM       MEDICATIONS:  Current Facility-Administered Medications   Medication Dose Route Frequency    propofol (DIPRIVAN) 10 mg/mL infusion  0-50 mcg/kg/min IntraVENous TITRATE    0.9% sodium chloride infusion 250 mL  250 mL IntraVENous PRN    balsam peru-castor oiL (VENELEX) ointment   Topical BID    chlorhexidine (PERIDEX) 0.12 % mouthwash 15 mL  15 mL Oral Q12H    fentaNYL (PF) 1,500 mcg/30 mL (50 mcg/mL) infusion  0-200 mcg/hr IntraVENous TITRATE    fentaNYL citrate (PF) injection 50 mcg  50 mcg IntraVENous Q4H PRN    oxyCODONE-acetaminophen (PERCOCET) 5-325 mg per tablet 1 Tablet  1 Tablet Oral Q4H PRN    sodium chloride (NS) flush 5-40 mL  5-40 mL IntraVENous Q8H    sodium chloride (NS) flush 5-40 mL  5-40 mL IntraVENous PRN    acetaminophen (TYLENOL) tablet 650 mg  650 mg Oral Q6H PRN    Or    acetaminophen (TYLENOL) suppository 650 mg  650 mg Rectal Q6H PRN    polyethylene glycol (MIRALAX) packet 17 g  17 g Oral DAILY PRN    ondansetron (ZOFRAN) injection 4 mg  4 mg IntraVENous Q6H PRN    octreotide (SANDOSTATIN) 500 mcg in 0.9% sodium chloride 500 mL infusion  50 mcg/hr IntraVENous CONTINUOUS    pantoprazole (PROTONIX) 40 mg in 0.9% sodium chloride 10 mL injection  40 mg IntraVENous Q12H    alcohol 62% (NOZIN) nasal  1 Ampule  1 Ampule Topical Q12H    NOREPINephrine (LEVOPHED) 8 mg in 5% dextrose 250mL (32 mcg/mL) infusion  0.5-16 mcg/min IntraVENous TITRATE    epoetin emilie-epbx (RETACRIT) injection 20,000 Units  20,000 Units SubCUTAneous Q MON, WED & FRI

## 2021-12-29 NOTE — PROCEDURES
Hemodialysis / [phone]    Vitals Pre Post Assessment Pre Post   BP BP: 106/71 (12/28/21 1853) 115/77 LOC Intubated, responds to name and commands No change   HR Pulse (Heart Rate): (!) 108 (12/28/21 1853) 45 Lungs Diminished No change   Resp Resp Rate: 16 (12/28/21 1853) 16 Cardiac Tacycardic No change   Temp Temp: 97.8 °F (36.6 °C) (12/28/21 1853) 97.5 Skin Warm/Dry No change   Weight    Edema LUE 3+ No change   Tele status Tachycardic  Pain Pain Intensity 1: 3 (12/28/21 1100)      Orders   Duration: Start: Johnsie Hitch End: 2125 Total: 2.5hrs   Dialyzer: Dialyzer/Set Up Inspection: Soledad Scott (12/28/21 1853)   K Bath: Dialysate K (mEq/L): 2 (12/27/21 2015)   Ca Bath: Dialysate CA (mEq/L): 3.0 (12/27/21 2015)   Na: Dialysate NA (mEq/L):  (UF only) (12/28/21 1853)   Bicarb: Dialysate HCO3 (mEq/L): 40 (12/27/21 2015)   Target Fluid Removal: Goal/Amount of Fluid to Remove (mL): 2500 mL (12/28/21 1853)     Access   Type & Location: SUZANNE AVF: skin CDI. No s/s of infection. + B/T. No issues with cannulation or hemostasis. Running well at .     Comments:                                        Labs   HBsAg (Antigen) / date: Negative 12/1/2021                                              HBsAb (Antibody) / date: Immune 11/21/2021   Source: Epic   Obtained/Reviewed  Critical Results Called HGB   Date Value Ref Range Status   12/28/2021 8.5 (L) 12.1 - 17.0 g/dL Final     Potassium   Date Value Ref Range Status   12/28/2021 4.5 3.5 - 5.1 mmol/L Final     Comment:     INVESTIGATED PER DELTA CHECK PROTOCOL     Calcium   Date Value Ref Range Status   12/28/2021 7.4 (L) 8.5 - 10.1 MG/DL Final     BUN   Date Value Ref Range Status   12/28/2021 29 (H) 6 - 20 MG/DL Final     Comment:     INVESTIGATED PER DELTA CHECK PROTOCOL     Creatinine   Date Value Ref Range Status   12/28/2021 5.15 (H) 0.70 - 1.30 MG/DL Final     Comment:     INVESTIGATED PER DELTA CHECK PROTOCOL        Meds Given   Name Dose Route   None ordered Adequacy / Fluid    Total Liters Process: 0L (UF only)   Net Fluid Removed: 2500ml      Comments   Time Out Done:   (Time) 1850   Admitting Diagnosis: GI Bleed   Consent obtained/signed: Informed Consent Verified: Yes (12/28/21 1853)   Machine / Ryan Shaffer # Machine Number: [id number] (12/28/21 1853)   Primary Nurse Rpt Pre: Sharon Johnson RN   Primary Nurse Rpt Post: Thiago Aguiar RN   Pt Education: Procedural   Care Plan: Ongoing   Pts outpatient clinic: [address]     Tx Summary   Arrived to pt room, pt intubated. Will nod appropriately in response to questions. Consent signed & on file. SBAR received from Primary RN. 1853: Pt cannulated with 20X needles per policy & without issue. Labs drawn per request/ order. VSS. Dialysis Tx initiated. 1920: Pt requesting pain medication. Primary RN aware. 2123: Tx ended. VSS. All possible blood returned to patient. Hemostasis achieved without issue. Bed locked and in the lowest position, call bell and belongings in reach. SBAR given to Primary, RN. Patient is stable at time of my departure. All Dialysis related medications have been reviewed.

## 2021-12-29 NOTE — PROGRESS NOTES
Nephrology Progress Note  Blaise Leblanc     [email or web address]  Phone - [phone]   Patient: Favio Lemus    YOB: 1975        Date- 12/29/2021   Admit Date: 12/26/2021  CC: Follow up for esrd          IMPRESSION & PLAN:   ·  esrd - home HD 5 days per week- Follows up [facility] Dr Meredith Bal [facility]  · Covid 19 infection  · Hypotension  · Acute blood loss anemia   · H/o esophageal bleed from esophagitis  · Left arm swelling- s/p angioplasty of left subclavian vein  · Gram positive sepsis from dental abcess  · s/p lead extraction at [facility]  · Anemia of ckd  · Hx of renal tx in past times 2.  · Hypertension  · CAD  · Failed RTX times 2  · H/o DVT, s/p ivc filter/ h/o HIT    · Hyperkalemia    PLAN-   Patient tolerated isolated ultrafiltration yesterday with 2.5 L taken off.  Plan for hemodialysis today with another 2 to 2.5 L ultrafiltration.  Patient is going to IR for TIPS procedure today   Continues to have swelling in the left upper extremity. Will ask vascular surgery consult (already notified).  .     Subjective: Interval History:   -Seen and examined in ICU   -Currently awake but intubated and on mechanical ventilation  -Going to IR today for TIPS procedure. Objective:   Vitals:    12/29/21 0700 12/29/21 0800 12/29/21 0853 12/29/21 0900   BP: 110/63 102/66  (!) 98/59   Pulse: 65 70 71 71   Resp: 16 24 15 16   Temp:  97.9 °F (36.6 °C)     TempSrc:       SpO2: 100% 99% 100% 100%   Weight:       Height:          12/28 0701 - 12/29 0700  In: 1960.1 [I.V.:1650.1]  Out: 2501   Last 3 Recorded Weights in this Encounter    12/26/21 0023 12/27/21 0700 12/28/21 0400   Weight: 61.2 kg (135 lb) 65.8 kg (145 lb 1 oz) 65.3 kg (143 lb 15.4 oz)      Physical exam:   GEN: NAD  NECK- Supple  RESP: no distress  NEURO:non focal  Patient examined through the glass door to minimize exposure to Covid and to preserve PPE      Chart reviewed. Pertinent Notes reviewed. Data Review :  Recent Labs     12/29/21  0355 12/28/21 2210 12/28/21 0349    137 138   K 5.7* 5.7* 4.5    101 101   CO2 27 29 30   BUN 39* 37* 29*   CREA 6.19* 5.81* 5.15*   * 148* 108*   CA 7.2* 7.3* 7.4*   MG 2.4 2.2 2.2   PHOS 6.5* 6.2* 4.2     Recent Labs     12/29/21  0355 12/28/21 2210 12/28/21  1653 12/28/21  0349 12/28/21  0349   WBC 14.8* 17.7*  --   --  8.4   HGB 9.1* 9.5* 8.5*   < > 6.6*   HCT 28.9* 30.3* 27.0*   < > 20.8*    170  --   --  160    < > = values in this interval not displayed. No results for input(s): FE, TIBC, PSAT, FERR in the last 72 hours.    Medication list  reviewed  Current Facility-Administered Medications   Medication Dose Route Frequency    propofol (DIPRIVAN) 10 mg/mL infusion  0-50 mcg/kg/min IntraVENous TITRATE    0.9% sodium chloride infusion 250 mL  250 mL IntraVENous PRN    balsam peru-castor oiL (VENELEX) ointment   Topical BID    chlorhexidine (PERIDEX) 0.12 % mouthwash 15 mL  15 mL Oral Q12H    fentaNYL (PF) 1,500 mcg/30 mL (50 mcg/mL) infusion  0-200 mcg/hr IntraVENous TITRATE    fentaNYL citrate (PF) injection 50 mcg  50 mcg IntraVENous Q4H PRN    oxyCODONE-acetaminophen (PERCOCET) 5-325 mg per tablet 1 Tablet  1 Tablet Oral Q4H PRN    sodium chloride (NS) flush 5-40 mL  5-40 mL IntraVENous Q8H    sodium chloride (NS) flush 5-40 mL  5-40 mL IntraVENous PRN    acetaminophen (TYLENOL) tablet 650 mg  650 mg Oral Q6H PRN    Or    acetaminophen (TYLENOL) suppository 650 mg  650 mg Rectal Q6H PRN    polyethylene glycol (MIRALAX) packet 17 g  17 g Oral DAILY PRN    ondansetron (ZOFRAN) injection 4 mg  4 mg IntraVENous Q6H PRN    octreotide (SANDOSTATIN) 500 mcg in 0.9% sodium chloride 500 mL infusion  50 mcg/hr IntraVENous CONTINUOUS    pantoprazole (PROTONIX) 40 mg in 0.9% sodium chloride 10 mL injection  40 mg IntraVENous Q12H    alcohol 62% (NOZIN) nasal  1 Ampule  1 Ampule Topical Q12H    NOREPINephrine (LEVOPHED) 8 mg in 5% dextrose 250mL (32 mcg/mL) infusion  0.5-16 mcg/min IntraVENous TITRATE    epoetin emilie-epbx (RETACRIT) injection 20,000 Units  20,000 Units SubCUTAneous Q MON, WED & [address], [place]              [facility]  [facility], [address]  [place], [address]  Phone - [phone]               Fax - [phone]

## 2021-12-29 NOTE — PROGRESS NOTES
Spiritual Care Assessment/Progress Note  [facility]      NAME: Stef Carty      MRN: [id number]  AGE: 42 y.o.  SEX: male  Jain Affiliation: Druze   Language: English     12/29/2021     Total Time (in minutes): 10     Spiritual Assessment begun in [facility] CRITICAL CARE 2 through conversation with:         [x]Patient        [] Family    [] Friend(s)        Reason for Consult: Initial/Spiritual assessment, critical care     Spiritual beliefs: (Please include comment if needed)     [x] Identifies with a brown tradition:         [] Supported by a brown community:            [] Claims no spiritual orientation:           [] Seeking spiritual identity:                [] Adheres to an individual form of spirituality:           [] Not able to assess:                           Identified resources for coping:      [] Prayer                               [] Music                  [] Guided Imagery     [x] Family/friends                 [] Pet visits     [] Devotional reading                         [] Unknown     [] Other:                                              Interventions offered during this visit: (See comments for more details)    Patient Interventions: Initial/Spiritual assessment, patient floor,Affirmation of brown,Iconic (affirming the presence of God/Higher Power),Coping skills reviewed/reinforced           Plan of Care:     [] Support spiritual and/or cultural needs    [] Support AMD and/or advance care planning process      [] Support grieving process   [] Coordinate Rites and/or Rituals    [] Coordination with community clergy   [] No spiritual needs identified at this time   [] Detailed Plan of Care below (See Comments)  [] Make referral to Music Therapy  [] Make referral to Pet Therapy     [] Make referral to Addiction services  [] Make referral to [facility]  [] Make referral to Spiritual Care Partner  [] No future visits requested        [x] Contact Spiritual Care for further referrals     Comments: Initial spiritual assessment with pt in 2524. Pt intubated but awake and alert going through his phone. Engaged with pt introducing self and role. Pt communicated back through hand motions. Was able to self-report to be coping well at this time although having a rough day yesterday. Friends and family have been visiting. Accepted blessing, identifies as [place]. Advised of availability of chaplains for support. Contact Spiritual Care for any further referrals.   Yovanny Lancaster M.Div, [facility]   Paging Service [phone])

## 2021-12-29 NOTE — PROGRESS NOTES
1900 Bedside shift change report received from [address] (offgoing nurse). Report included the following information SBAR, Kardex, ED Summary, Procedure Summary, Intake/Output, MAR, Accordion and Recent Results. 2000 Shift assessment completed, see flowsheets. Dialysis at bedside- not dialyzing, just pulling off 2.5 L of fluid    2138 As dialysis finished, HR dropped from low 100s to upper 30s. Precedex gtt stopped. 2140 Spoke to johanna about HR. Orders received. Will continue to monitor. [id number] HR irregular, varying from 40s to low 100s. EKG obtained and labs sent off. Will continue to monitor. 0000 Reassessment completed, see flowsheets. 0400 Morning labs drawn and sent to lab. Reassessment completed, see flowsheets. 0700 Shift change report given to Grant Dowling RN (oncoming nurse) by Francesca Weathers RN (offgoing nurse). Report included the following information SBAR, Kardex, ED Summary, Procedure Summary, Intake/Output, MAR and Recent Results.

## 2021-12-29 NOTE — PROCEDURES
[facility] / [phone]    Vitals Pre Post Assessment Pre Post   BP BP: 103/62 (12/29/21 1626) 98/60 LOC A&OX4 A&OX4   HR Pulse (Heart Rate): 63 (12/29/21 1626) 70 Lungs Clear, diminished bases Clear, diminished bases   Resp Resp Rate: 14 (12/29/21 1626) 17 Cardiac Bedside telemetry NSR per primary RN Bedside telemetry NSR per primary RN   Temp Temp: 97.9 °F (36.6 °C) (12/29/21 1626) 97.9 Skin Warm, and dry, L foot toes with wounds Warm, and dry, L foot toes with wounds   Weight  n/a n/a Edema SUZANNE, BLE trace SUZANNE, BLE trace   Tele status NSR per primary RN NSR per primary RN Pain 0 0     Orders   Duration: Start: 16:26 End: 19:57 Total: 3.5 hrs   Dialyzer: Dialyzer/Set Up Inspection: Hebert Victoria (12/29/21 1626)   K Bath: Dialysate K (mEq/L): 2 (12/29/21 1626)   Ca Bath: Dialysate CA (mEq/L): 3.0 (12/29/21 1626)   Na: Dialysate NA (mEq/L): 140 (12/29/21 1626)   Bicarb: Dialysate HCO3 (mEq/L): 40 (12/29/21 1626)   Target Fluid Removal: Goal/Amount of Fluid to Remove (mL): 2000 mL (12/29/21 1626)     Access   Type & Location: SUZANNE AVG   Comments: SUZANNE AVG: skin CDI. No s/s of infection. + B/T. No issues with cannulation or hemostasis. Running well at .                                         Labs   HBsAg (Antigen) / date: Negative 12/01/2021                                              HBsAb (Antibody) / date: Immune 11/11/2021   Source: Epic   Obtained/Reviewed  Critical Results Called HGB   Date Value Ref Range Status   12/29/2021 9.1 (L) 12.1 - 17.0 g/dL Final     Potassium   Date Value Ref Range Status   12/29/2021 5.7 (H) 3.5 - 5.1 mmol/L Final     Calcium   Date Value Ref Range Status   12/29/2021 7.2 (L) 8.5 - 10.1 MG/DL Final     BUN   Date Value Ref Range Status   12/29/2021 39 (H) 6 - 20 MG/DL Final     Creatinine   Date Value Ref Range Status   12/29/2021 6.19 (H) 0.70 - 1.30 MG/DL Final        Meds Given   Name Dose Route                    Adequacy / Fluid    Total Liters Process: 76.9L   Net Fluid Removed: 2000 ml      Comments   Time Out Done:   (Time) 16:20   Admitting Diagnosis: GI bleed   Consent obtained/signed: Informed Consent Verified: Yes (12/29/21 8801)   Machine / RO # Machine Number: [id number] (12/29/21 9272)   Primary Nurse Rpt Pre: Allen Nicole RN   Primary Nurse Rpt Post: Mary Carpenter RN   Pt Education: procedural   Care Plan: ongoing   Pts outpatient clinic: [address]     Tx Summary   Comments:   Arrived to patient room, set up and tested machine and water per policy. Patient is  A&Ox4. Consent signed & on file. SBAR received from Primary RN. 16:26- Pt cannulated with 03C needles per policy & without issue. VSS. Dialysis Tx initiated. Patient tolerated procedure well. 17:57- Tx ended. VSS. All possible blood returned to patient. Hemostasis achieved without issue. Bed locked and in the lowest position, call bell and belongings in reach. SBAR given to Primary, RN. Patient is stable at time of my departure. All Dialysis related medications have been reviewed.

## 2021-12-29 NOTE — PROGRESS NOTES
0730- Bedside shift change report given to [facility] and 2000 Don Pereira) by Fany Meredith (offgoing nurse). Report included the following information SBAR, Kardex, Intake/Output, MAR, Accordion and Recent Results. 0830- Propofol ordered for TIPs procedure this morning; Will start before he is transferred down. IR will call later when they are ready for the pt to come down. Consent signed and on the chart. 9912- Dr. Patrice Mayer at the bedside. Plan to consult Vascular Surg regarding increased swelling at fistula. Plan for dialysis after TIPs procedure. 1015- Spoke with IR they plan to do the TIPs at 12:30-1p today. 1- Interdisciplinary team rounds were held 12/29/2021 with the following team members:Care Management, Nursing, Nutrition, Pharmacy, Physical Therapy, Physician and Respiratory Therapy     1215-  IR called and said they cannot do to procedure because the pt was not been premedicated for the contrast. Premedication ordered for tomorrow. 1250- Orders from Dr. Tala Shirley to extubate, start clear liquid diet, d/c fentanyl drip. Vascular Surg at the bedside. 1315- Pt extubated and on 2L O2 via NC.     1415- Pt passed STAND and given clear liquid lunch tray. 1430- Got in touch with Dorcas, they plan to dialyze pt this afternoon. 1630- Pt being dialysis in the room. 1900- Bedside shift change report given to Fany Meredith (oncoming nurse) by [facility] (offgoing nurse). Report included the following information SBAR, Kardex, MAR, Accordion and Recent Results.

## 2021-12-29 NOTE — PROGRESS NOTES
ICU Progress Note        Subjective: Overnight events noted. Vital Signs:    Visit Vitals  /61   Pulse 72   Temp 97.9 °F (36.6 °C)   Resp 16   Ht 5' 7\" (1.702 m)   Wt 65.3 kg (143 lb 15.4 oz)   SpO2 100%   BMI 22.55 kg/m²       O2 Device: Endotracheal tube,Ventilator   O2 Flow Rate (L/min): 2 l/min   Temp (24hrs), Av °F (36.7 °C), Min:96.8 °F (36 °C), Max:98.6 °F (37 °C)       Intake/Output:   Last shift:       0701 -  190  In: 232.4 [I.V.:232.4]  Out: -   Last 3 shifts: 1901 -  0700  In: 2920.1 [P.O.:360; I.V.:2250.1]  Out: 4503     Intake/Output Summary (Last 24 hours) at 2021 1205  Last data filed at 2021 1100  Gross per 24 hour   Intake 2192.5 ml   Output 2500 ml   Net -307.5 ml       Ventilator Pressures  PIP Observed (cm H2O): 21 cm H2O  Plateau Pressure (cm H2O): 17 cm H2O  MAP (cm H2O): 8.4  PEEP/VENT (cm H2O): 5 cm H20  Auto PEEP Observed (cm H2O): 0.7 cm H2O    Physical Exam:    GEN: Intubated, sedated, exam limited by sedation. HEENT: anicteric sclerae, pink conj, ETT in place. NECK: Supple, -LAD, no neck mass, CVC in place with dressing C/D/I  CV: no murmurs noted. LUNGS: Coarse BS at bases, otherwise no wheezes, rhonchi, rales  ABD: soft, non-tender, no masses  EXT: No cyanosis, distal pulses palpable, no edema  SKIN: warm, dry and intact. NEURO: Sedated, exam is limited by sedation.       DATA:     Current Facility-Administered Medications   Medication Dose Route Frequency    propofol (DIPRIVAN) 10 mg/mL infusion  0-50 mcg/kg/min IntraVENous TITRATE    0.9% sodium chloride infusion 250 mL  250 mL IntraVENous PRN    balsam peru-castor oiL (VENELEX) ointment   Topical BID    chlorhexidine (PERIDEX) 0.12 % mouthwash 15 mL  15 mL Oral Q12H    fentaNYL (PF) 1,500 mcg/30 mL (50 mcg/mL) infusion  0-200 mcg/hr IntraVENous TITRATE    fentaNYL citrate (PF) injection 50 mcg  50 mcg IntraVENous Q4H PRN    oxyCODONE-acetaminophen (PERCOCET) 5-325 mg per tablet 1 Tablet  1 Tablet Oral Q4H PRN    sodium chloride (NS) flush 5-40 mL  5-40 mL IntraVENous Q8H    sodium chloride (NS) flush 5-40 mL  5-40 mL IntraVENous PRN    acetaminophen (TYLENOL) tablet 650 mg  650 mg Oral Q6H PRN    Or    acetaminophen (TYLENOL) suppository 650 mg  650 mg Rectal Q6H PRN    polyethylene glycol (MIRALAX) packet 17 g  17 g Oral DAILY PRN    ondansetron (ZOFRAN) injection 4 mg  4 mg IntraVENous Q6H PRN    octreotide (SANDOSTATIN) 500 mcg in 0.9% sodium chloride 500 mL infusion  50 mcg/hr IntraVENous CONTINUOUS    pantoprazole (PROTONIX) 40 mg in 0.9% sodium chloride 10 mL injection  40 mg IntraVENous Q12H    alcohol 62% (NOZIN) nasal  1 Ampule  1 Ampule Topical Q12H    NOREPINephrine (LEVOPHED) 8 mg in 5% dextrose 250mL (32 mcg/mL) infusion  0.5-16 mcg/min IntraVENous TITRATE    epoetin emilie-epbx (RETACRIT) injection 20,000 Units  20,000 Units SubCUTAneous Q MON, WED & FRI         Labs: Results:       Chemistry Recent Labs     12/29/21  0355 12/28/21 2210 12/28/21 0349   * 148* 108*    137 138   K 5.7* 5.7* 4.5    101 101   CO2 27 29 30   BUN 39* 37* 29*   CREA 6.19* 5.81* 5.15*   CA 7.2* 7.3* 7.4*   AGAP 7 7 7   BUCR 6* 6* 6*      CBC w/Diff Recent Labs     12/29/21  0355 12/28/21 2210 12/28/21  1653 12/28/21  0349 12/28/21 0349   WBC 14.8* 17.7*  --   --  8.4   RBC 2.92* 3.10*  --   --  2.17*   HGB 9.1* 9.5* 8.5*   < > 6.6*   HCT 28.9* 30.3* 27.0*   < > 20.8*    170  --   --  160    < > = values in this interval not displayed. Coagulation No results for input(s): PTP, INR, APTT, INREXT, INREXT in the last 72 hours. Liver Enzymes No results for input(s): TP, ALB, TBIL, AP in the last 72 hours.     No lab exists for component: SGOT, GPT, DBIL   ABG Lab Results   Component Value Date/Time    PHI 7.35 12/28/2021 10:46 PM    PCO2I 50.4 (H) 12/28/2021 10:46 PM    PO2I 188 (H) 12/28/2021 10:46 PM    HCO3I 27.9 (H) 12/28/2021 10:46 PM FIO2I 60 12/28/2021 10:46 PM      Microbiology No results for input(s): CULT in the last 72 hours. maging:  CXR Results  (Last 48 hours)    None          CT Results  (Last 48 hours)    None               Assessment and Plan:    Mari Blevins is a 42 y.o. male who has a PMH of ESRD on HD via fistula, recent COVID-19 infection, recent UGIB secondary to gastric varices and esophageal ulcerations c/b hemorrhagic shock, recent S intermedius bacteremia, recurrent DVT, IVC filter, HFrEF 35% s/p AICD, CAD, and severe pulmonary HTN who was recently discharged from [facility] on 12/24 following a prolonged hospital course for GIB secondary to GVs and esophageal ulcerations and also COVID-19 pneumonia. He had been taken off isolation prior to discharge. He presented to the ED on 12/26 reporting 4 episodes of bright red hematemesis and 1 episode of maroon stool. He also reported feeling \"weak and dizzy\". He experienced a syncopal episode in the ED upon standing. H/H 8.6/26.4    Acute GI bleed/Hemorrhagic shock -  He is known to have gastric varices, unfortunately not a candidate for endoscopic intervention (already had four EGD's). IR tried embolization procedure, failed. Cont. PPI and Octreotide. Monitor H/H and transfuse as needed for Hb >7 gm/dl. Cont. Levophed to keep MAP 65 and above. He came back intubated from IR suite (attempted embolization), IR is planning TIPS today. ESRD on HD - failed renal transplant. Cont. HD as per nephrology. HHrEF/Pulmonary hypertension - EF 35%. Has AICD. Cont. To monitor closely. Keep euvolemic. Hold beta blockers (Off pressors but BP still soft). History of  HIT - Avoid heparin. History of DVT - IVC filter. Hold anticoagulants due to GI bleed. Pain - Patient is on chronic pain medications (percocet at home). He has been asking for pain medications every 1 hour before intubation for abdominal pain. Cont. counseling. CCM time - 40 minutes.      Brigette Bal MD, FCCP, ATSF, FACP, DAPETERP  Interventional Pulmonology/Critical [address] [place] [address]

## 2021-12-29 NOTE — CONSULTS
Vascular Surgery Consult Note  12/29/2021    Subjective:     Irene Pearl a 67 M. o.  with a hx of ASHD w/ hx of cardiac stenting, HTN, HLD, HFrEF, ESRF on home HD, multiple DVTs, HIT, anemia, small bowel necrosis, and GERD.  He is a well-known patient of the practice. He is routinely under the care of Dr. Yola Tubbs. He is s/p creation of a RUE bovine AVG (now w/o flow) & LUE Richardson-Ryan AVG 9/2020.  He presented to [facility] in April of this year and was diagnosed bacteremia after an extensive evaluation including EVER, CT scan, and tag WBC scan were unremarkable he underwent exploration of his LUE AVG on 4/9/2021. Surgical cultures were negative. He was transferred to [facility] for evaluation of his AICD & S-ICD. A EVER there revealed a mobile echodensity 12 x 6 mm attached to the endocardial lead. 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 He continues to have a subcutaneous functioning S-ICD in place that was placed prior to his infection in 2019. While admitted at [facility] he was noted to have \"scattered ill-defined groundglass opacities within the right upper and lower lobes along with some scattered ill-defined nodular opacities. He was readmitted to [facility] in November of 2021 with streptococcus intermedius bacteremia thought to be secondary to multiple dental caries. He was discharged on a prolonged course of IV Vancomycin w/ a stop date of 12/5/2021. He was readmitted to the hospital on 12/19 => 12/24/2021 with hemorraghic shock secondary to an upper GIB related to esophageal and gastric varices. He is s/p clipping. He received a total of 13 units of pRBCs, 3 units Plasma, 2 Cryoprecipitate, 3 units Plts during this admission. His warfarin and ASA were discontinued. Plan was for repeat EGD prior to restarting. His hospital course was complicated by [id number] PNA.   He was discharge on 12/24 only to return to the hospital on 12/26 w/ hematemesis and hypotension. He underwent EGD on 12/26. He had multiple gastric ulcers that were bleeding that were injected and two additional clips were placed. He remains intubated post procedure w/ plan to extubate today. Plan is for TIPS in the am.  He continues to require vasopressor support. We have been asked to evaluate for left arm swelling. He has known central venous stenosis. He has not been well enough for definite revision and has been receiving intermittent fistulogram with angioplasty as management. Today his arm is significant swollen. The access has a palpable thrill. His left hand sensation and motion remains intact.       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 Dulce Maria Ledezma at [facility]   · Anemia of chronic disease   · Pulmonary hypertension  · Hypertension  · Hyperlipidemia  · Coronary artery disease              -stenting 2007              -[facility] 7/22/20 w/ patent stent and no significant disease  · HFrEF-41%  · Ventricular tachycardia   · Recurrent DVT LLE (2001, 2011, & 2012)              -s/p IVC 2011 followed by removal in 2014              -warfarin currently held for GIB beginning in 12/2021  · Left brachial vein DVT (2012)  · Right IJ thrombosis (2012)  · HIT  · Seizure disorder  · Gallstone pancreatitis  · Small bowel necrosis              -s/p resection  · Chronic pain syndrome with history of opioid use     Past procedural history in addition to above   · Single chamber ICD implanted in 2009 (RV lead 7120 Durata dual coil)              -with a generator change in 2016.               -RV lead broke 11/2019              -generator and lead were removed 4/2021 for MRSJOE epidermitis lead endocarditis  · S-ICD: [employer], model A219, Emblem MRI S-ICD (2019)  · Femoral bypass 2012  · Cholecystectomy  · Appendectomy   · Exploratory laparotomy  · Right upper extremity aVF  · Right upper extremity bovine aVG (11/2017)  · Left upper extremity aVG  · Exploration of left arm dialysis graft (4/2021)     Family history  COPD: Mother  Stomach cancer: Father  Cancer: Maternal grandmother     Social history  He is a non-smoker. He denies alcohol use     Home medications  Aspirin EC 81 mg daily-held for GIB  Lipitor 80 mg daily-dose decreased to 20 mg previous admission  Calcitriol 0.5 mcg daily   Zofran 4 mg every 8 hours as needed  Protonix 40 mg BID  Warfarin 2.5 mg daily-held for GIB  PhosLo 667 mg 2 caps 3 times daily  Percocet 5-325 mg 1 tablet every 4 hours as needed for pain  Propanolol 10 mg daily  Vitamin D2 1250 mcg (50,000 units) 1 capsule 3 times per week  Reglan 5 mg 1 tablet twice daily     Allergies  Shellfish: Anaphylaxis  Contrast dye: Anaphylaxis  Iodine: Anaphylaxis  Heparin: History of HIT      Review of Systems   Constitutional: Dizziness and generalized weakness. HENT: Negative for congestion. Eyes: Negative for visual disturbance. Respiratory: Negative for cough and shortness of breath. Cardiovascular: Negative for chest pain and leg swelling. Gastrointestinal: Negative for nausea and vomiting. Endocrine: Negative for polydipsia and polyuria. Genitourinary: Negative. Musculoskeletal: Positive for myalgias. Negative for gait problem. Skin: Negative for color change and wound.    Allergic/Immunologic: Negative for immunocompromised state. Neurological: Positive for weakness. Hematological: Negative. Psychiatric/Behavioral: Negative. Objective:     Patient Vitals for the past 24 hrs:   BP Temp Pulse Resp SpO2   12/29/21 1200 96/64 97.7 °F (36.5 °C) 71 16 100 %   12/29/21 1109 103/61 -- -- -- --   12/29/21 1101 -- -- 72 16 100 %   12/29/21 1100 (!) 98/54 -- 72 17 99 %   12/29/21 0900 (!) 98/59 -- 71 16 100 %   12/29/21 0853 -- -- 71 15 100 %   12/29/21 0800 102/66 97.9 °F (36.6 °C) 70 24 99 %   12/29/21 0700 110/63 -- 65 16 100 %   12/29/21 0400 97/60 98.6 °F (37 °C) 65 16 100 %   12/29/21 0327 -- -- 62 16 100 %   12/29/21 0300 99/68 -- 77 17 100 %   12/29/21 0200 103/65 -- 94 16 100 %   12/29/21 0100 115/70 -- (!) 117 16 99 %   12/29/21 0017 -- -- (!) 103 16 100 %   12/29/21 0000 113/70 97.6 °F (36.4 °C) 66 16 100 %   12/28/21 2300 111/67 -- (!) 51 16 100 %   12/28/21 2242 -- -- -- -- 100 %   12/28/21 2230 -- -- -- -- 100 %   12/28/21 2220 -- -- 96 16 100 %   12/28/21 2200 117/68 -- (!) 42 16 100 %   12/28/21 2125 115/77 -- (!) 48 16 100 %   12/28/21 2115 96/65 -- (!) 102 16 100 %   12/28/21 2103 100/66 -- (!) 105 16 100 %   12/28/21 2100 (!) 83/56 -- (!) 102 14 100 %   12/28/21 2045 92/61 -- (!) 103 16 100 %   12/28/21 2030 93/61 -- (!) 103 14 100 %   12/28/21 2015 106/69 -- (!) 103 16 99 %   12/28/21 2000 107/70 96.8 °F (36 °C) 99 16 99 %   12/28/21 1945 110/71 -- 98 14 99 %   12/28/21 1930 114/69 -- 99 15 100 %   12/28/21 1915 108/69 -- (!) 102 16 100 %   12/28/21 1900 98/61 -- (!) 103 16 100 %   12/28/21 1853 106/71 97.8 °F (36.6 °C) (!) 108 16 100 %   12/28/21 1634 -- -- 88 16 99 %   12/28/21 1625 (!) 101/59 -- 94 15 98 %        Physical Exam  Constitutional:       Interventions: He is intubated. Comments: Chronic appearing -American male who is intubated. He is alert and asking and answering questions appropriately. Appears older than stated age. HENT:      Head: Normocephalic.       Comments: Moon face      Nose: Nose normal.      Mouth/Throat:      Mouth: Mucous membranes are moist.   Eyes:      Pupils: Pupils are equal, round, and reactive to light. Cardiovascular:      Rate and Rhythm: Normal rate and regular rhythm. Pulses: Normal pulses. Comments: Left hand sensation and movement remained intact. Hand is warm. Weak but palpable radial pulse. Left AV graft has palpable thrill. 3+ edema of left upper extremity. Pulmonary:      Effort: Pulmonary effort is normal. No accessory muscle usage or respiratory distress. He is intubated. Abdominal:      General: Abdomen is flat. There is no distension. Musculoskeletal:         General: Normal range of motion. Cervical back: Normal range of motion. Skin:     Coloration: Skin is pale. Neurological:      Mental Status: He is alert. Mental status is at baseline. Pertinent Test Results:   Recent Results (from the past 24 hour(s))   HGB & HCT    Collection Time: 12/28/21  4:53 PM   Result Value Ref Range    HGB 8.5 (L) 12.1 - 17.0 g/dL    HCT 27.0 (L) 36.6 - 50.3 %   EKG, 12 LEAD, SUBSEQUENT    Collection Time: 12/28/21  9:52 PM   Result Value Ref Range    Ventricular Rate 42 BPM    Atrial Rate 42 BPM    P-R Interval 174 ms    QRS Duration 112 ms    Q-T Interval 700 ms    QTC Calculation (Bezet) 584 ms    Calculated P Axis 68 degrees    Calculated R Axis 82 degrees    Calculated T Axis 134 degrees    Diagnosis       Marked sinus bradycardia  Nonspecific ST and T wave abnormality  When compared with ECG of 26-DEC-2021 08:18,  Vent.  rate has decreased BY  32 BPM    Confirmed by Bina Bernstein MD, Tera Ross ([id number]) on 12/29/2021 8:33:41 AM     CBC W/O DIFF    Collection Time: 12/28/21 10:10 PM   Result Value Ref Range    WBC 17.7 (H) 4.1 - 11.1 K/uL    RBC 3.10 (L) 4.10 - 5.70 M/uL    HGB 9.5 (L) 12.1 - 17.0 g/dL    HCT 30.3 (L) 36.6 - 50.3 %    MCV 97.7 80.0 - 99.0 FL    MCH 30.6 26.0 - 34.0 PG    MCHC 31.4 30.0 - 36.5 g/dL    RDW 18.6 (H) 11.5 - 14.5 %    PLATELET 442 070 - 635 K/uL    MPV 10.6 8.9 - 12.9 FL    NRBC 0.1 (H) 0  WBC    ABSOLUTE NRBC 0.02 (H) 0.00 - 7.47 K/uL   METABOLIC PANEL, BASIC    Collection Time: 12/28/21 10:10 PM   Result Value Ref Range    Sodium 137 136 - 145 mmol/L    Potassium 5.7 (H) 3.5 - 5.1 mmol/L    Chloride 101 97 - 108 mmol/L    CO2 29 21 - 32 mmol/L    Anion gap 7 5 - 15 mmol/L    Glucose 148 (H) 65 - 100 mg/dL    BUN 37 (H) 6 - 20 MG/DL    Creatinine 5.81 (H) 0.70 - 1.30 MG/DL    BUN/Creatinine ratio 6 (L) 12 - 20      GFR est AA 13 (L) >60 ml/min/1.73m2    GFR est non-AA 11 (L) >60 ml/min/1.73m2    Calcium 7.3 (L) 8.5 - 10.1 MG/DL   MAGNESIUM    Collection Time: 12/28/21 10:10 PM   Result Value Ref Range    Magnesium 2.2 1.6 - 2.4 mg/dL   PHOSPHORUS    Collection Time: 12/28/21 10:10 PM   Result Value Ref Range    Phosphorus 6.2 (H) 2.6 - 4.7 MG/DL   POC G3 - PUL    Collection Time: 12/28/21 10:46 PM   Result Value Ref Range    FIO2 (POC) 60 %    pH (POC) 7.35 7.35 - 7.45      pCO2 (POC) 50.4 (H) 35.0 - 45.0 MMHG    pO2 (POC) 188 (H) 80 - 100 MMHG    HCO3 (POC) 27.9 (H) 22 - 26 MMOL/L    sO2 (POC) 99.6 (H) 92 - 97 %    Base excess (POC) 1.7 mmol/L    Site RIGHT BRACHIAL      Device: ADULT VENT      Mode Volume Control      Tidal volume 350 ml    Set Rate 16 bpm    PEEP/CPAP (POC) 5 cmH2O    Allens test (POC) Positive      Specimen type (POC) ARTERIAL     MAGNESIUM    Collection Time: 12/29/21  3:55 AM   Result Value Ref Range    Magnesium 2.4 1.6 - 2.4 mg/dL   PHOSPHORUS    Collection Time: 12/29/21  3:55 AM   Result Value Ref Range    Phosphorus 6.5 (H) 2.6 - 4.7 MG/DL   CBC W/O DIFF    Collection Time: 12/29/21  3:55 AM   Result Value Ref Range    WBC 14.8 (H) 4.1 - 11.1 K/uL    RBC 2.92 (L) 4.10 - 5.70 M/uL    HGB 9.1 (L) 12.1 - 17.0 g/dL    HCT 28.9 (L) 36.6 - 50.3 %    MCV 99.0 80.0 - 99.0 FL    MCH 31.2 26.0 - 34.0 PG    MCHC 31.5 30.0 - 36.5 g/dL    RDW 18.6 (H) 11.5 - 14.5 %    PLATELET 658 807 - 942 K/uL    MPV 11.9 8.9 - 12.9 FL    NRBC 0.2 (H) 0  WBC    ABSOLUTE NRBC 0.03 (H) 0.00 - 7.99 K/uL   METABOLIC PANEL, BASIC    Collection Time: 12/29/21  3:55 AM   Result Value Ref Range    Sodium 136 136 - 145 mmol/L    Potassium 5.7 (H) 3.5 - 5.1 mmol/L    Chloride 102 97 - 108 mmol/L    CO2 27 21 - 32 mmol/L    Anion gap 7 5 - 15 mmol/L    Glucose 133 (H) 65 - 100 mg/dL    BUN 39 (H) 6 - 20 MG/DL    Creatinine 6.19 (H) 0.70 - 1.30 MG/DL    BUN/Creatinine ratio 6 (L) 12 - 20      GFR est AA 12 (L) >60 ml/min/1.73m2    GFR est non-AA 10 (L) >60 ml/min/1.73m2    Calcium 7.2 (L) 8.5 - 10.1 MG/DL       Assessmen/Plan:     Chronic left arm central venous stenosis s/p aVG    -has been to unwell for access revision    -s/p multiple fistulogram/BAPs last in 11/2021  · We will plan for fistulogram on Friday. Access can be used. If nonfunctional in the interim place temporary dialysis catheter. Hemorrhagic shock  -Hypotension persists. Patient continues to receive vasopressor support. Anemia of acute blood loss with underlying anemia of chronic disease  Recurrent upper gastrointestinal hemorrhage  Portal hypertension  Esophageal and gastric varices  -Status post recent injection and clipping x4.  -on Octreotide and PPI  · Plan per gastroenterology/IR. · TIPS planned for in the a.m. Acute hypoxic respiratory failure  Mechanical intubation 12/26 => plan to extubate later today  Pulmonary hypertension   Personal history of COVID-19 pneumonia    End-stage renal failure  -Home dialysis 5 times per week   Hyperkalemia     History of hypertension  Hyperlipidemia  Coronary artery disease  HFrEF-41%  History of ventricular tachycardia    Seizure disorder    Chronic pain syndrome with history of opioid use    VTE Prophylaxis:  History of multiple thrombotic events including left lower extremity, left brachial, and right IJ DVTs. -History of IVC filter placement that has since been removed.   History of HIT  · SCDs  · Warfarin currently contraindicated due to life-threatening GI bleed    Disposition: To be determined. Condition serious.     Signed By: Benito Workman NP     December 29, 2021

## 2021-12-30 ENCOUNTER — HOSPITAL ENCOUNTER (OUTPATIENT)
Dept: INTERVENTIONAL RADIOLOGY/VASCULAR | Age: 46
Discharge: HOME | DRG: 270 | End: 2021-12-30
Attending: INTERNAL MEDICINE
Payer: MEDICARE

## 2021-12-30 ENCOUNTER — APPOINTMENT (OUTPATIENT)
Dept: VASCULAR SURGERY | Age: 46
DRG: 270 | End: 2021-12-30
Attending: NURSE PRACTITIONER
Payer: MEDICARE

## 2021-12-30 VITALS
SYSTOLIC BLOOD PRESSURE: 138 MMHG | DIASTOLIC BLOOD PRESSURE: 82 MMHG | OXYGEN SATURATION: 100 % | TEMPERATURE: 98.4 F | HEART RATE: 75 BPM | RESPIRATION RATE: 12 BRPM

## 2021-12-30 LAB
ANION GAP SERPL CALC-SCNC: 10 MMOL/L (ref 5–15)
BUN SERPL-MCNC: 23 MG/DL (ref 6–20)
BUN/CREAT SERPL: 5 (ref 12–20)
CALCIUM SERPL-MCNC: 7.4 MG/DL (ref 8.5–10.1)
CHLORIDE SERPL-SCNC: 98 MMOL/L (ref 97–108)
CO2 SERPL-SCNC: 28 MMOL/L (ref 21–32)
CREAT SERPL-MCNC: 4.49 MG/DL (ref 0.7–1.3)
ERYTHROCYTE [DISTWIDTH] IN BLOOD BY AUTOMATED COUNT: 19.1 % (ref 11.5–14.5)
GLUCOSE SERPL-MCNC: 156 MG/DL (ref 65–100)
HCT VFR BLD AUTO: 27.1 % (ref 36.6–50.3)
HGB BLD-MCNC: 8.5 G/DL (ref 12.1–17)
MAGNESIUM SERPL-MCNC: 2.4 MG/DL (ref 1.6–2.4)
MCH RBC QN AUTO: 31.8 PG (ref 26–34)
MCHC RBC AUTO-ENTMCNC: 31.4 G/DL (ref 30–36.5)
MCV RBC AUTO: 101.5 FL (ref 80–99)
NRBC # BLD: 0.04 K/UL (ref 0–0.01)
NRBC BLD-RTO: 0.4 PER 100 WBC
PHOSPHATE SERPL-MCNC: 5.1 MG/DL (ref 2.6–4.7)
PLATELET # BLD AUTO: 133 K/UL (ref 150–400)
PMV BLD AUTO: 12 FL (ref 8.9–12.9)
POTASSIUM SERPL-SCNC: 4.7 MMOL/L (ref 3.5–5.1)
RBC # BLD AUTO: 2.67 M/UL (ref 4.1–5.7)
SODIUM SERPL-SCNC: 136 MMOL/L (ref 136–145)
WBC # BLD AUTO: 9.4 K/UL (ref 4.1–11.1)

## 2021-12-30 PROCEDURE — C1769 GUIDE WIRE: HCPCS

## 2021-12-30 PROCEDURE — 2709999900 HC NON-CHARGEABLE SUPPLY

## 2021-12-30 PROCEDURE — 74011250636 HC RX REV CODE- 250/636: Performed by: STUDENT IN AN ORGANIZED HEALTH CARE EDUCATION/TRAINING PROGRAM

## 2021-12-30 PROCEDURE — 74011636637 HC RX REV CODE- 636/637: Performed by: NURSE PRACTITIONER

## 2021-12-30 PROCEDURE — 77030008684 HC TU ET CUF COVD -B: Performed by: ANESTHESIOLOGY

## 2021-12-30 PROCEDURE — 36415 COLL VENOUS BLD VENIPUNCTURE: CPT

## 2021-12-30 PROCEDURE — 65610000006 HC RM INTENSIVE CARE

## 2021-12-30 PROCEDURE — 77030019908 HC STETH ESOPH SIMS -A: Performed by: ANESTHESIOLOGY

## 2021-12-30 PROCEDURE — 74011636637 HC RX REV CODE- 636/637: Performed by: INTERNAL MEDICINE

## 2021-12-30 PROCEDURE — C1894 INTRO/SHEATH, NON-LASER: HCPCS

## 2021-12-30 PROCEDURE — 74011250636 HC RX REV CODE- 250/636: Performed by: INTERNAL MEDICINE

## 2021-12-30 PROCEDURE — 76060000036 HC ANESTHESIA 2.5 TO 3 HR

## 2021-12-30 PROCEDURE — 74011636637 HC RX REV CODE- 636/637: Performed by: STUDENT IN AN ORGANIZED HEALTH CARE EDUCATION/TRAINING PROGRAM

## 2021-12-30 PROCEDURE — 77030010324 HC TBNG CNTRST MRTM -A

## 2021-12-30 PROCEDURE — 85027 COMPLETE CBC AUTOMATED: CPT

## 2021-12-30 PROCEDURE — 77030014109 HC TRNSDUC SP PROC MRTM -B

## 2021-12-30 PROCEDURE — 74011000636 HC RX REV CODE- 636: Performed by: STUDENT IN AN ORGANIZED HEALTH CARE EDUCATION/TRAINING PROGRAM

## 2021-12-30 PROCEDURE — 93926 LOWER EXTREMITY STUDY: CPT

## 2021-12-30 PROCEDURE — B51T1ZZ FLUOROSCOPY OF PORTAL AND SPLANCHNIC VEINS USING LOW OSMOLAR CONTRAST: ICD-10-PCS | Performed by: STUDENT IN AN ORGANIZED HEALTH CARE EDUCATION/TRAINING PROGRAM

## 2021-12-30 PROCEDURE — 77010033678 HC OXYGEN DAILY

## 2021-12-30 PROCEDURE — 37182 INSERT HEPATIC SHUNT (TIPS): CPT

## 2021-12-30 PROCEDURE — 84100 ASSAY OF PHOSPHORUS: CPT

## 2021-12-30 PROCEDURE — C1892 INTRO/SHEATH,FIXED,PEEL-AWAY: HCPCS

## 2021-12-30 PROCEDURE — 77030026438 HC STYL ET INTUB CARD -A: Performed by: ANESTHESIOLOGY

## 2021-12-30 PROCEDURE — 77030013522 HC DEV INFL BLN MRTM -B

## 2021-12-30 PROCEDURE — 74011000250 HC RX REV CODE- 250: Performed by: NURSE PRACTITIONER

## 2021-12-30 PROCEDURE — C9113 INJ PANTOPRAZOLE SODIUM, VIA: HCPCS | Performed by: NURSE PRACTITIONER

## 2021-12-30 PROCEDURE — 74011250636 HC RX REV CODE- 250/636: Performed by: NURSE ANESTHETIST, CERTIFIED REGISTERED

## 2021-12-30 PROCEDURE — 80048 BASIC METABOLIC PNL TOTAL CA: CPT

## 2021-12-30 PROCEDURE — 74011250636 HC RX REV CODE- 250/636: Performed by: NURSE PRACTITIONER

## 2021-12-30 PROCEDURE — 93922 UPR/L XTREMITY ART 2 LEVELS: CPT

## 2021-12-30 PROCEDURE — 74011250637 HC RX REV CODE- 250/637: Performed by: NURSE PRACTITIONER

## 2021-12-30 PROCEDURE — 83735 ASSAY OF MAGNESIUM: CPT

## 2021-12-30 PROCEDURE — 74011000250 HC RX REV CODE- 250: Performed by: NURSE ANESTHETIST, CERTIFIED REGISTERED

## 2021-12-30 PROCEDURE — 77030011229 HC DIL VESL COON COOK -A

## 2021-12-30 PROCEDURE — 74011250637 HC RX REV CODE- 250/637: Performed by: INTERNAL MEDICINE

## 2021-12-30 PROCEDURE — 74011000250 HC RX REV CODE- 250: Performed by: STUDENT IN AN ORGANIZED HEALTH CARE EDUCATION/TRAINING PROGRAM

## 2021-12-30 RX ORDER — FENTANYL CITRATE 50 UG/ML
INJECTION, SOLUTION INTRAMUSCULAR; INTRAVENOUS AS NEEDED
Status: DISCONTINUED | OUTPATIENT
Start: 2021-12-30 | End: 2021-12-30 | Stop reason: HOSPADM

## 2021-12-30 RX ORDER — SODIUM CHLORIDE 9 MG/ML
INJECTION, SOLUTION INTRAVENOUS
Status: DISCONTINUED | OUTPATIENT
Start: 2021-12-30 | End: 2021-12-30 | Stop reason: HOSPADM

## 2021-12-30 RX ORDER — PHENYLEPHRINE HYDROCHLORIDE 10 MG/ML
INJECTION INTRAVENOUS AS NEEDED
Status: DISCONTINUED | OUTPATIENT
Start: 2021-12-30 | End: 2021-12-30 | Stop reason: HOSPADM

## 2021-12-30 RX ORDER — CEFAZOLIN SODIUM 1 G/3ML
INJECTION, POWDER, FOR SOLUTION INTRAMUSCULAR; INTRAVENOUS
Status: DISCONTINUED
Start: 2021-12-30 | End: 2021-12-31 | Stop reason: HOSPADM

## 2021-12-30 RX ORDER — HEPARIN SODIUM 200 [USP'U]/100ML
800 INJECTION, SOLUTION INTRAVENOUS ONCE
Status: COMPLETED | OUTPATIENT
Start: 2021-12-30 | End: 2021-12-30

## 2021-12-30 RX ORDER — MIDAZOLAM HYDROCHLORIDE 1 MG/ML
INJECTION, SOLUTION INTRAMUSCULAR; INTRAVENOUS AS NEEDED
Status: DISCONTINUED | OUTPATIENT
Start: 2021-12-30 | End: 2021-12-30 | Stop reason: HOSPADM

## 2021-12-30 RX ORDER — CEFAZOLIN SODIUM/WATER 2 G/20 ML
SYRINGE (ML) INTRAVENOUS AS NEEDED
Status: DISCONTINUED | OUTPATIENT
Start: 2021-12-30 | End: 2021-12-30 | Stop reason: HOSPADM

## 2021-12-30 RX ORDER — DIPHENHYDRAMINE HYDROCHLORIDE 50 MG/ML
INJECTION, SOLUTION INTRAMUSCULAR; INTRAVENOUS AS NEEDED
Status: DISCONTINUED | OUTPATIENT
Start: 2021-12-30 | End: 2021-12-30 | Stop reason: HOSPADM

## 2021-12-30 RX ORDER — ONDANSETRON 2 MG/ML
INJECTION INTRAMUSCULAR; INTRAVENOUS AS NEEDED
Status: DISCONTINUED | OUTPATIENT
Start: 2021-12-30 | End: 2021-12-30 | Stop reason: HOSPADM

## 2021-12-30 RX ORDER — WATER FOR INJECTION,STERILE
VIAL (ML) INJECTION
Status: DISCONTINUED
Start: 2021-12-30 | End: 2021-12-31 | Stop reason: HOSPADM

## 2021-12-30 RX ORDER — SODIUM CHLORIDE 9 MG/ML
INJECTION, SOLUTION INTRAVENOUS
Status: DISCONTINUED | OUTPATIENT
Start: 2021-12-30 | End: 2021-12-30

## 2021-12-30 RX ORDER — ETOMIDATE 2 MG/ML
INJECTION INTRAVENOUS AS NEEDED
Status: DISCONTINUED | OUTPATIENT
Start: 2021-12-30 | End: 2021-12-30 | Stop reason: HOSPADM

## 2021-12-30 RX ORDER — LIDOCAINE HYDROCHLORIDE 20 MG/ML
INJECTION, SOLUTION EPIDURAL; INFILTRATION; INTRACAUDAL; PERINEURAL AS NEEDED
Status: DISCONTINUED | OUTPATIENT
Start: 2021-12-30 | End: 2021-12-30 | Stop reason: HOSPADM

## 2021-12-30 RX ORDER — LIDOCAINE HYDROCHLORIDE 20 MG/ML
18 INJECTION, SOLUTION INFILTRATION; PERINEURAL ONCE
Status: COMPLETED | OUTPATIENT
Start: 2021-12-30 | End: 2021-12-30

## 2021-12-30 RX ORDER — FENTANYL CITRATE 50 UG/ML
50 INJECTION, SOLUTION INTRAMUSCULAR; INTRAVENOUS
Status: DISCONTINUED | OUTPATIENT
Start: 2021-12-30 | End: 2022-01-04

## 2021-12-30 RX ORDER — SUCCINYLCHOLINE CHLORIDE 20 MG/ML
INJECTION INTRAMUSCULAR; INTRAVENOUS AS NEEDED
Status: DISCONTINUED | OUTPATIENT
Start: 2021-12-30 | End: 2021-12-30 | Stop reason: HOSPADM

## 2021-12-30 RX ADMIN — Medication 2 G: at 14:07

## 2021-12-30 RX ADMIN — Medication: at 09:00

## 2021-12-30 RX ADMIN — FENTANYL CITRATE 50 MCG: 50 INJECTION INTRAMUSCULAR; INTRAVENOUS at 01:43

## 2021-12-30 RX ADMIN — PHENYLEPHRINE HYDROCHLORIDE 40 MCG: 10 INJECTION INTRAVENOUS at 13:51

## 2021-12-30 RX ADMIN — FENTANYL CITRATE 50 MCG: 0.05 INJECTION, SOLUTION INTRAMUSCULAR; INTRAVENOUS at 16:33

## 2021-12-30 RX ADMIN — SUCCINYLCHOLINE CHLORIDE 140 MG: 20 INJECTION, SOLUTION INTRAMUSCULAR; INTRAVENOUS at 13:36

## 2021-12-30 RX ADMIN — FENTANYL CITRATE 50 MCG: 0.05 INJECTION, SOLUTION INTRAMUSCULAR; INTRAVENOUS at 20:24

## 2021-12-30 RX ADMIN — FENTANYL CITRATE 50 MCG: 50 INJECTION INTRAMUSCULAR; INTRAVENOUS at 06:29

## 2021-12-30 RX ADMIN — MIDAZOLAM 1 MG: 1 INJECTION INTRAMUSCULAR; INTRAVENOUS at 13:29

## 2021-12-30 RX ADMIN — Medication: at 17:23

## 2021-12-30 RX ADMIN — LIDOCAINE HYDROCHLORIDE 360 MG: 20 INJECTION, SOLUTION INFILTRATION; PERINEURAL at 14:00

## 2021-12-30 RX ADMIN — FENTANYL CITRATE 50 MCG: 50 INJECTION INTRAMUSCULAR; INTRAVENOUS at 08:43

## 2021-12-30 RX ADMIN — SODIUM CHLORIDE, PRESERVATIVE FREE 10 ML: 5 INJECTION INTRAVENOUS at 22:36

## 2021-12-30 RX ADMIN — ALUMINUM HYDROXIDE, MAGNESIUM HYDROXIDE, AND SIMETHICONE 30 ML: 200; 200; 20 SUSPENSION ORAL at 04:09

## 2021-12-30 RX ADMIN — SODIUM CHLORIDE: 0.9 INJECTION, SOLUTION INTRAVENOUS at 13:29

## 2021-12-30 RX ADMIN — FENTANYL CITRATE 10 MCG: 50 INJECTION, SOLUTION INTRAMUSCULAR; INTRAVENOUS at 13:29

## 2021-12-30 RX ADMIN — HEPARIN SODIUM 800 UNITS: 200 INJECTION, SOLUTION INTRAVENOUS at 14:00

## 2021-12-30 RX ADMIN — OXYCODONE AND ACETAMINOPHEN 1 TABLET: 5; 325 TABLET ORAL at 18:34

## 2021-12-30 RX ADMIN — FENTANYL CITRATE 50 MCG: 0.05 INJECTION, SOLUTION INTRAMUSCULAR; INTRAVENOUS at 12:05

## 2021-12-30 RX ADMIN — PREDNISONE 50 MG: 5 TABLET ORAL at 12:01

## 2021-12-30 RX ADMIN — FENTANYL CITRATE 20 MCG: 50 INJECTION, SOLUTION INTRAMUSCULAR; INTRAVENOUS at 15:04

## 2021-12-30 RX ADMIN — OCTREOTIDE ACETATE 50 MCG/HR: 500 INJECTION, SOLUTION INTRAVENOUS; SUBCUTANEOUS at 08:46

## 2021-12-30 RX ADMIN — PREDNISONE 50 MG: 20 TABLET ORAL at 08:42

## 2021-12-30 RX ADMIN — ONDANSETRON HYDROCHLORIDE 4 MG: 2 INJECTION, SOLUTION INTRAMUSCULAR; INTRAVENOUS at 15:57

## 2021-12-30 RX ADMIN — ETOMIDATE 18 MCG: 2 INJECTION, SOLUTION INTRAVENOUS at 13:36

## 2021-12-30 RX ADMIN — OCTREOTIDE ACETATE 50 MCG/HR: 500 INJECTION, SOLUTION INTRAVENOUS; SUBCUTANEOUS at 21:02

## 2021-12-30 RX ADMIN — EPOETIN ALFA-EPBX 20000 UNITS: 20000 INJECTION, SOLUTION INTRAVENOUS; SUBCUTANEOUS at 00:00

## 2021-12-30 RX ADMIN — SODIUM CHLORIDE, PRESERVATIVE FREE 10 ML: 5 INJECTION INTRAVENOUS at 16:40

## 2021-12-30 RX ADMIN — Medication 1 AMPULE: at 20:26

## 2021-12-30 RX ADMIN — PHENYLEPHRINE HYDROCHLORIDE 40 MCG: 10 INJECTION INTRAVENOUS at 14:18

## 2021-12-30 RX ADMIN — FENTANYL CITRATE 50 MCG: 50 INJECTION INTRAMUSCULAR; INTRAVENOUS at 04:09

## 2021-12-30 RX ADMIN — DIPHENHYDRAMINE HYDROCHLORIDE 12.5 MG: 50 INJECTION, SOLUTION INTRAMUSCULAR; INTRAVENOUS at 13:38

## 2021-12-30 RX ADMIN — OXYCODONE AND ACETAMINOPHEN 1 TABLET: 5; 325 TABLET ORAL at 11:09

## 2021-12-30 RX ADMIN — SODIUM CHLORIDE 40 MG: 9 INJECTION, SOLUTION INTRAMUSCULAR; INTRAVENOUS; SUBCUTANEOUS at 08:43

## 2021-12-30 RX ADMIN — Medication 1 AMPULE: at 09:00

## 2021-12-30 RX ADMIN — FENTANYL CITRATE 40 MCG: 50 INJECTION, SOLUTION INTRAMUSCULAR; INTRAVENOUS at 13:36

## 2021-12-30 RX ADMIN — LIDOCAINE HYDROCHLORIDE 100 MG: 20 INJECTION, SOLUTION INTRAVENOUS at 13:36

## 2021-12-30 RX ADMIN — IOPAMIDOL 96 ML: 755 INJECTION, SOLUTION INTRAVENOUS at 14:00

## 2021-12-30 RX ADMIN — ALUMINUM HYDROXIDE, MAGNESIUM HYDROXIDE, AND SIMETHICONE 30 ML: 200; 200; 20 SUSPENSION ORAL at 21:02

## 2021-12-30 RX ADMIN — OXYCODONE AND ACETAMINOPHEN 1 TABLET: 5; 325 TABLET ORAL at 22:36

## 2021-12-30 RX ADMIN — SODIUM CHLORIDE 40 MG: 9 INJECTION, SOLUTION INTRAMUSCULAR; INTRAVENOUS; SUBCUTANEOUS at 20:25

## 2021-12-30 RX ADMIN — SODIUM CHLORIDE, PRESERVATIVE FREE 10 ML: 5 INJECTION INTRAVENOUS at 06:29

## 2021-12-30 RX ADMIN — PREDNISONE 50 MG: 20 TABLET ORAL at 00:46

## 2021-12-30 RX ADMIN — PHENYLEPHRINE HYDROCHLORIDE 40 MCG: 10 INJECTION INTRAVENOUS at 14:01

## 2021-12-30 NOTE — ROUTINE PROCESS
1605-TIPS procedure aborted and not completed due to pressures taken during procedure do not show  the need for stenting. Pressures recorded as:  Right artrial (1),  Wedge Pressure (2), and direct portal pressure of (5). Refer to Dr. Jonathan Cui post procedure note for further information. 1615-Pt transferred via bed to room 2524 accompanied by this nurse and TROY To with cardiac monitor and oxygen via NRB mask at 10 LPM.  1620-Bedside report given regarding procedure to CCU nurse and PACU nurse in room.

## 2021-12-30 NOTE — PROGRESS NOTES
1900- Bedside shift report received from Mike Freitas RN and HANDY Fulton RN    2000- Shift assessment complete, pt resting comfortably in bed. A&O x 4 and able to follow commands appropriately. Currently Normal Sinus with stable blood pressure on 5 mcg/min of Levophed. States pain 6/10, repositioned and will give PRN Fentanyl per order. See MAR and Flowsheets for more details. 2138- PRN Fentanyl given for pain, see flowsheets for more details. 2150- Pt asking to receive PRN Fentanyl every 2 hrs. Spoke with Greta Moreau NP, new orders noted. 2341- PRN Fentanyl given for pain, see flowsheets for more details. 0000-Reassessment complete, pt resting more comfortably in bed. No new changes noted from previous assessment. Slight purulent drainage noted weeping from previous central line site, pressure dressing reapplied. Current central line dressing changed, site clean, dry, and intact. See flowsheets for more details. 0143- PRN Fentanyl given for pain, see flowsheets for more details. 0400- Reassessment complete, pt resting comfortably in bed. No changes noted from previous assessment, see flowsheets for more details. 0409- PRN Fentanyl given for pain, see flowsheets for more details. 2224- PRN Fentanyl given for pain, see flowsheets for more details.      0700- Bedside shift report given to Aure Muñoz

## 2021-12-30 NOTE — PROGRESS NOTES
ICU Progress Note        Subjective: Overnight events noted. Vital Signs:    Visit Vitals  /75   Pulse 60   Temp 97.9 °F (36.6 °C)   Resp 17   Ht 5' 7\" (1.702 m)   Wt 65.3 kg (143 lb 15.4 oz)   SpO2 97%   BMI 22.55 kg/m²       O2 Device: None (Room air)   O2 Flow Rate (L/min): 3 l/min   Temp (24hrs), Av °F (36.7 °C), Min:97.7 °F (36.5 °C), Max:98.8 °F (37.1 °C)       Intake/Output:   Last shift:      No intake/output data recorded. Last 3 shifts:  1901 -  0700  In: 3110.7 [P.O.:1040; I.V.:2070.7]  Out: 4500     Intake/Output Summary (Last 24 hours) at 2021 1026  Last data filed at 2021 0700  Gross per 24 hour   Intake 2244.03 ml   Output 2000 ml   Net 244.03 ml       Ventilator Pressures  PIP Observed (cm H2O): 21 cm H2O  Plateau Pressure (cm H2O): 17 cm H2O  MAP (cm H2O): 8.4  PEEP/VENT (cm H2O): 5 cm H20  Auto PEEP Observed (cm H2O): 0.7 cm H2O    Physical Exam:    GEN: Sitting comfortably on the bed. HEENT: anicteric sclerae, pink conj  NECK: Supple, -LAD, no neck mass, CVC in place with dressing C/D/I  CV: no murmurs noted. LUNGS: Coarse BS at bases, otherwise no wheezes, rhonchi, rales  ABD: soft, non-tender, no masses  EXT: No cyanosis, distal pulses palpable, + edema  SKIN: warm, dry and intact. NEURO: Alert, awake and oriented x 3.       DATA:     Current Facility-Administered Medications   Medication Dose Route Frequency    predniSONE (DELTASONE) tablet 50 mg  50 mg Oral ONCE    alum-mag hydroxide-simeth (MYLANTA) oral suspension 30 mL  30 mL Oral Q4H PRN    fentaNYL citrate (PF) injection 50 mcg  50 mcg IntraVENous Q2H PRN    0.9% sodium chloride infusion 250 mL  250 mL IntraVENous PRN    balsam peru-castor oiL (VENELEX) ointment   Topical BID    oxyCODONE-acetaminophen (PERCOCET) 5-325 mg per tablet 1 Tablet  1 Tablet Oral Q4H PRN    sodium chloride (NS) flush 5-40 mL  5-40 mL IntraVENous Q8H    sodium chloride (NS) flush 5-40 mL  5-40 mL IntraVENous PRN    acetaminophen (TYLENOL) tablet 650 mg  650 mg Oral Q6H PRN    Or    acetaminophen (TYLENOL) suppository 650 mg  650 mg Rectal Q6H PRN    polyethylene glycol (MIRALAX) packet 17 g  17 g Oral DAILY PRN    ondansetron (ZOFRAN) injection 4 mg  4 mg IntraVENous Q6H PRN    octreotide (SANDOSTATIN) 500 mcg in 0.9% sodium chloride 500 mL infusion  50 mcg/hr IntraVENous CONTINUOUS    pantoprazole (PROTONIX) 40 mg in 0.9% sodium chloride 10 mL injection  40 mg IntraVENous Q12H    alcohol 62% (NOZIN) nasal  1 Ampule  1 Ampule Topical Q12H    NOREPINephrine (LEVOPHED) 8 mg in 5% dextrose 250mL (32 mcg/mL) infusion  0.5-16 mcg/min IntraVENous TITRATE    epoetin emilie-epbx (RETACRIT) injection 20,000 Units  20,000 Units SubCUTAneous Q MON, WED & FRI         Labs: Results:       Chemistry Recent Labs     12/30/21  0420 12/29/21  0355 12/28/21  2210   * 133* 148*    136 137   K 4.7 5.7* 5.7*   CL 98 102 101   CO2 28 27 29   BUN 23* 39* 37*   CREA 4.49* 6.19* 5.81*   CA 7.4* 7.2* 7.3*   AGAP 10 7 7   BUCR 5* 6* 6*      CBC w/Diff Recent Labs     12/30/21  0420 12/29/21  0355 12/28/21  2210   WBC 9.4 14.8* 17.7*   RBC 2.67* 2.92* 3.10*   HGB 8.5* 9.1* 9.5*   HCT 27.1* 28.9* 30.3*   * 183 170      Coagulation No results for input(s): PTP, INR, APTT, INREXT, INREXT in the last 72 hours. Liver Enzymes No results for input(s): TP, ALB, TBIL, AP in the last 72 hours. No lab exists for component: SGOT, GPT, DBIL   ABG No results found for: PH, PHI, PCO2, PCO2I, PO2, PO2I, HCO3, HCO3I, FIO2, FIO2I   Microbiology No results for input(s): CULT in the last 72 hours.      maging:  CXR Results  (Last 48 hours)    None          CT Results  (Last 48 hours)    None               Assessment and Plan:    Genoveva Bridges is a 42 y.o. male who has a PMH of ESRD on HD via fistula, recent COVID-19 infection, recent UGIB secondary to gastric varices and esophageal ulcerations c/b hemorrhagic shock, recent S intermedius bacteremia, recurrent DVT, IVC filter, HFrEF 35% s/p AICD, CAD, and severe pulmonary HTN who was recently discharged from ED [facility] on 12/24 following a prolonged hospital course for GIB secondary to GVs and esophageal ulcerations and also COVID-19 pneumonia. He had been taken off isolation prior to discharge. He presented to the ED on 12/26 reporting 4 episodes of bright red hematemesis and 1 episode of maroon stool. He also reported feeling \"weak and dizzy\". He experienced a syncopal episode in the ED upon standing. H/H 8.6/26.4    Acute GI bleed/Hemorrhagic shock -  He is known to have gastric varices, unfortunately not a candidate for endoscopic intervention (already had four EGD's). IR tried embolization procedure, failed. Cont. PPI and Octreotide. Monitor H/H and transfuse as needed for Hb >7 gm/dl. IR is planning TIPS today. On levophed 1 mcg/min. ESRD on HD - failed renal transplant. Cont. HD as per nephrology. HHrEF/Pulmonary hypertension - EF 35%. Has AICD. Cont. To monitor closely. Keep euvolemic. Hold beta blockers (Off pressors but BP still soft). History of  HIT - Avoid heparin. History of DVT - IVC filter. Hold anticoagulants due to GI bleed. Pain - Patient is on chronic pain medications (percocet at home). He has been asking for pain medications every 1 hour before intubation for abdominal pain. Cont. counseling. [facility] time - 35 minutes.      Joan Duvall MD, FCCP, ATSF, FACP, DAABIP  Interventional Pulmonology/Critical [address] [place] [address]

## 2021-12-30 NOTE — ANESTHESIA POSTPROCEDURE EVALUATION
* No procedures listed *.    general    Anesthesia Post Evaluation      Multimodal analgesia: multimodal analgesia used between 6 hours prior to anesthesia start to PACU discharge  Patient location during evaluation: ICU  Patient participation: complete - patient participated  Level of consciousness: awake and alert  Pain management: adequate  Airway patency: patent  Anesthetic complications: no  Cardiovascular status: acceptable, hemodynamically stable and blood pressure returned to baseline  Respiratory status: acceptable and nasal cannula  Hydration status: euvolemic  Post anesthesia nausea and vomiting:  none  Final Post Anesthesia Temperature Assessment:  Normothermia (36.0-37.5 degrees C)      INITIAL Post-op Vital signs:   Vitals Value Taken Time   /74 12/30/21 1645   Temp     Pulse 65 12/30/21 1645   Resp 9 12/30/21 1645   SpO2 100 % 12/30/21 1645

## 2021-12-30 NOTE — PROGRESS NOTES
Nephrology Progress Note  Blaise Leblanc     www. [facility]"[employer]  Phone - [phone]   Patient: Harsha Birch    YOB: 1975        Date- 12/30/2021   Admit Date: 12/26/2021  CC: Follow up for esrd          IMPRESSION & PLAN:   ·  esrd - home HD 5 days per week- Follows up with Dr Gisela Batista at [facility]  · Covid 19 infection  · Hypotension  · Acute blood loss anemia   · H/o esophageal bleed from esophagitis  · Left arm swelling- s/p angioplasty of left subclavian vein  · Gram positive sepsis from dental abcess  · s/p lead extraction at [facility]  · Anemia of ckd  · Hx of renal tx in past times 2.  · Hypertension  · CAD  · Failed RTX times 2  · H/o DVT, s/p ivc filter/ h/o HIT    · Hyperkalemia    PLAN-   Plan for hemodialysis tomorrow   Patient is going to IR for TIPS procedure today   Plan for fistulogram tomorrow per vascular surgery we will coordinate with him about the timing with dialysis.  Transfuse for hemoglobin less than 7, will continue EPO with dialysis     Subjective: Interval History:   -Seen and examined in ICU   -Currently awake and extubated  -Going to IR today for TIPS procedure. Objective:   Vitals:    12/30/21 0300 12/30/21 0400 12/30/21 0500 12/30/21 0700   BP: 111/62 112/68 (!) 96/59    Pulse: 66 64 61 (!) 59   Resp: 17 15 16 14   Temp:  97.9 °F (36.6 °C)     TempSrc:       SpO2: 98% 97% 99% 97%   Weight:       Height:          12/29 0701 - 12/30 0700  In: 2418.3 [P.O.:1040; I.V.:1378.3]  Out: 2000   Last 3 Recorded Weights in this Encounter    12/26/21 0023 12/27/21 0700 12/28/21 0400   Weight: 61.2 kg (135 lb) 65.8 kg (145 lb 1 oz) 65.3 kg (143 lb 15.4 oz)      Physical exam:   GEN: NAD  NECK- Supple  RESP: no distress  NEURO:non focal  Patient examined through the glass door to minimize exposure to Covid and to preserve PPE      Chart reviewed. Pertinent Notes reviewed.      Data Review :  Recent Labs     12/30/21  0420 12/29/21  6799 12/28/21  2210    136 137   K 4.7 5.7* 5.7*   CL 98 102 101   CO2 28 27 29   BUN 23* 39* 37*   CREA 4.49* 6.19* 5.81*   * 133* 148*   CA 7.4* 7.2* 7.3*   MG 2.4 2.4 2.2   PHOS 5.1* 6.5* 6.2*     Recent Labs     12/30/21  0420 12/29/21  0355 12/28/21  2210   WBC 9.4 14.8* 17.7*   HGB 8.5* 9.1* 9.5*   HCT 27.1* 28.9* 30.3*   * 183 170     No results for input(s): FE, TIBC, PSAT, FERR in the last 72 hours.    Medication list  reviewed  Current Facility-Administered Medications   Medication Dose Route Frequency    predniSONE (DELTASONE) tablet 50 mg  50 mg Oral ONCE    alum-mag hydroxide-simeth (MYLANTA) oral suspension 30 mL  30 mL Oral Q4H PRN    fentaNYL citrate (PF) injection 50 mcg  50 mcg IntraVENous Q2H PRN    0.9% sodium chloride infusion 250 mL  250 mL IntraVENous PRN    balsam peru-castor oiL (VENELEX) ointment   Topical BID    oxyCODONE-acetaminophen (PERCOCET) 5-325 mg per tablet 1 Tablet  1 Tablet Oral Q4H PRN    sodium chloride (NS) flush 5-40 mL  5-40 mL IntraVENous Q8H    sodium chloride (NS) flush 5-40 mL  5-40 mL IntraVENous PRN    acetaminophen (TYLENOL) tablet 650 mg  650 mg Oral Q6H PRN    Or    acetaminophen (TYLENOL) suppository 650 mg  650 mg Rectal Q6H PRN    polyethylene glycol (MIRALAX) packet 17 g  17 g Oral DAILY PRN    ondansetron (ZOFRAN) injection 4 mg  4 mg IntraVENous Q6H PRN    octreotide (SANDOSTATIN) 500 mcg in 0.9% sodium chloride 500 mL infusion  50 mcg/hr IntraVENous CONTINUOUS    pantoprazole (PROTONIX) 40 mg in 0.9% sodium chloride 10 mL injection  40 mg IntraVENous Q12H    alcohol 62% (NOZIN) nasal  1 Ampule  1 Ampule Topical Q12H    NOREPINephrine (LEVOPHED) 8 mg in 5% dextrose 250mL (32 mcg/mL) infusion  0.5-16 mcg/min IntraVENous TITRATE    epoetin emilie-epbx (RETACRIT) injection 20,000 Units  20,000 Units SubCUTAneous Q MON, WED & Cesar Domingo MD              [facility]  [facility] / [address], [address]  [address] [facility] [address], [address]  Phone - [phone]               Fax - [phone]

## 2021-12-30 NOTE — PERIOP NOTES
1622- Accompanied pt to CCU with CRNA and IR nurse. Pt placed on the monitor and assessed by primary RN Asaf. Pt on 3L nasal cannula, no pressor support, and is orientedx4.    1639- Primary RN Asaf at bedside. Pt remains stable. PACU care complete.

## 2021-12-30 NOTE — PROGRESS NOTES
Problem: Falls - Risk of  Goal: *Absence of Falls  Description: Document Lashae Nap Fall Risk and appropriate interventions in the flowsheet. Outcome: Progressing Towards Goal  Note: Fall Risk Interventions:  Mobility Interventions: Bed/chair exit alarm,Communicate number of staff needed for ambulation/transfer,Patient to call before getting OOB,Strengthening exercises (ROM-active/passive)    Medication Interventions: Bed/chair exit alarm,Evaluate medications/consider consulting pharmacy,Patient to call before getting OOB    Elimination Interventions: Bed/chair exit alarm,Call light in reach,Toileting schedule/hourly rounds,Patient to call for help with toileting needs    History of Falls Interventions: Bed/chair exit alarm,Evaluate medications/consider consulting pharmacy,Room close to nurse's station     Problem: Patient Education: Go to Patient Education Activity  Goal: Patient/Family Education  Outcome: Progressing Towards Goal     Problem: Pressure Injury - Risk of  Goal: *Prevention of pressure injury  Description: Document Yong Scale and appropriate interventions in the flowsheet. Outcome: Progressing Towards Goal  Note: Pressure Injury Interventions:  Sensory Interventions: Assess changes in LOC,Assess need for specialty bed,Check visual cues for pain,Discuss PT/OT consult with provider,Keep linens dry and wrinkle-free,Maintain/enhance activity level,Minimize linen layers,Turn and reposition approx.  every two hours (pillows and wedges if needed)    Moisture Interventions: Apply protective barrier, creams and emollients,Check for incontinence Q2 hours and as needed,Maintain skin hydration (lotion/cream),Assess need for specialty bed,Absorbent underpads    Activity Interventions: Assess need for specialty bed,Pressure redistribution bed/mattress(bed type)    Mobility Interventions: Assess need for specialty bed,Float heels,HOB 30 degrees or less,Pressure redistribution bed/mattress (bed type),PT/OT evaluation,Turn and reposition approx. every two hours(pillow and wedges)    Nutrition Interventions: Document food/fluid/supplement intake,Discuss nutritional consult with provider    Friction and Shear Interventions: Apply protective barrier, creams and emollients,Feet elevated on foot rest,Foam dressings/transparent film/skin sealants,HOB 30 degrees or less,Minimize layers     Problem: Patient Education: Go to Patient Education Activity  Goal: Patient/Family Education  Outcome: Progressing Towards Goal     Problem: Airway Clearance - Ineffective  Goal: Achieve or maintain patent airway  Outcome: Progressing Towards Goal     Problem: Gas Exchange - Impaired  Goal: Absence of hypoxia  Outcome: Progressing Towards Goal  Goal: Promote optimal lung function  Outcome: Progressing Towards Goal     Problem: Breathing Pattern - Ineffective  Goal: Ability to achieve and maintain a regular respiratory rate  Outcome: Progressing Towards Goal     Problem:  Body Temperature -  Risk of, Imbalanced  Goal: Ability to maintain a body temperature within defined limits  Outcome: Progressing Towards Goal  Goal: Will regain or maintain usual level of consciousness  Outcome: Progressing Towards Goal  Goal: Complications related to the disease process, condition or treatment will be avoided or minimized  Outcome: Progressing Towards Goal     Problem: Isolation Precautions - Risk of Spread of Infection  Goal: Prevent transmission of infectious organism to others  Outcome: Progressing Towards Goal     Problem: Nutrition Deficits  Goal: Optimize nutrtional status  Outcome: Progressing Towards Goal     Problem: Risk for Fluid Volume Deficit  Goal: Maintain normal heart rhythm  Outcome: Progressing Towards Goal  Goal: Maintain absence of muscle cramping  Outcome: Progressing Towards Goal  Goal: Maintain normal serum potassium, sodium, calcium, phosphorus, and pH  Outcome: Progressing Towards Goal     Problem: Loneliness or Risk for Loneliness  Goal: Demonstrate positive use of time alone when socialization is not possible  Outcome: Progressing Towards Goal     Problem: Fatigue  Goal: Verbalize increase energy and improved vitality  Outcome: Progressing Towards Goal     Problem: Patient Education: Go to Patient Education Activity  Goal: Patient/Family Education  Outcome: Progressing Towards Goal     Problem: Non-Violent Restraints  Goal: Removal from restraints as soon as assessed to be safe  Outcome: Progressing Towards Goal  Goal: No harm/injury to patient while restraints in use  Outcome: Progressing Towards Goal  Goal: Patient's dignity will be maintained  Outcome: Progressing Towards Goal  Goal: Patient Interventions  Outcome: Progressing Towards Goal     Problem: Ventilator Management  Goal: *Adequate oxygenation and ventilation  Outcome: Progressing Towards Goal  Goal: *Patient maintains clear airway/free of aspiration  Outcome: Progressing Towards Goal  Goal: *Absence of infection signs and symptoms  Outcome: Progressing Towards Goal  Goal: *Normal spontaneous ventilation  Outcome: Progressing Towards Goal     Problem: Patient Education: Go to Patient Education Activity  Goal: Patient/Family Education  Outcome: Progressing Towards Goal

## 2021-12-30 NOTE — H&P
Interventional and Vascular Radiology History and Physical    Patient: Timothy Hernandez 42 y.o. male       Chief Complaint: gastric variceal bleeding     History of Present Illness: 42year old male with refractory gastric variceal bleeding presents for direct portal pressure measure measurements, portal venography, with possible TIPS and variceal embolization. History:    Past Medical History:   Diagnosis Date    Abdominal hematoma     AICD (automatic cardioverter/defibrillator) present     CAD (coronary artery disease)     anterior MI s/p 2 stents  2007    Chronic abdominal pain     Chronic kidney disease     ESRD; Dialysis dependent.  Home [facility] does labs- [facility] tpke    DVT (deep venous thrombosis) ([address] 2001    DVT of popliteal vein ([facility] 11/2011    not anticoagulated due to bleeding, IVC filter    Endocarditis     Gallstone pancreatitis     Gastrointestinal disorder     acid reflux    Gastrointestinal disorder     peptic ulcer    Hemodialysis patient ([address]     High cholesterol     Hypertension     Kidney transplant     b/l kidneys 1995, 2001    Long term current use of anticoagulant therapy     Nephrotic syndrome     Other ill-defined conditions(799.89)     kidny transplant x2,  on dialysis    Other ill-defined conditions(799.89)     home dialysis    Other ill-defined conditions(799.89)     hx recurrent left leg DVT    Peritonitis ([address]     Seizures ([address] 2015    most recent- only had three in lifetime    Small bowel obstruction (HCC)     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    V-tach ([facility]      Family History   Problem Relation Age of Onset    COPD Mother     Lung Disease Mother     Cancer Father         stomach    Cancer Maternal Grandmother      Social History     Socioeconomic History    Marital status: SINGLE     Spouse name: Not on file    Number of children: Not on file    Years of education: Not on file    Highest education level: Not on file   Occupational History    Not on file   Tobacco Use    Smoking status: Never Smoker    Smokeless tobacco: Never Used   Substance and Sexual Activity    Alcohol use: No    Drug use: Yes     Types: Prescription, OTC    Sexual activity: Not on file   Other Topics Concern    Not on file   Social History Narrative    Not on file     Social Determinants of Health     Financial Resource Strain:     Difficulty of Paying Living Expenses: Not on file   Food Insecurity:     Worried About Running Out of Food in the Last Year: Not on file    Marleny of Food in the Last Year: Not on file   Transportation Needs:     Lack of Transportation (Medical): Not on file    Lack of Transportation (Non-Medical): Not on file   Physical Activity:     Days of Exercise per Week: Not on file    Minutes of Exercise per Session: Not on file   Stress:     Feeling of Stress : Not on file   Social Connections:     Frequency of Communication with Friends and Family: Not on file    Frequency of Social Gatherings with Friends and Family: Not on file    Attends [employer] Services: Not on file    Active Member of [address] [place] [address] or Organizations: Not on file    Attends Club or Organization Meetings: Not on file    Marital Status: Not on file   Intimate Partner Violence:     Fear of Current or Ex-Partner: Not on file    Emotionally Abused: Not on file    Physically Abused: Not on file    Sexually Abused: Not on file   Housing Stability:     Unable to Pay for Housing in the Last Year: Not on file    Number of [place] in the Last Year: Not on file    Unstable Housing in the Last Year: Not on file       Allergies:    Allergies   Allergen Reactions    Shellfish Containing Products Swelling     Break out in rash, trouble breathing    Contrast Agent [Iodine] Shortness of Breath    Heparin Analogues Other (comments)     Positive history of HIT       Current Medications:  No current facility-administered medications for this encounter. No current outpatient medications on file. Facility-Administered Medications Ordered in Other Encounters   Medication Dose Route Frequency    fentaNYL citrate (PF) injection 50 mcg  50 mcg IntraVENous Q4H PRN    alum-mag hydroxide-simeth (MYLANTA) oral suspension 30 mL  30 mL Oral Q4H PRN    0.9% sodium chloride infusion 250 mL  250 mL IntraVENous PRN    balsam peru-castor oiL (VENELEX) ointment   Topical BID    oxyCODONE-acetaminophen (PERCOCET) 5-325 mg per tablet 1 Tablet  1 Tablet Oral Q4H PRN    sodium chloride (NS) flush 5-40 mL  5-40 mL IntraVENous Q8H    sodium chloride (NS) flush 5-40 mL  5-40 mL IntraVENous PRN    acetaminophen (TYLENOL) tablet 650 mg  650 mg Oral Q6H PRN    Or    acetaminophen (TYLENOL) suppository 650 mg  650 mg Rectal Q6H PRN    polyethylene glycol (MIRALAX) packet 17 g  17 g Oral DAILY PRN    ondansetron (ZOFRAN) injection 4 mg  4 mg IntraVENous Q6H PRN    octreotide (SANDOSTATIN) 500 mcg in 0.9% sodium chloride 500 mL infusion  50 mcg/hr IntraVENous CONTINUOUS    pantoprazole (PROTONIX) 40 mg in 0.9% sodium chloride 10 mL injection  40 mg IntraVENous Q12H    alcohol 62% (NOZIN) nasal  1 Ampule  1 Ampule Topical Q12H    NOREPINephrine (LEVOPHED) 8 mg in 5% dextrose 250mL (32 mcg/mL) infusion  0.5-16 mcg/min IntraVENous TITRATE    epoetin emilie-epbx (RETACRIT) injection 20,000 Units  20,000 Units SubCUTAneous Q MON, WED & FRI        Physical Exam:  There were no vitals taken for this visit.   No acute distress  Good peripheral perfusion  Nonlabored respirations  Abdomen nondistended  Procedure site right neck and chest without abnormalities       Alerts:    Hospital Problems  Date Reviewed: 12/30/2021    None          Laboratory:      Recent Labs     12/30/21  0420   HGB 8.5*   HCT 27.1*   WBC 9.4   *   BUN 23*   CREA 4.49*   K 4.7         Plan of Care/Planned Procedure:  Risks, benefits, and alternatives reviewed with patient and he agrees to proceed with the procedure.        Iris Way MD

## 2021-12-30 NOTE — PROGRESS NOTES
GI Progress Note   Ryanne Meadows NP  NAME:Claude Sherryl Reed :1975 DUW:[id number]   Prim GI: Tasha Martinez MD  PCP: Mana Montez MD  Date/Time:  2021 10:15 AM   Assessment:   · Recurrent severe hematemesis  · Massive PRBC's transfusion since 21  · Anemia, post-hemorrhagic  · Has had now four EGD's most recently on  with bleeding GV and severe portal hypertensive gastropathy  · Severe pulmonary HTN  · ESRD on HD     Plan:   · After discussion with IR, TIPS procedure is the best coarse of action. Plan for TIPS this AM with IR. Unable to completed yesterday d/t patient not being premedicated for allergy to contrast  · Continue Octreotide  · Continue PPI  · Monitor for s/s of bleeding; transfuse as clinically indicated  · Serial H&H; goal for hgb >7.0  · Symptomatic care per primary team   *Plan of care discussed with Dr. Concepcion Dudley      Subjective:   Patient resting in bed. No longer intubated. Patient without complaints at this time. Updated on plan of care. Verbalized understanding    Review of Systems:  Symptom Y/N Comments  Symptom Y/N Comments   Fever/Chills n   Chest Pain n    Cough n   Headaches n    Sputum n   Joint Pain n    SOB/ZAMUDIO n   Pruritis/Rash n    Tolerating Diet n NPO  Other       Could NOT obtain due to:      Objective:   VITALS:   Last 24hrs VS reviewed since prior progress note.  Most recent are:  Visit Vitals  /73   Pulse 64   Temp 97.9 °F (36.6 °C)   Resp 23   Ht 5' 7\" (1.702 m)   Wt 65.3 kg (143 lb 15.4 oz)   SpO2 97%   BMI 22.55 kg/m²       Intake/Output Summary (Last 24 hours) at 2021 1015  Last data filed at 2021 0700  Gross per 24 hour   Intake 2244.03 ml   Output 2000 ml   Net 244.03 ml     PHYSICAL EXAM:  VSSAF  Gen: NAD  Abd: soft, NT    Lab and Radiology Data Reviewed: (see below)    Medications Reviewed: (see below)  PMH/SH reviewed - no change compared to H&P  ________________________________________________________________________  Care Plan discussed with:  Patient x   Family     RN x              Consultant:       Sabine Solorzano NP     Procedures: see electronic medical records for all procedures/Xrays and details which were not copied into this note but were reviewed prior to creation of Plan. LABS:  Recent Labs     12/30/21 0420 12/29/21  0355   WBC 9.4 14.8*   HGB 8.5* 9.1*   HCT 27.1* 28.9*   * 183     Recent Labs     12/30/21 0420 12/29/21 0355 12/28/21  2210    136 137   K 4.7 5.7* 5.7*   CL 98 102 101   CO2 28 27 29   BUN 23* 39* 37*   CREA 4.49* 6.19* 5.81*   * 133* 148*   CA 7.4* 7.2* 7.3*   MG 2.4 2.4 2.2   PHOS 5.1* 6.5* 6.2*     No results for input(s): AP, TBIL, TP, ALB, GLOB, GGT, AML, LPSE in the last 72 hours. No lab exists for component: SGOT, GPT, AMYP, HLPSE  No results for input(s): INR, PTP, APTT, INREXT, INREXT in the last 72 hours. No results for input(s): FE, TIBC, PSAT, FERR in the last 72 hours. Lab Results   Component Value Date/Time    Folate 7.5 11/23/2011 11:26 AM     No results for input(s): PH, PCO2, PO2 in the last 72 hours. No results for input(s): CPK, CKMB in the last 72 hours.     No lab exists for component: TROPONINI  Lab Results   Component Value Date/Time    Color RED 11/16/2012 05:15 PM    Appearance OPAQUE 11/16/2012 05:15 PM    Specific gravity 1.020 11/16/2012 05:15 PM    Specific gravity 1.011 09/17/2012 01:35 AM    pH (UA) 8.0 11/16/2012 05:15 PM    Protein >300 (A) 11/16/2012 05:15 PM    Glucose 100 (A) 11/16/2012 05:15 PM    Ketone NEGATIVE  11/16/2012 05:15 PM    Bilirubin NEGATIVE  09/17/2012 01:35 AM    Urobilinogen 0.2 11/16/2012 05:15 PM    Nitrites NEGATIVE  11/16/2012 05:15 PM    Leukocyte Esterase NEGATIVE  11/16/2012 05:15 PM    Epithelial cells 5-10 11/16/2012 05:15 PM    Bacteria 3+ (A) 11/16/2012 05:15 PM    WBC >100 (H) 11/16/2012 05:15 PM    RBC >100 (H) 11/16/2012 05:15 PM       MEDICATIONS:  Current Facility-Administered Medications   Medication Dose Route Frequency    predniSONE (DELTASONE) tablet 50 mg  50 mg Oral ONCE    alum-mag hydroxide-simeth (MYLANTA) oral suspension 30 mL  30 mL Oral Q4H PRN    fentaNYL citrate (PF) injection 50 mcg  50 mcg IntraVENous Q2H PRN    0.9% sodium chloride infusion 250 mL  250 mL IntraVENous PRN    balsam peru-castor oiL (VENELEX) ointment   Topical BID    oxyCODONE-acetaminophen (PERCOCET) 5-325 mg per tablet 1 Tablet  1 Tablet Oral Q4H PRN    sodium chloride (NS) flush 5-40 mL  5-40 mL IntraVENous Q8H    sodium chloride (NS) flush 5-40 mL  5-40 mL IntraVENous PRN    acetaminophen (TYLENOL) tablet 650 mg  650 mg Oral Q6H PRN    Or    acetaminophen (TYLENOL) suppository 650 mg  650 mg Rectal Q6H PRN    polyethylene glycol (MIRALAX) packet 17 g  17 g Oral DAILY PRN    ondansetron (ZOFRAN) injection 4 mg  4 mg IntraVENous Q6H PRN    octreotide (SANDOSTATIN) 500 mcg in 0.9% sodium chloride 500 mL infusion  50 mcg/hr IntraVENous CONTINUOUS    pantoprazole (PROTONIX) 40 mg in 0.9% sodium chloride 10 mL injection  40 mg IntraVENous Q12H    alcohol 62% (NOZIN) nasal  1 Ampule  1 Ampule Topical Q12H    NOREPINephrine (LEVOPHED) 8 mg in 5% dextrose 250mL (32 mcg/mL) infusion  0.5-16 mcg/min IntraVENous TITRATE    epoetin emilie-epbx (RETACRIT) injection 20,000 Units  20,000 Units SubCUTAneous Q MON, WED & FRI

## 2021-12-30 NOTE — ROUTINE PROCESS
1245-Received care of pt from CCU room 2524 via bed to radiology recovery for TIPS procedure, pt accompanied by CCU nurse and report received. Being prepped for procedure. 1305-Dr. Symone Gale into speak with pt and procedure explained along with risks and benefits; consent obtained for TIPS procedure. Anesthesia Dr. Cornelia Meyer into speak with pt and anesthesia selection explained along with risks and benefits; consent obtained for general anesthesia.

## 2021-12-30 NOTE — PROGRESS NOTES
0700: Bedside and verbal report received from [address] [place] [address]. Report included SBAR, MAR, Kardex, I/Os, Recent Results, and Cardiac Rhythm. 6671: Patient having 7/10 abdominal pain. PRN Fentanyl given. IR called and plan will be to take patient for TIPS procedure around 1215. Additional 50mg of prednisone will be given before going to IR.    1018: Levo titrated to 1mcg/min. 1033: Levophed gtt stopped. 1109: Patient having 5/10 abdominal pain. PRN Percocet given. PRN Fentanyl order changed from Q2hr to Q4hr. 1245: Patient taken to IR for TIPS procedure. Report given to radiology nurse. 1625: Patient transported back to CCU via bed. TIPS procedure aborted. Levophed turned off due to normotension with MAP >65. Patient SpO2 100% on 3 L nasal cannula. Patient is alert and oriented x4. Patient has Left subclavian incision covered with gauze and tegaderm along with needle stick site on Right abdomen. 1737: Per Nursing Supervisor she will reach out to Vascular  to complete PETER and Duplex. 1900: Report give to Alvina BILLINGS (oncoming nurse). Report included SBAR, MAR, Kardex, I/Os, Recent Results, and Cardiac Rhythm.      Mike Christine RN

## 2021-12-30 NOTE — PROCEDURES
Interventional Radiology  Procedure Note        12/30/2021 9:41 AM    Patient: Rashaun Vazquez     Diagnosis: Refractory Gastric Bleeding    Procedure(s):   1. Left subclavian 10F sheath access. 2. Atrial, hepatic vein, and portal measurements. 3. Percutaneous transhepatic left portal vein access with fluoroscopic portography. Specimens removed: None    Informed Consent: Obtained    Sedation: GA    Complications: None    Assessment  Left subclavian vein access due to complex venous stenoses. Direct atrial, hepatic, and portal pressures of 1, 2, and 5 mmHg for portal gradient of 3 mmhg. Percutaneous transhepatic portal access with portography showed brisk antegrade flow with no identifiable varices after interrogating both splenic and superior mesenteric pathways. Plan:  - return to inpatient monitoring   - if patient re-bleeds, may need to consider non-portal sources of bleeding as conglomerate of invasive studies show no portal hypertension or significant gastric varices.    - please contact VIR for questions/concerns  - full dictation to follow      -------------------------------  Ludwin Pena MD  Vascular and Interventional Radiology

## 2021-12-30 NOTE — PROGRESS NOTES
Vascular studies (PETER and duplex) were not able to be performed this afternoon due to patient having a procedure done and was off the floor. MANUELITO Ron informed to call the on call vascular tech to perform the studies when patient is back in the room from the procedure today.     Karin Sosa, EDDAT  Vascular

## 2021-12-30 NOTE — ANESTHESIA PREPROCEDURE EVALUATION
Relevant Problems   RESPIRATORY SYSTEM   (+) Pneumonia      CARDIOVASCULAR   (+) CAD (coronary artery disease)   (+) Congestive heart failure, unspecified   (+) Coronary atherosclerosis of native coronary artery   (+) HTN (hypertension)      RENAL FAILURE   (+) ESRD (end stage renal disease) (HCC)   (+) ESRD (end stage renal disease) on dialysis (HCC)      HEMATOLOGY   (+) Anemia       Anesthetic History     PONV          Review of Systems / Medical History  Patient summary reviewed, nursing notes reviewed and pertinent labs reviewed    Pulmonary  Within defined limits                 Neuro/Psych     seizures         Cardiovascular    Hypertension      CHF  Dysrhythmias   Pacemaker, CAD and hyperlipidemia    Exercise tolerance: <4 METS  Comments: AICD (automatic cardioverter/defibrillator) present  Hx V-tach  anterior MI s/p 2 stents (2007)     GI/Hepatic/Renal         Renal disease: ESRD      Comments: S/p Kidney transplant Endo/Other        Anemia     Other Findings   Comments: Hb 8.5  Acute COVID-19  Hx Peritonitis  Portal hypertension with GI bleed - recurrent  Hx Abdominal hematoma  Thrombocytopenia   Hx DVT           Physical Exam    Airway  Mallampati: III  TM Distance: 4 - 6 cm  Neck ROM: normal range of motion   Mouth opening: Normal     Cardiovascular  Regular rate and rhythm,  S1 and S2 normal,  no murmur, click, rub, or gallop  Rhythm: regular  Rate: normal         Dental  No notable dental hx       Pulmonary  Breath sounds clear to auscultation               Abdominal  GI exam deferred       Other Findings            Anesthetic Plan    ASA: 4  Anesthesia type: general    Monitoring Plan: BIS      Induction: Intravenous  Anesthetic plan and risks discussed with: Patient

## 2021-12-31 LAB
ANION GAP SERPL CALC-SCNC: 7 MMOL/L (ref 5–15)
BUN SERPL-MCNC: 31 MG/DL (ref 6–20)
BUN/CREAT SERPL: 6 (ref 12–20)
CALCIUM SERPL-MCNC: 7.2 MG/DL (ref 8.5–10.1)
CHLORIDE SERPL-SCNC: 99 MMOL/L (ref 97–108)
CO2 SERPL-SCNC: 29 MMOL/L (ref 21–32)
CREAT SERPL-MCNC: 5.58 MG/DL (ref 0.7–1.3)
ERYTHROCYTE [DISTWIDTH] IN BLOOD BY AUTOMATED COUNT: 19.2 % (ref 11.5–14.5)
GLUCOSE SERPL-MCNC: 140 MG/DL (ref 65–100)
HCT VFR BLD AUTO: 27.8 % (ref 36.6–50.3)
HGB BLD-MCNC: 8.5 G/DL (ref 12.1–17)
HGB BLD-MCNC: 9.2 G/DL (ref 12.1–17)
MAGNESIUM SERPL-MCNC: 2.4 MG/DL (ref 1.6–2.4)
MCH RBC QN AUTO: 30.8 PG (ref 26–34)
MCHC RBC AUTO-ENTMCNC: 30.6 G/DL (ref 30–36.5)
MCV RBC AUTO: 100.7 FL (ref 80–99)
NRBC # BLD: 0.02 K/UL (ref 0–0.01)
NRBC BLD-RTO: 0.2 PER 100 WBC
PHOSPHATE SERPL-MCNC: 5.8 MG/DL (ref 2.6–4.7)
PLATELET # BLD AUTO: 117 K/UL (ref 150–400)
PMV BLD AUTO: 11.7 FL (ref 8.9–12.9)
POTASSIUM SERPL-SCNC: 5.1 MMOL/L (ref 3.5–5.1)
RBC # BLD AUTO: 2.76 M/UL (ref 4.1–5.7)
SODIUM SERPL-SCNC: 135 MMOL/L (ref 136–145)
WBC # BLD AUTO: 10 K/UL (ref 4.1–11.1)

## 2021-12-31 PROCEDURE — C9113 INJ PANTOPRAZOLE SODIUM, VIA: HCPCS | Performed by: NURSE PRACTITIONER

## 2021-12-31 PROCEDURE — 74011636637 HC RX REV CODE- 636/637: Performed by: NURSE PRACTITIONER

## 2021-12-31 PROCEDURE — 85027 COMPLETE CBC AUTOMATED: CPT

## 2021-12-31 PROCEDURE — 85018 HEMOGLOBIN: CPT

## 2021-12-31 PROCEDURE — 74011250637 HC RX REV CODE- 250/637: Performed by: NURSE PRACTITIONER

## 2021-12-31 PROCEDURE — 84100 ASSAY OF PHOSPHORUS: CPT

## 2021-12-31 PROCEDURE — 65270000015 HC RM PRIVATE ONCOLOGY

## 2021-12-31 PROCEDURE — 2709999900 HC NON-CHARGEABLE SUPPLY

## 2021-12-31 PROCEDURE — 90935 HEMODIALYSIS ONE EVALUATION: CPT

## 2021-12-31 PROCEDURE — 74011250637 HC RX REV CODE- 250/637: Performed by: SPECIALIST

## 2021-12-31 PROCEDURE — 36415 COLL VENOUS BLD VENIPUNCTURE: CPT

## 2021-12-31 PROCEDURE — 74011250636 HC RX REV CODE- 250/636: Performed by: INTERNAL MEDICINE

## 2021-12-31 PROCEDURE — 74011250636 HC RX REV CODE- 250/636: Performed by: NURSE PRACTITIONER

## 2021-12-31 PROCEDURE — 80048 BASIC METABOLIC PNL TOTAL CA: CPT

## 2021-12-31 PROCEDURE — 74011250637 HC RX REV CODE- 250/637: Performed by: INTERNAL MEDICINE

## 2021-12-31 PROCEDURE — 83735 ASSAY OF MAGNESIUM: CPT

## 2021-12-31 RX ORDER — PANTOPRAZOLE SODIUM 40 MG/1
40 TABLET, DELAYED RELEASE ORAL
Status: DISCONTINUED | OUTPATIENT
Start: 2021-12-31 | End: 2022-01-18 | Stop reason: HOSPADM

## 2021-12-31 RX ORDER — CALCIUM CARBONATE 200(500)MG
400 TABLET,CHEWABLE ORAL ONCE
Status: COMPLETED | OUTPATIENT
Start: 2021-12-31 | End: 2021-12-31

## 2021-12-31 RX ORDER — SUCRALFATE 1 G/1
1 TABLET ORAL
Status: DISCONTINUED | OUTPATIENT
Start: 2021-12-31 | End: 2022-01-03

## 2021-12-31 RX ADMIN — Medication 1 AMPULE: at 08:28

## 2021-12-31 RX ADMIN — FENTANYL CITRATE 50 MCG: 0.05 INJECTION, SOLUTION INTRAMUSCULAR; INTRAVENOUS at 08:28

## 2021-12-31 RX ADMIN — SODIUM CHLORIDE, PRESERVATIVE FREE 10 ML: 5 INJECTION INTRAVENOUS at 23:22

## 2021-12-31 RX ADMIN — SODIUM CHLORIDE 40 MG: 9 INJECTION, SOLUTION INTRAMUSCULAR; INTRAVENOUS; SUBCUTANEOUS at 08:28

## 2021-12-31 RX ADMIN — FENTANYL CITRATE 50 MCG: 0.05 INJECTION, SOLUTION INTRAMUSCULAR; INTRAVENOUS at 12:29

## 2021-12-31 RX ADMIN — Medication: at 08:41

## 2021-12-31 RX ADMIN — FENTANYL CITRATE 50 MCG: 0.05 INJECTION, SOLUTION INTRAMUSCULAR; INTRAVENOUS at 17:03

## 2021-12-31 RX ADMIN — CALCIUM CARBONATE (ANTACID) CHEW TAB 500 MG 400 MG: 500 CHEW TAB at 04:35

## 2021-12-31 RX ADMIN — OCTREOTIDE ACETATE 50 MCG/HR: 500 INJECTION, SOLUTION INTRAVENOUS; SUBCUTANEOUS at 08:26

## 2021-12-31 RX ADMIN — Medication 10 ML: at 10:50

## 2021-12-31 RX ADMIN — SODIUM CHLORIDE, PRESERVATIVE FREE 10 ML: 5 INJECTION INTRAVENOUS at 14:00

## 2021-12-31 RX ADMIN — SODIUM CHLORIDE, PRESERVATIVE FREE 40 ML: 5 INJECTION INTRAVENOUS at 06:00

## 2021-12-31 RX ADMIN — SUCRALFATE 1 G: 1 TABLET ORAL at 17:03

## 2021-12-31 RX ADMIN — FENTANYL CITRATE 50 MCG: 0.05 INJECTION, SOLUTION INTRAMUSCULAR; INTRAVENOUS at 00:29

## 2021-12-31 RX ADMIN — OXYCODONE AND ACETAMINOPHEN 1 TABLET: 5; 325 TABLET ORAL at 07:51

## 2021-12-31 RX ADMIN — PANTOPRAZOLE SODIUM 40 MG: 40 TABLET, DELAYED RELEASE ORAL at 17:03

## 2021-12-31 RX ADMIN — FENTANYL CITRATE 50 MCG: 0.05 INJECTION, SOLUTION INTRAMUSCULAR; INTRAVENOUS at 04:30

## 2021-12-31 RX ADMIN — ONDANSETRON 4 MG: 2 INJECTION INTRAMUSCULAR; INTRAVENOUS at 10:45

## 2021-12-31 RX ADMIN — ALUMINUM HYDROXIDE, MAGNESIUM HYDROXIDE, AND SIMETHICONE 30 ML: 200; 200; 20 SUSPENSION ORAL at 01:46

## 2021-12-31 NOTE — DIALYSIS
Hemodialysis / [phone]    Vitals Pre Post Assessment Pre Post   BP BP: 125/87 (12/31/21 0630) 117/85 LOC A&O x 3 A&O x 3   HR Pulse (Heart Rate): 70 (12/31/21 0630) 83 Lungs clear clear   Resp Resp Rate: 18 (12/31/21 0630) 20 Cardiac regular regular   Temp Temp: 98.3 °F (36.8 °C) (12/31/21 0525) 98.1 Skin warm Warm   Weight Pre-Dialysis Weight:  (not working) (12/31/21 0525) Not working Edema +2 LLE,LUE +2 LLE LUE   Tele status bedside bedside Pain Pain Intensity 1: 3 (12/31/21 0500) No pain     Orders   Duration: Start: 0530 End: 0900 Total: 3.5 hr   Dialyzer: Dialyzer/Set Up Inspection: Jeffy Dao ([id number]) (12/31/21 0525)   K Bath: Dialysate K (mEq/L): 2 (12/31/21 0525)   Ca Bath: Dialysate CA (mEq/L): 3.0 (12/31/21 0525)   Na: Dialysate NA (mEq/L): 140 (12/31/21 0525)   Bicarb: Dialysate HCO3 (mEq/L): 40 (12/31/21 0525)   Target Fluid Removal: Goal/Amount of Fluid to Remove (mL): 2000 mL (12/31/21 0525)     Access   Type & Location: SUZANNE AVG   Comments:         Patent, B&T positive, min edema at AVG site, majority is below.  Cannulate dwith 15 g needles x 2                               Labs   HBsAg (Antigen) / date:       12/01/21 Negative                                        HBsAb (Antibody) / date: 11/11/21 Immune   Source:    Obtained/Reviewed  Critical Results Called HGB   Date Value Ref Range Status   12/31/2021 8.5 (L) 12.1 - 17.0 g/dL Final     Potassium   Date Value Ref Range Status   12/31/2021 5.1 3.5 - 5.1 mmol/L Final     Calcium   Date Value Ref Range Status   12/31/2021 7.2 (L) 8.5 - 10.1 MG/DL Final     BUN   Date Value Ref Range Status   12/31/2021 31 (H) 6 - 20 MG/DL Final     Creatinine   Date Value Ref Range Status   12/31/2021 5.58 (H) 0.70 - 1.30 MG/DL Final        Meds Given   Name Dose Route                    Adequacy / Fluid    Total Liters Process: 74 L   Net Fluid Removed: 2000 ml      Comments   Time Out Done:   (Time) Yes, 0554   Admitting Diagnosis: Renal failure Consent obtained/signed: Informed Consent Verified: Yes (12/29/21 4827)   Machine / RO # Machine Number: [id number] (12/31/21 4625)   Primary Nurse Rpt Pre: Rusty Saenz RN   Primary Nurse Rpt Post: Leopoldo Shelby RN   Pt Education: Yes, r/t dialysis process   Care Plan: Continue HD as ordered   Pts outpatient clinic:      Tx Summary   Comments: Tolerated tx well, at end, all blood in circuit returned with 300 ml NS, SUZANNE AVG patent, B&T positive, some edema noted, majority edema is below AVG. Bleeding stopped at both sites, pressure dressing in place.

## 2021-12-31 NOTE — PROGRESS NOTES
Comprehensive Nutrition Assessment    Type and Reason for Visit: Initial,RD nutrition re-screen/LOS    Nutrition Recommendations/Plan:   · Continue NPO for now with diet progression as tolerated. · Continue oral nutritional supplement once diet resumed. · Please document % meals and supplements consumed in flowsheet I/O's under intake. Nutrition Assessment:     12/31: Chart reviewed; med noted for GI bleed. Hx of ESRD-HD. NPO currently for fistulogram by vascular surgical team. Per GI note, ok to increase diet gradually as tolerated. Patient Vitals for the past 168 hrs:   % Diet Eaten   12/29/21 2000 51 - 75%     No data found. Last Weight Metric  Weight Loss Metrics 12/28/2021 12/21/2021 11/17/2021 4/7/2021 3/27/2021 11/8/2020 10/20/2020   Today's Wt 143 lb 15.4 oz 135 lb 1.6 oz 145 lb 12.8 oz 147 lb 4.3 oz 148 lb 9.4 oz 151 lb 4.8 oz 148 lb 13 oz   BMI 22.55 kg/m2 21.16 kg/m2 22.84 kg/m2 23.07 kg/m2 23.27 kg/m2 23.7 kg/m2 23.31 kg/m2     Estimated Daily Nutrient Needs:  Energy (kcal): 1951 (BMR 1501 x 1. 3AF); Weight Used for Energy Requirements: Current  Protein (g): 65-78 (1.0-1.2 g/kg bw); Weight Used for Protein Requirements: Current  Fluid (ml/day): 5830-9412 ml/day; Method Used for Fluid Requirements: 1 ml/kcal    Nutrition Related Findings:  BM: 12/28; Labs: reviewed; Meds: reviewed      Current Nutrition Therapies:  ADULT ORAL NUTRITION SUPPLEMENT Breakfast, Lunch, Dinner; Clear Liquid  DIET NPO    Anthropometric Measures:  · Height:  5' 7\" (170.2 cm)  · Current Body Wt:  65.3 kg (143 lb 15.4 oz)    · Ideal Body Wt:  148 lbs:  97.3 %   · Adjusted Body Weight:   ; Weight Adjustment for: No adjustment   · BMI Category:  Normal weight (BMI 18.5-24. 9)       Nutrition Diagnosis:   · Inadequate protein-energy intake related to altered GI function as evidenced by NPO or clear liquid status due to medical condition    Nutrition Interventions:   Food and/or Nutrient Delivery: Start oral diet  Nutrition Education and Counseling: No recommendations at this time  Coordination of Nutrition Care: Continue to monitor while inpatient    Goals:  Resume oral nutrition within 24-48 hrs post-procedure       Nutrition Monitoring and Evaluation:   Behavioral-Environmental Outcomes: None identified  Food/Nutrient Intake Outcomes: Diet advancement/tolerance,Food and nutrient intake,Supplement intake  Physical Signs/Symptoms Outcomes: Biochemical data,GI status,Weight,Skin    Discharge Planning:     Too soon to determine     Electronically signed by Javier Avila RD on 12/31/2021 at 4:03 PM

## 2021-12-31 NOTE — PROGRESS NOTES
[facility] Vascular  Preliminary Report:  Arterial Duplex Leg - left     An arterial duplex scan of the left leg bypass graft was performed with the following findings:    Left Leg  Vessel    Velocity (cm/s)  Waveform     Inflow Artery   44   Triphasic    Proximal Anastomosis  50   Triphasic    Proximal BPG   63   Triphasic    Mid BPG   61   Monophasic    Distal BPG   58   Monophasic    Distal Anastomosis  35   Monophasic    Outflow Artery   50   Monophasic     LLE graft appears patent. Left PTA proximal portion appears absent. Left JOEY proximal portion appears Monophasic with a velocity of 11 cm/s. PETER                          Blood Pressures:                                                                     Right (mmHg)            Left (mmHg)                                     Brachial: 107   Not obtained       Ankle (DPA): Non Compressible 87 (0.81)           Ankle (PTA): 149 (1.39)  Non Compressible  Digit:  79 (0.74)  Absent    Unable to obtain left brachial pressure due to AV Fistula. Ankle/metatarsal pressures only, patient has LLE bypass graft. Right DP is non compressible, RLE appears moderate. Left PT is non compressible, LLE appears severe with absent digit waveform and absent pulses. Final report to follow.

## 2021-12-31 NOTE — PROGRESS NOTES
Bedside and Verbal shift change report given to WILDER Velazquez RN (oncoming nurse) by E. Greg Denver, RN (offgoing nurse). Report included the following information SBAR, Kardex, Intake/Output, MAR, Recent Results and Cardiac Rhythm NSR.   2000: Assesment completed. Patient c/o abd pain, rates 7/10.  2024: Medicated with Fentanyl 50mcg IV per MAR.  2100: Patient c/o heartburn. Medicated with Mylanta per [facility].  2300: Vascular  at bedside to perform ordered studies. 0020: Reassessment completed. Medicated with Fentanyl per [facility] for pain. 5731: c/o heartburn. Medicated with Mylanta per [facility].   0215: Patient resting with eyes closed, respirations even and unlabored. 0400: C/o persistent indigestion with coughing despite 2 doses of Mylanta.   0408: Notified Juli Owen NP of persistent indigestion. Orders received for 2 tums po x 1, (calcium level yesterday was 7.4). 0727: Medicated with tums as ordered. 0440: Lizette Jamison in to start HD.   0500: Patient resting with eyes closed. Reports pain and indigestion improved. Tolerating HD well with no flow problems reported by Lizette Jamison RN. 0740: Bedside and Verbal shift change report given to JANY Gutierrez (oncoming nurse) by WILDER Velazquez RN (offgoing nurse). Report included the following information SBAR, Kardex, Intake/Output, MAR, Recent Results and Cardiac Rhythm NSR.

## 2021-12-31 NOTE — ACP (ADVANCE CARE PLANNING)
Advance Care Planning Note      NAME: Martin Corrales   :  1975   MRN:  [id number]     Date/Time:  2021 4:54 PM    Active Diagnoses:  Hospital Problems  Date Reviewed: 2021          Codes Class Noted POA    GIB (gastrointestinal bleeding) ICD-10-CM: K92.2  ICD-9-CM: 578.9  2021 Unknown              These active diagnoses are of sufficient risk that focused discussion on advance care planning is indicated in order to allow the patient to thoughtfully consider personal goals of care, and if situations arise that prevent the ability to personally give input, to ensure appropriate representation of their personal desires for different levels and aggressiveness of care. Discussion:   We discussed his multiple medical problems including his heart failure, kidney failure status post 2 renal transplant attempts, PAD, and multiple GI bleeds. We discussed his chronic conditions including being on dialysis since , and dealing with chronic abdominal pain related to adhesions from abdominal surgery from . I told him that while I am hopeful that we will be able to get him home with this admission, I worry about his long-term prognosis and quality of life. I told him that my official medical recommendation would be to be a DO NOT RESUSCITATE, as I do not see him surviving resuscitative attempts. Mr. Anahi Mccabe seemed very caught off guard by this conversation and stated that while he has had many illnesses, he has always rebounded, and he does not want to pursue a DNR at this moment. While he does not want to pursue a DNR at this moment, he was amenable to a palliative care consult. Persons present and participating in discussion: Cecily Lombard, NP      Time Spent:   Total time spent face-to-face in education and discussion:  20  minutes.          Shadi Best NP   Hospitalist

## 2021-12-31 NOTE — PROGRESS NOTES
Gastroenterology Daily Progress Note (Dr. Ivan Rm for Dr. Sarah Antonio)   [facility] [address] Date: 12/26/2021       Subjective:       Patient seen in room post dialysis. He is c/o \"heartburn\" despite protonix. His Angio study with IR reviewed w pt. Did not document portal HTN and no varices seen.     Current Facility-Administered Medications   Medication Dose Route Frequency    sucralfate (CARAFATE) tablet 1 g  1 g Oral ACB&D    fentaNYL citrate (PF) injection 50 mcg  50 mcg IntraVENous Q4H PRN    alum-mag hydroxide-simeth (MYLANTA) oral suspension 30 mL  30 mL Oral Q4H PRN    0.9% sodium chloride infusion 250 mL  250 mL IntraVENous PRN    balsam peru-castor oiL (VENELEX) ointment   Topical BID    oxyCODONE-acetaminophen (PERCOCET) 5-325 mg per tablet 1 Tablet  1 Tablet Oral Q4H PRN    sodium chloride (NS) flush 5-40 mL  5-40 mL IntraVENous Q8H    sodium chloride (NS) flush 5-40 mL  5-40 mL IntraVENous PRN    acetaminophen (TYLENOL) tablet 650 mg  650 mg Oral Q6H PRN    Or    acetaminophen (TYLENOL) suppository 650 mg  650 mg Rectal Q6H PRN    polyethylene glycol (MIRALAX) packet 17 g  17 g Oral DAILY PRN    ondansetron (ZOFRAN) injection 4 mg  4 mg IntraVENous Q6H PRN    octreotide (SANDOSTATIN) 500 mcg in 0.9% sodium chloride 500 mL infusion  50 mcg/hr IntraVENous CONTINUOUS    pantoprazole (PROTONIX) 40 mg in 0.9% sodium chloride 10 mL injection  40 mg IntraVENous Q12H    alcohol 62% (NOZIN) nasal  1 Ampule  1 Ampule Topical Q12H    NOREPINephrine (LEVOPHED) 8 mg in 5% dextrose 250mL (32 mcg/mL) infusion  0.5-16 mcg/min IntraVENous TITRATE    epoetin emilie-epbx (RETACRIT) injection 20,000 Units  20,000 Units SubCUTAneous Q MON, WED & FRI        Objective:     Visit Vitals  /87 (BP 1 Location: Right lower arm, BP Patient Position: At rest)   Pulse 88   Temp 98.2 °F (36.8 °C)   Resp 21   Ht 5' 7\" (1.702 m)   Wt 65.3 kg (143 lb 15.4 oz)   SpO2 99%   BMI 22.55 kg/m²   Blood pressure 121/87, pulse 88, temperature 98.2 °F (36.8 °C), resp. rate 21, height 5' 7\" (1.702 m), weight 65.3 kg (143 lb 15.4 oz), SpO2 99 %. No intake/output data recorded. 12/29 1901 - 12/31 0700  In: 2736.8 [P.O.:800; I.V.:1936.8]  Out: 2010       Intake/Output Summary (Last 24 hours) at 12/31/2021 0913  Last data filed at 12/31/2021 0600  Gross per 24 hour   Intake 1489.99 ml   Output 10 ml   Net 1479.99 ml     Physical Exam:     General: awake and alert BM in NAD  Chest:  CTA, No rhonchi, rales or rubs.   Heart: S1, S2, RRR  GI: Soft, NT, ND + bowel sounds    Labs:     Recent Results (from the past 24 hour(s))   ANKLE BRACHIAL INDEX    Collection Time: 12/30/21 10:20 PM   Result Value Ref Range    Left posterior tibial 0 mmHg    Left dorsalis pedis BP 87 mmHg    Left toe pressure 0 mmHg    Right arm  mmHg    Right posterior tibial 149 mmHg    Right dorsalis pedis BP 0 mmHg    Right toe pressure 79 mmHg    Left PETER 0.81     Right PETER 1.39     Left TBI 0.00     Right TBI 0.74    DUPLEX LOWER EXT BYPASS GRAFT LEFT    Collection Time: 12/30/21 11:02 PM   Result Value Ref Range    Right Prox JOEY Velocity 11.0 cm/s   CBC W/O DIFF    Collection Time: 12/31/21  3:56 AM   Result Value Ref Range    WBC 10.0 4.1 - 11.1 K/uL    RBC 2.76 (L) 4.10 - 5.70 M/uL    HGB 8.5 (L) 12.1 - 17.0 g/dL    HCT 27.8 (L) 36.6 - 50.3 %    .7 (H) 80.0 - 99.0 FL    MCH 30.8 26.0 - 34.0 PG    MCHC 30.6 30.0 - 36.5 g/dL    RDW 19.2 (H) 11.5 - 14.5 %    PLATELET 352 (L) 491 - 400 K/uL    MPV 11.7 8.9 - 12.9 FL    NRBC 0.2 (H) 0  WBC    ABSOLUTE NRBC 0.02 (H) 0.00 - 2.18 K/uL   METABOLIC PANEL, BASIC    Collection Time: 12/31/21  3:56 AM   Result Value Ref Range    Sodium 135 (L) 136 - 145 mmol/L    Potassium 5.1 3.5 - 5.1 mmol/L    Chloride 99 97 - 108 mmol/L    CO2 29 21 - 32 mmol/L    Anion gap 7 5 - 15 mmol/L    Glucose 140 (H) 65 - 100 mg/dL    BUN 31 (H) 6 - 20 MG/DL    Creatinine 5.58 (H) 0.70 - 1.30 MG/DL    BUN/Creatinine ratio 6 (L) 12 - 20      GFR est AA 14 (L) >60 ml/min/1.73m2    GFR est non-AA 11 (L) >60 ml/min/1.73m2    Calcium 7.2 (L) 8.5 - 10.1 MG/DL   MAGNESIUM    Collection Time: 12/31/21  3:56 AM   Result Value Ref Range    Magnesium 2.4 1.6 - 2.4 mg/dL   PHOSPHORUS    Collection Time: 12/31/21  3:56 AM   Result Value Ref Range    Phosphorus 5.8 (H) 2.6 - 4.7 MG/DL   LABRCNT(wbc:2,hgb:2,hct:2,plt:2,)  Recent Labs     12/31/21  0356 12/30/21  0420 12/29/21  0355   * 136 136   K 5.1 4.7 5.7*   CL 99 98 102   CO2 29 28 27   BUN 31* 23* 39*   CREA 5.58* 4.49* 6.19*   * 156* 133*   CA 7.2* 7.4* 7.2*   MG 2.4 2.4 2.4   PHOS 5.8* 5.1* 6.5*     Lab Results   Component Value Date/Time    Folate 7.5 11/23/2011 11:26 AM      Ref. Range 12/28/2021 16:53 12/28/2021 22:10 12/29/2021 03:55 12/30/2021 04:20 12/31/2021 03:56   HGB Latest Ref Range: 12.1 - 17.0 g/dL 8.5 (L) 9.5 (L) 9.1 (L) 8.5 (L) 8.5 (L)   HCT Latest Ref Range: 36.6 - 50.3 % 27.0 (L) 30.3 (L) 28.9 (L) 27.1 (L) 27.8 (L)     Impression:    Recurrent brisk UGI bleeding felt to be due to gastric varices, linear gastric ulcers on last EGD 12/26/21    ESRD on HD    Gastric varices seen on CT and EGD but negative IR angio documenting portal     HTN    Recent COVID pneumonia    Moderate Pulm HTN         Plan:  Patient's portal pressures not c/w portal HTN therefore not candidate for TIPS placement. Therefore bleeding from gastric varices that were seen previously not an easy fix at this time. Liver is normal on U/S and CT without signs of cirrhosis. He could have had passive congestion from R sided CHF causing acute liver decompensation that has improved.   Too high risk at present to do a liver biopsy.  -would slowly advance diet as you already are doing  -would continue serial H/H  -if he rebleeds could need a repeat EGD to r/o Dieulafoy lesion of stomach  -add carafate for his dyspepsia sxs to [address], MD    12/31/2021 [address] [facility], [address] [facility] [address] [id number] [address] [place]: [phone]

## 2021-12-31 NOTE — PROGRESS NOTES
I reviewed pertinent labs and imaging, and discussed /agreed on the plan of care with Dr. Nayeli Osorio. Hospitalist Progress Note    NAME: Sanjuana Velazco   :  1975   MRN:  [id number]       Assessment / Plan: This is a 77-year-old male with a history of dilated, end-stage renal disease, on hemodialysis , Streptococcus intermedius bacteremia in 2021, status post IV thank stopped 2021, gastric varices with upper GI bleed, thrombocytopenia, transaminitis, previous Covid pneumonia, chronically anticoagulated with warfarin for recurrent DVT, acute on chronic systolic heart failure with an EF of 35%, history of V. tach status post AICD, CAD, severe pulmonary hypertension who presented to the emergency room  with complaints of epigastric abdominal pain and vomiting throughout the day. He was discharged  for the same symptoms having received an EGD with banding of esophageal varices as well as COVID-19 pneumonia. Patient had an unwitnessed fall in the emergency room. He received CT of the head and C-spine both of which were negative for any acute process. He was admitted to the ICU for management of an upper GI bleed and intubated. Hemoglobin in the ER was 8.6 (was 9.4 last discharge). He transferred out of the ICU . Hemorrhagic shock secondary to acute blood loss anemia, complicated by anemia of CKD and thrombocytopenia, secondary to upper GI bleed, bleeding gastric varices  -Status post 13 units of PRBCs, 3 units of plasma, 2 units of cryoprecipitate, 3 units of platelets, pressors, octreotide drip, PPI  -Status post 4 EGDs, most recent EGD here , which showed 2 healing esophageal ulcers without stigmata, large gastric varices in the stomach, with 1 gastric varices oozing blood status post epinephrine injection, 2 hemoclips.   -Was felt to not be a candidate for TIPS due to severe right heart failure  -He was reintubated December 28 for IR evaluation for BRTO December 28, but he was found to have a large gastric renal shunt which would deem the procedure to the unlikely of any benefit, if even possible, making TIPS the only viable option  -TIPS procedure attempted 12/30 but his portal pressures were not consistent with portal hypertension, therefore he was not a candidate  -Liver was found to be normal on ultrasound and CT was negative for signs of cirrhosis  -? Passive congestion from right-sided heart failure causing acute liver decompensation but has improved, but patient is too high risk to do a liver biopsy currently  -Hemoglobin this morning stable at 8.5, platelets down trended from 133-117  -Appreciate recommendations from GI and IR  - Per GI: Serial H&H, advance diet, if he rebleeds, could possibly need a repeat EGD to rule out Dieulafoy lesion of stomach  -Continue pantoprazole, Carafate  -Discussed code status again today and he wishes to remain a full code, but is amenable to a palliative consult- this has been ordered     Acute hypoxic respiratory failure status post extubation  -Intubated December 28, extubated December 29  -Currently on 2 to 3 L via nasal cannula  -Wean as able    ESRD on hemodialysis, status post renal transplant x2  -Creatinine as of December 31 5.58, Phos 5.8, potassium 5.1  -Nephrology on board, appreciate the recommendations.  -Continue Retacrit    Heart failure reduced EF, not in exacerbation  -Last EF 35% (November 2021)  -Not currently on a beta-blocker or an ACE inhibitor in the setting of acute illness    History of HIT  -Avoid heparin  -SCDs contraindicated due to PAD     Left leg swelling with a History of DVT, s/p IVC   -Vascular on board- appreciate the recommendations   -Duplex dated 12-30-21: Graft 1: left fem-tibial graft  The graft inflow demonstrates <50% stenosis. The proximal anastomosis demonstrates <50% stenosis. The proximal graft demonstrates <50% stenosis.  The mid graft demonstrates <50% stenosis. The distal graft demonstrates <50% stenosis. The distal anastomosis demonstrates <50% stenosis. The graft outflow demonstrates severe (>75%) stenosis. The mid, distal and outflow graft flow is monophasic. The inflow and proximal graft flow is triphasic. -PETER 12-30-21: PVR waveforms in the right ankle consistent with moderate disease. PVR waveforms at the right metatarsal region consistent with moderate disease. PVR waveforms in the left ankle consistent with moderate disease. PVR waveforms of the left metatarsal region consistent with severe disease. Unable to obtain left brachial pressure due to AV Fistula. Ankle/metatarsal pressures only, patient has LLE bypass graft. Right DP is non compressible, RLE appears moderate. Left PT is non compressible, LLE appears severe with absent digit waveform and absent pulses. LLE bypass graft appears patent. Left PTA proximal appears occluded. Left JOEY proximal appears monophasic with a velocity of 11 cm/s. Deconditioning  -PT/OT    Chronic pain, chronically on opioids  -As needed fentanyl, Percocet, Tylenol    Wounds to left second and third great toes  -Continue wound care: wash with soap and water, dry completely. paint with betadine/povidine swabs  Weave a 4x4 between toes. Left arm swelling in the setting of known central venous stenosis  -Evaluated by vascular surgery-appreciate the recommendations  -Fistulogram was ordered for today but as of 1630 this still has not been done, and IR is not picking up- charge RN is reaching out to the nursing supervisors and primary RN is reaching out to vascular  -If not going to be completed today, pt can eat. 18.5 - 24.9 Normal weight / Body mass index is 22.55 kg/m².     Estimated discharge date: January 4  Barriers:    Code status: Full  Prophylaxis: H2B/PPI  Recommended Disposition: Home w/Family     Subjective:     Chief Complaint / Reason for Physician Visit  Seen for hospital f/u re: hemorraghic shock 2/2 GIB. He is seen laying in bed. Endorses his chronic pain from abdominal surgery adhesions. He states that he was supposed to have a procedure today and that he is NPO for it but he hasn't heard anything about it and it's almost dinner time. He states a nurse removed the dressing from his left foot but never replaced the dressing. He is requesting pain medication. I have notified his RN about these concerns. He endorses mild nausea, no vomiting, no bleeding. We discussed his long term prognosis regarding his heart failure, renal failure, recurrent GIB, and I brought up a DNR order. He seemed very caught off guard by this. He did not want to pursue DNR at this time but is amenable to a palliative care consult, which has been ordered. Discussed with RN events overnight. Review of Systems:  Symptom Y/N Comments  Symptom Y/N Comments   Fever/Chills N   Chest Pain N    Poor Appetite N   Edema Y L LEG   Cough N   Abdominal Pain Y Chronic    Sputum N   Joint Pain N    SOB/ZAMUDIO N   Pruritis/Rash N    Nausea/vomit Y NAUSEA AS ABOVE  Tolerating PT/OT -    Diarrhea N   Tolerating Diet N NPO- wants to eat- see above   Constipation N   Other Y      Could NOT obtain due to: N/A     Objective:     VITALS:   Last 24hrs VS reviewed since prior progress note.  Most recent are:  Patient Vitals for the past 24 hrs:   Temp Pulse Resp BP SpO2   12/31/21 1330 98.5 °F (36.9 °C) 80 16 117/86 100 %   12/31/21 1300 -- 89 19 119/79 96 %   12/31/21 1200 98.9 °F (37.2 °C) 87 17 134/88 97 %   12/31/21 1100 -- 79 19 128/84 94 %   12/31/21 1000 -- 85 17 114/75 98 %   12/31/21 0915 -- 82 16 117/85 100 %   12/31/21 0900 -- 83 18 110/81 99 %   12/31/21 0845 -- 90 20 112/82 100 %   12/31/21 0830 -- 88 20 114/85 100 %   12/31/21 0815 98.2 °F (36.8 °C) 88 21 121/87 99 %   12/31/21 0800 -- 89 20 113/85 100 %   12/31/21 0745 -- 81 20 123/81 100 %   12/31/21 0730 -- 76 20 132/86 100 %   12/31/21 0715 -- 75 20 132/86 100 % 12/31/21 0700 -- 74 16 125/86 100 %   12/31/21 0645 -- 89 17 115/87 100 %   12/31/21 0630 -- 70 18 125/87 99 %   12/31/21 0615 -- 67 17 119/83 99 %   12/31/21 0600 -- 66 15 126/83 100 %   12/31/21 0545 -- 69 20 117/84 --   12/31/21 0530 -- 68 20 126/83 95 %   12/31/21 0525 98.3 °F (36.8 °C) 67 20 119/85 96 %   12/31/21 0500 -- 76 19 127/86 96 %   12/31/21 0400 98.4 °F (36.9 °C) 75 22 124/88 97 %   12/31/21 0300 -- 74 18 130/88 99 %   12/31/21 0200 -- 76 17 115/80 100 %   12/31/21 0100 -- 77 17 105/65 94 %   12/31/21 0000 98.7 °F (37.1 °C) 74 18 106/65 96 %   12/30/21 2300 -- 73 18 109/75 97 %   12/30/21 2200 -- 72 17 -- 96 %   12/30/21 2100 -- 70 15 110/67 97 %   12/30/21 2000 98.5 °F (36.9 °C) 71 17 107/72 100 %   12/30/21 1815 -- 68 16 106/69 97 %   12/30/21 1800 -- 67 15 102/65 97 %   12/30/21 1745 -- 69 14 104/65 97 %   12/30/21 1730 -- 69 15 100/65 98 %   12/30/21 1715 -- 70 15 102/64 100 %   12/30/21 1700 -- 70 11 105/68 100 %   12/30/21 1645 -- 65 9 116/74 100 %   12/30/21 1640 -- 67 18 (!) 152/89 100 %   12/30/21 1635 97.8 °F (36.6 °C) 79 13 127/82 100 %   12/30/21 1630 -- 73 11 128/83 98 %   12/30/21 1625 -- 74 10 136/82 99 %       Intake/Output Summary (Last 24 hours) at 12/31/2021 1504  Last data filed at 12/31/2021 0900  Gross per 24 hour   Intake 1489.99 ml   Output 2010 ml   Net -520.01 ml        I had a face to face encounter and independently examined this patient on 12/31/2021, as outlined below:  PHYSICAL EXAM:  General: Alert, cooperative, no acute distress    EENT:  Anicteric sclerae. MMM  Resp:  Diminished bilaterally, no wheezing or rales,on 2L. CV:  Regular  rhythm, no murmurs, rubs or gallops  GI:  Soft, Non distended,  tender. +Bowel sounds  Neurologic:  Alert and oriented X 3, normal speech,   Psych:   Good insight. Not anxious nor agitated  Skin:  No rashes. No jaundice.  Dressing to LUE fistula CDI  MSK:  Moves all extremities  PVS:   Palpable pulses, left leg and left arm with significant non-pitting edema    Reviewed most current lab test results and cultures  YES  Reviewed most current radiology test results   YES  Review and summation of old records today    NO  Reviewed patient's current orders and MAR    YES  PMH/SH reviewed - no change compared to H&P  ________________________________________________________________________  Care Plan discussed with:    Comments   Patient Y    Family      RN Y    Care Manager     Consultant                        Multidiciplinary team rounds were held today with , nursing, pharmacist and clinical coordinator. Patient's plan of care was discussed; medications were reviewed and discharge planning was addressed. ________________________________________________________________________      Comments   >50% of visit spent in counseling and coordination of care Y    ________________________________________________________________________  Tomas Dooley NP     Procedures: see electronic medical records for all procedures/Xrays and details which were not copied into this note but were reviewed prior to creation of Plan. LABS:  I reviewed today's most current labs and imaging studies.   Pertinent labs include:  Recent Labs     12/31/21  0356 12/30/21  0420 12/29/21  0355   WBC 10.0 9.4 14.8*   HGB 8.5* 8.5* 9.1*   HCT 27.8* 27.1* 28.9*   * 133* 183     Recent Labs     12/31/21  0356 12/30/21  0420 12/29/21  0355   * 136 136   K 5.1 4.7 5.7*   CL 99 98 102   CO2 29 28 27   * 156* 133*   BUN 31* 23* 39*   CREA 5.58* 4.49* 6.19*   CA 7.2* 7.4* 7.2*   MG 2.4 2.4 2.4   PHOS 5.8* 5.1* 6.5*       Signed: Tomas Dooley, NP

## 2021-12-31 NOTE — PROGRESS NOTES
ICU Progress Note        Subjective: Overnight events noted. Vital Signs:    Visit Vitals  /85   Pulse 82   Temp 98.2 °F (36.8 °C)   Resp 16   Ht 5' 7\" (1.702 m)   Wt 65.3 kg (143 lb 15.4 oz)   SpO2 100%   BMI 22.55 kg/m²       O2 Device: None (Room air)   O2 Flow Rate (L/min): 2 l/min   Temp (24hrs), Av.3 °F (36.8 °C), Min:97.8 °F (36.6 °C), Max:98.7 °F (37.1 °C)       Intake/Output:   Last shift:      701 - 1900  In: -   Out:    Last 3 shifts: 1901 - 700  In: 2736.8 [P.O.:800; I.V.:1936.8]  Out:      Intake/Output Summary (Last 24 hours) at 2021 1017  Last data filed at 2021 0900  Gross per 24 hour   Intake 1489.99 ml   Output  ml   Net -520.01 ml       Ventilator Pressures  PIP Observed (cm H2O): 21 cm H2O  Plateau Pressure (cm H2O): 17 cm H2O  MAP (cm H2O): 8.4  PEEP/VENT (cm H2O): 5 cm H20  Auto PEEP Observed (cm H2O): 0.7 cm H2O    Physical Exam:    GEN: Sitting comfortably on the bed. HEENT: anicteric sclerae, pink conj  NECK: Supple, -LAD, no neck mass, CVC in place with dressing C/D/I  CV: no murmurs noted. LUNGS: Coarse BS at bases, otherwise no wheezes, rhonchi, rales  ABD: soft, non-tender, no masses  EXT: No cyanosis, distal pulses palpable, + edema  SKIN: warm, dry and intact. NEURO: Alert, awake and oriented x 3.       DATA:     Current Facility-Administered Medications   Medication Dose Route Frequency    sucralfate (CARAFATE) tablet 1 g  1 g Oral ACB&D    fentaNYL citrate (PF) injection 50 mcg  50 mcg IntraVENous Q4H PRN    alum-mag hydroxide-simeth (MYLANTA) oral suspension 30 mL  30 mL Oral Q4H PRN    balsam peru-castor oiL (VENELEX) ointment   Topical BID    oxyCODONE-acetaminophen (PERCOCET) 5-325 mg per tablet 1 Tablet  1 Tablet Oral Q4H PRN    sodium chloride (NS) flush 5-40 mL  5-40 mL IntraVENous Q8H    sodium chloride (NS) flush 5-40 mL  5-40 mL IntraVENous PRN    acetaminophen (TYLENOL) tablet 650 mg  650 mg Oral Q6H PRN    Or    acetaminophen (TYLENOL) suppository 650 mg  650 mg Rectal Q6H PRN    polyethylene glycol (MIRALAX) packet 17 g  17 g Oral DAILY PRN    ondansetron (ZOFRAN) injection 4 mg  4 mg IntraVENous Q6H PRN    pantoprazole (PROTONIX) 40 mg in 0.9% sodium chloride 10 mL injection  40 mg IntraVENous Q12H    alcohol 62% (NOZIN) nasal  1 Ampule  1 Ampule Topical Q12H    epoetin emilie-epbx (RETACRIT) injection 20,000 Units  20,000 Units SubCUTAneous Q MON, WED & FRI         Labs: Results:       Chemistry Recent Labs     12/31/21  0356 12/30/21  0420 12/29/21  0355   * 156* 133*   * 136 136   K 5.1 4.7 5.7*   CL 99 98 102   CO2 29 28 27   BUN 31* 23* 39*   CREA 5.58* 4.49* 6.19*   CA 7.2* 7.4* 7.2*   AGAP 7 10 7   BUCR 6* 5* 6*      CBC w/Diff Recent Labs     12/31/21  0356 12/30/21  0420 12/29/21  0355   WBC 10.0 9.4 14.8*   RBC 2.76* 2.67* 2.92*   HGB 8.5* 8.5* 9.1*   HCT 27.8* 27.1* 28.9*   * 133* 183      Coagulation No results for input(s): PTP, INR, APTT, INREXT, INREXT in the last 72 hours. Liver Enzymes No results for input(s): TP, ALB, TBIL, AP in the last 72 hours. No lab exists for component: SGOT, GPT, DBIL   ABG No results found for: PH, PHI, PCO2, PCO2I, PO2, PO2I, HCO3, HCO3I, FIO2, FIO2I   Microbiology No results for input(s): CULT in the last 72 hours. maging:  CXR Results  (Last 48 hours)    None          CT Results  (Last 48 hours)    None               Assessment and Plan:    Harsha Birch is a 42 y.o. male who has a PMH of ESRD on HD via fistula, recent COVID-19 infection, recent UGIB secondary to gastric varices and esophageal ulcerations c/b hemorrhagic shock, recent S intermedius bacteremia, recurrent DVT, IVC filter, HFrEF 35% s/p AICD, CAD, and severe pulmonary HTN who was recently discharged from [facility] on 12/24 following a prolonged hospital course for GIB secondary to GVs and esophageal ulcerations and also COVID-19 pneumonia.   He had been taken off isolation prior to discharge. He presented to the ED on 12/26 reporting 4 episodes of bright red hematemesis and 1 episode of maroon stool. He also reported feeling \"weak and dizzy\". He experienced a syncopal episode in the ED upon standing. H/H 8.6/26.4    Acute GI bleed/Hemorrhagic shock -  It was thought to be from  gastric varices, he had four EGD's for no endoscopic intervention that could be done. IR tried embolization procedure, but did not find a target. He went for TIPS procedure 12/30 but no varices seen. Cont. PPI. D/c Octreotide. Monitor H/H and transfuse as needed for Hb >7 gm/dl. Off vasopressors. ESRD on HD - failed renal transplant. Cont. HD as per nephrology. HHrEF/Pulmonary hypertension - EF 35%. Has AICD. Cont. To monitor closely. Keep euvolemic. History of  HIT - Avoid heparin. History of DVT - IVC filter. Hold anticoagulants due to GI bleed. Pain - Patient is on chronic pain medications (percocet at home). Restart home medications. Transfer to floor.      Thien Boone MD, FCCP, ATSF, FACP, DAABIP  Interventional Pulmonology/Critical [address] [place] [address]

## 2021-12-31 NOTE — PROGRESS NOTES
Nephrology Progress Note  Blaise Leblanc     [facility]  Phone - [phone]   Patient: Favio Lemus    YOB: 1975        Date- 12/31/2021   Admit Date: 12/26/2021  CC: Follow up for esrd          IMPRESSION & PLAN:   ·  esrd - home HD 5 days per week- Follows up with Dr Meredith Bal at [facility]  · Covid 19 infection  · Hypotension  · Acute blood loss anemia   · H/o esophageal bleed from esophagitis  · Left arm swelling- s/p angioplasty of left subclavian vein  · Gram positive sepsis from dental abcess  · s/p lead extraction at [facility]  · Anemia of ckd  · Hx of renal tx in past times 2.  · Hypertension  · CAD  · Failed RTX times 2  · H/o DVT, s/p ivc filter/ h/o HIT    · Hyperkalemia    PLAN-   Plan for hemodialysis today   Status post TIPS   Plan for fistulogram today by vascular surgery   Transfuse for hemoglobin less than 7, will continue EPO with dialysis     Subjective: Interval History:   -Seen and examined in ICU   -Currently awake and just finished dialysis      Objective:   Vitals:    12/31/21 0830 12/31/21 0845 12/31/21 0900 12/31/21 0915   BP: 114/85 112/82 110/81 117/85   Pulse: 88 90 83 82   Resp: 20 20 18 16   Temp:       TempSrc:       SpO2: 100% 100% 99% 100%   Weight:       Height:          12/30 0701 - 12/31 0700  In: 8599 [P.O.:240; I.V.:1250]  Out: 10   Last 3 Recorded Weights in this Encounter    12/26/21 0023 12/27/21 0700 12/28/21 0400   Weight: 61.2 kg (135 lb) 65.8 kg (145 lb 1 oz) 65.3 kg (143 lb 15.4 oz)      Physical exam:   GEN: NAD  NECK- Supple  RESP: no distress  NEURO:non focal  Patient examined through the glass door to minimize exposure to Covid and to preserve PPE      Chart reviewed. Pertinent Notes reviewed.      Data Review :  Recent Labs     12/31/21  0356 12/30/21  0420 12/29/21  0355   * 136 136   K 5.1 4.7 5.7*   CL 99 98 102   CO2 29 28 27   BUN 31* 23* 39*   CREA 5.58* 4.49* 6.19*   * 156* 133*   CA 7. 2* 7.4* 7.2*   MG 2.4 2.4 2.4   PHOS 5.8* 5.1* 6.5*     Recent Labs     12/31/21  0356 12/30/21  0420 12/29/21  0355   WBC 10.0 9.4 14.8*   HGB 8.5* 8.5* 9.1*   HCT 27.8* 27.1* 28.9*   * 133* 183     No results for input(s): FE, TIBC, PSAT, FERR in the last 72 hours.    Medication list  reviewed  Current Facility-Administered Medications   Medication Dose Route Frequency    sucralfate (CARAFATE) tablet 1 g  1 g Oral ACB&D    fentaNYL citrate (PF) injection 50 mcg  50 mcg IntraVENous Q4H PRN    alum-mag hydroxide-simeth (MYLANTA) oral suspension 30 mL  30 mL Oral Q4H PRN    balsam peru-castor oiL (VENELEX) ointment   Topical BID    oxyCODONE-acetaminophen (PERCOCET) 5-325 mg per tablet 1 Tablet  1 Tablet Oral Q4H PRN    sodium chloride (NS) flush 5-40 mL  5-40 mL IntraVENous Q8H    sodium chloride (NS) flush 5-40 mL  5-40 mL IntraVENous PRN    acetaminophen (TYLENOL) tablet 650 mg  650 mg Oral Q6H PRN    Or    acetaminophen (TYLENOL) suppository 650 mg  650 mg Rectal Q6H PRN    polyethylene glycol (MIRALAX) packet 17 g  17 g Oral DAILY PRN    ondansetron (ZOFRAN) injection 4 mg  4 mg IntraVENous Q6H PRN    pantoprazole (PROTONIX) 40 mg in 0.9% sodium chloride 10 mL injection  40 mg IntraVENous Q12H    alcohol 62% (NOZIN) nasal  1 Ampule  1 Ampule Topical Q12H    epoetin emilie-epbx (RETACRIT) injection 20,000 Units  20,000 Units SubCUTAneous Q MON, WED & FRI          Osvaldo French MD              [facility]  [facility], [facility], [address]  Phone - [phone]               Fax - [phone]

## 2021-12-31 NOTE — PROGRESS NOTES
Vascular    LUE still swollen but AVG worked well on HD  He has been NPO today but was not on OR schedule  Other more urgent issues preclude doing and angio for a functional access  I will do an angiogram as soon as the schedule allows  If he is redy for discharge before I can do it, it can be done as an outpatient

## 2022-01-01 LAB
LEFT ABI: 0.81
LEFT POSTERIOR TIBIAL: 0 MMHG
LEFT TBI: 0
LEFT TOE PRESSURE: 0 MMHG
RIGHT ABI: 1.39
RIGHT ARM BP: 107 MMHG
RIGHT POSTERIOR TIBIAL: 149 MMHG
RIGHT PROX ATA VELOCITY: 11 CM/S
RIGHT TBI: 0.74
RIGHT TOE PRESSURE: 79 MMHG
VAS LEFT DORSALIS PEDIS BP: 87 MMHG
VAS RIGHT DORSALIS PEDIS BP: 0 MMHG

## 2022-01-01 PROCEDURE — 74011250637 HC RX REV CODE- 250/637: Performed by: INTERNAL MEDICINE

## 2022-01-01 PROCEDURE — 74011250637 HC RX REV CODE- 250/637: Performed by: SPECIALIST

## 2022-01-01 PROCEDURE — 74011250636 HC RX REV CODE- 250/636: Performed by: INTERNAL MEDICINE

## 2022-01-01 PROCEDURE — 65270000015 HC RM PRIVATE ONCOLOGY

## 2022-01-01 PROCEDURE — 74011250636 HC RX REV CODE- 250/636: Performed by: NURSE PRACTITIONER

## 2022-01-01 PROCEDURE — 74011250637 HC RX REV CODE- 250/637: Performed by: NURSE PRACTITIONER

## 2022-01-01 RX ADMIN — ONDANSETRON 4 MG: 2 INJECTION INTRAMUSCULAR; INTRAVENOUS at 14:53

## 2022-01-01 RX ADMIN — FENTANYL CITRATE 50 MCG: 0.05 INJECTION, SOLUTION INTRAMUSCULAR; INTRAVENOUS at 00:46

## 2022-01-01 RX ADMIN — FENTANYL CITRATE 50 MCG: 0.05 INJECTION, SOLUTION INTRAMUSCULAR; INTRAVENOUS at 20:36

## 2022-01-01 RX ADMIN — SODIUM CHLORIDE, PRESERVATIVE FREE 10 ML: 5 INJECTION INTRAVENOUS at 05:49

## 2022-01-01 RX ADMIN — FENTANYL CITRATE 50 MCG: 0.05 INJECTION, SOLUTION INTRAMUSCULAR; INTRAVENOUS at 05:48

## 2022-01-01 RX ADMIN — EPOETIN ALFA-EPBX 20000 UNITS: 20000 INJECTION, SOLUTION INTRAVENOUS; SUBCUTANEOUS at 02:36

## 2022-01-01 RX ADMIN — FENTANYL CITRATE 50 MCG: 0.05 INJECTION, SOLUTION INTRAMUSCULAR; INTRAVENOUS at 09:37

## 2022-01-01 RX ADMIN — OXYCODONE AND ACETAMINOPHEN 1 TABLET: 5; 325 TABLET ORAL at 18:22

## 2022-01-01 RX ADMIN — FENTANYL CITRATE 50 MCG: 0.05 INJECTION, SOLUTION INTRAMUSCULAR; INTRAVENOUS at 16:32

## 2022-01-01 RX ADMIN — PANTOPRAZOLE SODIUM 40 MG: 40 TABLET, DELAYED RELEASE ORAL at 09:37

## 2022-01-01 RX ADMIN — PANTOPRAZOLE SODIUM 40 MG: 40 TABLET, DELAYED RELEASE ORAL at 16:32

## 2022-01-01 RX ADMIN — Medication 1 AMPULE: at 08:45

## 2022-01-01 RX ADMIN — FENTANYL CITRATE 50 MCG: 0.05 INJECTION, SOLUTION INTRAMUSCULAR; INTRAVENOUS at 12:53

## 2022-01-01 RX ADMIN — SODIUM CHLORIDE, PRESERVATIVE FREE 10 ML: 5 INJECTION INTRAVENOUS at 23:52

## 2022-01-01 RX ADMIN — SUCRALFATE 1 G: 1 TABLET ORAL at 09:37

## 2022-01-01 RX ADMIN — OXYCODONE AND ACETAMINOPHEN 1 TABLET: 5; 325 TABLET ORAL at 03:13

## 2022-01-01 RX ADMIN — SUCRALFATE 1 G: 1 TABLET ORAL at 16:33

## 2022-01-01 NOTE — PROGRESS NOTES
Gastroenterology Daily Progress Note (Dr. Swathi Burns for Dr. Michele Weaver)   [address] [facility] [address] [place] Date: 12/26/2021       Subjective:       Pt in room after transfer out of ICU. Sleepy this am, voices no c/o. Current Facility-Administered Medications   Medication Dose Route Frequency    sucralfate (CARAFATE) tablet 1 g  1 g Oral ACB&D    pantoprazole (PROTONIX) tablet 40 mg  40 mg Oral ACB&D    fentaNYL citrate (PF) injection 50 mcg  50 mcg IntraVENous Q4H PRN    alum-mag hydroxide-simeth (MYLANTA) oral suspension 30 mL  30 mL Oral Q4H PRN    balsam peru-castor oiL (VENELEX) ointment   Topical BID    oxyCODONE-acetaminophen (PERCOCET) 5-325 mg per tablet 1 Tablet  1 Tablet Oral Q4H PRN    sodium chloride (NS) flush 5-40 mL  5-40 mL IntraVENous Q8H    sodium chloride (NS) flush 5-40 mL  5-40 mL IntraVENous PRN    acetaminophen (TYLENOL) tablet 650 mg  650 mg Oral Q6H PRN    Or    acetaminophen (TYLENOL) suppository 650 mg  650 mg Rectal Q6H PRN    polyethylene glycol (MIRALAX) packet 17 g  17 g Oral DAILY PRN    ondansetron (ZOFRAN) injection 4 mg  4 mg IntraVENous Q6H PRN    alcohol 62% (NOZIN) nasal  1 Ampule  1 Ampule Topical Q12H    epoetin emilie-epbx (RETACRIT) injection 20,000 Units  20,000 Units SubCUTAneous Q MON, WED & FRI        Objective:     Visit Vitals  /80 (BP 1 Location: Right lower arm, BP Patient Position: At rest)   Pulse 84   Temp 98.5 °F (36.9 °C)   Resp 18   Ht 5' 7\" (1.702 m)   Wt 65.3 kg (143 lb 15.4 oz)   SpO2 100%   BMI 22.55 kg/m²   Blood pressure 132/80, pulse 84, temperature 98.5 °F (36.9 °C), resp. rate 18, height 5' 7\" (1.702 m), weight 65.3 kg (143 lb 15.4 oz), SpO2 100 %. No intake/output data recorded. 12/30 1901 - 01/01 0700  In: 1207 [P.O.:240;  I.V.:1150]  Out: 2000       Intake/Output Summary (Last 24 hours) at 1/1/2022 0739  Last data filed at 12/31/2021 0900  Gross per 24 hour   Intake --   Output 2000 ml   Net -2000 ml Physical Exam:     General: awake and alert BM in NAD  Chest:  CTA, No rhonchi, rales or rubs. Heart: S1, S2, RRR  GI: Soft, NT, ND + bowel sounds    Labs:     Recent Results (from the past 24 hour(s))   HEMOGLOBIN    Collection Time: 12/31/21  5:22 PM   Result Value Ref Range    HGB 9.2 (L) 12.1 - 17.0 g/dL   LABRCNT(wbc:2,hgb:2,hct:2,plt:2,)  Recent Labs     12/31/21  0356 12/30/21  0420   * 136   K 5.1 4.7   CL 99 98   CO2 29 28   BUN 31* 23*   CREA 5.58* 4.49*   * 156*   CA 7.2* 7.4*   MG 2.4 2.4   PHOS 5.8* 5.1*     Lab Results   Component Value Date/Time    Folate 7.5 11/23/2011 11:26 AM   Results for Blade Holloway (MRN [id number]) as of 1/1/2022 07:38   Ref. Range 12/28/2021 22:10 12/29/2021 03:55 12/30/2021 04:20 12/31/2021 03:56 12/31/2021 17:22   HGB Latest Ref Range: 12.1 - 17.0 g/dL 9.5 (L) 9.1 (L) 8.5 (L) 8.5 (L) 9.2 (L)     Impression:    Recurrent brisk UGI bleeding felt to be due to gastric varices, linear gastric ulcers on last EGD 12/26/21    ESRD on HD    Gastric varices seen on CT and EGD but negative IR angio documenting portal     HTN    Recent COVID pneumonia    Moderate Pulm HTN         Plan:  Patient's portal pressures not c/w portal HTN therefore not candidate for TIPS placement. Therefore bleeding from gastric varices that were seen previously not an easy fix at this time. Liver is normal on U/S and CT without signs of cirrhosis. He could have had passive congestion from R sided CHF causing acute liver decompensation that has improved. Too high risk at present to do a liver biopsy.   H/H stable, no overt bleeding.    -continue current diet  -would continue daily H/H  -if he rebleeds could need a repeat EGD to r/o Dieulafoy lesion of stomach  -continue carafate for his dyspepsia along w PPI  -Dr. Dany Nielson to see again on Monday         Nahum Douglas MD    1/1/2022 [address] [facility], [address] [place] [address] [address]: [phone]

## 2022-01-01 NOTE — PROGRESS NOTES
End of Shift Note    Bedside shift change report given to Marlys Callejas (oncoming nurse) by Dafne Castro RN (offgoing nurse). Report included the following information SBAR, Kardex, ED Summary, Procedure Summary, Intake/Output and MAR    Shift worked:  [id number]     Shift summary and any significant changes:     Pt called out several times for PRN pain meds, gave a rating of 7-8 for pain in abdomen every time. Pt's Lumen was hard to flush and did not produce good blood return will inform dayshift to try to flush again and draw and if not successful then will probably need to call PICC team concerning lab work.       Concerns for physician to address: Pain management      Zone phone for oncoming shift:              Dafne Castro RN

## 2022-01-01 NOTE — PROGRESS NOTES
Nephrology Progress Note  Blaise Leblanc     [email or web address]  Phone - [phone]   Patient: Genoveva Bridges    YOB: 1975        Date- 1/1/2022   Admit Date: 12/26/2021  CC: Follow up for esrd          IMPRESSION & PLAN:   ·  esrd - home HD 5 days per week- Follows up with Dr Moise Colon at [facility]  · Covid 19 infection  · Hypotension  · Acute blood loss anemia   · H/o esophageal bleed from esophagitis  · Left arm swelling- s/p angioplasty of left subclavian vein  · Gram positive sepsis from dental abcess  · s/p lead extraction at [facility]  · Anemia of ckd  · Hx of renal tx in past times 2.  · Hypertension  · CAD  · Failed RTX times 2  · H/o DVT, s/p ivc filter/ h/o HIT    · Hyperkalemia    PLAN-   Plan for hemodialysis on Monday.  Portal pessure not elevated and was not found to be a candidate for TIPS.  Plan for fistulogram  by vascular surgery based upon OR availability.  Transfuse for hemoglobin less than 7, will continue EPO with dialysis     Subjective: Interval History:   -Seen and examined in room  -Currently awake  And alert  -Hgb stable. Objective:   Vitals:    12/31/21 1600 12/31/21 1936 12/31/21 2331 01/01/22 0714   BP:  125/83 122/63 132/80   Pulse:  73 85 84   Resp:  18 18 18   Temp:  99.3 °F (37.4 °C) 98.3 °F (36.8 °C) 98.5 °F (36.9 °C)   TempSrc:       SpO2:  92% 97% 100%   Weight:       Height: 5' 7\" (1.702 m)         12/31 0701 - 01/01 0700  In: -   Out: 2000   Last 3 Recorded Weights in this Encounter    12/26/21 0023 12/27/21 0700 12/28/21 0400   Weight: 61.2 kg (135 lb) 65.8 kg (145 lb 1 oz) 65.3 kg (143 lb 15.4 oz)      Physical exam:   GEN: NAD  NECK- Supple  RESP: no distress  NEURO:non focal  Patient examined through the glass door to minimize exposure to Covid and to preserve PPE      Chart reviewed. Pertinent Notes reviewed.      Data Review :  Recent Labs     12/31/21  0356 12/30/21  0420   * 136   K 5.1 4.7   CL 99 98 CO2 29 28   BUN 31* 23*   CREA 5.58* 4.49*   * 156*   CA 7.2* 7.4*   MG 2.4 2.4   PHOS 5.8* 5.1*     Recent Labs     12/31/21  1722 12/31/21  0356 12/30/21  0420   WBC  --  10.0 9.4   HGB 9.2* 8.5* 8.5*   HCT  --  27.8* 27.1*   PLT  --  117* 133*     No results for input(s): FE, TIBC, PSAT, FERR in the last 72 hours.    Medication list  reviewed  Current Facility-Administered Medications   Medication Dose Route Frequency    sucralfate (CARAFATE) tablet 1 g  1 g Oral ACB&D    pantoprazole (PROTONIX) tablet 40 mg  40 mg Oral ACB&D    fentaNYL citrate (PF) injection 50 mcg  50 mcg IntraVENous Q4H PRN    alum-mag hydroxide-simeth (MYLANTA) oral suspension 30 mL  30 mL Oral Q4H PRN    balsam peru-castor oiL (VENELEX) ointment   Topical BID    oxyCODONE-acetaminophen (PERCOCET) 5-325 mg per tablet 1 Tablet  1 Tablet Oral Q4H PRN    sodium chloride (NS) flush 5-40 mL  5-40 mL IntraVENous Q8H    sodium chloride (NS) flush 5-40 mL  5-40 mL IntraVENous PRN    acetaminophen (TYLENOL) tablet 650 mg  650 mg Oral Q6H PRN    Or    acetaminophen (TYLENOL) suppository 650 mg  650 mg Rectal Q6H PRN    polyethylene glycol (MIRALAX) packet 17 g  17 g Oral DAILY PRN    ondansetron (ZOFRAN) injection 4 mg  4 mg IntraVENous Q6H PRN    alcohol 62% (NOZIN) nasal  1 Ampule  1 Ampule Topical Q12H    epoetin emilie-epbx (RETACRIT) injection 20,000 Units  20,000 Units SubCUTAneous Q MON, WED & FRI          Bob Benjamin MD              [facility]  [facility], [address]  [place], [address]  Phone - [phone]               Fax - [phone]

## 2022-01-01 NOTE — PROGRESS NOTES
End of Shift Note    Bedside shift change report given to Lorenzo Coronado RN (oncoming nurse) by Chong Pena RN (offgoing nurse). Report included the following information SBAR, Kardex and MAR    Shift worked:  7a - 7p     Shift summary and any significant changes:     Pt transferred from CCU to Oncology at 1:30 pm. PRN fentanyl given x 1. Pt up to bathroom once w/ 1 assist; pt had 1 loose BM. Concerns for physician to address:  Pt requested prn fentanyl be changed from q4h to q2h at night to help him sleep. Zone phone for oncoming shift:   [phone]       Activity:  Activity Level: Bed Rest  Number times ambulated in hallways past shift: 0  Number of times OOB to chair past shift: 0    Cardiac:   Cardiac Monitoring: No      Cardiac Rhythm: Sinus Rhythm    Access:   Current line(s): central line     Genitourinary:   Urinary status: anuric    Respiratory:   O2 Device: None (Room air)  Chronic home O2 use?: NO  Incentive spirometer at bedside: NO     GI:  Last Bowel Movement Date: 12/31/21  Current diet:  ADULT ORAL NUTRITION SUPPLEMENT Breakfast, Lunch, Dinner; Clear Liquid  ADULT DIET Regular; Low Potassium (Less than 3000 mg/day); Low Phosphorus (Less than 1000 mg)  Passing flatus: YES  Tolerating current diet: YES       Pain Management:   Patient states pain is manageable on current regimen: YES    Skin:  Yong Score: 15  Interventions: PT/OT consult and nutritional support     Patient Safety:  Fall Score:  Total Score: 5  Interventions: bed/chair alarm, gripper socks and pt to call before getting OOB  High Fall Risk: Yes    Length of Stay:  Expected LOS: 4d 9h  Actual LOS: 5      Chong Pena RN

## 2022-01-01 NOTE — PROGRESS NOTES
Hospitalist Progress Note    NAME: Mari Blevins   :  1975   MRN:  [id number]       Assessment / Plan:  Acute blood loss anemia secondary to large gastric ulcer status post blood transfusion  Gastric variceal bleed  History of ventricular tachyarrhythmia  Cardiomyopathy  Status post AICD  Hemorrhagic shock  Patient was transferred out of ICU  Status post banding  Coronary artery disease  Abdominal pain patient is on fentanyl IV requesting to change to every 3 hours. Acute hypoxemic respiratory failure status post intubation mechanical ventilation extubated 2021. Currently patient is on 2 to 3 L nasal cannula    End-stage renal disease on hemodialysis status post renal transplant x2  Chronic systolic heart failure  EF 35%. History of HIT  Avoid heparin    Left upper extremity very swollen plan vascular plans to  Do fistulogram tomorrow in the morning  Patient will remain n.p.o. after midnight  Swallow will take few days to go down  Plan is HD on Monday and patient may be discharged after that or Tuesday in the morning. Left lower extremity PAD vascular will follow as outpatient.   / Body mass index is 22.55 kg/m². Estimated discharge date: 2022  Barriers: Likely after dialysis. Code status: Full code  Prophylaxis: SCD  Recommended Disposition: Home     Subjective:     Chief Complaint / Reason for Physician Visit  Discussed with RN events overnight. Patient seen and examined at bedside comfortable no new complaints. Review of Systems:  Symptom Y/N Comments  Symptom Y/N Comments   Fever/Chills n   Chest Pain     Poor Appetite n   Edema     Cough n   Abdominal Pain     Sputum n   Joint Pain     SOB/ZAMUDIO n   Pruritis/Rash     Nausea/vomit n   Tolerating PT/OT     Diarrhea n   Tolerating Diet     Constipation n   Other       Could NOT obtain due to:      Objective:     VITALS:   Last 24hrs VS reviewed since prior progress note.  Most recent are:  Patient Vitals for the past 24 hrs:   Temp Pulse Resp BP SpO2   01/01/22 0714 98.5 °F (36.9 °C) 84 18 132/80 100 %   12/31/21 2331 98.3 °F (36.8 °C) 85 18 122/63 97 %   12/31/21 1936 99.3 °F (37.4 °C) 73 18 125/83 92 %   12/31/21 1530 98.5 °F (36.9 °C) 74 16 122/83 100 %   12/31/21 1330 98.5 °F (36.9 °C) 80 16 117/86 100 %     No intake or output data in the 24 hours ending 01/01/22 1305     I had a face to face encounter and independently examined this patient on 1/1/2022, as outlined below:  PHYSICAL EXAM:  General: WD, WN. Alert, cooperative, no acute distress    EENT:  EOMI. Anicteric sclerae. MMM  Resp:  CTA bilaterally, no wheezing or rales. No accessory muscle use  CV:  Regular  rhythm,  No edema  GI:  Soft, Non distended, Non tender. +Bowel sounds  Neurologic:  Alert and oriented X 3, normal speech,   Psych:   Good insight. Not anxious nor agitated      Reviewed most current lab test results and cultures  YES  Reviewed most current radiology test results   YES  Review and summation of old records today    NO  Reviewed patient's current orders and MAR    YES  PMH/ reviewed - no change compared to H&P  ________________________________________________________________________  Care Plan discussed with:    Comments   Patient x    Family      RN     Care Manager     Consultant                        Multidiciplinary team rounds were held today with , nursing, pharmacist and clinical coordinator. Patient's plan of care was discussed; medications were reviewed and discharge planning was addressed.      ________________________________________________________________________  Total NON critical care TIME:    Total CRITICAL CARE TIME Spent:   Minutes non procedure based      Comments   >50% of visit spent in counseling and coordination of care     ________________________________________________________________________  Marina Brown MD     Procedures: see electronic medical records for all procedures/Xrays and details which were not copied into this note but were reviewed prior to creation of Plan. LABS:  I reviewed today's most current labs and imaging studies.   Pertinent labs include:  Recent Labs     12/31/21  1722 12/31/21 0356 12/30/21  0420   WBC  --  10.0 9.4   HGB 9.2* 8.5* 8.5*   HCT  --  27.8* 27.1*   PLT  --  117* 133*     Recent Labs     12/31/21 0356 12/30/21  0420   * 136   K 5.1 4.7   CL 99 98   CO2 29 28   * 156*   BUN 31* 23*   CREA 5.58* 4.49*   CA 7.2* 7.4*   MG 2.4 2.4   PHOS 5.8* 5.1*       Signed: Wei Banerjee MD

## 2022-01-01 NOTE — PROGRESS NOTES
Vascular    No more bleeding  VSS  LUE very swollen  Doubt he will be able to cannulate AVG at home  Will do fistulogram tomorrow AM  NPO after MN  Swelling will take a few days to go down  Would plan on HD here Monday  Will likely be ready for discharge after HD Monday or Tuesday AM  I think edema will have improved enough for home HD after that  Will follow up LLE PAD as outpatient

## 2022-01-01 NOTE — PROGRESS NOTES
Problem: Falls - Risk of  Goal: *Absence of Falls  Description: Document [place] Fail Fall Risk and appropriate interventions in the flowsheet.   Outcome: Progressing Towards Goal  Note: Fall Risk Interventions:  Mobility Interventions: Bed/chair exit alarm         Medication Interventions: Bed/chair exit alarm    Elimination Interventions: Call light in reach    History of Falls Interventions: Room close to nurse's station

## 2022-01-01 NOTE — PROGRESS NOTES
0700  Report from [address] [address] [address]  Percocet given due to pain see MAR    0800  Assessment complete  Patient awake alert oriented X4   C/O chronic abdominal pain and indigestion. Dialysis in progress at this time to be completed by 9am.  Orders noted from Vascular NP for pt to be NPO and need consent for Fistulogram.  Right Femoral quad lumen in place. 2799  Fentanyl given for pain see MAR    1045  Patient c/o of nausea Zofran given see MAR    1200  Assessment complete no changes    1229  Fentanyl given for pain see MAR    1245  Report given to [facility] Oncology floor for transfer to room 1121    1300  Femoral line dressing change due to be wet.     1330  To room 1121 via w/c

## 2022-01-02 ENCOUNTER — APPOINTMENT (OUTPATIENT)
Dept: GENERAL RADIOLOGY | Age: 47
DRG: 270 | End: 2022-01-02
Attending: SURGERY
Payer: MEDICARE

## 2022-01-02 ENCOUNTER — ANESTHESIA EVENT (OUTPATIENT)
Dept: SURGERY | Age: 47
DRG: 270 | End: 2022-01-02
Payer: MEDICARE

## 2022-01-02 ENCOUNTER — ANESTHESIA (OUTPATIENT)
Dept: SURGERY | Age: 47
DRG: 270 | End: 2022-01-02
Payer: MEDICARE

## 2022-01-02 PROCEDURE — C1894 INTRO/SHEATH, NON-LASER: HCPCS | Performed by: SURGERY

## 2022-01-02 PROCEDURE — 76000 FLUOROSCOPY <1 HR PHYS/QHP: CPT

## 2022-01-02 PROCEDURE — 74011000636 HC RX REV CODE- 636: Performed by: SURGERY

## 2022-01-02 PROCEDURE — 74011000250 HC RX REV CODE- 250: Performed by: SURGERY

## 2022-01-02 PROCEDURE — C1769 GUIDE WIRE: HCPCS | Performed by: SURGERY

## 2022-01-02 PROCEDURE — 77030014008 HC SPNG HEMSTAT J&J -C: Performed by: SURGERY

## 2022-01-02 PROCEDURE — 76010000138 HC OR TIME 0.5 TO 1 HR: Performed by: SURGERY

## 2022-01-02 PROCEDURE — 74011250637 HC RX REV CODE- 250/637: Performed by: INTERNAL MEDICINE

## 2022-01-02 PROCEDURE — C1725 CATH, TRANSLUMIN NON-LASER: HCPCS | Performed by: SURGERY

## 2022-01-02 PROCEDURE — 77030026438 HC STYL ET INTUB CARD -A: Performed by: NURSE ANESTHETIST, CERTIFIED REGISTERED

## 2022-01-02 PROCEDURE — 74011250636 HC RX REV CODE- 250/636: Performed by: NURSE ANESTHETIST, CERTIFIED REGISTERED

## 2022-01-02 PROCEDURE — 74011250636 HC RX REV CODE- 250/636: Performed by: INTERNAL MEDICINE

## 2022-01-02 PROCEDURE — 77030003703 HC NDL VASC ACC COOK -A: Performed by: SURGERY

## 2022-01-02 PROCEDURE — 74011250637 HC RX REV CODE- 250/637: Performed by: SURGERY

## 2022-01-02 PROCEDURE — 74011250637 HC RX REV CODE- 250/637: Performed by: SPECIALIST

## 2022-01-02 PROCEDURE — 74011250636 HC RX REV CODE- 250/636: Performed by: SURGERY

## 2022-01-02 PROCEDURE — 76210000006 HC OR PH I REC 0.5 TO 1 HR: Performed by: SURGERY

## 2022-01-02 PROCEDURE — 77030008684 HC TU ET CUF COVD -B: Performed by: NURSE ANESTHETIST, CERTIFIED REGISTERED

## 2022-01-02 PROCEDURE — 74011250637 HC RX REV CODE- 250/637: Performed by: NURSE PRACTITIONER

## 2022-01-02 PROCEDURE — 74011000250 HC RX REV CODE- 250: Performed by: NURSE ANESTHETIST, CERTIFIED REGISTERED

## 2022-01-02 PROCEDURE — 73060 X-RAY EXAM OF HUMERUS: CPT

## 2022-01-02 PROCEDURE — 74011250636 HC RX REV CODE- 250/636: Performed by: ANESTHESIOLOGY

## 2022-01-02 PROCEDURE — 74011250636 HC RX REV CODE- 250/636

## 2022-01-02 PROCEDURE — 2709999900 HC NON-CHARGEABLE SUPPLY: Performed by: SURGERY

## 2022-01-02 PROCEDURE — 77030002916 HC SUT ETHLN J&J -A: Performed by: SURGERY

## 2022-01-02 PROCEDURE — 77030021678 HC GLIDESCP STAT DISP VERT -B: Performed by: NURSE ANESTHETIST, CERTIFIED REGISTERED

## 2022-01-02 PROCEDURE — 76060000032 HC ANESTHESIA 0.5 TO 1 HR: Performed by: SURGERY

## 2022-01-02 PROCEDURE — 77030019908 HC STETH ESOPH SIMS -A: Performed by: NURSE ANESTHETIST, CERTIFIED REGISTERED

## 2022-01-02 PROCEDURE — B5171ZZ FLUOROSCOPY OF LEFT SUBCLAVIAN VEIN USING LOW OSMOLAR CONTRAST: ICD-10-PCS | Performed by: SURGERY

## 2022-01-02 PROCEDURE — 74011000258 HC RX REV CODE- 258: Performed by: NURSE ANESTHETIST, CERTIFIED REGISTERED

## 2022-01-02 PROCEDURE — 65270000015 HC RM PRIVATE ONCOLOGY

## 2022-01-02 PROCEDURE — 05763ZZ DILATION OF LEFT SUBCLAVIAN VEIN, PERCUTANEOUS APPROACH: ICD-10-PCS | Performed by: SURGERY

## 2022-01-02 RX ORDER — LIDOCAINE HYDROCHLORIDE AND EPINEPHRINE 10; 10 MG/ML; UG/ML
INJECTION, SOLUTION INFILTRATION; PERINEURAL AS NEEDED
Status: DISCONTINUED | OUTPATIENT
Start: 2022-01-02 | End: 2022-01-02 | Stop reason: HOSPADM

## 2022-01-02 RX ORDER — HYDROMORPHONE HYDROCHLORIDE 1 MG/ML
.2-.5 INJECTION, SOLUTION INTRAMUSCULAR; INTRAVENOUS; SUBCUTANEOUS
Status: DISCONTINUED | OUTPATIENT
Start: 2022-01-02 | End: 2022-01-02 | Stop reason: HOSPADM

## 2022-01-02 RX ORDER — SODIUM CHLORIDE, SODIUM LACTATE, POTASSIUM CHLORIDE, CALCIUM CHLORIDE 600; 310; 30; 20 MG/100ML; MG/100ML; MG/100ML; MG/100ML
25 INJECTION, SOLUTION INTRAVENOUS CONTINUOUS
Status: DISCONTINUED | OUTPATIENT
Start: 2022-01-02 | End: 2022-01-02 | Stop reason: HOSPADM

## 2022-01-02 RX ORDER — CEFAZOLIN SODIUM 1 G/3ML
INJECTION, POWDER, FOR SOLUTION INTRAMUSCULAR; INTRAVENOUS
Status: DISPENSED
Start: 2022-01-02 | End: 2022-01-02

## 2022-01-02 RX ORDER — FENTANYL CITRATE 50 UG/ML
25 INJECTION, SOLUTION INTRAMUSCULAR; INTRAVENOUS
Status: DISCONTINUED | OUTPATIENT
Start: 2022-01-02 | End: 2022-01-02 | Stop reason: HOSPADM

## 2022-01-02 RX ORDER — FENTANYL CITRATE 50 UG/ML
INJECTION, SOLUTION INTRAMUSCULAR; INTRAVENOUS AS NEEDED
Status: DISCONTINUED | OUTPATIENT
Start: 2022-01-02 | End: 2022-01-02 | Stop reason: HOSPADM

## 2022-01-02 RX ORDER — LIDOCAINE HYDROCHLORIDE 20 MG/ML
INJECTION, SOLUTION EPIDURAL; INFILTRATION; INTRACAUDAL; PERINEURAL AS NEEDED
Status: DISCONTINUED | OUTPATIENT
Start: 2022-01-02 | End: 2022-01-02 | Stop reason: HOSPADM

## 2022-01-02 RX ORDER — ROCURONIUM BROMIDE 10 MG/ML
INJECTION, SOLUTION INTRAVENOUS AS NEEDED
Status: DISCONTINUED | OUTPATIENT
Start: 2022-01-02 | End: 2022-01-02 | Stop reason: HOSPADM

## 2022-01-02 RX ORDER — ONDANSETRON 2 MG/ML
INJECTION INTRAMUSCULAR; INTRAVENOUS AS NEEDED
Status: DISCONTINUED | OUTPATIENT
Start: 2022-01-02 | End: 2022-01-02 | Stop reason: HOSPADM

## 2022-01-02 RX ORDER — FENTANYL CITRATE 50 UG/ML
50 INJECTION, SOLUTION INTRAMUSCULAR; INTRAVENOUS AS NEEDED
Status: DISCONTINUED | OUTPATIENT
Start: 2022-01-02 | End: 2022-01-02 | Stop reason: HOSPADM

## 2022-01-02 RX ORDER — SODIUM CHLORIDE 9 MG/ML
INJECTION, SOLUTION INTRAVENOUS
Status: DISCONTINUED | OUTPATIENT
Start: 2022-01-02 | End: 2022-01-02 | Stop reason: HOSPADM

## 2022-01-02 RX ORDER — LIDOCAINE HYDROCHLORIDE 10 MG/ML
0.1 INJECTION, SOLUTION EPIDURAL; INFILTRATION; INTRACAUDAL; PERINEURAL AS NEEDED
Status: DISCONTINUED | OUTPATIENT
Start: 2022-01-02 | End: 2022-01-02 | Stop reason: HOSPADM

## 2022-01-02 RX ORDER — PROPOFOL 10 MG/ML
INJECTION, EMULSION INTRAVENOUS AS NEEDED
Status: DISCONTINUED | OUTPATIENT
Start: 2022-01-02 | End: 2022-01-02 | Stop reason: HOSPADM

## 2022-01-02 RX ORDER — NEOSTIGMINE METHYLSULFATE 1 MG/ML
INJECTION, SOLUTION INTRAVENOUS AS NEEDED
Status: DISCONTINUED | OUTPATIENT
Start: 2022-01-02 | End: 2022-01-02 | Stop reason: HOSPADM

## 2022-01-02 RX ORDER — DIPHENHYDRAMINE HYDROCHLORIDE 50 MG/ML
INJECTION, SOLUTION INTRAMUSCULAR; INTRAVENOUS AS NEEDED
Status: DISCONTINUED | OUTPATIENT
Start: 2022-01-02 | End: 2022-01-02 | Stop reason: HOSPADM

## 2022-01-02 RX ORDER — MIDAZOLAM HYDROCHLORIDE 1 MG/ML
INJECTION, SOLUTION INTRAMUSCULAR; INTRAVENOUS AS NEEDED
Status: DISCONTINUED | OUTPATIENT
Start: 2022-01-02 | End: 2022-01-02 | Stop reason: HOSPADM

## 2022-01-02 RX ORDER — GLYCOPYRROLATE 0.2 MG/ML
INJECTION INTRAMUSCULAR; INTRAVENOUS AS NEEDED
Status: DISCONTINUED | OUTPATIENT
Start: 2022-01-02 | End: 2022-01-02 | Stop reason: HOSPADM

## 2022-01-02 RX ORDER — MIDAZOLAM HYDROCHLORIDE 1 MG/ML
1 INJECTION, SOLUTION INTRAMUSCULAR; INTRAVENOUS AS NEEDED
Status: DISCONTINUED | OUTPATIENT
Start: 2022-01-02 | End: 2022-01-02 | Stop reason: HOSPADM

## 2022-01-02 RX ORDER — FENTANYL CITRATE 50 UG/ML
INJECTION, SOLUTION INTRAMUSCULAR; INTRAVENOUS
Status: COMPLETED
Start: 2022-01-02 | End: 2022-01-02

## 2022-01-02 RX ORDER — WATER FOR INJECTION,STERILE
VIAL (ML) INJECTION
Status: DISPENSED
Start: 2022-01-02 | End: 2022-01-02

## 2022-01-02 RX ADMIN — LIDOCAINE HYDROCHLORIDE 40 MG: 20 INJECTION, SOLUTION EPIDURAL; INFILTRATION; INTRACAUDAL; PERINEURAL at 09:33

## 2022-01-02 RX ADMIN — GLYCOPYRROLATE 0.4 MG: 0.2 INJECTION, SOLUTION INTRAMUSCULAR; INTRAVENOUS at 10:07

## 2022-01-02 RX ADMIN — METHYLPREDNISOLONE SODIUM SUCCINATE 125 MG: 125 INJECTION, POWDER, FOR SOLUTION INTRAMUSCULAR; INTRAVENOUS at 09:38

## 2022-01-02 RX ADMIN — PHENYLEPHRINE HYDROCHLORIDE 160 MCG: 10 INJECTION INTRAVENOUS at 09:39

## 2022-01-02 RX ADMIN — PANTOPRAZOLE SODIUM 40 MG: 40 TABLET, DELAYED RELEASE ORAL at 17:54

## 2022-01-02 RX ADMIN — ROCURONIUM BROMIDE 30 MG: 10 INJECTION INTRAVENOUS at 09:33

## 2022-01-02 RX ADMIN — PHENYLEPHRINE HYDROCHLORIDE 60 MCG/MIN: 10 INJECTION INTRAVENOUS at 09:37

## 2022-01-02 RX ADMIN — PROPOFOL 30 MG: 10 INJECTION, EMULSION INTRAVENOUS at 09:35

## 2022-01-02 RX ADMIN — FENTANYL CITRATE 25 MCG: 50 INJECTION INTRAMUSCULAR; INTRAVENOUS at 10:55

## 2022-01-02 RX ADMIN — FENTANYL CITRATE 25 MCG: 50 INJECTION, SOLUTION INTRAMUSCULAR; INTRAVENOUS at 10:50

## 2022-01-02 RX ADMIN — SODIUM CHLORIDE: 0.9 INJECTION, SOLUTION INTRAVENOUS at 09:22

## 2022-01-02 RX ADMIN — Medication 3 AMPULE: at 09:00

## 2022-01-02 RX ADMIN — FENTANYL CITRATE 50 MCG: 0.05 INJECTION, SOLUTION INTRAMUSCULAR; INTRAVENOUS at 22:09

## 2022-01-02 RX ADMIN — FENTANYL CITRATE 50 MCG: 0.05 INJECTION, SOLUTION INTRAMUSCULAR; INTRAVENOUS at 18:11

## 2022-01-02 RX ADMIN — Medication 1 AMPULE: at 09:00

## 2022-01-02 RX ADMIN — FENTANYL CITRATE 50 MCG: 0.05 INJECTION, SOLUTION INTRAMUSCULAR; INTRAVENOUS at 01:03

## 2022-01-02 RX ADMIN — FENTANYL CITRATE 25 MCG: 50 INJECTION INTRAMUSCULAR; INTRAVENOUS at 10:45

## 2022-01-02 RX ADMIN — OXYCODONE AND ACETAMINOPHEN 1 TABLET: 5; 325 TABLET ORAL at 00:13

## 2022-01-02 RX ADMIN — PROPOFOL 100 MG: 10 INJECTION, EMULSION INTRAVENOUS at 09:33

## 2022-01-02 RX ADMIN — Medication 3 MG: at 10:07

## 2022-01-02 RX ADMIN — FENTANYL CITRATE 50 MCG: 0.05 INJECTION, SOLUTION INTRAMUSCULAR; INTRAVENOUS at 14:16

## 2022-01-02 RX ADMIN — FENTANYL CITRATE 50 MCG: 0.05 INJECTION, SOLUTION INTRAMUSCULAR; INTRAVENOUS at 05:41

## 2022-01-02 RX ADMIN — MIDAZOLAM HYDROCHLORIDE 1 MG: 1 INJECTION, SOLUTION INTRAMUSCULAR; INTRAVENOUS at 09:25

## 2022-01-02 RX ADMIN — PHENYLEPHRINE HYDROCHLORIDE 80 MCG: 10 INJECTION INTRAVENOUS at 09:33

## 2022-01-02 RX ADMIN — SUCRALFATE 1 G: 1 TABLET ORAL at 17:54

## 2022-01-02 RX ADMIN — OXYCODONE AND ACETAMINOPHEN 1 TABLET: 5; 325 TABLET ORAL at 17:53

## 2022-01-02 RX ADMIN — FENTANYL CITRATE 25 MCG: 50 INJECTION, SOLUTION INTRAMUSCULAR; INTRAVENOUS at 09:33

## 2022-01-02 RX ADMIN — ONDANSETRON HYDROCHLORIDE 4 MG: 2 INJECTION, SOLUTION INTRAMUSCULAR; INTRAVENOUS at 10:07

## 2022-01-02 RX ADMIN — FENTANYL CITRATE 25 MCG: 50 INJECTION, SOLUTION INTRAMUSCULAR; INTRAVENOUS at 10:27

## 2022-01-02 RX ADMIN — SODIUM CHLORIDE, PRESERVATIVE FREE 10 ML: 5 INJECTION INTRAVENOUS at 06:21

## 2022-01-02 RX ADMIN — WATER 2 G: 1 INJECTION INTRAMUSCULAR; INTRAVENOUS; SUBCUTANEOUS at 09:36

## 2022-01-02 RX ADMIN — FENTANYL CITRATE 25 MCG: 50 INJECTION INTRAMUSCULAR; INTRAVENOUS at 10:50

## 2022-01-02 RX ADMIN — FENTANYL CITRATE 25 MCG: 50 INJECTION INTRAMUSCULAR; INTRAVENOUS at 11:00

## 2022-01-02 RX ADMIN — DIPHENHYDRAMINE HYDROCHLORIDE 12.5 MG: 50 INJECTION, SOLUTION INTRAMUSCULAR; INTRAVENOUS at 09:38

## 2022-01-02 NOTE — PERIOP NOTES
TRANSFER - IN REPORT:    Verbal report received from [facility]) on Ermelinda Landrum  being received from 1121(unit) for ordered procedure      Report consisted of patients Situation, Background, Assessment and   Recommendations(SBAR). Information from the following report(s) SBAR, Intake/Output, MAR, Recent Results and Cardiac Rhythm NSR was reviewed with the receiving nurse. Opportunity for questions and clarification was provided. Assessment completed upon patients arrival to unit and care assumed.

## 2022-01-02 NOTE — ANESTHESIA POSTPROCEDURE EVALUATION
Procedure(s):  ANGIOPLASTY AND ANGIOGRAM LEFT ARTERIOVENOUS GRAFT. general    Anesthesia Post Evaluation      Multimodal analgesia: multimodal analgesia used between 6 hours prior to anesthesia start to PACU discharge  Patient location during evaluation: PACU  Level of consciousness: sleepy but conscious and responsive to verbal stimuli  Pain score: 2  Pain management: adequate  Airway patency: patent  Anesthetic complications: no  Cardiovascular status: acceptable  Respiratory status: acceptable  Hydration status: acceptable  Comments: +Post-Anesthesia Evaluation and Assessment    Patient: Favio Lemus MRN: [id number]  SSN: [id number]   YOB: 1975  Age: 42 y.o. Sex: male      Cardiovascular Function/Vital Signs    /68 (BP 1 Location: Right lower arm, BP Patient Position: At rest)   Pulse 75   Temp 36.4 °C (97.6 °F)   Resp 12   Ht 5' 7\" (1.702 m)   Wt 65.3 kg (143 lb 15.4 oz)   SpO2 100%   BMI 22.55 kg/m²     Patient is status post Procedure(s):  ANGIOPLASTY AND ANGIOGRAM LEFT ARTERIOVENOUS GRAFT. Nausea/Vomiting: Controlled. Postoperative hydration reviewed and adequate. Pain:  Pain Scale 1: Numeric (0 - 10) (01/02/22 1100)  Pain Intensity 1: 7 (01/02/22 1100)   Managed. Neurological Status:   Neuro (WDL): Within Defined Limits (01/02/22 1025)   At baseline. Mental Status and Level of Consciousness: Arousable. Pulmonary Status:   O2 Device: Nasal cannula (01/02/22 1025)   Adequate oxygenation and airway patent. Complications related to anesthesia: None    Post-anesthesia assessment completed. No concerns.     Signed By: Martin Grey MD    1/2/2022  Post anesthesia nausea and vomiting:  controlled  Final Post Anesthesia Temperature Assessment:  Normothermia (36.0-37.5 degrees C)      INITIAL Post-op Vital signs:   Vitals Value Taken Time   /68 01/02/22 1100   Temp 36.4 °C (97.6 °F) 01/02/22 1025   Pulse 77 01/02/22 1114   Resp 12 01/02/22 1100   SpO2 100 % 01/02/22 1114   Vitals shown include unvalidated device data.

## 2022-01-02 NOTE — PROGRESS NOTES
Pt constantly calling out for pain medication Pt wants medication to be increased or timing spaced closer. Pt is wanting to be re-assessed for stomach ulcer. End of shift report given to the following oncoming RN, Omayra Sanchez from [facility] Marin, outgoing RN. Report includes SBAR, MAR, Kardex, and recent results.

## 2022-01-02 NOTE — PERIOP NOTES
TRANSFER - IN REPORT:    Verbal report received from [address] [place] [address]  on Cresencio Turpin  being received from OR for routine post - op      Report consisted of patients Situation, Background, Assessment and   Recommendations(SBAR). Information from the following report(s) OR Summary, Procedure Summary, Intake/Output and MAR was reviewed with the receiving nurse. Opportunity for questions and clarification was provided. Assessment completed upon patients arrival to unit and care assumed.

## 2022-01-02 NOTE — BRIEF OP NOTE
Brief Postoperative Note    Patient: Spike Vidal  YOB: 1975  MRN: [id number]    Date of Procedure: 1/2/2022     Pre-Op Diagnosis: LEFT ARM ARTEROVENOUS GRAFT W/ CENTRAL VENOUS STENOSIS    Post-Op Diagnosis: Same as preoperative diagnosis. Procedure(s):  ANGIOPLASTY AND ANGIOGRAM LEFT ARTERIOVENOUS GRAFT    Surgeon(s):  Leo Felix MD    Surgical Assistant: Surg Asst-1: Cleopatra Whelan    Anesthesia: General     Estimated Blood Loss (mL): Minimal    Complications: None    Specimens: * No specimens in log *     Implants: * No implants in log *    Drains:   [REMOVED] Orogastric Tube 12/06/21 (Removed)       [REMOVED] Orogastric Tube 12/08/21 (Removed)       Findings: sTENOSIS AT VENOUS ANASTOMOSIS pta'D W/ 8X8. oCCLUDED scv pta'D W/ 12X4. Good result w/ no residual stenosis. Should be OK for discharge after HD tomorrow.     Electronically Signed by Beth Neves MD on 1/2/2022 at 11:06 AM

## 2022-01-02 NOTE — PROGRESS NOTES
End of Shift Note    Bedside shift change report given to MANUELITO Bennett (oncoming nurse) by Thea Chino RN (offgoing nurse). Report included the following information SBAR, Kardex, Intake/Output and MAR    Shift worked:  [id number]     Shift summary and any significant changes:     Pt requested pain medications reported a 7 with good effects after prn med given. Pt up to restroom walker one assist. Pt reports abdominal pain. Concerns for physician to address:  Pt want to be assessed for stomach ulcer. Zone phone for oncoming shift:   none            Skin:  Yong Score: 16  Interventions: speciality bed and nutritional support     Patient Safety:  Fall Score:  Total Score: 5  Interventions: assistive device (walker, cane, etc), pt to call before getting OOB and stay with me (per policy)  High Fall Risk: Yes    Length of Stay:  Expected LOS: 4d 9h  Actual LOS: 6      Thea Chino RN

## 2022-01-02 NOTE — ANESTHESIA PREPROCEDURE EVALUATION
Relevant Problems   RESPIRATORY SYSTEM   (+) Pneumonia      CARDIOVASCULAR   (+) CAD (coronary artery disease)   (+) Congestive heart failure, unspecified   (+) Coronary atherosclerosis of native coronary artery   (+) HTN (hypertension)      RENAL FAILURE   (+) ESRD (end stage renal disease) (HCC)   (+) ESRD (end stage renal disease) on dialysis (HCC)      HEMATOLOGY   (+) Anemia       Anesthetic History     PONV          Review of Systems / Medical History  Patient summary reviewed, nursing notes reviewed and pertinent labs reviewed    Pulmonary          Pneumonia         Neuro/Psych     seizures         Cardiovascular    Hypertension        Dysrhythmias   CAD    Exercise tolerance: >4 METS     GI/Hepatic/Renal     GERD           Endo/Other  Within defined limits           Other Findings   Comments: Recent COVID +           Physical Exam    Airway  Mallampati: III  TM Distance: 4 - 6 cm  Neck ROM: normal range of motion   Mouth opening: Normal     Cardiovascular  Regular rate and rhythm,  S1 and S2 normal,  no murmur, click, rub, or gallop  Rhythm: regular  Rate: normal         Dental    Dentition: Poor dentition     Pulmonary  Breath sounds clear to auscultation               Abdominal  GI exam deferred       Other Findings            Anesthetic Plan    ASA: 3, emergent  Anesthesia type: general    Monitoring Plan: BIS      Induction: Intravenous  Anesthetic plan and risks discussed with: Patient

## 2022-01-02 NOTE — OP NOTES
[address]  OPERATIVE REPORT    Name:  Jerica Lim  MR#:  [id number]  :  1975  ACCOUNT #:  [de-identified]  DATE OF SERVICE:  2022      PREOPERATIVE DIAGNOSIS:  Left arm dialysis graft with recurrent central venous stenosis and left arm swelling. POSTOPERATIVE DIAGNOSIS:  Left arm dialysis graft with recurrent central venous stenosis and left arm swelling. PROCEDURE PERFORMED:  1. Angiogram with angioplasty of left arm dialysis graft. 2.  Angioplasty of left subclavian vein. SURGEON:  Rose Marie Puga MD    ASSISTANT:  None. ANESTHESIA:  MAC.    COMPLICATIONS:  None. SPECIMENS REMOVED:  None. IMPLANTS:  None. ESTIMATED BLOOD LOSS:  Minimal.    INDICATIONS:  The patient is a 82-year-old male with multiple medical problems who has end-stage renal disease and a left upper arm dialysis graft. He has a history of recurrent left subclavian vein stenosis. He now presents with recurrent severe arm swelling which is making it difficult to access the left arm graft. He will undergo angiography with possible intervention. PROCEDURE:  After informed consent was obtained, the patient was given intravenous antibiotics within 1 hour of the start of the procedure and taken to the operating room. After establishing adequate sedation, the left arm was prepped and draped as a sterile field. The arm was severely swollen. There is a palpable upper arm loop graft. The arterial side is lateral.  Under local anesthesia, the graft was cannulated on the arterial side and a 7-Syriac sheath was placed without difficulty. The patient has a CONTRAST ALLERGY and was premedicated with Benadryl and Solu-Medrol. An angiogram of the graft was done injecting through the sheath and showed that there was mild stenosis throughout the graft, but there was a 60% stenosis at the venous anastomosis. The drainage of the graft is via the proximal brachial vein.   The proximal brachial veins and axillary veins are normal.  The subclavian vein was completely occluded at the thoracic inlet. A wire was advanced through the graft and through the stenosis at the venous anastomosis. The venous anastomosis was angioplastied with an 8 x 8 balloon. The remainder of the graft was also angioplastied with the 8 x 8 balloon. Repeat angiogram demonstrated an excellent result with no residual stenosis in the graft or at the venous anastomosis. Next, the subclavian vein occlusion was crossed without difficulty with a Glidewire and it was angioplastied with a 12 x 4 [place] balloon. Repeat angiogram demonstrated an excellent result with no residual stenosis. The left internal jugular vein is patent. This is a poor location for a stent. In addition to stent strut fracture from compression between the first rib and clavicle, the stent would likely cover the inflow from the jugular vein which may make it impossible to place a HeRO vascular access device in the future. The sheath was removed and the puncture site was controlled with a 3-0 nylon suture. A dry dressing was applied and the patient was transferred to the recovery area in stable condition having tolerated the procedure well. His left arm swelling should improve dramatically over the next 24-48 hours.         MD VIKAS [email or web address]  D:  01/02/2022 11:15  T:  01/02/2022 12:19  JOB #:  [id number]

## 2022-01-03 ENCOUNTER — APPOINTMENT (OUTPATIENT)
Dept: ULTRASOUND IMAGING | Age: 47
DRG: 270 | End: 2022-01-03
Attending: SURGERY
Payer: MEDICARE

## 2022-01-03 LAB
ERYTHROCYTE [DISTWIDTH] IN BLOOD BY AUTOMATED COUNT: 16.4 % (ref 11.5–14.5)
HCT VFR BLD AUTO: 27.4 % (ref 36.6–50.3)
HGB BLD-MCNC: 8.9 G/DL (ref 12.1–17)
MAGNESIUM SERPL-MCNC: 2.3 MG/DL (ref 1.6–2.4)
MCH RBC QN AUTO: 30.8 PG (ref 26–34)
MCHC RBC AUTO-ENTMCNC: 32.5 G/DL (ref 30–36.5)
MCV RBC AUTO: 94.8 FL (ref 80–99)
NRBC # BLD: 0 K/UL (ref 0–0.01)
NRBC BLD-RTO: 0 PER 100 WBC
PHOSPHATE SERPL-MCNC: 4.2 MG/DL (ref 2.6–4.7)
PLATELET # BLD AUTO: 115 K/UL (ref 150–400)
PMV BLD AUTO: 12.1 FL (ref 8.9–12.9)
RBC # BLD AUTO: 2.89 M/UL (ref 4.1–5.7)
WBC # BLD AUTO: 12.1 K/UL (ref 4.1–11.1)

## 2022-01-03 PROCEDURE — 74011250637 HC RX REV CODE- 250/637: Performed by: INTERNAL MEDICINE

## 2022-01-03 PROCEDURE — 74011250636 HC RX REV CODE- 250/636: Performed by: INTERNAL MEDICINE

## 2022-01-03 PROCEDURE — 83735 ASSAY OF MAGNESIUM: CPT

## 2022-01-03 PROCEDURE — 65270000015 HC RM PRIVATE ONCOLOGY

## 2022-01-03 PROCEDURE — 74011250636 HC RX REV CODE- 250/636: Performed by: NURSE PRACTITIONER

## 2022-01-03 PROCEDURE — 93971 EXTREMITY STUDY: CPT

## 2022-01-03 PROCEDURE — 85027 COMPLETE CBC AUTOMATED: CPT

## 2022-01-03 PROCEDURE — 74011000250 HC RX REV CODE- 250: Performed by: NURSE PRACTITIONER

## 2022-01-03 PROCEDURE — 74011250637 HC RX REV CODE- 250/637: Performed by: SPECIALIST

## 2022-01-03 PROCEDURE — 84100 ASSAY OF PHOSPHORUS: CPT

## 2022-01-03 PROCEDURE — 74011250637 HC RX REV CODE- 250/637: Performed by: NURSE PRACTITIONER

## 2022-01-03 PROCEDURE — 90935 HEMODIALYSIS ONE EVALUATION: CPT

## 2022-01-03 PROCEDURE — 36415 COLL VENOUS BLD VENIPUNCTURE: CPT

## 2022-01-03 RX ORDER — SUCRALFATE 1 G/1
1 TABLET ORAL
Status: DISCONTINUED | OUTPATIENT
Start: 2022-01-03 | End: 2022-01-18 | Stop reason: HOSPADM

## 2022-01-03 RX ORDER — HYDROMORPHONE HYDROCHLORIDE 1 MG/ML
0.5 INJECTION, SOLUTION INTRAMUSCULAR; INTRAVENOUS; SUBCUTANEOUS ONCE
Status: COMPLETED | OUTPATIENT
Start: 2022-01-03 | End: 2022-01-03

## 2022-01-03 RX ADMIN — FENTANYL CITRATE 50 MCG: 0.05 INJECTION, SOLUTION INTRAMUSCULAR; INTRAVENOUS at 14:48

## 2022-01-03 RX ADMIN — SODIUM CHLORIDE, PRESERVATIVE FREE 10 ML: 5 INJECTION INTRAVENOUS at 21:07

## 2022-01-03 RX ADMIN — FENTANYL CITRATE 50 MCG: 0.05 INJECTION, SOLUTION INTRAMUSCULAR; INTRAVENOUS at 18:52

## 2022-01-03 RX ADMIN — Medication: at 08:42

## 2022-01-03 RX ADMIN — SUCRALFATE 1 G: 1 TABLET ORAL at 16:25

## 2022-01-03 RX ADMIN — EPOETIN ALFA-EPBX 20000 UNITS: 20000 INJECTION, SOLUTION INTRAVENOUS; SUBCUTANEOUS at 23:10

## 2022-01-03 RX ADMIN — SODIUM CHLORIDE, PRESERVATIVE FREE 10 ML: 5 INJECTION INTRAVENOUS at 14:00

## 2022-01-03 RX ADMIN — SUCRALFATE 1 G: 1 TABLET ORAL at 08:34

## 2022-01-03 RX ADMIN — FENTANYL CITRATE 50 MCG: 0.05 INJECTION, SOLUTION INTRAMUSCULAR; INTRAVENOUS at 02:15

## 2022-01-03 RX ADMIN — FENTANYL CITRATE 50 MCG: 0.05 INJECTION, SOLUTION INTRAMUSCULAR; INTRAVENOUS at 23:02

## 2022-01-03 RX ADMIN — HYDROMORPHONE HYDROCHLORIDE 0.5 MG: 1 INJECTION, SOLUTION INTRAMUSCULAR; INTRAVENOUS; SUBCUTANEOUS at 21:07

## 2022-01-03 RX ADMIN — Medication: at 20:20

## 2022-01-03 RX ADMIN — PANTOPRAZOLE SODIUM 40 MG: 40 TABLET, DELAYED RELEASE ORAL at 08:34

## 2022-01-03 RX ADMIN — ONDANSETRON 4 MG: 2 INJECTION INTRAMUSCULAR; INTRAVENOUS at 21:07

## 2022-01-03 RX ADMIN — Medication 1 AMPULE: at 08:34

## 2022-01-03 RX ADMIN — ALUMINUM HYDROXIDE, MAGNESIUM HYDROXIDE, AND SIMETHICONE 30 ML: 200; 200; 20 SUSPENSION ORAL at 16:25

## 2022-01-03 RX ADMIN — PANTOPRAZOLE SODIUM 40 MG: 40 TABLET, DELAYED RELEASE ORAL at 16:25

## 2022-01-03 RX ADMIN — ALUMINUM HYDROXIDE, MAGNESIUM HYDROXIDE, AND SIMETHICONE 30 ML: 200; 200; 20 SUSPENSION ORAL at 09:29

## 2022-01-03 RX ADMIN — Medication 1 AMPULE: at 21:07

## 2022-01-03 RX ADMIN — FENTANYL CITRATE 50 MCG: 0.05 INJECTION, SOLUTION INTRAMUSCULAR; INTRAVENOUS at 06:14

## 2022-01-03 RX ADMIN — SODIUM CHLORIDE, PRESERVATIVE FREE 10 ML: 5 INJECTION INTRAVENOUS at 06:00

## 2022-01-03 RX ADMIN — SUCRALFATE 1 G: 1 TABLET ORAL at 11:08

## 2022-01-03 RX ADMIN — FENTANYL CITRATE 50 MCG: 0.05 INJECTION, SOLUTION INTRAMUSCULAR; INTRAVENOUS at 11:08

## 2022-01-03 RX ADMIN — SUCRALFATE 1 G: 1 TABLET ORAL at 23:02

## 2022-01-03 NOTE — PROGRESS NOTES
End of Shift Note    Bedside shift change report given to Issac Harris RN (oncoming nurse) by Rinku Ledezma RN (offgoing nurse). Report included the following information SBAR, Kardex and MAR    Shift worked:  7a - 7p     Shift summary and any significant changes:     PRN fentanyl given x 3 for chronic abd pain. PRN mylanta given x 2 for indigestion. Pt educated on opioid use, GI side effects, dependency, and possible need to wean down/off opioids by GI Dr. Dawson Alcocer. HD at bedside in pt's room. Ordered labs drawn by dialysis RN b/c fem line had no blood return. Per Dr. Najma Marcus, pt's fem line might need to be removed, will have RLE u/s first; if fem line is to be removed, then new IV access needs to be done beforehand. Wound care performed on sacrum and tops of 2nd and 3rd L toes. Dressing changed on fem line and site near fem line d/t dressing became saturated w/ clear drainage. Concerns for physician to address:       Zone phone for oncoming shift:   [phone]       Activity:  Activity Level: Bed Rest  Number times ambulated in hallways past shift: 0  Number of times OOB to chair past shift: 0    Cardiac:   Cardiac Monitoring: No      Cardiac Rhythm: Sinus Rhythm    Access:   Current line(s): central line     Genitourinary:   Urinary status: anuric    Respiratory:   O2 Device: None (Room air)  Chronic home O2 use?: NO  Incentive spirometer at bedside: NO     GI:  Last Bowel Movement Date: 01/01/21  Current diet:  ADULT DIET Regular; Low Potassium (Less than 3000 mg/day); Low Phosphorus (Less than 1000 mg)  ADULT ORAL NUTRITION SUPPLEMENT Lunch; Clear Liquid  ADULT ORAL NUTRITION SUPPLEMENT Breakfast, Dinner; Renal Supplement  Passing flatus: YES  Tolerating current diet: YES       Pain Management:   Patient states pain is manageable on current regimen: YES    Skin:  Yong Score: 15  Interventions: increase time out of bed, foam dressing, PT/OT consult and nutritional support     Patient Safety:  Fall Score:  Total Score: 5  Interventions: bed/chair alarm, gripper socks and pt to call before getting OOB  High Fall Risk: Yes    Length of Stay:  Expected LOS: 4d 9h  Actual LOS: 8      Karla Gomez RN

## 2022-01-03 NOTE — PROGRESS NOTES
Hospitalist Progress Note    NAME: Stef Carty   :  1975   MRN:  [id number]       Assessment / Plan:  Acute blood loss anemia secondary to large gastric ulcer status post blood transfusion  Gastric variceal bleed  History of ventricular tachyarrhythmia  Cardiomyopathy  Status post AICD  Hemorrhagic shock  Patient was transferred out of ICU  Status post banding  Coronary artery disease  Abdominal pain patient is on fentanyl IV requesting to change to every 3 hours. Acute hypoxemic respiratory failure status post intubation mechanical ventilation extubated 2021. Plan was for TIPS, but this was DC'd due to normal pressures. C/w PPI  C/w EPO  Trend Hgb      End-stage renal disease on hemodialysis status post renal transplant x2  Chronic systolic heart failure  EF 35%. History of HIT  Avoid heparin    RLE swelling  Left upper extremity very swollen plan vascular plans to  fistulogram with vascular showed stenosis and venous ansatamosis pta'D W/ 8X8. Occluded scv pta'D W/ 12X4. Good result w/ no residual stenosis. Plan is HD on Monday and patient may be discharged after that or Tuesday in the morning. Left lower extremity PAD vascular will follow as outpatient. RLE US     / Body mass index is 22.55 kg/m². Estimated discharge date: 2022  Barriers: RLE US    Code status: Full code  Prophylaxis: SCD  Recommended Disposition: Home     Subjective:     Chief Complaint / Reason for Physician Visit  Patient seen and examined at the bedside while undergoing hemodialysis. Is complaining of right lower extremity swelling today. He otherwise has no additional complaints. discussed with RN events overnight.   He otherwise has no additional complaints    Review of Systems:  Symptom Y/N Comments  Symptom Y/N Comments   Fever/Chills n   Chest Pain     Poor Appetite n   Edema     Cough n   Abdominal Pain     Sputum n   Joint Pain     SOB/ZAMUDIO n   Pruritis/Rash     Nausea/vomit n   Tolerating PT/OT     Diarrhea n   Tolerating Diet     Constipation n   Other       Could NOT obtain due to:      Objective:     VITALS:   Last 24hrs VS reviewed since prior progress note. Most recent are:  Patient Vitals for the past 24 hrs:   Temp Pulse Resp BP SpO2   01/03/22 1522 98.1 °F (36.7 °C) 91 16 (!) 154/101 99 %   01/03/22 0826 97.6 °F (36.4 °C) 80 16 116/77 99 %   01/02/22 2325 98.5 °F (36.9 °C) 77 18 137/76 100 %   01/02/22 1934 98.5 °F (36.9 °C) 86 18 122/77 100 %     No intake or output data in the 24 hours ending 01/03/22 1556     I had a face to face encounter and independently examined this patient on 1/3/2022, as outlined below:  PHYSICAL EXAM:  General: WD, WN. Alert, cooperative, no acute distress    EENT:  EOMI. Anicteric sclerae. MMM  Resp:  CTA bilaterally, no wheezing or rales. No accessory muscle use  CV:  Regular  rhythm,  No edema, RLE central line  GI:  Soft, Non distended, Non tender. +Bowel sounds  Neurologic:  Alert and oriented X 3, normal speech,   Psych:   Good insight. Not anxious nor agitated      Reviewed most current lab test results and cultures  YES  Reviewed most current radiology test results   YES  Review and summation of old records today    NO  Reviewed patient's current orders and MAR    YES  PMH/ reviewed - no change compared to H&P  ________________________________________________________________________  Care Plan discussed with:    Comments   Patient x    Family      RN     Care Manager     Consultant                        Multidiciplinary team rounds were held today with , nursing, pharmacist and clinical coordinator. Patient's plan of care was discussed; medications were reviewed and discharge planning was addressed.      ________________________________________________________________________  Total NON critical care TIME: 33    Total CRITICAL CARE TIME Spent:   Minutes non procedure based      Comments   >50% of visit spent in counseling and coordination of care     ________________________________________________________________________  Jessica Beck MD     Procedures: see electronic medical records for all procedures/Xrays and details which were not copied into this note but were reviewed prior to creation of Plan. LABS:  I reviewed today's most current labs and imaging studies. Pertinent labs include:  Recent Labs     12/31/21  1722   HGB 9.2*     No results for input(s): NA, K, CL, CO2, GLU, BUN, CREA, CA, MG, PHOS, ALB, TBIL, TBILI, ALT, INR, INREXT, INREXT in the last 72 hours.     No lab exists for component: SGOT    Signed: Jessica Beck MD

## 2022-01-03 NOTE — PROGRESS NOTES
Nephrology Progress Note  Blaise Leblanc     [facility]. [facility]  Phone - [phone]   Patient: Traci Villafana    YOB: 1975        Date- 1/3/2022   Admit Date: 12/26/2021  CC: Follow up for ESRD          IMPRESSION & PLAN:   · ESRD - home HD 5 days per week- Follows up with Dr Onur Wilde at [facility]  · Covid 19 infection  · Hypotension  · Acute blood loss anemia   · H/o esophageal bleed from esophagitis  · Left arm swelling- s/p angioplasty of left subclavian vein  · Gram positive sepsis from dental abcess  · s/p lead extraction at [facility]  · Anemia of ckd  · Hx of renal tx in past times 2.  · Hypertension  · CAD  · Failed RTX times 2  · H/o DVT, s/p ivc filter/ h/o HIT      PLAN-   HD TODAY   Continue vasopressor support   Continue epogen     Subjective: Interval History:   -  bp stable  No sob        Objective:   Vitals:    01/02/22 1508 01/02/22 1934 01/02/22 2325 01/03/22 0826   BP: 130/88 122/77 137/76 116/77   Pulse: 88 86 77 80   Resp: 19 18 18 16   Temp: 98.5 °F (36.9 °C) 98.5 °F (36.9 °C) 98.5 °F (36.9 °C) 97.6 °F (36.4 °C)   TempSrc:       SpO2: 98% 100% 100% 99%   Weight:       Height:          01/02 0701 - 01/03 0700  In: 220 [I.V.:220]  Out: -   Last 3 Recorded Weights in this Encounter    12/26/21 0023 12/27/21 0700 12/28/21 0400   Weight: 61.2 kg (135 lb) 65.8 kg (145 lb 1 oz) 65.3 kg (143 lb 15.4 oz)      Physical exam:   GEN: NAD  NECK- Supple  RESP: no distress  NEURO:non focal      Due to the COVID-19 pandemic, in order to reduce the spread and transmission of the virus, some basic elements of the physical exam have been deferred to reduce direct or close contact with the patient . Chart reviewed. Pertinent Notes reviewed. Data Review :  No results for input(s): NA, K, CL, CO2, BUN, CREA, GLU, CA, MG, PHOS, URICA in the last 72 hours.   Recent Labs     12/31/21  1722   HGB 9.2*     No results for input(s): FE, TIBC, PSAT, FERR in the last 72 hours.    Medication list  reviewed  Current Facility-Administered Medications   Medication Dose Route Frequency    sucralfate (CARAFATE) tablet 1 g  1 g Oral AC&HS    pantoprazole (PROTONIX) tablet 40 mg  40 mg Oral ACB&D    fentaNYL citrate (PF) injection 50 mcg  50 mcg IntraVENous Q4H PRN    alum-mag hydroxide-simeth (MYLANTA) oral suspension 30 mL  30 mL Oral Q4H PRN    balsam peru-castor oiL (VENELEX) ointment   Topical BID    oxyCODONE-acetaminophen (PERCOCET) 5-325 mg per tablet 1 Tablet  1 Tablet Oral Q4H PRN    sodium chloride (NS) flush 5-40 mL  5-40 mL IntraVENous Q8H    sodium chloride (NS) flush 5-40 mL  5-40 mL IntraVENous PRN    acetaminophen (TYLENOL) tablet 650 mg  650 mg Oral Q6H PRN    Or    acetaminophen (TYLENOL) suppository 650 mg  650 mg Rectal Q6H PRN    polyethylene glycol (MIRALAX) packet 17 g  17 g Oral DAILY PRN    ondansetron (ZOFRAN) injection 4 mg  4 mg IntraVENous Q6H PRN    alcohol 62% (NOZIN) nasal  1 Ampule  1 Ampule Topical Q12H    epoetin emilie-epbx (RETACRIT) injection 20,000 Units  20,000 Units SubCUTAneous Q MON, WED & FRI          Blanca Raymundo MD              [facility]  [facility] / [place] [address] [place] [address], [facility], [address]  Phone - [phone]               Fax - [phone]

## 2022-01-03 NOTE — PROGRESS NOTES
End of shift report given to the following on coming nurse, Khari from the outgoing nurse [facility]. Report included SBAR, MAR, KArdex and recent results.

## 2022-01-03 NOTE — PROGRESS NOTES
Problem: Falls - Risk of  Goal: *Absence of Falls  Description: Document Suri Litter Fall Risk and appropriate interventions in the flowsheet. Outcome: Progressing Towards Goal  Note: Fall Risk Interventions:  Mobility Interventions: Communicate number of staff needed for ambulation/transfer    Mentation Interventions: Bed/chair exit alarm    Medication Interventions: Bed/chair exit alarm    Elimination Interventions: Call light in reach    History of Falls Interventions:  Investigate reason for fall         Problem: Patient Education: Go to Patient Education Activity  Goal: Patient/Family Education  Outcome: Progressing Towards Goal

## 2022-01-03 NOTE — PROCEDURES
Hemodialysis / [phone]    Vitals Pre Post Assessment Pre Post   BP BP: 118/68 (01/03/22 1546) 149/90 LOC A&Ox4, cooperative, follows commands A&Ox4, cooperative, follows commands   HR Pulse (Heart Rate): 78 (01/03/22 1546) 94 Lungs CTA bilaterally  CTA bilaterally   Resp Resp Rate: 16 (01/03/22 1546) 16 Cardiac Regular, S1, S2 Regular S1, S2   Temp Temp: 98.1 °F (36.7 °C) (01/03/22 1546) 98.1 Skin LUE AVG LUE AVG   Weight Pre-Dialysis Weight:  (not working) (12/31/21 0525) n/a Edema 2-3+ 2-3+   Tele status Medical  Medical  Pain Pain Intensity 1: 7 (01/03/22 1446) 4-abdomen, primary RN aware      Orders   Duration: Start: 5250 End: 1916 Total:  3.5 hours   Dialyzer: Dialyzer/Set Up Inspection: Revaclear (01/03/22 1546)   K Bath: Dialysate K (mEq/L): 2 (01/03/22 1546)   Ca Bath: Dialysate CA (mEq/L): 3.0 (01/03/22 1546)   Na: Dialysate NA (mEq/L): 140 (01/03/22 1546)   Bicarb: Dialysate HCO3 (mEq/L): 40 (01/03/22 1546)   Target Fluid Removal: Goal/Amount of Fluid to Remove (mL): 2000 mL (01/03/22 1546)     Access   Type & Location: LUE AVG   Comments:   Skin CDI. No s/s of infection. Suture noted at upper portion of AVG, CDI.  + B/T. No issues with cannulation or hemostasis. Running well at . Labs   HBsAg (Antigen) / date: 12/1/21 Negative  .                                             HBsAb (Antibody) / date: 11/11/21 IMMUNE   Source:    Obtained/Reviewed  Critical Results Called HGB   Date Value Ref Range Status   01/03/2022 8.9 (L) 12.1 - 17.0 g/dL Final     Potassium   Date Value Ref Range Status   12/31/2021 5.1 3.5 - 5.1 mmol/L Final     Calcium   Date Value Ref Range Status   12/31/2021 7.2 (L) 8.5 - 10.1 MG/DL Final     BUN   Date Value Ref Range Status   12/31/2021 31 (H) 6 - 20 MG/DL Final     Creatinine   Date Value Ref Range Status   12/31/2021 5.58 (H) 0.70 - 1.30 MG/DL Final        Meds Given   Name Dose Route                    Adequacy / Fluid    Total Liters Process: 79.3   Net Fluid Removed: 2000 mL      Comments   Time Out Done:   (Time) @ 1757   Admitting Diagnosis: gastric variceal bleeding    Consent obtained/signed: Informed Consent Verified: Yes (01/03/22 1546)   Machine / Mya Lin # Machine Number: [id number] (01/03/22 1546)   Primary Nurse Rpt Pre: Renzo Campos RN   Primary Nurse Rpt Post: Renzo Campos RN   Pt Education: Ordered Dialysis Treatment    Care Plan: CKD   Pts outpatient clinic: Home Dialysis      Tx Summary   Comments:                  Dialysis RN arrived to patient room,  Pt A&Ox4, agreeable to treatment plan. Consent signed & on file. SBAR received from Primary RN. 1546: Pt cannulated with 47O needles per policy & without issue. Labs drawn per request/ order. VSS. Dialysis Tx initiated. 1630: Pt resting quietly, VSS, lines patent and intact. 1730: Pt resting quielty, VSS, lines patent and intact. 1830: Pt complaining of 7/10 aching abdominal pain, informed primary RN, pt medicated for pain per MAR. VSS. Lines patent and intact. 1916: Tx ended. VSS. All possible blood returned to patient. Hemostasis achieved without issue. Bed locked and in the lowest position, call bell and belongings in reach. SBAR given to Primary, RN. Patient is stable at time of my departure. All Dialysis related medications have been reviewed.

## 2022-01-03 NOTE — PROGRESS NOTES
End of Shift Note    Bedside shift change report given to Sundeep Law RN (oncoming nurse) by Brandon Winters RN (offgoing nurse). Report included the following information SBAR, Kardex, Intake/Output, MAR and Recent Results    Shift worked: [id number]   Shift summary and any significant changes:    Sacral wound noted, applied foam dressing. Patient complaining he can't sit on his bottom due to pain, educated on turning every 2 hours. Femoral Quad lumen with increased drainage, noted 2 open area near insertion site that are leaking of fluid. Patient noted odor however nurse did not. Site cleaned well with CHG.       Concerns for physician to address:    Zone phone for oncoming shift:

## 2022-01-03 NOTE — PROGRESS NOTES
Vascular    LUE edema improving  C/o RLE swelling  He most likely has a Rt femoral DVT 2/2 central line  Will get venous duplex  Would leave central line for now  If he has a new DVT, he will need a filter  If needed, I will place filter tomorrow  Most likely, he could still go home after filter

## 2022-01-03 NOTE — PROGRESS NOTES
Vascular Surgery Progress Note  Deepa Fagan ACNP-BC  1/3/2022       Subjective:     Tru Costa a 46 y. o.  with a hx of ASHD w/ hx of cardiac stenting, HTN, HLD, HFrEF, ESRF on home HD, multiple DVTs, HIT, anemia, small bowel necrosis, and GERD.  He is a well-known patient of the practice. He is routinely under the care of Dr. Clem Melendrez is s/p creation of a RUE bovine aVG (now w/o flow) & LUE [place] aVG 9/2020.  He presented to [facility] in April of this year and was diagnosed w/ bacteremia after an extensive evaluation including EVER, CT scan, and tag WBC scan were unremarkable he underwent exploration of his LUE AVG on 4/9/2021.  Surgical cultures were negative. Margi Gutierrez was transferred to [facility] for evaluation of his AICD & S-ICD.  A EVER there revealed a mobile echodensity 12 x 6 mm attached to the endocardial lead.  He underwent explantation of his generator and pacemaker leads on 4/22/2021 for MRSE epidermitis lead endocarditis. Margi Gutierrez continues to have a subcutaneous functioning S-ICD in place that was placed prior to his infection in 2019.  While admitted at [facility] he was noted to have \"scattered ill-defined groundglass opacities within the right upper and lower lobes along with some scattered ill-defined nodular opacities. He was readmitted to [facility] in November of 2021 with streptococcus intermedius bacteremia thought to be secondary to multiple dental caries. He was discharged on a prolonged course of IV Vancomycin w/ a stop date of 12/5/2021. He was readmitted to the hospital on 12/19 => 12/24/2021 with hemorraghic shock secondary to an upper GIB related to esophageal and gastric varices. He is s/p clipping. He received a total of 13 units of pRBCs, 3 units Plasma, 2 Cryoprecipitate, and 3 units Plts during that admission. His warfarin and ASA were discontinued. Plan was for repeat EGD prior to restarting anticoagulation. His hospital course was complicated by [id number] PNA.   He was discharge on 12/24 only to return to the hospital on 12/26 w/ hematemesis and hypotension. He underwent EGD on 12/26. He had multiple gastric ulcers that were bleeding that were injected and two additional clips were placed. He had persistent anemia and hematemesis and he underwent a TIPS (transjuglar intrahepatic portosystemic shunt) procedure on December 30, 2021. Following the procedure his hemoglobin and hematocrit stabilized. He was able to be weaned from vasopressor support. On January 2, 2022 he underwent a fistulogram with balloon angioplasty of his hemodialysis access. Post procedure his left arm swelling is slowly improving. He has a palpable thrill. The patient also has a non-healing wound of the left second and third toe with a hx of a left femoral bypass in 2012. Duplex of the bypass revealed a patent bypass with stenosis of the left lower extremity distal to the bypass. Plan is for outpatient arteriogram of the left lower extremity. Nursing Data:     Patient Vitals for the past 24 hrs:   BP Temp Pulse Resp SpO2   01/03/22 1522 (!) 154/101 98.1 °F (36.7 °C) 91 16 99 %   01/03/22 0826 116/77 97.6 °F (36.4 °C) 80 16 99 %   01/02/22 2325 137/76 98.5 °F (36.9 °C) 77 18 100 %   01/02/22 1934 122/77 98.5 °F (36.9 °C) 86 18 100 %       No intake or output data in the 24 hours ending 01/03/22 1613    Exam:     Physical Exam  Constitutional:       Interventions: He is intubated. Comments: Chronic appearing -American male. He is alert and asking and answering questions appropriately. Appears older than stated age. HENT:      Head: Normocephalic. Comments: Moon face      Nose: Nose normal.      Mouth/Throat:      Mouth: Mucous membranes are moist.   Eyes:      Pupils: Pupils are equal, round, and reactive to light. Cardiovascular:      Rate and Rhythm: Normal rate and regular rhythm. Pulses: Normal pulses. Comments: Left hand sensation and movement remained intact. Hand is warm. Weak but palpable radial pulse. Left AV graft has palpable thrill. 2+ edema of left upper extremity. Open wound of left second and third toes without palpable DP pulse. Graft pulse is intact. Pulmonary:      Effort: Pulmonary effort is normal. No accessory muscle usage or respiratory distress. Abdominal:      General: Abdomen is flat. There is no distension. Musculoskeletal:         General: Normal range of motion. Cervical back: Normal range of motion. Skin:     Coloration: Skin is pale. Neurological:      Mental Status: He is alert. Mental status is at baseline. Lab Review:     . No results found for this or any previous visit (from the past 24 hour(s)). Assessment/Plan:     Chronic left arm central venous stenosis s/p aVG               -has been to unwell for access revision               -s/p multiple fistulogram/BAPs last in 11/2021   -Fistulogram with balloon angioplasty 1/2  · Patient may proceed with access for dialysis. Follow-up as needed.     PAD with non-healing wound of the left foot  · With hx of LLE femoral bypass that is patent per duplex. ABIs are diminished on the left. Patient will need outpatient arteriogram once recovered from acute issue. We will arrange follow-up. Hemorrhagic shock  -Resolved  Anemia of acute blood loss with underlying anemia of chronic disease  Recurrent upper gastrointestinal hemorrhage  Portal hypertension  Esophageal and gastric varices  -Status post recent injection and clipping x4.  -on Octreotide and PPI  -s/p TIPS 12/30/2021  · Plan per gastroenterology/IR.     Acute hypoxic respiratory failure  Mechanical intubation 12/26 => 12/29  Pulmonary hypertension   Personal history of COVID-19 pneumonia     End-stage renal failure  -Home dialysis 5 times per week   Hyperkalemia      History of hypertension  Hyperlipidemia  Coronary artery disease  HFrEF-41%  History of ventricular tachycardia     Seizure disorder     Chronic pain syndrome with history of opioid use     VTE Prophylaxis:  History of multiple thrombotic events including left lower extremity, left brachial, and right IJ DVTs. -History of IVC filter placement that has since been removed. History of HIT  · SCDs contraindicated due to PAD. · Warfarin currently contraindicated due to life-threatening GI bleed     Disposition:  Home. Stable for discharge from a vascular standpoint. Vascular surgery signing off. We will arrange outpatient follow-up for arteriogram and call patient with appointment. Please reconsult as needed.

## 2022-01-03 NOTE — PROGRESS NOTES
Music Therapy Assessment  [address]    Hosea Pinedo [id number]     1975  42 y.o.  male    Patient Telephone Number: [phone] (home)   Sabianist Affiliation: Kathy Galicia   Language: English   Patient Active Problem List    Diagnosis Date Noted    S/p cadaver renal transplant 05/23/2012    GIB (gastrointestinal bleeding) 12/26/2021    Goals of care, counseling/discussion     DNR (do not resuscitate) discussion     Need for emotional support     Acute COVID-19 11/27/2021    Fever 11/05/2021    Leucocytosis 03/29/2021    Gram-positive bacteremia 03/29/2021    Abnormal EKG 09/10/2020    ESRD (end stage renal disease) ([address] 09/10/2020    Pneumonia 01/16/2019    Sepsis ([address] 11/17/2018    Hypoglycemia 09/16/2014    Congestive heart failure, unspecified 09/11/2014    ESRD (end stage renal disease) on dialysis ([address] 05/14/2013    Dialysis patient ([address] 05/14/2013    AICD (automatic cardioverter/defibrillator) present 05/23/2012    Arterial occlusion 02/19/2012    Peritonitis ([address] 12/06/2011    Malnutrition ([address] 12/06/2011    DVT (deep venous thrombosis) ([address] 12/06/2011    S/P IVC filter 12/06/2011    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[address] 11/25/2011    HTN (hypertension) 11/06/2011    Anemia 11/06/2011    S/P appendectomy 11/06/2011    High cholesterol     Other ill-defined conditions(799.89)     CAD (coronary artery disease)     Gastrointestinal disorder     Abdominal pain 11/02/2011    Nausea & vomiting 07/13/2011    Serum total bilirubin elevated 07/13/2011    Kidney transplant 07/12/2011    Coronary atherosclerosis of native coronary artery 07/12/2011        Date: 1/3/2022            Total Time (in minutes): 10          MRM 1 MEDICAL ONCOLOGY    Mental Status:   [x] Alert [  ] Maralyn Atkinson [  ]  Confused  [  ] Minimally responsive  [  ] Sleeping    Communication Status: [  ] Impaired Speech [  ] Nonverbal -N/A    Physical Status:   [x] Oxygen in use  [  ] Hard of Hearing [  ] Vision Impaired  [  ] Ambulatory  [  ] Ambulatory with assistance [  ] Non-ambulatory     Music Preferences, Background: Pt said he likes something from most every genre of music. Clinical Problem addressed: N/A: Please see Session Observations below. Goal(s) met in session: N/A: Please see Session Observations below. Physical/Pain management (Scale of 1-10):    Pre-session rating ___________    Post-session rating __________  [  ] Increased relaxation   [  ] Affected breathing patterns  [  ] Decreased muscle tension   [  ] Decreased agitation  [  ] Affected heart rate    [  ] Increased alertness     Emotional/Psychological:  [  ] Increased self-expression   [  ] Decreased aggressive behavior   [  ] Decreased feelings of stress  [  ] Discussed healthy coping skills     [  ] Improved mood    [  ] Decreased withdrawn behavior     Social:  [  ] Decreased feelings of isolation/loneliness [  ] Positive social interaction   [  ] Provided support and/or comfort for family/friends    Spiritual:  [  ] Spiritual support    [  ] Expressed peace  [  ] Expressed brown    [  ] Discussed beliefs    Techniques Utilized (Check all that apply): N/A: Please see Session Observations below. [  ] Procedural support MT [  ] Music for relaxation [  ] Patient preferred music  [  ] Nicole analysis  [  ] Song choice  [  ] Music for validation  [  ] Entrainment  [  ] Movement to music [  ] Guided visualization  [  ] Katya Peters  [  ] Patient instrument playing [  ] Amy Kenyon writing  [  ] Ella Vazquez along   [  ] Azalia Torres  [  ] Sensory stimulation  [  ] Active Listening  [  ] Music for spiritual support [  ] Making of CDs as gifts    Session Observations:  Referral from Radha Cisneros, Palliative . Patient (pt) was alert lying in bed. A nurse was at bedside, and so was dialysis machine. This music therapist (MT) asked the pt how he was feeling and offered music therapy for while the pt was having dialysis.  Pt declined for the time being, saying it wasn't a good time since they still needed to get his dialysis started. MT expressed understanding and concluded visit.     SIERRA Flanagan (Music Therapist-Board Certified)  Spiritual Care Department  Referral-based service

## 2022-01-03 NOTE — PROGRESS NOTES
GI Progress Note   Sammi Mace NP  NAME:Claude Redmond Horner :1975 EBE:[id number]   Prim GI: Thomas Yi MD  PCP: Genaro Gomez MD  Date/Time:  1/3/2022 10:15 AM   Assessment:   · Recurrent severe hematemesis  · Massive PRBC's transfusion since 23  · Anemia, post-hemorrhagic  · Has had now four EGD's most recently on  with bleeding GV and severe portal hypertensive gastropathy  · Severe pulmonary HTN  · ESRD on HD     Plan:   · Portal pressures not consistent with portal HTN, TIPS procedure not preformed. Bleeding from gastric varices are not easily fixed at this time. Hgb stable at this time. No overt GI bleeding   · If re-bleeding occurs would consider repeat EGD to r/o Dieulafoy lesion of stomach  · Continue PPI BID and Carafate QID   · Monitor for s/s of bleeding; transfuse as clinically indicated  · Serial H&H; goal for hgb >7.0  · Symptomatic care per primary team   *Plan of care discussed with Dr. Gareth Kruger      Subjective:   Patient resting in bed. With nursing at bedside. Patient concerned about swelling in right leg. States that he is having indigestion and a lot of \"gurgling\". Review of Systems:  Symptom Y/N Comments  Symptom Y/N Comments   Fever/Chills n   Chest Pain n    Cough n   Headaches n    Sputum n   Joint Pain n    SOB/ZAMUDIO n   Pruritis/Rash n    Tolerating Diet y   Other       Could NOT obtain due to:      Objective:   VITALS:   Last 24hrs VS reviewed since prior progress note. Most recent are:  Visit Vitals  /77   Pulse 80   Temp 97.6 °F (36.4 °C)   Resp 16   Ht 5' 7\" (1.702 m)   Wt 65.3 kg (143 lb 15.4 oz)   SpO2 99%   BMI 22.55 kg/m²       Intake/Output Summary (Last 24 hours) at 1/3/2022 0925  Last data filed at 2022 1011  Gross per 24 hour   Intake 220 ml   Output --   Net 220 ml     PHYSICAL EXAM:  Physical Exam  Vitals and nursing note reviewed. Constitutional:       General: He is not in acute distress.   HENT:      Head: Normocephalic and atraumatic. Nose: Nose normal.   Cardiovascular:      Rate and Rhythm: Normal rate and regular rhythm. Pulmonary:      Effort: Pulmonary effort is normal.   Abdominal:      General: Abdomen is flat. Bowel sounds are normal.      Palpations: Abdomen is soft. Musculoskeletal:      Cervical back: Normal range of motion. Skin:     General: Skin is warm and dry. Neurological:      General: No focal deficit present. Mental Status: He is alert. Psychiatric:         Mood and Affect: Mood normal.         Behavior: Behavior normal.           Lab and Radiology Data Reviewed: (see below)    Medications Reviewed: (see below)  PMH/SH reviewed - no change compared to H&P  ________________________________________________________________________  Care Plan discussed with:  Patient x   Family     RN x              Consultant:       Shantell Waggoner NP     Procedures: see electronic medical records for all procedures/Xrays and details which were not copied into this note but were reviewed prior to creation of Plan. LABS:  Recent Labs     12/31/21  1722   HGB 9.2*     No results for input(s): NA, K, CL, CO2, BUN, CREA, GLU, CA, MG, PHOS, URICA in the last 72 hours. No results for input(s): AP, TBIL, TP, ALB, GLOB, GGT, AML, LPSE in the last 72 hours. No lab exists for component: SGOT, GPT, AMYP, HLPSE  No results for input(s): INR, PTP, APTT, INREXT, INREXT in the last 72 hours. No results for input(s): FE, TIBC, PSAT, FERR in the last 72 hours. Lab Results   Component Value Date/Time    Folate 7.5 11/23/2011 11:26 AM     No results for input(s): PH, PCO2, PO2 in the last 72 hours. No results for input(s): CPK, CKMB in the last 72 hours.     No lab exists for component: TROPONINI  Lab Results   Component Value Date/Time    Color RED 11/16/2012 05:15 PM    Appearance OPAQUE 11/16/2012 05:15 PM    Specific gravity 1.020 11/16/2012 05:15 PM    Specific gravity 1.011 09/17/2012 01:35 AM    pH (UA) 8.0 11/16/2012 05:15 PM    Protein >300 (A) 11/16/2012 05:15 PM    Glucose 100 (A) 11/16/2012 05:15 PM    Ketone NEGATIVE  11/16/2012 05:15 PM    Bilirubin NEGATIVE  09/17/2012 01:35 AM    Urobilinogen 0.2 11/16/2012 05:15 PM    Nitrites NEGATIVE  11/16/2012 05:15 PM    Leukocyte Esterase NEGATIVE  11/16/2012 05:15 PM    Epithelial cells 5-10 11/16/2012 05:15 PM    Bacteria 3+ (A) 11/16/2012 05:15 PM    WBC >100 (H) 11/16/2012 05:15 PM    RBC >100 (H) 11/16/2012 05:15 PM       MEDICATIONS:  Current Facility-Administered Medications   Medication Dose Route Frequency    sucralfate (CARAFATE) tablet 1 g  1 g Oral ACB&D    pantoprazole (PROTONIX) tablet 40 mg  40 mg Oral ACB&D    fentaNYL citrate (PF) injection 50 mcg  50 mcg IntraVENous Q4H PRN    alum-mag hydroxide-simeth (MYLANTA) oral suspension 30 mL  30 mL Oral Q4H PRN    balsam peru-castor oiL (VENELEX) ointment   Topical BID    oxyCODONE-acetaminophen (PERCOCET) 5-325 mg per tablet 1 Tablet  1 Tablet Oral Q4H PRN    sodium chloride (NS) flush 5-40 mL  5-40 mL IntraVENous Q8H    sodium chloride (NS) flush 5-40 mL  5-40 mL IntraVENous PRN    acetaminophen (TYLENOL) tablet 650 mg  650 mg Oral Q6H PRN    Or    acetaminophen (TYLENOL) suppository 650 mg  650 mg Rectal Q6H PRN    polyethylene glycol (MIRALAX) packet 17 g  17 g Oral DAILY PRN    ondansetron (ZOFRAN) injection 4 mg  4 mg IntraVENous Q6H PRN    alcohol 62% (NOZIN) nasal  1 Ampule  1 Ampule Topical Q12H    epoetin emilie-epbx (RETACRIT) injection 20,000 Units  20,000 Units SubCUTAneous Q MON, WED & FRI

## 2022-01-03 NOTE — PROGRESS NOTES
Comprehensive Nutrition Assessment    Type and Reason for Visit: Reassess    Nutrition Recommendations/Plan:   Continue regular, low potassium, low phos diet  Ensure clear daily  Nepro BID  Please document % meals and supplements consumed in flowsheet I/O's under intake     Nutrition Assessment:      Chart reviewed remotely. Pt noted for new stage 2 PI to sacrum, recurrent hematemesis, post-hemorrhagic anemia, s/p four EGDs, gastric variceal bleed, cardiomyopathy, CAD, ESRD on HD. Diet advanced with renal restrictions, but no recent labs to review. Good appetite noted in flow sheets, but no recent PO intake documented. Will update supplement to Nepro for better nutritional option and continue monitoring. Encourage intake of meals and supplements. Patient Vitals for the past 168 hrs:   % Diet Eaten   12/29/21 2000 51 - 75%     Wt Readings from Last 5 Encounters:   12/28/21 65.3 kg (143 lb 15.4 oz)   12/21/21 61.3 kg (135 lb 1.6 oz)   11/17/21 66.1 kg (145 lb 12.8 oz)   04/07/21 66.8 kg (147 lb 4.3 oz)   03/27/21 67.4 kg (148 lb 9.4 oz)   ]    Estimated Daily Nutrient Needs:  Energy (kcal): 1951 (BMR 1501 x 1. 3AF); Weight Used for Energy Requirements: Current  Protein (g): 65-78 (1.0-1.2 g/kg bw); Weight Used for Protein Requirements: Current  Fluid (ml/day): 9667-1032 ml/day; Method Used for Fluid Requirements: 1 ml/kcal      Nutrition Related Findings:  No recent labs. Meds: mylanta, fentanyl, sucralfate. Edema: 3+RLE, 1+ generalized, BUE & LLE. BM 1/1 loose. Wounds:    Pressure injury,Stage II,Surgical incision       Current Nutrition Therapies:  ADULT ORAL NUTRITION SUPPLEMENT Breakfast, Lunch, Dinner; Clear Liquid  ADULT DIET Regular; Low Potassium (Less than 3000 mg/day); Low Phosphorus (Less than 1000 mg)    Anthropometric Measures:  · Height:  5' 7\" (170.2 cm)  · Current Body Wt:  65.3 kg (143 lb 15.4 oz)   · Ideal Body Wt:  148 lbs:  97.3 %   · BMI Category:  Normal weight (BMI 18.5-24. 9) Nutrition Diagnosis:   · Inadequate protein-energy intake related to altered GI function as evidenced by NPO or clear liquid status due to medical condition  Dx improving. Diet advanced.     Nutrition Interventions:   Food and/or Nutrient Delivery: Continue current diet,Modify oral nutrition supplement  Nutrition Education and Counseling: No recommendations at this time  Coordination of Nutrition Care: Continue to monitor while inpatient    Goals:  PO intake >50% meals and supplements next 2-4 days       Nutrition Monitoring and Evaluation:   Behavioral-Environmental Outcomes: None identified  Food/Nutrient Intake Outcomes: Food and nutrient intake,Supplement intake  Physical Signs/Symptoms Outcomes: Biochemical data,GI status,Weight,Skin    Discharge Planning:    Continue current diet,Continue oral nutrition supplement     Electronically signed by Lien Anders RD on 1/3/2022 at 1:49 PM    Contact: [phone]

## 2022-01-04 ENCOUNTER — APPOINTMENT (OUTPATIENT)
Dept: GENERAL RADIOLOGY | Age: 47
DRG: 270 | End: 2022-01-04
Attending: SURGERY
Payer: MEDICARE

## 2022-01-04 ENCOUNTER — ANESTHESIA EVENT (OUTPATIENT)
Dept: SURGERY | Age: 47
DRG: 270 | End: 2022-01-04
Payer: MEDICARE

## 2022-01-04 ENCOUNTER — ANESTHESIA (OUTPATIENT)
Dept: SURGERY | Age: 47
DRG: 270 | End: 2022-01-04
Payer: MEDICARE

## 2022-01-04 LAB
COVID-19 RAPID TEST, COVR: DETECTED
SOURCE, COVRS: ABNORMAL

## 2022-01-04 PROCEDURE — 74011250636 HC RX REV CODE- 250/636: Performed by: ANESTHESIOLOGY

## 2022-01-04 PROCEDURE — 77030008684 HC TU ET CUF COVD -B: Performed by: NURSE ANESTHETIST, CERTIFIED REGISTERED

## 2022-01-04 PROCEDURE — 74011250637 HC RX REV CODE- 250/637: Performed by: SURGERY

## 2022-01-04 PROCEDURE — 76210000017 HC OR PH I REC 1.5 TO 2 HR: Performed by: SURGERY

## 2022-01-04 PROCEDURE — 77030013079 HC BLNKT BAIR HGGR 3M -A: Performed by: NURSE ANESTHETIST, CERTIFIED REGISTERED

## 2022-01-04 PROCEDURE — 74011000250 HC RX REV CODE- 250: Performed by: SURGERY

## 2022-01-04 PROCEDURE — 74011250636 HC RX REV CODE- 250/636: Performed by: NURSE PRACTITIONER

## 2022-01-04 PROCEDURE — C1757 CATH, THROMBECTOMY/EMBOLECT: HCPCS | Performed by: SURGERY

## 2022-01-04 PROCEDURE — C1725 CATH, TRANSLUMIN NON-LASER: HCPCS | Performed by: SURGERY

## 2022-01-04 PROCEDURE — 74011250636 HC RX REV CODE- 250/636: Performed by: NURSE ANESTHETIST, CERTIFIED REGISTERED

## 2022-01-04 PROCEDURE — 74011000250 HC RX REV CODE- 250: Performed by: NURSE ANESTHETIST, CERTIFIED REGISTERED

## 2022-01-04 PROCEDURE — 77030004561 HC CATH ANGI DX COBRA ANGI -B: Performed by: SURGERY

## 2022-01-04 PROCEDURE — 87635 SARS-COV-2 COVID-19 AMP PRB: CPT

## 2022-01-04 PROCEDURE — 74011000636 HC RX REV CODE- 636: Performed by: SURGERY

## 2022-01-04 PROCEDURE — 76060000035 HC ANESTHESIA 2 TO 2.5 HR: Performed by: SURGERY

## 2022-01-04 PROCEDURE — 74011250637 HC RX REV CODE- 250/637: Performed by: NURSE PRACTITIONER

## 2022-01-04 PROCEDURE — 74011000250 HC RX REV CODE- 250: Performed by: NURSE PRACTITIONER

## 2022-01-04 PROCEDURE — C1880 VENA CAVA FILTER: HCPCS | Performed by: SURGERY

## 2022-01-04 PROCEDURE — 76010000131 HC OR TIME 2 TO 2.5 HR: Performed by: SURGERY

## 2022-01-04 PROCEDURE — C1769 GUIDE WIRE: HCPCS | Performed by: SURGERY

## 2022-01-04 PROCEDURE — 74011250636 HC RX REV CODE- 250/636: Performed by: SURGERY

## 2022-01-04 PROCEDURE — 74011000258 HC RX REV CODE- 258: Performed by: NURSE ANESTHETIST, CERTIFIED REGISTERED

## 2022-01-04 PROCEDURE — 2709999900 HC NON-CHARGEABLE SUPPLY: Performed by: SURGERY

## 2022-01-04 PROCEDURE — 74011250636 HC RX REV CODE- 250/636: Performed by: INTERNAL MEDICINE

## 2022-01-04 PROCEDURE — 77030026438 HC STYL ET INTUB CARD -A: Performed by: NURSE ANESTHETIST, CERTIFIED REGISTERED

## 2022-01-04 PROCEDURE — C1894 INTRO/SHEATH, NON-LASER: HCPCS | Performed by: SURGERY

## 2022-01-04 PROCEDURE — 73551 X-RAY EXAM OF FEMUR 1: CPT

## 2022-01-04 PROCEDURE — 65270000015 HC RM PRIVATE ONCOLOGY

## 2022-01-04 PROCEDURE — 76000 FLUOROSCOPY <1 HR PHYS/QHP: CPT

## 2022-01-04 DEVICE — FILTER VENA CAVA 7FR 79X65CM -- IGTCFS-65-1-FEM-CELECT-PT: Type: IMPLANTABLE DEVICE | Site: VENA CAVA | Status: FUNCTIONAL

## 2022-01-04 RX ORDER — OXYCODONE HYDROCHLORIDE 5 MG/1
10 TABLET ORAL
Status: DISCONTINUED | OUTPATIENT
Start: 2022-01-04 | End: 2022-01-18 | Stop reason: HOSPADM

## 2022-01-04 RX ORDER — SODIUM CHLORIDE, SODIUM LACTATE, POTASSIUM CHLORIDE, CALCIUM CHLORIDE 600; 310; 30; 20 MG/100ML; MG/100ML; MG/100ML; MG/100ML
25 INJECTION, SOLUTION INTRAVENOUS CONTINUOUS
Status: DISCONTINUED | OUTPATIENT
Start: 2022-01-04 | End: 2022-01-04 | Stop reason: HOSPADM

## 2022-01-04 RX ORDER — ACETAMINOPHEN 325 MG/1
325 TABLET ORAL
Status: DISCONTINUED | OUTPATIENT
Start: 2022-01-04 | End: 2022-01-15 | Stop reason: SDUPTHER

## 2022-01-04 RX ORDER — LIDOCAINE HYDROCHLORIDE 10 MG/ML
0.1 INJECTION, SOLUTION EPIDURAL; INFILTRATION; INTRACAUDAL; PERINEURAL AS NEEDED
Status: DISCONTINUED | OUTPATIENT
Start: 2022-01-04 | End: 2022-01-04 | Stop reason: HOSPADM

## 2022-01-04 RX ORDER — FENTANYL CITRATE 50 UG/ML
INJECTION, SOLUTION INTRAMUSCULAR; INTRAVENOUS AS NEEDED
Status: DISCONTINUED | OUTPATIENT
Start: 2022-01-04 | End: 2022-01-04 | Stop reason: HOSPADM

## 2022-01-04 RX ORDER — MIDAZOLAM HYDROCHLORIDE 1 MG/ML
1 INJECTION, SOLUTION INTRAMUSCULAR; INTRAVENOUS AS NEEDED
Status: DISCONTINUED | OUTPATIENT
Start: 2022-01-04 | End: 2022-01-04 | Stop reason: HOSPADM

## 2022-01-04 RX ORDER — FENTANYL CITRATE 50 UG/ML
50 INJECTION, SOLUTION INTRAMUSCULAR; INTRAVENOUS AS NEEDED
Status: DISCONTINUED | OUTPATIENT
Start: 2022-01-04 | End: 2022-01-04 | Stop reason: HOSPADM

## 2022-01-04 RX ORDER — SODIUM CHLORIDE 9 MG/ML
INJECTION, SOLUTION INTRAVENOUS
Status: DISCONTINUED | OUTPATIENT
Start: 2022-01-04 | End: 2022-01-04 | Stop reason: HOSPADM

## 2022-01-04 RX ORDER — SUCCINYLCHOLINE CHLORIDE 20 MG/ML
INJECTION INTRAMUSCULAR; INTRAVENOUS AS NEEDED
Status: DISCONTINUED | OUTPATIENT
Start: 2022-01-04 | End: 2022-01-04 | Stop reason: HOSPADM

## 2022-01-04 RX ORDER — FENTANYL CITRATE 50 UG/ML
25 INJECTION, SOLUTION INTRAMUSCULAR; INTRAVENOUS
Status: DISCONTINUED | OUTPATIENT
Start: 2022-01-04 | End: 2022-01-04 | Stop reason: HOSPADM

## 2022-01-04 RX ORDER — OXYCODONE AND ACETAMINOPHEN 5; 325 MG/1; MG/1
2 TABLET ORAL
Status: DISCONTINUED | OUTPATIENT
Start: 2022-01-04 | End: 2022-01-04 | Stop reason: SDUPTHER

## 2022-01-04 RX ORDER — FENTANYL CITRATE 50 UG/ML
25 INJECTION, SOLUTION INTRAMUSCULAR; INTRAVENOUS
Status: DISCONTINUED | OUTPATIENT
Start: 2022-01-04 | End: 2022-01-04

## 2022-01-04 RX ORDER — SODIUM CHLORIDE 9 MG/ML
25 INJECTION, SOLUTION INTRAVENOUS CONTINUOUS
Status: DISCONTINUED | OUTPATIENT
Start: 2022-01-05 | End: 2022-01-04 | Stop reason: HOSPADM

## 2022-01-04 RX ORDER — CEFAZOLIN SODIUM 1 G/3ML
2 INJECTION, POWDER, FOR SOLUTION INTRAMUSCULAR; INTRAVENOUS
Status: DISCONTINUED | OUTPATIENT
Start: 2022-01-04 | End: 2022-01-04

## 2022-01-04 RX ORDER — PHENYLEPHRINE HCL IN 0.9% NACL 0.4MG/10ML
SYRINGE (ML) INTRAVENOUS AS NEEDED
Status: DISCONTINUED | OUTPATIENT
Start: 2022-01-04 | End: 2022-01-04 | Stop reason: HOSPADM

## 2022-01-04 RX ORDER — LIDOCAINE HYDROCHLORIDE 20 MG/ML
INJECTION, SOLUTION EPIDURAL; INFILTRATION; INTRACAUDAL; PERINEURAL AS NEEDED
Status: DISCONTINUED | OUTPATIENT
Start: 2022-01-04 | End: 2022-01-04 | Stop reason: HOSPADM

## 2022-01-04 RX ORDER — FENTANYL CITRATE 50 UG/ML
25 INJECTION, SOLUTION INTRAMUSCULAR; INTRAVENOUS ONCE
Status: DISCONTINUED | OUTPATIENT
Start: 2022-01-04 | End: 2022-01-04

## 2022-01-04 RX ORDER — DIPHENHYDRAMINE HYDROCHLORIDE 50 MG/ML
INJECTION, SOLUTION INTRAMUSCULAR; INTRAVENOUS AS NEEDED
Status: DISCONTINUED | OUTPATIENT
Start: 2022-01-04 | End: 2022-01-04 | Stop reason: HOSPADM

## 2022-01-04 RX ORDER — ONDANSETRON 2 MG/ML
INJECTION INTRAMUSCULAR; INTRAVENOUS AS NEEDED
Status: DISCONTINUED | OUTPATIENT
Start: 2022-01-04 | End: 2022-01-04 | Stop reason: HOSPADM

## 2022-01-04 RX ORDER — HYDROMORPHONE HYDROCHLORIDE 1 MG/ML
0.5 INJECTION, SOLUTION INTRAMUSCULAR; INTRAVENOUS; SUBCUTANEOUS ONCE
Status: COMPLETED | OUTPATIENT
Start: 2022-01-04 | End: 2022-01-04

## 2022-01-04 RX ORDER — ONDANSETRON 2 MG/ML
4 INJECTION INTRAMUSCULAR; INTRAVENOUS AS NEEDED
Status: DISCONTINUED | OUTPATIENT
Start: 2022-01-04 | End: 2022-01-04 | Stop reason: HOSPADM

## 2022-01-04 RX ORDER — PROPOFOL 10 MG/ML
INJECTION, EMULSION INTRAVENOUS AS NEEDED
Status: DISCONTINUED | OUTPATIENT
Start: 2022-01-04 | End: 2022-01-04 | Stop reason: HOSPADM

## 2022-01-04 RX ORDER — HYDROMORPHONE HYDROCHLORIDE 1 MG/ML
.2-.5 INJECTION, SOLUTION INTRAMUSCULAR; INTRAVENOUS; SUBCUTANEOUS
Status: DISCONTINUED | OUTPATIENT
Start: 2022-01-04 | End: 2022-01-04 | Stop reason: HOSPADM

## 2022-01-04 RX ADMIN — PROPOFOL 30 MG: 10 INJECTION, EMULSION INTRAVENOUS at 11:42

## 2022-01-04 RX ADMIN — VASOPRESSIN 1 UNITS: 20 INJECTION INTRAVENOUS at 10:36

## 2022-01-04 RX ADMIN — FENTANYL CITRATE 25 MCG: 50 INJECTION INTRAMUSCULAR; INTRAVENOUS at 13:35

## 2022-01-04 RX ADMIN — SODIUM CHLORIDE: 0.9 INJECTION, SOLUTION INTRAVENOUS at 10:14

## 2022-01-04 RX ADMIN — SODIUM CHLORIDE 40 MCG/MIN: 900 INJECTION, SOLUTION INTRAVENOUS at 11:07

## 2022-01-04 RX ADMIN — Medication 80 MCG: at 10:36

## 2022-01-04 RX ADMIN — FENTANYL CITRATE 50 MCG: 0.05 INJECTION, SOLUTION INTRAMUSCULAR; INTRAVENOUS at 07:14

## 2022-01-04 RX ADMIN — VASOPRESSIN 1 UNITS: 20 INJECTION INTRAVENOUS at 12:01

## 2022-01-04 RX ADMIN — DIPHENHYDRAMINE HYDROCHLORIDE 25 MG: 50 INJECTION, SOLUTION INTRAMUSCULAR; INTRAVENOUS at 10:31

## 2022-01-04 RX ADMIN — WATER 2 G: 1 INJECTION INTRAMUSCULAR; INTRAVENOUS; SUBCUTANEOUS at 10:43

## 2022-01-04 RX ADMIN — SUCRALFATE 1 G: 1 TABLET ORAL at 16:20

## 2022-01-04 RX ADMIN — METHYLPREDNISOLONE SODIUM SUCCINATE 125 MG: 125 INJECTION, POWDER, FOR SOLUTION INTRAMUSCULAR; INTRAVENOUS at 10:33

## 2022-01-04 RX ADMIN — Medication 1 AMPULE: at 22:05

## 2022-01-04 RX ADMIN — SODIUM CHLORIDE, PRESERVATIVE FREE 10 ML: 5 INJECTION INTRAVENOUS at 21:21

## 2022-01-04 RX ADMIN — FENTANYL CITRATE 25 MCG: 50 INJECTION, SOLUTION INTRAMUSCULAR; INTRAVENOUS at 10:48

## 2022-01-04 RX ADMIN — FENTANYL CITRATE 25 MCG: 50 INJECTION, SOLUTION INTRAMUSCULAR; INTRAVENOUS at 10:54

## 2022-01-04 RX ADMIN — OXYCODONE AND ACETAMINOPHEN 1 TABLET: 5; 325 TABLET ORAL at 14:53

## 2022-01-04 RX ADMIN — OXYCODONE 10 MG: 5 TABLET ORAL at 19:01

## 2022-01-04 RX ADMIN — VASOPRESSIN 1 UNITS: 20 INJECTION INTRAVENOUS at 10:53

## 2022-01-04 RX ADMIN — FENTANYL CITRATE 50 MCG: 0.05 INJECTION, SOLUTION INTRAMUSCULAR; INTRAVENOUS at 03:23

## 2022-01-04 RX ADMIN — FENTANYL CITRATE 25 MCG: 50 INJECTION, SOLUTION INTRAMUSCULAR; INTRAVENOUS at 11:54

## 2022-01-04 RX ADMIN — VASOPRESSIN 1 UNITS: 20 INJECTION INTRAVENOUS at 12:04

## 2022-01-04 RX ADMIN — PROPOFOL 120 MG: 10 INJECTION, EMULSION INTRAVENOUS at 10:20

## 2022-01-04 RX ADMIN — PANTOPRAZOLE SODIUM 40 MG: 40 TABLET, DELAYED RELEASE ORAL at 16:20

## 2022-01-04 RX ADMIN — LIDOCAINE HYDROCHLORIDE 60 MG: 20 INJECTION, SOLUTION EPIDURAL; INFILTRATION; INTRACAUDAL; PERINEURAL at 10:20

## 2022-01-04 RX ADMIN — FENTANYL CITRATE 25 MCG: 50 INJECTION, SOLUTION INTRAMUSCULAR; INTRAVENOUS at 09:21

## 2022-01-04 RX ADMIN — SUCRALFATE 1 G: 1 TABLET ORAL at 21:21

## 2022-01-04 RX ADMIN — HYDROMORPHONE HYDROCHLORIDE 0.5 MG: 1 INJECTION, SOLUTION INTRAMUSCULAR; INTRAVENOUS; SUBCUTANEOUS at 22:05

## 2022-01-04 RX ADMIN — SODIUM CHLORIDE, PRESERVATIVE FREE 10 ML: 5 INJECTION INTRAVENOUS at 05:30

## 2022-01-04 RX ADMIN — PROPOFOL 30 MG: 10 INJECTION, EMULSION INTRAVENOUS at 10:22

## 2022-01-04 RX ADMIN — Medication 120 MCG: at 10:33

## 2022-01-04 RX ADMIN — Medication: at 18:04

## 2022-01-04 RX ADMIN — VASOPRESSIN 1 UNITS: 20 INJECTION INTRAVENOUS at 11:01

## 2022-01-04 RX ADMIN — SUCCINYLCHOLINE CHLORIDE 160 MG: 20 INJECTION, SOLUTION INTRAMUSCULAR; INTRAVENOUS at 10:20

## 2022-01-04 RX ADMIN — FENTANYL CITRATE 25 MCG: 50 INJECTION INTRAMUSCULAR; INTRAVENOUS at 13:31

## 2022-01-04 RX ADMIN — FENTANYL CITRATE 25 MCG: 50 INJECTION, SOLUTION INTRAMUSCULAR; INTRAVENOUS at 11:01

## 2022-01-04 RX ADMIN — SODIUM CHLORIDE 250 ML: 9 INJECTION, SOLUTION INTRAVENOUS at 17:52

## 2022-01-04 RX ADMIN — ONDANSETRON HYDROCHLORIDE 4 MG: 2 INJECTION, SOLUTION INTRAMUSCULAR; INTRAVENOUS at 12:07

## 2022-01-04 RX ADMIN — ACETAMINOPHEN 325 MG: 325 TABLET ORAL at 19:01

## 2022-01-04 RX ADMIN — FENTANYL CITRATE 25 MCG: 50 INJECTION, SOLUTION INTRAMUSCULAR; INTRAVENOUS at 09:35

## 2022-01-04 RX ADMIN — FENTANYL CITRATE 100 MCG: 50 INJECTION, SOLUTION INTRAMUSCULAR; INTRAVENOUS at 12:20

## 2022-01-04 RX ADMIN — FENTANYL CITRATE 25 MCG: 50 INJECTION INTRAMUSCULAR; INTRAVENOUS at 13:03

## 2022-01-04 RX ADMIN — FENTANYL CITRATE 25 MCG: 50 INJECTION INTRAMUSCULAR; INTRAVENOUS at 13:07

## 2022-01-04 RX ADMIN — VASOPRESSIN 1 UNITS: 20 INJECTION INTRAVENOUS at 11:16

## 2022-01-04 NOTE — BRIEF OP NOTE
Brief Postoperative Note    Patient: Genoveva Bridges  YOB: 1975  MRN: [id number]    Date of Procedure: 1/4/2022     Pre-Op Diagnosis: RIGHT ILIOFEMORAL DVT    Post-Op Diagnosis: Same as preoperative diagnosis. Procedure(s):  RIGHT LEG  VENOUS THROMBECTOMY AND INSERTION OF IVC FILTER    Surgeon(s):  Srinivasa Saleem MD    Surgical Assistant: Surg Asst-1: Chelita Cespedes    Anesthesia: General     Estimated Blood Loss (mL): less than 50     Complications: None    Specimens: * No specimens in log *     Implants:   Implant Name Type Inv. Item Serial No.  Lot No. LRB No. Used Action   FILTER VASC L49MM [id number] INTRO 7FR L65CM CATH L79CM VENA - SN/A  FILTER VASC L49MM [id number] INTRO 7FR Geoffrey.Class CATH Marlene.Limbo VENA N/A [employer]WD [id number] Right 1 Implanted       Drains:   [REMOVED] Orogastric Tube 12/06/21 (Removed)       [REMOVED] Orogastric Tube 12/08/21 (Removed)       Findings: Occlusive iliofemoral DVT. Successful thrombectomy. IVC filter placed. OK for HD today and discharge tomorrow. SCD on right leg unless ambulatory.     Electronically Signed by Dago Lucas MD on 1/4/2022 at 12:10 PM

## 2022-01-04 NOTE — PROGRESS NOTES
Blaise Leblanc         NAME:Claude Robinson [place]  [facility]:[id number]   :1975     Last 24 hours notes reviewed  Patient is in [address]  He had hd yesterday  Plan to do hd tomorrow. Kiah Rizzo [facility]  [facility]  [facility], [facility], [address]  Phone - [phone]         Fax - [phone] [facility] Office  [address] [address]  Phone - [phone]        Fax - [phone]     www. [email or web address]

## 2022-01-04 NOTE — ANESTHESIA PREPROCEDURE EVALUATION
Relevant Problems   RESPIRATORY SYSTEM   (+) Pneumonia      CARDIOVASCULAR   (+) CAD (coronary artery disease)   (+) Congestive heart failure, unspecified   (+) Coronary atherosclerosis of native coronary artery   (+) HTN (hypertension)      RENAL FAILURE   (+) ESRD (end stage renal disease) (HCC)   (+) ESRD (end stage renal disease) on dialysis (HCC)      HEMATOLOGY   (+) Anemia       Anesthetic History     PONV          Review of Systems / Medical History  Patient summary reviewed, nursing notes reviewed and pertinent labs reviewed    Pulmonary          Pneumonia         Neuro/Psych     seizures         Cardiovascular    Hypertension        Dysrhythmias   Past MI (2007), CAD and cardiac stents    Exercise tolerance: >4 METS     GI/Hepatic/Renal     GERD    Renal disease: ESRD and dialysis  Liver disease     Endo/Other  Within defined limits           Other Findings   Comments: Recent COVID +  HIT+  Clotted central venous system  H/o multiple DVTs  Difficult access           Physical Exam    Airway  Mallampati: III  TM Distance: 4 - 6 cm  Neck ROM: normal range of motion   Mouth opening: Normal     Cardiovascular    Rhythm: regular  Rate: normal         Dental    Dentition: Poor dentition     Pulmonary  Breath sounds clear to auscultation               Abdominal  GI exam deferred       Other Findings            Anesthetic Plan    ASA: 4  Anesthesia type: general    Monitoring Plan: BIS      Induction: Intravenous  Anesthetic plan and risks discussed with: Patient

## 2022-01-04 NOTE — PERIOP NOTES
9695 - PT ARRIVED INTO PRE-OP HOLDING - PT A&OX4. DAVILA WEAKLY, SPON AND TO COMMAND. RESP EVEN AND UNLABORED. BSB DIMINISHED IN BASES-OTHERWISE CLEAR. POX ON RA > 100%. PT C/O 7/10 RIGHT LEG PAIN - REPOSITIONED. PRE-OP TCHING DONE - PT VERBALIZES UNDERSTANDING. SR UP X4.  CB IN PLACE.     0921 - MEDICATED WITH FENTANYL AS ORDERED.    0940 - PT C/O 4-5/10 ON PAIN SCALE - RESTING IN NAPS.

## 2022-01-04 NOTE — PROGRESS NOTES
Problem: Falls - Risk of  Goal: *Absence of Falls  Description: Document Mary Brown Fall Risk and appropriate interventions in the flowsheet. Outcome: Progressing Towards Goal  Note: Fall Risk Interventions:  Mobility Interventions: Communicate number of staff needed for ambulation/transfer,Patient to call before getting OOB    Mentation Interventions: Bed/chair exit alarm,More frequent rounding,Reorient patient,Room close to nurse's station    Medication Interventions: Patient to call before getting OOB,Teach patient to arise slowly    Elimination Interventions: Call light in reach,Toileting schedule/hourly rounds    History of Falls Interventions: Door open when patient unattended,Room close to nurse's station         Problem: Patient Education: Go to Patient Education Activity  Goal: Patient/Family Education  Outcome: Progressing Towards Goal     Problem: Pressure Injury - Risk of  Goal: *Prevention of pressure injury  Description: Document Yong Scale and appropriate interventions in the flowsheet.   Outcome: Progressing Towards Goal  Note: Pressure Injury Interventions:  Sensory Interventions: Assess changes in LOC,Assess need for specialty bed,Keep linens dry and wrinkle-free,Maintain/enhance activity level,Minimize linen layers    Moisture Interventions: Absorbent underpads,Apply protective barrier, creams and emollients,Check for incontinence Q2 hours and as needed,Internal/External urinary devices    Activity Interventions: Assess need for specialty bed,Pressure redistribution bed/mattress(bed type)    Mobility Interventions: Assess need for specialty bed,HOB 30 degrees or less,PT/OT evaluation    Nutrition Interventions: Document food/fluid/supplement intake    Friction and Shear Interventions: Apply protective barrier, creams and emollients,Foam dressings/transparent film/skin sealants,Lift team/patient mobility team,Minimize layers                Problem: Patient Education: Go to Patient Education Activity  Goal: Patient/Family Education  Outcome: Progressing Towards Goal     Problem: Breathing Pattern - Ineffective  Goal: Ability to achieve and maintain a regular respiratory rate  Outcome: Progressing Towards Goal     Problem:  Body Temperature -  Risk of, Imbalanced  Goal: Ability to maintain a body temperature within defined limits  Outcome: Progressing Towards Goal  Goal: Will regain or maintain usual level of consciousness  Outcome: Progressing Towards Goal  Goal: Complications related to the disease process, condition or treatment will be avoided or minimized  Outcome: Progressing Towards Goal     Problem: Isolation Precautions - Risk of Spread of Infection  Goal: Prevent transmission of infectious organism to others  Outcome: Progressing Towards Goal     Problem: Nutrition Deficits  Goal: Optimize nutrtional status  Outcome: Progressing Towards Goal     Problem: Risk for Fluid Volume Deficit  Goal: Maintain normal heart rhythm  Outcome: Progressing Towards Goal  Goal: Maintain absence of muscle cramping  Outcome: Progressing Towards Goal  Goal: Maintain normal serum potassium, sodium, calcium, phosphorus, and pH  Outcome: Progressing Towards Goal     Problem: Loneliness or Risk for Loneliness  Goal: Demonstrate positive use of time alone when socialization is not possible  Outcome: Progressing Towards Goal     Problem: Fatigue  Goal: Verbalize increase energy and improved vitality  Outcome: Progressing Towards Goal     Problem: Patient Education: Go to Patient Education Activity  Goal: Patient/Family Education  Outcome: Progressing Towards Goal     Problem: Patient Education: Go to Patient Education Activity  Goal: Patient/Family Education  Outcome: Progressing Towards Goal     Problem: Airway Clearance - Ineffective  Goal: Achieve or maintain patent airway  Outcome: Resolved/Met     Problem: Gas Exchange - Impaired  Goal: Absence of hypoxia  Outcome: Resolved/Met  Goal: Promote optimal lung function  Outcome: Resolved/Met     Problem: Non-Violent Restraints  Goal: Removal from restraints as soon as assessed to be safe  Outcome: Resolved/Met  Goal: No harm/injury to patient while restraints in use  Outcome: Resolved/Met  Goal: Patient's dignity will be maintained  Outcome: Resolved/Met  Goal: Patient Interventions  Outcome: Resolved/Met     Problem: Ventilator Management  Goal: *Adequate oxygenation and ventilation  Outcome: Resolved/Met  Goal: *Patient maintains clear airway/free of aspiration  Outcome: Resolved/Met  Goal: *Absence of infection signs and symptoms  Outcome: Resolved/Met  Goal: *Normal spontaneous ventilation  Outcome: Resolved/Met

## 2022-01-04 NOTE — PROGRESS NOTES
GI Progress Note:    Patient in OR this AM during morning rounds  Plan for right leg thrombectomy and placement of IVC filter with vascular surgery today. Will attempt to see later today.     Plan:  Follow H&H  Monitor for s/s of bleeding  Continue PPI BID and Carafate QID  Will follow-up with patient tomorrow   Discharge planning per primary team

## 2022-01-04 NOTE — PERIOP NOTES
TRANSFER - IN REPORT:    Verbal report received from [facility] RN(name) on Naima Recio  being received from Oncology Room 1121(unit) for ordered procedure      Report consisted of patients Situation, Background, Assessment and   Recommendations(SBAR). Information from the following report(s) SBAR, Kardex, Intake/Output, MAR, Recent Results, Med Rec Status and Procedure Verification was reviewed with the receiving nurse. Opportunity for questions and clarification was provided. Assessment completed upon patients arrival to unit and care assumed.

## 2022-01-04 NOTE — ANESTHESIA POSTPROCEDURE EVALUATION
Procedure(s):  RIGHT LEG  VENOUS THROMBECTOMY AND INSERTION OF IVC FILTER. general    Anesthesia Post Evaluation        Patient location during evaluation: PACU  Note status: Adequate. Level of consciousness: responsive to verbal stimuli and sleepy but conscious  Pain management: satisfactory to patient  Airway patency: patent  Anesthetic complications: no  Cardiovascular status: acceptable  Respiratory status: acceptable  Hydration status: acceptable  Comments: +Post-Anesthesia Evaluation and Assessment    Patient: Hosea Pinedo MRN: [id number]  SSN: [id number]   YOB: 1975  Age: 42 y.o. Sex: male      Cardiovascular Function/Vital Signs    /70   Pulse 93   Temp 36.7 °C (98 °F)   Resp 16   Ht 5' 7\" (1.702 m)   Wt 65.3 kg (143 lb 15.4 oz)   SpO2 100%   BMI 22.55 kg/m²     Patient is status post Procedure(s):  RIGHT LEG  VENOUS THROMBECTOMY AND INSERTION OF IVC FILTER. Nausea/Vomiting: Controlled. Postoperative hydration reviewed and adequate. Pain:  Pain Scale 1: Visual (01/04/22 1345)  Pain Intensity 1: 4 (01/04/22 1335)   Managed. Neurological Status:   Neuro (WDL): Exceptions to WDL (01/04/22 1230)   At baseline. Mental Status and Level of Consciousness: Arousable. Pulmonary Status:   O2 Device: None (Room air) (01/04/22 1345)   Adequate oxygenation and airway patent. Complications related to anesthesia: None    Post-anesthesia assessment completed. No concerns. Signed By: Amberly Ambrocio MD    1/4/2022  Post anesthesia nausea and vomiting:  controlled      INITIAL Post-op Vital signs:   Vitals Value Taken Time   /70 01/04/22 1345   Temp 36.7 °C (98 °F) 01/04/22 1345   Pulse 94 01/04/22 1352   Resp 18 01/04/22 1352   SpO2 96 % 01/04/22 1352   Vitals shown include unvalidated device data.

## 2022-01-04 NOTE — PROGRESS NOTES
Received notification from bedside RN about patient with regards to: 10/10 generalized pain s/p HD, not yet time for PRN Fentanyl and requesting for medication for relief  VS: /76, , RR 17, O2 sat 99% on RA    Intervention given: Dilaudid 0.5 mg IV x 1 dose ordered

## 2022-01-04 NOTE — PERIOP NOTES
[facility] from Operating Room to PACU    Report received from [address] and A Wass CRNA regarding Cresencio Turpin. Surgeon(s):  Norah Castillo MD  And Procedure(s) (LRB):  RIGHT LEG  VENOUS THROMBECTOMY AND INSERTION OF IVC FILTER (Right)  confirmed   with allergies and dressings discussed. Anesthesia type, drugs, patient history, complications, estimated blood loss, vital signs, intake and output, and last pain medication, lines and temperature were reviewed. CRNA placed 20g PIV Right AC.     1240 Called Dr Mer Huerta to clarify SCD order. Per Dr Mer Huerta SCD on Right leg only as he wrote in his order and his note. 1420 TRANSFER - OUT REPORT:    [facility] Cresencio Turpin  being transferred to Onc(unit) for routine post - op       Report consisted of patients Situation, Background, Assessment and   Recommendations(SBAR). Information from the following report(s) SBAR, Kardex, OR Summary, Procedure Summary, Intake/Output, MAR and Cardiac Rhythm SR was reviewed with the receiving nurse. Opportunity for questions and clarification was provided. SCD on right leg only per DR Mer Huerta    Patient transported with:   Registered Nurse X 2  Room air  Patient chart  No belongings in PACU with patient.

## 2022-01-04 NOTE — PROGRESS NOTES
Vascular    Duplex shows extensive RLE DVT  He actually does not have an IVC filter  Will do venous thrombectomy and insert IVC filter this AM  Would plan on discharge tomorrow after HD

## 2022-01-04 NOTE — PROGRESS NOTES
End of Shift Note    Bedside shift change report given to Adry Toussaint (oncoming nurse) by Arnaldo Muñoz (offgoing nurse). Report included the following information SBAR, Kardex, Intake/Output, MAR and Recent Results    Shift worked:  7p-7a     Shift summary and any significant changes:     HD completed at beginning of shift. Ultrasound of right leg completed afterwards. See previous note regard results of Ultrasound. Was unable to obtain lab work this am.  Fentanyl given x3. Concerns for physician to address:  Unable to get labs. Results of Ultrasound. Zone phone for oncoming shift:   [phone]       Activity:  Activity Level: Bed Rest  Number times ambulated in hallways past shift: 0  Number of times OOB to chair past shift: 0    Cardiac:   Cardiac Monitoring: No      Cardiac Rhythm: Sinus Rhythm    Access:   Current line(s): central line     Genitourinary:   Urinary status: anuric    Respiratory:   O2 Device: None (Room air)  Chronic home O2 use?: NO  Incentive spirometer at bedside: NO     GI:  Last Bowel Movement Date: 01/02/22  Current diet:  ADULT ORAL NUTRITION SUPPLEMENT Lunch; Clear Liquid  ADULT ORAL NUTRITION SUPPLEMENT Breakfast, Dinner; Renal Supplement  DIET NPO  Passing flatus: YES  Tolerating current diet: YES       Pain Management:   Patient states pain is manageable on current regimen: YES    Skin:  Yong Score: 15  Interventions: increase time out of bed and PT/OT consult    Patient Safety:  Fall Score:  Total Score: 5  Interventions: gripper socks, pt to call before getting OOB and stay with me (per policy)  High Fall Risk: Yes    Length of Stay:  Expected LOS: 4d 9h  Actual LOS: 9      Arnaldo Muñoz

## 2022-01-04 NOTE — PROGRESS NOTES
2200: Informed by Ultrasound tech results of US. Complete occlusive thrombus in the following veins: Femoral, Tibial, Popliteal.  Paged [facility] to inform of the following. 2219: Received return call from Dr. Velia Sanchez, informed of results. Pt being followed by Dr. Kimberlee Weems, plan for filter in the morning. No new orders received at this time. Informed pt of NPO status. 0330: Writing RN attempted to obtain morning lab work x2. Pt with poor venous access. Pt has old Right upper arm fistula, Left upper arm fistula that is currently used for HD, and a Femoral CVC that does not have blood return. Will let day team know about failed attempted to get am lab work. May need PICC team assistance to obtain lab work.

## 2022-01-05 LAB
ANION GAP SERPL CALC-SCNC: 9 MMOL/L (ref 5–15)
BUN SERPL-MCNC: 41 MG/DL (ref 6–20)
BUN/CREAT SERPL: 7 (ref 12–20)
CALCIUM SERPL-MCNC: 7.8 MG/DL (ref 8.5–10.1)
CHLORIDE SERPL-SCNC: 98 MMOL/L (ref 97–108)
CO2 SERPL-SCNC: 30 MMOL/L (ref 21–32)
CREAT SERPL-MCNC: 6.01 MG/DL (ref 0.7–1.3)
ERYTHROCYTE [DISTWIDTH] IN BLOOD BY AUTOMATED COUNT: 16.5 % (ref 11.5–14.5)
GLUCOSE SERPL-MCNC: 123 MG/DL (ref 65–100)
HCT VFR BLD AUTO: 25.1 % (ref 36.6–50.3)
HGB BLD-MCNC: 8.1 G/DL (ref 12.1–17)
MCH RBC QN AUTO: 30.3 PG (ref 26–34)
MCHC RBC AUTO-ENTMCNC: 32.3 G/DL (ref 30–36.5)
MCV RBC AUTO: 94 FL (ref 80–99)
NRBC # BLD: 0 K/UL (ref 0–0.01)
NRBC BLD-RTO: 0 PER 100 WBC
PHOSPHATE SERPL-MCNC: 3.2 MG/DL (ref 2.6–4.7)
PLATELET # BLD AUTO: 130 K/UL (ref 150–400)
PMV BLD AUTO: 12.4 FL (ref 8.9–12.9)
POTASSIUM SERPL-SCNC: 3.6 MMOL/L (ref 3.5–5.1)
RBC # BLD AUTO: 2.67 M/UL (ref 4.1–5.7)
SODIUM SERPL-SCNC: 137 MMOL/L (ref 136–145)
WBC # BLD AUTO: 14.1 K/UL (ref 4.1–11.1)

## 2022-01-05 PROCEDURE — 65270000015 HC RM PRIVATE ONCOLOGY

## 2022-01-05 PROCEDURE — 74011250636 HC RX REV CODE- 250/636: Performed by: NURSE PRACTITIONER

## 2022-01-05 PROCEDURE — 80048 BASIC METABOLIC PNL TOTAL CA: CPT

## 2022-01-05 PROCEDURE — 74011250636 HC RX REV CODE- 250/636: Performed by: SURGERY

## 2022-01-05 PROCEDURE — 85027 COMPLETE CBC AUTOMATED: CPT

## 2022-01-05 PROCEDURE — 74011250637 HC RX REV CODE- 250/637: Performed by: SURGERY

## 2022-01-05 PROCEDURE — 84100 ASSAY OF PHOSPHORUS: CPT

## 2022-01-05 PROCEDURE — 74011250637 HC RX REV CODE- 250/637: Performed by: NURSE PRACTITIONER

## 2022-01-05 PROCEDURE — 36415 COLL VENOUS BLD VENIPUNCTURE: CPT

## 2022-01-05 PROCEDURE — 74011000250 HC RX REV CODE- 250: Performed by: SURGERY

## 2022-01-05 PROCEDURE — 90935 HEMODIALYSIS ONE EVALUATION: CPT

## 2022-01-05 RX ORDER — FENTANYL CITRATE 50 UG/ML
25 INJECTION, SOLUTION INTRAMUSCULAR; INTRAVENOUS ONCE
Status: COMPLETED | OUTPATIENT
Start: 2022-01-05 | End: 2022-01-05

## 2022-01-05 RX ORDER — OXYCODONE HYDROCHLORIDE 10 MG/1
10 TABLET ORAL
Qty: 18 TABLET | Refills: 0 | Status: SHIPPED | OUTPATIENT
Start: 2022-01-05 | End: 2022-01-08

## 2022-01-05 RX ORDER — POLYETHYLENE GLYCOL 3350 17 G/17G
17 POWDER, FOR SOLUTION ORAL DAILY
Qty: 30 PACKET | Refills: 0 | Status: SHIPPED | OUTPATIENT
Start: 2022-01-05

## 2022-01-05 RX ORDER — PANTOPRAZOLE SODIUM 40 MG/1
40 TABLET, DELAYED RELEASE ORAL
Qty: 60 TABLET | Refills: 2 | Status: SHIPPED | OUTPATIENT
Start: 2022-01-05

## 2022-01-05 RX ORDER — SUCRALFATE 1 G/1
1 TABLET ORAL
Qty: 120 TABLET | Refills: 2 | Status: SHIPPED | OUTPATIENT
Start: 2022-01-05

## 2022-01-05 RX ADMIN — EPOETIN ALFA-EPBX 20000 UNITS: 20000 INJECTION, SOLUTION INTRAVENOUS; SUBCUTANEOUS at 22:26

## 2022-01-05 RX ADMIN — OXYCODONE 10 MG: 5 TABLET ORAL at 13:24

## 2022-01-05 RX ADMIN — SUCRALFATE 1 G: 1 TABLET ORAL at 22:26

## 2022-01-05 RX ADMIN — ACETAMINOPHEN 325 MG: 325 TABLET ORAL at 08:32

## 2022-01-05 RX ADMIN — ACETAMINOPHEN 650 MG: 325 TABLET ORAL at 13:24

## 2022-01-05 RX ADMIN — PANTOPRAZOLE SODIUM 40 MG: 40 TABLET, DELAYED RELEASE ORAL at 08:32

## 2022-01-05 RX ADMIN — OXYCODONE 10 MG: 5 TABLET ORAL at 00:03

## 2022-01-05 RX ADMIN — PANTOPRAZOLE SODIUM 40 MG: 40 TABLET, DELAYED RELEASE ORAL at 17:23

## 2022-01-05 RX ADMIN — Medication 1 AMPULE: at 09:41

## 2022-01-05 RX ADMIN — SUCRALFATE 1 G: 1 TABLET ORAL at 08:32

## 2022-01-05 RX ADMIN — OXYCODONE 10 MG: 5 TABLET ORAL at 17:23

## 2022-01-05 RX ADMIN — FENTANYL CITRATE 25 MCG: 50 INJECTION INTRAMUSCULAR; INTRAVENOUS at 06:02

## 2022-01-05 RX ADMIN — Medication: at 09:42

## 2022-01-05 RX ADMIN — OXYCODONE 10 MG: 5 TABLET ORAL at 08:33

## 2022-01-05 RX ADMIN — ACETAMINOPHEN 325 MG: 325 TABLET ORAL at 00:03

## 2022-01-05 RX ADMIN — ACETAMINOPHEN 325 MG: 325 TABLET ORAL at 17:23

## 2022-01-05 RX ADMIN — OXYCODONE 10 MG: 5 TABLET ORAL at 04:14

## 2022-01-05 RX ADMIN — OXYCODONE 10 MG: 5 TABLET ORAL at 22:26

## 2022-01-05 RX ADMIN — SODIUM CHLORIDE, PRESERVATIVE FREE 10 ML: 5 INJECTION INTRAVENOUS at 06:03

## 2022-01-05 RX ADMIN — ACETAMINOPHEN 325 MG: 325 TABLET ORAL at 04:14

## 2022-01-05 RX ADMIN — Medication: at 18:00

## 2022-01-05 RX ADMIN — SUCRALFATE 1 G: 1 TABLET ORAL at 17:23

## 2022-01-05 NOTE — PROGRESS NOTES
Problem: Pressure Injury - Risk of  Goal: *Prevention of pressure injury  Description: Document Yong Scale and appropriate interventions in the flowsheet.   Outcome: Progressing Towards Goal  Note: Pressure Injury Interventions:  Sensory Interventions: Assess changes in LOC,Avoid rigorous massage over bony prominences,Monitor skin under medical devices,Pad between skin to skin    Moisture Interventions: Absorbent underpads,Check for incontinence Q2 hours and as needed    Activity Interventions: Assess need for specialty bed,Increase time out of bed    Mobility Interventions: HOB 30 degrees or less,PT/OT evaluation    Nutrition Interventions: Document food/fluid/supplement intake,Offer support with meals,snacks and hydration    Friction and Shear Interventions: Lift team/patient mobility team,Apply protective barrier, creams and emollients,Minimize layers

## 2022-01-05 NOTE — PROGRESS NOTES
[facility] follow-up PCP transitional care appointment has been scheduled with Dr. Tiffany Lindsey on 1/7/2022 at 2:00pm.  Pending patient discharge. Cesar Angeles Care Management Assistant       Attempted to schedule hospital follow up PCP appointment. Awaiting callback from PCP office. Pending patient discharge.  Isrrael Child

## 2022-01-05 NOTE — PROGRESS NOTES
Transition of Care Plan:    RUR: 27%   Disposition: Home with family, Outpatient HD, [facility]  Follow up appointments: Follow up with PCP and/or Specialist   DME needed: N/A  Transportation at Discharge: Family to assist   [place] or means to access home: Family will provide        IM Medicare Letter: 2nd IM Medicare Letter to be given   Is patient a BCPI-A Bundle:   N/A       If yes, was Bundle Letter given?:    Is patient a [place] and connected with the [place]? N/A  If yes, was [employer] transfer form completed and [facility] notified? Caregiver Contact: Paola Leslie (sister) [phone] or Vik Sandhu (family member) [phone]  Discharge Caregiver contacted prior to discharge? Family to be contacted      UPDATE: 4:11AM    CM notified by clinical staff that pt had concerns with d/c on today. CM completed room visit with pt, regarding d/c needs and plans. Pt reported that he resides with his sister and he normally is able to transport himself to and form dialysis. However, pt reported that he feels extremely weak, after his procedure. Pt requested if he can transition to placement for therapy. CM informed pt that sh will consult with physician regarding pt's concerns. CM consulted with physician to inform him of pt's concerns. Physician will enter consult for therapy to eval and treat for pt's baseline. Therapist will eval on 1/6/22. CM will inform facility that pt has past history of covid 19, but asymptomatic, and that he is an current dialysis pt. CM will follow. DARREL Pope, [address]       UPDATE: 3:19PM     CM spoke with pt's Outpatient [id number], via telephone to inform clinic that pt will be returning back on 1/6/22. CM informed that pt did not lost his chair time, and that clinical documents can be sent to clinic for charts. LINDA sent fax to: [phone].       DARREL Pope, [address]      INITIAL NOTE: LINDA: Epifanio Merlos is currently working with pt in the Onc Unit. CM informed that pt will d/c today after HD. Pt will return home with family support and HHC, provided by [facility]. Pt's family will assist will assist with transport on today.     LINDA completed the need for the pt at this time    DARREL Wilkerson, [address] [place] [address]

## 2022-01-05 NOTE — PROGRESS NOTES
Received notification from bedside RN about patient with regards to: 9/10 pain from procedure site, not yet due for PRN pain medication and requesting medication for relief  VS: /68, , RR 18, O2 sat 97% on RA    Intervention given: Dilaudid 0.5 mg IV x 1 dose ordered    0540: Requesting additional pain medication for 9/10 leg pain, not getting adequate relief with PRN Roxicodone  VS: /65, HR 94, RR 18, O2 sat 95% on RA    - Fentanyl 25 mcg IV x 1 dose ordered

## 2022-01-05 NOTE — DISCHARGE SUMMARY
Hospitalist Discharge Summary     Patient ID:  Spike Vidal  [id number]  71 y.o.  1975 12/26/2021    PCP on record: Yoav Walden MD    Admit date: 12/26/2021  Discharge date and time: 1/7/22    DISCHARGE DIAGNOSIS:  Acute GI bleed with massive hematemesis appeared recurrent and severe. Acute hemorrhagic shock  Status post intensive care unit pressors. Status post about 13 units of PRBC 3 units of plasma 2 units of cryoprecipitate. Status post pressors octreotide. Status post for EGD. Most recent December 26. Right iliofemoral extensive DVT  Status post thrombectomy and IVC filter placement. End-stage renal disease on hemodialysis. Severe peripheral vascular disease. CONSULTATIONS:  IP CONSULT TO GASTROENTEROLOGY  IP CONSULT TO INTENSIVIST  IP CONSULT TO NEPHROLOGY  IP CONSULT TO INTERVENTIONAL RADIOLOGY  IP CONSULT TO PALLIATIVE CARE - PROVIDER  IP CONSULT TO VASCULAR SURGERY    Excerpted HPI from H&P of Radha Campos MD:  Spike Vidal is a 42 y.o. male who has a PMH of ESRD on HD via fistula, recent COVID-23 infection, recent UGIB secondary to gastric varices and esophageal ulcerations c/b hemorrhagic shock, recent S intermedius bacteremia, recurrent DVT, IVC filter, HFrEF 35% s/p AICD, CAD, and severe pulmonary HTN who was recently discharged from [address] [place] [address] on 12/24 following a prolonged hospital course for GIB secondary to GVs and esophageal ulcerations and also COVID-19 pneumonia. He had been taken off isolation prior to discharge    ______________________________________________________________________  DISCHARGE SUMMARY/HOSPITAL COURSE:  for full details see H&P, daily progress notes, labs, consult notes.    Patient is a 60-TOQB-AFO extensive complicated stay in the hospital patient with multiple transfusions hemorrhagic shock extensive right lower extremity DVT status post thrombectomy and IVC filter placement patient with end-stage renal disease as well patient appeared might of had variceal bleed secondary to gastric varices. Patient was in the intensive care unit on pressors on octreotide. Subsequently was transferred out to medical floor we will continue to care continue PPI. Patient a day earlier had thrombectomy and IVC filter placement. H&H have been stable. We will continue with current regimen. Patient change his mind he want to go to SNF [facility] obviously not a [facility] Case management working on getting patient to SNF. Likely today. Patient was hallucinating yesterday but EMR repeatable factor patient is scheduled for today to go to SNF. EMTALA completed. _______________________________________________________________________  Patient seen and examined by me on discharge day. Pertinent Findings:  Gen:    Not in distress  Chest: Clear lungs  CVS:   Regular rhythm. No edema  Abd:  Soft, not distended, not tender  Neuro:  Alert,   _______________________________________________________________________  DISCHARGE MEDICATIONS:   Current Discharge Medication List      START taking these medications    Details   sucralfate (CARAFATE) 1 gram tablet Take 1 Tablet by mouth Before breakfast, lunch, dinner and at bedtime. Qty: 120 Tablet, Refills: 2  Start date: 1/5/2022      polyethylene glycol (MIRALAX) 17 gram packet Take 1 Packet by mouth daily. Qty: 30 Packet, Refills: 0  Start date: 1/5/2022      oxyCODONE IR (ROXICODONE) 10 mg tab immediate release tablet Take 1 Tablet by mouth every four (4) hours as needed for Pain for up to 3 days. Max Daily Amount: 60 mg.  Qty: 18 Tablet, Refills: 0  Start date: 1/5/2022, End date: 1/8/2022    Associated Diagnoses: Arterial occlusion         CONTINUE these medications which have CHANGED    Details   pantoprazole (PROTONIX) 40 mg tablet Take 1 Tablet by mouth Before breakfast and dinner.   Qty: 60 Tablet, Refills: 2  Start date: 1/5/2022         CONTINUE these medications which have NOT CHANGED    Details   propranoloL (INDERAL) 10 mg tablet       Vitamin D2 1,250 mcg (50,000 unit) capsule TAKE 1 CAPSULE BY MOUTH THREE  DAYS (TIMES) A WEEK      metoclopramide HCl (REGLAN) 5 mg tablet Take 1 Tablet by mouth two (2) times a day for 30 days. Qty: 60 Tablet, Refills: 0      ondansetron (Zofran ODT) 4 mg disintegrating tablet 1 Tab by SubLINGual route every eight (8) hours as needed for Nausea or Vomiting. Qty: 20 Tab, Refills: 0      calcium acetate,phosphat bind, (PHOSLO) 667 mg cap Take 2 Caps by mouth three (3) times daily (with meals). Indications: low amount of calcium in the blood, renal osteodystrophy with hyperphosphatemia  Qty: 120 Cap, Refills: 0      atorvastatin (Lipitor) 20 mg tablet Take 20 mg by mouth daily. acetaminophen (TYLENOL) 500 mg tablet Take 1 Tab by mouth every four (4) hours as needed for Pain. Over the counter  Qty: 30 Tab, Refills: 0      calcitRIOL (ROCALTROL) 0.5 mcg capsule Take 0.5 mcg by mouth daily. STOP taking these medications       oxyCODONE-acetaminophen (PERCOCET) 5-325 mg per tablet Comments:   Reason for Stopping:                 Patient Follow Up Instructions: Activity: Activity as tolerated  Diet: Cardiac Diet  Wound Care    Follow-up with  Pcp, nephrology, vascular   Follow-up tests/labs    Follow-up Information     Follow up With Specialties Details Why Contact Info    Kody Mora MD Vascular Surgery On 1/17/2022 @ 6:45 am. For your left leg arteriogram.  Nothing to eat or drink after midnight on the night prior to the procedure. DO NOT TAKE your warfarin for 5 days prior to the procedure if restarted.   [address]  [phone]      Roma Walton MD Gastroenterology Schedule an appointment as soon as possible for a visit  [address] [place] [address]      Chaitanya Bansal MD Family Medicine   [address] [place] [id number]  [phone]      [facility]   THIS IS [facility] AGENCY.   IF YOU DO NOT HEAR FROM THEM WITHIN 24-48HRS, PLEASE CONTACT THEM DIRECTLY [phone]        ________________________________________________________________    Risk of deterioration: high     Condition at Discharge:  Stable  __________________________________________________________________    Disposition  home    ____________________________________________________________________    Code Status: full  ___________________________________________________________________      Total time in minutes spent coordinating this discharge (includes going over instructions, follow-up, prescriptions, and preparing report for sign off to her PCP) : 38 minutes    Signed:  Barbara Lujan MD

## 2022-01-05 NOTE — PROGRESS NOTES
Hospitalist Progress Note    NAME: Miriam Mccord   :  1975   MRN:  [id number]       Assessment / Plan:  Acute blood loss anemia secondary to large gastric ulcer status post transfusion. Gastric variceal bleed  History of ventricular tachyarrhythmia  Cardiomyopathy  Hemorrhagic shock  Status post gastric banding  Coronary artery disease  We will continue current regimen plan is possible discharge.       / Body mass index is 22.55 kg/m². Estimated discharge date: 2022  Barriers:    Code status: Full code  Prophylaxis: Heparin  Recommended Disposition: Home     Subjective:     Chief Complaint / Reason for Physician Visit   Discussed with RN events overnight. Patient seen and examined at bedside. No new complaints. Review of Systems:  Symptom Y/N Comments  Symptom Y/N Comments   Fever/Chills    Chest Pain     Poor Appetite    Edema     Cough    Abdominal Pain     Sputum    Joint Pain     SOB/ZAMUDIO    Pruritis/Rash     Nausea/vomit    Tolerating PT/OT     Diarrhea    Tolerating Diet     Constipation    Other       Could NOT obtain due to:      Objective:     VITALS:   Last 24hrs VS reviewed since prior progress note.  Most recent are:  Patient Vitals for the past 24 hrs:   Temp Pulse Resp BP SpO2   22 0347 98.6 °F (37 °C) 94 18 117/65 95 %   22 0010 98.6 °F (37 °C) (!) 105 18 115/62 95 %   22 2027 99 °F (37.2 °C) 100 18 110/68 97 %   22 1743 99.1 °F (37.3 °C) (!) 113 20 (!) 115/57 94 %   22 1718 100 °F (37.8 °C) (!) 112 19 108/69 94 %   22 1617 99.4 °F (37.4 °C) (!) 108 18 115/69 96 %   22 1519 100.1 °F (37.8 °C) (!) 109 19 116/64 94 %   22 1439 98.9 °F (37.2 °C) 91 20 130/75 96 %   22 1415 -- 91 19 (!) 115/58 98 %   22 1400 -- 94 20 118/71 92 %   22 1345 98 °F (36.7 °C) 93 16 120/70 100 %   22 1330 -- 91 18 122/63 100 %   22 1315 -- 86 14 132/62 100 %   22 1300 -- 85 17 129/65 100 %   22 1245 -- 83 20 129/63 94 %   01/04/22 1240 -- 82 16 135/64 100 %   01/04/22 1235 -- 81 14 139/70 100 %   01/04/22 1232 98 °F (36.7 °C) 81 14 (!) 151/78 100 %   01/04/22 1230 98 °F (36.7 °C) 82 16 (!) 151/78 94 %   01/04/22 0940 -- 89 22 131/80 100 %   01/04/22 0935 -- 95 26 129/85 99 %   01/04/22 0921 -- 91 15 132/85 100 %   01/04/22 0839 98.6 °F (37 °C) 92 22 (!) 146/84 100 %     No intake or output data in the 24 hours ending 01/05/22 0758     I had a face to face encounter and independently examined this patient on 1/5/2022, as outlined below:  PHYSICAL EXAM:  General: WD, WN. Alert, cooperative, no acute distress    EENT:  EOMI. Anicteric sclerae. MMM  Resp:  CTA bilaterally, no wheezing or rales. No accessory muscle use  CV:  Regular  rhythm,  No edema  GI:  Soft, Non distended, Non tender. +Bowel sounds  Neurologic:  Alert and oriented X 3, normal speech,   Psych:   Good insight. Not anxious nor agitated  Skin:  No rashes. No jaundice    Reviewed most current lab test results and cultures  YES  Reviewed most current radiology test results   YES  Review and summation of old records today    NO  Reviewed patient's current orders and MAR    YES  PMH/ reviewed - no change compared to H&P  ________________________________________________________________________  Care Plan discussed with:    Comments   Patient     Family      RN     Care Manager     Consultant                        Multidiciplinary team rounds were held today with , nursing, pharmacist and clinical coordinator. Patient's plan of care was discussed; medications were reviewed and discharge planning was addressed.      ________________________________________________________________________  Total NON critical care TIME:    Total CRITICAL CARE TIME Spent:   Minutes non procedure based      Comments   >50% of visit spent in counseling and coordination of care     ________________________________________________________________________  [facility], MD     Procedures: see electronic medical records for all procedures/Xrays and details which were not copied into this note but were reviewed prior to creation of Plan. LABS:  I reviewed today's most current labs and imaging studies.   Pertinent labs include:  Recent Labs     01/03/22  1554   WBC 12.1*   HGB 8.9*   HCT 27.4*   *     Recent Labs     01/03/22  1554   MG 2.3   PHOS 4.2       Signed: Catie Alvarez MD

## 2022-01-05 NOTE — PROGRESS NOTES
Vascular Surgery Progress Note  Natacha Jensen ACNP-BC  1/5/2022       Subjective:     Annemarie Carrington a 46 y. o.  with a hx of ASHD w/ hx of cardiac stenting, HTN, HLD, HFrEF, ESRF on home HD, multiple DVTs, HIT, anemia, small bowel necrosis, and GERD.  He has been hospitalized several times over the last year. 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 His pacemaker was removed. He previously had a S-ICD in place that was placed prior to his infection in 2019. In November he was admitted for streptococcus intermedius bacteremia thought to be secondary to multiple dental caries. He completed a prolonged course of antibxs on 12/5/2021. He was readmitted to the hospital on 12/19 => 12/24/2021 with hemorraghic shock secondary to an upper GIB related to esophageal and gastric varices. He is s/p clipping. He received a total of 13 units of pRBCs, 3 units Plasma, 2 Cryoprecipitate, and 3 units Plts during that admission. His warfarin and ASA were discontinued. Plan was for repeat EGD prior to restarting anticoagulation. His hospital course was complicated by [id number] PNA. He was discharge on 12/24 only to return to the hospital on 12/26 w/ hematemesis and hypotension. He underwent EGD on 12/26. He had multiple gastric ulcers that were bleeding that were injected and two additional clips were placed. He had persistent anemia and hematemesis and he underwent a TIPS (transjuglar intrahepatic portosystemic shunt) procedure on 12/30. Following the procedure his hemoglobin and hematocrit stabilized. He was able to be weaned from vasopressor support. He was last tested for COVID-19 prior to his procedure on 1/4/2022 and he remains positive.      He is followed by the practice for multiple vascular issues:  He is s/p RUE bovine aVG (now w/o flow) & LUE [place] aVG 9/2020. He is s/p exploration of his LUE aVG for possible infection with BAP and thrombectomy of his graft in 4/2021. His surgical cxs were neg. In recently months he has needed multiple fistulograms with angioplasty due to chronic central venous syndrome. He is s/p BAP of his left SCA on 11/15/2021. Definite revision is recommended; however, patient has been to unwell for procedural intervention. He is s/p fistulogram with BAP on 1/3/2022 during this admission. He is followed for PAD. He has a remote hx of a left leg bypass graft. He presented this admission with a non-healing wound of the left foot. His duplex this admission revealed a patent bypass with distal outflow obstruction. He is scheduled for an arteriogram that is currently delayed due to COVID-19 positive status. During this admission he developed a right leg ileofemoral DVT after his warfarin was stopped for recurrent upper GI bleeding. His previous IVC was removed for infection. He is s/p right leg venous thrombectomy and insertion of IVC filter 1/4/2022. This am he complains of post procedure surgical pain and persistent proximal right leg and scrotal edema.       Nursing Data:     Patient Vitals for the past 24 hrs:   BP Temp Pulse Resp SpO2   01/05/22 0807 115/66 98.1 °F (36.7 °C) 98 18 95 %   01/05/22 0347 117/65 98.6 °F (37 °C) 94 18 95 %   01/05/22 0010 115/62 98.6 °F (37 °C) (!) 105 18 95 %   01/04/22 2027 110/68 99 °F (37.2 °C) 100 18 97 %   01/04/22 1743 (!) 115/57 99.1 °F (37.3 °C) (!) 113 20 94 %   01/04/22 1718 108/69 100 °F (37.8 °C) (!) 112 19 94 %   01/04/22 1617 115/69 99.4 °F (37.4 °C) (!) 108 18 96 %   01/04/22 1519 116/64 100.1 °F (37.8 °C) (!) 109 19 94 %   01/04/22 1439 130/75 98.9 °F (37.2 °C) 91 20 96 %   01/04/22 1415 (!) 115/58 -- 91 19 98 %   01/04/22 1400 118/71 -- 94 20 92 %   01/04/22 1345 120/70 98 °F (36.7 °C) 93 16 100 %   01/04/22 1330 122/63 -- 91 18 100 %   01/04/22 1315 132/62 -- 86 14 100 %   01/04/22 1300 129/65 -- 85 17 100 %   01/04/22 1245 129/63 -- 83 20 94 %   01/04/22 1240 135/64 -- 82 16 100 % 01/04/22 1235 139/70 -- 81 14 100 %   01/04/22 1232 (!) 151/78 98 °F (36.7 °C) 81 14 100 %   01/04/22 1230 (!) 151/78 98 °F (36.7 °C) 82 16 94 %       No intake or output data in the 24 hours ending 01/05/22 0956    Exam:     Physical Exam  Unable to be completed due to COVID-19 positive status. Dr. Steven Gonzalez to exam later this am .    Lab Review:     . No results found for this or any previous visit (from the past 24 hour(s)). Assessment/Plan:     Right leg ileofemoral DVT -s/p venous thrombectomy and IVC filter placement 1/4  · ACE wrap RLE PRN for swelling. Dry dressing changes to incisions daily. He will return to the clinic in two weeks for evaluation. Stable for discharge from a vascular standpoint. Peripheral arterial disease with ulceration of the left foot   · Patient is scheduled for arteriogram in two weeks on 1/17/2022 once his quarantine from COVID-19 positive status has been completed (COVID asymptomatic). His follow up information has been placed on his AVS.     Chronic left arm central venous stenosis with left arm aVG    -s/p multiple fistulograms with BAP the last on 1/3/2022   -s/p left SCA BAP 11/15/2021      · Follow-up as needed. Definite revision once comorbid conditions have resolved and/or are stable.      Persistent COVID-19 positive status -asymptomatic. Initial dx 12/11 & last positive test 1/4/2022. Acute hypoxic respiratory failure  Mechanical intubation 12/26 => 12/29  Pulmonary hypertension     Hemorrhagic shock -Resolved  Anemia of acute blood loss with underlying anemia of chronic disease  Recurrent upper gastrointestinal hemorrhage  Portal hypertension  Esophageal and gastric varices   -Status post recent injection and clipping x4.   -on Octreotide and PPI   -s/p TIPS 12/30/2021  · Plan per gastroenterology/IR.     End-stage renal failure  -Home dialysis 5 times per week   Hyperkalemia      History of hypertension  Hyperlipidemia  Coronary artery disease -HFrEF-41%  History of ventricular tachycardia     Seizure disorder     Chronic pain syndrome with history of opioid use     VTE Prophylaxis:  History of multiple thrombotic events including left lower extremity, left brachial, and right IJ DVTs. -IVC filter replaced 1/4  History of HIT   -SCDs contraindicated due to PAD. -Warfarin currently contraindicated due to life-threatening GI bleed     Disposition:  Home. Stable for discharge from a vascular standpoint. Vascular surgery signing off. We appreciate the opportunity to participate in the care of Mr. Rajni Garrett. Patient should f/u in 2 weeks with Dr. Austin Suh as written on his AVS.  Please reconsult as needed.

## 2022-01-05 NOTE — PROGRESS NOTES
GI Progress Note   Maria Luz Camejo NP  NAME:Claude Sammie Greek :1975 [facility]:[id number]   Prim GI: Amy Cabrera MD  PCP: Ken Alston MD  Date/Time:  2022 10:15 AM   Assessment:   · Recurrent severe hematemesis  · Massive PRBC's transfusion since 23  · Anemia, post-hemorrhagic  · Has had now four EGD's most recently on  with bleeding GV and severe portal hypertensive gastropathy  · Severe pulmonary HTN  · ESRD on HD     Plan:   · Portal pressures not consistent with portal HTN, TIPS procedure not preformed. Bleeding from gastric varices are not easily fixed at this time. Hgb stable at this time. No overt GI bleeding   · If re-bleeding occurs would consider repeat EGD to r/o Dieulafoy lesion of stomach  · Continue PPI BID and Carafate QID   · Monitor for s/s of bleeding; transfuse as clinically indicated  · Serial H&H; goal for hgb >7.0  · Symptomatic care per primary team   · Discharge planning per primary team  · Nothing further to add from a GI standpoint. GI signing-off. Please call with any questions. Thank you for this consult. *Plan of care discussed with Dr. Raul Koo:   Patient resting in bed. Patient resting in bed. About to start HD. Patient reports improvement to his abdominal discomfort. Review of Systems:  Symptom Y/N Comments  Symptom Y/N Comments   Fever/Chills n   Chest Pain n    Cough n   Headaches n    Sputum n   Joint Pain n    SOB/ZAMUDIO n   Pruritis/Rash n    Tolerating Diet y   Other       Could NOT obtain due to:      Objective:   VITALS:   Last 24hrs VS reviewed since prior progress note. Most recent are:  Visit Vitals  /66 (BP 1 Location: Right lower arm, BP Patient Position: At rest)   Pulse 98   Temp 98.1 °F (36.7 °C)   Resp 18   Ht 5' 7\" (1.702 m)   Wt 65.3 kg (143 lb 15.4 oz)   SpO2 95%   BMI 22.55 kg/m²     No intake or output data in the 24 hours ending 22 1016  PHYSICAL EXAM:  Physical Exam  Vitals and nursing note reviewed. Constitutional:       General: He is not in acute distress. HENT:      Head: Normocephalic and atraumatic. Nose: Nose normal.   Cardiovascular:      Rate and Rhythm: Normal rate and regular rhythm. Pulmonary:      Effort: Pulmonary effort is normal.   Abdominal:      General: Abdomen is flat. Bowel sounds are normal.      Palpations: Abdomen is soft. Musculoskeletal:      Cervical back: Normal range of motion. Skin:     General: Skin is warm and dry. Neurological:      General: No focal deficit present. Mental Status: He is alert. Psychiatric:         Mood and Affect: Mood normal.         Behavior: Behavior normal.           Lab and Radiology Data Reviewed: (see below)    Medications Reviewed: (see below)  PMH/SH reviewed - no change compared to H&P  ________________________________________________________________________  Care Plan discussed with:  Patient x   Family     RN x              Consultant:  Hospitalist      Renuka Galvan NP     Procedures: see electronic medical records for all procedures/Xrays and details which were not copied into this note but were reviewed prior to creation of Plan. LABS:  Recent Labs     01/03/22  1554   WBC 12.1*   HGB 8.9*   HCT 27.4*   *     Recent Labs     01/03/22  1554   MG 2.3   PHOS 4.2     No results for input(s): AP, TBIL, TP, ALB, GLOB, GGT, AML, LPSE in the last 72 hours. No lab exists for component: SGOT, GPT, AMYP, HLPSE  No results for input(s): INR, PTP, APTT, INREXT, INREXT in the last 72 hours. No results for input(s): FE, TIBC, PSAT, FERR in the last 72 hours. Lab Results   Component Value Date/Time    Folate 7.5 11/23/2011 11:26 AM     No results for input(s): PH, PCO2, PO2 in the last 72 hours. No results for input(s): CPK, CKMB in the last 72 hours.     No lab exists for component: TROPONINI  Lab Results   Component Value Date/Time    Color RED 11/16/2012 05:15 PM    Appearance OPAQUE 11/16/2012 05:15 PM Specific gravity 1.020 11/16/2012 05:15 PM    Specific gravity 1.011 09/17/2012 01:35 AM    pH (UA) 8.0 11/16/2012 05:15 PM    Protein >300 (A) 11/16/2012 05:15 PM    Glucose 100 (A) 11/16/2012 05:15 PM    Ketone NEGATIVE  11/16/2012 05:15 PM    Bilirubin NEGATIVE  09/17/2012 01:35 AM    Urobilinogen 0.2 11/16/2012 05:15 PM    Nitrites NEGATIVE  11/16/2012 05:15 PM    Leukocyte Esterase NEGATIVE  11/16/2012 05:15 PM    Epithelial cells 5-10 11/16/2012 05:15 PM    Bacteria 3+ (A) 11/16/2012 05:15 PM    WBC >100 (H) 11/16/2012 05:15 PM    RBC >100 (H) 11/16/2012 05:15 PM       MEDICATIONS:  Current Facility-Administered Medications   Medication Dose Route Frequency    oxyCODONE IR (ROXICODONE) tablet 10 mg  10 mg Oral Q4H PRN    And    acetaminophen (TYLENOL) tablet 325 mg  325 mg Oral Q4H PRN    sucralfate (CARAFATE) tablet 1 g  1 g Oral AC&HS    pantoprazole (PROTONIX) tablet 40 mg  40 mg Oral ACB&D    alum-mag hydroxide-simeth (MYLANTA) oral suspension 30 mL  30 mL Oral Q4H PRN    balsam peru-castor oiL (VENELEX) ointment   Topical BID    oxyCODONE-acetaminophen (PERCOCET) 5-325 mg per tablet 1 Tablet  1 Tablet Oral Q4H PRN    sodium chloride (NS) flush 5-40 mL  5-40 mL IntraVENous Q8H    sodium chloride (NS) flush 5-40 mL  5-40 mL IntraVENous PRN    acetaminophen (TYLENOL) tablet 650 mg  650 mg Oral Q6H PRN    Or    acetaminophen (TYLENOL) suppository 650 mg  650 mg Rectal Q6H PRN    polyethylene glycol (MIRALAX) packet 17 g  17 g Oral DAILY PRN    ondansetron (ZOFRAN) injection 4 mg  4 mg IntraVENous Q6H PRN    alcohol 62% (NOZIN) nasal  1 Ampule  1 Ampule Topical Q12H    epoetin emilie-epbx (RETACRIT) injection 20,000 Units  20,000 Units SubCUTAneous Q MON, WED & FRI

## 2022-01-05 NOTE — PROGRESS NOTES
End of Shift Note    Bedside shift change report given to Deni Chiang (oncoming nurse) by Alfredo Perez RN (offgoing nurse). Report included the following information SBAR, Kardex, ED Summary, Intake/Output, MAR and Recent Results    Shift worked:  1026-2036     Shift summary and any significant changes:    Pt had dialysis today and tolerated well, pt wants to work with PT/OT prior to D/C, pt still c/o upper r leg and scrotal pain.  Prn oxycodone given with tylenol.  scroum elevated on pillow to decrease swelling, sacral dressing changed   Concerns for physician to address: none   Zone phone for oncoming shift:  none

## 2022-01-05 NOTE — DISCHARGE INSTRUCTIONS
Discharge Instructions After Vascular Surgery   Home care   Check your incision every day for signs of infection such as swelling, redness, warmth, or drainage.  Dont bathe or soak in a tub or go swimming until your incisions are well healed (usually at least 2 weeks). You can shower to keep your incisions clean. Just make sure you dry them well afterward. Dry dressing changes daily. Activity   Dont drive while taking pain medication.  Expect to start walking soon after surgery. Walking helps reduce swelling and helps your incision heal.    Dont stand or sit with your feet down for long. When you sit, raise your feet as high as you comfortably can.  Take your medicines exactly as directed. Dont skip doses.  Avoid skin burns by testing the temperature of shower water before you get in.  Wear slippers or shoes when walking. Dont go barefoot or wear open-toed shoes. Follow-up   See your doctor to have your staples removed 2 weeks after your surgery. When to call your healthcare provider    Call your provider right away if you have any of the following:   Fever of 100.4°F (38°C)   Signs of infection (redness, swelling, or warmth at the incision site)   Drainage from your incision   Changes in color, temperature, feeling, or movement in either leg or foot   Increasing pain or numbness in your foot or leg   Leg swelling that doesn't get better overnight   Chest pain or trouble breathing    Long-term changes at home   Eat a healthy, low-fat, low cholesterol, and low calorie diet.  Maintain your ideal body weight.  After you have recovered from surgery, try to exercise more, especially walking.  If you smoke, ask your primary doctor for help quitting.       Please call the office at [phone] for any issues

## 2022-01-05 NOTE — DIALYSIS
Hemodialysis / [phone]    Vitals Pre Post Assessment Pre Post   BP BP: 126/73 (01/05/22 1300) 126/72 LOC A&O x 3 A&O x 3   HR Pulse (Heart Rate): (!) 106 (01/05/22 1300) 89 Lungs clear clear   Resp Resp Rate: 20 (01/05/22 1300) 20 Cardiac regular regular   Temp Temp: 98 °F (36.7 °C) (01/05/22 1110) 99 Skin warm warm   Weight Pre-Dialysis Weight:  (not working) (01/05/22 1110) Not working Edema generalized generalized   Tele status remote remote Pain Pain Intensity 1: 8 (01/05/22 0807) 5/10     Orders   Duration: Start: 1115 End: 1445 Total: 3.5 hr   Dialyzer: Dialyzer/Set Up Inspection: Shyla ([id number]) (01/05/22 1110)   K Bath: Dialysate K (mEq/L): 2 (01/05/22 1110)   Ca Bath: Dialysate CA (mEq/L): 3.0 (01/05/22 1110)   Na: Dialysate NA (mEq/L): 140 (01/05/22 1110)   Bicarb: Dialysate HCO3 (mEq/L): 40 (01/05/22 1110)   Target Fluid Removal: Goal/Amount of Fluid to Remove (mL): 2000 mL (01/05/22 1110)     Access   Type & Location: SUZANNE AV   Comments:            Patent, B&T positive, cannulated with 15 g needles x 2.                             Labs   HBsAg (Antigen) / date:      12/01/21 Negative                                         HBsAb (Antibody) / date: 11/11/21 Immune   Source: [facility]   Obtained/Reviewed  Critical Results Called HGB   Date Value Ref Range Status   01/05/2022 8.1 (L) 12.1 - 17.0 g/dL Final     Potassium   Date Value Ref Range Status   01/05/2022 3.6 3.5 - 5.1 mmol/L Final     Calcium   Date Value Ref Range Status   01/05/2022 7.8 (L) 8.5 - 10.1 MG/DL Final     BUN   Date Value Ref Range Status   01/05/2022 41 (H) 6 - 20 MG/DL Final     Creatinine   Date Value Ref Range Status   01/05/2022 6.01 (H) 0.70 - 1.30 MG/DL Final        Meds Given   Name Dose Route                    Adequacy / Fluid    Total Liters Process: 86 L   Net Fluid Removed: 2000 ml      Comments   Time Out Done:   (Time) Yes,1110   Admitting Diagnosis: Renal failure   Consent obtained/signed: Informed Consent Verified: Yes (01/03/22 9666)   Machine / RO # Machine Number: [id number] (01/05/22 1110)   Primary Nurse Rpt Pre: Asa Mccabe RN   Primary Nurse Rpt Post: Asa Mccabe RN   Pt Education: Yes, r/t dialysis process   Care Plan: Continue HD as ordered   Pts outpatient clinic:      Tx Summary   Comments: Tolerated tx well, at end, all blood in circuit returned with 300 ml NS, SUZANNE AVG patent, B&T positive, bleeding stopped at both sites, pressure dressing in place.

## 2022-01-05 NOTE — PROGRESS NOTES
End of Shift Note    Bedside shift change report given to [place] (oncoming nurse) by Arnaldo Muñoz (offgoing nurse). Report included the following information SBAR, Kardex, Intake/Output, MAR and Recent Results    Shift worked:  7p-7a     Shift summary and any significant changes:     Pt stable through shift. Pt complained of pain through night d/t right leg surgery. Pt having swelling in the upper right leg and scrotum. PO pain medication given q4h. Pt still requesting medication for breakthrough pain. IV dilaudid x1, IV fentanly x1. Labs to be drawn with HD. Pt got on bedpan several times, but only passed gas. Concerns for physician to address:  Pt says he will need mobility devices at d/c d/t recently surgery on right leg. Labs to be drawn with HD today. Zone phone for [facility] shift:   [phone]       Activity:  Activity Level: Bed Rest  Number times ambulated in hallways past shift: 0  Number of times OOB to chair past shift: 0    Cardiac:   Cardiac Monitoring: No      Cardiac Rhythm: Sinus Rhythm    Access:   Current line(s): PIV and HD access     Genitourinary:   Urinary status: anuric    Respiratory:   O2 Device: None (Room air)  Chronic home O2 use?: NO  Incentive spirometer at bedside: NO     GI:  Last Bowel Movement Date: 01/04/22  Current diet:  ADULT DIET Regular; Low Potassium (Less than 3000 mg/day); Low Phosphorus (Less than 1000 mg)  ADULT ORAL NUTRITION SUPPLEMENT Lunch; Clear Liquid  ADULT ORAL NUTRITION SUPPLEMENT Breakfast, Dinner; Renal Supplement  Passing flatus: YES  Tolerating current diet: YES       Pain Management:   Patient states pain is manageable on current regimen: NO    Skin:  Yong Score: 14  Interventions: increase time out of bed and PT/OT consult    Patient Safety:  Fall Score:  Total Score: 5  Interventions: gripper socks, pt to call before getting OOB and stay with me (per policy)  High Fall Risk: Yes    Length of Stay:  Expected LOS: 4d 9h  Actual LOS: [address] [facility]

## 2022-01-05 NOTE — PROGRESS NOTES
Nephrology Progress Note  Blaise Leblanc     [facility]. [facility]  Phone - [phone]   Patient: Edu Carbajal    YOB: 1975        Date- 1/5/2022   Admit Date: 12/26/2021  CC: Follow up for esrd          IMPRESSION & PLAN:   · ESRD - home HD 5 days per week- Follows up with Dr Sheela Najera at [facility]  · Covid 19 infection  · Right leg ileofemoral DVT -s/p venous thrombectomy and IVC filter placement 1/4  · H/o esophageal bleed from esophagitis  · Left arm swelling- s/p angioplasty of left subclavian vein  · Gram positive sepsis from dental abcess  · s/p lead extraction at [facility]  · Anemia of ckd  · Hx of renal tx in past times 2.  · Hypertension  · CAD  · Failed RTX times 2  · H/o DVT, s/p ivc filter/ h/o HIT      PLAN-   Seen on hd today   Continue epogen   Appreciate vas. Surgery input     Subjective: Interval History:   -last 24 hour notes reviewed          Objective:   Vitals:    01/04/22 2027 01/05/22 0010 01/05/22 0347 01/05/22 0807   BP: 110/68 115/62 117/65 115/66   Pulse: 100 (!) 105 94 98   Resp: 18 18 18 18   Temp: 99 °F (37.2 °C) 98.6 °F (37 °C) 98.6 °F (37 °C) 98.1 °F (36.7 °C)   TempSrc:       SpO2: 97% 95% 95% 95%   Weight:       Height:          No intake/output data recorded. Last 3 Recorded Weights in this Encounter    12/27/21 0700 12/28/21 0400 01/04/22 0839   Weight: 65.8 kg (145 lb 1 oz) 65.3 kg (143 lb 15.4 oz) 65.3 kg (143 lb 15.4 oz)      Physical exam:   GEN: nad  NECK- Supple  RESP: no distress  NEURO:non focal    Due to the COVID-19 pandemic, in order to reduce the spread and transmission of the virus, some basic elements of the physical exam have been deferred to reduce direct or close contact with the patient . Chart reviewed. Pertinent Notes reviewed.      Data Review :  Recent Labs     01/03/22  1554   MG 2.3   PHOS 4.2     Recent Labs     01/03/22  1554   WBC 12.1*   HGB 8.9*   HCT 27.4*   *     No results for input(s): FE, TIBC, PSAT, FERR in the last 72 hours.    Medication list  reviewed  Current Facility-Administered Medications   Medication Dose Route Frequency    oxyCODONE IR (ROXICODONE) tablet 10 mg  10 mg Oral Q4H PRN    And    acetaminophen (TYLENOL) tablet 325 mg  325 mg Oral Q4H PRN    sucralfate (CARAFATE) tablet 1 g  1 g Oral AC&HS    pantoprazole (PROTONIX) tablet 40 mg  40 mg Oral ACB&D    alum-mag hydroxide-simeth (MYLANTA) oral suspension 30 mL  30 mL Oral Q4H PRN    balsam peru-castor oiL (VENELEX) ointment   Topical BID    oxyCODONE-acetaminophen (PERCOCET) 5-325 mg per tablet 1 Tablet  1 Tablet Oral Q4H PRN    sodium chloride (NS) flush 5-40 mL  5-40 mL IntraVENous Q8H    sodium chloride (NS) flush 5-40 mL  5-40 mL IntraVENous PRN    acetaminophen (TYLENOL) tablet 650 mg  650 mg Oral Q6H PRN    Or    acetaminophen (TYLENOL) suppository 650 mg  650 mg Rectal Q6H PRN    polyethylene glycol (MIRALAX) packet 17 g  17 g Oral DAILY PRN    ondansetron (ZOFRAN) injection 4 mg  4 mg IntraVENous Q6H PRN    alcohol 62% (NOZIN) nasal  1 Ampule  1 Ampule Topical Q12H    epoetin emilie-epbx (RETACRIT) injection 20,000 Units  20,000 Units SubCUTAneous Q MON, WED & FRI          Jenna Mera MD              [facility]  [facility] / [facility], [address], [address]  Phone - [phone]               Fax - [phone]

## 2022-01-06 LAB
B PERT DNA SPEC QL NAA+PROBE: NOT DETECTED
BORDETELLA PARAPERTUSSIS PCR, BORPAR: NOT DETECTED
C PNEUM DNA SPEC QL NAA+PROBE: NOT DETECTED
FLUAV H1 2009 PAND RNA SPEC QL NAA+PROBE: NOT DETECTED
FLUAV H1 RNA SPEC QL NAA+PROBE: NOT DETECTED
FLUAV H3 RNA SPEC QL NAA+PROBE: NOT DETECTED
FLUAV SUBTYP SPEC NAA+PROBE: NOT DETECTED
FLUBV RNA SPEC QL NAA+PROBE: NOT DETECTED
HADV DNA SPEC QL NAA+PROBE: NOT DETECTED
HCOV 229E RNA SPEC QL NAA+PROBE: NOT DETECTED
HCOV HKU1 RNA SPEC QL NAA+PROBE: NOT DETECTED
HCOV NL63 RNA SPEC QL NAA+PROBE: NOT DETECTED
HCOV OC43 RNA SPEC QL NAA+PROBE: NOT DETECTED
HMPV RNA SPEC QL NAA+PROBE: NOT DETECTED
HPIV1 RNA SPEC QL NAA+PROBE: NOT DETECTED
HPIV2 RNA SPEC QL NAA+PROBE: NOT DETECTED
HPIV3 RNA SPEC QL NAA+PROBE: NOT DETECTED
HPIV4 RNA SPEC QL NAA+PROBE: NOT DETECTED
M PNEUMO DNA SPEC QL NAA+PROBE: NOT DETECTED
RSV RNA SPEC QL NAA+PROBE: NOT DETECTED
RV+EV RNA SPEC QL NAA+PROBE: NOT DETECTED
SARS-COV-2 PCR, COVPCR: NOT DETECTED

## 2022-01-06 PROCEDURE — 97530 THERAPEUTIC ACTIVITIES: CPT

## 2022-01-06 PROCEDURE — 74011250637 HC RX REV CODE- 250/637: Performed by: SURGERY

## 2022-01-06 PROCEDURE — 74011000250 HC RX REV CODE- 250: Performed by: SURGERY

## 2022-01-06 PROCEDURE — 74011250637 HC RX REV CODE- 250/637: Performed by: NURSE PRACTITIONER

## 2022-01-06 PROCEDURE — 0202U NFCT DS 22 TRGT SARS-COV-2: CPT

## 2022-01-06 PROCEDURE — 97535 SELF CARE MNGMENT TRAINING: CPT

## 2022-01-06 PROCEDURE — 65270000015 HC RM PRIVATE ONCOLOGY

## 2022-01-06 PROCEDURE — 97162 PT EVAL MOD COMPLEX 30 MIN: CPT

## 2022-01-06 PROCEDURE — 97165 OT EVAL LOW COMPLEX 30 MIN: CPT

## 2022-01-06 RX ADMIN — PANTOPRAZOLE SODIUM 40 MG: 40 TABLET, DELAYED RELEASE ORAL at 17:23

## 2022-01-06 RX ADMIN — OXYCODONE AND ACETAMINOPHEN 1 TABLET: 5; 325 TABLET ORAL at 10:35

## 2022-01-06 RX ADMIN — SODIUM CHLORIDE, PRESERVATIVE FREE 10 ML: 5 INJECTION INTRAVENOUS at 06:00

## 2022-01-06 RX ADMIN — Medication 1 AMPULE: at 21:39

## 2022-01-06 RX ADMIN — PANTOPRAZOLE SODIUM 40 MG: 40 TABLET, DELAYED RELEASE ORAL at 10:34

## 2022-01-06 RX ADMIN — SUCRALFATE 1 G: 1 TABLET ORAL at 21:40

## 2022-01-06 RX ADMIN — SUCRALFATE 1 G: 1 TABLET ORAL at 17:23

## 2022-01-06 RX ADMIN — OXYCODONE 10 MG: 5 TABLET ORAL at 21:40

## 2022-01-06 RX ADMIN — SODIUM CHLORIDE, PRESERVATIVE FREE 10 ML: 5 INJECTION INTRAVENOUS at 17:23

## 2022-01-06 RX ADMIN — SODIUM CHLORIDE, PRESERVATIVE FREE 10 ML: 5 INJECTION INTRAVENOUS at 21:44

## 2022-01-06 RX ADMIN — OXYCODONE 10 MG: 5 TABLET ORAL at 03:01

## 2022-01-06 RX ADMIN — Medication: at 10:35

## 2022-01-06 RX ADMIN — SUCRALFATE 1 G: 1 TABLET ORAL at 10:35

## 2022-01-06 RX ADMIN — Medication 1 AMPULE: at 10:35

## 2022-01-06 RX ADMIN — Medication: at 17:23

## 2022-01-06 RX ADMIN — SUCRALFATE 1 G: 1 TABLET ORAL at 13:56

## 2022-01-06 NOTE — PROGRESS NOTES
Problem: Mobility Impaired (Adult and Pediatric)  Goal: *Acute Goals and Plan of Care (Insert Text)  Description: FUNCTIONAL STATUS PRIOR TO ADMISSION: Patient was modified independent using a rollator for functional mobility. Patient was independent for basic and instrumental ADLs. HOME SUPPORT PRIOR TO ADMISSION: The patient lived with sister but did not require assist. Lives in second floor duplex with 15 steps to enter home. Physical Therapy Goals  Initiated 1/6/2022  1. Patient will move from supine to sit and sit to supine , scoot up and down, and roll side to side in bed with modified independence within 7 day(s). 2.  Patient will transfer from bed to chair and chair to bed with modified independence using the least restrictive device within 7 day(s). 3.  Patient will perform sit to stand with modified independence within 7 day(s). 4.  Patient will ambulate with supervision/set-up for 90 feet with the least restrictive device within 7 day(s). 5.  Patient will ascend/descend 15 stairs with 1 handrail(s) with minimal assistance/contact guard assist within 7 day(s). Outcome: Not Met  PHYSICAL THERAPY EVALUATION  Patient: Augustina Enriquez (31 y.o. male)  Date: 1/6/2022  Primary Diagnosis: GIB (gastrointestinal bleeding) [K92.2]  Procedure(s) (LRB):  RIGHT LEG  VENOUS THROMBECTOMY AND INSERTION OF IVC FILTER (Right) 2 Days Post-Op   Precautions:  Fall,Skin,Contact (droplet Plus)    ASSESSMENT  Based on the objective data described below, the patient presents with generalized weakness, increased pain and edema in R thigh/hip with decreased tolerance with WB onto RLE and decreased R hip ROM, impaired standing balance and decreased mobility skills well below modified independent PLOF. He was sitting on the edge of bed after working with OT earlier and able to participate in RLE exercises to improve ROM and improve activation of R quad.  Stood with light 2 person assistance with RW and worked on stepping with each LE - needed cues to improve performance and increase R knee extension in stance. Left patient sitting on side of bed to eat. Current Level of Function Impacting Discharge (mobility/balance): max a supine to sit; min a x 2 sit to stand; deferred gait this session due to increased RLE pain    Functional Outcome Measure: The patient scored 45/100 on the Barthel outcome measure which is indicative of being partially dependent with ADLs and mobility at this time. Other factors to consider for discharge: modified independent PLOF; lives with sister in 2nd floor duplex with 15 steps; ESRD with HD     Patient will benefit from skilled therapy intervention to address the above noted impairments. PLAN :  Recommendations and Planned Interventions: bed mobility training, transfer training, gait training, therapeutic exercises, neuromuscular re-education, edema management/control, patient and family training/education, and therapeutic activities      Frequency/Duration: Patient will be followed by physical therapy:  3 times a week to address goals. Recommendation for discharge: (in order for the patient to meet his/her long term goals)  Therapy 3 hours per day 5-7 days per week    This discharge recommendation:  Has not yet been discussed the attending provider and/or case management    IF patient discharges home will need the following DME: bedside commode     SUBJECTIVE:   Patient stated  this is my first time up from the bed since surgery.     OBJECTIVE DATA SUMMARY:   HISTORY:    Past Medical History:   Diagnosis Date    Abdominal hematoma     AICD (automatic cardioverter/defibrillator) present     CAD (coronary artery disease)     anterior MI s/p 2 stents  2007    Chronic abdominal pain     Chronic kidney disease     ESRD; Dialysis dependent.  Home [facility] does labs- [facility] tpke    DVT (deep venous thrombosis) ([facility] [address] 2001    DVT of popliteal vein ([facility] [address] 11/2011    not anticoagulated due to bleeding, IVC filter    Endocarditis     Gallstone pancreatitis     Gastrointestinal disorder     acid reflux    Gastrointestinal disorder     peptic ulcer    Hemodialysis patient ([facility] [address]     High cholesterol     Hypertension     Kidney transplant     b/l kidneys 1995, 2001    Long term current use of anticoagulant therapy     Nephrotic syndrome     Other ill-defined conditions(799.89)     kidny transplant x2,  on dialysis    Other ill-defined conditions(799.89)     home dialysis    Other ill-defined conditions(799.89)     hx recurrent left leg DVT    Peritonitis ([facility] [address]     Seizures ([facility] [address] 2015    most recent- only had three in lifetime    Small bowel obstruction (HCC)     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    V-tach ([facility] [address]      Past Surgical History:   Procedure Laterality Date    HX APPENDECTOMY      HX CHOLECYSTECTOMY      HX OTHER SURGICAL  11/2011    exploratory laparotomy    HX OTHER SURGICAL      fem-pop    HX OTHER SURGICAL  02/2013    right upper extremity fistula    HX PACEMAKER Left 6/2009    AICD-st latonya-not connected    HX PACEMAKER Left     AICD-left side-BS-working    HX SMALL BOWEL RESECTION      HX TRANSPLANT  2001     Kidney    HX TRANSPLANT  1992    kidney    HX VASCULAR ACCESS Right     femoral access for HD- does 5 day dialysis @ home    IR INSERT NON TUNL CVC OVER 5 YRS  12/7/2021    IR INSERT NON TUNL CVC OVER 5 YRS  12/10/2021    IR INSERT TIPS HEPATIC SHUNT  12/30/2021    IR REMOVE TUNL CVAD W/O PORT / PUMP  10/29/2020    IR REPLACE CVC TUNNELED W/O PORT  9/10/2020    AL CARDIAC SURG PROCEDURE UNLIST  2007    2 stents    AL EDG US EXAM SURGICAL ALTER STOM DUODENUM/JEJUNUM  7/15/2011         AL ESOPHAGOGASTRODUODENOSCOPY TRANSORAL DIAGNOSTIC  5/24/2012         UPPER GI ENDOSCOPY,CTRL BLEED  12/6/2021         UPPER GI ENDOSCOPY,DIAGNOSIS  12/8/2021         VASCULAR SURGERY PROCEDURE UNLIST  11/14/2017    Insertion AV graft right upper arm (bovine); ligation of AV fistula right arm       Personal factors and/or comorbidities impacting plan of care: CAD with AICD, ESRD/HD, recurrent DVTs with IVC filter placed this admission. Home Situation  Home Environment: Private residence (Duplex-resides upstairs)  # Steps to Enter: 15  Rails to Enter: Yes  Hand Rails : Right  One/Two Story Residence: One story (2nd floor of duplex)  Living Alone: No  Support Systems: Other Family Member(s) (sister)  Patient Expects to be Discharged to[de-identified] Rehabilitation facility  Current DME Used/Available at Home: Shraddha Rota, rollator  Tub or Shower Type: Tub/Shower combination    EXAMINATION/PRESENTATION/DECISION MAKING:   Critical Behavior:  Neurologic State: Alert  Orientation Level: Oriented X4  Cognition: Appropriate decision making,Appropriate for age attention/concentration,Appropriate safety awareness  Safety/Judgement: Awareness of environment,Home safety,Insight into deficits  Hearing: Auditory  Auditory Impairment: None  Hearing Aids/Status: Does not own  Skin:  dressing on R upper thigh cdi  Edema: large amount of R proximal thigh edema  Range Of Motion:  AROM: Generally decreased, functional (RLE; BUE WFL)           PROM: Generally decreased, functional (RLE; BUE WFL)           Strength:    Strength: Generally decreased, functional (RLE; BUE WFL)                    Tone & Sensation:   Tone: Normal              Sensation: Impaired (hypersensitivity to R thigh)               Coordination:  Coordination: Generally decreased, functional (RLE; BUE WFL)  Vision:   Acuity: Within Defined Limits  Functional Mobility:  Bed Mobility:  Rolling: Minimum assistance  Supine to Sit: Maximum assistance (secondary to RLE pain)        Transfers:  Sit to Stand: Minimum assistance;Assist x2  Stand to Sit: Minimum assistance;Assist x2                       Balance:   Sitting: Intact  Standing: Impaired; With support  Standing - Static: Good;Fair;Constant support  Standing - Dynamic : Fair;Constant support  Ambulation/Gait Training:              Gait Description (WDL):  (performed practice steps fwd/bwd with each leg and side step)     Right Side Weight Bearing: As tolerated  Left Side Weight Bearing: Full                            Therapeutic Exercises: Ankle pumps, quad sets; aarom laq - 5-10 reps as tolerated    Functional Measure:  Barthel Index:    Bathin  Bladder: 5  Bowels: 5  Groomin  Dressin  Feeding: 10  Mobility: 0  Stairs: 0  Toilet Use: 5  Transfer (Bed to Chair and Back): 10  Total: 45/100       The Barthel ADL Index: Guidelines  1. The index should be used as a record of what a patient does, not as a record of what a patient could do. 2. The main aim is to establish degree of independence from any help, physical or verbal, however minor and for whatever reason. 3. The need for supervision renders the patient not independent. 4. A patient's performance should be established using the best available evidence. Asking the patient, friends/relatives and nurses are the usual sources, but direct observation and common sense are also important. However direct testing is not needed. 5. Usually the patient's performance over the preceding 24-48 hours is important, but occasionally longer periods will be relevant. 6. Middle categories imply that the patient supplies over 50 per cent of the effort. 7. Use of aids to be independent is allowed. Score Interpretation (from [address] [address])    Independent   60-79 Minimally independent   40-59 Partially dependent   20-39 Very dependent   <20 Totally dependent     -Edyta Harden., Barthel, D.W. (1965). Functional evaluation: the Barthel Index. [facility] ([address] (1997). The Barthel activities of daily living index: self-reporting versus actual performance in the old (> or = 75 years).  Journal of [address] [place] [address] 45(7), [facility], J.JJOHN, Abhijit Echols., Alonso Valdovinos. (1999). Measuring the change in disability after inpatient rehabilitation; comparison of the responsiveness of the Barthel Index and Functional [place] Measure. Journal of Neurology, Neurosurgery, and Psychiatry, 66(4), 930-550. ELEAZAR Thacker, GAMAL Payton, & Sid Lugo M.A. (2004) Assessment of post-stroke quality of life in cost-effectiveness studies: The usefulness of the Barthel Index and the EuroQoL-5D. Quality of Life Research, 15, 159-97            Physical Therapy Evaluation Charge Determination   History Examination Presentation Decision-Making   HIGH Complexity :3+ comorbidities / personal factors will impact the outcome/ POC  MEDIUM Complexity : 3 Standardized tests and measures addressing body structure, function, activity limitation and / or participation in recreation  MEDIUM Complexity : Evolving with changing characteristics  Other outcome measures Barthel  HIGH       Based on the above components, the patient evaluation is determined to be of the following complexity level: MEDIUM    Pain Ratin/10 R upper thigh with supine to sit transfers; 3/10 sitting on edge of bed    Activity Tolerance:   Fair, SpO2 stable on RA, and requires rest breaks    After treatment patient left in no apparent distress:   Call bell within reach and sitting on side of bed    COMMUNICATION/EDUCATION:   The patients plan of care was discussed with: Occupational therapist and Registered nurse. Fall prevention education was provided and the patient/caregiver indicated understanding., Patient/family have participated as able in goal setting and plan of care. , and Patient/family agree to work toward stated goals and plan of care.     Thank you for this referral.  Mary Ellen Sharma, PT   Time Calculation: 18 mins

## 2022-01-06 NOTE — PROGRESS NOTES
Comprehensive Nutrition Assessment    Type and Reason for Visit: Reassess    Nutrition Recommendations/Plan:   Continue current diet and supplements  Please document % meals and supplements consumed in flowsheet I/O's under intake     Nutrition Assessment:     Chart reviewed. RD unable to visit bedside d/t covid isolation, pt tested positive for covid-19 on 1/4 but remains asymptomatic. Unable to speak with pt over the phone as the line was busy during attempts to call. Fair appetite documented. Anticipate d/c tomorrow pending PCR covid test and bed at facility. Continue to encourage intake of meals and supplements. Wt Readings from Last 5 Encounters:   01/04/22 65.3 kg (143 lb 15.4 oz)   12/21/21 61.3 kg (135 lb 1.6 oz)   11/17/21 66.1 kg (145 lb 12.8 oz)   04/07/21 66.8 kg (147 lb 4.3 oz)   03/27/21 67.4 kg (148 lb 9.4 oz)   ]    Estimated Daily Nutrient Needs:  Energy (kcal): 1951 (BMR 1501 x 1. 3AF); Weight Used for Energy Requirements: Current  Protein (g): 65-78 (1.0-1.2 g/kg bw); Weight Used for Protein Requirements: Current  Fluid (ml/day): 1886-9961 ml/day; Method Used for Fluid Requirements: 1 ml/kcal      Nutrition Related Findings:  No recent labs. Meds: roxicodone, percocet, carafate. Edema: 3+ generalized & facial, 1+pitting LLE, 2+pitting RLE. BM 1/5. Wounds:    Pressure injury,Stage II,Surgical incision       Current Nutrition Therapies:  ADULT DIET Regular; Low Potassium (Less than 3000 mg/day); Low Phosphorus (Less than 1000 mg)  ADULT ORAL NUTRITION SUPPLEMENT Lunch; Clear Liquid  ADULT ORAL NUTRITION SUPPLEMENT Breakfast, Dinner; Renal Supplement    Anthropometric Measures:  · Height:  5' 7\" (170.2 cm)  · Current Body Wt:  65.3 kg (143 lb 15.4 oz)   · Ideal Body Wt:  148 lbs:  97.3 %   · BMI Category:  Normal weight (BMI 18.5-24. 9)       Nutrition Diagnosis:   · Inadequate protein-energy intake related to altered GI function as evidenced by NPO or clear liquid status due to medical condition  Dx improved, diet advanced with fair appetite.     Nutrition Interventions:   Food and/or Nutrient Delivery: Continue current diet,Continue oral nutrition supplement  Nutrition Education and Counseling: No recommendations at this time  Coordination of Nutrition Care: Continue to monitor while inpatient    Goals:  PO intake >50% meals and supplements next 3-5 days       Nutrition Monitoring and Evaluation:   Behavioral-Environmental Outcomes: None identified  Food/Nutrient Intake Outcomes: Food and nutrient intake,Supplement intake  Physical Signs/Symptoms Outcomes: Biochemical data,GI status,Weight,Skin    Discharge Planning:    Continue current diet,Continue oral nutrition supplement     Electronically signed by Roberto Carlos Ybarra RD on 1/6/2022 at 3:45 PM    Contact: [phone]

## 2022-01-06 NOTE — PROGRESS NOTES
Transition of Care Plan:    RUR: 27%   Disposition: IPR-[facility] or [facility] (pending acceptance)  Follow up appointments: Follow up with PCP and/or Specialist   DME needed: N/A  Transportation at Discharge: Family to assist   [place] or means to access home: Family will provide        IM Medicare Letter: 2nd IM Medicare Letter to be given   Is patient a BCPI-A Bundle:   N/A       If yes, was Bundle Letter given?:    Is patient a [place] and connected with the [place]? N/A  If yes, was [place] transfer form completed and [facility] notified? Caregiver Contact: Dana Flores (sister) [phone] or Delmi Yates (family member) [phone]  Discharge Caregiver contacted prior to discharge? Family to be contacted    UPDATE: 1:09PM    CM informed from liaison at [facility], via telephone that pt will require PCR covid test with results, prior to admission. [facility] liaison reported that facility doesn't have current beds available on today, but will have beds on 1/7/22    INITIAL NOTE: CM received updated therapy notes in regards to pt, and he is being recommended for IPR at the time of d/c. Pt is known to be a current dialysis pt, and is asymptomatic of covid. CM sent referrals to [facility] (1st choice) and [facility] (2nd choice), to review. Pt does not require insurance auth at the time of d/c.  CM informed both facilities that pt will require dialysis at the time of admission. Both facilities understand that pt is asymptomatic due to covid-19. Pt will require to be quarantined for 20 days at [facility], with no visitors, and [facility] for 14 days, no visitors. CM informed that [facility] will possibly have no beds on today, but will have available beds on 1/7/22. CM will verify bed availability with [facility]. CM will encourage pt's family to assist with transport once accepted, due to transport delays. CM will enter delay for d/c barrier. CM will continue to follow.     DARREL Chicas, CM

## 2022-01-06 NOTE — PROGRESS NOTES
Hospitalist Progress Note    NAME: Mari Blevins   :  1975   MRN:  [id number]       Assessment / Plan:  Acute blood loss anemia secondary to gastric ulcer  Status post transfusion    Ventricular tachyarrhythmia  Cardiomyopathy  Hemorrhagic shock  Extensive right lower extremity deep venous thrombosis status post thrombectomy and IVC filter placement  Patient is continue with dialysis  SNF placement       / Body mass index is 22.55 kg/m². Estimated discharge date: 2022  Barriers: Insurance authorization    Code status: Full  Prophylaxis: SCDs patient has IVC filter  Recommended Disposition: SNF     Subjective:     Chief Complaint / Reason for Physician Visit  . Discussed with RN events overnight. Patient seen and examined at bedside comfortable no fever no chills no nausea no vomiting. Scheduled dialysis    Review of Systems:  Symptom Y/N Comments  Symptom Y/N Comments   Fever/Chills    Chest Pain     Poor Appetite    Edema     Cough    Abdominal Pain     Sputum    Joint Pain     SOB/ZAMUDIO    Pruritis/Rash     Nausea/vomit    Tolerating PT/OT     Diarrhea    Tolerating Diet     Constipation    Other       Could NOT obtain due to:      Objective:     VITALS:   Last 24hrs VS reviewed since prior progress note.  Most recent are:  Patient Vitals for the past 24 hrs:   Temp Pulse Resp BP SpO2   22 0921 98.2 °F (36.8 °C) (!) 106 19 130/75 94 %   22 2347 100 °F (37.8 °C) (!) 115 18 121/74 90 %   22 1928 100.1 °F (37.8 °C) (!) 107 18 103/63 90 %   22 1445 -- 89 16 126/72 --   22 1430 -- (!) 101 16 117/68 --   22 1415 -- (!) 103 16 122/81 --   22 1400 -- (!) 103 17 135/71 --   22 1345 -- (!) 101 18 122/74 96 %   22 1330 -- 93 17 117/62 --   22 1315 -- (!) 106 18 124/74 --   22 1300 -- (!) 106 20 126/73 96 %     No intake or output data in the 24 hours ending 22 1245     I had a face to face encounter and independently examined this patient on 1/6/2022, as outlined below:  PHYSICAL EXAM:  General: WD, WN. Alert, cooperative, no acute distress    EENT:  EOMI. Anicteric sclerae. MMM  Resp:  CTA bilaterally, no wheezing or rales. No accessory muscle use  CV:  Regular  rhythm,  No edema  GI:  Soft, Non distended, Non tender. +Bowel sounds  Neurologic:  Alert and oriented X 3, normal speech,   Psych:   Good insight. Not anxious nor agitated  Skin:  No rashes. No jaundice    Reviewed most current lab test results and cultures  YES  Reviewed most current radiology test results   YES  Review and summation of old records today    NO  Reviewed patient's current orders and MAR    YES  PMH/SH reviewed - no change compared to H&P  ________________________________________________________________________  Care Plan discussed with:    Comments   Patient     Family      RN     Care Manager     Consultant                        Multidiciplinary team rounds were held today with , nursing, pharmacist and clinical coordinator. Patient's plan of care was discussed; medications were reviewed and discharge planning was addressed. ________________________________________________________________________  Total NON critical care TIME:     Total CRITICAL CARE TIME Spent:   Minutes non procedure based      Comments   >50% of visit spent in counseling and coordination of care     ________________________________________________________________________  Maury Morgan MD     Procedures: see electronic medical records for all procedures/Xrays and details which were not copied into this note but were reviewed prior to creation of Plan. LABS:  I reviewed today's most current labs and imaging studies.   Pertinent labs include:  Recent Labs     01/05/22  1119 01/03/22  1554   WBC 14.1* 12.1*   HGB 8.1* 8.9*   HCT 25.1* 27.4*   * 115*     Recent Labs     01/05/22  1119 01/03/22  1554     --    K 3.6  --    CL 98  --    CO2 30  --    *  -- BUN 41*  --    CREA 6.01*  --    CA 7.8*  --    MG  --  2.3   PHOS 3.2 4.2       Signed: Fuentes Batista MD

## 2022-01-06 NOTE — PROGRESS NOTES
Nephrology Progress Note  Blaise Leblanc     [facility]. [email or web address]  Phone - [phone]   Patient: Ermelinda Landrum    YOB: 1975        Date- 1/6/2022   Admit Date: 12/26/2021  CC: Follow up for esrd          IMPRESSION & PLAN:   · ESRD - home HD 5 days per week- Follows up with Dr Daren Mccauley at [facility]  · Covid 19 infection  · Right leg ileofemoral DVT -s/p venous thrombectomy and IVC filter placement 1/4  · H/o esophageal bleed from esophagitis  · Left arm swelling- s/p angioplasty of left subclavian vein  · Gram positive sepsis from dental abcess  · s/p lead extraction at [facility]  · Anemia of ckd  · Hx of renal tx in past times 2.  · Hypertension  · CAD  · Failed RTX times 2  · H/o DVT, s/p ivc filter/ h/o HIT      PLAN-   No need for HD today   Continue epogen   Appreciate vas. Surgery input   Plan for Possible DC today     Subjective: Interval History:   -Feels better  -Interval events noted          Objective:   Vitals:    01/05/22 1445 01/05/22 1928 01/05/22 2347 01/06/22 0921   BP: 126/72 103/63 121/74 130/75   Pulse: 89 (!) 107 (!) 115 (!) 106   Resp: 16 18 18 19   Temp:  100.1 °F (37.8 °C) 100 °F (37.8 °C) 98.2 °F (36.8 °C)   TempSrc:       SpO2:  90% 90% 94%   Weight:       Height:          No intake/output data recorded. Last 3 Recorded Weights in this Encounter    12/27/21 0700 12/28/21 0400 01/04/22 0839   Weight: 65.8 kg (145 lb 1 oz) 65.3 kg (143 lb 15.4 oz) 65.3 kg (143 lb 15.4 oz)      Physical exam:   GEN: nad  NECK- Supple  RESP: no distress  NEURO:non focal    Due to the COVID-19 pandemic, in order to reduce the spread and transmission of the virus, some basic elements of the physical exam have been deferred to reduce direct or close contact with the patient . Chart reviewed. Pertinent Notes reviewed.      Data Review :  Recent Labs     01/05/22  1119 01/03/22  1554     --    K 3.6  --    CL 98  --    CO2 30  --    BUN 41*  -- CREA 6.01*  --    *  --    CA 7.8*  --    MG  --  2.3   PHOS 3.2 4.2     Recent Labs     01/05/22  1119 01/03/22  1554   WBC 14.1* 12.1*   HGB 8.1* 8.9*   HCT 25.1* 27.4*   * 115*     No results for input(s): FE, TIBC, PSAT, FERR in the last 72 hours.    Medication list  reviewed  Current Facility-Administered Medications   Medication Dose Route Frequency    oxyCODONE IR (ROXICODONE) tablet 10 mg  10 mg Oral Q4H PRN    And    acetaminophen (TYLENOL) tablet 325 mg  325 mg Oral Q4H PRN    sucralfate (CARAFATE) tablet 1 g  1 g Oral AC&HS    pantoprazole (PROTONIX) tablet 40 mg  40 mg Oral ACB&D    alum-mag hydroxide-simeth (MYLANTA) oral suspension 30 mL  30 mL Oral Q4H PRN    balsam peru-castor oiL (VENELEX) ointment   Topical BID    oxyCODONE-acetaminophen (PERCOCET) 5-325 mg per tablet 1 Tablet  1 Tablet Oral Q4H PRN    sodium chloride (NS) flush 5-40 mL  5-40 mL IntraVENous Q8H    sodium chloride (NS) flush 5-40 mL  5-40 mL IntraVENous PRN    acetaminophen (TYLENOL) tablet 650 mg  650 mg Oral Q6H PRN    Or    acetaminophen (TYLENOL) suppository 650 mg  650 mg Rectal Q6H PRN    polyethylene glycol (MIRALAX) packet 17 g  17 g Oral DAILY PRN    ondansetron (ZOFRAN) injection 4 mg  4 mg IntraVENous Q6H PRN    alcohol 62% (NOZIN) nasal  1 Ampule  1 Ampule Topical Q12H    epoetin emilie-epbx (RETACRIT) injection 20,000 Units  20,000 Units SubCUTAneous Q MON, WED & FRI          Contreras Cotto MD              [facility]  Formerly [facility], [address]  [place], [address]  Phone - [phone]               Fax - [phone]

## 2022-01-06 NOTE — PROGRESS NOTES
Hospitalist Progress Note    NAME: Rg Phillips   :  1975   MRN:  [id number]       Assessment / Plan:  Acute blood loss anemia secondary to gastric ulcer  Status post blood transfusion  Ventricular tachyarrhythmia  Extensive right lower extremity deep venous thrombosis status post thrombectomy and IVC filter placement  End-stage renal disease continue dialysis  Pain control         / Body mass index is 22.55 kg/m². Estimated discharge date: 2022  Barriers: Insurance authorization bed availability    Code status: Full  Prophylaxis: SCDs status post IVC filter  Recommended Disposition: SNF     Subjective:     Chief Complaint / Reason for Physician Visit    Discussed with RN events overnight. Patient seen and examined at bedside no events overnight. Review of Systems:  Symptom Y/N Comments  Symptom Y/N Comments   Fever/Chills  patient seen and examined at bedside no new complaints doing much better. Chest Pain     Poor Appetite    Edema     Cough    Abdominal Pain     Sputum    Joint Pain     SOB/ZAMUDIO    Pruritis/Rash     Nausea/vomit    Tolerating PT/OT     Diarrhea    Tolerating Diet     Constipation    Other       Could NOT obtain due to:      Objective:     VITALS:   Last 24hrs VS reviewed since prior progress note. Most recent are:  Patient Vitals for the past 24 hrs:   Temp Pulse Resp BP SpO2   22 0921 98.2 °F (36.8 °C) (!) 106 19 130/75 94 %   22 2347 100 °F (37.8 °C) (!) 115 18 121/74 90 %   22 1928 100.1 °F (37.8 °C) (!) 107 18 103/63 90 %   22 1445 -- 89 16 126/72 --   22 1430 -- (!) 101 16 117/68 --   22 1415 -- (!) 103 16 122/81 --     No intake or output data in the 24 hours ending 22 1400     I had a face to face encounter and independently examined this patient on 2022, as outlined below:  PHYSICAL EXAM:  General: WD, WN. Alert, cooperative, no acute distress    EENT:  EOMI. Anicteric sclerae.  MMM  Resp:  CTA bilaterally, no wheezing or rales. No accessory muscle use  CV:  Regular  rhythm,  No edema  GI:  Soft, Non distended, Non tender. +Bowel sounds  Neurologic:  Alert and oriented X 3, normal speech,   Psych:   Good insight. Not anxious nor agitated  Skin:  No rashes. No jaundice    Reviewed most current lab test results and cultures  YES  Reviewed most current radiology test results   YES  Review and summation of old records today    NO  Reviewed patient's current orders and MAR    YES  PMH/SH reviewed - no change compared to H&P  ________________________________________________________________________  Care Plan discussed with:    Comments   Patient     Family      RN     Care Manager     Consultant                        Multidiciplinary team rounds were held today with , nursing, pharmacist and clinical coordinator. Patient's plan of care was discussed; medications were reviewed and discharge planning was addressed. ________________________________________________________________________  Total NON critical care TIME:    Total CRITICAL CARE TIME Spent:   Minutes non procedure based      Comments   >50% of visit spent in counseling and coordination of care     ________________________________________________________________________  Marina Brown MD     Procedures: see electronic medical records for all procedures/Xrays and details which were not copied into this note but were reviewed prior to creation of Plan. LABS:  I reviewed today's most current labs and imaging studies.   Pertinent labs include:  Recent Labs     01/05/22  1119 01/03/22  1554   WBC 14.1* 12.1*   HGB 8.1* 8.9*   HCT 25.1* 27.4*   * 115*     Recent Labs     01/05/22  1119 01/03/22  1554     --    K 3.6  --    CL 98  --    CO2 30  --    *  --    BUN 41*  --    CREA 6.01*  --    CA 7.8*  --    MG  --  2.3   PHOS 3.2 4.2       Signed: Marina Brown MD

## 2022-01-06 NOTE — PROGRESS NOTES
Orders received, chart reviewed and patient evaluated by physical therapy. Pending progression with skilled acute physical therapy, recommend:  Therapy 3 hours per day 5-7 days per week    Recommend with nursing OOB to chair 3x/day and walking daily with 1  person assist and walker. Thank you for completing as able in order to maintain patient strength, endurance and independence. Full evaluation to follow.      Radha Blackwood, PT

## 2022-01-06 NOTE — PROGRESS NOTES
Problem: Self Care Deficits Care Plan (Adult)  Goal: *Acute Goals and Plan of Care (Insert Text)  Description: FUNCTIONAL STATUS PRIOR TO ADMISSION: Patient was modified independent using a Rollator for functional mobility. Pt reports living with sister in upstairs duplex, with tub-shower combo. Pt reports having Rollator. HOME SUPPORT: The patient lived with sister but did not require assist.    Occupational Therapy Goals  Initiated 1/6/2022  1. Patient will perform EOB bathing with minimal assistance within 7 day(s). 2.  Patient will perform EOB/RW lower body dressing with minimal assistance and AE within 7 day(s). 3.  Patient will perform RW grooming with minimal assistance within 7 day(s). 4.  Patient will perform RW toilet transfers with contact guard assist within 7 day(s). 5.  Patient will perform all aspects of RW toileting with contact guard assist within 7 day(s). Outcome: Not Met    OCCUPATIONAL THERAPY EVALUATION  Patient: Timothy Hernandez (66 y.o. male)  Date: 1/6/2022  Primary Diagnosis: GIB (gastrointestinal bleeding) [K92.2]  Procedure(s) (LRB):  RIGHT LEG  VENOUS THROMBECTOMY AND INSERTION OF IVC FILTER (Right) 2 Days Post-Op   Precautions:  Fall,Skin,Contact (droplet Plus)    ASSESSMENT  Based on the objective data described below, the patient presents with high c/o R thigh pain with movement, decreased RLE weight bearing acceptance, decreased distal LE ADL management, decreased strength/endurance, decreased mobility/balance and decreased activity tolerance, all of which limit pt's ability to complete self-care routine at level congruent with PLOF. Currently, pt is Independent with self-feeding, S/U for seated grooming and UB dressing and Max A for LE dressing/bathing/toileting. Pt's functional mobility/balance, limited by c/o R thigh pain; however, pt noted with good engagement with sit to stand and R lateral side steps at RW with Min A x2, with no LOB.   Pt verbalizes good understanding of tub-transfer bench, as well as, modified dressing techniques. Pt benefits from skilled OT to address functional deficits during acute hospitalization, with reporting therapist believing pt would benefit from inpatient rehab upon discharge. Current Level of Function Impacting Discharge (ADLs/self-care): Independent with self-feeding, S/U for seated grooming and UB dressing and Max A for LE dressing/bathing/toileting    Functional Outcome Measure: The patient scored 45/100 on the Barthel Index outcome measure. Other factors to consider for discharge: RLE pain     Patient will benefit from skilled therapy intervention to address the above noted impairments. PLAN :  Recommendations and Planned Interventions: self care training, functional mobility training, therapeutic exercise, balance training, therapeutic activities, endurance activities, and patient education    Frequency/Duration: Patient will be followed by occupational therapy 3 times a week to address goals. Recommendation for discharge: (in order for the patient to meet his/her long term goals)  Therapy 3 hours per day 5-7 days per week    This discharge recommendation:  Has not yet been discussed the attending provider and/or case management    IF patient discharges home will need the following DME: TBD       SUBJECTIVE:   Patient stated it doesn't hurt down low but more so up here (thigh).  re: RLE    OBJECTIVE DATA SUMMARY:   HISTORY:   Past Medical History:   Diagnosis Date    Abdominal hematoma     AICD (automatic cardioverter/defibrillator) present     CAD (coronary artery disease)     anterior MI s/p 2 stents  2007    Chronic abdominal pain     Chronic kidney disease     ESRD; Dialysis dependent.  Home [facility] does labs- [facility] tpke    DVT [facility]deep venous thrombosis) [facility] 2001    DVT of popliteal vein ([facility] 11/2011    not anticoagulated due to bleeding, IVC filter    Endocarditis     Gallstone pancreatitis     Gastrointestinal disorder     acid reflux    Gastrointestinal disorder     peptic ulcer    Hemodialysis patient ([facility]     High cholesterol     Hypertension     Kidney transplant     b/l kidneys 1995, 2001    Long term current use of anticoagulant therapy     Nephrotic syndrome     Other ill-defined conditions(799.89)     kidny transplant x2,  on dialysis    Other ill-defined conditions(799.89)     home dialysis    Other ill-defined conditions(799.89)     hx recurrent left leg DVT    Peritonitis ([facility]     Seizures ([facility] 2015    most recent- only had three in lifetime    Small bowel obstruction (HCC)     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[facility]     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    V-tach ([facility]      Past Surgical History:   Procedure Laterality Date    HX APPENDECTOMY      HX CHOLECYSTECTOMY      HX OTHER SURGICAL  11/2011    exploratory laparotomy    HX OTHER SURGICAL      fem-pop    HX OTHER SURGICAL  02/2013    right upper extremity fistula    HX PACEMAKER Left 6/2009    AICD-st latonya-not connected    HX PACEMAKER Left     AICD-left side-BS-working    HX SMALL BOWEL RESECTION      HX TRANSPLANT  2001     Kidney    HX TRANSPLANT  1992    kidney    HX VASCULAR ACCESS Right     femoral access for HD- does 5 day dialysis @ home    IR INSERT NON TUNL CVC OVER 5 YRS  12/7/2021    IR INSERT NON TUNL CVC OVER 5 YRS  12/10/2021    IR INSERT TIPS HEPATIC SHUNT  12/30/2021    IR REMOVE TUNL CVAD W/O PORT / PUMP  10/29/2020    IR REPLACE CVC TUNNELED W/O PORT  9/10/2020    UT CARDIAC SURG PROCEDURE UNLIST  2007    2 stents    UT EDG US EXAM SURGICAL ALTER STOM DUODENUM/JEJUNUM  7/15/2011         UT ESOPHAGOGASTRODUODENOSCOPY TRANSORAL DIAGNOSTIC  5/24/2012         UPPER GI ENDOSCOPY,CTRL BLEED  12/6/2021         UPPER GI ENDOSCOPY,DIAGNOSIS  12/8/2021         VASCULAR SURGERY PROCEDURE UNLIST  11/14/2017    Insertion AV graft right upper arm (bovine); ligation of AV fistula right arm Expanded or extensive additional review of patient history:     Home Situation  Home Environment: Private residence (Duplex-resides upstairs)  # Steps to Enter: 15  Rails to Enter: Yes  Hand Rails : Right  One/Two Story Residence: One story (2nd floor of duplex)  Living Alone: No  Support Systems: Other Family Member(s) (sister)  Patient Expects to be Discharged to[de-identified] Rehabilitation facility  Current DME Used/Available at Home: Angelita Tato, rollator  Tub or Shower Type: Tub/Shower combination    Hand dominance: Right    EXAMINATION OF PERFORMANCE DEFICITS:  Cognitive/Behavioral Status:  Neurologic State: Alert  Orientation Level: Oriented X4  Cognition: Appropriate decision making; Appropriate for age attention/concentration; Appropriate safety awareness  Perception: Appears intact  Perseveration: No perseveration noted  Safety/Judgement: Awareness of environment;Home safety; Insight into deficits    Hearing: Auditory  Auditory Impairment: None  Hearing Aids/Status: Does not own    Vision/Perceptual:                           Acuity: Within Defined Limits         Range of Motion:  AROM: Generally decreased, functional (RLE; BUE WFL)  PROM: Generally decreased, functional (RLE; BUE WFL)                      Strength:  Strength: Generally decreased, functional (RLE; BUE WFL)                Coordination:  Coordination: Generally decreased, functional (RLE; BUE WFL)  Fine Motor Skills-Upper: Left Intact; Right Intact    Gross Motor Skills-Upper: Left Intact; Right Intact    Tone & Sensation:  Tone: Normal  Sensation: Impaired (hypersensitivity to R thigh)                      Balance:  Sitting: Intact  Standing: Impaired; With support  Standing - Static: Good;Fair;Constant support  Standing - Dynamic : Fair;Constant support    Functional Mobility and Transfers for ADLs:  Bed Mobility:  Rolling: Minimum assistance  Supine to Sit: Maximum assistance (secondary to RLE pain)    Transfers:  Sit to Stand: Minimum assistance;Assist x2  Stand to Sit: Minimum assistance;Assist x2    ADL Assessment:  Feeding: Independent    Oral Facial Hygiene/Grooming: Setup    Bathing: Maximum assistance    Upper Body Dressing: Setup    Lower Body Dressing: Maximum assistance    Toileting: Maximum assistance    ADL Intervention and task modifications:  Pt educated on safe transfer techniques, with specific emphasis on proper hand placement to push up from seated surface rather than attempt to pull self up, fully positioning self in-front of desired seated location, feeling chair on back of legs and reaching back with 1-2 UE to slowly lower self to seated position. Patient instructed and indicated understanding the benefits of maintaining activity tolerance, functional mobility, and independence with self care tasks during acute stay  to ensure safe return home and to baseline. Encouraged patient to increase frequency and duration OOB, be out of bed for all meals, perform daily ADLs (as approved by RN/MD regarding bathing etc), and performing functional mobility to/from bathroom. =    Cognitive Retraining  Safety/Judgement: Awareness of environment;Home safety; Insight into deficits    Functional Measure:    Barthel Index:  Bathin  Bladder: 5  Bowels: 5  Groomin  Dressin  Feeding: 10  Mobility: 0  Stairs: 0  Toilet Use: 5  Transfer (Bed to Chair and Back): 10  Total: 45/100      The Barthel ADL Index: Guidelines  1. The index should be used as a record of what a patient does, not as a record of what a patient could do. 2. The main aim is to establish degree of independence from any help, physical or verbal, however minor and for whatever reason. 3. The need for supervision renders the patient not independent. 4. A patient's performance should be established using the best available evidence. Asking the patient, friends/relatives and nurses are the usual sources, but direct observation and common sense are also important.  However direct testing is not needed. 5. Usually the patient's performance over the preceding 24-48 hours is important, but occasionally longer periods will be relevant. 6. Middle categories imply that the patient supplies over 50 per cent of the effort. 7. Use of aids to be independent is allowed. Score Interpretation (from [address])    Independent   60-79 Minimally independent   40-59 Partially dependent   20-39 Very dependent   <20 Totally dependent     -Edyta Harden., Barthel, DFADY. (1965). Functional evaluation: the Barthel Index. [address] ([address] (1997). The Barthel activities of daily living index: self-reporting versus actual performance in the old (> or = 75 years). Journal [address] 45(7), 14 [facility], LEO, Kay Sutherland., Susan Mack. (1999). Measuring the change in disability after inpatient rehabilitation; comparison of the responsiveness of the Barthel Index and Functional [place] Measure. Journal of Neurology, Neurosurgery, and Psychiatry, 66(4), 419-838. Bernardo Higginbotham, N.J.A, GAMAL Payton, & Jerica Ramos, M.A. (2004) Assessment of post-stroke quality of life in cost-effectiveness studies: The usefulness of the Barthel Index and the EuroQoL-5D.  Quality of Life Research, 15, 202-92        Occupational Therapy Evaluation Charge Determination   History Examination Decision-Making   LOW Complexity : Brief history review  MEDIUM Complexity : 3-5 performance deficits relating to physical, cognitive , or psychosocial skils that result in activity limitations and / or participation restrictions LOW Complexity : No comorbidities that affect functional and no verbal or physical assistance needed to complete eval tasks       Based on the above components, the patient evaluation is determined to be of the following complexity level: LOW   Pain Rating:  High c/o R thigh pain, did not quantify; RN aware and following    Activity Tolerance:   Fair and requires rest breaks    After treatment patient left in no apparent distress:    Call bell within reach and sitting EOB with RN aware and following    COMMUNICATION/EDUCATION:   The patients plan of care was discussed with: Physical therapist and Registered nurse. Home safety education was provided and the patient/caregiver indicated understanding., Patient/family have participated as able in goal setting and plan of care. , and Patient/family agree to work toward stated goals and plan of care. This patients plan of care is appropriate for delegation to [facility].     Thank you for this referral.  Tasha Baptiste OT  Time Calculation: 33 mins

## 2022-01-06 NOTE — PROGRESS NOTES
Pharmacist Discharge Medication Reconciliation    Significant PMH:   Past Medical History:   Diagnosis Date    Abdominal hematoma     AICD (automatic cardioverter/defibrillator) present     CAD (coronary artery disease)     anterior MI s/p 2 stents  2007    Chronic abdominal pain     Chronic kidney disease     ESRD; Dialysis dependent. Home [facility] does labs- [facility] tpke    DVT (deep venous thrombosis) ([facility] 2001    DVT of popliteal vein ([facility] 11/2011    not anticoagulated due to bleeding, IVC filter    Endocarditis     Gallstone pancreatitis     Gastrointestinal disorder     acid reflux    Gastrointestinal disorder     peptic ulcer    Hemodialysis patient ([facility]     High cholesterol     Hypertension     Kidney transplant     b/l kidneys 1995, 2001    Long term current use of anticoagulant therapy     Nephrotic syndrome     Other ill-defined conditions(799.89)     kidny transplant x2,  on dialysis    Other ill-defined conditions(799.89)     home dialysis    Other ill-defined conditions(799.89)     hx recurrent left leg DVT    Peritonitis ([facility]     Seizures ([facility] 2015    most recent- only had three in lifetime    Small bowel obstruction (HCC)     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 Acute upper GI bleed Yes    Anemia, unspecified type     ESRD (end stage renal disease) on dialysis (HCC)     Hypotension due to hypovolemia     Arterial occlusion      Allergies: Shellfish containing products, Contrast agent [iodine], and Heparin analogues    Discharge Medications:   Current Discharge Medication List        START taking these medications    Details   sucralfate (CARAFATE) 1 gram tablet Take 1 Tablet by mouth Before breakfast, lunch, dinner and at bedtime. Qty: 120 Tablet, Refills: 2  Start date: 1/5/2022      polyethylene glycol (MIRALAX) 17 gram packet Take 1 Packet by mouth daily.   Qty: 30 Packet, Refills: 0  Start date: 1/5/2022      oxyCODONE IR (ROXICODONE) 10 mg tab immediate release tablet Take 1 Tablet by mouth every four (4) hours as needed for Pain for up to 3 days. Max Daily Amount: 60 mg.  Qty: 18 Tablet, Refills: 0  Start date: 1/5/2022, End date: 1/8/2022    Associated Diagnoses: Arterial occlusion           CONTINUE these medications which have CHANGED    Details   pantoprazole (PROTONIX) 40 mg tablet Take 1 Tablet by mouth Before breakfast and dinner. Qty: 60 Tablet, Refills: 2  Start date: 1/5/2022           CONTINUE these medications which have NOT CHANGED    Details   propranoloL (INDERAL) 10 mg tablet Take 10 mg by mouth two (2) times a day. Vitamin D2 1,250 mcg (50,000 unit) capsule TAKE 1 CAPSULE BY MOUTH THREE  DAYS (TIMES) A WEEK      metoclopramide HCl (REGLAN) 5 mg tablet Take 1 Tablet by mouth two (2) times a day for 30 days. Qty: 60 Tablet, Refills: 0      ondansetron (Zofran ODT) 4 mg disintegrating tablet 1 Tab by SubLINGual route every eight (8) hours as needed for Nausea or Vomiting. Qty: 20 Tab, Refills: 0      calcium acetate,phosphat bind, (PHOSLO) 667 mg cap Take 2 Caps by mouth three (3) times daily (with meals). Indications: low amount of calcium in the blood, renal osteodystrophy with hyperphosphatemia  Qty: 120 Cap, Refills: 0      atorvastatin (Lipitor) 20 mg tablet Take 20 mg by mouth daily. acetaminophen (TYLENOL) 500 mg tablet Take 1 Tab by mouth every four (4) hours as needed for Pain. Over the counter  Qty: 30 Tab, Refills: 0      calcitRIOL (ROCALTROL) 0.5 mcg capsule Take 0.5 mcg by mouth daily. STOP taking these medications       oxyCODONE-acetaminophen (PERCOCET) 5-325 mg per tablet Comments:   Reason for Stopping:               The patient's chart, MAR and AVS were reviewed by Zachary Bacon, [address] [place] [address].     Discharging Provider: Katia Cisneros MD    Thank you,     Zachary Bacon, [address] [place] [address]

## 2022-01-07 LAB
ALBUMIN SERPL-MCNC: 1.8 G/DL (ref 3.5–5)
ANION GAP SERPL CALC-SCNC: 7 MMOL/L (ref 5–15)
BUN SERPL-MCNC: 49 MG/DL (ref 6–20)
BUN/CREAT SERPL: 8 (ref 12–20)
CALCIUM SERPL-MCNC: 7.8 MG/DL (ref 8.5–10.1)
CHLORIDE SERPL-SCNC: 96 MMOL/L (ref 97–108)
CO2 SERPL-SCNC: 29 MMOL/L (ref 21–32)
CREAT SERPL-MCNC: 6.26 MG/DL (ref 0.7–1.3)
ERYTHROCYTE [DISTWIDTH] IN BLOOD BY AUTOMATED COUNT: 16.2 % (ref 11.5–14.5)
GLUCOSE SERPL-MCNC: 98 MG/DL (ref 65–100)
HCT VFR BLD AUTO: 24.7 % (ref 36.6–50.3)
HGB BLD-MCNC: 8 G/DL (ref 12.1–17)
MCH RBC QN AUTO: 29.9 PG (ref 26–34)
MCHC RBC AUTO-ENTMCNC: 32.4 G/DL (ref 30–36.5)
MCV RBC AUTO: 92.2 FL (ref 80–99)
NRBC # BLD: 0 K/UL (ref 0–0.01)
NRBC BLD-RTO: 0 PER 100 WBC
PHOSPHATE SERPL-MCNC: 3.2 MG/DL (ref 2.6–4.7)
PLATELET # BLD AUTO: 163 K/UL (ref 150–400)
PMV BLD AUTO: 12.6 FL (ref 8.9–12.9)
POTASSIUM SERPL-SCNC: 4.1 MMOL/L (ref 3.5–5.1)
RBC # BLD AUTO: 2.68 M/UL (ref 4.1–5.7)
SODIUM SERPL-SCNC: 132 MMOL/L (ref 136–145)
WBC # BLD AUTO: 16.6 K/UL (ref 4.1–11.1)

## 2022-01-07 PROCEDURE — 85027 COMPLETE CBC AUTOMATED: CPT

## 2022-01-07 PROCEDURE — 74011250637 HC RX REV CODE- 250/637: Performed by: NURSE PRACTITIONER

## 2022-01-07 PROCEDURE — 80069 RENAL FUNCTION PANEL: CPT

## 2022-01-07 PROCEDURE — 87340 HEPATITIS B SURFACE AG IA: CPT

## 2022-01-07 PROCEDURE — 36415 COLL VENOUS BLD VENIPUNCTURE: CPT

## 2022-01-07 PROCEDURE — 74011250637 HC RX REV CODE- 250/637: Performed by: SURGERY

## 2022-01-07 PROCEDURE — 86706 HEP B SURFACE ANTIBODY: CPT

## 2022-01-07 PROCEDURE — 65270000015 HC RM PRIVATE ONCOLOGY

## 2022-01-07 PROCEDURE — 80074 ACUTE HEPATITIS PANEL: CPT

## 2022-01-07 PROCEDURE — 90935 HEMODIALYSIS ONE EVALUATION: CPT

## 2022-01-07 PROCEDURE — 86705 HEP B CORE ANTIBODY IGM: CPT

## 2022-01-07 PROCEDURE — 86803 HEPATITIS C AB TEST: CPT

## 2022-01-07 PROCEDURE — 74011000250 HC RX REV CODE- 250: Performed by: SURGERY

## 2022-01-07 PROCEDURE — 74011250636 HC RX REV CODE- 250/636: Performed by: SURGERY

## 2022-01-07 RX ADMIN — ALUMINUM HYDROXIDE, MAGNESIUM HYDROXIDE, AND SIMETHICONE 30 ML: 200; 200; 20 SUSPENSION ORAL at 12:12

## 2022-01-07 RX ADMIN — ALUMINUM HYDROXIDE, MAGNESIUM HYDROXIDE, AND SIMETHICONE 30 ML: 200; 200; 20 SUSPENSION ORAL at 06:15

## 2022-01-07 RX ADMIN — Medication: at 09:00

## 2022-01-07 RX ADMIN — OXYCODONE 10 MG: 5 TABLET ORAL at 03:00

## 2022-01-07 RX ADMIN — SUCRALFATE 1 G: 1 TABLET ORAL at 08:04

## 2022-01-07 RX ADMIN — Medication: at 18:00

## 2022-01-07 RX ADMIN — EPOETIN ALFA-EPBX 20000 UNITS: 20000 INJECTION, SOLUTION INTRAVENOUS; SUBCUTANEOUS at 22:12

## 2022-01-07 RX ADMIN — PANTOPRAZOLE SODIUM 40 MG: 40 TABLET, DELAYED RELEASE ORAL at 08:04

## 2022-01-07 RX ADMIN — OXYCODONE 10 MG: 5 TABLET ORAL at 12:11

## 2022-01-07 RX ADMIN — SUCRALFATE 1 G: 1 TABLET ORAL at 12:12

## 2022-01-07 RX ADMIN — SODIUM CHLORIDE, PRESERVATIVE FREE 10 ML: 5 INJECTION INTRAVENOUS at 14:00

## 2022-01-07 RX ADMIN — Medication 1 AMPULE: at 22:10

## 2022-01-07 RX ADMIN — OXYCODONE 10 MG: 5 TABLET ORAL at 08:03

## 2022-01-07 RX ADMIN — SUCRALFATE 1 G: 1 TABLET ORAL at 22:15

## 2022-01-07 RX ADMIN — OXYCODONE 10 MG: 5 TABLET ORAL at 17:01

## 2022-01-07 RX ADMIN — Medication 1 AMPULE: at 08:06

## 2022-01-07 NOTE — PROCEDURES
01/07/22 1700   During Hemodialysis    Temp 97.4 °F (36.3 °C)   Temp source Oral   Pulse (Heart Rate) 93   Resp Rate 16   BP (!) 106/56   MAP (Calculated) 73   Transducer Checks Dry   Saline Given (mL) 200 mL   Heparin Bolus (units) 0 units   Continuous Heparin Infusion (Units/hr) 0 Units/hr   Blood Flow Rate (ml/min) 400 ml/min   Dialysate Flow Rate (ml/hr) 600 ml/hr   Arterial Access Pressure (mmHg) -100   Venous Return Pressure (mmHg) 180   Transmembrane Pressure (mmHg) 40 mmHg   Ultrafiltration Rate (ml/hr) 730 ml/hr   Fluid Removed (mL) 0   $$ Dialysis Charges   $$ Method Hemodialysis   NO TELEMETRY MONITORING    01/07/22 2022   During Hemodialysis    Temp 99.1 °F (37.3 °C)   Pulse (Heart Rate) (!) 108   /74   MAP (Calculated) 91   Fluid Removed (mL) 2500   NET Fluid Removed (mL) 2000 ml   Post-Dialysis   Rinseback Volume (ml) 200 ml   Duration of Treatment (hours) 3.5 hours   Patient Response to Treatment Well   Patient Disposition (REMAINED IN ROOM)   Condition of Dialyzer Filter Poor   Hemodialysis End Time 2022     Assessment Pre Post   LOC A/OX4 NO CHANGE   Lungs BLCTA NO CHANGE   Cardiac RRR, WNL NO CHANGE   Skin W/D/I, FLAKING NO CHANGE   Edema BLE +3-4 NO CHANGE   Pain Pain Intensity 1: (P) 0 (01/07/22 1300) 0     Orders   Duration: Start: 1700 End: 2022 Total: 3.5   Dialyzer: Dialyzer/Set Up Inspection: Revaclear (01/07/22 1700)   K Bath: Dialysate K (mEq/L): 2 (01/07/22 1700)   Ca Bath: Dialysate CA (mEq/L): 3.0 (01/07/22 1700)   Na: Dialysate NA (mEq/L): 138 (01/07/22 1700)   Bicarb: Dialysate HCO3 (mEq/L): 40 (01/07/22 1700)   Target Fluid Removal: Goal/Amount of Fluid to Remove (mL): 2000 mL (01/07/22 1700)     Access   Type & Location: LUE AVG   Comments: +BRUIT/THRILL, NOTED STITCH W/ ORANGE MARKER LEFT IN PLACE FROM PREVIOUS PROCEDURE, NO S/S INFECTION/COMPLICATION TO AVG. ACCESSED W. 2 15G 1IN NEEDLES W/O ISSUES, GOOD FLOW TO EACH LIMB.                                        Labs Lab Results   Component Value Date/Time    Hepatitis A, IgM NONREACTIVE 12/01/2021 04:40 AM    Hepatitis B surface Ag 0.50 12/01/2021 04:40 AM    Hepatitis B surface Ab 904.40 11/11/2021 03:15 PM    Hepatitis B core, IgM NONREACTIVE 12/01/2021 04:40 AM    Hep B surface Ag screen Negative 09/16/2014 06:00 PM    Hep B Core Ab, IgM Negative 09/16/2014 06:00 PM    Hepatitis A Ab, IgM Negative 09/16/2014 06:00 PM    Hep C Virus Ab 0.1 09/16/2014 06:00 PM      Obtained/Reviewed  Critical Results Called HGB   Date Value Ref Range Status   01/07/2022 8.0 (L) 12.1 - 17.0 g/dL Final     Potassium   Date Value Ref Range Status   01/07/2022 4.1 3.5 - 5.1 mmol/L Final     Calcium   Date Value Ref Range Status   01/07/2022 7.8 (L) 8.5 - 10.1 MG/DL Final     BUN   Date Value Ref Range Status   01/07/2022 49 (H) 6 - 20 MG/DL Final     Creatinine   Date Value Ref Range Status   01/07/2022 6.26 (H) 0.70 - 1.30 MG/DL Final        Meds Given          N/A     Adequacy / Fluid    Total Liters Process: 78.6      Comments   Time Out Done:   (Time) 1652   Admitting Diagnosis: GIB   Consent obtained/signed: Informed Consent Verified: Yes (01/07/22 1700)   Machine / RO # Machine Number: [id number] (01/07/22 1700)   Primary Nurse Rpt Pre: CALEB CARDENAS RN   Primary Nurse Rpt Post:    Pt Education: ACCESS CARE, FLUID MANAGEMENT, S/S INFECTION     Tx Summary   Comments:      @1700 TX STARTED W/O ISSUES, GOOD FLOW TO EACH LINE  @1800 PT RESTING COMFORTABLY, NAD   @1900 RESTING, NAD  @2000 RESTING W/ EYES CLOSED, NAD  @2022 TX COMPLETED W/O ISSUES, BLOOD RETURNED, LINES FLUSHED, NEEDLES REMOVED 1 @ TIME, MANUAL PRESSURE APPLIED TO EACH SITE UNTIL HEMOSTASIS ACHIEVED, ADHESIVE BANDAGE TO EACH SITE, NO S/S ACTIVE BLEEDING UPON HD RN LEAVING PT ROOM, BED IN LOW AND CALL BELL W/I REACH.

## 2022-01-07 NOTE — PROGRESS NOTES
End of Shift Note    Bedside shift change report given to Amor Jones  (oncoming nurse) by Bogdan Ledbetter (offgoing nurse). Report included the following information SBAR, Kardex, Procedure Summary, Intake/Output, MAR and Recent Results    Shift worked:  7p-7a     Shift summary and any significant changes:     patient rested fairly this night sleeping at short intervals. Pain medication given when requested. He ambulated to bathroom with walker x2 and right groin dressing changed X1     Concerns for physician to address:  rapid covid test positive, PCR obtained           Activity:  Activity Level: Up with Assistance  Number times ambulated in hallways past shift: 0  Number of times OOB to chair past shift: 2    Cardiac:   Cardiac Monitoring: No      Cardiac Rhythm: Sinus Rhythm    Access:   Current line(s): PIV     Genitourinary:   Urinary status: anuric    Respiratory:   O2 Device: None (Room air)  Chronic home O2 use?: NO  Incentive spirometer at bedside: NO     GI:  Last Bowel Movement Date: 01/06/22  Current diet:  ADULT DIET Regular; Low Potassium (Less than 3000 mg/day); Low Phosphorus (Less than 1000 mg)  ADULT ORAL NUTRITION SUPPLEMENT Lunch; Clear Liquid  ADULT ORAL NUTRITION SUPPLEMENT Breakfast, Dinner; Renal Supplement  Passing flatus: YES  Tolerating current diet: YES       Pain Management:   Patient states pain is manageable on current regimen: YES    Skin:  Yong Score: 19  Interventions: float heels, increase time out of bed, foam dressing, PT/OT consult and nutritional support     Patient Safety:  Fall Score:  Total Score: 5  Interventions: assistive device (walker, cane, etc), gripper socks and pt to call before getting OOB  High Fall Risk: Yes    Length of Stay:  Expected LOS: 4d 9h  Actual LOS: [address]

## 2022-01-07 NOTE — PROGRESS NOTES
Nephrology Progress Note  Blaise Leblanc     [facility]. [email or web address]  Phone - [phone]   Patient: Letty Main    YOB: 1975        Date- 1/7/2022   Admit Date: 12/26/2021  CC: Follow up for esrd          IMPRESSION & PLAN:   · ESRD - home HD 5 days per week- Follows up [facility] Rhonda Hu [facility]  · Covid 19 infection  · Right leg ileofemoral DVT -s/p venous thrombectomy and IVC filter placement 1/4  · H/o esophageal bleed from esophagitis  · Left arm swelling- s/p angioplasty of left subclavian vein  · Gram positive sepsis from dental abcess  · s/p lead extraction at [facility]  · Anemia of ckd  · Hx of renal tx in past times 2.  · Hypertension  · CAD  · Failed RTX times 2  · H/o DVT, s/p ivc filter/ h/o HIT      PLAN-   Hd today   Draw hepatitis panel   Continue epogen     Subjective: Interval History:   bp stable  No sob  No labs done today        Objective:   Vitals:    01/06/22 1613 01/06/22 1937 01/06/22 2340 01/07/22 0745   BP: 109/71 117/83 114/72 117/71   Pulse: (!) 111 (!) 110 (!) 102 82   Resp: 19 18 18 18   Temp: 98.3 °F (36.8 °C) 98.4 °F (36.9 °C) 99 °F (37.2 °C) 98.7 °F (37.1 °C)   TempSrc:       SpO2: 97% 99% 97% 96%   Weight:       Height:          No intake/output data recorded. Last 3 Recorded Weights in this Encounter    12/27/21 0700 12/28/21 0400 01/04/22 0839   Weight: 65.8 kg (145 lb 1 oz) 65.3 kg (143 lb 15.4 oz) 65.3 kg (143 lb 15.4 oz)      Physical exam:   ,  GEN: NAD  NECK- Supple  RESP: no distress  NEURO:non focal      Due to the COVID-19 pandemic, in order to reduce the spread and transmission of the virus, some basic elements of the physical exam have been deferred to reduce direct or close contact with the patient . Chart reviewed. Pertinent Notes reviewed.      Data Review :  Recent Labs     01/05/22  1119      K 3.6   CL 98   CO2 30   BUN 41*   CREA 6.01*   *   CA 7.8*   PHOS 3.2     Recent Labs 01/05/22  1119   WBC 14.1*   HGB 8.1*   HCT 25.1*   *     No results for input(s): FE, TIBC, PSAT, FERR in the last 72 hours.    Medication list  reviewed  Current Facility-Administered Medications   Medication Dose Route Frequency    oxyCODONE IR (ROXICODONE) tablet 10 mg  10 mg Oral Q4H PRN    And    acetaminophen (TYLENOL) tablet 325 mg  325 mg Oral Q4H PRN    sucralfate (CARAFATE) tablet 1 g  1 g Oral AC&HS    pantoprazole (PROTONIX) tablet 40 mg  40 mg Oral ACB&D    alum-mag hydroxide-simeth (MYLANTA) oral suspension 30 mL  30 mL Oral Q4H PRN    balsam peru-castor oiL (VENELEX) ointment   Topical BID    oxyCODONE-acetaminophen (PERCOCET) 5-325 mg per tablet 1 Tablet  1 Tablet Oral Q4H PRN    sodium chloride (NS) flush 5-40 mL  5-40 mL IntraVENous Q8H    sodium chloride (NS) flush 5-40 mL  5-40 mL IntraVENous PRN    acetaminophen (TYLENOL) tablet 650 mg  650 mg Oral Q6H PRN    Or    acetaminophen (TYLENOL) suppository 650 mg  650 mg Rectal Q6H PRN    polyethylene glycol (MIRALAX) packet 17 g  17 g Oral DAILY PRN    ondansetron (ZOFRAN) injection 4 mg  4 mg IntraVENous Q6H PRN    alcohol 62% (NOZIN) nasal  1 Ampule  1 Ampule Topical Q12H    epoetin emilie-epbx (RETACRIT) injection 20,000 Units  20,000 Units SubCUTAneous Q MON, WED & FRI          Anton Barros MD              [address] [facility]  [facility], [address]  [place], [address]  Phone - [phone]               Fax - [phone]

## 2022-01-07 NOTE — PROGRESS NOTES
Transition of Care Plan:    RUR: 27%   Disposition: IPR-[facility] (accepted)  Follow up appointments: Follow up with PCP and/or Specialist   DME needed: N/A  Transportation at Discharge: Family to assist   [place] or means to access home: Family will provide        IM Medicare Letter: 2nd IM Medicare Letter to be given   Is patient a BCPI-A Bundle:   N/A       If yes, was Bundle Letter given?:    Is patient a  and connected with the [place]? N/A  If yes, was [place] transfer form completed and [facility] notified? Caregiver Contact: Thien Butler (sister) [phone] or Fallon Vaz (family member) [phone]  Discharge Caregiver contacted prior to discharge? Family to be contacted    UPDATE: 4:06PM    CM informed that once Hep Panel is complete, clinicals can be sent to IPR liaison at: [facility]. CM informed that weekend staff can contact: Fred Armendariz [phone]. CM will continue to follow. DARREL Bustillo, [facility]        UPDATE: 12:32PM    CM received a call from [facility] liaison regarding pt's acceptance. It was reported that [facility] is unable to accept pt at this time due to pt needing HD, and they currently do not have a open chair for pt to obtain HD, when admitted to their facility. CM will inform pt that another IPR facility will assist pt with rehab at the time of d/c-[facility]. CM spoke with liaison at [facility], and it was reported that they are willing to accept pt for his IPR needs, but pt will require Hep B Panel, since his last panel was over 30 days. CM inform that physician will need to provide order for continued HD at [facility] and indicate and pt's d/c order. CM has informed physician of the following. CM will continue to follow, and enter d/c delay. CM place pt on will call. DARREL Bustillo, [facility]          INITIAL NOTE: CM informed by [facility] liaison  that pt has been accepted for IPR.   CM informed by liaison that pt will have to be in contact room, and she is currently verifying bed availability. CM was asked to schedule transport for today at [facility] in case bed is available (completed with [facility]). CM will inform clinical team.  Pt will inform pt of the following, via room phone due to past covd-19 infection. CM will inform clinical staff that pt's transition to IPR will require EMTALA-CM to provide accepting physician and call report info. CM will continue to follow.

## 2022-01-07 NOTE — PROGRESS NOTES
End of Shift Note    Bedside shift change report given to Anna Khalil RN (oncoming nurse) by Gregg Anne RN (offgoing nurse). Report included the following information SBAR, Kardex and MAR    Shift worked:  7a - 7p     Shift summary and any significant changes:     Dressing on R groin changed x 1. PRN oxycodone-acetaminophen given x 1. Concerns for physician to address: Pt requesting for suture in L upper arm to be removed. Zone phone for oncoming shift:   [phone]       Activity:  Activity Level: Up with Assistance  Number times ambulated in hallways past shift: 0  Number of times OOB to chair past shift: 0    Cardiac:   Cardiac Monitoring: No      Cardiac Rhythm: Sinus Rhythm    Access:   Current line(s): PIV     Genitourinary:   Urinary status: anuric    Respiratory:   O2 Device: None (Room air)  Chronic home O2 use?: NO  Incentive spirometer at bedside: NO     GI:  Last Bowel Movement Date: 01/05/22  Current diet:  ADULT DIET Regular; Low Potassium (Less than 3000 mg/day); Low Phosphorus (Less than 1000 mg)  ADULT ORAL NUTRITION SUPPLEMENT Lunch; Clear Liquid  ADULT ORAL NUTRITION SUPPLEMENT Breakfast, Dinner; Renal Supplement  Passing flatus: YES  Tolerating current diet: YES       Pain Management:   Patient states pain is manageable on current regimen: YES    Skin:  Yong Score: 18  Interventions: increase time out of bed, foam dressing, PT/OT consult and nutritional support     Patient Safety:  Fall Score:  Total Score: 5  Interventions: bed/chair alarm, assistive device (walker, cane, etc), gripper socks, pt to call before getting OOB and stay with me (per policy)  High Fall Risk: Yes    Length of Stay:  Expected LOS: 4d 9h  Actual LOS: 11      Gregg Anne RN

## 2022-01-07 NOTE — PROGRESS NOTES
Problem: Falls - Risk of  Goal: *Absence of Falls  Description: Document Suri Litter Fall Risk and appropriate interventions in the flowsheet. Outcome: Progressing Towards Goal  Note: Fall Risk Interventions:  Mobility Interventions: PT Consult for mobility concerns    Mentation Interventions: Bed/chair exit alarm    Medication Interventions: Patient to call before getting OOB    Elimination Interventions: Call light in reach    History of Falls Interventions:  Investigate reason for fall

## 2022-01-07 NOTE — PROGRESS NOTES
Hospital follow-up PCP transitional care appointment has been scheduled with Dr. Eliel Hall on 1/21/22 at 12:25pm. This is an update to his original appointment of 1/7/2022. Patient did not discharge on 1/6/2022. Pending patient discharge.   Sunshine Meyer, Care Management Assistant

## 2022-01-07 NOTE — PROGRESS NOTES
Hospitalist Progress Note    NAME: Hosea Pinedo   :  1975   MRN:  [id number]       Assessment / Plan:  Acute blood loss anemia secondary to gastric ulcer status post multiple blood transfusions  Ventricular tachyarrhythmia  Extensive right lower extremity DVT status post thrombectomy and IVC filter placement  End-stage renal disease on hemodialysis  Pain control. Waiting for placement. Patient had extensive long stay initially wanted to go home but at the  outpatient change want to go to rehab. Patient is chronic dialysis will need continuous dialysis outpatient.       / Body mass index is 22.55 kg/m². Estimated discharge date 1/10/2022  Barriers: Patient was supposed to leave today facility called apparently they need hepatitis panel and also needs an order for dialysis I defer to the nephrologist.    Code status: Full code  Prophylaxis: SCDs status post IVC filter  Recommended Disposition: SNF     Subjective:     Chief Complaint / Reason for Physician Visit    Discussed with RN events overnight. Patient seen and examined at bedside comfortable no events overnight. Review of Systems:  Symptom Y/N Comments  Symptom Y/N Comments   Fever/Chills n   Chest Pain     Poor Appetite    Edema     Cough n   Abdominal Pain     Sputum n   Joint Pain     SOB/ZAMUDIO n   Pruritis/Rash     Nausea/vomit n   Tolerating PT/OT     Diarrhea n   Tolerating Diet     Constipation n   Other       Could NOT obtain due to:      Objective:     VITALS:   Last 24hrs VS reviewed since prior progress note.  Most recent are:  Patient Vitals for the past 24 hrs:   Temp Pulse Resp BP SpO2   22 0745 98.7 °F (37.1 °C) 82 18 117/71 96 %   22 2340 99 °F (37.2 °C) (!) 102 18 114/72 97 %   22 1937 98.4 °F (36.9 °C) (!) 110 18 117/83 99 %   22 1613 98.3 °F (36.8 °C) (!) 111 19 109/71 97 %     No intake or output data in the 24 hours ending 22 1207     I had a face to face encounter and independently examined this patient on 1/7/2022, as outlined below:  PHYSICAL EXAM:  General: WD, WN. Alert, cooperative, no acute distress    EENT:  EOMI. Anicteric sclerae. MMM  Resp:  CTA bilaterally, no wheezing or rales. No accessory muscle use  CV:  Regular  rhythm,  No edema  GI:  Soft, Non distended, Non tender. +Bowel sounds  Neurologic:  Alert and oriented X 3, normal speech,   Psych:   Good insight. Not anxious nor agitated      Reviewed most current lab test results and cultures  YES  Reviewed most current radiology test results   YES  Review and summation of old records today    NO  Reviewed patient's current orders and MAR    YES  PMH/ reviewed - no change compared to H&P  ________________________________________________________________________  Care Plan discussed with:    Comments   Patient x    Family      RN     Care Manager     Consultant                        Multidiciplinary team rounds were held today with , nursing, pharmacist and clinical coordinator. Patient's plan of care was discussed; medications were reviewed and discharge planning was addressed. ________________________________________________________________________  Total NON critical care TIME:      Total CRITICAL CARE TIME Spent:   Minutes non procedure based      Comments   >50% of visit spent in counseling and coordination of care     ________________________________________________________________________  Maria Esther Jimenez MD     Procedures: see electronic medical records for all procedures/Xrays and details which were not copied into this note but were reviewed prior to creation of Plan. LABS:  I reviewed today's most current labs and imaging studies.   Pertinent labs include:  Recent Labs     01/05/22  1119   WBC 14.1*   HGB 8.1*   HCT 25.1*   *     Recent Labs     01/05/22  1119      K 3.6   CL 98   CO2 30   *   BUN 41*   CREA 6.01*   CA 7.8*   PHOS 3.2       Signed: Maria Esther Jimenez MD

## 2022-01-08 ENCOUNTER — APPOINTMENT (OUTPATIENT)
Dept: GENERAL RADIOLOGY | Age: 47
DRG: 270 | End: 2022-01-08
Attending: NURSE PRACTITIONER
Payer: MEDICARE

## 2022-01-08 ENCOUNTER — APPOINTMENT (OUTPATIENT)
Dept: GENERAL RADIOLOGY | Age: 47
DRG: 270 | End: 2022-01-08
Attending: INTERNAL MEDICINE
Payer: MEDICARE

## 2022-01-08 LAB
B PERT DNA SPEC QL NAA+PROBE: NOT DETECTED
BORDETELLA PARAPERTUSSIS PCR, BORPAR: NOT DETECTED
C PNEUM DNA SPEC QL NAA+PROBE: NOT DETECTED
FLUAV H1 2009 PAND RNA SPEC QL NAA+PROBE: NOT DETECTED
FLUAV H1 RNA SPEC QL NAA+PROBE: NOT DETECTED
FLUAV H3 RNA SPEC QL NAA+PROBE: NOT DETECTED
FLUAV SUBTYP SPEC NAA+PROBE: NOT DETECTED
FLUBV RNA SPEC QL NAA+PROBE: NOT DETECTED
HADV DNA SPEC QL NAA+PROBE: NOT DETECTED
HBV CORE IGM SER QL: NONREACTIVE
HBV SURFACE AB SER QL: REACTIVE
HBV SURFACE AB SER-ACNC: 703.92 MIU/ML
HBV SURFACE AG SER QL: <0.1 INDEX
HBV SURFACE AG SER QL: NEGATIVE
HCOV 229E RNA SPEC QL NAA+PROBE: NOT DETECTED
HCOV HKU1 RNA SPEC QL NAA+PROBE: NOT DETECTED
HCOV NL63 RNA SPEC QL NAA+PROBE: NOT DETECTED
HCOV OC43 RNA SPEC QL NAA+PROBE: NOT DETECTED
HMPV RNA SPEC QL NAA+PROBE: NOT DETECTED
HPIV1 RNA SPEC QL NAA+PROBE: NOT DETECTED
HPIV2 RNA SPEC QL NAA+PROBE: NOT DETECTED
HPIV3 RNA SPEC QL NAA+PROBE: NOT DETECTED
HPIV4 RNA SPEC QL NAA+PROBE: NOT DETECTED
LACTATE SERPL-SCNC: 2.1 MMOL/L (ref 0.4–2)
M PNEUMO DNA SPEC QL NAA+PROBE: NOT DETECTED
PROCALCITONIN SERPL-MCNC: 7.55 NG/ML
RSV RNA SPEC QL NAA+PROBE: NOT DETECTED
RV+EV RNA SPEC QL NAA+PROBE: NOT DETECTED
SARS-COV-2 PCR, COVPCR: DETECTED

## 2022-01-08 PROCEDURE — 36415 COLL VENOUS BLD VENIPUNCTURE: CPT

## 2022-01-08 PROCEDURE — 74018 RADEX ABDOMEN 1 VIEW: CPT

## 2022-01-08 PROCEDURE — 65660000001 HC RM ICU INTERMED STEPDOWN

## 2022-01-08 PROCEDURE — 74011250637 HC RX REV CODE- 250/637: Performed by: SURGERY

## 2022-01-08 PROCEDURE — 83605 ASSAY OF LACTIC ACID: CPT

## 2022-01-08 PROCEDURE — 0202U NFCT DS 22 TRGT SARS-COV-2: CPT

## 2022-01-08 PROCEDURE — 84145 PROCALCITONIN (PCT): CPT

## 2022-01-08 PROCEDURE — 74011250636 HC RX REV CODE- 250/636: Performed by: INTERNAL MEDICINE

## 2022-01-08 PROCEDURE — 74011000258 HC RX REV CODE- 258: Performed by: INTERNAL MEDICINE

## 2022-01-08 PROCEDURE — 87040 BLOOD CULTURE FOR BACTERIA: CPT

## 2022-01-08 PROCEDURE — 71045 X-RAY EXAM CHEST 1 VIEW: CPT

## 2022-01-08 PROCEDURE — 74011250637 HC RX REV CODE- 250/637: Performed by: NURSE PRACTITIONER

## 2022-01-08 PROCEDURE — 74011000250 HC RX REV CODE- 250: Performed by: SURGERY

## 2022-01-08 PROCEDURE — P9047 ALBUMIN (HUMAN), 25%, 50ML: HCPCS | Performed by: INTERNAL MEDICINE

## 2022-01-08 RX ORDER — ALBUMIN HUMAN 250 G/1000ML
25 SOLUTION INTRAVENOUS ONCE
Status: COMPLETED | OUTPATIENT
Start: 2022-01-08 | End: 2022-01-08

## 2022-01-08 RX ORDER — VANCOMYCIN 1.75 GRAM/500 ML IN 0.9 % SODIUM CHLORIDE INTRAVENOUS
1750 ONCE
Status: COMPLETED | OUTPATIENT
Start: 2022-01-08 | End: 2022-01-08

## 2022-01-08 RX ORDER — FENTANYL CITRATE 50 UG/ML
25 INJECTION, SOLUTION INTRAMUSCULAR; INTRAVENOUS ONCE
Status: COMPLETED | OUTPATIENT
Start: 2022-01-09 | End: 2022-01-09

## 2022-01-08 RX ADMIN — ALBUMIN (HUMAN) 25 G: 0.25 INJECTION, SOLUTION INTRAVENOUS at 10:07

## 2022-01-08 RX ADMIN — SODIUM CHLORIDE, PRESERVATIVE FREE 10 ML: 5 INJECTION INTRAVENOUS at 16:15

## 2022-01-08 RX ADMIN — SUCRALFATE 1 G: 1 TABLET ORAL at 21:39

## 2022-01-08 RX ADMIN — SUCRALFATE 1 G: 1 TABLET ORAL at 11:50

## 2022-01-08 RX ADMIN — ALBUMIN (HUMAN) 25 G: 0.25 INJECTION, SOLUTION INTRAVENOUS at 16:14

## 2022-01-08 RX ADMIN — ACETAMINOPHEN 325 MG: 325 TABLET ORAL at 16:36

## 2022-01-08 RX ADMIN — Medication 1 AMPULE: at 10:10

## 2022-01-08 RX ADMIN — OXYCODONE 10 MG: 5 TABLET ORAL at 01:24

## 2022-01-08 RX ADMIN — OXYCODONE 10 MG: 5 TABLET ORAL at 16:36

## 2022-01-08 RX ADMIN — PANTOPRAZOLE SODIUM 40 MG: 40 TABLET, DELAYED RELEASE ORAL at 16:14

## 2022-01-08 RX ADMIN — OXYCODONE 10 MG: 5 TABLET ORAL at 20:40

## 2022-01-08 RX ADMIN — Medication: at 09:51

## 2022-01-08 RX ADMIN — OXYCODONE 10 MG: 5 TABLET ORAL at 07:03

## 2022-01-08 RX ADMIN — ALUMINUM HYDROXIDE, MAGNESIUM HYDROXIDE, AND SIMETHICONE 30 ML: 200; 200; 20 SUSPENSION ORAL at 03:33

## 2022-01-08 RX ADMIN — VANCOMYCIN HYDROCHLORIDE 1750 MG: 500 INJECTION, POWDER, LYOPHILIZED, FOR SOLUTION INTRAVENOUS at 10:12

## 2022-01-08 RX ADMIN — ACETAMINOPHEN 325 MG: 325 TABLET ORAL at 01:23

## 2022-01-08 RX ADMIN — SUCRALFATE 1 G: 1 TABLET ORAL at 16:14

## 2022-01-08 RX ADMIN — SUCRALFATE 1 G: 1 TABLET ORAL at 07:03

## 2022-01-08 RX ADMIN — ACETAMINOPHEN 325 MG: 325 TABLET ORAL at 07:03

## 2022-01-08 RX ADMIN — PANTOPRAZOLE SODIUM 40 MG: 40 TABLET, DELAYED RELEASE ORAL at 07:02

## 2022-01-08 RX ADMIN — PIPERACILLIN AND TAZOBACTAM 3.38 G: 3; .375 INJECTION, POWDER, LYOPHILIZED, FOR SOLUTION INTRAVENOUS at 10:08

## 2022-01-08 RX ADMIN — SODIUM CHLORIDE 250 ML: 9 INJECTION, SOLUTION INTRAVENOUS at 16:18

## 2022-01-08 RX ADMIN — Medication 1 AMPULE: at 21:40

## 2022-01-08 RX ADMIN — ACETAMINOPHEN 325 MG: 325 TABLET ORAL at 11:48

## 2022-01-08 RX ADMIN — OXYCODONE 10 MG: 5 TABLET ORAL at 11:48

## 2022-01-08 NOTE — PROGRESS NOTES
End of Shift Note    Bedside shift change report given to Juleinne Glynn (oncoming nurse) by Mark Peng RN (offgoing nurse). Report included the following information SBAR, Kardex and ED Summary    Shift worked:  7a-7p     Shift summary and any significant changes:    Pt had dialysis in the evening, pt more active and moving with the new PRN paid meds. Changed dressing x2. Concerns for physician to address:  none     Zone phone for oncoming shift:   none       Activity:  Activity Level: Up with Assistance  Number times ambulated in hallways past shift: 1  Number of times OOB to chair past shift: 2    Cardiac:   Cardiac Monitoring: No      Cardiac Rhythm: Sinus Rhythm    Access:   Current line(s): HD access     Genitourinary:   Urinary status: anuric    Respiratory:   O2 Device: None (Room air)  Chronic home O2 use?: NO  Incentive spirometer at bedside: NO     GI:  Last Bowel Movement Date: 01/06/22  Current diet:  ADULT DIET Regular; Low Potassium (Less than 3000 mg/day); Low Phosphorus (Less than 1000 mg)  ADULT ORAL NUTRITION SUPPLEMENT Lunch; Clear Liquid  ADULT ORAL NUTRITION SUPPLEMENT Breakfast, Dinner; Renal Supplement  Passing flatus: YES  Tolerating current diet: YES       Pain Management:   Patient states pain is manageable on current regimen: YES    Skin:  Yong Score: 19  Interventions: increase time out of bed    Patient Safety:  Fall Score:  Total Score: 5  Interventions: bed/chair alarm and pt to call before getting OOB  High Fall Risk: Yes    Length of Stay:  Expected LOS: 4d 9h  Actual LOS: Keron Guzmán RN

## 2022-01-08 NOTE — PROGRESS NOTES
End of Shift Note    Bedside shift change report given to [facility] RN (oncoming nurse) by Lio Andrade (offgoing nurse). Report included the following information SBAR, Kardex, Procedure Summary, Intake/Output, MAR and Recent Results    Shift worked:  7p-7a     Shift summary and any significant changes:     patient ran a elevated temp of 102.8 after dialysis, awaiting assistance to draw blood work and regain IV access, attempts to restart IV so far failed. Patient up to bathroom with walker and had a small BM, pain medication given when requested. Patient currently dangling at bedside. Concerns for physician to address:  PICC team to draw blood work and establish IV access,            Activity:  Activity Level: Up with Assistance  Number times ambulated in hallways past shift: 0  Number of times OOB to chair past shift: 1    Cardiac:   Cardiac Monitoring: No      Cardiac Rhythm: Sinus Rhythm    Access:   Current line(s): HD access     Genitourinary:   Urinary status: anuric    Respiratory:   O2 Device: None (Room air)  Chronic home O2 use?: NO  Incentive spirometer at bedside: NO     GI:  Last Bowel Movement Date: 01/06/22  Current diet:  ADULT DIET Regular; Low Potassium (Less than 3000 mg/day); Low Phosphorus (Less than 1000 mg)  ADULT ORAL NUTRITION SUPPLEMENT Lunch; Clear Liquid  ADULT ORAL NUTRITION SUPPLEMENT Breakfast, Dinner; Renal Supplement  Passing flatus: YES  Tolerating current diet: YES       Pain Management:   Patient states pain is manageable on current regimen: YES    Skin:  Yong Score: 19  Interventions: float heels, increase time out of bed, PT/OT consult and nutritional support     Patient Safety:  Fall Score:  Total Score: 5  Interventions: assistive device (walker, cane, etc), gripper socks and pt to call before getting OOB  High Fall Risk: Yes    Length of Stay:  Expected LOS: 4d 9h  Actual LOS: [address]

## 2022-01-08 NOTE — PROGRESS NOTES
Received notification from bedside RN about patient with regards to: hyperthermia, recently dialyzed  VS: /65, , RR 18, O2 sat 96% on RA    Intervention given: ordered lactic, blood culture, procalcitonin, give PRN Tylenol

## 2022-01-08 NOTE — PROGRESS NOTES
Pharmacy Antimicrobial Kinetic Dosing    Indication for Antimicrobials: Sepsis of unknown etiology     Current Regimen of Each Antimicrobial:  Vancomycin - Pharmacy to Dose (Start Date 22; Day # 1)  Pip-Tazo 3.375 g IV Q12H (Start Date 22; Day #1)    Previous Antimicrobial Therapy:      Goal Level: VANCOMYCIN TROUGH GOAL RANGE    Vancomycin Trough: 20 - 25 mcg/mL    Date Dose & Interval Measured (mcg/mL) Predicted AUC/ZECHARIAH                       Date & time of next level: TBD      Significant Positive Cultures:   None    Conditions for Dosing Consideration: Hemodialysis    Labs:  Recent Labs     22  1624 22  1119   CREA 6.26* 6.01*   BUN 49* 41*     Recent Labs     22  1624 22  1119   WBC 16.6* 14.1*     Temp (24hrs), Av.5 °F (37.5 °C), Min:97.4 °F (36.3 °C), Max:102.8 °F (39.3 °C)        Creatinine Clearance (mL/min):   CrCl (Ideal Body Weight): 14.1   If actual weight < IBW: CrCl (Actual Body Weight) 13.9    Impression/Plan:   Vancomycin dosed for HD  Change Pip/Tazo dose to 3.375 g IV Q12H  Antimicrobial stop date TBD     Pharmacy will follow daily and adjust medications as appropriate for renal function and/or serum levels.     Thank you,  Alvni Savage, PHARMD    Vancomycin Dosing Document    Documents located on pharmacy Teams site: Clinical Practice -> Antimicrobial Stewardship -> Antibiotics_Vancomycin     Aminoglycoside Dosing Document    Documents located on pharmacy Teams site: Clinical Practice -> Antimicrobial Stewardship -> Antibiotics_Aminoglycosides

## 2022-01-08 NOTE — PROGRESS NOTES
Hospitalist Progress Note    NAME: Ashia Gama   :  1975   MRN:  [id number]       Assessment / Plan: This is a 57-year-old male with a history of dilated, end-stage renal disease, on hemodialysis , Streptococcus intermedius bacteremia in 2021, status post IV thank stopped 2021, gastric varices with upper GI bleed, thrombocytopenia, transaminitis, previous Covid pneumonia, chronically anticoagulated with warfarin for recurrent DVT, acute on chronic systolic heart failure with an EF of 35%, history of V. tach status post AICD, CAD, severe pulmonary hypertension who presented to the emergency room  with complaints of epigastric abdominal pain and vomiting throughout the day. He was discharged  for the same symptoms having received an EGD with banding of esophageal varices as well as COVID-19 pneumonia. Patient had an unwitnessed fall in the emergency room. He received CT of the head and C-spine both of which were negative for any acute process. He was admitted to the ICU for management of an upper GI bleed and intubated. He transferred out of the ICU . Fever   Severe Sepsis of unknown origin  Hypotension   Patient spiked fever overnight highest 102.8, was tachycardic with heart rate between 101-120.   Sepsis protocol initiated  Lactic acid 2.5, repeat lactic acid until his resolution  ivf bolus 250 cc, and albumin  Will start IV antibiotics cefepime and vancomycin  Check chest x-ray  Transfer to stepdown unit    Hemorrhagic shock secondary to acute blood loss anemia, complicated by anemia of CKD and thrombocytopenia, secondary to upper GI bleed, bleeding gastric varices  -Status post 13 units of PRBCs, 3 units of plasma, 2 units of cryoprecipitate, 3 units of platelets, pressors, octreotide drip, PPI  -Status post 4 EGDs, most recent EGD here , which showed 2 healing esophageal ulcers without stigmata, large gastric varices in the stomach, with 1 gastric varices oozing blood status post epinephrine injection, 2 hemoclips. -Was felt to not be a candidate for TIPS due to severe right heart failure  -He was reintubated December 28 for IR evaluation for BRTO December 28, but he was found to have a large gastric renal shunt which would deem the procedure to the unlikely of any benefit, if even possible, making TIPS the only viable option  -TIPS procedure attempted 12/30 but his portal pressures were not consistent with portal hypertension, therefore he was not a candidate  -Liver was found to be normal on ultrasound and CT was negative for signs of cirrhosis  -?   Passive congestion from right-sided heart failure causing acute liver decompensation but has improved, but patient is too high risk to do a liver biopsy currently  -Appreciate recommendations from GI and IR  - Per GI: Serial H&H, advance diet, if he rebleeds, could possibly need a repeat EGD to rule out Dieulafoy lesion of stomach  -Continue pantoprazole, Carafate  -01/08: hb stable around 8 , no bleeding    Difficult IV  access  Left femoral line placed on 01/08 , anesthesia note unable to place line on the jugular site  Acute hypoxic respiratory failure status post extubation  -Intubated December 28, extubated December 29  Currently on room air      ESRD on hemodialysis, status post renal transplant x2  -Nephrology on board, appreciate the recommendations.  -Continue Retacrit    Heart failure reduced EF, not in exacerbation  -Last EF 35% (November 2021)  -Not currently on a beta-blocker or an ACE inhibitor in the setting of acute illness    History of HIT  -Avoid heparin  -SCDs contraindicated due to PAD     Extensive right lower extremity DVT status post thrombectomy and IVC filter placement 01/04  -Vascular on board- appreciate the recommendations   -Duplex dated 12-30-21: Graft 1: left fem-tibial graft  The graft inflow demonstrates <50% stenosis. The proximal anastomosis demonstrates <50% stenosis. The proximal graft demonstrates <50% stenosis. The mid graft demonstrates <50% stenosis. The distal graft demonstrates <50% stenosis. The distal anastomosis demonstrates <50% stenosis. The graft outflow demonstrates severe (>75%) stenosis. The mid, distal and outflow graft flow is monophasic. The inflow and proximal graft flow is triphasic. -PEETR 12-30-21: PVR waveforms in the right ankle consistent with moderate disease. PVR waveforms at the right metatarsal region consistent with moderate disease. PVR waveforms in the left ankle consistent with moderate disease. PVR waveforms of the left metatarsal region consistent with severe disease. Unable to obtain left brachial pressure due to AV Fistula. Ankle/metatarsal pressures only, patient has LLE bypass graft. Right DP is non compressible, RLE appears moderate. Left PT is non compressible, LLE appears severe with absent digit waveform and absent pulses. LLE bypass graft appears patent. Left PTA proximal appears occluded. Left JOEY proximal appears monophasic with a velocity of 11 cm/s. Deconditioning  -PT/OT    Chronic pain, chronically on opioids  -As needed fentanyl, Percocet, Tylenol    Wounds to left second and third great toes  -Continue wound care: wash with soap and water, dry completely. paint with betadine/povidine swabs  Weave a 4x4 between toes. Left arm swelling in the setting of known central venous stenosis  · Left arm swelling- s/p angioplasty of left subclavian vein  -    18.5 - 24.9 Normal weight / Body mass index is 22.55 kg/m². Estimated discharge date: January 4  Barriers:    Code status: Full  Prophylaxis: H2B/PPI  Recommended Disposition: Home w/Family     Subjective:     Chief Complaint / Reason for Physician Visit  Follow-up sepsis  Patient spiked fever overnight and this morning. Patient reported a runny nose but no cough  Denies any abdominal pain, nausea or vomiting. he is complaining of left leg pain  Review of Systems:  Symptom Y/N Comments  Symptom Y/N Comments   Fever/Chills N   Chest Pain N    Poor Appetite N   Edema Y L LEG   Cough N   Abdominal Pain Y Chronic    Sputum N   Joint Pain N    SOB/ZAMUDIO N   Pruritis/Rash N    Nausea/vomit n   Tolerating PT/OT -    Diarrhea N   Tolerating Diet y    Constipation N   Other Y      Could NOT obtain due to: N/A     Objective:     VITALS:   Last 24hrs VS reviewed since prior progress note. Most recent are:  Patient Vitals for the past 24 hrs:   Temp Pulse Resp BP SpO2   01/08/22 0535 100 °F (37.8 °C) (!) 120 18 (!) 93/48 98 %   01/07/22 2355 (!) 102.8 °F (39.3 °C) (!) 120 18 126/65 96 %   01/07/22 2022 99.1 °F (37.3 °C) (!) 108 17 126/74 --   01/07/22 2015 -- (!) 107 -- 126/66 --   01/07/22 2000 -- (!) 104 -- 120/71 --   01/07/22 1945 -- (!) 103 -- 135/75 --   01/07/22 1930 -- (!) 103 -- 135/74 --   01/07/22 1915 -- (!) 101 -- 119/62 --   01/07/22 1900 -- 92 -- 124/67 --   01/07/22 1845 -- 98 -- 126/71 --   01/07/22 1830 -- 96 -- 134/81 --   01/07/22 1815 -- (!) 107 -- 127/73 --   01/07/22 1800 -- 90 -- 126/65 --   01/07/22 1745 -- 94 -- 120/66 --   01/07/22 1730 -- 92 -- 133/72 --   01/07/22 1715 -- 98 -- 127/73 --   01/07/22 1700 97.4 °F (36.3 °C) 93 16 (!) 106/56 --   01/07/22 1543 98 °F (36.7 °C) 97 18 115/84 100 %       Intake/Output Summary (Last 24 hours) at 1/8/2022 3813  Last data filed at 1/7/2022 2022  Gross per 24 hour   Intake --   Output 2000 ml   Net -2000 ml        I had a face to face encounter and independently examined this patient on 1/8/2022, as outlined below:  PHYSICAL EXAM:  General: Alert, cooperative, no acute distress    EENT:  Anicteric sclerae. MMM  Resp:  Diminished bilaterally, no wheezing or rales  CV:  Regular  rhythm, no murmurs, rubs or gallops  GI:  Soft, Non distended,  \  +Bowel sounds  Neurologic:  Alert and oriented X 3, normal speech,   Psych:   Good insight.  Not anxious nor agitated  Skin:  No rashes. No jaundice. Dressing to LUE fistula CDI  MSK:  Moves all extremities  PVS:   Palpable pulses, left leg and left arm with significant non-pitting edema    Reviewed most current lab test results and cultures  YES  Reviewed most current radiology test results   YES  Review and summation of old records today    NO  Reviewed patient's current orders and MAR    YES  PMH/SH reviewed - no change compared to H&P  ________________________________________________________________________  Care Plan discussed with:    Comments   Patient Y    Family      RN Y    Care Manager     Consultant                        Multidiciplinary team rounds were held today with , nursing, pharmacist and clinical coordinator. Patient's plan of care was discussed; medications were reviewed and discharge planning was addressed. ________________________________________________________________________      Comments   >50% of visit spent in counseling and coordination of care Y    ________________________________________________________________________  Litzy Hermosillo MD     Procedures: see electronic medical records for all procedures/Xrays and details which were not copied into this note but were reviewed prior to creation of Plan. LABS:  I reviewed today's most current labs and imaging studies.   Pertinent labs include:  Recent Labs     01/07/22  1624 01/05/22  1119   WBC 16.6* 14.1*   HGB 8.0* 8.1*   HCT 24.7* 25.1*    130*     Recent Labs     01/07/22  1624 01/05/22  1119   * 137   K 4.1 3.6   CL 96* 98   CO2 29 30   GLU 98 123*   BUN 49* 41*   CREA 6.26* 6.01*   CA 7.8* 7.8*   PHOS 3.2 3.2   ALB 1.8*  --        Signed: Litzy Hermosillo MD

## 2022-01-08 NOTE — PROGRESS NOTES
Problem: Falls - Risk of  Goal: *Absence of Falls  Description: Document Italia Stone Fall Risk and appropriate interventions in the flowsheet. Outcome: Progressing Towards Goal  Note: Fall Risk Interventions:  Mobility Interventions: Communicate number of staff needed for ambulation/transfer,Patient to call before getting OOB,PT Consult for mobility concerns    Mentation Interventions: Adequate sleep, hydration, pain control,Door open when patient unattended    Medication Interventions: Patient to call before getting OOB,Teach patient to arise slowly    Elimination Interventions: Call light in reach,Patient to call for help with toileting needs    History of Falls Interventions: Bed/chair exit alarm,Door open when patient unattended         Problem: Pressure Injury - Risk of  Goal: *Prevention of pressure injury  Description: Document Yong Scale and appropriate interventions in the flowsheet.   Outcome: Progressing Towards Goal  Note: Pressure Injury Interventions:  Sensory Interventions: Assess changes in LOC    Moisture Interventions: Absorbent underpads,Maintain skin hydration (lotion/cream),Moisture barrier    Activity Interventions: Pressure redistribution bed/mattress(bed type),PT/OT evaluation    Mobility Interventions: Pressure redistribution bed/mattress (bed type),PT/OT evaluation    Nutrition Interventions: Document food/fluid/supplement intake    Friction and Shear Interventions: Foam dressings/transparent film/skin sealants,HOB 30 degrees or less,Lift sheet                Problem: Nutrition Deficits  Goal: Optimize nutrtional status  Outcome: Progressing Towards Goal     Problem: Fatigue  Goal: Verbalize increase energy and improved vitality  Outcome: Progressing Towards Goal

## 2022-01-08 NOTE — PROGRESS NOTES
Blood culture obtained from right [facility] and right wrist  COVID PCR respiratory panel obtained and sent to the lab  Pt refused to straight cath to obtain urine culture; made MD aware of it. IV placed by PICC team this morning, the lumen slipped out after pt using a rest room. Requested to PICC team for help with new IV placement. Order on central line placement received via Perfect Serve form Dr. Russell Cisneros. TRANSFER - OUT REPORT:    Verbal report given to MANUELITO Clemente(name) on [facility]  being transferred to PCU, 2205(unit) for change in patient condition(fever, increased Lactic)       Report consisted of patients Situation, Background, Assessment and   Recommendations(SBAR). Information from the following report(s) SBAR, Kardex, Recent Results and Quality Measures was reviewed with the receiving nurse. Lines:   Peripheral IV 01/08/22 Anterior;Right Forearm (Active)   Site Assessment Clean, dry, & intact 01/08/22 0902       Peripheral IV 01/08/22 Right Forearm (Active)   Site Assessment Clean, dry, & intact 01/08/22 1604   Phlebitis Assessment 0 01/08/22 1604   Infiltration Assessment 0 01/08/22 1604   Dressing Status Clean, dry, & intact;New;Occlusive 01/08/22 1604   Dressing Type Transparent 01/08/22 1604   Hub Color/Line Status Blue;Capped;Flushed;Patent 01/08/22 1604   Action Taken Other (comment) 01/08/22 1604        Opportunity for questions and clarification was provided. Patient transported with:   Patient-specific medications from Pharmacy  Tech   Patient belongings  Chart and medications that have not finished yet due to IV loss x2.

## 2022-01-09 LAB
ANION GAP SERPL CALC-SCNC: 8 MMOL/L (ref 5–15)
BASOPHILS # BLD: 0.1 K/UL (ref 0–0.1)
BASOPHILS NFR BLD: 0 % (ref 0–1)
BUN SERPL-MCNC: 43 MG/DL (ref 6–20)
BUN/CREAT SERPL: 8 (ref 12–20)
CALCIUM SERPL-MCNC: 8.4 MG/DL (ref 8.5–10.1)
CHLORIDE SERPL-SCNC: 94 MMOL/L (ref 97–108)
CO2 SERPL-SCNC: 32 MMOL/L (ref 21–32)
CREAT SERPL-MCNC: 5.4 MG/DL (ref 0.7–1.3)
DIFFERENTIAL METHOD BLD: ABNORMAL
EOSINOPHIL # BLD: 0.1 K/UL (ref 0–0.4)
EOSINOPHIL NFR BLD: 1 % (ref 0–7)
ERYTHROCYTE [DISTWIDTH] IN BLOOD BY AUTOMATED COUNT: 17 % (ref 11.5–14.5)
GLUCOSE SERPL-MCNC: 86 MG/DL (ref 65–100)
HCT VFR BLD AUTO: 23.3 % (ref 36.6–50.3)
HGB BLD-MCNC: 7.4 G/DL (ref 12.1–17)
IMM GRANULOCYTES # BLD AUTO: 0.1 K/UL (ref 0–0.04)
IMM GRANULOCYTES NFR BLD AUTO: 1 % (ref 0–0.5)
LACTATE SERPL-SCNC: 1.3 MMOL/L (ref 0.4–2)
LYMPHOCYTES # BLD: 2.3 K/UL (ref 0.8–3.5)
LYMPHOCYTES NFR BLD: 13 % (ref 12–49)
MCH RBC QN AUTO: 29.8 PG (ref 26–34)
MCHC RBC AUTO-ENTMCNC: 31.8 G/DL (ref 30–36.5)
MCV RBC AUTO: 94 FL (ref 80–99)
MONOCYTES # BLD: 1.9 K/UL (ref 0–1)
MONOCYTES NFR BLD: 11 % (ref 5–13)
NEUTS SEG # BLD: 12.6 K/UL (ref 1.8–8)
NEUTS SEG NFR BLD: 74 % (ref 32–75)
NRBC # BLD: 0.02 K/UL (ref 0–0.01)
NRBC BLD-RTO: 0.1 PER 100 WBC
PLATELET # BLD AUTO: 188 K/UL (ref 150–400)
PMV BLD AUTO: 12.3 FL (ref 8.9–12.9)
POTASSIUM SERPL-SCNC: 4 MMOL/L (ref 3.5–5.1)
RBC # BLD AUTO: 2.48 M/UL (ref 4.1–5.7)
SODIUM SERPL-SCNC: 134 MMOL/L (ref 136–145)
WBC # BLD AUTO: 17.1 K/UL (ref 4.1–11.1)

## 2022-01-09 PROCEDURE — 85025 COMPLETE CBC W/AUTO DIFF WBC: CPT

## 2022-01-09 PROCEDURE — 74011250636 HC RX REV CODE- 250/636: Performed by: NURSE PRACTITIONER

## 2022-01-09 PROCEDURE — 83605 ASSAY OF LACTIC ACID: CPT

## 2022-01-09 PROCEDURE — 74011250637 HC RX REV CODE- 250/637: Performed by: INTERNAL MEDICINE

## 2022-01-09 PROCEDURE — 74011250637 HC RX REV CODE- 250/637: Performed by: SURGERY

## 2022-01-09 PROCEDURE — 74011250636 HC RX REV CODE- 250/636: Performed by: INTERNAL MEDICINE

## 2022-01-09 PROCEDURE — 80048 BASIC METABOLIC PNL TOTAL CA: CPT

## 2022-01-09 PROCEDURE — 74011000258 HC RX REV CODE- 258: Performed by: INTERNAL MEDICINE

## 2022-01-09 PROCEDURE — 74011250637 HC RX REV CODE- 250/637: Performed by: NURSE PRACTITIONER

## 2022-01-09 PROCEDURE — 74011000250 HC RX REV CODE- 250: Performed by: SURGERY

## 2022-01-09 PROCEDURE — 65660000001 HC RM ICU INTERMED STEPDOWN

## 2022-01-09 PROCEDURE — 36415 COLL VENOUS BLD VENIPUNCTURE: CPT

## 2022-01-09 RX ORDER — HYDROMORPHONE HYDROCHLORIDE 2 MG/1
2 TABLET ORAL
Status: DISCONTINUED | OUTPATIENT
Start: 2022-01-09 | End: 2022-01-09

## 2022-01-09 RX ORDER — HYDROMORPHONE HYDROCHLORIDE 2 MG/1
2 TABLET ORAL
Status: DISCONTINUED | OUTPATIENT
Start: 2022-01-09 | End: 2022-01-18 | Stop reason: HOSPADM

## 2022-01-09 RX ADMIN — Medication 1 AMPULE: at 08:29

## 2022-01-09 RX ADMIN — FENTANYL CITRATE 25 MCG: 50 INJECTION INTRAMUSCULAR; INTRAVENOUS at 00:47

## 2022-01-09 RX ADMIN — ACETAMINOPHEN 650 MG: 325 TABLET ORAL at 06:06

## 2022-01-09 RX ADMIN — PIPERACILLIN AND TAZOBACTAM 3.38 G: 3; .375 INJECTION, POWDER, LYOPHILIZED, FOR SOLUTION INTRAVENOUS at 00:43

## 2022-01-09 RX ADMIN — Medication: at 08:26

## 2022-01-09 RX ADMIN — SODIUM CHLORIDE, PRESERVATIVE FREE 10 ML: 5 INJECTION INTRAVENOUS at 06:43

## 2022-01-09 RX ADMIN — OXYCODONE 10 MG: 5 TABLET ORAL at 09:30

## 2022-01-09 RX ADMIN — HYDROMORPHONE HYDROCHLORIDE 2 MG: 2 TABLET ORAL at 13:57

## 2022-01-09 RX ADMIN — Medication: at 17:41

## 2022-01-09 RX ADMIN — HYDROMORPHONE HYDROCHLORIDE 2 MG: 2 TABLET ORAL at 22:36

## 2022-01-09 RX ADMIN — OXYCODONE 10 MG: 5 TABLET ORAL at 16:44

## 2022-01-09 RX ADMIN — PANTOPRAZOLE SODIUM 40 MG: 40 TABLET, DELAYED RELEASE ORAL at 08:25

## 2022-01-09 RX ADMIN — SUCRALFATE 1 G: 1 TABLET ORAL at 12:01

## 2022-01-09 RX ADMIN — Medication: at 01:17

## 2022-01-09 RX ADMIN — SODIUM CHLORIDE, PRESERVATIVE FREE 10 ML: 5 INJECTION INTRAVENOUS at 14:17

## 2022-01-09 RX ADMIN — OXYCODONE 10 MG: 5 TABLET ORAL at 05:02

## 2022-01-09 RX ADMIN — PANTOPRAZOLE SODIUM 40 MG: 40 TABLET, DELAYED RELEASE ORAL at 16:44

## 2022-01-09 RX ADMIN — SUCRALFATE 1 G: 1 TABLET ORAL at 08:25

## 2022-01-09 RX ADMIN — SUCRALFATE 1 G: 1 TABLET ORAL at 21:09

## 2022-01-09 RX ADMIN — SODIUM CHLORIDE, PRESERVATIVE FREE 10 ML: 5 INJECTION INTRAVENOUS at 00:43

## 2022-01-09 RX ADMIN — PIPERACILLIN AND TAZOBACTAM 3.38 G: 3; .375 INJECTION, POWDER, LYOPHILIZED, FOR SOLUTION INTRAVENOUS at 12:01

## 2022-01-09 RX ADMIN — SODIUM CHLORIDE, PRESERVATIVE FREE 10 ML: 5 INJECTION INTRAVENOUS at 21:09

## 2022-01-09 RX ADMIN — SUCRALFATE 1 G: 1 TABLET ORAL at 16:44

## 2022-01-09 RX ADMIN — OXYCODONE 10 MG: 5 TABLET ORAL at 21:09

## 2022-01-09 NOTE — PROGRESS NOTES
0700: Bedside shift change report given to Pilar Gordon RN (oncoming nurse) by Jasmeet Reich (offgoing nurse). Report included the following information SBAR, Kardex, Intake/Output, MAR, Recent Results and Cardiac Rhythm Sinus Tachy. 2367: Pt complaining of pain that is not relieved by Roxicodone, Perfect Serve sent to MD.     7152: Percocet taken into room, pt then refused percocet. D/t pt being COVID+ pill was unable to be returned to [facility]. Pill wasted with [facility] JANYRN.     0930: Pt given Roxicodone for 8/10 pain. 1000: Spoke with MD Geo she does not want the pt to get Iv pain pains at this time. Pt is aware. 1345: MD at bedside to speak with pt. MD placed new order for PO Dilaudid. 1530: Wound care completed on all wounds. Right thigh noted to be swollen,red and tender. Perfect Serve sent to MD Geo.    0913: No new orders received from MD Geo regarding right leg. Will continue to monitor and notify provider of any changes. 1900:   End of Shift Note    Bedside shift change report given to MANUELITO Ponce (oncoming nurse) by Pilar Gordon RN (offgoing nurse). Report included the following information SBAR, Kardex, Intake/Output, MAR, Recent Results and Cardiac Rhythm Sinus Tachy    Shift worked:  0842-7789     Shift summary and any significant changes:     See above     Concerns for physician to address:  none     Zone phone for oncoming shift:          Activity:  Activity Level: Up with Assistance  Number times ambulated in hallways past shift: 0  Number of times OOB to chair past shift: 0    Cardiac:   Cardiac Monitoring: Yes      Cardiac Rhythm: Sinus Tachy    Access:   Current line(s): central line and HD access     Genitourinary:   Urinary status: anuric    Respiratory:   O2 Device: None (Room air)  Chronic home O2 use?: NO  Incentive spirometer at bedside: NO     GI:  Last Bowel Movement Date: 01/06/22  Current diet:  ADULT DIET Regular; Low Potassium (Less than 3000 mg/day);  Low Phosphorus (Less than 1000 mg)  ADULT ORAL NUTRITION SUPPLEMENT Lunch; Clear Liquid  ADULT ORAL NUTRITION SUPPLEMENT Breakfast, Dinner; Renal Supplement  Passing flatus: YES  Tolerating current diet: YES       Pain Management:   Patient states pain is manageable on current regimen: YES    Skin:  Yong Score: 19  Interventions: increase time out of bed and limit briefs    Patient Safety:  Fall Score:  Total Score: 5  Interventions: bed/chair alarm, assistive device (walker, cane, etc), gripper socks, pt to call before getting OOB and stay with me (per policy)  High Fall Risk: Yes    Length of Stay:  Expected LOS: 4d 9h  Actual LOS: 149 Maxime Diamond RN

## 2022-01-09 NOTE — PROGRESS NOTES
Received notification from bedside RN about patient with regards to: recently placed left femoral central line, needs order for Xray confirmation and requesting medication for insertion site 9/10 pain  VS: BP 99/56, , RR 15, O2 sat 100% on RA    Intervention given: Abd 1V XR, Fentanyl 25 mcg IV x 1 dose ordered

## 2022-01-09 NOTE — ROUTINE PROCESS
End of Shift Note    Bedside shift change report given to  (oncoming nurse) by Jc Dye RN (offgoing nurse). Report included the following information SBAR and Cardiac Rhythm NSR ,ST BBB    Shift worked:  nights       Shift summary and any significant changes:   Received from ONCOLOGY awake A& O x 3  Vitals stable oriented to unit ,Pt asking for Fentanyl  for right leg pain ,Messaged NP ordered will give when line in confirmed in place . Dual skin done . Afebrile   Labs to be done in am    Right lower extremity surgical dsg wrapped with ace elevated SCD on as per pt   DSG CDI ,leg has some swelling warm and good circulation ,Upper right thigh dressing CDI old central line site oozing clear as per pt dsg CDI now . Left upper arm stitch still I place CDI ,Fistula looks good . ON turn team   Mews score 3 due to sinus Tach Md is aware being treated for sepsis denies cardio/pulm distress does c/o right leg pain medicated . Charge nurse aware blood pressure good   Mews 3 with tachycardia temp checked oral axil 99.9 . Checked groin 102 . Tylenol 650 mg given    NP messaged to notify No new orders      Concerns for physician to address:       Zone phone for oncoming shift:          Activity:  Activity Level: Up with Assistance  Number times ambulated in hallways past shift: 0  Number of times OOB to chair past shift: 0    Cardiac:   Cardiac Monitoring: Yes      Cardiac Rhythm: Sinus Rhythm,Sinus Tachy    Access:   Current line(s): central line  ,HD fistula   Genitourinary:   Urinary status: anuric    Respiratory:   O2 Device: None (Room air)  Chronic home O2 use?: NO  Incentive spirometer at bedside: NO     GI:  Last Bowel Movement Date: 01/06/22  Current diet:  ADULT DIET Regular; Low Potassium (Less than 3000 mg/day);  Low Phosphorus (Less than 1000 mg)  ADULT ORAL NUTRITION SUPPLEMENT Lunch; Clear Liquid  ADULT ORAL NUTRITION SUPPLEMENT Breakfast, Dinner; Renal Supplement  Passing flatus: YES  Tolerating current diet: YES       Pain Management:   Patient states pain is manageable on current regimen: YES    Skin:  Yong Score: 19  Interventions: turn team, speciality bed, float heels, increase time out of bed, PT/OT consult, limit briefs and internal/external urinary devices    Patient Safety:  Fall Score:  Total Score: 5  Interventions: bed/chair alarm, gripper socks, pt to call before getting OOB and stay with me (per policy)  High Fall Risk: Yes    Length of Stay:  Expected LOS: 4d 9h  Actual LOS: 14      Delfino Gallagher RN

## 2022-01-09 NOTE — PROGRESS NOTES
1620: TRANSFER - IN REPORT:    Verbal report received from [place], RN (name) on Yoanna Muller  being received from oncology (unit) for change in patient condition(step down bed needed)      Report consisted of patients Situation, Background, Assessment and   Recommendations(SBAR). Information from the following report(s) SBAR, Kardex, MAR, Recent Results, Intake/Output, and cardiac rhythm was reviewed with the receiving nurse. Opportunity for questions and clarification was provided. Assessment completed upon patients arrival to unit and care assumed.      1900: Bedside shift given to Georgia Pozo

## 2022-01-09 NOTE — ROUTINE PROCESS
1924 Anesthesia called to make aware need for cental line insertion . for room 2205   Call to Oncology to see if patient has consent for insertion ,it was not obtained yet ,Awaiitng transfer     2000 Anesthesia on floor for central line      0020 KUB results in NP messaged to confirm placement ,she said let anesthesia confirm   0020 I spoke to Dr Natasha Fernandes he said ok to use     Primary Nurse Abrahan Monae, RN and Lisa Rowan RN  RN performed a dual skin assessment on this patient Impairment noted- see wound doc flow sheet  Yong score is 19

## 2022-01-09 NOTE — PROGRESS NOTES
Hospitalist Progress Note    NAME: Saturnino Day   :  1975   MRN:  [id number]       Assessment / Plan: This is a 44-year-old male with a history of dilated, end-stage renal disease, on hemodialysis , Streptococcus intermedius bacteremia in 2021, status post IV thank stopped 2021, gastric varices with upper GI bleed, thrombocytopenia, transaminitis, previous Covid pneumonia, chronically anticoagulated with warfarin for recurrent DVT, acute on chronic systolic heart failure with an EF of 35%, history of V. tach status post AICD, CAD, severe pulmonary hypertension who presented to the emergency room  with complaints of epigastric abdominal pain and vomiting throughout the day. He was discharged  for the same symptoms having received an EGD with banding of esophageal varices as well as COVID-19 pneumonia. Patient had an unwitnessed fall in the emergency room. He received CT of the head and C-spine both of which were negative for any acute process. He was admitted to the ICU for management of an upper GI bleed and intubated. He transferred out of the ICU . Covid 19 positive   Fever   Severe Sepsis of unknown origin  Hypotension   Patient spiked fever overnight,  highest 102.8, was tachycardic with heart rate between 101-120. Sepsis protocol initiated  Lactic acid 2.5, repeat lactic acid until his resolution. Lactic acid resolved after IV fluid  ivf bolus 250 cc, and albumin  Continue with IV antibiotics cefepime and vancomycin  chest x-ray negative for pneumonia  Blood cultures negative to date  Continues to spike fever, had fever of 10 2 in the morning  COVID test came back positive, patient is not hypoxic.   We will hold off giving steroid    Hemorrhagic shock secondary to acute blood loss anemia, complicated by anemia of CKD and thrombocytopenia, secondary to upper GI bleed, bleeding gastric varices  -Status post 13 units of PRBCs, 3 units of plasma, 2 units of cryoprecipitate, 3 units of platelets, pressors, octreotide drip, PPI  -Status post 4 EGDs, most recent EGD here December 26, which showed 2 healing esophageal ulcers without stigmata, large gastric varices in the stomach, with 1 gastric varices oozing blood status post epinephrine injection, 2 hemoclips. -Was felt to not be a candidate for TIPS due to severe right heart failure  -He was reintubated December 28 for IR evaluation for BRTO December 28, but he was found to have a large gastric renal shunt which would deem the procedure to the unlikely of any benefit, if even possible, making TIPS the only viable option  -TIPS procedure attempted 12/30 but his portal pressures were not consistent with portal hypertension, therefore he was not a candidate  -Liver was found to be normal on ultrasound and CT was negative for signs of cirrhosis  -?   Passive congestion from right-sided heart failure causing acute liver decompensation but has improved, but patient is too high risk to do a liver biopsy currently  -Appreciate recommendations from GI and IR  - Per GI: Serial H&H, advance diet, if he rebleeds, could possibly need a repeat EGD to rule out Dieulafoy lesion of stomach  -Continue pantoprazole, Carafate  -01/08: hb stable around 8 , no bleeding  01/09: Hemoglobin 7.4  Acute hypoxic respiratory failure status post extubation  -Intubated December 28, extubated December 29  Currently on room air      ESRD on hemodialysis, status post renal transplant x2  -Nephrology on board, appreciate the recommendations.  -Continue Retacrit    Heart failure reduced EF, not in exacerbation  -Last EF 35% (November 2021)  -Not currently on a beta-blocker or an ACE inhibitor in the setting of acute illness    History of HIT  -Avoid heparin  -SCDs contraindicated due to PAD     Extensive right lower extremity DVT status post thrombectomy and IVC filter placement  S/p s/p venous thrombectomy and IVC filter placement 1/4  -Vascular on board- appreciate the recommendations   -Duplex dated 12-30-21: Graft 1: left fem-tibial graft  The graft inflow demonstrates <50% stenosis. The proximal anastomosis demonstrates <50% stenosis. The proximal graft demonstrates <50% stenosis. The mid graft demonstrates <50% stenosis. The distal graft demonstrates <50% stenosis. The distal anastomosis demonstrates <50% stenosis. The graft outflow demonstrates severe (>75%) stenosis. The mid, distal and outflow graft flow is monophasic. The inflow and proximal graft flow is triphasic. -PETER 12-30-21: PVR waveforms in the right ankle consistent with moderate disease. PVR waveforms at the right metatarsal region consistent with moderate disease. PVR waveforms in the left ankle consistent with moderate disease. PVR waveforms of the left metatarsal region consistent with severe disease. Unable to obtain left brachial pressure due to AV Fistula. Ankle/metatarsal pressures only, patient has LLE bypass graft. Right DP is non compressible, RLE appears moderate. Left PT is non compressible, LLE appears severe with absent digit waveform and absent pulses. LLE bypass graft appears patent. Left PTA proximal appears occluded. Left JOEY proximal appears monophasic with a velocity of 11 cm/s. Deconditioning  -PT/OT    Chronic pain, chronically on opioids  Patient reported being in a lot of pain despite being on oxycodone  Want IV pain medication, explained to him that oral pain medication provide better pain control. Will order p.o. Dilaudid every 8 hours only given if ox Roxicodone does not provide pain relief. We will avoid IV narcotics    Wounds to left second and third great toes  -Continue wound care: wash with soap and water, dry completely. paint with betadine/povidine swabs  Weave a 4x4 between toes.     Left arm swelling in the setting of known central venous stenosis  · Left arm swelling- s/p angioplasty of left subclavian vein  -    18.5 - 24.9 Normal weight / Body mass index is 21.77 kg/m². Estimated discharge date: January 4  Barriers:    Code status: Full  Prophylaxis: H2B/PPI  Recommended Disposition: Home w/Family     Subjective:     Chief Complaint / Reason for Physician Visit  Follow-up sepsis  Complains of left leg pain  Review of Systems:  Symptom Y/N Comments  Symptom Y/N Comments   Fever/Chills N   Chest Pain N    Poor Appetite N   Edema Y L LEG   Cough N   Abdominal Pain Y Chronic    Sputum N   Joint Pain N    SOB/ZAMUDIO N   Pruritis/Rash N    Nausea/vomit n   Tolerating PT/OT -    Diarrhea N   Tolerating Diet y    Constipation N   Other Y      Could NOT obtain due to:      Objective:     VITALS:   Last 24hrs VS reviewed since prior progress note. Most recent are:  Patient Vitals for the past 24 hrs:   Temp Pulse Resp BP SpO2   01/09/22 0658 99.8 °F (37.7 °C) (!) 121 18 112/62 93 %   01/09/22 0557 (!) 102 °F (38.9 °C) -- -- -- --   01/09/22 0556 99.9 °F (37.7 °C) -- -- -- --   01/09/22 0555 99.1 °F (37.3 °C) -- -- -- --   01/09/22 0528 99.9 °F (37.7 °C) -- -- -- --   01/09/22 0251 99.8 °F (37.7 °C) (!) 111 18 115/72 95 %   01/09/22 0049 -- -- -- 129/82 --   01/08/22 2250 99.6 °F (37.6 °C) (!) 109 15 (!) 99/56 100 %   01/08/22 2153 98.4 °F (36.9 °C) (!) 114 17 115/75 100 %   01/08/22 2042 100.4 °F (38 °C) (!) 103 18 (!) 109/58 96 %   01/08/22 1157 98.2 °F (36.8 °C) -- 18 104/62 100 %       Intake/Output Summary (Last 24 hours) at 1/9/2022 0913  Last data filed at 1/9/2022 0140  Gross per 24 hour   Intake 440 ml   Output --   Net 440 ml        I had a face to face encounter and independently examined this patient on 1/9/2022, as outlined below:  PHYSICAL EXAM:  General: Alert, cooperative, no acute distress    EENT:  Anicteric sclerae.  MMM  Resp:  Diminished bilaterally, no wheezing or rales  CV:  Regular  rhythm, no murmurs, rubs or gallops  GI:  Soft, Non distended,  \  +Bowel sounds  Neurologic:  Alert and oriented X 3, normal speech,   Psych:   Good insight. Not anxious nor agitated  Skin:  No rashes. No jaundice. Dressing to LUE fistula CDI  MSK:  Moves all extremities  PVS:   Palpable pulses, left leg and left arm with significant non-pitting edema    Reviewed most current lab test results and cultures  YES  Reviewed most current radiology test results   YES  Review and summation of old records today    NO  Reviewed patient's current orders and MAR    YES  PMH/SH reviewed - no change compared to H&P  ________________________________________________________________________  Care Plan discussed with:    Comments   Patient Y    Family      RN Y    Care Manager     Consultant                        Multidiciplinary team rounds were held today with , nursing, pharmacist and clinical coordinator. Patient's plan of care was discussed; medications were reviewed and discharge planning was addressed. ________________________________________________________________________      Comments   >50% of visit spent in counseling and coordination of care Y    ________________________________________________________________________  Fidencio Clarke MD     Procedures: see electronic medical records for all procedures/Xrays and details which were not copied into this note but were reviewed prior to creation of Plan. LABS:  I reviewed today's most current labs and imaging studies.   Pertinent labs include:  Recent Labs     01/09/22  0521 01/07/22  1624   WBC 17.1* 16.6*   HGB 7.4* 8.0*   HCT 23.3* 24.7*    163     Recent Labs     01/09/22  0521 01/07/22  1624   * 132*   K 4.0 4.1   CL 94* 96*   CO2 32 29   GLU 86 98   BUN 43* 49*   CREA 5.40* 6.26*   CA 8.4* 7.8*   PHOS  --  3.2   ALB  --  1.8*       Signed: Fidencio Clarke MD

## 2022-01-09 NOTE — PROGRESS NOTES
01/09/22 1437   Vitals   Temp 98.7 °F (37.1 °C)   Temp Source Oral   Pulse (Heart Rate) (!) 114   Heart Rate Source Monitor   Resp Rate 18   O2 Sat (%) 99 %   Level of Consciousness Alert (0)   /73   MAP (Calculated) 86   BP 1 Location Right upper arm   BP 1 Method Automatic   BP Patient Position At rest   Cardiac Rhythm Sinus Tachy   MEWS Score 3   MEW elevated r/t HR MD aware

## 2022-01-09 NOTE — PROGRESS NOTES
Central Line Procedure Note    Indication: Inadequate venous access    Risks, benefits, alternatives explained and patient agrees to proceed. Patient positioned in Trendelenburg. 7-Step Sterility Protocol followed. (cap, mask sterile gown, sterile gloves, large sterile sheet, hand hygiene, 2% chlorhexidine for cutaneous antisepsis)  5 mL 1% Lidocaine placed at insertion site. Left femoral cannulated x 1 attempt(s) utilizing the Seldinger technique. Catheter secured & Biopatch applied. Sterile Tegaderm placed. Pt stated bilat jugular access was not possible due to clots and numerous unsuccessful attemps recently, thus Fem site was chosen. CXR pending.     Care turned over to covering Attending MD.

## 2022-01-09 NOTE — PROGRESS NOTES
Problem: Falls - Risk of  Goal: *Absence of Falls  Description: Document Boise Rivas Fall Risk and appropriate interventions in the flowsheet. Outcome: Progressing Towards Goal  Note: Fall Risk Interventions:  Mobility Interventions: Bed/chair exit alarm,Patient to call before getting OOB    Mentation Interventions: Bed/chair exit alarm,Adequate sleep, hydration, pain control    Medication Interventions: Bed/chair exit alarm,Patient to call before getting OOB,Teach patient to arise slowly    Elimination Interventions: Call light in reach,Bed/chair exit alarm,Stay With Me (per policy),Toilet paper/wipes in reach    History of Falls Interventions: Bed/chair exit alarm,Room close to nurse's station         Problem: Patient Education: Go to Patient Education Activity  Goal: Patient/Family Education  Outcome: Progressing Towards Goal     Problem: Pressure Injury - Risk of  Goal: *Prevention of pressure injury  Description: Document Yong Scale and appropriate interventions in the flowsheet. Outcome: Progressing Towards Goal  Note: Pressure Injury Interventions:  Sensory Interventions: Assess changes in LOC,Maintain/enhance activity level,Minimize linen layers    Moisture Interventions: Absorbent underpads,Assess need for specialty bed,Limit adult briefs    Activity Interventions: Increase time out of bed,Pressure redistribution bed/mattress(bed type)    Mobility Interventions: Pressure redistribution bed/mattress (bed type)    Nutrition Interventions: Document food/fluid/supplement intake    Friction and Shear Interventions: Minimize layers,Lift sheet                Problem: Patient Education: Go to Patient Education Activity  Goal: Patient/Family Education  Outcome: Progressing Towards Goal     Problem: Breathing Pattern - Ineffective  Goal: Ability to achieve and maintain a regular respiratory rate  Outcome: Progressing Towards Goal     Problem:  Body Temperature -  Risk of, Imbalanced  Goal: Ability to maintain a body temperature within defined limits  Outcome: Progressing Towards Goal  Goal: Will regain or maintain usual level of consciousness  Outcome: Progressing Towards Goal  Goal: Complications related to the disease process, condition or treatment will be avoided or minimized  Outcome: Progressing Towards Goal     Problem: Isolation Precautions - Risk of Spread of Infection  Goal: Prevent transmission of infectious organism to others  Outcome: Progressing Towards Goal     Problem: Nutrition Deficits  Goal: Optimize nutrtional status  Outcome: Progressing Towards Goal     Problem: Risk for Fluid Volume Deficit  Goal: Maintain normal heart rhythm  Outcome: Progressing Towards Goal  Goal: Maintain absence of muscle cramping  Outcome: Progressing Towards Goal  Goal: Maintain normal serum potassium, sodium, calcium, phosphorus, and pH  Outcome: Progressing Towards Goal     Problem: Loneliness or Risk for Loneliness  Goal: Demonstrate positive use of time alone when socialization is not possible  Outcome: Progressing Towards Goal     Problem: Fatigue  Goal: Verbalize increase energy and improved vitality  Outcome: Progressing Towards Goal     Problem: Patient Education: Go to Patient Education Activity  Goal: Patient/Family Education  Outcome: Progressing Towards Goal     Problem: Patient Education: Go to Patient Education Activity  Goal: Patient/Family Education  Outcome: Progressing Towards Goal     Problem: Patient Education: Go to Patient Education Activity  Goal: Patient/Family Education  Outcome: Progressing Towards Goal     Problem: Anemia Care Plan (Adult and Pediatric)  Goal: *Labs within defined limits  Outcome: Progressing Towards Goal  Goal: *Tolerates increased activity  Outcome: Progressing Towards Goal     Problem: Patient Education: Go to Patient Education Activity  Goal: Patient/Family Education  Outcome: Progressing Towards Goal

## 2022-01-10 ENCOUNTER — APPOINTMENT (OUTPATIENT)
Dept: ULTRASOUND IMAGING | Age: 47
DRG: 270 | End: 2022-01-10
Attending: INTERNAL MEDICINE
Payer: MEDICARE

## 2022-01-10 LAB
ALBUMIN SERPL-MCNC: 1.4 G/DL (ref 3.5–5)
ANION GAP SERPL CALC-SCNC: 9 MMOL/L (ref 5–15)
BASOPHILS # BLD: 0 K/UL (ref 0–0.1)
BASOPHILS NFR BLD: 0 % (ref 0–1)
BUN SERPL-MCNC: 58 MG/DL (ref 6–20)
BUN/CREAT SERPL: 9 (ref 12–20)
CALCIUM SERPL-MCNC: 8.5 MG/DL (ref 8.5–10.1)
CHLORIDE SERPL-SCNC: 93 MMOL/L (ref 97–108)
CO2 SERPL-SCNC: 30 MMOL/L (ref 21–32)
CREAT SERPL-MCNC: 6.53 MG/DL (ref 0.7–1.3)
DIFFERENTIAL METHOD BLD: ABNORMAL
EOSINOPHIL # BLD: 0.1 K/UL (ref 0–0.4)
EOSINOPHIL NFR BLD: 1 % (ref 0–7)
ERYTHROCYTE [DISTWIDTH] IN BLOOD BY AUTOMATED COUNT: 17.2 % (ref 11.5–14.5)
GLUCOSE SERPL-MCNC: 100 MG/DL (ref 65–100)
HCT VFR BLD AUTO: 20.5 % (ref 36.6–50.3)
HGB BLD-MCNC: 6.7 G/DL (ref 12.1–17)
HISTORY CHECKED?,CKHIST: NORMAL
IMM GRANULOCYTES # BLD AUTO: 0.1 K/UL (ref 0–0.04)
IMM GRANULOCYTES NFR BLD AUTO: 1 % (ref 0–0.5)
LYMPHOCYTES # BLD: 2.6 K/UL (ref 0.8–3.5)
LYMPHOCYTES NFR BLD: 18 % (ref 12–49)
MCH RBC QN AUTO: 29.1 PG (ref 26–34)
MCHC RBC AUTO-ENTMCNC: 32.7 G/DL (ref 30–36.5)
MCV RBC AUTO: 89.1 FL (ref 80–99)
MONOCYTES # BLD: 1.7 K/UL (ref 0–1)
MONOCYTES NFR BLD: 12 % (ref 5–13)
NEUTS SEG # BLD: 9.7 K/UL (ref 1.8–8)
NEUTS SEG NFR BLD: 68 % (ref 32–75)
NRBC # BLD: 0.04 K/UL (ref 0–0.01)
NRBC BLD-RTO: 0.3 PER 100 WBC
PHOSPHATE SERPL-MCNC: 2.8 MG/DL (ref 2.6–4.7)
PLATELET # BLD AUTO: 160 K/UL (ref 150–400)
PMV BLD AUTO: 11.8 FL (ref 8.9–12.9)
POTASSIUM SERPL-SCNC: 4.3 MMOL/L (ref 3.5–5.1)
RBC # BLD AUTO: 2.3 M/UL (ref 4.1–5.7)
RBC MORPH BLD: ABNORMAL
RBC MORPH BLD: ABNORMAL
SODIUM SERPL-SCNC: 132 MMOL/L (ref 136–145)
WBC # BLD AUTO: 14.2 K/UL (ref 4.1–11.1)

## 2022-01-10 PROCEDURE — 74011250637 HC RX REV CODE- 250/637: Performed by: INTERNAL MEDICINE

## 2022-01-10 PROCEDURE — 36430 TRANSFUSION BLD/BLD COMPNT: CPT

## 2022-01-10 PROCEDURE — 74011250637 HC RX REV CODE- 250/637: Performed by: SURGERY

## 2022-01-10 PROCEDURE — 2709999900 HC NON-CHARGEABLE SUPPLY

## 2022-01-10 PROCEDURE — 86923 COMPATIBILITY TEST ELECTRIC: CPT

## 2022-01-10 PROCEDURE — 77030040392 HC DRSG OPTIFOAM MDII -A

## 2022-01-10 PROCEDURE — 74011000258 HC RX REV CODE- 258: Performed by: INTERNAL MEDICINE

## 2022-01-10 PROCEDURE — P9016 RBC LEUKOCYTES REDUCED: HCPCS

## 2022-01-10 PROCEDURE — 80069 RENAL FUNCTION PANEL: CPT

## 2022-01-10 PROCEDURE — 36415 COLL VENOUS BLD VENIPUNCTURE: CPT

## 2022-01-10 PROCEDURE — 85025 COMPLETE CBC W/AUTO DIFF WBC: CPT

## 2022-01-10 PROCEDURE — 74011000250 HC RX REV CODE- 250: Performed by: SURGERY

## 2022-01-10 PROCEDURE — 93970 EXTREMITY STUDY: CPT

## 2022-01-10 PROCEDURE — 77010033678 HC OXYGEN DAILY

## 2022-01-10 PROCEDURE — 90935 HEMODIALYSIS ONE EVALUATION: CPT

## 2022-01-10 PROCEDURE — 74011250636 HC RX REV CODE- 250/636: Performed by: INTERNAL MEDICINE

## 2022-01-10 PROCEDURE — 74011250636 HC RX REV CODE- 250/636: Performed by: SURGERY

## 2022-01-10 PROCEDURE — 86900 BLOOD TYPING SEROLOGIC ABO: CPT

## 2022-01-10 PROCEDURE — 74011250637 HC RX REV CODE- 250/637: Performed by: NURSE PRACTITIONER

## 2022-01-10 PROCEDURE — 65660000001 HC RM ICU INTERMED STEPDOWN

## 2022-01-10 RX ORDER — SODIUM CHLORIDE 9 MG/ML
250 INJECTION, SOLUTION INTRAVENOUS AS NEEDED
Status: DISCONTINUED | OUTPATIENT
Start: 2022-01-10 | End: 2022-01-13 | Stop reason: SDUPTHER

## 2022-01-10 RX ORDER — ACETAMINOPHEN 325 MG/1
650 TABLET ORAL
Status: DISCONTINUED | OUTPATIENT
Start: 2022-01-11 | End: 2022-01-18 | Stop reason: HOSPADM

## 2022-01-10 RX ORDER — ACETAMINOPHEN 650 MG/1
650 SUPPOSITORY RECTAL
Status: DISCONTINUED | OUTPATIENT
Start: 2022-01-11 | End: 2022-01-18 | Stop reason: HOSPADM

## 2022-01-10 RX ADMIN — VANCOMYCIN HYDROCHLORIDE 750 MG: 750 INJECTION, POWDER, LYOPHILIZED, FOR SOLUTION INTRAVENOUS at 16:37

## 2022-01-10 RX ADMIN — SODIUM CHLORIDE, PRESERVATIVE FREE 10 ML: 5 INJECTION INTRAVENOUS at 21:05

## 2022-01-10 RX ADMIN — Medication: at 20:51

## 2022-01-10 RX ADMIN — PANTOPRAZOLE SODIUM 40 MG: 40 TABLET, DELAYED RELEASE ORAL at 07:43

## 2022-01-10 RX ADMIN — HYDROMORPHONE HYDROCHLORIDE 2 MG: 2 TABLET ORAL at 16:37

## 2022-01-10 RX ADMIN — Medication 1 AMPULE: at 08:11

## 2022-01-10 RX ADMIN — SUCRALFATE 1 G: 1 TABLET ORAL at 16:37

## 2022-01-10 RX ADMIN — Medication: at 08:10

## 2022-01-10 RX ADMIN — SODIUM CHLORIDE, PRESERVATIVE FREE 10 ML: 5 INJECTION INTRAVENOUS at 16:39

## 2022-01-10 RX ADMIN — HYDROMORPHONE HYDROCHLORIDE 2 MG: 2 TABLET ORAL at 07:44

## 2022-01-10 RX ADMIN — ACETAMINOPHEN 650 MG: 325 TABLET ORAL at 23:46

## 2022-01-10 RX ADMIN — ACETAMINOPHEN 650 MG: 325 TABLET ORAL at 20:52

## 2022-01-10 RX ADMIN — PIPERACILLIN AND TAZOBACTAM 3.38 G: 3; .375 INJECTION, POWDER, LYOPHILIZED, FOR SOLUTION INTRAVENOUS at 01:14

## 2022-01-10 RX ADMIN — PIPERACILLIN AND TAZOBACTAM 3.38 G: 3; .375 INJECTION, POWDER, LYOPHILIZED, FOR SOLUTION INTRAVENOUS at 16:37

## 2022-01-10 RX ADMIN — SUCRALFATE 1 G: 1 TABLET ORAL at 07:43

## 2022-01-10 RX ADMIN — OXYCODONE 10 MG: 5 TABLET ORAL at 12:04

## 2022-01-10 RX ADMIN — OXYCODONE 10 MG: 5 TABLET ORAL at 20:52

## 2022-01-10 RX ADMIN — SODIUM CHLORIDE, PRESERVATIVE FREE 10 ML: 5 INJECTION INTRAVENOUS at 05:38

## 2022-01-10 RX ADMIN — SUCRALFATE 1 G: 1 TABLET ORAL at 21:03

## 2022-01-10 RX ADMIN — PANTOPRAZOLE SODIUM 40 MG: 40 TABLET, DELAYED RELEASE ORAL at 16:37

## 2022-01-10 RX ADMIN — OXYCODONE 10 MG: 5 TABLET ORAL at 03:29

## 2022-01-10 RX ADMIN — EPOETIN ALFA-EPBX 20000 UNITS: 20000 INJECTION, SOLUTION INTRAVENOUS; SUBCUTANEOUS at 23:46

## 2022-01-10 NOTE — PROGRESS NOTES
01/10/22 0319   Vitals   Temp 98 °F (36.7 °C)   Temp Source Oral   Pulse (Heart Rate) (!) 115   Resp Rate 25   O2 Sat (%) 94 %   Level of Consciousness Alert (0)   BP 99/65   MAP (Calculated) 76   Cardiac Rhythm Sinus Tachy   MEWS Score 5     Provider aware of current vitals- 1u PRBC ordered

## 2022-01-10 NOTE — PROGRESS NOTES
Received notification from bedside RN about patient with regards to: H/H 6.7/20.5  VS: /66, , RR 26, O2 sat 94% on RA    Intervention given: Transfuse 1 unit PRBC ordered

## 2022-01-10 NOTE — PROGRESS NOTES
TRANSFER - OUT REPORT:    Verbal report given to Rakesh Lopez on Latoya Vasquez  being transferred to [facility](unit) for ordered procedure       Report consisted of patients Situation, Background, Assessment and   Recommendations(SBAR). Information from the following report(s) Kardex was reviewed with the receiving nurse. Opportunity for questions and clarification was provided.       Patient transported with:   HD at bedside

## 2022-01-10 NOTE — PROGRESS NOTES
0700: Verbal shift change report given to Carolyn Hamilton RN (oncoming nurse) by Azalea Murray (offgoing nurse). Report included the following information SBAR, Kardex, Intake/Output, MAR, Recent Results and Cardiac Rhythm Sinus Tachy. 0769: Pt complaining of RLE pain 9/10, Dilaudid given. 0273: Blood transfusion rate increased to 175mL/hr.     0840: Pain was not relieved by MD Jasmyne aware, Relayed message to MD that the patient would like to speak to her. 0848: Blood transfusion complete. 1100: Pt requesting to work with PT/OT spoke with a therapist who will relay message to PT assigned to pt.     1200: Dialysis nurse at bedside. Will call pharmacy to retime Zosyn. 1204: Pt still complaining of pain 8/10, Roxicodone given    1220: PT/OT attempted to see but HD at bedside. They will try to get to him later but if not able they will follow up tomorrow. 1230: Dialysis nurse is removing sutures from site in left arm.     1900:   End of Shift Note    Bedside shift change report given to MANUELITO Ballard (oncoming nurse) by Carolyn Hamilton RN (offgoing nurse). Report included the following information SBAR, Kardex, Intake/Output, MAR, Recent Results and Cardiac Rhythm Sinus Tachy    Shift worked:  [id number]     Shift summary and any significant changes:     See above     Concerns for physician to address: DVT in R leg? Zone phone for oncoming shift:          Activity:  Activity Level: Up with Assistance  Number times ambulated in hallways past shift: 0  Number of times OOB to chair past shift: 0    Cardiac:   Cardiac Monitoring: Yes      Cardiac Rhythm: Sinus Tachy    Access:   Current line(s): central line and HD access     Genitourinary:   Urinary status: anuric    Respiratory:   O2 Device: None (Room air)  Chronic home O2 use?: NO  Incentive spirometer at bedside: YES     GI:  Last Bowel Movement Date: 01/06/22  Current diet:  ADULT DIET Regular; Low Potassium (Less than 3000 mg/day);  Low Phosphorus (Less than 1000 mg)  ADULT ORAL NUTRITION SUPPLEMENT Lunch; Clear Liquid  ADULT ORAL NUTRITION SUPPLEMENT Breakfast, Dinner; Renal Supplement  Passing flatus: YES  Tolerating current diet: YES       Pain Management:   Patient states pain is manageable on current regimen: NO    Skin:  Yong Score: 18  Interventions: float heels, increase time out of bed, foam dressing and limit briefs    Patient Safety:  Fall Score:  Total Score: 5  Interventions: bed/chair alarm, assistive device (walker, cane, etc), gripper socks, pt to call before getting OOB and stay with me (per policy)  High Fall Risk: Yes    Length of Stay:  Expected LOS: 4d 9h  Actual LOS: [address] RN

## 2022-01-10 NOTE — PROGRESS NOTES
1900: Bedside shift change report given to Maikel Dave (oncoming nurse) by Shiela Mackenzie RN (offgoing nurse). Report included the following information SBAR, ED Summary, Intake/Output, MAR, Recent Results and Cardiac Rhythm sinus tach. 0700: End of Shift Note    Bedside shift change report given to Vicki Aragon (oncoming nurse) by Thea Mathis RN (offgoing nurse). Report included the following information SBAR, ED Summary, Intake/Output, MAR, Recent Results and Cardiac Rhythm sinus tach     Shift worked:  night      Shift summary and any significant changes:     1 unit blood ordered-still currently hanging. hgb was 6.7. is requesting IV pain meds. Concerns for physician to address:  see above     Zone phone for oncoming shift:          Activity:  Activity Level: Up with Assistance  Number times ambulated in hallways past shift: 0  Number of times OOB to chair past shift: 0    Cardiac:   Cardiac Monitoring: Yes      Cardiac Rhythm: Sinus Tachy    Access:   Current line(s): central line     Genitourinary:   Urinary status: anuric    Respiratory:   O2 Device: None (Room air)  Chronic home O2 use?: NO  Incentive spirometer at bedside: YES     GI:  Last Bowel Movement Date: 01/06/22  Current diet:  ADULT DIET Regular; Low Potassium (Less than 3000 mg/day); Low Phosphorus (Less than 1000 mg)  ADULT ORAL NUTRITION SUPPLEMENT Lunch; Clear Liquid  ADULT ORAL NUTRITION SUPPLEMENT Breakfast, Dinner; Renal Supplement  Passing flatus: YES  Tolerating current diet: YES       Pain Management:   Patient states pain is manageable on current regimen: YES    Skin:  Yong Score: 19  Interventions: float heels and increase time out of bed    Patient Safety:  Fall Score:  Total Score: 5  Interventions: bed/chair alarm, assistive device (walker, cane, etc), gripper socks and pt to call before getting OOB  High Fall Risk: Yes    Length of Stay:  Expected LOS: 4d 9h  Actual LOS: MANUELITO Grajeda

## 2022-01-10 NOTE — PROGRESS NOTES
Hospitalist Progress Note    NAME: Hosea Pinedo   :  1975   MRN:  [id number]       Assessment / Plan: This is a 43-year-old male with a history of dilated, end-stage renal disease, on hemodialysis , Streptococcus intermedius bacteremia in 2021, status post IV thank stopped 2021, gastric varices with upper GI bleed, thrombocytopenia, transaminitis, previous Covid pneumonia, chronically anticoagulated with warfarin for recurrent DVT, acute on chronic systolic heart failure with an EF of 35%, history of V. tach status post AICD, CAD, severe pulmonary hypertension who presented to the emergency room  with complaints of epigastric abdominal pain and vomiting throughout the day. He was discharged  for the same symptoms having received an EGD with banding of esophageal varices as well as COVID-19 pneumonia. Patient had an unwitnessed fall in the emergency room. He received CT of the head and C-spine both of which were negative for any acute process. He was admitted to the ICU for management of an upper GI bleed and intubated. He transferred out of the ICU . Covid 19 positive   Fever   Severe Sepsis of unknown origin  Hypotension   Patient spiked fever overnight,  highest 102.8, was tachycardic with heart rate between 101-120. Sepsis protocol initiated  Lactic acid 2.5, repeat lactic acid until his resolution. Lactic acid resolved after IV fluid  ivf bolus 250 cc, and albumin  Continue with IV antibiotics cefepime and vancomycin  chest x-ray negative for pneumonia  Blood cultures negative to date  Continues to spike fever, had fever of 10 2 in the morning  COVID test came back positive, patient is not hypoxic. We will hold off giving steroid  01/10: Patient is afebrile today, blood pressure within normal limits  WBC trending down but hemoglobin 6.7. Blood culture negative to date.       Hemorrhagic shock secondary to acute blood loss anemia, complicated by anemia of CKD and thrombocytopenia, secondary to upper GI bleed, bleeding gastric varices  Acute on chronic anemia   -Status post 13 units of PRBCs, 3 units of plasma, 2 units of cryoprecipitate, 3 units of platelets, pressors, octreotide drip, PPI  -Status post 4 EGDs, most recent EGD here December 26, which showed 2 healing esophageal ulcers without stigmata, large gastric varices in the stomach, with 1 gastric varices oozing blood status post epinephrine injection, 2 hemoclips. -Was felt to not be a candidate for TIPS due to severe right heart failure  -He was reintubated December 28 for IR evaluation for BRTO December 28, but he was found to have a large gastric renal shunt which would deem the procedure to the unlikely of any benefit, if even possible, making TIPS the only viable option  -TIPS procedure attempted 12/30 but his portal pressures were not consistent with portal hypertension, therefore he was not a candidate  -Liver was found to be normal on ultrasound and CT was negative for signs of cirrhosis  -?   Passive congestion from right-sided heart failure causing acute liver decompensation but has improved, but patient is too high risk to do a liver biopsy currently  -Appreciate recommendations from GI and IR  - Per GI: Serial H&H, advance diet, if he rebleeds, could possibly need a repeat EGD to rule out Dieulafoy lesion of stomach  -Continue pantoprazole, Carafate  -01/08: hb stable around 8 , no bleeding  01/09: Hemoglobin 7.4  01/10: Hemoglobin 6.7, a unit of PRBC given this morning    Acute hypoxic respiratory failure status post extubation  -Intubated December 28, extubated December 29  Currently on room air      ESRD on hemodialysis, status post renal transplant x2  -Nephrology on board, appreciate the recommendations.  -Continue Retacrit    Heart failure reduced EF, not in exacerbation  -Last EF 35% (November 2021)  -Not currently on a beta-blocker or an ACE inhibitor in the setting of acute illness    History of HIT  -Avoid heparin  -SCDs contraindicated due to PAD     Extensive right lower extremity DVT status post thrombectomy and IVC filter placement  S/p s/p venous thrombectomy and IVC filter placement 1/4  -Vascular on board- appreciate the recommendations   -Duplex dated 12-30-21: Graft 1: left fem-tibial graft  The graft inflow demonstrates <50% stenosis. The proximal anastomosis demonstrates <50% stenosis. The proximal graft demonstrates <50% stenosis. The mid graft demonstrates <50% stenosis. The distal graft demonstrates <50% stenosis. The distal anastomosis demonstrates <50% stenosis. The graft outflow demonstrates severe (>75%) stenosis. The mid, distal and outflow graft flow is monophasic. The inflow and proximal graft flow is triphasic. -PETER 12-30-21: PVR waveforms in the right ankle consistent with moderate disease. PVR waveforms at the right metatarsal region consistent with moderate disease. PVR waveforms in the left ankle consistent with moderate disease. PVR waveforms of the left metatarsal region consistent with severe disease. Unable to obtain left brachial pressure due to AV Fistula. Ankle/metatarsal pressures only, patient has LLE bypass graft. Right DP is non compressible, RLE appears moderate. Left PT is non compressible, LLE appears severe with absent digit waveform and absent pulses. LLE bypass graft appears patent. Left PTA proximal appears occluded.  Left JOEY proximal appears monophasic with a velocity of 11 cm/s.    01/10: Patient continues to complain of pain on his left lower extremity associated with swelling which he Thinks is worse than when he came in  We will order repeat lower extremity Doppler to see if there is any new clot post thrombectomy    Deconditioning  -PT/OT    Chronic pain, chronically on opioids  Patient reported being in a lot of pain despite being on oxycodone  Want IV pain medication, explained to him that oral pain medication provide better pain control. Will order p.o. Dilaudid every 8 hours only given if ox Roxicodone does not provide pain relief. We will avoid IV narcotics    Wounds to left second and third great toes  -Continue wound care: wash with soap and water, dry completely. paint with betadine/povidine swabs  Weave a 4x4 between toes. Left arm swelling in the setting of known central venous stenosis  · Left arm swelling- s/p angioplasty of left subclavian vein  -    18.5 - 24.9 Normal weight / Body mass index is 21.77 kg/m². Estimated discharge date: January 4  Barriers:    Code status: Full  Prophylaxis: H2B/PPI  Recommended Disposition: Home w/Family     Subjective:     Chief Complaint / Reason for Physician Visit  Follow-up sepsis/left lower extremity pain  Patient continues to complain of left lower extremity pain associated with swelling  He denies any bleeding  Review of Systems:  Symptom Y/N Comments  Symptom Y/N Comments   Fever/Chills N   Chest Pain N    Poor Appetite N   Edema Y L LEG   Cough N   Abdominal Pain Y Chronic    Sputum N   Joint Pain N    SOB/ZAMUDIO N   Pruritis/Rash N    Nausea/vomit n   Tolerating PT/OT -    Diarrhea N   Tolerating Diet y    Constipation N   Other Y      Could NOT obtain due to:      Objective:     VITALS:   Last 24hrs VS reviewed since prior progress note.  Most recent are:  Patient Vitals for the past 24 hrs:   Temp Pulse Resp BP SpO2   01/10/22 0745 99.3 °F (37.4 °C) (!) 112 29 110/76 (!) 83 %   01/10/22 0715 99.2 °F (37.3 °C) (!) 109 17 106/77 97 %   01/10/22 0645 99.3 °F (37.4 °C) (!) 113 16 126/76 94 %   01/10/22 0630 99.9 °F (37.7 °C) (!) 109 17 111/72 97 %   01/10/22 0615 -- (!) 106 17 109/74 --   01/10/22 0600 99.9 °F (37.7 °C) (!) 106 20 108/74 98 %   01/10/22 0545 100 °F (37.8 °C) (!) 110 24 103/74 96 %   01/10/22 0539 99.9 °F (37.7 °C) (!) 109 18 107/70 100 %   01/10/22 0524 99.8 °F (37.7 °C) (!) 105 21 93/76 96 %   01/10/22 0319 98 °F (36.7 °C) (!) 115 25 99/65 94 %   01/09/22 2241 97.9 °F (36.6 °C) (!) 124 26 117/66 94 %   01/09/22 1943 98.6 °F (37 °C) (!) 119 21 121/77 99 %   01/09/22 1437 98.7 °F (37.1 °C) (!) 114 18 111/73 99 %   01/09/22 1052 99.9 °F (37.7 °C) (!) 103 18 110/74 99 %       Intake/Output Summary (Last 24 hours) at 1/10/2022 6073  Last data filed at 1/10/2022 0319  Gross per 24 hour   Intake 750 ml   Output 0 ml   Net 750 ml        I had a face to face encounter and independently examined this patient on 1/10/2022, as outlined below:  PHYSICAL EXAM:  General: Alert, cooperative, no acute distress    EENT:  Anicteric sclerae. MMM  Resp:  Diminished bilaterally, no wheezing or rales  CV:  Regular  rhythm, no murmurs, rubs or gallops  GI:  Soft, Non distended,  \  +Bowel sounds  Neurologic:  Alert and oriented X 3, normal speech,   Psych:   Good insight. Not anxious nor agitated  Skin:  No rashes. No jaundice. Dressing to LUE fistula CDI  MSK:  Moves all extremities  PVS:   Palpable pulses, left leg and left arm with significant non-pitting edema    Reviewed most current lab test results and cultures  YES  Reviewed most current radiology test results   YES  Review and summation of old records today    NO  Reviewed patient's current orders and MAR    YES  PMH/ reviewed - no change compared to H&P  ________________________________________________________________________  Care Plan discussed with:    Comments   Patient Y    Family      RN Y    Care Manager     Consultant                        Multidiciplinary team rounds were held today with , nursing, pharmacist and clinical coordinator. Patient's plan of care was discussed; medications were reviewed and discharge planning was addressed.      ________________________________________________________________________      Comments   >50% of visit spent in counseling and coordination of care Y ________________________________________________________________________  Molly Dooley MD     Procedures: see electronic medical records for all procedures/Xrays and details which were not copied into this note but were reviewed prior to creation of Plan. LABS:  I reviewed today's most current labs and imaging studies.   Pertinent labs include:  Recent Labs     01/10/22  0116 01/09/22  0521 01/07/22  1624   WBC 14.2* 17.1* 16.6*   HGB 6.7* 7.4* 8.0*   HCT 20.5* 23.3* 24.7*    188 163     Recent Labs     01/10/22  0116 01/09/22  0521 01/07/22  1624   * 134* 132*   K 4.3 4.0 4.1   CL 93* 94* 96*   CO2 30 32 29    86 98   BUN 58* 43* 49*   CREA 6.53* 5.40* 6.26*   CA 8.5 8.4* 7.8*   PHOS 2.8  --  3.2   ALB 1.4*  --  1.8*       Signed: Molly Dooley MD

## 2022-01-10 NOTE — PROGRESS NOTES
TRANSFER - IN REPORT:    Verbal report received from [facility] on Jackman Saver  being received from [facility](unit) for ordered procedure      Report consisted of patients Situation, Background, Assessment and   Recommendations(SBAR). Information from the following report(s) Kardex was reviewed with the receiving nurse. Opportunity for questions and clarification was provided. Assessment completed upon patients arrival to unit and care assumed.

## 2022-01-10 NOTE — PROGRESS NOTES
Transition of Care Plan:     RUR: 31%   Disposition: [facility] (accepted (waiting on bed availability)  Follow up appointments: Follow up with PCP and/or Specialist   DME needed: N/A  Transportation at Discharge: Family to assist   [place] or means to access home: Family will provide        IM Medicare Letter: 2nd IM Medicare Letter to be given   Is patient a BCPI-A Bundle:   N/A                  If yes, was Bundle Letter given?:    Is patient a [place] and connected with the [facility]? N/A  If yes, was [place] transfer form completed and [facility] notified? Caregiver Contact: Brny Head (sister) [phone] or Carlos Bryant (family member) [phone]  Discharge Caregiver contacted prior to discharge? Family to be contacted    2:27pm- [facility] is able to accept pt when a HD bed becomes available. Pt is also accepted to [facility] and rehab. CM will continue to follow patient for discharge planning needs and arrange for services as deemed necessary.     Patricia Eisenmenger, [place] [address] [place] [address]  [phone]

## 2022-01-10 NOTE — PROCEDURES
Hemodialysis / [phone]    Vitals Pre Post Assessment Pre Post   BP BP: 109/73 (01/10/22 1215) 139/83 LOC A & O x 4 No change   HR Pulse (Heart Rate): 98 (01/10/22 1215) 111 Lungs clear No change   Resp Resp Rate: 22 (01/10/22 1215) 20 Cardiac S1S2 No change   Temp Temp: 98.7 °F (37.1 °C) (01/10/22 1215) 98.3 oral Skin Warm, dry & intact No change   Weight Pre-Dialysis Weight: 65.3 kg (143 lb 15.4 oz) (01/07/22 1700) n/a Edema +3 Lower ext No change   Tele status yes yes Pain Pain Intensity 1: 8 (01/10/22 1204) No change     Orders   Duration: Start: 1215 End: 1545 Total: 3.5   Dialyzer: Dialyzer/Set Up Inspection: Revaclear (01/10/22 1215)   K Bath: Dialysate K (mEq/L): 3 (01/10/22 1215)   Ca Bath: Dialysate CA (mEq/L): 3.0 (01/10/22 1215)   Na: Dialysate NA (mEq/L): 138 (01/10/22 1215)   Bicarb: Dialysate HCO3 (mEq/L): 40 (01/10/22 1215)   Target Fluid Removal: Goal/Amount of Fluid to Remove (mL): 2000 mL (01/10/22 1215)     Access AVG   Type & Location: Left upper arm AVG   Comments:  + thrill and bruit pre and post hd. Cannulated x 2 with 15g needles.                                       Labs   HBsAg (Antigen) / date: Neg       1/7/2021                                        HBsAb (Antibody) / date: Armani Roles 1/7/2021   Source: epic   Obtained/Reviewed  Critical Results Called HGB   Date Value Ref Range Status   01/10/2022 6.7 (L) 12.1 - 17.0 g/dL Final     Potassium   Date Value Ref Range Status   01/10/2022 4.3 3.5 - 5.1 mmol/L Final     Calcium   Date Value Ref Range Status   01/10/2022 8.5 8.5 - 10.1 MG/DL Final     BUN   Date Value Ref Range Status   01/10/2022 58 (H) 6 - 20 MG/DL Final     Creatinine   Date Value Ref Range Status   01/10/2022 6.53 (H) 0.70 - 1.30 MG/DL Final        Meds Given   Name Dose Route                    Adequacy / Fluid    Total Liters Process: 80.2     Net Fluid Removed:       Comments   Time Out Done:   (Time) 1130   Admitting Diagnosis: GIB   Consent obtained/signed: Informed Consent Verified: Yes (01/10/22 1215)   Machine / RO # Machine Number: [id number] (01/10/22 1215)   Primary Nurse Rpt Pre: Darcia Phoenix   Primary Nurse Rpt Post: Darcia Phoenix   Pt Education: Access care   Care Plan: Continue HD   Pts outpatient clinic: HD at home     Tx Summary SUZANNE AVF: skin CDI. No s/s of infection. + B/T. No issues with cannulation or hemostasis. Running well at . I arrived to pt's room A&Ox4. Consent signed & on file. SBAR received from Primary RN. 1215: Pt cannulated with 93Z needles per policy & without issue. Labs drawn per request/ order. VSS. Dialysis Tx initiated. 1245: pt. Stable, lines secure  1315: Pt resting quietly. 1345: pt. Resting well lines secure  1415: pt. Stable, lines secure and visible  1445: Pt. Jules. Hd well   7695: pt. Stable,lines secure and visible  1545: Tx ended. VSS. All possible blood returned to patient. Hemostasis achieved without issue. Bed locked and in the lowest position, call bell and belongings in reach. SBAR given to Primary, RN. Patient is stable at time of their/ my departure. All Dialysis related medications have been reviewed. Comments: RN reviewed LPN assessment and completed RN assessment. RN completed patient assessment. RN reviewed technicians vital signs and procedure note. Tx completed. Reviewed by MANUELITO Ac

## 2022-01-10 NOTE — PROGRESS NOTES
Occupational Therapy    Attempted to see patient for OT treatment, but he is presently on HD. Will defer OT treatment and follow back later today or tomorrow.      Bryan Rodríguez, OTR/L

## 2022-01-10 NOTE — PROGRESS NOTES
Problem: Falls - Risk of  Goal: *Absence of Falls  Description: Document Ravindra Alaniz Fall Risk and appropriate interventions in the flowsheet. Outcome: Progressing Towards Goal  Note: Fall Risk Interventions:  Mobility Interventions: Bed/chair exit alarm,Patient to call before getting OOB,Utilize walker, cane, or other assistive device    Mentation Interventions: Adequate sleep, hydration, pain control,Bed/chair exit alarm,Update white board    Medication Interventions: Bed/chair exit alarm,Patient to call before getting OOB,Teach patient to arise slowly    Elimination Interventions: Call light in reach,Bed/chair exit alarm,Stay With Me (per policy),Toilet paper/wipes in reach    History of Falls Interventions: Bed/chair exit alarm,Investigate reason for fall,Room close to nurse's station         Problem: Patient Education: Go to Patient Education Activity  Goal: Patient/Family Education  Outcome: Progressing Towards Goal     Problem: Pressure Injury - Risk of  Goal: *Prevention of pressure injury  Description: Document Yong Scale and appropriate interventions in the flowsheet. Outcome: Progressing Towards Goal  Note: Pressure Injury Interventions:  Sensory Interventions: Assess changes in LOC,Assess need for specialty bed,Turn and reposition approx.  every two hours (pillows and wedges if needed)    Moisture Interventions: Absorbent underpads,Limit adult briefs,Maintain skin hydration (lotion/cream),Minimize layers    Activity Interventions: Increase time out of bed,Pressure redistribution bed/mattress(bed type)    Mobility Interventions: Pressure redistribution bed/mattress (bed type)    Nutrition Interventions: Document food/fluid/supplement intake    Friction and Shear Interventions: Minimize layers,Lift sheet                Problem: Patient Education: Go to Patient Education Activity  Goal: Patient/Family Education  Outcome: Progressing Towards Goal     Problem: Breathing Pattern - Ineffective  Goal: Ability to achieve and maintain a regular respiratory rate  Outcome: Progressing Towards Goal     Problem:  Body Temperature -  Risk of, Imbalanced  Goal: Ability to maintain a body temperature within defined limits  Outcome: Progressing Towards Goal  Goal: Will regain or maintain usual level of consciousness  Outcome: Progressing Towards Goal  Goal: Complications related to the disease process, condition or treatment will be avoided or minimized  Outcome: Progressing Towards Goal     Problem: Isolation Precautions - Risk of Spread of Infection  Goal: Prevent transmission of infectious organism to others  Outcome: Progressing Towards Goal     Problem: Nutrition Deficits  Goal: Optimize nutrtional status  Outcome: Progressing Towards Goal     Problem: Risk for Fluid Volume Deficit  Goal: Maintain normal heart rhythm  Outcome: Progressing Towards Goal  Goal: Maintain absence of muscle cramping  Outcome: Progressing Towards Goal  Goal: Maintain normal serum potassium, sodium, calcium, phosphorus, and pH  Outcome: Progressing Towards Goal     Problem: Loneliness or Risk for Loneliness  Goal: Demonstrate positive use of time alone when socialization is not possible  Outcome: Progressing Towards Goal     Problem: Fatigue  Goal: Verbalize increase energy and improved vitality  Outcome: Progressing Towards Goal     Problem: Patient Education: Go to Patient Education Activity  Goal: Patient/Family Education  Outcome: Progressing Towards Goal     Problem: Patient Education: Go to Patient Education Activity  Goal: Patient/Family Education  Outcome: Progressing Towards Goal     Problem: Patient Education: Go to Patient Education Activity  Goal: Patient/Family Education  Outcome: Progressing Towards Goal     Problem: Anemia Care Plan (Adult and Pediatric)  Goal: *Labs within defined limits  Outcome: Progressing Towards Goal  Goal: *Tolerates increased activity  Outcome: Progressing Towards Goal     Problem: Patient Education: Go to Patient Education Activity  Goal: Patient/Family Education  Outcome: Progressing Towards Goal

## 2022-01-10 NOTE — PROGRESS NOTES
01/09/22 1943   Vitals   Temp 98.6 °F (37 °C)   Temp Source Oral   Pulse (Heart Rate) (!) 119   Heart Rate Source Monitor   Resp Rate 21   O2 Sat (%) 99 %   Level of Consciousness Alert (0)   /77   MAP (Calculated) 92   BP 1 Location Right upper arm   BP 1 Method Automatic   BP Patient Position At rest   MEWS Score 4     Mews elevated d/t HR and RR- no interventions needed at this time.

## 2022-01-10 NOTE — PROGRESS NOTES
Nephrology Progress Note  Blaise Leblanc     [facility]. [facility]  Phone - [phone]   Patient: Hosea Pinedo    YOB: 1975        Date- 1/10/2022   Admit Date: 12/26/2021  CC: Follow up for ESRD        IMPRESSION & PLAN:   · ESRD - home HD 5 days per week- Follows up with Dr Artis Benton at [facility]  · Covid 19 infection  · Right leg ileofemoral DVT -s/p venous thrombectomy and IVC filter placement 1/4  · H/o esophageal bleed from esophagitis  · Left arm swelling- s/p angioplasty of left subclavian vein  · Gram positive sepsis from dental abcess  · s/p lead extraction at [facility]  · Anemia of ckd  · Hx of renal tx in past times 2.  · Hypertension  · CAD  · Failed RTX times 2  · H/o DVT, s/p ivc filter/ h/o HIT      PLAN-   DIALYSIS TODAY   prbx tx per primary team   Continue epogen     Subjective: Interval History:   bp stable  No sob  Hb low    Objective:   Vitals:    01/10/22 0715 01/10/22 0745 01/10/22 0845 01/10/22 1030   BP: 106/77 110/76 101/72 113/70   Pulse: (!) 109 (!) 112 (!) 108 (!) 108   Resp: 17 29 19 13   Temp: 99.2 °F (37.3 °C) 99.3 °F (37.4 °C) 99 °F (37.2 °C) 98.9 °F (37.2 °C)   TempSrc:       SpO2: 97% (!) 83% 98% 95%   Weight:       Height:          01/09 0701 - 01/10 0700  In: 950 [P.O.:750; I.V.:200]  Out: 0   Last 3 Recorded Weights in this Encounter    12/28/21 0400 01/04/22 0839 01/09/22 0605   Weight: 65.3 kg (143 lb 15.4 oz) 65.3 kg (143 lb 15.4 oz) 63 kg (139 lb)      Physical exam:     GEN: NAD  NECK- Supple  RESP:no distress  NEURO:non focal      Due to the COVID-19 pandemic, in order to reduce the spread and transmission of the virus, some basic elements of the physical exam have been deferred to reduce direct or close contact with the patient . Chart reviewed. Pertinent Notes reviewed.      Data Review :  Recent Labs     01/10/22  0116 01/09/22  0521 01/07/22  1624   * 134* 132*   K 4.3 4.0 4.1   CL 93* 94* 96*   CO2 30 32 29   BUN 58* 43* 49*   CREA 6.53* 5.40* 6.26*    86 98   CA 8.5 8.4* 7.8*   PHOS 2.8  --  3.2     Recent Labs     01/10/22  0116 01/09/22  0521 01/07/22  1624   WBC 14.2* 17.1* 16.6*   HGB 6.7* 7.4* 8.0*   HCT 20.5* 23.3* 24.7*    188 163     No results for input(s): FE, TIBC, PSAT, FERR in the last 72 hours.    Medication list  reviewed  Current Facility-Administered Medications   Medication Dose Route Frequency    0.9% sodium chloride infusion 250 mL  250 mL IntraVENous PRN    HYDROmorphone (DILAUDID) tablet 2 mg  2 mg Oral Q8H PRN    piperacillin-tazobactam (ZOSYN) 3.375 g in 0.9% sodium chloride (MBP/ADV) 100 mL MBP  3.375 g IntraVENous Q12H    VANCOMYCIN INFORMATION NOTE   Other Rx Dosing/Monitoring    oxyCODONE IR (ROXICODONE) tablet 10 mg  10 mg Oral Q4H PRN    And    acetaminophen (TYLENOL) tablet 325 mg  325 mg Oral Q4H PRN    sucralfate (CARAFATE) tablet 1 g  1 g Oral AC&HS    pantoprazole (PROTONIX) tablet 40 mg  40 mg Oral ACB&D    alum-mag hydroxide-simeth (MYLANTA) oral suspension 30 mL  30 mL Oral Q4H PRN    balsam peru-castor oiL (VENELEX) ointment   Topical BID    sodium chloride (NS) flush 5-40 mL  5-40 mL IntraVENous Q8H    sodium chloride (NS) flush 5-40 mL  5-40 mL IntraVENous PRN    acetaminophen (TYLENOL) tablet 650 mg  650 mg Oral Q6H PRN    Or    acetaminophen (TYLENOL) suppository 650 mg  650 mg Rectal Q6H PRN    polyethylene glycol (MIRALAX) packet 17 g  17 g Oral DAILY PRN    ondansetron (ZOFRAN) injection 4 mg  4 mg IntraVENous Q6H PRN    alcohol 62% (NOZIN) nasal  1 Ampule  1 Ampule Topical Q12H    epoetin emilie-epbx (RETACRIT) injection 20,000 Units  20,000 Units SubCUTAneous Q MON, WED & FRI          Philip Stare, MD              [facility]  [facility]  [place], [address]  Phone - [phone]               Fax - [phone]

## 2022-01-10 NOTE — PROGRESS NOTES
Attempted to see patient for PT, but he was unavailable due to HD. Will follow up tomorrow.     Aishwarya Medina, PT

## 2022-01-11 LAB
BASOPHILS # BLD: 0.1 K/UL (ref 0–0.1)
BASOPHILS NFR BLD: 1 % (ref 0–1)
DIFFERENTIAL METHOD BLD: ABNORMAL
EOSINOPHIL # BLD: 0.4 K/UL (ref 0–0.4)
EOSINOPHIL NFR BLD: 4 % (ref 0–7)
ERYTHROCYTE [DISTWIDTH] IN BLOOD BY AUTOMATED COUNT: 17.1 % (ref 11.5–14.5)
HCT VFR BLD AUTO: 23.1 % (ref 36.6–50.3)
HGB BLD-MCNC: 7.6 G/DL (ref 12.1–17)
IMM GRANULOCYTES # BLD AUTO: 0.1 K/UL (ref 0–0.04)
IMM GRANULOCYTES NFR BLD AUTO: 1 % (ref 0–0.5)
LYMPHOCYTES # BLD: 1.4 K/UL (ref 0.8–3.5)
LYMPHOCYTES NFR BLD: 13 % (ref 12–49)
MAGNESIUM SERPL-MCNC: 2.2 MG/DL (ref 1.6–2.4)
MCH RBC QN AUTO: 29.3 PG (ref 26–34)
MCHC RBC AUTO-ENTMCNC: 32.9 G/DL (ref 30–36.5)
MCV RBC AUTO: 89.2 FL (ref 80–99)
MONOCYTES # BLD: 0.9 K/UL (ref 0–1)
MONOCYTES NFR BLD: 8 % (ref 5–13)
NEUTS SEG # BLD: 8.1 K/UL (ref 1.8–8)
NEUTS SEG NFR BLD: 73 % (ref 32–75)
NRBC # BLD: 0.03 K/UL (ref 0–0.01)
NRBC BLD-RTO: 0.3 PER 100 WBC
PLATELET # BLD AUTO: 149 K/UL (ref 150–400)
PMV BLD AUTO: 11.9 FL (ref 8.9–12.9)
RBC # BLD AUTO: 2.59 M/UL (ref 4.1–5.7)
TROPONIN-HIGH SENSITIVITY: 40 NG/L (ref 0–76)
WBC # BLD AUTO: 11 K/UL (ref 4.1–11.1)

## 2022-01-11 PROCEDURE — 74011250637 HC RX REV CODE- 250/637: Performed by: SURGERY

## 2022-01-11 PROCEDURE — 74011250637 HC RX REV CODE- 250/637: Performed by: NURSE PRACTITIONER

## 2022-01-11 PROCEDURE — 84484 ASSAY OF TROPONIN QUANT: CPT

## 2022-01-11 PROCEDURE — 74011000250 HC RX REV CODE- 250: Performed by: SURGERY

## 2022-01-11 PROCEDURE — 85025 COMPLETE CBC W/AUTO DIFF WBC: CPT

## 2022-01-11 PROCEDURE — 74011250637 HC RX REV CODE- 250/637: Performed by: INTERNAL MEDICINE

## 2022-01-11 PROCEDURE — 83735 ASSAY OF MAGNESIUM: CPT

## 2022-01-11 PROCEDURE — 36415 COLL VENOUS BLD VENIPUNCTURE: CPT

## 2022-01-11 PROCEDURE — 74011000258 HC RX REV CODE- 258: Performed by: INTERNAL MEDICINE

## 2022-01-11 PROCEDURE — 65660000001 HC RM ICU INTERMED STEPDOWN

## 2022-01-11 PROCEDURE — 97535 SELF CARE MNGMENT TRAINING: CPT

## 2022-01-11 PROCEDURE — 97530 THERAPEUTIC ACTIVITIES: CPT

## 2022-01-11 PROCEDURE — 97116 GAIT TRAINING THERAPY: CPT

## 2022-01-11 PROCEDURE — 74011250636 HC RX REV CODE- 250/636: Performed by: INTERNAL MEDICINE

## 2022-01-11 RX ADMIN — SUCRALFATE 1 G: 1 TABLET ORAL at 21:30

## 2022-01-11 RX ADMIN — SUCRALFATE 1 G: 1 TABLET ORAL at 12:24

## 2022-01-11 RX ADMIN — PIPERACILLIN AND TAZOBACTAM 3.38 G: 3; .375 INJECTION, POWDER, LYOPHILIZED, FOR SOLUTION INTRAVENOUS at 04:19

## 2022-01-11 RX ADMIN — OXYCODONE 10 MG: 5 TABLET ORAL at 05:38

## 2022-01-11 RX ADMIN — OXYCODONE 10 MG: 5 TABLET ORAL at 15:35

## 2022-01-11 RX ADMIN — SUCRALFATE 1 G: 1 TABLET ORAL at 16:06

## 2022-01-11 RX ADMIN — HYDROMORPHONE HYDROCHLORIDE 2 MG: 2 TABLET ORAL at 01:01

## 2022-01-11 RX ADMIN — Medication: at 21:00

## 2022-01-11 RX ADMIN — SUCRALFATE 1 G: 1 TABLET ORAL at 08:05

## 2022-01-11 RX ADMIN — Medication: at 08:05

## 2022-01-11 RX ADMIN — PANTOPRAZOLE SODIUM 40 MG: 40 TABLET, DELAYED RELEASE ORAL at 08:05

## 2022-01-11 RX ADMIN — SODIUM CHLORIDE, PRESERVATIVE FREE 10 ML: 5 INJECTION INTRAVENOUS at 05:41

## 2022-01-11 RX ADMIN — ACETAMINOPHEN 650 MG: 325 TABLET ORAL at 12:24

## 2022-01-11 RX ADMIN — PIPERACILLIN AND TAZOBACTAM 3.38 G: 3; .375 INJECTION, POWDER, LYOPHILIZED, FOR SOLUTION INTRAVENOUS at 16:06

## 2022-01-11 RX ADMIN — HYDROMORPHONE HYDROCHLORIDE 2 MG: 2 TABLET ORAL at 10:15

## 2022-01-11 RX ADMIN — PANTOPRAZOLE SODIUM 40 MG: 40 TABLET, DELAYED RELEASE ORAL at 16:06

## 2022-01-11 RX ADMIN — SODIUM CHLORIDE, PRESERVATIVE FREE 10 ML: 5 INJECTION INTRAVENOUS at 16:07

## 2022-01-11 RX ADMIN — Medication 1 AMPULE: at 08:05

## 2022-01-11 RX ADMIN — HYDROMORPHONE HYDROCHLORIDE 2 MG: 2 TABLET ORAL at 19:41

## 2022-01-11 NOTE — PROGRESS NOTES
0700: Bedside shift change report given to Dennis Márquez RN (oncoming nurse) by Mayra Slade (offgoing nurse). Report included the following information SBAR, Kardex, Intake/Output, MAR, Recent Results and Cardiac Rhythm Sinus Tachy. 1015: Pt complaining of 7/10 pain in right leg, Dilaudid given. 1022: Femoral line flushed. Blue port noted to have no blood return. Pt has heparin listed as a allergy. Perfect Serve sent to [facility] regarding how to proceed, awaiting a response. 1030: PT/OT at bedside. 1600: Pt got up by himself and took off telemetry monitor. Educated on the importance of calling for help. Pt states he will no longer get up alone. 1800: Pt currently receiving Zosyn antibiotic. Cathflo will be administered after antibiotic is complete. 1900: End of Shift Note    Bedside shift change report given to MANUELITO Ballard (oncoming nurse) by Dennis Márquez RN (offgoing nurse). Report included the following information SBAR, Kardex, Intake/Output, MAR, Recent Results and Cardiac Rhythm ST    Shift worked:  [id number]     Shift summary and any significant changes:     See above      Concerns for physician to address:  none     Zone phone for oncoming shift:          Activity:  Activity Level: Up with Assistance  Number times ambulated in hallways past shift: 0  Number of times OOB to chair past shift: 2    Cardiac:   Cardiac Monitoring: Yes      Cardiac Rhythm: Sinus Tachy    Access:   Current line(s): central line     Genitourinary:   Urinary status: anuric    Respiratory:   O2 Device: None (Room air)  Chronic home O2 use?: NO  Incentive spirometer at bedside: NO     GI:  Last Bowel Movement Date: 01/11/22  Current diet:  ADULT ORAL NUTRITION SUPPLEMENT Breakfast, Lunch, Dinner; Renal Supplement  ADULT DIET Regular; Low Potassium (Less than 3000 mg/day); Low Phosphorus (Less than 1000 mg); Buttered pecan Nepro please!   Passing flatus: YES  Tolerating current diet: YES       Pain Management: Patient states pain is manageable on current regimen: YES    Skin:  Yong Score: 19  Interventions: float heels, increase time out of bed, foam dressing and PT/OT consult    Patient Safety:  Fall Score:  Total Score: 5  Interventions: bed/chair alarm, assistive device (walker, cane, etc), gripper socks, pt to call before getting OOB and stay with me (per policy)  High Fall Risk: Yes    Length of Stay:  Expected LOS: 4d 9h  Actual LOS: 79 Noa Sanchez RN

## 2022-01-11 NOTE — PROGRESS NOTES
1900 Bedside shift change report given to [address] [place] [address] (oncoming nurse) by Terri Espinal (offgoing nurse). Report included the following information SBAR, Kardex, Intake/Output, MAR and Recent Results. 2300 Patient requesting IV pain medication. NP notified. No new orders. Patient educated on taking the PO medications. Patient had MEWs of 5 NP notified. Tylenol changed to Q4.     2324 Patient had 8 beats of V tach NP notified. Mag drawn this morning. 0430 Patient requesting IV pain medication again. NP notified. End of Shift Note    Bedside shift change report given to Terri Espinal (oncoming nurse) by Elaine Barrow RN (offgoing nurse). Report included the following information SBAR, Kardex, Intake/Output, MAR and Recent Results    Shift worked:  Night     Shift summary and any significant changes:     See notes above    Patient had fever last night of 101.4. Tylenol given. NP notified that patients Temperature was still elevated after the tylenol. NP changed tylenol to Q4 to help with fever. Latest temperature taken 98.7. Concerns for physician to address:       [facility]          Activity:  Activity Level: Up with Assistance  Number times ambulated in hallways past shift: 0  Number of times OOB to chair past shift: 0    Cardiac:   Cardiac Monitoring: Yes      Cardiac Rhythm: Sinus Tachy    Access:   Current line(s): central line and HD access     Genitourinary:   Urinary status: anuric    Respiratory:   O2 Device: None (Room air)  Chronic home O2 use?: NO  Incentive spirometer at bedside: YES     GI:  Last Bowel Movement Date: 01/11/21  Current diet:  ADULT DIET Regular; Low Potassium (Less than 3000 mg/day);  Low Phosphorus (Less than 1000 mg)  ADULT ORAL NUTRITION SUPPLEMENT Lunch; Clear Liquid  ADULT ORAL NUTRITION SUPPLEMENT Breakfast, Dinner; Renal Supplement  Passing flatus: YES  Tolerating current diet: YES       Pain Management:   Patient states pain is manageable on current regimen: YES    Skin:  Yong Score: 16  Interventions: float heels    Patient Safety:  Fall Score:  Total Score: 5  Interventions: bed/chair alarm, gripper socks and pt to call before getting OOB  High Fall Risk: Yes    Length of Stay:  Expected LOS: 4d 9h  Actual LOS: MANUELITO Franco

## 2022-01-11 NOTE — PROGRESS NOTES
01/11/22 1044   Vitals   Temp (!) 100.9 °F (38.3 °C)   Temp Source Oral   Pulse (Heart Rate) (!) 108   Heart Rate Source Monitor   Resp Rate 21   O2 Sat (%) 94 %   Level of Consciousness Alert (0)   /79   MAP (Calculated) 92   BP 1 Location Right upper arm   BP 1 Method Automatic   BP Patient Position At rest   Cardiac Rhythm Sinus Tachy   MEWS Score 3   Mew elevated r/t temperature.  Tylenol given

## 2022-01-11 NOTE — PROGRESS NOTES
Comprehensive Nutrition Assessment    Type and Reason for Visit: Reassess    Nutrition Recommendations/Plan:   Continue current diet  Adjust supplements to Nepro TID (buttered pecan) and d/c Ensure clear  Please document % meals and supplements consumed in flowsheet I/O's under intake     Nutrition Assessment:      Chart reviewed. RD spoke with pt over the phone d/t isolation status. He reports his appetite has decreased and he is louise if he finishes 25% of his meals. He is drinking the supplements though and agreed to increasing the Nepro to TID while his appetite is down. Flavor preferences noted. Continue to encourage intake of meals and supplements. Patient Vitals for the past 168 hrs:   % Diet Eaten   01/11/22 0700 51 - 75%   01/10/22 1445 51 - 75%   01/10/22 0715 76 - 100%   01/09/22 1437 76 - 100%   01/09/22 0730 26 - 50%     Wt Readings from Last 5 Encounters:   01/09/22 63 kg (139 lb)   12/21/21 61.3 kg (135 lb 1.6 oz)   11/17/21 66.1 kg (145 lb 12.8 oz)   04/07/21 66.8 kg (147 lb 4.3 oz)   03/27/21 67.4 kg (148 lb 9.4 oz)   ]    Estimated Daily Nutrient Needs:  Energy (kcal): 1951 (BMR 1501 x 1. 3AF); Weight Used for Energy Requirements: Current  Protein (g): 65-78 (1.0-1.2 g/kg bw); Weight Used for Protein Requirements: Current  Fluid (ml/day): 5693-5284 ml/day; Method Used for Fluid Requirements: 1 ml/kcal      Nutrition Related Findings:  Labs: hgb 7.6. Meds: roxicodone, dilaudid, zosyn, carafate. Edema: 3+pitting BLE. BM 1/11. Wounds:    Pressure injury,Stage II,Deep tissue injury,Surgical incision       Current Nutrition Therapies:  ADULT ORAL NUTRITION SUPPLEMENT Breakfast, Lunch, Dinner; Renal Supplement  ADULT DIET Regular; Low Potassium (Less than 3000 mg/day); Low Phosphorus (Less than 1000 mg); Buttered pecan Nepro please!     Anthropometric Measures:  · Height:  5' 7\" (170.2 cm)  · Current Body Wt:  63 kg (138 lb 14.2 oz)      · Ideal Body Wt:  148 lbs:  97.3 %    · BMI Category: Normal weight (BMI 18.5-24. 9)       Nutrition Diagnosis:   · Inadequate protein-energy intake related to  (decreased appetite) as evidenced by intake 0-25%,intake 26-50%      Nutrition Interventions:   Food and/or Nutrient Delivery: Continue current diet,Continue oral nutrition supplement  Nutrition Education and Counseling: No recommendations at this time  Coordination of Nutrition Care: Continue to monitor while inpatient    Goals:  PO intake >50% meals and >70% supplements  next 2-4 days       Nutrition Monitoring and Evaluation:   Behavioral-Environmental Outcomes: None identified  Food/Nutrient Intake Outcomes: Food and nutrient intake,Supplement intake  Physical Signs/Symptoms Outcomes: Biochemical data,GI status,Weight,Skin    Discharge Planning:    Continue current diet,Continue oral nutrition supplement     Electronically signed by Rose Fuentes RD on 1/11/2022 at 11:09 AM    Contact: [facility]-[id number]

## 2022-01-11 NOTE — PROGRESS NOTES
Problem: Self Care Deficits Care Plan (Adult)  Goal: *Acute Goals and Plan of Care (Insert Text)  Description: FUNCTIONAL STATUS PRIOR TO ADMISSION: Patient was modified independent using a Rollator for functional mobility. Lives with sister in upstairs duplex with tub-shower combo. Was driving to/from HD.    HOME SUPPORT: The patient lived with sister but did not require assist.    Occupational Therapy Goals  Initiated 1/6/2022  1. Patient will perform EOB bathing with minimal assistance within 7 day(s). 2.  Patient will perform EOB/RW lower body dressing with minimal assistance and AE within 7 day(s). 3.  Patient will perform RW grooming with minimal assistance within 7 day(s). 4.  Patient will perform RW toilet transfers with contact guard assist within 7 day(s). 5.  Patient will perform all aspects of RW toileting with contact guard assist within 7 day(s). Outcome: Progressing Towards Goal    OCCUPATIONAL THERAPY TREATMENT  Patient: Traci Villafana (44 y.o. male)  Date: 1/11/2022  Diagnosis: GIB (gastrointestinal bleeding) [K92.2] <principal problem not specified>  Procedure(s) (LRB):  RIGHT LEG  VENOUS THROMBECTOMY AND INSERTION OF IVC FILTER (Right) 7 Days Post-Op  Precautions: Fall,Skin,Contact (droplet Plus)  Chart, occupational therapy assessment, plan of care, and goals were reviewed. ASSESSMENT  Patient continues with skilled OT services and is progressing towards goals. Pt rc'd supine in bed on RA agreeable to session, cleared by nrsg. Pt motivated to transition > chair, required SPV to sit EOB. Pt required max A to don socks, limited by fatigue. Pt required min A x2 / HHA x2 to complete a few steps to transition to recliner chair. Once seated, pt educated on PLB and energy conservation when exerting energy and use of Rw / BSC for toileting needs with staff present to assist with pain mgmt/ energy conservation; pt receptive ton provided education.  Pt remains well below baseline and is limited by decreased endurance/ fatigue, cont to recommend IPR at DC. Current Level of Function Impacting Discharge (ADLs): min - max A 2/2 fatigue    Other factors to consider for discharge: below baseline, sig medical hx          PLAN :  Patient continues to benefit from skilled intervention to address the above impairments. Continue treatment per established plan of care to address goals. Recommend with staff: Ryanne Em for all meals     Recommend next OT session: standing grooming, use of RW     Recommendation for discharge: (in order for the patient to meet his/her long term goals)  Therapy 3 hours per day 5-7 days per week    This discharge recommendation:  Has been made in collaboration with the attending provider and/or case management    IF patient discharges home will need the following DME: TBD defer to facility        SUBJECTIVE:   Patient stated I want to get over to the chair if possible I know I can .     OBJECTIVE DATA SUMMARY:   Functional Mobility and Transfers for ADLs:  Bed Mobility:  Supine to Sit: Supervision  Scooting: Supervision    Transfers:  Sit to Stand: Minimum assistance     Bed to Chair: Minimum assistance;Assist x2    Balance:  Sitting: Intact  Standing: Impaired; With support  Standing - Static: Good  Standing - Dynamic : Constant support;Good    ADL Intervention:    Grooming  Position Performed: Seated in chair  Washing Face: Set-up; Supervision      Lower Body Dressing Assistance  Socks: Maximum assistance        Pain:  Pt endorsing pain in RLE. Activity Tolerance:   Fair, SpO2 stable on RA, and requires rest breaks    After treatment patient left in no apparent distress:   Sitting in chair and Call bell within reach    COMMUNICATION/COLLABORATION:   The patients plan of care was discussed with: Physical therapist, Occupational therapist, and Registered nurse.      Marge Max OT  Time Calculation: 25 mins

## 2022-01-11 NOTE — PROGRESS NOTES
01/10/22 2007   Vital Signs   Temp (!) 101.4 °F (38.6 °C)   Temp Source Oral   Pulse (Heart Rate) (!) 111   Heart Rate Source Monitor   Cardiac Rhythm Sinus Tachy   Resp Rate 20   O2 Sat (%) 96 %   Level of Consciousness Alert (0)   /70   MAP (Calculated) 83   BP 1 Method Automatic   BP 1 Location Right upper arm   BP Patient Position At rest   MEWS Score 5       NP aware

## 2022-01-11 NOTE — PROGRESS NOTES
Hospitalist Progress Note    NAME: Priscilla Martinez   :  1975   MRN:  [id number]       Assessment / Plan: This is a 82-year-old male with a history of dilated, end-stage renal disease, on hemodialysis , Streptococcus intermedius bacteremia in 2021, status post IV thank stopped 2021, gastric varices with upper GI bleed, thrombocytopenia, transaminitis, previous Covid pneumonia, chronically anticoagulated with warfarin for recurrent DVT, acute on chronic systolic heart failure with an EF of 35%, history of V. tach status post AICD, CAD, severe pulmonary hypertension who presented to the emergency room  with complaints of epigastric abdominal pain and vomiting throughout the day. He was discharged  for the same symptoms having received an EGD with banding of esophageal varices as well as COVID-19 pneumonia. Patient had an unwitnessed fall in the emergency room. He received CT of the head and C-spine both of which were negative for any acute process. He was admitted to the ICU for management of an upper GI bleed and intubated. He transferred out of the ICU . Covid 19 positive   Fever   Severe Sepsis of unknown origin  Hypotension   Patient spiked fever overnight,  highest 102.8, was tachycardic with heart rate between 101-120. Sepsis protocol initiated  Lactic acid 2.5, repeat lactic acid until his resolution. Lactic acid resolved after IV fluid  ivf bolus 250 cc, and albumin  Continue with IV antibiotics cefepime and vancomycin  chest x-ray negative for pneumonia  Blood cultures negative to date  Continues to spike fever, had fever of 10 2 in the morning  COVID test came back positive, patient is not hypoxic.   We will hold off giving steroid    Patient spiked fever this morning 100.9, WBC trending down  Continue Vanco and Zosyn  01/10: Patient is afebrile today, blood pressure within normal limits  WBC trending down but hemoglobin 6.7. Blood culture negative to date. Hemorrhagic shock secondary to acute blood loss anemia, complicated by anemia of CKD and thrombocytopenia, secondary to upper GI bleed, bleeding gastric varices  Acute on chronic anemia   -Status post 13 units of PRBCs, 3 units of plasma, 2 units of cryoprecipitate, 3 units of platelets, pressors, octreotide drip, PPI  -Status post 4 EGDs, most recent EGD here December 26, which showed 2 healing esophageal ulcers without stigmata, large gastric varices in the stomach, with 1 gastric varices oozing blood status post epinephrine injection, 2 hemoclips. -Was felt to not be a candidate for TIPS due to severe right heart failure  -He was reintubated December 28 for IR evaluation for BRTO December 28, but he was found to have a large gastric renal shunt which would deem the procedure to the unlikely of any benefit, if even possible, making TIPS the only viable option  -TIPS procedure attempted 12/30 but his portal pressures were not consistent with portal hypertension, therefore he was not a candidate  -Liver was found to be normal on ultrasound and CT was negative for signs of cirrhosis  -?   Passive congestion from right-sided heart failure causing acute liver decompensation but has improved, but patient is too high risk to do a liver biopsy currently  -Appreciate recommendations from GI and IR  - Per GI: Serial H&H, advance diet, if he rebleeds, could possibly need a repeat EGD to rule out Dieulafoy lesion of stomach  -Continue pantoprazole, Carafate  -01/08: hb stable around 8 , no bleeding  01/09: Hemoglobin 7.4  01/10: Hemoglobin 6.7, a unit of PRBC given this morning  01/11: Hemoglobin 7.6    Acute hypoxic respiratory failure status post extubation  -Intubated December 28, extubated December 29  Currently on room air      ESRD on hemodialysis, status post renal transplant x2  -Nephrology on board, appreciate the recommendations.  -Continue Retacrit    Heart failure reduced EF, not in exacerbation  -Last EF 35% (November 2021)  -Not currently on a beta-blocker or an ACE inhibitor in the setting of acute illness    History of HIT  -Avoid heparin  -SCDs contraindicated due to PAD     Extensive right lower extremity DVT status post thrombectomy and IVC filter placement  S/p s/p venous thrombectomy and IVC filter placement 1/4  -Vascular on board- appreciate the recommendations   -Duplex dated 12-30-21: Graft 1: left fem-tibial graft  The graft inflow demonstrates <50% stenosis. The proximal anastomosis demonstrates <50% stenosis. The proximal graft demonstrates <50% stenosis. The mid graft demonstrates <50% stenosis. The distal graft demonstrates <50% stenosis. The distal anastomosis demonstrates <50% stenosis. The graft outflow demonstrates severe (>75%) stenosis. The mid, distal and outflow graft flow is monophasic. The inflow and proximal graft flow is triphasic. -PETER 12-30-21: PVR waveforms in the right ankle consistent with moderate disease. PVR waveforms at the right metatarsal region consistent with moderate disease. PVR waveforms in the left ankle consistent with moderate disease. PVR waveforms of the left metatarsal region consistent with severe disease. Unable to obtain left brachial pressure due to AV Fistula. Ankle/metatarsal pressures only, patient has LLE bypass graft. Right DP is non compressible, RLE appears moderate. Left PT is non compressible, LLE appears severe with absent digit waveform and absent pulses. LLE bypass graft appears patent. Left PTA proximal appears occluded. Left JOEY proximal appears monophasic with a velocity of 11 cm/s.    01/11  Repeat right lower extremity Doppler showed persistent clots, some clot occlusive, not.   Reconsulted vascular surgery for input to see if need for repeat thrombectomy  01/10: Patient continues to complain of pain on his left lower extremity associated with swelling which he Thinks is worse than when he came in  We will order repeat lower extremity Doppler to see if there is any new clot post thrombectomy    Deconditioning  -PT/OT    Chronic pain, chronically on opioids  Patient reported being in a lot of pain despite being on oxycodone  Want IV pain medication, explained to him that oral pain medication provide better pain control. Will order p.o. Dilaudid every 8 hours only given if ox Roxicodone does not provide pain relief. We will avoid IV narcotics    Wounds to left second and third great toes  -Continue wound care: wash with soap and water, dry completely. paint with betadine/povidine swabs  Weave a 4x4 between toes. Left arm swelling in the setting of known central venous stenosis  · Left arm swelling- s/p angioplasty of left subclavian vein  -    18.5 - 24.9 Normal weight / Body mass index is 21.77 kg/m². Estimated discharge date: January 4  Barriers:    Code status: Full  Prophylaxis: H2B/PPI  Recommended Disposition: Home w/Family     Subjective:     Chief Complaint / Reason for Physician Visit  Follow-up sepsis/left lower extremity pain  Patient complains of right lower extremity pain and concerned about his wounds on the toes of his left foot  Review of Systems:  Symptom Y/N Comments  Symptom Y/N Comments   Fever/Chills N   Chest Pain N    Poor Appetite N   Edema Y L LEG   Cough N   Abdominal Pain Y Chronic    Sputum N   Joint Pain N    SOB/ZAMUDIO N   Pruritis/Rash N    Nausea/vomit n   Tolerating PT/OT -    Diarrhea N   Tolerating Diet y    Constipation N   Other Y      Could NOT obtain due to:      Objective:     VITALS:   Last 24hrs VS reviewed since prior progress note.  Most recent are:  Patient Vitals for the past 24 hrs:   Temp Pulse Resp BP SpO2   01/11/22 0700 -- 100 21 107/66 92 %   01/11/22 0400 98.7 °F (37.1 °C) 97 19 105/63 94 %   01/11/22 0109 100.2 °F (37.9 °C) -- -- -- --   01/10/22 2246 (!) 101.2 °F (38.4 °C) (!) 111 20 101/61 94 % 01/10/22 2007 (!) 101.4 °F (38.6 °C) (!) 111 20 110/70 96 %   01/10/22 1545 98.3 °F (36.8 °C) (!) 111 19 139/83 96 %   01/10/22 1529 -- (!) 101 19 138/86 90 %   01/10/22 1515 -- 98 17 132/80 96 %   01/10/22 1500 -- (!) 106 17 133/84 96 %   01/10/22 1445 98.3 °F (36.8 °C) (!) 108 16 125/79 100 %   01/10/22 1430 -- 98 21 123/77 100 %   01/10/22 1415 -- (!) 104 17 135/85 (!) 89 %   01/10/22 1400 -- 94 16 108/71 94 %   01/10/22 1345 -- 96 16 119/75 (!) 88 %   01/10/22 1330 -- 97 13 107/71 99 %   01/10/22 1315 -- (!) 104 23 119/84 98 %   01/10/22 1300 -- 96 17 117/75 93 %   01/10/22 1245 -- 97 17 116/80 97 %   01/10/22 1230 -- (!) 105 20 107/68 96 %   01/10/22 1215 98.7 °F (37.1 °C) 98 22 109/73 97 %   01/10/22 1030 98.9 °F (37.2 °C) (!) 108 13 113/70 95 %       Intake/Output Summary (Last 24 hours) at 1/11/2022 0916  Last data filed at 1/10/2022 1937  Gross per 24 hour   Intake 360 ml   Output 2000 ml   Net -1640 ml        I had a face to face encounter and independently examined this patient on 1/11/2022, as outlined below:  PHYSICAL EXAM:  General: Alert, cooperative, no acute distress    EENT:  Anicteric sclerae. MMM  Resp:  Diminished bilaterally, no wheezing or rales  CV:  Regular  rhythm, no murmurs, rubs or gallops  GI:  Soft, Non distended,  \  +Bowel sounds  Neurologic:  Alert and oriented X 3, normal speech,   Psych:   Good insight. Not anxious nor agitated  Skin:  No rashes. No jaundice.  Dressing to LUE fistula CDI  MSK:  Moves all extremities  PVS:   Palpable pulses, left leg and left arm with significant non-pitting edema    Reviewed most current lab test results and cultures  YES  Reviewed most current radiology test results   YES  Review and summation of old records today    NO  Reviewed patient's current orders and MAR    YES  PMH/SH reviewed - no change compared to H&P  ________________________________________________________________________  Care Plan discussed with:    Comments   Patient Y Family      RN Y    Care Manager     Consultant                        Multidiciplinary team rounds were held today with , nursing, pharmacist and clinical coordinator. Patient's plan of care was discussed; medications were reviewed and discharge planning was addressed. ________________________________________________________________________      Comments   >50% of visit spent in counseling and coordination of care Y    ________________________________________________________________________  Bo Junior MD     Procedures: see electronic medical records for all procedures/Xrays and details which were not copied into this note but were reviewed prior to creation of Plan. LABS:  I reviewed today's most current labs and imaging studies.   Pertinent labs include:  Recent Labs     01/11/22  0022 01/10/22  0116 01/09/22  0521   WBC 11.0 14.2* 17.1*   HGB 7.6* 6.7* 7.4*   HCT 23.1* 20.5* 23.3*   * 160 188     Recent Labs     01/11/22  0022 01/10/22  0116 01/09/22  0521   NA  --  132* 134*   K  --  4.3 4.0   CL  --  93* 94*   CO2  --  30 32   GLU  --  100 86   BUN  --  58* 43*   CREA  --  6.53* 5.40*   CA  --  8.5 8.4*   MG 2.2  --   --    PHOS  --  2.8  --    ALB  --  1.4*  --        Signed: Bo Junior MD

## 2022-01-11 NOTE — PROGRESS NOTES
Problem: Mobility Impaired (Adult and Pediatric)  Goal: *Acute Goals and Plan of Care (Insert Text)  Description: FUNCTIONAL STATUS PRIOR TO ADMISSION: Patient was modified independent using a rollator for functional mobility. Patient was independent for basic and instrumental ADLs. HOME SUPPORT PRIOR TO ADMISSION: The patient lived with sister but did not require assist. Lives in second floor duplex with 15 steps to enter home. Physical Therapy Goals  Initiated 1/6/2022  1. Patient will move from supine to sit and sit to supine , scoot up and down, and roll side to side in bed with modified independence within 7 day(s). 2.  Patient will transfer from bed to chair and chair to bed with modified independence using the least restrictive device within 7 day(s). 3.  Patient will perform sit to stand with modified independence within 7 day(s). 4.  Patient will ambulate with supervision/set-up for 90 feet with the least restrictive device within 7 day(s). 5.  Patient will ascend/descend 15 stairs with 1 handrail(s) with minimal assistance/contact guard assist within 7 day(s). 1/11/2022 1541 by Garrick Nicholson, PT  Outcome: Progressing Towards Goal    PHYSICAL THERAPY TREATMENT  Patient: Wolfgang Berumen (61 y.o. male)  Date: 1/11/2022  Diagnosis: GIB (gastrointestinal bleeding) [K92.2] <principal problem not specified>  Procedure(s) (LRB):  RIGHT LEG  VENOUS THROMBECTOMY AND INSERTION OF IVC FILTER (Right) 7 Days Post-Op  Precautions: Fall,Skin,Contact (droplet Plus)  Chart, physical therapy assessment, plan of care and goals were reviewed. ASSESSMENT  Patient continues with skilled PT services and is progressing towards goals. Patient received in bed, on RA and agreeable to participate, reports pain in right LE but willing to get up to chair.   Patient able to come to sit EOB at supervision level and can sit unsupported, then came to stand with min assist x 2 and ambulated short distance to chair, HR increased to 128 with activity. Gait is slow and antalgic with decreased weight bearing on right LE but no overt LOB. Obtained RW and left in room to trial for next visit. Patient left up in chair with call bell in reach, educated on pacing and progression of activity and falls prevention. Patient is well below functional baseline and is a good candidate for inpatient rehab. Current Level of Function Impacting Discharge (mobility/balance): min assist x 2 to ambulate short distance    Other factors to consider for discharge: below indep baseline, falls risk, has 15 steps to enter home         PLAN :  Patient continues to benefit from skilled intervention to address the above impairments. Continue treatment per established plan of care. to address goals. Recommendation for discharge: (in order for the patient to meet his/her long term goals)  Therapy 3 hours per day 5-7 days per week    This discharge recommendation:  Has been made in collaboration with the attending provider and/or case management    IF patient discharges home will need the following DME: rolling walker       SUBJECTIVE:   Patient stated I can't put too much weight on this right leg.     OBJECTIVE DATA SUMMARY:   Critical Behavior:  Neurologic State: Drowsy  Orientation Level: Oriented X4  Cognition: Appropriate decision making,Appropriate for age attention/concentration,Appropriate safety awareness  Safety/Judgement: Awareness of environment,Home safety,Insight into deficits  Functional Mobility Training:  Bed Mobility:     Supine to Sit: Supervision     Scooting: Supervision        Transfers:  Sit to Stand: Minimum assistance  Stand to Sit: Minimum assistance        Bed to Chair: Minimum assistance;Assist x2                    Balance:  Sitting: Intact  Standing: Impaired; With support  Standing - Static: Good  Standing - Dynamic : Constant support;Good  Ambulation/Gait Training:  Distance (ft): 5 Feet (ft)     Ambulation - Level of Assistance: Minimal assistance;Assist x2        Gait Abnormalities: Antalgic;Decreased step clearance              Speed/Bernarda: Slow  Step Length: Left shortened;Right shortened                    Stairs: Activity Tolerance:   Fair and requires rest breaks    After treatment patient left in no apparent distress:   Sitting in chair and Call bell within reach    COMMUNICATION/COLLABORATION:   The patients plan of care was discussed with: Occupational therapist and Registered nurse.      Jah Osman, PT   Time Calculation: 23 mins

## 2022-01-11 NOTE — PROGRESS NOTES
Problem: Falls - Risk of  Goal: *Absence of Falls  Description: Document Alex Gaytan Fall Risk and appropriate interventions in the flowsheet. Outcome: Progressing Towards Goal  Note: Fall Risk Interventions:  Mobility Interventions: Bed/chair exit alarm,Patient to call before getting OOB    Mentation Interventions: Bed/chair exit alarm,Reorient patient    Medication Interventions: Bed/chair exit alarm,Patient to call before getting OOB,Teach patient to arise slowly    Elimination Interventions: Bed/chair exit alarm,Call light in reach,Stay With Me (per policy),Toilet paper/wipes in reach    History of Falls Interventions: Bed/chair exit alarm,Investigate reason for fall,Room close to nurse's station         Problem: Patient Education: Go to Patient Education Activity  Goal: Patient/Family Education  Outcome: Progressing Towards Goal     Problem: Pressure Injury - Risk of  Goal: *Prevention of pressure injury  Description: Document Yong Scale and appropriate interventions in the flowsheet.   Outcome: Progressing Towards Goal  Note: Pressure Injury Interventions:  Sensory Interventions: Assess changes in LOC,Float heels,Keep linens dry and wrinkle-free,Minimize linen layers    Moisture Interventions: Absorbent underpads,Apply protective barrier, creams and emollients,Check for incontinence Q2 hours and as needed    Activity Interventions: Increase time out of bed,Pressure redistribution bed/mattress(bed type)    Mobility Interventions: Float heels,Pressure redistribution bed/mattress (bed type),PT/OT evaluation    Nutrition Interventions: Document food/fluid/supplement intake    Friction and Shear Interventions: Feet elevated on foot rest,Lift sheet,Lift team/patient mobility team,Minimize layers                Problem: Patient Education: Go to Patient Education Activity  Goal: Patient/Family Education  Outcome: Progressing Towards Goal     Problem: Breathing Pattern - Ineffective  Goal: Ability to achieve and maintain a regular respiratory rate  Outcome: Progressing Towards Goal     Problem:  Body Temperature -  Risk of, Imbalanced  Goal: Ability to maintain a body temperature within defined limits  Outcome: Progressing Towards Goal  Goal: Will regain or maintain usual level of consciousness  Outcome: Progressing Towards Goal  Goal: Complications related to the disease process, condition or treatment will be avoided or minimized  Outcome: Progressing Towards Goal     Problem: Isolation Precautions - Risk of Spread of Infection  Goal: Prevent transmission of infectious organism to others  Outcome: Progressing Towards Goal     Problem: Nutrition Deficits  Goal: Optimize nutrtional status  Outcome: Progressing Towards Goal     Problem: Risk for Fluid Volume Deficit  Goal: Maintain normal heart rhythm  Outcome: Progressing Towards Goal  Goal: Maintain absence of muscle cramping  Outcome: Progressing Towards Goal  Goal: Maintain normal serum potassium, sodium, calcium, phosphorus, and pH  Outcome: Progressing Towards Goal     Problem: Loneliness or Risk for Loneliness  Goal: Demonstrate positive use of time alone when socialization is not possible  Outcome: Progressing Towards Goal     Problem: Fatigue  Goal: Verbalize increase energy and improved vitality  Outcome: Progressing Towards Goal     Problem: Patient Education: Go to Patient Education Activity  Goal: Patient/Family Education  Outcome: Progressing Towards Goal     Problem: Patient Education: Go to Patient Education Activity  Goal: Patient/Family Education  Outcome: Progressing Towards Goal     Problem: Patient Education: Go to Patient Education Activity  Goal: Patient/Family Education  Outcome: Progressing Towards Goal     Problem: Anemia Care Plan (Adult and Pediatric)  Goal: *Labs within defined limits  Outcome: Progressing Towards Goal  Goal: *Tolerates increased activity  Outcome: Progressing Towards Goal     Problem: Patient Education: Go to Patient Education Activity  Goal: Patient/Family Education  Outcome: Progressing Towards Goal

## 2022-01-11 NOTE — PROGRESS NOTES
01/10/22 2246   Vital Signs   Temp (!) 101.2 °F (38.4 °C)   Temp Source Oral   Pulse (Heart Rate) (!) 111   Heart Rate Source Monitor   Cardiac Rhythm Sinus Tachy   Resp Rate 20   O2 Sat (%) 94 %   Level of Consciousness Alert (0)   /61   MAP (Calculated) 74   BP 1 Method Automatic   BP 1 Location Right upper arm   BP Patient Position At rest   MEWS Score 5       MEWs score of 5 d/t increased HR and Temp. NP Purveyor notified.

## 2022-01-11 NOTE — PROGRESS NOTES
Vascular    He has RLE edema w/ pain which impairs ambulation  Duplex shows recurrent RLE DVT though less extensive than before  Received 1 unit PRBC yesterday for Hb 6.7  Not surprising that kendall has recurrent DVT since he can not be anticoagulated  He has an IVC filter  Nothing different to do from a vascular standpoint  Elevate Rt leg when possible

## 2022-01-11 NOTE — PROGRESS NOTES
Problem: Mobility Impaired (Adult and Pediatric)  Goal: *Acute Goals and Plan of Care (Insert Text)  Description: FUNCTIONAL STATUS PRIOR TO ADMISSION: Patient was modified independent using a rollator for functional mobility. Patient was independent for basic and instrumental ADLs. HOME SUPPORT PRIOR TO ADMISSION: The patient lived with sister but did not require assist. Lives in second floor duplex with 15 steps to enter home. Physical Therapy Goals  Initiated 1/6/2022  1. Patient will move from supine to sit and sit to supine , scoot up and down, and roll side to side in bed with modified independence within 7 day(s). 2.  Patient will transfer from bed to chair and chair to bed with modified independence using the least restrictive device within 7 day(s). 3.  Patient will perform sit to stand with modified independence within 7 day(s). 4.  Patient will ambulate with supervision/set-up for 90 feet with the least restrictive device within 7 day(s). 5.  Patient will ascend/descend 15 stairs with 1 handrail(s) with minimal assistance/contact guard assist within 7 day(s). Outcome: Progressing Towards Goal   PHYSICAL THERAPY TREATMENT  Patient: Cresencio Turpin (82 y.o. male)  Date: 1/11/2022  Diagnosis: GIB (gastrointestinal bleeding) [K92.2] <principal problem not specified>  Procedure(s) (LRB):  RIGHT LEG  VENOUS THROMBECTOMY AND INSERTION OF IVC FILTER (Right) 7 Days Post-Op  Precautions: Fall,Skin,Contact (droplet Plus)  Chart, physical therapy assessment, plan of care and goals were reviewed. ASSESSMENT  Patient continues with skilled PT services and is progressing towards goals. Patient received in bed and agreeable to participate, on RA and VSS except running low grade temp of 100.9 (RN notified).  Patient was able to come to sit     Current Level of Function Impacting Discharge (mobility/balance): ***    Other factors to consider for discharge: ***         PLAN :  Patient continues to benefit from skilled intervention to address the above impairments. Continue treatment per established plan of care. to address goals. Recommendation for discharge: (in order for the patient to meet his/her long term goals)  {therapy discharge recs PT:27728}    This discharge recommendation:  {therapy discharge collaboration:43573}    IF patient discharges home will need the following DME: {DME Devices:00240}       SUBJECTIVE:   Patient stated ***.    OBJECTIVE DATA SUMMARY:   Critical Behavior:  Neurologic State: Drowsy  Orientation Level: Oriented X4  Cognition: Appropriate decision making,Appropriate for age attention/concentration,Appropriate safety awareness  Safety/Judgement: Awareness of environment,Home safety,Insight into deficits  Functional Mobility Training:  Bed Mobility:     Supine to Sit: Supervision     Scooting: Supervision        Transfers:  Sit to Stand: Minimum assistance  Stand to Sit: Minimum assistance        Bed to Chair: Minimum assistance;Assist x2                    Balance:  Sitting: Intact  Standing: Impaired; With support  Standing - Static: Good  Standing - Dynamic : Constant support;Good  Ambulation/Gait Training:                                                      ***  Stairs: Therapeutic Exercises:   ***  Pain Rating:  ***    Activity Tolerance:   {therapy activity tolerance:59060}    After treatment patient left in no apparent distress:   {AFTER therapy treatment:02240}    COMMUNICATION/COLLABORATION:   The patients plan of care was discussed with: {POC discussed PXMR:74018}.      Reema Trinidad, PT

## 2022-01-11 NOTE — PROGRESS NOTES
01/11/22 1424   Vitals   Temp 99 °F (37.2 °C)   Temp Source Oral   Pulse (Heart Rate) (!) 114   Heart Rate Source Monitor   Resp Rate 22   O2 Sat (%) 97 %   Level of Consciousness Alert (0)   BP (!) 92/57   MAP (Calculated) 69   BP 1 Location Right upper arm   BP 1 Method Automatic   BP Patient Position At rest   Cardiac Rhythm Sinus Tachy   MEWS Score 5   BP low b/c pt is up in chair, will return to bed.  Pt is tachycardiac at baseline MD aware

## 2022-01-11 NOTE — PROGRESS NOTES
Called and spoke with pt explained per Dr. Alonzo Andino that her urine was free from infection so this was not the cause of blood in her urine. Pt should follow up with urology consult as discussed. Pt voiced an understanding.  Silvia Garcia LPN Nephrology Progress Note  Blaise Leblanc     [facility]. [facility]  Phone - [phone]   Patient: Harsha Birch    YOB: 1975        Date- 1/11/2022   Admit Date: 12/26/2021  CC: Follow up for  esrd        IMPRESSION & PLAN:   · ESRD - home HD 5 days per week- Follows up with Dr Gisela Batista at [facility]  · Covid 19 infection  · Right leg ileofemoral DVT -s/p venous thrombectomy and IVC filter placement 1/4  · H/o esophageal bleed from esophagitis  · Left arm swelling- s/p angioplasty of left subclavian vein  · Gram positive sepsis from dental abcess  · s/p lead extraction at [facility]  · Anemia of ckd  · Hx of renal tx in past times 2.  · Hypertension  · CAD  · Failed RTX times 2  · H/o DVT, s/p ivc filter/ h/o HIT      PLAN-   No dialysis TODAY   Continue epogen     Subjective: Interval History:   S/p hd yesterday  S/p PRBC tx yesterday    Objective:   Vitals:    01/10/22 2246 01/11/22 0109 01/11/22 0400 01/11/22 0700   BP: 101/61  105/63 107/66   Pulse: (!) 111  97 100   Resp: 20 19 21   Temp: (!) 101.2 °F (38.4 °C) 100.2 °F (37.9 °C) 98.7 °F (37.1 °C)    TempSrc:       SpO2: 94%  94% 92%   Weight:       Height:          01/10 0701 - 01/11 0700  In: 956.7 [P.O.:600]  Out: 2000   Last 3 Recorded Weights in this Encounter    12/28/21 0400 01/04/22 0839 01/09/22 0605   Weight: 65.3 kg (143 lb 15.4 oz) 65.3 kg (143 lb 15.4 oz) 63 kg (139 lb)      Physical exam:     GEN: nad  NECK- Supple  RESP:no distress  NEURO:non focal      Due to the COVID-19 pandemic, in order to reduce the spread and transmission of the virus, some basic elements of the physical exam have been deferred to reduce direct or close contact with the patient . Chart reviewed. Pertinent Notes reviewed.      Data Review :  Recent Labs     01/11/22  0022 01/10/22  0116 01/09/22  0521   NA  --  132* 134*   K  --  4.3 4.0   CL  --  93* 94*   CO2  --  30 32   BUN  --  58* 43*   CREA  --  6.53* 5.40* GLU  --  100 86   CA  --  8.5 8.4*   MG 2.2  --   --    PHOS  --  2.8  --      Recent Labs     01/11/22  0022 01/10/22  0116 01/09/22  0521   WBC 11.0 14.2* 17.1*   HGB 7.6* 6.7* 7.4*   HCT 23.1* 20.5* 23.3*   * 160 188     No results for input(s): FE, TIBC, PSAT, FERR in the last 72 hours.    Medication list  reviewed  Current Facility-Administered Medications   Medication Dose Route Frequency    0.9% sodium chloride infusion 250 mL  250 mL IntraVENous PRN    acetaminophen (TYLENOL) tablet 650 mg  650 mg Oral Q4H PRN    Or    acetaminophen (TYLENOL) suppository 650 mg  650 mg Rectal Q4H PRN    HYDROmorphone (DILAUDID) tablet 2 mg  2 mg Oral Q8H PRN    piperacillin-tazobactam (ZOSYN) 3.375 g in 0.9% sodium chloride (MBP/ADV) 100 mL MBP  3.375 g IntraVENous Q12H    VANCOMYCIN INFORMATION NOTE   Other Rx Dosing/Monitoring    oxyCODONE IR (ROXICODONE) tablet 10 mg  10 mg Oral Q4H PRN    And    acetaminophen (TYLENOL) tablet 325 mg  325 mg Oral Q4H PRN    sucralfate (CARAFATE) tablet 1 g  1 g Oral AC&HS    pantoprazole (PROTONIX) tablet 40 mg  40 mg Oral ACB&D    alum-mag hydroxide-simeth (MYLANTA) oral suspension 30 mL  30 mL Oral Q4H PRN    balsam peru-castor oiL (VENELEX) ointment   Topical BID    sodium chloride (NS) flush 5-40 mL  5-40 mL IntraVENous Q8H    sodium chloride (NS) flush 5-40 mL  5-40 mL IntraVENous PRN    polyethylene glycol (MIRALAX) packet 17 g  17 g Oral DAILY PRN    ondansetron (ZOFRAN) injection 4 mg  4 mg IntraVENous Q6H PRN    alcohol 62% (NOZIN) nasal  1 Ampule  1 Ampule Topical Q12H    epoetin emilie-epbx (RETACRIT) injection 20,000 Units  20,000 Units SubCUTAneous Q MON, WED & FRI          Atnon Barros MD              [facility]  [facility] / [facility] [address]  [place], [address]  Phone - [phone]               Fax - [phone]

## 2022-01-12 LAB
ALBUMIN SERPL-MCNC: 1.6 G/DL (ref 3.5–5)
ANION GAP SERPL CALC-SCNC: 7 MMOL/L (ref 5–15)
BASOPHILS # BLD: 0.1 K/UL (ref 0–0.1)
BASOPHILS NFR BLD: 1 % (ref 0–1)
BUN SERPL-MCNC: 37 MG/DL (ref 6–20)
BUN/CREAT SERPL: 7 (ref 12–20)
CALCIUM SERPL-MCNC: 8.3 MG/DL (ref 8.5–10.1)
CHLORIDE SERPL-SCNC: 94 MMOL/L (ref 97–108)
CO2 SERPL-SCNC: 32 MMOL/L (ref 21–32)
CREAT SERPL-MCNC: 5.47 MG/DL (ref 0.7–1.3)
DIFFERENTIAL METHOD BLD: ABNORMAL
EOSINOPHIL # BLD: 0.6 K/UL (ref 0–0.4)
EOSINOPHIL NFR BLD: 5 % (ref 0–7)
ERYTHROCYTE [DISTWIDTH] IN BLOOD BY AUTOMATED COUNT: 17.4 % (ref 11.5–14.5)
GLUCOSE SERPL-MCNC: 103 MG/DL (ref 65–100)
HCT VFR BLD AUTO: 25.4 % (ref 36.6–50.3)
HGB BLD-MCNC: 7.9 G/DL (ref 12.1–17)
IMM GRANULOCYTES # BLD AUTO: 0 K/UL (ref 0–0.04)
IMM GRANULOCYTES NFR BLD AUTO: 0 % (ref 0–0.5)
LYMPHOCYTES # BLD: 1.5 K/UL (ref 0.8–3.5)
LYMPHOCYTES NFR BLD: 13 % (ref 12–49)
MCH RBC QN AUTO: 28.7 PG (ref 26–34)
MCHC RBC AUTO-ENTMCNC: 31.1 G/DL (ref 30–36.5)
MCV RBC AUTO: 92.4 FL (ref 80–99)
MONOCYTES # BLD: 0.9 K/UL (ref 0–1)
MONOCYTES NFR BLD: 8 % (ref 5–13)
NEUTS SEG # BLD: 8.1 K/UL (ref 1.8–8)
NEUTS SEG NFR BLD: 73 % (ref 32–75)
NRBC # BLD: 0 K/UL (ref 0–0.01)
NRBC BLD-RTO: 0 PER 100 WBC
PHOSPHATE SERPL-MCNC: 3.3 MG/DL (ref 2.6–4.7)
PLATELET # BLD AUTO: 158 K/UL (ref 150–400)
PMV BLD AUTO: 11.4 FL (ref 8.9–12.9)
POTASSIUM SERPL-SCNC: 3.9 MMOL/L (ref 3.5–5.1)
RBC # BLD AUTO: 2.75 M/UL (ref 4.1–5.7)
SODIUM SERPL-SCNC: 133 MMOL/L (ref 136–145)
VANCOMYCIN SERPL-MCNC: 16.8 UG/ML
WBC # BLD AUTO: 11.2 K/UL (ref 4.1–11.1)

## 2022-01-12 PROCEDURE — 90935 HEMODIALYSIS ONE EVALUATION: CPT

## 2022-01-12 PROCEDURE — 74011250636 HC RX REV CODE- 250/636: Performed by: INTERNAL MEDICINE

## 2022-01-12 PROCEDURE — 65660000001 HC RM ICU INTERMED STEPDOWN

## 2022-01-12 PROCEDURE — 74011000250 HC RX REV CODE- 250: Performed by: SURGERY

## 2022-01-12 PROCEDURE — 36415 COLL VENOUS BLD VENIPUNCTURE: CPT

## 2022-01-12 PROCEDURE — 74011250637 HC RX REV CODE- 250/637: Performed by: NURSE PRACTITIONER

## 2022-01-12 PROCEDURE — 74011250637 HC RX REV CODE- 250/637: Performed by: INTERNAL MEDICINE

## 2022-01-12 PROCEDURE — 74011250637 HC RX REV CODE- 250/637: Performed by: SURGERY

## 2022-01-12 PROCEDURE — 80069 RENAL FUNCTION PANEL: CPT

## 2022-01-12 PROCEDURE — 85025 COMPLETE CBC W/AUTO DIFF WBC: CPT

## 2022-01-12 PROCEDURE — 74011250636 HC RX REV CODE- 250/636: Performed by: SURGERY

## 2022-01-12 PROCEDURE — 74011000258 HC RX REV CODE- 258: Performed by: INTERNAL MEDICINE

## 2022-01-12 PROCEDURE — 2709999900 HC NON-CHARGEABLE SUPPLY

## 2022-01-12 PROCEDURE — 80202 ASSAY OF VANCOMYCIN: CPT

## 2022-01-12 RX ADMIN — Medication: at 15:44

## 2022-01-12 RX ADMIN — SUCRALFATE 1 G: 1 TABLET ORAL at 22:01

## 2022-01-12 RX ADMIN — ACETAMINOPHEN 650 MG: 325 TABLET ORAL at 16:02

## 2022-01-12 RX ADMIN — Medication 1 AMPULE: at 22:03

## 2022-01-12 RX ADMIN — SUCRALFATE 1 G: 1 TABLET ORAL at 13:10

## 2022-01-12 RX ADMIN — OXYCODONE 10 MG: 5 TABLET ORAL at 10:13

## 2022-01-12 RX ADMIN — PIPERACILLIN AND TAZOBACTAM 3.38 G: 3; .375 INJECTION, POWDER, LYOPHILIZED, FOR SOLUTION INTRAVENOUS at 17:47

## 2022-01-12 RX ADMIN — SODIUM CHLORIDE, PRESERVATIVE FREE 10 ML: 5 INJECTION INTRAVENOUS at 22:01

## 2022-01-12 RX ADMIN — PANTOPRAZOLE SODIUM 40 MG: 40 TABLET, DELAYED RELEASE ORAL at 17:47

## 2022-01-12 RX ADMIN — SUCRALFATE 1 G: 1 TABLET ORAL at 10:13

## 2022-01-12 RX ADMIN — PANTOPRAZOLE SODIUM 40 MG: 40 TABLET, DELAYED RELEASE ORAL at 10:13

## 2022-01-12 RX ADMIN — PIPERACILLIN AND TAZOBACTAM 3.38 G: 3; .375 INJECTION, POWDER, LYOPHILIZED, FOR SOLUTION INTRAVENOUS at 04:22

## 2022-01-12 RX ADMIN — SODIUM CHLORIDE, PRESERVATIVE FREE 10 ML: 5 INJECTION INTRAVENOUS at 06:45

## 2022-01-12 RX ADMIN — ACETAMINOPHEN 650 MG: 325 TABLET ORAL at 06:45

## 2022-01-12 RX ADMIN — HYDROMORPHONE HYDROCHLORIDE 2 MG: 2 TABLET ORAL at 04:22

## 2022-01-12 RX ADMIN — SUCRALFATE 1 G: 1 TABLET ORAL at 17:47

## 2022-01-12 RX ADMIN — VANCOMYCIN HYDROCHLORIDE 1000 MG: 1 INJECTION, POWDER, LYOPHILIZED, FOR SOLUTION INTRAVENOUS at 15:44

## 2022-01-12 RX ADMIN — EPOETIN ALFA-EPBX 20000 UNITS: 20000 INJECTION, SOLUTION INTRAVENOUS; SUBCUTANEOUS at 22:01

## 2022-01-12 RX ADMIN — ALUMINUM HYDROXIDE, MAGNESIUM HYDROXIDE, AND SIMETHICONE 30 ML: 200; 200; 20 SUSPENSION ORAL at 15:44

## 2022-01-12 RX ADMIN — OXYCODONE 10 MG: 5 TABLET ORAL at 16:02

## 2022-01-12 RX ADMIN — SODIUM CHLORIDE, PRESERVATIVE FREE 10 ML: 5 INJECTION INTRAVENOUS at 15:45

## 2022-01-12 RX ADMIN — HYDROMORPHONE HYDROCHLORIDE 2 MG: 2 TABLET ORAL at 13:10

## 2022-01-12 RX ADMIN — OXYCODONE 10 MG: 5 TABLET ORAL at 00:13

## 2022-01-12 RX ADMIN — HYDROMORPHONE HYDROCHLORIDE 2 MG: 2 TABLET ORAL at 22:01

## 2022-01-12 NOTE — PROGRESS NOTES
Nephrology Progress Note  Blaise Leblanc     [facility]. [facility]  Phone - [phone]   Patient: Saturnino Day    YOB: 1975        Date- 1/12/2022   Admit Date: 12/26/2021  CC: Follow up for esrd        IMPRESSION & PLAN:   · ESRD - home HD 5 days per week- Follows up with Dr Corey Dean at [facility]  · Covid 19 infection  · Right leg ileofemoral DVT -s/p venous thrombectomy and IVC filter placement 1/4  · H/o esophageal bleed from esophagitis  · Left arm swelling- s/p angioplasty of left subclavian vein  · Gram positive sepsis from dental abcess  · s/p lead extraction at [facility]  · Anemia of ckd  · Hx of renal tx in past times 2.  · Hypertension  · CAD  · Failed RTX times 2  · H/o DVT, s/p ivc filter/ h/o HIT      PLAN-   Seen on dialysis today   Continue epogen  D/w hd nurse   Subjective: Interval History:   bp stable  Seen on hd today    Objective:   Vitals:    01/12/22 0930 01/12/22 0945 01/12/22 1000 01/12/22 1030   BP: 102/69 101/72 (!) 97/58 121/77   Pulse: 93 89 90 90   Resp: 17 19 19 20   Temp:       TempSrc:       SpO2: 100% 100% 100% 93%   Weight:       Height:          01/11 0701 - 01/12 0700  In: 780 [P.O.:680; I.V.:100]  Out: -   Last 3 Recorded Weights in this Encounter    01/04/22 0839 01/09/22 0605 01/12/22 0629   Weight: 65.3 kg (143 lb 15.4 oz) 63 kg (139 lb) 68.2 kg (150 lb 6.4 oz)      Physical exam:     GEN: NAD  NECK- Supple  RESP: no distress  NEURO:non focal      Due to the COVID-19 pandemic, in order to reduce the spread and transmission of the virus, some basic elements of the physical exam have been deferred to reduce direct or close contact with the patient . Chart reviewed. Pertinent Notes reviewed.      Data Review :  Recent Labs     01/12/22  0020 01/11/22  0022 01/10/22  0116   *  --  132*   K 3.9  --  4.3   CL 94*  --  93*   CO2 32  --  30   BUN 37*  --  58*   CREA 5.47*  --  6.53*   *  --  100   CA 8.3*  --  8.5 MG  --  2.2  --    PHOS 3.3  --  2.8     Recent Labs     01/12/22  0020 01/11/22  0022 01/10/22  0116   WBC 11.2* 11.0 14.2*   HGB 7.9* 7.6* 6.7*   HCT 25.4* 23.1* 20.5*    149* 160     No results for input(s): FE, TIBC, PSAT, FERR in the last 72 hours.    Medication list  reviewed  Current Facility-Administered Medications   Medication Dose Route Frequency    Vancomycin random level 1/12 with AM labs   Other ONCE    0.9% sodium chloride infusion 250 mL  250 mL IntraVENous PRN    acetaminophen (TYLENOL) tablet 650 mg  650 mg Oral Q4H PRN    Or    acetaminophen (TYLENOL) suppository 650 mg  650 mg Rectal Q4H PRN    HYDROmorphone (DILAUDID) tablet 2 mg  2 mg Oral Q8H PRN    piperacillin-tazobactam (ZOSYN) 3.375 g in 0.9% sodium chloride (MBP/ADV) 100 mL MBP  3.375 g IntraVENous Q12H    VANCOMYCIN INFORMATION NOTE   Other Rx Dosing/Monitoring    oxyCODONE IR (ROXICODONE) tablet 10 mg  10 mg Oral Q4H PRN    And    acetaminophen (TYLENOL) tablet 325 mg  325 mg Oral Q4H PRN    sucralfate (CARAFATE) tablet 1 g  1 g Oral AC&HS    pantoprazole (PROTONIX) tablet 40 mg  40 mg Oral ACB&D    alum-mag hydroxide-simeth (MYLANTA) oral suspension 30 mL  30 mL Oral Q4H PRN    balsam peru-castor oiL (VENELEX) ointment   Topical BID    sodium chloride (NS) flush 5-40 mL  5-40 mL IntraVENous Q8H    sodium chloride (NS) flush 5-40 mL  5-40 mL IntraVENous PRN    polyethylene glycol (MIRALAX) packet 17 g  17 g Oral DAILY PRN    ondansetron (ZOFRAN) injection 4 mg  4 mg IntraVENous Q6H PRN    alcohol 62% (NOZIN) nasal  1 Ampule  1 Ampule Topical Q12H    epoetin emilie-epbx (RETACRIT) injection 20,000 Units  20,000 Units SubCUTAneous Q MON, WED & FRI          Payette MD Elicia              [facility]  [facility] / [facility]  Lizette Jerez [address]  [place], [address]  Phone - [phone]               Fax - [phone]

## 2022-01-12 NOTE — PROGRESS NOTES
Problem: Falls - Risk of  Goal: *Absence of Falls  Description: Document Clearence Skates Fall Risk and appropriate interventions in the flowsheet. Outcome: Progressing Towards Goal  Note: Fall Risk Interventions:  Mobility Interventions: Bed/chair exit alarm,Communicate number of staff needed for ambulation/transfer,Patient to call before getting OOB    Mentation Interventions: Bed/chair exit alarm,More frequent rounding    Medication Interventions: Bed/chair exit alarm,Patient to call before getting OOB,Teach patient to arise slowly    Elimination Interventions: Bed/chair exit alarm,Call light in reach,Patient to call for help with toileting needs    History of Falls Interventions: Bed/chair exit alarm,Room close to nurse's station         Problem: Patient Education: Go to Patient Education Activity  Goal: Patient/Family Education  Outcome: Progressing Towards Goal     Problem: Pressure Injury - Risk of  Goal: *Prevention of pressure injury  Description: Document Yong Scale and appropriate interventions in the flowsheet.   Outcome: Progressing Towards Goal  Note: Pressure Injury Interventions:  Sensory Interventions: Assess changes in LOC,Assess need for specialty bed,Float heels,Keep linens dry and wrinkle-free    Moisture Interventions: Absorbent underpads,Apply protective barrier, creams and emollients    Activity Interventions: Increase time out of bed,Pressure redistribution bed/mattress(bed type)    Mobility Interventions: HOB 30 degrees or less,Pressure redistribution bed/mattress (bed type)    Nutrition Interventions: Document food/fluid/supplement intake    Friction and Shear Interventions: HOB 30 degrees or less,Lift sheet                Problem: Patient Education: Go to Patient Education Activity  Goal: Patient/Family Education  Outcome: Progressing Towards Goal     Problem: Breathing Pattern - Ineffective  Goal: Ability to achieve and maintain a regular respiratory rate  Outcome: Progressing Towards Goal Problem:  Body Temperature -  Risk of, Imbalanced  Goal: Ability to maintain a body temperature within defined limits  Outcome: Progressing Towards Goal  Goal: Will regain or maintain usual level of consciousness  Outcome: Progressing Towards Goal  Goal: Complications related to the disease process, condition or treatment will be avoided or minimized  Outcome: Progressing Towards Goal     Problem: Risk for Fluid Volume Deficit  Goal: Maintain normal heart rhythm  Outcome: Progressing Towards Goal  Goal: Maintain absence of muscle cramping  Outcome: Progressing Towards Goal  Goal: Maintain normal serum potassium, sodium, calcium, phosphorus, and pH  Outcome: Progressing Towards Goal

## 2022-01-12 NOTE — PROGRESS NOTES
Chart reviewed. Patient currently in HD. PT will follow up later today if possible due to heavy caseload.     Jose Mckeon, PT

## 2022-01-12 NOTE — PROGRESS NOTES
TRANSFER - OUT REPORT:    Verbal report given to Blas Ha RN on Martin Corrales  being transferred to [facility](unit) for ordered procedure       Report consisted of patients Situation, Background, Assessment and   Recommendations(SBAR). Information from the following report(s) Kardex was reviewed with the receiving nurse. Opportunity for questions and clarification was provided.       Patient transported with:   hd at bedside

## 2022-01-12 NOTE — PROGRESS NOTES
Pharmacy Antimicrobial Kinetic Dosing    Indication for Antimicrobials: Sepsis of unknown etiology     Current Regimen of Each Antimicrobial:  Vancomycin 1000 mg IV post-HD (Start Date ; Day 5)  Pip-Tazo 3.375 g IV Q12H (Start Date ; Day 5)    Previous Antimicrobial Therapy:      Goal Level: VANCOMYCIN TROUGH GOAL RANGE    Vancomycin Trough: 20 - 25 mcg/mL    Date Dose & Interval Measured (mcg/mL) Predicted AUC/ZECHARIAH    5750 mg post-HD 16.8                  Date & time of next level:      Significant Positive Cultures:    Blood - NG    Conditions for Dosing Consideration: Hemodialysis    Labs:  Recent Labs     01/12/22  0020 01/10/22  0116   CREA 5.47* 6.53*   BUN 37* 58*     Recent Labs     01/12/22  0020 01/11/22  0022 01/10/22  0116   WBC 11.2* 11.0 14.2*     Temp (24hrs), Av.5 °F (37.5 °C), Min:99 °F (37.2 °C), Max:99.7 °F (37.6 °C)        Creatinine Clearance (mL/min):   CrCl (Ideal Body Weight): 16.1   If actual weight < IBW: CrCl (Actual Body Weight) 16.6    Impression/Plan:   Vancomycin dosed for HD  Random level of 16.8 is subtherapeutic. Increase dose to 1000 mg post-HD  Change Pip/Tazo dose to 3.375 g IV Q12H  Antimicrobial stop date TBD     Pharmacy will follow daily and adjust medications as appropriate for renal function and/or serum levels.     Thank you,  Elisabeth Johnson, Pharmacist

## 2022-01-12 NOTE — PROGRESS NOTES
01/12/22 0227   Vital Signs   Temp 99.7 °F (37.6 °C)   Temp Source Oral   Pulse (Heart Rate) (!) 105   Heart Rate Source Monitor   Cardiac Rhythm Sinus Tachy   Resp Rate 26   O2 Sat (%) 99 %   Level of Consciousness Alert (0)   /70   MAP (Calculated) 82   BP 1 Method Automatic   BP 1 Location Right upper arm   BP Patient Position At rest   MEWS Score 3   Oxygen Therapy   O2 Device None (Room air)     MEWS of 3 no intervention needed. Patient assessed and charge nurse aware.

## 2022-01-12 NOTE — PROGRESS NOTES
Hospitalist Progress Note    NAME: Cresencio Turpin   :  1975   MRN:  [id number]       Assessment / Plan: This is a 81-year-old male with a history of dilated, end-stage renal disease, on hemodialysis , Streptococcus intermedius bacteremia in 2021, status post IV thank stopped 2021, gastric varices with upper GI bleed, thrombocytopenia, transaminitis, previous Covid pneumonia, chronically anticoagulated with warfarin for recurrent DVT, acute on chronic systolic heart failure with an EF of 35%, history of V. tach status post AICD, CAD, severe pulmonary hypertension who presented to the emergency room  with complaints of epigastric abdominal pain and vomiting throughout the day. He was discharged  for the same symptoms having received an EGD with banding of esophageal varices as well as COVID-19 pneumonia. Patient had an unwitnessed fall in the emergency room. He received CT of the head and C-spine both of which were negative for any acute process. He was admitted to the ICU for management of an upper GI bleed and intubated. He transferred out of the ICU . Covid 19 positive   Fevers, recurrent   Severe Sepsis of unknown origin  Hypotension   Patient spiked fever overnight,  highest 102.8, was tachycardic with heart rate between 101-120. Sepsis protocol initiated  Lactic acid 2.5, repeat lactic acid until his resolution. Lactic acid resolved after IV fluid  ivf bolus 250 cc, and albumin  Continue with IV antibiotics cefepime and vancomycin  chest x-ray negative for pneumonia  Blood cultures negative to date  Continues to spike fever, had fever of 10 2 in the morning  COVID test came back positive, patient is not hypoxic. We will hold off giving steroid  :  Patient continues to spike fevers today spiked a fever of 102. He reported no symptoms  WBC trending down  Continue IV antibiotics.   Blood culture negative to date  01/11  Patient spiked fever this morning 100.9, WBC trending down  Continue Vanco and Zosyn  01/10: Patient is afebrile today, blood pressure within normal limits  WBC trending down but hemoglobin 6.7. Blood culture negative to date. Hemorrhagic shock secondary to acute blood loss anemia, complicated by anemia of CKD and thrombocytopenia, secondary to upper GI bleed, bleeding gastric varices  Acute on chronic anemia   -Status post 13 units of PRBCs, 3 units of plasma, 2 units of cryoprecipitate, 3 units of platelets, pressors, octreotide drip, PPI  -Status post 4 EGDs, most recent EGD here December 26, which showed 2 healing esophageal ulcers without stigmata, large gastric varices in the stomach, with 1 gastric varices oozing blood status post epinephrine injection, 2 hemoclips. -Was felt to not be a candidate for TIPS due to severe right heart failure  -He was reintubated December 28 for IR evaluation for BRTO December 28, but he was found to have a large gastric renal shunt which would deem the procedure to the unlikely of any benefit, if even possible, making TIPS the only viable option  -TIPS procedure attempted 12/30 but his portal pressures were not consistent with portal hypertension, therefore he was not a candidate  -Liver was found to be normal on ultrasound and CT was negative for signs of cirrhosis  -?   Passive congestion from right-sided heart failure causing acute liver decompensation but has improved, but patient is too high risk to do a liver biopsy currently  -Appreciate recommendations from GI and IR  - Per GI: Serial H&H, advance diet, if he rebleeds, could possibly need a repeat EGD to rule out Dieulafoy lesion of stomach  -Continue pantoprazole, Carafate  -01/08: hb stable around 8 , no bleeding  01/09: Hemoglobin 7.4  01/10: Hemoglobin 6.7, a unit of PRBC given this morning  01/11: Hemoglobin 7.6    Acute hypoxic respiratory failure status post extubation  -Intubated December 28, extubated December 29  Currently on room air      ESRD on hemodialysis, status post renal transplant x2  -Nephrology on board, appreciate the recommendations.  -Continue Retacrit    Heart failure reduced EF, not in exacerbation  -Last EF 35% (November 2021)  -Not currently on a beta-blocker or an ACE inhibitor in the setting of acute illness    History of HIT  -Avoid heparin  -SCDs contraindicated due to PAD     Extensive right lower extremity DVT status post thrombectomy and IVC filter placement  S/p s/p venous thrombectomy and IVC filter placement 1/4  -Vascular on board- appreciate the recommendations   -Duplex dated 12-30-21: Graft 1: left fem-tibial graft  The graft inflow demonstrates <50% stenosis. The proximal anastomosis demonstrates <50% stenosis. The proximal graft demonstrates <50% stenosis. The mid graft demonstrates <50% stenosis. The distal graft demonstrates <50% stenosis. The distal anastomosis demonstrates <50% stenosis. The graft outflow demonstrates severe (>75%) stenosis. The mid, distal and outflow graft flow is monophasic. The inflow and proximal graft flow is triphasic. -PETER 12-30-21: PVR waveforms in the right ankle consistent with moderate disease. PVR waveforms at the right metatarsal region consistent with moderate disease. PVR waveforms in the left ankle consistent with moderate disease. PVR waveforms of the left metatarsal region consistent with severe disease. Unable to obtain left brachial pressure due to AV Fistula. Ankle/metatarsal pressures only, patient has LLE bypass graft. Right DP is non compressible, RLE appears moderate. Left PT is non compressible, LLE appears severe with absent digit waveform and absent pulses. LLE bypass graft appears patent. Left PTA proximal appears occluded.  Left JOEY proximal appears monophasic with a velocity of 11 cm/s.  01/12: Reviewed vascular surgery input, no indication for further thrombectomy, patient will continue the blood clots ,has IVC filter placed  01/11  Repeat right lower extremity Doppler showed persistent clots, some clot occlusive, not. Reconsulted vascular surgery for input to see if need for repeat thrombectomy  01/10: Patient continues to complain of pain on his left lower extremity associated with swelling which he Thinks is worse than when he came in  We will order repeat lower extremity Doppler to see if there is any new clot post thrombectomy    Deconditioning  -PT/OT    Chronic pain, chronically on opioids  Patient reported being in a lot of pain despite being on oxycodone  Want IV pain medication, explained to him that oral pain medication provide better pain control. c/w p.o. Dilaudid every 8 hours only given if ox Roxicodone does not provide pain relief. We will avoid IV narcotics    Wounds to left second and third great toes  -Continue wound care: wash with soap and water, dry completely. paint with betadine/povidine swabs  Weave a 4x4 between toes. Left arm swelling in the setting of known central venous stenosis  · Left arm swelling- s/p angioplasty of left subclavian vein  -    18.5 - 24.9 Normal weight / Body mass index is 23.56 kg/m².     Estimated discharge date: January 4  Barriers:    Code status: Full  Prophylaxis: H2B/PPI  Recommended Disposition: Home w/Family     Subjective:     Chief Complaint / Reason for Physician Visit  Follow-up sepsis/left lower extremity pain  Patient complains of right lower extremity pain and concerned about his wounds on the toes of his left foot  Review of Systems:  Symptom Y/N Comments  Symptom Y/N Comments   Fever/Chills N   Chest Pain N    Poor Appetite N   Edema Y L LEG   Cough N   Abdominal Pain Y Chronic    Sputum N   Joint Pain N    SOB/ZAMUDIO N   Pruritis/Rash N    Nausea/vomit n   Tolerating PT/OT -    Diarrhea N   Tolerating Diet y    Constipation N   Other Y      Could NOT obtain due to:      Objective:     VITALS:   Last 24hrs VS reviewed since prior progress note. Most recent are:  Patient Vitals for the past 24 hrs:   Temp Pulse Resp BP SpO2   01/12/22 1526 (!) 101.2 °F (38.4 °C) 100 17 128/84 98 %   01/12/22 1115 98.5 °F (36.9 °C) (!) 103 20 116/71 100 %   01/12/22 1103 -- 84 18 108/70 100 %   01/12/22 1045 -- 84 17 106/74 90 %   01/12/22 1030 -- 86 21 121/77 96 %   01/12/22 1015 -- 95 21 106/66 97 %   01/12/22 1000 -- 90 19 (!) 97/58 100 %   01/12/22 0945 -- 89 19 101/72 100 %   01/12/22 0930 -- 93 17 102/69 100 %   01/12/22 0915 -- 96 20 124/76 98 %   01/12/22 0900 -- (!) 101 18 118/71 99 %   01/12/22 0845 -- (!) 109 18 (!) 129/94 98 %   01/12/22 0830 -- 99 16 124/88 100 %   01/12/22 0815 -- (!) 102 20 125/82 99 %   01/12/22 0800 -- 91 18 105/78 96 %   01/12/22 0745 99.6 °F (37.6 °C) (!) 101 24 125/86 100 %   01/12/22 0739 99.6 °F (37.6 °C) (!) 101 24 125/86 100 %   01/12/22 0724 -- (!) 102 15 125/86 100 %   01/12/22 0227 99.7 °F (37.6 °C) (!) 105 26 106/70 99 %   01/11/22 2315 99.6 °F (37.6 °C) (!) 101 16 109/89 97 %   01/11/22 1941 99.2 °F (37.3 °C) 99 20 106/73 100 %       Intake/Output Summary (Last 24 hours) at 1/12/2022 1724  Last data filed at 1/12/2022 1115  Gross per 24 hour   Intake 480 ml   Output 2500 ml   Net -2020 ml        I had a face to face encounter and independently examined this patient on 1/12/2022, as outlined below:  PHYSICAL EXAM:  General: Alert, cooperative, no acute distress    EENT:  Anicteric sclerae. MMM  Resp:  Diminished bilaterally, no wheezing or rales  CV:  Regular  rhythm, no murmurs, rubs or gallops  GI:  Soft, Non distended,  \  +Bowel sounds  Neurologic:  Alert and oriented X 3, normal speech,   Psych:   Good insight. Not anxious nor agitated  Skin:  No rashes. No jaundice.  Dressing to LUE fistula CDI  MSK:  Moves all extremities  PVS:   Palpable pulses, left leg and left arm with significant non-pitting edema    Reviewed most current lab test results and cultures  YES  Reviewed most current radiology test results YES  Review and summation of old records today    NO  Reviewed patient's current orders and MAR    YES  PMH/SH reviewed - no change compared to H&P  ________________________________________________________________________  Care Plan discussed with:    Comments   Patient [address]     Consultant                        Multidiciplinary team rounds were held today with , nursing, pharmacist and clinical coordinator. Patient's plan of care was discussed; medications were reviewed and discharge planning was addressed. ________________________________________________________________________      Comments   >50% of visit spent in counseling and coordination of care Y    ________________________________________________________________________  Jesus Brewer MD     Procedures: see electronic medical records for all procedures/Xrays and details which were not copied into this note but were reviewed prior to creation of Plan. LABS:  I reviewed today's most current labs and imaging studies.   Pertinent labs include:  Recent Labs     01/12/22  0020 01/11/22  0022 01/10/22  0116   WBC 11.2* 11.0 14.2*   HGB 7.9* 7.6* 6.7*   HCT 25.4* 23.1* 20.5*    149* 160     Recent Labs     01/12/22  0020 01/11/22  0022 01/10/22  0116   *  --  132*   K 3.9  --  4.3   CL 94*  --  93*   CO2 32  --  30   *  --  100   BUN 37*  --  58*   CREA 5.47*  --  6.53*   CA 8.3*  --  8.5   MG  --  2.2  --    PHOS 3.3  --  2.8   ALB 1.6*  --  1.4*       Signed: Jesus Brewer MD

## 2022-01-12 NOTE — PROGRESS NOTES
1900 Bedside shift change report given to [address] (oncoming nurse) by Pamela Hunt (offgoing nurse). Report included the following information SBAR, Kardex, Intake/Output, MAR and Recent Results. End of Shift Note    Bedside shift change report given to [address] Rocha (oncoming nurse) by Haleigh Wan RN (offgoing nurse). Report included the following information SBAR, Kardex, Intake/Output, MAR and Recent Results    Shift worked:  Night     Shift summary and any significant changes:    2330 Night supervisor Jasiel Weber came to put in cath flow. When she got here she wanted me to message the NP to make sure that we should give it. She questioned it d/t the other ports working and having blood return. Her suggestion is to wait till tomorrow and let vascular access look at it. NP wants to wait on the cath flow and get the vascular access team to look at the line today. Patient wanted to stay in chair through the night. Concerns for physician to address:    Zone phone for oncoming shift:       Activity:  Activity Level: Up with Assistance  Number times ambulated in hallways past shift: 0  Number of times OOB to chair past shift: 0    Cardiac:   Cardiac Monitoring: Yes      Cardiac Rhythm: Sinus Rhythm    Access:   Current line(s): central line     Genitourinary:   Urinary status: anuric    Respiratory:   O2 Device: None (Room air)  Chronic home O2 use?: NO  Incentive spirometer at bedside: YES     GI:  Last Bowel Movement Date: 01/11/21  Current diet:  ADULT ORAL NUTRITION SUPPLEMENT Breakfast, Lunch, Dinner; Renal Supplement  ADULT DIET Regular; Low Potassium (Less than 3000 mg/day); Low Phosphorus (Less than 1000 mg); Buttered pecan Nepro please! Passing flatus: YES  Tolerating current diet: YES       Pain Management:   Patient states pain is manageable on current regimen: YES    Skin:  Yong Score: 19  Interventions: float heels and increase time out of bed    Patient Safety:  Fall Score:  Total Score: 5  Interventions: bed/chair alarm, assistive device (walker, cane, etc), gripper socks and pt to call before getting OOB  High Fall Risk: Yes    Length of Stay:  Expected LOS: 4d 9h  Actual LOS: [address], RN

## 2022-01-12 NOTE — PROGRESS NOTES
TRANSFER - IN REPORT:    Verbal report received from Sammy Galvan on Harsha Pillow  being received from [facility](unit) for ordered procedure      Report consisted of patients Situation, Background, Assessment and   Recommendations(SBAR). Information from the following report(s) Kardex was reviewed with the receiving nurse. Opportunity for questions and clarification was provided. Assessment completed upon patients arrival to unit and care assumed.

## 2022-01-12 NOTE — PROGRESS NOTES
01/12/22 1526   Vitals   Temp (!) 101.2 °F (38.4 °C)   Temp Source Oral   Pulse (Heart Rate) 100   Heart Rate Source Monitor   Resp Rate 17   O2 Sat (%) 98 %   Level of Consciousness Alert (0)   /84   MAP (Monitor) 98   MAP (Calculated) 99   BP 1 Location Right arm   BP 1 Method Automatic   BP Patient Position At rest   MEWS Score 3   Box Number hw   Electrodes Replaced No   Pain 1   Pain Scale 1 Numeric (0 - 10)   Pain Intensity 1 7   Patient Stated Pain Goal 4   Pain Reassessment 1 Yes   Pain Location 1 Leg   Pain Orientation 1 Right   Pain Description 1 Aching;Constant   Pain Intervention(s) 1 Position; Rest   Will notify MD and charge nurse.   Will continue to monitor patient

## 2022-01-12 NOTE — PROGRESS NOTES
Transition of Care Plan:     RUR: 30%   Disposition: [facility](accepted (waiting on bed availability)  Follow up appointments: Follow up with PCP and/or Specialist   DME needed: N/A  Transportation at Discharge: Family to assist   [place] or means to access home: Family will provide        IM Medicare Letter: 2nd IM Medicare Letter to be given   Is patient a BCPI-A Bundle:   N/A                  If yes, was Bundle Letter given?:    Is patient a [place] and connected with the [facility]? N/A  If yes, was [place] transfer form completed and [facility] notified? Caregiver Contact: Kinsey Tariq (sister) [phone] or Zeb Duggan (family member) [phone]  Discharge Caregiver contacted prior to discharge?  Family to be contacted    1:09pm-  called [facility] [phone] via phone to inquired about placement. Pamella stated that pt was accepted and that they do not have any beds available. Pamella stated that she will put pt on the list for tomorrow but cannot guarantee a bed available tomorrow. 12:50pm- CM called pt's room due to pt being on isolation precautions due to Covid to discuss d/c plan. CM inquired with pt about placement and where he wanted to go as far as [facility]. Pt stated that he was accepted to [facility] and would like to go to there. CM will continue to follow patient for discharge planning needs and arrange for services as deemed necessary.     Tata Navarro [address] [place] [address]  [phone]

## 2022-01-12 NOTE — PROGRESS NOTES
Pharmacy Antimicrobial Kinetic Dosing    Indication for Antimicrobials: Sepsis of unknown etiology     Current Regimen of Each Antimicrobial:  Vancomycin 1000 mg IV post-HD (Start Date ; Day 5)  Pip-Tazo 3.375 g IV Q12H (Start Date ; Day 5)    Previous Antimicrobial Therapy:      Goal Level: VANCOMYCIN TROUGH GOAL RANGE    Vancomycin Trough: 20 - 25 mcg/mL    Date Dose & Interval Measured (mcg/mL) Predicted AUC/ZECHARIAH    5750 mg post-HD 16.8                  Date & time of next level:      Significant Positive Cultures:    Blood - NG    Conditions for Dosing Consideration: Hemodialysis    Labs:  Recent Labs     01/12/22  0020 01/10/22  0116   CREA 5.47* 6.53*   BUN 37* 58*     Recent Labs     01/12/22  0020 01/11/22  0022 01/10/22  0116   WBC 11.2* 11.0 14.2*     Temp (24hrs), Av.5 °F (37.5 °C), Min:99 °F (37.2 °C), Max:99.7 °F (37.6 °C)        Creatinine Clearance (mL/min):   CrCl (Ideal Body Weight): 16.1   If actual weight < IBW: CrCl (Actual Body Weight) 16.6    Impression/Plan:   Vancomycin dosed for HD  Random level of 16.8 is subtherapeutic. Increase dose to 1000 mg post-HD  Change Pip/Tazo dose to 3.375 g IV Q12H  Antimicrobial stop date TBD     Pharmacy will follow daily and adjust medications as appropriate for renal function and/or serum levels.     Thank you,  Ana Maria Crenshaw, PHARMD    Vancomycin Dosing Document    Documents located on pharmacy Teams site: Clinical Practice -> Antimicrobial Stewardship -> Antibiotics_Vancomycin     Aminoglycoside Dosing Document    Documents located on pharmacy Teams site: Clinical Practice -> Antimicrobial Stewardship -> Antibiotics_Aminoglycosides

## 2022-01-12 NOTE — PROCEDURES
Hemodialysis / [phone]    Vitals Pre Post Assessment Pre Post   BP BP: 105/78 (01/12/22 0800) 116/71 LOC A & O x 4 No Change   HR Pulse (Heart Rate): 91 (01/12/22 0800) 86 Lungs clear No change   Resp Resp Rate: 18 (01/12/22 0800) 20 Cardiac S1S2 No change   Temp Temp: 99.6 °F (37.6 °C) (01/12/22 0745) 98.6 oral Skin Warm dry & intact No change   Weight Pre-Dialysis Weight: 65.3 kg (143 lb 15.4 oz) (01/07/22 1700) stretcher Edema +4 Lower ext No change   Tele status yes yes Pain Pain Intensity 1: 4 (01/12/22 0520) No change     Orders   Duration: Start: 0745 End: 1115 Total: 3.5   Dialyzer: Dialyzer/Set Up Inspection: Revaclear (01/12/22 0745)   K Bath: Dialysate K (mEq/L): 2 (01/12/22 0745)   Ca Bath: Dialysate CA (mEq/L): 3.0 (01/12/22 0745)   Na: Dialysate NA (mEq/L): 138 (01/12/22 0745)   Bicarb: Dialysate HCO3 (mEq/L): 40 (01/12/22 0745)   Target Fluid Removal: Goal/Amount of Fluid to Remove (mL): 2000 mL (01/12/22 0745)     Access   AVG   Type & Location: Left upper arm AVG   Comments:       + thrill & bruit pre and post HD. Cannulated x 2 with 15g needles.  Blood flow 400-450                                 Labs   HBsAg (Antigen) / date:    Neg 01/07/22                                           HBsAb (Antibody) / date: Imm 01/07/22   Source: epic   Obtained/Reviewed  Critical Results Called HGB   Date Value Ref Range Status   01/12/2022 7.9 (L) 12.1 - 17.0 g/dL Final     Potassium   Date Value Ref Range Status   01/12/2022 3.9 3.5 - 5.1 mmol/L Final     Calcium   Date Value Ref Range Status   01/12/2022 8.3 (L) 8.5 - 10.1 MG/DL Final     BUN   Date Value Ref Range Status   01/12/2022 37 (H) 6 - 20 MG/DL Final     Comment:     INVESTIGATED PER DELTA CHECK PROTOCOL     Creatinine   Date Value Ref Range Status   01/12/2022 5.47 (H) 0.70 - 1.30 MG/DL Final        Meds Given   Name Dose Route                    Adequacy / Fluid    Total Liters Process: 79.8   Net Fluid Removed: 2500ml      Comments   Time Out Done:   (Time) 0715   Admitting Diagnosis: · ESRD -   · Covid 19 infection  · Right leg ileofemoral DVT -s/p venous thrombectomy and IVC filter placement 1/4  · H/o esophageal bleed from esophagitis   Consent obtained/signed: Informed Consent Verified: Yes (01/12/22 0745)   Machine / RO # Machine Number: [id number] (01/12/22 0745)   Primary Nurse Rpt Pre: Sandra Tomas RN   Primary Nurse Rpt Post: Antonio Gutierrez RN   Pt Education: Access care   Care Plan: Continue HD   Pts outpatient clinic: Home HD     Tx Summary SUZANNE AVF: skin CDI. No s/s of infection. + B/T. No issues with cannulation or hemostasis. Running well at . I arrived to pt's room A&Ox4. Consent signed & on file. SBAR received from Primary RN. 0745: Pt cannulated with 75K needles per policy & without issue. Labs drawn per request/ order. VSS. Dialysis Tx initiated. 0815: Pt resting quietly. 0845: pt. Resting well. Lines secure  0900: pt. Jules. Hd well. .  0915: pt. Stable, lines secure and visible  0945; pt. Stable,lines secure  1015: pt. Stable, resting well. 1045: pt. Stable, lines secure  1115: Tx ended. VSS. All possible blood returned to patient. Hemostasis achieved without issue. Bed locked and in the lowest position, call bell and belongings in reach. SBAR given to Primary, RN. Patient is stable at time of their/ my departure. All Dialysis related medications have been reviewed. Comments:       RN reviewed LPN assessment and completed RN assessment. RN completed patient assessment. RN reviewed technicians vital signs and procedure note. Tx completed. Reviewed by MANUELITO Ac

## 2022-01-12 NOTE — WOUND CARE
Wound care nurse consult: consult placed by Dr Kosta Guy to [facility]". Patient is a very sweet 41 y/o AAM admitted 12/26/21 for a gastric variceal bleed. Patient positive Covid 19  Past Medical History:   Diagnosis Date    Abdominal hematoma     AICD (automatic cardioverter/defibrillator) present     CAD (coronary artery disease)     anterior MI s/p 2 stents  2007    Chronic abdominal pain     Chronic kidney disease     ESRD; Dialysis dependent.  Home [facility] does labs- [facility] tpke    DVT (deep venous thrombosis) ([address] 2001    DVT of popliteal vein ([address] 11/2011    not anticoagulated due to bleeding, IVC filter    Endocarditis     Gallstone pancreatitis     Gastrointestinal disorder     acid reflux    Gastrointestinal disorder     peptic ulcer    Hemodialysis patient ([address]     High cholesterol     Hypertension     Kidney transplant     b/l kidneys 1995, 2001    Long term current use of anticoagulant therapy     Nephrotic syndrome     Other ill-defined conditions(799.89)     kidny transplant x2,  on dialysis    Other ill-defined conditions(799.89)     home dialysis    Other ill-defined conditions(799.89)     hx recurrent left leg DVT    Peritonitis ([address]     Seizures ([address] 2015    most recent- only had three in lifetime    Small bowel obstruction (HCC)     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    V-tach [facility])      Past Surgical History:   Procedure Laterality Date    HX APPENDECTOMY      HX CHOLECYSTECTOMY      HX OTHER SURGICAL  11/2011    exploratory laparotomy    HX OTHER SURGICAL      fem-pop    HX OTHER SURGICAL  02/2013    right upper extremity fistula    HX PACEMAKER Left 6/2009    AICD-st latonya-not connected    HX PACEMAKER Left     AICD-left side-BS-working    HX SMALL BOWEL RESECTION      HX TRANSPLANT  2001     Kidney    HX TRANSPLANT  1992    kidney    HX VASCULAR ACCESS Right     femoral access for HD- does 5 day dialysis @ home    IR INSERT NON TUNL CVC OVER 5 YRS  12/7/2021    IR INSERT NON TUNL CVC OVER 5 YRS  12/10/2021    IR INSERT TIPS HEPATIC SHUNT  12/30/2021    IR REMOVE TUNL CVAD W/O PORT / PUMP  10/29/2020    IR REPLACE CVC TUNNELED W/O PORT  9/10/2020    RI CARDIAC SURG PROCEDURE UNLIST  2007    2 stents    RI EDG US EXAM SURGICAL ALTER STOM DUODENUM/JEJUNUM  7/15/2011         RI ESOPHAGOGASTRODUODENOSCOPY TRANSORAL DIAGNOSTIC  5/24/2012         UPPER GI ENDOSCOPY,CTRL BLEED  12/6/2021         UPPER GI ENDOSCOPY,DIAGNOSIS  12/8/2021         VASCULAR SURGERY PROCEDURE UNLIST  11/14/2017    Insertion AV graft right upper arm (bovine); ligation of AV fistula right arm     Patient is a chronically ill looking man who is 5'7\", 170 lbs. There is bilateral foot edema and opened areas to left toes from previous fluid overlad and serous blisters:      Left heel Unstageable PI:      Sacral/coccyx stage 2 PI:      Wound Arm Left Right lateral Incision (Active)   Number of days: 491       Wound Arm Left Three small left upper arm incisions (Active)   Number of days: 491       Wound Buttocks Right skin tear 12/09/21 (Active)   Number of days: 34       Wound Toe (Comment  which one) Anterior; Left beefy red and wet, appears to be open blister 12/22/21 (Active)   Wound Image   01/12/22 1239   Dressing Status Dry;Clean 01/11/22 1941   Cleansed Not cleansed 01/11/22 1424   Dressing/Treatment Open to air 01/12/22 1239   Dressing Change Due 01/10/22 01/09/22 1437   Wound Assessment Dry;Pink/red 01/12/22 1239   Drainage Amount None 01/12/22 1239   Wound Odor None 01/12/22 1239   Rosa-Wound/Incision Assessment Edematous;Dry/flaky 01/12/22 1239   Edges Defined edges 01/11/22 1424   Wound Thickness Description Partial thickness 01/12/22 1239   Number of days: 21       Wound Heel Left boggy, skin sloughing off 12/28/21 (Active)   Wound Image   01/12/22 1239   Wound Etiology Pressure Unstageable 01/12/22 1239   Dressing Status Dry;Clean 01/11/22 1941 Cleansed Soap and water 01/11/22 1424   Dressing/Treatment Open to air 01/12/22 1239   Wound Length (cm) 3 cm 01/12/22 1239   Wound Width (cm) 4.5 cm 01/12/22 1239   Wound Surface Area (cm^2) 13.5 cm^2 01/12/22 1239   Wound Assessment Eschar dry 01/12/22 1239   Drainage Amount None 01/12/22 1239   Wound Odor None 01/12/22 1239   Rosa-Wound/Incision Assessment Dry/flaky 01/12/22 1239   Edges Defined edges 01/12/22 1239   Wound Thickness Description Full thickness 01/12/22 1239   Number of days: 15       Wound Sacral/coccyx Open area (Active)   Wound Image   01/12/22 1239   Wound Etiology Pressure Stage 2 01/12/22 1239   Dressing Status Clean;Dry; Intact 01/11/22 1941   Cleansed Soap and water 01/11/22 1424   Dressing/Treatment Open to air 01/12/22 1239   Dressing Change Due 01/12/22 01/11/22 1424   Wound Length (cm) 1 cm 01/12/22 1239   Wound Width (cm) 2 cm 01/12/22 1239   Wound Depth (cm) 0.2 cm 01/12/22 1239   Wound Surface Area (cm^2) 2 cm^2 01/12/22 1239   Wound Volume (cm^3) 0.4 cm^3 01/12/22 1239   Wound Assessment Pink/red 01/12/22 1239   Drainage Amount None 01/12/22 1239   Wound Odor None 01/12/22 1239   Rosa-Wound/Incision Assessment Denuded 01/12/22 1239   Edges Defined edges 01/12/22 1239   Wound Thickness Description Partial thickness 01/12/22 1239   Number of days: 10       Incision 04/02/21 Arm Left (Active)   Number of days: 285       Incision 11/15/21 Arm Left (Active)   Number of days: 58       Incision 12/30/21 Chest Left;Upper (Active)   Dressing Status Clean;Dry; Intact 01/11/22 1941   Cleansed Not cleansed 01/11/22 1424   Dressing/Treatment Open to air 01/11/22 1941   Incision Assessment Dry 01/11/22 1424   Drainage Amount None 01/11/22 1424   Wound Odor None 01/11/22 1424   Rosa-Wound/Incision Assessment Intact 01/10/22 1445   Number of days: 13       Incision 01/02/22 Arm Left (Active)   Dressing Status Clean; Intact;Dry 01/11/22 1941   Cleansed Not cleansed 01/11/22 1424   Dressing/Treatment Open to air 01/11/22 1941   Closure Sutures 01/10/22 1445   Margins Approximated 01/10/22 1445   Incision Assessment Dry 01/10/22 1445   Drainage Amount None 01/11/22 1424   Wound Odor None 01/11/22 1424   Rosa-Wound/Incision Assessment Intact 01/10/22 1445   Number of days: 10       Incision 01/04/22 Groin Right (Active)   Dressing Status Clean;Dry; Intact 01/11/22 1941   Dressing Change Due 01/12/22 01/11/22 1424   Cleansed Not cleansed 01/11/22 1424   Dressing/Treatment Gauze dressing/dressing sponge;Tegaderm/Transparent film dressing 01/11/22 1941   Closure Open to air 01/11/22 1424   Incision Assessment Dry 01/11/22 1424   Drainage Amount None 01/11/22 1424   Wound Odor None 01/11/22 1424   Rosa-Wound/Incision Assessment Intact 01/11/22 1424   Number of days: 8       Incision 01/04/22 Knee Right;Posterior (Active)   Dressing Status New dressing applied 01/11/22 1941   Dressing Change Due 01/12/22 01/11/22 1424   Dressing/Treatment Gauze dressing/dressing sponge;Tegaderm/Transparent film dressing 01/11/22 1941   Closure Sutures 01/11/22 1941   Margins Approximated 01/11/22 1941   Incision Assessment Dry 01/11/22 1424   Drainage Amount None 01/11/22 1424   Wound Odor None 01/11/22 1424   Number of days: 8      Patient seen by Vascular surgeon due to RLE DVT, patient has an IVC filter and no surgery planned, patient cannot be anticoagulated due to GI bleed. Recommend:    Sacral/coccyx: daily, cleanse with soap and water, dry skin completely. Cover with a foam dressing. Change if soiled. Left foot and left heel wounds: BID apply Venelex ointment to clean, dry skin. Float heel and leave CHRISTIN.     Alisa Aleman RN, [employer]

## 2022-01-13 LAB
ANION GAP SERPL CALC-SCNC: 6 MMOL/L (ref 5–15)
ATRIAL RATE: 86 BPM
BASOPHILS # BLD: 0.1 K/UL (ref 0–0.1)
BASOPHILS NFR BLD: 1 % (ref 0–1)
BUN SERPL-MCNC: 21 MG/DL (ref 6–20)
BUN/CREAT SERPL: 5 (ref 12–20)
CALCIUM SERPL-MCNC: 8.4 MG/DL (ref 8.5–10.1)
CALCULATED P AXIS, ECG09: 61 DEGREES
CALCULATED R AXIS, ECG10: 91 DEGREES
CALCULATED T AXIS, ECG11: 133 DEGREES
CHLORIDE SERPL-SCNC: 96 MMOL/L (ref 97–108)
CO2 SERPL-SCNC: 35 MMOL/L (ref 21–32)
CREAT SERPL-MCNC: 3.95 MG/DL (ref 0.7–1.3)
DIAGNOSIS, 93000: NORMAL
DIFFERENTIAL METHOD BLD: ABNORMAL
EOSINOPHIL # BLD: 0.6 K/UL (ref 0–0.4)
EOSINOPHIL NFR BLD: 6 % (ref 0–7)
ERYTHROCYTE [DISTWIDTH] IN BLOOD BY AUTOMATED COUNT: 17.2 % (ref 11.5–14.5)
GLUCOSE SERPL-MCNC: 71 MG/DL (ref 65–100)
HCT VFR BLD AUTO: 21.1 % (ref 36.6–50.3)
HGB BLD-MCNC: 6.8 G/DL (ref 12.1–17)
HISTORY CHECKED?,CKHIST: NORMAL
IMM GRANULOCYTES # BLD AUTO: 0 K/UL (ref 0–0.04)
IMM GRANULOCYTES NFR BLD AUTO: 0 % (ref 0–0.5)
LYMPHOCYTES # BLD: 2.5 K/UL (ref 0.8–3.5)
LYMPHOCYTES NFR BLD: 26 % (ref 12–49)
MCH RBC QN AUTO: 29.1 PG (ref 26–34)
MCHC RBC AUTO-ENTMCNC: 32.2 G/DL (ref 30–36.5)
MCV RBC AUTO: 90.2 FL (ref 80–99)
MONOCYTES # BLD: 1 K/UL (ref 0–1)
MONOCYTES NFR BLD: 11 % (ref 5–13)
NEUTS SEG # BLD: 5.3 K/UL (ref 1.8–8)
NEUTS SEG NFR BLD: 56 % (ref 32–75)
NRBC # BLD: 0 K/UL (ref 0–0.01)
NRBC BLD-RTO: 0 PER 100 WBC
P-R INTERVAL, ECG05: 168 MS
PLATELET # BLD AUTO: 141 K/UL (ref 150–400)
PMV BLD AUTO: 11.2 FL (ref 8.9–12.9)
POTASSIUM SERPL-SCNC: 3.7 MMOL/L (ref 3.5–5.1)
Q-T INTERVAL, ECG07: 450 MS
QRS DURATION, ECG06: 104 MS
QTC CALCULATION (BEZET), ECG08: 538 MS
RBC # BLD AUTO: 2.34 M/UL (ref 4.1–5.7)
RBC MORPH BLD: ABNORMAL
RBC MORPH BLD: ABNORMAL
SODIUM SERPL-SCNC: 137 MMOL/L (ref 136–145)
VENTRICULAR RATE, ECG03: 86 BPM
WBC # BLD AUTO: 9.5 K/UL (ref 4.1–11.1)

## 2022-01-13 PROCEDURE — 36415 COLL VENOUS BLD VENIPUNCTURE: CPT

## 2022-01-13 PROCEDURE — 74011000258 HC RX REV CODE- 258: Performed by: INTERNAL MEDICINE

## 2022-01-13 PROCEDURE — 74011250637 HC RX REV CODE- 250/637: Performed by: INTERNAL MEDICINE

## 2022-01-13 PROCEDURE — 74011250636 HC RX REV CODE- 250/636: Performed by: INTERNAL MEDICINE

## 2022-01-13 PROCEDURE — 80048 BASIC METABOLIC PNL TOTAL CA: CPT

## 2022-01-13 PROCEDURE — 74011000250 HC RX REV CODE- 250: Performed by: SURGERY

## 2022-01-13 PROCEDURE — 74011250637 HC RX REV CODE- 250/637: Performed by: NURSE PRACTITIONER

## 2022-01-13 PROCEDURE — P9016 RBC LEUKOCYTES REDUCED: HCPCS

## 2022-01-13 PROCEDURE — 2709999900 HC NON-CHARGEABLE SUPPLY

## 2022-01-13 PROCEDURE — 93005 ELECTROCARDIOGRAM TRACING: CPT

## 2022-01-13 PROCEDURE — 74011250637 HC RX REV CODE- 250/637: Performed by: SURGERY

## 2022-01-13 PROCEDURE — 65660000000 HC RM CCU STEPDOWN

## 2022-01-13 PROCEDURE — 36430 TRANSFUSION BLD/BLD COMPNT: CPT

## 2022-01-13 PROCEDURE — 85025 COMPLETE CBC W/AUTO DIFF WBC: CPT

## 2022-01-13 RX ORDER — SODIUM CHLORIDE 9 MG/ML
250 INJECTION, SOLUTION INTRAVENOUS AS NEEDED
Status: DISCONTINUED | OUTPATIENT
Start: 2022-01-13 | End: 2022-01-15 | Stop reason: ALTCHOICE

## 2022-01-13 RX ADMIN — Medication 1 AMPULE: at 20:49

## 2022-01-13 RX ADMIN — SUCRALFATE 1 G: 1 TABLET ORAL at 18:42

## 2022-01-13 RX ADMIN — SODIUM CHLORIDE, PRESERVATIVE FREE 10 ML: 5 INJECTION INTRAVENOUS at 05:32

## 2022-01-13 RX ADMIN — SUCRALFATE 1 G: 1 TABLET ORAL at 21:31

## 2022-01-13 RX ADMIN — OXYCODONE 10 MG: 5 TABLET ORAL at 11:11

## 2022-01-13 RX ADMIN — OXYCODONE 10 MG: 5 TABLET ORAL at 22:52

## 2022-01-13 RX ADMIN — SODIUM CHLORIDE, PRESERVATIVE FREE 10 ML: 5 INJECTION INTRAVENOUS at 15:45

## 2022-01-13 RX ADMIN — PIPERACILLIN AND TAZOBACTAM 3.38 G: 3; .375 INJECTION, POWDER, LYOPHILIZED, FOR SOLUTION INTRAVENOUS at 20:48

## 2022-01-13 RX ADMIN — Medication: at 02:48

## 2022-01-13 RX ADMIN — SUCRALFATE 1 G: 1 TABLET ORAL at 09:57

## 2022-01-13 RX ADMIN — PANTOPRAZOLE SODIUM 40 MG: 40 TABLET, DELAYED RELEASE ORAL at 09:57

## 2022-01-13 RX ADMIN — OXYCODONE 10 MG: 5 TABLET ORAL at 18:42

## 2022-01-13 RX ADMIN — ACETAMINOPHEN 325 MG: 325 TABLET ORAL at 02:48

## 2022-01-13 RX ADMIN — PIPERACILLIN AND TAZOBACTAM 3.38 G: 3; .375 INJECTION, POWDER, LYOPHILIZED, FOR SOLUTION INTRAVENOUS at 05:32

## 2022-01-13 RX ADMIN — SODIUM CHLORIDE, PRESERVATIVE FREE 10 ML: 5 INJECTION INTRAVENOUS at 21:32

## 2022-01-13 RX ADMIN — PANTOPRAZOLE SODIUM 40 MG: 40 TABLET, DELAYED RELEASE ORAL at 18:42

## 2022-01-13 RX ADMIN — HYDROMORPHONE HYDROCHLORIDE 2 MG: 2 TABLET ORAL at 06:11

## 2022-01-13 RX ADMIN — OXYCODONE 10 MG: 5 TABLET ORAL at 02:48

## 2022-01-13 RX ADMIN — Medication: at 09:58

## 2022-01-13 RX ADMIN — HYDROMORPHONE HYDROCHLORIDE 2 MG: 2 TABLET ORAL at 15:44

## 2022-01-13 NOTE — PROGRESS NOTES
Nephrology Progress Note  Blaise Leblanc     [facility]  Phone - [phone]   Patient: Edu Carbajal    YOB: 1975        Date- 1/13/2022   Admit Date: 12/26/2021  CC: Follow up for  esrd        IMPRESSION & PLAN:   · ESRD - home HD 5 days per week- Follows up with Dr Sheela Najera [facility]  · Covid 19 infection  · Right leg ileofemoral DVT -s/p venous thrombectomy and IVC filter placement 1/4  · H/o esophageal bleed from esophagitis  · Left arm swelling- s/p angioplasty of left subclavian vein  · Gram positive sepsis from dental abcess  · s/p lead extraction at [facility]  · Anemia of ckd  · Hx of renal tx in past times 2.  · Hypertension  · CAD  · Failed RTX times 2  · H/o DVT, s/p ivc filter/ h/o HIT      PLAN-   No dialysis TODAY   Continue epogen     Subjective: Interval History:   S/p hd yesterday  bp stable    Objective:   Vitals:    01/12/22 2328 01/13/22 0321 01/13/22 0546 01/13/22 0811   BP: 101/64 113/76  113/69   Pulse: 88 95  90   Resp: 18 17  17   Temp: 99.2 °F (37.3 °C) 100.1 °F (37.8 °C)  98.1 °F (36.7 °C)   TempSrc:       SpO2: 93% 91%  94%   Weight:   69 kg (152 lb 1.9 oz)    Height:          01/12 0701 - 01/13 0700  In: 480 [P.O.:480]  Out: 2500   Last 3 Recorded Weights in this Encounter    01/09/22 0605 01/12/22 0629 01/13/22 0546   Weight: 63 kg (139 lb) 68.2 kg (150 lb 6.4 oz) 69 kg (152 lb 1.9 oz)      Physical exam:     GEN: NAD  NECK- Supple  RESP:no distress  NEURO:non focal      Due to the COVID-19 pandemic, in order to reduce the spread and transmission of the virus, some basic elements of the physical exam have been deferred to reduce direct or close contact with the patient . Chart reviewed. Pertinent Notes reviewed.      Data Review :  Recent Labs     01/13/22  0259 01/12/22  0020 01/11/22  0022    133*  --    K 3.7 3.9  --    CL 96* 94*  --    CO2 35* 32  --    BUN 21* 37*  --    CREA 3.95* 5.47*  --    GLU 71 103*  --    CA 8.4* 8.3*  --    MG  --   --  2.2   PHOS  --  3.3  --      Recent Labs     01/13/22  0259 01/12/22  0020 01/11/22  0022   WBC 9.5 11.2* 11.0   HGB 6.8* 7.9* 7.6*   HCT 21.1* 25.4* 23.1*   * 158 149*     No results for input(s): FE, TIBC, PSAT, FERR in the last 72 hours.    Medication list  reviewed  Current Facility-Administered Medications   Medication Dose Route Frequency    0.9% sodium chloride infusion 250 mL  250 mL IntraVENous PRN    0.9% sodium chloride infusion 250 mL  250 mL IntraVENous PRN    acetaminophen (TYLENOL) tablet 650 mg  650 mg Oral Q4H PRN    Or    acetaminophen (TYLENOL) suppository 650 mg  650 mg Rectal Q4H PRN    HYDROmorphone (DILAUDID) tablet 2 mg  2 mg Oral Q8H PRN    piperacillin-tazobactam (ZOSYN) 3.375 g in 0.9% sodium chloride (MBP/ADV) 100 mL MBP  3.375 g IntraVENous Q12H    VANCOMYCIN INFORMATION NOTE   Other Rx Dosing/Monitoring    oxyCODONE IR (ROXICODONE) tablet 10 mg  10 mg Oral Q4H PRN    And    acetaminophen (TYLENOL) tablet 325 mg  325 mg Oral Q4H PRN    sucralfate (CARAFATE) tablet 1 g  1 g Oral AC&HS    pantoprazole (PROTONIX) tablet 40 mg  40 mg Oral ACB&D    alum-mag hydroxide-simeth (MYLANTA) oral suspension 30 mL  30 mL Oral Q4H PRN    balsam peru-castor oiL (VENELEX) ointment   Topical BID    sodium chloride (NS) flush 5-40 mL  5-40 mL IntraVENous Q8H    sodium chloride (NS) flush 5-40 mL  5-40 mL IntraVENous PRN    polyethylene glycol (MIRALAX) packet 17 g  17 g Oral DAILY PRN    ondansetron (ZOFRAN) injection 4 mg  4 mg IntraVENous Q6H PRN    alcohol 62% (NOZIN) nasal  1 Ampule  1 Ampule Topical Q12H    epoetin emilie-epbx (RETACRIT) injection 20,000 Units  20,000 Units SubCUTAneous Q MON, WED & FRI          Sonya Brumfield MD              [facility]  [facility] [place] [address], [address]  [place], [address]  Phone - [phone]               Fax - [phone]

## 2022-01-13 NOTE — PROGRESS NOTES
Transition of Care Plan:     RUR: 30%   Disposition: [facility] or [facility] (accepted (waiting on bed availability)  Follow up appointments: Follow up with PCP and/or Specialist   DME needed: N/A  Transportation at Discharge: Family to assist   [place] or means to access home: Family will provide        IM Medicare Letter: 2nd  Medicare Letter to be given   Is patient a BCPI-A Bundle:   N/A                  If yes, was Bundle Letter given?:    Is patient a  and connected with the [facility]? N/A  If yes, was [place] transfer form completed and [facility] notified? Caregiver Contact: Mikey Sarah (sister) [phone] or Cristal Skiff (family member) [phone]  Discharge Caregiver contacted prior to discharge?  Family to be contacted    2:40pm-  check Allscripts to check the status of referral for [facility]. [facility] will not have any HD beds available until next week. 12:00pm-  called pt's room due to pt being on isolation precautions due to Covid to discuss d/c plan. Pt stated that he would like to go to [facility] or [facility] due to having to do dialysis     11:30am- called [facility] [phone] via phone to inquired about placement. [facility] stated that they may have beds available tomorrow or over the weekend.

## 2022-01-13 NOTE — PROGRESS NOTES
Problem: Falls - Risk of  Goal: *Absence of Falls  Description: Document Lazaro Muse Fall Risk and appropriate interventions in the flowsheet. Outcome: Progressing Towards Goal  Note: Fall Risk Interventions:  Mobility Interventions: Bed/chair exit alarm,Communicate number of staff needed for ambulation/transfer,Patient to call before getting OOB    Mentation Interventions: Bed/chair exit alarm,Adequate sleep, hydration, pain control,More frequent rounding,Update white board    Medication Interventions: Bed/chair exit alarm,Patient to call before getting OOB,Teach patient to arise slowly    Elimination Interventions: Bed/chair exit alarm,Call light in reach,Patient to call for help with toileting needs,Stay With Me (per policy),Toilet paper/wipes in reach,Toileting schedule/hourly rounds    History of Falls Interventions: Bed/chair exit alarm,Door open when patient unattended,Investigate reason for fall,Room close to nurse's station         Problem: Patient Education: Go to Patient Education Activity  Goal: Patient/Family Education  Outcome: Progressing Towards Goal     Problem: Pressure Injury - Risk of  Goal: *Prevention of pressure injury  Description: Document Yong Scale and appropriate interventions in the flowsheet.   Outcome: Progressing Towards Goal  Note: Pressure Injury Interventions:  Sensory Interventions: Assess changes in LOC,Float heels,Keep linens dry and wrinkle-free,Minimize linen layers    Moisture Interventions: Absorbent underpads,Minimize layers    Activity Interventions: Assess need for specialty bed,Increase time out of bed,Pressure redistribution bed/mattress(bed type)    Mobility Interventions: Assess need for specialty bed,Float heels,HOB 30 degrees or less    Nutrition Interventions: Document food/fluid/supplement intake,Discuss nutritional consult with provider    Friction and Shear Interventions: Apply protective barrier, creams and emollients,Foam dressings/transparent film/skin sealants,HOB 30 degrees or less,Lift sheet,Minimize layers                Problem: Patient Education: Go to Patient Education Activity  Goal: Patient/Family Education  Outcome: Progressing Towards Goal     Problem: Breathing Pattern - Ineffective  Goal: Ability to achieve and maintain a regular respiratory rate  Outcome: Progressing Towards Goal     Problem:  Body Temperature -  Risk of, Imbalanced  Goal: Ability to maintain a body temperature within defined limits  Outcome: Progressing Towards Goal  Goal: Will regain or maintain usual level of consciousness  Outcome: Progressing Towards Goal  Goal: Complications related to the disease process, condition or treatment will be avoided or minimized  Outcome: Progressing Towards Goal     Problem: Isolation Precautions - Risk of Spread of Infection  Goal: Prevent transmission of infectious organism to others  Outcome: Progressing Towards Goal     Problem: Nutrition Deficits  Goal: Optimize nutrtional status  Outcome: Progressing Towards Goal     Problem: Risk for Fluid Volume Deficit  Goal: Maintain normal heart rhythm  Outcome: Progressing Towards Goal  Goal: Maintain absence of muscle cramping  Outcome: Progressing Towards Goal  Goal: Maintain normal serum potassium, sodium, calcium, phosphorus, and pH  Outcome: Progressing Towards Goal     Problem: Loneliness or Risk for Loneliness  Goal: Demonstrate positive use of time alone when socialization is not possible  Outcome: Progressing Towards Goal     Problem: Fatigue  Goal: Verbalize increase energy and improved vitality  Outcome: Progressing Towards Goal     Problem: Patient Education: Go to Patient Education Activity  Goal: Patient/Family Education  Outcome: Progressing Towards Goal     Problem: Patient Education: Go to Patient Education Activity  Goal: Patient/Family Education  Outcome: Progressing Towards Goal     Problem: Patient Education: Go to Patient Education Activity  Goal: Patient/Family Education  Outcome: Progressing Towards Goal     Problem: Anemia Care Plan (Adult and Pediatric)  Goal: *Labs within defined limits  Outcome: Progressing Towards Goal  Goal: *Tolerates increased activity  Outcome: Progressing Towards Goal     Problem: Patient Education: Go to Patient Education Activity  Goal: Patient/Family Education  Outcome: Progressing Towards Goal     Problem: Infection - Risk of, Central Venous Catheter-Associated Bloodstream Infection  Goal: *Absence of infection signs and symptoms  Outcome: Progressing Towards Goal     Problem: Patient Education: Go to Patient Education Activity  Goal: Patient/Family Education  Outcome: Progressing Towards Goal

## 2022-01-13 NOTE — PROGRESS NOTES
Occupational Therapy    Chart reviewed in prep for skilled OT treatment; however, pt's HgB noted to be 6.8. Will defer treatment and follow up later as able and appropriate.     Thank you,  Dori Rice, OT

## 2022-01-13 NOTE — PROGRESS NOTES
Hospitalist Progress Note    NAME: Edu Carbajal   :  1975   MRN:  [id number]       Assessment / Plan: This is a 61-year-old male with a history of dilated, end-stage renal disease, on hemodialysis , Streptococcus intermedius bacteremia in 2021, status post IV thank stopped 2021, gastric varices with upper GI bleed, thrombocytopenia, transaminitis, previous Covid pneumonia, chronically anticoagulated with warfarin for recurrent DVT, acute on chronic systolic heart failure with an EF of 35%, history of V. tach status post AICD, CAD, severe pulmonary hypertension who presented to the emergency room  with complaints of epigastric abdominal pain and vomiting throughout the day. He was discharged  for the same symptoms having received an EGD with banding of esophageal varices as well as COVID-19 pneumonia. Patient had an unwitnessed fall in the emergency room. He received CT of the head and C-spine both of which were negative for any acute process. He was admitted to the ICU for management of an upper GI bleed and intubated. He transferred out of the ICU . Covid 19 positive   Fevers, recurrent   Severe Sepsis of unknown origin  Hypotension   Patient spiked fever overnight,  highest 102.8, was tachycardic with heart rate between 101-120. Sepsis protocol initiated  Lactic acid 2.5, repeat lactic acid until his resolution. Lactic acid resolved after IV fluid  ivf bolus 250 cc, and albumin  Continue with IV antibiotics cefepime and vancomycin  chest x-ray negative for pneumonia  Blood cultures negative to date  Continues to spike fever, had fever of 10 2 in the morning  COVID test came back positive, patient is not hypoxic. We will hold off giving steroid  :  Patient continues to spike fevers today spiked a fever of 102. He reported no symptoms  WBC trending down  Continue IV antibiotics.   Blood culture negative to date  01/13: 11 beat of V. tach, EKG within normal limit, afebrile today    01/11  Patient spiked fever this morning 100.9, WBC trending down  Continue Vanco and Zosyn  01/10: Patient is afebrile today, blood pressure within normal limits  WBC trending down but hemoglobin 6.7. Blood culture negative to date. Hemorrhagic shock secondary to acute blood loss anemia, complicated by anemia of CKD and thrombocytopenia, secondary to upper GI bleed, bleeding gastric varices  Acute on chronic anemia   -Status post 13 units of PRBCs, 3 units of plasma, 2 units of cryoprecipitate, 3 units of platelets, pressors, octreotide drip, PPI  -Status post 4 EGDs, most recent EGD here December 26, which showed 2 healing esophageal ulcers without stigmata, large gastric varices in the stomach, with 1 gastric varices oozing blood status post epinephrine injection, 2 hemoclips. -Was felt to not be a candidate for TIPS due to severe right heart failure  -He was reintubated December 28 for IR evaluation for BRTO December 28, but he was found to have a large gastric renal shunt which would deem the procedure to the unlikely of any benefit, if even possible, making TIPS the only viable option  -TIPS procedure attempted 12/30 but his portal pressures were not consistent with portal hypertension, therefore he was not a candidate  -Liver was found to be normal on ultrasound and CT was negative for signs of cirrhosis  -?   Passive congestion from right-sided heart failure causing acute liver decompensation but has improved, but patient is too high risk to do a liver biopsy currently  -Appreciate recommendations from GI and IR  - Per GI: Serial H&H, advance diet, if he rebleeds, could possibly need a repeat EGD to rule out Dieulafoy lesion of stomach  -Continue pantoprazole, Carafate  -01/08: hb stable around 8 , no bleeding  01/09: Hemoglobin 7.4  01/10: Hemoglobin 6.7, a unit of PRBC given this morning  01/11: Hemoglobin 7.6  01/13: Hemoglobin 6.8, given a unit of PRBC    Acute hypoxic respiratory failure status post extubation  -Intubated December 28, extubated December 29  Currently on room air      ESRD on hemodialysis, status post renal transplant x2  -Nephrology on board, appreciate the recommendations.  -Continue Retacrit    Heart failure reduced EF, not in exacerbation  -Last EF 35% (November 2021)  -Not currently on a beta-blocker or an ACE inhibitor in the setting of acute illness    History of HIT  -Avoid heparin  -SCDs contraindicated due to PAD     Extensive right lower extremity DVT status post thrombectomy and IVC filter placement  S/p s/p venous thrombectomy and IVC filter placement 1/4  -Vascular on board- appreciate the recommendations   -Duplex dated 12-30-21: Graft 1: left fem-tibial graft  The graft inflow demonstrates <50% stenosis. The proximal anastomosis demonstrates <50% stenosis. The proximal graft demonstrates <50% stenosis. The mid graft demonstrates <50% stenosis. The distal graft demonstrates <50% stenosis. The distal anastomosis demonstrates <50% stenosis. The graft outflow demonstrates severe (>75%) stenosis. The mid, distal and outflow graft flow is monophasic. The inflow and proximal graft flow is triphasic. -PETER 12-30-21: PVR waveforms in the right ankle consistent with moderate disease. PVR waveforms at the right metatarsal region consistent with moderate disease. PVR waveforms in the left ankle consistent with moderate disease. PVR waveforms of the left metatarsal region consistent with severe disease. Unable to obtain left brachial pressure due to AV Fistula. Ankle/metatarsal pressures only, patient has LLE bypass graft. Right DP is non compressible, RLE appears moderate. Left PT is non compressible, LLE appears severe with absent digit waveform and absent pulses. LLE bypass graft appears patent. Left PTA proximal appears occluded.  Left JOEY proximal appears monophasic with a velocity of 11 cm/s.  01/12: Reviewed vascular surgery input, no indication for further thrombectomy, patient will continue the blood clots ,has IVC filter placed  01/11  Repeat right lower extremity Doppler showed persistent clots, some clot occlusive, not. Reconsulted vascular surgery for input to see if need for repeat thrombectomy  01/10: Patient continues to complain of pain on his left lower extremity associated with swelling which he Thinks is worse than when he came in  We will order repeat lower extremity Doppler to see if there is any new clot post thrombectomy    Deconditioning  -PT/OT    Chronic pain, chronically on opioids  Patient reported being in a lot of pain despite being on oxycodone  Want IV pain medication, explained to him that oral pain medication provide better pain control. c/w p.o. Dilaudid every 8 hours only given if ox Roxicodone does not provide pain relief. We will avoid IV narcotics    Wounds to left second and third great toes  -Continue wound care: wash with soap and water, dry completely. paint with betadine/povidine swabs  Weave a 4x4 between toes. Left arm swelling in the setting of known central venous stenosis  · Left arm swelling- s/p angioplasty of left subclavian vein  -    18.5 - 24.9 Normal weight / Body mass index is 23.82 kg/m².     Estimated discharge date: January 4  Barriers:    Code status: Full  Prophylaxis: H2B/PPI  Recommended Disposition: Home w/Family     Subjective:     Chief Complaint / Reason for Physician Visit  Follow-up sepsis/left lower extremity pain  No acute issues  Review of Systems:  Symptom Y/N Comments  Symptom Y/N Comments   Fever/Chills N   Chest Pain N    Poor Appetite N   Edema Y L LEG   Cough N   Abdominal Pain Y Chronic    Sputum N   Joint Pain N    SOB/ZAMUDIO N   Pruritis/Rash N    Nausea/vomit n   Tolerating PT/OT -    Diarrhea N   Tolerating Diet y    Constipation N   Other Y      Could NOT obtain due to:      Objective:     VITALS:   Last 24hrs VS reviewed since prior progress note. Most recent are:  Patient Vitals for the past 24 hrs:   Temp Pulse Resp BP SpO2   01/13/22 1645 97.3 °F (36.3 °C) 97 21 129/85 98 %   01/13/22 1630 98.4 °F (36.9 °C) 83 19 103/66 98 %   01/13/22 1611 97.9 °F (36.6 °C) 100 17 123/82 98 %   01/13/22 1518 98.5 °F (36.9 °C) 91 19 99/71 98 %   01/13/22 1100 99 °F (37.2 °C) 90 18 120/78 97 %   01/13/22 0811 98.1 °F (36.7 °C) 90 17 113/69 94 %   01/13/22 0321 100.1 °F (37.8 °C) 95 17 113/76 91 %   01/12/22 2328 99.2 °F (37.3 °C) 88 18 101/64 93 %   01/12/22 2322 -- 88 19 -- 94 %   01/12/22 2000 99.8 °F (37.7 °C) 98 23 (!) 101/52 93 %       Intake/Output Summary (Last 24 hours) at 1/13/2022 1656  Last data filed at 1/13/2022 0345  Gross per 24 hour   Intake 480 ml   Output 0 ml   Net 480 ml        I had a face to face encounter and independently examined this patient on 1/13/2022, as outlined below:  PHYSICAL EXAM:  General: Alert, cooperative, no acute distress    EENT:  Anicteric sclerae. MMM  Resp:  Diminished bilaterally, no wheezing or rales  CV:  Regular  rhythm, no murmurs, rubs or gallops  GI:  Soft, Non distended,  \  +Bowel sounds  Neurologic:  Alert and oriented X 3, normal speech,   Psych:   Good insight. Not anxious nor agitated  Skin:  No rashes. No jaundice.  Dressing to LUE fistula CDI  MSK:  Moves all extremities  PVS:   Palpable pulses, left leg and left arm with significant non-pitting edema    Reviewed most current lab test results and cultures  YES  Reviewed most current radiology test results   YES  Review and summation of old records today    NO  Reviewed patient's current orders and MAR    YES  PMH/SH reviewed - no change compared to H&P  ________________________________________________________________________  Care Plan discussed with:    Comments   Patient Y    Family      RN Y    Care Manager     Consultant                        Multidiciplinary team rounds were held today with , nursing, pharmacist and clinical coordinator. Patient's plan of care was discussed; medications were reviewed and discharge planning was addressed. ________________________________________________________________________      Comments   >50% of visit spent in counseling and coordination of care Y    ________________________________________________________________________  Ap Vicente MD     Procedures: see electronic medical records for all procedures/Xrays and details which were not copied into this note but were reviewed prior to creation of Plan. LABS:  I reviewed today's most current labs and imaging studies.   Pertinent labs include:  Recent Labs     01/13/22  0259 01/12/22  0020 01/11/22  0022   WBC 9.5 11.2* 11.0   HGB 6.8* 7.9* 7.6*   HCT 21.1* 25.4* 23.1*   * 158 149*     Recent Labs     01/13/22  0259 01/12/22  0020 01/11/22  0022    133*  --    K 3.7 3.9  --    CL 96* 94*  --    CO2 35* 32  --    GLU 71 103*  --    BUN 21* 37*  --    CREA 3.95* 5.47*  --    CA 8.4* 8.3*  --    MG  --   --  2.2   PHOS  --  3.3  --    ALB  --  1.6*  --        Signed: Ap Vicente MD

## 2022-01-13 NOTE — PROGRESS NOTES
Physical Therapy    Chart reviewed in prep for skilled PT treatment; however, pt's HgB noted to be 6.8. Will defer treatment and follow up later as able and appropriate.     Thank you,  Bacilio Markham, PT, DPT

## 2022-01-13 NOTE — PROGRESS NOTES
End of Shift Note    Bedside shift change report given to [facility] (oncoming nurse) by Remington Stock RN (offgoing nurse). Report included the following information SBAR, Kardex and Procedure Summary    Shift worked:  Days     Shift summary and any significant changes:     Dialysis this morning - took 2.5 L off and patient tolerated well. Concerns for physician to address:       Zone phone for oncoming shift:          Activity:  Activity Level: Up with Assistance  Number times ambulated in hallways past shift: 0  Number of times OOB to chair past shift: 0    Cardiac:   Cardiac Monitoring: Yes      Cardiac Rhythm: Sinus Rhythm    Access:   Current line(s): central line and HD access     Genitourinary:   Urinary status: anuric    Respiratory:   O2 Device: None (Room air)  Chronic home O2 use?: NO  Incentive spirometer at bedside: NO     GI:  Last Bowel Movement Date: 01/12/22  Current diet:  ADULT ORAL NUTRITION SUPPLEMENT Breakfast, Lunch, Dinner; Renal Supplement  ADULT DIET Regular; Low Potassium (Less than 3000 mg/day); Low Phosphorus (Less than 1000 mg); Buttered pecan Nepro please! Passing flatus: YES  Tolerating current diet: YES       Pain Management:   Patient states pain is manageable on current regimen: YES    Skin:  Yong Score: 18  Interventions: float heels, increase time out of bed, foam dressing and nutritional support     Patient Safety:  Fall Score:  Total Score: 5  Interventions: bed/chair alarm, gripper socks and pt to call before getting OOB  High Fall Risk: Yes    Length of Stay:  Expected LOS: 4d 9h  Actual LOS: [address], RN

## 2022-01-13 NOTE — PROGRESS NOTES
Problem: Falls - Risk of  Goal: *Absence of Falls  Description: Document Suri Litter Fall Risk and appropriate interventions in the flowsheet. Outcome: Progressing Towards Goal  Note: Fall Risk Interventions:  Mobility Interventions: Bed/chair exit alarm,Communicate number of staff needed for ambulation/transfer,Patient to call before getting OOB,Utilize walker, cane, or other assistive device    Mentation Interventions: Bed/chair exit alarm,Adequate sleep, hydration, pain control,More frequent rounding,Update white board    Medication Interventions: Bed/chair exit alarm,Patient to call before getting OOB,Teach patient to arise slowly    Elimination Interventions: Bed/chair exit alarm,Call light in reach,Patient to call for help with toileting needs    History of Falls Interventions: Bed/chair exit alarm,Door open when patient unattended         Problem: Patient Education: Go to Patient Education Activity  Goal: Patient/Family Education  Outcome: Progressing Towards Goal     Problem: Pressure Injury - Risk of  Goal: *Prevention of pressure injury  Description: Document Yong Scale and appropriate interventions in the flowsheet.   Outcome: Progressing Towards Goal  Note: Pressure Injury Interventions:  Sensory Interventions: Assess changes in LOC,Float heels,Keep linens dry and wrinkle-free,Minimize linen layers    Moisture Interventions: Absorbent underpads,Minimize layers    Activity Interventions: Assess need for specialty bed,Increase time out of bed,PT/OT evaluation    Mobility Interventions: Assess need for specialty bed,Float heels,PT/OT evaluation,HOB 30 degrees or less    Nutrition Interventions: Offer support with meals,snacks and hydration,Document food/fluid/supplement intake    Friction and Shear Interventions: Apply protective barrier, creams and emollients,HOB 30 degrees or less,Minimize layers                Problem: Patient Education: Go to Patient Education Activity  Goal: Patient/Family Education  Outcome: Progressing Towards Goal     Problem: Breathing Pattern - Ineffective  Goal: Ability to achieve and maintain a regular respiratory rate  Outcome: Progressing Towards Goal     Problem:  Body Temperature -  Risk of, Imbalanced  Goal: Ability to maintain a body temperature within defined limits  Outcome: Progressing Towards Goal  Goal: Will regain or maintain usual level of consciousness  Outcome: Progressing Towards Goal  Goal: Complications related to the disease process, condition or treatment will be avoided or minimized  Outcome: Progressing Towards Goal     Problem: Isolation Precautions - Risk of Spread of Infection  Goal: Prevent transmission of infectious organism to others  Outcome: Progressing Towards Goal     Problem: Nutrition Deficits  Goal: Optimize nutrtional status  Outcome: Progressing Towards Goal     Problem: Risk for Fluid Volume Deficit  Goal: Maintain normal heart rhythm  Outcome: Progressing Towards Goal  Goal: Maintain absence of muscle cramping  Outcome: Progressing Towards Goal  Goal: Maintain normal serum potassium, sodium, calcium, phosphorus, and pH  Outcome: Progressing Towards Goal     Problem: Loneliness or Risk for Loneliness  Goal: Demonstrate positive use of time alone when socialization is not possible  Outcome: Progressing Towards Goal     Problem: Fatigue  Goal: Verbalize increase energy and improved vitality  Outcome: Progressing Towards Goal     Problem: Patient Education: Go to Patient Education Activity  Goal: Patient/Family Education  Outcome: Progressing Towards Goal     Problem: Patient Education: Go to Patient Education Activity  Goal: Patient/Family Education  Outcome: Progressing Towards Goal     Problem: Patient Education: Go to Patient Education Activity  Goal: Patient/Family Education  Outcome: Progressing Towards Goal     Problem: Anemia Care Plan (Adult and Pediatric)  Goal: *Labs within defined limits  Outcome: Progressing Towards Goal  Goal: *Tolerates increased activity  Outcome: Progressing Towards Goal     Problem: Patient Education: Go to Patient Education Activity  Goal: Patient/Family Education  Outcome: Progressing Towards Goal     Problem: Infection - Risk of, Central Venous Catheter-Associated Bloodstream Infection  Goal: *Absence of infection signs and symptoms  Outcome: Progressing Towards Goal     Problem: Patient Education: Go to Patient Education Activity  Goal: Patient/Family Education  Outcome: Progressing Towards Goal

## 2022-01-14 LAB
ABO + RH BLD: NORMAL
ALBUMIN SERPL-MCNC: 1.4 G/DL (ref 3.5–5)
ANION GAP SERPL CALC-SCNC: 8 MMOL/L (ref 5–15)
BACTERIA SPEC CULT: NORMAL
BACTERIA SPEC CULT: NORMAL
BLD PROD TYP BPU: NORMAL
BLD PROD TYP BPU: NORMAL
BLOOD GROUP ANTIBODIES SERPL: NORMAL
BPU ID: NORMAL
BPU ID: NORMAL
BUN SERPL-MCNC: 27 MG/DL (ref 6–20)
BUN/CREAT SERPL: 5 (ref 12–20)
CALCIUM SERPL-MCNC: 8.1 MG/DL (ref 8.5–10.1)
CHLORIDE SERPL-SCNC: 95 MMOL/L (ref 97–108)
CO2 SERPL-SCNC: 33 MMOL/L (ref 21–32)
CREAT SERPL-MCNC: 4.97 MG/DL (ref 0.7–1.3)
CROSSMATCH RESULT,%XM: NORMAL
CROSSMATCH RESULT,%XM: NORMAL
ERYTHROCYTE [DISTWIDTH] IN BLOOD BY AUTOMATED COUNT: 16.7 % (ref 11.5–14.5)
GLUCOSE SERPL-MCNC: 86 MG/DL (ref 65–100)
HAV IGM SER QL: NONREACTIVE
HBV CORE IGM SER QL: NONREACTIVE
HBV SURFACE AG SER QL: <0.1 INDEX
HBV SURFACE AG SER QL: NEGATIVE
HCT VFR BLD AUTO: 26.1 % (ref 36.6–50.3)
HCV AB SERPL QL IA: NONREACTIVE
HCV AB SERPL QL IA: NONREACTIVE
HGB BLD-MCNC: 8.7 G/DL (ref 12.1–17)
MCH RBC QN AUTO: 29 PG (ref 26–34)
MCHC RBC AUTO-ENTMCNC: 33.3 G/DL (ref 30–36.5)
MCV RBC AUTO: 87 FL (ref 80–99)
NRBC # BLD: 0 K/UL (ref 0–0.01)
NRBC BLD-RTO: 0 PER 100 WBC
PHOSPHATE SERPL-MCNC: 3.5 MG/DL (ref 2.6–4.7)
PLATELET # BLD AUTO: 156 K/UL (ref 150–400)
PMV BLD AUTO: 11.8 FL (ref 8.9–12.9)
POTASSIUM SERPL-SCNC: 3.8 MMOL/L (ref 3.5–5.1)
RBC # BLD AUTO: 3 M/UL (ref 4.1–5.7)
SERVICE CMNT-IMP: NORMAL
SERVICE CMNT-IMP: NORMAL
SODIUM SERPL-SCNC: 136 MMOL/L (ref 136–145)
SP1: NORMAL
SP2: NORMAL
SP3: NORMAL
SPECIMEN EXP DATE BLD: NORMAL
STATUS OF UNIT,%ST: NORMAL
STATUS OF UNIT,%ST: NORMAL
UNIT DIVISION, %UDIV: 0
UNIT DIVISION, %UDIV: 0
VANCOMYCIN SERPL-MCNC: 24.1 UG/ML
WBC # BLD AUTO: 10.3 K/UL (ref 4.1–11.1)

## 2022-01-14 PROCEDURE — 36415 COLL VENOUS BLD VENIPUNCTURE: CPT

## 2022-01-14 PROCEDURE — 97116 GAIT TRAINING THERAPY: CPT

## 2022-01-14 PROCEDURE — 74011250636 HC RX REV CODE- 250/636: Performed by: SURGERY

## 2022-01-14 PROCEDURE — 74011250636 HC RX REV CODE- 250/636: Performed by: INTERNAL MEDICINE

## 2022-01-14 PROCEDURE — 74011250637 HC RX REV CODE- 250/637: Performed by: SURGERY

## 2022-01-14 PROCEDURE — 74011000258 HC RX REV CODE- 258: Performed by: INTERNAL MEDICINE

## 2022-01-14 PROCEDURE — 74011000250 HC RX REV CODE- 250: Performed by: SURGERY

## 2022-01-14 PROCEDURE — 80202 ASSAY OF VANCOMYCIN: CPT

## 2022-01-14 PROCEDURE — 80069 RENAL FUNCTION PANEL: CPT

## 2022-01-14 PROCEDURE — 74011250637 HC RX REV CODE- 250/637: Performed by: INTERNAL MEDICINE

## 2022-01-14 PROCEDURE — 74011250637 HC RX REV CODE- 250/637: Performed by: NURSE PRACTITIONER

## 2022-01-14 PROCEDURE — 65270000029 HC RM PRIVATE

## 2022-01-14 PROCEDURE — 90935 HEMODIALYSIS ONE EVALUATION: CPT

## 2022-01-14 PROCEDURE — 97535 SELF CARE MNGMENT TRAINING: CPT | Performed by: OCCUPATIONAL THERAPIST

## 2022-01-14 PROCEDURE — 85027 COMPLETE CBC AUTOMATED: CPT

## 2022-01-14 PROCEDURE — 97530 THERAPEUTIC ACTIVITIES: CPT

## 2022-01-14 RX ORDER — METOPROLOL TARTRATE 25 MG/1
12.5 TABLET, FILM COATED ORAL EVERY 12 HOURS
Status: DISCONTINUED | OUTPATIENT
Start: 2022-01-14 | End: 2022-01-18 | Stop reason: HOSPADM

## 2022-01-14 RX ADMIN — SUCRALFATE 1 G: 1 TABLET ORAL at 16:13

## 2022-01-14 RX ADMIN — ACETAMINOPHEN 650 MG: 325 TABLET ORAL at 14:12

## 2022-01-14 RX ADMIN — IRON SUCROSE 200 MG: 20 INJECTION, SOLUTION INTRAVENOUS at 14:52

## 2022-01-14 RX ADMIN — SODIUM CHLORIDE, PRESERVATIVE FREE 10 ML: 5 INJECTION INTRAVENOUS at 05:15

## 2022-01-14 RX ADMIN — EPOETIN ALFA-EPBX 20000 UNITS: 20000 INJECTION, SOLUTION INTRAVENOUS; SUBCUTANEOUS at 22:51

## 2022-01-14 RX ADMIN — Medication: at 22:51

## 2022-01-14 RX ADMIN — VANCOMYCIN HYDROCHLORIDE 1000 MG: 1 INJECTION, POWDER, LYOPHILIZED, FOR SOLUTION INTRAVENOUS at 17:28

## 2022-01-14 RX ADMIN — PANTOPRAZOLE SODIUM 40 MG: 40 TABLET, DELAYED RELEASE ORAL at 16:13

## 2022-01-14 RX ADMIN — PIPERACILLIN AND TAZOBACTAM 3.38 G: 3; .375 INJECTION, POWDER, LYOPHILIZED, FOR SOLUTION INTRAVENOUS at 14:39

## 2022-01-14 RX ADMIN — METOPROLOL TARTRATE 12.5 MG: 25 TABLET, FILM COATED ORAL at 21:03

## 2022-01-14 RX ADMIN — OXYCODONE 10 MG: 5 TABLET ORAL at 05:15

## 2022-01-14 RX ADMIN — SODIUM CHLORIDE, PRESERVATIVE FREE 10 ML: 5 INJECTION INTRAVENOUS at 22:27

## 2022-01-14 RX ADMIN — HYDROMORPHONE HYDROCHLORIDE 2 MG: 2 TABLET ORAL at 17:27

## 2022-01-14 RX ADMIN — Medication 1 AMPULE: at 22:51

## 2022-01-14 RX ADMIN — HYDROMORPHONE HYDROCHLORIDE 2 MG: 2 TABLET ORAL at 01:36

## 2022-01-14 RX ADMIN — SUCRALFATE 1 G: 1 TABLET ORAL at 22:50

## 2022-01-14 RX ADMIN — PANTOPRAZOLE SODIUM 40 MG: 40 TABLET, DELAYED RELEASE ORAL at 09:19

## 2022-01-14 RX ADMIN — SODIUM CHLORIDE, PRESERVATIVE FREE 30 ML: 5 INJECTION INTRAVENOUS at 14:52

## 2022-01-14 RX ADMIN — Medication: at 12:46

## 2022-01-14 RX ADMIN — OXYCODONE 10 MG: 5 TABLET ORAL at 12:45

## 2022-01-14 RX ADMIN — OXYCODONE 10 MG: 5 TABLET ORAL at 21:03

## 2022-01-14 RX ADMIN — HYDROMORPHONE HYDROCHLORIDE 2 MG: 2 TABLET ORAL at 09:19

## 2022-01-14 RX ADMIN — Medication 1 AMPULE: at 07:30

## 2022-01-14 NOTE — PROGRESS NOTES
Transition of Care Plan:     RUR: 30%   Disposition: [facility] or [facility] (accepted (waiting on bed availability)  Follow up appointments: Follow up with PCP and/or Specialist   DME needed: N/A  Transportation at Discharge: Family to assist   [place] or means to access home: Family will provide        IM Medicare Letter: 2nd  Medicare Letter to be given   Is patient a BCPI-A Bundle:   N/A                  If yes, was Bundle Letter given?:    Is patient a  and connected with the [facility]? N/A  If yes, was [employer] transfer form completed and [facility] notified? Caregiver Contact: Janeth Tafoya (sister) [phone] or Nell Gonzalez (family member) [phone]  Discharge Caregiver contacted prior to discharge?  Family to be contacted    [facility] do not have bed availability at this time. CM spoke with pt about sending out additional referrals to other I[facility]s. Pt declined and stated he wanted to wait for either [facility] or [facility]. CM will continue to monitor.       Ayan Mcgee, MSW  [facility]

## 2022-01-14 NOTE — PROGRESS NOTES
Problem: Self Care Deficits Care Plan (Adult)  Goal: *Acute Goals and Plan of Care (Insert Text)  Description: FUNCTIONAL STATUS PRIOR TO ADMISSION: Patient was modified independent using a Rollator for functional mobility. Lives with sister in upstairs duplex with tub-shower combo. Was driving to/from HD.    HOME SUPPORT: The patient lived with sister but did not require assist.    Occupational Therapy Goals  1/14/2021:  All goals remain appropriate to continue   Initiated 1/6/2022  1. Patient will perform EOB bathing with minimal assistance within 7 day(s). 2.  Patient will perform EOB/RW lower body dressing with minimal assistance and AE within 7 day(s). 3.  Patient will perform RW grooming with minimal assistance within 7 day(s). 4.  Patient will perform RW toilet transfers with contact guard assist within 7 day(s). 5.  Patient will perform all aspects of RW toileting with contact guard assist within 7 day(s). Outcome: Progressing Towards Goal   OCCUPATIONAL THERAPY TREATMENT/WEEKLY RE-EVALUATION  Patient: Genoveva Bridges (70 y.o. male)  Date: 1/14/2022  Diagnosis: GIB (gastrointestinal bleeding) [K92.2] <principal problem not specified>  Procedure(s) (LRB):  RIGHT LEG  VENOUS THROMBECTOMY AND INSERTION OF IVC FILTER (Right) 10 Days Post-Op  Precautions: Fall,Skin,Contact (droplet Plus)  Chart, occupational therapy assessment, plan of care, and goals were reviewed. ASSESSMENT  Based on the objective data described below, pt was seen for therapy post his HD this date. Pt was able to ambulate with postural cues and CGA/Cristian in room and in bathroom where he performed standing handwashing with close S/CGA. Pt continues to have stiffness and weakness in RLE and reported need for pain medication at end of tx session. Pt is continuing to progress towards goals and remains an excellent inpatient rehab candidate. He wants to work on stairs to get into his apt.   Pt continues to function below his independent baseline and benefits from acute OT services. .    Current Level of Function Impacting Discharge (ADLs): up to total assistance for adls (lower body) and CGA/Cristian for adl mobility using RW in room. Other factors to consider for discharge: pt was independent in adls and IADLs including driving himself to HD,  long hospitalization and Covid infection negatively impacts on pts tolerance for adls and mobility. Hospital day 19 today. PLAN :  Goals have been updated based on progression since last assessment. Patient continues to benefit from skilled intervention to address the above impairments. Continue to follow patient 3 times a week to address goals. Recommend with staff: encourage frequent position changes and ambulation to bathroom with assistance, up for meals in chair and participating in self care    Recommend next OT session: pt would benefit from AE training    Recommendation for discharge: (in order for the patient to meet his/her long term goals)  Therapy 3 hours per day 5-7 days per week    This discharge recommendation:  Has not yet been discussed the attending provider and/or case management    Equipment recommendations for successful discharge (if) home: tbd in inpatient rehab setting. SUBJECTIVE:   Patient stated it's my driving foot.    (RLE)    OBJECTIVE DATA SUMMARY:   Cognitive/Behavioral Status:  Neurologic State: Alert  Orientation Level: Oriented X4     Perception: Appears intact  Perseveration: No perseveration noted  Safety/Judgement: Awareness of environment; Fall prevention    Functional Mobility and Transfers for ADLs:  Bed Mobility:  Supine to Sit: Supervision  Scooting: Supervision    Transfers:  Sit to Stand: Minimum assistance  Functional Transfers  Bathroom Mobility: Supervision/set up;Contact guard assistance  Toilet Transfer :  (declined)  Adaptive Equipment: Walker (comment)  Bed to Chair: Contact guard assistance (ambulated around bed to chair using RW with CGA/Chris)    Balance:  Sitting: Intact  Standing: Impaired; With support  Standing - Static: Good  Standing - Dynamic : Good;Constant support  Cues for posture in standing and sitting  ADL Intervention:  Feeding  Feeding Assistance: Independent    Grooming  Grooming Assistance: Supervision;Contact guard assistance  Position Performed: Standing  Washing Hands: Supervision;Contact guard assistance  Adaptive Equipment:  (RW)                   Lower Body Dressing Assistance  Leg Crossed Method Used:  (able to cross LLE, but not RLE due to pain)  Adaptive Equipment Used:  (would benefit from AE for lower body adls.  )         Cognitive Retraining  Safety/Judgement: Awareness of environment; Fall prevention    Pain:  RLE--no complaints during tx session, but was going to request pain med from nursing who was present in room at end of tx session.       Activity Tolerance:   Fair and VSS    After treatment patient left in no apparent distress:   Sitting in chair    COMMUNICATION/COLLABORATION:   The patients plan of care was discussed with: Physical Therapist and Registered Nurse    Demetris Bowen OTR/L  Time Calculation: 27 mins

## 2022-01-14 NOTE — PROGRESS NOTES
Occupational Therapy  Will defer OT as pt is receiving dialysis. Will continue to follow as appropriate.

## 2022-01-14 NOTE — PROGRESS NOTES
Hospitalist Progress Note    NAME: Breanna Silvestre   :  1975   MRN:  [id number]       Assessment / Plan: This is a 68-year-old male with a history of dilated, end-stage renal disease, on hemodialysis , Streptococcus intermedius bacteremia in 2021, status post IV thank stopped 2021, gastric varices with upper GI bleed, thrombocytopenia, transaminitis, previous Covid pneumonia, chronically anticoagulated with warfarin for recurrent DVT, acute on chronic systolic heart failure with an EF of 35%, history of V. tach status post AICD, CAD, severe pulmonary hypertension who presented to the emergency room  with complaints of epigastric abdominal pain and vomiting throughout the day. He was discharged  for the same symptoms having received an EGD with banding of esophageal varices as well as COVID-19 pneumonia. Patient had an unwitnessed fall in the emergency room. He received CT of the head and C-spine both of which were negative for any acute process. He was admitted to the ICU for management of an upper GI bleed and intubated. He transferred out of the ICU . Covid 19 positive   Fevers, recurrent   Severe Sepsis of unknown origin  Hypotension   Patient spiked fever overnight,  highest 102.8, was tachycardic with heart rate between 101-120. Sepsis protocol initiated  Lactic acid 2.5, repeat lactic acid until his resolution.   Lactic acid resolved after IV fluid  ivf bolus 250 cc, and albumin  Continue with IV antibiotics cefepime and vancomycin  chest x-ray negative for pneumonia  Blood cultures negative to date  Unclear source of the sepsis but most likely his fever episodes are secondary to COVID-19  Will send another procalcitonin and if trending down we can give a trial of antibiotics          Hemorrhagic shock secondary to acute blood loss anemia, complicated by anemia of CKD and thrombocytopenia, secondary to upper GI bleed, bleeding gastric varices  Acute on chronic anemia   -Status post 13 units of PRBCs, 3 units of plasma, 2 units of cryoprecipitate, 3 units of platelets, pressors, octreotide drip, PPI  -Status post 4 EGDs, most recent EGD here December 26, which showed 2 healing esophageal ulcers without stigmata, large gastric varices in the stomach, with 1 gastric varices oozing blood status post epinephrine injection, 2 hemoclips. -Was felt to not be a candidate for TIPS due to severe right heart failure  -He was reintubated December 28 for IR evaluation for BRTO December 28, but he was found to have a large gastric renal shunt which would deem the procedure to the unlikely of any benefit, if even possible, making TIPS the only viable option  -TIPS procedure attempted 12/30 but his portal pressures were not consistent with portal hypertension, therefore he was not a candidate  -Liver was found to be normal on ultrasound and CT was negative for signs of cirrhosis  -? Passive congestion from right-sided heart failure causing acute liver decompensation but has improved, but patient is too high risk to do a liver biopsy currently  -Appreciate recommendations from GI and IR  - Per GI: Serial H&H, advance diet, if he rebleeds, could possibly need a repeat EGD to rule out Dieulafoy lesion of stomach  -Continue pantoprazole, Carafate  -01/08: hb stable around 8 , no bleeding  01/09: Hemoglobin 7.4  01/10: Hemoglobin 6.7, a unit of PRBC given this morning  01/11: Hemoglobin 7.6  01/13: Hemoglobin 6.8, given a unit of PRBC  01/14: Hemoglobin 8.7 today with no evidence of active bleeding.   Continue to follow CBC closely    Acute hypoxic respiratory failure status post extubation  -Intubated December 28, extubated December 29  Currently continue to be on room air for more than 48 hours      ESRD on hemodialysis, status post renal transplant x2  -Nephrology on board, appreciate the recommendations.  -Continue Retacrit    Heart failure reduced EF, not in exacerbation  -Last EF 35% (November 2021)  -Not currently on a beta-blocker or an ACE inhibitor for unknown reason  - Most recent cardiology note on December 2 recommended metoprolol succinate 12.5 mg, will start that    History of HIT  -Avoid heparin  -SCDs contraindicated due to PAD     Extensive right lower extremity DVT status post thrombectomy and IVC filter placement  S/p s/p venous thrombectomy and IVC filter placement 1/4  -Vascular on board- appreciate the recommendations   -Duplex dated 12-30-21: Graft 1: left fem-tibial graft  The graft inflow demonstrates <50% stenosis. The proximal anastomosis demonstrates <50% stenosis. The proximal graft demonstrates <50% stenosis. The mid graft demonstrates <50% stenosis. The distal graft demonstrates <50% stenosis. The distal anastomosis demonstrates <50% stenosis. The graft outflow demonstrates severe (>75%) stenosis. The mid, distal and outflow graft flow is monophasic. The inflow and proximal graft flow is triphasic. -PETER 12-30-21: PVR waveforms in the right ankle consistent with moderate disease. PVR waveforms at the right metatarsal region consistent with moderate disease. PVR waveforms in the left ankle consistent with moderate disease. PVR waveforms of the left metatarsal region consistent with severe disease. Unable to obtain left brachial pressure due to AV Fistula. Ankle/metatarsal pressures only, patient has LLE bypass graft. Right DP is non compressible, RLE appears moderate. Left PT is non compressible, LLE appears severe with absent digit waveform and absent pulses. LLE bypass graft appears patent. Left PTA proximal appears occluded.  Left JOEY proximal appears monophasic with a velocity of 11 cm/s.  01/12: Reviewed vascular surgery input, no indication for further thrombectomy, patient will continue the blood clots ,has IVC filter placed  01/11  Repeat right lower extremity Doppler showed persistent clots, some clot occlusive, not. Reconsulted vascular surgery for input to see if need for repeat thrombectomy  01/10: Patient continues to complain of pain on his left lower extremity associated with swelling which he Thinks is worse than when he came in  Repeat Doppler ultrasound lower extremity on January 10 with no new DVT    Deconditioning  -PT/OT    Chronic pain, chronically on opioids  Patient reported being in a lot of pain despite being on oxycodone  Want IV pain medication, explained to him that oral pain medication provide better pain control. c/w p.o. Dilaudid every 8 hours only given if ox Roxicodone does not provide pain relief. We will avoid IV narcotics    Wounds to left second and third great toes  -Continue wound care: wash with soap and water, dry completely. paint with betadine/povidine swabs  Weave a 4x4 between toes. Left arm swelling in the setting of known central venous stenosis  · Left arm swelling- s/p angioplasty of left subclavian vein  -    18.5 - 24.9 Normal weight / Body mass index is 23.82 kg/m². Estimated discharge date: January 15  Barriers: Clinical improvement and placement to [facility] versus [facility]    Code status: Full  Prophylaxis: H2B/PPI  Recommended Disposition: Home w/Family     Subjective:     Patient was seen and examined. No acute events overnight. Discussed with RN overnight events. All patient's questions were answered. \"DOING BETTER\"    Review of Systems:  Symptom Y/N Comments  Symptom Y/N Comments   Fever/Chills N   Chest Pain N    Poor Appetite N   Edema Y L LEG   Cough N   Abdominal Pain Y Chronic    Sputum N   Joint Pain N    SOB/ZAMUDIO N   Pruritis/Rash N    Nausea/vomit n   Tolerating PT/OT -    Diarrhea N   Tolerating Diet y    Constipation N   Other Y      Could NOT obtain due to:      Objective:     VITALS:   Last 24hrs VS reviewed since prior progress note.  Most recent are:  Patient Vitals for the past 24 hrs:   Temp Pulse Resp BP SpO2   01/14/22 1221 -- 84 -- 119/76 --   01/14/22 1206 -- 81 22 109/71 --   01/14/22 1151 -- 83 -- 108/66 --   01/14/22 1136 -- 80 -- 108/65 --   01/14/22 1121 -- 83 20 111/63 --   01/14/22 1106 98.6 °F (37 °C) 87 20 108/69 --   01/14/22 0900 98.6 °F (37 °C) 93 20 104/65 96 %   01/13/22 1900 98.8 °F (37.1 °C) 94 24 121/85 98 %   01/13/22 1840 99 °F (37.2 °C) 95 24 109/76 95 %   01/13/22 1820 99.1 °F (37.3 °C) 95 20 113/73 96 %   01/13/22 1800 98.4 °F (36.9 °C) 96 23 114/76 98 %   01/13/22 1740 98 °F (36.7 °C) 97 24 113/79 99 %   01/13/22 1715 98 °F (36.7 °C) 94 25 113/75 98 %   01/13/22 1700 98.1 °F (36.7 °C) 92 24 117/79 100 %   01/13/22 1645 97.3 °F (36.3 °C) 97 21 129/85 98 %   01/13/22 1630 98.4 °F (36.9 °C) 83 19 103/66 98 %   01/13/22 1611 97.9 °F (36.6 °C) 100 17 123/82 98 %   01/13/22 1518 98.5 °F (36.9 °C) 91 19 99/71 98 %       Intake/Output Summary (Last 24 hours) at 1/14/2022 1233  Last data filed at 1/14/2022 5433  Gross per 24 hour   Intake 1076.3 ml   Output --   Net 1076.3 ml        I had a face to face encounter and independently examined this patient on 1/14/2022, as outlined below:  PHYSICAL EXAM:  General: Alert, cooperative, no acute distress    EENT:  Anicteric sclerae. MMM  Resp:  Diminished bilaterally, no wheezing or rales  CV:  Regular  rhythm, no murmurs, rubs or gallops  GI:  Soft, Non distended,  \  +Bowel sounds  Neurologic:  Alert and oriented X 3, normal speech,   Psych:   Good insight. Not anxious nor agitated  Skin:  No rashes. No jaundice.  Dressing to LUE fistula CDI  MSK:  Moves all extremities  PVS:   Palpable pulses, left leg and left arm with significant non-pitting edema    Reviewed most current lab test results and cultures  YES  Reviewed most current radiology test results   YES  Review and summation of old records today    NO  Reviewed patient's current orders and MAR    YES  PMH/SH reviewed - no change compared to H&P  ________________________________________________________________________  Care Plan discussed with:    Comments   Patient Y    Family      RN Y    Care Manager     Consultant                        Multidiciplinary team rounds were held today with , nursing, pharmacist and clinical coordinator. Patient's plan of care was discussed; medications were reviewed and discharge planning was addressed. ________________________________________________________________________      Comments   >50% of visit spent in counseling and coordination of care Y    ________________________________________________________________________  Phil Still MD     Procedures: see electronic medical records for all procedures/Xrays and details which were not copied into this note but were reviewed prior to creation of Plan. LABS:  I reviewed today's most current labs and imaging studies.   Pertinent labs include:  Recent Labs     01/14/22  0001 01/13/22  0259 01/12/22  0020   WBC 10.3 9.5 11.2*   HGB 8.7* 6.8* 7.9*   HCT 26.1* 21.1* 25.4*    141* 158     Recent Labs     01/14/22  0001 01/13/22  0259 01/12/22  0020    137 133*   K 3.8 3.7 3.9   CL 95* 96* 94*   CO2 33* 35* 32   GLU 86 71 103*   BUN 27* 21* 37*   CREA 4.97* 3.95* 5.47*   CA 8.1* 8.4* 8.3*   PHOS 3.5  --  3.3   ALB 1.4*  --  1.6*       Signed: Phil Still MD

## 2022-01-14 NOTE — PROGRESS NOTES
Problem: Falls - Risk of  Goal: *Absence of Falls  Description: Document Mary Marking Fall Risk and appropriate interventions in the flowsheet. Outcome: Progressing Towards Goal  Note: Fall Risk Interventions:  Mobility Interventions: Bed/chair exit alarm,Patient to call before getting OOB,PT Consult for mobility concerns,Strengthening exercises (ROM-active/passive),Utilize walker, cane, or other assistive device    Mentation Interventions: Bed/chair exit alarm,Adequate sleep, hydration, pain control,More frequent rounding,Toileting rounds    Medication Interventions: Bed/chair exit alarm,Evaluate medications/consider consulting pharmacy,Teach patient to arise slowly,Patient to call before getting OOB    Elimination Interventions: Call light in reach,Patient to call for help with toileting needs,Stay With Me (per policy),Toileting schedule/hourly rounds,Bed/chair exit alarm    History of Falls Interventions: Bed/chair exit alarm         Problem: Falls - Risk of  Goal: *Absence of Falls  Description: Document Mary Marking Fall Risk and appropriate interventions in the flowsheet.   Outcome: Progressing Towards Goal  Note: Fall Risk Interventions:  Mobility Interventions: Bed/chair exit alarm,Patient to call before getting OOB,PT Consult for mobility concerns,Strengthening exercises (ROM-active/passive),Utilize walker, cane, or other assistive device    Mentation Interventions: Bed/chair exit alarm,Adequate sleep, hydration, pain control,More frequent rounding,Toileting rounds    Medication Interventions: Bed/chair exit alarm,Evaluate medications/consider consulting pharmacy,Teach patient to arise slowly,Patient to call before getting OOB    Elimination Interventions: Call light in reach,Patient to call for help with toileting needs,Stay With Me (per policy),Toileting schedule/hourly rounds,Bed/chair exit alarm    History of Falls Interventions: Bed/chair exit alarm         Problem: Falls - Risk of  Goal: *Absence of Falls  Description: Document Mary Marking Fall Risk and appropriate interventions in the flowsheet.   Outcome: Progressing Towards Goal  Note: Fall Risk Interventions:  Mobility Interventions: Bed/chair exit alarm,Patient to call before getting OOB,PT Consult for mobility concerns,Strengthening exercises (ROM-active/passive),Utilize walker, cane, or other assistive device    Mentation Interventions: Bed/chair exit alarm,Adequate sleep, hydration, pain control,More frequent rounding,Toileting rounds    Medication Interventions: Bed/chair exit alarm,Evaluate medications/consider consulting pharmacy,Teach patient to arise slowly,Patient to call before getting OOB    Elimination Interventions: Call light in reach,Patient to call for help with toileting needs,Stay With Me (per policy),Toileting schedule/hourly rounds,Bed/chair exit alarm    History of Falls Interventions: Bed/chair exit alarm

## 2022-01-14 NOTE — PROGRESS NOTES
Physical Therapy Note:  Chart reviewed in preparation for intervention. Patient currently unavailable d/t dialysis. Will defer and follow up as able.   Thank you,  Joanne Patel, PT, DPT

## 2022-01-14 NOTE — PROGRESS NOTES
Nephrology Progress Note  Blaise Leblanc     [email or web address]  Phone - [phone]   Patient: Augustina Enriquez    YOB: 1975        Date- 1/14/2022   Admit Date: 12/26/2021  CC: Follow up for ESRD       IMPRESSION & PLAN:   · ESRD - home HD 5 days per week- Follows up with Dr Amy Ferraro at [facility]  · Covid 19 infection  · Right leg ileofemoral DVT -s/p venous thrombectomy and IVC filter placement 1/4  · H/o esophageal bleed from esophagitis  · Left arm swelling- s/p angioplasty of left subclavian vein  · Gram positive sepsis from dental abcess  · s/p lead extraction at [facility]  · Anemia of ckd  · Hx of renal tx in past times 2.  · Hypertension  · CAD  · Failed RTX times 2  · H/o DVT, s/p ivc filter/ h/o HIT      PLAN-   SEEN ON HD  today   Continue epogen  D/w hd nurse   Subjective: Interval History:   bp stable    SEEN ON HD TODAY    Objective:   Vitals:    01/14/22 1321 01/14/22 1336 01/14/22 1351 01/14/22 1359   BP: 113/75 110/70 (!) 140/73    Pulse: 85 78 96    Resp:    22   Temp:       TempSrc:       SpO2:       Weight:       Height:          01/13 0701 - 01/14 0700  In: 856.3 [P.O.:220]  Out: -   Last 3 Recorded Weights in this Encounter    01/09/22 0605 01/12/22 0629 01/13/22 0546   Weight: 63 kg (139 lb) 68.2 kg (150 lb 6.4 oz) 69 kg (152 lb 1.9 oz)      Physical exam:     GEN: NAD  NECK- Supple  RESP: NO DISTRESS  NEURO:non focal      Due to the COVID-19 pandemic, in order to reduce the spread and transmission of the virus, some basic elements of the physical exam have been deferred to reduce direct or close contact with the patient . Chart reviewed. Pertinent Notes reviewed.      Data Review :  Recent Labs     01/14/22  0001 01/13/22  0259 01/12/22  0020    137 133*   K 3.8 3.7 3.9   CL 95* 96* 94*   CO2 33* 35* 32   BUN 27* 21* 37*   CREA 4.97* 3.95* 5.47*   GLU 86 71 103*   CA 8.1* 8.4* 8.3*   PHOS 3.5  --  3.3     Recent Labs 01/14/22  0001 01/13/22  0259 01/12/22  0020   WBC 10.3 9.5 11.2*   HGB 8.7* 6.8* 7.9*   HCT 26.1* 21.1* 25.4*    141* 158     No results for input(s): FE, TIBC, PSAT, FERR in the last 72 hours.    Medication list  reviewed  Current Facility-Administered Medications   Medication Dose Route Frequency    metoprolol tartrate (LOPRESSOR) tablet 12.5 mg  12.5 mg Oral Q12H    0.9% sodium chloride infusion 250 mL  250 mL IntraVENous PRN    acetaminophen (TYLENOL) tablet 650 mg  650 mg Oral Q4H PRN    Or    acetaminophen (TYLENOL) suppository 650 mg  650 mg Rectal Q4H PRN    HYDROmorphone (DILAUDID) tablet 2 mg  2 mg Oral Q8H PRN    piperacillin-tazobactam (ZOSYN) 3.375 g in 0.9% sodium chloride (MBP/ADV) 100 mL MBP  3.375 g IntraVENous Q12H    VANCOMYCIN INFORMATION NOTE   Other Rx Dosing/Monitoring    oxyCODONE IR (ROXICODONE) tablet 10 mg  10 mg Oral Q4H PRN    And    acetaminophen (TYLENOL) tablet 325 mg  325 mg Oral Q4H PRN    sucralfate (CARAFATE) tablet 1 g  1 g Oral AC&HS    pantoprazole (PROTONIX) tablet 40 mg  40 mg Oral ACB&D    alum-mag hydroxide-simeth (MYLANTA) oral suspension 30 mL  30 mL Oral Q4H PRN    balsam peru-castor oiL (VENELEX) ointment   Topical BID    sodium chloride (NS) flush 5-40 mL  5-40 mL IntraVENous Q8H    sodium chloride (NS) flush 5-40 mL  5-40 mL IntraVENous PRN    polyethylene glycol (MIRALAX) packet 17 g  17 g Oral DAILY PRN    ondansetron (ZOFRAN) injection 4 mg  4 mg IntraVENous Q6H PRN    alcohol 62% (NOZIN) nasal  1 Ampule  1 Ampule Topical Q12H    epoetin emilie-epbx (RETACRIT) injection 20,000 Units  20,000 Units SubCUTAneous Q MON, WED & FRI          Blanca Raymundo MD              [facility]  [facility] [address]  [place], [address]  Phone - [phone]               Fax - [phone]

## 2022-01-14 NOTE — DIALYSIS
Hemodialysis / [phone]    Vitals Pre Post Assessment Pre Post   BP BP: 108/69 (01/14/22 1106) 133/78   LOC Alert and oriented x 3 Alert and oriented x 3   HR Pulse (Heart Rate): 87 (01/14/22 1106) 87   Lungs Room air, no shortness of breath Room air, no shortness of breath   Resp Resp Rate: 20 (01/14/22 1106) 20   Cardiac regular regular   Temp Temp: 98.6 °F (37 °C) (01/14/22 1106) 98.6     Skin Warm/dry, dressed wound to right leg Warm/dry, dressed wound to right leg   Weight Pre-Dialysis Weight:  (bedscale not calibrated) (01/14/22 1106)  Edema Facial, generalized 1+ Facial, generalized 1+   Tele status   Pain Pain Intensity 1: 7 (01/14/22 0918) 2/10     Orders   Duration: Start: 1106 End: 1359 Total: 2.9 hours   Dialyzer: Dialyzer/Set Up Inspection: Revaclear (01/14/22 1106)   K Bath: Dialysate K (mEq/L): 2 (01/14/22 1106)   Ca Bath: Dialysate CA (mEq/L): 3.0 (01/14/22 1106)   Na: Dialysate NA (mEq/L): 140 (01/14/22 1106)   Bicarb: Dialysate HCO3 (mEq/L): 40 (01/14/22 1106)   Target Fluid Removal: Goal/Amount of Fluid to Remove (mL): 2000 mL (01/14/22 1106)     Access   Type & Location: Left upper arm AV graft   Comments: +bruit, +thrill disinfected per p&p and cannulated with 15g needles x 2 without issue. Arterial needle top portion, venous needle lower portion, needle tips facing opposite direction, good flows throughout treatment.  At end of HD all possible blood returned, needles removed, pressure held to sites until hemostasis achieved, gauze and tape to sites, +bruit, +thrill                                       Labs   HBsAg (Antigen) / date: Negative 01/07/22                                             HBsAb (Antibody) / date: Immune 01/07/22   Source: Connect [facility]   Obtained/Reviewed  Critical Results Called HGB   Date Value Ref Range Status   01/14/2022 8.7 (L) 12.1 - 17.0 g/dL Final     Potassium   Date Value Ref Range Status   01/14/2022 3.8 3.5 - 5.1 mmol/L Final     Calcium   Date Value Ref Range Status   01/14/2022 8.1 (L) 8.5 - 10.1 MG/DL Final     BUN   Date Value Ref Range Status   01/14/2022 27 (H) 6 - 20 MG/DL Final     Creatinine   Date Value Ref Range Status   01/14/2022 4.97 (H) 0.70 - 1.30 MG/DL Final     Comment:     INVESTIGATED PER DELTA CHECK PROTOCOL        Meds Given   Name Dose Route   None ordered with HD                 Adequacy / Fluid    Total Liters Process: 66.7 liters   Net Fluid Removed: 1.6 liters      Comments   Time Out Done:   (Time) Yes 1055   Admitting Diagnosis: Hemorrhagic shock secondary to acute blood loss anemia, ESRD   Consent obtained/signed: Informed Consent Verified: Yes (01/14/22 1106)   Machine / Rios Stacy # Machine Number: [id number] (01/14/22 1106)   Primary Nurse Rpt Pre: Fede Guzman RN   Primary Nurse Rpt Post: Fede Guzman RN   Pt Education: Procedural, access care, home hemo   Care Plan: Continue HD on MWF while inpatient per MD order   Pts outpatient clinic: [address] home hemo patient     Tx Summary   Comments: Tolerated HD well. Medicated for pain 7/10 leg by primary RN Roxicodone 10mg PO at 1245. At 1315 patient resting comfortably with eyes closed. Treatment terminated early due to clotting venous chamber. MD notified. No additional orders received. Anticoagulation contraindicated, therefore  recommends intermittent saline flushes throughout treatment.

## 2022-01-14 NOTE — PROGRESS NOTES
Problem: Mobility Impaired (Adult and Pediatric)  Goal: *Acute Goals and Plan of Care (Insert Text)  Description: FUNCTIONAL STATUS PRIOR TO ADMISSION: Patient was modified independent using a rollator for functional mobility. Patient was independent for basic and instrumental ADLs. HOME SUPPORT PRIOR TO ADMISSION: The patient lived with sister but did not require assist. Lives in second floor duplex with 15 steps to enter home. Physical Therapy Goals  Initiated 1/6/2022; reviewed and continue 1/14/2022   1. Patient will move from supine to sit and sit to supine , scoot up and down, and roll side to side in bed with modified independence within 7 day(s). 2.  Patient will transfer from bed to chair and chair to bed with modified independence using the least restrictive device within 7 day(s). 3.  Patient will perform sit to stand with modified independence within 7 day(s). 4.  Patient will ambulate with supervision/set-up for 90 feet with the least restrictive device within 7 day(s). 5.  Patient will ascend/descend 15 stairs with 1 handrail(s) with minimal assistance/contact guard assist within 7 day(s). Outcome: Progressing Towards Goal   PHYSICAL THERAPY TREATMENT: WEEKLY REASSESSMENT  Patient: Yoanna Muller (26 y.o. male)  Date: 1/14/2022  Primary Diagnosis: GIB (gastrointestinal bleeding) [K92.2]  Procedure(s) (LRB):  RIGHT LEG  VENOUS THROMBECTOMY AND INSERTION OF IVC FILTER (Right) 10 Days Post-Op   Precautions:   Fall,Skin,Contact (droplet Plus)      ASSESSMENT  Patient continues with skilled PT services and is progressing towards goals. He remains limited by impaired ROM, balance, generalized weakness, and right LE pain following admission for a GIB, right LE thrombectomy, and COVID. Patient demonstrating improved overall activity tolerance and able to increased gait distance this date. He continues to c/o right LE pain in WB with visible edema and delayed knee extension in stance. Encouraged active muscle use and ROM of right LE to improve strength and reduce edema. He remains below his functional baseline after a 3 week hospitalization. Continue to recommend discharge to IP rehab to maximize his functional recovery. Patient's progression toward goals since last assessment: improved gait tolerance and able to ambulate 2x20' this date    Current Level of Function Impacting Discharge (mobility/balance): minimal assist for gait and cues for sequencing      Other factors to consider for discharge: 15 steps to enter his home         PLAN :  Goals have been updated based on progression since last assessment. Patient continues to benefit from skilled intervention to address the above impairments. Recommendations and Planned Interventions: bed mobility training, transfer training, gait training, therapeutic exercises, neuromuscular re-education and therapeutic activities      Frequency/Duration: Patient will be followed by physical therapy:  3 times a week to address goals. Recommendation for discharge: (in order for the patient to meet his/her long term goals)  Therapy 3 hours per day 5-7 days per week        IF patient discharges home will need the following DME: to be determined (TBD)         SUBJECTIVE:   Patient stated This leg hurts when I straighten it.     OBJECTIVE DATA SUMMARY:   HISTORY:    Past Medical History:   Diagnosis Date    Abdominal hematoma     AICD (automatic cardioverter/defibrillator) present     CAD (coronary artery disease)     anterior MI s/p 2 stents  2007    Chronic abdominal pain     Chronic kidney disease     ESRD; Dialysis dependent.  Home [facility] does labs- [facility] tpke    DVT (deep venous thrombosis) ([address] 2001    DVT of popliteal vein ([facility] [address] 11/2011    not anticoagulated due to bleeding, IVC filter    Endocarditis     Gallstone pancreatitis     Gastrointestinal disorder     acid reflux    Gastrointestinal disorder     peptic ulcer    Hemodialysis [facility] ([facility] [address]     High cholesterol     Hypertension     Kidney transplant     b/l kidneys 1995, 2001    Long term current use of anticoagulant therapy     Nephrotic syndrome     Other ill-defined conditions(799.89)     kidny transplant x2,  on dialysis    Other ill-defined conditions(799.89)     home dialysis    Other ill-defined conditions(799.89)     hx recurrent left leg DVT    Peritonitis ([facility] [address]     Seizures ([facility] [address] 2015    most recent- only had three in lifetime    Small bowel obstruction (HCC)     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[facility] [address]     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    V-tach ([facility] [address]      Past Surgical History:   Procedure Laterality Date    HX APPENDECTOMY      HX CHOLECYSTECTOMY      HX OTHER SURGICAL  11/2011    exploratory laparotomy    HX OTHER SURGICAL      fem-pop    HX OTHER SURGICAL  02/2013    right upper extremity fistula    HX PACEMAKER Left 6/2009    AICD-st latonya-not connected    HX PACEMAKER Left     AICD-left side-BS-working    HX SMALL BOWEL RESECTION      HX TRANSPLANT  2001     Kidney    HX TRANSPLANT  1992    kidney    HX VASCULAR ACCESS Right     femoral access for HD- does 5 day dialysis @ home    IR INSERT NON TUNL CVC OVER 5 YRS  12/7/2021    IR INSERT NON TUNL CVC OVER 5 YRS  12/10/2021    IR INSERT TIPS HEPATIC SHUNT  12/30/2021    IR REMOVE TUNL CVAD W/O PORT / PUMP  10/29/2020    IR REPLACE CVC TUNNELED W/O PORT  9/10/2020    NE CARDIAC SURG PROCEDURE UNLIST  2007    2 stents    NE EDG US EXAM SURGICAL ALTER STOM DUODENUM/JEJUNUM  7/15/2011         NE ESOPHAGOGASTRODUODENOSCOPY TRANSORAL DIAGNOSTIC  5/24/2012         UPPER GI ENDOSCOPY,CTRL BLEED  12/6/2021         UPPER GI ENDOSCOPY,DIAGNOSIS  12/8/2021         VASCULAR SURGERY PROCEDURE UNLIST  11/14/2017    Insertion AV graft right upper arm (bovine); ligation of AV fistula right arm       Personal factors and/or comorbidities impacting plan of care:     Home Situation  Home Environment: Private residence (Duplex-resides upstairs)  # Steps to Enter: 15  Rails to Enter: Yes  Hand Rails : Right  One/Two Story Residence: One story (2nd floor of duplex)  Living Alone: No  Support Systems: Other Family Member(s) (sister)  Patient Expects to be Discharged to[de-identified] Rehabilitation facility  Current DME Used/Available at Home: [employer] [place], rollator  Tub or Shower Type: Tub/Shower combination    EXAMINATION/PRESENTATION/DECISION MAKING:   Critical Behavior:  Neurologic State: Alert  Orientation Level: Oriented X4  Cognition: Appropriate decision making,Appropriate for age attention/concentration,Appropriate safety awareness  Safety/Judgement: Awareness of environment,Home safety,Insight into deficits  Hearing: Auditory  Auditory Impairment: None  Hearing Aids/Status: Does not own  Skin:    Edema:   Functional Mobility:  Bed Mobility:     Supine to Sit: Supervision     Scooting: Supervision  Transfers:  Sit to Stand: Minimum assistance  Stand to Sit: Contact guard assistance                       Balance:   Sitting: Intact  Standing: Impaired; With support  Standing - Static: Good  Standing - Dynamic : Good;Constant support  Ambulation/Gait Training:  Distance (ft): 20 Feet (ft) (x2)  Assistive Device: Gait belt;Walker, rolling (with a seated rest between)  Ambulation - Level of Assistance: Minimal assistance        Gait Abnormalities: Decreased step clearance;Trunk sway increased           Stance: Right decreased  Speed/Bernarda: Slow  Step Length: Left shortened                     Stairs:               Therapeutic Exercises:   Encouraged seated LAQs and ankle pumps  Recommended to place right LE in full extension in supine and to complete ankle pumps to tolerance    Functional Measure:        Pain Rating:      Activity Tolerance:   Good    After treatment patient left in no apparent distress:   Sitting in chair and Call bell within reach    COMMUNICATION/EDUCATION:   The patients plan of care was discussed with: Ethan nurse.     Fall prevention education was provided and the patient/caregiver indicated understanding., Patient/family have participated as able in goal setting and plan of care. and Patient/family agree to work toward stated goals and plan of care.     Thank you for this referral.  Ninfa Zuniga, PT, DPT   Time Calculation: 25 mins

## 2022-01-15 LAB — PROCALCITONIN SERPL-MCNC: 3.18 NG/ML

## 2022-01-15 PROCEDURE — 74011250637 HC RX REV CODE- 250/637: Performed by: SURGERY

## 2022-01-15 PROCEDURE — 74011000258 HC RX REV CODE- 258: Performed by: INTERNAL MEDICINE

## 2022-01-15 PROCEDURE — 74011250637 HC RX REV CODE- 250/637: Performed by: INTERNAL MEDICINE

## 2022-01-15 PROCEDURE — 74011250637 HC RX REV CODE- 250/637: Performed by: NURSE PRACTITIONER

## 2022-01-15 PROCEDURE — 65270000029 HC RM PRIVATE

## 2022-01-15 PROCEDURE — 36415 COLL VENOUS BLD VENIPUNCTURE: CPT

## 2022-01-15 PROCEDURE — 84145 PROCALCITONIN (PCT): CPT

## 2022-01-15 PROCEDURE — 74011000250 HC RX REV CODE- 250: Performed by: SURGERY

## 2022-01-15 PROCEDURE — 74011250636 HC RX REV CODE- 250/636: Performed by: INTERNAL MEDICINE

## 2022-01-15 RX ORDER — MORPHINE SULFATE 2 MG/ML
2 INJECTION, SOLUTION INTRAMUSCULAR; INTRAVENOUS ONCE
Status: COMPLETED | OUTPATIENT
Start: 2022-01-15 | End: 2022-01-16

## 2022-01-15 RX ADMIN — OXYCODONE 10 MG: 5 TABLET ORAL at 16:14

## 2022-01-15 RX ADMIN — METOPROLOL TARTRATE 12.5 MG: 25 TABLET, FILM COATED ORAL at 09:32

## 2022-01-15 RX ADMIN — PANTOPRAZOLE SODIUM 40 MG: 40 TABLET, DELAYED RELEASE ORAL at 09:32

## 2022-01-15 RX ADMIN — PIPERACILLIN AND TAZOBACTAM 3.38 G: 3; .375 INJECTION, POWDER, LYOPHILIZED, FOR SOLUTION INTRAVENOUS at 03:53

## 2022-01-15 RX ADMIN — SUCRALFATE 1 G: 1 TABLET ORAL at 12:07

## 2022-01-15 RX ADMIN — METOPROLOL TARTRATE 12.5 MG: 25 TABLET, FILM COATED ORAL at 22:51

## 2022-01-15 RX ADMIN — SODIUM CHLORIDE, PRESERVATIVE FREE 10 ML: 5 INJECTION INTRAVENOUS at 22:58

## 2022-01-15 RX ADMIN — HYDROMORPHONE HYDROCHLORIDE 2 MG: 2 TABLET ORAL at 03:28

## 2022-01-15 RX ADMIN — SUCRALFATE 1 G: 1 TABLET ORAL at 09:32

## 2022-01-15 RX ADMIN — Medication: at 09:33

## 2022-01-15 RX ADMIN — Medication 1 AMPULE: at 09:32

## 2022-01-15 RX ADMIN — HYDROMORPHONE HYDROCHLORIDE 2 MG: 2 TABLET ORAL at 12:07

## 2022-01-15 RX ADMIN — HYDROMORPHONE HYDROCHLORIDE 2 MG: 2 TABLET ORAL at 21:15

## 2022-01-15 RX ADMIN — SODIUM CHLORIDE, PRESERVATIVE FREE 30 ML: 5 INJECTION INTRAVENOUS at 15:15

## 2022-01-15 RX ADMIN — PANTOPRAZOLE SODIUM 40 MG: 40 TABLET, DELAYED RELEASE ORAL at 16:14

## 2022-01-15 RX ADMIN — SUCRALFATE 1 G: 1 TABLET ORAL at 16:14

## 2022-01-15 RX ADMIN — SUCRALFATE 1 G: 1 TABLET ORAL at 21:15

## 2022-01-15 RX ADMIN — SODIUM CHLORIDE, PRESERVATIVE FREE 10 ML: 5 INJECTION INTRAVENOUS at 05:28

## 2022-01-15 RX ADMIN — Medication: at 21:19

## 2022-01-15 RX ADMIN — PIPERACILLIN AND TAZOBACTAM 3.38 G: 3; .375 INJECTION, POWDER, LYOPHILIZED, FOR SOLUTION INTRAVENOUS at 15:14

## 2022-01-15 RX ADMIN — ACETAMINOPHEN 650 MG: 325 TABLET ORAL at 21:15

## 2022-01-15 NOTE — PROGRESS NOTES
Comprehensive Nutrition Assessment    Type and Reason for Visit: Reassess    Nutrition Recommendations/Plan:   · Continue Low K+ diet. RD removed Phos restriction for now as labs WNL and in effort to optimize po/nutritional intake. Will monitor trends and adjust as needed. · Continue Nepro shakes po TID with meals. Nutrition Assessment:     1/15: Chart reviewed; med noted for COVID GIB. Hx of ESRD-HD. Pt continues with decreased overall po intake but drinking Nepro shakes per po documentation. CM continues to work on placement. No new nutrition needs identified at this time. Patient Vitals for the past 168 hrs:   % Diet Eaten   01/15/22 0821 1 - 25%   01/14/22 1825 1 - 25%   01/11/22 1044 26 - 50%   01/11/22 0700 51 - 75%   01/10/22 1445 51 - 75%   01/10/22 0715 76 - 100%   01/09/22 1437 76 - 100%   01/09/22 0730 26 - 50%     Patient Vitals for the past 168 hrs:   Supplement intake %   01/14/22 1825 76 - 100%     Estimated Daily Nutrient Needs:  Energy (kcal): 1951 (BMR 1501 x 1. 3AF); Weight Used for Energy Requirements: Current  Protein (g): 65-78 (1.0-1.2 g/kg bw); Weight Used for Protein Requirements: Current  Fluid (ml/day): 4511-6603 ml/day; Method Used for Fluid Requirements: 1 ml/kcal    Nutrition Related Findings:  Labs: reviewed; Meds: reviewed      Wounds:    Pressure injury,Stage II,Deep tissue injury,Surgical incision       Current Nutrition Therapies:  ADULT ORAL NUTRITION SUPPLEMENT Breakfast, Lunch, Dinner; Renal Supplement  ADULT DIET Regular; Low Potassium (Less than 3000 mg/day); Buttered pecan Nepro please! Anthropometric Measures:  · Height:  5' 7\" (170.2 cm)  · Current Body Wt:  63 kg (138 lb 14.2 oz)   · Ideal Body Wt:  148 lbs:  97.3 %   · Adjusted Body Weight:   ; Weight Adjustment for: No adjustment   · BMI Category:  Normal weight (BMI 18.5-24. 9)       Nutrition Diagnosis:   · Inadequate protein-energy intake related to  (decreased appetite) as evidenced by intake 0-25%,intake 26-50%    Nutrition Interventions:   Food and/or Nutrient Delivery: Continue current diet,Continue oral nutrition supplement  Nutrition Education and Counseling: No recommendations at this time  Coordination of Nutrition Care: Continue to monitor while inpatient    Goals:  Trend PO intake >50% of meals next 3-5 days       Nutrition Monitoring and Evaluation:   Behavioral-Environmental Outcomes: None identified  Food/Nutrient Intake Outcomes: Food and nutrient intake,Supplement intake  Physical Signs/Symptoms Outcomes: Biochemical data,GI status,Weight,Skin    Discharge Planning:    Continue current diet,Continue oral nutrition supplement     Electronically signed by Rosette Peng RD on 1/15/2022 at 4:53 PM

## 2022-01-15 NOTE — PROGRESS NOTES
Pharmacy Antimicrobial Kinetic Dosing    Indication for Antimicrobials: Sepsis of unknown etiology     Current Regimen of Each Antimicrobial:  Vancomycin 1000 mg IV post-HD (Start Date ; Day 8)  Pip-Tazo 3.375 g IV Q12H (Start Date ; Day 8)    Previous Antimicrobial Therapy:    Goal Vancomycin Trough: 20 - 25 mcg/mL    Date Dose & Interval Measured (mcg/mL) Predicted AUC/ZECHARIAH    750 mg post-HD 16.8     1000 mg post-HD 24.1     am labs        Significant Positive Cultures:    Blood - NG    Conditions for Dosing Consideration: Hemodialysis    Labs:  Recent Labs     01/15/22  0311 22  0001 22   CREA  --  4.97* 3.95*   BUN  --  27* 21*   PCT 3.18  --   --      Recent Labs     22  0001 22   WBC 10.3 9.5     Temp (24hrs), Av °F (37.2 °C), Min:98.5 °F (36.9 °C), Max:99.6 °F (37.6 °C)        Creatinine Clearance (mL/min):   CrCl (Ideal Body Weight): 17.7   If actual weight < IBW: CrCl (Actual Body Weight) 18.5    Impression/Plan:   Cx no ID GPC or BCs ordered; WBC 10.3, no bands and afebrile  Strep Intermedius bacteremia 2021 stopped 21  Continue Vancomycin and Zosyn HD regimens  Plan Vanc 1000 mg IV after HD    Recommend consider dc Vanc and Zosyn given day 8 and vitals, and lab/micro findings  Antimicrobial stop date TBD     Pharmacy will follow daily and adjust medications as appropriate for renal function and/or serum levels.     Thank you,  Britany Fontenot, [facility]

## 2022-01-15 NOTE — PROGRESS NOTES
End of Shift Note    Bedside shift change report given to [facility] (oncoming nurse) by Fede Guzman (offgoing nurse). Report included the following information SBAR, Kardex, Procedure Summary, Intake/Output, MAR and Recent Results    Shift worked:  7-7pm     Shift summary and any significant changes:     No significant changes.    l     Concerns for physician to address:      Zone phone for oncoming shift:            Fede Guzman

## 2022-01-15 NOTE — PROGRESS NOTES
Transition of Care Plan:     RUR: 30%   Disposition: [facility] or [facility] (waiting on bed availability)  Follow up appointments: Follow up with PCP and/or Specialist   DME needed: N/A  Transportation at Discharge: Family to assist   [place] or means to access home: Family will provide        IM Medicare Letter: 2nd IM Medicare Letter to be given   Is patient a BCPI-A Bundle:   N/A                  If yes, was Bundle Letter given?:    Is patient a [place] and connected with the [facility]? N/A  If yes, was [facility] transfer form completed and [facility] notified? Caregiver Contact: Sparkle Fernandez (sister) [phone] or Oniel Varner (family member) [phone]  Discharge Caregiver contacted prior to discharge?  Family to be contacted    11:41AM UPDATE  CM received call from [facility], [phone]) reporting that they still don't have any bed availability for [place] but do have one bed available in [place]. CM spoke with pt via room phone due to COVID-19 precautions. Pt's preference is still to stay in the area and go to either [facility] or [facility] once a bed is available, declined transfer to [place]. Reports [facility] just called him earlier and he advised the same. CM will follow-up with pt once update on bed is received.      Fernando Horton [address] Manager  [phone]

## 2022-01-16 LAB
ALBUMIN SERPL-MCNC: 1.4 G/DL (ref 3.5–5)
ALBUMIN/GLOB SERPL: 0.3 {RATIO} (ref 1.1–2.2)
ALP SERPL-CCNC: 227 U/L (ref 45–117)
ALT SERPL-CCNC: <6 U/L (ref 12–78)
ANION GAP SERPL CALC-SCNC: 6 MMOL/L (ref 5–15)
AST SERPL-CCNC: 17 U/L (ref 15–37)
BILIRUB SERPL-MCNC: 0.7 MG/DL (ref 0.2–1)
BUN SERPL-MCNC: 19 MG/DL (ref 6–20)
BUN/CREAT SERPL: 4 (ref 12–20)
CALCIUM SERPL-MCNC: 8.1 MG/DL (ref 8.5–10.1)
CHLORIDE SERPL-SCNC: 97 MMOL/L (ref 97–108)
CO2 SERPL-SCNC: 33 MMOL/L (ref 21–32)
CREAT SERPL-MCNC: 5.19 MG/DL (ref 0.7–1.3)
ERYTHROCYTE [DISTWIDTH] IN BLOOD BY AUTOMATED COUNT: 17 % (ref 11.5–14.5)
GLOBULIN SER CALC-MCNC: 4.4 G/DL (ref 2–4)
GLUCOSE SERPL-MCNC: 78 MG/DL (ref 65–100)
HCT VFR BLD AUTO: 29 % (ref 36.6–50.3)
HGB BLD-MCNC: 9.4 G/DL (ref 12.1–17)
MCH RBC QN AUTO: 29 PG (ref 26–34)
MCHC RBC AUTO-ENTMCNC: 32.4 G/DL (ref 30–36.5)
MCV RBC AUTO: 89.5 FL (ref 80–99)
NRBC # BLD: 0 K/UL (ref 0–0.01)
NRBC BLD-RTO: 0 PER 100 WBC
PLATELET # BLD AUTO: 187 K/UL (ref 150–400)
PMV BLD AUTO: 11.1 FL (ref 8.9–12.9)
POTASSIUM SERPL-SCNC: 3.6 MMOL/L (ref 3.5–5.1)
PROT SERPL-MCNC: 5.8 G/DL (ref 6.4–8.2)
RBC # BLD AUTO: 3.24 M/UL (ref 4.1–5.7)
SODIUM SERPL-SCNC: 136 MMOL/L (ref 136–145)
WBC # BLD AUTO: 10 K/UL (ref 4.1–11.1)

## 2022-01-16 PROCEDURE — 74011250636 HC RX REV CODE- 250/636: Performed by: INTERNAL MEDICINE

## 2022-01-16 PROCEDURE — 74011250637 HC RX REV CODE- 250/637: Performed by: NURSE PRACTITIONER

## 2022-01-16 PROCEDURE — 74011250637 HC RX REV CODE- 250/637: Performed by: SURGERY

## 2022-01-16 PROCEDURE — 74011250637 HC RX REV CODE- 250/637: Performed by: INTERNAL MEDICINE

## 2022-01-16 PROCEDURE — 74011250636 HC RX REV CODE- 250/636: Performed by: NURSE PRACTITIONER

## 2022-01-16 PROCEDURE — 65270000029 HC RM PRIVATE

## 2022-01-16 PROCEDURE — 85027 COMPLETE CBC AUTOMATED: CPT

## 2022-01-16 PROCEDURE — 74011000250 HC RX REV CODE- 250: Performed by: SURGERY

## 2022-01-16 PROCEDURE — 80053 COMPREHEN METABOLIC PANEL: CPT

## 2022-01-16 PROCEDURE — 74011000258 HC RX REV CODE- 258: Performed by: INTERNAL MEDICINE

## 2022-01-16 RX ORDER — LEVOFLOXACIN 5 MG/ML
750 INJECTION, SOLUTION INTRAVENOUS
Status: COMPLETED | OUTPATIENT
Start: 2022-01-17 | End: 2022-01-17

## 2022-01-16 RX ORDER — LEVOFLOXACIN 5 MG/ML
500 INJECTION, SOLUTION INTRAVENOUS
Status: DISCONTINUED | OUTPATIENT
Start: 2022-01-18 | End: 2022-01-18 | Stop reason: HOSPADM

## 2022-01-16 RX ORDER — LEVOFLOXACIN 5 MG/ML
750 INJECTION, SOLUTION INTRAVENOUS ONCE
Status: COMPLETED | OUTPATIENT
Start: 2022-01-16 | End: 2022-01-16

## 2022-01-16 RX ADMIN — SUCRALFATE 1 G: 1 TABLET ORAL at 12:26

## 2022-01-16 RX ADMIN — SODIUM CHLORIDE, PRESERVATIVE FREE 30 ML: 5 INJECTION INTRAVENOUS at 14:52

## 2022-01-16 RX ADMIN — Medication: at 08:32

## 2022-01-16 RX ADMIN — SODIUM CHLORIDE, PRESERVATIVE FREE 10 ML: 5 INJECTION INTRAVENOUS at 21:39

## 2022-01-16 RX ADMIN — SUCRALFATE 1 G: 1 TABLET ORAL at 08:31

## 2022-01-16 RX ADMIN — METOPROLOL TARTRATE 12.5 MG: 25 TABLET, FILM COATED ORAL at 22:48

## 2022-01-16 RX ADMIN — HYDROMORPHONE HYDROCHLORIDE 2 MG: 2 TABLET ORAL at 16:57

## 2022-01-16 RX ADMIN — OXYCODONE 10 MG: 5 TABLET ORAL at 13:05

## 2022-01-16 RX ADMIN — SUCRALFATE 1 G: 1 TABLET ORAL at 16:57

## 2022-01-16 RX ADMIN — METOPROLOL TARTRATE 12.5 MG: 25 TABLET, FILM COATED ORAL at 08:31

## 2022-01-16 RX ADMIN — PANTOPRAZOLE SODIUM 40 MG: 40 TABLET, DELAYED RELEASE ORAL at 08:31

## 2022-01-16 RX ADMIN — HYDROMORPHONE HYDROCHLORIDE 2 MG: 2 TABLET ORAL at 08:31

## 2022-01-16 RX ADMIN — ACETAMINOPHEN 650 MG: 325 TABLET ORAL at 02:54

## 2022-01-16 RX ADMIN — Medication: at 21:00

## 2022-01-16 RX ADMIN — SUCRALFATE 1 G: 1 TABLET ORAL at 21:39

## 2022-01-16 RX ADMIN — ACETAMINOPHEN 650 MG: 325 TABLET ORAL at 21:39

## 2022-01-16 RX ADMIN — PIPERACILLIN AND TAZOBACTAM 3.38 G: 3; .375 INJECTION, POWDER, LYOPHILIZED, FOR SOLUTION INTRAVENOUS at 03:00

## 2022-01-16 RX ADMIN — PANTOPRAZOLE SODIUM 40 MG: 40 TABLET, DELAYED RELEASE ORAL at 16:57

## 2022-01-16 RX ADMIN — Medication 1 AMPULE: at 22:48

## 2022-01-16 RX ADMIN — OXYCODONE 10 MG: 5 TABLET ORAL at 02:54

## 2022-01-16 RX ADMIN — MORPHINE SULFATE 2 MG: 2 INJECTION, SOLUTION INTRAMUSCULAR; INTRAVENOUS at 00:15

## 2022-01-16 RX ADMIN — LEVOFLOXACIN 750 MG: 5 INJECTION, SOLUTION INTRAVENOUS at 12:26

## 2022-01-16 RX ADMIN — OXYCODONE 10 MG: 5 TABLET ORAL at 21:39

## 2022-01-16 NOTE — PROGRESS NOTES
Hospitalist Progress Note    NAME: Carol Rutledge   :  1975   MRN:  [id number]       Assessment / Plan: This is a 29-year-old male with a history of dilated, end-stage renal disease, on hemodialysis , Streptococcus intermedius bacteremia in 2021, status post IV thank stopped 2021, gastric varices with upper GI bleed, thrombocytopenia, transaminitis, previous Covid pneumonia, chronically anticoagulated with warfarin for recurrent DVT, acute on chronic systolic heart failure with an EF of 35%, history of V. tach status post AICD, CAD, severe pulmonary hypertension who presented to the emergency room  with complaints of epigastric abdominal pain and vomiting throughout the day. He was discharged  for the same symptoms having received an EGD with banding of esophageal varices as well as COVID-19 pneumonia. Patient had an unwitnessed fall in the emergency room. He received CT of the head and C-spine both of which were negative for any acute process. He was admitted to the ICU for management of an upper GI bleed and intubated. He transferred out of the ICU . Covid 19 positive   Fevers, recurrent   Severe Sepsis of unknown origin  Hypotension   Patient spiked fever overnight,  highest 102.8, was tachycardic with heart rate between 101-120. Sepsis protocol initiated  Lactic acid 2.5, repeat lactic acid until his resolution. Lactic acid resolved after IV fluid  ivf bolus 250 cc, and albumin  Continue with IV antibiotics cefepime and vancomycin  chest x-ray negative for pneumonia  Blood cultures negative to date  Unclear source of the sepsis but most likely his fever episodes are secondary to COVID-19  Procalcitonin is trending down. Continue empiric vancomycin and Zosyn.           Hemorrhagic shock secondary to acute blood loss anemia, complicated by anemia of CKD and thrombocytopenia, secondary to upper GI bleed, bleeding gastric varices  Acute on chronic anemia   -Status post 13 units of PRBCs, 3 units of plasma, 2 units of cryoprecipitate, 3 units of platelets, pressors, octreotide drip, PPI  -Status post 4 EGDs, most recent EGD here December 26, which showed 2 healing esophageal ulcers without stigmata, large gastric varices in the stomach, with 1 gastric varices oozing blood status post epinephrine injection, 2 hemoclips. -Was felt to not be a candidate for TIPS due to severe right heart failure  -He was reintubated December 28 for IR evaluation for BRTO December 28, but he was found to have a large gastric renal shunt which would deem the procedure to the unlikely of any benefit, if even possible, making TIPS the only viable option  -TIPS procedure attempted 12/30 but his portal pressures were not consistent with portal hypertension, therefore he was not a candidate  -Liver was found to be normal on ultrasound and CT was negative for signs of cirrhosis  -? Passive congestion from right-sided heart failure causing acute liver decompensation but has improved, but patient is too high risk to do a liver biopsy currently  -Appreciate recommendations from GI and IR  - Per GI: Serial H&H, advance diet, if he rebleeds, could possibly need a repeat EGD to rule out Dieulafoy lesion of stomach  -Continue pantoprazole, Carafate  -01/08: hb stable around 8 , no bleeding  01/09: Hemoglobin 7.4  01/10: Hemoglobin 6.7, a unit of PRBC given this morning  01/11: Hemoglobin 7.6  01/13: Hemoglobin 6.8, given a unit of PRBC  01/14: Hemoglobin 8.7 today with no evidence of active bleeding.   Continue to follow CBC closely  1/15: Hemoglobin remained stable at 8.7    Acute hypoxic respiratory failure status post extubation  -Intubated December 28, extubated December 29  Currently continue to be on room air for more than 48 hours      ESRD on hemodialysis, status post renal transplant x2  -Nephrology on board, appreciate the recommendations.  -Continue Retacrit    Heart failure reduced EF, not in exacerbation  -Last EF 35% (November 2021)  -Not currently on a beta-blocker or an ACE inhibitor for unknown reason  - Most recent cardiology note on December 2 recommended metoprolol succinate 12.5 mg, will start that    History of HIT  -Avoid heparin  -SCDs contraindicated due to PAD     Extensive right lower extremity DVT status post thrombectomy and IVC filter placement  S/p s/p venous thrombectomy and IVC filter placement 1/4  -Vascular on board- appreciate the recommendations   -Duplex dated 12-30-21: Graft 1: left fem-tibial graft  The graft inflow demonstrates <50% stenosis. The proximal anastomosis demonstrates <50% stenosis. The proximal graft demonstrates <50% stenosis. The mid graft demonstrates <50% stenosis. The distal graft demonstrates <50% stenosis. The distal anastomosis demonstrates <50% stenosis. The graft outflow demonstrates severe (>75%) stenosis. The mid, distal and outflow graft flow is monophasic. The inflow and proximal graft flow is triphasic. -PETER 12-30-21: PVR waveforms in the right ankle consistent with moderate disease. PVR waveforms at the right metatarsal region consistent with moderate disease. PVR waveforms in the left ankle consistent with moderate disease. PVR waveforms of the left metatarsal region consistent with severe disease. Unable to obtain left brachial pressure due to AV Fistula. Ankle/metatarsal pressures only, patient has LLE bypass graft. Right DP is non compressible, RLE appears moderate. Left PT is non compressible, LLE appears severe with absent digit waveform and absent pulses. LLE bypass graft appears patent. Left PTA proximal appears occluded.  Left JOEY proximal appears monophasic with a velocity of 11 cm/s.  01/12: Reviewed vascular surgery input, no indication for further thrombectomy, patient will continue the blood clots ,has IVC filter placed  01/11  Repeat right lower extremity Doppler showed persistent clots, some clot occlusive, not. Reconsulted vascular surgery for input to see if need for repeat thrombectomy  01/10: Patient continues to complain of pain on his left lower extremity associated with swelling which he Thinks is worse than when he came in  Repeat Doppler ultrasound lower extremity on January 10 with no new DVT    Deconditioning  -PT/OT    Chronic pain, chronically on opioids  Patient reported being in a lot of pain despite being on oxycodone  Want IV pain medication, explained to him that oral pain medication provide better pain control. c/w p.o. Dilaudid every 8 hours only given if ox Roxicodone does not provide pain relief. We will avoid IV narcotics    Wounds to left second and third great toes  -Continue wound care: wash with soap and water, dry completely. paint with betadine/povidine swabs  Weave a 4x4 between toes. Left arm swelling in the setting of known central venous stenosis  -Left arm swelling- s/p angioplasty of left subclavian vein  -    18.5 - 24.9 Normal weight / Body mass index is 23.82 kg/m². Estimated discharge date: January 17 20  Barriers: Needs inpatient rehab placement    Code status: Full  Prophylaxis: H2B/PPI  Recommended Disposition: Home w/Family     Subjective:   He reports feeling better. Shortness of breath is improving. No cough or phlegm  No fever  Hemoglobin is stable at 8.7  No diarrhea        Objective:     VITALS:   Last 24hrs VS reviewed since prior progress note.  Most recent are:  Patient Vitals for the past 24 hrs:   Temp Pulse Resp BP SpO2   01/15/22 1722 98 °F (36.7 °C) 87 16 (!) 145/83 97 %   01/15/22 0821 98.5 °F (36.9 °C) 92 18 (!) 140/81 93 %       Intake/Output Summary (Last 24 hours) at 1/15/2022 2045  Last data filed at 1/15/2022 1207  Gross per 24 hour   Intake 360 ml   Output --   Net 360 ml        I had a face to face encounter and independently examined this patient on 1/15/2022, as outlined below:  PHYSICAL EXAM:  General: Alert, cooperative, no acute distress    EENT:  Anicteric sclerae. MMM  Resp:  Bilateral air entry present, crackles are present at bases, no wheezing  CV:  Regular  rhythm, no murmurs, rubs or gallops  GI:  Soft, Non distended,  \  +Bowel sounds  Neurologic:  Alert and oriented X 3, normal speech,   Psych:   Good insight. Not anxious nor agitated  Skin:  No rashes. No jaundice. Dressing to LUE fistula CDI  MSK:  Moves all extremities      Reviewed most current lab test results and cultures  YES  Reviewed most current radiology test results   YES  Review and summation of old records today    NO  Reviewed patient's current orders and MAR    YES  PMH/SH reviewed - no change compared to H&P  ________________________________________________________________________  Care Plan discussed with:    Comments   Patient Y    Family      RN Y    Care Manager     Consultant                        Multidiciplinary team rounds were held today with , nursing, pharmacist and clinical coordinator. Patient's plan of care was discussed; medications were reviewed and discharge planning was addressed. ________________________________________________________________________      Comments   >50% of visit spent in counseling and coordination of care Y    ________________________________________________________________________  Vivian Locke MD     Procedures: see electronic medical records for all procedures/Xrays and details which were not copied into this note but were reviewed prior to creation of Plan. LABS:  I reviewed today's most current labs and imaging studies.   Pertinent labs include:  Recent Labs     01/14/22  0001 01/13/22  0259   WBC 10.3 9.5   HGB 8.7* 6.8*   HCT 26.1* 21.1*    141*     Recent Labs     01/14/22  0001 01/13/22 0259    137   K 3.8 3.7   CL 95* 96*   CO2 33* 35*   GLU 86 71   BUN 27* 21*   CREA 4.97* 3.95*   CA 8.1* 8.4*   PHOS 3.5  --    ALB 1.4*  -- Signed: Yaritza Cardenas MD

## 2022-01-16 NOTE — PROGRESS NOTES
End of Shift Note    Bedside shift change report given to Liz (oncoming nurse) by Hui Hoyos (offgoing nurse). Report included the following information SBAR, Kardex, Procedure Summary, Intake/Output, MAR and Recent Results    Shift worked:  7-7pm     Shift summary and any significant changes:     No significant changes.         Concerns for physician to address:      Zone phone for oncoming shift:            Hui Hoyos

## 2022-01-16 NOTE — PROGRESS NOTES
Received message from patient's nurse stating:    Pt requesting \"one time dose of dose of iv pain medication. \" He states that his pain is worse today in his RLE where thrombectomy was performed. there is no increase in redness or swelling in the area, no increased warmth, no new numbness/tingling in the limb, no drainage on dressing. Pt rates pain at 8.5/10 describes it as \"soreness, aching. \"         Discussion / orders:    · Morphine 2 mg IV x 1           Please note that this note was dictated using Dragon computer voice recognition software. Quite often unanticipated grammatical, syntax, homophones, and other interpretive errors are inadvertently transcribed by the computer software. Please disregard these errors. Please excuse any errors that have escaped final proofreading.      Signed by:  Bridger Blair DNP, ACNP-BC

## 2022-01-16 NOTE — PROGRESS NOTES
End of Shift Note    Bedside shift change report given to Liz (oncoming nurse) by Love Chauhan (offgoing nurse). Report included the following information SBAR, Kardex, Procedure Summary, Intake/Output, MAR and Recent Results    Shift worked:  7-7pm     Shift summary and any significant changes:     No significant changes.       Concerns for physician to address:      Zone phone for oncoming shift:            Love Chauhan

## 2022-01-16 NOTE — PROGRESS NOTES
Transition of Care Plan:     RUR: 30%   Disposition: [facility] or [facility] (waiting on bed availability)  Follow up appointments: Follow up with PCP and/or Specialist   DME needed: N/A  Transportation at Discharge: Family to assist   [place] or means to access home: Family will provide        IM Medicare Letter: 2nd IM Medicare Letter to be given   Is patient a BCPI-A Bundle:   N/A                  If yes, was Bundle Letter given?:    Is patient a [place] and connected with the [facility]? N/A  If yes, was [employer] transfer form completed and [facility] notified? Caregiver Contact: Silverio Peres (sister) [phone] or James Curtis (family member) [phone]  Discharge Caregiver contacted prior to discharge?  Family to be contacted     2:33PM UPDATE  MD notified CM of d/c for Monday. Pt's discharge to [facility] or [facility] pending bed availability at this time. Primary CM to follow-up tomorrow as no neither facility has beds available this weekend.      Lalita Howard. [place] [address] Manager  [phone]

## 2022-01-17 VITALS
HEIGHT: 67 IN | WEIGHT: 152.12 LBS | HEART RATE: 92 BPM | BODY MASS INDEX: 23.88 KG/M2 | OXYGEN SATURATION: 96 % | TEMPERATURE: 98.1 F | SYSTOLIC BLOOD PRESSURE: 134 MMHG | RESPIRATION RATE: 20 BRPM | DIASTOLIC BLOOD PRESSURE: 79 MMHG

## 2022-01-17 LAB
ALBUMIN SERPL-MCNC: 1.4 G/DL (ref 3.5–5)
ANION GAP SERPL CALC-SCNC: 8 MMOL/L (ref 5–15)
BUN SERPL-MCNC: 24 MG/DL (ref 6–20)
BUN/CREAT SERPL: 4 (ref 12–20)
CALCIUM SERPL-MCNC: 8.5 MG/DL (ref 8.5–10.1)
CHLORIDE SERPL-SCNC: 97 MMOL/L (ref 97–108)
CO2 SERPL-SCNC: 33 MMOL/L (ref 21–32)
COMMENT, HOLDF: NORMAL
CREAT SERPL-MCNC: 6.13 MG/DL (ref 0.7–1.3)
ERYTHROCYTE [DISTWIDTH] IN BLOOD BY AUTOMATED COUNT: 17.1 % (ref 11.5–14.5)
GLUCOSE SERPL-MCNC: 72 MG/DL (ref 65–100)
HCT VFR BLD AUTO: 28.8 % (ref 36.6–50.3)
HGB BLD-MCNC: 9.3 G/DL (ref 12.1–17)
MCH RBC QN AUTO: 28.8 PG (ref 26–34)
MCHC RBC AUTO-ENTMCNC: 32.3 G/DL (ref 30–36.5)
MCV RBC AUTO: 89.2 FL (ref 80–99)
NRBC # BLD: 0 K/UL (ref 0–0.01)
NRBC BLD-RTO: 0 PER 100 WBC
PHOSPHATE SERPL-MCNC: 4.2 MG/DL (ref 2.6–4.7)
PLATELET # BLD AUTO: 220 K/UL (ref 150–400)
PMV BLD AUTO: 12 FL (ref 8.9–12.9)
POTASSIUM SERPL-SCNC: 3.8 MMOL/L (ref 3.5–5.1)
RBC # BLD AUTO: 3.23 M/UL (ref 4.1–5.7)
SAMPLES BEING HELD,HOLD: NORMAL
SODIUM SERPL-SCNC: 138 MMOL/L (ref 136–145)
WBC # BLD AUTO: 10.2 K/UL (ref 4.1–11.1)

## 2022-01-17 PROCEDURE — 74011250637 HC RX REV CODE- 250/637: Performed by: NURSE PRACTITIONER

## 2022-01-17 PROCEDURE — 74011000250 HC RX REV CODE- 250: Performed by: SURGERY

## 2022-01-17 PROCEDURE — 06CC3ZZ EXTIRPATION OF MATTER FROM RIGHT COMMON ILIAC VEIN, PERCUTANEOUS APPROACH: ICD-10-PCS | Performed by: SURGERY

## 2022-01-17 PROCEDURE — 06H03DZ INSERTION OF INTRALUMINAL DEVICE INTO INFERIOR VENA CAVA, PERCUTANEOUS APPROACH: ICD-10-PCS | Performed by: SURGERY

## 2022-01-17 PROCEDURE — 80069 RENAL FUNCTION PANEL: CPT

## 2022-01-17 PROCEDURE — 06CM3ZZ EXTIRPATION OF MATTER FROM RIGHT FEMORAL VEIN, PERCUTANEOUS APPROACH: ICD-10-PCS | Performed by: SURGERY

## 2022-01-17 PROCEDURE — 74011250637 HC RX REV CODE- 250/637: Performed by: INTERNAL MEDICINE

## 2022-01-17 PROCEDURE — 74011250637 HC RX REV CODE- 250/637: Performed by: SURGERY

## 2022-01-17 PROCEDURE — 74011250636 HC RX REV CODE- 250/636: Performed by: INTERNAL MEDICINE

## 2022-01-17 PROCEDURE — 85027 COMPLETE CBC AUTOMATED: CPT

## 2022-01-17 PROCEDURE — 06CY3ZZ EXTIRPATION OF MATTER FROM LOWER VEIN, PERCUTANEOUS APPROACH: ICD-10-PCS | Performed by: SURGERY

## 2022-01-17 PROCEDURE — B5191ZZ FLUOROSCOPY OF INFERIOR VENA CAVA USING LOW OSMOLAR CONTRAST: ICD-10-PCS | Performed by: SURGERY

## 2022-01-17 PROCEDURE — 90935 HEMODIALYSIS ONE EVALUATION: CPT

## 2022-01-17 PROCEDURE — B51B1ZZ FLUOROSCOPY OF RIGHT LOWER EXTREMITY VEINS USING LOW OSMOLAR CONTRAST: ICD-10-PCS | Performed by: SURGERY

## 2022-01-17 RX ADMIN — PANTOPRAZOLE SODIUM 40 MG: 40 TABLET, DELAYED RELEASE ORAL at 12:09

## 2022-01-17 RX ADMIN — SUCRALFATE 1 G: 1 TABLET ORAL at 12:10

## 2022-01-17 RX ADMIN — OXYCODONE 10 MG: 5 TABLET ORAL at 03:07

## 2022-01-17 RX ADMIN — Medication: at 12:14

## 2022-01-17 RX ADMIN — PANTOPRAZOLE SODIUM 40 MG: 40 TABLET, DELAYED RELEASE ORAL at 17:20

## 2022-01-17 RX ADMIN — SUCRALFATE 1 G: 1 TABLET ORAL at 17:20

## 2022-01-17 RX ADMIN — HYDROMORPHONE HYDROCHLORIDE 2 MG: 2 TABLET ORAL at 14:23

## 2022-01-17 RX ADMIN — SODIUM CHLORIDE, PRESERVATIVE FREE 10 ML: 5 INJECTION INTRAVENOUS at 14:27

## 2022-01-17 RX ADMIN — LEVOFLOXACIN 750 MG: 5 INJECTION, SOLUTION INTRAVENOUS at 00:27

## 2022-01-17 RX ADMIN — SODIUM CHLORIDE, PRESERVATIVE FREE 10 ML: 5 INJECTION INTRAVENOUS at 06:24

## 2022-01-17 RX ADMIN — ACETAMINOPHEN 650 MG: 325 TABLET ORAL at 03:07

## 2022-01-17 RX ADMIN — METOPROLOL TARTRATE 12.5 MG: 25 TABLET, FILM COATED ORAL at 12:10

## 2022-01-17 NOTE — DISCHARGE SUMMARY
Hospitalist Discharge Summary     Patient ID:  Augustina Enriquez  [id number]  77 y.o.  1975 12/26/2021    PCP on record: Robbin rOtiz MD    Admit date: 12/26/2021  Discharge date and time: 1/7/22    DISCHARGE DIAGNOSIS:  Acute GI bleed with massive hematemesis appeared recurrent and severe. Acute hemorrhagic shock  Status post intensive care unit pressors. Status post about 13 units of PRBC 3 units of plasma 2 units of cryoprecipitate. Status post pressors octreotide. Status post for EGD. Most recent December 26. Right iliofemoral extensive DVT  Status post thrombectomy and IVC filter placement. End-stage renal disease on hemodialysis. Severe peripheral vascular disease. CONSULTATIONS:  IP CONSULT TO GASTROENTEROLOGY  IP CONSULT TO INTENSIVIST  IP CONSULT TO NEPHROLOGY  IP CONSULT TO INTERVENTIONAL RADIOLOGY  IP CONSULT TO VASCULAR SURGERY  IP CONSULT TO VASCULAR SURGERY    Excerpted HPI from H&P of Kiya Pierre MD:  Augustina Enriquez is a 42 y.o. male who has a PMH of ESRD on HD via fistula, recent COVID-19 infection, recent UGIB secondary to gastric varices and esophageal ulcerations c/b hemorrhagic shock, recent S intermedius bacteremia, recurrent DVT, IVC filter, HFrEF 35% s/p AICD, CAD, and severe pulmonary HTN who was recently discharged from [facility] on 12/24 following a prolonged hospital course for GIB secondary to GVs and esophageal ulcerations and also COVID-19 pneumonia. He had been taken off isolation prior to discharge    ______________________________________________________________________  DISCHARGE SUMMARY/HOSPITAL COURSE:  for full details see H&P, daily progress notes, labs, consult notes.    Patient is a 09-[id number]-NI extensive complicated stay in the hospital patient with multiple transfusions hemorrhagic shock extensive right lower extremity DVT status post thrombectomy and IVC filter placement patient with end-stage renal disease as well patient appeared might of had variceal bleed secondary to gastric varices. Patient was in the intensive care unit on pressors on octreotide. Subsequently was transferred out to medical floor we will continue to care continue PPI. Patient a day earlier had thrombectomy and IVC filter placement. H&H have been stable. We will continue with current regimen. Patient change his mind he want to go to SN[facility] a [facility] Case management working on getting patient to SNF. Likely today. Patient was hallucinating yesterday but EMR repeatable factor patient is scheduled for today to go to SNF. EMTALA completed. _______________________________________________________________________  Patient seen and examined by me on discharge day. Pertinent Findings:  Gen:    Not in distress  Chest: Clear lungs  CVS:   Regular rhythm. No edema  Abd:  Soft, not distended, not tender  Neuro:  Alert,   _______________________________________________________________________  DISCHARGE MEDICATIONS:   Current Discharge Medication List      START taking these medications    Details   sucralfate (CARAFATE) 1 gram tablet Take 1 Tablet by mouth Before breakfast, lunch, dinner and at bedtime. Qty: 120 Tablet, Refills: 2  Start date: 1/5/2022      polyethylene glycol (MIRALAX) 17 gram packet Take 1 Packet by mouth daily. Qty: 30 Packet, Refills: 0  Start date: 1/5/2022      oxyCODONE IR (ROXICODONE) 10 mg tab immediate release tablet Take 1 Tablet by mouth every four (4) hours as needed for Pain for up to 3 days. Max Daily Amount: 60 mg.  Qty: 18 Tablet, Refills: 0  Start date: 1/5/2022, End date: 1/8/2022    Associated Diagnoses: Arterial occlusion         CONTINUE these medications which have CHANGED    Details   pantoprazole (PROTONIX) 40 mg tablet Take 1 Tablet by mouth Before breakfast and dinner.   Qty: 60 Tablet, Refills: 2  Start date: 1/5/2022         CONTINUE these medications which have NOT CHANGED Details   propranoloL (INDERAL) 10 mg tablet Take 10 mg by mouth two (2) times a day. Vitamin D2 1,250 mcg (50,000 unit) capsule TAKE 1 CAPSULE BY MOUTH THREE  DAYS (TIMES) A WEEK      metoclopramide HCl (REGLAN) 5 mg tablet Take 1 Tablet by mouth two (2) times a day for 30 days. Qty: 60 Tablet, Refills: 0      ondansetron (Zofran ODT) 4 mg disintegrating tablet 1 Tab by SubLINGual route every eight (8) hours as needed for Nausea or Vomiting. Qty: 20 Tab, Refills: 0      calcium acetate,phosphat bind, (PHOSLO) 667 mg cap Take 2 Caps by mouth three (3) times daily (with meals). Indications: low amount of calcium in the blood, renal osteodystrophy with hyperphosphatemia  Qty: 120 Cap, Refills: 0      atorvastatin (Lipitor) 20 mg tablet Take 20 mg by mouth daily. acetaminophen (TYLENOL) 500 mg tablet Take 1 Tab by mouth every four (4) hours as needed for Pain. Over the counter  Qty: 30 Tab, Refills: 0      calcitRIOL (ROCALTROL) 0.5 mcg capsule Take 0.5 mcg by mouth daily. STOP taking these medications       oxyCODONE-acetaminophen (PERCOCET) 5-325 mg per tablet Comments:   Reason for Stopping:                 Patient Follow Up Instructions: Activity: Activity as tolerated  Diet: Cardiac Diet  Wound Care    Follow-up with  Pcp, nephrology, vascular   Follow-up tests/labs    Follow-up Information     Follow up With Specialties Details Why Contact Info    Negro Redding MD Vascular Surgery On 1/17/2022 @ 6:45 am. For your left leg arteriogram.  Nothing to eat or drink after midnight on the night prior to the procedure. DO NOT TAKE your warfarin for 5 days prior to the procedure if restarted.   [facility]  [phone]      Trina Townsend MD Gastroenterology Schedule an appointment as soon as possible for a visit  [address]  [address]  [address] [facility]      Kevon Vásquez MD Family Medicine Go on 1/21/2022 at 12:25pm for your PCP hospital follow up. [place]  [phone]      ALL ABOUT CARE   THIS  [address]   IF YOU DO NOT HEAR FROM THEM WITHIN 24-48HRS, PLEASE CONTACT THEM DIRECTLY [phone]        ________________________________________________________________    Risk of deterioration: high     Condition at Discharge:  Stable  __________________________________________________________________    Disposition  home    ____________________________________________________________________    Code Status: full  ___________________________________________________________________      Total time in minutes spent coordinating this discharge (includes going over instructions, follow-up, prescriptions, and preparing report for sign off to her PCP) : 38 minutes    Signed:  Jose Rafael Espinoza MD

## 2022-01-17 NOTE — PROGRESS NOTES
Nephrology Progress Note  Blaise Leblanc     [facility]. [facility]  Phone - [phone]   Patient: Stef Carty    YOB: 1975        Date- 1/17/2022   Admit Date: 12/26/2021  CC: Follow up for ESRD       IMPRESSION & PLAN:   · ESRD - home HD 5 days per week- Follows up with Dr William Diop at [facility]  · Covid 19 infection  · Right leg ileofemoral DVT -s/p venous thrombectomy and IVC filter placement 1/4  · H/o esophageal bleed from esophagitis  · Left arm swelling- s/p angioplasty of left subclavian vein  · Gram positive sepsis from dental abcess  · s/p lead extraction at [facility]  · Anemia of ckd  · Hx of renal tx in past times 2.  · Hypertension  · CAD  · Failed RTX times 2  · H/o DVT, s/p ivc filter/ h/o HIT      PLAN-   HD today   Doing well    Subjective: Interval History:   bp okay. No new issues     Objective:   Vitals:    01/17/22 1040 01/17/22 1055 01/17/22 1110 01/17/22 1117   BP: 130/80 130/70 133/76 123/71   Pulse: 75 75 74 73   Resp:       Temp:       TempSrc:       SpO2:       Weight:       Height:          01/16 0701 - 01/17 0700  In: 1120 [P.O.:1120]  Out: -   Last 3 Recorded Weights in this Encounter    01/09/22 0605 01/12/22 0629 01/13/22 0546   Weight: 63 kg (139 lb) 68.2 kg (150 lb 6.4 oz) 69 kg (152 lb 1.9 oz)      Physical exam:     GEN: NAD  NECK- Supple  RESP: NO DISTRESS  NEURO:non focal      Due to the COVID-19 pandemic, in order to reduce the spread and transmission of the virus, some basic elements of the physical exam have been deferred to reduce direct or close contact with the patient . Chart reviewed. Pertinent Notes reviewed.      Data Review :  Recent Labs     01/17/22  0032 01/16/22  0302    136   K 3.8 3.6   CL 97 97   CO2 33* 33*   BUN 24* 19   CREA 6.13* 5.19*   GLU 72 78   CA 8.5 8.1*   PHOS 4.2  --      Recent Labs     01/17/22  0032 01/16/22  0302   WBC 10.2 10.0   HGB 9.3* 9.4*   HCT 28.8* 29.0*    187     No results for input(s): FE, TIBC, PSAT, FERR in the last 72 hours.    Medication list  reviewed  Current Facility-Administered Medications   Medication Dose Route Frequency    levoFLOXacin (LEVAQUIN) 750 mg in D5W IVPB  750 mg IntraVENous Q48H    [START ON 1/18/2022] levoFLOXacin (LEVAQUIN) 500 mg in D5W IVPB  500 mg IntraVENous Q48H    metoprolol tartrate (LOPRESSOR) tablet 12.5 mg  12.5 mg Oral Q12H    acetaminophen (TYLENOL) tablet 650 mg  650 mg Oral Q4H PRN    Or    acetaminophen (TYLENOL) suppository 650 mg  650 mg Rectal Q4H PRN    HYDROmorphone (DILAUDID) tablet 2 mg  2 mg Oral Q8H PRN    oxyCODONE IR (ROXICODONE) tablet 10 mg  10 mg Oral Q4H PRN    sucralfate (CARAFATE) tablet 1 g  1 g Oral AC&HS    pantoprazole (PROTONIX) tablet 40 mg  40 mg Oral ACB&D    alum-mag hydroxide-simeth (MYLANTA) oral suspension 30 mL  30 mL Oral Q4H PRN    balsam peru-castor oiL (VENELEX) ointment   Topical BID    sodium chloride (NS) flush 5-40 mL  5-40 mL IntraVENous Q8H    sodium chloride (NS) flush 5-40 mL  5-40 mL IntraVENous PRN    polyethylene glycol (MIRALAX) packet 17 g  17 g Oral DAILY PRN    ondansetron (ZOFRAN) injection 4 mg  4 mg IntraVENous Q6H PRN    alcohol 62% (NOZIN) nasal  1 Ampule  1 Ampule Topical Q12H    epoetin emilie-epbx (RETACRIT) injection 20,000 Units  20,000 Units SubCUTAneous Q MON, WED & FRI          MD [facility]  [facility] [address]  [place], [address]  Phone - [phone]               Fax - [phone]

## 2022-01-17 NOTE — PROGRESS NOTES
11:45am: CM contacted [facility] to follow-up on bed availability CM was informed that they currently do not have any beds today. 9:00am: LINDA contacted Pro Duvall with [address] [place] [address] ([phone]) and Ari Koroma with [facility] ([phone]) to follow-up on bed availability. LINDA was informed by both facilities that they would follow-up on bed status after their morning meeting.            Anderson Ya, [facility], LINDA Daniels

## 2022-01-17 NOTE — PROGRESS NOTES
Hospitalist Progress Note    NAME: Stef Carty   :  1975   MRN:  [id number]       Assessment / Plan: This is a 51-year-old male with a history of dilated, end-stage renal disease, on hemodialysis , Streptococcus intermedius bacteremia in 2021, status post IV thank stopped 2021, gastric varices with upper GI bleed, thrombocytopenia, transaminitis, previous Covid pneumonia, chronically anticoagulated with warfarin for recurrent DVT, acute on chronic systolic heart failure with an EF of 35%, history of V. tach status post AICD, CAD, severe pulmonary hypertension who presented to the emergency room  with complaints of epigastric abdominal pain and vomiting throughout the day. He was discharged  for the same symptoms having received an EGD with banding of esophageal varices as well as COVID-19 pneumonia. Patient had an unwitnessed fall in the emergency room. He received CT of the head and C-spine both of which were negative for any acute process. He was admitted to the ICU for management of an upper GI bleed and intubated. He transferred out of the ICU . Covid 19 positive   Fevers, recurrent   Severe Sepsis of unknown origin  Hypotension  Possible Superimposed Bacterial Pneumonia   Patient spiked fever overnight,  highest 102.8, was tachycardic with heart rate between 101-120. Sepsis protocol initiated  Lactic acid 2.5, repeat lactic acid until his resolution. Lactic acid resolved after IV fluid  ivf bolus 250 cc, and albumin  Continue with IV antibiotics cefepime and vancomycin  chest x-ray negative for pneumonia  Blood cultures negative to date  Unclear source of the sepsis but most likely his fever episodes are secondary to COVID-19  Procalcitonin is trending down.  Change abx to Levaquin as he is improving          Hemorrhagic shock secondary to acute blood loss anemia, complicated by anemia of CKD and thrombocytopenia, secondary to upper GI bleed, bleeding gastric varices  Acute on chronic anemia   -Status post 13 units of PRBCs, 3 units of plasma, 2 units of cryoprecipitate, 3 units of platelets, pressors, octreotide drip, PPI  -Status post 4 EGDs, most recent EGD here December 26, which showed 2 healing esophageal ulcers without stigmata, large gastric varices in the stomach, with 1 gastric varices oozing blood status post epinephrine injection, 2 hemoclips. -Was felt to not be a candidate for TIPS due to severe right heart failure  -He was reintubated December 28 for IR evaluation for BRTO December 28, but he was found to have a large gastric renal shunt which would deem the procedure to the unlikely of any benefit, if even possible, making TIPS the only viable option  -TIPS procedure attempted 12/30 but his portal pressures were not consistent with portal hypertension, therefore he was not a candidate  -Liver was found to be normal on ultrasound and CT was negative for signs of cirrhosis  -? Passive congestion from right-sided heart failure causing acute liver decompensation but has improved, but patient is too high risk to do a liver biopsy currently  -Appreciate recommendations from GI and IR  - Per GI: Serial H&H, advance diet, if he rebleeds, could possibly need a repeat EGD to rule out Dieulafoy lesion of stomach  -Continue pantoprazole, Carafate  -01/08: hb stable around 8 , no bleeding  01/09: Hemoglobin 7.4  01/10: Hemoglobin 6.7, a unit of PRBC given this morning  01/11: Hemoglobin 7.6  01/13: Hemoglobin 6.8, given a unit of PRBC  01/14: Hemoglobin 8.7 today with no evidence of active bleeding.   Continue to follow CBC closely  Hb remains stable between 8-9 range    Acute hypoxic respiratory failure status post extubation  -Intubated December 28, extubated December 29  Currently continue to be on room air       ESRD on hemodialysis, status post renal transplant x2  -renal following for HD needs -Continue Retacrit    Heart failure reduced EF, not in exacerbation  -Last EF 35% (November 2021)  -Not currently on a beta-blocker or an ACE inhibitor for unknown reason  - Most recent cardiology note on December 2 recommended metoprolol succinate 12.5 mg, will start that    History of HIT  -Avoid heparin  -SCDs contraindicated due to PAD     Extensive right lower extremity DVT status post thrombectomy and IVC filter placement  S/p s/p venous thrombectomy and IVC filter placement 1/4  -Vascular on board- appreciate the recommendations   -Duplex dated 12-30-21: Graft 1: left fem-tibial graft  The graft inflow demonstrates <50% stenosis. The proximal anastomosis demonstrates <50% stenosis. The proximal graft demonstrates <50% stenosis. The mid graft demonstrates <50% stenosis. The distal graft demonstrates <50% stenosis. The distal anastomosis demonstrates <50% stenosis. The graft outflow demonstrates severe (>75%) stenosis. The mid, distal and outflow graft flow is monophasic. The inflow and proximal graft flow is triphasic. -PETER 12-30-21: PVR waveforms in the right ankle consistent with moderate disease. PVR waveforms at the right metatarsal region consistent with moderate disease. PVR waveforms in the left ankle consistent with moderate disease. PVR waveforms of the left metatarsal region consistent with severe disease. Unable to obtain left brachial pressure due to AV Fistula. Ankle/metatarsal pressures only, patient has LLE bypass graft. Right DP is non compressible, RLE appears moderate. Left PT is non compressible, LLE appears severe with absent digit waveform and absent pulses. LLE bypass graft appears patent. Left PTA proximal appears occluded.  Left JOEY proximal appears monophasic with a velocity of 11 cm/s.  01/12: Reviewed vascular surgery input, no indication for further thrombectomy, patient will continue the blood clots ,has IVC filter placed  01/11  Repeat right lower extremity Doppler showed persistent clots, some clot occlusive, not. Reconsulted vascular surgery for input to see if need for repeat thrombectomy  01/10: Patient continues to complain of pain on his left lower extremity associated with swelling which he Thinks is worse than when he came in  Repeat Doppler ultrasound lower extremity on January 10 with no new DVT    Deconditioning  -PT/OT    Chronic pain, chronically on opioids  Patient reported being in a lot of pain despite being on oxycodone  Want IV pain medication, explained to him that oral pain medication provide better pain control. c/w p.o. Dilaudid every 8 hours only given if ox Roxicodone does not provide pain relief. We will avoid IV narcotics    Wounds to left second and third great toes  -Continue wound care: wash with soap and water, dry completely. paint with betadine/povidine swabs  Weave a 4x4 between toes. Left arm swelling in the setting of known central venous stenosis  -Left arm swelling- s/p angioplasty of left subclavian vein  -    18.5 - 24.9 Normal weight / Body mass index is 23.82 kg/m². Estimated discharge date: January 17  Barriers: Needs inpatient rehab placement, waiting on bed, clear for discharge    Code status: Full  Prophylaxis: H2B/PPI  Recommended Disposition: Home w/Family     Subjective:   He reports feeling better. No further bleeding. No abdominal pain. No N/V. Hb is stable  No fever  Waiting on IPR placement         Objective:     VITALS:   Last 24hrs VS reviewed since prior progress note.  Most recent are:  Patient Vitals for the past 24 hrs:   Temp Pulse Resp BP SpO2   01/16/22 1658 98.2 °F (36.8 °C) 82 18 (!) 124/90 91 %   01/16/22 0824 98.3 °F (36.8 °C) 88 18 116/74 91 %   01/15/22 2251 98.9 °F (37.2 °C) 91 18 116/70 91 %       Intake/Output Summary (Last 24 hours) at 1/16/2022 2207  Last data filed at 1/16/2022 1853  Gross per 24 hour   Intake 880 ml   Output --   Net 880 ml        I had a face to face encounter and independently examined this patient on 1/16/2022, as outlined below:  PHYSICAL EXAM:  General: Alert, cooperative, no acute distress    EENT:  Anicteric sclerae. MMM  Resp:  Bilateral air entry present, crackles are present at bases, no wheezing  CV:  Regular  rhythm, no murmurs, rubs or gallops  GI:  Soft, Non distended,  \  +Bowel sounds  Neurologic:  Alert and oriented X 3, normal speech,   Psych:   Good insight. Not anxious nor agitated  Skin:  No rashes. No jaundice. Dressing to LUE fistula CDI  MSK:  Moves all extremities      Reviewed most current lab test results and cultures  YES  Reviewed most current radiology test results   YES  Review and summation of old records today    NO  Reviewed patient's current orders and MAR    YES  PMH/SH reviewed - no change compared to H&P  ________________________________________________________________________  Care Plan discussed with:    Comments   Patient Y    Family      RN Y    Care Manager     Consultant                        Multidiciplinary team rounds were held today with , nursing, pharmacist and clinical coordinator. Patient's plan of care was discussed; medications were reviewed and discharge planning was addressed. ________________________________________________________________________      Comments   >50% of visit spent in counseling and coordination of care Y    ________________________________________________________________________  Vivian Locke MD     Procedures: see electronic medical records for all procedures/Xrays and details which were not copied into this note but were reviewed prior to creation of Plan. LABS:  I reviewed today's most current labs and imaging studies.   Pertinent labs include:  Recent Labs     01/16/22  0302 01/14/22  0001   WBC 10.0 10.3   HGB 9.4* 8.7*   HCT 29.0* 26.1*    156     Recent Labs     01/16/22  0302 01/14/22  0001    136   K 3.6 3.8   CL 97 95*   CO2 33* 33*   GLU 78 86   BUN 19 27*   CREA 5.19* 4.97* CA 8.1* 8.1*   PHOS  --  3.5   ALB 1.4* 1.4*   TBILI 0.7  --    ALT <6*  --        Signed: French Durham MD

## 2022-01-17 NOTE — PROCEDURES
Hemodialysis / [phone]    Vitals Pre Post Assessment Pre Post   BP BP: (!) 142/82 (01/17/22 0804) 125/71 LOC A&Ox4 A&Ox4   HR Pulse (Heart Rate): 84 (01/17/22 0804) 69 Lungs Clear juan f Clear juan f   Resp Resp Rate: 20 (01/17/22 0804) 18 Cardiac WNL WNL   Temp Temp: 98.2 °F (36.8 °C) (01/17/22 0804) 97.8 Skin Warm and dry Warm and dry   Weight n/a n/a Edema Facial and generalized +1 Facial and generalized +1   Tele status n/a n/a Pain Pain Intensity 1: 4 (01/17/22 0808) 5/10     Orders   Duration: Start: 0810 End: 1140 Total: 3.5hrs   Dialyzer: Dialyzer/Set Up Inspection: Rosio Quiroz (01/14/22 1106)   K Bath: Dialysate K (mEq/L): 2 (01/14/22 1106)   Ca Bath: Dialysate CA (mEq/L): 3.0 (01/14/22 1106)   Na: Dialysate NA (mEq/L): 140 (01/14/22 1106)   Bicarb: Dialysate HCO3 (mEq/L): 40 (01/14/22 1106)   Target Fluid Removal: Goal/Amount of Fluid to Remove (mL): 2000 mL (01/14/22 1106)     Access   Type & Location: SUZANNE-AVG: Cannulated with 15g needles x 2 without difficulty, no redness or with some swelling, no pain,     Comments:                                        Labs   HBsAg (Antigen) / date:   Neg     1/7/22                                       HBsAb (Antibody) / date: Imm       1/7/22   Source: EPIC   Obtained/Reviewed  Critical Results Called HGB   Date Value Ref Range Status   01/17/2022 9.3 (L) 12.1 - 17.0 g/dL Final     Potassium   Date Value Ref Range Status   01/17/2022 3.8 3.5 - 5.1 mmol/L Final     Calcium   Date Value Ref Range Status   01/17/2022 8.5 8.5 - 10.1 MG/DL Final     BUN   Date Value Ref Range Status   01/17/2022 24 (H) 6 - 20 MG/DL Final     Creatinine   Date Value Ref Range Status   01/17/2022 6.13 (H) 0.70 - 1.30 MG/DL Final        Meds Given   Name Dose Route   n/a                 Adequacy / Fluid    Total Liters Process: 79.2L   Net Fluid Removed: 2000ml      Comments   Time Out Done:   (Time) 0804   Admitting Diagnosis: COVID +   Consent obtained/signed: Informed Consent Verified: Yes (01/14/22 1106)   Machine / RO # Machine Number: [id number] (01/14/22 1106)   Primary Nurse Rpt Pre: Ziyad Schroeder   Primary Nurse Rpt Post: Ziyad Schroeder   Pt Education: Procedure, access, Nocturnal hemo dialysis    Care Plan: To continue HD tx   Pts outpatient clinic: Home hemo     Tx Summary   Comments:   Pt at bedside A&Ox4. Consent signed & on file. SBAR received from Primary RN. 6160: Pt cannulated with 82V needles per policy & without issue. Labs drawn per request/ order. VSS. Dialysis Tx initiated. 0830: Pt watching tv  900: VSS  930: No distress  1000: Resting with eyes shut  1030: VSS    1140: Tx ended. VSS. All possible blood returned to patient. Hemostasis achieved without issue. Bed locked and in the lowest position, call bell and belongings in reach. SBAR given to Primary, RN. Patient is stable at time of their/ my departure. All Dialysis related medications have been reviewed.

## 2022-01-17 NOTE — PROGRESS NOTES
Hospitalist Progress Note    NAME: Favio Lemus   :  1975   MRN:  [id number]     This is a 62-year-old male with a history of dilated, end-stage renal disease, on hemodialysis , Streptococcus intermedius bacteremia in 2021, status post IV, stopped 2021, gastric varices with upper GI bleed, thrombocytopenia, transaminitis, previous Covid pneumonia, chronically anticoagulated with warfarin for recurrent DVT, acute on chronic systolic heart failure with an EF of 35%, history of V. tach status post AICD, CAD, severe pulmonary hypertension who presented to the emergency room  with complaints of epigastric abdominal pain and vomiting throughout the day. He was discharged  for the same symptoms having received an EGD with banding of esophageal varices as well as COVID-19 pneumonia. Patient had an unwitnessed fall in the emergency room. He received CT of the head and C-spine both of which were negative for any acute process. He was admitted to the ICU for management of an upper GI bleed and intubated. He transferred out of the ICU . Assessment / Plan:  Covid 19 positive   Fevers, recurrent   Severe Sepsis of unknown origin  Hypotension  Possible Superimposed Bacterial Pneumonia   Patient spiked fever overnight,  highest 102.8, was tachycardic with heart rate between 101-120. Sepsis protocol initiated  Lactic acid 2.5, repeat lactic acid until his resolution. Lactic acid resolved after IV fluid  ivf bolus 250 cc, and albumin  Continue with IV antibiotics cefepime and vancomycin  chest x-ray negative for pneumonia  Blood cultures negative to date  Unclear source of the sepsis but most likely his fever episodes are secondary to COVID-19  Procalcitonin is trending down.  Change abx to Levaquin as he is improving    - Continue levaquin      Hemorrhagic shock secondary to acute blood loss anemia, complicated by anemia of CKD and thrombocytopenia, secondary to upper GI bleed, bleeding gastric varices  Acute on chronic anemia   -Status post 13 units of PRBCs, 3 units of plasma, 2 units of cryoprecipitate, 3 units of platelets, pressors, octreotide drip, PPI  -Status post 4 EGDs, most recent EGD here December 26, which showed 2 healing esophageal ulcers without stigmata, large gastric varices in the stomach, with 1 gastric varices oozing blood status post epinephrine injection, 2 hemoclips. -Was felt to not be a candidate for TIPS due to severe right heart failure  -He was reintubated December 28 for IR evaluation for BRTO December 28, but he was found to have a large gastric renal shunt which would deem the procedure to the unlikely of any benefit, if even possible, making TIPS the only viable option  -TIPS procedure attempted 12/30 but his portal pressures were not consistent with portal hypertension, therefore he was not a candidate  -Liver was found to be normal on ultrasound and CT was negative for signs of cirrhosis  -? Passive congestion from right-sided heart failure causing acute liver decompensation but has improved, but patient is too high risk to do a liver biopsy currently  -Appreciate recommendations from GI and IR  - Per GI: Serial H&H, advance diet, if he rebleeds, could possibly need a repeat EGD to rule out Dieulafoy lesion of stomach  -Continue pantoprazole, Carafate  - Hb is stable. Hb 9.3    Acute hypoxic respiratory failure status post extubation  -Intubated December 28, extubated December 29  Currently continue to be on room air     ESRD on hemodialysis, status post renal transplant x2  -On iHD  -Continue Retacrit    Heart failure reduced EF, not in exacerbation  -Last EF 35% (November 2021)  -Not currently on a beta-blocker or an ACE inhibitor for unknown reason  - Continue metoprolol.  No aspirin due to GI bleed    History of HIT  -Avoid heparin  -SCDs contraindicated due to PAD     Extensive right lower extremity DVT status post thrombectomy and IVC filter placement  S/p s/p venous thrombectomy and IVC filter placement 1/4  -Vascular on board- appreciate the recommendations   -Duplex dated 12-30-21: Graft 1: left fem-tibial graft  The graft inflow demonstrates <50% stenosis. The proximal anastomosis demonstrates <50% stenosis. The proximal graft demonstrates <50% stenosis. The mid graft demonstrates <50% stenosis. The distal graft demonstrates <50% stenosis. The distal anastomosis demonstrates <50% stenosis. The graft outflow demonstrates severe (>75%) stenosis. The mid, distal and outflow graft flow is monophasic. The inflow and proximal graft flow is triphasic. -PETER 12-30-21: PVR waveforms in the right ankle consistent with moderate disease. PVR waveforms at the right metatarsal region consistent with moderate disease. PVR waveforms in the left ankle consistent with moderate disease. PVR waveforms of the left metatarsal region consistent with severe disease. Unable to obtain left brachial pressure due to AV Fistula. Ankle/metatarsal pressures only, patient has LLE bypass graft. Right DP is non compressible, RLE appears moderate. Left PT is non compressible, LLE appears severe with absent digit waveform and absent pulses. LLE bypass graft appears patent. Left PTA proximal appears occluded. Left JOEY proximal appears monophasic with a velocity of 11 cm/s.  01/12: Reviewed vascular surgery input, no indication for further thrombectomy, patient will continue the blood clots ,has IVC filter placed  01/11  Repeat right lower extremity Doppler showed persistent clots, some clot occlusive, not.   Reconsulted vascular surgery for input to see if need for repeat thrombectomy  01/10: Patient continues to complain of pain on his left lower extremity associated with swelling which he Thinks is worse than when he came in  Repeat Doppler ultrasound lower extremity on January 10 with no new DVT    - S/p percutaneous catheter thrombectomy of right popliteal femoral and iliac veins. Angiogram of right lower extremity veins and inferior vena cava. Insertion of inferior vena cava filter on 1/4    Deconditioning  -PT/OT    Chronic pain, chronically on opioids   We will avoid IV narcotics  Continue oral dilaudid    Wounds to left second and third great toes  -Continue wound care: wash with soap and water, dry completely. paint with betadine/povidine swabs  Weave a 4x4 between toes. Left arm swelling in the setting of known central venous stenosis  -Left arm swelling- s/p angioplasty of left subclavian vein  -    18.5 - 24.9 Normal weight / Body mass index is 23.82 kg/m². Estimated discharge date: January 17  Barriers: Needs inpatient rehab placement, waiting on bed, clear for discharge    Code status: Full  Prophylaxis: H2B/PPI  Recommended Disposition: Home w/Family     Subjective:   He is s/p thrombectomy and IVC filter 1/4  No new complaints. Just had HD    Objective:     VITALS:   Last 24hrs VS reviewed since prior progress note.  Most recent are:  Patient Vitals for the past 24 hrs:   Temp Pulse Resp BP SpO2   01/17/22 1110 -- 74 -- 133/76 --   01/17/22 1055 -- 75 -- 130/70 --   01/17/22 1040 -- 75 -- 130/80 --   01/17/22 1025 -- 72 -- 127/79 --   01/17/22 1010 -- 77 -- 119/70 --   01/17/22 0955 -- 73 -- 130/82 --   01/17/22 0940 -- 82 -- (!) 147/88 --   01/17/22 0925 -- 75 -- 131/72 --   01/17/22 0910 -- 72 -- 117/74 --   01/17/22 0855 -- 72 -- 116/63 --   01/17/22 0840 -- 78 -- (!) 141/73 --   01/17/22 0825 -- 72 -- (!) 140/85 --   01/17/22 0810 -- 82 -- 131/73 --   01/17/22 0804 98.2 °F (36.8 °C) 84 20 (!) 142/82 --   01/17/22 0351 -- -- 17 -- --   01/16/22 2332 97.8 °F (36.6 °C) 84 19 121/68 93 %   01/16/22 2234 -- -- 16 -- --   01/16/22 1658 98.2 °F (36.8 °C) 82 18 (!) 124/90 91 %       Intake/Output Summary (Last 24 hours) at 1/17/2022 1134  Last data filed at 1/17/2022 0511  Gross per 24 hour   Intake 760 ml   Output -- Net 760 ml        I had a face to face encounter and independently examined this patient on 1/17/2022, as outlined below:  PHYSICAL EXAM:  General: Alert, cooperative, no acute distress    EENT:  Anicteric sclerae. MMM  Resp:  Bilateral air entry present, crackles are present at bases, no wheezing  CV:  Regular  rhythm, no murmurs, rubs or gallops  GI:  Soft, Non distended,  \  +Bowel sounds  Neurologic:  Alert and oriented X 3, normal speech,   Psych:   Good insight. Not anxious nor agitated  Skin:  No rashes. No jaundice. Dressing to LUE fistula CDI  MSK:  Moves all extremities      Reviewed most current lab test results and cultures  YES  Reviewed most current radiology test results   YES  Review and summation of old records today    NO  Reviewed patient's current orders and MAR    YES  PMH/ reviewed - no change compared to H&P  ________________________________________________________________________  Care Plan discussed with:    Comments   Patient Y    Family      RN Y    Care Manager     Consultant                        Multidiciplinary team rounds were held today with , nursing, pharmacist and clinical coordinator. Patient's plan of care was discussed; medications were reviewed and discharge planning was addressed. ________________________________________________________________________      Comments   >50% of visit spent in counseling and coordination of care Y    ________________________________________________________________________  Johanny Simmons MD     Procedures: see electronic medical records for all procedures/Xrays and details which were not copied into this note but were reviewed prior to creation of Plan. LABS:  I reviewed today's most current labs and imaging studies.   Pertinent labs include:  Recent Labs     01/17/22 0032 01/16/22  0302   WBC 10.2 10.0   HGB 9.3* 9.4*   HCT 28.8* 29.0*    187     Recent Labs     01/17/22 0032 01/16/22  0302    136 K 3.8 3.6   CL 97 97   CO2 33* 33*   GLU 72 78   BUN 24* 19   CREA 6.13* 5.19*   CA 8.5 8.1*   PHOS 4.2  --    ALB 1.4* 1.4*   TBILI  --  0.7   ALT  --  <6*       Signed: Elliot Jackman MD

## 2022-01-17 NOTE — INTERDISCIPLINARY ROUNDS
Interdisciplinary Rounds were completed on 01/17/22 for this patient. Rounds included nursing, clinical care leader, pharmacy, and case management. Plan of care discussed. See clinical pathway and/or care plan for interventions and desired outcomes.

## 2022-01-17 NOTE — PROGRESS NOTES
LINDA received a phone call from [address] with [facility]. CM was informed that they now have a bed available for Pt today. Call report: [phone]  Room: 12  Accepting physician: Dr. Vega Zuniga CM sent transport request to [facility] for 2pm. CM was informed that they are able to pick-up the Pt at 6:15pm today. LINDA notified Pamella with [employer].          Ree Bruce, [facility], LINDA Daniels

## 2022-01-17 NOTE — OP NOTES
[address]  OPERATIVE REPORT    Name:  Mickey Booth  MR#:  [id number]  :  1975  ACCOUNT #:  [de-identified]  DATE OF SERVICE:  2022    PREOPERATIVE DIAGNOSIS:  Right iliofemoral deep vein thrombosis. POSTOPERATIVE DIAGNOSIS:  Right iliofemoral deep vein thrombosis. PROCEDURES PERFORMED:  1. Percutaneous catheter thrombectomy of right popliteal, femoral and iliac veins. 2.  Angiogram of right lower extremity veins and inferior vena cava. 3.  Insertion of inferior vena cava filter. SURGEON:  Abimael Dumas MD    ASSISTANT:  None. ANESTHESIA:  General.    COMPLICATIONS:  None. SPECIMENS REMOVED:  None. IMPLANTS:  Cook Celect inferior vena cava filter. ESTIMATED BLOOD LOSS:  Less than 50 mL. DRAINS:  None. INDICATIONS:  The patient is a 81-year-old male with multiple medical problems including a past history of multiple DVT and placement of an inferior vena cava filter in the past.  He has a recent history of life-threatening GI bleeding requiring multiple transfusions and therefore can no longer be anticoagulated. He now has an acute right iliofemoral DVT with massive painful right lower extremity swelling. His inferior vena cava filter had been removed in the past due to previous episodes of blood stream infections. He now presents for an attempt at right lower extremity venous thrombectomy and insertion of an inferior vena cava filter. PROCEDURE:  After informed consent was obtained, the patient was given preoperative intravenous antibiotics within 1 hour of the start of the procedure. He was taken to the operating room and after induction of adequate general anesthesia, he was placed in the prone position and the right popliteal fossa was prepped and draped as a sterile field. Under ultrasound guidance, the right popliteal vein was cannulated without difficulty using a micropuncture technique and an 8-Palestinian sheath was placed.   A Glidewire and 5-Upper sorbian catheter were navigated up through the popliteal, femoral and iliac veins and an angiogram of the inferior vena cava was done which showed there was no thrombus in the inferior vena cava. Right lower extremity venograms were done injecting through both the sheath and the catheter at various levels and this showed that the proximal extent of the clot was the right common iliac vein and that there was occlusive clot throughout the popliteal, femoral and iliac veins. The popliteal sheath was exchanged for a 14-Upper sorbian ClotTriever sheath. Next, many passes of the ClotTriever device were used to completely clear the iliac femoral and popliteal veins of thrombus. After removing large amounts of acute thrombus. Completion angiograms revealed no residual thrombus in the popliteal, femoral or iliac veins. The popliteal venous sheath was removed and the puncture site was closed with a 3-0 figure-of-eight nylon suture. Next, the patient was returned to the supine position and the right groin was prepped and draped as a sterile field. Ultrasound was used to evaluate the right common femoral vein and this confirmed there was no residual thrombus within the femoral vein and it was widely patent. Under ultrasound guidance, the right common femoral vein was cannulated using a micropuncture technique and the introducer sheath for a Cook Celect inferior vena cava filter was inserted and advanced up to the L3 level. An angiogram of the inferior vena cava was done and the renal veins were clearly identified. The Cowart filter was then deployed landing at the level of the renal veins. The filter was intentionally deployed a little higher than normal to keep the body of the filter within the washout of the renal veins.   The patient has end-stage renal disease and is permanently dialysis dependent, but given his recurrent thrombosis and inability to be anticoagulated, I felt that deploying the filter in just at the level of the renal veins would reduce the chance of thrombosis of the filter and the lower inferior vena cava. Completion angiogram revealed good position of the filter and the right femoral venous sheath was removed and manual pressure was held with good hemostasis. The patient was extubated and transferred to the PACU in stable condition.       Rocky Estrada MD      [id number]  D:  01/17/2022 8:47  T:  01/17/2022 9:39  JOB #:  [id number]

## 2022-01-17 NOTE — PROGRESS NOTES
[facility]                                                                        42 y.o.   male    [address] [facility]   Room: 2140/01    [facility] MED TELE  Unit Phone# :  [phone]      [address]  [facility] 2 MED TELE  [address] [facility]  [place] [address]  Dept: [phone]  Loc: [phone]                    SITUATION     Admitted:  12/26/2021         Attending Provider:  Elizabeth Cooper MD       Consultations:  IP CONSULT TO GASTROENTEROLOGY  IP CONSULT TO INTENSIVIST  IP CONSULT TO NEPHROLOGY  IP CONSULT TO INTERVENTIONAL RADIOLOGY  IP CONSULT TO VASCULAR SURGERY  IP CONSULT TO VASCULAR SURGERY    PCP:  Yoav Walden MD   [phone]    Treatment Team: Attending Provider: Elizabeth Cooper MD; Consulting Provider: Patito Matute NP; Consulting Provider: Miguel Angel Monroy MD; Utilization Review: Ledy Yeboah RN; Care Manager: Roberto Hernández; Consulting Provider: Leo Felix MD; Consulting Provider: Kavin Stafford NP; Care Manager: Eduard Mo;  Care Manager: Marino Van; Consulting Provider: Zhanna Salinas MD; Care Manager: Salud Persaud    Admitting Dx:  GIB (gastrointestinal bleeding) [K92.2]       Principal Problem: <principal problem not specified>    13 Days Post-Op of   Procedure(s):  RIGHT LEG  VENOUS THROMBECTOMY AND INSERTION OF IVC FILTER   BY: Leo Felix MD             ON: 1/4/2022                  Code Status: Full Code                Advance Directives:   Advance Care Planning 1/4/2022   Patient's Healthcare Decision Maker is: -   Primary Decision Maker Name -   Primary Decision Maker Phone Number -   Primary Decision Maker Relationship to Patient -   Confirm Advance Directive None   Patient Would Like to Complete Advance Directive No    (Send w/patient)   No Doesnt Have       Isolation:  Droplet Plus       MDRO: COVID-19    Pain Medications given:  1423    Last dose:     Special Equipment needed: no  Type of equipment:    hemodialysis  (Not currently on dialysis)  (Not currently on dialysis)  (Not currently on dialysis)     BACKGROUND     Allergies: Allergies   Allergen Reactions    Shellfish Containing Products Swelling     Break out in rash, trouble breathing    Contrast Agent [Iodine] Shortness of Breath    Heparin Analogues Other (comments)     Positive history of HIT       Past Medical History:   Diagnosis Date    Abdominal hematoma     AICD (automatic cardioverter/defibrillator) present     CAD (coronary artery disease)     anterior MI s/p 2 stents  2007    Chronic abdominal pain     Chronic kidney disease     ESRD; Dialysis dependent.  Home [facility] does labs- [facility] tpke    DVT (deep venous thrombosis) ([address] 2001    DVT of popliteal vein ([address] 11/2011    not anticoagulated due to bleeding, IVC filter    Endocarditis     Gallstone pancreatitis     Gastrointestinal disorder     acid reflux    Gastrointestinal disorder     peptic ulcer    Hemodialysis patient ([address]     High cholesterol     Hypertension     Kidney transplant     b/l kidneys 1995, 2001    Long term current use of anticoagulant therapy     Nephrotic syndrome     Other ill-defined conditions(799.89)     kidny transplant x2,  on dialysis    Other ill-defined conditions(799.89)     home dialysis    Other ill-defined conditions(799.89)     hx recurrent left leg DVT    Peritonitis ([address]     Seizures ([address] 2015    most recent- only had three in lifetime    Small bowel obstruction (HCC)     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    [facility])        Past Surgical History:   Procedure Laterality Date    HX APPENDECTOMY      HX CHOLECYSTECTOMY      HX OTHER SURGICAL  11/2011    exploratory laparotomy    HX OTHER SURGICAL      fem-pop    HX OTHER SURGICAL  02/2013    right upper extremity fistula    HX PACEMAKER Left 6/2009    AICD-st latonya-not connected    HX PACEMAKER Left     AICD-left side-BS-working    HX SMALL BOWEL RESECTION      HX TRANSPLANT  2001     Kidney    HX TRANSPLANT  1992    kidney    HX VASCULAR ACCESS Right     femoral access for HD- does 5 day dialysis @ home    IR INSERT NON TUNL CVC OVER 5 YRS  12/7/2021    IR INSERT NON TUNL CVC OVER 5 YRS  12/10/2021    IR INSERT TIPS HEPATIC SHUNT  12/30/2021    IR REMOVE TUNL CVAD W/O PORT / PUMP  10/29/2020    IR REPLACE CVC TUNNELED W/O PORT  9/10/2020    NM CARDIAC SURG PROCEDURE UNLIST  2007    2 stents    NM EDG US EXAM SURGICAL ALTER STOM DUODENUM/JEJUNUM  7/15/2011         NM ESOPHAGOGASTRODUODENOSCOPY TRANSORAL DIAGNOSTIC  5/24/2012         UPPER GI ENDOSCOPY,CTRL BLEED  12/6/2021         UPPER GI ENDOSCOPY,DIAGNOSIS  12/8/2021         VASCULAR SURGERY PROCEDURE UNLIST  11/14/2017    Insertion AV graft right upper arm (bovine); ligation of AV fistula right arm       Medications Prior to Admission   Medication Sig    oxyCODONE-acetaminophen (PERCOCET) 5-325 mg per tablet Take 1 Tablet by mouth every four (4) hours as needed.  propranoloL (INDERAL) 10 mg tablet Take 10 mg by mouth two (2) times a day.  Vitamin D2 1,250 mcg (50,000 unit) capsule TAKE 1 CAPSULE BY MOUTH THREE  DAYS (TIMES) A WEEK    metoclopramide HCl (REGLAN) 5 mg tablet Take 1 Tablet by mouth two (2) times a day for 30 days.  pantoprazole (PROTONIX) 40 mg tablet Take 1 Tablet by mouth Before breakfast and dinner for 30 days.  ondansetron (Zofran ODT) 4 mg disintegrating tablet 1 Tab by SubLINGual route every eight (8) hours as needed for Nausea or Vomiting.  calcium acetate,phosphat bind, (PHOSLO) 667 mg cap Take 2 Caps by mouth three (3) times daily (with meals). Indications: low amount of calcium in the blood, renal osteodystrophy with hyperphosphatemia    atorvastatin (Lipitor) 20 mg tablet Take 20 mg by mouth daily.     acetaminophen (TYLENOL) 500 mg tablet Take 1 Tab by mouth every four (4) hours as needed for Pain. Over the counter    calcitRIOL (ROCALTROL) 0.5 mcg capsule Take 0.5 mcg by mouth daily. Hard scripts included in transfer packet no    Vaccinations:    Immunization History   Administered Date(s) Administered    Influenza Vaccine ([place] Canine Kidney) PF 09/16/2014    Influenza Vaccine Whole 10/01/2011    Pneumococcal Vaccine (Unspecified Type) 03/01/2012       Readmission Risks:    Known Risks: Fall        The Charlson CoMorbitiy Index tool is an evidenced based tool that has more automatic generated information. The tool looks at many different items such as the age of the patient, how many times they were admitted in the last calendar year, current length of stay in the hospital and their diagnosis. All of these items are pulled automatically from information documented in the chart from various places and will generate a score that predicts whether a patient is at low (less than 13), medium (13-20) or high (21 or greater) risk of being readmitted. ASSESSMENT                Temp: 97.8 °F (36.6 °C) (01/17/22 1140) Pulse (Heart Rate): 69 (01/17/22 1140)     Resp Rate: 18 (01/17/22 1140)           BP: 125/71 (01/17/22 1140)     O2 Sat (%): 93 % (01/16/22 2332)     Weight: 69 kg (152 lb 1.9 oz)    Height: 5' 7\" (170.2 cm) (01/04/22 0839)       If above not within 1 hour of discharge:    BP:_____  P:____  R:____ T:_____ O2 Sat: ___%  O2: ______    Active Orders   Diet    ADULT DIET Regular; Low Potassium (Less than 3000 mg/day); Buttered pecan Nepro please!          Orientation: oriented to time, place, person and situation     Active Behaviors: None                                   Active Lines/Drains:  (Peg Tube / Molina / CL or S/L?): no    Urinary Status: Voiding     Last BM: Last Bowel Movement Date: 01/16/22     Skin Integrity: Wound (add Wound LDA)             Mobility: Slightly limited   Weight Bearing Status: WBAT (Weight Bearing as Tolerated)      Gait Training  Assistive Device: Gait belt,Walker, rolling (with a seated rest between)  Ambulation - Level of Assistance: Minimal assistance  Distance (ft): 20 Feet (ft) (x2)         Lab Results   Component Value Date/Time    Glucose 72 01/17/2022 12:32 AM    Hemoglobin A1c 3.8 (L) 05/22/2012 05:00 PM    INR 1.2 (H) 12/26/2021 02:12 AM    INR 1.2 (H) 12/19/2021 11:39 AM    HGB 9.3 (L) 01/17/2022 12:32 AM    HGB 9.4 (L) 01/16/2022 03:02 AM        RECOMMENDATION     See After Visit Summary (AVS) for:  · Discharge instructions  · After [address]   · Special equipment needed (entered pre-discharge by Care Management)  · Medication Reconciliation    · Follow up Appointment(s)         Report given/sent by:  Sofi Barth RN                    Verbal report given to:  Lillian  FAXED to:  [facility]         Estimated discharge time:  1/17/2022 at 200

## 2022-01-18 NOTE — PROGRESS NOTES
Bedside and Verbal shift change report given to Sudhir (oncoming nurse) by Kevin Wright (offgoing nurse). Report included the following information SBAR, Kardex, ED Summary, Intake/Output, MAR, Accordion and Recent Results.

## 2022-02-01 NOTE — ED PROVIDER NOTES
HPI Comments: Placido Zaragoza, 46 y.o. Male with PMHx of HTN, CAD, AICD placement, SBO, PUD, and ESRD presents ambulatory to the ED with cc of 7/10 LLQ pain radiating to left side of back and vomiting x 0300. Pt reports 2 episodes of vomiting. Pt has been having normal BMs. 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 Pt receives at-home dialysis 5 days a week and has a dialysis shunt in his RUE. He is unable to produce his own urine. He finished a course of IV abx last week due to staph infection. He denies any fevers, chills, diarrhea, CP or SOB. PCP: Horace George MD    Social history significant for: - Tobacco, - EtOH, - Illicit Drug Use    There are no other complaints, changes, or physical findings at this time. Written by Albert Voss ED Scribe, as dictated by Carlos Manuel Meyers MD.      The history is provided by the patient. No  was used. Past Medical History:   Diagnosis Date    Abdominal hematoma     AICD (automatic cardioverter/defibrillator) present     CAD (coronary artery disease)     anterior MI s/p 2 stents  2007    Chronic abdominal pain     Chronic kidney disease     ESRD; Dialysis dependent.      DVT (deep venous thrombosis) ([address] 2001    DVT of popliteal vein ([address] 11/2011    not anticoagulated due to bleeding, IVC filter    Gallstone pancreatitis     Gastrointestinal disorder     acid reflux    Gastrointestinal disorder     peptic ulcer    Hemodialysis patient ([address]     High cholesterol     Hypertension     Kidney transplant     b/l kidneys 1995, 2001    Nephrotic syndrome     Other ill-defined conditions     kidny transplant x2,  on dialysis    Other ill-defined conditions     home dialysis    Other ill-defined conditions     hx recurrent left leg DVT    Peritonitis (HCC)     Seizures (HCC)     Small bowel obstruction (HCC)     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    V-tach [facility])        Past Surgical History:   Procedure Laterality Date    HX APPENDECTOMY      HX CHOLECYSTECTOMY      HX OTHER SURGICAL      cholecystectomy    HX OTHER SURGICAL  11/2011    exploratory laparotomy    HX OTHER SURGICAL      fem-pop    HX OTHER SURGICAL  02/2013    right upper extremity fistula    HX PACEMAKER  6/2009    AICD    HX SMALL BOWEL RESECTION      HX TRANSPLANT  2001     Kidney    HX UROLOGICAL      2 kidney transplants    [place] EDG US EXAM SURGICAL ALTER STOM DUODENUM/JEJUNUM  7/15/2011         [place] ESOPHAGOGASTRODUODENOSCOPY TRANSORAL DIAGNOSTIC  5/24/2012              Family History:   Problem Relation Age of Onset    COPD Mother     Lung Disease Mother     Cancer Father      stomach    Cancer Maternal Grandmother        Social History     Social History    Marital status: SINGLE     Spouse name: N/A    Number of children: N/A    Years of education: N/A     Occupational History    Not on file. Social History Main Topics    Smoking status: Never Smoker    Smokeless tobacco: Never Used    Alcohol use No    Drug use: No    Sexual activity: Not on file     Other Topics Concern    Not on file     Social History Narrative         ALLERGIES: Shellfish containing products; Heparin analogues; and Iodinated contrast- oral and iv dye    Review of Systems   Constitutional: Negative for chills and fever. HENT: Negative for congestion. Eyes: Negative. Respiratory: Negative for shortness of breath. Cardiovascular: Negative for chest pain. Gastrointestinal: Positive for abdominal pain (LLQ), nausea and vomiting. Negative for diarrhea. Endocrine: Negative for heat intolerance. Genitourinary: Negative for dysuria. Musculoskeletal: Positive for back pain. Skin: Negative for rash. Allergic/Immunologic: Negative for immunocompromised state. Neurological: Negative for dizziness. Hematological: Does not bruise/bleed easily. Psychiatric/Behavioral: Negative. All other systems reviewed and are negative. Patient Vitals for the past 12 hrs:   Temp Pulse Resp BP SpO2   06/30/17 0800 - - - 115/88 100 %   06/30/17 0730 - - - 120/80 100 %   06/30/17 0655 - - - 114/80 100 %   06/30/17 0624 98.9 °F (37.2 °C) 82 16 (!) 131/91 100 %          Physical Exam   Constitutional: He is oriented to person, place, and time. He appears well-developed and well-nourished. NAD   HENT:   Head: Normocephalic and atraumatic. Eyes: EOM are normal. Pupils are equal, round, and reactive to light. Neck: Normal range of motion. Neck supple. Cardiovascular: Normal rate, regular rhythm and normal heart sounds. Pulmonary/Chest: Effort normal and breath sounds normal. He has no wheezes. Abdominal: Soft. Bowel sounds are normal. There is tenderness (LLQ). Musculoskeletal: Normal range of motion. He exhibits no edema or tenderness. Neurological: He is alert and oriented to person, place, and time. No cranial nerve deficit. Skin: Skin is warm and dry. Shunt in RUE     Psychiatric: He has a normal mood and affect. Nursing note and vitals reviewed. MDM  Number of Diagnoses or Management Options  Abdominal pain, LLQ (left lower quadrant):   ESRD (end stage renal disease) ([facility]):   Nausea and vomiting, intractability of vomiting not specified, unspecified vomiting type:   Diagnosis management comments: DDx: diverticulitis, obstruction, kidney stone, gastritis        Amount and/or Complexity of Data Reviewed  Clinical lab tests: ordered and reviewed  Tests in the radiology section of CPT®: ordered and reviewed  Review and summarize past medical records: yes    Patient Progress  Patient progress: stable    ED Course       Procedures    7:24 AM    CT on 6/21 for LLQ pain was unremarkable   Written by Najma Kowalski ED Scribe, as dictated by Sandra Dubose MD.    9:20 AM   Pt's pain is 1/10.   Written by Najma Kowalski ED Scribe, as dictated by Enrrique Lafleur MD.     10:10 AM  Results are normal, will d/c home. Written by Patra Leventhal, ED Scribe, as dictated by Enrrique Lafleur MD.    LABORATORY TESTS:  Recent Results (from the past 12 hour(s))   CBC WITH AUTOMATED DIFF    Collection Time: 06/30/17  8:18 AM   Result Value Ref Range    WBC 8.7 4.1 - 11.1 K/uL    RBC 3.28 (L) 4.10 - 5.70 M/uL    HGB 10.3 (L) 12.1 - 17.0 g/dL    HCT 30.2 (L) 36.6 - 50.3 %    MCV 92.1 80.0 - 99.0 FL    MCH 31.4 26.0 - 34.0 PG    MCHC 34.1 30.0 - 36.5 g/dL    RDW 15.6 (H) 11.5 - 14.5 %    PLATELET 406 (L) 829 - 400 K/uL    NEUTROPHILS 72 32 - 75 %    LYMPHOCYTES 13 12 - 49 %    MONOCYTES 6 5 - 13 %    EOSINOPHILS 8 (H) 0 - 7 %    BASOPHILS 1 0 - 1 %    ABS. NEUTROPHILS 6.3 1.8 - 8.0 K/UL    ABS. LYMPHOCYTES 1.1 0.8 - 3.5 K/UL    ABS. MONOCYTES 0.6 0.0 - 1.0 K/UL    ABS. EOSINOPHILS 0.7 (H) 0.0 - 0.4 K/UL    ABS. BASOPHILS 0.1 0.0 - 0.1 K/UL   PROTHROMBIN TIME + INR    Collection Time: 06/30/17  8:18 AM   Result Value Ref Range    INR 1.7 (H) 0.9 - 1.1      Prothrombin time 17.1 (H) 9.0 - 11.1 sec   PTT    Collection Time: 06/30/17  8:18 AM   Result Value Ref Range    aPTT 33.4 (H) 22.1 - 32.5 sec    aPTT, therapeutic range     58.0 - 51.1 SECS   METABOLIC PANEL, COMPREHENSIVE    Collection Time: 06/30/17  8:18 AM   Result Value Ref Range    Sodium 132 (L) 136 - 145 mmol/L    Potassium 3.4 (L) 3.5 - 5.1 mmol/L    Chloride 96 (L) 97 - 108 mmol/L    CO2 26 21 - 32 mmol/L    Anion gap 10 5 - 15 mmol/L    Glucose 76 65 - 100 mg/dL    BUN 42 (H) 6 - 20 MG/DL    Creatinine 8.60 (H) 0.70 - 1.30 MG/DL    BUN/Creatinine ratio 5 (L) 12 - 20      GFR est AA 8 (L) >60 ml/min/1.73m2    GFR est non-AA 7 (L) >60 ml/min/1.73m2    Calcium 8.6 8.5 - 10.1 MG/DL    Bilirubin, total 1.0 0.2 - 1.0 MG/DL    ALT (SGPT) 10 (L) 12 - 78 U/L    AST (SGOT) 13 (L) 15 - 37 U/L    Alk.  phosphatase 149 (H) 45 - 117 U/L    Protein, total 7.4 6.4 - 8.2 g/dL    Albumin 3.3 (L) 3.5 - 5.0 g/dL    Globulin 4.1 (H) 2.0 - 4.0 g/dL    A-G Ratio 0.8 (L) 1.1 - 2.2     LIPASE    Collection Time: 06/30/17  8:18 AM   Result Value Ref Range    Lipase 237 73 - 393 U/L       IMAGING RESULTS:  CT ABD PELV WO CONT   Final Result   EXAM:  CT ABD PELV WO CONT     INDICATION:  Left lower quadrant pain and vomiting. Chronic renal failure with  previous failed renal transplants x2.     COMPARISON: 6/1/2017.     CONTRAST:  None.       TECHNIQUE:   Unenhanced multislice helical CT was performed from the diaphragm to the  symphysis pubis without intravenous contrast. Oral contrast material given. Contiguous 5 mm axial images were reconstructed and lung and soft tissue windows  were generated. Coronal and sagittal reformations were generated. CT dose  reduction was achieved through use of a standardized protocol tailored for this  examination and automatic exposure control for dose modulation.      FINDINGS:  LUNG: Minimal scarring noted in the left lung base. Lung bases are otherwise  clear. .     The absence of intravenous contrast material reduces the sensitivity for  evaluation of the solid parenchymal organs of the abdomen. LIVER: There is a cystic collection similar to the previous examination along  the medial border of the left lobe which extends inferior to the left lobe. There is no inflammatory component. There is a small elliptical fluid collection  along the posterior margin of the right lobe which is unchanged. GALLBLADDER: Absent by surgical history  SPLEEN: No focal lesion.        PANCREAS: There is some calcification in the tail and distal body of the  pancreas. This may be vascular, however and has not changed significantly. ADRENALS: No focal lesion. KIDNEYS: Small and atrophic with a cyst noted in the left kidney unchanged.     GI: Note is made of previous history of small bowel surgery. There is no  obstruction. .  APPENDIX: Absent     AORTA: The aorta tapers without aneurysm.    RETROPERITONEUM:  There is no retroperitoneal adenopathy or mass. PERITONEUM: There is a calcified atrophic renal transplant in the left lower  quadrant unchanged from the previous examination. In addition there is a rounded  partially calcified mesenteric mass unchanged from the previous study measuring  3 cm in cephalocaudad diameter. Aiyana Bellis  BLADDER: Empty  PELVIS: There is no pelvic mass or adenopathy.     BONES: There is increased density which is smudgy of the bones consistent with  renal osteodystrophy        IMPRESSION  IMPRESSION:    1. No significant change. 2. No acute finding. MEDICATIONS GIVEN:  Medications   HYDROmorphone (PF) (DILAUDID) injection 1 mg (1 mg IntraMUSCular Given 6/30/17 0746)   ondansetron (ZOFRAN ODT) tablet 4 mg (4 mg Oral Given 6/30/17 0746)   barium sulfate (READICAT) 2.1 % (w/v), 2.0 % (w/w) oral suspension 900 mL (900 mL Oral Given 6/30/17 0746)       IMPRESSION:  1. Abdominal pain, LLQ (left lower quadrant)    2. ESRD (end stage renal disease) ([facility]        PLAN:  1. Current Discharge Medication List      START taking these medications    Details   oxyCODONE-acetaminophen (PERCOCET) 5-325 mg per tablet Take 1 Tab by mouth every four (4) hours as needed for Pain. Max Daily Amount: 6 Tabs. Qty: 20 Tab, Refills: 0      ondansetron hcl (ZOFRAN, AS HYDROCHLORIDE,) 4 mg tablet Take 1 Tab by mouth every eight (8) hours as needed for Nausea. Qty: 20 Tab, Refills: 0           2. Follow-up Information     Follow up With Details Comments Contact Info    Julieta Stevens MD In 3 days As needed     [facility] EMERGENCY DEPT  If symptoms worsen [address] [place] [address]  [phone]        Return to ED if worse       Discharge Note:  10:12 AM  The pt is ready for discharge. The pt's signs, symptoms, diagnosis, and discharge instructions have been discussed and pt has conveyed their understanding. The pt is to follow up as recommended or return to ER should their symptoms worsen.  Plan has been discussed and pt is in agreement. This note is prepared by Nabila Gary, acting as a Scribe for Leti Hahn MD.    Leti Hahn MD: The scribe's documentation has been prepared under my direction and personally reviewed by me in its entirety. I confirm that the notes above accurately reflects all work, treatment, procedures, and medical decision making performed by me. Rifampin Counseling: I discussed with the patient the risks of rifampin including but not limited to liver damage, kidney damage, red-orange body fluids, nausea/vomiting and severe allergy.

## 2022-02-07 NOTE — PROGRESS NOTES
Pharmacist Daily Dosing of Warfarin    Indication & Goal INR: DVT, INR Goal 2-3    PTA Warfarin Dose: 2.5 mg daily    Notable concurrent conditions and medications: Aspirin    Labs:  Recent Labs     11/27/21  1520 11/27/21  1514 11/27/21  1405   INR  --  1.7*  --    HGB  --   --  7.6*   PLT  --   --  84*   TBILI 0.7  --   --    ALB 3.6  --   --          Impression/Plan:   Will order warfarin 2.5 mg PO x 1 dose. Daily INR has been ordered  CBC w/o differential every other day has been ordered     Pharmacy will follow daily and adjust the dose as appropriate.     Thank you,  MICHELE Crabtree Fluconazole Counseling:  Patient counseled regarding adverse effects of fluconazole including but not limited to headache, diarrhea, nausea, upset stomach, liver function test abnormalities, taste disturbance, and stomach pain.  There is a rare possibility of liver failure that can occur when taking fluconazole.  The patient understands that monitoring of LFTs and kidney function test may be required, especially at baseline. The patient verbalized understanding of the proper use and possible adverse effects of fluconazole.  All of the patient's questions and concerns were addressed.

## 2022-02-09 ENCOUNTER — VIRTUAL VISIT (OUTPATIENT)
Dept: INFECTIOUS DISEASES | Age: 47
End: 2022-02-09
Payer: MEDICARE

## 2022-02-09 DIAGNOSIS — A49.1 STREPTOCOCCUS VIRIDANS INFECTION: Primary | ICD-10-CM

## 2022-02-09 PROCEDURE — G8432 DEP SCR NOT DOC, RNG: HCPCS | Performed by: STUDENT IN AN ORGANIZED HEALTH CARE EDUCATION/TRAINING PROGRAM

## 2022-02-09 PROCEDURE — 1111F DSCHRG MED/CURRENT MED MERGE: CPT | Performed by: STUDENT IN AN ORGANIZED HEALTH CARE EDUCATION/TRAINING PROGRAM

## 2022-02-09 PROCEDURE — G8420 CALC BMI NORM PARAMETERS: HCPCS | Performed by: STUDENT IN AN ORGANIZED HEALTH CARE EDUCATION/TRAINING PROGRAM

## 2022-02-09 PROCEDURE — G9231 DOC ESRD DIA TRANS PREG: HCPCS | Performed by: STUDENT IN AN ORGANIZED HEALTH CARE EDUCATION/TRAINING PROGRAM

## 2022-02-09 PROCEDURE — 99212 OFFICE O/P EST SF 10 MIN: CPT | Performed by: STUDENT IN AN ORGANIZED HEALTH CARE EDUCATION/TRAINING PROGRAM

## 2022-02-09 PROCEDURE — G8427 DOCREV CUR MEDS BY ELIG CLIN: HCPCS | Performed by: STUDENT IN AN ORGANIZED HEALTH CARE EDUCATION/TRAINING PROGRAM

## 2022-02-09 RX ORDER — METOPROLOL SUCCINATE 25 MG/1
12.5 TABLET, EXTENDED RELEASE ORAL DAILY
COMMUNITY
Start: 2022-01-26

## 2022-02-09 RX ORDER — METOCLOPRAMIDE 5 MG/1
1 TABLET ORAL 2 TIMES DAILY
COMMUNITY
Start: 2021-12-18 | End: 2022-04-06

## 2022-02-09 NOTE — PROGRESS NOTES
Virtual Visit  Send link to [email or web address]    Identified pt with two pt identifiers(name and ). Reviewed record in preparation for visit and have obtained necessary documentation. Chief Complaint   Patient presents with   [address]      There were no vitals filed for this visit. Health Maintenance Review: Patient reminded of \"due or due soon\" health maintenance. I have asked the patient to contact his/her primary care provider (PCP) for follow-up on his/her health maintenance. Coordination of Care Questionnaire:  :   1) Have you been to an emergency room, urgent care, or hospitalized since your last visit? If yes, where when, and reason for visit? no       2. Have seen or consulted any other health care provider since your last visit? If yes, where when, and reason for visit? NO      Patient is accompanied by self I have received verbal consent from Conchis Kaur to discuss any/all medical information while they are present in the room.

## 2022-02-12 ENCOUNTER — HOSPITAL ENCOUNTER (EMERGENCY)
Age: 47
Discharge: HOME | End: 2022-02-12
Attending: EMERGENCY MEDICINE
Payer: MEDICARE

## 2022-02-12 ENCOUNTER — APPOINTMENT (OUTPATIENT)
Dept: CT IMAGING | Age: 47
End: 2022-02-12
Attending: EMERGENCY MEDICINE
Payer: MEDICARE

## 2022-02-12 VITALS
SYSTOLIC BLOOD PRESSURE: 126 MMHG | OXYGEN SATURATION: 99 % | HEART RATE: 94 BPM | DIASTOLIC BLOOD PRESSURE: 81 MMHG | RESPIRATION RATE: 22 BRPM | BODY MASS INDEX: 24.02 KG/M2 | HEIGHT: 66 IN | TEMPERATURE: 98.4 F | WEIGHT: 149.47 LBS

## 2022-02-12 DIAGNOSIS — R11.10 VOMITING, INTRACTABILITY OF VOMITING NOT SPECIFIED, PRESENCE OF NAUSEA NOT SPECIFIED, UNSPECIFIED VOMITING TYPE: Primary | ICD-10-CM

## 2022-02-12 LAB
ALBUMIN SERPL-MCNC: 2.2 G/DL (ref 3.5–5)
ALBUMIN/GLOB SERPL: 0.4 {RATIO} (ref 1.1–2.2)
ALP SERPL-CCNC: 186 U/L (ref 45–117)
ALT SERPL-CCNC: 15 U/L (ref 12–78)
ANION GAP SERPL CALC-SCNC: 6 MMOL/L (ref 5–15)
AST SERPL-CCNC: 27 U/L (ref 15–37)
BASOPHILS # BLD: 0.1 K/UL (ref 0–0.1)
BASOPHILS NFR BLD: 1 % (ref 0–1)
BILIRUB SERPL-MCNC: 0.4 MG/DL (ref 0.2–1)
BUN SERPL-MCNC: 14 MG/DL (ref 6–20)
BUN/CREAT SERPL: 4 (ref 12–20)
CALCIUM SERPL-MCNC: 7.5 MG/DL (ref 8.5–10.1)
CHLORIDE SERPL-SCNC: 98 MMOL/L (ref 97–108)
CO2 SERPL-SCNC: 32 MMOL/L (ref 21–32)
CREAT SERPL-MCNC: 3.49 MG/DL (ref 0.7–1.3)
DIFFERENTIAL METHOD BLD: ABNORMAL
EOSINOPHIL # BLD: 0.3 K/UL (ref 0–0.4)
EOSINOPHIL NFR BLD: 4 % (ref 0–7)
ERYTHROCYTE [DISTWIDTH] IN BLOOD BY AUTOMATED COUNT: 17 % (ref 11.5–14.5)
GLOBULIN SER CALC-MCNC: 5.8 G/DL (ref 2–4)
GLUCOSE SERPL-MCNC: 94 MG/DL (ref 65–100)
HCT VFR BLD AUTO: 25.5 % (ref 36.6–50.3)
HGB BLD-MCNC: 7.9 G/DL (ref 12.1–17)
IMM GRANULOCYTES # BLD AUTO: 0 K/UL (ref 0–0.04)
IMM GRANULOCYTES NFR BLD AUTO: 0 % (ref 0–0.5)
LIPASE SERPL-CCNC: 139 U/L (ref 73–393)
LYMPHOCYTES # BLD: 1.8 K/UL (ref 0.8–3.5)
LYMPHOCYTES NFR BLD: 24 % (ref 12–49)
MCH RBC QN AUTO: 27.3 PG (ref 26–34)
MCHC RBC AUTO-ENTMCNC: 31 G/DL (ref 30–36.5)
MCV RBC AUTO: 88.2 FL (ref 80–99)
MONOCYTES # BLD: 0.8 K/UL (ref 0–1)
MONOCYTES NFR BLD: 11 % (ref 5–13)
NEUTS SEG # BLD: 4.6 K/UL (ref 1.8–8)
NEUTS SEG NFR BLD: 61 % (ref 32–75)
NRBC # BLD: 0 K/UL (ref 0–0.01)
NRBC BLD-RTO: 0 PER 100 WBC
PLATELET # BLD AUTO: 132 K/UL (ref 150–400)
PMV BLD AUTO: 11.1 FL (ref 8.9–12.9)
POTASSIUM SERPL-SCNC: 3.9 MMOL/L (ref 3.5–5.1)
PROT SERPL-MCNC: 8 G/DL (ref 6.4–8.2)
RBC # BLD AUTO: 2.89 M/UL (ref 4.1–5.7)
SODIUM SERPL-SCNC: 136 MMOL/L (ref 136–145)
WBC # BLD AUTO: 7.6 K/UL (ref 4.1–11.1)

## 2022-02-12 PROCEDURE — 74176 CT ABD & PELVIS W/O CONTRAST: CPT

## 2022-02-12 PROCEDURE — 96374 THER/PROPH/DIAG INJ IV PUSH: CPT

## 2022-02-12 PROCEDURE — 96376 TX/PRO/DX INJ SAME DRUG ADON: CPT

## 2022-02-12 PROCEDURE — 74011250636 HC RX REV CODE- 250/636: Performed by: EMERGENCY MEDICINE

## 2022-02-12 PROCEDURE — 99283 EMERGENCY DEPT VISIT LOW MDM: CPT

## 2022-02-12 PROCEDURE — 80053 COMPREHEN METABOLIC PANEL: CPT

## 2022-02-12 PROCEDURE — 85025 COMPLETE CBC W/AUTO DIFF WBC: CPT

## 2022-02-12 PROCEDURE — 83690 ASSAY OF LIPASE: CPT

## 2022-02-12 PROCEDURE — 96375 TX/PRO/DX INJ NEW DRUG ADDON: CPT

## 2022-02-12 PROCEDURE — 36415 COLL VENOUS BLD VENIPUNCTURE: CPT

## 2022-02-12 RX ORDER — FENTANYL CITRATE 50 UG/ML
50 INJECTION, SOLUTION INTRAMUSCULAR; INTRAVENOUS ONCE
Status: COMPLETED | OUTPATIENT
Start: 2022-02-12 | End: 2022-02-12

## 2022-02-12 RX ORDER — ONDANSETRON 2 MG/ML
4 INJECTION INTRAMUSCULAR; INTRAVENOUS
Status: COMPLETED | OUTPATIENT
Start: 2022-02-12 | End: 2022-02-12

## 2022-02-12 RX ADMIN — FENTANYL CITRATE 50 MCG: 50 INJECTION, SOLUTION INTRAMUSCULAR; INTRAVENOUS at 18:28

## 2022-02-12 RX ADMIN — ONDANSETRON HYDROCHLORIDE 4 MG: 2 INJECTION, SOLUTION INTRAMUSCULAR; INTRAVENOUS at 15:47

## 2022-02-12 RX ADMIN — FENTANYL CITRATE 50 MCG: 50 INJECTION, SOLUTION INTRAMUSCULAR; INTRAVENOUS at 16:49

## 2022-02-12 NOTE — ED PROVIDER NOTES
EMERGENCY DEPARTMENT HISTORY AND PHYSICAL EXAM      Date: 2/12/2022  Patient Name: Wolfgang Berumen    History of Presenting Illness     Chief Complaint   Patient presents with    Vomiting     nausea vomiting stomach pain since after dialysis today. it finished at 1030am.         HPI: Wolfgang Berumen, 42 y.o. male reporting nausea, vomiting, and epigastric pain. Onset immediately after dialysis. Describes vomiting as NBNB. Pain w/o radiation. Pt w complex hx including multiple abd surgeries. There are no other complaints, changes, or physical findings at this time. PCP: Demetris Hernandez MD    No current facility-administered medications on file prior to encounter. Current Outpatient Medications on File Prior to Encounter   Medication Sig Dispense Refill    metoclopramide HCl (REGLAN) 5 mg tablet Take 1 Tablet by mouth two (2) times a day.  metoprolol succinate (TOPROL-XL) 25 mg XL tablet Take 12.5 mg by mouth daily.  pantoprazole (PROTONIX) 40 mg tablet Take 1 Tablet by mouth Before breakfast and dinner. 60 Tablet 2    sucralfate (CARAFATE) 1 gram tablet Take 1 Tablet by mouth Before breakfast, lunch, dinner and at bedtime. 120 Tablet 2    polyethylene glycol (MIRALAX) 17 gram packet Take 1 Packet by mouth daily. 30 Packet 0    propranoloL (INDERAL) 10 mg tablet Take 10 mg by mouth two (2) times a day.  Vitamin D2 1,250 mcg (50,000 unit) capsule TAKE 1 CAPSULE BY MOUTH THREE  DAYS (TIMES) A WEEK      ondansetron (Zofran ODT) 4 mg disintegrating tablet 1 Tab by SubLINGual route every eight (8) hours as needed for Nausea or Vomiting. 20 Tab 0    calcium acetate,phosphat bind, (PHOSLO) 667 mg cap Take 2 Caps by mouth three (3) times daily (with meals). Indications: low amount of calcium in the blood, renal osteodystrophy with hyperphosphatemia 120 Cap 0    atorvastatin (Lipitor) 20 mg tablet Take 20 mg by mouth daily.       acetaminophen (TYLENOL) 500 mg tablet Take 1 Tab by mouth every four (4) hours as needed for Pain. Over the counter 30 Tab 0    calcitRIOL (ROCALTROL) 0.5 mcg capsule Take 0.5 mcg by mouth daily. Past History     Past Medical History:  Past Medical History:   Diagnosis Date    Abdominal hematoma     AICD (automatic cardioverter/defibrillator) present     CAD (coronary artery disease)     anterior MI s/p 2 stents  2007    Chronic abdominal pain     Chronic kidney disease     ESRD; Dialysis dependent.  Home [facility] does labs- [facility] tpke    DVT (deep venous thrombosis) ([address] 2001    DVT of popliteal vein ([address] 11/2011    not anticoagulated due to bleeding, IVC filter    Endocarditis     Gallstone pancreatitis     Gastrointestinal disorder     acid reflux    Gastrointestinal disorder     peptic ulcer    Hemodialysis patient ([address]     High cholesterol     Hypertension     Kidney transplant     b/l kidneys 1995, 2001    Long term current use of anticoagulant therapy     Nephrotic syndrome     Other ill-defined conditions(799.89)     kidny transplant x2,  on dialysis    Other ill-defined conditions(799.89)     home dialysis    Other ill-defined conditions(799.89)     hx recurrent left leg DVT    Peritonitis ([address]     Seizures ([address] 2015    most recent- only had three in lifetime    Small bowel obstruction (HCC)     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    V-[facility])        Past Surgical History:  Past Surgical History:   Procedure Laterality Date    HX APPENDECTOMY      HX CHOLECYSTECTOMY      HX OTHER SURGICAL  11/2011    exploratory laparotomy    HX OTHER SURGICAL      fem-pop    HX OTHER SURGICAL  02/2013    right upper extremity fistula    HX PACEMAKER Left 6/2009    AICD-st latonya-not connected    HX PACEMAKER Left     AICD-left side-BS-working    HX SMALL BOWEL RESECTION      HX TRANSPLANT  2001     Kidney    HX TRANSPLANT  1992    kidney    HX VASCULAR ACCESS Right     femoral access for HD- does 5 day dialysis @ home    IR INSERT NON TUNL CVC OVER 5 YRS  12/7/2021    IR INSERT NON TUNL CVC OVER 5 YRS  12/10/2021    IR INSERT TIPS HEPATIC SHUNT  12/30/2021    IR REMOVE TUNL CVAD W/O PORT / PUMP  10/29/2020    IR REPLACE CVC TUNNELED W/O PORT  9/10/2020    MO CARDIAC SURG PROCEDURE UNLIST  2007    2 stents    MO EDG US EXAM SURGICAL ALTER STOM DUODENUM/JEJUNUM  7/15/2011         MO ESOPHAGOGASTRODUODENOSCOPY TRANSORAL DIAGNOSTIC  5/24/2012         UPPER GI ENDOSCOPY,CTRL BLEED  12/6/2021         UPPER GI ENDOSCOPY,DIAGNOSIS  12/8/2021         VASCULAR SURGERY PROCEDURE UNLIST  11/14/2017    Insertion AV graft right upper arm (bovine); ligation of AV fistula right arm       Family History:  Family History   Problem Relation Age of Onset    COPD Mother     Lung Disease Mother     Cancer Father         stomach    Cancer Maternal Grandmother        Social History:  Social History     Tobacco Use    Smoking status: Never Smoker    Smokeless tobacco: Never Used   Substance Use Topics    Alcohol use: No    Drug use: Yes     Types: Prescription, OTC       Allergies: Allergies   Allergen Reactions    Shellfish Containing Products Swelling     Break out in rash, trouble breathing    Contrast Agent [Iodine] Shortness of Breath    Heparin Analogues Other (comments)     Positive history of HIT         Review of Systems   Review of Systems   Constitutional: Negative for chills and fever. HENT: Negative for sore throat. Eyes: Negative for redness. Respiratory: Negative for shortness of breath. Cardiovascular: Negative for chest pain. Gastrointestinal: Positive for abdominal pain, nausea and vomiting. Genitourinary: Negative for dysuria. Musculoskeletal: Negative for back pain. Neurological: Negative for syncope. Psychiatric/Behavioral: The patient is not nervous/anxious. All other systems reviewed and are negative. Physical Exam   Physical Exam  Vitals and nursing note reviewed. Constitutional:       Appearance: Normal appearance. HENT:      Head: Normocephalic and atraumatic. Mouth/Throat:      Mouth: Mucous membranes are moist.   Cardiovascular:      Rate and Rhythm: Normal rate and regular rhythm. Pulmonary:      Effort: Pulmonary effort is normal.      Breath sounds: Normal breath sounds. Abdominal:      Palpations: Abdomen is soft. Tenderness: There is no abdominal tenderness. Musculoskeletal:         General: No deformity. Cervical back: Neck supple. Skin:     General: Skin is warm and dry. Neurological:      General: No focal deficit present. Mental Status: He is alert. Psychiatric:         Mood and Affect: Mood normal.         Behavior: Behavior normal.         Diagnostic Study Results     Labs -     Recent Results (from the past 24 hour(s))   CBC WITH AUTOMATED DIFF    Collection Time: 02/12/22  3:12 PM   Result Value Ref Range    WBC 7.6 4.1 - 11.1 K/uL    RBC 2.89 (L) 4.10 - 5.70 M/uL    HGB 7.9 (L) 12.1 - 17.0 g/dL    HCT 25.5 (L) 36.6 - 50.3 %    MCV 88.2 80.0 - 99.0 FL    MCH 27.3 26.0 - 34.0 PG    MCHC 31.0 30.0 - 36.5 g/dL    RDW 17.0 (H) 11.5 - 14.5 %    PLATELET 460 (L) 318 - 400 K/uL    MPV 11.1 8.9 - 12.9 FL    NRBC 0.0 0  WBC    ABSOLUTE NRBC 0.00 0.00 - 0.01 K/uL    NEUTROPHILS 61 32 - 75 %    LYMPHOCYTES 24 12 - 49 %    MONOCYTES 11 5 - 13 %    EOSINOPHILS 4 0 - 7 %    BASOPHILS 1 0 - 1 %    IMMATURE GRANULOCYTES 0 0.0 - 0.5 %    ABS. NEUTROPHILS 4.6 1.8 - 8.0 K/UL    ABS. LYMPHOCYTES 1.8 0.8 - 3.5 K/UL    ABS. MONOCYTES 0.8 0.0 - 1.0 K/UL    ABS. EOSINOPHILS 0.3 0.0 - 0.4 K/UL    ABS. BASOPHILS 0.1 0.0 - 0.1 K/UL    ABS. IMM.  GRANS. 0.0 0.00 - 0.04 K/UL    DF AUTOMATED     METABOLIC PANEL, COMPREHENSIVE    Collection Time: 02/12/22  3:12 PM   Result Value Ref Range    Sodium 136 136 - 145 mmol/L    Potassium 3.9 3.5 - 5.1 mmol/L    Chloride 98 97 - 108 mmol/L    CO2 32 21 - 32 mmol/L    Anion gap 6 5 - 15 mmol/L    Glucose 94 65 - 100 mg/dL    BUN 14 6 - 20 MG/DL    Creatinine 3.49 (H) 0.70 - 1.30 MG/DL    BUN/Creatinine ratio 4 (L) 12 - 20      GFR est AA 23 (L) >60 ml/min/1.73m2    GFR est non-AA 19 (L) >60 ml/min/1.73m2    Calcium 7.5 (L) 8.5 - 10.1 MG/DL    Bilirubin, total 0.4 0.2 - 1.0 MG/DL    ALT (SGPT) 15 12 - 78 U/L    AST (SGOT) 27 15 - 37 U/L    Alk. phosphatase 186 (H) 45 - 117 U/L    Protein, total 8.0 6.4 - 8.2 g/dL    Albumin 2.2 (L) 3.5 - 5.0 g/dL    Globulin 5.8 (H) 2.0 - 4.0 g/dL    A-G Ratio 0.4 (L) 1.1 - 2.2     LIPASE    Collection Time: 02/12/22  4:42 PM   Result Value Ref Range    Lipase 139 73 - 393 U/L       Radiologic Studies -   CT ABD PELV WO CONT   Final Result      1. Bilateral pleural effusions and adjacent atelectasis. 2. Anasarca. 3. Atrophy of native kidneys and densely calcified bilateral transplant kidneys. 4. Moderate distention of the stomach. 5. Fecal retention in the colon with mild colonic distention. 6. Renal osteodystrophy. CT Results  (Last 48 hours)               02/12/22 1626  CT ABD PELV WO CONT Final result    Impression:      1. Bilateral pleural effusions and adjacent atelectasis. 2. Anasarca. 3. Atrophy of native kidneys and densely calcified bilateral transplant kidneys. 4. Moderate distention of the stomach. 5. Fecal retention in the colon with mild colonic distention. 6. Renal osteodystrophy. Narrative:  EXAM: CT ABD PELV WO CONT       INDICATION: abd pain       COMPARISON: CT of 12/19/2021       IV CONTRAST: None. ORAL CONTRAST: None       TECHNIQUE:    Thin axial images were obtained through the abdomen and pelvis. Coronal and   sagittal reformats were generated. CT dose reduction was achieved through use of   a standardized protocol tailored for this examination and automatic exposure   control for dose modulation.         The absence of intravenous contrast material reduces the sensitivity for   evaluation of the vasculature and solid organs. FINDINGS:    LOWER THORAX: Bilateral pleural effusions, right greater than left. Moderate   interstitial edema. LIVER: Mild generalized hepatomegaly. No focal lesions. BILIARY TREE: Previous cholecystectomy. No biliary dilatation. SPLEEN: within normal limits. PANCREAS: No focal abnormality. ADRENALS: No focal abnormalities. KIDNEYS/URETERS: Markedly atrophic native kidneys. Extensive vascular   calcification. Atrophic calcified bilateral iliac fossa transplant kidneys. STOMACH: Moderately distended. SMALL BOWEL: No dilatation or wall thickening. COLON: Generalized fecal retention with mild distention. APPENDIX: Not identified. PERITONEUM: No ascites or pneumoperitoneum. Unchanged rim calcified structure in   the right upper quadrant mesentery. RETROPERITONEUM: Atherosclerotic aorta without aneurysm. REPRODUCTIVE ORGANS: Unremarkable. URINARY BLADDER: No mass or calculus. BONES: Sclerotic change most consistent with renal osteodystrophy. ABDOMINAL WALL: Marked anasarca. ADDITIONAL COMMENTS: N/A               CXR Results  (Last 48 hours)    None            Medical Decision Making   I am the first provider for this patient. I reviewed the vital signs, available nursing notes, past medical history, past surgical history, family history and social history. Vital Signs-Reviewed the patient's vital signs. Patient Vitals for the past 24 hrs:   Temp Pulse Resp BP SpO2   02/12/22 1826  94  117/75 99 %   02/12/22 1650     100 %   02/12/22 1647    125/85 98 %   02/12/22 1501 98.4 °F (36.9 °C) (!) 103 22 122/75 99 %         Provider Notes (Medical Decision Making):   Pleasant 42year-old presenting to ED with chief complaint of epigastric pain, nausea, vomiting. Never seen to vomit in the ED. His abdominal exam is benign. CT demonstrates no acute abnormalities. Labs are all reassuring.   Sounds like he may have had nausea and vomiting associated with this dialysis. Does not sound like cardiac etiology. Ultimately, patient was p.o. challenged and I think he safe for discharge. He will be advised to follow-up with his physician for further evaluation. ED Course:     Initial assessment performed. The patients presenting problems have been discussed, and they are in agreement with the care plan formulated and outlined with them. I have encouraged them to ask questions as they arise throughout their visit. Disposition:  dc    PLAN:  1. Current Discharge Medication List        2.    Follow-up Information    None       Return to ED if worse     Diagnosis     Clinical Impression: N/V

## 2022-02-13 NOTE — ED NOTES
Pt was provided with juice for PO challenge, he was able to drink the juice and keep it down with no complaints / complications, MD aware

## 2022-02-13 NOTE — ED NOTES
Judge Herron RN reviewed discharge instructions with the patient. The patient verbalized understanding. All questions and concerns were addressed.   The patient is discharged ambulatory with instructions and prescriptions in hand

## 2022-02-23 ENCOUNTER — HOSPITAL ENCOUNTER (EMERGENCY)
Age: 47
Discharge: HOME | End: 2022-02-23
Attending: EMERGENCY MEDICINE
Payer: MEDICARE

## 2022-02-23 ENCOUNTER — APPOINTMENT (OUTPATIENT)
Dept: CT IMAGING | Age: 47
End: 2022-02-23
Attending: EMERGENCY MEDICINE
Payer: MEDICARE

## 2022-02-23 VITALS
WEIGHT: 157.63 LBS | HEART RATE: 86 BPM | TEMPERATURE: 97.9 F | BODY MASS INDEX: 25.44 KG/M2 | OXYGEN SATURATION: 100 % | DIASTOLIC BLOOD PRESSURE: 87 MMHG | RESPIRATION RATE: 16 BRPM | SYSTOLIC BLOOD PRESSURE: 132 MMHG

## 2022-02-23 DIAGNOSIS — J90 BILATERAL PLEURAL EFFUSION: ICD-10-CM

## 2022-02-23 DIAGNOSIS — R10.9 ACUTE ABDOMINAL PAIN: Primary | ICD-10-CM

## 2022-02-23 DIAGNOSIS — N18.6 ESRD (END STAGE RENAL DISEASE) (HCC): ICD-10-CM

## 2022-02-23 LAB
ALBUMIN SERPL-MCNC: 2.4 G/DL (ref 3.5–5)
ALBUMIN/GLOB SERPL: 0.4 {RATIO} (ref 1.1–2.2)
ALP SERPL-CCNC: 299 U/L (ref 45–117)
ALT SERPL-CCNC: 12 U/L (ref 12–78)
ANION GAP SERPL CALC-SCNC: 7 MMOL/L (ref 5–15)
AST SERPL-CCNC: 20 U/L (ref 15–37)
BASOPHILS # BLD: 0.1 K/UL (ref 0–0.1)
BASOPHILS NFR BLD: 2 % (ref 0–1)
BILIRUB SERPL-MCNC: 0.4 MG/DL (ref 0.2–1)
BUN SERPL-MCNC: 51 MG/DL (ref 6–20)
BUN/CREAT SERPL: 6 (ref 12–20)
CALCIUM SERPL-MCNC: 6.5 MG/DL (ref 8.5–10.1)
CHLORIDE SERPL-SCNC: 95 MMOL/L (ref 97–108)
CO2 SERPL-SCNC: 30 MMOL/L (ref 21–32)
CREAT SERPL-MCNC: 9.27 MG/DL (ref 0.7–1.3)
DIFFERENTIAL METHOD BLD: ABNORMAL
EOSINOPHIL # BLD: 0.3 K/UL (ref 0–0.4)
EOSINOPHIL NFR BLD: 4 % (ref 0–7)
ERYTHROCYTE [DISTWIDTH] IN BLOOD BY AUTOMATED COUNT: 18.6 % (ref 11.5–14.5)
GLOBULIN SER CALC-MCNC: 5.7 G/DL (ref 2–4)
GLUCOSE SERPL-MCNC: 78 MG/DL (ref 65–100)
HCT VFR BLD AUTO: 31.3 % (ref 36.6–50.3)
HGB BLD-MCNC: 9.7 G/DL (ref 12.1–17)
IMM GRANULOCYTES # BLD AUTO: 0 K/UL (ref 0–0.04)
IMM GRANULOCYTES NFR BLD AUTO: 0 % (ref 0–0.5)
LIPASE SERPL-CCNC: 61 U/L (ref 73–393)
LYMPHOCYTES # BLD: 2 K/UL (ref 0.8–3.5)
LYMPHOCYTES NFR BLD: 24 % (ref 12–49)
MCH RBC QN AUTO: 28.6 PG (ref 26–34)
MCHC RBC AUTO-ENTMCNC: 31 G/DL (ref 30–36.5)
MCV RBC AUTO: 92.3 FL (ref 80–99)
MONOCYTES # BLD: 0.9 K/UL (ref 0–1)
MONOCYTES NFR BLD: 10 % (ref 5–13)
NEUTS SEG # BLD: 5.1 K/UL (ref 1.8–8)
NEUTS SEG NFR BLD: 60 % (ref 32–75)
NRBC # BLD: 0 K/UL (ref 0–0.01)
NRBC BLD-RTO: 0 PER 100 WBC
PLATELET # BLD AUTO: 197 K/UL (ref 150–400)
PMV BLD AUTO: 10.7 FL (ref 8.9–12.9)
POTASSIUM SERPL-SCNC: 5.1 MMOL/L (ref 3.5–5.1)
PROT SERPL-MCNC: 8.1 G/DL (ref 6.4–8.2)
RBC # BLD AUTO: 3.39 M/UL (ref 4.1–5.7)
SODIUM SERPL-SCNC: 132 MMOL/L (ref 136–145)
WBC # BLD AUTO: 8.5 K/UL (ref 4.1–11.1)

## 2022-02-23 PROCEDURE — 85025 COMPLETE CBC W/AUTO DIFF WBC: CPT

## 2022-02-23 PROCEDURE — 96375 TX/PRO/DX INJ NEW DRUG ADDON: CPT

## 2022-02-23 PROCEDURE — 83690 ASSAY OF LIPASE: CPT

## 2022-02-23 PROCEDURE — 80053 COMPREHEN METABOLIC PANEL: CPT

## 2022-02-23 PROCEDURE — 96374 THER/PROPH/DIAG INJ IV PUSH: CPT

## 2022-02-23 PROCEDURE — 74011250636 HC RX REV CODE- 250/636: Performed by: EMERGENCY MEDICINE

## 2022-02-23 PROCEDURE — 74176 CT ABD & PELVIS W/O CONTRAST: CPT

## 2022-02-23 PROCEDURE — 99282 EMERGENCY DEPT VISIT SF MDM: CPT

## 2022-02-23 RX ORDER — HYDROCODONE BITARTRATE AND ACETAMINOPHEN 5; 325 MG/1; MG/1
1 TABLET ORAL
Qty: 10 TABLET | Refills: 0 | Status: SHIPPED | OUTPATIENT
Start: 2022-02-23 | End: 2022-02-26

## 2022-02-23 RX ORDER — FENTANYL CITRATE 50 UG/ML
25 INJECTION, SOLUTION INTRAMUSCULAR; INTRAVENOUS
Status: COMPLETED | OUTPATIENT
Start: 2022-02-23 | End: 2022-02-23

## 2022-02-23 RX ORDER — ONDANSETRON 2 MG/ML
4 INJECTION INTRAMUSCULAR; INTRAVENOUS
Status: COMPLETED | OUTPATIENT
Start: 2022-02-23 | End: 2022-02-23

## 2022-02-23 RX ORDER — DICYCLOMINE HYDROCHLORIDE 20 MG/1
20 TABLET ORAL
Qty: 20 TABLET | Refills: 0 | Status: SHIPPED | OUTPATIENT
Start: 2022-02-23 | End: 2022-04-06

## 2022-02-23 RX ORDER — MORPHINE SULFATE 2 MG/ML
2 INJECTION, SOLUTION INTRAMUSCULAR; INTRAVENOUS
Status: COMPLETED | OUTPATIENT
Start: 2022-02-23 | End: 2022-02-23

## 2022-02-23 RX ADMIN — FENTANYL CITRATE 25 MCG: 50 INJECTION, SOLUTION INTRAMUSCULAR; INTRAVENOUS at 19:47

## 2022-02-23 RX ADMIN — MORPHINE SULFATE 2 MG: 2 INJECTION, SOLUTION INTRAMUSCULAR; INTRAVENOUS at 21:37

## 2022-02-23 RX ADMIN — ONDANSETRON 4 MG: 2 INJECTION INTRAMUSCULAR; INTRAVENOUS at 19:47

## 2022-02-24 NOTE — ED PROVIDER NOTES
EMERGENCY DEPARTMENT HISTORY AND PHYSICAL EXAM      Date: 2/23/2022  Patient Name: Miriam Mccord    History of Presenting Illness     Chief Complaint   Patient presents with    Abdominal Pain     pain at [facility] started this am with vomiting. Pain has persisted. History Provided By: Patient    HPI: Miriam Mccord, 42 y.o. male presents to the ED with cc of left lower quadrant pain. Patient symptoms started this morning. Pain has been constant. He has had 4 episodes of vomiting. He says his pain is a 7 out of 10 in severity. He denies back pain, chest pain, cough, fever, shortness of breath. He does not make urine. He is on dialysis, and is due for dialysis tomorrow. Denies diarrhea or constipation. Denies lightheadedness or dizziness. There are no other complaints, changes, or physical findings at this time. PCP: Mana Montez MD    No current facility-administered medications on file prior to encounter. Current Outpatient Medications on File Prior to Encounter   Medication Sig Dispense Refill    metoclopramide HCl (REGLAN) 5 mg tablet Take 1 Tablet by mouth two (2) times a day.  metoprolol succinate (TOPROL-XL) 25 mg XL tablet Take 12.5 mg by mouth daily.  pantoprazole (PROTONIX) 40 mg tablet Take 1 Tablet by mouth Before breakfast and dinner. 60 Tablet 2    sucralfate (CARAFATE) 1 gram tablet Take 1 Tablet by mouth Before breakfast, lunch, dinner and at bedtime. 120 Tablet 2    polyethylene glycol (MIRALAX) 17 gram packet Take 1 Packet by mouth daily. 30 Packet 0    propranoloL (INDERAL) 10 mg tablet Take 10 mg by mouth two (2) times a day.  Vitamin D2 1,250 mcg (50,000 unit) capsule TAKE 1 CAPSULE BY MOUTH THREE  DAYS (TIMES) A WEEK      ondansetron (Zofran ODT) 4 mg disintegrating tablet 1 Tab by SubLINGual route every eight (8) hours as needed for Nausea or Vomiting.  20 Tab 0    calcium acetate,phosphat bind, (PHOSLO) 667 mg cap Take 2 Caps by mouth three (3) times daily (with meals). Indications: low amount of calcium in the blood, renal osteodystrophy with hyperphosphatemia 120 Cap 0    atorvastatin (Lipitor) 20 mg tablet Take 20 mg by mouth daily.  acetaminophen (TYLENOL) 500 mg tablet Take 1 Tab by mouth every four (4) hours as needed for Pain. Over the counter 30 Tab 0    calcitRIOL (ROCALTROL) 0.5 mcg capsule Take 0.5 mcg by mouth daily. Past History     Past Medical History:  Past Medical History:   Diagnosis Date    Abdominal hematoma     AICD (automatic cardioverter/defibrillator) present     CAD (coronary artery disease)     anterior MI s/p 2 stents  2007    Chronic abdominal pain     Chronic kidney disease     ESRD; Dialysis dependent.  [facility]- [facility] t[facility]    DVT (deep venous thrombosis) ([address] 2001    DVT of popliteal vein ([address] 11/2011    not anticoagulated due to bleeding, IVC filter    Endocarditis     Gallstone pancreatitis     Gastrointestinal disorder     acid reflux    Gastrointestinal disorder     peptic ulcer    Hemodialysis patient ([address]     High cholesterol     Hypertension     Kidney transplant     b/l kidneys 1995, 2001    Long term current use of anticoagulant therapy     Nephrotic syndrome     Other ill-defined conditions(799.89)     kidny transplant x2,  on dialysis    Other ill-defined conditions(799.89)     home dialysis    Other ill-defined conditions(799.89)     hx recurrent left leg DVT    Peritonitis ([address]     Seizures ([address] 2015    most recent- only had three in lifetime    Small bowel obstruction (HCC)     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    [facility])        Past Surgical History:  Past Surgical History:   Procedure Laterality Date    HX APPENDECTOMY      HX CHOLECYSTECTOMY      HX OTHER SURGICAL  11/2011    exploratory laparotomy    HX OTHER SURGICAL      fem-pop    HX OTHER SURGICAL  02/2013    right upper extremity fistula    HX PACEMAKER Left 6/2009    AICD-st latonya-not connected    HX PACEMAKER Left     AICD-left side-BS-working    HX SMALL BOWEL RESECTION      HX TRANSPLANT  2001     Kidney    HX TRANSPLANT  1992    kidney    HX VASCULAR ACCESS Right     femoral access for HD- does 5 day dialysis @ home    IR INSERT NON TUNL CVC OVER 5 YRS  12/7/2021    IR INSERT NON TUNL CVC OVER 5 YRS  12/10/2021    IR INSERT TIPS HEPATIC SHUNT  12/30/2021    IR REMOVE TUNL CVAD W/O PORT / PUMP  10/29/2020    IR REPLACE CVC TUNNELED W/O PORT  9/10/2020    TX CARDIAC SURG PROCEDURE UNLIST  2007    2 stents    TX EDG US EXAM SURGICAL ALTER STOM DUODENUM/JEJUNUM  7/15/2011         TX ESOPHAGOGASTRODUODENOSCOPY TRANSORAL DIAGNOSTIC  5/24/2012         UPPER GI ENDOSCOPY,CTRL BLEED  12/6/2021         UPPER GI ENDOSCOPY,DIAGNOSIS  12/8/2021         VASCULAR SURGERY PROCEDURE UNLIST  11/14/2017    Insertion AV graft right upper arm (bovine); ligation of AV fistula right arm       Family History:  Family History   Problem Relation Age of Onset    COPD Mother     Lung Disease Mother     Cancer Father         stomach    Cancer Maternal Grandmother        Social History:  Social History     Tobacco Use    Smoking status: Never Smoker    Smokeless tobacco: Never Used   Substance Use Topics    Alcohol use: No    Drug use: Yes     Types: Prescription, OTC       Allergies: Allergies   Allergen Reactions    Shellfish Containing Products Swelling     Break out in rash, trouble breathing    Contrast Agent [Iodine] Shortness of Breath    Heparin Analogues Other (comments)     Positive history of HIT         Review of Systems   Review of Systems   Constitutional: Negative for fever. HENT: Negative for congestion. Eyes: Negative. Respiratory: Negative for shortness of breath. Cardiovascular: Negative for chest pain. Gastrointestinal: Positive for abdominal pain, nausea and vomiting. Endocrine: Negative for heat intolerance.    Genitourinary: Negative. Musculoskeletal: Negative for back pain. Skin: Negative for rash. Allergic/Immunologic: Negative for immunocompromised state. Neurological: Negative for dizziness. Hematological: Does not bruise/bleed easily. Psychiatric/Behavioral: Negative. All other systems reviewed and are negative. Physical Exam   Physical Exam  Vitals and nursing note reviewed. Constitutional:       General: He is not in acute distress. Appearance: He is well-developed. HENT:      Head: Normocephalic and atraumatic. Cardiovascular:      Rate and Rhythm: Normal rate and regular rhythm. Heart sounds: Normal heart sounds. Pulmonary:      Effort: Pulmonary effort is normal.      Breath sounds: Normal breath sounds. Abdominal:      General: Bowel sounds are normal.      Palpations: Abdomen is soft. Tenderness: There is generalized abdominal tenderness. Musculoskeletal:      Cervical back: Normal range of motion. Skin:     General: Skin is warm and dry. Neurological:      General: No focal deficit present. Mental Status: He is alert and oriented to person, place, and time.       Coordination: Coordination normal.   Psychiatric:         Mood and Affect: Mood normal.         Behavior: Behavior normal.         Diagnostic Study Results     Labs -     Recent Results (from the past 12 hour(s))   METABOLIC PANEL, COMPREHENSIVE    Collection Time: 02/23/22  5:37 PM   Result Value Ref Range    Sodium 132 (L) 136 - 145 mmol/L    Potassium 5.1 3.5 - 5.1 mmol/L    Chloride 95 (L) 97 - 108 mmol/L    CO2 30 21 - 32 mmol/L    Anion gap 7 5 - 15 mmol/L    Glucose 78 65 - 100 mg/dL    BUN 51 (H) 6 - 20 MG/DL    Creatinine 9.27 (H) 0.70 - 1.30 MG/DL    BUN/Creatinine ratio 6 (L) 12 - 20      GFR est AA 8 (L) >60 ml/min/1.73m2    GFR est non-AA 6 (L) >60 ml/min/1.73m2    Calcium 6.5 (L) 8.5 - 10.1 MG/DL    Bilirubin, total 0.4 0.2 - 1.0 MG/DL    ALT (SGPT) 12 12 - 78 U/L    AST (SGOT) 20 15 - 37 U/L Alk. phosphatase 299 (H) 45 - 117 U/L    Protein, total 8.1 6.4 - 8.2 g/dL    Albumin 2.4 (L) 3.5 - 5.0 g/dL    Globulin 5.7 (H) 2.0 - 4.0 g/dL    A-G Ratio 0.4 (L) 1.1 - 2.2     LIPASE    Collection Time: 02/23/22  5:37 PM   Result Value Ref Range    Lipase 61 (L) 73 - 393 U/L   CBC WITH AUTOMATED DIFF    Collection Time: 02/23/22  5:37 PM   Result Value Ref Range    WBC 8.5 4.1 - 11.1 K/uL    RBC 3.39 (L) 4.10 - 5.70 M/uL    HGB 9.7 (L) 12.1 - 17.0 g/dL    HCT 31.3 (L) 36.6 - 50.3 %    MCV 92.3 80.0 - 99.0 FL    MCH 28.6 26.0 - 34.0 PG    MCHC 31.0 30.0 - 36.5 g/dL    RDW 18.6 (H) 11.5 - 14.5 %    PLATELET 622 081 - 768 K/uL    MPV 10.7 8.9 - 12.9 FL    NRBC 0.0 0  WBC    ABSOLUTE NRBC 0.00 0.00 - 0.01 K/uL    NEUTROPHILS 60 32 - 75 %    LYMPHOCYTES 24 12 - 49 %    MONOCYTES 10 5 - 13 %    EOSINOPHILS 4 0 - 7 %    BASOPHILS 2 (H) 0 - 1 %    IMMATURE GRANULOCYTES 0 0.0 - 0.5 %    ABS. NEUTROPHILS 5.1 1.8 - 8.0 K/UL    ABS. LYMPHOCYTES 2.0 0.8 - 3.5 K/UL    ABS. MONOCYTES 0.9 0.0 - 1.0 K/UL    ABS. EOSINOPHILS 0.3 0.0 - 0.4 K/UL    ABS. BASOPHILS 0.1 0.0 - 0.1 K/UL    ABS. IMM. GRANS. 0.0 0.00 - 0.04 K/UL    DF AUTOMATED         Radiologic Studies -   CT ABD PELV WO CONT   Final Result      1. No acute abdominal or pelvic abnormality is confirmed, no significant change   since prior study. 2. Bilateral pleural effusions with bibasilar atelectasis. 3. Generalized subcutaneous and mesenteric edema. 4. Atrophic native kidneys and calcified transplant kidneys. 5. CT stigmata of renal osteodystrophy and other incidental findings described   above. CT Results  (Last 48 hours)               02/23/22 2004  CT ABD PELV WO CONT Final result    Impression:      1. No acute abdominal or pelvic abnormality is confirmed, no significant change   since prior study. 2. Bilateral pleural effusions with bibasilar atelectasis. 3. Generalized subcutaneous and mesenteric edema.    4. Atrophic native kidneys and calcified transplant kidneys. 5. CT stigmata of renal osteodystrophy and other incidental findings described   above. Narrative:  EXAM: CT ABD PELV WO CONT       INDICATION: Left lower quadrant pain . History of and stage renal disease with   transplant. Chronic dialysis. Multiple medical problems, including deep venous   thrombosis. COMPARISON: 2/12/2022       IV CONTRAST: None. ORAL CONTRAST: None       TECHNIQUE:    Thin axial images were obtained through the abdomen and pelvis. Coronal and   sagittal reformats were generated. CT dose reduction was achieved through use of   a standardized protocol tailored for this examination and automatic exposure   control for dose modulation. The absence of intravenous contrast material reduces the sensitivity for   evaluation of the vasculature and solid organs. FINDINGS:    LOWER THORAX: Bilateral pleural effusions and bibasilar infiltrate or   atelectasis, stable. Cardiomegaly. LIVER: No focal liver lesion. Upper normal size. BILIARY TREE: The gallbladder is surgically absent. CBD is not dilated. SPLEEN: within normal limits. PANCREAS: No focal abnormality. ADRENALS: Unremarkable. KIDNEYS/URETERS: The native kidneys are atrophic. Coarse calcification in the   pelvis bilaterally consistent with a calcified transplant kidneys. STOMACH: Unremarkable. SMALL BOWEL: No dilatation or wall thickening. COLON: No dilatation or wall thickening. Moderately heavy retained fecal   material but stable to slightly decreased since the prior study. APPENDIX: Not seen. PERITONEUM: No pneumoperitoneum. Stable mesenteric mass with peripheral   calcification. Stable mesenteric stranding throughout the abdomen. Small   ascites. RETROPERITONEUM: No lymphadenopathy or aortic aneurysm. Heavy vascular   calcification. Stable IVC filter. REPRODUCTIVE ORGANS: Unremarkable for age.    URINARY BLADDER: Nondistended and not well visualized. BONES: No destructive bone lesion. Bony stigmata of chronic renal disease. ABDOMINAL WALL: Small ventral hernia without incarceration of bowel. Stable   generalized subcutaneous edema. ADDITIONAL COMMENTS: N/A               CXR Results  (Last 48 hours)    None          Medical Decision Making   I am the first provider for this patient. I reviewed the vital signs, available nursing notes, past medical history, past surgical history, family history and social history. Vital Signs-Reviewed the patient's vital signs. Patient Vitals for the past 12 hrs:   Temp Pulse Resp BP SpO2   02/23/22 1733 97.9 °F (36.6 °C) 86 16 132/87 100 %           Records Reviewed: Nursing Notes, Old Medical Records, Previous Radiology Studies and Previous Laboratory Studies    Provider Notes (Medical Decision Making):   Diverticulitis, kidney stone, obstruction, dehydration, electrolyte abnormality    ED Course:   Initial assessment performed. The patients presenting problems have been discussed, and they are in agreement with the care plan formulated and outlined with them. I have encouraged them to ask questions as they arise throughout their visit. Consult note:    I spoke to Dr. Meredith Thayer, nephrology, regarding the change in the patient's creatinine. She says that as long as   the patient's patient's potassium, bicarb, and vital signs stable, he can just get dialyzed tomorrow. Critical Care Time:   0    Disposition:  home    DISCHARGE PLAN:  1. Discharge Medication List as of 2/23/2022  9:42 PM        2. Follow-up Information     Follow up With Specialties Details Why Contact Info    Alison Muir MD Family Medicine  As needed [place]  [phone]      [facility] EMERGENCY DEPT Emergency Medicine  If symptoms worsen [address]  [address]   [phone] [id number] [phone]        3. Return to ED if worse     Diagnosis     Clinical Impression:   1.  Acute abdominal pain 2. Bilateral pleural effusion    3. ESRD (end stage renal disease) ([facility] [address]        Attestations:    Alexa Holloway MD    Please note that this dictation was completed with [employer], the computer voice recognition software. Quite often unanticipated grammatical, syntax, homophones, and other interpretive errors are inadvertently transcribed by the computer software. Please disregard these errors. Please excuse any errors that have escaped final proofreading. Thank you.

## 2022-02-28 PROCEDURE — 74011000636 HC RX REV CODE- 636: Performed by: STUDENT IN AN ORGANIZED HEALTH CARE EDUCATION/TRAINING PROGRAM

## 2022-03-02 NOTE — PROGRESS NOTES
[facility]:     *Home-no needs   *sister to transport at d/c    Patient is expected to d/c home after MRI is complete. CM will continue to follow.     Maria Eugenia Cruz  Ext [phone] High Dose Vitamin A Counseling: Side effects reviewed, pt to contact office should one occur.

## 2022-03-18 PROBLEM — D72.829 LEUCOCYTOSIS: Status: ACTIVE | Noted: 2021-03-29

## 2022-03-18 PROBLEM — U07.1 ACUTE COVID-19: Status: ACTIVE | Noted: 2021-11-27

## 2022-03-19 PROBLEM — R94.31 ABNORMAL EKG: Status: ACTIVE | Noted: 2020-09-10

## 2022-03-19 PROBLEM — R78.81 GRAM-POSITIVE BACTEREMIA: Status: ACTIVE | Noted: 2021-03-29

## 2022-03-19 PROBLEM — R50.9 FEVER: Status: ACTIVE | Noted: 2021-11-05

## 2022-03-19 PROBLEM — K92.2 GIB (GASTROINTESTINAL BLEEDING): Status: ACTIVE | Noted: 2021-12-26

## 2022-03-19 PROBLEM — J18.9 PNEUMONIA: Status: ACTIVE | Noted: 2019-01-16

## 2022-03-19 PROBLEM — A41.9 SEPSIS (HCC): Status: ACTIVE | Noted: 2018-11-17

## 2022-03-19 PROBLEM — N18.6 ESRD (END STAGE RENAL DISEASE) (HCC): Status: ACTIVE | Noted: 2020-09-10

## 2022-04-05 ENCOUNTER — APPOINTMENT (OUTPATIENT)
Dept: CT IMAGING | Age: 47
End: 2022-04-05
Attending: EMERGENCY MEDICINE
Payer: MEDICARE

## 2022-04-05 ENCOUNTER — HOSPITAL ENCOUNTER (EMERGENCY)
Age: 47
Discharge: HOME | End: 2022-04-06
Attending: EMERGENCY MEDICINE
Payer: MEDICARE

## 2022-04-05 VITALS
WEIGHT: 132.94 LBS | RESPIRATION RATE: 18 BRPM | OXYGEN SATURATION: 100 % | SYSTOLIC BLOOD PRESSURE: 111 MMHG | HEART RATE: 103 BPM | BODY MASS INDEX: 20.87 KG/M2 | HEIGHT: 67 IN | DIASTOLIC BLOOD PRESSURE: 58 MMHG | TEMPERATURE: 99.1 F

## 2022-04-05 DIAGNOSIS — R11.2 NAUSEA AND VOMITING, UNSPECIFIED VOMITING TYPE: ICD-10-CM

## 2022-04-05 DIAGNOSIS — R10.84 ABDOMINAL PAIN, GENERALIZED: Primary | ICD-10-CM

## 2022-04-05 LAB
ALBUMIN SERPL-MCNC: 3.3 G/DL (ref 3.5–5)
ALBUMIN/GLOB SERPL: 0.6 {RATIO} (ref 1.1–2.2)
ALP SERPL-CCNC: 210 U/L (ref 45–117)
ALT SERPL-CCNC: 15 U/L (ref 12–78)
ANION GAP SERPL CALC-SCNC: 8 MMOL/L (ref 5–15)
AST SERPL-CCNC: 17 U/L (ref 15–37)
BASOPHILS # BLD: 0.1 K/UL (ref 0–0.1)
BASOPHILS NFR BLD: 1 % (ref 0–1)
BILIRUB SERPL-MCNC: 0.5 MG/DL (ref 0.2–1)
BUN SERPL-MCNC: 33 MG/DL (ref 6–20)
BUN/CREAT SERPL: 6 (ref 12–20)
CALCIUM SERPL-MCNC: 9.2 MG/DL (ref 8.5–10.1)
CHLORIDE SERPL-SCNC: 100 MMOL/L (ref 97–108)
CO2 SERPL-SCNC: 27 MMOL/L (ref 21–32)
CREAT SERPL-MCNC: 6 MG/DL (ref 0.7–1.3)
DIFFERENTIAL METHOD BLD: ABNORMAL
EOSINOPHIL # BLD: 0.3 K/UL (ref 0–0.4)
EOSINOPHIL NFR BLD: 4 % (ref 0–7)
ERYTHROCYTE [DISTWIDTH] IN BLOOD BY AUTOMATED COUNT: 17.1 % (ref 11.5–14.5)
GLOBULIN SER CALC-MCNC: 5.3 G/DL (ref 2–4)
GLUCOSE SERPL-MCNC: 90 MG/DL (ref 65–100)
HCT VFR BLD AUTO: 30.3 % (ref 36.6–50.3)
HGB BLD-MCNC: 10.2 G/DL (ref 12.1–17)
IMM GRANULOCYTES # BLD AUTO: 0 K/UL (ref 0–0.04)
IMM GRANULOCYTES NFR BLD AUTO: 0 % (ref 0–0.5)
LACTATE BLD-SCNC: 1.61 MMOL/L (ref 0.4–2)
LIPASE SERPL-CCNC: 390 U/L (ref 73–393)
LYMPHOCYTES # BLD: 1.3 K/UL (ref 0.8–3.5)
LYMPHOCYTES NFR BLD: 20 % (ref 12–49)
MAGNESIUM SERPL-MCNC: 2.1 MG/DL (ref 1.6–2.4)
MCH RBC QN AUTO: 29.3 PG (ref 26–34)
MCHC RBC AUTO-ENTMCNC: 33.7 G/DL (ref 30–36.5)
MCV RBC AUTO: 87.1 FL (ref 80–99)
MONOCYTES # BLD: 0.8 K/UL (ref 0–1)
MONOCYTES NFR BLD: 12 % (ref 5–13)
NEUTS SEG # BLD: 4.1 K/UL (ref 1.8–8)
NEUTS SEG NFR BLD: 63 % (ref 32–75)
NRBC # BLD: 0 K/UL (ref 0–0.01)
NRBC BLD-RTO: 0 PER 100 WBC
PLATELET # BLD AUTO: 208 K/UL (ref 150–400)
PMV BLD AUTO: 9.9 FL (ref 8.9–12.9)
POTASSIUM SERPL-SCNC: 3 MMOL/L (ref 3.5–5.1)
PROT SERPL-MCNC: 8.6 G/DL (ref 6.4–8.2)
RBC # BLD AUTO: 3.48 M/UL (ref 4.1–5.7)
SODIUM SERPL-SCNC: 135 MMOL/L (ref 136–145)
WBC # BLD AUTO: 6.4 K/UL (ref 4.1–11.1)

## 2022-04-05 PROCEDURE — 80053 COMPREHEN METABOLIC PANEL: CPT

## 2022-04-05 PROCEDURE — 96375 TX/PRO/DX INJ NEW DRUG ADDON: CPT

## 2022-04-05 PROCEDURE — 83690 ASSAY OF LIPASE: CPT

## 2022-04-05 PROCEDURE — 83605 ASSAY OF LACTIC ACID: CPT

## 2022-04-05 PROCEDURE — 74176 CT ABD & PELVIS W/O CONTRAST: CPT

## 2022-04-05 PROCEDURE — 96374 THER/PROPH/DIAG INJ IV PUSH: CPT

## 2022-04-05 PROCEDURE — 74011250636 HC RX REV CODE- 250/636: Performed by: EMERGENCY MEDICINE

## 2022-04-05 PROCEDURE — 85025 COMPLETE CBC W/AUTO DIFF WBC: CPT

## 2022-04-05 PROCEDURE — 83735 ASSAY OF MAGNESIUM: CPT

## 2022-04-05 PROCEDURE — 36415 COLL VENOUS BLD VENIPUNCTURE: CPT

## 2022-04-05 RX ORDER — ONDANSETRON 2 MG/ML
4 INJECTION INTRAMUSCULAR; INTRAVENOUS
Status: COMPLETED | OUTPATIENT
Start: 2022-04-05 | End: 2022-04-05

## 2022-04-05 RX ORDER — FENTANYL CITRATE 50 UG/ML
100 INJECTION, SOLUTION INTRAMUSCULAR; INTRAVENOUS
Status: COMPLETED | OUTPATIENT
Start: 2022-04-05 | End: 2022-04-06

## 2022-04-05 RX ORDER — MORPHINE SULFATE 2 MG/ML
4 INJECTION, SOLUTION INTRAMUSCULAR; INTRAVENOUS
Status: COMPLETED | OUTPATIENT
Start: 2022-04-05 | End: 2022-04-05

## 2022-04-05 RX ADMIN — SODIUM CHLORIDE 500 ML: 9 INJECTION, SOLUTION INTRAVENOUS at 22:50

## 2022-04-05 RX ADMIN — MORPHINE SULFATE 4 MG: 2 INJECTION, SOLUTION INTRAMUSCULAR; INTRAVENOUS at 22:51

## 2022-04-05 RX ADMIN — ONDANSETRON 4 MG: 2 INJECTION INTRAMUSCULAR; INTRAVENOUS at 22:51

## 2022-04-06 PROCEDURE — 99284 EMERGENCY DEPT VISIT MOD MDM: CPT

## 2022-04-06 PROCEDURE — 96375 TX/PRO/DX INJ NEW DRUG ADDON: CPT

## 2022-04-06 PROCEDURE — 74011250636 HC RX REV CODE- 250/636: Performed by: EMERGENCY MEDICINE

## 2022-04-06 RX ORDER — ONDANSETRON 4 MG/1
4 TABLET, FILM COATED ORAL
Qty: 20 TABLET | Refills: 0 | Status: SHIPPED | OUTPATIENT
Start: 2022-04-06

## 2022-04-06 RX ORDER — DICYCLOMINE HYDROCHLORIDE 20 MG/1
20 TABLET ORAL
Qty: 20 TABLET | Refills: 0 | Status: SHIPPED | OUTPATIENT
Start: 2022-04-06

## 2022-04-06 RX ADMIN — FENTANYL CITRATE 100 MCG: 50 INJECTION, SOLUTION INTRAMUSCULAR; INTRAVENOUS at 00:07

## 2022-04-06 NOTE — ED PROVIDER NOTES
EMERGENCY DEPARTMENT HISTORY AND PHYSICAL EXAM      Date: 4/5/2022  Patient Name: Octavio Cavazos    History of Presenting Illness     Chief Complaint   Patient presents with    Vomiting     Reports new onset of N/V and stomach cramps x 6 hours. Denied diarrhea. Hemodialysis pt. History Provided By: Patient    HPI: Octavio Cavazos, 42 y.o. male with PMHx as noted below presents the emergency department with chief complaint of abdominal pain, nausea vomiting. Patient states that this afternoon had onset of abdominal cramping, nausea vomiting. Pain is constant, moderate intensity and nonradiating. Pain is diffuse in his abdomen. Patient reports that he has been compliant with his home dialysis. Patient has had several similar episodes in the past of unknown cause. Pt denies any other alleviating or exacerbating factors. Additionally, pt specifically denies any recent fever, chills, headache, CP, SOB, lightheadedness, dizziness, numbness, weakness, BLE swelling, heart palpitations, urinary sxs, diarrhea, constipation, melena, hematochezia, cough, or congestion. PCP: Woody Saucedo MD    Current Outpatient Medications   Medication Sig Dispense Refill    ondansetron hcl (Zofran) 4 mg tablet Take 1 Tablet by mouth every twelve (12) hours as needed for Nausea or Vomiting. 20 Tablet 0    dicyclomine (BENTYL) 20 mg tablet Take 1 Tablet by mouth every six (6) hours as needed for Abdominal Cramps. 20 Tablet 0    metoprolol succinate (TOPROL-XL) 25 mg XL tablet Take 12.5 mg by mouth daily.  pantoprazole (PROTONIX) 40 mg tablet Take 1 Tablet by mouth Before breakfast and dinner. 60 Tablet 2    sucralfate (CARAFATE) 1 gram tablet Take 1 Tablet by mouth Before breakfast, lunch, dinner and at bedtime. 120 Tablet 2    polyethylene glycol (MIRALAX) 17 gram packet Take 1 Packet by mouth daily. 30 Packet 0    propranoloL (INDERAL) 10 mg tablet Take 10 mg by mouth two (2) times a day.       Vitamin D2 1,250 mcg (50,000 unit) capsule TAKE 1 CAPSULE BY MOUTH THREE  DAYS (TIMES) A WEEK      calcium acetate,phosphat bind, (PHOSLO) 667 mg cap Take 2 Caps by mouth three (3) times daily (with meals). Indications: low amount of calcium in the blood, renal osteodystrophy with hyperphosphatemia 120 Cap 0    atorvastatin (Lipitor) 20 mg tablet Take 20 mg by mouth daily.  acetaminophen (TYLENOL) 500 mg tablet Take 1 Tab by mouth every four (4) hours as needed for Pain. Over the counter 30 Tab 0    calcitRIOL (ROCALTROL) 0.5 mcg capsule Take 0.5 mcg by mouth daily. Past History     Past Medical History:  Past Medical History:   Diagnosis Date    Abdominal hematoma     AICD (automatic cardioverter/defibrillator) present     CAD (coronary artery disease)     anterior MI s/p 2 stents  2007    Chronic abdominal pain     Chronic kidney disease     ESRD; Dialysis dependent.  Home [facility] does labs- [facility] tpke    DVT (deep venous thrombosis) ([facility] [address] 2001    DVT of popliteal vein ([address] 11/2011    not anticoagulated due to bleeding, IVC filter    Endocarditis     Gallstone pancreatitis     Gastrointestinal disorder     acid reflux    Gastrointestinal disorder     peptic ulcer    Hemodialysis patient ([address]     High cholesterol     Hypertension     Kidney transplant     b/l kidneys 1995, 2001    Long term current use of anticoagulant therapy     Nephrotic syndrome     Other ill-defined conditions(799.89)     kidny transplant x2,  on dialysis    Other ill-defined conditions(799.89)     home dialysis    Other ill-defined conditions(799.89)     hx recurrent left leg DVT    Peritonitis ([address]     Seizures ([address] 2015    most recent- only had three in lifetime    Small bowel obstruction (HCC)     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    V-tach [facility])        Past Surgical History:  Past Surgical History:   Procedure Laterality Date    HX APPENDECTOMY      HX CHOLECYSTECTOMY      HX OTHER SURGICAL  11/2011    exploratory laparotomy    HX OTHER SURGICAL      fem-pop    HX OTHER SURGICAL  02/2013    right upper extremity fistula    HX PACEMAKER Left 6/2009    AICD-st latonya-not connected    HX PACEMAKER Left     AICD-left side-BS-working    HX SMALL BOWEL RESECTION      HX TRANSPLANT  2001     Kidney    HX TRANSPLANT  1992    kidney    HX VASCULAR ACCESS Right     femoral access for HD- does 5 day dialysis @ home    IR INSERT NON TUNL CVC OVER 5 YRS  12/7/2021    IR INSERT NON TUNL CVC OVER 5 YRS  12/10/2021    IR INSERT TIPS HEPATIC SHUNT  12/30/2021    IR REMOVE TUNL CVAD W/O PORT / PUMP  10/29/2020    IR REPLACE CVC TUNNELED W/O PORT  9/10/2020    MS CARDIAC SURG PROCEDURE UNLIST  2007    2 stents    MS EDG US EXAM SURGICAL ALTER STOM DUODENUM/JEJUNUM  7/15/2011         MS ESOPHAGOGASTRODUODENOSCOPY TRANSORAL DIAGNOSTIC  5/24/2012         UPPER GI ENDOSCOPY,CTRL BLEED  12/6/2021         UPPER GI ENDOSCOPY,DIAGNOSIS  12/8/2021         VASCULAR SURGERY PROCEDURE UNLIST  11/14/2017    Insertion AV graft right upper arm (bovine); ligation of AV fistula right arm       Family History:  Family History   Problem Relation Age of Onset    COPD Mother     Lung Disease Mother     Cancer Father         stomach    Cancer Maternal Grandmother        Social History:  Social History     Tobacco Use    Smoking status: Never Smoker    Smokeless tobacco: Never Used   Substance Use Topics    Alcohol use: No    Drug use: Yes     Types: Prescription, OTC       Allergies: Allergies   Allergen Reactions    Shellfish Containing Products Swelling     Break out in rash, trouble breathing    Contrast Agent [Iodine] Shortness of Breath    Heparin Analogues Other (comments)     Positive history of HIT         Review of Systems   Review of Systems  Constitutional: Negative for fever, chills, and fatigue.    HENT: Negative for congestion, sore throat, rhinorrhea, sneezing and neck stiffness Eyes: Negative for discharge and redness. Respiratory: Negative for  shortness of breath, wheezing   Cardiovascular: Negative for chest pain, palpitations   Gastrointestinal: Positive for nausea, vomiting, abdominal pain. Negative constipation, diarrhea and blood in stool. Genitourinary: Negative for dysuria, hematuria, flank pain, decreased urine volume, discharge,   Musculoskeletal: Negative for myalgias or joint pain . Skin: Negative for rash or lesions . Neurological: Negative weakness, light-headedness, numbness and headaches. Physical Exam   Physical Exam    GENERAL: alert and oriented, no acute distress  EYES: PEERL, No injection, discharge or icterus. ENT: Mucous membranes pink and moist.  NECK: Supple  LUNGS: Airway patent. Non-labored respirations. Breath sounds clear with good air entry bilaterally. HEART: Regular rate and rhythm. No peripheral edema  ABDOMEN: Non-distended, diffusely tender, without guarding or rebound. SKIN:  warm, dry  MSK/EXTREMITIES: Without swelling, tenderness or deformity, symmetric with normal ROM  NEUROLOGICAL: Alert, oriented      Diagnostic Study Results     Labs -   No results found for this or any previous visit (from the past 12 hour(s)). Radiologic Studies -   CT ABD PELV WO CONT   Final Result   1. No CT explanation for the patient's abdominal pain. 2. Incidental findings as above            CT Results  (Last 48 hours)               04/05/22 2331  CT ABD PELV WO CONT Final result    Impression:  1. No CT explanation for the patient's abdominal pain. 2. Incidental findings as above           Narrative:  EXAM:  CT ABDOMEN PELVIS WITHOUT CONTRAST   INDICATION:  abd pain. Additional history:   COMPARISON: CT of the abdomen and pelvis, 2/12/2022 and 2/23/2022. .   TECHNIQUE:    Unenhanced multislice helical CT was performed from the diaphragm to the   symphysis pubis without intravenous contrast administration.  Contiguous 5 mm   axial images were reconstructed and lung and soft tissue windows were generated. Coronal and sagittal reformations were generated. CT dose reduction was achieved through use of a standardized protocol tailored   for this examination and automatic exposure control for dose modulation. Catrachita Maid FINDINGS:   INCIDENTALLY IMAGED CHEST:   Heart/vessels: Calcifications in the coronary arteries. Calcification in the   lumen of the left ventricle, likely a papillary muscle. Cardiomegaly. A cardiac   assist device in the left, lateral chest is a lead overlying the sternum. Lungs/Pleura: Right-sided pleural effusion. Right basilar atelectasis and/or   consolidation. .   ABDOMEN:   Liver: Within normal limits. Gallbladder/Biliary: Within normal limits. Spleen: Within normal limits. Pancreas: Within normal limits. Adrenals: Within normal limits. Kidneys: Bilateral renal atrophy. Peritoneum/Mesenteries: Rim calcified lesion in the central mesentery. Numerous   surgical clips at the inferior margin of the liver. Extraperitoneum: Coarse calcifications in the retroperitoneum of the lower   abdomen. Gastrointestinal tract: Within normal limits. Vascular: Infrarenal IVC filter. Calcification within the IVC, proximal to the   IVC filter, possibly representing mature clot vascular calcifications. Catrachita Maid PELVIS:   Extraperitoneum: Within normal limits. Ureters: Within normal limits. Bladder:    Entirely decompressed and unremarkable. Reproductive System: Within normal limits. .   MSK:    Diffusely increased mineralization throughout the spine suggesting renal   osteodystrophy. .           CXR Results  (Last 48 hours)    None            Medical Decision Making     Dylan BARON MD am the first provider for this patient and am the attending of record for this patient encounter.     I reviewed the vital signs, available nursing notes, past medical history, past surgical history, family history and social history. Vital Signs-Reviewed the patient's vital signs. Records Reviewed: Nursing Notes and Old Medical Records    Provider Notes (Medical Decision Making): The patient presents with a chief complaint of abdominal pain. Differential diagnosis is broad and includes acute appendicitis, acute cholecystitis, pancreatitis, gastritis, PUD, diverticulitis, mesenteric ischemia, abdominal aortic aneurysm, aortic dissection, bowel obstruction, enteritis, colitis, fecal impaction, volvulus, IBS, inflammatory bowel disease, specific food intolerance, peritonitis, perforated viscous, malignancy, UTI, abscess, testicular torsion, epididymitis, orchitis, testicular torsion and abdominal pain NOS. Anthony Layne All labs and diagnostic studies were reviewed and interpreted by me. Labs were overall reassuring and nondiagnostic. CT imaging showed no acute pathology. Clinical examination, history, and work-up exclude some of the more serious diagnoses as listed above. Cause of the abdominal pain was not clearly identified. Patient was given the pain medication and antiemetics with improvement. On reevaluation pt is tolerating po. The patient states that their symptoms have improved and he feels much better. There are no other new complaints at this time      There were no concerns for any acute life-threatening or surgical pathology that would warrant admission at this time. Vital signs were within acceptable limits at the time of discharge. Patient was in stable condition and felt to be reliable for outpatient management. There were no lab findings of immediate concern. The patient was advised to return to the emergency room for acutely worsening pain, persistence of pain, fevers, bloody stools, intractable vomiting or bloody emesis, inability to tolerate food/liquids, syncope, or change in mental status. Otherwise, the patient is encouraged to follow-up with a primary care physician within the week if possible. .  The patient understood the work-up and assessment and had no further questions. The patient was discharged home in stable clinical condition. ED Course:   Initial assessment performed. The patients presenting problems have been discussed, and they are in agreement with the care plan formulated and outlined with them. I have encouraged them to ask questions as they arise throughout their visit. Medications   morphine injection 4 mg (4 mg IntraVENous Given 4/5/22 2251)   ondansetron (ZOFRAN) injection 4 mg (4 mg IntraVENous Given 4/5/22 2251)   sodium chloride 0.9 % bolus infusion 500 mL (0 mL IntraVENous IV Completed 4/5/22 2326)   fentaNYL citrate (PF) injection 100 mcg (100 mcg IntraVENous Given 4/6/22 0007)         PROGRESS  Meli Mota's  results have been reviewed with him. He has been counseled regarding his diagnosis. He verbally conveys understanding and agreement of the signs, symptoms, diagnosis, treatment and prognosis and additionally agrees to follow up as recommended with Dr. Norah Samuels MD in 24 - 48 hours. He also agrees with the care-plan and conveys that all of his questions have been answered. I have also put together some discharge instructions for him that include: 1) educational information regarding their diagnosis, 2) how to care for their diagnosis at home, as well a 3) list of reasons why they would want to return to the ED prior to their follow-up appointment, should their condition change. Disposition:  home    PLAN:  1.    Discharge Medication List as of 4/6/2022 12:25 AM      START taking these medications    Details   ondansetron hcl (Zofran) 4 mg tablet Take 1 Tablet by mouth every twelve (12) hours as needed for Nausea or Vomiting., Normal, Disp-20 Tablet, R-0         CONTINUE these medications which have NOT CHANGED    Details   dicyclomine (BENTYL) 20 mg tablet Take 1 Tablet by mouth every six (6) hours as needed for Abdominal Cramps., Normal, Disp-20 Tablet, R-0      metoprolol succinate (TOPROL-XL) 25 mg XL tablet Take 12.5 mg by mouth daily. , Historical Med      pantoprazole (PROTONIX) 40 mg tablet Take 1 Tablet by mouth Before breakfast and dinner., Normal, Disp-60 Tablet, R-2      sucralfate (CARAFATE) 1 gram tablet Take 1 Tablet by mouth Before breakfast, lunch, dinner and at bedtime., Normal, Disp-120 Tablet, R-2      polyethylene glycol (MIRALAX) 17 gram packet Take 1 Packet by mouth daily. , Normal, Disp-30 Packet, R-0      propranoloL (INDERAL) 10 mg tablet Take 10 mg by mouth two (2) times a day., Historical Med      Vitamin D2 1,250 mcg (50,000 unit) capsule TAKE 1 CAPSULE BY MOUTH THREE  DAYS (TIMES) A WEEK, Historical Med, KIMBERLY      calcium acetate,phosphat bind, (PHOSLO) 667 mg cap Take 2 Caps by mouth three (3) times daily (with meals). Indications: low amount of calcium in the blood, renal osteodystrophy with hyperphosphatemia, Normal, Disp-120 Cap,R-0      atorvastatin (Lipitor) 20 mg tablet Take 20 mg by mouth daily. , Historical Med      acetaminophen (TYLENOL) 500 mg tablet Take 1 Tab by mouth every four (4) hours as needed for Pain. Over the counter, No Print, Disp-30 Tab,R-0      calcitRIOL (ROCALTROL) 0.5 mcg capsule Take 0.5 mcg by mouth daily. , Historical Med           2. Follow-up Information     Follow up With Specialties Details Why Contact Info    Kinsey Pizano MD Family Medicine Schedule an appointment as soon as possible for a visit in 2 days  [address] [place] [address] [phone]      [facility] EMERGENCY DEPT Emergency Medicine  If symptoms worsen [address]  [address] [facility]  [phone]        Return to ED if worse     Diagnosis     Clinical Impression:   1. Abdominal pain, generalized    2. Nausea and vomiting, unspecified vomiting type        Please note that this dictation was completed with Dragon, computer voice recognition software.   Quite often unanticipated grammatical, syntax, homophones, and other interpretive errors are inadvertently transcribed by the computer software. Please disregard these errors. Additionally, please excuse any errors that have escaped final proofreading.

## 2022-04-18 RX ORDER — SEVELAMER CARBONATE 800 MG/1
1600 TABLET, FILM COATED ORAL 3 TIMES DAILY
COMMUNITY

## 2022-04-20 ENCOUNTER — ANESTHESIA EVENT (OUTPATIENT)
Dept: ENDOSCOPY | Age: 47
End: 2022-04-20
Payer: MEDICARE

## 2022-04-20 ENCOUNTER — HOSPITAL ENCOUNTER (OUTPATIENT)
Age: 47
Setting detail: OUTPATIENT SURGERY
Discharge: HOME | End: 2022-04-20
Attending: INTERNAL MEDICINE | Admitting: INTERNAL MEDICINE
Payer: MEDICARE

## 2022-04-20 ENCOUNTER — ANESTHESIA (OUTPATIENT)
Dept: ENDOSCOPY | Age: 47
End: 2022-04-20
Payer: MEDICARE

## 2022-04-20 VITALS
RESPIRATION RATE: 13 BRPM | TEMPERATURE: 98 F | HEIGHT: 67 IN | OXYGEN SATURATION: 95 % | HEART RATE: 87 BPM | WEIGHT: 140.9 LBS | DIASTOLIC BLOOD PRESSURE: 91 MMHG | BODY MASS INDEX: 22.11 KG/M2 | SYSTOLIC BLOOD PRESSURE: 140 MMHG

## 2022-04-20 LAB
ANION GAP BLD CALC-SCNC: 7 MMOL/L (ref 10–20)
CA-I BLD-MCNC: 1.14 MMOL/L (ref 1.12–1.32)
CHLORIDE BLD-SCNC: 102 MMOL/L (ref 98–107)
CO2 BLD-SCNC: 27.7 MMOL/L (ref 21–32)
GLUCOSE BLD-MCNC: 77 MG/DL (ref 65–100)
POTASSIUM BLD-SCNC: 5 MMOL/L (ref 3.5–5.1)
SERVICE CMNT-IMP: ABNORMAL
SODIUM BLD-SCNC: 136 MMOL/L (ref 136–145)

## 2022-04-20 PROCEDURE — 80047 BASIC METABLC PNL IONIZED CA: CPT

## 2022-04-20 PROCEDURE — 2709999900 HC NON-CHARGEABLE SUPPLY: Performed by: INTERNAL MEDICINE

## 2022-04-20 PROCEDURE — 74011000250 HC RX REV CODE- 250: Performed by: NURSE ANESTHETIST, CERTIFIED REGISTERED

## 2022-04-20 PROCEDURE — 74011250636 HC RX REV CODE- 250/636: Performed by: NURSE ANESTHETIST, CERTIFIED REGISTERED

## 2022-04-20 PROCEDURE — 76060000031 HC ANESTHESIA FIRST 0.5 HR: Performed by: INTERNAL MEDICINE

## 2022-04-20 PROCEDURE — 76040000019: Performed by: INTERNAL MEDICINE

## 2022-04-20 PROCEDURE — 74011250636 HC RX REV CODE- 250/636: Performed by: INTERNAL MEDICINE

## 2022-04-20 RX ORDER — NALOXONE HYDROCHLORIDE 0.4 MG/ML
0.4 INJECTION, SOLUTION INTRAMUSCULAR; INTRAVENOUS; SUBCUTANEOUS
Status: DISCONTINUED | OUTPATIENT
Start: 2022-04-20 | End: 2022-04-20 | Stop reason: HOSPADM

## 2022-04-20 RX ORDER — PHENYLEPHRINE HCL IN 0.9% NACL 0.4MG/10ML
SYRINGE (ML) INTRAVENOUS AS NEEDED
Status: DISCONTINUED | OUTPATIENT
Start: 2022-04-20 | End: 2022-04-20 | Stop reason: HOSPADM

## 2022-04-20 RX ORDER — ATROPINE SULFATE 0.1 MG/ML
0.5 INJECTION INTRAVENOUS
Status: DISCONTINUED | OUTPATIENT
Start: 2022-04-20 | End: 2022-04-20 | Stop reason: HOSPADM

## 2022-04-20 RX ORDER — SODIUM CHLORIDE 0.9 % (FLUSH) 0.9 %
5-40 SYRINGE (ML) INJECTION AS NEEDED
Status: DISCONTINUED | OUTPATIENT
Start: 2022-04-20 | End: 2022-04-20 | Stop reason: HOSPADM

## 2022-04-20 RX ORDER — LIDOCAINE HYDROCHLORIDE 20 MG/ML
INJECTION, SOLUTION EPIDURAL; INFILTRATION; INTRACAUDAL; PERINEURAL AS NEEDED
Status: DISCONTINUED | OUTPATIENT
Start: 2022-04-20 | End: 2022-04-20 | Stop reason: HOSPADM

## 2022-04-20 RX ORDER — FLUMAZENIL 0.1 MG/ML
0.2 INJECTION INTRAVENOUS
Status: DISCONTINUED | OUTPATIENT
Start: 2022-04-20 | End: 2022-04-20 | Stop reason: HOSPADM

## 2022-04-20 RX ORDER — PROPOFOL 10 MG/ML
INJECTION, EMULSION INTRAVENOUS AS NEEDED
Status: DISCONTINUED | OUTPATIENT
Start: 2022-04-20 | End: 2022-04-20 | Stop reason: HOSPADM

## 2022-04-20 RX ORDER — SODIUM CHLORIDE 0.9 % (FLUSH) 0.9 %
5-40 SYRINGE (ML) INJECTION EVERY 8 HOURS
Status: DISCONTINUED | OUTPATIENT
Start: 2022-04-20 | End: 2022-04-20 | Stop reason: HOSPADM

## 2022-04-20 RX ORDER — SODIUM CHLORIDE 9 MG/ML
100 INJECTION, SOLUTION INTRAVENOUS CONTINUOUS
Status: DISCONTINUED | OUTPATIENT
Start: 2022-04-20 | End: 2022-04-20 | Stop reason: HOSPADM

## 2022-04-20 RX ORDER — DEXTROMETHORPHAN/PSEUDOEPHED 2.5-7.5/.8
1.2 DROPS ORAL
Status: DISCONTINUED | OUTPATIENT
Start: 2022-04-20 | End: 2022-04-20 | Stop reason: HOSPADM

## 2022-04-20 RX ADMIN — PROPOFOL 20 MG: 10 INJECTION, EMULSION INTRAVENOUS at 12:22

## 2022-04-20 RX ADMIN — Medication 80 MCG: at 12:33

## 2022-04-20 RX ADMIN — PROPOFOL 30 MG: 10 INJECTION, EMULSION INTRAVENOUS at 12:19

## 2022-04-20 RX ADMIN — SODIUM CHLORIDE 100 ML/HR: 9 INJECTION, SOLUTION INTRAVENOUS at 11:51

## 2022-04-20 RX ADMIN — PROPOFOL 20 MG: 10 INJECTION, EMULSION INTRAVENOUS at 12:24

## 2022-04-20 RX ADMIN — Medication 40 MCG: at 12:29

## 2022-04-20 RX ADMIN — LIDOCAINE HYDROCHLORIDE 40 MG: 20 INJECTION, SOLUTION EPIDURAL; INFILTRATION; INTRACAUDAL; PERINEURAL at 12:19

## 2022-04-20 NOTE — ANESTHESIA POSTPROCEDURE EVALUATION
Procedure(s):  ESOPHAGOGASTRODUODENOSCOPY (EGD) [facility] BIOPSY. MAC    Anesthesia Post Evaluation      Multimodal analgesia: multimodal analgesia used between 6 hours prior to anesthesia start to PACU discharge  Patient location during evaluation: PACU  Patient participation: complete - patient participated  Level of consciousness: sleepy but conscious and responsive to verbal stimuli  Pain score: 2  Pain management: adequate  Airway patency: patent  Anesthetic complications: no  Cardiovascular status: acceptable  Respiratory status: acceptable  Hydration status: acceptable  Comments: +Post-Anesthesia Evaluation and Assessment    Patient: Christian Pennington MRN: [id number]  SSN: [id number]   YOB: 1975  Age: 42 y.o. Sex: male      Cardiovascular Function/Vital Signs    BP (!) 140/85   Pulse 93   Temp 36.7 °C (98 °F)   Resp 15   Ht 5' 7\" (1.702 m)   Wt 63.9 kg (140 lb 14.4 oz)   SpO2 100%   BMI 22.07 kg/m²     Patient is status post Procedure(s):  ESOPHAGOGASTRODUODENOSCOPY (EGD) [facility] BIOPSY. Nausea/Vomiting: Controlled. Postoperative hydration reviewed and adequate. Pain:  Pain Scale 1: Numeric (0 - 10) (04/20/22 1248)  Pain Intensity 1: 0 (04/20/22 1248)   Managed. Neurological Status: At baseline. Mental Status and Level of Consciousness: Arousable. Pulmonary Status:   O2 Device: None (Room air) (04/20/22 1248)   Adequate oxygenation and airway patent. Complications related to anesthesia: None    Post-anesthesia assessment completed. No concerns. Signed By: Shoshana Munguia MD    4/20/2022  Post anesthesia nausea and vomiting:  controlled  Final Post Anesthesia Temperature Assessment:  Normothermia (36.0-37.5 degrees C)      INITIAL Post-op Vital signs:   Vitals Value Taken Time   /91 04/20/22 1254   Temp     Pulse 89 04/20/22 1254   Resp 15 04/20/22 1254   SpO2 96 % 04/20/22 1254   Vitals shown include unvalidated device data.

## 2022-04-20 NOTE — ROUTINE PROCESS
Mendez Landry  1975  [id number]    Situation:  Verbal report received from: Alvina  Procedure: Procedure(s):  ESOPHAGOGASTRODUODENOSCOPY (EGD) [facility] BIOPSY    Background:    Preoperative diagnosis: GASTRIC VARICES  Postoperative diagnosis: Gastric varacies, gastritis, gastroparesis, no bleeding    :  Dr. Bess Doctor  Assistant(s): Endoscopy Technician-1: Jeanie Ortiz  Endoscopy RN-1: Alvina Veliz RN    Specimens: * No specimens in log *  H. Pylori  no    Assessment:  Intra-procedure medications       Anesthesia gave intra-procedure sedation and medications, see anesthesia flow sheet yes    Intravenous fluids: NS@ KVO     Vital signs stable yes    Abdominal assessment: round and soft yes    Recommendation:

## 2022-04-20 NOTE — PROGRESS NOTES
Endoscope was pre-cleaned at the bedside immediately following procedure by [facility]

## 2022-04-20 NOTE — ANESTHESIA PREPROCEDURE EVALUATION
Relevant Problems   RESPIRATORY SYSTEM   (+) Pneumonia      CARDIOVASCULAR   (+) CAD (coronary artery disease)   (+) Congestive heart failure, unspecified   (+) Coronary atherosclerosis of native coronary artery   (+) HTN (hypertension)      RENAL FAILURE   (+) ESRD (end stage renal disease) (HCC)   (+) ESRD (end stage renal disease) on dialysis (HCC)      HEMATOLOGY   (+) Anemia       Anesthetic History     PONV          Review of Systems / Medical History  Patient summary reviewed, nursing notes reviewed and pertinent labs reviewed    Pulmonary  Within defined limits                 Neuro/Psych     seizures         Cardiovascular    Hypertension      CHF  Dysrhythmias   CAD    Exercise tolerance: >4 METS     GI/Hepatic/Renal  Within defined limits              Endo/Other  Within defined limits           Other Findings   Comments: Kidney transplant  AICD         Physical Exam    Airway  Mallampati: II  TM Distance: 4 - 6 cm  Neck ROM: normal range of motion   Mouth opening: Normal     Cardiovascular  Regular rate and rhythm,  S1 and S2 normal,  no murmur, click, rub, or gallop  Rhythm: regular  Rate: normal         Dental  No notable dental hx       Pulmonary  Breath sounds clear to auscultation               Abdominal  GI exam deferred       Other Findings            Anesthetic Plan    ASA: 3  Anesthesia type: MAC          Induction: Intravenous  Anesthetic plan and risks discussed with: Patient

## 2022-04-20 NOTE — H&P
Jo Ann Preciado MD  [facility], [address]  [place], [address] [facility]  [phone]  [email or web address]      See office H and P. No interval change. Date of Surgery Update:  Mendez Landry was seen and examined. History and physical has been reviewed. The patient has been examined.  There have been no significant clinical changes since the completion of the originally dated History and Physical.    Signed By: Bobo Burden MD     April 20, 2022 12:10 PM

## 2022-04-20 NOTE — DISCHARGE INSTRUCTIONS
Nader Hernandez MD  [facility], [address] [id number]  [address]  [phone]  [email or web address]    Augusto Cortez  [id number]  1975    EGD DISCHARGE INSTRUCTIONS    Discomfort:  Sore throat- throat lozenges or warm salt water gargle  redness at IV site- apply warm compress to area; if redness or soreness persist- contact your physician  Gaseous discomfort- walking, belching will help relieve any discomfort  You may not operate a vehicle for 12 hours  You may not engage in an occupation involving machinery or appliances for rest of today  You may not drink alcoholic beverages for at least 12 hours  Avoid making any critical decisions for at least 24 hour  DIET  You may have anything by mouth. You may eat and drink immediately. You may resume your regular diet - however -  remember your colon is empty and a heavy meal will produce gas. Avoid these foods:  vegetables, fried / greasy foods, carbonated drinks      ACTIVITY  You may resume your normal daily activities   Spend the remainder of the day resting -  avoid any strenuous activity. CALL M.D. ANY SIGN OF   Increasing pain, nausea, vomiting  Abdominal distension (swelling)  New increased bleeding (oral or rectal)  Fever (chills)  Pain in chest area  Bloody discharge from nose or mouth  Shortness of breath    Follow-up Instructions:   Call Dr. Nader Hernandez for any questions or problems. Telephone # [phone]     Continue same medications. ENDOSCOPY FINDINGS:   Your endoscopy showed no bleeding and the ulcers have healed. The gastric varices are the same as December.       DISCHARGE SUMMARY from Nurse    The following personal items collected during your admission are returned to you:   Dental Appliance: Dental Appliances: None  Vision: Visual Aid: None  Hearing Aid:    Jewelry:    Clothing:    Other Valuables:    Valuables sent to safe:

## 2022-04-20 NOTE — PROCEDURES
[facility]                   Endoscopic Gastroduodenoscopy Procedure Note      4/20/2022  Hermann Lennon  1975  [id number]    Procedure: Endoscopic Gastroduodenoscopy --diagnostic    Indication: Follow up acute gastric ulcer and varices with bleeding     Pre-operative Diagnosis: see indication above    Post-operative Diagnosis: see findings below    : TOMÁS Vital MD    Referring Provider:  Norah Samuels MD      Anesthesia/Sedation:  MAC anesthesia Propofol    Airway assessment: No airway problems anticipated    Pre-Procedural Exam:      Airway: clear, no airway problems anticipated  Heart: RRR, without gallops or rubs  Lungs: clear bilaterally without wheezes, crackles, or rhonchi  Abdomen: soft, nontender, nondistended, bowel sounds present  Mental Status: awake, alert and oriented to person, place and time       Procedure Details     After infomed consent was obtained for the procedure, with all risks and benefits of procedure explained the patient was taken to the endoscopy suite and placed in the left lateral decubitus position. Following sequential administration of sedation as per above, the endoscope was inserted into the mouth and advanced under direct vision to second portion of the duodenum. A careful inspection was made as the gastroscope was withdrawn, including a retroflexed view of the proximal stomach; findings and interventions are described below. Findings:   Esophagus:normal mucosa. No esophageal varices  Stomach: large amount of retained food. No blood present. Healed ulcers in antrum. Large gastric varices one with hemoclip still attached. Duodenum: normal    Therapies:  none    Specimens: none           Complications:   None; patient tolerated the procedure well. EBL:  None. Impression:      -1. Esophagus normal  2. Gastroparesis  3. Large gastric varices  4. Healed gastric ulcers  5.  Normal duodenum    Recommendations:  -Continue acid suppression. , -Follow clinical symptoms and laboratory studies for evidence of rebleeding.     Dana Bean., MD4/20/2022

## 2022-05-17 NOTE — PROGRESS NOTES
Infectious Disease Clinic Note       Reason for visit: Follow up for  Sepsis from Streptococcus intermedius bacteremia       HPI:    Hospital admission:   Nov 2021          Subjective:   Reports doing well and had no acute complaints. Objective:    Vitals:   Virtual visit    Physical Exam:  Telephone call       Labs:  No results found for this or any previous visit (from the past 24 hour(s)). Assessment:  42y.o. year old male with history of ESRD on iHD since 2012, 2 failed renal transplants due to graft-medication non-compliance (last transplant in 2001) CAD with MI with 2 AICD placements (2009 - non-functioning due to fractured lead, then in 2019), MSSA bacteremia in 2017, MRSE bacteremia 10/2020 and possible L1-L2 discitis/osteomyelitis treated with 6 weeks of vancomycin, high-grade MRSE bacteremia 04/2020 s/p vascular ICD removal at [address] [place] [address] on 5/22/21 followed by ~4.5 weeks of vancomycin from ICD removal.  Patient has the nonendovascular ICD in his left chest from before.  This was not removed at [address] [place] [address] because the suspicion for involvement of this ICD from the infection was low. Also has hx of H/o DVT, s/p ivc filter/ h/o HIT. In Nov 2021 admission:  Sepsis from Streptococcus intermedius bacteremia (in 3/4 bottles from 11/5/21, Pen G ZECHARIAH <0.06ug/ml) . Symptoms ongoing for ~6 days PTA.  EVER done 11/11/21. No vegetations. CT maxillofacial showed multiple periapical abscess - difficult to exclude. Source of the Streptococcus intermedius bacteremia is from the teeth. Patient will need dental care evaluation right after discharge. The hospitalist team has communicated with Dr. Landon Dubose. Blood cultures from 11/7/2021. - Left chest ICD is non-endovascular. Hence, not at high risk for infectious involvement from bloodstream infections. Left chest US no fluid collections at the ICD. No focal suspicion seen for infectious involvement currently.    -Patient also had coagulase-negative staph in 1/4 bottles from 11/5/2021. This is likely a contaminant in the current clinical context, however given patient's past history with high-grade MRSE bacteremia, we have decided to treat with vancomycin. Vancomycin can be given with HD.  - On 11/15/2021 underwent angiogram of left arm dialysis graft and angioplasty of left subclavian vein for left arm swelling due to central venous stenosis, by vascular surgery. No concern for infection at this time. Recommendations:  Vancomycin course finished on 12/5/21. Patient is stable from ID-standpoint and no new recommendations to add today. For his dental \"multiple periapical abscess - difficult to exclude\" seen on CT, patient had been set up with Dr. Shweta Jasso. RETURN TO OFFICE: PRN      Thank you for the opportunity to participate in the care of this patient. Please contact with questions or concerns.       Jagdish Rojas MD  Infectious Diseases

## 2022-05-26 ENCOUNTER — HOSPITAL ENCOUNTER (OUTPATIENT)
Age: 47
Setting detail: OBSERVATION
Discharge: HOME | End: 2022-05-27
Attending: EMERGENCY MEDICINE | Admitting: INTERNAL MEDICINE
Payer: MEDICARE

## 2022-05-26 ENCOUNTER — APPOINTMENT (OUTPATIENT)
Dept: CT IMAGING | Age: 47
End: 2022-05-26
Attending: EMERGENCY MEDICINE
Payer: MEDICARE

## 2022-05-26 DIAGNOSIS — R11.2 NAUSEA AND VOMITING, UNSPECIFIED VOMITING TYPE: ICD-10-CM

## 2022-05-26 DIAGNOSIS — E87.6 HYPOKALEMIA: Primary | ICD-10-CM

## 2022-05-26 DIAGNOSIS — R10.84 ABDOMINAL PAIN, GENERALIZED: ICD-10-CM

## 2022-05-26 DIAGNOSIS — J90 PLEURAL EFFUSION: ICD-10-CM

## 2022-05-26 DIAGNOSIS — Z99.2 ESRD NEEDING DIALYSIS (HCC): ICD-10-CM

## 2022-05-26 DIAGNOSIS — N18.6 ESRD NEEDING DIALYSIS (HCC): ICD-10-CM

## 2022-05-26 LAB
ALBUMIN SERPL-MCNC: 3.1 G/DL (ref 3.5–5)
ALBUMIN/GLOB SERPL: 0.6 {RATIO} (ref 1.1–2.2)
ALP SERPL-CCNC: 225 U/L (ref 45–117)
ALT SERPL-CCNC: <6 U/L (ref 12–78)
ANION GAP SERPL CALC-SCNC: 8 MMOL/L (ref 5–15)
AST SERPL-CCNC: 10 U/L (ref 15–37)
BASOPHILS # BLD: 0.1 K/UL (ref 0–0.1)
BASOPHILS NFR BLD: 1 % (ref 0–1)
BILIRUB SERPL-MCNC: 0.7 MG/DL (ref 0.2–1)
BUN SERPL-MCNC: 18 MG/DL (ref 6–20)
BUN/CREAT SERPL: 4 (ref 12–20)
CALCIUM SERPL-MCNC: 9.5 MG/DL (ref 8.5–10.1)
CHLORIDE SERPL-SCNC: 96 MMOL/L (ref 97–108)
CO2 SERPL-SCNC: 31 MMOL/L (ref 21–32)
CREAT SERPL-MCNC: 5.09 MG/DL (ref 0.7–1.3)
DIFFERENTIAL METHOD BLD: ABNORMAL
EOSINOPHIL # BLD: 0.6 K/UL (ref 0–0.4)
EOSINOPHIL NFR BLD: 7 % (ref 0–7)
ERYTHROCYTE [DISTWIDTH] IN BLOOD BY AUTOMATED COUNT: 15.1 % (ref 11.5–14.5)
GLOBULIN SER CALC-MCNC: 5.5 G/DL (ref 2–4)
GLUCOSE SERPL-MCNC: 83 MG/DL (ref 65–100)
HCT VFR BLD AUTO: 24.6 % (ref 36.6–50.3)
HGB BLD-MCNC: 7.8 G/DL (ref 12.1–17)
IMM GRANULOCYTES # BLD AUTO: 0 K/UL (ref 0–0.04)
IMM GRANULOCYTES NFR BLD AUTO: 0 % (ref 0–0.5)
LIPASE SERPL-CCNC: 154 U/L (ref 73–393)
LYMPHOCYTES # BLD: 1.3 K/UL (ref 0.8–3.5)
LYMPHOCYTES NFR BLD: 15 % (ref 12–49)
MCH RBC QN AUTO: 28.7 PG (ref 26–34)
MCHC RBC AUTO-ENTMCNC: 31.7 G/DL (ref 30–36.5)
MCV RBC AUTO: 90.4 FL (ref 80–99)
MONOCYTES # BLD: 1 K/UL (ref 0–1)
MONOCYTES NFR BLD: 11 % (ref 5–13)
NEUTS SEG # BLD: 5.5 K/UL (ref 1.8–8)
NEUTS SEG NFR BLD: 66 % (ref 32–75)
NRBC # BLD: 0 K/UL (ref 0–0.01)
NRBC BLD-RTO: 0 PER 100 WBC
PLATELET # BLD AUTO: 193 K/UL (ref 150–400)
PMV BLD AUTO: 10 FL (ref 8.9–12.9)
POTASSIUM SERPL-SCNC: 2.7 MMOL/L (ref 3.5–5.1)
PROT SERPL-MCNC: 8.6 G/DL (ref 6.4–8.2)
RBC # BLD AUTO: 2.72 M/UL (ref 4.1–5.7)
SODIUM SERPL-SCNC: 135 MMOL/L (ref 136–145)
WBC # BLD AUTO: 8.4 K/UL (ref 4.1–11.1)

## 2022-05-26 PROCEDURE — 36415 COLL VENOUS BLD VENIPUNCTURE: CPT

## 2022-05-26 PROCEDURE — 80053 COMPREHEN METABOLIC PANEL: CPT

## 2022-05-26 PROCEDURE — 74011000250 HC RX REV CODE- 250: Performed by: INTERNAL MEDICINE

## 2022-05-26 PROCEDURE — 74011250637 HC RX REV CODE- 250/637: Performed by: EMERGENCY MEDICINE

## 2022-05-26 PROCEDURE — 83690 ASSAY OF LIPASE: CPT

## 2022-05-26 PROCEDURE — 74011250636 HC RX REV CODE- 250/636: Performed by: EMERGENCY MEDICINE

## 2022-05-26 PROCEDURE — G0378 HOSPITAL OBSERVATION PER HR: HCPCS

## 2022-05-26 PROCEDURE — 99285 EMERGENCY DEPT VISIT HI MDM: CPT

## 2022-05-26 PROCEDURE — 96365 THER/PROPH/DIAG IV INF INIT: CPT

## 2022-05-26 PROCEDURE — 74011250637 HC RX REV CODE- 250/637: Performed by: INTERNAL MEDICINE

## 2022-05-26 PROCEDURE — 96374 THER/PROPH/DIAG INJ IV PUSH: CPT

## 2022-05-26 PROCEDURE — 96368 THER/DIAG CONCURRENT INF: CPT

## 2022-05-26 PROCEDURE — 96366 THER/PROPH/DIAG IV INF ADDON: CPT

## 2022-05-26 PROCEDURE — 96376 TX/PRO/DX INJ SAME DRUG ADON: CPT

## 2022-05-26 PROCEDURE — 74011250636 HC RX REV CODE- 250/636: Performed by: INTERNAL MEDICINE

## 2022-05-26 PROCEDURE — 74011000250 HC RX REV CODE- 250: Performed by: EMERGENCY MEDICINE

## 2022-05-26 PROCEDURE — 96375 TX/PRO/DX INJ NEW DRUG ADDON: CPT

## 2022-05-26 PROCEDURE — 85025 COMPLETE CBC W/AUTO DIFF WBC: CPT

## 2022-05-26 PROCEDURE — 74176 CT ABD & PELVIS W/O CONTRAST: CPT

## 2022-05-26 RX ORDER — MORPHINE SULFATE 2 MG/ML
1 INJECTION, SOLUTION INTRAMUSCULAR; INTRAVENOUS
Status: DISCONTINUED | OUTPATIENT
Start: 2022-05-26 | End: 2022-05-27 | Stop reason: HOSPADM

## 2022-05-26 RX ORDER — METOPROLOL SUCCINATE 25 MG/1
12.5 TABLET, EXTENDED RELEASE ORAL DAILY
Status: DISCONTINUED | OUTPATIENT
Start: 2022-05-26 | End: 2022-05-27 | Stop reason: HOSPADM

## 2022-05-26 RX ORDER — ATORVASTATIN CALCIUM 20 MG/1
20 TABLET, FILM COATED ORAL DAILY
Status: DISCONTINUED | OUTPATIENT
Start: 2022-05-26 | End: 2022-05-27 | Stop reason: HOSPADM

## 2022-05-26 RX ORDER — ONDANSETRON 2 MG/ML
4 INJECTION INTRAMUSCULAR; INTRAVENOUS
Status: DISCONTINUED | OUTPATIENT
Start: 2022-05-26 | End: 2022-05-27 | Stop reason: HOSPADM

## 2022-05-26 RX ORDER — SUCRALFATE 1 G/1
1 TABLET ORAL
Status: DISCONTINUED | OUTPATIENT
Start: 2022-05-26 | End: 2022-05-27 | Stop reason: HOSPADM

## 2022-05-26 RX ORDER — POTASSIUM CHLORIDE 7.45 MG/ML
10 INJECTION INTRAVENOUS
Status: COMPLETED | OUTPATIENT
Start: 2022-05-26 | End: 2022-05-26

## 2022-05-26 RX ORDER — DICYCLOMINE HYDROCHLORIDE 20 MG/1
20 TABLET ORAL
Status: DISCONTINUED | OUTPATIENT
Start: 2022-05-26 | End: 2022-05-27 | Stop reason: HOSPADM

## 2022-05-26 RX ORDER — ONDANSETRON 2 MG/ML
4 INJECTION INTRAMUSCULAR; INTRAVENOUS
Status: COMPLETED | OUTPATIENT
Start: 2022-05-26 | End: 2022-05-26

## 2022-05-26 RX ORDER — ACETAMINOPHEN 650 MG/1
650 SUPPOSITORY RECTAL
Status: DISCONTINUED | OUTPATIENT
Start: 2022-05-26 | End: 2022-05-27 | Stop reason: HOSPADM

## 2022-05-26 RX ORDER — SODIUM CHLORIDE 0.9 % (FLUSH) 0.9 %
5-40 SYRINGE (ML) INJECTION AS NEEDED
Status: DISCONTINUED | OUTPATIENT
Start: 2022-05-26 | End: 2022-05-27 | Stop reason: SDUPTHER

## 2022-05-26 RX ORDER — SODIUM CHLORIDE 0.9 % (FLUSH) 0.9 %
5-40 SYRINGE (ML) INJECTION EVERY 8 HOURS
Status: DISCONTINUED | OUTPATIENT
Start: 2022-05-26 | End: 2022-05-27 | Stop reason: SDUPTHER

## 2022-05-26 RX ORDER — POTASSIUM CHLORIDE 20 MEQ/1
40 TABLET, EXTENDED RELEASE ORAL
Status: COMPLETED | OUTPATIENT
Start: 2022-05-26 | End: 2022-05-26

## 2022-05-26 RX ORDER — MAGNESIUM SULFATE HEPTAHYDRATE 40 MG/ML
2 INJECTION, SOLUTION INTRAVENOUS ONCE
Status: COMPLETED | OUTPATIENT
Start: 2022-05-26 | End: 2022-05-26

## 2022-05-26 RX ORDER — FACIAL-BODY WIPES
10 EACH TOPICAL DAILY PRN
Status: DISCONTINUED | OUTPATIENT
Start: 2022-05-26 | End: 2022-05-27 | Stop reason: HOSPADM

## 2022-05-26 RX ORDER — ACETAMINOPHEN 325 MG/1
650 TABLET ORAL
Status: DISCONTINUED | OUTPATIENT
Start: 2022-05-26 | End: 2022-05-27 | Stop reason: HOSPADM

## 2022-05-26 RX ORDER — CALCITRIOL 0.25 UG/1
0.5 CAPSULE ORAL DAILY
Status: DISCONTINUED | OUTPATIENT
Start: 2022-05-26 | End: 2022-05-27 | Stop reason: HOSPADM

## 2022-05-26 RX ORDER — SODIUM CHLORIDE 0.9 % (FLUSH) 0.9 %
5-40 SYRINGE (ML) INJECTION EVERY 8 HOURS
Status: DISCONTINUED | OUTPATIENT
Start: 2022-05-26 | End: 2022-05-27 | Stop reason: HOSPADM

## 2022-05-26 RX ORDER — FENTANYL CITRATE 50 UG/ML
50 INJECTION, SOLUTION INTRAMUSCULAR; INTRAVENOUS
Status: COMPLETED | OUTPATIENT
Start: 2022-05-26 | End: 2022-05-26

## 2022-05-26 RX ORDER — POLYETHYLENE GLYCOL 3350 17 G/17G
17 POWDER, FOR SOLUTION ORAL DAILY PRN
Status: DISCONTINUED | OUTPATIENT
Start: 2022-05-26 | End: 2022-05-27 | Stop reason: HOSPADM

## 2022-05-26 RX ORDER — SODIUM CHLORIDE 0.9 % (FLUSH) 0.9 %
5-40 SYRINGE (ML) INJECTION AS NEEDED
Status: DISCONTINUED | OUTPATIENT
Start: 2022-05-26 | End: 2022-05-27 | Stop reason: HOSPADM

## 2022-05-26 RX ORDER — SEVELAMER CARBONATE 800 MG/1
1600 TABLET, FILM COATED ORAL 3 TIMES DAILY
Status: DISCONTINUED | OUTPATIENT
Start: 2022-05-26 | End: 2022-05-27 | Stop reason: HOSPADM

## 2022-05-26 RX ORDER — PANTOPRAZOLE SODIUM 40 MG/1
40 TABLET, DELAYED RELEASE ORAL
Status: DISCONTINUED | OUTPATIENT
Start: 2022-05-26 | End: 2022-05-27 | Stop reason: HOSPADM

## 2022-05-26 RX ADMIN — SEVELAMER CARBONATE 1600 MG: 800 TABLET, FILM COATED ORAL at 17:47

## 2022-05-26 RX ADMIN — POTASSIUM CHLORIDE 10 MEQ: 7.46 INJECTION, SOLUTION INTRAVENOUS at 07:54

## 2022-05-26 RX ADMIN — FENTANYL CITRATE 50 MCG: 50 INJECTION, SOLUTION INTRAMUSCULAR; INTRAVENOUS at 06:53

## 2022-05-26 RX ADMIN — SODIUM CHLORIDE, PRESERVATIVE FREE 10 ML: 5 INJECTION INTRAVENOUS at 06:53

## 2022-05-26 RX ADMIN — POTASSIUM CHLORIDE 40 MEQ: 20 TABLET, EXTENDED RELEASE ORAL at 08:00

## 2022-05-26 RX ADMIN — ONDANSETRON 4 MG: 2 INJECTION INTRAMUSCULAR; INTRAVENOUS at 06:53

## 2022-05-26 RX ADMIN — CALCITRIOL 0.5 MCG: 0.25 CAPSULE ORAL at 09:41

## 2022-05-26 RX ADMIN — SODIUM CHLORIDE, PRESERVATIVE FREE 10 ML: 5 INJECTION INTRAVENOUS at 21:43

## 2022-05-26 RX ADMIN — POTASSIUM CHLORIDE 10 MEQ: 7.46 INJECTION, SOLUTION INTRAVENOUS at 10:58

## 2022-05-26 RX ADMIN — SODIUM CHLORIDE, PRESERVATIVE FREE 10 ML: 5 INJECTION INTRAVENOUS at 16:02

## 2022-05-26 RX ADMIN — FENTANYL CITRATE 50 MCG: 50 INJECTION, SOLUTION INTRAMUSCULAR; INTRAVENOUS at 16:03

## 2022-05-26 RX ADMIN — FENTANYL CITRATE 50 MCG: 50 INJECTION, SOLUTION INTRAMUSCULAR; INTRAVENOUS at 10:57

## 2022-05-26 RX ADMIN — PANTOPRAZOLE SODIUM 40 MG: 40 TABLET, DELAYED RELEASE ORAL at 16:12

## 2022-05-26 RX ADMIN — SODIUM CHLORIDE, PRESERVATIVE FREE 10 ML: 5 INJECTION INTRAVENOUS at 08:26

## 2022-05-26 RX ADMIN — MAGNESIUM SULFATE HEPTAHYDRATE 2 G: 40 INJECTION, SOLUTION INTRAVENOUS at 07:59

## 2022-05-26 RX ADMIN — SUCRALFATE 1 G: 1 TABLET ORAL at 21:42

## 2022-05-26 RX ADMIN — SEVELAMER CARBONATE 1600 MG: 800 TABLET, FILM COATED ORAL at 09:40

## 2022-05-26 RX ADMIN — PANTOPRAZOLE SODIUM 40 MG: 40 TABLET, DELAYED RELEASE ORAL at 08:41

## 2022-05-26 RX ADMIN — SUCRALFATE 1 G: 1 TABLET ORAL at 11:33

## 2022-05-26 RX ADMIN — SEVELAMER CARBONATE 1600 MG: 800 TABLET, FILM COATED ORAL at 21:59

## 2022-05-26 RX ADMIN — POTASSIUM CHLORIDE 10 MEQ: 7.46 INJECTION, SOLUTION INTRAVENOUS at 08:51

## 2022-05-26 RX ADMIN — SUCRALFATE 1 G: 1 TABLET ORAL at 16:12

## 2022-05-26 RX ADMIN — METOPROLOL SUCCINATE 12.5 MG: 25 TABLET, FILM COATED, EXTENDED RELEASE ORAL at 09:39

## 2022-05-26 RX ADMIN — ATORVASTATIN CALCIUM 20 MG: 10 TABLET, FILM COATED ORAL at 08:41

## 2022-05-26 RX ADMIN — POTASSIUM CHLORIDE 10 MEQ: 7.46 INJECTION, SOLUTION INTRAVENOUS at 09:52

## 2022-05-26 RX ADMIN — MORPHINE SULFATE 1 MG: 2 INJECTION, SOLUTION INTRAMUSCULAR; INTRAVENOUS at 08:45

## 2022-05-26 RX ADMIN — MORPHINE SULFATE 1 MG: 2 INJECTION, SOLUTION INTRAMUSCULAR; INTRAVENOUS at 21:42

## 2022-05-26 NOTE — ROUTINE PROCESS
TRANSFER - IN REPORT:    Verbal report received from Tono (name) on Frankie Mancia  being received from ER room 11 (unit) for routine progression of care      Report consisted of patients Situation, Background, Assessment and   Recommendations(SBAR). Information from the following report(s) SBAR, ED Summary and Recent Results was reviewed with the receiving nurse. Opportunity for questions and clarification was provided. Assessment completed upon patients arrival to unit and care assumed.

## 2022-05-26 NOTE — H&P
Hospitalist Admission Note    NAME: Frankie Mancia   :  1975   MRN:  [id number]     Date/Time:  2022 8:16 AM    Patient PCP: Nica Wallace MD  ______________________________________________________________________  Given the patient's current clinical presentation, I have a high level of concern for decompensation if discharged from the emergency department. Complex decision making was performed, which includes reviewing the patient's available past medical records, laboratory results, and x-ray films. My assessment of this patient's clinical condition and my plan of care is as follows. Assessment / Plan:  Abdominal pain with nausea and vomiting most likely gastroenteritis  Hypokalemia  H/o  of gastric varices /ulcer   Admit under observation, as needed Zofran, potassium repleted by the ED physician, patient also given magnesium. Will place on clear liquid diet. If recurrent nausea vomiting might need to consult GI  C/w  with home dose of PPI  CT scan showed  Moderate right-sided pleural effusion with basilar atelectasis, cardiomegaly and  coronary artery disease.   Fecal stasis extensive.   Other incidental findings are stable described above. .    Incidental and/or nonemergent findings are as described in detail above. As needed Dulcolax ordered, continue with MiraLAX    End-stage renal disease on hemodialysis at home  Hypertension  Consult nephrology for hemodialysis needs  Continue with home dose Toprol    CAD  Systolic CHF s/post AICD  Continue with home dose statin    Code Status: Full code  Surrogate Decision Maker: Sister    DVT Prophylaxis: scd  GI Prophylaxis: not indicated    Baseline: Ambulatory      Subjective:   CHIEF COMPLAINT: Nausea vomiting abdominal pain    HISTORY OF PRESENT ILLNESS:     Roxana Saint is a 42 y.o.    male past medical history of gastric ulcer, CAD, end-stage renal disease on home hemodialysis, hypertension systolic CHF status post AICD who presents with complaint of abdominal pain associated with nausea and vomiting of nonbloody emesis . Patient denies any constipation, chest pain, shortness of breath. He also denies any fever chills. In the  ED, his vital signs were stable, his labs revealed hemoglobin 7.8, BMP revealed potassium 2.7, elevated alk phos. CT  abdomen and pelvis without contrast showed: Moderate right-sided pleural effusion with basilar atelectasis, cardiomegaly and  coronary artery disease.   Fecal stasis extensive.   Other incidental findings are stable described above. Ne Mccreedy and/or nonemergent findings are as described in detail above. We were asked to admit for work up and evaluation of the above problems. Past Medical History:   Diagnosis Date    Abdominal hematoma     AICD (automatic cardioverter/defibrillator) present     CAD (coronary artery disease)     anterior MI s/p 2 stents  2007    Chronic abdominal pain     Chronic kidney disease     ESRD; Dialysis dependent.  [facility] [address] home x5 days week M-F does labs    DVT (deep venous thrombosis) ([address] 2001    DVT of popliteal vein ([address] 11/2011    not anticoagulated due to bleeding, IVC filter    Endocarditis     Gallstone pancreatitis     Gastrointestinal disorder     acid reflux    Gastrointestinal disorder     peptic ulcer    Hemodialysis patient ([address]     High cholesterol     HIT (heparin-induced thrombocytopenia) ([address]     Hypertension     Kidney transplant     b/l kidneys 1995, 2001    Long term current use of anticoagulant therapy     Nephrotic syndrome     Other ill-defined conditions(799.89)     kidny transplant x2,  on dialysis    Other ill-defined conditions(799.89)     home dialysis    Other ill-defined conditions(799.89)     hx recurrent left leg DVT    Peritonitis ([address]     Seizures ([address] 2015    most recent- only had three in lifetime    Small bowel obstruction (HCC)     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[facility]     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    [facility])         Past Surgical History:   Procedure Laterality Date    HX APPENDECTOMY      HX CHOLECYSTECTOMY      HX OTHER SURGICAL  11/2011    exploratory laparotomy    HX OTHER SURGICAL      fem-pop    HX OTHER SURGICAL  02/2013    right upper extremity fistula    HX PACEMAKER Left 6/2009    AICD-st latonya-not connected    HX PACEMAKER Left     AICD-left side-BS-working    HX SMALL BOWEL RESECTION      HX TRANSPLANT  2001     Kidney    HX TRANSPLANT  1992    kidney    HX VASCULAR ACCESS Right     femoral access for HD- does 5 day dialysis @ home/left upper arm graft active, right upper arm occluded    IR INSERT NON TUNL CVC OVER 5 YRS  12/7/2021    IR INSERT NON TUNL CVC OVER 5 YRS  12/10/2021    IR INSERT TIPS HEPATIC SHUNT  12/30/2021    IR REMOVE TUNL CVAD W/O PORT / PUMP  10/29/2020    IR REPLACE CVC TUNNELED W/O PORT  9/10/2020    GA CARDIAC SURG PROCEDURE UNLIST  2007    2 stents    GA EDG US EXAM SURGICAL ALTER STOM DUODENUM/JEJUNUM  7/15/2011         GA ESOPHAGOGASTRODUODENOSCOPY TRANSORAL DIAGNOSTIC  5/24/2012         UPPER GI ENDOSCOPY,CTRL BLEED  12/6/2021         UPPER GI ENDOSCOPY,DIAGNOSIS  12/8/2021         UPPER GI ENDOSCOPY,DIAGNOSIS  4/20/2022         VASCULAR SURGERY PROCEDURE UNLIST  11/14/2017    Insertion AV graft right upper arm (bovine); ligation of AV fistula right arm       Social History     Tobacco Use    Smoking status: Never Smoker    Smokeless tobacco: Never Used   Substance Use Topics    Alcohol use: No        Family History   Problem Relation Age of Onset    COPD Mother     Lung Disease Mother     Cancer Father         stomach    Cancer Maternal Grandmother      Allergies   Allergen Reactions    Shellfish Containing Products Swelling     Break out in rash, trouble breathing    Contrast Agent [Iodine] Shortness of Breath    Heparin Analogues Other (comments)     Positive history of HIT        Prior to Admission medications    Medication Sig Start Date End Date Taking? Authorizing Provider   sevelamer carbonate (Renvela) 800 mg tab tab Take 1,600 mg by mouth three (3) times daily. Provider, Historical   ondansetron hcl (Zofran) 4 mg tablet Take 1 Tablet by mouth every twelve (12) hours as needed for Nausea or Vomiting. 4/6/22   Marcell Treviño MD   dicyclomine (BENTYL) 20 mg tablet Take 1 Tablet by mouth every six (6) hours as needed for Abdominal Cramps. 4/6/22   Marcell Treviño MD   metoprolol succinate (TOPROL-XL) 25 mg XL tablet Take 12.5 mg by mouth daily. 1/26/22   Provider, Historical   pantoprazole (PROTONIX) 40 mg tablet Take 1 Tablet by mouth Before breakfast and dinner. 1/5/22   Renuka Medina NP   sucralfate (CARAFATE) 1 gram tablet Take 1 Tablet by mouth Before breakfast, lunch, dinner and at bedtime. 1/5/22   Renuka Medina NP   polyethylene glycol (MIRALAX) 17 gram packet Take 1 Packet by mouth daily. 1/5/22   Carmela Clifton MD   propranoloL (INDERAL) 10 mg tablet Take 10 mg by mouth two (2) times a day. Patient not taking: Reported on 4/18/2022 12/24/21   Provider, Historical   Vitamin D2 1,250 mcg (50,000 unit) capsule Take 50,000 Units by mouth every seven (7) days. 10/15/21   Provider, Historical   calcium acetate,phosphat bind, (PHOSLO) 667 mg cap Take 2 Caps by mouth three (3) times daily (with meals). Indications: low amount of calcium in the blood, renal osteodystrophy with hyperphosphatemia  Patient not taking: Reported on 4/18/2022 11/10/20   Shiela Bean MD   atorvastatin (Lipitor) 20 mg tablet Take 20 mg by mouth daily. Provider, Historical   acetaminophen (TYLENOL) 500 mg tablet Take 1 Tab by mouth every four (4) hours as needed for Pain. Over the counter 1/20/19   Ivan Andrea MD   calcitRIOL (ROCALTROL) 0.5 mcg capsule Take 0.5 mcg by mouth daily.     Provider, Historical       REVIEW OF SYSTEMS:     I am not able to complete the review of systems because: The patient is intubated and sedated    The patient has altered mental status due to his acute medical problems    The patient has baseline aphasia from prior stroke(s)    The patient has baseline dementia and is not reliable historian    The patient is in acute medical distress and unable to provide information           Total of 12 systems reviewed as follows:       POSITIVE= underlined text  Negative = text not underlined  General:  fever, chills, sweats, generalized weakness, weight loss/gain,      loss of appetite   Eyes:    blurred vision, eye pain, loss of vision, double vision  ENT:    rhinorrhea, pharyngitis   Respiratory:   cough, sputum production, SOB, ZAMUDIO, wheezing, pleuritic pain   Cardiology:   chest pain, palpitations, orthopnea, PND, edema, syncope   Gastrointestinal:  abdominal pain , N/V, diarrhea, dysphagia, constipation, bleeding   Genitourinary:  frequency, urgency, dysuria, hematuria, incontinence   Muskuloskeletal :  arthralgia, myalgia, back pain  Hematology:  easy bruising, nose or gum bleeding, lymphadenopathy   Dermatological: rash, ulceration, pruritis, color change / jaundice  Endocrine:   hot flashes or polydipsia   Neurological:  headache, dizziness, confusion, focal weakness, paresthesia,     Speech difficulties, memory loss, gait difficulty  Psychological: Feelings of anxiety, depression, agitation    Objective:   VITALS:    Visit Vitals  BP (!) 131/48 (BP 1 Location: Right upper arm, BP Patient Position: At rest;Lying)   Pulse 89   Temp 98.4 °F (36.9 °C)   Resp 18   Ht 5' 7\" (1.702 m)   Wt 59.7 kg (131 lb 9.8 oz)   SpO2 100%   BMI 20.61 kg/m²       PHYSICAL EXAM:    General:    Alert, cooperative, no distress, appears stated age. HEENT: Atraumatic, anicteric sclerae, pink conjunctivae     No oral ulcers, mucosa moist, throat clear, dentition fair  Neck:  Supple, symmetrical,  thyroid: non tender  Lungs:   Clear to auscultation bilaterally. No Wheezing or Rhonchi.  No rales.  Chest wall:  No tenderness  No Accessory muscle use. Heart:   Regular  rhythm,  No  murmur   No edema  Abdomen:   Soft,+tender. Not distended. Bowel sounds normal  Extremities: No cyanosis. No clubbing,      Skin turgor normal, Capillary refill normal, Radial dial pulse 2+  Skin:     Not pale. Not Jaundiced  No rashes   Psych:  Good insight. Not depressed. Not anxious or agitated. Neurologic: EOMs intact. No facial asymmetry. No aphasia or slurred speech. Symmetrical strength, Sensation grossly intact. Alert and oriented X 4.     _______________________________________________________________________  Care Plan discussed with:    Comments   Patient x    Family      RN x    Care Manager                    Consultant:      _______________________________________________________________________  Expected  Disposition:   Home with Family    HH/PT/OT/RN    SNF/LTC    KARLI    ________________________________________________________________________  TOTAL TIME:35 Minutes    Critical Care Provided     Minutes non procedure based      Comments     Reviewed previous records   >50% of visit spent in counseling and coordination of care  Discussion with patient and/or family and questions answered       ________________________________________________________________________  Signed: Ann-Marie Kuo MD    Procedures: see electronic medical records for all procedures/Xrays and details which were not copied into this note but were reviewed prior to creation of Plan.     LAB DATA REVIEWED:    Recent Results (from the past 24 hour(s))   CBC WITH AUTOMATED DIFF    Collection Time: 05/26/22  6:45 AM   Result Value Ref Range    WBC 8.4 4.1 - 11.1 K/uL    RBC 2.72 (L) 4.10 - 5.70 M/uL    HGB 7.8 (L) 12.1 - 17.0 g/dL    HCT 24.6 (L) 36.6 - 50.3 %    MCV 90.4 80.0 - 99.0 FL    MCH 28.7 26.0 - 34.0 PG    MCHC 31.7 30.0 - 36.5 g/dL    RDW 15.1 (H) 11.5 - 14.5 %    PLATELET 058 232 - 368 K/uL    MPV 10.0 8.9 - 12.9 FL    NRBC 0.0 0  WBC    ABSOLUTE NRBC 0.00 0.00 - 0.01 K/uL    NEUTROPHILS 66 32 - 75 %    LYMPHOCYTES 15 12 - 49 %    MONOCYTES 11 5 - 13 %    EOSINOPHILS 7 0 - 7 %    BASOPHILS 1 0 - 1 %    IMMATURE GRANULOCYTES 0 0.0 - 0.5 %    ABS. NEUTROPHILS 5.5 1.8 - 8.0 K/UL    ABS. LYMPHOCYTES 1.3 0.8 - 3.5 K/UL    ABS. MONOCYTES 1.0 0.0 - 1.0 K/UL    ABS. EOSINOPHILS 0.6 (H) 0.0 - 0.4 K/UL    ABS. BASOPHILS 0.1 0.0 - 0.1 K/UL    ABS. IMM. GRANS. 0.0 0.00 - 0.04 K/UL    DF AUTOMATED     METABOLIC PANEL, COMPREHENSIVE    Collection Time: 05/26/22  6:45 AM   Result Value Ref Range    Sodium 135 (L) 136 - 145 mmol/L    Potassium 2.7 (LL) 3.5 - 5.1 mmol/L    Chloride 96 (L) 97 - 108 mmol/L    CO2 31 21 - 32 mmol/L    Anion gap 8 5 - 15 mmol/L    Glucose 83 65 - 100 mg/dL    BUN 18 6 - 20 MG/DL    Creatinine 5.09 (H) 0.70 - 1.30 MG/DL    BUN/Creatinine ratio 4 (L) 12 - 20      GFR est AA 15 (L) >60 ml/min/1.73m2    GFR est non-AA 12 (L) >60 ml/min/1.73m2    Calcium 9.5 8.5 - 10.1 MG/DL    Bilirubin, total 0.7 0.2 - 1.0 MG/DL    ALT (SGPT) <6 (L) 12 - 78 U/L    AST (SGOT) 10 (L) 15 - 37 U/L    Alk.  phosphatase 225 (H) 45 - 117 U/L    Protein, total 8.6 (H) 6.4 - 8.2 g/dL    Albumin 3.1 (L) 3.5 - 5.0 g/dL    Globulin 5.5 (H) 2.0 - 4.0 g/dL    A-G Ratio 0.6 (L) 1.1 - 2.2     LIPASE    Collection Time: 05/26/22  6:45 AM   Result Value Ref Range    Lipase 154 73 - 393 U/L

## 2022-05-26 NOTE — PROGRESS NOTES
Transition of Care Plan:    RUR: N/A -Observation status. MOON notice reviewed and signed by pt on 5/26/22  Disposition: Home with sisterElizabeth Ma  Follow up appointments: PCP, Nephrology  DME needed: None anticipated  Transportation at Discharge: Self, has private vehicle at hospital  [place] or means to access home:  Has access      IM Medicare Letter: N/A - Obbservation status   Is patient a BCPI-A Bundle:   N/A        If yes, was Bundle Letter given?:    Is patient a  and connected with the [facility]? N/A               If yes, was [employer] transfer form completed and [facility] notified? Caregiver Contact: Pt's sisterNataliia Oas) [phone]  Discharge Caregiver contacted prior to discharge? To be contacted   Care Conference needed?:  Not indicated                   Reason for Admission:  Abdominal pain with nausea and vomiting most likely gastroenteritis, hypokalemia, h/o of gastric cancer                       RUR Score:     N/A -Observation status. Plan for utilizing home health:  Previous HH hx with [facility]. Pt denies need at this time for coordination of home health services. PCP: First and Last name:  Olga Cox MD     Name of Practice:    Are you a current patient: Yes/No: Yes   Approximate date of last visit: March 2022   Can you participate in a virtual visit with your PCP: No prefers in office                     Current Advanced Directive/Advance Care Plan: Full Code   Advance Care Planning   Healthcare Decision Maker:       Primary Decision Maker: Evy Gift Sister - [phone]    Today we documented Decision Maker(s). The patient will provide ACP documents. Healthcare Decision Maker:     Primary Decision Maker: Ari Oscar [facility] - [phone]                  Transition of Care Plan:  Home with sister, outpatient follow up appts, resumption of PD at home d/t ESRD    CM reviewed chart. CM completed assessment with pt at bedside.  CM introduced self, role of CM, verified demographics, and discussed transition of care planning. Pt plans to return home where he resides with his sister, Tabatha Bearden. Pt to drive self home. Pt denies concerns with transition of care plans at this time. Previous HH hx with [facility] and [facility]. [facility] [address] and [address]. Unit care management will continue to follow for transition of care planning needs. Care Management Interventions  PCP Verified by CM:  Yes  Palliative Care Criteria Met (RRAT>21 & CHF Dx)?: No  Mode of Transport at Discharge: Self (Private vehicle at hospital)  Transition of Care Consult (CM Consult): Discharge Planning  Discharge Durable Medical Equipment: No (None at baseline)  Physical Therapy Consult: No  Occupational Therapy Consult: No  Speech Therapy Consult: No  Support Systems: Other Family Member(s) (Sister, Tabatha Bearden)  Confirm Follow Up Transport: Self  Discharge Location  Patient Expects to be Discharged to[de-identified] Home with outpatient services (Home with sister, continued home PD, outpatient follow up appts)    [address]a [address], [address]

## 2022-05-26 NOTE — ED NOTES
TRANSITION OF CARE - SBAR OUT    Patient is being transferred to \[facility]\"" 1 Medical Oncology, Room# 1135. Report GIVEN TO MANUELITO Chou on [facility] Duty for routine progression of care. Report is consisted of the following information SBAR, ED Summary, Intake/Output, MAR, Accordion, Recent Results and Cardiac Rhythm Patient assessment. Patient transferred to receiving unit by: Transport staff (RN or Tech Name)     Called outstanding consults: Yes   Collected routine labs: Yes     All current orders reviewed with accepting nurse: Yes    The following personal items will be sent with the patient during transfer to the floor:   All valuables:                          CARDIAC MONITORING ORDERED: Yes     The following CURRENT information were reported to the receiving RN:    CODE STATUS: Full Code    NIH SCORE:    MAGALYS SCREENING:      NEURO ASSESSMENT:        RESTRAINTS IN USE: No      IS DOCUMENTATION COMPLETE: Yes      Vital Signs  Level of Consciousness: Alert (0) (05/26/22 0738)  Temp: 98.4 °F (36.9 °C) (05/26/22 0738)  Temp Source: Oral (05/26/22 0738)  Pulse (Heart Rate): 90 (05/26/22 1045)  Heart Rate Source: Monitor (05/26/22 0738)  Resp Rate: 29 (05/26/22 1045)  BP: (!) 121/58 (05/26/22 1045)  MAP (Monitor): 74 (05/26/22 1045)  MAP (Calculated): 79 (05/26/22 1045)  BP 1 Location: Right upper arm (05/26/22 0738)  BP 1 Method: Automatic (05/26/22 0738)  BP Patient Position: At rest,Lying (05/26/22 0738)  MEWS Score: 1 (05/26/22 0738)  Pain 1  Pain Scale 1: Numeric (0 - 10) (05/26/22 1053)  Pain Intensity 1: 6 (05/26/22 1053)  Patient Stated Pain Goal: 0 (05/26/22 1053)  Pain Reassessment 1: Yes (05/26/22 1053)  Pain Onset 1: This Afternoon (05/26/22 0247)  Pain Location 1: Abdomen (05/26/22 1053)  Pain Orientation 1: Anterior,Lower,Left (05/26/22 1053)  Pain Description 1: Dull,Gnawing (05/26/22 0738)  Pain Intervention(s) 1: Medication (see MAR) (05/26/22 1050)      OXYGEN: Oxygen Therapy  O2 Device: None (Room air) (05/26/22 1174)    CLEMENTINA FALL RISK:        WOUNDS: No      URINARY CATHETER: voiding    LINE ACCESS:   Peripheral IV 05/26/22 Right Antecubital (Active)        Opportunity for questions and clarification were provided.   Melina Degroot RN

## 2022-05-26 NOTE — ED PROVIDER NOTES
EMERGENCY DEPARTMENT HISTORY AND PHYSICAL EXAM      Date: 5/26/2022  Patient Name: Mendez Landry    History of Presenting Illness     Chief Complaint   Patient presents with    Vomiting    Abdominal Pain       History Provided By: Patient    HPI: Mendez Landry, 42 y.o. male with significant medical history including hypertension, end-stage renal disease on home HD presents to the ED with cc of nausea, vomiting, diffuse abdominal pain. Symptoms have been present over the past 24 hours. He endorses multiple episodes of nonbloody nonbilious emesis. He has been compliant with his hemodialysis at home and completed a course last night via left upper extremity AV fistula. He denies any fevers, chills, chest pain, shortness of breath. No aggravating or alleviating factors. Abdominal pain is described as generalized. There are no other complaints, changes, or physical findings at this time. PCP: Kinsey iPzano MD    No current facility-administered medications on file prior to encounter. Current Outpatient Medications on File Prior to Encounter   Medication Sig Dispense Refill    sevelamer carbonate (Renvela) 800 mg tab tab Take 1,600 mg by mouth three (3) times daily.  ondansetron hcl (Zofran) 4 mg tablet Take 1 Tablet by mouth every twelve (12) hours as needed for Nausea or Vomiting. 20 Tablet 0    dicyclomine (BENTYL) 20 mg tablet Take 1 Tablet by mouth every six (6) hours as needed for Abdominal Cramps. 20 Tablet 0    metoprolol succinate (TOPROL-XL) 25 mg XL tablet Take 12.5 mg by mouth daily.  pantoprazole (PROTONIX) 40 mg tablet Take 1 Tablet by mouth Before breakfast and dinner. 60 Tablet 2    sucralfate (CARAFATE) 1 gram tablet Take 1 Tablet by mouth Before breakfast, lunch, dinner and at bedtime. 120 Tablet 2    polyethylene glycol (MIRALAX) 17 gram packet Take 1 Packet by mouth daily. 30 Packet 0    propranoloL (INDERAL) 10 mg tablet Take 10 mg by mouth two (2) times a day. (Patient not taking: Reported on 4/18/2022)      Vitamin D2 1,250 mcg (50,000 unit) capsule Take 50,000 Units by mouth every seven (7) days.  calcium acetate,phosphat bind, (PHOSLO) 667 mg cap Take 2 Caps by mouth three (3) times daily (with meals). Indications: low amount of calcium in the blood, renal osteodystrophy with hyperphosphatemia (Patient not taking: Reported on 4/18/2022) 120 Cap 0    atorvastatin (Lipitor) 20 mg tablet Take 20 mg by mouth daily.  acetaminophen (TYLENOL) 500 mg tablet Take 1 Tab by mouth every four (4) hours as needed for Pain. Over the counter 30 Tab 0    calcitRIOL (ROCALTROL) 0.5 mcg capsule Take 0.5 mcg by mouth daily. Past History     Past Medical History:  Past Medical History:   Diagnosis Date    Abdominal hematoma     AICD (automatic cardioverter/defibrillator) present     CAD (coronary artery disease)     anterior MI s/p 2 stents  2007    Chronic abdominal pain     Chronic kidney disease     ESRD; Dialysis dependent.  [facility] home x5 days week M-F does labs    DVT (deep venous thrombosis) ([address] 2001    DVT of popliteal vein ([address] 11/2011    not anticoagulated due to bleeding, IVC filter    Endocarditis     Gallstone pancreatitis     Gastrointestinal disorder     acid reflux    Gastrointestinal disorder     peptic ulcer    Hemodialysis patient ([address]     High cholesterol     HIT (heparin-induced thrombocytopenia) ([address]     Hypertension     Kidney transplant     b/l kidneys 1995, 2001    Long term current use of anticoagulant therapy     Nephrotic syndrome     Other ill-defined conditions(799.89)     kidny transplant x2,  on dialysis    Other ill-defined conditions(799.89)     home dialysis    Other ill-defined conditions(799.89)     hx recurrent left leg DVT    Peritonitis ([address]     Seizures ([address] 2015    most recent- only had three in lifetime    Small bowel obstruction (HCC)     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    [facility])        Past Surgical History:  Past Surgical History:   Procedure Laterality Date    HX APPENDECTOMY      HX CHOLECYSTECTOMY      HX OTHER SURGICAL  11/2011    exploratory laparotomy    HX OTHER SURGICAL      fem-pop    HX OTHER SURGICAL  02/2013    right upper extremity fistula    HX PACEMAKER Left 6/2009    AICD-st latonya-not connected    HX PACEMAKER Left     AICD-left side-BS-working    HX SMALL BOWEL RESECTION      HX TRANSPLANT  2001     Kidney    HX TRANSPLANT  1992    kidney    HX VASCULAR ACCESS Right     femoral access for HD- does 5 day dialysis @ home/left upper arm graft active, right upper arm occluded    IR INSERT NON TUNL CVC OVER 5 YRS  12/7/2021    IR INSERT NON TUNL CVC OVER 5 YRS  12/10/2021    IR INSERT TIPS HEPATIC SHUNT  12/30/2021    IR REMOVE TUNL CVAD W/O PORT / PUMP  10/29/2020    IR REPLACE CVC TUNNELED W/O PORT  9/10/2020    IA CARDIAC SURG PROCEDURE UNLIST  2007    2 stents    IA EDG US EXAM SURGICAL ALTER STOM DUODENUM/JEJUNUM  7/15/2011         IA ESOPHAGOGASTRODUODENOSCOPY TRANSORAL DIAGNOSTIC  5/24/2012         UPPER GI ENDOSCOPY,CTRL BLEED  12/6/2021         UPPER GI ENDOSCOPY,DIAGNOSIS  12/8/2021         UPPER GI ENDOSCOPY,DIAGNOSIS  4/20/2022         VASCULAR SURGERY PROCEDURE UNLIST  11/14/2017    Insertion AV graft right upper arm (bovine); ligation of AV fistula right arm       Family History:  Family History   Problem Relation Age of Onset    COPD Mother     Lung Disease Mother     Cancer Father         stomach    Cancer Maternal Grandmother        Social History:  Social History     Tobacco Use    Smoking status: Never Smoker    Smokeless tobacco: Never Used   Vaping Use    Vaping Use: Never used   Substance Use Topics    Alcohol use: No    Drug use: Never       Allergies:   Allergies   Allergen Reactions    Shellfish Containing Products Swelling     Break out in rash, trouble breathing    Contrast Agent [Iodine] Shortness of Breath    Heparin Analogues Other (comments)     Positive history of HIT         Review of Systems   Review of Systems   Constitutional: Positive for appetite change. Negative for chills and fever. Respiratory: Negative for cough and shortness of breath. Cardiovascular: Negative for chest pain and leg swelling. Gastrointestinal: Positive for abdominal pain, nausea and vomiting. Musculoskeletal: Negative for back pain and gait problem. Skin: Negative for color change and rash. Neurological: Negative for dizziness, weakness, light-headedness and headaches. All other systems reviewed and are negative. Physical Exam   Physical Exam  Vitals and nursing note reviewed. Constitutional:       General: He is not in acute distress. Appearance: Normal appearance. He is not ill-appearing or toxic-appearing. HENT:      Head: Normocephalic and atraumatic. Nose: Nose normal.      Mouth/Throat:      Mouth: Mucous membranes are moist.   Eyes:      Extraocular Movements: Extraocular movements intact. Pupils: Pupils are equal, round, and reactive to light. Cardiovascular:      Rate and Rhythm: Normal rate and regular rhythm. Heart sounds: No murmur heard. Pulmonary:      Effort: Pulmonary effort is normal. No respiratory distress. Breath sounds: Normal breath sounds. No wheezing. Abdominal:      General: There is no distension. Palpations: Abdomen is soft. Tenderness: There is generalized abdominal tenderness. There is no guarding or rebound. Hernia: No hernia is present. Musculoskeletal:         General: No swelling or tenderness. Normal range of motion. Cervical back: Normal range of motion and neck supple. Right lower leg: No edema. Left lower leg: No edema. Comments: Left upper extremity AV fistula noted   Skin:     General: Skin is warm and dry. Coloration: Skin is not pale. Findings: No erythema. Neurological:      General: No focal deficit present. Mental Status: He is alert and oriented to person, place, and time. Diagnostic Study Results     Labs -     Recent Results (from the past 12 hour(s))   CBC WITH AUTOMATED DIFF    Collection Time: 05/26/22  6:45 AM   Result Value Ref Range    WBC 8.4 4.1 - 11.1 K/uL    RBC 2.72 (L) 4.10 - 5.70 M/uL    HGB 7.8 (L) 12.1 - 17.0 g/dL    HCT 24.6 (L) 36.6 - 50.3 %    MCV 90.4 80.0 - 99.0 FL    MCH 28.7 26.0 - 34.0 PG    MCHC 31.7 30.0 - 36.5 g/dL    RDW 15.1 (H) 11.5 - 14.5 %    PLATELET 231 099 - 081 K/uL    MPV 10.0 8.9 - 12.9 FL    NRBC 0.0 0  WBC    ABSOLUTE NRBC 0.00 0.00 - 0.01 K/uL    NEUTROPHILS 66 32 - 75 %    LYMPHOCYTES 15 12 - 49 %    MONOCYTES 11 5 - 13 %    EOSINOPHILS 7 0 - 7 %    BASOPHILS 1 0 - 1 %    IMMATURE GRANULOCYTES 0 0.0 - 0.5 %    ABS. NEUTROPHILS 5.5 1.8 - 8.0 K/UL    ABS. LYMPHOCYTES 1.3 0.8 - 3.5 K/UL    ABS. MONOCYTES 1.0 0.0 - 1.0 K/UL    ABS. EOSINOPHILS 0.6 (H) 0.0 - 0.4 K/UL    ABS. BASOPHILS 0.1 0.0 - 0.1 K/UL    ABS. IMM. GRANS. 0.0 0.00 - 0.04 K/UL    DF AUTOMATED     METABOLIC PANEL, COMPREHENSIVE    Collection Time: 05/26/22  6:45 AM   Result Value Ref Range    Sodium 135 (L) 136 - 145 mmol/L    Potassium 2.7 (LL) 3.5 - 5.1 mmol/L    Chloride 96 (L) 97 - 108 mmol/L    CO2 31 21 - 32 mmol/L    Anion gap 8 5 - 15 mmol/L    Glucose 83 65 - 100 mg/dL    BUN 18 6 - 20 MG/DL    Creatinine 5.09 (H) 0.70 - 1.30 MG/DL    BUN/Creatinine ratio 4 (L) 12 - 20      GFR est AA 15 (L) >60 ml/min/1.73m2    GFR est non-AA 12 (L) >60 ml/min/1.73m2    Calcium 9.5 8.5 - 10.1 MG/DL    Bilirubin, total 0.7 0.2 - 1.0 MG/DL    ALT (SGPT) <6 (L) 12 - 78 U/L    AST (SGOT) 10 (L) 15 - 37 U/L    Alk.  phosphatase 225 (H) 45 - 117 U/L    Protein, total 8.6 (H) 6.4 - 8.2 g/dL    Albumin 3.1 (L) 3.5 - 5.0 g/dL    Globulin 5.5 (H) 2.0 - 4.0 g/dL    A-G Ratio 0.6 (L) 1.1 - 2.2     LIPASE    Collection Time: 05/26/22  6:45 AM   Result Value Ref Range    Lipase 154 73 - 393 U/L       Radiologic Studies -   CT ABD PELV WO CONT   Final Result   Moderate right-sided pleural effusion with basilar atelectasis, cardiomegaly and   coronary artery disease. Fecal stasis extensive. Other incidental findings are stable described above. .       Incidental and/or nonemergent findings are as described in detail above. CT Results  (Last 48 hours)               05/26/22 0659  CT ABD PELV WO CONT Final result    Impression:  Moderate right-sided pleural effusion with basilar atelectasis, cardiomegaly and   coronary artery disease. Fecal stasis extensive. Other incidental findings are stable described above. .        Incidental and/or nonemergent findings are as described in detail above. Narrative:  CLINICAL HISTORY: diffuse abd pain, n/v, LLQ pain    INDICATION: diffuse abd pain, n/v, LLQ pain   COMPARISON: 4/5/2022   CONTRAST:  None. TECHNIQUE:    Thin axial images were obtained through the abdomen and pelvis. Coronal and   sagittal reformats were generated. Oral contrast was not administered. CT dose   reduction was achieved through use of a standardized protocol tailored for this   examination and automatic exposure control for dose modulation. The absence of intravenous contrast material reduces the sensitivity for   evaluation of visceral organs and vasculature including presence of small mass   lesions, hemodynamically significant stenoses, dissections, mucosal   abnormalities etc.       FINDINGS:    LOWER THORAX: Moderate to large right pleural effusion. Cardiomegaly. Coronary   artery disease. Poststernotomy. Right basilar atelectasis/consolidation   increased. LIVER/GALLBLADDER: Hepatomegaly and nodular hepatic contour. Cholecystectomy CBD   is not dilated. SPLEEN/PANCREAS:  within normal limits. ADRENALS/KIDNEYS: Renal atrophy IVC filter. STOMACH: Unremarkable.    SMALL BOWEL/COLON: No dilatation or wall thickening. Fecal stasis is extensive. Dystrophic calcifications and stent in the left hemipelvis. APPENDIX: Not visualized   PERITONEUM: Rim calcified central mesenteric lesion is stable. RETROPERITONEUM: Extensive atherosclerotic change. REPRODUCTIVE ORGANS:   URINARY BLADDER: Nondistended. BONES: Renal osteodystrophy. ADDITIONAL COMMENTS: N/A               CXR Results  (Last 48 hours)    None          Medical Decision Making   I am the first provider for this patient. I reviewed the vital signs, available nursing notes, past medical history, past surgical history, family history and social history. Vital Signs-Reviewed the patient's vital signs. Patient Vitals for the past 12 hrs:   Temp Pulse Resp BP SpO2   05/26/22 1223 98.5 °F (36.9 °C) 88 21 (!) 141/69 100 %   05/26/22 1045 -- 90 29 (!) 121/58 97 %   05/26/22 1015 -- 90 21 (!) 97/59 100 %   05/26/22 1000 -- 98 25 (!) 98/42 100 %   05/26/22 0945 -- 92 20 (!) 106/58 100 %   05/26/22 0939 -- 99 -- (!) 101/55 --   05/26/22 0848 -- -- 22 -- --   05/26/22 0845 -- 95 (!) 32 (!) 105/59 99 %   05/26/22 0738 98.4 °F (36.9 °C) 89 18 (!) 131/48 100 %       Records Reviewed: Nursing Notes    Provider Notes (Medical Decision Making):   57-year-old male with above medical history presenting with nausea, vomiting, generalized abdominal pain. He is afebrile and vital signs stable. CT without acute process. He is noted to have hypokalemia 2.7. I will replete electrolytes and admit for further management as he is still unable to tolerate p.o. ED Course:   Initial assessment performed. The patients presenting problems have been discussed, and they are in agreement with the care plan formulated and outlined with them. I have encouraged them to ask questions as they arise throughout their visit. Cardiac Monitoring:   The cardiac monitor revealed the following rhythm as interpreted by me: Normal Sinus Rhythm, rate 80 bpm  The cardiac monitor was ordered secondary to the patient's reported complaint of hypokalemia and weakness and to monitor the patient for dysrhythmia. Missael Morfin MD      Admission Note:  Patient is being admitted to the hospital by Dr. Rolan Osman, Service: Hospitalist.  The results of their tests and reasons for their admission have been discussed with them and available family. They convey agreement and understanding for the need to be admitted and for their admission diagnosis. Critical Care Documentation  I have spent 36 minutes of critical care time in evaluating and treating this patient. This includes time spent at bedside, time with family and decision makers, documentation, review of labs and imaging, and/or consultation with specialists. It does not include time spent on separately billed procedures. This patient presents with a critical illness or injury that acutely impairs one or more vital organ systems such that there is a high probability of imminent or life threatening deterioration in the patient's condition. This case involved decision making of high complexity to assess, manipulate, and support vital organ system failure and/or to prevent further life threatening deterioration of the patient's condition. Failure to initiate these interventions on an urgent basis would likely result in sudden, clinically significant or life threatening deterioration in the patient's condition. This case required my direct attention, intervention, and personal management for the time documented above. This time does not include separately billed interventions/procedures which are separately documented.      Abnormal findings supporting critical care: Acute hyperkalemia and end-stage renal disease secondary to nausea, vomiting, poor p.o. intake  Interventions to support critical care: Treatment of IV magnesium, p.o. potassium, and multiple runs of KCl, frequent reassessment  Failure to intervene may result in: Worsening electrolyte abnormality, arrhythmia, death  Leslie Vines MD      Disposition:  Admit    Diagnosis     Clinical Impression:   1. Hypokalemia    2. Abdominal pain, generalized    3. Nausea and vomiting, unspecified vomiting type    4. Pleural effusion    5. ESRD needing dialysis [facility])        Attestations:  I am the first and primary provider of record for this patient's ED encounter. I personally performed the services described above in this documentation. Leslie Vines MD    Please note that this dictation was completed with [employer], the computer voice recognition software. Quite often unanticipated grammatical, syntax, homophones, and other interpretive errors are inadvertently transcribed by the computer software. Please disregard these errors. Please excuse any errors that have escaped final proofreading. Thank you.

## 2022-05-26 NOTE — ED TRIAGE NOTES
Patient presents to triage ambulatory complaining of nausea, vomiting and lower left abdominal pain. Patient reports the pain started this afternoon. Patient does reports a history of bleeding ulcers, however there has been NO blood in his recent emesis. Patient denies any ill exposure. Patient denies any fevers, or chills. Patient does acknowledge body aches.

## 2022-05-26 NOTE — CONSULTS
Nephrology Consult Note     Blaise Deana     www. [facility]                    Phone - [phone]   Patient: Ross Hamm   YOB: 1975    Date- 5/26/2022  MRN: [id number]            REASON FOR CONSULTATION: H/O ESRD, continuation of hemodialysis  CONSULTING PHYSICIAN:DR. BENNETT   ADMIT DATE:5/26/2022 PATIENT PCP:Patrick Moser MD     IMPRESSION & PLAN:     esrd- home hd- f/b [facility] nephrologist   Hypokalemia   abdopain   Vomiting   H/o gastric ca   H/o GI bleed   Anemia of ckd   Pl. Effusion   Hypertension   Sec. hyperpara  ·   PLAN-  · No hd today  · kcl iv and po  · Plan for hd mwf  · epogen mwf  · Continue calcitriol     · Active Problems:  ·   Hypokalemia (5/26/2022)  ·   ·     Subjective:   HPI: Ross Hamm is a 42 y.o.  male with h/o ESRD. The patient is followed by  . @ [facility]. He is on home hemodialysis. He is found to have severe hypokalemia. His cr 2.7 with bun 18, cr 5.09  He has left arm avf  He denies any fever  His CT ABDO showed. -Moderate right-sided pleural effusion with basilar atelectasis, cardiomegaly andcoronary artery disease. --Fecal stasis extensive.         Past Medical History:   Diagnosis Date    Abdominal hematoma     AICD (automatic cardioverter/defibrillator) present     CAD (coronary artery disease)     anterior MI s/p 2 stents  2007    Chronic abdominal pain     Chronic kidney disease     ESRD; Dialysis dependent.  [facility] home x5 days week M-F does labs    DVT (deep venous thrombosis) ([address] 2001    DVT of popliteal vein ([address] 11/2011    not anticoagulated due to bleeding, IVC filter    Endocarditis     Gallstone pancreatitis     Gastrointestinal disorder     acid reflux    Gastrointestinal disorder     peptic ulcer    Hemodialysis patient ([address]     High cholesterol     HIT (heparin-induced thrombocytopenia) ([address]     Hypertension     Kidney transplant     b/l kidneys 1995, 2001    Long term current use of anticoagulant therapy     Nephrotic syndrome     Other ill-defined conditions(799.89)     kidny transplant x2,  on dialysis    Other ill-defined conditions(799.89)     home dialysis    Other ill-defined conditions(799.89)     hx recurrent left leg DVT    Peritonitis ([address]     Seizures ([facility] 2015    most recent- only had three in lifetime    Small bowel obstruction (HCC)     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[facility]     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    V-tach ([facility]       Past Surgical History:   Procedure Laterality Date    HX APPENDECTOMY      HX CHOLECYSTECTOMY      HX OTHER SURGICAL  11/2011    exploratory laparotomy    HX OTHER SURGICAL      fem-pop    HX OTHER SURGICAL  02/2013    right upper extremity fistula    HX PACEMAKER Left 6/2009    AICD-st latonya-not connected    HX PACEMAKER Left     AICD-left side-BS-working    HX SMALL BOWEL RESECTION      HX TRANSPLANT  2001     Kidney    HX TRANSPLANT  1992    kidney    HX VASCULAR ACCESS Right     femoral access for HD- does 5 day dialysis @ home/left upper arm graft active, right upper arm occluded    IR INSERT NON TUNL CVC OVER 5 YRS  12/7/2021    IR INSERT NON TUNL CVC OVER 5 YRS  12/10/2021    IR INSERT TIPS HEPATIC SHUNT  12/30/2021    IR REMOVE TUNL CVAD W/O PORT / PUMP  10/29/2020    IR REPLACE CVC TUNNELED W/O PORT  9/10/2020    IN CARDIAC SURG PROCEDURE UNLIST  2007    2 stents    IN EDG US EXAM SURGICAL ALTER STOM DUODENUM/JEJUNUM  7/15/2011         IN ESOPHAGOGASTRODUODENOSCOPY TRANSORAL DIAGNOSTIC  5/24/2012         UPPER GI ENDOSCOPY,CTRL BLEED  12/6/2021         UPPER GI ENDOSCOPY,DIAGNOSIS  12/8/2021         UPPER GI ENDOSCOPY,DIAGNOSIS  4/20/2022         VASCULAR SURGERY PROCEDURE UNLIST  11/14/2017    Insertion AV graft right upper arm (bovine); ligation of AV fistula right arm      Prior to Admission medications    Medication Sig Start Date End Date Taking?  Authorizing Provider   sevelamer carbonate (Renvela) 800 mg tab tab Take 1,600 mg by mouth three (3) times daily. Provider, Historical   ondansetron hcl (Zofran) 4 mg tablet Take 1 Tablet by mouth every twelve (12) hours as needed for Nausea or Vomiting. 4/6/22   Piter Keyes MD   dicyclomine (BENTYL) 20 mg tablet Take 1 Tablet by mouth every six (6) hours as needed for Abdominal Cramps. 4/6/22   Piter Keyes MD   metoprolol succinate (TOPROL-XL) 25 mg XL tablet Take 12.5 mg by mouth daily. 1/26/22   Provider, Historical   pantoprazole (PROTONIX) 40 mg tablet Take 1 Tablet by mouth Before breakfast and dinner. 1/5/22   Renuka Medina NP   sucralfate (CARAFATE) 1 gram tablet Take 1 Tablet by mouth Before breakfast, lunch, dinner and at bedtime. 1/5/22   Renuka Medina NP   polyethylene glycol (MIRALAX) 17 gram packet Take 1 Packet by mouth daily. 1/5/22   Daisy Holt MD   propranoloL (INDERAL) 10 mg tablet Take 10 mg by mouth two (2) times a day. Patient not taking: Reported on 4/18/2022 12/24/21   Provider, Historical   Vitamin D2 1,250 mcg (50,000 unit) capsule Take 50,000 Units by mouth every seven (7) days. 10/15/21   Provider, Historical   calcium acetate,phosphat bind, (PHOSLO) 667 mg cap Take 2 Caps by mouth three (3) times daily (with meals). Indications: low amount of calcium in the blood, renal osteodystrophy with hyperphosphatemia  Patient not taking: Reported on 4/18/2022 11/10/20   Virgilio Armstrong MD   atorvastatin (Lipitor) 20 mg tablet Take 20 mg by mouth daily. Provider, Historical   acetaminophen (TYLENOL) 500 mg tablet Take 1 Tab by mouth every four (4) hours as needed for Pain. Over the counter 1/20/19   Zaid Manley MD   calcitRIOL (ROCALTROL) 0.5 mcg capsule Take 0.5 mcg by mouth daily.     Provider, Historical     Allergies   Allergen Reactions    Shellfish Containing Products Swelling     Break out in rash, trouble breathing    Contrast Agent [Iodine] Shortness of Breath    Heparin Analogues Other (comments)     Positive history of HIT      Social History     Tobacco Use    Smoking status: Never Smoker    Smokeless tobacco: Never Used   Substance Use Topics    Alcohol use: No      Family History   Problem Relation Age of Onset    COPD Mother     Lung Disease Mother     Cancer Father         stomach    Cancer Maternal Grandmother      Review of Systems:  A 11 point review of system was performed today. Pertinent positives and negatives are mentioned in the HPI. The reminder of the ROS is negative and noncontributory. Objective:    Vitals:    Vitals:    05/26/22 0945 05/26/22 1000 05/26/22 1015 05/26/22 1045   BP: (!) 106/58 (!) 98/42 (!) 97/59 (!) 121/58   Pulse: 92 98 90 90   Resp: 20 25 21 29   Temp:       SpO2: 100% 100% 100% 97%   Weight:       Height:         I&O's:  No intake/output data recorded. Physical Exam:  General:Alert, No distress,   Eyes:No scleral icterus, No conjunctival pallor  Neck:Supple,no mass palpable,no thyromegaly  Lungs:Clears to auscultation Bilaterally, normal respiratory effort  CVS:RRR, S1 S2 normal,  No rub,  Abdomen:Soft, Non tender, No hepatosplenomegaly  Extremities: traceLE edema  Skin:No rash or lesions, Warm and DRY   : no oneal  Musculoskeletal : no joint pain, no joint tenderness  Psych: AAO X 3, appropriate affect   NEURO: non focal , normal speech   Dialysis Access:  Left arm avg +    Care Plan discussed with:  Patient, er nurse     Chart reviewed.   ECG[de-identified] Rev:yes  Xray/CT/US/MRI REV:yes    Lab Data Personally Reviewed: (see below)    Recent Labs     05/26/22  0645   WBC 8.4   HGB 7.8*      ANEU 5.5   *   K 2.7*   GLU 83   BUN 18   CREA 5.09*   ALT <6*   TBILI 0.7   *   CA 9.5     Lab Results   Component Value Date/Time    Specimen Description: NARES 05/30/2013 05:53 PM    Specimen Description: URINE 11/16/2012 05:15 PM    Specimen Description: URINE 09/17/2012 01:35 AM     Lab Results   Component Value Date/Time    Culture result: NO GROWTH 6 DAYS 01/08/2022 10:49 AM    Culture result: NO GROWTH 6 DAYS 01/08/2022 06:04 AM    Culture result: FEW YEAST, (APPARENT CANDIDA ALBICANS) (A) 12/07/2021 05:41 PM    Culture result: NO NORMAL RESPIRATORY REGI ISOLATED 12/07/2021 05:41 PM     Lab Results   Component Value Date/Time    Iron 57 11/22/2018 05:27 AM    TIBC 234 (L) 11/22/2018 05:27 AM    Iron % saturation 24 11/22/2018 05:27 AM    Ferritin 1,339 (H) 12/06/2021 09:39 AM     Prior to Admission medications    Medication Sig Start Date End Date Taking? Authorizing Provider   sevelamer carbonate (Renvela) 800 mg tab tab Take 1,600 mg by mouth three (3) times daily. Provider, Historical   ondansetron hcl (Zofran) 4 mg tablet Take 1 Tablet by mouth every twelve (12) hours as needed for Nausea or Vomiting. 4/6/22   Susana Uribe MD   dicyclomine (BENTYL) 20 mg tablet Take 1 Tablet by mouth every six (6) hours as needed for Abdominal Cramps. 4/6/22   Susana Uribe MD   metoprolol succinate (TOPROL-XL) 25 mg XL tablet Take 12.5 mg by mouth daily. 1/26/22   Provider, Historical   pantoprazole (PROTONIX) 40 mg tablet Take 1 Tablet by mouth Before breakfast and dinner. 1/5/22   Renuka Medina NP   sucralfate (CARAFATE) 1 gram tablet Take 1 Tablet by mouth Before breakfast, lunch, dinner and at bedtime. 1/5/22   Renuka Medina NP   polyethylene glycol (MIRALAX) 17 gram packet Take 1 Packet by mouth daily. 1/5/22   Danny Odom MD   propranoloL (INDERAL) 10 mg tablet Take 10 mg by mouth two (2) times a day. Patient not taking: Reported on 4/18/2022 12/24/21   Provider, Historical   Vitamin D2 1,250 mcg (50,000 unit) capsule Take 50,000 Units by mouth every seven (7) days. 10/15/21   Provider, Historical   calcium acetate,phosphat bind, (PHOSLO) 667 mg cap Take 2 Caps by mouth three (3) times daily (with meals).  Indications: low amount of calcium in the blood, renal osteodystrophy with hyperphosphatemia  Patient not taking: Reported on 4/18/2022 11/10/20   Lauren Cuellar MD   atorvastatin (Lipitor) 20 mg tablet Take 20 mg by mouth daily. Provider, Historical   acetaminophen (TYLENOL) 500 mg tablet Take 1 Tab by mouth every four (4) hours as needed for Pain. Over the counter 1/20/19   Elaine Clemens MD   calcitRIOL (ROCALTROL) 0.5 mcg capsule Take 0.5 mcg by mouth daily. Provider, Historical     Medications list Personally Reviewed   [x]          Yes     []               No    Prior to Admission Medications   Prescriptions Last Dose Informant Patient Reported? Taking? Vitamin D2 1,250 mcg (50,000 unit) capsule   Yes No   Sig: Take 50,000 Units by mouth every seven (7) days. acetaminophen (TYLENOL) 500 mg tablet  Self No No   Sig: Take 1 Tab by mouth every four (4) hours as needed for Pain. Over the counter   atorvastatin (Lipitor) 20 mg tablet  Self Yes No   Sig: Take 20 mg by mouth daily. calcitRIOL (ROCALTROL) 0.5 mcg capsule  Self Yes No   Sig: Take 0.5 mcg by mouth daily. calcium acetate,phosphat bind, (PHOSLO) 667 mg cap   No No   Sig: Take 2 Caps by mouth three (3) times daily (with meals). Indications: low amount of calcium in the blood, renal osteodystrophy with hyperphosphatemia   Patient not taking: Reported on 4/18/2022   dicyclomine (BENTYL) 20 mg tablet   No No   Sig: Take 1 Tablet by mouth every six (6) hours as needed for Abdominal Cramps. metoprolol succinate (TOPROL-XL) 25 mg XL tablet   Yes No   Sig: Take 12.5 mg by mouth daily. ondansetron hcl (Zofran) 4 mg tablet   No No   Sig: Take 1 Tablet by mouth every twelve (12) hours as needed for Nausea or Vomiting. pantoprazole (PROTONIX) 40 mg tablet   No No   Sig: Take 1 Tablet by mouth Before breakfast and dinner. polyethylene glycol (MIRALAX) 17 gram packet   No No   Sig: Take 1 Packet by mouth daily. propranoloL (INDERAL) 10 mg tablet   Yes No   Sig: Take 10 mg by mouth two (2) times a day.    Patient not taking: Reported on 4/18/2022   sevelamer carbonate (Renvela) 800 mg tab tab   Yes No   Sig: Take 1,600 mg by mouth three (3) times daily. sucralfate (CARAFATE) 1 gram tablet   No No   Sig: Take 1 Tablet by mouth Before breakfast, lunch, dinner and at bedtime. Facility-Administered Medications: None       Thank you for allowing us to participate in the care this patient. We will follow patient with you. Kiah Medina [facility]  [facility] [place]  [facility], [address], [address]  Phone - [phone]         Fax - [phone] [facility] Office  [address] [place] [address]  Phone - [phone]        Fax - [phone]     [email or web address] [email or web address]

## 2022-05-27 VITALS
HEART RATE: 86 BPM | WEIGHT: 131.61 LBS | OXYGEN SATURATION: 99 % | SYSTOLIC BLOOD PRESSURE: 180 MMHG | TEMPERATURE: 98.3 F | HEIGHT: 67 IN | BODY MASS INDEX: 20.66 KG/M2 | RESPIRATION RATE: 18 BRPM | DIASTOLIC BLOOD PRESSURE: 58 MMHG

## 2022-05-27 LAB
ANION GAP SERPL CALC-SCNC: 7 MMOL/L (ref 5–15)
BUN SERPL-MCNC: 29 MG/DL (ref 6–20)
BUN/CREAT SERPL: 4 (ref 12–20)
CALCIUM SERPL-MCNC: 9 MG/DL (ref 8.5–10.1)
CHLORIDE SERPL-SCNC: 97 MMOL/L (ref 97–108)
CO2 SERPL-SCNC: 29 MMOL/L (ref 21–32)
CREAT SERPL-MCNC: 6.97 MG/DL (ref 0.7–1.3)
ERYTHROCYTE [DISTWIDTH] IN BLOOD BY AUTOMATED COUNT: 15.2 % (ref 11.5–14.5)
GLUCOSE SERPL-MCNC: 86 MG/DL (ref 65–100)
HCT VFR BLD AUTO: 22.6 % (ref 36.6–50.3)
HGB BLD-MCNC: 7.2 G/DL (ref 12.1–17)
MCH RBC QN AUTO: 29.3 PG (ref 26–34)
MCHC RBC AUTO-ENTMCNC: 31.9 G/DL (ref 30–36.5)
MCV RBC AUTO: 91.9 FL (ref 80–99)
NRBC # BLD: 0 K/UL (ref 0–0.01)
NRBC BLD-RTO: 0 PER 100 WBC
PLATELET # BLD AUTO: 199 K/UL (ref 150–400)
PMV BLD AUTO: 10.1 FL (ref 8.9–12.9)
POTASSIUM SERPL-SCNC: 3.7 MMOL/L (ref 3.5–5.1)
RBC # BLD AUTO: 2.46 M/UL (ref 4.1–5.7)
SODIUM SERPL-SCNC: 133 MMOL/L (ref 136–145)
WBC # BLD AUTO: 6.6 K/UL (ref 4.1–11.1)

## 2022-05-27 PROCEDURE — 80048 BASIC METABOLIC PNL TOTAL CA: CPT

## 2022-05-27 PROCEDURE — 74011000250 HC RX REV CODE- 250: Performed by: INTERNAL MEDICINE

## 2022-05-27 PROCEDURE — 74011250637 HC RX REV CODE- 250/637: Performed by: INTERNAL MEDICINE

## 2022-05-27 PROCEDURE — 74011250636 HC RX REV CODE- 250/636: Performed by: INTERNAL MEDICINE

## 2022-05-27 PROCEDURE — 85027 COMPLETE CBC AUTOMATED: CPT

## 2022-05-27 PROCEDURE — 90935 HEMODIALYSIS ONE EVALUATION: CPT

## 2022-05-27 PROCEDURE — 96376 TX/PRO/DX INJ SAME DRUG ADON: CPT

## 2022-05-27 PROCEDURE — G0257 UNSCHED DIALYSIS ESRD PT HOS: HCPCS

## 2022-05-27 PROCEDURE — G0378 HOSPITAL OBSERVATION PER HR: HCPCS

## 2022-05-27 PROCEDURE — 36415 COLL VENOUS BLD VENIPUNCTURE: CPT

## 2022-05-27 RX ADMIN — MORPHINE SULFATE 1 MG: 2 INJECTION, SOLUTION INTRAMUSCULAR; INTRAVENOUS at 06:07

## 2022-05-27 RX ADMIN — MORPHINE SULFATE 1 MG: 2 INJECTION, SOLUTION INTRAMUSCULAR; INTRAVENOUS at 12:04

## 2022-05-27 RX ADMIN — MORPHINE SULFATE 1 MG: 2 INJECTION, SOLUTION INTRAMUSCULAR; INTRAVENOUS at 02:08

## 2022-05-27 RX ADMIN — SODIUM CHLORIDE, PRESERVATIVE FREE 10 ML: 5 INJECTION INTRAVENOUS at 12:07

## 2022-05-27 RX ADMIN — SODIUM CHLORIDE, PRESERVATIVE FREE 10 ML: 5 INJECTION INTRAVENOUS at 13:13

## 2022-05-27 RX ADMIN — SUCRALFATE 1 G: 1 TABLET ORAL at 12:03

## 2022-05-27 NOTE — PROGRESS NOTES
Nephrology Progress Note  Blaise Leblanc     [facility]. [facility]  Phone - [phone]   Patient: Ross Hamm    YOB: 1975        Date- 5/27/2022   Admit Date: 5/26/2022  CC: Follow up for ESRD          IMPRESSION & PLAN:   · ESRD, home hd- f/b [facility] nephrologist, Dr. Gretel Arriaga  · Hypokalemia  · abdopain  · Vomiting  · H/o gastric ca  · H/o GI bleed  · Anemia of ckd  · Pl. Effusion  · Hypertension   Sec. hyperpara    PLAN-   Plan for hemodialysis today.  Change bath to 4K.  Next HD schedule Monday.  Continue Epogen for anemia. Subjective: Interval History:   -Seen and examined on HD this morning.  -Potassium getting better. Objective:   Vitals:    05/27/22 1000 05/27/22 1015 05/27/22 1030 05/27/22 1045   BP: (!) 118/33 119/62 (!) 145/58 (!) 160/56   Pulse: 78 89 89 87   Resp: 18 18 18 18   Temp:       TempSrc:       SpO2:       Weight:       Height:          05/26 0701 - 05/27 0700  In: 500 [P.O.:250; I.V.:250]  Out: 400 [Urine:400]  Last 3 Recorded Weights in this Encounter    05/26/22 0247   Weight: 59.7 kg (131 lb 9.8 oz)      Physical exam:    GEN: NAD  NECK- Supple, no mass  RESP: No wheezing, Clear b/l  CVS: S1,S2  RRR  NEURO: Normal speech, Non focal  EXT: No Edema   PSYCH: Normal Mood    Chart reviewed. Pertinent Notes reviewed. Data Review :  Recent Labs     05/27/22  0506 05/26/22  0645   * 135*   K 3.7 2.7*   CL 97 96*   CO2 29 31   BUN 29* 18   CREA 6.97* 5.09*   GLU 86 83   CA 9.0 9.5     Recent Labs     05/27/22  0506 05/26/22  0645   WBC 6.6 8.4   HGB 7.2* 7.8*   HCT 22.6* 24.6*    193     No results for input(s): FE, TIBC, PSAT, FERR in the last 72 hours.    Medication list  reviewed  Current Facility-Administered Medications   Medication Dose Route Frequency    ondansetron (ZOFRAN) injection 4 mg  4 mg IntraVENous Q4H PRN    atorvastatin (LIPITOR) tablet 20 mg  20 mg Oral DAILY    calcitRIOL (ROCALTROL) capsule 0.5 mcg 0.5 mcg Oral DAILY    dicyclomine (BENTYL) tablet 20 mg  20 mg Oral Q6H PRN    metoprolol succinate (TOPROL-XL) XL tablet 12.5 mg  12.5 mg Oral DAILY    pantoprazole (PROTONIX) tablet 40 mg  40 mg Oral ACB&D    sevelamer carbonate (RENVELA) tab 1,600 mg  1,600 mg Oral TID    sucralfate (CARAFATE) tablet 1 g  1 g Oral AC&HS    sodium chloride (NS) flush 5-40 mL  5-40 mL IntraVENous Q8H    sodium chloride (NS) flush 5-40 mL  5-40 mL IntraVENous PRN    acetaminophen (TYLENOL) tablet 650 mg  650 mg Oral Q6H PRN    Or    acetaminophen (TYLENOL) suppository 650 mg  650 mg Rectal Q6H PRN    polyethylene glycol (MIRALAX) packet 17 g  17 g Oral DAILY PRN    bisacodyL (DULCOLAX) suppository 10 mg  10 mg Rectal DAILY PRN    morphine injection 1 mg  1 mg IntraVENous Q4H PRN    epoetin emilie-epbx (RETACRIT) injection 20,000 Units  20,000 Units SubCUTAneous Q MON, WED & FRI          Hui Mahoney MD              [facility]  [facility], [address]  [place], [address]  Phone - [phone]               Fax - [phone]

## 2022-05-27 NOTE — PROGRESS NOTES
Discharge medications reviewed with patient and appropriate educational materials and side effects teaching were provided. IV removed, pt wheeled to ED entrance to his car to drive home.

## 2022-05-27 NOTE — PROGRESS NOTES
Problem: Falls - Risk of  Goal: *Absence of Falls  Description: Document Aidee Chou Fall Risk and appropriate interventions in the flowsheet.   Outcome: Resolved/Met  Note: Fall Risk Interventions:            Medication Interventions: Patient to call before getting OOB,Teach patient to arise slowly                   Problem: Patient Education: Go to Patient Education Activity  Goal: Patient/Family Education  Outcome: Resolved/Met

## 2022-05-27 NOTE — PROGRESS NOTES
End of Shift Note    Bedside shift change report given to Renan Nicole  (oncoming nurse) by Mary Jacobsen RN (offgoing nurse). Report included the following information SBAR    Shift worked:  day      Shift summary and any significant changes:     patient has had no complaints of nausea but was medicated for pain. He does not void. No bowelmovment     Concerns for physician to address:  see above     Zone phone for oncoming shift:   na        Activity:  Activity Level: Up with Assistance  Number times ambulated in hallways past shift: 0  Number of times OOB to chair past shift: 0    Cardiac:   Cardiac Monitoring: Yes      Cardiac Rhythm: Sinus Rhythm    Access:   Current line(s): PIV     Genitourinary:   Urinary status: anuric    Respiratory:   O2 Device: None (Room air)  Chronic home O2 use?: NO  Incentive spirometer at bedside: NO       GI:  Last Bowel Movement Date: 05/25/22  Current diet:  DIET ONE TIME MESSAGE  ADULT DIET Regular; No Salt Added (3-4 gm)  Passing flatus: NO  Tolerating current diet: YES       Pain Management:   Patient states pain is manageable on current regimen: NO    Skin:  Yong Score: 18  Interventions: increase time out of bed    Patient Safety:  Fall Score:  Total Score: 1  Interventions: pt to call before getting OOB       Length of Stay:  Expected LOS: - - -  Actual LOS: 0      Mary Jacobsen RN

## 2022-05-27 NOTE — PROCEDURES
Hemodialysis / [phone]    Vitals Pre Post Assessment Pre Post   BP BP: (!) 163/70 (05/27/22 0749) 146/33 LOC A&Ox4 A&Ox4   HR Pulse (Heart Rate): 93 (05/27/22 0749) 84 Lungs Clear/diminished bases Clear/diminished bases   Resp Resp Rate: 18 (05/27/22 0749) 18 Cardiac NSR NSR   Temp Temp: 98.3 °F (36.8 °C) (05/27/22 0749) 98.1 Skin intact intact   Weight  n/a n/a Edema 1+ BLE Trace BLE   Tele status remote remote Pain 0 0     Orders   Duration: Start: 1168 End: 8284 Total: 3.5 hours   Dialyzer: Dialyzer/Set Up Inspection: [place] (05/27/22 0749)   [place] Mean Bath: Dialysate K (mEq/L): 3 (05/27/22 0749)   Ca Bath: Dialysate CA (mEq/L): 2.5 (05/27/22 0749)   Na: Dialysate NA (mEq/L): 140 (05/27/22 0749)   Bicarb: Dialysate HCO3 (mEq/L): 38 (05/27/22 0749)   Target Fluid Removal: Goal/Amount of Fluid to Remove (mL): 1500 mL (05/27/22 0749)     Access   Type & Location: UA AVG   Comments: Skin CDI. No s/s of infection. + B/T. No issues with cannulation or hemostasis. Running well at .                                      Labs   HBsAg (Antigen) / date: Negative 01/7/22                                              HBsAb (Antibody) / date: Immune 01/7/22   Source: Epic   Obtained/Reviewed  Critical Results Called HGB   Date Value Ref Range Status   05/27/2022 7.2 (L) 12.1 - 17.0 g/dL Final     Potassium   Date Value Ref Range Status   05/27/2022 3.7 3.5 - 5.1 mmol/L Final     Comment:     INVESTIGATED PER DELTA CHECK PROTOCOL     Calcium   Date Value Ref Range Status   05/27/2022 9.0 8.5 - 10.1 MG/DL Final     BUN   Date Value Ref Range Status   05/27/2022 29 (H) 6 - 20 MG/DL Final     Creatinine   Date Value Ref Range Status   05/27/2022 6.97 (H) 0.70 - 1.30 MG/DL Final     Comment:     INVESTIGATED PER DELTA CHECK PROTOCOL        Meds Given   Name Dose Route   none                 Adequacy / Fluid    Total Liters Process: 79.9L   Net Fluid Removed: 2000 ml      Comments   Time Out Done:   (Time) 4829   Admitting Diagnosis: Abd pain, hypokalemia   Consent obtained/signed: Informed Consent Verified: Yes (05/27/22 3025)   Machine / RO # Machine Number: [id number] (05/27/22 5589)   Primary Nurse Rpt Pre: Sebastien Boyd RN   Primary Nurse Rpt Post: Cami Saleh RN   Pt Education: ongoing   Care Plan: Consent and procedure   Pts outpatient clinic: Home hemo- [facility]     Tx Summary   Comments: Pt arrived to HD suite A&Ox4. Consent signed & on file. SBAR received from Primary RN. 6618: Pt cannulated with 78N needles per policy & without issue. VSS. Dialysis Tx initiated. **No Events**  1122: Tx ended. VSS. All possible blood returned to patient. Hemostasis achieved without issue. Bed locked and in the lowest position, call bell and belongings in reach. SBAR given to Primary, RN. Patient is stable at time of their departure. All Dialysis related medications have been reviewed.

## 2022-05-27 NOTE — DISCHARGE SUMMARY
Hospitalist Discharge Summary     Patient ID:  Edmar Hernandez  [id number]  63 y.o.  1975 5/26/2022    PCP on record: Tricia Lockett MD    Admit date: 5/26/2022  Discharge date and time: 5/27/2022    DISCHARGE DIAGNOSIS:  Abdominal pain with nausea and vomiting most likely gastroenteritis  Hypokalemia  H/o  of gastric varices /ulcer   End-stage renal disease on hemodialysis at home  Hypertension  CAD  Systolic CHF s/post AICD    CONSULTATIONS:  IP CONSULT TO NEPHROLOGY    Excerpted HPI from H&P of Maximilian Andrade MD:  CHIEF COMPLAINT: Nausea vomiting abdominal pain     HISTORY OF PRESENT ILLNESS:     Shyla Lawson is a 42 y.o.  male past medical history of gastric ulcer, CAD, end-stage renal disease on home hemodialysis, hypertension systolic CHF status post AICD who presents with complaint of abdominal pain associated with nausea and vomiting of nonbloody emesis . Patient denies any constipation, chest pain, shortness of breath. He also denies any fever chills. In the  ED, his vital signs were stable, his labs revealed hemoglobin 7.8, BMP revealed potassium 2.7, elevated alk phos. CT  abdomen and pelvis without contrast showed: Moderate right-sided pleural effusion with basilar atelectasis, cardiomegaly and  coronary artery disease.   Fecal stasis extensive.   Other incidental findings are stable described above. Juan M Xaiv and/or nonemergent findings are as described in detail above. We were asked to admit for work up and evaluation of the above problems.        ______________________________________________________________________  DISCHARGE SUMMARY/HOSPITAL COURSE:  for full details see H&P, daily progress notes, labs, consult notes.    Abdominal pain with nausea and vomiting most likely gastroenteritis  Hypokalemia  H/o  of gastric varices /ulcer leading to hemorrhagic shock   Admit under observation, as needed Zofran, potassium repleted by the ED physician, patient also given magnesium. Patient was able to tolerate diet on discharge  C/w  with home dose of PPI  CT scan showed  Moderate right-sided pleural effusion with basilar atelectasis, cardiomegaly and  coronary artery disease.   Fecal stasis extensive.   Other incidental findings are stable described above. .    Incidental and/or nonemergent findings are as described in detail above. As needed Dulcolax ordered, continue with MiraLAX     End-stage renal disease on hemodialysis at home  Hypertension  Consult nephrology for hemodialysis needs  Continue with home dose Toprol     CAD  Systolic CHF s/post AICD  Continue with home dose statin             _______________________________________________________________________  Patient seen and examined by me on discharge day. Pertinent Findings:  Gen:    Not in distress  Chest: Clear lungs  CVS:   Regular rhythm. No edema  Abd:  Soft, not distended, not tender  Neuro:  Alert, oriented x3  _______________________________________________________________________  DISCHARGE MEDICATIONS:   Current Discharge Medication List      CONTINUE these medications which have NOT CHANGED    Details   dicyclomine (BENTYL) 20 mg tablet Take 1 Tablet by mouth every six (6) hours as needed for Abdominal Cramps. Qty: 20 Tablet, Refills: 0      pantoprazole (PROTONIX) 40 mg tablet Take 1 Tablet by mouth Before breakfast and dinner. Qty: 60 Tablet, Refills: 2      sucralfate (CARAFATE) 1 gram tablet Take 1 Tablet by mouth Before breakfast, lunch, dinner and at bedtime. Qty: 120 Tablet, Refills: 2      polyethylene glycol (MIRALAX) 17 gram packet Take 1 Packet by mouth daily. Qty: 30 Packet, Refills: 0      Vitamin D2 1,250 mcg (50,000 unit) capsule Take 50,000 Units by mouth every seven (7) days. acetaminophen (TYLENOL) 500 mg tablet Take 1 Tab by mouth every four (4) hours as needed for Pain.  Over the counter  Qty: 30 Tab, Refills: 0      calcitRIOL (ROCALTROL) 0.5 mcg capsule Take 0.5 mcg by mouth daily. sevelamer carbonate (Renvela) 800 mg tab tab Take 1,600 mg by mouth three (3) times daily. ondansetron hcl (Zofran) 4 mg tablet Take 1 Tablet by mouth every twelve (12) hours as needed for Nausea or Vomiting. Qty: 20 Tablet, Refills: 0      metoprolol succinate (TOPROL-XL) 25 mg XL tablet Take 12.5 mg by mouth daily. propranoloL (INDERAL) 10 mg tablet Take 10 mg by mouth two (2) times a day. calcium acetate,phosphat bind, (PHOSLO) 667 mg cap Take 2 Caps by mouth three (3) times daily (with meals). Indications: low amount of calcium in the blood, renal osteodystrophy with hyperphosphatemia  Qty: 120 Cap, Refills: 0      atorvastatin (Lipitor) 20 mg tablet Take 20 mg by mouth daily. Patient Follow Up Instructions:    Activity: Activity as tolerated  Diet: Renal Diet  Wound Care: None needed        Follow-up Information     Follow up With Specialties Details Why Contact Info    Marcie Walker MD Family Medicine   [address] [place] [address]  [phone]          ________________________________________________________________    Risk of deterioration: Moderate    Condition at Discharge:  Stable  __________________________________________________________________    Disposition  Home with family, no needs    ____________________________________________________________________    Code Status: Full Code  ___________________________________________________________________      Total time in minutes spent coordinating this discharge (includes going over instructions, follow-up, prescriptions, and preparing report for sign off to her PCP) :  >30 minutes    Signed:  Hernesto Gibson MD

## 2022-05-27 NOTE — DISCHARGE INSTRUCTIONS
DISCHARGE DIAGNOSIS:  Abdominal pain with nausea and vomiting most likely gastroenteritis  Hypokalemia  H/o  of gastric varices /ulcer   End-stage renal disease on hemodialysis at home  Hypertension  CAD  Systolic CHF s/post AICD      MEDICATIONS:  · It is important that you take the medication exactly as they are prescribed. · Keep your medication in the bottles provided by the pharmacist and keep a list of the medication names, dosages, and times to be taken in your wallet. · Do not take other medications without consulting your doctor. Pain Management: per above medications    What to do at Home    Recommended diet:  Renal Diet    Recommended activity: Activity as tolerated    If you have questions regarding the hospital related prescriptions or hospital related issues please call [facility] [place] [address] at . You can always direct your questions to your primary care doctor if you are unable to reach your hospital physician; your PCP works as an extension of your hospital doctor just like your hospital doctor is an extension of your PCP for your time at the hospital [facility], [facility]).     If you experience any of the following symptoms then please call your primary care physician or return to the emergency room if you cannot get hold of your doctor:  Fever, chills, nausea, vomiting, diarrhea, change in mentation, falling, bleeding, shortness of breath

## 2022-05-27 NOTE — PROGRESS NOTES
Problem: Falls - Risk of  Goal: *Absence of Falls  Description: Document Dena Mcginnis Fall Risk and appropriate interventions in the flowsheet. Outcome: Resolved/Met  Note: Fall Risk Interventions:            Medication Interventions: Patient to call before getting OOB                   Problem: Patient Education: Go to Patient Education Activity  Goal: Patient/Family Education  Outcome: Resolved/Met     Problem: Pressure Injury - Risk of  Goal: *Prevention of pressure injury  Description: Document Yong Scale and appropriate interventions in the flowsheet. Outcome: Resolved/Met  Note: Pressure Injury Interventions:             Activity Interventions: Increase time out of bed    Mobility Interventions: HOB 30 degrees or less    Nutrition Interventions: Document food/fluid/supplement intake                     Problem: Patient Education: Go to Patient Education Activity  Goal: Patient/Family Education  Outcome: Resolved/Met

## 2022-08-07 ENCOUNTER — APPOINTMENT (OUTPATIENT)
Dept: CT IMAGING | Age: 47
End: 2022-08-07
Attending: STUDENT IN AN ORGANIZED HEALTH CARE EDUCATION/TRAINING PROGRAM
Payer: MEDICARE

## 2022-08-07 ENCOUNTER — HOSPITAL ENCOUNTER (EMERGENCY)
Age: 47
Discharge: HOME | End: 2022-08-07
Attending: STUDENT IN AN ORGANIZED HEALTH CARE EDUCATION/TRAINING PROGRAM
Payer: MEDICARE

## 2022-08-07 VITALS
HEART RATE: 80 BPM | OXYGEN SATURATION: 94 % | DIASTOLIC BLOOD PRESSURE: 80 MMHG | RESPIRATION RATE: 22 BRPM | SYSTOLIC BLOOD PRESSURE: 112 MMHG | TEMPERATURE: 98.7 F

## 2022-08-07 DIAGNOSIS — R10.32 ABDOMINAL PAIN, LLQ (LEFT LOWER QUADRANT): ICD-10-CM

## 2022-08-07 DIAGNOSIS — K59.00 CONSTIPATION, UNSPECIFIED CONSTIPATION TYPE: Primary | ICD-10-CM

## 2022-08-07 LAB
ALBUMIN SERPL-MCNC: 3 G/DL (ref 3.5–5)
ALBUMIN/GLOB SERPL: 0.5 {RATIO} (ref 1.1–2.2)
ALP SERPL-CCNC: 315 U/L (ref 45–117)
ALT SERPL-CCNC: 9 U/L (ref 12–78)
ANION GAP SERPL CALC-SCNC: 9 MMOL/L (ref 5–15)
AST SERPL-CCNC: 21 U/L (ref 15–37)
BASOPHILS # BLD: 0.1 K/UL (ref 0–0.1)
BASOPHILS NFR BLD: 1 % (ref 0–1)
BILIRUB SERPL-MCNC: 1.2 MG/DL (ref 0.2–1)
BUN SERPL-MCNC: 16 MG/DL (ref 6–20)
BUN/CREAT SERPL: 4 (ref 12–20)
CALCIUM SERPL-MCNC: 8.9 MG/DL (ref 8.5–10.1)
CHLORIDE SERPL-SCNC: 97 MMOL/L (ref 97–108)
CO2 SERPL-SCNC: 28 MMOL/L (ref 21–32)
COMMENT, HOLDF: NORMAL
CREAT SERPL-MCNC: 3.63 MG/DL (ref 0.7–1.3)
DIFFERENTIAL METHOD BLD: ABNORMAL
EOSINOPHIL # BLD: 0.2 K/UL (ref 0–0.4)
EOSINOPHIL NFR BLD: 2 % (ref 0–7)
ERYTHROCYTE [DISTWIDTH] IN BLOOD BY AUTOMATED COUNT: 19.3 % (ref 11.5–14.5)
GLOBULIN SER CALC-MCNC: 5.5 G/DL (ref 2–4)
GLUCOSE SERPL-MCNC: 108 MG/DL (ref 65–100)
HCT VFR BLD AUTO: 28.1 % (ref 36.6–50.3)
HGB BLD-MCNC: 9 G/DL (ref 12.1–17)
IMM GRANULOCYTES # BLD AUTO: 0 K/UL (ref 0–0.04)
IMM GRANULOCYTES NFR BLD AUTO: 0 % (ref 0–0.5)
LACTATE BLD-SCNC: 1.28 MMOL/L (ref 0.4–2)
LIPASE SERPL-CCNC: 189 U/L (ref 73–393)
LYMPHOCYTES # BLD: 1.4 K/UL (ref 0.8–3.5)
LYMPHOCYTES NFR BLD: 12 % (ref 12–49)
MCH RBC QN AUTO: 28.6 PG (ref 26–34)
MCHC RBC AUTO-ENTMCNC: 32 G/DL (ref 30–36.5)
MCV RBC AUTO: 89.2 FL (ref 80–99)
MONOCYTES # BLD: 1.2 K/UL (ref 0–1)
MONOCYTES NFR BLD: 10 % (ref 5–13)
NEUTS SEG # BLD: 8.5 K/UL (ref 1.8–8)
NEUTS SEG NFR BLD: 75 % (ref 32–75)
NRBC # BLD: 0 K/UL (ref 0–0.01)
NRBC BLD-RTO: 0 PER 100 WBC
PLATELET # BLD AUTO: 162 K/UL (ref 150–400)
PMV BLD AUTO: 9.8 FL (ref 8.9–12.9)
POTASSIUM SERPL-SCNC: 3.1 MMOL/L (ref 3.5–5.1)
PROT SERPL-MCNC: 8.5 G/DL (ref 6.4–8.2)
RBC # BLD AUTO: 3.15 M/UL (ref 4.1–5.7)
SAMPLES BEING HELD,HOLD: NORMAL
SODIUM SERPL-SCNC: 134 MMOL/L (ref 136–145)
WBC # BLD AUTO: 11.4 K/UL (ref 4.1–11.1)

## 2022-08-07 PROCEDURE — 83690 ASSAY OF LIPASE: CPT

## 2022-08-07 PROCEDURE — 96375 TX/PRO/DX INJ NEW DRUG ADDON: CPT

## 2022-08-07 PROCEDURE — 99284 EMERGENCY DEPT VISIT MOD MDM: CPT

## 2022-08-07 PROCEDURE — 85025 COMPLETE CBC W/AUTO DIFF WBC: CPT

## 2022-08-07 PROCEDURE — 83605 ASSAY OF LACTIC ACID: CPT

## 2022-08-07 PROCEDURE — 74176 CT ABD & PELVIS W/O CONTRAST: CPT

## 2022-08-07 PROCEDURE — 80053 COMPREHEN METABOLIC PANEL: CPT

## 2022-08-07 PROCEDURE — 36415 COLL VENOUS BLD VENIPUNCTURE: CPT

## 2022-08-07 PROCEDURE — 96374 THER/PROPH/DIAG INJ IV PUSH: CPT

## 2022-08-07 PROCEDURE — 74011250636 HC RX REV CODE- 250/636: Performed by: STUDENT IN AN ORGANIZED HEALTH CARE EDUCATION/TRAINING PROGRAM

## 2022-08-07 RX ORDER — ONDANSETRON 2 MG/ML
4 INJECTION INTRAMUSCULAR; INTRAVENOUS
Status: COMPLETED | OUTPATIENT
Start: 2022-08-07 | End: 2022-08-07

## 2022-08-07 RX ORDER — OXYCODONE AND ACETAMINOPHEN 5; 325 MG/1; MG/1
1 TABLET ORAL
Qty: 4 TABLET | Refills: 0 | Status: SHIPPED | OUTPATIENT
Start: 2022-08-07 | End: 2022-08-08

## 2022-08-07 RX ORDER — ONDANSETRON 4 MG/1
4 TABLET, ORALLY DISINTEGRATING ORAL
Qty: 6 TABLET | Refills: 0 | OUTPATIENT
Start: 2022-08-07 | End: 2022-08-16

## 2022-08-07 RX ORDER — FENTANYL CITRATE 50 UG/ML
50 INJECTION, SOLUTION INTRAMUSCULAR; INTRAVENOUS ONCE
Status: COMPLETED | OUTPATIENT
Start: 2022-08-07 | End: 2022-08-07

## 2022-08-07 RX ADMIN — ONDANSETRON 4 MG: 2 INJECTION INTRAMUSCULAR; INTRAVENOUS at 21:37

## 2022-08-07 RX ADMIN — FENTANYL CITRATE 50 MCG: 50 INJECTION, SOLUTION INTRAMUSCULAR; INTRAVENOUS at 21:38

## 2022-08-08 NOTE — ED PROVIDER NOTES
EMERGENCY DEPARTMENT HISTORY AND PHYSICAL EXAM      Date: 8/7/2022  Patient Name: Omega Mcbride    History of Presenting Illness     Chief Complaint   Patient presents with    Abdominal Pain     Pt states he has LUQ pain since 1am with nausea vomiting, he is a dialysis patient, at home hemo dialysis 5 days a week, last one was yesterday. Pt states he has hx of bowel resection pancreatitis , gallbaldder and appendix removal.        History Provided By: Patient    HPI: Omega Mcbride, 42 y.o. male with a past medical history significant for gastric ulcer, CAD, esrd on home hemodialysis, hypertension systolic CHF status post AICD who presents with complaint of abdominal pain associated with nausea and vomiting of nonbloody emesis. He notes his symptoms started at 0100 this AM and have been getting worse. Currently a 7/10 LLQ pain, sharp, not better or worse with anything. He did have a BM today which was normal.  Denies diarrhea. Denies CP, Sob, HA, LH, syncope, hematochezia. He has not taken anytjhing for it at home. SH: denies drug use    There are no other complaints, changes, or physical findings at this time. PCP: Rikki King MD    No current facility-administered medications on file prior to encounter. Current Outpatient Medications on File Prior to Encounter   Medication Sig Dispense Refill    sevelamer carbonate (Renvela) 800 mg tab tab Take 1,600 mg by mouth three (3) times daily. ondansetron hcl (Zofran) 4 mg tablet Take 1 Tablet by mouth every twelve (12) hours as needed for Nausea or Vomiting. 20 Tablet 0    dicyclomine (BENTYL) 20 mg tablet Take 1 Tablet by mouth every six (6) hours as needed for Abdominal Cramps. 20 Tablet 0    metoprolol succinate (TOPROL-XL) 25 mg XL tablet Take 12.5 mg by mouth daily. pantoprazole (PROTONIX) 40 mg tablet Take 1 Tablet by mouth Before breakfast and dinner.  60 Tablet 2    sucralfate (CARAFATE) 1 gram tablet Take 1 Tablet by mouth Before breakfast, lunch, dinner and at bedtime. 120 Tablet 2    polyethylene glycol (MIRALAX) 17 gram packet Take 1 Packet by mouth daily. 30 Packet 0    propranoloL (INDERAL) 10 mg tablet Take 10 mg by mouth two (2) times a day. (Patient not taking: Reported on 4/18/2022)      Vitamin D2 1,250 mcg (50,000 unit) capsule Take 50,000 Units by mouth every seven (7) days. calcium acetate,phosphat bind, (PHOSLO) 667 mg cap Take 2 Caps by mouth three (3) times daily (with meals). Indications: low amount of calcium in the blood, renal osteodystrophy with hyperphosphatemia (Patient not taking: Reported on 4/18/2022) 120 Cap 0    atorvastatin (Lipitor) 20 mg tablet Take 20 mg by mouth daily. acetaminophen (TYLENOL) 500 mg tablet Take 1 Tab by mouth every four (4) hours as needed for Pain. Over the counter 30 Tab 0    calcitRIOL (ROCALTROL) 0.5 mcg capsule Take 0.5 mcg by mouth daily. Past History     Past Medical History:  Past Medical History:   Diagnosis Date    Abdominal hematoma     AICD (automatic cardioverter/defibrillator) present     CAD (coronary artery disease)     anterior MI s/p 2 stents  2007    Chronic abdominal pain     Chronic kidney disease     ESRD; Dialysis dependent.  [facility] [address] home x5 days week M-F does labs    DVT (deep venous thrombosis) ([address] 2001    DVT of popliteal vein ([address] 11/2011    not anticoagulated due to bleeding, IVC filter    Endocarditis     Gallstone pancreatitis     Gastrointestinal disorder     acid reflux    Gastrointestinal disorder     peptic ulcer    Hemodialysis patient ([address]     High cholesterol     HIT (heparin-induced thrombocytopenia) ([address]     Hypertension     Kidney transplant     b/l kidneys 1995, 2001    Long term current use of anticoagulant therapy     Nephrotic syndrome     Other ill-defined conditions(799.89)     kidny transplant x2,  on dialysis    Other ill-defined conditions(799.89)     home dialysis    Other ill-defined conditions(799.89)     hx recurrent left leg DVT    Peritonitis (HCC)     Seizures ([facility] 2015    most recent- only had three in lifetime    Small bowel obstruction (HCC)     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[facility]     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    V-tach [facility])        Past Surgical History:  Past Surgical History:   Procedure Laterality Date    HX APPENDECTOMY      HX CHOLECYSTECTOMY      HX OTHER SURGICAL  11/2011    exploratory laparotomy    HX OTHER SURGICAL      fem-pop    HX OTHER SURGICAL  02/2013    right upper extremity fistula    HX PACEMAKER Left 6/2009    AICD-st latonya-not connected    HX PACEMAKER Left     AICD-left side-BS-working    HX SMALL BOWEL RESECTION      HX TRANSPLANT  2001     Kidney    HX TRANSPLANT  1992    kidney    HX VASCULAR ACCESS Right     femoral access for HD- does 5 day dialysis @ home/left upper arm graft active, right upper arm occluded    IR INSERT NON TUNL CVC OVER 5 YRS  12/7/2021    IR INSERT NON TUNL CVC OVER 5 YRS  12/10/2021    IR INSERT TIPS HEPATIC SHUNT  12/30/2021    IR REMOVE TUNL CVAD W/O PORT / PUMP  10/29/2020    IR REPLACE CVC TUNNELED W/O PORT  9/10/2020    OH CARDIAC SURG PROCEDURE UNLIST  2007    2 stents    OH EDG US EXAM SURGICAL ALTER STOM DUODENUM/JEJUNUM  7/15/2011         OH ESOPHAGOGASTRODUODENOSCOPY TRANSORAL DIAGNOSTIC  5/24/2012         UPPER GI ENDOSCOPY,CTRL BLEED  12/6/2021         UPPER GI ENDOSCOPY,DIAGNOSIS  12/8/2021         UPPER GI ENDOSCOPY,DIAGNOSIS  4/20/2022         VASCULAR SURGERY PROCEDURE UNLIST  11/14/2017    Insertion AV graft right upper arm (bovine); ligation of AV fistula right arm       Family History:  Family History   Problem Relation Age of Onset    COPD Mother     Lung Disease Mother     Cancer Father         stomach    Cancer Maternal Grandmother        Social History:  Social History     Tobacco Use    Smoking status: Never    Smokeless tobacco: Never   Vaping Use    Vaping Use: Never used   Substance Use Topics    Alcohol use: No    Drug use: Never       Allergies: Allergies   Allergen Reactions    Shellfish Containing Products Swelling     Break out in rash, trouble breathing    Contrast Agent [Iodine] Shortness of Breath    Heparin Analogues Other (comments)     Positive history of HIT         Review of Systems   Review of Systems   Constitutional:  Negative for activity change, chills and fever. HENT:  Negative for congestion and sore throat. Eyes:  Negative for visual disturbance. Respiratory:  Negative for chest tightness and shortness of breath. Cardiovascular:  Negative for chest pain and leg swelling. Gastrointestinal:  Positive for abdominal pain, nausea and vomiting. Negative for abdominal distention and constipation. Genitourinary:  Negative for decreased urine volume. Anuric   Musculoskeletal:  Negative for back pain. Skin:  Negative for rash. Neurological:  Negative for dizziness, weakness and headaches. Physical Exam   Physical Exam  Vitals reviewed. Constitutional:       General: He is not in acute distress. Appearance: He is well-developed. He is not ill-appearing. HENT:      Head: Normocephalic and atraumatic. Eyes:      Extraocular Movements: Extraocular movements intact. Pupils: Pupils are equal, round, and reactive to light. Comments: Mild scleral icterus   Cardiovascular:      Rate and Rhythm: Normal rate and regular rhythm. Heart sounds: Normal heart sounds. Pulmonary:      Effort: Pulmonary effort is normal.      Breath sounds: Normal breath sounds. Abdominal:      General: A surgical scar is present. Bowel sounds are normal. There is no distension. There are no signs of injury. Tenderness: There is abdominal tenderness in the left lower quadrant. There is no guarding or rebound. Skin:     General: Skin is warm and dry. Capillary Refill: Capillary refill takes less than 2 seconds. Neurological:      General: No focal deficit present.       Mental Status: He is alert and oriented to person, place, and time. Psychiatric:         Mood and Affect: Mood normal.         Behavior: Behavior normal.       Diagnostic Study Results     Labs -     Recent Results (from the past 24 hour(s))   CBC WITH AUTOMATED DIFF    Collection Time: 08/07/22  7:48 PM   Result Value Ref Range    WBC 11.4 (H) 4.1 - 11.1 K/uL    RBC 3.15 (L) 4.10 - 5.70 M/uL    HGB 9.0 (L) 12.1 - 17.0 g/dL    HCT 28.1 (L) 36.6 - 50.3 %    MCV 89.2 80.0 - 99.0 FL    MCH 28.6 26.0 - 34.0 PG    MCHC 32.0 30.0 - 36.5 g/dL    RDW 19.3 (H) 11.5 - 14.5 %    PLATELET 134 540 - 696 K/uL    MPV 9.8 8.9 - 12.9 FL    NRBC 0.0 0  WBC    ABSOLUTE NRBC 0.00 0.00 - 0.01 K/uL    NEUTROPHILS 75 32 - 75 %    LYMPHOCYTES 12 12 - 49 %    MONOCYTES 10 5 - 13 %    EOSINOPHILS 2 0 - 7 %    BASOPHILS 1 0 - 1 %    IMMATURE GRANULOCYTES 0 0.0 - 0.5 %    ABS. NEUTROPHILS 8.5 (H) 1.8 - 8.0 K/UL    ABS. LYMPHOCYTES 1.4 0.8 - 3.5 K/UL    ABS. MONOCYTES 1.2 (H) 0.0 - 1.0 K/UL    ABS. EOSINOPHILS 0.2 0.0 - 0.4 K/UL    ABS. BASOPHILS 0.1 0.0 - 0.1 K/UL    ABS. IMM. GRANS. 0.0 0.00 - 0.04 K/UL    DF AUTOMATED     METABOLIC PANEL, COMPREHENSIVE    Collection Time: 08/07/22  7:48 PM   Result Value Ref Range    Sodium 134 (L) 136 - 145 mmol/L    Potassium 3.1 (L) 3.5 - 5.1 mmol/L    Chloride 97 97 - 108 mmol/L    CO2 28 21 - 32 mmol/L    Anion gap 9 5 - 15 mmol/L    Glucose 108 (H) 65 - 100 mg/dL    BUN 16 6 - 20 MG/DL    Creatinine 3.63 (H) 0.70 - 1.30 MG/DL    BUN/Creatinine ratio 4 (L) 12 - 20      GFR est AA 22 (L) >60 ml/min/1.73m2    GFR est non-AA 18 (L) >60 ml/min/1.73m2    Calcium 8.9 8.5 - 10.1 MG/DL    Bilirubin, total 1.2 (H) 0.2 - 1.0 MG/DL    ALT (SGPT) 9 (L) 12 - 78 U/L    AST (SGOT) 21 15 - 37 U/L    Alk.  phosphatase 315 (H) 45 - 117 U/L    Protein, total 8.5 (H) 6.4 - 8.2 g/dL    Albumin 3.0 (L) 3.5 - 5.0 g/dL    Globulin 5.5 (H) 2.0 - 4.0 g/dL    A-G Ratio 0.5 (L) 1.1 - 2.2     LIPASE    Collection Time: 08/07/22  7:48 PM Result Value Ref Range    Lipase 189 73 - 393 U/L   SAMPLES BEING HELD    Collection Time: 08/07/22  7:48 PM   Result Value Ref Range    SAMPLES BEING HELD GREEN     COMMENT        Add-on orders for these samples will be processed based on acceptable specimen integrity and analyte stability, which may vary by analyte. POC LACTIC ACID    Collection Time: 08/07/22  9:37 PM   Result Value Ref Range    Lactic Acid (POC) 1.28 0.40 - 2.00 mmol/L       Radiologic Studies -   CT ABD PELV WO CONT   Final Result   No new acute abnormality in the abdomen or pelvis. Stable large amount of   colonic stool without bowel obstruction. Additional chronic/incidental findings   are unchanged as above. CT Results  (Last 48 hours)                 08/07/22 2118  CT ABD PELV WO CONT Final result    Impression:  No new acute abnormality in the abdomen or pelvis. Stable large amount of   colonic stool without bowel obstruction. Additional chronic/incidental findings   are unchanged as above. Narrative:  EXAM:  CT ABD PELV WO CONT       INDICATION: Left lower quadrant pain. COMPARISON: CT 5/26/2022. TECHNIQUE: Helical CT of the abdomen  and pelvis  without contrast. Coronal and   sagittal reformats are performed. CT dose reduction was achieved through use of   a standardized protocol tailored for this examination and automatic exposure   control for dose modulation. FINDINGS:    Solid organ evaluation is limited without contrast.        The visualized lung bases demonstrate a stable chronic right pleural effusion   and right basilar opacity. The left lower lung and pleural space are clear. The   heart size is stable. The atrophic native kidneys are unchanged. Small calcified transplant kidneys in   the pelvis are unchanged. There is a stable enlarged liver with nodular border.  The spleen, pancreas, and   adrenal glands are normal.  The gall bladder is present  without intra- or extra-hepatic biliary dilatation. A peripherally calcified mesenteric lesion is unchanged. There is a stable large   amount of colonic stool. There are no dilated bowel loops. The appendix is   surgically absent. There are no enlarged lymph nodes. There is no free fluid or free air. The   aorta tapers without aneurysm. There is aortoiliac atherosclerosis. The urinary bladder is unremarkable. There is no pelvic mass. The prostate is   not enlarged. Renal osteodystrophy is again noted. There is no aggressive bony lesion. CXR Results  (Last 48 hours)      None              Medical Decision Making   I am the first provider for this patient. I reviewed the vital signs, available nursing notes, past medical history, past surgical history, family history and social history. Vital Signs-Reviewed the patient's vital signs. Patient Vitals for the past 12 hrs:   Temp Pulse Resp BP SpO2   08/07/22 2216 -- 80 22 112/80 94 %   08/07/22 2201 -- 83 25 115/86 94 %   08/07/22 2146 -- 83 17 118/81 94 %   08/07/22 2131 -- 81 25 120/81 97 %   08/07/22 2101 -- 81 23 118/79 96 %   08/07/22 2031 -- 86 23 116/77 92 %   08/07/22 2016 -- 87 (!) 33 108/75 95 %   08/07/22 1931 98.7 °F (37.1 °C) 95 18 124/77 99 %       Records Reviewed: Nursing records and medical records reviewed    MDM:  Ddx includes diverticulitis, colitis, abscess, perforated viscous, constipation, cramping     Provider Notes (Medical Decision Making):   45yoM with PMH of gastric ulcer, CAD, end-stage renal disease on home hemodialysis, hypertension systolic CHF status post AICD who presents with complaint of abdominal pain associated with nausea and vomiting of nonbloody emesis. Vitals are stable on arrival, no signs of septic shock or decompensation. He appears generally well laying in bed, does have significant LLQ tenderness to palpation but is not peritoneal at this time. He does not make urine.   He has lots of significant abdominal surgeris and complex medical history. Labs initially mostly unremarkable. CT of the abdomen performed without contrast given his allergies with no acute pathology noted except for large stool burden. Patient given fentanyl and zofran and notes pain a bit better, nausea has resolved. Discussed bowel regimen and he has enemas at home he agrees to use. Discussed admission but patient ok with PO challenging and managing symptoms at home. OK for DC. ED Course:   Initial assessment performed. The patients presenting problems have been discussed, and they are in agreement with the care plan formulated and outlined with them. I have encouraged them to ask questions as they arise throughout their visit. Medications Administered       fentaNYL citrate (PF) injection 50 mcg       Admin Date  08/07/2022 Action  Given Dose  50 mcg Route  IntraVENous Administered By  Grant Orona RN              ondansetron [facility]) injection 4 mg       Admin Date  08/07/2022 Action  Given Dose  4 mg Route  IntraVENous Administered By  Grant Orona RN                        DISCHARGE PLAN:  1. Current Discharge Medication List        START taking these medications    Details   ondansetron (ZOFRAN ODT) 4 mg disintegrating tablet Take 1 Tablet by mouth every eight (8) hours as needed for Nausea or Vomiting for up to 6 doses. Qty: 6 Tablet, Refills: 0  Start date: 8/7/2022      oxyCODONE-acetaminophen (Percocet) 5-325 mg per tablet Take 1 Tablet by mouth every six (6) hours as needed for Pain for up to 4 doses. Max Daily Amount: 4 Tablets. Qty: 4 Tablet, Refills: 0  Start date: 8/7/2022, End date: 8/8/2022    Associated Diagnoses: Abdominal pain, LLQ (left lower quadrant)           2.    Follow-up Information       Follow up With Specialties Details Why Contact Info    Franchesac Billingsley MD Family Medicine In 1 week  [place]  [phone]      [facility] EMERGENCY DEPT Emergency Medicine  If symptoms worsen [address] [place] [address]  [phone]          3. Return to ED if worse     Diagnosis     Clinical Impression:   1. Constipation, unspecified constipation type    2. Abdominal pain, LLQ (left lower quadrant)        Attestations:    Lillian Camarena MD    Please note that this dictation was completed with [employer], the computer voice recognition software. Quite often unanticipated grammatical, syntax, homophones, and other interpretive errors are inadvertently transcribed by the computer software. Please disregard these errors. Please excuse any errors that have escaped final proofreading. Thank you.

## 2022-08-08 NOTE — ED NOTES
Patient discharged by [facility] FOSTER CHRISTINE MD  - pt sent to the front lobby, with strong and steady gait, no acute distress noted at time of discharge - Discharge information / home RX / and reasons to return to the ED were reviewed by the ED provider.

## 2022-08-12 NOTE — PROGRESS NOTES
Pharmacy Daily Dosing of Warfarin    Indication: H/o DVT (s/p IVC)  Goal INR: 2-3    PTA Warfarin Dose: 2.5 mg PO daily    Concurrent anticoagulants: None  Concurrent antiplatelet: Aspirin 81 mg daily    Major Interacting Medications   Drugs that may increase INR: None  Drugs that may decrease INR: None    Conditions that may increase/decrease INR (CHF, C. diff, cirrhosis, thyroid disorder, hypoalbuminemia): None    Labs:  Recent Labs     11/09/20  0748 11/09/20  0347  11/08/20  0210 11/07/20  0314   INR  --  2.3*  --  1.9* 1.9*   HGB  --  8.5*  --   --  8.4*   PLT  --  225  --   --  245   TBILI  --   --   --   --  0.4   ALB 3.0*  --    < >  --  2.9*    < > = values in this interval not displayed. Impression/Plan:   INR therapeutic after several \"booster\" doses, resume 2.5 mg daily as taking PTA  Daily INR  CBC w/o diff every other day      Pharmacy will follow daily and adjust the dose as appropriate. [email or web address]/[email or web address]/[email or web address]/[email or web address]. pdf Pt informed

## 2022-08-15 ENCOUNTER — HOSPITAL ENCOUNTER (EMERGENCY)
Age: 47
Discharge: HOME | End: 2022-08-16
Attending: EMERGENCY MEDICINE
Payer: MEDICARE

## 2022-08-15 DIAGNOSIS — R10.13 ABDOMINAL PAIN, EPIGASTRIC: Primary | ICD-10-CM

## 2022-08-15 DIAGNOSIS — R11.2 NAUSEA AND VOMITING, UNSPECIFIED VOMITING TYPE: ICD-10-CM

## 2022-08-15 LAB
ALBUMIN SERPL-MCNC: 3.1 G/DL (ref 3.5–5)
ALBUMIN/GLOB SERPL: 0.6 {RATIO} (ref 1.1–2.2)
ALP SERPL-CCNC: 323 U/L (ref 45–117)
ALT SERPL-CCNC: 10 U/L (ref 12–78)
ANION GAP SERPL CALC-SCNC: 10 MMOL/L (ref 5–15)
AST SERPL-CCNC: 24 U/L (ref 15–37)
BASOPHILS # BLD: 0.1 K/UL (ref 0–0.1)
BASOPHILS NFR BLD: 1 % (ref 0–1)
BILIRUB SERPL-MCNC: 0.5 MG/DL (ref 0.2–1)
BUN SERPL-MCNC: 40 MG/DL (ref 6–20)
BUN/CREAT SERPL: 7 (ref 12–20)
CALCIUM SERPL-MCNC: 8.8 MG/DL (ref 8.5–10.1)
CHLORIDE SERPL-SCNC: 99 MMOL/L (ref 97–108)
CO2 SERPL-SCNC: 26 MMOL/L (ref 21–32)
CREAT SERPL-MCNC: 5.64 MG/DL (ref 0.7–1.3)
DIFFERENTIAL METHOD BLD: ABNORMAL
EOSINOPHIL # BLD: 0.1 K/UL (ref 0–0.4)
EOSINOPHIL NFR BLD: 1 % (ref 0–7)
ERYTHROCYTE [DISTWIDTH] IN BLOOD BY AUTOMATED COUNT: 18.7 % (ref 11.5–14.5)
GLOBULIN SER CALC-MCNC: 4.9 G/DL (ref 2–4)
GLUCOSE SERPL-MCNC: 119 MG/DL (ref 65–100)
HCT VFR BLD AUTO: 29.9 % (ref 36.6–50.3)
HGB BLD-MCNC: 9.6 G/DL (ref 12.1–17)
IMM GRANULOCYTES # BLD AUTO: 0 K/UL (ref 0–0.04)
IMM GRANULOCYTES NFR BLD AUTO: 0 % (ref 0–0.5)
LIPASE SERPL-CCNC: 290 U/L (ref 73–393)
LYMPHOCYTES # BLD: 1.6 K/UL (ref 0.8–3.5)
LYMPHOCYTES NFR BLD: 15 % (ref 12–49)
MCH RBC QN AUTO: 29.4 PG (ref 26–34)
MCHC RBC AUTO-ENTMCNC: 32.1 G/DL (ref 30–36.5)
MCV RBC AUTO: 91.4 FL (ref 80–99)
MONOCYTES # BLD: 1 K/UL (ref 0–1)
MONOCYTES NFR BLD: 10 % (ref 5–13)
NEUTS SEG # BLD: 7.5 K/UL (ref 1.8–8)
NEUTS SEG NFR BLD: 73 % (ref 32–75)
NRBC # BLD: 0 K/UL (ref 0–0.01)
NRBC BLD-RTO: 0 PER 100 WBC
PLATELET # BLD AUTO: 184 K/UL (ref 150–400)
PMV BLD AUTO: 9.9 FL (ref 8.9–12.9)
POTASSIUM SERPL-SCNC: 3.4 MMOL/L (ref 3.5–5.1)
PROT SERPL-MCNC: 8 G/DL (ref 6.4–8.2)
RBC # BLD AUTO: 3.27 M/UL (ref 4.1–5.7)
SODIUM SERPL-SCNC: 135 MMOL/L (ref 136–145)
WBC # BLD AUTO: 10.4 K/UL (ref 4.1–11.1)

## 2022-08-15 PROCEDURE — 99284 EMERGENCY DEPT VISIT MOD MDM: CPT

## 2022-08-15 PROCEDURE — 93005 ELECTROCARDIOGRAM TRACING: CPT

## 2022-08-15 PROCEDURE — 85025 COMPLETE CBC W/AUTO DIFF WBC: CPT

## 2022-08-15 PROCEDURE — 74011250636 HC RX REV CODE- 250/636: Performed by: EMERGENCY MEDICINE

## 2022-08-15 PROCEDURE — 36415 COLL VENOUS BLD VENIPUNCTURE: CPT

## 2022-08-15 PROCEDURE — 74011250637 HC RX REV CODE- 250/637: Performed by: EMERGENCY MEDICINE

## 2022-08-15 PROCEDURE — 80053 COMPREHEN METABOLIC PANEL: CPT

## 2022-08-15 PROCEDURE — 96374 THER/PROPH/DIAG INJ IV PUSH: CPT

## 2022-08-15 PROCEDURE — 74011000250 HC RX REV CODE- 250: Performed by: EMERGENCY MEDICINE

## 2022-08-15 PROCEDURE — 83690 ASSAY OF LIPASE: CPT

## 2022-08-15 PROCEDURE — 96375 TX/PRO/DX INJ NEW DRUG ADDON: CPT

## 2022-08-15 RX ORDER — FENTANYL CITRATE 50 UG/ML
100 INJECTION, SOLUTION INTRAMUSCULAR; INTRAVENOUS
Status: COMPLETED | OUTPATIENT
Start: 2022-08-15 | End: 2022-08-15

## 2022-08-15 RX ORDER — DROPERIDOL 2.5 MG/ML
0.62 INJECTION, SOLUTION INTRAMUSCULAR; INTRAVENOUS ONCE
Status: COMPLETED | OUTPATIENT
Start: 2022-08-15 | End: 2022-08-15

## 2022-08-15 RX ADMIN — DROPERIDOL 0.62 MG: 2.5 INJECTION, SOLUTION INTRAMUSCULAR; INTRAVENOUS at 23:05

## 2022-08-15 RX ADMIN — FENTANYL CITRATE 100 MCG: 50 INJECTION, SOLUTION INTRAMUSCULAR; INTRAVENOUS at 23:05

## 2022-08-15 RX ADMIN — SODIUM CHLORIDE 500 ML: 9 INJECTION, SOLUTION INTRAVENOUS at 23:05

## 2022-08-15 RX ADMIN — ALUMINUM HYDROXIDE AND MAGNESIUM HYDROXIDE 40 ML: 200; 200 SUSPENSION ORAL at 23:05

## 2022-08-16 ENCOUNTER — APPOINTMENT (OUTPATIENT)
Dept: ULTRASOUND IMAGING | Age: 47
End: 2022-08-16
Attending: EMERGENCY MEDICINE
Payer: MEDICARE

## 2022-08-16 VITALS
WEIGHT: 128.09 LBS | SYSTOLIC BLOOD PRESSURE: 124 MMHG | HEART RATE: 95 BPM | BODY MASS INDEX: 20.1 KG/M2 | TEMPERATURE: 98.9 F | DIASTOLIC BLOOD PRESSURE: 86 MMHG | HEIGHT: 67 IN | OXYGEN SATURATION: 98 % | RESPIRATION RATE: 20 BRPM

## 2022-08-16 LAB
ATRIAL RATE: 90 BPM
CALCULATED P AXIS, ECG09: 51 DEGREES
CALCULATED R AXIS, ECG10: 61 DEGREES
CALCULATED T AXIS, ECG11: -179 DEGREES
DIAGNOSIS, 93000: NORMAL
P-R INTERVAL, ECG05: 144 MS
Q-T INTERVAL, ECG07: 414 MS
QRS DURATION, ECG06: 110 MS
QTC CALCULATION (BEZET), ECG08: 506 MS
VENTRICULAR RATE, ECG03: 90 BPM

## 2022-08-16 PROCEDURE — 74011250636 HC RX REV CODE- 250/636: Performed by: EMERGENCY MEDICINE

## 2022-08-16 PROCEDURE — 96376 TX/PRO/DX INJ SAME DRUG ADON: CPT

## 2022-08-16 PROCEDURE — 76705 ECHO EXAM OF ABDOMEN: CPT

## 2022-08-16 RX ORDER — FENTANYL CITRATE 50 UG/ML
50 INJECTION, SOLUTION INTRAMUSCULAR; INTRAVENOUS
Status: COMPLETED | OUTPATIENT
Start: 2022-08-16 | End: 2022-08-16

## 2022-08-16 RX ORDER — HYDROCODONE BITARTRATE AND ACETAMINOPHEN 5; 325 MG/1; MG/1
1 TABLET ORAL
Qty: 10 TABLET | Refills: 0 | Status: SHIPPED | OUTPATIENT
Start: 2022-08-16 | End: 2022-08-19

## 2022-08-16 RX ORDER — ONDANSETRON 4 MG/1
4 TABLET, ORALLY DISINTEGRATING ORAL
Qty: 10 TABLET | Refills: 0 | Status: SHIPPED | OUTPATIENT
Start: 2022-08-16

## 2022-08-16 RX ADMIN — FENTANYL CITRATE 50 MCG: 50 INJECTION, SOLUTION INTRAMUSCULAR; INTRAVENOUS at 01:30

## 2022-08-16 NOTE — ED NOTES
Discharge instructions given to patient by Tracie Saenz RN. Verbalized understanding of instructions. Patient discharged without difficulty. Patient discharged in stable condition via ambulation accompanied by self.

## 2022-08-16 NOTE — ED PROVIDER NOTES
EMERGENCY DEPARTMENT HISTORY AND PHYSICAL EXAM      Date: 8/15/2022  Patient Name: Sharon Bosch    History of Presenting Illness     Chief Complaint   Patient presents with    Vomiting     Three hours ago nausea vomiting stomach pain. Abdominal Pain     Has h/o bleeding ulcers       History Provided By: Patient    HPI: Sharon Bosch, 42 y.o. male presents to the ED with cc of abd pain. Pt states began with epigastric abd pain tonight rated 7/10. He states no alleviating or exacerbating factors. He has been nauseated and has had several episodes of vomiting. He denies hematemesis. He denies melena, hematochezia, BRBPR. He denies any CP, SOA. There has been no fever or chills. He denies any diarrhea. Pt states ESRD on nightly HD at home. He states prior hx of ulcers. Pt has had prior cholecystectomy. There are no other complaints, changes, or physical findings at this time. PCP: Yoav Carbajal MD    No current facility-administered medications on file prior to encounter. Current Outpatient Medications on File Prior to Encounter   Medication Sig Dispense Refill    [DISCONTINUED] ondansetron (ZOFRAN ODT) 4 mg disintegrating tablet Take 1 Tablet by mouth every eight (8) hours as needed for Nausea or Vomiting for up to 6 doses. 6 Tablet 0    sevelamer carbonate (Renvela) 800 mg tab tab Take 1,600 mg by mouth three (3) times daily. ondansetron hcl (Zofran) 4 mg tablet Take 1 Tablet by mouth every twelve (12) hours as needed for Nausea or Vomiting. 20 Tablet 0    dicyclomine (BENTYL) 20 mg tablet Take 1 Tablet by mouth every six (6) hours as needed for Abdominal Cramps. 20 Tablet 0    metoprolol succinate (TOPROL-XL) 25 mg XL tablet Take 12.5 mg by mouth daily. pantoprazole (PROTONIX) 40 mg tablet Take 1 Tablet by mouth Before breakfast and dinner. 60 Tablet 2    sucralfate (CARAFATE) 1 gram tablet Take 1 Tablet by mouth Before breakfast, lunch, dinner and at bedtime.  120 Tablet 2 polyethylene glycol (MIRALAX) 17 gram packet Take 1 Packet by mouth daily. 30 Packet 0    propranoloL (INDERAL) 10 mg tablet Take 10 mg by mouth two (2) times a day. (Patient not taking: Reported on 4/18/2022)      Vitamin D2 1,250 mcg (50,000 unit) capsule Take 50,000 Units by mouth every seven (7) days. calcium acetate,phosphat bind, (PHOSLO) 667 mg cap Take 2 Caps by mouth three (3) times daily (with meals). Indications: low amount of calcium in the blood, renal osteodystrophy with hyperphosphatemia (Patient not taking: Reported on 4/18/2022) 120 Cap 0    atorvastatin (Lipitor) 20 mg tablet Take 20 mg by mouth daily. acetaminophen (TYLENOL) 500 mg tablet Take 1 Tab by mouth every four (4) hours as needed for Pain. Over the counter 30 Tab 0    calcitRIOL (ROCALTROL) 0.5 mcg capsule Take 0.5 mcg by mouth daily. Past History     Past Medical History:  Past Medical History:   Diagnosis Date    Abdominal hematoma     AICD (automatic cardioverter/defibrillator) present     CAD (coronary artery disease)     anterior MI s/p 2 stents  2007    Chronic abdominal pain     Chronic kidney disease     ESRD; Dialysis dependent.  [facility] [id number] home x5 days week M-F does labs    DVT (deep venous thrombosis) ([address] 2001    DVT of popliteal vein ([address] 11/2011    not anticoagulated due to bleeding, IVC filter    Endocarditis     Gallstone pancreatitis     Gastrointestinal disorder     acid reflux    Gastrointestinal disorder     peptic ulcer    Hemodialysis patient ([address]     High cholesterol     HIT (heparin-induced thrombocytopenia) ([address]     Hypertension     Kidney transplant     b/l kidneys 1995, 2001    Long term current use of anticoagulant therapy     Nephrotic syndrome     Other ill-defined conditions(799.89)     kidny transplant x2,  on dialysis    Other ill-defined conditions(799.89)     home dialysis    Other ill-defined conditions(799.89)     hx recurrent left leg DVT    Peritonitis ([address] Seizures ([facility] 2015    most recent- only had three in lifetime    Small bowel obstruction (HCC)     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[facility]     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    [facility])        Past Surgical History:  Past Surgical History:   Procedure Laterality Date    HX APPENDECTOMY      HX CHOLECYSTECTOMY      HX OTHER SURGICAL  11/2011    exploratory laparotomy    HX OTHER SURGICAL      fem-pop    HX OTHER SURGICAL  02/2013    right upper extremity fistula    HX PACEMAKER Left 6/2009    AICD-st latonya-not connected    HX PACEMAKER Left     AICD-left side-BS-working    HX SMALL BOWEL RESECTION      HX TRANSPLANT  2001     Kidney    HX TRANSPLANT  1992    kidney    HX VASCULAR ACCESS Right     femoral access for HD- does 5 day dialysis @ home/left upper arm graft active, right upper arm occluded    IR INSERT NON TUNL CVC OVER 5 YRS  12/7/2021    IR INSERT NON TUNL CVC OVER 5 YRS  12/10/2021    IR INSERT TIPS HEPATIC SHUNT  12/30/2021    IR REMOVE TUNL CVAD W/O PORT / PUMP  10/29/2020    IR REPLACE CVC TUNNELED W/O PORT  9/10/2020    [facility] CARDIAC SURG PROCEDURE UNLIST  2007    2 stents    [facility] EDG US EXAM SURGICAL ALTER STOM DUODENUM/JEJUNUM  7/15/2011         [facility] ESOPHAGOGASTRODUODENOSCOPY TRANSORAL DIAGNOSTIC  5/24/2012         UPPER GI ENDOSCOPY,CTRL BLEED  12/6/2021         UPPER GI ENDOSCOPY,DIAGNOSIS  12/8/2021         UPPER GI ENDOSCOPY,DIAGNOSIS  4/20/2022         VASCULAR SURGERY PROCEDURE UNLIST  11/14/2017    Insertion AV graft right upper arm (bovine); ligation of AV fistula right arm       Family History:  Family History   Problem Relation Age of Onset    COPD Mother     Lung Disease Mother     Cancer Father         stomach    Cancer Maternal Grandmother        Social History:  Social History     Tobacco Use    Smoking status: Never    Smokeless tobacco: Never   Vaping Use    Vaping Use: Never used   Substance Use Topics    Alcohol use: No    Drug use: Never       Allergies:   Allergies   Allergen Reactions    Shellfish Containing Products Swelling     Break out in rash, trouble breathing    Contrast Agent [Iodine] Shortness of Breath    Heparin Analogues Other (comments)     Positive history of HIT         Review of Systems   Review of Systems   Constitutional: Negative. Negative for appetite change, chills, fatigue and fever. HENT: Negative. Negative for congestion, rhinorrhea, sinus pressure and sore throat. Eyes: Negative. Respiratory: Negative. Negative for cough, choking, chest tightness, shortness of breath and wheezing. Cardiovascular: Negative. Negative for chest pain, palpitations and leg swelling. Gastrointestinal:  Positive for abdominal pain (episgastric), nausea and vomiting. Negative for constipation and diarrhea. Endocrine: Negative. Genitourinary: Negative. Negative for difficulty urinating, dysuria, flank pain and urgency. Musculoskeletal: Negative. Skin: Negative. Neurological: Negative. Negative for dizziness, speech difficulty, weakness, light-headedness, numbness and headaches. Psychiatric/Behavioral: Negative. All other systems reviewed and are negative. Physical Exam   Physical Exam  Vitals and nursing note reviewed. Constitutional:       General: He is not in acute distress. Appearance: He is well-developed. He is not diaphoretic. HENT:      Head: Normocephalic and atraumatic. Mouth/Throat:      Mouth: Mucous membranes are dry. Pharynx: No oropharyngeal exudate. Eyes:      General: No scleral icterus. Extraocular Movements: Extraocular movements intact. Conjunctiva/sclera: Conjunctivae normal.      Pupils: Pupils are equal, round, and reactive to light. Neck:      Vascular: No JVD. Trachea: No tracheal deviation. Cardiovascular:      Rate and Rhythm: Normal rate and regular rhythm. Heart sounds: Normal heart sounds. No murmur heard. Pulmonary:      Effort: Pulmonary effort is normal. No respiratory distress.       Breath sounds: Normal breath sounds. No stridor. No wheezing or rales. Comments: Pacemaker, dialysis cath present     Abdominal:      General: There is no distension. Palpations: Abdomen is soft. Tenderness: There is abdominal tenderness (epigastric). There is no guarding or rebound. Musculoskeletal:         General: No tenderness. Normal range of motion. Cervical back: Normal range of motion and neck supple. Skin:     General: Skin is warm and dry. Neurological:      Mental Status: He is alert and oriented to person, place, and time. Cranial Nerves: No cranial nerve deficit.       Comments: No gross motor or sensory deficits    Psychiatric:         Behavior: Behavior normal.       Diagnostic Study Results     Labs -     Recent Results (from the past 12 hour(s))   EKG, 12 LEAD, INITIAL    Collection Time: 08/15/22 10:37 PM   Result Value Ref Range    Ventricular Rate 90 BPM    Atrial Rate 90 BPM    P-R Interval 144 ms    QRS Duration 110 ms    Q-T Interval 414 ms    QTC Calculation (Bezet) 506 ms    Calculated P Axis 51 degrees    Calculated R Axis 61 degrees    Calculated T Axis -179 degrees    Diagnosis       Normal sinus rhythm  T wave abnormality, consider inferior ischemia  Prolonged QT  When compared with ECG of 13-JAN-2022 11:13,  Inverted T waves have replaced nonspecific T wave abnormality in Inferior   leads       Recent Results (from the past 24 hour(s))   CBC WITH AUTOMATED DIFF    Collection Time: 08/15/22  7:33 PM   Result Value Ref Range    WBC 10.4 4.1 - 11.1 K/uL    RBC 3.27 (L) 4.10 - 5.70 M/uL    HGB 9.6 (L) 12.1 - 17.0 g/dL    HCT 29.9 (L) 36.6 - 50.3 %    MCV 91.4 80.0 - 99.0 FL    MCH 29.4 26.0 - 34.0 PG    MCHC 32.1 30.0 - 36.5 g/dL    RDW 18.7 (H) 11.5 - 14.5 %    PLATELET 026 878 - 221 K/uL    MPV 9.9 8.9 - 12.9 FL    NRBC 0.0 0  WBC    ABSOLUTE NRBC 0.00 0.00 - 0.01 K/uL    NEUTROPHILS 73 32 - 75 %    LYMPHOCYTES 15 12 - 49 %    MONOCYTES 10 5 - 13 % EOSINOPHILS 1 0 - 7 %    BASOPHILS 1 0 - 1 %    IMMATURE GRANULOCYTES 0 0.0 - 0.5 %    ABS. NEUTROPHILS 7.5 1.8 - 8.0 K/UL    ABS. LYMPHOCYTES 1.6 0.8 - 3.5 K/UL    ABS. MONOCYTES 1.0 0.0 - 1.0 K/UL    ABS. EOSINOPHILS 0.1 0.0 - 0.4 K/UL    ABS. BASOPHILS 0.1 0.0 - 0.1 K/UL    ABS. IMM. GRANS. 0.0 0.00 - 0.04 K/UL    DF AUTOMATED     METABOLIC PANEL, COMPREHENSIVE    Collection Time: 08/15/22  7:33 PM   Result Value Ref Range    Sodium 135 (L) 136 - 145 mmol/L    Potassium 3.4 (L) 3.5 - 5.1 mmol/L    Chloride 99 97 - 108 mmol/L    CO2 26 21 - 32 mmol/L    Anion gap 10 5 - 15 mmol/L    Glucose 119 (H) 65 - 100 mg/dL    BUN 40 (H) 6 - 20 MG/DL    Creatinine 5.64 (H) 0.70 - 1.30 MG/DL    BUN/Creatinine ratio 7 (L) 12 - 20      GFR est AA 13 (L) >60 ml/min/1.73m2    GFR est non-AA 11 (L) >60 ml/min/1.73m2    Calcium 8.8 8.5 - 10.1 MG/DL    Bilirubin, total 0.5 0.2 - 1.0 MG/DL    ALT (SGPT) 10 (L) 12 - 78 U/L    AST (SGOT) 24 15 - 37 U/L    Alk. phosphatase 323 (H) 45 - 117 U/L    Protein, total 8.0 6.4 - 8.2 g/dL    Albumin 3.1 (L) 3.5 - 5.0 g/dL    Globulin 4.9 (H) 2.0 - 4.0 g/dL    A-G Ratio 0.6 (L) 1.1 - 2.2     LIPASE    Collection Time: 08/15/22  7:33 PM   Result Value Ref Range    Lipase 290 73 - 393 U/L   EKG, 12 LEAD, INITIAL    Collection Time: 08/15/22 10:37 PM   Result Value Ref Range    Ventricular Rate 90 BPM    Atrial Rate 90 BPM    P-R Interval 144 ms    QRS Duration 110 ms    Q-T Interval 414 ms    QTC Calculation (Bezet) 506 ms    Calculated P Axis 51 degrees    Calculated R Axis 61 degrees    Calculated T Axis -179 degrees    Diagnosis       Normal sinus rhythm  T wave abnormality, consider inferior ischemia  Prolonged QT  When compared with ECG of 13-JAN-2022 11:13,  Inverted T waves have replaced nonspecific T wave abnormality in Inferior   leads           Radiologic Studies -   US ABD LTD   Final Result      Cirrhosis with ascites and right pleural effusion.             CT Results  (Last 48 hours) None          CXR Results  (Last 48 hours)      None            Medical Decision Making   I am the first provider for this patient. I reviewed the vital signs, available nursing notes, past medical history, past surgical history, family history and social history. Vital Signs-Reviewed the patient's vital signs. Patient Vitals for the past 12 hrs:   Pulse Resp BP SpO2   08/16/22 0050 -- 20 -- --   08/15/22 2354 95 -- 124/86 98 %   08/15/22 2324 94 -- 122/86 98 %   08/15/22 2254 96 (!) 35 126/83 99 %       EKG interpretation: (Preliminary)  NSR, rate 90, normal axis/pr/qrs, NSST changes, Stanton Ruth DO      Records Reviewed: Nursing Notes, Old Medical Records, Previous Radiology Studies, and Previous Laboratory Studies  Pt had been seen on 8/7 for Abdominal pain and constipation, hx of chronic DVT, followed by Pain management, last admission 5/26/22- Hypokalemia, abdominal pain, ESRD on dialysis    Provider Notes (Medical Decision Making):   DDx- Gastritis, pancreatitis,  CBD obstruction     ED Course:   Initial assessment performed. The patients presenting problems have been discussed, and they are in agreement with the care plan formulated and outlined with them. I have encouraged them to ask questions as they arise throughout their visit. Pt did have relief in epigastric pain with GI cocktail. US- CBD normal size, pt due for his nightly HD which he will do when he gets home. Pt dc home. Pt in no resp distress with no SOA, normal O2 sats. Disposition:    DC- Adult Discharges: All of the diagnostic tests were reviewed and questions answered. Diagnosis, care plan and treatment options were discussed. The patient understands the instructions and will follow up as directed. The patients results have been reviewed with them. They have been counseled regarding their diagnosis.   The patient verbally convey understanding and agreement of the signs, symptoms, diagnosis, treatment and prognosis and additionally agrees to follow up as recommended with their PCP in 24 - 48 hours. They also agree with the care-plan and convey that all of their questions have been answered. I have also put together some discharge instructions for them that include: 1) educational information regarding their diagnosis, 2) how to care for their diagnosis at home, as well a 3) list of reasons why they would want to return to the ED prior to their follow-up appointment, should their condition change. DISCHARGE PLAN:  1. Discharge Medication List as of 8/16/2022  2:20 AM        START taking these medications    Details   ondansetron (ZOFRAN ODT) 4 mg disintegrating tablet Take 1 Tablet by mouth every eight (8) hours as needed for Nausea or Vomiting., Normal, Disp-10 Tablet, R-0      HYDROcodone-acetaminophen (Norco) 5-325 mg per tablet Take 1 Tablet by mouth every six (6) hours as needed for Pain for up to 3 days. Max Daily Amount: 4 Tablets., Normal, Disp-10 Tablet, R-0           CONTINUE these medications which have NOT CHANGED    Details   sevelamer carbonate (Renvela) 800 mg tab tab Take 1,600 mg by mouth three (3) times daily. , Historical Med      ondansetron hcl (Zofran) 4 mg tablet Take 1 Tablet by mouth every twelve (12) hours as needed for Nausea or Vomiting., Normal, Disp-20 Tablet, R-0      dicyclomine (BENTYL) 20 mg tablet Take 1 Tablet by mouth every six (6) hours as needed for Abdominal Cramps., Normal, Disp-20 Tablet, R-0      metoprolol succinate (TOPROL-XL) 25 mg XL tablet Take 12.5 mg by mouth daily. , Historical Med      pantoprazole (PROTONIX) 40 mg tablet Take 1 Tablet by mouth Before breakfast and dinner., Normal, Disp-60 Tablet, R-2      sucralfate (CARAFATE) 1 gram tablet Take 1 Tablet by mouth Before breakfast, lunch, dinner and at bedtime., Normal, Disp-120 Tablet, R-2      polyethylene glycol (MIRALAX) 17 gram packet Take 1 Packet by mouth daily. , Normal, Disp-30 Packet, R-0 propranoloL (INDERAL) 10 mg tablet Take 10 mg by mouth two (2) times a day., Historical Med      Vitamin D2 1,250 mcg (50,000 unit) capsule Take 50,000 Units by mouth every seven (7) days. , Historical Med, KIMBERLY      calcium acetate,phosphat bind, (PHOSLO) 667 mg cap Take 2 Caps by mouth three (3) times daily (with meals). Indications: low amount of calcium in the blood, renal osteodystrophy with hyperphosphatemia, Normal, Disp-120 Cap,R-0      atorvastatin (Lipitor) 20 mg tablet Take 20 mg by mouth daily. , Historical Med      acetaminophen (TYLENOL) 500 mg tablet Take 1 Tab by mouth every four (4) hours as needed for Pain. Over the counter, No Print, Disp-30 Tab,R-0      calcitRIOL (ROCALTROL) 0.5 mcg capsule Take 0.5 mcg by mouth daily. , Historical Med           2. Follow-up Information    None       3. Return to ED if worse     Diagnosis     Clinical Impression:   1. Abdominal pain, epigastric    2. Nausea and vomiting, unspecified vomiting type        Attestations:    Divya Newman, DO        Please note that this dictation was completed with [employer], the computer voice recognition software. Quite often unanticipated grammatical, syntax, homophones, and other interpretive errors are inadvertently transcribed by the computer software. Please disregard these errors. Please excuse any errors that have escaped final proofreading. Thank you.

## 2022-09-10 ENCOUNTER — HOSPITAL ENCOUNTER (EMERGENCY)
Age: 47
Discharge: HOME | End: 2022-09-10
Attending: STUDENT IN AN ORGANIZED HEALTH CARE EDUCATION/TRAINING PROGRAM
Payer: MEDICARE

## 2022-09-10 ENCOUNTER — APPOINTMENT (OUTPATIENT)
Dept: CT IMAGING | Age: 47
End: 2022-09-10
Attending: STUDENT IN AN ORGANIZED HEALTH CARE EDUCATION/TRAINING PROGRAM
Payer: MEDICARE

## 2022-09-10 VITALS
RESPIRATION RATE: 18 BRPM | WEIGHT: 130.95 LBS | HEIGHT: 67 IN | DIASTOLIC BLOOD PRESSURE: 85 MMHG | BODY MASS INDEX: 20.55 KG/M2 | SYSTOLIC BLOOD PRESSURE: 137 MMHG | OXYGEN SATURATION: 99 % | TEMPERATURE: 98.7 F | HEART RATE: 102 BPM

## 2022-09-10 DIAGNOSIS — K59.00 CONSTIPATION, UNSPECIFIED CONSTIPATION TYPE: ICD-10-CM

## 2022-09-10 DIAGNOSIS — R10.84 ABDOMINAL PAIN, GENERALIZED: Primary | ICD-10-CM

## 2022-09-10 LAB
ANION GAP SERPL CALC-SCNC: 11 MMOL/L (ref 5–15)
BASOPHILS # BLD: 0.1 K/UL (ref 0–0.1)
BASOPHILS NFR BLD: 1 % (ref 0–1)
BUN SERPL-MCNC: 48 MG/DL (ref 6–20)
BUN/CREAT SERPL: 6 (ref 12–20)
CALCIUM SERPL-MCNC: 9.9 MG/DL (ref 8.5–10.1)
CHLORIDE SERPL-SCNC: 95 MMOL/L (ref 97–108)
CO2 SERPL-SCNC: 28 MMOL/L (ref 21–32)
CREAT SERPL-MCNC: 8.12 MG/DL (ref 0.7–1.3)
DIFFERENTIAL METHOD BLD: ABNORMAL
EOSINOPHIL # BLD: 0.3 K/UL (ref 0–0.4)
EOSINOPHIL NFR BLD: 4 % (ref 0–7)
ERYTHROCYTE [DISTWIDTH] IN BLOOD BY AUTOMATED COUNT: 16.3 % (ref 11.5–14.5)
GLUCOSE SERPL-MCNC: 77 MG/DL (ref 65–100)
HCT VFR BLD AUTO: 32.1 % (ref 36.6–50.3)
HGB BLD-MCNC: 10.5 G/DL (ref 12.1–17)
IMM GRANULOCYTES # BLD AUTO: 0 K/UL (ref 0–0.04)
IMM GRANULOCYTES NFR BLD AUTO: 0 % (ref 0–0.5)
LACTATE SERPL-SCNC: 1.3 MMOL/L (ref 0.4–2)
LIPASE SERPL-CCNC: 177 U/L (ref 73–393)
LYMPHOCYTES # BLD: 1.3 K/UL (ref 0.8–3.5)
LYMPHOCYTES NFR BLD: 16 % (ref 12–49)
MCH RBC QN AUTO: 29.9 PG (ref 26–34)
MCHC RBC AUTO-ENTMCNC: 32.7 G/DL (ref 30–36.5)
MCV RBC AUTO: 91.5 FL (ref 80–99)
MONOCYTES # BLD: 0.9 K/UL (ref 0–1)
MONOCYTES NFR BLD: 11 % (ref 5–13)
NEUTS SEG # BLD: 5.5 K/UL (ref 1.8–8)
NEUTS SEG NFR BLD: 68 % (ref 32–75)
NRBC # BLD: 0 K/UL (ref 0–0.01)
NRBC BLD-RTO: 0 PER 100 WBC
PLATELET # BLD AUTO: 196 K/UL (ref 150–400)
PMV BLD AUTO: 10.1 FL (ref 8.9–12.9)
POTASSIUM SERPL-SCNC: 3.9 MMOL/L (ref 3.5–5.1)
RBC # BLD AUTO: 3.51 M/UL (ref 4.1–5.7)
SODIUM SERPL-SCNC: 134 MMOL/L (ref 136–145)
TROPONIN-HIGH SENSITIVITY: 62 NG/L (ref 0–76)
WBC # BLD AUTO: 8 K/UL (ref 4.1–11.1)

## 2022-09-10 PROCEDURE — 85025 COMPLETE CBC W/AUTO DIFF WBC: CPT

## 2022-09-10 PROCEDURE — 84484 ASSAY OF TROPONIN QUANT: CPT

## 2022-09-10 PROCEDURE — 36415 COLL VENOUS BLD VENIPUNCTURE: CPT

## 2022-09-10 PROCEDURE — 74176 CT ABD & PELVIS W/O CONTRAST: CPT

## 2022-09-10 PROCEDURE — 96375 TX/PRO/DX INJ NEW DRUG ADDON: CPT

## 2022-09-10 PROCEDURE — 83605 ASSAY OF LACTIC ACID: CPT

## 2022-09-10 PROCEDURE — 80048 BASIC METABOLIC PNL TOTAL CA: CPT

## 2022-09-10 PROCEDURE — 74011250636 HC RX REV CODE- 250/636: Performed by: STUDENT IN AN ORGANIZED HEALTH CARE EDUCATION/TRAINING PROGRAM

## 2022-09-10 PROCEDURE — 96374 THER/PROPH/DIAG INJ IV PUSH: CPT

## 2022-09-10 PROCEDURE — 99284 EMERGENCY DEPT VISIT MOD MDM: CPT

## 2022-09-10 PROCEDURE — 83690 ASSAY OF LIPASE: CPT

## 2022-09-10 RX ORDER — ONDANSETRON 2 MG/ML
4 INJECTION INTRAMUSCULAR; INTRAVENOUS
Status: COMPLETED | OUTPATIENT
Start: 2022-09-10 | End: 2022-09-10

## 2022-09-10 RX ORDER — HYDROMORPHONE HYDROCHLORIDE 1 MG/ML
0.5 INJECTION, SOLUTION INTRAMUSCULAR; INTRAVENOUS; SUBCUTANEOUS ONCE
Status: COMPLETED | OUTPATIENT
Start: 2022-09-10 | End: 2022-09-10

## 2022-09-10 RX ADMIN — ONDANSETRON 4 MG: 2 INJECTION INTRAMUSCULAR; INTRAVENOUS at 10:38

## 2022-09-10 RX ADMIN — HYDROMORPHONE HYDROCHLORIDE 0.5 MG: 1 INJECTION, SOLUTION INTRAMUSCULAR; INTRAVENOUS; SUBCUTANEOUS at 10:38

## 2022-09-10 NOTE — ED PROVIDER NOTES
EMERGENCY DEPARTMENT HISTORY AND PHYSICAL EXAM    Date: 9/10/2022  Patient Name: German Story    History of Presenting Illness     Chief Complaint   Patient presents with    Abdominal Pain     Had diaylsis yesterday now with abd pain       History Provided By: Patient    HPI: German Story, 42 y.o. male pancreatitis, diverticulitis, and end-stage renal disease on dialysis Monday Wednesday Friday presents to the ED with cc of severe, nonradiating, sharp generalized abdominal pain that is worse in the left lower quadrant. Pain started yesterday and has been constant. He denies any fevers or chills, he has had nausea and vomiting, but no blood. He denies any rectal bleeding. He does not make any urine. There are no other complaints, changes, or physical findings at this time. PCP: Len Garcia MD    No current facility-administered medications on file prior to encounter. Current Outpatient Medications on File Prior to Encounter   Medication Sig Dispense Refill    ondansetron (ZOFRAN ODT) 4 mg disintegrating tablet Take 1 Tablet by mouth every eight (8) hours as needed for Nausea or Vomiting. 10 Tablet 0    sevelamer carbonate (Renvela) 800 mg tab tab Take 1,600 mg by mouth three (3) times daily. ondansetron hcl (Zofran) 4 mg tablet Take 1 Tablet by mouth every twelve (12) hours as needed for Nausea or Vomiting. 20 Tablet 0    dicyclomine (BENTYL) 20 mg tablet Take 1 Tablet by mouth every six (6) hours as needed for Abdominal Cramps. 20 Tablet 0    metoprolol succinate (TOPROL-XL) 25 mg XL tablet Take 12.5 mg by mouth daily. pantoprazole (PROTONIX) 40 mg tablet Take 1 Tablet by mouth Before breakfast and dinner. 60 Tablet 2    sucralfate (CARAFATE) 1 gram tablet Take 1 Tablet by mouth Before breakfast, lunch, dinner and at bedtime. 120 Tablet 2    polyethylene glycol (MIRALAX) 17 gram packet Take 1 Packet by mouth daily.  30 Packet 0    propranoloL (INDERAL) 10 mg tablet Take 10 mg by mouth two (2) times a day. (Patient not taking: Reported on 4/18/2022)      Vitamin D2 1,250 mcg (50,000 unit) capsule Take 50,000 Units by mouth every seven (7) days. calcium acetate,phosphat bind, (PHOSLO) 667 mg cap Take 2 Caps by mouth three (3) times daily (with meals). Indications: low amount of calcium in the blood, renal osteodystrophy with hyperphosphatemia (Patient not taking: Reported on 4/18/2022) 120 Cap 0    atorvastatin (Lipitor) 20 mg tablet Take 20 mg by mouth daily. acetaminophen (TYLENOL) 500 mg tablet Take 1 Tab by mouth every four (4) hours as needed for Pain. Over the counter 30 Tab 0    calcitRIOL (ROCALTROL) 0.5 mcg capsule Take 0.5 mcg by mouth daily. Past History     Past Medical History:  Past Medical History:   Diagnosis Date    Abdominal hematoma     AICD (automatic cardioverter/defibrillator) present     CAD (coronary artery disease)     anterior MI s/p 2 stents  2007    Chronic abdominal pain     Chronic kidney disease     ESRD; Dialysis dependent.  [facility] [address] home x5 days week M-F does labs    DVT (deep venous thrombosis) ([address] 2001    DVT of popliteal vein ([address] 11/2011    not anticoagulated due to bleeding, IVC filter    Endocarditis     Gallstone pancreatitis     Gastrointestinal disorder     acid reflux    Gastrointestinal disorder     peptic ulcer    Hemodialysis patient ([address]     High cholesterol     HIT (heparin-induced thrombocytopenia) ([address]     Hypertension     Kidney transplant     b/l kidneys 1995, 2001    Long term current use of anticoagulant therapy     Nephrotic syndrome     Other ill-defined conditions(799.89)     kidny transplant x2,  on dialysis    Other ill-defined conditions(799.89)     home dialysis    Other ill-defined conditions(799.89)     hx recurrent left leg DVT    Peritonitis ([address]     Seizures ([address] 2015    most recent- only had three in lifetime    Small bowel obstruction (HCC)     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    [facility]ach [facility])        Past Surgical History:  Past Surgical History:   Procedure Laterality Date    HX APPENDECTOMY      HX CHOLECYSTECTOMY      HX OTHER SURGICAL  11/2011    exploratory laparotomy    HX OTHER SURGICAL      fem-pop    HX OTHER SURGICAL  02/2013    right upper extremity fistula    HX PACEMAKER Left 6/2009    AICD-st latonya-not connected    HX PACEMAKER Left     AICD-left side-BS-working    HX SMALL BOWEL RESECTION      HX TRANSPLANT  2001     Kidney    HX TRANSPLANT  1992    kidney    HX VASCULAR ACCESS Right     femoral access for HD- does 5 day dialysis @ home/left upper arm graft active, right upper arm occluded    IR INSERT NON TUNL CVC OVER 5 YRS  12/7/2021    IR INSERT NON TUNL CVC OVER 5 YRS  12/10/2021    IR INSERT TIPS HEPATIC SHUNT  12/30/2021    IR REMOVE TUNL CVAD W/O PORT / PUMP  10/29/2020    IR REPLACE CVC TUNNELED W/O PORT  9/10/2020    OR CARDIAC SURG PROCEDURE UNLIST  2007    2 stents    OR EDG US EXAM SURGICAL ALTER STOM DUODENUM/JEJUNUM  7/15/2011         OR ESOPHAGOGASTRODUODENOSCOPY TRANSORAL DIAGNOSTIC  5/24/2012         UPPER GI ENDOSCOPY,CTRL BLEED  12/6/2021         UPPER GI ENDOSCOPY,DIAGNOSIS  12/8/2021         UPPER GI ENDOSCOPY,DIAGNOSIS  4/20/2022         VASCULAR SURGERY PROCEDURE UNLIST  11/14/2017    Insertion AV graft right upper arm (bovine); ligation of AV fistula right arm       Family History:  Family History   Problem Relation Age of Onset    COPD Mother     Lung Disease Mother     Cancer Father         stomach    Cancer Maternal Grandmother        Social History:  Social History     Tobacco Use    Smoking status: Never    Smokeless tobacco: Never   Vaping Use    Vaping Use: Never used   Substance Use Topics    Alcohol use: No    Drug use: Never       Allergies:   Allergies   Allergen Reactions    Shellfish Containing Products Swelling     Break out in rash, trouble breathing    Contrast Agent [Iodine] Shortness of Breath    Heparin Analogues Other (comments)     Positive history of HIT         Review of Systems   Review of Systems   Constitutional:  Negative for appetite change. HENT:  Negative for sore throat. Eyes:  Negative for visual disturbance. Respiratory:  Negative for cough and shortness of breath. Cardiovascular:  Negative for chest pain. Gastrointestinal:  Positive for abdominal pain, nausea and vomiting. Negative for diarrhea. Genitourinary:         Does not make urine   Musculoskeletal:  Negative for myalgias. Skin:  Negative for color change. Neurological:  Negative for dizziness and headaches. Psychiatric/Behavioral:  Negative for agitation. Physical Exam   Physical Exam  Constitutional:       Appearance: Normal appearance. HENT:      Head: Normocephalic and atraumatic. Mouth/Throat:      Mouth: Mucous membranes are moist.   Eyes:      Conjunctiva/sclera: Conjunctivae normal.      Pupils: Pupils are equal, round, and reactive to light. Cardiovascular:      Rate and Rhythm: Normal rate and regular rhythm. Pulmonary:      Effort: Pulmonary effort is normal.      Breath sounds: Normal breath sounds. Abdominal:      General: Abdomen is flat. There is no distension. Palpations: Abdomen is soft. Tenderness: There is no guarding or rebound. Comments: Multiple surgical abdominal scars that are well-healed  Generalized abdominal tenderness, mostly left lower quadrant  No rebound or guarding   Musculoskeletal:         General: No swelling. Normal range of motion. Cervical back: Normal range of motion. Skin:     General: Skin is warm and dry. Capillary Refill: Capillary refill takes less than 2 seconds. Comments: AV graft in left upper extremity with thrill   Neurological:      General: No focal deficit present. Mental Status: He is alert and oriented to person, place, and time.    Psychiatric:         Mood and Affect: Mood normal.       Diagnostic Study Results     Labs - Recent Results (from the past 24 hour(s))   CBC WITH AUTOMATED DIFF    Collection Time: 09/10/22  9:21 AM   Result Value Ref Range    WBC 8.0 4.1 - 11.1 K/uL    RBC 3.51 (L) 4.10 - 5.70 M/uL    HGB 10.5 (L) 12.1 - 17.0 g/dL    HCT 32.1 (L) 36.6 - 50.3 %    MCV 91.5 80.0 - 99.0 FL    MCH 29.9 26.0 - 34.0 PG    MCHC 32.7 30.0 - 36.5 g/dL    RDW 16.3 (H) 11.5 - 14.5 %    PLATELET 241 993 - 299 K/uL    MPV 10.1 8.9 - 12.9 FL    NRBC 0.0 0  WBC    ABSOLUTE NRBC 0.00 0.00 - 0.01 K/uL    NEUTROPHILS 68 32 - 75 %    LYMPHOCYTES 16 12 - 49 %    MONOCYTES 11 5 - 13 %    EOSINOPHILS 4 0 - 7 %    BASOPHILS 1 0 - 1 %    IMMATURE GRANULOCYTES 0 0.0 - 0.5 %    ABS. NEUTROPHILS 5.5 1.8 - 8.0 K/UL    ABS. LYMPHOCYTES 1.3 0.8 - 3.5 K/UL    ABS. MONOCYTES 0.9 0.0 - 1.0 K/UL    ABS. EOSINOPHILS 0.3 0.0 - 0.4 K/UL    ABS. BASOPHILS 0.1 0.0 - 0.1 K/UL    ABS. IMM. GRANS. 0.0 0.00 - 0.04 K/UL    DF AUTOMATED     METABOLIC PANEL, BASIC    Collection Time: 09/10/22  9:21 AM   Result Value Ref Range    Sodium 134 (L) 136 - 145 mmol/L    Potassium 3.9 3.5 - 5.1 mmol/L    Chloride 95 (L) 97 - 108 mmol/L    CO2 28 21 - 32 mmol/L    Anion gap 11 5 - 15 mmol/L    Glucose 77 65 - 100 mg/dL    BUN 48 (H) 6 - 20 MG/DL    Creatinine 8.12 (H) 0.70 - 1.30 MG/DL    BUN/Creatinine ratio 6 (L) 12 - 20      GFR est AA 9 (L) >60 ml/min/1.73m2    GFR est non-AA 7 (L) >60 ml/min/1.73m2    Calcium 9.9 8.5 - 10.1 MG/DL   LIPASE    Collection Time: 09/10/22  9:21 AM   Result Value Ref Range    Lipase 177 73 - 393 U/L   LACTIC ACID    Collection Time: 09/10/22  9:22 AM   Result Value Ref Range    Lactic acid 1.3 0.4 - 2.0 MMOL/L   TROPONIN-HIGH SENSITIVITY    Collection Time: 09/10/22  9:22 AM   Result Value Ref Range    Troponin-High Sensitivity 62 0 - 76 ng/L       Radiologic Studies -   CT ABD PELV WO CONT   Final Result      1. No acute abdominal or pelvic pathology. No significant change. 2. Chronic moderate constipation.    3. Chronic right pleural effusion with right lung opacification. CT Results  (Last 48 hours)                 09/10/22 0954  CT ABD PELV WO CONT Final result    Impression:      1. No acute abdominal or pelvic pathology. No significant change. 2. Chronic moderate constipation. 3. Chronic right pleural effusion with right lung opacification. Narrative:  EXAM: CT ABD PELV WO CONT       INDICATION: eval LLQ abdominal pain       COMPARISON: CT August 7, 2022       IV CONTRAST: None. ORAL CONTRAST: Not given       TECHNIQUE:    Thin axial images were obtained through the abdomen and pelvis. Coronal and   sagittal reformats were generated. CT dose reduction was achieved through use of   a standardized protocol tailored for this examination and automatic exposure   control for dose modulation. The absence of intravenous contrast material reduces the sensitivity for   evaluation of the vasculature and solid organs. FINDINGS:    LOWER THORAX: Chronic large complicated right pleural effusion with dense   opacification of portions of the right lung, similar to prior study. Cardiomegaly. LIVER: No mass. BILIARY TREE: Status post cholecystectomy. CBD is not dilated. SPLEEN: within normal limits. PANCREAS: No focal abnormality. ADRENALS: Unremarkable. KIDNEYS/URETERS: Atrophic native kidneys. No hydronephrosis. STOMACH: Unremarkable. SMALL BOWEL: Postsurgical changes and areas of mild dilatation. No evidence of   bowel obstruction or perforation. COLON: Moderate generalized constipation   APPENDIX: Not visualized   PERITONEUM: Chronic calcified mid mesenteric mass. RETROPERITONEUM: Chronic calcifications in the left retroperitoneum. Severe   diffuse calcific aortic atherosclerosis. REPRODUCTIVE ORGANS: Prostate is noted   URINARY BLADDER: No mass or calculus. BONES: No destructive bone lesion. ABDOMINAL WALL: No mass or hernia.    ADDITIONAL COMMENTS: Renal osteodystrophy is noted CXR Results  (Last 48 hours)      None            Medical Decision Making   I am the first provider for this patient. I reviewed the vital signs, available nursing notes, past medical history, past surgical history, family history and social history. Vital Signs-Reviewed the patient's vital signs. Patient Vitals for the past 12 hrs:   BP SpO2   09/10/22 1115 137/85 99 %   09/10/22 1100 135/86 98 %   09/10/22 1045 (!) 143/92 97 %   09/10/22 1035 (!) 149/96 99 %       Records Reviewed: Nursing records and medical records reviewed    Reviewed previous ED visits for abdominal pain    Provider Notes (Medical Decision Making):   Diverticulitis, Abdominal Pain, ESRD, Intraabdominal pathology    Pt presented with generalized abdominal pain, mostly on left side. Found to have increased stool burden, but no acute pathology. Symptoms improved with IV zofran and IV dilaudid. Held off on IVF with hx of ESRD. Obtained full HD session yesterday. Labs and imaging unremarkable. Pain improved while in the ED. ED Course:   Initial assessment performed. The patients presenting problems have been discussed, and they are in agreement with the care plan formulated and outlined with them. I have encouraged them to ask questions as they arise throughout their visit. ED Course as of 09/10/22 2129   Sat Sep 10, 2022   1053 Reevaluated. Pain is improved. Labs are unremarkable. Imaging is unremarkable. Patient asking for narcotics at home and I declined this request.  Gave customary return precautions and recommended following up with his primary care doctor. He voiced understanding. [WB]      ED Course User Index  [WB] Ainsley Lund MD       Disposition:  Home    DISCHARGE PLAN:  1. Discharge Medication List as of 9/10/2022 10:54 AM        2.    Follow-up Information       Follow up With Specialties Details Why Contact Info    [facility] EMERGENCY DEPT Emergency Medicine  As needed, If symptoms worsen [address] [facility]  [phone]    Poncho Figueroa MD Family Medicine Schedule an appointment as soon as possible for a visit   [facility]  [phone]            3. Return to ED if worse     Diagnosis     Clinical Impression:   1. Abdominal pain, generalized    2. Constipation, unspecified constipation type        Attestations:    Anu Casarez MD    Please note that this dictation was completed with [employer], the computer voice recognition software. Quite often unanticipated grammatical, syntax, homophones, and other interpretive errors are inadvertently transcribed by the computer software. Please disregard these errors. Please excuse any errors that have escaped final proofreading. Thank you.

## 2022-09-16 ENCOUNTER — APPOINTMENT (OUTPATIENT)
Dept: ULTRASOUND IMAGING | Age: 47
DRG: 252 | End: 2022-09-16
Attending: EMERGENCY MEDICINE
Payer: MEDICARE

## 2022-09-16 ENCOUNTER — HOSPITAL ENCOUNTER (EMERGENCY)
Age: 47
Discharge: HOME | DRG: 252 | End: 2022-09-16
Attending: EMERGENCY MEDICINE
Payer: MEDICARE

## 2022-09-16 VITALS
HEART RATE: 95 BPM | HEIGHT: 67 IN | SYSTOLIC BLOOD PRESSURE: 95 MMHG | DIASTOLIC BLOOD PRESSURE: 67 MMHG | BODY MASS INDEX: 20.55 KG/M2 | WEIGHT: 130.95 LBS | OXYGEN SATURATION: 95 % | RESPIRATION RATE: 18 BRPM | TEMPERATURE: 98.6 F

## 2022-09-16 DIAGNOSIS — M79.605 ACUTE LEG PAIN, LEFT: Primary | ICD-10-CM

## 2022-09-16 LAB
ALBUMIN SERPL-MCNC: 3.5 G/DL (ref 3.5–5)
ALBUMIN/GLOB SERPL: 0.7 {RATIO} (ref 1.1–2.2)
ALP SERPL-CCNC: 330 U/L (ref 45–117)
ALT SERPL-CCNC: 16 U/L (ref 12–78)
ANION GAP SERPL CALC-SCNC: 11 MMOL/L (ref 5–15)
AST SERPL-CCNC: 37 U/L (ref 15–37)
BASOPHILS # BLD: 0.1 K/UL (ref 0–0.1)
BASOPHILS NFR BLD: 1 % (ref 0–1)
BILIRUB SERPL-MCNC: 0.6 MG/DL (ref 0.2–1)
BUN SERPL-MCNC: 36 MG/DL (ref 6–20)
BUN/CREAT SERPL: 4 (ref 12–20)
CALCIUM SERPL-MCNC: 6.8 MG/DL (ref 8.5–10.1)
CHLORIDE SERPL-SCNC: 93 MMOL/L (ref 97–108)
CO2 SERPL-SCNC: 27 MMOL/L (ref 21–32)
CREAT SERPL-MCNC: 8.46 MG/DL (ref 0.7–1.3)
DIFFERENTIAL METHOD BLD: ABNORMAL
EOSINOPHIL # BLD: 0.3 K/UL (ref 0–0.4)
EOSINOPHIL NFR BLD: 2 % (ref 0–7)
ERYTHROCYTE [DISTWIDTH] IN BLOOD BY AUTOMATED COUNT: 15.9 % (ref 11.5–14.5)
GLOBULIN SER CALC-MCNC: 5 G/DL (ref 2–4)
GLUCOSE SERPL-MCNC: 86 MG/DL (ref 65–100)
HCT VFR BLD AUTO: 29 % (ref 36.6–50.3)
HGB BLD-MCNC: 9.2 G/DL (ref 12.1–17)
IMM GRANULOCYTES # BLD AUTO: 0 K/UL (ref 0–0.04)
IMM GRANULOCYTES NFR BLD AUTO: 0 % (ref 0–0.5)
LYMPHOCYTES # BLD: 1.6 K/UL (ref 0.8–3.5)
LYMPHOCYTES NFR BLD: 12 % (ref 12–49)
MCH RBC QN AUTO: 29.5 PG (ref 26–34)
MCHC RBC AUTO-ENTMCNC: 31.7 G/DL (ref 30–36.5)
MCV RBC AUTO: 92.9 FL (ref 80–99)
MONOCYTES # BLD: 1.6 K/UL (ref 0–1)
MONOCYTES NFR BLD: 12 % (ref 5–13)
NEUTS SEG # BLD: 9.3 K/UL (ref 1.8–8)
NEUTS SEG NFR BLD: 73 % (ref 32–75)
NRBC # BLD: 0 K/UL (ref 0–0.01)
NRBC BLD-RTO: 0 PER 100 WBC
PLATELET # BLD AUTO: 159 K/UL (ref 150–400)
PMV BLD AUTO: 10.2 FL (ref 8.9–12.9)
POTASSIUM SERPL-SCNC: 3.6 MMOL/L (ref 3.5–5.1)
PROT SERPL-MCNC: 8.5 G/DL (ref 6.4–8.2)
RBC # BLD AUTO: 3.12 M/UL (ref 4.1–5.7)
SODIUM SERPL-SCNC: 131 MMOL/L (ref 136–145)
WBC # BLD AUTO: 12.8 K/UL (ref 4.1–11.1)

## 2022-09-16 PROCEDURE — 80053 COMPREHEN METABOLIC PANEL: CPT

## 2022-09-16 PROCEDURE — 85025 COMPLETE CBC W/AUTO DIFF WBC: CPT

## 2022-09-16 PROCEDURE — 96374 THER/PROPH/DIAG INJ IV PUSH: CPT

## 2022-09-16 PROCEDURE — 93971 EXTREMITY STUDY: CPT

## 2022-09-16 PROCEDURE — 74011250636 HC RX REV CODE- 250/636: Performed by: EMERGENCY MEDICINE

## 2022-09-16 PROCEDURE — 36415 COLL VENOUS BLD VENIPUNCTURE: CPT

## 2022-09-16 PROCEDURE — 99284 EMERGENCY DEPT VISIT MOD MDM: CPT

## 2022-09-16 RX ORDER — FENTANYL CITRATE 50 UG/ML
50 INJECTION, SOLUTION INTRAMUSCULAR; INTRAVENOUS
Status: COMPLETED | OUTPATIENT
Start: 2022-09-16 | End: 2022-09-16

## 2022-09-16 RX ADMIN — FENTANYL CITRATE 50 MCG: 50 INJECTION, SOLUTION INTRAMUSCULAR; INTRAVENOUS at 06:11

## 2022-09-16 NOTE — ED PROVIDER NOTES
EMERGENCY DEPARTMENT HISTORY AND PHYSICAL EXAM     ------------------------------------------------------------------------------------------------------  Please note that this dictation was completed with [employer], the [employer] voice recognition software. Quite often unanticipated grammatical, syntax, homophones, and other interpretive errors are inadvertently transcribed by the computer software. Please disregard these errors. Please excuse any errors that have escaped final proofreading.  -----------------------------------------------------------------------------------------------------------------    Date: 9/16/2022  Patient Name: Sukhjinder Croft    History of Presenting Illness     Chief Complaint   Patient presents with    Leg Pain     Patient ambulatory to triage for constant L leg pain that started 4 hours ago. Denies any trauma to leg. Appears slightly larger than R leg. Patient has had blood clots in this leg in 2013, not on blood thinners. Dialysis MWF       History Provided By: Patient    HPI: Sukhjinder Croft is a 42 y.o. male, with significant pmhx of hypertension, end-stage renal disease on dialysis Monday/Wednesday/Friday, hypertension, chronic abdominal pain, previous DVT, seizure disorder, CAD who presents via EMS to the ED with c/o left lower calf pain that started earlier this morning that woke him up out of sleep with aching throbbing type pain. Notes having similar pain with previous DVT and expressed concern for the same. Did not take any medication over-the-counter to alleviate his symptoms. Specifically requesting fentanyl for his pain on arrival.  Pt also specifically denies any recent fevers, chills, CP, SOB, nausea, vomiting, diarrhea, abd pain, changes in BM, urinary sxs, or headache. PCP: Amber Rosales MD    Social Hx: denies tobacco, denies EtOH, denies recreational/ Illicit Drugs     There are no other complaints, changes, or physical findings at this time.      Allergies Allergen Reactions    Shellfish Containing Products Swelling     Break out in rash, trouble breathing    Contrast Agent [Iodine] Shortness of Breath    Heparin Analogues Other (comments)     Positive history of HIT         Current Facility-Administered Medications   Medication Dose Route Frequency Provider Last Rate Last Admin    sodium chloride 0.9 % bolus infusion 1,000 mL  1,000 mL IntraVENous NOW Erinn Wilson MD         Current Outpatient Medications   Medication Sig Dispense Refill    ondansetron (ZOFRAN ODT) 4 mg disintegrating tablet Take 1 Tablet by mouth every eight (8) hours as needed for Nausea or Vomiting. 10 Tablet 0    sevelamer carbonate (Renvela) 800 mg tab tab Take 1,600 mg by mouth three (3) times daily. ondansetron hcl (Zofran) 4 mg tablet Take 1 Tablet by mouth every twelve (12) hours as needed for Nausea or Vomiting. 20 Tablet 0    dicyclomine (BENTYL) 20 mg tablet Take 1 Tablet by mouth every six (6) hours as needed for Abdominal Cramps. 20 Tablet 0    metoprolol succinate (TOPROL-XL) 25 mg XL tablet Take 12.5 mg by mouth daily. pantoprazole (PROTONIX) 40 mg tablet Take 1 Tablet by mouth Before breakfast and dinner. 60 Tablet 2    sucralfate (CARAFATE) 1 gram tablet Take 1 Tablet by mouth Before breakfast, lunch, dinner and at bedtime. 120 Tablet 2    polyethylene glycol (MIRALAX) 17 gram packet Take 1 Packet by mouth daily. 30 Packet 0    propranoloL (INDERAL) 10 mg tablet Take 10 mg by mouth two (2) times a day. (Patient not taking: Reported on 4/18/2022)      Vitamin D2 1,250 mcg (50,000 unit) capsule Take 50,000 Units by mouth every seven (7) days. calcium acetate,phosphat bind, (PHOSLO) 667 mg cap Take 2 Caps by mouth three (3) times daily (with meals). Indications: low amount of calcium in the blood, renal osteodystrophy with hyperphosphatemia (Patient not taking: Reported on 4/18/2022) 120 Cap 0    atorvastatin (Lipitor) 20 mg tablet Take 20 mg by mouth daily. acetaminophen (TYLENOL) 500 mg tablet Take 1 Tab by mouth every four (4) hours as needed for Pain. Over the counter 30 Tab 0    calcitRIOL (ROCALTROL) 0.5 mcg capsule Take 0.5 mcg by mouth daily. Past History     Past Medical History:  Past Medical History:   Diagnosis Date    Abdominal hematoma     AICD (automatic cardioverter/defibrillator) present     CAD (coronary artery disease)     anterior MI s/p 2 stents  2007    Chronic abdominal pain     Chronic kidney disease     ESRD; Dialysis dependent.  [facility] [address] home x5 days week M-F does labs    DVT (deep venous thrombosis) ([address] 2001    DVT of popliteal vein ([address] 11/2011    not anticoagulated due to bleeding, IVC filter    Endocarditis     Gallstone pancreatitis     Gastrointestinal disorder     acid reflux    Gastrointestinal disorder     peptic ulcer    Hemodialysis patient ([address]     High cholesterol     HIT (heparin-induced thrombocytopenia) ([address]     Hypertension     Kidney transplant     b/l kidneys 1995, 2001    Long term current use of anticoagulant therapy     Nephrotic syndrome     Other ill-defined conditions(799.89)     kidny transplant x2,  on dialysis    Other ill-defined conditions(799.89)     home dialysis    Other ill-defined conditions(799.89)     hx recurrent left leg DVT    Peritonitis ([address]     Seizures ([address] 2015    most recent- only had three in lifetime    Small bowel obstruction (HCC)     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    [facility])        Past Surgical History:  Past Surgical History:   Procedure Laterality Date    HX APPENDECTOMY      HX CHOLECYSTECTOMY      HX OTHER SURGICAL  11/2011    exploratory laparotomy    HX OTHER SURGICAL      fem-pop    HX OTHER SURGICAL  02/2013    right upper extremity fistula    HX PACEMAKER Left 6/2009    AICD-st latonya-not connected    HX PACEMAKER Left     AICD-left side-BS-working    HX SMALL BOWEL RESECTION      HX TRANSPLANT  2001     Kidney    HX TRANSPLANT  1992    kidney    HX VASCULAR ACCESS Right     femoral access for HD- does 5 day dialysis @ home/left upper arm graft active, right upper arm occluded    IR INSERT NON TUNL CVC OVER 5 YRS  12/7/2021    IR INSERT NON TUNL CVC OVER 5 YRS  12/10/2021    IR INSERT TIPS HEPATIC SHUNT  12/30/2021    IR REMOVE TUNL CVAD W/O PORT / PUMP  10/29/2020    IR REPLACE CVC TUNNELED W/O PORT  9/10/2020    UT CARDIAC SURG PROCEDURE UNLIST  2007    2 stents    UT EDG US EXAM SURGICAL ALTER STOM DUODENUM/JEJUNUM  7/15/2011         UT ESOPHAGOGASTRODUODENOSCOPY TRANSORAL DIAGNOSTIC  5/24/2012         UPPER GI ENDOSCOPY,CTRL BLEED  12/6/2021         UPPER GI ENDOSCOPY,DIAGNOSIS  12/8/2021         UPPER GI ENDOSCOPY,DIAGNOSIS  4/20/2022         VASCULAR SURGERY PROCEDURE UNLIST  11/14/2017    Insertion AV graft right upper arm (bovine); ligation of AV fistula right arm       Family History:  Family History   Problem Relation Age of Onset    COPD Mother     Lung Disease Mother     Cancer Father         stomach    Cancer Maternal Grandmother        Social History:  Social History     Tobacco Use    Smoking status: Never    Smokeless tobacco: Never   Vaping Use    Vaping Use: Never used   Substance Use Topics    Alcohol use: No    Drug use: Never       Allergies: Allergies   Allergen Reactions    Shellfish Containing Products Swelling     Break out in rash, trouble breathing    Contrast Agent [Iodine] Shortness of Breath    Heparin Analogues Other (comments)     Positive history of HIT         Review of Systems   Review of Systems   Constitutional:  Negative for chills and fever. HENT: Negative. Eyes: Negative. Respiratory:  Negative for cough, chest tightness and shortness of breath. Cardiovascular:  Negative for chest pain and leg swelling. Gastrointestinal:  Negative for abdominal pain, diarrhea, nausea and vomiting. Endocrine: Negative. Genitourinary:  Negative for difficulty urinating and dysuria. Musculoskeletal:  Positive for myalgias. Skin: Negative. Neurological: Negative. Psychiatric/Behavioral: Negative. All other systems reviewed and are negative. Physical Exam   Physical Exam  Vitals and nursing note reviewed. Constitutional:       General: He is not in acute distress. Appearance: He is well-developed. He is not diaphoretic. HENT:      Head: Normocephalic and atraumatic. Nose: Nose normal.      Mouth/Throat:      Pharynx: No oropharyngeal exudate. Eyes:      Conjunctiva/sclera: Conjunctivae normal.      Pupils: Pupils are equal, round, and reactive to light. Neck:      Vascular: No JVD. Cardiovascular:      Rate and Rhythm: Normal rate and regular rhythm. Heart sounds: Normal heart sounds. No murmur heard. No friction rub. Comments: Fistula to LUE with palpable thrill  Pulmonary:      Effort: Pulmonary effort is normal. No respiratory distress. Breath sounds: Normal breath sounds. No stridor. No wheezing or rales. Abdominal:      General: Bowel sounds are normal. There is no distension. Palpations: Abdomen is soft. Tenderness: There is no abdominal tenderness. There is no rebound. Musculoskeletal:         General: No tenderness. Normal range of motion. Cervical back: Normal range of motion and neck supple. Skin:     General: Skin is warm and dry. Findings: No rash. Neurological:      Mental Status: He is alert and oriented to person, place, and time. Cranial Nerves: No cranial nerve deficit. Psychiatric:         Speech: Speech normal.         Behavior: Behavior normal.         Thought Content:  Thought content normal.         Judgment: Judgment normal.         Diagnostic Study Results     Labs -     Recent Results (from the past 12 hour(s))   CBC WITH AUTOMATED DIFF    Collection Time: 09/16/22  4:32 AM   Result Value Ref Range    WBC 12.8 (H) 4.1 - 11.1 K/uL    RBC 3.12 (L) 4.10 - 5.70 M/uL    HGB 9.2 (L) 12.1 - 17.0 g/dL    HCT 29.0 (L) 36.6 - 50.3 %    MCV 92.9 80.0 - 99.0 FL    MCH 29.5 26.0 - 34.0 PG    MCHC 31.7 30.0 - 36.5 g/dL    RDW 15.9 (H) 11.5 - 14.5 %    PLATELET 105 970 - 057 K/uL    MPV 10.2 8.9 - 12.9 FL    NRBC 0.0 0  WBC    ABSOLUTE NRBC 0.00 0.00 - 0.01 K/uL    NEUTROPHILS 73 32 - 75 %    LYMPHOCYTES 12 12 - 49 %    MONOCYTES 12 5 - 13 %    EOSINOPHILS 2 0 - 7 %    BASOPHILS 1 0 - 1 %    IMMATURE GRANULOCYTES 0 0.0 - 0.5 %    ABS. NEUTROPHILS 9.3 (H) 1.8 - 8.0 K/UL    ABS. LYMPHOCYTES 1.6 0.8 - 3.5 K/UL    ABS. MONOCYTES 1.6 (H) 0.0 - 1.0 K/UL    ABS. EOSINOPHILS 0.3 0.0 - 0.4 K/UL    ABS. BASOPHILS 0.1 0.0 - 0.1 K/UL    ABS. IMM. GRANS. 0.0 0.00 - 0.04 K/UL    DF AUTOMATED     METABOLIC PANEL, COMPREHENSIVE    Collection Time: 09/16/22  4:32 AM   Result Value Ref Range    Sodium 131 (L) 136 - 145 mmol/L    Potassium 3.6 3.5 - 5.1 mmol/L    Chloride 93 (L) 97 - 108 mmol/L    CO2 27 21 - 32 mmol/L    Anion gap 11 5 - 15 mmol/L    Glucose 86 65 - 100 mg/dL    BUN 36 (H) 6 - 20 MG/DL    Creatinine 8.46 (H) 0.70 - 1.30 MG/DL    BUN/Creatinine ratio 4 (L) 12 - 20      GFR est AA 8 (L) >60 ml/min/1.73m2    GFR est non-AA 7 (L) >60 ml/min/1.73m2    Calcium 6.8 (L) 8.5 - 10.1 MG/DL    Bilirubin, total 0.6 0.2 - 1.0 MG/DL    ALT (SGPT) 16 12 - 78 U/L    AST (SGOT) 37 15 - 37 U/L    Alk. phosphatase 330 (H) 45 - 117 U/L    Protein, total 8.5 (H) 6.4 - 8.2 g/dL    Albumin 3.5 3.5 - 5.0 g/dL    Globulin 5.0 (H) 2.0 - 4.0 g/dL    A-G Ratio 0.7 (L) 1.1 - 2.2         Radiologic Studies -   DUPLEX LOWER EXT VENOUS LEFT   Final Result        CT Results  (Last 48 hours)      None          CXR Results  (Last 48 hours)      None              Medical Decision Making   I am the first provider for this patient. I reviewed the vital signs, available nursing notes, past medical history, past surgical history, family history and social history. Vital Signs-Reviewed the patient's vital signs.   Patient Vitals for the past 12 hrs:   Temp Pulse Resp BP SpO2   09/16/22 0538 -- 95 18 95/67 --   09/16/22 0416 98.6 °F (37 °C) 100 20 (!) 89/63 95 %       Pulse Oximetry Analysis - 95% on RA Normal    Records Reviewed/Interpretted: Nursing Notes from triage and Old Medical Record, noting previous evaluation for abdominal pain and constipation earlier this month    Provider Notes (Medical Decision Making):     DDX:  DVT, musculoskeletal pain    Plan:  Duplex study, analgesic    Impression:  Leg pain    ED Course:   Initial assessment performed. The patients presenting problems have been discussed, and they are in agreement with the care plan formulated and outlined with them. I have encouraged them to ask questions as they arise throughout their visit. I reviewed our electronic medical record system for any past medical records that were available that may contribute to the patients current condition, the nursing notes and and vital signs from today's visit  Nursing notes will be reviewed as they become available in realtime while the pt has been in the ED. Sheryle Keepers, MD      I personally reviewed/interpreted pt's imaging. Agree with official read by radiology as noted above. Sheryle Keepers, MD      6:06 AM  Progress note:  Pt noted to be feeling better, ready for discharge. Discussed imaging findings with pt, specifically noting no DVT. Pt will follow up with primary care and dialysis this morning as instructed. All questions have been answered, pt voiced understanding and agreement with plan. Specific return precautions provided in addition to instructions for pt to return to the ED immediately should sx worsen at any time. Sheryle Keepers, MD             Critical Care Time:     none      Diagnosis     Clinical Impression:   1. Acute leg pain, left        PLAN:  1. Discharge Medication List as of 9/16/2022  6:04 AM        2.    Follow-up Information       Follow up With Specialties Details Why Contact Joss Moser Timothy Ferreira MD Family Medicine Schedule an appointment as soon as possible for a visit in 2 days  [facility]  [phone]      [facility] EMERGENCY DEPT Emergency Medicine  As needed [address]  [address] [facility]  [phone]          Return to ED if worse     Disposition:    6:07 AM   The patient's results have been reviewed with family and/or caregiver. They verbally convey their understanding and agreement of the patient's signs, symptoms, diagnosis, treatment and prognosis and additionally agree to follow up as recommended in the discharge instructions or to return to the Emergency Room should the patient's condition change prior to their follow-up appointment. The family and/or caregiver verbally agrees with the care-plan and all of their questions have been answered. The discharge instructions have also been provided to the them with educational information regarding the patient's diagnosis as well a list of reasons why the patient would want to return to the ER prior to their follow-up appointment should their condition change.   Ángel Benitez MD

## 2022-09-16 NOTE — DISCHARGE INSTRUCTIONS
It was a pleasure taking care of you in our Emergency Department today. We know that when you come to [facility], you are entrusting us with your health, comfort, and safety. Our physicians and nurses honor that trust, and truly appreciate the opportunity to care for you and your loved ones. We also value your feedback. If you receive a survey about your Emergency Department experience today, please fill it out. We care about our patients' feedback, and we listen to what you have to say. Thank you!       Dr. Kaelyn Hedrick MD.

## 2022-09-19 ENCOUNTER — HOSPITAL ENCOUNTER (INPATIENT)
Age: 47
LOS: 6 days | Discharge: NURSING, REHAB OR LONG-TERM CARE | DRG: 252 | End: 2022-09-25
Attending: SURGERY | Admitting: SURGERY
Payer: MEDICARE

## 2022-09-19 ENCOUNTER — ANESTHESIA (OUTPATIENT)
Dept: SURGERY | Age: 47
DRG: 252 | End: 2022-09-19
Payer: MEDICARE

## 2022-09-19 ENCOUNTER — ANESTHESIA EVENT (OUTPATIENT)
Dept: SURGERY | Age: 47
DRG: 252 | End: 2022-09-19
Payer: MEDICARE

## 2022-09-19 DIAGNOSIS — I70.229 CRITICAL LOWER LIMB ISCHEMIA (HCC): Primary | ICD-10-CM

## 2022-09-19 DIAGNOSIS — I73.9 PAD (PERIPHERAL ARTERY DISEASE) (HCC): ICD-10-CM

## 2022-09-19 LAB
ABO + RH BLD: NORMAL
ANION GAP SERPL CALC-SCNC: 16 MMOL/L (ref 5–15)
APTT PPP: 34 SEC (ref 22.1–31)
BASE DEFICIT BLD-SCNC: 3.2 MMOL/L
BLOOD GROUP ANTIBODIES SERPL: NORMAL
BUN SERPL-MCNC: 54 MG/DL (ref 6–20)
BUN/CREAT SERPL: 5 (ref 12–20)
CA-I BLD-MCNC: 0.84 MMOL/L (ref 1.12–1.32)
CALCIUM SERPL-MCNC: 7.8 MG/DL (ref 8.5–10.1)
CHLORIDE BLD-SCNC: 98 MMOL/L (ref 100–108)
CHLORIDE SERPL-SCNC: 95 MMOL/L (ref 97–108)
CO2 BLD-SCNC: 24 MMOL/L (ref 19–24)
CO2 SERPL-SCNC: 21 MMOL/L (ref 21–32)
CREAT SERPL-MCNC: 11.4 MG/DL (ref 0.7–1.3)
CREAT UR-MCNC: 12.8 MG/DL (ref 0.6–1.3)
GLUCOSE BLD STRIP.AUTO-MCNC: 106 MG/DL (ref 65–117)
GLUCOSE BLD STRIP.AUTO-MCNC: 49 MG/DL (ref 74–106)
GLUCOSE BLD STRIP.AUTO-MCNC: 61 MG/DL (ref 65–117)
GLUCOSE BLD STRIP.AUTO-MCNC: 65 MG/DL (ref 65–117)
GLUCOSE BLD STRIP.AUTO-MCNC: 73 MG/DL (ref 65–117)
GLUCOSE BLD STRIP.AUTO-MCNC: 88 MG/DL (ref 65–117)
GLUCOSE SERPL-MCNC: 65 MG/DL (ref 65–100)
HCO3 BLDA-SCNC: 24 MMOL/L
INR PPP: 1.2 (ref 0.9–1.1)
LACTATE BLD-SCNC: 2.64 MMOL/L (ref 0.4–2)
PCO2 BLDV: 48.2 MMHG (ref 41–51)
PH BLDV: 7.3 [PH] (ref 7.32–7.42)
PO2 BLDV: 21 MMHG (ref 25–40)
POTASSIUM BLD-SCNC: 5.1 MMOL/L (ref 3.5–5.5)
POTASSIUM SERPL-SCNC: 3.8 MMOL/L (ref 3.5–5.1)
PROTHROMBIN TIME: 12.7 SEC (ref 9–11.1)
SERVICE CMNT-IMP: ABNORMAL
SERVICE CMNT-IMP: NORMAL
SODIUM BLD-SCNC: 128 MMOL/L (ref 136–145)
SODIUM SERPL-SCNC: 132 MMOL/L (ref 136–145)
SPECIMEN EXP DATE BLD: NORMAL
SPECIMEN SITE: ABNORMAL
THERAPEUTIC RANGE,PTTT: ABNORMAL SECS (ref 58–77)

## 2022-09-19 PROCEDURE — 36415 COLL VENOUS BLD VENIPUNCTURE: CPT

## 2022-09-19 PROCEDURE — 77030013838 HC OCCL INT FLRST BAXT -B: Performed by: SURGERY

## 2022-09-19 PROCEDURE — 74011000250 HC RX REV CODE- 250: Performed by: NURSE ANESTHETIST, CERTIFIED REGISTERED

## 2022-09-19 PROCEDURE — 77030031139 HC SUT VCRL2 J&J -A: Performed by: SURGERY

## 2022-09-19 PROCEDURE — 74011250637 HC RX REV CODE- 250/637: Performed by: SURGERY

## 2022-09-19 PROCEDURE — 74011250636 HC RX REV CODE- 250/636: Performed by: NURSE ANESTHETIST, CERTIFIED REGISTERED

## 2022-09-19 PROCEDURE — 2709999900 HC NON-CHARGEABLE SUPPLY: Performed by: SURGERY

## 2022-09-19 PROCEDURE — 74011250636 HC RX REV CODE- 250/636: Performed by: ANESTHESIOLOGY

## 2022-09-19 PROCEDURE — 77030002996 HC SUT SLK J&J -A: Performed by: SURGERY

## 2022-09-19 PROCEDURE — 86900 BLOOD TYPING SEROLOGIC ABO: CPT

## 2022-09-19 PROCEDURE — 77030014008 HC SPNG HEMSTAT J&J -C: Performed by: SURGERY

## 2022-09-19 PROCEDURE — 76060000037 HC ANESTHESIA 3 TO 3.5 HR: Performed by: SURGERY

## 2022-09-19 PROCEDURE — 74011000258 HC RX REV CODE- 258: Performed by: SURGERY

## 2022-09-19 PROCEDURE — 77030005513 HC CATH URETH FOL11 MDII -B: Performed by: SURGERY

## 2022-09-19 PROCEDURE — 85730 THROMBOPLASTIN TIME PARTIAL: CPT

## 2022-09-19 PROCEDURE — 77030019908 HC STETH ESOPH SIMS -A: Performed by: ANESTHESIOLOGY

## 2022-09-19 PROCEDURE — 77030026438 HC STYL ET INTUB CARD -A: Performed by: NURSE ANESTHETIST, CERTIFIED REGISTERED

## 2022-09-19 PROCEDURE — 77030013079 HC BLNKT BAIR HGGR 3M -A: Performed by: ANESTHESIOLOGY

## 2022-09-19 PROCEDURE — 74011250636 HC RX REV CODE- 250/636: Performed by: SURGERY

## 2022-09-19 PROCEDURE — 85610 PROTHROMBIN TIME: CPT

## 2022-09-19 PROCEDURE — 77030008463 HC STPLR SKN PROX J&J -B: Performed by: SURGERY

## 2022-09-19 PROCEDURE — 77030008684 HC TU ET CUF COVD -B: Performed by: NURSE ANESTHETIST, CERTIFIED REGISTERED

## 2022-09-19 PROCEDURE — 77030002987 HC SUT PROL J&J -B: Performed by: SURGERY

## 2022-09-19 PROCEDURE — 82947 ASSAY GLUCOSE BLOOD QUANT: CPT

## 2022-09-19 PROCEDURE — 77030038692 HC WND DEB SYS IRMX -B: Performed by: SURGERY

## 2022-09-19 PROCEDURE — C1768 GRAFT, VASCULAR: HCPCS | Performed by: SURGERY

## 2022-09-19 PROCEDURE — 77030031753 HC SHR ENDO COAG HARM J&J -E: Performed by: SURGERY

## 2022-09-19 PROCEDURE — 77030002986 HC SUT PROL J&J -A: Performed by: SURGERY

## 2022-09-19 PROCEDURE — 82962 GLUCOSE BLOOD TEST: CPT

## 2022-09-19 PROCEDURE — 041L0KQ BYPASS LEFT FEMORAL ARTERY TO LOWER EXTREMITY ARTERY WITH NONAUTOLOGOUS TISSUE SUBSTITUTE, OPEN APPROACH: ICD-10-PCS | Performed by: SURGERY

## 2022-09-19 PROCEDURE — 76010000173 HC OR TIME 3 TO 3.5 HR INTENSV-TIER 1: Performed by: SURGERY

## 2022-09-19 PROCEDURE — 65270000046 HC RM TELEMETRY

## 2022-09-19 PROCEDURE — 76210000016 HC OR PH I REC 1 TO 1.5 HR: Performed by: SURGERY

## 2022-09-19 PROCEDURE — C1757 CATH, THROMBECTOMY/EMBOLECT: HCPCS | Performed by: SURGERY

## 2022-09-19 PROCEDURE — 80048 BASIC METABOLIC PNL TOTAL CA: CPT

## 2022-09-19 PROCEDURE — 74011250636 HC RX REV CODE- 250/636: Performed by: STUDENT IN AN ORGANIZED HEALTH CARE EDUCATION/TRAINING PROGRAM

## 2022-09-19 DEVICE — IMPLANTABLE DEVICE: Type: IMPLANTABLE DEVICE | Site: LEG | Status: FUNCTIONAL

## 2022-09-19 RX ORDER — ROCURONIUM BROMIDE 10 MG/ML
INJECTION, SOLUTION INTRAVENOUS AS NEEDED
Status: DISCONTINUED | OUTPATIENT
Start: 2022-09-19 | End: 2022-09-19 | Stop reason: HOSPADM

## 2022-09-19 RX ORDER — PANTOPRAZOLE SODIUM 40 MG/1
40 TABLET, DELAYED RELEASE ORAL
Status: DISCONTINUED | OUTPATIENT
Start: 2022-09-20 | End: 2022-09-25 | Stop reason: HOSPADM

## 2022-09-19 RX ORDER — ETOMIDATE 2 MG/ML
INJECTION INTRAVENOUS AS NEEDED
Status: DISCONTINUED | OUTPATIENT
Start: 2022-09-19 | End: 2022-09-19 | Stop reason: HOSPADM

## 2022-09-19 RX ORDER — DEXAMETHASONE SODIUM PHOSPHATE 4 MG/ML
INJECTION, SOLUTION INTRA-ARTICULAR; INTRALESIONAL; INTRAMUSCULAR; INTRAVENOUS; SOFT TISSUE AS NEEDED
Status: DISCONTINUED | OUTPATIENT
Start: 2022-09-19 | End: 2022-09-19 | Stop reason: HOSPADM

## 2022-09-19 RX ORDER — HYDROMORPHONE HYDROCHLORIDE 1 MG/ML
1 INJECTION, SOLUTION INTRAMUSCULAR; INTRAVENOUS; SUBCUTANEOUS ONCE
Status: DISCONTINUED | OUTPATIENT
Start: 2022-09-19 | End: 2022-09-19

## 2022-09-19 RX ORDER — HYDROMORPHONE HYDROCHLORIDE 1 MG/ML
0.5 INJECTION, SOLUTION INTRAMUSCULAR; INTRAVENOUS; SUBCUTANEOUS ONCE
Status: COMPLETED | OUTPATIENT
Start: 2022-09-19 | End: 2022-09-19

## 2022-09-19 RX ORDER — HYDROMORPHONE HYDROCHLORIDE 1 MG/ML
2 INJECTION, SOLUTION INTRAMUSCULAR; INTRAVENOUS; SUBCUTANEOUS
Status: DISCONTINUED | OUTPATIENT
Start: 2022-09-19 | End: 2022-09-20

## 2022-09-19 RX ORDER — FENTANYL CITRATE 50 UG/ML
25 INJECTION, SOLUTION INTRAMUSCULAR; INTRAVENOUS
Status: CANCELLED | OUTPATIENT
Start: 2022-09-19

## 2022-09-19 RX ORDER — SODIUM CHLORIDE, SODIUM LACTATE, POTASSIUM CHLORIDE, CALCIUM CHLORIDE 600; 310; 30; 20 MG/100ML; MG/100ML; MG/100ML; MG/100ML
50 INJECTION, SOLUTION INTRAVENOUS CONTINUOUS
Status: DISCONTINUED | OUTPATIENT
Start: 2022-09-19 | End: 2022-09-19 | Stop reason: HOSPADM

## 2022-09-19 RX ORDER — METOPROLOL SUCCINATE 25 MG/1
12.5 TABLET, EXTENDED RELEASE ORAL DAILY
Status: DISCONTINUED | OUTPATIENT
Start: 2022-09-20 | End: 2022-09-25 | Stop reason: HOSPADM

## 2022-09-19 RX ORDER — CEFAZOLIN SODIUM 1 G/3ML
2 INJECTION, POWDER, FOR SOLUTION INTRAMUSCULAR; INTRAVENOUS ONCE
Status: COMPLETED | OUTPATIENT
Start: 2022-09-19 | End: 2022-09-19

## 2022-09-19 RX ORDER — FENTANYL CITRATE 50 UG/ML
INJECTION, SOLUTION INTRAMUSCULAR; INTRAVENOUS
Status: DISCONTINUED
Start: 2022-09-19 | End: 2022-09-20

## 2022-09-19 RX ORDER — SODIUM CHLORIDE, SODIUM LACTATE, POTASSIUM CHLORIDE, CALCIUM CHLORIDE 600; 310; 30; 20 MG/100ML; MG/100ML; MG/100ML; MG/100ML
25 INJECTION, SOLUTION INTRAVENOUS CONTINUOUS
Status: DISCONTINUED | OUTPATIENT
Start: 2022-09-19 | End: 2022-09-19 | Stop reason: HOSPADM

## 2022-09-19 RX ORDER — HYDROMORPHONE HYDROCHLORIDE 1 MG/ML
0.25 INJECTION, SOLUTION INTRAMUSCULAR; INTRAVENOUS; SUBCUTANEOUS ONCE
Status: COMPLETED | OUTPATIENT
Start: 2022-09-19 | End: 2022-09-19

## 2022-09-19 RX ORDER — SODIUM CHLORIDE 9 MG/ML
25 INJECTION, SOLUTION INTRAVENOUS CONTINUOUS
Status: DISCONTINUED | OUTPATIENT
Start: 2022-09-20 | End: 2022-09-19 | Stop reason: HOSPADM

## 2022-09-19 RX ORDER — MIDAZOLAM HYDROCHLORIDE 1 MG/ML
INJECTION, SOLUTION INTRAMUSCULAR; INTRAVENOUS AS NEEDED
Status: DISCONTINUED | OUTPATIENT
Start: 2022-09-19 | End: 2022-09-19 | Stop reason: HOSPADM

## 2022-09-19 RX ORDER — HYDROMORPHONE HYDROCHLORIDE 1 MG/ML
1 INJECTION, SOLUTION INTRAMUSCULAR; INTRAVENOUS; SUBCUTANEOUS
Status: DISCONTINUED | OUTPATIENT
Start: 2022-09-19 | End: 2022-09-21

## 2022-09-19 RX ORDER — OXYCODONE AND ACETAMINOPHEN 5; 325 MG/1; MG/1
2 TABLET ORAL
Status: DISCONTINUED | OUTPATIENT
Start: 2022-09-19 | End: 2022-09-20

## 2022-09-19 RX ORDER — NEOSTIGMINE METHYLSULFATE 1 MG/ML
INJECTION, SOLUTION INTRAVENOUS AS NEEDED
Status: DISCONTINUED | OUTPATIENT
Start: 2022-09-19 | End: 2022-09-19 | Stop reason: HOSPADM

## 2022-09-19 RX ORDER — SODIUM CHLORIDE 0.9 % (FLUSH) 0.9 %
5-40 SYRINGE (ML) INJECTION AS NEEDED
Status: CANCELLED | OUTPATIENT
Start: 2022-09-19

## 2022-09-19 RX ORDER — CALCIUM ACETATE 667 MG/1
2 CAPSULE ORAL
Status: DISCONTINUED | OUTPATIENT
Start: 2022-09-20 | End: 2022-09-25 | Stop reason: HOSPADM

## 2022-09-19 RX ORDER — HYDROMORPHONE HYDROCHLORIDE 1 MG/ML
0.2 INJECTION, SOLUTION INTRAMUSCULAR; INTRAVENOUS; SUBCUTANEOUS
Status: CANCELLED | OUTPATIENT
Start: 2022-09-19

## 2022-09-19 RX ORDER — ONDANSETRON 2 MG/ML
INJECTION INTRAMUSCULAR; INTRAVENOUS AS NEEDED
Status: DISCONTINUED | OUTPATIENT
Start: 2022-09-19 | End: 2022-09-19 | Stop reason: HOSPADM

## 2022-09-19 RX ORDER — SEVELAMER CARBONATE 800 MG/1
1600 TABLET, FILM COATED ORAL 3 TIMES DAILY
Status: DISCONTINUED | OUTPATIENT
Start: 2022-09-20 | End: 2022-09-25 | Stop reason: HOSPADM

## 2022-09-19 RX ORDER — FENTANYL CITRATE 50 UG/ML
INJECTION, SOLUTION INTRAMUSCULAR; INTRAVENOUS AS NEEDED
Status: DISCONTINUED | OUTPATIENT
Start: 2022-09-19 | End: 2022-09-19 | Stop reason: HOSPADM

## 2022-09-19 RX ORDER — CALCITRIOL 0.25 UG/1
0.5 CAPSULE ORAL DAILY
Status: DISCONTINUED | OUTPATIENT
Start: 2022-09-20 | End: 2022-09-25 | Stop reason: HOSPADM

## 2022-09-19 RX ORDER — SODIUM CHLORIDE 0.9 % (FLUSH) 0.9 %
5-40 SYRINGE (ML) INJECTION EVERY 8 HOURS
Status: CANCELLED | OUTPATIENT
Start: 2022-09-19

## 2022-09-19 RX ORDER — ONDANSETRON 2 MG/ML
4 INJECTION INTRAMUSCULAR; INTRAVENOUS
Status: DISCONTINUED | OUTPATIENT
Start: 2022-09-19 | End: 2022-09-25 | Stop reason: HOSPADM

## 2022-09-19 RX ORDER — ATORVASTATIN CALCIUM 20 MG/1
20 TABLET, FILM COATED ORAL DAILY
Status: DISCONTINUED | OUTPATIENT
Start: 2022-09-20 | End: 2022-09-25 | Stop reason: HOSPADM

## 2022-09-19 RX ORDER — ACETAMINOPHEN 325 MG/1
650 TABLET ORAL
Status: DISCONTINUED | OUTPATIENT
Start: 2022-09-19 | End: 2022-09-25 | Stop reason: HOSPADM

## 2022-09-19 RX ORDER — SODIUM CHLORIDE 0.9 % (FLUSH) 0.9 %
5-40 SYRINGE (ML) INJECTION EVERY 8 HOURS
Status: DISCONTINUED | OUTPATIENT
Start: 2022-09-19 | End: 2022-09-19 | Stop reason: HOSPADM

## 2022-09-19 RX ORDER — LIDOCAINE HYDROCHLORIDE 10 MG/ML
0.1 INJECTION, SOLUTION EPIDURAL; INFILTRATION; INTRACAUDAL; PERINEURAL AS NEEDED
Status: DISCONTINUED | OUTPATIENT
Start: 2022-09-19 | End: 2022-09-19 | Stop reason: HOSPADM

## 2022-09-19 RX ORDER — GLYCOPYRROLATE 0.2 MG/ML
INJECTION INTRAMUSCULAR; INTRAVENOUS AS NEEDED
Status: DISCONTINUED | OUTPATIENT
Start: 2022-09-19 | End: 2022-09-19 | Stop reason: HOSPADM

## 2022-09-19 RX ORDER — FENTANYL CITRATE 50 UG/ML
50 INJECTION, SOLUTION INTRAMUSCULAR; INTRAVENOUS AS NEEDED
Status: DISCONTINUED | OUTPATIENT
Start: 2022-09-19 | End: 2022-09-19 | Stop reason: HOSPADM

## 2022-09-19 RX ORDER — DEXTROSE 50 % IN WATER (D50W) INTRAVENOUS SYRINGE
AS NEEDED
Status: DISCONTINUED | OUTPATIENT
Start: 2022-09-19 | End: 2022-09-19 | Stop reason: HOSPADM

## 2022-09-19 RX ORDER — FACIAL-BODY WIPES
10 EACH TOPICAL DAILY PRN
Status: DISCONTINUED | OUTPATIENT
Start: 2022-09-19 | End: 2022-09-25 | Stop reason: HOSPADM

## 2022-09-19 RX ORDER — SODIUM CHLORIDE 0.9 % (FLUSH) 0.9 %
5-40 SYRINGE (ML) INJECTION AS NEEDED
Status: DISCONTINUED | OUTPATIENT
Start: 2022-09-19 | End: 2022-09-19 | Stop reason: HOSPADM

## 2022-09-19 RX ORDER — ONDANSETRON 2 MG/ML
4 INJECTION INTRAMUSCULAR; INTRAVENOUS AS NEEDED
Status: CANCELLED | OUTPATIENT
Start: 2022-09-19

## 2022-09-19 RX ORDER — DEXTROSE 50 % IN WATER (D50W) INTRAVENOUS SYRINGE
Status: COMPLETED
Start: 2022-09-19 | End: 2022-09-19

## 2022-09-19 RX ADMIN — DEXTROSE MONOHYDRATE 25 ML: 25 INJECTION, SOLUTION INTRAVENOUS at 19:50

## 2022-09-19 RX ADMIN — FENTANYL CITRATE 25 MCG: 50 INJECTION INTRAMUSCULAR; INTRAVENOUS at 21:57

## 2022-09-19 RX ADMIN — DEXTROSE MONOHYDRATE 25 ML: 25 INJECTION, SOLUTION INTRAVENOUS at 21:22

## 2022-09-19 RX ADMIN — FENTANYL CITRATE 25 MCG: 50 INJECTION INTRAMUSCULAR; INTRAVENOUS at 20:43

## 2022-09-19 RX ADMIN — ROCURONIUM BROMIDE 50 MG: 10 INJECTION INTRAVENOUS at 20:05

## 2022-09-19 RX ADMIN — MIDAZOLAM 2 MG: 1 INJECTION INTRAMUSCULAR; INTRAVENOUS at 15:10

## 2022-09-19 RX ADMIN — Medication 3 AMPULE: at 14:34

## 2022-09-19 RX ADMIN — FENTANYL CITRATE 25 MCG: 50 INJECTION INTRAMUSCULAR; INTRAVENOUS at 20:04

## 2022-09-19 RX ADMIN — FENTANYL CITRATE 50 MCG: 50 INJECTION INTRAMUSCULAR; INTRAVENOUS at 15:10

## 2022-09-19 RX ADMIN — Medication 2 MG: at 22:45

## 2022-09-19 RX ADMIN — DEXAMETHASONE SODIUM PHOSPHATE 8 MG: 4 INJECTION, SOLUTION INTRAMUSCULAR; INTRAVENOUS at 20:13

## 2022-09-19 RX ADMIN — MIDAZOLAM 1 MG: 1 INJECTION INTRAMUSCULAR; INTRAVENOUS at 15:12

## 2022-09-19 RX ADMIN — SODIUM CHLORIDE 25 ML/HR: 9 INJECTION, SOLUTION INTRAVENOUS at 14:35

## 2022-09-19 RX ADMIN — HYDROMORPHONE HYDROCHLORIDE 0.25 MG: 1 INJECTION, SOLUTION INTRAMUSCULAR; INTRAVENOUS; SUBCUTANEOUS at 19:05

## 2022-09-19 RX ADMIN — FENTANYL CITRATE 50 MCG: 50 INJECTION INTRAMUSCULAR; INTRAVENOUS at 15:16

## 2022-09-19 RX ADMIN — FENTANYL CITRATE 25 MCG: 50 INJECTION, SOLUTION INTRAMUSCULAR; INTRAVENOUS at 23:25

## 2022-09-19 RX ADMIN — GLYCOPYRROLATE 0.4 MG: 0.2 INJECTION, SOLUTION INTRAMUSCULAR; INTRAVENOUS at 22:45

## 2022-09-19 RX ADMIN — FENTANYL CITRATE 50 MCG: 50 INJECTION INTRAMUSCULAR; INTRAVENOUS at 17:00

## 2022-09-19 RX ADMIN — FENTANYL CITRATE 25 MCG: 50 INJECTION INTRAMUSCULAR; INTRAVENOUS at 21:51

## 2022-09-19 RX ADMIN — ONDANSETRON HYDROCHLORIDE 4 MG: 2 INJECTION, SOLUTION INTRAMUSCULAR; INTRAVENOUS at 22:39

## 2022-09-19 RX ADMIN — DEXTROSE MONOHYDRATE 25 ML: 25 INJECTION, SOLUTION INTRAVENOUS at 20:33

## 2022-09-19 RX ADMIN — ETOMIDATE 15 MG: 2 INJECTION, SOLUTION INTRAVENOUS at 20:04

## 2022-09-19 RX ADMIN — HYDROMORPHONE HYDROCHLORIDE 0.5 MG: 1 INJECTION, SOLUTION INTRAMUSCULAR; INTRAVENOUS; SUBCUTANEOUS at 18:29

## 2022-09-19 RX ADMIN — SODIUM CHLORIDE 40 MCG/MIN: 900 INJECTION, SOLUTION INTRAVENOUS at 20:10

## 2022-09-19 RX ADMIN — MIDAZOLAM 1 MG: 1 INJECTION INTRAMUSCULAR; INTRAVENOUS at 15:17

## 2022-09-19 RX ADMIN — CEFAZOLIN 2 G: 1 INJECTION, POWDER, FOR SOLUTION INTRAMUSCULAR; INTRAVENOUS; PARENTERAL at 20:08

## 2022-09-19 RX ADMIN — MIDAZOLAM 1 MG: 1 INJECTION INTRAMUSCULAR; INTRAVENOUS at 15:14

## 2022-09-19 RX ADMIN — BIVALIRUDIN 0.5 MG/KG/HR: 250 INJECTION, POWDER, LYOPHILIZED, FOR SOLUTION INTRAVENOUS at 21:12

## 2022-09-19 NOTE — ANESTHESIA PREPROCEDURE EVALUATION
Relevant Problems   RESPIRATORY SYSTEM   (+) Pneumonia      CARDIOVASCULAR   (+) CAD (coronary artery disease)   (+) Congestive heart failure, unspecified   (+) Coronary atherosclerosis of native coronary artery   (+) HTN (hypertension)      RENAL FAILURE   (+) ESRD (end stage renal disease) (HCC)   (+) ESRD (end stage renal disease) on dialysis (HCC)      HEMATOLOGY   (+) Anemia       Anesthetic History     PONV          Review of Systems / Medical History  Patient summary reviewed, nursing notes reviewed and pertinent labs reviewed    Pulmonary  Within defined limits                 Neuro/Psych     seizures         Cardiovascular    Hypertension      CHF  Dysrhythmias : SVT  Pacemaker, CAD and hyperlipidemia    Exercise tolerance: >4 METS  Comments: TTE EF 35%, mild MR, mod TR, mod pulm htn   Hx DVT    GI/Hepatic/Renal         Renal disease: ESRD and dialysis       Endo/Other  Within defined limits           Other Findings   Comments: Gastroparesis  Esophageal varicies, large   Kidney transplant  AICD  Hx HIT          Physical Exam    Airway  Mallampati: II  TM Distance: 4 - 6 cm  Neck ROM: normal range of motion   Mouth opening: Normal     Cardiovascular  Regular rate and rhythm,  S1 and S2 normal,  no murmur, click, rub, or gallop  Rhythm: regular  Rate: normal         Dental    Dentition: Poor dentition     Pulmonary  Breath sounds clear to auscultation               Abdominal  GI exam deferred       Other Findings            Anesthetic Plan    ASA: 4  Anesthesia type: general    Monitoring Plan: Arterial line      Induction: Intravenous  Anesthetic plan and risks discussed with: Patient      +- central line if unreliable peripheral venous access

## 2022-09-19 NOTE — ANESTHESIA PROCEDURE NOTES
Arterial Line Placement    Start time: 9/19/2022 3:10 PM  End time: 9/19/2022 3:23 PM  Performed by: Cathryne Boast, MD  Authorized by: Cathryne Boast, MD     Pre-Procedure  Indications:  Arterial pressure monitoring  Preanesthetic Checklist: patient identified, risks and benefits discussed, anesthesia consent, site marked and timeout performed    Timeout Time: 15:10 EDT      Procedure:   Prep:  ChloraPrep  Seldinger Technique?: Yes    Orientation:  Right  Location:  Radial artery  Catheter size:  20 G  Number of attempts:  1  Cont Cardiac Output Sensor: No      Assessment:   Post-procedure:  Line secured and sterile dressing applied  Patient Tolerance:  Patient tolerated the procedure well with no immediate complications  Comment:   Risks, benefits, alternatives explained and patient agrees to proceed. Sterile prep with Chlorhexidine. 0.5 mL 1% Lidocaine placed at insertion site.

## 2022-09-19 NOTE — H&P
History and Physical    Subjective:     Naomi Merino is a 42 y.o. male who has an occluded 6year old left popliteal to anterior tibial artery bypass with severe rest pain of the foot. Past Medical History:   Diagnosis Date    Abdominal hematoma     AICD (automatic cardioverter/defibrillator) present     CAD (coronary artery disease)     anterior MI s/p 2 stents  2007    Chronic abdominal pain     Chronic kidney disease     ESRD; Dialysis dependent.  [facility] home x5 days week M-F does labs    DVT (deep venous thrombosis) ([address] 2001    DVT of popliteal vein ([address] 11/2011    not anticoagulated due to bleeding, IVC filter    Endocarditis     Gallstone pancreatitis     Gastrointestinal disorder     acid reflux    Gastrointestinal disorder     peptic ulcer    Hemodialysis patient ([address]     High cholesterol     HIT (heparin-induced thrombocytopenia) ([address]     Hypertension     Kidney transplant     b/l kidneys 1995, 2001    Long term current use of anticoagulant therapy     Nephrotic syndrome     Other ill-defined conditions(799.89)     kidny transplant x2,  on dialysis    Other ill-defined conditions(799.89)     home dialysis    Other ill-defined conditions(799.89)     hx recurrent left leg DVT    Peritonitis ([address]     Seizures ([address] 2015    most recent- only had three in lifetime    Small bowel obstruction (HCC)     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    V-tach [facility])       Past Surgical History:   Procedure Laterality Date    HX APPENDECTOMY      HX CHOLECYSTECTOMY      HX OTHER SURGICAL  11/2011    exploratory laparotomy    HX OTHER SURGICAL      fem-pop    HX OTHER SURGICAL  02/2013    right upper extremity fistula    HX PACEMAKER Left 6/2009    AICD-st latonya-not connected    HX PACEMAKER Left     AICD-left side-BS-working    HX SMALL BOWEL RESECTION      HX TRANSPLANT  2001     Kidney    HX TRANSPLANT  1992    kidney    HX VASCULAR ACCESS Right     femoral access for HD- does 5 day dialysis @ home/left upper arm graft active, right upper arm occluded    IR INSERT NON TUNL CVC OVER 5 YRS  12/7/2021    IR INSERT NON TUNL CVC OVER 5 YRS  12/10/2021    IR INSERT TIPS HEPATIC SHUNT  12/30/2021    IR REMOVE TUNL CVAD W/O PORT / PUMP  10/29/2020    IR REPLACE CVC TUNNELED W/O PORT  9/10/2020    AK CARDIAC SURG PROCEDURE UNLIST  2007    2 stents    AK EDG US EXAM SURGICAL ALTER STOM DUODENUM/JEJUNUM  7/15/2011         AK ESOPHAGOGASTRODUODENOSCOPY TRANSORAL DIAGNOSTIC  5/24/2012         UPPER GI ENDOSCOPY,CTRL BLEED  12/6/2021         UPPER GI ENDOSCOPY,DIAGNOSIS  12/8/2021         UPPER GI ENDOSCOPY,DIAGNOSIS  4/20/2022         VASCULAR SURGERY PROCEDURE UNLIST  11/14/2017    Insertion AV graft right upper arm (bovine); ligation of AV fistula right arm     Family History   Problem Relation Age of Onset    COPD Mother     Lung Disease Mother     Cancer Father         stomach    Cancer Maternal Grandmother       Social History     Tobacco Use    Smoking status: Never    Smokeless tobacco: Never   Substance Use Topics    Alcohol use: No       Prior to Admission medications    Medication Sig Start Date End Date Taking? Authorizing Provider   sevelamer carbonate (RENVELA) 800 mg tab tab Take 1,600 mg by mouth three (3) times daily. Yes Provider, Historical   metoprolol succinate (TOPROL-XL) 25 mg XL tablet Take 12.5 mg by mouth daily. 1/26/22  Yes Provider, Historical   pantoprazole (PROTONIX) 40 mg tablet Take 1 Tablet by mouth Before breakfast and dinner. 1/5/22  Yes Renuka Medina NP   propranoloL (INDERAL) 10 mg tablet Take 10 mg by mouth two (2) times a day. 12/24/21  Yes Provider, Historical   Vitamin D2 1,250 mcg (50,000 unit) capsule Take 50,000 Units by mouth every seven (7) days. 10/15/21  Yes Provider, Historical   calcium acetate,phosphat bind, (PHOSLO) 667 mg cap Take 2 Caps by mouth three (3) times daily (with meals).  Indications: low amount of calcium in the blood, renal osteodystrophy with hyperphosphatemia 11/10/20  Yes Franca Dupree MD   atorvastatin (LIPITOR) 20 mg tablet Take 20 mg by mouth daily. Yes Provider, Historical   calcitRIOL (ROCALTROL) 0.5 mcg capsule Take 0.5 mcg by mouth daily. Yes Provider, Historical   ondansetron (ZOFRAN ODT) 4 mg disintegrating tablet Take 1 Tablet by mouth every eight (8) hours as needed for Nausea or Vomiting. Patient not taking: Reported on 9/19/2022 8/16/22   Nandini Kaye DO   ondansetron hcl (Zofran) 4 mg tablet Take 1 Tablet by mouth every twelve (12) hours as needed for Nausea or Vomiting. Patient not taking: Reported on 9/19/2022 4/6/22   Alexa Toth MD   dicyclomine (BENTYL) 20 mg tablet Take 1 Tablet by mouth every six (6) hours as needed for Abdominal Cramps. Patient not taking: Reported on 9/19/2022 4/6/22   Alexa Toth MD   sucralfate (CARAFATE) 1 gram tablet Take 1 Tablet by mouth Before breakfast, lunch, dinner and at bedtime. Patient not taking: Reported on 9/19/2022 1/5/22   Renuka Medina NP   polyethylene glycol (MIRALAX) 17 gram packet Take 1 Packet by mouth daily. Patient not taking: Reported on 9/19/2022 1/5/22   Radha Thurman MD   acetaminophen (TYLENOL) 500 mg tablet Take 1 Tab by mouth every four (4) hours as needed for Pain.  Over the counter  Patient not taking: Reported on 9/19/2022 1/20/19   Carol Rebollar MD     Allergies   Allergen Reactions    Shellfish Containing Products Swelling     Break out in rash, trouble breathing    Contrast Agent [Iodine] Shortness of Breath    Heparin Analogues Other (comments)     Positive history of HIT        Review of Systems:  Denies CP/SOB    Objective:     Physical Exam:   Visit Vitals  BP (!) 114/48 (BP 1 Location: Right leg, BP Patient Position: At rest)   Pulse (!) 114   Temp 98.5 °F (36.9 °C)   Resp 24   Ht 5' 7\" (1.702 m)   Wt 57.4 kg (126 lb 8.7 oz)   SpO2 100%   BMI 19.82 kg/m²     General:  Alert, cooperative, no distress, appears stated age.   Head:  Normocephalic, without obvious abnormality, atraumatic. Neck: Supple, symmetrical, trachea midline, no adenopathy, thyroid: no enlargement/tenderness/nodules, no carotid bruit and no JVD. Lungs:   Clear to auscultation bilaterally. Heart:  Regular rate and rhythm, S1, S2 normal, no murmur, click, rub or gallop. Abdomen:   Soft, non-tender. Bowel sounds normal. No masses,  No organomegaly. Rectal:  Normal tone, normal prostate, no masses or tenderness  Guaiac negative stool. Extremities: Lt foot cool   Pulses: 2+ and symmetric all extremities.            Neurologic: Reduced sensation in Lt foot, some movement       Assessment:     Critical limb ischemia of left foot      Plan:     Left femoral-anterior tibial bypass w/ cadaver vein    Signed By: Leonila Beasley MD     September 19, 2022

## 2022-09-19 NOTE — PERIOP NOTES
Patient updated on continued plan of care, schedule update. Repositioned for comfort. Answered questions at bedside. [phone] message left for sister. Attempting to update on plan of care and surgery schedule.

## 2022-09-19 NOTE — PERIOP NOTES
No covid test  o s/s/   no vaccine   takened. Wears mask occasionally    pt   had meoilex sacru border preventativey applied. Skin intact. Skin is extremely dry and flaky. Epoc done  to right    hand    k level at 5.1 however the glucose reading   at   ? 49. Pt is not a diabetic. Asymptomatic. Repeat   lab    went to drawfor additional labwork and to send to lab unable to obtain specimens. Dr. Governor Atkinson   contacted she ststes she will use   ultrasound to obtain lab. Accucheck  done  to double  check glucose in the mean time   and reading   at   65. Asymptomatic. Awaiting    lab to be   draw   by dr. Governor Atkinson. Pt is anuric  so urine specimen cannot be obtained will inform   md.   Pt needs  type and screen   and  pt/ptt and  repeated   epoc. Lab work.

## 2022-09-20 LAB
ALBUMIN SERPL-MCNC: 2.8 G/DL (ref 3.5–5)
ANION GAP SERPL CALC-SCNC: 10 MMOL/L (ref 5–15)
BASOPHILS # BLD: 0 K/UL (ref 0–0.1)
BASOPHILS NFR BLD: 0 % (ref 0–1)
BUN SERPL-MCNC: 71 MG/DL (ref 6–20)
BUN/CREAT SERPL: 5 (ref 12–20)
CALCIUM SERPL-MCNC: 7.7 MG/DL (ref 8.5–10.1)
CHLORIDE SERPL-SCNC: 94 MMOL/L (ref 97–108)
CO2 SERPL-SCNC: 26 MMOL/L (ref 21–32)
CREAT SERPL-MCNC: 13.3 MG/DL (ref 0.7–1.3)
DIFFERENTIAL METHOD BLD: ABNORMAL
EOSINOPHIL # BLD: 0 K/UL (ref 0–0.4)
EOSINOPHIL NFR BLD: 0 % (ref 0–7)
ERYTHROCYTE [DISTWIDTH] IN BLOOD BY AUTOMATED COUNT: 15 % (ref 11.5–14.5)
GLUCOSE SERPL-MCNC: 136 MG/DL (ref 65–100)
HCT VFR BLD AUTO: 26.2 % (ref 36.6–50.3)
HGB BLD-MCNC: 8.7 G/DL (ref 12.1–17)
IMM GRANULOCYTES # BLD AUTO: 0 K/UL (ref 0–0.04)
IMM GRANULOCYTES NFR BLD AUTO: 0 % (ref 0–0.5)
LYMPHOCYTES # BLD: 0.5 K/UL (ref 0.8–3.5)
LYMPHOCYTES NFR BLD: 6 % (ref 12–49)
MCH RBC QN AUTO: 30.2 PG (ref 26–34)
MCHC RBC AUTO-ENTMCNC: 33.2 G/DL (ref 30–36.5)
MCV RBC AUTO: 91 FL (ref 80–99)
MONOCYTES # BLD: 0.9 K/UL (ref 0–1)
MONOCYTES NFR BLD: 10 % (ref 5–13)
NEUTS SEG # BLD: 7.6 K/UL (ref 1.8–8)
NEUTS SEG NFR BLD: 84 % (ref 32–75)
NRBC # BLD: 0 K/UL (ref 0–0.01)
NRBC BLD-RTO: 0 PER 100 WBC
PHOSPHATE SERPL-MCNC: 6.2 MG/DL (ref 2.6–4.7)
PLATELET # BLD AUTO: 164 K/UL (ref 150–400)
PMV BLD AUTO: 10.2 FL (ref 8.9–12.9)
POTASSIUM SERPL-SCNC: 4.4 MMOL/L (ref 3.5–5.1)
RBC # BLD AUTO: 2.88 M/UL (ref 4.1–5.7)
RBC MORPH BLD: ABNORMAL
SODIUM SERPL-SCNC: 130 MMOL/L (ref 136–145)
WBC # BLD AUTO: 9 K/UL (ref 4.1–11.1)

## 2022-09-20 PROCEDURE — P9047 ALBUMIN (HUMAN), 25%, 50ML: HCPCS | Performed by: INTERNAL MEDICINE

## 2022-09-20 PROCEDURE — 97165 OT EVAL LOW COMPLEX 30 MIN: CPT

## 2022-09-20 PROCEDURE — 74011250637 HC RX REV CODE- 250/637: Performed by: NURSE PRACTITIONER

## 2022-09-20 PROCEDURE — 97530 THERAPEUTIC ACTIVITIES: CPT | Performed by: PHYSICAL THERAPIST

## 2022-09-20 PROCEDURE — 74011250637 HC RX REV CODE- 250/637: Performed by: SURGERY

## 2022-09-20 PROCEDURE — 97535 SELF CARE MNGMENT TRAINING: CPT

## 2022-09-20 PROCEDURE — 74011250636 HC RX REV CODE- 250/636: Performed by: INTERNAL MEDICINE

## 2022-09-20 PROCEDURE — 36415 COLL VENOUS BLD VENIPUNCTURE: CPT

## 2022-09-20 PROCEDURE — 65270000046 HC RM TELEMETRY

## 2022-09-20 PROCEDURE — 97161 PT EVAL LOW COMPLEX 20 MIN: CPT | Performed by: PHYSICAL THERAPIST

## 2022-09-20 PROCEDURE — 74011250636 HC RX REV CODE- 250/636: Performed by: SURGERY

## 2022-09-20 PROCEDURE — 5A1D70Z PERFORMANCE OF URINARY FILTRATION, INTERMITTENT, LESS THAN 6 HOURS PER DAY: ICD-10-PCS | Performed by: INTERNAL MEDICINE

## 2022-09-20 PROCEDURE — 85025 COMPLETE CBC W/AUTO DIFF WBC: CPT

## 2022-09-20 PROCEDURE — 80069 RENAL FUNCTION PANEL: CPT

## 2022-09-20 PROCEDURE — 90935 HEMODIALYSIS ONE EVALUATION: CPT

## 2022-09-20 RX ORDER — OXYCODONE AND ACETAMINOPHEN 5; 325 MG/1; MG/1
1-2 TABLET ORAL
Status: DISCONTINUED | OUTPATIENT
Start: 2022-09-20 | End: 2022-09-25 | Stop reason: HOSPADM

## 2022-09-20 RX ORDER — ALBUMIN HUMAN 250 G/1000ML
25 SOLUTION INTRAVENOUS
Status: DISCONTINUED | OUTPATIENT
Start: 2022-09-20 | End: 2022-09-25 | Stop reason: HOSPADM

## 2022-09-20 RX ORDER — ASPIRIN 81 MG/1
81 TABLET ORAL DAILY
Status: DISCONTINUED | OUTPATIENT
Start: 2022-09-20 | End: 2022-09-25 | Stop reason: HOSPADM

## 2022-09-20 RX ADMIN — SEVELAMER CARBONATE 1600 MG: 800 TABLET, FILM COATED ORAL at 08:11

## 2022-09-20 RX ADMIN — HYDROMORPHONE HYDROCHLORIDE 1 MG: 1 INJECTION, SOLUTION INTRAMUSCULAR; INTRAVENOUS; SUBCUTANEOUS at 15:44

## 2022-09-20 RX ADMIN — ALBUMIN (HUMAN) 25 G: 0.25 INJECTION, SOLUTION INTRAVENOUS at 16:38

## 2022-09-20 RX ADMIN — CALCIUM ACETATE 1334 MG: 667 CAPSULE ORAL at 21:00

## 2022-09-20 RX ADMIN — ERYTHROPOIETIN 20000 UNITS: 20000 INJECTION, SOLUTION INTRAVENOUS; SUBCUTANEOUS at 21:01

## 2022-09-20 RX ADMIN — CALCIUM ACETATE 1334 MG: 667 CAPSULE ORAL at 11:13

## 2022-09-20 RX ADMIN — Medication 1 AMPULE: at 08:28

## 2022-09-20 RX ADMIN — ASPIRIN 81 MG: 81 TABLET, COATED ORAL at 21:00

## 2022-09-20 RX ADMIN — Medication 1 AMPULE: at 21:05

## 2022-09-20 RX ADMIN — OXYCODONE AND ACETAMINOPHEN 2 TABLET: 5; 325 TABLET ORAL at 01:16

## 2022-09-20 RX ADMIN — PANTOPRAZOLE SODIUM 40 MG: 40 TABLET, DELAYED RELEASE ORAL at 08:11

## 2022-09-20 RX ADMIN — Medication 1 AMPULE: at 01:16

## 2022-09-20 RX ADMIN — OXYCODONE AND ACETAMINOPHEN 2 TABLET: 5; 325 TABLET ORAL at 09:56

## 2022-09-20 RX ADMIN — ATORVASTATIN CALCIUM 20 MG: 20 TABLET, FILM COATED ORAL at 08:11

## 2022-09-20 RX ADMIN — METOPROLOL SUCCINATE 12.5 MG: 25 TABLET, EXTENDED RELEASE ORAL at 08:11

## 2022-09-20 RX ADMIN — HYDROMORPHONE HYDROCHLORIDE 2 MG: 1 INJECTION, SOLUTION INTRAMUSCULAR; INTRAVENOUS; SUBCUTANEOUS at 07:28

## 2022-09-20 RX ADMIN — CALCIUM ACETATE 1334 MG: 667 CAPSULE ORAL at 08:11

## 2022-09-20 RX ADMIN — OXYCODONE AND ACETAMINOPHEN 2 TABLET: 5; 325 TABLET ORAL at 14:24

## 2022-09-20 RX ADMIN — CALCITRIOL CAPSULES 0.25 MCG 0.5 MCG: 0.25 CAPSULE ORAL at 08:11

## 2022-09-20 RX ADMIN — HYDROMORPHONE HYDROCHLORIDE 1 MG: 1 INJECTION, SOLUTION INTRAMUSCULAR; INTRAVENOUS; SUBCUTANEOUS at 01:58

## 2022-09-20 RX ADMIN — HYDROMORPHONE HYDROCHLORIDE 1 MG: 1 INJECTION, SOLUTION INTRAMUSCULAR; INTRAVENOUS; SUBCUTANEOUS at 10:58

## 2022-09-20 RX ADMIN — SEVELAMER CARBONATE 1600 MG: 800 TABLET, FILM COATED ORAL at 21:00

## 2022-09-20 RX ADMIN — HYDROMORPHONE HYDROCHLORIDE 1 MG: 1 INJECTION, SOLUTION INTRAMUSCULAR; INTRAVENOUS; SUBCUTANEOUS at 20:57

## 2022-09-20 RX ADMIN — OXYCODONE AND ACETAMINOPHEN 2 TABLET: 5; 325 TABLET ORAL at 22:43

## 2022-09-20 NOTE — PROGRESS NOTES
End of Shift Note    Bedside shift change report given to [address] [place] [address] (oncoming nurse) by Nohemi Dejesus RN (offgoing nurse). Report included the following information SBAR, Kardex, Intake/Output, Recent Results, and Cardiac Rhythm NSR    Shift worked:  7P     Shift summary and any significant changes:     Patient pain poorly managed     Concerns for physician to address:  See above     Zone phone for oncoming shift:   NA       Activity:  Activity Level: Bed Rest  Number times ambulated in hallways past shift: 0  Number of times OOB to chair past shift: 0    Cardiac:   Cardiac Monitoring: Yes      Cardiac Rhythm: Sinus Rhythm    Access:  Current line(s): PIV     Genitourinary:   Urinary status: anuric    Respiratory:   O2 Device: None (Room air)  Chronic home O2 use?: NO  Incentive spirometer at bedside: NO       GI:  Last Bowel Movement Date: 09/17/22  Current diet:  ADULT DIET Regular; Low Potassium (Less than 3000 mg/day); Low Phosphorus (Less than 1000 mg)  Passing flatus: YES  Tolerating current diet: YES       Pain Management:   Patient states pain is manageable on current regimen: NO    Skin:  Yong Score: 17  Interventions: float heels    Patient Safety:  Fall Score:  Total Score: 2  Interventions: pt to call before getting OOB  High Fall Risk: Yes    Length of Stay:  Expected LOS: - - -  Actual LOS: 1      Nohemi Dejesus RN

## 2022-09-20 NOTE — PROGRESS NOTES
Vascular Surgery Progress Note  Columba Mocks ACNP-BC  9/20/2022       Subjective:     Mr. Jen Cleveland is a 42 y.o. AAM with a pmhx significant for ESRF on home HD, CAD, HTN, HLD, systolic CHF s/p ICD placement, multiple DVTs currently w/ IVC filter (1/4/22), HIT, small bowel necrosis, GERD, and multiple gastric ulcers s/p TIPS (12/30/2022). He has not been on anticoagulation since his TIPS procedure. He is well known to the practice for the management of PAD and HD access. He currently has a LUE [place] aVG (9/2020). He has chronic central venous syndrome requiring multiple fistulograms (last 9/2/22). He has a remote hx of LLE bypass graft that recently occluded. He is admitted to the hospital s/p a left common femoral to anterior tibial artery bypass with cadaver vein (9/19/22). This am his rest pain is improved. His left foot is warm and perfused.       Nursing Data:     Patient Vitals for the past 24 hrs:   BP Temp Pulse Resp SpO2 Height Weight   09/20/22 0748 -- -- 87 -- -- -- --   09/20/22 0745 -- -- 93 18 96 % -- --   09/20/22 0730 -- -- 94 27 98 % -- --   09/20/22 0729 131/87 98 °F (36.7 °C) 96 23 99 % -- --   09/20/22 0715 -- -- 91 15 97 % -- --   09/20/22 0700 102/61 -- 90 16 95 % -- --   09/20/22 0645 -- -- 100 24 98 % -- --   09/20/22 0630 -- -- 87 16 97 % -- --   09/20/22 0615 -- -- 89 13 96 % -- --   09/20/22 0600 (!) 108/97 -- 88 14 96 % -- --   09/20/22 0545 -- -- 92 17 95 % -- --   09/20/22 0530 -- -- 91 13 94 % -- --   09/20/22 0515 -- -- 94 19 95 % -- --   09/20/22 0500 104/69 -- 91 14 94 % -- --   09/20/22 0445 -- -- 89 16 93 % -- --   09/20/22 0430 -- -- 96 15 95 % -- --   09/20/22 0415 -- -- (!) 103 21 97 % -- --   09/20/22 0400 (!) 109/49 98 °F (36.7 °C) 94 15 96 % -- --   09/20/22 0345 -- -- 95 12 95 % -- --   09/20/22 0330 -- -- 94 15 95 % -- --   09/20/22 0315 -- -- 95 17 96 % -- --   09/20/22 0300 (!) 123/59 -- 97 18 94 % -- --   09/20/22 0245 -- -- 98 16 93 % -- -- 09/20/22 0230 -- -- 97 15 93 % -- --   09/20/22 0215 -- -- 97 15 95 % -- --   09/20/22 0200 134/75 -- 100 15 92 % -- --   09/20/22 0145 -- -- 96 16 93 % -- --   09/20/22 0130 -- -- (!) 101 22 97 % -- --   09/20/22 0100 138/88 97.7 °F (36.5 °C) 100 -- -- -- --   09/20/22 0015 (!) 107/40 98 °F (36.7 °C) (!) 101 18 94 % -- --   09/20/22 0004 (!) 104/29 -- (!) 101 17 99 % -- --   09/20/22 0000 (!) 93/53 -- (!) 102 17 99 % -- --   09/19/22 2345 (!) 105/35 -- (!) 101 16 99 % -- --   09/19/22 2330 (!) 116/41 -- (!) 102 18 100 % -- --   09/19/22 2320 (!) 145/28 -- (!) 102 23 100 % -- --   09/19/22 2315 (!) 149/28 -- (!) 104 17 100 % -- --   09/19/22 2314 (!) 148/68 98.3 °F (36.8 °C) (!) 105 22 100 % -- --   09/19/22 2310 (!) 151/48 -- (!) 105 17 100 % -- --   09/19/22 2305 (!) 162/31 -- -- -- -- -- --   09/19/22 1932 (!) 133/49 -- (!) 114 21 99 % -- --   09/19/22 1829 (!) 125/48 -- (!) 110 16 100 % -- --   09/19/22 1802 -- -- (!) 113 23 100 % -- --   09/19/22 1709 96/60 -- (!) 114 15 99 % -- --   09/19/22 1604 (!) 109/48 -- (!) 115 26 100 % -- --   09/19/22 1545 (!) 114/48 -- (!) 114 24 100 % -- --   09/19/22 1540 (!) 114/49 -- (!) 113 20 100 % -- --   09/19/22 1535 98/70 -- (!) 114 19 100 % -- --   09/19/22 1530 (!) 120/46 -- (!) 115 18 99 % -- --   09/19/22 1346 (!) 150/94 98.5 °F (36.9 °C) 97 18 96 % 5' 7\" (1.702 m) 57.4 kg (126 lb 8.7 oz)         Intake/Output Summary (Last 24 hours) at 9/20/2022 9791  Last data filed at 9/19/2022 2305  Gross per 24 hour   Intake 250 ml   Output --   Net 250 ml       Exam:     Physical Exam  Constitutional:       Comments: Chronically ill appearing. Appears older than stated age. HENT:      Mouth/Throat:      Mouth: Mucous membranes are moist.   Eyes:      Pupils: Pupils are equal, round, and reactive to light. Cardiovascular:      Rate and Rhythm: Normal rate and regular rhythm.       Comments: Left foot is warm and perfused  Pulmonary:      Effort: Pulmonary effort is normal. No respiratory distress. Abdominal:      General: There is no distension. Palpations: Abdomen is soft. Musculoskeletal:         General: Normal range of motion. Skin:     Coloration: Skin is pale. Comments: Dressing to LLE dry and intact   Neurological:      Mental Status: He is alert. Mental status is at baseline. Lab Review:     .   Recent Results (from the past 24 hour(s))   BLOOD GAS,CHEM8,LACTIC ACID POC    Collection Time: 09/19/22  2:09 PM   Result Value Ref Range    Calcium, ionized (POC) 0.84 (L) 1.12 - 1.32 mmol/L    BICARBONATE 24 mmol/L    Base deficit (POC) 3.2 mmol/L    Sample source VENOUS BLOOD      CO2, POC 24 19 - 24 MMOL/L    Sodium,  (L) 136 - 145 MMOL/L    Potassium, POC 5.1 3.5 - 5.5 MMOL/L    Chloride, POC 98 (L) 100 - 108 MMOL/L    Glucose, POC 49 (LL) 74 - 106 MG/DL    Creatinine, POC 12.8 (H) 0.6 - 1.3 MG/DL    Lactic Acid (POC) 2.64 (HH) 0.40 - 2.00 mmol/L    pH, venous (POC) 7.30 (L) 7.32 - 7.42      pCO2, venous (POC) 48.2 41 - 51 MMHG    pO2, venous (POC) 21 (L) 25 - 40 mmHg   GLUCOSE, POC    Collection Time: 09/19/22  2:24 PM   Result Value Ref Range    Glucose (POC) 65 65 - 117 mg/dL    Performed by Yumi Romero    TYPE & SCREEN    Collection Time: 09/19/22  3:26 PM   Result Value Ref Range    Crossmatch Expiration 09/22/2022,2359     ABO/Rh(D) Jessica Mazariegos POSITIVE     Antibody screen NEG    PROTHROMBIN TIME + INR    Collection Time: 09/19/22  3:27 PM   Result Value Ref Range    INR 1.2 (H) 0.9 - 1.1      Prothrombin time 12.7 (H) 9.0 - 11.1 sec   PTT    Collection Time: 09/19/22  3:27 PM   Result Value Ref Range    aPTT 34.0 (H) 22.1 - 31.0 sec    aPTT, therapeutic range     58.0 - 06.9 SECS   METABOLIC PANEL, BASIC    Collection Time: 09/19/22  3:27 PM   Result Value Ref Range    Sodium 132 (L) 136 - 145 mmol/L    Potassium 3.8 3.5 - 5.1 mmol/L    Chloride 95 (L) 97 - 108 mmol/L    CO2 21 21 - 32 mmol/L    Anion gap 16 (H) 5 - 15 mmol/L    Glucose 65 65 - 100 mg/dL BUN 54 (H) 6 - 20 MG/DL    Creatinine 11.40 (H) 0.70 - 1.30 MG/DL    BUN/Creatinine ratio 5 (L) 12 - 20      GFR est AA 6 (L) >60 ml/min/1.73m2    GFR est non-AA 5 (L) >60 ml/min/1.73m2    Calcium 7.8 (L) 8.5 - 10.1 MG/DL   GLUCOSE, POC    Collection Time: 09/19/22  7:47 PM   Result Value Ref Range    Glucose (POC) 61 (L) 65 - 117 mg/dL    Performed by Ana Hahn \"Sandra\"    GLUCOSE, POC    Collection Time: 09/19/22  8:32 PM   Result Value Ref Range    Glucose (POC) 73 65 - 117 mg/dL    Performed by Sharon Craig (CRNA)    GLUCOSE, POC    Collection Time: 09/19/22  9:16 PM   Result Value Ref Range    Glucose (POC) 88 65 - 117 mg/dL    Performed by Sharon Craig (CRNA)    GLUCOSE, POC    Collection Time: 09/19/22 10:27 PM   Result Value Ref Range    Glucose (POC) 106 65 - 117 mg/dL    Performed by Florentin Charles (ANES)           Assessment/Plan:     Left leg critical limb ischemic   - left common femoral to anterior tibial artery bypass with cadaver vein (9/19/22). Mild leukocytosis  -demargination from procedure  Stable. Normalize today  OOB as tolerated. WBAT. Keep LLE elevated when not ambulating. Float heels at all times. Begin wound care in the am.    ESRF   -on home HD M-F via LUE [place] aVG  Hyponatremia  Anemia of chronic disease  -stable  Consult nephrology for HD today. Coronary artery disease   HFrEF w/ hx of ICD  Hypertension  Hyperlipidemia  -Euvolemic.  BP labile.   -Lipitor & metoprolol    Hx of recurrent GIB  Hx of multiple gastric ulcer  -s/p TIPS (12/30/21)  Hx of small bowel necrosis   GERD  -Protonix    VTE prophylaxis:  Hx of multiple DVTs   -currently w/ IVC filter (1/4/22)  Hx of HIT  -SCDs contraindicated in diffuse PAD  Encourage OOB     Disposition:   Home in 1-2 days

## 2022-09-20 NOTE — PROGRESS NOTES
Problem: Self Care Deficits Care Plan (Adult)  Goal: *Acute Goals and Plan of Care (Insert Text)  Description: FUNCTIONAL STATUS PRIOR TO ADMISSION: The patient reports being independent with ADLs and functional mobility. He owns a rollator which he has used in the past but recently was not using and AD. HOME SUPPORT: The patient lived with his sister. Occupational Therapy Goals  Initiated 9/20/2022  1. Patient will perform grooming with modified independence within 7 day(s). 2.  Patient will perform lower body dressing with minimal assistance/contact guard assist within 7 day(s). 3.  Patient will perform toilet transfers with modified independence within 7 day(s). 4.  Patient will perform all aspects of toileting with modified independence within 7 day(s). 5.  Patient will participate in upper extremity therapeutic exercise/activities with independence for 5 minutes within 7 day(s). 6.  Patient will utilize energy conservation techniques during functional activities with verbal cues within 7 day(s). Outcome: Progressing Towards Goal   OCCUPATIONAL THERAPY EVALUATION  Patient: Izabela Bullard (32 y.o. male)  Date: 9/20/2022  Primary Diagnosis: Critical lower limb ischemia (HCC) [I70.229]  Procedure(s) (LRB):  LEFT FEMORALTO ANTERIOR -TIBIAL BYPASS WITH CADAVER VEIN (Left) 1 Day Post-Op   Precautions:      ASSESSMENT  Based on the objective data described below, the patient presents with LLE pain, decreased LLE AROM and strength, impaired balance and functional mobility, and decreased activity tolerance following L femoral to anterior tibial bypass (POD 1). At baseline pt lives with his sister and reports he is independent with ADLs and functional mobility. He was received semisupine in bed. He reported 7/10 LLE pain at rest, nurse arrived and administered pain medication. He transferred supine>sit with min A and increased time, demo'd good sitting balance.  Using RW pt stood with min A x2 and performed stand pivot to chair, pt keeping L foot of floor for majority of transfer due to pain. Once seated in chair pt performed grooming ADLs with setup, and remained in chair with VSS and LEs elevated at end of session. At this time pt is functioning well below his baseline and will benefit from skilled therapy intervention to address the above noted impairments. Pending progress recommend rehab vs. [facility] therapy with increased assistance at discharge. Current Level of Function Impacting Discharge (ADLs/self-care): Min A x2 transfers, setup-total A ADLs    Functional Outcome Measure: The patient scored 50/100 on the Barthel Index outcome measure. Other factors to consider for discharge: fall risk, pain level, lives with his sister who works during the day, pt reports having 20 stairs to enter his home       PLAN :  Recommendations and Planned Interventions: self care training, functional mobility training, therapeutic exercise, balance training, therapeutic activities, endurance activities, patient education, home safety training, and family training/education    Frequency/Duration: Patient will be followed by occupational therapy 4 times a week to address goals. Recommendation for discharge: (in order for the patient to meet his/her long term goals)  Rehab vs. [facility] therapy with 24/7 assistance pending progress    This discharge recommendation:  Has not yet been discussed the attending provider and/or case management    IF patient discharges home will need the following DME: TBD       SUBJECTIVE:   Patient stated It's hurting more.     OBJECTIVE DATA SUMMARY:   HISTORY:   Past Medical History:   Diagnosis Date    Abdominal hematoma     AICD (automatic cardioverter/defibrillator) present     CAD (coronary artery disease)     anterior MI s/p 2 stents  2007    Chronic abdominal pain     Chronic kidney disease     ESRD; Dialysis dependent.  [place] [id number] home x5 days week M-F does labs    DVT (deep venous thrombosis) ([facility] 2001    DVT of popliteal vein ([address] 11/2011    not anticoagulated due to bleeding, IVC filter    Endocarditis     Gallstone pancreatitis     Gastrointestinal disorder     acid reflux    Gastrointestinal disorder     peptic ulcer    Hemodialysis patient ([address]     High cholesterol     HIT (heparin-induced thrombocytopenia) ([address]     Hypertension     Kidney transplant     b/l kidneys 1995, 2001    Long term current use of anticoagulant therapy     Nephrotic syndrome     Other ill-defined conditions(799.89)     kidny transplant x2,  on dialysis    Other ill-defined conditions(799.89)     home dialysis    Other ill-defined conditions(799.89)     hx recurrent left leg DVT    Peritonitis ([address]     Seizures ([address] 2015    most recent- only had three in lifetime    Small bowel obstruction (HCC)     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    V-tach ([facility] [address]      Past Surgical History:   Procedure Laterality Date    HX APPENDECTOMY      HX CHOLECYSTECTOMY      HX OTHER SURGICAL  11/2011    exploratory laparotomy    HX OTHER SURGICAL      fem-pop    HX OTHER SURGICAL  02/2013    right upper extremity fistula    HX PACEMAKER Left 6/2009    AICD-st latonya-not connected    HX PACEMAKER Left     AICD-left side-BS-working    HX SMALL BOWEL RESECTION      HX TRANSPLANT  2001     Kidney    HX TRANSPLANT  1992    kidney    HX VASCULAR ACCESS Right     femoral access for HD- does 5 day dialysis @ home/left upper arm graft active, right upper arm occluded    IR INSERT NON TUNL CVC OVER 5 YRS  12/7/2021    IR INSERT NON TUNL CVC OVER 5 YRS  12/10/2021    IR INSERT TIPS HEPATIC SHUNT  12/30/2021    IR REMOVE TUNL CVAD W/O PORT / PUMP  10/29/2020    IR REPLACE CVC TUNNELED W/O PORT  9/10/2020    OK CARDIAC SURG PROCEDURE UNLIST  2007    2 stents    OK EDG US EXAM SURGICAL ALTER STOM DUODENUM/JEJUNUM  7/15/2011         OK ESOPHAGOGASTRODUODENOSCOPY TRANSORAL DIAGNOSTIC  5/24/2012         UPPER GI ENDOSCOPY,CTRL BLEED  12/6/2021         UPPER GI ENDOSCOPY,DIAGNOSIS  12/8/2021         UPPER GI ENDOSCOPY,DIAGNOSIS  4/20/2022         VASCULAR SURGERY PROCEDURE UNLIST  11/14/2017    Insertion AV graft right upper arm (bovine); ligation of AV fistula right arm       Expanded or extensive additional review of patient history:     Home Situation  Home Environment: Private residence  # Steps to Enter: 24  Rails to Enter: Yes  Hand Rails : Right  One/Two Story Residence: One story  Living Alone: No (lives with sister)  Support Systems: Other Family Member(s) (Lives with sister: Glenys Francois)  Patient Expects to be Discharged to[de-identified] Home (May benefit from home health pending recommendation from therapy)  Current DME Used/Available at Home: Tabby José Luis, rollator, Shower chair  Tub or Shower Type: Tub/Shower combination    Hand dominance: Right    EXAMINATION OF PERFORMANCE DEFICITS:  Cognitive/Behavioral Status:  Neurologic State: Alert  Orientation Level: Oriented X4  Cognition: Appropriate decision making; Appropriate for age attention/concentration; Appropriate safety awareness; Follows commands          Hearing: Auditory  Auditory Impairment: None    Vision/Perceptual:                 Acuity: Within Defined Limits         Range of Motion:  AROM: Generally decreased, functional  PROM: Within functional limits      Strength:  Strength: Generally decreased, functional       Coordination:  Coordination: Within functional limits  Fine Motor Skills-Upper: Left Intact; Right Intact    Gross Motor Skills-Upper: Left Intact; Right Intact    Tone & Sensation:  Tone: Normal        Balance:  Sitting: Intact  Standing: Impaired; With support  Standing - Static: Fair  Standing - Dynamic : Fair    Functional Mobility and Transfers for ADLs:  Bed Mobility:  Supine to Sit: Minimum assistance    Transfers:  Sit to Stand: Minimum assistance; Adaptive equipment; Additional time;Assist x2  Stand to Sit: Minimum assistance  Bed to Chair: Minimum assistance; Adaptive equipment; Additional time;Assist x2    ADL Assessment:  Feeding: Independent    Oral Facial Hygiene/Grooming: Setup (supported sit)    Bathing: Moderate assistance    Type of Bath: Chlorhexidine (CHG)    Upper Body Dressing: Minimum assistance    Lower Body Dressing: Total assistance    Toileting: Total assistance          ADL Intervention and task modifications:       Grooming  Position Performed: Seated in chair  Washing Face: Set-up  Brushing Teeth: Set-up       Lower Body Dressing Assistance  Socks: Total assistance (dependent)       Functional Measure:    Barthel Index:  Bathin  Bladder: 10  Bowels: 10  Groomin  Dressin  Feeding: 10  Mobility: 0  Stairs: 0  Toilet Use: 5  Transfer (Bed to Chair and Back): 5  Total: 50/100      The Barthel ADL Index: Guidelines  1. The index should be used as a record of what a patient does, not as a record of what a patient could do. 2. The main aim is to establish degree of independence from any help, physical or verbal, however minor and for whatever reason. 3. The need for supervision renders the patient not independent. 4. A patient's performance should be established using the best available evidence. Asking the patient, friends/relatives and nurses are the usual sources, but direct observation and common sense are also important. However direct testing is not needed. 5. Usually the patient's performance over the preceding 24-48 hours is important, but occasionally longer periods will be relevant. 6. Middle categories imply that the patient supplies over 50 per cent of the effort. 7. Use of aids to be independent is allowed. Score Interpretation (from [address] [address])    Independent   60-79 Minimally independent   40-59 Partially dependent   20-39 Very dependent   <20 Totally dependent     -Edyta Harden., Barthel, D.W. (1965). Functional evaluation: the Barthel Index. [facility] ([address] (1997).  The Barthel activities of daily living index: self-reporting versus actual performance in the old (> or = 75 years). Journal of [address] [place] [id number]), [facility], KODYF, Elvis Poole.Tri. (1999). Measuring the change in disability after inpatient rehabilitation; comparison of the responsiveness of the Barthel Index and Functional [place] Measure. Journal of Neurology, Neurosurgery, and Psychiatry, 66(4), 294-087. ELEAZAR Brown, GAMAL Payton, & Bobby Garcia M.A. (2004) Assessment of post-stroke quality of life in cost-effectiveness studies: The usefulness of the Barthel Index and the EuroQoL-5D. Quality of Life Research, 15, 600-72        Occupational Therapy Evaluation Charge Determination   History Examination Decision-Making   LOW Complexity : Brief history review  MEDIUM Complexity : 3-5 performance deficits relating to physical, cognitive , or psychosocial skils that result in activity limitations and / or participation restrictions MEDIUM Complexity : Patient may present with comorbidities that affect occupational performnce. Miniml to moderate modification of tasks or assistance (eg, physical or verbal ) with assesment(s) is necessary to enable patient to complete evaluation       Based on the above components, the patient evaluation is determined to be of the following complexity level: LOW   Pain Rating:  Reported 7/10 LLE pain at rest, reported increase with mobility but did not quantify    Activity Tolerance:   Fair and limited by pain    After treatment patient left in no apparent distress:    Sitting in chair, Call bell within reach, and Bed / chair alarm activated, LEs elevated    COMMUNICATION/EDUCATION:   The patients plan of care was discussed with: Physical therapist and Registered nurse.      Home safety education was provided and the patient/caregiver indicated understanding., Patient/family have participated as able in goal setting and plan of care. , and Patient/family agree to work toward stated goals and plan of care. This patients plan of care is appropriate for delegation to CHRISTIN.     Thank you for this referral.  Monster Burnett, OT  Time Calculation: 34 mins

## 2022-09-20 NOTE — PROGRESS NOTES
Nephrology Progress Note  [facility] / [address] [facility] Zaynab Bruno, [address]  Phone - [phone]  Fax - [phone]                 Patient: Pearl Hernandez                   YOB: 1975        Date- 9/20/2022                      Admit Date: 9/19/2022  CC: Follow up for esrd          IMPRESSION & PLAN:    esrd- home hd- f/b [facility] nephrologist  Left leg critical limb ischemic - left common femoral to anterior tibial artery bypass with cadaver vein (9/19/22). Hyponatremia  anemia of ckd  Pl. Effusion  Hypertension  Sec. Hyperpara  H/o gastric ca  H/o GI bleed    PLAN-  Hemodialysis today  Epogen for anemia  We will continue hemodialysis every Tuesday Thursday Saturday     Subjective: Interval History:   -  . He is admitted to the hospital s/p a left common femoral to anterior tibial artery bypass with cadaver vein (9/19/22). His last dialysis was on Sunday  The patient is followed by  . @ [facility]. He is on home hemodialysis. Objective:   Vitals:    09/20/22 0729 09/20/22 0730 09/20/22 0745 09/20/22 0748   BP: 131/87      Pulse: 96 94 93 87   Resp: 23 27 18    Temp: 98 °F (36.7 °C)      SpO2: 99% 98% 96%    Weight:       Height:          09/19 0701 - 09/20 0700  In: 250 [I.V.:250]  Out: -   Last 3 Recorded Weights in this Encounter    09/19/22 1346   Weight: 57.4 kg (126 lb 8.7 oz)      Physical exam:    GEN: NAD  NECK- Supple, no mass  RESP: No wheezing, Clear b/l  CVS: S1,S2  RRR  NEURO: Normal speech, Non focal  PSYCH: Normal Mood  Left arm AV graft present    Chart reviewed. Pertinent Notes reviewed. Data Review :  Recent Labs     09/19/22  1527   *   K 3.8   CL 95*   CO2 21   BUN 54*   CREA 11.40*   GLU 65   CA 7.8*     No results for input(s): WBC, HGB, HCT, PLT, HGBEXT, HCTEXT, PLTEXT in the last 72 hours.   No results for input(s): FE, TIBC, PSAT, FERR in the last 72 hours.    Medication list  reviewed  Current Facility-Administered Medications   Medication Dose Route Frequency    alcohol 62% (NOZIN) nasal  1 Ampule  1 Ampule Topical Q12H    oxyCODONE-acetaminophen (PERCOCET) 5-325 mg per tablet 1-2 Tablet  1-2 Tablet Oral Q4H PRN    atorvastatin (LIPITOR) tablet 20 mg  20 mg Oral DAILY    calcitRIOL (ROCALTROL) capsule 0.5 mcg  0.5 mcg Oral DAILY    calcium acetate(phosphat bind) (PHOSLO) capsule 1,334 mg  2 Capsule Oral TID WITH MEALS    metoprolol succinate (TOPROL-XL) XL tablet 12.5 mg  12.5 mg Oral DAILY    pantoprazole (PROTONIX) tablet 40 mg  40 mg Oral ACB&D    sevelamer carbonate (RENVELA) tab 1,600 mg  1,600 mg Oral TID    bisacodyL (DULCOLAX) suppository 10 mg  10 mg Rectal DAILY PRN    ondansetron (ZOFRAN) injection 4 mg  4 mg IntraVENous Q6H PRN    HYDROmorphone (DILAUDID) injection 1 mg  1 mg IntraVENous Q3H PRN    acetaminophen (TYLENOL) tablet 650 mg  650 mg Oral Q4H PRN        Kaylee Frye MD  9/20/2022

## 2022-09-20 NOTE — PROGRESS NOTES
Care Management Interventions  PCP Verified by CM: Yes (Rob Hobson MD)  Palliative Care Criteria Met (RRAT>21 & CHF Dx)?: No (No MD order for Palliative Care)  Mode of Transport at Discharge: Self  Transition of Care Consult (CM Consult): Home Health, Discharge Planning  Discharge Durable Medical Equipment: No  Physical Therapy Consult: Yes  Occupational Therapy Consult: Yes  Speech Therapy Consult: No  Support Systems: Other Family Member(s) (Lives with sister: Sania Saravia)  [employer] Provided?: No  Discharge Location  Patient Expects to be Discharged to[de-identified] Home (May benefit from home health pending recommendation from therapy)     TELEPHONE CM INTERVIEW:    Spoke with the patient to review and discuss plan of care and disposition needs. Patient very pleasant. Patient confirmed demographics, insurance and emergency contact on file. Patient lives with his sister in a second story duplex which has 24 steps to enter the home. 4 steps on front porch and 20 steps to get to second floor. At baseline, patient is independent with ADLs. He has had skilled care/acute care rehab at [facility] rehab from a previous event. Long conversation with the patient and when asked about home health in the home, he stated that not at the present time. After reviewing the chart, noted that the patient has multiple co morbidities including being on dialysis, had renal transplant in 2012, and many more. He did not mention that he was on dialysis. When asked if therapy has worked with him yet, especially since he has that many steps to get inside the home but he stated that he didn't think that he would need therapy at this time. There is a referral for PT/OT, good thing. Encouraged him to contact his Care Manager if he should think of anything that he may need. In regards to DME, he stated that he has a rollator for use at his home but he does not use it. . He has an Advanced Directive but not on file(he thought one was on file but I could not locate it. He stated that he had made a change anyway) Asked him to have his sister bring the AD to the hospital so it can be scanned in the computer. He names his sister Elisha Duong as his [address] and his [address] [facility] as his [address] [facility]. 1st IMM notification letter obtained by Debi Rose on 9/19/22. Reason for Admission:   Left Femoral-anterior tibial bypass w/ cadaver vein                    RUR Score:   19%               PCP: First and Last name:   Anisha Montano MD    Name of Practice:  [facility] [address]   Are you a current patient: Yes/No:  YES   Approximate date of last visit:  3 months ago   Can you participate in a virtual visit if needed: YeS    Do you (patient/family) have any concerns for transition/discharge? Not at this time. Gave patient opportunity to ask questions. Plan for utilizing home health:    Will benefit from home health     Current Advanced Directive/Advance Care Plan:  Full Code      Healthcare Decision Maker:   Click here to complete [address] including selection of the Healthcare Decision Maker Relationship (ie \"Primary\")            Primary Decision MakerMleslieerasmo Cummings Sister - [phone]    Secondary Decision Maker: Megan Alejandro - Other Relative - [phone]    Transition of Care Plan:       Home with home health      Bran 13 (ACP) Conversation      Date of Conversation: 9/16/2022  Conducted with: Patient with Decision Making Capacity    Healthcare Decision Maker:     Primary Decision Maker: Sachi Juan Antonio Sister - [phone]    Secondary Decision Maker: Megan Alejandro - Other Relative - [phone]  Click here to complete [address] including selection of the Healthcare Decision Maker Relationship (ie \"Primary\")    Today we documented Decision Maker(s) consistent with Legal Next of Kin hierarchy. Content/Action Overview:    Has ACP document(s) NOT on file - requested patient to provide  Reviewed DNR/DNI and patient elects Full Code (Attempt Resuscitation)  Topics discussed: NA  Additional Comments: Encouraged to bring his AD to hospital for scanning      Length of Voluntary ACP Conversation in minutes:  30 minutes    Hiral Borjas

## 2022-09-20 NOTE — ANESTHESIA POSTPROCEDURE EVALUATION
Procedure(s):  LEFT FEMORALTO ANTERIOR -TIBIAL BYPASS WITH CADAVER VEIN. general    Anesthesia Post Evaluation        Patient location during evaluation: PACU  Note status: Adequate. Level of consciousness: responsive to verbal stimuli and sleepy but conscious  Pain management: satisfactory to patient  Airway patency: patent  Anesthetic complications: no  Cardiovascular status: acceptable  Respiratory status: acceptable  Hydration status: acceptable  Comments: +Post-Anesthesia Evaluation and Assessment    Patient: Leelee Mcconnell MRN: [id number]  SSN: [id number]   YOB: 1975  Age: 42 y.o. Sex: male      Cardiovascular Function/Vital Signs    BP (!) 104/29   Pulse (!) 101   Temp 36.8 °C (98.3 °F)   Resp 17   Ht 5' 7\" (1.702 m)   Wt 57.4 kg (126 lb 8.7 oz)   SpO2 99%   BMI 19.82 kg/m²     Patient is status post Procedure(s):  LEFT FEMORALTO ANTERIOR -TIBIAL BYPASS WITH CADAVER VEIN. Nausea/Vomiting: Controlled. Postoperative hydration reviewed and adequate. Pain:  Pain Scale 1: Visual (09/20/22 0000)  Pain Intensity 1: 0 (09/20/22 0000)   Managed. Neurological Status:   Neuro (WDL): Within Defined Limits (09/19/22 2314)   At baseline. Mental Status and Level of Consciousness: Arousable. Pulmonary Status:   O2 Device: None (Room air) (09/20/22 0004)   Adequate oxygenation and airway patent. Complications related to anesthesia: None    Post-anesthesia assessment completed. No concerns. Signed By: Millicent Pond MD    9/20/2022  Post anesthesia nausea and vomiting:  controlled      INITIAL Post-op Vital signs:   Vitals Value Taken Time   /40 09/20/22 0015   Temp 36.8 °C (98.3 °F) 09/19/22 2314   Pulse 102 09/20/22 0026   Resp 18 09/20/22 0026   SpO2 94 % 09/20/22 0026   Vitals shown include unvalidated device data.

## 2022-09-20 NOTE — CONSULTS
Blaise Leblanc         NAME:Claude Marnell Lango  TRV:[id number]   :1975     Pt seen  Plan for hd today  He has ESRD  He is on hd at home 5 day/week-   He is f/b [facility] nephrology      Critical lower limb ischemia [facility]) [I70.229]     Kathrine Schroeder MD  [facility]  [facility]  [facility] [address], [address] [facility]  Phone - [phone]         Fax - [phone] [facility] Office  [address] [address]  Phone - [phone]        Fax - [phone]

## 2022-09-20 NOTE — PROGRESS NOTES
0700: Bedside shift change report given to Brenna Lima RN and Sharlene Harris RN (oncoming nurse) by Freedom Alfaro RN (offgoing nurse). Report included the following information SBAR, Kardex, Intake/Output, MAR, and Recent Results. 0700: Sherri Cerna will be documenting on patient. 0845: Nephrology consult called at this time. 0900: Kimber vascular NP at bedside assessing patient. Dressing changes daily and elevate extremity. Pain control addressed and meds updated in the [facility]     1045: Dr. Markus Ramirez at bedside assessing patient, will plan for dialysis this afternoon. 1600: Dialysis being done at bedside. 1900: I have reviewed and agree with Sharlene Harris RN documentation. 1900: End of Shift Note    Bedside shift change report given to janice RN (oncoming nurse) by Pepe Cox (offgoing nurse). Report included the following information SBAR, Kardex, Intake/Output, MAR, and Recent Results    Shift worked:  [id number]     Shift summary and any significant changes:     Dialysis done, dressing change completed. Concerns for physician to address:  none     Zone phone for oncoming shift:   XXX       Activity:  Activity Level: Up with Assistance  Number times ambulated in hallways past shift: 0  Number of times OOB to chair past shift: 1    Cardiac:   Cardiac Monitoring: Yes      Cardiac Rhythm: Sinus Rhythm    Access:  Current line(s): PIV     Genitourinary:   Urinary status: anuric    Respiratory:   O2 Device: None (Room air)  Chronic home O2 use?: NO  Incentive spirometer at bedside: N/A       GI:  Last Bowel Movement Date: 09/17/22  Current diet:  ADULT DIET Regular; Low Potassium (Less than 3000 mg/day); Low Phosphorus (Less than 1000 mg)  Passing flatus: YES  Tolerating current diet: YES       Pain Management:   Patient states pain is manageable on current regimen: YES    Skin:  Yong Score: 17  Interventions: float heels, increase time out of bed, and PT/OT consult    Patient Safety:  Fall Score:  Total Score: 2  Interventions: bed/chair alarm, gripper socks, pt to call before getting OOB, and stay with me (per policy)  High Fall Risk: Yes    Length of Stay:  Expected LOS: - - -  Actual LOS: [address] [facility]

## 2022-09-20 NOTE — PERIOP NOTES
[address] from Operating Room to PACU    Report received from NIRALI Mccullough RN and JACLYN Bailey CRNA regarding Giselle Locke. Surgeon(s):  Jamey Cobian MD  And Procedure(s) (LRB):  LEFT FEMORALTO ANTERIOR -TIBIAL BYPASS WITH CADAVER VEIN (Left)  confirmed   with allergies and dressings discussed. Anesthesia type, drugs, patient history, complications, estimated blood loss, vital signs, intake and output, and last pain medication, lines, reversal medications, and temperature were reviewed. 0009 TRANSFER - OUT REPORT:    Verbal report given to Danae BILLINGS(name) on Giselle Locke  being transferred to PCU(unit) for routine post - op       Report consisted of patients Situation, Background, Assessment and   Recommendations(SBAR). Information from the following report(s) SBAR, Kardex, OR Summary, Intake/Output, MAR, and Cardiac Rhythm SR/ST  was reviewed with the receiving nurse. Lines:   Peripheral IV 09/19/22 Right Antecubital (Active)   Site Assessment Clean, dry, & intact 09/19/22 2314   Phlebitis Assessment 0 09/19/22 2314   Infiltration Assessment 0 09/19/22 2314   Dressing Status Clean, dry, & intact 09/19/22 2314   Dressing Type Transparent;Tape 09/19/22 2314   Hub Color/Line Status Green;Capped 09/19/22 2314       Peripheral IV 09/19/22 Right Hand (Active)   Site Assessment Clean, dry, & intact 09/19/22 2314   Phlebitis Assessment 0 09/19/22 2314   Infiltration Assessment 0 09/19/22 2314   Dressing Status Clean, dry, & intact 09/19/22 2314   Dressing Type Transparent;Tape 09/19/22 2314   Hub Color/Line Status Pink; Infusing 09/19/22 2314       Arterial Line 09/19/22 Right Radial artery (Active)   Site Assessment Clean, dry, & intact 09/19/22 2314   Dressing Status Clean, dry, & intact 09/19/22 2314   Dressing Type Transparent 09/19/22 2314   Line Status Intact and in place 09/19/22 2314   Treatment Zeroed or re-zeroed 09/19/22 2314   Affected Extremity/Extremities Color distal to insertion site pink (or appropriate for race); Pulses palpable 09/19/22 1938        Opportunity for questions and clarification was provided.       Patient transported with:   Monitor  Registered Nurse

## 2022-09-20 NOTE — OP NOTES
[address]  OPERATIVE REPORT    Name:  Ra Lemus  MR#:  [id number]  :  1975  ACCOUNT #:  [de-identified]  DATE OF SERVICE:  2022    PREOPERATIVE DIAGNOSIS:  Critical limb ischemia of the left lower extremity. POSTOPERATIVE DIAGNOSIS:  Critical limb ischemia of the left lower extremity. PROCEDURE PERFORMED:  Left common femoral to anterior tibial artery bypass with cadaver vein. SURGEON:  Guadalupe Flower MD    ASSISTANT:  None. ANESTHESIA:  General.    COMPLICATIONS:  None. SPECIMENS REMOVED:  None. IMPLANTS:  Cadaver vein. ESTIMATED BLOOD LOSS:  200 mL. DRAINS:  None. INDICATIONS:  The patient is a 62-year-old male with a very complicated medical history including end-stage renal disease, severe peripheral arterial disease and heparin-induced thrombocytopenia. He has an 6year-old above-knee popliteal to proximal anterior tibial artery bypass, which has failed. He requires a redo bypass. He has intractable severe ischemic rest pain of the foot with impaired sensation and motor function. His only remaining tibial vessel is the mid to distal anterior tibial artery, which is densely calcified. The very distal anterior tibial artery is occluded as is the dorsalis pedis artery and there is only collateral outflow to the foot. The patient was offered a choice between a primary below-the-knee amputation and essentially a \"[facility]\" attempt at limb salvage. He cannot be anticoagulated due to a recent history of massive GI bleeds. He understands the predicted patency of the bypass given his disadvantaged outflow and inability to tolerate the anticoagulation is poor. He opted for all aggressive attempts at limb salvage. PROCEDURE:  After informed consent was obtained, the patient was given preoperative intravenous antibiotics within 1 hour of the incision.   He was taken to the operating room and after establishing adequate general anesthesia, the left leg was prepped and draped as a sterile field. A transverse incision was made in the left groin and the left common femoral artery was dissected free. This was a good vessel with a good pulse. Through an incision on the anterolateral left lower leg just below the midpoint, the anterior tibial artery was dissected free distal to the old bypass. The artery was extremely calcified. Cadaver vein was prepared in the usual fashion. The patient was anticoagulated with bivalirudin. He was given a bolus and a constant infusion was started. An end-to-side anastomosis was created between the common femoral artery and the reversed saphenous vein graft using running 5-0 Prolene suture. Upon completion, there was a good pulse in the vein graft and the vein graft was hemostatic. The vein graft was marked on its anterior surface to assist with tunneling. The vein graft was tunneled in a subcutaneous plane beyond the anterolateral thigh and across the lateral aspect of the knee and down to the anterior tibial artery exposure. A longitudinal arteriotomy was made in the anterior tibial artery and backbleeding was mostly controlled with a 2.5 mm Josue-Rester. An end-to-side anastomosis was then created between the vein graft and the artery. There was visible clot forming within the vein graft and the patient was bolused with Angiomax two additional times. There was loss of pulse in the vein graft despite additional boluses and the continuous infusion of the bivalirudin. Some clot was manually extracted from the vein graft. Vick catheter could not be passed retrograde, because this was a reversed vein graft and the valves would not allow the catheter to pass. A small venotomy was made in the vein about 2 cm distal to the proximal anastomosis in the groin and a Vick catheter was passed distally and used to push clot and the vein was flushed with heparinized saline.   A large amount of fresh clot was extruded from the bypass. The Vick catheter was passed proximally and there was good inflow. The distal anastomosis was mostly completed and the Josue-Rester was removed. A #2 Vick catheter was passed distally down the anterior tibial and no clot was retrieved. The vein graft was again flushed from above with saline. Vick catheter was passed one more time proximally and distally through the vein graft and then the distal anastomosis was completed and the small venotomy in the groin was closed with 6-0 Prolene suture. Flow was released to the lower leg and foot. There was excellent Doppler flow in the vein graft and in the native anterior tibial artery distal to the bypass, which completely disappeared with compression of the bypass. Both wounds were hemostatic. Both wounds were irrigated with antibiotic irrigation and were closed with multiple layers of Vicryl suture and skin staples. Dry dressings were applied and the patient was transferred to the PACU in stable condition. All counts were correct.         Alfred Stephens MD      [id number]  D:  09/19/2022 23:12  T:  09/20/2022 1:28  JOB #:  [id number]

## 2022-09-20 NOTE — BRIEF OP NOTE
Brief Postoperative Note    Patient: Daniel Echols  YOB: 1975  MRN: [id number]    Date of Procedure: 9/19/2022     Pre-Op Diagnosis: PVD    Post-Op Diagnosis: Same as preoperative diagnosis. Procedure(s):  LEFT FEMORALTO ANTERIOR -TIBIAL BYPASS WITH CADAVER VEIN    Surgeon(s):  Woody Escalona MD    Surgical Assistant: Surg Asst-1: Cary Marrufo    Anesthesia: General     Estimated Blood Loss (mL): 190     Complications: None    Specimens: * No specimens in log *     Implants:   Implant Name Type Inv. Item Serial No.  Lot No. LRB No. Used Action   VEIN SAPHENOUS 81. [id number] OSVALDO --  - [id number]  VEIN SAPHENOUS 81. [id number] OSVALDO --  [id number] [facility] TISSUE BANK NA Left 1 Implanted       Drains:   [REMOVED] Orogastric Tube 12/06/21 (Removed)       [REMOVED] Orogastric Tube 12/08/21 (Removed)       Findings: CFA to distal AT bypass w/ cadaver vein. AT very calcified. Angiomax used for anticoagulation but he kept making clot so required thrombectomy. Good flow on completion.     Electronically Signed by Fredrick Jung MD on 9/19/2022 at 11:02 PM

## 2022-09-20 NOTE — PROCEDURES
Hemodialysis / [phone]    Vitals Pre Post Assessment Pre Post   BP BP: (!) 96/53 (09/20/22 1645)   112/55 LOC A/O A/O   HR Pulse (Heart Rate): 74 (09/20/22 1645) 78 Lungs diminished diminished   Resp Resp Rate: 16 (09/20/22 1645) 10 Cardiac Regular rate Regular rate   Temp Temp: 98 °F (36.7 °C) (09/20/22 1645) 98 Skin cdi cdi   Weight  na na Edema None noted None noted   Tele status bedside bedside Pain Pain Intensity 1: 0 (09/20/22 1604) treated     Orders   Duration: Start: 2380 End: 2015 Total: 3.5   Dialyzer: Dialyzer/Set Up Inspection: Revaclear (09/20/22 1645)   K Bath: Dialysate K (mEq/L): 2 (09/20/22 1645)   Ca Bath: Dialysate CA (mEq/L): 2.5 (09/20/22 1645)   Na: Dialysate NA (mEq/L): 138 (09/20/22 1645)   Bicarb: Dialysate HCO3 (mEq/L): 40 (09/20/22 1645)   Target Fluid Removal: Goal/Amount of Fluid to Remove (mL): 2500 mL (09/20/22 1645)     Access   Type & Location: SUZANNE AVG: skin CDI. No s/s of infection. + B/T. AVG cleaned and needles aspirated and flushed per p&p. No issues with cannulation or hemostasis.     Comments:                                        Labs   HBsAg (Antigen) / date:1/722 negative   HBsAb (Antibody) / date:1/7/22 immune   Source: cc   Obtained/Reviewed  Critical Results Called HGB   Date Value Ref Range Status   09/20/2022 8.7 (L) 12.1 - 17.0 g/dL Final     Potassium   Date Value Ref Range Status   09/19/2022 3.8 3.5 - 5.1 mmol/L Final     Calcium   Date Value Ref Range Status   09/19/2022 7.8 (L) 8.5 - 10.1 MG/DL Final     BUN   Date Value Ref Range Status   09/19/2022 54 (H) 6 - 20 MG/DL Final     Creatinine   Date Value Ref Range Status   09/19/2022 11.40 (H) 0.70 - 1.30 MG/DL Final     Comment:     INVESTIGATED PER DELTA CHECK PROTOCOL        Meds Given   Name Dose Route   albumin 25g                Adequacy / Fluid    Total Liters Process: 74.1   Net Fluid Removed: 1300mL      Comments   Time Out Done:   (Time) 1642   Admitting Diagnosis: : Critical lower limb ischemia [facility])   Principal Problem: None      Consent obtained/signed: Informed Consent Verified: Yes (09/20/22 1818)   Machine / RO # Machine Number: [id number] (09/20/22 3139)   Primary Nurse Rpt Pre: SB Fernandes RN   Primary Nurse Rpt Post: SB Fernandes RN   Pt Education: Procedural   Care Plan: HD POC   Pts outpatient clinic: Home HD     Tx Summary  DBP low during treatment, no symptoms and SBP > 90 with minimal HR compensation while UF on  BFR adjusted down after 4 beat vtach. UF adjusted to maintain adequate b/p   Comments:                       4424: Treatment initiated  2015: Tx ended. VSS. All possible blood returned to patient. Hemostasis achieved without issue. Bed locked and in the lowest position, call bell and belongings in reach. SBAR given to Primary, RN. Patient is stable at time of my departure. All Dialysis related medications have been reviewed.

## 2022-09-20 NOTE — PROGRESS NOTES
Problem: Falls - Risk of  Goal: *Absence of Falls  Description: Document [place] Flow Fall Risk and appropriate interventions in the flowsheet.   Outcome: Progressing Towards Goal  Note: Fall Risk Interventions:  Mobility Interventions: Assess mobility with egress test, Communicate number of staff needed for ambulation/transfer, Patient to call before getting OOB         Medication Interventions: Bed/chair exit alarm, Assess postural VS orthostatic hypotension, Patient to call before getting OOB, Teach patient to arise slowly         History of Falls Interventions: Bed/chair exit alarm, Consult care management for discharge planning

## 2022-09-20 NOTE — PROGRESS NOTES
Problem: Mobility Impaired (Adult and Pediatric)  Goal: *Acute Goals and Plan of Care (Insert Text)  Description: FUNCTIONAL STATUS PRIOR TO ADMISSION: Patient was independent use of DME.    HOME SUPPORT PRIOR TO ADMISSION: The patient lived with sister and required assistance for home dialysis. Physical Therapy Goals  Initiated 9/20/2022  1. Patient will move from supine to sit and sit to supine , scoot up and down, and roll side to side in bed with independence within 7 day(s). 2.  Patient will transfer from bed to chair and chair to bed with modified independence using the least restrictive device within 7 day(s). 3.  Patient will perform sit to stand with modified independence within 7 day(s). 4.  Patient will ambulate with modified independence for 100 feet with the least restrictive device within 7 day(s). 5.  Patient will ascend/descend 24 stairs with 1 handrail(s) with supervision/set-up within 7 day(s). Outcome: Not Met   PHYSICAL THERAPY EVALUATION  Patient: Chucho Kay (2 y.o. male)  Date: 9/20/2022  Primary Diagnosis: Critical lower limb ischemia (HCC) [I70.229]  Procedure(s) (LRB):  LEFT FEMORALTO ANTERIOR -TIBIAL BYPASS WITH CADAVER VEIN (Left) 1 Day Post-Op   Precautions: WBAT LLE       ASSESSMENT  Based on the objective data described below, the patient presents with generalized weakness, impaired functional mobility and balance and increased c/o pain in L LE which limit functional independence following s/p  L femoral to anterior-tibial bypass. Patient requesting pain medication upon arrival but agreeable to work with therapy. He was able to transition to sitting EOB with CGA and additional time. Patient required CGA/min A of 2 for transfers and to take a few steps bed to chair using RW for support- gait is slow and guarded with decreased weight bearing noted through LLE. Patient remained in chair at end of session with legs elevated. Patient reports independence at baseline.  He lives with his sister in a 2nd story apartment with 24 steps to enter. Patient is currently below his functional baseline and pending further progress with therapy and pain control, recommend HHPT with 24 hour supervision initially vs rehab. Current Level of Function Impacting Discharge (mobility/balance): CGA  to CGA/min A    Functional Outcome Measure: The patient scored 50/100 on the Barthel Index outcome measure which is indicative of partial dependence. Patient will benefit from skilled therapy intervention to address the above noted impairments. PLAN :  Recommendations and Planned Interventions: bed mobility training, transfer training, gait training, therapeutic exercises, patient and family training/education, and therapeutic activities      Frequency/Duration: Patient will be followed by physical therapy:  5 times a week to address goals. Recommendation for discharge: (in order for the patient to meet his/her long term goals)  To be determined: HHPT with 24 hour supervision vs rehab pending further progress and pain control    This discharge recommendation:  Has not yet been discussed the attending provider and/or case management    IF patient discharges home will need the following DME: patient owns DME required for discharge         SUBJECTIVE:   Patient stated I did better moving this time compared to this morning.     OBJECTIVE DATA SUMMARY:   HISTORY:    Past Medical History:   Diagnosis Date    Abdominal hematoma     AICD (automatic cardioverter/defibrillator) present     CAD (coronary artery disease)     anterior MI s/p 2 stents  2007    Chronic abdominal pain     Chronic kidney disease     ESRD; Dialysis dependent.  Live [address] home x5 days week M-F does labs    DVT (deep venous thrombosis) ([address] 2001    DVT of popliteal vein ([address] 11/2011    not anticoagulated due to bleeding, IVC filter    Endocarditis     Gallstone pancreatitis     Gastrointestinal disorder acid reflux    Gastrointestinal disorder     peptic ulcer    Hemodialysis patient ([facility]     High cholesterol     HIT (heparin-induced thrombocytopenia) ([facility]     Hypertension     Kidney transplant     b/l kidneys 1995, 2001    Long term current use of anticoagulant therapy     Nephrotic syndrome     Other ill-defined conditions(799.89)     kidny transplant x2,  on dialysis    Other ill-defined conditions(799.89)     home dialysis    Other ill-defined conditions(799.89)     hx recurrent left leg DVT    Peritonitis ([facility]     Seizures ([facility] 2015    most recent- only had three in lifetime    Small bowel obstruction (HCC)     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    V-tach ([facility]      Past Surgical History:   Procedure Laterality Date    HX APPENDECTOMY      HX CHOLECYSTECTOMY      HX OTHER SURGICAL  11/2011    exploratory laparotomy    HX OTHER SURGICAL      fem-pop    HX OTHER SURGICAL  02/2013    right upper extremity fistula    HX PACEMAKER Left 6/2009    AICD-st latonya-not connected    HX PACEMAKER Left     AICD-left side-BS-working    HX SMALL BOWEL RESECTION      HX TRANSPLANT  2001     Kidney    HX TRANSPLANT  1992    kidney    HX VASCULAR ACCESS Right     femoral access for HD- does 5 day dialysis @ home/left upper arm graft active, right upper arm occluded    IR INSERT NON TUNL CVC OVER 5 YRS  12/7/2021    IR INSERT NON TUNL CVC OVER 5 YRS  12/10/2021    IR INSERT TIPS HEPATIC SHUNT  12/30/2021    IR REMOVE TUNL CVAD W/O PORT / PUMP  10/29/2020    IR REPLACE CVC TUNNELED W/O PORT  9/10/2020    KS CARDIAC SURG PROCEDURE UNLIST  2007    2 stents    KS EDG US EXAM SURGICAL ALTER STOM DUODENUM/JEJUNUM  7/15/2011         KS ESOPHAGOGASTRODUODENOSCOPY TRANSORAL DIAGNOSTIC  5/24/2012         UPPER GI ENDOSCOPY,CTRL BLEED  12/6/2021         UPPER GI ENDOSCOPY,DIAGNOSIS  12/8/2021         UPPER GI ENDOSCOPY,DIAGNOSIS  4/20/2022         VASCULAR SURGERY PROCEDURE UNLIST  11/14/2017    Insertion AV graft right upper arm (bovine); ligation of AV fistula right arm       Home Situation  Home Environment: Private residence  # Steps to Enter: 24  Rails to Enter: Yes  Hand Rails : Right  One/Two Story Residence: One story  Living Alone: No (lives with sister)  Support Systems: Other Family Member(s) (Lives with sister: Elisha Duong)  Patient Expects to be Discharged to[de-identified] Home (May benefit from home health pending recommendation from therapy)  Current DME Used/Available at Home: Lupe Alonzo, rollator, Shower chair  Tub or Shower Type: Tub/Shower combination    EXAMINATION/PRESENTATION/DECISION MAKING:   Critical Behavior:  Neurologic State: Alert  Orientation Level: Oriented X4  Cognition: Appropriate decision making, Appropriate for age attention/concentration, Appropriate safety awareness, Follows commands     Hearing: Auditory  Auditory Impairment: None    Range Of Motion:  AROM: Generally decreased, functional           PROM: Within functional limits           Strength:    Strength: Generally decreased, functional                    Tone & Sensation:   Tone: Normal                              Coordination:  Coordination: Within functional limits  Vision:   Acuity: Within Defined Limits  Functional Mobility:  Bed Mobility:     Supine to Sit: Contact guard assistance; Additional time     Scooting: Stand-by assistance; Additional time  Transfers:  Sit to Stand: Minimum assistance;Contact guard assistance;Assist x2  Stand to Sit: Contact guard assistance        Bed to Chair: Contact guard assistance;Minimum assistance;Assist x2              Balance:   Sitting: Intact  Standing: Impaired; With support  Standing - Static: Fair  Standing - Dynamic : Fair  Ambulation/Gait Training:  Distance (ft):  (few steps bed to chair)  Assistive Device: Walker, rolling;Gait belt  Ambulation - Level of Assistance: Contact guard assistance;Minimal assistance;Assist x2        Gait Abnormalities: Decreased step clearance; Antalgic (decreased weigth bearing through LLE)     Left Side Weight Bearing: As tolerated  Base of Support: Shift to right     Speed/Bernarda: Pace decreased (<100 feet/min); Slow  Step Length: Left shortened;Right shortened         Gait is slow and guarded with decreased weight bearing through L LE noted;no LOB noted         Functional Measure:  Barthel Index:    Bathin  Bladder: 10  Bowels: 10  Groomin  Dressin  Feeding: 10  Mobility: 0  Stairs: 0  Toilet Use: 5  Transfer (Bed to Chair and Back): 5  Total: 50/100       The Barthel ADL Index: Guidelines  1. The index should be used as a record of what a patient does, not as a record of what a patient could do. 2. The main aim is to establish degree of independence from any help, physical or verbal, however minor and for whatever reason. 3. The need for supervision renders the patient not independent. 4. A patient's performance should be established using the best available evidence. Asking the patient, friends/relatives and nurses are the usual sources, but direct observation and common sense are also important. However direct testing is not needed. 5. Usually the patient's performance over the preceding 24-48 hours is important, but occasionally longer periods will be relevant. 6. Middle categories imply that the patient supplies over 50 per cent of the effort. 7. Use of aids to be independent is allowed. Score Interpretation (from [address] [address])    Independent   60-79 Minimally independent   40-59 Partially dependent   20-39 Very dependent   <20 Totally dependent     -Edyta Harden., Barthel, D.W. (1965). Functional evaluation: the Barthel Index. [facility] ([address] (1997). The Barthel activities of daily living index: self-reporting versus actual performance in the old (> or = 75 years). Journal of [address] [place] [address] 45(7), [facility], J.J.BronwynF, Anisha Rob., Akbar Isaacs. ().  Measuring the change in disability after inpatient rehabilitation; comparison of the responsiveness of the Barthel Index and Functional [place] Measure. Journal of Neurology, Neurosurgery, and Psychiatry, 66(4), 809-722. ELEAZAR Crystal, GAMAL Payton, & Carrie Lopez M.A. (2004) Assessment of post-stroke quality of life in cost-effectiveness studies: The usefulness of the Barthel Index and the EuroQoL-5D. Quality of Life Research, 13, 427-43             Pain Rating:  Patient reporting 8/10 pain in L LE- nursing notified and medicated patient    Activity Tolerance:   Poor and limited by pain    After treatment patient left in no apparent distress:   Sitting in chair, Call bell within reach, and Bed / chair alarm activated    COMMUNICATION/EDUCATION:   The patients plan of care was discussed with: Occupational therapist and Registered nurse. Fall prevention education was provided and the patient/caregiver indicated understanding., Patient/family have participated as able in goal setting and plan of care. , and Patient/family agree to work toward stated goals and plan of care.     Thank you for this referral.  Bethanie Salinas, PT   Time Calculation: 22 mins

## 2022-09-21 LAB
ERYTHROCYTE [DISTWIDTH] IN BLOOD BY AUTOMATED COUNT: 15.2 % (ref 11.5–14.5)
HCT VFR BLD AUTO: 25 % (ref 36.6–50.3)
HGB BLD-MCNC: 8.2 G/DL (ref 12.1–17)
MCH RBC QN AUTO: 30.4 PG (ref 26–34)
MCHC RBC AUTO-ENTMCNC: 32.8 G/DL (ref 30–36.5)
MCV RBC AUTO: 92.6 FL (ref 80–99)
NRBC # BLD: 0 K/UL (ref 0–0.01)
NRBC BLD-RTO: 0 PER 100 WBC
PLATELET # BLD AUTO: 148 K/UL (ref 150–400)
PMV BLD AUTO: 10.5 FL (ref 8.9–12.9)
RBC # BLD AUTO: 2.7 M/UL (ref 4.1–5.7)
WBC # BLD AUTO: 7 K/UL (ref 4.1–11.1)

## 2022-09-21 PROCEDURE — 36415 COLL VENOUS BLD VENIPUNCTURE: CPT

## 2022-09-21 PROCEDURE — 74011250636 HC RX REV CODE- 250/636: Performed by: NURSE PRACTITIONER

## 2022-09-21 PROCEDURE — 74011250637 HC RX REV CODE- 250/637: Performed by: NURSE PRACTITIONER

## 2022-09-21 PROCEDURE — 74011250636 HC RX REV CODE- 250/636: Performed by: SURGERY

## 2022-09-21 PROCEDURE — 65270000046 HC RM TELEMETRY

## 2022-09-21 PROCEDURE — 74011250637 HC RX REV CODE- 250/637: Performed by: SURGERY

## 2022-09-21 PROCEDURE — 97116 GAIT TRAINING THERAPY: CPT

## 2022-09-21 PROCEDURE — 85027 COMPLETE CBC AUTOMATED: CPT

## 2022-09-21 RX ORDER — HYDROMORPHONE HYDROCHLORIDE 2 MG/ML
2 INJECTION, SOLUTION INTRAMUSCULAR; INTRAVENOUS; SUBCUTANEOUS
Status: DISCONTINUED | OUTPATIENT
Start: 2022-09-21 | End: 2022-09-23

## 2022-09-21 RX ADMIN — Medication 1 AMPULE: at 20:00

## 2022-09-21 RX ADMIN — METOPROLOL SUCCINATE 12.5 MG: 25 TABLET, EXTENDED RELEASE ORAL at 08:11

## 2022-09-21 RX ADMIN — HYDROMORPHONE HYDROCHLORIDE 2 MG: 2 INJECTION, SOLUTION INTRAMUSCULAR; INTRAVENOUS; SUBCUTANEOUS at 17:20

## 2022-09-21 RX ADMIN — PANTOPRAZOLE SODIUM 40 MG: 40 TABLET, DELAYED RELEASE ORAL at 08:12

## 2022-09-21 RX ADMIN — CALCIUM ACETATE 1334 MG: 667 CAPSULE ORAL at 11:01

## 2022-09-21 RX ADMIN — OXYCODONE AND ACETAMINOPHEN 2 TABLET: 5; 325 TABLET ORAL at 12:12

## 2022-09-21 RX ADMIN — Medication 1 AMPULE: at 08:10

## 2022-09-21 RX ADMIN — PANTOPRAZOLE SODIUM 40 MG: 40 TABLET, DELAYED RELEASE ORAL at 16:09

## 2022-09-21 RX ADMIN — CALCIUM ACETATE 1334 MG: 667 CAPSULE ORAL at 16:09

## 2022-09-21 RX ADMIN — HYDROMORPHONE HYDROCHLORIDE 1 MG: 1 INJECTION, SOLUTION INTRAMUSCULAR; INTRAVENOUS; SUBCUTANEOUS at 06:48

## 2022-09-21 RX ADMIN — ASPIRIN 81 MG: 81 TABLET, COATED ORAL at 08:12

## 2022-09-21 RX ADMIN — CALCITRIOL CAPSULES 0.25 MCG 0.5 MCG: 0.25 CAPSULE ORAL at 08:10

## 2022-09-21 RX ADMIN — SEVELAMER CARBONATE 1600 MG: 800 TABLET, FILM COATED ORAL at 08:11

## 2022-09-21 RX ADMIN — HYDROMORPHONE HYDROCHLORIDE 2 MG: 2 INJECTION, SOLUTION INTRAMUSCULAR; INTRAVENOUS; SUBCUTANEOUS at 13:46

## 2022-09-21 RX ADMIN — HYDROMORPHONE HYDROCHLORIDE 2 MG: 2 INJECTION, SOLUTION INTRAMUSCULAR; INTRAVENOUS; SUBCUTANEOUS at 20:15

## 2022-09-21 RX ADMIN — HYDROMORPHONE HYDROCHLORIDE 1 MG: 1 INJECTION, SOLUTION INTRAMUSCULAR; INTRAVENOUS; SUBCUTANEOUS at 00:09

## 2022-09-21 RX ADMIN — SEVELAMER CARBONATE 1600 MG: 800 TABLET, FILM COATED ORAL at 16:09

## 2022-09-21 RX ADMIN — CALCIUM ACETATE 1334 MG: 667 CAPSULE ORAL at 08:11

## 2022-09-21 RX ADMIN — ATORVASTATIN CALCIUM 20 MG: 20 TABLET, FILM COATED ORAL at 08:11

## 2022-09-21 RX ADMIN — HYDROMORPHONE HYDROCHLORIDE 2 MG: 2 INJECTION, SOLUTION INTRAMUSCULAR; INTRAVENOUS; SUBCUTANEOUS at 09:14

## 2022-09-21 RX ADMIN — SEVELAMER CARBONATE 1600 MG: 800 TABLET, FILM COATED ORAL at 20:00

## 2022-09-21 RX ADMIN — OXYCODONE AND ACETAMINOPHEN 2 TABLET: 5; 325 TABLET ORAL at 16:09

## 2022-09-21 RX ADMIN — HYDROMORPHONE HYDROCHLORIDE 2 MG: 2 INJECTION, SOLUTION INTRAMUSCULAR; INTRAVENOUS; SUBCUTANEOUS at 23:27

## 2022-09-21 NOTE — PROGRESS NOTES
Problem: Falls - Risk of  Goal: *Absence of Falls  Description: Document Starleen Freeze Fall Risk and appropriate interventions in the flowsheet.   Outcome: Progressing Towards Goal  Note: Fall Risk Interventions:  Mobility Interventions: Bed/chair exit alarm, Communicate number of staff needed for ambulation/transfer         Medication Interventions: Bed/chair exit alarm, Evaluate medications/consider consulting pharmacy, Patient to call before getting OOB, Teach patient to arise slowly         History of Falls Interventions: Bed/chair exit alarm, Consult care management for discharge planning

## 2022-09-21 NOTE — PROGRESS NOTES
0700: Bedside shift change report given to Calli Persaud, RN and Hai Agarwal, RN (oncoming nurse) by Liliane Cisneros RN (offgoing nurse). Report included the following information SBAR, Kardex, Intake/Output, MAR, and Recent Results. 0700: Olya Regalado will be documenting on patient. 1020: Dr. Martha Polk at bedside assessing patient, will plan for dialysis tomorrow. 1115: Pilar Craig NP at bedside assessing patient. Will continue current treatment. Dilaudid dose increased for pain control. Encourage ambulation and IS. Will keep wound with staples CHRISTIN.    1900: End of Shift Note    Bedside shift change report given to oncladarius RN (oncoming nurse) by Roya Gutierrez (offgoing nurse). Report included the following information SBAR, Kardex, Intake/Output, MAR, and Recent Results    Shift worked:  [id number]     Shift summary and any significant changes:     OOB in recliner in chair most of day, still requiring pain meds. Concerns for physician to address:  none     Zone phone for oncoming shift:   XXX       Activity:  Activity Level: Up with Assistance  Number times ambulated in hallways past shift: 0  Number of times OOB to chair past shift: 3    Cardiac:   Cardiac Monitoring: Yes      Cardiac Rhythm: Sinus Rhythm    Access:  Current line(s): PIV     Genitourinary:   Urinary status: anuric    Respiratory:   O2 Device: None (Room air)  Chronic home O2 use?: NO  Incentive spirometer at bedside: YES       GI:  Last Bowel Movement Date: 09/17/22  Current diet:  ADULT DIET Regular; Low Potassium (Less than 3000 mg/day); Low Phosphorus (Less than 1000 mg)  Passing flatus: YES  Tolerating current diet: YES       Pain Management:   Patient states pain is manageable on current regimen: YES    Skin:  Yong Score: 18  Interventions: float heels, increase time out of bed, and PT/OT consult    Patient Safety:  Fall Score:  Total Score: 2  Interventions: bed/chair alarm, gripper socks, pt to call before getting OOB, and stay with me (per policy)  High Fall Risk: Yes    Length of Stay:  Expected LOS: 5d 4h  Actual LOS: [address]

## 2022-09-21 NOTE — PROGRESS NOTES
Problem: Falls - Risk of  Goal: *Absence of Falls  Description: Document Santos Clara Fall Risk and appropriate interventions in the flowsheet. Outcome: Progressing Towards Goal  Note: Fall Risk Interventions:  Mobility Interventions: Bed/chair exit alarm, Communicate number of staff needed for ambulation/transfer         Medication Interventions: Bed/chair exit alarm, Evaluate medications/consider consulting pharmacy, Patient to call before getting OOB, Teach patient to arise slowly         History of Falls Interventions: Bed/chair exit alarm, Consult care management for discharge planning         Problem: Pressure Injury - Risk of  Goal: *Prevention of pressure injury  Description: Document Yong Scale and appropriate interventions in the flowsheet.   Outcome: Progressing Towards Goal  Note: Pressure Injury Interventions:  Sensory Interventions: Assess need for specialty bed, Avoid rigorous massage over bony prominences, Chair cushion, Discuss PT/OT consult with provider, Keep linens dry and wrinkle-free, Maintain/enhance activity level, Minimize linen layers    Moisture Interventions: Absorbent underpads, Apply protective barrier, creams and emollients, Maintain skin hydration (lotion/cream), Minimize layers, Moisture barrier    Activity Interventions: Assess need for specialty bed, Chair cushion, Increase time out of bed, Pressure redistribution bed/mattress(bed type)    Mobility Interventions: HOB 30 degrees or less, PT/OT evaluation    Nutrition Interventions: Document food/fluid/supplement intake, Discuss nutritional consult with provider    Friction and Shear Interventions: Feet elevated on foot rest, Foam dressings/transparent film/skin sealants, Lift sheet, Lift team/patient mobility team, Minimize layers

## 2022-09-21 NOTE — PROGRESS NOTES
Transition of Care Plan:    RUR: 22%    Disposition:Rehab    Follow up appointments: To be done by rehab facility when discharged    DME needed:None    Transportation at [address] or means to access home:  Sister has keys      IM Medicare Letter: To be given prior to discharge    Is patient a  and connected with the [place]? No              If yes, was [employer] transfer form completed and VA notified? N/A    Caregiver Contact:Sister    Discharge Caregiver contacted prior to discharge? Caregiver to be contacted prior to discharge. Care Conference needed?:    No        Spoke with patient re dcp. He would like to go to [facility] prior to going home. Referral sent to [facility] and will await their response. Sybil Espino RN BSN CRM        [phone]

## 2022-09-21 NOTE — PROGRESS NOTES
Vascular Surgery Progress Note  Alex Waite [facility]  9/21/2022       Subjective:     Mr. Chelo Dacosta is a 42 y.o. AAM with a pmhx significant for ESRF on home HD, CAD, HTN, HLD, systolic CHF s/p ICD placement, multiple DVTs currently w/ IVC filter (1/4/22), HIT, small bowel necrosis, GERD, and multiple gastric ulcers s/p TIPS (12/30/2022). He has not been on anticoagulation since his TIPS procedure. He is well known to the practice for the management of PAD and HD access. He currently has a LUE [place] aVG (9/2020). He has chronic central venous syndrome requiring multiple fistulograms (last 9/2/22). He has a remote hx of LLE bypass graft that recently occluded. He is admitted to the hospital s/p a left common femoral to anterior tibial artery bypass with cadaver vein (9/19/22). This am his foot remains warm and perfused. He complaint of pain with activity. He is slow to mobilize.      Nursing Data:     Patient Vitals for the past 24 hrs:   BP Temp Pulse Resp SpO2 Weight   09/21/22 1103 (!) 134/35 97.4 °F (36.3 °C) 92 12 97 % --   09/21/22 1036 (!) 123/45 97.4 °F (36.3 °C) 92 14 98 % --   09/21/22 0811 (!) 105/37 -- 84 -- -- --   09/21/22 0731 (!) 98/48 98 °F (36.7 °C) 78 12 98 % --   09/21/22 0630 -- -- -- -- -- 61.7 kg (136 lb 1.6 oz)   09/21/22 0330 (!) 93/58 98.1 °F (36.7 °C) 82 11 95 % --   09/21/22 0006 (!) 104/31 97.9 °F (36.6 °C) 92 19 99 % --   09/20/22 2015 (!) 112/55 98 °F (36.7 °C) 78 10 -- --   09/20/22 2000 (!) 117/36 -- 73 10 -- --   09/20/22 1945 (!) 118/34 -- 70 13 -- --   09/20/22 1930 (!) 116/41 98 °F (36.7 °C) 76 12 100 % --   09/20/22 1915 (!) 88/56 -- 74 13 -- --   09/20/22 1900 (!) 90/51 -- 71 10 -- --   09/20/22 1845 104/63 -- 77 12 -- --   09/20/22 1830 95/64 -- 72 12 -- --   09/20/22 1815 (!) 108/41 -- 85 16 -- --   09/20/22 1800 97/61 -- 74 16 -- --   09/20/22 1748 (!) 101/32 -- 72 -- -- --   09/20/22 1745 (!) 80/29 -- 71 10 -- --   09/20/22 1730 (!) 99/25 -- 75 13 100 % --   09/20/22 1715 (!) 112/20 -- 74 10 -- --   09/20/22 1700 100/69 -- 80 10 100 % --   09/20/22 1645 (!) 96/53 98 °F (36.7 °C) 74 16 100 % --   09/20/22 1509 (!) 115/42 97.3 °F (36.3 °C) 78 16 100 % --   09/20/22 1422 103/88 -- 84 -- 98 % --           Intake/Output Summary (Last 24 hours) at 9/21/2022 1112  Last data filed at 9/20/2022 2015  Gross per 24 hour   Intake 240 ml   Output 1300 ml   Net -1060 ml         Exam:     Physical Exam  Constitutional:       Comments: Chronically ill appearing. Appears older than stated age. HENT:      Mouth/Throat:      Mouth: Mucous membranes are moist.   Eyes:      Pupils: Pupils are equal, round, and reactive to light. Cardiovascular:      Rate and Rhythm: Normal rate and regular rhythm. Comments: Left foot is warm and perfused  Pulmonary:      Effort: Pulmonary effort is normal. No respiratory distress. Abdominal:      General: There is no distension. Palpations: Abdomen is soft. Musculoskeletal:         General: Normal range of motion. Skin:     Coloration: Skin is pale. Comments: Surgical incisions is clean dry and intact. Neurological:      Mental Status: He is alert. Mental status is at baseline. Lab Review:     . Recent Results (from the past 24 hour(s))   CBC WITH AUTOMATED DIFF    Collection Time: 09/20/22  4:06 PM   Result Value Ref Range    WBC 9.0 4.1 - 11.1 K/uL    RBC 2.88 (L) 4.10 - 5.70 M/uL    HGB 8.7 (L) 12.1 - 17.0 g/dL    HCT 26.2 (L) 36.6 - 50.3 %    MCV 91.0 80.0 - 99.0 FL    MCH 30.2 26.0 - 34.0 PG    MCHC 33.2 30.0 - 36.5 g/dL    RDW 15.0 (H) 11.5 - 14.5 %    PLATELET 105 089 - 453 K/uL    MPV 10.2 8.9 - 12.9 FL    NRBC 0.0 0  WBC    ABSOLUTE NRBC 0.00 0.00 - 0.01 K/uL    NEUTROPHILS 84 (H) 32 - 75 %    LYMPHOCYTES 6 (L) 12 - 49 %    MONOCYTES 10 5 - 13 %    EOSINOPHILS 0 0 - 7 %    BASOPHILS 0 0 - 1 %    IMMATURE GRANULOCYTES 0 0.0 - 0.5 %    ABS. NEUTROPHILS 7.6 1.8 - 8.0 K/UL    ABS.  LYMPHOCYTES 0.5 (L) 0.8 - 3.5 K/UL    ABS. MONOCYTES 0.9 0.0 - 1.0 K/UL    ABS. EOSINOPHILS 0.0 0.0 - 0.4 K/UL    ABS. BASOPHILS 0.0 0.0 - 0.1 K/UL    ABS. IMM. GRANS. 0.0 0.00 - 0.04 K/UL    DF SMEAR SCANNED      RBC COMMENTS NORMOCYTIC, NORMOCHROMIC     RENAL FUNCTION PANEL    Collection Time: 09/20/22  4:06 PM   Result Value Ref Range    Sodium 130 (L) 136 - 145 mmol/L    Potassium 4.4 3.5 - 5.1 mmol/L    Chloride 94 (L) 97 - 108 mmol/L    CO2 26 21 - 32 mmol/L    Anion gap 10 5 - 15 mmol/L    Glucose 136 (H) 65 - 100 mg/dL    BUN 71 (H) 6 - 20 MG/DL    Creatinine 13.30 (H) 0.70 - 1.30 MG/DL    BUN/Creatinine ratio 5 (L) 12 - 20      GFR est AA 5 (L) >60 ml/min/1.73m2    GFR est non-AA 4 (L) >60 ml/min/1.73m2    Calcium 7.7 (L) 8.5 - 10.1 MG/DL    Phosphorus 6.2 (H) 2.6 - 4.7 MG/DL    Albumin 2.8 (L) 3.5 - 5.0 g/dL   CBC W/O DIFF    Collection Time: 09/21/22  4:03 AM   Result Value Ref Range    WBC 7.0 4.1 - 11.1 K/uL    RBC 2.70 (L) 4.10 - 5.70 M/uL    HGB 8.2 (L) 12.1 - 17.0 g/dL    HCT 25.0 (L) 36.6 - 50.3 %    MCV 92.6 80.0 - 99.0 FL    MCH 30.4 26.0 - 34.0 PG    MCHC 32.8 30.0 - 36.5 g/dL    RDW 15.2 (H) 11.5 - 14.5 %    PLATELET 260 (L) 486 - 400 K/uL    MPV 10.5 8.9 - 12.9 FL    NRBC 0.0 0  WBC    ABSOLUTE NRBC 0.00 0.00 - 0.01 K/uL          Assessment/Plan:     Left leg critical limb ischemic   - left common femoral to anterior tibial artery bypass with cadaver vein (9/19/22). -leukocytosis resolved. Mild thrombocytopenia post procedure as expected. -ASA initiated after discussion with Dr. Carmen Smith. Continue to encourage OOB/PT/OT. WBAT. Keep LLE elevated when not ambulating. Float heels at all times. Encourage I-S.    ESRF   -on home HD M-F via LUE [place] aVG  Hyponatremia  Anemia of chronic disease  -stable  Hypoalbuminemia   Appreciate input from Nephrology. HD TTS while admitted. Coronary artery disease   HFrEF w/ hx of ICD  Hypertension  Hyperlipidemia  -Euvolemic.  BP labile.   -Lipitor & metoprolol    Hx of recurrent GIB  Hx of multiple gastric ulcer  -s/p TIPS (12/30/21)  -last EGD 4/2022  Hx of small bowel necrosis   GERD  -Protonix  -Dr. Emre Larson    VTE prophylaxis:  Hx of multiple DVTs   -currently w/ IVC filter (1/4/22)  Hx of HIT  SCDs contraindicated in diffuse PAD  Encourage OOB     Disposition:   Home in 1-2 days

## 2022-09-21 NOTE — PROGRESS NOTES
Problem: Mobility Impaired (Adult and Pediatric)  Goal: *Acute Goals and Plan of Care (Insert Text)  Description: FUNCTIONAL STATUS PRIOR TO ADMISSION: Patient was independent use of DME.    HOME SUPPORT PRIOR TO ADMISSION: The patient lived with sister and required assistance for home dialysis. Physical Therapy Goals  Initiated 9/20/2022  1. Patient will move from supine to sit and sit to supine , scoot up and down, and roll side to side in bed with independence within 7 day(s). 2.  Patient will transfer from bed to chair and chair to bed with modified independence using the least restrictive device within 7 day(s). 3.  Patient will perform sit to stand with modified independence within 7 day(s). 4.  Patient will ambulate with modified independence for 100 feet with the least restrictive device within 7 day(s). 5.  Patient will ascend/descend 24 stairs with 1 handrail(s) with supervision/set-up within 7 day(s). Outcome: Progressing Towards Goal   PHYSICAL THERAPY TREATMENT  Patient: Lb Coker (8 y.o. male)  Date: 9/21/2022  Diagnosis: Critical lower limb ischemia ([facility] [address] [I70.229] <principal problem not specified>  Procedure(s) (LRB):  LEFT FEMORALTO ANTERIOR -TIBIAL BYPASS WITH CADAVER VEIN (Left) 2 Days Post-Op  Precautions:    Chart, physical therapy assessment, plan of care and goals were reviewed. ASSESSMENT  Patient continues with skilled PT services and is progressing towards goals. He is received in bedside chair. Patient is able to ambulate slightly increased distance this session. He continues to report L LE pain and discomfort 7/10 when weight bearing. Verbal cues and tactile cues are provided for RW management. Current Level of Function Impacting Discharge (mobility/balance):  CGA with RW short distance     Other factors to consider for discharge: medical stability, pain control, 20+ stairs to enter home         PLAN :  Patient continues to benefit from skilled intervention to address the above impairments. Continue treatment per established plan of care. to address goals. Recommendation for discharge: (in order for the patient to meet his/her long term goals)  Physical therapy at least 2 days/week in the home v. SNF --patient must be able to ascend 20+ stairs to return home    This discharge recommendation:  Has been made in collaboration with the attending provider and/or case management    IF patient discharges home will need the following DME: patient owns DME required for discharge       SUBJECTIVE:   Patient stated That's all I can do right now.     OBJECTIVE DATA SUMMARY:   Critical Behavior:  Neurologic State: Alert  Orientation Level: Oriented X4  Cognition: Follows commands     Functional Mobility Training:  Bed Mobility:                    Transfers:  Sit to Stand: Contact guard assistance  Stand to Sit: Contact guard assistance        Bed to Chair: Contact guard assistance                    Balance:     Ambulation/Gait Training:  Distance (ft): 12 Feet (ft)  Assistive Device: Walker, rolling;Gait belt  Ambulation - Level of Assistance: Contact guard assistance;Minimal assistance;Assist x2        Gait Abnormalities: Decreased step clearance; Antalgic        Base of Support: Shift to right     Speed/Bernarda: Pace decreased (<100 feet/min)  Step Length: Right shortened;Left shortened                    Stairs: Therapeutic Exercises:     Pain Ratin/10     Activity Tolerance:   Fair    After treatment patient left in no apparent distress:   Sitting in chair, Heels elevated for pressure relief, Call bell within reach, and Bed / chair alarm activated    COMMUNICATION/COLLABORATION:   The patients plan of care was discussed with: Registered nurse.      Nelda Pascal, PT   Time Calculation: 12 mins

## 2022-09-21 NOTE — PROGRESS NOTES
Nephrology Progress Note  [facility] / [address] , [place], [address]  Phone - [phone]  Fax - [phone]                 Patient: Pearl Hernandez                   YOB: 1975        Date- 9/21/2022                      Admit Date: 9/19/2022  CC: Follow up for esrd          IMPRESSION & PLAN:    Esrd  - home hd- f/b [facility] nephrologist  Left leg critical limb ischemic - left common femoral to anterior tibial artery bypass with cadaver vein (9/19/22). Hyponatremia  anemia of ckd  Pl. Effusion  Hypertension  Sec. Hyperpara  H/o gastric ca  H/o GI bleed    PLAN-  No hd today  Continue calcitriol  Continue epogen  Continue phoslo, renvela     Subjective: Interval History:   -  s/p hd yesterday  Bp low  No sob      Objective:   Vitals:    09/21/22 0330 09/21/22 0630 09/21/22 0731 09/21/22 0811   BP: (!) 93/58  (!) 98/48 (!) 105/37   Pulse: 82  78 84   Resp: 11  12    Temp: 98.1 °F (36.7 °C)  98 °F (36.7 °C)    SpO2: 95%  98%    Weight:  61.7 kg (136 lb 1.6 oz)     Height:          09/20 0701 - 09/21 0700  In: 480 [P.O.:480]  Out: 1300   Last 3 Recorded Weights in this Encounter    09/19/22 1346 09/21/22 0630   Weight: 57.4 kg (126 lb 8.7 oz) 61.7 kg (136 lb 1.6 oz)        Physical exam:    GEN: NAD  NECK- Supple, no mass  RESP: No wheezing, Clear b/l  CVS: S1,S2  RRR  NEURO: Normal speech, Non focal  EXT:upper arm avg +, left leg staples +  PSYCH: Normal Mood    Chart reviewed. Pertinent Notes reviewed. Data Review :  Recent Labs     09/20/22  1606 09/19/22  1527   * 132*   K 4.4 3.8   CL 94* 95*   CO2 26 21   BUN 71* 54*   CREA 13.30* 11.40*   * 65   CA 7.7* 7.8*   PHOS 6.2*  --      Recent Labs     09/21/22  0403 09/20/22  1606   WBC 7.0 9.0   HGB 8.2* 8.7*   HCT 25.0* 26.2*   * 164     No results for input(s): FE, TIBC, PSAT, FERR in the last 72 hours.    Lab Results Component Value Date/Time    Hemoglobin A1c 3.8 (L) 05/22/2012 05:00 PM      No results found for: MCACR, MCA1, MCA2, MCA3, MCAU, MCAU2, MCALPOCT  Lab Results   Component Value Date/Time    BNP 1,946 (H) 09/16/2014 04:17 AM     (H) 02/07/2012 03:00 AM    NT pro- (H) 09/06/2009 04:25 AM     US Results (most recent):  Medication list  reviewed  Current Facility-Administered Medications   Medication Dose Route Frequency    HYDROmorphone (DILAUDID) injection 2 mg  2 mg IntraVENous Q3H PRN    alcohol 62% (NOZIN) nasal  1 Ampule  1 Ampule Topical Q12H    oxyCODONE-acetaminophen (PERCOCET) 5-325 mg per tablet 1-2 Tablet  1-2 Tablet Oral Q4H PRN    epoetin emilie (EPOGEN;PROCRIT) injection 20,000 Units  20,000 Units SubCUTAneous Q TUE, THU & SAT    albumin human 25% (BUMINATE) solution 25 g  25 g IntraVENous DIALYSIS PRN    aspirin delayed-release tablet 81 mg  81 mg Oral DAILY    atorvastatin (LIPITOR) tablet 20 mg  20 mg Oral DAILY    calcitRIOL (ROCALTROL) capsule 0.5 mcg  0.5 mcg Oral DAILY    calcium acetate(phosphat bind) (PHOSLO) capsule 1,334 mg  2 Capsule Oral TID WITH MEALS    metoprolol succinate (TOPROL-XL) XL tablet 12.5 mg  12.5 mg Oral DAILY    pantoprazole (PROTONIX) tablet 40 mg  40 mg Oral ACB&D    sevelamer carbonate (RENVELA) tab 1,600 mg  1,600 mg Oral TID    bisacodyL (DULCOLAX) suppository 10 mg  10 mg Rectal DAILY PRN    ondansetron (ZOFRAN) injection 4 mg  4 mg IntraVENous Q6H PRN    acetaminophen (TYLENOL) tablet 650 mg  650 mg Oral Q4H PRN        Adela Marsh MD  9/21/2022

## 2022-09-22 LAB
ALBUMIN SERPL-MCNC: 3.1 G/DL (ref 3.5–5)
ANION GAP SERPL CALC-SCNC: 8 MMOL/L (ref 5–15)
BUN SERPL-MCNC: 43 MG/DL (ref 6–20)
BUN/CREAT SERPL: 5 (ref 12–20)
CALCIUM SERPL-MCNC: 8.5 MG/DL (ref 8.5–10.1)
CHLORIDE SERPL-SCNC: 94 MMOL/L (ref 97–108)
CO2 SERPL-SCNC: 31 MMOL/L (ref 21–32)
COMMENT, HOLDF: NORMAL
CREAT SERPL-MCNC: 8.94 MG/DL (ref 0.7–1.3)
GLUCOSE SERPL-MCNC: 84 MG/DL (ref 65–100)
PHOSPHATE SERPL-MCNC: 4.5 MG/DL (ref 2.6–4.7)
POTASSIUM SERPL-SCNC: 4 MMOL/L (ref 3.5–5.1)
SAMPLES BEING HELD,HOLD: NORMAL
SODIUM SERPL-SCNC: 133 MMOL/L (ref 136–145)

## 2022-09-22 PROCEDURE — 74011250636 HC RX REV CODE- 250/636: Performed by: INTERNAL MEDICINE

## 2022-09-22 PROCEDURE — 36415 COLL VENOUS BLD VENIPUNCTURE: CPT

## 2022-09-22 PROCEDURE — 65270000046 HC RM TELEMETRY

## 2022-09-22 PROCEDURE — 97530 THERAPEUTIC ACTIVITIES: CPT

## 2022-09-22 PROCEDURE — 97110 THERAPEUTIC EXERCISES: CPT | Performed by: PHYSICAL THERAPIST

## 2022-09-22 PROCEDURE — 74011250637 HC RX REV CODE- 250/637: Performed by: NURSE PRACTITIONER

## 2022-09-22 PROCEDURE — 74011250636 HC RX REV CODE- 250/636: Performed by: NURSE PRACTITIONER

## 2022-09-22 PROCEDURE — 80069 RENAL FUNCTION PANEL: CPT

## 2022-09-22 PROCEDURE — 97116 GAIT TRAINING THERAPY: CPT | Performed by: PHYSICAL THERAPIST

## 2022-09-22 PROCEDURE — 74011250637 HC RX REV CODE- 250/637: Performed by: SURGERY

## 2022-09-22 PROCEDURE — 90935 HEMODIALYSIS ONE EVALUATION: CPT

## 2022-09-22 RX ADMIN — Medication 1 AMPULE: at 12:22

## 2022-09-22 RX ADMIN — HYDROMORPHONE HYDROCHLORIDE 2 MG: 2 INJECTION, SOLUTION INTRAMUSCULAR; INTRAVENOUS; SUBCUTANEOUS at 02:17

## 2022-09-22 RX ADMIN — HYDROMORPHONE HYDROCHLORIDE 2 MG: 2 INJECTION, SOLUTION INTRAMUSCULAR; INTRAVENOUS; SUBCUTANEOUS at 05:39

## 2022-09-22 RX ADMIN — CALCIUM ACETATE 1334 MG: 667 CAPSULE ORAL at 12:24

## 2022-09-22 RX ADMIN — HYDROMORPHONE HYDROCHLORIDE 2 MG: 2 INJECTION, SOLUTION INTRAMUSCULAR; INTRAVENOUS; SUBCUTANEOUS at 23:35

## 2022-09-22 RX ADMIN — ATORVASTATIN CALCIUM 20 MG: 20 TABLET, FILM COATED ORAL at 12:24

## 2022-09-22 RX ADMIN — ASPIRIN 81 MG: 81 TABLET, COATED ORAL at 12:24

## 2022-09-22 RX ADMIN — SEVELAMER CARBONATE 1600 MG: 800 TABLET, FILM COATED ORAL at 12:23

## 2022-09-22 RX ADMIN — CALCIUM ACETATE 1334 MG: 667 CAPSULE ORAL at 16:56

## 2022-09-22 RX ADMIN — HYDROMORPHONE HYDROCHLORIDE 2 MG: 2 INJECTION, SOLUTION INTRAMUSCULAR; INTRAVENOUS; SUBCUTANEOUS at 20:28

## 2022-09-22 RX ADMIN — PANTOPRAZOLE SODIUM 40 MG: 40 TABLET, DELAYED RELEASE ORAL at 15:30

## 2022-09-22 RX ADMIN — ERYTHROPOIETIN 20000 UNITS: 20000 INJECTION, SOLUTION INTRAVENOUS; SUBCUTANEOUS at 22:40

## 2022-09-22 RX ADMIN — Medication 1 AMPULE: at 22:41

## 2022-09-22 RX ADMIN — CALCITRIOL CAPSULES 0.25 MCG 0.5 MCG: 0.25 CAPSULE ORAL at 12:22

## 2022-09-22 RX ADMIN — OXYCODONE AND ACETAMINOPHEN 2 TABLET: 5; 325 TABLET ORAL at 03:51

## 2022-09-22 RX ADMIN — HYDROMORPHONE HYDROCHLORIDE 2 MG: 2 INJECTION, SOLUTION INTRAMUSCULAR; INTRAVENOUS; SUBCUTANEOUS at 18:21

## 2022-09-22 RX ADMIN — OXYCODONE AND ACETAMINOPHEN 2 TABLET: 5; 325 TABLET ORAL at 22:41

## 2022-09-22 RX ADMIN — SEVELAMER CARBONATE 1600 MG: 800 TABLET, FILM COATED ORAL at 22:40

## 2022-09-22 RX ADMIN — SEVELAMER CARBONATE 1600 MG: 800 TABLET, FILM COATED ORAL at 15:25

## 2022-09-22 RX ADMIN — HYDROMORPHONE HYDROCHLORIDE 2 MG: 2 INJECTION, SOLUTION INTRAMUSCULAR; INTRAVENOUS; SUBCUTANEOUS at 15:25

## 2022-09-22 RX ADMIN — HYDROMORPHONE HYDROCHLORIDE 2 MG: 2 INJECTION, SOLUTION INTRAMUSCULAR; INTRAVENOUS; SUBCUTANEOUS at 12:36

## 2022-09-22 RX ADMIN — OXYCODONE AND ACETAMINOPHEN 2 TABLET: 5; 325 TABLET ORAL at 08:15

## 2022-09-22 NOTE — PROGRESS NOTES
1900: End of Shift Note    Bedside shift change report given to  [address] (oncoming nurse) by Teresa Tapia RN & Aleisha BILLINGS (offgoing nurse). Report included the following information SBAR, Kardex, Intake/Output, and MAR    Shift worked:  7a-7p     Shift summary and any significant changes:     Pt had dialysis today. Pain well controlled, pt moving around more. Concerns for physician to address:  N/a     Zone phone for oncoming shift:          Activity:  Activity Level: Up with Assistance  Number times ambulated in hallways past shift: 0  Number of times OOB to chair past shift: 3    Cardiac:   Cardiac Monitoring: Yes      Cardiac Rhythm: Sinus Rhythm    Access:  Current line(s): PIV     Genitourinary:   Urinary status: voiding    Respiratory:   O2 Device: None (Room air)  Chronic home O2 use?: NO  Incentive spirometer at bedside: N/A       GI:  Last Bowel Movement Date: 09/17/22  Current diet:  ADULT DIET Regular; Low Potassium (Less than 3000 mg/day); Low Phosphorus (Less than 1000 mg)  Passing flatus: YES  Tolerating current diet: YES       Pain Management:   Patient states pain is manageable on current regimen: YES    Skin:  Yong Score: 19  Interventions: increase time out of bed    Patient Safety:  Fall Score:  Total Score: 2  Interventions: pt to call before getting OOB  High Fall Risk: Yes    Length of Stay:  Expected LOS: 5d 4h  Actual LOS: Doug Goldsmith 166, RN

## 2022-09-22 NOTE — PROGRESS NOTES
0700: Bedside shift change report given to Cassy Oliva (oncoming nurse) by Ricardo Castrejon RN (offgoing nurse). Report included the following information SBAR, Kardex, Intake/Output, MAR, and Recent Results. 0700: Lady Suarez will be documenting on patient. [id number]: Patient off the floor to dialysis.

## 2022-09-22 NOTE — PROGRESS NOTES
0230 - RN attempted to draw morning labs and was unable to. Pt wants morning labs drawn with HD. Will notify oncoming nurse.

## 2022-09-22 NOTE — PROCEDURES
Hemodialysis / [phone]    Vitals Pre Post Assessment Pre Post   BP BP: (!) 158/90 (09/22/22 0830)   132/64 LOC A & O x 4 A & o x 4   HR Pulse (Heart Rate): 100 (09/22/22 0830) 83 Lungs Rales LL Fine rales LL   Resp Resp Rate: 16 (09/22/22 0830) 16 Cardiac S1S2 S1S2   Temp Temp: 98.7 °F (37.1 °C) (09/22/22 0800) 97.9 oral Skin Warm dry, sutures to right leg Warm dry, sutures to right leg   Weight  N/a N/a Edema + 1 +1   Tele status yes yes Pain Pain Intensity 1: 6 (09/22/22 0815) 0       Orders   Duration: Start: 0800 End: 1130 Total: 3.5   Dialyzer: Dialyzer/Set Up Inspection: Revaclear (09/22/22 0800)   K Bath: Dialysate K (mEq/L): 2 (09/22/22 0800)   Ca Bath: Dialysate CA (mEq/L): 2.5 (09/22/22 0800)   Na: Dialysate NA (mEq/L): 138 (09/22/22 0800)   Bicarb: Dialysate HCO3 (mEq/L): 40 (09/22/22 0800)   Target Fluid Removal: Goal/Amount of Fluid to Remove (mL): 2500 mL (09/22/22 0800)     Access   AVG   Type & Location: Left upper arm AVG   Comments:       + T/B pre & post HD.  Cannulated x 2 with 15g needles                                 Labs   HBsAg (Antigen) / date:      Neg 1/7/22                                         HBsAb (Antibody) / date: Immune 1/7/22   Source: epic   Obtained/Reviewed  Critical Results Called HGB   Date Value Ref Range Status   09/21/2022 8.2 (L) 12.1 - 17.0 g/dL Final     Potassium   Date Value Ref Range Status   09/20/2022 4.4 3.5 - 5.1 mmol/L Final     Calcium   Date Value Ref Range Status   09/20/2022 7.7 (L) 8.5 - 10.1 MG/DL Final     BUN   Date Value Ref Range Status   09/20/2022 71 (H) 6 - 20 MG/DL Final     Creatinine   Date Value Ref Range Status   09/20/2022 13.30 (H) 0.70 - 1.30 MG/DL Final        Meds Given   Name Dose Route                    Adequacy / Fluid    Total Liters Process: 82.4   Net Fluid Removed: 2500ml      Comments   Time Out Done:   (Time) 0630   Admitting Diagnosis: Esrd  -   Left leg critical limb ischemic - left common femoral to anterior tibial artery bypass with cadaver vein (9/19/22). Hyponatremia  anemia of ckd  Pl. Effusion  Hypertension  Sec. Hyperpara  H/o gastric ca  H/o GI bleed   Consent obtained/signed: Informed Consent Verified: Yes (chronic pt. consent on file) (09/22/22 0800)   Machine / RO # Machine Number: Luis Olivares (09/22/22 0800)   Primary Nurse Rpt Pre: Saray Gibson   Primary Nurse Rpt Post: Lew Heller RN   Pt Education: Access care   Care Plan: Continue HD   Pts outpatient clinic: home hd- f/b [facility] nephrologist     Tx Summary  SUZANNE AVG: skin CDI. No s/s of infection. + B/T. No issues with cannulation or hemostasis. Running well at . Pt arrived to HD [address]. Consent signed & on file. SBAR received from Primary RN. 0800: Pt cannulated with 34H needles per policy & without issue. Labs drawn per request/ order. VSS. Dialysis Tx initiated. 0830: pt. Stable, lines secure and visible  0900: Pt resting quietly. 0930: pt. Stable,lines secure and visible  1000: pt. Stable,lines secure  1030: pt. Resting well. No s/s of distress  1130: Tx ended. VSS. All possible blood returned to patient. Hemostasis achieved without issue. Bed locked and in the lowest position, call bell and belongings in reach. SBAR given to Primary, RN. Patient is stable at time of their/ my departure. All Dialysis related medications have been reviewed. Comments:  RN reviewed LPN assessment and completed RN assessment. .RN completed patient assessment. RN reviewed technicians vital signs and procedure note. Tx completed. Reviewed by MANUELITO Ac

## 2022-09-22 NOTE — PROGRESS NOTES
Problem: Falls - Risk of  Goal: *Absence of Falls  Description: Document Chana Pruett Fall Risk and appropriate interventions in the flowsheet.   Outcome: Progressing Towards Goal  Note: Fall Risk Interventions:  Mobility Interventions: Bed/chair exit alarm, OT consult for ADLs, Patient to call before getting OOB, PT Consult for mobility concerns, PT Consult for assist device competence    Mentation Interventions: Adequate sleep, hydration, pain control, Door open when patient unattended, Bed/chair exit alarm    Medication Interventions: Bed/chair exit alarm, Patient to call before getting OOB, Teach patient to arise slowly         History of Falls Interventions: Bed/chair exit alarm, Door open when patient unattended

## 2022-09-22 NOTE — PROGRESS NOTES
Occupational Therapy    Chart reviewed in prep for skilled OT treatment; however, pt MISSAEL for dialysis. Will defer treatment and follow up later as able and appropriate.     Thank you,  Karlee Cavazos, OT

## 2022-09-22 NOTE — PROGRESS NOTES
Problem: Mobility Impaired (Adult and Pediatric)  Goal: *Acute Goals and Plan of Care (Insert Text)  Description: FUNCTIONAL STATUS PRIOR TO ADMISSION: Patient was independent use of DME.    HOME SUPPORT PRIOR TO ADMISSION: The patient lived with sister and required assistance for home dialysis. Physical Therapy Goals  Initiated 9/20/2022  1. Patient will move from supine to sit and sit to supine , scoot up and down, and roll side to side in bed with independence within 7 day(s). 2.  Patient will transfer from bed to chair and chair to bed with modified independence using the least restrictive device within 7 day(s). 3.  Patient will perform sit to stand with modified independence within 7 day(s). 4.  Patient will ambulate with modified independence for 100 feet with the least restrictive device within 7 day(s). 5.  Patient will ascend/descend 24 stairs with 1 handrail(s) with supervision/set-up within 7 day(s). Outcome: Progressing Towards Goal   PHYSICAL THERAPY TREATMENT  Patient: Kirby Marie (83 y.o. male)  Date: 9/22/2022  Diagnosis: Critical lower limb ischemia ([facility]) [I70.229] <principal problem not specified>  Procedure(s) (LRB):  LEFT FEMORALTO ANTERIOR -TIBIAL BYPASS WITH CADAVER VEIN (Left) 3 Days Post-Op  Precautions:    Chart, physical therapy assessment, plan of care and goals were reviewed. ASSESSMENT  Patient continues with skilled PT services and is slowly progressing towards goals. Patient agreeable to therapy. He continues to report pain in L LE but reports improvements in overall pain. Patient able to complete bed mobility with SBA. He requires CGA for transfers and ambulation in room. Gait is slow and mildly unsteady using RW for support requiring frequent VC for increasing step length on R. Patient noted to demonstrate improved ability to bear weight through LLE. Patient remained in chair at end of session. Patient has 24 steps to enter his 2nd floor apartment.  He currently does not demonstrate the endurance or ability to mobilize distance required to return to home. Recommend rehab at this time. Current Level of Function Impacting Discharge (mobility/balance): SBA to CGA    Other factors to consider for discharge: lives in 2nd story apartment         PLAN :  Patient continues to benefit from skilled intervention to address the above impairments. Continue treatment per established plan of care. to address goals. Recommendation for discharge: (in order for the patient to meet his/her long term goals)  rehab    This discharge recommendation:  Has not yet been discussed the attending provider and/or case management    IF patient discharges home will need the following DME: patient owns DME required for discharge       SUBJECTIVE:   Patient stated I'm doing better.     OBJECTIVE DATA SUMMARY:   Critical Behavior:  Neurologic State: Alert  Orientation Level: Oriented X4  Cognition: Appropriate decision making, Appropriate for age attention/concentration, Appropriate safety awareness, Follows commands     Functional Mobility Training:  Bed Mobility:     Supine to Sit: Stand-by assistance; Additional time     Scooting: Stand-by assistance; Additional time        Transfers:  Sit to Stand: Contact guard assistance  Stand to Sit: Contact guard assistance        Bed to Chair: Contact guard assistance                    Balance:  Sitting: Intact  Standing: Impaired  Standing - Static: Good;Constant support  Standing - Dynamic : Fair;Constant support  Ambulation/Gait Training:  Distance (ft): 25 Feet (ft)  Assistive Device: Walker, rolling;Gait belt  Ambulation - Level of Assistance: Contact guard assistance        Gait Abnormalities: Decreased step clearance; Step to gait; Antalgic (flexed posture)        Base of Support: Shift to right;Narrowed     Speed/Bernarda: Delayed;Pace decreased (<100 feet/min); Slow  Step Length: Right shortened       Pain Rating:  Patient continues to report pain in LLE but improved- he did not rate pain today    Activity Tolerance:   Fair and requires rest breaks    After treatment patient left in no apparent distress:   Sitting in chair, Call bell within reach, and Bed / chair alarm activated    COMMUNICATION/COLLABORATION:   The patients plan of care was discussed with: Registered nurse.      Lin Wolff, PT   Time Calculation: 25 mins

## 2022-09-22 NOTE — PROGRESS NOTES
TRANSFER - OUT REPORT:    Verbal report given to Amira Romero RN(name[facility](unit[facility] for ordered procedure       Report consisted of patients Situation, Background, Assessment and   Recommendations(SBAR). Information from the following report(s) Kardex was reviewed with the receiving nurse. Opportunity for questions and clarification was provided.       Patient transported with:   [employer]

## 2022-09-22 NOTE — PROGRESS NOTES
Nephrology Progress Note  [facility] / [address] [place], [address]  Phone - [phone]  Fax - [phone]                 Patient: Elvin Salgado                   YOB: 1975        Date- 9/22/2022                      Admit Date: 9/19/2022  CC: Follow up for ESRD          IMPRESSION & PLAN:   ESRD  - home hd- f/b [facility] nephrologist  Left leg critical limb ischemic - left common femoral to anterior tibial artery bypass with cadaver vein (9/19/22). Hyponatremia  anemia of ckd  Pl. Effusion  Hypertension  Sec. Hyperpara  H/o gastric ca  H/o GI bleed    PLAN-  Seen on hd today  Continue calcitriol  Continue epogen  Continue phoslo, renvela     Subjective: Interval History:   -  seen on hd today  Bp stable  No sob      Objective:   Vitals:    09/22/22 1030 09/22/22 1045 09/22/22 1100 09/22/22 1115   BP: (!) 131/41 (!) 138/35 (!) 209/61 (!) 116/38   Pulse: 76 82 82 80   Resp: 16 18 16 16   Temp:       TempSrc:       SpO2:       Weight:       Height:          09/21 0701 - 09/22 0700  In: 720 [P.O.:720]  Out: 0   Last 3 Recorded Weights in this Encounter    09/19/22 1346 09/21/22 0630   Weight: 57.4 kg (126 lb 8.7 oz) 61.7 kg (136 lb 1.6 oz)        Physical exam:    GEN: nad  NECK- Supple, no mass  RESP: No wheezing, clear  b/l  CVS: S1,S2  RRR  NEURO: Normal speech, non focal  EXT:upper arm avg +, left leg staples +  PSYCH: Normal Mood    Chart reviewed. Pertinent Notes reviewed.      Data Review :  Recent Labs     09/22/22  0832 09/20/22  1606 09/19/22  1527   * 130* 132*   K 4.0 4.4 3.8   CL 94* 94* 95*   CO2 31 26 21   BUN 43* 71* 54*   CREA 8.94* 13.30* 11.40*   GLU 84 136* 65   CA 8.5 7.7* 7.8*   PHOS 4.5 6.2*  --        Recent Labs     09/21/22  0403 09/20/22  1606   WBC 7.0 9.0   HGB 8.2* 8.7*   HCT 25.0* 26.2*   * 164       No results for input(s): FE, TIBC, PSAT, FERR in the last 72 hours.    Lab Results   Component Value Date/Time    Hemoglobin A1c 3.8 (L) 05/22/2012 05:00 PM        No results found for: MCACR, MCA1, MCA2, MCA3, MCAU, MCAU2, MCALPOCT  Lab Results   Component Value Date/Time    BNP 1,946 (H) 09/16/2014 04:17 AM     (H) 02/07/2012 03:00 AM    NT pro- (H) 09/06/2009 04:25 AM     US Results (most recent):  Medication list  reviewed  Current Facility-Administered Medications   Medication Dose Route Frequency    HYDROmorphone (DILAUDID) injection 2 mg  2 mg IntraVENous Q3H PRN    alcohol 62% (NOZIN) nasal  1 Ampule  1 Ampule Topical Q12H    oxyCODONE-acetaminophen (PERCOCET) 5-325 mg per tablet 1-2 Tablet  1-2 Tablet Oral Q4H PRN    epoetin emilie (EPOGEN;PROCRIT) injection 20,000 Units  20,000 Units SubCUTAneous Q TUE, THU & SAT    albumin human 25% (BUMINATE) solution 25 g  25 g IntraVENous DIALYSIS PRN    aspirin delayed-release tablet 81 mg  81 mg Oral DAILY    atorvastatin (LIPITOR) tablet 20 mg  20 mg Oral DAILY    calcitRIOL (ROCALTROL) capsule 0.5 mcg  0.5 mcg Oral DAILY    calcium acetate(phosphat bind) (PHOSLO) capsule 1,334 mg  2 Capsule Oral TID WITH MEALS    metoprolol succinate (TOPROL-XL) XL tablet 12.5 mg  12.5 mg Oral DAILY    pantoprazole (PROTONIX) tablet 40 mg  40 mg Oral ACB&D    sevelamer carbonate (RENVELA) tab 1,600 mg  1,600 mg Oral TID    bisacodyL (DULCOLAX) suppository 10 mg  10 mg Rectal DAILY PRN    ondansetron (ZOFRAN) injection 4 mg  4 mg IntraVENous Q6H PRN    acetaminophen (TYLENOL) tablet 650 mg  650 mg Oral Q4H PRN        Rosalind Rojas MD  9/22/2022

## 2022-09-22 NOTE — PROGRESS NOTES
Vascular Surgery Progress Note  Terrial Expose ACNP-BC  9/22/2022       Subjective:     Mr. Darlene Guthrie is a 42 y.o. AAM with a pmhx significant for ESRF on home HD, CAD, HTN, HLD, systolic CHF s/p ICD placement, multiple DVTs currently w/ IVC filter (1/4/22), HIT, small bowel necrosis, GERD, and multiple gastric ulcers s/p TIPS (12/30/2022). He has not been on anticoagulation since his TIPS procedure. He is well known to the practice for the management of PAD and HD access. He currently has a LUE Harrison-Ryan aVG (9/2020). He has chronic central venous syndrome requiring multiple fistulograms (last 9/2/22). He has a remote hx of LLE bypass graft that recently occluded. He is admitted to the hospital s/p a left common femoral to anterior tibial artery bypass with cadaver vein (9/19/22). No acute changes. Slow to mobilize.      Nursing Data:     Patient Vitals for the past 24 hrs:   BP Temp Temp src Pulse Resp SpO2   09/22/22 1115 (!) 116/38 -- -- 80 16 --   09/22/22 1100 (!) 209/61 -- -- 82 16 --   09/22/22 1045 (!) 138/35 -- -- 82 18 --   09/22/22 1030 (!) 131/41 -- -- 76 16 --   09/22/22 1015 (!) 148/48 -- -- 65 16 --   09/22/22 1000 (!) 154/41 -- -- 71 16 --   09/22/22 0945 (!) 179/75 -- -- 80 18 --   09/22/22 0930 (!) 171/70 -- -- 65 16 --   09/22/22 0915 (!) 144/79 -- -- 68 18 --   09/22/22 0900 (!) 145/76 -- -- 68 16 --   09/22/22 0845 (!) 155/71 -- -- 73 18 --   09/22/22 0830 (!) 158/90 -- -- 100 16 --   09/22/22 0815 (!) 146/74 -- -- 86 16 --   09/22/22 0800 (!) 140/64 98.7 °F (37.1 °C) Oral 93 16 --   09/22/22 0727 136/60 98 °F (36.7 °C) -- 87 16 95 %   09/22/22 0256 139/64 98 °F (36.7 °C) -- 78 16 95 %   09/21/22 2305 (!) 146/97 98 °F (36.7 °C) -- 90 16 95 %   09/21/22 1915 124/65 97.7 °F (36.5 °C) -- 91 16 96 %   09/21/22 1448 (!) 127/57 97.5 °F (36.4 °C) -- 86 16 98 %           Intake/Output Summary (Last 24 hours) at 9/22/2022 1127  Last data filed at 9/21/2022 1727  Gross per 24 hour   Intake 720 ml   Output 0 ml   Net 720 ml         Exam:     Physical Exam  Constitutional:       Comments: Chronically ill appearing. Appears older than stated age. HENT:      Mouth/Throat:      Mouth: Mucous membranes are moist.   Eyes:      Pupils: Pupils are equal, round, and reactive to light. Cardiovascular:      Rate and Rhythm: Normal rate and regular rhythm. Comments: Left foot is warm and perfused  Pulmonary:      Effort: Pulmonary effort is normal. No respiratory distress. Abdominal:      General: There is no distension. Palpations: Abdomen is soft. Musculoskeletal:         General: Normal range of motion. Skin:     Coloration: Skin is pale. Comments: Surgical incisions is clean dry and intact. Neurological:      Mental Status: He is alert. Mental status is at baseline. Lab Review:     . Recent Results (from the past 24 hour(s))   RENAL FUNCTION PANEL    Collection Time: 09/22/22  8:32 AM   Result Value Ref Range    Sodium 133 (L) 136 - 145 mmol/L    Potassium 4.0 3.5 - 5.1 mmol/L    Chloride 94 (L) 97 - 108 mmol/L    CO2 31 21 - 32 mmol/L    Anion gap 8 5 - 15 mmol/L    Glucose 84 65 - 100 mg/dL    BUN 43 (H) 6 - 20 MG/DL    Creatinine 8.94 (H) 0.70 - 1.30 MG/DL    BUN/Creatinine ratio 5 (L) 12 - 20      GFR est AA 8 (L) >60 ml/min/1.73m2    GFR est non-AA 6 (L) >60 ml/min/1.73m2    Calcium 8.5 8.5 - 10.1 MG/DL    Phosphorus 4.5 2.6 - 4.7 MG/DL    Albumin 3.1 (L) 3.5 - 5.0 g/dL   SAMPLES BEING HELD    Collection Time: 09/22/22  8:32 AM   Result Value Ref Range    SAMPLES BEING HELD LAV     COMMENT        Add-on orders for these samples will be processed based on acceptable specimen integrity and analyte stability, which may vary by analyte. Assessment/Plan:     Left leg critical limb ischemic   - left common femoral to anterior tibial artery bypass with cadaver vein (9/19/22). -leukocytosis resolved. Mild thrombocytopenia post procedure as expected.    -ASA initiated after discussion with Dr. Senait Guillory. Continue to encourage OOB/PT/OT. WBAT. Keep LLE elevated when not ambulating. Float heels at all times. Encourage I-S.    ESRF   -on home HD M-F via LUE Watson-Ryan aVG  Hyponatremia  Anemia of chronic disease  -stable  Hypoalbuminemia   Appreciate input from Nephrology. HD TTS while admitted. Coronary artery disease   HFrEF w/ hx of ICD  Hypertension  Hyperlipidemia  -Euvolemic.  BP labile.   -Lipitor & metoprolol    Hx of recurrent GIB  Hx of multiple gastric ulcer  -s/p TIPS (12/30/21)  -last EGD 4/2022  Hx of small bowel necrosis   GERD  -Protonix  -Dr. Asim York    VTE prophylaxis:  Hx of multiple DVTs   -currently w/ IVC filter (1/4/22)  Hx of HIT  SCDs contraindicated in diffuse PAD  Encourage OOB     Disposition:   Home in 1-2 days

## 2022-09-23 LAB
ALBUMIN SERPL-MCNC: 2.9 G/DL (ref 3.5–5)
ANION GAP SERPL CALC-SCNC: 7 MMOL/L (ref 5–15)
BASOPHILS # BLD: 0.1 K/UL (ref 0–0.1)
BASOPHILS NFR BLD: 1 % (ref 0–1)
BUN SERPL-MCNC: 23 MG/DL (ref 6–20)
BUN/CREAT SERPL: 4 (ref 12–20)
CALCIUM SERPL-MCNC: 8.7 MG/DL (ref 8.5–10.1)
CHLORIDE SERPL-SCNC: 96 MMOL/L (ref 97–108)
CO2 SERPL-SCNC: 31 MMOL/L (ref 21–32)
CREAT SERPL-MCNC: 5.28 MG/DL (ref 0.7–1.3)
DIFFERENTIAL METHOD BLD: ABNORMAL
EOSINOPHIL # BLD: 0.2 K/UL (ref 0–0.4)
EOSINOPHIL NFR BLD: 3 % (ref 0–7)
ERYTHROCYTE [DISTWIDTH] IN BLOOD BY AUTOMATED COUNT: 15 % (ref 11.5–14.5)
GLUCOSE SERPL-MCNC: 103 MG/DL (ref 65–100)
HBV CORE IGM SER QL: NONREACTIVE
HBV SURFACE AB SER QL: REACTIVE
HBV SURFACE AB SER-ACNC: 593.97 MIU/ML
HCT VFR BLD AUTO: 25.4 % (ref 36.6–50.3)
HGB BLD-MCNC: 8.3 G/DL (ref 12.1–17)
IMM GRANULOCYTES # BLD AUTO: 0 K/UL (ref 0–0.04)
IMM GRANULOCYTES NFR BLD AUTO: 0 % (ref 0–0.5)
LYMPHOCYTES # BLD: 1.3 K/UL (ref 0.8–3.5)
LYMPHOCYTES NFR BLD: 16 % (ref 12–49)
MCH RBC QN AUTO: 30.4 PG (ref 26–34)
MCHC RBC AUTO-ENTMCNC: 32.7 G/DL (ref 30–36.5)
MCV RBC AUTO: 93 FL (ref 80–99)
MONOCYTES # BLD: 1 K/UL (ref 0–1)
MONOCYTES NFR BLD: 13 % (ref 5–13)
NEUTS SEG # BLD: 5.2 K/UL (ref 1.8–8)
NEUTS SEG NFR BLD: 67 % (ref 32–75)
NRBC # BLD: 0 K/UL (ref 0–0.01)
NRBC BLD-RTO: 0 PER 100 WBC
PHOSPHATE SERPL-MCNC: 3.4 MG/DL (ref 2.6–4.7)
PLATELET # BLD AUTO: 154 K/UL (ref 150–400)
PMV BLD AUTO: 10 FL (ref 8.9–12.9)
POTASSIUM SERPL-SCNC: 3.7 MMOL/L (ref 3.5–5.1)
RBC # BLD AUTO: 2.73 M/UL (ref 4.1–5.7)
SODIUM SERPL-SCNC: 134 MMOL/L (ref 136–145)
WBC # BLD AUTO: 7.8 K/UL (ref 4.1–11.1)

## 2022-09-23 PROCEDURE — 97110 THERAPEUTIC EXERCISES: CPT

## 2022-09-23 PROCEDURE — 87340 HEPATITIS B SURFACE AG IA: CPT

## 2022-09-23 PROCEDURE — 74011250637 HC RX REV CODE- 250/637: Performed by: NURSE PRACTITIONER

## 2022-09-23 PROCEDURE — 36415 COLL VENOUS BLD VENIPUNCTURE: CPT

## 2022-09-23 PROCEDURE — 85025 COMPLETE CBC W/AUTO DIFF WBC: CPT

## 2022-09-23 PROCEDURE — 74011250636 HC RX REV CODE- 250/636: Performed by: SURGERY

## 2022-09-23 PROCEDURE — 74011250636 HC RX REV CODE- 250/636: Performed by: NURSE PRACTITIONER

## 2022-09-23 PROCEDURE — 65270000029 HC RM PRIVATE

## 2022-09-23 PROCEDURE — 74011250636 HC RX REV CODE- 250/636: Performed by: INTERNAL MEDICINE

## 2022-09-23 PROCEDURE — 86705 HEP B CORE ANTIBODY IGM: CPT

## 2022-09-23 PROCEDURE — 74011250637 HC RX REV CODE- 250/637: Performed by: SURGERY

## 2022-09-23 PROCEDURE — 97535 SELF CARE MNGMENT TRAINING: CPT

## 2022-09-23 PROCEDURE — 80069 RENAL FUNCTION PANEL: CPT

## 2022-09-23 PROCEDURE — 86706 HEP B SURFACE ANTIBODY: CPT

## 2022-09-23 RX ORDER — HYDROMORPHONE HYDROCHLORIDE 1 MG/ML
2 INJECTION, SOLUTION INTRAMUSCULAR; INTRAVENOUS; SUBCUTANEOUS
Status: DISCONTINUED | OUTPATIENT
Start: 2022-09-23 | End: 2022-09-25 | Stop reason: HOSPADM

## 2022-09-23 RX ADMIN — HYDROMORPHONE HYDROCHLORIDE 2 MG: 2 INJECTION, SOLUTION INTRAMUSCULAR; INTRAVENOUS; SUBCUTANEOUS at 09:07

## 2022-09-23 RX ADMIN — METOPROLOL SUCCINATE 12.5 MG: 25 TABLET, EXTENDED RELEASE ORAL at 09:06

## 2022-09-23 RX ADMIN — SEVELAMER CARBONATE 1600 MG: 800 TABLET, FILM COATED ORAL at 15:48

## 2022-09-23 RX ADMIN — Medication 1 AMPULE: at 09:06

## 2022-09-23 RX ADMIN — IRON SUCROSE 200 MG: 20 INJECTION, SOLUTION INTRAVENOUS at 10:24

## 2022-09-23 RX ADMIN — SEVELAMER CARBONATE 1600 MG: 800 TABLET, FILM COATED ORAL at 21:14

## 2022-09-23 RX ADMIN — ATORVASTATIN CALCIUM 20 MG: 20 TABLET, FILM COATED ORAL at 09:07

## 2022-09-23 RX ADMIN — HYDROMORPHONE HYDROCHLORIDE 2 MG: 2 INJECTION, SOLUTION INTRAMUSCULAR; INTRAVENOUS; SUBCUTANEOUS at 05:08

## 2022-09-23 RX ADMIN — CALCIUM ACETATE 1334 MG: 667 CAPSULE ORAL at 12:42

## 2022-09-23 RX ADMIN — HYDROMORPHONE HYDROCHLORIDE 2 MG: 2 INJECTION, SOLUTION INTRAMUSCULAR; INTRAVENOUS; SUBCUTANEOUS at 12:42

## 2022-09-23 RX ADMIN — CALCIUM ACETATE 1334 MG: 667 CAPSULE ORAL at 16:01

## 2022-09-23 RX ADMIN — HYDROMORPHONE HYDROCHLORIDE 2 MG: 1 INJECTION, SOLUTION INTRAMUSCULAR; INTRAVENOUS; SUBCUTANEOUS at 21:14

## 2022-09-23 RX ADMIN — OXYCODONE AND ACETAMINOPHEN 2 TABLET: 5; 325 TABLET ORAL at 03:28

## 2022-09-23 RX ADMIN — SEVELAMER CARBONATE 1600 MG: 800 TABLET, FILM COATED ORAL at 09:06

## 2022-09-23 RX ADMIN — OXYCODONE AND ACETAMINOPHEN 2 TABLET: 5; 325 TABLET ORAL at 23:58

## 2022-09-23 RX ADMIN — PANTOPRAZOLE SODIUM 40 MG: 40 TABLET, DELAYED RELEASE ORAL at 07:44

## 2022-09-23 RX ADMIN — PANTOPRAZOLE SODIUM 40 MG: 40 TABLET, DELAYED RELEASE ORAL at 15:48

## 2022-09-23 RX ADMIN — HYDROMORPHONE HYDROCHLORIDE 2 MG: 2 INJECTION, SOLUTION INTRAMUSCULAR; INTRAVENOUS; SUBCUTANEOUS at 02:22

## 2022-09-23 RX ADMIN — CALCIUM ACETATE 1334 MG: 667 CAPSULE ORAL at 07:44

## 2022-09-23 RX ADMIN — HYDROMORPHONE HYDROCHLORIDE 2 MG: 1 INJECTION, SOLUTION INTRAMUSCULAR; INTRAVENOUS; SUBCUTANEOUS at 18:22

## 2022-09-23 RX ADMIN — OXYCODONE AND ACETAMINOPHEN 2 TABLET: 5; 325 TABLET ORAL at 13:38

## 2022-09-23 RX ADMIN — ASPIRIN 81 MG: 81 TABLET, COATED ORAL at 09:06

## 2022-09-23 RX ADMIN — HYDROMORPHONE HYDROCHLORIDE 2 MG: 2 INJECTION, SOLUTION INTRAMUSCULAR; INTRAVENOUS; SUBCUTANEOUS at 15:48

## 2022-09-23 RX ADMIN — Medication 1 AMPULE: at 21:22

## 2022-09-23 RX ADMIN — CALCITRIOL CAPSULES 0.25 MCG 0.5 MCG: 0.25 CAPSULE ORAL at 09:07

## 2022-09-23 RX ADMIN — OXYCODONE AND ACETAMINOPHEN 2 TABLET: 5; 325 TABLET ORAL at 07:44

## 2022-09-23 NOTE — PROGRESS NOTES
Transition of Care Plan:     RUR: 22%     Disposition:Rehab     Follow up appointments: To be done by rehab facility when discharged     DME needed:None     Transportation at . [place] [id number] or means to access home:  Sister has keys      IM Medicare Letter: To be given prior to discharge     Is patient a [place] and connected with the [place]? No              If yes, was [employer] transfer form completed and VA notified? N/A     Caregiver Contact:Sister     Discharge Caregiver contacted prior to discharge? Caregiver to be contacted prior to discharge. Care Conference needed?:    No      [facility] can accept patient over the weekend as long as the hepatitis antigen panel has been resulted. It was ordered and drawn today. Sybil Espino  RN BSN CRM        [phone]

## 2022-09-23 NOTE — PROGRESS NOTES
Problem: Falls - Risk of  Goal: *Absence of Falls  Description: Document Vitaly Spencer Fall Risk and appropriate interventions in the flowsheet. Outcome: Progressing Towards Goal  Note: Fall Risk Interventions:  Mobility Interventions: Assess mobility with egress test, Bed/chair exit alarm, Communicate number of staff needed for ambulation/transfer, OT consult for ADLs, Patient to call before getting OOB, PT Consult for mobility concerns, PT Consult for assist device competence, Strengthening exercises (ROM-active/passive), Utilize walker, cane, or other assistive device    Mentation Interventions: Adequate sleep, hydration, pain control, Bed/chair exit alarm    Medication Interventions: Assess postural VS orthostatic hypotension, Bed/chair exit alarm, Patient to call before getting OOB, Teach patient to arise slowly         History of Falls Interventions: Bed/chair exit alarm, Door open when patient unattended         Problem: Patient Education: Go to Patient Education Activity  Goal: Patient/Family Education  Outcome: Progressing Towards Goal     Problem: Pressure Injury - Risk of  Goal: *Prevention of pressure injury  Description: Document Yong Scale and appropriate interventions in the flowsheet. Outcome: Progressing Towards Goal  Note: Pressure Injury Interventions:  Sensory Interventions: Assess changes in LOC    Moisture Interventions: Absorbent underpads, Check for incontinence Q2 hours and as needed    Activity Interventions: Assess need for specialty bed, PT/OT evaluation    Mobility Interventions: HOB 30 degrees or less, PT/OT evaluation, Turn and reposition approx.  every two hours(pillow and wedges)    Nutrition Interventions: Document food/fluid/supplement intake, Discuss nutritional consult with provider    Friction and Shear Interventions: Apply protective barrier, creams and emollients, HOB 30 degrees or less, Lift sheet, Transferring/repositioning devices                Problem: Patient Education: Go to Patient Education Activity  Goal: Patient/Family Education  Outcome: Progressing Towards Goal     Problem: Patient Education: Go to Patient Education Activity  Goal: Patient/Family Education  Outcome: Progressing Towards Goal     Problem: Patient Education: Go to Patient Education Activity  Goal: Patient/Family Education  Outcome: Progressing Towards Goal     Problem: Pressure Injury - Risk of  Goal: *Prevention of pressure injury  Description: Document Yong Scale and appropriate interventions in the flowsheet. Outcome: Progressing Towards Goal  Note: Pressure Injury Interventions:  Sensory Interventions: Assess changes in LOC    Moisture Interventions: Absorbent underpads, Check for incontinence Q2 hours and as needed    Activity Interventions: Assess need for specialty bed, PT/OT evaluation    Mobility Interventions: HOB 30 degrees or less, PT/OT evaluation, Turn and reposition approx.  every two hours(pillow and wedges)    Nutrition Interventions: Document food/fluid/supplement intake, Discuss nutritional consult with provider    Friction and Shear Interventions: Apply protective barrier, creams and emollients, HOB 30 degrees or less, Lift sheet, Transferring/repositioning devices                Problem: Patient Education: Go to Patient Education Activity  Goal: Patient/Family Education  Outcome: Progressing Towards Goal

## 2022-09-23 NOTE — PROGRESS NOTES
1409 Attempted to give transfer report  1500 TRANSFER - OUT REPORT:    Verbal report given to MANUELITO Morel (name) on Vin Damon  being transferred to Gen Surg(unit),  [id number] for routine progression of care       Report consisted of patients Situation, Background, Assessment and   Recommendations(SBAR). Information from the following report(s) SBAR, Kardex, OR Summary, and Cardiac Rhythm NSR  was reviewed with the receiving nurse. Lines:   Peripheral IV 09/22/22 Posterior;Right Wrist (Active)   Site Assessment Clean, dry, & intact 09/23/22 1114   Phlebitis Assessment 0 09/23/22 1114   Infiltration Assessment 0 09/23/22 1114   Dressing Status Clean, dry, & intact 09/23/22 1114   Dressing Type Transparent;Tape 09/23/22 1114   Hub Color/Line Status Yellow; Flushed 09/23/22 1114   Action Taken Tubing changed 09/23/22 1114   Alcohol Cap Used Yes 09/23/22 1114        Opportunity for questions and clarification was provided.         165 Patient left PCU via transporter with wheelchair, pt's belongings

## 2022-09-23 NOTE — PROGRESS NOTES
Problem: Self Care Deficits Care Plan (Adult)  Goal: *Acute Goals and Plan of Care (Insert Text)  Description: FUNCTIONAL STATUS PRIOR TO ADMISSION: The patient reports being independent with ADLs and functional mobility. He owns a rollator which he has used in the past but recently was not using and AD. HOME SUPPORT: The patient lived with his sister. Occupational Therapy Goals  Initiated 9/20/2022  1. Patient will perform grooming with modified independence within 7 day(s). 2.  Patient will perform lower body dressing with minimal assistance/contact guard assist within 7 day(s). 3.  Patient will perform toilet transfers with modified independence within 7 day(s). 4.  Patient will perform all aspects of toileting with modified independence within 7 day(s). 5.  Patient will participate in upper extremity therapeutic exercise/activities with independence for 5 minutes within 7 day(s). 6.  Patient will utilize energy conservation techniques during functional activities with verbal cues within 7 day(s). Outcome: Progressing Towards Goal    OCCUPATIONAL THERAPY TREATMENT  Patient: Vin Damon (89 y.o. male)  Date: 9/23/2022  Diagnosis: Critical lower limb ischemia ([facility] [address] [I70.229] <principal problem not specified>  Procedure(s) (LRB):  LEFT FEMORALTO ANTERIOR -TIBIAL BYPASS WITH CADAVER VEIN (Left) 4 Days Post-Op  Precautions:    Chart, occupational therapy assessment, plan of care, and goals were reviewed. ASSESSMENT  Patient continues with skilled OT services and is progressing towards goals. Pt noted with progressive mobility/balance and grooming independence, functionally evidenced by completing RW level standing grooming with SBA, with no LOB and mild c/o fatigue, requiring seated rest break at EOB. Pt with c/o LLE pain s/p mobility/balance, with RN aware and following.   Pt verbalized good understanding of modified dressing techniques, as well as, demonstrated good distal LE ADL management while long-sitting on bed. Pt continues to benefit from skilled OT during acute hospitalization. Current Level of Function Impacting Discharge (ADLs): SBA    Other factors to consider for discharge: LLE pain with mobility/balance         PLAN :  Patient continues to benefit from skilled intervention to address the above impairments. Continue treatment per established plan of care to address goals. Recommend with staff: OOB meals, Active ADL engagement    Recommend next OT session: POC progression    Recommendation for discharge: (in order for the patient to meet his/her long term goals)  To be determined: rehab    This discharge recommendation:  Has been made in collaboration with the attending provider and/or case management    IF patient discharges home will need the following DME: patient owns DME required for discharge       SUBJECTIVE:   Patient stated [facility].     OBJECTIVE DATA SUMMARY:   Cognitive/Behavioral Status:  Neurologic State: Alert  Orientation Level: Oriented X4  Cognition: Appropriate decision making; Appropriate for age attention/concentration; Appropriate safety awareness  Perception: Appears intact  Perseveration: No perseveration noted  Safety/Judgement: Awareness of environment;Home safety; Insight into deficits    Functional Mobility and Transfers for ADLs:  Bed Mobility:  Rolling: Modified independent  Supine to Sit: Stand-by assistance  Scooting: Modified independent    Transfers:  Sit to Stand: Stand-by assistance          Balance:  Sitting: Intact  Standing: Intact; With support  Standing - Static: Good;Constant support  Standing - Dynamic : Good;Constant support    ADL Intervention:  Grooming  Grooming Assistance: Stand-by assistance  Position Performed: Standing (RW)  Washing Hands: Stand-by assistance  Brushing Teeth: Stand-by assistance    Lower Body Dressing Assistance  Socks: Modified independent  Leg Crossed Method Used: Yes  Position Performed: Long sitting on bed    Cognitive Retraining  Safety/Judgement: Awareness of environment;Home safety; Insight into deficits    Pain:  C/o LLE pain, did not quantify; RN aware and following    Activity Tolerance:   Good, Fair, and requires rest breaks    After treatment patient left in no apparent distress:   Call bell within reach, Bed / chair alarm activated, and sitting EOB with RN aware and following    COMMUNICATION/COLLABORATION:   The patients plan of care was discussed with: Physical therapist and Registered nurse.      Xuan Maldonado OT  Time Calculation: 14 mins

## 2022-09-23 NOTE — PROGRESS NOTES
Nephrology Progress Note  [facility] / [address] [place] [address] Dates  [address], [address]  Phone - [phone]  Fax - [phone]                 Patient: German Story                   YOB: 1975        Date- 9/23/2022                      Admit Date: 9/19/2022  CC: Follow up for esrd          IMPRESSION & PLAN:   ESRD - home hd- f/b [facility] nephrologist  Left leg critical limb ischemic - left common femoral to anterior tibial artery bypass with cadaver vein (9/19/22). PVD  Hyponatremia  anemia of ckd  Pl. Effusion  Hypertension  Sec. Hyperpara  H/o gastric ca  H/o GI bleed    PLAN-  No dialysis today  Venofer iv today and tomorrow  Continue hd TTS schedule  Continue calcitriol  Continue epogen  Continue phoslo, renvela     Subjective: Interval History:   -  s/p hd yesterday  No c/o sob,  No c/o chest pain,   No c/o nausea or vomiting, No c/o  fever. Objective:   Vitals:    09/22/22 2245 09/23/22 0321 09/23/22 0737 09/23/22 0913   BP:  (!) 117/57 (!) 112/57 (!) (P) 152/65   Pulse: (!) 110 93 83 (P) 89   Resp: 27 17 14 (P) 16   Temp:  98.4 °F (36.9 °C) 97.5 °F (36.4 °C)    TempSrc:       SpO2: 92% 93% 97% (P) 96%   Weight:  55.8 kg (123 lb)     Height:  5' 7\" (1.702 m)        09/22 0701 - 09/23 0700  In: 300 [P.O.:300]  Out: 2500   Last 3 Recorded Weights in this Encounter    09/19/22 1346 09/21/22 0630 09/23/22 0321   Weight: 57.4 kg (126 lb 8.7 oz) 61.7 kg (136 lb 1.6 oz) 55.8 kg (123 lb)        Physical exam:    GEN:  NAD  NECK:  Supple, no thyromegaly  RESP: Clear  b/l, no  wheezing,   CVS: RRR,S1,S2   NEURO: non focal, normal speech  EXT:upper arm avg +, left leg staples +  PSYCH: Normal Mood    Chart reviewed. Pertinent Notes reviewed.      Data Review :  Recent Labs     09/23/22  0007 09/22/22  0832 09/20/22  1606   * 133* 130*   K 3.7 4.0 4.4   CL 96* 94* 94*   CO2 31 31 26   BUN 23* 43* 71*   CREA 5.28* 8.94* 13.30*   * 84 136*   CA 8.7 8.5 7.7*   PHOS 3.4 4.5 6.2*       Recent Labs     09/23/22  0007 09/21/22  0403 09/20/22  1606   WBC 7.8 7.0 9.0   HGB 8.3* 8.2* 8.7*   HCT 25.4* 25.0* 26.2*    148* 164       No results for input(s): FE, TIBC, PSAT, FERR in the last 72 hours.    Lab Results   Component Value Date/Time    Hemoglobin A1c 3.8 (L) 05/22/2012 05:00 PM        No results found for: MCACR, MCA1, MCA2, MCA3, MCAU, MCAU2, MCALPOCT  Lab Results   Component Value Date/Time    BNP 1,946 (H) 09/16/2014 04:17 AM     (H) 02/07/2012 03:00 AM    NT pro- (H) 09/06/2009 04:25 AM     US Results (most recent):  Medication list  reviewed  Current Facility-Administered Medications   Medication Dose Route Frequency    HYDROmorphone (DILAUDID) injection 2 mg  2 mg IntraVENous Q3H PRN    alcohol 62% (NOZIN) nasal  1 Ampule  1 Ampule Topical Q12H    oxyCODONE-acetaminophen (PERCOCET) 5-325 mg per tablet 1-2 Tablet  1-2 Tablet Oral Q4H PRN    epoetin emilie (EPOGEN;PROCRIT) injection 20,000 Units  20,000 Units SubCUTAneous Q TUE, THU & SAT    albumin human 25% (BUMINATE) solution 25 g  25 g IntraVENous DIALYSIS PRN    aspirin delayed-release tablet 81 mg  81 mg Oral DAILY    atorvastatin (LIPITOR) tablet 20 mg  20 mg Oral DAILY    calcitRIOL (ROCALTROL) capsule 0.5 mcg  0.5 mcg Oral DAILY    calcium acetate(phosphat bind) (PHOSLO) capsule 1,334 mg  2 Capsule Oral TID WITH MEALS    metoprolol succinate (TOPROL-XL) XL tablet 12.5 mg  12.5 mg Oral DAILY    pantoprazole (PROTONIX) tablet 40 mg  40 mg Oral ACB&D    sevelamer carbonate (RENVELA) tab 1,600 mg  1,600 mg Oral TID    bisacodyL (DULCOLAX) suppository 10 mg  10 mg Rectal DAILY PRN    ondansetron (ZOFRAN) injection 4 mg  4 mg IntraVENous Q6H PRN    acetaminophen (TYLENOL) tablet 650 mg  650 mg Oral Q4H PRN        Inge Wilson MD  9/23/2022

## 2022-09-23 NOTE — PROGRESS NOTES
Bedside and Bedside shift change report given to Shine Basurto (oncoming nurse) by Nazario Ely RN (offgoing nurse). Report included the following information SBAR, Kardex, Procedure Summary, Intake/Output, MAR, Accordion, Recent Results, Med Rec Status, Cardiac Rhythm NSR, Sinus Tach 120s bpm with activity, and Alarm Parameters .

## 2022-09-23 NOTE — PROGRESS NOTES
Problem: Mobility Impaired (Adult and Pediatric)  Goal: *Acute Goals and Plan of Care (Insert Text)  Description: FUNCTIONAL STATUS PRIOR TO ADMISSION: Patient was independent use of DME.    HOME SUPPORT PRIOR TO ADMISSION: The patient lived with sister and required assistance for home dialysis. Physical Therapy Goals  Initiated 9/20/2022  1. Patient will move from supine to sit and sit to supine , scoot up and down, and roll side to side in bed with independence within 7 day(s). 2.  Patient will transfer from bed to chair and chair to bed with modified independence using the least restrictive device within 7 day(s). 3.  Patient will perform sit to stand with modified independence within 7 day(s). 4.  Patient will ambulate with modified independence for 100 feet with the least restrictive device within 7 day(s). 5.  Patient will ascend/descend 24 stairs with 1 handrail(s) with supervision/set-up within 7 day(s). Outcome: Progressing Towards Goal   PHYSICAL THERAPY TREATMENT  Patient: Lb Coker (14 y.o. male)  Date: 9/23/2022  Diagnosis: Critical lower limb ischemia ([facility]) [I70.229] <principal problem not specified>  Procedure(s) (LRB):  LEFT FEMORALTO ANTERIOR -TIBIAL BYPASS WITH CADAVER VEIN (Left) 4 Days Post-Op  Precautions:    Chart, physical therapy assessment, plan of care and goals were reviewed. ASSESSMENT  Patient continues with skilled PT services and is progressing towards goals. Pt in bed on arrival and had just work with OT, who reported pt with progressive mobility/standing balance and overall functional independence at RW level this date. Pt continues to report LLE pain and educated him that it is normal.  He participated in BLE there ex and agreed to perform 2-3 times daily. Pt should progress well in rehab and be able to [facility].      Current Level of Function Impacting Discharge (mobility/balance): mod I to SBA with RW for short ambulation distances, but continues with impaired activity tolerance     Other factors to consider for discharge: functioning below his PLOF         PLAN :  Patient continues to benefit from skilled intervention to address the above impairments. Continue treatment per established plan of care. to address goals. Recommendation for discharge: (in order for the patient to meet his/her long term goals)  Therapy 3 hours per day 5-7 days per week    This discharge recommendation:  Has been made in collaboration with the attending provider and/or case management    IF patient discharges home will need the following DME: to be determined (TBD)       SUBJECTIVE:   Patient stated I want to get back to driving and doing things like I was.     OBJECTIVE DATA SUMMARY:   Critical Behavior:  Neurologic State: Alert  Orientation Level: Oriented X4  Cognition: Appropriate decision making, Appropriate for age attention/concentration, Appropriate safety awareness  Safety/Judgement: Awareness of environment, Home safety, Insight into deficits  Functional Mobility Training:  Bed Mobility:  Rolling: Modified independent  Supine to Sit: Stand-by assistance     Scooting: Modified independent        Transfers:  Sit to Stand: Stand-by assistance  Stand to Sit: Stand-by assistance               Balance:  Sitting: Intact  Standing: Intact; With support  Standing - Static: Good;Constant support  Standing - Dynamic : Good;Constant support  Ambulation/Gait Training:   Deferred, pt had been up with OT just prior to this visit     Activity Tolerance:   Fair and requires rest breaks    After treatment patient left in no apparent distress:   Supine in bed, Call bell within reach, and Side rails x 3    COMMUNICATION/COLLABORATION:   The patients plan of care was discussed with: Occupational therapist.     Anamika Lund, PT   Time Calculation: 9 mins

## 2022-09-23 NOTE — PROGRESS NOTES
Problem: Falls - Risk of  Goal: *Absence of Falls  Description: Document Uriel Sanchez Fall Risk and appropriate interventions in the flowsheet. Outcome: Progressing Towards Goal  Note: Fall Risk Interventions:  Mobility Interventions: Communicate number of staff needed for ambulation/transfer    Mentation Interventions: Adequate sleep, hydration, pain control    Medication Interventions: Teach patient to arise slowly         History of Falls Interventions: Room close to nurse's station         Problem: Patient Education: Go to Patient Education Activity  Goal: Patient/Family Education  Outcome: Progressing Towards Goal     Problem: Pressure Injury - Risk of  Goal: *Prevention of pressure injury  Description: Document Yong Scale and appropriate interventions in the flowsheet. Outcome: Progressing Towards Goal  Note: Pressure Injury Interventions:  Sensory Interventions: Assess changes in LOC    Moisture Interventions: Absorbent underpads, Check for incontinence Q2 hours and as needed    Activity Interventions: Increase time out of bed    Mobility Interventions: HOB 30 degrees or less    Nutrition Interventions: Document food/fluid/supplement intake    Friction and Shear Interventions: Apply protective barrier, creams and emollients, HOB 30 degrees or less, Lift sheet, Transferring/repositioning devices                Problem: Patient Education: Go to Patient Education Activity  Goal: Patient/Family Education  Outcome: Progressing Towards Goal     Problem: Patient Education: Go to Patient Education Activity  Goal: Patient/Family Education  Outcome: Progressing Towards Goal     Problem: Patient Education: Go to Patient Education Activity  Goal: Patient/Family Education  Outcome: Progressing Towards Goal     Problem: Pressure Injury - Risk of  Goal: *Prevention of pressure injury  Description: Document Yong Scale and appropriate interventions in the flowsheet.   Outcome: Progressing Towards Goal  Note: Pressure Injury Interventions:  Sensory Interventions: Assess changes in LOC    Moisture Interventions: Absorbent underpads, Check for incontinence Q2 hours and as needed    Activity Interventions: Increase time out of bed    Mobility Interventions: HOB 30 degrees or less    Nutrition Interventions: Document food/fluid/supplement intake    Friction and Shear Interventions: Apply protective barrier, creams and emollients, HOB 30 degrees or less, Lift sheet, Transferring/repositioning devices                Problem: Patient Education: Go to Patient Education Activity  Goal: Patient/Family Education  Outcome: Progressing Towards Goal

## 2022-09-23 NOTE — PROGRESS NOTES
End of Shift Note    Bedside shift change report given to Sherri Epley, RN (oncoming nurse) by Zainab Vides (offgoing nurse). Report included the following information SBAR, Kardex, Intake/Output, MAR, Accordion, Recent Results, Med Rec Status, Cardiac Rhythm NSR, Sinus Tach, and Alarm Parameters     Shift worked:  [id number]     Shift summary and any significant changes:     Pain manage with Dilaudid 2 mg IV x 4 and Percocent 2 tablet x 2. Concerns for physician to address:  N/A     Zone phone for oncoming shift:   [phone]       Activity:  Activity Level: Up with Assistance  Number times ambulated in hallways past shift: 0  Number of times OOB to chair past shift: 0    Cardiac:   Cardiac Monitoring: Yes      Cardiac Rhythm: Sinus Rhythm    Access:  Current line(s): PIV     Genitourinary:   Urinary status: anuric    Respiratory:   O2 Device: None (Room air)  Chronic home O2 use?: NO  Incentive spirometer at bedside: YES       GI:  Last Bowel Movement Date: 09/17/22  Current diet:  ADULT DIET Regular; Low Potassium (Less than 3000 mg/day); Low Phosphorus (Less than 1000 mg)  Passing flatus: YES  Tolerating current diet: YES       Pain Management:   Patient states pain is manageable on current regimen: YES    Skin:  Yong Score: 20  Interventions: float heels and PT/OT consult    Patient Safety:  Fall Score:  Total Score: 2  Interventions: pt to call before getting OOB  High Fall Risk: Yes    Length of Stay:  Expected LOS: 5d 4h  Actual LOS: 4      Jaz Betts

## 2022-09-23 NOTE — PROGRESS NOTES
End of Shift Note    Bedside shift change report given to mOi  (oncoming nurse) by Melissa Muñoz LPN (offgoing nurse). Report included the following information SBAR, Kardex, and MAR    Shift worked:  7a-7p     Shift summary and any significant changes:    Patient arrived to unit A&Ox4, VSS. No complaints at this time. Concerns for physician to address: none     Zone phone for oncoming shift:  [phone]       Activity:  Activity Level: Up ad fouzia  Number times ambulated in hallways past shift: 0  Number of times OOB to chair past shift: 0    Cardiac:   Cardiac Monitoring: Yes      Cardiac Rhythm: Sinus Rhythm    Access:  Current line(s): PIV     Genitourinary:   Urinary status: anuric    Respiratory:   O2 Device: None (Room air)  Chronic home O2 use?: NO  Incentive spirometer at bedside: YES       GI:  Last Bowel Movement Date: 09/17/22  Current diet:  ADULT DIET Regular; Low Potassium (Less than 3000 mg/day); Low Phosphorus (Less than 1000 mg)  DIET ONE TIME MESSAGE  Passing flatus: YES  Tolerating current diet: YES       Pain Management:   Patient states pain is manageable on current regimen: YES    Skin:  Yong Score: 20  Interventions: increase time out of bed and nutritional support     Patient Safety:  Fall Score:  Total Score: 2  Interventions: gripper socks  High Fall Risk: Yes    Length of Stay:  Expected LOS: 5d 4h  Actual LOS: [address] [facility]

## 2022-09-24 LAB
ALBUMIN SERPL-MCNC: 2.9 G/DL (ref 3.5–5)
ANION GAP SERPL CALC-SCNC: 8 MMOL/L (ref 5–15)
BUN SERPL-MCNC: 37 MG/DL (ref 6–20)
BUN/CREAT SERPL: 5 (ref 12–20)
CALCIUM SERPL-MCNC: 8.9 MG/DL (ref 8.5–10.1)
CHLORIDE SERPL-SCNC: 94 MMOL/L (ref 97–108)
CO2 SERPL-SCNC: 32 MMOL/L (ref 21–32)
CREAT SERPL-MCNC: 8.06 MG/DL (ref 0.7–1.3)
ERYTHROCYTE [DISTWIDTH] IN BLOOD BY AUTOMATED COUNT: 15 % (ref 11.5–14.5)
GLUCOSE SERPL-MCNC: 83 MG/DL (ref 65–100)
HBV SURFACE AG SER QL: <0.1 INDEX
HBV SURFACE AG SER QL: NEGATIVE
HCT VFR BLD AUTO: 23.9 % (ref 36.6–50.3)
HGB BLD-MCNC: 7.6 G/DL (ref 12.1–17)
MCH RBC QN AUTO: 30 PG (ref 26–34)
MCHC RBC AUTO-ENTMCNC: 31.8 G/DL (ref 30–36.5)
MCV RBC AUTO: 94.5 FL (ref 80–99)
NRBC # BLD: 0 K/UL (ref 0–0.01)
NRBC BLD-RTO: 0 PER 100 WBC
PHOSPHATE SERPL-MCNC: 3.9 MG/DL (ref 2.6–4.7)
PLATELET # BLD AUTO: 162 K/UL (ref 150–400)
PMV BLD AUTO: 11 FL (ref 8.9–12.9)
POTASSIUM SERPL-SCNC: 4.1 MMOL/L (ref 3.5–5.1)
RBC # BLD AUTO: 2.53 M/UL (ref 4.1–5.7)
SODIUM SERPL-SCNC: 134 MMOL/L (ref 136–145)
WBC # BLD AUTO: 8.3 K/UL (ref 4.1–11.1)

## 2022-09-24 PROCEDURE — 65270000029 HC RM PRIVATE

## 2022-09-24 PROCEDURE — P9047 ALBUMIN (HUMAN), 25%, 50ML: HCPCS | Performed by: INTERNAL MEDICINE

## 2022-09-24 PROCEDURE — 85027 COMPLETE CBC AUTOMATED: CPT

## 2022-09-24 PROCEDURE — 74011250637 HC RX REV CODE- 250/637: Performed by: INTERNAL MEDICINE

## 2022-09-24 PROCEDURE — 74011250636 HC RX REV CODE- 250/636: Performed by: SURGERY

## 2022-09-24 PROCEDURE — 36415 COLL VENOUS BLD VENIPUNCTURE: CPT

## 2022-09-24 PROCEDURE — 80069 RENAL FUNCTION PANEL: CPT

## 2022-09-24 PROCEDURE — 74011250637 HC RX REV CODE- 250/637: Performed by: SURGERY

## 2022-09-24 PROCEDURE — 74011250636 HC RX REV CODE- 250/636: Performed by: INTERNAL MEDICINE

## 2022-09-24 PROCEDURE — 74011250637 HC RX REV CODE- 250/637: Performed by: NURSE PRACTITIONER

## 2022-09-24 RX ORDER — ASPIRIN 81 MG/1
81 TABLET ORAL DAILY
Qty: 30 TABLET | Refills: 5 | Status: SHIPPED | OUTPATIENT
Start: 2022-09-25

## 2022-09-24 RX ORDER — MIDODRINE HYDROCHLORIDE 10 MG/1
10 TABLET ORAL
Qty: 30 TABLET | Refills: 3 | Status: SHIPPED | OUTPATIENT
Start: 2022-09-24 | End: 2022-10-24

## 2022-09-24 RX ORDER — OXYCODONE AND ACETAMINOPHEN 5; 325 MG/1; MG/1
1 TABLET ORAL
Qty: 30 TABLET | Refills: 0 | Status: SHIPPED | OUTPATIENT
Start: 2022-09-24 | End: 2022-10-08

## 2022-09-24 RX ORDER — MIDODRINE HYDROCHLORIDE 5 MG/1
10 TABLET ORAL
Status: DISCONTINUED | OUTPATIENT
Start: 2022-09-24 | End: 2022-09-25 | Stop reason: HOSPADM

## 2022-09-24 RX ORDER — OXYCODONE AND ACETAMINOPHEN 5; 325 MG/1; MG/1
1-2 TABLET ORAL
Qty: 30 TABLET | Refills: 0 | Status: SHIPPED | OUTPATIENT
Start: 2022-09-24 | End: 2022-10-08

## 2022-09-24 RX ADMIN — HYDROMORPHONE HYDROCHLORIDE 2 MG: 1 INJECTION, SOLUTION INTRAMUSCULAR; INTRAVENOUS; SUBCUTANEOUS at 07:48

## 2022-09-24 RX ADMIN — SEVELAMER CARBONATE 1600 MG: 800 TABLET, FILM COATED ORAL at 21:09

## 2022-09-24 RX ADMIN — ALBUMIN (HUMAN) 25 G: 0.25 INJECTION, SOLUTION INTRAVENOUS at 08:49

## 2022-09-24 RX ADMIN — OXYCODONE AND ACETAMINOPHEN 2 TABLET: 5; 325 TABLET ORAL at 19:59

## 2022-09-24 RX ADMIN — IRON SUCROSE 200 MG: 20 INJECTION, SOLUTION INTRAVENOUS at 09:37

## 2022-09-24 RX ADMIN — Medication 1 AMPULE: at 21:09

## 2022-09-24 RX ADMIN — OXYCODONE AND ACETAMINOPHEN 2 TABLET: 5; 325 TABLET ORAL at 15:10

## 2022-09-24 RX ADMIN — HYDROMORPHONE HYDROCHLORIDE 2 MG: 1 INJECTION, SOLUTION INTRAMUSCULAR; INTRAVENOUS; SUBCUTANEOUS at 13:27

## 2022-09-24 RX ADMIN — PANTOPRAZOLE SODIUM 40 MG: 40 TABLET, DELAYED RELEASE ORAL at 15:10

## 2022-09-24 RX ADMIN — HYDROMORPHONE HYDROCHLORIDE 2 MG: 1 INJECTION, SOLUTION INTRAMUSCULAR; INTRAVENOUS; SUBCUTANEOUS at 21:09

## 2022-09-24 RX ADMIN — ERYTHROPOIETIN 20000 UNITS: 20000 INJECTION, SOLUTION INTRAVENOUS; SUBCUTANEOUS at 22:43

## 2022-09-24 RX ADMIN — CALCIUM ACETATE 1334 MG: 667 CAPSULE ORAL at 17:00

## 2022-09-24 RX ADMIN — MIDODRINE HYDROCHLORIDE 10 MG: 5 TABLET ORAL at 09:37

## 2022-09-24 RX ADMIN — HYDROMORPHONE HYDROCHLORIDE 2 MG: 1 INJECTION, SOLUTION INTRAMUSCULAR; INTRAVENOUS; SUBCUTANEOUS at 01:23

## 2022-09-24 RX ADMIN — CALCIUM ACETATE 1334 MG: 667 CAPSULE ORAL at 13:27

## 2022-09-24 RX ADMIN — SEVELAMER CARBONATE 1600 MG: 800 TABLET, FILM COATED ORAL at 15:10

## 2022-09-24 NOTE — PROGRESS NOTES
Nephrology Progress Note  [facility] / [facility]  [address], [address]  [address] [facility] [address], [address]  Phone - [phone]  Fax - [phone]                 Patient: Leelee Mcconnell                   YOB: 1975        Date- 9/24/2022                      Admit Date: 9/19/2022  CC: Follow up for esrd          IMPRESSION & PLAN:   ESRD - home hd- f/b Dr Gwendolyn Way nephrologist  Left leg critical limb ischemic - left common femoral to anterior tibial artery bypass with cadaver vein (9/19/22). PVD  Hyponatremia  anemia of ckd  Pl. Effusion  Hypertension  Sec. Hyperpara  H/o gastric ca  H/o GI bleed    PLAN-  Plan for dialysis today  Continue hd TTS schedule  Continue calcitriol  Continue epogen  Continue phoslo, renvela     Subjective: Interval History:   -  Seen on HD today  - NO issues overnight  - Appreciated vascular input. Objective:   Vitals:    09/23/22 1933 09/23/22 2334 09/24/22 0415 09/24/22 0752   BP: 127/72 (!) 117/59 (!) 127/46 (!) 149/42   Pulse: 93 85 84 90   Resp: 16 17 17 17   Temp: 99 °F (37.2 °C) 98.9 °F (37.2 °C) 98.4 °F (36.9 °C) 98.7 °F (37.1 °C)   TempSrc:       SpO2: 96% 94% 95% 97%   Weight:       Height:          09/23 0701 - 09/24 0700  In: 200 [P.O.:200]  Out: -   Last 3 Recorded Weights in this Encounter    09/19/22 1346 09/21/22 0630 09/23/22 0321   Weight: 57.4 kg (126 lb 8.7 oz) 61.7 kg (136 lb 1.6 oz) 55.8 kg (123 lb)        Physical exam:    GEN:  NAD  NECK:  Supple, no thyromegaly  RESP: Clear  b/l, no  wheezing,   CVS: RRR,S1,S2   NEURO: non focal, normal speech  EXT:upper arm avg +, left leg staples +  PSYCH: Normal Mood    Chart reviewed. Pertinent Notes reviewed.      Data Review :  Recent Labs     09/23/22  0007 09/22/22  0832   * 133*   K 3.7 4.0   CL 96* 94*   CO2 31 31   BUN 23* 43*   CREA 5.28* 8.94*   * 84   CA 8.7 8.5   PHOS 3.4 4.5       Recent Labs     09/24/22  0830 09/23/22  0007   WBC 8.3 7.8   HGB 7.6* 8.3*   HCT 23.9* 25.4*    154       No results for input(s): FE, TIBC, PSAT, FERR in the last 72 hours.    Lab Results   Component Value Date/Time    Hemoglobin A1c 3.8 (L) 05/22/2012 05:00 PM        No results found for: MCACR, MCA1, MCA2, MCA3, MCAU, MCAU2, MCALPOCT  Lab Results   Component Value Date/Time    BNP 1,946 (H) 09/16/2014 04:17 AM     (H) 02/07/2012 03:00 AM    NT pro- (H) 09/06/2009 04:25 AM     US Results (most recent):  Medication list  reviewed  Current Facility-Administered Medications   Medication Dose Route Frequency    midodrine (PROAMATINE) tablet 10 mg  10 mg Oral DIALYSIS ONCE    iron sucrose (VENOFER) injection 200 mg  200 mg IntraVENous ONCE    HYDROmorphone (DILAUDID) injection 2 mg  2 mg IntraVENous Q3H PRN    alcohol 62% (NOZIN) nasal  1 Ampule  1 Ampule Topical Q12H    oxyCODONE-acetaminophen (PERCOCET) 5-325 mg per tablet 1-2 Tablet  1-2 Tablet Oral Q4H PRN    epoetin emilie (EPOGEN;PROCRIT) injection 20,000 Units  20,000 Units SubCUTAneous Q TUE, THU & SAT    albumin human 25% (BUMINATE) solution 25 g  25 g IntraVENous DIALYSIS PRN    aspirin delayed-release tablet 81 mg  81 mg Oral DAILY    atorvastatin (LIPITOR) tablet 20 mg  20 mg Oral DAILY    calcitRIOL (ROCALTROL) capsule 0.5 mcg  0.5 mcg Oral DAILY    calcium acetate(phosphat bind) (PHOSLO) capsule 1,334 mg  2 Capsule Oral TID WITH MEALS    metoprolol succinate (TOPROL-XL) XL tablet 12.5 mg  12.5 mg Oral DAILY    pantoprazole (PROTONIX) tablet 40 mg  40 mg Oral ACB&D    sevelamer carbonate (RENVELA) tab 1,600 mg  1,600 mg Oral TID    bisacodyL (DULCOLAX) suppository 10 mg  10 mg Rectal DAILY PRN    ondansetron (ZOFRAN) injection 4 mg  4 mg IntraVENous Q6H PRN    acetaminophen (TYLENOL) tablet 650 mg  650 mg Oral Q4H PRN        Izabel Martin MD  9/24/2022

## 2022-09-24 NOTE — PROGRESS NOTES
S/p Left fem to at bypass  +graft pulse  Hypotension from dialysis resolved  Doing well  D/c to rehab  BILL completed  Pain script signed and on chart

## 2022-09-24 NOTE — PROGRESS NOTES
TRANSFER - OUT REPORT:    Verbal report given to  Fazal Barros RN(name) on Te Lay  being transferred to [facility](Unit) for ordered procedure       Report consisted of patients Situation, Background, Assessment and   Recommendations(SBAR). Information from the following report(s) Kardex was reviewed with the receiving nurse. Opportunity for questions and clarification was provided.       Patient transported with:   [employer]

## 2022-09-24 NOTE — PROGRESS NOTES
Received pt from Dialysis; pt offers no complaints. 58 - MD at bedside stating to discharge pt to [facility]. Informed him of BILL. BILL completed. Spoke with Josephine Devi; she is setting up transport.    [address] to Inpatient [address]                                                                        42 y.o.   male    [address]   Room: [facility]/    MRM 3 SURG TELE  Unit Phone# :  [address]   [place] [address]  Dept: [phone]  Loc: [phone]                    SITUATION     Admitted:  9/19/2022         Attending Provider:  Checo Gregory,*       Consultations:  IP CONSULT TO NEPHROLOGY    PCP:  Karen Pimentel MD   [phone]    Treatment Team: Attending Provider: Checo Gregory MD; Consulting Provider: Pennie Bryant MD; Primary Nurse: Josephine Devi Primary Nurse: Nellie Pace RN; Utilization Review: Jeffrey Reveles RN; Primary Nurse: Gracia Massey RN; Care Manager: Jaziel Banerjee    Admitting Dx:  Critical lower limb ischemia [facility]) [I70.229]       Principal Problem: <principal problem not specified>    5 Days Post-Op of   Procedure(s):  LEFT FEMORALTO ANTERIOR -TIBIAL BYPASS WITH CADAVER VEIN   BY: Checo Gregory MD             ON: 9/19/2022                  Code Status: Full Code                Advance Directives:   Advance Care Planning 9/20/2022   Patient's Healthcare Decision Maker is: -   Primary Decision Maker Name -   Primary Decision Maker Phone Number -   Primary Decision Maker Relationship to Patient -   Confirm Advance Directive None   Patient Would Like to Complete Advance Directive No    (Send w/patient)   No Doesnt Have       Isolation:  There are currently no Active Isolations       MDRO: No current active infections    Pain Medications given:  Dilaudid    Last dose: 9/24/2022 at  [address] needed: no  Type of equipment:    hemodialysis  (Not currently on dialysis)  (Not currently on dialysis)  (Not currently on dialysis)     BACKGROUND     Allergies: Allergies   Allergen Reactions    Shellfish Containing Products Swelling     Break out in rash, trouble breathing    Contrast Agent [Iodine] Shortness of Breath    Heparin Analogues Other (comments)     Positive history of HIT       Past Medical History:   Diagnosis Date    Abdominal hematoma     AICD (automatic cardioverter/defibrillator) present     CAD (coronary artery disease)     anterior MI s/p 2 stents  2007    Chronic abdominal pain     Chronic kidney disease     ESRD; Dialysis dependent.  Live [address] home x5 days week M-F does labs    DVT (deep venous thrombosis) ([address] 2001    DVT of popliteal vein ([address] 11/2011    not anticoagulated due to bleeding, IVC filter    Endocarditis     Gallstone pancreatitis     Gastrointestinal disorder     acid reflux    Gastrointestinal disorder     peptic ulcer    Hemodialysis patient ([address]     High cholesterol     HIT (heparin-induced thrombocytopenia) ([address]     Hypertension     Kidney transplant     b/l kidneys 1995, 2001    Long term current use of anticoagulant therapy     Nephrotic syndrome     Other ill-defined conditions(799.89)     kidny transplant x2,  on dialysis    Other ill-defined conditions(799.89)     home dialysis    Other ill-defined conditions(799.89)     hx recurrent left leg DVT    Peritonitis ([address]     Seizures ([address] 2015    most recent- only had three in lifetime    Small bowel obstruction (HCC)     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    V-tach [facility])        Past Surgical History:   Procedure Laterality Date    HX APPENDECTOMY      HX CHOLECYSTECTOMY      HX OTHER SURGICAL  11/2011    exploratory laparotomy    HX OTHER SURGICAL      fem-pop    HX OTHER SURGICAL  02/2013    right upper extremity fistula    HX PACEMAKER Left 6/2009    AICD-st latonya-not connected    HX PACEMAKER Left     AICD-left side-BS-working    HX SMALL BOWEL RESECTION      HX TRANSPLANT  2001     Kidney    HX TRANSPLANT  1992    kidney    HX VASCULAR ACCESS Right     femoral access for HD- does 5 day dialysis @ home/left upper arm graft active, right upper arm occluded    IR INSERT NON TUNL CVC OVER 5 YRS  12/7/2021    IR INSERT NON TUNL CVC OVER 5 YRS  12/10/2021    IR INSERT TIPS HEPATIC SHUNT  12/30/2021    IR REMOVE TUNL CVAD W/O PORT / PUMP  10/29/2020    IR REPLACE CVC TUNNELED W/O PORT  9/10/2020    OH CARDIAC SURG PROCEDURE UNLIST  2007    2 stents    OH EDG US EXAM SURGICAL ALTER STOM DUODENUM/JEJUNUM  7/15/2011         OH ESOPHAGOGASTRODUODENOSCOPY TRANSORAL DIAGNOSTIC  5/24/2012         UPPER GI ENDOSCOPY,CTRL BLEED  12/6/2021         UPPER GI ENDOSCOPY,DIAGNOSIS  12/8/2021         UPPER GI ENDOSCOPY,DIAGNOSIS  4/20/2022         VASCULAR SURGERY PROCEDURE UNLIST  11/14/2017    Insertion AV graft right upper arm (bovine); ligation of AV fistula right arm       Medications Prior to Admission   Medication Sig    sevelamer carbonate (RENVELA) 800 mg tab tab Take 1,600 mg by mouth three (3) times daily. metoprolol succinate (TOPROL-XL) 25 mg XL tablet Take 12.5 mg by mouth daily. pantoprazole (PROTONIX) 40 mg tablet Take 1 Tablet by mouth Before breakfast and dinner. propranoloL (INDERAL) 10 mg tablet Take 10 mg by mouth two (2) times a day. Vitamin D2 1,250 mcg (50,000 unit) capsule Take 50,000 Units by mouth every seven (7) days. calcium acetate,phosphat bind, (PHOSLO) 667 mg cap Take 2 Caps by mouth three (3) times daily (with meals). Indications: low amount of calcium in the blood, renal osteodystrophy with hyperphosphatemia    atorvastatin (LIPITOR) 20 mg tablet Take 20 mg by mouth daily. calcitRIOL (ROCALTROL) 0.5 mcg capsule Take 0.5 mcg by mouth daily.     ondansetron (ZOFRAN ODT) 4 mg disintegrating tablet Take 1 Tablet by mouth every eight (8) hours as needed for Nausea or Vomiting. (Patient not taking: Reported on 9/19/2022)    ondansetron hcl (Zofran) 4 mg tablet Take 1 Tablet by mouth every twelve (12) hours as needed for Nausea or Vomiting. (Patient not taking: Reported on 9/19/2022)    dicyclomine (BENTYL) 20 mg tablet Take 1 Tablet by mouth every six (6) hours as needed for Abdominal Cramps. (Patient not taking: Reported on 9/19/2022)    sucralfate (CARAFATE) 1 gram tablet Take 1 Tablet by mouth Before breakfast, lunch, dinner and at bedtime. (Patient not taking: Reported on 9/19/2022)    polyethylene glycol (MIRALAX) 17 gram packet Take 1 Packet by mouth daily. (Patient not taking: Reported on 9/19/2022)    acetaminophen (TYLENOL) 500 mg tablet Take 1 Tab by mouth every four (4) hours as needed for Pain. Over the counter       Hard scripts included in transfer packet yes    Vaccinations:    Immunization History   Administered Date(s) Administered    Influenza Vaccine (Madin Genoveva Canine Kidney) PF 09/16/2014    Influenza Vaccine Whole 10/01/2011    Pneumococcal Vaccine (Unspecified Type) 03/01/2012               The Charlson CoMorbitiy Index tool is an evidenced based tool that has more automatic generated information. The tool looks at many different items such as the age of the patient, how many times they were admitted in the last calendar year, current length of stay in the hospital and their diagnosis. All of these items are pulled automatically from information documented in the chart from various places and will generate a score that predicts whether a patient is at low (less than 13), medium (13-20) or high (21 or greater) risk of being readmitted.         ASSESSMENT                Temp: 98.2 °F (36.8 °C) (09/24/22 1300) Pulse (Heart Rate): 82 (09/24/22 1300)     Resp Rate: 18 (09/24/22 1300)           BP: (!) 100/42 (09/24/22 1300)     O2 Sat (%): 96 % (09/24/22 1300)     Weight: 55.8 kg (123 lb)    Height: 5' 7\" (170.2 cm) (09/23/22 0321)      Active Orders   Diet    ADULT DIET Regular; Low Potassium (Less than 3000 mg/day); Low Phosphorus (Less than 1000 mg)         Orientation: oriented to time, place, person and situation     Active Behaviors: None                                   Active Lines/Drains:  (Peg Tube / Molina / CL or S/L?): no    Urinary Status: Anuria     Last BM: Last Bowel Movement Date: 09/23/22     Skin Integrity: Incision (comment)             Mobility: Slightly limited   Weight Bearing Status: WBAT (Weight Bearing as Tolerated)      Gait Training  Assistive Device: Walker, rolling, Gait belt  Ambulation - Level of Assistance: Contact guard assistance  Distance (ft): 25 Feet (ft)         Lab Results   Component Value Date/Time    Glucose 83 09/24/2022 08:30 AM    Hemoglobin A1c 3.8 (L) 05/22/2012 05:00 PM    INR 1.2 (H) 09/19/2022 03:27 PM    INR 1.2 (H) 12/26/2021 02:12 AM    HGB 7.6 (L) 09/24/2022 08:30 AM    HGB 8.3 (L) 09/23/2022 12:07 AM        RECOMMENDATION     See After Visit Summary (AVS) for:  Discharge instructions  After [address] equipment needed (entered pre-discharge by Care Management)  Medication Reconciliation    Follow up Appointment(s)         Report given/sent by:  Babatunde Ascencio RN                    Verbal report given David Cabezas RN         Estimated discharge time:  9/24/2022 at 1600. [facility] called and updated time: 2215. 1915 - Bedside and Verbal shift change report given to Shae Mcintyre RN (oncoming nurse) by Clari Evans RN (offgoing nurse). Report included the following information SBAR, Kardex, Intake/Output, MAR, and Recent Results.

## 2022-09-24 NOTE — PROCEDURES
Hemodialysis / [phone]    Vitals Pre Post Assessment Pre Post   BP BP: (!) 79/31 (09/24/22 0945)   103/43 LOC A & o x 4 A & O x 4   HR Pulse (Heart Rate): 75 (09/24/22 0945) 84 Lungs clear Clear   Resp Resp Rate: 16 (09/24/22 0945) 14 Cardiac S1S2 S1S2   Temp Temp: 98.5 °F (36.9 °C) (09/24/22 0815) 98.5 oral Skin Warm dry & sutures to Left leg Warm dry & sutures to Left leg   Weight  N/a N/a Edema Facial  Facial    Tele status yes yes Pain Pain Intensity 1: 5 (09/24/22 0840) 4     Orders   Duration: Start: 0815 End: 1145 Total: 3.5   Dialyzer: Dialyzer/Set Up Inspection: Miladys Ham (09/24/22 0815)   K Bath: Dialysate K (mEq/L): 2 (09/24/22 0815)   Ca Bath: Dialysate CA (mEq/L): 2.5 (09/24/22 0815)   Na: Dialysate NA (mEq/L): 138 (09/24/22 0815)   Bicarb: Dialysate HCO3 (mEq/L): 40 (09/24/22 0815)   Target Fluid Removal: Goal/Amount of Fluid to Remove (mL): 3000 mL (09/24/22 0815)     Access  AVG   Type & Location: Left upper AVG   Comments:            + T/B pre & post HD. Cannulated x 2.  Blood flow 400                            Labs   HBsAg (Antigen) / date:    Neg 09/23/22                                           HBsAb (Antibody) / date: Immune 09/23/22   Source: Epic   Obtained/Reviewed  Critical Results Called HGB   Date Value Ref Range Status   09/24/2022 7.6 (L) 12.1 - 17.0 g/dL Final     Potassium   Date Value Ref Range Status   09/24/2022 4.1 3.5 - 5.1 mmol/L Final     Calcium   Date Value Ref Range Status   09/24/2022 8.9 8.5 - 10.1 MG/DL Final     BUN   Date Value Ref Range Status   09/24/2022 37 (H) 6 - 20 MG/DL Final     Creatinine   Date Value Ref Range Status   09/24/2022 8.06 (H) 0.70 - 1.30 MG/DL Final     Comment:     INVESTIGATED PER DELTA CHECK PROTOCOL        Meds Given   Name Dose Route   Albumin 25G IV   Midodrine 10mg PO   Venover 200mg IV     Adequacy / Fluid    Total Liters Process: 80.3   Net Fluid Removed: 2000ml      Comments   Time Out Done:   (Time) 1804   Admitting Diagnosis: ESRD - Left leg critical limb ischemic - left common femoral to anterior tibial artery bypass with cadaver vein (9/19/22). PVD  Hyponatremia  anemia of ckd  Pl. Effusion  Hypertension  Sec. Hyperpara  H/o gastric ca  H/o GI bleed   Consent obtained/signed: Informed Consent Verified: Yes (chronic pt. consent on file) (09/24/22 0815)   Machine / RO # Machine Number: [id number] (09/24/22 0815)   Primary Nurse Rpt Pre: Yuniel Aguiar   Primary Nurse Rpt Post: Richard Olvera   Pt Education: Access care   Care Plan: Continue HD   Pts outpatient clinic: home hd- f/b Dr Gilford Skene nephrologist     Tx Summary SUZANNE AVG: skin CDI. No s/s of infection. + B/T. No issues with cannulation or hemostasis. Running well at . Pt arrived to HD suite A&Ox4. Consent signed & on file. SBAR received from Primary RN. 0815: Pt cannulated with 08D needles per policy & without issue. Labs drawn per request/ order. VSS. Dialysis Tx initiated. 0845: Pt resting quietly. Dr. Delia mcintosh, goal down/ordered Midodrine & Albumin 25G.  0915: Pt. Stable,lines secure and visible  0945: pt. Stable, lines secure and visible. Pt. asymtomatic  1015: pt. Stable, lines secure and visible  1045: pt. Stable, lines secure and visble  1115: pt. Stable, no s/s of distress  1145 Tx ended. VSS. All possible blood returned to patient. Hemostasis achieved without issue. Bed locked and in the lowest position, call bell and belongings in reach. SBAR given to Primary, RN. Patient is stable at time of their/ my departure. All Dialysis related medications have been reviewed. Comments:   RN reviewed LPN assessment and completed RN assessment. RN completed patient assessment. RN reviewed technicians vital signs and procedure note. Tx completed. Reviewed by MANUELITO Yip

## 2022-09-24 NOTE — PROGRESS NOTES
Problem: Falls - Risk of  Goal: *Absence of Falls  Description: Document Sylvester Peters Fall Risk and appropriate interventions in the flowsheet. Outcome: Resolved/Met     Problem: Patient Education: Go to Patient Education Activity  Goal: Patient/Family Education  Outcome: Resolved/Met     Problem: Pressure Injury - Risk of  Goal: *Prevention of pressure injury  Description: Document Yong Scale and appropriate interventions in the flowsheet. Outcome: Resolved/Met     Problem: Patient Education: Go to Patient Education Activity  Goal: Patient/Family Education  Outcome: Resolved/Met     Problem: Pressure Injury - Risk of  Goal: *Prevention of pressure injury  Description: Document Yong Scale and appropriate interventions in the flowsheet.   Outcome: Resolved/Met     Problem: Patient Education: Go to Patient Education Activity  Goal: Patient/Family Education  Outcome: Resolved/Met     Problem: Patient Education: Go to Patient Education Activity  Goal: Patient/Family Education  Outcome: Resolved/Met

## 2022-09-24 NOTE — DISCHARGE INSTRUCTIONS
Patient Discharge Instructions    Rachelle Carlson / [id number] : 1975    Admitted 2022 Discharged: 2022     Take Home Medications     {Medication reconciliation information is now added to the patient's AVS automatically when it is printed. There is no need to use this SmartLink in discharge instructions. Highlight this text and delete it to clear this message}       It is important that you take the medication exactly as they are prescribed. Keep your medication in the bottles provided by the pharmacist and keep a list of the medication names, dosages, and times to be taken in your wallet. Do not take other medications without consulting your doctor. What to do at [address] [place] [address] [place]: Dry dressing to incisions daily    Recommended diet: AHA    Recommended activity: As Tolerated. No Strenuous activity or heavy lifting    If you experience any of the following symptoms ***, please follow up with ***. Follow-up with Dr Angle Hernandez in 2 weeks [phone]        Information obtained by :  I understand that if any problems occur once I am at home I am to contact my physician. I understand and acknowledge receipt of the instructions indicated above.                                                                                                                                            Physician's or R.N.'s Signature                                                                  Date/Time                                                                                                                                              Patient or Representative Signature                                                          Date/Time

## 2022-09-24 NOTE — PROGRESS NOTES
Problem: Falls - Risk of  Goal: *Absence of Falls  Description: Document Juli Buchanan Fall Risk and appropriate interventions in the flowsheet. Outcome: Progressing Towards Goal  Note: Fall Risk Interventions:  Mobility Interventions: Communicate number of staff needed for ambulation/transfer    Mentation Interventions: Adequate sleep, hydration, pain control    Medication Interventions: Teach patient to arise slowly, Patient to call before getting OOB         History of Falls Interventions: Room close to nurse's station         Problem: Patient Education: Go to Patient Education Activity  Goal: Patient/Family Education  Outcome: Progressing Towards Goal     Problem: Pressure Injury - Risk of  Goal: *Prevention of pressure injury  Description: Document Yong Scale and appropriate interventions in the flowsheet. Outcome: Progressing Towards Goal  Note: Pressure Injury Interventions:  Sensory Interventions: Assess changes in LOC    Moisture Interventions: Absorbent underpads, Check for incontinence Q2 hours and as needed    Activity Interventions: Increase time out of bed    Mobility Interventions: Float heels    Nutrition Interventions: Document food/fluid/supplement intake    Friction and Shear Interventions: Apply protective barrier, creams and emollients, HOB 30 degrees or less, Lift sheet, Transferring/repositioning devices                Problem: Patient Education: Go to Patient Education Activity  Goal: Patient/Family Education  Outcome: Progressing Towards Goal     Problem: Pressure Injury - Risk of  Goal: *Prevention of pressure injury  Description: Document Yong Scale and appropriate interventions in the flowsheet.   Outcome: Progressing Towards Goal  Note: Pressure Injury Interventions:  Sensory Interventions: Assess changes in LOC    Moisture Interventions: Absorbent underpads, Check for incontinence Q2 hours and as needed    Activity Interventions: Increase time out of bed    Mobility Interventions: Float heels    Nutrition Interventions: Document food/fluid/supplement intake    Friction and Shear Interventions: Apply protective barrier, creams and emollients, HOB 30 degrees or less, Lift sheet, Transferring/repositioning devices                Problem: Patient Education: Go to Patient Education Activity  Goal: Patient/Family Education  Outcome: Progressing Towards Goal     Problem: Patient Education: Go to Patient Education Activity  Goal: Patient/Family Education  Outcome: Progressing Towards Goal     Problem: Patient Education: Go to Patient Education Activity  Goal: Patient/Family Education  Outcome: Progressing Towards Goal

## 2022-09-24 NOTE — PROGRESS NOTES
Pt is cleared for d/c from a CM standpoint. Transition of Care Plan:     RUR: 22%  Disposition:[facility]   Follow up appointments: To be done by rehab facility when discharged   DME needed:None   Transportation at [facility] or means to access home:  Sister has keys  IM Medicare Letter:  provided   Is patient a  and connected with the [place]? No         If yes, was [place] transfer form completed and VA notified? N/A  Caregiver Contact:Sister   Discharge Caregiver contacted prior to discharge? Caregiver to be contacted prior to discharge. Care Conference needed?:    No    2:20 p.m. [facility] will transport at 4 p.m.    CM received message that Pamella of [facility] can accept pt today. Pt will go into room 108. Nursing can call report to [phone]. The accepting physician is Dr. Clauida Vazquez. CM met with pt at bedside to discuss. 2IM reviewed, signed and placed on bedside chart. PCS on bedside chart. Care Management Interventions  PCP Verified by CM:  Yes (Vic Grubbs MD)  Palliative Care Criteria Met (RRAT>21 & CHF Dx)?: No (No MD order for Palliative Care)  Mode of Transport at Discharge: BLS  Transition of Care Consult (CM Consult): Acute [address]: No  Reason Outside [place]: Out of service area  Discharge Durable Medical Equipment: No  Physical Therapy Consult: Yes  Occupational Therapy Consult: Yes  Speech Therapy Consult: No  Support Systems: Other Family Member(s)  Confirm Follow Up Transport: Family  The Patient and/or Patient Representative was Provided with a Choice of Provider and Agrees with the Discharge Plan?: Yes  Freedom of Choice List was Provided with Basic Dialogue that Supports the Patient's Individualized Plan of Care/Goals, Treatment Preferences and Shares the Quality Data Associated with the Providers?: Yes   Resource Information Provided?: No  Discharge Location  Patient Expects to be Discharged to[de-identified] Rehab hospital/unit acute    Herbert Webb, MSW  Care Management, [place] [address]

## 2022-09-25 VITALS
BODY MASS INDEX: 19.24 KG/M2 | RESPIRATION RATE: 16 BRPM | DIASTOLIC BLOOD PRESSURE: 73 MMHG | TEMPERATURE: 98 F | HEART RATE: 81 BPM | HEIGHT: 67 IN | SYSTOLIC BLOOD PRESSURE: 113 MMHG | WEIGHT: 122.6 LBS | OXYGEN SATURATION: 97 %

## 2022-09-25 LAB
ALBUMIN SERPL-MCNC: 3.1 G/DL (ref 3.5–5)
ANION GAP SERPL CALC-SCNC: 8 MMOL/L (ref 5–15)
BASOPHILS # BLD: 0.1 K/UL (ref 0–0.1)
BASOPHILS NFR BLD: 1 % (ref 0–1)
BUN SERPL-MCNC: 18 MG/DL (ref 6–20)
BUN/CREAT SERPL: 4 (ref 12–20)
CALCIUM SERPL-MCNC: 9.1 MG/DL (ref 8.5–10.1)
CHLORIDE SERPL-SCNC: 97 MMOL/L (ref 97–108)
CO2 SERPL-SCNC: 31 MMOL/L (ref 21–32)
CREAT SERPL-MCNC: 4.91 MG/DL (ref 0.7–1.3)
DIFFERENTIAL METHOD BLD: ABNORMAL
EOSINOPHIL # BLD: 0.4 K/UL (ref 0–0.4)
EOSINOPHIL NFR BLD: 5 % (ref 0–7)
ERYTHROCYTE [DISTWIDTH] IN BLOOD BY AUTOMATED COUNT: 15.3 % (ref 11.5–14.5)
GLUCOSE SERPL-MCNC: 84 MG/DL (ref 65–100)
HCT VFR BLD AUTO: 25 % (ref 36.6–50.3)
HGB BLD-MCNC: 7.8 G/DL (ref 12.1–17)
IMM GRANULOCYTES # BLD AUTO: 0 K/UL (ref 0–0.04)
IMM GRANULOCYTES NFR BLD AUTO: 0 % (ref 0–0.5)
LYMPHOCYTES # BLD: 1.4 K/UL (ref 0.8–3.5)
LYMPHOCYTES NFR BLD: 18 % (ref 12–49)
MCH RBC QN AUTO: 29.9 PG (ref 26–34)
MCHC RBC AUTO-ENTMCNC: 31.2 G/DL (ref 30–36.5)
MCV RBC AUTO: 95.8 FL (ref 80–99)
MONOCYTES # BLD: 1.3 K/UL (ref 0–1)
MONOCYTES NFR BLD: 17 % (ref 5–13)
NEUTS SEG # BLD: 4.6 K/UL (ref 1.8–8)
NEUTS SEG NFR BLD: 59 % (ref 32–75)
NRBC # BLD: 0 K/UL (ref 0–0.01)
NRBC BLD-RTO: 0 PER 100 WBC
PHOSPHATE SERPL-MCNC: 3 MG/DL (ref 2.6–4.7)
PLATELET # BLD AUTO: 172 K/UL (ref 150–400)
PMV BLD AUTO: 10.8 FL (ref 8.9–12.9)
POTASSIUM SERPL-SCNC: 3.8 MMOL/L (ref 3.5–5.1)
RBC # BLD AUTO: 2.61 M/UL (ref 4.1–5.7)
SODIUM SERPL-SCNC: 136 MMOL/L (ref 136–145)
WBC # BLD AUTO: 7.8 K/UL (ref 4.1–11.1)

## 2022-09-25 PROCEDURE — 36415 COLL VENOUS BLD VENIPUNCTURE: CPT

## 2022-09-25 PROCEDURE — 85025 COMPLETE CBC W/AUTO DIFF WBC: CPT

## 2022-09-25 PROCEDURE — 80069 RENAL FUNCTION PANEL: CPT

## 2022-09-25 PROCEDURE — 74011250636 HC RX REV CODE- 250/636: Performed by: SURGERY

## 2022-09-25 PROCEDURE — 74011250637 HC RX REV CODE- 250/637: Performed by: NURSE PRACTITIONER

## 2022-09-25 PROCEDURE — 74011250637 HC RX REV CODE- 250/637: Performed by: SURGERY

## 2022-09-25 RX ADMIN — OXYCODONE AND ACETAMINOPHEN 2 TABLET: 5; 325 TABLET ORAL at 04:25

## 2022-09-25 RX ADMIN — ASPIRIN 81 MG: 81 TABLET, COATED ORAL at 09:06

## 2022-09-25 RX ADMIN — SEVELAMER CARBONATE 1600 MG: 800 TABLET, FILM COATED ORAL at 09:06

## 2022-09-25 RX ADMIN — ONDANSETRON 4 MG: 2 INJECTION INTRAMUSCULAR; INTRAVENOUS at 06:15

## 2022-09-25 RX ADMIN — OXYCODONE AND ACETAMINOPHEN 2 TABLET: 5; 325 TABLET ORAL at 09:07

## 2022-09-25 RX ADMIN — HYDROMORPHONE HYDROCHLORIDE 2 MG: 1 INJECTION, SOLUTION INTRAMUSCULAR; INTRAVENOUS; SUBCUTANEOUS at 02:02

## 2022-09-25 RX ADMIN — CALCIUM ACETATE 1334 MG: 667 CAPSULE ORAL at 09:07

## 2022-09-25 RX ADMIN — HYDROMORPHONE HYDROCHLORIDE 2 MG: 1 INJECTION, SOLUTION INTRAMUSCULAR; INTRAVENOUS; SUBCUTANEOUS at 06:14

## 2022-09-25 RX ADMIN — PANTOPRAZOLE SODIUM 40 MG: 40 TABLET, DELAYED RELEASE ORAL at 09:07

## 2022-09-25 RX ADMIN — METOPROLOL SUCCINATE 12.5 MG: 25 TABLET, EXTENDED RELEASE ORAL at 09:07

## 2022-09-25 RX ADMIN — CALCITRIOL CAPSULES 0.25 MCG 0.5 MCG: 0.25 CAPSULE ORAL at 09:07

## 2022-09-25 RX ADMIN — OXYCODONE AND ACETAMINOPHEN 2 TABLET: 5; 325 TABLET ORAL at 00:36

## 2022-09-25 RX ADMIN — Medication 1 AMPULE: at 09:07

## 2022-09-25 RX ADMIN — ATORVASTATIN CALCIUM 20 MG: 20 TABLET, FILM COATED ORAL at 09:07

## 2022-09-25 NOTE — PROGRESS NOTES
Transition of Care Plan:     RUR: 22%  Disposition:[facility]  Follow up appointments: To be done by rehab facility when discharged   DME needed:None   Transportation at [facility] or means to access home:  Sister has keys  IM Medicare Letter:  provided   Is patient a [place] and connected with the [place]? No         If yes, was [place] transfer form completed and VA notified? N/A  Caregiver Contact:Sister   Discharge Caregiver contacted prior to discharge? Caregiver to be contacted prior to discharge. Care Conference needed?:    No     9:26AM UPDATE  [facility] present to transport pt this AM. Per chart review, pt was scheduled to d/c to [facility] yesterday at 4:00PM. Informed by RN that d/c was delayed through evening shift and delayed until this AM. CM spoke with Pamella @ [facility] ([phone]) who confirmed that they are still able to accept pt. Pt will go into room 108. Nursing can call report to [phone]. The accepting physician is Dr. Marino Pinto. Pt has no additional CM needs at this time. Care Management Interventions  PCP Verified by CM:  Yes (Marbella Anaya MD)  Palliative Care Criteria Met (RRAT>21 & CHF Dx)?: No (No MD order for Palliative Care)  Mode of Transport at Discharge: BLS  Transition of Care Consult (CM Consult): Acute [address]: No  Reason Outside [place]: Out of service area  Discharge Durable Medical Equipment: No  Physical Therapy Consult: Yes  Occupational Therapy Consult: Yes  Speech Therapy Consult: No  Support Systems: Other Family Member(s)  Confirm Follow Up Transport: Family  The Patient and/or Patient Representative was Provided with a Choice of Provider and Agrees with the Discharge Plan?: Yes  Freedom of Choice List was Provided with Basic Dialogue that Supports the Patient's Individualized Plan of Care/Goals, Treatment Preferences and Shares the Quality Data Associated with the Providers?: Yes  The [employer] Information Provided?: No  Discharge Location  Patient Expects to be Discharged to[de-identified] Rehab hospital/unit acute    Fernando Gibson  Manager  [phone]

## 2022-09-25 NOTE — PROGRESS NOTES
Problem: Falls - Risk of  Goal: *Absence of Falls  Description: Document Ilana Herrera Fall Risk and appropriate interventions in the flowsheet.   Outcome: Resolved/Met     Problem: Patient Education: Go to Patient Education Activity  Goal: Patient/Family Education  Outcome: Resolved/Met

## 2022-09-25 NOTE — PROGRESS NOTES
[facility] transport at bedside, preparing to take pt to [facility]. Attempted to call report x2 without success; no one answers the phone. Notified Jesus Franz CM.   3992 - [facility] transport has medication script and paperwork. [facility] transported with pt's belongings. Attempted to call report again without success; no one answers phone. CM confirms that they are aware that they are receiving pt.

## 2022-09-25 NOTE — PROGRESS NOTES
End of Shift Note    Bedside shift change report given to Daisy Vaughn RN(oncoming nurse) by Violet Mcleod RN (offgoing nurse). Report included the following information     Shift worked:  7pm-7am     Shift summary and any significant changes:     Patient complained of left groin and leg pain throughout the shift, pain medication was administered. [facility] is scheduled to arrive around 915am this morning. Patient dressing on groin was changed, tolerated well.       Concerns for physician to address:  None         Length of Stay:  Expected LOS: 5d 4h  Actual LOS: 6      Violet Mcleod RN

## 2022-10-04 NOTE — DISCHARGE SUMMARY
Vascular Surgery Discharge Summary     Patient ID:  Lb Coker  [id number]  68 y.o.  1975    Admitting Provider: Freddy Matute MD  Discharging Provider: Freddy Matute MD    Admit Date: 9/19/2022    Discharge Date: 9/24/2022    Discharge Diagnoses:    Left critical limb threatening ischemia    Procedures during admission:  Procedure(s):  LEFT [address] Course:   40 yo male s/p left femoral to anterior tibial bypass. Postoperative course with complication. Received dialysis as needed. He was discharge to rehab. Pertinent Results:   No results found for this or any previous visit (from the past 24 hour(s)). Vital signs: No data found. Patient Weight:   Last 3 Recorded Weights in this Encounter    09/21/22 0630 09/23/22 0321 09/25/22 0609   Weight: 61.7 kg (136 lb 1.6 oz) 55.8 kg (123 lb) 55.6 kg (122 lb 9.6 oz)       Consults: Nephrology    Patient Condition at Discharge: good    Disposition: home    Patient Instructions:   Cannot display discharge medications since this patient is not currently admitted. Diet: Regular Diet    Discharge instructions:     Follow-up Information       Follow up With Specialties Details Why Contact Info    Waqar Harrington MD Family Medicine   [facility]  [phone]              Signed:  Manolo Macias MD  10/4/2022  10:13 AM

## 2022-10-27 NOTE — PROGRESS NOTES
Follow-up Spiritual Care assessment visit for Mr. Tomás Merlos in [phone]. Reviewed the chart before visit. Due to medical concern, the visit was made via phone call. Pt was alert and oriented. According to him, he was told that he might be discharged tomorrow and transferred to Rehab.  celebrated his progress, wished continued healing and recovery. Family is one of his main coping resources,  affirmed his strong support. He denied any further spiritual needs. Advised of  availability.       [id number] [facility] Daniel Zepeda,AnaM, Juan[address] Provider   Paging Service [phone] Nsaids Counseling: NSAID Counseling: I discussed with the patient that NSAIDs should be taken with food. Prolonged use of NSAIDs can result in the development of stomach ulcers.  Patient advised to stop taking NSAIDs if abdominal pain occurs.  The patient verbalized understanding of the proper use and possible adverse effects of NSAIDs.  All of the patient's questions and concerns were addressed.

## 2022-10-31 PROCEDURE — 96374 THER/PROPH/DIAG INJ IV PUSH: CPT

## 2022-10-31 PROCEDURE — 99284 EMERGENCY DEPT VISIT MOD MDM: CPT

## 2022-10-31 PROCEDURE — 96375 TX/PRO/DX INJ NEW DRUG ADDON: CPT

## 2022-11-01 ENCOUNTER — APPOINTMENT (OUTPATIENT)
Dept: CT IMAGING | Age: 47
End: 2022-11-01
Attending: EMERGENCY MEDICINE
Payer: MEDICARE

## 2022-11-01 ENCOUNTER — HOSPITAL ENCOUNTER (EMERGENCY)
Age: 47
Discharge: HOME | End: 2022-11-01
Attending: EMERGENCY MEDICINE
Payer: MEDICARE

## 2022-11-01 VITALS
WEIGHT: 132.72 LBS | TEMPERATURE: 98.8 F | OXYGEN SATURATION: 100 % | RESPIRATION RATE: 21 BRPM | DIASTOLIC BLOOD PRESSURE: 83 MMHG | BODY MASS INDEX: 20.83 KG/M2 | HEART RATE: 89 BPM | SYSTOLIC BLOOD PRESSURE: 127 MMHG | HEIGHT: 67 IN

## 2022-11-01 DIAGNOSIS — R10.32 ABDOMINAL PAIN, LLQ (LEFT LOWER QUADRANT): Primary | ICD-10-CM

## 2022-11-01 DIAGNOSIS — Z99.2 ESRD ON HEMODIALYSIS (HCC): ICD-10-CM

## 2022-11-01 DIAGNOSIS — N18.6 ESRD ON HEMODIALYSIS (HCC): ICD-10-CM

## 2022-11-01 LAB
ALBUMIN SERPL-MCNC: 3.6 G/DL (ref 3.5–5)
ALBUMIN/GLOB SERPL: 0.6 {RATIO} (ref 1.1–2.2)
ALP SERPL-CCNC: 392 U/L (ref 45–117)
ALT SERPL-CCNC: 16 U/L (ref 12–78)
ANION GAP SERPL CALC-SCNC: 8 MMOL/L (ref 5–15)
AST SERPL-CCNC: 24 U/L (ref 15–37)
BASOPHILS # BLD: 0.2 K/UL (ref 0–0.1)
BASOPHILS NFR BLD: 1 % (ref 0–1)
BILIRUB SERPL-MCNC: 0.4 MG/DL (ref 0.2–1)
BUN SERPL-MCNC: 38 MG/DL (ref 6–20)
BUN/CREAT SERPL: 5 (ref 12–20)
CALCIUM SERPL-MCNC: 7.1 MG/DL (ref 8.5–10.1)
CHLORIDE SERPL-SCNC: 97 MMOL/L (ref 97–108)
CO2 SERPL-SCNC: 28 MMOL/L (ref 21–32)
CREAT SERPL-MCNC: 7.61 MG/DL (ref 0.7–1.3)
DIFFERENTIAL METHOD BLD: ABNORMAL
EOSINOPHIL # BLD: 0.6 K/UL (ref 0–0.4)
EOSINOPHIL NFR BLD: 5 % (ref 0–7)
ERYTHROCYTE [DISTWIDTH] IN BLOOD BY AUTOMATED COUNT: 15 % (ref 11.5–14.5)
GLOBULIN SER CALC-MCNC: 5.6 G/DL (ref 2–4)
GLUCOSE SERPL-MCNC: 81 MG/DL (ref 65–100)
HCT VFR BLD AUTO: 29 % (ref 36.6–50.3)
HGB BLD-MCNC: 9.2 G/DL (ref 12.1–17)
IMM GRANULOCYTES # BLD AUTO: 0 K/UL (ref 0–0.04)
IMM GRANULOCYTES NFR BLD AUTO: 0 % (ref 0–0.5)
LACTATE BLD-SCNC: 1.63 MMOL/L (ref 0.4–2)
LIPASE SERPL-CCNC: 238 U/L (ref 73–393)
LYMPHOCYTES # BLD: 1.9 K/UL (ref 0.8–3.5)
LYMPHOCYTES NFR BLD: 17 % (ref 12–49)
MAGNESIUM SERPL-MCNC: 2 MG/DL (ref 1.6–2.4)
MCH RBC QN AUTO: 30.5 PG (ref 26–34)
MCHC RBC AUTO-ENTMCNC: 31.7 G/DL (ref 30–36.5)
MCV RBC AUTO: 96 FL (ref 80–99)
MONOCYTES # BLD: 1.1 K/UL (ref 0–1)
MONOCYTES NFR BLD: 10 % (ref 5–13)
NEUTS SEG # BLD: 7.4 K/UL (ref 1.8–8)
NEUTS SEG NFR BLD: 67 % (ref 32–75)
NRBC # BLD: 0 K/UL (ref 0–0.01)
NRBC BLD-RTO: 0 PER 100 WBC
PHOSPHATE SERPL-MCNC: 3.5 MG/DL (ref 2.6–4.7)
PLATELET # BLD AUTO: 230 K/UL (ref 150–400)
PMV BLD AUTO: 10.2 FL (ref 8.9–12.9)
POTASSIUM SERPL-SCNC: 3.6 MMOL/L (ref 3.5–5.1)
PROT SERPL-MCNC: 9.2 G/DL (ref 6.4–8.2)
RBC # BLD AUTO: 3.02 M/UL (ref 4.1–5.7)
SODIUM SERPL-SCNC: 133 MMOL/L (ref 136–145)
WBC # BLD AUTO: 11.2 K/UL (ref 4.1–11.1)

## 2022-11-01 PROCEDURE — 80053 COMPREHEN METABOLIC PANEL: CPT

## 2022-11-01 PROCEDURE — 74011250636 HC RX REV CODE- 250/636: Performed by: EMERGENCY MEDICINE

## 2022-11-01 PROCEDURE — 83735 ASSAY OF MAGNESIUM: CPT

## 2022-11-01 PROCEDURE — 84100 ASSAY OF PHOSPHORUS: CPT

## 2022-11-01 PROCEDURE — 74176 CT ABD & PELVIS W/O CONTRAST: CPT

## 2022-11-01 PROCEDURE — 36415 COLL VENOUS BLD VENIPUNCTURE: CPT

## 2022-11-01 PROCEDURE — 96374 THER/PROPH/DIAG INJ IV PUSH: CPT

## 2022-11-01 PROCEDURE — 83690 ASSAY OF LIPASE: CPT

## 2022-11-01 PROCEDURE — 85025 COMPLETE CBC W/AUTO DIFF WBC: CPT

## 2022-11-01 PROCEDURE — 96375 TX/PRO/DX INJ NEW DRUG ADDON: CPT

## 2022-11-01 PROCEDURE — 83605 ASSAY OF LACTIC ACID: CPT

## 2022-11-01 RX ORDER — OXYCODONE HYDROCHLORIDE 5 MG/1
5 TABLET ORAL
Qty: 12 TABLET | Refills: 0 | Status: SHIPPED | OUTPATIENT
Start: 2022-11-01 | End: 2022-11-04

## 2022-11-01 RX ORDER — FENTANYL CITRATE 50 UG/ML
100 INJECTION, SOLUTION INTRAMUSCULAR; INTRAVENOUS
Status: COMPLETED | OUTPATIENT
Start: 2022-11-01 | End: 2022-11-01

## 2022-11-01 RX ORDER — ONDANSETRON 2 MG/ML
4 INJECTION INTRAMUSCULAR; INTRAVENOUS
Status: COMPLETED | OUTPATIENT
Start: 2022-11-01 | End: 2022-11-01

## 2022-11-01 RX ADMIN — ONDANSETRON 4 MG: 2 INJECTION INTRAMUSCULAR; INTRAVENOUS at 03:01

## 2022-11-01 RX ADMIN — FENTANYL CITRATE 100 MCG: 50 INJECTION INTRAMUSCULAR; INTRAVENOUS at 03:01

## 2022-11-01 NOTE — ED PROVIDER NOTES
EMERGENCY DEPARTMENT HISTORY AND PHYSICAL EXAM      Date: 11/1/2022  Patient Name: Miriam Mccord    History of Presenting Illness     Chief Complaint   Patient presents with    Abdominal Pain     Patient stated that around 1600 today he started with lower abdominal pain. Patient has also had nausea and vomiting. Denies any diarrhea, constipation or indigestion. Denies any blood in the stool or vomit. Patient anuric due to dialysis. He gets his treatments done at home so he gets treatments 5 days a week instead of 3. Denies any analgesics for the pain. History of pancreatitis, and necrotic bowel with resection in the past. Gallbladder and appendix have both been removed. History Provided By: Patient    HPI: Miriam Mccord, 41 y.o. male presents to the ED with cc of abdominal pain. Patient with past medical history of end-stage renal disease on hemodialysis. Patient does home dialysis. Patient complaining of severe abdominal pain in the left lower quadrant that began last night. He rates his pain a 7 out of 10 sharp in nature patient states he is also had nausea and vomiting. He denies any diarrhea or constipation. There is been no melena, hematochezia or bright red blood per rectum. Patient is anuric. He denies any fever or chills. Denies any chest pain or shortness of breath. There are no other complaints, changes, or physical findings at this time. PCP: Mana Montez MD    No current facility-administered medications on file prior to encounter. Current Outpatient Medications on File Prior to Encounter   Medication Sig Dispense Refill    aspirin delayed-release 81 mg tablet Take 1 Tablet by mouth daily. 30 Tablet 5    ondansetron (ZOFRAN ODT) 4 mg disintegrating tablet Take 1 Tablet by mouth every eight (8) hours as needed for Nausea or Vomiting.  (Patient not taking: Reported on 9/19/2022) 10 Tablet 0    sevelamer carbonate (RENVELA) 800 mg tab tab Take 1,600 mg by mouth three (3) times daily.      ondansetron hcl (Zofran) 4 mg tablet Take 1 Tablet by mouth every twelve (12) hours as needed for Nausea or Vomiting. (Patient not taking: Reported on 9/19/2022) 20 Tablet 0    dicyclomine (BENTYL) 20 mg tablet Take 1 Tablet by mouth every six (6) hours as needed for Abdominal Cramps. (Patient not taking: Reported on 9/19/2022) 20 Tablet 0    metoprolol succinate (TOPROL-XL) 25 mg XL tablet Take 12.5 mg by mouth daily. pantoprazole (PROTONIX) 40 mg tablet Take 1 Tablet by mouth Before breakfast and dinner. 60 Tablet 2    sucralfate (CARAFATE) 1 gram tablet Take 1 Tablet by mouth Before breakfast, lunch, dinner and at bedtime. (Patient not taking: Reported on 9/19/2022) 120 Tablet 2    polyethylene glycol (MIRALAX) 17 gram packet Take 1 Packet by mouth daily. (Patient not taking: Reported on 9/19/2022) 30 Packet 0    Vitamin D2 1,250 mcg (50,000 unit) capsule Take 50,000 Units by mouth every seven (7) days. calcium acetate,phosphat bind, (PHOSLO) 667 mg cap Take 2 Caps by mouth three (3) times daily (with meals). Indications: low amount of calcium in the blood, renal osteodystrophy with hyperphosphatemia 120 Cap 0    atorvastatin (LIPITOR) 20 mg tablet Take 20 mg by mouth daily. acetaminophen (TYLENOL) 500 mg tablet Take 1 Tab by mouth every four (4) hours as needed for Pain. Over the counter 30 Tab 0    calcitRIOL (ROCALTROL) 0.5 mcg capsule Take 0.5 mcg by mouth daily. Past History     Past Medical History:  Past Medical History:   Diagnosis Date    Abdominal hematoma     AICD (automatic cardioverter/defibrillator) present     CAD (coronary artery disease)     anterior MI s/p 2 stents  2007    Chronic abdominal pain     Chronic kidney disease     ESRD; Dialysis dependent.  [facility] [address] home x5 days week M-F does labs    DVT (deep venous thrombosis) ([facility] [address] 2001    DVT of popliteal vein ([facility] [address] 11/2011    not anticoagulated due to bleeding, IVC filter    Endocarditis Gallstone pancreatitis     Gastrointestinal disorder     acid reflux    Gastrointestinal disorder     peptic ulcer    Hemodialysis patient ([address]     High cholesterol     HIT (heparin-induced thrombocytopenia) ([facility] [address]     Hypertension     Kidney transplant     b/l kidneys 1995, 2001    Long term current use of anticoagulant therapy     Nephrotic syndrome     Other ill-defined conditions(799.89)     kidny transplant x2,  on dialysis    Other ill-defined conditions(799.89)     home dialysis    Other ill-defined conditions(799.89)     hx recurrent left leg DVT    Peritonitis ([facility] [address]     Seizures ([facility] [address] 2015    most recent- only had three in lifetime    Small bowel obstruction (HCC)     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    V-tach ([facility] [address]        Past Surgical History:  Past Surgical History:   Procedure Laterality Date    HX APPENDECTOMY      HX CHOLECYSTECTOMY      HX OTHER SURGICAL  11/2011    exploratory laparotomy    HX OTHER SURGICAL      fem-pop    HX OTHER SURGICAL  02/2013    right upper extremity fistula    HX PACEMAKER Left 6/2009    AICD-st latonya-not connected    HX PACEMAKER Left     AICD-left side-BS-working    HX SMALL BOWEL RESECTION      HX TRANSPLANT  2001     Kidney    HX TRANSPLANT  1992    kidney    HX VASCULAR ACCESS Right     femoral access for HD- does 5 day dialysis @ home/left upper arm graft active, right upper arm occluded    IR INSERT NON TUNL CVC OVER 5 YRS  12/7/2021    IR INSERT NON TUNL CVC OVER 5 YRS  12/10/2021    IR INSERT TIPS HEPATIC SHUNT  12/30/2021    IR REMOVE TUNL CVAD W/O PORT / PUMP  10/29/2020    IR REPLACE CVC TUNNELED W/O PORT  9/10/2020    IL CARDIAC SURG PROCEDURE UNLIST  2007    2 stents    IL EDG US EXAM SURGICAL ALTER STOM DUODENUM/JEJUNUM  7/15/2011         IL ESOPHAGOGASTRODUODENOSCOPY TRANSORAL DIAGNOSTIC  5/24/2012         UPPER GI ENDOSCOPY,CTRL BLEED  12/6/2021         UPPER GI ENDOSCOPY,DIAGNOSIS  12/8/2021         UPPER GI ENDOSCOPY,DIAGNOSIS 4/20/2022         VASCULAR SURGERY PROCEDURE UNLIST  11/14/2017    Insertion AV graft right upper arm (bovine); ligation of AV fistula right arm       Family History:  Family History   Problem Relation Age of Onset    COPD Mother     Lung Disease Mother     Cancer Father         stomach    Cancer Maternal Grandmother        Social History:  Social History     Tobacco Use    Smoking status: Never    Smokeless tobacco: Never   Vaping Use    Vaping Use: Never used   Substance Use Topics    Alcohol use: No    Drug use: Yes     Types: Prescription, OTC       Allergies: Allergies   Allergen Reactions    Shellfish Containing Products Swelling     Break out in rash, trouble breathing    Contrast Agent [Iodine] Shortness of Breath    Heparin Analogues Other (comments)     Positive history of HIT         Review of Systems   Review of Systems   Constitutional: Negative. Negative for appetite change, chills, fatigue and fever. HENT: Negative. Negative for congestion, rhinorrhea, sinus pressure and sore throat. Eyes: Negative. Respiratory: Negative. Negative for cough, choking, chest tightness, shortness of breath and wheezing. Cardiovascular: Negative. Negative for chest pain, palpitations and leg swelling. Gastrointestinal:  Positive for abdominal pain, nausea and vomiting. Negative for constipation and diarrhea. Endocrine: Negative. Genitourinary: Negative. Negative for difficulty urinating, dysuria, flank pain and urgency. Musculoskeletal: Negative. Skin: Negative. Neurological: Negative. Negative for dizziness, speech difficulty, weakness, light-headedness, numbness and headaches. Psychiatric/Behavioral: Negative. All other systems reviewed and are negative. Physical Exam   Physical Exam  Vitals and nursing note reviewed. Constitutional:       General: He is not in acute distress. Appearance: He is well-developed. He is not diaphoretic.    HENT:      Head: Normocephalic and atraumatic. Mouth/Throat:      Mouth: Mucous membranes are moist.      Pharynx: No oropharyngeal exudate. Eyes:      Extraocular Movements: Extraocular movements intact. Conjunctiva/sclera: Conjunctivae normal.      Pupils: Pupils are equal, round, and reactive to light. Neck:      Vascular: No JVD. Trachea: No tracheal deviation. Cardiovascular:      Rate and Rhythm: Normal rate and regular rhythm. Heart sounds: Normal heart sounds. No murmur heard. Pulmonary:      Effort: Pulmonary effort is normal. No respiratory distress. Breath sounds: Normal breath sounds. No stridor. No wheezing or rales. Comments:  cardiac device in place  Abdominal:      General: There is no distension. Palpations: Abdomen is soft. Tenderness: There is abdominal tenderness (LLQ). There is no guarding or rebound. Musculoskeletal:         General: Normal range of motion. Cervical back: Normal range of motion and neck supple. Right lower leg: No edema. Left lower leg: No edema. Skin:     General: Skin is warm and dry. Capillary Refill: Capillary refill takes less than 2 seconds. Neurological:      Mental Status: He is alert and oriented to person, place, and time. Cranial Nerves: No cranial nerve deficit.       Comments: No gross motor or sensory deficits    Psychiatric:         Mood and Affect: Mood normal.         Behavior: Behavior normal.       Diagnostic Study Results     Labs -     Recent Results (from the past 12 hour(s))   CBC WITH AUTOMATED DIFF    Collection Time: 11/01/22  2:50 AM   Result Value Ref Range    WBC 11.2 (H) 4.1 - 11.1 K/uL    RBC 3.02 (L) 4.10 - 5.70 M/uL    HGB 9.2 (L) 12.1 - 17.0 g/dL    HCT 29.0 (L) 36.6 - 50.3 %    MCV 96.0 80.0 - 99.0 FL    MCH 30.5 26.0 - 34.0 PG    MCHC 31.7 30.0 - 36.5 g/dL    RDW 15.0 (H) 11.5 - 14.5 %    PLATELET 404 049 - 202 K/uL    MPV 10.2 8.9 - 12.9 FL    NRBC 0.0 0  WBC    ABSOLUTE NRBC 0.00 0.00 - 0.01 K/uL    NEUTROPHILS 67 32 - 75 %    LYMPHOCYTES 17 12 - 49 %    MONOCYTES 10 5 - 13 %    EOSINOPHILS 5 0 - 7 %    BASOPHILS 1 0 - 1 %    IMMATURE GRANULOCYTES 0 0.0 - 0.5 %    ABS. NEUTROPHILS 7.4 1.8 - 8.0 K/UL    ABS. LYMPHOCYTES 1.9 0.8 - 3.5 K/UL    ABS. MONOCYTES 1.1 (H) 0.0 - 1.0 K/UL    ABS. EOSINOPHILS 0.6 (H) 0.0 - 0.4 K/UL    ABS. BASOPHILS 0.2 (H) 0.0 - 0.1 K/UL    ABS. IMM. GRANS. 0.0 0.00 - 0.04 K/UL    DF AUTOMATED     METABOLIC PANEL, COMPREHENSIVE    Collection Time: 11/01/22  2:50 AM   Result Value Ref Range    Sodium 133 (L) 136 - 145 mmol/L    Potassium 3.6 3.5 - 5.1 mmol/L    Chloride 97 97 - 108 mmol/L    CO2 28 21 - 32 mmol/L    Anion gap 8 5 - 15 mmol/L    Glucose 81 65 - 100 mg/dL    BUN 38 (H) 6 - 20 MG/DL    Creatinine 7.61 (H) 0.70 - 1.30 MG/DL    BUN/Creatinine ratio 5 (L) 12 - 20      eGFR 8 (L) >60 ml/min/1.73m2    Calcium 7.1 (L) 8.5 - 10.1 MG/DL    Bilirubin, total 0.4 0.2 - 1.0 MG/DL    ALT (SGPT) 16 12 - 78 U/L    AST (SGOT) 24 15 - 37 U/L    Alk. phosphatase 392 (H) 45 - 117 U/L    Protein, total 9.2 (H) 6.4 - 8.2 g/dL    Albumin 3.6 3.5 - 5.0 g/dL    Globulin 5.6 (H) 2.0 - 4.0 g/dL    A-G Ratio 0.6 (L) 1.1 - 2.2     LIPASE    Collection Time: 11/01/22  2:50 AM   Result Value Ref Range    Lipase 238 73 - 393 U/L   MAGNESIUM    Collection Time: 11/01/22  2:50 AM   Result Value Ref Range    Magnesium 2.0 1.6 - 2.4 mg/dL   PHOSPHORUS    Collection Time: 11/01/22  2:50 AM   Result Value Ref Range    Phosphorus 3.5 2.6 - 4.7 MG/DL   POC LACTIC ACID    Collection Time: 11/01/22  2:57 AM   Result Value Ref Range    Lactic Acid (POC) 1.63 0.40 - 2.00 mmol/L       Radiologic Studies -   CT ABD PELV WO CONT   Final Result      1. No acute findings or findings to correlate with left lower quadrant abdominal   pain. 2. Large loculated right pleural effusion.    3. Severe chronic medical renal disease with bilateral renal atrophy and   probable calcified rim calcified nonfunctional pelvic transplant. 4. Sclerotic response likely reflects renal osteodystrophy. 5. Additional incidentals as above including bilateral coarse calcifications in   the retroperitoneum anterior to the psoas muscles appear        CT Results  (Last 48 hours)                 11/01/22 0232  CT ABD PELV WO CONT Final result    Impression:      1. No acute findings or findings to correlate with left lower quadrant abdominal   pain. 2. Large loculated right pleural effusion. 3. Severe chronic medical renal disease with bilateral renal atrophy and   probable calcified rim calcified nonfunctional pelvic transplant. 4. Sclerotic response likely reflects renal osteodystrophy. 5. Additional incidentals as above including bilateral coarse calcifications in   the retroperitoneum anterior to the psoas muscles appear       Narrative:  EXAM: CT ABD PELV WO CONT       INDICATION: LLQ pain, prior hx of necrotic bowel       COMPARISON: CT 8/10/2022. IV CONTRAST: None. ORAL CONTRAST: None. TECHNIQUE:    Thin axial images were obtained through the abdomen and pelvis. Coronal and   sagittal reformats were generated. CT dose reduction was achieved through use of   a standardized protocol tailored for this examination and automatic exposure   control for dose modulation. The absence of intravenous contrast material reduces the sensitivity for   evaluation of the vasculature and solid organs. FINDINGS:    LOWER THORAX: There is a large right pleural effusion with pleural thickening. There is atelectasis of the right lung base overlying effusion with aerated   lungs appearing clear. LIVER: No mass. BILIARY TREE: Gallbladder is surgically absent. CBD is not dilated. SPLEEN: within normal limits. PANCREAS: No focal abnormality. ADRENALS: Unremarkable. KIDNEYS/URETERS: Negative kidneys are severely atrophic bilaterally with no   calculus, hydronephrosis, or other abnormality.  Rim calcified soft tissue   density in the midline upper pelvis likely reflects atrophic renal transplant. STOMACH: Unremarkable. SMALL BOWEL: No dilatation or wall thickening. COLON: No dilatation or wall thickening. APPENDIX: Surgically absent. PERITONEUM: No ascites or pneumoperitoneum. RETROPERITONEUM: Atherosclerotic calcification without aneurysm. Coarse   calcifications anterior to the iliopsoas muscles bilaterally, unchanged from   prior. REPRODUCTIVE ORGANS: Prostate and seminal vesicles are unremarkable. URINARY BLADDER: No mass or calculus. BONES: Degenerative spine change with diffuse sclerosis. Osteoarthritic changes   of the hips. No acute fracture or aggressive lesion. ABDOMINAL WALL: No mass or hernia. ADDITIONAL COMMENTS: N/A                 CXR Results  (Last 48 hours)      None            Medical Decision Making   I am the first provider for this patient. I reviewed the vital signs, available nursing notes, past medical history, past surgical history, family history and social history. Vital Signs-Reviewed the patient's vital signs. Patient Vitals for the past 12 hrs:   Temp Pulse Resp BP SpO2   11/01/22 0409 -- 89 21 127/83 100 %   11/01/22 0354 -- 89 19 122/85 100 %   11/01/22 0339 -- 88 26 (!) 125/93 98 %   11/01/22 0324 -- 87 21 123/86 100 %   11/01/22 0309 -- 91 26 (!) 142/91 98 %   10/31/22 2211 98.8 °F (37.1 °C) (!) 102 16 116/89 100 %       Records Reviewed: Nursing Notes, Old Medical Records, Previous Radiology Studies, and Previous Laboratory Studies, last hospital admission 9/16/2022 for critical lower limb ischemia secondary to peripheral arterial disease patient has a history of abdominal pain as well as constipation    Provider Notes (Medical Decision Making):   DDx-     ED Course:   Initial assessment performed. The patients presenting problems have been discussed, and they are in agreement with the care plan formulated and outlined with them.   I have encouraged them to ask questions as they arise throughout their visit. Patient CT unremarkable. Patient's pain has been well controlled and is feeling better. Will discharge home with limited quantity supply pain medication. Disposition:    DC- Adult Discharges: All of the diagnostic tests were reviewed and questions answered. Diagnosis, care plan and treatment options were discussed. The patient understands the instructions and will follow up as directed. The patients results have been reviewed with them. They have been counseled regarding their diagnosis. The patient verbally convey understanding and agreement of the signs, symptoms, diagnosis, treatment and prognosis and additionally agrees to follow up as recommended with their PCP in 24 - 48 hours. They also agree with the care-plan and convey that all of their questions have been answered. I have also put together some discharge instructions for them that include: 1) educational information regarding their diagnosis, 2) how to care for their diagnosis at home, as well a 3) list of reasons why they would want to return to the ED prior to their follow-up appointment, should their condition change. DISCHARGE PLAN:  1. Discharge Medication List as of 11/1/2022  4:14 AM        START taking these medications    Details   oxyCODONE IR (Roxicodone) 5 mg immediate release tablet Take 1 Tablet by mouth every six (6) hours as needed for Pain for up to 3 days. Max Daily Amount: 20 mg., Normal, Disp-12 Tablet, R-0           CONTINUE these medications which have NOT CHANGED    Details   aspirin delayed-release 81 mg tablet Take 1 Tablet by mouth daily. , Normal, Disp-30 Tablet, R-5      ondansetron (ZOFRAN ODT) 4 mg disintegrating tablet Take 1 Tablet by mouth every eight (8) hours as needed for Nausea or Vomiting., Normal, Disp-10 Tablet, R-0      sevelamer carbonate (RENVELA) 800 mg tab tab Take 1,600 mg by mouth three (3) times daily. , Historical Med ondansetron hcl (Zofran) 4 mg tablet Take 1 Tablet by mouth every twelve (12) hours as needed for Nausea or Vomiting., Normal, Disp-20 Tablet, R-0      dicyclomine (BENTYL) 20 mg tablet Take 1 Tablet by mouth every six (6) hours as needed for Abdominal Cramps., Normal, Disp-20 Tablet, R-0      metoprolol succinate (TOPROL-XL) 25 mg XL tablet Take 12.5 mg by mouth daily. , Historical Med      pantoprazole (PROTONIX) 40 mg tablet Take 1 Tablet by mouth Before breakfast and dinner., Normal, Disp-60 Tablet, R-2      sucralfate (CARAFATE) 1 gram tablet Take 1 Tablet by mouth Before breakfast, lunch, dinner and at bedtime., Normal, Disp-120 Tablet, R-2      polyethylene glycol (MIRALAX) 17 gram packet Take 1 Packet by mouth daily. , Normal, Disp-30 Packet, R-0      Vitamin D2 1,250 mcg (50,000 unit) capsule Take 50,000 Units by mouth every seven (7) days. , Historical Med, KIMBERLY      calcium acetate,phosphat bind, (PHOSLO) 667 mg cap Take 2 Caps by mouth three (3) times daily (with meals). Indications: low amount of calcium in the blood, renal osteodystrophy with hyperphosphatemia, Normal, Disp-120 Cap,R-0      atorvastatin (LIPITOR) 20 mg tablet Take 20 mg by mouth daily. , Historical Med      acetaminophen (TYLENOL) 500 mg tablet Take 1 Tab by mouth every four (4) hours as needed for Pain. Over the counter, No Print, Disp-30 Tab,R-0      calcitRIOL (ROCALTROL) 0.5 mcg capsule Take 0.5 mcg by mouth daily. , Historical Med           2. Follow-up Information       Follow up With Specialties Details Why Contact Info    Genaro Gomez, [address] [address] [phone]            3. Return to ED if worse     Diagnosis     Clinical Impression:   1. Abdominal pain, LLQ (left lower quadrant)    2. ESRD on hemodialysis [facility])        Attestations:    Ruchi Delacruz, DO        Please note that this dictation was completed with Nevigo, the computer voice recognition software.   Quite often unanticipated grammatical, syntax, homophones, and other interpretive errors are inadvertently transcribed by the computer software. Please disregard these errors. Please excuse any errors that have escaped final proofreading. Thank you.

## 2022-12-04 ENCOUNTER — HOSPITAL ENCOUNTER (EMERGENCY)
Age: 47
Discharge: HOME | End: 2022-12-04
Attending: EMERGENCY MEDICINE
Payer: MEDICARE

## 2022-12-04 ENCOUNTER — APPOINTMENT (OUTPATIENT)
Dept: CT IMAGING | Age: 47
End: 2022-12-04
Attending: EMERGENCY MEDICINE
Payer: MEDICARE

## 2022-12-04 VITALS
WEIGHT: 130.95 LBS | RESPIRATION RATE: 18 BRPM | SYSTOLIC BLOOD PRESSURE: 121 MMHG | TEMPERATURE: 98.4 F | OXYGEN SATURATION: 99 % | HEART RATE: 94 BPM | HEIGHT: 67 IN | DIASTOLIC BLOOD PRESSURE: 86 MMHG | BODY MASS INDEX: 20.55 KG/M2

## 2022-12-04 DIAGNOSIS — R10.9 ACUTE ABDOMINAL PAIN: Primary | ICD-10-CM

## 2022-12-04 LAB
ALBUMIN SERPL-MCNC: 3.4 G/DL (ref 3.5–5)
ALBUMIN/GLOB SERPL: 0.6 {RATIO} (ref 1.1–2.2)
ALP SERPL-CCNC: 368 U/L (ref 45–117)
ALT SERPL-CCNC: 10 U/L (ref 12–78)
ANION GAP SERPL CALC-SCNC: 7 MMOL/L (ref 5–15)
AST SERPL-CCNC: 13 U/L (ref 15–37)
BASOPHILS # BLD: 0.1 K/UL (ref 0–0.1)
BASOPHILS NFR BLD: 1 % (ref 0–1)
BILIRUB SERPL-MCNC: 0.5 MG/DL (ref 0.2–1)
BUN SERPL-MCNC: 29 MG/DL (ref 6–20)
BUN/CREAT SERPL: 6 (ref 12–20)
CALCIUM SERPL-MCNC: 9.7 MG/DL (ref 8.5–10.1)
CHLORIDE SERPL-SCNC: 99 MMOL/L (ref 97–108)
CO2 SERPL-SCNC: 30 MMOL/L (ref 21–32)
COMMENT, HOLDF: NORMAL
CREAT SERPL-MCNC: 4.93 MG/DL (ref 0.7–1.3)
DIFFERENTIAL METHOD BLD: ABNORMAL
EOSINOPHIL # BLD: 0.4 K/UL (ref 0–0.4)
EOSINOPHIL NFR BLD: 4 % (ref 0–7)
ERYTHROCYTE [DISTWIDTH] IN BLOOD BY AUTOMATED COUNT: 16.8 % (ref 11.5–14.5)
GLOBULIN SER CALC-MCNC: 5.5 G/DL (ref 2–4)
GLUCOSE SERPL-MCNC: 100 MG/DL (ref 65–100)
HCT VFR BLD AUTO: 37 % (ref 36.6–50.3)
HGB BLD-MCNC: 11.8 G/DL (ref 12.1–17)
IMM GRANULOCYTES # BLD AUTO: 0 K/UL (ref 0–0.04)
IMM GRANULOCYTES NFR BLD AUTO: 0 % (ref 0–0.5)
LYMPHOCYTES # BLD: 1.6 K/UL (ref 0.8–3.5)
LYMPHOCYTES NFR BLD: 18 % (ref 12–49)
MCH RBC QN AUTO: 31.3 PG (ref 26–34)
MCHC RBC AUTO-ENTMCNC: 31.9 G/DL (ref 30–36.5)
MCV RBC AUTO: 98.1 FL (ref 80–99)
MONOCYTES # BLD: 1.1 K/UL (ref 0–1)
MONOCYTES NFR BLD: 14 % (ref 5–13)
NEUTS SEG # BLD: 5.3 K/UL (ref 1.8–8)
NEUTS SEG NFR BLD: 63 % (ref 32–75)
NRBC # BLD: 0 K/UL (ref 0–0.01)
NRBC BLD-RTO: 0 PER 100 WBC
PLATELET # BLD AUTO: 211 K/UL (ref 150–400)
PMV BLD AUTO: 10.4 FL (ref 8.9–12.9)
POTASSIUM SERPL-SCNC: 3.5 MMOL/L (ref 3.5–5.1)
PROT SERPL-MCNC: 8.9 G/DL (ref 6.4–8.2)
RBC # BLD AUTO: 3.77 M/UL (ref 4.1–5.7)
SAMPLES BEING HELD,HOLD: NORMAL
SODIUM SERPL-SCNC: 136 MMOL/L (ref 136–145)
WBC # BLD AUTO: 8.5 K/UL (ref 4.1–11.1)

## 2022-12-04 PROCEDURE — 96374 THER/PROPH/DIAG INJ IV PUSH: CPT

## 2022-12-04 PROCEDURE — 99284 EMERGENCY DEPT VISIT MOD MDM: CPT

## 2022-12-04 PROCEDURE — 36415 COLL VENOUS BLD VENIPUNCTURE: CPT

## 2022-12-04 PROCEDURE — 74011250636 HC RX REV CODE- 250/636: Performed by: EMERGENCY MEDICINE

## 2022-12-04 PROCEDURE — 74176 CT ABD & PELVIS W/O CONTRAST: CPT

## 2022-12-04 PROCEDURE — 85025 COMPLETE CBC W/AUTO DIFF WBC: CPT

## 2022-12-04 PROCEDURE — 80053 COMPREHEN METABOLIC PANEL: CPT

## 2022-12-04 PROCEDURE — 96375 TX/PRO/DX INJ NEW DRUG ADDON: CPT

## 2022-12-04 RX ORDER — ONDANSETRON 2 MG/ML
4 INJECTION INTRAMUSCULAR; INTRAVENOUS
Status: COMPLETED | OUTPATIENT
Start: 2022-12-04 | End: 2022-12-04

## 2022-12-04 RX ORDER — DICYCLOMINE HYDROCHLORIDE 20 MG/1
20 TABLET ORAL
Qty: 20 TABLET | Refills: 0 | Status: SHIPPED | OUTPATIENT
Start: 2022-12-04

## 2022-12-04 RX ORDER — FENTANYL CITRATE 50 UG/ML
60 INJECTION, SOLUTION INTRAMUSCULAR; INTRAVENOUS
Status: COMPLETED | OUTPATIENT
Start: 2022-12-04 | End: 2022-12-04

## 2022-12-04 RX ADMIN — ONDANSETRON 4 MG: 2 INJECTION INTRAMUSCULAR; INTRAVENOUS at 02:48

## 2022-12-04 RX ADMIN — FENTANYL CITRATE 60 MCG: 50 INJECTION, SOLUTION INTRAMUSCULAR; INTRAVENOUS at 02:50

## 2022-12-04 NOTE — ED PROVIDER NOTES
EMERGENCY DEPARTMENT HISTORY AND PHYSICAL EXAM      Date: 12/4/2022  Patient Name: Debby Holliday  Patient Age and Sex: 41 y.o. male     History of Presenting Illness     Chief Complaint   Patient presents with    Abdominal Pain     ED visit d/t abd pain, (L)LQ pain  onset of sxs, 1730 - 7/10 pain scale - nausea and vomiting with sxs - worsening pain - extensive cardiac hx - hx of ESRD, (L) AV fistula access - Denies fevers / chills / Harvy Mccord / cp        History Provided By: Patient    HPI: Debby Holliday is a 43-year-old history PUD, diverticulitis, chronic abdominal pain and chronic nausea gastroparesis presenting with abdominal pain nausea and vomiting. He reports since 5 PM last night has had persistent worsening diffuse abdominal pain worse in his left lower quadrant. He has had associated nausea and vomiting, vomitus is dark green, no coffee-ground appearance, no blood. Had a normal bowel movement Friday though does suffer from chronic constipation. There are no other complaints, changes, or physical findings at this time. PCP: Jesse Estrada MD    No current facility-administered medications on file prior to encounter. Current Outpatient Medications on File Prior to Encounter   Medication Sig Dispense Refill    aspirin delayed-release 81 mg tablet Take 1 Tablet by mouth daily. 30 Tablet 5    ondansetron (ZOFRAN ODT) 4 mg disintegrating tablet Take 1 Tablet by mouth every eight (8) hours as needed for Nausea or Vomiting. (Patient not taking: Reported on 9/19/2022) 10 Tablet 0    sevelamer carbonate (RENVELA) 800 mg tab tab Take 1,600 mg by mouth three (3) times daily. ondansetron hcl (Zofran) 4 mg tablet Take 1 Tablet by mouth every twelve (12) hours as needed for Nausea or Vomiting. (Patient not taking: Reported on 9/19/2022) 20 Tablet 0    [DISCONTINUED] dicyclomine (BENTYL) 20 mg tablet Take 1 Tablet by mouth every six (6) hours as needed for Abdominal Cramps.  (Patient not taking: Reported on 9/19/2022) 20 Tablet 0    metoprolol succinate (TOPROL-XL) 25 mg XL tablet Take 12.5 mg by mouth daily. pantoprazole (PROTONIX) 40 mg tablet Take 1 Tablet by mouth Before breakfast and dinner. 60 Tablet 2    sucralfate (CARAFATE) 1 gram tablet Take 1 Tablet by mouth Before breakfast, lunch, dinner and at bedtime. (Patient not taking: Reported on 9/19/2022) 120 Tablet 2    polyethylene glycol (MIRALAX) 17 gram packet Take 1 Packet by mouth daily. (Patient not taking: Reported on 9/19/2022) 30 Packet 0    Vitamin D2 1,250 mcg (50,000 unit) capsule Take 50,000 Units by mouth every seven (7) days. calcium acetate,phosphat bind, (PHOSLO) 667 mg cap Take 2 Caps by mouth three (3) times daily (with meals). Indications: low amount of calcium in the blood, renal osteodystrophy with hyperphosphatemia 120 Cap 0    atorvastatin (LIPITOR) 20 mg tablet Take 20 mg by mouth daily. acetaminophen (TYLENOL) 500 mg tablet Take 1 Tab by mouth every four (4) hours as needed for Pain. Over the counter 30 Tab 0    calcitRIOL (ROCALTROL) 0.5 mcg capsule Take 0.5 mcg by mouth daily. Past History     Past Medical History:  Past Medical History:   Diagnosis Date    Abdominal hematoma     AICD (automatic cardioverter/defibrillator) present     CAD (coronary artery disease)     anterior MI s/p 2 stents  2007    Chronic abdominal pain     Chronic kidney disease     ESRD; Dialysis dependent.  [facility] [address] home x5 days week M-F does labs    DVT (deep venous thrombosis) ([facility] [address] 2001    DVT of popliteal vein ([facility] [address] 11/2011    not anticoagulated due to bleeding, IVC filter    Endocarditis     Gallstone pancreatitis     Gastrointestinal disorder     acid reflux    Gastrointestinal disorder     peptic ulcer    Hemodialysis patient ([facility] [address]     High cholesterol     HIT (heparin-induced thrombocytopenia)     Hypertension     Kidney transplant     b/l kidneys 1995, 2001    Long term current use of anticoagulant therapy Nephrotic syndrome     Other ill-defined conditions(799.89)     kidny transplant x2,  on dialysis    Other ill-defined conditions(799.89)     home dialysis    Other ill-defined conditions(799.89)     hx recurrent left leg DVT    Peritonitis ([facility] [address]     Seizures ([facility] [address] 2015    most recent- only had three in lifetime    Small bowel obstruction (HCC)     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    V-tach        Past Surgical History:  Past Surgical History:   Procedure Laterality Date    HX APPENDECTOMY      HX CHOLECYSTECTOMY      HX OTHER SURGICAL  11/2011    exploratory laparotomy    HX OTHER SURGICAL      fem-pop    HX OTHER SURGICAL  02/2013    right upper extremity fistula    HX PACEMAKER Left 6/2009    AICD-st latonya-not connected    HX PACEMAKER Left     AICD-left side-BS-working    HX SMALL BOWEL RESECTION      HX TRANSPLANT  2001     Kidney    HX TRANSPLANT  1992    kidney    HX VASCULAR ACCESS Right     femoral access for HD- does 5 day dialysis @ home/left upper arm graft active, right upper arm occluded    IR INSERT NON TUNL CVC OVER 5 YRS  12/7/2021    IR INSERT NON TUNL CVC OVER 5 YRS  12/10/2021    IR INSERT TIPS HEPATIC SHUNT  12/30/2021    IR REMOVE TUNL CVAD W/O PORT / PUMP  10/29/2020    IR REPLACE CVC TUNNELED W/O PORT  9/10/2020    NC CARDIAC SURG PROCEDURE UNLIST  2007    2 stents    NC EDG US EXAM SURGICAL ALTER STOM DUODENUM/JEJUNUM  7/15/2011         NC ESOPHAGOGASTRODUODENOSCOPY TRANSORAL DIAGNOSTIC  5/24/2012         UPPER GI ENDOSCOPY,CTRL BLEED  12/6/2021         UPPER GI ENDOSCOPY,DIAGNOSIS  12/8/2021         UPPER GI ENDOSCOPY,DIAGNOSIS  4/20/2022         VASCULAR SURGERY PROCEDURE UNLIST  11/14/2017    Insertion AV graft right upper arm (bovine); ligation of AV fistula right arm       Family History:  Family History   Problem Relation Age of Onset    COPD Mother     Lung Disease Mother     Cancer Father         stomach    Cancer Maternal Grandmother        Social History:  Social History     Tobacco Use    Smoking status: Never    Smokeless tobacco: Never   Vaping Use    Vaping Use: Never used   Substance Use Topics    Alcohol use: No    Drug use: Yes     Types: Prescription, OTC       Allergies: Allergies   Allergen Reactions    Shellfish Containing Products Swelling     Break out in rash, trouble breathing    Contrast Agent [Iodine] Shortness of Breath    Heparin Analogues Other (comments)     Positive history of HIT         Review of Systems   Review of Systems   Constitutional:  Negative for chills and fever. HENT:  Negative for congestion and rhinorrhea. Respiratory:  Negative for shortness of breath. Cardiovascular:  Negative for chest pain. Gastrointestinal:  Positive for abdominal pain, nausea and vomiting. Negative for blood in stool and diarrhea. Genitourinary:  Negative for dysuria and frequency. Musculoskeletal:  Negative for myalgias. Skin:  Negative for rash. Neurological:  Negative for weakness and numbness. All other systems reviewed and are negative. Physical Exam   Physical Exam  Vitals and nursing note reviewed. Constitutional:       Appearance: Normal appearance. He is not ill-appearing. HENT:      Head: Normocephalic. Mouth/Throat:      Mouth: Mucous membranes are moist.   Eyes:      Conjunctiva/sclera: Conjunctivae normal.   Cardiovascular:      Rate and Rhythm: Normal rate and regular rhythm. Pulses: Normal pulses. Comments: Left upper extremity AV fistula  Pulmonary:      Effort: Pulmonary effort is normal.   Abdominal:      General: Abdomen is flat. There is no distension. Palpations: Abdomen is soft. There is no mass. Tenderness: There is abdominal tenderness. There is no guarding or rebound. Hernia: A hernia is present. Comments: Diffuse tenderness to palpation, multiple abdominal scars. Palpable hernia umbilical, soft reducible. Musculoskeletal:         General: No deformity. Skin:     General: Skin is warm and dry. Neurological:      Mental Status: He is alert and oriented to person, place, and time. Mental status is at baseline. Psychiatric:         Behavior: Behavior normal.         Thought Content: Thought content normal.        Diagnostic Study Results     Labs -     Recent Results (from the past 12 hour(s))   CBC WITH AUTOMATED DIFF    Collection Time: 12/04/22  1:27 AM   Result Value Ref Range    WBC 8.5 4.1 - 11.1 K/uL    RBC 3.77 (L) 4.10 - 5.70 M/uL    HGB 11.8 (L) 12.1 - 17.0 g/dL    HCT 37.0 36.6 - 50.3 %    MCV 98.1 80.0 - 99.0 FL    MCH 31.3 26.0 - 34.0 PG    MCHC 31.9 30.0 - 36.5 g/dL    RDW 16.8 (H) 11.5 - 14.5 %    PLATELET 849 893 - 758 K/uL    MPV 10.4 8.9 - 12.9 FL    NRBC 0.0 0  WBC    ABSOLUTE NRBC 0.00 0.00 - 0.01 K/uL    NEUTROPHILS 63 32 - 75 %    LYMPHOCYTES 18 12 - 49 %    MONOCYTES 14 (H) 5 - 13 %    EOSINOPHILS 4 0 - 7 %    BASOPHILS 1 0 - 1 %    IMMATURE GRANULOCYTES 0 0.0 - 0.5 %    ABS. NEUTROPHILS 5.3 1.8 - 8.0 K/UL    ABS. LYMPHOCYTES 1.6 0.8 - 3.5 K/UL    ABS. MONOCYTES 1.1 (H) 0.0 - 1.0 K/UL    ABS. EOSINOPHILS 0.4 0.0 - 0.4 K/UL    ABS. BASOPHILS 0.1 0.0 - 0.1 K/UL    ABS. IMM. GRANS. 0.0 0.00 - 0.04 K/UL    DF AUTOMATED     METABOLIC PANEL, COMPREHENSIVE    Collection Time: 12/04/22  1:27 AM   Result Value Ref Range    Sodium 136 136 - 145 mmol/L    Potassium 3.5 3.5 - 5.1 mmol/L    Chloride 99 97 - 108 mmol/L    CO2 30 21 - 32 mmol/L    Anion gap 7 5 - 15 mmol/L    Glucose 100 65 - 100 mg/dL    BUN 29 (H) 6 - 20 MG/DL    Creatinine 4.93 (H) 0.70 - 1.30 MG/DL    BUN/Creatinine ratio 6 (L) 12 - 20      eGFR 14 (L) >60 ml/min/1.73m2    Calcium 9.7 8.5 - 10.1 MG/DL    Bilirubin, total 0.5 0.2 - 1.0 MG/DL    ALT (SGPT) 10 (L) 12 - 78 U/L    AST (SGOT) 13 (L) 15 - 37 U/L    Alk.  phosphatase 368 (H) 45 - 117 U/L    Protein, total 8.9 (H) 6.4 - 8.2 g/dL    Albumin 3.4 (L) 3.5 - 5.0 g/dL    Globulin 5.5 (H) 2.0 - 4.0 g/dL    A-G Ratio 0.6 (L) 1.1 - 2.2     SAMPLES BEING HELD    Collection Time: 12/04/22  1:27 AM   Result Value Ref Range    SAMPLES BEING HELD BLUE     COMMENT        Add-on orders for these samples will be processed based on acceptable specimen integrity and analyte stability, which may vary by analyte. Radiologic Studies -   CT ABD PELV WO CONT   Final Result   Unchanged moderate right pleural effusion and underlying consolidation. Interval   decrease in the size of the small cystic area in the left inguinal region   possibly representing a seroma. No acute intra-abdominal abnormality. CT Results  (Last 48 hours)                 12/04/22 0257  CT ABD PELV WO CONT Final result    Impression:  Unchanged moderate right pleural effusion and underlying consolidation. Interval   decrease in the size of the small cystic area in the left inguinal region   possibly representing a seroma. No acute intra-abdominal abnormality. Narrative:  EXAM: CT ABD PELV WO CONT       INDICATION: abdominal pain       COMPARISON: 11/1/2022       IV CONTRAST: None. ORAL CONTRAST: None       TECHNIQUE:    Thin axial images were obtained through the abdomen and pelvis. Coronal and   sagittal reformats were generated. CT dose reduction was achieved through use of   a standardized protocol tailored for this examination and automatic exposure   control for dose modulation. The absence of intravenous contrast material reduces the sensitivity for   evaluation of the vasculature and solid organs. FINDINGS:    LOWER THORAX: Moderate right pleural effusion with underlying consolidation   again demonstrated. LIVER: No mass. BILIARY TREE: Status post cholecystectomy. CBD is not dilated. SPLEEN: within normal limits. PANCREAS: No focal abnormality. ADRENALS: Unremarkable. KIDNEYS/URETERS: Negative kidneys are atrophic. Calcified bilateral lower   quadrant transplant kidneys are noted. STOMACH: Unremarkable.    SMALL BOWEL: No dilatation or wall thickening. COLON: No dilatation or wall thickening. Moderate fecal stasis. APPENDIX: Surgically absent. PERITONEUM: No ascites or pneumoperitoneum. Stable partially calcified   mesenteric soft tissue nodule. RETROPERITONEUM: No lymphadenopathy or aortic aneurysm. REPRODUCTIVE ORGANS: Unremarkable. URINARY BLADDER: Decompressed. BONES: Diffuse osseous sclerosis compatible with renal disease. ABDOMINAL WALL: 3.6 cm left inguinal cystic area previously measured 4.5 cm. ADDITIONAL COMMENTS: N/A                 CXR Results  (Last 48 hours)      None              Medical Decision Making   I am the first provider for this patient. I reviewed the vital signs, available nursing notes, past medical history, past surgical history, family history and social history. Vital Signs-Reviewed the patient's vital signs. Patient Vitals for the past 12 hrs:   Temp Pulse Resp BP SpO2   12/04/22 0106 98.4 °F (36.9 °C) 94 18 121/86 99 %     Records Reviewed: Nursing Notes and Old Medical Records    Provider Notes (Medical Decision Making):   DDx diverticulitis, consider acute on chronic abdominal pain related to his chronic disease, left lower quadrant, unlikely PUD on pantoprazole and sucralfate. No hematemesis by history, well-appearing. Will obtain CT without contrast, given his anaphylaxis. Will obtain blood work CBC CMP lipase. Will give IV fentanyl Zofran for symptoms. Will defer fluid resuscitation given that he is anuric. ED Course:   Initial assessment performed. The patients presenting problems have been discussed, and they are in agreement with the care plan formulated and outlined with them. I have encouraged them to ask questions as they arise throughout their visit.     ED Course as of 12/04/22 0400   Sun Dec 04, 2022   2944 No acute abnormality noted on CT of the abdomen blood counts are normal creatinine is 4.93 in setting of known ESRD on hemodialysis [WB]   0757 Moderate fecal stasis is noted [WB]   0354 Pain is resolved, abdominal exam is benign, findings discussed with patient will discharge with strict return precautions [WB]   0358 Doubt diverticulitis normal white count no evidence on noncontrasted scan of diverticulitis [WB]      ED Course User Index  [WB] Jigna Downing MD     Critical Care Time:   0    Disposition:  Discharge Note:  The patient has been re-evaluated and is ready for discharge. Reviewed available results with patient. Counseled patient on diagnosis and care plan. Patient has expressed understanding, and all questions have been answered. Patient agrees with plan and agrees to follow up as recommended, or to return to the ED if their symptoms worsen. Discharge instructions have been provided and explained to the patient, along with reasons to return to the ED. PLAN:  Current Discharge Medication List        CONTINUE these medications which have CHANGED    Details   dicyclomine (BENTYL) 20 mg tablet Take 1 Tablet by mouth every six (6) hours as needed for Abdominal Cramps. Qty: 20 Tablet, Refills: 0  Start date: 12/4/2022           2. Follow-up Information       Follow up With Specialties Details Why Mika Shaikh MD Gastroenterology   [address] [facility]  [address] [facility] [address]  [phone]            3. Return to ED if worse     Diagnosis     Clinical Impression:   1. Acute abdominal pain        Attestations:    Meghna Vivar M.D. Please note that this dictation was completed with [employer], the computer voice recognition software. Quite often unanticipated grammatical, syntax, homophones, and other interpretive errors are inadvertently transcribed by the computer software. Please disregard these errors. Please excuse any errors that have escaped final proofreading. Thank you.

## 2022-12-29 ENCOUNTER — HOSPITAL ENCOUNTER (EMERGENCY)
Age: 47
Discharge: HOME | End: 2022-12-29
Attending: EMERGENCY MEDICINE
Payer: MEDICARE

## 2022-12-29 ENCOUNTER — APPOINTMENT (OUTPATIENT)
Dept: CT IMAGING | Age: 47
End: 2022-12-29
Attending: EMERGENCY MEDICINE
Payer: MEDICARE

## 2022-12-29 VITALS
HEIGHT: 64 IN | HEART RATE: 77 BPM | OXYGEN SATURATION: 95 % | TEMPERATURE: 97.9 F | RESPIRATION RATE: 22 BRPM | WEIGHT: 133.6 LBS | BODY MASS INDEX: 22.81 KG/M2 | SYSTOLIC BLOOD PRESSURE: 119 MMHG | DIASTOLIC BLOOD PRESSURE: 83 MMHG

## 2022-12-29 DIAGNOSIS — R11.2 NAUSEA AND VOMITING, UNSPECIFIED VOMITING TYPE: ICD-10-CM

## 2022-12-29 DIAGNOSIS — R10.32 ABDOMINAL PAIN, LLQ (LEFT LOWER QUADRANT): Primary | ICD-10-CM

## 2022-12-29 LAB
ALBUMIN SERPL-MCNC: 3.3 G/DL (ref 3.5–5)
ALBUMIN/GLOB SERPL: 0.7 {RATIO} (ref 1.1–2.2)
ALP SERPL-CCNC: 412 U/L (ref 45–117)
ALT SERPL-CCNC: 14 U/L (ref 12–78)
ANION GAP SERPL CALC-SCNC: 8 MMOL/L (ref 5–15)
AST SERPL-CCNC: 18 U/L (ref 15–37)
BASOPHILS # BLD: 0.1 K/UL (ref 0–0.1)
BASOPHILS NFR BLD: 1 % (ref 0–1)
BILIRUB SERPL-MCNC: 0.4 MG/DL (ref 0.2–1)
BUN SERPL-MCNC: 47 MG/DL (ref 6–20)
BUN/CREAT SERPL: 6 (ref 12–20)
CALCIUM SERPL-MCNC: 8.7 MG/DL (ref 8.5–10.1)
CHLORIDE SERPL-SCNC: 98 MMOL/L (ref 97–108)
CO2 SERPL-SCNC: 28 MMOL/L (ref 21–32)
CREAT SERPL-MCNC: 8.41 MG/DL (ref 0.7–1.3)
DIFFERENTIAL METHOD BLD: ABNORMAL
EOSINOPHIL # BLD: 0.1 K/UL (ref 0–0.4)
EOSINOPHIL NFR BLD: 2 % (ref 0–7)
ERYTHROCYTE [DISTWIDTH] IN BLOOD BY AUTOMATED COUNT: 14.7 % (ref 11.5–14.5)
GLOBULIN SER CALC-MCNC: 5 G/DL (ref 2–4)
GLUCOSE SERPL-MCNC: 90 MG/DL (ref 65–100)
HCT VFR BLD AUTO: 41.1 % (ref 36.6–50.3)
HGB BLD-MCNC: 13.4 G/DL (ref 12.1–17)
IMM GRANULOCYTES # BLD AUTO: 0 K/UL (ref 0–0.04)
IMM GRANULOCYTES NFR BLD AUTO: 0 % (ref 0–0.5)
LIPASE SERPL-CCNC: 273 U/L (ref 73–393)
LYMPHOCYTES # BLD: 1.7 K/UL (ref 0.8–3.5)
LYMPHOCYTES NFR BLD: 20 % (ref 12–49)
MCH RBC QN AUTO: 31 PG (ref 26–34)
MCHC RBC AUTO-ENTMCNC: 32.6 G/DL (ref 30–36.5)
MCV RBC AUTO: 95.1 FL (ref 80–99)
MONOCYTES # BLD: 1 K/UL (ref 0–1)
MONOCYTES NFR BLD: 12 % (ref 5–13)
NEUTS SEG # BLD: 5.6 K/UL (ref 1.8–8)
NEUTS SEG NFR BLD: 65 % (ref 32–75)
NRBC # BLD: 0 K/UL (ref 0–0.01)
NRBC BLD-RTO: 0 PER 100 WBC
PLATELET # BLD AUTO: 187 K/UL (ref 150–400)
PMV BLD AUTO: 10.3 FL (ref 8.9–12.9)
POTASSIUM SERPL-SCNC: 3 MMOL/L (ref 3.5–5.1)
PROT SERPL-MCNC: 8.3 G/DL (ref 6.4–8.2)
RBC # BLD AUTO: 4.32 M/UL (ref 4.1–5.7)
SODIUM SERPL-SCNC: 134 MMOL/L (ref 136–145)
WBC # BLD AUTO: 8.6 K/UL (ref 4.1–11.1)

## 2022-12-29 PROCEDURE — 83690 ASSAY OF LIPASE: CPT

## 2022-12-29 PROCEDURE — 80053 COMPREHEN METABOLIC PANEL: CPT

## 2022-12-29 PROCEDURE — 74176 CT ABD & PELVIS W/O CONTRAST: CPT

## 2022-12-29 PROCEDURE — 74011250636 HC RX REV CODE- 250/636: Performed by: EMERGENCY MEDICINE

## 2022-12-29 PROCEDURE — 36415 COLL VENOUS BLD VENIPUNCTURE: CPT

## 2022-12-29 PROCEDURE — 99284 EMERGENCY DEPT VISIT MOD MDM: CPT

## 2022-12-29 PROCEDURE — 96374 THER/PROPH/DIAG INJ IV PUSH: CPT

## 2022-12-29 PROCEDURE — 85025 COMPLETE CBC W/AUTO DIFF WBC: CPT

## 2022-12-29 PROCEDURE — 96375 TX/PRO/DX INJ NEW DRUG ADDON: CPT

## 2022-12-29 RX ORDER — MORPHINE SULFATE 2 MG/ML
4 INJECTION, SOLUTION INTRAMUSCULAR; INTRAVENOUS
Status: COMPLETED | OUTPATIENT
Start: 2022-12-29 | End: 2022-12-29

## 2022-12-29 RX ORDER — ONDANSETRON 2 MG/ML
4 INJECTION INTRAMUSCULAR; INTRAVENOUS ONCE
Status: COMPLETED | OUTPATIENT
Start: 2022-12-29 | End: 2022-12-29

## 2022-12-29 RX ORDER — DICYCLOMINE HYDROCHLORIDE 20 MG/1
20 TABLET ORAL
Qty: 24 TABLET | Refills: 0 | Status: SHIPPED | OUTPATIENT
Start: 2022-12-29

## 2022-12-29 RX ORDER — ONDANSETRON 4 MG/1
4 TABLET, ORALLY DISINTEGRATING ORAL
Qty: 20 TABLET | Refills: 0 | Status: SHIPPED | OUTPATIENT
Start: 2022-12-29

## 2022-12-29 RX ADMIN — MORPHINE SULFATE 4 MG: 2 INJECTION, SOLUTION INTRAMUSCULAR; INTRAVENOUS at 06:16

## 2022-12-29 RX ADMIN — ONDANSETRON 4 MG: 2 INJECTION INTRAMUSCULAR; INTRAVENOUS at 06:14

## 2022-12-29 NOTE — ED NOTES
Bedside shift change report given to MANUELITO Neville (oncoming nurse) by Jessenia Ingram RN (offgoing nurse). Report included the following information SBAR, Kardex and ED Summary.

## 2022-12-29 NOTE — ED NOTES
I have reviewed discharge instructions with the patient. The patient verbalized understanding. Current Discharge Medication List      START taking these medications    Details   !! ondansetron (ZOFRAN ODT) 4 mg disintegrating tablet Take 1 Tablet by mouth every eight (8) hours as needed for Nausea or Vomiting. Qty: 20 Tablet, Refills: 0  Start date: 12/29/2022      !! dicyclomine (BENTYL) 20 mg tablet Take 1 Tablet by mouth every eight (8) hours as needed for Abdominal Cramps. Qty: 24 Tablet, Refills: 0  Start date: 12/29/2022       !! - Potential duplicate medications found. Please discuss with provider. CONTINUE these medications which have NOT CHANGED    Details   !! dicyclomine (BENTYL) 20 mg tablet Take 1 Tablet by mouth every six (6) hours as needed for Abdominal Cramps. Qty: 20 Tablet, Refills: 0      !! ondansetron (ZOFRAN ODT) 4 mg disintegrating tablet Take 1 Tablet by mouth every eight (8) hours as needed for Nausea or Vomiting. Qty: 10 Tablet, Refills: 0       !! - Potential duplicate medications found. Please discuss with provider.

## 2022-12-29 NOTE — DISCHARGE INSTRUCTIONS
You are seen in the ER for your symptoms. Please use medications as prescribed. Also recommend that you continue to take your MiraLAX at home. Please follow-up with your primary care doctor return for new or worsening symptoms anytime.

## 2022-12-29 NOTE — ED PROVIDER NOTES
EMERGENCY DEPARTMENT HISTORY AND PHYSICAL EXAM      Date: 12/29/2022  Patient Name: Priscilla Martinez    History of Presenting Illness     Chief Complaint   Patient presents with    Abdominal Pain     Abdominal pain with N/V starting at 7915, reports complicated GI hx with pancreatitis, gastroparesis, and ulcers. Patient states intermittently he experiences GI symptoms. Patient states he has vomited 3 times and that the emesis is green. Denies changes to BM, dialysis patient. Pain is 7/10, stabbing, and worst in LLQ       History Provided By: Patient    HPI: Priscilla Martinez, 41 y.o. male presents to the ED with cc of abdominal pain. 41 YOM with a PMH of ESRD on HD (last HD yesterday), previous appendectomy, cholecystectomy and bowel resection presents emerged department with left lower quadrant abdominal pain. Patient reports symptoms began at 1900 last night. Reports pain was moderate to severe and constant in the left lower quadrant. There are no alleviating or aggravating factors. Associated nausea and vomiting. Did have a bowel movement yesterday still passing flatus. Denies chest pain or shortness of breath. Denies fevers or chills. Patient is an uric. No other complaints at this time. There are no other complaints, changes, or physical findings at this time. PCP: Soham Schofield MD    No current facility-administered medications on file prior to encounter. Current Outpatient Medications on File Prior to Encounter   Medication Sig Dispense Refill    dicyclomine (BENTYL) 20 mg tablet Take 1 Tablet by mouth every six (6) hours as needed for Abdominal Cramps. 20 Tablet 0    aspirin delayed-release 81 mg tablet Take 1 Tablet by mouth daily. 30 Tablet 5    ondansetron (ZOFRAN ODT) 4 mg disintegrating tablet Take 1 Tablet by mouth every eight (8) hours as needed for Nausea or Vomiting.  (Patient not taking: Reported on 9/19/2022) 10 Tablet 0    sevelamer carbonate (RENVELA) 800 mg tab tab Take 1,600 mg by mouth three (3) times daily. ondansetron hcl (Zofran) 4 mg tablet Take 1 Tablet by mouth every twelve (12) hours as needed for Nausea or Vomiting. (Patient not taking: Reported on 9/19/2022) 20 Tablet 0    metoprolol succinate (TOPROL-XL) 25 mg XL tablet Take 12.5 mg by mouth daily. pantoprazole (PROTONIX) 40 mg tablet Take 1 Tablet by mouth Before breakfast and dinner. 60 Tablet 2    sucralfate (CARAFATE) 1 gram tablet Take 1 Tablet by mouth Before breakfast, lunch, dinner and at bedtime. (Patient not taking: Reported on 9/19/2022) 120 Tablet 2    polyethylene glycol (MIRALAX) 17 gram packet Take 1 Packet by mouth daily. (Patient not taking: Reported on 9/19/2022) 30 Packet 0    Vitamin D2 1,250 mcg (50,000 unit) capsule Take 50,000 Units by mouth every seven (7) days. calcium acetate,phosphat bind, (PHOSLO) 667 mg cap Take 2 Caps by mouth three (3) times daily (with meals). Indications: low amount of calcium in the blood, renal osteodystrophy with hyperphosphatemia 120 Cap 0    atorvastatin (LIPITOR) 20 mg tablet Take 20 mg by mouth daily. acetaminophen (TYLENOL) 500 mg tablet Take 1 Tab by mouth every four (4) hours as needed for Pain. Over the counter 30 Tab 0    calcitRIOL (ROCALTROL) 0.5 mcg capsule Take 0.5 mcg by mouth daily. Past History     Past Medical History:  Past Medical History:   Diagnosis Date    Abdominal hematoma     AICD (automatic cardioverter/defibrillator) present     CAD (coronary artery disease)     anterior MI s/p 2 stents  2007    Chronic abdominal pain     Chronic kidney disease     ESRD; Dialysis dependent.  [id number] home x5 days week M-F does labs    DVT (deep venous thrombosis) ([address] 2001    DVT of popliteal vein ([facility] [address] 11/2011    not anticoagulated due to bleeding, IVC filter    Endocarditis     Gallstone pancreatitis     Gastrointestinal disorder     acid reflux    Gastrointestinal disorder     peptic ulcer    Hemodialysis patient ([address]     High cholesterol     HIT (heparin-induced thrombocytopenia)     Hypertension     Kidney transplant     b/l kidneys 1995, 2001    Long term current use of anticoagulant therapy     Nephrotic syndrome     Other ill-defined conditions(799.89)     kidny transplant x2,  on dialysis    Other ill-defined conditions(799.89)     home dialysis    Other ill-defined conditions(799.89)     hx recurrent left leg DVT    Peritonitis ([address]     Seizures ([address] 2015    most recent- only had three in lifetime    Small bowel obstruction (HCC)     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    V-tach        Past Surgical History:  Past Surgical History:   Procedure Laterality Date    HX APPENDECTOMY      HX CHOLECYSTECTOMY      HX OTHER SURGICAL  11/2011    exploratory laparotomy    HX OTHER SURGICAL      fem-pop    HX OTHER SURGICAL  02/2013    right upper extremity fistula    HX PACEMAKER Left 6/2009    AICD-st latonya-not connected    HX PACEMAKER Left     AICD-left side-BS-working    HX SMALL BOWEL RESECTION      HX TRANSPLANT  2001     Kidney    HX TRANSPLANT  1992    kidney    HX VASCULAR ACCESS Right     femoral access for HD- does 5 day dialysis @ home/left upper arm graft active, right upper arm occluded    IR INSERT NON TUNL CVC OVER 5 YRS  12/7/2021    IR INSERT NON TUNL CVC OVER 5 YRS  12/10/2021    IR INSERT TIPS HEPATIC SHUNT  12/30/2021    IR REMOVE TUNL CVAD W/O PORT / PUMP  10/29/2020    IR REPLACE CVC TUNNELED W/O PORT  9/10/2020    ND CARDIAC SURG PROCEDURE UNLIST  2007    2 stents    ND EDG US EXAM SURGICAL ALTER STOM DUODENUM/JEJUNUM  7/15/2011         ND ESOPHAGOGASTRODUODENOSCOPY TRANSORAL DIAGNOSTIC  5/24/2012         UPPER GI ENDOSCOPY,CTRL BLEED  12/6/2021         UPPER GI ENDOSCOPY,DIAGNOSIS  12/8/2021         UPPER GI ENDOSCOPY,DIAGNOSIS  4/20/2022         VASCULAR SURGERY PROCEDURE UNLIST  11/14/2017    Insertion AV graft right upper arm (bovine); ligation of AV fistula right arm Family History:  Family History   Problem Relation Age of Onset    COPD Mother     Lung Disease Mother     Cancer Father         stomach    Cancer Maternal Grandmother        Social History:  Social History     Tobacco Use    Smoking status: Never    Smokeless tobacco: Never   Vaping Use    Vaping Use: Never used   Substance Use Topics    Alcohol use: No    Drug use: Yes     Types: Prescription, OTC       Allergies: Allergies   Allergen Reactions    Shellfish Containing Products Swelling     Break out in rash, trouble breathing    Contrast Agent [Iodine] Shortness of Breath    Heparin Analogues Other (comments)     Positive history of HIT         Review of Systems   Review of Systems   Constitutional:  Negative for fever. HENT:  Negative for voice change. Eyes:  Negative for pain and redness. Respiratory:  Negative for cough and chest tightness. Cardiovascular:  Negative for chest pain and leg swelling. Gastrointestinal:  Positive for abdominal pain, nausea and vomiting. Negative for diarrhea. Genitourinary:  Negative for hematuria. Musculoskeletal:  Negative for gait problem. Skin:  Negative for color change, pallor and rash. Neurological:  Negative for facial asymmetry, weakness and headaches. Hematological:  Does not bruise/bleed easily. Psychiatric/Behavioral:  Negative for behavioral problems. All other systems reviewed and are negative. Physical Exam   Physical Exam  Vitals and nursing note reviewed. Constitutional:       Comments: 79-year-old male, resting bed, no acute distress   HENT:      Head: Normocephalic and atraumatic. Nose: Nose normal.      Mouth/Throat:      Mouth: Mucous membranes are moist.   Eyes:      Pupils: Pupils are equal, round, and reactive to light. Cardiovascular:      Rate and Rhythm: Normal rate and regular rhythm. Pulses: Normal pulses. Heart sounds: No murmur heard. No friction rub. No gallop.    Pulmonary:      Effort: Pulmonary effort is normal.      Breath sounds: Normal breath sounds. No wheezing, rhonchi or rales. Abdominal:      General: Abdomen is flat. There is no distension. Palpations: Abdomen is soft. Tenderness: There is abdominal tenderness in the left lower quadrant. There is no guarding or rebound. Comments: Diffuse tenderness, most pronounced in the left lower quadrant, no guarding or rebound   Musculoskeletal:         General: Normal range of motion. Cervical back: Normal range of motion. Right lower leg: No edema. Left lower leg: No edema. Skin:     General: Skin is warm and dry. Capillary Refill: Capillary refill takes less than 2 seconds. Neurological:      General: No focal deficit present. Mental Status: He is alert. Psychiatric:         Mood and Affect: Mood normal.       Diagnostic Study Results     Labs -     No results found for this or any previous visit (from the past 12 hour(s)). Radiologic Studies -   CT ABD PELV WO CONT   Final Result      1. No acute abdominal or pelvic pathology. No significant change. 2. Moderate generalized constipation. 3. Moderate right pleural effusion with right basilar atelectasis. 4. Additional incidental findings, unchanged. CT Results  (Last 48 hours)                 12/29/22 0653  CT ABD PELV WO CONT Final result    Impression:      1. No acute abdominal or pelvic pathology. No significant change. 2. Moderate generalized constipation. 3. Moderate right pleural effusion with right basilar atelectasis. 4. Additional incidental findings, unchanged. Narrative:  EXAM: CT ABD PELV WO CONT       INDICATION: LLQ pain, h/o ESRD and previous bowel resection, + N/V       COMPARISON: CT 12/4/2022       IV CONTRAST: None. ORAL CONTRAST: Not given       TECHNIQUE:    Thin axial images were obtained through the abdomen and pelvis. Coronal and   sagittal reformats were generated.  CT dose reduction was achieved through use of   a standardized protocol tailored for this examination and automatic exposure   control for dose modulation. The absence of intravenous contrast material reduces the sensitivity for   evaluation of the vasculature and solid organs. FINDINGS:    LOWER THORAX: Chronic complex moderate left pleural effusion with dense   opacification of much of the left lower lobe and portions of the right middle   lobe, similar to prior study. Severe calcific coronary artery atherosclerosis. Pacer device overlies the left lower lateral chest wall. LIVER: No mass. BILIARY TREE: Status post cholecystectomy. CBD is not dilated. SPLEEN: within normal limits. PANCREAS: No focal abnormality. ADRENALS: Unremarkable. KIDNEYS/URETERS: Atrophic kidneys   STOMACH: Unremarkable. SMALL BOWEL: No dilatation or wall thickening. COLON: Moderate generalized constipation   APPENDIX: Not visualized   PERITONEUM: Calcified mesenteric mass in the midline, not significantly changed   RETROPERITONEUM: Severe diffuse calcific aortic atherosclerosis without   aneurysm. REPRODUCTIVE ORGANS: Prostate is noted   URINARY BLADDER: Decompressed   BONES: No destructive bone lesion. ABDOMINAL WALL: Chronic seroma in the left inguinal location. ADDITIONAL COMMENTS: Renal osteodystrophy throughout the spine                 CXR Results  (Last 48 hours)      None            Medical Decision Making   I am the first provider for this patient. I reviewed the vital signs, available nursing notes, past medical history, past surgical history, family history and social history. Vital Signs-Reviewed the patient's vital signs.   Patient Vitals for the past 12 hrs:   Pulse Resp BP SpO2   12/29/22 0650 77 22 119/83 95 %   12/29/22 0635 79 20 117/81 97 %   12/29/22 0603 79 24 117/81 100 %   12/29/22 0548 77 20 110/86 100 %   12/29/22 0530 80 -- 115/83 99 %   12/29/22 0523 -- -- -- 96 %     Records Reviewed: Nursing Notes and Old Medical Records    Provider Notes (Medical Decision Making):     29-year-old male presents emergency department chief complaint of left lower quadrant abdominal pain x12 hours. Vital signs are unremarkable. Abdominal exam with left lower quadrant tenderness without peritonitis. Basic labs consistent with ESRD but otherwise unremarkable. Does report nausea and vomiting. Will check CT to exclude intra-abdominal pathology given patient's surgical history, I do not palpate a hernia but must exclude small bowel obstruction. ED Course:   Initial assessment performed. The patients presenting problems have been discussed, and they are in agreement with the care plan formulated and outlined with them. I have encouraged them to ask questions as they arise throughout their visit. ED Course as of 12/29/22 1448   Thu Dec 29, 2022   0621 CBC is unremarkable, CMP is unremarkable. Lipase is reassuring. [MB]   0710 CT with stool burden, no other acute pathology, incidental pleural effusion noted and unchanged from prior CTs. [MB]   [id number] Reassessed patient, pain significantly improved. Will discharge with symptomatic care, advised to start OBR, patient has miralax at home. Zofran and bentyl PRN with return precautions. [MB]      ED Course User Index  [MB] MD Hanh Rocha MD      Disposition:    Discharged    DISCHARGE PLAN:  1. Discharge Medication List as of 12/29/2022  7:17 AM        START taking these medications    Details   !! ondansetron (ZOFRAN ODT) 4 mg disintegrating tablet Take 1 Tablet by mouth every eight (8) hours as needed for Nausea or Vomiting., Normal, Disp-20 Tablet, R-0      !! dicyclomine (BENTYL) 20 mg tablet Take 1 Tablet by mouth every eight (8) hours as needed for Abdominal Cramps., Normal, Disp-24 Tablet, R-0       !! - Potential duplicate medications found. Please discuss with provider.         CONTINUE these medications which have NOT CHANGED    Details !! dicyclomine (BENTYL) 20 mg tablet Take 1 Tablet by mouth every six (6) hours as needed for Abdominal Cramps., Normal, Disp-20 Tablet, R-0      aspirin delayed-release 81 mg tablet Take 1 Tablet by mouth daily. , Normal, Disp-30 Tablet, R-5      !! ondansetron (ZOFRAN ODT) 4 mg disintegrating tablet Take 1 Tablet by mouth every eight (8) hours as needed for Nausea or Vomiting., Normal, Disp-10 Tablet, R-0      sevelamer carbonate (RENVELA) 800 mg tab tab Take 1,600 mg by mouth three (3) times daily. , Historical Med      ondansetron hcl (Zofran) 4 mg tablet Take 1 Tablet by mouth every twelve (12) hours as needed for Nausea or Vomiting., Normal, Disp-20 Tablet, R-0      metoprolol succinate (TOPROL-XL) 25 mg XL tablet Take 12.5 mg by mouth daily. , Historical Med      pantoprazole (PROTONIX) 40 mg tablet Take 1 Tablet by mouth Before breakfast and dinner., Normal, Disp-60 Tablet, R-2      sucralfate (CARAFATE) 1 gram tablet Take 1 Tablet by mouth Before breakfast, lunch, dinner and at bedtime., Normal, Disp-120 Tablet, R-2      polyethylene glycol (MIRALAX) 17 gram packet Take 1 Packet by mouth daily. , Normal, Disp-30 Packet, R-0      Vitamin D2 1,250 mcg (50,000 unit) capsule Take 50,000 Units by mouth every seven (7) days. , Historical Med, KIMBERLY      calcium acetate,phosphat bind, (PHOSLO) 667 mg cap Take 2 Caps by mouth three (3) times daily (with meals). Indications: low amount of calcium in the blood, renal osteodystrophy with hyperphosphatemia, Normal, Disp-120 Cap,R-0      atorvastatin (LIPITOR) 20 mg tablet Take 20 mg by mouth daily. , Historical Med      acetaminophen (TYLENOL) 500 mg tablet Take 1 Tab by mouth every four (4) hours as needed for Pain. Over the counter, No Print, Disp-30 Tab,R-0      calcitRIOL (ROCALTROL) 0.5 mcg capsule Take 0.5 mcg by mouth daily. , Historical Med       !! - Potential duplicate medications found. Please discuss with provider.         2.   Follow-up Information       Follow up With Specialties Details Why Contact Info    Sally Wise MD Family Medicine In 3 days  [facility]  [phone]      [facility] EMERGENCY DEPT Emergency Medicine  If symptoms worsen [address]  [address]   [address] [address] [phone]          3. Return to ED if worse     Diagnosis     Clinical Impression:   1. Abdominal pain, LLQ (left lower quadrant)    2. Nausea and vomiting, unspecified vomiting type        Attestations:    Keyon Courtney MD        Please note that this dictation was completed with ROOOMERS, the computer voice recognition software. Quite often unanticipated grammatical, syntax, homophones, and other interpretive errors are inadvertently transcribed by the computer software. Please disregard these errors. Please excuse any errors that have escaped final proofreading. Thank you.

## 2022-12-31 ENCOUNTER — HOSPITAL ENCOUNTER (EMERGENCY)
Age: 47
Discharge: HOME | End: 2022-12-31
Attending: STUDENT IN AN ORGANIZED HEALTH CARE EDUCATION/TRAINING PROGRAM
Payer: MEDICARE

## 2022-12-31 ENCOUNTER — APPOINTMENT (OUTPATIENT)
Dept: ULTRASOUND IMAGING | Age: 47
End: 2022-12-31
Attending: PHYSICIAN ASSISTANT
Payer: MEDICARE

## 2022-12-31 ENCOUNTER — APPOINTMENT (OUTPATIENT)
Dept: CT IMAGING | Age: 47
End: 2022-12-31
Attending: STUDENT IN AN ORGANIZED HEALTH CARE EDUCATION/TRAINING PROGRAM
Payer: MEDICARE

## 2022-12-31 VITALS
SYSTOLIC BLOOD PRESSURE: 125 MMHG | WEIGHT: 129.41 LBS | RESPIRATION RATE: 18 BRPM | DIASTOLIC BLOOD PRESSURE: 84 MMHG | BODY MASS INDEX: 20.31 KG/M2 | OXYGEN SATURATION: 100 % | HEIGHT: 67 IN | TEMPERATURE: 98.2 F | HEART RATE: 87 BPM

## 2022-12-31 DIAGNOSIS — R10.84 ABDOMINAL PAIN, GENERALIZED: Primary | ICD-10-CM

## 2022-12-31 DIAGNOSIS — K59.00 CONSTIPATION, UNSPECIFIED CONSTIPATION TYPE: ICD-10-CM

## 2022-12-31 LAB
ALBUMIN SERPL-MCNC: 3.7 G/DL (ref 3.5–5)
ALBUMIN/GLOB SERPL: 0.7 {RATIO} (ref 1.1–2.2)
ALP SERPL-CCNC: 429 U/L (ref 45–117)
ALT SERPL-CCNC: 17 U/L (ref 12–78)
ANION GAP SERPL CALC-SCNC: 7 MMOL/L (ref 5–15)
AST SERPL-CCNC: 22 U/L (ref 15–37)
BILIRUB SERPL-MCNC: 0.5 MG/DL (ref 0.2–1)
BUN SERPL-MCNC: 37 MG/DL (ref 6–20)
BUN/CREAT SERPL: 6 (ref 12–20)
CALCIUM SERPL-MCNC: 9.7 MG/DL (ref 8.5–10.1)
CHLORIDE SERPL-SCNC: 97 MMOL/L (ref 97–108)
CO2 SERPL-SCNC: 31 MMOL/L (ref 21–32)
CREAT SERPL-MCNC: 6.58 MG/DL (ref 0.7–1.3)
ERYTHROCYTE [DISTWIDTH] IN BLOOD BY AUTOMATED COUNT: 14.5 % (ref 11.5–14.5)
GLOBULIN SER CALC-MCNC: 5.1 G/DL (ref 2–4)
GLUCOSE SERPL-MCNC: 87 MG/DL (ref 65–100)
HCT VFR BLD AUTO: 45.3 % (ref 36.6–50.3)
HGB BLD-MCNC: 14.9 G/DL (ref 12.1–17)
LIPASE SERPL-CCNC: 133 U/L (ref 73–393)
MCH RBC QN AUTO: 31.2 PG (ref 26–34)
MCHC RBC AUTO-ENTMCNC: 32.9 G/DL (ref 30–36.5)
MCV RBC AUTO: 94.8 FL (ref 80–99)
NRBC # BLD: 0 K/UL (ref 0–0.01)
NRBC BLD-RTO: 0 PER 100 WBC
PLATELET # BLD AUTO: 180 K/UL (ref 150–400)
PMV BLD AUTO: 11.5 FL (ref 8.9–12.9)
POTASSIUM SERPL-SCNC: 3.5 MMOL/L (ref 3.5–5.1)
PROT SERPL-MCNC: 8.8 G/DL (ref 6.4–8.2)
RBC # BLD AUTO: 4.78 M/UL (ref 4.1–5.7)
SODIUM SERPL-SCNC: 135 MMOL/L (ref 136–145)
WBC # BLD AUTO: 8.8 K/UL (ref 4.1–11.1)

## 2022-12-31 PROCEDURE — 96374 THER/PROPH/DIAG INJ IV PUSH: CPT

## 2022-12-31 PROCEDURE — 96375 TX/PRO/DX INJ NEW DRUG ADDON: CPT

## 2022-12-31 PROCEDURE — 74176 CT ABD & PELVIS W/O CONTRAST: CPT

## 2022-12-31 PROCEDURE — 80053 COMPREHEN METABOLIC PANEL: CPT

## 2022-12-31 PROCEDURE — 76705 ECHO EXAM OF ABDOMEN: CPT

## 2022-12-31 PROCEDURE — 74011250636 HC RX REV CODE- 250/636: Performed by: STUDENT IN AN ORGANIZED HEALTH CARE EDUCATION/TRAINING PROGRAM

## 2022-12-31 PROCEDURE — 85027 COMPLETE CBC AUTOMATED: CPT

## 2022-12-31 PROCEDURE — 36415 COLL VENOUS BLD VENIPUNCTURE: CPT

## 2022-12-31 PROCEDURE — 99284 EMERGENCY DEPT VISIT MOD MDM: CPT

## 2022-12-31 PROCEDURE — 74011250637 HC RX REV CODE- 250/637: Performed by: STUDENT IN AN ORGANIZED HEALTH CARE EDUCATION/TRAINING PROGRAM

## 2022-12-31 PROCEDURE — 83690 ASSAY OF LIPASE: CPT

## 2022-12-31 RX ORDER — FENTANYL CITRATE 50 UG/ML
25 INJECTION, SOLUTION INTRAMUSCULAR; INTRAVENOUS ONCE
Status: COMPLETED | OUTPATIENT
Start: 2022-12-31 | End: 2022-12-31

## 2022-12-31 RX ORDER — ONDANSETRON 2 MG/ML
4 INJECTION INTRAMUSCULAR; INTRAVENOUS
Status: COMPLETED | OUTPATIENT
Start: 2022-12-31 | End: 2022-12-31

## 2022-12-31 RX ORDER — ACETAMINOPHEN 325 MG/1
975 TABLET ORAL
Status: COMPLETED | OUTPATIENT
Start: 2022-12-31 | End: 2022-12-31

## 2022-12-31 RX ADMIN — ACETAMINOPHEN 975 MG: 325 TABLET ORAL at 09:42

## 2022-12-31 RX ADMIN — ONDANSETRON 4 MG: 2 INJECTION INTRAMUSCULAR; INTRAVENOUS at 09:42

## 2022-12-31 RX ADMIN — FENTANYL CITRATE 25 MCG: 50 INJECTION, SOLUTION INTRAMUSCULAR; INTRAVENOUS at 13:50

## 2022-12-31 NOTE — ED PROVIDER NOTES
EMERGENCY DEPARTMENT HISTORY AND PHYSICAL EXAM      Date: 12/31/2022  Patient Name: Derrick Cho    History of Presenting Illness     Chief Complaint   Patient presents with    Abdominal Pain     Since 11 pm last night with N/V. Pt is a 5 day a week home hemodialysis pt. Does not make urine     History Provided By: Patient    HPI: Derrick Cho, 41 y.o. male with a past medical history significant for medical problems as stated below presents to the ED with cc of abdominal pain with nausea and vomiting. He reports that this began suddenly at 11:00 last night. He has been performing hemodialysis at home with no issues. Reports that his pain is left-sided without radiating component. He states it is similar to the pain that he has had previously when he has been in the emergency department. He reports that he was here few days ago and was given dicyclomine, but this has not been helping. There are no other associated symptoms. No other exacerbating or ameliorating factors. He has an extensive GI history with pancreatitis, gastroparesis, and ulcers. He frequently has GI symptoms. He reports that he has had intra-abdominal surgery before. Patient has had at least 8 CTs performed at this facility in the past year that have been unremarkable for acute changes. They have noted a right-sided pleural effusion with atelectasis. Almost all have noted a significant amount of constipation. PCP: Rossie Prader, MD    No current facility-administered medications on file prior to encounter. Current Outpatient Medications on File Prior to Encounter   Medication Sig Dispense Refill    ondansetron (ZOFRAN ODT) 4 mg disintegrating tablet Take 1 Tablet by mouth every eight (8) hours as needed for Nausea or Vomiting. 20 Tablet 0    dicyclomine (BENTYL) 20 mg tablet Take 1 Tablet by mouth every eight (8) hours as needed for Abdominal Cramps.  24 Tablet 0    dicyclomine (BENTYL) 20 mg tablet Take 1 Tablet by mouth every six (6) hours as needed for Abdominal Cramps. 20 Tablet 0    aspirin delayed-release 81 mg tablet Take 1 Tablet by mouth daily. 30 Tablet 5    ondansetron (ZOFRAN ODT) 4 mg disintegrating tablet Take 1 Tablet by mouth every eight (8) hours as needed for Nausea or Vomiting. (Patient not taking: Reported on 9/19/2022) 10 Tablet 0    sevelamer carbonate (RENVELA) 800 mg tab tab Take 1,600 mg by mouth three (3) times daily. ondansetron hcl (Zofran) 4 mg tablet Take 1 Tablet by mouth every twelve (12) hours as needed for Nausea or Vomiting. (Patient not taking: Reported on 9/19/2022) 20 Tablet 0    metoprolol succinate (TOPROL-XL) 25 mg XL tablet Take 12.5 mg by mouth daily. pantoprazole (PROTONIX) 40 mg tablet Take 1 Tablet by mouth Before breakfast and dinner. 60 Tablet 2    sucralfate (CARAFATE) 1 gram tablet Take 1 Tablet by mouth Before breakfast, lunch, dinner and at bedtime. (Patient not taking: Reported on 9/19/2022) 120 Tablet 2    polyethylene glycol (MIRALAX) 17 gram packet Take 1 Packet by mouth daily. (Patient not taking: Reported on 9/19/2022) 30 Packet 0    Vitamin D2 1,250 mcg (50,000 unit) capsule Take 50,000 Units by mouth every seven (7) days. calcium acetate,phosphat bind, (PHOSLO) 667 mg cap Take 2 Caps by mouth three (3) times daily (with meals). Indications: low amount of calcium in the blood, renal osteodystrophy with hyperphosphatemia 120 Cap 0    atorvastatin (LIPITOR) 20 mg tablet Take 20 mg by mouth daily. acetaminophen (TYLENOL) 500 mg tablet Take 1 Tab by mouth every four (4) hours as needed for Pain. Over the counter 30 Tab 0    calcitRIOL (ROCALTROL) 0.5 mcg capsule Take 0.5 mcg by mouth daily.          Past History     Past Medical History:  Past Medical History:   Diagnosis Date    Abdominal hematoma     AICD (automatic cardioverter/defibrillator) present     CAD (coronary artery disease)     anterior MI s/p 2 stents  2007    Chronic abdominal pain Chronic kidney disease     ESRD; Dialysis dependent.  Live [address] home x5 days week M-F does labs    DVT (deep venous thrombosis) ([facility] [address] 2001    DVT of popliteal vein ([address] 11/2011    not anticoagulated due to bleeding, IVC filter    Endocarditis     Gallstone pancreatitis     Gastrointestinal disorder     acid reflux    Gastrointestinal disorder     peptic ulcer    Hemodialysis patient ([address]     High cholesterol     HIT (heparin-induced thrombocytopenia)     Hypertension     Kidney transplant     b/l kidneys 1995, 2001    Long term current use of anticoagulant therapy     Nephrotic syndrome     Other ill-defined conditions(799.89)     kidny transplant x2,  on dialysis    Other ill-defined conditions(799.89)     home dialysis    Other ill-defined conditions(799.89)     hx recurrent left leg DVT    Peritonitis ([facility] [address]     Seizures ([facility] [address] 2015    most recent- only had three in lifetime    Small bowel obstruction ([facility])     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[facility] [address]     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    V-tach        Past Surgical History:  Past Surgical History:   Procedure Laterality Date    HX APPENDECTOMY      HX CHOLECYSTECTOMY      HX OTHER SURGICAL  11/2011    exploratory laparotomy    HX OTHER SURGICAL      fem-pop    HX OTHER SURGICAL  02/2013    right upper extremity fistula    HX PACEMAKER Left 6/2009    AICD-st latonya-not connected    HX PACEMAKER Left     AICD-left side-BS-working    HX SMALL BOWEL RESECTION      HX TRANSPLANT  2001     Kidney    HX TRANSPLANT  1992    kidney    HX VASCULAR ACCESS Right     femoral access for HD- does 5 day dialysis @ home/left upper arm graft active, right upper arm occluded    IR INSERT NON TUNL CVC OVER 5 YRS  12/7/2021    IR INSERT NON TUNL CVC OVER 5 YRS  12/10/2021    IR INSERT TIPS HEPATIC SHUNT  12/30/2021    IR REMOVE TUNL CVAD W/O PORT / PUMP  10/29/2020    IR REPLACE CVC TUNNELED W/O PORT  9/10/2020    MI CARDIAC SURG PROCEDURE UNLIST  2007    2 stents    MI EDG US EXAM SURGICAL ALTER STOM DUODENUM/JEJUNUM  7/15/2011         NM ESOPHAGOGASTRODUODENOSCOPY TRANSORAL DIAGNOSTIC  5/24/2012         UPPER GI ENDOSCOPY,CTRL BLEED  12/6/2021         UPPER GI ENDOSCOPY,DIAGNOSIS  12/8/2021         UPPER GI ENDOSCOPY,DIAGNOSIS  4/20/2022         VASCULAR SURGERY PROCEDURE UNLIST  11/14/2017    Insertion AV graft right upper arm (bovine); ligation of AV fistula right arm       Family History:  Family History   Problem Relation Age of Onset    COPD Mother     Lung Disease Mother     Cancer Father         stomach    Cancer Maternal Grandmother        Social History:  Social History     Tobacco Use    Smoking status: Never    Smokeless tobacco: Never   Vaping Use    Vaping Use: Never used   Substance Use Topics    Alcohol use: No    Drug use: Yes     Types: Prescription, OTC       Allergies: Allergies   Allergen Reactions    Shellfish Containing Products Swelling     Break out in rash, trouble breathing    Contrast Agent [Iodine] Shortness of Breath    Heparin Analogues Other (comments)     Positive history of HIT         Review of Systems   Review of Systems   Constitutional:  Negative for activity change, chills and fever. HENT:  Negative for congestion, rhinorrhea and sneezing. Eyes:  Negative for discharge. Respiratory:  Negative for cough and shortness of breath. Cardiovascular:  Negative for chest pain. Gastrointestinal:  Positive for abdominal pain, nausea and vomiting. Negative for diarrhea. Genitourinary:         Does not make urine   Musculoskeletal:  Negative for arthralgias. Skin:  Negative for rash and wound. Neurological:  Negative for dizziness and weakness. Psychiatric/Behavioral:  Negative for agitation. Physical Exam   Physical Exam  Constitutional:       General: He is not in acute distress. Appearance: Normal appearance. He is not toxic-appearing. HENT:      Head: Normocephalic and atraumatic.       Mouth/Throat:      Mouth: Mucous membranes are moist.   Eyes:      Conjunctiva/sclera: Conjunctivae normal.      Pupils: Pupils are equal, round, and reactive to light. Cardiovascular:      Rate and Rhythm: Normal rate and regular rhythm. Pulmonary:      Effort: Pulmonary effort is normal.      Breath sounds: Normal breath sounds. Abdominal:      General: Abdomen is flat. There is no distension. Palpations: Abdomen is soft. Tenderness: There is abdominal tenderness. There is no guarding or rebound. Hernia: No hernia is present. Comments: Tenderness diffusely, most in LLQ   Musculoskeletal:         General: No swelling. Normal range of motion. Cervical back: Normal range of motion. Comments: LUE fistula with thrill   Skin:     General: Skin is warm and dry. Capillary Refill: Capillary refill takes less than 2 seconds. Neurological:      General: No focal deficit present. Mental Status: He is alert and oriented to person, place, and time.    Psychiatric:         Mood and Affect: Mood normal.     Diagnostic Study Results     Labs -  Recent Results (from the past 24 hour(s))   CBC W/O DIFF    Collection Time: 12/31/22  7:58 AM   Result Value Ref Range    WBC 8.8 4.1 - 11.1 K/uL    RBC 4.78 4.10 - 5.70 M/uL    HGB 14.9 12.1 - 17.0 g/dL    HCT 45.3 36.6 - 50.3 %    MCV 94.8 80.0 - 99.0 FL    MCH 31.2 26.0 - 34.0 PG    MCHC 32.9 30.0 - 36.5 g/dL    RDW 14.5 11.5 - 14.5 %    PLATELET 016 358 - 244 K/uL    MPV 11.5 8.9 - 12.9 FL    NRBC 0.0 0  WBC    ABSOLUTE NRBC 0.00 0.00 - 7.08 K/uL   METABOLIC PANEL, COMPREHENSIVE    Collection Time: 12/31/22  7:58 AM   Result Value Ref Range    Sodium 135 (L) 136 - 145 mmol/L    Potassium 3.5 3.5 - 5.1 mmol/L    Chloride 97 97 - 108 mmol/L    CO2 31 21 - 32 mmol/L    Anion gap 7 5 - 15 mmol/L    Glucose 87 65 - 100 mg/dL    BUN 37 (H) 6 - 20 MG/DL    Creatinine 6.58 (H) 0.70 - 1.30 MG/DL    BUN/Creatinine ratio 6 (L) 12 - 20      eGFR 10 (L) >60 ml/min/1.73m2    Calcium 9.7 8.5 - 10.1 MG/DL    Bilirubin, total 0.5 0.2 - 1.0 MG/DL    ALT (SGPT) 17 12 - 78 U/L    AST (SGOT) 22 15 - 37 U/L    Alk. phosphatase 429 (H) 45 - 117 U/L    Protein, total 8.8 (H) 6.4 - 8.2 g/dL    Albumin 3.7 3.5 - 5.0 g/dL    Globulin 5.1 (H) 2.0 - 4.0 g/dL    A-G Ratio 0.7 (L) 1.1 - 2.2     LIPASE    Collection Time: 12/31/22  7:58 AM   Result Value Ref Range    Lipase 133 73 - 393 U/L       Radiologic Studies -   US ABD LTD   Final Result   Rim calcified mass of mesentery measuring up to 3.1 cm in size   without internal flow demonstrated. No aortic aneurysm demonstrated. Extensive   obscuration by overlying bowel gas. Superior mesenteric and celiac arteries were   not identified. CT ABD PELV WO CONT   Final Result   1. Stable peripherally calcified lesions in upper mesentery and adjacent to   left external iliac artery, potentially representing aneurysms. Dedicated CTA   evaluation is recommended. 2.  Cirrhotic liver morphology. 3.  Stable complex chronic left pleural effusion. 4.  Other incidental findings as above. MRA ABD WO CONT    (Results Pending)     CT Results  (Last 48 hours)                 12/31/22 1201  CT ABD PELV WO CONT Final result    Impression:  1. Stable peripherally calcified lesions in upper mesentery and adjacent to   left external iliac artery, potentially representing aneurysms. Dedicated CTA   evaluation is recommended. 2.  Cirrhotic liver morphology. 3.  Stable complex chronic left pleural effusion. 4.  Other incidental findings as above. Narrative:  EXAM: CT ABD PELV WO CONT       INDICATION: LLQ pain, second visit in few days        COMPARISON: CT abdomen pelvis from 12/29/2022       IV CONTRAST: None. ORAL CONTRAST: None       TECHNIQUE:    Thin axial images were obtained through the abdomen and pelvis. Coronal and   sagittal reformats were generated.  CT dose reduction was achieved through use of   a standardized protocol tailored for this examination and automatic exposure   control for dose modulation. The absence of intravenous contrast material reduces the sensitivity for   evaluation of the vasculature and solid organs. FINDINGS:    LOWER THORAX: Chronic right pleural effusion with pleural thickening, moderate   size is not significant changed. Right basilar groundglass and consolidative   opacities may be atelectasis/scarring. Cardiomegaly. Severe coronary   calcifications. LIVER: Morphologic changes of the liver. Nodular contours. No masses. BILIARY TREE: Gallbladder is within normal limits. CBD is not dilated. SPLEEN: within normal limits. PANCREAS: No focal abnormality. ADRENALS: Unremarkable. KIDNEYS/URETERS: Bilateral atrophic, calcified renal transplants. Native kidneys   are severely atrophic. Simple right renal cyst, no follow-up required. STOMACH: Unremarkable. SMALL BOWEL: No dilatation or wall thickening. COLON: No dilatation or wall thickening. APPENDIX: Unremarkable. PERITONEUM: No ascites or pneumoperitoneum. Stable 2.3 x 3.3 cm peripherally   calcified lesion in the upper abdomen, 301-38. Peripherally calcified 1.4 x 2.0   cm stable focus near left external iliac artery, 301-60. RETROPERITONEUM: No lymphadenopathy or aortic aneurysm. Severe atherosclerosis. Left external iliac artery stent. IVC filter. REPRODUCTIVE ORGANS: Prostate and seminal vesicles are unremarkable. URINARY BLADDER: No mass or calculus. BONES: Dense appearance of the bones consistent with renal osseous dystrophy. ABDOMINAL WALL: No mass or hernia. ADDITIONAL COMMENTS: N/A                 CXR Results  (Last 48 hours)      None              Medical Decision Making   I am the first provider for this patient. I reviewed the vital signs, available nursing notes, past medical history, past surgical history, family history and social history. Vital Signs-Reviewed the patient's vital signs.   Patient Vitals for the past 12 hrs:   Temp Pulse Resp BP SpO2   12/31/22 0733 98.2 °F (36.8 °C) 87 18 125/84 100 %     Records Reviewed: Nursing records and medical records reviewed    Medical Decision Making  Number of Diagnoses or Management Options  Abdominal pain, generalized  Diagnosis management comments: Patient presents the emergency department with acute abdominal pain. He has had multiple episodes similar to this over the past year. He does have significant intra-abdominal pathology. Reviewed patient's recent CT scans have noted that he has has pretty significant stool burden on all of them, with no obstruction. He has not had any acute findings on the CTs that were performed over the past year. His labs obtained in triage were unremarkable. With his nonperitoneal exam I do not feel that he needs repeat CT scan at this time. Will medicate with p.o. Tylenol and IV Zofran. I discussed with the patient that I do not think that IV opioids are helping the issue. He states that he has a GI provider, but his next appointment is not till February. Will recommend earlier follow-up to address this. Patient repeatedly requesting fentanyl for his pain. He states that he does not have a pain management specialist, but I suggested that I think he needs one. I think with his significant constipation that has been seen previously IV opioids will only make this issue worse. We will reassess the patient following medications given. ED Course:   Initial assessment performed. The patients presenting problems have been discussed, and they are in agreement with the care plan formulated and outlined with them. I have encouraged them to ask questions as they arise throughout their visit. ED Course as of 12/31/22 1643   Sat Dec 31, 2022   1134 Patient re-evaluated. No improvement in abdominal pain.   Again abdominal pain non-peritoneal.  Seems to wince even before pressing on abdomen, but given history will re-evaluate with CT of abdomen and if normal will plan for discharge. [WB]   8500 I received a message from the MRI technologist on behalf of Dr. Adry Underwood that due to the patient's pacemaker he cannot receive an MRI at this time. Per the MRI technologist, the pacemaker card would need to be obtained from the patient's cardiologist and likely the earliest the MRI could be obtained would be in 3 days. [AJ]   4886 CTA recommended to evaluate for possible aneurysm, but is not possible with contrast allergy. MRI will not be able to be conducted acutely. US was ordered for further evaluation of vasculature since MRI and CTA were unable to be obtained. This showed a rim-enhancing mass that corresponds with calcified lesions seen on CT imaging. There is no internal flow noted making chance or aneurysm much lower. Patient re-evaluated and abdomen exam with no tenderness and he states pain resolved with IV fentanyl. He states that he does not have pulses of left foot at baseline since his recent bypass procedure and both feet are warm with good sensation. Will plan for discharge with followup with multiple specialists including vascular surgery to help arrange possible outpatient MRI, GI to evaluate chronic abdominal pain, and pain management specialists. We discussed customary return precautions. [WB]      ED Course User Index  [AJ] MONICA Butler  [WB] Susannah Lund MD     Medications Administered       acetaminophen (TYLENOL) tablet 975 mg       Admin Date  12/31/2022 Action  Given Dose  975 mg Route  Oral Administered By  Ileana Ortiz RN              ondansetron [facility]) injection 4 mg       Admin Date  12/31/2022 Action  Given Dose  4 mg Route  IntraVENous Administered By  Ileana Ortiz RN                  Critical Care:  None    Disposition:  Home    DISCHARGE PLAN:  1. Current Discharge Medication List        2.    Follow-up Information       Follow up With Specialties Details Why Contact Info    Keyanna Valentine MD [facility] Schedule an appointment as soon as possible for a visit   [address] [address]  [phone]      [facility] EMERGENCY DEPT Emergency Medicine  As needed, If symptoms worsen [address] [facility] [address]    Savita Duran MD Gastroenterology Schedule an appointment as soon as possible for a visit   [address]  [phone]      Vascular [address]  Vascular Surgery Schedule an appointment as soon as possible for a visit   [address]  [address] [facility]  [phone]          3. Return to ED if worse     Diagnosis     Clinical Impression:   1. Abdominal pain, generalized    2. Constipation, unspecified constipation type        Attestations:    Deyanira Robles MD    Please note that this dictation was completed with [employer], the computer voice recognition software. Quite often unanticipated grammatical, syntax, homophones, and other interpretive errors are inadvertently transcribed by the computer software. Please disregard these errors. Please excuse any errors that have escaped final proofreading. Thank you.

## 2022-12-31 NOTE — PROGRESS NOTES
-Please complete MRI History and Safety Screening Form   - Patient cannot be scanned until this form is completed, including signatures, and reviewed in MRI to ensure patient is SAFE and eligible for MRI. - CALL MRI when this has been successfully completed at [phone].

## 2023-02-28 ENCOUNTER — APPOINTMENT (OUTPATIENT)
Dept: GENERAL RADIOLOGY | Age: 48
End: 2023-02-28
Attending: STUDENT IN AN ORGANIZED HEALTH CARE EDUCATION/TRAINING PROGRAM
Payer: MEDICARE

## 2023-02-28 ENCOUNTER — HOSPITAL ENCOUNTER (EMERGENCY)
Age: 48
Discharge: HOME | End: 2023-02-28
Attending: STUDENT IN AN ORGANIZED HEALTH CARE EDUCATION/TRAINING PROGRAM
Payer: MEDICARE

## 2023-02-28 VITALS
SYSTOLIC BLOOD PRESSURE: 149 MMHG | BODY MASS INDEX: 20.23 KG/M2 | TEMPERATURE: 98.8 F | OXYGEN SATURATION: 100 % | HEART RATE: 79 BPM | DIASTOLIC BLOOD PRESSURE: 95 MMHG | WEIGHT: 129.19 LBS | RESPIRATION RATE: 18 BRPM

## 2023-02-28 DIAGNOSIS — K59.00 CONSTIPATION, UNSPECIFIED CONSTIPATION TYPE: Primary | ICD-10-CM

## 2023-02-28 PROCEDURE — 74011000250 HC RX REV CODE- 250: Performed by: EMERGENCY MEDICINE

## 2023-02-28 PROCEDURE — 74018 RADEX ABDOMEN 1 VIEW: CPT

## 2023-02-28 PROCEDURE — 74011250637 HC RX REV CODE- 250/637: Performed by: EMERGENCY MEDICINE

## 2023-02-28 PROCEDURE — 99283 EMERGENCY DEPT VISIT LOW MDM: CPT

## 2023-02-28 RX ORDER — LIDOCAINE HYDROCHLORIDE 20 MG/ML
JELLY TOPICAL
Status: COMPLETED | OUTPATIENT
Start: 2023-02-28 | End: 2023-02-28

## 2023-02-28 RX ADMIN — LACTULOSE 45 ML: 20 SOLUTION ORAL at 20:35

## 2023-02-28 RX ADMIN — LIDOCAINE HYDROCHLORIDE: 20 JELLY TOPICAL at 21:26

## 2023-03-01 NOTE — DISCHARGE INSTRUCTIONS
It was a pleasure taking care of you in our Emergency Department today. We know that when you come to [facility], you are entrusting us with your health, comfort, and safety. Our physicians and nurses honor that trust, and truly appreciate the opportunity to care for you and your loved ones. We also value your feedback. If you receive a survey about your Emergency Department experience today, please fill it out. We care about our patients' feedback, and we listen to what you have to say. Thank you!       Dr. Ximena Stern MD.

## 2023-03-01 NOTE — ED PROVIDER NOTES
EMERGENCY DEPARTMENT HISTORY AND PHYSICAL EXAM     ----------------------------------------------------------------------------  Please note that this dictation was completed with [employer], the computer voice recognition software. Quite often unanticipated grammatical, syntax, homophones, and other interpretive errors are inadvertently transcribed by the computer software. Please disregard these errors. Please excuse any errors that have escaped final proofreading  ----------------------------------------------------------------------------      Date: 2/28/2023  Patient Name: Valentino Prude    History of Presenting Illness     Chief Complaint   Patient presents with    Constipation     Has a constant problem with constipation that feels like the impaction is right there and when he tries he passes blood, and the straining is making his pacer do funny things. Onset on Sunday last bowel movement. Pt has tried Miralax, and Enema Fleets. History obtainted from:  Patient    Other independent source of history: none    HPI: Valentino Prude is a 41 y.o. male, with significant pmhx of hypertension, previous kidney transplant, AICD, previous small bowel obstruction, DVT, CAD, who presents via private vehicle to the ED with c/o constipation without having a bowel movement for the last few days. Notes that he has been taking MiraLAX twice a day and attempted to do an enema today but was unsuccessful in having a significant bowel movement. Notes that when he was attempting to strain earlier he felt as though he was having palpitations and may be an arrhythmia \"making my pacemaker do funny things. \"  Patient having palpitations at time of my evaluation, chest pain or shortness of breath. Notes that these \"funny feelings\" were only for a few seconds while straining and immediately resolved upon relaxing. Is having some blood on the paper when wiping earlier today without having blood in the toilet bowl.       PCP: Jay Mason MD    Allergies   Allergen Reactions    Shellfish Containing Products Swelling     Break out in rash, trouble breathing    Contrast Agent [Iodine] Shortness of Breath    Heparin Analogues Other (comments)     Positive history of HIT       Current Outpatient Medications   Medication Sig Dispense Refill    ondansetron (ZOFRAN ODT) 4 mg disintegrating tablet Take 1 Tablet by mouth every eight (8) hours as needed for Nausea or Vomiting. 20 Tablet 0    dicyclomine (BENTYL) 20 mg tablet Take 1 Tablet by mouth every eight (8) hours as needed for Abdominal Cramps. 24 Tablet 0    dicyclomine (BENTYL) 20 mg tablet Take 1 Tablet by mouth every six (6) hours as needed for Abdominal Cramps. 20 Tablet 0    aspirin delayed-release 81 mg tablet Take 1 Tablet by mouth daily. 30 Tablet 5    ondansetron (ZOFRAN ODT) 4 mg disintegrating tablet Take 1 Tablet by mouth every eight (8) hours as needed for Nausea or Vomiting. (Patient not taking: Reported on 9/19/2022) 10 Tablet 0    sevelamer carbonate (RENVELA) 800 mg tab tab Take 1,600 mg by mouth three (3) times daily. ondansetron hcl (Zofran) 4 mg tablet Take 1 Tablet by mouth every twelve (12) hours as needed for Nausea or Vomiting. (Patient not taking: Reported on 9/19/2022) 20 Tablet 0    metoprolol succinate (TOPROL-XL) 25 mg XL tablet Take 12.5 mg by mouth daily. pantoprazole (PROTONIX) 40 mg tablet Take 1 Tablet by mouth Before breakfast and dinner. 60 Tablet 2    sucralfate (CARAFATE) 1 gram tablet Take 1 Tablet by mouth Before breakfast, lunch, dinner and at bedtime. (Patient not taking: Reported on 9/19/2022) 120 Tablet 2    polyethylene glycol (MIRALAX) 17 gram packet Take 1 Packet by mouth daily. (Patient not taking: Reported on 9/19/2022) 30 Packet 0    Vitamin D2 1,250 mcg (50,000 unit) capsule Take 50,000 Units by mouth every seven (7) days.       calcium acetate,phosphat bind, (PHOSLO) 667 mg cap Take 2 Caps by mouth three (3) times daily (with meals). Indications: low amount of calcium in the blood, renal osteodystrophy with hyperphosphatemia 120 Cap 0    atorvastatin (LIPITOR) 20 mg tablet Take 20 mg by mouth daily. acetaminophen (TYLENOL) 500 mg tablet Take 1 Tab by mouth every four (4) hours as needed for Pain. Over the counter 30 Tab 0    calcitRIOL (ROCALTROL) 0.5 mcg capsule Take 0.5 mcg by mouth daily. Past History     Past Medical History:  Past Medical History:   Diagnosis Date    Abdominal hematoma     AICD (automatic cardioverter/defibrillator) present     CAD (coronary artery disease)     anterior MI s/p 2 stents  2007    Chronic abdominal pain     Chronic kidney disease     ESRD; Dialysis dependent.  [facility] [address] home x5 days week M-F does labs    DVT (deep venous thrombosis) ([address] 2001    DVT of popliteal vein ([address] 11/2011    not anticoagulated due to bleeding, IVC filter    Endocarditis     Gallstone pancreatitis     Gastrointestinal disorder     acid reflux    Gastrointestinal disorder     peptic ulcer    Hemodialysis patient ([address]     High cholesterol     HIT (heparin-induced thrombocytopenia)     Hypertension     Kidney transplant     b/l kidneys 1995, 2001    Long term current use of anticoagulant therapy     Nephrotic syndrome     Other ill-defined conditions(799.89)     kidny transplant x2,  on dialysis    Other ill-defined conditions(799.89)     home dialysis    Other ill-defined conditions(799.89)     hx recurrent left leg DVT    Peritonitis ([address]     Seizures ([address] 2015    most recent- only had three in lifetime    Small bowel obstruction (HCC)     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    V-tach        Past Surgical History:  Past Surgical History:   Procedure Laterality Date    HX APPENDECTOMY      HX CHOLECYSTECTOMY      HX OTHER SURGICAL  11/2011    exploratory laparotomy    HX OTHER SURGICAL      fem-pop    HX OTHER SURGICAL  02/2013    right upper extremity fistula    HX PACEMAKER Left 6/2009    AICD-st latonya-not connected    HX PACEMAKER Left     AICD-left side-BS-working    HX SMALL BOWEL RESECTION      HX TRANSPLANT  2001     Kidney    HX TRANSPLANT  1992    kidney    HX VASCULAR ACCESS Right     femoral access for HD- does 5 day dialysis @ home/left upper arm graft active, right upper arm occluded    IR INSERT NON TUNL CVC OVER 5 YRS  12/7/2021    IR INSERT NON TUNL CVC OVER 5 YRS  12/10/2021    IR INSERT TIPS HEPATIC SHUNT  12/30/2021    IR REMOVE TUNL CVAD W/O PORT / PUMP  10/29/2020    IR REPLACE CVC TUNNELED W/O PORT  9/10/2020    LA CARDIAC SURG PROCEDURE UNLIST  2007    2 stents    LA EDG US EXAM SURGICAL ALTER STOM DUODENUM/JEJUNUM  7/15/2011         LA ESOPHAGOGASTRODUODENOSCOPY TRANSORAL DIAGNOSTIC  5/24/2012         UPPER GI ENDOSCOPY,CTRL BLEED  12/6/2021         UPPER GI ENDOSCOPY,DIAGNOSIS  12/8/2021         UPPER GI ENDOSCOPY,DIAGNOSIS  4/20/2022         VASCULAR SURGERY PROCEDURE UNLIST  11/14/2017    Insertion AV graft right upper arm (bovine); ligation of AV fistula right arm       Family History:  Family History   Problem Relation Age of Onset    COPD Mother     Lung Disease Mother     Cancer Father         stomach    Cancer Maternal Grandmother        Social History:  Social History     Tobacco Use    Smoking status: Never    Smokeless tobacco: Never   Vaping Use    Vaping Use: Never used   Substance Use Topics    Alcohol use: No    Drug use: Yes     Types: Prescription, OTC       Allergies: Allergies   Allergen Reactions    Shellfish Containing Products Swelling     Break out in rash, trouble breathing    Contrast Agent [Iodine] Shortness of Breath    Heparin Analogues Other (comments)     Positive history of HIT         Review of Systems   Review of Systems   Constitutional:  Negative for fever. Respiratory:  Negative for shortness of breath. Cardiovascular:  Negative for chest pain. Gastrointestinal:  Positive for constipation.  Negative for abdominal pain, diarrhea, nausea and vomiting. Physical Exam   Physical Exam  Vitals and nursing note reviewed. HENT:      Head: Normocephalic and atraumatic. Nose: No nasal deformity. Mouth/Throat:      Lips: No lesions. Eyes:      Conjunctiva/sclera: Conjunctivae normal.   Cardiovascular:      Rate and Rhythm: Normal rate. Pulmonary:      Effort: Pulmonary effort is normal.   Musculoskeletal:         General: Normal range of motion. Cervical back: Normal range of motion. No pain with movement. Right lower leg: No edema. Left lower leg: No edema. Skin:     General: Skin is dry. Findings: No rash. Neurological:      General: No focal deficit present. Mental Status: He is alert. Psychiatric:         Mood and Affect: Mood normal.         Diagnostic Study Results     Labs:   No results found for this or any previous visit (from the past 12 hour(s)). Radiologic Studies:  XR ABD (KUB)   Final Result   1. No evidence of bowel obstruction. Moderate to large volume of stool   throughout the colon. 2. Renal osteodystrophy. CT Results  (Last 48 hours)      None          CXR Results  (Last 48 hours)      None              Medical Decision Making     I am the first provider for this patient. Initial assessment performed. The patients presenting problems have been discussed, and they are in agreement with the care plan formulated and outlined with them. I have encouraged them to ask questions as they arise throughout their visit. I personally reviewed/interpreted pt's imaging of KUB. Please refer to official read by radiology as noted above.   Refugio Brennan MD      DDX:  Constipation, obstruction, hemorrhoids    Comorbidities:  Past Medical History:   Diagnosis Date    Abdominal hematoma     AICD (automatic cardioverter/defibrillator) present     CAD (coronary artery disease)     anterior MI s/p 2 stents  2007    Chronic abdominal pain     Chronic kidney disease     ESRD; Dialysis dependent. [facility] [address] home x5 days week M-F does labs    DVT (deep venous thrombosis) ([facility] 2001    DVT of popliteal vein ([facility] 11/2011    not anticoagulated due to bleeding, IVC filter    Endocarditis     Gallstone pancreatitis     Gastrointestinal disorder     acid reflux    Gastrointestinal disorder     peptic ulcer    Hemodialysis patient ([facility]     High cholesterol     HIT (heparin-induced thrombocytopenia)     Hypertension     Kidney transplant     b/l kidneys 1995, 2001    Long term current use of anticoagulant therapy     Nephrotic syndrome     Other ill-defined conditions(799.89)     kidny transplant x2,  on dialysis    Other ill-defined conditions(799.89)     home dialysis    Other ill-defined conditions(799.89)     hx recurrent left leg DVT    Peritonitis ([facility]     Seizures ([facility] 2015    most recent- only had three in lifetime    Small bowel obstruction (HCC)     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    V-tach          Plan:  Lactulose, KUB, soapsuds enema      ED COURSE      Records Review:  I reviewed and interpreted the nursing notes and and vital signs from today's visit, as well as the electronic medical record system for any external medical records that were available that may contribute to the patients current condition, including independent interpretation of primary care management of chronic medical conditions    Nursing notes will be reviewed and interpreted by me as they become available in realtime while the pt has been in the ED. Pulse Oximetry Analysis:  100% on RA, normal    Heart Monitory Analysis:  Rate: 79 bpm  Rhythm: nsr    Vital Signs-Reviewed the patient's vital signs.   Patient Vitals for the past 12 hrs:   Temp Pulse Resp BP SpO2   02/28/23 1758 98.8 °F (37.1 °C) 79 18 (!) 149/95 100 %                  MDM:  Patient is a 68-year-old male in no acute distress who presents emergency department for 3 days of constipation despite home medications. Patient was able to have successful bowel movement here in emergency department after use of subset enema and lactulose. Feels much improved and ready for discharge. Discharge Planning:  Pt noted to be feeling better, and after shared decision making conversation, pt is ready for discharge. Discussed imaging findings with pt, specifically noting no evidence of obstruction on KUB. Pt will follow up with primary care and GI as instructed. All questions have been answered, pt voiced understanding and agreement with plan. Specific return precautions provided in addition to instructions for pt to return to the ED immediately should sx worsen at any time. Everette Trevino MD      Critical Care Time:    none      Diagnosis     Clinical Impression:   1. Constipation, unspecified constipation type        PLAN:  1. Current Discharge Medication List        2. Follow-up Information       Follow up With Specialties Details Why Contact Info    Tl Wilson MD Family Medicine Schedule an appointment as soon as possible for a visit in 2 days  [facility]  [phone]      [facility] EMERGENCY DEPT Emergency Medicine  As needed, If symptoms worsen [address] [place] [address]  [phone]    Cachorro Montalvo MD Gastroenterology Schedule an appointment as soon as possible for a visit in 2 days As needed [id number] [place] [employer] [address] [place] [address] [phone]            Return to ED if worse     Social Determinants of Health  Social support networks. Education and literacy. Employment/working conditions. Social environments. Physical environments. Personal health practices and coping skills. Disposition:  10:55 PM   The patient's results have been reviewed with family and/or caregiver.  They verbally convey their understanding and agreement of the patient's signs, symptoms, diagnosis, treatment and prognosis and additionally agree to follow up as recommended in the discharge instructions or to return to the Emergency Room should the patient's condition change prior to their follow-up appointment. The family and/or caregiver verbally agrees with the care-plan and all of their questions have been answered. The discharge instructions have also been provided to the them with educational information regarding the patient's diagnosis as well a list of reasons why the patient would want to return to the ER prior to their follow-up appointment should their condition change.   Aleisha Craft MD

## 2023-03-12 ENCOUNTER — HOSPITAL ENCOUNTER (EMERGENCY)
Age: 48
Discharge: HOME | End: 2023-03-12
Attending: EMERGENCY MEDICINE
Payer: MEDICARE

## 2023-03-12 ENCOUNTER — APPOINTMENT (OUTPATIENT)
Dept: GENERAL RADIOLOGY | Age: 48
End: 2023-03-12
Attending: EMERGENCY MEDICINE
Payer: MEDICARE

## 2023-03-12 VITALS
HEIGHT: 67 IN | WEIGHT: 134.7 LBS | TEMPERATURE: 99 F | OXYGEN SATURATION: 96 % | RESPIRATION RATE: 15 BRPM | SYSTOLIC BLOOD PRESSURE: 152 MMHG | DIASTOLIC BLOOD PRESSURE: 84 MMHG | HEART RATE: 88 BPM | BODY MASS INDEX: 21.14 KG/M2

## 2023-03-12 DIAGNOSIS — K59.00 CONSTIPATION, UNSPECIFIED CONSTIPATION TYPE: Primary | ICD-10-CM

## 2023-03-12 LAB
ALBUMIN SERPL-MCNC: 3.5 G/DL (ref 3.5–5)
ALBUMIN/GLOB SERPL: 0.8 (ref 1.1–2.2)
ALP SERPL-CCNC: 411 U/L (ref 45–117)
ALT SERPL-CCNC: 16 U/L (ref 12–78)
ANION GAP SERPL CALC-SCNC: 12 MMOL/L (ref 5–15)
AST SERPL-CCNC: 16 U/L (ref 15–37)
BASOPHILS # BLD: 0.1 K/UL (ref 0–0.1)
BASOPHILS NFR BLD: 1 % (ref 0–1)
BILIRUB SERPL-MCNC: 0.4 MG/DL (ref 0.2–1)
BUN SERPL-MCNC: 85 MG/DL (ref 6–20)
BUN/CREAT SERPL: 6 (ref 12–20)
CALCIUM SERPL-MCNC: 7.9 MG/DL (ref 8.5–10.1)
CHLORIDE SERPL-SCNC: 96 MMOL/L (ref 97–108)
CO2 SERPL-SCNC: 24 MMOL/L (ref 21–32)
CREAT SERPL-MCNC: 14.4 MG/DL (ref 0.7–1.3)
DIFFERENTIAL METHOD BLD: ABNORMAL
EOSINOPHIL # BLD: 0.5 K/UL (ref 0–0.4)
EOSINOPHIL NFR BLD: 6 % (ref 0–7)
ERYTHROCYTE [DISTWIDTH] IN BLOOD BY AUTOMATED COUNT: 15.9 % (ref 11.5–14.5)
GLOBULIN SER CALC-MCNC: 4.6 G/DL (ref 2–4)
GLUCOSE SERPL-MCNC: 98 MG/DL (ref 65–100)
HCT VFR BLD AUTO: 31.7 % (ref 36.6–50.3)
HGB BLD-MCNC: 10.4 G/DL (ref 12.1–17)
IMM GRANULOCYTES # BLD AUTO: 0 K/UL (ref 0–0.04)
IMM GRANULOCYTES NFR BLD AUTO: 0 % (ref 0–0.5)
LIPASE SERPL-CCNC: 131 U/L (ref 73–393)
LYMPHOCYTES # BLD: 1.6 K/UL (ref 0.8–3.5)
LYMPHOCYTES NFR BLD: 20 % (ref 12–49)
MCH RBC QN AUTO: 31.7 PG (ref 26–34)
MCHC RBC AUTO-ENTMCNC: 32.8 G/DL (ref 30–36.5)
MCV RBC AUTO: 96.6 FL (ref 80–99)
MONOCYTES # BLD: 1 K/UL (ref 0–1)
MONOCYTES NFR BLD: 13 % (ref 5–13)
NEUTS SEG # BLD: 4.9 K/UL (ref 1.8–8)
NEUTS SEG NFR BLD: 60 % (ref 32–75)
NRBC # BLD: 0 K/UL (ref 0–0.01)
NRBC BLD-RTO: 0 PER 100 WBC
PLATELET # BLD AUTO: 184 K/UL (ref 150–400)
PMV BLD AUTO: 9.9 FL (ref 8.9–12.9)
POTASSIUM SERPL-SCNC: 4.6 MMOL/L (ref 3.5–5.1)
PROT SERPL-MCNC: 8.1 G/DL (ref 6.4–8.2)
RBC # BLD AUTO: 3.28 M/UL (ref 4.1–5.7)
SODIUM SERPL-SCNC: 132 MMOL/L (ref 136–145)
WBC # BLD AUTO: 8.2 K/UL (ref 4.1–11.1)

## 2023-03-12 PROCEDURE — 74018 RADEX ABDOMEN 1 VIEW: CPT

## 2023-03-12 PROCEDURE — 85025 COMPLETE CBC W/AUTO DIFF WBC: CPT

## 2023-03-12 PROCEDURE — 80053 COMPREHEN METABOLIC PANEL: CPT

## 2023-03-12 PROCEDURE — 83690 ASSAY OF LIPASE: CPT

## 2023-03-12 PROCEDURE — 99284 EMERGENCY DEPT VISIT MOD MDM: CPT

## 2023-03-12 PROCEDURE — 36415 COLL VENOUS BLD VENIPUNCTURE: CPT

## 2023-03-12 PROCEDURE — 74011000250 HC RX REV CODE- 250: Performed by: EMERGENCY MEDICINE

## 2023-03-12 RX ORDER — BISACODYL 5 MG
5 TABLET, DELAYED RELEASE (ENTERIC COATED) ORAL DAILY
Qty: 30 TABLET | Refills: 0 | Status: SHIPPED | OUTPATIENT
Start: 2023-03-12

## 2023-03-12 RX ORDER — LIDOCAINE HYDROCHLORIDE 20 MG/ML
JELLY TOPICAL
Status: COMPLETED | OUTPATIENT
Start: 2023-03-12 | End: 2023-03-12

## 2023-03-12 RX ADMIN — LIDOCAINE HYDROCHLORIDE 1 ML: 20 JELLY TOPICAL at 22:00

## 2023-03-12 RX ADMIN — LIDOCAINE HYDROCHLORIDE: 20 JELLY TOPICAL at 21:00

## 2023-03-13 NOTE — ED PROVIDER NOTES
[facility] EMERGENCY DEPT  EMERGENCY DEPARTMENT ENCOUNTER       Pt Name: Chaitanya Cordero  MRN: [id number]  [place] 1975  Date of evaluation: 3/12/2023  Provider: Marianela Marsh MD   PCP: Neida Sharpe MD  Note Started: 2:20 AM 3/13/23     CHIEF COMPLAINT       Chief Complaint   Patient presents with    Abdominal Pain     Pt arrived to ED from home with lower abd pain and constipation since last week. Pt hx of bowel resection. HISTORY OF PRESENT ILLNESS: 1 or more elements      History From: Patient       Chaitanya Cordero is a 41 y.o. male with past medical history significant for end-stage renal disease on home hemodialysis 5 days a week, coronary artery disease s/p stents, AICD, congestive heart failure, femoropopliteal bypass, past surgical history significant for appendectomy, cholecystectomy, resection of small bowel due to necrotic bowel who presents to ED with chief complaint of constipation. Patient reports his last bowel movement was on 2/28 when he was seen in the ED for constipation. He was given lidocaine jelly and a soapsuds enema with relief. Since then he has been taking MiraLAX but has not had any further bowel movements. He has tried fleets enemas and laxatives at home. He currently takes oxycodone 10 mg every 4 hours as needed for pain. Denies any black or bloody stools. He does not make urine. Denies any nausea or vomiting. Denies fevers or chills at     Nursing Notes were all reviewed and agreed with or any disagreements were addressed in the HPI. REVIEW OF SYSTEMS      Review of Systems     Positives and Pertinent negatives as per HPI. PAST HISTORY     Past Medical History:  Past Medical History:   Diagnosis Date    Abdominal hematoma     AICD (automatic cardioverter/defibrillator) present     CAD (coronary artery disease)     anterior MI s/p 2 stents  2007    Chronic abdominal pain     Chronic kidney disease     ESRD; Dialysis dependent.  [address] x5 days week M-F does labs    DVT (deep venous thrombosis) ([address] 2001    DVT of popliteal vein ([address] 11/2011    not anticoagulated due to bleeding, IVC filter    Endocarditis     Gallstone pancreatitis     Gastrointestinal disorder     acid reflux    Gastrointestinal disorder     peptic ulcer    Hemodialysis patient ([address]     High cholesterol     HIT (heparin-induced thrombocytopenia)     Hypertension     Kidney transplant     b/l kidneys 1995, 2001    Long term current use of anticoagulant therapy     Nephrotic syndrome     Other ill-defined conditions(799.89)     kidny transplant x2,  on dialysis    Other ill-defined conditions(799.89)     home dialysis    Other ill-defined conditions(799.89)     hx recurrent left leg DVT    Peritonitis ([address]     Seizures ([address] 2015    most recent- only had three in lifetime    Small bowel obstruction (HCC)     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    V-tach        Past Surgical History:  Past Surgical History:   Procedure Laterality Date    HX APPENDECTOMY      HX CHOLECYSTECTOMY      HX OTHER SURGICAL  11/2011    exploratory laparotomy    HX OTHER SURGICAL      fem-pop    HX OTHER SURGICAL  02/2013    right upper extremity fistula    HX PACEMAKER Left 6/2009    AICD-st latonya-not connected    HX PACEMAKER Left     AICD-left side-BS-working    HX SMALL BOWEL RESECTION      HX TRANSPLANT  2001     Kidney    HX TRANSPLANT  1992    kidney    HX VASCULAR ACCESS Right     femoral access for HD- does 5 day dialysis @ home/left upper arm graft active, right upper arm occluded    IR INSERT NON TUNL CVC OVER 5 YRS  12/7/2021    IR INSERT NON TUNL CVC OVER 5 YRS  12/10/2021    IR INSERT TIPS HEPATIC SHUNT  12/30/2021    IR REMOVE TUNL CVAD W/O PORT / PUMP  10/29/2020    IR REPLACE CVC TUNNELED W/O PORT  9/10/2020    [facility] CARDIAC SURG PROCEDURE UNLIST  2007    2 stents    VA EDG US EXAM SURGICAL ALTER STOM DUODENUM/JEJUNUM  7/15/2011         VA ESOPHAGOGASTRODUODENOSCOPY TRANSORAL DIAGNOSTIC  5/24/2012         UPPER GI ENDOSCOPY,CTRL BLEED  12/6/2021         UPPER GI ENDOSCOPY,DIAGNOSIS  12/8/2021         UPPER GI ENDOSCOPY,DIAGNOSIS  4/20/2022         VASCULAR SURGERY PROCEDURE UNLIST  11/14/2017    Insertion AV graft right upper arm (bovine); ligation of AV fistula right arm       Family History:  Family History   Problem Relation Age of Onset    COPD Mother     Lung Disease Mother     Cancer Father         stomach    Cancer Maternal Grandmother        Social History:  Social History     Tobacco Use    Smoking status: Never    Smokeless tobacco: Never   Vaping Use    Vaping Use: Never used   Substance Use Topics    Alcohol use: No    Drug use: Yes     Types: Prescription, OTC       Allergies: Allergies   Allergen Reactions    Shellfish Containing Products Swelling     Break out in rash, trouble breathing    Contrast Agent [Iodine] Shortness of Breath    Heparin Analogues Other (comments)     Positive history of HIT       CURRENT MEDICATIONS      Discharge Medication List as of 3/12/2023 11:02 PM        CONTINUE these medications which have NOT CHANGED    Details   !! ondansetron (ZOFRAN ODT) 4 mg disintegrating tablet Take 1 Tablet by mouth every eight (8) hours as needed for Nausea or Vomiting., Normal, Disp-20 Tablet, R-0      !! dicyclomine (BENTYL) 20 mg tablet Take 1 Tablet by mouth every eight (8) hours as needed for Abdominal Cramps., Normal, Disp-24 Tablet, R-0      !! dicyclomine (BENTYL) 20 mg tablet Take 1 Tablet by mouth every six (6) hours as needed for Abdominal Cramps., Normal, Disp-20 Tablet, R-0      aspirin delayed-release 81 mg tablet Take 1 Tablet by mouth daily. , Normal, Disp-30 Tablet, R-5      !! ondansetron (ZOFRAN ODT) 4 mg disintegrating tablet Take 1 Tablet by mouth every eight (8) hours as needed for Nausea or Vomiting., Normal, Disp-10 Tablet, R-0      sevelamer carbonate (RENVELA) 800 mg tab tab Take 1,600 mg by mouth three (3) times daily. , Historical Med      ondansetron hcl (Zofran) 4 mg tablet Take 1 Tablet by mouth every twelve (12) hours as needed for Nausea or Vomiting., Normal, Disp-20 Tablet, R-0      metoprolol succinate (TOPROL-XL) 25 mg XL tablet Take 12.5 mg by mouth daily. , Historical Med      pantoprazole (PROTONIX) 40 mg tablet Take 1 Tablet by mouth Before breakfast and dinner., Normal, Disp-60 Tablet, R-2      sucralfate (CARAFATE) 1 gram tablet Take 1 Tablet by mouth Before breakfast, lunch, dinner and at bedtime., Normal, Disp-120 Tablet, R-2      polyethylene glycol (MIRALAX) 17 gram packet Take 1 Packet by mouth daily. , Normal, Disp-30 Packet, R-0      Vitamin D2 1,250 mcg (50,000 unit) capsule Take 50,000 Units by mouth every seven (7) days. , Historical Med, KIMBERLY      calcium acetate,phosphat bind, (PHOSLO) 667 mg cap Take 2 Caps by mouth three (3) times daily (with meals). Indications: low amount of calcium in the blood, renal osteodystrophy with hyperphosphatemia, Normal, Disp-120 Cap,R-0      atorvastatin (LIPITOR) 20 mg tablet Take 20 mg by mouth daily. , Historical Med      acetaminophen (TYLENOL) 500 mg tablet Take 1 Tab by mouth every four (4) hours as needed for Pain. Over the counter, No Print, Disp-30 Tab,R-0      calcitRIOL (ROCALTROL) 0.5 mcg capsule Take 0.5 mcg by mouth daily. , Historical Med       !! - Potential duplicate medications found. Please discuss with provider. PHYSICAL EXAM      ED Triage Vitals [03/12/23 2002]   ED Encounter Vitals Group      BP (!) 152/84      Pulse (Heart Rate) (!) 104      Resp Rate 14      Temp 99 °F (37.2 °C)      Temp src       O2 Sat (%) 97 %      Weight 134 lb 11.2 oz      Height 5' 7\"        Physical Exam  Vitals and nursing note reviewed. Constitutional:       Comments: Thin   HENT:      Head: Normocephalic. Eyes:      Extraocular Movements: Extraocular movements intact. Cardiovascular:      Rate and Rhythm: Normal rate and regular rhythm.    Pulmonary:      Effort: Pulmonary effort is normal.   Abdominal:      General: Bowel sounds are normal.      Palpations: Abdomen is soft. There is no mass. Tenderness: There is abdominal tenderness (Mild generalized). There is no right CVA tenderness or left CVA tenderness. Musculoskeletal:      Comments: Left upper extremity with dialysis graft with thrill , right upper extremity with nonfunctioning dialysis graft   Skin:     General: Skin is dry. Capillary Refill: Capillary refill takes less than 2 seconds. Neurological:      General: No focal deficit present. Mental Status: He is alert and oriented to person, place, and time. Psychiatric:         Mood and Affect: Mood is anxious. DIAGNOSTIC RESULTS   LABS:     Recent Results (from the past 12 hour(s))   CBC WITH AUTOMATED DIFF    Collection Time: 03/12/23  8:29 PM   Result Value Ref Range    WBC 8.2 4.1 - 11.1 K/uL    RBC 3.28 (L) 4.10 - 5.70 M/uL    HGB 10.4 (L) 12.1 - 17.0 g/dL    HCT 31.7 (L) 36.6 - 50.3 %    MCV 96.6 80.0 - 99.0 FL    MCH 31.7 26.0 - 34.0 PG    MCHC 32.8 30.0 - 36.5 g/dL    RDW 15.9 (H) 11.5 - 14.5 %    PLATELET 026 113 - 309 K/uL    MPV 9.9 8.9 - 12.9 FL    NRBC 0.0 0  WBC    ABSOLUTE NRBC 0.00 0.00 - 0.01 K/uL    NEUTROPHILS 60 32 - 75 %    LYMPHOCYTES 20 12 - 49 %    MONOCYTES 13 5 - 13 %    EOSINOPHILS 6 0 - 7 %    BASOPHILS 1 0 - 1 %    IMMATURE GRANULOCYTES 0 0.0 - 0.5 %    ABS. NEUTROPHILS 4.9 1.8 - 8.0 K/UL    ABS. LYMPHOCYTES 1.6 0.8 - 3.5 K/UL    ABS. MONOCYTES 1.0 0.0 - 1.0 K/UL    ABS. EOSINOPHILS 0.5 (H) 0.0 - 0.4 K/UL    ABS. BASOPHILS 0.1 0.0 - 0.1 K/UL    ABS. IMM.  GRANS. 0.0 0.00 - 0.04 K/UL    DF AUTOMATED     METABOLIC PANEL, COMPREHENSIVE    Collection Time: 03/12/23  8:29 PM   Result Value Ref Range    Sodium 132 (L) 136 - 145 mmol/L    Potassium 4.6 3.5 - 5.1 mmol/L    Chloride 96 (L) 97 - 108 mmol/L    CO2 24 21 - 32 mmol/L    Anion gap 12 5 - 15 mmol/L    Glucose 98 65 - 100 mg/dL    BUN 85 (H) 6 - 20 MG/DL    Creatinine 14.40 (H) 0.70 - 1.30 MG/DL    BUN/Creatinine ratio 6 (L) 12 - 20      eGFR 4 (L) >60 ml/min/1.73m2    Calcium 7.9 (L) 8.5 - 10.1 MG/DL    Bilirubin, total 0.4 0.2 - 1.0 MG/DL    ALT (SGPT) 16 12 - 78 U/L    AST (SGOT) 16 15 - 37 U/L    Alk. phosphatase 411 (H) 45 - 117 U/L    Protein, total 8.1 6.4 - 8.2 g/dL    Albumin 3.5 3.5 - 5.0 g/dL    Globulin 4.6 (H) 2.0 - 4.0 g/dL    A-G Ratio 0.8 (L) 1.1 - 2.2     LIPASE    Collection Time: 03/12/23  8:29 PM   Result Value Ref Range    Lipase 131 73 - 393 U/L             RADIOLOGY:  Non-plain film images such as CT, Ultrasound and MRI are read by the radiologist. Plain radiographic images are visualized and preliminarily interpreted by the ED Provider with the below findings:     KUB on 3/12/2023 interpreted by me: No bowel obstruction, large stool burden present     Interpretation per the Radiologist below, if available at the time of this note:     XR ABD (KUB)    Result Date: 3/12/2023  EXAM:  XR ABD (KUB) INDICATION: Constipation COMPARISON: 2/28/2023 plain film and 12/31/2022 CT TECHNIQUE: Supine frontal abdomen (KUB). FINDINGS: No dilated small bowel. There is a large amount of stool in the colon. Stool and gas are present in the colon. Bilateral pelvic sidewall stable calcifications is seen. . Osseous structures are age appropriate. An IVC filter is present.      Constipation without evidence for obstipation            EMERGENCY DEPARTMENT COURSE and DIFFERENTIAL DIAGNOSIS/MDM   Vitals:    Vitals:    03/12/23 2002 03/12/23 2300   BP: (!) 152/84    Pulse: (!) 104 88   Resp: 14 15   Temp: 99 °F (37.2 °C)    SpO2: 97% 96%   Weight: 61.1 kg (134 lb 11.2 oz)    Height: 5' 7\" (1.702 m)         Patient was given the following medications:  Medications   lidocaine (URO-JET/ GLYDO) 2 % jelly ( Urethral Given by Provider 3/12/23 2100)   lidocaine (URO-JET/ GLYDO) 2 % jelly (1 mL Urethral Given 3/12/23 2200)           Records Reviewed (source and summary of external notes):   Reviewed ED note from 2/28/2023. Patient seen for similar episode of constipation. KUB showed moderate to large volume of stool throughout colon    CC/HPI Summary, DDx, ED Course, and Reassessment:   Patient presents with abdominal discomfort and constipation. Last bowel movement was 2 weeks ago in the ED which required soapsuds enema. Patient is on oxycodone for chronic pain. Suspect this is contributing to his constipation. Differential diagnosis includes bowel obstruction, constipation, dehydration, electrolyte abnormality  Labs and imaging obtained including CBC, CMP, KUB. Patient does not make urine. CBC shows moderate anemia with hemoglobin of 10.4 which is baseline. White count is normal.  CMP shows renal failure. Patient does hemodialysis at home 5 days a week. KUB shows moderate to large stool burden. Patient requested lidocaine jelly prior to soapsuds enema in ED. ED Course as of 03/13/23 0220   Lizbet Busch Mar 12, 2023   2055 On rectal exam, patient has impaction present, but is refusing disimpaction. Wants us to try a soapsuds enema. [AO]   1588 Patient had a large bowel movement after soapsuds enema and is feeling much better. Ready for discharge. [AO]      ED Course User Index  [AO] Ade Sanders MD           FINAL IMPRESSION     1.  Constipation, unspecified constipation type          DISPOSITION/PLAN   Discharged         PATIENT REFERRED TO:  Follow-up Information       Follow up With Specialties Details Why Contact Info    Eugenia Garrett MD [facility] Medicine Call today  [employer]  [phone]      [facility] EMERGENCY DEPT Emergency Medicine Go to  If symptoms worsen [address] [place] [address]    Netta Payne MD Gastroenterology Schedule an appointment as soon as possible for a visit   [address] [place] [address]  132 [facility]  [phone]                DISCHARGE MEDICATIONS:  Discharge Medication List as of 3/12/2023 11:02 PM        START taking these medications    Details   bisacodyL (Dulcolax, bisacodyl,) 5 mg EC tablet Take 1 Tablet by mouth daily. , Normal, Disp-30 Tablet, R-0           CONTINUE these medications which have NOT CHANGED    Details   !! ondansetron (ZOFRAN ODT) 4 mg disintegrating tablet Take 1 Tablet by mouth every eight (8) hours as needed for Nausea or Vomiting., Normal, Disp-20 Tablet, R-0      !! dicyclomine (BENTYL) 20 mg tablet Take 1 Tablet by mouth every eight (8) hours as needed for Abdominal Cramps., Normal, Disp-24 Tablet, R-0      !! dicyclomine (BENTYL) 20 mg tablet Take 1 Tablet by mouth every six (6) hours as needed for Abdominal Cramps., Normal, Disp-20 Tablet, R-0      aspirin delayed-release 81 mg tablet Take 1 Tablet by mouth daily. , Normal, Disp-30 Tablet, R-5      !! ondansetron (ZOFRAN ODT) 4 mg disintegrating tablet Take 1 Tablet by mouth every eight (8) hours as needed for Nausea or Vomiting., Normal, Disp-10 Tablet, R-0      sevelamer carbonate (RENVELA) 800 mg tab tab Take 1,600 mg by mouth three (3) times daily. , Historical Med      ondansetron hcl (Zofran) 4 mg tablet Take 1 Tablet by mouth every twelve (12) hours as needed for Nausea or Vomiting., Normal, Disp-20 Tablet, R-0      metoprolol succinate (TOPROL-XL) 25 mg XL tablet Take 12.5 mg by mouth daily. , Historical Med      pantoprazole (PROTONIX) 40 mg tablet Take 1 Tablet by mouth Before breakfast and dinner., Normal, Disp-60 Tablet, R-2      sucralfate (CARAFATE) 1 gram tablet Take 1 Tablet by mouth Before breakfast, lunch, dinner and at bedtime., Normal, Disp-120 Tablet, R-2      polyethylene glycol (MIRALAX) 17 gram packet Take 1 Packet by mouth daily. , Normal, Disp-30 Packet, R-0      Vitamin D2 1,250 mcg (50,000 unit) capsule Take 50,000 Units by mouth every seven (7) days. , Historical Med, KIMBERLY      calcium acetate,phosphat bind, (PHOSLO) 667 mg cap Take 2 Caps by mouth three (3) times daily (with meals). Indications: low amount of calcium in the blood, renal osteodystrophy with hyperphosphatemia, Normal, Disp-120 Cap,R-0      atorvastatin (LIPITOR) 20 mg tablet Take 20 mg by mouth daily. , Historical Med      acetaminophen (TYLENOL) 500 mg tablet Take 1 Tab by mouth every four (4) hours as needed for Pain. Over the counter, No Print, Disp-30 Tab,R-0      calcitRIOL (ROCALTROL) 0.5 mcg capsule Take 0.5 mcg by mouth daily. , Historical Med       !! - Potential duplicate medications found. Please discuss with provider. DISCONTINUED MEDICATIONS:  Discharge Medication List as of 3/12/2023 11:02 PM          I am the Primary Clinician of Record. Manav Bender MD (electronically signed)    (Please note that parts of this dictation were completed with voice recognition software. Quite often unanticipated grammatical, syntax, homophones, and other interpretive errors are inadvertently transcribed by the computer software. Please disregards these errors.  Please excuse any errors that have escaped final proofreading.)

## 2023-03-13 NOTE — ED NOTES
Pt given enema as ordered at this time, tolerated well, denies any other needs at this time. Pt up on bedside commode.

## 2023-03-13 NOTE — ED NOTES
Discussed discharge instructions with pt-state understanding. Pt remains AOX4, PWD, ambulatory with steady gait to waiting room.

## 2023-03-13 NOTE — ED NOTES
Assumed care of pt. Pt reports has not had bowel movement since the 28th which was here and had to get enema, tried them at home and not helping, hx of bowel obstructions, pain 4/10-cramping. Pt AOX4, PWD, ambulatory to bathroom at this time.

## 2023-05-02 NOTE — PROGRESS NOTES
Nephrology Progress Note Blaise Leblanc  
 
www. [facility]                  Phone - [phone] Patient: Sabas Andrews YOB: 1975     Admit Date: 11/16/2018 Date- 11/20/2018 CC: Follow up for ESRD Subjective: Interval History: -  S/P HD YESTERDAY No issues with graft yesterday Blood cultures negative Ct abdo showed hemorrhagic cyst. No mass No c/o sob, chest pain, No c/o nausea or vomiting No c/o  fever. ROS:- as above Assessment:  
· esrd- Home HD 5 days/ week, F/B DR. BANEGAS- 
· Anemia of CKD · Secondary hyperparathyroidism of renal origin · Sepsis · H/o hypertension · Left renal cyst with hemorrhage · Plan: · No dialysis today · Remove permacath today · Pt will need vancomycin after discharge. We will ask  to set up out pt antibiotcs. · He may have to go to dialysis unit to get his antibiotics · CT ABDO results d/w pt- he will need urology follow up as out pt · He is on coumadin for DVT, clotting access. · epogen mwf · Continue calcitriol · Continue renvela Physical Exam:  
General: NAD Resp:  Clear lungs, no wheezing or rales. CV:  Regular  rhythm,  S1,S2 GI:  Soft, Non distended, Non tender. Neurologic:  Alert and oriented X 3. Non Focal 
Skin:  no rashes or ulcers. No jaundice Right fem permacath + Right arm av graft + Care Plan discussed with: pt ,  Objective:  
Patient Vitals for the past 24 hrs: 
 Temp Pulse Resp BP SpO2  
11/20/18 0835 98.1 °F (36.7 °C) 76 16 98/67 100 % 11/20/18 0252 97.8 °F (36.6 °C) 81 18 91/53 100 % 11/19/18 2242 98 °F (36.7 °C) 83 18 99/65 98 % 11/19/18 1919 98.1 °F (36.7 °C) 95 18 107/60 100 % 11/19/18 1550 97.8 °F (36.6 °C) 65 18 121/65   
11/19/18 1545  (!) 57 18 119/72   
11/19/18 1530  70 18 127/80   
11/19/18 1515  (!) 58 18 123/73   
11/19/18 1500  73 18 127/85   
11/19/18 1445  72 18 132/79  11/19/18 1430  71 18 124/75   
11/19/18 1415  71 18 124/83   
11/19/18 1400  75 18 115/84   
11/19/18 1345  78 18 129/78   
11/19/18 1330  66 18 116/73   
11/19/18 1315  73 18 117/80   
11/19/18 1300  73 18 126/82   
11/19/18 1248 98.3 °F (36.8 °C) 79 18 122/80   
11/19/18 1044 97.2 °F (36.2 °C) 85 18 118/76 100 % Last 3 Recorded Weights in this Encounter 11/18/18 9146 11/19/18 0229 11/20/18 9463 Weight: 64.7 kg (142 lb 10.2 oz) 68.4 kg (150 lb 12.7 oz) 67.5 kg (148 lb 13 oz)  
 
11/18 1901 - 11/20 0700 In: 540 [P.O.:240; I.V.:300] Out: 2500 Chart reviewed. Pertinent Notes reviewed. Medication list  reviewed Current Facility-Administered Medications Medication  epoetin emilie (EPOGEN;PROCRIT) 14,000 Units  sodium chloride (NS) flush 5-10 mL  sodium chloride (NS) flush 5-10 mL  acetaminophen (TYLENOL) tablet 650 mg  
 ondansetron (ZOFRAN) injection 4 mg  oxyCODONE-acetaminophen (PERCOCET) 5-325 mg per tablet 1 Tab  piperacillin-tazobactam (ZOSYN) 3.375 g in 0.9% sodium chloride (MBP/ADV) 100 mL  
 HYDROmorphone (DILAUDID) injection 0.5 mg  
 naloxone (NARCAN) injection 0.1 mg  
 atorvastatin (LIPITOR) tablet 20 mg  
 calcitRIOL (ROCALTROL) capsule 0.5 mcg  lacosamide (VIMPAT) tablet 50 mg  levETIRAcetam (KEPPRA) tablet 750 mg  
 pantoprazole (PROTONIX) tablet 40 mg  
 sevelamer carbonate (RENVELA) tab 2,400 mg  
 vancomycin (VANCOCIN) 750 mg in 0.9% sodium chloride 250 mL ([id number]) And  Vancomycin HD reminder note Data Review : 
Recent Labs  
  11/20/18 
0410 11/19/18 
1247 11/18/18 
0330 WBC 10.3 6.3 9.5 HGB 9.1* 8.6* 7.7*  139* 122* ANEU 8.6* 5.8 6.8 INR 2.4*  --  3.1*  133* 136  
K 4.0 4.2 3.5 * 136* 96 BUN 31* 46* 38* CREA 9.22* 13.80* 10.90* CA 9.2 8.8 9.0 MG 2.5*  --   --   
 
Lab Results Component Value Date/Time  Culture result: NO GROWTH AFTER 12 HOURS 11/19/2018 03:35 PM  
 Culture result: NO GROWTH 4 DAYS 11/16/2018 08:32 PM  
 Culture result: NO GROWTH 5 DAYS 06/03/2017 05:16 AM  
 
Lab Results Component Value Date/Time Specimen Description: NARES 05/30/2013 05:53 PM  
 Specimen Description: URINE 11/16/2012 05:15 PM  
 Specimen Description: URINE 09/17/2012 01:35 AM  
 
No results for input(s): FE, TIBC, PSAT, FERR in the last 72 hours. Lab Results Component Value Date/Time Sodium,urine random 115 07/14/2011 07:20 PM  
 Creatinine, urine 52.2 07/14/2011 07:20 PM  
 
 
 
 
Bassam Chávez MD 
[facility] 
 www. [facility] / [employer] [address] [facility], [address] Phone - [phone] Fax - [phone] 02-May-2023

## 2023-05-08 ENCOUNTER — HOSPITAL ENCOUNTER (INPATIENT)
Facility: HOSPITAL | Age: 48
LOS: 18 days | Discharge: NURSING, REHAB OR LONG-TERM CARE | DRG: 270 | End: 2023-05-27
Attending: EMERGENCY MEDICINE | Admitting: STUDENT IN AN ORGANIZED HEALTH CARE EDUCATION/TRAINING PROGRAM
Payer: MEDICARE

## 2023-05-08 DIAGNOSIS — N18.6 ESRD ON DIALYSIS (HCC): ICD-10-CM

## 2023-05-08 DIAGNOSIS — M79.605 ACUTE PAIN OF LOWER EXTREMITY, LEFT: ICD-10-CM

## 2023-05-08 DIAGNOSIS — Z99.2 ESRD ON DIALYSIS (HCC): ICD-10-CM

## 2023-05-08 DIAGNOSIS — I73.9 PAD (PERIPHERAL ARTERY DISEASE) (HCC): ICD-10-CM

## 2023-05-08 DIAGNOSIS — I70.222 CRITICAL LIMB ISCHEMIA OF LEFT LOWER EXTREMITY (HCC): ICD-10-CM

## 2023-05-08 DIAGNOSIS — M79.605 LEFT LEG PAIN: Primary | ICD-10-CM

## 2023-05-08 LAB — LACTATE BLD-SCNC: 1.33 MMOL/L (ref 0.4–2)

## 2023-05-08 PROCEDURE — 85025 COMPLETE CBC W/AUTO DIFF WBC: CPT

## 2023-05-08 PROCEDURE — 83605 ASSAY OF LACTIC ACID: CPT

## 2023-05-08 PROCEDURE — 85610 PROTHROMBIN TIME: CPT

## 2023-05-08 PROCEDURE — 36415 COLL VENOUS BLD VENIPUNCTURE: CPT

## 2023-05-08 PROCEDURE — 99285 EMERGENCY DEPT VISIT HI MDM: CPT

## 2023-05-08 PROCEDURE — 80053 COMPREHEN METABOLIC PANEL: CPT

## 2023-05-08 PROCEDURE — 85730 THROMBOPLASTIN TIME PARTIAL: CPT

## 2023-05-08 RX ORDER — FENTANYL CITRATE 50 UG/ML
25 INJECTION, SOLUTION INTRAMUSCULAR; INTRAVENOUS
Status: COMPLETED | OUTPATIENT
Start: 2023-05-08 | End: 2023-05-09

## 2023-05-08 ASSESSMENT — PAIN - FUNCTIONAL ASSESSMENT: PAIN_FUNCTIONAL_ASSESSMENT: 0-10

## 2023-05-08 ASSESSMENT — LIFESTYLE VARIABLES
HOW MANY STANDARD DRINKS CONTAINING ALCOHOL DO YOU HAVE ON A TYPICAL DAY: PATIENT DOES NOT DRINK
HOW OFTEN DO YOU HAVE A DRINK CONTAINING ALCOHOL: NEVER

## 2023-05-08 ASSESSMENT — PAIN DESCRIPTION - LOCATION: LOCATION: LEG

## 2023-05-08 ASSESSMENT — PAIN DESCRIPTION - ORIENTATION: ORIENTATION: LEFT

## 2023-05-08 ASSESSMENT — PAIN SCALES - GENERAL: PAINLEVEL_OUTOF10: 10

## 2023-05-09 ENCOUNTER — APPOINTMENT (OUTPATIENT)
Facility: HOSPITAL | Age: 48
DRG: 270 | End: 2023-05-09
Payer: MEDICARE

## 2023-05-09 PROBLEM — M79.605 LEFT LEG PAIN: Status: ACTIVE | Noted: 2023-05-09

## 2023-05-09 PROBLEM — Z86.16 HISTORY OF COVID-19: Status: ACTIVE | Noted: 2021-11-27

## 2023-05-09 LAB
ALBUMIN SERPL-MCNC: 3.2 G/DL (ref 3.5–5)
ALBUMIN SERPL-MCNC: 3.4 G/DL (ref 3.5–5)
ALBUMIN/GLOB SERPL: 0.8 (ref 1.1–2.2)
ALBUMIN/GLOB SERPL: 0.8 (ref 1.1–2.2)
ALP SERPL-CCNC: 342 U/L (ref 45–117)
ALP SERPL-CCNC: 345 U/L (ref 45–117)
ALT SERPL-CCNC: 10 U/L (ref 12–78)
ALT SERPL-CCNC: 12 U/L (ref 12–78)
ANION GAP SERPL CALC-SCNC: 8 MMOL/L (ref 5–15)
ANION GAP SERPL CALC-SCNC: 8 MMOL/L (ref 5–15)
APTT PPP: 24.9 SEC (ref 22.1–31)
AST SERPL-CCNC: 15 U/L (ref 15–37)
AST SERPL-CCNC: 24 U/L (ref 15–37)
BASOPHILS # BLD: 0.1 K/UL (ref 0–0.1)
BASOPHILS # BLD: 0.1 K/UL (ref 0–0.1)
BASOPHILS NFR BLD: 1 % (ref 0–1)
BASOPHILS NFR BLD: 1 % (ref 0–1)
BILIRUB SERPL-MCNC: 0.5 MG/DL (ref 0.2–1)
BILIRUB SERPL-MCNC: 0.8 MG/DL (ref 0.2–1)
BUN SERPL-MCNC: 51 MG/DL (ref 6–20)
BUN SERPL-MCNC: 55 MG/DL (ref 6–20)
BUN/CREAT SERPL: 5 (ref 12–20)
BUN/CREAT SERPL: 5 (ref 12–20)
CALCIUM SERPL-MCNC: 8.2 MG/DL (ref 8.5–10.1)
CALCIUM SERPL-MCNC: 9.4 MG/DL (ref 8.5–10.1)
CHLORIDE SERPL-SCNC: 94 MMOL/L (ref 97–108)
CHLORIDE SERPL-SCNC: 95 MMOL/L (ref 97–108)
CO2 SERPL-SCNC: 25 MMOL/L (ref 21–32)
CO2 SERPL-SCNC: 27 MMOL/L (ref 21–32)
CREAT SERPL-MCNC: 11.1 MG/DL (ref 0.7–1.3)
CREAT SERPL-MCNC: 12.1 MG/DL (ref 0.7–1.3)
DIFFERENTIAL METHOD BLD: ABNORMAL
DIFFERENTIAL METHOD BLD: ABNORMAL
EOSINOPHIL # BLD: 0.1 K/UL (ref 0–0.4)
EOSINOPHIL # BLD: 0.4 K/UL (ref 0–0.4)
EOSINOPHIL NFR BLD: 1 % (ref 0–7)
EOSINOPHIL NFR BLD: 4 % (ref 0–7)
ERYTHROCYTE [DISTWIDTH] IN BLOOD BY AUTOMATED COUNT: 13.3 % (ref 11.5–14.5)
ERYTHROCYTE [DISTWIDTH] IN BLOOD BY AUTOMATED COUNT: 13.4 % (ref 11.5–14.5)
GLOBULIN SER CALC-MCNC: 4.2 G/DL (ref 2–4)
GLOBULIN SER CALC-MCNC: 4.4 G/DL (ref 2–4)
GLUCOSE SERPL-MCNC: 85 MG/DL (ref 65–100)
GLUCOSE SERPL-MCNC: 86 MG/DL (ref 65–100)
HCT VFR BLD AUTO: 33.7 % (ref 36.6–50.3)
HCT VFR BLD AUTO: 34.4 % (ref 36.6–50.3)
HGB BLD-MCNC: 11.1 G/DL (ref 12.1–17)
HGB BLD-MCNC: 11.3 G/DL (ref 12.1–17)
IMM GRANULOCYTES # BLD AUTO: 0 K/UL (ref 0–0.04)
IMM GRANULOCYTES # BLD AUTO: 0 K/UL (ref 0–0.04)
IMM GRANULOCYTES NFR BLD AUTO: 0 % (ref 0–0.5)
IMM GRANULOCYTES NFR BLD AUTO: 0 % (ref 0–0.5)
INR PPP: 1.1 (ref 0.9–1.1)
LYMPHOCYTES # BLD: 0.5 K/UL (ref 0.8–3.5)
LYMPHOCYTES # BLD: 1 K/UL (ref 0.8–3.5)
LYMPHOCYTES NFR BLD: 12 % (ref 12–49)
LYMPHOCYTES NFR BLD: 6 % (ref 12–49)
MCH RBC QN AUTO: 30.3 PG (ref 26–34)
MCH RBC QN AUTO: 30.7 PG (ref 26–34)
MCHC RBC AUTO-ENTMCNC: 32.8 G/DL (ref 30–36.5)
MCHC RBC AUTO-ENTMCNC: 32.9 G/DL (ref 30–36.5)
MCV RBC AUTO: 92.2 FL (ref 80–99)
MCV RBC AUTO: 93.1 FL (ref 80–99)
MONOCYTES # BLD: 0.3 K/UL (ref 0–1)
MONOCYTES # BLD: 0.9 K/UL (ref 0–1)
MONOCYTES NFR BLD: 10 % (ref 5–13)
MONOCYTES NFR BLD: 3 % (ref 5–13)
NEUTS SEG # BLD: 6.7 K/UL (ref 1.8–8)
NEUTS SEG # BLD: 7.6 K/UL (ref 1.8–8)
NEUTS SEG NFR BLD: 74 % (ref 32–75)
NEUTS SEG NFR BLD: 89 % (ref 32–75)
NRBC # BLD: 0 K/UL (ref 0–0.01)
NRBC # BLD: 0 K/UL (ref 0–0.01)
NRBC BLD-RTO: 0 PER 100 WBC
NRBC BLD-RTO: 0 PER 100 WBC
PLATELET # BLD AUTO: 121 K/UL (ref 150–400)
PLATELET # BLD AUTO: 129 K/UL (ref 150–400)
PMV BLD AUTO: 10.4 FL (ref 8.9–12.9)
PMV BLD AUTO: 11 FL (ref 8.9–12.9)
POTASSIUM SERPL-SCNC: 4.3 MMOL/L (ref 3.5–5.1)
POTASSIUM SERPL-SCNC: 4.6 MMOL/L (ref 3.5–5.1)
PROT SERPL-MCNC: 7.4 G/DL (ref 6.4–8.2)
PROT SERPL-MCNC: 7.8 G/DL (ref 6.4–8.2)
PROTHROMBIN TIME: 11 SEC (ref 9–11.1)
RBC # BLD AUTO: 3.62 M/UL (ref 4.1–5.7)
RBC # BLD AUTO: 3.73 M/UL (ref 4.1–5.7)
RBC MORPH BLD: ABNORMAL
SODIUM SERPL-SCNC: 127 MMOL/L (ref 136–145)
SODIUM SERPL-SCNC: 130 MMOL/L (ref 136–145)
THERAPEUTIC RANGE: NORMAL SECS (ref 58–77)
WBC # BLD AUTO: 8.6 K/UL (ref 4.1–11.1)
WBC # BLD AUTO: 9.1 K/UL (ref 4.1–11.1)

## 2023-05-09 PROCEDURE — 93926 LOWER EXTREMITY STUDY: CPT

## 2023-05-09 PROCEDURE — 36415 COLL VENOUS BLD VENIPUNCTURE: CPT

## 2023-05-09 PROCEDURE — 90935 HEMODIALYSIS ONE EVALUATION: CPT

## 2023-05-09 PROCEDURE — 6370000000 HC RX 637 (ALT 250 FOR IP): Performed by: STUDENT IN AN ORGANIZED HEALTH CARE EDUCATION/TRAINING PROGRAM

## 2023-05-09 PROCEDURE — 6360000002 HC RX W HCPCS: Performed by: STUDENT IN AN ORGANIZED HEALTH CARE EDUCATION/TRAINING PROGRAM

## 2023-05-09 PROCEDURE — 2500000003 HC RX 250 WO HCPCS: Performed by: STUDENT IN AN ORGANIZED HEALTH CARE EDUCATION/TRAINING PROGRAM

## 2023-05-09 PROCEDURE — 85025 COMPLETE CBC W/AUTO DIFF WBC: CPT

## 2023-05-09 PROCEDURE — 2580000003 HC RX 258: Performed by: STUDENT IN AN ORGANIZED HEALTH CARE EDUCATION/TRAINING PROGRAM

## 2023-05-09 PROCEDURE — 6370000000 HC RX 637 (ALT 250 FOR IP): Performed by: NURSE PRACTITIONER

## 2023-05-09 PROCEDURE — 5A1D70Z PERFORMANCE OF URINARY FILTRATION, INTERMITTENT, LESS THAN 6 HOURS PER DAY: ICD-10-PCS | Performed by: INTERNAL MEDICINE

## 2023-05-09 PROCEDURE — 80053 COMPREHEN METABOLIC PANEL: CPT

## 2023-05-09 PROCEDURE — 93922 UPR/L XTREMITY ART 2 LEVELS: CPT

## 2023-05-09 PROCEDURE — 6360000002 HC RX W HCPCS: Performed by: EMERGENCY MEDICINE

## 2023-05-09 PROCEDURE — 6360000002 HC RX W HCPCS: Performed by: NURSE PRACTITIONER

## 2023-05-09 PROCEDURE — 96374 THER/PROPH/DIAG INJ IV PUSH: CPT

## 2023-05-09 PROCEDURE — 1170000000 HC RM PRIVATE ONCOLOGY

## 2023-05-09 RX ORDER — ALUMINA, MAGNESIA, AND SIMETHICONE 2400; 2400; 240 MG/30ML; MG/30ML; MG/30ML
15 SUSPENSION ORAL EVERY 6 HOURS PRN
Status: DISCONTINUED | OUTPATIENT
Start: 2023-05-09 | End: 2023-05-27 | Stop reason: HOSPADM

## 2023-05-09 RX ORDER — SODIUM CHLORIDE 9 MG/ML
INJECTION, SOLUTION INTRAVENOUS PRN
Status: DISCONTINUED | OUTPATIENT
Start: 2023-05-09 | End: 2023-05-16

## 2023-05-09 RX ORDER — LANOLIN ALCOHOL/MO/W.PET/CERES
3 CREAM (GRAM) TOPICAL NIGHTLY PRN
Status: DISCONTINUED | OUTPATIENT
Start: 2023-05-09 | End: 2023-05-12

## 2023-05-09 RX ORDER — SENNA AND DOCUSATE SODIUM 50; 8.6 MG/1; MG/1
2 TABLET, FILM COATED ORAL 2 TIMES DAILY
Status: DISCONTINUED | OUTPATIENT
Start: 2023-05-09 | End: 2023-05-27 | Stop reason: HOSPADM

## 2023-05-09 RX ORDER — ACETAMINOPHEN 325 MG/1
650 TABLET ORAL EVERY 6 HOURS PRN
Status: DISCONTINUED | OUTPATIENT
Start: 2023-05-09 | End: 2023-05-12

## 2023-05-09 RX ORDER — ASPIRIN 81 MG/1
81 TABLET ORAL DAILY
Status: DISCONTINUED | OUTPATIENT
Start: 2023-05-10 | End: 2023-05-27 | Stop reason: HOSPADM

## 2023-05-09 RX ORDER — SODIUM CHLORIDE 0.9 % (FLUSH) 0.9 %
5-40 SYRINGE (ML) INJECTION EVERY 12 HOURS SCHEDULED
Status: DISCONTINUED | OUTPATIENT
Start: 2023-05-09 | End: 2023-05-12

## 2023-05-09 RX ORDER — METOCLOPRAMIDE 5 MG/1
5 TABLET ORAL 2 TIMES DAILY PRN
COMMUNITY
Start: 2023-03-20

## 2023-05-09 RX ORDER — HYDRALAZINE HYDROCHLORIDE 20 MG/ML
10 INJECTION INTRAMUSCULAR; INTRAVENOUS EVERY 6 HOURS PRN
Status: DISCONTINUED | OUTPATIENT
Start: 2023-05-09 | End: 2023-05-27 | Stop reason: HOSPADM

## 2023-05-09 RX ORDER — SEVELAMER CARBONATE 800 MG/1
1600 TABLET, FILM COATED ORAL 3 TIMES DAILY
Status: DISCONTINUED | OUTPATIENT
Start: 2023-05-09 | End: 2023-05-27 | Stop reason: HOSPADM

## 2023-05-09 RX ORDER — HYDROMORPHONE HYDROCHLORIDE 1 MG/ML
0.5 INJECTION, SOLUTION INTRAMUSCULAR; INTRAVENOUS; SUBCUTANEOUS EVERY 4 HOURS PRN
Status: DISCONTINUED | OUTPATIENT
Start: 2023-05-09 | End: 2023-05-12

## 2023-05-09 RX ORDER — ATORVASTATIN CALCIUM 20 MG/1
20 TABLET, FILM COATED ORAL DAILY
Status: DISCONTINUED | OUTPATIENT
Start: 2023-05-09 | End: 2023-05-27 | Stop reason: HOSPADM

## 2023-05-09 RX ORDER — CALCITRIOL 0.25 UG/1
0.5 CAPSULE, LIQUID FILLED ORAL DAILY
Status: DISCONTINUED | OUTPATIENT
Start: 2023-05-09 | End: 2023-05-27 | Stop reason: HOSPADM

## 2023-05-09 RX ORDER — DIPHENHYDRAMINE HYDROCHLORIDE 50 MG/ML
50 INJECTION INTRAMUSCULAR; INTRAVENOUS ONCE
Status: COMPLETED | OUTPATIENT
Start: 2023-05-09 | End: 2023-05-09

## 2023-05-09 RX ORDER — HYDROXYZINE HYDROCHLORIDE 10 MG/1
10 TABLET, FILM COATED ORAL 3 TIMES DAILY PRN
Status: DISCONTINUED | OUTPATIENT
Start: 2023-05-09 | End: 2023-05-19

## 2023-05-09 RX ORDER — ONDANSETRON 4 MG/1
4 TABLET, ORALLY DISINTEGRATING ORAL EVERY 8 HOURS PRN
Status: DISCONTINUED | OUTPATIENT
Start: 2023-05-09 | End: 2023-05-27 | Stop reason: HOSPADM

## 2023-05-09 RX ORDER — FENTANYL CITRATE 50 UG/ML
25 INJECTION, SOLUTION INTRAMUSCULAR; INTRAVENOUS EVERY 6 HOURS PRN
Status: DISCONTINUED | OUTPATIENT
Start: 2023-05-09 | End: 2023-05-11

## 2023-05-09 RX ORDER — SODIUM CHLORIDE 0.9 % (FLUSH) 0.9 %
5-40 SYRINGE (ML) INJECTION PRN
Status: DISCONTINUED | OUTPATIENT
Start: 2023-05-09 | End: 2023-05-27 | Stop reason: HOSPADM

## 2023-05-09 RX ORDER — METOPROLOL SUCCINATE 25 MG/1
12.5 TABLET, EXTENDED RELEASE ORAL DAILY
Status: DISCONTINUED | OUTPATIENT
Start: 2023-05-09 | End: 2023-05-20

## 2023-05-09 RX ORDER — TRAMADOL HYDROCHLORIDE 50 MG/1
50 TABLET ORAL EVERY 6 HOURS PRN
Status: DISCONTINUED | OUTPATIENT
Start: 2023-05-09 | End: 2023-05-12

## 2023-05-09 RX ORDER — OXYCODONE HYDROCHLORIDE 10 MG/1
1 TABLET ORAL EVERY 4 HOURS PRN
COMMUNITY
Start: 2023-04-29

## 2023-05-09 RX ORDER — ACETAMINOPHEN 650 MG/1
650 SUPPOSITORY RECTAL EVERY 6 HOURS PRN
Status: DISCONTINUED | OUTPATIENT
Start: 2023-05-09 | End: 2023-05-12

## 2023-05-09 RX ORDER — DIPHENHYDRAMINE HYDROCHLORIDE 50 MG/ML
50 INJECTION INTRAMUSCULAR; INTRAVENOUS ONCE
Status: DISCONTINUED | OUTPATIENT
Start: 2023-05-09 | End: 2023-05-12

## 2023-05-09 RX ORDER — PANTOPRAZOLE SODIUM 40 MG/1
40 TABLET, DELAYED RELEASE ORAL
Status: DISCONTINUED | OUTPATIENT
Start: 2023-05-09 | End: 2023-05-27 | Stop reason: HOSPADM

## 2023-05-09 RX ORDER — ONDANSETRON 2 MG/ML
4 INJECTION INTRAMUSCULAR; INTRAVENOUS EVERY 6 HOURS PRN
Status: DISCONTINUED | OUTPATIENT
Start: 2023-05-09 | End: 2023-05-27 | Stop reason: HOSPADM

## 2023-05-09 RX ORDER — CINACALCET 60 MG/1
1 TABLET, FILM COATED ORAL DAILY
COMMUNITY
Start: 2020-02-19

## 2023-05-09 RX ORDER — POLYETHYLENE GLYCOL 3350 17 G/17G
17 POWDER, FOR SOLUTION ORAL DAILY PRN
Status: DISCONTINUED | OUTPATIENT
Start: 2023-05-09 | End: 2023-05-16

## 2023-05-09 RX ORDER — CINACALCET 30 MG/1
60 TABLET, FILM COATED ORAL DAILY
Status: DISCONTINUED | OUTPATIENT
Start: 2023-05-09 | End: 2023-05-23

## 2023-05-09 RX ORDER — DICYCLOMINE HCL 20 MG
20 TABLET ORAL EVERY 8 HOURS PRN
Status: DISCONTINUED | OUTPATIENT
Start: 2023-05-09 | End: 2023-05-27 | Stop reason: HOSPADM

## 2023-05-09 RX ORDER — OXYCODONE HYDROCHLORIDE 5 MG/1
10 TABLET ORAL EVERY 4 HOURS PRN
Status: DISCONTINUED | OUTPATIENT
Start: 2023-05-09 | End: 2023-05-24

## 2023-05-09 RX ORDER — FENTANYL CITRATE 50 UG/ML
50 INJECTION, SOLUTION INTRAMUSCULAR; INTRAVENOUS
Status: COMPLETED | OUTPATIENT
Start: 2023-05-09 | End: 2023-05-09

## 2023-05-09 RX ADMIN — HYDROMORPHONE HYDROCHLORIDE 0.5 MG: 1 INJECTION, SOLUTION INTRAMUSCULAR; INTRAVENOUS; SUBCUTANEOUS at 11:05

## 2023-05-09 RX ADMIN — HYDROMORPHONE HYDROCHLORIDE 0.5 MG: 1 INJECTION, SOLUTION INTRAMUSCULAR; INTRAVENOUS; SUBCUTANEOUS at 15:13

## 2023-05-09 RX ADMIN — ATORVASTATIN CALCIUM 20 MG: 20 TABLET, FILM COATED ORAL at 08:22

## 2023-05-09 RX ADMIN — FENTANYL CITRATE 25 MCG: 50 INJECTION INTRAMUSCULAR; INTRAVENOUS at 19:54

## 2023-05-09 RX ADMIN — SEVELAMER CARBONATE 1600 MG: 800 TABLET, FILM COATED ORAL at 17:34

## 2023-05-09 RX ADMIN — SERTRALINE 50 MG: 50 TABLET, FILM COATED ORAL at 11:18

## 2023-05-09 RX ADMIN — HYDROMORPHONE HYDROCHLORIDE 0.5 MG: 1 INJECTION, SOLUTION INTRAMUSCULAR; INTRAVENOUS; SUBCUTANEOUS at 06:08

## 2023-05-09 RX ADMIN — CALCITRIOL CAPSULES 0.25 MCG 0.5 MCG: 0.25 CAPSULE ORAL at 11:18

## 2023-05-09 RX ADMIN — PREDNISONE 50 MG: 5 TABLET ORAL at 17:33

## 2023-05-09 RX ADMIN — PREDNISONE 50 MG: 5 TABLET ORAL at 19:52

## 2023-05-09 RX ADMIN — FENTANYL CITRATE 25 MCG: 50 INJECTION, SOLUTION INTRAMUSCULAR; INTRAVENOUS at 00:01

## 2023-05-09 RX ADMIN — PANTOPRAZOLE SODIUM 40 MG: 40 TABLET, DELAYED RELEASE ORAL at 17:34

## 2023-05-09 RX ADMIN — TRAMADOL HYDROCHLORIDE 50 MG: 50 TABLET ORAL at 04:00

## 2023-05-09 RX ADMIN — DOCUSATE SODIUM - SENNOSIDES 2 TABLET: 50; 8.6 TABLET, FILM COATED ORAL at 20:32

## 2023-05-09 RX ADMIN — OXYCODONE 10 MG: 5 TABLET ORAL at 22:51

## 2023-05-09 RX ADMIN — PREDNISONE 50 MG: 5 TABLET ORAL at 08:22

## 2023-05-09 RX ADMIN — SEVELAMER CARBONATE 1600 MG: 800 TABLET, FILM COATED ORAL at 20:32

## 2023-05-09 RX ADMIN — FENTANYL CITRATE 50 MCG: 50 INJECTION, SOLUTION INTRAMUSCULAR; INTRAVENOUS at 02:38

## 2023-05-09 RX ADMIN — METOPROLOL SUCCINATE 12.5 MG: 25 TABLET, EXTENDED RELEASE ORAL at 08:21

## 2023-05-09 RX ADMIN — DIPHENHYDRAMINE HYDROCHLORIDE 50 MG: 50 INJECTION, SOLUTION INTRAMUSCULAR; INTRAVENOUS at 20:42

## 2023-05-09 RX ADMIN — SODIUM CHLORIDE, PRESERVATIVE FREE 10 ML: 5 INJECTION INTRAVENOUS at 11:09

## 2023-05-09 RX ADMIN — CINACALCET 60 MG: 30 TABLET, FILM COATED ORAL at 08:20

## 2023-05-09 RX ADMIN — PANTOPRAZOLE SODIUM 40 MG: 40 TABLET, DELAYED RELEASE ORAL at 08:22

## 2023-05-09 RX ADMIN — OXYCODONE 10 MG: 5 TABLET ORAL at 17:33

## 2023-05-09 RX ADMIN — SODIUM CHLORIDE, PRESERVATIVE FREE 5 ML: 5 INJECTION INTRAVENOUS at 20:36

## 2023-05-09 RX ADMIN — OXYCODONE 10 MG: 5 TABLET ORAL at 08:21

## 2023-05-09 RX ADMIN — SEVELAMER CARBONATE 1600 MG: 800 TABLET, FILM COATED ORAL at 08:20

## 2023-05-09 ASSESSMENT — PAIN SCALES - GENERAL
PAINLEVEL_OUTOF10: 5
PAINLEVEL_OUTOF10: 7
PAINLEVEL_OUTOF10: 7
PAINLEVEL_OUTOF10: 8
PAINLEVEL_OUTOF10: 2
PAINLEVEL_OUTOF10: 8
PAINLEVEL_OUTOF10: 7
PAINLEVEL_OUTOF10: 7
PAINLEVEL_OUTOF10: 6
PAINLEVEL_OUTOF10: 3
PAINLEVEL_OUTOF10: 7
PAINLEVEL_OUTOF10: 4
PAINLEVEL_OUTOF10: 5

## 2023-05-09 ASSESSMENT — PAIN DESCRIPTION - DESCRIPTORS
DESCRIPTORS: ACHING
DESCRIPTORS: THROBBING;SHARP
DESCRIPTORS: ACHING
DESCRIPTORS: ACHING;THROBBING
DESCRIPTORS: ACHING

## 2023-05-09 ASSESSMENT — PAIN DESCRIPTION - LOCATION
LOCATION: LEG

## 2023-05-09 ASSESSMENT — PAIN DESCRIPTION - ORIENTATION
ORIENTATION: LEFT
ORIENTATION: RIGHT
ORIENTATION: LEFT

## 2023-05-09 ASSESSMENT — PAIN - FUNCTIONAL ASSESSMENT: PAIN_FUNCTIONAL_ASSESSMENT: INTOLERABLE, UNABLE TO DO ANY ACTIVE OR PASSIVE ACTIVITIES

## 2023-05-09 ASSESSMENT — ENCOUNTER SYMPTOMS
SHORTNESS OF BREATH: 0
COLOR CHANGE: 0
VOMITING: 0
GASTROINTESTINAL NEGATIVE: 1
NAUSEA: 0
RESPIRATORY NEGATIVE: 1
COUGH: 0

## 2023-05-10 LAB
ANION GAP SERPL CALC-SCNC: 10 MMOL/L (ref 5–15)
BUN SERPL-MCNC: 26 MG/DL (ref 6–20)
BUN/CREAT SERPL: 4 (ref 12–20)
CALCIUM SERPL-MCNC: 7.8 MG/DL (ref 8.5–10.1)
CALCIUM SERPL-MCNC: 7.8 MG/DL (ref 8.5–10.1)
CHLORIDE SERPL-SCNC: 93 MMOL/L (ref 97–108)
CO2 SERPL-SCNC: 31 MMOL/L (ref 21–32)
CREAT SERPL-MCNC: 6.57 MG/DL (ref 0.7–1.3)
ERYTHROCYTE [DISTWIDTH] IN BLOOD BY AUTOMATED COUNT: 13.4 % (ref 11.5–14.5)
GLUCOSE SERPL-MCNC: 123 MG/DL (ref 65–100)
HCT VFR BLD AUTO: 38.2 % (ref 36.6–50.3)
HGB BLD-MCNC: 12.3 G/DL (ref 12.1–17)
MCH RBC QN AUTO: 30.3 PG (ref 26–34)
MCHC RBC AUTO-ENTMCNC: 32.2 G/DL (ref 30–36.5)
MCV RBC AUTO: 94.1 FL (ref 80–99)
NRBC # BLD: 0 K/UL (ref 0–0.01)
NRBC BLD-RTO: 0 PER 100 WBC
PLATELET # BLD AUTO: 116 K/UL (ref 150–400)
PMV BLD AUTO: 10.5 FL (ref 8.9–12.9)
POTASSIUM SERPL-SCNC: 4.1 MMOL/L (ref 3.5–5.1)
PTH-INTACT SERPL-MCNC: 850.2 PG/ML (ref 18.4–88)
RBC # BLD AUTO: 4.06 M/UL (ref 4.1–5.7)
SODIUM SERPL-SCNC: 134 MMOL/L (ref 136–145)
WBC # BLD AUTO: 6.9 K/UL (ref 4.1–11.1)

## 2023-05-10 PROCEDURE — 6370000000 HC RX 637 (ALT 250 FOR IP): Performed by: NURSE PRACTITIONER

## 2023-05-10 PROCEDURE — 83970 ASSAY OF PARATHORMONE: CPT

## 2023-05-10 PROCEDURE — 1170000000 HC RM PRIVATE ONCOLOGY

## 2023-05-10 PROCEDURE — 6370000000 HC RX 637 (ALT 250 FOR IP): Performed by: STUDENT IN AN ORGANIZED HEALTH CARE EDUCATION/TRAINING PROGRAM

## 2023-05-10 PROCEDURE — 36415 COLL VENOUS BLD VENIPUNCTURE: CPT

## 2023-05-10 PROCEDURE — 85027 COMPLETE CBC AUTOMATED: CPT

## 2023-05-10 PROCEDURE — 6360000002 HC RX W HCPCS: Performed by: NURSE PRACTITIONER

## 2023-05-10 PROCEDURE — 2500000003 HC RX 250 WO HCPCS: Performed by: STUDENT IN AN ORGANIZED HEALTH CARE EDUCATION/TRAINING PROGRAM

## 2023-05-10 PROCEDURE — 2580000003 HC RX 258: Performed by: STUDENT IN AN ORGANIZED HEALTH CARE EDUCATION/TRAINING PROGRAM

## 2023-05-10 PROCEDURE — 80048 BASIC METABOLIC PNL TOTAL CA: CPT

## 2023-05-10 RX ADMIN — OXYCODONE 10 MG: 5 TABLET ORAL at 11:15

## 2023-05-10 RX ADMIN — ATORVASTATIN CALCIUM 20 MG: 20 TABLET, FILM COATED ORAL at 09:56

## 2023-05-10 RX ADMIN — HYDROXYZINE HYDROCHLORIDE 10 MG: 10 TABLET ORAL at 21:14

## 2023-05-10 RX ADMIN — SEVELAMER CARBONATE 1600 MG: 800 TABLET, FILM COATED ORAL at 09:55

## 2023-05-10 RX ADMIN — FENTANYL CITRATE 25 MCG: 50 INJECTION INTRAMUSCULAR; INTRAVENOUS at 09:57

## 2023-05-10 RX ADMIN — SERTRALINE 50 MG: 50 TABLET, FILM COATED ORAL at 09:56

## 2023-05-10 RX ADMIN — DOCUSATE SODIUM - SENNOSIDES 2 TABLET: 50; 8.6 TABLET, FILM COATED ORAL at 09:56

## 2023-05-10 RX ADMIN — HYDROMORPHONE HYDROCHLORIDE 0.5 MG: 1 INJECTION, SOLUTION INTRAMUSCULAR; INTRAVENOUS; SUBCUTANEOUS at 21:22

## 2023-05-10 RX ADMIN — SODIUM CHLORIDE, PRESERVATIVE FREE 10 ML: 5 INJECTION INTRAVENOUS at 10:00

## 2023-05-10 RX ADMIN — HYDROMORPHONE HYDROCHLORIDE 0.5 MG: 1 INJECTION, SOLUTION INTRAMUSCULAR; INTRAVENOUS; SUBCUTANEOUS at 00:34

## 2023-05-10 RX ADMIN — SEVELAMER CARBONATE 1600 MG: 800 TABLET, FILM COATED ORAL at 13:05

## 2023-05-10 RX ADMIN — OXYCODONE 10 MG: 5 TABLET ORAL at 06:04

## 2023-05-10 RX ADMIN — CINACALCET 60 MG: 30 TABLET, FILM COATED ORAL at 09:56

## 2023-05-10 RX ADMIN — HYDROMORPHONE HYDROCHLORIDE 0.5 MG: 1 INJECTION, SOLUTION INTRAMUSCULAR; INTRAVENOUS; SUBCUTANEOUS at 17:19

## 2023-05-10 RX ADMIN — HYDROMORPHONE HYDROCHLORIDE 0.5 MG: 1 INJECTION, SOLUTION INTRAMUSCULAR; INTRAVENOUS; SUBCUTANEOUS at 13:05

## 2023-05-10 RX ADMIN — SODIUM CHLORIDE, PRESERVATIVE FREE 10 ML: 5 INJECTION INTRAVENOUS at 21:00

## 2023-05-10 RX ADMIN — PANTOPRAZOLE SODIUM 40 MG: 40 TABLET, DELAYED RELEASE ORAL at 09:56

## 2023-05-10 RX ADMIN — PANTOPRAZOLE SODIUM 40 MG: 40 TABLET, DELAYED RELEASE ORAL at 17:19

## 2023-05-10 RX ADMIN — ASPIRIN 81 MG: 81 TABLET, COATED ORAL at 09:56

## 2023-05-10 RX ADMIN — DOCUSATE SODIUM - SENNOSIDES 2 TABLET: 50; 8.6 TABLET, FILM COATED ORAL at 21:14

## 2023-05-10 RX ADMIN — SEVELAMER CARBONATE 1600 MG: 800 TABLET, FILM COATED ORAL at 21:14

## 2023-05-10 RX ADMIN — CALCITRIOL CAPSULES 0.25 MCG 0.5 MCG: 0.25 CAPSULE ORAL at 09:56

## 2023-05-10 ASSESSMENT — PAIN DESCRIPTION - LOCATION
LOCATION: LEG

## 2023-05-10 ASSESSMENT — PAIN SCALES - GENERAL
PAINLEVEL_OUTOF10: 7
PAINLEVEL_OUTOF10: 7
PAINLEVEL_OUTOF10: 4
PAINLEVEL_OUTOF10: 3
PAINLEVEL_OUTOF10: 7
PAINLEVEL_OUTOF10: 0
PAINLEVEL_OUTOF10: 7
PAINLEVEL_OUTOF10: 4
PAINLEVEL_OUTOF10: 8
PAINLEVEL_OUTOF10: 2
PAINLEVEL_OUTOF10: 4
PAINLEVEL_OUTOF10: 4

## 2023-05-10 ASSESSMENT — PAIN DESCRIPTION - ORIENTATION
ORIENTATION: LEFT

## 2023-05-10 ASSESSMENT — PAIN SCALES - WONG BAKER: WONGBAKER_NUMERICALRESPONSE: 0

## 2023-05-10 ASSESSMENT — PAIN DESCRIPTION - DESCRIPTORS
DESCRIPTORS: ACHING
DESCRIPTORS: STABBING
DESCRIPTORS: ACHING
DESCRIPTORS: ACHING
DESCRIPTORS: ACHING;DISCOMFORT
DESCRIPTORS: ACHING

## 2023-05-11 ENCOUNTER — APPOINTMENT (OUTPATIENT)
Facility: HOSPITAL | Age: 48
DRG: 270 | End: 2023-05-11
Payer: MEDICARE

## 2023-05-11 ENCOUNTER — ANESTHESIA EVENT (OUTPATIENT)
Facility: HOSPITAL | Age: 48
End: 2023-05-11
Payer: MEDICARE

## 2023-05-11 ENCOUNTER — ANESTHESIA (OUTPATIENT)
Facility: HOSPITAL | Age: 48
End: 2023-05-11
Payer: MEDICARE

## 2023-05-11 LAB
ANION GAP SERPL CALC-SCNC: 10 MMOL/L (ref 5–15)
BASOPHILS # BLD: 0 K/UL (ref 0–0.1)
BASOPHILS NFR BLD: 0 % (ref 0–1)
BUN SERPL-MCNC: 43 MG/DL (ref 6–20)
BUN/CREAT SERPL: 5 (ref 12–20)
CALCIUM SERPL-MCNC: 6.6 MG/DL (ref 8.5–10.1)
CHLORIDE SERPL-SCNC: 92 MMOL/L (ref 97–108)
CO2 SERPL-SCNC: 31 MMOL/L (ref 21–32)
CREAT SERPL-MCNC: 8.28 MG/DL (ref 0.7–1.3)
DIFFERENTIAL METHOD BLD: ABNORMAL
EOSINOPHIL # BLD: 0 K/UL (ref 0–0.4)
EOSINOPHIL NFR BLD: 0 % (ref 0–7)
ERYTHROCYTE [DISTWIDTH] IN BLOOD BY AUTOMATED COUNT: 13.4 % (ref 11.5–14.5)
GLUCOSE SERPL-MCNC: 100 MG/DL (ref 65–100)
HCT VFR BLD AUTO: 32.5 % (ref 36.6–50.3)
HGB BLD-MCNC: 10.6 G/DL (ref 12.1–17)
IMM GRANULOCYTES # BLD AUTO: 0 K/UL (ref 0–0.04)
IMM GRANULOCYTES NFR BLD AUTO: 0 % (ref 0–0.5)
LYMPHOCYTES # BLD: 1.3 K/UL (ref 0.8–3.5)
LYMPHOCYTES NFR BLD: 13 % (ref 12–49)
MCH RBC QN AUTO: 30.5 PG (ref 26–34)
MCHC RBC AUTO-ENTMCNC: 32.6 G/DL (ref 30–36.5)
MCV RBC AUTO: 93.4 FL (ref 80–99)
MONOCYTES # BLD: 1.2 K/UL (ref 0–1)
MONOCYTES NFR BLD: 12 % (ref 5–13)
NEUTS SEG # BLD: 7.6 K/UL (ref 1.8–8)
NEUTS SEG NFR BLD: 75 % (ref 32–75)
NRBC # BLD: 0 K/UL (ref 0–0.01)
NRBC BLD-RTO: 0 PER 100 WBC
PLATELET # BLD AUTO: 131 K/UL (ref 150–400)
PMV BLD AUTO: 10.2 FL (ref 8.9–12.9)
POTASSIUM SERPL-SCNC: 4 MMOL/L (ref 3.5–5.1)
RBC # BLD AUTO: 3.48 M/UL (ref 4.1–5.7)
SODIUM SERPL-SCNC: 133 MMOL/L (ref 136–145)
WBC # BLD AUTO: 10.1 K/UL (ref 4.1–11.1)

## 2023-05-11 PROCEDURE — 6370000000 HC RX 637 (ALT 250 FOR IP): Performed by: STUDENT IN AN ORGANIZED HEALTH CARE EDUCATION/TRAINING PROGRAM

## 2023-05-11 PROCEDURE — 2709999900 HC NON-CHARGEABLE SUPPLY: Performed by: SURGERY

## 2023-05-11 PROCEDURE — 2500000003 HC RX 250 WO HCPCS: Performed by: STUDENT IN AN ORGANIZED HEALTH CARE EDUCATION/TRAINING PROGRAM

## 2023-05-11 PROCEDURE — 04CQ3ZZ EXTIRPATION OF MATTER FROM LEFT ANTERIOR TIBIAL ARTERY, PERCUTANEOUS APPROACH: ICD-10-PCS | Performed by: SURGERY

## 2023-05-11 PROCEDURE — 3700000001 HC ADD 15 MINUTES (ANESTHESIA): Performed by: SURGERY

## 2023-05-11 PROCEDURE — 2500000003 HC RX 250 WO HCPCS: Performed by: NURSE ANESTHETIST, CERTIFIED REGISTERED

## 2023-05-11 PROCEDURE — 6360000002 HC RX W HCPCS: Performed by: STUDENT IN AN ORGANIZED HEALTH CARE EDUCATION/TRAINING PROGRAM

## 2023-05-11 PROCEDURE — 2500000003 HC RX 250 WO HCPCS: Performed by: ANESTHESIOLOGY

## 2023-05-11 PROCEDURE — 6360000002 HC RX W HCPCS: Performed by: NURSE ANESTHETIST, CERTIFIED REGISTERED

## 2023-05-11 PROCEDURE — C1768 GRAFT, VASCULAR: HCPCS | Performed by: SURGERY

## 2023-05-11 PROCEDURE — 2580000003 HC RX 258: Performed by: NURSE ANESTHETIST, CERTIFIED REGISTERED

## 2023-05-11 PROCEDURE — 2580000003 HC RX 258: Performed by: STUDENT IN AN ORGANIZED HEALTH CARE EDUCATION/TRAINING PROGRAM

## 2023-05-11 PROCEDURE — 6370000000 HC RX 637 (ALT 250 FOR IP): Performed by: ANESTHESIOLOGY

## 2023-05-11 PROCEDURE — 85025 COMPLETE CBC W/AUTO DIFF WBC: CPT

## 2023-05-11 PROCEDURE — 2060000000 HC ICU INTERMEDIATE R&B

## 2023-05-11 PROCEDURE — 73552 X-RAY EXAM OF FEMUR 2/>: CPT

## 2023-05-11 PROCEDURE — C1894 INTRO/SHEATH, NON-LASER: HCPCS | Performed by: SURGERY

## 2023-05-11 PROCEDURE — 7100000000 HC PACU RECOVERY - FIRST 15 MIN: Performed by: SURGERY

## 2023-05-11 PROCEDURE — 6360000004 HC RX CONTRAST MEDICATION: Performed by: SURGERY

## 2023-05-11 PROCEDURE — 04RL0JZ REPLACEMENT OF LEFT FEMORAL ARTERY WITH SYNTHETIC SUBSTITUTE, OPEN APPROACH: ICD-10-PCS | Performed by: SURGERY

## 2023-05-11 PROCEDURE — 36415 COLL VENOUS BLD VENIPUNCTURE: CPT

## 2023-05-11 PROCEDURE — 3600000004 HC SURGERY LEVEL 4 BASE: Performed by: SURGERY

## 2023-05-11 PROCEDURE — 6360000002 HC RX W HCPCS: Performed by: ANESTHESIOLOGY

## 2023-05-11 PROCEDURE — 76000 FLUOROSCOPY <1 HR PHYS/QHP: CPT

## 2023-05-11 PROCEDURE — 6370000000 HC RX 637 (ALT 250 FOR IP): Performed by: SURGERY

## 2023-05-11 PROCEDURE — 80048 BASIC METABOLIC PNL TOTAL CA: CPT

## 2023-05-11 PROCEDURE — 90935 HEMODIALYSIS ONE EVALUATION: CPT

## 2023-05-11 PROCEDURE — C1757 CATH, THROMBECTOMY/EMBOLECT: HCPCS | Performed by: SURGERY

## 2023-05-11 PROCEDURE — 6360000002 HC RX W HCPCS: Performed by: NURSE PRACTITIONER

## 2023-05-11 PROCEDURE — 7100000001 HC PACU RECOVERY - ADDTL 15 MIN: Performed by: SURGERY

## 2023-05-11 PROCEDURE — 3700000000 HC ANESTHESIA ATTENDED CARE: Performed by: SURGERY

## 2023-05-11 PROCEDURE — B41G1ZZ FLUOROSCOPY OF LEFT LOWER EXTREMITY ARTERIES USING LOW OSMOLAR CONTRAST: ICD-10-PCS | Performed by: SURGERY

## 2023-05-11 PROCEDURE — P9047 ALBUMIN (HUMAN), 25%, 50ML: HCPCS | Performed by: NURSE PRACTITIONER

## 2023-05-11 PROCEDURE — 3600000014 HC SURGERY LEVEL 4 ADDTL 15MIN: Performed by: SURGERY

## 2023-05-11 DEVICE — DISTAFLO® BYPASS GRAFT WITH FLEX SMALL BEADING STANDARD CUFF, 6 MM X 80 CM
Type: IMPLANTABLE DEVICE | Site: LEG | Status: FUNCTIONAL
Brand: DISTAFLO® BYPASS GRAFTS

## 2023-05-11 RX ORDER — FENTANYL CITRATE 50 UG/ML
100 INJECTION, SOLUTION INTRAMUSCULAR; INTRAVENOUS
Status: DISCONTINUED | OUTPATIENT
Start: 2023-05-11 | End: 2023-05-11 | Stop reason: HOSPADM

## 2023-05-11 RX ORDER — SODIUM CHLORIDE 9 MG/ML
INJECTION, SOLUTION INTRAVENOUS PRN
Status: DISCONTINUED | OUTPATIENT
Start: 2023-05-11 | End: 2023-05-11 | Stop reason: HOSPADM

## 2023-05-11 RX ORDER — PROPOFOL 10 MG/ML
INJECTION, EMULSION INTRAVENOUS PRN
Status: DISCONTINUED | OUTPATIENT
Start: 2023-05-11 | End: 2023-05-12 | Stop reason: SDUPTHER

## 2023-05-11 RX ORDER — PROCHLORPERAZINE EDISYLATE 5 MG/ML
5 INJECTION INTRAMUSCULAR; INTRAVENOUS
Status: DISCONTINUED | OUTPATIENT
Start: 2023-05-11 | End: 2023-05-11 | Stop reason: HOSPADM

## 2023-05-11 RX ORDER — EPHEDRINE SULFATE/0.9% NACL/PF 50 MG/5 ML
SYRINGE (ML) INTRAVENOUS PRN
Status: DISCONTINUED | OUTPATIENT
Start: 2023-05-11 | End: 2023-05-12 | Stop reason: SDUPTHER

## 2023-05-11 RX ORDER — FENTANYL CITRATE 50 UG/ML
INJECTION, SOLUTION INTRAMUSCULAR; INTRAVENOUS PRN
Status: DISCONTINUED | OUTPATIENT
Start: 2023-05-11 | End: 2023-05-12 | Stop reason: SDUPTHER

## 2023-05-11 RX ORDER — GLYCOPYRROLATE 0.2 MG/ML
INJECTION INTRAMUSCULAR; INTRAVENOUS PRN
Status: DISCONTINUED | OUTPATIENT
Start: 2023-05-11 | End: 2023-05-12 | Stop reason: SDUPTHER

## 2023-05-11 RX ORDER — OXYCODONE HYDROCHLORIDE 5 MG/1
5 TABLET ORAL
Status: DISCONTINUED | OUTPATIENT
Start: 2023-05-11 | End: 2023-05-11 | Stop reason: HOSPADM

## 2023-05-11 RX ORDER — HYDRALAZINE HYDROCHLORIDE 20 MG/ML
10 INJECTION INTRAMUSCULAR; INTRAVENOUS
Status: DISCONTINUED | OUTPATIENT
Start: 2023-05-11 | End: 2023-05-11 | Stop reason: HOSPADM

## 2023-05-11 RX ORDER — ROCURONIUM BROMIDE 10 MG/ML
INJECTION, SOLUTION INTRAVENOUS PRN
Status: DISCONTINUED | OUTPATIENT
Start: 2023-05-11 | End: 2023-05-12 | Stop reason: SDUPTHER

## 2023-05-11 RX ORDER — ALBUMIN (HUMAN) 12.5 G/50ML
25 SOLUTION INTRAVENOUS ONCE
Status: COMPLETED | OUTPATIENT
Start: 2023-05-11 | End: 2023-05-11

## 2023-05-11 RX ORDER — ONDANSETRON 2 MG/ML
INJECTION INTRAMUSCULAR; INTRAVENOUS PRN
Status: DISCONTINUED | OUTPATIENT
Start: 2023-05-11 | End: 2023-05-12 | Stop reason: SDUPTHER

## 2023-05-11 RX ORDER — ACETAMINOPHEN 500 MG
1000 TABLET ORAL ONCE
Status: COMPLETED | OUTPATIENT
Start: 2023-05-11 | End: 2023-05-11

## 2023-05-11 RX ORDER — DIPHENHYDRAMINE HYDROCHLORIDE 50 MG/ML
INJECTION INTRAMUSCULAR; INTRAVENOUS PRN
Status: DISCONTINUED | OUTPATIENT
Start: 2023-05-11 | End: 2023-05-12 | Stop reason: SDUPTHER

## 2023-05-11 RX ORDER — ACETAMINOPHEN 325 MG/1
650 TABLET ORAL
Status: DISCONTINUED | OUTPATIENT
Start: 2023-05-11 | End: 2023-05-11 | Stop reason: HOSPADM

## 2023-05-11 RX ORDER — CEFAZOLIN SODIUM 1 G/3ML
INJECTION, POWDER, FOR SOLUTION INTRAMUSCULAR; INTRAVENOUS PRN
Status: DISCONTINUED | OUTPATIENT
Start: 2023-05-11 | End: 2023-05-12 | Stop reason: SDUPTHER

## 2023-05-11 RX ORDER — DEXAMETHASONE SODIUM PHOSPHATE 4 MG/ML
INJECTION, SOLUTION INTRA-ARTICULAR; INTRALESIONAL; INTRAMUSCULAR; INTRAVENOUS; SOFT TISSUE PRN
Status: DISCONTINUED | OUTPATIENT
Start: 2023-05-11 | End: 2023-05-12 | Stop reason: SDUPTHER

## 2023-05-11 RX ORDER — SODIUM CHLORIDE, SODIUM LACTATE, POTASSIUM CHLORIDE, CALCIUM CHLORIDE 600; 310; 30; 20 MG/100ML; MG/100ML; MG/100ML; MG/100ML
INJECTION, SOLUTION INTRAVENOUS CONTINUOUS
Status: DISCONTINUED | OUTPATIENT
Start: 2023-05-11 | End: 2023-05-11

## 2023-05-11 RX ORDER — FENTANYL CITRATE 50 UG/ML
25 INJECTION, SOLUTION INTRAMUSCULAR; INTRAVENOUS EVERY 5 MIN PRN
Status: COMPLETED | OUTPATIENT
Start: 2023-05-11 | End: 2023-05-11

## 2023-05-11 RX ORDER — SODIUM CHLORIDE 0.9 % (FLUSH) 0.9 %
5-40 SYRINGE (ML) INJECTION EVERY 12 HOURS SCHEDULED
Status: DISCONTINUED | OUTPATIENT
Start: 2023-05-11 | End: 2023-05-11 | Stop reason: HOSPADM

## 2023-05-11 RX ORDER — MIDAZOLAM HYDROCHLORIDE 1 MG/ML
INJECTION INTRAMUSCULAR; INTRAVENOUS PRN
Status: DISCONTINUED | OUTPATIENT
Start: 2023-05-11 | End: 2023-05-12 | Stop reason: SDUPTHER

## 2023-05-11 RX ORDER — SODIUM CHLORIDE 0.9 % (FLUSH) 0.9 %
5-40 SYRINGE (ML) INJECTION PRN
Status: DISCONTINUED | OUTPATIENT
Start: 2023-05-11 | End: 2023-05-11 | Stop reason: HOSPADM

## 2023-05-11 RX ORDER — LIDOCAINE HYDROCHLORIDE 20 MG/ML
INJECTION, SOLUTION EPIDURAL; INFILTRATION; INTRACAUDAL; PERINEURAL PRN
Status: DISCONTINUED | OUTPATIENT
Start: 2023-05-11 | End: 2023-05-12 | Stop reason: SDUPTHER

## 2023-05-11 RX ORDER — HYDROMORPHONE HYDROCHLORIDE 1 MG/ML
0.5 INJECTION, SOLUTION INTRAMUSCULAR; INTRAVENOUS; SUBCUTANEOUS EVERY 5 MIN PRN
Status: COMPLETED | OUTPATIENT
Start: 2023-05-11 | End: 2023-05-11

## 2023-05-11 RX ORDER — PHENYLEPHRINE HYDROCHLORIDE 10 MG/ML
INJECTION INTRAVENOUS
Status: DISCONTINUED
Start: 2023-05-11 | End: 2023-05-12

## 2023-05-11 RX ORDER — PHENYLEPHRINE HCL IN 0.9% NACL 0.4MG/10ML
SYRINGE (ML) INTRAVENOUS PRN
Status: DISCONTINUED | OUTPATIENT
Start: 2023-05-11 | End: 2023-05-12 | Stop reason: SDUPTHER

## 2023-05-11 RX ORDER — BIVALIRUDIN 250 MG/5ML
INJECTION, POWDER, LYOPHILIZED, FOR SOLUTION INTRAVENOUS PRN
Status: DISCONTINUED | OUTPATIENT
Start: 2023-05-11 | End: 2023-05-12 | Stop reason: SDUPTHER

## 2023-05-11 RX ORDER — ONDANSETRON 2 MG/ML
4 INJECTION INTRAMUSCULAR; INTRAVENOUS ONCE
Status: DISCONTINUED | OUTPATIENT
Start: 2023-05-11 | End: 2023-05-11 | Stop reason: HOSPADM

## 2023-05-11 RX ORDER — METHYLPREDNISOLONE SODIUM SUCCINATE 125 MG/2ML
INJECTION, POWDER, LYOPHILIZED, FOR SOLUTION INTRAMUSCULAR; INTRAVENOUS PRN
Status: DISCONTINUED | OUTPATIENT
Start: 2023-05-11 | End: 2023-05-12 | Stop reason: SDUPTHER

## 2023-05-11 RX ORDER — DEXAMETHASONE SODIUM PHOSPHATE 4 MG/ML
4 INJECTION, SOLUTION INTRA-ARTICULAR; INTRALESIONAL; INTRAMUSCULAR; INTRAVENOUS; SOFT TISSUE
Status: DISCONTINUED | OUTPATIENT
Start: 2023-05-11 | End: 2023-05-11 | Stop reason: HOSPADM

## 2023-05-11 RX ORDER — NEOSTIGMINE METHYLSULFATE 1 MG/ML
INJECTION, SOLUTION INTRAVENOUS PRN
Status: DISCONTINUED | OUTPATIENT
Start: 2023-05-11 | End: 2023-05-12 | Stop reason: SDUPTHER

## 2023-05-11 RX ORDER — MIDAZOLAM HYDROCHLORIDE 2 MG/2ML
2 INJECTION, SOLUTION INTRAMUSCULAR; INTRAVENOUS
Status: DISCONTINUED | OUTPATIENT
Start: 2023-05-11 | End: 2023-05-11 | Stop reason: HOSPADM

## 2023-05-11 RX ADMIN — FENTANYL CITRATE 25 MCG: 50 INJECTION, SOLUTION INTRAMUSCULAR; INTRAVENOUS at 21:34

## 2023-05-11 RX ADMIN — PHENYLEPHRINE HYDROCHLORIDE 30 MCG/MIN: 10 INJECTION INTRAVENOUS at 21:04

## 2023-05-11 RX ADMIN — OXYCODONE 10 MG: 5 TABLET ORAL at 12:59

## 2023-05-11 RX ADMIN — MIDAZOLAM HYDROCHLORIDE 1 MG: 1 INJECTION, SOLUTION INTRAMUSCULAR; INTRAVENOUS at 16:40

## 2023-05-11 RX ADMIN — LIDOCAINE HYDROCHLORIDE 40 MG: 20 INJECTION, SOLUTION EPIDURAL; INFILTRATION; INTRACAUDAL; PERINEURAL at 16:51

## 2023-05-11 RX ADMIN — ROCURONIUM BROMIDE 40 MG: 10 INJECTION INTRAVENOUS at 16:51

## 2023-05-11 RX ADMIN — SUGAMMADEX 200 MG: 100 INJECTION, SOLUTION INTRAVENOUS at 19:57

## 2023-05-11 RX ADMIN — HYDROMORPHONE HYDROCHLORIDE 0.5 MG: 1 INJECTION, SOLUTION INTRAMUSCULAR; INTRAVENOUS; SUBCUTANEOUS at 21:52

## 2023-05-11 RX ADMIN — DIPHENHYDRAMINE HYDROCHLORIDE 12.5 MG: 50 INJECTION, SOLUTION INTRAMUSCULAR; INTRAVENOUS at 16:51

## 2023-05-11 RX ADMIN — Medication 10 MG: at 17:36

## 2023-05-11 RX ADMIN — DEXAMETHASONE SODIUM PHOSPHATE 8 MG: 4 INJECTION, SOLUTION INTRAMUSCULAR; INTRAVENOUS at 17:31

## 2023-05-11 RX ADMIN — Medication 4 MG: at 17:56

## 2023-05-11 RX ADMIN — Medication 40 MCG: at 19:42

## 2023-05-11 RX ADMIN — CEFAZOLIN 2 G: 1 INJECTION, POWDER, FOR SOLUTION INTRAMUSCULAR; INTRAVENOUS; PARENTERAL at 17:08

## 2023-05-11 RX ADMIN — SODIUM CHLORIDE: 9 INJECTION, SOLUTION INTRAVENOUS at 15:08

## 2023-05-11 RX ADMIN — GLYCOPYRROLATE 0.7 MG: 0.2 INJECTION, SOLUTION INTRAMUSCULAR; INTRAVENOUS at 17:56

## 2023-05-11 RX ADMIN — FENTANYL CITRATE 25 MCG: 50 INJECTION, SOLUTION INTRAMUSCULAR; INTRAVENOUS at 21:25

## 2023-05-11 RX ADMIN — FENTANYL CITRATE 50 MCG: 50 INJECTION, SOLUTION INTRAMUSCULAR; INTRAVENOUS at 16:40

## 2023-05-11 RX ADMIN — DIPHENHYDRAMINE HYDROCHLORIDE 37.5 MG: 50 INJECTION, SOLUTION INTRAMUSCULAR; INTRAVENOUS at 17:32

## 2023-05-11 RX ADMIN — Medication 3 AMPULE: at 15:07

## 2023-05-11 RX ADMIN — Medication 80 MCG: at 18:57

## 2023-05-11 RX ADMIN — BIVALIRUDIN 2 MG/KG/HR: 250 INJECTION, POWDER, LYOPHILIZED, FOR SOLUTION INTRAVENOUS at 17:24

## 2023-05-11 RX ADMIN — ONDANSETRON HYDROCHLORIDE 4 MG: 2 INJECTION, SOLUTION INTRAMUSCULAR; INTRAVENOUS at 17:56

## 2023-05-11 RX ADMIN — PHENYLEPHRINE HYDROCHLORIDE 60 MCG/MIN: 10 INJECTION INTRAVENOUS at 17:37

## 2023-05-11 RX ADMIN — BIVALIRUDIN 59 MG: 250 INJECTION, POWDER, LYOPHILIZED, FOR SOLUTION INTRAVENOUS at 17:38

## 2023-05-11 RX ADMIN — FENTANYL CITRATE 25 MCG: 50 INJECTION, SOLUTION INTRAMUSCULAR; INTRAVENOUS at 21:46

## 2023-05-11 RX ADMIN — HYDROMORPHONE HYDROCHLORIDE 0.5 MG: 1 INJECTION, SOLUTION INTRAMUSCULAR; INTRAVENOUS; SUBCUTANEOUS at 10:03

## 2023-05-11 RX ADMIN — SODIUM CHLORIDE, PRESERVATIVE FREE 10 ML: 5 INJECTION INTRAVENOUS at 10:09

## 2023-05-11 RX ADMIN — Medication 40 MCG: at 19:45

## 2023-05-11 RX ADMIN — MELATONIN 3 MG: at 00:07

## 2023-05-11 RX ADMIN — FENTANYL CITRATE 50 MCG: 50 INJECTION, SOLUTION INTRAMUSCULAR; INTRAVENOUS at 17:56

## 2023-05-11 RX ADMIN — ACETAMINOPHEN 1000 MG: 500 TABLET ORAL at 15:47

## 2023-05-11 RX ADMIN — PROPOFOL 20 MG: 10 INJECTION, EMULSION INTRAVENOUS at 18:56

## 2023-05-11 RX ADMIN — PROPOFOL 50 MG: 10 INJECTION, EMULSION INTRAVENOUS at 16:51

## 2023-05-11 RX ADMIN — FENTANYL CITRATE 25 MCG: 50 INJECTION, SOLUTION INTRAMUSCULAR; INTRAVENOUS at 20:45

## 2023-05-11 RX ADMIN — FENTANYL CITRATE 25 MCG: 50 INJECTION INTRAMUSCULAR; INTRAVENOUS at 15:49

## 2023-05-11 RX ADMIN — ONDANSETRON 4 MG: 2 INJECTION INTRAMUSCULAR; INTRAVENOUS at 21:20

## 2023-05-11 RX ADMIN — HYDROMORPHONE HYDROCHLORIDE 0.5 MG: 1 INJECTION, SOLUTION INTRAMUSCULAR; INTRAVENOUS; SUBCUTANEOUS at 22:11

## 2023-05-11 RX ADMIN — SODIUM CHLORIDE: 9 INJECTION, SOLUTION INTRAVENOUS at 17:30

## 2023-05-11 RX ADMIN — HYDROMORPHONE HYDROCHLORIDE 0.5 MG: 1 INJECTION, SOLUTION INTRAMUSCULAR; INTRAVENOUS; SUBCUTANEOUS at 02:19

## 2023-05-11 RX ADMIN — ALBUMIN (HUMAN) 25 G: 0.25 INJECTION, SOLUTION INTRAVENOUS at 10:08

## 2023-05-11 RX ADMIN — METHYLPREDNISOLONE SODIUM SUCCINATE 125 MG: 125 INJECTION, POWDER, FOR SOLUTION INTRAMUSCULAR; INTRAVENOUS at 16:44

## 2023-05-11 RX ADMIN — Medication 80 MCG: at 17:58

## 2023-05-11 RX ADMIN — Medication 80 MCG: at 18:07

## 2023-05-11 ASSESSMENT — PAIN DESCRIPTION - DESCRIPTORS
DESCRIPTORS: THROBBING
DESCRIPTORS: ACHING
DESCRIPTORS: ACHING;CRAMPING
DESCRIPTORS: ACHING
DESCRIPTORS: ACHING
DESCRIPTORS: ACHING;THROBBING
DESCRIPTORS: ACHING

## 2023-05-11 ASSESSMENT — PAIN - FUNCTIONAL ASSESSMENT: PAIN_FUNCTIONAL_ASSESSMENT: 0-10

## 2023-05-11 ASSESSMENT — PAIN DESCRIPTION - LOCATION
LOCATION: BACK
LOCATION: ABDOMEN;BACK
LOCATION: ABDOMEN
LOCATION: LEG
LOCATION: ABDOMEN;BACK
LOCATION: KNEE
LOCATION: BACK
LOCATION: LEG
LOCATION: LEG;KNEE
LOCATION: KNEE
LOCATION: ABDOMEN;BACK

## 2023-05-11 ASSESSMENT — PAIN SCALES - GENERAL
PAINLEVEL_OUTOF10: 7
PAINLEVEL_OUTOF10: 8
PAINLEVEL_OUTOF10: 0
PAINLEVEL_OUTOF10: 7
PAINLEVEL_OUTOF10: 6
PAINLEVEL_OUTOF10: 7
PAINLEVEL_OUTOF10: 8
PAINLEVEL_OUTOF10: 7
PAINLEVEL_OUTOF10: 8
PAINLEVEL_OUTOF10: 7
PAINLEVEL_OUTOF10: 7

## 2023-05-11 ASSESSMENT — PAIN DESCRIPTION - PAIN TYPE
TYPE: ACUTE PAIN

## 2023-05-11 ASSESSMENT — PAIN DESCRIPTION - ORIENTATION
ORIENTATION: LEFT
ORIENTATION: RIGHT;LEFT
ORIENTATION: LOWER
ORIENTATION: LEFT;RIGHT;LOWER

## 2023-05-12 LAB
BASOPHILS # BLD: 0 K/UL (ref 0–0.1)
BASOPHILS NFR BLD: 0 % (ref 0–1)
DIFFERENTIAL METHOD BLD: ABNORMAL
EOSINOPHIL # BLD: 0 K/UL (ref 0–0.4)
EOSINOPHIL NFR BLD: 0 % (ref 0–7)
ERYTHROCYTE [DISTWIDTH] IN BLOOD BY AUTOMATED COUNT: 13.3 % (ref 11.5–14.5)
ERYTHROCYTE [DISTWIDTH] IN BLOOD BY AUTOMATED COUNT: 13.5 % (ref 11.5–14.5)
HCT VFR BLD AUTO: 27.1 % (ref 36.6–50.3)
HCT VFR BLD AUTO: 27.9 % (ref 36.6–50.3)
HGB BLD-MCNC: 8.4 G/DL (ref 12.1–17)
HGB BLD-MCNC: 8.8 G/DL (ref 12.1–17)
IMM GRANULOCYTES # BLD AUTO: 0 K/UL (ref 0–0.04)
IMM GRANULOCYTES NFR BLD AUTO: 0 % (ref 0–0.5)
LYMPHOCYTES # BLD: 0.3 K/UL (ref 0.8–3.5)
LYMPHOCYTES NFR BLD: 3 % (ref 12–49)
MCH RBC QN AUTO: 30.3 PG (ref 26–34)
MCH RBC QN AUTO: 30.7 PG (ref 26–34)
MCHC RBC AUTO-ENTMCNC: 31 G/DL (ref 30–36.5)
MCHC RBC AUTO-ENTMCNC: 31.5 G/DL (ref 30–36.5)
MCV RBC AUTO: 97.2 FL (ref 80–99)
MCV RBC AUTO: 97.8 FL (ref 80–99)
MONOCYTES # BLD: 0.5 K/UL (ref 0–1)
MONOCYTES NFR BLD: 5 % (ref 5–13)
NEUTS SEG # BLD: 8.4 K/UL (ref 1.8–8)
NEUTS SEG NFR BLD: 92 % (ref 32–75)
NRBC # BLD: 0 K/UL (ref 0–0.01)
NRBC # BLD: 0 K/UL (ref 0–0.01)
NRBC BLD-RTO: 0 PER 100 WBC
NRBC BLD-RTO: 0 PER 100 WBC
PLATELET # BLD AUTO: 111 K/UL (ref 150–400)
PLATELET # BLD AUTO: 125 K/UL (ref 150–400)
PMV BLD AUTO: 10.6 FL (ref 8.9–12.9)
PMV BLD AUTO: 11.1 FL (ref 8.9–12.9)
RBC # BLD AUTO: 2.77 M/UL (ref 4.1–5.7)
RBC # BLD AUTO: 2.87 M/UL (ref 4.1–5.7)
RBC MORPH BLD: ABNORMAL
WBC # BLD AUTO: 9.2 K/UL (ref 4.1–11.1)
WBC # BLD AUTO: 9.8 K/UL (ref 4.1–11.1)

## 2023-05-12 PROCEDURE — 85027 COMPLETE CBC AUTOMATED: CPT

## 2023-05-12 PROCEDURE — 2060000000 HC ICU INTERMEDIATE R&B

## 2023-05-12 PROCEDURE — 6360000002 HC RX W HCPCS: Performed by: STUDENT IN AN ORGANIZED HEALTH CARE EDUCATION/TRAINING PROGRAM

## 2023-05-12 PROCEDURE — 6370000000 HC RX 637 (ALT 250 FOR IP): Performed by: SURGERY

## 2023-05-12 PROCEDURE — 36415 COLL VENOUS BLD VENIPUNCTURE: CPT

## 2023-05-12 PROCEDURE — 6360000002 HC RX W HCPCS: Performed by: SURGERY

## 2023-05-12 PROCEDURE — 2500000003 HC RX 250 WO HCPCS: Performed by: NURSE PRACTITIONER

## 2023-05-12 PROCEDURE — 6360000002 HC RX W HCPCS: Performed by: NURSE PRACTITIONER

## 2023-05-12 PROCEDURE — 2580000003 HC RX 258: Performed by: SURGERY

## 2023-05-12 PROCEDURE — 6370000000 HC RX 637 (ALT 250 FOR IP): Performed by: NURSE PRACTITIONER

## 2023-05-12 PROCEDURE — P9047 ALBUMIN (HUMAN), 25%, 50ML: HCPCS | Performed by: STUDENT IN AN ORGANIZED HEALTH CARE EDUCATION/TRAINING PROGRAM

## 2023-05-12 PROCEDURE — 85025 COMPLETE CBC W/AUTO DIFF WBC: CPT

## 2023-05-12 PROCEDURE — 2580000003 HC RX 258: Performed by: STUDENT IN AN ORGANIZED HEALTH CARE EDUCATION/TRAINING PROGRAM

## 2023-05-12 RX ORDER — PROMETHAZINE HYDROCHLORIDE 25 MG/1
25 TABLET ORAL EVERY 6 HOURS PRN
Status: DISCONTINUED | OUTPATIENT
Start: 2023-05-12 | End: 2023-05-19

## 2023-05-12 RX ORDER — KETOROLAC TROMETHAMINE 30 MG/ML
30 INJECTION, SOLUTION INTRAMUSCULAR; INTRAVENOUS
Status: ACTIVE | OUTPATIENT
Start: 2023-05-12 | End: 2023-05-13

## 2023-05-12 RX ORDER — ACETAMINOPHEN 500 MG
1000 TABLET ORAL EVERY 8 HOURS SCHEDULED
Status: DISCONTINUED | OUTPATIENT
Start: 2023-05-12 | End: 2023-05-27 | Stop reason: HOSPADM

## 2023-05-12 RX ORDER — OXYCODONE HYDROCHLORIDE 5 MG/1
5 TABLET ORAL EVERY 4 HOURS PRN
Status: DISCONTINUED | OUTPATIENT
Start: 2023-05-12 | End: 2023-05-24

## 2023-05-12 RX ORDER — ALBUMIN (HUMAN) 12.5 G/50ML
25 SOLUTION INTRAVENOUS ONCE
Status: COMPLETED | OUTPATIENT
Start: 2023-05-12 | End: 2023-05-12

## 2023-05-12 RX ORDER — HYDROMORPHONE HYDROCHLORIDE 1 MG/ML
1 INJECTION, SOLUTION INTRAMUSCULAR; INTRAVENOUS; SUBCUTANEOUS EVERY 4 HOURS PRN
Status: DISCONTINUED | OUTPATIENT
Start: 2023-05-12 | End: 2023-05-16

## 2023-05-12 RX ORDER — NALOXONE HYDROCHLORIDE 0.4 MG/ML
0.4 INJECTION, SOLUTION INTRAMUSCULAR; INTRAVENOUS; SUBCUTANEOUS PRN
Status: DISCONTINUED | OUTPATIENT
Start: 2023-05-12 | End: 2023-05-16

## 2023-05-12 RX ORDER — PROCHLORPERAZINE EDISYLATE 5 MG/ML
10 INJECTION INTRAMUSCULAR; INTRAVENOUS EVERY 6 HOURS PRN
Status: DISCONTINUED | OUTPATIENT
Start: 2023-05-12 | End: 2023-05-19

## 2023-05-12 RX ORDER — 0.9 % SODIUM CHLORIDE 0.9 %
250 INTRAVENOUS SOLUTION INTRAVENOUS ONCE
Status: COMPLETED | OUTPATIENT
Start: 2023-05-12 | End: 2023-05-12

## 2023-05-12 RX ORDER — 0.9 % SODIUM CHLORIDE 0.9 %
500 INTRAVENOUS SOLUTION INTRAVENOUS ONCE
Status: DISCONTINUED | OUTPATIENT
Start: 2023-05-12 | End: 2023-05-12

## 2023-05-12 RX ORDER — KETOROLAC TROMETHAMINE 30 MG/ML
30 INJECTION, SOLUTION INTRAMUSCULAR; INTRAVENOUS ONCE
Status: DISCONTINUED | OUTPATIENT
Start: 2023-05-12 | End: 2023-05-12

## 2023-05-12 RX ADMIN — ALBUMIN (HUMAN) 25 G: 0.25 INJECTION, SOLUTION INTRAVENOUS at 14:17

## 2023-05-12 RX ADMIN — OXYCODONE 10 MG: 5 TABLET ORAL at 13:20

## 2023-05-12 RX ADMIN — ACETAMINOPHEN 1000 MG: 500 TABLET ORAL at 21:11

## 2023-05-12 RX ADMIN — Medication 1 AMPULE: at 21:10

## 2023-05-12 RX ADMIN — CALCITRIOL CAPSULES 0.25 MCG 0.5 MCG: 0.25 CAPSULE ORAL at 10:03

## 2023-05-12 RX ADMIN — DOCUSATE SODIUM - SENNOSIDES 2 TABLET: 50; 8.6 TABLET, FILM COATED ORAL at 10:04

## 2023-05-12 RX ADMIN — ACETAMINOPHEN 1000 MG: 500 TABLET ORAL at 13:20

## 2023-05-12 RX ADMIN — DOCUSATE SODIUM - SENNOSIDES 2 TABLET: 50; 8.6 TABLET, FILM COATED ORAL at 04:23

## 2023-05-12 RX ADMIN — PANTOPRAZOLE SODIUM 40 MG: 40 TABLET, DELAYED RELEASE ORAL at 06:11

## 2023-05-12 RX ADMIN — OXYCODONE 10 MG: 5 TABLET ORAL at 04:22

## 2023-05-12 RX ADMIN — PANTOPRAZOLE SODIUM 40 MG: 40 TABLET, DELAYED RELEASE ORAL at 16:48

## 2023-05-12 RX ADMIN — HYDROMORPHONE HYDROCHLORIDE 1 MG: 1 INJECTION, SOLUTION INTRAMUSCULAR; INTRAVENOUS; SUBCUTANEOUS at 23:01

## 2023-05-12 RX ADMIN — PROCHLORPERAZINE EDISYLATE 10 MG: 5 INJECTION, SOLUTION INTRAMUSCULAR; INTRAVENOUS at 09:50

## 2023-05-12 RX ADMIN — ATORVASTATIN CALCIUM 20 MG: 20 TABLET, FILM COATED ORAL at 10:03

## 2023-05-12 RX ADMIN — SERTRALINE 50 MG: 50 TABLET, FILM COATED ORAL at 10:01

## 2023-05-12 RX ADMIN — HYDROMORPHONE HYDROCHLORIDE 1 MG: 1 INJECTION, SOLUTION INTRAMUSCULAR; INTRAVENOUS; SUBCUTANEOUS at 09:50

## 2023-05-12 RX ADMIN — Medication 1 AMPULE: at 10:00

## 2023-05-12 RX ADMIN — OXYCODONE 5 MG: 5 TABLET ORAL at 19:59

## 2023-05-12 RX ADMIN — SODIUM CHLORIDE, PRESERVATIVE FREE 10 ML: 5 INJECTION INTRAVENOUS at 21:10

## 2023-05-12 RX ADMIN — APIXABAN 5 MG: 5 TABLET, FILM COATED ORAL at 21:11

## 2023-05-12 RX ADMIN — DOCUSATE SODIUM - SENNOSIDES 2 TABLET: 50; 8.6 TABLET, FILM COATED ORAL at 21:11

## 2023-05-12 RX ADMIN — ONDANSETRON 4 MG: 2 INJECTION INTRAMUSCULAR; INTRAVENOUS at 04:27

## 2023-05-12 RX ADMIN — SODIUM CHLORIDE 250 ML: 9 INJECTION, SOLUTION INTRAVENOUS at 11:56

## 2023-05-12 RX ADMIN — ASPIRIN 81 MG: 81 TABLET, COATED ORAL at 10:03

## 2023-05-12 ASSESSMENT — PAIN DESCRIPTION - LOCATION
LOCATION: LEG
LOCATION: GENERALIZED
LOCATION: GENERALIZED
LOCATION: LEG;FOOT

## 2023-05-12 ASSESSMENT — PAIN SCALES - GENERAL
PAINLEVEL_OUTOF10: 7
PAINLEVEL_OUTOF10: 0
PAINLEVEL_OUTOF10: 6
PAINLEVEL_OUTOF10: 8
PAINLEVEL_OUTOF10: 7
PAINLEVEL_OUTOF10: 7
PAINLEVEL_OUTOF10: 4
PAINLEVEL_OUTOF10: 6
PAINLEVEL_OUTOF10: 7
PAINLEVEL_OUTOF10: 5
PAINLEVEL_OUTOF10: 5
PAINLEVEL_OUTOF10: 8

## 2023-05-12 ASSESSMENT — PAIN - FUNCTIONAL ASSESSMENT
PAIN_FUNCTIONAL_ASSESSMENT: ACTIVITIES ARE NOT PREVENTED

## 2023-05-12 ASSESSMENT — PAIN DESCRIPTION - DESCRIPTORS
DESCRIPTORS: ACHING
DESCRIPTORS: SHARP;THROBBING;ACHING
DESCRIPTORS: ACHING
DESCRIPTORS: ACHING
DESCRIPTORS: SHARP;ACHING
DESCRIPTORS: ACHING;SHARP
DESCRIPTORS: ACHING;SHARP
DESCRIPTORS: ACHING

## 2023-05-12 ASSESSMENT — PAIN DESCRIPTION - PAIN TYPE
TYPE: ACUTE PAIN

## 2023-05-12 ASSESSMENT — PAIN SCALES - WONG BAKER
WONGBAKER_NUMERICALRESPONSE: 0
WONGBAKER_NUMERICALRESPONSE: 0

## 2023-05-12 ASSESSMENT — PAIN DESCRIPTION - ORIENTATION
ORIENTATION: LEFT

## 2023-05-12 ASSESSMENT — PAIN DESCRIPTION - ONSET
ONSET: OTHER (COMMENT)
ONSET: ON-GOING

## 2023-05-12 ASSESSMENT — PAIN DESCRIPTION - FREQUENCY
FREQUENCY: CONTINUOUS

## 2023-05-13 LAB
ANION GAP SERPL CALC-SCNC: 8 MMOL/L (ref 5–15)
BUN SERPL-MCNC: 47 MG/DL (ref 6–20)
BUN/CREAT SERPL: 6 (ref 12–20)
CALCIUM SERPL-MCNC: 7.6 MG/DL (ref 8.5–10.1)
CHLORIDE SERPL-SCNC: 100 MMOL/L (ref 97–108)
CO2 SERPL-SCNC: 27 MMOL/L (ref 21–32)
CREAT SERPL-MCNC: 8.15 MG/DL (ref 0.7–1.3)
ERYTHROCYTE [DISTWIDTH] IN BLOOD BY AUTOMATED COUNT: 13.2 % (ref 11.5–14.5)
ERYTHROCYTE [DISTWIDTH] IN BLOOD BY AUTOMATED COUNT: 13.3 % (ref 11.5–14.5)
GLUCOSE SERPL-MCNC: 82 MG/DL (ref 65–100)
HCT VFR BLD AUTO: 23.7 % (ref 36.6–50.3)
HCT VFR BLD AUTO: 26.4 % (ref 36.6–50.3)
HGB BLD-MCNC: 7.3 G/DL (ref 12.1–17)
HGB BLD-MCNC: 8.2 G/DL (ref 12.1–17)
MCH RBC QN AUTO: 30.4 PG (ref 26–34)
MCH RBC QN AUTO: 30.4 PG (ref 26–34)
MCHC RBC AUTO-ENTMCNC: 30.8 G/DL (ref 30–36.5)
MCHC RBC AUTO-ENTMCNC: 31.1 G/DL (ref 30–36.5)
MCV RBC AUTO: 97.8 FL (ref 80–99)
MCV RBC AUTO: 98.8 FL (ref 80–99)
NRBC # BLD: 0 K/UL (ref 0–0.01)
NRBC # BLD: 0 K/UL (ref 0–0.01)
NRBC BLD-RTO: 0 PER 100 WBC
NRBC BLD-RTO: 0 PER 100 WBC
PLATELET # BLD AUTO: 122 K/UL (ref 150–400)
PLATELET # BLD AUTO: 128 K/UL (ref 150–400)
PMV BLD AUTO: 10.1 FL (ref 8.9–12.9)
PMV BLD AUTO: 10.7 FL (ref 8.9–12.9)
POTASSIUM SERPL-SCNC: 4.9 MMOL/L (ref 3.5–5.1)
RBC # BLD AUTO: 2.4 M/UL (ref 4.1–5.7)
RBC # BLD AUTO: 2.7 M/UL (ref 4.1–5.7)
SODIUM SERPL-SCNC: 135 MMOL/L (ref 136–145)
WBC # BLD AUTO: 6.7 K/UL (ref 4.1–11.1)
WBC # BLD AUTO: 7.6 K/UL (ref 4.1–11.1)

## 2023-05-13 PROCEDURE — 80048 BASIC METABOLIC PNL TOTAL CA: CPT

## 2023-05-13 PROCEDURE — 6360000002 HC RX W HCPCS: Performed by: NURSE PRACTITIONER

## 2023-05-13 PROCEDURE — P9047 ALBUMIN (HUMAN), 25%, 50ML: HCPCS | Performed by: INTERNAL MEDICINE

## 2023-05-13 PROCEDURE — 6370000000 HC RX 637 (ALT 250 FOR IP): Performed by: NURSE PRACTITIONER

## 2023-05-13 PROCEDURE — 2700000000 HC OXYGEN THERAPY PER DAY

## 2023-05-13 PROCEDURE — 6360000002 HC RX W HCPCS: Performed by: INTERNAL MEDICINE

## 2023-05-13 PROCEDURE — 85027 COMPLETE CBC AUTOMATED: CPT

## 2023-05-13 PROCEDURE — 36415 COLL VENOUS BLD VENIPUNCTURE: CPT

## 2023-05-13 PROCEDURE — 2060000000 HC ICU INTERMEDIATE R&B

## 2023-05-13 PROCEDURE — 6370000000 HC RX 637 (ALT 250 FOR IP): Performed by: SURGERY

## 2023-05-13 PROCEDURE — 2500000003 HC RX 250 WO HCPCS: Performed by: NURSE PRACTITIONER

## 2023-05-13 PROCEDURE — 90935 HEMODIALYSIS ONE EVALUATION: CPT

## 2023-05-13 PROCEDURE — 6360000002 HC RX W HCPCS: Performed by: SURGERY

## 2023-05-13 RX ORDER — ALBUMIN (HUMAN) 12.5 G/50ML
25 SOLUTION INTRAVENOUS PRN
Status: DISCONTINUED | OUTPATIENT
Start: 2023-05-13 | End: 2023-05-16

## 2023-05-13 RX ADMIN — OXYCODONE 5 MG: 5 TABLET ORAL at 16:11

## 2023-05-13 RX ADMIN — DOCUSATE SODIUM - SENNOSIDES 2 TABLET: 50; 8.6 TABLET, FILM COATED ORAL at 21:24

## 2023-05-13 RX ADMIN — ACETAMINOPHEN 1000 MG: 500 TABLET ORAL at 05:46

## 2023-05-13 RX ADMIN — ACETAMINOPHEN 1000 MG: 500 TABLET ORAL at 14:26

## 2023-05-13 RX ADMIN — HYDROMORPHONE HYDROCHLORIDE 1 MG: 1 INJECTION, SOLUTION INTRAMUSCULAR; INTRAVENOUS; SUBCUTANEOUS at 14:36

## 2023-05-13 RX ADMIN — Medication 1 AMPULE: at 09:35

## 2023-05-13 RX ADMIN — SEVELAMER CARBONATE 1600 MG: 800 TABLET, FILM COATED ORAL at 21:25

## 2023-05-13 RX ADMIN — PANTOPRAZOLE SODIUM 40 MG: 40 TABLET, DELAYED RELEASE ORAL at 16:10

## 2023-05-13 RX ADMIN — Medication 1 AMPULE: at 21:28

## 2023-05-13 RX ADMIN — ALBUMIN (HUMAN) 25 G: 0.25 INJECTION, SOLUTION INTRAVENOUS at 10:55

## 2023-05-13 RX ADMIN — APIXABAN 5 MG: 5 TABLET, FILM COATED ORAL at 21:27

## 2023-05-13 RX ADMIN — CINACALCET 60 MG: 30 TABLET, FILM COATED ORAL at 09:27

## 2023-05-13 RX ADMIN — DOCUSATE SODIUM - SENNOSIDES 2 TABLET: 50; 8.6 TABLET, FILM COATED ORAL at 09:28

## 2023-05-13 RX ADMIN — ATORVASTATIN CALCIUM 20 MG: 20 TABLET, FILM COATED ORAL at 09:28

## 2023-05-13 RX ADMIN — ASPIRIN 81 MG: 81 TABLET, COATED ORAL at 09:27

## 2023-05-13 RX ADMIN — PROCHLORPERAZINE EDISYLATE 10 MG: 5 INJECTION, SOLUTION INTRAMUSCULAR; INTRAVENOUS at 14:26

## 2023-05-13 RX ADMIN — OXYCODONE 5 MG: 5 TABLET ORAL at 09:28

## 2023-05-13 RX ADMIN — SERTRALINE 50 MG: 50 TABLET, FILM COATED ORAL at 09:28

## 2023-05-13 RX ADMIN — ACETAMINOPHEN 1000 MG: 500 TABLET ORAL at 21:25

## 2023-05-13 RX ADMIN — EPOETIN ALFA-EPBX 4000 UNITS: 4000 INJECTION, SOLUTION INTRAVENOUS; SUBCUTANEOUS at 21:38

## 2023-05-13 RX ADMIN — SEVELAMER CARBONATE 1600 MG: 800 TABLET, FILM COATED ORAL at 14:28

## 2023-05-13 RX ADMIN — CALCITRIOL CAPSULES 0.25 MCG 0.5 MCG: 0.25 CAPSULE ORAL at 09:27

## 2023-05-13 RX ADMIN — HYDROMORPHONE HYDROCHLORIDE 1 MG: 1 INJECTION, SOLUTION INTRAMUSCULAR; INTRAVENOUS; SUBCUTANEOUS at 23:36

## 2023-05-13 RX ADMIN — PANTOPRAZOLE SODIUM 40 MG: 40 TABLET, DELAYED RELEASE ORAL at 08:40

## 2023-05-13 ASSESSMENT — PAIN SCALES - GENERAL
PAINLEVEL_OUTOF10: 7
PAINLEVEL_OUTOF10: 7
PAINLEVEL_OUTOF10: 4
PAINLEVEL_OUTOF10: 5
PAINLEVEL_OUTOF10: 7
PAINLEVEL_OUTOF10: 0
PAINLEVEL_OUTOF10: 7
PAINLEVEL_OUTOF10: 0
PAINLEVEL_OUTOF10: 6
PAINLEVEL_OUTOF10: 8
PAINLEVEL_OUTOF10: 7
PAINLEVEL_OUTOF10: 0
PAINLEVEL_OUTOF10: 6

## 2023-05-13 ASSESSMENT — PAIN DESCRIPTION - DESCRIPTORS
DESCRIPTORS: ACHING
DESCRIPTORS: THROBBING
DESCRIPTORS: ACHING
DESCRIPTORS: ACHING
DESCRIPTORS: THROBBING
DESCRIPTORS: ACHING
DESCRIPTORS: THROBBING
DESCRIPTORS: ACHING
DESCRIPTORS: THROBBING

## 2023-05-13 ASSESSMENT — PAIN DESCRIPTION - PAIN TYPE
TYPE: ACUTE PAIN

## 2023-05-13 ASSESSMENT — PAIN DESCRIPTION - LOCATION
LOCATION: LEG
LOCATION: FOOT;LEG
LOCATION: LEG
LOCATION: LEG
LOCATION: FOOT;LEG
LOCATION: LEG;FOOT
LOCATION: FOOT
LOCATION: LEG

## 2023-05-13 ASSESSMENT — PAIN DESCRIPTION - ORIENTATION
ORIENTATION: LEFT;ANTERIOR
ORIENTATION: LEFT;ANTERIOR
ORIENTATION: LEFT
ORIENTATION: LEFT;LOWER
ORIENTATION: LEFT

## 2023-05-13 ASSESSMENT — PAIN DESCRIPTION - FREQUENCY
FREQUENCY: CONTINUOUS

## 2023-05-14 LAB
ANION GAP SERPL CALC-SCNC: 5 MMOL/L (ref 5–15)
BUN SERPL-MCNC: 22 MG/DL (ref 6–20)
BUN/CREAT SERPL: 4 (ref 12–20)
CALCIUM SERPL-MCNC: 7 MG/DL (ref 8.5–10.1)
CHLORIDE SERPL-SCNC: 96 MMOL/L (ref 97–108)
CO2 SERPL-SCNC: 34 MMOL/L (ref 21–32)
CREAT SERPL-MCNC: 5.42 MG/DL (ref 0.7–1.3)
ERYTHROCYTE [DISTWIDTH] IN BLOOD BY AUTOMATED COUNT: 13.2 % (ref 11.5–14.5)
GLUCOSE SERPL-MCNC: 95 MG/DL (ref 65–100)
HCT VFR BLD AUTO: 25.2 % (ref 36.6–50.3)
HGB BLD-MCNC: 7.8 G/DL (ref 12.1–17)
MCH RBC QN AUTO: 30.5 PG (ref 26–34)
MCHC RBC AUTO-ENTMCNC: 31 G/DL (ref 30–36.5)
MCV RBC AUTO: 98.4 FL (ref 80–99)
NRBC # BLD: 0 K/UL (ref 0–0.01)
NRBC BLD-RTO: 0 PER 100 WBC
PLATELET # BLD AUTO: 140 K/UL (ref 150–400)
PMV BLD AUTO: 10.6 FL (ref 8.9–12.9)
POTASSIUM SERPL-SCNC: 4.6 MMOL/L (ref 3.5–5.1)
RBC # BLD AUTO: 2.56 M/UL (ref 4.1–5.7)
SODIUM SERPL-SCNC: 135 MMOL/L (ref 136–145)
WBC # BLD AUTO: 8.3 K/UL (ref 4.1–11.1)

## 2023-05-14 PROCEDURE — 2060000000 HC ICU INTERMEDIATE R&B

## 2023-05-14 PROCEDURE — 6370000000 HC RX 637 (ALT 250 FOR IP): Performed by: SURGERY

## 2023-05-14 PROCEDURE — 85610 PROTHROMBIN TIME: CPT

## 2023-05-14 PROCEDURE — 2500000003 HC RX 250 WO HCPCS: Performed by: NURSE PRACTITIONER

## 2023-05-14 PROCEDURE — 85025 COMPLETE CBC W/AUTO DIFF WBC: CPT

## 2023-05-14 PROCEDURE — 6360000002 HC RX W HCPCS: Performed by: NURSE PRACTITIONER

## 2023-05-14 PROCEDURE — 36415 COLL VENOUS BLD VENIPUNCTURE: CPT

## 2023-05-14 PROCEDURE — 6370000000 HC RX 637 (ALT 250 FOR IP): Performed by: NURSE PRACTITIONER

## 2023-05-14 PROCEDURE — 85027 COMPLETE CBC AUTOMATED: CPT

## 2023-05-14 PROCEDURE — 80048 BASIC METABOLIC PNL TOTAL CA: CPT

## 2023-05-14 RX ADMIN — Medication 1 AMPULE: at 20:40

## 2023-05-14 RX ADMIN — ASPIRIN 81 MG: 81 TABLET, COATED ORAL at 10:20

## 2023-05-14 RX ADMIN — APIXABAN 5 MG: 5 TABLET, FILM COATED ORAL at 09:00

## 2023-05-14 RX ADMIN — PROCHLORPERAZINE EDISYLATE 10 MG: 5 INJECTION, SOLUTION INTRAMUSCULAR; INTRAVENOUS at 11:58

## 2023-05-14 RX ADMIN — ACETAMINOPHEN 1000 MG: 500 TABLET ORAL at 15:17

## 2023-05-14 RX ADMIN — DOCUSATE SODIUM - SENNOSIDES 2 TABLET: 50; 8.6 TABLET, FILM COATED ORAL at 10:18

## 2023-05-14 RX ADMIN — SEVELAMER CARBONATE 1600 MG: 800 TABLET, FILM COATED ORAL at 10:21

## 2023-05-14 RX ADMIN — OXYCODONE 5 MG: 5 TABLET ORAL at 15:11

## 2023-05-14 RX ADMIN — PANTOPRAZOLE SODIUM 40 MG: 40 TABLET, DELAYED RELEASE ORAL at 08:16

## 2023-05-14 RX ADMIN — DOCUSATE SODIUM - SENNOSIDES 2 TABLET: 50; 8.6 TABLET, FILM COATED ORAL at 20:36

## 2023-05-14 RX ADMIN — PANTOPRAZOLE SODIUM 40 MG: 40 TABLET, DELAYED RELEASE ORAL at 15:17

## 2023-05-14 RX ADMIN — OXYCODONE 10 MG: 5 TABLET ORAL at 23:42

## 2023-05-14 RX ADMIN — ATORVASTATIN CALCIUM 20 MG: 20 TABLET, FILM COATED ORAL at 10:19

## 2023-05-14 RX ADMIN — APIXABAN 5 MG: 5 TABLET, FILM COATED ORAL at 20:36

## 2023-05-14 RX ADMIN — HYDROMORPHONE HYDROCHLORIDE 1 MG: 1 INJECTION, SOLUTION INTRAMUSCULAR; INTRAVENOUS; SUBCUTANEOUS at 16:28

## 2023-05-14 RX ADMIN — OXYCODONE 10 MG: 5 TABLET ORAL at 20:35

## 2023-05-14 RX ADMIN — CINACALCET 60 MG: 30 TABLET, FILM COATED ORAL at 10:19

## 2023-05-14 RX ADMIN — CALCITRIOL CAPSULES 0.25 MCG 0.5 MCG: 0.25 CAPSULE ORAL at 10:19

## 2023-05-14 RX ADMIN — ACETAMINOPHEN 1000 MG: 500 TABLET ORAL at 05:11

## 2023-05-14 RX ADMIN — OXYCODONE 10 MG: 5 TABLET ORAL at 04:36

## 2023-05-14 RX ADMIN — SERTRALINE 50 MG: 50 TABLET, FILM COATED ORAL at 10:19

## 2023-05-14 RX ADMIN — HYDROMORPHONE HYDROCHLORIDE 1 MG: 1 INJECTION, SOLUTION INTRAMUSCULAR; INTRAVENOUS; SUBCUTANEOUS at 10:20

## 2023-05-14 RX ADMIN — PROCHLORPERAZINE EDISYLATE 10 MG: 5 INJECTION, SOLUTION INTRAMUSCULAR; INTRAVENOUS at 20:42

## 2023-05-14 RX ADMIN — ACETAMINOPHEN 1000 MG: 500 TABLET ORAL at 21:52

## 2023-05-14 RX ADMIN — Medication 1 AMPULE: at 10:21

## 2023-05-14 ASSESSMENT — PAIN DESCRIPTION - DESCRIPTORS
DESCRIPTORS: THROBBING
DESCRIPTORS: ACHING
DESCRIPTORS: THROBBING
DESCRIPTORS: THROBBING
DESCRIPTORS: ACHING;SORE
DESCRIPTORS: ACHING;SORE
DESCRIPTORS: THROBBING
DESCRIPTORS: THROBBING
DESCRIPTORS: ACHING
DESCRIPTORS: ACHING

## 2023-05-14 ASSESSMENT — PAIN DESCRIPTION - LOCATION
LOCATION: LEG
LOCATION: FOOT
LOCATION: FOOT
LOCATION: LEG

## 2023-05-14 ASSESSMENT — PAIN DESCRIPTION - ORIENTATION
ORIENTATION: LEFT
ORIENTATION: LEFT;ANTERIOR
ORIENTATION: LEFT
ORIENTATION: LEFT;ANTERIOR
ORIENTATION: LEFT

## 2023-05-14 ASSESSMENT — PAIN SCALES - GENERAL
PAINLEVEL_OUTOF10: 7
PAINLEVEL_OUTOF10: 8
PAINLEVEL_OUTOF10: 5
PAINLEVEL_OUTOF10: 7
PAINLEVEL_OUTOF10: 5
PAINLEVEL_OUTOF10: 0
PAINLEVEL_OUTOF10: 2
PAINLEVEL_OUTOF10: 8
PAINLEVEL_OUTOF10: 0
PAINLEVEL_OUTOF10: 7
PAINLEVEL_OUTOF10: 6
PAINLEVEL_OUTOF10: 2
PAINLEVEL_OUTOF10: 3

## 2023-05-14 ASSESSMENT — PAIN DESCRIPTION - PAIN TYPE: TYPE: ACUTE PAIN

## 2023-05-15 ENCOUNTER — APPOINTMENT (OUTPATIENT)
Facility: HOSPITAL | Age: 48
DRG: 270 | End: 2023-05-15
Payer: MEDICARE

## 2023-05-15 LAB
BASOPHILS # BLD: 0 K/UL (ref 0–0.1)
BASOPHILS NFR BLD: 1 % (ref 0–1)
DIFFERENTIAL METHOD BLD: ABNORMAL
EOSINOPHIL # BLD: 0.2 K/UL (ref 0–0.4)
EOSINOPHIL NFR BLD: 3 % (ref 0–7)
ERYTHROCYTE [DISTWIDTH] IN BLOOD BY AUTOMATED COUNT: 13.4 % (ref 11.5–14.5)
HCT VFR BLD AUTO: 25.8 % (ref 36.6–50.3)
HGB BLD-MCNC: 8.2 G/DL (ref 12.1–17)
IMM GRANULOCYTES # BLD AUTO: 0 K/UL (ref 0–0.04)
IMM GRANULOCYTES NFR BLD AUTO: 0 % (ref 0–0.5)
INR PPP: 1.4 (ref 0.9–1.1)
LYMPHOCYTES # BLD: 1.5 K/UL (ref 0.8–3.5)
LYMPHOCYTES NFR BLD: 18 % (ref 12–49)
MCH RBC QN AUTO: 31.1 PG (ref 26–34)
MCHC RBC AUTO-ENTMCNC: 31.8 G/DL (ref 30–36.5)
MCV RBC AUTO: 97.7 FL (ref 80–99)
MONOCYTES # BLD: 0.9 K/UL (ref 0–1)
MONOCYTES NFR BLD: 11 % (ref 5–13)
NEUTS SEG # BLD: 5.6 K/UL (ref 1.8–8)
NEUTS SEG NFR BLD: 67 % (ref 32–75)
NRBC # BLD: 0 K/UL (ref 0–0.01)
NRBC BLD-RTO: 0 PER 100 WBC
PLATELET # BLD AUTO: 166 K/UL (ref 150–400)
PMV BLD AUTO: 11.2 FL (ref 8.9–12.9)
PROTHROMBIN TIME: 14.5 SEC (ref 9–11.1)
RBC # BLD AUTO: 2.64 M/UL (ref 4.1–5.7)
WBC # BLD AUTO: 8.3 K/UL (ref 4.1–11.1)

## 2023-05-15 PROCEDURE — 6370000000 HC RX 637 (ALT 250 FOR IP): Performed by: NURSE PRACTITIONER

## 2023-05-15 PROCEDURE — 6370000000 HC RX 637 (ALT 250 FOR IP): Performed by: SURGERY

## 2023-05-15 PROCEDURE — 2500000003 HC RX 250 WO HCPCS: Performed by: NURSE PRACTITIONER

## 2023-05-15 PROCEDURE — 93926 LOWER EXTREMITY STUDY: CPT

## 2023-05-15 PROCEDURE — 6360000002 HC RX W HCPCS: Performed by: NURSE PRACTITIONER

## 2023-05-15 PROCEDURE — 2060000000 HC ICU INTERMEDIATE R&B

## 2023-05-15 RX ADMIN — PROCHLORPERAZINE EDISYLATE 10 MG: 5 INJECTION, SOLUTION INTRAMUSCULAR; INTRAVENOUS at 17:03

## 2023-05-15 RX ADMIN — Medication 1 AMPULE: at 21:12

## 2023-05-15 RX ADMIN — Medication 1 AMPULE: at 08:32

## 2023-05-15 RX ADMIN — ATORVASTATIN CALCIUM 20 MG: 20 TABLET, FILM COATED ORAL at 08:30

## 2023-05-15 RX ADMIN — PROCHLORPERAZINE EDISYLATE 10 MG: 5 INJECTION, SOLUTION INTRAMUSCULAR; INTRAVENOUS at 08:25

## 2023-05-15 RX ADMIN — HYDROMORPHONE HYDROCHLORIDE 1 MG: 1 INJECTION, SOLUTION INTRAMUSCULAR; INTRAVENOUS; SUBCUTANEOUS at 01:11

## 2023-05-15 RX ADMIN — ACETAMINOPHEN 1000 MG: 500 TABLET ORAL at 05:18

## 2023-05-15 RX ADMIN — APIXABAN 5 MG: 5 TABLET, FILM COATED ORAL at 08:31

## 2023-05-15 RX ADMIN — ASPIRIN 81 MG: 81 TABLET, COATED ORAL at 08:31

## 2023-05-15 RX ADMIN — OXYCODONE 10 MG: 5 TABLET ORAL at 16:44

## 2023-05-15 RX ADMIN — DOCUSATE SODIUM - SENNOSIDES 2 TABLET: 50; 8.6 TABLET, FILM COATED ORAL at 21:12

## 2023-05-15 RX ADMIN — CALCITRIOL CAPSULES 0.25 MCG 0.5 MCG: 0.25 CAPSULE ORAL at 08:32

## 2023-05-15 RX ADMIN — METOPROLOL SUCCINATE 12.5 MG: 25 TABLET, EXTENDED RELEASE ORAL at 08:30

## 2023-05-15 RX ADMIN — OXYCODONE 10 MG: 5 TABLET ORAL at 08:30

## 2023-05-15 RX ADMIN — CINACALCET 60 MG: 30 TABLET, FILM COATED ORAL at 08:30

## 2023-05-15 RX ADMIN — SERTRALINE 50 MG: 50 TABLET, FILM COATED ORAL at 08:30

## 2023-05-15 RX ADMIN — HYDROMORPHONE HYDROCHLORIDE 1 MG: 1 INJECTION, SOLUTION INTRAMUSCULAR; INTRAVENOUS; SUBCUTANEOUS at 18:04

## 2023-05-15 RX ADMIN — ACETAMINOPHEN 1000 MG: 500 TABLET ORAL at 21:12

## 2023-05-15 RX ADMIN — ACETAMINOPHEN 1000 MG: 500 TABLET ORAL at 13:12

## 2023-05-15 RX ADMIN — HYDROMORPHONE HYDROCHLORIDE 1 MG: 1 INJECTION, SOLUTION INTRAMUSCULAR; INTRAVENOUS; SUBCUTANEOUS at 22:09

## 2023-05-15 RX ADMIN — PANTOPRAZOLE SODIUM 40 MG: 40 TABLET, DELAYED RELEASE ORAL at 15:21

## 2023-05-15 RX ADMIN — OXYCODONE 10 MG: 5 TABLET ORAL at 12:40

## 2023-05-15 RX ADMIN — PANTOPRAZOLE SODIUM 40 MG: 40 TABLET, DELAYED RELEASE ORAL at 08:31

## 2023-05-15 RX ADMIN — DOCUSATE SODIUM - SENNOSIDES 2 TABLET: 50; 8.6 TABLET, FILM COATED ORAL at 08:31

## 2023-05-15 ASSESSMENT — PAIN DESCRIPTION - LOCATION
LOCATION: LEG;FOOT
LOCATION: FOOT
LOCATION: LEG;FOOT

## 2023-05-15 ASSESSMENT — PAIN SCALES - GENERAL
PAINLEVEL_OUTOF10: 7
PAINLEVEL_OUTOF10: 0
PAINLEVEL_OUTOF10: 8
PAINLEVEL_OUTOF10: 5
PAINLEVEL_OUTOF10: 7

## 2023-05-15 ASSESSMENT — PAIN DESCRIPTION - DESCRIPTORS
DESCRIPTORS: ACHING;SHARP;THROBBING
DESCRIPTORS: ACHING;SORE
DESCRIPTORS: ACHING;THROBBING

## 2023-05-15 ASSESSMENT — PAIN DESCRIPTION - ORIENTATION
ORIENTATION: LEFT

## 2023-05-16 LAB
ALBUMIN SERPL-MCNC: 3.5 G/DL (ref 3.5–5)
ANION GAP SERPL CALC-SCNC: 7 MMOL/L (ref 5–15)
BUN SERPL-MCNC: 46 MG/DL (ref 6–20)
BUN/CREAT SERPL: 5 (ref 12–20)
CALCIUM SERPL-MCNC: 6.9 MG/DL (ref 8.5–10.1)
CHLORIDE SERPL-SCNC: 90 MMOL/L (ref 97–108)
CO2 SERPL-SCNC: 31 MMOL/L (ref 21–32)
CREAT SERPL-MCNC: 9.13 MG/DL (ref 0.7–1.3)
ECHO BSA: 1.67 M2
ERYTHROCYTE [DISTWIDTH] IN BLOOD BY AUTOMATED COUNT: 13.5 % (ref 11.5–14.5)
GLUCOSE SERPL-MCNC: 88 MG/DL (ref 65–100)
HCT VFR BLD AUTO: 26.1 % (ref 36.6–50.3)
HGB BLD-MCNC: 8.4 G/DL (ref 12.1–17)
MCH RBC QN AUTO: 31.1 PG (ref 26–34)
MCHC RBC AUTO-ENTMCNC: 32.2 G/DL (ref 30–36.5)
MCV RBC AUTO: 96.7 FL (ref 80–99)
NRBC # BLD: 0 K/UL (ref 0–0.01)
NRBC BLD-RTO: 0 PER 100 WBC
PHOSPHATE SERPL-MCNC: 4.1 MG/DL (ref 2.6–4.7)
PLATELET # BLD AUTO: 192 K/UL (ref 150–400)
PMV BLD AUTO: 10.6 FL (ref 8.9–12.9)
POTASSIUM SERPL-SCNC: 6.2 MMOL/L (ref 3.5–5.1)
RBC # BLD AUTO: 2.7 M/UL (ref 4.1–5.7)
SODIUM SERPL-SCNC: 128 MMOL/L (ref 136–145)
VAS LEFT ABI: 0
VAS LEFT ARTERIAL PROX ANASTOMOSIS EDV: 0 CM/S
VAS LEFT ARTERIAL PROX ANASTOMOSIS EDV: 0 CM/S
VAS LEFT ARTERIAL PROX ANASTOMOSIS PSV: 0 CM/S
VAS LEFT ARTERIAL PROX ANASTOMOSIS PSV: 70.9 CM/S
VAS LEFT CFA PROX PSV: 103.8 CM/S
VAS LEFT DIST OUTFLOW EDV: 0 CM/S
VAS LEFT DIST OUTFLOW EDV: 0 CM/S
VAS LEFT DIST OUTFLOW PSV: 0 CM/S
VAS LEFT DIST OUTFLOW PSV: 0 CM/S
VAS LEFT DORSALIS PEDIS BP: 0 MMHG
VAS LEFT INFLOW ARTERY EDV: 10.1 CM/S
VAS LEFT INFLOW ARTERY EDV: 9 CM/S
VAS LEFT INFLOW ARTERY PSV: 103.8 CM/S
VAS LEFT INFLOW ARTERY PSV: 69 CM/S
VAS LEFT MID OUTFLOW EDV: 0 CM/S
VAS LEFT MID OUTFLOW EDV: 0 CM/S
VAS LEFT MID OUTFLOW PSV: 0 CM/S
VAS LEFT MID OUTFLOW PSV: 0 CM/S
VAS LEFT OUTFLOW VESSEL EDV: 0 CM/S
VAS LEFT OUTFLOW VESSEL EDV: 0 CM/S
VAS LEFT OUTFLOW VESSEL PSV: 0 CM/S
VAS LEFT OUTFLOW VESSEL PSV: 0 CM/S
VAS LEFT PROX OUTFLOW EDV: 0 CM/S
VAS LEFT PROX OUTFLOW EDV: 0 CM/S
VAS LEFT PROX OUTFLOW PSV: 0 CM/S
VAS LEFT PROX OUTFLOW PSV: 0 CM/S
VAS LEFT PTA BP: 0 MMHG
VAS LEFT PTA DIST PSV: 0 CM/S
VAS LEFT SFA DIST PSV: 38.4 CM/S
VAS LEFT SFA PROX PSV: 71.1 CM/S
VAS LEFT SFA PROX VEL RATIO: 0.68
VAS LEFT TBI: 0
VAS LEFT TOE PRESSURE: 0 MMHG
VAS LEFT VENOUS DIST ANASTOMOSIS EDV: 0 CM/S
VAS LEFT VENOUS DIST ANASTOMOSIS PSV: 0 CM/S
VAS RIGHT ARM BP: 159 MMHG
VAS RIGHT DORSALIS PEDIS BP: >240 MMHG
VAS RIGHT PTA BP: >240 MMHG
VAS RIGHT TBI: 0.55
VAS RIGHT TOE PRESSURE: 88 MMHG
WBC # BLD AUTO: 10.6 K/UL (ref 4.1–11.1)

## 2023-05-16 PROCEDURE — 6370000000 HC RX 637 (ALT 250 FOR IP): Performed by: NURSE PRACTITIONER

## 2023-05-16 PROCEDURE — 6370000000 HC RX 637 (ALT 250 FOR IP): Performed by: SURGERY

## 2023-05-16 PROCEDURE — 6360000002 HC RX W HCPCS: Performed by: SURGERY

## 2023-05-16 PROCEDURE — 2500000003 HC RX 250 WO HCPCS: Performed by: NURSE PRACTITIONER

## 2023-05-16 PROCEDURE — 2060000000 HC ICU INTERMEDIATE R&B

## 2023-05-16 PROCEDURE — 36415 COLL VENOUS BLD VENIPUNCTURE: CPT

## 2023-05-16 PROCEDURE — 90935 HEMODIALYSIS ONE EVALUATION: CPT

## 2023-05-16 PROCEDURE — 6360000002 HC RX W HCPCS: Performed by: NURSE PRACTITIONER

## 2023-05-16 PROCEDURE — 2580000003 HC RX 258: Performed by: NURSE PRACTITIONER

## 2023-05-16 PROCEDURE — 85027 COMPLETE CBC AUTOMATED: CPT

## 2023-05-16 PROCEDURE — 80069 RENAL FUNCTION PANEL: CPT

## 2023-05-16 RX ORDER — SODIUM CHLORIDE 9 MG/ML
INJECTION, SOLUTION INTRAVENOUS CONTINUOUS
Status: DISCONTINUED | OUTPATIENT
Start: 2023-05-16 | End: 2023-05-17

## 2023-05-16 RX ORDER — POLYETHYLENE GLYCOL 3350 17 G/17G
17 POWDER, FOR SOLUTION ORAL DAILY PRN
Status: DISCONTINUED | OUTPATIENT
Start: 2023-05-16 | End: 2023-05-19

## 2023-05-16 RX ORDER — NALOXONE HYDROCHLORIDE 0.4 MG/ML
INJECTION, SOLUTION INTRAMUSCULAR; INTRAVENOUS; SUBCUTANEOUS PRN
Status: DISCONTINUED | OUTPATIENT
Start: 2023-05-16 | End: 2023-05-19

## 2023-05-16 RX ADMIN — OXYCODONE 10 MG: 5 TABLET ORAL at 16:12

## 2023-05-16 RX ADMIN — HYDROMORPHONE HYDROCHLORIDE 1 MG: 1 INJECTION, SOLUTION INTRAMUSCULAR; INTRAVENOUS; SUBCUTANEOUS at 07:24

## 2023-05-16 RX ADMIN — ATORVASTATIN CALCIUM 20 MG: 20 TABLET, FILM COATED ORAL at 08:28

## 2023-05-16 RX ADMIN — SERTRALINE 50 MG: 50 TABLET, FILM COATED ORAL at 08:28

## 2023-05-16 RX ADMIN — ONDANSETRON 4 MG: 2 INJECTION INTRAMUSCULAR; INTRAVENOUS at 05:06

## 2023-05-16 RX ADMIN — PANTOPRAZOLE SODIUM 40 MG: 40 TABLET, DELAYED RELEASE ORAL at 16:12

## 2023-05-16 RX ADMIN — DOCUSATE SODIUM - SENNOSIDES 2 TABLET: 50; 8.6 TABLET, FILM COATED ORAL at 20:41

## 2023-05-16 RX ADMIN — HYDROMORPHONE HYDROCHLORIDE 1 MG: 1 INJECTION, SOLUTION INTRAMUSCULAR; INTRAVENOUS; SUBCUTANEOUS at 03:45

## 2023-05-16 RX ADMIN — Medication 1 AMPULE: at 20:41

## 2023-05-16 RX ADMIN — DOCUSATE SODIUM - SENNOSIDES 2 TABLET: 50; 8.6 TABLET, FILM COATED ORAL at 08:28

## 2023-05-16 RX ADMIN — Medication: at 09:55

## 2023-05-16 RX ADMIN — Medication 1 AMPULE: at 08:27

## 2023-05-16 RX ADMIN — OXYCODONE 10 MG: 5 TABLET ORAL at 05:58

## 2023-05-16 RX ADMIN — METOPROLOL SUCCINATE 12.5 MG: 25 TABLET, EXTENDED RELEASE ORAL at 08:27

## 2023-05-16 RX ADMIN — CALCITRIOL CAPSULES 0.25 MCG 0.5 MCG: 0.25 CAPSULE ORAL at 08:28

## 2023-05-16 RX ADMIN — CINACALCET 60 MG: 30 TABLET, FILM COATED ORAL at 08:28

## 2023-05-16 RX ADMIN — OXYCODONE 10 MG: 5 TABLET ORAL at 10:10

## 2023-05-16 RX ADMIN — PROCHLORPERAZINE EDISYLATE 10 MG: 5 INJECTION, SOLUTION INTRAMUSCULAR; INTRAVENOUS at 08:24

## 2023-05-16 RX ADMIN — SODIUM CHLORIDE: 9 INJECTION, SOLUTION INTRAVENOUS at 09:53

## 2023-05-16 RX ADMIN — OXYCODONE 10 MG: 5 TABLET ORAL at 20:40

## 2023-05-16 RX ADMIN — EPOETIN ALFA-EPBX 4000 UNITS: 4000 INJECTION, SOLUTION INTRAVENOUS; SUBCUTANEOUS at 21:11

## 2023-05-16 RX ADMIN — ACETAMINOPHEN 1000 MG: 500 TABLET ORAL at 05:02

## 2023-05-16 RX ADMIN — ASPIRIN 81 MG: 81 TABLET, COATED ORAL at 08:28

## 2023-05-16 RX ADMIN — OXYCODONE 10 MG: 5 TABLET ORAL at 01:10

## 2023-05-16 RX ADMIN — PANTOPRAZOLE SODIUM 40 MG: 40 TABLET, DELAYED RELEASE ORAL at 07:21

## 2023-05-16 ASSESSMENT — PAIN SCALES - GENERAL
PAINLEVEL_OUTOF10: 8
PAINLEVEL_OUTOF10: 4
PAINLEVEL_OUTOF10: 8
PAINLEVEL_OUTOF10: 8
PAINLEVEL_OUTOF10: 7
PAINLEVEL_OUTOF10: 8
PAINLEVEL_OUTOF10: 8
PAINLEVEL_OUTOF10: 6
PAINLEVEL_OUTOF10: 5
PAINLEVEL_OUTOF10: 5
PAINLEVEL_OUTOF10: 8

## 2023-05-16 ASSESSMENT — PAIN DESCRIPTION - ORIENTATION
ORIENTATION: LEFT

## 2023-05-16 ASSESSMENT — PAIN DESCRIPTION - LOCATION
LOCATION: LEG;FOOT
LOCATION: LEG;FOOT
LOCATION: FOOT;LEG
LOCATION: LEG
LOCATION: FOOT;LEG
LOCATION: LEG;FOOT

## 2023-05-16 ASSESSMENT — PAIN DESCRIPTION - DESCRIPTORS
DESCRIPTORS: ACHING;SHARP;THROBBING
DESCRIPTORS: ACHING;THROBBING
DESCRIPTORS: SHARP;ACHING
DESCRIPTORS: ACHING;STABBING;SHARP
DESCRIPTORS: ACHING;THROBBING;SHARP
DESCRIPTORS: ACHING;THROBBING;SHARP
DESCRIPTORS: ACHING;THROBBING
DESCRIPTORS: ACHING;THROBBING;SHARP

## 2023-05-16 ASSESSMENT — PAIN DESCRIPTION - PAIN TYPE: TYPE: ACUTE PAIN

## 2023-05-16 ASSESSMENT — PAIN DESCRIPTION - FREQUENCY: FREQUENCY: CONTINUOUS

## 2023-05-16 ASSESSMENT — PAIN DESCRIPTION - ONSET: ONSET: ON-GOING

## 2023-05-17 ENCOUNTER — ANESTHESIA EVENT (OUTPATIENT)
Facility: HOSPITAL | Age: 48
End: 2023-05-17
Payer: MEDICARE

## 2023-05-17 LAB
ALBUMIN SERPL-MCNC: 3.3 G/DL (ref 3.5–5)
ANION GAP SERPL CALC-SCNC: 4 MMOL/L (ref 5–15)
BUN SERPL-MCNC: 18 MG/DL (ref 6–20)
BUN/CREAT SERPL: 4 (ref 12–20)
CALCIUM SERPL-MCNC: 6.5 MG/DL (ref 8.5–10.1)
CHLORIDE SERPL-SCNC: 98 MMOL/L (ref 97–108)
CO2 SERPL-SCNC: 33 MMOL/L (ref 21–32)
CREAT SERPL-MCNC: 4.97 MG/DL (ref 0.7–1.3)
GLUCOSE SERPL-MCNC: 81 MG/DL (ref 65–100)
PHOSPHATE SERPL-MCNC: 3.2 MG/DL (ref 2.6–4.7)
POTASSIUM SERPL-SCNC: 5.1 MMOL/L (ref 3.5–5.1)
SODIUM SERPL-SCNC: 135 MMOL/L (ref 136–145)

## 2023-05-17 PROCEDURE — 6370000000 HC RX 637 (ALT 250 FOR IP): Performed by: SURGERY

## 2023-05-17 PROCEDURE — 36415 COLL VENOUS BLD VENIPUNCTURE: CPT

## 2023-05-17 PROCEDURE — 2060000000 HC ICU INTERMEDIATE R&B

## 2023-05-17 PROCEDURE — 80069 RENAL FUNCTION PANEL: CPT

## 2023-05-17 PROCEDURE — 90935 HEMODIALYSIS ONE EVALUATION: CPT

## 2023-05-17 PROCEDURE — 6360000002 HC RX W HCPCS: Performed by: ANESTHESIOLOGY

## 2023-05-17 PROCEDURE — 6360000002 HC RX W HCPCS: Performed by: NURSE PRACTITIONER

## 2023-05-17 RX ADMIN — Medication: at 19:11

## 2023-05-17 RX ADMIN — PANTOPRAZOLE SODIUM 40 MG: 40 TABLET, DELAYED RELEASE ORAL at 07:50

## 2023-05-17 RX ADMIN — SERTRALINE 50 MG: 50 TABLET, FILM COATED ORAL at 08:10

## 2023-05-17 RX ADMIN — Medication 1 AMPULE: at 08:07

## 2023-05-17 RX ADMIN — OXYCODONE 10 MG: 5 TABLET ORAL at 12:46

## 2023-05-17 RX ADMIN — OXYCODONE 10 MG: 5 TABLET ORAL at 20:36

## 2023-05-17 RX ADMIN — ATORVASTATIN CALCIUM 20 MG: 20 TABLET, FILM COATED ORAL at 08:11

## 2023-05-17 RX ADMIN — Medication 1 AMPULE: at 20:36

## 2023-05-17 RX ADMIN — METOPROLOL SUCCINATE 12.5 MG: 25 TABLET, EXTENDED RELEASE ORAL at 08:10

## 2023-05-17 RX ADMIN — CALCITRIOL CAPSULES 0.25 MCG 0.5 MCG: 0.25 CAPSULE ORAL at 08:10

## 2023-05-17 RX ADMIN — OXYCODONE 10 MG: 5 TABLET ORAL at 07:50

## 2023-05-17 RX ADMIN — OXYCODONE 10 MG: 5 TABLET ORAL at 16:38

## 2023-05-17 RX ADMIN — DOCUSATE SODIUM - SENNOSIDES 2 TABLET: 50; 8.6 TABLET, FILM COATED ORAL at 08:10

## 2023-05-17 RX ADMIN — DOCUSATE SODIUM - SENNOSIDES 2 TABLET: 50; 8.6 TABLET, FILM COATED ORAL at 20:38

## 2023-05-17 RX ADMIN — PROCHLORPERAZINE EDISYLATE 10 MG: 5 INJECTION, SOLUTION INTRAMUSCULAR; INTRAVENOUS at 08:07

## 2023-05-17 RX ADMIN — PANTOPRAZOLE SODIUM 40 MG: 40 TABLET, DELAYED RELEASE ORAL at 16:39

## 2023-05-17 RX ADMIN — CINACALCET 60 MG: 30 TABLET, FILM COATED ORAL at 08:10

## 2023-05-17 RX ADMIN — OXYCODONE 10 MG: 5 TABLET ORAL at 00:42

## 2023-05-17 RX ADMIN — ASPIRIN 81 MG: 81 TABLET, COATED ORAL at 08:11

## 2023-05-17 RX ADMIN — SEVELAMER CARBONATE 1600 MG: 800 TABLET, FILM COATED ORAL at 12:46

## 2023-05-17 ASSESSMENT — PAIN SCALES - GENERAL
PAINLEVEL_OUTOF10: 7
PAINLEVEL_OUTOF10: 0
PAINLEVEL_OUTOF10: 6
PAINLEVEL_OUTOF10: 5
PAINLEVEL_OUTOF10: 2
PAINLEVEL_OUTOF10: 5
PAINLEVEL_OUTOF10: 7
PAINLEVEL_OUTOF10: 0
PAINLEVEL_OUTOF10: 5
PAINLEVEL_OUTOF10: 8
PAINLEVEL_OUTOF10: 6
PAINLEVEL_OUTOF10: 8
PAINLEVEL_OUTOF10: 8
PAINLEVEL_OUTOF10: 7

## 2023-05-17 ASSESSMENT — PAIN DESCRIPTION - DESCRIPTORS
DESCRIPTORS: ACHING;SHOOTING;SORE;STABBING
DESCRIPTORS: BURNING;STABBING
DESCRIPTORS: ACHING;THROBBING;SHARP
DESCRIPTORS: ACHING;SHOOTING

## 2023-05-17 ASSESSMENT — PAIN DESCRIPTION - ORIENTATION
ORIENTATION: LEFT

## 2023-05-17 ASSESSMENT — PAIN DESCRIPTION - ONSET
ONSET: ON-GOING
ONSET: OTHER (COMMENT)
ONSET: ON-GOING

## 2023-05-17 ASSESSMENT — PAIN DESCRIPTION - FREQUENCY
FREQUENCY: CONTINUOUS
FREQUENCY: CONTINUOUS

## 2023-05-17 ASSESSMENT — PAIN DESCRIPTION - PAIN TYPE
TYPE: ACUTE PAIN

## 2023-05-17 ASSESSMENT — PAIN - FUNCTIONAL ASSESSMENT
PAIN_FUNCTIONAL_ASSESSMENT: PREVENTS OR INTERFERES SOME ACTIVE ACTIVITIES AND ADLS
PAIN_FUNCTIONAL_ASSESSMENT: PREVENTS OR INTERFERES SOME ACTIVE ACTIVITIES AND ADLS

## 2023-05-17 ASSESSMENT — PAIN DESCRIPTION - LOCATION
LOCATION: FOOT;LEG
LOCATION: FOOT;TOE (COMMENT WHICH ONE)
LOCATION: FOOT
LOCATION: FOOT
LOCATION: FOOT;LEG
LOCATION: LEG;FOOT

## 2023-05-17 NOTE — ANESTHESIA PRE PROCEDURE
Department of Anesthesiology  Preprocedure Note       Name:  Sherwin Neff   Age:  41 y.o.  :  1975                                          MRN:  [id number]         Date:  2023      Surgeon: Carmen Latham): Prabha Gardner MD    Procedure: Procedure(s):  THROMBECTOMY OF LEFT LEG BYPASS    Medications prior to admission:   Prior to Admission medications    Medication Sig Start Date End Date Taking? Authorizing Provider   sertraline (ZOLOFT) 50 MG tablet Take 1 tablet by mouth daily 23   Historical Provider, MD   oxyCODONE HCl (OXY-IR) 10 MG immediate release tablet Take 1 tablet by mouth every 4 hours as needed.  23   Historical Provider, MD   metoclopramide (REGLAN) 5 MG tablet Take 1 tablet by mouth 2 times daily as needed 3/20/23   Historical Provider, MD   cinacalcet (SENSIPAR) 60 MG tablet Take 1 tablet by mouth daily 20   Historical Provider, MD   acetaminophen (TYLENOL) 500 MG tablet Take 500 mg by mouth every 4 hours as needed 19   Ar Automatic Reconciliation   aspirin 81 MG EC tablet Take 1 tablet by mouth daily 22   Ar Automatic Reconciliation   atorvastatin (LIPITOR) 20 MG tablet Take 1 tablet by mouth daily    Ar Automatic Reconciliation   bisacodyl (DULCOLAX) 5 MG EC tablet Take 1 tablet by mouth daily 3/12/23   Ar Automatic Reconciliation   calcitRIOL (ROCALTROL) 0.5 MCG capsule Take 1 capsule by mouth daily    Ar Automatic Reconciliation   calcium acetate (PHOSLO) 667 MG CAPS capsule Take 2 capsules by mouth 3 times daily (with meals) 11/10/20   Ar Automatic Reconciliation   dicyclomine (BENTYL) 20 MG tablet Take 20 mg by mouth every 8 hours as needed 22   Ar Automatic Reconciliation   ergocalciferol (ERGOCALCIFEROL) 1.25 MG (21615 UT) capsule Take 50,000 Units by mouth every 7 days 10/15/21   Ar Automatic Reconciliation   metoprolol succinate (TOPROL XL) 25 MG extended release tablet Take 0.5 tablets by mouth daily 22   Ar Automatic Reconciliation

## 2023-05-18 ENCOUNTER — APPOINTMENT (OUTPATIENT)
Facility: HOSPITAL | Age: 48
DRG: 270 | End: 2023-05-18
Payer: MEDICARE

## 2023-05-18 ENCOUNTER — ANESTHESIA (OUTPATIENT)
Facility: HOSPITAL | Age: 48
End: 2023-05-18
Payer: MEDICARE

## 2023-05-18 LAB
ANION GAP SERPL CALC-SCNC: 5 MMOL/L (ref 5–15)
BUN SERPL-MCNC: 12 MG/DL (ref 6–20)
BUN/CREAT SERPL: 3 (ref 12–20)
CALCIUM SERPL-MCNC: 6.6 MG/DL (ref 8.5–10.1)
CHLORIDE SERPL-SCNC: 96 MMOL/L (ref 97–108)
CO2 SERPL-SCNC: 34 MMOL/L (ref 21–32)
COMMENT:: NORMAL
CREAT SERPL-MCNC: 3.91 MG/DL (ref 0.7–1.3)
ERYTHROCYTE [DISTWIDTH] IN BLOOD BY AUTOMATED COUNT: 13.7 % (ref 11.5–14.5)
GLUCOSE SERPL-MCNC: 87 MG/DL (ref 65–100)
HBV SURFACE AG SER QL: <0.1 INDEX
HBV SURFACE AG SER QL: NORMAL
HCT VFR BLD AUTO: 24.4 % (ref 36.6–50.3)
HGB BLD-MCNC: 7.5 G/DL (ref 12.1–17)
HISTORY CHECK: NORMAL
HIV1 P24 AG SERPL QL IA: NONREACTIVE
HIV1+2 AB SERPL QL IA: NONREACTIVE
MCH RBC QN AUTO: 30.7 PG (ref 26–34)
MCHC RBC AUTO-ENTMCNC: 30.7 G/DL (ref 30–36.5)
MCV RBC AUTO: 100 FL (ref 80–99)
NRBC # BLD: 0 K/UL (ref 0–0.01)
NRBC BLD-RTO: 0 PER 100 WBC
PLATELET # BLD AUTO: 194 K/UL (ref 150–400)
PMV BLD AUTO: 10.7 FL (ref 8.9–12.9)
POTASSIUM SERPL-SCNC: 3.9 MMOL/L (ref 3.5–5.1)
RBC # BLD AUTO: 2.44 M/UL (ref 4.1–5.7)
SODIUM SERPL-SCNC: 135 MMOL/L (ref 136–145)
SPECIMEN HOLD: NORMAL
WBC # BLD AUTO: 9.9 K/UL (ref 4.1–11.1)

## 2023-05-18 PROCEDURE — 6370000000 HC RX 637 (ALT 250 FOR IP): Performed by: SURGERY

## 2023-05-18 PROCEDURE — 3700000000 HC ANESTHESIA ATTENDED CARE: Performed by: SURGERY

## 2023-05-18 PROCEDURE — C1757 CATH, THROMBECTOMY/EMBOLECT: HCPCS | Performed by: SURGERY

## 2023-05-18 PROCEDURE — 85027 COMPLETE CBC AUTOMATED: CPT

## 2023-05-18 PROCEDURE — 6360000002 HC RX W HCPCS: Performed by: SURGERY

## 2023-05-18 PROCEDURE — 3600000003 HC SURGERY LEVEL 3 BASE: Performed by: SURGERY

## 2023-05-18 PROCEDURE — P9016 RBC LEUKOCYTES REDUCED: HCPCS

## 2023-05-18 PROCEDURE — B41G1ZZ FLUOROSCOPY OF LEFT LOWER EXTREMITY ARTERIES USING LOW OSMOLAR CONTRAST: ICD-10-PCS | Performed by: SURGERY

## 2023-05-18 PROCEDURE — 76000 FLUOROSCOPY <1 HR PHYS/QHP: CPT

## 2023-05-18 PROCEDURE — 2580000003 HC RX 258: Performed by: ANESTHESIOLOGY

## 2023-05-18 PROCEDURE — 04RL0JZ REPLACEMENT OF LEFT FEMORAL ARTERY WITH SYNTHETIC SUBSTITUTE, OPEN APPROACH: ICD-10-PCS | Performed by: SURGERY

## 2023-05-18 PROCEDURE — 36415 COLL VENOUS BLD VENIPUNCTURE: CPT

## 2023-05-18 PROCEDURE — 6360000002 HC RX W HCPCS: Performed by: REGISTERED NURSE

## 2023-05-18 PROCEDURE — 7100000000 HC PACU RECOVERY - FIRST 15 MIN: Performed by: SURGERY

## 2023-05-18 PROCEDURE — 86901 BLOOD TYPING SEROLOGIC RH(D): CPT

## 2023-05-18 PROCEDURE — 3600000013 HC SURGERY LEVEL 3 ADDTL 15MIN: Performed by: SURGERY

## 2023-05-18 PROCEDURE — 6360000002 HC RX W HCPCS: Performed by: ANESTHESIOLOGY

## 2023-05-18 PROCEDURE — P9045 ALBUMIN (HUMAN), 5%, 250 ML: HCPCS | Performed by: REGISTERED NURSE

## 2023-05-18 PROCEDURE — 73590 X-RAY EXAM OF LOWER LEG: CPT

## 2023-05-18 PROCEDURE — C1894 INTRO/SHEATH, NON-LASER: HCPCS | Performed by: SURGERY

## 2023-05-18 PROCEDURE — C1768 GRAFT, VASCULAR: HCPCS | Performed by: SURGERY

## 2023-05-18 PROCEDURE — 86850 RBC ANTIBODY SCREEN: CPT

## 2023-05-18 PROCEDURE — 6360000002 HC RX W HCPCS: Performed by: NURSE PRACTITIONER

## 2023-05-18 PROCEDURE — 2720000010 HC SURG SUPPLY STERILE: Performed by: SURGERY

## 2023-05-18 PROCEDURE — 6360000002 HC RX W HCPCS

## 2023-05-18 PROCEDURE — 6360000002 HC RX W HCPCS: Performed by: INTERNAL MEDICINE

## 2023-05-18 PROCEDURE — 80048 BASIC METABOLIC PNL TOTAL CA: CPT

## 2023-05-18 PROCEDURE — 1100000000 HC RM PRIVATE

## 2023-05-18 PROCEDURE — A4217 STERILE WATER/SALINE, 500 ML: HCPCS | Performed by: SURGERY

## 2023-05-18 PROCEDURE — 6360000004 HC RX CONTRAST MEDICATION: Performed by: SURGERY

## 2023-05-18 PROCEDURE — 7100000001 HC PACU RECOVERY - ADDTL 15 MIN: Performed by: SURGERY

## 2023-05-18 PROCEDURE — 2580000003 HC RX 258: Performed by: REGISTERED NURSE

## 2023-05-18 PROCEDURE — 86900 BLOOD TYPING SEROLOGIC ABO: CPT

## 2023-05-18 PROCEDURE — 2709999900 HC NON-CHARGEABLE SUPPLY: Performed by: SURGERY

## 2023-05-18 PROCEDURE — 2580000003 HC RX 258: Performed by: SURGERY

## 2023-05-18 PROCEDURE — 86923 COMPATIBILITY TEST ELECTRIC: CPT

## 2023-05-18 PROCEDURE — 36430 TRANSFUSION BLD/BLD COMPNT: CPT

## 2023-05-18 PROCEDURE — 2500000003 HC RX 250 WO HCPCS: Performed by: REGISTERED NURSE

## 2023-05-18 PROCEDURE — 3700000001 HC ADD 15 MINUTES (ANESTHESIA): Performed by: SURGERY

## 2023-05-18 DEVICE — DISTAFLO® BYPASS GRAFT WITH FLEX SMALL BEADING STANDARD CUFF, 6 MM X 60 CM
Type: IMPLANTABLE DEVICE | Site: LEG | Status: FUNCTIONAL
Brand: DISTAFLO® BYPASS GRAFTS

## 2023-05-18 RX ORDER — ONDANSETRON 2 MG/ML
INJECTION INTRAMUSCULAR; INTRAVENOUS PRN
Status: DISCONTINUED | OUTPATIENT
Start: 2023-05-18 | End: 2023-05-18 | Stop reason: SDUPTHER

## 2023-05-18 RX ORDER — METOCLOPRAMIDE HYDROCHLORIDE 5 MG/ML
10 INJECTION INTRAMUSCULAR; INTRAVENOUS
Status: DISCONTINUED | OUTPATIENT
Start: 2023-05-18 | End: 2023-05-18 | Stop reason: HOSPADM

## 2023-05-18 RX ORDER — FENTANYL CITRATE 50 UG/ML
50 INJECTION, SOLUTION INTRAMUSCULAR; INTRAVENOUS EVERY 5 MIN PRN
Status: COMPLETED | OUTPATIENT
Start: 2023-05-18 | End: 2023-05-18

## 2023-05-18 RX ORDER — SODIUM CHLORIDE 0.9 % (FLUSH) 0.9 %
5-40 SYRINGE (ML) INJECTION PRN
Status: DISCONTINUED | OUTPATIENT
Start: 2023-05-18 | End: 2023-05-18

## 2023-05-18 RX ORDER — METHYLPREDNISOLONE SODIUM SUCCINATE 125 MG/2ML
INJECTION, POWDER, LYOPHILIZED, FOR SOLUTION INTRAMUSCULAR; INTRAVENOUS PRN
Status: DISCONTINUED | OUTPATIENT
Start: 2023-05-18 | End: 2023-05-18 | Stop reason: SDUPTHER

## 2023-05-18 RX ORDER — DIPHENHYDRAMINE HYDROCHLORIDE 50 MG/ML
12.5 INJECTION INTRAMUSCULAR; INTRAVENOUS
Status: COMPLETED | OUTPATIENT
Start: 2023-05-18 | End: 2023-05-18

## 2023-05-18 RX ORDER — WATER 1000 ML/1000ML
INJECTION, SOLUTION INTRAVENOUS PRN
Status: DISCONTINUED | OUTPATIENT
Start: 2023-05-18 | End: 2023-05-18 | Stop reason: SDUPTHER

## 2023-05-18 RX ORDER — LIDOCAINE HYDROCHLORIDE 20 MG/ML
INJECTION, SOLUTION EPIDURAL; INFILTRATION; INTRACAUDAL; PERINEURAL PRN
Status: DISCONTINUED | OUTPATIENT
Start: 2023-05-18 | End: 2023-05-18 | Stop reason: SDUPTHER

## 2023-05-18 RX ORDER — NEOSTIGMINE METHYLSULFATE 1 MG/ML
INJECTION, SOLUTION INTRAVENOUS PRN
Status: DISCONTINUED | OUTPATIENT
Start: 2023-05-18 | End: 2023-05-18 | Stop reason: SDUPTHER

## 2023-05-18 RX ORDER — ALBUMIN, HUMAN INJ 5% 5 %
SOLUTION INTRAVENOUS PRN
Status: DISCONTINUED | OUTPATIENT
Start: 2023-05-18 | End: 2023-05-18 | Stop reason: SDUPTHER

## 2023-05-18 RX ORDER — GLYCOPYRROLATE 0.2 MG/ML
INJECTION INTRAMUSCULAR; INTRAVENOUS PRN
Status: DISCONTINUED | OUTPATIENT
Start: 2023-05-18 | End: 2023-05-18 | Stop reason: SDUPTHER

## 2023-05-18 RX ORDER — SODIUM CHLORIDE 9 MG/ML
INJECTION, SOLUTION INTRAVENOUS PRN
Status: DISCONTINUED | OUTPATIENT
Start: 2023-05-18 | End: 2023-05-19

## 2023-05-18 RX ORDER — SODIUM CHLORIDE 9 MG/ML
INJECTION, SOLUTION INTRAVENOUS PRN
Status: CANCELLED | OUTPATIENT
Start: 2023-05-18

## 2023-05-18 RX ORDER — DEXAMETHASONE SODIUM PHOSPHATE 4 MG/ML
INJECTION, SOLUTION INTRA-ARTICULAR; INTRALESIONAL; INTRAMUSCULAR; INTRAVENOUS; SOFT TISSUE PRN
Status: DISCONTINUED | OUTPATIENT
Start: 2023-05-18 | End: 2023-05-18 | Stop reason: SDUPTHER

## 2023-05-18 RX ORDER — ROCURONIUM BROMIDE 10 MG/ML
INJECTION, SOLUTION INTRAVENOUS PRN
Status: DISCONTINUED | OUTPATIENT
Start: 2023-05-18 | End: 2023-05-18 | Stop reason: SDUPTHER

## 2023-05-18 RX ORDER — CEFAZOLIN SODIUM 1 G/3ML
INJECTION, POWDER, FOR SOLUTION INTRAMUSCULAR; INTRAVENOUS PRN
Status: DISCONTINUED | OUTPATIENT
Start: 2023-05-18 | End: 2023-05-18 | Stop reason: SDUPTHER

## 2023-05-18 RX ORDER — SODIUM CHLORIDE 9 MG/ML
INJECTION, SOLUTION INTRAVENOUS PRN
Status: DISCONTINUED | OUTPATIENT
Start: 2023-05-18 | End: 2023-05-18 | Stop reason: HOSPADM

## 2023-05-18 RX ORDER — PROPOFOL 10 MG/ML
INJECTION, EMULSION INTRAVENOUS PRN
Status: DISCONTINUED | OUTPATIENT
Start: 2023-05-18 | End: 2023-05-18 | Stop reason: SDUPTHER

## 2023-05-18 RX ORDER — SODIUM CHLORIDE 9 MG/ML
INJECTION, SOLUTION INTRAVENOUS PRN
Status: DISCONTINUED | OUTPATIENT
Start: 2023-05-18 | End: 2023-05-18

## 2023-05-18 RX ORDER — SODIUM CHLORIDE 0.9 % (FLUSH) 0.9 %
5-40 SYRINGE (ML) INJECTION EVERY 12 HOURS SCHEDULED
Status: DISCONTINUED | OUTPATIENT
Start: 2023-05-18 | End: 2023-05-18 | Stop reason: HOSPADM

## 2023-05-18 RX ORDER — DIPHENHYDRAMINE HYDROCHLORIDE 50 MG/ML
INJECTION INTRAMUSCULAR; INTRAVENOUS PRN
Status: DISCONTINUED | OUTPATIENT
Start: 2023-05-18 | End: 2023-05-18 | Stop reason: SDUPTHER

## 2023-05-18 RX ORDER — HYDROMORPHONE HYDROCHLORIDE 1 MG/ML
0.25 INJECTION, SOLUTION INTRAMUSCULAR; INTRAVENOUS; SUBCUTANEOUS EVERY 5 MIN PRN
Status: DISCONTINUED | OUTPATIENT
Start: 2023-05-18 | End: 2023-05-18 | Stop reason: HOSPADM

## 2023-05-18 RX ORDER — FENTANYL CITRATE 50 UG/ML
INJECTION, SOLUTION INTRAMUSCULAR; INTRAVENOUS PRN
Status: DISCONTINUED | OUTPATIENT
Start: 2023-05-18 | End: 2023-05-18 | Stop reason: SDUPTHER

## 2023-05-18 RX ORDER — BIVALIRUDIN 250 MG/5ML
0.75 INJECTION, POWDER, LYOPHILIZED, FOR SOLUTION INTRAVENOUS ONCE
Status: DISCONTINUED | OUTPATIENT
Start: 2023-05-18 | End: 2023-05-18

## 2023-05-18 RX ORDER — MIDAZOLAM HYDROCHLORIDE 1 MG/ML
INJECTION INTRAMUSCULAR; INTRAVENOUS PRN
Status: DISCONTINUED | OUTPATIENT
Start: 2023-05-18 | End: 2023-05-18 | Stop reason: SDUPTHER

## 2023-05-18 RX ORDER — BIVALIRUDIN 250 MG/5ML
0.75 INJECTION, POWDER, LYOPHILIZED, FOR SOLUTION INTRAVENOUS ONCE
Status: COMPLETED | OUTPATIENT
Start: 2023-05-18 | End: 2023-05-18

## 2023-05-18 RX ORDER — PHENYLEPHRINE HCL IN 0.9% NACL 0.4MG/10ML
SYRINGE (ML) INTRAVENOUS PRN
Status: DISCONTINUED | OUTPATIENT
Start: 2023-05-18 | End: 2023-05-18 | Stop reason: SDUPTHER

## 2023-05-18 RX ORDER — FENTANYL CITRATE 50 UG/ML
INJECTION, SOLUTION INTRAMUSCULAR; INTRAVENOUS
Status: COMPLETED
Start: 2023-05-18 | End: 2023-05-18

## 2023-05-18 RX ORDER — SODIUM CHLORIDE 0.9 % (FLUSH) 0.9 %
5-40 SYRINGE (ML) INJECTION PRN
Status: DISCONTINUED | OUTPATIENT
Start: 2023-05-18 | End: 2023-05-18 | Stop reason: HOSPADM

## 2023-05-18 RX ORDER — SODIUM CHLORIDE 0.9 % (FLUSH) 0.9 %
5-40 SYRINGE (ML) INJECTION EVERY 12 HOURS SCHEDULED
Status: DISCONTINUED | OUTPATIENT
Start: 2023-05-18 | End: 2023-05-18

## 2023-05-18 RX ADMIN — OXYCODONE 10 MG: 5 TABLET ORAL at 05:07

## 2023-05-18 RX ADMIN — ONDANSETRON HYDROCHLORIDE 4 MG: 2 INJECTION, SOLUTION INTRAMUSCULAR; INTRAVENOUS at 10:04

## 2023-05-18 RX ADMIN — Medication 200 MCG: at 07:58

## 2023-05-18 RX ADMIN — DIPHENHYDRAMINE HYDROCHLORIDE 12.5 MG: 50 INJECTION, SOLUTION INTRAMUSCULAR; INTRAVENOUS at 16:08

## 2023-05-18 RX ADMIN — OXYCODONE 10 MG: 5 TABLET ORAL at 01:11

## 2023-05-18 RX ADMIN — GLYCOPYRROLATE 0.4 MG: 0.2 INJECTION, SOLUTION INTRAMUSCULAR; INTRAVENOUS at 10:01

## 2023-05-18 RX ADMIN — PHENYLEPHRINE HYDROCHLORIDE 80 MCG/MIN: 10 INJECTION INTRAVENOUS at 08:01

## 2023-05-18 RX ADMIN — PROPOFOL 30 MG: 10 INJECTION, EMULSION INTRAVENOUS at 08:45

## 2023-05-18 RX ADMIN — FENTANYL CITRATE 50 MCG: 50 INJECTION, SOLUTION INTRAMUSCULAR; INTRAVENOUS at 08:45

## 2023-05-18 RX ADMIN — Medication: at 11:53

## 2023-05-18 RX ADMIN — PROCHLORPERAZINE EDISYLATE 10 MG: 5 INJECTION, SOLUTION INTRAMUSCULAR; INTRAVENOUS at 18:36

## 2023-05-18 RX ADMIN — Medication 200 MCG: at 08:01

## 2023-05-18 RX ADMIN — METHYLPREDNISOLONE SODIUM SUCCINATE 125 MG: 125 INJECTION, POWDER, FOR SOLUTION INTRAMUSCULAR; INTRAVENOUS at 07:33

## 2023-05-18 RX ADMIN — APIXABAN 5 MG: 2.5 TABLET, FILM COATED ORAL at 21:46

## 2023-05-18 RX ADMIN — FENTANYL CITRATE 50 MCG: 50 INJECTION, SOLUTION INTRAMUSCULAR; INTRAVENOUS at 07:43

## 2023-05-18 RX ADMIN — PROPOFOL 140 MG: 10 INJECTION, EMULSION INTRAVENOUS at 07:44

## 2023-05-18 RX ADMIN — FENTANYL CITRATE 50 MCG: 50 INJECTION, SOLUTION INTRAMUSCULAR; INTRAVENOUS at 12:51

## 2023-05-18 RX ADMIN — Medication 3 AMPULE: at 06:38

## 2023-05-18 RX ADMIN — SODIUM CHLORIDE: 9 INJECTION, SOLUTION INTRAVENOUS at 06:38

## 2023-05-18 RX ADMIN — Medication 200 MCG: at 07:52

## 2023-05-18 RX ADMIN — FENTANYL CITRATE 50 MCG: 50 INJECTION, SOLUTION INTRAMUSCULAR; INTRAVENOUS at 11:48

## 2023-05-18 RX ADMIN — PHENYLEPHRINE HYDROCHLORIDE 60 MCG/MIN: 10 INJECTION INTRAVENOUS at 07:57

## 2023-05-18 RX ADMIN — Medication 1 AMPULE: at 21:46

## 2023-05-18 RX ADMIN — DOCUSATE SODIUM - SENNOSIDES 2 TABLET: 50; 8.6 TABLET, FILM COATED ORAL at 21:46

## 2023-05-18 RX ADMIN — DIPHENHYDRAMINE HYDROCHLORIDE 50 MG: 50 INJECTION, SOLUTION INTRAMUSCULAR; INTRAVENOUS at 07:33

## 2023-05-18 RX ADMIN — OXYCODONE 10 MG: 5 TABLET ORAL at 21:45

## 2023-05-18 RX ADMIN — MIDAZOLAM HYDROCHLORIDE 1 MG: 1 INJECTION, SOLUTION INTRAMUSCULAR; INTRAVENOUS at 07:33

## 2023-05-18 RX ADMIN — WATER 2 ML: 1 INJECTION INTRAMUSCULAR; INTRAVENOUS; SUBCUTANEOUS at 07:49

## 2023-05-18 RX ADMIN — OXYCODONE 10 MG: 5 TABLET ORAL at 13:29

## 2023-05-18 RX ADMIN — CEFAZOLIN 2 G: 1 INJECTION, POWDER, FOR SOLUTION INTRAMUSCULAR; INTRAVENOUS; PARENTERAL at 08:01

## 2023-05-18 RX ADMIN — DEXAMETHASONE SODIUM PHOSPHATE 4 MG: 4 INJECTION, SOLUTION INTRAMUSCULAR; INTRAVENOUS at 07:53

## 2023-05-18 RX ADMIN — BIVALIRUDIN 60 MG: 250 INJECTION, POWDER, LYOPHILIZED, FOR SOLUTION INTRAVENOUS at 07:49

## 2023-05-18 RX ADMIN — Medication 3 MG: at 10:01

## 2023-05-18 RX ADMIN — Medication 100 MCG: at 09:55

## 2023-05-18 RX ADMIN — BIVALIRUDIN 1.75 MG/KG/HR: 250 INJECTION, POWDER, LYOPHILIZED, FOR SOLUTION INTRAVENOUS at 07:57

## 2023-05-18 RX ADMIN — LIDOCAINE HYDROCHLORIDE 60 MG: 20 INJECTION, SOLUTION EPIDURAL; INFILTRATION; INTRACAUDAL; PERINEURAL at 07:43

## 2023-05-18 RX ADMIN — OXYCODONE 10 MG: 5 TABLET ORAL at 17:40

## 2023-05-18 RX ADMIN — ALBUMIN (HUMAN) 12.5 G: 12.5 INJECTION, SOLUTION INTRAVENOUS at 08:05

## 2023-05-18 RX ADMIN — SEVELAMER CARBONATE 1600 MG: 800 TABLET, FILM COATED ORAL at 15:18

## 2023-05-18 RX ADMIN — PANTOPRAZOLE SODIUM 40 MG: 40 TABLET, DELAYED RELEASE ORAL at 15:18

## 2023-05-18 RX ADMIN — PROCHLORPERAZINE EDISYLATE 10 MG: 5 INJECTION, SOLUTION INTRAMUSCULAR; INTRAVENOUS at 02:55

## 2023-05-18 RX ADMIN — PROPOFOL 120 MCG/KG/MIN: 10 INJECTION, EMULSION INTRAVENOUS at 07:50

## 2023-05-18 RX ADMIN — PHENYLEPHRINE HYDROCHLORIDE 20 MCG/MIN: 10 INJECTION INTRAVENOUS at 07:50

## 2023-05-18 RX ADMIN — EPOETIN ALFA-EPBX 10000 UNITS: 10000 INJECTION, SOLUTION INTRAVENOUS; SUBCUTANEOUS at 23:06

## 2023-05-18 RX ADMIN — SEVELAMER CARBONATE 1600 MG: 800 TABLET, FILM COATED ORAL at 22:30

## 2023-05-18 RX ADMIN — Medication 200 MCG: at 09:50

## 2023-05-18 RX ADMIN — ROCURONIUM BROMIDE 40 MG: 10 INJECTION INTRAVENOUS at 07:44

## 2023-05-18 ASSESSMENT — PAIN - FUNCTIONAL ASSESSMENT
PAIN_FUNCTIONAL_ASSESSMENT: ACTIVITIES ARE NOT PREVENTED
PAIN_FUNCTIONAL_ASSESSMENT: PREVENTS OR INTERFERES SOME ACTIVE ACTIVITIES AND ADLS
PAIN_FUNCTIONAL_ASSESSMENT: ACTIVITIES ARE NOT PREVENTED
PAIN_FUNCTIONAL_ASSESSMENT: 0-10
PAIN_FUNCTIONAL_ASSESSMENT: PREVENTS OR INTERFERES SOME ACTIVE ACTIVITIES AND ADLS
PAIN_FUNCTIONAL_ASSESSMENT: PREVENTS OR INTERFERES SOME ACTIVE ACTIVITIES AND ADLS

## 2023-05-18 ASSESSMENT — PAIN DESCRIPTION - PAIN TYPE
TYPE: ACUTE PAIN
TYPE: ACUTE PAIN
TYPE: SURGICAL PAIN
TYPE: ACUTE PAIN
TYPE: ACUTE PAIN
TYPE: SURGICAL PAIN
TYPE: SURGICAL PAIN

## 2023-05-18 ASSESSMENT — PAIN SCALES - GENERAL
PAINLEVEL_OUTOF10: 4
PAINLEVEL_OUTOF10: 2
PAINLEVEL_OUTOF10: 7
PAINLEVEL_OUTOF10: 2
PAINLEVEL_OUTOF10: 7
PAINLEVEL_OUTOF10: 2
PAINLEVEL_OUTOF10: 0
PAINLEVEL_OUTOF10: 2
PAINLEVEL_OUTOF10: 7
PAINLEVEL_OUTOF10: 6
PAINLEVEL_OUTOF10: 5
PAINLEVEL_OUTOF10: 6
PAINLEVEL_OUTOF10: 7
PAINLEVEL_OUTOF10: 5
PAINLEVEL_OUTOF10: 7
PAINLEVEL_OUTOF10: 2
PAINLEVEL_OUTOF10: 2
PAINLEVEL_OUTOF10: 0

## 2023-05-18 ASSESSMENT — PAIN DESCRIPTION - FREQUENCY
FREQUENCY: CONTINUOUS

## 2023-05-18 ASSESSMENT — PAIN DESCRIPTION - LOCATION
LOCATION: LEG
LOCATION: ANKLE
LOCATION: LEG
LOCATION: LEG

## 2023-05-18 ASSESSMENT — PAIN DESCRIPTION - ORIENTATION
ORIENTATION: LEFT

## 2023-05-18 ASSESSMENT — PAIN DESCRIPTION - DESCRIPTORS
DESCRIPTORS: BURNING
DESCRIPTORS: ACHING
DESCRIPTORS: BURNING
DESCRIPTORS: ACHING;THROBBING
DESCRIPTORS: BURNING
DESCRIPTORS: ACHING
DESCRIPTORS: BURNING

## 2023-05-18 ASSESSMENT — PAIN DESCRIPTION - ONSET
ONSET: ON-GOING

## 2023-05-18 ASSESSMENT — PAIN SCALES - WONG BAKER: WONGBAKER_NUMERICALRESPONSE: 0

## 2023-05-18 NOTE — ANESTHESIA PRE PROCEDURE
Department of Anesthesiology  Preprocedure Note       Name:  Kang Brooks   Age:  41 y.o.  :  1975                                          MRN:  [id number]         Date:  2023      Surgeon: Shine Ponce): Dolores Donis MD    Procedure: Procedure(s):  THROMBECTOMY OF LEFT LEG BYPASS    Medications prior to admission:   Prior to Admission medications    Medication Sig Start Date End Date Taking? Authorizing Provider   sertraline (ZOLOFT) 50 MG tablet Take 1 tablet by mouth daily 23  Yes Historical Provider, MD   oxyCODONE HCl (OXY-IR) 10 MG immediate release tablet Take 1 tablet by mouth every 4 hours as needed.  23  Yes Historical Provider, MD   metoclopramide (REGLAN) 5 MG tablet Take 1 tablet by mouth 2 times daily as needed 3/20/23  Yes Historical Provider, MD   cinacalcet (SENSIPAR) 60 MG tablet Take 1 tablet by mouth daily 20  Yes Historical Provider, MD   aspirin 81 MG EC tablet Take 1 tablet by mouth daily 22  Yes Ar Automatic Reconciliation   atorvastatin (LIPITOR) 20 MG tablet Take 1 tablet by mouth daily   Yes Ar Automatic Reconciliation   bisacodyl (DULCOLAX) 5 MG EC tablet Take 1 tablet by mouth daily 3/12/23  Yes Ar Automatic Reconciliation   calcitRIOL (ROCALTROL) 0.5 MCG capsule Take 1 capsule by mouth daily   Yes Ar Automatic Reconciliation   calcium acetate (PHOSLO) 667 MG CAPS capsule Take 2 capsules by mouth 3 times daily (with meals) 11/10/20  Yes Ar Automatic Reconciliation   metoprolol succinate (TOPROL XL) 25 MG extended release tablet Take 0.5 tablets by mouth daily 22  Yes Ar Automatic Reconciliation   pantoprazole (PROTONIX) 40 MG tablet Take 1 tablet by mouth 2 times daily (before meals) 22  Yes Ar Automatic Reconciliation   sucralfate (CARAFATE) 1 GM tablet Take 1 tablet by mouth 4 times daily (before meals and nightly) 22  Yes Ar Automatic Reconciliation   acetaminophen (TYLENOL) 500 MG tablet Take 500 mg by mouth every 4 hours as

## 2023-05-18 NOTE — ANESTHESIA POSTPROCEDURE EVALUATION
Department of Anesthesiology  Postprocedure Note    Patient: Charity Kendrick  MRN: [id number]  YOB: 1975  Date of evaluation: 5/18/2023      Procedure Summary     Date: 05/18/23 Room / Location: [facility] MAIN OR M10 / [facility] MA[facility] OR    Anesthesia Start: 0735 Anesthesia Stop: 3914    Procedure: THROMBECTOMY AND REVISION OF LEFT LEG FEMORAL TO TIBIAL BYPASS, LEFT LEG ANGIOGRAM (Left: Leg Lower) Diagnosis:       PAD (peripheral artery disease) ([facility])      (PAD (peripheral artery disease) ([facility] [I73.9])    Providers: Isaiah Hassan MD Responsible Provider: Yasmine James MD    Anesthesia Type: General ASA Status: 4          Anesthesia Type: General    Leigh Phase I: Leigh Score: 8    Leigh Phase II:        Anesthesia Post Evaluation    Patient location during evaluation: PACU  Patient participation: complete - patient participated  Level of consciousness: sleepy but conscious and responsive to verbal stimuli  Airway patency: patent  Nausea & Vomiting: no vomiting and no nausea  Complications: no  Cardiovascular status: blood pressure returned to baseline and hemodynamically stable  Respiratory status: acceptable  Hydration status: stable

## 2023-05-19 LAB
ABO + RH BLD: NORMAL
ALBUMIN SERPL-MCNC: 3.1 G/DL (ref 3.5–5)
ANION GAP SERPL CALC-SCNC: 3 MMOL/L (ref 5–15)
BLD PROD TYP BPU: NORMAL
BLOOD BANK DISPENSE STATUS: NORMAL
BLOOD GROUP ANTIBODIES SERPL: NORMAL
BPU ID: NORMAL
BUN SERPL-MCNC: 26 MG/DL (ref 6–20)
BUN/CREAT SERPL: 5 (ref 12–20)
CALCIUM SERPL-MCNC: 6.3 MG/DL (ref 8.5–10.1)
CHLORIDE SERPL-SCNC: 98 MMOL/L (ref 97–108)
CO2 SERPL-SCNC: 32 MMOL/L (ref 21–32)
CREAT SERPL-MCNC: 5.73 MG/DL (ref 0.7–1.3)
CROSSMATCH RESULT: NORMAL
ERYTHROCYTE [DISTWIDTH] IN BLOOD BY AUTOMATED COUNT: 14.7 % (ref 11.5–14.5)
GLUCOSE SERPL-MCNC: 100 MG/DL (ref 65–100)
HCT VFR BLD AUTO: 22.2 % (ref 36.6–50.3)
HGB BLD-MCNC: 7 G/DL (ref 12.1–17)
MCH RBC QN AUTO: 30.7 PG (ref 26–34)
MCHC RBC AUTO-ENTMCNC: 31.5 G/DL (ref 30–36.5)
MCV RBC AUTO: 97.4 FL (ref 80–99)
NRBC # BLD: 0 K/UL (ref 0–0.01)
NRBC BLD-RTO: 0 PER 100 WBC
PLATELET # BLD AUTO: 159 K/UL (ref 150–400)
PMV BLD AUTO: 11 FL (ref 8.9–12.9)
POTASSIUM SERPL-SCNC: 4.9 MMOL/L (ref 3.5–5.1)
RBC # BLD AUTO: 2.28 M/UL (ref 4.1–5.7)
SODIUM SERPL-SCNC: 133 MMOL/L (ref 136–145)
SPECIMEN EXP DATE BLD: NORMAL
UNIT DIVISION: 0
WBC # BLD AUTO: 11.7 K/UL (ref 4.1–11.1)

## 2023-05-19 PROCEDURE — 85027 COMPLETE CBC AUTOMATED: CPT

## 2023-05-19 PROCEDURE — 2580000003 HC RX 258: Performed by: SURGERY

## 2023-05-19 PROCEDURE — 6360000002 HC RX W HCPCS: Performed by: SURGERY

## 2023-05-19 PROCEDURE — 6360000002 HC RX W HCPCS

## 2023-05-19 PROCEDURE — 2580000003 HC RX 258

## 2023-05-19 PROCEDURE — 6370000000 HC RX 637 (ALT 250 FOR IP): Performed by: SURGERY

## 2023-05-19 PROCEDURE — 6370000000 HC RX 637 (ALT 250 FOR IP): Performed by: INTERNAL MEDICINE

## 2023-05-19 PROCEDURE — 82040 ASSAY OF SERUM ALBUMIN: CPT

## 2023-05-19 PROCEDURE — 2580000003 HC RX 258: Performed by: NURSE PRACTITIONER

## 2023-05-19 PROCEDURE — 1100000000 HC RM PRIVATE

## 2023-05-19 PROCEDURE — 6360000002 HC RX W HCPCS: Performed by: INTERNAL MEDICINE

## 2023-05-19 PROCEDURE — 80048 BASIC METABOLIC PNL TOTAL CA: CPT

## 2023-05-19 PROCEDURE — 6360000002 HC RX W HCPCS: Performed by: NURSE PRACTITIONER

## 2023-05-19 PROCEDURE — 2500000003 HC RX 250 WO HCPCS

## 2023-05-19 PROCEDURE — 36415 COLL VENOUS BLD VENIPUNCTURE: CPT

## 2023-05-19 RX ORDER — DIPHENHYDRAMINE HYDROCHLORIDE 50 MG/ML
25 INJECTION INTRAMUSCULAR; INTRAVENOUS ONCE
Status: COMPLETED | OUTPATIENT
Start: 2023-05-19 | End: 2023-05-19

## 2023-05-19 RX ORDER — SODIUM CHLORIDE 9 MG/ML
INJECTION, SOLUTION INTRAVENOUS CONTINUOUS
Status: DISCONTINUED | OUTPATIENT
Start: 2023-05-19 | End: 2023-05-24

## 2023-05-19 RX ORDER — DIPHENHYDRAMINE HCL 25 MG
25 CAPSULE ORAL EVERY 6 HOURS PRN
Status: DISCONTINUED | OUTPATIENT
Start: 2023-05-19 | End: 2023-05-21

## 2023-05-19 RX ORDER — DIPHENHYDRAMINE HYDROCHLORIDE 50 MG/ML
25 INJECTION INTRAMUSCULAR; INTRAVENOUS ONCE
Status: COMPLETED | OUTPATIENT
Start: 2023-05-20 | End: 2023-05-20

## 2023-05-19 RX ORDER — 0.9 % SODIUM CHLORIDE 0.9 %
500 INTRAVENOUS SOLUTION INTRAVENOUS ONCE
Status: COMPLETED | OUTPATIENT
Start: 2023-05-19 | End: 2023-05-19

## 2023-05-19 RX ORDER — CALCIUM GLUCONATE 20 MG/ML
1000 INJECTION, SOLUTION INTRAVENOUS ONCE
Status: COMPLETED | OUTPATIENT
Start: 2023-05-19 | End: 2023-05-19

## 2023-05-19 RX ORDER — DIPHENHYDRAMINE HYDROCHLORIDE 50 MG/ML
25 INJECTION INTRAMUSCULAR; INTRAVENOUS EVERY 6 HOURS PRN
Status: DISCONTINUED | OUTPATIENT
Start: 2023-05-19 | End: 2023-05-19

## 2023-05-19 RX ADMIN — ASPIRIN 81 MG: 81 TABLET, COATED ORAL at 08:58

## 2023-05-19 RX ADMIN — SERTRALINE 50 MG: 50 TABLET, FILM COATED ORAL at 08:58

## 2023-05-19 RX ADMIN — CALCITRIOL CAPSULES 0.25 MCG 0.5 MCG: 0.25 CAPSULE ORAL at 08:57

## 2023-05-19 RX ADMIN — Medication 1 AMPULE: at 08:58

## 2023-05-19 RX ADMIN — Medication 1 AMPULE: at 20:12

## 2023-05-19 RX ADMIN — OXYCODONE 10 MG: 5 TABLET ORAL at 05:53

## 2023-05-19 RX ADMIN — APIXABAN 5 MG: 2.5 TABLET, FILM COATED ORAL at 08:58

## 2023-05-19 RX ADMIN — OXYCODONE 10 MG: 5 TABLET ORAL at 20:09

## 2023-05-19 RX ADMIN — ACETAMINOPHEN 1000 MG: 500 TABLET ORAL at 13:57

## 2023-05-19 RX ADMIN — DIPHENHYDRAMINE HYDROCHLORIDE 25 MG: 50 INJECTION, SOLUTION INTRAMUSCULAR; INTRAVENOUS at 00:46

## 2023-05-19 RX ADMIN — OXYCODONE 10 MG: 5 TABLET ORAL at 15:32

## 2023-05-19 RX ADMIN — DIPHENHYDRAMINE HYDROCHLORIDE 25 MG: 50 INJECTION, SOLUTION INTRAMUSCULAR; INTRAVENOUS at 12:04

## 2023-05-19 RX ADMIN — PANTOPRAZOLE SODIUM 40 MG: 40 TABLET, DELAYED RELEASE ORAL at 05:53

## 2023-05-19 RX ADMIN — Medication: at 21:02

## 2023-05-19 RX ADMIN — CINACALCET 60 MG: 30 TABLET, FILM COATED ORAL at 08:57

## 2023-05-19 RX ADMIN — METOPROLOL SUCCINATE 12.5 MG: 25 TABLET, EXTENDED RELEASE ORAL at 08:57

## 2023-05-19 RX ADMIN — SEVELAMER CARBONATE 1600 MG: 800 TABLET, FILM COATED ORAL at 12:16

## 2023-05-19 RX ADMIN — DIPHENHYDRAMINE HYDROCHLORIDE 25 MG: 25 CAPSULE ORAL at 20:09

## 2023-05-19 RX ADMIN — DOCUSATE SODIUM - SENNOSIDES 2 TABLET: 50; 8.6 TABLET, FILM COATED ORAL at 20:09

## 2023-05-19 RX ADMIN — SEVELAMER CARBONATE 1600 MG: 800 TABLET, FILM COATED ORAL at 17:11

## 2023-05-19 RX ADMIN — CALCIUM GLUCONATE 1000 MG: 20 INJECTION, SOLUTION INTRAVENOUS at 05:52

## 2023-05-19 RX ADMIN — SODIUM CHLORIDE: 9 INJECTION, SOLUTION INTRAVENOUS at 09:24

## 2023-05-19 RX ADMIN — PANTOPRAZOLE SODIUM 40 MG: 40 TABLET, DELAYED RELEASE ORAL at 15:32

## 2023-05-19 RX ADMIN — OXYCODONE 10 MG: 5 TABLET ORAL at 10:27

## 2023-05-19 RX ADMIN — APIXABAN 5 MG: 2.5 TABLET, FILM COATED ORAL at 21:05

## 2023-05-19 RX ADMIN — SEVELAMER CARBONATE 1600 MG: 800 TABLET, FILM COATED ORAL at 08:09

## 2023-05-19 RX ADMIN — OXYCODONE 10 MG: 5 TABLET ORAL at 01:43

## 2023-05-19 RX ADMIN — SODIUM CHLORIDE, PRESERVATIVE FREE 10 ML: 5 INJECTION INTRAVENOUS at 20:12

## 2023-05-19 RX ADMIN — ATORVASTATIN CALCIUM 20 MG: 20 TABLET, FILM COATED ORAL at 08:58

## 2023-05-19 RX ADMIN — SODIUM CHLORIDE 500 ML: 9 INJECTION, SOLUTION INTRAVENOUS at 00:46

## 2023-05-19 RX ADMIN — DOCUSATE SODIUM - SENNOSIDES 2 TABLET: 50; 8.6 TABLET, FILM COATED ORAL at 08:58

## 2023-05-19 ASSESSMENT — PAIN SCALES - GENERAL
PAINLEVEL_OUTOF10: 5
PAINLEVEL_OUTOF10: 5
PAINLEVEL_OUTOF10: 7
PAINLEVEL_OUTOF10: 2
PAINLEVEL_OUTOF10: 6
PAINLEVEL_OUTOF10: 7
PAINLEVEL_OUTOF10: 6
PAINLEVEL_OUTOF10: 5
PAINLEVEL_OUTOF10: 4
PAINLEVEL_OUTOF10: 7
PAINLEVEL_OUTOF10: 7
PAINLEVEL_OUTOF10: 2
PAINLEVEL_OUTOF10: 5
PAINLEVEL_OUTOF10: 6
PAINLEVEL_OUTOF10: 2
PAINLEVEL_OUTOF10: 5
PAINLEVEL_OUTOF10: 5

## 2023-05-19 ASSESSMENT — PAIN DESCRIPTION - ONSET
ONSET: ON-GOING

## 2023-05-19 ASSESSMENT — PAIN DESCRIPTION - LOCATION
LOCATION: FOOT
LOCATION: FOOT
LOCATION: LEG
LOCATION: LEG
LOCATION: FOOT
LOCATION: LEG

## 2023-05-19 ASSESSMENT — PAIN DESCRIPTION - PAIN TYPE
TYPE: SURGICAL PAIN
TYPE: SURGICAL PAIN

## 2023-05-19 ASSESSMENT — PAIN DESCRIPTION - FREQUENCY
FREQUENCY: CONTINUOUS

## 2023-05-19 ASSESSMENT — PAIN - FUNCTIONAL ASSESSMENT
PAIN_FUNCTIONAL_ASSESSMENT: PREVENTS OR INTERFERES SOME ACTIVE ACTIVITIES AND ADLS

## 2023-05-19 ASSESSMENT — PAIN DESCRIPTION - ORIENTATION
ORIENTATION: LEFT

## 2023-05-19 ASSESSMENT — PAIN DESCRIPTION - DESCRIPTORS
DESCRIPTORS: THROBBING
DESCRIPTORS: THROBBING
DESCRIPTORS: ACHING;THROBBING

## 2023-05-20 LAB
ALBUMIN SERPL-MCNC: 3 G/DL (ref 3.5–5)
ALBUMIN/GLOB SERPL: 0.8 (ref 1.1–2.2)
ALP SERPL-CCNC: 404 U/L (ref 45–117)
ALT SERPL-CCNC: <6 U/L (ref 12–78)
ANION GAP SERPL CALC-SCNC: 4 MMOL/L (ref 5–15)
AST SERPL-CCNC: 13 U/L (ref 15–37)
BILIRUB SERPL-MCNC: 0.5 MG/DL (ref 0.2–1)
BUN SERPL-MCNC: 36 MG/DL (ref 6–20)
BUN/CREAT SERPL: 5 (ref 12–20)
CALCIUM SERPL-MCNC: 6.6 MG/DL (ref 8.5–10.1)
CHLORIDE SERPL-SCNC: 97 MMOL/L (ref 97–108)
CO2 SERPL-SCNC: 31 MMOL/L (ref 21–32)
CREAT SERPL-MCNC: 7.51 MG/DL (ref 0.7–1.3)
ERYTHROCYTE [DISTWIDTH] IN BLOOD BY AUTOMATED COUNT: 14.5 % (ref 11.5–14.5)
GLOBULIN SER CALC-MCNC: 3.6 G/DL (ref 2–4)
GLUCOSE SERPL-MCNC: 91 MG/DL (ref 65–100)
HCT VFR BLD AUTO: 22.1 % (ref 36.6–50.3)
HGB BLD-MCNC: 7 G/DL (ref 12.1–17)
MAGNESIUM SERPL-MCNC: 2.1 MG/DL (ref 1.6–2.4)
MCH RBC QN AUTO: 31.5 PG (ref 26–34)
MCHC RBC AUTO-ENTMCNC: 31.7 G/DL (ref 30–36.5)
MCV RBC AUTO: 99.5 FL (ref 80–99)
NRBC # BLD: 0 K/UL (ref 0–0.01)
NRBC BLD-RTO: 0 PER 100 WBC
PLATELET # BLD AUTO: 176 K/UL (ref 150–400)
PMV BLD AUTO: 10.6 FL (ref 8.9–12.9)
POTASSIUM SERPL-SCNC: 4.7 MMOL/L (ref 3.5–5.1)
PROT SERPL-MCNC: 6.6 G/DL (ref 6.4–8.2)
RBC # BLD AUTO: 2.22 M/UL (ref 4.1–5.7)
SODIUM SERPL-SCNC: 132 MMOL/L (ref 136–145)
WBC # BLD AUTO: 10.8 K/UL (ref 4.1–11.1)

## 2023-05-20 PROCEDURE — 83735 ASSAY OF MAGNESIUM: CPT

## 2023-05-20 PROCEDURE — 6360000002 HC RX W HCPCS: Performed by: INTERNAL MEDICINE

## 2023-05-20 PROCEDURE — 36415 COLL VENOUS BLD VENIPUNCTURE: CPT

## 2023-05-20 PROCEDURE — 2580000003 HC RX 258: Performed by: INTERNAL MEDICINE

## 2023-05-20 PROCEDURE — 6370000000 HC RX 637 (ALT 250 FOR IP): Performed by: SURGERY

## 2023-05-20 PROCEDURE — 90935 HEMODIALYSIS ONE EVALUATION: CPT

## 2023-05-20 PROCEDURE — 80053 COMPREHEN METABOLIC PANEL: CPT

## 2023-05-20 PROCEDURE — 6370000000 HC RX 637 (ALT 250 FOR IP): Performed by: INTERNAL MEDICINE

## 2023-05-20 PROCEDURE — 2580000003 HC RX 258: Performed by: SURGERY

## 2023-05-20 PROCEDURE — 6360000002 HC RX W HCPCS

## 2023-05-20 PROCEDURE — 85027 COMPLETE CBC AUTOMATED: CPT

## 2023-05-20 PROCEDURE — 1100000000 HC RM PRIVATE

## 2023-05-20 RX ORDER — METOPROLOL SUCCINATE 25 MG/1
25 TABLET, EXTENDED RELEASE ORAL DAILY
Status: DISCONTINUED | OUTPATIENT
Start: 2023-05-21 | End: 2023-05-22

## 2023-05-20 RX ORDER — METOPROLOL SUCCINATE 25 MG/1
12.5 TABLET, EXTENDED RELEASE ORAL ONCE
Status: COMPLETED | OUTPATIENT
Start: 2023-05-20 | End: 2023-05-20

## 2023-05-20 RX ORDER — DIPHENHYDRAMINE HYDROCHLORIDE 50 MG/ML
25 INJECTION INTRAMUSCULAR; INTRAVENOUS ONCE
Status: COMPLETED | OUTPATIENT
Start: 2023-05-20 | End: 2023-05-20

## 2023-05-20 RX ADMIN — OXYCODONE 10 MG: 5 TABLET ORAL at 23:27

## 2023-05-20 RX ADMIN — Medication 1 AMPULE: at 22:19

## 2023-05-20 RX ADMIN — METOPROLOL SUCCINATE 12.5 MG: 25 TABLET, EXTENDED RELEASE ORAL at 10:58

## 2023-05-20 RX ADMIN — OXYCODONE 10 MG: 5 TABLET ORAL at 04:11

## 2023-05-20 RX ADMIN — PANTOPRAZOLE SODIUM 40 MG: 40 TABLET, DELAYED RELEASE ORAL at 06:14

## 2023-05-20 RX ADMIN — CALCITRIOL CAPSULES 0.25 MCG 0.5 MCG: 0.25 CAPSULE ORAL at 08:07

## 2023-05-20 RX ADMIN — METOPROLOL SUCCINATE 12.5 MG: 25 TABLET, EXTENDED RELEASE ORAL at 08:07

## 2023-05-20 RX ADMIN — Medication: at 23:42

## 2023-05-20 RX ADMIN — Medication 1 AMPULE: at 08:06

## 2023-05-20 RX ADMIN — SERTRALINE 50 MG: 50 TABLET, FILM COATED ORAL at 08:07

## 2023-05-20 RX ADMIN — IRON SUCROSE 100 MG: 20 INJECTION, SOLUTION INTRAVENOUS at 18:24

## 2023-05-20 RX ADMIN — EPOETIN ALFA-EPBX 14000 UNITS: 10000 INJECTION, SOLUTION INTRAVENOUS; SUBCUTANEOUS at 22:19

## 2023-05-20 RX ADMIN — APIXABAN 5 MG: 2.5 TABLET, FILM COATED ORAL at 22:07

## 2023-05-20 RX ADMIN — SEVELAMER CARBONATE 1600 MG: 800 TABLET, FILM COATED ORAL at 11:00

## 2023-05-20 RX ADMIN — ASPIRIN 81 MG: 81 TABLET, COATED ORAL at 08:06

## 2023-05-20 RX ADMIN — DIPHENHYDRAMINE HYDROCHLORIDE 25 MG: 25 CAPSULE ORAL at 10:58

## 2023-05-20 RX ADMIN — ATORVASTATIN CALCIUM 20 MG: 20 TABLET, FILM COATED ORAL at 08:07

## 2023-05-20 RX ADMIN — DIPHENHYDRAMINE HYDROCHLORIDE 25 MG: 50 INJECTION, SOLUTION INTRAMUSCULAR; INTRAVENOUS at 06:14

## 2023-05-20 RX ADMIN — OXYCODONE 10 MG: 5 TABLET ORAL at 00:07

## 2023-05-20 RX ADMIN — DIPHENHYDRAMINE HYDROCHLORIDE 25 MG: 50 INJECTION, SOLUTION INTRAMUSCULAR; INTRAVENOUS at 00:07

## 2023-05-20 RX ADMIN — CINACALCET 60 MG: 30 TABLET, FILM COATED ORAL at 08:07

## 2023-05-20 RX ADMIN — SEVELAMER CARBONATE 1600 MG: 800 TABLET, FILM COATED ORAL at 08:06

## 2023-05-20 RX ADMIN — PANTOPRAZOLE SODIUM 40 MG: 40 TABLET, DELAYED RELEASE ORAL at 18:23

## 2023-05-20 RX ADMIN — DOCUSATE SODIUM - SENNOSIDES 2 TABLET: 50; 8.6 TABLET, FILM COATED ORAL at 08:07

## 2023-05-20 RX ADMIN — OXYCODONE 10 MG: 5 TABLET ORAL at 08:07

## 2023-05-20 RX ADMIN — DIPHENHYDRAMINE HYDROCHLORIDE 25 MG: 25 CAPSULE ORAL at 23:27

## 2023-05-20 RX ADMIN — DIPHENHYDRAMINE HYDROCHLORIDE 25 MG: 25 CAPSULE ORAL at 18:27

## 2023-05-20 RX ADMIN — ACETAMINOPHEN 1000 MG: 500 TABLET ORAL at 22:07

## 2023-05-20 RX ADMIN — OXYCODONE 10 MG: 5 TABLET ORAL at 18:23

## 2023-05-20 RX ADMIN — OXYCODONE 10 MG: 5 TABLET ORAL at 12:11

## 2023-05-20 RX ADMIN — SODIUM CHLORIDE, PRESERVATIVE FREE 10 ML: 5 INJECTION INTRAVENOUS at 22:20

## 2023-05-20 RX ADMIN — APIXABAN 5 MG: 2.5 TABLET, FILM COATED ORAL at 08:07

## 2023-05-20 RX ADMIN — DOCUSATE SODIUM - SENNOSIDES 2 TABLET: 50; 8.6 TABLET, FILM COATED ORAL at 22:07

## 2023-05-20 RX ADMIN — SEVELAMER CARBONATE 1600 MG: 800 TABLET, FILM COATED ORAL at 18:23

## 2023-05-20 ASSESSMENT — PAIN DESCRIPTION - DESCRIPTORS
DESCRIPTORS: ACHING;THROBBING
DESCRIPTORS: ACHING
DESCRIPTORS: ACHING;THROBBING
DESCRIPTORS: THROBBING;PRESSURE

## 2023-05-20 ASSESSMENT — PAIN DESCRIPTION - LOCATION
LOCATION: LEG

## 2023-05-20 ASSESSMENT — PAIN DESCRIPTION - ONSET
ONSET: ON-GOING

## 2023-05-20 ASSESSMENT — PAIN SCALES - GENERAL
PAINLEVEL_OUTOF10: 8
PAINLEVEL_OUTOF10: 7
PAINLEVEL_OUTOF10: 7
PAINLEVEL_OUTOF10: 6
PAINLEVEL_OUTOF10: 8
PAINLEVEL_OUTOF10: 2
PAINLEVEL_OUTOF10: 9
PAINLEVEL_OUTOF10: 2
PAINLEVEL_OUTOF10: 8
PAINLEVEL_OUTOF10: 8
PAINLEVEL_OUTOF10: 6
PAINLEVEL_OUTOF10: 7
PAINLEVEL_OUTOF10: 8
PAINLEVEL_OUTOF10: 7

## 2023-05-20 ASSESSMENT — PAIN DESCRIPTION - FREQUENCY
FREQUENCY: CONTINUOUS

## 2023-05-20 ASSESSMENT — PAIN DESCRIPTION - ORIENTATION
ORIENTATION: LEFT

## 2023-05-20 ASSESSMENT — PAIN - FUNCTIONAL ASSESSMENT
PAIN_FUNCTIONAL_ASSESSMENT: PREVENTS OR INTERFERES SOME ACTIVE ACTIVITIES AND ADLS

## 2023-05-20 ASSESSMENT — PAIN DESCRIPTION - PAIN TYPE: TYPE: SURGICAL PAIN

## 2023-05-21 PROCEDURE — 6370000000 HC RX 637 (ALT 250 FOR IP): Performed by: SURGERY

## 2023-05-21 PROCEDURE — 1100000000 HC RM PRIVATE

## 2023-05-21 PROCEDURE — 6370000000 HC RX 637 (ALT 250 FOR IP): Performed by: INTERNAL MEDICINE

## 2023-05-21 PROCEDURE — 6360000002 HC RX W HCPCS: Performed by: INTERNAL MEDICINE

## 2023-05-21 PROCEDURE — 2580000003 HC RX 258: Performed by: NURSE PRACTITIONER

## 2023-05-21 RX ORDER — DIPHENHYDRAMINE HYDROCHLORIDE 50 MG/ML
25 INJECTION INTRAMUSCULAR; INTRAVENOUS ONCE
Status: COMPLETED | OUTPATIENT
Start: 2023-05-21 | End: 2023-05-21

## 2023-05-21 RX ORDER — DIPHENHYDRAMINE HCL 25 MG
50 CAPSULE ORAL EVERY 8 HOURS PRN
Status: DISCONTINUED | OUTPATIENT
Start: 2023-05-21 | End: 2023-05-27 | Stop reason: HOSPADM

## 2023-05-21 RX ORDER — DIPHENHYDRAMINE HCL 25 MG
50 CAPSULE ORAL EVERY 6 HOURS PRN
Status: DISCONTINUED | OUTPATIENT
Start: 2023-05-21 | End: 2023-05-21

## 2023-05-21 RX ADMIN — PANTOPRAZOLE SODIUM 40 MG: 40 TABLET, DELAYED RELEASE ORAL at 05:22

## 2023-05-21 RX ADMIN — PANTOPRAZOLE SODIUM 40 MG: 40 TABLET, DELAYED RELEASE ORAL at 16:27

## 2023-05-21 RX ADMIN — DIPHENHYDRAMINE HYDROCHLORIDE 25 MG: 25 CAPSULE ORAL at 09:10

## 2023-05-21 RX ADMIN — OXYCODONE 10 MG: 5 TABLET ORAL at 18:48

## 2023-05-21 RX ADMIN — SEVELAMER CARBONATE 1600 MG: 800 TABLET, FILM COATED ORAL at 12:13

## 2023-05-21 RX ADMIN — Medication 1 AMPULE: at 20:53

## 2023-05-21 RX ADMIN — OXYCODONE 10 MG: 5 TABLET ORAL at 05:21

## 2023-05-21 RX ADMIN — SERTRALINE 50 MG: 50 TABLET, FILM COATED ORAL at 09:11

## 2023-05-21 RX ADMIN — OXYCODONE 10 MG: 5 TABLET ORAL at 09:11

## 2023-05-21 RX ADMIN — DOCUSATE SODIUM - SENNOSIDES 2 TABLET: 50; 8.6 TABLET, FILM COATED ORAL at 20:53

## 2023-05-21 RX ADMIN — SEVELAMER CARBONATE 1600 MG: 800 TABLET, FILM COATED ORAL at 05:22

## 2023-05-21 RX ADMIN — METOPROLOL SUCCINATE 25 MG: 25 TABLET, EXTENDED RELEASE ORAL at 09:11

## 2023-05-21 RX ADMIN — OXYCODONE 10 MG: 5 TABLET ORAL at 14:48

## 2023-05-21 RX ADMIN — SODIUM CHLORIDE: 9 INJECTION, SOLUTION INTRAVENOUS at 05:56

## 2023-05-21 RX ADMIN — OXYCODONE 10 MG: 5 TABLET ORAL at 23:00

## 2023-05-21 RX ADMIN — ONDANSETRON 4 MG: 4 TABLET, ORALLY DISINTEGRATING ORAL at 12:16

## 2023-05-21 RX ADMIN — ASPIRIN 81 MG: 81 TABLET, COATED ORAL at 09:10

## 2023-05-21 RX ADMIN — DOCUSATE SODIUM - SENNOSIDES 2 TABLET: 50; 8.6 TABLET, FILM COATED ORAL at 09:11

## 2023-05-21 RX ADMIN — CALCITRIOL CAPSULES 0.25 MCG 0.5 MCG: 0.25 CAPSULE ORAL at 09:11

## 2023-05-21 RX ADMIN — SEVELAMER CARBONATE 1600 MG: 800 TABLET, FILM COATED ORAL at 17:03

## 2023-05-21 RX ADMIN — Medication 1 AMPULE: at 09:10

## 2023-05-21 RX ADMIN — DIPHENHYDRAMINE HYDROCHLORIDE 50 MG: 25 CAPSULE ORAL at 14:48

## 2023-05-21 RX ADMIN — DIPHENHYDRAMINE HYDROCHLORIDE 25 MG: 50 INJECTION, SOLUTION INTRAMUSCULAR; INTRAVENOUS at 22:04

## 2023-05-21 RX ADMIN — ATORVASTATIN CALCIUM 20 MG: 20 TABLET, FILM COATED ORAL at 09:11

## 2023-05-21 RX ADMIN — CINACALCET 60 MG: 30 TABLET, FILM COATED ORAL at 09:10

## 2023-05-21 ASSESSMENT — PAIN SCALES - GENERAL
PAINLEVEL_OUTOF10: 6
PAINLEVEL_OUTOF10: 4
PAINLEVEL_OUTOF10: 6
PAINLEVEL_OUTOF10: 7
PAINLEVEL_OUTOF10: 8
PAINLEVEL_OUTOF10: 7

## 2023-05-21 ASSESSMENT — PAIN DESCRIPTION - LOCATION
LOCATION: LEG;FOOT
LOCATION: LEG
LOCATION: LEG;FOOT

## 2023-05-21 ASSESSMENT — PAIN DESCRIPTION - ORIENTATION
ORIENTATION: LEFT

## 2023-05-21 ASSESSMENT — PAIN DESCRIPTION - DESCRIPTORS
DESCRIPTORS: ACHING
DESCRIPTORS: ACHING

## 2023-05-22 ENCOUNTER — ANESTHESIA (OUTPATIENT)
Facility: HOSPITAL | Age: 48
End: 2023-05-22
Payer: MEDICARE

## 2023-05-22 ENCOUNTER — ANESTHESIA EVENT (OUTPATIENT)
Facility: HOSPITAL | Age: 48
End: 2023-05-22
Payer: MEDICARE

## 2023-05-22 LAB
ANION GAP SERPL CALC-SCNC: 4 MMOL/L (ref 5–15)
BASOPHILS # BLD: 0 K/UL (ref 0–0.1)
BASOPHILS NFR BLD: 0 % (ref 0–1)
BUN SERPL-MCNC: 31 MG/DL (ref 6–20)
BUN/CREAT SERPL: 5 (ref 12–20)
CALCIUM SERPL-MCNC: 6.6 MG/DL (ref 8.5–10.1)
CHLORIDE SERPL-SCNC: 93 MMOL/L (ref 97–108)
CO2 SERPL-SCNC: 31 MMOL/L (ref 21–32)
CREAT SERPL-MCNC: 6.51 MG/DL (ref 0.7–1.3)
DIFFERENTIAL METHOD BLD: ABNORMAL
EOSINOPHIL # BLD: 0 K/UL (ref 0–0.4)
EOSINOPHIL NFR BLD: 0 % (ref 0–7)
ERYTHROCYTE [DISTWIDTH] IN BLOOD BY AUTOMATED COUNT: 14.6 % (ref 11.5–14.5)
GLUCOSE SERPL-MCNC: 85 MG/DL (ref 65–100)
HCT VFR BLD AUTO: 20.1 % (ref 36.6–50.3)
HGB BLD-MCNC: 6.6 G/DL (ref 12.1–17)
HISTORY CHECK: NORMAL
HISTORY CHECK: NORMAL
IMM GRANULOCYTES # BLD AUTO: 0.2 K/UL (ref 0–0.04)
IMM GRANULOCYTES NFR BLD AUTO: 1 % (ref 0–0.5)
LYMPHOCYTES # BLD: 1.3 K/UL (ref 0.8–3.5)
LYMPHOCYTES NFR BLD: 7 % (ref 12–49)
MCH RBC QN AUTO: 32.2 PG (ref 26–34)
MCHC RBC AUTO-ENTMCNC: 32.8 G/DL (ref 30–36.5)
MCV RBC AUTO: 98 FL (ref 80–99)
MONOCYTES # BLD: 1.7 K/UL (ref 0–1)
MONOCYTES NFR BLD: 9 % (ref 5–13)
NEUTS SEG # BLD: 15.3 K/UL (ref 1.8–8)
NEUTS SEG NFR BLD: 83 % (ref 32–75)
NRBC # BLD: 0 K/UL (ref 0–0.01)
NRBC BLD-RTO: 0 PER 100 WBC
PLATELET # BLD AUTO: 194 K/UL (ref 150–400)
PMV BLD AUTO: 11 FL (ref 8.9–12.9)
POTASSIUM SERPL-SCNC: 4.9 MMOL/L (ref 3.5–5.1)
RBC # BLD AUTO: 2.05 M/UL (ref 4.1–5.7)
RBC MORPH BLD: ABNORMAL
SODIUM SERPL-SCNC: 128 MMOL/L (ref 136–145)
WBC # BLD AUTO: 18.5 K/UL (ref 4.1–11.1)

## 2023-05-22 PROCEDURE — P9041 ALBUMIN (HUMAN),5%, 50ML: HCPCS

## 2023-05-22 PROCEDURE — 86850 RBC ANTIBODY SCREEN: CPT

## 2023-05-22 PROCEDURE — 86901 BLOOD TYPING SEROLOGIC RH(D): CPT

## 2023-05-22 PROCEDURE — 86900 BLOOD TYPING SEROLOGIC ABO: CPT

## 2023-05-22 PROCEDURE — 2709999900 HC NON-CHARGEABLE SUPPLY: Performed by: SURGERY

## 2023-05-22 PROCEDURE — 88307 TISSUE EXAM BY PATHOLOGIST: CPT

## 2023-05-22 PROCEDURE — 36430 TRANSFUSION BLD/BLD COMPNT: CPT

## 2023-05-22 PROCEDURE — 3700000001 HC ADD 15 MINUTES (ANESTHESIA): Performed by: SURGERY

## 2023-05-22 PROCEDURE — 6360000002 HC RX W HCPCS: Performed by: ANESTHESIOLOGY

## 2023-05-22 PROCEDURE — 2500000003 HC RX 250 WO HCPCS

## 2023-05-22 PROCEDURE — 0Y6J0Z1 DETACHMENT AT LEFT LOWER LEG, HIGH, OPEN APPROACH: ICD-10-PCS | Performed by: SURGERY

## 2023-05-22 PROCEDURE — 3700000000 HC ANESTHESIA ATTENDED CARE: Performed by: SURGERY

## 2023-05-22 PROCEDURE — 86923 COMPATIBILITY TEST ELECTRIC: CPT

## 2023-05-22 PROCEDURE — 6370000000 HC RX 637 (ALT 250 FOR IP): Performed by: SURGERY

## 2023-05-22 PROCEDURE — 2580000003 HC RX 258

## 2023-05-22 PROCEDURE — 85025 COMPLETE CBC W/AUTO DIFF WBC: CPT

## 2023-05-22 PROCEDURE — 2500000003 HC RX 250 WO HCPCS: Performed by: NURSE ANESTHETIST, CERTIFIED REGISTERED

## 2023-05-22 PROCEDURE — P9040 RBC LEUKOREDUCED IRRADIATED: HCPCS

## 2023-05-22 PROCEDURE — 6360000002 HC RX W HCPCS: Performed by: INTERNAL MEDICINE

## 2023-05-22 PROCEDURE — 88311 DECALCIFY TISSUE: CPT

## 2023-05-22 PROCEDURE — 6370000000 HC RX 637 (ALT 250 FOR IP): Performed by: INTERNAL MEDICINE

## 2023-05-22 PROCEDURE — 76942 ECHO GUIDE FOR BIOPSY: CPT | Performed by: STUDENT IN AN ORGANIZED HEALTH CARE EDUCATION/TRAINING PROGRAM

## 2023-05-22 PROCEDURE — 3600000012 HC SURGERY LEVEL 2 ADDTL 15MIN: Performed by: SURGERY

## 2023-05-22 PROCEDURE — 86644 CMV ANTIBODY: CPT

## 2023-05-22 PROCEDURE — 7100000001 HC PACU RECOVERY - ADDTL 15 MIN: Performed by: SURGERY

## 2023-05-22 PROCEDURE — 80048 BASIC METABOLIC PNL TOTAL CA: CPT

## 2023-05-22 PROCEDURE — 6360000002 HC RX W HCPCS

## 2023-05-22 PROCEDURE — 36415 COLL VENOUS BLD VENIPUNCTURE: CPT

## 2023-05-22 PROCEDURE — L1830 KO IMMOB CANVAS LONG PRE OTS: HCPCS | Performed by: SURGERY

## 2023-05-22 PROCEDURE — 3600000002 HC SURGERY LEVEL 2 BASE: Performed by: SURGERY

## 2023-05-22 PROCEDURE — 2060000000 HC ICU INTERMEDIATE R&B

## 2023-05-22 PROCEDURE — 64447 NJX AA&/STRD FEMORAL NRV IMG: CPT | Performed by: STUDENT IN AN ORGANIZED HEALTH CARE EDUCATION/TRAINING PROGRAM

## 2023-05-22 PROCEDURE — 7100000000 HC PACU RECOVERY - FIRST 15 MIN: Performed by: SURGERY

## 2023-05-22 PROCEDURE — 6360000002 HC RX W HCPCS: Performed by: NURSE ANESTHETIST, CERTIFIED REGISTERED

## 2023-05-22 RX ORDER — ACETAMINOPHEN 325 MG/1
650 TABLET ORAL
Status: DISCONTINUED | OUTPATIENT
Start: 2023-05-22 | End: 2023-05-22 | Stop reason: HOSPADM

## 2023-05-22 RX ORDER — FENTANYL CITRATE 50 UG/ML
25 INJECTION, SOLUTION INTRAMUSCULAR; INTRAVENOUS EVERY 5 MIN PRN
Status: DISCONTINUED | OUTPATIENT
Start: 2023-05-22 | End: 2023-05-22 | Stop reason: HOSPADM

## 2023-05-22 RX ORDER — FENTANYL CITRATE 50 UG/ML
INJECTION, SOLUTION INTRAMUSCULAR; INTRAVENOUS PRN
Status: DISCONTINUED | OUTPATIENT
Start: 2023-05-22 | End: 2023-05-22 | Stop reason: SDUPTHER

## 2023-05-22 RX ORDER — FENTANYL CITRATE 50 UG/ML
100 INJECTION, SOLUTION INTRAMUSCULAR; INTRAVENOUS
Status: DISCONTINUED | OUTPATIENT
Start: 2023-05-22 | End: 2023-05-22 | Stop reason: HOSPADM

## 2023-05-22 RX ORDER — SODIUM CHLORIDE 9 MG/ML
INJECTION, SOLUTION INTRAVENOUS PRN
Status: DISCONTINUED | OUTPATIENT
Start: 2023-05-22 | End: 2023-05-23

## 2023-05-22 RX ORDER — MIDAZOLAM HYDROCHLORIDE 2 MG/2ML
2 INJECTION, SOLUTION INTRAMUSCULAR; INTRAVENOUS
Status: COMPLETED | OUTPATIENT
Start: 2023-05-22 | End: 2023-05-22

## 2023-05-22 RX ORDER — SODIUM CHLORIDE 0.9 % (FLUSH) 0.9 %
5-40 SYRINGE (ML) INJECTION EVERY 12 HOURS SCHEDULED
Status: DISCONTINUED | OUTPATIENT
Start: 2023-05-22 | End: 2023-05-22 | Stop reason: HOSPADM

## 2023-05-22 RX ORDER — ACETAMINOPHEN 500 MG
1000 TABLET ORAL ONCE
Status: DISCONTINUED | OUTPATIENT
Start: 2023-05-22 | End: 2023-05-22 | Stop reason: HOSPADM

## 2023-05-22 RX ORDER — MIDODRINE HYDROCHLORIDE 5 MG/1
5 TABLET ORAL 3 TIMES DAILY PRN
Status: DISCONTINUED | OUTPATIENT
Start: 2023-05-22 | End: 2023-05-27 | Stop reason: HOSPADM

## 2023-05-22 RX ORDER — ROPIVACAINE HYDROCHLORIDE 5 MG/ML
INJECTION, SOLUTION EPIDURAL; INFILTRATION; PERINEURAL PRN
Status: DISCONTINUED | OUTPATIENT
Start: 2023-05-22 | End: 2023-05-22

## 2023-05-22 RX ORDER — OXYCODONE HYDROCHLORIDE 5 MG/1
5 TABLET ORAL
Status: DISCONTINUED | OUTPATIENT
Start: 2023-05-22 | End: 2023-05-22 | Stop reason: HOSPADM

## 2023-05-22 RX ORDER — SODIUM CHLORIDE 9 MG/ML
INJECTION, SOLUTION INTRAVENOUS PRN
Status: DISCONTINUED | OUTPATIENT
Start: 2023-05-22 | End: 2023-05-22 | Stop reason: HOSPADM

## 2023-05-22 RX ORDER — 0.9 % SODIUM CHLORIDE 0.9 %
INTRAVENOUS SOLUTION INTRAVENOUS CONTINUOUS PRN
Status: DISCONTINUED | OUTPATIENT
Start: 2023-05-22 | End: 2023-05-22 | Stop reason: SDUPTHER

## 2023-05-22 RX ORDER — CEFAZOLIN SODIUM 1 G/3ML
INJECTION, POWDER, FOR SOLUTION INTRAMUSCULAR; INTRAVENOUS PRN
Status: DISCONTINUED | OUTPATIENT
Start: 2023-05-22 | End: 2023-05-22 | Stop reason: SDUPTHER

## 2023-05-22 RX ORDER — HYDROMORPHONE HYDROCHLORIDE 1 MG/ML
0.25 INJECTION, SOLUTION INTRAMUSCULAR; INTRAVENOUS; SUBCUTANEOUS EVERY 5 MIN PRN
Status: DISCONTINUED | OUTPATIENT
Start: 2023-05-22 | End: 2023-05-22 | Stop reason: HOSPADM

## 2023-05-22 RX ORDER — MIDAZOLAM HYDROCHLORIDE 1 MG/ML
INJECTION INTRAMUSCULAR; INTRAVENOUS PRN
Status: DISCONTINUED | OUTPATIENT
Start: 2023-05-22 | End: 2023-05-22 | Stop reason: SDUPTHER

## 2023-05-22 RX ORDER — ONDANSETRON 2 MG/ML
INJECTION INTRAMUSCULAR; INTRAVENOUS PRN
Status: DISCONTINUED | OUTPATIENT
Start: 2023-05-22 | End: 2023-05-22 | Stop reason: SDUPTHER

## 2023-05-22 RX ORDER — SODIUM CHLORIDE 0.9 % (FLUSH) 0.9 %
5-40 SYRINGE (ML) INJECTION PRN
Status: DISCONTINUED | OUTPATIENT
Start: 2023-05-22 | End: 2023-05-22 | Stop reason: HOSPADM

## 2023-05-22 RX ORDER — ALBUMIN, HUMAN INJ 5% 5 %
25 SOLUTION INTRAVENOUS ONCE
Status: COMPLETED | OUTPATIENT
Start: 2023-05-22 | End: 2023-05-22

## 2023-05-22 RX ORDER — DEXAMETHASONE SODIUM PHOSPHATE 4 MG/ML
4 INJECTION, SOLUTION INTRA-ARTICULAR; INTRALESIONAL; INTRAMUSCULAR; INTRAVENOUS; SOFT TISSUE
Status: DISCONTINUED | OUTPATIENT
Start: 2023-05-22 | End: 2023-05-22 | Stop reason: HOSPADM

## 2023-05-22 RX ORDER — PROCHLORPERAZINE EDISYLATE 5 MG/ML
5 INJECTION INTRAMUSCULAR; INTRAVENOUS
Status: DISCONTINUED | OUTPATIENT
Start: 2023-05-22 | End: 2023-05-22 | Stop reason: HOSPADM

## 2023-05-22 RX ORDER — ROCURONIUM BROMIDE 10 MG/ML
INJECTION, SOLUTION INTRAVENOUS PRN
Status: DISCONTINUED | OUTPATIENT
Start: 2023-05-22 | End: 2023-05-22 | Stop reason: SDUPTHER

## 2023-05-22 RX ORDER — METOPROLOL SUCCINATE 25 MG/1
12.5 TABLET, EXTENDED RELEASE ORAL DAILY
Status: DISCONTINUED | OUTPATIENT
Start: 2023-05-23 | End: 2023-05-27 | Stop reason: HOSPADM

## 2023-05-22 RX ORDER — SODIUM CHLORIDE, SODIUM LACTATE, POTASSIUM CHLORIDE, CALCIUM CHLORIDE 600; 310; 30; 20 MG/100ML; MG/100ML; MG/100ML; MG/100ML
INJECTION, SOLUTION INTRAVENOUS CONTINUOUS
Status: DISCONTINUED | OUTPATIENT
Start: 2023-05-22 | End: 2023-05-22 | Stop reason: HOSPADM

## 2023-05-22 RX ORDER — ROPIVACAINE HYDROCHLORIDE 5 MG/ML
INJECTION, SOLUTION EPIDURAL; INFILTRATION; PERINEURAL PRN
Status: DISCONTINUED | OUTPATIENT
Start: 2023-05-22 | End: 2023-05-22 | Stop reason: SDUPTHER

## 2023-05-22 RX ORDER — EPHEDRINE SULFATE/0.9% NACL/PF 50 MG/5 ML
SYRINGE (ML) INTRAVENOUS PRN
Status: DISCONTINUED | OUTPATIENT
Start: 2023-05-22 | End: 2023-05-22 | Stop reason: SDUPTHER

## 2023-05-22 RX ORDER — CALCIUM CHLORIDE 100 MG/ML
INJECTION INTRAVENOUS; INTRAVENTRICULAR PRN
Status: DISCONTINUED | OUTPATIENT
Start: 2023-05-22 | End: 2023-05-22 | Stop reason: SDUPTHER

## 2023-05-22 RX ORDER — KETAMINE HCL IN NACL, ISO-OSM 100MG/10ML
SYRINGE (ML) INJECTION PRN
Status: DISCONTINUED | OUTPATIENT
Start: 2023-05-22 | End: 2023-05-22 | Stop reason: SDUPTHER

## 2023-05-22 RX ORDER — ONDANSETRON 2 MG/ML
4 INJECTION INTRAMUSCULAR; INTRAVENOUS ONCE
Status: DISCONTINUED | OUTPATIENT
Start: 2023-05-22 | End: 2023-05-22 | Stop reason: HOSPADM

## 2023-05-22 RX ORDER — HYDRALAZINE HYDROCHLORIDE 20 MG/ML
10 INJECTION INTRAMUSCULAR; INTRAVENOUS
Status: DISCONTINUED | OUTPATIENT
Start: 2023-05-22 | End: 2023-05-22 | Stop reason: HOSPADM

## 2023-05-22 RX ORDER — PHENYLEPHRINE HCL IN 0.9% NACL 0.4MG/10ML
SYRINGE (ML) INTRAVENOUS PRN
Status: DISCONTINUED | OUTPATIENT
Start: 2023-05-22 | End: 2023-05-22 | Stop reason: SDUPTHER

## 2023-05-22 RX ADMIN — ROPIVACAINE HYDROCHLORIDE 75 MG: 5 INJECTION, SOLUTION EPIDURAL; INFILTRATION; PERINEURAL at 15:56

## 2023-05-22 RX ADMIN — DOCUSATE SODIUM - SENNOSIDES 2 TABLET: 50; 8.6 TABLET, FILM COATED ORAL at 21:01

## 2023-05-22 RX ADMIN — CINACALCET 60 MG: 30 TABLET, FILM COATED ORAL at 08:16

## 2023-05-22 RX ADMIN — ROCURONIUM BROMIDE 10 MG: 10 INJECTION INTRAVENOUS at 16:22

## 2023-05-22 RX ADMIN — PANTOPRAZOLE SODIUM 40 MG: 40 TABLET, DELAYED RELEASE ORAL at 08:14

## 2023-05-22 RX ADMIN — ONDANSETRON HYDROCHLORIDE 4 MG: 2 INJECTION, SOLUTION INTRAMUSCULAR; INTRAVENOUS at 17:38

## 2023-05-22 RX ADMIN — ALBUMIN (HUMAN) 25 G: 12.5 INJECTION, SOLUTION INTRAVENOUS at 07:21

## 2023-05-22 RX ADMIN — ATORVASTATIN CALCIUM 20 MG: 20 TABLET, FILM COATED ORAL at 08:15

## 2023-05-22 RX ADMIN — OXYCODONE 10 MG: 5 TABLET ORAL at 03:04

## 2023-05-22 RX ADMIN — Medication: at 19:58

## 2023-05-22 RX ADMIN — SODIUM CHLORIDE 1.7 ML/KG/HR: 900 INJECTION, SOLUTION INTRAVENOUS at 16:15

## 2023-05-22 RX ADMIN — FENTANYL CITRATE 50 MCG: 50 INJECTION, SOLUTION INTRAMUSCULAR; INTRAVENOUS at 15:46

## 2023-05-22 RX ADMIN — MIDAZOLAM 1 MG: 1 INJECTION INTRAMUSCULAR; INTRAVENOUS at 15:48

## 2023-05-22 RX ADMIN — PROPOFOL 50 MG: 10 INJECTION, EMULSION INTRAVENOUS at 16:17

## 2023-05-22 RX ADMIN — CALCIUM CHLORIDE 1 G: 100 INJECTION, SOLUTION INTRAVENOUS at 17:20

## 2023-05-22 RX ADMIN — ACETAMINOPHEN 1000 MG: 500 TABLET ORAL at 07:20

## 2023-05-22 RX ADMIN — FENTANYL CITRATE 50 MCG: 50 INJECTION, SOLUTION INTRAMUSCULAR; INTRAVENOUS at 15:48

## 2023-05-22 RX ADMIN — Medication: at 05:44

## 2023-05-22 RX ADMIN — ACETAMINOPHEN 1000 MG: 500 TABLET ORAL at 21:01

## 2023-05-22 RX ADMIN — ROCURONIUM BROMIDE 5 MG: 10 INJECTION INTRAVENOUS at 16:17

## 2023-05-22 RX ADMIN — ROCURONIUM BROMIDE 35 MG: 10 INJECTION INTRAVENOUS at 16:18

## 2023-05-22 RX ADMIN — ROPIVACAINE HYDROCHLORIDE 25 MG: 5 INJECTION, SOLUTION EPIDURAL; INFILTRATION; PERINEURAL at 16:01

## 2023-05-22 RX ADMIN — MIDAZOLAM 1 MG: 1 INJECTION INTRAMUSCULAR; INTRAVENOUS at 15:50

## 2023-05-22 RX ADMIN — MIDAZOLAM HYDROCHLORIDE 2 MG: 1 INJECTION, SOLUTION INTRAMUSCULAR; INTRAVENOUS at 15:39

## 2023-05-22 RX ADMIN — OXYCODONE 10 MG: 5 TABLET ORAL at 12:12

## 2023-05-22 RX ADMIN — Medication 10 MG: at 17:18

## 2023-05-22 RX ADMIN — Medication 1 AMPULE: at 21:00

## 2023-05-22 RX ADMIN — DOCUSATE SODIUM - SENNOSIDES 2 TABLET: 50; 8.6 TABLET, FILM COATED ORAL at 08:16

## 2023-05-22 RX ADMIN — DIPHENHYDRAMINE HYDROCHLORIDE 50 MG: 25 CAPSULE ORAL at 05:50

## 2023-05-22 RX ADMIN — Medication 100 MCG: at 17:12

## 2023-05-22 RX ADMIN — Medication 1.7 ML/KG/HR: at 16:21

## 2023-05-22 RX ADMIN — SUGAMMADEX 200 MG: 100 INJECTION, SOLUTION INTRAVENOUS at 17:38

## 2023-05-22 RX ADMIN — OXYCODONE 10 MG: 5 TABLET ORAL at 08:12

## 2023-05-22 RX ADMIN — Medication 10 MG: at 16:56

## 2023-05-22 RX ADMIN — CALCITRIOL CAPSULES 0.25 MCG 0.5 MCG: 0.25 CAPSULE ORAL at 08:17

## 2023-05-22 RX ADMIN — OXYCODONE 10 MG: 5 TABLET ORAL at 22:58

## 2023-05-22 RX ADMIN — MIDAZOLAM 1 MG: 1 INJECTION INTRAMUSCULAR; INTRAVENOUS at 15:46

## 2023-05-22 RX ADMIN — Medication 120 MCG: at 16:30

## 2023-05-22 RX ADMIN — Medication 1 AMPULE: at 08:26

## 2023-05-22 RX ADMIN — SERTRALINE 50 MG: 50 TABLET, FILM COATED ORAL at 08:15

## 2023-05-22 RX ADMIN — ROCURONIUM BROMIDE 20 MG: 10 INJECTION INTRAVENOUS at 16:44

## 2023-05-22 RX ADMIN — CEFAZOLIN 2 G: 1 INJECTION, POWDER, FOR SOLUTION INTRAMUSCULAR; INTRAVENOUS; PARENTERAL at 16:28

## 2023-05-22 RX ADMIN — PHENYLEPHRINE HYDROCHLORIDE 60 MCG/MIN: 10 INJECTION INTRAVENOUS at 16:17

## 2023-05-22 RX ADMIN — Medication 20 MG: at 16:17

## 2023-05-22 RX ADMIN — ASPIRIN 81 MG: 81 TABLET, COATED ORAL at 09:55

## 2023-05-22 RX ADMIN — Medication 10 MG: at 16:30

## 2023-05-22 ASSESSMENT — PAIN - FUNCTIONAL ASSESSMENT
PAIN_FUNCTIONAL_ASSESSMENT: ACTIVITIES ARE NOT PREVENTED

## 2023-05-22 ASSESSMENT — PAIN SCALES - GENERAL
PAINLEVEL_OUTOF10: 7
PAINLEVEL_OUTOF10: 7
PAINLEVEL_OUTOF10: 8
PAINLEVEL_OUTOF10: 8
PAINLEVEL_OUTOF10: 7
PAINLEVEL_OUTOF10: 5
PAINLEVEL_OUTOF10: 7
PAINLEVEL_OUTOF10: 5
PAINLEVEL_OUTOF10: 5
PAINLEVEL_OUTOF10: 7

## 2023-05-22 ASSESSMENT — PAIN DESCRIPTION - LOCATION
LOCATION: LEG

## 2023-05-22 ASSESSMENT — PAIN DESCRIPTION - ORIENTATION
ORIENTATION: LEFT

## 2023-05-22 ASSESSMENT — PAIN DESCRIPTION - DESCRIPTORS
DESCRIPTORS: SHARP
DESCRIPTORS: ACHING
DESCRIPTORS: SHARP
DESCRIPTORS: ACHING;SHARP

## 2023-05-22 ASSESSMENT — PAIN SCALES - WONG BAKER: WONGBAKER_NUMERICALRESPONSE: 0

## 2023-05-22 ASSESSMENT — PAIN DESCRIPTION - FREQUENCY: FREQUENCY: CONTINUOUS

## 2023-05-22 ASSESSMENT — PAIN DESCRIPTION - ONSET: ONSET: ON-GOING

## 2023-05-22 ASSESSMENT — PAIN DESCRIPTION - PAIN TYPE: TYPE: SURGICAL PAIN;ACUTE PAIN

## 2023-05-22 NOTE — BRIEF OP NOTE
Brief Postoperative Note      Patient: Narayan Diop  YOB: 1975  MRN: [id number]    Date of Procedure: 5/22/2023    Pre-Op Diagnosis Codes:     * End stage renal disease ([facility] [N18.6]    Post-Op Diagnosis:  Ischemic left foot       Procedure(s):  LEFT LEG AMPUTATION BELOW KNEE    Surgeon(s): Davon Lopes MD    Assistant:  Surgical Assistant: Jo Lozada    Anesthesia: General    Estimated Blood Loss (mL): 068     Complications: None    Specimens:   ID Type Source Tests Collected by Time Destination   A : Left below the knee amputation Tissue Leg SURGICAL PATHOLOGY Davon Lopes MD 5/22/2023 1653        Implants:  * No implants in log *      Drains: * No LDAs found *    Findings: BKA. Adequate bleeding at level of amputation.       Electronically signed by Deonna Juárez MD on 5/22/2023 at 5:43 PM

## 2023-05-22 NOTE — ANESTHESIA POSTPROCEDURE EVALUATION
Department of Anesthesiology  Postprocedure Note    Patient: Latrell Lewis  MRN: [id number]  YOB: 1975  Date of evaluation: 5/22/2023      Procedure Summary     Date: 05/11/23 Room / Location: [facility] MA[facility] OR M10 / [facility] MA[facility] OR    Anesthesia Start: 1631 Anesthesia Stop: 05/12/23 0845    Procedure: THROMBECTOMY AND REVISION OF LEFT LEG BYPASS, LEFT LOWER EXTREMITY ANGIOGRAM (REQ HYBRID ROOM) (Leg Upper) Diagnosis:       PAD (peripheral artery disease) ([facility])      ([facility] [I73.9[facility]    Providers: Micah Marcos MD Responsible Provider: Lexi Montoya MD    Anesthesia Type: general ASA Status: 3          Anesthesia Type: No value filed.     Leigh Phase I: Leigh Score: 8    Leigh Phase II:        Anesthesia Post Evaluation    Patient location during evaluation: PACU  Patient participation: complete - patient participated  Level of consciousness: awake and alert  Airway patency: patent  Nausea & Vomiting: no nausea  Complications: no  Cardiovascular status: hemodynamically stable  Respiratory status: acceptable  Hydration status: euvolemic

## 2023-05-22 NOTE — ANESTHESIA PROCEDURE NOTES
Peripheral Block    Patient location during procedure: procedure area  Reason for block: post-op pain management  Start time: 5/22/2023 2:46 PM  End time: 5/22/2023 4:01 PM  Staffing  Performed: anesthesiologist   Anesthesiologist: Neville Stephens MD  Preanesthetic Checklist  Completed: patient identified, IV checked, site marked, risks and benefits discussed, surgical/procedural consents, equipment checked, pre-op evaluation, timeout performed, anesthesia consent given, oxygen available, monitors applied/VS acknowledged, fire risk safety assessment completed and verbalized and blood product R/B/A discussed and consented  Peripheral Block   Patient position: right lateral decubitus  Prep: ChloraPrep  Provider prep: mask, sterile gloves and sterile gown  Patient monitoring: cardiac monitor, continuous pulse ox, frequent blood pressure checks, IV access, oxygen and responsive to questions  Block type: Posterior capsule of the knee (Lateral popliteal block)  Laterality: left  Injection technique: single-shot  Guidance: ultrasound guided    Needle   Needle type: insulated echogenic nerve stimulator needle   Needle gauge: 22 G  Needle localization: anatomical landmarks and ultrasound guidance  Assessment   Injection assessment: negative aspiration for heme, no paresthesia on injection, local visualized surrounding nerve on ultrasound and no intravascular symptoms  Paresthesia pain: none  Slow fractionated injection: yes  Hemodynamics: stable  Real-time US image taken/store: yes  Outcomes: uncomplicated and patient tolerated procedure well

## 2023-05-22 NOTE — ANESTHESIA PROCEDURE NOTES
Peripheral Block    Patient location during procedure: procedure area  Reason for block: post-op pain management  Start time: 5/22/2023 3:57 PM  End time: 5/22/2023 4:01 PM  Staffing  Performed: anesthesiologist   Anesthesiologist: Gerardine Gaucher, MD  Other anesthesia staff: Gely Shaikh MD  Preanesthetic Checklist  Completed: patient identified, IV checked, site marked, risks and benefits discussed, surgical/procedural consents, equipment checked, pre-op evaluation, timeout performed, anesthesia consent given, oxygen available, monitors applied/VS acknowledged, fire risk safety assessment completed and verbalized and blood product R/B/A discussed and consented  Peripheral Block   Patient position: supine  Prep: ChloraPrep  Provider prep: mask, sterile gloves and sterile gown  Patient monitoring: cardiac monitor, continuous pulse ox, frequent blood pressure checks, IV access, oxygen and responsive to questions  Block type: Femoral  Adductor canal  Laterality: left  Injection technique: single-shot  Guidance: ultrasound guided    Needle   Needle type: insulated echogenic nerve stimulator needle   Needle gauge: 22 G  Needle localization: anatomical landmarks and ultrasound guidance  Assessment   Injection assessment: negative aspiration for heme, no paresthesia on injection, local visualized surrounding nerve on ultrasound and no intravascular symptoms  Paresthesia pain: none  Slow fractionated injection: yes  Hemodynamics: stable  Real-time US image taken/store: yes  Outcomes: uncomplicated and patient tolerated procedure well

## 2023-05-22 NOTE — ANESTHESIA PROCEDURE NOTES
Peripheral Block    Patient location during procedure: procedure area  Reason for block: post-op pain management  Start time: 5/22/2023 3:46 PM  End time: 5/22/2023 3:56 PM  Staffing  Performed: anesthesiologist   Anesthesiologist: Mikala Lutz MD  Other anesthesia staff: Shanna Yadav MD  Preanesthetic Checklist  Completed: patient identified, IV checked, site marked, risks and benefits discussed, surgical/procedural consents, equipment checked, pre-op evaluation, timeout performed, anesthesia consent given, oxygen available, monitors applied/VS acknowledged, fire risk safety assessment completed and verbalized and blood product R/B/A discussed and consented  Peripheral Block   Patient position: supine  Prep: ChloraPrep  Provider prep: mask, sterile gloves and sterile gown  Patient monitoring: cardiac monitor, continuous pulse ox, frequent blood pressure checks, IV access, oxygen and responsive to questions  Block type: Posterior capsule of the knee (Left lateral popliteal block)  Laterality: left  Injection technique: single-shot  Guidance: ultrasound guided    Needle   Needle type: insulated echogenic nerve stimulator needle   Needle gauge: 22 G  Needle localization: anatomical landmarks and ultrasound guidance  Assessment   Injection assessment: negative aspiration for heme, no paresthesia on injection, local visualized surrounding nerve on ultrasound and no intravascular symptoms  Paresthesia pain: none  Slow fractionated injection: yes  Hemodynamics: stable  Real-time US image taken/store: yes  Outcomes: uncomplicated and patient tolerated procedure well

## 2023-05-22 NOTE — PERIOP NOTE
200- SBAR report received from [facility]. Patent arrived with second unit of blood not started. 1821- Blood order verified with Dr. Tha Sprague for second unit PRBC. Blood consent verified second unit PRB started. Verified with Trainum RN    7457- TRANSFER - OUT REPORT:    Verbal report given to [facility] RN on Javi Cortez  being transferred to  for routine post-op       Report consisted of patient's Situation, Background, Assessment and   Recommendations(SBAR). Information from the following report(s) Surgery Report, Intake/Output, MAR, and Cardiac Rhythm SR  was reviewed with the receiving nurse. [place] Assessment: No data recorded  Lines:   Peripheral IV 46/76/45 Right Cephalic (Active)   Site Assessment Clean, dry & intact 05/22/23 1500   Line Status Flushed;Capped 05/22/23 1500   Line Care Connections checked and tightened 05/22/23 1500   Phlebitis Assessment No symptoms 05/22/23 1500   Infiltration Assessment 0 05/22/23 1500   Alcohol Cap Used Yes 05/22/23 1500   Dressing Status Clean, dry & intact 05/22/23 1500   Dressing Type Transparent 05/22/23 1500       Peripheral IV 05/22/23 Proximal;Right; Anterior Forearm (Active)   Site Assessment Clean, dry & intact 05/22/23 1500   Line Status Normal saline locked 05/22/23 1500   Line Care Connections checked and tightened 05/22/23 1500   Phlebitis Assessment No symptoms 05/22/23 1500   Infiltration Assessment 0 05/22/23 1500   Alcohol Cap Used Yes 05/22/23 1500   Dressing Status Clean, dry & intact 05/22/23 1500   Dressing Type Transparent 05/22/23 1500   Dressing Intervention New 05/22/23 0725        Opportunity for questions and clarification was provided. Patient transported with:  Monitor, O2 @ 3lpm, and Registered Nurse  Chart   Incision site and Blood transfusion time reviewed with receiving RN.

## 2023-05-22 NOTE — ANESTHESIA POSTPROCEDURE EVALUATION
Department of Anesthesiology  Postprocedure Note    Patient: Serena Stewart  MRN: [id number]  YOB: 1975  Date of evaluation: 5/22/2023      Procedure Summary     Date: 05/22/23 Room / Location: [facility] MAIN OR M2 / [facility] MAIN OR    Anesthesia Start: 1607 Anesthesia Stop: 1800    Procedure: LEFT LEG AMPUTATION BELOW KNEE (Left: Knee) Diagnosis:       End stage renal disease ([address]      (End stage renal disease ([facility] [N18.6])    Providers: Haritha Pinedo MD Responsible Provider: Evelin Diop MD    Anesthesia Type: General, Regional ASA Status: 3          Anesthesia Type: General, Regional    Leigh Phase I: Leigh Score: 10    Leigh Phase II:        Anesthesia Post Evaluation    Patient location during evaluation: PACU  Patient participation: complete - patient participated  Level of consciousness: awake and alert  Airway patency: patent  Nausea & Vomiting: no nausea  Complications: no  Cardiovascular status: hemodynamically stable  Respiratory status: acceptable  Hydration status: euvolemic

## 2023-05-22 NOTE — PERIOP NOTE
Detail Level: Zone TRANSFER - IN REPORT:    Verbal report received from [facility], RN on Donaldo Barney  being received from  for ordered procedure      Report consisted of patients Situation, Background, Assessment and   Recommendations(SBAR). Information from the following report(s) Nurse Handoff Report, Intake/Output, MAR, and Cardiac Rhythm NSR  was reviewed with the receiving nurse. Opportunity for questions and clarification was provided.       Assessment completed upon patients arrival to unit and care assume    1535:  PRBC infusion started    1540:  Medicated patient with versed per [facility] for anxiety    1546:  Time out completed for LLE block    1600:  Block completed

## 2023-05-23 LAB
ABO + RH BLD: NORMAL
ALBUMIN SERPL-MCNC: 2.8 G/DL (ref 3.5–5)
ANION GAP SERPL CALC-SCNC: 7 MMOL/L (ref 5–15)
BLD PROD TYP BPU: NORMAL
BLD PROD TYP BPU: NORMAL
BLOOD BANK DISPENSE STATUS: NORMAL
BLOOD BANK DISPENSE STATUS: NORMAL
BLOOD GROUP ANTIBODIES SERPL: NORMAL
BPU ID: NORMAL
BPU ID: NORMAL
BUN SERPL-MCNC: 40 MG/DL (ref 6–20)
BUN/CREAT SERPL: 5 (ref 12–20)
CALCIUM SERPL-MCNC: 6.3 MG/DL (ref 8.5–10.1)
CHLORIDE SERPL-SCNC: 98 MMOL/L (ref 97–108)
CO2 SERPL-SCNC: 23 MMOL/L (ref 21–32)
CREAT SERPL-MCNC: 7.47 MG/DL (ref 0.7–1.3)
CROSSMATCH RESULT: NORMAL
CROSSMATCH RESULT: NORMAL
ERYTHROCYTE [DISTWIDTH] IN BLOOD BY AUTOMATED COUNT: 17.1 % (ref 11.5–14.5)
GLUCOSE SERPL-MCNC: 97 MG/DL (ref 65–100)
HCT VFR BLD AUTO: 25.5 % (ref 36.6–50.3)
HGB BLD-MCNC: 8.2 G/DL (ref 12.1–17)
MCH RBC QN AUTO: 31.7 PG (ref 26–34)
MCHC RBC AUTO-ENTMCNC: 32.2 G/DL (ref 30–36.5)
MCV RBC AUTO: 98.5 FL (ref 80–99)
NRBC # BLD: 0 K/UL (ref 0–0.01)
NRBC BLD-RTO: 0 PER 100 WBC
PLATELET # BLD AUTO: 167 K/UL (ref 150–400)
PMV BLD AUTO: 10.9 FL (ref 8.9–12.9)
POTASSIUM SERPL-SCNC: 5.3 MMOL/L (ref 3.5–5.1)
RBC # BLD AUTO: 2.59 M/UL (ref 4.1–5.7)
SODIUM SERPL-SCNC: 128 MMOL/L (ref 136–145)
SPECIMEN EXP DATE BLD: NORMAL
UNIT DIVISION: 0
UNIT DIVISION: 0
WBC # BLD AUTO: 13.5 K/UL (ref 4.1–11.1)

## 2023-05-23 PROCEDURE — 2060000000 HC ICU INTERMEDIATE R&B

## 2023-05-23 PROCEDURE — 6370000000 HC RX 637 (ALT 250 FOR IP): Performed by: SURGERY

## 2023-05-23 PROCEDURE — 36415 COLL VENOUS BLD VENIPUNCTURE: CPT

## 2023-05-23 PROCEDURE — 2580000003 HC RX 258: Performed by: NURSE PRACTITIONER

## 2023-05-23 PROCEDURE — 6360000002 HC RX W HCPCS: Performed by: INTERNAL MEDICINE

## 2023-05-23 PROCEDURE — 85027 COMPLETE CBC AUTOMATED: CPT

## 2023-05-23 PROCEDURE — 6370000000 HC RX 637 (ALT 250 FOR IP): Performed by: INTERNAL MEDICINE

## 2023-05-23 PROCEDURE — 82040 ASSAY OF SERUM ALBUMIN: CPT

## 2023-05-23 PROCEDURE — 80048 BASIC METABOLIC PNL TOTAL CA: CPT

## 2023-05-23 PROCEDURE — 2580000003 HC RX 258: Performed by: SURGERY

## 2023-05-23 PROCEDURE — 90935 HEMODIALYSIS ONE EVALUATION: CPT

## 2023-05-23 RX ORDER — BACLOFEN 10 MG/1
5 TABLET ORAL ONCE
Status: COMPLETED | OUTPATIENT
Start: 2023-05-23 | End: 2023-05-23

## 2023-05-23 RX ORDER — POLYETHYLENE GLYCOL 3350 17 G/17G
17 POWDER, FOR SOLUTION ORAL DAILY
Status: DISCONTINUED | OUTPATIENT
Start: 2023-05-23 | End: 2023-05-27 | Stop reason: HOSPADM

## 2023-05-23 RX ORDER — MINERAL OIL 100 G/100G
1 OIL RECTAL ONCE
Status: COMPLETED | OUTPATIENT
Start: 2023-05-23 | End: 2023-05-23

## 2023-05-23 RX ADMIN — OXYCODONE 10 MG: 5 TABLET ORAL at 06:00

## 2023-05-23 RX ADMIN — Medication: at 15:48

## 2023-05-23 RX ADMIN — OXYCODONE 10 MG: 5 TABLET ORAL at 22:51

## 2023-05-23 RX ADMIN — ACETAMINOPHEN 1000 MG: 500 TABLET ORAL at 21:48

## 2023-05-23 RX ADMIN — CALCITRIOL CAPSULES 0.25 MCG 0.5 MCG: 0.25 CAPSULE ORAL at 12:53

## 2023-05-23 RX ADMIN — Medication 1 AMPULE: at 21:48

## 2023-05-23 RX ADMIN — OXYCODONE 10 MG: 5 TABLET ORAL at 17:12

## 2023-05-23 RX ADMIN — ATORVASTATIN CALCIUM 20 MG: 20 TABLET, FILM COATED ORAL at 12:51

## 2023-05-23 RX ADMIN — METOPROLOL SUCCINATE 12.5 MG: 25 TABLET, EXTENDED RELEASE ORAL at 12:51

## 2023-05-23 RX ADMIN — DIPHENHYDRAMINE HYDROCHLORIDE 50 MG: 25 CAPSULE ORAL at 01:25

## 2023-05-23 RX ADMIN — OXYCODONE 10 MG: 5 TABLET ORAL at 12:47

## 2023-05-23 RX ADMIN — ASPIRIN 81 MG: 81 TABLET, COATED ORAL at 12:50

## 2023-05-23 RX ADMIN — POLYETHYLENE GLYCOL 3350 17 G: 17 POWDER, FOR SOLUTION ORAL at 12:53

## 2023-05-23 RX ADMIN — PANTOPRAZOLE SODIUM 40 MG: 40 TABLET, DELAYED RELEASE ORAL at 06:16

## 2023-05-23 RX ADMIN — ACETAMINOPHEN 1000 MG: 500 TABLET ORAL at 06:16

## 2023-05-23 RX ADMIN — EPOETIN ALFA-EPBX 14000 UNITS: 10000 INJECTION, SOLUTION INTRAVENOUS; SUBCUTANEOUS at 23:47

## 2023-05-23 RX ADMIN — BACLOFEN 5 MG: 10 TABLET ORAL at 18:15

## 2023-05-23 RX ADMIN — DOCUSATE SODIUM - SENNOSIDES 2 TABLET: 50; 8.6 TABLET, FILM COATED ORAL at 21:48

## 2023-05-23 RX ADMIN — PANTOPRAZOLE SODIUM 40 MG: 40 TABLET, DELAYED RELEASE ORAL at 17:14

## 2023-05-23 RX ADMIN — DOCUSATE SODIUM - SENNOSIDES 2 TABLET: 50; 8.6 TABLET, FILM COATED ORAL at 12:50

## 2023-05-23 RX ADMIN — Medication 1 AMPULE: at 13:06

## 2023-05-23 RX ADMIN — SODIUM CHLORIDE: 9 INJECTION, SOLUTION INTRAVENOUS at 19:38

## 2023-05-23 RX ADMIN — Medication 1 ENEMA: at 17:51

## 2023-05-23 RX ADMIN — SODIUM CHLORIDE, PRESERVATIVE FREE 10 ML: 5 INJECTION INTRAVENOUS at 21:48

## 2023-05-23 RX ADMIN — SERTRALINE 50 MG: 50 TABLET, FILM COATED ORAL at 12:51

## 2023-05-23 RX ADMIN — SEVELAMER CARBONATE 1600 MG: 800 TABLET, FILM COATED ORAL at 14:35

## 2023-05-23 ASSESSMENT — PAIN DESCRIPTION - PAIN TYPE
TYPE: SURGICAL PAIN
TYPE: SURGICAL PAIN;ACUTE PAIN

## 2023-05-23 ASSESSMENT — PAIN DESCRIPTION - DESCRIPTORS
DESCRIPTORS: THROBBING
DESCRIPTORS: ACHING;THROBBING
DESCRIPTORS: THROBBING
DESCRIPTORS: ACHING;THROBBING
DESCRIPTORS: THROBBING;ACHING
DESCRIPTORS: THROBBING;ACHING
DESCRIPTORS: THROBBING
DESCRIPTORS: THROBBING

## 2023-05-23 ASSESSMENT — PAIN SCALES - GENERAL
PAINLEVEL_OUTOF10: 7
PAINLEVEL_OUTOF10: 4
PAINLEVEL_OUTOF10: 2
PAINLEVEL_OUTOF10: 7
PAINLEVEL_OUTOF10: 6
PAINLEVEL_OUTOF10: 6
PAINLEVEL_OUTOF10: 5

## 2023-05-23 ASSESSMENT — PAIN DESCRIPTION - ORIENTATION
ORIENTATION: LEFT

## 2023-05-23 ASSESSMENT — PAIN DESCRIPTION - LOCATION
LOCATION: LEG

## 2023-05-23 ASSESSMENT — PAIN - FUNCTIONAL ASSESSMENT
PAIN_FUNCTIONAL_ASSESSMENT: ACTIVITIES ARE NOT PREVENTED

## 2023-05-23 ASSESSMENT — PAIN SCALES - WONG BAKER: WONGBAKER_NUMERICALRESPONSE: 0

## 2023-05-23 ASSESSMENT — PAIN DESCRIPTION - FREQUENCY: FREQUENCY: CONTINUOUS

## 2023-05-23 NOTE — CARE COORDINATION
Transition of Care Plan:     RUR: 23% High risk  Prior Level of Functioning: Home with sister  Disposition: therapy to assess   If SNF or IPR: Date FOC offered: N/A  Date FOC received: N/A  Accepting facility: N/A  Date authorization started with reference number: N/A  Date authorization received and expires: N/A  Follow up appointments: PCP  DME needed: TBD  Transportation at discharge: Family to transport  IM/IMM Medicare/ letter given: to be provided  Is patient a [place] and connected with [facility]? N/A              If yes, was [employer] transfer form completed and VA notified? N/A  Caregiver Contact:Ashley Bergeron (Other)   [phone]   Discharge Caregiver contacted prior to discharge? If Pt desires   Care Conference needed? N/A  Barriers to discharge:  Medical stability          Per chart review, left BKA on 5/22. Receives HD T, Th, Sat [facility]. Therapy will need to assess discharge needs.        Irving Carson, CM  [place]

## 2023-05-23 NOTE — FLOWSHEET NOTE
Primary RN SBAR: Shaka Alberto RN  Patient Education: HD Treatment  Hepatitis B Surface Ag   Date/Time Value Ref Range Status   05/18/2023 10:25 AM <0.10 Index Final     Hep B S Ab   Date/Time Value Ref Range Status   09/23/2022 10:42 AM REACTIVE (A) NR   Final       05/23/23 0805   Vital Signs   BP 93/68   Temp 98.6 °F (37 °C)   Pulse 91   Respirations 20   Pain Assessment   Pain Assessment None - Denies Pain   Treatment   Time On 0805   Treatment Goal 2500 ml   Observations & Evaluations   Level of Consciousness 0   Oriented X 4   Heart Rhythm Regular   Respiratory Quality/Effort Unlabored   O2 Device None (Room air)   Bilateral Breath Sounds Clear;Diminished   Skin Color Other (comment)  (appropriate for ethnicity)   Skin Condition/Temp Dry; Warm   Appetite Good   Abdomen Inspection Soft   Bowel Sounds (All Quadrants) Active   Edema Left upper extremity;Right lower extremity   LUE Edema +3;+4   RLE Edema +1;+2   Technical Checks   Dialysis Machine No. [id number]   RO Machine Number [id number]   Dialyzer Lot No. [id number]   Tubing Lot Number [id number]   All Connections Secure Yes   NS Bag Yes   Saline Line Double Clamped Yes   Dialyzer Revaclear 300   Prime Volume (mL) 200 mL   ICEBOAT I;C;E;B;O;A;T   RO Machine Log Sheet Completed Yes   Machine Alarm Self Test Completed; Passed   Air Foam Detector Tested;Proper Function;pH Reading   Extracorporeal Circuit Tested for Integrity Yes   Machine Conductivity 13.7   Manual Conductivity 13.8   Manual Ph 7.2   Bath Temperature 98.6 °F (37 °C)   Treatment Initiation   Dialyze Hours 3.5   Treatment  Initiation Universal Precautions maintained;Lines secured to patient; Connections secured;Prime given;Venous Parameters set; Arterial Parameters set; Air foam detector engaged;Dialysate;Saline line double clamped; Revaclear Dialyzer   Dialysis Bath   K+ (Potassium) 2   Ca+ (Calcium) 3   Na+ (Sodium) 138   HCO3 (Bicarb) 40   Hemodialysis Fistula/Graft Arteriovenous vein graft Superior Arm

## 2023-05-23 NOTE — FLOWSHEET NOTE
05/23/23 1136   Vital Signs   BP (!) 168/58   Temp 98.4 °F (36.9 °C)   Pulse (!) 114   Respirations 18   Pain Assessment   Pain Assessment None - Denies Pain   Post-Hemodialysis Assessment   Post-Treatment Procedures Blood returned; Access bleeding time < 10 minutes   Machine Disinfection Process Exterior Machine Disinfection   Rinseback Volume (ml) 300 ml   Blood Volume Processed (Liters) 79.1 l/min   Dialyzer Clearance Lightly streaked   Duration of Treatment (minutes) 210 minutes   Hemodialysis Intake (ml) 500 ml   Hemodialysis Output (ml) 3000 ml   NET Removed (ml) 2500   Tolerated Treatment Good   Bilateral Breath Sounds Diminished   Edema Left upper extremity;Right lower extremity   LUE Edema +3;+4   RLE Edema +1;+2   Physician Notified Yes   Time Off 1136   Patient Disposition Return to room     Primary RN SBAR: Evelin Lan RN  Comments: Pt tolerated treatment well. KASANDRA more swollen than R. Vascular surgery Np and Dr. Zakia Comer made aware.

## 2023-05-24 LAB
ANION GAP SERPL CALC-SCNC: 7 MMOL/L (ref 5–15)
BUN SERPL-MCNC: 18 MG/DL (ref 6–20)
BUN/CREAT SERPL: 4 (ref 12–20)
CALCIUM SERPL-MCNC: 7.6 MG/DL (ref 8.5–10.1)
CHLORIDE SERPL-SCNC: 96 MMOL/L (ref 97–108)
CO2 SERPL-SCNC: 28 MMOL/L (ref 21–32)
CREAT SERPL-MCNC: 4.85 MG/DL (ref 0.7–1.3)
ERYTHROCYTE [DISTWIDTH] IN BLOOD BY AUTOMATED COUNT: 16.3 % (ref 11.5–14.5)
GLUCOSE SERPL-MCNC: 94 MG/DL (ref 65–100)
HCT VFR BLD AUTO: 25.4 % (ref 36.6–50.3)
HGB BLD-MCNC: 7.7 G/DL (ref 12.1–17)
MCH RBC QN AUTO: 30.7 PG (ref 26–34)
MCHC RBC AUTO-ENTMCNC: 30.3 G/DL (ref 30–36.5)
MCV RBC AUTO: 101.2 FL (ref 80–99)
NRBC # BLD: 0 K/UL (ref 0–0.01)
NRBC BLD-RTO: 0 PER 100 WBC
PLATELET # BLD AUTO: 196 K/UL (ref 150–400)
PMV BLD AUTO: 10.8 FL (ref 8.9–12.9)
POTASSIUM SERPL-SCNC: 4.4 MMOL/L (ref 3.5–5.1)
RBC # BLD AUTO: 2.51 M/UL (ref 4.1–5.7)
SODIUM SERPL-SCNC: 131 MMOL/L (ref 136–145)
WBC # BLD AUTO: 14 K/UL (ref 4.1–11.1)

## 2023-05-24 PROCEDURE — 97530 THERAPEUTIC ACTIVITIES: CPT

## 2023-05-24 PROCEDURE — 80048 BASIC METABOLIC PNL TOTAL CA: CPT

## 2023-05-24 PROCEDURE — 6370000000 HC RX 637 (ALT 250 FOR IP): Performed by: SURGERY

## 2023-05-24 PROCEDURE — 97116 GAIT TRAINING THERAPY: CPT

## 2023-05-24 PROCEDURE — 36415 COLL VENOUS BLD VENIPUNCTURE: CPT

## 2023-05-24 PROCEDURE — 6360000002 HC RX W HCPCS: Performed by: STUDENT IN AN ORGANIZED HEALTH CARE EDUCATION/TRAINING PROGRAM

## 2023-05-24 PROCEDURE — 97165 OT EVAL LOW COMPLEX 30 MIN: CPT

## 2023-05-24 PROCEDURE — 2500000003 HC RX 250 WO HCPCS: Performed by: NURSE PRACTITIONER

## 2023-05-24 PROCEDURE — 97162 PT EVAL MOD COMPLEX 30 MIN: CPT

## 2023-05-24 PROCEDURE — 97535 SELF CARE MNGMENT TRAINING: CPT

## 2023-05-24 PROCEDURE — 6370000000 HC RX 637 (ALT 250 FOR IP): Performed by: INTERNAL MEDICINE

## 2023-05-24 PROCEDURE — 2060000000 HC ICU INTERMEDIATE R&B

## 2023-05-24 PROCEDURE — 85027 COMPLETE CBC AUTOMATED: CPT

## 2023-05-24 RX ORDER — HYDROMORPHONE HYDROCHLORIDE 1 MG/ML
1 INJECTION, SOLUTION INTRAMUSCULAR; INTRAVENOUS; SUBCUTANEOUS EVERY 4 HOURS PRN
Status: DISCONTINUED | OUTPATIENT
Start: 2023-05-24 | End: 2023-05-26

## 2023-05-24 RX ORDER — DIPHENHYDRAMINE HYDROCHLORIDE 50 MG/ML
25 INJECTION INTRAMUSCULAR; INTRAVENOUS ONCE
Status: COMPLETED | OUTPATIENT
Start: 2023-05-24 | End: 2023-05-24

## 2023-05-24 RX ORDER — CALCIUM GLUCONATE 20 MG/ML
2000 INJECTION, SOLUTION INTRAVENOUS ONCE
Status: COMPLETED | OUTPATIENT
Start: 2023-05-24 | End: 2023-05-24

## 2023-05-24 RX ORDER — HYDROMORPHONE HYDROCHLORIDE 2 MG/1
2 TABLET ORAL EVERY 4 HOURS PRN
Status: DISCONTINUED | OUTPATIENT
Start: 2023-05-24 | End: 2023-05-27 | Stop reason: HOSPADM

## 2023-05-24 RX ADMIN — ACETAMINOPHEN 1000 MG: 500 TABLET ORAL at 05:19

## 2023-05-24 RX ADMIN — Medication 1 AMPULE: at 20:37

## 2023-05-24 RX ADMIN — SERTRALINE 50 MG: 50 TABLET, FILM COATED ORAL at 08:30

## 2023-05-24 RX ADMIN — OXYCODONE 10 MG: 5 TABLET ORAL at 03:18

## 2023-05-24 RX ADMIN — DIPHENHYDRAMINE HYDROCHLORIDE 50 MG: 25 CAPSULE ORAL at 05:19

## 2023-05-24 RX ADMIN — SEVELAMER CARBONATE 1600 MG: 800 TABLET, FILM COATED ORAL at 16:31

## 2023-05-24 RX ADMIN — DIPHENHYDRAMINE HYDROCHLORIDE 50 MG: 25 CAPSULE ORAL at 20:34

## 2023-05-24 RX ADMIN — ASPIRIN 81 MG: 81 TABLET, COATED ORAL at 08:29

## 2023-05-24 RX ADMIN — CALCITRIOL CAPSULES 0.25 MCG 0.5 MCG: 0.25 CAPSULE ORAL at 08:30

## 2023-05-24 RX ADMIN — ATORVASTATIN CALCIUM 20 MG: 20 TABLET, FILM COATED ORAL at 08:30

## 2023-05-24 RX ADMIN — HYDROMORPHONE HYDROCHLORIDE 1 MG: 1 INJECTION, SOLUTION INTRAMUSCULAR; INTRAVENOUS; SUBCUTANEOUS at 20:35

## 2023-05-24 RX ADMIN — OXYCODONE 10 MG: 5 TABLET ORAL at 09:47

## 2023-05-24 RX ADMIN — Medication 1 AMPULE: at 08:34

## 2023-05-24 RX ADMIN — SEVELAMER CARBONATE 1600 MG: 800 TABLET, FILM COATED ORAL at 08:29

## 2023-05-24 RX ADMIN — HYDROMORPHONE HYDROCHLORIDE 1 MG: 1 INJECTION, SOLUTION INTRAMUSCULAR; INTRAVENOUS; SUBCUTANEOUS at 16:32

## 2023-05-24 RX ADMIN — DOCUSATE SODIUM - SENNOSIDES 2 TABLET: 50; 8.6 TABLET, FILM COATED ORAL at 08:30

## 2023-05-24 RX ADMIN — PANTOPRAZOLE SODIUM 40 MG: 40 TABLET, DELAYED RELEASE ORAL at 16:31

## 2023-05-24 RX ADMIN — SEVELAMER CARBONATE 1600 MG: 800 TABLET, FILM COATED ORAL at 13:08

## 2023-05-24 RX ADMIN — PANTOPRAZOLE SODIUM 40 MG: 40 TABLET, DELAYED RELEASE ORAL at 08:30

## 2023-05-24 RX ADMIN — DIPHENHYDRAMINE HYDROCHLORIDE 25 MG: 50 INJECTION, SOLUTION INTRAMUSCULAR; INTRAVENOUS at 09:47

## 2023-05-24 RX ADMIN — CALCIUM GLUCONATE 2000 MG: 20 INJECTION, SOLUTION INTRAVENOUS at 16:32

## 2023-05-24 RX ADMIN — METOPROLOL SUCCINATE 12.5 MG: 25 TABLET, EXTENDED RELEASE ORAL at 08:29

## 2023-05-24 RX ADMIN — DOCUSATE SODIUM - SENNOSIDES 2 TABLET: 50; 8.6 TABLET, FILM COATED ORAL at 21:45

## 2023-05-24 RX ADMIN — POLYETHYLENE GLYCOL 3350 17 G: 17 POWDER, FOR SOLUTION ORAL at 08:30

## 2023-05-24 RX ADMIN — ACETAMINOPHEN 1000 MG: 500 TABLET ORAL at 21:45

## 2023-05-24 ASSESSMENT — PAIN DESCRIPTION - LOCATION
LOCATION: LEG

## 2023-05-24 ASSESSMENT — PAIN DESCRIPTION - FREQUENCY: FREQUENCY: INTERMITTENT

## 2023-05-24 ASSESSMENT — PAIN DESCRIPTION - DESCRIPTORS
DESCRIPTORS: THROBBING
DESCRIPTORS: ACHING;THROBBING
DESCRIPTORS: ACHING
DESCRIPTORS: THROBBING

## 2023-05-24 ASSESSMENT — PAIN DESCRIPTION - ORIENTATION
ORIENTATION: LEFT

## 2023-05-24 ASSESSMENT — PAIN SCALES - GENERAL
PAINLEVEL_OUTOF10: 7
PAINLEVEL_OUTOF10: 7
PAINLEVEL_OUTOF10: 8
PAINLEVEL_OUTOF10: 5
PAINLEVEL_OUTOF10: 4
PAINLEVEL_OUTOF10: 7

## 2023-05-24 ASSESSMENT — PAIN DESCRIPTION - PAIN TYPE: TYPE: SURGICAL PAIN

## 2023-05-25 LAB
ALBUMIN SERPL-MCNC: 2.9 G/DL (ref 3.5–5)
ALBUMIN SERPL-MCNC: 3 G/DL (ref 3.5–5)
ANION GAP SERPL CALC-SCNC: 6 MMOL/L (ref 5–15)
ANION GAP SERPL CALC-SCNC: 7 MMOL/L (ref 5–15)
BUN SERPL-MCNC: 25 MG/DL (ref 6–20)
BUN SERPL-MCNC: 31 MG/DL (ref 6–20)
BUN/CREAT SERPL: 4 (ref 12–20)
BUN/CREAT SERPL: 5 (ref 12–20)
CALCIUM SERPL-MCNC: 7.6 MG/DL (ref 8.5–10.1)
CALCIUM SERPL-MCNC: 7.7 MG/DL (ref 8.5–10.1)
CHLORIDE SERPL-SCNC: 91 MMOL/L (ref 97–108)
CHLORIDE SERPL-SCNC: 95 MMOL/L (ref 97–108)
CO2 SERPL-SCNC: 23 MMOL/L (ref 21–32)
CO2 SERPL-SCNC: 30 MMOL/L (ref 21–32)
CREAT SERPL-MCNC: 6.15 MG/DL (ref 0.7–1.3)
CREAT SERPL-MCNC: 6.51 MG/DL (ref 0.7–1.3)
GLUCOSE SERPL-MCNC: 100 MG/DL (ref 65–100)
GLUCOSE SERPL-MCNC: 83 MG/DL (ref 65–100)
PHOSPHATE SERPL-MCNC: 2.6 MG/DL (ref 2.6–4.7)
PHOSPHATE SERPL-MCNC: 3.1 MG/DL (ref 2.6–4.7)
POTASSIUM SERPL-SCNC: 5.2 MMOL/L (ref 3.5–5.1)
POTASSIUM SERPL-SCNC: 5.4 MMOL/L (ref 3.5–5.1)
SODIUM SERPL-SCNC: 125 MMOL/L (ref 136–145)
SODIUM SERPL-SCNC: 127 MMOL/L (ref 136–145)

## 2023-05-25 PROCEDURE — 6360000002 HC RX W HCPCS: Performed by: STUDENT IN AN ORGANIZED HEALTH CARE EDUCATION/TRAINING PROGRAM

## 2023-05-25 PROCEDURE — 6370000000 HC RX 637 (ALT 250 FOR IP): Performed by: SURGERY

## 2023-05-25 PROCEDURE — 6360000002 HC RX W HCPCS: Performed by: SURGERY

## 2023-05-25 PROCEDURE — 6360000002 HC RX W HCPCS: Performed by: INTERNAL MEDICINE

## 2023-05-25 PROCEDURE — 6370000000 HC RX 637 (ALT 250 FOR IP): Performed by: INTERNAL MEDICINE

## 2023-05-25 PROCEDURE — 80069 RENAL FUNCTION PANEL: CPT

## 2023-05-25 PROCEDURE — 36415 COLL VENOUS BLD VENIPUNCTURE: CPT

## 2023-05-25 PROCEDURE — 2060000000 HC ICU INTERMEDIATE R&B

## 2023-05-25 PROCEDURE — 6370000000 HC RX 637 (ALT 250 FOR IP): Performed by: NURSE PRACTITIONER

## 2023-05-25 PROCEDURE — 2580000003 HC RX 258: Performed by: SURGERY

## 2023-05-25 PROCEDURE — 2500000003 HC RX 250 WO HCPCS: Performed by: NURSE PRACTITIONER

## 2023-05-25 PROCEDURE — 90935 HEMODIALYSIS ONE EVALUATION: CPT

## 2023-05-25 RX ORDER — DIPHENHYDRAMINE HYDROCHLORIDE 50 MG/ML
25 INJECTION INTRAMUSCULAR; INTRAVENOUS ONCE
Status: COMPLETED | OUTPATIENT
Start: 2023-05-25 | End: 2023-05-25

## 2023-05-25 RX ORDER — HYDROXYZINE HYDROCHLORIDE 25 MG/1
25 TABLET, FILM COATED ORAL ONCE
Status: DISCONTINUED | OUTPATIENT
Start: 2023-05-25 | End: 2023-05-25

## 2023-05-25 RX ADMIN — DOCUSATE SODIUM - SENNOSIDES 2 TABLET: 50; 8.6 TABLET, FILM COATED ORAL at 21:14

## 2023-05-25 RX ADMIN — Medication 1 AMPULE: at 21:14

## 2023-05-25 RX ADMIN — SERTRALINE 50 MG: 50 TABLET, FILM COATED ORAL at 12:52

## 2023-05-25 RX ADMIN — Medication 1 AMPULE: at 12:55

## 2023-05-25 RX ADMIN — HYDROMORPHONE HYDROCHLORIDE 1 MG: 1 INJECTION, SOLUTION INTRAMUSCULAR; INTRAVENOUS; SUBCUTANEOUS at 21:14

## 2023-05-25 RX ADMIN — SEVELAMER CARBONATE 1600 MG: 800 TABLET, FILM COATED ORAL at 05:26

## 2023-05-25 RX ADMIN — DIPHENHYDRAMINE HYDROCHLORIDE 25 MG: 50 INJECTION, SOLUTION INTRAMUSCULAR; INTRAVENOUS at 16:16

## 2023-05-25 RX ADMIN — HYDROMORPHONE HYDROCHLORIDE 1 MG: 1 INJECTION, SOLUTION INTRAMUSCULAR; INTRAVENOUS; SUBCUTANEOUS at 05:25

## 2023-05-25 RX ADMIN — PANTOPRAZOLE SODIUM 40 MG: 40 TABLET, DELAYED RELEASE ORAL at 05:26

## 2023-05-25 RX ADMIN — SODIUM CHLORIDE, PRESERVATIVE FREE 10 ML: 5 INJECTION INTRAVENOUS at 05:26

## 2023-05-25 RX ADMIN — PANTOPRAZOLE SODIUM 40 MG: 40 TABLET, DELAYED RELEASE ORAL at 15:31

## 2023-05-25 RX ADMIN — HYDROMORPHONE HYDROCHLORIDE 1 MG: 1 INJECTION, SOLUTION INTRAMUSCULAR; INTRAVENOUS; SUBCUTANEOUS at 00:43

## 2023-05-25 RX ADMIN — HYDROMORPHONE HYDROCHLORIDE 2 MG: 2 TABLET ORAL at 17:45

## 2023-05-25 RX ADMIN — SODIUM CHLORIDE, PRESERVATIVE FREE 10 ML: 5 INJECTION INTRAVENOUS at 21:14

## 2023-05-25 RX ADMIN — DIPHENHYDRAMINE HYDROCHLORIDE 50 MG: 25 CAPSULE ORAL at 14:41

## 2023-05-25 RX ADMIN — MIDODRINE HYDROCHLORIDE 5 MG: 5 TABLET ORAL at 15:31

## 2023-05-25 RX ADMIN — ASPIRIN 81 MG: 81 TABLET, COATED ORAL at 12:52

## 2023-05-25 RX ADMIN — EPOETIN ALFA-EPBX 14000 UNITS: 10000 INJECTION, SOLUTION INTRAVENOUS; SUBCUTANEOUS at 21:21

## 2023-05-25 RX ADMIN — ATORVASTATIN CALCIUM 20 MG: 20 TABLET, FILM COATED ORAL at 12:52

## 2023-05-25 RX ADMIN — IRON SUCROSE 100 MG: 20 INJECTION, SOLUTION INTRAVENOUS at 18:01

## 2023-05-25 RX ADMIN — ACETAMINOPHEN 1000 MG: 500 TABLET ORAL at 21:14

## 2023-05-25 RX ADMIN — CALCITRIOL CAPSULES 0.25 MCG 0.5 MCG: 0.25 CAPSULE ORAL at 12:52

## 2023-05-25 RX ADMIN — DIPHENHYDRAMINE HYDROCHLORIDE 50 MG: 25 CAPSULE ORAL at 05:26

## 2023-05-25 RX ADMIN — METOPROLOL SUCCINATE 12.5 MG: 25 TABLET, EXTENDED RELEASE ORAL at 12:51

## 2023-05-25 RX ADMIN — ACETAMINOPHEN 1000 MG: 500 TABLET ORAL at 13:27

## 2023-05-25 RX ADMIN — HYDROMORPHONE HYDROCHLORIDE 2 MG: 2 TABLET ORAL at 12:52

## 2023-05-25 RX ADMIN — SEVELAMER CARBONATE 1600 MG: 800 TABLET, FILM COATED ORAL at 17:32

## 2023-05-25 RX ADMIN — DOCUSATE SODIUM - SENNOSIDES 2 TABLET: 50; 8.6 TABLET, FILM COATED ORAL at 12:51

## 2023-05-25 ASSESSMENT — PAIN DESCRIPTION - LOCATION
LOCATION: LEG

## 2023-05-25 ASSESSMENT — PAIN DESCRIPTION - ORIENTATION
ORIENTATION: LEFT

## 2023-05-25 ASSESSMENT — PAIN DESCRIPTION - PAIN TYPE
TYPE: SURGICAL PAIN

## 2023-05-25 ASSESSMENT — PAIN SCALES - GENERAL
PAINLEVEL_OUTOF10: 7
PAINLEVEL_OUTOF10: 4
PAINLEVEL_OUTOF10: 7
PAINLEVEL_OUTOF10: 5
PAINLEVEL_OUTOF10: 4
PAINLEVEL_OUTOF10: 7
PAINLEVEL_OUTOF10: 6
PAINLEVEL_OUTOF10: 4
PAINLEVEL_OUTOF10: 4

## 2023-05-25 ASSESSMENT — PAIN DESCRIPTION - DESCRIPTORS
DESCRIPTORS: ACHING

## 2023-05-25 ASSESSMENT — PAIN DESCRIPTION - ONSET
ONSET: ON-GOING

## 2023-05-25 ASSESSMENT — PAIN - FUNCTIONAL ASSESSMENT
PAIN_FUNCTIONAL_ASSESSMENT: ACTIVITIES ARE NOT PREVENTED

## 2023-05-25 ASSESSMENT — PAIN DESCRIPTION - FREQUENCY
FREQUENCY: CONTINUOUS

## 2023-05-25 NOTE — FLOWSHEET NOTE
Pre HD note   05/25/23 0800   Treatment   Time On 0800   Treatment Goal 2500   Observations & Evaluations   Level of Consciousness 0   Oriented X 4   Heart Rhythm Regular   Respiratory Quality/Effort Unlabored   O2 Device None (Room air)   Edema Facial   Edema Generalized None   LUE Edema None   RLE Edema None   LLE Edema None   Facial Edema +1   Vital Signs   /75   Temp 98.5 °F (36.9 °C)   Pulse 98   Respirations 16   Pain Assessment   Pain Assessment 0-10   Pain Level 4   Pain Location Leg   Pain Orientation Left   Technical Checks   Dialysis Machine No. [id number]   RO Machine Number [id number]   Dialyzer Lot No. [id number]   Tubing Lot Number [id number]   All Connections Secure Yes   NS Bag Yes   Saline Line Double Clamped Yes   Dialyzer Revaclear 300   Prime Volume (mL) 300 mL   ICEBOAT I;C;E;B;O;A;T   RO Machine Log Sheet Completed Yes   Machine Alarm Self Test Passed; Completed   Air Foam Detector Tested;Proper Function   Extracorporeal Circuit Tested for Integrity Yes   Machine Conductivity 13.4   Manual Conductivity 13.4   Machine Ph 7.2   Bleach Test (Neg) Yes   Bath Temperature 96.8 °F (36 °C)   Treatment Initiation   Dialyze Hours 3.5   Treatment  Initiation Universal Precautions maintained;Lines secured to patient; Connections secured;Prime given;Venous Parameters set; Air foam detector engaged; Arterial Parameters set; Hemosafe Device; Saline line double clamped; Hemo-Safe Applied;Dialyzer; Revaclear Dialyzer;REV-300   During Hemodialysis Assessment   ABP (Arterial line BP) 124/78   Blood Flow Rate (ml/min) 400 ml/min   Arterial Pressure (mmHg) -110 mmHg   Venous Pressure (mmHg) 180   TMP 40      Comments TX. initated, no issues noted. pt. stable. .   Access Visible Yes   Ultrafiltration Rate (ml/hr) 830 ml/hr   Ultrafiltration Removed (mL) 0 ml/min   Hemodialysis Fistula/Graft Arteriovenous vein graft Superior Arm   No placement date or time found.    Present on Admission/Arrival: Yes  Access Type: Arteriovenous

## 2023-05-25 NOTE — FLOWSHEET NOTE
Post HD note   05/25/23 1130   Treatment   Time Off 1130   Treatment Goal 2000   Observations & Evaluations   Level of Consciousness 0   Oriented X 4   Heart Rhythm Regular   Respiratory Quality/Effort Unlabored   Edema Facial   Facial Edema +1   Vital Signs   /79   Pulse (!) 101   Pain Assessment   Pain Assessment None - Denies Pain   Post-Hemodialysis Assessment   Post-Treatment Procedures Blood returned; Access bleeding time < 10 minutes   Machine Disinfection Process Exterior Machine Disinfection   Rinseback Volume (ml) 200 ml   Blood Volume Processed (Liters) 78.9 l/min   Dialyzer Clearance Clear   Duration of Treatment (minutes) 210 minutes   Heparin Amount Administered During Treatment (mL) 0 mL   Hemodialysis Intake (ml) 500 ml   Hemodialysis Output (ml) 2500 ml   NET Removed (ml) 2000   Tolerated Treatment Fair     RN completed patient assessment. RN reviewed technicians vital signs and procedure note. Tx completed.  Reviewed by JOJO Valadez

## 2023-05-26 ENCOUNTER — ANESTHESIA (OUTPATIENT)
Facility: HOSPITAL | Age: 48
DRG: 270 | End: 2023-05-26
Payer: MEDICARE

## 2023-05-26 ENCOUNTER — APPOINTMENT (OUTPATIENT)
Facility: HOSPITAL | Age: 48
DRG: 270 | End: 2023-05-26
Payer: MEDICARE

## 2023-05-26 ENCOUNTER — ANESTHESIA EVENT (OUTPATIENT)
Facility: HOSPITAL | Age: 48
DRG: 270 | End: 2023-05-26
Payer: MEDICARE

## 2023-05-26 PROCEDURE — 6360000002 HC RX W HCPCS: Performed by: NURSE PRACTITIONER

## 2023-05-26 PROCEDURE — 6370000000 HC RX 637 (ALT 250 FOR IP): Performed by: SURGERY

## 2023-05-26 PROCEDURE — C1725 CATH, TRANSLUMIN NON-LASER: HCPCS | Performed by: SURGERY

## 2023-05-26 PROCEDURE — 3600000013 HC SURGERY LEVEL 3 ADDTL 15MIN: Performed by: SURGERY

## 2023-05-26 PROCEDURE — C1894 INTRO/SHEATH, NON-LASER: HCPCS | Performed by: SURGERY

## 2023-05-26 PROCEDURE — 97530 THERAPEUTIC ACTIVITIES: CPT

## 2023-05-26 PROCEDURE — 2060000000 HC ICU INTERMEDIATE R&B

## 2023-05-26 PROCEDURE — C1769 GUIDE WIRE: HCPCS | Performed by: SURGERY

## 2023-05-26 PROCEDURE — 97535 SELF CARE MNGMENT TRAINING: CPT

## 2023-05-26 PROCEDURE — 2500000003 HC RX 250 WO HCPCS: Performed by: SURGERY

## 2023-05-26 PROCEDURE — 76000 FLUOROSCOPY <1 HR PHYS/QHP: CPT

## 2023-05-26 PROCEDURE — 6370000000 HC RX 637 (ALT 250 FOR IP): Performed by: NURSE PRACTITIONER

## 2023-05-26 PROCEDURE — 6360000004 HC RX CONTRAST MEDICATION: Performed by: SURGERY

## 2023-05-26 PROCEDURE — 2720000010 HC SURG SUPPLY STERILE: Performed by: SURGERY

## 2023-05-26 PROCEDURE — 3600000003 HC SURGERY LEVEL 3 BASE: Performed by: SURGERY

## 2023-05-26 PROCEDURE — 2580000003 HC RX 258: Performed by: SURGERY

## 2023-05-26 PROCEDURE — 2709999900 HC NON-CHARGEABLE SUPPLY: Performed by: SURGERY

## 2023-05-26 PROCEDURE — 73060 X-RAY EXAM OF HUMERUS: CPT

## 2023-05-26 PROCEDURE — B31J1ZZ FLUOROSCOPY OF LEFT UPPER EXTREMITY ARTERIES USING LOW OSMOLAR CONTRAST: ICD-10-PCS | Performed by: SURGERY

## 2023-05-26 PROCEDURE — 6360000002 HC RX W HCPCS: Performed by: STUDENT IN AN ORGANIZED HEALTH CARE EDUCATION/TRAINING PROGRAM

## 2023-05-26 PROCEDURE — 03743ZZ DILATION OF LEFT SUBCLAVIAN ARTERY, PERCUTANEOUS APPROACH: ICD-10-PCS | Performed by: SURGERY

## 2023-05-26 PROCEDURE — 36415 COLL VENOUS BLD VENIPUNCTURE: CPT

## 2023-05-26 RX ORDER — HYDROMORPHONE HYDROCHLORIDE 1 MG/ML
1 INJECTION, SOLUTION INTRAMUSCULAR; INTRAVENOUS; SUBCUTANEOUS EVERY 8 HOURS
Status: DISCONTINUED | OUTPATIENT
Start: 2023-05-26 | End: 2023-05-26

## 2023-05-26 RX ORDER — HYDROMORPHONE HYDROCHLORIDE 1 MG/ML
0.5 INJECTION, SOLUTION INTRAMUSCULAR; INTRAVENOUS; SUBCUTANEOUS EVERY 5 MIN PRN
Status: CANCELLED | OUTPATIENT
Start: 2023-05-26

## 2023-05-26 RX ORDER — ONDANSETRON 2 MG/ML
4 INJECTION INTRAMUSCULAR; INTRAVENOUS
Status: CANCELLED | OUTPATIENT
Start: 2023-05-26 | End: 2023-05-27

## 2023-05-26 RX ORDER — SODIUM CHLORIDE 9 MG/ML
INJECTION, SOLUTION INTRAVENOUS PRN
Status: CANCELLED | OUTPATIENT
Start: 2023-05-26

## 2023-05-26 RX ORDER — SODIUM CHLORIDE, SODIUM LACTATE, POTASSIUM CHLORIDE, CALCIUM CHLORIDE 600; 310; 30; 20 MG/100ML; MG/100ML; MG/100ML; MG/100ML
INJECTION, SOLUTION INTRAVENOUS CONTINUOUS
Status: DISCONTINUED | OUTPATIENT
Start: 2023-05-26 | End: 2023-05-26

## 2023-05-26 RX ORDER — SODIUM CHLORIDE 0.9 % (FLUSH) 0.9 %
5-40 SYRINGE (ML) INJECTION PRN
Status: CANCELLED | OUTPATIENT
Start: 2023-05-26

## 2023-05-26 RX ORDER — FENTANYL CITRATE 50 UG/ML
25 INJECTION, SOLUTION INTRAMUSCULAR; INTRAVENOUS EVERY 5 MIN PRN
Status: CANCELLED | OUTPATIENT
Start: 2023-05-26

## 2023-05-26 RX ORDER — LIDOCAINE HYDROCHLORIDE 10 MG/ML
1 INJECTION, SOLUTION EPIDURAL; INFILTRATION; INTRACAUDAL; PERINEURAL
Status: DISCONTINUED | OUTPATIENT
Start: 2023-05-26 | End: 2023-05-26 | Stop reason: HOSPADM

## 2023-05-26 RX ORDER — LIDOCAINE HYDROCHLORIDE 10 MG/ML
INJECTION, SOLUTION INFILTRATION; PERINEURAL PRN
Status: DISCONTINUED | OUTPATIENT
Start: 2023-05-26 | End: 2023-05-26 | Stop reason: ALTCHOICE

## 2023-05-26 RX ORDER — ONDANSETRON 2 MG/ML
4 INJECTION INTRAMUSCULAR; INTRAVENOUS AS NEEDED
Status: DISCONTINUED | OUTPATIENT
Start: 2023-05-26 | End: 2023-05-26 | Stop reason: HOSPADM

## 2023-05-26 RX ORDER — HYDROMORPHONE HYDROCHLORIDE 1 MG/ML
1 INJECTION, SOLUTION INTRAMUSCULAR; INTRAVENOUS; SUBCUTANEOUS EVERY 8 HOURS PRN
Status: DISCONTINUED | OUTPATIENT
Start: 2023-05-26 | End: 2023-05-27 | Stop reason: HOSPADM

## 2023-05-26 RX ORDER — SODIUM CHLORIDE 9 MG/ML
INJECTION, SOLUTION INTRAVENOUS PRN
Status: DISCONTINUED | OUTPATIENT
Start: 2023-05-26 | End: 2023-05-26 | Stop reason: HOSPADM

## 2023-05-26 RX ORDER — SODIUM CHLORIDE 0.9 % (FLUSH) 0.9 %
5-40 SYRINGE (ML) INJECTION PRN
Status: DISCONTINUED | OUTPATIENT
Start: 2023-05-26 | End: 2023-05-26 | Stop reason: HOSPADM

## 2023-05-26 RX ORDER — FENTANYL CITRATE 50 UG/ML
100 INJECTION, SOLUTION INTRAMUSCULAR; INTRAVENOUS
Status: DISCONTINUED | OUTPATIENT
Start: 2023-05-26 | End: 2023-05-26 | Stop reason: HOSPADM

## 2023-05-26 RX ORDER — HYDRALAZINE HYDROCHLORIDE 20 MG/ML
10 INJECTION INTRAMUSCULAR; INTRAVENOUS
Status: CANCELLED | OUTPATIENT
Start: 2023-05-26

## 2023-05-26 RX ORDER — MIDAZOLAM HYDROCHLORIDE 2 MG/2ML
2 INJECTION, SOLUTION INTRAMUSCULAR; INTRAVENOUS
Status: DISCONTINUED | OUTPATIENT
Start: 2023-05-26 | End: 2023-05-26 | Stop reason: HOSPADM

## 2023-05-26 RX ORDER — ACETAMINOPHEN 500 MG
1000 TABLET ORAL ONCE
Status: DISCONTINUED | OUTPATIENT
Start: 2023-05-26 | End: 2023-05-26 | Stop reason: HOSPADM

## 2023-05-26 RX ORDER — OXYCODONE HYDROCHLORIDE 5 MG/1
5 TABLET ORAL
Status: CANCELLED | OUTPATIENT
Start: 2023-05-26 | End: 2023-05-27

## 2023-05-26 RX ORDER — PROCHLORPERAZINE EDISYLATE 5 MG/ML
5 INJECTION INTRAMUSCULAR; INTRAVENOUS
Status: CANCELLED | OUTPATIENT
Start: 2023-05-26 | End: 2023-05-27

## 2023-05-26 RX ORDER — SODIUM CHLORIDE 0.9 % (FLUSH) 0.9 %
5-40 SYRINGE (ML) INJECTION EVERY 12 HOURS SCHEDULED
Status: CANCELLED | OUTPATIENT
Start: 2023-05-26

## 2023-05-26 RX ORDER — SODIUM CHLORIDE 0.9 % (FLUSH) 0.9 %
5-40 SYRINGE (ML) INJECTION EVERY 12 HOURS SCHEDULED
Status: DISCONTINUED | OUTPATIENT
Start: 2023-05-26 | End: 2023-05-26 | Stop reason: HOSPADM

## 2023-05-26 RX ADMIN — CALCITRIOL CAPSULES 0.25 MCG 0.5 MCG: 0.25 CAPSULE ORAL at 09:13

## 2023-05-26 RX ADMIN — HYDROMORPHONE HYDROCHLORIDE 1 MG: 1 INJECTION, SOLUTION INTRAMUSCULAR; INTRAVENOUS; SUBCUTANEOUS at 11:01

## 2023-05-26 RX ADMIN — PANTOPRAZOLE SODIUM 40 MG: 40 TABLET, DELAYED RELEASE ORAL at 06:16

## 2023-05-26 RX ADMIN — SEVELAMER CARBONATE 1600 MG: 800 TABLET, FILM COATED ORAL at 17:15

## 2023-05-26 RX ADMIN — HYDROMORPHONE HYDROCHLORIDE 1 MG: 1 INJECTION, SOLUTION INTRAMUSCULAR; INTRAVENOUS; SUBCUTANEOUS at 21:02

## 2023-05-26 RX ADMIN — HYDROMORPHONE HYDROCHLORIDE 2 MG: 2 TABLET ORAL at 06:25

## 2023-05-26 RX ADMIN — Medication 1 AMPULE: at 21:46

## 2023-05-26 RX ADMIN — SEVELAMER CARBONATE 1600 MG: 800 TABLET, FILM COATED ORAL at 13:40

## 2023-05-26 RX ADMIN — METOPROLOL SUCCINATE 12.5 MG: 25 TABLET, EXTENDED RELEASE ORAL at 09:13

## 2023-05-26 RX ADMIN — MIDODRINE HYDROCHLORIDE 5 MG: 5 TABLET ORAL at 18:14

## 2023-05-26 RX ADMIN — Medication 1 AMPULE: at 09:11

## 2023-05-26 RX ADMIN — HYDROMORPHONE HYDROCHLORIDE 2 MG: 2 TABLET ORAL at 13:40

## 2023-05-26 RX ADMIN — ACETAMINOPHEN 1000 MG: 500 TABLET ORAL at 06:16

## 2023-05-26 RX ADMIN — HYDROMORPHONE HYDROCHLORIDE 2 MG: 2 TABLET ORAL at 18:47

## 2023-05-26 RX ADMIN — PANTOPRAZOLE SODIUM 40 MG: 40 TABLET, DELAYED RELEASE ORAL at 17:14

## 2023-05-26 RX ADMIN — ACETAMINOPHEN 1000 MG: 500 TABLET ORAL at 21:45

## 2023-05-26 RX ADMIN — DOCUSATE SODIUM - SENNOSIDES 2 TABLET: 50; 8.6 TABLET, FILM COATED ORAL at 21:46

## 2023-05-26 RX ADMIN — DOCUSATE SODIUM - SENNOSIDES 2 TABLET: 50; 8.6 TABLET, FILM COATED ORAL at 09:13

## 2023-05-26 RX ADMIN — SERTRALINE 50 MG: 50 TABLET, FILM COATED ORAL at 09:13

## 2023-05-26 RX ADMIN — SEVELAMER CARBONATE 1600 MG: 800 TABLET, FILM COATED ORAL at 06:16

## 2023-05-26 RX ADMIN — ATORVASTATIN CALCIUM 20 MG: 20 TABLET, FILM COATED ORAL at 09:13

## 2023-05-26 RX ADMIN — SODIUM CHLORIDE, PRESERVATIVE FREE 10 ML: 5 INJECTION INTRAVENOUS at 06:17

## 2023-05-26 ASSESSMENT — PAIN DESCRIPTION - ONSET
ONSET: ON-GOING

## 2023-05-26 ASSESSMENT — PAIN DESCRIPTION - DESCRIPTORS
DESCRIPTORS: ACHING

## 2023-05-26 ASSESSMENT — PAIN DESCRIPTION - LOCATION
LOCATION: LEG

## 2023-05-26 ASSESSMENT — PAIN DESCRIPTION - FREQUENCY
FREQUENCY: INTERMITTENT
FREQUENCY: CONTINUOUS

## 2023-05-26 ASSESSMENT — PAIN DESCRIPTION - ORIENTATION
ORIENTATION: LEFT

## 2023-05-26 ASSESSMENT — PAIN SCALES - GENERAL
PAINLEVEL_OUTOF10: 7
PAINLEVEL_OUTOF10: 4
PAINLEVEL_OUTOF10: 7
PAINLEVEL_OUTOF10: 7
PAINLEVEL_OUTOF10: 6
PAINLEVEL_OUTOF10: 0
PAINLEVEL_OUTOF10: 7
PAINLEVEL_OUTOF10: 4
PAINLEVEL_OUTOF10: 7
PAINLEVEL_OUTOF10: 6
PAINLEVEL_OUTOF10: 4
PAINLEVEL_OUTOF10: 0
PAINLEVEL_OUTOF10: 4
PAINLEVEL_OUTOF10: 7
PAINLEVEL_OUTOF10: 4

## 2023-05-26 ASSESSMENT — PAIN DESCRIPTION - PAIN TYPE
TYPE: ACUTE PAIN
TYPE: SURGICAL PAIN

## 2023-05-26 ASSESSMENT — PAIN - FUNCTIONAL ASSESSMENT
PAIN_FUNCTIONAL_ASSESSMENT: ACTIVITIES ARE NOT PREVENTED

## 2023-05-26 NOTE — ANESTHESIA PRE PROCEDURE
[address]    Drug/Infectious Status (If Applicable):  Lab Results   Component Value Date/Time    HEPCAB NONREACTIVE 01/07/2022 04:24 PM    HEPCAB NONREACTIVE 01/07/2022 04:24 PM       COVID-19 Screening (If Applicable):   Lab Results   Component Value Date/Time    COVID19 Detected 01/04/2022 08:07 AM    COVID19 Please find results under separate order 03/27/2021 06:52 AM    COVID19 Not detected 03/27/2021 06:52 AM           Anesthesia Evaluation  Patient summary reviewed and Nursing notes reviewed no history of anesthetic complications:   Airway: Mallampati: III  TM distance: >3 FB   Neck ROM: full  Mouth opening: > = 3 FB   Dental: normal exam   (+) caps      Pulmonary:Negative Pulmonary ROS and normal exam  breath sounds clear to auscultation                             Cardiovascular:Negative CV ROS  Exercise tolerance: good (>4 METS),   (+) hypertension:, CAD:, hyperlipidemia        Rhythm: regular  Rate: normal                 ROS comment: LV: Estimated LVEF is 35 - 40%. Normal cavity size. Mildly increased wall thickness. Moderately reduced systolic function. · MV: Mitral valve leaflet calcification. Mild mitral annular calcification. 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 · TV: Moderate tricuspid valve regurgitation is present. · PA: Moderate pulmonary hypertension. Neuro/Psych:   Negative Neuro/Psych ROS              GI/Hepatic/Renal: Neg GI/Hepatic/Renal ROS  (+) renal disease: ESRD,           Endo/Other: Negative Endo/Other ROS                    Abdominal: normal exam            Vascular: negative vascular ROS. Other Findings:           Anesthesia Plan      general and MAC     ASA 3       Induction: intravenous. MIPS: Postoperative opioids intended and Prophylactic antiemetics administered. Anesthetic plan and risks discussed with patient. Use of blood products discussed with patient whom consented to blood products.    Plan

## 2023-05-26 NOTE — CARE COORDINATION
Transition of Care Plan:     RUR: 23% High risk  Prior Level of Functioning: Home with sister  Andres HD: T, TH, Sat   Disposition:IPR: Pending referrals [facility] and [facility]   If SNF or IPR: Date FOC offered: 5/26  Date FOC received: 5/26  Accepting facility: pending   Date authorization started with reference number: N/A  Date authorization received and expires: N/A  Follow up appointments: PCP  DME needed: TBD  Transportation at discharge: BLS   IM/IMM Medicare/ letter given: to be provided  Is patient a [employer] and connected with [facility]? N/A              If yes, was [place] transfer form completed and [facility] notified? N/A  Caregiver Contact:Ashley Bergeron (Other)   [phone]   Discharge Caregiver contacted prior to discharge? If Pt desires   Care Conference needed? N/A  Barriers to discharge:  Medical stability, Hep B antigen panel needed        3:50pm: CM received a call from [facility]. They are able to accept Pt. CM was informed that they will have a bed available tomorrow, however Pt will need to receive HD prior to discharge. [facility] is requesting the HEP B Antigen Panel be drawn prior to discharge    CM notified attending. 2:30pm: CM met with Pt to discuss discharge recommendation for IPR. Pt was agreeable and requested that a referral be sent to [facility] (pref) and [facility] (back-up). CM sent referrals as requested.          Irving Zarate, JIE Mcgowan

## 2023-05-26 NOTE — BRIEF OP NOTE
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 PTA w/ 10x6 w/ good result. OK to use AVG. Arm swelling should improve over next few days.       Electronically signed by Martina Mojica MD on 5/26/2023 at 12:39 PM

## 2023-05-27 VITALS
BODY MASS INDEX: 20.38 KG/M2 | HEIGHT: 67 IN | DIASTOLIC BLOOD PRESSURE: 78 MMHG | OXYGEN SATURATION: 96 % | SYSTOLIC BLOOD PRESSURE: 93 MMHG | TEMPERATURE: 98.2 F | HEART RATE: 94 BPM | WEIGHT: 129.85 LBS | RESPIRATION RATE: 16 BRPM

## 2023-05-27 LAB
ALBUMIN SERPL-MCNC: 2.4 G/DL (ref 3.5–5)
ANION GAP SERPL CALC-SCNC: 5 MMOL/L (ref 5–15)
BUN SERPL-MCNC: 29 MG/DL (ref 6–20)
BUN/CREAT SERPL: 5 (ref 12–20)
CALCIUM SERPL-MCNC: 7.7 MG/DL (ref 8.5–10.1)
CHLORIDE SERPL-SCNC: 94 MMOL/L (ref 97–108)
CO2 SERPL-SCNC: 31 MMOL/L (ref 21–32)
CREAT SERPL-MCNC: 5.52 MG/DL (ref 0.7–1.3)
ERYTHROCYTE [DISTWIDTH] IN BLOOD BY AUTOMATED COUNT: 14.4 % (ref 11.5–14.5)
GLUCOSE SERPL-MCNC: 102 MG/DL (ref 65–100)
HCT VFR BLD AUTO: 20.3 % (ref 36.6–50.3)
HGB BLD-MCNC: 6.7 G/DL (ref 12.1–17)
MCH RBC QN AUTO: 31.5 PG (ref 26–34)
MCHC RBC AUTO-ENTMCNC: 33 G/DL (ref 30–36.5)
MCV RBC AUTO: 95.3 FL (ref 80–99)
NRBC # BLD: 0 K/UL (ref 0–0.01)
NRBC BLD-RTO: 0 PER 100 WBC
PHOSPHATE SERPL-MCNC: 2.8 MG/DL (ref 2.6–4.7)
PLATELET # BLD AUTO: 240 K/UL (ref 150–400)
PMV BLD AUTO: 11 FL (ref 8.9–12.9)
POTASSIUM SERPL-SCNC: 4.4 MMOL/L (ref 3.5–5.1)
RBC # BLD AUTO: 2.13 M/UL (ref 4.1–5.7)
SODIUM SERPL-SCNC: 130 MMOL/L (ref 136–145)
WBC # BLD AUTO: 13.2 K/UL (ref 4.1–11.1)

## 2023-05-27 PROCEDURE — 80069 RENAL FUNCTION PANEL: CPT

## 2023-05-27 PROCEDURE — 6370000000 HC RX 637 (ALT 250 FOR IP): Performed by: SURGERY

## 2023-05-27 PROCEDURE — 85027 COMPLETE CBC AUTOMATED: CPT

## 2023-05-27 PROCEDURE — 36415 COLL VENOUS BLD VENIPUNCTURE: CPT

## 2023-05-27 PROCEDURE — 6360000002 HC RX W HCPCS: Performed by: NURSE PRACTITIONER

## 2023-05-27 PROCEDURE — 90935 HEMODIALYSIS ONE EVALUATION: CPT

## 2023-05-27 RX ORDER — SENNA AND DOCUSATE SODIUM 50; 8.6 MG/1; MG/1
2 TABLET, FILM COATED ORAL 2 TIMES DAILY
Qty: 120 TABLET | Refills: 0 | Status: SHIPPED | OUTPATIENT
Start: 2023-05-27 | End: 2023-06-26

## 2023-05-27 RX ORDER — MIDODRINE HYDROCHLORIDE 5 MG/1
5 TABLET ORAL 3 TIMES DAILY PRN
Qty: 90 TABLET | Refills: 3 | Status: SHIPPED | OUTPATIENT
Start: 2023-05-27

## 2023-05-27 RX ORDER — DIPHENHYDRAMINE HYDROCHLORIDE, ZINC ACETATE 2; .1 G/100G; G/100G
CREAM TOPICAL EVERY 6 HOURS PRN
Status: DISCONTINUED | OUTPATIENT
Start: 2023-05-27 | End: 2023-05-27 | Stop reason: HOSPADM

## 2023-05-27 RX ORDER — DIPHENHYDRAMINE HYDROCHLORIDE 50 MG/ML
25 INJECTION INTRAMUSCULAR; INTRAVENOUS ONCE
Status: COMPLETED | OUTPATIENT
Start: 2023-05-27 | End: 2023-05-27

## 2023-05-27 RX ORDER — DIPHENHYDRAMINE HCL 25 MG
25 CAPSULE ORAL EVERY 6 HOURS PRN
Status: CANCELLED | OUTPATIENT
Start: 2023-05-27

## 2023-05-27 RX ORDER — METOPROLOL SUCCINATE 25 MG/1
12.5 TABLET, EXTENDED RELEASE ORAL DAILY
Qty: 30 TABLET | Refills: 3 | Status: SHIPPED | OUTPATIENT
Start: 2023-05-28

## 2023-05-27 RX ADMIN — HYDROMORPHONE HYDROCHLORIDE 2 MG: 2 TABLET ORAL at 00:40

## 2023-05-27 RX ADMIN — HYDROMORPHONE HYDROCHLORIDE 2 MG: 2 TABLET ORAL at 12:39

## 2023-05-27 RX ADMIN — ATORVASTATIN CALCIUM 20 MG: 20 TABLET, FILM COATED ORAL at 11:38

## 2023-05-27 RX ADMIN — CALCITRIOL CAPSULES 0.25 MCG 0.5 MCG: 0.25 CAPSULE ORAL at 11:38

## 2023-05-27 RX ADMIN — SERTRALINE 50 MG: 50 TABLET, FILM COATED ORAL at 11:37

## 2023-05-27 RX ADMIN — DIPHENHYDRAMINE HYDROCHLORIDE 50 MG: 25 CAPSULE ORAL at 02:07

## 2023-05-27 RX ADMIN — DOCUSATE SODIUM - SENNOSIDES 2 TABLET: 50; 8.6 TABLET, FILM COATED ORAL at 11:37

## 2023-05-27 RX ADMIN — ACETAMINOPHEN 1000 MG: 500 TABLET ORAL at 06:06

## 2023-05-27 RX ADMIN — DIPHENHYDRAMINE HYDROCHLORIDE 25 MG: 50 INJECTION, SOLUTION INTRAMUSCULAR; INTRAVENOUS at 04:37

## 2023-05-27 RX ADMIN — SEVELAMER CARBONATE 1600 MG: 800 TABLET, FILM COATED ORAL at 11:38

## 2023-05-27 RX ADMIN — ASPIRIN 81 MG: 81 TABLET, COATED ORAL at 11:37

## 2023-05-27 RX ADMIN — MIDODRINE HYDROCHLORIDE 5 MG: 5 TABLET ORAL at 12:39

## 2023-05-27 ASSESSMENT — PAIN DESCRIPTION - DESCRIPTORS
DESCRIPTORS: ITCHING;THROBBING
DESCRIPTORS: ACHING;THROBBING

## 2023-05-27 ASSESSMENT — PAIN DESCRIPTION - PAIN TYPE
TYPE: SURGICAL PAIN
TYPE: SURGICAL PAIN

## 2023-05-27 ASSESSMENT — PAIN DESCRIPTION - FREQUENCY
FREQUENCY: CONTINUOUS
FREQUENCY: CONTINUOUS

## 2023-05-27 ASSESSMENT — PAIN DESCRIPTION - ORIENTATION
ORIENTATION: LEFT

## 2023-05-27 ASSESSMENT — PAIN SCALES - GENERAL
PAINLEVEL_OUTOF10: 4
PAINLEVEL_OUTOF10: 0
PAINLEVEL_OUTOF10: 4
PAINLEVEL_OUTOF10: 6

## 2023-05-27 ASSESSMENT — PAIN DESCRIPTION - LOCATION
LOCATION: LEG

## 2023-05-27 NOTE — FLOWSHEET NOTE
05/27/23 0650   Vital Signs   BP (!) 140/72   Temp 97.9 °F (36.6 °C)   Pulse 82   Respirations 18   Pain Assessment   Pain Assessment None - Denies Pain   Pain Level 0   Treatment   Time On 0700   Treatment Goal 2500   Observations & Evaluations   Level of Consciousness 0   Oriented X 4   Heart Rhythm Regular   Respiratory Quality/Effort Unlabored   Bilateral Breath Sounds Clear   Skin Condition/Temp Dry; Warm   Abdomen Inspection Soft   RLE Edema +1   LLE Edema +1   Technical Checks   Dialysis Machine No. [id number]   RO Machine Number [id number]   Dialyzer Lot No. [id number]   Tubing Lot Number [id number]   All Connections Secure Yes   NS Bag Yes   Saline Line Double Clamped Yes   Dialyzer Revaclear 300   Prime Volume (mL) 200 mL   ICEBOAT I;C;E;B;O;A;T   RO Machine Log Sheet Completed Yes   Machine Alarm Self Test Completed   Air Foam Detector Tested;Proper Function;pH Reading   Extracorporeal Circuit Tested for Integrity Yes   Machine Conductivity 14   Manual Conductivity 14   Manual Ph 7.4   Bleach Test (Neg) Yes   Bath Temperature 98.6 °F (37 °C)   Treatment Initiation   Dialyze Hours 3.5   Treatment  Initiation Universal Precautions maintained;Lines secured to patient; Connections secured;Prime given;Venous Parameters set; Arterial Parameters set; Air foam detector engaged; Saline line double clamped   Dialysis Bath   K+ (Potassium) 2   Ca+ (Calcium) 2.5   Na+ (Sodium) 138   HCO3 (Bicarb) 40   Bicarbonate Concentrate Lot No. [id number]   Acid Concentrate Lot No. [id number]   Hemodialysis Fistula/Graft Arteriovenous vein graft Superior Arm   No placement date or time found.    Present on Admission/Arrival: Yes  Access Type: Arteriovenous vein graft  Orientation: Superior  Access Location: Arm   Site Assessment Clean, dry & intact   Thrill Present   Bruit Present   Venous Needle Size 15 G   Arterial Needle Size 15 G   Accessed By: tom   Access Attempts  1     Primary RN SBAR: Lauren Lambert  Patient Education:

## 2023-05-27 NOTE — PROGRESS NOTES
0800 - Report received. Patient off floor at dialysis. Will assess, obtain vitals, and assume care upon return to floor. 1430 - Patient requesting IV benadryl. Messaged Dr. Calin Doyle. Patient has PO benadryl ordered. 1930 - Bedside shift change report given to Surjit (oncoming nurse) by Daniel Dos Santos (offgoing nurse). Report included the following information Nurse Handoff Report, Index, Adult Overview, Intake/Output, MAR, Recent Results, Med Rec Status, and Cardiac Rhythm NSR to sinus tachy .
1500: Discharge to Rehab     Pt 42 yo male admitted for pain and swelling in LLE. PMHx HTN, CAD, AICD placement, ESRD HD Tu/Th/Sat, and PAD. Pt medically stable for discharge to rehab. Personal belongings collected by nursing staff. Report called to [facility] RN at [facility]. Pt transport on stretcher by medical transportation to rehab facility.
1930 - Bedside shift change report given to JOJO Galindo (oncoming nurse) by Fabi Ross (offgoing nurse). Report included the following information Nurse Handoff Report, Index, Adult Overview, Surgery Report, Intake/Output, MAR, Recent Results, Med Rec Status, and Cardiac Rhythm NSR to sinus tachy .
2114: Patient refused to take PO dilaudid, said it didn't help with his pain. IV dilaudid was given. 3725: Patient IV is occluded. IV dilaudid was wasted and PO dilaudid was given with 0700 medications. 3498: Nurse unable to get a new IV in patient. Patient requested to have PICC team attempt to place the IV instead of another nurse. Nurse left a message for PICC team. Patient wants the dressing to be changed at a later time. End of Shift Note    Bedside shift change report given to Viky Newberry RN (oncoming nurse) by Samara Diehl RN (offgoing nurse). Report included the following information SBAR, Kardex, and MAR    Shift worked:  [id number]     Shift summary and any significant changes:          Concerns for physician to address:      Zone phone for oncoming shift:          Activity:     Number times ambulated in hallways past shift: 0  Number of times OOB to chair past shift: 1    Cardiac:   Cardiac Monitoring: Yes           Access:  Current line(s): PIV     Genitourinary:   Urinary status: anuric    Respiratory:      Chronic home O2 use?: NO  Incentive spirometer at bedside: NO       GI:     Current diet:  ADULT DIET; Regular; Low Potassium (Less than 3000 mg/day);  Low Phosphorus (Less than 1000 mg)  ADULT ORAL NUTRITION SUPPLEMENT; Breakfast, Lunch, Dinner; Renal Oral Supplement  Passing flatus: YES  Tolerating current diet: YES       Pain Management:   Patient states pain is manageable on current regimen: YES    Skin:     Interventions: PT/OT consult and limit briefs    Patient Safety:  Fall Score:    Interventions: bed/chair alarm and pt to call before getting OOB       Length of Stay:  Expected LOS: 2  Actual LOS: 17      RYAN JAIN RN
32337 LEONEL Ahn [place] team for new IV access. CHG bath given and gown changed. 1849 - IV started by PICC team this AM occluded. Unable to flush. Called ER for u/s guided IV.
Bedside shift change report given to [facility], RN (oncoming nurse) by Donny Miranda RN (offgoing nurse). Report included the following information Nurse Handoff Report, Cardiac Rhythm NSR, and Quality Measures. 1152--Notified Dr. Josephina Soulier about patient's Hgb of 6.6.
Bedside shift change [facility], RN (oncoming nurse) by Tonya Haq RN (offgoing nurse). Report included the following information Nurse Handoff Report, Recent Results, Cardiac Rhythm NSR, and Quality Measures. 0750--Patient off of floor for dialysis. Will complete shift assessment upon return. End of Shift Note    Bedside shift change report given to Chelsey Arguello RN (oncoming nurse) by Divine Diop RN (offgoing nurse). Report included the following information SBAR, Kardex, Recent Results, Cardiac Rhythm NSR with BBB, and Quality Measures    Shift worked:  day     Shift summary and any significant changes:     Patient had dialysis today. Oxycodone given x 2. PCA pump still going. Patient complaining of breakthrough pain and also having some tremors/shaking, so Dr. Harjit Alvarado ordered Baclofen. Mineral oil enema, stool softener, and Glycolax given for constipation. Patient has been unable to have a bowel movement. Dr. Harjit Alvarado ordered a soap suds enema for night shift. Concerns for physician to address:       Zone phone for oncoming shift:          Activity:     Number times ambulated in hallways past shift: 0  Number of times OOB to chair past shift: 0    Cardiac:   Cardiac Monitoring: Yes           Access:  Current line(s): PIV     Genitourinary:   Urinary status: anuric    Respiratory:      Chronic home O2 use?: NO  Incentive spirometer at bedside: NO       GI:     Current diet:  ADULT DIET; Regular; Low Potassium (Less than 3000 mg/day);  Low Phosphorus (Less than 1000 mg)  ADULT ORAL NUTRITION SUPPLEMENT; Breakfast, Lunch, Dinner; Renal Oral Supplement  Passing flatus: YES  Tolerating current diet: YES       Pain Management:   Patient states pain is manageable on current regimen: YES    Skin:     Interventions: increase time out of bed, limit briefs, and nutritional support    Patient Safety:  Fall Score:    Interventions: bed/chair alarm, gripper socks, and pt to call before getting OOB       Length
Comprehensive Nutrition Assessment    Type and Reason for Visit:  Reassess    Nutrition Recommendations/Plan:   Continue current diet and supplements     Malnutrition Assessment:  Malnutrition Status:  No malnutrition (05/19/23 1222)      Nutrition Assessment:    Chart reviewed; patient off the floor at time of attempted visit. S/p left BKA. Has been receiving a low potassium/low phos diet. Potassium slightly elevated. PO intake improving. Patient receiving Nepro shakes TID per his request to help with PO intake. Continue current diet and ONS as ordered. Encourage intake of meals as tolerated. Patient Vitals for the past 120 hrs:   PO Meals Eaten (%)   05/24/23 1524 51 - 75%   05/24/23 1315 51 - 75%   05/24/23 0830 1 - 25%   05/23/23 1251 1 - 25%   05/23/23 0900 0%   05/21/23 1313 0%     Nutrition Related Findings:    Na 127, K+ 5.2, phos 3.1, BG -34-97-85   BM 5/25   3+ edema LUE   Atorvastatin, Calcitriol, Protonix, Miralax, Senokot, Zoloft, Renvela   Wound Type: Surgical Incision       Current Nutrition Intake & Therapies:    Average Meal Intake: 51-75%  Average Supplements Intake: %  ADULT DIET; Regular; Low Potassium (Less than 3000 mg/day); Low Phosphorus (Less than 1000 mg)  ADULT ORAL NUTRITION SUPPLEMENT; Breakfast, Lunch, Dinner; Renal Oral Supplement    Anthropometric Measures:  Height: 170.2 cm (5' 7\")  Ideal Body Weight (IBW): 148 lbs (67 kg)       Current Body Weight: 129 lb 13.6 oz (58.9 kg),   IBW. Current BMI (kg/m2): 20.3                          BMI Categories: Normal Weight (BMI 18.5-24. 9)    Estimated Daily Nutrient Needs:  Energy Requirements Based On: Formula  Weight Used for Energy Requirements: Current  Energy (kcal/day): 1863 kcals (BMR x 1. 3AF)  Weight Used for Protein Requirements: Current  Protein (g/day): 77-88g (1.3-1.5 g/kg bw)  Method Used for Fluid Requirements: 1 ml/kcal  Fluid (ml/day): 1800 mL or per MD    Nutrition Diagnosis:   Altered nutrition-related lab
End of Shift Note    Bedside shift change report given to *** (oncoming nurse) by Keara Frazier RN (offgoing nurse).   Report included the following information SBAR, Kardex, ED Summary, Intake/Output, MAR, and Recent Results    Shift worked:  Night     Shift summary and any significant changes:     ***     Concerns for physician to address:  ***     Zone phone for oncoming shift:            Keara Frazier RN
Hospitalist Progress Note    NAME:   Neymar Diallo   : 1975   MRN: [id number]     Date/Time: 2023 12:38 PM  Patient PCP: Richardson Connors MD    Estimated discharge date:48 hrs pending vascular clearance    Hospital course:  Neymar Diallo is a 41 y.o. male with a PMH of hypertension, CAD s/p MI (x2 stents to LAD) s/p AICD placement in 2009, ESRD s/p 2 failed kidney transplants, severe pulmonary hypertension, and peripheral vascular disease s/p left femoral to anterior tibial artery bypass with cadaveric vein on 2022 who presents with nontraumatic pain and swelling of left lower leg. Came in for  acute pain x 1 day described as 10/10constant sharp and stabbing throughout left lower leg. Patient notified vascular surgery who got him an appointment the next day but advised to go to the hospital if it was unbearable. HD TTHS, dialyzed for revision. Plan for thrombectomy today. Hypotensive with Dialysis, Albumin ordered. Assessment / Plan:  PVD  Left leg pain and swelling  LLE JENNIFER:  Left femoral-anterior tibial arterial bypass graft is occluded. Incidental finding of non-vascular fluid collection at the left proximal graft anastomosis measuring 2.2 x 0.7 cm  - S/P left femoral to anterior tibial artery bypass with cadaveric vein on 2022   - CTA of left leg scheduled for   - Vascular surgery consulted - appreciate recommendation  S/p thrombectomy done with a new graft added to the lateral segment  Patient stated that his pain is still the same almost at the same level when he admitted  Duplex US of lower art by pass graft of L leg showed the left femoral-anterior tibial PTFE arterial bypass graft is occluded. S/p thrombectomy and revision of L leg femoral to tibial bypass, L leg angiogram on . Unfortunately not getting flow to the foot. Pt is scheduled for bka/aka today  Pain control with PCA dilaudid, monitor for sedation, dose incr today due to uncontrolled pain.   Eliquis on
Hospitalist Progress Note    NAME:   Serena Stewart   : 1975   MRN: [id number]     Date/Time: 2023 9:30 AM  Patient PCP: Yasmin Quintero MD    Estimated discharge date:48 hrs pending vascular clearance    Hospital course:  Serena Stewart is a 41 y.o. male with a PMH of hypertension, CAD s/p MI (x2 stents to LAD) s/p AICD placement in 2009, ESRD s/p 2 failed kidney transplants, severe pulmonary hypertension, and peripheral vascular disease s/p left femoral to anterior tibial artery bypass with cadaveric vein on 2022 who presents with nontraumatic pain and swelling of left lower leg. Came in for  acute pain x 1 day described as 10/10constant sharp and stabbing throughout left lower leg. Patient notified vascular surgery who got him an appointment the next day but advised to go to the hospital if it was unbearable. HD TTHS, dialyzed for revision. S/p L leg thrombectomy followed by L BKA    Assessment / Plan:  PVD  Ischemic L foot s/p BKA  LLE JENNIFER:  Left femoral-anterior tibial arterial bypass graft is occluded. Incidental finding of non-vascular fluid collection at the left proximal graft anastomosis measuring 2.2 x 0.7 cm  - S/P left femoral to anterior tibial artery bypass with cadaveric vein on 2022   -S/p thrombectomy done with a new graft added to the lateral segment  Duplex US of lower art by pass graft of L leg showed the left femoral-anterior tibial PTFE arterial bypass graft is occluded. S/p thrombectomy and revision of L leg femoral to tibial bypass, L leg angiogram on . Unfortunately not getting flow to the foot despite the above procedure so he underwent L BKA on .    Cont' current pain regimen  Resume Eliquis when clear by vascular team    ESRD on HD  Hyperkalemia, resolved with HD  - S/P renal transplants x 2  - on HD TThSat  - Follows with [address] [place] [address] nephrology  - continue Cacitriol, Cinacalcet  - avoid nephrotoxins  -prn benadryl for prutitus  -appreciate nephrology's
Hospitalist Progress Note    NAME:   Zach Byrd   : 1975   MRN: [id number]     Date/Time: 2023 3:48 PM  Patient PCP: Alba Mason MD    Estimated discharge date:48 hrs pending vascular clearance, pain control, fistulogram  tomorrow      Hospital course:  Zach Byrd is a 41 y.o. male with a PMH of hypertension, CAD s/p MI (x2 stents to LAD) s/p AICD placement in 2009, ESRD s/p 2 failed kidney transplants, severe pulmonary hypertension, and peripheral vascular disease s/p left femoral to anterior tibial artery bypass with cadaveric vein on 2022 who presents with nontraumatic pain and swelling of left lower leg. Came in for  acute pain x 1 day described as 10/10constant sharp and stabbing throughout left lower leg. Patient notified vascular surgery who got him an appointment the next day but advised to go to the hospital if it was unbearable. HD TTHS, dialyzed for revision. S/p L leg thrombectomy followed by L BKA    Assessment / Plan:  PVD  Ischemic L foot s/p BKA  LLE JENNIFER:  Left femoral-anterior tibial arterial bypass graft is occluded. Incidental finding of non-vascular fluid collection at the left proximal graft anastomosis measuring 2.2 x 0.7 cm  - S/P left femoral to anterior tibial artery bypass with cadaveric vein on 2022   -S/p thrombectomy done with a new graft added to the lateral segment  Duplex US of lower art by pass graft of L leg showed the left femoral-anterior tibial PTFE arterial bypass graft is occluded. S/p thrombectomy and revision of L leg femoral to tibial bypass, L leg angiogram on . Unfortunately not getting flow to the foot despite the above procedure so he underwent L BKA on .    Stop anticoagulation      LUE swelling  fistulogram tomorrow  N.p.o. after midnight    ESRD on HD  Hyperkalemia, resolved with HD  - S/P renal transplants x 2  - on HD TThSat  - Follows with [facility] nephrology  - continue Cacitriol, Cinacalcet  - avoid nephrotoxins  -prn
I have discussed with the patient the rationale for blood component transfusion; its benefits in treating or preventing fatigue, organ damage, or death; and its risk which includes mild transfusion reactions, rare risk of blood borne infection, or more serious but rare reactions. I have discussed the alternatives to transfusion, including the risk and consequences of not receiving transfusion. The patient had an opportunity to ask questions and had agreed to proceed with transfusion of blood components.
[employer] Wound Debridement and Cleansing System  Ref: Bharati Poisson: [id number] LOT: [id number] Expiration Date: 03/31/2025
Nephrology Progress Note  [facility] / [facility] [address]  [place], [address]  Phone - [phone]  Fax - [phone]                 Patient: Javi Cortez                     YOB: 1975        Date- 5/27/2023                                     Admit Date: 5/8/2023   CC: Follow up for  esrd  IMPRESSION & PLAN:   ESRD  -home hd- f/b [facility] nephrologist  Hyperkalemia  hypocalcemia  Anemia of ckd  Sec. Hyperpara  hyponatremia  Left leg pain- swelling  S/P thrombectomy and revision of left leg bypass on 5-11-23  H/o left common femoral to anterior tibial artery bypass with cadaver vein (9/19/22). Hypertension  H/o gastric ca  H/o GI bleed  H/o renal transplant x 2 in past  H/o CAD- s/p stent placement  H/o HIT    PLAN-  SEEN ON HD TODAY  Continue dialysis every Tuesday Thursday Saturday  Continue calcitriol  HOLD Sensipar  Epogen for anemia     Subjective: Interval History:   Seen on hd     Objective:   Vitals:    05/27/23 0845 05/27/23 0900 05/27/23 0915 05/27/23 0930   BP: (!) 152/78 138/77 (!) 139/57 (!) 109/56   Pulse: 77 75 75 75   Resp:       Temp:       TempSrc:       SpO2:       Weight:       Height:          I/O last 3 completed shifts: In: 360 [P.O.:360]  Out: 0   No intake/output data recorded. Physical exam:    GEN:  nad  NECK:  Supple, no thyromegaly  RESP: clear b/l, no  wheezing,   CVS: s1,s2,rrr  NEURO: non focal, normal speech  EXT: left bka stump +  Left arm AV graft present      Chart reviewed. Pertinent Notes reviewed.      Data Review :  Lab Results   Component Value Date/Time     05/27/2023 07:12 AM    K 4.4 05/27/2023 07:12 AM    CL 94 05/27/2023 07:12 AM    CO2 31 05/27/2023 07:12 AM    BUN 29 05/27/2023 07:12 AM    CREATININE 5.52 05/27/2023 07:12 AM    GLUCOSE 102 05/27/2023 07:12 AM    CALCIUM 7.7 05/27/2023 07:12 AM       Lab Results   Component Value Date    WBC 13.2
Nephrology Progress Note  [facility] / [facility] [address], [address]  [place], [address]  Phone - [phone]  Fax - [phone]                 Patient: Eliot Mcclain                     YOB: 1975        Date- 5/23/2023                                     Admit Date: 5/8/2023   CC: Follow up for  ESRD  IMPRESSION & PLAN:   ESRD -home hd- f/b [facility] nephrologist  Hyperkalemia  hypocalcemia  Anemia of ckd  Sec. Hyperpara  hyponatremia  Left leg pain- swelling  S/P thrombectomy and revision of left leg bypass on 5-11-23  H/o left common femoral to anterior tibial artery bypass with cadaver vein (9/19/22). Hypertension  H/o gastric ca  H/o GI bleed  H/o renal transplant x 2 in past  H/o CAD- s/p stent placement  H/o HIT    PLAN-  Seen on hd today  K and na will improve with hd  Continue dialysis every Tuesday Thursday Saturday  Continue calcitriol  Epogen for anemia  Stop sensipar due to low ca  Increase ca bath with hd     Subjective: Interval History:   Seen on hd  K high , na low-- k 5.3--na 128--ca 6.6  Objective:   Vitals:    05/23/23 0845 05/23/23 0900 05/23/23 0915 05/23/23 0930   BP: 95/75 125/70 127/68 131/81   Pulse: 81 (!) 106 88 (!) 109   Resp:       Temp:       TempSrc:       SpO2:       Weight:       Height:          I/O last 3 completed shifts: In: 1962.6 [P.O.:720; I.V.:29.5; Blood:413.1; IV Piggyback:800]  Out: 500 [Blood:500]  No intake/output data recorded. Physical exam:    GEN:  NAD  NECK:  Supple, no thyromegaly  RESP: Clear  b/l, no  wheezing,   CVS: RRR,S1,S2   NEURO: non focal, normal speech  EXT: left bka stump +  Left arm AV graft present      Chart reviewed. Pertinent Notes reviewed.      Data Review :  Lab Results   Component Value Date/Time     05/23/2023 03:04 AM    K 5.3 05/23/2023 03:04 AM    CL 98 05/23/2023 03:04 AM    CO2 23 05/23/2023 03:04 AM    BUN 40 05/23/2023 03:04
Nephrology Progress Note  [facility] / [facility]  [address]  [address], [address]  Phone - [phone]  Fax - [phone]                 Patient: Radha Real                     YOB: 1975        Date- 5/25/2023                                     Admit Date: 5/8/2023   CC: Follow up for  esrd  IMPRESSION & PLAN:   ESRD  -home hd- f/b [facility] nephrologist  Hyperkalemia  hypocalcemia  Anemia of ckd  Sec. Hyperpara  hyponatremia  Left leg pain- swelling  S/P thrombectomy and revision of left leg bypass on 5-11-23  H/o left common femoral to anterior tibial artery bypass with cadaver vein (9/19/22). Hypertension  H/o gastric ca  H/o GI bleed  H/o renal transplant x 2 in past  H/o CAD- s/p stent placement  H/o HIT    PLAN-  SEEN ON HD TODAY  Continue dialysis every Tuesday Thursday Saturday  Continue calcitriol  HOLD Sensipar  Epogen for anemia     Subjective: Interval History:   Seen on hd  K high 5.2--na low 127  Objective:   Vitals:    05/25/23 0316 05/25/23 0800 05/25/23 0815 05/25/23 0830   BP: 128/89 124/75 118/77 126/79   Pulse: 94 98 96 97   Resp: 14 16 16 16   Temp: 98.2 °F (36.8 °C) 98.5 °F (36.9 °C)     TempSrc: Oral      SpO2: 93%      Weight:       Height:          I/O last 3 completed shifts: In: 978.3 [P.O.:960; I.V.:18.3]  Out: -   No intake/output data recorded. Physical exam:    GEN:  nad  NECK:  Supple, no thyromegaly  RESP: clear b/l, no  wheezing,   CVS: s1,s2,rrr  NEURO: non focal, normal speech  EXT: left bka stump +  Left arm AV graft present      Chart reviewed. Pertinent Notes reviewed.      Data Review :  Lab Results   Component Value Date/Time     05/25/2023 08:27 AM    K 5.2 05/25/2023 08:27 AM    CL 91 05/25/2023 08:27 AM    CO2 30 05/25/2023 08:27 AM    BUN 31 05/25/2023 08:27 AM    CREATININE 6.51 05/25/2023 08:27 AM    GLUCOSE 83 05/25/2023 08:27 AM    CALCIUM 7.6
Occupational Therapy Contact Note  05/25/23     Chart reviewed and spoke with RN, patient is currently MARIAM to HD and is unavailable to participate in therapy interventions at this time. Will follow up with patient to complete OT tx as able.      Thank you,  Ronald Rosas, HINA, OTR/L
PerfectServed the Dr; patient bp 97/34, hr 106, MEWs of 3- awaiting callback
Physical Therapy    Patient currently off the floor for procedure. Will defer and attempt later today as able.     Sophia Hernandez, PT, DPT
Physical Therapy Contact Note  05/25/23     Chart reviewed and spoke with RN, patient is currently MARIAM to HD and is unavailable to participate in therapy interventions at this time. Will follow up with patient to complete PT tx as able.
Progress Note  Date:2023       Room:[address] [place] [address]  Patient Name:Claude Bonnie Haymaker     YOB: 1975     Age:47 y.o. Subjective       Mr. Karissa Vega is a 41 y.o. AAM with a pmhx significant for ESRF on home HD, CAD, HTN, HLD, systolic CHF s/p ICD placement, multiple DVTs currently w/ IVC filter (22), HIT, small bowel necrosis, GERD, and multiple gastric ulcers s/p TIPS (2022). He has not been on anticoagulation since his TIPS procedure. He is well known to the practice for the management of PAD and HD access. He currently has a LUE Aurora-Danny aVG (2020). He has chronic central venous syndrome requiring multiple fistulograms (last 22). He has a remote hx of LLE bypass graft () that occluded. He is admitted to the hospital with an occluded left common femoral to anterior tibial artery bypass with cadaver vein (22) after presenting with left leg critical limb ischemia. He is s/p thrombectomy of his left leg bypass (23). Despite a patent bypass he continued to have rest pain of the left foot. He returned to the OR on 23 for revascularization of the distal LLE without success. He is s/p left BKA 2023. During admission he developed LUE swelling. Patient in HD this am.      Objective         Vitals Last 24 Hours:  TEMPERATURE:  Temp  Av.6 °F (37 °C)  Min: 98.2 °F (36.8 °C)  Max: 99.1 °F (37.3 °C)  RESPIRATIONS RANGE: Resp  Avg: 15.6  Min: 14  Max: 16  PULSE OXIMETRY RANGE: SpO2  Av %  Min: 93 %  Max: 96 %  PULSE RANGE: Pulse  Av.1  Min: 94  Max: 106  BLOOD PRESSURE RANGE: Systolic (52DNL), VIRI:103 , Min:99 , QDT:523   ; Diastolic (43NXH), AQC:05, Min:63, Max:92    I/O (24Hr): Intake/Output Summary (Last 24 hours) at 2023 1036  Last data filed at 2023 1524  Gross per 24 hour   Intake 725.75 ml   Output --   Net 725.75 ml       Objective:  General Appearance:  Uncomfortable and in pain.     Vital signs: (most recent): Blood pressure 120/77,
Progress Note  Date:2023       Room:[address] [place] [address]  Patient Name:Claude Pecola Pinon     YOB: 1975     Age:47 y.o. Subjective       Mr. Padmini Gooden is a 41 y.o. AAM with a pmhx significant for ESRF on home HD, CAD, HTN, HLD, systolic CHF s/p ICD placement, multiple DVTs currently w/ IVC filter (22), HIT, small bowel necrosis, GERD, and multiple gastric ulcers s/p TIPS (2022). He has not been on anticoagulation since his TIPS procedure. He is well known to the practice for the management of PAD and HD access. He currently has a LUE Grethel-Danny aVG (2020). He has chronic central venous syndrome requiring multiple fistulograms (last 22). He has a remote hx of LLE bypass graft () that occluded. He is admitted to the hospital with an occluded left common femoral to anterior tibial artery bypass with cadaver vein (22) after presenting with left leg critical limb ischemic. He is s/p thrombectomy of his left leg bypass (23). Despite a patent bypass he continued to have rest pain of the left foot. He returned to the OR on 23 for revascularization of the distal LLE without success. He is s/p left BKA 2023. This am he was seen while in HD. His pain was controlled. He has significant edema of the LUE. Objective         Vitals Last 24 Hours:  TEMPERATURE:  Temp  Av.4 °F (36.9 °C)  Min: 97.8 °F (36.6 °C)  Max: 98.9 °F (37.2 °C)  RESPIRATIONS RANGE: Resp  Av.3  Min: 13  Max: 30  PULSE OXIMETRY RANGE: SpO2  Av %  Min: 85 %  Max: 100 %  PULSE RANGE: Pulse  Av.8  Min: 75  Max: 115  BLOOD PRESSURE RANGE: Systolic (16CJK), CEY:112 , Min:85 , YDZ:655   ; Diastolic (45JRN), FSY:57, Min:32, Max:102    I/O (24Hr): Intake/Output Summary (Last 24 hours) at 2023 1148  Last data filed at 2023 0403  Gross per 24 hour   Intake 1938.33 ml   Output 500 ml   Net 1438.33 ml       Objective:  General Appearance:  Uncomfortable and in pain.     Vital signs:
Progress Note  Date:2023       Room:[address] [place] [address]  Patient Name:Claude E Teofilo Becton     YOB: 1975     Age:47 y.o. Subjective       Mr. Grey De La Fuente is a 41 y.o. AAM with a pmhx significant for ESRF on home HD, CAD, HTN, HLD, systolic CHF s/p ICD placement, multiple DVTs currently w/ IVC filter (22), HIT, small bowel necrosis, GERD, and multiple gastric ulcers s/p TIPS (2022). He has not been on anticoagulation since his TIPS procedure. He is well known to the practice for the management of PAD and HD access. He currently has a LUE [place] aVG (2020). He has chronic central venous syndrome requiring multiple fistulograms (last 22). He has a remote hx of LLE bypass graft () that occluded. He is admitted to the hospital with an occluded left common femoral to anterior tibial artery bypass with cadaver vein (22) after presenting with left leg critical limb ischemia. He is s/p thrombectomy of his left leg bypass (23). Despite a patent bypass he continued to have rest pain of the left foot. He returned to the OR on 23 for revascularization of the distal LLE without success. He is s/p left BKA 2023. During admission he developed LUE swelling. Pain is controlled this am.  Dressing is dry and intact. Objective         Vitals Last 24 Hours:  TEMPERATURE:  Temp  Av.1 °F (37.3 °C)  Min: 98.4 °F (36.9 °C)  Max: 99.9 °F (37.7 °C)  RESPIRATIONS RANGE: Resp  Av.4  Min: 16  Max: 24  PULSE OXIMETRY RANGE: SpO2  Av.7 %  Min: 93 %  Max: 95 %  PULSE RANGE: Pulse  Av.9  Min: 86  Max: 116  BLOOD PRESSURE RANGE: Systolic (28IGY), TMJ:502 , Min:94 , RGU:708   ; Diastolic (88XTM), AHV:78, Min:47, Max:91    I/O (24Hr): Intake/Output Summary (Last 24 hours) at 2023 0913  Last data filed at 2023 8271  Gross per 24 hour   Intake 752.5 ml   Output 3000 ml   Net -2247.5 ml       Objective:  General Appearance:  Uncomfortable and in pain.     Vital
Received notification from bedside RN about patient with regards to: ca 6.6  VS: /54, HR 97, RR 16, O2 sat 98% on RA    Intervention given:   - AOT Albumin (2.8)  - corrected calcium 7.26, no additional intervention
Received notification from bedside RN about patient with regards to: requesting IV Benadryl as PO Benadryl does not provide relief for itching  VS: /89, HR 94, RR 14, O2 sat 93% on RA    Intervention given:   - will try Atarax for now, 25 mg PO x 1 dose
Shift report received from RN    Shift assessment complete, see flow sheet     2000: Patient returned via bed from the OR, rates pain 5/10, PCA restarted, sister at the bedside, left leg immobilizer in place, no drainage noted, 1 unit of PRBCs infusing, per PACU nurse Aiden Caban blood started at 1821    2258: PRN rajan given for pain     0409: CA 6.3, NP paged     Shift report given to RN
TRANSFER - IN REPORT:    Verbal report received from [facility], RN(name) on Ruel Beasley  being received from PCU(unit) for ordered procedure      Report consisted of patients Situation, Background, Assessment and   Recommendations(SBAR). Information from the following report(s) Index was reviewed with the receiving nurse. Opportunity for questions and clarification was provided.       Assessment completed upon patients arrival to unit and care assume
TRANSFER - OUT REPORT:    Verbal report given to Neil Cote RN on Jasbir Cea  being transferred to pre-op  for ordered procedure       Report consisted of patient's Situation, Background, Assessment and   Recommendations(SBAR). Information from the following report(s) Nurse Handoff Report, Recent Results, Cardiac Rhythm NSR, and Quality Measures was reviewed with the receiving nurse. [place] Assessment: No data recorded  Lines:   Peripheral IV 61/27/93 Right Cephalic (Active)   Site Assessment Clean, dry & intact 05/21/23 1445   Line Status Flushed; Infusing 05/21/23 1445   Line Care Connections checked and tightened 05/21/23 1445   Phlebitis Assessment No symptoms 05/21/23 1445   Infiltration Assessment 0 05/21/23 1445   Alcohol Cap Used Yes 05/21/23 1445   Dressing Status Clean, dry & intact 05/21/23 1445   Dressing Type Transparent 05/21/23 1445       Peripheral IV 05/22/23 Proximal;Right; Anterior Forearm (Active)        Opportunity for questions and clarification was provided. Patient transported with:  Monitor and Registered Nurse      1418--Per Neil Cote RN, surgery is now scheduled for 4 pm due to delay. 1455--PCA pump stopped per request of surgery team.     1504--Patient being wheeled off to pre-op. Neil Cote RN notified about needing blood transfusion. 1923--Patient still in PACU. Report given to JOJO Handley about what is known about patient. PACU has not called yet with report on patient's surgery.
Vascular    Looks great  LUE edema is due to recurrent central venous stenosis  He does not need to restart Eliquis  Will stop PCA tomorrow  PT/OT evaluations tomorrow  Will plan on Angiogram of AVG w/ PTA of SCV Thursday  Hopefully ready for IPR Friday
Vascular    No c/o  BKA healing nicely - no infection  OK for discharge to [facility] from my standpoint  Needs to follow up with me in 3-4 weeks  Staples should not be removed before he follows up with me
AM    CREATININE 4.85 05/24/2023 04:14 AM    GLUCOSE 94 05/24/2023 04:14 AM    CALCIUM 7.6 05/24/2023 04:14 AM       Lab Results   Component Value Date    WBC 14.0 (H) 05/24/2023    HGB 7.7 (L) 05/24/2023    HCT 25.4 (L) 05/24/2023    .2 (H) 05/24/2023     05/24/2023      Recent Labs     05/22/23  0957 05/23/23  0304 05/24/23  0414   * 128* 131*   K 4.9 5.3* 4.4   CL 93* 98 96*   CO2 31 23 28   GLUCOSE 85 97 94   BUN 31* 40* 18   CREATININE 6.51* 7.47* 4.85*   CALCIUM 6.6* 6.3* 7.6*         Recent Labs     05/22/23  0957 05/23/23  0304 05/24/23  0414   WBC 18.5* 13.5* 14.0*   RBC 2.05* 2.59* 2.51*   HGB 6.6* 8.2* 7.7*   HCT 20.1* 25.5* 25.4*   MCV 98.0 98.5 101.2*   MCH 32.2 31.7 30.7   MCHC 32.8 32.2 30.3   RDW 14.6* 17.1* 16.3*    167 196   MPV 11.0 10.9 10.8       No results found for: IRON, TIBC, LABIRON  No results found for: PTH  No results found for: LABA1C  No results found for: COLORU, CLARITYU, GLUCOSEU, BILIRUBINUR, KETUA, SPECGRAV, BLOODU, PHUR, PROTEINU, NITRU, LEUKOCYTESUR, LABMICR, Radha Self Results (most recent):  Medication list  reviewed  Current Facility-Administered Medications   Medication Dose Route Frequency    polyethylene glycol (GLYCOLAX) packet 17 g  17 g Oral Daily    metoprolol succinate (TOPROL XL) extended release tablet 12.5 mg  12.5 mg Oral Daily    midodrine (PROAMATINE) tablet 5 mg  5 mg Oral TID PRN    diphenhydrAMINE (BENADRYL) capsule 50 mg  50 mg Oral Q8H PRN    epoetin cherrie-epbx (RETACRIT) 14,000 Units combo injection  14,000 Units SubCUTAneous Once per day on Tue Thu Sat    0.9 % sodium chloride infusion   IntraVENous Continuous    HYDROmorphone (DILAUDID) 1 mg/mL PCA   IntraVENous Continuous    acetaminophen (TYLENOL) tablet 1,000 mg  1,000 mg Oral 3 times per day    oxyCODONE (ROXICODONE) immediate release tablet 5 mg  5 mg Oral Q4H PRN    alcohol 62% (NOZIN) nasal  1 ampule  1 ampule Topical Q12H    sodium chloride flush 0.9 %
with [facility] nephrology  - continue Cacitriol, Cinacalcet  - avoid nephrotoxins  -prn benadryl for prutitus  -appreciate nephrology's recs       HF eEF (s/p AICD 2009, 2019)  Echo EF(11/21): 35-40%  Hypertension  SVT, nonsustained, likely pain induced. - Continue on Metoprolol with holding parameters  - Hydralazine as needed for SBP > 170  -Eliquis on hold for now     CAD  - S/P stents  - continue atorvastatin     Recurrent DVT LLE (2909,9106, 2012)  - s/p IVC 2011, removal in 2014 and replacement 1/22     Anemia of chronic disease  S/p 1 unit prbc, hgb stable at 8.2  Follow up with cbc in the AM, transfuse for hgb<7    Thrombocytopenia, resolved  History of HIT  Has IVC filter (1/4/22)      Recurrent GI ulceration (s/p TIPS)  - continue Protonix         Medical Decision Making:   I personally reviewed labs  I personally reviewed imaging  Toxic drug monitoring: Clinical, monitor CBC  Discussed case with: RN, CM, vascular        Code Status: Full  DVT Prophylaxis:   GI Prophylaxis: Protonix    Subjective:     Chief Complaint / Reason for Physician Visit  Seen today has some twitching  Discussed with RN events overnight. Objective:     VITALS:   Last 24hrs VS reviewed since prior progress note.  Most recent are:  Patient Vitals for the past 24 hrs:   BP Temp Temp src Pulse Resp SpO2   05/26/23 1330 -- -- -- -- 16 --   05/26/23 1315 114/79 98.9 °F (37.2 °C) Oral 90 18 --   05/26/23 1300 120/80 -- -- 88 -- 98 %   05/26/23 1250 120/82 -- -- 88 -- 97 %   05/26/23 1245 122/81 -- -- 88 20 98 %   05/26/23 1240 137/86 -- -- 90 20 99 %   05/26/23 1237 128/85 98.5 °F (36.9 °C) Oral 87 19 97 %   05/26/23 1221 123/77 -- -- -- 18 --   05/26/23 1216 116/79 -- -- -- 18 --   05/26/23 1211 121/83 -- -- -- 18 --   05/26/23 1206 120/83 -- -- -- 18 --   05/26/23 1201 119/81 -- -- -- 18 --   05/26/23 1145 125/82 -- -- -- 18 --   05/26/23 1044 119/80 98.4 °F (36.9 °C) Oral 85 18 97 %   05/26/23 0900 134/85 -- -- 86 -- 93 %   05/26/23
capsule 0.5 mcg  0.5 mcg Oral Daily    cinacalcet (SENSIPAR) tablet 60 mg  60 mg Oral Daily    dicyclomine (BENTYL) tablet 20 mg  20 mg Oral Q8H PRN    oxyCODONE (ROXICODONE) immediate release tablet 10 mg  10 mg Oral Q4H PRN    pantoprazole (PROTONIX) tablet 40 mg  40 mg Oral BID AC    sertraline (ZOLOFT) tablet 50 mg  50 mg Oral Daily    sevelamer (RENVELA) tablet 1,600 mg  1,600 mg Oral TID    sennosides-docusate sodium (SENOKOT-S) 8.6-50 MG tablet 2 tablet  2 tablet Oral BID        Pardeep Hernández MD  5/22/2023
128* 128* 131*   K 4.9 5.3* 4.4   CL 93* 98 96*   CO2 31 23 28   BUN 31* 40* 18         Signed:  Marcela Grier MD

## 2023-05-27 NOTE — DISCHARGE SUMMARY
Hospitalist Discharge Summary     Patient ID:  Haidee Schilder  [id number]  75 y.o.  1975 5/8/2023    PCP on record: Hussain Galindo MD    Admit date: 5/8/2023  Discharge date and time: 5/27/2023    DISCHARGE DIAGNOSIS:  PVD  Ischemic L foot s/p BKA  LLE JENNIFER  ESRD on HD  Hyperkalemia, resolved with HD  LUE swelling  HF eEF (s/p AICD 2009, 2019)  Echo EF(11/21): 35-40%  Hypertension  SVT, nonsustained  CAD  Recurrent DVT  Thrombocytopenia, resolved  History of HIT  Has IVC filter (1/4/22)    CONSULTATIONS:  IP CONSULT TO VASCULAR SURGERY  IP CONSULT TO NEPHROLOGY    Excerpted HPI from H&P of Jamie Brown DO:  Haidee Schilder is a 41 y.o. male with a PMH of hypertension, CAD s/p MI (x2 stents to LAD) s/p AICD placement in 6/2009, ESRD s/p 2 failed kidney transplants, severe pulmonary hypertension, and peripheral vascular disease s/p left femoral to anterior tibial artery bypass with cadaveric vein on 9/2022 who presents with nontraumatic pain and swelling of left lower leg. Patient's pain started early yesterday afternoon \"out of nowhere\". Pain is 10/10 constant sharp and stabbing throughout left lower leg. Patient called vascular surgery who got him an appointment the next day but advised to go to the hospital if it was unbearable. There is no medication or position that seems to help this pain. Patient denies any fever, chills, abdominal pain, nausea, vomit, dyspnea, chest pain, palpitations, syncope, palpitations, tobacco, illicit drug, or alcohol use. Patient is a full code. At baseline patient is independent with all ADLS. In ED vitals stable. Initial labs significant for sodium of 130, creatinine of 11.1, and PLT of 121. Patient admitted for further management. Patient started on pain control. Vascular consulted. Will place on 14hr pre-medication protocol for CTA.     ______________________________________________________________________  DISCHARGE SUMMARY/HOSPITAL COURSE:  for

## 2023-05-27 NOTE — CARE COORDINATION
UPDATE: 2:14PM    CM arranged transport with [facility] today at 3P. Janeth AlemanMARITA [facility]  [phone]        UPDATE: 12:29PM    JIE spoke with vascular surgeon, via telephone regarding pts d/c. Vascular surgeon agreeable to pts d/c on today to [facility]. Hospitalist is able to enter d/c order. CM spoke with Salome Robbins from [facility], to inform her of pt being medically stable to d/c.  CM informed that the following information for placement is:    Accepting Physician: Dr. Cody Richey  Call Report: [phone] or [phone]    Following information provided to pts nurse. Pts nurse informed that transport will need to be arranged. CM completed the need of the pt at this time. Janeth Aleman MARTIA Sonijhony [facility]  [phone]    INITIAL NOTE: JIE: Janeth Aleman is currently working with pt. CM received a call from Salome Robbins at [facility] ([phone]), regarding insurance [employer] has been approved. Salome Robbins reported that pt is able to transition to facility today if medically stable.       JIE will follow up with clinical team to determine pts d/c.    MARITA Winslow CM  [phone]

## 2023-05-27 NOTE — FLOWSHEET NOTE
05/27/23 1030   Vital Signs   BP (!) 151/60   Temp 97.6 °F (36.4 °C)   Pulse 69   Respirations 20   Pain Assessment   Pain Assessment 0-10   Post-Hemodialysis Assessment   Post-Treatment Procedures Blood returned; Access bleeding time < 10 minutes   Machine Disinfection Process Exterior Machine Disinfection   Rinseback Volume (ml) 300 ml   Blood Volume Processed (Liters) 79.5 l/min   Dialyzer Clearance Lightly streaked   Duration of Treatment (minutes) 210 minutes   Hemodialysis Intake (ml) 500 ml   Hemodialysis Output (ml) 3000 ml   NET Removed (ml) 2500   Tolerated Treatment Good   Patient Response to Treatment well   Bilateral Breath Sounds Clear   RLE Edema +1   LLE Edema +1   Physician Notified No   Time Off 1030   Patient Disposition Return to room     Primary RN SBAR: Klaus Villegas  Comments: No issues during tx, all blood returned at end. Pt returned back to room   JOJO Small completed patient assessment. RN reviewed LPN vital signs and procedure note. Tx completed.  Reviewed by RN

## 2023-05-27 NOTE — PLAN OF CARE
0507: NP notified about patient wanting IV benadryl, PO atarax ordered. 8912: Patient refused atarax and wanted benadryl. PO benadryl given. Patient given alcohol swabs per patient request.    End of Shift Note    Bedside shift change report given to Jerald Bruno RN (oncoming nurse) by Louisa Ramires RN (offgoing nurse). Report included the following information SBAR    Shift worked:  [id number]     Shift summary and any significant changes:          Concerns for physician to address:       Zone phone for oncoming shift:          Activity:     Number times ambulated in hallways past shift: 0  Number of times OOB to chair past shift: 2    Cardiac:   Cardiac Monitoring: Yes           Access:  Current line(s): PIV     Genitourinary:   Urinary status: anuric    Respiratory:      Chronic home O2 use?: NO  Incentive spirometer at bedside: NO       GI:     Current diet:  ADULT DIET; Regular; Low Potassium (Less than 3000 mg/day); Low Phosphorus (Less than 1000 mg)  ADULT ORAL NUTRITION SUPPLEMENT; Breakfast, Lunch, Dinner; Renal Oral Supplement  Passing flatus: YES  Tolerating current diet: YES       Pain Management:   Patient states pain is manageable on current regimen: N/A    Skin:     Interventions: increase time out of bed and PT/OT consult    Patient Safety:  Fall Score:    Interventions: bed/chair alarm       Length of Stay:  Expected LOS: 2  Actual LOS: 16      RYAN JAIN RN                            Problem: Pain  Goal: Verbalizes/displays adequate comfort level or baseline comfort level  Outcome: Progressing     Problem: Safety - Adult  Goal: Free from fall injury  Outcome: Progressing     Problem: Skin/Tissue Integrity  Goal: Absence of new skin breakdown  Description: 1. Monitor for areas of redness and/or skin breakdown  2. Assess vascular access sites hourly  3. Every 4-6 hours minimum:  Change oxygen saturation probe site  4.   Every 4-6 hours:  If on nasal continuous positive airway pressure, respiratory
Problem: Occupational Therapy - Adult  Goal: By Discharge: Performs self-care activities at highest level of function for planned discharge setting. See evaluation for individualized goals. Description: FUNCTIONAL STATUS PRIOR TO ADMISSION:  The patient was independent for ADLs and functional mobility at baseline. HOME SUPPORT: Patient lived his sister who works from home. Occupational Therapy Goals:  Initiated 5/24/2023  1. Patient will perform grooming with Modified [place] within 7 day(s). 2.  Patient will perform lower body dressing with Set-up and Supervision within 7 day(s). 3.  Patient will perform toilet transfers with Supervision  within 7 day(s). 4.  Patient will perform all aspects of toileting with Set-up and Supervision within 7 day(s). 5.  Patient will participate in upper extremity therapeutic exercise/activities with [place] for 5 minutes within 7 day(s). 6.  Patient will utilize energy conservation techniques during functional activities with verbal cues within 7 day(s). Outcome: Progressing   OCCUPATIONAL THERAPY EVALUATION    Patient: Joshua Ybarra (89 y.o. male)  Date: 5/24/2023  Primary Diagnosis: Left leg pain [M79.605]  ESRD on dialysis ([facility] [address] [N18.6, Z99.2]  Acute pain of lower extremity, left [M79.605]  Critical limb ischemia of left lower extremity (HCC) [I70.222]  Procedure(s) (LRB):  LEFT LEG AMPUTATION BELOW KNEE (Left) 2 Days Post-Op     Precautions:      ASSESSMENT :  The patient is limited by decreased activity tolerance, LLE pain, and impaired functional mobility and balance following admission for L BKA (POD 2). At baseline pt lives with his sister and is independent with ADLs and functional mobility. He was received supine in bed, agreeable to participate. He donned R sock with setup in supported long sit with increased time, required rest break after task.  Pt transferred supine>sit with CGA and demo'd fair-good sitting balance EOB, performed light
Problem: Occupational Therapy - Adult  Goal: By Discharge: Performs self-care activities at highest level of function for planned discharge setting. See evaluation for individualized goals. Description: FUNCTIONAL STATUS PRIOR TO ADMISSION:  The patient was independent for ADLs and functional mobility at baseline. HOME SUPPORT: Patient lived his sister who works from home. Occupational Therapy Goals:  Initiated 5/24/2023  1. Patient will perform grooming with Modified [place] within 7 day(s). 2.  Patient will perform lower body dressing with Set-up and Supervision within 7 day(s). 3.  Patient will perform toilet transfers with Supervision  within 7 day(s). 4.  Patient will perform all aspects of toileting with Set-up and Supervision within 7 day(s). 5.  Patient will participate in upper extremity therapeutic exercise/activities with [place] for 5 minutes within 7 day(s). 6.  Patient will utilize energy conservation techniques during functional activities with verbal cues within 7 day(s). Outcome: Progressing     OCCUPATIONAL THERAPY TREATMENT  Patient: Donaldo Barney (27 y.o. male)  Date: 5/26/2023  Primary Diagnosis: Left leg pain [M79.605]  ESRD on dialysis ([address] [N18.6, Z99.2]  Acute pain of lower extremity, left [M79.605]  Critical limb ischemia of left lower extremity ([address] [I70.222]  Procedure(s) (LRB):  ANGIOGRAM OF LEFT ARM DIALYSIS GRAFT WITH CENTRAL VENUOUS ANGIOPLASTY (Left) Day of Surgery   Precautions:                  Chart, occupational therapy assessment, plan of care, and goals were reviewed. ASSESSMENT  Patient continues to benefit from skilled OT services and is progressing towards goals. Pt demonstrates improvement with functional mobility, requiring only CGA-min assist x1 for functional transfers today. He tolerated standing balance activities, able to stand with only unilateral UE support while simulating ADLs without LOB.  He remains motivated to participate and
Problem: Pain  Goal: Verbalizes/displays adequate comfort level or baseline comfort level  5/24/2023 0149 by Cari Barboza RN  Outcome: Progressing  5/23/2023 2102 by Franny Crooks RN  Outcome: Not Progressing
Problem: Pain  Goal: Verbalizes/displays adequate comfort level or baseline comfort level  5/25/2023 2032 by Maryellen Robles RN  Outcome: Progressing  5/25/2023 1321 by Edna Luz RN  Outcome: Progressing     Problem: Safety - Adult  Goal: Free from fall injury  5/25/2023 2032 by Maryellen Robles RN  Outcome: Progressing  5/25/2023 1321 by Edna Luz RN  Outcome: Progressing     Problem: Skin/Tissue Integrity  Goal: Absence of new skin breakdown  Description: 1. Monitor for areas of redness and/or skin breakdown  2. Assess vascular access sites hourly  3. Every 4-6 hours minimum:  Change oxygen saturation probe site  4. Every 4-6 hours:  If on nasal continuous positive airway pressure, respiratory therapy assess nares and determine need for appliance change or resting period.   5/25/2023 2032 by Maryellen Robles RN  Outcome: Progressing  5/25/2023 1321 by Edna Luz RN  Outcome: Progressing     Problem: ABCDS Injury Assessment  Goal: Absence of physical injury  5/25/2023 1321 by Edna Luz RN  Outcome: Progressing
Problem: Pain  Goal: Verbalizes/displays adequate comfort level or baseline comfort level  Outcome: Not Progressing     Problem: Safety - Adult  Goal: Free from fall injury  Outcome: Progressing     Problem: Skin/Tissue Integrity  Goal: Absence of new skin breakdown  Description: 1. Monitor for areas of redness and/or skin breakdown  2. Assess vascular access sites hourly  3. Every 4-6 hours minimum:  Change oxygen saturation probe site  4. Every 4-6 hours:  If on nasal continuous positive airway pressure, respiratory therapy assess nares and determine need for appliance change or resting period.   Outcome: Progressing     Problem: Pain  Goal: Verbalizes/displays adequate comfort level or baseline comfort level  Outcome: Not Progressing
Problem: Pain  Goal: Verbalizes/displays adequate comfort level or baseline comfort level  Outcome: Not Progressing     Problem: Safety - Adult  Goal: Free from fall injury  Outcome: Progressing     Problem: Skin/Tissue Integrity  Goal: Absence of new skin breakdown  Description: 1. Monitor for areas of redness and/or skin breakdown  2. Assess vascular access sites hourly  3. Every 4-6 hours minimum:  Change oxygen saturation probe site  4. Every 4-6 hours:  If on nasal continuous positive airway pressure, respiratory therapy assess nares and determine need for appliance change or resting period.   Outcome: Progressing     Problem: Pain  Goal: Verbalizes/displays adequate comfort level or baseline comfort level  Outcome: Not Progressing
Problem: Pain  Goal: Verbalizes/displays adequate comfort level or baseline comfort level  Outcome: Progressing     Problem: Safety - Adult  Goal: Free from fall injury  5/27/2023 1341 by lCari Vasquez RN  Outcome: Progressing  5/27/2023 0250 by Girard Schlatter, RN  Outcome: Progressing     Problem: Skin/Tissue Integrity  Goal: Absence of new skin breakdown  Description: 1. Monitor for areas of redness and/or skin breakdown  2. Assess vascular access sites hourly  3. Every 4-6 hours minimum:  Change oxygen saturation probe site  4. Every 4-6 hours:  If on nasal continuous positive airway pressure, respiratory therapy assess nares and determine need for appliance change or resting period.   Outcome: Progressing     Problem: ABCDS Injury Assessment  Goal: Absence of physical injury  Outcome: Progressing
Problem: Pain  Goal: Verbalizes/displays adequate comfort level or baseline comfort level  Outcome: Progressing     Problem: Safety - Adult  Goal: Free from fall injury  Outcome: Progressing
Problem: Pain  Goal: Verbalizes/displays adequate comfort level or baseline comfort level  Outcome: Progressing     Problem: Skin/Tissue Integrity  Goal: Absence of new skin breakdown  Description: 1. Monitor for areas of redness and/or skin breakdown  2. Assess vascular access sites hourly  3. Every 4-6 hours minimum:  Change oxygen saturation probe site  4. Every 4-6 hours:  If on nasal continuous positive airway pressure, respiratory therapy assess nares and determine need for appliance change or resting period.   Outcome: Progressing
Problem: Physical Therapy - Adult  Goal: By Discharge: Performs mobility at highest level of function for planned discharge setting. See evaluation for individualized goals. Description: FUNCTIONAL STATUS PRIOR TO ADMISSION: Patient was independent and active without use of DME.    HOME SUPPORT PRIOR TO ADMISSION: The patient lived with sister. Physical Therapy Goals  Initiated 5/24/2023  1. Patient will move from supine to sit and sit to supine, scoot up and down, and roll side to side in bed with supervision/set-up within 7 day(s). 2.  Patient will perform sit to stand with supervision/set-up within 7 day(s). 3.  Patient will transfer from bed to chair and chair to bed with supervision/set-up using the least restrictive device within 7 day(s). 4.  Patient will ambulate with supervision/set-up for 25 feet with the least restrictive device within 7 day(s). Outcome: Progressing   PHYSICAL THERAPY EVALUATION    Patient: Santana Sullivan (4 y.o. male)  Date: 5/24/2023  Primary Diagnosis: Left leg pain [M79.605]  ESRD on dialysis ([facility] [address] [N18.6, Z99.2]  Acute pain of lower extremity, left [M79.605]  Critical limb ischemia of left lower extremity (HCC) [I70.222]  Procedure(s) (LRB):  LEFT LEG AMPUTATION BELOW KNEE (Left) 2 Days Post-Op   Precautions:                      ASSESSMENT :   DEFICITS/IMPAIRMENTS:   The patient is limited by decreased functional mobility, strength, activity tolerance, endurance, balance, increased pain levels. Pt POD 2 following L BKA, motivated for participation with therapy and tolerating activity well. Pt with strong posterior lean during sit>>stand transfer, requiring minAx2 as well as facilitation of forward weight shift. Pt able to utilize hop-step gait pattern with RW support and minAx2 in order to reach bedside chair. VSS throughout.      Based on the impairments listed above, pt is functioning below his baseline independent level and would benefit from additional skilled
Problem: Safety - Adult  Goal: Free from fall injury  Outcome: Progressing     0645 To Dialysis via bed    0730 Bedside and Verbal shift change report given to Joseline Sanchez (oncoming nurse) by Hank Favre (offgoing nurse). Report included the following information Nurse Handoff Report, Adult Overview, Intake/Output, MAR, Med Rec Status, and Cardiac Rhythm NSRw/BBB .
reach      COMMUNICATION/EDUCATION:   The patient's plan of care was discussed with: registered nurse    Patient Education  Education Given To: Patient  Education Provided: Equipment;Home Exercise Program  Education Provided Comments: quad sets and SAQ; education on how to don/doff knee immobilizer  Education Method: Verbal;Demonstration  Barriers to Learning: None  Education Outcome: Verbalized understanding;Demonstrated understanding      Lianet Ventura PT  Minutes: 20

## 2023-05-30 ENCOUNTER — HOSPITAL ENCOUNTER (OUTPATIENT)
Facility: HOSPITAL | Age: 48
Setting detail: SPECIMEN
Discharge: HOME | End: 2023-06-02

## 2023-05-30 LAB — HISTORY CHECK: NORMAL

## 2023-05-30 PROCEDURE — 86920 COMPATIBILITY TEST SPIN: CPT

## 2023-05-30 PROCEDURE — 86901 BLOOD TYPING SEROLOGIC RH(D): CPT

## 2023-05-30 PROCEDURE — 86644 CMV ANTIBODY: CPT

## 2023-05-30 PROCEDURE — 86850 RBC ANTIBODY SCREEN: CPT

## 2023-05-30 PROCEDURE — 36415 COLL VENOUS BLD VENIPUNCTURE: CPT

## 2023-05-30 PROCEDURE — 86900 BLOOD TYPING SEROLOGIC ABO: CPT

## 2023-05-30 PROCEDURE — P9040 RBC LEUKOREDUCED IRRADIATED: HCPCS

## 2023-05-30 NOTE — OP NOTE
[address]  OPERATIVE REPORT    Name:  Eli Torres  MR#:  [id number]  :  1975  ACCOUNT #:  [de-identified]  DATE OF SERVICE:  2023    PREOPERATIVE DIAGNOSIS:  Left arm swelling due to central venous stenosis. POSTOPERATIVE DIAGNOSIS:  Left arm swelling due to central venous stenosis. PROCEDURE PERFORMED:  1. Angiogram of left arm dialysis graft and central veins. 2.  Angioplasty of left innominate and subclavian veins. SURGEON:  Court Stanley MD    ASSISTANT:  None. ANESTHESIA:  Local.    COMPLICATIONS:  None. SPECIMENS REMOVED:  None. IMPLANTS:  None. ESTIMATED BLOOD LOSS:  Minimal.    DRAINS:  None. INDICATIONS:  The patient is a 28-year-old male with end-stage renal disease who has a left upper arm dialysis graft and a history of recurrent central venous stenosis requiring angioplasty approximately every 3 months. He now has recurrent significant left arm swelling and presents for angiography with possible angioplasty. PROCEDURE:  After informed consent was obtained, the patient was given preoperative intravenous antibiotics. He was given Solu-Medrol and Benadryl preoperatively due to a history of contrast allergy. He was taken to the operating room and the left upper arm was prepped and draped as a sterile field. He has an upper arm loop graft, where the arterial side was lateral.  Under local anesthesia, the graft was easily cannulated at the apex of the loop in the distal upper arm and a 7-Thai sheath was placed in a downstream direction. Contrast was injected through the sheath to perform an angiogram of the venous side of the graft, draining veins, and central veins. The venous side of the graft and the anastomosis were normal.  Proximal brachial and axillary veins were normal.  There was severe stenosis in the distal subclavian vein and in the innominate vein.   The stenoses were crossed with a guidewire and then angioplastied

## 2023-05-30 NOTE — OP NOTE
[address]  OPERATIVE REPORT    Name:  Werner Hill  MR#:  [id number]  :  1975  ACCOUNT #:  [de-identified]  DATE OF SERVICE:  2023      PREOPERATIVE DIAGNOSIS:  Thrombosed left femoral to anterior tibial bypass with ischemic rest pain. POSTOPERATIVE DIAGNOSIS:  Thrombosed left femoral to anterior tibial bypass with ischemic rest pain. PROCEDURE PERFORMED:  1. Open thrombectomy and revision of left femoral to anterior tibial bypass. 2.  Left lower extremity angiograms. SURGEON:  Brigida Thomas MD    ASSISTANT:  None. ANESTHESIA:  General.    COMPLICATIONS:  None. SPECIMENS REMOVED:  None. IMPLANTS:  6 mm ringed DISTAFLO graft. ESTIMATED BLOOD LOSS:  300 mL. DRAINS:  None. INDICATIONS:  The patient is a 65-year-old male with a long and complicated medical history including severe peripheral artery disease. He had a bypass from the left common femoral artery to the proximal anterior tibial artery with a GORE-MATT DISTAFLO graft approximately 12 years ago. He was on chronic anticoagulation for many years, but this was stopped relatively recently due to recurrent GI bleeding. 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 He now presents with a clotted left leg bypass graft with severe ischemic rest pain of the left foot. He presents for thrombectomy with possible revision. PROCEDURE:  After informed consent was obtained, the patient was given preoperative intravenous antibiotics within 1 hour of the incision. He was taken to the operating room and after induction of adequate general anesthesia, the left groin and entire left leg were prepped and draped as a sterile field. A small transverse incision was made over the bypass in the mid to anterolateral left thigh.   The bypass was easily dissected free within the subcutaneous tissue and encircled proximally and distally

## 2023-05-30 NOTE — OP NOTE
[address]  OPERATIVE REPORT    Name:  Nayla Hess  MR#:  [id number]  :  1975  ACCOUNT #:  [de-identified]  DATE OF SERVICE:  2023    PREOPERATIVE DIAGNOSIS:  Thrombosed left femoral to anterior tibial bypass with ischemic rest pain. POSTOPERATIVE DIAGNOSIS:  Thrombosed left femoral to anterior tibial bypass with ischemic rest pain. PROCEDURE PERFORMED:  1. Thrombectomy and revision of left femoral to anterior tibial bypass. 2.  Left lower extremity arteriograms. SURGEON:  Teresa Vickers MD    ASSISTANT:  None. ANESTHESIA:  General.    COMPLICATIONS:  None. SPECIMENS REMOVED:  None. IMPLANTS:  6 mm DISTAFLO graft. ESTIMATED BLOOD LOSS:  200 mL. DRAINS:  None. INDICATIONS:  The patient is a 43-year-old male with complex medical history including end-stage renal disease and severe peripheral arterial disease of the left leg with a left common femoral to anterior tibial artery bypass approximately 12 years ago. On this bypass, remained patent for many years on chronic anticoagulation, but the anticoagulation had to be stopped relatively recently due to gastrointestinal bleeding. The patient presented for this hospital admission with a thrombosed bypass and severe ischemic rest pain of the left foot. One week ago, he underwent a surgical thrombectomy and revision of the bypass with replacement of probably 95% of the bypass with a new anastomosis from the common femoral artery and new graft all the way down to the terminal segment of the old graft, where an end-to-end anastomosis was done. 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 The patient has now had recurrent thrombosis of the bypass and has severe intractable rest pain of the left foot. He requested a repeat attempt at thrombectomy prior to progressing to a major amputation.     PROCEDURE:  After informed consent was

## 2023-05-30 NOTE — OP NOTE
[address]  OPERATIVE REPORT    Name:  Ingrid Conner  MR#:  [id number]  :  1975  ACCOUNT #:  [de-identified]  DATE OF SERVICE:  2023    PREOPERATIVE DIAGNOSIS:  Ischemic left foot. POSTOPERATIVE DIAGNOSIS:  Ischemic left foot. PROCEDURE PERFORMED:  Left below-knee amputation. SURGEON:  Josep Singh MD    ASSISTANT:  None. ANESTHESIA:  General.    COMPLICATIONS:  None. SPECIMENS REMOVED:  Left leg. IMPLANTS:  None. ESTIMATED BLOOD LOSS:  300 mL. DRAINS:  None. INDICATIONS:  The patient is a [id number] male with complicated medical history. He has failed repeated attempts at revascularization of the left leg and has intractable, intolerable ischemic rest pain of the left foot with no further options for revascularization. He has failed bypass as a femoral to anterior tibial bypass, so a discussion was had preoperatively about the choice between a below knee and above-the-knee amputation. He understands that there is any increased risk of failure of a below-the-knee amputation due to ischemia of the stump but that he would have a better level of function with a prosthesis with a below-the-knee amputation. He has opted to attempt a below-the-knee amputation. PROCEDURE:  After informed consent was obtained, the patient was given preoperative intravenous antibiotics within 1 hour of the incision. He was taken to the operating room and after induction of adequate general anesthesia, the left lower leg was prepped and draped as a sterile field. A standard posterior flap based incision was made in the left lower leg and subcutaneous tissue and fascia were divided with electrocautery. The muscles of the anterior compartment were divided with electrocautery and the anterior tibial neurovascular bundle was dissected free and divided between 0 silk ties. The periosteum of the tibia was scored with electrocautery.   The muscles of lateral compartment

## 2023-05-31 LAB
ABO + RH BLD: NORMAL
BLD PROD TYP BPU: NORMAL
BLOOD BANK DISPENSE STATUS: NORMAL
BLOOD GROUP ANTIBODIES SERPL: NORMAL
BPU ID: NORMAL
CROSSMATCH RESULT: NORMAL
SPECIMEN EXP DATE BLD: NORMAL
UNIT DIVISION: 0

## 2023-06-20 ENCOUNTER — HOSPITAL ENCOUNTER (EMERGENCY)
Facility: HOSPITAL | Age: 48
Discharge: HOME | End: 2023-06-20
Attending: EMERGENCY MEDICINE
Payer: MEDICARE

## 2023-06-20 ENCOUNTER — APPOINTMENT (OUTPATIENT)
Facility: HOSPITAL | Age: 48
End: 2023-06-20
Payer: MEDICARE

## 2023-06-20 VITALS
BODY MASS INDEX: 17.26 KG/M2 | RESPIRATION RATE: 16 BRPM | WEIGHT: 110.23 LBS | OXYGEN SATURATION: 100 % | HEART RATE: 88 BPM | TEMPERATURE: 97.7 F | SYSTOLIC BLOOD PRESSURE: 135 MMHG | DIASTOLIC BLOOD PRESSURE: 89 MMHG

## 2023-06-20 DIAGNOSIS — K59.00 CONSTIPATION, UNSPECIFIED CONSTIPATION TYPE: Primary | ICD-10-CM

## 2023-06-20 LAB
ALBUMIN SERPL-MCNC: 3.7 G/DL (ref 3.5–5)
ALBUMIN/GLOB SERPL: 0.7 (ref 1.1–2.2)
ALP SERPL-CCNC: 359 U/L (ref 45–117)
ALT SERPL-CCNC: 11 U/L (ref 12–78)
ANION GAP SERPL CALC-SCNC: 11 MMOL/L (ref 5–15)
AST SERPL-CCNC: 16 U/L (ref 15–37)
BASOPHILS # BLD: 0.1 K/UL (ref 0–0.1)
BASOPHILS NFR BLD: 1 % (ref 0–1)
BILIRUB SERPL-MCNC: 0.5 MG/DL (ref 0.2–1)
BUN SERPL-MCNC: 37 MG/DL (ref 6–20)
BUN/CREAT SERPL: 6 (ref 12–20)
CALCIUM SERPL-MCNC: 9.4 MG/DL (ref 8.5–10.1)
CHLORIDE SERPL-SCNC: 96 MMOL/L (ref 97–108)
CO2 SERPL-SCNC: 26 MMOL/L (ref 21–32)
CREAT SERPL-MCNC: 6.66 MG/DL (ref 0.7–1.3)
DIFFERENTIAL METHOD BLD: ABNORMAL
EOSINOPHIL # BLD: 0.2 K/UL (ref 0–0.4)
EOSINOPHIL NFR BLD: 2 % (ref 0–7)
ERYTHROCYTE [DISTWIDTH] IN BLOOD BY AUTOMATED COUNT: 16.5 % (ref 11.5–14.5)
GLOBULIN SER CALC-MCNC: 5.1 G/DL (ref 2–4)
GLUCOSE SERPL-MCNC: 96 MG/DL (ref 65–100)
HCT VFR BLD AUTO: 34.8 % (ref 36.6–50.3)
HGB BLD-MCNC: 11 G/DL (ref 12.1–17)
IMM GRANULOCYTES # BLD AUTO: 0 K/UL (ref 0–0.04)
IMM GRANULOCYTES NFR BLD AUTO: 0 % (ref 0–0.5)
LIPASE SERPL-CCNC: 87 U/L (ref 73–393)
LYMPHOCYTES # BLD: 1.5 K/UL (ref 0.8–3.5)
LYMPHOCYTES NFR BLD: 12 % (ref 12–49)
MCH RBC QN AUTO: 29.6 PG (ref 26–34)
MCHC RBC AUTO-ENTMCNC: 31.6 G/DL (ref 30–36.5)
MCV RBC AUTO: 93.8 FL (ref 80–99)
MONOCYTES # BLD: 1 K/UL (ref 0–1)
MONOCYTES NFR BLD: 8 % (ref 5–13)
NEUTS SEG # BLD: 10 K/UL (ref 1.8–8)
NEUTS SEG NFR BLD: 78 % (ref 32–75)
NRBC # BLD: 0 K/UL (ref 0–0.01)
NRBC BLD-RTO: 0 PER 100 WBC
PLATELET # BLD AUTO: 251 K/UL (ref 150–400)
PMV BLD AUTO: 10.7 FL (ref 8.9–12.9)
POTASSIUM SERPL-SCNC: 3.5 MMOL/L (ref 3.5–5.1)
PROT SERPL-MCNC: 8.8 G/DL (ref 6.4–8.2)
RBC # BLD AUTO: 3.71 M/UL (ref 4.1–5.7)
SODIUM SERPL-SCNC: 133 MMOL/L (ref 136–145)
WBC # BLD AUTO: 12.8 K/UL (ref 4.1–11.1)

## 2023-06-20 PROCEDURE — 85025 COMPLETE CBC W/AUTO DIFF WBC: CPT

## 2023-06-20 PROCEDURE — 80053 COMPREHEN METABOLIC PANEL: CPT

## 2023-06-20 PROCEDURE — 83690 ASSAY OF LIPASE: CPT

## 2023-06-20 PROCEDURE — 36415 COLL VENOUS BLD VENIPUNCTURE: CPT

## 2023-06-20 PROCEDURE — 6370000000 HC RX 637 (ALT 250 FOR IP)

## 2023-06-20 PROCEDURE — 99283 EMERGENCY DEPT VISIT LOW MDM: CPT

## 2023-06-20 RX ORDER — POLYETHYLENE GLYCOL 3350 17 G/17G
17 POWDER, FOR SOLUTION ORAL DAILY PRN
Qty: 527 G | Refills: 1 | Status: SHIPPED | OUTPATIENT
Start: 2023-06-20 | End: 2023-07-20

## 2023-06-20 RX ADMIN — MAGNESIUM HYDROXIDE 30 ML: 400 SUSPENSION ORAL at 17:01

## 2023-06-20 ASSESSMENT — PAIN SCALES - GENERAL: PAINLEVEL_OUTOF10: 7

## 2023-06-20 NOTE — ED PROVIDER NOTES
[facility] EMERGENCY DEPT  EMERGENCY DEPARTMENT ENCOUNTER       Pt Name: Barby Griggs  MRN: [id number]  [place] 1975  Date of evaluation: 6/20/2023  Provider: Radha Ingram PA-C   PCP: Jules Montero MD  Note Started: 4:27 PM EDT 6/20/23     CHIEF COMPLAINT       Chief Complaint   Patient presents with    Fecal Impaction     Over a week, cannot have bm pt is wheelchair bound due to amputation left lower leg        HISTORY OF PRESENT ILLNESS: 1 or more elements      History From: Patient  HPI Limitations: None     Barby Griggs is a 41 y.o. male with past medical history significant for end-stage renal disease on home hemodialysis 5 days a week, coronary artery disease s/p stents, AICD, congestive heart failure, femoropopliteal bypass, left BKA past surgical history significant  for appendectomy, cholecystectomy, resection of small bowel due to necrotic bowel who presents to ED with chief complaint of constipation. He reports his last bowel movement was 2 weeks ago. He is on senakot daily, reports he tried lactulose this morning, and reports feeling urge to have BM but has been unable to do so. Takes oxycodone 10 mg every 4 hours as needed for pain. He is also complaining of generalized abdominal pain. He denies fever, chills, nausea, vomiting, rectal bleeding. He is on dialysis and does not make urine. Nursing Notes were all reviewed and agreed with or any disagreements were addressed in the HPI. REVIEW OF SYSTEMS      Review of Systems     Positives and Pertinent negatives as per HPI. PAST HISTORY     Past Medical History:  Past Medical History:   Diagnosis Date    Abdominal hematoma     AICD (automatic cardioverter/defibrillator) present     CAD (coronary artery disease)     anterior MI s/p 2 stents  2007    Chronic abdominal pain     Chronic kidney disease     ESRD; Dialysis dependent.  [facility] home x5 days week M-F does labs    DVT (deep venous thrombosis) ([facility] [address] 2001

## 2023-06-20 NOTE — DISCHARGE INSTRUCTIONS
Return to the emergency department if you have any change or worsening of your symptoms. Please use the MiraLAX for constipation.

## 2023-06-20 NOTE — ED PROVIDER NOTES
I personally saw and evaluated the patient and performed a substantive portion of the visit including all aspects of the medical decision making. All diagnostic, treatment, and disposition decisions were made by me in conjunction with the MI[facility] as noted in the medical record. Patient is a 44-year-old male, multimedical problems, presenting with severe constipation discomfort. Patient reports not having a bowel movement for 2 weeks, chronically suffers from similar symptoms. On physical exam patient has some mild mild lower quadrant discomfort without significant point tenderness or rebound. Rectal examination shows a large hard stool in the rectum, disimpaction performed manually by myself. Assessment: Patient lab work was obtained, unremarkable lab work with no signs of organ dysfunction, no signs of needing emergent dialysis. Plan is: We will continue with magnesium citrate therapy, soapsuds enema, and outpatient management of patient's constipation we will follow-up with primary care provider.      Shelli Belcher MD  06/20/23 4679

## 2023-07-12 ENCOUNTER — HOSPITAL ENCOUNTER (EMERGENCY)
Facility: HOSPITAL | Age: 48
Discharge: HOME | End: 2023-07-12
Attending: EMERGENCY MEDICINE
Payer: MEDICARE

## 2023-07-12 VITALS
DIASTOLIC BLOOD PRESSURE: 57 MMHG | RESPIRATION RATE: 16 BRPM | HEART RATE: 86 BPM | SYSTOLIC BLOOD PRESSURE: 100 MMHG | BODY MASS INDEX: 17.3 KG/M2 | WEIGHT: 110.23 LBS | HEIGHT: 67 IN | OXYGEN SATURATION: 98 % | TEMPERATURE: 98.2 F

## 2023-07-12 DIAGNOSIS — K59.04 CHRONIC IDIOPATHIC CONSTIPATION: ICD-10-CM

## 2023-07-12 DIAGNOSIS — K56.41 FECAL IMPACTION IN RECTUM (HCC): Primary | ICD-10-CM

## 2023-07-12 PROCEDURE — 99282 EMERGENCY DEPT VISIT SF MDM: CPT

## 2023-07-12 RX ORDER — LACTULOSE 10 G/15ML
30 SOLUTION ORAL ONCE
Status: DISCONTINUED | OUTPATIENT
Start: 2023-07-12 | End: 2023-07-12 | Stop reason: HOSPADM

## 2023-07-12 ASSESSMENT — ENCOUNTER SYMPTOMS
ABDOMINAL PAIN: 0
CONSTIPATION: 1

## 2023-07-12 ASSESSMENT — PAIN SCALES - GENERAL: PAINLEVEL_OUTOF10: 4

## 2023-07-12 NOTE — ED NOTES
Instilled majority of soap suds enema, pt up to commode without assist to have large soft BM. Pt expresses relief from abd tenderness/distension after BM.      Fareed Raza RN  07/12/23 5309

## 2023-07-12 NOTE — ED PROVIDER NOTES
Genitourinary:     Comments: Moderate stool in the rectal vault, but too high and too soft for manual disimpaction      Diagnostic Study Results   Labs  No results found for this or any previous visit (from the past 24 hour(s)).    ==============================================================    Radiologic Studies  No orders to display          Critical Care and Billable Procedures   EKG reviewed by ED Physician Lizette Hagen in the absence of a cardiologist: Yes  EKG below was independently interpreted by me Anna Castillo MD)    Procedures    Medical Decision Making   I reviewed the patient's most recent Emergency Dept notes and diagnostic tests in formulating my MDM on today's visit. Medications administered during ED course:  Medications   lactulose (CHRONULAC) 10 GM/15ML solution 30 g (30 g Oral Not Given 7/12/23 0731)     Medical Decision Making // ED Course // Reassessment:  Niraj Stein, 41 y.o. male With extensive medical comorbidities including ESRD on hemodialysis, severe vascular issues, peripheral vascular disease, coronary artery disease etc., presents with acute on chronic constipation and sense of stool impacted in the rectum. He had to be disimpacted under similar circumstances in this emergency department 3 weeks ago. No significant abdominal pain. He takes lactulose and senna and Colace at baseline. Clinically well-appearing, no distress, abdomen soft and nontender. Rectal exam was performed, stool was noted in the rectal vault but it is a bit too high and a bit too soft for manual disimpaction. Had BM after soap suds enema, felt better. D/C home. Differential Diagnoses ruled out: SBO    Final Diagnosis:   1. Fecal impaction in rectum ([address])    2. Chronic idiopathic constipation        Additional documentation if relevant for this encounter       Diagnosis and Disposition     Disposition: Decision To Discharge 07/12/2023 08:20:57 AM     Final Diagnosis:   1.  Fecal impaction

## 2023-07-12 NOTE — ED NOTES
DC papers reviewed and in hand, pt verbalized understanding. Escorted to waiting room to await ride home, no acute distress noted.         Josep Muñoz RN  07/12/23 4419

## 2023-07-18 ENCOUNTER — APPOINTMENT (OUTPATIENT)
Facility: HOSPITAL | Age: 48
End: 2023-07-18
Payer: MEDICARE

## 2023-07-18 ENCOUNTER — HOSPITAL ENCOUNTER (EMERGENCY)
Facility: HOSPITAL | Age: 48
Discharge: HOME | End: 2023-07-18
Attending: EMERGENCY MEDICINE
Payer: MEDICARE

## 2023-07-18 VITALS
DIASTOLIC BLOOD PRESSURE: 90 MMHG | HEART RATE: 80 BPM | RESPIRATION RATE: 18 BRPM | HEIGHT: 67 IN | TEMPERATURE: 97.9 F | SYSTOLIC BLOOD PRESSURE: 143 MMHG | OXYGEN SATURATION: 99 % | WEIGHT: 110 LBS | BODY MASS INDEX: 17.27 KG/M2

## 2023-07-18 DIAGNOSIS — R11.2 NAUSEA AND VOMITING, UNSPECIFIED VOMITING TYPE: Primary | ICD-10-CM

## 2023-07-18 LAB
ALBUMIN SERPL-MCNC: 3.6 G/DL (ref 3.5–5)
ALBUMIN/GLOB SERPL: 0.6 (ref 1.1–2.2)
ALP SERPL-CCNC: 324 U/L (ref 45–117)
ALT SERPL-CCNC: 9 U/L (ref 12–78)
ANION GAP SERPL CALC-SCNC: 10 MMOL/L (ref 5–15)
AST SERPL-CCNC: 12 U/L (ref 15–37)
BASOPHILS # BLD: 0.1 K/UL (ref 0–0.1)
BASOPHILS NFR BLD: 1 % (ref 0–1)
BILIRUB SERPL-MCNC: 0.5 MG/DL (ref 0.2–1)
BUN SERPL-MCNC: 63 MG/DL (ref 6–20)
BUN/CREAT SERPL: 6 (ref 12–20)
CALCIUM SERPL-MCNC: 9.8 MG/DL (ref 8.5–10.1)
CHLORIDE SERPL-SCNC: 91 MMOL/L (ref 97–108)
CO2 SERPL-SCNC: 26 MMOL/L (ref 21–32)
CREAT SERPL-MCNC: 10 MG/DL (ref 0.7–1.3)
DIFFERENTIAL METHOD BLD: ABNORMAL
EOSINOPHIL # BLD: 0.5 K/UL (ref 0–0.4)
EOSINOPHIL NFR BLD: 4 % (ref 0–7)
ERYTHROCYTE [DISTWIDTH] IN BLOOD BY AUTOMATED COUNT: 15.8 % (ref 11.5–14.5)
GLOBULIN SER CALC-MCNC: 6.1 G/DL (ref 2–4)
GLUCOSE SERPL-MCNC: 98 MG/DL (ref 65–100)
HCT VFR BLD AUTO: 31.3 % (ref 36.6–50.3)
HGB BLD-MCNC: 10.4 G/DL (ref 12.1–17)
IMM GRANULOCYTES # BLD AUTO: 0 K/UL (ref 0–0.04)
IMM GRANULOCYTES NFR BLD AUTO: 0 % (ref 0–0.5)
LIPASE SERPL-CCNC: 171 U/L (ref 73–393)
LYMPHOCYTES # BLD: 1.5 K/UL (ref 0.8–3.5)
LYMPHOCYTES NFR BLD: 13 % (ref 12–49)
MCH RBC QN AUTO: 29 PG (ref 26–34)
MCHC RBC AUTO-ENTMCNC: 33.2 G/DL (ref 30–36.5)
MCV RBC AUTO: 87.2 FL (ref 80–99)
MONOCYTES # BLD: 0.9 K/UL (ref 0–1)
MONOCYTES NFR BLD: 8 % (ref 5–13)
NEUTS SEG # BLD: 8.4 K/UL (ref 1.8–8)
NEUTS SEG NFR BLD: 74 % (ref 32–75)
NRBC # BLD: 0 K/UL (ref 0–0.01)
NRBC BLD-RTO: 0 PER 100 WBC
PLATELET # BLD AUTO: 257 K/UL (ref 150–400)
PMV BLD AUTO: 9.9 FL (ref 8.9–12.9)
POTASSIUM SERPL-SCNC: 6.2 MMOL/L (ref 3.5–5.1)
PROT SERPL-MCNC: 9.7 G/DL (ref 6.4–8.2)
RBC # BLD AUTO: 3.59 M/UL (ref 4.1–5.7)
RBC MORPH BLD: ABNORMAL
SODIUM SERPL-SCNC: 127 MMOL/L (ref 136–145)
WBC # BLD AUTO: 11.4 K/UL (ref 4.1–11.1)
WBC MORPH BLD: ABNORMAL

## 2023-07-18 PROCEDURE — 85025 COMPLETE CBC W/AUTO DIFF WBC: CPT

## 2023-07-18 PROCEDURE — 6360000002 HC RX W HCPCS

## 2023-07-18 PROCEDURE — 2500000003 HC RX 250 WO HCPCS

## 2023-07-18 PROCEDURE — 83690 ASSAY OF LIPASE: CPT

## 2023-07-18 PROCEDURE — 80053 COMPREHEN METABOLIC PANEL: CPT

## 2023-07-18 PROCEDURE — 99284 EMERGENCY DEPT VISIT MOD MDM: CPT

## 2023-07-18 PROCEDURE — 74176 CT ABD & PELVIS W/O CONTRAST: CPT

## 2023-07-18 PROCEDURE — 36415 COLL VENOUS BLD VENIPUNCTURE: CPT

## 2023-07-18 PROCEDURE — 96374 THER/PROPH/DIAG INJ IV PUSH: CPT

## 2023-07-18 PROCEDURE — 96375 TX/PRO/DX INJ NEW DRUG ADDON: CPT

## 2023-07-18 RX ORDER — ONDANSETRON 2 MG/ML
4 INJECTION INTRAMUSCULAR; INTRAVENOUS ONCE
Status: COMPLETED | OUTPATIENT
Start: 2023-07-18 | End: 2023-07-18

## 2023-07-18 RX ORDER — ONDANSETRON 4 MG/1
4 TABLET, ORALLY DISINTEGRATING ORAL 3 TIMES DAILY PRN
Qty: 6 TABLET | Refills: 0 | Status: SHIPPED | OUTPATIENT
Start: 2023-07-18 | End: 2023-07-21 | Stop reason: ALTCHOICE

## 2023-07-18 RX ORDER — HYDROMORPHONE HYDROCHLORIDE 1 MG/ML
1 INJECTION, SOLUTION INTRAMUSCULAR; INTRAVENOUS; SUBCUTANEOUS
Status: COMPLETED | OUTPATIENT
Start: 2023-07-18 | End: 2023-07-18

## 2023-07-18 RX ADMIN — HYDROMORPHONE HYDROCHLORIDE 1 MG: 1 INJECTION, SOLUTION INTRAMUSCULAR; INTRAVENOUS; SUBCUTANEOUS at 17:50

## 2023-07-18 RX ADMIN — ONDANSETRON 4 MG: 2 INJECTION INTRAMUSCULAR; INTRAVENOUS at 17:50

## 2023-07-18 ASSESSMENT — PAIN DESCRIPTION - LOCATION: LOCATION: ABDOMEN

## 2023-07-18 ASSESSMENT — PAIN SCALES - GENERAL: PAINLEVEL_OUTOF10: 7

## 2023-07-18 ASSESSMENT — PAIN DESCRIPTION - ORIENTATION: ORIENTATION: LEFT;LOWER

## 2023-07-18 NOTE — ED PROVIDER NOTES
[facility] EMERGENCY DEPT  EMERGENCY DEPARTMENT ENCOUNTER       Pt Name: Milton Marina  MRN: [id number]  [address] [facility] 1975  Date of evaluation: 7/18/2023  Provider: Hardik Edmonds MD   PCP: Lady Andrea MD  Note Started: 5:58 PM EDT 7/18/23     CHIEF COMPLAINT       Chief Complaint   Patient presents with    Abdominal Pain     Patient presents to triage via personal wheelchair. Patient reports LLQ abdominal pain, nausea and vomiting that began around 10:00a. Patient denies any recent fevers or chills. Patient is ESRD with dialysis on MW&Fs. Patient denies any missed sessions. HISTORY OF PRESENT ILLNESS: 1 or more elements      History From: Patient  HPI Limitations: None     Milton Marina is a 41 y.o. male who presents for evaluation of a 1 day history of nausea, vomiting and left lower quadrant abdominal pain. Additionally, he reports that he has had loose stools during the day today which is abnormal for him but does not describe denies diarrhea. He has a history of constipation and fecal impaction requiring disimpaction here in the emergency department. He reports he has had normal bowel movements the 2 days preceding his presentation today, his symptoms are not consistent with his previous obstruction, using senna and docusate at home as laxatives with lactulose prn. Denies chest pain, shortness of breath. On dialysis MWF for ESRD, completed full dialysis session on Monday, no complications. Nursing Notes were all reviewed and agreed with or any disagreements were addressed in the HPI. REVIEW OF SYSTEMS      Review of Systems     Positives and Pertinent negatives as per HPI. PAST HISTORY     Past Medical History:  Past Medical History:   Diagnosis Date    Abdominal hematoma     AICD (automatic cardioverter/defibrillator) present     CAD (coronary artery disease)     anterior MI s/p 2 stents  2007    Chronic abdominal pain     Chronic kidney disease     ESRD; Dialysis dependent.  Home

## 2023-07-18 NOTE — DISCHARGE INSTRUCTIONS
Your evaluated in the Emergency Department for your abdominal pain. It is possible that your pain is being caused by early onset of a viral infection. You are prescribed Zofran for your nausea. Please return to the emergency department immediately if you develop worsening abdominal pain and inability to eat or drink, signs of an obstruction, severe constipation.

## 2023-07-18 NOTE — ED NOTES
DC info reviewed with patient, all questions answered. Patient well-appearing at time of discharge and vital signs stable. Ambulatory out of ED at this time.        Erwin Pastor RN  07/18/23 1948

## 2023-07-20 ENCOUNTER — HOSPITAL ENCOUNTER (EMERGENCY)
Facility: HOSPITAL | Age: 48
Discharge: HOME | End: 2023-07-21
Attending: STUDENT IN AN ORGANIZED HEALTH CARE EDUCATION/TRAINING PROGRAM
Payer: MEDICARE

## 2023-07-20 VITALS
BODY MASS INDEX: 17.65 KG/M2 | TEMPERATURE: 98.8 F | DIASTOLIC BLOOD PRESSURE: 70 MMHG | HEART RATE: 92 BPM | RESPIRATION RATE: 16 BRPM | SYSTOLIC BLOOD PRESSURE: 110 MMHG | WEIGHT: 112.43 LBS | HEIGHT: 67 IN | OXYGEN SATURATION: 99 %

## 2023-07-20 DIAGNOSIS — K29.90 GASTRITIS AND DUODENITIS: ICD-10-CM

## 2023-07-20 DIAGNOSIS — R10.13 ABDOMINAL PAIN, EPIGASTRIC: Primary | ICD-10-CM

## 2023-07-20 LAB
ALBUMIN SERPL-MCNC: 3.4 G/DL (ref 3.5–5)
ALBUMIN/GLOB SERPL: 0.6 (ref 1.1–2.2)
ALP SERPL-CCNC: 297 U/L (ref 45–117)
ALT SERPL-CCNC: 8 U/L (ref 12–78)
ANION GAP SERPL CALC-SCNC: 10 MMOL/L (ref 5–15)
AST SERPL-CCNC: 11 U/L (ref 15–37)
BASOPHILS # BLD: 0.1 K/UL (ref 0–0.1)
BASOPHILS NFR BLD: 1 % (ref 0–1)
BILIRUB SERPL-MCNC: 0.4 MG/DL (ref 0.2–1)
BUN SERPL-MCNC: 29 MG/DL (ref 6–20)
BUN/CREAT SERPL: 4 (ref 12–20)
CALCIUM SERPL-MCNC: 9.5 MG/DL (ref 8.5–10.1)
CHLORIDE SERPL-SCNC: 97 MMOL/L (ref 97–108)
CO2 SERPL-SCNC: 26 MMOL/L (ref 21–32)
COMMENT:: NORMAL
CREAT SERPL-MCNC: 6.62 MG/DL (ref 0.7–1.3)
DIFFERENTIAL METHOD BLD: ABNORMAL
EKG ATRIAL RATE: 80 BPM
EKG DIAGNOSIS: NORMAL
EKG P AXIS: 66 DEGREES
EKG P-R INTERVAL: 148 MS
EKG Q-T INTERVAL: 426 MS
EKG QRS DURATION: 96 MS
EKG QTC CALCULATION (BAZETT): 491 MS
EKG R AXIS: 137 DEGREES
EKG T AXIS: -57 DEGREES
EKG VENTRICULAR RATE: 80 BPM
EOSINOPHIL # BLD: 0.5 K/UL (ref 0–0.4)
EOSINOPHIL NFR BLD: 5 % (ref 0–7)
ERYTHROCYTE [DISTWIDTH] IN BLOOD BY AUTOMATED COUNT: 16.2 % (ref 11.5–14.5)
GLOBULIN SER CALC-MCNC: 5.5 G/DL (ref 2–4)
GLUCOSE SERPL-MCNC: 103 MG/DL (ref 65–100)
HCT VFR BLD AUTO: 29 % (ref 36.6–50.3)
HGB BLD-MCNC: 9.4 G/DL (ref 12.1–17)
IMM GRANULOCYTES # BLD AUTO: 0 K/UL (ref 0–0.04)
IMM GRANULOCYTES NFR BLD AUTO: 0 % (ref 0–0.5)
LIPASE SERPL-CCNC: 208 U/L (ref 73–393)
LYMPHOCYTES # BLD: 1.6 K/UL (ref 0.8–3.5)
LYMPHOCYTES NFR BLD: 15 % (ref 12–49)
MCH RBC QN AUTO: 29.6 PG (ref 26–34)
MCHC RBC AUTO-ENTMCNC: 32.4 G/DL (ref 30–36.5)
MCV RBC AUTO: 91.2 FL (ref 80–99)
MONOCYTES # BLD: 1.1 K/UL (ref 0–1)
MONOCYTES NFR BLD: 10 % (ref 5–13)
NEUTS SEG # BLD: 7.6 K/UL (ref 1.8–8)
NEUTS SEG NFR BLD: 69 % (ref 32–75)
NRBC # BLD: 0 K/UL (ref 0–0.01)
NRBC BLD-RTO: 0 PER 100 WBC
PLATELET # BLD AUTO: 225 K/UL (ref 150–400)
PMV BLD AUTO: 9.9 FL (ref 8.9–12.9)
POTASSIUM SERPL-SCNC: 4.4 MMOL/L (ref 3.5–5.1)
PROT SERPL-MCNC: 8.9 G/DL (ref 6.4–8.2)
RBC # BLD AUTO: 3.18 M/UL (ref 4.1–5.7)
SODIUM SERPL-SCNC: 133 MMOL/L (ref 136–145)
SPECIMEN HOLD: NORMAL
WBC # BLD AUTO: 10.9 K/UL (ref 4.1–11.1)

## 2023-07-20 PROCEDURE — A4216 STERILE WATER/SALINE, 10 ML: HCPCS | Performed by: STUDENT IN AN ORGANIZED HEALTH CARE EDUCATION/TRAINING PROGRAM

## 2023-07-20 PROCEDURE — 85025 COMPLETE CBC W/AUTO DIFF WBC: CPT

## 2023-07-20 PROCEDURE — 83690 ASSAY OF LIPASE: CPT

## 2023-07-20 PROCEDURE — 96374 THER/PROPH/DIAG INJ IV PUSH: CPT

## 2023-07-20 PROCEDURE — 96375 TX/PRO/DX INJ NEW DRUG ADDON: CPT

## 2023-07-20 PROCEDURE — 80053 COMPREHEN METABOLIC PANEL: CPT

## 2023-07-20 PROCEDURE — 99284 EMERGENCY DEPT VISIT MOD MDM: CPT

## 2023-07-20 PROCEDURE — 36415 COLL VENOUS BLD VENIPUNCTURE: CPT

## 2023-07-20 PROCEDURE — 2500000003 HC RX 250 WO HCPCS: Performed by: STUDENT IN AN ORGANIZED HEALTH CARE EDUCATION/TRAINING PROGRAM

## 2023-07-20 PROCEDURE — 6360000002 HC RX W HCPCS: Performed by: STUDENT IN AN ORGANIZED HEALTH CARE EDUCATION/TRAINING PROGRAM

## 2023-07-20 PROCEDURE — 84484 ASSAY OF TROPONIN QUANT: CPT

## 2023-07-20 PROCEDURE — 2580000003 HC RX 258: Performed by: STUDENT IN AN ORGANIZED HEALTH CARE EDUCATION/TRAINING PROGRAM

## 2023-07-20 RX ORDER — DROPERIDOL 2.5 MG/ML
0.62 INJECTION, SOLUTION INTRAMUSCULAR; INTRAVENOUS ONCE
Status: COMPLETED | OUTPATIENT
Start: 2023-07-20 | End: 2023-07-20

## 2023-07-20 RX ADMIN — DROPERIDOL 0.62 MG: 2.5 INJECTION, SOLUTION INTRAMUSCULAR; INTRAVENOUS at 22:23

## 2023-07-20 RX ADMIN — FAMOTIDINE 20 MG: 10 INJECTION, SOLUTION INTRAVENOUS at 22:23

## 2023-07-20 ASSESSMENT — LIFESTYLE VARIABLES
HOW OFTEN DO YOU HAVE A DRINK CONTAINING ALCOHOL: NEVER
HOW MANY STANDARD DRINKS CONTAINING ALCOHOL DO YOU HAVE ON A TYPICAL DAY: PATIENT DOES NOT DRINK

## 2023-07-20 ASSESSMENT — PAIN - FUNCTIONAL ASSESSMENT: PAIN_FUNCTIONAL_ASSESSMENT: 0-10

## 2023-07-20 ASSESSMENT — PAIN DESCRIPTION - LOCATION
LOCATION: ABDOMEN
LOCATION: ABDOMEN

## 2023-07-20 ASSESSMENT — PAIN SCALES - GENERAL
PAINLEVEL_OUTOF10: 7
PAINLEVEL_OUTOF10: 7

## 2023-07-20 ASSESSMENT — PAIN DESCRIPTION - ORIENTATION: ORIENTATION: LEFT

## 2023-07-21 LAB — TROPONIN I SERPL HS-MCNC: 23 NG/L (ref 0–76)

## 2023-07-21 RX ORDER — ONDANSETRON 4 MG/1
4 TABLET, ORALLY DISINTEGRATING ORAL EVERY 8 HOURS PRN
Qty: 12 TABLET | Refills: 0 | Status: SHIPPED | OUTPATIENT
Start: 2023-07-21

## 2023-07-21 NOTE — ED PROVIDER NOTES
[facility] EMERGENCY DEPT  EMERGENCY DEPARTMENT ENCOUNTER       Pt Name: Niraj Stein  MRN: [id number]  [address] [facility] 1975  Date of evaluation: 7/20/2023  Provider: Isreal Joshua MD   PCP: Viky Queen MD  Note Started: 10:52 PM EDT 7/20/23     CHIEF COMPLAINT       Chief Complaint   Patient presents with    Abdominal Pain     Pt arrives ambulatory to triage for N/V despite zofran usage. Pt recently seen on Tuesday and felt better at time of d/c; however, he is back to feeling the abdominal pain and n/v as well. Pt advises he has has normal bowel movements. Pt advises a home health nurse saw him today and advised him his BP was low and advised him to come in for fluids. HISTORY OF PRESENT ILLNESS: 1 or more elements      History From: patient, History limited by: none     Niraj Stein is a 41 y.o. male presenting with nausea vomiting abdominal pain. Has been on for a few days. Notes he was seen in the ED 2 days ago and felt better before being discharged. Notes the next day he returned to having his epigastric pain and was vomiting. Try to take Zofran but vomited up. Notes a bowel movements been normal.  Does not make urine. Dialysis Monday Wednesday Friday is been going fine. Notes he is at his dry weight of 50 kg. Please See [facility] for Additional Details of the HPI/PMH  Nursing Notes were all reviewed and agreed with or any disagreements were addressed in the HPI. REVIEW OF SYSTEMS        Positives and Pertinent negatives as per HPI. PAST HISTORY     Past Medical History:  Past Medical History:   Diagnosis Date    Abdominal hematoma     AICD (automatic cardioverter/defibrillator) present     CAD (coronary artery disease)     anterior MI s/p 2 stents  2007    Chronic abdominal pain     Chronic kidney disease     ESRD; Dialysis dependent.  30 [facility] home x5 days week M-F does labs    DVT (deep venous thrombosis) ([address]) 2001    DVT of popliteal vein ([address]) 11/2011    not I am the Primary Clinician of Record. Kobe Miller MD (electronically signed)    (Please note that parts of this dictation were completed with voice recognition software. Quite often unanticipated grammatical, syntax, homophones, and other interpretive errors are inadvertently transcribed by the computer software. Please disregards these errors.  Please excuse any errors that have escaped final proofreading.)         Kobe Miller MD  Resident  07/21/23 1204

## 2023-07-21 NOTE — ED NOTES
DC info reviewed with Patient, all questions answered. Patient well-appearing at time of discharge, no acute distress. Pt independent in personal wheelchair out of ED at this time.         Rod Rendon, [address]  07/21/23 4267

## 2023-07-21 NOTE — ED NOTES
Pt reports home health nurse read 74/40 BP (x2) at 1700 this evening. Pt reports it was somewhat low for him today, too, and is typically \"like 140/80\". BP cycled and reads 92/64 (73) in room ED02.       Letty Dubon  07/20/23 2204

## 2023-07-24 LAB
EKG ATRIAL RATE: 80 BPM
EKG DIAGNOSIS: NORMAL
EKG P AXIS: 66 DEGREES
EKG P-R INTERVAL: 148 MS
EKG Q-T INTERVAL: 426 MS
EKG QRS DURATION: 96 MS
EKG QTC CALCULATION (BAZETT): 491 MS
EKG R AXIS: 137 DEGREES
EKG T AXIS: -57 DEGREES
EKG VENTRICULAR RATE: 80 BPM

## 2023-08-15 ENCOUNTER — HOSPITAL ENCOUNTER (EMERGENCY)
Facility: HOSPITAL | Age: 48
Discharge: HOME | End: 2023-08-16
Attending: EMERGENCY MEDICINE
Payer: MEDICARE

## 2023-08-15 VITALS
OXYGEN SATURATION: 99 % | RESPIRATION RATE: 17 BRPM | SYSTOLIC BLOOD PRESSURE: 117 MMHG | HEART RATE: 99 BPM | DIASTOLIC BLOOD PRESSURE: 84 MMHG | TEMPERATURE: 97.8 F

## 2023-08-15 DIAGNOSIS — K59.00 CONSTIPATION, UNSPECIFIED CONSTIPATION TYPE: Primary | ICD-10-CM

## 2023-08-15 PROCEDURE — 99283 EMERGENCY DEPT VISIT LOW MDM: CPT

## 2023-08-15 PROCEDURE — 6370000000 HC RX 637 (ALT 250 FOR IP): Performed by: EMERGENCY MEDICINE

## 2023-08-15 RX ORDER — BISACODYL 10 MG
10 SUPPOSITORY, RECTAL RECTAL DAILY PRN
Qty: 30 SUPPOSITORY | Refills: 0 | Status: SHIPPED | OUTPATIENT
Start: 2023-08-15 | End: 2023-09-14

## 2023-08-15 RX ORDER — LIDOCAINE HYDROCHLORIDE 20 MG/ML
JELLY TOPICAL PRN
Status: DISCONTINUED | OUTPATIENT
Start: 2023-08-15 | End: 2023-08-16 | Stop reason: HOSPADM

## 2023-08-15 RX ADMIN — LIDOCAINE HYDROCHLORIDE: 20 JELLY TOPICAL at 22:03

## 2023-08-15 ASSESSMENT — PAIN DESCRIPTION - ORIENTATION: ORIENTATION: LOWER

## 2023-08-15 ASSESSMENT — PAIN DESCRIPTION - DESCRIPTORS: DESCRIPTORS: PRESSURE

## 2023-08-15 ASSESSMENT — PAIN SCALES - GENERAL: PAINLEVEL_OUTOF10: 4

## 2023-08-15 ASSESSMENT — PAIN DESCRIPTION - LOCATION: LOCATION: ABDOMEN

## 2023-08-16 NOTE — ED PROVIDER NOTES
hours as needed for Nausea or Vomiting     ondansetron 4 MG tablet  Commonly known as: ZOFRAN     oxyCODONE HCl 10 MG immediate release tablet  Commonly known as: OXY-IR     pantoprazole 40 MG tablet  Commonly known as: PROTONIX     Sensipar 60 MG tablet  Generic drug: cinacalcet     sertraline 50 MG tablet  Commonly known as: ZOLOFT     sevelamer 800 MG tablet  Commonly known as: RENVELA     sucralfate 1 GM tablet  Commonly known as: CARAFATE               Where to Get Your Medications        These medications were sent to [facility]/pharmacy [address] [place] [address] [phone]  [address]      Hours: 24-hours Phone: [phone]   bisacodyl 10 MG suppository           DISCONTINUED MEDICATIONS:  Current Discharge Medication List          I am the Primary Clinician of Record. Yoandy Morgan MD (electronically signed)    (Please note that parts of this dictation were completed with voice recognition software. Quite often unanticipated grammatical, syntax, homophones, and other interpretive errors are inadvertently transcribed by the computer software. Please disregards these errors.  Please excuse any errors that have escaped final proofreading.)        Yoandy Morgan MD  08/18/23 4612

## 2023-08-18 ENCOUNTER — HOSPITAL ENCOUNTER (EMERGENCY)
Facility: HOSPITAL | Age: 48
Discharge: HOME | End: 2023-08-18
Payer: MEDICARE

## 2023-08-18 VITALS
DIASTOLIC BLOOD PRESSURE: 66 MMHG | RESPIRATION RATE: 16 BRPM | SYSTOLIC BLOOD PRESSURE: 102 MMHG | WEIGHT: 112.43 LBS | HEART RATE: 99 BPM | OXYGEN SATURATION: 96 % | TEMPERATURE: 98.8 F | BODY MASS INDEX: 17.61 KG/M2

## 2023-08-18 DIAGNOSIS — K59.00 CONSTIPATION, UNSPECIFIED CONSTIPATION TYPE: Primary | ICD-10-CM

## 2023-08-18 PROCEDURE — 99283 EMERGENCY DEPT VISIT LOW MDM: CPT

## 2023-08-18 PROCEDURE — 6370000000 HC RX 637 (ALT 250 FOR IP)

## 2023-08-18 RX ORDER — LIDOCAINE HYDROCHLORIDE 20 MG/ML
JELLY TOPICAL PRN
Status: DISCONTINUED | OUTPATIENT
Start: 2023-08-18 | End: 2023-08-18 | Stop reason: HOSPADM

## 2023-08-18 RX ORDER — POLYETHYLENE GLYCOL 3350 17 G/17G
17 POWDER, FOR SOLUTION ORAL 2 TIMES DAILY PRN
Qty: 1530 G | Refills: 1 | Status: SHIPPED | OUTPATIENT
Start: 2023-08-18 | End: 2024-02-14

## 2023-08-18 RX ADMIN — LIDOCAINE HYDROCHLORIDE: 20 JELLY TOPICAL at 17:04

## 2023-08-18 ASSESSMENT — PAIN SCALES - GENERAL: PAINLEVEL_OUTOF10: 4

## 2023-08-20 ENCOUNTER — HOSPITAL ENCOUNTER (EMERGENCY)
Facility: HOSPITAL | Age: 48
Discharge: HOME | End: 2023-08-20
Attending: EMERGENCY MEDICINE
Payer: MEDICARE

## 2023-08-20 VITALS
DIASTOLIC BLOOD PRESSURE: 80 MMHG | RESPIRATION RATE: 17 BRPM | TEMPERATURE: 98 F | OXYGEN SATURATION: 99 % | SYSTOLIC BLOOD PRESSURE: 110 MMHG | HEART RATE: 90 BPM

## 2023-08-20 DIAGNOSIS — K59.01 SLOW TRANSIT CONSTIPATION: Primary | ICD-10-CM

## 2023-08-20 PROCEDURE — 6370000000 HC RX 637 (ALT 250 FOR IP): Performed by: EMERGENCY MEDICINE

## 2023-08-20 PROCEDURE — 99283 EMERGENCY DEPT VISIT LOW MDM: CPT

## 2023-08-20 ASSESSMENT — ENCOUNTER SYMPTOMS
CONSTIPATION: 1
ABDOMINAL PAIN: 0
ABDOMINAL DISTENTION: 1

## 2023-08-20 NOTE — DISCHARGE INSTRUCTIONS
It was a pleasure taking care of you at [facility] Emergency Department today. We know that when you come to [address] [place] [address], you are entrusting us with your health, comfort, and safety. Our physicians and nurses honor that trust, and we truly appreciate the opportunity to care for you and your loved ones. We also value your feedback. If you receive a survey about your Emergency Department experience today, please fill it out. We care about our patients' feedback, and we listen to what you have to say. Thank you!

## 2023-08-20 NOTE — ED NOTES
Second soap suds enema, gave 1 L until patient couldn't tolerate     Alejandra Chawla RN  08/20/23 9039

## 2023-08-20 NOTE — ED NOTES
Patient states he can't drink the mag citrate because it tastes too bad     Terrance Montiel, JOJO  08/20/23 3469

## 2023-08-20 NOTE — ED NOTES
Patient states he got two large clumps out and he feels better then when he got here but he does not feel back to normal. He states he wants to keep things going     Raphael Lindsey RN  08/20/23 8171

## 2023-08-20 NOTE — ED NOTES
After talking with the patient it was found out that he only orders fast food because he has difficulty with mobility, patient states he does not eat a lot of vegetables and his kidney failure complicates the things that he can eat, patient educated on metamucil and which fruits and vegetables were appropriate for his condition, there was a heavy emphasis on fiber and how to intake fiber when there is not a lot in his diet.      Evelyn Stringer RN  08/20/23 0751

## 2023-08-20 NOTE — ED PROVIDER NOTES
[address], [address] [facility]      Hours: 24-hours Phone: [phone]   linaclotide 145 MCG capsule          Follow up:  [facility] EMERGENCY DEPT  [address] [facility] [address]  50 Ivan Mendez MD  [address] [facility]  [address]  [address] [place] [address]  [phone]             Disclaimers   I was the first provider for this patient on this visit. To the best of my ability I reviewed relevant prior medical records, electrocardiograms, laboratories, and radiologic studies. The patient's presenting problems were discussed, and the patient was in agreement with the care plan formulated and outlined with them. Please note that this dictation was completed with Dragon voice recognition software. Quite often unanticipated grammatical, syntax, homophones, and other interpretive errors are inadvertently transcribed by the computer software. Please disregard these errors and excuse any errors that have escaped final proofreading. Jacquelyn Combs MD    I personally performed the services described in this documentation on this date [unfilled] for patient Shalom Conner.       Jacquelyn Combs MD  9:15 PM            Jacquelyn Combs MD  08/20/23 9847

## 2023-09-14 ENCOUNTER — HOSPITAL ENCOUNTER (OUTPATIENT)
Facility: HOSPITAL | Age: 48
Setting detail: OUTPATIENT SURGERY
Discharge: HOME | End: 2023-09-14
Attending: SURGERY | Admitting: SURGERY
Payer: MEDICARE

## 2023-09-14 ENCOUNTER — ANESTHESIA EVENT (OUTPATIENT)
Facility: HOSPITAL | Age: 48
End: 2023-09-14
Payer: MEDICARE

## 2023-09-14 ENCOUNTER — ANESTHESIA (OUTPATIENT)
Facility: HOSPITAL | Age: 48
End: 2023-09-14
Payer: MEDICARE

## 2023-09-14 VITALS
WEIGHT: 112.43 LBS | HEART RATE: 87 BPM | SYSTOLIC BLOOD PRESSURE: 103 MMHG | TEMPERATURE: 98 F | RESPIRATION RATE: 17 BRPM | HEIGHT: 67 IN | OXYGEN SATURATION: 95 % | DIASTOLIC BLOOD PRESSURE: 74 MMHG | BODY MASS INDEX: 17.65 KG/M2

## 2023-09-14 DIAGNOSIS — N18.6 ESRD (END STAGE RENAL DISEASE) (HCC): Primary | ICD-10-CM

## 2023-09-14 LAB
ACT BLD: 257 SECS (ref 79–138)
ANION GAP BLD CALC-SCNC: 9 MMOL/L (ref 10–20)
CA-I BLD-MCNC: 1.19 MMOL/L (ref 1.12–1.32)
CHLORIDE BLD-SCNC: 94 MMOL/L (ref 98–107)
CO2 BLD-SCNC: 28.1 MMOL/L (ref 21–32)
CREAT BLD-MCNC: 6.25 MG/DL (ref 0.6–1.3)
GLUCOSE BLD-MCNC: 86 MG/DL (ref 65–100)
HGB BLD-MCNC: 9 G/DL (ref 12.1–17)
POTASSIUM BLD-SCNC: 3.5 MMOL/L (ref 3.5–5.1)
SERVICE CMNT-IMP: ABNORMAL
SODIUM BLD-SCNC: 130 MMOL/L (ref 136–145)

## 2023-09-14 PROCEDURE — 6370000000 HC RX 637 (ALT 250 FOR IP): Performed by: SURGERY

## 2023-09-14 PROCEDURE — 7100000010 HC PHASE II RECOVERY - FIRST 15 MIN: Performed by: SURGERY

## 2023-09-14 PROCEDURE — 3600000013 HC SURGERY LEVEL 3 ADDTL 15MIN: Performed by: SURGERY

## 2023-09-14 PROCEDURE — 2709999900 HC NON-CHARGEABLE SUPPLY: Performed by: SURGERY

## 2023-09-14 PROCEDURE — 7100000011 HC PHASE II RECOVERY - ADDTL 15 MIN: Performed by: SURGERY

## 2023-09-14 PROCEDURE — 6360000002 HC RX W HCPCS: Performed by: SURGERY

## 2023-09-14 PROCEDURE — 2580000003 HC RX 258: Performed by: SURGERY

## 2023-09-14 PROCEDURE — 2500000003 HC RX 250 WO HCPCS: Performed by: SURGERY

## 2023-09-14 PROCEDURE — 3600000003 HC SURGERY LEVEL 3 BASE: Performed by: SURGERY

## 2023-09-14 PROCEDURE — 2580000003 HC RX 258: Performed by: ANESTHESIOLOGY

## 2023-09-14 PROCEDURE — 6370000000 HC RX 637 (ALT 250 FOR IP): Performed by: ANESTHESIOLOGY

## 2023-09-14 PROCEDURE — 6360000002 HC RX W HCPCS: Performed by: NURSE ANESTHETIST, CERTIFIED REGISTERED

## 2023-09-14 PROCEDURE — 2580000003 HC RX 258: Performed by: NURSE ANESTHETIST, CERTIFIED REGISTERED

## 2023-09-14 PROCEDURE — 80047 BASIC METABLC PNL IONIZED CA: CPT

## 2023-09-14 PROCEDURE — 3700000000 HC ANESTHESIA ATTENDED CARE: Performed by: SURGERY

## 2023-09-14 PROCEDURE — 6360000002 HC RX W HCPCS: Performed by: ANESTHESIOLOGY

## 2023-09-14 PROCEDURE — 7100000000 HC PACU RECOVERY - FIRST 15 MIN: Performed by: SURGERY

## 2023-09-14 PROCEDURE — 7100000001 HC PACU RECOVERY - ADDTL 15 MIN: Performed by: SURGERY

## 2023-09-14 PROCEDURE — C1768 GRAFT, VASCULAR: HCPCS | Performed by: SURGERY

## 2023-09-14 PROCEDURE — 2500000003 HC RX 250 WO HCPCS: Performed by: ANESTHESIOLOGY

## 2023-09-14 PROCEDURE — 2720000010 HC SURG SUPPLY STERILE: Performed by: SURGERY

## 2023-09-14 PROCEDURE — 85018 HEMOGLOBIN: CPT

## 2023-09-14 PROCEDURE — 36556 INSERT NON-TUNNEL CV CATH: CPT

## 2023-09-14 PROCEDURE — 3700000001 HC ADD 15 MINUTES (ANESTHESIA): Performed by: SURGERY

## 2023-09-14 PROCEDURE — 85347 COAGULATION TIME ACTIVATED: CPT

## 2023-09-14 DEVICE — VG THIN WALL 7MM X 80CM LINED
Type: IMPLANTABLE DEVICE | Site: ARM | Status: FUNCTIONAL
Brand: GORE-TEX   VASCULAR GRAFT

## 2023-09-14 RX ORDER — SODIUM CHLORIDE 9 MG/ML
INJECTION, SOLUTION INTRAVENOUS PRN
Status: DISCONTINUED | OUTPATIENT
Start: 2023-09-14 | End: 2023-09-14 | Stop reason: HOSPADM

## 2023-09-14 RX ORDER — FENTANYL CITRATE 50 UG/ML
100 INJECTION, SOLUTION INTRAMUSCULAR; INTRAVENOUS
Status: DISCONTINUED | OUTPATIENT
Start: 2023-09-14 | End: 2023-09-14 | Stop reason: HOSPADM

## 2023-09-14 RX ORDER — SODIUM CHLORIDE 0.9 % (FLUSH) 0.9 %
5-40 SYRINGE (ML) INJECTION PRN
Status: DISCONTINUED | OUTPATIENT
Start: 2023-09-14 | End: 2023-09-14 | Stop reason: HOSPADM

## 2023-09-14 RX ORDER — SODIUM CHLORIDE 0.9 % (FLUSH) 0.9 %
5-40 SYRINGE (ML) INJECTION EVERY 12 HOURS SCHEDULED
Status: DISCONTINUED | OUTPATIENT
Start: 2023-09-14 | End: 2023-09-14 | Stop reason: HOSPADM

## 2023-09-14 RX ORDER — FENTANYL CITRATE 50 UG/ML
25 INJECTION, SOLUTION INTRAMUSCULAR; INTRAVENOUS EVERY 5 MIN PRN
Status: DISCONTINUED | OUTPATIENT
Start: 2023-09-14 | End: 2023-09-14 | Stop reason: HOSPADM

## 2023-09-14 RX ORDER — ACETAMINOPHEN 500 MG
1000 TABLET ORAL ONCE
Status: DISCONTINUED | OUTPATIENT
Start: 2023-09-14 | End: 2023-09-14 | Stop reason: HOSPADM

## 2023-09-14 RX ORDER — HYDRALAZINE HYDROCHLORIDE 20 MG/ML
10 INJECTION INTRAMUSCULAR; INTRAVENOUS
Status: DISCONTINUED | OUTPATIENT
Start: 2023-09-14 | End: 2023-09-14 | Stop reason: HOSPADM

## 2023-09-14 RX ORDER — MIDAZOLAM HYDROCHLORIDE 1 MG/ML
INJECTION INTRAMUSCULAR; INTRAVENOUS PRN
Status: DISCONTINUED | OUTPATIENT
Start: 2023-09-14 | End: 2023-09-14 | Stop reason: SDUPTHER

## 2023-09-14 RX ORDER — ONDANSETRON 2 MG/ML
4 INJECTION INTRAMUSCULAR; INTRAVENOUS ONCE
Status: COMPLETED | OUTPATIENT
Start: 2023-09-14 | End: 2023-09-14

## 2023-09-14 RX ORDER — OXYCODONE HYDROCHLORIDE 5 MG/1
10 TABLET ORAL PRN
Status: COMPLETED | OUTPATIENT
Start: 2023-09-14 | End: 2023-09-14

## 2023-09-14 RX ORDER — DIPHENHYDRAMINE HYDROCHLORIDE 50 MG/ML
12.5 INJECTION INTRAMUSCULAR; INTRAVENOUS
Status: DISCONTINUED | OUTPATIENT
Start: 2023-09-14 | End: 2023-09-14 | Stop reason: HOSPADM

## 2023-09-14 RX ORDER — ROPIVACAINE HYDROCHLORIDE 5 MG/ML
INJECTION, SOLUTION EPIDURAL; INFILTRATION; PERINEURAL
Status: COMPLETED | OUTPATIENT
Start: 2023-09-14 | End: 2023-09-14

## 2023-09-14 RX ORDER — OXYCODONE HYDROCHLORIDE 10 MG/1
10 TABLET ORAL EVERY 6 HOURS PRN
Qty: 20 TABLET | Refills: 0 | Status: SHIPPED | OUTPATIENT
Start: 2023-09-14 | End: 2023-09-19

## 2023-09-14 RX ORDER — MIDAZOLAM HYDROCHLORIDE 2 MG/2ML
2 INJECTION, SOLUTION INTRAMUSCULAR; INTRAVENOUS
Status: DISCONTINUED | OUTPATIENT
Start: 2023-09-14 | End: 2023-09-14 | Stop reason: HOSPADM

## 2023-09-14 RX ORDER — MIDAZOLAM HYDROCHLORIDE 1 MG/ML
2 INJECTION, SOLUTION INTRAMUSCULAR; INTRAVENOUS
Status: DISCONTINUED | OUTPATIENT
Start: 2023-09-14 | End: 2023-09-14 | Stop reason: HOSPADM

## 2023-09-14 RX ORDER — SODIUM CHLORIDE, SODIUM LACTATE, POTASSIUM CHLORIDE, CALCIUM CHLORIDE 600; 310; 30; 20 MG/100ML; MG/100ML; MG/100ML; MG/100ML
INJECTION, SOLUTION INTRAVENOUS CONTINUOUS
Status: DISCONTINUED | OUTPATIENT
Start: 2023-09-14 | End: 2023-09-14 | Stop reason: HOSPADM

## 2023-09-14 RX ORDER — OXYCODONE HYDROCHLORIDE 5 MG/1
5 TABLET ORAL PRN
Status: COMPLETED | OUTPATIENT
Start: 2023-09-14 | End: 2023-09-14

## 2023-09-14 RX ORDER — HYDROMORPHONE HYDROCHLORIDE 1 MG/ML
0.5 INJECTION, SOLUTION INTRAMUSCULAR; INTRAVENOUS; SUBCUTANEOUS EVERY 5 MIN PRN
Status: DISCONTINUED | OUTPATIENT
Start: 2023-09-14 | End: 2023-09-14 | Stop reason: HOSPADM

## 2023-09-14 RX ORDER — PROCHLORPERAZINE EDISYLATE 5 MG/ML
5 INJECTION INTRAMUSCULAR; INTRAVENOUS
Status: DISCONTINUED | OUTPATIENT
Start: 2023-09-14 | End: 2023-09-14 | Stop reason: HOSPADM

## 2023-09-14 RX ORDER — SODIUM CHLORIDE 9 MG/ML
INJECTION, SOLUTION INTRAVENOUS CONTINUOUS
Status: DISCONTINUED | OUTPATIENT
Start: 2023-09-14 | End: 2023-09-14 | Stop reason: HOSPADM

## 2023-09-14 RX ORDER — LIDOCAINE HYDROCHLORIDE 20 MG/ML
INJECTION, SOLUTION INFILTRATION; PERINEURAL
Status: COMPLETED | OUTPATIENT
Start: 2023-09-14 | End: 2023-09-14

## 2023-09-14 RX ORDER — DEXAMETHASONE SODIUM PHOSPHATE 4 MG/ML
4 INJECTION, SOLUTION INTRA-ARTICULAR; INTRALESIONAL; INTRAMUSCULAR; INTRAVENOUS; SOFT TISSUE
Status: DISCONTINUED | OUTPATIENT
Start: 2023-09-14 | End: 2023-09-14 | Stop reason: HOSPADM

## 2023-09-14 RX ORDER — LIDOCAINE HYDROCHLORIDE 10 MG/ML
INJECTION, SOLUTION INFILTRATION; PERINEURAL PRN
Status: DISCONTINUED | OUTPATIENT
Start: 2023-09-14 | End: 2023-09-14 | Stop reason: ALTCHOICE

## 2023-09-14 RX ADMIN — PHENYLEPHRINE HYDROCHLORIDE 30 MCG/MIN: 10 INJECTION INTRAVENOUS at 09:59

## 2023-09-14 RX ADMIN — VANCOMYCIN HYDROCHLORIDE 1000 MG: 1 INJECTION, POWDER, LYOPHILIZED, FOR SOLUTION INTRAVENOUS at 08:22

## 2023-09-14 RX ADMIN — SODIUM CHLORIDE: 9 INJECTION, SOLUTION INTRAVENOUS at 07:38

## 2023-09-14 RX ADMIN — MIDAZOLAM HYDROCHLORIDE 2 MG: 1 INJECTION, SOLUTION INTRAMUSCULAR; INTRAVENOUS at 08:35

## 2023-09-14 RX ADMIN — WATER 2000 MG: 1 INJECTION INTRAMUSCULAR; INTRAVENOUS; SUBCUTANEOUS at 09:52

## 2023-09-14 RX ADMIN — LIDOCAINE HYDROCHLORIDE 20 ML: 20 INJECTION, SOLUTION INFILTRATION; PERINEURAL at 08:45

## 2023-09-14 RX ADMIN — SODIUM CHLORIDE: 9 INJECTION, SOLUTION INTRAVENOUS at 11:19

## 2023-09-14 RX ADMIN — MIDAZOLAM HYDROCHLORIDE 3 MG: 1 INJECTION, SOLUTION INTRAMUSCULAR; INTRAVENOUS at 08:31

## 2023-09-14 RX ADMIN — ROPIVACAINE HYDROCHLORIDE 20 ML: 5 INJECTION, SOLUTION EPIDURAL; INFILTRATION; PERINEURAL at 08:45

## 2023-09-14 RX ADMIN — ONDANSETRON HYDROCHLORIDE 4 MG: 2 INJECTION, SOLUTION INTRAMUSCULAR; INTRAVENOUS at 11:22

## 2023-09-14 RX ADMIN — OXYCODONE HYDROCHLORIDE 5 MG: 5 TABLET ORAL at 12:58

## 2023-09-14 RX ADMIN — BIVALIRUDIN 1.75 MG/KG/HR: 250 INJECTION, POWDER, LYOPHILIZED, FOR SOLUTION INTRAVENOUS at 10:24

## 2023-09-14 RX ADMIN — Medication 3 AMPULE: at 06:43

## 2023-09-14 RX ADMIN — PROPOFOL 50 MCG/KG/MIN: 10 INJECTION, EMULSION INTRAVENOUS at 09:36

## 2023-09-14 ASSESSMENT — PAIN DESCRIPTION - LOCATION: LOCATION: ARM

## 2023-09-14 ASSESSMENT — PAIN SCALES - GENERAL: PAINLEVEL_OUTOF10: 4

## 2023-09-14 ASSESSMENT — PAIN DESCRIPTION - DESCRIPTORS: DESCRIPTORS: ACHING

## 2023-09-14 ASSESSMENT — PAIN - FUNCTIONAL ASSESSMENT: PAIN_FUNCTIONAL_ASSESSMENT: NONE - DENIES PAIN

## 2023-09-14 ASSESSMENT — PAIN DESCRIPTION - ORIENTATION: ORIENTATION: LEFT

## 2023-09-14 NOTE — BRIEF OP NOTE
Brief Postoperative Note      Patient: Xiao Bhatt  YOB: 1975  MRN: [id number]    Date of Procedure: 9/14/2023    Pre-Op Diagnosis Codes:     * End stage renal disease ([address]) [N18.6]    Post-Op Diagnosis: Same       Procedure(s):  LEFT ARM ARTERIO VENOUS GRAFT (AXILLARY BLOCK)    Surgeon(s): Jamal Martinez MD    Assistant:  * No surgical staff found *    Anesthesia: Monitor Anesthesia Care    Estimated Blood Loss (mL): less than 50     Complications: None    Specimens:   * No specimens in log *    Implants:  Implant Name Type Inv. Item Serial No.  Lot No. LRB No. Used Action   GRAFT VASC STR THN WALL N RNGD 7MM JORDY 80CM SAM GORTX - [id number] Vascular grafts GRAFT VASC STR THN WALL N RNGD 7MM JORDY 80CM SAM GORTX [id number]  [employer]WD NA Left 1 Implanted         Drains: * No LDAs found *    Findings: Old AVG chronically occluded so new AVG placed. Loop graft. Arterial is lateral.  Good artery and vein. Good thrill.       Electronically signed by Jason Walters MD on 9/14/2023 at 11:39 AM

## 2023-09-14 NOTE — PERIOP NOTE
8673 - PT DENIES FEVER, COLD, COUGH, SOB, N/V, DIARRHEA. .... PRE-OP TCHING DONE - PT VERBALIZES UNDERSTANDING. STRETCHER IN LOWEST POSITION, CB IN PLACE AND SR UP X2.    0830 - TIMEOUT DONE - DR. LOPEZ AT BEDSIDE TO PLACE LEFT ARM BLOCK. PT MANGO WELL.  VSS.

## 2023-09-14 NOTE — ANESTHESIA PROCEDURE NOTES
Peripheral Block    Patient location during procedure: pre-op  Reason for block: primary anesthetic  Start time: 9/14/2023 8:31 AM  End time: 9/14/2023 8:50 AM  Staffing  Performed: anesthesiologist   Anesthesiologist: Dana Aguilar MD  Performed by: Dana Aguilar MD  Authorized by: Dana Aguilar MD    Preanesthetic Checklist  Completed: patient identified, IV checked, site marked, risks and benefits discussed, surgical/procedural consents, equipment checked, pre-op evaluation, timeout performed, anesthesia consent given, oxygen available, monitors applied/VS acknowledged, fire risk safety assessment completed and verbalized and blood product R/B/A discussed and consented  Peripheral Block   Patient position: supine  Prep: ChloraPrep  Provider prep: sterile gloves and mask  Patient monitoring: cardiac monitor, continuous pulse ox, frequent blood pressure checks, IV access and oxygen  Block type: Brachial plexus  Laterality: left  Injection technique: single-shot  Guidance: nerve stimulator    Needle   Needle localization: nerve stimulator  Assessment   Injection assessment: negative aspiration for heme and no paresthesia on injection  Slow fractionated injection: yes  Hemodynamics: stable  Outcomes: uncomplicated and patient tolerated procedure well    Medications Administered  ropivacaine (NAROPIN) injection 0.5% - Perineural   20 mL - 9/14/2023 8:45:00 AM  lidocaine injection 2% - Perineural   20 mL - 9/14/2023 8:45:00 AM

## 2023-09-18 NOTE — OP NOTE
[facility]  OPERATIVE REPORT    Name:  Almon Bosworth  MR#:  [id number]  :  1975  ACCOUNT #:  [de-identified]  DATE OF SERVICE:  2023    PREOPERATIVE DIAGNOSIS:  End-stage renal disease. POSTOPERATIVE DIAGNOSIS:  End-stage renal disease. PROCEDURE PERFORMED:  Left arm arteriovenous graft. SURGEON:  Fany Damian MD    ASSISTANT:  None. ANESTHESIA:  Block. COMPLICATIONS:  None. SPECIMENS REMOVED:  None. IMPLANTS:  7 mm Beaumont-Danny graft. ESTIMATED BLOOD LOSS:  Less than 50 mL. DRAINS:  None. INDICATIONS:  The patient is a 82-year-old male with multiple medical problems including end-stage renal disease. He has an occluded left upper arm graft was repeated thrombosis and this was not felt to be salvageable. He currently is using a femoral tunneled catheter. He presents for placement of a new left arm graft. He has a history of heparin-induced thrombocytopenia. PROCEDURE:  After informed consent was obtained, the patient was given preoperative intravenous antibiotics. He was taken to the operating room with a satisfactory left upper extremity block in place. After establishing adequate sedation, the left arm was prepped and draped as a sterile field. An incision was made in the proximal medial arm and dissection was carried down to the brachial artery and vein proximal to the old graft and the existing venous outflow stents. The brachial artery and vein were both good vessels and they were isolated and encircled proximally and distally with vessel loops. The patient was anticoagulated with bivalve routine for the remainder of the case. An end-to-side anastomosis was created between the brachial artery and a 7 mm Beaumont-Danny graft which did not have heparin bonding. This was done with running 5-0 Beaumont-Danny suture. Upon completion, there was a good pulse in the graft and the anastomosis was hemostatic.   With the assistance of three small counter

## 2023-09-19 ENCOUNTER — HOSPITAL ENCOUNTER (EMERGENCY)
Facility: HOSPITAL | Age: 48
Discharge: HOME | End: 2023-09-19
Attending: STUDENT IN AN ORGANIZED HEALTH CARE EDUCATION/TRAINING PROGRAM
Payer: MEDICARE

## 2023-09-19 VITALS
RESPIRATION RATE: 17 BRPM | OXYGEN SATURATION: 99 % | HEART RATE: 94 BPM | SYSTOLIC BLOOD PRESSURE: 104 MMHG | DIASTOLIC BLOOD PRESSURE: 70 MMHG | BODY MASS INDEX: 17.54 KG/M2 | WEIGHT: 112 LBS | TEMPERATURE: 97.9 F

## 2023-09-19 DIAGNOSIS — G89.18 POST-OPERATIVE PAIN: ICD-10-CM

## 2023-09-19 DIAGNOSIS — M79.89 LEFT ARM SWELLING: Primary | ICD-10-CM

## 2023-09-19 PROCEDURE — 99283 EMERGENCY DEPT VISIT LOW MDM: CPT

## 2023-09-19 PROCEDURE — 6370000000 HC RX 637 (ALT 250 FOR IP): Performed by: STUDENT IN AN ORGANIZED HEALTH CARE EDUCATION/TRAINING PROGRAM

## 2023-09-19 RX ORDER — OXYCODONE HYDROCHLORIDE 5 MG/1
10 TABLET ORAL ONCE
Status: COMPLETED | OUTPATIENT
Start: 2023-09-19 | End: 2023-09-19

## 2023-09-19 RX ADMIN — OXYCODONE HYDROCHLORIDE 10 MG: 5 TABLET ORAL at 21:47

## 2023-09-19 ASSESSMENT — PAIN DESCRIPTION - ORIENTATION: ORIENTATION: LEFT

## 2023-09-19 ASSESSMENT — PAIN DESCRIPTION - LOCATION: LOCATION: ARM

## 2023-09-19 ASSESSMENT — PAIN SCALES - GENERAL: PAINLEVEL_OUTOF10: 7

## 2023-09-22 NOTE — ED PROVIDER NOTES
[facility] EMERGENCY DEPT  EMERGENCY DEPARTMENT ENCOUNTER       Pt Name: Homa Snowden  MRN: [id number]  [address] [facility] 1975  Date of evaluation: 9/19/2023  Provider: Ronnie Barney MD   PCP: James Blackburn MD  Note Started: 10:56 PM EDT 9/21/23     CHIEF COMPLAINT       Chief Complaint   Patient presents with    Arm Pain     Patient arrives via wheelchair c/o L arm pain and swelling following a dialysis access procedure on Thursday. HISTORY OF PRESENT ILLNESS: 1 or more elements      History From: patient, History limited by: none     Homa Snowden is a 41 y.o. male presenting with left arm pain and swelling. Notes he had a vascular procedure done on his fistula a few days ago. Since then has had pain and swelling in the arm. Dialysis going from from his femoral line. Please See MDM for Additional Details of the HPI/PMH  Nursing Notes were all reviewed and agreed with or any disagreements were addressed in the HPI. REVIEW OF SYSTEMS        Positives and Pertinent negatives as per HPI.     PAST HISTORY     Past Medical History:  Past Medical History:   Diagnosis Date    AICD (automatic cardioverter/defibrillator) present     CAD (coronary artery disease)     anterior MI s/p 2 stents  2007    Constipation     DVT (deep venous thrombosis) ([address]) 2001    DVT of popliteal vein ([address]) 11/2011    not anticoagulated due to bleeding, IVC filter    Endocarditis     ESRD on dialysis ([address])     [address] [facility] home x5 days week M-F does labs    Gallstone pancreatitis     Gastrointestinal disorder     peptic ulcer    Gastrointestinal disorder     acid reflux    High cholesterol     HIT (heparin-induced thrombocytopenia) (HCC)     Hx of blood clots 2012    left leg    Hypertension     Other ill-defined conditions(799.89)     hx recurrent left leg DVT    Other ill-defined conditions(799.89)     kidny transplant x2,  on dialysis    Peritonitis ([address])     Seizures ([address]) 2015    most recent- only had

## 2023-10-20 ENCOUNTER — HOSPITAL ENCOUNTER (EMERGENCY)
Facility: HOSPITAL | Age: 48
Discharge: HOME | End: 2023-10-20
Payer: MEDICARE

## 2023-10-20 ENCOUNTER — APPOINTMENT (OUTPATIENT)
Facility: HOSPITAL | Age: 48
End: 2023-10-20
Payer: MEDICARE

## 2023-10-20 VITALS
HEART RATE: 91 BPM | OXYGEN SATURATION: 98 % | SYSTOLIC BLOOD PRESSURE: 104 MMHG | DIASTOLIC BLOOD PRESSURE: 80 MMHG | WEIGHT: 119.71 LBS | TEMPERATURE: 99 F | RESPIRATION RATE: 18 BRPM | BODY MASS INDEX: 18.75 KG/M2

## 2023-10-20 DIAGNOSIS — R10.32 LEFT LOWER QUADRANT ABDOMINAL PAIN: Primary | ICD-10-CM

## 2023-10-20 LAB
ALBUMIN SERPL-MCNC: 3.1 G/DL (ref 3.5–5)
ALBUMIN/GLOB SERPL: 0.7 (ref 1.1–2.2)
ALP SERPL-CCNC: 286 U/L (ref 45–117)
ALT SERPL-CCNC: 9 U/L (ref 12–78)
ANION GAP SERPL CALC-SCNC: 8 MMOL/L (ref 5–15)
AST SERPL-CCNC: 13 U/L (ref 15–37)
BASOPHILS # BLD: 0.1 K/UL (ref 0–0.1)
BASOPHILS NFR BLD: 1 % (ref 0–1)
BILIRUB SERPL-MCNC: 0.4 MG/DL (ref 0.2–1)
BUN SERPL-MCNC: 57 MG/DL (ref 6–20)
BUN/CREAT SERPL: 7 (ref 12–20)
CALCIUM SERPL-MCNC: 9 MG/DL (ref 8.5–10.1)
CHLORIDE SERPL-SCNC: 100 MMOL/L (ref 97–108)
CO2 SERPL-SCNC: 26 MMOL/L (ref 21–32)
COMMENT:: NORMAL
CREAT SERPL-MCNC: 8.09 MG/DL (ref 0.7–1.3)
DIFFERENTIAL METHOD BLD: ABNORMAL
EOSINOPHIL # BLD: 0.3 K/UL (ref 0–0.4)
EOSINOPHIL NFR BLD: 3 % (ref 0–7)
ERYTHROCYTE [DISTWIDTH] IN BLOOD BY AUTOMATED COUNT: 17.4 % (ref 11.5–14.5)
GLOBULIN SER CALC-MCNC: 4.4 G/DL (ref 2–4)
GLUCOSE SERPL-MCNC: 86 MG/DL (ref 65–100)
HCT VFR BLD AUTO: 30.1 % (ref 36.6–50.3)
HGB BLD-MCNC: 9.3 G/DL (ref 12.1–17)
IMM GRANULOCYTES # BLD AUTO: 0 K/UL (ref 0–0.04)
IMM GRANULOCYTES NFR BLD AUTO: 0 % (ref 0–0.5)
LIPASE SERPL-CCNC: 64 U/L (ref 13–75)
LYMPHOCYTES # BLD: 0.8 K/UL (ref 0.8–3.5)
LYMPHOCYTES NFR BLD: 9 % (ref 12–49)
MCH RBC QN AUTO: 29.6 PG (ref 26–34)
MCHC RBC AUTO-ENTMCNC: 30.9 G/DL (ref 30–36.5)
MCV RBC AUTO: 95.9 FL (ref 80–99)
MONOCYTES # BLD: 1 K/UL (ref 0–1)
MONOCYTES NFR BLD: 12 % (ref 5–13)
NEUTS SEG # BLD: 6.4 K/UL (ref 1.8–8)
NEUTS SEG NFR BLD: 75 % (ref 32–75)
NRBC # BLD: 0 K/UL (ref 0–0.01)
NRBC BLD-RTO: 0 PER 100 WBC
PLATELET # BLD AUTO: 171 K/UL (ref 150–400)
PMV BLD AUTO: 10.6 FL (ref 8.9–12.9)
POTASSIUM SERPL-SCNC: 5.2 MMOL/L (ref 3.5–5.1)
PROT SERPL-MCNC: 7.5 G/DL (ref 6.4–8.2)
RBC # BLD AUTO: 3.14 M/UL (ref 4.1–5.7)
RBC MORPH BLD: ABNORMAL
SODIUM SERPL-SCNC: 134 MMOL/L (ref 136–145)
SPECIMEN HOLD: NORMAL
WBC # BLD AUTO: 8.6 K/UL (ref 4.1–11.1)

## 2023-10-20 PROCEDURE — 96375 TX/PRO/DX INJ NEW DRUG ADDON: CPT

## 2023-10-20 PROCEDURE — 6360000002 HC RX W HCPCS

## 2023-10-20 PROCEDURE — 83690 ASSAY OF LIPASE: CPT

## 2023-10-20 PROCEDURE — 36415 COLL VENOUS BLD VENIPUNCTURE: CPT

## 2023-10-20 PROCEDURE — 74176 CT ABD & PELVIS W/O CONTRAST: CPT

## 2023-10-20 PROCEDURE — 99284 EMERGENCY DEPT VISIT MOD MDM: CPT

## 2023-10-20 PROCEDURE — 85025 COMPLETE CBC W/AUTO DIFF WBC: CPT

## 2023-10-20 PROCEDURE — 80053 COMPREHEN METABOLIC PANEL: CPT

## 2023-10-20 PROCEDURE — 96374 THER/PROPH/DIAG INJ IV PUSH: CPT

## 2023-10-20 RX ORDER — FENTANYL CITRATE 50 UG/ML
50 INJECTION, SOLUTION INTRAMUSCULAR; INTRAVENOUS
Status: COMPLETED | OUTPATIENT
Start: 2023-10-20 | End: 2023-10-20

## 2023-10-20 RX ORDER — ONDANSETRON 4 MG/1
4 TABLET, ORALLY DISINTEGRATING ORAL EVERY 8 HOURS PRN
Qty: 30 TABLET | Refills: 0 | Status: SHIPPED | OUTPATIENT
Start: 2023-10-20

## 2023-10-20 RX ORDER — ONDANSETRON 2 MG/ML
4 INJECTION INTRAMUSCULAR; INTRAVENOUS ONCE
Status: COMPLETED | OUTPATIENT
Start: 2023-10-20 | End: 2023-10-20

## 2023-10-20 RX ADMIN — ONDANSETRON 4 MG: 2 INJECTION INTRAMUSCULAR; INTRAVENOUS at 21:45

## 2023-10-20 RX ADMIN — FENTANYL CITRATE 50 MCG: 50 INJECTION, SOLUTION INTRAMUSCULAR; INTRAVENOUS at 21:45

## 2023-10-20 ASSESSMENT — PAIN SCALES - GENERAL
PAINLEVEL_OUTOF10: 8
PAINLEVEL_OUTOF10: 7

## 2023-10-21 NOTE — ED PROVIDER NOTES
Making, Pt Expectation of Test or Tx.):     Ruby Vogt is a 57 y.o. male with a history of ESRD on dialysis, GERD, hypertension, and CAD who presents to the ED today for complaints of left lower abdominal pain that started today and has caused nausea and vomiting. Pain started as a 2 out of 10 and is now an 8 out of 10. Patient used to have issues with chronic constipation, that has resolved since starting Linzess almost 2 months ago. Last bowel movement was yesterday. Patient is anuric. Chronically ill-appearing 72-year-old -American male lying in bed in no acute distress. Appears older than stated age. Patient has tenderness to palpation to generalized abdomen, but is worse in the left lower quadrant. No masses or bruits. Ddx: Constipation, diverticulitis, bowel obstruction, pancreatitis. Ordered CBC, CMP, lipase, CT abdomen pelvis without contrast, fentanyl IV, and Zofran IV. CMP showed elevated potassium at 5.2 and elevated creatinine at 8.09, which is unsurprising with patient history of dialysis. Patient states he will be dialyzed tonight when he gets home. CBC and lipase unremarkable. Pain mildly improved after pain medicines. CT scan shows the following:  \"1. No acute abdominal or pelvic pathology. 2. Chronic complex right pleural fluid collection, similar in volume, with  Overlying atelectasis. 3. Multiple additional chronic and stable findings, including severely atrophic  native kidneys and atrophic, calcified bilateral renal transplants. \"  Patient requesting prescription for pain medicine. Check PDMP and patient should be getting prescription for chronic pain for oxycodone within the next couple days for 1 month supply. I do not feel comfortable writing additional pain medicine at this time. I did send a prescription for Zofran p.o. as needed, as patient has run out. Patient to follow-up with PCP and to return if symptoms worsen. FINAL IMPRESSION     1.  Left lower quadrant abdominal pain          DISPOSITION/PLAN   DISPOSITION Decision To Discharge 10/20/2023 10:59:53 PM      Discharge Note: The patient is stable for discharge home. The signs, symptoms, diagnosis, and discharge instructions have been discussed, understanding conveyed, and agreed upon. The patient is to follow up as recommended or return to ER should their symptoms worsen.       PATIENT REFERRED TO:  Millie Young MD  [address] [facility]  [phone]    Call on 10/23/2023      [facility] EMERGENCY DEPT  [address] [place] [address] [address]  [phone]    If symptoms worsen       DISCHARGE MEDICATIONS:     Medication List        CONTINUE taking these medications      ondansetron 4 MG disintegrating tablet  Commonly known as: ZOFRAN-ODT  Take 1 tablet by mouth every 8 hours as needed for Nausea or Vomiting            ASK your doctor about these medications      acetaminophen 500 MG tablet  Commonly known as: TYLENOL     aspirin 81 MG EC tablet     atorvastatin 20 MG tablet  Commonly known as: LIPITOR     calcitRIOL 0.5 MCG capsule  Commonly known as: ROCALTROL     calcium acetate 667 MG Caps capsule  Commonly known as: PHOSLO     dicyclomine 20 MG tablet  Commonly known as: BENTYL     ergocalciferol 1.25 MG (96081 UT) capsule  Commonly known as: ERGOCALCIFEROL     linaclotide 145 MCG capsule  Commonly known as: Linzess  Take 1 capsule by mouth every morning (before breakfast)     metoclopramide 5 MG tablet  Commonly known as: REGLAN     metoprolol succinate 25 MG extended release tablet  Commonly known as: TOPROL XL  Take 0.5 tablets by mouth daily     midodrine 5 MG tablet  Commonly known as: PROAMATINE  Take 1 tablet by mouth 3 times daily as needed (for sbp<=100)     oxyCODONE HCl 10 MG immediate release tablet  Commonly known as: OXY-IR     pantoprazole 40 MG tablet  Commonly known as: PROTONIX     polyethylene glycol 17 GM/SCOOP powder  Commonly known as:

## 2023-11-22 NOTE — PROGRESS NOTES
Pharmacy Automatic Renal Dosing Protocol - Antimicrobials    Indication for Antimicrobials: dialysis port infection     Current Regimen of Each Antimicrobial:  Vancomycin 500 mg IV after HD (Start Date 10/29; Day # 10)    Previous Antimicrobial Therapy:  None    Goal Level: VANCOMYCIN TROUGH GOAL RANGE  Vancomycin Trough: 20 - 25 mcg/mL  (AUC: 400 - 600 mg/hr/Liter/day)     Date Dose & Interval Measured (mcg/mL) Extrapolated (mcg/mL)   11/3  mg with HD 26    11 AM Random Level after HD 20      mg qHD 16.4      Date & time of next level: random level prior to Monday ()    Significant Cultures:   10/20 Blood MRSE 4/- final  10/29 Blood: NGTD x 4 days - Final  10/29 Wound: NGTD x 2 days- final    Radiology / Imaging results: (X-ray, CT scan or MRI): none    Paralysis, amputations, malnutrition: none    Labs:  Recent Labs     20  0748 20  0347 20  1300 20  0314   CREA 10.90*  --  9.56* 6.85*   BUN 36*  --  31* 21*   WBC  --  8.9  --  8.6     Temp (24hrs), Av.1 °F (36.7 °C), Min:97.5 °F (36.4 °C), Max:98.5 °F (36.9 °C)      Is the Patient on Dialysis? Yes    Creatinine Clearance (mL/min): HD    Impression/Plan:   Received requested trough prior to  HD= 16.4, adjust regimen to 750 mg qHD to maintain trough ~20  Antimicrobial stop date to be determined. Pharmacy will follow daily and adjust medications as appropriate for renal function and/or serum levels. Recommended duration of therapy  [email or web address]/Pharmacy/Clinical%[email or web address]. docx    Renal Dosing  [email or web address]/Pharmacy/[email or web address]/Renal%[email or web address]. pdf 141

## 2024-01-07 ENCOUNTER — HOSPITAL ENCOUNTER (EMERGENCY)
Facility: HOSPITAL | Age: 49
Discharge: HOME | End: 2024-01-07
Attending: EMERGENCY MEDICINE
Payer: MEDICARE

## 2024-01-07 ENCOUNTER — APPOINTMENT (OUTPATIENT)
Facility: HOSPITAL | Age: 49
End: 2024-01-07
Payer: MEDICARE

## 2024-01-07 VITALS
DIASTOLIC BLOOD PRESSURE: 84 MMHG | OXYGEN SATURATION: 95 % | RESPIRATION RATE: 18 BRPM | TEMPERATURE: 98.1 F | SYSTOLIC BLOOD PRESSURE: 116 MMHG | HEART RATE: 94 BPM

## 2024-01-07 DIAGNOSIS — J40 BRONCHITIS: Primary | ICD-10-CM

## 2024-01-07 LAB
ALBUMIN SERPL-MCNC: 3.4 G/DL (ref 3.5–5)
ALBUMIN/GLOB SERPL: 0.7 (ref 1.1–2.2)
ALP SERPL-CCNC: 340 U/L (ref 45–117)
ALT SERPL-CCNC: 7 U/L (ref 12–78)
ANION GAP SERPL CALC-SCNC: 8 MMOL/L (ref 5–15)
AST SERPL-CCNC: 17 U/L (ref 15–37)
BASOPHILS # BLD: 0.1 K/UL (ref 0–0.1)
BASOPHILS NFR BLD: 1 % (ref 0–1)
BILIRUB SERPL-MCNC: 0.6 MG/DL (ref 0.2–1)
BUN SERPL-MCNC: 71 MG/DL (ref 6–20)
BUN/CREAT SERPL: 9 (ref 12–20)
CALCIUM SERPL-MCNC: 8.8 MG/DL (ref 8.5–10.1)
CHLORIDE SERPL-SCNC: 96 MMOL/L (ref 97–108)
CO2 SERPL-SCNC: 26 MMOL/L (ref 21–32)
CREAT SERPL-MCNC: 8.05 MG/DL (ref 0.7–1.3)
DIFFERENTIAL METHOD BLD: ABNORMAL
EOSINOPHIL # BLD: 0.2 K/UL (ref 0–0.4)
EOSINOPHIL NFR BLD: 2 % (ref 0–7)
ERYTHROCYTE [DISTWIDTH] IN BLOOD BY AUTOMATED COUNT: 17.2 % (ref 11.5–14.5)
FLUAV AG NPH QL IA: NEGATIVE
FLUBV AG NOSE QL IA: NEGATIVE
GLOBULIN SER CALC-MCNC: 4.7 G/DL (ref 2–4)
GLUCOSE SERPL-MCNC: 70 MG/DL (ref 65–100)
HCT VFR BLD AUTO: 32.2 % (ref 36.6–50.3)
HGB BLD-MCNC: 10.5 G/DL (ref 12.1–17)
IMM GRANULOCYTES # BLD AUTO: 0 K/UL (ref 0–0.04)
IMM GRANULOCYTES NFR BLD AUTO: 0 % (ref 0–0.5)
LYMPHOCYTES # BLD: 1 K/UL (ref 0.8–3.5)
LYMPHOCYTES NFR BLD: 8 % (ref 12–49)
MAGNESIUM SERPL-MCNC: 2.2 MG/DL (ref 1.6–2.4)
MCH RBC QN AUTO: 30.1 PG (ref 26–34)
MCHC RBC AUTO-ENTMCNC: 32.6 G/DL (ref 30–36.5)
MCV RBC AUTO: 92.3 FL (ref 80–99)
MONOCYTES # BLD: 1 K/UL (ref 0–1)
MONOCYTES NFR BLD: 8 % (ref 5–13)
NEUTS SEG # BLD: 10.8 K/UL (ref 1.8–8)
NEUTS SEG NFR BLD: 81 % (ref 32–75)
NRBC # BLD: 0 K/UL (ref 0–0.01)
NRBC BLD-RTO: 0 PER 100 WBC
PLATELET # BLD AUTO: 204 K/UL (ref 150–400)
PMV BLD AUTO: 11 FL (ref 8.9–12.9)
POTASSIUM SERPL-SCNC: 4.2 MMOL/L (ref 3.5–5.1)
PROT SERPL-MCNC: 8.1 G/DL (ref 6.4–8.2)
RBC # BLD AUTO: 3.49 M/UL (ref 4.1–5.7)
SARS-COV-2 RDRP RESP QL NAA+PROBE: NOT DETECTED
SODIUM SERPL-SCNC: 130 MMOL/L (ref 136–145)
SOURCE: NORMAL
WBC # BLD AUTO: 13.1 K/UL (ref 4.1–11.1)

## 2024-01-07 PROCEDURE — 71045 X-RAY EXAM CHEST 1 VIEW: CPT

## 2024-01-07 PROCEDURE — 87635 SARS-COV-2 COVID-19 AMP PRB: CPT

## 2024-01-07 PROCEDURE — 36415 COLL VENOUS BLD VENIPUNCTURE: CPT

## 2024-01-07 PROCEDURE — 85025 COMPLETE CBC W/AUTO DIFF WBC: CPT

## 2024-01-07 PROCEDURE — 99285 EMERGENCY DEPT VISIT HI MDM: CPT

## 2024-01-07 PROCEDURE — 87804 INFLUENZA ASSAY W/OPTIC: CPT

## 2024-01-07 PROCEDURE — 80053 COMPREHEN METABOLIC PANEL: CPT

## 2024-01-07 PROCEDURE — 83735 ASSAY OF MAGNESIUM: CPT

## 2024-01-07 PROCEDURE — 93005 ELECTROCARDIOGRAM TRACING: CPT | Performed by: EMERGENCY MEDICINE

## 2024-01-07 RX ORDER — ALBUTEROL SULFATE 90 UG/1
2 AEROSOL, METERED RESPIRATORY (INHALATION) 4 TIMES DAILY PRN
Qty: 18 G | Refills: 0 | Status: SHIPPED | OUTPATIENT
Start: 2024-01-07

## 2024-01-07 RX ORDER — BENZONATATE 100 MG/1
100-200 CAPSULE ORAL 3 TIMES DAILY PRN
Qty: 60 CAPSULE | Refills: 0 | Status: SHIPPED | OUTPATIENT
Start: 2024-01-07 | End: 2024-01-14

## 2024-01-07 RX ORDER — AZITHROMYCIN 250 MG/1
250 TABLET, FILM COATED ORAL SEE ADMIN INSTRUCTIONS
Qty: 6 TABLET | Refills: 0 | Status: SHIPPED | OUTPATIENT
Start: 2024-01-07 | End: 2024-01-12

## 2024-01-07 ASSESSMENT — PAIN - FUNCTIONAL ASSESSMENT: PAIN_FUNCTIONAL_ASSESSMENT: NONE - DENIES PAIN

## 2024-01-07 NOTE — ED PROVIDER NOTES
takes less than 2 seconds.   Neurological:      General: No focal deficit present.      Mental Status: He is alert and oriented to person, place, and time.   Psychiatric:         Mood and Affect: Mood normal.         Behavior: Behavior normal.         Thought Content: Thought content normal.         Judgment: Judgment normal.           SCREENINGS                   LAB, EKG AND DIAGNOSTIC RESULTS   Labs:  Recent Results (from the past 12 hour(s))   EKG 12 Lead    Collection Time: 01/07/24  4:02 PM   Result Value Ref Range    Ventricular Rate 96 BPM    Atrial Rate 96 BPM    P-R Interval 152 ms    QRS Duration 88 ms    Q-T Interval 394 ms    QTc Calculation (Bazett) 497 ms    P Axis 85 degrees    R Axis 134 degrees    T Axis -13 degrees    Diagnosis       Normal sinus rhythm  Right axis deviation  Possible Right ventricular hypertrophy  ST & T wave abnormality, consider inferior ischemia  Prolonged QT  Abnormal ECG  When compared with ECG of 20-JUL-2023 23:26,  ST no longer depressed in Inferior leads  T wave inversion less evident in Inferior leads  T wave inversion no longer evident in Anterior leads     CBC with Auto Differential    Collection Time: 01/07/24  4:22 PM   Result Value Ref Range    WBC 13.1 (H) 4.1 - 11.1 K/uL    RBC 3.49 (L) 4.10 - 5.70 M/uL    Hemoglobin 10.5 (L) 12.1 - 17.0 g/dL    Hematocrit 32.2 (L) 36.6 - 50.3 %    MCV 92.3 80.0 - 99.0 FL    MCH 30.1 26.0 - 34.0 PG    MCHC 32.6 30.0 - 36.5 g/dL    RDW 17.2 (H) 11.5 - 14.5 %    Platelets 204 150 - 400 K/uL    MPV 11.0 8.9 - 12.9 FL    Nucleated RBCs 0.0 0  WBC    nRBC 0.00 0.00 - 0.01 K/uL    Neutrophils % 81 (H) 32 - 75 %    Lymphocytes % 8 (L) 12 - 49 %    Monocytes % 8 5 - 13 %    Eosinophils % 2 0 - 7 %    Basophils % 1 0 - 1 %    Immature Granulocytes 0 0.0 - 0.5 %    Neutrophils Absolute 10.8 (H) 1.8 - 8.0 K/UL    Lymphocytes Absolute 1.0 0.8 - 3.5 K/UL    Monocytes Absolute 1.0 0.0 - 1.0 K/UL    Eosinophils Absolute 0.2 0.0 - 0.4 K/UL

## 2024-01-09 LAB
EKG ATRIAL RATE: 96 BPM
EKG DIAGNOSIS: NORMAL
EKG P AXIS: 85 DEGREES
EKG P-R INTERVAL: 152 MS
EKG Q-T INTERVAL: 394 MS
EKG QRS DURATION: 88 MS
EKG QTC CALCULATION (BAZETT): 497 MS
EKG R AXIS: 134 DEGREES
EKG T AXIS: -13 DEGREES
EKG VENTRICULAR RATE: 96 BPM

## 2024-02-22 NOTE — PERIOP NOTES
TRANSFER - IN REPORT:    Verbal report received from Thais(name) on Miriam Mccord  being received from 2241(unit) for ordered procedure      Report consisted of patients Situation, Background, Assessment and   Recommendations(SBAR). Information from the following report(s) SBAR, Intake/Output, MAR, Recent Results and Cardiac Rhythm Sinus Tach was reviewed with the receiving nurse. Opportunity for questions and clarification was provided. Assessment completed upon patients arrival to unit and care assumed. None

## 2024-03-16 NOTE — PERIOP NOTES
TRANSFER - OUT REPORT:    Verbal report given to Daysi Botello on Samara Felix  being transferred to [facility], [facility] for routine post - op       Report consisted of patients Situation, Background, Assessment and   Recommendations(SBAR). Information from the following report(s) SBAR, Kardex, OR Summary, Intake/Output, MAR and Cardiac Rhythm NSR was reviewed with the receiving nurse. Lines:   Peripheral IV 03/31/21 Anterior;Right Forearm (Active)   Site Assessment Clean, dry, & intact 04/02/21 2030   Phlebitis Assessment 0 04/02/21 2030   Infiltration Assessment 0 04/02/21 2030   Dressing Status Clean, dry, & intact 04/02/21 2030   Dressing Type Tape;Transparent 04/02/21 2030   Hub Color/Line Status Blue; Infusing 04/02/21 2030   Action Taken Open ports on tubing capped 04/02/21 1727   Alcohol Cap Used Yes 04/02/21 1727        Opportunity for questions and clarification was provided.       Patient transported with:   O2 @ 2 liters  Registered Nurse [id number]

## 2024-09-19 ENCOUNTER — HOSPITAL ENCOUNTER (EMERGENCY)
Facility: HOSPITAL | Age: 49
Discharge: HOME | End: 2024-09-19
Payer: MEDICARE

## 2024-09-19 VITALS
OXYGEN SATURATION: 98 % | RESPIRATION RATE: 18 BRPM | BODY MASS INDEX: 18.68 KG/M2 | WEIGHT: 119.27 LBS | SYSTOLIC BLOOD PRESSURE: 122 MMHG | DIASTOLIC BLOOD PRESSURE: 82 MMHG | HEART RATE: 88 BPM | TEMPERATURE: 98.8 F

## 2024-09-19 DIAGNOSIS — K59.00 CONSTIPATION, UNSPECIFIED CONSTIPATION TYPE: Primary | ICD-10-CM

## 2024-09-19 PROCEDURE — 99284 EMERGENCY DEPT VISIT MOD MDM: CPT

## 2024-09-19 ASSESSMENT — PAIN SCALES - GENERAL: PAINLEVEL_OUTOF10: 4

## 2024-09-20 ENCOUNTER — HOSPITAL ENCOUNTER (EMERGENCY)
Facility: HOSPITAL | Age: 49
Discharge: HOME | End: 2024-09-21
Attending: EMERGENCY MEDICINE
Payer: MEDICARE

## 2024-09-20 DIAGNOSIS — R11.2 NAUSEA AND VOMITING, UNSPECIFIED VOMITING TYPE: ICD-10-CM

## 2024-09-20 DIAGNOSIS — R10.84 GENERALIZED ABDOMINAL PAIN: Primary | ICD-10-CM

## 2024-09-20 LAB
ALBUMIN SERPL-MCNC: 3.5 G/DL (ref 3.5–5)
ALBUMIN/GLOB SERPL: 0.7 (ref 1.1–2.2)
ALP SERPL-CCNC: 469 U/L (ref 45–117)
ALT SERPL-CCNC: 14 U/L (ref 12–78)
ANION GAP SERPL CALC-SCNC: 9 MMOL/L (ref 2–12)
AST SERPL-CCNC: 15 U/L (ref 15–37)
BASOPHILS # BLD: 0.1 K/UL (ref 0–0.1)
BASOPHILS NFR BLD: 1 % (ref 0–1)
BILIRUB SERPL-MCNC: 0.4 MG/DL (ref 0.2–1)
BUN SERPL-MCNC: 44 MG/DL (ref 6–20)
BUN/CREAT SERPL: 7 (ref 12–20)
CALCIUM SERPL-MCNC: 9.1 MG/DL (ref 8.5–10.1)
CHLORIDE SERPL-SCNC: 100 MMOL/L (ref 97–108)
CO2 SERPL-SCNC: 27 MMOL/L (ref 21–32)
CREAT SERPL-MCNC: 6.61 MG/DL (ref 0.7–1.3)
DIFFERENTIAL METHOD BLD: ABNORMAL
EOSINOPHIL # BLD: 0.7 K/UL (ref 0–0.4)
EOSINOPHIL NFR BLD: 5 % (ref 0–7)
ERYTHROCYTE [DISTWIDTH] IN BLOOD BY AUTOMATED COUNT: 14.9 % (ref 11.5–14.5)
GLOBULIN SER CALC-MCNC: 5.1 G/DL (ref 2–4)
GLUCOSE SERPL-MCNC: 78 MG/DL (ref 65–100)
HCT VFR BLD AUTO: 29.7 % (ref 36.6–50.3)
HGB BLD-MCNC: 9.4 G/DL (ref 12.1–17)
IMM GRANULOCYTES # BLD AUTO: 0 K/UL (ref 0–0.04)
IMM GRANULOCYTES NFR BLD AUTO: 0 % (ref 0–0.5)
LIPASE SERPL-CCNC: 60 U/L (ref 13–75)
LYMPHOCYTES # BLD: 2.2 K/UL (ref 0.8–3.5)
LYMPHOCYTES NFR BLD: 16 % (ref 12–49)
MCH RBC QN AUTO: 29.5 PG (ref 26–34)
MCHC RBC AUTO-ENTMCNC: 31.6 G/DL (ref 30–36.5)
MCV RBC AUTO: 93.1 FL (ref 80–99)
MONOCYTES # BLD: 1.2 K/UL (ref 0–1)
MONOCYTES NFR BLD: 9 % (ref 5–13)
NEUTS SEG # BLD: 9.3 K/UL (ref 1.8–8)
NEUTS SEG NFR BLD: 69 % (ref 32–75)
NRBC # BLD: 0 K/UL (ref 0–0.01)
NRBC BLD-RTO: 0 PER 100 WBC
PLATELET # BLD AUTO: 327 K/UL (ref 150–400)
PMV BLD AUTO: 10.8 FL (ref 8.9–12.9)
POTASSIUM SERPL-SCNC: 4.7 MMOL/L (ref 3.5–5.1)
PROT SERPL-MCNC: 8.6 G/DL (ref 6.4–8.2)
RBC # BLD AUTO: 3.19 M/UL (ref 4.1–5.7)
RBC MORPH BLD: ABNORMAL
SODIUM SERPL-SCNC: 136 MMOL/L (ref 136–145)
WBC # BLD AUTO: 13.5 K/UL (ref 4.1–11.1)

## 2024-09-20 PROCEDURE — 96374 THER/PROPH/DIAG INJ IV PUSH: CPT

## 2024-09-20 PROCEDURE — 83690 ASSAY OF LIPASE: CPT

## 2024-09-20 PROCEDURE — 85025 COMPLETE CBC W/AUTO DIFF WBC: CPT

## 2024-09-20 PROCEDURE — 6360000002 HC RX W HCPCS: Performed by: EMERGENCY MEDICINE

## 2024-09-20 PROCEDURE — 99284 EMERGENCY DEPT VISIT MOD MDM: CPT

## 2024-09-20 PROCEDURE — 96375 TX/PRO/DX INJ NEW DRUG ADDON: CPT

## 2024-09-20 PROCEDURE — 80053 COMPREHEN METABOLIC PANEL: CPT

## 2024-09-20 PROCEDURE — 36415 COLL VENOUS BLD VENIPUNCTURE: CPT

## 2024-09-20 RX ORDER — ONDANSETRON 2 MG/ML
4 INJECTION INTRAMUSCULAR; INTRAVENOUS ONCE
Status: COMPLETED | OUTPATIENT
Start: 2024-09-20 | End: 2024-09-20

## 2024-09-20 RX ORDER — FENTANYL CITRATE 50 UG/ML
100 INJECTION, SOLUTION INTRAMUSCULAR; INTRAVENOUS
Status: COMPLETED | OUTPATIENT
Start: 2024-09-20 | End: 2024-09-20

## 2024-09-20 RX ADMIN — ONDANSETRON 4 MG: 2 INJECTION INTRAMUSCULAR; INTRAVENOUS at 23:20

## 2024-09-20 RX ADMIN — FENTANYL CITRATE 100 MCG: 50 INJECTION INTRAMUSCULAR; INTRAVENOUS at 23:22

## 2024-09-20 ASSESSMENT — PAIN DESCRIPTION - DESCRIPTORS: DESCRIPTORS: ACHING

## 2024-09-20 ASSESSMENT — ENCOUNTER SYMPTOMS
NAUSEA: 1
VOMITING: 1
ABDOMINAL PAIN: 1
SHORTNESS OF BREATH: 0

## 2024-09-20 ASSESSMENT — PAIN SCALES - GENERAL
PAINLEVEL_OUTOF10: 7
PAINLEVEL_OUTOF10: 7

## 2024-09-20 ASSESSMENT — PAIN DESCRIPTION - LOCATION: LOCATION: ABDOMEN

## 2024-09-20 ASSESSMENT — PAIN - FUNCTIONAL ASSESSMENT: PAIN_FUNCTIONAL_ASSESSMENT: 0-10

## 2024-09-20 ASSESSMENT — PAIN DESCRIPTION - ORIENTATION: ORIENTATION: LEFT

## 2024-09-21 VITALS
HEART RATE: 96 BPM | TEMPERATURE: 99.3 F | HEIGHT: 67 IN | OXYGEN SATURATION: 95 % | BODY MASS INDEX: 18.93 KG/M2 | WEIGHT: 120.59 LBS | RESPIRATION RATE: 18 BRPM | SYSTOLIC BLOOD PRESSURE: 132 MMHG | DIASTOLIC BLOOD PRESSURE: 87 MMHG

## 2024-09-21 PROCEDURE — 6370000000 HC RX 637 (ALT 250 FOR IP): Performed by: EMERGENCY MEDICINE

## 2024-09-21 RX ORDER — DIPHENHYDRAMINE HCL 25 MG
25 CAPSULE ORAL
Status: COMPLETED | OUTPATIENT
Start: 2024-09-21 | End: 2024-09-21

## 2024-09-21 RX ORDER — ONDANSETRON 4 MG/1
4 TABLET, ORALLY DISINTEGRATING ORAL EVERY 8 HOURS PRN
Qty: 20 TABLET | Refills: 0 | Status: SHIPPED | OUTPATIENT
Start: 2024-09-21

## 2024-09-21 RX ADMIN — DIPHENHYDRAMINE HYDROCHLORIDE 25 MG: 25 CAPSULE ORAL at 00:33

## 2024-09-21 ASSESSMENT — PAIN DESCRIPTION - LOCATION: LOCATION: ABDOMEN

## 2024-09-21 ASSESSMENT — PAIN DESCRIPTION - ORIENTATION: ORIENTATION: LEFT

## 2024-09-21 ASSESSMENT — PAIN DESCRIPTION - DESCRIPTORS: DESCRIPTORS: ACHING

## 2024-09-21 ASSESSMENT — PAIN SCALES - GENERAL: PAINLEVEL_OUTOF10: 2

## 2024-10-16 NOTE — PROGRESS NOTES
Problem: Falls - Risk of  Goal: *Absence of Falls  Description: Document Candiceda Gamma Fall Risk and appropriate interventions in the flowsheet. Outcome: Progressing Towards Goal  Note: Fall Risk Interventions:  Mobility Interventions: Bed/chair exit alarm,Communicate number of staff needed for ambulation/transfer,Patient to call before getting OOB    Mentation Interventions: Adequate sleep, hydration, pain control,Bed/chair exit alarm,Update white board    Medication Interventions: Bed/chair exit alarm,Patient to call before getting OOB,Teach patient to arise slowly    Elimination Interventions: Bed/chair exit alarm,Call light in reach,Patient to call for help with toileting needs    History of Falls Interventions: Bed/chair exit alarm,Investigate reason for fall         Problem: Patient Education: Go to Patient Education Activity  Goal: Patient/Family Education  Outcome: Progressing Towards Goal     Problem: Pressure Injury - Risk of  Goal: *Prevention of pressure injury  Description: Document Yong Scale and appropriate interventions in the flowsheet.   Outcome: Progressing Towards Goal  Note: Pressure Injury Interventions:  Sensory Interventions: Assess changes in LOC,Assess need for specialty bed,Check visual cues for pain,Float heels,Keep linens dry and wrinkle-free    Moisture Interventions: Absorbent underpads,Apply protective barrier, creams and emollients,Check for incontinence Q2 hours and as needed    Activity Interventions: Increase time out of bed,Pressure redistribution bed/mattress(bed type)    Mobility Interventions: HOB 30 degrees or less,Pressure redistribution bed/mattress (bed type)    Nutrition Interventions: Document food/fluid/supplement intake    Friction and Shear Interventions: HOB 30 degrees or less,Lift sheet,Minimize layers                Problem: Patient Education: Go to Patient Education Activity  Goal: Patient/Family Education  Outcome: Progressing Towards Goal     Problem: Breathing Yes Pattern - Ineffective  Goal: Ability to achieve and maintain a regular respiratory rate  Outcome: Progressing Towards Goal     Problem: Isolation Precautions - Risk of Spread of Infection  Goal: Prevent transmission of infectious organism to others  Outcome: Progressing Towards Goal

## 2024-10-22 NOTE — ROUTINE PROCESS
End of Shift Note Bedside shift change report given to  (oncoming nurse) by Agustin Hyde RN (offgoing nurse). Report included the following information Kardex, Intake/Output, MAR, Recent Results and Cardiac Rhythm NSR ,ST Shift worked:7p-7a Shift summary and any significant changes: No   
 
  
Concerns for physician to address: 
No     
Zone phone for oncoming shift:     
 
Activity: 
Activity Level: Up with Assistance, Recliner Number times ambulated in hallways past shift: 0 Number of times OOB to chair past shift: 5 Cardiac:  
Cardiac Monitoring: Yes     
Cardiac Rhythm: Normal sinus rhythm Access:  
Current line(s): PIV and HD access Genitourinary:  
Urinary status: anuric Respiratory:  
O2 Device: Room air Chronic home O2 use?: NO Incentive spirometer at bedside: NO 
  
GI: 
Last Bowel Movement Date: 11/02/20 Current diet:  DIET RENAL Regular Passing flatus: YES Tolerating current diet: YES 
% Diet Eaten: (delivered [address]) Pain Management:  
Patient states pain is manageable on current regimen: YES Skin: 
Yong Score: 20 Interventions: increase time out of bed Patient Safety: 
Fall Score: Total Score: 1 Interventions: bed/chair alarm and pt to call before getting OOB Length of Stay: 
Expected LOS: 4d 19h Actual LOS: 10 
 
 
Agustin Hyde RN 
 
 
 
 
 
 
 
 
 
 
 
 
 
 
 October 22, 2024     Francisca Patrick MD  [address] [place] [address]  [facility] [address]    Patient: Radames Lujan   YOB: 1945   Date of Visit: 10/22/2024       Dear Francisca Patrick MD    Radames Lujan was in my office today. Below is a copy of my note.    If you have questions, please do not hesitate to call me. I look forward to following Radames along with you.         Sincerely,        AMADEO Ayala        CC: No Recipients    Problem list     Subjective  Radames Lujan is a 79 y.o. male     Chief Complaint   Patient presents with   • Yearly follow up     CAD   Problem list   1.  Coronary artery disease with history of stenting in 2012 in [place], inadequate data  2.  Diabetes mellitus  3.  Hypertension  4.  Dyslipidemia  5. Sleep Apnea  6.  Thrombocytopenia seeing Dr Hernan JAVIER    Patient is a 79-year-old male presenting to the office for routine cardiac assessment.  Patient is send remarkably well.  He has been on medication for diabetes and has lost a significant amount of weight.  Patient does not complain of any chest pain or pressure.  No shortness of breath.  No PND or orthopnea.    Patient does not describe palpitating nor does patient complain of dysrhythmic symptoms.  He is stable otherwise.      Current Outpatient Medications on File Prior to Visit   Medication Sig Dispense Refill   • albuterol (PROVENTIL HFA;VENTOLIN HFA) 108 (90 Base) MCG/ACT inhaler Inhale 2 puffs As Needed for Wheezing.     • amLODIPine (NORVASC) 5 MG tablet      • ascorbic acid (VITAMIN C) 500 MG tablet Take 1 tablet by mouth Daily.     • aspirin 81 MG tablet Take 1 tablet by mouth Daily.     • atorvastatin (LIPITOR) 80 MG tablet Take 1 tablet by mouth Daily.     • Cyanocobalamin (Vitamin B12) 1000 MCG tablet controlled-release Take  by mouth Daily.     • folic acid (FOLVITE) 400 MCG tablet Take 1 tablet by mouth Daily.     • lisinopril (PRINIVIL,ZESTRIL) 40 MG tablet Take 1 tablet by mouth  Daily.     • metoprolol succinate XL (TOPROL-XL) 50 MG 24 hr tablet Take 1 tablet by mouth Daily.     • Multiple Vitamins-Iron (MULTIVITAMIN/IRON PO) Take  by mouth Daily.     • nitroglycerin (NITROSTAT) 0.4 MG SL tablet Place 1 tablet under the tongue As Needed for Chest Pain. Take no more than 3 doses in 15 minutes. 25 tablet 0   • Omega-3 Fatty Acids (FISH OIL CONCENTRATE PO) Take  by mouth.     • Prenatal Vit-Fe Psac Cmplx-FA (POLY IRON PN PO) Take  by mouth Daily.     • Synjardy 12.5-1000 MG tablet      • Tirzepatide (MOUNJARO SC) Inject  under the skin into the appropriate area as directed.     • [DISCONTINUED] metFORMIN ER (GLUCOPHAGE-XR) 500 MG 24 hr tablet 2 tablets 2 (Two) Times a Day.       No current facility-administered medications on file prior to visit.       Patient has no known allergies.    Past Medical History:   Diagnosis Date   • Coronary artery disease    • Diabetes mellitus    • Hyperlipidemia    • Hypertension    • Low platelet count    • Sleep apnea        Social History     Socioeconomic History   • Marital status:    Tobacco Use   • Smoking status: Former     Current packs/day: 0.00     Average packs/day: 1.5 packs/day for 10.0 years (15.0 ttl pk-yrs)     Types: Cigarettes     Start date:      Quit date:      Years since quittin.8   • Smokeless tobacco: Never   Substance and Sexual Activity   • Alcohol use: No   • Drug use: No       Family History   Problem Relation Age of Onset   • Leukemia Mother    • Hypertension Mother    • Aneurysm Father    • Emphysema Father        Review of Systems   Constitutional: Negative.  Negative for activity change, appetite change, chills, fatigue and fever.   HENT: Negative.  Negative for congestion, sinus pressure and sinus pain.    Eyes: Negative.  Negative for visual disturbance.   Respiratory: Negative.  Negative for apnea, cough, chest tightness, shortness of breath and wheezing.    Cardiovascular: Negative.  Negative for chest  "pain, palpitations and leg swelling.   Gastrointestinal: Negative.  Negative for blood in stool.   Endocrine: Negative.  Negative for cold intolerance and heat intolerance.   Genitourinary: Negative.  Negative for hematuria.   Musculoskeletal: Negative.  Negative for gait problem.   Skin: Negative.  Negative for color change, rash and wound.   Allergic/Immunologic: Negative.  Negative for environmental allergies and food allergies.   Neurological:  Negative for dizziness, syncope, weakness, light-headedness, numbness and headaches.   Hematological: Negative.  Does not bruise/bleed easily.   Psychiatric/Behavioral: Negative.  Negative for sleep disturbance.        Objective  Vitals:    10/22/24 1015   BP: 139/92   BP Location: Right arm   Patient Position: Sitting   Cuff Size: Adult   Pulse: 76   Weight: 110 kg (242 lb)   Height: 170.2 cm (67\")      /92 (BP Location: Right arm, Patient Position: Sitting, Cuff Size: Adult)   Pulse 76   Ht 170.2 cm (67\")   Wt 110 kg (242 lb)   BMI 37.90 kg/m²     Lab Results (most recent)       None            Physical Exam  Vitals and nursing note reviewed.   Constitutional:       General: He is not in acute distress.     Appearance: Normal appearance. He is well-developed.   HENT:      Head: Normocephalic and atraumatic.   Eyes:      General: No scleral icterus.        Right eye: No discharge.         Left eye: No discharge.      Conjunctiva/sclera: Conjunctivae normal.   Neck:      Vascular: No carotid bruit.   Cardiovascular:      Rate and Rhythm: Normal rate and regular rhythm.      Heart sounds: Normal heart sounds. No murmur heard.     No friction rub. No gallop.   Pulmonary:      Effort: Pulmonary effort is normal. No respiratory distress.      Breath sounds: Normal breath sounds. No wheezing or rales.   Chest:      Chest wall: No tenderness.   Musculoskeletal:      Right lower leg: No edema.      Left lower leg: No edema.   Skin:     General: Skin is warm and dry.    "   Coloration: Skin is not pale.      Findings: No erythema or rash.   Neurological:      Mental Status: He is alert and oriented to person, place, and time.      Cranial Nerves: No cranial nerve deficit.   Psychiatric:         Behavior: Behavior normal.         Procedure  Procedures       Assessment & Plan    Problems Addressed this Visit          Cardiac and Vasculature    Essential hypertension    Relevant Medications    amLODIPine (NORVASC) 5 MG tablet    Coronary artery disease involving native coronary artery of native heart without angina pectoris - Primary    Relevant Medications    amLODIPine (NORVASC) 5 MG tablet    Pure hypercholesterolemia     Diagnoses         Codes Comments    Coronary artery disease involving native coronary artery of native heart without angina pectoris    -  Primary ICD-10-CM: I25.10  ICD-9-CM: 414.01     Essential hypertension     ICD-10-CM: I10  ICD-9-CM: 401.9     Pure hypercholesterolemia     ICD-10-CM: E78.00  ICD-9-CM: 272.0             Patient's  1.  Patient is a 79-year-old male presenting for routine assessment doing remarkably well.  He has known coronary disease and is stable.  Patient has no angina no anginal symptoms.    2.  We will continue antiplatelet therapy as well as statin therapy.  Patient has labs performed by primary.    3.  Patient's blood pressure currently stable.  We will make no changes.  I will see him back for follow-up in 6 months to year or sooner if needed.  Follow-up with primary as scheduled.         Patient did not bring med list or medicine bottles to appointment, med list has been reviewed and updated based on patient's knowledge of their meds.      Advance Care Planning  ACP discussion was declined by the patient. Patient has an advance directive (not in EMR), copy requested.        Electronically signed by:

## 2024-12-03 ENCOUNTER — APPOINTMENT (OUTPATIENT)
Facility: HOSPITAL | Age: 49
End: 2024-12-03
Payer: MEDICARE

## 2024-12-03 ENCOUNTER — HOSPITAL ENCOUNTER (EMERGENCY)
Facility: HOSPITAL | Age: 49
Discharge: HOME | End: 2024-12-04
Payer: MEDICARE

## 2024-12-03 DIAGNOSIS — K59.00 CONSTIPATION, UNSPECIFIED CONSTIPATION TYPE: Primary | ICD-10-CM

## 2024-12-03 LAB
ALBUMIN SERPL-MCNC: 3.5 G/DL (ref 3.5–5)
ALBUMIN/GLOB SERPL: 0.8 (ref 1.1–2.2)
ALP SERPL-CCNC: 655 U/L (ref 45–117)
ALT SERPL-CCNC: 12 U/L (ref 12–78)
ANION GAP SERPL CALC-SCNC: 11 MMOL/L (ref 2–12)
AST SERPL-CCNC: 46 U/L (ref 15–37)
BASOPHILS # BLD: 0.1 K/UL (ref 0–0.1)
BASOPHILS NFR BLD: 1 % (ref 0–1)
BILIRUB SERPL-MCNC: 0.6 MG/DL (ref 0.2–1)
BUN SERPL-MCNC: 48 MG/DL (ref 6–20)
BUN/CREAT SERPL: 5 (ref 12–20)
CALCIUM SERPL-MCNC: 8.8 MG/DL (ref 8.5–10.1)
CHLORIDE SERPL-SCNC: 98 MMOL/L (ref 97–108)
CO2 SERPL-SCNC: 22 MMOL/L (ref 21–32)
CREAT SERPL-MCNC: 9.51 MG/DL (ref 0.7–1.3)
DIFFERENTIAL METHOD BLD: ABNORMAL
EOSINOPHIL # BLD: 0.9 K/UL (ref 0–0.4)
EOSINOPHIL NFR BLD: 9 % (ref 0–7)
ERYTHROCYTE [DISTWIDTH] IN BLOOD BY AUTOMATED COUNT: 15.9 % (ref 11.5–14.5)
GLOBULIN SER CALC-MCNC: 4.6 G/DL (ref 2–4)
GLUCOSE SERPL-MCNC: 98 MG/DL (ref 65–100)
HCT VFR BLD AUTO: 32.2 % (ref 36.6–50.3)
HGB BLD-MCNC: 10.6 G/DL (ref 12.1–17)
IMM GRANULOCYTES # BLD AUTO: 0 K/UL (ref 0–0.04)
IMM GRANULOCYTES NFR BLD AUTO: 0 % (ref 0–0.5)
LACTATE BLD-SCNC: 1.32 MMOL/L (ref 0.4–2)
LIPASE SERPL-CCNC: 25 U/L (ref 13–75)
LYMPHOCYTES # BLD: 1.9 K/UL (ref 0.8–3.5)
LYMPHOCYTES NFR BLD: 20 % (ref 12–49)
MCH RBC QN AUTO: 30.5 PG (ref 26–34)
MCHC RBC AUTO-ENTMCNC: 32.9 G/DL (ref 30–36.5)
MCV RBC AUTO: 92.8 FL (ref 80–99)
MONOCYTES # BLD: 1.2 K/UL (ref 0–1)
MONOCYTES NFR BLD: 12 % (ref 5–13)
NEUTS SEG # BLD: 5.3 K/UL (ref 1.8–8)
NEUTS SEG NFR BLD: 58 % (ref 32–75)
NRBC # BLD: 0 K/UL (ref 0–0.01)
NRBC BLD-RTO: 0 PER 100 WBC
PLATELET # BLD AUTO: 233 K/UL (ref 150–400)
PMV BLD AUTO: 11.1 FL (ref 8.9–12.9)
POTASSIUM SERPL-SCNC: 5.8 MMOL/L (ref 3.5–5.1)
PROT SERPL-MCNC: 8.1 G/DL (ref 6.4–8.2)
RBC # BLD AUTO: 3.47 M/UL (ref 4.1–5.7)
SODIUM SERPL-SCNC: 131 MMOL/L (ref 136–145)
WBC # BLD AUTO: 9.4 K/UL (ref 4.1–11.1)

## 2024-12-03 PROCEDURE — 74176 CT ABD & PELVIS W/O CONTRAST: CPT

## 2024-12-03 PROCEDURE — 85025 COMPLETE CBC W/AUTO DIFF WBC: CPT

## 2024-12-03 PROCEDURE — 36415 COLL VENOUS BLD VENIPUNCTURE: CPT

## 2024-12-03 PROCEDURE — 99284 EMERGENCY DEPT VISIT MOD MDM: CPT

## 2024-12-03 PROCEDURE — 83605 ASSAY OF LACTIC ACID: CPT

## 2024-12-03 PROCEDURE — 83690 ASSAY OF LIPASE: CPT

## 2024-12-03 PROCEDURE — 71045 X-RAY EXAM CHEST 1 VIEW: CPT

## 2024-12-03 PROCEDURE — 6360000002 HC RX W HCPCS

## 2024-12-03 PROCEDURE — 2500000003 HC RX 250 WO HCPCS

## 2024-12-03 PROCEDURE — 96374 THER/PROPH/DIAG INJ IV PUSH: CPT

## 2024-12-03 PROCEDURE — 96375 TX/PRO/DX INJ NEW DRUG ADDON: CPT

## 2024-12-03 PROCEDURE — 80053 COMPREHEN METABOLIC PANEL: CPT

## 2024-12-03 RX ORDER — MORPHINE SULFATE 4 MG/ML
4 INJECTION, SOLUTION INTRAMUSCULAR; INTRAVENOUS
Status: DISCONTINUED | OUTPATIENT
Start: 2024-12-03 | End: 2024-12-03

## 2024-12-03 RX ORDER — POLYETHYLENE GLYCOL 3350 17 G/17G
17 POWDER, FOR SOLUTION ORAL DAILY PRN
Qty: 30 PACKET | Refills: 0 | Status: SHIPPED | OUTPATIENT
Start: 2024-12-03 | End: 2025-01-02

## 2024-12-03 RX ORDER — LACTULOSE 10 G/10G
20 SOLUTION ORAL ONCE
Qty: 2 PACKET | Refills: 0 | Status: SHIPPED | OUTPATIENT
Start: 2024-12-03 | End: 2024-12-03

## 2024-12-03 RX ORDER — ONDANSETRON 2 MG/ML
4 INJECTION INTRAMUSCULAR; INTRAVENOUS EVERY 6 HOURS PRN
Status: DISCONTINUED | OUTPATIENT
Start: 2024-12-03 | End: 2024-12-04 | Stop reason: HOSPADM

## 2024-12-03 RX ORDER — HYDROMORPHONE HYDROCHLORIDE 1 MG/ML
0.5 INJECTION, SOLUTION INTRAMUSCULAR; INTRAVENOUS; SUBCUTANEOUS
Status: COMPLETED | OUTPATIENT
Start: 2024-12-03 | End: 2024-12-03

## 2024-12-03 RX ADMIN — ONDANSETRON 4 MG: 2 INJECTION INTRAMUSCULAR; INTRAVENOUS at 22:49

## 2024-12-03 RX ADMIN — HYDROMORPHONE HYDROCHLORIDE 0.5 MG: 1 INJECTION, SOLUTION INTRAMUSCULAR; INTRAVENOUS; SUBCUTANEOUS at 22:48

## 2024-12-03 ASSESSMENT — PAIN - FUNCTIONAL ASSESSMENT: PAIN_FUNCTIONAL_ASSESSMENT: 0-10

## 2024-12-03 ASSESSMENT — PAIN DESCRIPTION - ORIENTATION: ORIENTATION: LEFT;LOWER

## 2024-12-03 ASSESSMENT — PAIN SCALES - GENERAL
PAINLEVEL_OUTOF10: 7
PAINLEVEL_OUTOF10: 7

## 2024-12-03 ASSESSMENT — PAIN DESCRIPTION - LOCATION: LOCATION: ABDOMEN

## 2024-12-04 VITALS
BODY MASS INDEX: 17.96 KG/M2 | RESPIRATION RATE: 18 BRPM | WEIGHT: 114.64 LBS | HEART RATE: 81 BPM | SYSTOLIC BLOOD PRESSURE: 124 MMHG | TEMPERATURE: 97.5 F | DIASTOLIC BLOOD PRESSURE: 85 MMHG | OXYGEN SATURATION: 94 %

## 2024-12-04 ASSESSMENT — ENCOUNTER SYMPTOMS
NAUSEA: 1
SHORTNESS OF BREATH: 0
ABDOMINAL PAIN: 1
VOMITING: 0
CONSTIPATION: 1

## 2024-12-04 NOTE — ED PROVIDER NOTES
drugs.  Prescription drug management.          Provider Notes (Medical Decision Making):   Patient is a 49-year-old male with past medical history significant for ESRD (on dialysis) presenting for abdominal pain.  On physical exam, vital signs are stable.  Patient has left lower quadrant pain to palpation.  Abdomen is otherwise soft and nondistended.  Differential diagnosis includes diverticulitis, constipation, bowel obstruction.  Here, will obtain basic labs, lipase, lactate.  Will obtain CT without contrast given patient has an anaphylaxis allergy to contrast.  Anticipate discharge for this patient.    ED Course:   Initial assessment performed. The patients presenting problems have been discussed, and they are in agreement with the care plan formulated and outlined with them.  I have encouraged them to ask questions as they arise throughout their visit.    ED Course as of 12/04/24 0012   Tue Dec 03, 2024   [id number] Patient states that he would like to leave. Will prescribe him lactulose and miralax and instruct him to do dialysis tonight  [AS]      ED Course User Index  [AS] Ange Brown, DO       Medications Administered         HYDROmorphone HCl PF (DILAUDID) injection 0.5 mg Admin Date  12/03/2024 Action  Given Dose  0.5 mg Route  IntraVENous Documented By  Paulette Alfredo, JOJO        ondansetron (ZOFRAN) injection 4 mg Admin Date  12/03/2024 Action  Given Dose  4 mg Route  IntraVENous Documented By  Paulette Alfredo, JOJO                Disposition:  Home     DISCHARGE PLAN:  1.      Medication List        START taking these medications      polyethylene glycol 17 g packet  Commonly known as: MiraLax  Take 1 packet by mouth daily as needed for Constipation            ASK your doctor about these medications      acetaminophen 500 MG tablet  Commonly known as: TYLENOL     albuterol sulfate  (90 Base) MCG/ACT inhaler  Commonly known as: Ventolin HFA  Inhale 2 puffs into the lungs 4 times daily as needed for

## 2024-12-04 NOTE — DISCHARGE INSTRUCTIONS
You were seen in the ER for abdominal pain. Your CT shows that you are constipated. I have prescribed you lactulose and miralax. Please return to the ER for any worsening abdominal pain, nausea, vomiting, if your condition worsens or you are concerned.

## 2024-12-06 ENCOUNTER — HOSPITAL ENCOUNTER (EMERGENCY)
Facility: HOSPITAL | Age: 49
Discharge: HOME | End: 2024-12-06
Attending: STUDENT IN AN ORGANIZED HEALTH CARE EDUCATION/TRAINING PROGRAM
Payer: MEDICARE

## 2024-12-06 VITALS
RESPIRATION RATE: 16 BRPM | TEMPERATURE: 98 F | SYSTOLIC BLOOD PRESSURE: 95 MMHG | HEART RATE: 93 BPM | HEIGHT: 67 IN | BODY MASS INDEX: 20.42 KG/M2 | DIASTOLIC BLOOD PRESSURE: 65 MMHG | WEIGHT: 130.07 LBS | OXYGEN SATURATION: 95 %

## 2024-12-06 DIAGNOSIS — K59.00 CONSTIPATION, UNSPECIFIED CONSTIPATION TYPE: Primary | ICD-10-CM

## 2024-12-06 PROCEDURE — 99282 EMERGENCY DEPT VISIT SF MDM: CPT

## 2024-12-06 ASSESSMENT — PAIN - FUNCTIONAL ASSESSMENT: PAIN_FUNCTIONAL_ASSESSMENT: NONE - DENIES PAIN

## 2024-12-06 ASSESSMENT — PAIN SCALES - GENERAL: PAINLEVEL_OUTOF10: 0

## 2024-12-06 NOTE — ED NOTES
Patient ambulatory to ED with constipation since Sunday. Patient states that this has occurred previously, and sometimes requires a manual disimpaction and an enema. Pt reports that the last occurrence was a few months ago, and that the provider was unable to perform a manual disimpaction due to the location of the stool. Staff administered a second enema which did relieve his symptoms. Patient denies any pain or nausea/vomiting at this time.

## 2024-12-06 NOTE — ED PROVIDER NOTES
[facility] EMERGENCY DEPT  EMERGENCY DEPARTMENT ENCOUNTER       Pt Name: Claude E Gary  MRN: [id number]  Birthdate 1975  Date of evaluation: 12/6/2024  Provider: Virgilio Arthur DO   PCP: Tramaine Rodas MD  Note Started: 7:48 AM 12/6/24     CHIEF COMPLAINT       Chief Complaint   Patient presents with    Constipation     Patient arrives ambulatory to triage with complaint of constipation since Sunday. Patient reports that he takes linzess daily and has taken miralax three times without relief.  Patient has had impactions in the past.         HISTORY OF PRESENT ILLNESS: 1 or more elements      History From: Patient  None     Claude E Gary is a 49 y.o. male who presents with cc of constipation. Hx of similar. Takes linzess daily and has taken miralax 3x w/o relief. Hx of requiring manual disimpactions. Seen several days ago and CT w/o obstruction     Nursing Notes were all reviewed and agreed with or any disagreements were addressed in the HPI.       PAST HISTORY     Past Medical History:  Past Medical History:   Diagnosis Date    AICD (automatic cardioverter/defibrillator) present     Below knee amputation (HCC) 05/28/2023    left    Bleeding ulcer     CAD (coronary artery disease)     anterior MI s/p 2 stents  2007    Constipation     DVT (deep venous thrombosis) ([facility]) 2001    in LLE \"multiple times\"    Endocarditis     ESRD on dialysis ([facility])     Home [facility] home x5 days week M-F does labs    Gallstone pancreatitis     GERD (gastroesophageal reflux disease)     High cholesterol     History of blood transfusion     HIT (heparin-induced thrombocytopenia) ([facility])     Hypertension     Kidney transplanted     x2    Pacemaker     Peritonitis (HCC)     Seizures ([facility]) 12/19/2023    last seizure \"over 2-3 years\"    Small bowel obstruction ([facility])     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[facility])     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         Past Surgical History:  Past Surgical History:   Procedure Laterality Date    APPENDECTOMY       DO Jerson (electronically signed)      (Please note that parts of this dictation were completed with voice recognition software. Quite often unanticipated grammatical, syntax, homophones, and other interpretive errors are inadvertently transcribed by the computer software. Please disregards these errors. Please excuse any errors that have escaped final proofreading.)          Virgilio Arthur,   12/06/24 0748

## 2024-12-06 NOTE — ED NOTES
Soap suds enema administered at this time per order. Approximately 150mL administered, patient requested to use the bedside commode after administration. Patient reports successful bowel movement, ambulated with steady gait to restroom and notes additional bowel movement.

## 2025-01-29 NOTE — ED PROVIDER NOTES
Alexey Adkins is a 50 year old male presenting for   Chief Complaint   Patient presents with    Establish Care    Medicare Wellness Visit     Denies Latex allergy or sensitivity.    Medication verified and med list updated  Refills needed today: No       Health Maintenance       Colorectal Cancer Screen (View Topic Details)  Never done    COVID-19 Vaccine (4 - 2024-25 season)  Overdue since 9/1/2024    Traditional Medicare- Medicare Wellness Visit (Yearly)  Due since 1/1/2025    Depression Screening (Yearly)  Due since 1/26/2025           Following review of the above:  Patient is not proceeding with: Colorectal Cancer Screening and COVID-19    Note: Refer to final orders and clinician documentation.                                Last lab results:   No results found for: \"HGBA1C\"  Cholesterol (mg/dL)   Date Value   01/19/2023 201 (H)     HDL (mg/dL)   Date Value   01/19/2023 46     Triglycerides (mg/dL)   Date Value   01/19/2023 189 (H)     LDL (mg/dL)   Date Value   01/19/2023 117     No results found for: \"URMIC\", \"UCR\", \"MALBCR\"  No results found for: \"IFOB\"                 Depression Screening:  Review Flowsheet  More data exists         1/26/2024   PHQ 2/9 Score   Adult PHQ 2 Score 0   Adult PHQ 2 Interpretation No further screening needed   Little interest or pleasure in activity? Not at all   Feeling down, depressed or hopeless? Not at all      Details                    Advance Directives:  discussed and advised the patient to complete one      EMERGENCY DEPARTMENT HISTORY AND PHYSICAL EXAM     ----------------------------------------------------------------------------  Please note that this dictation was completed with ([employer], the computer voice recognition software. Quite often unanticipated grammatical, syntax, homophones, and other interpretive errors are inadvertently transcribed by the computer software. Please disregard these errors. Please excuse any errors that have escaped final proofreading  ----------------------------------------------------------------------------      Date: 12/26/2021  Patient Name: Mari Blevins    History of Presenting Illness     Chief Complaint   Patient presents with    Vomiting Blood       History Provided By:  Patient    HPI: Mari Blevins is a 42 y.o. male, with significant pmhx of dilated, end-stage renal disease (T/TH/SAT) gastric varices with upper GI bleed, thrombocytopenia, transaminitis previous COVID pneumonia, on Coumadin for recurrent DVT, acute on chronic systolic heart failure with an EF of 35%, history of V. tach with AICD placement, CAD,who presents via private vehicle to the ED with c/o epigastric abdominal pain and vomiting blood throughout today. Notes that he was discharged from our hospital on 12/24 for the same symptoms having recurrent EGDs performed with banding of varices. Arrives with 2 emesis bag of bloody vomitus approximating 200 mL of material.  Patient also specifically denies any associated fevers, chills, CP, SOB,  pain, changes in BM, urinary sxs, or headache. Social Hx: denies tobacco  denies EtOH , denies recreational/Illicit Drugs    There are no other complaints, changes, or physical findings at this time.      PCP: Yaritza Garcia MD    Allergies   Allergen Reactions    Shellfish Containing Products Swelling     Break out in rash, trouble breathing    Contrast Agent [Iodine] Shortness of Breath    Heparin Analogues Other (comments)     Positive history of HIT Current Facility-Administered Medications   Medication Dose Route Frequency Provider Last Rate Last Admin    ondansetron (ZOFRAN ODT) tablet 8 mg  8 mg Oral NOW Mony Velazco MD        0.9% sodium chloride infusion 250 mL  250 mL IntraVENous PRN Mony Velazco MD        sodium chloride 0.9 % bolus infusion 250 mL  250 mL IntraVENous ONCE Mony Velazco  mL/hr at 12/26/21 0419 250 mL at 12/26/21 0419    sodium chloride (NS) flush 5-40 mL  5-40 mL IntraVENous Q8H Jean Rust NP        sodium chloride (NS) flush 5-40 mL  5-40 mL IntraVENous PRN Jean Rust NP        acetaminophen (TYLENOL) tablet 650 mg  650 mg Oral Q6H PRN Jean Rust NP        Or   Gi Jaida acetaminophen (TYLENOL) suppository 650 mg  650 mg Rectal Q6H PRN Jean Rust NP        polyethylene glycol (MIRALAX) packet 17 g  17 g Oral DAILY PRN Jean Rust NP        ondansetron [facility]) injection 4 mg  4 mg IntraVENous Q6H PRN Jean Rust NP         Current Outpatient Medications   Medication Sig Dispense Refill    nadoloL (CORGARD) 20 mg tablet Take 1 Tablet by mouth daily. 30 Tablet 2    metoclopramide HCl (REGLAN) 5 mg tablet Take 1 Tablet by mouth two (2) times a day for 30 days. 60 Tablet 0    pantoprazole (PROTONIX) 40 mg tablet Take 1 Tablet by mouth Before breakfast and dinner for 30 days. 60 Tablet 1    ondansetron (Zofran ODT) 4 mg disintegrating tablet 1 Tab by SubLINGual route every eight (8) hours as needed for Nausea or Vomiting. 20 Tab 0    metoprolol succinate (TOPROL-XL) 25 mg XL tablet Take 0.5 Tabs by mouth daily. 30 Tab 0    calcium acetate,phosphat bind, (PHOSLO) 667 mg cap Take 2 Caps by mouth three (3) times daily (with meals). Indications: low amount of calcium in the blood, renal osteodystrophy with hyperphosphatemia 120 Cap 0    atorvastatin (Lipitor) 20 mg tablet Take 20 mg by mouth daily.       acetaminophen (TYLENOL) 500 mg tablet Take 1 Tab by mouth every four (4) hours as needed for Pain. Over the counter 30 Tab 0    calcitRIOL (ROCALTROL) 0.5 mcg capsule Take 0.5 mcg by mouth daily. Past History     Past Medical History:  Past Medical History:   Diagnosis Date    Abdominal hematoma     AICD (automatic cardioverter/defibrillator) present     CAD (coronary artery disease)     anterior MI s/p 2 stents  2007    Chronic abdominal pain     Chronic kidney disease     ESRD; Dialysis dependent.  Home [facility] does labs- [facility] tpke    DVT (deep venous thrombosis) ([address] 2001    DVT of popliteal vein ([address] 11/2011    not anticoagulated due to bleeding, IVC filter    Endocarditis     Gallstone pancreatitis     Gastrointestinal disorder     acid reflux    Gastrointestinal disorder     peptic ulcer    Hemodialysis patient ([address]     High cholesterol     Hypertension     Kidney transplant     b/l kidneys 1995, 2001    Long term current use of anticoagulant therapy     Nephrotic syndrome     Other ill-defined conditions(799.89)     kidny transplant x2,  on dialysis    Other ill-defined conditions(799.89)     home dialysis    Other ill-defined conditions(799.89)     hx recurrent left leg DVT    Peritonitis ([address]     Seizures ([address] 2015    most recent- only had three in lifetime    Small bowel obstruction (HCC)     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)        Past Surgical History:  Past Surgical History:   Procedure Laterality Date    HX APPENDECTOMY      HX CHOLECYSTECTOMY      HX OTHER SURGICAL  11/2011    exploratory laparotomy    HX OTHER SURGICAL      fem-pop    HX OTHER SURGICAL  02/2013    right upper extremity fistula    HX PACEMAKER Left 6/2009    AICD-st latonya-not connected    HX PACEMAKER Left     AICD-left side-BS-working    HX SMALL BOWEL RESECTION      HX TRANSPLANT  2001     Kidney    HX TRANSPLANT  1992    kidney    HX VASCULAR ACCESS Right     femoral access for HD- does 5 day dialysis @ home    IR INSERT NON TUNL CVC OVER 5 YRS  12/7/2021    IR INSERT NON TUNL CVC OVER 5 YRS  12/10/2021    IR REMOVE TUNL CVAD W/O PORT / PUMP  10/29/2020    IR REPLACE CVC TUNNELED W/O PORT  9/10/2020    SD CARDIAC SURG PROCEDURE UNLIST  2007    2 stents    SD EDG US EXAM SURGICAL ALTER STOM DUODENUM/JEJUNUM  7/15/2011         SD ESOPHAGOGASTRODUODENOSCOPY TRANSORAL DIAGNOSTIC  5/24/2012         UPPER GI ENDOSCOPY,CTRL BLEED  12/6/2021         UPPER GI ENDOSCOPY,DIAGNOSIS  12/8/2021         VASCULAR SURGERY PROCEDURE UNLIST  11/14/2017    Insertion AV graft right upper arm (bovine); ligation of AV fistula right arm       Family History:  Family History   Problem Relation Age of Onset    COPD Mother     Lung Disease Mother     Cancer Father         stomach    Cancer Maternal Grandmother        Social History:  Social History     Tobacco Use    Smoking status: Never Smoker    Smokeless tobacco: Never Used   Substance Use Topics    Alcohol use: No    Drug use: Yes     Types: Prescription, OTC       Allergies: Allergies   Allergen Reactions    Shellfish Containing Products Swelling     Break out in rash, trouble breathing    Contrast Agent [Iodine] Shortness of Breath    Heparin Analogues Other (comments)     Positive history of HIT         Review of Systems   Review of Systems   Constitutional: Negative for chills and fever. HENT: Negative. Eyes: Negative. Respiratory: Negative for cough, chest tightness and shortness of breath. Cardiovascular: Negative for chest pain and leg swelling. Gastrointestinal: Positive for abdominal pain, nausea and vomiting. Negative for diarrhea. Endocrine: Negative. Genitourinary: Negative for difficulty urinating and dysuria. Musculoskeletal: Negative for myalgias. Skin: Negative. Neurological: Negative. Psychiatric/Behavioral: Negative. All other systems reviewed and are negative. Physical Exam   Physical Exam  Vitals and nursing note reviewed. Constitutional:       General: He is not in acute distress. Appearance: He is well-developed. He is not diaphoretic. HENT:      Head: Normocephalic and atraumatic. Nose: Nose normal.      Mouth/Throat:      Pharynx: No oropharyngeal exudate. Eyes:      Conjunctiva/sclera: Conjunctivae normal.      Pupils: Pupils are equal, round, and reactive to light. Neck:      Vascular: No JVD. Cardiovascular:      Rate and Rhythm: Normal rate and regular rhythm. Heart sounds: Normal heart sounds. No murmur heard. No friction rub. Pulmonary:      Effort: Pulmonary effort is normal. No respiratory distress. Breath sounds: Normal breath sounds. No stridor. No wheezing or rales. Abdominal:      General: Bowel sounds are normal. There is no distension. Palpations: Abdomen is soft. Tenderness: There is abdominal tenderness. There is no rebound. Musculoskeletal:         General: No tenderness. Normal range of motion. Cervical back: Normal range of motion and neck supple. Skin:     General: Skin is warm and dry. Findings: No rash. Neurological:      Mental Status: He is alert and oriented to person, place, and time. Cranial Nerves: No cranial nerve deficit. Psychiatric:         Speech: Speech normal.         Behavior: Behavior normal.         Thought Content:  Thought content normal.         Judgment: Judgment normal.           Diagnostic Study Results     Labs -     Recent Results (from the past 12 hour(s))   CBC WITH AUTOMATED DIFF    Collection Time: 12/26/21  1:11 AM   Result Value Ref Range    WBC 12.9 (H) 4.1 - 11.1 K/uL    RBC 2.76 (L) 4.10 - 5.70 M/uL    HGB 8.2 (L) 12.1 - 17.0 g/dL    HCT 26.4 (L) 36.6 - 50.3 %    MCV 95.7 80.0 - 99.0 FL    MCH 29.7 26.0 - 34.0 PG    MCHC 31.1 30.0 - 36.5 g/dL    RDW 19.1 (H) 11.5 - 14.5 %    PLATELET 160 255 - 886 K/uL    MPV 11.9 8.9 - 12.9 FL    NRBC 0.5 (H) 0  WBC    ABSOLUTE NRBC 0.06 (H) 0.00 - 0.01 K/uL NEUTROPHILS 54 32 - 75 %    LYMPHOCYTES 27 12 - 49 %    MONOCYTES 11 5 - 13 %    EOSINOPHILS 6 0 - 7 %    BASOPHILS 1 0 - 1 %    IMMATURE GRANULOCYTES 1 (H) 0.0 - 0.5 %    ABS. NEUTROPHILS 7.1 1.8 - 8.0 K/UL    ABS. LYMPHOCYTES 3.4 0.8 - 3.5 K/UL    ABS. MONOCYTES 1.4 (H) 0.0 - 1.0 K/UL    ABS. EOSINOPHILS 0.8 (H) 0.0 - 0.4 K/UL    ABS. BASOPHILS 0.2 (H) 0.0 - 0.1 K/UL    ABS. IMM. GRANS. 0.1 (H) 0.00 - 0.04 K/UL    DF AUTOMATED     METABOLIC PANEL, COMPREHENSIVE    Collection Time: 12/26/21  1:11 AM   Result Value Ref Range    Sodium 136 136 - 145 mmol/L    Potassium 4.7 3.5 - 5.1 mmol/L    Chloride 102 97 - 108 mmol/L    CO2 27 21 - 32 mmol/L    Anion gap 7 5 - 15 mmol/L    Glucose 92 65 - 100 mg/dL    BUN 32 (H) 6 - 20 MG/DL    Creatinine 7.27 (H) 0.70 - 1.30 MG/DL    BUN/Creatinine ratio 4 (L) 12 - 20      GFR est AA 10 (L) >60 ml/min/1.73m2    GFR est non-AA 8 (L) >60 ml/min/1.73m2    Calcium 7.5 (L) 8.5 - 10.1 MG/DL    Bilirubin, total 0.5 0.2 - 1.0 MG/DL    ALT (SGPT) 36 12 - 78 U/L    AST (SGOT) 54 (H) 15 - 37 U/L    Alk. phosphatase 177 (H) 45 - 117 U/L    Protein, total 5.9 (L) 6.4 - 8.2 g/dL    Albumin 1.9 (L) 3.5 - 5.0 g/dL    Globulin 4.0 2.0 - 4.0 g/dL    A-G Ratio 0.5 (L) 1.1 - 2.2     TYPE & SCREEN    Collection Time: 12/26/21  2:12 AM   Result Value Ref Range    Crossmatch Expiration 12/29/2021,2359     ABO/Rh(D) Geralene Peek POSITIVE     Antibody screen NEG    PROTHROMBIN TIME + INR    Collection Time: 12/26/21  2:12 AM   Result Value Ref Range    INR 1.2 (H) 0.9 - 1.1      Prothrombin time 12.8 (H) 9.0 - 11.1 sec   PTT    Collection Time: 12/26/21  2:12 AM   Result Value Ref Range    aPTT 22.7 22.1 - 31.0 sec    aPTT, therapeutic range     58.0 - 77.0 SECS   RBC, ALLOCATE    Collection Time: 12/26/21  4:15 AM   Result Value Ref Range    HISTORY CHECKED?  Historical check performed        Radiologic Studies -   CT HEAD WO CONT    (Results Pending)   CT SPINE CERV WO CONT    (Results Pending)     CT Results (Last 48 hours)    None        CXR Results  (Last 48 hours)    None            Medical Decision Making   I am the first provider for this patient. I reviewed the vital signs, available nursing notes, past medical history, past surgical history, family history and social history. Vital Signs-Reviewed the patient's vital signs. Patient Vitals for the past 12 hrs:   Temp Pulse Resp BP SpO2   12/26/21 0345 -- 82 16 (!) 87/45 96 %   12/26/21 0023 97.9 °F (36.6 °C) 92 16 (!) 90/57 100 %       Pulse Oximetry Analysis - 100% on RA, normal  Rate: 92 bpm  Rhythm: nsr      Provider Notes (Medical Decision Making):     DDX:  Recurrent upper GI bleed, anemia, dehydration    Plan:  Labs, type and screen, INR    Impression:  Upper GI bleed    ED Course:   Initial assessment performed. The patients presenting problems have been discussed, and they are in agreement with the care plan formulated and outlined with them. I have encouraged them to ask questions as they arise throughout their visit. I reviewed the nursing notes and and vital signs from today's visit, as well as the electronic medical record system for any past medical records that were available that may contribute to the patients current condition, including noting recent admission    Nursing notes will be reviewed as they become available in realtime while the pt has been in the ED. Dora Johnson MD      I personally reviewed/interpreted pt's imaging. Agree with official read by radiology as noted above. Dora Johnson MD    PROGRESS NOTE:  4:01 AM   Patient found on the floor next to the nurses station. Is being ambulated to the bathroom by nursing staff after noting he was dizzy. Nursing left to obtain wheelchair for patient and noted to have unwitnessed fall to the ground. Patient continues to complain of dizziness. Will send for CT imaging and C-spine imaging to ensure no evidence of fracture or intracranial bleed.   Patient was extremely difficult IV stick in order to obtain all blood work necessary. Will consult with G, plan for transfusion and plan for admission. Dora Johnson MD    CONSULT NOTE:   4:02 AM  Dora Johnson MD spoke with Dr. Breanna Montano,   Specialty: Krystyna Montano due to recurrent upper GI bleed. Discussed pt's HPI and available diagnostic results thus far. Consultant recommends ICU admission and will see this AM  Dora Johnson MD      ICU CONSULT NOTE:   4:33 Karma Durham MD spoke with NP José Fried,   Specialty: [address] [place] NP due to GI bleed, hypotension. Discussed pt's HPI and available diagnostic results thus far. Consultant will eval for Mica Chi MD    ADMISSION NOTE:  4:33 AM  Patient is being admitted to the hospital by ICU. The results of their tests and reasons for their admission have been discussed with them and/or available family. They convey agreement and understanding for the need to be admitted and for their admission diagnosis. Dora Johnson MD             Critical Care Time:     CRITICAL CARE NOTE  IMPENDING DETERIORATION -Airway, Respiratory, Cardiovascular, CNS, Metabolic, Renal and Hepatic  ASSOCIATED RISK FACTORS - Hypotension, Bleeding, Dysrhythmia, Metabolic changes, Dehydration, Vascular Compromise and CNS Decompensation  MANAGEMENT- Bedside Assessment and Supervision of Care  INTERPRETATION -  Xrays, ECG, Blood Pressure and Cardiac Output Measures   INTERVENTIONS - hemodynamic mngmt, vascular control, Neurologic interventions  and Metobolic interventions  CASE REVIEW - Hospitalist/Intensivist, Medical Sub-Specialist and Nursing  TREATMENT RESPONSE -Improved  PERFORMED BY - Self  NOTES   :  I have spent 125 minutes of critical care time involved in lab review, consultations with specialist, family decision- making, bedside attention and documentation excluding time spent on any separately billed procedures.  During this entire length of time I was immediately available to the patient . Reggie Stern MD        Diagnosis     Clinical Impression:   1. Acute upper GI bleed    2. Anemia, unspecified type    3. ESRD (end stage renal disease) on dialysis ([facility]    4. Hypotension due to hypovolemia        PLAN:  1.  Admit to ICU

## 2025-02-02 ENCOUNTER — APPOINTMENT (OUTPATIENT)
Facility: HOSPITAL | Age: 50
End: 2025-02-02
Payer: MEDICARE

## 2025-02-02 ENCOUNTER — HOSPITAL ENCOUNTER (EMERGENCY)
Facility: HOSPITAL | Age: 50
Discharge: HOME | End: 2025-02-03
Attending: STUDENT IN AN ORGANIZED HEALTH CARE EDUCATION/TRAINING PROGRAM
Payer: MEDICARE

## 2025-02-02 VITALS
BODY MASS INDEX: 19.38 KG/M2 | WEIGHT: 123.46 LBS | OXYGEN SATURATION: 100 % | TEMPERATURE: 97.9 F | HEART RATE: 107 BPM | SYSTOLIC BLOOD PRESSURE: 100 MMHG | RESPIRATION RATE: 16 BRPM | HEIGHT: 67 IN | DIASTOLIC BLOOD PRESSURE: 87 MMHG

## 2025-02-02 DIAGNOSIS — R11.2 NAUSEA AND VOMITING, UNSPECIFIED VOMITING TYPE: ICD-10-CM

## 2025-02-02 DIAGNOSIS — R10.9 ABDOMINAL PAIN, UNSPECIFIED ABDOMINAL LOCATION: Primary | ICD-10-CM

## 2025-02-02 LAB
ALBUMIN SERPL-MCNC: 3.4 G/DL (ref 3.5–5)
ALBUMIN/GLOB SERPL: 0.8 (ref 1.1–2.2)
ALP SERPL-CCNC: 409 U/L (ref 45–117)
ALT SERPL-CCNC: 13 U/L (ref 12–78)
ANION GAP SERPL CALC-SCNC: 8 MMOL/L (ref 2–12)
AST SERPL-CCNC: 13 U/L (ref 15–37)
BASOPHILS # BLD: 0.09 K/UL (ref 0–0.1)
BASOPHILS NFR BLD: 1 % (ref 0–1)
BILIRUB SERPL-MCNC: 0.4 MG/DL (ref 0.2–1)
BUN SERPL-MCNC: 22 MG/DL (ref 6–20)
BUN/CREAT SERPL: 4 (ref 12–20)
CALCIUM SERPL-MCNC: 9.5 MG/DL (ref 8.5–10.1)
CHLORIDE SERPL-SCNC: 101 MMOL/L (ref 97–108)
CO2 SERPL-SCNC: 27 MMOL/L (ref 21–32)
CREAT SERPL-MCNC: 5.15 MG/DL (ref 0.7–1.3)
DIFFERENTIAL METHOD BLD: ABNORMAL
EOSINOPHIL # BLD: 0.24 K/UL (ref 0–0.4)
EOSINOPHIL NFR BLD: 2.6 % (ref 0–7)
ERYTHROCYTE [DISTWIDTH] IN BLOOD BY AUTOMATED COUNT: 15.9 % (ref 11.5–14.5)
GLOBULIN SER CALC-MCNC: 4.5 G/DL (ref 2–4)
GLUCOSE SERPL-MCNC: 101 MG/DL (ref 65–100)
HCT VFR BLD AUTO: 26.9 % (ref 36.6–50.3)
HGB BLD-MCNC: 8.7 G/DL (ref 12.1–17)
IMM GRANULOCYTES # BLD AUTO: 0.03 K/UL (ref 0–0.04)
IMM GRANULOCYTES NFR BLD AUTO: 0.3 % (ref 0–0.5)
LIPASE SERPL-CCNC: 95 U/L (ref 13–75)
LYMPHOCYTES # BLD: 1.48 K/UL (ref 0.8–3.5)
LYMPHOCYTES NFR BLD: 16 % (ref 12–49)
MCH RBC QN AUTO: 30.7 PG (ref 26–34)
MCHC RBC AUTO-ENTMCNC: 32.3 G/DL (ref 30–36.5)
MCV RBC AUTO: 95.1 FL (ref 80–99)
MONOCYTES # BLD: 1.32 K/UL (ref 0–1)
MONOCYTES NFR BLD: 14.3 % (ref 5–13)
NEUTS SEG # BLD: 6.08 K/UL (ref 1.8–8)
NEUTS SEG NFR BLD: 65.8 % (ref 32–75)
NRBC # BLD: 0 K/UL (ref 0–0.01)
NRBC BLD-RTO: 0 PER 100 WBC
PLATELET # BLD AUTO: 166 K/UL (ref 150–400)
PMV BLD AUTO: 10.9 FL (ref 8.9–12.9)
POTASSIUM SERPL-SCNC: 3.2 MMOL/L (ref 3.5–5.1)
PROT SERPL-MCNC: 7.9 G/DL (ref 6.4–8.2)
RBC # BLD AUTO: 2.83 M/UL (ref 4.1–5.7)
SODIUM SERPL-SCNC: 136 MMOL/L (ref 136–145)
WBC # BLD AUTO: 9.2 K/UL (ref 4.1–11.1)

## 2025-02-02 PROCEDURE — 80053 COMPREHEN METABOLIC PANEL: CPT

## 2025-02-02 PROCEDURE — 6360000002 HC RX W HCPCS: Performed by: STUDENT IN AN ORGANIZED HEALTH CARE EDUCATION/TRAINING PROGRAM

## 2025-02-02 PROCEDURE — 99284 EMERGENCY DEPT VISIT MOD MDM: CPT

## 2025-02-02 PROCEDURE — 36415 COLL VENOUS BLD VENIPUNCTURE: CPT

## 2025-02-02 PROCEDURE — 96374 THER/PROPH/DIAG INJ IV PUSH: CPT

## 2025-02-02 PROCEDURE — 74176 CT ABD & PELVIS W/O CONTRAST: CPT

## 2025-02-02 PROCEDURE — 85025 COMPLETE CBC W/AUTO DIFF WBC: CPT

## 2025-02-02 PROCEDURE — 93005 ELECTROCARDIOGRAM TRACING: CPT | Performed by: STUDENT IN AN ORGANIZED HEALTH CARE EDUCATION/TRAINING PROGRAM

## 2025-02-02 PROCEDURE — 83690 ASSAY OF LIPASE: CPT

## 2025-02-02 PROCEDURE — 6370000000 HC RX 637 (ALT 250 FOR IP): Performed by: STUDENT IN AN ORGANIZED HEALTH CARE EDUCATION/TRAINING PROGRAM

## 2025-02-02 RX ORDER — ONDANSETRON 2 MG/ML
4 INJECTION INTRAMUSCULAR; INTRAVENOUS ONCE
Status: DISCONTINUED | OUTPATIENT
Start: 2025-02-02 | End: 2025-02-02

## 2025-02-02 RX ORDER — POTASSIUM CHLORIDE 750 MG/1
20 TABLET, EXTENDED RELEASE ORAL ONCE
Status: COMPLETED | OUTPATIENT
Start: 2025-02-02 | End: 2025-02-02

## 2025-02-02 RX ORDER — HYDROXYZINE HYDROCHLORIDE 25 MG/1
50 TABLET, FILM COATED ORAL 3 TIMES DAILY PRN
Status: DISCONTINUED | OUTPATIENT
Start: 2025-02-02 | End: 2025-02-02

## 2025-02-02 RX ORDER — HYDROXYZINE HYDROCHLORIDE 25 MG/1
50 TABLET, FILM COATED ORAL ONCE
Status: COMPLETED | OUTPATIENT
Start: 2025-02-02 | End: 2025-02-02

## 2025-02-02 RX ORDER — HYDROMORPHONE HYDROCHLORIDE 1 MG/ML
1 INJECTION, SOLUTION INTRAMUSCULAR; INTRAVENOUS; SUBCUTANEOUS
Status: COMPLETED | OUTPATIENT
Start: 2025-02-02 | End: 2025-02-02

## 2025-02-02 RX ADMIN — POTASSIUM CHLORIDE 20 MEQ: 750 TABLET, EXTENDED RELEASE ORAL at 23:19

## 2025-02-02 RX ADMIN — HYDROMORPHONE HYDROCHLORIDE 1 MG: 1 INJECTION, SOLUTION INTRAMUSCULAR; INTRAVENOUS; SUBCUTANEOUS at 22:08

## 2025-02-02 RX ADMIN — HYDROXYZINE HYDROCHLORIDE 50 MG: 25 TABLET ORAL at 22:08

## 2025-02-02 ASSESSMENT — PAIN DESCRIPTION - DESCRIPTORS: DESCRIPTORS: SHARP;STABBING

## 2025-02-02 ASSESSMENT — PAIN DESCRIPTION - ORIENTATION: ORIENTATION: LEFT;LOWER

## 2025-02-02 ASSESSMENT — PAIN SCALES - GENERAL
PAINLEVEL_OUTOF10: 8
PAINLEVEL_OUTOF10: 7

## 2025-02-02 ASSESSMENT — PAIN DESCRIPTION - LOCATION
LOCATION: ABDOMEN
LOCATION: ABDOMEN

## 2025-02-02 ASSESSMENT — PAIN DESCRIPTION - PAIN TYPE: TYPE: ACUTE PAIN

## 2025-02-03 LAB
EKG ATRIAL RATE: 101 BPM
EKG DIAGNOSIS: NORMAL
EKG P AXIS: 52 DEGREES
EKG P-R INTERVAL: 158 MS
EKG Q-T INTERVAL: 440 MS
EKG QRS DURATION: 172 MS
EKG QTC CALCULATION (BAZETT): 570 MS
EKG R AXIS: 262 DEGREES
EKG T AXIS: 70 DEGREES
EKG VENTRICULAR RATE: 101 BPM

## 2025-02-03 PROCEDURE — 96376 TX/PRO/DX INJ SAME DRUG ADON: CPT

## 2025-02-03 PROCEDURE — 6360000002 HC RX W HCPCS: Performed by: STUDENT IN AN ORGANIZED HEALTH CARE EDUCATION/TRAINING PROGRAM

## 2025-02-03 RX ORDER — HYDROMORPHONE HYDROCHLORIDE 1 MG/ML
1 INJECTION, SOLUTION INTRAMUSCULAR; INTRAVENOUS; SUBCUTANEOUS
Status: COMPLETED | OUTPATIENT
Start: 2025-02-03 | End: 2025-02-03

## 2025-02-03 RX ADMIN — HYDROMORPHONE HYDROCHLORIDE 1 MG: 1 INJECTION, SOLUTION INTRAMUSCULAR; INTRAVENOUS; SUBCUTANEOUS at 00:08

## 2025-02-03 ASSESSMENT — PAIN DESCRIPTION - LOCATION: LOCATION: ABDOMEN

## 2025-02-03 ASSESSMENT — PAIN SCALES - GENERAL: PAINLEVEL_OUTOF10: 7

## 2025-02-03 NOTE — ED PROVIDER NOTES
[facility]ospitals EMERGENCY DEPT  EMERGENCY DEPARTMENT HISTORY AND PHYSICAL EXAM      Date: 2/2/2025  Patient Name: Claude E Gary  MRN: [id number]  Birthdate 1975  Date of evaluation: 2/2/2025  Provider: aWqas Camarillo MD   Note Started: 9:22 PM EST 2/2/25    HISTORY OF PRESENT ILLNESS     Chief Complaint   Patient presents with    Abdominal Pain     Ambulatory c/o LLQ abdominal pain, n/v progressing since 1400. Denies blood in vomit. Dialysis T,TH,S, states he had 2.2L taken off.        History Provided By: Patient    HPI: Claude E Gary is a 49 y.o. male PMH end-stage renal disease, hypertension, seizure, SBO, recent hospitalization for MRSA infection which he cleared with antibiotics and had his dialysis femoral access line replaced and no further issues with his access line.  He had dialysis yesterday and had the normal amount of fluid taken off (2.0-2.5 liters normally taken off, he had 2.2 taken off yesterday) no shortness of breath or chest pain.  He reports his blood pressure is normally low and he is on midodrine multiple times a day for this.  He denies any fevers chills night sweats.  He does report nausea and vomiting with the abdominal pain.    PAST MEDICAL HISTORY   Past Medical History:  Past Medical History:   Diagnosis Date    AICD (automatic cardioverter/defibrillator) present     Below knee amputation ([facility]) 05/28/2023    left    Bleeding ulcer     CAD (coronary artery disease)     anterior MI s/p 2 stents  2007    Constipation     DVT (deep venous thrombosis) ([facility]) 2001    in LLE \"multiple times\"    Endocarditis     ESRD on dialysis ([facility])     Home [facility] home x5 days week M-F does labs    Gallstone pancreatitis     GERD (gastroesophageal reflux disease)     High cholesterol     History of blood transfusion     HIT (heparin-induced thrombocytopenia) ([facility])     Hypertension     Kidney transplanted     x2    Pacemaker     Peritonitis ([facility])     Seizures ([facility]) 12/19/2023    last seizure \"over 2-3  for Nausea or Vomiting 20 tablet 0    albuterol sulfate HFA (VENTOLIN HFA) 108 (90 Base) MCG/ACT inhaler Inhale 2 puffs into the lungs 4 times daily as needed for Wheezing 18 g 0    linaclotide (LINZESS) 145 MCG capsule Take 1 capsule by mouth every morning (before breakfast) 30 capsule 2    metoprolol succinate (TOPROL XL) 25 MG extended release tablet Take 0.5 tablets by mouth daily (Patient taking differently: Take 0.5 tablets by mouth daily as needed) 30 tablet 3    midodrine (PROAMATINE) 5 MG tablet Take 1 tablet by mouth 3 times daily as needed (for sbp<=100) (Patient taking differently: Take 1 tablet by mouth Five times weekly 30 minutes before dialysis on Monday, Tuesday, Wednesday, Thursday & Fridays) 90 tablet 3    sertraline (ZOLOFT) 50 MG tablet Take 1 tablet by mouth daily      oxyCODONE HCl (OXY-IR) 10 MG immediate release tablet Take 1 tablet by mouth every 4 hours as needed.      metoclopramide (REGLAN) 5 MG tablet Take 1 tablet by mouth 2 times daily as needed      cinacalcet (SENSIPAR) 60 MG tablet Take 1 tablet by mouth daily      acetaminophen (TYLENOL) 500 MG tablet Take 1 tablet by mouth every 4 hours as needed      aspirin 81 MG EC tablet Take 1 tablet by mouth daily      atorvastatin (LIPITOR) 20 MG tablet Take 1 tablet by mouth daily      calcitRIOL (ROCALTROL) 0.5 MCG capsule Take 1 capsule by mouth daily      calcium acetate (PHOSLO) 667 MG CAPS capsule Take 2 capsules by mouth 3 times daily (with meals)      dicyclomine (BENTYL) 20 MG tablet Take 1 tablet by mouth every 8 hours as needed      ergocalciferol (ERGOCALCIFEROL) 1.25 MG (11434 UT) capsule Take 1 capsule by mouth every 7 days      pantoprazole (PROTONIX) 40 MG tablet Take 1 tablet by mouth 2 times daily (before meals)      sevelamer (RENVELA) 800 MG tablet Take 2 tablets by mouth 3 times daily      sucralfate (CARAFATE) 1 GM tablet Take 1 tablet by mouth 4 times daily (before meals and nightly)         Social Determinants of

## 2025-02-03 NOTE — DISCHARGE INSTRUCTIONS
Thank you for choosing our Emergency Department for your care.  It is our privilege to care for you in your time of need.  In the next several days, you may receive a survey via email or mailed to your home about your experience with our team.  We would greatly appreciate you taking a few minutes to complete the survey, as we use this information to learn what we have done well and what we could be doing better. Thank you for trusting us with your care!    Below you will find a list of your tests from today's visit.   Labs and Radiology Studies  Recent Results (from the past 12 hour(s))   EKG 12 Lead (Abn HR)    Collection Time: 02/02/25  9:28 PM   Result Value Ref Range    Ventricular Rate 101 BPM    Atrial Rate 101 BPM    P-R Interval 158 ms    QRS Duration 172 ms    Q-T Interval 440 ms    QTc Calculation (Bazett) 570 ms    P Axis 52 degrees    R Axis 262 degrees    T Axis 70 degrees    Diagnosis       Sinus tachycardia  Possible Left atrial enlargement  Right bundle branch block , plus right ventricular hypertrophy  Inferior infarct , age undetermined  Anteroseptal infarct , age undetermined  Abnormal ECG  When compared with ECG of 07-JAN-2024 16:02,  Right bundle branch block is now present  Anteroseptal infarct is now present  Inferior infarct is now present     CBC with Auto Differential    Collection Time: 02/02/25 10:01 PM   Result Value Ref Range    WBC 9.2 4.1 - 11.1 K/uL    RBC 2.83 (L) 4.10 - 5.70 M/uL    Hemoglobin 8.7 (L) 12.1 - 17.0 g/dL    Hematocrit 26.9 (L) 36.6 - 50.3 %    MCV 95.1 80.0 - 99.0 FL    MCH 30.7 26.0 - 34.0 PG    MCHC 32.3 30.0 - 36.5 g/dL    RDW 15.9 (H) 11.5 - 14.5 %    Platelets 166 150 - 400 K/uL    MPV 10.9 8.9 - 12.9 FL    Nucleated RBCs 0.0 0  WBC    nRBC 0.00 0.00 - 0.01 K/uL    Neutrophils % 65.8 32.0 - 75.0 %    Lymphocytes % 16.0 12.0 - 49.0 %    Monocytes % 14.3 (H) 5.0 - 13.0 %    Eosinophils % 2.6 0.0 - 7.0 %    Basophils % 1.0 0.0 - 1.0 %    Immature

## 2025-02-07 ENCOUNTER — APPOINTMENT (OUTPATIENT)
Facility: HOSPITAL | Age: 50
End: 2025-02-07
Payer: MEDICARE

## 2025-02-07 ENCOUNTER — HOSPITAL ENCOUNTER (EMERGENCY)
Facility: HOSPITAL | Age: 50
Discharge: HOME | End: 2025-02-08
Attending: STUDENT IN AN ORGANIZED HEALTH CARE EDUCATION/TRAINING PROGRAM
Payer: MEDICARE

## 2025-02-07 DIAGNOSIS — R10.84 GENERALIZED ABDOMINAL PAIN: Primary | ICD-10-CM

## 2025-02-07 LAB
ALBUMIN SERPL-MCNC: 3.8 G/DL (ref 3.5–5)
ALBUMIN/GLOB SERPL: 0.7 (ref 1.1–2.2)
ALP SERPL-CCNC: 609 U/L (ref 45–117)
ALT SERPL-CCNC: 12 U/L (ref 12–78)
ANION GAP SERPL CALC-SCNC: 9 MMOL/L (ref 2–12)
AST SERPL-CCNC: 19 U/L (ref 15–37)
BASOPHILS # BLD: 0.08 K/UL (ref 0–0.1)
BASOPHILS NFR BLD: 0.8 % (ref 0–1)
BILIRUB SERPL-MCNC: 0.5 MG/DL (ref 0.2–1)
BUN SERPL-MCNC: 9 MG/DL (ref 6–20)
BUN/CREAT SERPL: 3 (ref 12–20)
CALCIUM SERPL-MCNC: 9.9 MG/DL (ref 8.5–10.1)
CHLORIDE SERPL-SCNC: 95 MMOL/L (ref 97–108)
CO2 SERPL-SCNC: 31 MMOL/L (ref 21–32)
CREAT SERPL-MCNC: 3.59 MG/DL (ref 0.7–1.3)
DIFFERENTIAL METHOD BLD: ABNORMAL
EOSINOPHIL # BLD: 0.49 K/UL (ref 0–0.4)
EOSINOPHIL NFR BLD: 5.2 % (ref 0–7)
ERYTHROCYTE [DISTWIDTH] IN BLOOD BY AUTOMATED COUNT: 16.6 % (ref 11.5–14.5)
GLOBULIN SER CALC-MCNC: 5.4 G/DL (ref 2–4)
GLUCOSE SERPL-MCNC: 76 MG/DL (ref 65–100)
HCT VFR BLD AUTO: 30.6 % (ref 36.6–50.3)
HGB BLD-MCNC: 9.9 G/DL (ref 12.1–17)
IMM GRANULOCYTES # BLD AUTO: 0.03 K/UL (ref 0–0.04)
IMM GRANULOCYTES NFR BLD AUTO: 0.3 % (ref 0–0.5)
LYMPHOCYTES # BLD: 1.65 K/UL (ref 0.8–3.5)
LYMPHOCYTES NFR BLD: 17.4 % (ref 12–49)
MCH RBC QN AUTO: 30.6 PG (ref 26–34)
MCHC RBC AUTO-ENTMCNC: 32.4 G/DL (ref 30–36.5)
MCV RBC AUTO: 94.4 FL (ref 80–99)
MONOCYTES # BLD: 0.99 K/UL (ref 0–1)
MONOCYTES NFR BLD: 10.4 % (ref 5–13)
NEUTS SEG # BLD: 6.25 K/UL (ref 1.8–8)
NEUTS SEG NFR BLD: 65.9 % (ref 32–75)
NRBC # BLD: 0 K/UL (ref 0–0.01)
NRBC BLD-RTO: 0 PER 100 WBC
PLATELET # BLD AUTO: 228 K/UL (ref 150–400)
PMV BLD AUTO: 10.3 FL (ref 8.9–12.9)
POTASSIUM SERPL-SCNC: 3.1 MMOL/L (ref 3.5–5.1)
PROT SERPL-MCNC: 9.2 G/DL (ref 6.4–8.2)
RBC # BLD AUTO: 3.24 M/UL (ref 4.1–5.7)
SODIUM SERPL-SCNC: 135 MMOL/L (ref 136–145)
WBC # BLD AUTO: 9.5 K/UL (ref 4.1–11.1)

## 2025-02-07 PROCEDURE — 74018 RADEX ABDOMEN 1 VIEW: CPT

## 2025-02-07 PROCEDURE — 36415 COLL VENOUS BLD VENIPUNCTURE: CPT

## 2025-02-07 PROCEDURE — 96374 THER/PROPH/DIAG INJ IV PUSH: CPT

## 2025-02-07 PROCEDURE — 99284 EMERGENCY DEPT VISIT MOD MDM: CPT

## 2025-02-07 PROCEDURE — 6360000002 HC RX W HCPCS: Performed by: STUDENT IN AN ORGANIZED HEALTH CARE EDUCATION/TRAINING PROGRAM

## 2025-02-07 PROCEDURE — 83690 ASSAY OF LIPASE: CPT

## 2025-02-07 PROCEDURE — 6370000000 HC RX 637 (ALT 250 FOR IP): Performed by: STUDENT IN AN ORGANIZED HEALTH CARE EDUCATION/TRAINING PROGRAM

## 2025-02-07 PROCEDURE — 80053 COMPREHEN METABOLIC PANEL: CPT

## 2025-02-07 PROCEDURE — 85025 COMPLETE CBC W/AUTO DIFF WBC: CPT

## 2025-02-07 RX ORDER — HYDROXYZINE HYDROCHLORIDE 25 MG/1
50 TABLET, FILM COATED ORAL ONCE
Status: COMPLETED | OUTPATIENT
Start: 2025-02-07 | End: 2025-02-07

## 2025-02-07 RX ORDER — HYDROMORPHONE HYDROCHLORIDE 1 MG/ML
1 INJECTION, SOLUTION INTRAMUSCULAR; INTRAVENOUS; SUBCUTANEOUS
Status: COMPLETED | OUTPATIENT
Start: 2025-02-07 | End: 2025-02-07

## 2025-02-07 RX ADMIN — HYDROXYZINE HYDROCHLORIDE 50 MG: 25 TABLET, FILM COATED ORAL at 23:43

## 2025-02-07 RX ADMIN — HYDROMORPHONE HYDROCHLORIDE 1 MG: 1 INJECTION, SOLUTION INTRAMUSCULAR; INTRAVENOUS; SUBCUTANEOUS at 23:43

## 2025-02-07 ASSESSMENT — PAIN - FUNCTIONAL ASSESSMENT
PAIN_FUNCTIONAL_ASSESSMENT: ACTIVITIES ARE NOT PREVENTED
PAIN_FUNCTIONAL_ASSESSMENT: 0-10

## 2025-02-07 ASSESSMENT — PAIN DESCRIPTION - DESCRIPTORS
DESCRIPTORS: ACHING
DESCRIPTORS: DULL;ACHING

## 2025-02-07 ASSESSMENT — PAIN SCALES - GENERAL
PAINLEVEL_OUTOF10: 7
PAINLEVEL_OUTOF10: 7

## 2025-02-07 ASSESSMENT — PAIN DESCRIPTION - LOCATION
LOCATION: ABDOMEN
LOCATION: ABDOMEN

## 2025-02-07 ASSESSMENT — PAIN DESCRIPTION - ORIENTATION
ORIENTATION: LEFT;LOWER
ORIENTATION: LEFT;LOWER

## 2025-02-07 NOTE — ED TRIAGE NOTES
TRIAGE: Patient arrives ambulatory from dialysis. He was rescheduled from yesterday to today due to feeling unwell yesterday. He reports he started treatment at 1230 and LLQ abdominal pain, nausea and vomiting at 3PM. Afebrile

## 2025-02-08 VITALS
HEIGHT: 67 IN | RESPIRATION RATE: 14 BRPM | WEIGHT: 114.64 LBS | TEMPERATURE: 98.3 F | OXYGEN SATURATION: 100 % | BODY MASS INDEX: 17.99 KG/M2 | HEART RATE: 92 BPM | SYSTOLIC BLOOD PRESSURE: 116 MMHG | DIASTOLIC BLOOD PRESSURE: 88 MMHG

## 2025-02-08 LAB — LIPASE SERPL-CCNC: 98 U/L (ref 13–75)

## 2025-02-08 RX ORDER — HYDROXYZINE HYDROCHLORIDE 25 MG/1
25 TABLET, FILM COATED ORAL EVERY 8 HOURS PRN
Qty: 30 TABLET | Refills: 0 | Status: SHIPPED | OUTPATIENT
Start: 2025-02-08 | End: 2025-02-18

## 2025-02-08 ASSESSMENT — PAIN DESCRIPTION - ORIENTATION: ORIENTATION: LEFT;LOWER

## 2025-02-08 ASSESSMENT — PAIN SCALES - GENERAL: PAINLEVEL_OUTOF10: 4

## 2025-02-08 ASSESSMENT — PAIN DESCRIPTION - LOCATION: LOCATION: ABDOMEN

## 2025-02-08 NOTE — DISCHARGE INSTRUCTIONS
Thank you for choosing our Emergency Department for your care.  It is our privilege to care for you in your time of need.  In the next several days, you may receive a survey via email or mailed to your home about your experience with our team.  We would greatly appreciate you taking a few minutes to complete the survey, as we use this information to learn what we have done well and what we could be doing better. Thank you for trusting us with your care!    Below you will find a list of your tests from today's visit.   Labs and Radiology Studies  Recent Results (from the past 12 hour(s))   CBC with Auto Differential    Collection Time: 02/07/25  9:33 PM   Result Value Ref Range    WBC 9.5 4.1 - 11.1 K/uL    RBC 3.24 (L) 4.10 - 5.70 M/uL    Hemoglobin 9.9 (L) 12.1 - 17.0 g/dL    Hematocrit 30.6 (L) 36.6 - 50.3 %    MCV 94.4 80.0 - 99.0 FL    MCH 30.6 26.0 - 34.0 PG    MCHC 32.4 30.0 - 36.5 g/dL    RDW 16.6 (H) 11.5 - 14.5 %    Platelets 228 150 - 400 K/uL    MPV 10.3 8.9 - 12.9 FL    Nucleated RBCs 0.0 0  WBC    nRBC 0.00 0.00 - 0.01 K/uL    Neutrophils % 65.9 32.0 - 75.0 %    Lymphocytes % 17.4 12.0 - 49.0 %    Monocytes % 10.4 5.0 - 13.0 %    Eosinophils % 5.2 0.0 - 7.0 %    Basophils % 0.8 0.0 - 1.0 %    Immature Granulocytes % 0.3 0.0 - 0.5 %    Neutrophils Absolute 6.25 1.80 - 8.00 K/UL    Lymphocytes Absolute 1.65 0.80 - 3.50 K/UL    Monocytes Absolute 0.99 0.00 - 1.00 K/UL    Eosinophils Absolute 0.49 (H) 0.00 - 0.40 K/UL    Basophils Absolute 0.08 0.00 - 0.10 K/UL    Immature Granulocytes Absolute 0.03 0.00 - 0.04 K/UL    Differential Type AUTOMATED     Comprehensive Metabolic Panel    Collection Time: 02/07/25  9:33 PM   Result Value Ref Range    Sodium 135 (L) 136 - 145 mmol/L    Potassium 3.1 (L) 3.5 - 5.1 mmol/L    Chloride 95 (L) 97 - 108 mmol/L    CO2 31 21 - 32 mmol/L    Anion Gap 9 2 - 12 mmol/L    Glucose 76 65 - 100 mg/dL    BUN 9 6 - 20 MG/DL    Creatinine 3.59 (H) 0.70 - 1.30 MG/DL

## 2025-02-08 NOTE — ED PROVIDER NOTES
[facility] EMERGENCY DEPT  EMERGENCY DEPARTMENT HISTORY AND PHYSICAL EXAM      Date: 2/7/2025  Patient Name: Claude E Gary  MRN: [id number]  Birthdate 1975  Date of evaluation: 2/7/2025  Provider: Waqas Camarillo MD   Note Started: 12:25 AM EST 2/8/25    HISTORY OF PRESENT ILLNESS     Chief Complaint   Patient presents with    Abdominal Pain       History Provided By: Patient    HPI: Claude E Gary is a 49 y.o. male PMH ESRD, other medical history as below presenting after abdominal pain with dialysis again.  I evaluated patient last week for similar complaints and he often has abdominal pain with dialysis.  He has oxycodone 10 mg at home, and follows up with his primary care doctor.  He denies any vomiting this but does feel nauseous and they only had Zofran at dialysis to give him which he cannot have due to his long QT.  He reports he usually takes Atarax for this but they did not have it at the facility    PAST MEDICAL HISTORY   Past Medical History:  Past Medical History:   Diagnosis Date    AICD (automatic cardioverter/defibrillator) present     Below knee amputation (HCC) 05/28/2023    left    Bleeding ulcer     CAD (coronary artery disease)     anterior MI s/p 2 stents  2007    Constipation     DVT (deep venous thrombosis) ([facility]) 2001    in LLE \"multiple times\"    Endocarditis     ESRD on dialysis ([facility])     Home [facility] home x5 days week M-F does labs    Gallstone pancreatitis     GERD (gastroesophageal reflux disease)     High cholesterol     History of blood transfusion     HIT (heparin-induced thrombocytopenia) ([facility])     Hypertension     Kidney transplanted     x2    Pacemaker     Peritonitis ([facility])     Seizures ([facility]) 12/19/2023    last seizure \"over 2-3 years\"    Small bowel obstruction ([facility])     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[facility])     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       Past Surgical History:  Past Surgical History:   Procedure Laterality Date    APPENDECTOMY      CHOLECYSTECTOMY      COLONOSCOPY N/A

## 2025-02-12 ENCOUNTER — APPOINTMENT (OUTPATIENT)
Facility: HOSPITAL | Age: 50
End: 2025-02-12
Payer: MEDICARE

## 2025-02-12 ENCOUNTER — HOSPITAL ENCOUNTER (EMERGENCY)
Facility: HOSPITAL | Age: 50
Discharge: HOME | End: 2025-02-12
Attending: EMERGENCY MEDICINE
Payer: MEDICARE

## 2025-02-12 VITALS
WEIGHT: 126.1 LBS | TEMPERATURE: 97.7 F | HEART RATE: 81 BPM | DIASTOLIC BLOOD PRESSURE: 82 MMHG | OXYGEN SATURATION: 96 % | BODY MASS INDEX: 19.75 KG/M2 | SYSTOLIC BLOOD PRESSURE: 123 MMHG | RESPIRATION RATE: 18 BRPM

## 2025-02-12 DIAGNOSIS — R10.32 LEFT LOWER QUADRANT ABDOMINAL PAIN: Primary | ICD-10-CM

## 2025-02-12 DIAGNOSIS — R11.2 NAUSEA AND VOMITING, UNSPECIFIED VOMITING TYPE: ICD-10-CM

## 2025-02-12 LAB
ALBUMIN SERPL-MCNC: 3.5 G/DL (ref 3.5–5)
ALBUMIN/GLOB SERPL: 0.8 (ref 1.1–2.2)
ALP SERPL-CCNC: 560 U/L (ref 45–117)
ALT SERPL-CCNC: 12 U/L (ref 12–78)
ANION GAP SERPL CALC-SCNC: 8 MMOL/L (ref 2–12)
AST SERPL-CCNC: 13 U/L (ref 15–37)
BASOPHILS # BLD: 0.08 K/UL (ref 0–0.1)
BASOPHILS NFR BLD: 1.1 % (ref 0–1)
BILIRUB SERPL-MCNC: 0.5 MG/DL (ref 0.2–1)
BUN SERPL-MCNC: 14 MG/DL (ref 6–20)
BUN/CREAT SERPL: 3 (ref 12–20)
CALCIUM SERPL-MCNC: 9.2 MG/DL (ref 8.5–10.1)
CHLORIDE SERPL-SCNC: 95 MMOL/L (ref 97–108)
CO2 SERPL-SCNC: 32 MMOL/L (ref 21–32)
CREAT SERPL-MCNC: 4.8 MG/DL (ref 0.7–1.3)
DIFFERENTIAL METHOD BLD: ABNORMAL
EOSINOPHIL # BLD: 0.54 K/UL (ref 0–0.4)
EOSINOPHIL NFR BLD: 7.1 % (ref 0–7)
ERYTHROCYTE [DISTWIDTH] IN BLOOD BY AUTOMATED COUNT: 15.7 % (ref 11.5–14.5)
GLOBULIN SER CALC-MCNC: 4.3 G/DL (ref 2–4)
GLUCOSE SERPL-MCNC: 64 MG/DL (ref 65–100)
HCT VFR BLD AUTO: 26.8 % (ref 36.6–50.3)
HGB BLD-MCNC: 8.8 G/DL (ref 12.1–17)
IMM GRANULOCYTES # BLD AUTO: 0.02 K/UL (ref 0–0.04)
IMM GRANULOCYTES NFR BLD AUTO: 0.3 % (ref 0–0.5)
LIPASE SERPL-CCNC: 39 U/L (ref 13–75)
LYMPHOCYTES # BLD: 1.14 K/UL (ref 0.8–3.5)
LYMPHOCYTES NFR BLD: 15 % (ref 12–49)
MCH RBC QN AUTO: 30.2 PG (ref 26–34)
MCHC RBC AUTO-ENTMCNC: 32.8 G/DL (ref 30–36.5)
MCV RBC AUTO: 92.1 FL (ref 80–99)
MONOCYTES # BLD: 0.7 K/UL (ref 0–1)
MONOCYTES NFR BLD: 9.2 % (ref 5–13)
NEUTS SEG # BLD: 5.12 K/UL (ref 1.8–8)
NEUTS SEG NFR BLD: 67.3 % (ref 32–75)
NRBC # BLD: 0 K/UL (ref 0–0.01)
NRBC BLD-RTO: 0 PER 100 WBC
PLATELET # BLD AUTO: 156 K/UL (ref 150–400)
PMV BLD AUTO: 10.7 FL (ref 8.9–12.9)
POTASSIUM SERPL-SCNC: 3.2 MMOL/L (ref 3.5–5.1)
PROT SERPL-MCNC: 7.8 G/DL (ref 6.4–8.2)
RBC # BLD AUTO: 2.91 M/UL (ref 4.1–5.7)
SODIUM SERPL-SCNC: 135 MMOL/L (ref 136–145)
TROPONIN I SERPL HS-MCNC: 31 NG/L (ref 0–76)
WBC # BLD AUTO: 7.6 K/UL (ref 4.1–11.1)

## 2025-02-12 PROCEDURE — 6360000002 HC RX W HCPCS: Performed by: EMERGENCY MEDICINE

## 2025-02-12 PROCEDURE — 84484 ASSAY OF TROPONIN QUANT: CPT

## 2025-02-12 PROCEDURE — 99284 EMERGENCY DEPT VISIT MOD MDM: CPT

## 2025-02-12 PROCEDURE — 93005 ELECTROCARDIOGRAM TRACING: CPT | Performed by: EMERGENCY MEDICINE

## 2025-02-12 PROCEDURE — 6370000000 HC RX 637 (ALT 250 FOR IP): Performed by: EMERGENCY MEDICINE

## 2025-02-12 PROCEDURE — 83690 ASSAY OF LIPASE: CPT

## 2025-02-12 PROCEDURE — 80053 COMPREHEN METABOLIC PANEL: CPT

## 2025-02-12 PROCEDURE — 36415 COLL VENOUS BLD VENIPUNCTURE: CPT

## 2025-02-12 PROCEDURE — 74176 CT ABD & PELVIS W/O CONTRAST: CPT

## 2025-02-12 PROCEDURE — 85025 COMPLETE CBC W/AUTO DIFF WBC: CPT

## 2025-02-12 PROCEDURE — 96374 THER/PROPH/DIAG INJ IV PUSH: CPT

## 2025-02-12 RX ORDER — HYDROMORPHONE HYDROCHLORIDE 1 MG/ML
1 INJECTION, SOLUTION INTRAMUSCULAR; INTRAVENOUS; SUBCUTANEOUS
Status: COMPLETED | OUTPATIENT
Start: 2025-02-12 | End: 2025-02-12

## 2025-02-12 RX ORDER — HYDROXYZINE HYDROCHLORIDE 25 MG/1
50 TABLET, FILM COATED ORAL
Status: COMPLETED | OUTPATIENT
Start: 2025-02-12 | End: 2025-02-12

## 2025-02-12 RX ADMIN — HYDROXYZINE HYDROCHLORIDE 50 MG: 25 TABLET, FILM COATED ORAL at 13:48

## 2025-02-12 RX ADMIN — HYDROMORPHONE HYDROCHLORIDE 1 MG: 1 INJECTION, SOLUTION INTRAMUSCULAR; INTRAVENOUS; SUBCUTANEOUS at 13:58

## 2025-02-12 ASSESSMENT — PAIN - FUNCTIONAL ASSESSMENT: PAIN_FUNCTIONAL_ASSESSMENT: 0-10

## 2025-02-12 ASSESSMENT — PAIN DESCRIPTION - DESCRIPTORS: DESCRIPTORS: CRAMPING

## 2025-02-12 ASSESSMENT — PAIN DESCRIPTION - ORIENTATION: ORIENTATION: LEFT

## 2025-02-12 ASSESSMENT — PAIN DESCRIPTION - PAIN TYPE: TYPE: ACUTE PAIN

## 2025-02-12 ASSESSMENT — PAIN DESCRIPTION - FREQUENCY: FREQUENCY: CONTINUOUS

## 2025-02-12 ASSESSMENT — PAIN DESCRIPTION - LOCATION: LOCATION: ABDOMEN

## 2025-02-12 NOTE — ED PROVIDER NOTES
tablet  Generic drug: cinacalcet     sertraline 50 MG tablet  Commonly known as: ZOLOFT     sevelamer 800 MG tablet  Commonly known as: RENVELA     sucralfate 1 GM tablet  Commonly known as: CARAFATE                DISCONTINUED MEDICATIONS:  Current Discharge Medication List          I am the Primary Clinician of Record.   Amarilis Topete MD (electronically signed)    (Please note that parts of this dictation were completed with voice recognition software. Quite often unanticipated grammatical, syntax, homophones, and other interpretive errors are inadvertently transcribed by the computer software. Please disregards these errors. Please excuse any errors that have escaped final proofreading.)         Amarilis Topete MD  02/12/25 [id number]

## 2025-02-13 LAB
EKG ATRIAL RATE: 88 BPM
EKG DIAGNOSIS: NORMAL
EKG P AXIS: 52 DEGREES
EKG P-R INTERVAL: 166 MS
EKG Q-T INTERVAL: 480 MS
EKG QRS DURATION: 156 MS
EKG QTC CALCULATION (BAZETT): 580 MS
EKG R AXIS: -88 DEGREES
EKG T AXIS: 68 DEGREES
EKG VENTRICULAR RATE: 88 BPM

## 2025-03-03 ENCOUNTER — HOSPITAL ENCOUNTER (EMERGENCY)
Facility: HOSPITAL | Age: 50
Discharge: HOME | End: 2025-03-03
Attending: STUDENT IN AN ORGANIZED HEALTH CARE EDUCATION/TRAINING PROGRAM
Payer: MEDICARE

## 2025-03-03 ENCOUNTER — APPOINTMENT (OUTPATIENT)
Facility: HOSPITAL | Age: 50
End: 2025-03-03
Payer: MEDICARE

## 2025-03-03 VITALS
TEMPERATURE: 97.5 F | SYSTOLIC BLOOD PRESSURE: 170 MMHG | BODY MASS INDEX: 20.07 KG/M2 | HEART RATE: 94 BPM | HEIGHT: 67 IN | DIASTOLIC BLOOD PRESSURE: 104 MMHG | OXYGEN SATURATION: 93 % | WEIGHT: 127.87 LBS | RESPIRATION RATE: 24 BRPM

## 2025-03-03 DIAGNOSIS — R10.84 GENERALIZED ABDOMINAL PAIN: Primary | ICD-10-CM

## 2025-03-03 DIAGNOSIS — R11.2 NAUSEA AND VOMITING, UNSPECIFIED VOMITING TYPE: ICD-10-CM

## 2025-03-03 LAB
ALBUMIN SERPL-MCNC: 3.7 G/DL (ref 3.5–5)
ALBUMIN/GLOB SERPL: 0.8 (ref 1.1–2.2)
ALP SERPL-CCNC: 632 U/L (ref 45–117)
ALT SERPL-CCNC: 11 U/L (ref 12–78)
ANION GAP SERPL CALC-SCNC: 12 MMOL/L (ref 2–12)
AST SERPL-CCNC: 28 U/L (ref 15–37)
BASOPHILS # BLD: 0.07 K/UL (ref 0–0.1)
BASOPHILS NFR BLD: 0.8 % (ref 0–1)
BILIRUB SERPL-MCNC: 0.6 MG/DL (ref 0.2–1)
BUN SERPL-MCNC: 45 MG/DL (ref 6–20)
BUN/CREAT SERPL: 5 (ref 12–20)
CALCIUM SERPL-MCNC: 9.7 MG/DL (ref 8.5–10.1)
CHLORIDE SERPL-SCNC: 94 MMOL/L (ref 97–108)
CO2 SERPL-SCNC: 26 MMOL/L (ref 21–32)
CREAT SERPL-MCNC: 8.58 MG/DL (ref 0.7–1.3)
DIFFERENTIAL METHOD BLD: ABNORMAL
EOSINOPHIL # BLD: 0.26 K/UL (ref 0–0.4)
EOSINOPHIL NFR BLD: 2.9 % (ref 0–7)
ERYTHROCYTE [DISTWIDTH] IN BLOOD BY AUTOMATED COUNT: 15 % (ref 11.5–14.5)
GLOBULIN SER CALC-MCNC: 4.6 G/DL (ref 2–4)
GLUCOSE SERPL-MCNC: 88 MG/DL (ref 65–100)
HCT VFR BLD AUTO: 31 % (ref 36.6–50.3)
HGB BLD-MCNC: 10 G/DL (ref 12.1–17)
IMM GRANULOCYTES # BLD AUTO: 0.03 K/UL (ref 0–0.04)
IMM GRANULOCYTES NFR BLD AUTO: 0.3 % (ref 0–0.5)
LYMPHOCYTES # BLD: 1.04 K/UL (ref 0.8–3.5)
LYMPHOCYTES NFR BLD: 11.6 % (ref 12–49)
MCH RBC QN AUTO: 31.2 PG (ref 26–34)
MCHC RBC AUTO-ENTMCNC: 32.3 G/DL (ref 30–36.5)
MCV RBC AUTO: 96.6 FL (ref 80–99)
MONOCYTES # BLD: 0.94 K/UL (ref 0–1)
MONOCYTES NFR BLD: 10.5 % (ref 5–13)
NEUTS SEG # BLD: 6.64 K/UL (ref 1.8–8)
NEUTS SEG NFR BLD: 73.9 % (ref 32–75)
NRBC # BLD: 0 K/UL (ref 0–0.01)
NRBC BLD-RTO: 0 PER 100 WBC
PLATELET # BLD AUTO: 231 K/UL (ref 150–400)
PMV BLD AUTO: 11.3 FL (ref 8.9–12.9)
POTASSIUM SERPL-SCNC: 4.6 MMOL/L (ref 3.5–5.1)
PROT SERPL-MCNC: 8.3 G/DL (ref 6.4–8.2)
RBC # BLD AUTO: 3.21 M/UL (ref 4.1–5.7)
SODIUM SERPL-SCNC: 132 MMOL/L (ref 136–145)
WBC # BLD AUTO: 9 K/UL (ref 4.1–11.1)

## 2025-03-03 PROCEDURE — 74176 CT ABD & PELVIS W/O CONTRAST: CPT

## 2025-03-03 PROCEDURE — 96374 THER/PROPH/DIAG INJ IV PUSH: CPT

## 2025-03-03 PROCEDURE — 80053 COMPREHEN METABOLIC PANEL: CPT

## 2025-03-03 PROCEDURE — 85025 COMPLETE CBC W/AUTO DIFF WBC: CPT

## 2025-03-03 PROCEDURE — 96376 TX/PRO/DX INJ SAME DRUG ADON: CPT

## 2025-03-03 PROCEDURE — 6360000002 HC RX W HCPCS: Performed by: EMERGENCY MEDICINE

## 2025-03-03 PROCEDURE — 6370000000 HC RX 637 (ALT 250 FOR IP): Performed by: STUDENT IN AN ORGANIZED HEALTH CARE EDUCATION/TRAINING PROGRAM

## 2025-03-03 PROCEDURE — 36415 COLL VENOUS BLD VENIPUNCTURE: CPT

## 2025-03-03 PROCEDURE — 99284 EMERGENCY DEPT VISIT MOD MDM: CPT

## 2025-03-03 PROCEDURE — 6360000002 HC RX W HCPCS: Performed by: STUDENT IN AN ORGANIZED HEALTH CARE EDUCATION/TRAINING PROGRAM

## 2025-03-03 RX ORDER — HYDROMORPHONE HYDROCHLORIDE 1 MG/ML
1 INJECTION, SOLUTION INTRAMUSCULAR; INTRAVENOUS; SUBCUTANEOUS
Status: COMPLETED | OUTPATIENT
Start: 2025-03-03 | End: 2025-03-03

## 2025-03-03 RX ORDER — HYDROXYZINE HYDROCHLORIDE 25 MG/1
50 TABLET, FILM COATED ORAL 3 TIMES DAILY PRN
Status: DISCONTINUED | OUTPATIENT
Start: 2025-03-03 | End: 2025-03-03 | Stop reason: HOSPADM

## 2025-03-03 RX ORDER — HYDROMORPHONE HYDROCHLORIDE 1 MG/ML
1 INJECTION, SOLUTION INTRAMUSCULAR; INTRAVENOUS; SUBCUTANEOUS ONCE
Status: COMPLETED | OUTPATIENT
Start: 2025-03-03 | End: 2025-03-03

## 2025-03-03 RX ADMIN — HYDROXYZINE HYDROCHLORIDE 50 MG: 25 TABLET, FILM COATED ORAL at 06:07

## 2025-03-03 RX ADMIN — HYDROMORPHONE HYDROCHLORIDE 1 MG: 1 INJECTION, SOLUTION INTRAMUSCULAR; INTRAVENOUS; SUBCUTANEOUS at 07:49

## 2025-03-03 RX ADMIN — HYDROMORPHONE HYDROCHLORIDE 1 MG: 1 INJECTION, SOLUTION INTRAMUSCULAR; INTRAVENOUS; SUBCUTANEOUS at 06:13

## 2025-03-03 ASSESSMENT — PAIN SCALES - GENERAL
PAINLEVEL_OUTOF10: 7
PAINLEVEL_OUTOF10: 1
PAINLEVEL_OUTOF10: 7

## 2025-03-03 NOTE — DISCHARGE INSTRUCTIONS
You are seen the emergency department for your symptoms.  Please continue to take your MiraLAX to help with soft stool.  Please follow-up with your primary care doctor.  Please return for new or worsening symptoms anytime.

## 2025-03-03 NOTE — ED NOTES
Assumed care of pt at this time. Bedside report received from JOJO Kinsey. Pt resting in bed on monitor x3 with call bell in reach. Reports pain is still a 7/10 and that he required 2 doses of dilaudid last time he was here.

## 2025-03-03 NOTE — ED PROVIDER NOTES
[facility] EMERGENCY DEPT  EMERGENCY DEPARTMENT HISTORY AND PHYSICAL EXAM      Date: 3/3/2025  Patient Name: Claude E Gary  MRN: [id number]  Birthdate 1975  Date of evaluation: 3/3/2025  Provider: Waqas Camarillo MD   Note Started: 6:38 AM EST 3/3/25    HISTORY OF PRESENT ILLNESS     Chief Complaint   Patient presents with    Abdominal Pain     Pt arrives w cc of 7/10 sharp LLQ abd pain, nausea vomiting since 1am. Denies diarrhea, fevers. Pt reports he does not make urine, gets HD tues/thurs/Saturday and has not missed any treatments       History Provided By: Patient    HPI: Claude E Gary is a 49 y.o. male PMH as below presenting with acute on chronic abdominal pain worse in the left lower quadrant associate with nausea vomiting.  Patient's recently admitted for fluid overload and received dialysis at [facility] and has since been discharged follows up with his regular dialysis and had a full session on Saturday 2 days ago.  Next session is tomorrow.  He reports worsening acute on chronic abdominal pain for which his oxycodone is not working.  He reports he only takes Atarax for nausea as he has long QT    PAST MEDICAL HISTORY   Past Medical History:  Past Medical History:   Diagnosis Date    AICD (automatic cardioverter/defibrillator) present     Below knee amputation ([facility]) 05/28/2023    left    Bleeding ulcer     CAD (coronary artery disease)     anterior MI s/p 2 stents  2007    Constipation     DVT (deep venous thrombosis) ([facility]) 2001    in LLE \"multiple times\"    Endocarditis     ESRD on dialysis ([facility])     Home [facility] home x5 days week M-F does labs    Gallstone pancreatitis     GERD (gastroesophageal reflux disease)     High cholesterol     History of blood transfusion     HIT (heparin-induced thrombocytopenia)     Hypertension     Kidney transplanted     x2    Pacemaker     Peritonitis ([facility])     Seizures ([facility]) 12/19/2023    last seizure \"over 2-3 years\"    Small bowel obstruction ([facility])

## 2025-03-03 NOTE — ED NOTES
Pt wheeled out to waiting room to wait for ride with paperwork in hand. DC instructions given. Pt able to ambulate to wheelchair without difficulty.

## 2025-03-24 ENCOUNTER — APPOINTMENT (OUTPATIENT)
Facility: HOSPITAL | Age: 50
End: 2025-03-24
Payer: MEDICARE

## 2025-03-24 ENCOUNTER — HOSPITAL ENCOUNTER (EMERGENCY)
Facility: HOSPITAL | Age: 50
Discharge: HOME | End: 2025-03-24
Attending: EMERGENCY MEDICINE
Payer: MEDICARE

## 2025-03-24 VITALS
WEIGHT: 125.66 LBS | HEART RATE: 85 BPM | DIASTOLIC BLOOD PRESSURE: 85 MMHG | BODY MASS INDEX: 19.68 KG/M2 | RESPIRATION RATE: 18 BRPM | OXYGEN SATURATION: 93 % | SYSTOLIC BLOOD PRESSURE: 115 MMHG | TEMPERATURE: 97.7 F

## 2025-03-24 DIAGNOSIS — R10.32 ABDOMINAL PAIN, LEFT LOWER QUADRANT: Primary | ICD-10-CM

## 2025-03-24 LAB
ALBUMIN SERPL-MCNC: 3.6 G/DL (ref 3.5–5)
ALBUMIN/GLOB SERPL: 0.8 (ref 1.1–2.2)
ALP SERPL-CCNC: 544 U/L (ref 45–117)
ALT SERPL-CCNC: 12 U/L (ref 12–78)
ANION GAP SERPL CALC-SCNC: 8 MMOL/L (ref 2–12)
AST SERPL-CCNC: 14 U/L (ref 15–37)
BASOPHILS # BLD: 0.08 K/UL (ref 0–0.1)
BASOPHILS NFR BLD: 1 % (ref 0–1)
BILIRUB SERPL-MCNC: 0.5 MG/DL (ref 0.2–1)
BUN SERPL-MCNC: 29 MG/DL (ref 6–20)
BUN/CREAT SERPL: 4 (ref 12–20)
CALCIUM SERPL-MCNC: 9.3 MG/DL (ref 8.5–10.1)
CHLORIDE SERPL-SCNC: 96 MMOL/L (ref 97–108)
CO2 SERPL-SCNC: 33 MMOL/L (ref 21–32)
CREAT SERPL-MCNC: 7.18 MG/DL (ref 0.7–1.3)
DIFFERENTIAL METHOD BLD: ABNORMAL
EKG ATRIAL RATE: 86 BPM
EKG DIAGNOSIS: NORMAL
EKG P AXIS: 87 DEGREES
EKG P-R INTERVAL: 196 MS
EKG Q-T INTERVAL: 486 MS
EKG QRS DURATION: 172 MS
EKG QTC CALCULATION (BAZETT): 581 MS
EKG R AXIS: -85 DEGREES
EKG T AXIS: 92 DEGREES
EKG VENTRICULAR RATE: 86 BPM
EOSINOPHIL # BLD: 0.35 K/UL (ref 0–0.4)
EOSINOPHIL NFR BLD: 4.4 % (ref 0–7)
ERYTHROCYTE [DISTWIDTH] IN BLOOD BY AUTOMATED COUNT: 14 % (ref 11.5–14.5)
GLOBULIN SER CALC-MCNC: 4.6 G/DL (ref 2–4)
GLUCOSE SERPL-MCNC: 86 MG/DL (ref 65–100)
HCT VFR BLD AUTO: 35.1 % (ref 36.6–50.3)
HGB BLD-MCNC: 11 G/DL (ref 12.1–17)
IMM GRANULOCYTES # BLD AUTO: 0.03 K/UL (ref 0–0.04)
IMM GRANULOCYTES NFR BLD AUTO: 0.4 % (ref 0–0.5)
LIPASE SERPL-CCNC: 92 U/L (ref 13–75)
LYMPHOCYTES # BLD: 1.15 K/UL (ref 0.8–3.5)
LYMPHOCYTES NFR BLD: 14.3 % (ref 12–49)
MCH RBC QN AUTO: 30.6 PG (ref 26–34)
MCHC RBC AUTO-ENTMCNC: 31.3 G/DL (ref 30–36.5)
MCV RBC AUTO: 97.8 FL (ref 80–99)
MONOCYTES # BLD: 0.83 K/UL (ref 0–1)
MONOCYTES NFR BLD: 10.3 % (ref 5–13)
NEUTS SEG # BLD: 5.6 K/UL (ref 1.8–8)
NEUTS SEG NFR BLD: 69.6 % (ref 32–75)
NRBC # BLD: 0 K/UL (ref 0–0.01)
NRBC BLD-RTO: 0 PER 100 WBC
PLATELET # BLD AUTO: 217 K/UL (ref 150–400)
PMV BLD AUTO: 11.4 FL (ref 8.9–12.9)
POTASSIUM SERPL-SCNC: 3.4 MMOL/L (ref 3.5–5.1)
PROT SERPL-MCNC: 8.2 G/DL (ref 6.4–8.2)
RBC # BLD AUTO: 3.59 M/UL (ref 4.1–5.7)
SODIUM SERPL-SCNC: 137 MMOL/L (ref 136–145)
WBC # BLD AUTO: 8 K/UL (ref 4.1–11.1)

## 2025-03-24 PROCEDURE — 36415 COLL VENOUS BLD VENIPUNCTURE: CPT

## 2025-03-24 PROCEDURE — 96376 TX/PRO/DX INJ SAME DRUG ADON: CPT

## 2025-03-24 PROCEDURE — 6360000002 HC RX W HCPCS: Performed by: EMERGENCY MEDICINE

## 2025-03-24 PROCEDURE — 74176 CT ABD & PELVIS W/O CONTRAST: CPT

## 2025-03-24 PROCEDURE — 96374 THER/PROPH/DIAG INJ IV PUSH: CPT

## 2025-03-24 PROCEDURE — 99284 EMERGENCY DEPT VISIT MOD MDM: CPT

## 2025-03-24 PROCEDURE — 6370000000 HC RX 637 (ALT 250 FOR IP): Performed by: EMERGENCY MEDICINE

## 2025-03-24 PROCEDURE — 85025 COMPLETE CBC W/AUTO DIFF WBC: CPT

## 2025-03-24 PROCEDURE — 80053 COMPREHEN METABOLIC PANEL: CPT

## 2025-03-24 PROCEDURE — 83690 ASSAY OF LIPASE: CPT

## 2025-03-24 PROCEDURE — 93005 ELECTROCARDIOGRAM TRACING: CPT | Performed by: EMERGENCY MEDICINE

## 2025-03-24 RX ORDER — HYDROMORPHONE HYDROCHLORIDE 1 MG/ML
1 INJECTION, SOLUTION INTRAMUSCULAR; INTRAVENOUS; SUBCUTANEOUS
Status: COMPLETED | OUTPATIENT
Start: 2025-03-24 | End: 2025-03-24

## 2025-03-24 RX ORDER — HYDROXYZINE HYDROCHLORIDE 25 MG/1
50 TABLET, FILM COATED ORAL ONCE
Status: COMPLETED | OUTPATIENT
Start: 2025-03-24 | End: 2025-03-24

## 2025-03-24 RX ORDER — HYDROMORPHONE HYDROCHLORIDE 1 MG/ML
1 INJECTION, SOLUTION INTRAMUSCULAR; INTRAVENOUS; SUBCUTANEOUS ONCE
Status: COMPLETED | OUTPATIENT
Start: 2025-03-24 | End: 2025-03-24

## 2025-03-24 RX ADMIN — HYDROXYZINE HYDROCHLORIDE 50 MG: 25 TABLET, FILM COATED ORAL at 06:43

## 2025-03-24 RX ADMIN — HYDROMORPHONE HYDROCHLORIDE 1 MG: 1 INJECTION, SOLUTION INTRAMUSCULAR; INTRAVENOUS; SUBCUTANEOUS at 06:43

## 2025-03-24 RX ADMIN — HYDROMORPHONE HYDROCHLORIDE 1 MG: 1 INJECTION, SOLUTION INTRAMUSCULAR; INTRAVENOUS; SUBCUTANEOUS at 07:38

## 2025-03-24 ASSESSMENT — PAIN DESCRIPTION - LOCATION: LOCATION: ABDOMEN

## 2025-03-24 ASSESSMENT — PAIN - FUNCTIONAL ASSESSMENT: PAIN_FUNCTIONAL_ASSESSMENT: 0-10

## 2025-03-24 ASSESSMENT — PAIN SCALES - GENERAL: PAINLEVEL_OUTOF10: 7

## 2025-03-24 NOTE — ED PROVIDER NOTES
[facility] EMERGENCY DEPARTMENT  EMERGENCY DEPARTMENT ENCOUNTER       Pt Name: Claude E Gary  MRN: [id number]  Birthdate 1975  Date of evaluation: 3/24/2025  Provider: Lior Damian MD   PCP: Tramaine Rodas MD  Note Started: 6:11 AM 3/24/25     CHIEF COMPLAINT       Chief Complaint   Patient presents with    Vomiting    Abdominal Pain        HISTORY OF PRESENT ILLNESS: 1 or more elements      History From: Patient, History limited by: None     Claude E Gary is a 49 y.o. male with a history of ESRD, chronic abdominal pain who presents with abdominal pain. He reprots onset of LLQ abdominal pain with nausea and vomiting at 0300. Normal bowel movements, most recent Sunday without blood.      Nursing Notes were all reviewed and agreed with or any disagreements were addressed in the HPI.     REVIEW OF SYSTEMS        Positives and Pertinent negatives as per HPI.    PAST HISTORY     Past Medical History:  Past Medical History:   Diagnosis Date    AICD (automatic cardioverter/defibrillator) present     Below knee amputation ([facility]) 05/28/2023    left    Bleeding ulcer     CAD (coronary artery disease)     anterior MI s/p 2 stents  2007    Constipation     DVT (deep venous thrombosis) ([facility]) 2001    in LLE \"multiple times\"    Endocarditis     ESRD on dialysis ([facility])     Home [facility] home x5 days week M-F does labs    Gallstone pancreatitis     GERD (gastroesophageal reflux disease)     High cholesterol     History of blood transfusion     HIT (heparin-induced thrombocytopenia)     Hypertension     Kidney transplanted     x2    Pacemaker     Peritonitis ([facility])     Seizures ([facility]) 12/19/2023    last seizure \"over 2-3 years\"    Small bowel obstruction ([facility])     Thrombocytopenia     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       Past Surgical History:  Past Surgical History:   Procedure Laterality Date    APPENDECTOMY      CHOLECYSTECTOMY      COLONOSCOPY N/A 12/20/2023    COLONOSCOPY WITH BIOPSY, EGD WITH BAND

## 2025-03-24 NOTE — ED NOTES
DC papers reviewed and in hand, pt verbalized understanding. Patient provided wheelchair out of ED, no acute distress noted.

## 2025-03-25 ENCOUNTER — HOSPITAL ENCOUNTER (EMERGENCY)
Facility: HOSPITAL | Age: 50
Discharge: HOME | End: 2025-03-26
Attending: EMERGENCY MEDICINE
Payer: MEDICARE

## 2025-03-25 DIAGNOSIS — R10.9 CHRONIC ABDOMINAL PAIN: Primary | ICD-10-CM

## 2025-03-25 DIAGNOSIS — G89.29 CHRONIC ABDOMINAL PAIN: Primary | ICD-10-CM

## 2025-03-25 DIAGNOSIS — N18.6 ESRD (END STAGE RENAL DISEASE) ON DIALYSIS (HCC): ICD-10-CM

## 2025-03-25 DIAGNOSIS — Z99.2 ESRD (END STAGE RENAL DISEASE) ON DIALYSIS (HCC): ICD-10-CM

## 2025-03-25 DIAGNOSIS — F11.29 OPIOID DEPENDENCE WITH OPIOID-INDUCED DISORDER (HCC): ICD-10-CM

## 2025-03-25 LAB
ALBUMIN SERPL-MCNC: 3.8 G/DL (ref 3.5–5)
ALBUMIN/GLOB SERPL: 0.8 (ref 1.1–2.2)
ALP SERPL-CCNC: 572 U/L (ref 45–117)
ALT SERPL-CCNC: 12 U/L (ref 12–78)
ANION GAP SERPL CALC-SCNC: 8 MMOL/L (ref 2–12)
AST SERPL-CCNC: 22 U/L (ref 15–37)
BASOPHILS # BLD: 0.09 K/UL (ref 0–0.1)
BASOPHILS NFR BLD: 0.9 % (ref 0–1)
BILIRUB SERPL-MCNC: 0.4 MG/DL (ref 0.2–1)
BUN SERPL-MCNC: 14 MG/DL (ref 6–20)
BUN/CREAT SERPL: 3 (ref 12–20)
CALCIUM SERPL-MCNC: 9.8 MG/DL (ref 8.5–10.1)
CHLORIDE SERPL-SCNC: 98 MMOL/L (ref 97–108)
CO2 SERPL-SCNC: 31 MMOL/L (ref 21–32)
CREAT SERPL-MCNC: 4.34 MG/DL (ref 0.7–1.3)
DIFFERENTIAL METHOD BLD: ABNORMAL
EOSINOPHIL # BLD: 0.51 K/UL (ref 0–0.4)
EOSINOPHIL NFR BLD: 5.3 % (ref 0–7)
ERYTHROCYTE [DISTWIDTH] IN BLOOD BY AUTOMATED COUNT: 14.8 % (ref 11.5–14.5)
GLOBULIN SER CALC-MCNC: 4.9 G/DL (ref 2–4)
GLUCOSE SERPL-MCNC: 90 MG/DL (ref 65–100)
HCT VFR BLD AUTO: 33.7 % (ref 36.6–50.3)
HGB BLD-MCNC: 10.8 G/DL (ref 12.1–17)
IMM GRANULOCYTES # BLD AUTO: 0.02 K/UL (ref 0–0.04)
IMM GRANULOCYTES NFR BLD AUTO: 0.2 % (ref 0–0.5)
LIPASE SERPL-CCNC: 116 U/L (ref 13–75)
LYMPHOCYTES # BLD: 1.29 K/UL (ref 0.8–3.5)
LYMPHOCYTES NFR BLD: 13.3 % (ref 12–49)
MCH RBC QN AUTO: 31.3 PG (ref 26–34)
MCHC RBC AUTO-ENTMCNC: 32 G/DL (ref 30–36.5)
MCV RBC AUTO: 97.7 FL (ref 80–99)
MONOCYTES # BLD: 0.9 K/UL (ref 0–1)
MONOCYTES NFR BLD: 9.3 % (ref 5–13)
NEUTS SEG # BLD: 6.87 K/UL (ref 1.8–8)
NEUTS SEG NFR BLD: 71 % (ref 32–75)
NRBC # BLD: 0 K/UL (ref 0–0.01)
NRBC BLD-RTO: 0 PER 100 WBC
PLATELET # BLD AUTO: 190 K/UL (ref 150–400)
PMV BLD AUTO: 11 FL (ref 8.9–12.9)
POTASSIUM SERPL-SCNC: 3.1 MMOL/L (ref 3.5–5.1)
PROT SERPL-MCNC: 8.7 G/DL (ref 6.4–8.2)
RBC # BLD AUTO: 3.45 M/UL (ref 4.1–5.7)
SODIUM SERPL-SCNC: 137 MMOL/L (ref 136–145)
WBC # BLD AUTO: 9.7 K/UL (ref 4.1–11.1)

## 2025-03-25 PROCEDURE — 83690 ASSAY OF LIPASE: CPT

## 2025-03-25 PROCEDURE — 85025 COMPLETE CBC W/AUTO DIFF WBC: CPT

## 2025-03-25 PROCEDURE — 99284 EMERGENCY DEPT VISIT MOD MDM: CPT

## 2025-03-25 PROCEDURE — 80053 COMPREHEN METABOLIC PANEL: CPT

## 2025-03-25 PROCEDURE — 36415 COLL VENOUS BLD VENIPUNCTURE: CPT

## 2025-03-25 PROCEDURE — 93005 ELECTROCARDIOGRAM TRACING: CPT | Performed by: EMERGENCY MEDICINE

## 2025-03-25 RX ORDER — HYDROXYZINE HYDROCHLORIDE 25 MG/1
50 TABLET, FILM COATED ORAL
Status: COMPLETED | OUTPATIENT
Start: 2025-03-25 | End: 2025-03-26

## 2025-03-25 RX ORDER — PROMETHAZINE HYDROCHLORIDE 25 MG/1
25 SUPPOSITORY RECTAL
Status: DISCONTINUED | OUTPATIENT
Start: 2025-03-25 | End: 2025-03-26 | Stop reason: HOSPADM

## 2025-03-25 ASSESSMENT — PAIN SCALES - GENERAL: PAINLEVEL_OUTOF10: 7

## 2025-03-25 ASSESSMENT — PAIN DESCRIPTION - ORIENTATION: ORIENTATION: LEFT

## 2025-03-25 ASSESSMENT — PAIN - FUNCTIONAL ASSESSMENT: PAIN_FUNCTIONAL_ASSESSMENT: 0-10

## 2025-03-25 ASSESSMENT — PAIN DESCRIPTION - LOCATION: LOCATION: ABDOMEN

## 2025-03-26 VITALS
WEIGHT: 126.1 LBS | DIASTOLIC BLOOD PRESSURE: 87 MMHG | OXYGEN SATURATION: 95 % | SYSTOLIC BLOOD PRESSURE: 113 MMHG | HEIGHT: 67 IN | RESPIRATION RATE: 46 BRPM | HEART RATE: 92 BPM | BODY MASS INDEX: 19.79 KG/M2 | TEMPERATURE: 98.2 F

## 2025-03-26 PROCEDURE — 6370000000 HC RX 637 (ALT 250 FOR IP): Performed by: EMERGENCY MEDICINE

## 2025-03-26 RX ORDER — HYDROMORPHONE HYDROCHLORIDE 2 MG/1
1 TABLET ORAL
Refills: 0 | Status: COMPLETED | OUTPATIENT
Start: 2025-03-26 | End: 2025-03-26

## 2025-03-26 RX ORDER — PROMETHAZINE HYDROCHLORIDE 25 MG/1
25 SUPPOSITORY RECTAL EVERY 6 HOURS PRN
Qty: 10 SUPPOSITORY | Refills: 0 | Status: SHIPPED | OUTPATIENT
Start: 2025-03-26 | End: 2025-04-02

## 2025-03-26 RX ADMIN — HYDROMORPHONE HYDROCHLORIDE 1 MG: 2 TABLET ORAL at 00:58

## 2025-03-26 RX ADMIN — HYDROXYZINE HYDROCHLORIDE 50 MG: 25 TABLET, FILM COATED ORAL at 00:02

## 2025-03-26 ASSESSMENT — ENCOUNTER SYMPTOMS
SHORTNESS OF BREATH: 0
DIARRHEA: 0
NAUSEA: 1
VOMITING: 0
ABDOMINAL PAIN: 1

## 2025-03-26 NOTE — ED PROVIDER NOTES
1.25 MG (15362 UT) capsule Take 1 capsule by mouth every 7 daysHistorical Med      pantoprazole (PROTONIX) 40 MG tablet Take 1 tablet by mouth 2 times daily (before meals)Historical Med      sevelamer (RENVELA) 800 MG tablet Take 2 tablets by mouth 3 times dailyHistorical Med      sucralfate (CARAFATE) 1 GM tablet Take 1 tablet by mouth 4 times daily (before meals and nightly)Historical Med             PAST HISTORY       Past Medical History:  Past Medical History:   Diagnosis Date    AICD (automatic cardioverter/defibrillator) present     Below knee amputation ([facility]) 05/28/2023    left    Bleeding ulcer     CAD (coronary artery disease)     anterior MI s/p 2 stents  2007    Constipation     DVT (deep venous thrombosis) ([facility]) 2001    in LLE \"multiple times\"    Endocarditis     ESRD on dialysis ([facility])     Home [facility] home x5 days week M-F does labs    Gallstone pancreatitis     GERD (gastroesophageal reflux disease)     High cholesterol     History of blood transfusion     HIT (heparin-induced thrombocytopenia)     Hypertension     Kidney transplanted     x2    Pacemaker     Peritonitis ([facility])     Seizures ([facility]) 12/19/2023    last seizure \"over 2-3 years\"    Small bowel obstruction ([facility])     Thrombocytopenia     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       Past Surgical History:  Past Surgical History:   Procedure Laterality Date    APPENDECTOMY      CHOLECYSTECTOMY      COLONOSCOPY N/A 12/20/2023    COLONOSCOPY WITH BIOPSY, EGD WITH BAND LIGATION OF ESOPHAGEAL VARICES performed by Maco Chavez Jr., MD at [facility] ENDOSCOPY    DIALYSIS FISTULA CREATION Left 9/14/2023    LEFT ARM ARTERIO VENOUS GRAFT (AXILLARY BLOCK) performed by Lior Amador MD at [facility] MAIN OR    EDG US EXAM SURGICAL ALTER STOM DUODENUM/JEJUNUM  7/15/2011         EMBOLECTOMY Left 5/18/2023    THROMBECTOMY AND REVISION OF LEFT LEG FEMORAL TO TIBIAL BYPASS, LEFT LEG ANGIOGRAM performed by Lior Amador MD at [facility] MAIN OR

## 2025-03-27 LAB
EKG ATRIAL RATE: 92 BPM
EKG DIAGNOSIS: NORMAL
EKG P AXIS: 45 DEGREES
EKG P-R INTERVAL: 164 MS
EKG Q-T INTERVAL: 468 MS
EKG QRS DURATION: 162 MS
EKG QTC CALCULATION (BAZETT): 578 MS
EKG R AXIS: 266 DEGREES
EKG T AXIS: 72 DEGREES
EKG VENTRICULAR RATE: 92 BPM

## 2025-03-31 ENCOUNTER — APPOINTMENT (OUTPATIENT)
Facility: HOSPITAL | Age: 50
End: 2025-03-31
Payer: MEDICARE

## 2025-03-31 ENCOUNTER — HOSPITAL ENCOUNTER (EMERGENCY)
Facility: HOSPITAL | Age: 50
Discharge: HOME | End: 2025-03-31
Payer: MEDICARE

## 2025-03-31 VITALS
HEART RATE: 81 BPM | OXYGEN SATURATION: 100 % | TEMPERATURE: 98.7 F | BODY MASS INDEX: 19.79 KG/M2 | SYSTOLIC BLOOD PRESSURE: 133 MMHG | RESPIRATION RATE: 15 BRPM | WEIGHT: 126.1 LBS | DIASTOLIC BLOOD PRESSURE: 84 MMHG | HEIGHT: 67 IN

## 2025-03-31 DIAGNOSIS — R10.32 ABDOMINAL PAIN, CHRONIC, LEFT LOWER QUADRANT: Primary | ICD-10-CM

## 2025-03-31 DIAGNOSIS — G89.29 ABDOMINAL PAIN, CHRONIC, LEFT LOWER QUADRANT: Primary | ICD-10-CM

## 2025-03-31 DIAGNOSIS — F11.29 OPIOID DEPENDENCE WITH OPIOID-INDUCED DISORDER (HCC): ICD-10-CM

## 2025-03-31 LAB
ALBUMIN SERPL-MCNC: 3.5 G/DL (ref 3.5–5)
ALBUMIN/GLOB SERPL: 0.7 (ref 1.1–2.2)
ALP SERPL-CCNC: 508 U/L (ref 45–117)
ALT SERPL-CCNC: 11 U/L (ref 12–78)
ANION GAP SERPL CALC-SCNC: 10 MMOL/L (ref 2–12)
AST SERPL-CCNC: 13 U/L (ref 15–37)
BASOPHILS # BLD: 0.1 K/UL (ref 0–0.1)
BASOPHILS NFR BLD: 1.1 % (ref 0–1)
BILIRUB SERPL-MCNC: 0.5 MG/DL (ref 0.2–1)
BUN SERPL-MCNC: 29 MG/DL (ref 6–20)
BUN/CREAT SERPL: 5 (ref 12–20)
CALCIUM SERPL-MCNC: 9.5 MG/DL (ref 8.5–10.1)
CHLORIDE SERPL-SCNC: 96 MMOL/L (ref 97–108)
CO2 SERPL-SCNC: 30 MMOL/L (ref 21–32)
CREAT SERPL-MCNC: 6.19 MG/DL (ref 0.7–1.3)
DIFFERENTIAL METHOD BLD: ABNORMAL
EOSINOPHIL # BLD: 0.38 K/UL (ref 0–0.4)
EOSINOPHIL NFR BLD: 4.3 % (ref 0–7)
ERYTHROCYTE [DISTWIDTH] IN BLOOD BY AUTOMATED COUNT: 15.9 % (ref 11.5–14.5)
GLOBULIN SER CALC-MCNC: 4.8 G/DL (ref 2–4)
GLUCOSE SERPL-MCNC: 74 MG/DL (ref 65–100)
HCT VFR BLD AUTO: 31.1 % (ref 36.6–50.3)
HGB BLD-MCNC: 9.7 G/DL (ref 12.1–17)
IMM GRANULOCYTES # BLD AUTO: 0.02 K/UL (ref 0–0.04)
IMM GRANULOCYTES NFR BLD AUTO: 0.2 % (ref 0–0.5)
LACTATE BLD-SCNC: 1.16 MMOL/L (ref 0.4–2)
LIPASE SERPL-CCNC: 100 U/L (ref 13–75)
LYMPHOCYTES # BLD: 1.6 K/UL (ref 0.8–3.5)
LYMPHOCYTES NFR BLD: 18 % (ref 12–49)
MAGNESIUM SERPL-MCNC: 1.9 MG/DL (ref 1.6–2.4)
MCH RBC QN AUTO: 30.6 PG (ref 26–34)
MCHC RBC AUTO-ENTMCNC: 31.2 G/DL (ref 30–36.5)
MCV RBC AUTO: 98.1 FL (ref 80–99)
MONOCYTES # BLD: 0.9 K/UL (ref 0–1)
MONOCYTES NFR BLD: 10.1 % (ref 5–13)
NEUTS SEG # BLD: 5.91 K/UL (ref 1.8–8)
NEUTS SEG NFR BLD: 66.3 % (ref 32–75)
NRBC # BLD: 0 K/UL (ref 0–0.01)
NRBC BLD-RTO: 0 PER 100 WBC
NT PRO BNP: 8562 PG/ML
PLATELET # BLD AUTO: 158 K/UL (ref 150–400)
PMV BLD AUTO: 10.7 FL (ref 8.9–12.9)
POTASSIUM SERPL-SCNC: 3.3 MMOL/L (ref 3.5–5.1)
PROT SERPL-MCNC: 8.3 G/DL (ref 6.4–8.2)
RBC # BLD AUTO: 3.17 M/UL (ref 4.1–5.7)
SODIUM SERPL-SCNC: 136 MMOL/L (ref 136–145)
WBC # BLD AUTO: 8.9 K/UL (ref 4.1–11.1)

## 2025-03-31 PROCEDURE — 85025 COMPLETE CBC W/AUTO DIFF WBC: CPT

## 2025-03-31 PROCEDURE — 83605 ASSAY OF LACTIC ACID: CPT

## 2025-03-31 PROCEDURE — 6370000000 HC RX 637 (ALT 250 FOR IP)

## 2025-03-31 PROCEDURE — 71045 X-RAY EXAM CHEST 1 VIEW: CPT

## 2025-03-31 PROCEDURE — 83735 ASSAY OF MAGNESIUM: CPT

## 2025-03-31 PROCEDURE — 74176 CT ABD & PELVIS W/O CONTRAST: CPT

## 2025-03-31 PROCEDURE — 83690 ASSAY OF LIPASE: CPT

## 2025-03-31 PROCEDURE — 80053 COMPREHEN METABOLIC PANEL: CPT

## 2025-03-31 PROCEDURE — 93005 ELECTROCARDIOGRAM TRACING: CPT | Performed by: STUDENT IN AN ORGANIZED HEALTH CARE EDUCATION/TRAINING PROGRAM

## 2025-03-31 PROCEDURE — 36415 COLL VENOUS BLD VENIPUNCTURE: CPT

## 2025-03-31 PROCEDURE — 83880 ASSAY OF NATRIURETIC PEPTIDE: CPT

## 2025-03-31 PROCEDURE — 99285 EMERGENCY DEPT VISIT HI MDM: CPT

## 2025-03-31 RX ORDER — HYDROXYZINE HYDROCHLORIDE 25 MG/1
25 TABLET, FILM COATED ORAL
Status: COMPLETED | OUTPATIENT
Start: 2025-03-31 | End: 2025-03-31

## 2025-03-31 RX ORDER — HYDROMORPHONE HYDROCHLORIDE 2 MG/1
1 TABLET ORAL
Refills: 0 | Status: COMPLETED | OUTPATIENT
Start: 2025-03-31 | End: 2025-03-31

## 2025-03-31 RX ADMIN — HYDROMORPHONE HYDROCHLORIDE 1 MG: 2 TABLET ORAL at 01:20

## 2025-03-31 RX ADMIN — HYDROMORPHONE HYDROCHLORIDE 1 MG: 2 TABLET ORAL at 02:19

## 2025-03-31 RX ADMIN — HYDROXYZINE HYDROCHLORIDE 25 MG: 25 TABLET, FILM COATED ORAL at 01:20

## 2025-03-31 ASSESSMENT — LIFESTYLE VARIABLES
HOW OFTEN DO YOU HAVE A DRINK CONTAINING ALCOHOL: PATIENT DECLINED
HOW MANY STANDARD DRINKS CONTAINING ALCOHOL DO YOU HAVE ON A TYPICAL DAY: PATIENT DECLINED

## 2025-03-31 ASSESSMENT — PAIN SCALES - GENERAL
PAINLEVEL_OUTOF10: 10
PAINLEVEL_OUTOF10: 7
PAINLEVEL_OUTOF10: 7

## 2025-03-31 ASSESSMENT — PAIN - FUNCTIONAL ASSESSMENT: PAIN_FUNCTIONAL_ASSESSMENT: 0-10

## 2025-03-31 ASSESSMENT — PAIN DESCRIPTION - LOCATION
LOCATION: ABDOMEN
LOCATION: ABDOMEN

## 2025-03-31 ASSESSMENT — PAIN DESCRIPTION - ORIENTATION: ORIENTATION: LOWER;LEFT

## 2025-03-31 NOTE — DISCHARGE INSTRUCTIONS
Thank You!    It was a pleasure taking care of you in our Emergency Department today. We know that when you come to our Emergency Department, you are entrusting us with your health, comfort, and safety. Our physicians and nurses honor that trust, and truly appreciate the opportunity to care for you and your loved ones.      We also value your feedback. If you receive a survey about your Emergency Department experience today, please fill it out.  We care about our patients' feedback, and we listen to what you have to say.  Thank you.    Wade Pineda MD  ________________________________________________________________________  I have included a copy of your lab results and/or radiologic studies from today's visit so you can have them easily available at your follow-up visit. We hope you feel better and please do not hesitate to contact the ED if you have any questions at all!    Recent Results (from the past 12 hours)   EKG 12 Lead    Collection Time: 03/31/25 12:22 AM   Result Value Ref Range    Ventricular Rate 85 BPM    Atrial Rate 85 BPM    P-R Interval 174 ms    QRS Duration 154 ms    Q-T Interval 452 ms    QTc Calculation (Bazett) 537 ms    P Axis 56 degrees    R Axis 265 degrees    T Axis 70 degrees    Diagnosis       Normal sinus rhythm  Right bundle branch block  Left ventricular hypertrophy with repolarization abnormality ( R in aVL )  Anteroseptal infarct (cited on or before 25-MAR-2025)  Abnormal ECG  When compared with ECG of 25-MAR-2025 23:37,  Criteria for Inferior infarct are no longer present  Serial changes of Anteroseptal infarct present     CBC with Auto Differential    Collection Time: 03/31/25  1:26 AM   Result Value Ref Range    WBC 8.9 4.1 - 11.1 K/uL    RBC 3.17 (L) 4.10 - 5.70 M/uL    Hemoglobin 9.7 (L) 12.1 - 17.0 g/dL    Hematocrit 31.1 (L) 36.6 - 50.3 %    MCV 98.1 80.0 - 99.0 FL    MCH 30.6 26.0 - 34.0 PG    MCHC 31.2 30.0 - 36.5 g/dL    RDW 15.9 (H) 11.5 - 14.5 %    Platelets 158

## 2025-03-31 NOTE — ED NOTES
Patient discharged from the ED by Miriam BALLARD. Diagnosis, medications, precautions and follow-ups were reviewed with the patient/family. Questions were asked and answered prior to departure. Patient departed the ED via wheelchair and was accompanied by self.

## 2025-04-03 LAB
EKG ATRIAL RATE: 85 BPM
EKG DIAGNOSIS: NORMAL
EKG P AXIS: 56 DEGREES
EKG P-R INTERVAL: 174 MS
EKG Q-T INTERVAL: 452 MS
EKG QRS DURATION: 154 MS
EKG QTC CALCULATION (BAZETT): 537 MS
EKG R AXIS: 265 DEGREES
EKG T AXIS: 70 DEGREES
EKG VENTRICULAR RATE: 85 BPM

## 2025-04-03 NOTE — ED PROVIDER NOTES
[facility] EMERGENCY DEPARTMENT  EMERGENCY DEPARTMENT ENCOUNTER    Patient Name: Claude E Gary  MRN: [id number]  YOB: 1975  Provider: Wade Pineda MD  PCP: Tramaine Rodas MD  Time/Date of evaluation:  3/31/25    History of Presenting Illness     Chief Complaint   Patient presents with    Abdominal Pain     Patient reports nausea and vomiting started around 9 pm with LLQ abdominal pain. Patient denies diarrhea.     Shortness of Breath     Patient had dialysis on Saturday, patient takes eliquis for Pe's and pulmonary HTN, patient reports feeling more SOB       HISTORY (Narrative):   Claude E Gary is a 49 y.o. male presents to the Emergency Department with acute onset of nausea, vomiting, abdominal pain, and shortness of breath that woke them from sleep around 9 o'clock tonight. The patient reports diffuse abdominal pain, more pronounced in the left lower quadrant, described as present \"everywhere I press on the belly\" but worse on the left lower side. Associated symptoms include nausea, vomiting, and shortness of breath, all of which began simultaneously upon waking. The patient confirms having experienced similar stomach pain issues in the past, with a recent ED visit for related complaints, has had negative CTs within the last 24 to 48 hours as well as normal lab work.    Current and Past Medications and Supplements  - Eliquis (no missed doses)  - History of pulmonary hypertension, managed by Dr. Gutierrez  - History of necrotic bowels in 2012    Social History  - Living situation: Not specified  - Substance use: Not specified    Review of Systems  - General: Positive for nausea  - Respiratory: Positive for shortness of breath  - Gastrointestinal: Positive for vomiting, abdominal pain (left lower side and diffuse)    Medical History  - Pulmonary hypertension, currently followed by Dr. Gutierrez  - Recent emergency room visit for stomach pain  - Necrotic bowel in 2012    Surgical History  -

## 2025-04-07 LAB
EKG ATRIAL RATE: 81 BPM
EKG DIAGNOSIS: NORMAL
EKG P AXIS: 0 DEGREES
EKG P-R INTERVAL: 178 MS
EKG Q-T INTERVAL: 476 MS
EKG QRS DURATION: 162 MS
EKG QTC CALCULATION (BAZETT): 552 MS
EKG R AXIS: -89 DEGREES
EKG T AXIS: 69 DEGREES
EKG VENTRICULAR RATE: 81 BPM

## 2025-04-17 ENCOUNTER — HOSPITAL ENCOUNTER (EMERGENCY)
Facility: HOSPITAL | Age: 50
Discharge: HOME | End: 2025-04-17
Attending: EMERGENCY MEDICINE
Payer: MEDICARE

## 2025-04-17 ENCOUNTER — APPOINTMENT (OUTPATIENT)
Facility: HOSPITAL | Age: 50
End: 2025-04-17
Payer: MEDICARE

## 2025-04-17 VITALS
TEMPERATURE: 98.8 F | RESPIRATION RATE: 28 BRPM | SYSTOLIC BLOOD PRESSURE: 116 MMHG | HEIGHT: 67 IN | OXYGEN SATURATION: 92 % | BODY MASS INDEX: 25.57 KG/M2 | DIASTOLIC BLOOD PRESSURE: 75 MMHG | HEART RATE: 78 BPM | WEIGHT: 162.92 LBS

## 2025-04-17 DIAGNOSIS — R11.2 NAUSEA AND VOMITING, UNSPECIFIED VOMITING TYPE: ICD-10-CM

## 2025-04-17 DIAGNOSIS — N18.6 ESRD ON HEMODIALYSIS (HCC): ICD-10-CM

## 2025-04-17 DIAGNOSIS — R94.31 PROLONGED Q-T INTERVAL ON ECG: ICD-10-CM

## 2025-04-17 DIAGNOSIS — G89.29 CHRONIC ABDOMINAL PAIN: ICD-10-CM

## 2025-04-17 DIAGNOSIS — R10.32 ABDOMINAL PAIN, LEFT LOWER QUADRANT: Primary | ICD-10-CM

## 2025-04-17 DIAGNOSIS — R10.9 CHRONIC ABDOMINAL PAIN: ICD-10-CM

## 2025-04-17 DIAGNOSIS — Z99.2 ESRD ON HEMODIALYSIS (HCC): ICD-10-CM

## 2025-04-17 LAB
ALBUMIN SERPL-MCNC: 3.3 G/DL (ref 3.5–5)
ALBUMIN/GLOB SERPL: 0.7 (ref 1.1–2.2)
ALP SERPL-CCNC: 438 U/L (ref 45–117)
ALT SERPL-CCNC: 11 U/L (ref 12–78)
ANION GAP SERPL CALC-SCNC: 6 MMOL/L (ref 2–12)
AST SERPL-CCNC: 25 U/L (ref 15–37)
BASOPHILS # BLD: 0.07 K/UL (ref 0–0.1)
BASOPHILS NFR BLD: 0.9 % (ref 0–1)
BILIRUB SERPL-MCNC: 0.5 MG/DL (ref 0.2–1)
BUN SERPL-MCNC: 47 MG/DL (ref 6–20)
BUN/CREAT SERPL: 4 (ref 12–20)
CALCIUM SERPL-MCNC: 9.2 MG/DL (ref 8.5–10.1)
CHLORIDE SERPL-SCNC: 100 MMOL/L (ref 97–108)
CO2 SERPL-SCNC: 28 MMOL/L (ref 21–32)
CREAT SERPL-MCNC: 10.7 MG/DL (ref 0.7–1.3)
DIFFERENTIAL METHOD BLD: ABNORMAL
EKG ATRIAL RATE: 81 BPM
EKG DIAGNOSIS: NORMAL
EKG P AXIS: 61 DEGREES
EKG P-R INTERVAL: 182 MS
EKG Q-T INTERVAL: 468 MS
EKG QRS DURATION: 164 MS
EKG QTC CALCULATION (BAZETT): 543 MS
EKG R AXIS: 253 DEGREES
EKG T AXIS: 68 DEGREES
EKG VENTRICULAR RATE: 81 BPM
EOSINOPHIL # BLD: 0.46 K/UL (ref 0–0.4)
EOSINOPHIL NFR BLD: 5.7 % (ref 0–7)
ERYTHROCYTE [DISTWIDTH] IN BLOOD BY AUTOMATED COUNT: 15.6 % (ref 11.5–14.5)
GLOBULIN SER CALC-MCNC: 5 G/DL (ref 2–4)
GLUCOSE SERPL-MCNC: 69 MG/DL (ref 65–100)
HCT VFR BLD AUTO: 30.9 % (ref 36.6–50.3)
HGB BLD-MCNC: 10 G/DL (ref 12.1–17)
IMM GRANULOCYTES # BLD AUTO: 0.02 K/UL (ref 0–0.04)
IMM GRANULOCYTES NFR BLD AUTO: 0.2 % (ref 0–0.5)
LACTATE BLD-SCNC: 1.49 MMOL/L (ref 0.4–2)
LIPASE SERPL-CCNC: 52 U/L (ref 13–75)
LYMPHOCYTES # BLD: 1.73 K/UL (ref 0.8–3.5)
LYMPHOCYTES NFR BLD: 21.4 % (ref 12–49)
MCH RBC QN AUTO: 31.4 PG (ref 26–34)
MCHC RBC AUTO-ENTMCNC: 32.4 G/DL (ref 30–36.5)
MCV RBC AUTO: 97.2 FL (ref 80–99)
MONOCYTES # BLD: 0.89 K/UL (ref 0–1)
MONOCYTES NFR BLD: 11 % (ref 5–13)
NEUTS SEG # BLD: 4.93 K/UL (ref 1.8–8)
NEUTS SEG NFR BLD: 60.8 % (ref 32–75)
NRBC # BLD: 0 K/UL (ref 0–0.01)
NRBC BLD-RTO: 0 PER 100 WBC
PLATELET # BLD AUTO: 214 K/UL (ref 150–400)
PMV BLD AUTO: 11.3 FL (ref 8.9–12.9)
POTASSIUM SERPL-SCNC: 4.5 MMOL/L (ref 3.5–5.1)
POTASSIUM SERPL-SCNC: 5.1 MMOL/L (ref 3.5–5.1)
PROT SERPL-MCNC: 8.3 G/DL (ref 6.4–8.2)
RBC # BLD AUTO: 3.18 M/UL (ref 4.1–5.7)
SODIUM SERPL-SCNC: 134 MMOL/L (ref 136–145)
WBC # BLD AUTO: 8.1 K/UL (ref 4.1–11.1)

## 2025-04-17 PROCEDURE — 2500000003 HC RX 250 WO HCPCS: Performed by: EMERGENCY MEDICINE

## 2025-04-17 PROCEDURE — 83605 ASSAY OF LACTIC ACID: CPT

## 2025-04-17 PROCEDURE — 93005 ELECTROCARDIOGRAM TRACING: CPT | Performed by: EMERGENCY MEDICINE

## 2025-04-17 PROCEDURE — 84132 ASSAY OF SERUM POTASSIUM: CPT

## 2025-04-17 PROCEDURE — 96374 THER/PROPH/DIAG INJ IV PUSH: CPT

## 2025-04-17 PROCEDURE — 80053 COMPREHEN METABOLIC PANEL: CPT

## 2025-04-17 PROCEDURE — 74176 CT ABD & PELVIS W/O CONTRAST: CPT

## 2025-04-17 PROCEDURE — 6370000000 HC RX 637 (ALT 250 FOR IP): Performed by: EMERGENCY MEDICINE

## 2025-04-17 PROCEDURE — 36415 COLL VENOUS BLD VENIPUNCTURE: CPT

## 2025-04-17 PROCEDURE — 85025 COMPLETE CBC W/AUTO DIFF WBC: CPT

## 2025-04-17 PROCEDURE — 83690 ASSAY OF LIPASE: CPT

## 2025-04-17 PROCEDURE — 99284 EMERGENCY DEPT VISIT MOD MDM: CPT

## 2025-04-17 RX ORDER — HYDROXYZINE HYDROCHLORIDE 25 MG/1
25 TABLET, FILM COATED ORAL
Status: COMPLETED | OUTPATIENT
Start: 2025-04-17 | End: 2025-04-17

## 2025-04-17 RX ORDER — HYDROMORPHONE HYDROCHLORIDE 1 MG/ML
0.5 INJECTION, SOLUTION INTRAMUSCULAR; INTRAVENOUS; SUBCUTANEOUS
Status: COMPLETED | OUTPATIENT
Start: 2025-04-17 | End: 2025-04-17

## 2025-04-17 RX ORDER — DICYCLOMINE HCL 20 MG
20 TABLET ORAL
Status: COMPLETED | OUTPATIENT
Start: 2025-04-17 | End: 2025-04-17

## 2025-04-17 RX ORDER — HYDROMORPHONE HYDROCHLORIDE 1 MG/ML
1 INJECTION, SOLUTION INTRAMUSCULAR; INTRAVENOUS; SUBCUTANEOUS
Status: COMPLETED | OUTPATIENT
Start: 2025-04-17 | End: 2025-04-17

## 2025-04-17 RX ADMIN — DICYCLOMINE HYDROCHLORIDE 20 MG: 20 TABLET ORAL at 23:02

## 2025-04-17 RX ADMIN — HYDROMORPHONE HYDROCHLORIDE 1 MG: 1 INJECTION, SOLUTION INTRAMUSCULAR; INTRAVENOUS; SUBCUTANEOUS at 21:05

## 2025-04-17 RX ADMIN — HYDROMORPHONE HYDROCHLORIDE 0.5 MG: 1 INJECTION, SOLUTION INTRAMUSCULAR; INTRAVENOUS; SUBCUTANEOUS at 23:01

## 2025-04-17 RX ADMIN — HYDROXYZINE HYDROCHLORIDE 25 MG: 25 TABLET, FILM COATED ORAL at 21:05

## 2025-04-17 ASSESSMENT — PAIN - FUNCTIONAL ASSESSMENT
PAIN_FUNCTIONAL_ASSESSMENT: 0-10
PAIN_FUNCTIONAL_ASSESSMENT: ACTIVITIES ARE NOT PREVENTED

## 2025-04-17 ASSESSMENT — PAIN DESCRIPTION - DESCRIPTORS
DESCRIPTORS: DULL
DESCRIPTORS: ACHING;DULL
DESCRIPTORS: DULL
DESCRIPTORS: DULL

## 2025-04-17 ASSESSMENT — PAIN DESCRIPTION - LOCATION
LOCATION: ABDOMEN

## 2025-04-17 ASSESSMENT — PAIN SCALES - GENERAL
PAINLEVEL_OUTOF10: 7

## 2025-04-17 ASSESSMENT — PAIN DESCRIPTION - ORIENTATION
ORIENTATION: LEFT;LOWER

## 2025-04-17 ASSESSMENT — PAIN DESCRIPTION - PAIN TYPE: TYPE: ACUTE PAIN

## 2025-04-18 NOTE — DISCHARGE INSTRUCTIONS
You were evaluated in the emergency department for abdominal pain.  Your examination was reassuring as was your work-up including lab work and CT scan.  It will be important for you to follow-up with your primary care physician and GI doctor in 3-7 days.  If you develop worsening symptoms such as worsening pain, vomiting, or fevers, please return to the emergency department immediately.

## 2025-04-18 NOTE — ED NOTES
This RN has reviewed discharge instructions with the patient at this time. The Patient verbalized understanding and denies any further questions. The patient has been made aware of prescription(s) called into pharmacy for . Patient wheeled out to waiting room at this time.

## 2025-04-18 NOTE — ED PROVIDER NOTES
[facility] EMERGENCY DEPARTMENT  EMERGENCY DEPARTMENT ENCOUNTER       Pt Name: Claude E Gary  MRN: [id number]  Birthdate 1975  Date of evaluation: 4/17/2025  Provider: Waqas Goncalves MD   PCP: Tramaine Rodas MD  Note Started: 2:24 AM 4/17/25     CHIEF COMPLAINT       Chief Complaint   Patient presents with   • Abdominal Pain     Pt arrives ambulatory into ED 36 with chief complaint of nausea, vomiting, and LLQ abd pain since 1100 today. Pt denies diarrhea/constipation. Patient denies dark stools. Patient states he's vomited 3x today. Denies exposure to illness. Hx of appendectomy, bowel resection, gall bladder removal. Pt has a BTK amputation on the L leg.    • Nausea        HISTORY OF PRESENT ILLNESS: 1 or more elements      History From: Patient, History limited by: No limitations     Claude E Gary is a 49 y.o. male with history of ESRD on TTS HD, history of seizures, SBO, necrotic bowel, endocarditis, DVT, PUD, who presents with chief complaint of nausea, vomiting, abdominal pain.  Pain is located to the left lower quadrant of the abdomen.  States it is worse than typical, described as severe with radiation throughout entire abdomen.  Is associated with multiple episodes of nonbloody nonbilious emesis onset earlier today while at dialysis.  He had full dialysis session today.  Denies any fevers, chills, change in bowel habits.     Nursing Notes were all reviewed and agreed with or any disagreements were addressed in the HPI.     REVIEW OF SYSTEMS        Positives and Pertinent negatives as per HPI.    PAST HISTORY     Past Medical History:  Past Medical History:   Diagnosis Date   • AICD (automatic cardioverter/defibrillator) present    • Below knee amputation (HCC) 05/28/2023    left   • Bleeding ulcer    • CAD (coronary artery disease)     anterior MI s/p 2 stents  2007   • Constipation    • DVT (deep venous thrombosis) ([facility]) 2001    in LLE \"multiple times\"   • Endocarditis    • ESRD on dialysis

## 2025-07-02 ENCOUNTER — HOSPITAL ENCOUNTER (EMERGENCY)
Facility: HOSPITAL | Age: 50
Discharge: HOME | End: 2025-07-02
Attending: EMERGENCY MEDICINE
Payer: MEDICARE

## 2025-07-02 ENCOUNTER — APPOINTMENT (OUTPATIENT)
Facility: HOSPITAL | Age: 50
End: 2025-07-02
Payer: MEDICARE

## 2025-07-02 VITALS
RESPIRATION RATE: 26 BRPM | SYSTOLIC BLOOD PRESSURE: 139 MMHG | OXYGEN SATURATION: 95 % | WEIGHT: 120.37 LBS | HEART RATE: 80 BPM | HEIGHT: 67 IN | BODY MASS INDEX: 18.89 KG/M2 | TEMPERATURE: 98.8 F | DIASTOLIC BLOOD PRESSURE: 105 MMHG

## 2025-07-02 DIAGNOSIS — J90 PLEURAL EFFUSION ON RIGHT: Primary | ICD-10-CM

## 2025-07-02 DIAGNOSIS — Z99.2 ESRD (END STAGE RENAL DISEASE) ON DIALYSIS (HCC): ICD-10-CM

## 2025-07-02 DIAGNOSIS — N18.6 ESRD (END STAGE RENAL DISEASE) ON DIALYSIS (HCC): ICD-10-CM

## 2025-07-02 DIAGNOSIS — R06.09 DYSPNEA ON EXERTION: ICD-10-CM

## 2025-07-02 LAB
ALBUMIN SERPL-MCNC: 3.3 G/DL (ref 3.5–5)
ALBUMIN/GLOB SERPL: 0.8 (ref 1.1–2.2)
ALP SERPL-CCNC: 514 U/L (ref 45–117)
ALT SERPL-CCNC: 8 U/L (ref 12–78)
ANION GAP SERPL CALC-SCNC: 6 MMOL/L (ref 2–12)
AST SERPL-CCNC: 20 U/L (ref 15–37)
BASOPHILS # BLD: 0.07 K/UL (ref 0–0.1)
BASOPHILS NFR BLD: 1.1 % (ref 0–1)
BILIRUB SERPL-MCNC: 0.3 MG/DL (ref 0.2–1)
BUN SERPL-MCNC: 26 MG/DL (ref 6–20)
BUN/CREAT SERPL: 4 (ref 12–20)
CALCIUM SERPL-MCNC: 8.7 MG/DL (ref 8.5–10.1)
CHLORIDE SERPL-SCNC: 99 MMOL/L (ref 97–108)
CO2 SERPL-SCNC: 31 MMOL/L (ref 21–32)
CREAT SERPL-MCNC: 6.31 MG/DL (ref 0.7–1.3)
DIFFERENTIAL METHOD BLD: ABNORMAL
EKG ATRIAL RATE: 83 BPM
EKG DIAGNOSIS: NORMAL
EKG P AXIS: 76 DEGREES
EKG P-R INTERVAL: 190 MS
EKG Q-T INTERVAL: 494 MS
EKG QRS DURATION: 162 MS
EKG QTC CALCULATION (BAZETT): 580 MS
EKG R AXIS: -68 DEGREES
EKG T AXIS: 105 DEGREES
EKG VENTRICULAR RATE: 83 BPM
EOSINOPHIL # BLD: 0.27 K/UL (ref 0–0.4)
EOSINOPHIL NFR BLD: 4.3 % (ref 0–7)
ERYTHROCYTE [DISTWIDTH] IN BLOOD BY AUTOMATED COUNT: 16.5 % (ref 11.5–14.5)
GLOBULIN SER CALC-MCNC: 4.3 G/DL (ref 2–4)
GLUCOSE SERPL-MCNC: 73 MG/DL (ref 65–100)
HCT VFR BLD AUTO: 28.7 % (ref 36.6–50.3)
HGB BLD-MCNC: 9.1 G/DL (ref 12.1–17)
IMM GRANULOCYTES # BLD AUTO: 0.01 K/UL (ref 0–0.04)
IMM GRANULOCYTES NFR BLD AUTO: 0.2 % (ref 0–0.5)
LYMPHOCYTES # BLD: 1.17 K/UL (ref 0.8–3.5)
LYMPHOCYTES NFR BLD: 18.7 % (ref 12–49)
MAGNESIUM SERPL-MCNC: 1.9 MG/DL (ref 1.6–2.4)
MCH RBC QN AUTO: 30.7 PG (ref 26–34)
MCHC RBC AUTO-ENTMCNC: 31.7 G/DL (ref 30–36.5)
MCV RBC AUTO: 97 FL (ref 80–99)
MONOCYTES # BLD: 0.76 K/UL (ref 0–1)
MONOCYTES NFR BLD: 12.1 % (ref 5–13)
NEUTS SEG # BLD: 3.99 K/UL (ref 1.8–8)
NEUTS SEG NFR BLD: 63.6 % (ref 32–75)
NRBC # BLD: 0 K/UL (ref 0–0.01)
NRBC BLD-RTO: 0 PER 100 WBC
NT PRO BNP: ABNORMAL PG/ML
PLATELET # BLD AUTO: 185 K/UL (ref 150–400)
PMV BLD AUTO: 11 FL (ref 8.9–12.9)
POTASSIUM SERPL-SCNC: 3.4 MMOL/L (ref 3.5–5.1)
PROT SERPL-MCNC: 7.6 G/DL (ref 6.4–8.2)
RBC # BLD AUTO: 2.96 M/UL (ref 4.1–5.7)
SODIUM SERPL-SCNC: 136 MMOL/L (ref 136–145)
TROPONIN I SERPL HS-MCNC: 41 NG/L (ref 0–76)
WBC # BLD AUTO: 6.3 K/UL (ref 4.1–11.1)

## 2025-07-02 PROCEDURE — 85025 COMPLETE CBC W/AUTO DIFF WBC: CPT

## 2025-07-02 PROCEDURE — 84484 ASSAY OF TROPONIN QUANT: CPT

## 2025-07-02 PROCEDURE — 80053 COMPREHEN METABOLIC PANEL: CPT

## 2025-07-02 PROCEDURE — 93005 ELECTROCARDIOGRAM TRACING: CPT | Performed by: EMERGENCY MEDICINE

## 2025-07-02 PROCEDURE — 99285 EMERGENCY DEPT VISIT HI MDM: CPT

## 2025-07-02 PROCEDURE — 83880 ASSAY OF NATRIURETIC PEPTIDE: CPT

## 2025-07-02 PROCEDURE — 71045 X-RAY EXAM CHEST 1 VIEW: CPT

## 2025-07-02 PROCEDURE — 36415 COLL VENOUS BLD VENIPUNCTURE: CPT

## 2025-07-02 PROCEDURE — 83735 ASSAY OF MAGNESIUM: CPT

## 2025-07-02 ASSESSMENT — PAIN - FUNCTIONAL ASSESSMENT: PAIN_FUNCTIONAL_ASSESSMENT: NONE - DENIES PAIN

## 2025-07-02 NOTE — ED PROVIDER NOTES
[facility] EMERGENCY DEPARTMENT  EMERGENCY DEPARTMENT ENCOUNTER       Pt Name: Claude E Gary  MRN: [id number]  Birthdate 1975  Date of evaluation: 7/2/2025  Provider: Waqas Goncalves MD   PCP: Tramaine Rodas MD  Note Started: 9:59 PM 7/2/25     CHIEF COMPLAINT       Chief Complaint   Patient presents with    Shortness of Breath     Pt BIBEMS from home with a chief complaint of dyspnea with exertion. Per EMS, pt was at dialysis yesterday and they only took off 1.5L yesterday where he usually gets 2.5L taken off d/t his BP being 102/65. BP today is 128/94 per EMS. NSR per EMS. Hx internal defibrillator and CKD. Patient denies chest pain.         HISTORY OF PRESENT ILLNESS: 1 or more elements      History From: patient, History limited by: No limitations     Claude E Gary is a 49 y.o. male with end-stage renal disease requiring dialysis on TTS HD and history of pulmonary embolisms, presents to the Emergency Department with shortness of breath. The patient reports feeling short of breath with any activity, including swallowing. He experiences dyspnea when lying flat and when walking around.    The patient believes he has excess fluid retention, noting fluid in his right leg (L BKA). He had dialysis yesterday but was only able to remove 1.6 liters of fluid instead of the usual 2 liters due to low blood pressure. The patient denies any new cough, fever, nausea, vomiting, or chest pain. He takes Eliquis for his history of pulmonary embolisms and has not missed any doses.    The patient has a history of failed fistulas and grafts for dialysis access and currently has a dialysis catheter in his right femoral. He reports some pain associated with the edema in his right leg. The shortness of breath is significantly impacting his daily activities, as he feels breathless with any movement beyond sitting still.    Medical History  - End-stage renal disease requiring dialysis  - History of pulmonary embolisms  -

## 2025-07-03 NOTE — ED NOTES
Patient ambulatory with pulse ox per order. Patient walked from ED 29 to ED xray rooms and back maintaining a SpO2 of %. Patient had a steady gait however did exhibit and express shortness of breath. Sacha BALLARD notified of ambulation trial.

## 2025-07-03 NOTE — ED NOTES
This RN reviewed discharge paperwork with the patient. Patient verbalizes understanding and was provided and opportunity to ask questions.

## 2025-07-03 NOTE — DISCHARGE INSTRUCTIONS
You were evaluated in the emergency department for shortness of breath.  Your examination was reassuring as was your work-up including lab work, EKG, and chest xray which shows a stable right sided pleural effusion.  It will be important for you to follow-up with your primary care physician in 2-3 days and please keep your appointment with dialysis tomorrow.  If you develop worsening symptoms such as worsening shortness of breath, chest pain, or any fevers, please return to the emergency department immediately.

## 2025-07-08 ENCOUNTER — HOSPITAL ENCOUNTER (EMERGENCY)
Facility: HOSPITAL | Age: 50
Discharge: HOME | End: 2025-07-08
Attending: EMERGENCY MEDICINE
Payer: MEDICARE

## 2025-07-08 ENCOUNTER — APPOINTMENT (OUTPATIENT)
Facility: HOSPITAL | Age: 50
End: 2025-07-08
Payer: MEDICARE

## 2025-07-08 VITALS
RESPIRATION RATE: 24 BRPM | SYSTOLIC BLOOD PRESSURE: 126 MMHG | TEMPERATURE: 98.1 F | OXYGEN SATURATION: 96 % | HEART RATE: 88 BPM | DIASTOLIC BLOOD PRESSURE: 70 MMHG

## 2025-07-08 DIAGNOSIS — R10.9 ACUTE ABDOMINAL PAIN: Primary | ICD-10-CM

## 2025-07-08 DIAGNOSIS — R11.2 NAUSEA AND VOMITING, UNSPECIFIED VOMITING TYPE: ICD-10-CM

## 2025-07-08 LAB
ALBUMIN SERPL-MCNC: 3.3 G/DL (ref 3.5–5)
ALBUMIN/GLOB SERPL: 0.9 (ref 1.1–2.2)
ALP SERPL-CCNC: 635 U/L (ref 45–117)
ALT SERPL-CCNC: 8 U/L (ref 12–78)
ANION GAP SERPL CALC-SCNC: 8 MMOL/L (ref 2–12)
AST SERPL-CCNC: 16 U/L (ref 15–37)
BASOPHILS # BLD: 0.12 K/UL (ref 0–0.1)
BASOPHILS NFR BLD: 1.6 % (ref 0–1)
BILIRUB SERPL-MCNC: 0.4 MG/DL (ref 0.2–1)
BUN SERPL-MCNC: 21 MG/DL (ref 6–20)
BUN/CREAT SERPL: 4 (ref 12–20)
CALCIUM SERPL-MCNC: 8 MG/DL (ref 8.5–10.1)
CHLORIDE SERPL-SCNC: 97 MMOL/L (ref 97–108)
CO2 SERPL-SCNC: 30 MMOL/L (ref 21–32)
CREAT SERPL-MCNC: 5.54 MG/DL (ref 0.7–1.3)
DIFFERENTIAL METHOD BLD: ABNORMAL
EOSINOPHIL # BLD: 0.37 K/UL (ref 0–0.4)
EOSINOPHIL NFR BLD: 4.9 % (ref 0–7)
ERYTHROCYTE [DISTWIDTH] IN BLOOD BY AUTOMATED COUNT: 16.8 % (ref 11.5–14.5)
GLOBULIN SER CALC-MCNC: 3.8 G/DL (ref 2–4)
GLUCOSE SERPL-MCNC: 80 MG/DL (ref 65–100)
HCT VFR BLD AUTO: 28.9 % (ref 36.6–50.3)
HGB BLD-MCNC: 9.1 G/DL (ref 12.1–17)
IMM GRANULOCYTES # BLD AUTO: 0.01 K/UL (ref 0–0.04)
IMM GRANULOCYTES NFR BLD AUTO: 0.1 % (ref 0–0.5)
LIPASE SERPL-CCNC: 58 U/L (ref 13–75)
LYMPHOCYTES # BLD: 1.04 K/UL (ref 0.8–3.5)
LYMPHOCYTES NFR BLD: 13.9 % (ref 12–49)
MCH RBC QN AUTO: 30.5 PG (ref 26–34)
MCHC RBC AUTO-ENTMCNC: 31.5 G/DL (ref 30–36.5)
MCV RBC AUTO: 97 FL (ref 80–99)
MONOCYTES # BLD: 0.97 K/UL (ref 0–1)
MONOCYTES NFR BLD: 12.9 % (ref 5–13)
NEUTS SEG # BLD: 4.99 K/UL (ref 1.8–8)
NEUTS SEG NFR BLD: 66.6 % (ref 32–75)
NRBC # BLD: 0 K/UL (ref 0–0.01)
NRBC BLD-RTO: 0 PER 100 WBC
PLATELET # BLD AUTO: 157 K/UL (ref 150–400)
PMV BLD AUTO: 10.9 FL (ref 8.9–12.9)
POTASSIUM SERPL-SCNC: 3.6 MMOL/L (ref 3.5–5.1)
PROT SERPL-MCNC: 7.1 G/DL (ref 6.4–8.2)
RBC # BLD AUTO: 2.98 M/UL (ref 4.1–5.7)
RBC MORPH BLD: ABNORMAL
SODIUM SERPL-SCNC: 135 MMOL/L (ref 136–145)
WBC # BLD AUTO: 7.5 K/UL (ref 4.1–11.1)

## 2025-07-08 PROCEDURE — 74176 CT ABD & PELVIS W/O CONTRAST: CPT

## 2025-07-08 PROCEDURE — 80053 COMPREHEN METABOLIC PANEL: CPT

## 2025-07-08 PROCEDURE — 6360000002 HC RX W HCPCS: Performed by: EMERGENCY MEDICINE

## 2025-07-08 PROCEDURE — 83690 ASSAY OF LIPASE: CPT

## 2025-07-08 PROCEDURE — 85025 COMPLETE CBC W/AUTO DIFF WBC: CPT

## 2025-07-08 PROCEDURE — 36415 COLL VENOUS BLD VENIPUNCTURE: CPT

## 2025-07-08 PROCEDURE — 96375 TX/PRO/DX INJ NEW DRUG ADDON: CPT

## 2025-07-08 PROCEDURE — 96374 THER/PROPH/DIAG INJ IV PUSH: CPT

## 2025-07-08 PROCEDURE — 99284 EMERGENCY DEPT VISIT MOD MDM: CPT

## 2025-07-08 RX ORDER — 0.9 % SODIUM CHLORIDE 0.9 %
250 INTRAVENOUS SOLUTION INTRAVENOUS ONCE
Status: DISCONTINUED | OUTPATIENT
Start: 2025-07-08 | End: 2025-07-09 | Stop reason: HOSPADM

## 2025-07-08 RX ORDER — OXYCODONE HYDROCHLORIDE 5 MG/1
5 TABLET ORAL EVERY 6 HOURS PRN
Qty: 12 TABLET | Refills: 0 | Status: SHIPPED | OUTPATIENT
Start: 2025-07-08 | End: 2025-07-11

## 2025-07-08 RX ORDER — ONDANSETRON 2 MG/ML
4 INJECTION INTRAMUSCULAR; INTRAVENOUS ONCE
Status: COMPLETED | OUTPATIENT
Start: 2025-07-08 | End: 2025-07-08

## 2025-07-08 RX ORDER — HYDROMORPHONE HYDROCHLORIDE 1 MG/ML
0.25 INJECTION, SOLUTION INTRAMUSCULAR; INTRAVENOUS; SUBCUTANEOUS
Status: COMPLETED | OUTPATIENT
Start: 2025-07-08 | End: 2025-07-08

## 2025-07-08 RX ORDER — ONDANSETRON 4 MG/1
4 TABLET, ORALLY DISINTEGRATING ORAL 3 TIMES DAILY PRN
Qty: 15 TABLET | Refills: 0 | Status: SHIPPED | OUTPATIENT
Start: 2025-07-08

## 2025-07-08 RX ADMIN — ONDANSETRON 4 MG: 2 INJECTION, SOLUTION INTRAMUSCULAR; INTRAVENOUS at 21:21

## 2025-07-08 RX ADMIN — HYDROMORPHONE HYDROCHLORIDE 0.25 MG: 1 INJECTION, SOLUTION INTRAMUSCULAR; INTRAVENOUS; SUBCUTANEOUS at 21:40

## 2025-07-08 ASSESSMENT — PAIN SCALES - GENERAL
PAINLEVEL_OUTOF10: 2
PAINLEVEL_OUTOF10: 6
PAINLEVEL_OUTOF10: 7

## 2025-07-08 ASSESSMENT — PAIN - FUNCTIONAL ASSESSMENT: PAIN_FUNCTIONAL_ASSESSMENT: 0-10

## 2025-07-08 ASSESSMENT — PAIN DESCRIPTION - DESCRIPTORS
DESCRIPTORS: ACHING;DULL
DESCRIPTORS: ACHING

## 2025-07-08 ASSESSMENT — PAIN DESCRIPTION - ORIENTATION
ORIENTATION: MID
ORIENTATION: MID

## 2025-07-08 ASSESSMENT — PAIN DESCRIPTION - LOCATION
LOCATION: ABDOMEN
LOCATION: ABDOMEN

## 2025-07-09 NOTE — ED PROVIDER NOTES
[facility] EMERGENCY DEPARTMENT  EMERGENCY DEPARTMENT ENCOUNTER       Pt Name: Claude E Gary  MRN: [id number]  Birthdate 1975  Date of evaluation: 7/8/2025  Provider: Geraldo Martinez MD   PCP: Tramaine Rodas MD  Note Started: 9:12 PM EDT 7/8/25     CHIEF COMPLAINT       Chief Complaint   Patient presents with    Abdominal Pain     Pt arrives w cc of generalized abd pain, nausea, vomiting x 3 hours. Denies diarrhea, chest pain, SOB        HISTORY OF PRESENT ILLNESS: 1 or more elements      History From: patient, History limited by: none     Claude E Gary is a 49 y.o. male patient with history of end-stage renal disease on dialysis, coronary artery disease, DVT, IVC filter, AICD, peripheral vascular disease, here for abdominal pain and vomiting.  The patient went to dialysis earlier today.  He believes they extracted too much fluid.  He has developed diffuse abdominal pain, nausea and vomiting.  The pain is 9 out of 10 in severity.  He denies chest pain, shortness of breath, fever, change in bowel habits.  He does not make urine.       Please See MDM for Additional Details of the HPI/PMH  Nursing Notes were all reviewed and agreed with or any disagreements were addressed in the HPI.     REVIEW OF SYSTEMS        Positives and Pertinent negatives as per HPI.    PAST HISTORY     Past Medical History:  Past Medical History:   Diagnosis Date    AICD (automatic cardioverter/defibrillator) present     Below knee amputation (HCC) 05/28/2023    left    Bleeding ulcer     CAD (coronary artery disease)     anterior MI s/p 2 stents  2007    Constipation     DVT (deep venous thrombosis) ([facility]) 2001    in LLE \"multiple times\"    Endocarditis     ESRD on dialysis ([facility])     Home [facility] home x5 days week M-F does labs    Gallstone pancreatitis     GERD (gastroesophageal reflux disease)     High cholesterol     History of blood transfusion     HIT (heparin-induced thrombocytopenia)     Hypertension

## 2025-07-16 PROCEDURE — 99284 EMERGENCY DEPT VISIT MOD MDM: CPT

## 2025-07-16 RX ORDER — ONDANSETRON 2 MG/ML
4 INJECTION INTRAMUSCULAR; INTRAVENOUS ONCE
Status: COMPLETED | OUTPATIENT
Start: 2025-07-16 | End: 2025-07-17

## 2025-07-16 ASSESSMENT — PAIN DESCRIPTION - FREQUENCY: FREQUENCY: INTERMITTENT

## 2025-07-16 ASSESSMENT — PAIN DESCRIPTION - LOCATION: LOCATION: ABDOMEN

## 2025-07-16 ASSESSMENT — PAIN DESCRIPTION - PAIN TYPE: TYPE: ACUTE PAIN

## 2025-07-16 ASSESSMENT — PAIN DESCRIPTION - DESCRIPTORS: DESCRIPTORS: STABBING

## 2025-07-16 ASSESSMENT — PAIN - FUNCTIONAL ASSESSMENT: PAIN_FUNCTIONAL_ASSESSMENT: 0-10

## 2025-07-16 ASSESSMENT — PAIN SCALES - GENERAL: PAINLEVEL_OUTOF10: 7

## 2025-07-17 ENCOUNTER — HOSPITAL ENCOUNTER (EMERGENCY)
Facility: HOSPITAL | Age: 50
Discharge: HOME | End: 2025-07-17
Attending: STUDENT IN AN ORGANIZED HEALTH CARE EDUCATION/TRAINING PROGRAM
Payer: MEDICARE

## 2025-07-17 VITALS
HEIGHT: 67 IN | TEMPERATURE: 98.2 F | HEART RATE: 90 BPM | WEIGHT: 123.68 LBS | BODY MASS INDEX: 19.41 KG/M2 | SYSTOLIC BLOOD PRESSURE: 124 MMHG | OXYGEN SATURATION: 90 % | RESPIRATION RATE: 28 BRPM | DIASTOLIC BLOOD PRESSURE: 84 MMHG

## 2025-07-17 DIAGNOSIS — R10.9 CHRONIC ABDOMINAL PAIN: Primary | ICD-10-CM

## 2025-07-17 DIAGNOSIS — G89.29 CHRONIC ABDOMINAL PAIN: Primary | ICD-10-CM

## 2025-07-17 LAB
ALBUMIN SERPL-MCNC: 3.3 G/DL (ref 3.5–5)
ALBUMIN/GLOB SERPL: 0.8 (ref 1.1–2.2)
ALP SERPL-CCNC: 621 U/L (ref 45–117)
ALT SERPL-CCNC: 10 U/L (ref 12–78)
ANION GAP SERPL CALC-SCNC: 5 MMOL/L (ref 2–12)
AST SERPL-CCNC: 15 U/L (ref 15–37)
BASOPHILS # BLD: 0.07 K/UL (ref 0–0.1)
BASOPHILS NFR BLD: 1 % (ref 0–1)
BILIRUB SERPL-MCNC: 0.4 MG/DL (ref 0.2–1)
BUN SERPL-MCNC: 16 MG/DL (ref 6–20)
BUN/CREAT SERPL: 4 (ref 12–20)
CALCIUM SERPL-MCNC: 7.6 MG/DL (ref 8.5–10.1)
CHLORIDE SERPL-SCNC: 99 MMOL/L (ref 97–108)
CO2 SERPL-SCNC: 29 MMOL/L (ref 21–32)
CREAT SERPL-MCNC: 4.49 MG/DL (ref 0.7–1.3)
DIFFERENTIAL METHOD BLD: ABNORMAL
EOSINOPHIL # BLD: 0.29 K/UL (ref 0–0.4)
EOSINOPHIL NFR BLD: 4.3 % (ref 0–7)
ERYTHROCYTE [DISTWIDTH] IN BLOOD BY AUTOMATED COUNT: 16 % (ref 11.5–14.5)
GLOBULIN SER CALC-MCNC: 4.2 G/DL (ref 2–4)
GLUCOSE SERPL-MCNC: 78 MG/DL (ref 65–100)
HCT VFR BLD AUTO: 28.9 % (ref 36.6–50.3)
HGB BLD-MCNC: 9.1 G/DL (ref 12.1–17)
IMM GRANULOCYTES # BLD AUTO: 0.02 K/UL (ref 0–0.04)
IMM GRANULOCYTES NFR BLD AUTO: 0.3 % (ref 0–0.5)
LYMPHOCYTES # BLD: 0.94 K/UL (ref 0.8–3.5)
LYMPHOCYTES NFR BLD: 14 % (ref 12–49)
MCH RBC QN AUTO: 31 PG (ref 26–34)
MCHC RBC AUTO-ENTMCNC: 31.5 G/DL (ref 30–36.5)
MCV RBC AUTO: 98.3 FL (ref 80–99)
MONOCYTES # BLD: 0.79 K/UL (ref 0–1)
MONOCYTES NFR BLD: 11.8 % (ref 5–13)
NEUTS SEG # BLD: 4.6 K/UL (ref 1.8–8)
NEUTS SEG NFR BLD: 68.6 % (ref 32–75)
NRBC # BLD: 0 K/UL (ref 0–0.01)
NRBC BLD-RTO: 0 PER 100 WBC
PLATELET # BLD AUTO: 142 K/UL (ref 150–400)
PMV BLD AUTO: 10.7 FL (ref 8.9–12.9)
POTASSIUM SERPL-SCNC: 3.7 MMOL/L (ref 3.5–5.1)
PROT SERPL-MCNC: 7.5 G/DL (ref 6.4–8.2)
RBC # BLD AUTO: 2.94 M/UL (ref 4.1–5.7)
SODIUM SERPL-SCNC: 133 MMOL/L (ref 136–145)
WBC # BLD AUTO: 6.7 K/UL (ref 4.1–11.1)

## 2025-07-17 PROCEDURE — 80053 COMPREHEN METABOLIC PANEL: CPT

## 2025-07-17 PROCEDURE — 96374 THER/PROPH/DIAG INJ IV PUSH: CPT

## 2025-07-17 PROCEDURE — 6370000000 HC RX 637 (ALT 250 FOR IP)

## 2025-07-17 PROCEDURE — 6370000000 HC RX 637 (ALT 250 FOR IP): Performed by: STUDENT IN AN ORGANIZED HEALTH CARE EDUCATION/TRAINING PROGRAM

## 2025-07-17 PROCEDURE — 6360000002 HC RX W HCPCS: Performed by: EMERGENCY MEDICINE

## 2025-07-17 PROCEDURE — 85025 COMPLETE CBC W/AUTO DIFF WBC: CPT

## 2025-07-17 PROCEDURE — 36415 COLL VENOUS BLD VENIPUNCTURE: CPT

## 2025-07-17 RX ORDER — ACETAMINOPHEN 500 MG
1000 TABLET ORAL ONCE
Status: COMPLETED | OUTPATIENT
Start: 2025-07-17 | End: 2025-07-17

## 2025-07-17 RX ORDER — 0.9 % SODIUM CHLORIDE 0.9 %
250 INTRAVENOUS SOLUTION INTRAVENOUS ONCE
Status: DISCONTINUED | OUTPATIENT
Start: 2025-07-17 | End: 2025-07-17

## 2025-07-17 RX ORDER — PROCHLORPERAZINE MALEATE 5 MG/1
5 TABLET ORAL ONCE AS NEEDED
Status: DISCONTINUED | OUTPATIENT
Start: 2025-07-17 | End: 2025-07-17 | Stop reason: HOSPADM

## 2025-07-17 RX ORDER — DIPHENHYDRAMINE HCL 25 MG
25 CAPSULE ORAL
Status: COMPLETED | OUTPATIENT
Start: 2025-07-17 | End: 2025-07-17

## 2025-07-17 RX ADMIN — ACETAMINOPHEN 1000 MG: 500 TABLET ORAL at 00:51

## 2025-07-17 RX ADMIN — ONDANSETRON 4 MG: 2 INJECTION, SOLUTION INTRAMUSCULAR; INTRAVENOUS at 00:52

## 2025-07-17 RX ADMIN — DIPHENHYDRAMINE HYDROCHLORIDE 25 MG: 25 CAPSULE ORAL at 01:45

## 2025-07-17 NOTE — ED PROVIDER NOTES
[facility] EMERGENCY DEPARTMENT  EMERGENCY DEPARTMENT ENCOUNTER       Pt Name: Claude E Gary  MRN: [id number]  Birthdate 1975  Date of evaluation: 7/16/2025  Provider: Tavares Em DO   PCP: Tramaine Rodas MD  Note Started: 12:42 AM EDT 7/17/25  Attending Physician: Dr. Benjamin    CHIEF COMPLAINT       Chief Complaint   Patient presents with    Abdominal Pain     Patient reports generalized abdominal pain that began earlier today    Vomiting     Patient reports 3 episodes of vomiting today        HISTORY OF PRESENT ILLNESS: 1 or more elements      History From: patient, History limited by: none     Claude E Gary is a 49 y.o. male with history of left BKA, end-stage renal disease on hemodialysis (Tuesdays, Thursdays, Saturday), hypertension who presents to the ED with vomiting and abdominal pain.  Patient reports that abdominal pain has been going on for 1 to 2 years and is intermittent in nature where he will have occasional vomiting episodes.  He vomited 3 times today.  He denies missing dialysis.  He says the pain is the same as previously and denies any worsening or changes in quality of pain.  He denies any shortness of breath, chest pain, rash, diarrhea, history of kidney stones, fever/chills.  Of note he does not make urine       Please See MDM for Additional Details of the HPI/PMH  Nursing Notes were all reviewed and agreed with or any disagreements were addressed in the HPI.     REVIEW OF SYSTEMS        Positives and Pertinent negatives as per HPI.    PAST HISTORY     Past Medical History:  Past Medical History:   Diagnosis Date    AICD (automatic cardioverter/defibrillator) present     Below knee amputation ([facility]) 05/28/2023    left    Bleeding ulcer     CAD (coronary artery disease)     anterior MI s/p 2 stents  2007    Constipation     DVT (deep venous thrombosis) ([facility]) 2001    in LLE \"multiple times\"    Endocarditis     ESRD on dialysis ([facility])     Home [facility] home x5 days

## 2025-07-17 NOTE — ED TRIAGE NOTES
Patient ambulatory to triage from waiting room with complaint of abdomial pain and vomiting. Patient reports onset of symptoms earlier today. Patient reports he is a dialysis patient T/Th/Sat denies missing and days.

## 2025-08-12 ENCOUNTER — APPOINTMENT (OUTPATIENT)
Facility: HOSPITAL | Age: 50
End: 2025-08-12
Payer: MEDICARE

## 2025-08-12 ENCOUNTER — HOSPITAL ENCOUNTER (EMERGENCY)
Facility: HOSPITAL | Age: 50
Discharge: HOME | End: 2025-08-12
Attending: EMERGENCY MEDICINE
Payer: MEDICARE

## 2025-08-12 VITALS
RESPIRATION RATE: 17 BRPM | BODY MASS INDEX: 19.37 KG/M2 | WEIGHT: 123.68 LBS | TEMPERATURE: 98.1 F | HEART RATE: 97 BPM | OXYGEN SATURATION: 97 % | DIASTOLIC BLOOD PRESSURE: 93 MMHG | SYSTOLIC BLOOD PRESSURE: 135 MMHG

## 2025-08-12 DIAGNOSIS — J90 LOCULATED PLEURAL EFFUSION: ICD-10-CM

## 2025-08-12 DIAGNOSIS — R10.9 CHRONIC ABDOMINAL PAIN: Primary | ICD-10-CM

## 2025-08-12 DIAGNOSIS — G89.29 CHRONIC ABDOMINAL PAIN: Primary | ICD-10-CM

## 2025-08-12 DIAGNOSIS — R11.0 NAUSEA: ICD-10-CM

## 2025-08-12 LAB
ALBUMIN SERPL-MCNC: 3.5 G/DL (ref 3.5–5.2)
ALBUMIN/GLOB SERPL: 0.8 (ref 1.1–2.2)
ALP SERPL-CCNC: 505 U/L (ref 40–129)
ALT SERPL-CCNC: 5 U/L (ref 10–50)
ANION GAP SERPL CALC-SCNC: 16 MMOL/L (ref 2–14)
AST SERPL-CCNC: 20 U/L (ref 10–50)
BASOPHILS # BLD: 0.08 K/UL (ref 0–0.1)
BASOPHILS NFR BLD: 1.1 % (ref 0–1)
BILIRUB SERPL-MCNC: 0.4 MG/DL (ref 0–1.2)
BUN SERPL-MCNC: 34 MG/DL (ref 6–20)
BUN/CREAT SERPL: 6 (ref 12–20)
CALCIUM SERPL-MCNC: 8.6 MG/DL (ref 8.6–10)
CHLORIDE SERPL-SCNC: 95 MMOL/L (ref 98–107)
CO2 SERPL-SCNC: 26 MMOL/L (ref 20–29)
CREAT SERPL-MCNC: 6.23 MG/DL (ref 0.7–1.2)
DIFFERENTIAL METHOD BLD: ABNORMAL
EOSINOPHIL # BLD: 0.18 K/UL (ref 0–0.4)
EOSINOPHIL NFR BLD: 2.4 % (ref 0–7)
ERYTHROCYTE [DISTWIDTH] IN BLOOD BY AUTOMATED COUNT: 16.4 % (ref 11.5–14.5)
GLOBULIN SER CALC-MCNC: 4.3 G/DL (ref 2–4)
GLUCOSE SERPL-MCNC: 90 MG/DL (ref 65–100)
HCT VFR BLD AUTO: 32.4 % (ref 36.6–50.3)
HGB BLD-MCNC: 10.5 G/DL (ref 12.1–17)
IMM GRANULOCYTES # BLD AUTO: 0.02 K/UL (ref 0–0.04)
IMM GRANULOCYTES NFR BLD AUTO: 0.3 % (ref 0–0.5)
LYMPHOCYTES # BLD: 1.04 K/UL (ref 0.8–3.5)
LYMPHOCYTES NFR BLD: 13.7 % (ref 12–49)
MCH RBC QN AUTO: 29.6 PG (ref 26–34)
MCHC RBC AUTO-ENTMCNC: 32.4 G/DL (ref 30–36.5)
MCV RBC AUTO: 91.3 FL (ref 80–99)
MONOCYTES # BLD: 0.79 K/UL (ref 0–1)
MONOCYTES NFR BLD: 10.4 % (ref 5–13)
NEUTS SEG # BLD: 5.46 K/UL (ref 1.8–8)
NEUTS SEG NFR BLD: 72.1 % (ref 32–75)
NRBC # BLD: 0 K/UL (ref 0–0.01)
NRBC BLD-RTO: 0 PER 100 WBC
PLATELET # BLD AUTO: 145 K/UL (ref 150–400)
PMV BLD AUTO: 11.3 FL (ref 8.9–12.9)
POTASSIUM SERPL-SCNC: 3.5 MMOL/L (ref 3.5–5.1)
PROT SERPL-MCNC: 7.7 G/DL (ref 6.4–8.3)
RBC # BLD AUTO: 3.55 M/UL (ref 4.1–5.7)
SODIUM SERPL-SCNC: 137 MMOL/L (ref 136–145)
WBC # BLD AUTO: 7.6 K/UL (ref 4.1–11.1)

## 2025-08-12 PROCEDURE — 80053 COMPREHEN METABOLIC PANEL: CPT

## 2025-08-12 PROCEDURE — 74176 CT ABD & PELVIS W/O CONTRAST: CPT

## 2025-08-12 PROCEDURE — 85025 COMPLETE CBC W/AUTO DIFF WBC: CPT

## 2025-08-12 PROCEDURE — 99284 EMERGENCY DEPT VISIT MOD MDM: CPT

## 2025-08-12 PROCEDURE — 36415 COLL VENOUS BLD VENIPUNCTURE: CPT

## 2025-08-12 PROCEDURE — 6370000000 HC RX 637 (ALT 250 FOR IP): Performed by: EMERGENCY MEDICINE

## 2025-08-12 RX ORDER — ONDANSETRON 4 MG/1
4 TABLET, ORALLY DISINTEGRATING ORAL 3 TIMES DAILY PRN
Qty: 21 TABLET | Refills: 0 | Status: SHIPPED | OUTPATIENT
Start: 2025-08-12

## 2025-08-12 RX ORDER — TRAMADOL HYDROCHLORIDE 50 MG/1
50 TABLET ORAL ONCE
Status: COMPLETED | OUTPATIENT
Start: 2025-08-12 | End: 2025-08-12

## 2025-08-12 RX ORDER — ONDANSETRON 4 MG/1
4 TABLET, ORALLY DISINTEGRATING ORAL ONCE
Status: COMPLETED | OUTPATIENT
Start: 2025-08-12 | End: 2025-08-12

## 2025-08-12 RX ORDER — DICYCLOMINE HCL 20 MG
20 TABLET ORAL
Status: DISCONTINUED | OUTPATIENT
Start: 2025-08-12 | End: 2025-08-12 | Stop reason: HOSPADM

## 2025-08-12 RX ORDER — PROMETHAZINE HYDROCHLORIDE 25 MG/1
25 SUPPOSITORY RECTAL EVERY 6 HOURS PRN
Qty: 12 SUPPOSITORY | Refills: 0 | Status: SHIPPED | OUTPATIENT
Start: 2025-08-12 | End: 2025-08-19

## 2025-08-12 RX ORDER — ONDANSETRON 2 MG/ML
4 INJECTION INTRAMUSCULAR; INTRAVENOUS
Status: DISCONTINUED | OUTPATIENT
Start: 2025-08-12 | End: 2025-08-12 | Stop reason: HOSPADM

## 2025-08-12 RX ORDER — TRAMADOL HYDROCHLORIDE 50 MG/1
50 TABLET ORAL EVERY 6 HOURS PRN
Qty: 5 TABLET | Refills: 0 | Status: SHIPPED | OUTPATIENT
Start: 2025-08-12 | End: 2025-08-15

## 2025-08-12 RX ADMIN — ONDANSETRON 4 MG: 4 TABLET, ORALLY DISINTEGRATING ORAL at 21:17

## 2025-08-12 RX ADMIN — TRAMADOL HYDROCHLORIDE 50 MG: 50 TABLET, COATED ORAL at 21:18

## 2025-08-12 ASSESSMENT — PAIN DESCRIPTION - ORIENTATION: ORIENTATION: LEFT

## 2025-08-12 ASSESSMENT — ENCOUNTER SYMPTOMS
SHORTNESS OF BREATH: 0
DIARRHEA: 0
NAUSEA: 1
VOMITING: 1
ABDOMINAL PAIN: 1

## 2025-08-12 ASSESSMENT — PAIN SCALES - GENERAL: PAINLEVEL_OUTOF10: 7

## 2025-08-12 ASSESSMENT — PAIN DESCRIPTION - LOCATION: LOCATION: ABDOMEN

## 2025-08-12 ASSESSMENT — PAIN DESCRIPTION - DESCRIPTORS: DESCRIPTORS: ACHING

## (undated) DEVICE — PART NUMBER 108260, VTI 8 MHZ DISPOSABLE DOPPLER PROBE, STRAIGHT, BOX: Brand: VTI 8 MHZ DISPOSABLE DOPPLER PROBE, STRAIGHT, BOX

## (undated) DEVICE — RADIFOCUS GLIDEWIRE: Brand: GLIDEWIRE

## (undated) DEVICE — TOTAL TRAY, 16FR 10ML SIL FOLEY, URN: Brand: MEDLINE

## (undated) DEVICE — SUTURE PROL 5-0 L18IN NONABSORBABLE BLU RB-2 L13MM 1/2 CIR 8713H

## (undated) DEVICE — SYR 3ML LL TIP 1/10ML GRAD --

## (undated) DEVICE — AGENT HEMSTAT W4XL4IN OXIDIZED REGENERATED CELOS ABSRB SFT

## (undated) DEVICE — INFECTION CONTROL KIT SYS

## (undated) DEVICE — AV FISTULA - MRMC: Brand: MEDLINE INDUSTRIES, INC.

## (undated) DEVICE — STAPLER SKIN H3.9MM WIRE DIA0.58MM CRWN 6.9MM 35 STPL ROT

## (undated) DEVICE — BAG VI-DRAPE ISOLATION 18IN X 18IN STRL

## (undated) DEVICE — FOGARTY ARTERIAL EMBOLECTOMY CATHETER 4F 80CM: Brand: FOGARTY

## (undated) DEVICE — STOPCOCK IV 4 W TRNSPAR

## (undated) DEVICE — GLOVE ORTHO 8   MSG9480

## (undated) DEVICE — COVER,TABLE,HEAVY DUTY,77"X90",STRL: Brand: MEDLINE

## (undated) DEVICE — SUTURE VCRL SZ 3-0 L27IN ABSRB UD L26MM SH 1/2 CIR J416H

## (undated) DEVICE — DRAPE,FEM ANGIO,2 WIN,STERILE: Brand: MEDLINE

## (undated) DEVICE — SUTURE PERMAHAND SZ 0 L30IN NONABSORBABLE BLK SILK BRAID A306H

## (undated) DEVICE — SHEAR HARMONIC FOCUS OEM 9CM --

## (undated) DEVICE — MICROPUNCTURE INTRODUCER SET SILHOUETTE TRANSITIONLESS WITH STAINLESS STEEL WIRE GUIDE: Brand: MICROPUNCTURE

## (undated) DEVICE — STAIN INDIA INK IN NACL 10ML --

## (undated) DEVICE — GOWN,SLEEVE,STERILE,W/CSR WRAP,1/P: Brand: MEDLINE

## (undated) DEVICE — Device

## (undated) DEVICE — GOWN,SIRUS,NONRNF,SETINSLV,2XL,18/CS: Brand: MEDLINE

## (undated) DEVICE — Device: Brand: JELCO

## (undated) DEVICE — SI BRITE TIP SHEATH F7 5.5 CM: Brand: BRITE TIP

## (undated) DEVICE — SOLUTION IV 500ML 0.9% SOD CHL FLX CONT

## (undated) DEVICE — INFLATION DEVICE: Brand: ENCORE™ 26

## (undated) DEVICE — (D)PREP SKN CHLRAPRP APPL 26ML -- CONVERT TO ITEM 371833

## (undated) DEVICE — SYRINGE MED 3ML CLR PLAS STD N CTRL LUERLOCK TIP DISP

## (undated) DEVICE — LABEL MED VASC MRMC STRL

## (undated) DEVICE — PROVE COVER: Brand: UNBRANDED

## (undated) DEVICE — SPONGE GZ W4XL4IN COT 12 PLY TYP VII WVN C FLD DSGN

## (undated) DEVICE — SNAP KOVER: Brand: UNBRANDED

## (undated) DEVICE — RING BASIN GUIDEWIRE BOWL: Brand: MEDLINE INDUSTRIES, INC.

## (undated) DEVICE — REM POLYHESIVE ADULT PATIENT RETURN ELECTRODE: Brand: VALLEYLAB

## (undated) DEVICE — BLADE ASSEMB CLP HAIR FINE --

## (undated) DEVICE — DRAPE,LAPAROTOMY,PED,STERILE: Brand: MEDLINE

## (undated) DEVICE — SUT PROL 6-0 24IN BV1 DA BLU --

## (undated) DEVICE — KENDALL SCD EXPRESS SLEEVES, KNEE LENGTH, MEDIUM: Brand: KENDALL SCD

## (undated) DEVICE — SUTURE PERMAHAND SZ 2-0 L30IN NONABSORBABLE BLK SILK W/O A305H

## (undated) DEVICE — YANKAUER,TAPERED BULBOUS TIP,W/O VENT: Brand: MEDLINE

## (undated) DEVICE — FOGARTY ARTERIAL EMBOLECTOMY CATHETER 3F 80CM: Brand: FOGARTY

## (undated) DEVICE — PROBE VASC 8MHZ WTRPRF

## (undated) DEVICE — C-ARM: Brand: UNBRANDED

## (undated) DEVICE — VESSEL LOOPS,MINI, YELLOW: Brand: DEVON

## (undated) DEVICE — NEEDLE HYPO 18GA L1.5IN PNK S STL HUB POLYPR SHLD REG BVL

## (undated) DEVICE — SUTURE NONABSORBABLE MONOFILAMENT 3-0 PS-1 18 IN BLK ETHILON 1663H

## (undated) DEVICE — BAG ISOL TRNSPRT 18X18.5IN LF --

## (undated) DEVICE — PTA BALLOON DILATATION CATHETER: Brand: MUSTANG™

## (undated) DEVICE — SOLIDIFIER FLD 2OZ 1500CC N DISINF IN BTL DISP SAFESORB

## (undated) DEVICE — SUT ETHLN 4-0 18IN PS2 BLK --

## (undated) DEVICE — STERILE POLYISOPRENE POWDER-FREE SURGICAL GLOVES: Brand: PROTEXIS

## (undated) DEVICE — BANDAGE,GAUZE,BULKEE II,4.5"X4.1YD,STRL: Brand: MEDLINE

## (undated) DEVICE — LOOP,VESSEL,MAXI,BLUE,2/PK,STERILE: Brand: MEDLINE

## (undated) DEVICE — SPONGE GZ W4XL4IN COT RADPQ HIGHLY ABSRB

## (undated) DEVICE — SUTURE PERMAHAND SZ 3-0 L30IN NONABSORBABLE BLK SILK BRAID A304H

## (undated) DEVICE — 1200 GUARD II KIT W/5MM TUBE W/O VAC TUBE: Brand: GUARDIAN

## (undated) DEVICE — 450 ML BOTTLE OF 0.05% CHLORHEXIDINE GLUCONATE IN 99.95% STERILE WATER FOR IRRIGATION, USP AND APPLICATOR.: Brand: IRRISEPT ANTIMICROBIAL WOUND LAVAGE

## (undated) DEVICE — TOWEL 4 PLY TISS 19X30 SUE WHT

## (undated) DEVICE — SUT PROL 6-0 18IN BV1 DA BLU --

## (undated) DEVICE — SYR 10ML LUER LOK 1/5ML GRAD --

## (undated) DEVICE — FOGARTY ARTERIAL EMBOLECTOMY CATHETER 2F 60CM: Brand: FOGARTY

## (undated) DEVICE — NEONATAL-ADULT SPO2 SENSOR: Brand: NELLCOR

## (undated) DEVICE — PREP SKN CHLRAPRP APL 26ML STR --

## (undated) DEVICE — BLADE CLIPPER GEN PURP NS

## (undated) DEVICE — PERCUTANEOUS ENTRY THINWALL NEEDLE  ONE-PART: Brand: COOK

## (undated) DEVICE — CATH IV AUTOGRD BC PNK 20GA 25 -- INSYTE

## (undated) DEVICE — TUBING, SUCTION, 1/4" X 10', STRAIGHT: Brand: MEDLINE

## (undated) DEVICE — GLOVE SURG SZ 8 L12IN FNGR THK94MIL STD WHT LTX FREE

## (undated) DEVICE — ELECTRODE,RADIOTRANSLUCENT,FOAM,5PK: Brand: MEDLINE

## (undated) DEVICE — KIT,1200CC CANISTER,3/16"X6' TUBING: Brand: MEDLINE INDUSTRIES, INC.

## (undated) DEVICE — SUTURE PROL SZ 5-0 L24IN NONABSORBABLE BLU RB-2 L13IN 1/2 8554H

## (undated) DEVICE — CATHETER ANGIO BER 038 3 CM 5 FRX65 CM SFT N BRAIDED SOFT-VU

## (undated) DEVICE — DECANTER BAG 9": Brand: MEDLINE INDUSTRIES, INC.

## (undated) DEVICE — ATLAS®  PTA BALLOON DILATATION CATHETER 12 MM X 40 MM, 75 CM CATHETER: Brand: ATLAS®

## (undated) DEVICE — FOGARTY EMBOLECTOMY CATHETER: Brand: FOGARTY

## (undated) DEVICE — SOL INJ SOD CL 0.9% 500ML BG --

## (undated) DEVICE — LABEL MED CARD MRMC STRL

## (undated) DEVICE — SUTURE NONABSORBABLE MONOFILAMENT 4-0 CV-5 PT-13 24 IN GORTX 5K08B

## (undated) DEVICE — COVER,MAYO STAND,STERILE: Brand: MEDLINE

## (undated) DEVICE — SUT CHRMC 3-0 27IN SH BRN --

## (undated) DEVICE — DRESSING,GAUZE,XEROFORM,CURAD,5"X9",ST: Brand: CURAD

## (undated) DEVICE — BANDAGE COBAN 6 IN WND 6INX5YD FOAM

## (undated) DEVICE — SUTURE PERMAHAND SZ 2-0 L18IN NONABSORBABLE BLK L26MM SH C012D

## (undated) DEVICE — DEVON™ KNEE AND BODY STRAP 60" X 3" (1.5 M X 7.6 CM): Brand: DEVON

## (undated) DEVICE — SUTURE VCRL SZ 2-0 L36IN ABSRB UD L36MM CT-1 1/2 CIR J945H

## (undated) DEVICE — Z DISCONTINUED NO SUB IDED TAPE UMB W1/8XL36IN WHT COT STRND NONRADIOPAQUE

## (undated) DEVICE — SPONGE GZ W4XL4IN COT 12 PLY TYP VII WVN C FLD DSGN STERILE

## (undated) DEVICE — TOWEL SURG W17XL27IN STD BLU COT NONFENESTRATED PREWASHED

## (undated) DEVICE — HYPODERMIC SAFETY NEEDLE: Brand: MAGELLAN

## (undated) DEVICE — SUTURE PERMAHAND SZ 2-0 L30IN NONABSORBABLE BLK SH L26MM C016D

## (undated) DEVICE — EXTREMITY-MRMC: Brand: MEDLINE INDUSTRIES, INC.

## (undated) DEVICE — FOGARTY THRU-LUMEN EMBOLECTOMY CATHETER, 4F 40CM: Brand: FOGARTY

## (undated) DEVICE — INTRODUCER SHTH 7FR CANN L5.5CM DIL TIP 35MM ORNG TUNGSTEN

## (undated) DEVICE — SHEAR RMFG HARMONIC FOCUS 9CM -- OEM ITEM L#322125

## (undated) DEVICE — COVER,MAYO STAND,XL,STERILE: Brand: MEDLINE

## (undated) DEVICE — SUTURE MCRYL SZ 4-0 L27IN ABSRB UD L19MM PS-2 1/2 CIR PRIM Y426H

## (undated) DEVICE — STOCKINETTE,IMPERVIOUS,12X48,STERILE: Brand: MEDLINE

## (undated) DEVICE — DRAPE,REIN 53X77,STERILE: Brand: MEDLINE

## (undated) DEVICE — SYRINGE ANGIO CNTRST DEL 20 CC POLYCARB LIGHT GRN MEDALLION

## (undated) DEVICE — LIQUIBAND RAPID ADHESIVE 36/CS 0.8ML: Brand: MEDLINE

## (undated) DEVICE — GARMENT,MEDLINE,DVT,INT,CALF,MED, GEN2: Brand: MEDLINE

## (undated) DEVICE — SUT SLK 2 60IN TIE MP BLK --

## (undated) DEVICE — SUT ETHLN 3-0 18IN PS1 BLK --

## (undated) DEVICE — DISH PETRI W/LID STRL -- MIN CASE ORDER IS 2 CA

## (undated) DEVICE — 2108 SERIES SAGITTAL BLADE (24.8 X 0.88 X 73.8MM)

## (undated) DEVICE — SLIM BODY SKIN STAPLER: Brand: APPOSE ULC

## (undated) DEVICE — FOGARTY - HYDRAGRIP SURGICAL - CLAMP INSERTS: Brand: FOGARTY SOFTJAW

## (undated) DEVICE — SUTURE GORTX SZ 4-0 L24IN NONABSORBABLE L13MM PT-13 3/8 CIR 5K08A

## (undated) DEVICE — DERMABOND SKIN ADH 0.7ML -- DERMABOND ADVANCED 12/BX

## (undated) DEVICE — CATHETER ANGIO 5FR L100CM GWIRE 0.038IN BERN NONBRAIDED HI

## (undated) DEVICE — SUTURE NONABSORBABLE MONOFILAMENT 4-0 PS-2 18 IN BLK ETHILON 1667G

## (undated) DEVICE — INTRALOCK 4-WAY TRANSPARENT STOPCOCK: Brand: ICU MEDICAL

## (undated) DEVICE — SUTURE VCRL SZ 0 L36IN ABSRB UD L36MM CT-1 1/2 CIR J946H

## (undated) DEVICE — OR HYBRID-MRMC: Brand: MEDLINE INDUSTRIES, INC.

## (undated) DEVICE — SOLUTION IRRIG 1000ML 0.9% SOD CHL USP POUR PLAS BTL

## (undated) DEVICE — SOLUTION LACTATED RINGERS INJECTION USP

## (undated) DEVICE — X-RAY SPONGES,16 PLY: Brand: DERMACEA

## (undated) DEVICE — SUTURE PROL SZ 6-0 L24IN NONABSORBABLE BLU L9.3MM BV-1 3/8 8805H

## (undated) DEVICE — SUTURE PROL SZ 5-0 L36IN NONABSORBABLE BLU L17MM RB-1 1/2 8556H

## (undated) DEVICE — HANDLE LT SNAP ON ULT DURABLE LENS FOR TRUMPF ALC DISPOSABLE

## (undated) DEVICE — BASIN EMSIS 16OZ GRAPHITE PLAS KID SHP MOLD GRAD FOR ORAL

## (undated) DEVICE — NEEDLE SCLERO 25GA L240CM OD0.51MM ID0.24MM EXTN L4MM SHTH

## (undated) DEVICE — (D)AGENT INJECTN ELEVIEW 10ML -- DISC BY MFG

## (undated) DEVICE — IMMOBILIZER KNEE 3 PNL 19 IN

## (undated) DEVICE — SET ADMIN 16ML TBNG L100IN 2 Y INJ SITE IV PIGGY BK DISP

## (undated) DEVICE — Z DISCONTINUED PER MEDLINE LINE GAS SAMPLING O2/CO2 LNG AD 13 FT NSL W/ TBNG FILTERLINE

## (undated) DEVICE — SUTURE GORTX SZ 4-0 L24IN NONABSORBABLE L13MM TTC-13 3/8 CIR 5K02A

## (undated) DEVICE — GOWN,PREVENTION PLUS,XLN/2XL,ST,22/CS: Brand: MEDLINE

## (undated) DEVICE — BAG PRESSURE INFUSION 500ML -- CONVERT TO ITEM 360124

## (undated) DEVICE — BAG WST COLLCTN BIOHAZARD 38X38 IN DRWSTRNG RED

## (undated) DEVICE — SUT PROL 7-0 18IN BV1 DA BLU --

## (undated) DEVICE — VASCULAR-MRMC: Brand: MEDLINE INDUSTRIES, INC.

## (undated) DEVICE — NDL HYPO REG BVL RW 22GX1IN --

## (undated) DEVICE — 3M™ TEGADERM™ TRANSPARENT FILM DRESSING FRAME STYLE, 1628, 6 IN X 8 IN (15 CM X 20 CM), 10/CT 8CT/CASE: Brand: 3M™ TEGADERM™

## (undated) DEVICE — SPONGE LAPAROTOMY W18XL18IN WHITE STRUNG RADIOPAQUE STERILE

## (undated) DEVICE — CLOTTRIEVER SHEATH, 13 FR, 15 CM: Brand: CLOTTRIEVER SHEATH, 13 FR

## (undated) DEVICE — GAUZE SPONGES,12 PLY: Brand: CURITY

## (undated) DEVICE — GUIDEWIRE VASC L180CM DIA0.035IN TIP L5CM PTFE COAT S STL

## (undated) DEVICE — SWAB CULT LIQ STUART AGR AERB MOD IN BRK SGL RAYON TIP PLAS 220099] BECTON DICKINSON MICRO]

## (undated) DEVICE — 3M(TM) MEDIPORE(TM) H SOFT CLOTH TAPE 2866: Brand: 3M™ MEDIPORE™

## (undated) DEVICE — INTRODUCER SHTH 7FR CANN L11CM DIL TIP 35MM ORNG TUNGSTEN

## (undated) DEVICE — BLOCK BITE ENDOSCP AD 21 MM W/ DIL BLU LF DISP

## (undated) DEVICE — SOLUTION IV 500ML 0.9% SOD CHL PH 5 INJ USP VIAFLX PLAS

## (undated) DEVICE — LOOP VES W13MM THK09MM MINI RED SIL FLD REPELLENT

## (undated) DEVICE — PAD,ABDOMINAL,8"X10",ST,LF: Brand: MEDLINE

## (undated) DEVICE — STRAP,POSITIONING,KNEE/BODY,FOAM,4X60": Brand: MEDLINE

## (undated) DEVICE — 3M™ MEDIPORE™ H SOFT CLOTH SURGICAL TAPE 2864, 4 INCH X 10 YARD (10CM X 9,14M), 12 ROLLS/CASE: Brand: 3M™ MEDIPORE™

## (undated) DEVICE — CLOTTRIEVER, 16MM: Brand: CLOTTRIEVER CATHETER

## (undated) DEVICE — INTENDED FOR TISSUE SEPARATION, AND OTHER PROCEDURES THAT REQUIRE A SHARP SURGICAL BLADE TO PUNCTURE OR CUT.: Brand: BARD-PARKER ® CARBON RIB-BACK BLADES

## (undated) DEVICE — SUTURE VCRL SZ 2-0 L36IN ABSRB UD L40MM CT 1/2 CIR J957H

## (undated) DEVICE — FEMALE LUER ADAPTER: Brand: ARGYLE

## (undated) DEVICE — PROPATEN VASCULAR GRAFT TW 7MMX40CM HEPARIN
Type: IMPLANTABLE DEVICE | Site: ARM | Status: NON-FUNCTIONAL
Brand: GORE PROPATEN VASCULAR GRAFT
Removed: 2020-09-08

## (undated) DEVICE — SYRINGE TB 1ML TRNSLUC BRL WHT PLUNG BLK MRK CONVENTIONAL

## (undated) DEVICE — SUT SLK 2-0SH 30IN BLK --

## (undated) DEVICE — INTENDED USED TO PROTECT, TAG AND HELP LOCATED SUTURES DURING SURGERY: Brand: STERION®SUTURE AID BOOTIES

## (undated) DEVICE — SYRINGE 50ML E/T

## (undated) DEVICE — ZIMMER® STERILE DISPOSABLE TOURNIQUET CUFF WITH PROTECTIVE SLEEVE AND PLC, DUAL PORT, SINGLE BLADDER, 34 IN. (86 CM)

## (undated) DEVICE — CATHETER ANGIO 5FR L65CM 0.038IN BERN SEL SFT RADPQ TIP HI

## (undated) DEVICE — APPLICATOR MEDICATED 26 CC SOLUTION HI LT ORNG CHLORAPREP

## (undated) DEVICE — CULTURETTE SGL EVAC TUBE PALL -- 100/CA

## (undated) DEVICE — SUT PROL 6-0 30IN C1 DA BLU --

## (undated) DEVICE — CLAMP,EDSLAB HANDLELESS 8MM DBLE SOFTJAW: Brand: FOGARTY SOFTJAW

## (undated) DEVICE — LOOP VES MAXI YEL 2/PK

## (undated) DEVICE — FOGARTY THRU-LUMEN EMBOLECTOMY CATHETER 5.5F 40CM: Brand: FOGARTY